# Patient Record
Sex: MALE | Race: WHITE | Employment: FULL TIME | URBAN - METROPOLITAN AREA
[De-identification: names, ages, dates, MRNs, and addresses within clinical notes are randomized per-mention and may not be internally consistent; named-entity substitution may affect disease eponyms.]

---

## 2017-01-03 PROBLEM — I48.91 RAPID ATRIAL FIBRILLATION (HCC): Status: ACTIVE | Noted: 2017-01-03

## 2017-01-03 PROBLEM — Z45.02 AICD DISCHARGE: Status: ACTIVE | Noted: 2017-01-03

## 2017-01-03 PROBLEM — F10.939 ALCOHOL WITHDRAWAL (HCC): Status: ACTIVE | Noted: 2017-01-03

## 2017-01-03 PROBLEM — F14.10 COCAINE ABUSE (HCC): Status: ACTIVE | Noted: 2017-01-03

## 2017-01-04 ENCOUNTER — TELEPHONE (OUTPATIENT)
Dept: FAMILY MEDICINE CLINIC | Age: 52
End: 2017-01-04

## 2017-01-04 PROBLEM — I48.91 RAPID ATRIAL FIBRILLATION (HCC): Status: RESOLVED | Noted: 2017-01-03 | Resolved: 2017-01-04

## 2017-01-05 ENCOUNTER — TELEPHONE (OUTPATIENT)
Dept: FAMILY MEDICINE CLINIC | Age: 52
End: 2017-01-05

## 2017-01-05 ENCOUNTER — PROCEDURE VISIT (OUTPATIENT)
Dept: CARDIOLOGY | Age: 52
End: 2017-01-05

## 2017-01-05 DIAGNOSIS — F41.3 OTHER MIXED ANXIETY DISORDERS: ICD-10-CM

## 2017-01-05 DIAGNOSIS — I50.22 CHRONIC SYSTOLIC CONGESTIVE HEART FAILURE (HCC): Primary | ICD-10-CM

## 2017-01-05 DIAGNOSIS — Z95.810 S/P ICD (INTERNAL CARDIAC DEFIBRILLATOR) PROCEDURE: ICD-10-CM

## 2017-01-05 DIAGNOSIS — E11.9 CONTROLLED TYPE 2 DIABETES MELLITUS WITHOUT COMPLICATION, WITHOUT LONG-TERM CURRENT USE OF INSULIN (HCC): Primary | ICD-10-CM

## 2017-01-05 DIAGNOSIS — E11.9 CONTROLLED TYPE 2 DIABETES MELLITUS WITHOUT COMPLICATION, WITHOUT LONG-TERM CURRENT USE OF INSULIN (HCC): ICD-10-CM

## 2017-01-05 PROCEDURE — 93297 REM INTERROG DEV EVAL ICPMS: CPT | Performed by: NUCLEAR MEDICINE

## 2017-01-05 RX ORDER — ALPRAZOLAM 0.5 MG/1
0.5 TABLET ORAL NIGHTLY PRN
Qty: 30 TABLET | Refills: 0 | Status: SHIPPED | OUTPATIENT
Start: 2017-01-05 | End: 2017-03-31 | Stop reason: SDUPTHER

## 2017-01-06 RX ORDER — VALSARTAN 320 MG/1
TABLET ORAL
Qty: 90 TABLET | Refills: 0 | Status: SHIPPED | OUTPATIENT
Start: 2017-01-06 | End: 2017-04-04 | Stop reason: SDUPTHER

## 2017-01-11 ENCOUNTER — OFFICE VISIT (OUTPATIENT)
Dept: FAMILY MEDICINE CLINIC | Age: 52
End: 2017-01-11

## 2017-01-11 DIAGNOSIS — E11.65 CONTROLLED TYPE 2 DIABETES MELLITUS WITH HYPERGLYCEMIA, WITHOUT LONG-TERM CURRENT USE OF INSULIN (HCC): ICD-10-CM

## 2017-01-11 DIAGNOSIS — Z45.02 AICD DISCHARGE: ICD-10-CM

## 2017-01-11 DIAGNOSIS — I10 ESSENTIAL HYPERTENSION: Chronic | ICD-10-CM

## 2017-01-11 DIAGNOSIS — I42.0 CARDIOMYOPATHY, DILATED (HCC): ICD-10-CM

## 2017-01-11 DIAGNOSIS — F10.10 ALCOHOL ABUSE: ICD-10-CM

## 2017-01-11 DIAGNOSIS — Z79.01 ANTICOAGULATED ON COUMADIN: ICD-10-CM

## 2017-01-11 DIAGNOSIS — F33.2 SEVERE EPISODE OF RECURRENT MAJOR DEPRESSIVE DISORDER, WITHOUT PSYCHOTIC FEATURES (HCC): Chronic | ICD-10-CM

## 2017-01-11 DIAGNOSIS — Z72.0 TOBACCO ABUSE: ICD-10-CM

## 2017-01-11 DIAGNOSIS — I48.0 PAROXYSMAL A-FIB (HCC): Primary | ICD-10-CM

## 2017-01-11 DIAGNOSIS — F19.11 HISTORY OF SUBSTANCE ABUSE (HCC): ICD-10-CM

## 2017-01-11 DIAGNOSIS — E78.2 MIXED HYPERLIPIDEMIA: ICD-10-CM

## 2017-01-11 DIAGNOSIS — I25.10 CORONARY ARTERY DISEASE INVOLVING NATIVE CORONARY ARTERY OF NATIVE HEART WITHOUT ANGINA PECTORIS: ICD-10-CM

## 2017-01-11 PROCEDURE — 99214 OFFICE O/P EST MOD 30 MIN: CPT | Performed by: NURSE PRACTITIONER

## 2017-01-12 ENCOUNTER — ANTI-COAG VISIT (OUTPATIENT)
Dept: OTHER | Age: 52
End: 2017-01-12

## 2017-01-12 VITALS
WEIGHT: 274 LBS | SYSTOLIC BLOOD PRESSURE: 138 MMHG | BODY MASS INDEX: 35.16 KG/M2 | RESPIRATION RATE: 12 BRPM | HEIGHT: 74 IN | DIASTOLIC BLOOD PRESSURE: 80 MMHG | HEART RATE: 56 BPM

## 2017-01-12 VITALS
WEIGHT: 272 LBS | HEART RATE: 60 BPM | SYSTOLIC BLOOD PRESSURE: 130 MMHG | DIASTOLIC BLOOD PRESSURE: 70 MMHG | BODY MASS INDEX: 34.92 KG/M2

## 2017-01-12 DIAGNOSIS — I48.0 PAROXYSMAL ATRIAL FIBRILLATION (HCC): ICD-10-CM

## 2017-01-12 LAB — POC INR: 2.2 (ref 0.8–1.2)

## 2017-01-12 PROCEDURE — 99999 PR OFFICE/OUTPT VISIT,PROCEDURE ONLY: CPT | Performed by: NURSE PRACTITIONER

## 2017-01-12 PROCEDURE — 85610 PROTHROMBIN TIME: CPT | Performed by: NURSE PRACTITIONER

## 2017-01-12 ASSESSMENT — ENCOUNTER SYMPTOMS
COUGH: 0
DIARRHEA: 0
CONSTIPATION: 0
NAUSEA: 0
ABDOMINAL PAIN: 0
SHORTNESS OF BREATH: 0
BLOOD IN STOOL: 0
SHORTNESS OF BREATH: 0

## 2017-01-26 ENCOUNTER — ANTI-COAG VISIT (OUTPATIENT)
Dept: OTHER | Age: 52
End: 2017-01-26

## 2017-01-26 VITALS
HEART RATE: 55 BPM | WEIGHT: 271.2 LBS | BODY MASS INDEX: 34.82 KG/M2 | DIASTOLIC BLOOD PRESSURE: 80 MMHG | SYSTOLIC BLOOD PRESSURE: 138 MMHG

## 2017-01-26 DIAGNOSIS — I48.0 PAROXYSMAL ATRIAL FIBRILLATION (HCC): ICD-10-CM

## 2017-01-26 LAB — POC INR: 2.9 (ref 0.8–1.2)

## 2017-01-26 PROCEDURE — 85610 PROTHROMBIN TIME: CPT | Performed by: NURSE PRACTITIONER

## 2017-01-26 PROCEDURE — 99999 PR OFFICE/OUTPT VISIT,PROCEDURE ONLY: CPT | Performed by: NURSE PRACTITIONER

## 2017-01-26 ASSESSMENT — ENCOUNTER SYMPTOMS
DIARRHEA: 0
CONSTIPATION: 0
SHORTNESS OF BREATH: 0
BLOOD IN STOOL: 0

## 2017-01-30 ENCOUNTER — OFFICE VISIT (OUTPATIENT)
Dept: CARDIOLOGY | Age: 52
End: 2017-01-30

## 2017-01-30 ENCOUNTER — TELEPHONE (OUTPATIENT)
Dept: CARDIOLOGY | Age: 52
End: 2017-01-30

## 2017-01-30 VITALS
DIASTOLIC BLOOD PRESSURE: 74 MMHG | WEIGHT: 270.2 LBS | SYSTOLIC BLOOD PRESSURE: 114 MMHG | BODY MASS INDEX: 34.68 KG/M2 | HEART RATE: 64 BPM | HEIGHT: 74 IN

## 2017-01-30 DIAGNOSIS — Z95.810 ICD (IMPLANTABLE CARDIOVERTER-DEFIBRILLATOR) IN PLACE: ICD-10-CM

## 2017-01-30 DIAGNOSIS — I42.9 CARDIOMYOPATHY (HCC): ICD-10-CM

## 2017-01-30 DIAGNOSIS — I10 ESSENTIAL HYPERTENSION: Primary | ICD-10-CM

## 2017-01-30 DIAGNOSIS — I25.810 CORONARY ARTERY DISEASE INVOLVING CORONARY BYPASS GRAFT OF NATIVE HEART WITHOUT ANGINA PECTORIS: ICD-10-CM

## 2017-01-30 PROCEDURE — 99213 OFFICE O/P EST LOW 20 MIN: CPT | Performed by: NUCLEAR MEDICINE

## 2017-01-30 RX ORDER — SILDENAFIL 50 MG/1
50 TABLET, FILM COATED ORAL PRN
Qty: 10 TABLET | Refills: 0 | Status: SHIPPED | OUTPATIENT
Start: 2017-01-30 | End: 2017-04-11 | Stop reason: ALTCHOICE

## 2017-02-09 ENCOUNTER — PROCEDURE VISIT (OUTPATIENT)
Dept: CARDIOLOGY | Age: 52
End: 2017-02-09

## 2017-02-09 DIAGNOSIS — Z95.810 S/P ICD (INTERNAL CARDIAC DEFIBRILLATOR) PROCEDURE: Primary | ICD-10-CM

## 2017-02-09 PROCEDURE — 93296 REM INTERROG EVL PM/IDS: CPT | Performed by: INTERNAL MEDICINE

## 2017-02-09 PROCEDURE — 93295 DEV INTERROG REMOTE 1/2/MLT: CPT | Performed by: INTERNAL MEDICINE

## 2017-02-16 ENCOUNTER — ANTI-COAG VISIT (OUTPATIENT)
Dept: OTHER | Age: 52
End: 2017-02-16

## 2017-02-16 VITALS
HEART RATE: 63 BPM | WEIGHT: 275.8 LBS | SYSTOLIC BLOOD PRESSURE: 142 MMHG | DIASTOLIC BLOOD PRESSURE: 60 MMHG | BODY MASS INDEX: 35.41 KG/M2

## 2017-02-16 DIAGNOSIS — I48.0 PAROXYSMAL ATRIAL FIBRILLATION (HCC): ICD-10-CM

## 2017-02-16 LAB — POC INR: 2.1 (ref 0.8–1.2)

## 2017-02-16 PROCEDURE — 85610 PROTHROMBIN TIME: CPT | Performed by: NURSE PRACTITIONER

## 2017-02-16 PROCEDURE — 99999 PR OFFICE/OUTPT VISIT,PROCEDURE ONLY: CPT | Performed by: NURSE PRACTITIONER

## 2017-02-16 ASSESSMENT — ENCOUNTER SYMPTOMS
DIARRHEA: 0
SHORTNESS OF BREATH: 0
CONSTIPATION: 0
BLOOD IN STOOL: 0

## 2017-03-06 RX ORDER — SIMVASTATIN 20 MG
TABLET ORAL
Qty: 90 TABLET | Refills: 2 | Status: SHIPPED | OUTPATIENT
Start: 2017-03-06 | End: 2017-04-26

## 2017-03-14 DIAGNOSIS — I48.0 PAROXYSMAL ATRIAL FIBRILLATION (HCC): Primary | ICD-10-CM

## 2017-03-16 ENCOUNTER — PROCEDURE VISIT (OUTPATIENT)
Dept: CARDIOLOGY | Age: 52
End: 2017-03-16

## 2017-03-16 ENCOUNTER — TELEPHONE (OUTPATIENT)
Dept: CARDIOLOGY | Age: 52
End: 2017-03-16

## 2017-03-16 DIAGNOSIS — Z95.810 S/P ICD (INTERNAL CARDIAC DEFIBRILLATOR) PROCEDURE: ICD-10-CM

## 2017-03-16 DIAGNOSIS — I50.22 CHRONIC SYSTOLIC CONGESTIVE HEART FAILURE (HCC): Primary | ICD-10-CM

## 2017-03-16 PROCEDURE — 93297 REM INTERROG DEV EVAL ICPMS: CPT | Performed by: NUCLEAR MEDICINE

## 2017-03-29 ENCOUNTER — ANTI-COAG VISIT (OUTPATIENT)
Dept: OTHER | Age: 52
End: 2017-03-29

## 2017-03-29 VITALS
HEART RATE: 56 BPM | SYSTOLIC BLOOD PRESSURE: 142 MMHG | WEIGHT: 276.6 LBS | BODY MASS INDEX: 35.51 KG/M2 | DIASTOLIC BLOOD PRESSURE: 80 MMHG

## 2017-03-29 DIAGNOSIS — I48.0 PAROXYSMAL ATRIAL FIBRILLATION (HCC): ICD-10-CM

## 2017-03-29 LAB — POC INR: 2.9 (ref 0.8–1.2)

## 2017-03-29 RX ORDER — FINASTERIDE 5 MG/1
TABLET, FILM COATED ORAL
Refills: 3 | COMMUNITY
Start: 2017-02-14 | End: 2018-01-04 | Stop reason: SDUPTHER

## 2017-03-29 ASSESSMENT — ENCOUNTER SYMPTOMS
DIARRHEA: 0
SHORTNESS OF BREATH: 0
CONSTIPATION: 0
BLOOD IN STOOL: 0

## 2017-03-30 RX ORDER — METOPROLOL TARTRATE 50 MG/1
TABLET, FILM COATED ORAL
Qty: 270 TABLET | Refills: 0 | OUTPATIENT
Start: 2017-03-30

## 2017-03-30 RX ORDER — ASPIRIN 325 MG
TABLET, DELAYED RELEASE (ENTERIC COATED) ORAL
Qty: 90 TABLET | Refills: 0 | OUTPATIENT
Start: 2017-03-30

## 2017-03-30 RX ORDER — FUROSEMIDE 40 MG/1
TABLET ORAL
Qty: 90 TABLET | Refills: 0 | OUTPATIENT
Start: 2017-03-30

## 2017-03-31 DIAGNOSIS — F41.3 OTHER MIXED ANXIETY DISORDERS: ICD-10-CM

## 2017-03-31 RX ORDER — FUROSEMIDE 40 MG/1
40 TABLET ORAL DAILY
Qty: 90 TABLET | Refills: 1 | Status: ON HOLD | OUTPATIENT
Start: 2017-03-31 | End: 2017-06-30

## 2017-03-31 RX ORDER — ALPRAZOLAM 0.5 MG/1
0.5 TABLET ORAL NIGHTLY PRN
Qty: 30 TABLET | Refills: 0 | Status: SHIPPED | OUTPATIENT
Start: 2017-03-31 | End: 2017-04-11 | Stop reason: SDUPTHER

## 2017-03-31 RX ORDER — METOPROLOL TARTRATE 50 MG/1
50 TABLET, FILM COATED ORAL 3 TIMES DAILY
Qty: 270 TABLET | Refills: 1 | Status: SHIPPED | OUTPATIENT
Start: 2017-03-31 | End: 2017-09-27 | Stop reason: SDUPTHER

## 2017-04-04 ENCOUNTER — TELEPHONE (OUTPATIENT)
Dept: FAMILY MEDICINE CLINIC | Age: 52
End: 2017-04-04

## 2017-04-04 RX ORDER — VALSARTAN 320 MG/1
TABLET ORAL
Qty: 90 TABLET | Refills: 0 | Status: ON HOLD | OUTPATIENT
Start: 2017-04-04 | End: 2017-07-03 | Stop reason: SDUPTHER

## 2017-04-11 ENCOUNTER — OFFICE VISIT (OUTPATIENT)
Dept: FAMILY MEDICINE CLINIC | Age: 52
End: 2017-04-11

## 2017-04-11 VITALS
DIASTOLIC BLOOD PRESSURE: 76 MMHG | WEIGHT: 272.7 LBS | HEART RATE: 68 BPM | RESPIRATION RATE: 16 BRPM | HEIGHT: 74 IN | BODY MASS INDEX: 35 KG/M2 | SYSTOLIC BLOOD PRESSURE: 124 MMHG

## 2017-04-11 DIAGNOSIS — I50.22 CHRONIC SYSTOLIC CONGESTIVE HEART FAILURE (HCC): ICD-10-CM

## 2017-04-11 DIAGNOSIS — Z95.810 S/P ICD (INTERNAL CARDIAC DEFIBRILLATOR) PROCEDURE: ICD-10-CM

## 2017-04-11 DIAGNOSIS — I48.0 PAROXYSMAL ATRIAL FIBRILLATION (HCC): ICD-10-CM

## 2017-04-11 DIAGNOSIS — Z79.01 ANTICOAGULATED ON COUMADIN: ICD-10-CM

## 2017-04-11 DIAGNOSIS — F10.10 ALCOHOL ABUSE: ICD-10-CM

## 2017-04-11 DIAGNOSIS — E11.65 CONTROLLED TYPE 2 DIABETES MELLITUS WITH HYPERGLYCEMIA, WITHOUT LONG-TERM CURRENT USE OF INSULIN (HCC): Primary | ICD-10-CM

## 2017-04-11 DIAGNOSIS — I25.810 CORONARY ARTERY DISEASE INVOLVING CORONARY BYPASS GRAFT OF NATIVE HEART WITHOUT ANGINA PECTORIS: ICD-10-CM

## 2017-04-11 DIAGNOSIS — Z72.0 TOBACCO ABUSE: ICD-10-CM

## 2017-04-11 DIAGNOSIS — I10 ESSENTIAL HYPERTENSION: Chronic | ICD-10-CM

## 2017-04-11 DIAGNOSIS — F41.3 OTHER MIXED ANXIETY DISORDERS: ICD-10-CM

## 2017-04-11 DIAGNOSIS — Z12.12 SCREENING FOR COLORECTAL CANCER: ICD-10-CM

## 2017-04-11 DIAGNOSIS — Z12.11 SCREENING FOR COLORECTAL CANCER: ICD-10-CM

## 2017-04-11 PROCEDURE — 99214 OFFICE O/P EST MOD 30 MIN: CPT | Performed by: NURSE PRACTITIONER

## 2017-04-11 RX ORDER — ALPRAZOLAM 0.5 MG/1
0.5 TABLET ORAL NIGHTLY PRN
Qty: 30 TABLET | Refills: 5 | Status: SHIPPED | OUTPATIENT
Start: 2017-04-11 | End: 2017-11-05 | Stop reason: SDUPTHER

## 2017-04-12 ENCOUNTER — TELEPHONE (OUTPATIENT)
Dept: FAMILY MEDICINE CLINIC | Age: 52
End: 2017-04-12

## 2017-04-13 ASSESSMENT — ENCOUNTER SYMPTOMS
COUGH: 0
NAUSEA: 0
SHORTNESS OF BREATH: 0
ABDOMINAL PAIN: 0

## 2017-04-20 ENCOUNTER — TELEPHONE (OUTPATIENT)
Dept: CARDIOLOGY | Age: 52
End: 2017-04-20

## 2017-04-20 ENCOUNTER — PROCEDURE VISIT (OUTPATIENT)
Dept: CARDIOLOGY | Age: 52
End: 2017-04-20

## 2017-04-20 DIAGNOSIS — I50.22 CHRONIC SYSTOLIC CONGESTIVE HEART FAILURE (HCC): Primary | ICD-10-CM

## 2017-04-20 DIAGNOSIS — Z95.810 S/P ICD (INTERNAL CARDIAC DEFIBRILLATOR) PROCEDURE: ICD-10-CM

## 2017-04-20 PROCEDURE — 93297 REM INTERROG DEV EVAL ICPMS: CPT | Performed by: NUCLEAR MEDICINE

## 2017-04-26 ENCOUNTER — ANTI-COAG VISIT (OUTPATIENT)
Dept: OTHER | Age: 52
End: 2017-04-26

## 2017-04-26 VITALS
WEIGHT: 271.6 LBS | DIASTOLIC BLOOD PRESSURE: 84 MMHG | SYSTOLIC BLOOD PRESSURE: 138 MMHG | BODY MASS INDEX: 34.87 KG/M2 | HEART RATE: 50 BPM

## 2017-04-26 DIAGNOSIS — I48.0 PAROXYSMAL ATRIAL FIBRILLATION (HCC): ICD-10-CM

## 2017-04-26 LAB — POC INR: 2.4 (ref 0.8–1.2)

## 2017-04-26 ASSESSMENT — ENCOUNTER SYMPTOMS
SHORTNESS OF BREATH: 0
BLOOD IN STOOL: 0
DIARRHEA: 0
CONSTIPATION: 0

## 2017-05-25 ENCOUNTER — TELEPHONE (OUTPATIENT)
Dept: FAMILY MEDICINE CLINIC | Age: 52
End: 2017-05-25

## 2017-05-31 ENCOUNTER — ANTI-COAG VISIT (OUTPATIENT)
Dept: OTHER | Age: 52
End: 2017-05-31

## 2017-05-31 VITALS
DIASTOLIC BLOOD PRESSURE: 84 MMHG | BODY MASS INDEX: 35.13 KG/M2 | HEART RATE: 76 BPM | WEIGHT: 273.6 LBS | SYSTOLIC BLOOD PRESSURE: 160 MMHG

## 2017-05-31 DIAGNOSIS — I48.0 PAROXYSMAL ATRIAL FIBRILLATION (HCC): ICD-10-CM

## 2017-05-31 LAB — POC INR: 3.4 (ref 0.8–1.2)

## 2017-05-31 ASSESSMENT — ENCOUNTER SYMPTOMS
SHORTNESS OF BREATH: 0
DIARRHEA: 0
CONSTIPATION: 0
BLOOD IN STOOL: 0

## 2017-06-01 ENCOUNTER — PROCEDURE VISIT (OUTPATIENT)
Dept: CARDIOLOGY | Age: 52
End: 2017-06-01

## 2017-06-01 DIAGNOSIS — Z95.810 S/P ICD (INTERNAL CARDIAC DEFIBRILLATOR) PROCEDURE: Primary | ICD-10-CM

## 2017-06-01 PROCEDURE — 93295 DEV INTERROG REMOTE 1/2/MLT: CPT | Performed by: NUCLEAR MEDICINE

## 2017-06-01 PROCEDURE — 93296 REM INTERROG EVL PM/IDS: CPT | Performed by: NUCLEAR MEDICINE

## 2017-06-29 ENCOUNTER — PROCEDURE VISIT (OUTPATIENT)
Dept: CARDIOLOGY | Age: 52
End: 2017-06-29

## 2017-06-29 DIAGNOSIS — I50.22 CHRONIC SYSTOLIC CONGESTIVE HEART FAILURE (HCC): Primary | ICD-10-CM

## 2017-06-29 PROCEDURE — 93297 REM INTERROG DEV EVAL ICPMS: CPT | Performed by: NUCLEAR MEDICINE

## 2017-06-30 PROBLEM — T24.202A SECOND DEGREE BURN OF LEFT LEG: Status: ACTIVE | Noted: 2017-06-30

## 2017-06-30 PROBLEM — T24.009A LEG BURN: Status: ACTIVE | Noted: 2017-06-30

## 2017-06-30 PROBLEM — H93.A9 TINNITUS OF VASCULAR ORIGIN: Status: ACTIVE | Noted: 2017-06-30

## 2017-06-30 RX ORDER — POTASSIUM CHLORIDE 20 MEQ/1
TABLET, EXTENDED RELEASE ORAL
Qty: 180 TABLET | Refills: 1 | Status: SHIPPED | OUTPATIENT
Start: 2017-06-30 | End: 2018-01-04 | Stop reason: SDUPTHER

## 2017-06-30 RX ORDER — MEXILETINE HYDROCHLORIDE 150 MG/1
CAPSULE ORAL
Qty: 540 CAPSULE | Refills: 0 | Status: SHIPPED | OUTPATIENT
Start: 2017-06-30 | End: 2017-07-03 | Stop reason: HOSPADM

## 2017-07-03 ENCOUNTER — TELEPHONE (OUTPATIENT)
Dept: CARDIOLOGY | Age: 52
End: 2017-07-03

## 2017-07-03 ENCOUNTER — TELEPHONE (OUTPATIENT)
Dept: FAMILY MEDICINE CLINIC | Age: 52
End: 2017-07-03

## 2017-07-03 RX ORDER — VALSARTAN 320 MG/1
TABLET ORAL
Qty: 90 TABLET | Refills: 0 | Status: SHIPPED | OUTPATIENT
Start: 2017-07-03 | End: 2017-08-23 | Stop reason: ALTCHOICE

## 2017-07-05 ENCOUNTER — TELEPHONE (OUTPATIENT)
Dept: FAMILY MEDICINE CLINIC | Age: 52
End: 2017-07-05

## 2017-07-06 ENCOUNTER — ANTI-COAG VISIT (OUTPATIENT)
Dept: OTHER | Age: 52
End: 2017-07-06

## 2017-07-06 VITALS
HEART RATE: 56 BPM | BODY MASS INDEX: 35.91 KG/M2 | DIASTOLIC BLOOD PRESSURE: 88 MMHG | SYSTOLIC BLOOD PRESSURE: 160 MMHG | WEIGHT: 264.8 LBS

## 2017-07-06 DIAGNOSIS — I48.0 PAROXYSMAL ATRIAL FIBRILLATION (HCC): ICD-10-CM

## 2017-07-06 LAB — POC INR: 2.5 (ref 0.8–1.2)

## 2017-07-06 ASSESSMENT — ENCOUNTER SYMPTOMS
DIARRHEA: 0
SHORTNESS OF BREATH: 0
BLOOD IN STOOL: 0
CONSTIPATION: 0

## 2017-07-07 ENCOUNTER — OFFICE VISIT (OUTPATIENT)
Dept: FAMILY MEDICINE CLINIC | Age: 52
End: 2017-07-07

## 2017-07-07 VITALS
SYSTOLIC BLOOD PRESSURE: 132 MMHG | HEART RATE: 76 BPM | RESPIRATION RATE: 16 BRPM | HEIGHT: 74 IN | WEIGHT: 268.7 LBS | BODY MASS INDEX: 34.48 KG/M2 | DIASTOLIC BLOOD PRESSURE: 78 MMHG

## 2017-07-07 DIAGNOSIS — Z95.810 S/P ICD (INTERNAL CARDIAC DEFIBRILLATOR) PROCEDURE: ICD-10-CM

## 2017-07-07 DIAGNOSIS — I47.29 NSVT (NONSUSTAINED VENTRICULAR TACHYCARDIA): Primary | ICD-10-CM

## 2017-07-07 DIAGNOSIS — E11.65 CONTROLLED TYPE 2 DIABETES MELLITUS WITH HYPERGLYCEMIA, WITHOUT LONG-TERM CURRENT USE OF INSULIN (HCC): ICD-10-CM

## 2017-07-07 DIAGNOSIS — I25.5 ISCHEMIC CARDIOMYOPATHY: ICD-10-CM

## 2017-07-07 DIAGNOSIS — Z79.01 ANTICOAGULATED ON COUMADIN: ICD-10-CM

## 2017-07-07 DIAGNOSIS — I25.810 CORONARY ARTERY DISEASE INVOLVING CORONARY BYPASS GRAFT OF NATIVE HEART WITHOUT ANGINA PECTORIS: ICD-10-CM

## 2017-07-07 DIAGNOSIS — T24.001A: ICD-10-CM

## 2017-07-07 DIAGNOSIS — I50.20 SYSTOLIC CONGESTIVE HEART FAILURE, NYHA CLASS 2 (HCC): ICD-10-CM

## 2017-07-07 PROCEDURE — 99214 OFFICE O/P EST MOD 30 MIN: CPT | Performed by: NURSE PRACTITIONER

## 2017-07-07 ASSESSMENT — PATIENT HEALTH QUESTIONNAIRE - PHQ9
2. FEELING DOWN, DEPRESSED OR HOPELESS: 0
1. LITTLE INTEREST OR PLEASURE IN DOING THINGS: 0
SUM OF ALL RESPONSES TO PHQ QUESTIONS 1-9: 0
SUM OF ALL RESPONSES TO PHQ9 QUESTIONS 1 & 2: 0

## 2017-07-10 ASSESSMENT — ENCOUNTER SYMPTOMS
NAUSEA: 0
ABDOMINAL PAIN: 0
COUGH: 0
SHORTNESS OF BREATH: 0

## 2017-07-13 ENCOUNTER — ANTI-COAG VISIT (OUTPATIENT)
Dept: OTHER | Age: 52
End: 2017-07-13

## 2017-07-13 VITALS
WEIGHT: 266.8 LBS | HEART RATE: 60 BPM | DIASTOLIC BLOOD PRESSURE: 92 MMHG | SYSTOLIC BLOOD PRESSURE: 170 MMHG | BODY MASS INDEX: 34.26 KG/M2

## 2017-07-13 DIAGNOSIS — I48.0 PAROXYSMAL ATRIAL FIBRILLATION (HCC): ICD-10-CM

## 2017-07-13 LAB — POC INR: 5 (ref 0.8–1.2)

## 2017-07-13 ASSESSMENT — ENCOUNTER SYMPTOMS
BLOOD IN STOOL: 0
SHORTNESS OF BREATH: 0
DIARRHEA: 0
CONSTIPATION: 0

## 2017-07-31 ENCOUNTER — HOSPITAL ENCOUNTER (OUTPATIENT)
Dept: PHARMACY | Age: 52
Setting detail: THERAPIES SERIES
Discharge: HOME | End: 2017-07-31
Payer: COMMERCIAL

## 2017-07-31 VITALS
DIASTOLIC BLOOD PRESSURE: 96 MMHG | HEART RATE: 56 BPM | WEIGHT: 267.2 LBS | BODY MASS INDEX: 34.31 KG/M2 | SYSTOLIC BLOOD PRESSURE: 164 MMHG

## 2017-07-31 DIAGNOSIS — I48.0 PAROXYSMAL ATRIAL FIBRILLATION (HCC): ICD-10-CM

## 2017-07-31 LAB
HCT VFR BLD CALC: 43.4 % (ref 42–52)
HEMOGLOBIN: 14.8 GM/DL (ref 14–18)
INR BLD: 4.22 (ref 0.85–1.13)
INTERNATIONAL NORMALIZATION RATIO, POC: 5.1

## 2017-07-31 PROCEDURE — 36416 COLLJ CAPILLARY BLOOD SPEC: CPT

## 2017-07-31 PROCEDURE — 85610 PROTHROMBIN TIME: CPT

## 2017-07-31 PROCEDURE — 99211 OFF/OP EST MAY X REQ PHY/QHP: CPT

## 2017-08-02 ENCOUNTER — OFFICE VISIT (OUTPATIENT)
Dept: CARDIOLOGY CLINIC | Age: 52
End: 2017-08-02
Payer: COMMERCIAL

## 2017-08-02 VITALS
WEIGHT: 270 LBS | SYSTOLIC BLOOD PRESSURE: 162 MMHG | HEIGHT: 74 IN | BODY MASS INDEX: 34.65 KG/M2 | DIASTOLIC BLOOD PRESSURE: 90 MMHG | HEART RATE: 56 BPM

## 2017-08-02 DIAGNOSIS — I25.810 CORONARY ARTERY DISEASE INVOLVING CORONARY BYPASS GRAFT OF NATIVE HEART WITHOUT ANGINA PECTORIS: Primary | ICD-10-CM

## 2017-08-02 DIAGNOSIS — I49.3 PVC (PREMATURE VENTRICULAR CONTRACTION): ICD-10-CM

## 2017-08-02 PROCEDURE — 93000 ELECTROCARDIOGRAM COMPLETE: CPT | Performed by: INTERNAL MEDICINE

## 2017-08-02 PROCEDURE — 99204 OFFICE O/P NEW MOD 45 MIN: CPT | Performed by: INTERNAL MEDICINE

## 2017-08-02 RX ORDER — AMIODARONE HYDROCHLORIDE 200 MG/1
200 TABLET ORAL 2 TIMES DAILY
COMMUNITY
End: 2017-08-02 | Stop reason: SDUPTHER

## 2017-08-02 RX ORDER — AMIODARONE HYDROCHLORIDE 200 MG/1
200 TABLET ORAL 2 TIMES DAILY
Qty: 60 TABLET | Refills: 1 | Status: SHIPPED | OUTPATIENT
Start: 2017-08-02 | End: 2017-10-03 | Stop reason: SDUPTHER

## 2017-08-02 ASSESSMENT — ENCOUNTER SYMPTOMS
RIGHT EYE: 0
DOUBLE VISION: 0
ABDOMINAL PAIN: 0
NAUSEA: 0
CONSTIPATION: 0
WHEEZING: 0
LEFT EYE: 0
SHORTNESS OF BREATH: 0
SORE THROAT: 0
BACK PAIN: 0
DIARRHEA: 0
COUGH: 0
VOMITING: 0
BLURRED VISION: 0

## 2017-08-07 ENCOUNTER — HOSPITAL ENCOUNTER (OUTPATIENT)
Dept: PHARMACY | Age: 52
Setting detail: THERAPIES SERIES
Discharge: HOME | End: 2017-08-07
Payer: COMMERCIAL

## 2017-08-07 VITALS
HEART RATE: 56 BPM | DIASTOLIC BLOOD PRESSURE: 90 MMHG | SYSTOLIC BLOOD PRESSURE: 170 MMHG | BODY MASS INDEX: 34.41 KG/M2 | WEIGHT: 268 LBS

## 2017-08-07 DIAGNOSIS — I48.0 PAROXYSMAL ATRIAL FIBRILLATION (HCC): ICD-10-CM

## 2017-08-07 LAB — POC INR: 3.1 (ref 0.8–1.2)

## 2017-08-07 PROCEDURE — 99211 OFF/OP EST MAY X REQ PHY/QHP: CPT

## 2017-08-07 PROCEDURE — 85610 PROTHROMBIN TIME: CPT

## 2017-08-07 PROCEDURE — 36416 COLLJ CAPILLARY BLOOD SPEC: CPT

## 2017-08-07 RX ORDER — HYDROCODONE BITARTRATE AND ACETAMINOPHEN 7.5; 325 MG/1; MG/1
1 TABLET ORAL EVERY 6 HOURS PRN
Status: ON HOLD | COMMUNITY
End: 2017-12-18 | Stop reason: ALTCHOICE

## 2017-08-16 ENCOUNTER — HOSPITAL ENCOUNTER (OUTPATIENT)
Dept: PHARMACY | Age: 52
Setting detail: THERAPIES SERIES
Discharge: HOME | End: 2017-08-16
Payer: COMMERCIAL

## 2017-08-16 VITALS
WEIGHT: 268.4 LBS | BODY MASS INDEX: 34.46 KG/M2 | DIASTOLIC BLOOD PRESSURE: 88 MMHG | HEART RATE: 50 BPM | SYSTOLIC BLOOD PRESSURE: 142 MMHG

## 2017-08-16 DIAGNOSIS — I48.0 PAROXYSMAL ATRIAL FIBRILLATION (HCC): ICD-10-CM

## 2017-08-16 LAB — POC INR: 4.4 (ref 0.8–1.2)

## 2017-08-16 PROCEDURE — 36416 COLLJ CAPILLARY BLOOD SPEC: CPT

## 2017-08-16 PROCEDURE — 85610 PROTHROMBIN TIME: CPT

## 2017-08-16 PROCEDURE — 99211 OFF/OP EST MAY X REQ PHY/QHP: CPT

## 2017-08-16 ASSESSMENT — ENCOUNTER SYMPTOMS
CONSTIPATION: 0
DIARRHEA: 0
SHORTNESS OF BREATH: 0
BLOOD IN STOOL: 0

## 2017-08-23 ENCOUNTER — OFFICE VISIT (OUTPATIENT)
Dept: CARDIOLOGY CLINIC | Age: 52
End: 2017-08-23
Payer: COMMERCIAL

## 2017-08-23 VITALS
WEIGHT: 270 LBS | DIASTOLIC BLOOD PRESSURE: 78 MMHG | HEART RATE: 50 BPM | SYSTOLIC BLOOD PRESSURE: 188 MMHG | BODY MASS INDEX: 34.65 KG/M2 | HEIGHT: 74 IN

## 2017-08-23 DIAGNOSIS — I25.10 CORONARY ARTERY DISEASE INVOLVING NATIVE CORONARY ARTERY OF NATIVE HEART WITHOUT ANGINA PECTORIS: ICD-10-CM

## 2017-08-23 DIAGNOSIS — I48.0 PAROXYSMAL ATRIAL FIBRILLATION (HCC): ICD-10-CM

## 2017-08-23 DIAGNOSIS — I47.20 V TACH: ICD-10-CM

## 2017-08-23 DIAGNOSIS — I10 ESSENTIAL HYPERTENSION: Primary | ICD-10-CM

## 2017-08-23 DIAGNOSIS — Z95.810 ICD (IMPLANTABLE CARDIOVERTER-DEFIBRILLATOR) IN PLACE: ICD-10-CM

## 2017-08-23 DIAGNOSIS — I42.0 DILATED CARDIOMYOPATHY (HCC): ICD-10-CM

## 2017-08-23 PROCEDURE — 99214 OFFICE O/P EST MOD 30 MIN: CPT | Performed by: NUCLEAR MEDICINE

## 2017-08-24 ENCOUNTER — HOSPITAL ENCOUNTER (OUTPATIENT)
Dept: PHARMACY | Age: 52
Setting detail: THERAPIES SERIES
Discharge: HOME | End: 2017-08-24
Payer: COMMERCIAL

## 2017-08-24 ENCOUNTER — TELEPHONE (OUTPATIENT)
Dept: CARDIOLOGY CLINIC | Age: 52
End: 2017-08-24

## 2017-08-24 VITALS
SYSTOLIC BLOOD PRESSURE: 148 MMHG | WEIGHT: 271 LBS | DIASTOLIC BLOOD PRESSURE: 92 MMHG | BODY MASS INDEX: 34.79 KG/M2 | HEART RATE: 56 BPM

## 2017-08-24 DIAGNOSIS — I48.0 PAROXYSMAL ATRIAL FIBRILLATION (HCC): ICD-10-CM

## 2017-08-24 LAB
INR BLD: 2.1
POC INR: 2.1 (ref 0.8–1.2)

## 2017-08-24 PROCEDURE — 85610 PROTHROMBIN TIME: CPT | Performed by: PHARMACIST

## 2017-08-24 PROCEDURE — 36416 COLLJ CAPILLARY BLOOD SPEC: CPT | Performed by: PHARMACIST

## 2017-08-24 PROCEDURE — 99211 OFF/OP EST MAY X REQ PHY/QHP: CPT | Performed by: PHARMACIST

## 2017-09-07 ENCOUNTER — PROCEDURE VISIT (OUTPATIENT)
Dept: CARDIOLOGY CLINIC | Age: 52
End: 2017-09-07
Payer: COMMERCIAL

## 2017-09-07 ENCOUNTER — HOSPITAL ENCOUNTER (OUTPATIENT)
Dept: PHARMACY | Age: 52
Setting detail: THERAPIES SERIES
Discharge: HOME | End: 2017-09-07
Payer: COMMERCIAL

## 2017-09-07 VITALS
SYSTOLIC BLOOD PRESSURE: 138 MMHG | HEART RATE: 60 BPM | WEIGHT: 272.6 LBS | DIASTOLIC BLOOD PRESSURE: 78 MMHG | BODY MASS INDEX: 35 KG/M2

## 2017-09-07 DIAGNOSIS — Z95.810 S/P ICD (INTERNAL CARDIAC DEFIBRILLATOR) PROCEDURE: Primary | ICD-10-CM

## 2017-09-07 DIAGNOSIS — I48.0 PAROXYSMAL ATRIAL FIBRILLATION (HCC): ICD-10-CM

## 2017-09-07 PROBLEM — S83.241A OTHER TEAR OF MEDIAL MENISCUS, CURRENT INJURY, RIGHT KNEE, INITIAL ENCOUNTER: Status: ACTIVE | Noted: 2017-09-07

## 2017-09-07 PROBLEM — M17.9 OSTEOARTHRITIS OF KNEE: Status: ACTIVE | Noted: 2017-09-07

## 2017-09-07 PROBLEM — S83.91XA SPRAIN OF RIGHT KNEE: Status: ACTIVE | Noted: 2017-09-07

## 2017-09-07 PROBLEM — M23.40 BODIES, LOOSE, JOINT, KNEE: Status: ACTIVE | Noted: 2017-09-07

## 2017-09-07 PROBLEM — T24.232D: Status: ACTIVE | Noted: 2017-09-07

## 2017-09-07 LAB — POC INR: 2.2 (ref 0.8–1.2)

## 2017-09-07 PROCEDURE — 36416 COLLJ CAPILLARY BLOOD SPEC: CPT

## 2017-09-07 PROCEDURE — 99211 OFF/OP EST MAY X REQ PHY/QHP: CPT

## 2017-09-07 PROCEDURE — 93296 REM INTERROG EVL PM/IDS: CPT | Performed by: INTERNAL MEDICINE

## 2017-09-07 PROCEDURE — 85610 PROTHROMBIN TIME: CPT

## 2017-09-07 PROCEDURE — 93295 DEV INTERROG REMOTE 1/2/MLT: CPT | Performed by: INTERNAL MEDICINE

## 2017-09-07 ASSESSMENT — ENCOUNTER SYMPTOMS
BLOOD IN STOOL: 0
CONSTIPATION: 0
SHORTNESS OF BREATH: 0
DIARRHEA: 0

## 2017-09-27 RX ORDER — METOPROLOL TARTRATE 50 MG/1
TABLET, FILM COATED ORAL
Qty: 270 TABLET | Refills: 3 | Status: SHIPPED | OUTPATIENT
Start: 2017-09-27 | End: 2018-10-29 | Stop reason: SDUPTHER

## 2017-09-27 RX ORDER — ASPIRIN 325 MG
TABLET, DELAYED RELEASE (ENTERIC COATED) ORAL
Qty: 90 TABLET | Refills: 3 | Status: ON HOLD | OUTPATIENT
Start: 2017-09-27 | End: 2017-12-29 | Stop reason: HOSPADM

## 2017-09-27 RX ORDER — FUROSEMIDE 40 MG/1
TABLET ORAL
Qty: 90 TABLET | Refills: 3 | Status: ON HOLD | OUTPATIENT
Start: 2017-09-27 | End: 2018-05-19

## 2017-09-28 RX ORDER — MEXILETINE HYDROCHLORIDE 150 MG/1
CAPSULE ORAL
Qty: 540 CAPSULE | Refills: 3 | Status: SHIPPED | OUTPATIENT
Start: 2017-09-28 | End: 2018-10-29 | Stop reason: SDUPTHER

## 2017-10-04 ENCOUNTER — HOSPITAL ENCOUNTER (OUTPATIENT)
Dept: PHARMACY | Age: 52
Setting detail: THERAPIES SERIES
Discharge: HOME | End: 2017-10-04
Payer: COMMERCIAL

## 2017-10-04 VITALS
DIASTOLIC BLOOD PRESSURE: 88 MMHG | HEART RATE: 56 BPM | BODY MASS INDEX: 35.44 KG/M2 | SYSTOLIC BLOOD PRESSURE: 170 MMHG | WEIGHT: 276 LBS

## 2017-10-04 DIAGNOSIS — I48.0 PAROXYSMAL ATRIAL FIBRILLATION (HCC): ICD-10-CM

## 2017-10-04 LAB — POC INR: 2.4 (ref 0.8–1.2)

## 2017-10-04 PROCEDURE — 36416 COLLJ CAPILLARY BLOOD SPEC: CPT

## 2017-10-04 PROCEDURE — 99211 OFF/OP EST MAY X REQ PHY/QHP: CPT

## 2017-10-04 PROCEDURE — 85610 PROTHROMBIN TIME: CPT

## 2017-10-04 RX ORDER — AMIODARONE HYDROCHLORIDE 200 MG/1
TABLET ORAL
Qty: 60 TABLET | Refills: 5 | Status: SHIPPED | OUTPATIENT
Start: 2017-10-04 | End: 2018-02-05 | Stop reason: SDUPTHER

## 2017-10-04 ASSESSMENT — ENCOUNTER SYMPTOMS
SHORTNESS OF BREATH: 0
BLOOD IN STOOL: 0
DIARRHEA: 0
CONSTIPATION: 0

## 2017-10-04 NOTE — PROGRESS NOTES
bleeding or with any medication changes. Patient given instructions utilizing the teach back method. Discharged ambulatory in no apparent distress.

## 2017-10-04 NOTE — IP AVS SNAPSHOT
After Visit Summary             Caro Ludwig   10/4/2017  3:45 PM   Anti-Coag    Description:  Male : 1965   Provider:  Mikhail Quigley RN   Department:  [facility]              Your Follow-Up and Future Appointments         Below is a list of your follow-up and future appointments. This may not be a complete list as you may have made appointments directly with providers that we are not aware of or your providers may have made some for you. Please call your providers to confirm appointments. It is important to keep your appointments. Please bring your current insurance card, photo ID, co-pay, and all medication bottles to your appointment. If self-pay, payment is expected at the time of service. Your To-Do List     Future Appointments Provider Department Dept Phone    10/9/2017 3:30 PM Anju Gilbert PA-C [facility] [phone]    Please arrive 15 minutes prior to appointment time, bring insurance card and photo ID. Please arrive 15 minutes prior to appointment time, bring insurance card and photo ID.    10/11/2017 3:30 PM Madisyn Nichols NP Daniel Hayes [address]35 [phone]    Please arrive 15 minutes prior to appointment, bring photo ID and insurance card. Please arrive 15 minutes prior to appointment, bring photo ID and insurance card. 2017 3:30 PM Mikhail Quigley [address] [place] [address]'s Medication Management [phone]    2017 3:30 PM SCHEDULE, [address] [facility]  [facility]    3/7/2018 3:15 PM David Paris MD [facility] [phone]    Please arrive 15 minutes prior to appointment, bring photo ID and insurance card. Please arrive 15 minutes prior to appointment, bring photo ID and insurance card. Information from Your Visit        Department     Name Address Phone Fax    [facility] [address] [address] furosemide (LASIX) 40 MG tablet TAKE 1 TABLET DAILY FOR TREATMENT WITH DIURETIC THERAPY    metoprolol tartrate (LOPRESSOR) 50 MG tablet TAKE 1 TABLET THREE TIMES A DAY FOR ATRIAL FIBRILLATION    sacubitril-valsartan (ENTRESTO) 49-51 MG per tablet Take 1 tablet by mouth 2 times daily    HYDROcodone-acetaminophen (NORCO) 7.5-325 MG per tablet Take 1 tablet by mouth every 6 hours as needed for Pain .    metFORMIN (GLUCOPHAGE) 1000 MG tablet TAKE 1 TABLET TWICE A DAY WITH MEALS FOR DIABETES    linagliptin (TRADJENTA) 5 MG tablet Take 1 tablet by mouth daily    potassium chloride (KLOR-CON M) 20 MEQ extended release tablet TAKE 1 TABLET TWICE A DAY    ALPRAZolam (XANAX) 0.5 MG tablet Take 1 tablet by mouth nightly as needed for Anxiety Fill on or after 4/30/17    finasteride (PROSCAR) 5 MG tablet TK 1 T PO QD    glucose blood VI test strips (PHIL CONTOUR TEST) strip 1 each by In Vitro route daily    tamsulosin (FLOMAX) 0.4 MG capsule Take 0.4 mg by mouth daily Indications: Frequent Urination     warfarin (COUMADIN) 5 MG tablet Take as directed by [facility]. #45 tablets for 30 days.     simvastatin (ZOCOR) 20 MG tablet Take 1 tablet by mouth nightly    acetaminophen 650 MG TABS Take 650 mg by mouth every 4 hours as needed      Allergies              Pcn [Penicillins] Hives    Zoloft [Sertraline Hcl] Anxiety    Worsened anxiety      We Ordered/Performed the following           POCT INR          Result Summary for POCT INR      Result Information       Status          Abnormal Final result (Collected: 10/4/2017  3:41 PM)           10/4/2017  3:43 PM      Component Results     Component Value Ref Range & Units Status    POC INR 2.40 (H) 0.80 - 1.20 Final    ---------INDICATION-----------------------INR Reference Range  DVT, PE, AF, AMI, tissue heart valve          2.0 to 3.0  Mechanical prosthetic valves                  2.5 to 3.5  Performed at [facility] AUDELIA JUÁREZ.INOCENCIO, [address] Yearly Flu Vaccine (1) 9/1/2017    Hemoglobin A1C (Test For Long-Term Glucose Control) 7/1/2018    Diabetic Foot Exam 7/7/2018    Tetanus Combination Vaccine (2 - Td) 6/25/2027            MyChart Signup           Our records indicate that you have declined MyChart signup. October 2017 Details    Sun Mon Tue Wed Thu Fri Sat     1               2               3               4      2.5 mg   See details      5      2.5 mg         6      5 mg         7      2.5 mg           8      2.5 mg         9      5 mg         10      2.5 mg         11      2.5 mg         12      2.5 mg         13      5 mg         14      2.5 mg           15      2.5 mg         16      5 mg         17      2.5 mg         18      2.5 mg         19      2.5 mg         20      5 mg         21      2.5 mg           22      2.5 mg         23      5 mg         24      2.5 mg         25      2.5 mg         26      2.5 mg         27      5 mg         28      2.5 mg           29      2.5 mg         30      5 mg         31      2.5 mg              Date Details   10/04 This INR check               How to take your warfarin dose              To take:  2.5 mg Take 0.5 of a 5 mg tablet. To take:  5 mg Take 1 of the 5 mg tablets.            November 2017 Details    Sun Mon Tue Wed Thu Fri Sat        1      2.5 mg         2               3               4                 5               6               7               8               9               10               11                 12               13               14               15               16               17               18                 19               20               21               22               23               24               25                 26               27               28               29               30                  Date Details   No additional details    Date of next INR:  11/1/2017         How to take your warfarin dose

## 2017-10-09 ENCOUNTER — OFFICE VISIT (OUTPATIENT)
Dept: CARDIOLOGY CLINIC | Age: 52
End: 2017-10-09
Payer: COMMERCIAL

## 2017-10-09 VITALS
HEIGHT: 74 IN | WEIGHT: 275 LBS | SYSTOLIC BLOOD PRESSURE: 128 MMHG | DIASTOLIC BLOOD PRESSURE: 64 MMHG | HEART RATE: 51 BPM | BODY MASS INDEX: 35.29 KG/M2

## 2017-10-09 DIAGNOSIS — I48.0 PAROXYSMAL ATRIAL FIBRILLATION (HCC): Primary | ICD-10-CM

## 2017-10-09 DIAGNOSIS — Z95.810 S/P ICD (INTERNAL CARDIAC DEFIBRILLATOR) PROCEDURE: ICD-10-CM

## 2017-10-09 DIAGNOSIS — I50.22 CHRONIC SYSTOLIC CONGESTIVE HEART FAILURE (HCC): ICD-10-CM

## 2017-10-09 DIAGNOSIS — I10 ESSENTIAL HYPERTENSION: ICD-10-CM

## 2017-10-09 DIAGNOSIS — I42.0 DILATED CARDIOMYOPATHY (HCC): ICD-10-CM

## 2017-10-09 PROCEDURE — 93000 ELECTROCARDIOGRAM COMPLETE: CPT | Performed by: PHYSICIAN ASSISTANT

## 2017-10-09 PROCEDURE — 99213 OFFICE O/P EST LOW 20 MIN: CPT | Performed by: PHYSICIAN ASSISTANT

## 2017-10-09 RX ORDER — SIMVASTATIN 20 MG
20 TABLET ORAL NIGHTLY
Qty: 90 TABLET | Refills: 0 | Status: SHIPPED | OUTPATIENT
Start: 2017-10-09 | End: 2017-12-14 | Stop reason: SDUPTHER

## 2017-10-09 NOTE — PROGRESS NOTES
Spouse name: N/A    Number of children: 3    Years of education: N/A     Occupational History     [place] [address]     Social History Main Topics    Smoking status: Current Every Day Smoker     Packs/day: 0.50     Years: 32.00     Types: Cigarettes     Start date: 7/16/1980     Last attempt to quit: 1/20/2016    Smokeless tobacco: Former User    Alcohol use No    Drug use: No    Sexual activity: Not Currently     Partners: Female     Other Topics Concern    None     Social History Narrative    None       Family History   Problem Relation Age of Onset    Diabetes Mother     High Blood Pressure Mother     Stroke Mother     Diabetes Father     Heart Disease Father     High Blood Pressure Father     Heart Disease Sister      CABG    Diabetes Maternal Grandmother     Diabetes Maternal Grandfather     Heart Disease Paternal Grandfather        Blood pressure 128/64, pulse 51, height 6' 2\" (1.88 m), weight 275 lb (124.7 kg). General:   Well developed, well nourished  Lungs:   Clear to auscultation  Heart:    Normal S1 S2, No murmur, rubs, or gallops  Abdomen:   Soft, non tender, no organomegalies, positive bowel sounds  Extremities:   No edema, no cyanosis, good peripheral pulses  Neurological:   Awake, alert, oriented. No obvious focal deficits  Musculoskeletal:  No obvious deformities    EKG:  Sinus bradycardia        1. Paroxysmal atrial fibrillation (HCC)  EKG 12 Lead    CANCELED: EKG 12 Lead    CANCELED: EKG 12 Lead   2. S/P ICD (internal cardiac defibrillator) procedure     3. Dilated cardiomyopathy ([address]     4. Essential hypertension     5. Chronic systolic congestive heart failure ([address]         Orders Placed This Encounter   Procedures    EKG 12 Lead     Order Specific Question:   Reason for Exam?     Answer: Other     Continue Dr Allyson Reaves current treatment plan    We will keep him at his current dose of Entresto at this time.     Continue same current medications and with constant vigilance to changes in symptoms and also any potential side effects. Return for care if become worse or seek medical attention immediately. The patient is educated on symptoms of heart disease that include chest pain and passing out, dizziness, etc and to report them if there is any change or go to emergency room.         Follow up with Dr Tam Worley as scheduled or sooner if needed  (Please note that portions of this note were completed with a voice recognition program.  Efforts were made to edit the dictation but occasionally words are mis-transcribed.)      Marisa Ruiz PA-C

## 2017-10-13 ENCOUNTER — PROCEDURE VISIT (OUTPATIENT)
Dept: CARDIOLOGY CLINIC | Age: 52
End: 2017-10-13
Payer: COMMERCIAL

## 2017-10-13 DIAGNOSIS — Z95.810 S/P ICD (INTERNAL CARDIAC DEFIBRILLATOR) PROCEDURE: ICD-10-CM

## 2017-10-13 DIAGNOSIS — I50.20 SYSTOLIC CONGESTIVE HEART FAILURE, UNSPECIFIED CONGESTIVE HEART FAILURE CHRONICITY: Primary | ICD-10-CM

## 2017-10-13 PROCEDURE — 93297 REM INTERROG DEV EVAL ICPMS: CPT | Performed by: NUCLEAR MEDICINE

## 2017-10-23 NOTE — PROGRESS NOTES
9.2 years on device   [facility] Pharmacist Admission Medication Reconciliation Pending Note    Prior to Admission Medications were reviewed by the pharmacist and pended for provider review during admission medication reconciliation.    Medications were pended by the pharmacist at this time as follows:    Pended Admission Order Reconciliation Actions     Order Name Action Reordered As    aspirin 81 MG EC tablet Order for Admission aspirin (ECOTRIN) enteric coated tablet 81 mg            Orders Pended To Continue For Hospital Stay     ID Description Pended By When Reason    [id number] aspirin (ECOTRIN) enteric coated tablet 81 mg-DAILY Angie Fine RPH 10/23/17 1129             Pharmacist Notations:           Orders that are ultimately reconciled and signed during admission medication reconciliation may differ from the pended actions above.    Please contact the pharmacist for questions.    Angie Fine RPH  10/23/2017 11:29 AM

## 2017-10-31 DIAGNOSIS — I48.0 PAROXYSMAL ATRIAL FIBRILLATION (HCC): ICD-10-CM

## 2017-10-31 RX ORDER — WARFARIN SODIUM 5 MG/1
TABLET ORAL
Qty: 45 TABLET | Refills: 0 | OUTPATIENT
Start: 2017-10-31

## 2017-10-31 RX ORDER — WARFARIN SODIUM 5 MG/1
TABLET ORAL
Qty: 45 TABLET | Refills: 11 | Status: ON HOLD | OUTPATIENT
Start: 2017-10-31 | End: 2017-12-29 | Stop reason: HOSPADM

## 2017-11-03 ENCOUNTER — TELEPHONE (OUTPATIENT)
Dept: CARDIOLOGY CLINIC | Age: 52
End: 2017-11-03

## 2017-11-05 DIAGNOSIS — F41.3 OTHER MIXED ANXIETY DISORDERS: ICD-10-CM

## 2017-11-06 RX ORDER — ALPRAZOLAM 0.5 MG/1
TABLET ORAL
Qty: 30 TABLET | Refills: 5 | Status: ON HOLD | OUTPATIENT
Start: 2017-11-06 | End: 2018-01-18 | Stop reason: HOSPADM

## 2017-11-14 ENCOUNTER — HOSPITAL ENCOUNTER (OUTPATIENT)
Dept: PHARMACY | Age: 52
Setting detail: THERAPIES SERIES
Discharge: HOME | End: 2017-11-14
Payer: COMMERCIAL

## 2017-11-14 VITALS
DIASTOLIC BLOOD PRESSURE: 92 MMHG | WEIGHT: 274.8 LBS | HEART RATE: 52 BPM | BODY MASS INDEX: 35.28 KG/M2 | SYSTOLIC BLOOD PRESSURE: 134 MMHG

## 2017-11-14 DIAGNOSIS — I48.0 PAROXYSMAL ATRIAL FIBRILLATION (HCC): ICD-10-CM

## 2017-11-14 LAB — POC INR: 2.3 (ref 0.8–1.2)

## 2017-11-14 PROCEDURE — 85610 PROTHROMBIN TIME: CPT

## 2017-11-14 PROCEDURE — 36416 COLLJ CAPILLARY BLOOD SPEC: CPT

## 2017-11-14 PROCEDURE — 99211 OFF/OP EST MAY X REQ PHY/QHP: CPT

## 2017-11-14 ASSESSMENT — ENCOUNTER SYMPTOMS
DIARRHEA: 0
CONSTIPATION: 0
BLOOD IN STOOL: 0
SHORTNESS OF BREATH: 0

## 2017-12-12 ENCOUNTER — APPOINTMENT (OUTPATIENT)
Dept: CT IMAGING | Age: 52
End: 2017-12-12
Payer: COMMERCIAL

## 2017-12-12 ENCOUNTER — HOSPITAL ENCOUNTER (EMERGENCY)
Age: 52
Discharge: HOME | End: 2017-12-12
Payer: COMMERCIAL

## 2017-12-12 ENCOUNTER — HOSPITAL ENCOUNTER (OUTPATIENT)
Dept: PHARMACY | Age: 52
Setting detail: THERAPIES SERIES
Discharge: HOME | End: 2017-12-12
Payer: COMMERCIAL

## 2017-12-12 VITALS
RESPIRATION RATE: 18 BRPM | HEART RATE: 72 BPM | WEIGHT: 250 LBS | BODY MASS INDEX: 32.1 KG/M2 | SYSTOLIC BLOOD PRESSURE: 160 MMHG | OXYGEN SATURATION: 97 % | DIASTOLIC BLOOD PRESSURE: 77 MMHG | TEMPERATURE: 98.2 F

## 2017-12-12 DIAGNOSIS — S39.012A STRAIN OF LUMBAR REGION, INITIAL ENCOUNTER: Primary | ICD-10-CM

## 2017-12-12 DIAGNOSIS — I48.0 PAROXYSMAL ATRIAL FIBRILLATION (HCC): ICD-10-CM

## 2017-12-12 DIAGNOSIS — K80.20 GALL STONES: ICD-10-CM

## 2017-12-12 DIAGNOSIS — K86.9 PANCREATIC LESION: ICD-10-CM

## 2017-12-12 DIAGNOSIS — M48.061 SPINAL STENOSIS OF LUMBAR REGION WITHOUT NEUROGENIC CLAUDICATION: ICD-10-CM

## 2017-12-12 DIAGNOSIS — M62.838 SPASM OF MUSCLE: ICD-10-CM

## 2017-12-12 LAB
ALBUMIN SERPL-MCNC: 2.8 G/DL (ref 3.5–5.1)
ALP BLD-CCNC: 74 U/L (ref 38–126)
ALT SERPL-CCNC: 15 U/L (ref 11–66)
ANION GAP SERPL CALCULATED.3IONS-SCNC: 12 MEQ/L (ref 8–16)
APTT: 43.2 SECONDS (ref 22–38)
AST SERPL-CCNC: 19 U/L (ref 5–40)
BACTERIA: ABNORMAL /HPF
BASOPHILS # BLD: 1 %
BASOPHILS ABSOLUTE: 0.1 THOU/MM3 (ref 0–0.1)
BILIRUB SERPL-MCNC: 0.4 MG/DL (ref 0.3–1.2)
BILIRUBIN URINE: NEGATIVE
BLOOD, URINE: ABNORMAL
BUN BLDV-MCNC: 21 MG/DL (ref 7–22)
CALCIUM SERPL-MCNC: 8.2 MG/DL (ref 8.5–10.5)
CASTS 2: ABNORMAL /LPF
CASTS UA: ABNORMAL /LPF
CHARACTER, URINE: CLEAR
CHLORIDE BLD-SCNC: 101 MEQ/L (ref 98–111)
CO2: 23 MEQ/L (ref 23–33)
COLOR: YELLOW
CREAT SERPL-MCNC: 1.3 MG/DL (ref 0.4–1.2)
CRYSTALS, UA: ABNORMAL
EKG ATRIAL RATE: 57 BPM
EKG P AXIS: 36 DEGREES
EKG P-R INTERVAL: 194 MS
EKG Q-T INTERVAL: 466 MS
EKG QRS DURATION: 94 MS
EKG QTC CALCULATION (BAZETT): 453 MS
EKG R AXIS: 18 DEGREES
EKG T AXIS: 139 DEGREES
EKG VENTRICULAR RATE: 57 BPM
EOSINOPHIL # BLD: 2.1 %
EOSINOPHILS ABSOLUTE: 0.2 THOU/MM3 (ref 0–0.4)
EPITHELIAL CELLS, UA: ABNORMAL /HPF
GFR SERPL CREATININE-BSD FRML MDRD: 58 ML/MIN/1.73M2
GLUCOSE BLD-MCNC: 341 MG/DL (ref 70–108)
GLUCOSE URINE: >= 1000 MG/DL
HCT VFR BLD CALC: 47 % (ref 42–52)
HEMOGLOBIN: 16.4 GM/DL (ref 14–18)
INR BLD: 1.84 (ref 0.85–1.13)
KETONES, URINE: NEGATIVE
LEUKOCYTE ESTERASE, URINE: NEGATIVE
LYMPHOCYTES # BLD: 15.6 %
LYMPHOCYTES ABSOLUTE: 1.6 THOU/MM3 (ref 1–4.8)
MCH RBC QN AUTO: 34.2 PG (ref 27–31)
MCHC RBC AUTO-ENTMCNC: 34.9 GM/DL (ref 33–37)
MCV RBC AUTO: 98 FL (ref 80–94)
MISCELLANEOUS 2: ABNORMAL
MONOCYTES # BLD: 7.3 %
MONOCYTES ABSOLUTE: 0.8 THOU/MM3 (ref 0.4–1.3)
NITRITE, URINE: NEGATIVE
NUCLEATED RED BLOOD CELLS: 0 /100 WBC
OSMOLALITY CALCULATION: 288.4 MOSMOL/KG (ref 275–300)
PDW BLD-RTO: 13.8 % (ref 11.5–14.5)
PH UA: 6
PLATELET # BLD: 199 THOU/MM3 (ref 130–400)
PMV BLD AUTO: 8.6 MCM (ref 7.4–10.4)
POC INR: 2.2 (ref 0.8–1.2)
POTASSIUM SERPL-SCNC: 4.2 MEQ/L (ref 3.5–5.2)
PROTEIN UA: >= 1000
RBC # BLD: 4.79 MILL/MM3 (ref 4.7–6.1)
RBC URINE: ABNORMAL /HPF
RENAL EPITHELIAL, UA: ABNORMAL
SEG NEUTROPHILS: 74 %
SEGMENTED NEUTROPHILS ABSOLUTE COUNT: 7.6 THOU/MM3 (ref 1.8–7.7)
SODIUM BLD-SCNC: 136 MEQ/L (ref 135–145)
SPECIFIC GRAVITY, URINE: > 1.03 (ref 1–1.03)
TOTAL PROTEIN: 5 G/DL (ref 6.1–8)
UROBILINOGEN, URINE: 0.2 EU/DL
WBC # BLD: 10.3 THOU/MM3 (ref 4.8–10.8)
WBC UA: ABNORMAL /HPF
YEAST: ABNORMAL

## 2017-12-12 PROCEDURE — 85730 THROMBOPLASTIN TIME PARTIAL: CPT

## 2017-12-12 PROCEDURE — 99211 OFF/OP EST MAY X REQ PHY/QHP: CPT

## 2017-12-12 PROCEDURE — 81001 URINALYSIS AUTO W/SCOPE: CPT

## 2017-12-12 PROCEDURE — 74176 CT ABD & PELVIS W/O CONTRAST: CPT

## 2017-12-12 PROCEDURE — 36416 COLLJ CAPILLARY BLOOD SPEC: CPT

## 2017-12-12 PROCEDURE — 85610 PROTHROMBIN TIME: CPT

## 2017-12-12 PROCEDURE — 99284 EMERGENCY DEPT VISIT MOD MDM: CPT

## 2017-12-12 PROCEDURE — 6360000002 HC RX W HCPCS: Performed by: NURSE PRACTITIONER

## 2017-12-12 PROCEDURE — 96372 THER/PROPH/DIAG INJ SC/IM: CPT

## 2017-12-12 PROCEDURE — 76376 3D RENDER W/INTRP POSTPROCES: CPT

## 2017-12-12 PROCEDURE — 93005 ELECTROCARDIOGRAM TRACING: CPT

## 2017-12-12 PROCEDURE — 80053 COMPREHEN METABOLIC PANEL: CPT

## 2017-12-12 PROCEDURE — 36415 COLL VENOUS BLD VENIPUNCTURE: CPT

## 2017-12-12 PROCEDURE — 85025 COMPLETE CBC W/AUTO DIFF WBC: CPT

## 2017-12-12 RX ORDER — ORPHENADRINE CITRATE 30 MG/ML
60 INJECTION INTRAMUSCULAR; INTRAVENOUS ONCE
Status: COMPLETED | OUTPATIENT
Start: 2017-12-12 | End: 2017-12-12

## 2017-12-12 RX ORDER — KETOROLAC TROMETHAMINE 30 MG/ML
30 INJECTION, SOLUTION INTRAMUSCULAR; INTRAVENOUS ONCE
Status: COMPLETED | OUTPATIENT
Start: 2017-12-12 | End: 2017-12-12

## 2017-12-12 RX ORDER — TIZANIDINE 4 MG/1
4 TABLET ORAL EVERY 6 HOURS PRN
Qty: 20 TABLET | Refills: 0 | Status: SHIPPED | OUTPATIENT
Start: 2017-12-12 | End: 2017-12-14 | Stop reason: ALTCHOICE

## 2017-12-12 RX ORDER — NAPROXEN 500 MG/1
500 TABLET ORAL 2 TIMES DAILY WITH MEALS
Qty: 30 TABLET | Refills: 0 | Status: SHIPPED | OUTPATIENT
Start: 2017-12-12 | End: 2017-12-14 | Stop reason: ALTCHOICE

## 2017-12-12 RX ADMIN — ORPHENADRINE CITRATE 60 MG: 30 INJECTION INTRAMUSCULAR; INTRAVENOUS at 21:38

## 2017-12-12 RX ADMIN — KETOROLAC TROMETHAMINE 30 MG: 30 INJECTION, SOLUTION INTRAMUSCULAR at 21:37

## 2017-12-12 ASSESSMENT — ENCOUNTER SYMPTOMS
CHEST TIGHTNESS: 0
PHOTOPHOBIA: 0
NAUSEA: 0
EYE REDNESS: 0
VOMITING: 0
RHINORRHEA: 0
BACK PAIN: 1
BLOOD IN STOOL: 0
COUGH: 0
VOICE CHANGE: 0
ABDOMINAL PAIN: 0
SHORTNESS OF BREATH: 0
WHEEZING: 0
DIARRHEA: 0
CONSTIPATION: 0
DIARRHEA: 0
SINUS PRESSURE: 0
SHORTNESS OF BREATH: 0
COLOR CHANGE: 0
ABDOMINAL DISTENTION: 0
BLOOD IN STOOL: 0
CONSTIPATION: 0
SORE THROAT: 0

## 2017-12-12 ASSESSMENT — PAIN DESCRIPTION - LOCATION: LOCATION: BACK

## 2017-12-12 ASSESSMENT — PAIN DESCRIPTION - DESCRIPTORS: DESCRIPTORS: ACHING

## 2017-12-12 ASSESSMENT — PAIN DESCRIPTION - PAIN TYPE
TYPE: ACUTE PAIN
TYPE: ACUTE PAIN

## 2017-12-12 ASSESSMENT — PAIN SCALES - GENERAL
PAINLEVEL_OUTOF10: 10
PAINLEVEL_OUTOF10: 7
PAINLEVEL_OUTOF10: 10

## 2017-12-12 NOTE — PROGRESS NOTES
[facility]  [facility]tico[facility]  [phone] (phone)  [phone] (fax)  Subjective:      Patient ID: Tha Kim is a 46 y.o. male. HPI  Has diagnosis of paroxysmal atrial fib. , per Dr. Gregg Reina referral - being seen for Warfarin monitoring (4 week visit). Correctly states dose/tablet strength. Denies diet/medication change. Recently had a cold - took nothing. Review of Systems   Constitutional: Negative for activity change, appetite change and fatigue. HENT: Negative for nosebleeds. Respiratory: Negative for shortness of breath. Cardiovascular: Negative for chest pain and leg swelling. Gastrointestinal: Negative for blood in stool, constipation and diarrhea. Genitourinary: Negative for hematuria. Neurological: Negative for weakness, light-headedness and headaches. Hematological: Does not bruise/bleed easily. Objective:   Physical Exam   Constitutional: He is oriented to person, place, and time. He appears well-developed and well-nourished. Pulmonary/Chest: Effort normal.   Neurological: He is alert and oriented to person, place, and time. Skin: Skin is warm and dry. Psychiatric: He has a normal mood and affect. His behavior is normal.       Assessment:      Lab Results   Component Value Date    INR 2.20 (H) 12/12/2017    INR 2.30 (H) 11/14/2017    INR 2.40 (H) 10/04/2017     INR therapeutic - goal 2-3. Recent Labs      12/12/17   1543   INR  2.20*         Plan:    POCT INR ordered/performed/reviewed. Continue Coumadin 5 mg MF, 2.5 mg TWThSS PO. Recheck INR 5 weeks. Patient reminded to call the Anticoagulation Clinic with any signs or symptoms of bleeding or with any medication changes. Patient given instructions utilizing the teach back method. Discharged ambulatory in no apparent distress.

## 2017-12-13 NOTE — ED PROVIDER NOTES
Musculoskeletal: Positive for back pain. Negative for arthralgias, gait problem, joint swelling, neck pain and neck stiffness. Bilateral leg pain   Skin: Negative for color change and rash. Allergic/Immunologic: Negative for immunocompromised state. Neurological: Negative for dizziness, tremors, weakness, light-headedness, numbness and headaches. Hematological: Does not bruise/bleed easily. Psychiatric/Behavioral: Negative for behavioral problems, confusion, decreased concentration, hallucinations, self-injury and suicidal ideas. The patient is not nervous/anxious. PAST MEDICAL HISTORY    has a past medical history of Acute systolic CHF (congestive heart failure) ([facility] Alcohol abuse; Anomalous origin of right coronary artery; Atrial fibrillation ([facility] CAD (coronary artery disease); Cardiomyopathy ([facility] Depression; Diabetes mellitus ([facility] Drug abuse; H/O cardiac catheterization; Hyperlipidemia; Hypertension; Paroxysmal atrial fibrillation ([facility] and V-tach ([facility] SURGICAL HISTORY      has a past surgical history that includes Cardiac catheterization (3/20/14 ); Coronary artery bypass graft (5-9-14); other surgical history (Left, 10/21/14); EKG 12 Lead (7/17/2015); Cardiac defibrillator placement (10/13/15); vascular surgery; and pacemaker placement. CURRENT MEDICATIONS       Discharge Medication List as of 12/12/2017 11:25 PM      CONTINUE these medications which have NOT CHANGED    Details   sacubitril-valsartan (ENTRESTO) 49-51 MG per tablet Take 1 tablet by mouth 2 times daily, Disp-60 tablet, R-5Normal      ALPRAZolam (XANAX) 0.5 MG tablet TAKE 1 TABLET BY MOUTH NIGHTLY AS NEEDED FOR ANXIETY, Disp-30 tablet, R-5Normal      warfarin (COUMADIN) 5 MG tablet Take as directed by [facility]. #45 tablets for 30 days. , Disp-45 tablet, R-11Normal      simvastatin (ZOCOR) 20 MG tablet Take 1 tablet by mouth nightly, Disp-90 tablet, R-0Normal      amiodarone (CORDARONE) 200 MG tablet TAKE 1 TABLET BY MOUTH TWICE DAILY, Disp-60 tablet, R-5Normal      mexiletine (MEXITIL) 150 MG capsule TAKE 2 CAPSULES THREE TIMES A DAY FOR ATRIAL FIBRILLATION, Disp-540 capsule, R-3Normal      aspirin 325 MG EC tablet TAKE 1 TABLET DAILY FOR TREATMENT TO PREVENT BLOOD CLOTTING. TAKE WITH FOOD, Disp-90 tablet, R-3Normal      furosemide (LASIX) 40 MG tablet TAKE 1 TABLET DAILY FOR TREATMENT WITH DIURETIC THERAPY, Disp-90 tablet, R-3Normal      metoprolol tartrate (LOPRESSOR) 50 MG tablet TAKE 1 TABLET THREE TIMES A DAY FOR ATRIAL FIBRILLATION, Disp-270 tablet, R-3Normal      HYDROcodone-acetaminophen (NORCO) 7.5-325 MG per tablet Take 1 tablet by mouth every 6 hours as needed for Pain . Historical Med      metFORMIN (GLUCOPHAGE) 1000 MG tablet TAKE 1 TABLET TWICE A DAY WITH MEALS FOR DIABETES, Disp-180 tablet, R-1Normal      linagliptin (TRADJENTA) 5 MG tablet Take 1 tablet by mouth daily, Disp-30 tablet, R-0Print      potassium chloride (KLOR-CON M) 20 MEQ extended release tablet TAKE 1 TABLET TWICE A DAY, Disp-180 tablet, R-1Normal      finasteride (PROSCAR) 5 MG tablet TK 1 T PO QD, R-3Historical Med      glucose blood VI test strips (PHIL CONTOUR TEST) strip DAILY Starting 2017, Until Discontinued, Disp-300 each, R-3, Normal      tamsulosin (FLOMAX) 0.4 MG capsule Take 0.4 mg by mouth daily Indications: Frequent Urination Historical Med      acetaminophen 650 MG TABS Take 650 mg by mouth every 4 hours as needed, Disp-120 tablet, R-3             ALLERGIES     is allergic to pcn [penicillins] and zoloft [sertraline hcl]. FAMILY HISTORY     indicated that his mother is . He indicated that his father is . He indicated that his sister is alive. He indicated that the status of his maternal grandmother is unknown. He indicated that the status of his maternal grandfather is unknown.  He indicated that the status of his paternal grandfather is unknown.    family history content normal.   Nursing note and vitals reviewed. DIFFERENTIAL DIAGNOSIS:   Kidney stone, muscle spasm, sciatica, aortic aneurysm     DIAGNOSTIC RESULTS     EKG: All EKG's are interpreted by the Emergency Department Physician who either signs or Co-signs this chart in the absence of a cardiologist.    None    RADIOLOGY: non-plain film images(s) such as CT, Ultrasound and MRI are read by the radiologist.  Plain radiographic images are visualized and preliminarily interpreted by the emergency physician unless otherwise stated below. CT LUMBAR RECONSTRUCTION WO POST PROCESS   Final Result    No fracture or subluxation. Right L5 pars defect without evidence of spondylolisthesis. Mild annular bulges from L2-3 through L5-S1 mild to moderate spinal stenosis. Possible impingement upon the right L2 nerve root at L2-3. **This report has been created using voice recognition software. It may contain minor errors which are inherent in voice recognition technology. **         Final report electronically signed by Dr. Morgan Lopez on 12/12/2017 11:11 PM      CT ABDOMEN PELVIS WO IV CONTRAST Additional Contrast? None   Final Result   No acute inflammatory or infectious process in the abdomen or pelvis. No evidence of bowel obstruction. No urinary tract calculi. No hydroureteronephrosis. 1.4 x 1.4 cm hypodense lesion in the tail of the pancreas, cannot exclude benign or malignant neoplasm. See recommendations above. Small calcified gallstones versus calcifications in the gallbladder wall which would be suspicious for porcelain gallbladder which has an increased risk of gallbladder carcinoma. Multiple cystic renal lesions, some of which are atypical appearing. Recommend renal ultrasound correlation. Additional findings as detailed above. **This report has been created using voice recognition software. It may contain minor errors which are inherent in voice recognition technology. **      Final

## 2017-12-13 NOTE — ED NOTES
Pt resting on cot, respires easy and unlabored. Pt appears more comfortable and stated pain has decreased and appears more comrtable. Family at bedside. Will continue to monitor.       Abdifatah Ramos RN  12/12/17 7230

## 2017-12-13 NOTE — ED TRIAGE NOTES
Pt to ED room 31 with c/o lower back pain. Pt screaming he just wants something for the pain. Pt unable to state how the pain started. Pt sated ain in lower back radiates to legs.

## 2017-12-14 ENCOUNTER — OFFICE VISIT (OUTPATIENT)
Dept: FAMILY MEDICINE CLINIC | Age: 52
End: 2017-12-14
Payer: COMMERCIAL

## 2017-12-14 VITALS
HEART RATE: 56 BPM | BODY MASS INDEX: 35.81 KG/M2 | WEIGHT: 279 LBS | RESPIRATION RATE: 14 BRPM | SYSTOLIC BLOOD PRESSURE: 146 MMHG | DIASTOLIC BLOOD PRESSURE: 96 MMHG | OXYGEN SATURATION: 99 % | HEIGHT: 74 IN

## 2017-12-14 DIAGNOSIS — N28.1 KIDNEY CYSTS: ICD-10-CM

## 2017-12-14 DIAGNOSIS — K86.2 PANCREATIC CYST: Primary | ICD-10-CM

## 2017-12-14 DIAGNOSIS — F41.3 OTHER MIXED ANXIETY DISORDERS: ICD-10-CM

## 2017-12-14 DIAGNOSIS — K82.8 PORCELAIN GALLBLADDER: ICD-10-CM

## 2017-12-14 DIAGNOSIS — E78.2 MIXED HYPERLIPIDEMIA: ICD-10-CM

## 2017-12-14 DIAGNOSIS — E11.65 CONTROLLED TYPE 2 DIABETES MELLITUS WITH HYPERGLYCEMIA, WITHOUT LONG-TERM CURRENT USE OF INSULIN (HCC): ICD-10-CM

## 2017-12-14 DIAGNOSIS — I25.5 ISCHEMIC CARDIOMYOPATHY: ICD-10-CM

## 2017-12-14 DIAGNOSIS — S39.012A STRAIN OF LUMBAR REGION, INITIAL ENCOUNTER: ICD-10-CM

## 2017-12-14 DIAGNOSIS — I50.22 CHRONIC SYSTOLIC CONGESTIVE HEART FAILURE, NYHA CLASS 2 (HCC): ICD-10-CM

## 2017-12-14 DIAGNOSIS — Z95.810 S/P ICD (INTERNAL CARDIAC DEFIBRILLATOR) PROCEDURE: ICD-10-CM

## 2017-12-14 DIAGNOSIS — I25.10 CORONARY ARTERY DISEASE INVOLVING NATIVE CORONARY ARTERY OF NATIVE HEART WITHOUT ANGINA PECTORIS: ICD-10-CM

## 2017-12-14 PROCEDURE — 99214 OFFICE O/P EST MOD 30 MIN: CPT | Performed by: NURSE PRACTITIONER

## 2017-12-14 RX ORDER — SIMVASTATIN 20 MG
20 TABLET ORAL NIGHTLY
Qty: 90 TABLET | Refills: 3 | Status: SHIPPED | OUTPATIENT
Start: 2017-12-14 | End: 2018-02-21 | Stop reason: ALTCHOICE

## 2017-12-14 RX ORDER — TRAMADOL HYDROCHLORIDE 50 MG/1
50 TABLET ORAL EVERY 6 HOURS PRN
Qty: 10 TABLET | Refills: 0 | Status: ON HOLD | OUTPATIENT
Start: 2017-12-14 | End: 2017-12-29

## 2017-12-14 RX ORDER — TRAMADOL HYDROCHLORIDE 50 MG/1
50 TABLET ORAL EVERY 6 HOURS PRN
Qty: 10 TABLET | Refills: 0 | Status: SHIPPED | OUTPATIENT
Start: 2017-12-14 | End: 2017-12-14 | Stop reason: SDUPTHER

## 2017-12-14 RX ORDER — TAMSULOSIN HYDROCHLORIDE 0.4 MG/1
0.4 CAPSULE ORAL DAILY
Qty: 30 CAPSULE | Refills: 5 | Status: SHIPPED | OUTPATIENT
Start: 2017-12-14 | End: 2018-01-31 | Stop reason: SDUPTHER

## 2017-12-14 RX ORDER — BACLOFEN 20 MG/1
20 TABLET ORAL 3 TIMES DAILY
Qty: 30 TABLET | Refills: 0 | Status: SHIPPED | OUTPATIENT
Start: 2017-12-14 | End: 2017-12-14 | Stop reason: SDUPTHER

## 2017-12-14 RX ORDER — BACLOFEN 20 MG/1
20 TABLET ORAL 3 TIMES DAILY
Qty: 30 TABLET | Refills: 0 | Status: SHIPPED | OUTPATIENT
Start: 2017-12-14 | End: 2018-01-04 | Stop reason: SDUPTHER

## 2017-12-14 ASSESSMENT — ENCOUNTER SYMPTOMS
ABDOMINAL PAIN: 0
SHORTNESS OF BREATH: 0
BACK PAIN: 1
NAUSEA: 0
COUGH: 0

## 2017-12-14 NOTE — PROGRESS NOTES
Subjective:      Patient ID: Mellissa Phoenix is a 46 y.o. male. HPI:  ED Follow Up    Chief Complaint   Patient presents with    Back Pain     seen at [facility] ED  lumbar strain, gall stones, pancreatic lesion     See in ED on 12/12/17. Findings as below. CT Abdomen:    Impression   No acute inflammatory or infectious process in the abdomen or pelvis. No evidence of bowel obstruction. No urinary tract calculi. No hydroureteronephrosis. 1.4 x 1.4 cm hypodense lesion in the tail of the pancreas, cannot exclude benign or malignant neoplasm. See recommendations above. Small calcified gallstones versus calcifications in the gallbladder wall which would be suspicious for porcelain gallbladder which has an increased risk of gallbladder carcinoma. Multiple cystic renal lesions, some of which are atypical appearing. Recommend renal ultrasound correlation. Additional findings as detailed above.           Back pain acute on chronic. Physical labor at work. Back pain gradually came on. TTP or move. Will get comfort in certain positions but not for long. Pain going down both legs. DEnies leg weakness or foot drag. Denies bowel or bladder changes    CT Lumbar showed mild disc bulges and moderate spinal stenosis at L2. Chronic degenerative changes.      Patient Active Problem List   Diagnosis    CAD (coronary artery disease)    Depression    Hyperlipidemia    Anomalous origin of right coronary artery    Tobacco abuse    Paroxysmal atrial fibrillation (HCC)    [facility]onsust[facility]    Urinary frequency    Left inguinal pain    Congestive heart failure (HCC)    Elevated troponin    Erectile dysfunction    Hypokalemia    Diabetes type 2, controlled ([address]    Primary hypertension    Anticoagulated on Coumadin    Systolic congestive heart failure, NYHA class 2 ([address]    Ischemic cardiomyopathy    S/P ICD (internal cardiac defibrillator) procedure    Anxiety disorder   

## 2017-12-14 NOTE — PROGRESS NOTES
Visit Information    Have you changed or started any medications since your last visit including any over-the-counter medicines, vitamins, or herbal medicines? no   Have you stopped taking any of your medications? Is so, why? -  no  Are you having any side effects from any of your medications? - no    Have you seen any other physician or provider since your last visit? YES  DR Yocasta Rodrigues  Have you had any other diagnostic tests since your last visit? yes - labs Ct   Have you been seen in the emergency room and/or had an admission in a hospital since we last saw you?  yes -  [facility]   Have you had your routine dental cleaning in the past 6 months?  no     Do you have an active MyChart account? If no, what is the barrier?   No:  Pt refused    Patient Care Team:  Randy Marshall NP as PCP - General (Certified Nurse Practitioner)  Pankaj Friend MD as Consulting Physician (Orthopedic Surgery)    Medical History Review  Past Medical, Family, and Social History reviewed and does not contribute to the patient presenting condition    Health Maintenance   Topic Date Due    Pneumococcal med risk (1 of 1 - PPSV23) 05/20/1984    Colon cancer screen colonoscopy  05/20/2015    Lipid screen  07/17/2016    Diabetic microalbuminuria test  12/15/2016    Diabetic retinal exam  12/15/2016    Flu vaccine (1) 09/01/2017    Diabetic hemoglobin A1C test  07/01/2018    Diabetic foot exam  07/07/2018    DTaP/Tdap/Td vaccine (2 - Td) 06/25/2027    Hepatitis C screen  Addressed    HIV screen  Addressed

## 2017-12-14 NOTE — PATIENT INSTRUCTIONS
you feel when you first quit smokeless tobacco are uncomfortable. Your body will miss the nicotine at first, and you may feel short-tempered and grumpy. You may have trouble sleeping or concentrating. Medicine can help you deal with these symptoms. You may struggle with changing your habits and rituals. The last step is the tricky one: Be prepared for the urge to use smokeless tobacco to continue for a time. This is a lot to deal with, but keep at it. You will feel better. Follow-up care is a key part of your treatment and safety. Be sure to make and go to all appointments, and call your doctor if you are having problems. It's also a good idea to know your test results and keep a list of the medicines you take. How can you care for yourself at home? · Ask your family, friends, and coworkers for support. You have a better chance of quitting if you have help and support. · Join a support group for people who are trying to quit using smokeless tobacco.  · Set a quit date. Pick your date carefully so that it is not right in the middle of a big deadline or stressful time. After you quit, do not use smokeless tobacco even once. Get rid of all spit cups, cans, and pouches after your last use. Clean your house and your clothes so that they do not smell of tobacco.  · Learn how to be a non-user. Think about ways you can avoid those things that make you reach for tobacco.  ¨ Learn some ways to deal with cravings, like calling a friend or going for a walk. Cravings often pass. ¨ Avoid situations that put you at greatest risk for using smokeless tobacco. For some people, it is hard to spend time with friends without dipping or chewing. For others, they might skip a coffee break with coworkers who smoke or use smokeless tobacco.  ¨ Change your daily routine. Take a different route to work, or eat a meal in a different place. · Cut down on stress.  Calm yourself or release tension by doing an activity you enjoy, such as

## 2017-12-17 ENCOUNTER — HOSPITAL ENCOUNTER (INPATIENT)
Age: 52
LOS: 12 days | Discharge: HOME | DRG: 551 | End: 2017-12-29
Attending: EMERGENCY MEDICINE
Payer: COMMERCIAL

## 2017-12-17 ENCOUNTER — APPOINTMENT (OUTPATIENT)
Dept: GENERAL RADIOLOGY | Age: 52
DRG: 551 | End: 2017-12-17
Payer: COMMERCIAL

## 2017-12-17 ENCOUNTER — APPOINTMENT (OUTPATIENT)
Dept: CT IMAGING | Age: 52
DRG: 551 | End: 2017-12-17
Payer: COMMERCIAL

## 2017-12-17 DIAGNOSIS — M54.9 INTRACTABLE BACK PAIN: ICD-10-CM

## 2017-12-17 DIAGNOSIS — Z95.810 HISTORY OF PLACEMENT OF INTERNAL CARDIAC DEFIBRILLATOR: ICD-10-CM

## 2017-12-17 DIAGNOSIS — Z95.1 HISTORY OF HEART BYPASS SURGERY: ICD-10-CM

## 2017-12-17 DIAGNOSIS — S39.012A STRAIN OF LUMBAR REGION, INITIAL ENCOUNTER: ICD-10-CM

## 2017-12-17 DIAGNOSIS — I10 UNCONTROLLED HYPERTENSION: ICD-10-CM

## 2017-12-17 DIAGNOSIS — R53.1 WEAKNESS: ICD-10-CM

## 2017-12-17 DIAGNOSIS — Z95.810 PRESENCE OF COMBINATION INTERNAL CARDIAC DEFIBRILLATOR (ICD) AND PACEMAKER: ICD-10-CM

## 2017-12-17 LAB
ALBUMIN SERPL-MCNC: 3.5 G/DL (ref 3.5–5.1)
ALP BLD-CCNC: 78 U/L (ref 38–126)
ALT SERPL-CCNC: 18 U/L (ref 11–66)
ANION GAP SERPL CALCULATED.3IONS-SCNC: 13 MEQ/L (ref 8–16)
AST SERPL-CCNC: 34 U/L (ref 5–40)
AVERAGE GLUCOSE: 225 MG/DL (ref 70–126)
BASOPHILS # BLD: 0.8 %
BASOPHILS ABSOLUTE: 0.1 THOU/MM3 (ref 0–0.1)
BETA-HYDROXYBUTYRATE: 7.83 MG/DL (ref 0.2–2.81)
BILIRUB SERPL-MCNC: 0.6 MG/DL (ref 0.3–1.2)
BILIRUBIN DIRECT: < 0.2 MG/DL (ref 0–0.3)
BUN BLDV-MCNC: 18 MG/DL (ref 7–22)
CALCIUM SERPL-MCNC: 8.9 MG/DL (ref 8.5–10.5)
CHLORIDE BLD-SCNC: 99 MEQ/L (ref 98–111)
CO2: 22 MEQ/L (ref 23–33)
CREAT SERPL-MCNC: 1.3 MG/DL (ref 0.4–1.2)
EKG ATRIAL RATE: 53 BPM
EKG P AXIS: 28 DEGREES
EKG P-R INTERVAL: 176 MS
EKG Q-T INTERVAL: 496 MS
EKG QRS DURATION: 100 MS
EKG QTC CALCULATION (BAZETT): 465 MS
EKG R AXIS: -5 DEGREES
EKG T AXIS: 128 DEGREES
EKG VENTRICULAR RATE: 53 BPM
EOSINOPHIL # BLD: 0.4 %
EOSINOPHILS ABSOLUTE: 0 THOU/MM3 (ref 0–0.4)
GFR SERPL CREATININE-BSD FRML MDRD: 58 ML/MIN/1.73M2
GLUCOSE BLD-MCNC: 231 MG/DL (ref 70–108)
HBA1C MFR BLD: 9.5 % (ref 4.4–6.4)
HCT VFR BLD CALC: 48.5 % (ref 42–52)
HEMOGLOBIN: 16.7 GM/DL (ref 14–18)
INR BLD: 2.57 (ref 0.85–1.13)
LYMPHOCYTES # BLD: 7.3 %
LYMPHOCYTES ABSOLUTE: 0.7 THOU/MM3 (ref 1–4.8)
MAGNESIUM: 2.2 MG/DL (ref 1.6–2.4)
MCH RBC QN AUTO: 33.7 PG (ref 27–31)
MCHC RBC AUTO-ENTMCNC: 34.4 GM/DL (ref 33–37)
MCV RBC AUTO: 97.9 FL (ref 80–94)
MONOCYTES # BLD: 7.1 %
MONOCYTES ABSOLUTE: 0.7 THOU/MM3 (ref 0.4–1.3)
NUCLEATED RED BLOOD CELLS: 0 /100 WBC
OSMOLALITY CALCULATION: 277.5 MOSMOL/KG (ref 275–300)
PDW BLD-RTO: 14.4 % (ref 11.5–14.5)
PLATELET # BLD: 178 THOU/MM3 (ref 130–400)
PMV BLD AUTO: 8.6 MCM (ref 7.4–10.4)
POTASSIUM SERPL-SCNC: 4.5 MEQ/L (ref 3.5–5.2)
RBC # BLD: 4.96 MILL/MM3 (ref 4.7–6.1)
SEG NEUTROPHILS: 84.4 %
SEGMENTED NEUTROPHILS ABSOLUTE COUNT: 8.2 THOU/MM3 (ref 1.8–7.7)
SODIUM BLD-SCNC: 134 MEQ/L (ref 135–145)
TOTAL PROTEIN: 6.4 G/DL (ref 6.1–8)
TROPONIN T: 0.01 NG/ML
WBC # BLD: 9.7 THOU/MM3 (ref 4.8–10.8)

## 2017-12-17 PROCEDURE — 6360000002 HC RX W HCPCS: Performed by: EMERGENCY MEDICINE

## 2017-12-17 PROCEDURE — 83735 ASSAY OF MAGNESIUM: CPT

## 2017-12-17 PROCEDURE — 6370000000 HC RX 637 (ALT 250 FOR IP): Performed by: FAMILY MEDICINE

## 2017-12-17 PROCEDURE — 6370000000 HC RX 637 (ALT 250 FOR IP): Performed by: EMERGENCY MEDICINE

## 2017-12-17 PROCEDURE — 82010 KETONE BODYS QUAN: CPT

## 2017-12-17 PROCEDURE — 2580000003 HC RX 258: Performed by: INTERNAL MEDICINE

## 2017-12-17 PROCEDURE — 70450 CT HEAD/BRAIN W/O DYE: CPT

## 2017-12-17 PROCEDURE — 99223 1ST HOSP IP/OBS HIGH 75: CPT | Performed by: FAMILY MEDICINE

## 2017-12-17 PROCEDURE — 82248 BILIRUBIN DIRECT: CPT

## 2017-12-17 PROCEDURE — 84484 ASSAY OF TROPONIN QUANT: CPT

## 2017-12-17 PROCEDURE — 80053 COMPREHEN METABOLIC PANEL: CPT

## 2017-12-17 PROCEDURE — 83036 HEMOGLOBIN GLYCOSYLATED A1C: CPT

## 2017-12-17 PROCEDURE — 36415 COLL VENOUS BLD VENIPUNCTURE: CPT

## 2017-12-17 PROCEDURE — 85025 COMPLETE CBC W/AUTO DIFF WBC: CPT

## 2017-12-17 PROCEDURE — 85610 PROTHROMBIN TIME: CPT

## 2017-12-17 PROCEDURE — 6360000002 HC RX W HCPCS: Performed by: FAMILY MEDICINE

## 2017-12-17 PROCEDURE — 2580000003 HC RX 258: Performed by: EMERGENCY MEDICINE

## 2017-12-17 PROCEDURE — 99285 EMERGENCY DEPT VISIT HI MDM: CPT

## 2017-12-17 PROCEDURE — 96372 THER/PROPH/DIAG INJ SC/IM: CPT

## 2017-12-17 PROCEDURE — 2140000000 HC CCU INTERMEDIATE R&B

## 2017-12-17 PROCEDURE — 73030 X-RAY EXAM OF SHOULDER: CPT

## 2017-12-17 PROCEDURE — 2500000003 HC RX 250 WO HCPCS: Performed by: EMERGENCY MEDICINE

## 2017-12-17 PROCEDURE — 93005 ELECTROCARDIOGRAM TRACING: CPT

## 2017-12-17 PROCEDURE — 2500000003 HC RX 250 WO HCPCS: Performed by: INTERNAL MEDICINE

## 2017-12-17 RX ORDER — DEXAMETHASONE SODIUM PHOSPHATE 4 MG/ML
4 INJECTION, SOLUTION INTRA-ARTICULAR; INTRALESIONAL; INTRAMUSCULAR; INTRAVENOUS; SOFT TISSUE EVERY 6 HOURS
Status: DISCONTINUED | OUTPATIENT
Start: 2017-12-17 | End: 2017-12-19

## 2017-12-17 RX ORDER — NITROGLYCERIN 20 MG/100ML
20 INJECTION INTRAVENOUS CONTINUOUS
Status: DISCONTINUED | OUTPATIENT
Start: 2017-12-17 | End: 2017-12-18

## 2017-12-17 RX ORDER — METOPROLOL TARTRATE 50 MG/1
50 TABLET, FILM COATED ORAL ONCE
Status: COMPLETED | OUTPATIENT
Start: 2017-12-17 | End: 2017-12-17

## 2017-12-17 RX ORDER — WARFARIN SODIUM 5 MG/1
5 TABLET ORAL DAILY
Status: DISCONTINUED | OUTPATIENT
Start: 2017-12-17 | End: 2017-12-17 | Stop reason: DRUGHIGH

## 2017-12-17 RX ORDER — 0.9 % SODIUM CHLORIDE 0.9 %
500 INTRAVENOUS SOLUTION INTRAVENOUS ONCE
Status: COMPLETED | OUTPATIENT
Start: 2017-12-17 | End: 2017-12-17

## 2017-12-17 RX ORDER — ORPHENADRINE CITRATE 30 MG/ML
60 INJECTION INTRAMUSCULAR; INTRAVENOUS ONCE
Status: COMPLETED | OUTPATIENT
Start: 2017-12-17 | End: 2017-12-17

## 2017-12-17 RX ORDER — BACLOFEN 10 MG/1
20 TABLET ORAL 3 TIMES DAILY
Status: DISCONTINUED | OUTPATIENT
Start: 2017-12-17 | End: 2017-12-19

## 2017-12-17 RX ORDER — METHYLPREDNISOLONE ACETATE 80 MG/ML
80 INJECTION, SUSPENSION INTRA-ARTICULAR; INTRALESIONAL; INTRAMUSCULAR; SOFT TISSUE ONCE
Status: COMPLETED | OUTPATIENT
Start: 2017-12-17 | End: 2017-12-17

## 2017-12-17 RX ORDER — ATORVASTATIN CALCIUM 10 MG/1
10 TABLET, FILM COATED ORAL NIGHTLY
Status: DISCONTINUED | OUTPATIENT
Start: 2017-12-17 | End: 2017-12-20

## 2017-12-17 RX ORDER — BACLOFEN 10 MG/1
10 TABLET ORAL 3 TIMES DAILY
Status: DISCONTINUED | OUTPATIENT
Start: 2017-12-17 | End: 2017-12-17 | Stop reason: DRUGHIGH

## 2017-12-17 RX ORDER — SODIUM CHLORIDE 0.9 % (FLUSH) 0.9 %
10 SYRINGE (ML) INJECTION EVERY 12 HOURS SCHEDULED
Status: DISCONTINUED | OUTPATIENT
Start: 2017-12-17 | End: 2017-12-20 | Stop reason: SDUPTHER

## 2017-12-17 RX ORDER — METOPROLOL TARTRATE 50 MG/1
50 TABLET, FILM COATED ORAL 2 TIMES DAILY
Status: DISCONTINUED | OUTPATIENT
Start: 2017-12-17 | End: 2017-12-19

## 2017-12-17 RX ORDER — FINASTERIDE 5 MG/1
5 TABLET, FILM COATED ORAL DAILY
Status: DISCONTINUED | OUTPATIENT
Start: 2017-12-17 | End: 2017-12-29 | Stop reason: HOSPADM

## 2017-12-17 RX ORDER — TAMSULOSIN HYDROCHLORIDE 0.4 MG/1
0.4 CAPSULE ORAL DAILY
Status: DISCONTINUED | OUTPATIENT
Start: 2017-12-17 | End: 2017-12-29 | Stop reason: HOSPADM

## 2017-12-17 RX ORDER — LORAZEPAM 2 MG/ML
1 INJECTION INTRAMUSCULAR ONCE
Status: DISCONTINUED | OUTPATIENT
Start: 2017-12-17 | End: 2017-12-29 | Stop reason: HOSPADM

## 2017-12-17 RX ORDER — SODIUM CHLORIDE 0.9 % (FLUSH) 0.9 %
10 SYRINGE (ML) INJECTION PRN
Status: DISCONTINUED | OUTPATIENT
Start: 2017-12-17 | End: 2017-12-20 | Stop reason: SDUPTHER

## 2017-12-17 RX ORDER — FUROSEMIDE 40 MG/1
40 TABLET ORAL ONCE
Status: COMPLETED | OUTPATIENT
Start: 2017-12-17 | End: 2017-12-17

## 2017-12-17 RX ORDER — FUROSEMIDE 40 MG/1
40 TABLET ORAL DAILY
Status: DISCONTINUED | OUTPATIENT
Start: 2017-12-17 | End: 2017-12-19

## 2017-12-17 RX ORDER — ONDANSETRON 2 MG/ML
4 INJECTION INTRAMUSCULAR; INTRAVENOUS EVERY 6 HOURS PRN
Status: DISCONTINUED | OUTPATIENT
Start: 2017-12-17 | End: 2017-12-20

## 2017-12-17 RX ORDER — SODIUM CHLORIDE 9 MG/ML
INJECTION, SOLUTION INTRAVENOUS CONTINUOUS
Status: DISCONTINUED | OUTPATIENT
Start: 2017-12-17 | End: 2017-12-18

## 2017-12-17 RX ORDER — ACETAMINOPHEN 325 MG/1
650 TABLET ORAL EVERY 4 HOURS PRN
Status: DISCONTINUED | OUTPATIENT
Start: 2017-12-17 | End: 2017-12-20 | Stop reason: SDUPTHER

## 2017-12-17 RX ORDER — HYDRALAZINE HYDROCHLORIDE 25 MG/1
25 TABLET, FILM COATED ORAL EVERY 6 HOURS PRN
Status: DISCONTINUED | OUTPATIENT
Start: 2017-12-17 | End: 2017-12-20

## 2017-12-17 RX ORDER — ACETAMINOPHEN 325 MG/1
650 TABLET ORAL EVERY 4 HOURS PRN
Status: DISCONTINUED | OUTPATIENT
Start: 2017-12-17 | End: 2017-12-17 | Stop reason: SDUPTHER

## 2017-12-17 RX ORDER — MEXILETINE HYDROCHLORIDE 150 MG/1
300 CAPSULE ORAL EVERY 8 HOURS SCHEDULED
Status: DISCONTINUED | OUTPATIENT
Start: 2017-12-17 | End: 2017-12-29 | Stop reason: HOSPADM

## 2017-12-17 RX ORDER — AMIODARONE HYDROCHLORIDE 200 MG/1
200 TABLET ORAL 2 TIMES DAILY
Status: DISCONTINUED | OUTPATIENT
Start: 2017-12-17 | End: 2017-12-22

## 2017-12-17 RX ORDER — POTASSIUM CHLORIDE 20 MEQ/1
20 TABLET, EXTENDED RELEASE ORAL ONCE
Status: DISCONTINUED | OUTPATIENT
Start: 2017-12-17 | End: 2017-12-29 | Stop reason: HOSPADM

## 2017-12-17 RX ORDER — CLONIDINE HYDROCHLORIDE 0.1 MG/1
0.1 TABLET ORAL ONCE
Status: COMPLETED | OUTPATIENT
Start: 2017-12-17 | End: 2017-12-17

## 2017-12-17 RX ORDER — WARFARIN SODIUM 2.5 MG/1
2.5 TABLET ORAL
Status: COMPLETED | OUTPATIENT
Start: 2017-12-17 | End: 2017-12-18

## 2017-12-17 RX ORDER — SIMVASTATIN 20 MG
20 TABLET ORAL NIGHTLY
Status: DISCONTINUED | OUTPATIENT
Start: 2017-12-17 | End: 2017-12-17 | Stop reason: CLARIF

## 2017-12-17 RX ADMIN — ORPHENADRINE CITRATE 60 MG: 30 INJECTION INTRAMUSCULAR; INTRAVENOUS at 12:59

## 2017-12-17 RX ADMIN — NICARDIPINE HYDROCHLORIDE 5 MG/HR: 2.5 INJECTION INTRAVENOUS at 23:18

## 2017-12-17 RX ADMIN — FUROSEMIDE 40 MG: 40 TABLET ORAL at 13:55

## 2017-12-17 RX ADMIN — HYDROMORPHONE HYDROCHLORIDE 1 MG: 1 INJECTION, SOLUTION INTRAMUSCULAR; INTRAVENOUS; SUBCUTANEOUS at 19:32

## 2017-12-17 RX ADMIN — NITROGLYCERIN 5 MCG/MIN: 20 INJECTION INTRAVENOUS at 17:22

## 2017-12-17 RX ADMIN — HYDROMORPHONE HYDROCHLORIDE 1 MG: 1 INJECTION, SOLUTION INTRAMUSCULAR; INTRAVENOUS; SUBCUTANEOUS at 13:44

## 2017-12-17 RX ADMIN — ATORVASTATIN CALCIUM 10 MG: 10 TABLET, FILM COATED ORAL at 22:12

## 2017-12-17 RX ADMIN — SODIUM CHLORIDE 500 ML: 9 INJECTION, SOLUTION INTRAVENOUS at 17:21

## 2017-12-17 RX ADMIN — METOPROLOL TARTRATE 50 MG: 50 TABLET, FILM COATED ORAL at 13:55

## 2017-12-17 RX ADMIN — METOPROLOL TARTRATE 50 MG: 50 TABLET, FILM COATED ORAL at 22:12

## 2017-12-17 RX ADMIN — BACLOFEN 20 MG: 10 TABLET ORAL at 22:12

## 2017-12-17 RX ADMIN — FUROSEMIDE 40 MG: 40 TABLET ORAL at 22:12

## 2017-12-17 RX ADMIN — METHYLPREDNISOLONE ACETATE 80 MG: 80 INJECTION, SUSPENSION INTRA-ARTICULAR; INTRALESIONAL; INTRAMUSCULAR; SOFT TISSUE at 12:58

## 2017-12-17 RX ADMIN — SODIUM CHLORIDE: 9 INJECTION, SOLUTION INTRAVENOUS at 19:36

## 2017-12-17 RX ADMIN — MEXILETINE HYDROCHLORIDE 300 MG: 150 CAPSULE ORAL at 22:13

## 2017-12-17 RX ADMIN — FINASTERIDE 5 MG: 5 TABLET, FILM COATED ORAL at 22:14

## 2017-12-17 RX ADMIN — AMIODARONE HYDROCHLORIDE 200 MG: 200 TABLET ORAL at 22:13

## 2017-12-17 RX ADMIN — DEXAMETHASONE SODIUM PHOSPHATE 4 MG: 4 INJECTION, SOLUTION INTRAMUSCULAR; INTRAVENOUS at 22:14

## 2017-12-17 RX ADMIN — CLONIDINE HYDROCHLORIDE 0.1 MG: 0.1 TABLET ORAL at 15:35

## 2017-12-17 ASSESSMENT — PAIN SCALES - GENERAL
PAINLEVEL_OUTOF10: 8
PAINLEVEL_OUTOF10: 9
PAINLEVEL_OUTOF10: 8
PAINLEVEL_OUTOF10: 9
PAINLEVEL_OUTOF10: 7
PAINLEVEL_OUTOF10: 10

## 2017-12-17 ASSESSMENT — PAIN DESCRIPTION - LOCATION
LOCATION: BACK

## 2017-12-17 ASSESSMENT — ENCOUNTER SYMPTOMS
SORE THROAT: 0
BACK PAIN: 1
RHINORRHEA: 0
EYE REDNESS: 0
SHORTNESS OF BREATH: 0
WHEEZING: 0
DIARRHEA: 0
NAUSEA: 0
COUGH: 0
VOMITING: 0
EYE DISCHARGE: 0
ABDOMINAL PAIN: 0

## 2017-12-17 ASSESSMENT — PAIN DESCRIPTION - ORIENTATION
ORIENTATION: LOWER;MID
ORIENTATION: MID;LOWER
ORIENTATION: LOWER

## 2017-12-17 ASSESSMENT — PAIN DESCRIPTION - PAIN TYPE
TYPE: ACUTE PAIN

## 2017-12-17 ASSESSMENT — PAIN DESCRIPTION - FREQUENCY: FREQUENCY: CONTINUOUS

## 2017-12-17 ASSESSMENT — PAIN DESCRIPTION - ONSET: ONSET: ON-GOING

## 2017-12-17 NOTE — ED PROVIDER NOTES
Nose normal. No nasal deformity. No epistaxis. Mouth/Throat: Oropharynx is clear and moist.   Eyes: Conjunctivae, EOM and lids are normal.   Neck: Trachea normal. No neck rigidity. No tracheal deviation present. Cardiovascular: Normal rate and regular rhythm. Pulses:       Dorsalis pedis pulses are 2+ on the right side, and 2+ on the left side. Posterior tibial pulses are 2+ on the right side, and 2+ on the left side. Pulmonary/Chest: Effort normal. No tachypnea. Abdominal: Soft. He exhibits no distension. Musculoskeletal:        Cervical back: Normal.        Thoracic back: Normal.        Lumbar back: He exhibits tenderness (Midline, left lower, SI joint, sciatic notch). He exhibits normal range of motion, no bony tenderness, no swelling, no edema, no deformity, no laceration and no spasm. Neurological: He is alert. He has normal strength. No sensory deficit. Gait normal. GCS eye subscore is 4. GCS verbal subscore is 5. GCS motor subscore is 6. Skin: Skin is warm, dry and intact. No rash noted. He is not diaphoretic. No pallor. Psychiatric: He has a normal mood and affect. His speech is normal and behavior is normal. Thought content normal. Cognition and memory are normal.   Nursing note and vitals reviewed. MEDICAL DECISION MAKING    DIFFERENTIAL DIAGNOSIS:  Lumbar radiculopathy, sciatica pain, lumbar stenosis, bulging disc      DIAGNOSTIC RESULTS    EKG   None    RADIOLOGY:  I have reviewed radiologic plain film image(s). The plain films will be read or overread by the radiologist.  All other non-plain film images(s) such as CT, Ultrasound and MRI have been read by the radiologist.  XR SHOULDER RIGHT (MIN 2 VIEWS)   Final Result   1. No acute fracture or malalignment. **This report has been created using voice recognition software. It may contain minor errors which are inherent in voice recognition technology. **      Final report electronically signed by Dr. Lizandro Connor on 12/17/2017 3:18 PM      CT HEAD WO CONTRAST   Final Result   1. No mass effect or acute hemorrhage. 2. Mild chronic periventricular small vessel ischemic changes. **This report has been created using voice recognition software. It may contain minor errors which are inherent in voice recognition technology. **      Final report electronically signed by Dr. Siomara Michel on 12/17/2017 2:44 PM          LABS:   Labs Reviewed   BASIC METABOLIC PANEL - Abnormal; Notable for the following:        Result Value    Sodium 134 (*)     CO2 22 (*)     Glucose 231 (*)     CREATININE 1.3 (*)     All other components within normal limits   CBC WITH AUTO DIFFERENTIAL - Abnormal; Notable for the following:     MCV 97.9 (*)     MCH 33.7 (*)     Segs Absolute 8.2 (*)     Lymphocytes # 0.7 (*)     All other components within normal limits   GLOMERULAR FILTRATION RATE, ESTIMATED - Abnormal; Notable for the following:     Est, Glom Filt Rate 58 (*)     All other components within normal limits   BETA-HYDROXYBUTYRATE - Abnormal; Notable for the following:     Beta-Hydroxybutyrate 7.83 (*)     All other components within normal limits   TROPONIN - Abnormal; Notable for the following:     Troponin T 0.013 (*)     All other components within normal limits   HEMOGLOBIN A1C - Abnormal; Notable for the following:     Hemoglobin A1C 9.5 (*)     AVERAGE GLUCOSE 225 (*)     All other components within normal limits   MAGNESIUM   ANION GAP   OSMOLALITY   HEPATIC FUNCTION PANEL   PROTIME-INR     All other unresulted laboratory test above are normal:    Vitals:    Vitals:    12/17/17 1451 12/17/17 1624 12/17/17 1631 12/17/17 1733   BP: (!) 168/104  (!) 195/112 (!) 172/81   Pulse: 84   53   Resp: 15 19  17   Temp:       TempSrc:       SpO2: 97% 99%  99%       EMERGENCY DEPARTMENT COURSE:    Medications   LORazepam (ATIVAN) injection 1 mg (1 mg Intravenous Not Given 12/17/17 1536)   0.9 % sodium chloride infusion (not administered)   0.9

## 2017-12-17 NOTE — H&P
History & Physical        Patient:  Feliz Gonzalez  YOB: 1965    MRN: [id number]     Acct: [de-identified]    PCP: Meka Yanez NP    Date of Admission: 12/17/2017    Date of Service: Pt seen/examined on 12/17/2017  and Admitted to Inpatient with expected LOS greater than two midnights due to medical therapy. Chief Complaint:  Back Pain      History Of Present Illness:    46 y.o. male who presented to [address] [place] [address] with complaints of low back pain that radiates down the back of his thighs. This issue started 3 days ago and came to the ED 2 days ago. Pt was sent home on steroids, flexiril and pain meds. Pt's pain has not improved despite taking his medications so he has now returned. Sitting and lying down makes the pain worse. Standing up makes his pain better. Pt recently fell and landed on his tail bone.     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    Atrial fibrillation ([facility] [address]     CAD (coronary artery disease) 3/25/2014    s/p CABG in may 2014    Cardiomyopathy ([address]     Depression     Diabetes mellitus ([address]     Drug abuse     hx of cacaine abuse, stopped abusing drugs in 2012    H/O cardiac catheterization 4/30/2014    Hyperlipidemia     Hypertension     Paroxysmal atrial fibrillation ([address] 7/22/2014    V tach ([facility] [address]     V-tach [facility])     s/p ablation in dec 2014       Past Surgical History:          Procedure Laterality Date    [facility]/14     [facility]    CARDIAC DEFIBRILLATOR PLACEMENT  10/13/15    CORONARY ARTERY BYPASS GRAFT  5-9-14    3 bypass    EKG 12-LEAD  7/17/2015         OTHER SURGICAL HISTORY Left 10/21/14    Left Bursectomy, I & D Left Elbow - Dr. Daniel Crowell harvest from legs       Medications Prior to MEQ extended release tablet TAKE 1 TABLET TWICE A DAY 6/30/17   Tivis Villegas, NP   finasteride (PROSCAR) 5 MG tablet TK 1 T PO QD 2/14/17   Historical Provider, MD   glucose blood VI test strips (PHIL CONTOUR TEST) strip 1 each by In Vitro route daily 1/5/17   Tivis Villegas, NP   acetaminophen 650 MG TABS Take 650 mg by mouth every 4 hours as needed 1/22/16   Betty Hicks MD       Allergies:  Pcn [penicillins] and Zoloft [sertraline hcl]    Social History:      The patient currently lives at home by himself    TOBACCO:   reports that he has been smoking Cigarettes. He started smoking about 37 years ago. He has a 32.00 pack-year smoking history. He has quit using smokeless tobacco.  ETOH:   reports that he does not drink alcohol. Family History:    Reviewed in detail and negative for DM, CAD, Cancer, CVA. Positive as follows:        Problem Relation Age of Onset    Diabetes Mother     High Blood Pressure Mother     Stroke Mother     Diabetes Father     Heart Disease Father     High Blood Pressure Father     Heart Disease Sister      CABG    Diabetes Maternal Grandmother     Diabetes Maternal Grandfather     Heart Disease Paternal Grandfather        Diet:       REVIEW OF SYSTEMS:   Pertinent positives as noted in the HPI. All other systems reviewed and negative. PHYSICAL EXAM:    BP (!) 195/112   Pulse 84   Temp 98.2 °F (36.8 °C) (Oral)   Resp 19   SpO2 99%     General appearance:  Sitting up in chair sleeping  HEENT:  Normal cephalic, atraumatic without obvious deformity. Neck:  No jugular venous distention. Trachea midline. Respiratory:  Normal respiratory effort. Clear to auscultation, bilaterally without Rales/Wheezes/Rhonchi. Cardiovascular:  Regular rate and rhythm with normal S1/S2 without murmurs, rubs or gallops. Abdomen: Soft, non-tender, non-distended with normal bowel sounds. Skin: Skin color, texture, turgor normal.  No rashes or lesions.   Neurologic:  Unable to assess as Other-    ASSESSMENT/PLAN:  1) Intractable back pain 2/2 to Moderate spinal stenosis with impingement upon the right L2 nerve root at L2-3  - I will place pt on IV Dilaudid 0.5 - 1 mg Q 3 hrs   - I will also place pt on decadron and baclofen for now  - Neurosurgery consulted  - Pain management consulted    2) Uncontrolled HTN/A fib/CAD/ CHF  - BP noted to be elevated most probably 2/2 to #1  - Continue with all of his home meds  - I have placed patient PRN hydralzine  - Pt currently on nitro gtt as started in the ED  - Pt on coumadin - INR level pending  - Pt takes zocor, Entresto, coumadin, Amiodarone, Lasix, Metoprolol    3) DM2  - Check HgA1C  - Hold Tradjenta and metformin  - Place on SSI    4) BPH  - Continue patient on Proscar and flomax    Thank you Cole Wiseman NP for the opportunity to be involved in this patient's care.     Electronically signed by Horace Moore MD on 12/17/2017 at 4:57 PM

## 2017-12-17 NOTE — ED NOTES
Patient medicated for pain via IM injection of dilaudid.  Vitals complete      Alexandra Sales RN  12/17/17 2484

## 2017-12-18 LAB
ANION GAP SERPL CALCULATED.3IONS-SCNC: 17 MEQ/L (ref 8–16)
BUN BLDV-MCNC: 18 MG/DL (ref 7–22)
CALCIUM SERPL-MCNC: 9.1 MG/DL (ref 8.5–10.5)
CHLORIDE BLD-SCNC: 96 MEQ/L (ref 98–111)
CO2: 20 MEQ/L (ref 23–33)
CREAT SERPL-MCNC: 1.4 MG/DL (ref 0.4–1.2)
GFR SERPL CREATININE-BSD FRML MDRD: 53 ML/MIN/1.73M2
GLUCOSE BLD-MCNC: 252 MG/DL (ref 70–108)
HCT VFR BLD CALC: 49.4 % (ref 42–52)
HEMOGLOBIN: 17 GM/DL (ref 14–18)
INR BLD: 2.65 (ref 0.85–1.13)
MCH RBC QN AUTO: 33.7 PG (ref 27–31)
MCHC RBC AUTO-ENTMCNC: 34.4 GM/DL (ref 33–37)
MCV RBC AUTO: 97.9 FL (ref 80–94)
PDW BLD-RTO: 14.3 % (ref 11.5–14.5)
PLATELET # BLD: 210 THOU/MM3 (ref 130–400)
PMV BLD AUTO: 9 MCM (ref 7.4–10.4)
POTASSIUM SERPL-SCNC: 4.4 MEQ/L (ref 3.5–5.2)
RBC # BLD: 5.05 MILL/MM3 (ref 4.7–6.1)
SODIUM BLD-SCNC: 133 MEQ/L (ref 135–145)
WBC # BLD: 13 THOU/MM3 (ref 4.8–10.8)

## 2017-12-18 PROCEDURE — 2580000003 HC RX 258: Performed by: INTERNAL MEDICINE

## 2017-12-18 PROCEDURE — G8978 MOBILITY CURRENT STATUS: HCPCS

## 2017-12-18 PROCEDURE — 6360000002 HC RX W HCPCS: Performed by: FAMILY MEDICINE

## 2017-12-18 PROCEDURE — 6370000000 HC RX 637 (ALT 250 FOR IP): Performed by: FAMILY MEDICINE

## 2017-12-18 PROCEDURE — 85610 PROTHROMBIN TIME: CPT

## 2017-12-18 PROCEDURE — 6370000000 HC RX 637 (ALT 250 FOR IP): Performed by: INTERNAL MEDICINE

## 2017-12-18 PROCEDURE — 2580000003 HC RX 258: Performed by: FAMILY MEDICINE

## 2017-12-18 PROCEDURE — 2500000003 HC RX 250 WO HCPCS: Performed by: INTERNAL MEDICINE

## 2017-12-18 PROCEDURE — G8979 MOBILITY GOAL STATUS: HCPCS

## 2017-12-18 PROCEDURE — 2140000000 HC CCU INTERMEDIATE R&B

## 2017-12-18 PROCEDURE — 6370000000 HC RX 637 (ALT 250 FOR IP): Performed by: NURSE PRACTITIONER

## 2017-12-18 PROCEDURE — 97110 THERAPEUTIC EXERCISES: CPT

## 2017-12-18 PROCEDURE — 85027 COMPLETE CBC AUTOMATED: CPT

## 2017-12-18 PROCEDURE — 80048 BASIC METABOLIC PNL TOTAL CA: CPT

## 2017-12-18 PROCEDURE — 99233 SBSQ HOSP IP/OBS HIGH 50: CPT | Performed by: INTERNAL MEDICINE

## 2017-12-18 PROCEDURE — 36415 COLL VENOUS BLD VENIPUNCTURE: CPT

## 2017-12-18 PROCEDURE — 6370000000 HC RX 637 (ALT 250 FOR IP)

## 2017-12-18 PROCEDURE — 97162 PT EVAL MOD COMPLEX 30 MIN: CPT

## 2017-12-18 RX ORDER — LIDOCAINE 50 MG/G
3 PATCH TOPICAL DAILY
Status: DISCONTINUED | OUTPATIENT
Start: 2017-12-18 | End: 2017-12-29 | Stop reason: HOSPADM

## 2017-12-18 RX ORDER — ALPRAZOLAM 0.25 MG/1
0.25 TABLET ORAL 3 TIMES DAILY PRN
Status: DISCONTINUED | OUTPATIENT
Start: 2017-12-18 | End: 2017-12-29 | Stop reason: HOSPADM

## 2017-12-18 RX ORDER — NICOTINE 21 MG/24HR
1 PATCH, TRANSDERMAL 24 HOURS TRANSDERMAL DAILY
Status: DISCONTINUED | OUTPATIENT
Start: 2017-12-18 | End: 2017-12-29 | Stop reason: HOSPADM

## 2017-12-18 RX ORDER — HYDROCODONE BITARTRATE AND ACETAMINOPHEN 7.5; 325 MG/1; MG/1
1 TABLET ORAL EVERY 6 HOURS PRN
Status: DISCONTINUED | OUTPATIENT
Start: 2017-12-18 | End: 2017-12-29 | Stop reason: HOSPADM

## 2017-12-18 RX ORDER — WARFARIN SODIUM 5 MG/1
5 TABLET ORAL ONCE
Status: COMPLETED | OUTPATIENT
Start: 2017-12-18 | End: 2017-12-18

## 2017-12-18 RX ORDER — WARFARIN SODIUM 5 MG/1
5 TABLET ORAL ONCE
Status: DISCONTINUED | OUTPATIENT
Start: 2017-12-18 | End: 2017-12-18

## 2017-12-18 RX ORDER — FENTANYL 25 UG/H
1 PATCH TRANSDERMAL
Status: DISCONTINUED | OUTPATIENT
Start: 2017-12-18 | End: 2017-12-19

## 2017-12-18 RX ORDER — KETOROLAC TROMETHAMINE 30 MG/ML
30 INJECTION, SOLUTION INTRAMUSCULAR; INTRAVENOUS ONCE
Status: COMPLETED | OUTPATIENT
Start: 2017-12-18 | End: 2017-12-19

## 2017-12-18 RX ORDER — HALOPERIDOL 5 MG/ML
1 INJECTION INTRAMUSCULAR EVERY 6 HOURS PRN
Status: DISCONTINUED | OUTPATIENT
Start: 2017-12-18 | End: 2017-12-22

## 2017-12-18 RX ADMIN — HYDROMORPHONE HYDROCHLORIDE 0.5 MG: 1 INJECTION, SOLUTION INTRAMUSCULAR; INTRAVENOUS; SUBCUTANEOUS at 03:53

## 2017-12-18 RX ADMIN — MEXILETINE HYDROCHLORIDE 300 MG: 150 CAPSULE ORAL at 05:32

## 2017-12-18 RX ADMIN — LINAGLIPTIN 5 MG: 5 TABLET, FILM COATED ORAL at 10:27

## 2017-12-18 RX ADMIN — HYDROMORPHONE HYDROCHLORIDE 1 MG: 1 INJECTION, SOLUTION INTRAMUSCULAR; INTRAVENOUS; SUBCUTANEOUS at 13:53

## 2017-12-18 RX ADMIN — WARFARIN SODIUM 5 MG: 5 TABLET ORAL at 17:46

## 2017-12-18 RX ADMIN — FINASTERIDE 5 MG: 5 TABLET, FILM COATED ORAL at 09:20

## 2017-12-18 RX ADMIN — Medication 1 PUFF: at 22:55

## 2017-12-18 RX ADMIN — ATORVASTATIN CALCIUM 10 MG: 10 TABLET, FILM COATED ORAL at 21:29

## 2017-12-18 RX ADMIN — ALPRAZOLAM 0.25 MG: 0.25 TABLET ORAL at 04:35

## 2017-12-18 RX ADMIN — MEXILETINE HYDROCHLORIDE 300 MG: 150 CAPSULE ORAL at 22:12

## 2017-12-18 RX ADMIN — DEXAMETHASONE SODIUM PHOSPHATE 4 MG: 4 INJECTION, SOLUTION INTRAMUSCULAR; INTRAVENOUS at 22:12

## 2017-12-18 RX ADMIN — TAMSULOSIN HYDROCHLORIDE 0.4 MG: 0.4 CAPSULE ORAL at 09:20

## 2017-12-18 RX ADMIN — AMIODARONE HYDROCHLORIDE 200 MG: 200 TABLET ORAL at 09:20

## 2017-12-18 RX ADMIN — BACLOFEN 20 MG: 10 TABLET ORAL at 09:20

## 2017-12-18 RX ADMIN — Medication 10 ML: at 13:53

## 2017-12-18 RX ADMIN — ACETAMINOPHEN 650 MG: 325 TABLET ORAL at 02:26

## 2017-12-18 RX ADMIN — HYDROMORPHONE HYDROCHLORIDE 1 MG: 1 INJECTION, SOLUTION INTRAMUSCULAR; INTRAVENOUS; SUBCUTANEOUS at 08:52

## 2017-12-18 RX ADMIN — Medication 10 ML: at 21:30

## 2017-12-18 RX ADMIN — MEXILETINE HYDROCHLORIDE 300 MG: 150 CAPSULE ORAL at 13:09

## 2017-12-18 RX ADMIN — DEXAMETHASONE SODIUM PHOSPHATE 4 MG: 4 INJECTION, SOLUTION INTRAMUSCULAR; INTRAVENOUS at 16:43

## 2017-12-18 RX ADMIN — HYDRALAZINE HYDROCHLORIDE 25 MG: 25 TABLET, FILM COATED ORAL at 12:00

## 2017-12-18 RX ADMIN — BACLOFEN 20 MG: 10 TABLET ORAL at 21:30

## 2017-12-18 RX ADMIN — ALPRAZOLAM 0.25 MG: 0.25 TABLET ORAL at 13:08

## 2017-12-18 RX ADMIN — METOPROLOL TARTRATE 50 MG: 50 TABLET, FILM COATED ORAL at 21:29

## 2017-12-18 RX ADMIN — FUROSEMIDE 40 MG: 40 TABLET ORAL at 09:20

## 2017-12-18 RX ADMIN — WARFARIN SODIUM 2.5 MG: 2.5 TABLET ORAL at 00:47

## 2017-12-18 RX ADMIN — BACLOFEN 20 MG: 10 TABLET ORAL at 13:08

## 2017-12-18 RX ADMIN — METOPROLOL TARTRATE 50 MG: 50 TABLET, FILM COATED ORAL at 09:20

## 2017-12-18 RX ADMIN — DEXAMETHASONE SODIUM PHOSPHATE 4 MG: 4 INJECTION, SOLUTION INTRAMUSCULAR; INTRAVENOUS at 03:54

## 2017-12-18 RX ADMIN — DEXAMETHASONE SODIUM PHOSPHATE 4 MG: 4 INJECTION, SOLUTION INTRAMUSCULAR; INTRAVENOUS at 09:20

## 2017-12-18 RX ADMIN — HYDROCODONE BITARTRATE AND ACETAMINOPHEN 1 TABLET: 7.5; 325 TABLET ORAL at 17:46

## 2017-12-18 RX ADMIN — Medication 10 ML: at 09:20

## 2017-12-18 RX ADMIN — ALPRAZOLAM 0.25 MG: 0.25 TABLET ORAL at 21:30

## 2017-12-18 RX ADMIN — AMIODARONE HYDROCHLORIDE 200 MG: 200 TABLET ORAL at 21:30

## 2017-12-18 RX ADMIN — NICARDIPINE HYDROCHLORIDE 5 MG/HR: 2.5 INJECTION INTRAVENOUS at 13:21

## 2017-12-18 ASSESSMENT — PAIN DESCRIPTION - ORIENTATION
ORIENTATION: LOWER

## 2017-12-18 ASSESSMENT — PAIN SCALES - GENERAL
PAINLEVEL_OUTOF10: 3
PAINLEVEL_OUTOF10: 3
PAINLEVEL_OUTOF10: 8
PAINLEVEL_OUTOF10: 5
PAINLEVEL_OUTOF10: 4
PAINLEVEL_OUTOF10: 4
PAINLEVEL_OUTOF10: 7
PAINLEVEL_OUTOF10: 3
PAINLEVEL_OUTOF10: 7

## 2017-12-18 ASSESSMENT — PAIN DESCRIPTION - DESCRIPTORS
DESCRIPTORS: ACHING;SPASM

## 2017-12-18 ASSESSMENT — PAIN DESCRIPTION - PAIN TYPE
TYPE: ACUTE PAIN

## 2017-12-18 ASSESSMENT — PAIN DESCRIPTION - LOCATION
LOCATION: BACK

## 2017-12-18 ASSESSMENT — PAIN DESCRIPTION - ONSET: ONSET: ON-GOING

## 2017-12-18 ASSESSMENT — PAIN DESCRIPTION - FREQUENCY
FREQUENCY: CONTINUOUS
FREQUENCY: CONTINUOUS
FREQUENCY: INTERMITTENT
FREQUENCY: INTERMITTENT

## 2017-12-18 NOTE — PROGRESS NOTES
Throughout the night patient has been acting oddly. He is very restless and antsy. He grimaces/bares his teeth frequently. He will randomly bang his arms/hands against the bed/chair and himself. He is very forgetful; he cannot repeat simple sequences of numbers. He is very repetitive. He also will nod off in the middle of a sentence or action and then jerk himself awake. Patient denies using illicit drugs and he also denies drinking alcohol.

## 2017-12-18 NOTE — PROGRESS NOTES
increased knee flexion throughout, increased reliance on UE's on walker, steady without LOB. Distance: 150 feet    Exercises:  Comments: Pt performs seated B LE AROM: ankle pumps, marches, LAQ, hip abd/add x 10 reps to increase strength for improved gait. Activity Tolerance:  Activity Tolerance: Patient limited by pain    Treatment Initiated: See above exercises. Assessment: Body structures, Functions, Activity limitations: Decreased functional mobility , Decreased endurance, Decreased balance, Decreased strength  Assessment: Pt admitted due to back pain. Pt tolerates session fair, limited by back pain, however, pt states he is much better now then he has been. Pt demonstrates decreased strength, transfers, balance, activity tolerance and gait indicating the need for skilled PT services. Prognosis: Good    Clinical Presentation: Moderate - Evolving with Changing Characteristics: Pt admitted due to back pain. Pt tolerates session fair, limited by back pain, however, pt states he is much better now then he has been. Pt demonstrates decreased strength, transfers, balance, activity tolerance and gait indicating the need for skilled PT services. Decision Making: High Complexitybased on patient assessment and decision making process of determining plan of care and establishing reasonable expectations for measurable functional outcomes    REQUIRES PT FOLLOW UP: Yes  Discharge Recommendations: Continue to assess pending progress, Patient would benefit from continued therapy after discharge, Outpatient PT    Patient Education:  Patient Education: POC    Equipment Recommendations:  Equipment Needed: Yes  Mobility Devices: [place]el: Kvng  Other: May need RW- will continue to monitor needs based on progress    Safety:  Type of devices:  All fall risk precautions in place, Call light within reach, Chair alarm in place, Gait belt, Patient at risk for falls, Left in chair  Restraints  Initially in place: No    Plan:  Times per week: 5 X O  Times per day: Daily  Specific instructions for Next Treatment: B LE strengthening, core strengthening, bed mobility, transfer training, gait training, stair training, HEP. Current Treatment Recommendations: Strengthening, Home Exercise Program, Functional Mobility Training, Transfer Training, Gait Training, Stair training, Equipment Evaluation, Education, & procurement    Goals:  Patient goals : To get better and go home. Short term goals  Time Frame for Short term goals: 2 weeks  Short term goal 1: Pt to transfer supine <--> sit S to enable pt to get in/out of bed. Short term goal 2: Pt to transfer sit <--> stand S for increased functional mobility. Short term goal 3: Pt to ambulate >250 feet with or without RW SBA for household ambulation. Short term goal 4: Pt to ascend/descend 2 steps without HR SBA to enable pt to get in/out of home. Long term goals  Time Frame for Long term goals : NA due to short length of stay. Evaluation Complexity: Based on the findings of patient history, examination, clinical presentation, and decision making during this evaluation, the evaluation of Dulce Lang  is of medium complexity. PT G-Codes  Functional Limitation: Mobility: Walking and moving around  Mobility: Walking and Moving Around Current Status (): At least 40 percent but less than 60 percent impaired, limited or restricted  Mobility: Walking and Moving Around Goal Status ():  At least 40 percent but less than 60 percent impaired, limited or restricted     AM-PAC Inpatient Mobility without Stair Climbing Raw Score : 15  AM-PAC Inpatient without Stair Climbing T-Scale Score : 43.03  Mobility Inpatient CMS 0-100% Score: 47.43  Mobility Inpatient without Stair CMS G-Code Modifier : CK

## 2017-12-18 NOTE — PROGRESS NOTES
Clinical Pharmacy Note    Fernando Aguayo is a 46 y.o. male for whom pharmacy has been asked to manage warfarin therapy. Reason for Admission: Intractable back pain    Consulting Physician: Dr. 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    Atrial fibrillation ([facility] [address]     CAD (coronary artery disease) 3/25/2014    s/p CABG in may 2014    [facility]     Depression     Diabetes mellitus ([facility] [address]     Drug abuse     hx of cacaine abuse, stopped abusing drugs in 2012    H/O cardiac catheterization 4/30/2014    Hyperlipidemia     Hypertension     Paroxysmal atrial fibrillation ([facility] [address] 7/22/2014    [facility]     [facility]     s/p ablation in dec 2014                Recent Labs      12/17/17   2117   INR  2.57*     Recent Labs      12/17/17   1409   HGB  16.7   HCT  48.5   PLT  178       Current warfarin drug-drug interactions: amiodarone (HM)      Date INR Warfarin Dose   12/17/2017 2.57 2.5 mg                                   Daily PT/INR until stable within therapeutic range. Thank you for the consult.      Isha Candelaria, PharmD 12/17/2017  9:51 PM

## 2017-12-18 NOTE — PROGRESS NOTES
2235 - Patient stated that he needed to urinate. Unable to void using the urinal. Commode provided. Turn/pivot to commode with two assist. After sitting down, patient said that his pain was gone.

## 2017-12-19 ENCOUNTER — APPOINTMENT (OUTPATIENT)
Dept: MRI IMAGING | Age: 52
DRG: 551 | End: 2017-12-19
Payer: COMMERCIAL

## 2017-12-19 PROBLEM — R41.0 DELIRIUM: Status: ACTIVE | Noted: 2017-12-19

## 2017-12-19 LAB
ALLEN TEST: POSITIVE
AMMONIA: 31 UMOL/L (ref 11–60)
AMPHETAMINE+METHAMPHETAMINE URINE SCREEN: POSITIVE
ANION GAP SERPL CALCULATED.3IONS-SCNC: 20 MEQ/L (ref 8–16)
BARBITURATE QUANTITATIVE URINE: NEGATIVE
BASE EXCESS (CALCULATED): 0 MMOL/L (ref -2.5–2.5)
BENZODIAZEPINE QUANTITATIVE URINE: NEGATIVE
BUN BLDV-MCNC: 38 MG/DL (ref 7–22)
CALCIUM SERPL-MCNC: 9.1 MG/DL (ref 8.5–10.5)
CANNABINOID QUANTITATIVE URINE: NEGATIVE
CHLORIDE BLD-SCNC: 97 MEQ/L (ref 98–111)
CO2: 20 MEQ/L (ref 23–33)
COCAINE METABOLITE QUANTITATIVE URINE: NEGATIVE
COLLECTED BY:: ABNORMAL
CREAT SERPL-MCNC: 1.9 MG/DL (ref 0.4–1.2)
DEVICE: ABNORMAL
GFR SERPL CREATININE-BSD FRML MDRD: 37 ML/MIN/1.73M2
GLUCOSE BLD-MCNC: 369 MG/DL (ref 70–108)
GLUCOSE BLD-MCNC: 452 MG/DL (ref 70–108)
HCO3: 22 MMOL/L (ref 23–28)
HCT VFR BLD CALC: 48 % (ref 42–52)
HEMOGLOBIN: 16.6 GM/DL (ref 14–18)
MCH RBC QN AUTO: 33.9 PG (ref 27–31)
MCHC RBC AUTO-ENTMCNC: 34.5 GM/DL (ref 33–37)
MCV RBC AUTO: 98.2 FL (ref 80–94)
O2 SATURATION: 95 %
OPIATES, URINE: NEGATIVE
OXYCODONE: NEGATIVE
PCO2: 29 MMHG (ref 35–45)
PDW BLD-RTO: 14.4 % (ref 11.5–14.5)
PH BLOOD GAS: 7.49 (ref 7.35–7.45)
PHENCYCLIDINE QUANTITATIVE URINE: NEGATIVE
PHOSPHORUS: 4.2 MG/DL (ref 2.4–4.7)
PLATELET # BLD: 199 THOU/MM3 (ref 130–400)
PMV BLD AUTO: 9.1 MCM (ref 7.4–10.4)
PO2: 69 MMHG (ref 71–104)
POTASSIUM SERPL-SCNC: 4.5 MEQ/L (ref 3.5–5.2)
RBC # BLD: 4.89 MILL/MM3 (ref 4.7–6.1)
SODIUM BLD-SCNC: 137 MEQ/L (ref 135–145)
SOURCE, BLOOD GAS: ABNORMAL
TOTAL CK: 579 U/L (ref 55–170)
WBC # BLD: 12.6 THOU/MM3 (ref 4.8–10.8)

## 2017-12-19 PROCEDURE — HZ89ZZZ MEDICATION MANAGEMENT FOR SUBSTANCE ABUSE TREATMENT, OTHER REPLACEMENT MEDICATION: ICD-10-PCS | Performed by: INTERNAL MEDICINE

## 2017-12-19 PROCEDURE — 2140000000 HC CCU INTERMEDIATE R&B

## 2017-12-19 PROCEDURE — 2580000003 HC RX 258: Performed by: INTERNAL MEDICINE

## 2017-12-19 PROCEDURE — 80048 BASIC METABOLIC PNL TOTAL CA: CPT

## 2017-12-19 PROCEDURE — 6360000002 HC RX W HCPCS: Performed by: INTERNAL MEDICINE

## 2017-12-19 PROCEDURE — 6370000000 HC RX 637 (ALT 250 FOR IP): Performed by: FAMILY MEDICINE

## 2017-12-19 PROCEDURE — 36600 WITHDRAWAL OF ARTERIAL BLOOD: CPT

## 2017-12-19 PROCEDURE — 82140 ASSAY OF AMMONIA: CPT

## 2017-12-19 PROCEDURE — 82948 REAGENT STRIP/BLOOD GLUCOSE: CPT

## 2017-12-19 PROCEDURE — 6370000000 HC RX 637 (ALT 250 FOR IP): Performed by: INTERNAL MEDICINE

## 2017-12-19 PROCEDURE — 6360000002 HC RX W HCPCS: Performed by: NURSE PRACTITIONER

## 2017-12-19 PROCEDURE — 99233 SBSQ HOSP IP/OBS HIGH 50: CPT | Performed by: INTERNAL MEDICINE

## 2017-12-19 PROCEDURE — 2580000003 HC RX 258: Performed by: FAMILY MEDICINE

## 2017-12-19 PROCEDURE — 82803 BLOOD GASES ANY COMBINATION: CPT

## 2017-12-19 PROCEDURE — 80307 DRUG TEST PRSMV CHEM ANLYZR: CPT

## 2017-12-19 PROCEDURE — 36415 COLL VENOUS BLD VENIPUNCTURE: CPT

## 2017-12-19 PROCEDURE — 85027 COMPLETE CBC AUTOMATED: CPT

## 2017-12-19 PROCEDURE — 72148 MRI LUMBAR SPINE W/O DYE: CPT

## 2017-12-19 PROCEDURE — 6360000002 HC RX W HCPCS: Performed by: FAMILY MEDICINE

## 2017-12-19 PROCEDURE — 82550 ASSAY OF CK (CPK): CPT

## 2017-12-19 PROCEDURE — 84100 ASSAY OF PHOSPHORUS: CPT

## 2017-12-19 RX ORDER — LORAZEPAM 2 MG/1
2 TABLET ORAL
Status: DISCONTINUED | OUTPATIENT
Start: 2017-12-19 | End: 2017-12-29 | Stop reason: HOSPADM

## 2017-12-19 RX ORDER — SODIUM CHLORIDE 9 MG/ML
INJECTION, SOLUTION INTRAVENOUS CONTINUOUS
Status: DISCONTINUED | OUTPATIENT
Start: 2017-12-19 | End: 2017-12-22

## 2017-12-19 RX ORDER — HYDRALAZINE HYDROCHLORIDE 20 MG/ML
10 INJECTION INTRAMUSCULAR; INTRAVENOUS EVERY 6 HOURS PRN
Status: DISCONTINUED | OUTPATIENT
Start: 2017-12-19 | End: 2017-12-20

## 2017-12-19 RX ORDER — LORAZEPAM 2 MG/ML
1 INJECTION INTRAMUSCULAR
Status: DISCONTINUED | OUTPATIENT
Start: 2017-12-19 | End: 2017-12-29 | Stop reason: HOSPADM

## 2017-12-19 RX ORDER — LORAZEPAM 1 MG/1
1 TABLET ORAL
Status: DISCONTINUED | OUTPATIENT
Start: 2017-12-19 | End: 2017-12-29 | Stop reason: HOSPADM

## 2017-12-19 RX ORDER — FOLIC ACID 1 MG/1
1 TABLET ORAL DAILY
Status: DISCONTINUED | OUTPATIENT
Start: 2017-12-19 | End: 2017-12-19 | Stop reason: SDUPTHER

## 2017-12-19 RX ORDER — ALPRAZOLAM 0.5 MG/1
0.5 TABLET ORAL NIGHTLY
Status: DISCONTINUED | OUTPATIENT
Start: 2017-12-19 | End: 2017-12-19

## 2017-12-19 RX ORDER — THIAMINE HCL 50 MG
100 TABLET ORAL DAILY
Status: DISCONTINUED | OUTPATIENT
Start: 2017-12-19 | End: 2017-12-29 | Stop reason: HOSPADM

## 2017-12-19 RX ORDER — METOPROLOL SUCCINATE 50 MG/1
50 TABLET, EXTENDED RELEASE ORAL EVERY 12 HOURS
Status: DISCONTINUED | OUTPATIENT
Start: 2017-12-19 | End: 2017-12-21 | Stop reason: SDUPTHER

## 2017-12-19 RX ORDER — LORAZEPAM 2 MG/ML
2 INJECTION INTRAMUSCULAR
Status: DISCONTINUED | OUTPATIENT
Start: 2017-12-19 | End: 2017-12-29 | Stop reason: HOSPADM

## 2017-12-19 RX ORDER — WARFARIN SODIUM 2.5 MG/1
2.5 TABLET ORAL ONCE
Status: DISCONTINUED | OUTPATIENT
Start: 2017-12-19 | End: 2017-12-19

## 2017-12-19 RX ORDER — INSULIN GLARGINE 100 [IU]/ML
15 INJECTION, SOLUTION SUBCUTANEOUS ONCE
Status: COMPLETED | OUTPATIENT
Start: 2017-12-19 | End: 2017-12-19

## 2017-12-19 RX ORDER — ALPRAZOLAM 0.5 MG/1
0.5 TABLET ORAL ONCE
Status: DISCONTINUED | OUTPATIENT
Start: 2017-12-19 | End: 2017-12-29 | Stop reason: HOSPADM

## 2017-12-19 RX ORDER — LORAZEPAM 2 MG/ML
1 INJECTION INTRAMUSCULAR ONCE
Status: COMPLETED | OUTPATIENT
Start: 2017-12-19 | End: 2017-12-19

## 2017-12-19 RX ORDER — SODIUM CHLORIDE 0.9 % (FLUSH) 0.9 %
10 SYRINGE (ML) INJECTION PRN
Status: DISCONTINUED | OUTPATIENT
Start: 2017-12-19 | End: 2017-12-19 | Stop reason: SDUPTHER

## 2017-12-19 RX ORDER — ALPRAZOLAM 0.5 MG/1
0.5 TABLET ORAL NIGHTLY PRN
Status: DISCONTINUED | OUTPATIENT
Start: 2017-12-19 | End: 2017-12-19

## 2017-12-19 RX ORDER — NICOTINE POLACRILEX 4 MG
15 LOZENGE BUCCAL PRN
Status: DISCONTINUED | OUTPATIENT
Start: 2017-12-19 | End: 2017-12-29 | Stop reason: HOSPADM

## 2017-12-19 RX ORDER — LORAZEPAM 2 MG/ML
3 INJECTION INTRAMUSCULAR
Status: DISCONTINUED | OUTPATIENT
Start: 2017-12-19 | End: 2017-12-29 | Stop reason: HOSPADM

## 2017-12-19 RX ORDER — LORAZEPAM 2 MG/ML
4 INJECTION INTRAMUSCULAR
Status: DISCONTINUED | OUTPATIENT
Start: 2017-12-19 | End: 2017-12-29 | Stop reason: HOSPADM

## 2017-12-19 RX ORDER — SODIUM CHLORIDE 0.9 % (FLUSH) 0.9 %
10 SYRINGE (ML) INJECTION EVERY 12 HOURS SCHEDULED
Status: DISCONTINUED | OUTPATIENT
Start: 2017-12-19 | End: 2017-12-19 | Stop reason: SDUPTHER

## 2017-12-19 RX ORDER — DEXTROSE MONOHYDRATE 25 G/50ML
12.5 INJECTION, SOLUTION INTRAVENOUS PRN
Status: DISCONTINUED | OUTPATIENT
Start: 2017-12-19 | End: 2017-12-29 | Stop reason: HOSPADM

## 2017-12-19 RX ORDER — DEXTROSE AND SODIUM CHLORIDE 5; .9 G/100ML; G/100ML
100 INJECTION, SOLUTION INTRAVENOUS PRN
Status: DISCONTINUED | OUTPATIENT
Start: 2017-12-19 | End: 2017-12-29 | Stop reason: HOSPADM

## 2017-12-19 RX ORDER — DEXAMETHASONE SODIUM PHOSPHATE 4 MG/ML
4 INJECTION, SOLUTION INTRA-ARTICULAR; INTRALESIONAL; INTRAMUSCULAR; INTRAVENOUS; SOFT TISSUE DAILY
Status: DISCONTINUED | OUTPATIENT
Start: 2017-12-20 | End: 2017-12-19

## 2017-12-19 RX ORDER — HYDRALAZINE HYDROCHLORIDE 25 MG/1
25 TABLET, FILM COATED ORAL EVERY 6 HOURS SCHEDULED
Status: DISCONTINUED | OUTPATIENT
Start: 2017-12-19 | End: 2017-12-22

## 2017-12-19 RX ORDER — LORAZEPAM 2 MG/1
4 TABLET ORAL
Status: DISCONTINUED | OUTPATIENT
Start: 2017-12-19 | End: 2017-12-29 | Stop reason: HOSPADM

## 2017-12-19 RX ADMIN — LORAZEPAM 3 MG: 2 INJECTION INTRAMUSCULAR; INTRAVENOUS at 15:49

## 2017-12-19 RX ADMIN — METOPROLOL SUCCINATE 50 MG: 50 TABLET, FILM COATED, EXTENDED RELEASE ORAL at 21:16

## 2017-12-19 RX ADMIN — LORAZEPAM 3 MG: 2 INJECTION INTRAMUSCULAR; INTRAVENOUS at 22:42

## 2017-12-19 RX ADMIN — LORAZEPAM 3 MG: 2 INJECTION INTRAMUSCULAR; INTRAVENOUS at 16:56

## 2017-12-19 RX ADMIN — METOPROLOL SUCCINATE 50 MG: 50 TABLET, FILM COATED, EXTENDED RELEASE ORAL at 07:52

## 2017-12-19 RX ADMIN — FINASTERIDE 5 MG: 5 TABLET, FILM COATED ORAL at 07:57

## 2017-12-19 RX ADMIN — AMIODARONE HYDROCHLORIDE 200 MG: 200 TABLET ORAL at 08:00

## 2017-12-19 RX ADMIN — THIAMINE HCL (VITAMIN B1) 50 MG TABLET 100 MG: at 08:12

## 2017-12-19 RX ADMIN — LORAZEPAM 2 MG: 2 INJECTION INTRAMUSCULAR; INTRAVENOUS at 12:34

## 2017-12-19 RX ADMIN — HYDRALAZINE HYDROCHLORIDE 25 MG: 25 TABLET, FILM COATED ORAL at 21:15

## 2017-12-19 RX ADMIN — ATORVASTATIN CALCIUM 10 MG: 10 TABLET, FILM COATED ORAL at 21:17

## 2017-12-19 RX ADMIN — LORAZEPAM 2 MG: 2 TABLET ORAL at 11:24

## 2017-12-19 RX ADMIN — DEXAMETHASONE SODIUM PHOSPHATE 4 MG: 4 INJECTION, SOLUTION INTRAMUSCULAR; INTRAVENOUS at 03:58

## 2017-12-19 RX ADMIN — LORAZEPAM 3 MG: 2 INJECTION INTRAMUSCULAR; INTRAVENOUS at 13:45

## 2017-12-19 RX ADMIN — KETOROLAC TROMETHAMINE 30 MG: 30 INJECTION, SOLUTION INTRAMUSCULAR at 03:58

## 2017-12-19 RX ADMIN — Medication 10 ML: at 08:00

## 2017-12-19 RX ADMIN — SODIUM CHLORIDE: 9 INJECTION, SOLUTION INTRAVENOUS at 20:29

## 2017-12-19 RX ADMIN — LORAZEPAM 3 MG: 2 INJECTION INTRAMUSCULAR; INTRAVENOUS at 21:02

## 2017-12-19 RX ADMIN — LORAZEPAM 4 MG: 2 INJECTION INTRAMUSCULAR; INTRAVENOUS at 23:48

## 2017-12-19 RX ADMIN — FOLIC ACID 1 MG: 1 TABLET ORAL at 08:12

## 2017-12-19 RX ADMIN — LORAZEPAM 1 MG: 2 INJECTION INTRAMUSCULAR; INTRAVENOUS at 03:38

## 2017-12-19 RX ADMIN — LORAZEPAM 3 MG: 2 INJECTION INTRAMUSCULAR; INTRAVENOUS at 18:58

## 2017-12-19 RX ADMIN — AMIODARONE HYDROCHLORIDE 200 MG: 200 TABLET ORAL at 21:17

## 2017-12-19 RX ADMIN — MEXILETINE HYDROCHLORIDE 300 MG: 150 CAPSULE ORAL at 07:50

## 2017-12-19 RX ADMIN — Medication 7 UNITS: at 22:07

## 2017-12-19 RX ADMIN — ALPRAZOLAM 0.25 MG: 0.25 TABLET ORAL at 07:50

## 2017-12-19 RX ADMIN — LINAGLIPTIN 5 MG: 5 TABLET, FILM COATED ORAL at 07:57

## 2017-12-19 RX ADMIN — INSULIN GLARGINE 15 UNITS: 100 INJECTION, SOLUTION SUBCUTANEOUS at 22:07

## 2017-12-19 RX ADMIN — TAMSULOSIN HYDROCHLORIDE 0.4 MG: 0.4 CAPSULE ORAL at 07:57

## 2017-12-19 RX ADMIN — HYDRALAZINE HYDROCHLORIDE 10 MG: 20 INJECTION INTRAMUSCULAR; INTRAVENOUS at 20:29

## 2017-12-19 RX ADMIN — MEXILETINE HYDROCHLORIDE 300 MG: 150 CAPSULE ORAL at 21:17

## 2017-12-19 ASSESSMENT — PAIN SCALES - GENERAL
PAINLEVEL_OUTOF10: 0

## 2017-12-19 NOTE — PROGRESS NOTES
telling the nurse that he is so anxious and something needs to be done. Paged Cheryl Hawa. 0145Schasa Sonia called back and told the nurse to not give the haldol but to order 1mg Ativan IV once. Orders placed. 0154: Nurse in to see patient. Patient more relaxed and able to lay down. Patient able to fall asleep. Will assess the need for the ativan if patient has anxiety attack again. 0330: Patient suddenly woke up screaming. Nurse ran into room and found patient super confused and thinking he was dreaming. Patient reoriented to surroundings but patient remains very agitated and confused and keeps mentioning having a bad dream. Patient banging hands on bed and very agitated. He states he needs to pee. Patient stood up with two nurses and a walker. Patient through walker and walked fast to the bathroom because he had to pee so bad. Patient states legs are having spasms. Nurse called for help and asked another nurse to place chair behind patient's legs. Patient assisted to sit in the chair. Patient pulled back in the chair to bed.   0338: Ativan given through IV. Patient remains agitated and not making any sense. He makes statements like \"Lou, dreaming, lying. \" Patient having spasms in back and gets very agitated when the spasms occur. Patient asked about drug and alcohol use. He denies use. 5044: Patient provided toradol to help with the spasms. Patient refusing to lay back in bed and sitting on the side of the bed. Patient falling asleep while sitting on the side of the bed. Patient reoriented to his surroundings and told that we need him to safely lay down in order for him to be safe and get some rest. With the help of another nurse, patient able to lay down in bed. Nurse and tech trying to get patient's vitals. Patient refusing to let nurse take vitals. 0430: Dr. Flory Pedersen to the floor and asked to see the patient. Dr. Flory Pedersen in and updated about patient and what he has been doing throughout the night.  Patient remains

## 2017-12-20 PROBLEM — N18.30 CKD (CHRONIC KIDNEY DISEASE), STAGE III (HCC): Status: ACTIVE | Noted: 2017-12-20

## 2017-12-20 PROBLEM — D33.4 SCHWANNOMA OF SPINAL CORD (HCC): Status: ACTIVE | Noted: 2017-12-20

## 2017-12-20 PROBLEM — M62.82 RHABDOMYOLYSIS: Status: ACTIVE | Noted: 2017-12-20

## 2017-12-20 LAB
ALBUMIN SERPL-MCNC: 3 G/DL (ref 3.5–5.1)
ALBUMIN SERPL-MCNC: 3 G/DL (ref 3.5–5.1)
ALP BLD-CCNC: 77 U/L (ref 38–126)
ALP BLD-CCNC: 78 U/L (ref 38–126)
ALT SERPL-CCNC: 25 U/L (ref 11–66)
ALT SERPL-CCNC: 26 U/L (ref 11–66)
AMORPHOUS: ABNORMAL
ANION GAP SERPL CALCULATED.3IONS-SCNC: 14 MEQ/L (ref 8–16)
AST SERPL-CCNC: 38 U/L (ref 5–40)
AST SERPL-CCNC: 41 U/L (ref 5–40)
BACTERIA: ABNORMAL
BILIRUB SERPL-MCNC: 0.7 MG/DL (ref 0.3–1.2)
BILIRUB SERPL-MCNC: 0.7 MG/DL (ref 0.3–1.2)
BILIRUBIN DIRECT: < 0.2 MG/DL (ref 0–0.3)
BILIRUBIN URINE: NEGATIVE
BLOOD, URINE: ABNORMAL
BUN BLDV-MCNC: 31 MG/DL (ref 7–22)
CALCIUM SERPL-MCNC: 9 MG/DL (ref 8.5–10.5)
CASTS: ABNORMAL /LPF
CASTS: ABNORMAL /LPF
CHARACTER, URINE: CLEAR
CHLORIDE BLD-SCNC: 106 MEQ/L (ref 98–111)
CO2: 23 MEQ/L (ref 23–33)
COLOR: YELLOW
CREAT SERPL-MCNC: 1.3 MG/DL (ref 0.4–1.2)
CRYSTALS: ABNORMAL
EPITHELIAL CELLS, UA: ABNORMAL /HPF
GFR SERPL CREATININE-BSD FRML MDRD: 58 ML/MIN/1.73M2
GLUCOSE BLD-MCNC: 153 MG/DL (ref 70–108)
GLUCOSE BLD-MCNC: 158 MG/DL (ref 70–108)
GLUCOSE BLD-MCNC: 179 MG/DL (ref 70–108)
GLUCOSE BLD-MCNC: 186 MG/DL (ref 70–108)
GLUCOSE BLD-MCNC: 199 MG/DL (ref 70–108)
GLUCOSE BLD-MCNC: 214 MG/DL (ref 70–108)
GLUCOSE, URINE: >= 1000 MG/DL
HEPATITIS C ANTIBODY: NEGATIVE
INR BLD: 4.66 (ref 0.85–1.13)
KETONES, URINE: NEGATIVE
LEUKOCYTE EST, POC: NEGATIVE
MISCELLANEOUS LAB TEST RESULT: ABNORMAL
MRSA SCREEN RT-PCR: NEGATIVE
NITRITE, URINE: NEGATIVE
PH UA: 6
POTASSIUM SERPL-SCNC: 3.7 MEQ/L (ref 3.5–5.2)
PROTEIN UA: >= 1000 MG/DL
RBC URINE: ABNORMAL /HPF
RENAL EPITHELIAL, UA: ABNORMAL
SODIUM BLD-SCNC: 143 MEQ/L (ref 135–145)
SPECIFIC GRAVITY UA: > 1.03 (ref 1–1.03)
TOTAL CK: 1094 U/L (ref 55–170)
TOTAL PROTEIN: 5.8 G/DL (ref 6.1–8)
TOTAL PROTEIN: 6 G/DL (ref 6.1–8)
UROBILINOGEN, URINE: 0.2 EU/DL
WBC UA: ABNORMAL /HPF
YEAST: ABNORMAL

## 2017-12-20 PROCEDURE — 86803 HEPATITIS C AB TEST: CPT

## 2017-12-20 PROCEDURE — 6370000000 HC RX 637 (ALT 250 FOR IP): Performed by: INTERNAL MEDICINE

## 2017-12-20 PROCEDURE — 80053 COMPREHEN METABOLIC PANEL: CPT

## 2017-12-20 PROCEDURE — 87389 HIV-1 AG W/HIV-1&-2 AB AG IA: CPT

## 2017-12-20 PROCEDURE — 6360000002 HC RX W HCPCS: Performed by: INTERNAL MEDICINE

## 2017-12-20 PROCEDURE — 2580000003 HC RX 258: Performed by: SURGERY

## 2017-12-20 PROCEDURE — 2000000000 HC ICU R&B

## 2017-12-20 PROCEDURE — 85610 PROTHROMBIN TIME: CPT

## 2017-12-20 PROCEDURE — 2500000003 HC RX 250 WO HCPCS: Performed by: SURGERY

## 2017-12-20 PROCEDURE — S0028 INJECTION, FAMOTIDINE, 20 MG: HCPCS | Performed by: SURGERY

## 2017-12-20 PROCEDURE — 87086 URINE CULTURE/COLONY COUNT: CPT

## 2017-12-20 PROCEDURE — 2580000003 HC RX 258: Performed by: INTERNAL MEDICINE

## 2017-12-20 PROCEDURE — 82550 ASSAY OF CK (CPK): CPT

## 2017-12-20 PROCEDURE — 80076 HEPATIC FUNCTION PANEL: CPT

## 2017-12-20 PROCEDURE — 2580000003 HC RX 258: Performed by: FAMILY MEDICINE

## 2017-12-20 PROCEDURE — 93005 ELECTROCARDIOGRAM TRACING: CPT

## 2017-12-20 PROCEDURE — 99252 IP/OBS CONSLTJ NEW/EST SF 35: CPT | Performed by: NURSE PRACTITIONER

## 2017-12-20 PROCEDURE — 87641 MR-STAPH DNA AMP PROBE: CPT

## 2017-12-20 PROCEDURE — 81001 URINALYSIS AUTO W/SCOPE: CPT

## 2017-12-20 PROCEDURE — 6360000002 HC RX W HCPCS: Performed by: SURGERY

## 2017-12-20 PROCEDURE — 51702 INSERT TEMP BLADDER CATH: CPT

## 2017-12-20 PROCEDURE — 99233 SBSQ HOSP IP/OBS HIGH 50: CPT | Performed by: INTERNAL MEDICINE

## 2017-12-20 PROCEDURE — 36415 COLL VENOUS BLD VENIPUNCTURE: CPT

## 2017-12-20 PROCEDURE — 87081 CULTURE SCREEN ONLY: CPT

## 2017-12-20 PROCEDURE — 6360000002 HC RX W HCPCS: Performed by: PSYCHIATRY & NEUROLOGY

## 2017-12-20 PROCEDURE — 2500000003 HC RX 250 WO HCPCS: Performed by: INTERNAL MEDICINE

## 2017-12-20 PROCEDURE — 82948 REAGENT STRIP/BLOOD GLUCOSE: CPT

## 2017-12-20 PROCEDURE — 99253 IP/OBS CNSLTJ NEW/EST LOW 45: CPT | Performed by: SURGERY

## 2017-12-20 PROCEDURE — 6360000002 HC RX W HCPCS

## 2017-12-20 RX ORDER — ZIPRASIDONE MESYLATE 20 MG/ML
10 INJECTION, POWDER, LYOPHILIZED, FOR SOLUTION INTRAMUSCULAR 3 TIMES DAILY PRN
Status: DISCONTINUED | OUTPATIENT
Start: 2017-12-20 | End: 2017-12-22

## 2017-12-20 RX ORDER — METOPROLOL TARTRATE 5 MG/5ML
5 INJECTION INTRAVENOUS EVERY 6 HOURS PRN
Status: DISCONTINUED | OUTPATIENT
Start: 2017-12-20 | End: 2017-12-29 | Stop reason: HOSPADM

## 2017-12-20 RX ORDER — ONDANSETRON 2 MG/ML
4 INJECTION INTRAMUSCULAR; INTRAVENOUS EVERY 6 HOURS PRN
Status: DISCONTINUED | OUTPATIENT
Start: 2017-12-20 | End: 2017-12-29 | Stop reason: HOSPADM

## 2017-12-20 RX ORDER — HYDRALAZINE HYDROCHLORIDE 20 MG/ML
10 INJECTION INTRAMUSCULAR; INTRAVENOUS EVERY 6 HOURS PRN
Status: DISCONTINUED | OUTPATIENT
Start: 2017-12-20 | End: 2017-12-21

## 2017-12-20 RX ORDER — DEXMEDETOMIDINE HYDROCHLORIDE 4 UG/ML
0.2 INJECTION, SOLUTION INTRAVENOUS CONTINUOUS
Status: DISCONTINUED | OUTPATIENT
Start: 2017-12-20 | End: 2017-12-26

## 2017-12-20 RX ORDER — SODIUM CHLORIDE 0.9 % (FLUSH) 0.9 %
10 SYRINGE (ML) INJECTION PRN
Status: DISCONTINUED | OUTPATIENT
Start: 2017-12-20 | End: 2017-12-29 | Stop reason: HOSPADM

## 2017-12-20 RX ORDER — ACETAMINOPHEN 325 MG/1
650 TABLET ORAL EVERY 4 HOURS PRN
Status: DISCONTINUED | OUTPATIENT
Start: 2017-12-20 | End: 2017-12-29 | Stop reason: HOSPADM

## 2017-12-20 RX ORDER — SODIUM CHLORIDE 0.9 % (FLUSH) 0.9 %
10 SYRINGE (ML) INJECTION EVERY 12 HOURS SCHEDULED
Status: DISCONTINUED | OUTPATIENT
Start: 2017-12-20 | End: 2017-12-29 | Stop reason: HOSPADM

## 2017-12-20 RX ORDER — QUETIAPINE FUMARATE 100 MG/1
100 TABLET, FILM COATED ORAL 3 TIMES DAILY
Status: DISCONTINUED | OUTPATIENT
Start: 2017-12-20 | End: 2017-12-28

## 2017-12-20 RX ADMIN — HYDRALAZINE HYDROCHLORIDE 10 MG: 20 INJECTION INTRAMUSCULAR; INTRAVENOUS at 03:32

## 2017-12-20 RX ADMIN — DEXMEDETOMIDINE HYDROCHLORIDE 0.4 MCG/KG/HR: 4 INJECTION, SOLUTION INTRAVENOUS at 22:25

## 2017-12-20 RX ADMIN — HYDROMORPHONE HYDROCHLORIDE 1 MG: 1 INJECTION, SOLUTION INTRAMUSCULAR; INTRAVENOUS; SUBCUTANEOUS at 16:16

## 2017-12-20 RX ADMIN — HALOPERIDOL LACTATE 1 MG: 5 INJECTION, SOLUTION INTRAMUSCULAR at 22:46

## 2017-12-20 RX ADMIN — HYDRALAZINE HYDROCHLORIDE 10 MG: 20 INJECTION INTRAMUSCULAR; INTRAVENOUS at 09:22

## 2017-12-20 RX ADMIN — ALPRAZOLAM 0.25 MG: 0.25 TABLET ORAL at 02:39

## 2017-12-20 RX ADMIN — DEXMEDETOMIDINE HYDROCHLORIDE 0.3 MCG/KG/HR: 4 INJECTION, SOLUTION INTRAVENOUS at 12:47

## 2017-12-20 RX ADMIN — LORAZEPAM 2 MG: 2 INJECTION INTRAMUSCULAR; INTRAVENOUS at 06:34

## 2017-12-20 RX ADMIN — Medication 10 ML: at 23:27

## 2017-12-20 RX ADMIN — HYDRALAZINE HYDROCHLORIDE 10 MG: 20 INJECTION INTRAMUSCULAR; INTRAVENOUS at 13:14

## 2017-12-20 RX ADMIN — SODIUM CHLORIDE: 9 INJECTION, SOLUTION INTRAVENOUS at 17:52

## 2017-12-20 RX ADMIN — HYDRALAZINE HYDROCHLORIDE 10 MG: 20 INJECTION INTRAMUSCULAR; INTRAVENOUS at 19:14

## 2017-12-20 RX ADMIN — HYDROMORPHONE HYDROCHLORIDE 2 MG: 1 INJECTION, SOLUTION INTRAMUSCULAR; INTRAVENOUS; SUBCUTANEOUS at 12:49

## 2017-12-20 RX ADMIN — HALOPERIDOL LACTATE 1 MG: 5 INJECTION, SOLUTION INTRAMUSCULAR at 04:08

## 2017-12-20 RX ADMIN — HYDROMORPHONE HYDROCHLORIDE 1 MG: 1 INJECTION, SOLUTION INTRAMUSCULAR; INTRAVENOUS; SUBCUTANEOUS at 20:10

## 2017-12-20 RX ADMIN — LORAZEPAM 4 MG: 2 INJECTION INTRAMUSCULAR; INTRAVENOUS at 01:31

## 2017-12-20 RX ADMIN — HYDROMORPHONE HYDROCHLORIDE 1 MG: 1 INJECTION, SOLUTION INTRAMUSCULAR; INTRAVENOUS; SUBCUTANEOUS at 22:13

## 2017-12-20 RX ADMIN — LORAZEPAM 4 MG: 2 INJECTION INTRAMUSCULAR; INTRAVENOUS at 02:56

## 2017-12-20 RX ADMIN — FAMOTIDINE 20 MG: 10 INJECTION, SOLUTION INTRAVENOUS at 22:16

## 2017-12-20 RX ADMIN — HYDROMORPHONE HYDROCHLORIDE 1 MG: 1 INJECTION, SOLUTION INTRAMUSCULAR; INTRAVENOUS; SUBCUTANEOUS at 23:26

## 2017-12-20 RX ADMIN — ZIPRASIDONE MESYLATE 10 MG: 20 INJECTION, POWDER, LYOPHILIZED, FOR SOLUTION INTRAMUSCULAR at 20:41

## 2017-12-20 RX ADMIN — Medication 2 MG: at 12:49

## 2017-12-20 RX ADMIN — INSULIN LISPRO 6 UNITS: 100 INJECTION, SOLUTION INTRAVENOUS; SUBCUTANEOUS at 10:21

## 2017-12-20 RX ADMIN — Medication 10 ML: at 10:33

## 2017-12-20 RX ADMIN — HYDRALAZINE HYDROCHLORIDE 25 MG: 25 TABLET, FILM COATED ORAL at 02:39

## 2017-12-20 RX ADMIN — FAMOTIDINE 20 MG: 10 INJECTION, SOLUTION INTRAVENOUS at 16:12

## 2017-12-20 ASSESSMENT — PAIN SCALES - GENERAL
PAINLEVEL_OUTOF10: 9
PAINLEVEL_OUTOF10: 7
PAINLEVEL_OUTOF10: 10
PAINLEVEL_OUTOF10: 0
PAINLEVEL_OUTOF10: 9

## 2017-12-20 NOTE — CONSULTS
[place], CNP   1 puff at 12/18/17 2255    LORazepam (ATIVAN) injection 1 mg  1 mg Intravenous Once Merlene Adamson MD        amiodarone (CORDARONE) tablet 200 mg  200 mg Oral BID Kaz Barillas MD   200 mg at 12/19/17 2117    finasteride (PROSCAR) tablet 5 mg  5 mg Oral Daily Kaz Barillas MD   5 mg at 12/19/17 0757    mexiletine (MEXITIL) capsule 300 mg  300 mg Oral 3 times per day Kaz Barillas MD   300 mg at 12/19/17 2117    potassium chloride (KLOR-CON M) extended release tablet 20 mEq  20 mEq Oral Once Kaz Barillas MD        [facility] [place]) capsule 0.4 mg  0.4 mg Oral Daily Kaz Barillas MD   0.4 mg at 12/19/17 0757    magnesium hydroxide (MILK OF MAGNESIA) 400 MG/5ML suspension 30 mL  30 mL Oral Daily PRN Kaz Barillas MD              ziprasidone 10 mg TID PRN   sodium chloride flush 10 mL PRN   acetaminophen 650 mg Q4H PRN   ondansetron 4 mg Q6H PRN   HYDROmorphone 1 mg Q1H PRN   metoprolol 5 mg Q6H PRN   hydrALAZINE 10 mg Q6H PRN   LORazepam 1 mg Q1H PRN   Or     LORazepam 1 mg Q1H PRN   Or     LORazepam 2 mg Q1H PRN   Or     LORazepam 2 mg Q1H PRN   Or     LORazepam 3 mg Q1H PRN   Or     LORazepam 3 mg Q1H PRN   Or     LORazepam 4 mg Q1H PRN   Or     LORazepam 4 mg Q1H PRN   glucose 15 g PRN   dextrose 12.5 g PRN   glucagon (rDNA) 1 mg PRN   dextrose 5 % and 0.9 % NaCl 100 mL/hr PRN   ALPRAZolam 0.25 mg TID PRN   HYDROcodone-acetaminophen 1 tablet Q6H PRN   haloperidol lactate 1 mg Q6H PRN   nicotine 1 puff PRN   magnesium hydroxide 30 mL Daily PRN        dexmedetomidine HCl in NaCl 0.3 mcg/kg/hr (12/20/17 1247)    sodium chloride Stopped (12/20/17 0130)    dextrose 5 % and 0.9 % NaCl      niCARdipine in sodium chloride           ALLERGIES:   Pcn [penicillins] and Zoloft [sertraline hcl]    PAST MEDICAL  HISTORY:    has a past medical history of Acute systolic CHF (congestive heart failure) ([facility] [address] Alcohol abuse; Anomalous origin of right coronary artery;  Atrial fibrillation ([address] CAD (coronary artery disease); Cardiomyopathy ([facility] [address] Depression; Diabetes mellitus ([facility] [address] Drug abuse; H/O cardiac catheterization; Hyperlipidemia; Hypertension; Paroxysmal atrial fibrillation ([facility] [address] V tach ([facility] [address] and [facility] PAST SURGICAL  HISTORY:    has a past surgical history that includes Cardiac catheterization (3/20/14 ); Coronary artery bypass graft (5-9-14); other surgical history (Left, 10/21/14); EKG 12 Lead (7/17/2015); Cardiac defibrillator placement (10/13/15); vascular surgery; and pacemaker placement.     SOCIAL HISTORY:   Social History   Substance Use Topics    Smoking status: Current Every Day Smoker     Packs/day: 1.00     Years: 32.00     Types: Cigarettes     Start date: 7/16/1980     Last attempt to quit: 1/20/2016    Smokeless tobacco: Former User    Alcohol use No       FAMILY HISTORY:  Family History   Problem Relation Age of Onset    Diabetes Mother     High Blood Pressure Mother     Stroke Mother     Diabetes Father     Heart Disease Father     High Blood Pressure Father     Heart Disease Sister      CABG    Diabetes Maternal Grandmother     Diabetes Maternal Grandfather     Heart Disease Paternal Grandfather        LABS  CBC with Differential:    Lab Results   Component Value Date    WBC 12.6 12/19/2017    RBC 4.89 12/19/2017    RBC 4.75 11/03/2011    HGB 16.6 12/19/2017    HCT 48.0 12/19/2017     12/19/2017    MCV 98.2 12/19/2017    MCH 33.9 12/19/2017    MCHC 34.5 12/19/2017    RDW 14.4 12/19/2017    NRBC 0 12/17/2017    SEGSPCT 84.4 12/17/2017    MONOPCT 7.1 12/17/2017    MONOSABS 0.7 12/17/2017    LYMPHSABS 0.7 12/17/2017    EOSABS 0.0 12/17/2017    BASOSABS 0.1 12/17/2017     WBC:    Lab Results   Component Value Date    WBC 12.6 12/19/2017     Platelets:    Lab Results   Component Value Date     12/19/2017     Hemoglobin/Hematocrit:    Lab Results   Component Value Date    HGB 16.6 12/19/2017    HCT 48.0 12/19/2017     Potassium:    Lab Results

## 2017-12-20 NOTE — CONSULTS
regular rate and rhythm, no murmur noted  ABDOMEN: Soft, nondistended, nontender  CHEST/BREASTS:  Healed surgical scar from sternotomy  GENITAL/URINARY:  No pringle. MUSCULOSKELETAL:  There is no redness, warmth, or swelling of the joints. Full range of motion noted. Motor strength is 5 out of 5 all extremities bilaterally. Tone is normal.  NEUROLOGIC:  Awake, agitated, not following commands, moving all 4. SKIN:  no bruising or bleeding    DATA:  CBC:   Lab Results   Component Value Date    WBC 12.6 12/19/2017    RBC 4.89 12/19/2017    RBC 4.75 11/03/2011    HGB 16.6 12/19/2017    HCT 48.0 12/19/2017    MCV 98.2 12/19/2017    MCH 33.9 12/19/2017    MCHC 34.5 12/19/2017    RDW 14.4 12/19/2017     12/19/2017    MPV 9.1 12/19/2017     BMP:    Lab Results   Component Value Date     12/20/2017    K 3.7 12/20/2017     12/20/2017    CO2 23 12/20/2017    BUN 31 12/20/2017    LABALBU 3.0 12/20/2017    LABALBU 3.0 12/20/2017    CREATININE 1.3 12/20/2017    CALCIUM 9.0 12/20/2017    LABGLOM 58 12/20/2017    GLUCOSE 199 12/20/2017     Magnesium:    Lab Results   Component Value Date    MG 2.2 12/17/2017     Phosphorus:    Lab Results   Component Value Date    PHOS 4.2 12/19/2017     PT/INR:    Lab Results   Component Value Date    INR 4.66 12/20/2017     ABG:    Lab Results   Component Value Date    PH 7.49 12/19/2017    PCO2 29 12/19/2017    PO2 69 12/19/2017    HCO3 22 12/19/2017    O2SAT 95 12/19/2017     Radiology Review:      CT Head:    1. No mass effect or acute hemorrhage. 2. Mild chronic periventricular small vessel ischemic changes.               **This report has been created using voice recognition software. It may contain minor errors which are inherent in voice recognition technology. **       Final report electronically signed by Dr. Cassius Bamberger on 12/17/2017 2:44 PM     CT Lumbar:    No fracture or subluxation. Right L5 pars defect without evidence of spondylolisthesis.    Mild There is an incompletely imaged posterior spinal canal mass, extending from the superior L1 level to the L1-2 disc space, extending 26 mm craniocaudal. It has anterior posterior diameter of approximately 7.5 mm. Transverse diameter is poorly defined but    estimated at 11 mm. The signal characteristics of the mass or heterogeneous in all 3 series, isointense predominantly in the T2 sequence, having slight hyperintense inferior T1 components which show corresponding hypointense STIR components. Best    demonstrated in the sagittal T2 sequence, there is severe compression of the thecal sac resulting from this, the cauda equina elements deviated anteriorly and severely compressed. The conus medullaris is located just above this level at the superior L1    level. Characteristics warrant consideration of possible cauda equina syndrome.       There is no other significant central canal stenosis. There is bulging disc material and minimal endplate osteophyte formations that produces bilateral mild/moderate foraminal stenosis, right more than left, at L2-3 as well as bilateral moderate severe    foraminal stenosis, right more than left at L3-4 and L4-5. No significant foraminal stenosis at L5-S1.       Surrounding tissues have no concerning characteristics. SEVERELY LIMITED EXAM.       INCOMPLETELY CHARACTERIZED LESION OF THE SPINAL CANAL, EXTRA MEDULLARY, 26 MM X 7.5 MM X 11 MM, TO THE LIMITS DEFINED, LIKELY REPRESENTING SPINAL SCHWANNOMA. SEVERE STENOSIS OF THE CAUDA EQUINA RESULTS FROM THIS. IT IS INCOMPLETELY CHARACTERIZED.       CHRONIC DEGENERATIVE DISC CHANGES AND ADJACENT DEGENERATIVE ENDPLATE CHANGES PRODUCE BILATERAL FORAMINAL STENOSIS, RIGHT MORE THAN LEFT, AT L2-3, L3-4, AND L4-5, MOST IMPRESSIVELY AT THE L3-4 AND L4-5 LEVELS.                     **This report has been created using voice recognition software. It may contain minor errors which are inherent in voice recognition technology. **             controlled  PPI Pepcid    ID:  No ABxs  Monitor WBC and Temp    ENDO:  Glycemic control with ISS  Endocrine consulted    DVT:  INR 4.66 (From Coumadin)      Spent 25 minutes critical care time excluding procedures and teaching.

## 2017-12-20 NOTE — PROGRESS NOTES
[facility]  PHYSICAL THERAPY MISSED TREATMENT NOTE  ACUTE CARE    Date: 2017  Patient Name: Oumou Gomez        MRN: [id number]   : 1965  (46 y.o.)  Gender: male   Referring Practitioner: Dr. Abiel Huber MD  Referring Practitioner: Beverly Greene MD  Diagnosis: Intractable Back Pain  Diagnosis: Intractable back pain         REASON FOR MISSED TREATMENT:  Pt on hold again today, pt confused and agitated and needing sedated will check back with nursing tomorrow.

## 2017-12-20 NOTE — PROGRESS NOTES
Dr. Alex Harden states that he recommends taking patient off of CIWA and do not give anymore ativan. Dr. Alex Harden has spoken with Dr. Indra Jay regarding this.

## 2017-12-20 NOTE — PROGRESS NOTES
Pt's daughter states she went to father's house last night and noted that his tramadol bottle which stated had 10 pills in it still was full.

## 2017-12-20 NOTE — PROGRESS NOTES
[facility]  OCCUPATIONAL THERAPY MISSED TREATMENT NOTE  [facility] CCU-STEPDOWN 3B  3B-23/023-A      Date: 2017  Patient Name: Garry Boothe        CSN: [id number]   : 1965  (46 y.o.)  Gender: male   Referring Practitioner: Dr. Tanya Marquez MD  Diagnosis: Intractable Back Pain         REASON FOR MISSED TREATMENT:  Hold treatment per nursing request.   Pt is being transferred to ICU. Per nursing, pt is needing to be sedated secondary to him abusing himself uncontrollably.

## 2017-12-20 NOTE — PROGRESS NOTES
[I48.0] 07/22/2014     Priority: Medium    Schwannoma of spinal cord ([facility] [D33.4] 12/20/2017    CKD (chronic kidney disease), stage III [N18.3] 12/20/2017    Rhabdomyolysis [M62.82] 12/20/2017    Type 2 diabetes mellitus ([facility] [E11.9]     Chronic systolic CHF (congestive heart failure) EF 30%(HCC) [I50.22]     Essential hypertension [I10] 09/16/2015    Hyperlipidemia [E78.5]        · MRI consistent with spinal cord Schwannoma, agree with neurosurgery consult  · Likely secondary to polysubstance abuse, not responding to current care, start Precedex Drip, will transfer to ICU, consult critical care, will resume care after patient out of ICU  · Rate controlled, anticoagulation on hold due to elevated INR  · As above  · Around baseline, avoid nephrotoxins  · Unclear if traumatic or statin related, hold statin, monitor CK  · ISS, basal insulin and Tradjenta  · Continue Metoprolol, no ACEI or ARB due to CKD  · Elevated due to agitaion  · Statin on hold due to rhabdomyolysis     Diet: Diet NPO, After Midnight  Electronically signed by Johana Kenny MD on 12/20/2017 at 7:51 AM

## 2017-12-20 NOTE — PROGRESS NOTES
1900: Nurse noticed bruises on elbows and legs that were not there the previous night. Patient thrashing in bed. CIWA scale being used. 1941: Dr. Velasquez Meade recalled regarding consult. Message left. Waiting on response. 1947: Dr. Velasquez Meade called back and updated about patient. He was asking why he was on the CIWA scale. The nurse told him about the patient acting super anxious and delirious. He told the nurse that the Ativan may be making the patient worse and Seroquel may be a better idea. He said he will try to see the patient in the morning. 2100: Patient continues to thrash in bed and become aggressive towards the staff. CIWA scale continues to be used.

## 2017-12-20 NOTE — PROGRESS NOTES
[id number] 31 29 02- Pt arrives to ICU from 3B. Placed on bedside monitor. Pt very agitated at this time. Attempting to grab and hit nurses. Dr. Lyles Headings at bedside.   Precedex started at 0.3 mcg/kg/hr    1245- 2 mg of Dilaudid ordered per Dr. Lyles Headings

## 2017-12-20 NOTE — PROGRESS NOTES
Pt thrashing around bed. Smacking himself in chest with hands, Banging his knee caps and ankles together, shaking his arms, and ripping his telemetry monitor. Writer unable to get accurate blood pressure because pt thrashes arms and legs around multiple times that blood pressure was attempted. Pt has continuous sitter.

## 2017-12-20 NOTE — PROGRESS NOTES
Spoke with daughter Josue Cadena in pt's room. Daughter has pt's cell phone, wallet, and watch. Writer updated Josue Cadena on pt's status and his transfer to ICU room 4D02. She has no further questions at this time.

## 2017-12-21 LAB
ANION GAP SERPL CALCULATED.3IONS-SCNC: 10 MEQ/L (ref 8–16)
BASOPHILS # BLD: 0.4 %
BASOPHILS ABSOLUTE: 0.1 THOU/MM3 (ref 0–0.1)
BUN BLDV-MCNC: 24 MG/DL (ref 7–22)
CALCIUM SERPL-MCNC: 8.5 MG/DL (ref 8.5–10.5)
CHLORIDE BLD-SCNC: 109 MEQ/L (ref 98–111)
CO2: 25 MEQ/L (ref 23–33)
CREAT SERPL-MCNC: 1.2 MG/DL (ref 0.4–1.2)
EOSINOPHIL # BLD: 0 %
EOSINOPHILS ABSOLUTE: 0 THOU/MM3 (ref 0–0.4)
GFR SERPL CREATININE-BSD FRML MDRD: 63 ML/MIN/1.73M2
GLUCOSE BLD-MCNC: 103 MG/DL (ref 70–108)
GLUCOSE BLD-MCNC: 122 MG/DL (ref 70–108)
GLUCOSE BLD-MCNC: 201 MG/DL (ref 70–108)
HCT VFR BLD CALC: 45.5 % (ref 42–52)
HEMOGLOBIN: 15.5 GM/DL (ref 14–18)
HIV-2 AB: NEGATIVE
INR BLD: 5.09 (ref 0.85–1.13)
LV EF: 40 %
LVEF MODALITY: NORMAL
LYMPHOCYTES # BLD: 2.6 %
LYMPHOCYTES ABSOLUTE: 0.3 THOU/MM3 (ref 1–4.8)
MACROCYTES: ABNORMAL
MAGNESIUM: 2.1 MG/DL (ref 1.6–2.4)
MCH RBC QN AUTO: 33.7 PG (ref 27–31)
MCHC RBC AUTO-ENTMCNC: 34 GM/DL (ref 33–37)
MCV RBC AUTO: 99.1 FL (ref 80–94)
MONOCYTES # BLD: 10.2 %
MONOCYTES ABSOLUTE: 1.3 THOU/MM3 (ref 0.4–1.3)
NUCLEATED RED BLOOD CELLS: 0 /100 WBC
ORGANISM: ABNORMAL
PDW BLD-RTO: 14.4 % (ref 11.5–14.5)
PHOSPHORUS: 3 MG/DL (ref 2.4–4.7)
PLATELET # BLD: 143 THOU/MM3 (ref 130–400)
PMV BLD AUTO: 8.9 MCM (ref 7.4–10.4)
POTASSIUM SERPL-SCNC: 3.6 MEQ/L (ref 3.5–5.2)
RBC # BLD: 4.6 MILL/MM3 (ref 4.7–6.1)
SEG NEUTROPHILS: 86.8 %
SEGMENTED NEUTROPHILS ABSOLUTE COUNT: 10.9 THOU/MM3 (ref 1.8–7.7)
SODIUM BLD-SCNC: 144 MEQ/L (ref 135–145)
TOTAL CK: 339 U/L (ref 55–170)
URINE CULTURE, ROUTINE: ABNORMAL
WBC # BLD: 12.5 THOU/MM3 (ref 4.8–10.8)

## 2017-12-21 PROCEDURE — 2580000003 HC RX 258: Performed by: SURGERY

## 2017-12-21 PROCEDURE — 2580000003 HC RX 258: Performed by: INTERNAL MEDICINE

## 2017-12-21 PROCEDURE — 2500000003 HC RX 250 WO HCPCS: Performed by: SURGERY

## 2017-12-21 PROCEDURE — 2500000003 HC RX 250 WO HCPCS: Performed by: INTERNAL MEDICINE

## 2017-12-21 PROCEDURE — 2100000000 HC CCU R&B

## 2017-12-21 PROCEDURE — 80048 BASIC METABOLIC PNL TOTAL CA: CPT

## 2017-12-21 PROCEDURE — 6370000000 HC RX 637 (ALT 250 FOR IP): Performed by: INTERNAL MEDICINE

## 2017-12-21 PROCEDURE — 93306 TTE W/DOPPLER COMPLETE: CPT

## 2017-12-21 PROCEDURE — 6360000002 HC RX W HCPCS: Performed by: INTERNAL MEDICINE

## 2017-12-21 PROCEDURE — 82550 ASSAY OF CK (CPK): CPT

## 2017-12-21 PROCEDURE — 36415 COLL VENOUS BLD VENIPUNCTURE: CPT

## 2017-12-21 PROCEDURE — 99233 SBSQ HOSP IP/OBS HIGH 50: CPT | Performed by: INTERNAL MEDICINE

## 2017-12-21 PROCEDURE — 6370000000 HC RX 637 (ALT 250 FOR IP): Performed by: PSYCHIATRY & NEUROLOGY

## 2017-12-21 PROCEDURE — S0028 INJECTION, FAMOTIDINE, 20 MG: HCPCS | Performed by: SURGERY

## 2017-12-21 PROCEDURE — 85025 COMPLETE CBC W/AUTO DIFF WBC: CPT

## 2017-12-21 PROCEDURE — 99999 PR OFFICE/OUTPT VISIT,PROCEDURE ONLY: CPT | Performed by: SURGERY

## 2017-12-21 PROCEDURE — 99222 1ST HOSP IP/OBS MODERATE 55: CPT | Performed by: INTERNAL MEDICINE

## 2017-12-21 PROCEDURE — 6360000002 HC RX W HCPCS: Performed by: SURGERY

## 2017-12-21 PROCEDURE — 85610 PROTHROMBIN TIME: CPT

## 2017-12-21 PROCEDURE — 82948 REAGENT STRIP/BLOOD GLUCOSE: CPT

## 2017-12-21 PROCEDURE — 2700000000 HC OXYGEN THERAPY PER DAY

## 2017-12-21 PROCEDURE — 6370000000 HC RX 637 (ALT 250 FOR IP): Performed by: FAMILY MEDICINE

## 2017-12-21 PROCEDURE — 84100 ASSAY OF PHOSPHORUS: CPT

## 2017-12-21 PROCEDURE — 6360000002 HC RX W HCPCS: Performed by: PSYCHIATRY & NEUROLOGY

## 2017-12-21 PROCEDURE — 83735 ASSAY OF MAGNESIUM: CPT

## 2017-12-21 RX ORDER — METOPROLOL TARTRATE 5 MG/5ML
5 INJECTION INTRAVENOUS EVERY 6 HOURS
Status: DISCONTINUED | OUTPATIENT
Start: 2017-12-21 | End: 2017-12-22

## 2017-12-21 RX ORDER — HYDRALAZINE HYDROCHLORIDE 20 MG/ML
20 INJECTION INTRAMUSCULAR; INTRAVENOUS EVERY 6 HOURS PRN
Status: DISCONTINUED | OUTPATIENT
Start: 2017-12-21 | End: 2017-12-29 | Stop reason: HOSPADM

## 2017-12-21 RX ADMIN — HYDRALAZINE HYDROCHLORIDE 20 MG: 20 INJECTION INTRAMUSCULAR; INTRAVENOUS at 15:58

## 2017-12-21 RX ADMIN — DEXMEDETOMIDINE HYDROCHLORIDE 1 MCG/KG/HR: 4 INJECTION, SOLUTION INTRAVENOUS at 16:30

## 2017-12-21 RX ADMIN — DEXMEDETOMIDINE HYDROCHLORIDE 0.7 MCG/KG/HR: 4 INJECTION, SOLUTION INTRAVENOUS at 09:15

## 2017-12-21 RX ADMIN — METOPROLOL TARTRATE 5 MG: 5 INJECTION INTRAVENOUS at 09:17

## 2017-12-21 RX ADMIN — Medication 10 ML: at 22:17

## 2017-12-21 RX ADMIN — DEXMEDETOMIDINE HYDROCHLORIDE 1 MCG/KG/HR: 4 INJECTION, SOLUTION INTRAVENOUS at 20:07

## 2017-12-21 RX ADMIN — ZIPRASIDONE MESYLATE 10 MG: 20 INJECTION, POWDER, LYOPHILIZED, FOR SOLUTION INTRAMUSCULAR at 19:53

## 2017-12-21 RX ADMIN — HYDROMORPHONE HYDROCHLORIDE 1 MG: 1 INJECTION, SOLUTION INTRAMUSCULAR; INTRAVENOUS; SUBCUTANEOUS at 02:43

## 2017-12-21 RX ADMIN — HYDRALAZINE HYDROCHLORIDE 10 MG: 20 INJECTION INTRAMUSCULAR; INTRAVENOUS at 01:05

## 2017-12-21 RX ADMIN — DEXMEDETOMIDINE HYDROCHLORIDE 1 MCG/KG/HR: 4 INJECTION, SOLUTION INTRAVENOUS at 13:06

## 2017-12-21 RX ADMIN — METOPROLOL TARTRATE 5 MG: 5 INJECTION INTRAVENOUS at 14:04

## 2017-12-21 RX ADMIN — HYDROMORPHONE HYDROCHLORIDE 1 MG: 1 INJECTION, SOLUTION INTRAMUSCULAR; INTRAVENOUS; SUBCUTANEOUS at 09:31

## 2017-12-21 RX ADMIN — AMIODARONE HYDROCHLORIDE 200 MG: 200 TABLET ORAL at 20:14

## 2017-12-21 RX ADMIN — ZIPRASIDONE MESYLATE 10 MG: 20 INJECTION, POWDER, LYOPHILIZED, FOR SOLUTION INTRAMUSCULAR at 12:02

## 2017-12-21 RX ADMIN — FAMOTIDINE 20 MG: 10 INJECTION, SOLUTION INTRAVENOUS at 22:17

## 2017-12-21 RX ADMIN — HYDROMORPHONE HYDROCHLORIDE 1 MG: 1 INJECTION, SOLUTION INTRAMUSCULAR; INTRAVENOUS; SUBCUTANEOUS at 06:11

## 2017-12-21 RX ADMIN — SODIUM CHLORIDE: 9 INJECTION, SOLUTION INTRAVENOUS at 05:28

## 2017-12-21 RX ADMIN — HYDRALAZINE HYDROCHLORIDE 25 MG: 25 TABLET, FILM COATED ORAL at 20:14

## 2017-12-21 RX ADMIN — QUETIAPINE FUMARATE 100 MG: 100 TABLET ORAL at 19:52

## 2017-12-21 RX ADMIN — HYDRALAZINE HYDROCHLORIDE 20 MG: 20 INJECTION INTRAMUSCULAR; INTRAVENOUS at 08:47

## 2017-12-21 RX ADMIN — HYDROMORPHONE HYDROCHLORIDE 1 MG: 1 INJECTION, SOLUTION INTRAMUSCULAR; INTRAVENOUS; SUBCUTANEOUS at 14:50

## 2017-12-21 RX ADMIN — METOROPROLOL TARTRATE 5 MG: 5 INJECTION, SOLUTION INTRAVENOUS at 02:05

## 2017-12-21 RX ADMIN — HYDROMORPHONE HYDROCHLORIDE 1 MG: 1 INJECTION, SOLUTION INTRAMUSCULAR; INTRAVENOUS; SUBCUTANEOUS at 01:26

## 2017-12-21 RX ADMIN — NICARDIPINE HYDROCHLORIDE 7.5 MG/HR: 0.1 INJECTION, SOLUTION INTRAVENOUS at 22:14

## 2017-12-21 RX ADMIN — LORAZEPAM 3 MG: 2 INJECTION INTRAMUSCULAR; INTRAVENOUS at 08:53

## 2017-12-21 RX ADMIN — METOPROLOL TARTRATE 5 MG: 5 INJECTION INTRAVENOUS at 20:10

## 2017-12-21 RX ADMIN — Medication 10 ML: at 08:48

## 2017-12-21 RX ADMIN — HYDROMORPHONE HYDROCHLORIDE 1 MG: 1 INJECTION, SOLUTION INTRAMUSCULAR; INTRAVENOUS; SUBCUTANEOUS at 05:06

## 2017-12-21 RX ADMIN — DEXMEDETOMIDINE HYDROCHLORIDE 1 MCG/KG/HR: 4 INJECTION, SOLUTION INTRAVENOUS at 23:24

## 2017-12-21 RX ADMIN — FAMOTIDINE 20 MG: 10 INJECTION, SOLUTION INTRAVENOUS at 08:47

## 2017-12-21 RX ADMIN — SODIUM CHLORIDE: 9 INJECTION, SOLUTION INTRAVENOUS at 15:07

## 2017-12-21 RX ADMIN — DEXMEDETOMIDINE HYDROCHLORIDE 0.7 MCG/KG/HR: 4 INJECTION, SOLUTION INTRAVENOUS at 05:44

## 2017-12-21 RX ADMIN — INSULIN LISPRO 6 UNITS: 100 INJECTION, SOLUTION INTRAVENOUS; SUBCUTANEOUS at 09:18

## 2017-12-21 RX ADMIN — NICARDIPINE HYDROCHLORIDE 2.5 MG/HR: 0.1 INJECTION, SOLUTION INTRAVENOUS at 18:08

## 2017-12-21 ASSESSMENT — PAIN SCALES - WONG BAKER
WONGBAKER_NUMERICALRESPONSE: 8
WONGBAKER_NUMERICALRESPONSE: 6
WONGBAKER_NUMERICALRESPONSE: 8

## 2017-12-21 ASSESSMENT — PAIN SCALES - GENERAL
PAINLEVEL_OUTOF10: 4
PAINLEVEL_OUTOF10: 8
PAINLEVEL_OUTOF10: 9
PAINLEVEL_OUTOF10: 10
PAINLEVEL_OUTOF10: 9
PAINLEVEL_OUTOF10: 9
PAINLEVEL_OUTOF10: 7

## 2017-12-21 NOTE — PLAN OF CARE
Problem: Pain:  Goal: Pain level will decrease  Pain level will decrease   Outcome: Ongoing  Patient agitated in bed on and off. Pain medication given at times of agitation. complaining of back pain. Problem: Falls - Risk of  Goal: Absence of falls  Outcome: Ongoing  No falls this shift. Patient remains on falling star program with fall band on and falling star on door. Bed exit alarm on. Fall risk assessment completed. Problem: Risk for Impaired Skin Integrity  Goal: Tissue integrity - skin and mucous membranes  Structural intactness and normal physiological function of skin and  mucous membranes. Outcome: Ongoing  No skin breakdown noted to bony prominences or coccyx area. Patient able to turn self. Bruising and scratches due to agitation, thrashing around in bed, hitting side rails. Seizure pads placed. Problem: Safety:  Goal: Ability to contract for his/her safety will improve  Ability to contract for his/her safety will improve   Outcome: Ongoing  Patient unable to follow commands and remains agitated. Problem: Discharge Planning:  Goal: Participates in care planning  Participates in care planning   Outcome: Ongoing  Patient unable to follow commands. Goal: Discharged to appropriate level of care  Discharged to appropriate level of care   Outcome: Ongoing  Patient requires ICU care. Problem: Anxiety/Stress:  Goal: Level of anxiety will decrease  Level of anxiety will decrease   Outcome: Ongoing  Patient receiving sedation. Remains agitated and unable to follow commands. Problem: Restraint Use - Nonviolent/Non-Self-Destructive Behavior:  Goal: Absence of restraint indications  Absence of restraint indications   Outcome: Ongoing  Patient is at risk for pulling out lines. Unable to follow commands. Remains agitated.      Goal: Absence of restraint-related injury  Absence of restraint-related injury   Outcome: Ongoing  Patient has not had any restraint related injuries while in restraints. Skin under both wrists intact. Comments: Care plan reviewed with patient. Patient unable verbalize understanding of the plan of care and contribute to goal setting.

## 2017-12-21 NOTE — PROGRESS NOTES
[facility]  PHYSICAL THERAPY MISSED TREATMENT NOTE  ACUTE CARE    Date: 2017  Patient Name: Leon Lindo        MRN: [id number]   : 1965  (46 y.o.)  Gender: male   Referring Practitioner: Dr. Anastasiia Harman MD  Referring Practitioner: Krys Escobar MD  Diagnosis: Intractable Back Pain  Diagnosis: Intractable back pain         REASON FOR MISSED TREATMENT:  Pt cont to be aggitated and on hold for therapy, will check back again tomorrow with nursing. Jammie Riedel

## 2017-12-21 NOTE — PROGRESS NOTES
2010 Patient extremely agitated. Thrashing body around in bed and removing gown and attempting to pull IV. Inappropriate speech and confused. Geodon and Dilaudid given per order. 2230 Patient continues to be extremely agitated. 2250 patient remains agitated, Haldol given per order. 0045 Patient bradycardic, in mid 40's-50's bpm. SBP into 200's. Patient Diaphoretic and cool. Patient remains very agitated. [address] Anjel Lozano returned called. Updated on patient. Orders to follow. 0145 Dr. Claritza Harvey in to see patient per Erwin Situ. Updated on patient. Okay to go up on Precedex for sedation, patient has back up rate set at 40 bpm. Give metoprolol to help with blood pressure. 7571 Patient extremely agitated. Thrashing around in bed, hitting arms and legs on side rails. x4 staff to restrain. Orders for restraints bilateral wrists. Patient precedex increased, pain medication given. 0600 Patient in and out of agitation. 1163 Dr. Nenita Julio rounding on patient. Updated on patient condition. Blood pressure continued to rise. Wants cardiology consulted.

## 2017-12-21 NOTE — CONSULTS
[facility] [address] [place] [address]  Cardiology Consult      Patient:  Fernando April  YOB: 1965    MRN: [id number]   Acct: [de-identified]     Primary Care Physician: Karishma White NP        REASON FOR CONSULT: ECG changes,troponin,BNP  Cardiologist : Dr. Lamonte Kenny:  Back Pain        All labs, EKG's, diagnostic testing and images as well as cardiac cath, stress testing   were reviewed during this encounter                      PREVIOUS CARDIAC TESTING    Ekg:     Echo: 6/2017          Str.  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    Atrial fibrillation ([address]     CAD (coronary artery disease) 3/25/2014    s/p CABG in may 2014    Cardiomyopathy ([address]     Depression     Diabetes mellitus ([address]     Drug abuse     hx of cacaine abuse, stopped abusing drugs in 2012    H/O cardiac catheterization 4/30/2014    Hyperlipidemia     Hypertension     Paroxysmal atrial fibrillation ([address] 7/22/2014    [facility]     V-tach [facility])     s/p ablation in dec 2014       Past Surgical History:    Past Surgical History:   Procedure Laterality Date    CARDIAC CATHETERIZATION  3/20/14     [facility]    CARDIAC DEFIBRILLATOR PLACEMENT  10/13/15    CORONARY ARTERY BYPASS GRAFT  5-9-14    3 bypass    EKG 12-LEAD  7/17/2015         OTHER SURGICAL HISTORY Left 10/21/14    Left Bursectomy, I & D Left Elbow - Dr. Ary Smith harvest from legs       Medications Prior to Admission:    Prescriptions Prior to Admission: simvastatin (ZOCOR) 20 MG tablet, Take 1 tablet by mouth nightly  tamsulosin (FLOMAX) 0.4 MG capsule, Take 1 capsule by mouth daily  baclofen (LIORESAL) 20 MG tablet, Take 1 tablet by mouth 3 times daily  traMADol (ULTRAM) 50 MG tablet, Take 1 tablet by mouth every 6 hours as needed for Pain . sacubitril-valsartan (ENTRESTO) 49-51 MG per tablet, Take 1 tablet by mouth 2 times daily  ALPRAZolam (XANAX) 0.5 MG tablet, TAKE 1 TABLET BY MOUTH NIGHTLY AS NEEDED FOR ANXIETY  warfarin (COUMADIN) 5 MG tablet, Take as directed by [facility]. #45 tablets for 30 days. amiodarone (CORDARONE) 200 MG tablet, TAKE 1 TABLET BY MOUTH TWICE DAILY  mexiletine (MEXITIL) 150 MG capsule, TAKE 2 CAPSULES THREE TIMES A DAY FOR ATRIAL FIBRILLATION  aspirin 325 MG EC tablet, TAKE 1 TABLET DAILY FOR TREATMENT TO PREVENT BLOOD CLOTTING. TAKE WITH FOOD  furosemide (LASIX) 40 MG tablet, TAKE 1 TABLET DAILY FOR TREATMENT WITH DIURETIC THERAPY  metoprolol tartrate (LOPRESSOR) 50 MG tablet, TAKE 1 TABLET THREE TIMES A DAY FOR ATRIAL FIBRILLATION  metFORMIN (GLUCOPHAGE) 1000 MG tablet, TAKE 1 TABLET TWICE A DAY WITH MEALS FOR DIABETES  linagliptin (TRADJENTA) 5 MG tablet, Take 1 tablet by mouth daily  potassium chloride (KLOR-CON M) 20 MEQ extended release tablet, TAKE 1 TABLET TWICE A DAY  finasteride (PROSCAR) 5 MG tablet, TK 1 T PO QD  glucose blood VI test strips (PHIL CONTOUR TEST) strip, 1 each by In Vitro route daily  acetaminophen 650 MG TABS, Take 650 mg by mouth every 4 hours as needed    Allergies:    Pcn [penicillins] and Zoloft [sertraline hcl]    Social History:    reports that he has been smoking Cigarettes. He started smoking about 37 years ago. He has a 32.00 pack-year smoking history. He has quit using smokeless tobacco. He reports that he does not drink alcohol or use drugs. Family History:   family history includes Diabetes in his father, maternal grandfather, maternal grandmother, and mother; Heart Disease in his father, paternal grandfather, and sister; High Blood Pressure in his father and mother; Stroke in his mother.     REVIEW OF SYSTEMS:    Constitutional: negative for anorexia, chills and fevers,weight change  Respiratory: 12/20/17 1403 (!) 179/77 98.4 °F (36.9 °C) CORE 64 19 91 % -   12/20/17 1324 (!) 189/79 98.8 °F (37.1 °C) CORE 61 20 91 % -   12/20/17 1314 (!) 196/89 - - - - - -   12/20/17 1246 - - - 79 17 - -   12/20/17 1130 (!) 164/77 96.7 °F (35.9 °C) Axillary 70 18 97 % -       Last 3 weights: Wt Readings from Last 3 Encounters:   12/21/17 264 lb 8.8 oz (120 kg)   12/14/17 279 lb (126.6 kg)   12/12/17 250 lb (113.4 kg)     24 hour intake/output:  Intake/Output Summary (Last 24 hours) at 12/21/17 1052  Last data filed at 12/21/17 0400   Gross per 24 hour   Intake           1056.3 ml   Output             2075 ml   Net          -1018.7 ml     BMI:Body mass index is 33.97 kg/m². General Appearance: unable to arouse, in no acute distress  Skin: warm and dry, no rash or erythema    Neck: supple and non-tender without mass, no thyromegaly or thyroid nodules, no cervical lymphadenopathy  Pulmonary/Chest: clear to auscultation bilaterally- no wheezes, rales or rhonchi, normal air movement, no respiratory distress  Cardiovascular: normal rate, regular rhythm, normal S1 and S2, no murmurs, rubs, clicks, or gallops, distal pulses intact, no carotid bruits, Negative JVD  Radial Pulses: intact 2+  Abdomen: soft, non-tender, non-distended, normal bowel sounds, no masses or organomegaly  Extremities: no cyanosis, clubbing .  Edema  Musculoskeletal: normal range of motion, no joint swelling, deformity or tenderness    LABS:  Recent Labs      12/19/17   0835  12/20/17   0338  12/21/17   0643   CKTOTAL  579*  1,094*  339*     CBC: Lab Results   Component Value Date    WBC 12.5 12/21/2017    RBC 4.60 12/21/2017    RBC 4.75 11/03/2011    HGB 15.5 12/21/2017    HCT 45.5 12/21/2017    MCV 99.1 12/21/2017    MCH 33.7 12/21/2017    MCHC 34.0 12/21/2017    RDW 14.4 12/21/2017     12/21/2017    MPV 8.9 12/21/2017     BMP:  Lab Results   Component Value Date     12/21/2017    K 3.6 12/21/2017     12/21/2017    CO2 25 12/21/2017

## 2017-12-21 NOTE — PROGRESS NOTES
°F (37.9 °C)    CURRENT RESPIRATORY RATE:  Resp: 16  CURRENT PULSE:  Pulse: 76  CURRENT BLOOD PRESSURE:  BP: (!) 218/107    CURRENT PULSE OXIMETRY:  SpO2: 92 %  24HR INTAKE/OUTPUT:    Intake/Output Summary (Last 24 hours) at 12/21/17 1113  Last data filed at 12/21/17 1100   Gross per 24 hour   Intake           1056.3 ml   Output             2075 ml   Net          -1018.7 ml     CONSTITUTIONAL:  Awakes and following commands. Agitated. Moves all 4. On Precedex. BACK:  No pressure ulcers  LUNGS:  No increased work of breathing, good air exchange, clear to auscultation bilaterally, no crackles or wheezing  CARDIOVASCULAR:  irregular rate and rhythm, no murmur noted  ABDOMEN:  soft, non-distended, non-tender, no masses palpated  GENITAL/URINARY:  Mason in place  MUSCULOSKELETAL:  There is no redness, warmth, or swelling of the joints. Full range of motion noted. Motor strength is 5 out of 5 all extremities bilaterally. Tone is normal.  NEUROLOGIC:  On Precedex, wakes up easy and follows commands. Confused and agitated intermittently. Move all 4. SKIN:  No bruising or bleeding. DATA:  CBC:   Lab Results   Component Value Date    WBC 12.5 12/21/2017    RBC 4.60 12/21/2017    RBC 4.75 11/03/2011    HGB 15.5 12/21/2017    HCT 45.5 12/21/2017    MCV 99.1 12/21/2017    RDW 14.4 12/21/2017     12/21/2017     BMP:    Lab Results   Component Value Date     12/21/2017    K 3.6 12/21/2017     12/21/2017    CO2 25 12/21/2017    BUN 24 12/21/2017    CREATININE 1.2 12/21/2017    CALCIUM 8.5 12/21/2017    LABGLOM 63 12/21/2017    GLUCOSE 201 12/21/2017     Magnesium:    Lab Results   Component Value Date    MG 2.1 12/21/2017     Phosphorus:    Lab Results   Component Value Date    PHOS 3.0 12/21/2017         KEY ISSUES/FINDINGS/ASSESSMENT AND PLAN:    ASSESSMENT AND PLAN:     1. Encephalopathy and Agitational state (Multifactorial, likely with ETOH withdrawal Syndrome)  2.  Chronic Severe Intractable Back

## 2017-12-21 NOTE — CARE COORDINATION
12/20/17, 10:57 AM  Janeice Riedel day: 3  Location: [facility]23/023-A Reason for admit: Intractable back pain [M54.9] s/p a fall prior to arrival at some point  Clinical update: Pt with sitter d/t agitation and aggressiveness. CIWA scale continues. Pt developed bruises since yesterday d/t his thrashing around. MRI shows spinal cord Schwannoma lesion, neurosurgery consulted. PT/OT unable to work with pt d/t pt combativeness. Physiatry following. Discharge plan: Pt from home alone, has a daughter and siblings in the area. Discharge plan depends on clinical course. We will follow. Orders today to transfer to ICU due to his delirium, hx of substance abuse and ETOH abuse, requiring large amounts of Ativan, Precedex gtt ordered; updated Anne CM on this patient.
12/21/17, 3:10 PM  Deidre Hernandez day: 4  Location: -04/004-A Reason for admit: Intractable back pain [M54.9]   Clinical update:   12/19 MRI WO showed spinal cord Schwannoma lesion. 12/20 Mason inserted with 1000 cc returned. Pt continues on Precedex gtt, /107 and 174/83, HR 42-76, temp 100.2 this morning. [email or web address]. Pt continues to be restless, confused, combative, and in restraints. MRI W/WO contrast ordered but pt too restless to be completed. Spoke with Dr. Nola Atkinson regarding plan of care. CT chest requested. Psych, pain management and Cardiology consulted. Psych and cardiology already following. Pain management is not here this week - informed Dr. Nola Atkinson of this. Discharge plan: Pt from home alone. Discharge plan depends on clinical course.
Day Lee NP  Readmission: no  Risk Score: 18.5     Discharge Planning  Current Residence:  Private Residence, Other (Comment) (Condo )  Living Arrangements:  Alone   Support Systems:  Children, Family Members  Current Services PTA:     Potential Assistance Needed:  N/A  Potential Assistance Purchasing Medications:  No  Does patient want to participate in local refill/ meds to beds program?  No  Type of Home Care Services:  None  Patient expects to be discharged to:  Home alone   Expected Discharge date:  12/19/17  Follow Up Appointment: Best Day/ Time: Monday AM    Discharge Plan: Pt from home alone. Pt states he has two steps to enter. He has siblings that live in the area. Pt lethargic, not consistent in answering questions. States he would be open to rehab but then states he will be fine at home with his sister's help. Will follow the patient.  Evaluation: not at this time.

## 2017-12-22 ENCOUNTER — APPOINTMENT (OUTPATIENT)
Dept: MRI IMAGING | Age: 52
DRG: 551 | End: 2017-12-22
Payer: COMMERCIAL

## 2017-12-22 ENCOUNTER — APPOINTMENT (OUTPATIENT)
Dept: GENERAL RADIOLOGY | Age: 52
DRG: 551 | End: 2017-12-22
Payer: COMMERCIAL

## 2017-12-22 LAB
ANION GAP SERPL CALCULATED.3IONS-SCNC: 14 MEQ/L (ref 8–16)
BUN BLDV-MCNC: 24 MG/DL (ref 7–22)
CALCIUM SERPL-MCNC: 8.3 MG/DL (ref 8.5–10.5)
CHLORIDE BLD-SCNC: 113 MEQ/L (ref 98–111)
CO2: 20 MEQ/L (ref 23–33)
CREAT SERPL-MCNC: 1.2 MG/DL (ref 0.4–1.2)
EKG ATRIAL RATE: 49 BPM
EKG ATRIAL RATE: 53 BPM
EKG P AXIS: 37 DEGREES
EKG P AXIS: 48 DEGREES
EKG P-R INTERVAL: 160 MS
EKG P-R INTERVAL: 168 MS
EKG Q-T INTERVAL: 498 MS
EKG Q-T INTERVAL: 582 MS
EKG QRS DURATION: 106 MS
EKG QRS DURATION: 96 MS
EKG QTC CALCULATION (BAZETT): 467 MS
EKG QTC CALCULATION (BAZETT): 525 MS
EKG R AXIS: 17 DEGREES
EKG R AXIS: 6 DEGREES
EKG T AXIS: 165 DEGREES
EKG T AXIS: 166 DEGREES
EKG VENTRICULAR RATE: 49 BPM
EKG VENTRICULAR RATE: 53 BPM
GFR SERPL CREATININE-BSD FRML MDRD: 63 ML/MIN/1.73M2
GLUCOSE BLD-MCNC: 168 MG/DL (ref 70–108)
GLUCOSE BLD-MCNC: 171 MG/DL (ref 70–108)
GLUCOSE BLD-MCNC: 179 MG/DL (ref 70–108)
GLUCOSE BLD-MCNC: 201 MG/DL (ref 70–108)
GLUCOSE BLD-MCNC: 202 MG/DL (ref 70–108)
GLUCOSE BLD-MCNC: 209 MG/DL (ref 70–108)
HCT VFR BLD CALC: 46 % (ref 42–52)
HEMOGLOBIN: 15.3 GM/DL (ref 14–18)
INR BLD: 4.56 (ref 0.85–1.13)
MAGNESIUM: 2.1 MG/DL (ref 1.6–2.4)
MCH RBC QN AUTO: 32.5 PG (ref 27–31)
MCHC RBC AUTO-ENTMCNC: 33.3 GM/DL (ref 33–37)
MCV RBC AUTO: 97.6 FL (ref 80–94)
MRSA SCREEN: NORMAL
PDW BLD-RTO: 15 % (ref 11.5–14.5)
PHOSPHORUS: 2.6 MG/DL (ref 2.4–4.7)
PLATELET # BLD: 142 THOU/MM3 (ref 130–400)
PMV BLD AUTO: 9.3 MCM (ref 7.4–10.4)
POTASSIUM SERPL-SCNC: 3.2 MEQ/L (ref 3.5–5.2)
RBC # BLD: 4.71 MILL/MM3 (ref 4.7–6.1)
SODIUM BLD-SCNC: 147 MEQ/L (ref 135–145)
TOTAL CK: 246 U/L (ref 55–170)
VRE CULTURE: NORMAL
WBC # BLD: 14.5 THOU/MM3 (ref 4.8–10.8)

## 2017-12-22 PROCEDURE — 2500000003 HC RX 250 WO HCPCS: Performed by: INTERNAL MEDICINE

## 2017-12-22 PROCEDURE — 36415 COLL VENOUS BLD VENIPUNCTURE: CPT

## 2017-12-22 PROCEDURE — 82948 REAGENT STRIP/BLOOD GLUCOSE: CPT

## 2017-12-22 PROCEDURE — 6370000000 HC RX 637 (ALT 250 FOR IP): Performed by: PSYCHIATRY & NEUROLOGY

## 2017-12-22 PROCEDURE — 99233 SBSQ HOSP IP/OBS HIGH 50: CPT | Performed by: NURSE PRACTITIONER

## 2017-12-22 PROCEDURE — 6370000000 HC RX 637 (ALT 250 FOR IP): Performed by: INTERNAL MEDICINE

## 2017-12-22 PROCEDURE — 71010 XR CHEST PORTABLE: CPT

## 2017-12-22 PROCEDURE — 2580000003 HC RX 258: Performed by: INTERNAL MEDICINE

## 2017-12-22 PROCEDURE — 99233 SBSQ HOSP IP/OBS HIGH 50: CPT | Performed by: FAMILY MEDICINE

## 2017-12-22 PROCEDURE — 2580000003 HC RX 258: Performed by: SURGERY

## 2017-12-22 PROCEDURE — 72158 MRI LUMBAR SPINE W/O & W/DYE: CPT

## 2017-12-22 PROCEDURE — S0028 INJECTION, FAMOTIDINE, 20 MG: HCPCS | Performed by: SURGERY

## 2017-12-22 PROCEDURE — 84100 ASSAY OF PHOSPHORUS: CPT

## 2017-12-22 PROCEDURE — 6360000002 HC RX W HCPCS: Performed by: SURGERY

## 2017-12-22 PROCEDURE — 6360000004 HC RX CONTRAST MEDICATION: Performed by: NEUROLOGICAL SURGERY

## 2017-12-22 PROCEDURE — 6370000000 HC RX 637 (ALT 250 FOR IP): Performed by: NURSE PRACTITIONER

## 2017-12-22 PROCEDURE — A9579 GAD-BASE MR CONTRAST NOS,1ML: HCPCS | Performed by: NEUROLOGICAL SURGERY

## 2017-12-22 PROCEDURE — 2500000003 HC RX 250 WO HCPCS: Performed by: SURGERY

## 2017-12-22 PROCEDURE — 6370000000 HC RX 637 (ALT 250 FOR IP): Performed by: SURGERY

## 2017-12-22 PROCEDURE — 80048 BASIC METABOLIC PNL TOTAL CA: CPT

## 2017-12-22 PROCEDURE — 6370000000 HC RX 637 (ALT 250 FOR IP): Performed by: FAMILY MEDICINE

## 2017-12-22 PROCEDURE — 6360000002 HC RX W HCPCS: Performed by: INTERNAL MEDICINE

## 2017-12-22 PROCEDURE — 85027 COMPLETE CBC AUTOMATED: CPT

## 2017-12-22 PROCEDURE — 93005 ELECTROCARDIOGRAM TRACING: CPT

## 2017-12-22 PROCEDURE — 2100000000 HC CCU R&B

## 2017-12-22 PROCEDURE — 83735 ASSAY OF MAGNESIUM: CPT

## 2017-12-22 PROCEDURE — 82550 ASSAY OF CK (CPK): CPT

## 2017-12-22 PROCEDURE — 85610 PROTHROMBIN TIME: CPT

## 2017-12-22 RX ORDER — FUROSEMIDE 40 MG/1
40 TABLET ORAL DAILY
Status: DISCONTINUED | OUTPATIENT
Start: 2017-12-22 | End: 2017-12-29 | Stop reason: HOSPADM

## 2017-12-22 RX ORDER — METOPROLOL TARTRATE 50 MG/1
50 TABLET, FILM COATED ORAL 3 TIMES DAILY
Status: DISCONTINUED | OUTPATIENT
Start: 2017-12-22 | End: 2017-12-22

## 2017-12-22 RX ORDER — POTASSIUM CHLORIDE 750 MG/1
40 TABLET, FILM COATED, EXTENDED RELEASE ORAL ONCE
Status: COMPLETED | OUTPATIENT
Start: 2017-12-22 | End: 2017-12-22

## 2017-12-22 RX ORDER — METOPROLOL TARTRATE 50 MG/1
50 TABLET, FILM COATED ORAL 3 TIMES DAILY
Status: DISCONTINUED | OUTPATIENT
Start: 2017-12-22 | End: 2017-12-29 | Stop reason: HOSPADM

## 2017-12-22 RX ORDER — AMIODARONE HYDROCHLORIDE 200 MG/1
200 TABLET ORAL DAILY
Status: DISCONTINUED | OUTPATIENT
Start: 2017-12-23 | End: 2017-12-29 | Stop reason: HOSPADM

## 2017-12-22 RX ADMIN — MEXILETINE HYDROCHLORIDE 300 MG: 150 CAPSULE ORAL at 13:55

## 2017-12-22 RX ADMIN — HYDROCODONE BITARTRATE AND ACETAMINOPHEN 1 TABLET: 7.5; 325 TABLET ORAL at 16:55

## 2017-12-22 RX ADMIN — METOPROLOL TARTRATE 5 MG: 5 INJECTION INTRAVENOUS at 02:58

## 2017-12-22 RX ADMIN — QUETIAPINE FUMARATE 100 MG: 100 TABLET ORAL at 13:55

## 2017-12-22 RX ADMIN — FUROSEMIDE 40 MG: 40 TABLET ORAL at 10:49

## 2017-12-22 RX ADMIN — FAMOTIDINE 20 MG: 10 INJECTION, SOLUTION INTRAVENOUS at 08:20

## 2017-12-22 RX ADMIN — POTASSIUM CHLORIDE 40 MEQ: 750 TABLET, FILM COATED, EXTENDED RELEASE ORAL at 08:08

## 2017-12-22 RX ADMIN — SODIUM CHLORIDE: 9 INJECTION, SOLUTION INTRAVENOUS at 01:21

## 2017-12-22 RX ADMIN — METOPROLOL TARTRATE 50 MG: 50 TABLET, FILM COATED ORAL at 21:44

## 2017-12-22 RX ADMIN — MEXILETINE HYDROCHLORIDE 300 MG: 150 CAPSULE ORAL at 23:11

## 2017-12-22 RX ADMIN — GADOTERIDOL 20 ML: 279.3 INJECTION, SOLUTION INTRAVENOUS at 13:00

## 2017-12-22 RX ADMIN — FAMOTIDINE 20 MG: 10 INJECTION, SOLUTION INTRAVENOUS at 21:45

## 2017-12-22 RX ADMIN — NICARDIPINE HYDROCHLORIDE 7.5 MG/HR: 0.1 INJECTION, SOLUTION INTRAVENOUS at 03:40

## 2017-12-22 RX ADMIN — HYDROMORPHONE HYDROCHLORIDE 1 MG: 1 INJECTION, SOLUTION INTRAMUSCULAR; INTRAVENOUS; SUBCUTANEOUS at 02:53

## 2017-12-22 RX ADMIN — AMIODARONE HYDROCHLORIDE 200 MG: 200 TABLET ORAL at 07:52

## 2017-12-22 RX ADMIN — MEXILETINE HYDROCHLORIDE 300 MG: 150 CAPSULE ORAL at 07:51

## 2017-12-22 RX ADMIN — QUETIAPINE FUMARATE 100 MG: 100 TABLET ORAL at 21:43

## 2017-12-22 RX ADMIN — HALOPERIDOL LACTATE 1 MG: 5 INJECTION, SOLUTION INTRAMUSCULAR at 01:44

## 2017-12-22 RX ADMIN — METOPROLOL TARTRATE 50 MG: 50 TABLET, FILM COATED ORAL at 13:55

## 2017-12-22 RX ADMIN — METOPROLOL TARTRATE 50 MG: 50 TABLET, FILM COATED ORAL at 10:49

## 2017-12-22 RX ADMIN — ACETAMINOPHEN 650 MG: 325 TABLET ORAL at 21:44

## 2017-12-22 RX ADMIN — ALPRAZOLAM 0.25 MG: 0.25 TABLET ORAL at 10:51

## 2017-12-22 RX ADMIN — Medication 2 UNITS: at 23:12

## 2017-12-22 RX ADMIN — FINASTERIDE 5 MG: 5 TABLET, FILM COATED ORAL at 07:52

## 2017-12-22 RX ADMIN — QUETIAPINE FUMARATE 100 MG: 100 TABLET ORAL at 07:51

## 2017-12-22 RX ADMIN — TAMSULOSIN HYDROCHLORIDE 0.4 MG: 0.4 CAPSULE ORAL at 07:51

## 2017-12-22 RX ADMIN — HYDRALAZINE HYDROCHLORIDE 25 MG: 25 TABLET, FILM COATED ORAL at 07:52

## 2017-12-22 RX ADMIN — Medication 10 ML: at 21:49

## 2017-12-22 RX ADMIN — METOPROLOL TARTRATE 5 MG: 5 INJECTION INTRAVENOUS at 08:12

## 2017-12-22 RX ADMIN — Medication 10 ML: at 07:53

## 2017-12-22 RX ADMIN — NICARDIPINE HYDROCHLORIDE 7.5 MG/HR: 0.1 INJECTION, SOLUTION INTRAVENOUS at 00:53

## 2017-12-22 RX ADMIN — HYDROMORPHONE HYDROCHLORIDE 1 MG: 1 INJECTION, SOLUTION INTRAMUSCULAR; INTRAVENOUS; SUBCUTANEOUS at 11:50

## 2017-12-22 RX ADMIN — DEXMEDETOMIDINE HYDROCHLORIDE 1 MCG/KG/HR: 4 INJECTION, SOLUTION INTRAVENOUS at 02:46

## 2017-12-22 RX ADMIN — THIAMINE HCL (VITAMIN B1) 50 MG TABLET 100 MG: at 07:52

## 2017-12-22 RX ADMIN — NICARDIPINE HYDROCHLORIDE 7.5 MG/HR: 0.1 INJECTION, SOLUTION INTRAVENOUS at 06:25

## 2017-12-22 RX ADMIN — INSULIN LISPRO 3 UNITS: 100 INJECTION, SOLUTION INTRAVENOUS; SUBCUTANEOUS at 13:57

## 2017-12-22 RX ADMIN — LINAGLIPTIN 5 MG: 5 TABLET, FILM COATED ORAL at 07:51

## 2017-12-22 RX ADMIN — INSULIN LISPRO 3 UNITS: 100 INJECTION, SOLUTION INTRAVENOUS; SUBCUTANEOUS at 08:12

## 2017-12-22 RX ADMIN — INSULIN LISPRO 6 UNITS: 100 INJECTION, SOLUTION INTRAVENOUS; SUBCUTANEOUS at 17:01

## 2017-12-22 ASSESSMENT — PAIN DESCRIPTION - PAIN TYPE
TYPE: CHRONIC PAIN
TYPE: ACUTE PAIN;CHRONIC PAIN
TYPE: ACUTE PAIN;CHRONIC PAIN

## 2017-12-22 ASSESSMENT — PAIN DESCRIPTION - ORIENTATION
ORIENTATION: LOWER;MID
ORIENTATION: LOWER;MID

## 2017-12-22 ASSESSMENT — PAIN SCALES - GENERAL
PAINLEVEL_OUTOF10: 0
PAINLEVEL_OUTOF10: 7
PAINLEVEL_OUTOF10: 0
PAINLEVEL_OUTOF10: 8
PAINLEVEL_OUTOF10: 10
PAINLEVEL_OUTOF10: 4
PAINLEVEL_OUTOF10: 0
PAINLEVEL_OUTOF10: 0
PAINLEVEL_OUTOF10: 6

## 2017-12-22 ASSESSMENT — PAIN DESCRIPTION - DESCRIPTORS
DESCRIPTORS: ACHING;DISCOMFORT;PENETRATING
DESCRIPTORS: ACHING;DISCOMFORT;PENETRATING

## 2017-12-22 ASSESSMENT — PAIN DESCRIPTION - LOCATION
LOCATION: BACK

## 2017-12-22 NOTE — PROGRESS NOTES
proscar  23. Deconditioning -- PT/OT - PMR was consulted 12/20 and unable to eval due to #1 -- cont monitor    dispo  -- 12/22 --> pt mentally doing little better this am - less agitation - cont monitor closely;  WBC and temp up little - ?developing infxn vs ?atelectasis -- apprec cardio assist and resumed home meds - BB, entresto, lasix -- repeat MRI lumbar spine (p) and await further NS recs. Cont monitor lytes, renal fxn, BP, BS, CBC, temps closely, and mental status. Cont CCU care today. Chief Complaint: back pain    Hospital Course:  Dashawn Reina is a 46 y.o. male with CHF, CAD with hx CABG, CM with ICD, HTN, HLP, DM2, AFib, hx VT with ablation 2014 presented with back pain. On 12/20 pt had increasing agitation, confusion -psychiatry was consulted -- concern for withdrawal and significant amt of ativan was given without improvement in mental status thus was transferred to ICU - Dr. Carole Palma managed pt while in ICU and was placed on precedex gtt, cardene gtt for HTN -- transferred to CCU 12/21 for need for ICU beds. --  CT Head that showed NAP(12/17/17), CT Lumbar (12/12/17) showed Rt L5 pars defect without spondylolisthesis, mild annular bulges from L2-3 through L5-S1 with mild to moderate spinal stenosis, and possible impingement upon the Rt L2 nerve root at  L2-3. 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 Repeat MRI lumbar spine (p) 12/22/17  -- cardiology consulted for EKG changes, elevated troponin, significant cardiac hx. Subjective:   -- 12/22/2017  --> pt sleepy but arousable - oriented to person, place. Per RN pt was yelling out this am but she was able to calm him and he then became more appropriate. Ate some this am.  Denies cp, sob. Denies abd pain, n/v.  French po. On RA. Last BM ? ? - none documented since admission      Medications:  Reviewed    Infusion Medications    dexmedetomidine HCl in NaCl 0.6 mcg/kg/hr (12/22/17 0918)    sodium chloride 100 mL/hr at 12/22/17 0121    dextrose 5 % and 0.9 % NaCl      niCARdipine in sodium chloride 2 mg/hr (12/22/17 0920)     Scheduled Medications    [START ON 12/23/2017] amiodarone  200 mg Oral Daily    furosemide  40 mg Oral Daily    sacubitril-valsartan  1 tablet Oral BID    metoprolol tartrate  50 mg Oral TID    QUEtiapine  100 mg Oral TID    sodium chloride flush  10 mL Intravenous 2 times per day    famotidine (PEPCID) injection  20 mg Intravenous BID    vitamin B-1  100 mg Oral Daily    ALPRAZolam  0.5 mg Oral Once    ALPRAZolam  0.5 mg Oral Once    insulin lispro  0-18 Units Subcutaneous TID WC    insulin lispro  0-9 Units Subcutaneous Nightly    lidocaine  3 patch Transdermal Daily    nicotine  1 patch Transdermal Daily    linagliptin  5 mg Oral Daily    LORazepam  1 mg Intravenous Once    finasteride  5 mg Oral Daily    mexiletine  300 mg Oral 3 times per day    potassium chloride  20 mEq Oral Once    tamsulosin  0.4 mg Oral Daily     PRN Meds: hydrALAZINE, sodium chloride flush, acetaminophen, ondansetron, HYDROmorphone, metoprolol, LORazepam **OR** LORazepam **OR** LORazepam **OR** LORazepam **OR** LORazepam **OR** LORazepam **OR** LORazepam **OR** LORazepam, glucose, dextrose, glucagon (rDNA), dextrose 5 % and 0.9 % NaCl, ALPRAZolam, HYDROcodone-acetaminophen, nicotine, magnesium hydroxide      Intake/Output Summary (Last 24 hours) at 12/22/17 1657  Last data filed at 12/22/17 1620   Gross per 24 hour   Intake          5878.11 ml   Output             1250 ml   Net          4628.11 ml       Diet:  DIET CARDIAC; Carb Control: 4 carbs/meal (approximate 1800 kcals/day)    Exam:  /63   Pulse 59   Temp 100.4 °F (38 °C) (Core)   Resp 24   Wt 264 lb 8.8 oz (120 kg)   SpO2 97%   BMI 33.97 kg/m²   RA    General appearance: sleepy but arousable, oriented x 2, states seeing bugs  HEENT: Pupils equal, round, and reactive to light. Conjunctivae/corneas clear. Neck: Supple, with full range of motion. No jugular venous distention. Trachea midline. Respiratory:  Normal respiratory effort. Clear to auscultation, bilaterally without Rales/Wheezes/Rhonchi. No respiratory distress or accessory muscle use. Cardiovascular: nicki but regular, 2/6 JOELLE, No rubs or gallops. Abdomen: Soft, non-tender, non-distended with normal bowel sounds. No rebound or guarding  Musculoskeletal: No clubbing, cyanosis or edema bilaterally. Full range of motion without deformity. No calf tenderness palpation  Skin: Skin color, texture, turgor normal.  No rashes or lesions. Neurologic:  Neurovascularly intact without any focal sensory/motor deficits. Cranial nerves: II-XII intact, grossly non-focal.  Psychiatric: seeing bugs but oriented x person, place.   Capillary Refill: Brisk,< 3 seconds   Peripheral Pulses: +2 palpable, equal bilaterally       Labs:   Recent Labs      12/21/17 0643  12/22/17   0350   WBC  12.5*  14.5*   HGB  15.5  15.3   HCT  45.5  46.0   PLT  143  142     Recent Labs      12/20/17   0338  12/21/17   0643  12/22/17   0350   NA  143  144  147*   K  3.7  3.6  3.2*   CL  106  109  113*   CO2  23  25  20*   BUN  31*  24*  24*   CREATININE  1.3*  1.2  1.2   CALCIUM  9.0  8.5  8.3*   PHOS   --   3.0  2.6     Recent Labs      12/20/17   0338   AST  41*  38   ALT  26  25   BILIDIR  <0.2   BILITOT  0.7  0.7   ALKPHOS  77  78     Recent Labs      12/20/17   0338  12/21/17   0643  12/22/17   0350   INR  4.66*  5.09*  4.56*     Recent Labs      12/20/17   0338  12/21/17   0643  12/22/17   0350   CKTOTAL  1,094*  339*  246*       Urinalysis:    Lab Results   Component Value Date    NITRU NEGATIVE 12/20/2017    WBCUA 0-2 12/20/2017    BACTERIA FEW 12/20/2017    RBCUA 10-15 12/20/2017    BLOODU MODERATE 12/20/2017    SPECGRAV >1.030 12/20/2017 CARDIAC; Carb Control: 4 carbs/meal (approximate 1800 kcals/day)    Mason: yes    Microbiology:  Urine cx 12/20 = mixed growth    HIV 12/20 (-)    MRSA (-)    Tele:  SR/SB    DVT prophylaxis: [] Lovenox                                 [x] SCDs                                 [] SQ Heparin                                 [] Encourage ambulation           [] Already on Anticoagulation     Disposition:    [] Home       [] TCU       [] Rehab       [] Psych       [] SNF       [] [facility]       [x] Other- ??     Code Status: Full Code    PT/OT Eval Status: consulted      Electronically signed by Garry Cadena MD on 12/22/2017 at 8:57AM

## 2017-12-22 NOTE — PROGRESS NOTES
Physical Therapy  [facility]  PHYSICAL THERAPY MISSED TREATMENT NOTE  ACUTE CARE    Date: 2017  Patient Name: Kirsten Esquivel        MRN: [id number]   : 1965  (46 y.o.)  Gender: male   Referring Practitioner: Dr. Hesham Adame MD  Referring Practitioner: Kate Yates MD  Diagnosis: Intractable Back Pain  Diagnosis: Intractable back pain         REASON FOR MISSED TREATMENT:  Pt attempted mult times this date. First attempt pt to groggy to participate, second attempt pt off floor for MRI, third attempt pt still off floor. Therapist waited and pt did not return while waiting. Will resume when appropriate.            Ryann Winter PTA [id number]

## 2017-12-23 ENCOUNTER — APPOINTMENT (OUTPATIENT)
Dept: CT IMAGING | Age: 52
DRG: 551 | End: 2017-12-23
Payer: COMMERCIAL

## 2017-12-23 LAB
ALBUMIN SERPL-MCNC: 2.1 G/DL (ref 3.5–5.1)
ALP BLD-CCNC: 73 U/L (ref 38–126)
ALT SERPL-CCNC: 19 U/L (ref 11–66)
ANION GAP SERPL CALCULATED.3IONS-SCNC: 11 MEQ/L (ref 8–16)
AST SERPL-CCNC: 21 U/L (ref 5–40)
BASOPHILS # BLD: 0.3 %
BASOPHILS ABSOLUTE: 0 THOU/MM3 (ref 0–0.1)
BILIRUB SERPL-MCNC: 0.8 MG/DL (ref 0.3–1.2)
BUN BLDV-MCNC: 25 MG/DL (ref 7–22)
CALCIUM SERPL-MCNC: 8 MG/DL (ref 8.5–10.5)
CEA: 9.8 NG/ML (ref 0–5)
CHLORIDE BLD-SCNC: 107 MEQ/L (ref 98–111)
CO2: 23 MEQ/L (ref 23–33)
CREAT SERPL-MCNC: 1.4 MG/DL (ref 0.4–1.2)
EOSINOPHIL # BLD: 1 %
EOSINOPHILS ABSOLUTE: 0.1 THOU/MM3 (ref 0–0.4)
GFR SERPL CREATININE-BSD FRML MDRD: 53 ML/MIN/1.73M2
GLUCOSE BLD-MCNC: 143 MG/DL (ref 70–108)
GLUCOSE BLD-MCNC: 148 MG/DL (ref 70–108)
GLUCOSE BLD-MCNC: 200 MG/DL (ref 70–108)
GLUCOSE BLD-MCNC: 273 MG/DL (ref 70–108)
GLUCOSE BLD-MCNC: 277 MG/DL (ref 70–108)
HCT VFR BLD CALC: 46.5 % (ref 42–52)
HEMOGLOBIN: 15.8 GM/DL (ref 14–18)
INR BLD: 4.3 (ref 0.85–1.13)
LACTIC ACID: 1 MMOL/L (ref 0.5–2.2)
LYMPHOCYTES # BLD: 6.3 %
LYMPHOCYTES ABSOLUTE: 0.8 THOU/MM3 (ref 1–4.8)
MCH RBC QN AUTO: 33.6 PG (ref 27–31)
MCHC RBC AUTO-ENTMCNC: 34 GM/DL (ref 33–37)
MCV RBC AUTO: 98.6 FL (ref 80–94)
MONOCYTES # BLD: 9.2 %
MONOCYTES ABSOLUTE: 1.1 THOU/MM3 (ref 0.4–1.3)
NUCLEATED RED BLOOD CELLS: 0 /100 WBC
PDW BLD-RTO: 14.5 % (ref 11.5–14.5)
PLATELET # BLD: 131 THOU/MM3 (ref 130–400)
PMV BLD AUTO: 9.4 MCM (ref 7.4–10.4)
POTASSIUM SERPL-SCNC: 3.5 MEQ/L (ref 3.5–5.2)
PROSTATE SPECIFIC ANTIGEN: 0.18 NG/ML (ref 0–1)
RBC # BLD: 4.71 MILL/MM3 (ref 4.7–6.1)
SEG NEUTROPHILS: 83.2 %
SEGMENTED NEUTROPHILS ABSOLUTE COUNT: 10.1 THOU/MM3 (ref 1.8–7.7)
SODIUM BLD-SCNC: 141 MEQ/L (ref 135–145)
TOTAL CK: 115 U/L (ref 55–170)
TOTAL PROTEIN: 5.2 G/DL (ref 6.1–8)
WBC # BLD: 12.1 THOU/MM3 (ref 4.8–10.8)

## 2017-12-23 PROCEDURE — 99233 SBSQ HOSP IP/OBS HIGH 50: CPT | Performed by: FAMILY MEDICINE

## 2017-12-23 PROCEDURE — G0103 PSA SCREENING: HCPCS

## 2017-12-23 PROCEDURE — 6360000004 HC RX CONTRAST MEDICATION: Performed by: INTERNAL MEDICINE

## 2017-12-23 PROCEDURE — 6370000000 HC RX 637 (ALT 250 FOR IP): Performed by: PSYCHIATRY & NEUROLOGY

## 2017-12-23 PROCEDURE — 36415 COLL VENOUS BLD VENIPUNCTURE: CPT

## 2017-12-23 PROCEDURE — 93005 ELECTROCARDIOGRAM TRACING: CPT

## 2017-12-23 PROCEDURE — 80053 COMPREHEN METABOLIC PANEL: CPT

## 2017-12-23 PROCEDURE — 85025 COMPLETE CBC W/AUTO DIFF WBC: CPT

## 2017-12-23 PROCEDURE — 83605 ASSAY OF LACTIC ACID: CPT

## 2017-12-23 PROCEDURE — 6370000000 HC RX 637 (ALT 250 FOR IP): Performed by: INTERNAL MEDICINE

## 2017-12-23 PROCEDURE — 82550 ASSAY OF CK (CPK): CPT

## 2017-12-23 PROCEDURE — 99232 SBSQ HOSP IP/OBS MODERATE 35: CPT | Performed by: NEUROLOGICAL SURGERY

## 2017-12-23 PROCEDURE — 71260 CT THORAX DX C+: CPT

## 2017-12-23 PROCEDURE — 82378 CARCINOEMBRYONIC ANTIGEN: CPT

## 2017-12-23 PROCEDURE — 86301 IMMUNOASSAY TUMOR CA 19-9: CPT

## 2017-12-23 PROCEDURE — S0028 INJECTION, FAMOTIDINE, 20 MG: HCPCS | Performed by: SURGERY

## 2017-12-23 PROCEDURE — 99231 SBSQ HOSP IP/OBS SF/LOW 25: CPT | Performed by: NURSE PRACTITIONER

## 2017-12-23 PROCEDURE — 82948 REAGENT STRIP/BLOOD GLUCOSE: CPT

## 2017-12-23 PROCEDURE — 2500000003 HC RX 250 WO HCPCS: Performed by: SURGERY

## 2017-12-23 PROCEDURE — 6360000002 HC RX W HCPCS: Performed by: SURGERY

## 2017-12-23 PROCEDURE — 6370000000 HC RX 637 (ALT 250 FOR IP): Performed by: FAMILY MEDICINE

## 2017-12-23 PROCEDURE — 2100000000 HC CCU R&B

## 2017-12-23 PROCEDURE — 6370000000 HC RX 637 (ALT 250 FOR IP): Performed by: NURSE PRACTITIONER

## 2017-12-23 PROCEDURE — 2580000003 HC RX 258: Performed by: SURGERY

## 2017-12-23 PROCEDURE — 85610 PROTHROMBIN TIME: CPT

## 2017-12-23 RX ORDER — POTASSIUM CHLORIDE 20 MEQ/1
40 TABLET, EXTENDED RELEASE ORAL ONCE
Status: COMPLETED | OUTPATIENT
Start: 2017-12-23 | End: 2017-12-23

## 2017-12-23 RX ADMIN — THIAMINE HCL (VITAMIN B1) 50 MG TABLET 100 MG: at 08:36

## 2017-12-23 RX ADMIN — METOPROLOL TARTRATE 50 MG: 50 TABLET, FILM COATED ORAL at 08:36

## 2017-12-23 RX ADMIN — HYDROCODONE BITARTRATE AND ACETAMINOPHEN 1 TABLET: 7.5; 325 TABLET ORAL at 14:37

## 2017-12-23 RX ADMIN — METOPROLOL TARTRATE 50 MG: 50 TABLET, FILM COATED ORAL at 13:51

## 2017-12-23 RX ADMIN — INSULIN LISPRO 9 UNITS: 100 INJECTION, SOLUTION INTRAVENOUS; SUBCUTANEOUS at 08:47

## 2017-12-23 RX ADMIN — ALPRAZOLAM 0.25 MG: 0.25 TABLET ORAL at 03:14

## 2017-12-23 RX ADMIN — MEXILETINE HYDROCHLORIDE 300 MG: 150 CAPSULE ORAL at 13:53

## 2017-12-23 RX ADMIN — MEXILETINE HYDROCHLORIDE 300 MG: 150 CAPSULE ORAL at 05:50

## 2017-12-23 RX ADMIN — INSULIN LISPRO 3 UNITS: 100 INJECTION, SOLUTION INTRAVENOUS; SUBCUTANEOUS at 19:07

## 2017-12-23 RX ADMIN — QUETIAPINE FUMARATE 100 MG: 100 TABLET ORAL at 08:36

## 2017-12-23 RX ADMIN — Medication 10 ML: at 08:37

## 2017-12-23 RX ADMIN — FUROSEMIDE 40 MG: 40 TABLET ORAL at 08:36

## 2017-12-23 RX ADMIN — MEXILETINE HYDROCHLORIDE 300 MG: 150 CAPSULE ORAL at 20:44

## 2017-12-23 RX ADMIN — INSULIN LISPRO 6 UNITS: 100 INJECTION, SOLUTION INTRAVENOUS; SUBCUTANEOUS at 12:27

## 2017-12-23 RX ADMIN — IOPAMIDOL 85 ML: 755 INJECTION, SOLUTION INTRAVENOUS at 18:56

## 2017-12-23 RX ADMIN — AMIODARONE HYDROCHLORIDE 200 MG: 200 TABLET ORAL at 08:36

## 2017-12-23 RX ADMIN — TAMSULOSIN HYDROCHLORIDE 0.4 MG: 0.4 CAPSULE ORAL at 08:36

## 2017-12-23 RX ADMIN — ALPRAZOLAM 0.25 MG: 0.25 TABLET ORAL at 23:05

## 2017-12-23 RX ADMIN — LINAGLIPTIN 5 MG: 5 TABLET, FILM COATED ORAL at 08:36

## 2017-12-23 RX ADMIN — POTASSIUM CHLORIDE 40 MEQ: 1500 TABLET, EXTENDED RELEASE ORAL at 13:51

## 2017-12-23 RX ADMIN — HYDRALAZINE HYDROCHLORIDE 20 MG: 20 INJECTION INTRAMUSCULAR; INTRAVENOUS at 03:14

## 2017-12-23 RX ADMIN — HYDROCODONE BITARTRATE AND ACETAMINOPHEN 1 TABLET: 7.5; 325 TABLET ORAL at 08:20

## 2017-12-23 RX ADMIN — HYDRALAZINE HYDROCHLORIDE 20 MG: 20 INJECTION INTRAMUSCULAR; INTRAVENOUS at 22:35

## 2017-12-23 RX ADMIN — QUETIAPINE FUMARATE 100 MG: 100 TABLET ORAL at 20:44

## 2017-12-23 RX ADMIN — FAMOTIDINE 20 MG: 10 INJECTION, SOLUTION INTRAVENOUS at 08:57

## 2017-12-23 RX ADMIN — METOPROLOL TARTRATE 50 MG: 50 TABLET, FILM COATED ORAL at 20:44

## 2017-12-23 RX ADMIN — Medication 2 UNITS: at 20:45

## 2017-12-23 RX ADMIN — HYDROCODONE BITARTRATE AND ACETAMINOPHEN 1 TABLET: 7.5; 325 TABLET ORAL at 20:44

## 2017-12-23 RX ADMIN — ALPRAZOLAM 0.25 MG: 0.25 TABLET ORAL at 14:37

## 2017-12-23 RX ADMIN — HYDROCODONE BITARTRATE AND ACETAMINOPHEN 1 TABLET: 7.5; 325 TABLET ORAL at 01:51

## 2017-12-23 RX ADMIN — FAMOTIDINE 20 MG: 10 INJECTION, SOLUTION INTRAVENOUS at 20:45

## 2017-12-23 RX ADMIN — Medication 10 ML: at 21:00

## 2017-12-23 RX ADMIN — FINASTERIDE 5 MG: 5 TABLET, FILM COATED ORAL at 08:36

## 2017-12-23 ASSESSMENT — PAIN DESCRIPTION - PAIN TYPE
TYPE: ACUTE PAIN;CHRONIC PAIN
TYPE: ACUTE PAIN;CHRONIC PAIN

## 2017-12-23 ASSESSMENT — PAIN SCALES - GENERAL
PAINLEVEL_OUTOF10: 5
PAINLEVEL_OUTOF10: 0
PAINLEVEL_OUTOF10: 7
PAINLEVEL_OUTOF10: 0
PAINLEVEL_OUTOF10: 0
PAINLEVEL_OUTOF10: 8
PAINLEVEL_OUTOF10: 7
PAINLEVEL_OUTOF10: 8
PAINLEVEL_OUTOF10: 8

## 2017-12-23 ASSESSMENT — PAIN DESCRIPTION - LOCATION
LOCATION: BACK
LOCATION: BACK

## 2017-12-23 ASSESSMENT — PAIN DESCRIPTION - FREQUENCY
FREQUENCY: CONTINUOUS
FREQUENCY: CONTINUOUS

## 2017-12-23 ASSESSMENT — PAIN SCALES - WONG BAKER: WONGBAKER_NUMERICALRESPONSE: 0

## 2017-12-23 ASSESSMENT — PAIN DESCRIPTION - DESCRIPTORS
DESCRIPTORS: ACHING;DISCOMFORT;PENETRATING
DESCRIPTORS: ACHING;SHARP;DISCOMFORT

## 2017-12-23 ASSESSMENT — PAIN DESCRIPTION - ORIENTATION
ORIENTATION: LOWER;MID
ORIENTATION: LOWER

## 2017-12-23 NOTE — PROGRESS NOTES
dextrose 5 % and 0.9 % NaCl 100 mL/hr PRN   ALPRAZolam 0.25 mg TID PRN   HYDROcodone-acetaminophen 1 tablet Q6H PRN   nicotine 1 puff PRN   magnesium hydroxide 30 mL Daily PRN       Diagnostics:  EK-DEC-2017 22:53:20 [facility]-ICU ROUTINE RETRIEVAL  Sinus bradycardia  ST & T wave abnormality, consider anterolateral ischemia  Prolonged QT interval or tu fusion, consider myocardial disease, electrolyte imbalance, or drug effects  Abnormal ECG  When compared with ECG of 17-DEC-2017 17:16,  T wave inversion now evident in Anterior leads  T wave inversion less evident in Lateral leads      Echo:   Electronically signed by Cira Rondon MD (Interpreting   physician) on 2017 at 05:39 PM   ----------------------------------------------------------------      Findings      Mitral Valve   The mitral valve structure was normal with normal leaflet separation.   DOPPLER: The transmitral velocity was within the normal range with no   evidence for mitral stenosis. Mild mitral regurgitation is present.      Aortic Valve   The aortic valve was trileaflet with normal thickness and cuspal   separation. DOPPLER: Transaortic velocity was within the normal range with   no evidence of aortic stenosis. There was no evidence of aortic   regurgitation.      Tricuspid Valve   The tricuspid valve structure was normal with normal leaflet separation.   DOPPLER: There was no evidence of tricuspid stenosis.   Mild tricuspid regurgitation visualized.      Pulmonic Valve   The pulmonic valve leaflets exhibited normal thickness, no calcification,   and normal cuspal separation.  DOPPLER: The transpulmonic velocity was   within the normal range with no evidence for regurgitation.      Left Atrium   The left atrium is Moderately dilated.      Left Ventricle   Left Ventricular size is Mildly increased .   Normal left ventricular wall thickness.   There was moderate global hypokinesis of the left ventricle.   Systolic function was moderately reduced.   Ejection fraction is visually estimated at 40%.  Doppler parameters were consistent with abnormal left ventricular   relaxation (grade 1 diastolic dysfunction).      Right Atrium   Right atrial size was normal.      Right Ventricle   The right ventricular size was normal with normal systolic function and   wall thickness.   Pacer Wire visualized in right ventricle.      Pericardial Effusion   The pericardium was normal in appearance with no evidence of a pericardial   effusion.      Pleural Effusion   No evidence of pleural effusion.      Aorta / Great Vessels   -Aortic root dimension within normal limits.   -The Pulmonary artery is within normal limits.   -IVC size is within normal limits with normal respiratory phasic changes.       Stress:   IMPRESSIONS:   -  Abnormal study. Anne-Marie Maria G was a moderate amount of ischemia in the inferolateral region.  -  Left ventricular systolic function was reduced, without distinct regional wall motion abnormalities. -  Clinical correlation is recommended. Prepared and signed by    Sherman Luna MD  Signed 11/17/2014     Left Heart Cath:   HEMODYNAMIC RESULTS/LEFT VENTRICULOGRAM:  Left ventricular end diastolic  pressure was 12 mmHg with no significant change before and after contrast  injection. There was no significant gradient across the aortic valve to  signify aortic stenosis. The left ventricular function was globally  hyperkinetic with EF of 35% with quite a bit of PVCs which makes it difficult  to accurately assess the ejection fraction. NATIVE CORONARY ARTERIES:    1. Left main is patent and gives rise to LAD and left circ. 2.    The LAD has nonobstructive mild luminal irregularity. 3.    The diagonal artery has 50-60% mid stenosis. 4.    The left circumflex artery has 80% stenosis with some competitive          flow distally from the LIMA.     5.    Right coronary artery has diffuse 60% stenosis proximally and has

## 2017-12-23 NOTE — PROGRESS NOTES
showed NAP(12/17/17), CT Lumbar (12/12/17) showed Rt L5 pars defect without spondylolisthesis, mild annular bulges from L2-3 through L5-S1 with mild to moderate spinal stenosis, and possible impingement upon the Rt L2 nerve root at  L2-3. 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 Repeat MRI lumbar spine with 2 areas of intradural extra medullary lesions -- neurosurgery to address 12/23  -- cardiology consulted for EKG changes, elevated troponin, significant cardiac hx. Subjective:   -- 12/23/2017  --> pt more calm, appropriate - no more hallucinations. Back hurts - tailbone area mainly. Mason removed yesterday - urinating w/o difficulty w/o bowel or bladder incontinence. Denies cp, sob. Denies abd pain, n/v.  French po. On RA.   Last BM 12/22      Medications:  Reviewed    Infusion Medications    dexmedetomidine HCl in NaCl 0.6 mcg/kg/hr (12/22/17 0918)    dextrose 5 % and 0.9 % NaCl      niCARdipine in sodium chloride 2 mg/hr (12/22/17 0920)     Scheduled Medications    amiodarone  200 mg Oral Daily    furosemide  40 mg Oral Daily    sacubitril-valsartan  1 tablet Oral BID    metoprolol tartrate  50 mg Oral TID    QUEtiapine  100 mg Oral TID    sodium chloride flush  10 mL Intravenous 2 times per day    famotidine (PEPCID) injection  20 mg Intravenous BID    vitamin B-1  100 mg Oral Daily    ALPRAZolam  0.5 mg Oral Once    ALPRAZolam  0.5 mg Oral Once    insulin lispro  0-18 Units Subcutaneous TID WC    insulin lispro  0-9 Units Subcutaneous Nightly    lidocaine  3 patch Transdermal Daily    nicotine  1 patch Transdermal Daily    linagliptin  5 mg Oral Daily    LORazepam  1 mg Intravenous Once    finasteride  5 mg Oral Daily    mexiletine  300 mg Oral 3 times per day    potassium chloride  20 mEq Oral Once    tamsulosin  0.4 mg Oral Daily     PRN Meds: hydrALAZINE, sodium chloride flush, acetaminophen, ondansetron, HYDROmorphone, metoprolol, LORazepam **OR** LORazepam **OR** LORazepam **OR** LORazepam **OR** LORazepam **OR** LORazepam **OR** LORazepam **OR** LORazepam, glucose, dextrose, glucagon (rDNA), dextrose 5 % and 0.9 % NaCl, ALPRAZolam, HYDROcodone-acetaminophen, nicotine, magnesium hydroxide      Intake/Output Summary (Last 24 hours) at 12/23/17 0803  Last data filed at 12/23/17 0500   Gross per 24 hour   Intake             3390 ml   Output             2010 ml   Net             1380 ml       Diet:  DIET CARDIAC; Carb Control: 4 carbs/meal (approximate 1800 kcals/day)    Exam:  BP (!) 171/81   Pulse 65   Temp 99 °F (37.2 °C) (Oral)   Resp 11   Wt 269 lb 6.4 oz (122.2 kg)   SpO2 96%   BMI 34.59 kg/m²   RA    General appearance: no distress, alert and oriented x 3 and much more cooperative and calm  HEENT: Pupils equal, round, and reactive to light. Conjunctivae/corneas clear. Neck: Supple, with full range of motion. No jugular venous distention. Trachea midline. Respiratory:  Normal respiratory effort. Clear to auscultation, bilaterally without Rales/Wheezes/Rhonchi. No respiratory distress or accessory muscle use. Cardiovascular: RRR, 2/6 JOELLE, No rubs or gallops. Abdomen: Soft, non-tender, non-distended with normal bowel sounds. No rebound or guarding  Musculoskeletal: No clubbing, cyanosis or edema bilaterally. Full range of motion without deformity. No calf tenderness palpation  Skin: Skin color, texture, turgor normal.  No rashes or lesions. Neurologic:  Neurovascularly intact without any focal sensory/motor deficits. Cranial nerves: II-XII intact, grossly non-focal.  Strength 5/5 in BLE  Psychiatric: seeing bugs but oriented x person, place.   Capillary Refill: Brisk,< 3 seconds   Peripheral Pulses: +2 palpable, equal bilaterally       Labs:   Recent Labs      12/21/17   1942 into the foramina bilaterally producing mild/moderate bilateral foraminal stenosis. At L4-5, the canal is uncompromised. There is minimal bulging disc material. There is mild facet degeneration. There is mild foraminal stenosis bilaterally. At L5-S1, the canal and foramina are grossly uncompromised. Mild facet degenerative changes are present.     Impression:       NO EVIDENCE OF ACUTE ABNORMALITY. THERE ARE 2 AREAS OF INTRADURAL EXTRA MEDULLARY LESIONS. THE LARGEST IS LOCATED AT THE L1 LEVEL, 26 MM X 9 MM X 9 MM. VERY MINIMAL PERIPHERAL PATHOLOGIC ENHANCEMENT. THE SECOND IS LOCATED AT THE S1 LEVEL, 7 MM X 6 MM X 10 MM, DEMONSTRATING MINIMAL   HETEROGENEOUS PATHOLOGIC ENHANCEMENT. THERE IS ALSO MILD ENHANCING CHARACTERISTICS OF THE ARACHNOID. COMBINATION OF FINDINGS IS CONCERNING FOR POSSIBLE METASTATIC LESIONS WITH CSF SPREAD. PRIMITIVE NEURAL ECTODERMAL TUMOR (PNET) IS ANOTHER POSSIBLE   EXPLANATION FOR THE COMBINATION OF FINDINGS. 2 SEPARATE LESIONS OF DIFFERENT PATHOLOGY IS FELT UNLIKELY THOUGH POSSIBLY CONSIDER. THE LARGEST LESION, LOCATED AT THE L1 LEVEL, PRODUCES SECONDARY SEVERE STENOSIS OF THE CONTENTS OF THE THECAL SAC. NO   SIGNIFICANT STENOSIS PRODUCED BY THE SMALLER LESION. MILD DEGENERATIVE CHANGES AS DISCUSSED. **This report has been created using voice recognition software. It may contain minor errors which are inherent in voice recognition technology. **        Final report electronically signed by Dr. Bobbi León on 12/22/2017 2:20 PM              XR Chest Portable   Final Result   1. Lower lung volumes than the previous study with mild compressive atelectasis. **This report has been created using voice recognition software. It may contain minor errors which are inherent in voice recognition technology. **      Final report electronically signed by Dr. Maria Fernanda Amor on 12/22/2017 1:59 AM      MRI LUMBAR SPINE WO CONTRAST   Final Result   SEVERELY LIMITED EXAM. INCOMPLETELY CHARACTERIZED LESION OF THE SPINAL CANAL, EXTRA MEDULLARY, 26 MM X 7.5 MM X 11 MM, TO THE LIMITS DEFINED, LIKELY REPRESENTING SPINAL SCHWANNOMA. SEVERE STENOSIS OF THE CAUDA EQUINA RESULTS FROM THIS. IT IS INCOMPLETELY CHARACTERIZED. CHRONIC DEGENERATIVE DISC CHANGES AND ADJACENT DEGENERATIVE ENDPLATE CHANGES PRODUCE BILATERAL FORAMINAL STENOSIS, RIGHT MORE THAN LEFT, AT L2-3, L3-4, AND L4-5, MOST IMPRESSIVELY AT THE L3-4 AND L4-5 LEVELS. **This report has been created using voice recognition software. It may contain minor errors which are inherent in voice recognition technology. **            Final report electronically signed by Dr. Nicole Ramos on 12/19/2017 2:46 PM      XR SHOULDER RIGHT (MIN 2 VIEWS)   Final Result   1. No acute fracture or malalignment. **This report has been created using voice recognition software. It may contain minor errors which are inherent in voice recognition technology. **      Final report electronically signed by Dr. Nancy Rich on 12/17/2017 3:18 PM      CT HEAD WO CONTRAST   Final Result   1. No mass effect or acute hemorrhage. 2. Mild chronic periventricular small vessel ischemic changes. **This report has been created using voice recognition software. It may contain minor errors which are inherent in voice recognition technology. **      Final report electronically signed by Dr. Lorraine Coe on 12/17/2017 2:44 PM          Diet: DIET CARDIAC; Carb Control: 4 carbs/meal (approximate 1800 kcals/day)    Mason: yes - removed 12/22     Microbiology:  Urine cx 12/20 = mixed growth    HIV 12/20 (-)    MRSA (-)    Tele:  SR/SB    DVT prophylaxis: [] Lovenox                                 [x] SCDs                                 [] SQ Heparin                                 [] Encourage ambulation           [x] Already on Anticoagulation - was on coumadin     Disposition:    [] Home       [] TCU       [] Rehab       [] Psych       [] SNF       [] [facility]       [x] Other- ??     Code Status: Full Code    PT/OT Eval Status: consulted      Electronically signed by SHAWANDA HINKLE MD on 12/23/2017 at 8:03AM when pt evaluated - final note filed late

## 2017-12-23 NOTE — PLAN OF CARE
Problem: Pain:  Goal: Pain level will decrease  Pain level will decrease   Outcome: Ongoing  Pt still reports back pain that is tolerable with PRN pain medications    Problem: Falls - Risk of  Goal: Absence of falls  Outcome: Met This Shift  Patient remains free from falls. Bed in low position, locked, and alarm on. Patient up with assistance. Fall risk band on and sign posted. Fall risk assessment completed. Nonskid footwear applied. Room adequately lit. Call light and bedside table with in reach. Will continue to monitor. Problem: Risk for Impaired Skin Integrity  Goal: Tissue integrity - skin and mucous membranes  Structural intactness and normal physiological function of skin and  mucous membranes. Outcome: Met This Shift      Problem: Safety:  Goal: Ability to contract for his/her safety will improve  Ability to contract for his/her safety will improve   Outcome: Ongoing  Pt still impulsive and uncooperative at times. Sitter remains indicated. Problem: Discharge Planning:  Goal: Participates in care planning  Participates in care planning   Outcome: Ongoing  Care plan reviewed with patient. Verbalized understanding plan of care and contribute to goal setting.     Goal: Discharged to appropriate level of care  Discharged to appropriate level of care   Outcome: Ongoing  Pt remains in CCU 3A 04    Problem: Anxiety/Stress:  Goal: Level of anxiety will decrease  Level of anxiety will decrease   Outcome: Met This Shift      Problem: Mental Status - Impaired:  Goal: Mental status will be restored to baseline  Mental status will be restored to baseline   Outcome: Met This Shift      Problem: Skin Integrity - Impaired:  Goal: Will show no infection signs and symptoms  Will show no infection signs and symptoms   Outcome: Met This Shift    Goal: Absence of new skin breakdown  Absence of new skin breakdown   Outcome: Met This Shift      Problem: Restraint Use - Nonviolent/Non-Self-Destructive Behavior:  Goal: Absence of restraint indications  Absence of restraint indications   Outcome: Met This Shift    Goal: Absence of restraint-related injury  Absence of restraint-related injury   Outcome: Met This Shift

## 2017-12-24 LAB
ANION GAP SERPL CALCULATED.3IONS-SCNC: 14 MEQ/L (ref 8–16)
BUN BLDV-MCNC: 21 MG/DL (ref 7–22)
CA 19-9: 61 U/ML (ref 0–35)
CALCIUM SERPL-MCNC: 8.3 MG/DL (ref 8.5–10.5)
CHLORIDE BLD-SCNC: 106 MEQ/L (ref 98–111)
CO2: 20 MEQ/L (ref 23–33)
CREAT SERPL-MCNC: 1.1 MG/DL (ref 0.4–1.2)
GFR SERPL CREATININE-BSD FRML MDRD: 70 ML/MIN/1.73M2
GLUCOSE BLD-MCNC: 209 MG/DL (ref 70–108)
GLUCOSE BLD-MCNC: 213 MG/DL (ref 70–108)
GLUCOSE BLD-MCNC: 227 MG/DL (ref 70–108)
GLUCOSE BLD-MCNC: 325 MG/DL (ref 70–108)
GLUCOSE BLD-MCNC: 99 MG/DL (ref 70–108)
INR BLD: 1.93 (ref 0.85–1.13)
POTASSIUM SERPL-SCNC: 3.8 MEQ/L (ref 3.5–5.2)
SODIUM BLD-SCNC: 140 MEQ/L (ref 135–145)

## 2017-12-24 PROCEDURE — 85610 PROTHROMBIN TIME: CPT

## 2017-12-24 PROCEDURE — 2580000003 HC RX 258: Performed by: SURGERY

## 2017-12-24 PROCEDURE — 6370000000 HC RX 637 (ALT 250 FOR IP): Performed by: NURSE PRACTITIONER

## 2017-12-24 PROCEDURE — 6360000002 HC RX W HCPCS: Performed by: SURGERY

## 2017-12-24 PROCEDURE — 2500000003 HC RX 250 WO HCPCS: Performed by: SURGERY

## 2017-12-24 PROCEDURE — 99232 SBSQ HOSP IP/OBS MODERATE 35: CPT | Performed by: FAMILY MEDICINE

## 2017-12-24 PROCEDURE — S0028 INJECTION, FAMOTIDINE, 20 MG: HCPCS | Performed by: SURGERY

## 2017-12-24 PROCEDURE — 6370000000 HC RX 637 (ALT 250 FOR IP): Performed by: SURGERY

## 2017-12-24 PROCEDURE — 82948 REAGENT STRIP/BLOOD GLUCOSE: CPT

## 2017-12-24 PROCEDURE — 80048 BASIC METABOLIC PNL TOTAL CA: CPT

## 2017-12-24 PROCEDURE — 1200000003 HC TELEMETRY R&B

## 2017-12-24 PROCEDURE — 36415 COLL VENOUS BLD VENIPUNCTURE: CPT

## 2017-12-24 PROCEDURE — 6370000000 HC RX 637 (ALT 250 FOR IP): Performed by: INTERNAL MEDICINE

## 2017-12-24 PROCEDURE — 6370000000 HC RX 637 (ALT 250 FOR IP): Performed by: PSYCHIATRY & NEUROLOGY

## 2017-12-24 PROCEDURE — 6370000000 HC RX 637 (ALT 250 FOR IP): Performed by: FAMILY MEDICINE

## 2017-12-24 RX ADMIN — Medication 10 ML: at 08:16

## 2017-12-24 RX ADMIN — QUETIAPINE FUMARATE 100 MG: 100 TABLET ORAL at 08:15

## 2017-12-24 RX ADMIN — HYDROCODONE BITARTRATE AND ACETAMINOPHEN 1 TABLET: 7.5; 325 TABLET ORAL at 04:17

## 2017-12-24 RX ADMIN — LINAGLIPTIN 5 MG: 5 TABLET, FILM COATED ORAL at 08:15

## 2017-12-24 RX ADMIN — HYDROMORPHONE HYDROCHLORIDE 1 MG: 1 INJECTION, SOLUTION INTRAMUSCULAR; INTRAVENOUS; SUBCUTANEOUS at 16:26

## 2017-12-24 RX ADMIN — QUETIAPINE FUMARATE 100 MG: 100 TABLET ORAL at 14:17

## 2017-12-24 RX ADMIN — MEXILETINE HYDROCHLORIDE 300 MG: 150 CAPSULE ORAL at 07:02

## 2017-12-24 RX ADMIN — METOPROLOL TARTRATE 50 MG: 50 TABLET, FILM COATED ORAL at 22:05

## 2017-12-24 RX ADMIN — METOPROLOL TARTRATE 50 MG: 50 TABLET, FILM COATED ORAL at 16:26

## 2017-12-24 RX ADMIN — Medication 3 UNITS: at 21:59

## 2017-12-24 RX ADMIN — MEXILETINE HYDROCHLORIDE 300 MG: 150 CAPSULE ORAL at 22:03

## 2017-12-24 RX ADMIN — AMIODARONE HYDROCHLORIDE 200 MG: 200 TABLET ORAL at 08:15

## 2017-12-24 RX ADMIN — HYDROMORPHONE HYDROCHLORIDE 1 MG: 1 INJECTION, SOLUTION INTRAMUSCULAR; INTRAVENOUS; SUBCUTANEOUS at 01:02

## 2017-12-24 RX ADMIN — MEXILETINE HYDROCHLORIDE 300 MG: 150 CAPSULE ORAL at 14:17

## 2017-12-24 RX ADMIN — ACETAMINOPHEN 650 MG: 325 TABLET ORAL at 14:34

## 2017-12-24 RX ADMIN — FAMOTIDINE 20 MG: 10 INJECTION, SOLUTION INTRAVENOUS at 08:20

## 2017-12-24 RX ADMIN — Medication 10 ML: at 22:00

## 2017-12-24 RX ADMIN — TAMSULOSIN HYDROCHLORIDE 0.4 MG: 0.4 CAPSULE ORAL at 08:14

## 2017-12-24 RX ADMIN — QUETIAPINE FUMARATE 100 MG: 100 TABLET ORAL at 21:59

## 2017-12-24 RX ADMIN — THIAMINE HCL (VITAMIN B1) 50 MG TABLET 100 MG: at 08:15

## 2017-12-24 RX ADMIN — INSULIN LISPRO 12 UNITS: 100 INJECTION, SOLUTION INTRAVENOUS; SUBCUTANEOUS at 11:36

## 2017-12-24 RX ADMIN — INSULIN LISPRO 6 UNITS: 100 INJECTION, SOLUTION INTRAVENOUS; SUBCUTANEOUS at 08:17

## 2017-12-24 RX ADMIN — FUROSEMIDE 40 MG: 40 TABLET ORAL at 08:15

## 2017-12-24 RX ADMIN — HYDROCODONE BITARTRATE AND ACETAMINOPHEN 1 TABLET: 7.5; 325 TABLET ORAL at 10:57

## 2017-12-24 RX ADMIN — HYDRALAZINE HYDROCHLORIDE 20 MG: 20 INJECTION INTRAMUSCULAR; INTRAVENOUS at 06:03

## 2017-12-24 RX ADMIN — METOPROLOL TARTRATE 50 MG: 50 TABLET, FILM COATED ORAL at 08:15

## 2017-12-24 RX ADMIN — FINASTERIDE 5 MG: 5 TABLET, FILM COATED ORAL at 08:15

## 2017-12-24 RX ADMIN — HYDRALAZINE HYDROCHLORIDE 20 MG: 20 INJECTION INTRAMUSCULAR; INTRAVENOUS at 23:37

## 2017-12-24 RX ADMIN — FAMOTIDINE 20 MG: 10 INJECTION, SOLUTION INTRAVENOUS at 22:08

## 2017-12-24 RX ADMIN — HYDROCODONE BITARTRATE AND ACETAMINOPHEN 1 TABLET: 7.5; 325 TABLET ORAL at 18:44

## 2017-12-24 ASSESSMENT — PAIN SCALES - GENERAL
PAINLEVEL_OUTOF10: 7
PAINLEVEL_OUTOF10: 6
PAINLEVEL_OUTOF10: 0
PAINLEVEL_OUTOF10: 7
PAINLEVEL_OUTOF10: 7
PAINLEVEL_OUTOF10: 6
PAINLEVEL_OUTOF10: 7
PAINLEVEL_OUTOF10: 7
PAINLEVEL_OUTOF10: 9
PAINLEVEL_OUTOF10: 0

## 2017-12-24 ASSESSMENT — PAIN DESCRIPTION - FREQUENCY
FREQUENCY: INTERMITTENT
FREQUENCY: CONTINUOUS

## 2017-12-24 ASSESSMENT — PAIN DESCRIPTION - PROGRESSION
CLINICAL_PROGRESSION: GRADUALLY WORSENING
CLINICAL_PROGRESSION: GRADUALLY WORSENING

## 2017-12-24 ASSESSMENT — PAIN DESCRIPTION - DESCRIPTORS
DESCRIPTORS: SHOOTING;ACHING
DESCRIPTORS: ACHING;SHARP;DISCOMFORT;CONSTANT

## 2017-12-24 ASSESSMENT — PAIN DESCRIPTION - ORIENTATION
ORIENTATION: LOWER
ORIENTATION: LOWER

## 2017-12-24 ASSESSMENT — PAIN DESCRIPTION - LOCATION
LOCATION: BACK
LOCATION: HIP
LOCATION: BACK

## 2017-12-24 ASSESSMENT — PAIN DESCRIPTION - ONSET: ONSET: ON-GOING

## 2017-12-24 ASSESSMENT — PAIN DESCRIPTION - PAIN TYPE
TYPE: ACUTE PAIN;CHRONIC PAIN
TYPE: CHRONIC PAIN;ACUTE PAIN

## 2017-12-24 NOTE — PLAN OF CARE
Problem: Pain:  Goal: Pain level will decrease  Pain level will decrease   Outcome: Ongoing  Takes norco for lower back pain. Brings pain level down to tolerable. Does increase with movement. Problem: Falls - Risk of  Goal: Absence of falls  Outcome: Met This Shift  Free of injury or falls. Bed alarm on. Not always using call light when he wants up. Frequent rounding. Slipper socks on when up and use of walker    Comments: Care plan reviewed with patient and daughter. Patient and daughter verbalize understanding of the plan of care and contribute to goal setting.

## 2017-12-24 NOTE — PROGRESS NOTES
WNL  6. Metabolic acidosis -- ?due to Maureen, ? rhabdo -- up and down -- no LA done during admission -- afeb - IVF since 12/19 --> stopped 12/22 and monitor  7. Low grade temp -- 12/21 up to ~100 --Improving 12/23 and afeb 12/24  --> ?atelectasis - doubt DVT as pt on coumadin with elevated INR -- WBC trending back up 12/22 - ?mass on MRI L spine infxn -- ?need ID c/s -- cXR 12/22 (-) infiltrate - ?atelectasis   8. Leukocytosis -- trending up 12/22 to 14 but down to 12 on 12/23  - ?due to steroids given 12/17 - 12/19 --> see #6 -- ?need repeat cultures - ? ID c/s -- CXR 12/22 (-) -- ?related to back mass/schwannoma   9. MAUREEN on CKD stage II -- creat up to 1.9 on 12/19 --> improved to 1.1 on 12/24 --  improving with lasix, entresto held --> monitor as those resumed per cardio 12/22 -- monitor fluid status -- baseline 0.9 - 1.0  10. Rhabdomyolysis  -- POA -- CK trended down with IVF -- cont monitor  11. EKG changes/elevated troponin -- per cardio - no sx - not labeled as MI at this time -- Echo 12/21/17 EF 40%, mod global hypokinesis, grade 1 diastolic dysfxn  -- per cardio note 12/23 plan stress test?? For tues. 12. Accelerated HTN/uncontrolled essential HTN -- cardene gtt stopped 12/22 -- per cardio 12/22 resumed on home lopressor 50 mg bid and stopped IV lopressor 12/22 -- hydralazine 25 4 x day  -- entresto resumed 12/22 per cardio -- cont monitor and adjust prn  13. Lumbar spine severe stenosis -- per MRI 12/22/17 due to extra-medullary lesion -- NS following -- ?plan -- no incontinence or neuro deficits at this time 12/23  14. Coagulopathy due to coumadin/elevated INR -- cont to be elevated 4's 12/22 despite coumadin being held and last dose 12/18 -- LFT unremarkable 12/20 --> repeat 12/23 WNL  -- INR down to 1.93 on 12/24 --> cont hold coumadin in case procedure is needed -- ?need bridging but on for AF  15.  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 Ischemic cardiomyopathy with ICD/chronic systolic and diastolic CHF  -- lasix held as of 12/19 -- resumed 12/22 per cardio; Cont BB but changed from IV to po 12/22 per cardio -- On entresto (ARB) and resumed 12/22 per cardio  -- fluid status appears stable -- cXR 12/22 no pulm edema   -- Echo 12/21/17 EF 92%, grade 1 diastolic dysfxn  17. PAF -- po amio, BB -- currently SR -- coumadin held as INR elevated --> INR down to 1.93 on 12/24 --. ?bridge -- cont hold coumadin in case any procedure with above needed. 18. Type 2 DM -- SSI, tradjenta - BS appear stable -- monitor and adjust prn as po intake improves -- hgb A1C 12/17/17 = 9.5 -- home metformin held -- up and down - ??need low dose lantus  19. HLP -- statin held due to rhabdo  20. Hx VT s/p ablation 2014 -- po amio and mexitil -- cardio following - caution with seroquel, haldol, geodon  21. ETOH and polysubstance abuse -- no ETOH level done on admission - UDS + meth/amphetamine -- ?contrib to #1   22. Depression/anxiety -- psych eval done 12/20 - ?still following  23. BPH -- pringle in place 12/22 and removed 12/22 w/o issues with voiding -- proscar  24. Deconditioning -- PT/OT - PMR was consulted 12/20 and unable to eval due to #1 --> ?re- c/s -- cont monitor     dispo  -- 12/24 --> apprec consultants -- per NS oncology was consulted to eval for ?metastatic process contrib to back masses -- await recs. No BLE weakness or incontinence. Cont pain control. INR down to <2 now and will cont hold coumadin for now until further determination of any procedures with back masses needed - and per cardio ? ? stress test tues -- cont monitor tele, BP, lytes, renal fxn. Cont increase activity.   Mental status back to normal.  Transfer to tele bed.    -- 12/23 --> apprec consultants - pt's mental status much improved and appropriate -- now with NS to determine course of action with the [facility] MRI lesions -- cont current pain control as seems to be helping -- cont monitor lytes, BP, renal fxn - await any further cardio recs -- await consultants - ?tx out CCU     -- 12/22 --> pt mentally doing little better this am - less agitation - cont monitor closely;  WBC and temp up little - ?developing infxn vs ?atelectasis -- apprec cardio assist and resumed home meds - BB, entresto, lasix -- repeat MRI lumbar spine (p) and await further NS recs. Cont monitor lytes, renal fxn, BP, BS, CBC, temps closely, and mental status. Cont CCU care today. Chief Complaint: back pain    Hospital Course:  Meriel Primrose is a 46 y.o. male with CHF, CAD with hx CABG, CM with ICD, HTN, HLP, DM2, AFib, hx VT with ablation 2014 presented with back pain. On 12/20 pt had increasing agitation, confusion -psychiatry was consulted -- concern for withdrawal and significant amt of ativan was given without improvement in mental status thus was transferred to ICU - Dr. Rylie Islas managed pt while in ICU and was placed on precedex gtt, cardene gtt for HTN -- transferred to CCU 12/21 for need for ICU beds and hospitalist service was asked to resume care. --  CT Head that showed NAP(12/17/17), CT Lumbar (12/12/17) showed Rt L5 pars defect without spondylolisthesis, mild annular bulges from L2-3 through L5-S1 with mild to moderate spinal stenosis, and possible impingement upon the Rt L2 nerve root at  L2-3. 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 Repeat MRI lumbar spine 12/22/17 with 2 intradural extra-medullary lesions and one causes severe stenosis and has concerning enhancement for ?metastatic dz --> per NS Dr. Terry Vance consulted oncology to assist with r/o malignancy.   -- was on cardene gtt for HTN - stopped 12/22 and po meds adjusted by L1 to inferior L1. There is    secondary deviation of the cauda equina elements with significant compromise of the caudate lobe elements, producing near complete effacement of the subarachnoid spaces. This, combined with the motion degradation, results in a limited evaluation of the    signal characteristics of this intradural extra medullary structure. It measures approximately 26 mm craniocaudal, approximately 9 mm anterior posterior at approximately 9 mm transverse. T2 characteristics of this lesion are heterogeneous mildly    hyperintense. STIR signal characteristics are heterogeneous hyperintense, inconsistent with fat signal. The precontrast T1 components reveal hypointense signal characteristics diffusely with the exception of an irregular area of hyperintense signal    inferiorly and anteriorly, having irregular margins more along the posterior aspects rather than anterior. There are areas of hypointense T1 signal within this, possibly representing mineralization though no evidence of definite mineralized components    identified on the recent CT. Postcontrast images reveal no convincing pathologic enhancement, other than minimal amounts of enhancing components of the margin. The signal characteristics are inconsistent with schwannoma. There are not strongly suggestive    of metastatic lesion, given the relative absence of pathologic enhancement. Meningioma and hemangioblastoma are also unlikely, given the absence of vivid pathologic enhancement. Dermoid cyst is considered but unlikely given the inconsistent signal    characteristics relative to fat. Epidermoid cyst is a possible explanation.       Satisfactory appearance of the imaged portions of the spinal cord. Postcontrast images reveal some areas of minimal enhancing leptomeningeal services, consistent with minimal arachnoiditis.       There is a new area of poorly defined pathologic enhancing intradural extra medullary structure at the S1 level.  At

## 2017-12-25 LAB
ALBUMIN SERPL-MCNC: 2.5 G/DL (ref 3.5–5.1)
ALP BLD-CCNC: 68 U/L (ref 38–126)
ALT SERPL-CCNC: 19 U/L (ref 11–66)
ANION GAP SERPL CALCULATED.3IONS-SCNC: 9 MEQ/L (ref 8–16)
AST SERPL-CCNC: 17 U/L (ref 5–40)
BASOPHILS # BLD: 0.7 %
BASOPHILS ABSOLUTE: 0.1 THOU/MM3 (ref 0–0.1)
BILIRUB SERPL-MCNC: 0.5 MG/DL (ref 0.3–1.2)
BUN BLDV-MCNC: 18 MG/DL (ref 7–22)
CALCIUM SERPL-MCNC: 8.3 MG/DL (ref 8.5–10.5)
CHLORIDE BLD-SCNC: 100 MEQ/L (ref 98–111)
CO2: 26 MEQ/L (ref 23–33)
CREAT SERPL-MCNC: 1.2 MG/DL (ref 0.4–1.2)
EKG ATRIAL RATE: 62 BPM
EKG P AXIS: 56 DEGREES
EKG P-R INTERVAL: 166 MS
EKG Q-T INTERVAL: 472 MS
EKG QRS DURATION: 94 MS
EKG QTC CALCULATION (BAZETT): 479 MS
EKG R AXIS: 5 DEGREES
EKG T AXIS: 125 DEGREES
EKG VENTRICULAR RATE: 62 BPM
EOSINOPHIL # BLD: 3.5 %
EOSINOPHILS ABSOLUTE: 0.3 THOU/MM3 (ref 0–0.4)
GFR SERPL CREATININE-BSD FRML MDRD: 63 ML/MIN/1.73M2
GLUCOSE BLD-MCNC: 171 MG/DL (ref 70–108)
GLUCOSE BLD-MCNC: 180 MG/DL (ref 70–108)
GLUCOSE BLD-MCNC: 185 MG/DL (ref 70–108)
GLUCOSE BLD-MCNC: 251 MG/DL (ref 70–108)
GLUCOSE BLD-MCNC: 287 MG/DL (ref 70–108)
GLUCOSE BLD-MCNC: 327 MG/DL (ref 70–108)
HCT VFR BLD CALC: 44.4 % (ref 42–52)
HEMOGLOBIN: 15.2 GM/DL (ref 14–18)
INR BLD: 1.3 (ref 0.85–1.13)
LYMPHOCYTES # BLD: 9.5 %
LYMPHOCYTES ABSOLUTE: 0.8 THOU/MM3 (ref 1–4.8)
MCH RBC QN AUTO: 33.7 PG (ref 27–31)
MCHC RBC AUTO-ENTMCNC: 34.3 GM/DL (ref 33–37)
MCV RBC AUTO: 98.3 FL (ref 80–94)
MONOCYTES # BLD: 10.6 %
MONOCYTES ABSOLUTE: 0.9 THOU/MM3 (ref 0.4–1.3)
NUCLEATED RED BLOOD CELLS: 0 /100 WBC
PDW BLD-RTO: 14.2 % (ref 11.5–14.5)
PLATELET # BLD: 143 THOU/MM3 (ref 130–400)
PMV BLD AUTO: 8.8 MCM (ref 7.4–10.4)
POTASSIUM SERPL-SCNC: 3.7 MEQ/L (ref 3.5–5.2)
RBC # BLD: 4.52 MILL/MM3 (ref 4.7–6.1)
SEG NEUTROPHILS: 75.7 %
SEGMENTED NEUTROPHILS ABSOLUTE COUNT: 6.2 THOU/MM3 (ref 1.8–7.7)
SODIUM BLD-SCNC: 135 MEQ/L (ref 135–145)
TOTAL PROTEIN: 5.4 G/DL (ref 6.1–8)
WBC # BLD: 8.2 THOU/MM3 (ref 4.8–10.8)

## 2017-12-25 PROCEDURE — 2500000003 HC RX 250 WO HCPCS: Performed by: SURGERY

## 2017-12-25 PROCEDURE — 85610 PROTHROMBIN TIME: CPT

## 2017-12-25 PROCEDURE — 6370000000 HC RX 637 (ALT 250 FOR IP): Performed by: PSYCHIATRY & NEUROLOGY

## 2017-12-25 PROCEDURE — 2580000003 HC RX 258: Performed by: SURGERY

## 2017-12-25 PROCEDURE — 6370000000 HC RX 637 (ALT 250 FOR IP): Performed by: INTERNAL MEDICINE

## 2017-12-25 PROCEDURE — 6360000002 HC RX W HCPCS: Performed by: SURGERY

## 2017-12-25 PROCEDURE — 6370000000 HC RX 637 (ALT 250 FOR IP): Performed by: FAMILY MEDICINE

## 2017-12-25 PROCEDURE — 80053 COMPREHEN METABOLIC PANEL: CPT

## 2017-12-25 PROCEDURE — S0028 INJECTION, FAMOTIDINE, 20 MG: HCPCS | Performed by: SURGERY

## 2017-12-25 PROCEDURE — 82948 REAGENT STRIP/BLOOD GLUCOSE: CPT

## 2017-12-25 PROCEDURE — 36415 COLL VENOUS BLD VENIPUNCTURE: CPT

## 2017-12-25 PROCEDURE — 85025 COMPLETE CBC W/AUTO DIFF WBC: CPT

## 2017-12-25 PROCEDURE — 1200000003 HC TELEMETRY R&B

## 2017-12-25 PROCEDURE — 99232 SBSQ HOSP IP/OBS MODERATE 35: CPT | Performed by: INTERNAL MEDICINE

## 2017-12-25 PROCEDURE — 6370000000 HC RX 637 (ALT 250 FOR IP): Performed by: NURSE PRACTITIONER

## 2017-12-25 RX ADMIN — MEXILETINE HYDROCHLORIDE 300 MG: 150 CAPSULE ORAL at 14:51

## 2017-12-25 RX ADMIN — QUETIAPINE FUMARATE 100 MG: 100 TABLET ORAL at 08:26

## 2017-12-25 RX ADMIN — MEXILETINE HYDROCHLORIDE 300 MG: 150 CAPSULE ORAL at 20:09

## 2017-12-25 RX ADMIN — MEXILETINE HYDROCHLORIDE 300 MG: 150 CAPSULE ORAL at 06:38

## 2017-12-25 RX ADMIN — THIAMINE HCL (VITAMIN B1) 50 MG TABLET 100 MG: at 08:27

## 2017-12-25 RX ADMIN — HYDROCODONE BITARTRATE AND ACETAMINOPHEN 1 TABLET: 7.5; 325 TABLET ORAL at 00:38

## 2017-12-25 RX ADMIN — LINAGLIPTIN 5 MG: 5 TABLET, FILM COATED ORAL at 08:27

## 2017-12-25 RX ADMIN — INSULIN LISPRO 9 UNITS: 100 INJECTION, SOLUTION INTRAVENOUS; SUBCUTANEOUS at 17:01

## 2017-12-25 RX ADMIN — Medication 2 UNITS: at 20:12

## 2017-12-25 RX ADMIN — HYDROCODONE BITARTRATE AND ACETAMINOPHEN 1 TABLET: 7.5; 325 TABLET ORAL at 20:10

## 2017-12-25 RX ADMIN — ALPRAZOLAM 0.25 MG: 0.25 TABLET ORAL at 05:58

## 2017-12-25 RX ADMIN — METOPROLOL TARTRATE 50 MG: 50 TABLET, FILM COATED ORAL at 14:51

## 2017-12-25 RX ADMIN — FINASTERIDE 5 MG: 5 TABLET, FILM COATED ORAL at 08:27

## 2017-12-25 RX ADMIN — FAMOTIDINE 20 MG: 10 INJECTION, SOLUTION INTRAVENOUS at 08:33

## 2017-12-25 RX ADMIN — ALPRAZOLAM 0.25 MG: 0.25 TABLET ORAL at 20:10

## 2017-12-25 RX ADMIN — INSULIN LISPRO 12 UNITS: 100 INJECTION, SOLUTION INTRAVENOUS; SUBCUTANEOUS at 11:29

## 2017-12-25 RX ADMIN — FUROSEMIDE 40 MG: 40 TABLET ORAL at 08:26

## 2017-12-25 RX ADMIN — METOPROLOL TARTRATE 50 MG: 50 TABLET, FILM COATED ORAL at 20:10

## 2017-12-25 RX ADMIN — TAMSULOSIN HYDROCHLORIDE 0.4 MG: 0.4 CAPSULE ORAL at 11:13

## 2017-12-25 RX ADMIN — HYDROCODONE BITARTRATE AND ACETAMINOPHEN 1 TABLET: 7.5; 325 TABLET ORAL at 06:38

## 2017-12-25 RX ADMIN — Medication 10 ML: at 20:10

## 2017-12-25 RX ADMIN — HYDRALAZINE HYDROCHLORIDE 20 MG: 20 INJECTION INTRAMUSCULAR; INTRAVENOUS at 08:06

## 2017-12-25 RX ADMIN — AMIODARONE HYDROCHLORIDE 200 MG: 200 TABLET ORAL at 08:26

## 2017-12-25 RX ADMIN — METOPROLOL TARTRATE 50 MG: 50 TABLET, FILM COATED ORAL at 08:26

## 2017-12-25 RX ADMIN — FAMOTIDINE 20 MG: 10 INJECTION, SOLUTION INTRAVENOUS at 20:15

## 2017-12-25 RX ADMIN — INSULIN LISPRO 3 UNITS: 100 INJECTION, SOLUTION INTRAVENOUS; SUBCUTANEOUS at 08:33

## 2017-12-25 RX ADMIN — HYDROCODONE BITARTRATE AND ACETAMINOPHEN 1 TABLET: 7.5; 325 TABLET ORAL at 13:30

## 2017-12-25 RX ADMIN — Medication 10 ML: at 08:33

## 2017-12-25 ASSESSMENT — PAIN SCALES - GENERAL
PAINLEVEL_OUTOF10: 6
PAINLEVEL_OUTOF10: 3
PAINLEVEL_OUTOF10: 7
PAINLEVEL_OUTOF10: 5
PAINLEVEL_OUTOF10: 6
PAINLEVEL_OUTOF10: 5

## 2017-12-25 ASSESSMENT — PAIN DESCRIPTION - LOCATION: LOCATION: BACK

## 2017-12-25 ASSESSMENT — PAIN DESCRIPTION - ORIENTATION: ORIENTATION: LOWER

## 2017-12-25 ASSESSMENT — PAIN DESCRIPTION - PAIN TYPE: TYPE: CHRONIC PAIN

## 2017-12-25 NOTE — PROGRESS NOTES
NEGATIVE 12/20/2017    WBCUA 0-2 12/20/2017    BACTERIA FEW 12/20/2017    RBCUA 10-15 12/20/2017    BLOODU MODERATE 12/20/2017    SPECGRAV >1.030 12/20/2017    GLUCOSEU >= 1000 12/12/2017       Radiology:    Narrative   PROCEDURE: MRI LUMBAR SPINE W WO CONTRAST       CLINICAL INFORMATION: BACK PAIN, UNSPECIFIED,        COMPARISON: Lumbar spine MRI, 19 December 2017; post process lumbar spine reconstruction, 12 December 2017       TECHNIQUE: Using a 1.5 Yuli siemens scanner, sagittal T2-weighted turbospin echo and T2-weighted short tau inversion recovery images of the lumbosacral spine were obtained. Pre-and postcontrast (ProHance, 20 mL) sagittal T1-weighted turbo spin-echo    images were also obtained. Axial T2-weighted turbo spin-echo images were obtained of the intervertebral disc spaces, oriented parallel to the respective endplates, extending from L1-S1, using the last lumbarized vertebra as L5. Pre-and postcontrast axial    T1-weighted turbo spin-echo images were also obtained 2 areas in block fashion, one extending from mid T12 inferiorly through inferior L2, parallel to the L1-2 disc space, and a second from inferior L3 through mid S1, oriented grossly parallel to the    L4-5 disc space.       FINDINGS: This study is degraded by motion artifact.       The alignment of the osseous framework is satisfactory. Better defined on the prior CT, bilateral L5 spondylolysis again evident. No convincing marrow edema. No evidence of fracture.       There are mildly desiccated intervertebral disks diffusely. This space thickness is grossly satisfactory.       The conus medullaris is located at the superior L1 level. There is an intradural extra medullary lesion located along the dorsal aspect of the subcutaneous dural spaces, less likely subarachnoid spaces extending from superior L1 to inferior L1.  There is    secondary deviation of the cauda equina elements with significant compromise of the caudate lobe elements, transverse, 6 mm anterior posterior, 10 mm craniocaudal. T2 signal characteristics of this are near CSF isointense. This small lesion is suspicious for possible aggressive neoplasm, and, combined with the above described lesion, is suspicious for    possible metastatic deposit. Consider the possibility of primitive neural ectodermal tumor (PNET), also, which can be associated with arachnoid enhancement due to CSF spread.       No other evidence of pathologic contrast enhancement.       Images of the thoracic spine reveal no significant canal or foraminal stenosis. Mild degenerative changes are present.       The osseous margins of the canal at L1-2 have only minimally narrowed features due to mild facet degeneration. No focal disc abnormality. No significant foraminal stenosis.       At L2-3, the osseous margins of the canal are minimally narrowed due to slight bulging disc material and mild facet degenerative changes. There is mild foraminal stenosis on the right more than left due to bulging disc material.       At L3-4, the canal is widely patent. Mild facet degeneration on the left more than right. There is bulging disc material extends into the foramina bilaterally producing mild/moderate bilateral foraminal stenosis.       At L4-5, the canal is uncompromised. There is minimal bulging disc material. There is mild facet degeneration. There is mild foraminal stenosis bilaterally.       At L5-S1, the canal and foramina are grossly uncompromised. Mild facet degenerative changes are present.           Impression   NO EVIDENCE OF ACUTE ABNORMALITY.       THERE ARE 2 AREAS OF INTRADURAL EXTRA MEDULLARY LESIONS. THE LARGEST IS LOCATED AT THE L1 LEVEL, 26 MM X 9 MM X 9 MM. VERY MINIMAL PERIPHERAL PATHOLOGIC ENHANCEMENT. THE SECOND IS LOCATED AT THE S1 LEVEL, 7 MM X 6 MM X 10 MM, DEMONSTRATING MINIMAL    HETEROGENEOUS PATHOLOGIC ENHANCEMENT. THERE IS ALSO MILD ENHANCING CHARACTERISTICS OF THE ARACHNOID.  COMBINATION OF Final Result   1. No acute fracture or malalignment. **This report has been created using voice recognition software. It may contain minor errors which are inherent in voice recognition technology. **      Final report electronically signed by Dr. Rajani Azevedo on 12/17/2017 3:18 PM      CT HEAD WO CONTRAST   Final Result   1. No mass effect or acute hemorrhage. 2. Mild chronic periventricular small vessel ischemic changes. **This report has been created using voice recognition software. It may contain minor errors which are inherent in voice recognition technology. **      Final report electronically signed by Dr. Meenakshi Mejia on 12/17/2017 2:44 PM          Diet: Diet NPO Time Specified    Mason: yes - removed 12/22    Microbiology:  Urine cx 12/20 = mixed growth    HIV 12/20 (-)    MRSA (-)    Tele:  SR/SB    DVT prophylaxis: [] Lovenox                                 [x] SCDs                                 [] SQ Heparin                                 [] Encourage ambulation           [x] Already on Anticoagulation- coumadin     Disposition:    [] Home       [] TCU       [] Rehab       [] Psych       [] SNF       [] [place]       [x] Other- ??     Code Status: Full Code    PT/OT Eval Status: consulted      Electronically signed by Emilia Conley MD on 12/25/2017 at 8:21 AM when pt evaluated - final note filed late

## 2017-12-26 ENCOUNTER — APPOINTMENT (OUTPATIENT)
Dept: NON INVASIVE DIAGNOSTICS | Age: 52
DRG: 551 | End: 2017-12-26
Payer: COMMERCIAL

## 2017-12-26 ENCOUNTER — APPOINTMENT (OUTPATIENT)
Dept: MRI IMAGING | Age: 52
DRG: 551 | End: 2017-12-26
Payer: COMMERCIAL

## 2017-12-26 LAB
GLUCOSE BLD-MCNC: 118 MG/DL (ref 70–108)
GLUCOSE BLD-MCNC: 136 MG/DL (ref 70–108)
GLUCOSE BLD-MCNC: 194 MG/DL (ref 70–108)
GLUCOSE BLD-MCNC: 257 MG/DL (ref 70–108)
INR BLD: 1.13 (ref 0.85–1.13)

## 2017-12-26 PROCEDURE — A9500 TC99M SESTAMIBI: HCPCS | Performed by: INTERNAL MEDICINE

## 2017-12-26 PROCEDURE — 1200000003 HC TELEMETRY R&B

## 2017-12-26 PROCEDURE — 6370000000 HC RX 637 (ALT 250 FOR IP): Performed by: FAMILY MEDICINE

## 2017-12-26 PROCEDURE — 6370000000 HC RX 637 (ALT 250 FOR IP): Performed by: INTERNAL MEDICINE

## 2017-12-26 PROCEDURE — 74183 MRI ABD W/O CNTR FLWD CNTR: CPT

## 2017-12-26 PROCEDURE — 6370000000 HC RX 637 (ALT 250 FOR IP): Performed by: SURGERY

## 2017-12-26 PROCEDURE — 6370000000 HC RX 637 (ALT 250 FOR IP): Performed by: NURSE PRACTITIONER

## 2017-12-26 PROCEDURE — 78452 HT MUSCLE IMAGE SPECT MULT: CPT

## 2017-12-26 PROCEDURE — 36415 COLL VENOUS BLD VENIPUNCTURE: CPT

## 2017-12-26 PROCEDURE — 2580000003 HC RX 258: Performed by: SURGERY

## 2017-12-26 PROCEDURE — 99232 SBSQ HOSP IP/OBS MODERATE 35: CPT | Performed by: INTERNAL MEDICINE

## 2017-12-26 PROCEDURE — 82948 REAGENT STRIP/BLOOD GLUCOSE: CPT

## 2017-12-26 PROCEDURE — A9579 GAD-BASE MR CONTRAST NOS,1ML: HCPCS | Performed by: INTERNAL MEDICINE

## 2017-12-26 PROCEDURE — 99232 SBSQ HOSP IP/OBS MODERATE 35: CPT | Performed by: NURSE PRACTITIONER

## 2017-12-26 PROCEDURE — 3430000000 HC RX DIAGNOSTIC RADIOPHARMACEUTICAL: Performed by: INTERNAL MEDICINE

## 2017-12-26 PROCEDURE — 6360000002 HC RX W HCPCS: Performed by: SURGERY

## 2017-12-26 PROCEDURE — 6360000004 HC RX CONTRAST MEDICATION: Performed by: INTERNAL MEDICINE

## 2017-12-26 PROCEDURE — 93017 CV STRESS TEST TRACING ONLY: CPT

## 2017-12-26 PROCEDURE — 6360000002 HC RX W HCPCS

## 2017-12-26 PROCEDURE — 85610 PROTHROMBIN TIME: CPT

## 2017-12-26 RX ORDER — FAMOTIDINE 20 MG/1
20 TABLET, FILM COATED ORAL 2 TIMES DAILY
Status: DISCONTINUED | OUTPATIENT
Start: 2017-12-26 | End: 2017-12-29 | Stop reason: HOSPADM

## 2017-12-26 RX ORDER — HYDRALAZINE HYDROCHLORIDE 25 MG/1
25 TABLET, FILM COATED ORAL EVERY 8 HOURS SCHEDULED
Status: DISCONTINUED | OUTPATIENT
Start: 2017-12-26 | End: 2017-12-29 | Stop reason: HOSPADM

## 2017-12-26 RX ADMIN — INSULIN LISPRO 3 UNITS: 100 INJECTION, SOLUTION INTRAVENOUS; SUBCUTANEOUS at 11:09

## 2017-12-26 RX ADMIN — THIAMINE HCL (VITAMIN B1) 50 MG TABLET 100 MG: at 10:03

## 2017-12-26 RX ADMIN — HYDROMORPHONE HYDROCHLORIDE 1 MG: 1 INJECTION, SOLUTION INTRAMUSCULAR; INTRAVENOUS; SUBCUTANEOUS at 13:30

## 2017-12-26 RX ADMIN — HYDROMORPHONE HYDROCHLORIDE 1 MG: 1 INJECTION, SOLUTION INTRAMUSCULAR; INTRAVENOUS; SUBCUTANEOUS at 01:02

## 2017-12-26 RX ADMIN — FUROSEMIDE 40 MG: 40 TABLET ORAL at 10:03

## 2017-12-26 RX ADMIN — MEXILETINE HYDROCHLORIDE 300 MG: 150 CAPSULE ORAL at 17:15

## 2017-12-26 RX ADMIN — Medication 33 MILLICURIE: at 09:02

## 2017-12-26 RX ADMIN — HYDROCODONE BITARTRATE AND ACETAMINOPHEN 1 TABLET: 7.5; 325 TABLET ORAL at 10:09

## 2017-12-26 RX ADMIN — GADOTERIDOL 20 ML: 279.3 INJECTION, SOLUTION INTRAVENOUS at 15:56

## 2017-12-26 RX ADMIN — Medication 10 ML: at 21:09

## 2017-12-26 RX ADMIN — LINAGLIPTIN 5 MG: 5 TABLET, FILM COATED ORAL at 10:03

## 2017-12-26 RX ADMIN — HYDRALAZINE HYDROCHLORIDE 20 MG: 20 INJECTION INTRAMUSCULAR; INTRAVENOUS at 10:59

## 2017-12-26 RX ADMIN — METOPROLOL TARTRATE 50 MG: 50 TABLET, FILM COATED ORAL at 21:07

## 2017-12-26 RX ADMIN — AMIODARONE HYDROCHLORIDE 200 MG: 200 TABLET ORAL at 10:03

## 2017-12-26 RX ADMIN — FAMOTIDINE 20 MG: 20 TABLET, FILM COATED ORAL at 10:05

## 2017-12-26 RX ADMIN — MEXILETINE HYDROCHLORIDE 300 MG: 150 CAPSULE ORAL at 05:55

## 2017-12-26 RX ADMIN — ALPRAZOLAM 0.25 MG: 0.25 TABLET ORAL at 05:55

## 2017-12-26 RX ADMIN — HYDROMORPHONE HYDROCHLORIDE 1 MG: 1 INJECTION, SOLUTION INTRAMUSCULAR; INTRAVENOUS; SUBCUTANEOUS at 18:48

## 2017-12-26 RX ADMIN — METOPROLOL TARTRATE 50 MG: 50 TABLET, FILM COATED ORAL at 10:05

## 2017-12-26 RX ADMIN — INSULIN LISPRO 9 UNITS: 100 INJECTION, SOLUTION INTRAVENOUS; SUBCUTANEOUS at 17:15

## 2017-12-26 RX ADMIN — Medication 10 ML: at 10:11

## 2017-12-26 RX ADMIN — FINASTERIDE 5 MG: 5 TABLET, FILM COATED ORAL at 10:07

## 2017-12-26 RX ADMIN — METOPROLOL TARTRATE 50 MG: 50 TABLET, FILM COATED ORAL at 17:15

## 2017-12-26 RX ADMIN — ALPRAZOLAM 0.25 MG: 0.25 TABLET ORAL at 21:07

## 2017-12-26 RX ADMIN — HYDROCODONE BITARTRATE AND ACETAMINOPHEN 1 TABLET: 7.5; 325 TABLET ORAL at 16:13

## 2017-12-26 RX ADMIN — HYDRALAZINE HYDROCHLORIDE 25 MG: 25 TABLET, FILM COATED ORAL at 21:09

## 2017-12-26 RX ADMIN — FAMOTIDINE 20 MG: 20 TABLET, FILM COATED ORAL at 21:07

## 2017-12-26 RX ADMIN — HYDRALAZINE HYDROCHLORIDE 25 MG: 25 TABLET, FILM COATED ORAL at 17:14

## 2017-12-26 RX ADMIN — MEXILETINE HYDROCHLORIDE 300 MG: 150 CAPSULE ORAL at 21:07

## 2017-12-26 RX ADMIN — Medication 11 MILLICURIE: at 07:50

## 2017-12-26 RX ADMIN — TAMSULOSIN HYDROCHLORIDE 0.4 MG: 0.4 CAPSULE ORAL at 10:03

## 2017-12-26 ASSESSMENT — PAIN SCALES - GENERAL
PAINLEVEL_OUTOF10: 5
PAINLEVEL_OUTOF10: 4
PAINLEVEL_OUTOF10: 8
PAINLEVEL_OUTOF10: 7
PAINLEVEL_OUTOF10: 8
PAINLEVEL_OUTOF10: 10

## 2017-12-26 ASSESSMENT — PAIN DESCRIPTION - ORIENTATION: ORIENTATION: LOWER

## 2017-12-26 ASSESSMENT — PAIN DESCRIPTION - PAIN TYPE: TYPE: CHRONIC PAIN

## 2017-12-26 ASSESSMENT — PAIN DESCRIPTION - LOCATION: LOCATION: BACK
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
## 2017-12-26 NOTE — PROGRESS NOTES
Neurosurgery Progress Note    Patient:  Obed Storey  YOB: 1965    MRN: [id number]     Acct: [de-identified]     Admit date: 12/17/2017    Patient Seen, Chart, Consults notes, Labs, Radiology studies reviewed. Past, Family, Social History unchanged from admission.     Diet:  DIET CARDIAC; Carb Control: 4 carbs/meal (approximate 1800 kcals/day)    Medications:  Scheduled Meds:   famotidine  20 mg Oral BID    hydrALAZINE  25 mg Oral 3 times per day    potassium (CARDIAC) replacement protocol   Other RX Placeholder    amiodarone  200 mg Oral Daily    furosemide  40 mg Oral Daily    sacubitril-valsartan  1 tablet Oral BID    metoprolol tartrate  50 mg Oral TID    QUEtiapine  100 mg Oral TID    sodium chloride flush  10 mL Intravenous 2 times per day    vitamin B-1  100 mg Oral Daily    ALPRAZolam  0.5 mg Oral Once    ALPRAZolam  0.5 mg Oral Once    insulin lispro  0-18 Units Subcutaneous TID WC    insulin lispro  0-9 Units Subcutaneous Nightly    lidocaine  3 patch Transdermal Daily    nicotine  1 patch Transdermal Daily    linagliptin  5 mg Oral Daily    LORazepam  1 mg Intravenous Once    finasteride  5 mg Oral Daily    mexiletine  300 mg Oral 3 times per day    potassium chloride  20 mEq Oral Once    tamsulosin  0.4 mg Oral Daily     Continuous Infusions:   dextrose 5 % and 0.9 % NaCl       PRN Meds:hydrALAZINE, sodium chloride flush, acetaminophen, ondansetron, HYDROmorphone, metoprolol, LORazepam **OR** LORazepam **OR** LORazepam **OR** LORazepam **OR** LORazepam **OR** LORazepam **OR** LORazepam **OR** LORazepam, glucose, dextrose, glucagon (rDNA), dextrose 5 % and 0.9 % NaCl, ALPRAZolam, HYDROcodone-acetaminophen, nicotine, magnesium hydroxide    Objective:    CBC:   Recent Labs      12/25/17   0616   WBC  8.2   HGB  15.2   PLT  143     BMP:  Recent Labs      12/24/17   0419  12/25/17   0616   NA  140  135   K  3.8  3.7   CL  106  100   CO2  20*  26   BUN  21  18 CREATININE  1.1  1.2   GLUCOSE  209*  185*     Magnesium:No results for input(s): MG in the last 72 hours. Glucose:  Recent Labs      12/25/17 2001 12/26/17   0635  12/26/17   1108   POCGLU  180*  136*  194*     HgbA1C: No results for input(s): LABA1C in the last 72 hours. INR:   Recent Labs      12/26/17   0452   INR  1.13       Physical Exam:  Vitals: BP (!) 164/94   Pulse 62   Temp 98 °F (36.7 °C) (Oral)   Resp 17   Wt 278 lb 1.6 oz (126.1 kg)   SpO2 98%   BMI 35.71 kg/m²   24 hour intake/output:  Intake/Output Summary (Last 24 hours) at 12/26/17 1412  Last data filed at 12/26/17 1334   Gross per 24 hour   Intake              735 ml   Output              750 ml   Net              -15 ml     Last 3 weights: Wt Readings from Last 3 Encounters:   12/26/17 278 lb 1.6 oz (126.1 kg)   12/14/17 279 lb (126.6 kg)   12/12/17 250 lb (113.4 kg)         Neurological   - alert, oriented, normal speech, Pt alert and Oriented x3  - no focal findings or movement disorder noted,   - motor and sensory grossly normal bilaterally  - no bowel or bladder issues  - GCS 15/15  - Back pain is getting better, pt still has shooting pains and spasms at times. He is able to stand for longer periods of time.        Assessment:  Principal Problem:    Intractable back pain  Active Problems:    Paroxysmal atrial fibrillation (HCC)    Hyperlipidemia    Uncontrolled hypertension    Chronic systolic CHF (congestive heart failure) EF 30%([facility])    Uncontrolled type 2 diabetes mellitus with complication, without long-term current use of insulin (HCC)    Delirium    Schwannoma of spinal cord (HCC)    CKD (chronic kidney disease), stage III    Non-traumatic rhabdomyolysis    History of heart bypass surgery    History of placement of internal cardiac defibrillator    Presence of combination internal cardiac defibrillator (ICD) and pacemaker    Metabolic encephalopathy    Accelerated hypertension    Hypernatremia    Metabolic acidosis    BABAK

## 2017-12-26 NOTE — PROGRESS NOTES
sgificant stenosis of LCx s/p CABG -- per cardio -- see above - statin held  16. Ischemic cardiomyopathy with ICD/chronic systolic and diastolic CHF  -- lasix held as of 12/19 -- resumed 12/22 per cardio; Cont BB but changed from IV to po 12/22 per cardio -- On entresto (ARB) and resumed 12/22 per cardio  -- fluid status appears stable -- cXR 12/22 no pulm edema   -- Echo 12/21/17 EF 73%, grade 1 diastolic dysfxn  17. PAF -- po amio, BB -- currently SR -- coumadin held as INR elevated --> INR down to 1.93 on 12/24 --. ?bridge -- cont hold coumadin in case any procedure with above needed. 18. Type 2 DM -- SSI, tradjenta - BS appear stable -- monitor and adjust prn as po intake improves -- hgb A1C 12/17/17 = 9.5 -- home metformin held -- up and down - ??need low dose lantus  19. HLP -- statin held due to rhabdo  20. Hx VT s/p ablation 2014 -- po amio and mexitil -- cardio following - caution with seroquel, haldol, geodon  21. ETOH and polysubstance abuse -- no ETOH level done on admission - UDS + meth/amphetamine -- ?contrib to #1   22. Depression/anxiety -- psych eval done 12/20 - ?still following  23. BPH -- pringle in place 12/22 and removed 12/22 w/o issues with voiding -- proscar  24. Deconditioning -- PT/OT - PMR was consulted 12/20 and unable to eval due to #1 --> ?re- c/s -- cont monitor     dispo    12/26- STRESS TEST TODAY- ONCOLOGY ORDERED MRI PANCREAS T DLINEATE THE MASS- BX WILL BE NEEDED DEPENDING ON RESULTS    12/25--? STRESS TEST ZOYA, ONCOLOGY AND ns WORKING ABT THE BACK LESIONS. AAOX3. dispo per ns and oncology  -- 12/24 --> apprec consultants -- per NS oncology was consulted to eval for ?metastatic process contrib to back masses -- await recs. No BLE weakness or incontinence. Cont pain control. INR down to <2 now and will cont hold coumadin for now until further determination of any procedures with back masses needed - and per cardio ? ? stress test tues -- cont monitor tele, BP, lytes, renal fxn. Output              375 ml   Net                5 ml       Diet:  DIET CARDIAC; Carb Control: 4 carbs/meal (approximate 1800 kcals/day)    Exam:  BP (!) 164/94   Pulse 62   Temp 98 °F (36.7 °C) (Oral)   Resp 17   Wt 278 lb 1.6 oz (126.1 kg)   SpO2 98%   BMI 35.71 kg/m²   RA    General appearance: alert and oriented x 3, NAD  HEENT: Pupils equal, round, and reactive to light. Conjunctivae/corneas clear. Neck: Supple, with full range of motion. No jugular venous distention. Trachea midline. Respiratory:  Normal respiratory effort. Clear to auscultation, bilaterally without Rales/Wheezes/Rhonchi. No respiratory distress or accessory muscle use. Cardiovascular: nicki but regular, 2/6 JOELLE, No rubs or gallops. Abdomen: Soft, non-tender, non-distended with normal bowel sounds. No rebound or guarding  Musculoskeletal: No clubbing, cyanosis or edema bilaterally. Full range of motion without deformity. No calf tenderness palpation  Skin: Skin color, texture, turgor normal.  No rashes or lesions. Neurologic:  Neurovascularly intact without any focal sensory/motor deficits. Cranial nerves: II-XII intact, grossly non-focal.  Psychiatric: alert and oriented x 3, much improved - asking appropriate questions. Capillary Refill: Brisk,< 3 seconds   Peripheral Pulses: +2 palpable, equal bilaterally       Labs:   Recent Labs      12/25/17   0616   WBC  8.2   HGB  15.2   HCT  44.4   PLT  143     Recent Labs      12/24/17   0419  12/25/17   0616   NA  140  135   K  3.8  3.7   CL  106  100   CO2  20*  26   BUN  21  18   CREATININE  1.1  1.2   CALCIUM  8.3*  8.3*     Recent Labs      12/25/17   0616   AST  17   ALT  19   BILITOT  0.5   ALKPHOS  68     Recent Labs      12/24/17   0419  12/25/17   0409  12/26/17   0452   INR  1.93*  1.30*  1.13     No results for input(s): Verito Zhang in the last 72 hours.     Urinalysis:      Lab Results   Component Value Date    NITRU NEGATIVE 12/20/2017    WBCUA 0-2 12/20/2017 craniocaudal. T2 signal characteristics of this are near CSF isointense. This small lesion is suspicious for possible aggressive neoplasm, and, combined with the above described lesion, is suspicious for    possible metastatic deposit. Consider the possibility of primitive neural ectodermal tumor (PNET), also, which can be associated with arachnoid enhancement due to CSF spread.       No other evidence of pathologic contrast enhancement.       Images of the thoracic spine reveal no significant canal or foraminal stenosis. Mild degenerative changes are present.       The osseous margins of the canal at L1-2 have only minimally narrowed features due to mild facet degeneration. No focal disc abnormality. No significant foraminal stenosis.       At L2-3, the osseous margins of the canal are minimally narrowed due to slight bulging disc material and mild facet degenerative changes. There is mild foraminal stenosis on the right more than left due to bulging disc material.       At L3-4, the canal is widely patent. Mild facet degeneration on the left more than right. There is bulging disc material extends into the foramina bilaterally producing mild/moderate bilateral foraminal stenosis.       At L4-5, the canal is uncompromised. There is minimal bulging disc material. There is mild facet degeneration. There is mild foraminal stenosis bilaterally.       At L5-S1, the canal and foramina are grossly uncompromised. Mild facet degenerative changes are present.           Impression   NO EVIDENCE OF ACUTE ABNORMALITY.       THERE ARE 2 AREAS OF INTRADURAL EXTRA MEDULLARY LESIONS. THE LARGEST IS LOCATED AT THE L1 LEVEL, 26 MM X 9 MM X 9 MM. VERY MINIMAL PERIPHERAL PATHOLOGIC ENHANCEMENT. THE SECOND IS LOCATED AT THE S1 LEVEL, 7 MM X 6 MM X 10 MM, DEMONSTRATING MINIMAL    HETEROGENEOUS PATHOLOGIC ENHANCEMENT. THERE IS ALSO MILD ENHANCING CHARACTERISTICS OF THE ARACHNOID.  COMBINATION OF FINDINGS IS CONCERNING FOR POSSIBLE fracture or malalignment. **This report has been created using voice recognition software. It may contain minor errors which are inherent in voice recognition technology. **      Final report electronically signed by Dr. Jose Parry on 12/17/2017 3:18 PM      CT HEAD WO CONTRAST   Final Result   1. No mass effect or acute hemorrhage. 2. Mild chronic periventricular small vessel ischemic changes. **This report has been created using voice recognition software. It may contain minor errors which are inherent in voice recognition technology. **      Final report electronically signed by Dr. Cornell Jaquez on 12/17/2017 2:44 PM          Diet: DIET CARDIAC; Carb Control: 4 carbs/meal (approximate 1800 kcals/day)    Mason: yes - removed 12/22    Microbiology:  Urine cx 12/20 = mixed growth    HIV 12/20 (-)    MRSA (-)    Tele:  SR/SB    DVT prophylaxis: [] Lovenox                                 [x] SCDs                                 [] SQ Heparin                                 [] Encourage ambulation           [x] Already on Anticoagulation- coumadin     Disposition:    [] Home       [] TCU       [] Rehab       [] Psych       [] SNF       [] [place]       [x] Other- ??     Code Status: Full Code    PT/OT Eval Status: consulted      Electronically signed by Francine Hooks MD on 12/26/2017 at 8:21 AM when pt evaluated - final note filed late

## 2017-12-26 NOTE — PROGRESS NOTES
[address] [place] [address]  PHYSICAL THERAPY MISSED TREATMENT NOTE  ACUTE CARE  STRZ RENAL TELEMETRY 6K              Missed Treatment  RN requesting to hold PT at this time for rest. Pt with increased blood pressure- into the 200's, it has decreased some- into the 170's at the time of attempt. Pt also to go down for MRI. Will try back tomorrow.

## 2017-12-26 NOTE — PROGRESS NOTES
Patient refusing to take Seroquel. He states he does not take it anymore and he is not taking it now.

## 2017-12-26 NOTE — CONSULTS
[address] [place] [address]                                   CONSULTATION    PATIENT NAME: ALESSIO SARABIA                      :        1965  MED REC NO:   [id number]                           ROOM:       0017  ACCOUNT NO:   [de-identified]                           ADMIT DATE: 2017  PROVIDER:     JUVENTINO Kurtz DATE:  2017    REFERRING PHYSICIAN:  Dr. Mary Lou Daigle. REASON FOR CONSULTATION:  Lesions on the dura of the spinal cord, rule out  possible malignancy. HISTORY OF PRESENT ILLNESS:  The patient is a 55-year-old man, who  presented to the emergency room with low back pain that radiates to the  back of his thighs and started three days prior to the admission and he was  sent home first on steroids, Flexeril, and pain medications. He did not  improve and he came back to the emergency room. Pain got worse and he was  admitted. He had the MRI of the spine, which showed two dural lesions, one  at L1 and one at S1. Dr. Mary Lou Daigle saw him for neurosurgery. He asked for a  Medical Oncology evaluation for any evidence of malignancy that may have  caused this possible metastasis. I did a CEA and CA 19-9 on him and CEA  was elevated at 9.8 and CA 19-9 is also elevated at 61. PSA was at low at  0.18 and I did the CT scan of the chest, abdomen, and pelvis. Chest CT did  not show evidence of any primary cancer; however, CT of the abdomen showed  a 1.4 x 1.4 cm lesion in the tail of the pancreas, which may be benign or  malignant. Radiologist has recommended either MRI of the pancreas with or  without gadolinium or three-phase CT scan of the chest.  The patient is on  pain medication, Norco, for the back pain. PAST MEDICAL HISTORY:  Significant for acute systolic congestive heart  failure, alcohol abuse, but he said that he stopped drinking in .    Right coronary artery anomalous region; atrial

## 2017-12-27 LAB
EKG ATRIAL RATE: 50 BPM
EKG P AXIS: 0 DEGREES
EKG P-R INTERVAL: 190 MS
EKG Q-T INTERVAL: 494 MS
EKG QRS DURATION: 96 MS
EKG QTC CALCULATION (BAZETT): 450 MS
EKG R AXIS: -4 DEGREES
EKG T AXIS: 106 DEGREES
EKG VENTRICULAR RATE: 50 BPM
GLUCOSE BLD-MCNC: 187 MG/DL (ref 70–108)
GLUCOSE BLD-MCNC: 233 MG/DL (ref 70–108)
GLUCOSE BLD-MCNC: 241 MG/DL (ref 70–108)
GLUCOSE BLD-MCNC: 293 MG/DL (ref 70–108)
INR BLD: 1.01 (ref 0.85–1.13)

## 2017-12-27 PROCEDURE — 99232 SBSQ HOSP IP/OBS MODERATE 35: CPT | Performed by: INTERNAL MEDICINE

## 2017-12-27 PROCEDURE — 6370000000 HC RX 637 (ALT 250 FOR IP): Performed by: INTERNAL MEDICINE

## 2017-12-27 PROCEDURE — 6370000000 HC RX 637 (ALT 250 FOR IP): Performed by: PSYCHIATRY & NEUROLOGY

## 2017-12-27 PROCEDURE — 6370000000 HC RX 637 (ALT 250 FOR IP): Performed by: FAMILY MEDICINE

## 2017-12-27 PROCEDURE — 6370000000 HC RX 637 (ALT 250 FOR IP): Performed by: SURGERY

## 2017-12-27 PROCEDURE — 2580000003 HC RX 258: Performed by: SURGERY

## 2017-12-27 PROCEDURE — 6370000000 HC RX 637 (ALT 250 FOR IP): Performed by: NURSE PRACTITIONER

## 2017-12-27 PROCEDURE — 85610 PROTHROMBIN TIME: CPT

## 2017-12-27 PROCEDURE — 1200000003 HC TELEMETRY R&B

## 2017-12-27 PROCEDURE — 36415 COLL VENOUS BLD VENIPUNCTURE: CPT

## 2017-12-27 PROCEDURE — 93005 ELECTROCARDIOGRAM TRACING: CPT

## 2017-12-27 PROCEDURE — 82948 REAGENT STRIP/BLOOD GLUCOSE: CPT

## 2017-12-27 PROCEDURE — 6360000002 HC RX W HCPCS: Performed by: SURGERY

## 2017-12-27 RX ADMIN — HYDROMORPHONE HYDROCHLORIDE 1 MG: 1 INJECTION, SOLUTION INTRAMUSCULAR; INTRAVENOUS; SUBCUTANEOUS at 17:47

## 2017-12-27 RX ADMIN — HYDROMORPHONE HYDROCHLORIDE 1 MG: 1 INJECTION, SOLUTION INTRAMUSCULAR; INTRAVENOUS; SUBCUTANEOUS at 20:27

## 2017-12-27 RX ADMIN — MEXILETINE HYDROCHLORIDE 300 MG: 150 CAPSULE ORAL at 20:28

## 2017-12-27 RX ADMIN — METOPROLOL TARTRATE 50 MG: 50 TABLET, FILM COATED ORAL at 07:53

## 2017-12-27 RX ADMIN — THIAMINE HCL (VITAMIN B1) 50 MG TABLET 100 MG: at 07:50

## 2017-12-27 RX ADMIN — TAMSULOSIN HYDROCHLORIDE 0.4 MG: 0.4 CAPSULE ORAL at 07:52

## 2017-12-27 RX ADMIN — INSULIN LISPRO 9 UNITS: 100 INJECTION, SOLUTION INTRAVENOUS; SUBCUTANEOUS at 07:44

## 2017-12-27 RX ADMIN — Medication 10 ML: at 20:29

## 2017-12-27 RX ADMIN — FAMOTIDINE 20 MG: 20 TABLET, FILM COATED ORAL at 20:28

## 2017-12-27 RX ADMIN — Medication 3 UNITS: at 22:47

## 2017-12-27 RX ADMIN — METOPROLOL TARTRATE 50 MG: 50 TABLET, FILM COATED ORAL at 13:41

## 2017-12-27 RX ADMIN — ALPRAZOLAM 0.25 MG: 0.25 TABLET ORAL at 16:04

## 2017-12-27 RX ADMIN — FINASTERIDE 5 MG: 5 TABLET, FILM COATED ORAL at 07:50

## 2017-12-27 RX ADMIN — HYDRALAZINE HYDROCHLORIDE 20 MG: 20 INJECTION INTRAMUSCULAR; INTRAVENOUS at 20:34

## 2017-12-27 RX ADMIN — HYDROMORPHONE HYDROCHLORIDE 1 MG: 1 INJECTION, SOLUTION INTRAMUSCULAR; INTRAVENOUS; SUBCUTANEOUS at 04:23

## 2017-12-27 RX ADMIN — INSULIN LISPRO 6 UNITS: 100 INJECTION, SOLUTION INTRAVENOUS; SUBCUTANEOUS at 16:17

## 2017-12-27 RX ADMIN — Medication 10 ML: at 07:53

## 2017-12-27 RX ADMIN — ALPRAZOLAM 0.25 MG: 0.25 TABLET ORAL at 22:45

## 2017-12-27 RX ADMIN — AMIODARONE HYDROCHLORIDE 200 MG: 200 TABLET ORAL at 07:50

## 2017-12-27 RX ADMIN — QUETIAPINE FUMARATE 100 MG: 100 TABLET ORAL at 20:28

## 2017-12-27 RX ADMIN — LINAGLIPTIN 5 MG: 5 TABLET, FILM COATED ORAL at 07:52

## 2017-12-27 RX ADMIN — HYDRALAZINE HYDROCHLORIDE 25 MG: 25 TABLET, FILM COATED ORAL at 06:45

## 2017-12-27 RX ADMIN — MEXILETINE HYDROCHLORIDE 300 MG: 150 CAPSULE ORAL at 13:41

## 2017-12-27 RX ADMIN — FUROSEMIDE 40 MG: 40 TABLET ORAL at 07:52

## 2017-12-27 RX ADMIN — HYDROMORPHONE HYDROCHLORIDE 1 MG: 1 INJECTION, SOLUTION INTRAMUSCULAR; INTRAVENOUS; SUBCUTANEOUS at 16:13

## 2017-12-27 RX ADMIN — HYDRALAZINE HYDROCHLORIDE 25 MG: 25 TABLET, FILM COATED ORAL at 20:28

## 2017-12-27 RX ADMIN — METOPROLOL TARTRATE 50 MG: 50 TABLET, FILM COATED ORAL at 20:28

## 2017-12-27 RX ADMIN — MEXILETINE HYDROCHLORIDE 300 MG: 150 CAPSULE ORAL at 06:45

## 2017-12-27 RX ADMIN — HYDROCODONE BITARTRATE AND ACETAMINOPHEN 1 TABLET: 7.5; 325 TABLET ORAL at 06:48

## 2017-12-27 RX ADMIN — HYDRALAZINE HYDROCHLORIDE 25 MG: 25 TABLET, FILM COATED ORAL at 13:41

## 2017-12-27 RX ADMIN — HYDROMORPHONE HYDROCHLORIDE 1 MG: 1 INJECTION, SOLUTION INTRAMUSCULAR; INTRAVENOUS; SUBCUTANEOUS at 10:36

## 2017-12-27 RX ADMIN — HYDROCODONE BITARTRATE AND ACETAMINOPHEN 1 TABLET: 7.5; 325 TABLET ORAL at 13:44

## 2017-12-27 RX ADMIN — FAMOTIDINE 20 MG: 20 TABLET, FILM COATED ORAL at 07:50

## 2017-12-27 ASSESSMENT — PAIN SCALES - GENERAL
PAINLEVEL_OUTOF10: 5
PAINLEVEL_OUTOF10: 6
PAINLEVEL_OUTOF10: 0
PAINLEVEL_OUTOF10: 5
PAINLEVEL_OUTOF10: 7
PAINLEVEL_OUTOF10: 4
PAINLEVEL_OUTOF10: 8
PAINLEVEL_OUTOF10: 7
PAINLEVEL_OUTOF10: 6

## 2017-12-27 ASSESSMENT — PAIN DESCRIPTION - PAIN TYPE: TYPE: CHRONIC PAIN

## 2017-12-27 ASSESSMENT — PAIN DESCRIPTION - LOCATION: LOCATION: BACK

## 2017-12-27 ASSESSMENT — PAIN DESCRIPTION - ORIENTATION: ORIENTATION: LOWER

## 2017-12-27 NOTE — PLAN OF CARE
Problem: Anxiety/Stress:  Goal: Level of anxiety will decrease  Level of anxiety will decrease   Outcome: Ongoing  PRN ativan given as needed. One dose given today. Patient pacing floor, anxious to see physician for MRI results. Updated him that Dr. Nisa Tello and Dr. Deepali Quezada will be in to talk to him about results and treatment options. Multiple cysts seen on pancreas per MRI. Cassie waiting to see if radiologist can perform a biopsy of the areas in question. Waiting for radiology to assess at this time. Cancer markers elevated at admission. Lesions also found on the spine. Comments: Care plan reviewed with patient and sister. Patient and sister verbalize understanding of the plan of care and contribute to goal setting.

## 2017-12-27 NOTE — PLAN OF CARE
Problem: Pain:  Goal: Pain level will decrease  Pain level will decrease   Outcome: Not Met This Shift  Chronic back pain continues. PRN dilaudid and norco given as ordered. States it helps somewhat. Encouraged to turn and reopsition. Up ad mikal, independent. Problem: Falls - Risk of  Goal: Absence of falls  Outcome: Ongoing  Falling star interventions in place and effective. Bed locked and in lowest position. Call light within reach. Gait steady. Up per self. Refusing bed or chair alarm. Problem: Risk for Impaired Skin Integrity  Goal: Tissue integrity - skin and mucous membranes  Structural intactness and normal physiological function of skin and  mucous membranes. Outcome: Ongoing  Continue to monitor skin for breakdown. No areas of skin impairment noted. Able to turn and reposition self and does so often. Independent. Up to chair multiple times and walks quiñonez. Problem: Discharge Planning:  Goal: Participates in care planning  Participates in care planning   Outcome: Ongoing  To be discharged to home when medically stable. Comments: Care plan reviewed with patient and sister. Patient and sister verbalize understanding of the plan of care and contribute to goal setting.

## 2017-12-27 NOTE — PROGRESS NOTES
[address] [facility]  Hematology/ Oncology Progress Note     Pt Name: Oumou Gomez  MRN: [id number]  [id number]  YOB: 1965  Admit Date: 12/17/2017 12:41 PM  Date of evaluation: 12/27/2017  Primary Care Physician: Kendrick Proctor NP   [id number]       SUBJECTIVE: Very anxious. OBJECTIVE    Physical  VITALS:  BP (!) 168/88   Pulse 57   Temp 98.4 °F (36.9 °C) (Oral)   Resp 18   Ht 6' (1.829 m)   Wt 278 lb 9.6 oz (126.4 kg)   SpO2 97%   BMI 37.78 kg/m²   CONSTITUTIONAL:  awake  EYES:  ENT:  normocepalic, without obvious abnormality  NECK:  supple, symmetrical, trachea midline  HEMATOLOGIC/LYMPHATICS:  no cervical lymphadenopathy and no supraclavicular lymphadenopathy  BACK:  symmetric  LUNGS:  No increased work of breathing, good air exchange, clear to auscultation bilaterally, no crackles or wheezing  CARDIOVASCULAR:  normal apical pulses  ABDOMEN: Soft Non tender.   NEUROLOGIC:  Non Focal.  Data  Labs:  General Labs:    CBC:   Lab Results   Component Value Date    WBC 8.2 12/25/2017    RBC 4.52 12/25/2017    RBC 4.75 11/03/2011    HGB 15.2 12/25/2017    HCT 44.4 12/25/2017    MCV 98.3 12/25/2017    MCH 33.7 12/25/2017    MCHC 34.3 12/25/2017    RDW 14.2 12/25/2017     12/25/2017    MPV 8.8 12/25/2017     CBC with Differential:    Lab Results   Component Value Date    WBC 8.2 12/25/2017    RBC 4.52 12/25/2017    RBC 4.75 11/03/2011    HGB 15.2 12/25/2017    HCT 44.4 12/25/2017     12/25/2017    MCV 98.3 12/25/2017    MCH 33.7 12/25/2017    MCHC 34.3 12/25/2017    RDW 14.2 12/25/2017    NRBC 0 12/25/2017    SEGSPCT 75.7 12/25/2017    MONOPCT 10.6 12/25/2017    MONOSABS 0.9 12/25/2017    LYMPHSABS 0.8 12/25/2017    EOSABS 0.3 12/25/2017    BASOSABS 0.1 12/25/2017     CMP:    Lab Results   Component Value Date     12/25/2017    K 3.7 12/25/2017     12/25/2017    CO2 26 12/25/2017    BUN 18 12/25/2017    CREATININE 1.2 12/25/2017    LABGLOM 63 12/25/2017 GLUCOSE 185 12/25/2017    PROT 5.4 12/25/2017    LABALBU 2.5 12/25/2017    CALCIUM 8.3 12/25/2017    BILITOT 0.5 12/25/2017    ALKPHOS 68 12/25/2017    AST 17 12/25/2017    ALT 19 12/25/2017     BMP:    Lab Results   Component Value Date     12/25/2017    K 3.7 12/25/2017     12/25/2017    CO2 26 12/25/2017    BUN 18 12/25/2017    LABALBU 2.5 12/25/2017    CREATININE 1.2 12/25/2017    CALCIUM 8.3 12/25/2017    LABGLOM 63 12/25/2017    GLUCOSE 185 12/25/2017     Current Medications  Current Facility-Administered Medications: famotidine (PEPCID) tablet 20 mg, 20 mg, Oral, BID  hydrALAZINE (APRESOLINE) tablet 25 mg, 25 mg, Oral, 3 times per day  potassium (CARDIAC) replacement protocol, , Other, RX Placeholder  amiodarone (CORDARONE) tablet 200 mg, 200 mg, Oral, Daily  furosemide (LASIX) tablet 40 mg, 40 mg, Oral, Daily  sacubitril-valsartan (ENTRESTO) 49-51 MG per tablet 1 tablet, 1 tablet, Oral, BID  metoprolol tartrate (LOPRESSOR) tablet 50 mg, 50 mg, Oral, TID  hydrALAZINE (APRESOLINE) injection 20 mg, 20 mg, Intravenous, Q6H PRN  QUEtiapine (SEROQUEL) tablet 100 mg, 100 mg, Oral, TID  sodium chloride flush 0.9 % injection 10 mL, 10 mL, Intravenous, 2 times per day  sodium chloride flush 0.9 % injection 10 mL, 10 mL, Intravenous, PRN  acetaminophen (TYLENOL) tablet 650 mg, 650 mg, Oral, Q4H PRN  ondansetron (ZOFRAN) injection 4 mg, 4 mg, Intravenous, Q6H PRN  HYDROmorphone (DILAUDID) injection 1 mg, 1 mg, Intravenous, Q1H PRN  metoprolol (LOPRESSOR) injection 5 mg, 5 mg, Intravenous, Q6H PRN  vitamin B-1 tablet 100 mg, 100 mg, Oral, Daily  ALPRAZolam (XANAX) tablet 0.5 mg, 0.5 mg, Oral, Once  LORazepam (ATIVAN) tablet 1 mg, 1 mg, Oral, Q1H PRN **OR** LORazepam (ATIVAN) injection 1 mg, 1 mg, Intravenous, Q1H PRN **OR** LORazepam (ATIVAN) tablet 2 mg, 2 mg, Oral, Q1H PRN **OR** LORazepam (ATIVAN) injection 2 mg, 2 mg, Intravenous, Q1H PRN **OR** LORazepam (ATIVAN) tablet 3 mg, 3 mg, Oral, Q1H PRN **OR** LORazepam (ATIVAN) injection 3 mg, 3 mg, Intravenous, Q1H PRN **OR** LORazepam (ATIVAN) tablet 4 mg, 4 mg, Oral, Q1H PRN **OR** LORazepam (ATIVAN) injection 4 mg, 4 mg, Intravenous, Q1H PRN  ALPRAZolam (XANAX) tablet 0.5 mg, 0.5 mg, Oral, Once  glucose (GLUTOSE) 40 % oral gel 15 g, 15 g, Oral, PRN  dextrose 50 % solution 12.5 g, 12.5 g, Intravenous, PRN  glucagon (rDNA) injection 1 mg, 1 mg, Intramuscular, PRN  dextrose 5 % and 0.9 % sodium chloride infusion, 100 mL/hr, Intravenous, PRN  insulin lispro (HUMALOG) injection vial 0-18 Units, 0-18 Units, Subcutaneous, TID WC  insulin lispro (HUMALOG) injection vial 0-9 Units, 0-9 Units, Subcutaneous, Nightly  lidocaine (LIDODERM) 5 % 3 patch, 3 patch, Transdermal, Daily  nicotine (NICODERM CQ) 14 MG/24HR 1 patch, 1 patch, Transdermal, Daily  ALPRAZolam (XANAX) tablet 0.25 mg, 0.25 mg, Oral, TID PRN  HYDROcodone-acetaminophen (NORCO) 7.5-325 MG per tablet 1 tablet, 1 tablet, Oral, Q6H PRN  linagliptin (TRADJENTA) tablet 5 mg, 5 mg, Oral, Daily  nicotine (NICOTROL) inhaler 1 puff, 1 puff, Inhalation, PRN  LORazepam (ATIVAN) injection 1 mg, 1 mg, Intravenous, Once  finasteride (PROSCAR) tablet 5 mg, 5 mg, Oral, Daily  mexiletine (MEXITIL) capsule 300 mg, 300 mg, Oral, 3 times per day  potassium chloride (KLOR-CON M) extended release tablet 20 mEq, 20 mEq, Oral, Once  tamsulosin (FLOMAX) capsule 0.4 mg, 0.4 mg, Oral, Daily  magnesium hydroxide (MILK OF MAGNESIA) 400 MG/5ML suspension 30 mL, 30 mL, Oral, Daily PRN    ASSESSMENT AND PLAN  Patient Active Problem List:     Coronary artery disease involving native coronary artery of native heart without angina pectoris     Depression     Hyperlipidemia     Tobacco abuse     Paroxysmal atrial fibrillation (HCC)     Urinary frequency     Left inguinal pain     Chronic systolic congestive heart failure (HCC)     Elevated troponin     Erectile dysfunction     Hypokalemia     Diabetes type 2, controlled ([facility] [address]     Uncontrolled

## 2017-12-28 LAB
GLUCOSE BLD-MCNC: 129 MG/DL (ref 70–108)
GLUCOSE BLD-MCNC: 206 MG/DL (ref 70–108)
GLUCOSE BLD-MCNC: 246 MG/DL (ref 70–108)
GLUCOSE BLD-MCNC: 97 MG/DL (ref 70–108)
INR BLD: 0.97 (ref 0.85–1.13)

## 2017-12-28 PROCEDURE — 6370000000 HC RX 637 (ALT 250 FOR IP): Performed by: FAMILY MEDICINE

## 2017-12-28 PROCEDURE — 85610 PROTHROMBIN TIME: CPT

## 2017-12-28 PROCEDURE — 99233 SBSQ HOSP IP/OBS HIGH 50: CPT | Performed by: INTERNAL MEDICINE

## 2017-12-28 PROCEDURE — 2580000003 HC RX 258: Performed by: SURGERY

## 2017-12-28 PROCEDURE — 6360000002 HC RX W HCPCS: Performed by: SURGERY

## 2017-12-28 PROCEDURE — 6370000000 HC RX 637 (ALT 250 FOR IP): Performed by: INTERNAL MEDICINE

## 2017-12-28 PROCEDURE — 36415 COLL VENOUS BLD VENIPUNCTURE: CPT

## 2017-12-28 PROCEDURE — 82948 REAGENT STRIP/BLOOD GLUCOSE: CPT

## 2017-12-28 PROCEDURE — 6370000000 HC RX 637 (ALT 250 FOR IP): Performed by: NURSE PRACTITIONER

## 2017-12-28 PROCEDURE — 99232 SBSQ HOSP IP/OBS MODERATE 35: CPT | Performed by: NEUROLOGICAL SURGERY

## 2017-12-28 PROCEDURE — 1200000003 HC TELEMETRY R&B

## 2017-12-28 PROCEDURE — 6370000000 HC RX 637 (ALT 250 FOR IP): Performed by: SURGERY

## 2017-12-28 RX ORDER — FENTANYL 50 UG/H
1 PATCH TRANSDERMAL
Status: DISCONTINUED | OUTPATIENT
Start: 2017-12-28 | End: 2017-12-29 | Stop reason: HOSPADM

## 2017-12-28 RX ADMIN — METOPROLOL TARTRATE 50 MG: 50 TABLET, FILM COATED ORAL at 08:03

## 2017-12-28 RX ADMIN — HYDROMORPHONE HYDROCHLORIDE 1 MG: 1 INJECTION, SOLUTION INTRAMUSCULAR; INTRAVENOUS; SUBCUTANEOUS at 04:19

## 2017-12-28 RX ADMIN — INSULIN LISPRO 6 UNITS: 100 INJECTION, SOLUTION INTRAVENOUS; SUBCUTANEOUS at 11:37

## 2017-12-28 RX ADMIN — TAMSULOSIN HYDROCHLORIDE 0.4 MG: 0.4 CAPSULE ORAL at 08:03

## 2017-12-28 RX ADMIN — FAMOTIDINE 20 MG: 20 TABLET, FILM COATED ORAL at 08:03

## 2017-12-28 RX ADMIN — HYDROMORPHONE HYDROCHLORIDE 1 MG: 1 INJECTION, SOLUTION INTRAMUSCULAR; INTRAVENOUS; SUBCUTANEOUS at 00:20

## 2017-12-28 RX ADMIN — THIAMINE HCL (VITAMIN B1) 50 MG TABLET 100 MG: at 08:03

## 2017-12-28 RX ADMIN — FUROSEMIDE 40 MG: 40 TABLET ORAL at 08:03

## 2017-12-28 RX ADMIN — MEXILETINE HYDROCHLORIDE 300 MG: 150 CAPSULE ORAL at 11:37

## 2017-12-28 RX ADMIN — HYDROMORPHONE HYDROCHLORIDE 1 MG: 1 INJECTION, SOLUTION INTRAMUSCULAR; INTRAVENOUS; SUBCUTANEOUS at 08:03

## 2017-12-28 RX ADMIN — HYDRALAZINE HYDROCHLORIDE 25 MG: 25 TABLET, FILM COATED ORAL at 04:23

## 2017-12-28 RX ADMIN — HYDROMORPHONE HYDROCHLORIDE 1 MG: 1 INJECTION, SOLUTION INTRAMUSCULAR; INTRAVENOUS; SUBCUTANEOUS at 11:45

## 2017-12-28 RX ADMIN — LINAGLIPTIN 5 MG: 5 TABLET, FILM COATED ORAL at 08:03

## 2017-12-28 RX ADMIN — HYDRALAZINE HYDROCHLORIDE 25 MG: 25 TABLET, FILM COATED ORAL at 20:08

## 2017-12-28 RX ADMIN — MEXILETINE HYDROCHLORIDE 300 MG: 150 CAPSULE ORAL at 04:23

## 2017-12-28 RX ADMIN — Medication 10 ML: at 08:11

## 2017-12-28 RX ADMIN — HYDRALAZINE HYDROCHLORIDE 25 MG: 25 TABLET, FILM COATED ORAL at 11:37

## 2017-12-28 RX ADMIN — HYDROMORPHONE HYDROCHLORIDE 1 MG: 1 INJECTION, SOLUTION INTRAMUSCULAR; INTRAVENOUS; SUBCUTANEOUS at 15:07

## 2017-12-28 RX ADMIN — HYDROMORPHONE HYDROCHLORIDE 1 MG: 1 INJECTION, SOLUTION INTRAMUSCULAR; INTRAVENOUS; SUBCUTANEOUS at 18:08

## 2017-12-28 RX ADMIN — FINASTERIDE 5 MG: 5 TABLET, FILM COATED ORAL at 08:11

## 2017-12-28 RX ADMIN — HYDROMORPHONE HYDROCHLORIDE 1 MG: 1 INJECTION, SOLUTION INTRAMUSCULAR; INTRAVENOUS; SUBCUTANEOUS at 10:35

## 2017-12-28 RX ADMIN — AMIODARONE HYDROCHLORIDE 200 MG: 200 TABLET ORAL at 08:03

## 2017-12-28 RX ADMIN — HYDROMORPHONE HYDROCHLORIDE 1 MG: 1 INJECTION, SOLUTION INTRAMUSCULAR; INTRAVENOUS; SUBCUTANEOUS at 16:17

## 2017-12-28 RX ADMIN — METOPROLOL TARTRATE 50 MG: 50 TABLET, FILM COATED ORAL at 11:37

## 2017-12-28 RX ADMIN — MEXILETINE HYDROCHLORIDE 300 MG: 150 CAPSULE ORAL at 20:08

## 2017-12-28 RX ADMIN — HYDROMORPHONE HYDROCHLORIDE 1 MG: 1 INJECTION, SOLUTION INTRAMUSCULAR; INTRAVENOUS; SUBCUTANEOUS at 13:01

## 2017-12-28 RX ADMIN — HYDROMORPHONE HYDROCHLORIDE 1 MG: 1 INJECTION, SOLUTION INTRAMUSCULAR; INTRAVENOUS; SUBCUTANEOUS at 14:02

## 2017-12-28 RX ADMIN — FAMOTIDINE 20 MG: 20 TABLET, FILM COATED ORAL at 20:08

## 2017-12-28 RX ADMIN — INSULIN LISPRO 6 UNITS: 100 INJECTION, SOLUTION INTRAVENOUS; SUBCUTANEOUS at 18:11

## 2017-12-28 RX ADMIN — HYDROMORPHONE HYDROCHLORIDE 1 MG: 1 INJECTION, SOLUTION INTRAMUSCULAR; INTRAVENOUS; SUBCUTANEOUS at 19:25

## 2017-12-28 RX ADMIN — METOPROLOL TARTRATE 50 MG: 50 TABLET, FILM COATED ORAL at 20:08

## 2017-12-28 ASSESSMENT — PAIN SCALES - GENERAL
PAINLEVEL_OUTOF10: 8
PAINLEVEL_OUTOF10: 8
PAINLEVEL_OUTOF10: 3
PAINLEVEL_OUTOF10: 8
PAINLEVEL_OUTOF10: 6
PAINLEVEL_OUTOF10: 8
PAINLEVEL_OUTOF10: 5
PAINLEVEL_OUTOF10: 8
PAINLEVEL_OUTOF10: 8
PAINLEVEL_OUTOF10: 6
PAINLEVEL_OUTOF10: 6
PAINLEVEL_OUTOF10: 8
PAINLEVEL_OUTOF10: 6
PAINLEVEL_OUTOF10: 6
PAINLEVEL_OUTOF10: 8

## 2017-12-28 NOTE — PROGRESS NOTES
WNL  6. Metabolic acidosis -- ?due to Maureen, ? rhabdo -- up and down -- no LA done during admission -- afeb - IVF since 12/19 --> stopped 12/22 and monitor  7. Low grade temp -- 12/21 up to ~100 --Improving 12/23 and afeb 12/24  --> ?atelectasis - doubt DVT as pt on coumadin with elevated INR -- WBC trending back up 12/22 - ?mass on MRI L spine infxn -- ?need ID c/s -- cXR 12/22 (-) infiltrate - ?atelectasis   8. Leukocytosis -- trending up 12/22 to 14 but down to 12 on 12/23  - ?due to steroids given 12/17 - 12/19 --> see #6 -- ?need repeat cultures - ? ID c/s -- CXR 12/22 (-) -- ?related to back mass/schwannoma   9. MAUREEN on CKD stage II -- creat up to 1.9 on 12/19 --> improved to 1.1 on 12/24 --  improving with lasix, entresto held --> monitor as those resumed per cardio 12/22 -- monitor fluid status -- baseline 0.9 - 1.0  10. Rhabdomyolysis  -- POA -- CK trended down with IVF -- cont monitor  11. EKG changes/elevated troponin -- per cardio - no sx - not labeled as MI at this time -- Echo 12/21/17 EF 40%, mod global hypokinesis, grade 1 diastolic dysfxn  -- per cardio note 12/23 plan stress test?? For tues. 12. Accelerated HTN/uncontrolled essential HTN -- cardene gtt stopped 12/22 -- per cardio 12/22 resumed on home lopressor 50 mg bid and stopped IV lopressor 12/22 -- hydralazine 25 4 x day  -- entresto resumed 12/22 per cardio -- cont monitor and adjust prn  13. Lumbar spine severe stenosis -- per MRI 12/22/17 due to extra-medullary lesion -- NS following -- ?plan -- no incontinence or neuro deficits at this time 12/23  14. Coagulopathy due to coumadin/elevated INR -- cont to be elevated 4's 12/22 despite coumadin being held and last dose 12/18 -- LFT unremarkable 12/20 --> repeat 12/23 WNL  -- INR down to 1.93 on 12/24 --> cont hold coumadin in case procedure is needed -- ?need bridging but on for AF  15.  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 Ischemic cardiomyopathy with ICD/chronic systolic and diastolic CHF  -- lasix held as of 12/19 -- resumed 12/22 per cardio; Cont BB but changed from IV to po 12/22 per cardio -- On entresto (ARB) and resumed 12/22 per cardio  -- fluid status appears stable -- cXR 12/22 no pulm edema   -- Echo 12/21/17 EF 04%, grade 1 diastolic dysfxn  17. PAF -- po amio, BB -- currently SR -- coumadin held as INR elevated --> INR down to 1.93 on 12/24 --. ?bridge -- cont hold coumadin in case any procedure with above needed. 18. Type 2 DM -- SSI, tradjenta - BS appear stable -- monitor and adjust prn as po intake improves -- hgb A1C 12/17/17 = 9.5 -- home metformin held -- up and down - ??need low dose lantus  19. HLP -- statin held due to rhabdo  20. Hx VT s/p ablation 2014 -- po amio and mexitil -- cardio following - caution with seroquel, haldol, geodon  21. ETOH and polysubstance abuse -- no ETOH level done on admission - UDS + meth/amphetamine -- ?contrib to #1   22. Depression/anxiety -- psych eval done 12/20 - ?still following  23. BPH -- pringle in place 12/22 and removed 12/22 w/o issues with voiding -- proscar  24. Deconditioning -- PT/OT - PMR was consulted 12/20 and unable to eval due to #1 --> ?re- c/s -- cont monitor     dispo    12/27- stress negative, low EF, pt asking for second opinion at [facility]- told pt and family for second opinion, he can medically discharged- he is stable and he can be reffered as OP to medical oncologist at [facility]. pt will think abt this. Consulted NS for potential bx on the mass on the spinal cord    12/26- STRESS TEST TODAY- ONCOLOGY ORDERED MRI PANCREAS T DLINEATE THE MASS- BX WILL BE NEEDED DEPENDING ON RESULTS    12/25--? STRESS TEST ZOYA, ONCOLOGY AND ns WORKING ABT THE BACK LESIONS.  AAOX3. dispo per ns and oncology  -- 12/24 --> apprec consultants -- per NS oncology was consulted to eval for ?metastatic process LORazepam **OR** LORazepam **OR** LORazepam, glucose, dextrose, glucagon (rDNA), dextrose 5 % and 0.9 % NaCl, ALPRAZolam, HYDROcodone-acetaminophen, nicotine, magnesium hydroxide      Intake/Output Summary (Last 24 hours) at 12/27/17 1910  Last data filed at 12/27/17 1430   Gross per 24 hour   Intake             1080 ml   Output             1000 ml   Net               80 ml       Diet:  DIET CARDIAC; Carb Control: 4 carbs/meal (approximate 1800 kcals/day)    Exam:  BP (!) 168/88   Pulse 57   Temp 98.4 °F (36.9 °C) (Oral)   Resp 18   Ht 6' (1.829 m)   Wt 278 lb 9.6 oz (126.4 kg)   SpO2 97%   BMI 37.78 kg/m²   RA    General appearance: alert and oriented x 3, NAD  HEENT: Pupils equal, round, and reactive to light. Conjunctivae/corneas clear. Neck: Supple, with full range of motion. No jugular venous distention. Trachea midline. Respiratory:  Normal respiratory effort. Clear to auscultation, bilaterally without Rales/Wheezes/Rhonchi. No respiratory distress or accessory muscle use. Cardiovascular: nicki but regular, 2/6 JOELLE, No rubs or gallops. Abdomen: Soft, non-tender, non-distended with normal bowel sounds. No rebound or guarding  Musculoskeletal: No clubbing, cyanosis or edema bilaterally. Full range of motion without deformity. No calf tenderness palpation  Skin: Skin color, texture, turgor normal.  No rashes or lesions. Neurologic:  Neurovascularly intact without any focal sensory/motor deficits. Cranial nerves: II-XII intact, grossly non-focal.  Psychiatric: alert and oriented x 3, much improved - asking appropriate questions.   Capillary Refill: Brisk,< 3 seconds   Peripheral Pulses: +2 palpable, equal bilaterally       Labs:   Recent Labs      12/25/17   0616   WBC  8.2   HGB  15.2   HCT  44.4   PLT  143     Recent Labs      12/25/17   0616   NA  135   K  3.7   CL  100   CO2  26   BUN  18   CREATININE  1.2   CALCIUM  8.3*     Recent Labs      12/25/17   0616   AST  17   ALT  19   BILITOT  0.5 voice recognition software. It may contain minor errors which are inherent in voice recognition technology. **            Final report electronically signed by Dr. Juvenal Samuels on 12/19/2017 2:46 PM      XR SHOULDER RIGHT (MIN 2 VIEWS)   Final Result   1. No acute fracture or malalignment. **This report has been created using voice recognition software. It may contain minor errors which are inherent in voice recognition technology. **      Final report electronically signed by Dr. Ever Goldman on 12/17/2017 3:18 PM      CT HEAD WO CONTRAST   Final Result   1. No mass effect or acute hemorrhage. 2. Mild chronic periventricular small vessel ischemic changes. **This report has been created using voice recognition software. It may contain minor errors which are inherent in voice recognition technology. **      Final report electronically signed by Dr. Diann Rai on 12/17/2017 2:44 PM          Diet: DIET CARDIAC; Carb Control: 4 carbs/meal (approximate 1800 kcals/day)    Mason: yes - removed 12/22    Microbiology:  Urine cx 12/20 = mixed growth    HIV 12/20 (-)    MRSA (-)    Tele:  SR/SB    DVT prophylaxis: [] Lovenox                                 [x] SCDs                                 [] SQ Heparin                                 [] Encourage ambulation           [x] Already on Anticoagulation- coumadin     Disposition:    [] Home       [] TCU       [] Rehab       [] Psych       [] SNF       [] [place]       [x] Other- ??     Code Status: Full Code    PT/OT Eval Status: consulted      Electronically signed by Marilin Grimaldo MD on 12/27/2017 at 8:21 AM when pt evaluated - final note filed late

## 2017-12-28 NOTE — PROGRESS NOTES
[facility]  PHYSICAL THERAPY MISSED TREATMENT NOTE  ACUTE CARE  [facility] RENAL TELEMETRY 6K              Missed Treatment  Pt. Continues to refuse therapy on second attempt this date.

## 2017-12-28 NOTE — PROGRESS NOTES
EOSABS 0.3 12/25/2017    BASOSABS 0.1 12/25/2017     CMP:    Lab Results   Component Value Date     12/25/2017    K 3.7 12/25/2017     12/25/2017    CO2 26 12/25/2017    BUN 18 12/25/2017    CREATININE 1.2 12/25/2017    LABGLOM 63 12/25/2017    GLUCOSE 185 12/25/2017    PROT 5.4 12/25/2017    LABALBU 2.5 12/25/2017    CALCIUM 8.3 12/25/2017    BILITOT 0.5 12/25/2017    ALKPHOS 68 12/25/2017    AST 17 12/25/2017    ALT 19 12/25/2017     BMP:    Lab Results   Component Value Date     12/25/2017    K 3.7 12/25/2017     12/25/2017    CO2 26 12/25/2017    BUN 18 12/25/2017    LABALBU 2.5 12/25/2017    CREATININE 1.2 12/25/2017    CALCIUM 8.3 12/25/2017    LABGLOM 63 12/25/2017    GLUCOSE 185 12/25/2017     Current Medications  Current Facility-Administered Medications: famotidine (PEPCID) tablet 20 mg, 20 mg, Oral, BID  hydrALAZINE (APRESOLINE) tablet 25 mg, 25 mg, Oral, 3 times per day  potassium (CARDIAC) replacement protocol, , Other, RX Placeholder  amiodarone (CORDARONE) tablet 200 mg, 200 mg, Oral, Daily  furosemide (LASIX) tablet 40 mg, 40 mg, Oral, Daily  sacubitril-valsartan (ENTRESTO) 49-51 MG per tablet 1 tablet, 1 tablet, Oral, BID  metoprolol tartrate (LOPRESSOR) tablet 50 mg, 50 mg, Oral, TID  hydrALAZINE (APRESOLINE) injection 20 mg, 20 mg, Intravenous, Q6H PRN  sodium chloride flush 0.9 % injection 10 mL, 10 mL, Intravenous, 2 times per day  sodium chloride flush 0.9 % injection 10 mL, 10 mL, Intravenous, PRN  acetaminophen (TYLENOL) tablet 650 mg, 650 mg, Oral, Q4H PRN  ondansetron (ZOFRAN) injection 4 mg, 4 mg, Intravenous, Q6H PRN  HYDROmorphone (DILAUDID) injection 1 mg, 1 mg, Intravenous, Q1H PRN  metoprolol (LOPRESSOR) injection 5 mg, 5 mg, Intravenous, Q6H PRN  vitamin B-1 tablet 100 mg, 100 mg, Oral, Daily  ALPRAZolam (XANAX) tablet 0.5 mg, 0.5 mg, Oral, Once  LORazepam (ATIVAN) tablet 1 mg, 1 mg, Oral, Q1H PRN **OR** LORazepam (ATIVAN) injection 1 mg, 1 mg, Intravenous, Q1H PRN **OR** LORazepam (ATIVAN) tablet 2 mg, 2 mg, Oral, Q1H PRN **OR** LORazepam (ATIVAN) injection 2 mg, 2 mg, Intravenous, Q1H PRN **OR** LORazepam (ATIVAN) tablet 3 mg, 3 mg, Oral, Q1H PRN **OR** LORazepam (ATIVAN) injection 3 mg, 3 mg, Intravenous, Q1H PRN **OR** LORazepam (ATIVAN) tablet 4 mg, 4 mg, Oral, Q1H PRN **OR** LORazepam (ATIVAN) injection 4 mg, 4 mg, Intravenous, Q1H PRN  ALPRAZolam (XANAX) tablet 0.5 mg, 0.5 mg, Oral, Once  glucose (GLUTOSE) 40 % oral gel 15 g, 15 g, Oral, PRN  dextrose 50 % solution 12.5 g, 12.5 g, Intravenous, PRN  glucagon (rDNA) injection 1 mg, 1 mg, Intramuscular, PRN  dextrose 5 % and 0.9 % sodium chloride infusion, 100 mL/hr, Intravenous, PRN  insulin lispro (HUMALOG) injection vial 0-18 Units, 0-18 Units, Subcutaneous, TID WC  insulin lispro (HUMALOG) injection vial 0-9 Units, 0-9 Units, Subcutaneous, Nightly  lidocaine (LIDODERM) 5 % 3 patch, 3 patch, Transdermal, Daily  nicotine (NICODERM CQ) 14 MG/24HR 1 patch, 1 patch, Transdermal, Daily  ALPRAZolam (XANAX) tablet 0.25 mg, 0.25 mg, Oral, TID PRN  HYDROcodone-acetaminophen (NORCO) 7.5-325 MG per tablet 1 tablet, 1 tablet, Oral, Q6H PRN  linagliptin (TRADJENTA) tablet 5 mg, 5 mg, Oral, Daily  nicotine (NICOTROL) inhaler 1 puff, 1 puff, Inhalation, PRN  LORazepam (ATIVAN) injection 1 mg, 1 mg, Intravenous, Once  finasteride (PROSCAR) tablet 5 mg, 5 mg, Oral, Daily  mexiletine (MEXITIL) capsule 300 mg, 300 mg, Oral, 3 times per day  potassium chloride (KLOR-CON M) extended release tablet 20 mEq, 20 mEq, Oral, Once  tamsulosin (FLOMAX) capsule 0.4 mg, 0.4 mg, Oral, Daily  magnesium hydroxide (MILK OF MAGNESIA) 400 MG/5ML suspension 30 mL, 30 mL, Oral, Daily PRN    ASSESSMENT AND PLAN  Patient Active Problem List:     Coronary artery disease involving native coronary artery of native heart without angina pectoris     Depression     Hyperlipidemia     Tobacco abuse     Paroxysmal atrial fibrillation (HCC)     Urinary

## 2017-12-28 NOTE — PROGRESS NOTES
Neurosurgery progress note    Covering Dr. Mehul Blair    Patient was seen and examined. His back pain is better in general  There this no weakness or numbness in his legs. No issue in controlling bladder or bowel control. L-spine MRI (performed on 12/22/2017): THERE ARE 2 AREAS OF INTRADURAL EXTRA MEDULLARY LESIONS. THE LARGEST IS LOCATED AT THE L1 LEVEL, 26 MM X 9 MM X 9 MM. VERY MINIMAL PERIPHERAL PATHOLOGIC ENHANCEMENT. THE SECOND IS LOCATED AT THE S1 LEVEL, 7 MM X 6 MM X 10 MM, DEMONSTRATING MINIMAL    HETEROGENEOUS PATHOLOGIC ENHANCEMENT. THERE IS ALSO MILD ENHANCING CHARACTERISTICS OF THE ARACHNOID. COMBINATION OF FINDINGS IS CONCERNING FOR POSSIBLE METASTATIC LESIONS WITH CSF SPREAD. PRIMITIVE NEURAL ECTODERMAL TUMOR (PNET) IS ANOTHER POSSIBLE    EXPLANATION FOR THE COMBINATION OF FINDINGS. 2 SEPARATE LESIONS OF DIFFERENT PATHOLOGY IS FELT UNLIKELY THOUGH POSSIBLY CONSIDER. THE LARGEST LESION, LOCATED AT THE L1 LEVEL, PRODUCES SECONDARY SEVERE STENOSIS OF THE CONTENTS OF THE THECAL SAC. NO    SIGNIFICANT STENOSIS PRODUCED BY THE SMALLER LESION. -MRI of the abdomen showed:    1. There is a 1.2 cm cystic mass within the pancreatic tail with a low T2 weighted signal rim. There is no soft tissue component or enhancement. There is no distention of the pancreatic duct. The differential includes side branch intraductal papillary    mucinous neoplasm, mucinous tumor and pseudocyst if there is a history of pancreatitis. A follow-up MRI of the pancreas in 6 months is recommended to confirm stability. 2. The pancreatic duct at the level of the pancreatic head is not well visualized however there is suspicion for pancreatic disease and on the source MRCP images. 3. There is heterogeneous intermediate decreased T2 weighted signal within the gallbladder which could represent sludge. There are no low signal foci within the gallbladder to suggest gallstones.  There is no distention of the gallbladder or thickening of    the gallbladder wall. There is a small amount of fluid adjacent to the scheduled there is a small amount of pericholecystic fluid. There is no biliary dilatation. 4. There is mild splenomegaly measuring 13.8 x 5.7 x 15.5 cm in longitudinal, transverse and AP dimensions. 5. There is stable adrenal hypertrophy, left greater than right with diffuse signal dropout on the T1 out of phase gradient images. There is no focal adrenal mass. 6. There are several Bosniak 1 left renal cysts, largest measuring 2.3 cm. There is a moderate amount of edema within the left perinephric space. The left kidney is otherwise unremarkable. 7. There are Bosniak 1 right renal cysts, largest measuring 1.6 cm. There is a moderate amount of edema within the right perinephric space. The right kidney is otherwise unremarkable. 8. There is a small amount of free fluid adjacent to the spleen. 9. There is mild pleural reaction at the right lateral costophrenic angle. A/P:    -46year old M with back pain and with lumbar intradural extramedullary lesions.    - As till now there is no  conclusive answer regarding the nature of patient's intradural spinal lesions. My recommendations now is to perform brain MRI to rule out  the possibility of drop mets. Then, to perform high volume LP to obtain a CSF for cytology tests. - I discussed the case with medical oncology team and our hospitalist team and they are in agreement with this plan.

## 2017-12-29 ENCOUNTER — TELEPHONE (OUTPATIENT)
Dept: FAMILY MEDICINE CLINIC | Age: 52
End: 2017-12-29

## 2017-12-29 VITALS
DIASTOLIC BLOOD PRESSURE: 87 MMHG | HEIGHT: 72 IN | HEART RATE: 62 BPM | OXYGEN SATURATION: 97 % | SYSTOLIC BLOOD PRESSURE: 152 MMHG | RESPIRATION RATE: 20 BRPM | BODY MASS INDEX: 37.73 KG/M2 | WEIGHT: 278.6 LBS | TEMPERATURE: 98.1 F

## 2017-12-29 LAB
GLUCOSE BLD-MCNC: 142 MG/DL (ref 70–108)
GLUCOSE BLD-MCNC: 281 MG/DL (ref 70–108)
INR BLD: 0.98 (ref 0.85–1.13)

## 2017-12-29 PROCEDURE — 6370000000 HC RX 637 (ALT 250 FOR IP): Performed by: FAMILY MEDICINE

## 2017-12-29 PROCEDURE — 6370000000 HC RX 637 (ALT 250 FOR IP): Performed by: NURSE PRACTITIONER

## 2017-12-29 PROCEDURE — 36415 COLL VENOUS BLD VENIPUNCTURE: CPT

## 2017-12-29 PROCEDURE — 99239 HOSP IP/OBS DSCHRG MGMT >30: CPT | Performed by: INTERNAL MEDICINE

## 2017-12-29 PROCEDURE — 85610 PROTHROMBIN TIME: CPT

## 2017-12-29 PROCEDURE — 6370000000 HC RX 637 (ALT 250 FOR IP): Performed by: INTERNAL MEDICINE

## 2017-12-29 PROCEDURE — 82948 REAGENT STRIP/BLOOD GLUCOSE: CPT

## 2017-12-29 PROCEDURE — 6360000002 HC RX W HCPCS: Performed by: SURGERY

## 2017-12-29 PROCEDURE — 6370000000 HC RX 637 (ALT 250 FOR IP): Performed by: SURGERY

## 2017-12-29 PROCEDURE — 6360000002 HC RX W HCPCS: Performed by: INTERNAL MEDICINE

## 2017-12-29 RX ORDER — TRAMADOL HYDROCHLORIDE 50 MG/1
50 TABLET ORAL EVERY 6 HOURS PRN
Qty: 10 TABLET | Refills: 0 | Status: SHIPPED | OUTPATIENT
Start: 2017-12-29 | End: 2018-01-04 | Stop reason: ALTCHOICE

## 2017-12-29 RX ORDER — GLIMEPIRIDE 4 MG/1
4 TABLET ORAL
Status: DISCONTINUED | OUTPATIENT
Start: 2017-12-29 | End: 2017-12-29 | Stop reason: HOSPADM

## 2017-12-29 RX ORDER — HYDROCODONE BITARTRATE AND ACETAMINOPHEN 7.5; 325 MG/1; MG/1
1 TABLET ORAL EVERY 6 HOURS PRN
Qty: 10 TABLET | Refills: 0 | Status: SHIPPED | OUTPATIENT
Start: 2017-12-29 | End: 2018-01-04 | Stop reason: SDUPTHER

## 2017-12-29 RX ORDER — GLIMEPIRIDE 4 MG/1
4 TABLET ORAL
Qty: 30 TABLET | Refills: 3 | Status: ON HOLD | OUTPATIENT
Start: 2017-12-29 | End: 2018-01-23

## 2017-12-29 RX ORDER — HYDRALAZINE HYDROCHLORIDE 25 MG/1
25 TABLET, FILM COATED ORAL EVERY 8 HOURS SCHEDULED
Qty: 90 TABLET | Refills: 3 | Status: SHIPPED | OUTPATIENT
Start: 2017-12-29 | End: 2018-02-05 | Stop reason: SDUPTHER

## 2017-12-29 RX ADMIN — THIAMINE HCL (VITAMIN B1) 50 MG TABLET 100 MG: at 09:13

## 2017-12-29 RX ADMIN — AMIODARONE HYDROCHLORIDE 200 MG: 200 TABLET ORAL at 09:13

## 2017-12-29 RX ADMIN — HYDRALAZINE HYDROCHLORIDE 20 MG: 20 INJECTION INTRAMUSCULAR; INTRAVENOUS at 12:28

## 2017-12-29 RX ADMIN — LINAGLIPTIN 5 MG: 5 TABLET, FILM COATED ORAL at 09:13

## 2017-12-29 RX ADMIN — HYDRALAZINE HYDROCHLORIDE 25 MG: 25 TABLET, FILM COATED ORAL at 12:34

## 2017-12-29 RX ADMIN — MEXILETINE HYDROCHLORIDE 300 MG: 150 CAPSULE ORAL at 04:35

## 2017-12-29 RX ADMIN — METOPROLOL TARTRATE 50 MG: 50 TABLET, FILM COATED ORAL at 12:28

## 2017-12-29 RX ADMIN — HYDROMORPHONE HYDROCHLORIDE 1 MG: 1 INJECTION, SOLUTION INTRAMUSCULAR; INTRAVENOUS; SUBCUTANEOUS at 16:16

## 2017-12-29 RX ADMIN — METOPROLOL TARTRATE 50 MG: 50 TABLET, FILM COATED ORAL at 09:13

## 2017-12-29 RX ADMIN — INSULIN LISPRO 3 UNITS: 100 INJECTION, SOLUTION INTRAVENOUS; SUBCUTANEOUS at 09:13

## 2017-12-29 RX ADMIN — HYDROCODONE BITARTRATE AND ACETAMINOPHEN 1 TABLET: 7.5; 325 TABLET ORAL at 13:36

## 2017-12-29 RX ADMIN — HYDRALAZINE HYDROCHLORIDE 25 MG: 25 TABLET, FILM COATED ORAL at 04:35

## 2017-12-29 RX ADMIN — ALPRAZOLAM 0.25 MG: 0.25 TABLET ORAL at 09:13

## 2017-12-29 RX ADMIN — FUROSEMIDE 40 MG: 40 TABLET ORAL at 09:13

## 2017-12-29 RX ADMIN — TAMSULOSIN HYDROCHLORIDE 0.4 MG: 0.4 CAPSULE ORAL at 09:13

## 2017-12-29 RX ADMIN — FINASTERIDE 5 MG: 5 TABLET, FILM COATED ORAL at 09:13

## 2017-12-29 RX ADMIN — INSULIN LISPRO 9 UNITS: 100 INJECTION, SOLUTION INTRAVENOUS; SUBCUTANEOUS at 12:28

## 2017-12-29 RX ADMIN — FAMOTIDINE 20 MG: 20 TABLET, FILM COATED ORAL at 09:13

## 2017-12-29 RX ADMIN — HYDRALAZINE HYDROCHLORIDE 20 MG: 20 INJECTION INTRAMUSCULAR; INTRAVENOUS at 09:13

## 2017-12-29 RX ADMIN — MEXILETINE HYDROCHLORIDE 300 MG: 150 CAPSULE ORAL at 12:28

## 2017-12-29 RX ADMIN — HYDROCODONE BITARTRATE AND ACETAMINOPHEN 1 TABLET: 7.5; 325 TABLET ORAL at 07:23

## 2017-12-29 RX ADMIN — HYDROMORPHONE HYDROCHLORIDE 1 MG: 1 INJECTION, SOLUTION INTRAMUSCULAR; INTRAVENOUS; SUBCUTANEOUS at 04:38

## 2017-12-29 ASSESSMENT — PAIN SCALES - GENERAL
PAINLEVEL_OUTOF10: 8
PAINLEVEL_OUTOF10: 6
PAINLEVEL_OUTOF10: 7
PAINLEVEL_OUTOF10: 5
PAINLEVEL_OUTOF10: 2
PAINLEVEL_OUTOF10: 5
PAINLEVEL_OUTOF10: 5

## 2017-12-29 NOTE — FLOWSHEET NOTE
12/20/17 0915   Vital Signs   BP (!) 218/91  (pt moving arms while taking)   BP Location Left lower arm   BP Upper/Lower Lower   Dr. Daniel Turner updated on high blood pressure. Hydralazine 10mg IV was given as ordered.
12/29/17 1207   Encounter Summary   Services provided to: Patient   Referral/Consult From: [id number] Family members   [facility] None   Continue Visiting Yes  (12/29/17 New Diagnosis, emotional support)   Complexity of Encounter Moderate   Length of Encounter 15 minutes   Spiritual Assessment Completed Yes   Routine   Type Follow up   Assessment Approachable;Fearful; Anxious   Intervention Active listening;Nurtured hope;Prayer   Outcome Expressed gratitude   Spiritual/Buddhist   Type Spiritual support      visited patient for a follow up visit for spiritual and emotional support. At the time of arrival,  saw patient sitting in the recliner chair with his regular dress clothe on but had on heart monitors hanging on him. There were no family members available. Patient immediately stated that he he had a MRI done on his brain and spinal tab they found a spot that is a mass. He also said that a  New biopsy is being put in place to see if the mass has spread to other part of the body. I told patient I was sorry to hear about the situation he is facing at this time. I also told patient that we are hear to provide both emotional and spiritual support. I asked patient how his heriberto was guiding him through his illness? Patient told me he believe in [employer] but do not have a Evangelical home. He said that he is receiving good support from his sister who is the only one patient said he has at this time. Patient also told me that he has several friends whom he will be calling to pray for him. I offered prayer for strength and healing of he body, mind and spirit for patient and his family. Will follow up for continue emotional and spiritual support.
Patient belongings packed. Clothes, wallet, cell phone, , reading glasses, and footwear collected and sent with patient. Patient transferred to [facility] via wheelchair.
Patient remains confused and agitated. However speech is more appropriate at times and more clear. Remains on precedex.
Returned to room. To chair. Denies any needs.

## 2017-12-29 NOTE — PROGRESS NOTES
and hospitalist service was asked to resume care. --  CT Head that showed NAP(12/17/17), CT Lumbar (12/12/17) showed Rt L5 pars defect without spondylolisthesis, mild annular bulges from L2-3 through L5-S1 with mild to moderate spinal stenosis, and possible impingement upon the Rt L2 nerve root at  L2-3. 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 Repeat MRI lumbar spine 12/22/17 with 2 intradural extra-medullary lesions and one causes severe stenosis and has concerning enhancement for ?metastatic dz --> per NS Dr. Maritza Zee consulted oncology to assist with r/o malignancy.   -- was on cardene gtt for HTN - stopped 12/22 and po meds adjusted by cardio  -- cardiology consulted for EKG changes, elevated troponin, significant cardiac hx and plan stress test tues    Subjective:   -- 12/29/2017  -->doing ok, walking and ambulating  back pain better, anxious, spoke in detail abt ongoing plan, today mri    Medications:  Reviewed    Infusion Medications    dextrose 5 % and 0.9 % NaCl       Scheduled Medications    glimepiride  4 mg Oral Daily with breakfast    fentaNYL  1 patch Transdermal Q72H    famotidine  20 mg Oral BID    hydrALAZINE  25 mg Oral 3 times per day    potassium (CARDIAC) replacement protocol   Other RX Placeholder    amiodarone  200 mg Oral Daily    furosemide  40 mg Oral Daily    sacubitril-valsartan  1 tablet Oral BID    metoprolol tartrate  50 mg Oral TID    sodium chloride flush  10 mL Intravenous 2 times per day    vitamin B-1  100 mg Oral Daily    ALPRAZolam  0.5 mg Oral Once    ALPRAZolam  0.5 mg Oral Once    insulin lispro  0-18 Units Subcutaneous TID WC    insulin lispro  0-9 Units Subcutaneous Nightly    lidocaine  3 patch Transdermal Daily    nicotine  1 patch Transdermal Daily    linagliptin  5 mg Oral Daily    LORazepam  1 mg Intravenous Once    finasteride  5 mg Oral Daily    mexiletine  300 mg Oral 3 times per day    potassium chloride  20 mEq Oral Once    tamsulosin  0.4 mg Oral Daily     PRN Meds: HYDROmorphone, hydrALAZINE, sodium chloride flush, acetaminophen, ondansetron, metoprolol, LORazepam **OR** LORazepam **OR** LORazepam **OR** LORazepam **OR** LORazepam **OR** LORazepam **OR** LORazepam **OR** LORazepam, glucose, dextrose, glucagon (rDNA), dextrose 5 % and 0.9 % NaCl, ALPRAZolam, HYDROcodone-acetaminophen, nicotine, magnesium hydroxide      Intake/Output Summary (Last 24 hours) at 12/29/17 1215  Last data filed at 12/29/17 0900   Gross per 24 hour   Intake             1500 ml   Output                0 ml   Net             1500 ml       Diet:  DIET CARDIAC; Carb Control: 4 carbs/meal (approximate 1800 kcals/day)    Exam:  BP (!) 172/96   Pulse 62   Temp 98.1 °F (36.7 °C) (Oral)   Resp 20   Ht 6' (1.829 m)   Wt 278 lb 9.6 oz (126.4 kg)   SpO2 97%   BMI 37.78 kg/m²   RA    General appearance: alert and oriented x 3, NAD  HEENT: Pupils equal, round, and reactive to light. Conjunctivae/corneas clear. Neck: Supple, with full range of motion. No jugular venous distention. Trachea midline. Respiratory:  Normal respiratory effort. Clear to auscultation, bilaterally without Rales/Wheezes/Rhonchi. No respiratory distress or accessory muscle use. Cardiovascular: nicki but regular, 2/6 JOELLE, No rubs or gallops. Abdomen: Soft, non-tender, non-distended with normal bowel sounds. No rebound or guarding  Musculoskeletal: No clubbing, cyanosis or edema bilaterally. Full range of motion without deformity. No calf tenderness palpation  Skin: Skin color, texture, turgor normal.  No rashes or lesions. Neurologic:  Neurovascularly intact without any focal sensory/motor deficits.  Cranial nerves: II-XII intact, grossly non-focal.  Psychiatric: alert and oriented x 3, much improved consulted      Electronically signed by Humza Tarango MD on 12/29/2017 at 8:21 AM when pt evaluated - final note filed late

## 2017-12-29 NOTE — PROGRESS NOTES
input(s): WBC, HGB, HCT, PLT in the last 72 hours. No results for input(s): NA, K, CL, CO2, BUN, CREATININE, CALCIUM, PHOS in the last 72 hours. Invalid input(s): MAGNES  No results for input(s): AST, ALT, BILIDIR, BILITOT, ALKPHOS in the last 72 hours. Recent Labs      12/26/17   0452  12/27/17   0540  12/28/17   0510   INR  1.13  1.01  0.97     No results for input(s): Eran Hem in the last 72 hours. Urinalysis:      Lab Results   Component Value Date    NITRU NEGATIVE 12/20/2017    WBCUA 0-2 12/20/2017    BACTERIA FEW 12/20/2017    RBCUA 10-15 12/20/2017    BLOODU MODERATE 12/20/2017    SPECGRAV >1.030 12/20/2017    GLUCOSEU >= 1000 12/12/2017       Radiology:    Narrative   PROCEDURE: MRI LUMBAR SPINE W WO CONTRAST       CLINICAL INFORMATION: BACK PAIN, UNSPECIFIED,        COMPARISON: Lumbar spine MRI, 19 December 2017; post process lumbar spine reconstruction, 12 December 2017       TECHNIQUE: Using a 1.5 Yuli siemens scanner, sagittal T2-weighted turbospin echo and T2-weighted short tau inversion recovery images of the lumbosacral spine were obtained. Pre-and postcontrast (ProHance, 20 mL) sagittal T1-weighted turbo spin-echo    images were also obtained. Axial T2-weighted turbo spin-echo images were obtained of the intervertebral disc spaces, oriented parallel to the respective endplates, extending from L1-S1, using the last lumbarized vertebra as L5. Pre-and postcontrast axial    T1-weighted turbo spin-echo images were also obtained 2 areas in block fashion, one extending from mid T12 inferiorly through inferior L2, parallel to the L1-2 disc space, and a second from inferior L3 through mid S1, oriented grossly parallel to the    L4-5 disc space.       FINDINGS: This study is degraded by motion artifact.       The alignment of the osseous framework is satisfactory. Better defined on the prior CT, bilateral L5 spondylolysis again evident. No convincing marrow edema. No evidence of fracture. possible explanation.       Satisfactory appearance of the imaged portions of the spinal cord. Postcontrast images reveal some areas of minimal enhancing leptomeningeal services, consistent with minimal arachnoiditis.       There is a new area of poorly defined pathologic enhancing intradural extra medullary structure at the S1 level. At this level, there is a lobular area of mild pathologically minimally heterogeneous enhancing tissue, measuring approximately 7 mm    transverse, 6 mm anterior posterior, 10 mm craniocaudal. T2 signal characteristics of this are near CSF isointense. This small lesion is suspicious for possible aggressive neoplasm, and, combined with the above described lesion, is suspicious for    possible metastatic deposit. Consider the possibility of primitive neural ectodermal tumor (PNET), also, which can be associated with arachnoid enhancement due to CSF spread.       No other evidence of pathologic contrast enhancement.       Images of the thoracic spine reveal no significant canal or foraminal stenosis. Mild degenerative changes are present.       The osseous margins of the canal at L1-2 have only minimally narrowed features due to mild facet degeneration. No focal disc abnormality. No significant foraminal stenosis.       At L2-3, the osseous margins of the canal are minimally narrowed due to slight bulging disc material and mild facet degenerative changes. There is mild foraminal stenosis on the right more than left due to bulging disc material.       At L3-4, the canal is widely patent. Mild facet degeneration on the left more than right. There is bulging disc material extends into the foramina bilaterally producing mild/moderate bilateral foraminal stenosis.       At L4-5, the canal is uncompromised. There is minimal bulging disc material. There is mild facet degeneration. There is mild foraminal stenosis bilaterally.       At L5-S1, the canal and foramina are grossly uncompromised.  Mild

## 2017-12-29 NOTE — DISCHARGE SUMMARY
monitor  5. Hypokalemia -- 12/22 -- replace and monitor -- Mg 12/22 WNL  6. Metabolic acidosis -- ?due to Maureen, ? rhabdo -- up and down -- no LA done during admission -- afeb - IVF since 12/19 --> stopped 12/22 and monitor  7. Low grade temp -- 12/21 up to ~100 --Improving 12/23 and afeb 12/24  --> ?atelectasis - doubt DVT as pt on coumadin with elevated INR -- WBC trending back up 12/22 - ?mass on MRI L spine infxn -- ?need ID c/s -- cXR 12/22 (-) infiltrate - ?atelectasis   8. Leukocytosis -- trending up 12/22 to 14 but down to 12 on 12/23  - ?due to steroids given 12/17 - 12/19 --> see #6 -- ?need repeat cultures - ? ID c/s -- CXR 12/22 (-) -- ?related to back mass/schwannoma   9. MAURENE on CKD stage II -- creat up to 1.9 on 12/19 --> improved to 1.1 on 12/24 --  improving with lasix, entresto held --> monitor as those resumed per cardio 12/22 -- monitor fluid status -- baseline 0.9 - 1.0  10. Rhabdomyolysis  -- POA -- CK trended down with IVF -- cont monitor  11. EKG changes/elevated troponin -- per cardio - no sx - not labeled as MI at this time -- Echo 12/21/17 EF 40%, mod global hypokinesis, grade 1 diastolic dysfxn  -- per cardio note 12/23 plan stress test?? For tues. 12. Accelerated HTN/uncontrolled essential HTN -- cardene gtt stopped 12/22 -- per cardio 12/22 resumed on home lopressor 50 mg bid and stopped IV lopressor 12/22 -- hydralazine 25 4 x day  -- entresto resumed 12/22 per cardio -- cont monitor and adjust prn  13. Lumbar spine severe stenosis -- per MRI 12/22/17 due to extra-medullary lesion -- NS following -- ?plan -- no incontinence or neuro deficits at this time 12/23  14. Coagulopathy due to coumadin/elevated INR -- cont to be elevated 4's 12/22 despite coumadin being held and last dose 12/18 -- LFT unremarkable 12/20 --> repeat 12/23 WNL  -- INR down to 1.93 on 12/24 --> cont hold coumadin in case procedure is needed -- ?need bridging but on for AF  15.  CAD with hx CABG -- with hx right Complaint: back pain    Hospital Course:  Vincent Leach is a 46 y.o. male with CHF, CAD with hx CABG, CM with ICD, HTN, HLP, DM2, AFib, hx VT with ablation 2014 presented with back pain. On 12/20 pt had increasing agitation, confusion -psychiatry was consulted -- concern for withdrawal and significant amt of ativan was given without improvement in mental status thus was transferred to ICU - Dr. Kell Parra managed pt while in ICU and was placed on precedex gtt, cardene gtt for HTN -- transferred to CCU 12/21 for need for ICU beds and hospitalist service was asked to resume care. --  CT Head that showed NAP(12/17/17), CT Lumbar (12/12/17) showed Rt L5 pars defect without spondylolisthesis, mild annular bulges from L2-3 through L5-S1 with mild to moderate spinal stenosis, and possible impingement upon the Rt L2 nerve root at  L2-3. 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 Repeat MRI lumbar spine 12/22/17 with 2 intradural extra-medullary lesions and one causes severe stenosis and has concerning enhancement for ?metastatic dz --> per NS Dr. Lilibeth Davidson consulted oncology to assist with r/o malignancy.   -- was on cardene gtt for HTN - stopped 12/22 and po meds adjusted by cardio  -- cardiology consulted for EKG changes, elevated troponin, significant cardiac hx and plan stress test t- which shows just low ef    Subjective:   -- 12/29/2017  -->doing ok, walking and ambulating  back pain better, anxious, spoke in detail abt ongoing plan, today mri    However since he has icd which is mri friendly however mri tech will need the icd rep available at the site when mri is performed- the icd tech is on holiday until after new years- since no other testing is pending- will discharge him today and hget the mri done and high volume LP the margin. The signal characteristics are inconsistent with schwannoma. There are not strongly suggestive    of metastatic lesion, given the relative absence of pathologic enhancement. Meningioma and hemangioblastoma are also unlikely, given the absence of vivid pathologic enhancement. Dermoid cyst is considered but unlikely given the inconsistent signal    characteristics relative to fat. Epidermoid cyst is a possible explanation.       Satisfactory appearance of the imaged portions of the spinal cord. Postcontrast images reveal some areas of minimal enhancing leptomeningeal services, consistent with minimal arachnoiditis.       There is a new area of poorly defined pathologic enhancing intradural extra medullary structure at the S1 level. At this level, there is a lobular area of mild pathologically minimally heterogeneous enhancing tissue, measuring approximately 7 mm    transverse, 6 mm anterior posterior, 10 mm craniocaudal. T2 signal characteristics of this are near CSF isointense. This small lesion is suspicious for possible aggressive neoplasm, and, combined with the above described lesion, is suspicious for    possible metastatic deposit. Consider the possibility of primitive neural ectodermal tumor (PNET), also, which can be associated with arachnoid enhancement due to CSF spread.       No other evidence of pathologic contrast enhancement.       Images of the thoracic spine reveal no significant canal or foraminal stenosis. Mild degenerative changes are present.       The osseous margins of the canal at L1-2 have only minimally narrowed features due to mild facet degeneration. No focal disc abnormality. No significant foraminal stenosis.       At L2-3, the osseous margins of the canal are minimally narrowed due to slight bulging disc material and mild facet degenerative changes.  There is mild foraminal stenosis on the right more than left due to bulging disc material.       At L3-4, the canal is widely

## 2018-01-02 ENCOUNTER — TELEPHONE (OUTPATIENT)
Dept: FAMILY MEDICINE CLINIC | Age: 53
End: 2018-01-02

## 2018-01-02 DIAGNOSIS — M54.9 INTRACTABLE BACK PAIN: ICD-10-CM

## 2018-01-02 DIAGNOSIS — Z95.810 PRESENCE OF COMBINATION INTERNAL CARDIAC DEFIBRILLATOR (ICD) AND PACEMAKER: ICD-10-CM

## 2018-01-02 DIAGNOSIS — D33.4 SCHWANNOMA OF SPINAL CORD (HCC): Primary | ICD-10-CM

## 2018-01-02 NOTE — TELEPHONE ENCOUNTER
Spoke with Jim Rx was sent in Nov there are 5 refills left on that Rx   Patient notified pharmacy getting med ready

## 2018-01-02 NOTE — TELEPHONE ENCOUNTER
1/2/18   Alison Rodrigues called requesting a refill on the following medications:  Requested Prescriptions     Pending Prescriptions Disp Refills    sacubitril-valsartan (ENTRESTO) 49-51 MG per tablet 60 tablet 5     Sig: Take 1 tablet by mouth 2 times daily     Pharmacy verified:  .pv  PATIENT IS  OUT X 1 DAY    Date of last visit:  10/9/17  Date of next visit (if applicable): 5/8/20

## 2018-01-02 NOTE — TELEPHONE ENCOUNTER
Due to procedures and follow ups will be off till end of month at this time. RTW 1/29/17. If need to extend will extend at that time. This ok with patien:?

## 2018-01-03 ENCOUNTER — CLINICAL DOCUMENTATION (OUTPATIENT)
Dept: NEUROSURGERY | Age: 53
End: 2018-01-03

## 2018-01-04 ENCOUNTER — OFFICE VISIT (OUTPATIENT)
Dept: FAMILY MEDICINE CLINIC | Age: 53
End: 2018-01-04
Payer: COMMERCIAL

## 2018-01-04 ENCOUNTER — HOSPITAL ENCOUNTER (OUTPATIENT)
Dept: MRI IMAGING | Age: 53
Discharge: HOME | End: 2018-01-04
Payer: COMMERCIAL

## 2018-01-04 VITALS
BODY MASS INDEX: 38.01 KG/M2 | DIASTOLIC BLOOD PRESSURE: 70 MMHG | SYSTOLIC BLOOD PRESSURE: 142 MMHG | OXYGEN SATURATION: 98 % | HEIGHT: 72 IN | RESPIRATION RATE: 14 BRPM | WEIGHT: 280.6 LBS | HEART RATE: 60 BPM | TEMPERATURE: 98.2 F

## 2018-01-04 DIAGNOSIS — M54.9 INTRACTABLE BACK PAIN: ICD-10-CM

## 2018-01-04 DIAGNOSIS — N40.1 BENIGN PROSTATIC HYPERPLASIA WITH URINARY FREQUENCY: ICD-10-CM

## 2018-01-04 DIAGNOSIS — S39.012A STRAIN OF LUMBAR REGION, INITIAL ENCOUNTER: ICD-10-CM

## 2018-01-04 DIAGNOSIS — R35.0 BENIGN PROSTATIC HYPERPLASIA WITH URINARY FREQUENCY: ICD-10-CM

## 2018-01-04 DIAGNOSIS — D49.7 INTRADURAL EXTRAMEDULLARY SPINAL TUMOR: ICD-10-CM

## 2018-01-04 DIAGNOSIS — S46.001A INJURY OF RIGHT ROTATOR CUFF, INITIAL ENCOUNTER: ICD-10-CM

## 2018-01-04 DIAGNOSIS — E11.9 TYPE 2 DIABETES MELLITUS WITHOUT COMPLICATION, WITHOUT LONG-TERM CURRENT USE OF INSULIN (HCC): Primary | ICD-10-CM

## 2018-01-04 DIAGNOSIS — I50.22 CHRONIC SYSTOLIC CONGESTIVE HEART FAILURE (HCC): ICD-10-CM

## 2018-01-04 PROCEDURE — 6360000004 HC RX CONTRAST MEDICATION: Performed by: INTERNAL MEDICINE

## 2018-01-04 PROCEDURE — 70553 MRI BRAIN STEM W/O & W/DYE: CPT

## 2018-01-04 PROCEDURE — A9579 GAD-BASE MR CONTRAST NOS,1ML: HCPCS | Performed by: INTERNAL MEDICINE

## 2018-01-04 PROCEDURE — 99495 TRANSJ CARE MGMT MOD F2F 14D: CPT | Performed by: NURSE PRACTITIONER

## 2018-01-04 RX ORDER — BACLOFEN 20 MG/1
20 TABLET ORAL 3 TIMES DAILY
Qty: 30 TABLET | Refills: 0 | Status: SHIPPED | OUTPATIENT
Start: 2018-01-04 | End: 2018-01-22 | Stop reason: SDUPTHER

## 2018-01-04 RX ORDER — FINASTERIDE 5 MG/1
TABLET, FILM COATED ORAL
Qty: 90 TABLET | Refills: 3 | Status: SHIPPED | OUTPATIENT
Start: 2018-01-04 | End: 2018-12-14

## 2018-01-04 RX ORDER — HYDROCODONE BITARTRATE AND ACETAMINOPHEN 7.5; 325 MG/1; MG/1
1 TABLET ORAL EVERY 6 HOURS PRN
Qty: 10 TABLET | Refills: 0 | Status: SHIPPED | OUTPATIENT
Start: 2018-01-04 | End: 2018-01-08 | Stop reason: SDUPTHER

## 2018-01-04 RX ORDER — POTASSIUM CHLORIDE 20 MEQ/1
TABLET, EXTENDED RELEASE ORAL
Qty: 180 TABLET | Refills: 1 | Status: SHIPPED | OUTPATIENT
Start: 2018-01-04 | End: 2018-01-31 | Stop reason: SDUPTHER

## 2018-01-04 RX ADMIN — GADOTERIDOL 20 ML: 279.3 INJECTION, SOLUTION INTRAVENOUS at 08:33

## 2018-01-04 NOTE — PROGRESS NOTES
Visit Information    Have you changed or started any medications since your last visit including any over-the-counter medicines, vitamins, or herbal medicines? yes - see med list   Are you having any side effects from any of your medications? -  no  Have you stopped taking any of your medications? Is so, why? -  no    Have you seen any other physician or provider since your last visit? Yes - Records Obtained  Have you had any other diagnostic tests since your last visit? Yes - Records Obtained  Have you been seen in the emergency room and/or had an admission to a hospital since we last saw you? Yes - Records Obtained  Have you had your routine dental cleaning in the past 6 months? no    Have you activated your [employer] account? If not, what are your barriers?  No: declined     Patient Care Team:  Yoon Cuellar NP as PCP - General (Certified Nurse Practitioner)  Sherida Bosworth, MD as Consulting Physician (Orthopedic Surgery)    Medical History Review  Past Medical, Family, and Social History reviewed and does contribute to the patient presenting condition    Health Maintenance   Topic Date Due    TSH testing  1965    Potassium monitoring  1965    Creatinine monitoring  1965    Pneumococcal med risk (1 of 1 - PPSV23) 05/20/1984    Colon cancer screen colonoscopy  05/20/2015    Lipid screen  07/17/2016    Diabetic microalbuminuria test  12/15/2016    Diabetic retinal exam  12/15/2016    Flu vaccine (1) 01/04/2019 (Originally 9/1/2017)    A1C test (Diabetic or Prediabetic)  03/17/2018    Diabetic foot exam  07/07/2018    DTaP/Tdap/Td vaccine (2 - Td) 06/25/2027    Hepatitis C screen  Addressed    HIV screen  Addressed

## 2018-01-04 NOTE — PROGRESS NOTES
benign  4. Hypernatremia -- up little 12/22 and improved 12/23 -- improving with increase po free water - ?due to NS since 12/19 -- stopped 12/22 and lasix resumed 12/22 per cardio --  monitor  5. Hypokalemia -- 12/22 -- replace and monitor -- Mg 12/22 WNL  6. Metabolic acidosis -- ?due to Maureen, ? rhabdo -- up and down -- no LA done during admission -- afeb - IVF since 12/19 --> stopped 12/22 and monitor  7. Low grade temp -- 12/21 up to ~100 --Improving 12/23 and afeb 12/24  --> ?atelectasis - doubt DVT as pt on coumadin with elevated INR -- WBC trending back up 12/22 - ?mass on MRI L spine infxn -- ?need ID c/s -- cXR 12/22 (-) infiltrate - ?atelectasis   8. Leukocytosis -- trending up 12/22 to 14 but down to 12 on 12/23  - ?due to steroids given 12/17 - 12/19 --> see #6 -- ?need repeat cultures - ? ID c/s -- CXR 12/22 (-) -- ?related to back mass/schwannoma   9. MAUREEN on CKD stage II -- creat up to 1.9 on 12/19 --> improved to 1.1 on 12/24 --  improving with lasix, entresto held --> monitor as those resumed per cardio 12/22 -- monitor fluid status -- baseline 0.9 - 1.0  10. Rhabdomyolysis  -- POA -- CK trended down with IVF -- cont monitor  11. EKG changes/elevated troponin -- per cardio - no sx - not labeled as MI at this time -- Echo 12/21/17 EF 40%, mod global hypokinesis, grade 1 diastolic dysfxn  -- per cardio note 12/23 plan stress test?? For tues. 12. Accelerated HTN/uncontrolled essential HTN -- cardene gtt stopped 12/22 -- per cardio 12/22 resumed on home lopressor 50 mg bid and stopped IV lopressor 12/22 -- hydralazine 25 4 x day  -- entresto resumed 12/22 per cardio -- cont monitor and adjust prn  13. Lumbar spine severe stenosis -- per MRI 12/22/17 due to extra-medullary lesion -- NS following -- ?plan -- no incontinence or neuro deficits at this time 12/23  14.  Coagulopathy due to coumadin/elevated INR -- cont to be elevated 4's 12/22 despite coumadin being held and last dose 12/18 -- LFT unremarkable 12/20 --> repeat 12/23 WNL  -- INR down to 1.93 on 12/24 --> cont hold coumadin in case procedure is needed -- ?need bridging but on for AF  15. 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 Ischemic cardiomyopathy with ICD/chronic systolic and diastolic CHF  -- lasix held as of 12/19 -- resumed 12/22 per cardio;  Cont BB but changed from IV to po 12/22 per cardio -- On entresto (ARB) and resumed 12/22 per cardio  -- fluid status appears stable -- cXR 12/22 no pulm edema              -- Echo 12/21/17 EF 93%, grade 1 diastolic dysfxn  17. PAF -- po amio, BB -- currently SR -- coumadin held as INR elevated --> INR down to 1.93 on 12/24 --. ?bridge -- cont hold coumadin in case any procedure with above needed. 18. Type 2 DM -- SSI, tradjenta - BS appear stable -- monitor and adjust prn as po intake improves -- hgb A1C 12/17/17 = 9.5 -- home metformin held -- up and down - ??need low dose lantus  19. HLP -- statin held due to rhabdo  20. Hx VT s/p ablation 2014 -- po amio and mexitil -- cardio following - caution with seroquel, haldol, geodon  21. ETOH and polysubstance abuse -- no ETOH level done on admission - UDS + meth/amphetamine -- ?contrib to #1   22. Depression/anxiety -- psych eval done 12/20 - ?still following  23. BPH -- pringle in place 12/22 and removed 12/22 w/o issues with voiding -- proscar  24. Deconditioning -- PT/OT - PMR was consulted 12/20 and unable to eval due to #1 --> ?re- c/s -- cont monitor      Dr. Rene Zepeda - Lowell Botello 1/15/17  Dr. Nargis Reyes - Wilda Caballero 1/8/17    Wt Readings from Last 3 Encounters:   01/04/18 280 lb 9.6 oz (127.3 kg)   12/27/17 278 lb 9.6 oz (126.4 kg)   12/14/17 279 lb (126.6 kg)       Complains of leg swelling and right arm swelling. CHF with EF 40%. No significant weight gain. Poor diet since out of hospital related to salt.    On Lasix 40 mg

## 2018-01-05 ENCOUNTER — NURSE TRIAGE (OUTPATIENT)
Dept: ADMINISTRATIVE | Age: 53
End: 2018-01-05

## 2018-01-05 ENCOUNTER — HOSPITAL ENCOUNTER (OUTPATIENT)
Dept: GENERAL RADIOLOGY | Age: 53
Discharge: HOME | End: 2018-01-05
Payer: COMMERCIAL

## 2018-01-05 VITALS
DIASTOLIC BLOOD PRESSURE: 89 MMHG | TEMPERATURE: 97.8 F | HEART RATE: 59 BPM | RESPIRATION RATE: 16 BRPM | BODY MASS INDEX: 38.51 KG/M2 | OXYGEN SATURATION: 97 % | WEIGHT: 281.2 LBS | SYSTOLIC BLOOD PRESSURE: 172 MMHG

## 2018-01-05 DIAGNOSIS — M54.9 INTRACTABLE BACK PAIN: ICD-10-CM

## 2018-01-05 DIAGNOSIS — D33.4 SCHWANNOMA OF SPINAL CORD (HCC): ICD-10-CM

## 2018-01-05 PROCEDURE — 62270 DX LMBR SPI PNXR: CPT

## 2018-01-05 PROCEDURE — 88112 CYTOPATH CELL ENHANCE TECH: CPT

## 2018-01-05 PROCEDURE — 77003 FLUOROGUIDE FOR SPINE INJECT: CPT

## 2018-01-05 PROCEDURE — 2580000003 HC RX 258: Performed by: RADIOLOGY

## 2018-01-05 RX ORDER — ACETAMINOPHEN 500 MG
1000 TABLET ORAL EVERY 6 HOURS PRN
Status: DISCONTINUED | OUTPATIENT
Start: 2018-01-05 | End: 2018-01-06 | Stop reason: HOSPADM

## 2018-01-05 RX ORDER — SODIUM CHLORIDE 450 MG/100ML
INJECTION, SOLUTION INTRAVENOUS CONTINUOUS
Status: DISCONTINUED | OUTPATIENT
Start: 2018-01-05 | End: 2018-01-06 | Stop reason: HOSPADM

## 2018-01-05 RX ORDER — WARFARIN SODIUM 5 MG/1
5 TABLET ORAL
Status: ON HOLD | COMMUNITY
End: 2018-01-18 | Stop reason: HOSPADM

## 2018-01-05 RX ADMIN — SODIUM CHLORIDE: 4.5 INJECTION, SOLUTION INTRAVENOUS at 09:10

## 2018-01-05 ASSESSMENT — PAIN SCALES - GENERAL
PAINLEVEL_OUTOF10: 0

## 2018-01-05 ASSESSMENT — ENCOUNTER SYMPTOMS
BACK PAIN: 1
NAUSEA: 0
COUGH: 0
SHORTNESS OF BREATH: 0
ABDOMINAL PAIN: 0

## 2018-01-05 ASSESSMENT — PAIN - FUNCTIONAL ASSESSMENT: PAIN_FUNCTIONAL_ASSESSMENT: 0-10

## 2018-01-05 ASSESSMENT — PAIN DESCRIPTION - DESCRIPTORS: DESCRIPTORS: ACHING;SORE

## 2018-01-07 ENCOUNTER — HOSPITAL ENCOUNTER (EMERGENCY)
Age: 53
Discharge: HOME | End: 2018-01-07
Payer: COMMERCIAL

## 2018-01-07 ENCOUNTER — APPOINTMENT (OUTPATIENT)
Dept: CT IMAGING | Age: 53
End: 2018-01-07
Payer: COMMERCIAL

## 2018-01-07 VITALS
OXYGEN SATURATION: 100 % | SYSTOLIC BLOOD PRESSURE: 165 MMHG | WEIGHT: 280 LBS | TEMPERATURE: 97.6 F | HEIGHT: 74 IN | HEART RATE: 59 BPM | BODY MASS INDEX: 35.94 KG/M2 | RESPIRATION RATE: 16 BRPM | DIASTOLIC BLOOD PRESSURE: 92 MMHG

## 2018-01-07 DIAGNOSIS — M54.9 INTRACTABLE BACK PAIN: Primary | ICD-10-CM

## 2018-01-07 LAB
ALBUMIN SERPL-MCNC: 2.8 G/DL (ref 3.5–5.1)
ALP BLD-CCNC: 75 U/L (ref 38–126)
ALT SERPL-CCNC: 18 U/L (ref 11–66)
AMPHETAMINE+METHAMPHETAMINE URINE SCREEN: NEGATIVE
ANION GAP SERPL CALCULATED.3IONS-SCNC: 11 MEQ/L (ref 8–16)
ANISOCYTOSIS: ABNORMAL
AST SERPL-CCNC: 20 U/L (ref 5–40)
BACTERIA: ABNORMAL /HPF
BARBITURATE QUANTITATIVE URINE: NEGATIVE
BASOPHILS # BLD: 0 %
BASOPHILS ABSOLUTE: 0 THOU/MM3 (ref 0–0.1)
BENZODIAZEPINE QUANTITATIVE URINE: POSITIVE
BILIRUB SERPL-MCNC: 0.4 MG/DL (ref 0.3–1.2)
BILIRUBIN DIRECT: < 0.2 MG/DL (ref 0–0.3)
BILIRUBIN URINE: NEGATIVE
BLOOD, URINE: NEGATIVE
BUN BLDV-MCNC: 14 MG/DL (ref 7–22)
CALCIUM SERPL-MCNC: 9.1 MG/DL (ref 8.5–10.5)
CANNABINOID QUANTITATIVE URINE: NEGATIVE
CASTS 2: ABNORMAL /LPF
CASTS UA: ABNORMAL /LPF
CHARACTER, URINE: CLEAR
CHLORIDE BLD-SCNC: 103 MEQ/L (ref 98–111)
CO2: 26 MEQ/L (ref 23–33)
COCAINE METABOLITE QUANTITATIVE URINE: NEGATIVE
COLOR: YELLOW
CREAT SERPL-MCNC: 1.2 MG/DL (ref 0.4–1.2)
CRYSTALS, UA: ABNORMAL
EOSINOPHIL # BLD: 0.7 %
EOSINOPHILS ABSOLUTE: 0.1 THOU/MM3 (ref 0–0.4)
EPITHELIAL CELLS, UA: ABNORMAL /HPF
GFR SERPL CREATININE-BSD FRML MDRD: 63 ML/MIN/1.73M2
GLUCOSE BLD-MCNC: 86 MG/DL (ref 70–108)
GLUCOSE URINE: NEGATIVE MG/DL
HCT VFR BLD CALC: 40.4 % (ref 42–52)
HEMOGLOBIN: 13.5 GM/DL (ref 14–18)
KETONES, URINE: NEGATIVE
LEUKOCYTE ESTERASE, URINE: NEGATIVE
LIPASE: 27.3 U/L (ref 5.6–51.3)
LYMPHOCYTES # BLD: 4 %
LYMPHOCYTES ABSOLUTE: 0.5 THOU/MM3 (ref 1–4.8)
MCH RBC QN AUTO: 32.5 PG (ref 27–31)
MCHC RBC AUTO-ENTMCNC: 33.4 GM/DL (ref 33–37)
MCV RBC AUTO: 97.6 FL (ref 80–94)
MISCELLANEOUS 2: ABNORMAL
MONOCYTES # BLD: 4.7 %
MONOCYTES ABSOLUTE: 0.5 THOU/MM3 (ref 0.4–1.3)
MUCUS: ABNORMAL
NITRITE, URINE: NEGATIVE
NUCLEATED RED BLOOD CELLS: 0 /100 WBC
OPIATES, URINE: NEGATIVE
OSMOLALITY CALCULATION: 279.2 MOSMOL/KG (ref 275–300)
OXYCODONE: NEGATIVE
PDW BLD-RTO: 15 % (ref 11.5–14.5)
PH UA: 7.5
PHENCYCLIDINE QUANTITATIVE URINE: NEGATIVE
PLATELET # BLD: 192 THOU/MM3 (ref 130–400)
PMV BLD AUTO: 8.2 MCM (ref 7.4–10.4)
POTASSIUM SERPL-SCNC: 4.5 MEQ/L (ref 3.5–5.2)
PROTEIN UA: 300
RBC # BLD: 4.15 MILL/MM3 (ref 4.7–6.1)
RBC URINE: ABNORMAL /HPF
RENAL EPITHELIAL, UA: ABNORMAL
SEG NEUTROPHILS: 90.6 %
SEGMENTED NEUTROPHILS ABSOLUTE COUNT: 10.3 THOU/MM3 (ref 1.8–7.7)
SODIUM BLD-SCNC: 140 MEQ/L (ref 135–145)
SPECIFIC GRAVITY, URINE: 1.01 (ref 1–1.03)
TOTAL PROTEIN: 6.4 G/DL (ref 6.1–8)
UROBILINOGEN, URINE: 1 EU/DL
WBC # BLD: 11.4 THOU/MM3 (ref 4.8–10.8)
WBC UA: ABNORMAL /HPF
YEAST: ABNORMAL

## 2018-01-07 PROCEDURE — 85025 COMPLETE CBC W/AUTO DIFF WBC: CPT

## 2018-01-07 PROCEDURE — 74176 CT ABD & PELVIS W/O CONTRAST: CPT

## 2018-01-07 PROCEDURE — 82248 BILIRUBIN DIRECT: CPT

## 2018-01-07 PROCEDURE — 6370000000 HC RX 637 (ALT 250 FOR IP): Performed by: NURSE PRACTITIONER

## 2018-01-07 PROCEDURE — 87086 URINE CULTURE/COLONY COUNT: CPT

## 2018-01-07 PROCEDURE — 80307 DRUG TEST PRSMV CHEM ANLYZR: CPT

## 2018-01-07 PROCEDURE — 83690 ASSAY OF LIPASE: CPT

## 2018-01-07 PROCEDURE — 99284 EMERGENCY DEPT VISIT MOD MDM: CPT

## 2018-01-07 PROCEDURE — 36415 COLL VENOUS BLD VENIPUNCTURE: CPT

## 2018-01-07 PROCEDURE — 81001 URINALYSIS AUTO W/SCOPE: CPT

## 2018-01-07 PROCEDURE — 80053 COMPREHEN METABOLIC PANEL: CPT

## 2018-01-07 RX ORDER — LIDOCAINE 50 MG/G
1 PATCH TOPICAL DAILY
Status: DISCONTINUED | OUTPATIENT
Start: 2018-01-07 | End: 2018-01-07 | Stop reason: HOSPADM

## 2018-01-07 ASSESSMENT — PAIN DESCRIPTION - PAIN TYPE: TYPE: ACUTE PAIN

## 2018-01-07 ASSESSMENT — ENCOUNTER SYMPTOMS
VOMITING: 0
BLOOD IN STOOL: 0
ABDOMINAL DISTENTION: 0
ABDOMINAL PAIN: 0
SORE THROAT: 0
CHEST TIGHTNESS: 0
COLOR CHANGE: 0
RHINORRHEA: 0
VOICE CHANGE: 0
SINUS PRESSURE: 0
BACK PAIN: 0
COUGH: 0
NAUSEA: 0
DIARRHEA: 0
PHOTOPHOBIA: 0
CONSTIPATION: 0
WHEEZING: 0
EYE REDNESS: 0
SHORTNESS OF BREATH: 0

## 2018-01-07 ASSESSMENT — PAIN DESCRIPTION - ORIENTATION: ORIENTATION: LEFT;LOWER

## 2018-01-07 ASSESSMENT — PAIN DESCRIPTION - LOCATION: LOCATION: ABDOMEN;FLANK

## 2018-01-07 ASSESSMENT — PAIN DESCRIPTION - FREQUENCY: FREQUENCY: CONTINUOUS

## 2018-01-07 ASSESSMENT — PAIN DESCRIPTION - DESCRIPTORS: DESCRIPTORS: SHARP

## 2018-01-07 ASSESSMENT — PAIN SCALES - GENERAL: PAINLEVEL_OUTOF10: 8

## 2018-01-07 NOTE — ED PROVIDER NOTES
[facility] [address] [place] [address]       Chief Complaint   Patient presents with    Flank Pain     left    Abdominal Pain     left lower       Nurses Notes reviewed and I agree except as noted in the HPI. HISTORY OF PRESENT ILLNESS    Pal Mayberry is a 46 y.o. male who presents to the ED for evaluation of Left flank pain that started this morning. Denies diarrhea or vomiting. He has had chronic back pain that radiated down his leg. He was recently admitted to the hospital and had imaging completed of the lower spine that shows possible extra medullary lesions. This is being worked up by neurosurgery. He denies a history of kidney stones. He denies any hematuria. He denies any radiation of the pain down his leg but does radiate into his abdomen. REVIEW OF SYSTEMS     Review of Systems   Constitutional: Negative for appetite change, chills, diaphoresis, fatigue, fever and unexpected weight change. HENT: Negative for congestion, hearing loss, postnasal drip, rhinorrhea, sinus pressure, sore throat and voice change. Eyes: Negative for photophobia, redness and visual disturbance. Respiratory: Negative for cough, chest tightness, shortness of breath and wheezing. Cardiovascular: Negative for chest pain and palpitations. Gastrointestinal: Negative for abdominal distention, abdominal pain, blood in stool, constipation, diarrhea, nausea and vomiting. Endocrine: Negative for cold intolerance, heat intolerance, polydipsia, polyphagia and polyuria. Genitourinary: Positive for flank pain. Negative for decreased urine volume, difficulty urinating, dysuria and frequency. Musculoskeletal: Negative for arthralgias, back pain, gait problem, joint swelling, neck pain and neck stiffness. Skin: Negative for color change and rash. Allergic/Immunologic: Negative for immunocompromised state.    Neurological: Negative for dizziness, tremors, weakness, light-headedness, numbness the scribe in my presence, and it accurately records my words and actions.     Corey Pham, CNP 01/07/18 6:05 PM    Corey Pham, MELBA Koenig Counts, NP  01/07/18 [id number]

## 2018-01-07 NOTE — ED TRIAGE NOTES
Patient presents to the ED with complaints of left flank pain, and left lower abdominal pain. States this started this morning. Denies vomiting or diarrhea at this time. No other questions or concerns at this time.

## 2018-01-08 ENCOUNTER — OFFICE VISIT (OUTPATIENT)
Dept: NEUROSURGERY | Age: 53
End: 2018-01-08
Payer: COMMERCIAL

## 2018-01-08 ENCOUNTER — TELEPHONE (OUTPATIENT)
Dept: ADMINISTRATIVE | Age: 53
End: 2018-01-08

## 2018-01-08 VITALS
DIASTOLIC BLOOD PRESSURE: 97 MMHG | WEIGHT: 273.6 LBS | HEIGHT: 74 IN | SYSTOLIC BLOOD PRESSURE: 164 MMHG | HEART RATE: 65 BPM | BODY MASS INDEX: 35.11 KG/M2

## 2018-01-08 DIAGNOSIS — N50.812 PAIN IN LEFT TESTICLE: ICD-10-CM

## 2018-01-08 DIAGNOSIS — M54.9 INTRACTABLE BACK PAIN: ICD-10-CM

## 2018-01-08 DIAGNOSIS — D49.2 LUMBAR SPINE TUMOR: Primary | ICD-10-CM

## 2018-01-08 LAB
ORGANISM: ABNORMAL
URINE CULTURE REFLEX: ABNORMAL

## 2018-01-08 PROCEDURE — 99213 OFFICE O/P EST LOW 20 MIN: CPT | Performed by: NEUROLOGICAL SURGERY

## 2018-01-08 RX ORDER — HYDROCODONE BITARTRATE AND ACETAMINOPHEN 7.5; 325 MG/1; MG/1
1 TABLET ORAL EVERY 8 HOURS PRN
Qty: 21 TABLET | Refills: 0 | Status: SHIPPED | OUTPATIENT
Start: 2018-01-08 | End: 2018-01-15

## 2018-01-12 ENCOUNTER — TELEPHONE (OUTPATIENT)
Dept: NEUROSURGERY | Age: 53
End: 2018-01-12

## 2018-01-15 ENCOUNTER — PREP FOR PROCEDURE (OUTPATIENT)
Dept: NEUROSURGERY | Age: 53
End: 2018-01-15

## 2018-01-15 ENCOUNTER — HOSPITAL ENCOUNTER (OUTPATIENT)
Dept: ULTRASOUND IMAGING | Age: 53
Discharge: HOME | End: 2018-01-15
Payer: COMMERCIAL

## 2018-01-15 ENCOUNTER — HOSPITAL ENCOUNTER (OUTPATIENT)
Dept: MRI IMAGING | Age: 53
Discharge: HOME | End: 2018-01-15
Payer: COMMERCIAL

## 2018-01-15 DIAGNOSIS — Z01.818 PRE-OP TESTING: Primary | ICD-10-CM

## 2018-01-15 DIAGNOSIS — N50.812 PAIN IN LEFT TESTICLE: ICD-10-CM

## 2018-01-15 DIAGNOSIS — D49.2 LUMBAR SPINE TUMOR: ICD-10-CM

## 2018-01-15 PROCEDURE — 6360000004 HC RX CONTRAST MEDICATION: Performed by: NURSE PRACTITIONER

## 2018-01-15 PROCEDURE — 72158 MRI LUMBAR SPINE W/O & W/DYE: CPT

## 2018-01-15 PROCEDURE — 76870 US EXAM SCROTUM: CPT

## 2018-01-15 PROCEDURE — A9579 GAD-BASE MR CONTRAST NOS,1ML: HCPCS | Performed by: NURSE PRACTITIONER

## 2018-01-15 RX ORDER — SODIUM CHLORIDE 0.9 % (FLUSH) 0.9 %
10 SYRINGE (ML) INJECTION PRN
Status: CANCELLED | OUTPATIENT
Start: 2018-01-15

## 2018-01-15 RX ORDER — SODIUM CHLORIDE 0.9 % (FLUSH) 0.9 %
10 SYRINGE (ML) INJECTION EVERY 12 HOURS SCHEDULED
Status: CANCELLED | OUTPATIENT
Start: 2018-01-15

## 2018-01-15 RX ORDER — SODIUM CHLORIDE 9 MG/ML
INJECTION, SOLUTION INTRAVENOUS CONTINUOUS
Status: CANCELLED | OUTPATIENT
Start: 2018-01-15

## 2018-01-15 RX ADMIN — GADOTERIDOL 20 ML: 279.3 INJECTION, SOLUTION INTRAVENOUS at 08:50

## 2018-01-15 NOTE — H&P
History and Physical        CHIEF COMPLAINT:  Low back pain    HISTORY OF PRESENT ILLNESS:      The patient is a 46 y.o. male  who presents with low back pain that radiates to the back of his legs and causes spasms. Pt was hospitalized in December and the MRI at that time showed an intradural and extra medullary lesion at the L1 level and a smaller lesion at the S1 level. He also had a spinal tap and a brain MRI looking for malignancy, both tests were negative. Pt was seen on 1/8/18 by Dr Jori Taylor and scheduled for surgery to remove the lesions. 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    Atrial fibrillation ([address]     CAD (coronary artery disease) 3/25/2014    s/p CABG in may 2014    Cardiomyopathy ([address]     Depression     Diabetes mellitus ([address]     Drug abuse     hx of cacaine abuse, stopped abusing drugs in 2012    H/O cardiac catheterization 4/30/2014    Hyperlipidemia     Hypertension     Paroxysmal atrial fibrillation ([address] 7/22/2014    [facility]     [facility])     s/p ablation in dec 2014       Past Surgical History:    Past Surgical History:   Procedure Laterality Date    CARDIAC CATHETERIZ[facility]  3/20/14     [facility]    CARDIAC DEFIBRILLATOR PLACEMENT  10/13/2015    [employer] [facility], MRI CONDITIONAL ICD    CORONARY ARTERY BYPASS GRAFT  5-9-14    3 bypass    EKG 12-LEAD  7/17/2015         OTHER SURGICAL HISTORY Left 10/21/14    Left Bursectomy, I & D Left Elbow - Dr. Favian George harvest from legs       Medications Prior to Admission:   No current facility-administered medications for this visit. No current outpatient prescriptions on file.      Facility-Administered Medications Ordered in Other Visits   Medication Dose Route Frequency Pelvis: stable  Extremity  Neurologic Exam     Mental Status   Oriented to person, place, and time. Speech: speech is normal   Level of consciousness: alert  Knowledge: consistent with education. Cranial Nerves   Cranial nerves II through XII intact. Motor Exam   Muscle bulk: normal  Overall muscle tone: normal    Sensory Exam   Light touch normal.     Gait, Coordination, and Reflexes     Gait  Gait: normal    Tremor   Resting tremor: absent      DATA:  No results found for this visit on 01/15/18. Radiology:   Ct Abdomen Pelvis Wo Iv Contrast Additional Contrast? None    Result Date: 1/7/2018  PROCEDURE: CT ABDOMEN PELVIS WO IV CONTRAST CLINICAL INFORMATION: left flank pain COMPARISON: December 12, 2017 TECHNIQUE:  Axial CT images were obtained through the abdomen and pelvis without contrast. Coronal and sagittal reformatted images were rendered. All CT scans at this facility use dose modulation, iterative reconstruction, and/or weight-based dosing when  appropriate to reduce radiation dose to as low as reasonably achievable. FINDINGS: Limited evaluation of the lung bases appear unremarkable. Noncontrast evaluation of the visualized liver appears within normal limits. Mild hyperdensity within the gallbladder may represent gallstones or sludge. No pericholecystic fluid is seen. The spleen appears normal. There is a rounded low-density lesion within the pancreatic tail which is incompletely characterized on the current examination. This measures 1.1 cm in diameter which does not appear significantly changed from the prior CT examination. This is better characterized on recent abdominal MRI from 12/26/2017. Please refer to MRI report for further follow-up recommendations. There is prominence of the bilateral adrenal glands which appears unchanged prior examination. This is compatible with previously described adrenal hypertrophy seen on prior MRI from December 2017.  Stable appearing low-density lesions voice recognition software. It may contain minor errors which are inherent in voice recognition technology. ** Final report electronically signed by Dr. Christian Martin on 12/17/2017 3:18 PM    Ct Head Wo Contrast    Result Date: 12/17/2017  PROCEDURE: CT HEAD WO CONTRAST CLINICAL INFORMATION: Headache TECHNIQUE: CT scan of the head was performed from the vertex to the skull base without use of intravenous contrast. All CT scans at this facility use dose modulation, iterative reconstruction, and/or weight-based dosing when appropriate to reduce radiation dose to as low as reasonably achievable. COMPARISON: CT head 6/29/2017 FINDINGS: There is no mass effect, midline shift or acute hemorrhage. Ventricles and sulci are mildly prominent. There is mild diffuse periventricular white matter hypoattenuation. Visualized orbits and mastoid air cells are unremarkable. There are mucous retention cysts or polyps in the bilateral maxillary sinuses. 1. No mass effect or acute hemorrhage. 2. Mild chronic periventricular small vessel ischemic changes. **This report has been created using voice recognition software. It may contain minor errors which are inherent in voice recognition technology. ** Final report electronically signed by Dr. Zee Florentino on 12/17/2017 2:44 PM    Ct Chest W Contrast    Result Date: 12/24/2017  PROCEDURE: CT CHEST W CONTRAST CLINICAL INFORMATION: Concern for lung cancer. Has suspicious lesions on MRI of the lumbar spine. COMPARISON: CTA chest January 20, 2016. CT abdomen pelvis December 12, 2017. TECHNIQUE: 5 mm axial images were obtained through the chest after the administration of intravenous contrast. Sagittal and coronal reconstructions were obtained. All CT scans at this facility use dose modulation, iterative reconstruction, and/or weight-based dosing when appropriate to reduce radiation dose to as low as reasonably achievable. FINDINGS: There is mild calcification of the arch of aorta.  There is Final report electronically signed by Dr. Monisha Perea on 1/15/2018 9:32 AM    Mri Lumbar Spine W Wo Contrast    Result Date: 12/22/2017  PROCEDURE: MRI LUMBAR SPINE W WO CONTRAST CLINICAL INFORMATION: BACK PAIN, UNSPECIFIED, COMPARISON: Lumbar spine MRI, 19 December 2017; post process lumbar spine reconstruction, 12 December 2017 TECHNIQUE: Using a 1.5 Yuli siemens scanner, sagittal T2-weighted turbospin echo and T2-weighted short tau inversion recovery images of the lumbosacral spine were obtained. Pre-and postcontrast (ProHance, 20 mL) sagittal T1-weighted turbo spin-echo images were also obtained. Axial T2-weighted turbo spin-echo images were obtained of the intervertebral disc spaces, oriented parallel to the respective endplates, extending from L1-S1, using the last lumbarized vertebra as L5. Pre-and postcontrast axial  T1-weighted turbo spin-echo images were also obtained 2 areas in block fashion, one extending from mid T12 inferiorly through inferior L2, parallel to the L1-2 disc space, and a second from inferior L3 through mid S1, oriented grossly parallel to the L4-5 disc space. FINDINGS: This study is degraded by motion artifact. The alignment of the osseous framework is satisfactory. Better defined on the prior CT, bilateral L5 spondylolysis again evident. No convincing marrow edema. No evidence of fracture. There are mildly desiccated intervertebral disks diffusely. This space thickness is grossly satisfactory. The conus medullaris is located at the superior L1 level. There is an intradural extra medullary lesion located along the dorsal aspect of the subcutaneous dural spaces, less likely subarachnoid spaces extending from superior L1 to inferior L1. There is secondary deviation of the cauda equina elements with significant compromise of the caudate lobe elements, producing near complete effacement of the subarachnoid spaces.  This, combined with the motion degradation, results in a limited THOUGH POSSIBLY CONSIDER. THE LARGEST LESION, LOCATED AT THE L1 LEVEL, PRODUCES SECONDARY SEVERE STENOSIS OF THE CONTENTS OF THE THECAL SAC. NO SIGNIFICANT STENOSIS PRODUCED BY THE SMALLER LESION. MILD DEGENERATIVE CHANGES AS DISCUSSED. **This report has been created using voice recognition software. It may contain minor errors which are inherent in voice recognition technology. ** Final report electronically signed by Dr. Vince Rodgers on 12/22/2017 2:20 PM    Mri Abdomen W Wo Contrast    Result Date: 12/27/2017  PROCEDURE: MRI ABDOMEN W WO CONTRAST CLINICAL INFORMATION: Pancreatic mass. COMPARISON: CT abdomen/pelvis dated 12/12/2017 and CT chest dated 12/23/2017. TECHNIQUE: Routine MRI abdomen without and with IV contrast.  Routine MRCP was also performed. CONTRAST: 20 mL ProHance intravenously. FINDINGS: LIVER: 1. The liver is normal size. 2. The hepatic parenchymal signal is normal. 3. There is no hepatic mass. 4. There is no intrahepatic biliary dilatation. 5. There is normal enhancement of the portal and the hepatic veins. 6. There is normal enhancement of the portal venous confluence, SMV, and splenic vein. GALLBLADDER: 1. There is heterogeneous intermediate decreased T2 weighted signal within the gallbladder which could represent sludge. There are no low signal foci within the gallbladder to suggest gallstones. There is no distention of the gallbladder or thickening of  the gallbladder wall. There is a small amount of fluid adjacent to the scheduled there is a small amount of pericholecystic fluid. BILIARY TREE: 1. The common duct is normal size measuring 0.3 cm in transverse dimension. 2. There is no choledocholithiasis. PANCREAS: 1. The pancreas is normal size and signal characteristics. 2. There is normal pancreatic enhancement. 3. There is a 1.2 cm cystic mass within the pancreatic tail with a low T2 weighted signal rim. There is no soft tissue component or enhancement.  There is no distention of the confirm stability. 2. The pancreatic duct at the level of the pancreatic head is not well visualized however there is suspicion for pancreatic disease and on the source MRCP images. 3. There is heterogeneous intermediate decreased T2 weighted signal within the gallbladder which could represent sludge. There are no low signal foci within the gallbladder to suggest gallstones. There is no distention of the gallbladder or thickening of  the gallbladder wall. There is a small amount of fluid adjacent to the scheduled there is a small amount of pericholecystic fluid. There is no biliary dilatation. 4. There is mild splenomegaly measuring 13.8 x 5.7 x 15.5 cm in longitudinal, transverse and AP dimensions. 5. There is stable adrenal hypertrophy, left greater than right with diffuse signal dropout on the T1 out of phase gradient images. There is no focal adrenal mass. 6. There are several Bosniak 1 left renal cysts, largest measuring 2.3 cm. There is a moderate amount of edema within the left perinephric space. The left kidney is otherwise unremarkable. 7. There are Bosniak 1 right renal cysts, largest measuring 1.6 cm. There is a moderate amount of edema within the right perinephric space. The right kidney is otherwise unremarkable. 8. There is a small amount of free fluid adjacent to the spleen. 9. There is mild pleural reaction at the right lateral costophrenic angle. **This report has been created using voice recognition software. It may contain minor errors which are inherent in voice recognition technology. ** Final report electronically signed by Dr. Monica Arteaga on 12/27/2017 7:26 AM    Us Scrotum And Testicles    Result Date: 1/15/2018  PROCEDURE: US SCROTUM AND TESTICLES CLINICAL INFORMATION: Pain in left testicle . COMPARISON: 7/16/2015 TECHNIQUE: Grayscale color and spectral duplex imaging FINDINGS: Fairly stable appearance of the scrotum and contents.  The right testicle is unchanged in size and echogenicity normal vascularity is seen. There is a right varicocele present. There is a small irregular fluid collection adjacent to the right testicle showing internal punctate debris suggesting a small complicated hydrocele or sterile abscess. This measures 29 x 8.3 mm. Left testicle is unchanged there is no intratesticular mass. A left varicocele. A small focal calcification seen in the scrotum posterior to the left testicle on the prior exam is now inferior to the left testicle suggests a small scrotal eric. Right Testicle- 4.2 x 2.4 x 1.8 cm Right Epididymis- 1.3 x 1.1 x 1.2 cm Left Testicle - 4.0 x 2.5 x 1.7 cm Left Epididymis - 0.88 x 1.0 x 0.89 cm     Other than a tiny complex fluid collection in the right hemiscrotum examination is stable bilateral hydroceles and left scrotal peripheral **This report has been created using voice recognition software. It may contain minor errors which are inherent in voice recognition technology. ** Final report electronically signed by Dr. Escobar Middleton on 1/15/2018 10:19 AM    Xr Chest Portable    Result Date: 12/22/2017  PROCEDURE: XR CHEST PORTABLE CLINICAL INFORMATION: shortness of breath, . COMPARISON: PA and lateral chest x-ray January 3, 2017. TECHNIQUE: AP upright view of the chest. FINDINGS: The heart size is normal.  The mediastinum is not widened. There are low lung volumes. There is mild compressive atelectasis in both lungs. No consolidation. Stable position of the left side AICD unit. No pneumothorax. There are no pulmonary infiltrates  or effusions. The pulmonary vascularity is normal. No suspicious osseous lesions are present. 1.  Lower lung volumes than the previous study with mild compressive atelectasis. **This report has been created using voice recognition software. It may contain minor errors which are inherent in voice recognition technology. ** Final report electronically signed by Dr. Biju Tomlin on 12/22/2017 1:59 AM    Mri Brain W Wo Contrast    Result

## 2018-01-15 NOTE — PROGRESS NOTES
Notified Ruth Nagel, [employer] rep., instructed to place/tape magnet and call if anything is needed.

## 2018-01-16 ENCOUNTER — HOSPITAL ENCOUNTER (INPATIENT)
Age: 53
LOS: 2 days | Discharge: HOME HEALTH | DRG: 519 | End: 2018-01-18
Attending: NEUROLOGICAL SURGERY | Admitting: INTERNAL MEDICINE
Payer: COMMERCIAL

## 2018-01-16 ENCOUNTER — APPOINTMENT (OUTPATIENT)
Dept: GENERAL RADIOLOGY | Age: 53
DRG: 519 | End: 2018-01-16
Attending: NEUROLOGICAL SURGERY
Payer: COMMERCIAL

## 2018-01-16 ENCOUNTER — ANESTHESIA (OUTPATIENT)
Dept: OPERATING ROOM | Age: 53
DRG: 519 | End: 2018-01-16
Payer: COMMERCIAL

## 2018-01-16 ENCOUNTER — ANESTHESIA EVENT (OUTPATIENT)
Dept: OPERATING ROOM | Age: 53
DRG: 519 | End: 2018-01-16
Payer: COMMERCIAL

## 2018-01-16 VITALS — OXYGEN SATURATION: 99 % | TEMPERATURE: 99 F | DIASTOLIC BLOOD PRESSURE: 66 MMHG | SYSTOLIC BLOOD PRESSURE: 110 MMHG

## 2018-01-16 DIAGNOSIS — D49.2 LUMBAR SPINE TUMOR: Primary | ICD-10-CM

## 2018-01-16 DIAGNOSIS — D33.4 SCHWANNOMA OF SPINAL CORD (HCC): ICD-10-CM

## 2018-01-16 LAB
ABO: NORMAL
AMPHETAMINE+METHAMPHETAMINE URINE SCREEN: NEGATIVE
ANTIBODY SCREEN: NORMAL
APTT: 33.1 SECONDS (ref 22–38)
BARBITURATE QUANTITATIVE URINE: NEGATIVE
BASE EXCESS (CALCULATED): -2.9 MMOL/L (ref -2.5–2.5)
BENZODIAZEPINE QUANTITATIVE URINE: NORMAL
CALCIUM IONIZED SERUM: 1.22 MMOL/L (ref 1.12–1.32)
CANNABINOID QUANTITATIVE URINE: NEGATIVE
COCAINE METABOLITE QUANTITATIVE URINE: NEGATIVE
COLLECTED BY:: ABNORMAL
GLUCOSE BLD-MCNC: 115 MG/DL (ref 70–108)
GLUCOSE, WHOLE BLOOD: 99 MG/DL (ref 70–108)
HCO3: 23 MMOL/L (ref 23–28)
HEMOGLOBIN FINGERSTICK, POC: 12 G/DL (ref 14–18)
O2 SATURATION: 100 %
OPIATES, URINE: NORMAL
OXYCODONE: NEGATIVE
PCO2: 42 MMHG (ref 35–45)
PH BLOOD GAS: 7.34 (ref 7.35–7.45)
PHENCYCLIDINE QUANTITATIVE URINE: NEGATIVE
PO2: 195 MMHG (ref 71–104)
POTASSIUM, WHOLE BLOOD: 4.2 MEQ/L (ref 3.5–4.9)
RH FACTOR: NORMAL
SODIUM, WHOLE BLOOD: 142 MEQ/L (ref 138–146)

## 2018-01-16 PROCEDURE — 88342 IMHCHEM/IMCYTCHM 1ST ANTB: CPT

## 2018-01-16 PROCEDURE — 2500000003 HC RX 250 WO HCPCS: Performed by: NEUROLOGICAL SURGERY

## 2018-01-16 PROCEDURE — 7100000001 HC PACU RECOVERY - ADDTL 15 MIN: Performed by: NEUROLOGICAL SURGERY

## 2018-01-16 PROCEDURE — 69990 MICROSURGERY ADD-ON: CPT | Performed by: NEUROLOGICAL SURGERY

## 2018-01-16 PROCEDURE — 85018 HEMOGLOBIN: CPT

## 2018-01-16 PROCEDURE — 88331 PATH CONSLTJ SURG 1 BLK 1SPC: CPT

## 2018-01-16 PROCEDURE — 2580000003 HC RX 258: Performed by: NEUROLOGICAL SURGERY

## 2018-01-16 PROCEDURE — 3209999900 FLUORO FOR SURGICAL PROCEDURES

## 2018-01-16 PROCEDURE — 86923 COMPATIBILITY TEST ELECTRIC: CPT

## 2018-01-16 PROCEDURE — 36415 COLL VENOUS BLD VENIPUNCTURE: CPT

## 2018-01-16 PROCEDURE — 95940 IONM IN OPERATNG ROOM 15 MIN: CPT | Performed by: NEUROLOGICAL SURGERY

## 2018-01-16 PROCEDURE — 86901 BLOOD TYPING SEROLOGIC RH(D): CPT

## 2018-01-16 PROCEDURE — P9045 ALBUMIN (HUMAN), 5%, 250 ML: HCPCS | Performed by: SPECIALIST

## 2018-01-16 PROCEDURE — 88305 TISSUE EXAM BY PATHOLOGIST: CPT

## 2018-01-16 PROCEDURE — 63282 BX/EXC IDRL SPINE LESN LMBR: CPT | Performed by: NEUROLOGICAL SURGERY

## 2018-01-16 PROCEDURE — 86850 RBC ANTIBODY SCREEN: CPT

## 2018-01-16 PROCEDURE — 72020 X-RAY EXAM OF SPINE 1 VIEW: CPT

## 2018-01-16 PROCEDURE — 2780000010 HC IMPLANT OTHER: Performed by: NEUROLOGICAL SURGERY

## 2018-01-16 PROCEDURE — 3700000001 HC ADD 15 MINUTES (ANESTHESIA): Performed by: NEUROLOGICAL SURGERY

## 2018-01-16 PROCEDURE — 6370000000 HC RX 637 (ALT 250 FOR IP): Performed by: HOSPITALIST

## 2018-01-16 PROCEDURE — 84132 ASSAY OF SERUM POTASSIUM: CPT

## 2018-01-16 PROCEDURE — 6370000000 HC RX 637 (ALT 250 FOR IP): Performed by: NEUROLOGICAL SURGERY

## 2018-01-16 PROCEDURE — 6370000000 HC RX 637 (ALT 250 FOR IP)

## 2018-01-16 PROCEDURE — 86900 BLOOD TYPING SEROLOGIC ABO: CPT

## 2018-01-16 PROCEDURE — 1210000002 HC MED SURG R&B - NEUROSCIENCE

## 2018-01-16 PROCEDURE — 2580000003 HC RX 258: Performed by: NURSE PRACTITIONER

## 2018-01-16 PROCEDURE — 6360000002 HC RX W HCPCS: Performed by: SPECIALIST

## 2018-01-16 PROCEDURE — 88313 SPECIAL STAINS GROUP 2: CPT

## 2018-01-16 PROCEDURE — 3600000005 HC SURGERY LEVEL 5 BASE: Performed by: NEUROLOGICAL SURGERY

## 2018-01-16 PROCEDURE — 2500000003 HC RX 250 WO HCPCS: Performed by: NURSE PRACTITIONER

## 2018-01-16 PROCEDURE — 6360000002 HC RX W HCPCS: Performed by: NURSE PRACTITIONER

## 2018-01-16 PROCEDURE — 82947 ASSAY GLUCOSE BLOOD QUANT: CPT

## 2018-01-16 PROCEDURE — 6360000002 HC RX W HCPCS: Performed by: ANESTHESIOLOGY

## 2018-01-16 PROCEDURE — 88360 TUMOR IMMUNOHISTOCHEM/MANUAL: CPT

## 2018-01-16 PROCEDURE — 76998 US GUIDE INTRAOP: CPT | Performed by: NEUROLOGICAL SURGERY

## 2018-01-16 PROCEDURE — 80305 DRUG TEST PRSMV DIR OPT OBS: CPT

## 2018-01-16 PROCEDURE — A6446 CONFORM BAND S W>=3" <5"/YD: HCPCS | Performed by: NEUROLOGICAL SURGERY

## 2018-01-16 PROCEDURE — 2500000003 HC RX 250 WO HCPCS: Performed by: SPECIALIST

## 2018-01-16 PROCEDURE — 84295 ASSAY OF SERUM SODIUM: CPT

## 2018-01-16 PROCEDURE — 2580000003 HC RX 258: Performed by: SPECIALIST

## 2018-01-16 PROCEDURE — 88341 IMHCHEM/IMCYTCHM EA ADD ANTB: CPT

## 2018-01-16 PROCEDURE — A6252 ABSORPT DRG >16 <=48 W/O BDR: HCPCS | Performed by: NEUROLOGICAL SURGERY

## 2018-01-16 PROCEDURE — C1713 ANCHOR/SCREW BN/BN,TIS/BN: HCPCS | Performed by: NEUROLOGICAL SURGERY

## 2018-01-16 PROCEDURE — 82803 BLOOD GASES ANY COMBINATION: CPT

## 2018-01-16 PROCEDURE — 3700000000 HC ANESTHESIA ATTENDED CARE: Performed by: NEUROLOGICAL SURGERY

## 2018-01-16 PROCEDURE — 82330 ASSAY OF CALCIUM: CPT

## 2018-01-16 PROCEDURE — A6258 TRANSPARENT FILM >16<=48 IN: HCPCS | Performed by: NEUROLOGICAL SURGERY

## 2018-01-16 PROCEDURE — 6370000000 HC RX 637 (ALT 250 FOR IP): Performed by: SPECIALIST

## 2018-01-16 PROCEDURE — 88323 CONSLTJ&REPRT MATRL PREP SLD: CPT

## 2018-01-16 PROCEDURE — 82948 REAGENT STRIP/BLOOD GLUCOSE: CPT

## 2018-01-16 PROCEDURE — 7100000000 HC PACU RECOVERY - FIRST 15 MIN: Performed by: NEUROLOGICAL SURGERY

## 2018-01-16 PROCEDURE — 6370000000 HC RX 637 (ALT 250 FOR IP): Performed by: NURSE PRACTITIONER

## 2018-01-16 PROCEDURE — 00BY0ZX EXCISION OF LUMBAR SPINAL CORD, OPEN APPROACH, DIAGNOSTIC: ICD-10-PCS | Performed by: NEUROLOGICAL SURGERY

## 2018-01-16 PROCEDURE — 85730 THROMBOPLASTIN TIME PARTIAL: CPT

## 2018-01-16 PROCEDURE — 2720000010 HC SURG SUPPLY STERILE: Performed by: NEUROLOGICAL SURGERY

## 2018-01-16 PROCEDURE — A4649 SURGICAL SUPPLIES: HCPCS | Performed by: NEUROLOGICAL SURGERY

## 2018-01-16 PROCEDURE — 3600000015 HC SURGERY LEVEL 5 ADDTL 15MIN: Performed by: NEUROLOGICAL SURGERY

## 2018-01-16 DEVICE — DURAGEN® SUTURABLE DURAL REGENERATION MATRIX, 2 IN X 2 IN (5 CM X 5 CM)
Type: IMPLANTABLE DEVICE | Site: SPINE LUMBAR | Status: FUNCTIONAL
Brand: DURAGEN® SUTURABLE

## 2018-01-16 DEVICE — FLOSEAL HEMOSTATIC MATRIX, 5 ML
Type: IMPLANTABLE DEVICE | Status: FUNCTIONAL
Brand: FLOSEAL

## 2018-01-16 DEVICE — SEALANT FIBRIN 4 CC FRZN PRE FILLED SYR TISSEEL: Type: IMPLANTABLE DEVICE | Status: FUNCTIONAL

## 2018-01-16 RX ORDER — PROPOFOL 10 MG/ML
INJECTION, EMULSION INTRAVENOUS PRN
Status: DISCONTINUED | OUTPATIENT
Start: 2018-01-16 | End: 2018-01-16 | Stop reason: SDUPTHER

## 2018-01-16 RX ORDER — HYDROCODONE BITARTRATE AND ACETAMINOPHEN 7.5; 325 MG/1; MG/1
1 TABLET ORAL EVERY 6 HOURS PRN
Status: ON HOLD | COMMUNITY
End: 2018-01-18 | Stop reason: HOSPADM

## 2018-01-16 RX ORDER — SODIUM CHLORIDE 0.9 % (FLUSH) 0.9 %
10 SYRINGE (ML) INJECTION EVERY 12 HOURS SCHEDULED
Status: DISCONTINUED | OUTPATIENT
Start: 2018-01-16 | End: 2018-01-18 | Stop reason: HOSPADM

## 2018-01-16 RX ORDER — FENTANYL CITRATE 50 UG/ML
INJECTION, SOLUTION INTRAMUSCULAR; INTRAVENOUS PRN
Status: DISCONTINUED | OUTPATIENT
Start: 2018-01-16 | End: 2018-01-16 | Stop reason: SDUPTHER

## 2018-01-16 RX ORDER — SODIUM CHLORIDE 0.9 % (FLUSH) 0.9 %
10 SYRINGE (ML) INJECTION EVERY 12 HOURS SCHEDULED
Status: DISCONTINUED | OUTPATIENT
Start: 2018-01-16 | End: 2018-01-16 | Stop reason: HOSPADM

## 2018-01-16 RX ORDER — EPHEDRINE SULFATE 50 MG/ML
INJECTION INTRAVENOUS PRN
Status: DISCONTINUED | OUTPATIENT
Start: 2018-01-16 | End: 2018-01-16 | Stop reason: SDUPTHER

## 2018-01-16 RX ORDER — GLYCOPYRROLATE 0.2 MG/ML
INJECTION INTRAMUSCULAR; INTRAVENOUS PRN
Status: DISCONTINUED | OUTPATIENT
Start: 2018-01-16 | End: 2018-01-16 | Stop reason: SDUPTHER

## 2018-01-16 RX ORDER — SODIUM CHLORIDE 0.9 % (FLUSH) 0.9 %
10 SYRINGE (ML) INJECTION PRN
Status: DISCONTINUED | OUTPATIENT
Start: 2018-01-16 | End: 2018-01-16 | Stop reason: HOSPADM

## 2018-01-16 RX ORDER — FINASTERIDE 5 MG/1
5 TABLET, FILM COATED ORAL DAILY
Status: DISCONTINUED | OUTPATIENT
Start: 2018-01-16 | End: 2018-01-18 | Stop reason: HOSPADM

## 2018-01-16 RX ORDER — FUROSEMIDE 40 MG/1
40 TABLET ORAL DAILY
Status: DISCONTINUED | OUTPATIENT
Start: 2018-01-16 | End: 2018-01-18 | Stop reason: HOSPADM

## 2018-01-16 RX ORDER — AMIODARONE HYDROCHLORIDE 200 MG/1
200 TABLET ORAL 2 TIMES DAILY
Status: DISCONTINUED | OUTPATIENT
Start: 2018-01-16 | End: 2018-01-18 | Stop reason: HOSPADM

## 2018-01-16 RX ORDER — HYDROCODONE BITARTRATE AND ACETAMINOPHEN 7.5; 325 MG/1; MG/1
1 TABLET ORAL EVERY 6 HOURS PRN
Status: DISCONTINUED | OUTPATIENT
Start: 2018-01-16 | End: 2018-01-17

## 2018-01-16 RX ORDER — DEXTROSE MONOHYDRATE 25 G/50ML
12.5 INJECTION, SOLUTION INTRAVENOUS PRN
Status: DISCONTINUED | OUTPATIENT
Start: 2018-01-16 | End: 2018-01-18 | Stop reason: HOSPADM

## 2018-01-16 RX ORDER — DEXTROSE, SODIUM CHLORIDE, AND POTASSIUM CHLORIDE 5; .45; .15 G/100ML; G/100ML; G/100ML
INJECTION INTRAVENOUS CONTINUOUS
Status: DISCONTINUED | OUTPATIENT
Start: 2018-01-16 | End: 2018-01-18 | Stop reason: HOSPADM

## 2018-01-16 RX ORDER — TAMSULOSIN HYDROCHLORIDE 0.4 MG/1
0.4 CAPSULE ORAL DAILY
Status: DISCONTINUED | OUTPATIENT
Start: 2018-01-16 | End: 2018-01-18 | Stop reason: HOSPADM

## 2018-01-16 RX ORDER — ROCURONIUM BROMIDE 10 MG/ML
INJECTION, SOLUTION INTRAVENOUS PRN
Status: DISCONTINUED | OUTPATIENT
Start: 2018-01-16 | End: 2018-01-16 | Stop reason: SDUPTHER

## 2018-01-16 RX ORDER — MEXILETINE HYDROCHLORIDE 150 MG/1
300 CAPSULE ORAL EVERY 8 HOURS SCHEDULED
Status: DISCONTINUED | OUTPATIENT
Start: 2018-01-16 | End: 2018-01-18 | Stop reason: HOSPADM

## 2018-01-16 RX ORDER — LABETALOL HYDROCHLORIDE 5 MG/ML
10 INJECTION, SOLUTION INTRAVENOUS EVERY 10 MIN PRN
Status: DISCONTINUED | OUTPATIENT
Start: 2018-01-16 | End: 2018-01-16 | Stop reason: HOSPADM

## 2018-01-16 RX ORDER — ONDANSETRON 2 MG/ML
4 INJECTION INTRAMUSCULAR; INTRAVENOUS EVERY 6 HOURS PRN
Status: DISCONTINUED | OUTPATIENT
Start: 2018-01-16 | End: 2018-01-18 | Stop reason: HOSPADM

## 2018-01-16 RX ORDER — BACLOFEN 10 MG/1
20 TABLET ORAL 3 TIMES DAILY
Status: DISCONTINUED | OUTPATIENT
Start: 2018-01-16 | End: 2018-01-18 | Stop reason: HOSPADM

## 2018-01-16 RX ORDER — ALBUMIN, HUMAN INJ 5% 5 %
SOLUTION INTRAVENOUS PRN
Status: DISCONTINUED | OUTPATIENT
Start: 2018-01-16 | End: 2018-01-16 | Stop reason: SDUPTHER

## 2018-01-16 RX ORDER — MIDAZOLAM HYDROCHLORIDE 1 MG/ML
INJECTION INTRAMUSCULAR; INTRAVENOUS PRN
Status: DISCONTINUED | OUTPATIENT
Start: 2018-01-16 | End: 2018-01-16 | Stop reason: SDUPTHER

## 2018-01-16 RX ORDER — POTASSIUM CHLORIDE 20 MEQ/1
20 TABLET, EXTENDED RELEASE ORAL 2 TIMES DAILY
Status: DISCONTINUED | OUTPATIENT
Start: 2018-01-16 | End: 2018-01-18 | Stop reason: HOSPADM

## 2018-01-16 RX ORDER — DIAZEPAM 5 MG/1
5 TABLET ORAL 2 TIMES DAILY
Status: DISCONTINUED | OUTPATIENT
Start: 2018-01-16 | End: 2018-01-18 | Stop reason: HOSPADM

## 2018-01-16 RX ORDER — MORPHINE SULFATE 2 MG/ML
2 INJECTION, SOLUTION INTRAMUSCULAR; INTRAVENOUS EVERY 5 MIN PRN
Status: COMPLETED | OUTPATIENT
Start: 2018-01-16 | End: 2018-01-16

## 2018-01-16 RX ORDER — FENTANYL CITRATE 50 UG/ML
50 INJECTION, SOLUTION INTRAMUSCULAR; INTRAVENOUS EVERY 5 MIN PRN
Status: COMPLETED | OUTPATIENT
Start: 2018-01-16 | End: 2018-01-16

## 2018-01-16 RX ORDER — SODIUM CHLORIDE 0.9 % (FLUSH) 0.9 %
10 SYRINGE (ML) INJECTION PRN
Status: DISCONTINUED | OUTPATIENT
Start: 2018-01-16 | End: 2018-01-18 | Stop reason: HOSPADM

## 2018-01-16 RX ORDER — MINERAL OIL AND WHITE PETROLATUM 150; 830 MG/G; MG/G
OINTMENT OPHTHALMIC PRN
Status: DISCONTINUED | OUTPATIENT
Start: 2018-01-16 | End: 2018-01-16 | Stop reason: SDUPTHER

## 2018-01-16 RX ORDER — DIAZEPAM 5 MG/1
TABLET ORAL
Status: COMPLETED
Start: 2018-01-16 | End: 2018-01-16

## 2018-01-16 RX ORDER — ALPRAZOLAM 0.5 MG/1
0.5 TABLET ORAL 2 TIMES DAILY PRN
Status: DISCONTINUED | OUTPATIENT
Start: 2018-01-16 | End: 2018-01-16 | Stop reason: ALTCHOICE

## 2018-01-16 RX ORDER — METOPROLOL TARTRATE 50 MG/1
50 TABLET, FILM COATED ORAL 2 TIMES DAILY
Status: DISCONTINUED | OUTPATIENT
Start: 2018-01-16 | End: 2018-01-18 | Stop reason: HOSPADM

## 2018-01-16 RX ORDER — LIDOCAINE HYDROCHLORIDE 20 MG/ML
INJECTION, SOLUTION EPIDURAL; INFILTRATION; INTRACAUDAL; PERINEURAL PRN
Status: DISCONTINUED | OUTPATIENT
Start: 2018-01-16 | End: 2018-01-16 | Stop reason: SDUPTHER

## 2018-01-16 RX ORDER — HYDRALAZINE HYDROCHLORIDE 25 MG/1
25 TABLET, FILM COATED ORAL EVERY 8 HOURS SCHEDULED
Status: DISCONTINUED | OUTPATIENT
Start: 2018-01-16 | End: 2018-01-18 | Stop reason: HOSPADM

## 2018-01-16 RX ORDER — SUCCINYLCHOLINE CHLORIDE 20 MG/ML
INJECTION INTRAMUSCULAR; INTRAVENOUS PRN
Status: DISCONTINUED | OUTPATIENT
Start: 2018-01-16 | End: 2018-01-16 | Stop reason: SDUPTHER

## 2018-01-16 RX ORDER — OXYCODONE HYDROCHLORIDE 5 MG/1
5 TABLET ORAL EVERY 4 HOURS PRN
Status: DISCONTINUED | OUTPATIENT
Start: 2018-01-16 | End: 2018-01-16

## 2018-01-16 RX ORDER — ONDANSETRON 2 MG/ML
INJECTION INTRAMUSCULAR; INTRAVENOUS PRN
Status: DISCONTINUED | OUTPATIENT
Start: 2018-01-16 | End: 2018-01-16 | Stop reason: SDUPTHER

## 2018-01-16 RX ORDER — SODIUM CHLORIDE, SODIUM LACTATE, POTASSIUM CHLORIDE, CALCIUM CHLORIDE 600; 310; 30; 20 MG/100ML; MG/100ML; MG/100ML; MG/100ML
INJECTION, SOLUTION INTRAVENOUS CONTINUOUS PRN
Status: DISCONTINUED | OUTPATIENT
Start: 2018-01-16 | End: 2018-01-16 | Stop reason: SDUPTHER

## 2018-01-16 RX ORDER — SIMVASTATIN 20 MG
20 TABLET ORAL NIGHTLY
Status: DISCONTINUED | OUTPATIENT
Start: 2018-01-16 | End: 2018-01-18 | Stop reason: HOSPADM

## 2018-01-16 RX ORDER — ACETAMINOPHEN 500 MG
1000 TABLET ORAL EVERY 6 HOURS
Status: DISCONTINUED | OUTPATIENT
Start: 2018-01-16 | End: 2018-01-18 | Stop reason: HOSPADM

## 2018-01-16 RX ORDER — NICOTINE POLACRILEX 4 MG
15 LOZENGE BUCCAL PRN
Status: DISCONTINUED | OUTPATIENT
Start: 2018-01-16 | End: 2018-01-18 | Stop reason: HOSPADM

## 2018-01-16 RX ORDER — SODIUM CHLORIDE 9 MG/ML
INJECTION, SOLUTION INTRAVENOUS CONTINUOUS
Status: DISCONTINUED | OUTPATIENT
Start: 2018-01-16 | End: 2018-01-18 | Stop reason: HOSPADM

## 2018-01-16 RX ORDER — DEXTROSE AND SODIUM CHLORIDE 5; .9 G/100ML; G/100ML
100 INJECTION, SOLUTION INTRAVENOUS PRN
Status: DISCONTINUED | OUTPATIENT
Start: 2018-01-16 | End: 2018-01-18 | Stop reason: HOSPADM

## 2018-01-16 RX ORDER — OXYCODONE HYDROCHLORIDE 5 MG/1
5 TABLET ORAL EVERY 6 HOURS
Status: DISCONTINUED | OUTPATIENT
Start: 2018-01-16 | End: 2018-01-17

## 2018-01-16 RX ADMIN — FENTANYL CITRATE 50 MCG: 50 INJECTION, SOLUTION INTRAMUSCULAR; INTRAVENOUS at 10:16

## 2018-01-16 RX ADMIN — VANCOMYCIN HYDROCHLORIDE 1000 MG: 1 INJECTION, POWDER, LYOPHILIZED, FOR SOLUTION INTRAVENOUS at 09:53

## 2018-01-16 RX ADMIN — HYDROMORPHONE HYDROCHLORIDE 0.5 MG: 1 INJECTION, SOLUTION INTRAMUSCULAR; INTRAVENOUS; SUBCUTANEOUS at 12:10

## 2018-01-16 RX ADMIN — FENTANYL CITRATE 50 MCG: 50 INJECTION, SOLUTION INTRAMUSCULAR; INTRAVENOUS at 10:13

## 2018-01-16 RX ADMIN — PHENYLEPHRINE HYDROCHLORIDE 100 MCG: 10 INJECTION INTRAMUSCULAR; INTRAVENOUS; SUBCUTANEOUS at 11:00

## 2018-01-16 RX ADMIN — PHENYLEPHRINE HYDROCHLORIDE 100 MCG: 10 INJECTION INTRAMUSCULAR; INTRAVENOUS; SUBCUTANEOUS at 10:36

## 2018-01-16 RX ADMIN — BACLOFEN 20 MG: 10 TABLET ORAL at 21:21

## 2018-01-16 RX ADMIN — SUCCINYLCHOLINE CHLORIDE 140 MG: 20 INJECTION, SOLUTION INTRAMUSCULAR; INTRAVENOUS at 09:30

## 2018-01-16 RX ADMIN — Medication 50 MG: at 09:40

## 2018-01-16 RX ADMIN — HYDRALAZINE HYDROCHLORIDE 25 MG: 25 TABLET, FILM COATED ORAL at 21:20

## 2018-01-16 RX ADMIN — MORPHINE SULFATE 2 MG: 2 INJECTION, SOLUTION INTRAMUSCULAR; INTRAVENOUS at 14:34

## 2018-01-16 RX ADMIN — DIAZEPAM 5 MG: 5 TABLET ORAL at 14:18

## 2018-01-16 RX ADMIN — GLYCOPYRROLATE 0.1 MG: 0.2 INJECTION, SOLUTION INTRAMUSCULAR; INTRAVENOUS at 09:29

## 2018-01-16 RX ADMIN — EPHEDRINE SULFATE 5 MG: 50 INJECTION, SOLUTION INTRAVENOUS at 10:43

## 2018-01-16 RX ADMIN — PROPOFOL 200 MG: 10 INJECTION, EMULSION INTRAVENOUS at 09:30

## 2018-01-16 RX ADMIN — AMIODARONE HYDROCHLORIDE 200 MG: 200 TABLET ORAL at 21:21

## 2018-01-16 RX ADMIN — HYDROMORPHONE HYDROCHLORIDE 0.25 MG: 1 INJECTION, SOLUTION INTRAMUSCULAR; INTRAVENOUS; SUBCUTANEOUS at 11:28

## 2018-01-16 RX ADMIN — FENTANYL CITRATE 50 MCG: 50 INJECTION, SOLUTION INTRAMUSCULAR; INTRAVENOUS at 14:10

## 2018-01-16 RX ADMIN — OXYCODONE HYDROCHLORIDE 5 MG: 5 TABLET ORAL at 21:20

## 2018-01-16 RX ADMIN — ONDANSETRON 4 MG: 2 INJECTION INTRAMUSCULAR; INTRAVENOUS at 11:50

## 2018-01-16 RX ADMIN — Medication 10 ML: at 21:26

## 2018-01-16 RX ADMIN — GLYCOPYRROLATE 0.1 MG: 0.2 INJECTION, SOLUTION INTRAMUSCULAR; INTRAVENOUS at 09:22

## 2018-01-16 RX ADMIN — MORPHINE SULFATE 2 MG: 2 INJECTION, SOLUTION INTRAMUSCULAR; INTRAVENOUS at 14:54

## 2018-01-16 RX ADMIN — FENTANYL CITRATE 50 MCG: 50 INJECTION, SOLUTION INTRAMUSCULAR; INTRAVENOUS at 13:16

## 2018-01-16 RX ADMIN — METOPROLOL TARTRATE 50 MG: 50 TABLET, FILM COATED ORAL at 21:20

## 2018-01-16 RX ADMIN — FENTANYL CITRATE 100 MCG: 50 INJECTION, SOLUTION INTRAMUSCULAR; INTRAVENOUS at 09:22

## 2018-01-16 RX ADMIN — SODIUM CHLORIDE: 9 INJECTION, SOLUTION INTRAVENOUS at 07:30

## 2018-01-16 RX ADMIN — FENTANYL CITRATE 50 MCG: 50 INJECTION, SOLUTION INTRAMUSCULAR; INTRAVENOUS at 13:27

## 2018-01-16 RX ADMIN — EPHEDRINE SULFATE 5 MG: 50 INJECTION, SOLUTION INTRAVENOUS at 11:05

## 2018-01-16 RX ADMIN — OXYCODONE HYDROCHLORIDE 5 MG: 5 TABLET ORAL at 15:15

## 2018-01-16 RX ADMIN — MORPHINE SULFATE 2 MG: 2 INJECTION, SOLUTION INTRAMUSCULAR; INTRAVENOUS at 15:06

## 2018-01-16 RX ADMIN — HYDROMORPHONE HYDROCHLORIDE 0.5 MG: 1 INJECTION, SOLUTION INTRAMUSCULAR; INTRAVENOUS; SUBCUTANEOUS at 11:42

## 2018-01-16 RX ADMIN — FENTANYL CITRATE 50 MCG: 50 INJECTION, SOLUTION INTRAMUSCULAR; INTRAVENOUS at 11:30

## 2018-01-16 RX ADMIN — MEXILETINE HYDROCHLORIDE 300 MG: 150 CAPSULE ORAL at 21:20

## 2018-01-16 RX ADMIN — FINASTERIDE 5 MG: 5 TABLET, FILM COATED ORAL at 19:10

## 2018-01-16 RX ADMIN — SODIUM CHLORIDE, POTASSIUM CHLORIDE, SODIUM LACTATE AND CALCIUM CHLORIDE: 600; 310; 30; 20 INJECTION, SOLUTION INTRAVENOUS at 12:30

## 2018-01-16 RX ADMIN — HYDROMORPHONE HYDROCHLORIDE 0.5 MG: 1 INJECTION, SOLUTION INTRAMUSCULAR; INTRAVENOUS; SUBCUTANEOUS at 12:24

## 2018-01-16 RX ADMIN — MINERAL OIL AND WHITE PETROLATUM 1 INCH: 150; 830 OINTMENT OPHTHALMIC at 09:31

## 2018-01-16 RX ADMIN — EPHEDRINE SULFATE 5 MG: 50 INJECTION, SOLUTION INTRAVENOUS at 11:00

## 2018-01-16 RX ADMIN — HYDROMORPHONE HYDROCHLORIDE 0.25 MG: 1 INJECTION, SOLUTION INTRAMUSCULAR; INTRAVENOUS; SUBCUTANEOUS at 11:19

## 2018-01-16 RX ADMIN — LIDOCAINE HYDROCHLORIDE 100 MG: 20 INJECTION, SOLUTION EPIDURAL; INFILTRATION; INTRACAUDAL; PERINEURAL at 09:30

## 2018-01-16 RX ADMIN — SODIUM CHLORIDE: 9 INJECTION, SOLUTION INTRAVENOUS at 09:22

## 2018-01-16 RX ADMIN — SACUBITRIL AND VALSARTAN 1 TABLET: 49; 51 TABLET, FILM COATED ORAL at 21:20

## 2018-01-16 RX ADMIN — TAMSULOSIN HYDROCHLORIDE 0.4 MG: 0.4 CAPSULE ORAL at 19:10

## 2018-01-16 RX ADMIN — MORPHINE SULFATE 2 MG: 2 INJECTION, SOLUTION INTRAMUSCULAR; INTRAVENOUS at 15:40

## 2018-01-16 RX ADMIN — DIAZEPAM 5 MG: 5 TABLET ORAL at 21:20

## 2018-01-16 RX ADMIN — EPHEDRINE SULFATE 5 MG: 50 INJECTION, SOLUTION INTRAVENOUS at 10:50

## 2018-01-16 RX ADMIN — FUROSEMIDE 40 MG: 40 TABLET ORAL at 19:10

## 2018-01-16 RX ADMIN — POTASSIUM CHLORIDE, DEXTROSE MONOHYDRATE AND SODIUM CHLORIDE: 150; 5; 450 INJECTION, SOLUTION INTRAVENOUS at 18:15

## 2018-01-16 RX ADMIN — HYDROMORPHONE HYDROCHLORIDE 0.5 MG: 1 INJECTION, SOLUTION INTRAMUSCULAR; INTRAVENOUS; SUBCUTANEOUS at 23:24

## 2018-01-16 RX ADMIN — MIDAZOLAM 2 MG: 1 INJECTION INTRAMUSCULAR; INTRAVENOUS at 09:22

## 2018-01-16 RX ADMIN — SIMVASTATIN 20 MG: 20 TABLET, FILM COATED ORAL at 21:20

## 2018-01-16 RX ADMIN — HYDROMORPHONE HYDROCHLORIDE 0.5 MG: 1 INJECTION, SOLUTION INTRAMUSCULAR; INTRAVENOUS; SUBCUTANEOUS at 19:24

## 2018-01-16 RX ADMIN — ACETAMINOPHEN 1000 MG: 500 TABLET ORAL at 21:20

## 2018-01-16 RX ADMIN — PHENYLEPHRINE HYDROCHLORIDE 100 MCG: 10 INJECTION INTRAMUSCULAR; INTRAVENOUS; SUBCUTANEOUS at 10:32

## 2018-01-16 RX ADMIN — PHENYLEPHRINE HYDROCHLORIDE 100 MCG: 10 INJECTION INTRAMUSCULAR; INTRAVENOUS; SUBCUTANEOUS at 10:50

## 2018-01-16 RX ADMIN — PHENYLEPHRINE HYDROCHLORIDE 200 MCG: 10 INJECTION INTRAMUSCULAR; INTRAVENOUS; SUBCUTANEOUS at 11:21

## 2018-01-16 RX ADMIN — ALBUMIN (HUMAN) 250 ML: 12.5 INJECTION, SOLUTION INTRAVENOUS at 11:10

## 2018-01-16 RX ADMIN — FENTANYL CITRATE 50 MCG: 50 INJECTION, SOLUTION INTRAMUSCULAR; INTRAVENOUS at 13:41

## 2018-01-16 RX ADMIN — POTASSIUM CHLORIDE 20 MEQ: 1500 TABLET, EXTENDED RELEASE ORAL at 21:20

## 2018-01-16 ASSESSMENT — PULMONARY FUNCTION TESTS
PIF_VALUE: 19
PIF_VALUE: 20
PIF_VALUE: 21
PIF_VALUE: 19
PIF_VALUE: 21
PIF_VALUE: 22
PIF_VALUE: 21
PIF_VALUE: 28
PIF_VALUE: 16
PIF_VALUE: 28
PIF_VALUE: 28
PIF_VALUE: 16
PIF_VALUE: 16
PIF_VALUE: 28
PIF_VALUE: 20
PIF_VALUE: 16
PIF_VALUE: 10
PIF_VALUE: 20
PIF_VALUE: 20
PIF_VALUE: 16
PIF_VALUE: 21
PIF_VALUE: 20
PIF_VALUE: 21
PIF_VALUE: 20
PIF_VALUE: 21
PIF_VALUE: 22
PIF_VALUE: 20
PIF_VALUE: 22
PIF_VALUE: 30
PIF_VALUE: 30
PIF_VALUE: 20
PIF_VALUE: 16
PIF_VALUE: 16
PIF_VALUE: 2
PIF_VALUE: 16
PIF_VALUE: 23
PIF_VALUE: 28
PIF_VALUE: 21
PIF_VALUE: 20
PIF_VALUE: 16
PIF_VALUE: 16
PIF_VALUE: 19
PIF_VALUE: 21
PIF_VALUE: 16
PIF_VALUE: 20
PIF_VALUE: 25
PIF_VALUE: 24
PIF_VALUE: 20
PIF_VALUE: 16
PIF_VALUE: 1
PIF_VALUE: 21
PIF_VALUE: 22
PIF_VALUE: 21
PIF_VALUE: 16
PIF_VALUE: 20
PIF_VALUE: 20
PIF_VALUE: 21
PIF_VALUE: 1
PIF_VALUE: 20
PIF_VALUE: 2
PIF_VALUE: 23
PIF_VALUE: 19
PIF_VALUE: 23
PIF_VALUE: 22
PIF_VALUE: 21
PIF_VALUE: 24
PIF_VALUE: 19
PIF_VALUE: 21
PIF_VALUE: 21
PIF_VALUE: 20
PIF_VALUE: 11
PIF_VALUE: 30
PIF_VALUE: 21
PIF_VALUE: 16
PIF_VALUE: 21
PIF_VALUE: 16
PIF_VALUE: 22
PIF_VALUE: 16
PIF_VALUE: 4
PIF_VALUE: 20
PIF_VALUE: 31
PIF_VALUE: 22
PIF_VALUE: 21
PIF_VALUE: 29
PIF_VALUE: 29
PIF_VALUE: 22
PIF_VALUE: 22
PIF_VALUE: 28
PIF_VALUE: 23
PIF_VALUE: 8
PIF_VALUE: 27
PIF_VALUE: 23
PIF_VALUE: 21
PIF_VALUE: 21
PIF_VALUE: 8
PIF_VALUE: 29
PIF_VALUE: 25
PIF_VALUE: 21
PIF_VALUE: 28
PIF_VALUE: 1
PIF_VALUE: 16
PIF_VALUE: 0
PIF_VALUE: 27
PIF_VALUE: 21
PIF_VALUE: 16
PIF_VALUE: 20
PIF_VALUE: 21
PIF_VALUE: 19
PIF_VALUE: 30
PIF_VALUE: 22
PIF_VALUE: 20
PIF_VALUE: 16
PIF_VALUE: 23
PIF_VALUE: 22
PIF_VALUE: 16
PIF_VALUE: 20
PIF_VALUE: 20
PIF_VALUE: 16
PIF_VALUE: 16
PIF_VALUE: 22
PIF_VALUE: 22
PIF_VALUE: 16
PIF_VALUE: 16
PIF_VALUE: 27
PIF_VALUE: 16
PIF_VALUE: 22
PIF_VALUE: 25
PIF_VALUE: 30
PIF_VALUE: 20
PIF_VALUE: 7
PIF_VALUE: 27
PIF_VALUE: 16
PIF_VALUE: 21
PIF_VALUE: 8
PIF_VALUE: 16
PIF_VALUE: 21
PIF_VALUE: 21
PIF_VALUE: 18
PIF_VALUE: 20
PIF_VALUE: 9
PIF_VALUE: 22
PIF_VALUE: 8
PIF_VALUE: 21
PIF_VALUE: 19
PIF_VALUE: 16
PIF_VALUE: 21
PIF_VALUE: 19
PIF_VALUE: 23
PIF_VALUE: 16
PIF_VALUE: 24
PIF_VALUE: 20
PIF_VALUE: 22
PIF_VALUE: 20
PIF_VALUE: 21
PIF_VALUE: 21
PIF_VALUE: 8
PIF_VALUE: 16
PIF_VALUE: 22
PIF_VALUE: 20
PIF_VALUE: 26
PIF_VALUE: 16
PIF_VALUE: 16
PIF_VALUE: 1
PIF_VALUE: 16
PIF_VALUE: 20
PIF_VALUE: 20
PIF_VALUE: 6
PIF_VALUE: 16
PIF_VALUE: 16
PIF_VALUE: 19
PIF_VALUE: 28
PIF_VALUE: 22
PIF_VALUE: 19
PIF_VALUE: 6
PIF_VALUE: 21
PIF_VALUE: 22
PIF_VALUE: 16
PIF_VALUE: 16
PIF_VALUE: 20
PIF_VALUE: 32
PIF_VALUE: 20
PIF_VALUE: 20
PIF_VALUE: 16
PIF_VALUE: 22
PIF_VALUE: 19
PIF_VALUE: 21
PIF_VALUE: 24
PIF_VALUE: 9
PIF_VALUE: 23
PIF_VALUE: 25
PIF_VALUE: 8
PIF_VALUE: 16
PIF_VALUE: 16
PIF_VALUE: 29
PIF_VALUE: 16
PIF_VALUE: 20
PIF_VALUE: 28
PIF_VALUE: 30
PIF_VALUE: 29
PIF_VALUE: 26
PIF_VALUE: 22
PIF_VALUE: 27
PIF_VALUE: 16
PIF_VALUE: 23
PIF_VALUE: 20
PIF_VALUE: 16
PIF_VALUE: 20
PIF_VALUE: 21
PIF_VALUE: 11

## 2018-01-16 ASSESSMENT — PAIN SCALES - GENERAL
PAINLEVEL_OUTOF10: 3
PAINLEVEL_OUTOF10: 8
PAINLEVEL_OUTOF10: 7
PAINLEVEL_OUTOF10: 9
PAINLEVEL_OUTOF10: 6
PAINLEVEL_OUTOF10: 7
PAINLEVEL_OUTOF10: 6
PAINLEVEL_OUTOF10: 8
PAINLEVEL_OUTOF10: 7
PAINLEVEL_OUTOF10: 8
PAINLEVEL_OUTOF10: 8
PAINLEVEL_OUTOF10: 4
PAINLEVEL_OUTOF10: 7

## 2018-01-16 ASSESSMENT — PAIN DESCRIPTION - ONSET
ONSET: ON-GOING

## 2018-01-16 ASSESSMENT — PAIN DESCRIPTION - PROGRESSION
CLINICAL_PROGRESSION: NOT CHANGED
CLINICAL_PROGRESSION: NOT CHANGED
CLINICAL_PROGRESSION: GRADUALLY IMPROVING
CLINICAL_PROGRESSION: GRADUALLY IMPROVING
CLINICAL_PROGRESSION: NOT CHANGED

## 2018-01-16 ASSESSMENT — PAIN DESCRIPTION - ORIENTATION
ORIENTATION: MID
ORIENTATION: LEFT;LOWER
ORIENTATION: MID

## 2018-01-16 ASSESSMENT — PAIN DESCRIPTION - FREQUENCY
FREQUENCY: CONTINUOUS

## 2018-01-16 ASSESSMENT — PAIN DESCRIPTION - DESCRIPTORS
DESCRIPTORS: DISCOMFORT;CONSTANT;SHARP
DESCRIPTORS: DISCOMFORT
DESCRIPTORS: DISCOMFORT

## 2018-01-16 ASSESSMENT — PAIN DESCRIPTION - PAIN TYPE
TYPE: SURGICAL PAIN

## 2018-01-16 ASSESSMENT — PAIN DESCRIPTION - LOCATION
LOCATION: BACK

## 2018-01-16 ASSESSMENT — PAIN - FUNCTIONAL ASSESSMENT: PAIN_FUNCTIONAL_ASSESSMENT: 0-10

## 2018-01-16 NOTE — ANESTHESIA PROCEDURE NOTES
Arterial Line:    An arterial line was placed using surface landmarks, in the OR for the following indication(s): continuous blood pressure monitoring and blood sampling needed. A 20 gauge (size), 1 and 1/4 inch (length), Arrow (type) catheter was placed, Seldinger technique used, into the left radial artery, secured by suture, tape and Tegaderm. Anesthesia type: General    Events:  patient tolerated procedure well with no complications.   1/16/2018 9:33 AM1/16/2018 9:37 AM  Anesthesiologist: Paco Pandey  Preanesthetic Checklist  Completed: patient identified, site marked, surgical consent, pre-op evaluation, timeout performed, IV checked, risks and benefits discussed, monitors and equipment checked, anesthesia consent given, oxygen available and patient being monitored
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
## 2018-01-16 NOTE — H&P
[] Encourage ambulation           [] Already on Anticoagulation      Code Status: Full Code    PT/OT Eval Status: Active and ongoing      ASSESSMENT:    Active Hospital Problems    Diagnosis Date Noted    Lumbar spine tumor [D49.2] 01/16/2018       PLAN:    1. Hx of CAD  -Will review MAR and resume home medications    2. Ischemic cardiomyopathy with ICD/chronic systolic and diastolic CHF    -Resume home meds  -Avoid excessive IVF's  - Echo 12/21/17 EF 68%, grade 1 diastolic dysfxn    3. PAF   - HR control on po amio, BB   -AC with Coumadin, on hold due to surgical intervention. Recommend pharmacy consult to restart it as soon as Neurosurgery service gives the [id number]  -No need to bridge    4. Type 2 DM  -Accuchecks per protocol  -Hold all oral anti glycemics  -Short and long acting insulin with target BG of 140-180    5. Spine tumor  -s/p T12-L1 partial bilateral laminectomy and removal of intradural extramedullary filum terminale tumor   -F/U by NSx             Thank you for the consultation.     Electronically signed by Ana Reese MD on 1/16/2018 at 5:06 PM

## 2018-01-16 NOTE — OP NOTE
able to completely remove the lesion. The lesion was seen to be originating from the filum that was also sharply sectioned. There was a yellowish stain surrounding the midline nerve roots that was also removed. The nerve roots were all preserved and their stimulation gave rise to responses in the S1-S2 innervated muscles bilaterally. We then proceeded to inspect the operative field and were satisfied that there was no abnormal tissue. As we did not have a definitive frozen section diagnosis , we did not feel it was wise to operate on the Sacral lesion at this time. The dura was closed using 4o running reinforced by fibrin glue, augmented by Verlee Maribel held in place with more fibrin glue. Muscle, fascia and skin were closed as per routine. EMG monitoring was quiet throughout the procedure.

## 2018-01-17 LAB
ANION GAP SERPL CALCULATED.3IONS-SCNC: 9 MEQ/L (ref 8–16)
ANISOCYTOSIS: ABNORMAL
BASOPHILS # BLD: 0.5 %
BASOPHILS ABSOLUTE: 0.1 THOU/MM3 (ref 0–0.1)
BUN BLDV-MCNC: 19 MG/DL (ref 7–22)
CALCIUM SERPL-MCNC: 8.4 MG/DL (ref 8.5–10.5)
CHLORIDE BLD-SCNC: 103 MEQ/L (ref 98–111)
CO2: 24 MEQ/L (ref 23–33)
CREAT SERPL-MCNC: 1.6 MG/DL (ref 0.4–1.2)
EOSINOPHIL # BLD: 1.2 %
EOSINOPHILS ABSOLUTE: 0.1 THOU/MM3 (ref 0–0.4)
GFR SERPL CREATININE-BSD FRML MDRD: 45 ML/MIN/1.73M2
GLUCOSE BLD-MCNC: 125 MG/DL (ref 70–108)
GLUCOSE BLD-MCNC: 176 MG/DL (ref 70–108)
GLUCOSE BLD-MCNC: 193 MG/DL (ref 70–108)
GLUCOSE BLD-MCNC: 217 MG/DL (ref 70–108)
HCT VFR BLD CALC: 34.3 % (ref 42–52)
HEMOGLOBIN: 11.5 GM/DL (ref 14–18)
LYMPHOCYTES # BLD: 9 %
LYMPHOCYTES ABSOLUTE: 0.9 THOU/MM3 (ref 1–4.8)
MCH RBC QN AUTO: 33 PG (ref 27–31)
MCHC RBC AUTO-ENTMCNC: 33.5 GM/DL (ref 33–37)
MCV RBC AUTO: 98.2 FL (ref 80–94)
MONOCYTES # BLD: 8.1 %
MONOCYTES ABSOLUTE: 0.9 THOU/MM3 (ref 0.4–1.3)
NUCLEATED RED BLOOD CELLS: 0 /100 WBC
PDW BLD-RTO: 16.1 % (ref 11.5–14.5)
PLATELET # BLD: 179 THOU/MM3 (ref 130–400)
PMV BLD AUTO: 7.9 MCM (ref 7.4–10.4)
POTASSIUM SERPL-SCNC: 4.8 MEQ/L (ref 3.5–5.2)
RBC # BLD: 3.49 MILL/MM3 (ref 4.7–6.1)
SEG NEUTROPHILS: 81.2 %
SEGMENTED NEUTROPHILS ABSOLUTE COUNT: 8.5 THOU/MM3 (ref 1.8–7.7)
SODIUM BLD-SCNC: 136 MEQ/L (ref 135–145)
WBC # BLD: 10.5 THOU/MM3 (ref 4.8–10.8)

## 2018-01-17 PROCEDURE — 6370000000 HC RX 637 (ALT 250 FOR IP): Performed by: HOSPITALIST

## 2018-01-17 PROCEDURE — G8988 SELF CARE GOAL STATUS: HCPCS

## 2018-01-17 PROCEDURE — 97535 SELF CARE MNGMENT TRAINING: CPT

## 2018-01-17 PROCEDURE — 99232 SBSQ HOSP IP/OBS MODERATE 35: CPT | Performed by: INTERNAL MEDICINE

## 2018-01-17 PROCEDURE — 97162 PT EVAL MOD COMPLEX 30 MIN: CPT

## 2018-01-17 PROCEDURE — 1210000002 HC MED SURG R&B - NEUROSCIENCE

## 2018-01-17 PROCEDURE — 82948 REAGENT STRIP/BLOOD GLUCOSE: CPT

## 2018-01-17 PROCEDURE — 6370000000 HC RX 637 (ALT 250 FOR IP): Performed by: NURSE PRACTITIONER

## 2018-01-17 PROCEDURE — 2580000003 HC RX 258: Performed by: NURSE PRACTITIONER

## 2018-01-17 PROCEDURE — G8987 SELF CARE CURRENT STATUS: HCPCS

## 2018-01-17 PROCEDURE — 80048 BASIC METABOLIC PNL TOTAL CA: CPT

## 2018-01-17 PROCEDURE — 99024 POSTOP FOLLOW-UP VISIT: CPT | Performed by: NURSE PRACTITIONER

## 2018-01-17 PROCEDURE — 51701 INSERT BLADDER CATHETER: CPT

## 2018-01-17 PROCEDURE — G8978 MOBILITY CURRENT STATUS: HCPCS

## 2018-01-17 PROCEDURE — 97116 GAIT TRAINING THERAPY: CPT

## 2018-01-17 PROCEDURE — 85025 COMPLETE CBC W/AUTO DIFF WBC: CPT

## 2018-01-17 PROCEDURE — G8979 MOBILITY GOAL STATUS: HCPCS

## 2018-01-17 PROCEDURE — 51798 US URINE CAPACITY MEASURE: CPT

## 2018-01-17 PROCEDURE — 97165 OT EVAL LOW COMPLEX 30 MIN: CPT

## 2018-01-17 PROCEDURE — 36415 COLL VENOUS BLD VENIPUNCTURE: CPT

## 2018-01-17 PROCEDURE — 6370000000 HC RX 637 (ALT 250 FOR IP): Performed by: NEUROLOGICAL SURGERY

## 2018-01-17 RX ORDER — LACTULOSE 10 G/15ML
20 SOLUTION ORAL ONCE
Status: DISCONTINUED | OUTPATIENT
Start: 2018-01-17 | End: 2018-01-17 | Stop reason: CLARIF

## 2018-01-17 RX ORDER — OXYCODONE HYDROCHLORIDE 5 MG/1
5 TABLET ORAL EVERY 4 HOURS PRN
Status: DISCONTINUED | OUTPATIENT
Start: 2018-01-17 | End: 2018-01-18 | Stop reason: HOSPADM

## 2018-01-17 RX ADMIN — MEXILETINE HYDROCHLORIDE 300 MG: 150 CAPSULE ORAL at 05:27

## 2018-01-17 RX ADMIN — OXYCODONE HYDROCHLORIDE 5 MG: 5 TABLET ORAL at 23:41

## 2018-01-17 RX ADMIN — OXYCODONE HYDROCHLORIDE 5 MG: 5 TABLET ORAL at 03:22

## 2018-01-17 RX ADMIN — Medication 10 ML: at 22:04

## 2018-01-17 RX ADMIN — ACETAMINOPHEN 1000 MG: 500 TABLET ORAL at 08:40

## 2018-01-17 RX ADMIN — Medication 1 UNITS: at 05:27

## 2018-01-17 RX ADMIN — FINASTERIDE 5 MG: 5 TABLET, FILM COATED ORAL at 08:40

## 2018-01-17 RX ADMIN — HYDROCODONE BITARTRATE AND ACETAMINOPHEN 1 TABLET: 7.5; 325 TABLET ORAL at 01:49

## 2018-01-17 RX ADMIN — OXYCODONE HYDROCHLORIDE 5 MG: 5 TABLET ORAL at 19:27

## 2018-01-17 RX ADMIN — AMIODARONE HYDROCHLORIDE 200 MG: 200 TABLET ORAL at 21:58

## 2018-01-17 RX ADMIN — DIAZEPAM 5 MG: 5 TABLET ORAL at 08:40

## 2018-01-17 RX ADMIN — OXYCODONE HYDROCHLORIDE 5 MG: 5 TABLET ORAL at 15:09

## 2018-01-17 RX ADMIN — METOPROLOL TARTRATE 50 MG: 50 TABLET, FILM COATED ORAL at 08:40

## 2018-01-17 RX ADMIN — ACETAMINOPHEN 1000 MG: 500 TABLET ORAL at 03:22

## 2018-01-17 RX ADMIN — BACLOFEN 20 MG: 10 TABLET ORAL at 22:04

## 2018-01-17 RX ADMIN — MEXILETINE HYDROCHLORIDE 300 MG: 150 CAPSULE ORAL at 22:03

## 2018-01-17 RX ADMIN — BACLOFEN 20 MG: 10 TABLET ORAL at 08:40

## 2018-01-17 RX ADMIN — SACUBITRIL AND VALSARTAN 1 TABLET: 49; 51 TABLET, FILM COATED ORAL at 08:40

## 2018-01-17 RX ADMIN — POTASSIUM CHLORIDE 20 MEQ: 1500 TABLET, EXTENDED RELEASE ORAL at 08:40

## 2018-01-17 RX ADMIN — HYDRALAZINE HYDROCHLORIDE 25 MG: 25 TABLET, FILM COATED ORAL at 05:27

## 2018-01-17 RX ADMIN — SIMVASTATIN 20 MG: 20 TABLET, FILM COATED ORAL at 22:02

## 2018-01-17 RX ADMIN — POTASSIUM CHLORIDE 20 MEQ: 1500 TABLET, EXTENDED RELEASE ORAL at 22:02

## 2018-01-17 RX ADMIN — MEXILETINE HYDROCHLORIDE 300 MG: 150 CAPSULE ORAL at 13:37

## 2018-01-17 RX ADMIN — OXYCODONE HYDROCHLORIDE 5 MG: 5 TABLET ORAL at 08:45

## 2018-01-17 RX ADMIN — AMIODARONE HYDROCHLORIDE 200 MG: 200 TABLET ORAL at 08:40

## 2018-01-17 RX ADMIN — SACUBITRIL AND VALSARTAN 1 TABLET: 49; 51 TABLET, FILM COATED ORAL at 22:02

## 2018-01-17 RX ADMIN — METOPROLOL TARTRATE 50 MG: 50 TABLET, FILM COATED ORAL at 22:03

## 2018-01-17 RX ADMIN — TAMSULOSIN HYDROCHLORIDE 0.4 MG: 0.4 CAPSULE ORAL at 08:40

## 2018-01-17 RX ADMIN — DIAZEPAM 5 MG: 5 TABLET ORAL at 22:02

## 2018-01-17 RX ADMIN — HYDRALAZINE HYDROCHLORIDE 25 MG: 25 TABLET, FILM COATED ORAL at 22:03

## 2018-01-17 RX ADMIN — ACETAMINOPHEN 1000 MG: 500 TABLET ORAL at 21:56

## 2018-01-17 RX ADMIN — FUROSEMIDE 40 MG: 40 TABLET ORAL at 08:40

## 2018-01-17 RX ADMIN — HYDRALAZINE HYDROCHLORIDE 25 MG: 25 TABLET, FILM COATED ORAL at 13:37

## 2018-01-17 RX ADMIN — BACLOFEN 20 MG: 10 TABLET ORAL at 13:37

## 2018-01-17 ASSESSMENT — PAIN SCALES - GENERAL
PAINLEVEL_OUTOF10: 8
PAINLEVEL_OUTOF10: 7
PAINLEVEL_OUTOF10: 9
PAINLEVEL_OUTOF10: 10
PAINLEVEL_OUTOF10: 8
PAINLEVEL_OUTOF10: 7
PAINLEVEL_OUTOF10: 7
PAINLEVEL_OUTOF10: 6
PAINLEVEL_OUTOF10: 7
PAINLEVEL_OUTOF10: 8
PAINLEVEL_OUTOF10: 8

## 2018-01-17 ASSESSMENT — PAIN DESCRIPTION - ORIENTATION
ORIENTATION: MID

## 2018-01-17 ASSESSMENT — PAIN DESCRIPTION - DESCRIPTORS
DESCRIPTORS: ACHING;DISCOMFORT
DESCRIPTORS: ACHING
DESCRIPTORS: DISCOMFORT
DESCRIPTORS: ACHING;DISCOMFORT

## 2018-01-17 ASSESSMENT — PAIN DESCRIPTION - LOCATION
LOCATION: BACK

## 2018-01-17 ASSESSMENT — PAIN DESCRIPTION - ONSET
ONSET: ON-GOING

## 2018-01-17 ASSESSMENT — PAIN DESCRIPTION - PAIN TYPE
TYPE: SURGICAL PAIN

## 2018-01-17 ASSESSMENT — PAIN DESCRIPTION - FREQUENCY
FREQUENCY: CONTINUOUS

## 2018-01-17 ASSESSMENT — PAIN DESCRIPTION - PROGRESSION
CLINICAL_PROGRESSION: NOT CHANGED
CLINICAL_PROGRESSION: NOT CHANGED

## 2018-01-17 NOTE — CARE COORDINATION
DISCHARGE BARRIERS  1/17/18, 11:36 AM    Reason for Referral: \"Discharge planning, HH\"  Mental Status: Alert and oriented  Decision Making: Makes own decisions  Family/Social/Home Environment: Assessment completed with patient-  Patient is from home alone. Patient has children in the area who are able to assist as needed. Patient was independent with personal care, housekeeping, and cooking. Patient works full-time at [employer]: None  Current Equipment: None  Payment Source: [facility]  Concerns or Barriers to Discharge: None  Collabrative List of ECF/HH were provided:Offered, provided [facility] list    Teach Back Method used with patient regarding care plan and discharge planning  Patient verbalize understanding of the plan of care and contribute to goal setting. Anticipated Needs/Discharge Plan: Patient plans return home alone with new HH. Provided [facility] list.  Patient will review and tell SW what agency he wants prior to discharge.      Electronically signed by MICAH Cordova on 1/17/2018 at 11:36 AM

## 2018-01-17 NOTE — PROGRESS NOTES
from legs       Restrictions/Precautions:  Restrictions/Precautions: Fall Risk, General Precautions, Surgical Protocols    Position Activity Restriction  Spinal Precautions: No Bending, No Lifting, No Twisting  Spinal Precautions: sx 1/16/18         Subjective:  Chart Reviewed: Yes  Patient assessed for rehabilitation services?: Yes  Family / Caregiver Present: No  Subjective: RN approved session. Patient sitting up in bedside chair. States he was tired from not getting much sleep last night but agreeable to session. General:  Overall Orientation Status: Within Functional Limits  Follows Commands: Within Functional Limits    Vision: Within Functional Limits    Hearing: Within functional limits         Pain:  Yes. Pain Assessment  Pain Assessment: 0-10  Pain Level: 7 (mid back)       Social/Functional History:    Lives With: Alone  Type of Home: House  Home Layout: One level  Home Access: Stairs to enter without rails  Entrance Stairs - Number of Steps: 1 + 1 HERO. 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 States he did return to work after being in the hospital for his back pain, but returned to less physical labor as he was unable. Pt reports his sister can assist at discharge, but she is on disability herself so unsure how much physical help she will be able to complete.      Objective:  ROM RLE: AROM WFL  ROM LLE: AROM WFL    Strength RLE: WFL  Comment: grossly 4/5  Strength LLE: VERO.     Evaluation Complexity: Based on the findings of patient history, examination, clinical presentation, and decision making during this evaluation, the evaluation of Meriel Primrose  is of medium complexity. PT G-Codes  Functional Limitation: Mobility: Walking and moving around  Mobility: Walking and Moving Around Current Status (): At least 40 percent but less than 60 percent impaired, limited or restricted  Mobility: Walking and Moving Around Goal Status ():  At least 20 percent but less than 40 percent impaired, limited or restricted       AM-PAC Inpatient Mobility without Stair Climbing Raw Score : 15  AM-PAC Inpatient without Stair Climbing T-Scale Score : 43.03  Mobility Inpatient CMS 0-100% Score: 47.43  Mobility Inpatient without Stair CMS G-Code Modifier : CK

## 2018-01-17 NOTE — PROGRESS NOTES
CC- Intradural extramedullary tumors at L1 and S2    Post op Day 1   S/P T12-L1 partial bilateral laminectomy, removal of intradural extramedullary filum terminale tumor    Patient is doing well, sitting up in chair and has been walking with PT/OT   Alert and Oriented x3,   Follows commands x 4 extremities  Strength 5/5 and equal throughout  sensory grossly intact     Wound dressing- clean, dry and intact      Lab Results   Component Value Date     01/17/2018    K 4.8 01/17/2018     01/17/2018    CO2 24 01/17/2018    BUN 19 01/17/2018    CREATININE 1.6 01/17/2018    GLUCOSE 125 01/17/2018    CALCIUM 8.4 01/17/2018      Lab Results   Component Value Date    WBC 10.5 01/17/2018    HGB 11.5 (L) 01/17/2018    HCT 34.3 (L) 01/17/2018    MCV 98.2 (H) 01/17/2018     01/17/2018     Blood sugar 193 at 0517 today    A/P:  S/P T12-L1 partial bilateral laminectomy, removal of intradural extramedullary filum terminale tumor  - No acute event overnight, no fever or chills. - patient is doing well, incisional back pain is controlled with tylenol, oxycodone, and valium.    - Pt was unable to urinate last night and a straight cath was ordered with 1,000 ml out. Pt reports that he feels the urge to urinate, he just can't go. Pt is taking PO fluids well and denies any nausea. - PT and OT  - post op MRI with and without contrast ordered  - D/C dilaudid, pt to only have oxycodone 5 mg every 6 hours PRN, with Tylenol 1,000 mg every 6 hours around the clock and valium 5 mg BID. - encourage activity as tolerated.   - possible discharge tomorrow

## 2018-01-17 NOTE — PROGRESS NOTES
Hospitalist Progress Note    Patient:  Gustabo Restrepo      Unit/Bed:4A-15/015-A    YOB: 1965    MRN: [id number]       Acct: [de-identified]     PCP: Leanna Maxwell NP    Date of Admission: 1/16/2018    Chief Complaint: medical mgt of dm    Hospital Course: Pt is a 45 y/o with multiple comorbidities admitted following back tumor resection. Hospitalist consulted for medical mgt. Pt doing ok. Pain control      Subjective: c/o pain       Medications:  Reviewed    Infusion Medications    sodium chloride Stopped (01/16/18 1230)    dextrose 5% and 0.45% NaCl with KCl 20 mEq Stopped (01/17/18 0842)    dextrose 5 % and 0.9 % NaCl       Scheduled Medications    insulin lispro  0-6 Units Subcutaneous 4x Daily AC & HS    sodium chloride flush  10 mL Intravenous 2 times per day    acetaminophen  1,000 mg Oral Q6H    diazepam  5 mg Oral BID    oxyCODONE  5 mg Oral Q6H    amiodarone  200 mg Oral BID    baclofen  20 mg Oral TID    finasteride  5 mg Oral Daily    furosemide  40 mg Oral Daily    hydrALAZINE  25 mg Oral 3 times per day    metoprolol tartrate  50 mg Oral BID    potassium chloride  20 mEq Oral BID    mexiletine  300 mg Oral 3 times per day    sacubitril-valsartan  1 tablet Oral BID    simvastatin  20 mg Oral Nightly    tamsulosin  0.4 mg Oral Daily     PRN Meds: sodium chloride flush, ondansetron, glucose, dextrose, glucagon (rDNA), dextrose 5 % and 0.9 % NaCl      Intake/Output Summary (Last 24 hours) at 01/17/18 1457  Last data filed at 01/17/18 1354   Gross per 24 hour   Intake           862.77 ml   Output             2000 ml   Net         -1137.23 ml       Diet:  DIET GENERAL;    Exam:  /69   Pulse 52   Temp 98.2 °F (36.8 °C) (Oral)   Resp 18   Ht 6' 2\" (1.88 m)   Wt 263 lb (119.3 kg)   SpO2 99%   BMI 33.77 kg/m²     General appearance: No apparent distress, appears stated age and cooperative. HEENT: NC/AT. Conjunctivae/corneas clear.   Neck: Supple, with full range of motion. Respiratory:  Normal respiratory effort. Clear to auscultation,   Cardiovascular: Regular rate and rhythm with normal S1/S2 . Abdomen: Soft, non-tender, non-distended with normal bowel sounds. Musculoskeletal: Full range of motion without deformity. Skin: Skin color, texture, turgor normal.  No rashes or lesions. Neurologic:  Grossly non-focal.  Psychiatric: Alert and oriented, thought content appropriate, normal insight  Capillary Refill: Brisk,< 3 seconds   Peripheral Pulses: +2 palpable, equal bilaterally       Labs:   Recent Labs      01/16/18   1103  01/17/18   0814   WBC   --   10.5   HGB  12.0*  11.5*   HCT   --   34.3*   PLT   --   179     Recent Labs      01/16/18   1105  01/17/18   0814   NA  142  136   K  4.2  4.8   CL   --   103   CO2   --   24   BUN   --   19   CREATININE   --   1.6*   CALCIUM   --   8.4*     No results for input(s): AST, ALT, BILIDIR, BILITOT, ALKPHOS in the last 72 hours. No results for input(s): INR in the last 72 hours. No results for input(s): Dannial Ihsan in the last 72 hours. Urinalysis:      Lab Results   Component Value Date    NITRU NEGATIVE 01/07/2018    WBCUA 10-15 01/07/2018    BACTERIA NONE 01/07/2018    RBCUA 3-5 01/07/2018    BLOODU NEGATIVE 01/07/2018    SPECGRAV >1.030 12/20/2017    GLUCOSEU NEGATIVE 01/07/2018       Radiology:  XR Lumbar Spine 1 VW   Final Result   Intraoperative  appearance of the lumbar spine. **This report has been created using voice recognition software. It may contain minor errors which are inherent in voice recognition technology. **      Final report electronically signed by Dr. Nick Eddy on 1/16/2018 1:55 PM      FLUORO FOR SURGICAL PROCEDURES   Final Result      MRI LUMBAR SPINE [address]    (Results Pending)       Diet: DIET GENERAL;    DVT prophylaxis: [] Lovenox                                 [] SCDs                                 [] SQ Heparin                                 [] Encourage

## 2018-01-17 NOTE — PROGRESS NOTES
Chris Major 60  INPATIENT OCCUPATIONAL THERAPY  [facility] NEUROSCIENCES 4A  EVALUATION    Time:  Time In: 830  Time Out: 3895  Timed Code Treatment Minutes: 12 Minutes  Minutes: 27          Date: 2018  Patient Name: Leon Lindo,   Gender: male      MRN: [id number]  : 1965  (46 y.o.)  Referring Practitioner: Catrina Suarez CNP  Diagnosis: lumbar spine tumor  Additional Pertinent Hx: Pt reporting having increased back pain initiated approx 1 month ago where he spent time in hospital. 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 on 18    Restrictions/Precautions:  Restrictions/Precautions: Fall Risk            Position Activity Restriction  Spinal Precautions: sx 18         Past Medical History:   Diagnosis Date    Acute systolic CHF (congestive heart failure) ([address] 2015    Alcohol abuse     stopped drinking since     Anomalous origin of right coronary artery 2014    Atrial fibrillation ([address]     CAD (coronary artery disease) 3/25/2014    s/p CABG in may 2014    Cardiomyopathy ([address]     Depression     Diabetes mellitus ([address]     Drug abuse     hx of cacaine abuse, stopped abusing drugs in     H/O cardiac catheterization 2014    Hyperlipidemia     Hypertension     Paroxysmal atrial fibrillation ([address] 2014    [facility]     V-tach [facility])     s/p ablation in dec 2014     Past Surgical History:   Procedure Laterality Date    CARDIAC CATHETERIZATION  3/20/14     [facility]    CARDIAC DEFIBRILLATOR PLACEMENT  10/13/2015    [facility], MRI CONDITIONAL ICD    CORONARY ARTERY BYPASS GRAFT  14    3 bypass    EKG 12-LEAD  2015         OTHER SURGICAL HISTORY Left 10/21/14    Left Bursectomy, I & D Left Elbow - Dr. Moore Son harvest from legs           Subjective  Chart Reviewed: Yes (completed medical chart review including physician and op reports)  Patient assessed for rehabilitation services?: Yes    Subjective: Pt lying in bed requesting pain medication. Viri RIVER made aware and approving session. Pt agreeable to getting up     General:       Vision: Within Functional Limits    Hearing: Within functional limits         Pain:  Pain Assessment  Patient Currently in Pain: Yes  Pain Level: 8  Pain Type: Surgical pain  Pain Location: Back  Pain Descriptors: Aching  Pain Frequency: Continuous       Social/Functional:  Lives With: Alone  Type of Home: House  Home Layout: One level  Home Access:  (3)     Bathroom Shower/Tub: Tub/Shower unit  Bathroom Toilet: Standard  Bathroom Accessibility: Accessible       ADL Assistance: Independent  Homemaking Assistance: Independent  Homemaking Responsibilities: Yes    Ambulation Assistance: Independent  Transfer Assistance: Independent       Additional Comments: Pt reporting his ADL tasks especially LB were getting harder for him to complete since last hospital admission. Pt stating he did not use a AD at home for mobility    Objective        Overall Cognitive Status: WNL    Perception  Overall Perceptual Status: WFL    Sensation  Overall Sensation Status: WNL              Hand Dominance: Right            LUE AROM (degrees)  LUE AROM : WNL          RUE AROM (degrees)  RUE AROM : WNL       LUE Strength  Gross LUE Strength: WFL                RUE Strength  Gross RUE Strength: WFL                     RUE Tone: Normotonic  LUE Tone: Normotonic                    ADL  Grooming: Contact guard assistance (standing )  LE Dressing: Maximum assistance  Additional Comments: Initiated education on back precautions and how they effect his ability to complete LB ADL tasks.  Pt reporting he was struggling to complete them prior to sx     Bed mobility  Supine to Sit: Minimal assistance (education on log rolling tech wiht pt following with tactile cues )    Transfers  Sit to stand: Contact guard assistance (from log rolling tech durin gbed mobility     Equipment Recommendations: Other: possible RTS/BSC    Safety:  Safety Devices in place: Yes  Type of devices: All fall risk precautions in place, Gait belt, Call light within reach, Chair alarm in place, Nurse notified    Plan:  Times per week: 6x  Current Treatment Recommendations: Balance Training, Endurance Training, Patient/Caregiver Education & Training, Self-Care / ADL, Safety Education & Training    Goals:  Patient goals : be samson to take care of himself to return home     Short term goals  Time Frame for Short term goals: 2 weeks   Short term goal 1: Pt to complete LB ADL tasks while maintaining back precautions requiring no vcs with min a to don pants and shoes  Short term goal 2: Pt to dmeo dynamic standing > 5 min reaching outside base of uspport wth bilateral UE maintaining bakc precautions with SBA nikki complete simpel meal prep  Long term goals  Time Frame for Long term goals : not est d/t ELOS     Evaluation Complexity: Based on the findings of patient history, examination, clinical presentation, and decision making during this evaluation, this patient is of low complexity.     AM-PAC Inpatient Daily Activity Raw Score: 17  AM-PAC Inpatient ADL T-Scale Score : 37.26  ADL Inpatient CMS 0-100% Score: 50.11  ADL Inpatient CMS G-Code Modifier : CK

## 2018-01-18 ENCOUNTER — TELEPHONE (OUTPATIENT)
Dept: FAMILY MEDICINE CLINIC | Age: 53
End: 2018-01-18

## 2018-01-18 ENCOUNTER — APPOINTMENT (OUTPATIENT)
Dept: MRI IMAGING | Age: 53
DRG: 519 | End: 2018-01-18
Attending: NEUROLOGICAL SURGERY
Payer: COMMERCIAL

## 2018-01-18 VITALS
HEIGHT: 74 IN | BODY MASS INDEX: 34.39 KG/M2 | RESPIRATION RATE: 16 BRPM | OXYGEN SATURATION: 95 % | SYSTOLIC BLOOD PRESSURE: 154 MMHG | TEMPERATURE: 97.6 F | DIASTOLIC BLOOD PRESSURE: 81 MMHG | HEART RATE: 59 BPM | WEIGHT: 268 LBS

## 2018-01-18 LAB
GLUCOSE BLD-MCNC: 168 MG/DL (ref 70–108)
GLUCOSE BLD-MCNC: 175 MG/DL (ref 70–108)
GLUCOSE BLD-MCNC: 226 MG/DL (ref 70–108)

## 2018-01-18 PROCEDURE — 99232 SBSQ HOSP IP/OBS MODERATE 35: CPT | Performed by: INTERNAL MEDICINE

## 2018-01-18 PROCEDURE — 82948 REAGENT STRIP/BLOOD GLUCOSE: CPT

## 2018-01-18 PROCEDURE — 6370000000 HC RX 637 (ALT 250 FOR IP): Performed by: NURSE PRACTITIONER

## 2018-01-18 PROCEDURE — 97110 THERAPEUTIC EXERCISES: CPT

## 2018-01-18 PROCEDURE — 72158 MRI LUMBAR SPINE W/O & W/DYE: CPT

## 2018-01-18 PROCEDURE — 2580000003 HC RX 258: Performed by: NURSE PRACTITIONER

## 2018-01-18 PROCEDURE — 6370000000 HC RX 637 (ALT 250 FOR IP): Performed by: HOSPITALIST

## 2018-01-18 PROCEDURE — A9579 GAD-BASE MR CONTRAST NOS,1ML: HCPCS | Performed by: NEUROLOGICAL SURGERY

## 2018-01-18 PROCEDURE — 6360000004 HC RX CONTRAST MEDICATION: Performed by: NEUROLOGICAL SURGERY

## 2018-01-18 PROCEDURE — A6252 ABSORPT DRG >16 <=48 W/O BDR: HCPCS

## 2018-01-18 PROCEDURE — 99024 POSTOP FOLLOW-UP VISIT: CPT | Performed by: NURSE PRACTITIONER

## 2018-01-18 PROCEDURE — 97116 GAIT TRAINING THERAPY: CPT

## 2018-01-18 RX ORDER — OXYCODONE HYDROCHLORIDE 5 MG/1
5 TABLET ORAL EVERY 4 HOURS PRN
Qty: 30 TABLET | Refills: 0 | Status: SHIPPED | OUTPATIENT
Start: 2018-01-18 | End: 2018-01-25 | Stop reason: SDUPTHER

## 2018-01-18 RX ORDER — NICOTINE 21 MG/24HR
1 PATCH, TRANSDERMAL 24 HOURS TRANSDERMAL DAILY
Status: DISCONTINUED | OUTPATIENT
Start: 2018-01-18 | End: 2018-01-18 | Stop reason: HOSPADM

## 2018-01-18 RX ORDER — DIAZEPAM 5 MG/1
5 TABLET ORAL 2 TIMES DAILY
Qty: 20 TABLET | Refills: 0 | Status: SHIPPED | OUTPATIENT
Start: 2018-01-18 | End: 2018-01-28

## 2018-01-18 RX ADMIN — POTASSIUM CHLORIDE 20 MEQ: 1500 TABLET, EXTENDED RELEASE ORAL at 09:20

## 2018-01-18 RX ADMIN — OXYCODONE HYDROCHLORIDE 5 MG: 5 TABLET ORAL at 03:28

## 2018-01-18 RX ADMIN — METOPROLOL TARTRATE 50 MG: 50 TABLET, FILM COATED ORAL at 09:20

## 2018-01-18 RX ADMIN — BACLOFEN 20 MG: 10 TABLET ORAL at 09:20

## 2018-01-18 RX ADMIN — TAMSULOSIN HYDROCHLORIDE 0.4 MG: 0.4 CAPSULE ORAL at 09:20

## 2018-01-18 RX ADMIN — FUROSEMIDE 40 MG: 40 TABLET ORAL at 09:20

## 2018-01-18 RX ADMIN — HYDRALAZINE HYDROCHLORIDE 25 MG: 25 TABLET, FILM COATED ORAL at 07:01

## 2018-01-18 RX ADMIN — MEXILETINE HYDROCHLORIDE 300 MG: 150 CAPSULE ORAL at 15:03

## 2018-01-18 RX ADMIN — ACETAMINOPHEN 1000 MG: 500 TABLET ORAL at 09:20

## 2018-01-18 RX ADMIN — Medication 10 ML: at 09:21

## 2018-01-18 RX ADMIN — GADOTERIDOL 20 ML: 279.3 INJECTION, SOLUTION INTRAVENOUS at 08:40

## 2018-01-18 RX ADMIN — OXYCODONE HYDROCHLORIDE 5 MG: 5 TABLET ORAL at 07:02

## 2018-01-18 RX ADMIN — ACETAMINOPHEN 1000 MG: 500 TABLET ORAL at 03:29

## 2018-01-18 RX ADMIN — MEXILETINE HYDROCHLORIDE 300 MG: 150 CAPSULE ORAL at 07:02

## 2018-01-18 RX ADMIN — OXYCODONE HYDROCHLORIDE 5 MG: 5 TABLET ORAL at 15:03

## 2018-01-18 RX ADMIN — FINASTERIDE 5 MG: 5 TABLET, FILM COATED ORAL at 09:20

## 2018-01-18 RX ADMIN — OXYCODONE HYDROCHLORIDE 5 MG: 5 TABLET ORAL at 11:03

## 2018-01-18 RX ADMIN — AMIODARONE HYDROCHLORIDE 200 MG: 200 TABLET ORAL at 09:20

## 2018-01-18 RX ADMIN — SACUBITRIL AND VALSARTAN 1 TABLET: 49; 51 TABLET, FILM COATED ORAL at 09:20

## 2018-01-18 RX ADMIN — ACETAMINOPHEN 1000 MG: 500 TABLET ORAL at 15:03

## 2018-01-18 RX ADMIN — HYDRALAZINE HYDROCHLORIDE 25 MG: 25 TABLET, FILM COATED ORAL at 15:03

## 2018-01-18 RX ADMIN — BACLOFEN 20 MG: 10 TABLET ORAL at 15:03

## 2018-01-18 RX ADMIN — DIAZEPAM 5 MG: 5 TABLET ORAL at 07:24

## 2018-01-18 ASSESSMENT — PAIN SCALES - GENERAL
PAINLEVEL_OUTOF10: 10
PAINLEVEL_OUTOF10: 7
PAINLEVEL_OUTOF10: 7
PAINLEVEL_OUTOF10: 8
PAINLEVEL_OUTOF10: 8
PAINLEVEL_OUTOF10: 10
PAINLEVEL_OUTOF10: 8
PAINLEVEL_OUTOF10: 8
PAINLEVEL_OUTOF10: 7

## 2018-01-18 ASSESSMENT — PAIN DESCRIPTION - LOCATION
LOCATION: BACK
LOCATION: BACK

## 2018-01-18 ASSESSMENT — PAIN DESCRIPTION - ORIENTATION
ORIENTATION: MID
ORIENTATION: MID

## 2018-01-18 ASSESSMENT — PAIN DESCRIPTION - DESCRIPTORS
DESCRIPTORS: ACHING;DISCOMFORT
DESCRIPTORS: ACHING;DISCOMFORT

## 2018-01-18 ASSESSMENT — PAIN DESCRIPTION - PROGRESSION
CLINICAL_PROGRESSION: NOT CHANGED
CLINICAL_PROGRESSION: NOT CHANGED

## 2018-01-18 ASSESSMENT — PAIN DESCRIPTION - FREQUENCY
FREQUENCY: CONTINUOUS
FREQUENCY: CONTINUOUS

## 2018-01-18 ASSESSMENT — PAIN DESCRIPTION - PAIN TYPE
TYPE: SURGICAL PAIN
TYPE: SURGICAL PAIN

## 2018-01-18 ASSESSMENT — PAIN DESCRIPTION - ONSET
ONSET: ON-GOING
ONSET: ON-GOING

## 2018-01-18 NOTE — PROGRESS NOTES
cooperative. HEENT: NC/AT. Conjunctivae/corneas clear. Neck: Supple, with full range of motion. Respiratory:  Normal respiratory effort. Clear to auscultation,   Cardiovascular: Regular rate and rhythm with normal S1/S2 . Abdomen: Soft, non-tender, non-distended with normal bowel sounds. Musculoskeletal: Full range of motion without deformity. Skin: Skin color, texture, turgor normal.  No rashes or lesions. Neurologic:  Grossly non-focal.  Psychiatric: Alert and oriented, thought content appropriate, normal insight  Capillary Refill: Brisk,< 3 seconds   Peripheral Pulses: +2 palpable, equal bilaterally       Labs:   Recent Labs      01/16/18   1103  01/17/18   0814   WBC   --   10.5   HGB  12.0*  11.5*   HCT   --   34.3*   PLT   --   179     Recent Labs      01/16/18   1105  01/17/18   0814   NA  142  136   K  4.2  4.8   CL   --   103   CO2   --   24   BUN   --   19   CREATININE   --   1.6*   CALCIUM   --   8.4*     No results for input(s): AST, ALT, BILIDIR, BILITOT, ALKPHOS in the last 72 hours. No results for input(s): INR in the last 72 hours. No results for input(s): [place] in the last 72 hours. Urinalysis:      Lab Results   Component Value Date    NITRU NEGATIVE 01/07/2018    WBCUA 10-15 01/07/2018    BACTERIA NONE 01/07/2018    RBCUA 3-5 01/07/2018    BLOODU NEGATIVE 01/07/2018    SPECGRAV >1.030 12/20/2017    GLUCOSEU NEGATIVE 01/07/2018       Radiology:  MRI LUMBAR SPINE W WO CONTRAST   Final Result       Postsurgical changes are noted from a laminectomy at L1 and surgical resection of the previously seen intradural lesion. There is a small amount of enhancement within the posterior aspect of the thecal sac at this level which may relate to postsurgical    change and granulation tissue. Continued follow-up would be beneficial. Edematous soft tissue is also noted within the laminectomy bed and there is mild to moderate narrowing of the spinal canal at the L1 level.

## 2018-01-18 NOTE — DISCHARGE SUMMARY
incision area dry, no shower until sutures are removed  - Watch for signs of infection, redness, swelling, drainage, or fever  - No bending, twisting, or lifting  - Avoid sitting for long periods of time (car rides, ect.)  - Activity as tolerated, walking encouraged  - Call office with any change in status, or questions  - Tylenol 1,000 mg orally every 6 hours around the clock for pain  - Valium 5 mg 2 times daily as needed for muscle relaxing  - Oxycodone 5 mg PO as needed for pain      Follow-up visits:   Meka Yanez, NP  [facility], [address] [place] [address] Mitesh Ram[address]rez [address]  [phone]    On 1/25/2018  945 for follow-up for hospital discharge. [address]  [phone]    On 1/26/2018  at 9:00 , For suture removal, for follow-up for hospital discharge.     [place] [address]  [phone]        Susi Terrell, [address] [place] [address]  [phone]    On 1/31/2018  10:00am [facility] [phone]       Electronically signed by Aquilino Johnson CNP on 1/19/2018 at 7:53 AM

## 2018-01-18 NOTE — PROGRESS NOTES
Pt c/o anxiety, couldn't kept drifting off but able to fall asleep. Pt also c/o \"excruciating pain across lower back  Under the surgical site , dressing dry and intact with old shadowing at bottom of dressing, no swelling or redness note no s/sx of hematoma. Pt c/o Nely Iqbal  took me of the dilaudid and norco for no reason\". Explained the dilaudid was dc'd in anticipation of his poss discharge today and since he was on scheduled tylenol taking norco would increase his acetaminophen levels. Will continue to monitor and medicate when due.

## 2018-01-18 NOTE — PROGRESS NOTES
educated to keep doing gentle ex at home and to get up and walk frequently in the home      Activity Tolerance:  Activity Tolerance: Patient Tolerated treatment well;Patient limited by pain    Assessment: Body structures, Functions, Activity limitations: Decreased functional mobility , Decreased endurance, Decreased strength, Decreased balance  Assessment: pt tolerated session fair, pt cont to move slow and gaurded with all activity he needed much assist with bed mobility discussed  having sister stay for a couple of days as he is stil needing assist, he would benefit from cont therapy at discharge   Prognosis: Excellent  REQUIRES PT FOLLOW UP: Yes  Discharge Recommendations: Continue to assess pending progress, Patient would benefit from continued therapy after discharge    Patient Education:  Patient Education: POC  Barriers to Learning: None    Equipment Recommendations:  Equipment Needed: Yes (RW ordered from [facility] on 1/17/2018. )    Safety:  Type of devices:  All fall risk precautions in place, Call light within reach, Gait belt, Nurse notified, Left in chair, Chair alarm in place    Plan:  Times per week: 6xN/O  Times per day: Daily  Specific instructions for Next Treatment: ther ex, mobility, ambulation both with and without the RW, steps (platform step), car transfer if able    Goals:  Patient goals : Pt goal to return home    Short term goals  Time Frame for Short term goals: 1 week   Short term goal 1: supine to/from sit at [facility] observing log rolling technique, in order to get in/out of bed  Short term goal 2: sit to/from stand at [facility] I in order to get up to walk  Short term goal 3: ambulate 150 feet with RW at [facility] I in order to walk in home safely  Short term goal 4: ascend/descend 1 platform steps, 2 reps at [facility] I with RW in order to enter/exit home  Short term goal 5: car transfer at [facility] I in order to travel to/from doctor's appointments safely    Long term goals  Time Frame for Long term goals : No LTG due to short ELOS.

## 2018-01-18 NOTE — PROGRESS NOTES
Chris Major 60  OCCUPATIONAL THERAPY MISSED TREATMENT NOTE  [facility] NEUROSCIENCES 4A  4A-15/015-A      Date: 2018  Patient Name: Fernando Aguayo        CSN: [id number]   : 1965  (46 y.o.)  Gender: male   Referring Practitioner: Joseph Soares CNP  Diagnosis: lumbar spine tumor       REASON FOR MISSED TREATMENT:  Att x 1 pt at MRI. Att x 2 nurse ok'd activity. Pt resting in bed and refusing treatment despite encouragement given. Pt states \"I've already done therapy once\" and \"I just want to sleep\". Will check back as time allows.

## 2018-01-18 NOTE — PROGRESS NOTES
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 Diet:  DIET GENERAL;    Medications:  Scheduled Meds:   nicotine  1 patch Transdermal Daily    insulin lispro  0-6 Units Subcutaneous 4x Daily AC & HS    sodium chloride flush  10 mL Intravenous 2 times per day    acetaminophen  1,000 mg Oral Q6H    diazepam  5 mg Oral BID    amiodarone  200 mg Oral BID    baclofen  20 mg Oral TID    finasteride  5 mg Oral Daily    furosemide  40 mg Oral Daily    hydrALAZINE  25 mg Oral 3 times per day    metoprolol tartrate  50 mg Oral BID    potassium chloride  20 mEq Oral BID    mexiletine  300 mg Oral 3 times per day    sacubitril-valsartan  1 tablet Oral BID    simvastatin  20 mg Oral Nightly    tamsulosin  0.4 mg Oral Daily     Continuous Infusions:   sodium chloride Stopped (01/16/18 1230)    dextrose 5% and 0.45% NaCl with KCl 20 mEq Stopped (01/17/18 0842)    dextrose 5 % and 0.9 % NaCl       PRN Meds:oxyCODONE, sodium chloride flush, ondansetron, glucose, dextrose, glucagon (rDNA), dextrose 5 % and 0.9 % NaCl    Objective:    CBC:   Recent Labs      01/16/18   1103  01/17/18   0814   WBC   --   10.5   HGB  12.0*  11.5*   PLT   --   179     BMP:  Recent Labs      01/16/18   1105  01/17/18   0814   NA  142  136   K  4.2  4.8   CL   --   103   CO2   --   24   BUN   --   19   CREATININE   --   1.6*   GLUCOSE   --   125*     Magnesium:No results for input(s): MG in the last 72 hours.   Glucose:  Recent Labs      01/18/18   0547  01/18/18   1141  01/18/18   1259   POCGLU  175*  226*  168*

## 2018-01-18 NOTE — CARE COORDINATION
1/18/18, 2:11 PM    Discharge plan discussed by  and . Discharge plan reviewed with patient/ family. Patient/ family verbalize understanding of discharge plan and are in agreement with plan. Understanding was demonstrated using the teach back method. SW made referral to [facility] for PT and OT services. Media Tamara will be discharged home today with help from his sister.

## 2018-01-19 ENCOUNTER — NURSE TRIAGE (OUTPATIENT)
Dept: ADMINISTRATIVE | Age: 53
End: 2018-01-19

## 2018-01-19 ENCOUNTER — TELEPHONE (OUTPATIENT)
Dept: FAMILY MEDICINE CLINIC | Age: 53
End: 2018-01-19

## 2018-01-19 NOTE — TELEPHONE ENCOUNTER
[facility] Transitions Initial Follow Up Call    Call within 2 business days of discharge: Yes    Patient: Nadia Barbosa Patient : 1965   MRN: [id number]  Reason for Admission: There are no discharge diagnoses documented for the most recent discharge. Discharge Date: 18 RARS: Arina JONES([id number])@     Spoke with: Facility: [unfilled]    Non-face-to-face services provided:    . Have the medications prescribed at discharge been filled? Yes  Does the patient understand what the medications are for? yes  Has the patient experienced any new symptoms or have previous symptoms worsened? no  Is the patient experiencing any pain? Janes f yes, is the pain well controlled? yes  We want to be sure the patient has the best recovery possible. Does the patient understand all the discharge instructions? yes  Did someone talk to the patient and/or family about the patient needs prior to being discharged?  yes  Did the patient's doctor communicate well? yes  Did the patient's nurse communicate well? yes  Was the patient satisfied with the services and care received at [address] [place] [address]? yes        Follow Up  Future Appointments  Date Time Provider [place]   2018 9:45 AM Edda Porras NP [address]   2018 9:00 AM David Galo CNP [facility]   2018 10:00 AM Leif Angulo CNP [facility] - [facility] [address]   2018 3:30 PM Edda Porras NP [address]   3/7/2018 3:15 PM Char Ya MD [address] [facility] - Chris Cota RN

## 2018-01-20 ENCOUNTER — NURSE TRIAGE (OUTPATIENT)
Dept: ADMINISTRATIVE | Age: 53
End: 2018-01-20

## 2018-01-21 ENCOUNTER — APPOINTMENT (OUTPATIENT)
Dept: CT IMAGING | Age: 53
End: 2018-01-21
Payer: COMMERCIAL

## 2018-01-21 ENCOUNTER — HOSPITAL ENCOUNTER (EMERGENCY)
Age: 53
Discharge: HOME | End: 2018-01-21
Payer: COMMERCIAL

## 2018-01-21 VITALS
WEIGHT: 265 LBS | RESPIRATION RATE: 22 BRPM | TEMPERATURE: 98.4 F | HEART RATE: 70 BPM | BODY MASS INDEX: 34.01 KG/M2 | SYSTOLIC BLOOD PRESSURE: 158 MMHG | DIASTOLIC BLOOD PRESSURE: 114 MMHG | HEIGHT: 74 IN | OXYGEN SATURATION: 98 %

## 2018-01-21 DIAGNOSIS — K59.09 OTHER CONSTIPATION: Primary | ICD-10-CM

## 2018-01-21 LAB
ALBUMIN SERPL-MCNC: 3.3 G/DL (ref 3.5–5.1)
ALP BLD-CCNC: 65 U/L (ref 38–126)
ALT SERPL-CCNC: 19 U/L (ref 11–66)
ANION GAP SERPL CALCULATED.3IONS-SCNC: 15 MEQ/L (ref 8–16)
ANISOCYTOSIS: ABNORMAL
AST SERPL-CCNC: 21 U/L (ref 5–40)
BASOPHILS # BLD: 0.5 %
BASOPHILS ABSOLUTE: 0.1 THOU/MM3 (ref 0–0.1)
BILIRUB SERPL-MCNC: 0.4 MG/DL (ref 0.3–1.2)
BUN BLDV-MCNC: 26 MG/DL (ref 7–22)
CALCIUM SERPL-MCNC: 9.2 MG/DL (ref 8.5–10.5)
CHLORIDE BLD-SCNC: 100 MEQ/L (ref 98–111)
CO2: 20 MEQ/L (ref 23–33)
CREAT SERPL-MCNC: 1.7 MG/DL (ref 0.4–1.2)
EOSINOPHIL # BLD: 1.3 %
EOSINOPHILS ABSOLUTE: 0.2 THOU/MM3 (ref 0–0.4)
GFR SERPL CREATININE-BSD FRML MDRD: 42 ML/MIN/1.73M2
GLUCOSE BLD-MCNC: 47 MG/DL (ref 70–108)
HCT VFR BLD CALC: 33.5 % (ref 42–52)
HEMOGLOBIN: 11.4 GM/DL (ref 14–18)
LACTIC ACID: 0.5 MMOL/L (ref 0.5–2.2)
LIPASE: 27.1 U/L (ref 5.6–51.3)
LYMPHOCYTES # BLD: 7.1 %
LYMPHOCYTES ABSOLUTE: 0.8 THOU/MM3 (ref 1–4.8)
MCH RBC QN AUTO: 33.7 PG (ref 27–31)
MCHC RBC AUTO-ENTMCNC: 34 GM/DL (ref 33–37)
MCV RBC AUTO: 99 FL (ref 80–94)
MONOCYTES # BLD: 8.7 %
MONOCYTES ABSOLUTE: 1 THOU/MM3 (ref 0.4–1.3)
NUCLEATED RED BLOOD CELLS: 0 /100 WBC
OSMOLALITY CALCULATION: 272 MOSMOL/KG (ref 275–300)
PDW BLD-RTO: 15.5 % (ref 11.5–14.5)
PLATELET # BLD: 206 THOU/MM3 (ref 130–400)
PMV BLD AUTO: 8.5 MCM (ref 7.4–10.4)
POTASSIUM SERPL-SCNC: 4.4 MEQ/L (ref 3.5–5.2)
RBC # BLD: 3.39 MILL/MM3 (ref 4.7–6.1)
SEG NEUTROPHILS: 82.4 %
SEGMENTED NEUTROPHILS ABSOLUTE COUNT: 9.8 THOU/MM3 (ref 1.8–7.7)
SODIUM BLD-SCNC: 135 MEQ/L (ref 135–145)
TOTAL PROTEIN: 6.1 G/DL (ref 6.1–8)
WBC # BLD: 11.9 THOU/MM3 (ref 4.8–10.8)

## 2018-01-21 PROCEDURE — 6360000004 HC RX CONTRAST MEDICATION: Performed by: PHYSICIAN ASSISTANT

## 2018-01-21 PROCEDURE — 6360000002 HC RX W HCPCS: Performed by: PHYSICIAN ASSISTANT

## 2018-01-21 PROCEDURE — 85025 COMPLETE CBC W/AUTO DIFF WBC: CPT

## 2018-01-21 PROCEDURE — 6370000000 HC RX 637 (ALT 250 FOR IP): Performed by: PHYSICIAN ASSISTANT

## 2018-01-21 PROCEDURE — 74177 CT ABD & PELVIS W/CONTRAST: CPT

## 2018-01-21 PROCEDURE — 80053 COMPREHEN METABOLIC PANEL: CPT

## 2018-01-21 PROCEDURE — 2580000003 HC RX 258: Performed by: PHYSICIAN ASSISTANT

## 2018-01-21 PROCEDURE — 96375 TX/PRO/DX INJ NEW DRUG ADDON: CPT

## 2018-01-21 PROCEDURE — 36415 COLL VENOUS BLD VENIPUNCTURE: CPT

## 2018-01-21 PROCEDURE — 83605 ASSAY OF LACTIC ACID: CPT

## 2018-01-21 PROCEDURE — 96374 THER/PROPH/DIAG INJ IV PUSH: CPT

## 2018-01-21 PROCEDURE — 83690 ASSAY OF LIPASE: CPT

## 2018-01-21 PROCEDURE — 99284 EMERGENCY DEPT VISIT MOD MDM: CPT

## 2018-01-21 RX ORDER — ONDANSETRON 2 MG/ML
4 INJECTION INTRAMUSCULAR; INTRAVENOUS ONCE
Status: COMPLETED | OUTPATIENT
Start: 2018-01-21 | End: 2018-01-21

## 2018-01-21 RX ORDER — LORAZEPAM 1 MG/1
1 TABLET ORAL ONCE
Status: COMPLETED | OUTPATIENT
Start: 2018-01-21 | End: 2018-01-21

## 2018-01-21 RX ORDER — LORAZEPAM 2 MG/ML
1 INJECTION INTRAMUSCULAR ONCE
Status: COMPLETED | OUTPATIENT
Start: 2018-01-21 | End: 2018-01-21

## 2018-01-21 RX ORDER — 0.9 % SODIUM CHLORIDE 0.9 %
500 INTRAVENOUS SOLUTION INTRAVENOUS ONCE
Status: COMPLETED | OUTPATIENT
Start: 2018-01-21 | End: 2018-01-21

## 2018-01-21 RX ADMIN — LORAZEPAM 1 MG: 2 INJECTION INTRAMUSCULAR; INTRAVENOUS at 04:20

## 2018-01-21 RX ADMIN — MAGESIUM CITRATE 296 ML: 1.75 LIQUID ORAL at 05:36

## 2018-01-21 RX ADMIN — LORAZEPAM 1 MG: 1 TABLET ORAL at 05:36

## 2018-01-21 RX ADMIN — IOPAMIDOL 80 ML: 755 INJECTION, SOLUTION INTRAVENOUS at 04:30

## 2018-01-21 RX ADMIN — SODIUM CHLORIDE 500 ML: 9 INJECTION, SOLUTION INTRAVENOUS at 02:35

## 2018-01-21 RX ADMIN — ONDANSETRON 4 MG: 2 INJECTION INTRAMUSCULAR; INTRAVENOUS at 02:37

## 2018-01-21 ASSESSMENT — PAIN DESCRIPTION - ONSET: ONSET: PROGRESSIVE

## 2018-01-21 ASSESSMENT — PAIN DESCRIPTION - PAIN TYPE: TYPE: ACUTE PAIN

## 2018-01-21 ASSESSMENT — ENCOUNTER SYMPTOMS
EYE REDNESS: 0
VOMITING: 1
WHEEZING: 0
CONSTIPATION: 1
EYE DISCHARGE: 0
COUGH: 0
RHINORRHEA: 0
BACK PAIN: 0
NAUSEA: 0
SORE THROAT: 0
SHORTNESS OF BREATH: 0
ABDOMINAL PAIN: 0
DIARRHEA: 0

## 2018-01-21 ASSESSMENT — PAIN DESCRIPTION - FREQUENCY: FREQUENCY: CONTINUOUS

## 2018-01-21 ASSESSMENT — PAIN DESCRIPTION - ORIENTATION: ORIENTATION: LOWER

## 2018-01-21 ASSESSMENT — PAIN DESCRIPTION - PROGRESSION: CLINICAL_PROGRESSION: GRADUALLY WORSENING

## 2018-01-21 ASSESSMENT — PAIN SCALES - GENERAL: PAINLEVEL_OUTOF10: 8

## 2018-01-21 ASSESSMENT — PAIN DESCRIPTION - LOCATION: LOCATION: ABDOMEN;BACK

## 2018-01-21 ASSESSMENT — PAIN DESCRIPTION - DESCRIPTORS: DESCRIPTORS: PRESSURE

## 2018-01-21 NOTE — ED PROVIDER NOTES
Allergic/Immunologic: Negative for environmental allergies. Neurological: Negative for dizziness, syncope, weakness, light-headedness and headaches. Hematological: Negative for adenopathy. Psychiatric/Behavioral: Negative for agitation, confusion, dysphoric mood and suicidal ideas. The patient is not nervous/anxious. PAST MEDICAL HISTORY    has a past medical history of Acute systolic CHF (congestive heart failure) ([facility] Alcohol abuse; Anomalous origin of right coronary artery; Atrial fibrillation ([facility] CAD (coronary artery disease); Cardiomyopathy ([facility] Depression; Diabetes mellitus ([facility] Drug abuse; H/O cardiac catheterization; Hyperlipidemia; Hypertension; [facility]oxysmal [facility] V tach ([facility] and V-tach ([facility] SURGICAL HISTORY      has a past surgical history that includes Cardiac catheterization (3/20/14 ); Coronary artery bypass graft (5-9-14); other surgical history (Left, 10/21/14); EKG 12 Lead (7/17/2015); Cardiac defibrillator placement (10/13/2015); vascular surgery; pacemaker placement; and laminectomy,>2 sgmt,lumbar (N/A, 1/16/2018). CURRENT MEDICATIONS       Discharge Medication List as of 1/21/2018  5:27 AM      CONTINUE these medications which have NOT CHANGED    Details   diazepam (VALIUM) 5 MG tablet Take 1 tablet by mouth 2 times daily for 10 days. , Disp-20 tablet, R-0Print      oxyCODONE (ROXICODONE) 5 MG immediate release tablet Take 1 tablet by mouth every 4 hours as needed for Pain for up to 7 days. , Disp-30 tablet, R-0Print      finasteride (PROSCAR) 5 MG tablet Take 1 tab Daily, Disp-90 tablet, R-3Normal      potassium chloride (KLOR-CON M) 20 MEQ extended release tablet TAKE 1 TABLET TWICE A DAY, Disp-180 tablet, R-1Normal      baclofen (LIORESAL) 20 MG tablet Take 1 tablet by mouth 3 times daily, Disp-30 tablet, R-0Normal      linagliptin-metformin 2.5-1000 MG TABS Take 1 tablet by mouth 2 times daily, Disp-180 tablet, R-1Normal he has been smoking Cigarettes. He started smoking about 37 years ago. He has a 32.00 pack-year smoking history. He has quit using smokeless tobacco. He reports that he does not drink alcohol or use drugs. PHYSICAL EXAM     INITIAL VITALS:  height is 6' 2\" (1.88 m) and weight is 265 lb (120.2 kg). His oral temperature is 98.4 °F (36.9 °C). His blood pressure is 158/114 (abnormal) and his pulse is 70. His respiration is 22 and oxygen saturation is 98%. Physical Exam   Constitutional: He is oriented to person, place, and time. He appears well-developed and well-nourished. HENT:   Head: Normocephalic and atraumatic. Right Ear: External ear normal.   Left Ear: External ear normal.   Eyes: Conjunctivae are normal. Right eye exhibits no discharge. Left eye exhibits no discharge. No scleral icterus. Neck: Normal range of motion. Neck supple. No JVD present. Pulmonary/Chest: Effort normal. No respiratory distress. He has no decreased breath sounds. He has no rhonchi. Abdominal: Soft. He exhibits distension. He exhibits no mass. Bowel sounds are increased. There is tenderness. There is rigidity. There is no rebound, no guarding and negative Juárez's sign. Musculoskeletal: Normal range of motion. He exhibits no edema. Neurological: He is alert and oriented to person, place, and time. He exhibits normal muscle tone. He displays no seizure activity. GCS eye subscore is 4. GCS verbal subscore is 5. GCS motor subscore is 6. Skin: Skin is warm and dry. No rash noted. He is not diaphoretic. Psychiatric: He has a normal mood and affect. His behavior is normal. Thought content normal.   Nursing note and vitals reviewed.       DIFFERENTIAL DIAGNOSIS:   Constipation vs abdominal pain vs bowel obstruction    DIAGNOSTIC RESULTS     EKG: All EKG's are interpreted by the Emergency Department Physician who either signs or Co-signs this chart in the absence of a cardiologist.    None    RADIOLOGY: non-plain film

## 2018-01-21 NOTE — ED TRIAGE NOTES
Pt presents to the ED with c/o difficulty urinating and constipation. Pt reports he had back surgery Tuesday. Pt reports his last BM was 1/13/18. Pt reports he takes oxycodone at home following his surgery, and also reports he has been taking Miralax. Upon arrival to the ED, pts abdomen is hard and distended. HUGH Negrete at bedside for pt assessment. Call light in reach. Will monitor.

## 2018-01-22 ENCOUNTER — TELEPHONE (OUTPATIENT)
Dept: FAMILY MEDICINE CLINIC | Age: 53
End: 2018-01-22

## 2018-01-22 ENCOUNTER — APPOINTMENT (OUTPATIENT)
Dept: GENERAL RADIOLOGY | Age: 53
DRG: 638 | End: 2018-01-22
Payer: COMMERCIAL

## 2018-01-22 ENCOUNTER — HOSPITAL ENCOUNTER (INPATIENT)
Age: 53
LOS: 1 days | Discharge: HOME | DRG: 638 | End: 2018-01-24
Attending: FAMILY MEDICINE | Admitting: INTERNAL MEDICINE
Payer: COMMERCIAL

## 2018-01-22 ENCOUNTER — APPOINTMENT (OUTPATIENT)
Dept: CT IMAGING | Age: 53
DRG: 638 | End: 2018-01-22
Payer: COMMERCIAL

## 2018-01-22 ENCOUNTER — HOSPITAL ENCOUNTER (EMERGENCY)
Age: 53
Discharge: LEFT BEFORE DISCHARGE | DRG: 638 | End: 2018-01-22
Payer: COMMERCIAL

## 2018-01-22 VITALS
TEMPERATURE: 98.9 F | WEIGHT: 270 LBS | HEIGHT: 74 IN | RESPIRATION RATE: 14 BRPM | HEART RATE: 62 BPM | SYSTOLIC BLOOD PRESSURE: 140 MMHG | DIASTOLIC BLOOD PRESSURE: 72 MMHG | BODY MASS INDEX: 34.65 KG/M2 | OXYGEN SATURATION: 96 %

## 2018-01-22 DIAGNOSIS — E16.2 HYPOGLYCEMIA: Primary | ICD-10-CM

## 2018-01-22 DIAGNOSIS — N17.9 AKI (ACUTE KIDNEY INJURY) (HCC): ICD-10-CM

## 2018-01-22 DIAGNOSIS — S39.012A STRAIN OF LUMBAR REGION, INITIAL ENCOUNTER: ICD-10-CM

## 2018-01-22 LAB
ALBUMIN SERPL-MCNC: 3 G/DL (ref 3.5–5.1)
ALP BLD-CCNC: 63 U/L (ref 38–126)
ALT SERPL-CCNC: 18 U/L (ref 11–66)
ANION GAP SERPL CALCULATED.3IONS-SCNC: 11 MEQ/L (ref 8–16)
ANISOCYTOSIS: ABNORMAL
AST SERPL-CCNC: 20 U/L (ref 5–40)
BASOPHILS # BLD: 0.5 %
BASOPHILS ABSOLUTE: 0.1 THOU/MM3 (ref 0–0.1)
BILIRUB SERPL-MCNC: 0.3 MG/DL (ref 0.3–1.2)
BUN BLDV-MCNC: 21 MG/DL (ref 7–22)
CALCIUM SERPL-MCNC: 9.1 MG/DL (ref 8.5–10.5)
CHLORIDE BLD-SCNC: 101 MEQ/L (ref 98–111)
CO2: 24 MEQ/L (ref 23–33)
CREAT SERPL-MCNC: 1.4 MG/DL (ref 0.4–1.2)
EKG ATRIAL RATE: 68 BPM
EKG P AXIS: 42 DEGREES
EKG P-R INTERVAL: 208 MS
EKG Q-T INTERVAL: 422 MS
EKG QRS DURATION: 84 MS
EKG QTC CALCULATION (BAZETT): 448 MS
EKG R AXIS: 18 DEGREES
EKG T AXIS: 116 DEGREES
EKG VENTRICULAR RATE: 68 BPM
EOSINOPHIL # BLD: 0.7 %
EOSINOPHILS ABSOLUTE: 0.1 THOU/MM3 (ref 0–0.4)
GFR SERPL CREATININE-BSD FRML MDRD: 53 ML/MIN/1.73M2
GLUCOSE BLD-MCNC: 136 MG/DL (ref 70–108)
GLUCOSE BLD-MCNC: 148 MG/DL (ref 70–108)
GLUCOSE BLD-MCNC: 52 MG/DL (ref 70–108)
GLUCOSE BLD-MCNC: 58 MG/DL (ref 70–108)
GLUCOSE BLD-MCNC: 80 MG/DL (ref 70–108)
GLUCOSE BLD-MCNC: 95 MG/DL (ref 70–108)
HCT VFR BLD CALC: 32.7 % (ref 42–52)
HEMOGLOBIN: 11.1 GM/DL (ref 14–18)
LYMPHOCYTES # BLD: 2 %
LYMPHOCYTES ABSOLUTE: 0.3 THOU/MM3 (ref 1–4.8)
MCH RBC QN AUTO: 33.4 PG (ref 27–31)
MCHC RBC AUTO-ENTMCNC: 33.8 GM/DL (ref 33–37)
MCV RBC AUTO: 98.7 FL (ref 80–94)
MONOCYTES # BLD: 7.7 %
MONOCYTES ABSOLUTE: 1 THOU/MM3 (ref 0.4–1.3)
NUCLEATED RED BLOOD CELLS: 0 /100 WBC
OSMOLALITY CALCULATION: 274.7 MOSMOL/KG (ref 275–300)
PDW BLD-RTO: 15.1 % (ref 11.5–14.5)
PLATELET # BLD: 187 THOU/MM3 (ref 130–400)
PMV BLD AUTO: 8.2 MCM (ref 7.4–10.4)
POTASSIUM SERPL-SCNC: 4.6 MEQ/L (ref 3.5–5.2)
RBC # BLD: 3.31 MILL/MM3 (ref 4.7–6.1)
SEG NEUTROPHILS: 89.1 %
SEGMENTED NEUTROPHILS ABSOLUTE COUNT: 11.4 THOU/MM3 (ref 1.8–7.7)
SODIUM BLD-SCNC: 136 MEQ/L (ref 135–145)
TOTAL PROTEIN: 5.7 G/DL (ref 6.1–8)
TROPONIN T: < 0.01 NG/ML
WBC # BLD: 12.8 THOU/MM3 (ref 4.8–10.8)

## 2018-01-22 PROCEDURE — 70450 CT HEAD/BRAIN W/O DYE: CPT

## 2018-01-22 PROCEDURE — 71046 X-RAY EXAM CHEST 2 VIEWS: CPT

## 2018-01-22 PROCEDURE — 99223 1ST HOSP IP/OBS HIGH 75: CPT | Performed by: INTERNAL MEDICINE

## 2018-01-22 PROCEDURE — 96374 THER/PROPH/DIAG INJ IV PUSH: CPT

## 2018-01-22 PROCEDURE — 80053 COMPREHEN METABOLIC PANEL: CPT

## 2018-01-22 PROCEDURE — 99284 EMERGENCY DEPT VISIT MOD MDM: CPT

## 2018-01-22 PROCEDURE — 36415 COLL VENOUS BLD VENIPUNCTURE: CPT

## 2018-01-22 PROCEDURE — 70486 CT MAXILLOFACIAL W/O DYE: CPT

## 2018-01-22 PROCEDURE — 84484 ASSAY OF TROPONIN QUANT: CPT

## 2018-01-22 PROCEDURE — 82948 REAGENT STRIP/BLOOD GLUCOSE: CPT

## 2018-01-22 PROCEDURE — 72125 CT NECK SPINE W/O DYE: CPT

## 2018-01-22 PROCEDURE — 96375 TX/PRO/DX INJ NEW DRUG ADDON: CPT

## 2018-01-22 PROCEDURE — 99285 EMERGENCY DEPT VISIT HI MDM: CPT

## 2018-01-22 PROCEDURE — 85025 COMPLETE CBC W/AUTO DIFF WBC: CPT

## 2018-01-22 PROCEDURE — 2580000003 HC RX 258: Performed by: FAMILY MEDICINE

## 2018-01-22 PROCEDURE — 71045 X-RAY EXAM CHEST 1 VIEW: CPT

## 2018-01-22 PROCEDURE — 93005 ELECTROCARDIOGRAM TRACING: CPT

## 2018-01-22 PROCEDURE — 6360000002 HC RX W HCPCS: Performed by: NURSE PRACTITIONER

## 2018-01-22 RX ORDER — NALOXONE HYDROCHLORIDE 1 MG/ML
1 INJECTION INTRAMUSCULAR; INTRAVENOUS; SUBCUTANEOUS ONCE
Status: COMPLETED | OUTPATIENT
Start: 2018-01-22 | End: 2018-01-22

## 2018-01-22 RX ORDER — DEXTROSE MONOHYDRATE 100 MG/ML
INJECTION, SOLUTION INTRAVENOUS CONTINUOUS
Status: DISCONTINUED | OUTPATIENT
Start: 2018-01-23 | End: 2018-01-23 | Stop reason: ALTCHOICE

## 2018-01-22 RX ORDER — BACLOFEN 20 MG/1
20 TABLET ORAL 3 TIMES DAILY
Qty: 60 TABLET | Refills: 2 | Status: SHIPPED | OUTPATIENT
Start: 2018-01-22 | End: 2018-02-05 | Stop reason: SDUPTHER

## 2018-01-22 RX ORDER — DEXTROSE MONOHYDRATE 25 G/50ML
25 INJECTION, SOLUTION INTRAVENOUS ONCE
Status: COMPLETED | OUTPATIENT
Start: 2018-01-23 | End: 2018-01-22

## 2018-01-22 RX ADMIN — NALOXONE HYDROCHLORIDE 1 MG: 1 INJECTION PARENTERAL at 11:45

## 2018-01-22 RX ADMIN — DEXTROSE MONOHYDRATE 25 G: 500 INJECTION PARENTERAL at 23:59

## 2018-01-22 ASSESSMENT — PAIN DESCRIPTION - PAIN TYPE
TYPE: ACUTE PAIN
TYPE: ACUTE PAIN

## 2018-01-22 ASSESSMENT — PAIN DESCRIPTION - FREQUENCY: FREQUENCY: CONTINUOUS

## 2018-01-22 ASSESSMENT — PAIN DESCRIPTION - LOCATION
LOCATION: BACK
LOCATION: BACK

## 2018-01-22 ASSESSMENT — PAIN SCALES - GENERAL
PAINLEVEL_OUTOF10: 8
PAINLEVEL_OUTOF10: 8

## 2018-01-22 ASSESSMENT — PAIN DESCRIPTION - ORIENTATION: ORIENTATION: MID;LOWER

## 2018-01-22 NOTE — TELEPHONE ENCOUNTER
Patient has been dealing with low blood sugars all wknd. He went by squad to [facility] today. His bs was 26. He left [facility] ama. The hospitalist was thinking it could be the amaryl. Patient had surgery on spine last week. Minoo is his sister she is on not on hipaa. He did give permission to talk to her. His sugars were in the 50's all wknd. His blood sugars are dropping again now. Please advise.

## 2018-01-22 NOTE — TELEPHONE ENCOUNTER
Fernando April called requesting a refill on the following medications:  Requested Prescriptions     Pending Prescriptions Disp Refills    baclofen (LIORESAL) 20 MG tablet 30 tablet 0     Sig: Take 1 tablet by mouth 3 times daily     Pharmacy verified:  [email or web address]      Date of last visit: 1/8/2018  Date of next visit (if applicable): 7/48/5047

## 2018-01-22 NOTE — ED NOTES
Narcan given and no change in mental status. Pt still very drowsy. Pt responds to verbal commands but quickly doses back off. Not able to maintain conversation. Resp regular. Timi Davila CNP notified of no change in mental status.      Johnney Dandy, RN  01/22/18 7145

## 2018-01-22 NOTE — ED PROVIDER NOTES
[facility] EMERGENCY DEPT      CHIEF COMPLAINT       Chief Complaint   Patient presents with    Hypoglycemia       Nurses Notes reviewed and I agree except as noted in the HPI. HISTORY OF PRESENT ILLNESS    Eleanor Lopez is a 46 y.o. male who presents to the Emergency Department for the evaluation of hypoglycemia. Patient reports a history of diabetes, and states that it is managed by his primary care provider, Tiffany Wallace NP. He states that he takes linagliptin-metformin tablets, and denies that he took 2 doses this morning. He also reports that he ate cereal and a banana this morning for breakfast, and that he has never experienced symptoms like this in the past.    EMS reports that they arrived to find the patient \"thrashing around,\" and that his blood sugar was in the 20s upon their arrival. They state that the patient was given 1 mg of Glucagon IM, and that they then gave the patient one ampule of D-50 which brought the patient's blood sugar up into the 180s. Patient has a history of cardiomyopathy, cardiac catheterization on 4/30/2014, hypertension, alcohol abuse, drug abuse, CAD, atrial fibrillation, CHF, coronary artery bypass graft x3 on 5/9/2014, cardiac defibrillator placement on 10/13/2015, and pacemaker placement. HPI was provided by EMS and patient. REVIEW OF SYSTEMS     Review of Systems   Unable to perform ROS: Other (Patient drowsiness)       PAST MEDICAL HISTORY    has a past medical history of Acute systolic CHF (congestive heart failure) ([address] Alcohol abuse; Anomalous origin of right coronary artery; Atrial fibrillation ([address] CAD (coronary artery disease); Cardiomyopathy ([address] Depression; Diabetes mellitus ([address] Drug abuse; H/O cardiac catheterization; Hyperlipidemia; Hypertension; Paroxysmal atrial fibrillation ([address] V tach ([address] and V-tach ([address] SURGICAL HISTORY      has a past surgical history that includes Cardiac catheterization (3/20/14 );  Coronary artery bypass contain minor errors which are inherent in voice recognition technology. **      Final report electronically signed by Dr. Rufino Maldonado on 1/22/2018 11:10 AM      CT CERVICAL SPINE WO CONTRAST   Final Result    Motion limited study. No definite fracture. **This report has been created using voice recognition software. It may contain minor errors which are inherent in voice recognition technology. **      Final report electronically signed by Dr. Rufino Maldonado on 1/22/2018 11:01 AM      CT HEAD WO CONTRAST   Final Result    No evidence of an acute process. No change from prior. **This report has been created using voice recognition software. It may contain minor errors which are inherent in voice recognition technology. **      Final report electronically signed by Dr. Rufino Maldonado on 1/22/2018 10:59 AM          LABS:   Labs Reviewed   CBC WITH AUTO DIFFERENTIAL - Abnormal; Notable for the following:        Result Value    WBC 12.8 (*)     RBC 3.31 (*)     Hemoglobin 11.1 (*)     Hematocrit 32.7 (*)     MCV 98.7 (*)     MCH 33.4 (*)     RDW 15.1 (*)     Segs Absolute 11.4 (*)     Lymphocytes # 0.3 (*)     All other components within normal limits   COMPREHENSIVE METABOLIC PANEL - Abnormal; Notable for the following:     CREATININE 1.4 (*)     Total Protein 5.7 (*)     Alb 3.0 (*)     All other components within normal limits   GLOMERULAR FILTRATION RATE, ESTIMATED - Abnormal; Notable for the following:     Est, Glom Filt Rate 53 (*)     All other components within normal limits   OSMOLALITY - Abnormal; Notable for the following:     Osmolality Calc 274.7 (*)     All other components within normal limits   POCT GLUCOSE - Abnormal; Notable for the following:     POC Glucose 148 (*)     All other components within normal limits   POCT GLUCOSE - Abnormal; Notable for the following:     POC Glucose 136 (*)     All other components within normal limits   TROPONIN   ANION GAP   URINE RT REFLEX TO CULTURE   URINE DRUG SCREEN         EMERGENCY DEPARTMENT COURSE:   Vitals:    Vitals:    01/22/18 1007 01/22/18 1147 01/22/18 1259   BP: (!) 156/65 (!) 145/68 (!) 140/72   Pulse: 69 60 62   Resp: 16 14 14   Temp: 98.9 °F (37.2 °C)     TempSrc: Oral     SpO2: 96% 96% 96%   Weight: 270 lb (122.5 kg)     Height: 6' 2\" (1.88 m)         MDM    Medications   naloxone (NARCAN) injection 1 mg (1 mg Intravenous Given 1/22/18 1145)       Patient was seen and evaluated in the emergency department for reports of hypoglycemia. Upon arrival the patient had received glucagon and IV D50 per EMS and his blood sugar was within normal range, however the patient remained lethargic and not very responsive. Continued with the patient having a normal blood sugar 1 the department, so he was given a dose of Narcan. After the Narcan the patient did become more alert and was oriented. Patient was offered observation admission as his only antidiabetic medication is a long-acting sulfonylurea which was recommended per Dr. Tono Sandoval, the attending physician. The patient declines admission and states that he would want to go home because he is being admitted so much recently. Risks including death and permanent disability were explained to the patient and he states that he would still like to go home. He is instructed to return for any repeat hypoglycemia or mental status. CRITICAL CARE:   None    CONSULTS:  None    PROCEDURES:  None    FINAL IMPRESSION      1. Hypoglycemia          DISPOSITION/PLAN   The patient was Signed out AMA.     PATIENT REFERRED TO:  Thiago Gilbert NP  [address] [place] [address]  Kirk Blair [address] [phone]    Call today        DISCHARGE MEDICATIONS:  New Prescriptions    No medications on file       (Please note that portions of this note were completed with a voice recognition program.  Efforts were made to edit the dictations but occasionally words are mis-transcribed.)    Scribe:  Mala Abdi 1/22/18 10:25

## 2018-01-22 NOTE — TELEPHONE ENCOUNTER
Hold Amaryl and Metformin combo and continue monitor sugars. Hold until seen on 25th - recommend orange juice, grape juice or apple juice to help bring up quick.

## 2018-01-22 NOTE — ED NOTES
Pt yelling out. RN in room. Pt alert, stating his back \"was on fire. \" Pt able to recall events. States he thinks he hit his face on a coffee table and that his BS went low. Eitan Hewitt CNP notified of change in mental status.       Sherryle Median, RN  01/22/18 2269

## 2018-01-23 PROBLEM — E16.1 FASTING HYPOGLYCEMIA: Status: ACTIVE | Noted: 2018-01-23

## 2018-01-23 PROBLEM — Z98.890 STATUS POST LUMBAR SURGERY: Status: ACTIVE | Noted: 2018-01-23

## 2018-01-23 PROBLEM — D49.0 PANCREATIC TUMOR: Status: ACTIVE | Noted: 2018-01-23

## 2018-01-23 PROBLEM — E16.2 HYPOGLYCEMIA: Status: ACTIVE | Noted: 2018-01-23

## 2018-01-23 PROBLEM — D72.828 ACUTE NEUTROPHILIA: Status: ACTIVE | Noted: 2018-01-23

## 2018-01-23 LAB
AFP-TUMOR MARKER: 1.2 UG/L
ALBUMIN SERPL-MCNC: 3 G/DL (ref 3.5–5.1)
ALP BLD-CCNC: 65 U/L (ref 38–126)
ALT SERPL-CCNC: 17 U/L (ref 11–66)
ANION GAP SERPL CALCULATED.3IONS-SCNC: 15 MEQ/L (ref 8–16)
ANISOCYTOSIS: ABNORMAL
ANISOCYTOSIS: ABNORMAL
AST SERPL-CCNC: 18 U/L (ref 5–40)
AVERAGE GLUCOSE: 153 MG/DL (ref 70–126)
BASOPHILS # BLD: 0.2 %
BASOPHILS # BLD: 0.6 %
BASOPHILS ABSOLUTE: 0 THOU/MM3 (ref 0–0.1)
BASOPHILS ABSOLUTE: 0.1 THOU/MM3 (ref 0–0.1)
BILIRUB SERPL-MCNC: 0.3 MG/DL (ref 0.3–1.2)
BUN BLDV-MCNC: 17 MG/DL (ref 7–22)
BUN BLDV-MCNC: 19 MG/DL (ref 7–22)
C-REACTIVE PROTEIN: 11.69 MG/DL (ref 0–1)
CA 19-9: 28 U/ML (ref 0–35)
CALCIUM SERPL-MCNC: 9.1 MG/DL (ref 8.5–10.5)
CALCIUM SERPL-MCNC: 9.3 MG/DL (ref 8.5–10.5)
CEA: 9.9 NG/ML (ref 0–5)
CHLORIDE BLD-SCNC: 102 MEQ/L (ref 98–111)
CHLORIDE BLD-SCNC: 99 MEQ/L (ref 98–111)
CO2: 22 MEQ/L (ref 23–33)
CO2: 22 MEQ/L (ref 23–33)
CREAT SERPL-MCNC: 1.3 MG/DL (ref 0.4–1.2)
CREAT SERPL-MCNC: 1.4 MG/DL (ref 0.4–1.2)
EOSINOPHIL # BLD: 0.7 %
EOSINOPHIL # BLD: 1.1 %
EOSINOPHILS ABSOLUTE: 0.1 THOU/MM3 (ref 0–0.4)
EOSINOPHILS ABSOLUTE: 0.1 THOU/MM3 (ref 0–0.4)
GFR SERPL CREATININE-BSD FRML MDRD: 53 ML/MIN/1.73M2
GFR SERPL CREATININE-BSD FRML MDRD: 58 ML/MIN/1.73M2
GLUCOSE BLD-MCNC: 103 MG/DL (ref 70–108)
GLUCOSE BLD-MCNC: 104 MG/DL (ref 70–108)
GLUCOSE BLD-MCNC: 113 MG/DL (ref 70–108)
GLUCOSE BLD-MCNC: 114 MG/DL (ref 70–108)
GLUCOSE BLD-MCNC: 124 MG/DL (ref 70–108)
GLUCOSE BLD-MCNC: 128 MG/DL (ref 70–108)
GLUCOSE BLD-MCNC: 132 MG/DL (ref 70–108)
GLUCOSE BLD-MCNC: 148 MG/DL (ref 70–108)
GLUCOSE BLD-MCNC: 149 MG/DL (ref 70–108)
GLUCOSE BLD-MCNC: 194 MG/DL (ref 70–108)
GLUCOSE BLD-MCNC: 37 MG/DL (ref 70–108)
GLUCOSE BLD-MCNC: 53 MG/DL (ref 70–108)
GLUCOSE BLD-MCNC: 65 MG/DL (ref 70–108)
GLUCOSE BLD-MCNC: 80 MG/DL (ref 70–108)
GLUCOSE BLD-MCNC: 85 MG/DL (ref 70–108)
GLUCOSE BLD-MCNC: 95 MG/DL (ref 70–108)
HBA1C MFR BLD: 7.1 % (ref 4.4–6.4)
HCT VFR BLD CALC: 33 % (ref 42–52)
HCT VFR BLD CALC: 34.1 % (ref 42–52)
HEMOGLOBIN: 11.3 GM/DL (ref 14–18)
HEMOGLOBIN: 11.3 GM/DL (ref 14–18)
LYMPHOCYTES # BLD: 4.3 %
LYMPHOCYTES # BLD: 6.2 %
LYMPHOCYTES ABSOLUTE: 0.6 THOU/MM3 (ref 1–4.8)
LYMPHOCYTES ABSOLUTE: 0.8 THOU/MM3 (ref 1–4.8)
MCH RBC QN AUTO: 32.2 PG (ref 27–31)
MCH RBC QN AUTO: 33.4 PG (ref 27–31)
MCHC RBC AUTO-ENTMCNC: 33 GM/DL (ref 33–37)
MCHC RBC AUTO-ENTMCNC: 34.2 GM/DL (ref 33–37)
MCV RBC AUTO: 97.4 FL (ref 80–94)
MCV RBC AUTO: 97.6 FL (ref 80–94)
MONOCYTES # BLD: 8.3 %
MONOCYTES # BLD: 9.3 %
MONOCYTES ABSOLUTE: 1.2 THOU/MM3 (ref 0.4–1.3)
MONOCYTES ABSOLUTE: 1.3 THOU/MM3 (ref 0.4–1.3)
NUCLEATED RED BLOOD CELLS: 0 /100 WBC
NUCLEATED RED BLOOD CELLS: 0 /100 WBC
OSMOLALITY CALCULATION: 270.8 MOSMOL/KG (ref 275–300)
PDW BLD-RTO: 15.3 % (ref 11.5–14.5)
PDW BLD-RTO: 15.9 % (ref 11.5–14.5)
PLATELET # BLD: 219 THOU/MM3 (ref 130–400)
PLATELET # BLD: 246 THOU/MM3 (ref 130–400)
PMV BLD AUTO: 7.9 MCM (ref 7.4–10.4)
PMV BLD AUTO: 8 MCM (ref 7.4–10.4)
POTASSIUM REFLEX MAGNESIUM: 5.1 MEQ/L (ref 3.5–5.2)
POTASSIUM SERPL-SCNC: 5.2 MEQ/L (ref 3.5–5.2)
RBC # BLD: 3.38 MILL/MM3 (ref 4.7–6.1)
RBC # BLD: 3.5 MILL/MM3 (ref 4.7–6.1)
SEDIMENTATION RATE, ERYTHROCYTE: 126 MM/HR (ref 0–10)
SEG NEUTROPHILS: 82.8 %
SEG NEUTROPHILS: 86.5 %
SEGMENTED NEUTROPHILS ABSOLUTE COUNT: 10.8 THOU/MM3 (ref 1.8–7.7)
SEGMENTED NEUTROPHILS ABSOLUTE COUNT: 13.1 THOU/MM3 (ref 1.8–7.7)
SODIUM BLD-SCNC: 136 MEQ/L (ref 135–145)
SODIUM BLD-SCNC: 137 MEQ/L (ref 135–145)
TOTAL PROTEIN: 6 G/DL (ref 6.1–8)
TROPONIN T: < 0.01 NG/ML
WBC # BLD: 13.1 THOU/MM3 (ref 4.8–10.8)
WBC # BLD: 15.1 THOU/MM3 (ref 4.8–10.8)

## 2018-01-23 PROCEDURE — 36415 COLL VENOUS BLD VENIPUNCTURE: CPT

## 2018-01-23 PROCEDURE — 82105 ALPHA-FETOPROTEIN SERUM: CPT

## 2018-01-23 PROCEDURE — 86140 C-REACTIVE PROTEIN: CPT

## 2018-01-23 PROCEDURE — 85025 COMPLETE CBC W/AUTO DIFF WBC: CPT

## 2018-01-23 PROCEDURE — 80048 BASIC METABOLIC PNL TOTAL CA: CPT

## 2018-01-23 PROCEDURE — 82378 CARCINOEMBRYONIC ANTIGEN: CPT

## 2018-01-23 PROCEDURE — 2580000003 HC RX 258: Performed by: INTERNAL MEDICINE

## 2018-01-23 PROCEDURE — 2580000003 HC RX 258: Performed by: FAMILY MEDICINE

## 2018-01-23 PROCEDURE — 96365 THER/PROPH/DIAG IV INF INIT: CPT

## 2018-01-23 PROCEDURE — 80053 COMPREHEN METABOLIC PANEL: CPT

## 2018-01-23 PROCEDURE — 86301 IMMUNOASSAY TUMOR CA 19-9: CPT

## 2018-01-23 PROCEDURE — 84484 ASSAY OF TROPONIN QUANT: CPT

## 2018-01-23 PROCEDURE — 96376 TX/PRO/DX INJ SAME DRUG ADON: CPT

## 2018-01-23 PROCEDURE — 85651 RBC SED RATE NONAUTOMATED: CPT

## 2018-01-23 PROCEDURE — 1200000003 HC TELEMETRY R&B

## 2018-01-23 PROCEDURE — 82947 ASSAY GLUCOSE BLOOD QUANT: CPT

## 2018-01-23 PROCEDURE — 87040 BLOOD CULTURE FOR BACTERIA: CPT

## 2018-01-23 PROCEDURE — 99232 SBSQ HOSP IP/OBS MODERATE 35: CPT | Performed by: HOSPITALIST

## 2018-01-23 PROCEDURE — 83036 HEMOGLOBIN GLYCOSYLATED A1C: CPT

## 2018-01-23 PROCEDURE — 82948 REAGENT STRIP/BLOOD GLUCOSE: CPT

## 2018-01-23 PROCEDURE — 6370000000 HC RX 637 (ALT 250 FOR IP): Performed by: INTERNAL MEDICINE

## 2018-01-23 PROCEDURE — 6370000000 HC RX 637 (ALT 250 FOR IP): Performed by: HOSPITALIST

## 2018-01-23 RX ORDER — FUROSEMIDE 20 MG/1
20 TABLET ORAL DAILY
Status: DISCONTINUED | OUTPATIENT
Start: 2018-01-23 | End: 2018-01-24 | Stop reason: HOSPADM

## 2018-01-23 RX ORDER — FINASTERIDE 5 MG/1
5 TABLET, FILM COATED ORAL DAILY
Status: DISCONTINUED | OUTPATIENT
Start: 2018-01-23 | End: 2018-01-24 | Stop reason: HOSPADM

## 2018-01-23 RX ORDER — DEXTROSE MONOHYDRATE 25 G/50ML
25 INJECTION, SOLUTION INTRAVENOUS PRN
Status: DISCONTINUED | OUTPATIENT
Start: 2018-01-23 | End: 2018-01-24 | Stop reason: HOSPADM

## 2018-01-23 RX ORDER — SENNA PLUS 8.6 MG/1
2 TABLET ORAL 2 TIMES DAILY
Status: DISCONTINUED | OUTPATIENT
Start: 2018-01-23 | End: 2018-01-24 | Stop reason: HOSPADM

## 2018-01-23 RX ORDER — DEXTROSE MONOHYDRATE 100 MG/ML
INJECTION, SOLUTION INTRAVENOUS CONTINUOUS
Status: DISCONTINUED | OUTPATIENT
Start: 2018-01-23 | End: 2018-01-23 | Stop reason: ALTCHOICE

## 2018-01-23 RX ORDER — DOCUSATE SODIUM 100 MG/1
100 CAPSULE, LIQUID FILLED ORAL 2 TIMES DAILY
Status: DISCONTINUED | OUTPATIENT
Start: 2018-01-23 | End: 2018-01-24 | Stop reason: HOSPADM

## 2018-01-23 RX ORDER — DEXTROSE MONOHYDRATE 25 G/50ML
INJECTION, SOLUTION INTRAVENOUS
Status: DISPENSED
Start: 2018-01-23 | End: 2018-01-23

## 2018-01-23 RX ORDER — MEXILETINE HYDROCHLORIDE 150 MG/1
150 CAPSULE ORAL EVERY 8 HOURS SCHEDULED
Status: DISCONTINUED | OUTPATIENT
Start: 2018-01-23 | End: 2018-01-24 | Stop reason: HOSPADM

## 2018-01-23 RX ORDER — METOPROLOL TARTRATE 50 MG/1
50 TABLET, FILM COATED ORAL 3 TIMES DAILY
Status: DISCONTINUED | OUTPATIENT
Start: 2018-01-23 | End: 2018-01-24 | Stop reason: HOSPADM

## 2018-01-23 RX ORDER — HYDRALAZINE HYDROCHLORIDE 20 MG/ML
20 INJECTION INTRAMUSCULAR; INTRAVENOUS EVERY 4 HOURS PRN
Status: DISCONTINUED | OUTPATIENT
Start: 2018-01-23 | End: 2018-01-24 | Stop reason: HOSPADM

## 2018-01-23 RX ORDER — MORPHINE SULFATE 2 MG/ML
4 INJECTION, SOLUTION INTRAMUSCULAR; INTRAVENOUS EVERY 4 HOURS PRN
Status: DISCONTINUED | OUTPATIENT
Start: 2018-01-23 | End: 2018-01-24 | Stop reason: HOSPADM

## 2018-01-23 RX ORDER — OXYCODONE HYDROCHLORIDE 5 MG/1
5 TABLET ORAL EVERY 4 HOURS PRN
Status: DISCONTINUED | OUTPATIENT
Start: 2018-01-23 | End: 2018-01-24 | Stop reason: HOSPADM

## 2018-01-23 RX ORDER — HYDRALAZINE HYDROCHLORIDE 25 MG/1
25 TABLET, FILM COATED ORAL EVERY 8 HOURS SCHEDULED
Status: DISCONTINUED | OUTPATIENT
Start: 2018-01-23 | End: 2018-01-24 | Stop reason: HOSPADM

## 2018-01-23 RX ORDER — AMIODARONE HYDROCHLORIDE 200 MG/1
200 TABLET ORAL 2 TIMES DAILY
Status: DISCONTINUED | OUTPATIENT
Start: 2018-01-23 | End: 2018-01-24 | Stop reason: HOSPADM

## 2018-01-23 RX ORDER — ACETAMINOPHEN 325 MG/1
650 TABLET ORAL EVERY 4 HOURS PRN
Status: DISCONTINUED | OUTPATIENT
Start: 2018-01-23 | End: 2018-01-23 | Stop reason: ALTCHOICE

## 2018-01-23 RX ORDER — DIAZEPAM 5 MG/1
5 TABLET ORAL 2 TIMES DAILY
Status: DISCONTINUED | OUTPATIENT
Start: 2018-01-23 | End: 2018-01-24 | Stop reason: HOSPADM

## 2018-01-23 RX ORDER — POTASSIUM CHLORIDE 7.45 MG/ML
10 INJECTION INTRAVENOUS PRN
Status: DISCONTINUED | OUTPATIENT
Start: 2018-01-23 | End: 2018-01-24 | Stop reason: HOSPADM

## 2018-01-23 RX ORDER — POLYETHYLENE GLYCOL 3350 17 G/17G
17 POWDER, FOR SOLUTION ORAL 2 TIMES DAILY
Status: DISCONTINUED | OUTPATIENT
Start: 2018-01-23 | End: 2018-01-24 | Stop reason: HOSPADM

## 2018-01-23 RX ORDER — ACETAMINOPHEN 325 MG/1
650 TABLET ORAL EVERY 4 HOURS PRN
Status: DISCONTINUED | OUTPATIENT
Start: 2018-01-23 | End: 2018-01-24 | Stop reason: HOSPADM

## 2018-01-23 RX ORDER — DEXTROSE MONOHYDRATE 25 G/50ML
25 INJECTION, SOLUTION INTRAVENOUS ONCE
Status: COMPLETED | OUTPATIENT
Start: 2018-01-23 | End: 2018-01-23

## 2018-01-23 RX ORDER — BACLOFEN 10 MG/1
20 TABLET ORAL 3 TIMES DAILY
Status: DISCONTINUED | OUTPATIENT
Start: 2018-01-23 | End: 2018-01-24 | Stop reason: HOSPADM

## 2018-01-23 RX ORDER — POTASSIUM CHLORIDE 20 MEQ/1
20 TABLET, EXTENDED RELEASE ORAL 2 TIMES DAILY
Status: DISCONTINUED | OUTPATIENT
Start: 2018-01-23 | End: 2018-01-24 | Stop reason: HOSPADM

## 2018-01-23 RX ORDER — SIMVASTATIN 20 MG
20 TABLET ORAL NIGHTLY
Status: DISCONTINUED | OUTPATIENT
Start: 2018-01-23 | End: 2018-01-24 | Stop reason: HOSPADM

## 2018-01-23 RX ORDER — TAMSULOSIN HYDROCHLORIDE 0.4 MG/1
0.4 CAPSULE ORAL DAILY
Status: DISCONTINUED | OUTPATIENT
Start: 2018-01-23 | End: 2018-01-24 | Stop reason: HOSPADM

## 2018-01-23 RX ADMIN — FUROSEMIDE 20 MG: 20 TABLET ORAL at 09:51

## 2018-01-23 RX ADMIN — BACLOFEN 20 MG: 10 TABLET ORAL at 20:25

## 2018-01-23 RX ADMIN — SENNA 17.2 MG: 8.6 TABLET, COATED ORAL at 22:09

## 2018-01-23 RX ADMIN — DOCUSATE SODIUM 100 MG: 100 CAPSULE ORAL at 22:09

## 2018-01-23 RX ADMIN — OXYCODONE HYDROCHLORIDE 5 MG: 5 TABLET ORAL at 20:25

## 2018-01-23 RX ADMIN — MEXILETINE HYDROCHLORIDE 150 MG: 150 CAPSULE ORAL at 22:09

## 2018-01-23 RX ADMIN — HYDRALAZINE HYDROCHLORIDE 25 MG: 25 TABLET, FILM COATED ORAL at 15:01

## 2018-01-23 RX ADMIN — MEXILETINE HYDROCHLORIDE 150 MG: 150 CAPSULE ORAL at 15:01

## 2018-01-23 RX ADMIN — SACUBITRIL AND VALSARTAN 1 TABLET: 49; 51 TABLET, FILM COATED ORAL at 22:09

## 2018-01-23 RX ADMIN — FINASTERIDE 5 MG: 5 TABLET, FILM COATED ORAL at 09:51

## 2018-01-23 RX ADMIN — OXYCODONE HYDROCHLORIDE 5 MG: 5 TABLET ORAL at 06:51

## 2018-01-23 RX ADMIN — DIAZEPAM 5 MG: 5 TABLET ORAL at 09:56

## 2018-01-23 RX ADMIN — METOPROLOL TARTRATE 50 MG: 50 TABLET, FILM COATED ORAL at 20:24

## 2018-01-23 RX ADMIN — BACLOFEN 20 MG: 10 TABLET ORAL at 15:01

## 2018-01-23 RX ADMIN — HYDRALAZINE HYDROCHLORIDE 25 MG: 25 TABLET, FILM COATED ORAL at 22:09

## 2018-01-23 RX ADMIN — DEXTROSE MONOHYDRATE: 100 INJECTION, SOLUTION INTRAVENOUS at 04:27

## 2018-01-23 RX ADMIN — ACETAMINOPHEN 650 MG: 325 TABLET ORAL at 06:50

## 2018-01-23 RX ADMIN — DEXTROSE MONOHYDRATE 25 G: 500 INJECTION PARENTERAL at 00:59

## 2018-01-23 RX ADMIN — POTASSIUM CHLORIDE 20 MEQ: 1500 TABLET, EXTENDED RELEASE ORAL at 20:24

## 2018-01-23 RX ADMIN — METOPROLOL TARTRATE 50 MG: 50 TABLET, FILM COATED ORAL at 09:51

## 2018-01-23 RX ADMIN — POLYETHYLENE GLYCOL 3350 17 G: 17 POWDER, FOR SOLUTION ORAL at 22:09

## 2018-01-23 RX ADMIN — OXYCODONE HYDROCHLORIDE 5 MG: 5 TABLET ORAL at 15:43

## 2018-01-23 RX ADMIN — SACUBITRIL AND VALSARTAN 1 TABLET: 49; 51 TABLET, FILM COATED ORAL at 09:51

## 2018-01-23 RX ADMIN — AMIODARONE HYDROCHLORIDE 200 MG: 200 TABLET ORAL at 20:24

## 2018-01-23 RX ADMIN — AMIODARONE HYDROCHLORIDE 200 MG: 200 TABLET ORAL at 09:51

## 2018-01-23 RX ADMIN — DEXTROSE MONOHYDRATE 25 G: 500 INJECTION PARENTERAL at 03:13

## 2018-01-23 RX ADMIN — BACLOFEN 20 MG: 10 TABLET ORAL at 09:51

## 2018-01-23 RX ADMIN — DIAZEPAM 5 MG: 5 TABLET ORAL at 22:09

## 2018-01-23 RX ADMIN — MEXILETINE HYDROCHLORIDE 150 MG: 150 CAPSULE ORAL at 06:50

## 2018-01-23 RX ADMIN — DEXTROSE MONOHYDRATE: 100 INJECTION, SOLUTION INTRAVENOUS at 01:16

## 2018-01-23 RX ADMIN — HYDRALAZINE HYDROCHLORIDE 25 MG: 25 TABLET, FILM COATED ORAL at 06:01

## 2018-01-23 RX ADMIN — POTASSIUM CHLORIDE 20 MEQ: 1500 TABLET, EXTENDED RELEASE ORAL at 09:51

## 2018-01-23 RX ADMIN — OXYCODONE HYDROCHLORIDE 5 MG: 5 TABLET ORAL at 11:20

## 2018-01-23 RX ADMIN — METOPROLOL TARTRATE 50 MG: 50 TABLET, FILM COATED ORAL at 15:01

## 2018-01-23 RX ADMIN — TAMSULOSIN HYDROCHLORIDE 0.4 MG: 0.4 CAPSULE ORAL at 09:51

## 2018-01-23 RX ADMIN — SIMVASTATIN 20 MG: 20 TABLET, FILM COATED ORAL at 20:24

## 2018-01-23 ASSESSMENT — ENCOUNTER SYMPTOMS
RHINORRHEA: 0
VOMITING: 0
COUGH: 0
SORE THROAT: 0
EYE DISCHARGE: 0
DIARRHEA: 0
EYE REDNESS: 0
WHEEZING: 0
ABDOMINAL PAIN: 0
BACK PAIN: 1
NAUSEA: 0
SHORTNESS OF BREATH: 0

## 2018-01-23 ASSESSMENT — PAIN DESCRIPTION - FREQUENCY
FREQUENCY: CONTINUOUS

## 2018-01-23 ASSESSMENT — PAIN SCALES - GENERAL
PAINLEVEL_OUTOF10: 6
PAINLEVEL_OUTOF10: 0
PAINLEVEL_OUTOF10: 8
PAINLEVEL_OUTOF10: 8
PAINLEVEL_OUTOF10: 6
PAINLEVEL_OUTOF10: 8
PAINLEVEL_OUTOF10: 7
PAINLEVEL_OUTOF10: 8
PAINLEVEL_OUTOF10: 0

## 2018-01-23 ASSESSMENT — PAIN DESCRIPTION - ONSET
ONSET: ON-GOING

## 2018-01-23 ASSESSMENT — PAIN DESCRIPTION - ORIENTATION
ORIENTATION: LOWER;MID
ORIENTATION: LOWER;MID
ORIENTATION: LOWER
ORIENTATION: LOWER

## 2018-01-23 ASSESSMENT — PAIN DESCRIPTION - PROGRESSION: CLINICAL_PROGRESSION: GRADUALLY WORSENING

## 2018-01-23 ASSESSMENT — PAIN DESCRIPTION - DESCRIPTORS
DESCRIPTORS: ACHING

## 2018-01-23 ASSESSMENT — PAIN DESCRIPTION - PAIN TYPE
TYPE: ACUTE PAIN
TYPE: SURGICAL PAIN
TYPE: SURGICAL PAIN
TYPE: CHRONIC PAIN

## 2018-01-23 ASSESSMENT — PAIN DESCRIPTION - LOCATION
LOCATION: BACK

## 2018-01-23 NOTE — H&P
[address] [place] [address]                               HISTORY AND PHYSICAL    PATIENT NAME: ALESSIO SARABIA                      :        1965  MED REC NO:   [id number]                           ROOM:       0019  ACCOUNT NO:   [de-identified]                           ADMIT DATE: 2018  PROVIDER:     Artie Grace MD, [facility]        IDENTIFICATION:  A 07-XTEN-IBX white male. CHIEF COMPLAINT:  Low blood sugars. HISTORY OF PRESENT ILLNESS:  The patient had lumbar laminectomy and  exploration a few days ago by Dr. Jori Taylor to remove some paraspinal  tumors. His blood sugars have been low since he was discharged last  2017. He was started on Amaryl, metformin/linagliptin. These three  medications he states were new to him. Blood sugars have been in the 50s,  and he has been eating less food. After his back surgery, he says he got  some improvement, though he is still waiting on time to heal him. He has  no fever, cold, or chills. Little bit of back pain. Moving all four  extremities. Blood sugars during the day in the morning are high and later  in the day and evening are in the 50s after he takes his medications in the  morning. No dizziness. No lightheadedness. No chest pain. No  palpitations. When he arrived here, blood pressure was 137/71, pulse 72  per minute, respirations 18, temperature 98.7, pain 8/10, he has back pain. Blood glucose was 136, and then it dropped to 58, 80, 52, 80. The patient  drank for more than 30 years. He stopped two years ago, still smokes one  pack of cigarettes a day. His last MRI of the abdomen showed no cirrhosis  of the liver. This was done in 2017. He has a pancreatic tail mass  measuring 1.2 cm. This is a cystic mass, could be anything. He has  splenomegaly. He has not been hypoglycemic before now.     PAST MEDICAL HISTORY:  Diabetes, ventricular tachycardia status morning. The patient has had a CT  of the chest, abdomen, and pelvis looking for a cause.         Alber Flores MD, [facility]    D: 01/23/2018 3:37:14       T: 01/23/2018 5:57:07     [email or web address]  Job#: [id number]     Doc#: [id number]    CC:

## 2018-01-23 NOTE — PROGRESS NOTES
non-distended with normal bowel sounds. Musculoskeletal: No clubbing, cyanosis or edema bilaterally. Full range of motion without deformity. Back dry gauze dressing c/d/i  Skin: Skin color, texture, turgor normal.  No rashes or lesions. Neurologic:  Neurovascularly intact without any focal sensory/motor deficits. Cranial nerves: II-XII intact, grossly non-focal.  Psychiatric: Alert and oriented, thought content appropriate, normal insight  Capillary Refill: Brisk,< 3 seconds   Peripheral Pulses: +2 palpable, equal bilaterally       Labs:   Recent Labs      01/22/18   1026  01/23/18   0000  01/23/18   0552   WBC  12.8*  15.1*  13.1*   HGB  11.1*  11.3*  11.3*   HCT  32.7*  34.1*  33.0*   PLT  187  246  219     Recent Labs      01/21/18   0202  01/22/18   1026  01/23/18   0000  01/23/18   0552   NA  135  136  136  137   K  4.4  4.6  5.2   --    CL  100  101  99  102   CO2  20*  24  22*  22*   BUN  26*  21  19  17   CREATININE  1.7*  1.4*  1.4*  1.3*   CALCIUM  9.2  9.1  9.3  9.1     Recent Labs      01/21/18   0202  01/22/18   1026  01/23/18   0552   AST  21  20  18   ALT  19  18  17   BILITOT  0.4  0.3  0.3   ALKPHOS  65  63  65     No results for input(s): INR in the last 72 hours. No results for input(s): Wilhelminia Dasen in the last 72 hours.     Urinalysis:    Lab Results   Component Value Date    NITRU NEGATIVE 01/07/2018    WBCUA 10-15 01/07/2018    BACTERIA NONE 01/07/2018    RBCUA 3-5 01/07/2018    BLOODU NEGATIVE 01/07/2018    SPECGRAV >1.030 12/20/2017    GLUCOSEU NEGATIVE 01/07/2018       Radiology:  No orders to display       Diet: DIET GENERAL;  Dietary Nutrition Supplements: Diabetic Oral Supplement    DVT prophylaxis: [] Lovenox                                 [] SCDs                                 [] SQ Heparin                                 [] Encourage ambulation           [] Already on Anticoagulation     Disposition:    [] Home       [] TCU       [] Rehab       [] Psych       [] SNF

## 2018-01-23 NOTE — ED NOTES
Blood sugar rechecked on patient and applesauce and diet soda given. Pt states he cannot eat a meal tray to to an injury he received earlier today to his jaw. He states that he cannot chew.       Erik Danielle RN  01/22/18 2803

## 2018-01-23 NOTE — ED PROVIDER NOTES
discharge. No scleral icterus. Neck: Normal range of motion. Neck supple. No JVD present. Cardiovascular: Normal rate, regular rhythm and normal heart sounds. Exam reveals no gallop and no friction rub. No murmur heard. Pulmonary/Chest: Effort normal. No respiratory distress. He has no decreased breath sounds. He has wheezes in the right upper field and the right middle field. He has no rhonchi. He has no rales. Abdominal: Soft. He exhibits no distension. There is no tenderness. There is no rebound and no guarding. Musculoskeletal: Normal range of motion. He exhibits no edema. Neurological: He is alert and oriented to person, place, and time. He exhibits normal muscle tone. He displays no seizure activity. GCS eye subscore is 4. GCS verbal subscore is 5. GCS motor subscore is 6. Skin: Skin is warm and dry. No rash noted. He is not diaphoretic. Psychiatric: He has a normal mood and affect. His behavior is normal. Thought content normal.   Nursing note and vitals reviewed.       DIFFERENTIAL DIAGNOSIS:   Medication reaction, Hypoglycemia     DIAGNOSTIC RESULTS     EKG: All EKG's are interpreted by the Emergency Department Physician who either signs or Co-signs this chart in the absence of a cardiologist.  EKG interpreted by Umberto Orourke MD:    None    RADIOLOGY: non-plain film images(s) such as CT, Ultrasound and MRI are read by the radiologist.    No orders to display       LABS:   Labs Reviewed   CBC WITH AUTO DIFFERENTIAL - Abnormal; Notable for the following:        Result Value    WBC 15.1 (*)     RBC 3.50 (*)     Hemoglobin 11.3 (*)     Hematocrit 34.1 (*)     MCV 97.4 (*)     MCH 32.2 (*)     RDW 15.9 (*)     Segs Absolute 13.1 (*)     Lymphocytes # 0.6 (*)     All other components within normal limits   BASIC METABOLIC PANEL - Abnormal; Notable for the following:     CO2 22 (*)     Glucose 37 (*)     CREATININE 1.4 (*)     All other components within normal limits   OSMOLALITY - Abnormal; the scribe in my presence, and it accurately records my words and actions.     Althea Clarke MD 1/22/18 1:50 AM                          Althea Clarke MD  01/23/18 8627

## 2018-01-23 NOTE — ED NOTES
Pt given some orange juice, diet soda, applesauce, and peanut butter at this time.      Andrey Florian RN  01/23/18 5066

## 2018-01-23 NOTE — PLAN OF CARE
Problem: Falls - Risk of  Goal: Absence of falls  Outcome: Ongoing  No falls this shift, Patient compliant with Fall precautions. Problem: Pain:  Goal: Pain level will decrease  Pain level will decrease   Outcome: Ongoing  Continue to monitor patients pain level, patient continues to report pain, satisfied with pain relief with ordered medications     Problem: Serum Glucose Level - Abnormal:  Goal: Ability to maintain appropriate glucose levels will improve  Ability to maintain appropriate glucose levels will improve   Outcome: Ongoing  Patient maintaining normal glucose level this shift, patient remains on dextrose fluid at this time. Problem: Cardiovascular  Goal: No DVT, peripheral vascular complications  Outcome: Ongoing  No S/S of DVT this shift, continue with DVT prophy as ordered. Problem: GI  Goal: No bowel complications  Outcome: Ongoing  No bowel movement this shift, continue to monitor, medicate as needed. Problem: Skin Integrity/Risk  Goal: No skin breakdown during hospitalization  Outcome: Ongoing  No skin breakdown noted, continue to monitor surgical incision for healing. Problem: DAILY CARE  Goal: Daily care needs are met  Outcome: Ongoing  Patient able to perform own ADLs. Problem: DISCHARGE BARRIERS  Goal: Patient's continuum of care needs are met  Outcome: Ongoing  Plan return home when medically stable. Comments: Care plan reviewed with patient. Patient verbalizes understanding of the plan of care and contributes to goal setting.

## 2018-01-23 NOTE — ED NOTES
Pt reassessed. Denies any pain. Resting on cot, respirations even and unlabored. BS is 53 at this time, notified Dr. Bethany Hackett. Medication ordered and given per order at this time. Updated on POC, no complaints. SR up x2, call light in reach, family at bedside, will continue to monitor.      Andrey Florian RN  01/23/18 6400

## 2018-01-24 VITALS
SYSTOLIC BLOOD PRESSURE: 176 MMHG | TEMPERATURE: 98.3 F | OXYGEN SATURATION: 96 % | DIASTOLIC BLOOD PRESSURE: 62 MMHG | HEIGHT: 74 IN | WEIGHT: 270 LBS | BODY MASS INDEX: 34.65 KG/M2 | HEART RATE: 68 BPM | RESPIRATION RATE: 16 BRPM

## 2018-01-24 PROBLEM — E16.1 FASTING HYPOGLYCEMIA: Status: RESOLVED | Noted: 2018-01-23 | Resolved: 2018-01-24

## 2018-01-24 PROBLEM — E16.2 HYPOGLYCEMIA: Status: RESOLVED | Noted: 2018-01-23 | Resolved: 2018-01-24

## 2018-01-24 LAB
ALBUMIN SERPL-MCNC: 3 G/DL (ref 3.5–5.1)
ALP BLD-CCNC: 64 U/L (ref 38–126)
ALT SERPL-CCNC: 18 U/L (ref 11–66)
ANISOCYTOSIS: ABNORMAL
AST SERPL-CCNC: 19 U/L (ref 5–40)
BASOPHILS # BLD: 1.2 %
BASOPHILS ABSOLUTE: 0.2 THOU/MM3 (ref 0–0.1)
BILIRUB SERPL-MCNC: 0.4 MG/DL (ref 0.3–1.2)
BUN BLDV-MCNC: 19 MG/DL (ref 7–22)
CALCIUM SERPL-MCNC: 9 MG/DL (ref 8.5–10.5)
CHLORIDE BLD-SCNC: 101 MEQ/L (ref 98–111)
CO2: 24 MEQ/L (ref 23–33)
CREAT SERPL-MCNC: 1.3 MG/DL (ref 0.4–1.2)
EOSINOPHIL # BLD: 1.4 %
EOSINOPHILS ABSOLUTE: 0.2 THOU/MM3 (ref 0–0.4)
GFR SERPL CREATININE-BSD FRML MDRD: 58 ML/MIN/1.73M2
GLUCOSE BLD-MCNC: 154 MG/DL (ref 70–108)
GLUCOSE BLD-MCNC: 169 MG/DL (ref 70–108)
GLUCOSE BLD-MCNC: 170 MG/DL (ref 70–108)
GLUCOSE BLD-MCNC: 174 MG/DL (ref 70–108)
GLUCOSE BLD-MCNC: 189 MG/DL (ref 70–108)
GLUCOSE BLD-MCNC: 220 MG/DL (ref 70–108)
GLUCOSE BLD-MCNC: 384 MG/DL (ref 70–108)
HCT VFR BLD CALC: 32.1 % (ref 42–52)
HEMOGLOBIN: 10.8 GM/DL (ref 14–18)
LYMPHOCYTES # BLD: 6.2 %
LYMPHOCYTES ABSOLUTE: 0.8 THOU/MM3 (ref 1–4.8)
MCH RBC QN AUTO: 33.3 PG (ref 27–31)
MCHC RBC AUTO-ENTMCNC: 33.7 GM/DL (ref 33–37)
MCV RBC AUTO: 98.8 FL (ref 80–94)
MONOCYTES # BLD: 8.6 %
MONOCYTES ABSOLUTE: 1.1 THOU/MM3 (ref 0.4–1.3)
NUCLEATED RED BLOOD CELLS: 0 /100 WBC
PDW BLD-RTO: 15.2 % (ref 11.5–14.5)
PLATELET # BLD: 215 THOU/MM3 (ref 130–400)
PMV BLD AUTO: 7.9 MCM (ref 7.4–10.4)
POTASSIUM REFLEX MAGNESIUM: 5.1 MEQ/L (ref 3.5–5.2)
RBC # BLD: 3.25 MILL/MM3 (ref 4.7–6.1)
SEG NEUTROPHILS: 82.6 %
SEGMENTED NEUTROPHILS ABSOLUTE COUNT: 10.5 THOU/MM3 (ref 1.8–7.7)
SODIUM BLD-SCNC: 137 MEQ/L (ref 135–145)
TOTAL PROTEIN: 6.2 G/DL (ref 6.1–8)
WBC # BLD: 12.7 THOU/MM3 (ref 4.8–10.8)

## 2018-01-24 PROCEDURE — 85025 COMPLETE CBC W/AUTO DIFF WBC: CPT

## 2018-01-24 PROCEDURE — 82948 REAGENT STRIP/BLOOD GLUCOSE: CPT

## 2018-01-24 PROCEDURE — 99239 HOSP IP/OBS DSCHRG MGMT >30: CPT | Performed by: HOSPITALIST

## 2018-01-24 PROCEDURE — 6360000002 HC RX W HCPCS: Performed by: INTERNAL MEDICINE

## 2018-01-24 PROCEDURE — 6370000000 HC RX 637 (ALT 250 FOR IP): Performed by: HOSPITALIST

## 2018-01-24 PROCEDURE — 36415 COLL VENOUS BLD VENIPUNCTURE: CPT

## 2018-01-24 PROCEDURE — 6370000000 HC RX 637 (ALT 250 FOR IP): Performed by: FAMILY MEDICINE

## 2018-01-24 PROCEDURE — 6370000000 HC RX 637 (ALT 250 FOR IP): Performed by: INTERNAL MEDICINE

## 2018-01-24 PROCEDURE — 80053 COMPREHEN METABOLIC PANEL: CPT

## 2018-01-24 RX ORDER — NICOTINE POLACRILEX 4 MG
15 LOZENGE BUCCAL PRN
Status: DISCONTINUED | OUTPATIENT
Start: 2018-01-24 | End: 2018-01-24 | Stop reason: HOSPADM

## 2018-01-24 RX ORDER — DEXTROSE MONOHYDRATE 25 G/50ML
12.5 INJECTION, SOLUTION INTRAVENOUS PRN
Status: DISCONTINUED | OUTPATIENT
Start: 2018-01-24 | End: 2018-01-24 | Stop reason: HOSPADM

## 2018-01-24 RX ORDER — DEXTROSE AND SODIUM CHLORIDE 5; .9 G/100ML; G/100ML
100 INJECTION, SOLUTION INTRAVENOUS PRN
Status: DISCONTINUED | OUTPATIENT
Start: 2018-01-24 | End: 2018-01-24 | Stop reason: HOSPADM

## 2018-01-24 RX ADMIN — BACLOFEN 20 MG: 10 TABLET ORAL at 07:22

## 2018-01-24 RX ADMIN — OXYCODONE HYDROCHLORIDE 5 MG: 5 TABLET ORAL at 04:45

## 2018-01-24 RX ADMIN — FUROSEMIDE 20 MG: 20 TABLET ORAL at 07:22

## 2018-01-24 RX ADMIN — METOPROLOL TARTRATE 50 MG: 50 TABLET, FILM COATED ORAL at 07:21

## 2018-01-24 RX ADMIN — POTASSIUM CHLORIDE 20 MEQ: 1500 TABLET, EXTENDED RELEASE ORAL at 07:22

## 2018-01-24 RX ADMIN — HYDRALAZINE HYDROCHLORIDE 25 MG: 25 TABLET, FILM COATED ORAL at 07:21

## 2018-01-24 RX ADMIN — MEXILETINE HYDROCHLORIDE 150 MG: 150 CAPSULE ORAL at 07:21

## 2018-01-24 RX ADMIN — TAMSULOSIN HYDROCHLORIDE 0.4 MG: 0.4 CAPSULE ORAL at 07:23

## 2018-01-24 RX ADMIN — SACUBITRIL AND VALSARTAN 1 TABLET: 49; 51 TABLET, FILM COATED ORAL at 07:22

## 2018-01-24 RX ADMIN — FINASTERIDE 5 MG: 5 TABLET, FILM COATED ORAL at 07:22

## 2018-01-24 RX ADMIN — POLYETHYLENE GLYCOL 3350 17 G: 17 POWDER, FOR SOLUTION ORAL at 07:23

## 2018-01-24 RX ADMIN — METFORMIN HYDROCHLORIDE 500 MG: 500 TABLET ORAL at 07:21

## 2018-01-24 RX ADMIN — OXYCODONE HYDROCHLORIDE 5 MG: 5 TABLET ORAL at 00:41

## 2018-01-24 RX ADMIN — AMIODARONE HYDROCHLORIDE 200 MG: 200 TABLET ORAL at 07:22

## 2018-01-24 RX ADMIN — SENNA 17.2 MG: 8.6 TABLET, COATED ORAL at 07:21

## 2018-01-24 RX ADMIN — INSULIN LISPRO 1 UNITS: 100 INJECTION, SOLUTION INTRAVENOUS; SUBCUTANEOUS at 07:19

## 2018-01-24 RX ADMIN — DOCUSATE SODIUM 100 MG: 100 CAPSULE ORAL at 07:22

## 2018-01-24 RX ADMIN — ENOXAPARIN SODIUM 40 MG: 40 INJECTION SUBCUTANEOUS at 08:36

## 2018-01-24 RX ADMIN — OXYCODONE HYDROCHLORIDE 5 MG: 5 TABLET ORAL at 08:45

## 2018-01-24 RX ADMIN — OXYCODONE HYDROCHLORIDE 5 MG: 5 TABLET ORAL at 12:48

## 2018-01-24 RX ADMIN — Medication 1 UNITS: at 02:32

## 2018-01-24 ASSESSMENT — PAIN SCALES - GENERAL
PAINLEVEL_OUTOF10: 8
PAINLEVEL_OUTOF10: 8
PAINLEVEL_OUTOF10: 7
PAINLEVEL_OUTOF10: 7
PAINLEVEL_OUTOF10: 8
PAINLEVEL_OUTOF10: 8
PAINLEVEL_OUTOF10: 7

## 2018-01-24 ASSESSMENT — PAIN DESCRIPTION - ONSET
ONSET: ON-GOING
ONSET: ON-GOING

## 2018-01-24 ASSESSMENT — PAIN DESCRIPTION - DESCRIPTORS
DESCRIPTORS: ACHING
DESCRIPTORS: ACHING

## 2018-01-24 ASSESSMENT — PAIN DESCRIPTION - FREQUENCY
FREQUENCY: CONTINUOUS
FREQUENCY: CONTINUOUS

## 2018-01-24 ASSESSMENT — PAIN DESCRIPTION - ORIENTATION
ORIENTATION: LOWER
ORIENTATION: LOWER

## 2018-01-24 ASSESSMENT — PAIN DESCRIPTION - PAIN TYPE
TYPE: CHRONIC PAIN
TYPE: CHRONIC PAIN

## 2018-01-24 ASSESSMENT — PAIN DESCRIPTION - LOCATION
LOCATION: BACK
LOCATION: BACK

## 2018-01-24 NOTE — DISCHARGE SUMMARY
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 Hospital Course:   Estefani Amaya is a 46 y.o. male admitted to [facility] on 2018 for hypoglycemia after recent surgery, patient was not eating well and is on Amaryl and Metformin. He was also noted to be in accelerated htn  He was admitted and Amaryl and Metformin were stopped, blood sugars improved. He is noted to have a known pancreatic mass that he has already arranged followed upas an outpatient. His blood pressure meds were restarted, and he was given prn meds, that help blood pressure. Patient was able jerrod discharged with [facility] for ongoing  PT/OT and nursing to assist with medications.  Discharged in stable condition         Exam:     Vitals:  Vitals:    18 0015 18 0239 18 0600 18 0740   BP: (!) 165/80 (!) 176/56 (!) 160/77 (!) 176/62   Pulse: 74 65  68   Resp: 16 16  16   Temp: 98.5 °F (36.9 °C) 98.8 °F (37.1 °C)  98.3 °F (36.8 °C)   TempSrc: Oral Oral  Oral   SpO2: 98% 97%  96%   Weight:       Height:         Weight: Weight: 270 lb (122.5 kg)     24 hour intake/output:  Intake/Output Summary (Last 24 hours) at 18 1015  Last Other-HHS   Condition at Discharge: Stable    Code Status:  Full Code     Patient Instructions:    Discharge lab work: BMP  Activity: activity as tolerated  Please hold your Amaryl and Metfomin until you see your pcpc  Diet: DIET GENERAL;  Dietary Nutrition Supplements: Diabetic Oral Supplement      Follow-up visits:   Aileen Gutierrez NP  [address] [facility], [address]  [address]               Discharge Medications:      Charisse Garcia   [place] Medication Instructions MBQ:[id number]    Printed on:01/24/18 1015   Medication Information                      acetaminophen 650 MG TABS  Take 650 mg by mouth every 4 hours as needed             amiodarone (CORDARONE) 200 MG tablet  TAKE 1 TABLET BY MOUTH TWICE DAILY             baclofen (LIORESAL) 20 MG tablet  Take 1 tablet by mouth 3 times daily             diazepam (VALIUM) 5 MG tablet  Take 1 tablet by mouth 2 times daily for 10 days. finasteride (PROSCAR) 5 MG tablet  Take 1 tab Daily             furosemide (LASIX) 40 MG tablet  TAKE 1 TABLET DAILY FOR TREATMENT WITH DIURETIC THERAPY             glucose blood VI test strips (PHIL CONTOUR TEST) strip  1 each by In Vitro route daily             hydrALAZINE (APRESOLINE) 25 MG tablet  Take 1 tablet by mouth every 8 hours             metoprolol tartrate (LOPRESSOR) 50 MG tablet  TAKE 1 TABLET THREE TIMES A DAY FOR ATRIAL FIBRILLATION             mexiletine (MEXITIL) 150 MG capsule  TAKE 2 CAPSULES THREE TIMES A DAY FOR ATRIAL FIBRILLATION             oxyCODONE (ROXICODONE) 5 MG immediate release tablet  Take 1 tablet by mouth every 4 hours as needed for Pain for up to 7 days.              potassium chloride (KLOR-CON M) 20 MEQ extended release tablet  TAKE 1 TABLET TWICE A DAY             sacubitril-valsartan (ENTRESTO) 49-51 MG per tablet  Take 1 tablet by mouth 2 times daily             simvastatin (ZOCOR) 20 MG tablet  Take 1 tablet by mouth nightly             tamsulosin (FLOMAX) 0.4 MG

## 2018-01-24 NOTE — PLAN OF CARE
Problem: DISCHARGE BARRIERS  Goal: Patient's continuum of care needs are met  Outcome: Ongoing  Planning home with family support and home health. Please see progress note.

## 2018-01-24 NOTE — CONSULTS
01/24/18 0722    potassium chloride (KLOR-CON M) extended release tablet 20 mEq  20 mEq Oral BID Dejah Prescott MD   20 mEq at 01/24/18 1130    oxyCODONE (ROXICODONE) immediate release tablet 5 mg  5 mg Oral Q4H PRN Dejah Prescott MD   5 mg at 01/24/18 0845    diazepam (VALIUM) tablet 5 mg  5 mg Oral BID Dejah Prescott MD   5 mg at 01/24/18 0836    baclofen (LIORESAL) tablet 20 mg  20 mg Oral TID Dejah Prescott MD   20 mg at 01/24/18 0097    potassium chloride 10 mEq/100 mL IVPB (Peripheral Line)  10 mEq Intravenous PRN Dejah Prescott MD        dextrose 50 % solution 25 g  25 g Intravenous PRN Dejah Prescott MD   25 g at 01/23/18 0313    enoxaparin (LOVENOX) injection 40 mg  40 mg Subcutaneous Daily Dejah Prescott MD   40 mg at 01/24/18 0836    morphine injection 4 mg  4 mg Intravenous Q4H PRN Dejah Prescott MD        acetaminophen (TYLENOL) tablet 650 mg  650 mg Oral Q4H PRN Dejah Prescott MD   650 mg at 01/23/18 0650    polyethylene glycol (GLYCOLAX) packet 17 g  17 g Oral BID Lilibeth James MD   17 g at 01/24/18 0723    senna (SENOKOT) tablet 17.2 mg  2 tablet Oral BID Lilibeth James MD   17.2 mg at 01/24/18 0721    docusate sodium (COLACE) capsule 100 mg  100 mg Oral BID Lilibeth James MD   100 mg at 01/24/18 9354    hydrALAZINE (APRESOLINE) injection 20 mg  20 mg Intravenous Q4H PRN Lilibeth James MD              glucose 15 g PRN   dextrose 12.5 g PRN   glucagon (rDNA) 1 mg PRN   dextrose 5 % and 0.9 % NaCl 100 mL/hr PRN   oxyCODONE 5 mg Q4H PRN   potassium chloride 10 mEq PRN   dextrose 25 g PRN   morphine 4 mg Q4H PRN   acetaminophen 650 mg Q4H PRN   hydrALAZINE 20 mg Q4H PRN        dextrose 5 % and 0.9 % NaCl           ALLERGIES:   Pcn [penicillins] and Zoloft [sertraline hcl]    PAST MEDICAL  HISTORY:    has a past medical history of Acute systolic CHF (congestive heart failure) ([facility] [address]  Alcohol abuse; Anomalous origin of right coronary artery; Atrial fibrillation ([facility] [address] CAD (coronary artery disease); Cardiomyopathy ([facility] [address] Depression; Diabetes mellitus ([facility] [address] Drug abuse; H/O cardiac catheterization; Hyperlipidemia; Hypertension; Paroxysmal atrial fibrillation ([facility] [address] V tach ([facility] [address] and V-tach ([facility] [address] PAST SURGICAL  HISTORY:    has a past surgical history that includes Cardiac catheterization (3/20/14 ); Coronary artery bypass graft (5-9-14); other surgical history (Left, 10/21/14); EKG 12 Lead (7/17/2015); Cardiac defibrillator placement (10/13/2015); vascular surgery; pacemaker placement; and laminectomy,>2 sgmt,lumbar (N/A, 1/16/2018). SOCIAL HISTORY:   Social History   Substance Use Topics    Smoking status: Current Every Day Smoker     Packs/day: 1.00     Years: 32.00     Types: Cigarettes     Start date: 7/16/1980     Last attempt to quit: 1/20/2016    Smokeless tobacco: Former User    Alcohol use No       FAMILY HISTORY:  Family History   Problem Relation Age of Onset    Diabetes Mother     High Blood Pressure Mother     Stroke Mother     Diabetes Father     Heart Disease Father     High Blood Pressure Father     Heart Disease Sister      CABG    Diabetes Maternal Grandmother     Diabetes Maternal Grandfather     Heart Disease Paternal Grandfather        LABS  FINAL DIAGNOSIS:  A.  L1 intradural lesion, for frozen section, biopsy:   Nonviable tissue. B.  L1 intradural lesion #2, biopsy:   Low-grade glial neoplasm, favor low-grade ependymoma, WHO grade II. Please see consultant's report from [facility] located under  Media tab, Pathology, in [employer]. Specimen:  A) BIOPSY, L1 INTRADURAL LESION, FS  B) BIOPSY, L1 INTRADURAL LESION # 2    Intraoperative Consultation:  A - Frozen Section DX:  Necrotic tissue.   EKM   Received:  11:44 a.m.  Reported:  11:51 a.m.    Gross Examination:  A - The container is labeled Renée Henley, L1 intradural lesion.   Received fresh for

## 2018-01-24 NOTE — PLAN OF CARE
Problem: Falls - Risk of  Goal: Absence of falls  Outcome: Ongoing  Side rails up 2/4, call light within reach, steady on feet. Patient up ad mikal with slippers on. Uses call light appropriately     Problem: Pain:  Goal: Pain level will decrease  Pain level will decrease   Outcome: Ongoing  Patient states back pain at a 8 on scale. Pain managed with Roxicodone. White board updated. Pain goal 3 on scale. Goal: Control of acute pain  Control of acute pain   Outcome: Ongoing  Patient states pain at a 8 on scale to back. Pain managed with Roxicodone. White board updated   Goal: Control of chronic pain  Control of chronic pain   Outcome: Ongoing  Patient states back pain at an 8 on scale. Pain managed with Roxicodone. Problem: Serum Glucose Level - Abnormal:  Goal: Ability to maintain appropriate glucose levels will improve  Ability to maintain appropriate glucose levels will improve   Outcome: Ongoing  Blood glucose monitored frequently. Elevated blood sugar. Problem: Cardiovascular  Goal: No DVT, peripheral vascular complications  Outcome: Ongoing  Peripheral vascular within normal limits. Elevated blood pressure. Denies calf pain or tenderness. Problem: GI  Goal: No bowel complications  Outcome: Ongoing  Bowel sounds active. Passing gas. Stool softeners given. States constipation     Problem: Skin Integrity/Risk  Goal: No skin breakdown during hospitalization  Outcome: Ongoing  Surgical incision to back, dressing clean, dry, and intact. No drainage. Patient up ad mikal and repositions in bed as needed. Problem: DAILY CARE  Goal: Daily care needs are met  Outcome: Ongoing  Patient able to perform ADLs independently. Patient up ad mikal. Problem: DISCHARGE BARRIERS  Goal: Patient's continuum of care needs are met  Outcome: Ongoing  Patient plans on discharge to home. Problem: Nutrition  Goal: Optimal nutrition therapy  Outcome: Ongoing  Patient on general diet. Blood sugar monitored.      Comments: Care plan reviewed with patient. Patient verbalize understanding of the plan of care and contribute to goal setting.

## 2018-01-25 ENCOUNTER — TELEPHONE (OUTPATIENT)
Dept: FAMILY MEDICINE CLINIC | Age: 53
End: 2018-01-25

## 2018-01-25 ENCOUNTER — PROCEDURE VISIT (OUTPATIENT)
Dept: CARDIOLOGY CLINIC | Age: 53
End: 2018-01-25

## 2018-01-25 ENCOUNTER — OFFICE VISIT (OUTPATIENT)
Dept: FAMILY MEDICINE CLINIC | Age: 53
End: 2018-01-25
Payer: COMMERCIAL

## 2018-01-25 VITALS
DIASTOLIC BLOOD PRESSURE: 62 MMHG | HEART RATE: 72 BPM | WEIGHT: 274.8 LBS | BODY MASS INDEX: 35.27 KG/M2 | SYSTOLIC BLOOD PRESSURE: 120 MMHG | RESPIRATION RATE: 16 BRPM | HEIGHT: 74 IN

## 2018-01-25 DIAGNOSIS — I25.10 CORONARY ARTERY DISEASE INVOLVING NATIVE CORONARY ARTERY OF NATIVE HEART WITHOUT ANGINA PECTORIS: ICD-10-CM

## 2018-01-25 DIAGNOSIS — I10 ESSENTIAL HYPERTENSION: ICD-10-CM

## 2018-01-25 DIAGNOSIS — F41.3 OTHER MIXED ANXIETY DISORDERS: ICD-10-CM

## 2018-01-25 DIAGNOSIS — D33.4 SCHWANNOMA OF SPINAL CORD (HCC): ICD-10-CM

## 2018-01-25 DIAGNOSIS — N18.30 CKD (CHRONIC KIDNEY DISEASE), STAGE III (HCC): ICD-10-CM

## 2018-01-25 DIAGNOSIS — D49.2 LUMBAR SPINE TUMOR: ICD-10-CM

## 2018-01-25 DIAGNOSIS — E16.2 HYPOGLYCEMIA: Primary | ICD-10-CM

## 2018-01-25 DIAGNOSIS — D49.7 INTRADURAL EXTRAMEDULLARY SPINAL TUMOR: ICD-10-CM

## 2018-01-25 DIAGNOSIS — I50.22 CHRONIC SYSTOLIC CONGESTIVE HEART FAILURE, NYHA CLASS 2 (HCC): ICD-10-CM

## 2018-01-25 DIAGNOSIS — I25.5 ISCHEMIC CARDIOMYOPATHY: ICD-10-CM

## 2018-01-25 DIAGNOSIS — Z79.01 ANTICOAGULATED ON COUMADIN: ICD-10-CM

## 2018-01-25 DIAGNOSIS — I50.22 CHRONIC SYSTOLIC CONGESTIVE HEART FAILURE (HCC): Primary | ICD-10-CM

## 2018-01-25 DIAGNOSIS — E11.22 CONTROLLED TYPE 2 DIABETES MELLITUS WITH CHRONIC KIDNEY DISEASE, WITHOUT LONG-TERM CURRENT USE OF INSULIN, UNSPECIFIED CKD STAGE (HCC): ICD-10-CM

## 2018-01-25 DIAGNOSIS — E78.5 HYPERLIPIDEMIA, UNSPECIFIED HYPERLIPIDEMIA TYPE: ICD-10-CM

## 2018-01-25 DIAGNOSIS — D49.0 PANCREATIC TUMOR: ICD-10-CM

## 2018-01-25 DIAGNOSIS — Z95.810 S/P ICD (INTERNAL CARDIAC DEFIBRILLATOR) PROCEDURE: ICD-10-CM

## 2018-01-25 PROCEDURE — 99215 OFFICE O/P EST HI 40 MIN: CPT | Performed by: NURSE PRACTITIONER

## 2018-01-25 RX ORDER — OXYCODONE HYDROCHLORIDE 5 MG/1
5 TABLET ORAL EVERY 6 HOURS PRN
Qty: 8 TABLET | Refills: 0 | Status: SHIPPED | OUTPATIENT
Start: 2018-01-25 | End: 2018-01-27

## 2018-01-25 ASSESSMENT — ENCOUNTER SYMPTOMS
SHORTNESS OF BREATH: 0
NAUSEA: 0
BACK PAIN: 1
ABDOMINAL PAIN: 0
COUGH: 0

## 2018-01-25 NOTE — TELEPHONE ENCOUNTER
Oumou Gomez called requesting a refill on the following medications:  Requested Prescriptions     Pending Prescriptions Disp Refills    oxyCODONE (ROXICODONE) 5 MG immediate release tablet 30 tablet 0     Sig: Take 1 tablet by mouth every 4 hours as needed for Pain for up to 7 days.      Pharmacy verified:  [facility]      Date of last visit: 1/8/18  Date of next visit (if applicable): 4/06/5214

## 2018-01-25 NOTE — PROGRESS NOTES
Subjective:      Patient ID: Gustabo Restrepo is a 46 y.o. male.     HPI    Review of Systems    Objective:   Physical Exam    Assessment:      ***      Plan:      ***
Visit Information    Have you changed or started any medications since your last visit including any over-the-counter medicines, vitamins, or herbal medicines? No   Are you having any side effects from any of your medications? -  no  Have you stopped taking any of your medications? Is so, why? -  yes - all DM meds due to hypoglycemia    Have you seen any other physician or provider since your last visit? Yes - Records Obtained  Have you had any other diagnostic tests since your last visit? Yes - Records Obtained  Have you been seen in the emergency room and/or had an admission to a hospital since we last saw you? No  Have you had your routine dental cleaning in the past 6 months? no    Have you activated your [employer] account? If not, what are your barriers?  No: declines     Patient Care Team:  Edda Porras NP as PCP - General (Certified Nurse Practitioner)  Mckenna Rubin MD as Consulting Physician (Orthopedic Surgery)    Medical History Review  Past Medical, Family, and Social History reviewed and does contribute to the patient presenting condition    Health Maintenance   Topic Date Due    Pneumococcal highest risk (1 of 3 - PCV13) 05/20/1984    Colon cancer screen colonoscopy  05/20/2015    Lipid screen  07/17/2016    Diabetic microalbuminuria test  12/15/2016    Diabetic retinal exam  12/15/2016    TSH testing  01/04/2018    Flu vaccine (1) 01/04/2019 (Originally 9/1/2017)    Diabetic foot exam  07/07/2018    A1C test (Diabetic or Prediabetic)  01/23/2019    Potassium monitoring  01/24/2019    Creatinine monitoring  01/24/2019    DTaP/Tdap/Td vaccine (2 - Td) 06/25/2027    Hepatitis C screen  Addressed    HIV screen  Addressed
Wt Readings from Last 3 Encounters:   01/25/18 274 lb 12.8 oz (124.6 kg)   01/22/18 270 lb (122.5 kg)   01/22/18 270 lb (122.5 kg)     BP Readings from Last 3 Encounters:   01/25/18 120/62   01/24/18 (!) 176/62   01/22/18 (!) 140/72        Patient was admitted to [address] [place] [address] from 1/22/18 to 1/24/18 for Hypoglycemia. Inpatient course: Discharge summary reviewed- see chart. Current status: BS in 200s. Not on any BS medications    Review of Systems:  Review of Systems   Constitutional: Positive for fatigue. Negative for chills and fever. HENT: Negative. Respiratory: Negative for cough and shortness of breath. Cardiovascular: Negative for chest pain. Gastrointestinal: Negative for abdominal pain and nausea. Musculoskeletal: Positive for arthralgias and back pain. Skin: Negative for rash. Neurological: Negative for dizziness, light-headedness and headaches. Psychiatric/Behavioral: Positive for dysphoric mood. The patient is nervous/anxious. Physical Exam:  Physical Exam   Constitutional: He is oriented to person, place, and time. Vital signs are normal. He appears well-developed and well-nourished. He is active. He does not have a sickly appearance. No distress. HENT:   Right Ear: Tympanic membrane normal.   Left Ear: Tympanic membrane normal.   Nose: Nose normal.   Mouth/Throat: Oropharynx is clear and moist and mucous membranes are normal.   Cardiovascular: Normal rate, regular rhythm, S1 normal, S2 normal, normal heart sounds and normal pulses. Exam reveals no S3. No murmur heard. Pulmonary/Chest: Effort normal and breath sounds normal. He has no decreased breath sounds. He has no wheezes. He has no rhonchi. Abdominal: Soft. Bowel sounds are normal. There is no tenderness. Musculoskeletal:        Right shoulder: He exhibits decreased range of motion, tenderness, pain and decreased strength. Lumbar back: He exhibits decreased range of motion and pain.

## 2018-01-26 ENCOUNTER — OFFICE VISIT (OUTPATIENT)
Dept: NEUROSURGERY | Age: 53
End: 2018-01-26

## 2018-01-26 ENCOUNTER — TELEPHONE (OUTPATIENT)
Dept: FAMILY MEDICINE CLINIC | Age: 53
End: 2018-01-26

## 2018-01-26 ENCOUNTER — HOSPITAL ENCOUNTER (OUTPATIENT)
Age: 53
Discharge: HOME | End: 2018-01-26
Payer: COMMERCIAL

## 2018-01-26 VITALS
HEART RATE: 77 BPM | SYSTOLIC BLOOD PRESSURE: 155 MMHG | HEIGHT: 74 IN | WEIGHT: 277.8 LBS | BODY MASS INDEX: 35.65 KG/M2 | DIASTOLIC BLOOD PRESSURE: 83 MMHG

## 2018-01-26 DIAGNOSIS — D49.2 LUMBAR SPINE TUMOR: Primary | ICD-10-CM

## 2018-01-26 DIAGNOSIS — Z98.890 STATUS POST LUMBAR SURGERY: ICD-10-CM

## 2018-01-26 LAB — CA 19-9: 31 U/ML (ref 0–35)

## 2018-01-26 PROCEDURE — 99024 POSTOP FOLLOW-UP VISIT: CPT | Performed by: NURSE PRACTITIONER

## 2018-01-26 PROCEDURE — 36415 COLL VENOUS BLD VENIPUNCTURE: CPT

## 2018-01-26 PROCEDURE — 86301 IMMUNOASSAY TUMOR CA 19-9: CPT

## 2018-01-26 ASSESSMENT — ENCOUNTER SYMPTOMS
SORE THROAT: 0
SINUS PAIN: 0
NAUSEA: 0
TROUBLE SWALLOWING: 0
FACIAL SWELLING: 0
VOMITING: 0
COLOR CHANGE: 0
DIARRHEA: 0
ABDOMINAL PAIN: 0
EYE PAIN: 0
SHORTNESS OF BREATH: 0
COUGH: 0
BACK PAIN: 1
WHEEZING: 0

## 2018-01-26 NOTE — PROGRESS NOTES
having any spasms in his legs. Pt is still having back pain around the incision area, he is still taking oxycodone for the pain. Pt encouraged to decrease the oxycodone, and supplement with tylenol and his baclofen. Pt was offered flexeril, but states that he doesn't think that has worked for him in the past.    - Pt has a follow up appointment with Dr Ankur Gates on Monday 1/29/18 to discuss follow up from the results of the tumor removal, his pancreatic mass, and plan of care. Pt had an appointment with his PCP yesterday for follow up from the hospital and medication changes  - Follow up in 1 month with Dr Sobeida Sawyer  - MRI ordered for 6 weeks to re-evaluate the second tumor  - OT and PT at home as scheduled  - call office with any questions or concerns    Discussed use, benefit, and side effects of prescribed medications. Barriers to medication compliance addressed. All patient questions answered. Pt voiced understanding. Return in about 1 month (around 2/26/2018) for Post-op follow up. Orders Placed This Encounter   Procedures    MRI BRAIN W WO CONTRAST     Standing Status:   Future     Standing Expiration Date:   1/26/2019     Order Specific Question:   Reason for exam:     Answer:   lumbar spin tumor     No orders of the defined types were placed in this encounter. Patient given educational materials - see patient instructions. Discussed use, benefit, and side effects of prescribed medications. All patient questions answered. Pt voiced understanding. Reviewed health maintenance. Instructed to continue current medications, diet and exercise. Patient agreed with treatment plan. Follow up as directed.      Electronically signed by Harsha Gonsales CNP on 1/26/2018 at 10:45 AM

## 2018-01-28 LAB
BLOOD CULTURE, ROUTINE: NORMAL
BLOOD CULTURE, ROUTINE: NORMAL

## 2018-01-29 ENCOUNTER — TELEPHONE (OUTPATIENT)
Dept: FAMILY MEDICINE CLINIC | Age: 53
End: 2018-01-29

## 2018-01-29 DIAGNOSIS — D49.2 LUMBAR SPINE TUMOR: ICD-10-CM

## 2018-01-29 DIAGNOSIS — M54.50 CHRONIC MIDLINE LOW BACK PAIN WITHOUT SCIATICA: ICD-10-CM

## 2018-01-29 DIAGNOSIS — M51.36 DDD (DEGENERATIVE DISC DISEASE), LUMBAR: Primary | ICD-10-CM

## 2018-01-29 DIAGNOSIS — G89.29 CHRONIC MIDLINE LOW BACK PAIN WITHOUT SCIATICA: ICD-10-CM

## 2018-01-31 ENCOUNTER — PROCEDURE VISIT (OUTPATIENT)
Dept: CARDIOLOGY CLINIC | Age: 53
End: 2018-01-31
Payer: COMMERCIAL

## 2018-01-31 ENCOUNTER — TELEPHONE (OUTPATIENT)
Dept: CARDIOLOGY CLINIC | Age: 53
End: 2018-01-31

## 2018-01-31 ENCOUNTER — TELEPHONE (OUTPATIENT)
Dept: PHARMACY | Age: 53
End: 2018-01-31

## 2018-01-31 ENCOUNTER — OFFICE VISIT (OUTPATIENT)
Dept: CARDIOLOGY CLINIC | Age: 53
End: 2018-01-31
Payer: COMMERCIAL

## 2018-01-31 VITALS
HEIGHT: 74 IN | BODY MASS INDEX: 34.52 KG/M2 | SYSTOLIC BLOOD PRESSURE: 130 MMHG | DIASTOLIC BLOOD PRESSURE: 72 MMHG | WEIGHT: 269 LBS | OXYGEN SATURATION: 97 % | HEART RATE: 55 BPM

## 2018-01-31 DIAGNOSIS — S39.012A STRAIN OF LUMBAR REGION, INITIAL ENCOUNTER: ICD-10-CM

## 2018-01-31 DIAGNOSIS — Z95.810 S/P ICD (INTERNAL CARDIAC DEFIBRILLATOR) PROCEDURE: Primary | ICD-10-CM

## 2018-01-31 DIAGNOSIS — I50.42 CHF (CONGESTIVE HEART FAILURE), NYHA CLASS I, CHRONIC, COMBINED (HCC): Primary | ICD-10-CM

## 2018-01-31 PROCEDURE — 99213 OFFICE O/P EST LOW 20 MIN: CPT | Performed by: NURSE PRACTITIONER

## 2018-01-31 PROCEDURE — 93283 PRGRMG EVAL IMPLANTABLE DFB: CPT | Performed by: INTERNAL MEDICINE

## 2018-01-31 RX ORDER — POTASSIUM CHLORIDE 20 MEQ/1
TABLET, EXTENDED RELEASE ORAL
Qty: 180 TABLET | Refills: 1 | Status: ON HOLD
Start: 2018-01-31 | End: 2018-05-19 | Stop reason: HOSPADM

## 2018-01-31 RX ORDER — TAMSULOSIN HYDROCHLORIDE 0.4 MG/1
0.4 CAPSULE ORAL DAILY
Qty: 30 CAPSULE | Refills: 0 | Status: SHIPPED | OUTPATIENT
Start: 2018-01-31 | End: 2018-02-05

## 2018-01-31 RX ORDER — TAMSULOSIN HYDROCHLORIDE 0.4 MG/1
0.4 CAPSULE ORAL DAILY
Qty: 90 CAPSULE | Refills: 3 | Status: SHIPPED | OUTPATIENT
Start: 2018-01-31 | End: 2019-03-03 | Stop reason: SDUPTHER

## 2018-01-31 ASSESSMENT — ENCOUNTER SYMPTOMS
COLOR CHANGE: 0
SHORTNESS OF BREATH: 0
WHEEZING: 0
COUGH: 0
ABDOMINAL PAIN: 0
NAUSEA: 0
CHEST TIGHTNESS: 0
ABDOMINAL DISTENTION: 0
APNEA: 0

## 2018-01-31 NOTE — TELEPHONE ENCOUNTER
Patient called to report he's to go back on Coumadin - saw Sharmin Sanders CNP, at cardiology office today. (This clinic had discharged patient when Coumadin stopped. Was in hospital prolonged period of time and had back surgery last week.)    Cannot yet see notes from CNP. Will check with that office and call patient back. Left message on nurse's line at Dr. Valeri Joyner office seeking to confirm Coumadin is to be resumed and, if so, will need new referral signed.   Forwarded to pharmacist.

## 2018-01-31 NOTE — PROGRESS NOTES
Heart Failure Clinic       Visit Date: 1/31/2018  Cardiologist:   [facility]  Primary Care Physician: Dr. Afua Gregg, NP    Mortimer Rater is a 46 y.o. male who presents today for:  Chief Complaint   Patient presents with    Congestive Heart Failure     new patient        HPI:   Mortimer Rater is a 46 y.o. male who presents to the office for a new patient visit in the heart failure clinic. He had a recent back surgery, states he \"filled up with fluid. \" He is on Entresto. He does not add salt to his food but does not monitor his sodium. He drinks 2 - 32 oz diet sodas a day plus 1-2 bottles of water. He sleeps in a bed on 2 pillows. He can walk at least 100 yards and perform ADLs without fatigue or SOB.      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    Atrial fibrillation ([address]     CAD (coronary artery disease) 3/25/2014    s/p CABG in may 2014    Cardiomyopathy ([address]     Depression     Diabetes mellitus ([address]     Drug abuse     hx of cacaine abuse, stopped abusing drugs in 2012    H/O cardiac catheterization 4/30/2014    Hyperlipidemia     Hypertension     Paroxysmal atrial fibrillation ([address] 7/22/2014    [facility]     V-tach [facility])     s/p ablation in dec 2014     Past Surgical History:   Procedure Laterality Date    CARDIAC CATHETERIZATION  3/20/14     [facility]    CARDIAC DEFIBRILLATOR PLACEMENT  10/13/2015    [employer] [place], MRI CONDITIONAL ICD    CORONARY ARTERY BYPASS GRAFT  5-9-14    3 bypass    EKG 12-LEAD  7/17/2015         OTHER SURGICAL HISTORY Left 10/21/14    Left Bursectomy, I & D Left Elbow - Dr. Reji Reyes LAMINECTOMY,>2 [facility] N/A 1/16/2018    LUMBAR AND SACRAL LAMINECTOMY, REMOVAL OF INTRASPINAL TUMORS performed by Sandra Childs MD at [id number] harvest from legs     Family History   Problem Relation Age of Onset    Diabetes Mother     High Blood Pressure Mother     Stroke Mother     Diabetes Father     Heart Disease Father     High Blood Pressure Father     Heart Disease Sister      CABG    Diabetes Maternal Grandmother     Diabetes Maternal Grandfather     Heart Disease Paternal Grandfather      Social History   Substance Use Topics    Smoking status: Current Every Day Smoker     Packs/day: 1.00     Years: 32.00     Types: Cigarettes     Start date: 7/16/1980     Last attempt to quit: 1/20/2016    Smokeless tobacco: Former User    Alcohol use No     Current Outpatient Prescriptions   Medication Sig Dispense Refill    potassium chloride (KLOR-CON M) 20 MEQ extended release tablet TAKE 1 TABLET daily 180 tablet 1    metFORMIN (GLUCOPHAGE) 1000 MG tablet Take 1 tablet by mouth 2 times daily (with meals) 60 tablet 5    baclofen (LIORESAL) 20 MG tablet Take 1 tablet by mouth 3 times daily 60 tablet 2    finasteride (PROSCAR) 5 MG tablet Take 1 tab Daily 90 tablet 3    sacubitril-valsartan (ENTRESTO) 49-51 MG per tablet Take 1 tablet by mouth 2 times daily 180 tablet 0    hydrALAZINE (APRESOLINE) 25 MG tablet Take 1 tablet by mouth every 8 hours 90 tablet 3    simvastatin (ZOCOR) 20 MG tablet Take 1 tablet by mouth nightly 90 tablet 3    amiodarone (CORDARONE) 200 MG tablet TAKE 1 TABLET BY MOUTH TWICE DAILY 60 tablet 5    mexiletine (MEXITIL) 150 MG capsule TAKE 2 CAPSULES THREE TIMES A DAY FOR ATRIAL FIBRILLATION 540 capsule 3 and normal heart sounds. No murmur heard. +ICD   Pulmonary/Chest: Effort normal. No respiratory distress. He has no rales. Abdominal: Soft. He exhibits no distension. There is no tenderness. Musculoskeletal: He exhibits no edema or tenderness. Neurological: He is alert and oriented to person, place, and time. Skin: Skin is warm and dry. Psychiatric: He has a normal mood and affect. His behavior is normal.   Vitals reviewed. Wt Readings from Last 3 Encounters:   01/31/18 269 lb (122 kg)   01/26/18 277 lb 12.8 oz (126 kg)   01/25/18 274 lb 12.8 oz (124.6 kg)     BP Readings from Last 3 Encounters:   01/31/18 130/72   01/26/18 (!) 155/83   01/25/18 120/62     Pulse Readings from Last 3 Encounters:   01/31/18 55   01/26/18 77   01/25/18 72     Body mass index is 34.54 kg/m². ECHO:   12/21/17   Summary   Left Ventricular size is Mildly increased .   Normal left ventricular wall thickness.   There was moderate global hypokinesis of the left ventricle.   Systolic function was moderately reduced.   Ejection fraction is visually estimated at 40%.  Doppler parameters were consistent with abnormal left ventricular   relaxation (grade 1 diastolic dysfunction).   The left atrium is Moderately dilated.   Pacer Wire visualized in right ventricle.      Signature      ----------------------------------------------------------------   Electronically signed by Candance Gaw MD (Interpreting   physician) on 12/21/2017 at 05:39 PM   ----------------------------------------------------------------      Findings      Mitral Valve   The mitral valve structure was normal with normal leaflet separation.   DOPPLER: The transmitral velocity was within the normal range with no   evidence for mitral stenosis. Mild mitral regurgitation is present.      Aortic Valve   The aortic valve was trileaflet with normal thickness and cuspal   separation.  DOPPLER: Transaortic velocity was within the normal range with   no evidence of GLUCOSE 154 01/24/2018    CALCIUM 9.0 01/24/2018     Hepatic Function Panel:    Lab Results   Component Value Date    ALKPHOS 64 01/24/2018    ALT 18 01/24/2018    AST 19 01/24/2018    PROT 6.2 01/24/2018    BILITOT 0.4 01/24/2018    BILIDIR <0.2 01/07/2018    LABALBU 3.0 01/24/2018     Magnesium:    Lab Results   Component Value Date    MG 2.1 12/22/2017     PT/INR:    Lab Results   Component Value Date    INR 0.98 12/29/2017     Lipids:    Lab Results   Component Value Date    TRIG 142 07/17/2015    HDL 34 07/17/2015    LDLCALC 84 07/17/2015       ASSESSMENT AND PLAN:   The patient's condition/symptoms are Stable: No clinical evidence of fluid overload today. Continue current medical regimen without changes at present time.     1. CHF (congestive heart failure), NYHA class I, chronic, combined (HCC)  Basic Metabolic Panel       Continue:  · Continue current medications - decrease K Dur to once a day. Continue Entresto, Hydralazine and Lasix. Monitor BMP. · Daily weights  · Fluid restriction of 2 Liters per day  · Limit sodium in diet to around 1500 mg/day  · Monitor BP  · Activity as tolerated     Patient was instructed to call the [facility] for changes in the following symptoms:   Weight gain of 2-3 pounds in 1 day or 5 pounds in 1 week  Increased shortness of breath  Shortness of breath while laying down  Cough  Chest pain  Swelling in feet, ankles or legs  Tenderness or bloating in the abdomen  Fatigue   Decreased appetite or nausea   Confusion      Return in about 3 months (around 4/30/2018). or sooner if needed     Patient given educational materials - see patient instructions. We discussed the importance of weighing oneself and recording daily. We also discussed the importance of a low sodium diet, higher sodium foods to avoid and better low sodium food options. Patient verbalizes understanding of plan of care using teach back method, and is agreeable to the treatment plan. Electronically signed by Susi Terrell CNP on 1/31/2018 at 2:53 PM

## 2018-01-31 NOTE — TELEPHONE ENCOUNTER
Received call from Dr. Ya Smith office that patient is to resume Coumadin. Referral faxed.   Forwarded to pharmacist.

## 2018-01-31 NOTE — PROGRESS NOTES
DR Toña Jesus PT  [employer] DUAL ICD CHECK IN OFFICE [place]  BATTERY 8.1 YRS REMAINING  A PACED 0%  V PACED 0%  P WAVES 1.3  RV WAVES 9.4  ATRIAL THRESHOLD 0.75 @ 0.40  RV THRESHOLD 0.5 @ 0.40  ATRIAL IMPEDENCE 399  RV IMPEDENCE 323  RV DEFIB 44  OPTIVOL WNL    ATRIAL AMPLITUDE CHANGED FROM 2.50 TO 2.25

## 2018-01-31 NOTE — TELEPHONE ENCOUNTER
Patient would like refill sent to [facility] and 30 day supply sent to [facility] because he only has a few pill left. Please advise.

## 2018-02-05 ENCOUNTER — OFFICE VISIT (OUTPATIENT)
Dept: FAMILY MEDICINE CLINIC | Age: 53
End: 2018-02-05
Payer: COMMERCIAL

## 2018-02-05 ENCOUNTER — HOSPITAL ENCOUNTER (OUTPATIENT)
Dept: PHARMACY | Age: 53
Setting detail: THERAPIES SERIES
Discharge: HOME | End: 2018-02-05
Payer: COMMERCIAL

## 2018-02-05 VITALS
BODY MASS INDEX: 34.36 KG/M2 | SYSTOLIC BLOOD PRESSURE: 146 MMHG | RESPIRATION RATE: 14 BRPM | WEIGHT: 267.7 LBS | DIASTOLIC BLOOD PRESSURE: 78 MMHG | HEIGHT: 74 IN | OXYGEN SATURATION: 98 % | HEART RATE: 54 BPM

## 2018-02-05 DIAGNOSIS — I50.22 CHRONIC SYSTOLIC CHF (CONGESTIVE HEART FAILURE) (HCC): ICD-10-CM

## 2018-02-05 DIAGNOSIS — D49.7 INTRADURAL EXTRAMEDULLARY SPINAL TUMOR: ICD-10-CM

## 2018-02-05 DIAGNOSIS — I10 ESSENTIAL HYPERTENSION: ICD-10-CM

## 2018-02-05 DIAGNOSIS — F41.9 ANXIETY: ICD-10-CM

## 2018-02-05 DIAGNOSIS — I48.0 PAROXYSMAL ATRIAL FIBRILLATION (HCC): ICD-10-CM

## 2018-02-05 DIAGNOSIS — S39.012A STRAIN OF LUMBAR REGION, INITIAL ENCOUNTER: ICD-10-CM

## 2018-02-05 DIAGNOSIS — Z95.810 PRESENCE OF COMBINATION INTERNAL CARDIAC DEFIBRILLATOR (ICD) AND PACEMAKER: ICD-10-CM

## 2018-02-05 DIAGNOSIS — F17.200 SMOKING: ICD-10-CM

## 2018-02-05 DIAGNOSIS — E11.22 CONTROLLED TYPE 2 DIABETES MELLITUS WITH CHRONIC KIDNEY DISEASE, WITHOUT LONG-TERM CURRENT USE OF INSULIN, UNSPECIFIED CKD STAGE (HCC): Primary | ICD-10-CM

## 2018-02-05 DIAGNOSIS — N18.30 CKD (CHRONIC KIDNEY DISEASE), STAGE III (HCC): ICD-10-CM

## 2018-02-05 LAB — POC INR: 2.6 (ref 0.8–1.2)

## 2018-02-05 PROCEDURE — 99211 OFF/OP EST MAY X REQ PHY/QHP: CPT | Performed by: PHARMACIST

## 2018-02-05 PROCEDURE — 85610 PROTHROMBIN TIME: CPT | Performed by: PHARMACIST

## 2018-02-05 PROCEDURE — 99214 OFFICE O/P EST MOD 30 MIN: CPT | Performed by: NURSE PRACTITIONER

## 2018-02-05 PROCEDURE — 36416 COLLJ CAPILLARY BLOOD SPEC: CPT | Performed by: PHARMACIST

## 2018-02-05 RX ORDER — BACLOFEN 20 MG/1
20 TABLET ORAL 3 TIMES DAILY
Qty: 180 TABLET | Refills: 3 | Status: SHIPPED | OUTPATIENT
Start: 2018-02-05 | End: 2018-12-14

## 2018-02-05 RX ORDER — HYDRALAZINE HYDROCHLORIDE 50 MG/1
50 TABLET, FILM COATED ORAL EVERY 8 HOURS SCHEDULED
Qty: 240 TABLET | Refills: 2 | Status: SHIPPED | OUTPATIENT
Start: 2018-02-05 | End: 2018-10-15 | Stop reason: SDUPTHER

## 2018-02-05 RX ORDER — ALPRAZOLAM 0.25 MG/1
0.25 TABLET ORAL NIGHTLY PRN
Qty: 30 TABLET | Refills: 2 | Status: SHIPPED | OUTPATIENT
Start: 2018-02-05 | End: 2018-03-01 | Stop reason: ALTCHOICE

## 2018-02-05 RX ORDER — NICOTINE 21 MG/24HR
1 PATCH, TRANSDERMAL 24 HOURS TRANSDERMAL EVERY 24 HOURS
Qty: 30 PATCH | Refills: 3 | Status: SHIPPED | OUTPATIENT
Start: 2018-02-05 | End: 2018-04-23 | Stop reason: ALTCHOICE

## 2018-02-05 RX ORDER — WARFARIN SODIUM 5 MG/1
TABLET ORAL EVERY EVENING
COMMUNITY
End: 2018-07-17 | Stop reason: SDDI

## 2018-02-05 RX ORDER — AMIODARONE HYDROCHLORIDE 200 MG/1
TABLET ORAL
Qty: 180 TABLET | Refills: 3 | Status: SHIPPED | OUTPATIENT
Start: 2018-02-05 | End: 2019-03-24 | Stop reason: SDUPTHER

## 2018-02-05 NOTE — PROGRESS NOTES
Date/Time     01/24/2018 0654    K 5.1 01/24/2018 0654     01/24/2018 0654    CO2 24 01/24/2018 0654    BUN 19 01/24/2018 0654    CREATININE 1.3 (H) 01/24/2018 0654        Component Value Date/Time    CALCIUM 9.0 01/24/2018 0654    ALKPHOS 64 01/24/2018 0654    AST 19 01/24/2018 0654    ALT 18 01/24/2018 0654    BILITOT 0.4 01/24/2018 0654            Lab Results   Component Value Date    TSH 2.690 01/04/2017       Lab Results   Component Value Date    WBC 12.7 (H) 01/24/2018    HGB 10.8 (L) 01/24/2018    HCT 32.1 (L) 01/24/2018    MCV 98.8 (H) 01/24/2018     01/24/2018         Health Maintenance   Topic Date Due    Pneumococcal highest risk (1 of 3 - PCV13) 05/20/1984    Colon cancer screen colonoscopy  05/20/2015    Lipid screen  07/17/2016    Diabetic microalbuminuria test  12/15/2016    Diabetic retinal exam  12/15/2016    TSH testing  01/04/2018    Flu vaccine (1) 01/04/2019 (Originally 9/1/2017)    Diabetic foot exam  07/07/2018    A1C test (Diabetic or Prediabetic)  01/23/2019    Potassium monitoring  01/24/2019    Creatinine monitoring  01/24/2019    DTaP/Tdap/Td vaccine (2 - Td) 06/25/2027    Hepatitis C screen  Addressed    HIV screen  Addressed       Immunization History   Administered Date(s) Administered    Tdap (Boostrix, Adacel) 06/25/2017         Review of Systems   Constitutional: Positive for fatigue. Negative for chills and fever. HENT: Negative. Respiratory: Negative for cough and shortness of breath. Cardiovascular: Negative for chest pain. Gastrointestinal: Negative for abdominal pain and nausea. Musculoskeletal: Positive for arthralgias and back pain. Skin: Negative for rash. Neurological: Negative for dizziness, light-headedness and headaches. Psychiatric/Behavioral: Positive for dysphoric mood and sleep disturbance. The patient is nervous/anxious. Objective:   Physical Exam   Constitutional: He is oriented to person, place, and time.

## 2018-02-06 DIAGNOSIS — D49.2 LUMBAR SPINE TUMOR: Primary | ICD-10-CM

## 2018-02-06 DIAGNOSIS — Z98.890 STATUS POST LUMBAR SURGERY: ICD-10-CM

## 2018-02-06 ASSESSMENT — ENCOUNTER SYMPTOMS
ABDOMINAL PAIN: 0
COUGH: 0
NAUSEA: 0
SHORTNESS OF BREATH: 0
BACK PAIN: 1

## 2018-02-07 ENCOUNTER — TELEPHONE (OUTPATIENT)
Dept: FAMILY MEDICINE CLINIC | Age: 53
End: 2018-02-07

## 2018-02-07 NOTE — TELEPHONE ENCOUNTER
Notify patient he has to take this dose 1 tab nightly for 30 days since he picked up the rx. Will increase at next refill date.

## 2018-02-07 NOTE — TELEPHONE ENCOUNTER
I phoned [address] [facility] [phone], spoke with [address] who says pt already picked this rx up yesterday. She did cancel all the remaining refills of the Xanax 0.25mg. Please advise.

## 2018-02-13 ENCOUNTER — TELEPHONE (OUTPATIENT)
Dept: NEUROSURGERY | Age: 53
End: 2018-02-13

## 2018-02-14 ENCOUNTER — HOSPITAL ENCOUNTER (INPATIENT)
Age: 53
LOS: 3 days | Discharge: HOME | DRG: 251 | End: 2018-02-17
Attending: FAMILY MEDICINE | Admitting: INTERNAL MEDICINE
Payer: COMMERCIAL

## 2018-02-14 ENCOUNTER — APPOINTMENT (OUTPATIENT)
Dept: GENERAL RADIOLOGY | Age: 53
DRG: 251 | End: 2018-02-14
Payer: COMMERCIAL

## 2018-02-14 DIAGNOSIS — I21.4 NSTEMI (NON-ST ELEVATED MYOCARDIAL INFARCTION) (HCC): Primary | ICD-10-CM

## 2018-02-14 PROBLEM — R07.9 CHEST PAIN: Status: ACTIVE | Noted: 2018-02-14

## 2018-02-14 LAB
ALBUMIN SERPL-MCNC: 3.1 G/DL (ref 3.5–5.1)
ALP BLD-CCNC: 79 U/L (ref 38–126)
ALT SERPL-CCNC: 17 U/L (ref 11–66)
AMORPHOUS: ABNORMAL
AMPHETAMINE+METHAMPHETAMINE URINE SCREEN: POSITIVE
ANION GAP SERPL CALCULATED.3IONS-SCNC: 15 MEQ/L (ref 8–16)
ANISOCYTOSIS: ABNORMAL
APTT: 45.1 SECONDS (ref 22–38)
AST SERPL-CCNC: 16 U/L (ref 5–40)
BACTERIA: ABNORMAL /HPF
BARBITURATE QUANTITATIVE URINE: NEGATIVE
BASOPHILS # BLD: 0.5 %
BASOPHILS ABSOLUTE: 0.1 THOU/MM3 (ref 0–0.1)
BENZODIAZEPINE QUANTITATIVE URINE: POSITIVE
BILIRUB SERPL-MCNC: 0.4 MG/DL (ref 0.3–1.2)
BILIRUBIN DIRECT: < 0.2 MG/DL (ref 0–0.3)
BILIRUBIN URINE: NEGATIVE
BLOOD, URINE: ABNORMAL
BUN BLDV-MCNC: 14 MG/DL (ref 7–22)
CALCIUM SERPL-MCNC: 8.8 MG/DL (ref 8.5–10.5)
CANNABINOID QUANTITATIVE URINE: NEGATIVE
CASTS 2: ABNORMAL /LPF
CASTS UA: ABNORMAL /LPF
CHARACTER, URINE: ABNORMAL
CHLORIDE BLD-SCNC: 102 MEQ/L (ref 98–111)
CO2: 21 MEQ/L (ref 23–33)
COCAINE METABOLITE QUANTITATIVE URINE: NEGATIVE
COLOR: YELLOW
CREAT SERPL-MCNC: 1.1 MG/DL (ref 0.4–1.2)
CRYSTALS, UA: ABNORMAL
EKG ATRIAL RATE: 81 BPM
EKG P AXIS: 33 DEGREES
EKG P-R INTERVAL: 188 MS
EKG Q-T INTERVAL: 380 MS
EKG QRS DURATION: 90 MS
EKG QTC CALCULATION (BAZETT): 441 MS
EKG R AXIS: -5 DEGREES
EKG T AXIS: 107 DEGREES
EKG VENTRICULAR RATE: 81 BPM
EOSINOPHIL # BLD: 0.3 %
EOSINOPHILS ABSOLUTE: 0 THOU/MM3 (ref 0–0.4)
EPITHELIAL CELLS, UA: ABNORMAL /HPF
ETHYL ALCOHOL, SERUM: < 0.01 %
FLU A ANTIGEN: NEGATIVE
FLU B ANTIGEN: NEGATIVE
GFR SERPL CREATININE-BSD FRML MDRD: 70 ML/MIN/1.73M2
GLUCOSE BLD-MCNC: 211 MG/DL (ref 70–108)
GLUCOSE BLD-MCNC: 214 MG/DL (ref 70–108)
GLUCOSE BLD-MCNC: 252 MG/DL (ref 70–108)
GLUCOSE URINE: 500 MG/DL
HCT VFR BLD CALC: 35.7 % (ref 42–52)
HEMOGLOBIN: 12.1 GM/DL (ref 14–18)
INR BLD: 2.59 (ref 0.85–1.13)
KETONES, URINE: NEGATIVE
LEUKOCYTE ESTERASE, URINE: ABNORMAL
LIPASE: 40.3 U/L (ref 5.6–51.3)
LYMPHOCYTES # BLD: 2 %
LYMPHOCYTES ABSOLUTE: 0.3 THOU/MM3 (ref 1–4.8)
MAGNESIUM: 2 MG/DL (ref 1.6–2.4)
MCH RBC QN AUTO: 33.2 PG (ref 27–31)
MCHC RBC AUTO-ENTMCNC: 33.9 GM/DL (ref 33–37)
MCV RBC AUTO: 98 FL (ref 80–94)
MISCELLANEOUS 2: ABNORMAL
MONOCYTES # BLD: 6.2 %
MONOCYTES ABSOLUTE: 1 THOU/MM3 (ref 0.4–1.3)
MUCUS: ABNORMAL
NITRITE, URINE: POSITIVE
NUCLEATED RED BLOOD CELLS: 0 /100 WBC
OPIATES, URINE: NEGATIVE
OSMOLALITY CALCULATION: 282.4 MOSMOL/KG (ref 275–300)
OXYCODONE: NEGATIVE
PDW BLD-RTO: 16.7 % (ref 11.5–14.5)
PH UA: 6
PHENCYCLIDINE QUANTITATIVE URINE: NEGATIVE
PLATELET # BLD: 186 THOU/MM3 (ref 130–400)
PMV BLD AUTO: 8.9 FL (ref 7.4–10.4)
POTASSIUM SERPL-SCNC: 4.3 MEQ/L (ref 3.5–5.2)
PRO-BNP: 1847 PG/ML (ref 0–900)
PROTEIN UA: 300
RBC # BLD: 3.65 MILL/MM3 (ref 4.7–6.1)
RBC URINE: ABNORMAL /HPF
RENAL EPITHELIAL, UA: ABNORMAL
SEG NEUTROPHILS: 91 %
SEGMENTED NEUTROPHILS ABSOLUTE COUNT: 15 THOU/MM3 (ref 1.8–7.7)
SODIUM BLD-SCNC: 138 MEQ/L (ref 135–145)
SPECIFIC GRAVITY, URINE: 1.03 (ref 1–1.03)
TOTAL PROTEIN: 5.8 G/DL (ref 6.1–8)
TROPONIN T: 0.03 NG/ML
TROPONIN T: 0.04 NG/ML
UROBILINOGEN, URINE: 0.2 EU/DL
WBC # BLD: 16.5 THOU/MM3 (ref 4.8–10.8)
WBC UA: > 100 /HPF
YEAST: ABNORMAL

## 2018-02-14 PROCEDURE — G0480 DRUG TEST DEF 1-7 CLASSES: HCPCS

## 2018-02-14 PROCEDURE — 85730 THROMBOPLASTIN TIME PARTIAL: CPT

## 2018-02-14 PROCEDURE — 84484 ASSAY OF TROPONIN QUANT: CPT

## 2018-02-14 PROCEDURE — 71046 X-RAY EXAM CHEST 2 VIEWS: CPT

## 2018-02-14 PROCEDURE — 99285 EMERGENCY DEPT VISIT HI MDM: CPT

## 2018-02-14 PROCEDURE — 83880 ASSAY OF NATRIURETIC PEPTIDE: CPT

## 2018-02-14 PROCEDURE — 87077 CULTURE AEROBIC IDENTIFY: CPT

## 2018-02-14 PROCEDURE — 99223 1ST HOSP IP/OBS HIGH 75: CPT | Performed by: INTERNAL MEDICINE

## 2018-02-14 PROCEDURE — 87184 SC STD DISK METHOD PER PLATE: CPT

## 2018-02-14 PROCEDURE — 85025 COMPLETE CBC W/AUTO DIFF WBC: CPT

## 2018-02-14 PROCEDURE — 6370000000 HC RX 637 (ALT 250 FOR IP): Performed by: FAMILY MEDICINE

## 2018-02-14 PROCEDURE — 83735 ASSAY OF MAGNESIUM: CPT

## 2018-02-14 PROCEDURE — 87086 URINE CULTURE/COLONY COUNT: CPT

## 2018-02-14 PROCEDURE — 02703ZZ DILATION OF CORONARY ARTERY, ONE ARTERY, PERCUTANEOUS APPROACH: ICD-10-PCS | Performed by: INTERNAL MEDICINE

## 2018-02-14 PROCEDURE — G0378 HOSPITAL OBSERVATION PER HR: HCPCS

## 2018-02-14 PROCEDURE — 87804 INFLUENZA ASSAY W/OPTIC: CPT

## 2018-02-14 PROCEDURE — 87186 SC STD MICRODIL/AGAR DIL: CPT

## 2018-02-14 PROCEDURE — 2580000003 HC RX 258: Performed by: EMERGENCY MEDICINE

## 2018-02-14 PROCEDURE — B2111ZZ FLUOROSCOPY OF MULTIPLE CORONARY ARTERIES USING LOW OSMOLAR CONTRAST: ICD-10-PCS | Performed by: INTERNAL MEDICINE

## 2018-02-14 PROCEDURE — 82248 BILIRUBIN DIRECT: CPT

## 2018-02-14 PROCEDURE — 2140000000 HC CCU INTERMEDIATE R&B

## 2018-02-14 PROCEDURE — 2580000003 HC RX 258: Performed by: INTERNAL MEDICINE

## 2018-02-14 PROCEDURE — 80053 COMPREHEN METABOLIC PANEL: CPT

## 2018-02-14 PROCEDURE — 82948 REAGENT STRIP/BLOOD GLUCOSE: CPT

## 2018-02-14 PROCEDURE — 83690 ASSAY OF LIPASE: CPT

## 2018-02-14 PROCEDURE — 93010 ELECTROCARDIOGRAM REPORT: CPT | Performed by: INTERNAL MEDICINE

## 2018-02-14 PROCEDURE — B2151ZZ FLUOROSCOPY OF LEFT HEART USING LOW OSMOLAR CONTRAST: ICD-10-PCS | Performed by: INTERNAL MEDICINE

## 2018-02-14 PROCEDURE — 36415 COLL VENOUS BLD VENIPUNCTURE: CPT

## 2018-02-14 PROCEDURE — 93005 ELECTROCARDIOGRAM TRACING: CPT | Performed by: EMERGENCY MEDICINE

## 2018-02-14 PROCEDURE — 81001 URINALYSIS AUTO W/SCOPE: CPT

## 2018-02-14 PROCEDURE — 6370000000 HC RX 637 (ALT 250 FOR IP): Performed by: INTERNAL MEDICINE

## 2018-02-14 PROCEDURE — 85610 PROTHROMBIN TIME: CPT

## 2018-02-14 PROCEDURE — 80307 DRUG TEST PRSMV CHEM ANLYZR: CPT

## 2018-02-14 RX ORDER — BACLOFEN 10 MG/1
20 TABLET ORAL 3 TIMES DAILY
Status: DISCONTINUED | OUTPATIENT
Start: 2018-02-14 | End: 2018-02-17 | Stop reason: HOSPADM

## 2018-02-14 RX ORDER — METOPROLOL TARTRATE 50 MG/1
50 TABLET, FILM COATED ORAL EVERY 8 HOURS
Status: DISCONTINUED | OUTPATIENT
Start: 2018-02-14 | End: 2018-02-17 | Stop reason: HOSPADM

## 2018-02-14 RX ORDER — MEXILETINE HYDROCHLORIDE 150 MG/1
300 CAPSULE ORAL EVERY 8 HOURS SCHEDULED
Status: DISCONTINUED | OUTPATIENT
Start: 2018-02-14 | End: 2018-02-14

## 2018-02-14 RX ORDER — SODIUM CHLORIDE 0.9 % (FLUSH) 0.9 %
10 SYRINGE (ML) INJECTION PRN
Status: DISCONTINUED | OUTPATIENT
Start: 2018-02-14 | End: 2018-02-16 | Stop reason: SDUPTHER

## 2018-02-14 RX ORDER — WARFARIN SODIUM 5 MG/1
2.5 TABLET ORAL
Status: DISCONTINUED | OUTPATIENT
Start: 2018-02-14 | End: 2018-02-15

## 2018-02-14 RX ORDER — SODIUM CHLORIDE 0.9 % (FLUSH) 0.9 %
10 SYRINGE (ML) INJECTION EVERY 12 HOURS SCHEDULED
Status: DISCONTINUED | OUTPATIENT
Start: 2018-02-14 | End: 2018-02-16 | Stop reason: SDUPTHER

## 2018-02-14 RX ORDER — TAMSULOSIN HYDROCHLORIDE 0.4 MG/1
0.4 CAPSULE ORAL DAILY
Status: DISCONTINUED | OUTPATIENT
Start: 2018-02-15 | End: 2018-02-17 | Stop reason: HOSPADM

## 2018-02-14 RX ORDER — DEXTROSE MONOHYDRATE 25 G/50ML
12.5 INJECTION, SOLUTION INTRAVENOUS PRN
Status: DISCONTINUED | OUTPATIENT
Start: 2018-02-14 | End: 2018-02-17 | Stop reason: HOSPADM

## 2018-02-14 RX ORDER — NICOTINE POLACRILEX 4 MG
15 LOZENGE BUCCAL PRN
Status: DISCONTINUED | OUTPATIENT
Start: 2018-02-14 | End: 2018-02-17 | Stop reason: HOSPADM

## 2018-02-14 RX ORDER — TAMSULOSIN HYDROCHLORIDE 0.4 MG/1
0.4 CAPSULE ORAL DAILY
Status: DISCONTINUED | OUTPATIENT
Start: 2018-02-14 | End: 2018-02-14

## 2018-02-14 RX ORDER — POTASSIUM CHLORIDE 20 MEQ/1
20 TABLET, EXTENDED RELEASE ORAL
Status: DISCONTINUED | OUTPATIENT
Start: 2018-02-15 | End: 2018-02-17 | Stop reason: HOSPADM

## 2018-02-14 RX ORDER — HYDRALAZINE HYDROCHLORIDE 50 MG/1
50 TABLET, FILM COATED ORAL EVERY 8 HOURS SCHEDULED
Status: DISCONTINUED | OUTPATIENT
Start: 2018-02-14 | End: 2018-02-14

## 2018-02-14 RX ORDER — FUROSEMIDE 40 MG/1
40 TABLET ORAL DAILY
Status: DISCONTINUED | OUTPATIENT
Start: 2018-02-14 | End: 2018-02-14

## 2018-02-14 RX ORDER — DEXTROSE MONOHYDRATE 50 MG/ML
100 INJECTION, SOLUTION INTRAVENOUS PRN
Status: DISCONTINUED | OUTPATIENT
Start: 2018-02-14 | End: 2018-02-14 | Stop reason: RX

## 2018-02-14 RX ORDER — MORPHINE SULFATE 2 MG/ML
2 INJECTION, SOLUTION INTRAMUSCULAR; INTRAVENOUS
Status: DISCONTINUED | OUTPATIENT
Start: 2018-02-14 | End: 2018-02-17 | Stop reason: HOSPADM

## 2018-02-14 RX ORDER — SIMVASTATIN 20 MG
20 TABLET ORAL NIGHTLY
Status: DISCONTINUED | OUTPATIENT
Start: 2018-02-14 | End: 2018-02-17 | Stop reason: HOSPADM

## 2018-02-14 RX ORDER — WARFARIN SODIUM 2.5 MG/1
2.5 TABLET ORAL
Status: DISCONTINUED | OUTPATIENT
Start: 2018-02-14 | End: 2018-02-14

## 2018-02-14 RX ORDER — ASPIRIN 81 MG/1
81 TABLET, CHEWABLE ORAL DAILY
Status: DISCONTINUED | OUTPATIENT
Start: 2018-02-15 | End: 2018-02-16 | Stop reason: ALTCHOICE

## 2018-02-14 RX ORDER — ASPIRIN 81 MG/1
324 TABLET, CHEWABLE ORAL ONCE
Status: COMPLETED | OUTPATIENT
Start: 2018-02-14 | End: 2018-02-14

## 2018-02-14 RX ORDER — HYDRALAZINE HYDROCHLORIDE 25 MG/1
50 TABLET, FILM COATED ORAL EVERY 8 HOURS SCHEDULED
Status: DISCONTINUED | OUTPATIENT
Start: 2018-02-14 | End: 2018-02-17 | Stop reason: HOSPADM

## 2018-02-14 RX ORDER — FUROSEMIDE 40 MG/1
40 TABLET ORAL DAILY
Status: DISCONTINUED | OUTPATIENT
Start: 2018-02-15 | End: 2018-02-17 | Stop reason: HOSPADM

## 2018-02-14 RX ORDER — ONDANSETRON 2 MG/ML
4 INJECTION INTRAMUSCULAR; INTRAVENOUS EVERY 6 HOURS PRN
Status: DISCONTINUED | OUTPATIENT
Start: 2018-02-14 | End: 2018-02-17 | Stop reason: HOSPADM

## 2018-02-14 RX ORDER — MEXILETINE HYDROCHLORIDE 150 MG/1
300 CAPSULE ORAL EVERY 8 HOURS SCHEDULED
Status: DISCONTINUED | OUTPATIENT
Start: 2018-02-14 | End: 2018-02-17 | Stop reason: HOSPADM

## 2018-02-14 RX ORDER — 0.9 % SODIUM CHLORIDE 0.9 %
1000 INTRAVENOUS SOLUTION INTRAVENOUS ONCE
Status: DISCONTINUED | OUTPATIENT
Start: 2018-02-14 | End: 2018-02-14

## 2018-02-14 RX ORDER — FINASTERIDE 5 MG/1
5 TABLET, FILM COATED ORAL DAILY
Status: DISCONTINUED | OUTPATIENT
Start: 2018-02-14 | End: 2018-02-17 | Stop reason: HOSPADM

## 2018-02-14 RX ORDER — SIMVASTATIN 20 MG
20 TABLET ORAL NIGHTLY
Status: DISCONTINUED | OUTPATIENT
Start: 2018-02-14 | End: 2018-02-14

## 2018-02-14 RX ORDER — POTASSIUM CHLORIDE 20 MEQ/1
20 TABLET, EXTENDED RELEASE ORAL
Status: DISCONTINUED | OUTPATIENT
Start: 2018-02-15 | End: 2018-02-14

## 2018-02-14 RX ORDER — ALPRAZOLAM 0.25 MG/1
0.25 TABLET ORAL NIGHTLY PRN
Status: DISCONTINUED | OUTPATIENT
Start: 2018-02-14 | End: 2018-02-17 | Stop reason: HOSPADM

## 2018-02-14 RX ORDER — DEXTROSE AND SODIUM CHLORIDE 5; .9 G/100ML; G/100ML
INJECTION, SOLUTION INTRAVENOUS PRN
Status: DISCONTINUED | OUTPATIENT
Start: 2018-02-14 | End: 2018-02-17 | Stop reason: HOSPADM

## 2018-02-14 RX ORDER — AMIODARONE HYDROCHLORIDE 200 MG/1
200 TABLET ORAL 2 TIMES DAILY
Status: DISCONTINUED | OUTPATIENT
Start: 2018-02-14 | End: 2018-02-14

## 2018-02-14 RX ORDER — NITROFURANTOIN 25; 75 MG/1; MG/1
100 CAPSULE ORAL EVERY 12 HOURS SCHEDULED
Status: DISCONTINUED | OUTPATIENT
Start: 2018-02-14 | End: 2018-02-17 | Stop reason: HOSPADM

## 2018-02-14 RX ORDER — METOPROLOL TARTRATE 50 MG/1
50 TABLET, FILM COATED ORAL EVERY 8 HOURS
Status: DISCONTINUED | OUTPATIENT
Start: 2018-02-14 | End: 2018-02-14

## 2018-02-14 RX ORDER — AMIODARONE HYDROCHLORIDE 200 MG/1
200 TABLET ORAL 2 TIMES DAILY
Status: DISCONTINUED | OUTPATIENT
Start: 2018-02-14 | End: 2018-02-17 | Stop reason: HOSPADM

## 2018-02-14 RX ORDER — ACETAMINOPHEN 325 MG/1
650 TABLET ORAL EVERY 4 HOURS PRN
Status: DISCONTINUED | OUTPATIENT
Start: 2018-02-14 | End: 2018-02-16 | Stop reason: SDUPTHER

## 2018-02-14 RX ORDER — NICOTINE 21 MG/24HR
1 PATCH, TRANSDERMAL 24 HOURS TRANSDERMAL EVERY 24 HOURS
Status: DISCONTINUED | OUTPATIENT
Start: 2018-02-14 | End: 2018-02-17 | Stop reason: HOSPADM

## 2018-02-14 RX ADMIN — SACUBITRIL AND VALSARTAN 1 TABLET: 49; 51 TABLET, FILM COATED ORAL at 12:23

## 2018-02-14 RX ADMIN — Medication 20 MG: at 22:22

## 2018-02-14 RX ADMIN — ACETAMINOPHEN 650 MG: 325 TABLET ORAL at 22:23

## 2018-02-14 RX ADMIN — NITROFURANTOIN MONOHYDRATE/MACROCRYSTALLINE 100 MG: 25; 75 CAPSULE ORAL at 22:28

## 2018-02-14 RX ADMIN — Medication 10 ML: at 22:23

## 2018-02-14 RX ADMIN — FUROSEMIDE 40 MG: 40 TABLET ORAL at 12:21

## 2018-02-14 RX ADMIN — FINASTERIDE 5 MG: 5 TABLET, FILM COATED ORAL at 12:19

## 2018-02-14 RX ADMIN — TAMSULOSIN HYDROCHLORIDE 0.4 MG: 0.4 CAPSULE ORAL at 12:23

## 2018-02-14 RX ADMIN — HYDRALAZINE HYDROCHLORIDE 50 MG: 25 TABLET, FILM COATED ORAL at 14:38

## 2018-02-14 RX ADMIN — BACLOFEN 20 MG: 10 TABLET ORAL at 22:24

## 2018-02-14 RX ADMIN — INSULIN LISPRO 1 UNITS: 100 INJECTION, SOLUTION INTRAVENOUS; SUBCUTANEOUS at 22:17

## 2018-02-14 RX ADMIN — MEXILETINE HYDROCHLORIDE 300 MG: 150 CAPSULE ORAL at 14:39

## 2018-02-14 RX ADMIN — ALPRAZOLAM 0.25 MG: 0.25 TABLET ORAL at 22:28

## 2018-02-14 RX ADMIN — AMIODARONE HYDROCHLORIDE 200 MG: 200 TABLET ORAL at 22:19

## 2018-02-14 RX ADMIN — Medication 30 ML: at 06:45

## 2018-02-14 RX ADMIN — BACLOFEN 20 MG: 10 TABLET ORAL at 16:50

## 2018-02-14 RX ADMIN — ASPIRIN 81 MG 324 MG: 81 TABLET ORAL at 06:45

## 2018-02-14 RX ADMIN — ACETAMINOPHEN 650 MG: 325 TABLET ORAL at 12:28

## 2018-02-14 RX ADMIN — Medication 10 ML: at 12:24

## 2018-02-14 RX ADMIN — METOPROLOL TARTRATE 50 MG: 50 TABLET, FILM COATED ORAL at 12:22

## 2018-02-14 RX ADMIN — MEXILETINE HYDROCHLORIDE 300 MG: 150 CAPSULE ORAL at 22:21

## 2018-02-14 RX ADMIN — Medication 3 UNITS: at 16:50

## 2018-02-14 RX ADMIN — LINAGLIPTIN 5 MG: 5 TABLET, FILM COATED ORAL at 12:21

## 2018-02-14 RX ADMIN — AMIODARONE HYDROCHLORIDE 200 MG: 200 TABLET ORAL at 12:20

## 2018-02-14 RX ADMIN — HYDRALAZINE HYDROCHLORIDE 50 MG: 25 TABLET, FILM COATED ORAL at 22:20

## 2018-02-14 RX ADMIN — SODIUM CHLORIDE 1000 ML: 9 INJECTION, SOLUTION INTRAVENOUS at 05:47

## 2018-02-14 RX ADMIN — BACLOFEN 20 MG: 10 TABLET ORAL at 12:19

## 2018-02-14 ASSESSMENT — PAIN DESCRIPTION - DESCRIPTORS
DESCRIPTORS: ACHING

## 2018-02-14 ASSESSMENT — PAIN SCALES - GENERAL
PAINLEVEL_OUTOF10: 3
PAINLEVEL_OUTOF10: 4
PAINLEVEL_OUTOF10: 0
PAINLEVEL_OUTOF10: 0
PAINLEVEL_OUTOF10: 3
PAINLEVEL_OUTOF10: 5

## 2018-02-14 ASSESSMENT — PAIN DESCRIPTION - PAIN TYPE
TYPE: ACUTE PAIN

## 2018-02-14 ASSESSMENT — PAIN DESCRIPTION - PROGRESSION: CLINICAL_PROGRESSION: RESOLVED

## 2018-02-14 ASSESSMENT — PAIN DESCRIPTION - FREQUENCY: FREQUENCY: CONTINUOUS

## 2018-02-14 ASSESSMENT — HEART SCORE: ECG: 1

## 2018-02-14 ASSESSMENT — PAIN DESCRIPTION - LOCATION
LOCATION: CHEST
LOCATION: CHEST
LOCATION: LEG
LOCATION: LEG
LOCATION: HEAD

## 2018-02-14 ASSESSMENT — ENCOUNTER SYMPTOMS
CHEST TIGHTNESS: 1
DIARRHEA: 0
COUGH: 0
VOMITING: 0
STRIDOR: 0
NAUSEA: 0
WHEEZING: 0
BACK PAIN: 0
COLOR CHANGE: 0
SHORTNESS OF BREATH: 0
ABDOMINAL PAIN: 0

## 2018-02-14 ASSESSMENT — PAIN DESCRIPTION - ONSET: ONSET: ON-GOING

## 2018-02-14 NOTE — H&P
back pain    Delirium    Schwannoma of spinal cord (HCC)    CKD (chronic kidney disease), stage III    Non-traumatic rhabdomyolysis    History of heart bypass surgery    History of placement of internal cardiac defibrillator    Presence of combination internal cardiac defibrillator (ICD) and pacemaker    Metabolic encephalopathy    Accelerated hypertension    Hypernatremia    Metabolic acidosis    BABAK (acute kidney injury) (HCC)    Low grade fever    Leukocytosis    Abnormal EKG    Coagulopathy (HCC)    History of ventricular tachycardia    Polysubstance abuse    Physical deconditioning    Intradural extramedullary spinal tumor    Lumbar spine tumor    Acute neutrophilia    Status post lumbar surgery  few days ago    Pancreatic tumor  tail pancrease    Chest pain         Plan  1. Patient initially examined and evaluated.   - troponin T elevated at 0.026 and pro-BNP at 1847  - admit   - cardiac diet  - chewable aspirin, ekg, troponin q4h  - consult cardiology  - resume home medications  - pharm to dose warfarin      Elizabeth Ballard DPM, PGY-II  Foot and Ankle Surgical Resident  2/14/2018   10:11 AM

## 2018-02-14 NOTE — PROGRESS NOTES
Clinical Pharmacy Note    Angie Kirkpatrick is a 46 y.o. male for whom pharmacy has been asked to manage warfarin therapy. 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    Atrial fibrillation ([facility]     CAD (coronary artery disease) 3/25/2014    s/p CABG in may 2014    Cardiomyopathy ([facility]     Depression     Diabetes mellitus ([facility]     Drug abuse     hx of cacaine abuse, stopped abusing drugs in 2012    GERD (gastroesophageal reflux disease)     H/O cardiac catheterization 4/30/2014    Hyperlipidemia     Hypertension     Paroxysmal atrial fibrillation ([facility] 7/22/2014    V tach ([facility]     V-tach ([facility]     s/p ablation in dec 2014                Recent Labs      02/14/18   0617   INR  2.59*     Recent Labs      02/14/18   0538   HGB  12.1*   HCT  35.7*   PLT  186       Current warfarin drug-drug interactions: amiodarone      Date INR Warfarin Dose   2./14/18 2.59 2.5 mg                                   Daily PT/INR until stable within therapeutic range. Thank you for the consult.

## 2018-02-14 NOTE — ED PROVIDER NOTES
[facility]  eMERGENCY dEPARTMENT eNCOUnter          [address] [facility]       Chief Complaint   Patient presents with    Dizziness    Nausea    Chest Pain    Gastroesophageal Reflux       Nurses Notes reviewed and I agree except as noted in the HPI. HISTORY OF PRESENT ILLNESS    Feliz Gonzalez is a 46 y.o. male who presents to the Emergency Department for the evaluation of chest pains. Patient has a h/o CAD s/p CABG, chronic systolic HF s/p ICD placement (Echo from 12/2017 shows global hypokinesis, EF 40% and grade I diastolic dysfunction), cardiolite stress test WNL. He is also diabetic. Patient states that he overate last night and at around 4AM he woke up with substernal chest pains. They are intermittent and sharp in nature. He denies any SOB. He complains of some dizziness as well but denies LOC. He was nauseated. He was worried that this chest pain was related to his heart. He had a recent admission for hypoglycemia after recent lumbar spine tumor removal and laminectomy done by Dr. Brandon Jauregui (admit from 1/22/18-1/24/18). The HPI was provided by the patient. REVIEW OF SYSTEMS     Review of Systems   Constitutional: Negative for chills, diaphoresis, fatigue and fever. Respiratory: Positive for chest tightness. Negative for cough, shortness of breath, wheezing and stridor. Cardiovascular: Positive for chest pain. Negative for palpitations and leg swelling. Gastrointestinal: Negative for abdominal pain, diarrhea, nausea and vomiting. Musculoskeletal: Negative for back pain and neck pain. Skin: Negative for color change and pallor. Allergic/Immunologic: Negative for immunocompromised state. Neurological: Negative for dizziness, light-headedness, numbness and headaches. Hematological: Does not bruise/bleed easily. Psychiatric/Behavioral: Negative for confusion. All other systems reviewed and are negative.       PAST MEDICAL HISTORY    has a past medical history of Acute systolic CHF (congestive heart failure) ([facility] Alcohol abuse; Anomalous origin of right coronary artery; Atrial fibrillation ([facility] CAD (coronary artery disease); Cardiomyopathy ([facility] Depression; Diabetes mellitus ([facility] Drug abuse; H/O cardiac catheterization; Hyperlipidemia; Hypertension; Paroxysmal atrial fibrillation ([facility] V tach ([facility] and V-tach ([facility] SURGICAL HISTORY      has a past surgical history that includes Cardiac catheterization (3/20/14 ); Coronary artery bypass graft (5-9-14); other surgical history (Left, 10/21/14); EKG 12 Lead (7/17/2015); Cardiac defibrillator placement (10/13/2015); vascular surgery; pacemaker placement; and laminectomy,>2 sgmt,lumbar (N/A, 1/16/2018). CURRENT MEDICATIONS       Previous Medications    ACETAMINOPHEN 650 MG TABS    Take 650 mg by mouth every 4 hours as needed    ALPRAZOLAM (XANAX) 0.25 MG TABLET    Take 1 tablet by mouth nightly as needed for Sleep for up to 30 days.     AMIODARONE (CORDARONE) 200 MG TABLET    TAKE 1 TABLET BY MOUTH TWICE DAILY    BACLOFEN (LIORESAL) 20 MG TABLET    Take 1 tablet by mouth 3 times daily    FINASTERIDE (PROSCAR) 5 MG TABLET    Take 1 tab Daily    FUROSEMIDE (LASIX) 40 MG TABLET    TAKE 1 TABLET DAILY FOR TREATMENT WITH DIURETIC THERAPY    GLUCOSE BLOOD VI TEST STRIPS (PHIL CONTOUR TEST) STRIP    1 each by In Vitro route daily    HYDRALAZINE (APRESOLINE) 50 MG TABLET    Take 1 tablet by mouth every 8 hours    LINAGLIPTIN (TRADJENTA) 5 MG TABLET    Take 1 tablet by mouth daily    LINAGLIPTIN (TRADJENTA) 5 MG TABLET    Take 1 tablet by mouth daily    METFORMIN (GLUCOPHAGE) 1000 MG TABLET    Take 1 tablet by mouth 2 times daily (with meals)    METOPROLOL TARTRATE (LOPRESSOR) 50 MG TABLET    TAKE 1 TABLET THREE TIMES A DAY FOR ATRIAL FIBRILLATION    MEXILETINE (MEXITIL) 150 MG CAPSULE    TAKE 2 CAPSULES THREE TIMES A DAY FOR ATRIAL FIBRILLATION    NICOTINE (NICODERM CQ) 21 MG/24HR    Place 1 patch onto the skin every 24 CRITICAL CARE:        CONSULTS:  None    PROCEDURES:  None     FINAL IMPRESSION      1. NSTEMI (non-ST elevated myocardial infarction) [facility])          DISPOSITION/PLAN   Admit to hospitalist, cardiology to consult. PATIENT REFERRED TO:  No follow-up provider specified.     DISCHARGE MEDICATIONS:  New Prescriptions    No medications on file       (Please note that portions of this note were completed with a voice recognition program.  Efforts were made to edit the dictations but occasionally words are mis-transcribed.)    Candi Ponce MD 2/14/18 7:30 AM                            Candi Ponce MD  02/14/18 1538

## 2018-02-15 LAB
GLUCOSE BLD-MCNC: 148 MG/DL (ref 70–108)
GLUCOSE BLD-MCNC: 153 MG/DL (ref 70–108)
GLUCOSE BLD-MCNC: 224 MG/DL (ref 70–108)
GLUCOSE BLD-MCNC: 377 MG/DL (ref 70–108)
INR BLD: 2.2 (ref 0.85–1.13)
TROPONIN T: 0.03 NG/ML

## 2018-02-15 PROCEDURE — 85610 PROTHROMBIN TIME: CPT

## 2018-02-15 PROCEDURE — 99232 SBSQ HOSP IP/OBS MODERATE 35: CPT | Performed by: INTERNAL MEDICINE

## 2018-02-15 PROCEDURE — 6360000002 HC RX W HCPCS: Performed by: INTERNAL MEDICINE

## 2018-02-15 PROCEDURE — 36415 COLL VENOUS BLD VENIPUNCTURE: CPT

## 2018-02-15 PROCEDURE — 1200000003 HC TELEMETRY R&B

## 2018-02-15 PROCEDURE — 6370000000 HC RX 637 (ALT 250 FOR IP): Performed by: NURSE PRACTITIONER

## 2018-02-15 PROCEDURE — 6370000000 HC RX 637 (ALT 250 FOR IP): Performed by: INTERNAL MEDICINE

## 2018-02-15 PROCEDURE — 84484 ASSAY OF TROPONIN QUANT: CPT

## 2018-02-15 PROCEDURE — 2580000003 HC RX 258: Performed by: INTERNAL MEDICINE

## 2018-02-15 PROCEDURE — 82948 REAGENT STRIP/BLOOD GLUCOSE: CPT

## 2018-02-15 PROCEDURE — 99232 SBSQ HOSP IP/OBS MODERATE 35: CPT | Performed by: NURSE PRACTITIONER

## 2018-02-15 RX ORDER — PHYTONADIONE 5 MG/1
2.5 TABLET ORAL ONCE
Status: COMPLETED | OUTPATIENT
Start: 2018-02-15 | End: 2018-02-15

## 2018-02-15 RX ORDER — ISOSORBIDE MONONITRATE 30 MG/1
30 TABLET, EXTENDED RELEASE ORAL DAILY
Status: DISCONTINUED | OUTPATIENT
Start: 2018-02-15 | End: 2018-02-17 | Stop reason: HOSPADM

## 2018-02-15 RX ADMIN — MEXILETINE HYDROCHLORIDE 300 MG: 150 CAPSULE ORAL at 21:45

## 2018-02-15 RX ADMIN — ASPIRIN 81 MG: 81 TABLET, CHEWABLE ORAL at 09:19

## 2018-02-15 RX ADMIN — HYDRALAZINE HYDROCHLORIDE 50 MG: 25 TABLET, FILM COATED ORAL at 07:36

## 2018-02-15 RX ADMIN — FUROSEMIDE 40 MG: 40 TABLET ORAL at 09:16

## 2018-02-15 RX ADMIN — PHYTONADIONE 2.5 MG: 5 TABLET ORAL at 09:20

## 2018-02-15 RX ADMIN — METOPROLOL TARTRATE 50 MG: 50 TABLET, FILM COATED ORAL at 03:45

## 2018-02-15 RX ADMIN — Medication 10 ML: at 09:20

## 2018-02-15 RX ADMIN — FINASTERIDE 5 MG: 5 TABLET, FILM COATED ORAL at 09:19

## 2018-02-15 RX ADMIN — NITROFURANTOIN MONOHYDRATE/MACROCRYSTALLINE 100 MG: 25; 75 CAPSULE ORAL at 20:13

## 2018-02-15 RX ADMIN — Medication 20 MG: at 20:13

## 2018-02-15 RX ADMIN — ENOXAPARIN SODIUM 120 MG: 120 INJECTION SUBCUTANEOUS at 20:11

## 2018-02-15 RX ADMIN — NITROFURANTOIN MONOHYDRATE/MACROCRYSTALLINE 100 MG: 25; 75 CAPSULE ORAL at 09:19

## 2018-02-15 RX ADMIN — Medication 1 UNITS: at 09:21

## 2018-02-15 RX ADMIN — BACLOFEN 20 MG: 10 TABLET ORAL at 20:08

## 2018-02-15 RX ADMIN — HYDRALAZINE HYDROCHLORIDE 50 MG: 25 TABLET, FILM COATED ORAL at 21:45

## 2018-02-15 RX ADMIN — BACLOFEN 20 MG: 10 TABLET ORAL at 13:26

## 2018-02-15 RX ADMIN — METOPROLOL TARTRATE 50 MG: 50 TABLET, FILM COATED ORAL at 20:12

## 2018-02-15 RX ADMIN — ISOSORBIDE MONONITRATE 30 MG: 30 TABLET ORAL at 13:26

## 2018-02-15 RX ADMIN — INSULIN LISPRO 1 UNITS: 100 INJECTION, SOLUTION INTRAVENOUS; SUBCUTANEOUS at 21:41

## 2018-02-15 RX ADMIN — AMIODARONE HYDROCHLORIDE 200 MG: 200 TABLET ORAL at 20:08

## 2018-02-15 RX ADMIN — Medication 10 ML: at 20:14

## 2018-02-15 RX ADMIN — Medication 1 UNITS: at 16:36

## 2018-02-15 RX ADMIN — METOPROLOL TARTRATE 50 MG: 50 TABLET, FILM COATED ORAL at 11:56

## 2018-02-15 RX ADMIN — MEXILETINE HYDROCHLORIDE 300 MG: 150 CAPSULE ORAL at 07:36

## 2018-02-15 RX ADMIN — ACETAMINOPHEN 650 MG: 325 TABLET ORAL at 20:28

## 2018-02-15 RX ADMIN — BACLOFEN 20 MG: 10 TABLET ORAL at 09:19

## 2018-02-15 RX ADMIN — MEXILETINE HYDROCHLORIDE 300 MG: 150 CAPSULE ORAL at 13:27

## 2018-02-15 RX ADMIN — AMIODARONE HYDROCHLORIDE 200 MG: 200 TABLET ORAL at 09:15

## 2018-02-15 RX ADMIN — Medication 5 UNITS: at 11:57

## 2018-02-15 RX ADMIN — HYDRALAZINE HYDROCHLORIDE 50 MG: 25 TABLET, FILM COATED ORAL at 13:28

## 2018-02-15 RX ADMIN — POTASSIUM CHLORIDE 20 MEQ: 20 TABLET, EXTENDED RELEASE ORAL at 09:17

## 2018-02-15 RX ADMIN — TAMSULOSIN HYDROCHLORIDE 0.4 MG: 0.4 CAPSULE ORAL at 09:18

## 2018-02-15 RX ADMIN — ACETAMINOPHEN 650 MG: 325 TABLET ORAL at 09:19

## 2018-02-15 ASSESSMENT — PAIN SCALES - GENERAL
PAINLEVEL_OUTOF10: 0
PAINLEVEL_OUTOF10: 4
PAINLEVEL_OUTOF10: 4
PAINLEVEL_OUTOF10: 2
PAINLEVEL_OUTOF10: 0
PAINLEVEL_OUTOF10: 4

## 2018-02-15 ASSESSMENT — PAIN DESCRIPTION - DESCRIPTORS: DESCRIPTORS: ACHING

## 2018-02-15 ASSESSMENT — PAIN DESCRIPTION - LOCATION: LOCATION: BACK

## 2018-02-15 ASSESSMENT — PAIN DESCRIPTION - PAIN TYPE: TYPE: ACUTE PAIN

## 2018-02-15 ASSESSMENT — PAIN DESCRIPTION - ORIENTATION: ORIENTATION: LOWER

## 2018-02-15 NOTE — CONSULTS
2012    GERD (gastroesophageal reflux disease)     H/O cardiac catheterization 4/30/2014    Hyperlipidemia     Hypertension     Paroxysmal [facility] fibrillation ([facility] 7/22/2014    V tach ([facility]     V-tach [facility])     s/p ablation in dec 2014       Past Surgical History:    Past Surgical History:   Procedure Laterality Date    CARDIAC CATHETERIZATION  3/20/14     [facility]    CARDIAC DEFIBRILLATOR PLACEMENT  10/13/2015    [employer] [place], MRI CONDITIONAL ICD    CORONARY ARTERY BYPASS GRAFT  5-9-14    3 bypass    EKG 12-LEAD  7/17/2015         OTHER SURGICAL HISTORY Left 10/21/14    Left Bursectomy, I & D Left Elbow - Dr. Maxime Wang LAMINECTOMY,>2 [facility] N/A 1/16/2018    LUMBAR AND SACRAL LAMINECTOMY, REMOVAL OF INTRASPINAL TUMORS performed by Mu Nunes MD at [id number] harvest from legs       Medications Prior to Admission:    Prescriptions Prior to Admission: warfarin (COUMADIN) 5 MG tablet, Take 5 mg by mouth  metFORMIN (GLUCOPHAGE) 1000 MG tablet, Take 1 tablet by mouth 2 times daily (with meals)  baclofen (LIORESAL) 20 MG tablet, Take 1 tablet by mouth 3 times daily  amiodarone (CORDARONE) 200 MG tablet, TAKE 1 TABLET BY MOUTH TWICE DAILY  nicotine (NICODERM CQ) 21 MG/24HR, Place 1 patch onto the skin every 24 hours  ALPRAZolam (XANAX) 0.25 MG tablet, Take 1 tablet by mouth nightly as needed for Sleep for up to 30 days.   linagliptin (TRADJENTA) 5 MG tablet, Take 1 tablet by mouth daily  potassium chloride (KLOR-CON M) 20 MEQ extended release tablet, TAKE 1 TABLET daily  tamsulosin (FLOMAX) 0.4 MG capsule, Take 1 capsule by mouth daily  sacubitril-valsartan (ENTRESTO) 49-51 MG per tablet, Take 1 tablet by mouth 2 times daily  simvastatin (ZOCOR) 20 MG tablet, Take 1 tablet by mouth nightly  mexiletine (MEXITIL) 150 MG capsule, TAKE 2 CAPSULES THREE TIMES A DAY FOR ATRIAL FIBRILLATION  furosemide (LASIX) 40 MG tablet, TAKE 1 TABLET DAILY FOR

## 2018-02-15 NOTE — PROGRESS NOTES
Hospitalist Progress Note    Patient:  Chucho Charlton      Unit/Bed:3B-31/031-A    YOB: 1965    MRN: [id number]       Acct: [de-identified]     PCP: Day Lee NP    Date of Admission: 2/14/2018    Chief Complaint: chest pain    Hospital Course: Admitted after brief episode of chest pain. Patient with extensive cardiac history, troponins somewhat elevated. Cardiology consulted and plan for cath. Subjective: 2/15/18 - No further pain, feels at baseline       Medications:  Reviewed    Infusion Medications    dextrose 5 % and 0.9 % NaCl       Scheduled Medications    isosorbide mononitrate  30 mg Oral Daily    nicotine  1 patch Transdermal Q24H    finasteride  5 mg Oral Daily    baclofen  20 mg Oral TID    sodium chloride flush  10 mL Intravenous 2 times per day    aspirin  81 mg Oral Daily    amiodarone  200 mg Oral BID    furosemide  40 mg Oral Daily    linagliptin  5 mg Oral Daily    tamsulosin  0.4 mg Oral Daily    metoprolol tartrate  50 mg Oral Q8H    mexiletine  300 mg Oral 3 times per day    potassium chloride  20 mEq Oral Daily with breakfast    sacubitril-valsartan  1 tablet Oral BID    simvastatin  20 mg Oral Nightly    hydrALAZINE  50 mg Oral 3 times per day    nitrofurantoin (macrocrystal-monohydrate)  100 mg Oral 2 times per day    insulin lispro  0-6 Units Subcutaneous TID WC    insulin lispro  0-3 Units Subcutaneous Nightly    enoxaparin  1 mg/kg Subcutaneous BID     PRN Meds: ALPRAZolam, sodium chloride flush, acetaminophen, morphine, magnesium hydroxide, ondansetron, glucose, dextrose, glucagon (rDNA), dextrose 5 % and 0.9 % NaCl      Intake/Output Summary (Last 24 hours) at 02/15/18 1632  Last data filed at 02/15/18 1412   Gross per 24 hour   Intake             1780 ml   Output              700 ml   Net             1080 ml       Diet:  DIET CARDIAC;     Exam:  /73   Pulse 54   Temp 98.3 °F (36.8 °C) (Oral)   Resp 18   Ht 6' 2\" (1.88 m)   Wt 263 lb 12.8 oz (119.7 kg)   SpO2 98%   BMI 33.87 kg/m²     General appearance: No apparent distress, appears stated age and cooperative. HEENT: Pupils equal, round, and reactive to light. Conjunctivae/corneas clear. Neck: Supple, with full range of motion. No jugular venous distention. Trachea midline. Respiratory:  Normal respiratory effort. Clear to auscultation, bilaterally without Rales/Wheezes/Rhonchi. Cardiovascular: Regular rate and rhythm with normal S1/S2 without murmurs, rubs or gallops. Abdomen: Soft, non-tender, non-distended with normal bowel sounds. Musculoskeletal: No clubbing, cyanosis or edema bilaterally. Full range of motion without deformity. Skin: Skin color, texture, turgor normal.  No rashes or lesions. Neurologic:  Neurovascularly intact without any focal sensory/motor deficits. Cranial nerves: II-XII intact, grossly non-focal.  Psychiatric: Alert and oriented, thought content appropriate, normal insight  Capillary Refill: Brisk,< 3 seconds   Peripheral Pulses: +2 palpable, equal bilaterally       Labs:   Recent Labs      02/14/18   0538   WBC  16.5*   HGB  12.1*   HCT  35.7*   PLT  186     Recent Labs      02/14/18   0538   NA  138   K  4.3   CL  102   CO2  21*   BUN  14   CREATININE  1.1   CALCIUM  8.8     Recent Labs      02/14/18   0538   AST  16   ALT  17   BILIDIR  <0.2   BILITOT  0.4   ALKPHOS  79     Recent Labs      02/14/18   0617  02/15/18   0438   INR  2.59*  2.20*     No results for input(s): CKTOTAL, TROPONINI in the last 72 hours. Urinalysis:    Lab Results   Component Value Date    NITRU POSITIVE 02/14/2018    WBCUA > 100 02/14/2018    BACTERIA MANY 02/14/2018    RBCUA 5-10 02/14/2018    BLOODU TRACE 02/14/2018    SPECGRAV >1.030 12/20/2017    GLUCOSEU 500 02/14/2018       Radiology:  XR CHEST STANDARD (2 VW)   Final Result    Stable radiographic appearance of the chest. No evidence of an acute process.                **This report has been created using voice

## 2018-02-15 NOTE — PROGRESS NOTES
Cardiology Progress Note      Patient:  Katie Steve  YOB: 1965  MRN: [id number]   Acct: [de-identified]  [facility] Date:  2/14/2018  Primary Cardiologist: Anushka Sanchez  Seen by Dr. Collette Cowden     Per prior note-   Cardiologist:  Dr. Korey Gaffney:  Chest pain     HISTORY OF PRESENT ILLNESS:       The patient is a 46 y.o. male patient presents with chest pain. He is known to Dr. Anushka Sanchez with h/o ischemic CMP, s/p ICD implantation. The patient woke up with retrosternal chest pain with no radiation. It was associated with diaphoresis and nausea. His troponin is positive. According to h&P, he has significant past medical history of CHF, Afib, CAD, cardiomyopathy, DM, pacemaker, CABG who being seen at bedside this this morning. Patient relates acute onset chest pain last night that woke him in his sleep. Describes the pain as sharp and located bilaterally to the chest in the area below his ribs. Relates the episode lasted only a few seconds. States that he was concerned given his heart history. However he also feels he may be related to what he had for dinner that night which was pizza. Relates that the chest pain has resolved and has not experience another episode in the ED. Admits to nausea. Denies F,C,V, SOB.  Denies arm pain, shoulder pain, jaw pain, head aches      Subjective (Events in last 24 hours):     Pt without cardiac c/o today  Awaiting INR to come down    He needs back surgery     VSS  No arrythmias    Objective:   /67   Pulse 68   Temp 98.6 °F (37 °C) (Oral)   Resp 18   Ht 6' 2\" (1.88 m)   Wt 263 lb 12.8 oz (119.7 kg)   SpO2 96%   BMI 33.87 kg/m²        TELEMETRY: SB    Physical Exam:  General Appearance: alert and oriented to person, place and time, in no acute distress  Cardiovascular: normal rate, regular rhythm, normal S1 and S2, no murmurs, rubs, clicks, or gallops, distal pulses intact,  Pulmonary/Chest: clear to auscultation bilaterally- no wheezes, rales or rhonchi, normal air of infarction or ischemia, respectively. Reduced LVEF with   global hypokinesis and severe LV dilation.      Recommendation   Clinical correlation is recommended.   Medical management.   Invasive ischemia workup is recommended if clinically indicated.      Signatures      ----------------------------------------------------------------   Electronically signed by Adonis Miranda MD (Interpreting   Cardiologist) on 12/26/2017      Left Heart Cath:  NATIVE CORONARY ARTERIES:    1. Left main is patent and gives rise to LAD and left circ. 2.    The LAD has nonobstructive mild luminal irregularity. 3.    The diagonal artery has 50-60% mid stenosis. 4.    The left circumflex artery has 80% stenosis with some competitive          flow distally from the LIMA. 5.    Right coronary artery has diffuse 60% stenosis proximally and has          competitive flow distally from the graft. BYPASS VISUALIZATION:    1. SVG to RCA is patent with nonobstructive disease at the anastomosis. 2.    SVG to diagonal is totally occluded with a stump. 3.    LIMA to left circumflex artery is patent. CONCLUSION:  1. Native vessel coronary artery disease as described above. 2.  Occluded graft to the diagonal artery, likely because of the fact that       the native diagonal stenosis does not seem to be that significant. 3.  Nonobstructive disease in the transplanted right coronary artery with a       patent graft to it. 4.  Patent left internal mammary artery to obtuse marginal.  5.  Left ventricular dysfunction. RECOMMENDATION:  At this point, results were discussed with the family. We  will discuss the case with Dr. Jareth Rogers regarding his electrical condition  to decide regarding consideration of EP study and possible PVC ablation. Jh Vazquez M.D.  D: 12/08/2014       Lab Data:    Cardiac Enzymes:  No results for input(s): CKTOTAL, CKMB, CKMBINDEX, TROPONINI in the last 72 hours.     CBC:   Lab

## 2018-02-16 ENCOUNTER — APPOINTMENT (OUTPATIENT)
Dept: CARDIAC CATH/INVASIVE PROCEDURES | Age: 53
DRG: 251 | End: 2018-02-16
Payer: COMMERCIAL

## 2018-02-16 LAB
ABO: NORMAL
ANION GAP SERPL CALCULATED.3IONS-SCNC: 13 MEQ/L (ref 8–16)
ANTIBODY SCREEN: NORMAL
BUN BLDV-MCNC: 18 MG/DL (ref 7–22)
CALCIUM SERPL-MCNC: 8.4 MG/DL (ref 8.5–10.5)
CHLORIDE BLD-SCNC: 104 MEQ/L (ref 98–111)
CO2: 22 MEQ/L (ref 23–33)
CREAT SERPL-MCNC: 1.1 MG/DL (ref 0.4–1.2)
EKG ATRIAL RATE: 57 BPM
EKG P AXIS: 26 DEGREES
EKG P-R INTERVAL: 184 MS
EKG Q-T INTERVAL: 480 MS
EKG QRS DURATION: 92 MS
EKG QTC CALCULATION (BAZETT): 467 MS
EKG R AXIS: 2 DEGREES
EKG T AXIS: 78 DEGREES
EKG VENTRICULAR RATE: 57 BPM
GFR SERPL CREATININE-BSD FRML MDRD: 70 ML/MIN/1.73M2
GLUCOSE BLD-MCNC: 140 MG/DL (ref 70–108)
GLUCOSE BLD-MCNC: 168 MG/DL (ref 70–108)
GLUCOSE BLD-MCNC: 177 MG/DL (ref 70–108)
GLUCOSE BLD-MCNC: 197 MG/DL (ref 70–108)
GLUCOSE BLD-MCNC: 324 MG/DL (ref 70–108)
HCT VFR BLD CALC: 33.1 % (ref 42–52)
HEMOGLOBIN: 11.3 GM/DL (ref 14–18)
INR BLD: 1.36 (ref 0.85–1.13)
MCH RBC QN AUTO: 33.6 PG (ref 27–31)
MCHC RBC AUTO-ENTMCNC: 34.2 GM/DL (ref 33–37)
MCV RBC AUTO: 98.2 FL (ref 80–94)
ORGANISM: ABNORMAL
PDW BLD-RTO: 16.7 % (ref 11.5–14.5)
PLATELET # BLD: 156 THOU/MM3 (ref 130–400)
PMV BLD AUTO: 9.1 FL (ref 7.4–10.4)
POTASSIUM SERPL-SCNC: 3.7 MEQ/L (ref 3.5–5.2)
RBC # BLD: 3.38 MILL/MM3 (ref 4.7–6.1)
RH FACTOR: NORMAL
SODIUM BLD-SCNC: 139 MEQ/L (ref 135–145)
URINE CULTURE REFLEX: ABNORMAL
WBC # BLD: 8.9 THOU/MM3 (ref 4.8–10.8)

## 2018-02-16 PROCEDURE — C1725 CATH, TRANSLUMIN NON-LASER: HCPCS

## 2018-02-16 PROCEDURE — 2580000003 HC RX 258: Performed by: INTERNAL MEDICINE

## 2018-02-16 PROCEDURE — 99232 SBSQ HOSP IP/OBS MODERATE 35: CPT | Performed by: INTERNAL MEDICINE

## 2018-02-16 PROCEDURE — 86900 BLOOD TYPING SEROLOGIC ABO: CPT

## 2018-02-16 PROCEDURE — 36415 COLL VENOUS BLD VENIPUNCTURE: CPT

## 2018-02-16 PROCEDURE — C1760 CLOSURE DEV, VASC: HCPCS

## 2018-02-16 PROCEDURE — 92941 PRQ TRLML REVSC TOT OCCL AMI: CPT | Performed by: INTERNAL MEDICINE

## 2018-02-16 PROCEDURE — 82948 REAGENT STRIP/BLOOD GLUCOSE: CPT

## 2018-02-16 PROCEDURE — 93459 L HRT ART/GRFT ANGIO: CPT | Performed by: INTERNAL MEDICINE

## 2018-02-16 PROCEDURE — 1200000003 HC TELEMETRY R&B

## 2018-02-16 PROCEDURE — 6370000000 HC RX 637 (ALT 250 FOR IP): Performed by: NURSE PRACTITIONER

## 2018-02-16 PROCEDURE — 2780000010 HC IMPLANT OTHER

## 2018-02-16 PROCEDURE — 6370000000 HC RX 637 (ALT 250 FOR IP): Performed by: INTERNAL MEDICINE

## 2018-02-16 PROCEDURE — 85027 COMPLETE CBC AUTOMATED: CPT

## 2018-02-16 PROCEDURE — 85610 PROTHROMBIN TIME: CPT

## 2018-02-16 PROCEDURE — C1769 GUIDE WIRE: HCPCS

## 2018-02-16 PROCEDURE — 80048 BASIC METABOLIC PNL TOTAL CA: CPT

## 2018-02-16 PROCEDURE — 6360000002 HC RX W HCPCS: Performed by: INTERNAL MEDICINE

## 2018-02-16 PROCEDURE — C1876 STENT, NON-COA/NON-COV W/DEL: HCPCS

## 2018-02-16 PROCEDURE — 92928 PRQ TCAT PLMT NTRAC ST 1 LES: CPT | Performed by: INTERNAL MEDICINE

## 2018-02-16 PROCEDURE — 93005 ELECTROCARDIOGRAM TRACING: CPT | Performed by: INTERNAL MEDICINE

## 2018-02-16 PROCEDURE — 2500000003 HC RX 250 WO HCPCS

## 2018-02-16 PROCEDURE — C1894 INTRO/SHEATH, NON-LASER: HCPCS

## 2018-02-16 PROCEDURE — 6370000000 HC RX 637 (ALT 250 FOR IP)

## 2018-02-16 PROCEDURE — 6360000002 HC RX W HCPCS

## 2018-02-16 PROCEDURE — 86850 RBC ANTIBODY SCREEN: CPT

## 2018-02-16 PROCEDURE — 86901 BLOOD TYPING SEROLOGIC RH(D): CPT

## 2018-02-16 PROCEDURE — C1887 CATHETER, GUIDING: HCPCS

## 2018-02-16 RX ORDER — MORPHINE SULFATE 2 MG/ML
2 INJECTION, SOLUTION INTRAMUSCULAR; INTRAVENOUS
Status: ACTIVE | OUTPATIENT
Start: 2018-02-16 | End: 2018-02-16

## 2018-02-16 RX ORDER — SODIUM CHLORIDE 9 MG/ML
INJECTION, SOLUTION INTRAVENOUS CONTINUOUS
Status: DISCONTINUED | OUTPATIENT
Start: 2018-02-16 | End: 2018-02-16 | Stop reason: SDUPTHER

## 2018-02-16 RX ORDER — ATROPINE SULFATE 0.4 MG/ML
0.5 AMPUL (ML) INJECTION
Status: ACTIVE | OUTPATIENT
Start: 2018-02-16 | End: 2018-02-16

## 2018-02-16 RX ORDER — SODIUM CHLORIDE 9 MG/ML
75 INJECTION, SOLUTION INTRAVENOUS CONTINUOUS
Status: ACTIVE | OUTPATIENT
Start: 2018-02-16 | End: 2018-02-17

## 2018-02-16 RX ORDER — SODIUM CHLORIDE 0.9 % (FLUSH) 0.9 %
10 SYRINGE (ML) INJECTION PRN
Status: DISCONTINUED | OUTPATIENT
Start: 2018-02-16 | End: 2018-02-17 | Stop reason: HOSPADM

## 2018-02-16 RX ORDER — WARFARIN SODIUM 3 MG/1
6 TABLET ORAL ONCE
Status: COMPLETED | OUTPATIENT
Start: 2018-02-16 | End: 2018-02-16

## 2018-02-16 RX ORDER — SODIUM CHLORIDE 0.9 % (FLUSH) 0.9 %
10 SYRINGE (ML) INJECTION EVERY 12 HOURS SCHEDULED
Status: DISCONTINUED | OUTPATIENT
Start: 2018-02-16 | End: 2018-02-17 | Stop reason: HOSPADM

## 2018-02-16 RX ORDER — ACETAMINOPHEN 325 MG/1
650 TABLET ORAL EVERY 4 HOURS PRN
Status: DISCONTINUED | OUTPATIENT
Start: 2018-02-16 | End: 2018-02-17 | Stop reason: HOSPADM

## 2018-02-16 RX ADMIN — ENOXAPARIN SODIUM 120 MG: 120 INJECTION SUBCUTANEOUS at 11:52

## 2018-02-16 RX ADMIN — POTASSIUM CHLORIDE 20 MEQ: 20 TABLET, EXTENDED RELEASE ORAL at 08:48

## 2018-02-16 RX ADMIN — Medication 1 UNITS: at 11:51

## 2018-02-16 RX ADMIN — HYDRALAZINE HYDROCHLORIDE 50 MG: 25 TABLET, FILM COATED ORAL at 05:28

## 2018-02-16 RX ADMIN — BACLOFEN 20 MG: 10 TABLET ORAL at 08:50

## 2018-02-16 RX ADMIN — AMIODARONE HYDROCHLORIDE 200 MG: 200 TABLET ORAL at 20:48

## 2018-02-16 RX ADMIN — FINASTERIDE 5 MG: 5 TABLET, FILM COATED ORAL at 08:50

## 2018-02-16 RX ADMIN — HYDRALAZINE HYDROCHLORIDE 50 MG: 25 TABLET, FILM COATED ORAL at 14:21

## 2018-02-16 RX ADMIN — NITROFURANTOIN MONOHYDRATE/MACROCRYSTALLINE 100 MG: 25; 75 CAPSULE ORAL at 20:52

## 2018-02-16 RX ADMIN — NITROFURANTOIN MONOHYDRATE/MACROCRYSTALLINE 100 MG: 25; 75 CAPSULE ORAL at 08:50

## 2018-02-16 RX ADMIN — MEXILETINE HYDROCHLORIDE 300 MG: 150 CAPSULE ORAL at 14:22

## 2018-02-16 RX ADMIN — SODIUM CHLORIDE 75 ML/HR: 9 INJECTION, SOLUTION INTRAVENOUS at 23:11

## 2018-02-16 RX ADMIN — SODIUM CHLORIDE: 9 INJECTION, SOLUTION INTRAVENOUS at 08:41

## 2018-02-16 RX ADMIN — ALPRAZOLAM 0.25 MG: 0.25 TABLET ORAL at 20:24

## 2018-02-16 RX ADMIN — TICAGRELOR 90 MG: 90 TABLET ORAL at 20:52

## 2018-02-16 RX ADMIN — HYDRALAZINE HYDROCHLORIDE 50 MG: 25 TABLET, FILM COATED ORAL at 20:49

## 2018-02-16 RX ADMIN — MEXILETINE HYDROCHLORIDE 300 MG: 150 CAPSULE ORAL at 20:50

## 2018-02-16 RX ADMIN — METOPROLOL TARTRATE 50 MG: 50 TABLET, FILM COATED ORAL at 20:50

## 2018-02-16 RX ADMIN — ALPRAZOLAM 0.25 MG: 0.25 TABLET ORAL at 00:06

## 2018-02-16 RX ADMIN — ASPIRIN 81 MG: 81 TABLET, CHEWABLE ORAL at 08:50

## 2018-02-16 RX ADMIN — BACLOFEN 20 MG: 10 TABLET ORAL at 20:52

## 2018-02-16 RX ADMIN — WARFARIN SODIUM 6 MG: 3 TABLET ORAL at 17:00

## 2018-02-16 RX ADMIN — INSULIN LISPRO 2 UNITS: 100 INJECTION, SOLUTION INTRAVENOUS; SUBCUTANEOUS at 20:52

## 2018-02-16 RX ADMIN — AMIODARONE HYDROCHLORIDE 200 MG: 200 TABLET ORAL at 08:45

## 2018-02-16 RX ADMIN — METOPROLOL TARTRATE 50 MG: 50 TABLET, FILM COATED ORAL at 11:50

## 2018-02-16 RX ADMIN — MEXILETINE HYDROCHLORIDE 300 MG: 150 CAPSULE ORAL at 05:29

## 2018-02-16 RX ADMIN — Medication 20 MG: at 20:52

## 2018-02-16 RX ADMIN — METOPROLOL TARTRATE 50 MG: 50 TABLET, FILM COATED ORAL at 02:46

## 2018-02-16 RX ADMIN — ACETAMINOPHEN 650 MG: 325 TABLET ORAL at 11:45

## 2018-02-16 RX ADMIN — Medication 1 UNITS: at 17:00

## 2018-02-16 RX ADMIN — FUROSEMIDE 40 MG: 40 TABLET ORAL at 11:50

## 2018-02-16 RX ADMIN — TAMSULOSIN HYDROCHLORIDE 0.4 MG: 0.4 CAPSULE ORAL at 08:49

## 2018-02-16 RX ADMIN — ISOSORBIDE MONONITRATE 30 MG: 30 TABLET ORAL at 08:50

## 2018-02-16 RX ADMIN — BACLOFEN 20 MG: 10 TABLET ORAL at 14:21

## 2018-02-16 ASSESSMENT — PAIN SCALES - GENERAL
PAINLEVEL_OUTOF10: 4
PAINLEVEL_OUTOF10: 0

## 2018-02-16 ASSESSMENT — PAIN DESCRIPTION - ORIENTATION: ORIENTATION: MID;LOWER

## 2018-02-16 ASSESSMENT — PAIN DESCRIPTION - FREQUENCY: FREQUENCY: CONTINUOUS

## 2018-02-16 ASSESSMENT — PAIN DESCRIPTION - LOCATION: LOCATION: BACK

## 2018-02-16 ASSESSMENT — PAIN DESCRIPTION - ONSET: ONSET: ON-GOING

## 2018-02-16 ASSESSMENT — PAIN DESCRIPTION - PAIN TYPE: TYPE: SURGICAL PAIN

## 2018-02-16 ASSESSMENT — PAIN DESCRIPTION - DESCRIPTORS: DESCRIPTORS: ACHING

## 2018-02-16 NOTE — PROGRESS NOTES
Inpatient Cardiac Rehabilitation Consult    Received consult for Phase II Cardiac Rehabilitation. Cardiac Rehab education completed with patient. Pt would like staff to give him a call at home to schedule. Brochure given.

## 2018-02-16 NOTE — PROGRESS NOTES
Hospitalist Progress Note    Patient:  Natasha Plaster      Unit/Bed:3B-31/031-A    YOB: 1965    MRN: [id number]       Acct: [de-identified]     PCP: Umer Salazar NP    Date of Admission: 2/14/2018    Chief Complaint: chest pain    Hospital Course: Admitted after brief episode of chest pain. Patient with extensive cardiac history, troponins somewhat elevated. Cardiology consulted and plan for cath.    2/15/18 - No further pain, feels at baseline     Subjective: 2/16/18 - Doing well after cath      Medications:  Reviewed    Infusion Medications    sodium chloride      dextrose 5 % and 0.9 % NaCl       Scheduled Medications    sodium chloride flush  10 mL Intravenous 2 times per day    ticagrelor  90 mg Oral BID    isosorbide mononitrate  30 mg Oral Daily    nicotine  1 patch Transdermal Q24H    finasteride  5 mg Oral Daily    baclofen  20 mg Oral TID    aspirin  81 mg Oral Daily    amiodarone  200 mg Oral BID    furosemide  40 mg Oral Daily    linagliptin  5 mg Oral Daily    tamsulosin  0.4 mg Oral Daily    metoprolol tartrate  50 mg Oral Q8H    mexiletine  300 mg Oral 3 times per day    potassium chloride  20 mEq Oral Daily with breakfast    sacubitril-valsartan  1 tablet Oral BID    simvastatin  20 mg Oral Nightly    hydrALAZINE  50 mg Oral 3 times per day    nitrofurantoin (macrocrystal-monohydrate)  100 mg Oral 2 times per day    insulin lispro  0-6 Units Subcutaneous TID WC    insulin lispro  0-3 Units Subcutaneous Nightly    enoxaparin  1 mg/kg Subcutaneous BID     PRN Meds: sodium chloride flush, acetaminophen, atropine, morphine, ALPRAZolam, morphine, magnesium hydroxide, ondansetron, glucose, dextrose, glucagon (rDNA), dextrose 5 % and 0.9 % NaCl      Intake/Output Summary (Last 24 hours) at 02/16/18 1352  Last data filed at 02/16/18 1340   Gross per 24 hour   Intake          1178.59 ml   Output             1050 ml   Net           128.59 ml       Diet:  DIET

## 2018-02-16 NOTE — PROCEDURES
EKG was handed to [facility].
right coronary angiography and graft angiography were  performed using 6-[place]ayan JR4, JL4, pigtail catheters and 6-[place]aguayan AR2  diagnostic catheter. LVEDP was estimated at 24 mmHg. The diagnostic angiography revealed an 85% mid circumflex stenosis and  findings as outlined above. Decision was made to revascularize the mid circumflex vessel. A 6-[place] EBU guide catheter was used to intubate the left coronary ostium  and a 0.014 BMW wire was advanced into the circumflex vessel distal to the  stenosis. The lesion was predilated with a 3.0 x 12 mm Trek balloon  followed by a 3.5 x 15 mm Trek balloon. The intention was to do only  balloon angioplasty after discussion with the patient's surgeon who  intended to do back surgery in a month. Following the second balloon usage, there was mild contrast seen  unilaterally. At this point, it was decided to revascularize the vessel  using bare metal stents in view of the pending surgery. A 3.5 x 23 mm bare  metal stent was implanted and successfully deployed. There was no thinning  of the vessel following this procedure. However, angiography showed distal  to the stent, there was a space-occupying lesion. This was covered with a  3.5 x 15 mm balloon. Coronary angiography revealed widely patent circumflex vessel with JESÚS  grade 3 flow distally. The patient tolerated the procedure well. He received Brilinta postprocedure and an Angio-Seal device was placed. The patient was transferred back to his floor for further monitoring and  management.         Spike Tavarez MD    D: 02/16/2018 12:07:04       T: 02/16/2018 13:01:14     [email or web address]  Job#: [id number]     Doc#: [id number]    CC:

## 2018-02-16 NOTE — PROGRESS NOTES
Nutrition Education    Type and Reason for Visit: Initial, Consult, Patient Education    Patient stated he doesn't cook much. Doesn't add salt or cook with it. He appears to consume 3 meals/day. Has been trying to lose weight and trying [employer], salads instead of fast food. Good appetite. Was NPO for procedure. · Verbally reviewed following information with pt. On cardiac CHF diet. Pt. Voiced basic understanding using teachback. Reviewed food label with pt. · Written educational materials provided. · Contact name and number provided. · Refer to Patient Education activity for more details.     Electronically signed by Jeffry Michaud RD, LD on 2/16/18 at 12:09 PM    Contact Number: [phone]

## 2018-02-16 NOTE — POST SEDATION
Sedation Post Procedure Note    Patient Name: Danilo Vazquez   YOB: 1965  Room/Bed: [address] [place] [address]  Medical Record Number: [id number]  Date: 2/16/2018   Time: 1:27 PM         Physicians/Assistants: Rodolfo Demarco MD    Procedure Performed: PCI to CX  Post-Sedation Vital Signs:  Vitals:    02/16/18 1315   BP: (!) 167/87   Pulse: 55   Resp: 14   Temp:    SpO2: 98%      Vital signs were reviewed and were stable after the procedure (see flow sheet for vitals)            Post-Sedation Exam: Lungs: clear and Cardiovascular: normal           Complications: none    Electronically signed by Rodolfo Demarco MD on 2/16/2018 at 1:27 PM

## 2018-02-16 NOTE — PLAN OF CARE
Problem: Pain:  Goal: Pain level will decrease  Pain level will decrease   Outcome: Ongoing  Patient had head ache at beginning of shift. Tylenol given pain goal is 0/10    Problem: Falls - Risk of  Goal: Absence of falls  Outcome: Ongoing  Patient has had no falls this shift. Steady on feet, alert and oriented, bed alarm on. Problem: Pain Control  Goal: Maintain pain level at or below patient's acceptable level (or 5 if patient is unable to determine acceptable level)  Outcome: Ongoing      Problem: Cardiovascular  Goal: Hemodynamic stability  Outcome: Ongoing  Heart cath scheduled for patient today    Problem: Cardiac Output - Decreased:  Goal: Hemodynamic stability will improve  Hemodynamic stability will improve   Outcome: Ongoing  Patient stable at this time. Heart cath scheduled     Comments: Care plan reviewed with patient. Patient verbalize understanding of the plan of care and contribute to goal setting.

## 2018-02-17 VITALS
OXYGEN SATURATION: 95 % | SYSTOLIC BLOOD PRESSURE: 157 MMHG | TEMPERATURE: 98 F | HEART RATE: 60 BPM | HEIGHT: 74 IN | BODY MASS INDEX: 33.65 KG/M2 | WEIGHT: 262.2 LBS | RESPIRATION RATE: 18 BRPM | DIASTOLIC BLOOD PRESSURE: 83 MMHG

## 2018-02-17 LAB
GLUCOSE BLD-MCNC: 152 MG/DL (ref 70–108)
GLUCOSE BLD-MCNC: 306 MG/DL (ref 70–108)
INR BLD: 1.21 (ref 0.85–1.13)

## 2018-02-17 PROCEDURE — 6370000000 HC RX 637 (ALT 250 FOR IP): Performed by: INTERNAL MEDICINE

## 2018-02-17 PROCEDURE — 6370000000 HC RX 637 (ALT 250 FOR IP): Performed by: NURSE PRACTITIONER

## 2018-02-17 PROCEDURE — 85610 PROTHROMBIN TIME: CPT

## 2018-02-17 PROCEDURE — 99238 HOSP IP/OBS DSCHRG MGMT 30/<: CPT | Performed by: INTERNAL MEDICINE

## 2018-02-17 PROCEDURE — 82948 REAGENT STRIP/BLOOD GLUCOSE: CPT

## 2018-02-17 PROCEDURE — 36415 COLL VENOUS BLD VENIPUNCTURE: CPT

## 2018-02-17 PROCEDURE — 99231 SBSQ HOSP IP/OBS SF/LOW 25: CPT | Performed by: NURSE PRACTITIONER

## 2018-02-17 RX ORDER — NITROFURANTOIN 25; 75 MG/1; MG/1
100 CAPSULE ORAL EVERY 12 HOURS SCHEDULED
Qty: 4 CAPSULE | Refills: 0 | Status: SHIPPED | OUTPATIENT
Start: 2018-02-17 | End: 2018-02-19

## 2018-02-17 RX ORDER — ASPIRIN 81 MG/1
81 TABLET ORAL DAILY
Qty: 30 TABLET | Refills: 0 | Status: SHIPPED | OUTPATIENT
Start: 2018-02-17 | End: 2018-06-18 | Stop reason: SDUPTHER

## 2018-02-17 RX ORDER — WARFARIN SODIUM 3 MG/1
6 TABLET ORAL ONCE
Status: DISCONTINUED | OUTPATIENT
Start: 2018-02-17 | End: 2018-02-17 | Stop reason: HOSPADM

## 2018-02-17 RX ORDER — ALPRAZOLAM 0.25 MG/1
0.25 TABLET ORAL ONCE
Status: COMPLETED | OUTPATIENT
Start: 2018-02-17 | End: 2018-02-17

## 2018-02-17 RX ORDER — ISOSORBIDE MONONITRATE 30 MG/1
30 TABLET, EXTENDED RELEASE ORAL DAILY
Qty: 30 TABLET | Refills: 0 | Status: SHIPPED | OUTPATIENT
Start: 2018-02-18 | End: 2018-03-19 | Stop reason: SDUPTHER

## 2018-02-17 RX ORDER — ASPIRIN 81 MG/1
81 TABLET ORAL DAILY
Status: DISCONTINUED | OUTPATIENT
Start: 2018-02-17 | End: 2018-02-17 | Stop reason: HOSPADM

## 2018-02-17 RX ADMIN — ASPIRIN 81 MG: 81 TABLET, COATED ORAL at 14:37

## 2018-02-17 RX ADMIN — FUROSEMIDE 40 MG: 40 TABLET ORAL at 08:04

## 2018-02-17 RX ADMIN — NITROFURANTOIN MONOHYDRATE/MACROCRYSTALLINE 100 MG: 25; 75 CAPSULE ORAL at 08:08

## 2018-02-17 RX ADMIN — FINASTERIDE 5 MG: 5 TABLET, FILM COATED ORAL at 08:08

## 2018-02-17 RX ADMIN — AMIODARONE HYDROCHLORIDE 200 MG: 200 TABLET ORAL at 08:02

## 2018-02-17 RX ADMIN — METOPROLOL TARTRATE 50 MG: 50 TABLET, FILM COATED ORAL at 11:58

## 2018-02-17 RX ADMIN — TAMSULOSIN HYDROCHLORIDE 0.4 MG: 0.4 CAPSULE ORAL at 08:07

## 2018-02-17 RX ADMIN — BACLOFEN 20 MG: 10 TABLET ORAL at 13:35

## 2018-02-17 RX ADMIN — HYDRALAZINE HYDROCHLORIDE 50 MG: 25 TABLET, FILM COATED ORAL at 13:34

## 2018-02-17 RX ADMIN — ACETAMINOPHEN 650 MG: 325 TABLET ORAL at 00:49

## 2018-02-17 RX ADMIN — HYDRALAZINE HYDROCHLORIDE 50 MG: 25 TABLET, FILM COATED ORAL at 06:54

## 2018-02-17 RX ADMIN — Medication 1 UNITS: at 08:08

## 2018-02-17 RX ADMIN — METOPROLOL TARTRATE 50 MG: 50 TABLET, FILM COATED ORAL at 03:05

## 2018-02-17 RX ADMIN — ACETAMINOPHEN 650 MG: 325 TABLET ORAL at 13:35

## 2018-02-17 RX ADMIN — ALPRAZOLAM 0.25 MG: 0.25 TABLET ORAL at 00:49

## 2018-02-17 RX ADMIN — MEXILETINE HYDROCHLORIDE 300 MG: 150 CAPSULE ORAL at 06:54

## 2018-02-17 RX ADMIN — POTASSIUM CHLORIDE 20 MEQ: 20 TABLET, EXTENDED RELEASE ORAL at 08:06

## 2018-02-17 RX ADMIN — MEXILETINE HYDROCHLORIDE 300 MG: 150 CAPSULE ORAL at 13:34

## 2018-02-17 RX ADMIN — BACLOFEN 20 MG: 10 TABLET ORAL at 08:08

## 2018-02-17 RX ADMIN — Medication 4 UNITS: at 11:59

## 2018-02-17 RX ADMIN — ISOSORBIDE MONONITRATE 30 MG: 30 TABLET ORAL at 08:08

## 2018-02-17 RX ADMIN — TICAGRELOR 90 MG: 90 TABLET ORAL at 08:08

## 2018-02-17 ASSESSMENT — PAIN DESCRIPTION - DESCRIPTORS
DESCRIPTORS: ACHING
DESCRIPTORS: ACHING

## 2018-02-17 ASSESSMENT — PAIN DESCRIPTION - PAIN TYPE: TYPE: CHRONIC PAIN

## 2018-02-17 ASSESSMENT — PAIN DESCRIPTION - ORIENTATION: ORIENTATION: LOWER

## 2018-02-17 ASSESSMENT — PAIN DESCRIPTION - LOCATION: LOCATION: BACK

## 2018-02-17 ASSESSMENT — PAIN DESCRIPTION - FREQUENCY
FREQUENCY: INTERMITTENT
FREQUENCY: INTERMITTENT

## 2018-02-17 ASSESSMENT — PAIN SCALES - GENERAL
PAINLEVEL_OUTOF10: 4
PAINLEVEL_OUTOF10: 5
PAINLEVEL_OUTOF10: 0
PAINLEVEL_OUTOF10: 5

## 2018-02-17 NOTE — DISCHARGE INSTR - DIET
 Good nutrition is important when healing from an illness, injury, or surgery. Follow any nutrition recommendations given to you during your hospital stay.  If you were given an oral nutrition supplement while in the hospital, continue to take this supplement at home. You can take it with meals, in-between meals, and/or before bedtime. These supplements can be purchased at most local grocery stores, pharmacies, and chain super-stores.  If you have any questions about your diet or nutrition, call the hospital and ask for the dietitian. Avoid foods high in fats and cholesterol. Eat baked, boiled, broiled, grilled, steamed, poached or microwaved foods instead of fried or deep fried foods. Avoid using salt shaker and read labels to choose low salt/sodium options.   When possible choose fresh or frozen veggies over canned  Follow guidelines given to you by the dietician for heart healthy diet

## 2018-02-17 NOTE — PROGRESS NOTES
murmurs, rubs, clicks, or gallops, distal pulses intact,  Pulmonary/Chest: clear to auscultation bilaterally- no wheezes, rales or rhonchi, normal air movement, no respiratory distress  Abdomen: soft, non-tender, non-distended, normal bowel sounds, no masses Extremities: no cyanosis, clubbing or edema, pulses present   Skin: warm and dry, no rash or erythema  Head: normocephalic and atraumatic   Musculoskeletal: normal range of motion, no joint swelling, deformity or tenderness  Neurological: alert, oriented, normal speech, no focal findings or movement disorder noted    Medications:    warfarin  6 mg Oral Once    sodium chloride flush  10 mL Intravenous 2 times per day    ticagrelor  90 mg Oral BID    warfarin (COUMADIN) daily dosing (placeholder)   Other RX Placeholder    isosorbide mononitrate  30 mg Oral Daily    nicotine  1 patch Transdermal Q24H    finasteride  5 mg Oral Daily    baclofen  20 mg Oral TID    amiodarone  200 mg Oral BID    furosemide  40 mg Oral Daily    linagliptin  5 mg Oral Daily    tamsulosin  0.4 mg Oral Daily    metoprolol tartrate  50 mg Oral Q8H    mexiletine  300 mg Oral 3 times per day    potassium chloride  20 mEq Oral Daily with breakfast    sacubitril-valsartan  1 tablet Oral BID    simvastatin  20 mg Oral Nightly    hydrALAZINE  50 mg Oral 3 times per day    nitrofurantoin (macrocrystal-monohydrate)  100 mg Oral 2 times per day    insulin lispro  0-6 Units Subcutaneous TID WC    insulin lispro  0-3 Units Subcutaneous Nightly      dextrose 5 % and 0.9 % NaCl         sodium chloride flush 10 mL PRN   acetaminophen 650 mg Q4H PRN   ALPRAZolam 0.25 mg Nightly PRN   morphine 2 mg Q3H PRN   magnesium hydroxide 30 mL Daily PRN   ondansetron 4 mg Q6H PRN   glucose 15 g PRN   dextrose 12.5 g PRN   glucagon (rDNA) 1 mg PRN   dextrose 5 % and 0.9 % NaCl  PRN       Diagnostics:  EK2018 05:35:16 [facility] ROUTINE RETRIEVAL  Normal sinus thickness.   Pacer Wire visualized in right ventricle.      Pericardial Effusion   The pericardium was normal in appearance with no evidence of a pericardial   effusion.          Stress:  Summary   There are no significant resting or stress-induced perfusion defects   indicative of infarction or ischemia, respectively. Reduced LVEF with   global hypokinesis and severe LV dilation.      Recommendation   Clinical correlation is recommended.   Medical management.   Invasive ischemia workup is recommended if clinically indicated.      Signatures      ----------------------------------------------------------------   Electronically signed by Allan Esquivel MD (Interpreting   Cardiologist) on 12/26/2017      Left Heart Cath:  FINDINGS:  1. SVG to diagonal - 100% ostial.  SVG to RCA - patent vein graft and  distal native vessels. 2.  LIMA to LAD - patent graft and native vessels. 3.  Native RCA, severe diffuse proximal disease. 4.  Left coronary system: The left main vessel is normal.  5.  The circumflex vessel is of large caliber and has an 85% mid vessel  stenosis. 6.  The left anterior descending artery is 100% occluded in its mid  portion. Diagonal branch is without significant disease      -- RCA-  A 3.5 x 23 mm bare  metal stent was implanted and successfully deployed      Kyle Goodman MD   D: 02/16/2018      Lab Data:    Cardiac Enzymes:  No results for input(s): CKTOTAL, CKMB, CKMBINDEX, TROPONINI in the last 72 hours.     CBC:   Lab Results   Component Value Date    WBC 8.9 02/16/2018    RBC 3.38 02/16/2018    RBC 4.75 11/03/2011    HGB 11.3 02/16/2018    HCT 33.1 02/16/2018     02/16/2018       CMP:    Lab Results   Component Value Date     02/16/2018    K 3.7 02/16/2018    K 5.1 01/24/2018     02/16/2018    CO2 22 02/16/2018    BUN 18 02/16/2018    CREATININE 1.1 02/16/2018    LABGLOM 70 02/16/2018    GLUCOSE 140 02/16/2018    CALCIUM 8.4 02/16/2018       Hepatic Function Panel:    Lab Results   Component Value Date    ALKPHOS 79 02/14/2018    ALT 17 02/14/2018    AST 16 02/14/2018    PROT 5.8 02/14/2018    BILITOT 0.4 02/14/2018    BILIDIR <0.2 02/14/2018    LABALBU 3.1 02/14/2018       Magnesium:    Lab Results   Component Value Date    MG 2.0 02/14/2018       PT/INR:    Lab Results   Component Value Date    INR 1.21 02/17/2018       HgBA1c:    Lab Results   Component Value Date    LABA1C 7.1 01/23/2018       FLP:    Lab Results   Component Value Date    TRIG 142 07/17/2015    HDL 34 07/17/2015    LDLCALC 84 07/17/2015       TSH:    Lab Results   Component Value Date    TSH 2.690 01/04/2017         Assessment:    Chest pain / [place]   S\p cardiac cath - BMS to RCA 2/16/18    Elevated trop: 0.026 / 0.03   EKG no ischemic changes    +Meth by tox screen- chronic ??     ICDMP EF 40%   - ICD    PAFB - SR at present  Amio/ mexitil   FLYPE1MBPT at least 2-3   On couamdin    CABG x3 2014    Spinal tumor- for surgery soon    Hx VT ablation - on mexitil  HTN  HLP    DM II        Plan:    Pt ok to go home     No surgery for at least 3 months - he needs cleared by cardio before surgery     Cardiac Rehab: Yes    Follow-up visits:   Dr. Austen Gaston or Oneil REESE 2-3 weeks      Discharge Medications for PCI/MI (performed or attempted):   · ASA: yes  · Statin: yes  · P2Y12 Inhibitor: yes  · Beta Blocker: yes  · Nitro SL: yes      Discharge Medications for ICD, Cardiomyopathy, CHF:  · Beta Blocker: yes  · ACE Inhibitor/ARB: yes        Electronically signed by Doris Rosales CNP on 2/17/2018 at 1:02 PM

## 2018-02-17 NOTE — FLOWSHEET NOTE
Subjective:  Patient commented that he he is feeling much comfortable since he had his surgery this morning. He commented immediately when I entered the room, \"I am feeling much comfortable since my surgery this morning. \" He told me that he do not have a Denominational home at this moment but believe in God. He also said that when he look at the cross which on the wall in his room, he pray in his heart and thank God for being with him through many of the obstacles God has brought him through. Objective: The room was dry, there were few personal belongings in the corner on the other chair. There were several soda cans on the tray table. Patient had several IV horses connected to him through which he was receiving medication. Assessment:   Patient has experienced a lose in his marriage and appeared not to be having good relationship with his ex-wife. He also appeared to be struggling spiritually because if several major medical issues and major surgeries he has experienced in his life. Patient also appeared to regret about some of the bad decisions he has made in his life which has caused him major loses. Patient's two major strengths includes: A) his strong relationship with his children, B) his attempt to renew his relationship with God by trusting him despite his life challenging problems. Plan:  I will attempt to follow up with patient daily to follow to offer listening regarding his illness and how he is finding a renew meaning and purpose through his illness and to provide support while he is working through his relationship with his ex-wife. Prayer will be offered and if needed a referral to support group for those who have experienced loses through divorce.

## 2018-02-17 NOTE — DISCHARGE SUMMARY
Discharge Summary     Patient Identification:  Watson Rodriguez  : 1965  MRN: [id number]   Account: [de-identified]     Admit date: 2018  Discharge date: 18   Attending provider: Clifton Smith DO        Primary care provider: Ollie Doss NP     Discharge Diagnoses:   Principal Problem:    NSTEMI (non-ST elevated myocardial infarction) [facility])  Active Problems:    Chest pain    Hx of CABG    ICD (implantable cardioverter-defibrillator) in place    Essential hypertension    Mixed hyperlipidemia  Resolved Problems:    * No resolved hospital problems. [facility] Course:   Admitted after brief episode of chest pain. Patient with extensive cardiac history, troponins somewhat elevated. Cardiology consulted and plan for cath. 2/15/18 - No further pain, feels at baseline      Subjective: 18 - Doing well after cath  Plan:  NSTEMI - ongoing   · Cardiology plans cath once INR okay  · Vitamin K 2.5mg given, patient to be covered with lovenox full dose starting tonight  · NPO after MN for hopefully cath tomorrow  18 - Successful cath with stent - monitor overnight and anticipate discharge tomorrow        18 - Discharge without issue    Discharge Medications:   Jaja Ronco   Home Medication Instructions FET:[id number]    Printed on:18 4483   Medication Information                      acetaminophen 650 MG TABS  Take 650 mg by mouth every 4 hours as needed             ALPRAZolam (XANAX) 0.25 MG tablet  Take 1 tablet by mouth nightly as needed for Sleep for up to 30 days.              amiodarone (CORDARONE) 200 MG tablet  TAKE 1 TABLET BY MOUTH TWICE DAILY             aspirin EC 81 MG EC tablet  Take 1 tablet by mouth daily             baclofen (LIORESAL) 20 MG tablet  Take 1 tablet by mouth 3 times daily             finasteride (PROSCAR) 5 MG tablet  Take 1 tab Daily             furosemide (LASIX) 40 MG tablet  TAKE 1 TABLET DAILY FOR TREATMENT WITH DIURETIC THERAPY mediastinum is not widened. Stable position of the AICD cardiac pacer unit. There are no pulmonary infiltrates or effusions. The pulmonary vascularity is normal. No suspicious osseous lesions are present. Stable radiographic appearance of the chest. No evidence of an acute process. **This report has been created using voice recognition software. It may contain minor errors which are inherent in voice recognition technology. ** Final report electronically signed by Dr. Wendi Nicolas on 2/14/2018 6:09 AM      Labs:   Recent Results (from the past 72 hour(s))   POCT Glucose    Collection Time: 02/14/18  8:14 PM   Result Value Ref Range    POC Glucose 214 (H) 70 - 108 mg/dl   Protime-INR    Collection Time: 02/15/18  4:38 AM   Result Value Ref Range    INR 2.20 (H) 0.85 - 1.13   POCT Glucose    Collection Time: 02/15/18  6:58 AM   Result Value Ref Range    POC Glucose 148 (H) 70 - 108 mg/dl   POCT Glucose    Collection Time: 02/15/18 11:10 AM   Result Value Ref Range    POC Glucose 377 (H) 70 - 108 mg/dl   Troponin    Collection Time: 02/15/18  1:32 PM   Result Value Ref Range    Troponin T 0.028 (A) ng/ml   POCT Glucose    Collection Time: 02/15/18  3:55 PM   Result Value Ref Range    POC Glucose 153 (H) 70 - 108 mg/dl   POCT Glucose    Collection Time: 02/15/18  8:13 PM   Result Value Ref Range    POC Glucose 224 (H) 70 - 108 mg/dl   Protime-INR    Collection Time: 02/16/18  3:14 AM   Result Value Ref Range    INR 1.36 (H) 0.85 - 1.13   CBC    Collection Time: 02/16/18  3:14 AM   Result Value Ref Range    WBC 8.9 4.8 - 10.8 thou/mm3    RBC 3.38 (L) 4.70 - 6.10 mill/mm3    Hemoglobin 11.3 (L) 14.0 - 18.0 gm/dl    Hematocrit 33.1 (L) 42.0 - 52.0 %    MCV 98.2 (H) 80.0 - 94.0 fL    MCH 33.6 (H) 27.0 - 31.0 pg    MCHC 34.2 33.0 - 37.0 gm/dl    RDW 16.7 (H) 11.5 - 14.5 %    Platelets 819 066 - 786 thou/mm3    MPV 9.1 7.4 - 10.4 fL   Basic Metabolic Panel    Collection Time: 02/16/18  3:14 AM   Result Value Ref Range

## 2018-02-18 ENCOUNTER — CARE COORDINATION (OUTPATIENT)
Dept: CASE MANAGEMENT | Age: 53
End: 2018-02-18

## 2018-02-18 NOTE — CARE COORDINATION
Spoke with Ruiz Alarcon from [facility], she stated she will pass along to the nurse in charge that Samia Saleem was discharged home yesterday.

## 2018-02-19 ENCOUNTER — TELEPHONE (OUTPATIENT)
Dept: FAMILY MEDICINE CLINIC | Age: 53
End: 2018-02-19

## 2018-02-20 ENCOUNTER — HOSPITAL ENCOUNTER (OUTPATIENT)
Dept: MRI IMAGING | Age: 53
Discharge: HOME | End: 2018-02-20
Payer: COMMERCIAL

## 2018-02-20 ENCOUNTER — HOSPITAL ENCOUNTER (OUTPATIENT)
Dept: PHARMACY | Age: 53
Setting detail: THERAPIES SERIES
Discharge: HOME | End: 2018-02-20
Payer: COMMERCIAL

## 2018-02-20 DIAGNOSIS — I48.0 PAROXYSMAL ATRIAL FIBRILLATION (HCC): ICD-10-CM

## 2018-02-20 DIAGNOSIS — Z98.890 STATUS POST LUMBAR SURGERY: ICD-10-CM

## 2018-02-20 DIAGNOSIS — D49.2 LUMBAR SPINE TUMOR: ICD-10-CM

## 2018-02-20 LAB — POC INR: 3.9 (ref 0.8–1.2)

## 2018-02-20 PROCEDURE — 85610 PROTHROMBIN TIME: CPT | Performed by: PHARMACIST

## 2018-02-20 PROCEDURE — 99211 OFF/OP EST MAY X REQ PHY/QHP: CPT | Performed by: PHARMACIST

## 2018-02-20 PROCEDURE — 36416 COLLJ CAPILLARY BLOOD SPEC: CPT | Performed by: PHARMACIST

## 2018-02-20 NOTE — PROGRESS NOTES
The [address]  Anticoagulation Clinic  [phone] (phone)  [phone] (fax)  Mr. Oumou Gomez is a 46 y.o.  male with history of Afib who presents today for anticoagulation monitoring and adjustment. Pt had 2 stents placed last week. Is now on Brilinta. He is still awaiting back surgery. Patient verifies current dosing regimen and tablet strength. No missed or extra doses. Patient denies s/s bleeding/bruising/swelling/SOB/chest pain  No blood in urine or stool. No dietary changes. No changes in medication/OTC agents/Herbals. No change in alcohol use or tobacco use. No change in activity level. Patient denies headaches/dizziness/lightheadedness/falls. No vomiting/diarrhea or acute illness. No Procedures scheduled in the future at this time. Assessment:   Lab Results   Component Value Date    INR 3.90 (H) 02/20/2018    INR 1.21 (H) 02/17/2018    INR 1.36 (H) 02/16/2018     INR supratherapeutic   Recent Labs      02/20/18   1459   INR  3.90*         Plan:  Hold today and then continue Coumadin 5mg MF, 2.5mg TWTHSS. Recheck INR 1 weeks. Patient reminded to call the Anticoagulation Clinic with any signs or symptoms of bleeding or with any medication changes. Patient given instructions utilizing the teach back method. Discharged ambulatory in no apparent distress. After visit summary printed and reviewed with patient.       Medications reviewed and updated on home medication list Yes    Influenza vaccine:     [] given    [x] declined   [] received previously   [] plans to receive at a later time   [x] refused    [] documented in EPIC

## 2018-02-21 ENCOUNTER — OFFICE VISIT (OUTPATIENT)
Dept: FAMILY MEDICINE CLINIC | Age: 53
End: 2018-02-21
Payer: COMMERCIAL

## 2018-02-21 ENCOUNTER — TELEPHONE (OUTPATIENT)
Dept: NEUROSURGERY | Age: 53
End: 2018-02-21

## 2018-02-21 VITALS
BODY MASS INDEX: 34.06 KG/M2 | HEIGHT: 74 IN | WEIGHT: 265.4 LBS | DIASTOLIC BLOOD PRESSURE: 72 MMHG | RESPIRATION RATE: 12 BRPM | TEMPERATURE: 98.1 F | OXYGEN SATURATION: 98 % | SYSTOLIC BLOOD PRESSURE: 134 MMHG | HEART RATE: 54 BPM

## 2018-02-21 DIAGNOSIS — Z95.1 HX OF CABG: ICD-10-CM

## 2018-02-21 DIAGNOSIS — Z79.01 ANTICOAGULATED ON COUMADIN: ICD-10-CM

## 2018-02-21 DIAGNOSIS — E78.2 MIXED HYPERLIPIDEMIA: ICD-10-CM

## 2018-02-21 DIAGNOSIS — I48.0 PAROXYSMAL ATRIAL FIBRILLATION (HCC): ICD-10-CM

## 2018-02-21 DIAGNOSIS — Z95.810 ICD (IMPLANTABLE CARDIOVERTER-DEFIBRILLATOR) IN PLACE: ICD-10-CM

## 2018-02-21 DIAGNOSIS — I10 ESSENTIAL HYPERTENSION: ICD-10-CM

## 2018-02-21 DIAGNOSIS — I50.22 CHRONIC SYSTOLIC CONGESTIVE HEART FAILURE (HCC): ICD-10-CM

## 2018-02-21 DIAGNOSIS — E11.22 CONTROLLED TYPE 2 DIABETES MELLITUS WITH CHRONIC KIDNEY DISEASE, WITHOUT LONG-TERM CURRENT USE OF INSULIN, UNSPECIFIED CKD STAGE (HCC): ICD-10-CM

## 2018-02-21 DIAGNOSIS — I25.810 CORONARY ARTERY DISEASE INVOLVING CORONARY BYPASS GRAFT OF NATIVE HEART WITHOUT ANGINA PECTORIS: Primary | ICD-10-CM

## 2018-02-21 PROCEDURE — 99214 OFFICE O/P EST MOD 30 MIN: CPT | Performed by: NURSE PRACTITIONER

## 2018-02-21 RX ORDER — ATORVASTATIN CALCIUM 80 MG/1
80 TABLET, FILM COATED ORAL DAILY
Qty: 90 TABLET | Refills: 3 | Status: SHIPPED | OUTPATIENT
Start: 2018-02-21 | End: 2018-02-22 | Stop reason: SDUPTHER

## 2018-02-21 ASSESSMENT — ENCOUNTER SYMPTOMS
NAUSEA: 0
SHORTNESS OF BREATH: 0
COUGH: 0
BACK PAIN: 1
ABDOMINAL PAIN: 0

## 2018-02-21 NOTE — TELEPHONE ENCOUNTER
15 mm Trek  balloon; 3.5 x 23 mm BMS stent; 3.5 x 15 mm BMS stent. Angiomax,  Angio-Seal, and Brilinta.     PROCEDURE DETAILS:  After informed consent was obtained, the patient was  brought to the cardiac catheterization laboratory where his right groin was  prepped and draped in the usual fashion. Local anesthetic was administered  using lidocaine and he received Versed/fentanyl intravenously for mild  sedation.     Right femoral arterial access was readily obtained using a micropuncture  kit and a 6-Swedish groin sheath was placed.     Selective left and right coronary angiography and graft angiography were  performed using 6-Swedish JR4, JL4, pigtail catheters and 6-Swedish AR2  diagnostic catheter.     LVEDP was estimated at 24 mmHg.     The diagnostic angiography revealed an 85% mid circumflex stenosis and  findings as outlined above.     Decision was made to revascularize the mid circumflex vessel.     A 6-Swedish EBU guide catheter was used to intubate the left coronary ostium  and a 0.014 BMW wire was advanced into the circumflex vessel distal to the  stenosis. The lesion was predilated with a 3.0 x 12 mm Trek balloon  followed by a 3.5 x 15 mm Trek balloon. The intention was to do only  balloon angioplasty after discussion with the patient's surgeon who  intended to do back surgery in a month.     Following the second balloon usage, there was mild contrast seen  unilaterally. At this point, it was decided to revascularize the vessel  using bare metal stents in view of the pending surgery. A 3.5 x 23 mm bare  metal stent was implanted and successfully deployed. There was no thinning  of the vessel following this procedure. However, angiography showed distal  to the stent, there was a space-occupying lesion.   This was covered with a  3.5 x 15 mm balloon.     Coronary angiography revealed widely patent circumflex vessel with JESÚS  grade 3 flow distally.     The patient tolerated the procedure well.     He

## 2018-02-21 NOTE — PROGRESS NOTES
Visit Information    Have you changed or started any medications since your last visit including any over-the-counter medicines, vitamins, or herbal medicines? yes - see med list    Are you having any side effects from any of your medications? -  no  Have you stopped taking any of your medications? Is so, why? -  no    Have you seen any other physician or provider since your last visit? Yes - Records Obtained  Have you had any other diagnostic tests since your last visit? Yes - Records Obtained  Have you been seen in the emergency room and/or had an admission to a hospital since we last saw you? Yes - Records Obtained  Have you had your routine dental cleaning in the past 6 months? no    Have you activated your [employer] account? If not, what are your barriers?  No: declined      Patient Care Team:  Tyshawn Gamino NP as PCP - General (Certified Nurse Practitioner)  Juliana Chaves MD as Consulting Physician (Orthopedic Surgery)  Estle Prader, RN as Care Transition    Medical History Review  Past Medical, Family, and Social History reviewed and does contribute to the patient presenting condition    Health Maintenance   Topic Date Due    Pneumococcal highest risk (1 of 3 - PCV13) 05/20/1984    Colon cancer screen colonoscopy  05/20/2015    Lipid screen  07/17/2016    Diabetic microalbuminuria test  12/15/2016    Diabetic retinal exam  12/15/2016    TSH testing  01/04/2018    Flu vaccine (1) 01/04/2019 (Originally 9/1/2017)    Diabetic foot exam  07/07/2018    A1C test (Diabetic or Prediabetic)  01/23/2019    Potassium monitoring  02/16/2019    Creatinine monitoring  02/16/2019    DTaP/Tdap/Td vaccine (2 - Td) 06/25/2027    Hepatitis C screen  Addressed    HIV screen  Addressed
tablet 0    warfarin (COUMADIN) 5 MG tablet Take 5 mg by mouth      metFORMIN (GLUCOPHAGE) 1000 MG tablet Take 1 tablet by mouth 2 times daily (with meals) 180 tablet 3    baclofen (LIORESAL) 20 MG tablet Take 1 tablet by mouth 3 times daily 180 tablet 3    amiodarone (CORDARONE) 200 MG tablet TAKE 1 TABLET BY MOUTH TWICE DAILY 180 tablet 3    nicotine (NICODERM CQ) 21 MG/24HR Place 1 patch onto the skin every 24 hours 30 patch 3    hydrALAZINE (APRESOLINE) 50 MG tablet Take 1 tablet by mouth every 8 hours 240 tablet 2    ALPRAZolam (XANAX) 0.25 MG tablet Take 1 tablet by mouth nightly as needed for Sleep for up to 30 days. 30 tablet 2    linagliptin (TRADJENTA) 5 MG tablet Take 1 tablet by mouth daily 30 tablet 0    potassium chloride (KLOR-CON M) 20 MEQ extended release tablet TAKE 1 TABLET daily 180 tablet 1    tamsulosin (FLOMAX) 0.4 MG capsule Take 1 capsule by mouth daily 90 capsule 3    finasteride (PROSCAR) 5 MG tablet Take 1 tab Daily 90 tablet 3    sacubitril-valsartan (ENTRESTO) 49-51 MG per tablet Take 1 tablet by mouth 2 times daily 180 tablet 0    mexiletine (MEXITIL) 150 MG capsule TAKE 2 CAPSULES THREE TIMES A DAY FOR ATRIAL FIBRILLATION 540 capsule 3    furosemide (LASIX) 40 MG tablet TAKE 1 TABLET DAILY FOR TREATMENT WITH DIURETIC THERAPY 90 tablet 3    metoprolol tartrate (LOPRESSOR) 50 MG tablet TAKE 1 TABLET THREE TIMES A DAY FOR ATRIAL FIBRILLATION 270 tablet 3    glucose blood VI test strips (PHIL CONTOUR TEST) strip 1 each by In Vitro route daily 300 each 3    acetaminophen 650 MG TABS Take 650 mg by mouth every 4 hours as needed 120 tablet 3         Vitals:    02/21/18 0944   BP: 134/72   Site: Left Arm   Position: Sitting   Cuff Size: Large Adult   Pulse: 54   Resp: 12   Temp: 98.1 °F (36.7 °C)   TempSrc: Oral   SpO2: 98%   Weight: 265 lb 6.4 oz (120.4 kg)   Height: 6' 2.02\" (1.88 m)     Body mass index is 34.06 kg/m².      Wt Readings from Last 3 Encounters:   02/21/18 265

## 2018-02-22 ENCOUNTER — TELEPHONE (OUTPATIENT)
Dept: FAMILY MEDICINE CLINIC | Age: 53
End: 2018-02-22

## 2018-02-22 ENCOUNTER — CARE COORDINATION (OUTPATIENT)
Dept: CASE MANAGEMENT | Age: 53
End: 2018-02-22

## 2018-02-22 DIAGNOSIS — I25.810 CORONARY ARTERY DISEASE INVOLVING CORONARY BYPASS GRAFT OF NATIVE HEART WITHOUT ANGINA PECTORIS: ICD-10-CM

## 2018-02-22 RX ORDER — ATORVASTATIN CALCIUM 40 MG/1
40 TABLET, FILM COATED ORAL DAILY
Qty: 90 TABLET | Refills: 3 | Status: SHIPPED | OUTPATIENT
Start: 2018-02-22 | End: 2019-06-04

## 2018-02-22 NOTE — TELEPHONE ENCOUNTER
2/22/18  Marylu Cody at [facility], went to resume care with patient and he doesn't want to be homebound any longer. He is requesting order for PT as an OP at [facility]. Please advise.   Ally/pedrito  Dolv: 2/21/18  Donv: 4/25/18

## 2018-02-26 ENCOUNTER — CARE COORDINATOR VISIT (OUTPATIENT)
Dept: CASE MANAGEMENT | Age: 53
End: 2018-02-26

## 2018-02-26 NOTE — CARE COORDINATION
[address] Transitions Follow Up Call    2018    Patient: Medina Villanueva  Patient : 1965   MRN: [id number]  Reason for Admission: There are no discharge diagnoses documented for the most recent discharge. Discharge Date: 18 RARS: Risk Score: 23.75       Spoke with: [facility] Transitions Subsequent and Final Call    Subsequent and Final Calls  Do you have any ongoing symptoms?:  Yes  Onset of Patient-reported symptoms:  Other  Patient-reported symptoms:  Chest Pain  Have your medications changed?:  Yes  Patient Reports:  pt is waiting on the mail order Lipitor  Do you have any questions related to your medications?:  No  Do you currently have any active services?:  Yes  Are you currently active with any services?:  Outpatient/Community Services  Do you have any needs or concerns that I can assist you with?:  No  Identified Barriers:  None  Care Transitions Interventions  No Identified Needs  Other Interventions:        Called pt for the sub transition of care call. Pt stated he still has chest pain at times. Pt stated the pain is \"shooting\" & lasts a few seconds then resolves. Pt denied any sweating, nausea, or SOB with the chest pain. Pt is still C/O feeling very tired, \"no energy\"  [facility], pt stated he will start OP PT. Pt denied any needs. CTC will continue to follow.     Follow Up  Future Appointments  Date Time Provider Tate Hunt   2018 1:40 PM [facility] [place]   3/1/2018 1:00 PM Eunice Martel  [place] [facility] [place]   2018 3:00 PM Fabio Son CNP [facility] [place]   2018 3:15 PM Keshia Garcia NP [facility] [place]   2019 3:30 PM SCHEDULE, SARAH PACER NURSE [facility] - Luis Garcia RN  Care Transition Coordinator  [phone]

## 2018-03-01 ENCOUNTER — OFFICE VISIT (OUTPATIENT)
Dept: CARDIOLOGY CLINIC | Age: 53
End: 2018-03-01
Payer: COMMERCIAL

## 2018-03-01 VITALS
HEART RATE: 69 BPM | SYSTOLIC BLOOD PRESSURE: 134 MMHG | DIASTOLIC BLOOD PRESSURE: 82 MMHG | WEIGHT: 257 LBS | HEIGHT: 74 IN | BODY MASS INDEX: 32.98 KG/M2

## 2018-03-01 DIAGNOSIS — I10 ESSENTIAL HYPERTENSION: ICD-10-CM

## 2018-03-01 DIAGNOSIS — I25.810 CORONARY ARTERY DISEASE INVOLVING CORONARY BYPASS GRAFT OF NATIVE HEART WITHOUT ANGINA PECTORIS: Primary | ICD-10-CM

## 2018-03-01 DIAGNOSIS — Z95.810 ICD (IMPLANTABLE CARDIOVERTER-DEFIBRILLATOR) IN PLACE: ICD-10-CM

## 2018-03-01 DIAGNOSIS — I25.5 ISCHEMIC CARDIOMYOPATHY: ICD-10-CM

## 2018-03-01 DIAGNOSIS — I48.20 CHRONIC ATRIAL FIBRILLATION (HCC): ICD-10-CM

## 2018-03-01 PROCEDURE — 99214 OFFICE O/P EST MOD 30 MIN: CPT | Performed by: NUCLEAR MEDICINE

## 2018-03-01 PROCEDURE — 93000 ELECTROCARDIOGRAM COMPLETE: CPT | Performed by: NUCLEAR MEDICINE

## 2018-03-01 RX ORDER — ALPRAZOLAM 0.25 MG/1
0.25 TABLET ORAL NIGHTLY PRN
COMMUNITY
End: 2018-03-01 | Stop reason: SDUPTHER

## 2018-03-01 RX ORDER — ALPRAZOLAM 0.25 MG/1
0.25 TABLET ORAL PRN
Qty: 30 TABLET | Refills: 0 | Status: SHIPPED | OUTPATIENT
Start: 2018-03-01 | End: 2018-03-05 | Stop reason: SDUPTHER

## 2018-03-01 NOTE — PROGRESS NOTES
[facility]  [address]  [address] [facility] [address]  Dept: [phone]  Dept Fax: [phone]  Loc: [phone]    Visit Date: 3/1/2018    Shaan Garcia is a 46 y.o. male who presents today for:  Chief Complaint   Patient presents with    6 Month Follow-Up     fu stent    Coronary Artery Disease    Hypertension    Cardiomyopathy    Atrial Fibrillation    Shortness of Breath   complicated patient  All different types of symptoms  Chest pain  Dyspnea  Back pain  Nausea  Diarrhea  Fatigue  Dizziness  Not feeling well  Recent PTCA and bare metal stent to the left circ  Known CMP  No ICD shocks  BP is stable   A fib is stable  Known V tach   Known higher BP       HPI:  HPI  Past Medical History:   Diagnosis Date    Acute systolic CHF (congestive heart failure) ([address] 7/16/2015    Alcohol abuse     stopped drinking since 2012    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    [facility] fibrillation [facility]     CAD (coronary artery disease) 3/25/2014    s/p CABG in may 2014    Cardiomyopathy ([address]     Depression     Diabetes mellitus ([address]     Drug abuse     hx of cacaine abuse, stopped abusing drugs in 2012    GERD (gastroesophageal reflux disease)     H/O cardiac catheterization 4/30/2014    Hyperlipidemia     Hypertension     Paroxysmal atrial fibrillation ([address] 7/22/2014    V tach ([facility]     V-tach [facility])     s/p ablation in dec 2014      Past Surgical History:   Procedure Laterality Date    CARD[facility]  [facility]/20/[facility]     [facility]    CARDIAC DEFIBRILLATOR PLACEMENT  10/13/2015    [facility], MRI CONDITIONAL ICD    CORONARY ARTERY BYPASS GRAFT  5-9-14    3 bypass    EKG 12-LEAD  7/17/2015         OTHER SURGICAL HISTORY Left 10/21/14    Left Bursectomy, I & D Left Elbow - Dr. Rody Rudd LAMINECTOMY,>2 [facility] N/A 1/16/2018    LUMBAR AND SACRAL LAMINECTOMY, REMOVAL OF INTRASPINAL TUMORS performed by capsule Take 1 capsule by mouth daily 90 capsule 3    finasteride (PROSCAR) 5 MG tablet Take 1 tab Daily 90 tablet 3    sacubitril-valsartan (ENTRESTO) 49-51 MG per tablet Take 1 tablet by mouth 2 times daily 180 tablet 0    mexiletine (MEXITIL) 150 MG capsule TAKE 2 CAPSULES THREE TIMES A DAY FOR ATRIAL FIBRILLATION 540 capsule 3    furosemide (LASIX) 40 MG tablet TAKE 1 TABLET DAILY FOR TREATMENT WITH DIURETIC THERAPY 90 tablet 3    metoprolol tartrate (LOPRESSOR) 50 MG tablet TAKE 1 TABLET THREE TIMES A DAY FOR ATRIAL FIBRILLATION 270 tablet 3    glucose blood VI test strips (PHIL CONTOUR TEST) strip 1 each by In Vitro route daily 300 each 3    acetaminophen 650 MG TABS Take 650 mg by mouth every 4 hours as needed 120 tablet 3     No current facility-administered medications for this visit.       Allergies   Allergen Reactions    Pcn [Penicillins] Hives    Zoloft [Sertraline Hcl] Anxiety     Worsened anxiety     Health Maintenance   Topic Date Due    Pneumococcal highest risk (1 of 3 - PCV13) 05/20/1984    Colon cancer screen colonoscopy  05/20/2015    Lipid screen  07/17/2016    Diabetic microalbuminuria test  12/15/2016    Diabetic retinal exam  12/15/2016    TSH testing  01/04/2018    Flu vaccine (1) 01/04/2019 (Originally 9/1/2017)    Diabetic foot exam  07/07/2018    A1C test (Diabetic or Prediabetic)  01/23/2019    Potassium monitoring  02/16/2019    Creatinine monitoring  02/16/2019    DTaP/Tdap/Td vaccine (2 - Td) 06/25/2027    Hepatitis C screen  Addressed    HIV screen  Addressed       Subjective:  Review of Systems  General:   No fever, no chills, some fatigue or weight loss  Pulmonary:    some more dyspnea, no wheezing  Cardiac:    Denies recent chest pain,   GI:     No nausea or vomiting, no abdominal pain  Neuro:     No dizziness or light headedness,   Musculoskeletal:  No recent active issues  Extremities:   No edema, good peripheral pulses      Objective:  Physical Exam  BP 134/82   Pulse 69   Ht 6' 2\" (1.88 m)   Wt 257 lb (116.6 kg)   BMI 33.00 kg/m²   General:   Well developed, well nourished  Lungs:    Decreased air entry   Heart:    Normal S1 S2, Slight murmur. no rubs, no gallops  Abdomen:   Soft, non tender, no organomegalies, positive bowel sounds  Extremities:   No edema, no cyanosis, good peripheral pulses  Neurological:   Awake, alert, oriented. No obvious focal deficits  Musculoskelatal:  No obvious deformities    Assessment:     1. Coronary artery disease involving coronary bypass graft of native heart without angina pectoris  EKG 12 Lead   2. Ischemic cardiomyopathy     3. Essential hypertension     4. ICD (implantable cardioverter-defibrillator) in place     5. Chronic atrial fibrillation (HCC)     complicated cardiac patient   Multiple symptoms  Possible Berlinta side effect  ECG in office was done today. I reviewed the ECG. No acute findings      Plan:  No Follow-up on file. Will try plavix   Surgery after 6 weeks  Continue risk factor modification and medical management  Thank you for allowing me to participate in the care of your patient. Please don't hesitate to contact me regarding any further issues related to the patient care    Orders Placed:  Orders Placed This Encounter   Procedures    EKG 12 Lead     Order Specific Question:   Reason for Exam?     Answer: Other       Medications Prescribed:  No orders of the defined types were placed in this encounter. Discussed use, benefit, and side effects of prescribed medications. All patient questions answered. Pt voiced understanding. Instructed to continue current medications, diet and exercise. Continue risk factor modification and medical management. Patient agreed with treatment plan. Follow up as directed.     Electronically signed by Jo Ann Palomino MD on 3/1/2018 at 1:19 PM

## 2018-03-05 ENCOUNTER — TELEPHONE (OUTPATIENT)
Dept: FAMILY MEDICINE CLINIC | Age: 53
End: 2018-03-05

## 2018-03-05 ENCOUNTER — CARE COORDINATION (OUTPATIENT)
Dept: CASE MANAGEMENT | Age: 53
End: 2018-03-05

## 2018-03-05 DIAGNOSIS — F41.3 OTHER MIXED ANXIETY DISORDERS: Primary | ICD-10-CM

## 2018-03-05 RX ORDER — ALPRAZOLAM 0.5 MG/1
0.5 TABLET ORAL DAILY PRN
Qty: 30 TABLET | Refills: 2 | Status: SHIPPED | OUTPATIENT
Start: 2018-03-05 | End: 2018-04-04

## 2018-03-05 NOTE — TELEPHONE ENCOUNTER
Patient is requesting a prescription for Xanax 0.5 mg which is what he was always on. He stated he was given a prescription for 0.25 mg at the hospital but that was not the correct strength. [address] DOLV 2/21/18, DONV 4/25/18.

## 2018-03-06 ENCOUNTER — CARE COORDINATION (OUTPATIENT)
Dept: CASE MANAGEMENT | Age: 53
End: 2018-03-06

## 2018-03-07 ENCOUNTER — PROCEDURE VISIT (OUTPATIENT)
Dept: CARDIOLOGY CLINIC | Age: 53
End: 2018-03-07
Payer: COMMERCIAL

## 2018-03-07 DIAGNOSIS — I50.22 CHRONIC SYSTOLIC CONGESTIVE HEART FAILURE (HCC): Primary | ICD-10-CM

## 2018-03-07 PROCEDURE — 93297 REM INTERROG DEV EVAL ICPMS: CPT | Performed by: INTERNAL MEDICINE

## 2018-03-08 ENCOUNTER — TELEPHONE (OUTPATIENT)
Dept: CARDIOLOGY CLINIC | Age: 53
End: 2018-03-08

## 2018-03-13 LAB — MISC REFERENCE: NORMAL

## 2018-03-14 ENCOUNTER — OFFICE VISIT (OUTPATIENT)
Dept: CARDIOLOGY CLINIC | Age: 53
End: 2018-03-14
Payer: COMMERCIAL

## 2018-03-14 ENCOUNTER — HOSPITAL ENCOUNTER (OUTPATIENT)
Dept: GENERAL RADIOLOGY | Age: 53
Discharge: HOME | End: 2018-03-14
Payer: COMMERCIAL

## 2018-03-14 ENCOUNTER — HOSPITAL ENCOUNTER (OUTPATIENT)
Age: 53
Discharge: HOME | End: 2018-03-14
Payer: COMMERCIAL

## 2018-03-14 VITALS
HEART RATE: 57 BPM | BODY MASS INDEX: 34.52 KG/M2 | SYSTOLIC BLOOD PRESSURE: 146 MMHG | DIASTOLIC BLOOD PRESSURE: 86 MMHG | WEIGHT: 268.96 LBS | HEIGHT: 74 IN

## 2018-03-14 DIAGNOSIS — I50.42 CHF (CONGESTIVE HEART FAILURE), NYHA CLASS III, CHRONIC, COMBINED (HCC): ICD-10-CM

## 2018-03-14 DIAGNOSIS — D49.2 LUMBAR SPINE TUMOR: Primary | ICD-10-CM

## 2018-03-14 DIAGNOSIS — Z98.890 STATUS POST LUMBAR SURGERY: ICD-10-CM

## 2018-03-14 DIAGNOSIS — I48.0 PAROXYSMAL ATRIAL FIBRILLATION (HCC): Primary | ICD-10-CM

## 2018-03-14 LAB
ANION GAP SERPL CALCULATED.3IONS-SCNC: 12 MEQ/L (ref 8–16)
BUN BLDV-MCNC: 20 MG/DL (ref 7–22)
CALCIUM SERPL-MCNC: 9.4 MG/DL (ref 8.5–10.5)
CHLORIDE BLD-SCNC: 103 MEQ/L (ref 98–111)
CO2: 26 MEQ/L (ref 23–33)
CREAT SERPL-MCNC: 1.2 MG/DL (ref 0.4–1.2)
GFR SERPL CREATININE-BSD FRML MDRD: 63 ML/MIN/1.73M2
GLUCOSE BLD-MCNC: 148 MG/DL (ref 70–108)
POTASSIUM SERPL-SCNC: 4.2 MEQ/L (ref 3.5–5.2)
PRO-BNP: 1312 PG/ML (ref 0–900)
SODIUM BLD-SCNC: 141 MEQ/L (ref 135–145)

## 2018-03-14 PROCEDURE — 83880 ASSAY OF NATRIURETIC PEPTIDE: CPT

## 2018-03-14 PROCEDURE — 36415 COLL VENOUS BLD VENIPUNCTURE: CPT

## 2018-03-14 PROCEDURE — 80048 BASIC METABOLIC PNL TOTAL CA: CPT

## 2018-03-14 PROCEDURE — 99213 OFFICE O/P EST LOW 20 MIN: CPT | Performed by: NURSE PRACTITIONER

## 2018-03-14 PROCEDURE — 71046 X-RAY EXAM CHEST 2 VIEWS: CPT

## 2018-03-14 PROCEDURE — 93000 ELECTROCARDIOGRAM COMPLETE: CPT | Performed by: NURSE PRACTITIONER

## 2018-03-14 RX ORDER — NITROGLYCERIN 0.4 MG/1
0.4 TABLET SUBLINGUAL EVERY 5 MIN PRN
Qty: 25 TABLET | Refills: 3 | Status: SHIPPED | OUTPATIENT
Start: 2018-03-14 | End: 2022-07-20

## 2018-03-14 ASSESSMENT — ENCOUNTER SYMPTOMS
APNEA: 0
WHEEZING: 0
SHORTNESS OF BREATH: 1
COUGH: 0
CHEST TIGHTNESS: 0
COLOR CHANGE: 0
ABDOMINAL DISTENTION: 0
ABDOMINAL PAIN: 0
NAUSEA: 0

## 2018-03-14 NOTE — PATIENT INSTRUCTIONS
Continue:  · Continue current medications  · Daily weights and record  · Fluid restriction of 2 Liters per day  · Limit sodium in diet to around 3498-1268 mg/day  · Monitor BP  · Activity as tolerated     Call the Heart Failure Clinic for any of the following symptoms: [phone]  Weight gain of 2-3 pounds in 1 day or 5 pounds in 1 week  Increased shortness of breath  Shortness of breath while laying down  Cough  Chest pain  Swelling in feet, ankles or legs  Tenderness or bloating in the abdomen  Fatigue   Decreased appetite or nausea   Confusion

## 2018-03-14 NOTE — PROGRESS NOTES
Heart Failure Clinic       Visit Date: 3/14/2018  Cardiologist:  Dr. Fer Wells  Primary Care Physician: Dr. Ricardo Meehan, NP    Meriel Primrose is a 46 y.o. male who presents today for:  Chief Complaint   Patient presents with    Congestive Heart Failure       HPI:   Meriel Primrose is a 46 y.o. male who presents to the office for a follow up in the heart failure clinic. He has had SOB and some chest pressure since he had stents placed 2/16/18. He is on Brilinta. He is also awaiting a MRI for a back tumor. He had once removed that he states was cancerous. He states he has been more fatigued. He is SOB with minimal activity. He has anxiety issues, he is worried about the tumor. He has not been able to work since Dec which is also a stressor for him. He has not changed his diet. Fluid intake is the same. He denies any fever or orthopnea.      Patient has:  Last hospital admission related to Heart Failure:  Jan 2018  Chest Pain: no  Worsening SOB/orthopnea/PND: yes  Edema: no  Any extra diuretic use since last visit: yes see chart  Weight gain: yes see hpi  Compliant checking home weight: yes  Fatigue: yes  Abdominal bloating: no  Appetite: good  Difficulty sleeping: yes waking up frequently  Cough: no  Compliant checking blood pressure: no  Any refills on CHF medications needed at this time: no    Past Medical History:   Diagnosis Date    Acute systolic CHF (congestive heart failure) ([address] 7/16/2015    Alcohol abuse     stopped drinking since 2012    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 Negative for apnea, cough, chest tightness and wheezing. Cardiovascular: Positive for chest pain. Negative for palpitations and leg swelling. Gastrointestinal: Negative for abdominal distention, abdominal pain and nausea. Genitourinary: Negative for difficulty urinating and dysuria. Musculoskeletal: Negative for arthralgias and gait problem. Skin: Negative for color change. Neurological: Negative for dizziness, numbness and headaches. Psychiatric/Behavioral: Negative for agitation, confusion and sleep disturbance. The patient is not nervous/anxious. OBJECTIVE:   Today's Vitals:  BP (!) 146/86   Pulse 57   Ht 6' 2\" (1.88 m)   Wt 268 lb 15.4 oz (122 kg)   BMI 34.53 kg/m²     Physical Exam   Constitutional: He is oriented to person, place, and time. He appears well-developed and well-nourished. HENT:   Head: Normocephalic and atraumatic. Eyes: Pupils are equal, round, and reactive to light. Neck: Normal range of motion. No JVD present. Cardiovascular: Normal rate and normal heart sounds. No murmur heard. +ICD   Pulmonary/Chest: Effort normal. No respiratory distress. He has no rales. Abdominal: Soft. He exhibits no distension. There is no tenderness. Musculoskeletal: He exhibits no edema or tenderness. Neurological: He is alert and oriented to person, place, and time. Skin: Skin is warm and dry. Psychiatric: He has a normal mood and affect. His behavior is normal.   Vitals reviewed. Wt Readings from Last 3 Encounters:   03/14/18 268 lb 15.4 oz (122 kg)   03/01/18 257 lb (116.6 kg)   02/21/18 265 lb 6.4 oz (120.4 kg)     BP Readings from Last 3 Encounters:   03/14/18 (!) 146/86   03/01/18 134/82   02/21/18 134/72     Pulse Readings from Last 3 Encounters:   03/14/18 57   03/01/18 69   02/21/18 54     Body mass index is 34.53 kg/m².     ECHO:   12/21/17   Summary   Left Ventricular size is Mildly increased .   Normal left ventricular wall thickness.   There was moderate global hypokinesis of the left ventricle.   Systolic function was moderately reduced.   Ejection fraction is visually estimated at 40%.  Doppler parameters were consistent with abnormal left ventricular   relaxation (grade 1 diastolic dysfunction).   The left atrium is Moderately dilated.   Pacer Wire visualized in right ventricle.      Signature      ----------------------------------------------------------------   Electronically signed by Ambrose Morse MD (Interpreting   physician) on 12/21/2017 at 05:39 PM   ----------------------------------------------------------------      Findings      Mitral Valve   The mitral valve structure was normal with normal leaflet separation.   DOPPLER: The transmitral velocity was within the normal range with no   evidence for mitral stenosis. Mild mitral regurgitation is present.      Aortic Valve   The aortic valve was trileaflet with normal thickness and cuspal   separation. DOPPLER: Transaortic velocity was within the normal range with   no evidence of aortic stenosis. There was no evidence of aortic   regurgitation.      Tricuspid Valve   The tricuspid valve structure was normal with normal leaflet separation.   DOPPLER: There was no evidence of tricuspid stenosis.   Mild tricuspid regurgitation visualized.      Pulmonic Valve   The pulmonic valve leaflets exhibited normal thickness, no calcification,   and normal cuspal separation. DOPPLER: The transpulmonic velocity was   within the normal range with no evidence for regurgitation.      Left Atrium   The left atrium is Moderately dilated.      Left Ventricle   Left Ventricular size is Mildly increased .   Normal left ventricular wall thickness.   There was moderate global hypokinesis of the left ventricle.   Systolic function was moderately reduced.   Ejection fraction is visually estimated at 40%.    Doppler parameters were consistent with abnormal left

## 2018-03-15 ENCOUNTER — TELEPHONE (OUTPATIENT)
Dept: NEUROSURGERY | Age: 53
End: 2018-03-15

## 2018-03-15 ENCOUNTER — HOSPITAL ENCOUNTER (OUTPATIENT)
Dept: PHARMACY | Age: 53
Setting detail: THERAPIES SERIES
Discharge: HOME | End: 2018-03-15
Payer: COMMERCIAL

## 2018-03-15 ENCOUNTER — TELEPHONE (OUTPATIENT)
Dept: CARDIOLOGY CLINIC | Age: 53
End: 2018-03-15

## 2018-03-15 DIAGNOSIS — I48.0 PAROXYSMAL ATRIAL FIBRILLATION (HCC): ICD-10-CM

## 2018-03-15 LAB
HCT VFR BLD CALC: 38.2 % (ref 42–52)
HEMOGLOBIN: 13 GM/DL (ref 14–18)
INR BLD: 4.23 (ref 0.85–1.13)

## 2018-03-15 PROCEDURE — 85610 PROTHROMBIN TIME: CPT | Performed by: PHARMACIST

## 2018-03-15 PROCEDURE — 85018 HEMOGLOBIN: CPT

## 2018-03-15 PROCEDURE — 85014 HEMATOCRIT: CPT

## 2018-03-15 PROCEDURE — 36416 COLLJ CAPILLARY BLOOD SPEC: CPT | Performed by: PHARMACIST

## 2018-03-15 PROCEDURE — 85610 PROTHROMBIN TIME: CPT

## 2018-03-15 PROCEDURE — 99211 OFF/OP EST MAY X REQ PHY/QHP: CPT | Performed by: PHARMACIST

## 2018-03-17 RX ORDER — SACUBITRIL AND VALSARTAN 49; 51 MG/1; MG/1
TABLET, FILM COATED ORAL
Qty: 180 TABLET | Refills: 2 | Status: ON HOLD | OUTPATIENT
Start: 2018-03-17 | End: 2018-05-19

## 2018-03-19 RX ORDER — ISOSORBIDE MONONITRATE 30 MG/1
30 TABLET, EXTENDED RELEASE ORAL DAILY
Qty: 30 TABLET | Refills: 0 | OUTPATIENT
Start: 2018-03-19

## 2018-03-19 RX ORDER — ISOSORBIDE MONONITRATE 30 MG/1
30 TABLET, EXTENDED RELEASE ORAL DAILY
Qty: 30 TABLET | Refills: 6 | Status: SHIPPED | OUTPATIENT
Start: 2018-03-19 | End: 2018-04-23 | Stop reason: SDUPTHER

## 2018-03-19 NOTE — TELEPHONE ENCOUNTER
Katie Steve called requesting a refill on the following medications:  Requested Prescriptions     Pending Prescriptions Disp Refills    ticagrelor (BRILINTA) 90 MG TABS tablet 60 tablet 6     Sig: Take 1 tablet by mouth 2 times daily    isosorbide mononitrate (IMDUR) 30 MG extended release tablet 30 tablet 0     Sig: Take 1 tablet by mouth daily     Pharmacy verified:  .pv      Date of last visit: 3/1/2018  Date of next visit (if applicable): 5/72/2462

## 2018-03-22 ENCOUNTER — HOSPITAL ENCOUNTER (OUTPATIENT)
Dept: PHARMACY | Age: 53
Setting detail: THERAPIES SERIES
Discharge: HOME | End: 2018-03-22
Payer: COMMERCIAL

## 2018-03-22 DIAGNOSIS — I48.0 PAROXYSMAL ATRIAL FIBRILLATION (HCC): ICD-10-CM

## 2018-03-22 LAB — POC INR: 1.9 (ref 0.8–1.2)

## 2018-03-22 PROCEDURE — 99211 OFF/OP EST MAY X REQ PHY/QHP: CPT | Performed by: PHARMACIST

## 2018-03-22 PROCEDURE — 36416 COLLJ CAPILLARY BLOOD SPEC: CPT | Performed by: PHARMACIST

## 2018-03-22 PROCEDURE — 85610 PROTHROMBIN TIME: CPT | Performed by: PHARMACIST

## 2018-03-22 NOTE — PROGRESS NOTES
The [facility] [facility]  [phone] (phone)  [phone] (fax)  Mr. Shaan Garcia is a 46 y.o.  male with history of Afib who presents today for anticoagulation monitoring and adjustment. Patient verifies current dosing regimen and tablet strength. No missed or extra doses. Patient denies s/s bleeding/bruising/swelling/SOB/chest pain  No blood in urine or stool. No dietary changes. No changes in medication/OTC agents/Herbals. No change in alcohol use or tobacco use. No change in activity level. Patient denies headaches/dizziness/lightheadedness/falls. No vomiting/diarrhea or acute illness. No Procedures scheduled in the future at this time. MRI and CT scan on 4/2 to assess when to schedule back surgery. Assessment:   Lab Results   Component Value Date    INR 1.90 (H) 03/22/2018    INR 4.23 (H) 03/15/2018    INR 3.90 (H) 02/20/2018     INR subtherapeutic   Recent Labs      03/22/18   1052   INR  1.90*         Plan:  Continue Coumadin 5mg W, 2.5mg MTTHFSS. Recheck INR 2 weeks. Patient reminded to call the Anticoagulation Clinic with any signs or symptoms of bleeding or with any medication changes. Patient given instructions utilizing the teach back method. Discharged ambulatory in no apparent distress. After visit summary printed and reviewed with patient.       Medications reviewed and updated on home medication list Yes    Influenza vaccine:     [] given    [x] declined   [] received previously   [] plans to receive at a later time   [x] refused    [] documented in EPIC

## 2018-04-02 ENCOUNTER — HOSPITAL ENCOUNTER (OUTPATIENT)
Dept: PHARMACY | Age: 53
Setting detail: THERAPIES SERIES
Discharge: HOME | End: 2018-04-02
Payer: COMMERCIAL

## 2018-04-02 DIAGNOSIS — I48.0 PAROXYSMAL ATRIAL FIBRILLATION (HCC): ICD-10-CM

## 2018-04-02 LAB — POC INR: 2.3 (ref 0.8–1.2)

## 2018-04-02 PROCEDURE — 85610 PROTHROMBIN TIME: CPT

## 2018-04-02 PROCEDURE — 99211 OFF/OP EST MAY X REQ PHY/QHP: CPT

## 2018-04-02 PROCEDURE — 36416 COLLJ CAPILLARY BLOOD SPEC: CPT

## 2018-04-03 ENCOUNTER — TELEPHONE (OUTPATIENT)
Dept: NEUROSURGERY | Age: 53
End: 2018-04-03

## 2018-04-03 DIAGNOSIS — Z01.818 PRE-OP TESTING: ICD-10-CM

## 2018-04-03 DIAGNOSIS — D49.2 LUMBAR SPINE TUMOR: ICD-10-CM

## 2018-04-03 DIAGNOSIS — D33.4 SCHWANNOMA OF SPINAL CORD (HCC): Primary | ICD-10-CM

## 2018-04-11 ENCOUNTER — PROCEDURE VISIT (OUTPATIENT)
Dept: CARDIOLOGY CLINIC | Age: 53
End: 2018-04-11
Payer: COMMERCIAL

## 2018-04-11 DIAGNOSIS — I50.22 CHRONIC SYSTOLIC CONGESTIVE HEART FAILURE (HCC): Primary | ICD-10-CM

## 2018-04-11 DIAGNOSIS — Z95.810 S/P ICD (INTERNAL CARDIAC DEFIBRILLATOR) PROCEDURE: ICD-10-CM

## 2018-04-11 PROCEDURE — 93298 REM INTERROG DEV EVAL SCRMS: CPT | Performed by: NUCLEAR MEDICINE

## 2018-04-16 ENCOUNTER — TELEPHONE (OUTPATIENT)
Dept: NEUROSURGERY | Age: 53
End: 2018-04-16

## 2018-04-17 ENCOUNTER — HOSPITAL ENCOUNTER (OUTPATIENT)
Dept: CT IMAGING | Age: 53
Discharge: HOME | End: 2018-04-17
Payer: COMMERCIAL

## 2018-04-17 ENCOUNTER — TELEPHONE (OUTPATIENT)
Dept: CARDIOLOGY CLINIC | Age: 53
End: 2018-04-17

## 2018-04-17 ENCOUNTER — OFFICE VISIT (OUTPATIENT)
Dept: NEUROSURGERY | Age: 53
End: 2018-04-17
Payer: COMMERCIAL

## 2018-04-17 ENCOUNTER — HOSPITAL ENCOUNTER (OUTPATIENT)
Dept: MRI IMAGING | Age: 53
Discharge: HOME | End: 2018-04-17
Payer: COMMERCIAL

## 2018-04-17 ENCOUNTER — HOSPITAL ENCOUNTER (OUTPATIENT)
Dept: PHARMACY | Age: 53
Setting detail: THERAPIES SERIES
Discharge: HOME | End: 2018-04-17
Payer: COMMERCIAL

## 2018-04-17 VITALS
BODY MASS INDEX: 35.29 KG/M2 | DIASTOLIC BLOOD PRESSURE: 90 MMHG | HEART RATE: 64 BPM | WEIGHT: 275 LBS | HEIGHT: 74 IN | SYSTOLIC BLOOD PRESSURE: 162 MMHG

## 2018-04-17 VITALS — RESPIRATION RATE: 20 BRPM | HEART RATE: 80 BPM | OXYGEN SATURATION: 98 %

## 2018-04-17 DIAGNOSIS — I48.0 PAROXYSMAL ATRIAL FIBRILLATION (HCC): ICD-10-CM

## 2018-04-17 DIAGNOSIS — D33.4 SCHWANNOMA OF SPINAL CORD (HCC): ICD-10-CM

## 2018-04-17 DIAGNOSIS — Z98.890 STATUS POST LUMBAR SURGERY: ICD-10-CM

## 2018-04-17 DIAGNOSIS — D49.2 LUMBAR SPINE TUMOR: ICD-10-CM

## 2018-04-17 DIAGNOSIS — D33.4 SCHWANNOMA OF SPINAL CORD (HCC): Primary | ICD-10-CM

## 2018-04-17 LAB — POC INR: 2.2 (ref 0.8–1.2)

## 2018-04-17 PROCEDURE — 85610 PROTHROMBIN TIME: CPT | Performed by: PHARMACIST

## 2018-04-17 PROCEDURE — 99211 OFF/OP EST MAY X REQ PHY/QHP: CPT | Performed by: PHARMACIST

## 2018-04-17 PROCEDURE — 6360000004 HC RX CONTRAST MEDICATION: Performed by: NURSE PRACTITIONER

## 2018-04-17 PROCEDURE — 72158 MRI LUMBAR SPINE W/O & W/DYE: CPT

## 2018-04-17 PROCEDURE — A9579 GAD-BASE MR CONTRAST NOS,1ML: HCPCS | Performed by: NURSE PRACTITIONER

## 2018-04-17 PROCEDURE — 72156 MRI NECK SPINE W/O & W/DYE: CPT

## 2018-04-17 PROCEDURE — 72157 MRI CHEST SPINE W/O & W/DYE: CPT

## 2018-04-17 PROCEDURE — 36416 COLLJ CAPILLARY BLOOD SPEC: CPT | Performed by: PHARMACIST

## 2018-04-17 PROCEDURE — 72197 MRI PELVIS W/O & W/DYE: CPT

## 2018-04-17 PROCEDURE — 99213 OFFICE O/P EST LOW 20 MIN: CPT | Performed by: NURSE PRACTITIONER

## 2018-04-17 RX ADMIN — GADOTERIDOL 20 ML: 279.3 INJECTION, SOLUTION INTRAVENOUS at 16:01

## 2018-04-17 ASSESSMENT — ENCOUNTER SYMPTOMS
COUGH: 0
SINUS PAIN: 0
ABDOMINAL PAIN: 0
EYE PAIN: 0
WHEEZING: 0
BACK PAIN: 1
COLOR CHANGE: 0
VOMITING: 0
TROUBLE SWALLOWING: 0
NAUSEA: 0
SORE THROAT: 0
SHORTNESS OF BREATH: 0
FACIAL SWELLING: 0
DIARRHEA: 0

## 2018-04-20 ENCOUNTER — CLINICAL DOCUMENTATION (OUTPATIENT)
Dept: NEUROSURGERY | Age: 53
End: 2018-04-20

## 2018-04-23 ENCOUNTER — OFFICE VISIT (OUTPATIENT)
Dept: CARDIOLOGY CLINIC | Age: 53
End: 2018-04-23
Payer: COMMERCIAL

## 2018-04-23 VITALS
DIASTOLIC BLOOD PRESSURE: 88 MMHG | WEIGHT: 277 LBS | SYSTOLIC BLOOD PRESSURE: 158 MMHG | OXYGEN SATURATION: 95 % | HEIGHT: 74 IN | BODY MASS INDEX: 35.55 KG/M2 | HEART RATE: 54 BPM

## 2018-04-23 DIAGNOSIS — I50.42 CHF (CONGESTIVE HEART FAILURE), NYHA CLASS II, CHRONIC, COMBINED (HCC): Primary | ICD-10-CM

## 2018-04-23 PROCEDURE — 99213 OFFICE O/P EST LOW 20 MIN: CPT | Performed by: NURSE PRACTITIONER

## 2018-04-23 RX ORDER — ISOSORBIDE MONONITRATE 60 MG/1
60 TABLET, EXTENDED RELEASE ORAL DAILY
Qty: 90 TABLET | Refills: 2 | Status: SHIPPED | OUTPATIENT
Start: 2018-04-23 | End: 2018-12-13 | Stop reason: SDUPTHER

## 2018-04-23 RX ORDER — NICOTINE 21 MG/24HR
1 PATCH, TRANSDERMAL 24 HOURS TRANSDERMAL EVERY 24 HOURS
Qty: 30 PATCH | Refills: 0 | Status: SHIPPED | OUTPATIENT
Start: 2018-04-23 | End: 2019-02-03

## 2018-04-23 ASSESSMENT — ENCOUNTER SYMPTOMS
ABDOMINAL PAIN: 0
APNEA: 0
ABDOMINAL DISTENTION: 0
COLOR CHANGE: 0
COUGH: 0
SHORTNESS OF BREATH: 1
NAUSEA: 0
WHEEZING: 0
CHEST TIGHTNESS: 0

## 2018-05-01 ENCOUNTER — HOSPITAL ENCOUNTER (EMERGENCY)
Age: 53
Discharge: HOME | End: 2018-05-01
Attending: FAMILY MEDICINE
Payer: COMMERCIAL

## 2018-05-01 ENCOUNTER — NURSE TRIAGE (OUTPATIENT)
Dept: ADMINISTRATIVE | Age: 53
End: 2018-05-01

## 2018-05-01 ENCOUNTER — APPOINTMENT (OUTPATIENT)
Dept: ULTRASOUND IMAGING | Age: 53
End: 2018-05-01
Payer: COMMERCIAL

## 2018-05-01 VITALS
HEART RATE: 64 BPM | SYSTOLIC BLOOD PRESSURE: 173 MMHG | OXYGEN SATURATION: 96 % | DIASTOLIC BLOOD PRESSURE: 84 MMHG | TEMPERATURE: 98.7 F | RESPIRATION RATE: 16 BRPM

## 2018-05-01 DIAGNOSIS — N45.1 EPIDIDYMITIS: Primary | ICD-10-CM

## 2018-05-01 LAB
BACTERIA: ABNORMAL /HPF
BILIRUBIN URINE: NEGATIVE
BLOOD, URINE: NEGATIVE
CASTS 2: ABNORMAL /LPF
CASTS UA: ABNORMAL /LPF
CHARACTER, URINE: ABNORMAL
CHLAMYDIA TRACHOMATIS BY RT-PCR: NOT DETECTED
COLOR: YELLOW
CRYSTALS, UA: ABNORMAL
CT/NG SOURCE: NORMAL
EPITHELIAL CELLS, UA: ABNORMAL /HPF
GLUCOSE URINE: 100 MG/DL
KETONES, URINE: NEGATIVE
LEUKOCYTE ESTERASE, URINE: ABNORMAL
MISCELLANEOUS 2: ABNORMAL
NEISSERIA GONORRHOEAE BY RT-PCR: NOT DETECTED
NITRITE, URINE: NEGATIVE
PH UA: 5.5
PROTEIN UA: 300
RBC URINE: ABNORMAL /HPF
RENAL EPITHELIAL, UA: ABNORMAL
SPECIFIC GRAVITY, URINE: 1.02 (ref 1–1.03)
UROBILINOGEN, URINE: 0.2 EU/DL
WBC UA: > 200 /HPF
YEAST: ABNORMAL

## 2018-05-01 PROCEDURE — 76870 US EXAM SCROTUM: CPT

## 2018-05-01 PROCEDURE — 87491 CHLMYD TRACH DNA AMP PROBE: CPT

## 2018-05-01 PROCEDURE — 81001 URINALYSIS AUTO W/SCOPE: CPT

## 2018-05-01 PROCEDURE — 87184 SC STD DISK METHOD PER PLATE: CPT

## 2018-05-01 PROCEDURE — 87086 URINE CULTURE/COLONY COUNT: CPT

## 2018-05-01 PROCEDURE — 87591 N.GONORRHOEAE DNA AMP PROB: CPT

## 2018-05-01 PROCEDURE — 87186 SC STD MICRODIL/AGAR DIL: CPT

## 2018-05-01 PROCEDURE — 87077 CULTURE AEROBIC IDENTIFY: CPT

## 2018-05-01 PROCEDURE — 6370000000 HC RX 637 (ALT 250 FOR IP): Performed by: FAMILY MEDICINE

## 2018-05-01 PROCEDURE — 99284 EMERGENCY DEPT VISIT MOD MDM: CPT

## 2018-05-01 RX ORDER — OFLOXACIN 300 MG/1
300 TABLET, COATED ORAL 2 TIMES DAILY
Qty: 20 TABLET | Refills: 0 | Status: SHIPPED | OUTPATIENT
Start: 2018-05-01 | End: 2018-05-11

## 2018-05-01 RX ORDER — HYDROCODONE BITARTRATE AND ACETAMINOPHEN 5; 325 MG/1; MG/1
1 TABLET ORAL ONCE
Status: COMPLETED | OUTPATIENT
Start: 2018-05-01 | End: 2018-05-01

## 2018-05-01 RX ORDER — HYDROCODONE BITARTRATE AND ACETAMINOPHEN 5; 325 MG/1; MG/1
1 TABLET ORAL EVERY 6 HOURS PRN
Qty: 6 TABLET | Refills: 0 | Status: SHIPPED | OUTPATIENT
Start: 2018-05-01 | End: 2018-05-03

## 2018-05-01 RX ADMIN — HYDROCODONE BITARTRATE AND ACETAMINOPHEN 1 TABLET: 5; 325 TABLET ORAL at 17:42

## 2018-05-01 ASSESSMENT — ENCOUNTER SYMPTOMS
DIARRHEA: 0
SHORTNESS OF BREATH: 0
NAUSEA: 0
COLOR CHANGE: 0
ABDOMINAL PAIN: 0
COUGH: 0
VOMITING: 0

## 2018-05-01 ASSESSMENT — PAIN DESCRIPTION - PAIN TYPE
TYPE: ACUTE PAIN
TYPE: ACUTE PAIN

## 2018-05-01 ASSESSMENT — PAIN DESCRIPTION - ORIENTATION: ORIENTATION: LEFT

## 2018-05-01 ASSESSMENT — PAIN DESCRIPTION - DESCRIPTORS: DESCRIPTORS: SHARP

## 2018-05-01 ASSESSMENT — PAIN DESCRIPTION - LOCATION
LOCATION: SCROTUM
LOCATION: SCROTUM

## 2018-05-01 ASSESSMENT — PAIN SCALES - GENERAL
PAINLEVEL_OUTOF10: 8
PAINLEVEL_OUTOF10: 8
PAINLEVEL_OUTOF10: 6

## 2018-05-02 ENCOUNTER — TELEPHONE (OUTPATIENT)
Dept: FAMILY MEDICINE CLINIC | Age: 53
End: 2018-05-02

## 2018-05-02 ENCOUNTER — HOSPITAL ENCOUNTER (OUTPATIENT)
Age: 53
Discharge: HOME | End: 2018-05-02
Payer: COMMERCIAL

## 2018-05-02 PROCEDURE — 36415 COLL VENOUS BLD VENIPUNCTURE: CPT

## 2018-05-02 PROCEDURE — 86301 IMMUNOASSAY TUMOR CA 19-9: CPT

## 2018-05-03 LAB
CA 19-9: 32 U/ML (ref 0–35)
ORGANISM: ABNORMAL
URINE CULTURE REFLEX: ABNORMAL

## 2018-05-09 ENCOUNTER — OFFICE VISIT (OUTPATIENT)
Dept: FAMILY MEDICINE CLINIC | Age: 53
End: 2018-05-09
Payer: COMMERCIAL

## 2018-05-09 VITALS
RESPIRATION RATE: 20 BRPM | SYSTOLIC BLOOD PRESSURE: 130 MMHG | HEART RATE: 64 BPM | BODY MASS INDEX: 36.73 KG/M2 | OXYGEN SATURATION: 98 % | DIASTOLIC BLOOD PRESSURE: 76 MMHG | WEIGHT: 271.2 LBS | HEIGHT: 72 IN

## 2018-05-09 DIAGNOSIS — E78.2 MIXED HYPERLIPIDEMIA: ICD-10-CM

## 2018-05-09 DIAGNOSIS — I48.0 PAROXYSMAL ATRIAL FIBRILLATION (HCC): ICD-10-CM

## 2018-05-09 DIAGNOSIS — I10 ESSENTIAL HYPERTENSION: ICD-10-CM

## 2018-05-09 DIAGNOSIS — Z79.01 ANTICOAGULATED ON COUMADIN: ICD-10-CM

## 2018-05-09 DIAGNOSIS — E78.5 HYPERLIPIDEMIA, UNSPECIFIED HYPERLIPIDEMIA TYPE: ICD-10-CM

## 2018-05-09 DIAGNOSIS — R40.0 DAYTIME SOMNOLENCE: ICD-10-CM

## 2018-05-09 DIAGNOSIS — E11.22 CONTROLLED TYPE 2 DIABETES MELLITUS WITH CHRONIC KIDNEY DISEASE, WITHOUT LONG-TERM CURRENT USE OF INSULIN, UNSPECIFIED CKD STAGE (HCC): ICD-10-CM

## 2018-05-09 DIAGNOSIS — R06.81 APNEIC SPELL: ICD-10-CM

## 2018-05-09 DIAGNOSIS — I25.810 CORONARY ARTERY DISEASE INVOLVING CORONARY BYPASS GRAFT OF NATIVE HEART WITHOUT ANGINA PECTORIS: ICD-10-CM

## 2018-05-09 DIAGNOSIS — I50.22 CHRONIC SYSTOLIC CONGESTIVE HEART FAILURE (HCC): ICD-10-CM

## 2018-05-09 DIAGNOSIS — F41.3 OTHER MIXED ANXIETY DISORDERS: ICD-10-CM

## 2018-05-09 DIAGNOSIS — N45.1 EPIDIDYMITIS: Primary | ICD-10-CM

## 2018-05-09 DIAGNOSIS — Z72.0 TOBACCO ABUSE: ICD-10-CM

## 2018-05-09 DIAGNOSIS — N43.3 HYDROCELE, LEFT: ICD-10-CM

## 2018-05-09 DIAGNOSIS — N34.2 INFECTIVE URETHRITIS: ICD-10-CM

## 2018-05-09 DIAGNOSIS — Z12.5 SCREENING PSA (PROSTATE SPECIFIC ANTIGEN): ICD-10-CM

## 2018-05-09 PROCEDURE — 99215 OFFICE O/P EST HI 40 MIN: CPT | Performed by: NURSE PRACTITIONER

## 2018-05-09 RX ORDER — ALPRAZOLAM 0.5 MG/1
TABLET ORAL
Qty: 30 TABLET | Refills: 5 | Status: ON HOLD | OUTPATIENT
Start: 2018-05-09 | End: 2018-05-19

## 2018-05-09 RX ORDER — ALPRAZOLAM 0.5 MG/1
TABLET ORAL
Refills: 2 | COMMUNITY
Start: 2018-05-06 | End: 2018-05-09 | Stop reason: SDUPTHER

## 2018-05-09 RX ORDER — SULFAMETHOXAZOLE AND TRIMETHOPRIM 800; 160 MG/1; MG/1
1 TABLET ORAL 2 TIMES DAILY
Qty: 20 TABLET | Refills: 0 | Status: ON HOLD | OUTPATIENT
Start: 2018-05-09 | End: 2018-05-19 | Stop reason: HOSPADM

## 2018-05-09 ASSESSMENT — ENCOUNTER SYMPTOMS
COUGH: 0
BACK PAIN: 1
ABDOMINAL PAIN: 0
SHORTNESS OF BREATH: 0
NAUSEA: 0

## 2018-05-10 ENCOUNTER — TELEPHONE (OUTPATIENT)
Dept: CARDIOLOGY CLINIC | Age: 53
End: 2018-05-10

## 2018-05-11 ENCOUNTER — PROCEDURE VISIT (OUTPATIENT)
Dept: CARDIOLOGY CLINIC | Age: 53
End: 2018-05-11
Payer: COMMERCIAL

## 2018-05-11 DIAGNOSIS — Z95.810 S/P ICD (INTERNAL CARDIAC DEFIBRILLATOR) PROCEDURE: Primary | ICD-10-CM

## 2018-05-11 PROCEDURE — 93296 REM INTERROG EVL PM/IDS: CPT | Performed by: INTERNAL MEDICINE

## 2018-05-11 PROCEDURE — 93295 DEV INTERROG REMOTE 1/2/MLT: CPT | Performed by: INTERNAL MEDICINE

## 2018-05-14 ENCOUNTER — HOSPITAL ENCOUNTER (OUTPATIENT)
Age: 53
Discharge: HOME | End: 2018-05-14
Payer: COMMERCIAL

## 2018-05-14 DIAGNOSIS — E78.5 HYPERLIPIDEMIA, UNSPECIFIED HYPERLIPIDEMIA TYPE: ICD-10-CM

## 2018-05-14 DIAGNOSIS — Z12.5 SCREENING PSA (PROSTATE SPECIFIC ANTIGEN): ICD-10-CM

## 2018-05-14 DIAGNOSIS — E11.22 CONTROLLED TYPE 2 DIABETES MELLITUS WITH CHRONIC KIDNEY DISEASE, WITHOUT LONG-TERM CURRENT USE OF INSULIN, UNSPECIFIED CKD STAGE (HCC): ICD-10-CM

## 2018-05-14 LAB
AVERAGE GLUCOSE: 144 MG/DL (ref 70–126)
CHOLESTEROL, TOTAL: 108 MG/DL (ref 100–199)
CREATININE, URINE: 48.3 MG/DL
HBA1C MFR BLD: 6.8 % (ref 4.4–6.4)
HCT VFR BLD CALC: 38.3 % (ref 42–52)
HDLC SERPL-MCNC: 33 MG/DL
HEMOGLOBIN: 12.9 GM/DL (ref 14–18)
LDL CHOLESTEROL CALCULATED: 49 MG/DL
MCH RBC QN AUTO: 32.2 PG (ref 27–31)
MCHC RBC AUTO-ENTMCNC: 33.8 GM/DL (ref 33–37)
MCV RBC AUTO: 95.4 FL (ref 80–94)
MICROALBUMIN UR-MCNC: 95.76 MG/DL
MICROALBUMIN/CREAT UR-RTO: 1983 MG/G (ref 0–30)
PDW BLD-RTO: 14.4 % (ref 11.5–14.5)
PLATELET # BLD: 201 THOU/MM3 (ref 130–400)
PMV BLD AUTO: 8.4 FL (ref 7.4–10.4)
PROSTATE SPECIFIC ANTIGEN: 1.05 NG/ML (ref 0–1)
RBC # BLD: 4.01 MILL/MM3 (ref 4.7–6.1)
TRIGL SERPL-MCNC: 129 MG/DL (ref 0–199)
TSH SERPL DL<=0.05 MIU/L-ACNC: 2.56 UIU/ML (ref 0.4–4.2)
WBC # BLD: 9.4 THOU/MM3 (ref 4.8–10.8)

## 2018-05-14 PROCEDURE — 82043 UR ALBUMIN QUANTITATIVE: CPT

## 2018-05-14 PROCEDURE — 83036 HEMOGLOBIN GLYCOSYLATED A1C: CPT

## 2018-05-14 PROCEDURE — G0103 PSA SCREENING: HCPCS

## 2018-05-14 PROCEDURE — 85027 COMPLETE CBC AUTOMATED: CPT

## 2018-05-14 PROCEDURE — 36415 COLL VENOUS BLD VENIPUNCTURE: CPT

## 2018-05-14 PROCEDURE — 80061 LIPID PANEL: CPT

## 2018-05-14 PROCEDURE — 84443 ASSAY THYROID STIM HORMONE: CPT

## 2018-05-15 ENCOUNTER — TELEPHONE (OUTPATIENT)
Dept: PHARMACY | Age: 53
End: 2018-05-15

## 2018-05-17 ENCOUNTER — HOSPITAL ENCOUNTER (OUTPATIENT)
Dept: PHARMACY | Age: 53
Setting detail: THERAPIES SERIES
Discharge: HOME | End: 2018-05-17
Payer: COMMERCIAL

## 2018-05-17 DIAGNOSIS — I48.0 PAROXYSMAL ATRIAL FIBRILLATION (HCC): ICD-10-CM

## 2018-05-17 LAB — POC INR: 1.6 (ref 0.8–1.2)

## 2018-05-17 PROCEDURE — 85610 PROTHROMBIN TIME: CPT

## 2018-05-17 PROCEDURE — 36416 COLLJ CAPILLARY BLOOD SPEC: CPT

## 2018-05-17 PROCEDURE — 99211 OFF/OP EST MAY X REQ PHY/QHP: CPT

## 2018-05-18 ENCOUNTER — APPOINTMENT (OUTPATIENT)
Dept: NUCLEAR MEDICINE | Age: 53
End: 2018-05-18
Payer: COMMERCIAL

## 2018-05-18 ENCOUNTER — APPOINTMENT (OUTPATIENT)
Dept: GENERAL RADIOLOGY | Age: 53
End: 2018-05-18
Payer: COMMERCIAL

## 2018-05-18 ENCOUNTER — HOSPITAL ENCOUNTER (OUTPATIENT)
Age: 53
Setting detail: OBSERVATION
Discharge: HOME | End: 2018-05-19
Attending: FAMILY MEDICINE | Admitting: HOSPITALIST
Payer: COMMERCIAL

## 2018-05-18 ENCOUNTER — APPOINTMENT (OUTPATIENT)
Dept: CT IMAGING | Age: 53
End: 2018-05-18
Payer: COMMERCIAL

## 2018-05-18 DIAGNOSIS — N17.9 ACUTE RENAL FAILURE, UNSPECIFIED ACUTE RENAL FAILURE TYPE (HCC): Primary | ICD-10-CM

## 2018-05-18 DIAGNOSIS — F41.3 OTHER MIXED ANXIETY DISORDERS: ICD-10-CM

## 2018-05-18 DIAGNOSIS — N17.9 AKI (ACUTE KIDNEY INJURY) (HCC): ICD-10-CM

## 2018-05-18 LAB
ALBUMIN SERPL-MCNC: 3.7 G/DL (ref 3.5–5.1)
ALP BLD-CCNC: 81 U/L (ref 38–126)
ALT SERPL-CCNC: 34 U/L (ref 11–66)
ANION GAP SERPL CALCULATED.3IONS-SCNC: 11 MEQ/L (ref 8–16)
ANISOCYTOSIS: ABNORMAL
APTT: 39.5 SECONDS (ref 22–38)
AST SERPL-CCNC: 38 U/L (ref 5–40)
BACTERIA: ABNORMAL
BASOPHILS # BLD: 0.5 %
BASOPHILS ABSOLUTE: 0 THOU/MM3 (ref 0–0.1)
BILIRUB SERPL-MCNC: 0.5 MG/DL (ref 0.3–1.2)
BILIRUBIN DIRECT: < 0.2 MG/DL (ref 0–0.3)
BILIRUBIN URINE: NEGATIVE
BLOOD, URINE: NEGATIVE
BUN BLDV-MCNC: 27 MG/DL (ref 7–22)
CALCIUM SERPL-MCNC: 8.8 MG/DL (ref 8.5–10.5)
CASTS: ABNORMAL /LPF
CASTS: ABNORMAL /LPF
CHARACTER, URINE: CLEAR
CHLORIDE BLD-SCNC: 105 MEQ/L (ref 98–111)
CHLORIDE, URINE: < 20 MEQ/L
CO2: 21 MEQ/L (ref 23–33)
COLOR: YELLOW
CREAT SERPL-MCNC: 1.8 MG/DL (ref 0.4–1.2)
CREATININE URINE: 55.8 MG/DL
CREATININE, URINE: 55.8 MG/DL
CRYSTALS: ABNORMAL
D-DIMER QUANTITATIVE: 646 NG/ML FEU (ref 0–500)
EKG ATRIAL RATE: 53 BPM
EKG P AXIS: 2 DEGREES
EKG P-R INTERVAL: 200 MS
EKG Q-T INTERVAL: 470 MS
EKG QRS DURATION: 80 MS
EKG QTC CALCULATION (BAZETT): 441 MS
EKG R AXIS: -12 DEGREES
EKG T AXIS: 79 DEGREES
EKG VENTRICULAR RATE: 53 BPM
EOSINOPHIL # BLD: 2.2 %
EOSINOPHILS ABSOLUTE: 0.2 THOU/MM3 (ref 0–0.4)
EPITHELIAL CELLS, UA: ABNORMAL /HPF
GFR SERPL CREATININE-BSD FRML MDRD: 40 ML/MIN/1.73M2
GLUCOSE BLD-MCNC: 182 MG/DL (ref 70–108)
GLUCOSE, URINE: NEGATIVE MG/DL
HCT VFR BLD CALC: 36.1 % (ref 42–52)
HEMOGLOBIN: 12.4 GM/DL (ref 14–18)
INR BLD: 1.28 (ref 0.85–1.13)
KETONES, URINE: NEGATIVE
LEUKOCYTE ESTERASE, URINE: ABNORMAL
LIPASE: 67.6 U/L (ref 5.6–51.3)
LYMPHOCYTES # BLD: 10 %
LYMPHOCYTES ABSOLUTE: 0.9 THOU/MM3 (ref 1–4.8)
MAGNESIUM: 2.5 MG/DL (ref 1.6–2.4)
MCH RBC QN AUTO: 32.8 PG (ref 27–31)
MCHC RBC AUTO-ENTMCNC: 34.3 GM/DL (ref 33–37)
MCV RBC AUTO: 95.6 FL (ref 80–94)
MICROALBUMIN UR-MCNC: 62.03 MG/DL
MICROALBUMIN/CREAT UR-RTO: 1112 MG/G (ref 0–30)
MISCELLANEOUS LAB TEST RESULT: ABNORMAL
MONOCYTES # BLD: 8.6 %
MONOCYTES ABSOLUTE: 0.8 THOU/MM3 (ref 0.4–1.3)
NITRITE, URINE: NEGATIVE
NUCLEATED RED BLOOD CELLS: 0 /100 WBC
OSMOLALITY CALCULATION: 283.6 MOSMOL/KG (ref 275–300)
PDW BLD-RTO: 15.3 % (ref 11.5–14.5)
PH UA: 6
PLATELET # BLD: 184 THOU/MM3 (ref 130–400)
PMV BLD AUTO: 8.7 FL (ref 7.4–10.4)
POTASSIUM SERPL-SCNC: 5.1 MEQ/L (ref 3.5–5.2)
POTASSIUM, URINE: 32.9 MEQ/L
PRO-BNP: 1084 PG/ML (ref 0–900)
PROTEIN UA: 100 MG/DL
PROTEIN, URINE: 83.2 MG/DL
RBC # BLD: 3.78 MILL/MM3 (ref 4.7–6.1)
RBC URINE: ABNORMAL /HPF
RENAL EPITHELIAL, UA: ABNORMAL
SEG NEUTROPHILS: 78.7 %
SEGMENTED NEUTROPHILS ABSOLUTE COUNT: 7.2 THOU/MM3 (ref 1.8–7.7)
SODIUM BLD-SCNC: 137 MEQ/L (ref 135–145)
SODIUM URINE: < 20 MEQ/L
SPECIFIC GRAVITY UA: 1.01 (ref 1–1.03)
TOTAL PROTEIN: 6.3 G/DL (ref 6.1–8)
TROPONIN T: 0.02 NG/ML
TROPONIN T: < 0.01 NG/ML
UROBILINOGEN, URINE: 0.2 EU/DL
WBC # BLD: 9.1 THOU/MM3 (ref 4.8–10.8)
WBC UA: ABNORMAL /HPF
YEAST: ABNORMAL

## 2018-05-18 PROCEDURE — 83690 ASSAY OF LIPASE: CPT

## 2018-05-18 PROCEDURE — 93010 ELECTROCARDIOGRAM REPORT: CPT | Performed by: INTERNAL MEDICINE

## 2018-05-18 PROCEDURE — G0378 HOSPITAL OBSERVATION PER HR: HCPCS

## 2018-05-18 PROCEDURE — 83880 ASSAY OF NATRIURETIC PEPTIDE: CPT

## 2018-05-18 PROCEDURE — 99220 PR INITIAL OBSERVATION CARE/DAY 70 MINUTES: CPT | Performed by: INTERNAL MEDICINE

## 2018-05-18 PROCEDURE — 85025 COMPLETE CBC W/AUTO DIFF WBC: CPT

## 2018-05-18 PROCEDURE — 96374 THER/PROPH/DIAG INJ IV PUSH: CPT

## 2018-05-18 PROCEDURE — 84133 ASSAY OF URINE POTASSIUM: CPT

## 2018-05-18 PROCEDURE — 6370000000 HC RX 637 (ALT 250 FOR IP): Performed by: HOSPITALIST

## 2018-05-18 PROCEDURE — 96365 THER/PROPH/DIAG IV INF INIT: CPT

## 2018-05-18 PROCEDURE — 81001 URINALYSIS AUTO W/SCOPE: CPT

## 2018-05-18 PROCEDURE — 36415 COLL VENOUS BLD VENIPUNCTURE: CPT

## 2018-05-18 PROCEDURE — 74176 CT ABD & PELVIS W/O CONTRAST: CPT

## 2018-05-18 PROCEDURE — 3430000000 HC RX DIAGNOSTIC RADIOPHARMACEUTICAL: Performed by: FAMILY MEDICINE

## 2018-05-18 PROCEDURE — 83735 ASSAY OF MAGNESIUM: CPT

## 2018-05-18 PROCEDURE — 99220 PR INITIAL OBSERVATION CARE/DAY 70 MINUTES: CPT | Performed by: FAMILY MEDICINE

## 2018-05-18 PROCEDURE — 85730 THROMBOPLASTIN TIME PARTIAL: CPT

## 2018-05-18 PROCEDURE — A9558 XE133 XENON 10MCI: HCPCS | Performed by: FAMILY MEDICINE

## 2018-05-18 PROCEDURE — 93005 ELECTROCARDIOGRAM TRACING: CPT | Performed by: FAMILY MEDICINE

## 2018-05-18 PROCEDURE — 82043 UR ALBUMIN QUANTITATIVE: CPT

## 2018-05-18 PROCEDURE — 99285 EMERGENCY DEPT VISIT HI MDM: CPT

## 2018-05-18 PROCEDURE — 84300 ASSAY OF URINE SODIUM: CPT

## 2018-05-18 PROCEDURE — 84484 ASSAY OF TROPONIN QUANT: CPT

## 2018-05-18 PROCEDURE — 78582 LUNG VENTILAT&PERFUS IMAGING: CPT

## 2018-05-18 PROCEDURE — 6370000000 HC RX 637 (ALT 250 FOR IP): Performed by: FAMILY MEDICINE

## 2018-05-18 PROCEDURE — 71045 X-RAY EXAM CHEST 1 VIEW: CPT

## 2018-05-18 PROCEDURE — 84156 ASSAY OF PROTEIN URINE: CPT

## 2018-05-18 PROCEDURE — 80053 COMPREHEN METABOLIC PANEL: CPT

## 2018-05-18 PROCEDURE — 85379 FIBRIN DEGRADATION QUANT: CPT

## 2018-05-18 PROCEDURE — 2580000003 HC RX 258: Performed by: NURSE PRACTITIONER

## 2018-05-18 PROCEDURE — 82248 BILIRUBIN DIRECT: CPT

## 2018-05-18 PROCEDURE — A9540 TC99M MAA: HCPCS | Performed by: FAMILY MEDICINE

## 2018-05-18 PROCEDURE — 2580000003 HC RX 258: Performed by: FAMILY MEDICINE

## 2018-05-18 PROCEDURE — 82436 ASSAY OF URINE CHLORIDE: CPT

## 2018-05-18 PROCEDURE — 6360000002 HC RX W HCPCS: Performed by: INTERNAL MEDICINE

## 2018-05-18 PROCEDURE — 82570 ASSAY OF URINE CREATININE: CPT

## 2018-05-18 PROCEDURE — 85610 PROTHROMBIN TIME: CPT

## 2018-05-18 RX ORDER — DIPHENHYDRAMINE HCL 25 MG
25 TABLET ORAL NIGHTLY PRN
Status: DISCONTINUED | OUTPATIENT
Start: 2018-05-18 | End: 2018-05-19 | Stop reason: HOSPADM

## 2018-05-18 RX ORDER — SODIUM CHLORIDE 9 MG/ML
INJECTION, SOLUTION INTRAVENOUS CONTINUOUS
Status: DISCONTINUED | OUTPATIENT
Start: 2018-05-18 | End: 2018-05-18

## 2018-05-18 RX ORDER — ISOSORBIDE MONONITRATE 60 MG/1
60 TABLET, EXTENDED RELEASE ORAL DAILY
Status: DISCONTINUED | OUTPATIENT
Start: 2018-05-18 | End: 2018-05-19 | Stop reason: HOSPADM

## 2018-05-18 RX ORDER — WARFARIN SODIUM 5 MG/1
5 TABLET ORAL DAILY
Status: COMPLETED | OUTPATIENT
Start: 2018-05-18 | End: 2018-05-18

## 2018-05-18 RX ORDER — CIPROFLOXACIN 2 MG/ML
400 INJECTION, SOLUTION INTRAVENOUS EVERY 12 HOURS
Status: DISCONTINUED | OUTPATIENT
Start: 2018-05-18 | End: 2018-05-19

## 2018-05-18 RX ORDER — METOPROLOL TARTRATE 50 MG/1
50 TABLET, FILM COATED ORAL 2 TIMES DAILY
Status: DISCONTINUED | OUTPATIENT
Start: 2018-05-18 | End: 2018-05-19 | Stop reason: HOSPADM

## 2018-05-18 RX ORDER — SODIUM CHLORIDE 0.9 % (FLUSH) 0.9 %
10 SYRINGE (ML) INJECTION EVERY 12 HOURS SCHEDULED
Status: DISCONTINUED | OUTPATIENT
Start: 2018-05-18 | End: 2018-05-19 | Stop reason: HOSPADM

## 2018-05-18 RX ORDER — ATORVASTATIN CALCIUM 40 MG/1
40 TABLET, FILM COATED ORAL NIGHTLY
Status: DISCONTINUED | OUTPATIENT
Start: 2018-05-18 | End: 2018-05-19 | Stop reason: HOSPADM

## 2018-05-18 RX ORDER — NICOTINE 21 MG/24HR
1 PATCH, TRANSDERMAL 24 HOURS TRANSDERMAL EVERY 24 HOURS
Status: DISCONTINUED | OUTPATIENT
Start: 2018-05-18 | End: 2018-05-19 | Stop reason: HOSPADM

## 2018-05-18 RX ORDER — 0.9 % SODIUM CHLORIDE 0.9 %
1000 INTRAVENOUS SOLUTION INTRAVENOUS ONCE
Status: COMPLETED | OUTPATIENT
Start: 2018-05-18 | End: 2018-05-18

## 2018-05-18 RX ORDER — SODIUM CHLORIDE 0.9 % (FLUSH) 0.9 %
10 SYRINGE (ML) INJECTION PRN
Status: DISCONTINUED | OUTPATIENT
Start: 2018-05-18 | End: 2018-05-19 | Stop reason: HOSPADM

## 2018-05-18 RX ORDER — HYDRALAZINE HYDROCHLORIDE 50 MG/1
50 TABLET, FILM COATED ORAL EVERY 8 HOURS SCHEDULED
Status: DISCONTINUED | OUTPATIENT
Start: 2018-05-18 | End: 2018-05-19 | Stop reason: HOSPADM

## 2018-05-18 RX ORDER — TAMSULOSIN HYDROCHLORIDE 0.4 MG/1
0.4 CAPSULE ORAL DAILY
Status: DISCONTINUED | OUTPATIENT
Start: 2018-05-18 | End: 2018-05-19 | Stop reason: HOSPADM

## 2018-05-18 RX ORDER — AMIODARONE HYDROCHLORIDE 200 MG/1
200 TABLET ORAL 2 TIMES DAILY
Status: DISCONTINUED | OUTPATIENT
Start: 2018-05-18 | End: 2018-05-19 | Stop reason: HOSPADM

## 2018-05-18 RX ORDER — ACETAMINOPHEN 325 MG/1
650 TABLET ORAL EVERY 4 HOURS PRN
Status: DISCONTINUED | OUTPATIENT
Start: 2018-05-18 | End: 2018-05-19 | Stop reason: HOSPADM

## 2018-05-18 RX ORDER — BACLOFEN 10 MG/1
20 TABLET ORAL 3 TIMES DAILY
Status: DISCONTINUED | OUTPATIENT
Start: 2018-05-18 | End: 2018-05-19 | Stop reason: HOSPADM

## 2018-05-18 RX ORDER — NITROGLYCERIN 0.4 MG/1
0.4 TABLET SUBLINGUAL EVERY 5 MIN PRN
Status: DISCONTINUED | OUTPATIENT
Start: 2018-05-18 | End: 2018-05-19 | Stop reason: HOSPADM

## 2018-05-18 RX ORDER — ASPIRIN 81 MG/1
81 TABLET ORAL DAILY
Status: DISCONTINUED | OUTPATIENT
Start: 2018-05-18 | End: 2018-05-19 | Stop reason: HOSPADM

## 2018-05-18 RX ORDER — FINASTERIDE 5 MG/1
5 TABLET, FILM COATED ORAL DAILY
Status: DISCONTINUED | OUTPATIENT
Start: 2018-05-18 | End: 2018-05-19 | Stop reason: HOSPADM

## 2018-05-18 RX ORDER — ACETAMINOPHEN 325 MG/1
650 TABLET ORAL EVERY 4 HOURS PRN
Status: DISCONTINUED | OUTPATIENT
Start: 2018-05-18 | End: 2018-05-18 | Stop reason: SDUPTHER

## 2018-05-18 RX ORDER — SODIUM CHLORIDE 0.9 % (FLUSH) 0.9 %
10 SYRINGE (ML) INJECTION EVERY 12 HOURS SCHEDULED
Status: DISCONTINUED | OUTPATIENT
Start: 2018-05-18 | End: 2018-05-18 | Stop reason: SDUPTHER

## 2018-05-18 RX ORDER — ALPRAZOLAM 0.5 MG/1
0.5 TABLET ORAL NIGHTLY PRN
Status: DISCONTINUED | OUTPATIENT
Start: 2018-05-18 | End: 2018-05-19 | Stop reason: HOSPADM

## 2018-05-18 RX ORDER — SODIUM CHLORIDE 0.9 % (FLUSH) 0.9 %
10 SYRINGE (ML) INJECTION PRN
Status: DISCONTINUED | OUTPATIENT
Start: 2018-05-18 | End: 2018-05-18 | Stop reason: SDUPTHER

## 2018-05-18 RX ORDER — ONDANSETRON 2 MG/ML
4 INJECTION INTRAMUSCULAR; INTRAVENOUS EVERY 6 HOURS PRN
Status: DISCONTINUED | OUTPATIENT
Start: 2018-05-18 | End: 2018-05-19 | Stop reason: HOSPADM

## 2018-05-18 RX ORDER — MEXILETINE HYDROCHLORIDE 150 MG/1
150 CAPSULE ORAL EVERY 8 HOURS SCHEDULED
Status: DISCONTINUED | OUTPATIENT
Start: 2018-05-18 | End: 2018-05-19 | Stop reason: HOSPADM

## 2018-05-18 RX ADMIN — BACLOFEN 20 MG: 10 TABLET ORAL at 12:44

## 2018-05-18 RX ADMIN — ALPRAZOLAM 0.5 MG: 0.5 TABLET ORAL at 23:23

## 2018-05-18 RX ADMIN — AMIODARONE HYDROCHLORIDE 200 MG: 200 TABLET ORAL at 12:43

## 2018-05-18 RX ADMIN — ISOSORBIDE MONONITRATE 60 MG: 60 TABLET ORAL at 12:44

## 2018-05-18 RX ADMIN — HYDRALAZINE HYDROCHLORIDE 50 MG: 50 TABLET, FILM COATED ORAL at 12:44

## 2018-05-18 RX ADMIN — FINASTERIDE 5 MG: 5 TABLET, FILM COATED ORAL at 12:44

## 2018-05-18 RX ADMIN — Medication 7.9 MILLICURIE: at 10:54

## 2018-05-18 RX ADMIN — ATORVASTATIN CALCIUM 40 MG: 40 TABLET, FILM COATED ORAL at 20:05

## 2018-05-18 RX ADMIN — DIPHENHYDRAMINE HCL 25 MG: 25 TABLET ORAL at 23:23

## 2018-05-18 RX ADMIN — WARFARIN SODIUM 5 MG: 5 TABLET ORAL at 18:22

## 2018-05-18 RX ADMIN — Medication 10 ML: at 20:05

## 2018-05-18 RX ADMIN — Medication 10 ML: at 12:45

## 2018-05-18 RX ADMIN — TICAGRELOR 90 MG: 90 TABLET ORAL at 20:05

## 2018-05-18 RX ADMIN — AMIODARONE HYDROCHLORIDE 200 MG: 200 TABLET ORAL at 20:05

## 2018-05-18 RX ADMIN — SODIUM CHLORIDE 1000 ML: 9 INJECTION, SOLUTION INTRAVENOUS at 06:34

## 2018-05-18 RX ADMIN — CIPROFLOXACIN 400 MG: 2 INJECTION, SOLUTION INTRAVENOUS at 19:48

## 2018-05-18 RX ADMIN — MEXILETINE HYDROCHLORIDE 150 MG: 150 CAPSULE ORAL at 23:23

## 2018-05-18 RX ADMIN — MEXILETINE HYDROCHLORIDE 150 MG: 150 CAPSULE ORAL at 12:44

## 2018-05-18 RX ADMIN — BACLOFEN 20 MG: 10 TABLET ORAL at 20:05

## 2018-05-18 RX ADMIN — METOPROLOL TARTRATE 50 MG: 50 TABLET, FILM COATED ORAL at 20:05

## 2018-05-18 RX ADMIN — TAMSULOSIN HYDROCHLORIDE 0.4 MG: 0.4 CAPSULE ORAL at 12:43

## 2018-05-18 RX ADMIN — HYDRALAZINE HYDROCHLORIDE 50 MG: 50 TABLET, FILM COATED ORAL at 23:23

## 2018-05-18 RX ADMIN — METOPROLOL TARTRATE 50 MG: 50 TABLET, FILM COATED ORAL at 12:43

## 2018-05-18 RX ADMIN — ASPIRIN 81 MG: 81 TABLET, COATED ORAL at 12:44

## 2018-05-18 RX ADMIN — Medication 3.2 MILLICURIE: at 11:00

## 2018-05-18 RX ADMIN — TICAGRELOR 90 MG: 90 TABLET ORAL at 12:48

## 2018-05-18 ASSESSMENT — PAIN DESCRIPTION - PAIN TYPE
TYPE: ACUTE PAIN
TYPE: CHRONIC PAIN
TYPE: CHRONIC PAIN
TYPE: ACUTE PAIN
TYPE: ACUTE PAIN

## 2018-05-18 ASSESSMENT — ENCOUNTER SYMPTOMS
HEARTBURN: 0
EYE PAIN: 0
RHINORRHEA: 0
SORE THROAT: 0
WHEEZING: 0
EYES NEGATIVE: 1
EYE DISCHARGE: 0
COUGH: 0
SHORTNESS OF BREATH: 1
VOMITING: 0
ABDOMINAL PAIN: 0
DIARRHEA: 0
BACK PAIN: 0
NAUSEA: 0
EYE REDNESS: 0

## 2018-05-18 ASSESSMENT — PAIN DESCRIPTION - FREQUENCY
FREQUENCY: CONTINUOUS

## 2018-05-18 ASSESSMENT — PAIN DESCRIPTION - ORIENTATION
ORIENTATION: LEFT;LOWER
ORIENTATION: LOWER;LEFT
ORIENTATION: LOWER;LEFT

## 2018-05-18 ASSESSMENT — PAIN DESCRIPTION - LOCATION
LOCATION: BACK

## 2018-05-18 ASSESSMENT — PAIN DESCRIPTION - DESCRIPTORS
DESCRIPTORS: SHARP
DESCRIPTORS: ACHING;CONSTANT
DESCRIPTORS: SHARP
DESCRIPTORS: ACHING
DESCRIPTORS: ACHING;CONSTANT

## 2018-05-18 ASSESSMENT — PAIN DESCRIPTION - PROGRESSION
CLINICAL_PROGRESSION: NOT CHANGED

## 2018-05-18 ASSESSMENT — PAIN SCALES - GENERAL
PAINLEVEL_OUTOF10: 7
PAINLEVEL_OUTOF10: 6
PAINLEVEL_OUTOF10: 7

## 2018-05-18 ASSESSMENT — PAIN DESCRIPTION - ONSET
ONSET: ON-GOING

## 2018-05-19 ENCOUNTER — TELEPHONE (OUTPATIENT)
Dept: FAMILY MEDICINE CLINIC | Age: 53
End: 2018-05-19

## 2018-05-19 ENCOUNTER — NURSE TRIAGE (OUTPATIENT)
Dept: ADMINISTRATIVE | Age: 53
End: 2018-05-19

## 2018-05-19 VITALS
RESPIRATION RATE: 18 BRPM | SYSTOLIC BLOOD PRESSURE: 187 MMHG | HEART RATE: 58 BPM | HEIGHT: 74 IN | TEMPERATURE: 98.7 F | WEIGHT: 273.6 LBS | DIASTOLIC BLOOD PRESSURE: 90 MMHG | OXYGEN SATURATION: 99 % | BODY MASS INDEX: 35.11 KG/M2

## 2018-05-19 LAB
ANION GAP SERPL CALCULATED.3IONS-SCNC: 11 MEQ/L (ref 8–16)
BUN BLDV-MCNC: 33 MG/DL (ref 7–22)
CALCIUM SERPL-MCNC: 8.5 MG/DL (ref 8.5–10.5)
CHLORIDE BLD-SCNC: 104 MEQ/L (ref 98–111)
CO2: 22 MEQ/L (ref 23–33)
CREAT SERPL-MCNC: 1.9 MG/DL (ref 0.4–1.2)
GFR SERPL CREATININE-BSD FRML MDRD: 37 ML/MIN/1.73M2
GLUCOSE BLD-MCNC: 100 MG/DL (ref 70–108)
HCT VFR BLD CALC: 35.4 % (ref 42–52)
HEMOGLOBIN: 11.9 GM/DL (ref 14–18)
INR BLD: 1.53 (ref 0.85–1.13)
MCH RBC QN AUTO: 32.6 PG (ref 27–31)
MCHC RBC AUTO-ENTMCNC: 33.7 GM/DL (ref 33–37)
MCV RBC AUTO: 96.6 FL (ref 80–94)
PDW BLD-RTO: 14.8 % (ref 11.5–14.5)
PLATELET # BLD: 163 THOU/MM3 (ref 130–400)
PMV BLD AUTO: 8.7 FL (ref 7.4–10.4)
POTASSIUM SERPL-SCNC: 4.8 MEQ/L (ref 3.5–5.2)
RBC # BLD: 3.66 MILL/MM3 (ref 4.7–6.1)
SODIUM BLD-SCNC: 137 MEQ/L (ref 135–145)
T4 FREE: 1.55 NG/DL (ref 0.93–1.76)
WBC # BLD: 7.6 THOU/MM3 (ref 4.8–10.8)

## 2018-05-19 PROCEDURE — 85027 COMPLETE CBC AUTOMATED: CPT

## 2018-05-19 PROCEDURE — 96366 THER/PROPH/DIAG IV INF ADDON: CPT

## 2018-05-19 PROCEDURE — 99226 PR SBSQ OBSERVATION CARE/DAY 35 MINUTES: CPT | Performed by: INTERNAL MEDICINE

## 2018-05-19 PROCEDURE — 6360000002 HC RX W HCPCS: Performed by: HOSPITALIST

## 2018-05-19 PROCEDURE — G0378 HOSPITAL OBSERVATION PER HR: HCPCS

## 2018-05-19 PROCEDURE — 96372 THER/PROPH/DIAG INJ SC/IM: CPT

## 2018-05-19 PROCEDURE — 84439 ASSAY OF FREE THYROXINE: CPT

## 2018-05-19 PROCEDURE — 36415 COLL VENOUS BLD VENIPUNCTURE: CPT

## 2018-05-19 PROCEDURE — 6370000000 HC RX 637 (ALT 250 FOR IP): Performed by: FAMILY MEDICINE

## 2018-05-19 PROCEDURE — 99217 PR OBSERVATION CARE DISCHARGE MANAGEMENT: CPT | Performed by: INTERNAL MEDICINE

## 2018-05-19 PROCEDURE — 6360000002 HC RX W HCPCS: Performed by: INTERNAL MEDICINE

## 2018-05-19 PROCEDURE — 96375 TX/PRO/DX INJ NEW DRUG ADDON: CPT

## 2018-05-19 PROCEDURE — 80048 BASIC METABOLIC PNL TOTAL CA: CPT

## 2018-05-19 PROCEDURE — 85610 PROTHROMBIN TIME: CPT

## 2018-05-19 RX ORDER — CIPROFLOXACIN 500 MG/1
500 TABLET, FILM COATED ORAL 2 TIMES DAILY
Qty: 20 TABLET | Refills: 0 | Status: SHIPPED | OUTPATIENT
Start: 2018-05-19 | End: 2018-05-29

## 2018-05-19 RX ORDER — LORAZEPAM 2 MG/ML
1 INJECTION INTRAMUSCULAR ONCE
Status: COMPLETED | OUTPATIENT
Start: 2018-05-19 | End: 2018-05-19

## 2018-05-19 RX ORDER — FUROSEMIDE 40 MG/1
40 TABLET ORAL
Qty: 90 TABLET | Refills: 3 | Status: SHIPPED | OUTPATIENT
Start: 2018-05-19 | End: 2018-07-30

## 2018-05-19 RX ORDER — ALPRAZOLAM 0.5 MG/1
TABLET ORAL
Qty: 30 TABLET | Refills: 5
Start: 2018-05-19 | End: 2018-12-03 | Stop reason: SDUPTHER

## 2018-05-19 RX ORDER — WARFARIN SODIUM 5 MG/1
5 TABLET ORAL ONCE
Status: DISCONTINUED | OUTPATIENT
Start: 2018-05-19 | End: 2018-05-19 | Stop reason: HOSPADM

## 2018-05-19 RX ORDER — HALOPERIDOL 5 MG/ML
5 INJECTION INTRAMUSCULAR ONCE
Status: COMPLETED | OUTPATIENT
Start: 2018-05-19 | End: 2018-05-19

## 2018-05-19 RX ADMIN — TICAGRELOR 90 MG: 90 TABLET ORAL at 11:10

## 2018-05-19 RX ADMIN — ASPIRIN 81 MG: 81 TABLET, COATED ORAL at 11:10

## 2018-05-19 RX ADMIN — FINASTERIDE 5 MG: 5 TABLET, FILM COATED ORAL at 11:10

## 2018-05-19 RX ADMIN — BACLOFEN 20 MG: 10 TABLET ORAL at 11:10

## 2018-05-19 RX ADMIN — HALOPERIDOL LACTATE 5 MG: 5 INJECTION, SOLUTION INTRAMUSCULAR at 04:12

## 2018-05-19 RX ADMIN — ISOSORBIDE MONONITRATE 60 MG: 60 TABLET ORAL at 11:10

## 2018-05-19 RX ADMIN — CIPROFLOXACIN 400 MG: 2 INJECTION, SOLUTION INTRAVENOUS at 06:50

## 2018-05-19 RX ADMIN — HYDRALAZINE HYDROCHLORIDE 50 MG: 50 TABLET, FILM COATED ORAL at 05:33

## 2018-05-19 RX ADMIN — TAMSULOSIN HYDROCHLORIDE 0.4 MG: 0.4 CAPSULE ORAL at 11:10

## 2018-05-19 RX ADMIN — MEXILETINE HYDROCHLORIDE 150 MG: 150 CAPSULE ORAL at 05:33

## 2018-05-19 RX ADMIN — LORAZEPAM 1 MG: 2 INJECTION INTRAMUSCULAR; INTRAVENOUS at 01:28

## 2018-05-19 RX ADMIN — METOPROLOL TARTRATE 50 MG: 50 TABLET, FILM COATED ORAL at 11:10

## 2018-05-19 RX ADMIN — AMIODARONE HYDROCHLORIDE 200 MG: 200 TABLET ORAL at 11:09

## 2018-05-19 ASSESSMENT — PAIN DESCRIPTION - ORIENTATION: ORIENTATION: LEFT;LOWER

## 2018-05-19 ASSESSMENT — PAIN DESCRIPTION - LOCATION: LOCATION: BACK

## 2018-05-19 ASSESSMENT — PAIN SCALES - GENERAL
PAINLEVEL_OUTOF10: 0
PAINLEVEL_OUTOF10: 6

## 2018-05-19 ASSESSMENT — PAIN DESCRIPTION - PAIN TYPE: TYPE: CHRONIC PAIN

## 2018-05-19 ASSESSMENT — PAIN DESCRIPTION - FREQUENCY: FREQUENCY: CONTINUOUS

## 2018-05-19 ASSESSMENT — PAIN DESCRIPTION - DESCRIPTORS: DESCRIPTORS: SHARP

## 2018-05-21 ENCOUNTER — HOSPITAL ENCOUNTER (OUTPATIENT)
Age: 53
Discharge: HOME | End: 2018-05-21
Payer: COMMERCIAL

## 2018-05-21 ENCOUNTER — TELEPHONE (OUTPATIENT)
Dept: FAMILY MEDICINE CLINIC | Age: 53
End: 2018-05-21

## 2018-05-21 DIAGNOSIS — N17.9 AKI (ACUTE KIDNEY INJURY) (HCC): ICD-10-CM

## 2018-05-21 LAB
ANION GAP SERPL CALCULATED.3IONS-SCNC: 11 MEQ/L (ref 8–16)
BUN BLDV-MCNC: 27 MG/DL (ref 7–22)
CALCIUM SERPL-MCNC: 8.9 MG/DL (ref 8.5–10.5)
CHLORIDE BLD-SCNC: 105 MEQ/L (ref 98–111)
CO2: 23 MEQ/L (ref 23–33)
CREAT SERPL-MCNC: 1.4 MG/DL (ref 0.4–1.2)
GFR SERPL CREATININE-BSD FRML MDRD: 53 ML/MIN/1.73M2
GLUCOSE BLD-MCNC: 137 MG/DL (ref 70–108)
POTASSIUM SERPL-SCNC: 4.4 MEQ/L (ref 3.5–5.2)
SODIUM BLD-SCNC: 139 MEQ/L (ref 135–145)

## 2018-05-21 PROCEDURE — 36415 COLL VENOUS BLD VENIPUNCTURE: CPT

## 2018-05-21 PROCEDURE — 80048 BASIC METABOLIC PNL TOTAL CA: CPT

## 2018-05-23 ENCOUNTER — HOSPITAL ENCOUNTER (OUTPATIENT)
Dept: PHARMACY | Age: 53
Setting detail: THERAPIES SERIES
Discharge: HOME | End: 2018-05-23
Payer: COMMERCIAL

## 2018-05-23 DIAGNOSIS — I48.0 PAROXYSMAL ATRIAL FIBRILLATION (HCC): ICD-10-CM

## 2018-05-23 LAB — POC INR: 1.7 (ref 0.8–1.2)

## 2018-05-23 PROCEDURE — 99211 OFF/OP EST MAY X REQ PHY/QHP: CPT

## 2018-05-23 PROCEDURE — 85610 PROTHROMBIN TIME: CPT

## 2018-05-23 PROCEDURE — 36416 COLLJ CAPILLARY BLOOD SPEC: CPT

## 2018-05-31 ENCOUNTER — HOSPITAL ENCOUNTER (OUTPATIENT)
Dept: PHARMACY | Age: 53
Setting detail: THERAPIES SERIES
Discharge: HOME | End: 2018-05-31
Payer: COMMERCIAL

## 2018-05-31 DIAGNOSIS — I48.0 PAROXYSMAL ATRIAL FIBRILLATION (HCC): ICD-10-CM

## 2018-05-31 LAB — POC INR: 1.8 (ref 0.8–1.2)

## 2018-06-01 ENCOUNTER — TELEPHONE (OUTPATIENT)
Dept: NEPHROLOGY | Age: 53
End: 2018-06-01

## 2018-06-01 DIAGNOSIS — N39.0 URINARY TRACT INFECTION WITHOUT HEMATURIA, SITE UNSPECIFIED: ICD-10-CM

## 2018-06-05 ENCOUNTER — HOSPITAL ENCOUNTER (OUTPATIENT)
Age: 53
Discharge: HOME | End: 2018-06-05
Payer: COMMERCIAL

## 2018-06-05 ENCOUNTER — OFFICE VISIT (OUTPATIENT)
Dept: UROLOGY | Age: 53
End: 2018-06-05
Payer: COMMERCIAL

## 2018-06-05 ENCOUNTER — TELEPHONE (OUTPATIENT)
Dept: UROLOGY | Age: 53
End: 2018-06-05

## 2018-06-05 VITALS
WEIGHT: 279 LBS | SYSTOLIC BLOOD PRESSURE: 128 MMHG | DIASTOLIC BLOOD PRESSURE: 82 MMHG | HEIGHT: 74 IN | BODY MASS INDEX: 35.81 KG/M2

## 2018-06-05 DIAGNOSIS — N50.89 SCROTAL SWELLING: ICD-10-CM

## 2018-06-05 DIAGNOSIS — N34.2 URETHRITIS: ICD-10-CM

## 2018-06-05 DIAGNOSIS — N43.3 HYDROCELE, UNSPECIFIED HYDROCELE TYPE: Primary | ICD-10-CM

## 2018-06-05 DIAGNOSIS — R97.20 ELEVATED PSA: ICD-10-CM

## 2018-06-05 DIAGNOSIS — R31.29 MICROSCOPIC HEMATURIA: ICD-10-CM

## 2018-06-05 PROBLEM — N39.0 URINARY TRACT INFECTION WITHOUT HEMATURIA: Status: ACTIVE | Noted: 2018-06-05

## 2018-06-05 LAB
BACTERIA: ABNORMAL /HPF
BILIRUBIN URINE: NEGATIVE
BILIRUBIN URINE: NEGATIVE
BLOOD URINE, POC: ABNORMAL
BLOOD, URINE: NEGATIVE
CASTS 2: ABNORMAL /LPF
CASTS UA: ABNORMAL /LPF
CHARACTER, URINE: CLEAR
CHARACTER, URINE: CLEAR
COLOR, URINE: YELLOW
COLOR: YELLOW
CRYSTALS, UA: ABNORMAL
EPITHELIAL CELLS, UA: ABNORMAL /HPF
GLUCOSE URINE: 100 MG/DL
GLUCOSE URINE: >= 1000 MG/DL
KETONES, URINE: NEGATIVE
KETONES, URINE: NEGATIVE
LEUKOCYTE CLUMPS, URINE: NEGATIVE
LEUKOCYTE ESTERASE, URINE: NEGATIVE
MISCELLANEOUS 2: ABNORMAL
NITRITE, URINE: NEGATIVE
NITRITE, URINE: NEGATIVE
PH UA: 5
PH, URINE: 6
POST VOID RESIDUAL (PVR): 9 ML
PROTEIN UA: 100
PROTEIN, URINE: >= 300 MG/DL
RBC URINE: ABNORMAL /HPF
RENAL EPITHELIAL, UA: ABNORMAL
SPECIFIC GRAVITY, URINE: 1.01 (ref 1–1.03)
SPECIFIC GRAVITY, URINE: 1.02 (ref 1–1.03)
UROBILINOGEN, URINE: 0.2 EU/DL
UROBILINOGEN, URINE: 0.2 EU/DL
WBC UA: ABNORMAL /HPF
YEAST: ABNORMAL

## 2018-06-05 PROCEDURE — 81003 URINALYSIS AUTO W/O SCOPE: CPT | Performed by: NURSE PRACTITIONER

## 2018-06-05 PROCEDURE — 99214 OFFICE O/P EST MOD 30 MIN: CPT | Performed by: NURSE PRACTITIONER

## 2018-06-05 PROCEDURE — 81001 URINALYSIS AUTO W/SCOPE: CPT

## 2018-06-05 PROCEDURE — 51798 US URINE CAPACITY MEASURE: CPT | Performed by: NURSE PRACTITIONER

## 2018-06-05 ASSESSMENT — ENCOUNTER SYMPTOMS
EYES NEGATIVE: 1
VOMITING: 0
ABDOMINAL PAIN: 0
ALLERGIC/IMMUNOLOGIC NEGATIVE: 1
ABDOMINAL DISTENTION: 0
BACK PAIN: 1
APNEA: 0
NAUSEA: 0
SHORTNESS OF BREATH: 0

## 2018-06-06 ENCOUNTER — TELEPHONE (OUTPATIENT)
Dept: NEUROSURGERY | Age: 53
End: 2018-06-06

## 2018-06-14 ENCOUNTER — HOSPITAL ENCOUNTER (OUTPATIENT)
Dept: ULTRASOUND IMAGING | Age: 53
Discharge: HOME | End: 2018-06-14
Payer: COMMERCIAL

## 2018-06-14 DIAGNOSIS — N50.89 SCROTAL SWELLING: ICD-10-CM

## 2018-06-14 PROCEDURE — 76870 US EXAM SCROTUM: CPT

## 2018-06-18 ENCOUNTER — OFFICE VISIT (OUTPATIENT)
Dept: NEPHROLOGY | Age: 53
End: 2018-06-18
Payer: COMMERCIAL

## 2018-06-18 ENCOUNTER — OFFICE VISIT (OUTPATIENT)
Dept: FAMILY MEDICINE CLINIC | Age: 53
End: 2018-06-18
Payer: COMMERCIAL

## 2018-06-18 ENCOUNTER — PROCEDURE VISIT (OUTPATIENT)
Dept: UROLOGY | Age: 53
End: 2018-06-18
Payer: COMMERCIAL

## 2018-06-18 VITALS
HEIGHT: 74 IN | SYSTOLIC BLOOD PRESSURE: 138 MMHG | DIASTOLIC BLOOD PRESSURE: 88 MMHG | BODY MASS INDEX: 35.96 KG/M2 | WEIGHT: 280.2 LBS

## 2018-06-18 VITALS
DIASTOLIC BLOOD PRESSURE: 81 MMHG | HEIGHT: 74 IN | WEIGHT: 278 LBS | SYSTOLIC BLOOD PRESSURE: 144 MMHG | OXYGEN SATURATION: 95 % | HEART RATE: 54 BPM | BODY MASS INDEX: 35.68 KG/M2

## 2018-06-18 VITALS
DIASTOLIC BLOOD PRESSURE: 70 MMHG | BODY MASS INDEX: 35.68 KG/M2 | WEIGHT: 278 LBS | SYSTOLIC BLOOD PRESSURE: 138 MMHG | HEART RATE: 56 BPM | HEIGHT: 74 IN | RESPIRATION RATE: 20 BRPM

## 2018-06-18 DIAGNOSIS — Z95.1 HX OF CABG: ICD-10-CM

## 2018-06-18 DIAGNOSIS — F33.2 SEVERE EPISODE OF RECURRENT MAJOR DEPRESSIVE DISORDER, WITHOUT PSYCHOTIC FEATURES (HCC): Chronic | ICD-10-CM

## 2018-06-18 DIAGNOSIS — E11.22 CONTROLLED TYPE 2 DIABETES MELLITUS WITH CHRONIC KIDNEY DISEASE, WITHOUT LONG-TERM CURRENT USE OF INSULIN, UNSPECIFIED CKD STAGE (HCC): ICD-10-CM

## 2018-06-18 DIAGNOSIS — I10 ESSENTIAL HYPERTENSION: ICD-10-CM

## 2018-06-18 DIAGNOSIS — I48.0 PAROXYSMAL ATRIAL FIBRILLATION (HCC): ICD-10-CM

## 2018-06-18 DIAGNOSIS — E78.2 MIXED HYPERLIPIDEMIA: ICD-10-CM

## 2018-06-18 DIAGNOSIS — I50.22 CHRONIC SYSTOLIC CONGESTIVE HEART FAILURE, NYHA CLASS 2 (HCC): Primary | ICD-10-CM

## 2018-06-18 DIAGNOSIS — I25.810 CORONARY ARTERY DISEASE INVOLVING CORONARY BYPASS GRAFT OF NATIVE HEART WITHOUT ANGINA PECTORIS: ICD-10-CM

## 2018-06-18 DIAGNOSIS — N32.89 BLADDER WALL THICKENING: ICD-10-CM

## 2018-06-18 DIAGNOSIS — E11.21 DIABETIC NEPHROPATHY WITH PROTEINURIA (HCC): ICD-10-CM

## 2018-06-18 DIAGNOSIS — Z79.01 ANTICOAGULATED ON COUMADIN: ICD-10-CM

## 2018-06-18 DIAGNOSIS — Z95.810 ICD (IMPLANTABLE CARDIOVERTER-DEFIBRILLATOR) IN PLACE: ICD-10-CM

## 2018-06-18 DIAGNOSIS — R31.29 MICROSCOPIC HEMATURIA: Primary | ICD-10-CM

## 2018-06-18 DIAGNOSIS — R53.83 FATIGUE, UNSPECIFIED TYPE: ICD-10-CM

## 2018-06-18 DIAGNOSIS — N18.30 CKD (CHRONIC KIDNEY DISEASE), STAGE III (HCC): Primary | ICD-10-CM

## 2018-06-18 DIAGNOSIS — D33.4 SCHWANNOMA OF SPINAL CORD (HCC): ICD-10-CM

## 2018-06-18 PROCEDURE — 99215 OFFICE O/P EST HI 40 MIN: CPT | Performed by: NURSE PRACTITIONER

## 2018-06-18 PROCEDURE — 99214 OFFICE O/P EST MOD 30 MIN: CPT | Performed by: INTERNAL MEDICINE

## 2018-06-18 PROCEDURE — 99999 PR OFFICE/OUTPT VISIT,PROCEDURE ONLY: CPT | Performed by: UROLOGY

## 2018-06-18 PROCEDURE — 52000 CYSTOURETHROSCOPY: CPT | Performed by: UROLOGY

## 2018-06-18 RX ORDER — ASPIRIN 81 MG/1
81 TABLET ORAL DAILY
COMMUNITY
End: 2018-12-14

## 2018-06-18 RX ORDER — METOLAZONE 5 MG/1
5 TABLET ORAL DAILY
Qty: 3 TABLET | Refills: 0 | Status: SHIPPED | OUTPATIENT
Start: 2018-06-18 | End: 2018-07-24

## 2018-06-19 ENCOUNTER — TELEPHONE (OUTPATIENT)
Dept: UROLOGY | Age: 53
End: 2018-06-19

## 2018-06-19 ENCOUNTER — TELEPHONE (OUTPATIENT)
Dept: NEUROSURGERY | Age: 53
End: 2018-06-19

## 2018-06-19 ENCOUNTER — TELEPHONE (OUTPATIENT)
Dept: CARDIOLOGY CLINIC | Age: 53
End: 2018-06-19

## 2018-06-19 DIAGNOSIS — N49.2 SCROTAL ABSCESS: Primary | ICD-10-CM

## 2018-06-19 RX ORDER — CEFDINIR 300 MG/1
300 CAPSULE ORAL 2 TIMES DAILY
Qty: 20 CAPSULE | Refills: 0 | Status: SHIPPED | OUTPATIENT
Start: 2018-06-19 | End: 2018-06-29

## 2018-06-19 ASSESSMENT — ENCOUNTER SYMPTOMS
COUGH: 0
ABDOMINAL PAIN: 0
SHORTNESS OF BREATH: 0
BACK PAIN: 1
NAUSEA: 0

## 2018-06-20 ENCOUNTER — TELEPHONE (OUTPATIENT)
Dept: NEUROSURGERY | Age: 53
End: 2018-06-20

## 2018-06-20 ENCOUNTER — PROCEDURE VISIT (OUTPATIENT)
Dept: CARDIOLOGY CLINIC | Age: 53
End: 2018-06-20
Payer: COMMERCIAL

## 2018-06-20 DIAGNOSIS — I50.22 CHRONIC SYSTOLIC CONGESTIVE HEART FAILURE (HCC): Primary | ICD-10-CM

## 2018-06-20 DIAGNOSIS — Z95.810 ICD (IMPLANTABLE CARDIOVERTER-DEFIBRILLATOR) IN PLACE: ICD-10-CM

## 2018-06-20 PROCEDURE — 93297 REM INTERROG DEV EVAL ICPMS: CPT | Performed by: NUCLEAR MEDICINE

## 2018-06-20 PROCEDURE — 93299 PR REM INTERROG ICPMS/SCRMS <30 D TECH REVIEW: CPT | Performed by: NUCLEAR MEDICINE

## 2018-06-21 ENCOUNTER — TELEPHONE (OUTPATIENT)
Dept: FAMILY MEDICINE CLINIC | Age: 53
End: 2018-06-21

## 2018-07-03 DIAGNOSIS — I50.22 CHRONIC SYSTOLIC CONGESTIVE HEART FAILURE (HCC): ICD-10-CM

## 2018-07-03 RX ORDER — POTASSIUM CHLORIDE 20 MEQ/1
TABLET, EXTENDED RELEASE ORAL
Qty: 180 TABLET | Refills: 1 | Status: SHIPPED | OUTPATIENT
Start: 2018-07-03 | End: 2018-09-06 | Stop reason: SDUPTHER

## 2018-07-17 ENCOUNTER — TELEPHONE (OUTPATIENT)
Dept: FAMILY MEDICINE CLINIC | Age: 53
End: 2018-07-17

## 2018-07-17 DIAGNOSIS — D68.9 COAGULOPATHY (HCC): Primary | ICD-10-CM

## 2018-07-17 DIAGNOSIS — I48.0 PAROXYSMAL ATRIAL FIBRILLATION (HCC): ICD-10-CM

## 2018-07-17 DIAGNOSIS — Z79.01 ANTICOAGULATED ON COUMADIN: ICD-10-CM

## 2018-07-17 NOTE — TELEPHONE ENCOUNTER
Received letter from [address] [place] [address] that he has been discharged due to no-show and INR checks. He still taking Coumadin? Dosing?

## 2018-07-18 ENCOUNTER — ANTI-COAG VISIT (OUTPATIENT)
Dept: FAMILY MEDICINE CLINIC | Age: 53
End: 2018-07-18

## 2018-07-18 ENCOUNTER — HOSPITAL ENCOUNTER (OUTPATIENT)
Age: 53
Discharge: HOME | End: 2018-07-18
Payer: COMMERCIAL

## 2018-07-18 DIAGNOSIS — D68.9 COAGULOPATHY (HCC): ICD-10-CM

## 2018-07-18 DIAGNOSIS — Z79.01 ANTICOAGULATED ON COUMADIN: ICD-10-CM

## 2018-07-18 DIAGNOSIS — I48.0 PAROXYSMAL ATRIAL FIBRILLATION (HCC): ICD-10-CM

## 2018-07-18 LAB — INR BLD: 1.73 (ref 0.85–1.13)

## 2018-07-18 PROCEDURE — 85610 PROTHROMBIN TIME: CPT

## 2018-07-18 PROCEDURE — 36415 COLL VENOUS BLD VENIPUNCTURE: CPT

## 2018-07-18 NOTE — TELEPHONE ENCOUNTER
If we cannot discuss other options then we need to refer him to [facility].

## 2018-07-19 ENCOUNTER — TELEPHONE (OUTPATIENT)
Dept: FAMILY MEDICINE CLINIC | Age: 53
End: 2018-07-19

## 2018-07-19 ENCOUNTER — TELEPHONE (OUTPATIENT)
Dept: NEUROSURGERY | Age: 53
End: 2018-07-19

## 2018-07-19 DIAGNOSIS — I48.0 PAROXYSMAL ATRIAL FIBRILLATION (HCC): Primary | ICD-10-CM

## 2018-07-19 DIAGNOSIS — D49.2 LUMBAR SPINE TUMOR: ICD-10-CM

## 2018-07-19 DIAGNOSIS — D33.4 SCHWANNOMA OF SPINAL CORD (HCC): Primary | ICD-10-CM

## 2018-07-19 RX ORDER — WARFARIN SODIUM 5 MG/1
TABLET ORAL
Qty: 30 TABLET | Refills: 0 | Status: ON HOLD | OUTPATIENT
Start: 2018-07-19 | End: 2019-09-24 | Stop reason: SDUPTHER

## 2018-07-19 NOTE — TELEPHONE ENCOUNTER
Patient is calling in because he will be switching from the [facility] coumadin clinic to the [address] [place] [address], and has an appointment at [facility] on 7/26/18. But he is just about out of his warfarin and the [facility] clinic will not prescribe it. He was taking 5 mg tablet, taking half tablet daily except on Wednesdays he was taking the full 5 mg tablet. He was asking if Mannie León would prescribe his warafarin until he got in at the [facility] clinic. [facility] [address] [place] [address] Drive, [address], please advise.

## 2018-07-19 NOTE — TELEPHONE ENCOUNTER
Pt called in about his surgery. He had spoken with [place] in June and told her then that he wanted to postpone surgery until August.  He states he is not having any pain and does not have sufficient time at work to take off for the surgery to be in August.  He wants you opinion if the surgery can wait longer. He wants to know if he follow with imaging to see if there have been any changes or if he is stable. Please advise your recommendations.   Thank you

## 2018-07-23 ENCOUNTER — OFFICE VISIT (OUTPATIENT)
Dept: FAMILY MEDICINE CLINIC | Age: 53
End: 2018-07-23
Payer: COMMERCIAL

## 2018-07-23 VITALS
HEIGHT: 74 IN | WEIGHT: 266.4 LBS | HEART RATE: 65 BPM | RESPIRATION RATE: 13 BRPM | OXYGEN SATURATION: 98 % | DIASTOLIC BLOOD PRESSURE: 80 MMHG | SYSTOLIC BLOOD PRESSURE: 130 MMHG | BODY MASS INDEX: 34.19 KG/M2

## 2018-07-23 DIAGNOSIS — I50.22 CHRONIC SYSTOLIC CONGESTIVE HEART FAILURE (HCC): ICD-10-CM

## 2018-07-23 DIAGNOSIS — E11.22 CONTROLLED TYPE 2 DIABETES MELLITUS WITH CHRONIC KIDNEY DISEASE, WITHOUT LONG-TERM CURRENT USE OF INSULIN, UNSPECIFIED CKD STAGE (HCC): ICD-10-CM

## 2018-07-23 DIAGNOSIS — R79.1 SUBTHERAPEUTIC INTERNATIONAL NORMALIZED RATIO (INR): Primary | ICD-10-CM

## 2018-07-23 DIAGNOSIS — I48.0 PAROXYSMAL ATRIAL FIBRILLATION (HCC): ICD-10-CM

## 2018-07-23 PROCEDURE — 99213 OFFICE O/P EST LOW 20 MIN: CPT | Performed by: NURSE PRACTITIONER

## 2018-07-23 NOTE — PATIENT INSTRUCTIONS
Xarelto or Eliquis - blood thinning options to replace coumadin    You may receive a survey about your visit with us today. The feedback from our patients helps us identify what is working well and where the service to all patients can be enhanced. Thank you!

## 2018-07-24 ENCOUNTER — OFFICE VISIT (OUTPATIENT)
Dept: CARDIOLOGY CLINIC | Age: 53
End: 2018-07-24
Payer: COMMERCIAL

## 2018-07-24 ENCOUNTER — HOSPITAL ENCOUNTER (OUTPATIENT)
Age: 53
Discharge: HOME | End: 2018-07-24
Payer: COMMERCIAL

## 2018-07-24 ENCOUNTER — HOSPITAL ENCOUNTER (OUTPATIENT)
Dept: ULTRASOUND IMAGING | Age: 53
Discharge: HOME | End: 2018-07-24
Payer: COMMERCIAL

## 2018-07-24 VITALS
BODY MASS INDEX: 34.14 KG/M2 | OXYGEN SATURATION: 96 % | SYSTOLIC BLOOD PRESSURE: 138 MMHG | WEIGHT: 266 LBS | DIASTOLIC BLOOD PRESSURE: 76 MMHG | HEIGHT: 74 IN | HEART RATE: 55 BPM

## 2018-07-24 DIAGNOSIS — I50.42 CHF (CONGESTIVE HEART FAILURE), NYHA CLASS II, CHRONIC, COMBINED (HCC): ICD-10-CM

## 2018-07-24 DIAGNOSIS — I50.42 CHF (CONGESTIVE HEART FAILURE), NYHA CLASS II, CHRONIC, COMBINED (HCC): Primary | ICD-10-CM

## 2018-07-24 DIAGNOSIS — N49.2 SCROTAL ABSCESS: ICD-10-CM

## 2018-07-24 LAB
ANION GAP SERPL CALCULATED.3IONS-SCNC: 15 MEQ/L (ref 8–16)
BUN BLDV-MCNC: 23 MG/DL (ref 7–22)
CALCIUM SERPL-MCNC: 9.5 MG/DL (ref 8.5–10.5)
CHLORIDE BLD-SCNC: 100 MEQ/L (ref 98–111)
CO2: 23 MEQ/L (ref 23–33)
CREAT SERPL-MCNC: 1.6 MG/DL (ref 0.4–1.2)
GFR SERPL CREATININE-BSD FRML MDRD: 45 ML/MIN/1.73M2
GLUCOSE BLD-MCNC: 76 MG/DL (ref 70–108)
POTASSIUM SERPL-SCNC: 4.2 MEQ/L (ref 3.5–5.2)
SODIUM BLD-SCNC: 138 MEQ/L (ref 135–145)

## 2018-07-24 PROCEDURE — 80048 BASIC METABOLIC PNL TOTAL CA: CPT

## 2018-07-24 PROCEDURE — 76870 US EXAM SCROTUM: CPT

## 2018-07-24 PROCEDURE — 36415 COLL VENOUS BLD VENIPUNCTURE: CPT

## 2018-07-24 PROCEDURE — 99213 OFFICE O/P EST LOW 20 MIN: CPT | Performed by: NURSE PRACTITIONER

## 2018-07-24 ASSESSMENT — ENCOUNTER SYMPTOMS
BACK PAIN: 1
SHORTNESS OF BREATH: 0
SHORTNESS OF BREATH: 0
NAUSEA: 0
ABDOMINAL PAIN: 0
ABDOMINAL PAIN: 0
CHEST TIGHTNESS: 0
COLOR CHANGE: 0
WHEEZING: 0
COUGH: 0
COUGH: 0
ABDOMINAL DISTENTION: 0
APNEA: 0
NAUSEA: 0

## 2018-07-24 NOTE — PATIENT INSTRUCTIONS
Continue:  · Continue current medications  · Daily weights and record  · Fluid restriction of 2 Liters per day  · Limit sodium in diet to around 7161-3482 mg/day  · Monitor BP  · Activity as tolerated     Call the Heart Failure Clinic for any of the following symptoms: [phone]  Weight gain of 2-3 pounds in 1 day or 5 pounds in 1 week  Increased shortness of breath  Shortness of breath while laying down  Cough  Chest pain  Swelling in feet, ankles or legs  Tenderness or bloating in the abdomen  Fatigue   Decreased appetite or nausea   Confusion
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
## 2018-07-25 ENCOUNTER — TELEPHONE (OUTPATIENT)
Dept: NEUROSURGERY | Age: 53
End: 2018-07-25

## 2018-07-25 ENCOUNTER — TELEPHONE (OUTPATIENT)
Dept: UROLOGY | Age: 53
End: 2018-07-25

## 2018-07-25 ENCOUNTER — TELEPHONE (OUTPATIENT)
Dept: CARDIOLOGY CLINIC | Age: 53
End: 2018-07-25

## 2018-07-25 DIAGNOSIS — N49.2 SCROTAL ABSCESS: Primary | ICD-10-CM

## 2018-07-26 ENCOUNTER — TELEPHONE (OUTPATIENT)
Dept: CARDIOLOGY CLINIC | Age: 53
End: 2018-07-26

## 2018-07-26 ENCOUNTER — PROCEDURE VISIT (OUTPATIENT)
Dept: CARDIOLOGY CLINIC | Age: 53
End: 2018-07-26
Payer: COMMERCIAL

## 2018-07-26 DIAGNOSIS — I50.22 CHRONIC SYSTOLIC CONGESTIVE HEART FAILURE, NYHA CLASS 2 (HCC): Primary | ICD-10-CM

## 2018-07-26 PROCEDURE — 93297 REM INTERROG DEV EVAL ICPMS: CPT | Performed by: NUCLEAR MEDICINE

## 2018-07-30 RX ORDER — FUROSEMIDE 40 MG/1
40 TABLET ORAL DAILY
COMMUNITY
End: 2018-10-29 | Stop reason: SDUPTHER

## 2018-07-30 NOTE — TELEPHONE ENCOUNTER
Spoke with patient   His lasix bottle from Dr. Vianca Lopez Lasix 40 mg daily  He did not change his Lasix to every 48 hr  when he was discharged from hospital back in May.  He has been getting his Rx from [address]

## 2018-08-10 ENCOUNTER — HOSPITAL ENCOUNTER (OUTPATIENT)
Dept: MRI IMAGING | Age: 53
Discharge: HOME | End: 2018-08-10
Payer: COMMERCIAL

## 2018-08-10 DIAGNOSIS — D33.4 SCHWANNOMA OF SPINAL CORD (HCC): ICD-10-CM

## 2018-08-10 DIAGNOSIS — D49.2 LUMBAR SPINE TUMOR: ICD-10-CM

## 2018-08-10 PROCEDURE — 6360000004 HC RX CONTRAST MEDICATION: Performed by: NEUROLOGICAL SURGERY

## 2018-08-10 PROCEDURE — 72197 MRI PELVIS W/O & W/DYE: CPT

## 2018-08-10 PROCEDURE — A9579 GAD-BASE MR CONTRAST NOS,1ML: HCPCS | Performed by: NEUROLOGICAL SURGERY

## 2018-08-10 PROCEDURE — 72158 MRI LUMBAR SPINE W/O & W/DYE: CPT

## 2018-08-10 RX ADMIN — GADOTERIDOL 20 ML: 279.3 INJECTION, SOLUTION INTRAVENOUS at 15:20

## 2018-08-21 ENCOUNTER — TELEPHONE (OUTPATIENT)
Dept: NEUROSURGERY | Age: 53
End: 2018-08-21

## 2018-08-30 ENCOUNTER — TELEPHONE (OUTPATIENT)
Dept: CARDIOLOGY CLINIC | Age: 53
End: 2018-08-30

## 2018-08-31 ENCOUNTER — PROCEDURE VISIT (OUTPATIENT)
Dept: CARDIOLOGY CLINIC | Age: 53
End: 2018-08-31
Payer: COMMERCIAL

## 2018-08-31 DIAGNOSIS — Z95.810 ICD (IMPLANTABLE CARDIOVERTER-DEFIBRILLATOR) IN PLACE: Primary | ICD-10-CM

## 2018-08-31 PROCEDURE — 93295 DEV INTERROG REMOTE 1/2/MLT: CPT | Performed by: INTERNAL MEDICINE

## 2018-08-31 PROCEDURE — 93296 REM INTERROG EVL PM/IDS: CPT | Performed by: INTERNAL MEDICINE

## 2018-08-31 NOTE — PROGRESS NOTES
DR Schmidt Lav PT   MEDTRONIC DUAL ICD REMOTE TRANSMISSION   BATTERY 7.6 YRS REMAINING  ATRIAL IMPEDENCE 437  RV IMPEDENCE 380  RV 54  P WAVES 1.4  RV WAVES 8.6  DDI 40  A PACED <0.1%  V PACED <0.1%  OPTIVOL WNL

## 2018-09-06 ENCOUNTER — OFFICE VISIT (OUTPATIENT)
Dept: CARDIOLOGY CLINIC | Age: 53
End: 2018-09-06
Payer: COMMERCIAL

## 2018-09-06 VITALS
WEIGHT: 261 LBS | BODY MASS INDEX: 33.5 KG/M2 | SYSTOLIC BLOOD PRESSURE: 110 MMHG | HEART RATE: 64 BPM | DIASTOLIC BLOOD PRESSURE: 70 MMHG | HEIGHT: 74 IN

## 2018-09-06 DIAGNOSIS — I10 ESSENTIAL HYPERTENSION: Primary | ICD-10-CM

## 2018-09-06 DIAGNOSIS — Z95.810 ICD (IMPLANTABLE CARDIOVERTER-DEFIBRILLATOR) IN PLACE: ICD-10-CM

## 2018-09-06 DIAGNOSIS — I50.22 CHRONIC SYSTOLIC CONGESTIVE HEART FAILURE (HCC): ICD-10-CM

## 2018-09-06 DIAGNOSIS — I42.0 DILATED CARDIOMYOPATHY (HCC): ICD-10-CM

## 2018-09-06 PROCEDURE — 99213 OFFICE O/P EST LOW 20 MIN: CPT | Performed by: NUCLEAR MEDICINE

## 2018-09-06 RX ORDER — POTASSIUM CHLORIDE 20 MEQ/1
TABLET, EXTENDED RELEASE ORAL
Qty: 180 TABLET | Refills: 1 | Status: SHIPPED | OUTPATIENT
Start: 2018-09-06 | End: 2018-11-26 | Stop reason: ALTCHOICE

## 2018-09-06 NOTE — PROGRESS NOTES
[address]  [address]  [place] [address]  Dept: [phone]  Dept Fax: [phone]  Loc: [phone]    Visit Date: 9/6/2018    Vila Brunner is a 48 y.o. 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    Arthritis     Atrial fibrillation ([address]     CAD (coronary artery disease) 3/25/2014    s/p CABG in may 2014    Cardiomyopathy ([address]     Chronic kidney disease     Depression     Diabetes mellitus ([address]     Drug abuse     hx of cacaine abuse, stopped abusing drugs in 2012    GERD (gastroesophageal reflux disease)     H/O cardiac catheterization 4/30/2014    Hyperlipidemia     Hypertension     Lumbar spine tumor     Neuromuscular disorder ([address]     Other disorders of kidney and ureter in diseases classified elsewhere     Paroxysmal atrial fibrillation ([address] 7/22/2014    V [facility]ach [facility]     V-tach [facility])     s/p ablation in dec 2014      Past Surgical History:   Procedure Laterality Date    CARDIAC [facility]HETERIZ[facility]  3/20/14     [facility]    CARDIAC DEFIBRILLATOR PLACEMENT  10/13/2015    [employer] [place], MRI CONDITIONAL ICD    CORONARY ARTERY BYPASS GRAFT  5-9-14    3 bypass    EKG 12-LEAD  7/17/2015         OTHER SURGICAL HISTORY Left 10/21/14    Left Bursectomy, I & D Left Elbow - Dr. Brissa Schwarz [facility] N[facility]A 1/16/2018    LUMBAR AND SACRAL LAMINECTOMY, REMOVAL OF INTRASPINAL TUMORS performed by Andrea Wade MD at [address] harvest from legs     Family History   Problem Relation Age of Onset    Diabetes Mother     High Blood Pressure Mother     Stroke Mother     Diabetes Father     Heart Disease Father     High Blood Pressure Father     Heart Disease Sister         CABG    Diabetes Maternal Grandmother     Diabetes Maternal Grandfather     Heart Disease Paternal Grandfather      Social History   Substance Use Topics    Smoking status: Current Every Day Smoker     Packs/day: 1.00     Years: 32.00     Types: Cigarettes     Start date: 7/16/1980    Smokeless tobacco: Former User    Alcohol use No      Current Outpatient Prescriptions   Medication Sig Dispense Refill    furosemide (LASIX) 40 MG tablet Take 40 mg by mouth daily      warfarin (COUMADIN) 5 MG tablet Take 1/2 tab Daily except Wednesday take 1 tab (Patient taking differently: Take 1/2 tab Daily except Wednesday take 1 tab. Dosed by [facility]) 30 tablet 0    potassium chloride (KLOR-CON M) 20 MEQ extended release tablet TAKE 1 TABLET TWICE A  tablet 1    aspirin 81 MG EC tablet Take 81 mg by mouth daily      ticagrelor (BRILINTA) 90 MG TABS tablet Take 1 tablet by mouth 2 times daily 180 tablet 3    sacubitril-valsartan (ENTRESTO) 49-51 MG per tablet TAKE 1 TABLET TWICE A DAY  DO NOT RESUME UNTIL CLEARED BY DR SCHAEFFER 180 tablet 2    ALPRAZolam (XANAX) 0.5 MG tablet Take 1 OR 2 tab HS for anxiety. 30 tablet 5    isosorbide mononitrate (IMDUR) 60 MG extended release tablet Take 1 tablet by mouth daily 90 tablet 2    nicotine (NICODERM CQ) 21 MG/24HR Place 1 patch onto the skin every 24 hours May wear 24 hours. May remove at bedtime if problems with insomnia. Re-Apply new patch immediately on awakening.   Diagnosis: Tobacco Dependence 30 patch 0    nitroGLYCERIN (NITROSTAT) 0.4 MG SL tablet Place 1 tablet under the tongue every 5 minutes as needed for Chest pain X 3 doses. If chest pain continues seek medical attention 25 tablet 3    atorvastatin (LIPITOR) 40 MG tablet Take 1 tablet by mouth daily 90 tablet 3    baclofen (LIORESAL) 20 MG tablet Take 1 tablet by mouth 3 times daily 180 tablet 3    amiodarone (CORDARONE) 200 MG tablet TAKE 1 TABLET BY MOUTH TWICE DAILY 180 tablet 3    hydrALAZINE (APRESOLINE) 50 MG tablet Take 1 tablet by mouth every 8 hours 240 tablet 2    linagliptin (TRADJENTA) 5 MG tablet Take 1 tablet by mouth daily 30 tablet 0    tamsulosin (FLOMAX) 0.4 MG capsule Take 1 capsule by mouth daily 90 capsule 3    finasteride (PROSCAR) 5 MG tablet Take 1 tab Daily 90 tablet 3    mexiletine (MEXITIL) 150 MG capsule TAKE 2 CAPSULES THREE TIMES A DAY FOR ATRIAL FIBRILLATION 540 capsule 3    metoprolol tartrate (LOPRESSOR) 50 MG tablet TAKE 1 TABLET THREE TIMES A DAY FOR ATRIAL FIBRILLATION 270 tablet 3    glucose blood VI test strips (PHIL CONTOUR TEST) strip 1 each by In Vitro route daily 300 each 3    acetaminophen 650 MG TABS Take 650 mg by mouth every 4 hours as needed 120 tablet 3     No current facility-administered medications for this visit.       Allergies   Allergen Reactions    Pcn [Penicillins] Hives    Zoloft [Sertraline Hcl] Anxiety     Worsened anxiety     Health Maintenance   Topic Date Due    Pneumococcal highest risk (1 of 3 - PCV13) 05/20/1984    Colon cancer screen colonoscopy  05/20/2015    Diabetic retinal exam  12/15/2016    Diabetic foot exam  07/07/2018    Flu vaccine (1) 01/04/2019 (Originally 9/1/2018)    A1C test (Diabetic or Prediabetic)  05/14/2019    Lipid screen  05/14/2019    TSH testing  05/14/2019    Diabetic microalbuminuria test  05/18/2019    Potassium monitoring  07/24/2019    Creatinine monitoring  07/24/2019    DTaP/Tdap/Td vaccine (2 - Td) 06/25/2027    Hepatitis C screen  Addressed    HIV screen  Addressed       Subjective:  Review of Systems  General:   No fever, no MD on 9/6/2018 at 1:05 PM

## 2018-09-08 NOTE — PROGRESS NOTES
[facility]  Anticoagulation Clinic  [phone] (phone)  [phone] (fax)  Mr. Angie Kirkpatrick is a 46 y.o.  male with history of Afib who presents today for anticoagulation monitoring and adjustment. Pt restarted Coumadin at 5mg daily on 1/31/18  Patient verifies current dosing regimen and tablet strength. No missed or extra doses. Patient denies s/s bleeding/bruising/swelling/SOB/chest pain  No blood in urine or stool. No dietary changes. No changes in medication/OTC agents/Herbals. No change in alcohol use or tobacco use. No change in activity level. Patient denies headaches/dizziness/lightheadedness/falls. No vomiting/diarrhea or acute illness. No Procedures scheduled in the future at this time. Dr. Carol Bunn to do a back surgery in about 5 weeks. Assessment:   Lab Results   Component Value Date    INR 2.60 (H) 02/05/2018    INR 0.98 12/29/2017    INR 0.97 12/28/2017     INR therapeutic   Recent Labs      02/05/18   1515   INR  2.60*         Plan:  Continue Coumadin 5mg MF, 2.5mg TWTHSS (previous dose of Coumadin). Recheck INR 10 days. Patient reminded to call the Anticoagulation Clinic with any signs or symptoms of bleeding or with any medication changes. Patient given instructions utilizing the teach back method. Discharged ambulatory in no apparent distress. After visit summary printed and reviewed with patient.       Medications reviewed and updated on home medication list Yes    Influenza vaccine:     [] given    [] declined   [] received previously   [] plans to receive at a later time   [] refused    [x] documented in EPIC
No

## 2018-09-19 ENCOUNTER — HOSPITAL ENCOUNTER (OUTPATIENT)
Age: 53
Discharge: HOME | End: 2018-09-19
Payer: COMMERCIAL

## 2018-09-19 DIAGNOSIS — N18.30 CKD (CHRONIC KIDNEY DISEASE), STAGE III (HCC): ICD-10-CM

## 2018-09-19 DIAGNOSIS — I10 ESSENTIAL HYPERTENSION: ICD-10-CM

## 2018-09-19 DIAGNOSIS — E11.21 DIABETIC NEPHROPATHY WITH PROTEINURIA (HCC): ICD-10-CM

## 2018-09-19 LAB
ALBUMIN SERPL-MCNC: 4.2 G/DL (ref 3.5–5.1)
ALP BLD-CCNC: 63 U/L (ref 38–126)
ALT SERPL-CCNC: 56 U/L (ref 11–66)
ANION GAP SERPL CALCULATED.3IONS-SCNC: 13 MEQ/L (ref 8–16)
AST SERPL-CCNC: 34 U/L (ref 5–40)
BACTERIA: ABNORMAL
BILIRUB SERPL-MCNC: 0.6 MG/DL (ref 0.3–1.2)
BILIRUBIN URINE: NEGATIVE
BLOOD, URINE: NEGATIVE
BUN BLDV-MCNC: 27 MG/DL (ref 7–22)
CALCIUM SERPL-MCNC: 8.8 MG/DL (ref 8.5–10.5)
CASTS: ABNORMAL /LPF
CASTS: ABNORMAL /LPF
CHARACTER, URINE: CLEAR
CHLORIDE BLD-SCNC: 104 MEQ/L (ref 98–111)
CO2: 22 MEQ/L (ref 23–33)
COLOR: YELLOW
CREAT SERPL-MCNC: 1.8 MG/DL (ref 0.4–1.2)
CREATININE URINE: 102.4 MG/DL
CREATININE, URINE: 102.4 MG/DL
CRYSTALS: ABNORMAL
EPITHELIAL CELLS, UA: ABNORMAL /HPF
GFR SERPL CREATININE-BSD FRML MDRD: 40 ML/MIN/1.73M2
GLUCOSE BLD-MCNC: 133 MG/DL (ref 70–108)
GLUCOSE, URINE: 100 MG/DL
HEPATITIS B SURFACE ANTIGEN: NEGATIVE
HEPATITIS C ANTIBODY: NEGATIVE
KETONES, URINE: NEGATIVE
LEUKOCYTE EST, POC: NEGATIVE
MICROALBUMIN UR-MCNC: 87.45 MG/DL
MICROALBUMIN/CREAT UR-RTO: 854 MG/G (ref 0–30)
MISCELLANEOUS LAB TEST RESULT: ABNORMAL
NITRITE, URINE: NEGATIVE
PH UA: 5
POTASSIUM SERPL-SCNC: 4.1 MEQ/L (ref 3.5–5.2)
PROT/CREAT RATIO, UR: 1.31
PROTEIN UA: 100 MG/DL
PROTEIN, URINE: 134.2 MG/DL
RBC URINE: ABNORMAL /HPF
RENAL EPITHELIAL, UA: ABNORMAL
SODIUM BLD-SCNC: 139 MEQ/L (ref 135–145)
SPECIFIC GRAVITY UA: 1.02 (ref 1–1.03)
TOTAL PROTEIN: 6.7 G/DL (ref 6.1–8)
UROBILINOGEN, URINE: 0.2 EU/DL
WBC UA: ABNORMAL /HPF
YEAST: ABNORMAL

## 2018-09-19 PROCEDURE — 84156 ASSAY OF PROTEIN URINE: CPT

## 2018-09-19 PROCEDURE — 83883 ASSAY NEPHELOMETRY NOT SPEC: CPT

## 2018-09-19 PROCEDURE — 82570 ASSAY OF URINE CREATININE: CPT

## 2018-09-19 PROCEDURE — 86160 COMPLEMENT ANTIGEN: CPT

## 2018-09-19 PROCEDURE — 80053 COMPREHEN METABOLIC PANEL: CPT

## 2018-09-19 PROCEDURE — 84160 ASSAY OF PROTEIN ANY SOURCE: CPT

## 2018-09-19 PROCEDURE — 87340 HEPATITIS B SURFACE AG IA: CPT

## 2018-09-19 PROCEDURE — 82043 UR ALBUMIN QUANTITATIVE: CPT

## 2018-09-19 PROCEDURE — 86803 HEPATITIS C AB TEST: CPT

## 2018-09-19 PROCEDURE — 81001 URINALYSIS AUTO W/SCOPE: CPT

## 2018-09-19 PROCEDURE — 86334 IMMUNOFIX E-PHORESIS SERUM: CPT

## 2018-09-19 PROCEDURE — 84165 PROTEIN E-PHORESIS SERUM: CPT

## 2018-09-19 PROCEDURE — 36415 COLL VENOUS BLD VENIPUNCTURE: CPT

## 2018-09-19 PROCEDURE — 86038 ANTINUCLEAR ANTIBODIES: CPT

## 2018-09-21 LAB
C3 COMPLEMENT: 116 MG/DL (ref 88–201)
C4 COMPLEMENT: 32 MG/DL (ref 10–40)

## 2018-09-22 LAB — ANA SCREEN: NORMAL

## 2018-09-23 LAB
IMMUNOFIXATION RESULT, SERUM: NORMAL
PROTEIN ELECTROPHORESIS, SERUM: NORMAL

## 2018-09-25 ENCOUNTER — OFFICE VISIT (OUTPATIENT)
Dept: NEPHROLOGY | Age: 53
End: 2018-09-25
Payer: COMMERCIAL

## 2018-09-25 VITALS
BODY MASS INDEX: 34.15 KG/M2 | HEART RATE: 53 BPM | SYSTOLIC BLOOD PRESSURE: 165 MMHG | WEIGHT: 266 LBS | DIASTOLIC BLOOD PRESSURE: 76 MMHG

## 2018-09-25 DIAGNOSIS — I10 ESSENTIAL HYPERTENSION: ICD-10-CM

## 2018-09-25 DIAGNOSIS — N18.30 CKD (CHRONIC KIDNEY DISEASE), STAGE III (HCC): ICD-10-CM

## 2018-09-25 DIAGNOSIS — N17.9 AKI (ACUTE KIDNEY INJURY) (HCC): Primary | ICD-10-CM

## 2018-09-25 DIAGNOSIS — I10 UNCONTROLLED HYPERTENSION: ICD-10-CM

## 2018-09-25 LAB — KAPPA/LAMBDA FREE LIGHT CHAINS: NORMAL

## 2018-09-25 PROCEDURE — 99213 OFFICE O/P EST LOW 20 MIN: CPT | Performed by: INTERNAL MEDICINE

## 2018-09-25 NOTE — PROGRESS NOTES
[facility]        Outpatient Follow-Up note         9/25/2018 3:42 PM    Patient Name:   Yury Conley:    1965  Primary Care Physician:  ISABELLA Rankin CNP     Chief Complaint / Reason for follow-up : Follow Up of CKD      Interval History :  Patient seen and examined by me. No cp or SOB. Feeling weak and tired. Taking entresto as well.     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    Arthritis     Atrial fibrillation ([address]     CAD (coronary artery disease) 3/25/2014    s/p CABG in may 2014    Cardiomyopathy ([address]     Chronic kidney disease     Depression     Diabetes mellitus ([address]     Drug abuse     hx of cacaine abuse, stopped abusing drugs in 2012    GERD (gastroesophageal reflux disease)     H/O cardiac catheterization 4/30/2014    Hyperlipidemia     Hypertension     Lumbar spine tumor     Neuromuscular disorder ([address]     Other disorders of kidney and ureter in diseases classified elsewhere     Paroxysmal atrial fibrillation ([address] 7/22/2014    V tach ([address]     V-tach [facility])     s/p ablation in dec 2014     Past Surgical History:   Procedure Laterality Date    C[facility]AC [facility]/14     [facility]    CARDIAC DEFIBRILLATOR PLACEMENT  10/13/2015    [employer] [place], MRI CONDITIONAL ICD    CORONARY ARTERY BYPASS GRAFT  5-9-14    3 bypass    EKG 12-LEAD  7/17/2015         OTHER SURGICAL HISTORY Left 10/21/14    Left Bursectomy, I & D Left Elbow - Dr. Coronado Brain LAMINECTOMY,>2

## 2018-10-02 NOTE — CONSULTS
Consult noted. Patient's chart reviewed. Awaiting medical stability and appropriateness for therapies, then will complete formal consult. Thank you! Paola Pratt NP    Agree with the above recommendation.     Neymar Adame MD regular/Hr in 70's

## 2018-10-04 ENCOUNTER — PROCEDURE VISIT (OUTPATIENT)
Dept: CARDIOLOGY CLINIC | Age: 53
End: 2018-10-04
Payer: COMMERCIAL

## 2018-10-04 DIAGNOSIS — I50.22 CHRONIC SYSTOLIC CONGESTIVE HEART FAILURE (HCC): Primary | ICD-10-CM

## 2018-10-04 PROCEDURE — 93299 PR REM INTERROG ICPMS/SCRMS <30 D TECH REVIEW: CPT | Performed by: NUCLEAR MEDICINE

## 2018-10-04 PROCEDURE — 93297 REM INTERROG DEV EVAL ICPMS: CPT | Performed by: NUCLEAR MEDICINE

## 2018-10-15 DIAGNOSIS — I10 ESSENTIAL HYPERTENSION: ICD-10-CM

## 2018-10-15 RX ORDER — HYDRALAZINE HYDROCHLORIDE 50 MG/1
TABLET, FILM COATED ORAL
Qty: 240 TABLET | Refills: 2 | Status: SHIPPED | OUTPATIENT
Start: 2018-10-15 | End: 2018-12-20 | Stop reason: SDUPTHER

## 2018-10-16 DIAGNOSIS — E11.22 CONTROLLED TYPE 2 DIABETES MELLITUS WITH CHRONIC KIDNEY DISEASE, WITHOUT LONG-TERM CURRENT USE OF INSULIN, UNSPECIFIED CKD STAGE (HCC): ICD-10-CM

## 2018-10-16 RX ORDER — LINAGLIPTIN 5 MG/1
TABLET, FILM COATED ORAL
Qty: 90 TABLET | Refills: 2 | Status: SHIPPED | OUTPATIENT
Start: 2018-10-16 | End: 2019-10-07 | Stop reason: SDUPTHER

## 2018-10-17 ENCOUNTER — TELEPHONE (OUTPATIENT)
Dept: NEPHROLOGY | Age: 53
End: 2018-10-17

## 2018-10-29 RX ORDER — FUROSEMIDE 40 MG/1
TABLET ORAL
Qty: 90 TABLET | Refills: 1 | Status: SHIPPED | OUTPATIENT
Start: 2018-10-29 | End: 2018-11-26 | Stop reason: ALTCHOICE

## 2018-10-29 RX ORDER — METOPROLOL TARTRATE 50 MG/1
TABLET, FILM COATED ORAL
Qty: 270 TABLET | Refills: 1 | Status: ON HOLD | OUTPATIENT
Start: 2018-10-29 | End: 2019-09-26 | Stop reason: SDUPTHER

## 2018-10-29 RX ORDER — MEXILETINE HYDROCHLORIDE 150 MG/1
CAPSULE ORAL
Qty: 540 CAPSULE | Refills: 1 | Status: SHIPPED | OUTPATIENT
Start: 2018-10-29 | End: 2019-05-28 | Stop reason: SDUPTHER

## 2018-11-05 ENCOUNTER — TELEPHONE (OUTPATIENT)
Dept: CARDIOLOGY CLINIC | Age: 53
End: 2018-11-05

## 2018-11-08 ENCOUNTER — PROCEDURE VISIT (OUTPATIENT)
Dept: CARDIOLOGY CLINIC | Age: 53
End: 2018-11-08
Payer: COMMERCIAL

## 2018-11-08 DIAGNOSIS — I50.22 CHRONIC SYSTOLIC CONGESTIVE HEART FAILURE (HCC): Primary | ICD-10-CM

## 2018-11-08 PROCEDURE — 93299 PR REM INTERROG ICPMS/SCRMS <30 D TECH REVIEW: CPT | Performed by: NUCLEAR MEDICINE

## 2018-11-08 PROCEDURE — 93297 REM INTERROG DEV EVAL ICPMS: CPT | Performed by: NUCLEAR MEDICINE

## 2018-11-26 ENCOUNTER — OFFICE VISIT (OUTPATIENT)
Dept: CARDIOLOGY CLINIC | Age: 53
End: 2018-11-26
Payer: COMMERCIAL

## 2018-11-26 VITALS
OXYGEN SATURATION: 98 % | HEART RATE: 67 BPM | WEIGHT: 274 LBS | BODY MASS INDEX: 35.16 KG/M2 | HEIGHT: 74 IN | DIASTOLIC BLOOD PRESSURE: 94 MMHG | SYSTOLIC BLOOD PRESSURE: 190 MMHG

## 2018-11-26 DIAGNOSIS — I50.42 CHF (CONGESTIVE HEART FAILURE), NYHA CLASS II, CHRONIC, COMBINED (HCC): Primary | ICD-10-CM

## 2018-11-26 PROCEDURE — 99213 OFFICE O/P EST LOW 20 MIN: CPT | Performed by: NURSE PRACTITIONER

## 2018-11-26 ASSESSMENT — ENCOUNTER SYMPTOMS
COLOR CHANGE: 0
APNEA: 0
NAUSEA: 0
COUGH: 0
ABDOMINAL DISTENTION: 0
WHEEZING: 0
SHORTNESS OF BREATH: 0
CHEST TIGHTNESS: 0
ABDOMINAL PAIN: 0

## 2018-11-26 NOTE — PROGRESS NOTES
tab (Patient taking differently: Take 1/2 tab Daily except Wednesday take 1 tab. Dosed by [facility]) 30 tablet 0    aspirin 81 MG EC tablet Take 81 mg by mouth daily      ticagrelor (BRILINTA) 90 MG TABS tablet Take 1 tablet by mouth 2 times daily 180 tablet 3    sacubitril-valsartan (ENTRESTO) 49-51 MG per tablet TAKE 1 TABLET TWICE A DAY  DO NOT RESUME UNTIL CLEARED BY DR SCHAEFFER 180 tablet 2    isosorbide mononitrate (IMDUR) 60 MG extended release tablet Take 1 tablet by mouth daily 90 tablet 2    nicotine (NICODERM CQ) 21 MG/24HR Place 1 patch onto the skin every 24 hours May wear 24 hours. May remove at bedtime if problems with insomnia. Re-Apply new patch immediately on awakening. Diagnosis: Tobacco Dependence 30 patch 0    nitroGLYCERIN (NITROSTAT) 0.4 MG SL tablet Place 1 tablet under the tongue every 5 minutes as needed for Chest pain X 3 doses. If chest pain continues seek medical attention 25 tablet 3    atorvastatin (LIPITOR) 40 MG tablet Take 1 tablet by mouth daily 90 tablet 3    baclofen (LIORESAL) 20 MG tablet Take 1 tablet by mouth 3 times daily 180 tablet 3    amiodarone (CORDARONE) 200 MG tablet TAKE 1 TABLET BY MOUTH TWICE DAILY 180 tablet 3    linagliptin (TRADJENTA) 5 MG tablet Take 1 tablet by mouth daily 30 tablet 0    tamsulosin (FLOMAX) 0.4 MG capsule Take 1 capsule by mouth daily 90 capsule 3    finasteride (PROSCAR) 5 MG tablet Take 1 tab Daily 90 tablet 3    glucose blood VI test strips (PHIL CONTOUR TEST) strip 1 each by In Vitro route daily 300 each 3    acetaminophen 650 MG TABS Take 650 mg by mouth every 4 hours as needed 120 tablet 3     No current facility-administered medications for this visit. Allergies   Allergen Reactions    Pcn [Penicillins] Hives    Zoloft [Sertraline Hcl] Anxiety     Worsened anxiety       SUBJECTIVE:   Review of Systems   Constitutional: Negative for activity change, appetite change, fatigue and fever. HENT: Negative for congestion.

## 2018-11-28 ENCOUNTER — HOSPITAL ENCOUNTER (OUTPATIENT)
Age: 53
Discharge: HOME | End: 2018-11-28
Payer: COMMERCIAL

## 2018-11-28 DIAGNOSIS — I50.42 CHF (CONGESTIVE HEART FAILURE), NYHA CLASS II, CHRONIC, COMBINED (HCC): ICD-10-CM

## 2018-11-28 LAB
ANION GAP SERPL CALCULATED.3IONS-SCNC: 13 MEQ/L (ref 8–16)
BUN BLDV-MCNC: 23 MG/DL (ref 7–22)
CALCIUM SERPL-MCNC: 8.8 MG/DL (ref 8.5–10.5)
CHLORIDE BLD-SCNC: 102 MEQ/L (ref 98–111)
CO2: 23 MEQ/L (ref 23–33)
CREAT SERPL-MCNC: 1.6 MG/DL (ref 0.4–1.2)
GFR SERPL CREATININE-BSD FRML MDRD: 45 ML/MIN/1.73M2
GLUCOSE BLD-MCNC: 160 MG/DL (ref 70–108)
POTASSIUM SERPL-SCNC: 4.1 MEQ/L (ref 3.5–5.2)
SODIUM BLD-SCNC: 138 MEQ/L (ref 135–145)

## 2018-11-28 PROCEDURE — 36415 COLL VENOUS BLD VENIPUNCTURE: CPT

## 2018-11-28 PROCEDURE — 80048 BASIC METABOLIC PNL TOTAL CA: CPT

## 2018-12-03 DIAGNOSIS — F41.3 OTHER MIXED ANXIETY DISORDERS: ICD-10-CM

## 2018-12-03 RX ORDER — ALPRAZOLAM 0.5 MG/1
TABLET ORAL
Qty: 30 TABLET | Refills: 0 | Status: SHIPPED | OUTPATIENT
Start: 2018-12-03 | End: 2018-12-10 | Stop reason: SDUPTHER

## 2018-12-10 ENCOUNTER — OFFICE VISIT (OUTPATIENT)
Dept: FAMILY MEDICINE CLINIC | Age: 53
End: 2018-12-10
Payer: COMMERCIAL

## 2018-12-10 VITALS
SYSTOLIC BLOOD PRESSURE: 138 MMHG | HEART RATE: 60 BPM | WEIGHT: 268.8 LBS | DIASTOLIC BLOOD PRESSURE: 78 MMHG | BODY MASS INDEX: 34.5 KG/M2 | HEIGHT: 74 IN | OXYGEN SATURATION: 98 % | RESPIRATION RATE: 15 BRPM

## 2018-12-10 DIAGNOSIS — Z95.1 HX OF CABG: ICD-10-CM

## 2018-12-10 DIAGNOSIS — N18.30 CKD (CHRONIC KIDNEY DISEASE), STAGE III (HCC): ICD-10-CM

## 2018-12-10 DIAGNOSIS — E78.2 MIXED HYPERLIPIDEMIA: ICD-10-CM

## 2018-12-10 DIAGNOSIS — J32.9 RHINOSINUSITIS: ICD-10-CM

## 2018-12-10 DIAGNOSIS — F41.3 OTHER MIXED ANXIETY DISORDERS: ICD-10-CM

## 2018-12-10 DIAGNOSIS — Z95.810 ICD (IMPLANTABLE CARDIOVERTER-DEFIBRILLATOR) IN PLACE: ICD-10-CM

## 2018-12-10 DIAGNOSIS — I50.22 CHRONIC SYSTOLIC CONGESTIVE HEART FAILURE (HCC): ICD-10-CM

## 2018-12-10 DIAGNOSIS — E11.22 CONTROLLED TYPE 2 DIABETES MELLITUS WITH CHRONIC KIDNEY DISEASE, WITHOUT LONG-TERM CURRENT USE OF INSULIN, UNSPECIFIED CKD STAGE (HCC): Primary | ICD-10-CM

## 2018-12-10 DIAGNOSIS — Z79.01 ANTICOAGULATED ON COUMADIN: ICD-10-CM

## 2018-12-10 DIAGNOSIS — I25.810 CORONARY ARTERY DISEASE INVOLVING CORONARY BYPASS GRAFT OF NATIVE HEART WITHOUT ANGINA PECTORIS: ICD-10-CM

## 2018-12-10 DIAGNOSIS — J31.0 RHINOSINUSITIS: ICD-10-CM

## 2018-12-10 DIAGNOSIS — I10 ESSENTIAL HYPERTENSION: ICD-10-CM

## 2018-12-10 DIAGNOSIS — I48.0 PAROXYSMAL ATRIAL FIBRILLATION (HCC): ICD-10-CM

## 2018-12-10 LAB — HBA1C MFR BLD: 5.6 %

## 2018-12-10 PROCEDURE — 99214 OFFICE O/P EST MOD 30 MIN: CPT | Performed by: NURSE PRACTITIONER

## 2018-12-10 PROCEDURE — 83036 HEMOGLOBIN GLYCOSYLATED A1C: CPT | Performed by: NURSE PRACTITIONER

## 2018-12-10 RX ORDER — ALPRAZOLAM 0.5 MG/1
TABLET ORAL
Qty: 30 TABLET | Refills: 4 | Status: SHIPPED | OUTPATIENT
Start: 2018-12-10 | End: 2019-06-04 | Stop reason: SDUPTHER

## 2018-12-10 RX ORDER — CEFUROXIME AXETIL 500 MG/1
500 TABLET ORAL 2 TIMES DAILY
Qty: 20 TABLET | Refills: 0 | Status: SHIPPED | OUTPATIENT
Start: 2018-12-10 | End: 2018-12-20

## 2018-12-10 ASSESSMENT — ENCOUNTER SYMPTOMS
BACK PAIN: 1
ABDOMINAL PAIN: 0
COUGH: 0
NAUSEA: 0
SHORTNESS OF BREATH: 0

## 2018-12-10 NOTE — PROGRESS NOTES
Chronic Disease Visit Information    BP Readings from Last 3 Encounters:   11/26/18 (!) 190/94   09/25/18 (!) 165/76   09/06/18 110/70          Hemoglobin A1C (%)   Date Value   05/14/2018 6.8 (H)   01/23/2018 7.1 (H)   12/17/2017 9.5 (H)     Microalbumin, Random Urine (mg/dL)   Date Value   09/19/2018 87.45     LDL Calculated (mg/dL)   Date Value   05/14/2018 49     HDL (mg/dL)   Date Value   05/14/2018 33     BUN (mg/dL)   Date Value   11/28/2018 23 (H)     CREATININE (mg/dL)   Date Value   11/28/2018 1.6 (H)     Glucose (mg/dL)   Date Value   11/28/2018 160 (H)            Have you changed or started any medications since your last visit including any over-the-counter medicines, vitamins, or herbal medicines? no   Are you having any side effects from any of your medications? -  no  Have you stopped taking any of your medications? Is so, why? -  no    Have you seen any other physician or provider since your last visit? Yes - Records Obtained  Have you had any other diagnostic tests since your last visit? Yes - Records Obtained  Have you been seen in the emergency room and/or had an admission to a hospital since we last saw you? No  Have you had your annual diabetic retinal (eye) exam? No  Have you had your routine dental cleaning in the past 6 months? no    Have you activated your [employer] account? If not, what are your barriers?  No: declined      Patient Care Team:  ISABELLA Rivera - CNP as PCP - General (Certified Nurse Practitioner)  Savanah Baptiste MD as Consulting Physician (Orthopedic Surgery)  Wilfrido Flores MD as Consulting Physician (Cardiology)         Medical History Review  Past Medical, Family, and Social History reviewed and does contribute to the patient presenting condition    Health Maintenance   Topic Date Due    Pneumococcal highest risk (1 of 3 - PCV13) 05/20/1984    Colon cancer screen colonoscopy  05/20/2015    Diabetic retinal exam  12/15/2016    Diabetic foot exam  07/07/2018

## 2018-12-13 ENCOUNTER — PROCEDURE VISIT (OUTPATIENT)
Dept: CARDIOLOGY CLINIC | Age: 53
End: 2018-12-13
Payer: COMMERCIAL

## 2018-12-13 ENCOUNTER — TELEPHONE (OUTPATIENT)
Dept: CARDIOLOGY CLINIC | Age: 53
End: 2018-12-13

## 2018-12-13 DIAGNOSIS — Z95.810 S/P ICD (INTERNAL CARDIAC DEFIBRILLATOR) PROCEDURE: Primary | ICD-10-CM

## 2018-12-13 PROCEDURE — 93295 DEV INTERROG REMOTE 1/2/MLT: CPT | Performed by: INTERNAL MEDICINE

## 2018-12-13 PROCEDURE — 93296 REM INTERROG EVL PM/IDS: CPT | Performed by: INTERNAL MEDICINE

## 2018-12-13 RX ORDER — ISOSORBIDE MONONITRATE 60 MG/1
60 TABLET, EXTENDED RELEASE ORAL DAILY
Qty: 90 TABLET | Refills: 2 | Status: SHIPPED | OUTPATIENT
Start: 2018-12-13 | End: 2019-03-24 | Stop reason: SDUPTHER

## 2018-12-13 NOTE — PROGRESS NOTES
Medtronic dual icd  Battery 7.1 years  A paced 0.6%  V Paced 0.1%  p wave 1.6mV  r wave 10.6mV  Atrial impedance 456 ohms  Ventricle impedance 342 ohms   RV impedance 60 ohms  optivol wnl

## 2018-12-14 ENCOUNTER — HOSPITAL ENCOUNTER (EMERGENCY)
Age: 53
Discharge: HOME | End: 2018-12-14
Attending: FAMILY MEDICINE
Payer: COMMERCIAL

## 2018-12-14 ENCOUNTER — APPOINTMENT (OUTPATIENT)
Dept: CT IMAGING | Age: 53
End: 2018-12-14
Payer: COMMERCIAL

## 2018-12-14 ENCOUNTER — APPOINTMENT (OUTPATIENT)
Dept: GENERAL RADIOLOGY | Age: 53
End: 2018-12-14
Payer: COMMERCIAL

## 2018-12-14 VITALS
DIASTOLIC BLOOD PRESSURE: 83 MMHG | RESPIRATION RATE: 14 BRPM | HEART RATE: 52 BPM | OXYGEN SATURATION: 96 % | HEIGHT: 74 IN | WEIGHT: 265 LBS | TEMPERATURE: 98.2 F | SYSTOLIC BLOOD PRESSURE: 179 MMHG | BODY MASS INDEX: 34.01 KG/M2

## 2018-12-14 DIAGNOSIS — M54.10 RADICULOPATHY, UNSPECIFIED SPINAL REGION: Primary | ICD-10-CM

## 2018-12-14 LAB
ALBUMIN SERPL-MCNC: 3.5 G/DL (ref 3.5–5.1)
ALP BLD-CCNC: 50 U/L (ref 38–126)
ALT SERPL-CCNC: 24 U/L (ref 11–66)
ANION GAP SERPL CALCULATED.3IONS-SCNC: 11 MEQ/L (ref 8–16)
APTT: 37.3 SECONDS (ref 22–38)
AST SERPL-CCNC: 25 U/L (ref 5–40)
BASOPHILS # BLD: 0.8 %
BASOPHILS ABSOLUTE: 0.1 THOU/MM3 (ref 0–0.1)
BILIRUB SERPL-MCNC: 0.3 MG/DL (ref 0.3–1.2)
BUN BLDV-MCNC: 19 MG/DL (ref 7–22)
C-REACTIVE PROTEIN: 0.13 MG/DL (ref 0–1)
CALCIUM SERPL-MCNC: 8.7 MG/DL (ref 8.5–10.5)
CHLORIDE BLD-SCNC: 105 MEQ/L (ref 98–111)
CO2: 21 MEQ/L (ref 23–33)
CREAT SERPL-MCNC: 1.4 MG/DL (ref 0.4–1.2)
EKG ATRIAL RATE: 59 BPM
EKG P AXIS: 24 DEGREES
EKG P-R INTERVAL: 184 MS
EKG Q-T INTERVAL: 442 MS
EKG QRS DURATION: 92 MS
EKG QTC CALCULATION (BAZETT): 437 MS
EKG R AXIS: 53 DEGREES
EKG T AXIS: -57 DEGREES
EKG VENTRICULAR RATE: 59 BPM
EOSINOPHIL # BLD: 1.8 %
EOSINOPHILS ABSOLUTE: 0.1 THOU/MM3 (ref 0–0.4)
ERYTHROCYTE [DISTWIDTH] IN BLOOD BY AUTOMATED COUNT: 14.1 % (ref 11.5–14.5)
ERYTHROCYTE [DISTWIDTH] IN BLOOD BY AUTOMATED COUNT: 54.8 FL (ref 35–45)
GFR SERPL CREATININE-BSD FRML MDRD: 53 ML/MIN/1.73M2
GLUCOSE BLD-MCNC: 196 MG/DL (ref 70–108)
HCT VFR BLD CALC: 40.1 % (ref 42–52)
HEMOGLOBIN: 13.5 GM/DL (ref 14–18)
IMMATURE GRANS (ABS): 0.02 THOU/MM3 (ref 0–0.07)
IMMATURE GRANULOCYTES: 0.3 %
INR BLD: 1.31 (ref 0.85–1.13)
LIPASE: 64 U/L (ref 5.6–51.3)
LYMPHOCYTES # BLD: 11.3 %
LYMPHOCYTES ABSOLUTE: 0.8 THOU/MM3 (ref 1–4.8)
MAGNESIUM: 1.8 MG/DL (ref 1.6–2.4)
MCH RBC QN AUTO: 35.3 PG (ref 26–33)
MCHC RBC AUTO-ENTMCNC: 33.7 GM/DL (ref 32.2–35.5)
MCV RBC AUTO: 105 FL (ref 80–94)
MONOCYTES # BLD: 9 %
MONOCYTES ABSOLUTE: 0.6 THOU/MM3 (ref 0.4–1.3)
NUCLEATED RED BLOOD CELLS: 0 /100 WBC
OSMOLALITY CALCULATION: 281.5 MOSMOL/KG (ref 275–300)
PLATELET # BLD: 157 THOU/MM3 (ref 130–400)
PMV BLD AUTO: 10.1 FL (ref 9.4–12.4)
POTASSIUM SERPL-SCNC: 4.3 MEQ/L (ref 3.5–5.2)
RBC # BLD: 3.82 MILL/MM3 (ref 4.7–6.1)
SEDIMENTATION RATE, ERYTHROCYTE: 16 MM/HR (ref 0–10)
SEG NEUTROPHILS: 76.8 %
SEGMENTED NEUTROPHILS ABSOLUTE COUNT: 5.5 THOU/MM3 (ref 1.8–7.7)
SODIUM BLD-SCNC: 137 MEQ/L (ref 135–145)
TOTAL PROTEIN: 5.9 G/DL (ref 6.1–8)
TROPONIN T: 0.03 NG/ML
TROPONIN T: 0.03 NG/ML
TSH SERPL DL<=0.05 MIU/L-ACNC: 1.94 UIU/ML (ref 0.4–4.2)
WBC # BLD: 7.1 THOU/MM3 (ref 4.8–10.8)

## 2018-12-14 PROCEDURE — 6360000002 HC RX W HCPCS: Performed by: FAMILY MEDICINE

## 2018-12-14 PROCEDURE — 85730 THROMBOPLASTIN TIME PARTIAL: CPT

## 2018-12-14 PROCEDURE — 6360000004 HC RX CONTRAST MEDICATION: Performed by: FAMILY MEDICINE

## 2018-12-14 PROCEDURE — 84443 ASSAY THYROID STIM HORMONE: CPT

## 2018-12-14 PROCEDURE — 96374 THER/PROPH/DIAG INJ IV PUSH: CPT

## 2018-12-14 PROCEDURE — 85651 RBC SED RATE NONAUTOMATED: CPT

## 2018-12-14 PROCEDURE — 72125 CT NECK SPINE W/O DYE: CPT

## 2018-12-14 PROCEDURE — 85610 PROTHROMBIN TIME: CPT

## 2018-12-14 PROCEDURE — 93010 ELECTROCARDIOGRAM REPORT: CPT | Performed by: INTERNAL MEDICINE

## 2018-12-14 PROCEDURE — 85025 COMPLETE CBC W/AUTO DIFF WBC: CPT

## 2018-12-14 PROCEDURE — 86140 C-REACTIVE PROTEIN: CPT

## 2018-12-14 PROCEDURE — 93005 ELECTROCARDIOGRAM TRACING: CPT | Performed by: FAMILY MEDICINE

## 2018-12-14 PROCEDURE — 99284 EMERGENCY DEPT VISIT MOD MDM: CPT

## 2018-12-14 PROCEDURE — 71046 X-RAY EXAM CHEST 2 VIEWS: CPT

## 2018-12-14 PROCEDURE — 83735 ASSAY OF MAGNESIUM: CPT

## 2018-12-14 PROCEDURE — 71275 CT ANGIOGRAPHY CHEST: CPT

## 2018-12-14 PROCEDURE — 70450 CT HEAD/BRAIN W/O DYE: CPT

## 2018-12-14 PROCEDURE — 96375 TX/PRO/DX INJ NEW DRUG ADDON: CPT

## 2018-12-14 PROCEDURE — 84484 ASSAY OF TROPONIN QUANT: CPT

## 2018-12-14 PROCEDURE — 80053 COMPREHEN METABOLIC PANEL: CPT

## 2018-12-14 PROCEDURE — 83690 ASSAY OF LIPASE: CPT

## 2018-12-14 PROCEDURE — 36415 COLL VENOUS BLD VENIPUNCTURE: CPT

## 2018-12-14 RX ORDER — KETOROLAC TROMETHAMINE 30 MG/ML
30 INJECTION, SOLUTION INTRAMUSCULAR; INTRAVENOUS ONCE
Status: COMPLETED | OUTPATIENT
Start: 2018-12-14 | End: 2018-12-14

## 2018-12-14 RX ORDER — HYDRALAZINE HYDROCHLORIDE 20 MG/ML
10 INJECTION INTRAMUSCULAR; INTRAVENOUS ONCE
Status: COMPLETED | OUTPATIENT
Start: 2018-12-14 | End: 2018-12-14

## 2018-12-14 RX ADMIN — HYDRALAZINE HYDROCHLORIDE 10 MG: 20 INJECTION INTRAMUSCULAR; INTRAVENOUS at 14:49

## 2018-12-14 RX ADMIN — KETOROLAC TROMETHAMINE 30 MG: 30 INJECTION, SOLUTION INTRAMUSCULAR at 15:45

## 2018-12-14 RX ADMIN — IOPAMIDOL 80 ML: 755 INJECTION, SOLUTION INTRAVENOUS at 15:15

## 2018-12-14 ASSESSMENT — PAIN SCALES - GENERAL: PAINLEVEL_OUTOF10: 7

## 2018-12-14 ASSESSMENT — ENCOUNTER SYMPTOMS
NAUSEA: 0
SORE THROAT: 0
ABDOMINAL PAIN: 0
SINUS PRESSURE: 0
ABDOMINAL DISTENTION: 0
TROUBLE SWALLOWING: 0
SHORTNESS OF BREATH: 0
CHEST TIGHTNESS: 0
VOMITING: 0
WHEEZING: 0
COUGH: 0
DIARRHEA: 0
CONSTIPATION: 0

## 2018-12-14 ASSESSMENT — PAIN DESCRIPTION - DESCRIPTORS: DESCRIPTORS: NUMBNESS;DISCOMFORT

## 2018-12-14 ASSESSMENT — PAIN DESCRIPTION - PAIN TYPE: TYPE: ACUTE PAIN

## 2018-12-14 ASSESSMENT — PAIN DESCRIPTION - LOCATION: LOCATION: ARM;NECK;SHOULDER

## 2018-12-14 ASSESSMENT — PAIN DESCRIPTION - FREQUENCY: FREQUENCY: CONTINUOUS

## 2018-12-14 ASSESSMENT — PAIN DESCRIPTION - ORIENTATION: ORIENTATION: LEFT

## 2018-12-14 NOTE — ED PROVIDER NOTES
[facility]  eMERGENCY dEPARTMENT eNCOUnter   Physician Attestation    Pt Name: Agustin Seymour  MRN: [id number]  [place] 1965  Date of evaluation: 12/14/18        Physician Note:    Evaluation:  I have personally performed a face-to-face evaluation on this patient. I have reviewed Martin's findings and agree. History and exam by me shows the following information. Patient comes in for left neck trapezius pain been going on for the last few weeks    Pain worse with movement of the neck    Does not really have chest pain    He did show sinus rhythm with some T-wave inversion in inferior leads    Does have elevated troponin 0.034 though he's been chronically elevated     Creatinine 1.4    CTA chest obtained was negative for PE    CT cervical spine show degenerative changes    Patient's offered admission but he declines    He prefers to go home he has no chest pain    Repeat troponin 0.032    Given toradol for neck/musculoskeletal pain    Patient will discharge home            1. Radiculopathy, unspecified spinal region          DISPOSITION/PLAN  PATIENT REFERRED TO:  No follow-up provider specified.   DISCHARGE MEDICATIONS:  New Prescriptions    No medications on file         MD Everette Parker MD  12/14/18 1567
Abnormal; Notable for the following:     Glucose 196 (*)     CREATININE 1.4 (*)     CO2 21 (*)     Total Protein 5.9 (*)     All other components within normal limits   PROTIME-INR - Abnormal; Notable for the following:     INR 1.31 (*)     All other components within normal limits   SEDIMENTATION RATE - Abnormal; Notable for the following:     Sed Rate 16 (*)     All other components within normal limits   LIPASE - Abnormal; Notable for the following:     Lipase 64.0 (*)     All other components within normal limits   TROPONIN - Abnormal; Notable for the following:     Troponin T 0.034 (*)     All other components within normal limits   GLOMERULAR FILTRATION RATE, ESTIMATED - Abnormal; Notable for the following:     Est, Glom Filt Rate 53 (*)     All other components within normal limits   TROPONIN - Abnormal; Notable for the following:     Troponin T 0.032 (*)     All other components within normal limits   APTT   C-REACTIVE PROTEIN   MAGNESIUM   TSH WITHOUT REFLEX   ANION GAP   OSMOLALITY   URINE DRUG SCREEN   URINE RT REFLEX TO CULTURE       EMERGENCY DEPARTMENT COURSE:   Vitals:    Vitals:    12/14/18 1300 12/14/18 1349 12/14/18 1441 12/14/18 1545   BP: (!) 169/80 (!) 171/84 (!) 200/100 (!) 179/83   Pulse:  54  52   Resp:    14   Temp:       TempSrc:       SpO2: 98%   96%   Weight:       Height:           12:30 PM: The patient was seen and evaluated in a timely fashion. ED Course as of Dec 14 1636   Fri Dec 14, 2018   1431 Discussed EKG changes concerning for possible inferior etiology. There were T-wave inversions noted II, III and aVF. These are new when compared to old EKGs. Also noted was an elevated troponin. At this time patient refused to stay. Did compromise and requested a second troponin. If second troponin is elevated, patient will consider staying for further evaluation treatment. If it remains the same or improves, we will consider discharge.   [MG]      ED Course User Index  [MG] Sarah Duckworth

## 2018-12-19 ENCOUNTER — TELEPHONE (OUTPATIENT)
Dept: FAMILY MEDICINE CLINIC | Age: 53
End: 2018-12-19

## 2018-12-19 DIAGNOSIS — M54.12 CERVICAL RADICULOPATHY: Primary | ICD-10-CM

## 2018-12-20 ENCOUNTER — TELEPHONE (OUTPATIENT)
Dept: FAMILY MEDICINE CLINIC | Age: 53
End: 2018-12-20

## 2018-12-20 DIAGNOSIS — I10 ESSENTIAL HYPERTENSION: ICD-10-CM

## 2018-12-20 DIAGNOSIS — M54.12 CERVICAL RADICULOPATHY: Primary | ICD-10-CM

## 2018-12-20 RX ORDER — HYDRALAZINE HYDROCHLORIDE 50 MG/1
TABLET, FILM COATED ORAL
Qty: 45 TABLET | Refills: 0 | Status: SHIPPED | OUTPATIENT
Start: 2018-12-20 | End: 2019-02-11 | Stop reason: DRUGHIGH

## 2018-12-20 RX ORDER — GABAPENTIN 100 MG/1
100 CAPSULE ORAL 3 TIMES DAILY
Qty: 90 CAPSULE | Refills: 0 | Status: SHIPPED | OUTPATIENT
Start: 2018-12-20 | End: 2019-01-07 | Stop reason: ALTCHOICE

## 2018-12-20 NOTE — TELEPHONE ENCOUNTER
Patient is calling because of the pain at the base of his neck across left shoulder and into left arm. Its been like this for over a week. He went to the ed and was diagnosed with pinched nerve and arthritis. He cannot get into see dr Sim Watt until jan 22. He is asking if pawel could please rx a pain med.   [facility] [address]  dolv 12/10  kindra

## 2019-01-07 ENCOUNTER — TELEPHONE (OUTPATIENT)
Dept: ADMINISTRATIVE | Age: 54
End: 2019-01-07

## 2019-01-07 DIAGNOSIS — M54.12 CERVICAL RADICULOPATHY: ICD-10-CM

## 2019-01-07 RX ORDER — GABAPENTIN 300 MG/1
300 CAPSULE ORAL 3 TIMES DAILY
Qty: 90 CAPSULE | Refills: 0 | Status: SHIPPED | OUTPATIENT
Start: 2019-01-07 | End: 2019-06-04 | Stop reason: ALTCHOICE

## 2019-01-17 ENCOUNTER — TELEPHONE (OUTPATIENT)
Dept: CARDIOLOGY CLINIC | Age: 54
End: 2019-01-17

## 2019-01-18 ENCOUNTER — PROCEDURE VISIT (OUTPATIENT)
Dept: CARDIOLOGY CLINIC | Age: 54
End: 2019-01-18
Payer: COMMERCIAL

## 2019-01-18 DIAGNOSIS — I50.22 CHRONIC SYSTOLIC CONGESTIVE HEART FAILURE (HCC): Primary | ICD-10-CM

## 2019-01-18 DIAGNOSIS — Z95.810 ICD (IMPLANTABLE CARDIOVERTER-DEFIBRILLATOR) IN PLACE: ICD-10-CM

## 2019-01-18 PROCEDURE — 93299 PR REM INTERROG ICPMS/SCRMS <30 D TECH REVIEW: CPT | Performed by: NUCLEAR MEDICINE

## 2019-01-18 PROCEDURE — 93297 REM INTERROG DEV EVAL ICPMS: CPT | Performed by: NUCLEAR MEDICINE

## 2019-02-03 ENCOUNTER — APPOINTMENT (OUTPATIENT)
Dept: GENERAL RADIOLOGY | Age: 54
DRG: 291 | End: 2019-02-03
Payer: COMMERCIAL

## 2019-02-03 ENCOUNTER — APPOINTMENT (OUTPATIENT)
Dept: CT IMAGING | Age: 54
DRG: 291 | End: 2019-02-03
Payer: COMMERCIAL

## 2019-02-03 ENCOUNTER — HOSPITAL ENCOUNTER (INPATIENT)
Age: 54
LOS: 1 days | Discharge: HOME | DRG: 291 | End: 2019-02-04
Attending: EMERGENCY MEDICINE | Admitting: INTERNAL MEDICINE
Payer: COMMERCIAL

## 2019-02-03 DIAGNOSIS — I50.43 ACUTE ON CHRONIC COMBINED SYSTOLIC AND DIASTOLIC CONGESTIVE HEART FAILURE (HCC): ICD-10-CM

## 2019-02-03 DIAGNOSIS — J81.1 CHRONIC PULMONARY EDEMA: Primary | ICD-10-CM

## 2019-02-03 DIAGNOSIS — R04.2 COUGH WITH HEMOPTYSIS: ICD-10-CM

## 2019-02-03 DIAGNOSIS — Z79.01 WARFARIN ANTICOAGULATION: ICD-10-CM

## 2019-02-03 LAB
ALLEN TEST: POSITIVE
ANION GAP SERPL CALCULATED.3IONS-SCNC: 12 MEQ/L (ref 8–16)
BASE EXCESS (CALCULATED): -2.4 MMOL/L (ref -2.5–2.5)
BASOPHILS # BLD: 0.7 %
BASOPHILS ABSOLUTE: 0.1 THOU/MM3 (ref 0–0.1)
BUN BLDV-MCNC: 20 MG/DL (ref 7–22)
CALCIUM SERPL-MCNC: 8.7 MG/DL (ref 8.5–10.5)
CHLORIDE BLD-SCNC: 106 MEQ/L (ref 98–111)
CO2: 21 MEQ/L (ref 23–33)
COLLECTED BY:: ABNORMAL
CREAT SERPL-MCNC: 1.2 MG/DL (ref 0.4–1.2)
DEVICE: ABNORMAL
EKG ATRIAL RATE: 70 BPM
EKG P AXIS: 47 DEGREES
EKG P-R INTERVAL: 196 MS
EKG Q-T INTERVAL: 402 MS
EKG QRS DURATION: 92 MS
EKG QTC CALCULATION (BAZETT): 434 MS
EKG T AXIS: 124 DEGREES
EKG VENTRICULAR RATE: 70 BPM
EOSINOPHIL # BLD: 3.5 %
EOSINOPHILS ABSOLUTE: 0.3 THOU/MM3 (ref 0–0.4)
ERYTHROCYTE [DISTWIDTH] IN BLOOD BY AUTOMATED COUNT: 13.5 % (ref 11.5–14.5)
ERYTHROCYTE [DISTWIDTH] IN BLOOD BY AUTOMATED COUNT: 51.1 FL (ref 35–45)
GFR SERPL CREATININE-BSD FRML MDRD: 63 ML/MIN/1.73M2
GLUCOSE BLD-MCNC: 103 MG/DL (ref 70–108)
GLUCOSE BLD-MCNC: 121 MG/DL (ref 70–108)
GLUCOSE BLD-MCNC: 143 MG/DL (ref 70–108)
GLUCOSE BLD-MCNC: 212 MG/DL (ref 70–108)
HCO3: 21 MMOL/L (ref 23–28)
HCT VFR BLD CALC: 33.4 % (ref 42–52)
HEMOGLOBIN: 11.1 GM/DL (ref 14–18)
IMMATURE GRANS (ABS): 0.03 THOU/MM3 (ref 0–0.07)
IMMATURE GRANULOCYTES: 0.4 %
INR BLD: 1.49 (ref 0.85–1.13)
LYMPHOCYTES # BLD: 9.6 %
LYMPHOCYTES ABSOLUTE: 0.7 THOU/MM3 (ref 1–4.8)
MCH RBC QN AUTO: 34 PG (ref 26–33)
MCHC RBC AUTO-ENTMCNC: 33.2 GM/DL (ref 32.2–35.5)
MCV RBC AUTO: 102.5 FL (ref 80–94)
MONOCYTES # BLD: 8.9 %
MONOCYTES ABSOLUTE: 0.7 THOU/MM3 (ref 0.4–1.3)
NUCLEATED RED BLOOD CELLS: 0 /100 WBC
O2 SATURATION: 97 %
OSMOLALITY CALCULATION: 280.4 MOSMOL/KG (ref 275–300)
PCO2: 32 MMHG (ref 35–45)
PH BLOOD GAS: 7.43 (ref 7.35–7.45)
PLATELET # BLD: 158 THOU/MM3 (ref 130–400)
PMV BLD AUTO: 11.1 FL (ref 9.4–12.4)
PO2: 88 MMHG (ref 71–104)
POTASSIUM REFLEX MAGNESIUM: 4.1 MEQ/L (ref 3.5–5.2)
PRO-BNP: 8256 PG/ML (ref 0–900)
PROCALCITONIN: 0.12 NG/ML (ref 0.01–0.09)
RBC # BLD: 3.26 MILL/MM3 (ref 4.7–6.1)
SEG NEUTROPHILS: 76.9 %
SEGMENTED NEUTROPHILS ABSOLUTE COUNT: 5.8 THOU/MM3 (ref 1.8–7.7)
SODIUM BLD-SCNC: 139 MEQ/L (ref 135–145)
SOURCE, BLOOD GAS: ABNORMAL
TROPONIN T: 0.04 NG/ML
TSH SERPL DL<=0.05 MIU/L-ACNC: 2.91 UIU/ML (ref 0.4–4.2)
WBC # BLD: 7.6 THOU/MM3 (ref 4.8–10.8)

## 2019-02-03 PROCEDURE — 99285 EMERGENCY DEPT VISIT HI MDM: CPT

## 2019-02-03 PROCEDURE — 87040 BLOOD CULTURE FOR BACTERIA: CPT

## 2019-02-03 PROCEDURE — 99254 IP/OBS CNSLTJ NEW/EST MOD 60: CPT | Performed by: NUCLEAR MEDICINE

## 2019-02-03 PROCEDURE — 93010 ELECTROCARDIOGRAM REPORT: CPT | Performed by: INTERNAL MEDICINE

## 2019-02-03 PROCEDURE — 36600 WITHDRAWAL OF ARTERIAL BLOOD: CPT

## 2019-02-03 PROCEDURE — 2500000003 HC RX 250 WO HCPCS: Performed by: INTERNAL MEDICINE

## 2019-02-03 PROCEDURE — 96374 THER/PROPH/DIAG INJ IV PUSH: CPT

## 2019-02-03 PROCEDURE — 1200000003 HC TELEMETRY R&B

## 2019-02-03 PROCEDURE — 84443 ASSAY THYROID STIM HORMONE: CPT

## 2019-02-03 PROCEDURE — 71260 CT THORAX DX C+: CPT

## 2019-02-03 PROCEDURE — 2500000003 HC RX 250 WO HCPCS: Performed by: EMERGENCY MEDICINE

## 2019-02-03 PROCEDURE — 85610 PROTHROMBIN TIME: CPT

## 2019-02-03 PROCEDURE — 2580000003 HC RX 258: Performed by: EMERGENCY MEDICINE

## 2019-02-03 PROCEDURE — 6360000004 HC RX CONTRAST MEDICATION: Performed by: EMERGENCY MEDICINE

## 2019-02-03 PROCEDURE — 82803 BLOOD GASES ANY COMBINATION: CPT

## 2019-02-03 PROCEDURE — 6360000002 HC RX W HCPCS: Performed by: EMERGENCY MEDICINE

## 2019-02-03 PROCEDURE — 36415 COLL VENOUS BLD VENIPUNCTURE: CPT

## 2019-02-03 PROCEDURE — 71045 X-RAY EXAM CHEST 1 VIEW: CPT

## 2019-02-03 PROCEDURE — 99223 1ST HOSP IP/OBS HIGH 75: CPT | Performed by: INTERNAL MEDICINE

## 2019-02-03 PROCEDURE — 84484 ASSAY OF TROPONIN QUANT: CPT

## 2019-02-03 PROCEDURE — 2580000003 HC RX 258: Performed by: INTERNAL MEDICINE

## 2019-02-03 PROCEDURE — 84145 PROCALCITONIN (PCT): CPT

## 2019-02-03 PROCEDURE — 93005 ELECTROCARDIOGRAM TRACING: CPT | Performed by: EMERGENCY MEDICINE

## 2019-02-03 PROCEDURE — 85025 COMPLETE CBC W/AUTO DIFF WBC: CPT

## 2019-02-03 PROCEDURE — 6370000000 HC RX 637 (ALT 250 FOR IP): Performed by: INTERNAL MEDICINE

## 2019-02-03 PROCEDURE — 82948 REAGENT STRIP/BLOOD GLUCOSE: CPT

## 2019-02-03 PROCEDURE — 80048 BASIC METABOLIC PNL TOTAL CA: CPT

## 2019-02-03 PROCEDURE — 83880 ASSAY OF NATRIURETIC PEPTIDE: CPT

## 2019-02-03 RX ORDER — AMIODARONE HYDROCHLORIDE 200 MG/1
200 TABLET ORAL DAILY
Status: DISCONTINUED | OUTPATIENT
Start: 2019-02-03 | End: 2019-02-04 | Stop reason: HOSPADM

## 2019-02-03 RX ORDER — METOPROLOL TARTRATE 50 MG/1
50 TABLET, FILM COATED ORAL EVERY 8 HOURS SCHEDULED
Status: DISCONTINUED | OUTPATIENT
Start: 2019-02-03 | End: 2019-02-04 | Stop reason: HOSPADM

## 2019-02-03 RX ORDER — MEXILETINE HYDROCHLORIDE 150 MG/1
300 CAPSULE ORAL EVERY 8 HOURS SCHEDULED
Status: DISCONTINUED | OUTPATIENT
Start: 2019-02-03 | End: 2019-02-04 | Stop reason: HOSPADM

## 2019-02-03 RX ORDER — ISOSORBIDE MONONITRATE 60 MG/1
60 TABLET, EXTENDED RELEASE ORAL DAILY
Status: DISCONTINUED | OUTPATIENT
Start: 2019-02-03 | End: 2019-02-04 | Stop reason: HOSPADM

## 2019-02-03 RX ORDER — HYDRALAZINE HYDROCHLORIDE 50 MG/1
50 TABLET, FILM COATED ORAL EVERY 8 HOURS SCHEDULED
Status: DISCONTINUED | OUTPATIENT
Start: 2019-02-03 | End: 2019-02-03

## 2019-02-03 RX ORDER — ALPRAZOLAM 0.5 MG/1
0.5 TABLET ORAL NIGHTLY PRN
Status: DISCONTINUED | OUTPATIENT
Start: 2019-02-03 | End: 2019-02-04 | Stop reason: HOSPADM

## 2019-02-03 RX ORDER — METOPROLOL TARTRATE 50 MG/1
50 TABLET, FILM COATED ORAL ONCE
Status: COMPLETED | OUTPATIENT
Start: 2019-02-03 | End: 2019-02-03

## 2019-02-03 RX ORDER — BUMETANIDE 0.25 MG/ML
1 INJECTION, SOLUTION INTRAMUSCULAR; INTRAVENOUS
Status: DISCONTINUED | OUTPATIENT
Start: 2019-02-03 | End: 2019-02-04 | Stop reason: HOSPADM

## 2019-02-03 RX ORDER — LEVOFLOXACIN 5 MG/ML
750 INJECTION, SOLUTION INTRAVENOUS ONCE
Status: COMPLETED | OUTPATIENT
Start: 2019-02-03 | End: 2019-02-03

## 2019-02-03 RX ORDER — HYDRALAZINE HYDROCHLORIDE 50 MG/1
50 TABLET, FILM COATED ORAL ONCE
Status: COMPLETED | OUTPATIENT
Start: 2019-02-03 | End: 2019-02-03

## 2019-02-03 RX ORDER — ASPIRIN 81 MG/1
81 TABLET, CHEWABLE ORAL DAILY
Status: DISCONTINUED | OUTPATIENT
Start: 2019-02-03 | End: 2019-02-03

## 2019-02-03 RX ORDER — HYDRALAZINE HYDROCHLORIDE 50 MG/1
50 TABLET, FILM COATED ORAL EVERY 8 HOURS SCHEDULED
Status: DISCONTINUED | OUTPATIENT
Start: 2019-02-03 | End: 2019-02-04 | Stop reason: HOSPADM

## 2019-02-03 RX ORDER — DEXTROSE MONOHYDRATE 25 G/50ML
12.5 INJECTION, SOLUTION INTRAVENOUS PRN
Status: DISCONTINUED | OUTPATIENT
Start: 2019-02-03 | End: 2019-02-04 | Stop reason: HOSPADM

## 2019-02-03 RX ORDER — HYDRALAZINE HYDROCHLORIDE 20 MG/ML
5 INJECTION INTRAMUSCULAR; INTRAVENOUS EVERY 6 HOURS PRN
Status: DISCONTINUED | OUTPATIENT
Start: 2019-02-03 | End: 2019-02-04 | Stop reason: HOSPADM

## 2019-02-03 RX ORDER — DEXTROSE MONOHYDRATE 50 MG/ML
100 INJECTION, SOLUTION INTRAVENOUS PRN
Status: DISCONTINUED | OUTPATIENT
Start: 2019-02-03 | End: 2019-02-04 | Stop reason: HOSPADM

## 2019-02-03 RX ORDER — POTASSIUM CHLORIDE 20 MEQ/1
40 TABLET, EXTENDED RELEASE ORAL PRN
Status: DISCONTINUED | OUTPATIENT
Start: 2019-02-03 | End: 2019-02-04 | Stop reason: HOSPADM

## 2019-02-03 RX ORDER — ONDANSETRON 2 MG/ML
4 INJECTION INTRAMUSCULAR; INTRAVENOUS EVERY 6 HOURS PRN
Status: DISCONTINUED | OUTPATIENT
Start: 2019-02-03 | End: 2019-02-04 | Stop reason: HOSPADM

## 2019-02-03 RX ORDER — BUMETANIDE 0.25 MG/ML
1 INJECTION, SOLUTION INTRAMUSCULAR; INTRAVENOUS ONCE
Status: COMPLETED | OUTPATIENT
Start: 2019-02-03 | End: 2019-02-03

## 2019-02-03 RX ORDER — NICOTINE POLACRILEX 4 MG
15 LOZENGE BUCCAL PRN
Status: DISCONTINUED | OUTPATIENT
Start: 2019-02-03 | End: 2019-02-04 | Stop reason: HOSPADM

## 2019-02-03 RX ORDER — POTASSIUM CHLORIDE 7.45 MG/ML
10 INJECTION INTRAVENOUS PRN
Status: DISCONTINUED | OUTPATIENT
Start: 2019-02-03 | End: 2019-02-04 | Stop reason: HOSPADM

## 2019-02-03 RX ORDER — SODIUM CHLORIDE 9 MG/ML
INJECTION, SOLUTION INTRAVENOUS CONTINUOUS
Status: DISCONTINUED | OUTPATIENT
Start: 2019-02-03 | End: 2019-02-03

## 2019-02-03 RX ORDER — NITROGLYCERIN 0.4 MG/1
0.4 TABLET SUBLINGUAL EVERY 5 MIN PRN
Status: DISCONTINUED | OUTPATIENT
Start: 2019-02-03 | End: 2019-02-03 | Stop reason: SDUPTHER

## 2019-02-03 RX ORDER — ATORVASTATIN CALCIUM 40 MG/1
40 TABLET, FILM COATED ORAL DAILY
Status: DISCONTINUED | OUTPATIENT
Start: 2019-02-03 | End: 2019-02-04 | Stop reason: HOSPADM

## 2019-02-03 RX ORDER — NICOTINE 21 MG/24HR
1 PATCH, TRANSDERMAL 24 HOURS TRANSDERMAL EVERY 24 HOURS
Status: DISCONTINUED | OUTPATIENT
Start: 2019-02-03 | End: 2019-02-04 | Stop reason: HOSPADM

## 2019-02-03 RX ORDER — TAMSULOSIN HYDROCHLORIDE 0.4 MG/1
0.4 CAPSULE ORAL DAILY
Status: DISCONTINUED | OUTPATIENT
Start: 2019-02-03 | End: 2019-02-04 | Stop reason: HOSPADM

## 2019-02-03 RX ORDER — SODIUM CHLORIDE 0.9 % (FLUSH) 0.9 %
10 SYRINGE (ML) INJECTION EVERY 12 HOURS SCHEDULED
Status: DISCONTINUED | OUTPATIENT
Start: 2019-02-03 | End: 2019-02-04 | Stop reason: HOSPADM

## 2019-02-03 RX ORDER — POTASSIUM CHLORIDE 20MEQ/15ML
40 LIQUID (ML) ORAL PRN
Status: DISCONTINUED | OUTPATIENT
Start: 2019-02-03 | End: 2019-02-04 | Stop reason: HOSPADM

## 2019-02-03 RX ORDER — NITROGLYCERIN 0.4 MG/1
0.4 TABLET SUBLINGUAL EVERY 5 MIN PRN
Status: DISCONTINUED | OUTPATIENT
Start: 2019-02-03 | End: 2019-02-04 | Stop reason: HOSPADM

## 2019-02-03 RX ORDER — SODIUM CHLORIDE 0.9 % (FLUSH) 0.9 %
10 SYRINGE (ML) INJECTION PRN
Status: DISCONTINUED | OUTPATIENT
Start: 2019-02-03 | End: 2019-02-04 | Stop reason: HOSPADM

## 2019-02-03 RX ORDER — LANOLIN ALCOHOL/MO/W.PET/CERES
6 CREAM (GRAM) TOPICAL NIGHTLY PRN
Status: DISCONTINUED | OUTPATIENT
Start: 2019-02-03 | End: 2019-02-04 | Stop reason: HOSPADM

## 2019-02-03 RX ORDER — GABAPENTIN 300 MG/1
300 CAPSULE ORAL 3 TIMES DAILY
Status: DISCONTINUED | OUTPATIENT
Start: 2019-02-03 | End: 2019-02-03

## 2019-02-03 RX ORDER — ACETAMINOPHEN 325 MG/1
650 TABLET ORAL EVERY 4 HOURS PRN
Status: DISCONTINUED | OUTPATIENT
Start: 2019-02-03 | End: 2019-02-04 | Stop reason: HOSPADM

## 2019-02-03 RX ORDER — DOCUSATE SODIUM 100 MG/1
100 CAPSULE, LIQUID FILLED ORAL 2 TIMES DAILY
Status: DISCONTINUED | OUTPATIENT
Start: 2019-02-03 | End: 2019-02-04 | Stop reason: HOSPADM

## 2019-02-03 RX ADMIN — METOPROLOL TARTRATE 50 MG: 50 TABLET, FILM COATED ORAL at 13:28

## 2019-02-03 RX ADMIN — LEVOFLOXACIN 750 MG: 5 INJECTION, SOLUTION INTRAVENOUS at 06:38

## 2019-02-03 RX ADMIN — MEXILETINE HYDROCHLORIDE 300 MG: 150 CAPSULE ORAL at 13:29

## 2019-02-03 RX ADMIN — IOPAMIDOL 80 ML: 755 INJECTION, SOLUTION INTRAVENOUS at 05:45

## 2019-02-03 RX ADMIN — TICAGRELOR 90 MG: 90 TABLET ORAL at 20:29

## 2019-02-03 RX ADMIN — TICAGRELOR 90 MG: 90 TABLET ORAL at 11:05

## 2019-02-03 RX ADMIN — METOPROLOL TARTRATE 50 MG: 50 TABLET, FILM COATED ORAL at 10:59

## 2019-02-03 RX ADMIN — DOCUSATE SODIUM 100 MG: 100 CAPSULE, LIQUID FILLED ORAL at 11:05

## 2019-02-03 RX ADMIN — Medication 4 UNITS: at 13:30

## 2019-02-03 RX ADMIN — ALPRAZOLAM 0.5 MG: 0.5 TABLET ORAL at 22:23

## 2019-02-03 RX ADMIN — AMIODARONE HYDROCHLORIDE 200 MG: 200 TABLET ORAL at 13:28

## 2019-02-03 RX ADMIN — HYDRALAZINE HYDROCHLORIDE 50 MG: 50 TABLET, FILM COATED ORAL at 20:30

## 2019-02-03 RX ADMIN — ISOSORBIDE MONONITRATE 60 MG: 60 TABLET ORAL at 10:59

## 2019-02-03 RX ADMIN — HYDRALAZINE HYDROCHLORIDE 50 MG: 50 TABLET, FILM COATED ORAL at 13:28

## 2019-02-03 RX ADMIN — TAMSULOSIN HYDROCHLORIDE 0.4 MG: 0.4 CAPSULE ORAL at 10:59

## 2019-02-03 RX ADMIN — ATORVASTATIN CALCIUM 40 MG: 40 TABLET, FILM COATED ORAL at 10:59

## 2019-02-03 RX ADMIN — HYDRALAZINE HYDROCHLORIDE 50 MG: 50 TABLET, FILM COATED ORAL at 10:59

## 2019-02-03 RX ADMIN — LINAGLIPTIN 5 MG: 5 TABLET, FILM COATED ORAL at 10:59

## 2019-02-03 RX ADMIN — SODIUM CHLORIDE: 9 INJECTION, SOLUTION INTRAVENOUS at 04:47

## 2019-02-03 RX ADMIN — BUMETANIDE 1 MG: 0.25 INJECTION INTRAMUSCULAR; INTRAVENOUS at 16:12

## 2019-02-03 RX ADMIN — METOPROLOL TARTRATE 50 MG: 50 TABLET, FILM COATED ORAL at 20:30

## 2019-02-03 RX ADMIN — BUMETANIDE 1 MG: 0.25 INJECTION INTRAMUSCULAR; INTRAVENOUS at 06:07

## 2019-02-03 RX ADMIN — ACETAMINOPHEN 650 MG: 325 TABLET ORAL at 20:29

## 2019-02-03 RX ADMIN — Medication 10 ML: at 20:30

## 2019-02-03 RX ADMIN — MEXILETINE HYDROCHLORIDE 300 MG: 150 CAPSULE ORAL at 20:29

## 2019-02-03 RX ADMIN — Medication 6 MG: at 22:23

## 2019-02-03 ASSESSMENT — ENCOUNTER SYMPTOMS
BACK PAIN: 0
VOMITING: 0
ABDOMINAL PAIN: 0
NAUSEA: 0
SORE THROAT: 0
DIARRHEA: 0
BLOOD IN STOOL: 0
COUGH: 1
SHORTNESS OF BREATH: 0
WHEEZING: 0

## 2019-02-03 ASSESSMENT — PAIN DESCRIPTION - PAIN TYPE
TYPE: ACUTE PAIN

## 2019-02-03 ASSESSMENT — PAIN DESCRIPTION - ORIENTATION
ORIENTATION: LEFT
ORIENTATION: LEFT

## 2019-02-03 ASSESSMENT — PAIN DESCRIPTION - LOCATION
LOCATION: HEAD
LOCATION: CHEST
LOCATION: CHEST

## 2019-02-03 ASSESSMENT — PAIN SCALES - GENERAL
PAINLEVEL_OUTOF10: 2
PAINLEVEL_OUTOF10: 5
PAINLEVEL_OUTOF10: 5
PAINLEVEL_OUTOF10: 4
PAINLEVEL_OUTOF10: 0

## 2019-02-03 ASSESSMENT — PAIN DESCRIPTION - DESCRIPTORS
DESCRIPTORS: DISCOMFORT;DULL
DESCRIPTORS: DISCOMFORT

## 2019-02-04 ENCOUNTER — TELEPHONE (OUTPATIENT)
Dept: FAMILY MEDICINE CLINIC | Age: 54
End: 2019-02-04

## 2019-02-04 VITALS
BODY MASS INDEX: 34.29 KG/M2 | HEIGHT: 74 IN | TEMPERATURE: 98.4 F | DIASTOLIC BLOOD PRESSURE: 83 MMHG | RESPIRATION RATE: 20 BRPM | OXYGEN SATURATION: 97 % | HEART RATE: 60 BPM | SYSTOLIC BLOOD PRESSURE: 159 MMHG | WEIGHT: 267.2 LBS

## 2019-02-04 LAB
ANION GAP SERPL CALCULATED.3IONS-SCNC: 14 MEQ/L (ref 8–16)
AVERAGE GLUCOSE: 99 MG/DL (ref 70–126)
BUN BLDV-MCNC: 19 MG/DL (ref 7–22)
CALCIUM SERPL-MCNC: 8.9 MG/DL (ref 8.5–10.5)
CHLORIDE BLD-SCNC: 105 MEQ/L (ref 98–111)
CHOLESTEROL, TOTAL: 181 MG/DL (ref 100–199)
CO2: 22 MEQ/L (ref 23–33)
CREAT SERPL-MCNC: 1.4 MG/DL (ref 0.4–1.2)
ERYTHROCYTE [DISTWIDTH] IN BLOOD BY AUTOMATED COUNT: 13.3 % (ref 11.5–14.5)
ERYTHROCYTE [DISTWIDTH] IN BLOOD BY AUTOMATED COUNT: 49.1 FL (ref 35–45)
GFR SERPL CREATININE-BSD FRML MDRD: 53 ML/MIN/1.73M2
GLUCOSE BLD-MCNC: 120 MG/DL (ref 70–108)
GLUCOSE BLD-MCNC: 122 MG/DL (ref 70–108)
GLUCOSE BLD-MCNC: 143 MG/DL (ref 70–108)
HBA1C MFR BLD: 5.3 % (ref 4.4–6.4)
HCT VFR BLD CALC: 31.8 % (ref 42–52)
HDLC SERPL-MCNC: 32 MG/DL
HEMOGLOBIN: 10.6 GM/DL (ref 14–18)
INR BLD: 1.69 (ref 0.85–1.13)
LDL CHOLESTEROL CALCULATED: 119 MG/DL
LV EF: 43 %
LVEF MODALITY: NORMAL
MAGNESIUM: 2.1 MG/DL (ref 1.6–2.4)
MCH RBC QN AUTO: 33.4 PG (ref 26–33)
MCHC RBC AUTO-ENTMCNC: 33.3 GM/DL (ref 32.2–35.5)
MCV RBC AUTO: 100.3 FL (ref 80–94)
PLATELET # BLD: 137 THOU/MM3 (ref 130–400)
PMV BLD AUTO: 10.4 FL (ref 9.4–12.4)
POTASSIUM SERPL-SCNC: 3.5 MEQ/L (ref 3.5–5.2)
RBC # BLD: 3.17 MILL/MM3 (ref 4.7–6.1)
SODIUM BLD-SCNC: 141 MEQ/L (ref 135–145)
TRIGL SERPL-MCNC: 148 MG/DL (ref 0–199)
WBC # BLD: 6.7 THOU/MM3 (ref 4.8–10.8)

## 2019-02-04 PROCEDURE — 80048 BASIC METABOLIC PNL TOTAL CA: CPT

## 2019-02-04 PROCEDURE — 85610 PROTHROMBIN TIME: CPT

## 2019-02-04 PROCEDURE — 2580000003 HC RX 258: Performed by: INTERNAL MEDICINE

## 2019-02-04 PROCEDURE — 85027 COMPLETE CBC AUTOMATED: CPT

## 2019-02-04 PROCEDURE — 80061 LIPID PANEL: CPT

## 2019-02-04 PROCEDURE — 6370000000 HC RX 637 (ALT 250 FOR IP): Performed by: INTERNAL MEDICINE

## 2019-02-04 PROCEDURE — 36415 COLL VENOUS BLD VENIPUNCTURE: CPT

## 2019-02-04 PROCEDURE — 82948 REAGENT STRIP/BLOOD GLUCOSE: CPT

## 2019-02-04 PROCEDURE — 2500000003 HC RX 250 WO HCPCS: Performed by: INTERNAL MEDICINE

## 2019-02-04 PROCEDURE — 99232 SBSQ HOSP IP/OBS MODERATE 35: CPT | Performed by: NURSE PRACTITIONER

## 2019-02-04 PROCEDURE — 83735 ASSAY OF MAGNESIUM: CPT

## 2019-02-04 PROCEDURE — 99238 HOSP IP/OBS DSCHRG MGMT 30/<: CPT | Performed by: HOSPITALIST

## 2019-02-04 PROCEDURE — 93306 TTE W/DOPPLER COMPLETE: CPT

## 2019-02-04 PROCEDURE — 83036 HEMOGLOBIN GLYCOSYLATED A1C: CPT

## 2019-02-04 RX ORDER — BUMETANIDE 2 MG/1
2 TABLET ORAL DAILY
Qty: 30 TABLET | Refills: 1 | Status: SHIPPED | OUTPATIENT
Start: 2019-02-04 | End: 2019-03-24 | Stop reason: SDUPTHER

## 2019-02-04 RX ORDER — CLOPIDOGREL BISULFATE 75 MG/1
75 TABLET ORAL DAILY
Qty: 30 TABLET | Refills: 1 | Status: SHIPPED | OUTPATIENT
Start: 2019-02-04 | End: 2019-03-24 | Stop reason: SDUPTHER

## 2019-02-04 RX ORDER — POTASSIUM CHLORIDE 20 MEQ/1
20 TABLET, EXTENDED RELEASE ORAL DAILY
Qty: 30 TABLET | Refills: 1 | Status: SHIPPED | OUTPATIENT
Start: 2019-02-04 | End: 2019-09-30 | Stop reason: SDUPTHER

## 2019-02-04 RX ADMIN — METOPROLOL TARTRATE 50 MG: 50 TABLET, FILM COATED ORAL at 15:26

## 2019-02-04 RX ADMIN — AMIODARONE HYDROCHLORIDE 200 MG: 200 TABLET ORAL at 09:18

## 2019-02-04 RX ADMIN — TAMSULOSIN HYDROCHLORIDE 0.4 MG: 0.4 CAPSULE ORAL at 09:18

## 2019-02-04 RX ADMIN — HYDRALAZINE HYDROCHLORIDE 50 MG: 50 TABLET, FILM COATED ORAL at 05:47

## 2019-02-04 RX ADMIN — METOPROLOL TARTRATE 50 MG: 50 TABLET, FILM COATED ORAL at 05:47

## 2019-02-04 RX ADMIN — ISOSORBIDE MONONITRATE 60 MG: 60 TABLET ORAL at 09:18

## 2019-02-04 RX ADMIN — ATORVASTATIN CALCIUM 40 MG: 40 TABLET, FILM COATED ORAL at 09:18

## 2019-02-04 RX ADMIN — Medication 2 UNITS: at 12:21

## 2019-02-04 RX ADMIN — TICAGRELOR 90 MG: 90 TABLET ORAL at 09:18

## 2019-02-04 RX ADMIN — HYDRALAZINE HYDROCHLORIDE 50 MG: 50 TABLET, FILM COATED ORAL at 15:26

## 2019-02-04 RX ADMIN — LINAGLIPTIN 5 MG: 5 TABLET, FILM COATED ORAL at 09:18

## 2019-02-04 RX ADMIN — BUMETANIDE 1 MG: 0.25 INJECTION INTRAMUSCULAR; INTRAVENOUS at 05:48

## 2019-02-04 RX ADMIN — MEXILETINE HYDROCHLORIDE 300 MG: 150 CAPSULE ORAL at 05:48

## 2019-02-04 RX ADMIN — Medication 10 ML: at 09:10

## 2019-02-04 ASSESSMENT — PAIN SCALES - GENERAL: PAINLEVEL_OUTOF10: 0

## 2019-02-05 ENCOUNTER — TELEPHONE (OUTPATIENT)
Dept: FAMILY MEDICINE CLINIC | Age: 54
End: 2019-02-05

## 2019-02-08 LAB
BLOOD CULTURE, ROUTINE: NORMAL
BLOOD CULTURE, ROUTINE: NORMAL

## 2019-02-11 ENCOUNTER — OFFICE VISIT (OUTPATIENT)
Dept: FAMILY MEDICINE CLINIC | Age: 54
End: 2019-02-11
Payer: COMMERCIAL

## 2019-02-11 VITALS
OXYGEN SATURATION: 98 % | SYSTOLIC BLOOD PRESSURE: 158 MMHG | HEART RATE: 56 BPM | WEIGHT: 274.1 LBS | DIASTOLIC BLOOD PRESSURE: 88 MMHG | BODY MASS INDEX: 35.19 KG/M2 | RESPIRATION RATE: 12 BRPM

## 2019-02-11 DIAGNOSIS — E11.22 CONTROLLED TYPE 2 DIABETES MELLITUS WITH CHRONIC KIDNEY DISEASE, WITHOUT LONG-TERM CURRENT USE OF INSULIN, UNSPECIFIED CKD STAGE (HCC): ICD-10-CM

## 2019-02-11 DIAGNOSIS — Z79.01 ANTICOAGULATED ON COUMADIN: ICD-10-CM

## 2019-02-11 DIAGNOSIS — I50.22 CHRONIC SYSTOLIC CONGESTIVE HEART FAILURE (HCC): ICD-10-CM

## 2019-02-11 DIAGNOSIS — E78.2 MIXED HYPERLIPIDEMIA: ICD-10-CM

## 2019-02-11 DIAGNOSIS — F06.4 ANXIETY DISORDER DUE TO KNOWN PHYSIOLOGICAL CONDITION: ICD-10-CM

## 2019-02-11 DIAGNOSIS — I25.810 CORONARY ARTERY DISEASE INVOLVING CORONARY BYPASS GRAFT OF NATIVE HEART WITHOUT ANGINA PECTORIS: ICD-10-CM

## 2019-02-11 DIAGNOSIS — I48.0 PAROXYSMAL ATRIAL FIBRILLATION (HCC): ICD-10-CM

## 2019-02-11 DIAGNOSIS — I10 ESSENTIAL HYPERTENSION: ICD-10-CM

## 2019-02-11 DIAGNOSIS — I50.43 ACUTE ON CHRONIC COMBINED SYSTOLIC AND DIASTOLIC CONGESTIVE HEART FAILURE (HCC): Primary | ICD-10-CM

## 2019-02-11 DIAGNOSIS — Z95.810 ICD (IMPLANTABLE CARDIOVERTER-DEFIBRILLATOR) IN PLACE: ICD-10-CM

## 2019-02-11 PROCEDURE — 99495 TRANSJ CARE MGMT MOD F2F 14D: CPT | Performed by: NURSE PRACTITIONER

## 2019-02-11 RX ORDER — HYDRALAZINE HYDROCHLORIDE 50 MG/1
TABLET, FILM COATED ORAL
Qty: 135 TABLET | Refills: 0 | Status: SHIPPED | OUTPATIENT
Start: 2019-02-11 | End: 2019-03-18 | Stop reason: SDUPTHER

## 2019-02-12 ASSESSMENT — ENCOUNTER SYMPTOMS
NAUSEA: 0
CHEST TIGHTNESS: 0
BACK PAIN: 1
COUGH: 0
ABDOMINAL PAIN: 0
SHORTNESS OF BREATH: 0

## 2019-02-26 ENCOUNTER — OFFICE VISIT (OUTPATIENT)
Dept: CARDIOLOGY CLINIC | Age: 54
End: 2019-02-26
Payer: COMMERCIAL

## 2019-02-26 ENCOUNTER — NURSE ONLY (OUTPATIENT)
Dept: CARDIOLOGY CLINIC | Age: 54
End: 2019-02-26
Payer: COMMERCIAL

## 2019-02-26 VITALS
OXYGEN SATURATION: 96 % | BODY MASS INDEX: 34.91 KG/M2 | WEIGHT: 272 LBS | HEIGHT: 74 IN | HEART RATE: 58 BPM | DIASTOLIC BLOOD PRESSURE: 82 MMHG | SYSTOLIC BLOOD PRESSURE: 144 MMHG

## 2019-02-26 DIAGNOSIS — Z95.810 PRESENCE OF COMBINATION INTERNAL CARDIAC DEFIBRILLATOR (ICD) AND PACEMAKER: Primary | ICD-10-CM

## 2019-02-26 DIAGNOSIS — I50.42 CHF (CONGESTIVE HEART FAILURE), NYHA CLASS II, CHRONIC, COMBINED (HCC): Primary | ICD-10-CM

## 2019-02-26 PROCEDURE — 99213 OFFICE O/P EST LOW 20 MIN: CPT | Performed by: NURSE PRACTITIONER

## 2019-02-26 PROCEDURE — 93283 PRGRMG EVAL IMPLANTABLE DFB: CPT | Performed by: INTERNAL MEDICINE

## 2019-02-26 ASSESSMENT — ENCOUNTER SYMPTOMS
COLOR CHANGE: 0
APNEA: 0
NAUSEA: 0
SHORTNESS OF BREATH: 1
ABDOMINAL PAIN: 0
ABDOMINAL DISTENTION: 0
CHEST TIGHTNESS: 0
WHEEZING: 0
COUGH: 0

## 2019-03-03 DIAGNOSIS — E11.22 CONTROLLED TYPE 2 DIABETES MELLITUS WITH CHRONIC KIDNEY DISEASE, WITHOUT LONG-TERM CURRENT USE OF INSULIN, UNSPECIFIED CKD STAGE (HCC): ICD-10-CM

## 2019-03-03 DIAGNOSIS — S39.012A STRAIN OF LUMBAR REGION, INITIAL ENCOUNTER: ICD-10-CM

## 2019-03-04 RX ORDER — TAMSULOSIN HYDROCHLORIDE 0.4 MG/1
CAPSULE ORAL
Qty: 90 CAPSULE | Refills: 3 | Status: SHIPPED | OUTPATIENT
Start: 2019-03-04 | End: 2019-03-18 | Stop reason: SDUPTHER

## 2019-03-04 RX ORDER — SACUBITRIL AND VALSARTAN 49; 51 MG/1; MG/1
TABLET, FILM COATED ORAL
Qty: 180 TABLET | Refills: 2 | Status: SHIPPED | OUTPATIENT
Start: 2019-03-04 | End: 2019-06-04 | Stop reason: ALTCHOICE

## 2019-03-13 ENCOUNTER — OFFICE VISIT (OUTPATIENT)
Dept: CARDIOLOGY CLINIC | Age: 54
End: 2019-03-13
Payer: COMMERCIAL

## 2019-03-13 VITALS
SYSTOLIC BLOOD PRESSURE: 150 MMHG | BODY MASS INDEX: 35.29 KG/M2 | DIASTOLIC BLOOD PRESSURE: 80 MMHG | WEIGHT: 275 LBS | HEIGHT: 74 IN | HEART RATE: 60 BPM

## 2019-03-13 DIAGNOSIS — I25.5 ISCHEMIC CARDIOMYOPATHY: Primary | ICD-10-CM

## 2019-03-13 DIAGNOSIS — F17.200 SMOKING: ICD-10-CM

## 2019-03-13 DIAGNOSIS — Z95.810 ICD (IMPLANTABLE CARDIOVERTER-DEFIBRILLATOR) IN PLACE: ICD-10-CM

## 2019-03-13 DIAGNOSIS — I10 ESSENTIAL HYPERTENSION: ICD-10-CM

## 2019-03-13 DIAGNOSIS — I48.20 CHRONIC ATRIAL FIBRILLATION (HCC): ICD-10-CM

## 2019-03-13 PROCEDURE — 99214 OFFICE O/P EST MOD 30 MIN: CPT | Performed by: NUCLEAR MEDICINE

## 2019-03-15 ENCOUNTER — PROCEDURE VISIT (OUTPATIENT)
Dept: CARDIOLOGY CLINIC | Age: 54
End: 2019-03-15

## 2019-03-15 DIAGNOSIS — I50.22 CHRONIC SYSTOLIC CONGESTIVE HEART FAILURE (HCC): Primary | ICD-10-CM

## 2019-03-18 DIAGNOSIS — E11.22 CONTROLLED TYPE 2 DIABETES MELLITUS WITH CHRONIC KIDNEY DISEASE, WITHOUT LONG-TERM CURRENT USE OF INSULIN, UNSPECIFIED CKD STAGE (HCC): ICD-10-CM

## 2019-03-18 DIAGNOSIS — S39.012A STRAIN OF LUMBAR REGION, INITIAL ENCOUNTER: ICD-10-CM

## 2019-03-18 DIAGNOSIS — I48.0 PAROXYSMAL ATRIAL FIBRILLATION (HCC): ICD-10-CM

## 2019-03-18 DIAGNOSIS — I10 ESSENTIAL HYPERTENSION: ICD-10-CM

## 2019-03-18 RX ORDER — HYDRALAZINE HYDROCHLORIDE 50 MG/1
TABLET, FILM COATED ORAL
Qty: 135 TABLET | Refills: 5 | Status: SHIPPED | OUTPATIENT
Start: 2019-03-18 | End: 2019-03-24 | Stop reason: SDUPTHER

## 2019-03-18 RX ORDER — TAMSULOSIN HYDROCHLORIDE 0.4 MG/1
0.4 CAPSULE ORAL DAILY
Qty: 90 CAPSULE | Refills: 3 | Status: SHIPPED | OUTPATIENT
Start: 2019-03-18 | End: 2019-09-04

## 2019-03-18 RX ORDER — AMIODARONE HYDROCHLORIDE 200 MG/1
TABLET ORAL
Qty: 180 TABLET | Refills: 3 | OUTPATIENT
Start: 2019-03-18

## 2019-03-18 RX ORDER — CLOPIDOGREL BISULFATE 75 MG/1
75 TABLET ORAL DAILY
Qty: 30 TABLET | Refills: 1 | OUTPATIENT
Start: 2019-03-18

## 2019-03-18 RX ORDER — BUMETANIDE 2 MG/1
2 TABLET ORAL DAILY
Qty: 30 TABLET | Refills: 1 | OUTPATIENT
Start: 2019-03-18

## 2019-03-18 RX ORDER — ISOSORBIDE MONONITRATE 60 MG/1
60 TABLET, EXTENDED RELEASE ORAL DAILY
Qty: 90 TABLET | Refills: 2 | OUTPATIENT
Start: 2019-03-18

## 2019-03-24 RX ORDER — CLOPIDOGREL BISULFATE 75 MG/1
75 TABLET ORAL DAILY
Qty: 30 TABLET | Refills: 1 | Status: SHIPPED | OUTPATIENT
Start: 2019-03-24 | End: 2019-06-04 | Stop reason: SDUPTHER

## 2019-03-24 RX ORDER — AMIODARONE HYDROCHLORIDE 200 MG/1
TABLET ORAL
Qty: 180 TABLET | Refills: 3 | Status: SHIPPED | OUTPATIENT
Start: 2019-03-24 | End: 2019-06-04 | Stop reason: ALTCHOICE

## 2019-03-24 RX ORDER — ISOSORBIDE MONONITRATE 60 MG/1
60 TABLET, EXTENDED RELEASE ORAL DAILY
Qty: 90 TABLET | Refills: 2 | Status: SHIPPED | OUTPATIENT
Start: 2019-03-24 | End: 2019-09-13 | Stop reason: SDUPTHER

## 2019-03-24 RX ORDER — HYDRALAZINE HYDROCHLORIDE 50 MG/1
TABLET, FILM COATED ORAL
Qty: 135 TABLET | Refills: 5 | Status: SHIPPED | OUTPATIENT
Start: 2019-03-24 | End: 2019-08-12 | Stop reason: SDUPTHER

## 2019-03-24 RX ORDER — BUMETANIDE 2 MG/1
2 TABLET ORAL DAILY
Qty: 30 TABLET | Refills: 1 | Status: SHIPPED | OUTPATIENT
Start: 2019-03-24 | End: 2019-06-04 | Stop reason: ALTCHOICE

## 2019-03-26 ENCOUNTER — TELEPHONE (OUTPATIENT)
Dept: UROLOGY | Age: 54
End: 2019-03-26

## 2019-03-26 NOTE — TELEPHONE ENCOUNTER
PAOLO left a message for the patient to call the office to schedule the ultrasound and to have PSA done prior to his appointment on 03-. Patient was originally scheduled for his ultrasound on 10- at 11:00am by [employer], but patient no showed for his appointment.

## 2019-03-26 NOTE — TELEPHONE ENCOUNTER
Please see message per Abdiaziz Kessler. Can you please contact patient to have the [address] scheduled prior to appointment? He may need to have the follow up appointment moved. Thank you.

## 2019-03-29 ENCOUNTER — OFFICE VISIT (OUTPATIENT)
Dept: UROLOGY | Age: 54
End: 2019-03-29
Payer: COMMERCIAL

## 2019-03-29 VITALS
HEIGHT: 74 IN | BODY MASS INDEX: 35.42 KG/M2 | WEIGHT: 276 LBS | DIASTOLIC BLOOD PRESSURE: 84 MMHG | SYSTOLIC BLOOD PRESSURE: 128 MMHG

## 2019-03-29 DIAGNOSIS — N40.0 BENIGN PROSTATIC HYPERPLASIA, UNSPECIFIED WHETHER LOWER URINARY TRACT SYMPTOMS PRESENT: Primary | ICD-10-CM

## 2019-03-29 DIAGNOSIS — R97.20 ELEVATED PSA: ICD-10-CM

## 2019-03-29 LAB — POST VOID RESIDUAL (PVR): 112 ML

## 2019-03-29 PROCEDURE — 99214 OFFICE O/P EST MOD 30 MIN: CPT | Performed by: NURSE PRACTITIONER

## 2019-03-29 PROCEDURE — 51798 US URINE CAPACITY MEASURE: CPT | Performed by: NURSE PRACTITIONER

## 2019-04-04 ENCOUNTER — HOSPITAL ENCOUNTER (OUTPATIENT)
Dept: ULTRASOUND IMAGING | Age: 54
Discharge: HOME | End: 2019-04-04
Payer: COMMERCIAL

## 2019-04-04 DIAGNOSIS — N49.2 SCROTAL ABSCESS: ICD-10-CM

## 2019-04-04 PROCEDURE — 76870 US EXAM SCROTUM: CPT

## 2019-04-05 ENCOUNTER — TELEPHONE (OUTPATIENT)
Dept: UROLOGY | Age: 54
End: 2019-04-05
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
## 2019-04-09 ENCOUNTER — TELEPHONE (OUTPATIENT)
Dept: CARDIOLOGY CLINIC | Age: 54
End: 2019-04-09

## 2019-04-10 NOTE — TELEPHONE ENCOUNTER
Spoke with patient regarding appointment  He states he does not get off work until 330 and can not leave early. Ok to schedule later appointment on 5/23?

## 2019-04-22 ENCOUNTER — PROCEDURE VISIT (OUTPATIENT)
Dept: CARDIOLOGY CLINIC | Age: 54
End: 2019-04-22
Payer: COMMERCIAL

## 2019-04-22 DIAGNOSIS — I50.22 CHRONIC SYSTOLIC CONGESTIVE HEART FAILURE (HCC): Primary | ICD-10-CM

## 2019-04-22 PROCEDURE — 93297 REM INTERROG DEV EVAL ICPMS: CPT | Performed by: NUCLEAR MEDICINE

## 2019-04-22 PROCEDURE — 93299 PR REM INTERROG ICPMS/SCRMS <30 D TECH REVIEW: CPT | Performed by: NUCLEAR MEDICINE

## 2019-05-22 ENCOUNTER — TELEPHONE (OUTPATIENT)
Dept: CARDIOLOGY CLINIC | Age: 54
End: 2019-05-22

## 2019-05-22 NOTE — TELEPHONE ENCOUNTER
Pt needs to reschedule and is having a shift change in the next week and will need to call us back once he gets his new schedule

## 2019-05-24 ENCOUNTER — PROCEDURE VISIT (OUTPATIENT)
Dept: CARDIOLOGY CLINIC | Age: 54
End: 2019-05-24
Payer: COMMERCIAL

## 2019-05-24 DIAGNOSIS — Z95.810 ICD (IMPLANTABLE CARDIOVERTER-DEFIBRILLATOR) IN PLACE: Primary | ICD-10-CM

## 2019-05-24 PROCEDURE — 93296 REM INTERROG EVL PM/IDS: CPT | Performed by: INTERNAL MEDICINE

## 2019-05-24 PROCEDURE — 93295 DEV INTERROG REMOTE 1/2/MLT: CPT | Performed by: INTERNAL MEDICINE

## 2019-05-28 RX ORDER — MEXILETINE HYDROCHLORIDE 150 MG/1
CAPSULE ORAL
Qty: 540 CAPSULE | Refills: 1 | Status: SHIPPED | OUTPATIENT
Start: 2019-05-28 | End: 2019-11-27 | Stop reason: SDUPTHER

## 2019-05-28 NOTE — TELEPHONE ENCOUNTER
05/28/2019   Bria Casillas called requesting a refill on the following medications:  Requested Prescriptions     Pending Prescriptions Disp Refills    mexiletine (MEXITIL) 150 MG capsule 540 capsule 1     Pharmacy verified:  [email or web address]      Date of last visit: 03/13/2019  Date of next visit (if applicable): 7/86/0684

## 2019-06-04 ENCOUNTER — OFFICE VISIT (OUTPATIENT)
Dept: FAMILY MEDICINE CLINIC | Age: 54
End: 2019-06-04
Payer: COMMERCIAL

## 2019-06-04 VITALS
RESPIRATION RATE: 20 BRPM | HEART RATE: 96 BPM | OXYGEN SATURATION: 99 % | BODY MASS INDEX: 34.6 KG/M2 | SYSTOLIC BLOOD PRESSURE: 134 MMHG | WEIGHT: 269.5 LBS | DIASTOLIC BLOOD PRESSURE: 62 MMHG

## 2019-06-04 DIAGNOSIS — Z79.01 ANTICOAGULATED ON COUMADIN: ICD-10-CM

## 2019-06-04 DIAGNOSIS — E78.2 MIXED HYPERLIPIDEMIA: ICD-10-CM

## 2019-06-04 DIAGNOSIS — F41.3 OTHER MIXED ANXIETY DISORDERS: ICD-10-CM

## 2019-06-04 DIAGNOSIS — E11.22 CONTROLLED TYPE 2 DIABETES MELLITUS WITH CHRONIC KIDNEY DISEASE, WITHOUT LONG-TERM CURRENT USE OF INSULIN, UNSPECIFIED CKD STAGE (HCC): ICD-10-CM

## 2019-06-04 DIAGNOSIS — Z95.1 HX OF CABG: ICD-10-CM

## 2019-06-04 DIAGNOSIS — Z95.810 ICD (IMPLANTABLE CARDIOVERTER-DEFIBRILLATOR) IN PLACE: ICD-10-CM

## 2019-06-04 DIAGNOSIS — I10 ESSENTIAL HYPERTENSION: ICD-10-CM

## 2019-06-04 DIAGNOSIS — Z00.00 WELL ADULT EXAM: Primary | ICD-10-CM

## 2019-06-04 DIAGNOSIS — I50.22 CHRONIC SYSTOLIC CONGESTIVE HEART FAILURE (HCC): ICD-10-CM

## 2019-06-04 DIAGNOSIS — I25.810 CORONARY ARTERY DISEASE INVOLVING CORONARY BYPASS GRAFT OF NATIVE HEART WITHOUT ANGINA PECTORIS: ICD-10-CM

## 2019-06-04 DIAGNOSIS — Z12.11 ENCOUNTER FOR SCREENING COLONOSCOPY: ICD-10-CM

## 2019-06-04 DIAGNOSIS — N18.30 CKD (CHRONIC KIDNEY DISEASE), STAGE III (HCC): ICD-10-CM

## 2019-06-04 PROCEDURE — 99396 PREV VISIT EST AGE 40-64: CPT | Performed by: NURSE PRACTITIONER

## 2019-06-04 RX ORDER — CLOPIDOGREL BISULFATE 75 MG/1
75 TABLET ORAL DAILY
Qty: 30 TABLET | Refills: 5 | Status: SHIPPED | OUTPATIENT
Start: 2019-06-04 | End: 2020-01-20 | Stop reason: SDUPTHER

## 2019-06-04 RX ORDER — ALPRAZOLAM 0.5 MG/1
TABLET ORAL
Qty: 30 TABLET | Refills: 4 | Status: SHIPPED | OUTPATIENT
Start: 2019-06-04 | End: 2019-12-04 | Stop reason: SDUPTHER

## 2019-06-04 ASSESSMENT — ENCOUNTER SYMPTOMS
COUGH: 0
ABDOMINAL PAIN: 0
SHORTNESS OF BREATH: 0
BACK PAIN: 1
NAUSEA: 0
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
## 2019-06-04 NOTE — PROGRESS NOTES
Chronic Disease Visit Information    BP Readings from Last 3 Encounters:   03/29/19 128/84   03/13/19 (!) 150/80   02/26/19 (!) 144/82          Hemoglobin A1C (%)   Date Value   02/04/2019 5.3   12/10/2018 5.6   05/14/2018 6.8 (H)     Microalbumin, Random Urine (mg/dL)   Date Value   09/19/2018 87.45     LDL Calculated (mg/dL)   Date Value   02/04/2019 119     HDL (mg/dL)   Date Value   02/04/2019 32     BUN (mg/dL)   Date Value   02/04/2019 19     CREATININE (mg/dL)   Date Value   02/04/2019 1.4 (H)     Glucose (mg/dL)   Date Value   02/04/2019 122 (H)            Have you changed or started any medications since your last visit including any over-the-counter medicines, vitamins, or herbal medicines? no   Are you having any side effects from any of your medications? -  no  Have you stopped taking any of your medications? Is so, why? -  yes - medication list updated    Have you seen any other physician or provider since your last visit? No  Have you had any other diagnostic tests since your last visit? No  Have you been seen in the emergency room and/or had an admission to a hospital since we last saw you? No  Have you had your annual diabetic retinal (eye) exam? No    Have you activated your [employer] account? If not, what are your barriers?  No:      Patient Care Team:  ISABELLA Hummel CNP as PCP - General (Certified Nurse Practitioner)  ISABELLA Hummel CNP as PCP - [facility]  Sandeep Mckee MD as Consulting Physician (Orthopedic Surgery)  Cheryl Hutchins MD as Consulting Physician (Cardiology)         Medical History Review  Past Medical, Family, and Social History reviewed and does contribute to the patient presenting condition    Health Maintenance   Topic Date Due    Pneumococcal 0-64 years Vaccine (1 of 3 - PCV13) 05/20/1971    Hepatitis B Vaccine (1 of 3 - Risk 3-dose series) 05/20/1984    Colon cancer screen colonoscopy  05/20/2015    Diabetic retinal exam  12/15/2016    Flu vaccine (Season Ended) 09/01/2019    Diabetic microalbuminuria test  09/19/2019    Diabetic foot exam  12/10/2019    TSH testing  02/03/2020    A1C test (Diabetic or Prediabetic)  02/04/2020    Lipid screen  02/04/2020    Potassium monitoring  02/04/2020    Creatinine monitoring  02/04/2020    DTaP/Tdap/Td vaccine (2 - Td) 06/25/2027    Hepatitis C screen  Addressed    HIV screen  Addressed       Current Tobacco Use    Social History     Tobacco Use   Smoking Status Current Every Day Smoker    Packs/day: 1.00    Years: 32.00    Pack years: 32.00    Types: Cigarettes    Start date: 7/16/1980   Smokeless Tobacco Former User     Ready to quit: No  Counseling given: Yes     Are you motivated to quit? - no  If yes, have you set a date to quite? - no    Have you tried any anti-smoking aids in the past? -  yes -      1-800-QUIT-NOW (1-425.276.1400)     Medical History Review  Past Medical, Family, and Social History reviewed and does contribute to the patient presenting condition    Health Maintenance   Topic Date Due    Pneumococcal 0-64 years Vaccine (1 of 3 - PCV13) 05/20/1971    Hepatitis B Vaccine (1 of 3 - Risk 3-dose series) 05/20/1984    Colon cancer screen colonoscopy  05/20/2015    Diabetic retinal exam  12/15/2016    Flu vaccine (Season Ended) 09/01/2019    Diabetic microalbuminuria test  09/19/2019    Diabetic foot exam  12/10/2019    TSH testing  02/03/2020    A1C test (Diabetic or Prediabetic)  02/04/2020    Lipid screen  02/04/2020    Potassium monitoring  02/04/2020    Creatinine monitoring  02/04/2020    DTaP/Tdap/Td vaccine (2 - Td) 06/25/2027    Hepatitis C screen  Addressed    HIV screen  Addressed

## 2019-06-04 NOTE — PATIENT INSTRUCTIONS
You may receive a survey about your visit with us today. The feedback from our patients helps us identify what is working well and where the service to all patients can be enhanced. Thank you! Tobacco Cessation Programs     Telephonic behavior modification  Rober Vanessa ([phone])   Counseling service for those who are ready to quit using tobacco.     Available for uninsured [place] residents, [place] recipients, pregnant women, or patients whose health plans or employers are members of the [address] [place] [address] behavior modification   http://[place]. Quitlogix. org   Online support program to help patients through each step of the quitting process. Available 24 hours a day 7 days a week. Provides up to date researched based tool, step-by-step guides, and motivational messages. Online behavior modification   www.lungusa.org/stop-smoking/how-to-quit   HelpLine: 1-800-LUNG-USA (943-8864)   Email questions to:  Kang@[employer]. ConnectFu    Website offers resources to help tobacco users figure out their reasons for quitting and then take the big step of quitting for good. Hypnosis   Location: [facility], [facility] [employer] [employer], [place]   Contact: Sancho Tate, PhD at [phone]   Hypnosis for tobacco cessation   Cost $225 for the initial session and $175 for each session afterwards. Most patients require 6-8 sessions. There is the option to submit through the patients insurance. Hypnosis and behavior modification   Location: [place] [address],  [address] [place]RICIO RODRIGUEZ AM [email or web address], [place]   Contact: Linda Templeton, PhD at [phone]  [facility] Counseling and hypnosis for nicotine addition   Cost: For uninsured patients:  Please call above phone number  Cost for insured patients depends on patients insurance plan.     Behavior modification   Location: [facility], [address] [place] R[place]RIGUEZ [place], [address]: [address] four one-on-one appointments between the patient and a respiratory therapist.  The four appointments span over three weeks. The respiratory therapist schedules one of the appointments to occur 48 hours after the patients quit date.  Cost $100 total for the four sessions. Tobacco cessation products are not included in the cost and are not provided by [facility], [place].         Patient Education        Learning About Diuretics for High Blood Pressure  Introduction  Diuretics help to lower blood pressure. This reduces your risk of a heart attack and stroke. It also reduces your risk of kidney disease. Diuretics cause your kidneys to remove sodium and water. They also relax the blood vessel walls. These help lower your blood pressure. Examples  · Chlorthalidone  · Hydrochlorothiazide  Possible side effects  There are some common side effects. They are:  · Too little potassium. · Feeling dizzy. · Rash. · Urinating a lot. · High blood sugar. (But this is not common.)  You may have other side effects. Check the information that comes with your medicine. What to know about taking this medicine  · You may take other medicines for blood pressure. Diuretics can help those work better. They can also prevent extra fluid in your body. · You may need to take potassium pills. Or you may have to watch how much potassium is in your food. Ask your doctor about this. · You may need blood tests to check your kidneys and your potassium level. · Take your medicines exactly as prescribed. Call your doctor if you think you are having a problem with your medicine. · Check with your doctor or pharmacist before you use any other medicines. This includes over-the-counter medicines. Make sure your doctor knows all of the medicines, vitamins, herbal products, and supplements you take. Taking some medicines together can cause problems. Where can you learn more? Go to https://timothy.BringMeTheNews. org and sign in to your [employer] account.  Enter H105 in the [facility] box to learn more about \"Learning About Diuretics for High Blood Pressure. \"     If you do not have an account, please click on the \"Sign Up Now\" link. Current as of: July 22, 2018  Content Version: 12.0  © 1262-5579 Healthwise, [employer]. Care instructions adapted under license by [facility] ([facility]). If you have questions about a medical condition or this instruction, always ask your healthcare professional. [address] any warranty or liability for your use of this information. Patient Education        Learning About Diabetes Food Guidelines  Your Care Instructions    Meal planning is important to manage diabetes. It helps keep your blood sugar at a target level (which you set with your doctor). You don't have to eat special foods. You can eat what your family eats, including sweets once in a while. But you do have to pay attention to how often you eat and how much you eat of certain foods. You may want to work with a dietitian or a certified diabetes educator (CDE) to help you plan meals and snacks. A dietitian or CDE can also help you lose weight if that is one of your goals. What should you know about eating carbs? Managing the amount of carbohydrate (carbs) you eat is an important part of healthy meals when you have diabetes. Carbohydrate is found in many foods. · Learn which foods have carbs. And learn the amounts of carbs in different foods. ? Bread, cereal, pasta, and rice have about 15 grams of carbs in a serving. A serving is 1 slice of bread (1 ounce), ½ cup of cooked cereal, or 1/3 cup of cooked pasta or rice. ? Fruits have 15 grams of carbs in a serving. A serving is 1 small fresh fruit, such as an apple or orange; ½ of a banana; ½ cup of cooked or canned fruit; ½ cup of fruit juice; 1 cup of melon or raspberries; or 2 tablespoons of dried fruit. ? Milk and no-sugar-added yogurt have 15 grams of carbs in a serving.  A serving is 1 cup of milk or 2/3 cup of no-sugar-added yogurt. ? Starchy vegetables have 15 grams of carbs in a serving. A serving is ½ cup of mashed potatoes or sweet potato; 1 cup winter squash; ½ of a small baked potato; ½ cup of cooked beans; or ½ cup cooked corn or green peas. · Learn how much carbs to eat each day and at each meal. A dietitian or CDE can teach you how to keep track of the amount of carbs you eat. This is called carbohydrate counting. · If you are not sure how to count carbohydrate grams, use the Plate Method to plan meals. It is a good, quick way to make sure that you have a balanced meal. It also helps you spread carbs throughout the day. ? Divide your plate by types of foods. Put non-starchy vegetables on half the plate, meat or other protein food on one-quarter of the plate, and a grain or starchy vegetable in the final quarter of the plate. To this you can add a small piece of fruit and 1 cup of milk or yogurt, depending on how many carbs you are supposed to eat at a meal.  · Try to eat about the same amount of carbs at each meal. Do not \"save up\" your daily allowance of carbs to eat at one meal.  · Proteins have very little or no carbs per serving. Examples of proteins are beef, chicken, turkey, fish, eggs, tofu, cheese, cottage cheese, and peanut butter. A serving size of meat is 3 ounces, which is about the size of a deck of cards. Examples of meat substitute serving sizes (equal to 1 ounce of meat) are 1/4 cup of cottage cheese, 1 egg, 1 tablespoon of peanut butter, and ½ cup of tofu. How can you eat out and still eat healthy? · Learn to estimate the serving sizes of foods that have carbohydrate. If you measure food at home, it will be easier to estimate the amount in a serving of restaurant food. · If the meal you order has too much carbohydrate (such as potatoes, corn, or baked beans), ask to have a low-carbohydrate food instead. Ask for a salad or green vegetables.   · If you use insulin, check your blood sugar before and after eating out to help you plan how much to eat in the future. · If you eat more carbohydrate at a meal than you had planned, take a walk or do other exercise. This will help lower your blood sugar. What else should you know? · Limit saturated fat, such as the fat from meat and dairy products. This is a healthy choice because people who have diabetes are at higher risk of heart disease. So choose lean cuts of meat and nonfat or low-fat dairy products. Use olive or canola oil instead of butter or shortening when cooking. · Don't skip meals. Your blood sugar may drop too low if you skip meals and take insulin or certain medicines for diabetes. · Check with your doctor before you drink alcohol. Alcohol can cause your blood sugar to drop too low. Alcohol can also cause a bad reaction if you take certain diabetes medicines. Follow-up care is a key part of your treatment and safety. Be sure to make and go to all appointments, and call your doctor if you are having problems. It's also a good idea to know your test results and keep a list of the medicines you take. Where can you learn more? Go to https://[employer].Joyus. org and sign in to your [employer] account. Enter B094 in the [employer] box to learn more about \"Learning About Diabetes Food Guidelines. \"     If you do not have an account, please click on the \"Sign Up Now\" link. Current as of: July 25, 2018  Content Version: 12.0  © 9101-2657 Healthwise, Incorporated. Care instructions adapted under license by [facility] ([facility]). If you have questions about a medical condition or this instruction, always ask your healthcare professional. [place] [address] any warranty or liability for your use of this information.

## 2019-06-05 ENCOUNTER — HOSPITAL ENCOUNTER (OUTPATIENT)
Age: 54
Discharge: HOME | End: 2019-06-05
Payer: COMMERCIAL

## 2019-06-05 DIAGNOSIS — I50.42 CHF (CONGESTIVE HEART FAILURE), NYHA CLASS II, CHRONIC, COMBINED (HCC): ICD-10-CM

## 2019-06-05 DIAGNOSIS — Z79.01 ANTICOAGULATED ON COUMADIN: ICD-10-CM

## 2019-06-05 DIAGNOSIS — R97.20 ELEVATED PSA: ICD-10-CM

## 2019-06-05 LAB
ANION GAP SERPL CALCULATED.3IONS-SCNC: 11 MEQ/L (ref 8–16)
BUN BLDV-MCNC: 21 MG/DL (ref 7–22)
CALCIUM SERPL-MCNC: 9 MG/DL (ref 8.5–10.5)
CHLORIDE BLD-SCNC: 107 MEQ/L (ref 98–111)
CO2: 21 MEQ/L (ref 23–33)
CREAT SERPL-MCNC: 1.6 MG/DL (ref 0.4–1.2)
GFR SERPL CREATININE-BSD FRML MDRD: 45 ML/MIN/1.73M2
GLUCOSE BLD-MCNC: 78 MG/DL (ref 70–108)
HCT VFR BLD CALC: 39.8 % (ref 42–52)
HEMOGLOBIN: 13.2 GM/DL (ref 14–18)
POTASSIUM SERPL-SCNC: 4.9 MEQ/L (ref 3.5–5.2)
PROSTATE SPECIFIC ANTIGEN: 0.27 NG/ML (ref 0–1)
SODIUM BLD-SCNC: 139 MEQ/L (ref 135–145)

## 2019-06-05 PROCEDURE — 36415 COLL VENOUS BLD VENIPUNCTURE: CPT

## 2019-06-05 PROCEDURE — 84153 ASSAY OF PSA TOTAL: CPT

## 2019-06-05 PROCEDURE — 85018 HEMOGLOBIN: CPT

## 2019-06-05 PROCEDURE — 80048 BASIC METABOLIC PNL TOTAL CA: CPT

## 2019-06-05 PROCEDURE — 85014 HEMATOCRIT: CPT

## 2019-06-06 ENCOUNTER — TELEPHONE (OUTPATIENT)
Dept: UROLOGY | Age: 54
End: 2019-06-06

## 2019-06-06 ENCOUNTER — TELEPHONE (OUTPATIENT)
Dept: CARDIOLOGY CLINIC | Age: 54
End: 2019-06-06

## 2019-06-06 NOTE — TELEPHONE ENCOUNTER
The patient was advised of the message from [facility] CNP. PSA is down to 0.27. He voiced understanding.

## 2019-06-06 NOTE — TELEPHONE ENCOUNTER
Spoke with patient he does not take his bumex every day  States he thinks he takes his potassium twice a day  Advised patient our list states potassium is once a day and he should take his bumex everyday  He can only come to appointments after work and he does not get off until 330  Appointment sched at 330 on 6-24 he will come right after work  Please advise if you want a different day or how to schedule him

## 2019-06-26 ENCOUNTER — TELEPHONE (OUTPATIENT)
Dept: FAMILY MEDICINE CLINIC | Age: 54
End: 2019-06-26

## 2019-06-26 NOTE — TELEPHONE ENCOUNTER
Spoke with patient and attempted to schedule appt. Declines all appts offered.   He will go to [facility] for evaluation if s/s worsen

## 2019-06-26 NOTE — TELEPHONE ENCOUNTER
DOLV 6/4/19  Pt has a head and chest cold for amost 2 weeks. Headache, congestion, nasal and chest.  Productive cough ranging from clear to green. No fever, sore throat. or earache. He has to work the next 2 days from 7-3:30. Asking for antibiotic. Allergic to pcn, uses [address]   Call [phone] with response

## 2019-06-28 ENCOUNTER — PROCEDURE VISIT (OUTPATIENT)
Dept: CARDIOLOGY CLINIC | Age: 54
End: 2019-06-28
Payer: COMMERCIAL

## 2019-06-28 DIAGNOSIS — Z95.810 ICD (IMPLANTABLE CARDIOVERTER-DEFIBRILLATOR) IN PLACE: ICD-10-CM

## 2019-06-28 DIAGNOSIS — I50.43 ACUTE ON CHRONIC COMBINED SYSTOLIC AND DIASTOLIC CONGESTIVE HEART FAILURE (HCC): Primary | ICD-10-CM

## 2019-06-28 PROCEDURE — 93297 REM INTERROG DEV EVAL ICPMS: CPT | Performed by: NUCLEAR MEDICINE

## 2019-06-28 PROCEDURE — 93299 PR REM INTERROG ICPMS/SCRMS <30 D TECH REVIEW: CPT | Performed by: NUCLEAR MEDICINE

## 2019-07-02 ENCOUNTER — TELEPHONE (OUTPATIENT)
Dept: CARDIOLOGY CLINIC | Age: 54
End: 2019-07-02

## 2019-08-05 ENCOUNTER — PROCEDURE VISIT (OUTPATIENT)
Dept: CARDIOLOGY CLINIC | Age: 54
End: 2019-08-05
Payer: COMMERCIAL

## 2019-08-05 DIAGNOSIS — I50.22 CHRONIC SYSTOLIC CONGESTIVE HEART FAILURE (HCC): Primary | ICD-10-CM

## 2019-08-05 DIAGNOSIS — E11.22 CONTROLLED TYPE 2 DIABETES MELLITUS WITH CHRONIC KIDNEY DISEASE, WITHOUT LONG-TERM CURRENT USE OF INSULIN, UNSPECIFIED CKD STAGE (HCC): Primary | ICD-10-CM

## 2019-08-05 PROCEDURE — 93297 REM INTERROG DEV EVAL ICPMS: CPT | Performed by: INTERNAL MEDICINE

## 2019-08-05 PROCEDURE — 93299 PR REM INTERROG ICPMS/SCRMS <30 D TECH REVIEW: CPT | Performed by: INTERNAL MEDICINE

## 2019-08-05 RX ORDER — GLIPIZIDE 5 MG/1
5 TABLET ORAL 2 TIMES DAILY
Qty: 180 TABLET | Refills: 3 | Status: SHIPPED | OUTPATIENT
Start: 2019-08-05 | End: 2020-02-03

## 2019-08-12 ENCOUNTER — TELEPHONE (OUTPATIENT)
Dept: CARDIOLOGY CLINIC | Age: 54
End: 2019-08-12

## 2019-08-12 ENCOUNTER — TELEPHONE (OUTPATIENT)
Dept: FAMILY MEDICINE CLINIC | Age: 54
End: 2019-08-12

## 2019-08-12 DIAGNOSIS — I10 ESSENTIAL HYPERTENSION: ICD-10-CM

## 2019-08-12 RX ORDER — HYDRALAZINE HYDROCHLORIDE 50 MG/1
TABLET, FILM COATED ORAL
Qty: 135 TABLET | Refills: 5 | Status: ON HOLD | OUTPATIENT
Start: 2019-08-12 | End: 2019-09-26 | Stop reason: SDUPTHER

## 2019-08-12 NOTE — TELEPHONE ENCOUNTER
08/12/2019 Patient called office saying he got moved to different shift at work and they are requiring him to do heavy pulling and heavy pushing. Patient stated he had back surgery last year and still has tumor and is asking for a restriction letter  Saying he cannot do this type of job. Patient said he did it 4 hours last week and said his back was in severe pain. Please advise patient at [phone]. da

## 2019-08-19 RX ORDER — BUMETANIDE 2 MG/1
2 TABLET ORAL DAILY
Qty: 30 TABLET | Refills: 1 | Status: SHIPPED | OUTPATIENT
Start: 2019-08-19 | End: 2019-11-01 | Stop reason: SDUPTHER

## 2019-09-04 ENCOUNTER — OFFICE VISIT (OUTPATIENT)
Dept: CARDIOLOGY CLINIC | Age: 54
End: 2019-09-04
Payer: COMMERCIAL

## 2019-09-04 VITALS
BODY MASS INDEX: 35.55 KG/M2 | HEART RATE: 58 BPM | DIASTOLIC BLOOD PRESSURE: 90 MMHG | SYSTOLIC BLOOD PRESSURE: 184 MMHG | WEIGHT: 277 LBS | OXYGEN SATURATION: 96 % | HEIGHT: 74 IN

## 2019-09-04 DIAGNOSIS — Z72.0 TOBACCO ABUSE: ICD-10-CM

## 2019-09-04 DIAGNOSIS — I50.42 CHF (CONGESTIVE HEART FAILURE), NYHA CLASS II, CHRONIC, COMBINED (HCC): Primary | ICD-10-CM

## 2019-09-04 DIAGNOSIS — I10 ESSENTIAL HYPERTENSION: ICD-10-CM

## 2019-09-04 PROCEDURE — 99213 OFFICE O/P EST LOW 20 MIN: CPT | Performed by: NURSE PRACTITIONER

## 2019-09-04 RX ORDER — AMIODARONE HYDROCHLORIDE 200 MG/1
200 TABLET ORAL DAILY
COMMUNITY
Start: 2019-07-01 | End: 2020-01-09 | Stop reason: SDUPTHER

## 2019-09-04 ASSESSMENT — ENCOUNTER SYMPTOMS
SHORTNESS OF BREATH: 1
WHEEZING: 0
ABDOMINAL DISTENTION: 0
APNEA: 0
COUGH: 0
COLOR CHANGE: 0
CHEST TIGHTNESS: 0
ABDOMINAL PAIN: 0
NAUSEA: 0

## 2019-09-04 NOTE — PROGRESS NOTES
capsule TAKE 2 CAPSULES THREE TIMES A DAY FOR ATRIAL FIBRILLATION 540 capsule 1    isosorbide mononitrate (IMDUR) 60 MG extended release tablet Take 1 tablet by mouth daily 90 tablet 2    potassium chloride (KLOR-CON M) 20 MEQ extended release tablet Take 1 tablet by mouth daily 30 tablet 1    metoprolol tartrate (LOPRESSOR) 50 MG tablet TAKE 1 TABLET THREE TIMES A DAY FOR ATRIAL FIBRILLATION 270 tablet 1    TRADJENTA 5 MG tablet TAKE 1 TABLET DAILY 90 tablet 2    warfarin (COUMADIN) 5 MG tablet Take 1/2 tab Daily except Wednesday take 1 tab (Patient taking differently: Take 1/2 tab Daily except Wednesday take 1 tab. Dosed by [facility]) 30 tablet 0    acetaminophen 650 MG TABS Take 650 mg by mouth every 4 hours as needed 120 tablet 3    nitroGLYCERIN (NITROSTAT) 0.4 MG SL tablet Place 1 tablet under the tongue every 5 minutes as needed for Chest pain X 3 doses. If chest pain continues seek medical attention 25 tablet 3    glucose blood VI test strips (PHIL CONTOUR TEST) strip 1 each by In Vitro route daily 300 each 3     No current facility-administered medications for this visit. Allergies   Allergen Reactions    Pcn [Penicillins] Hives    Zoloft [Sertraline Hcl] Anxiety     Worsened anxiety       SUBJECTIVE:   Review of Systems   Constitutional: Negative for activity change, appetite change, fatigue and fever. HENT: Negative for congestion. Respiratory: Positive for shortness of breath (with exertion ). Negative for apnea, cough, chest tightness and wheezing. Cardiovascular: Positive for leg swelling. Negative for chest pain and palpitations. Gastrointestinal: Negative for abdominal distention, abdominal pain and nausea. Genitourinary: Negative for difficulty urinating and dysuria. Musculoskeletal: Negative for arthralgias and gait problem. Skin: Negative for color change. Neurological: Negative for dizziness, numbness and headaches.    Psychiatric/Behavioral: Negative for agitation, confusion and sleep disturbance. The patient is not nervous/anxious. Flat affect       OBJECTIVE:   Today's Vitals:  BP (!) 184/90   Pulse 58   Ht 6' 2\" (1.88 m)   Wt 277 lb (125.6 kg)   SpO2 96%   BMI 35.56 kg/m²     Physical Exam   Constitutional: He is oriented to person, place, and time. He appears well-developed and well-nourished. HENT:   Head: Normocephalic and atraumatic. Eyes: Pupils are equal, round, and reactive to light. Neck: Normal range of motion. No JVD present. Cardiovascular: Normal rate and normal heart sounds. No murmur heard. Pulmonary/Chest: Effort normal. No respiratory distress. He has no rales. Abdominal: Soft. He exhibits no distension. There is no tenderness. Musculoskeletal: He exhibits edema (2+ right LE, 1+ left LE). He exhibits no tenderness. Neurological: He is alert and oriented to person, place, and time. Skin: Skin is warm and dry. Psychiatric: He has a normal mood and affect. His behavior is normal.   Vitals reviewed. Wt Readings from Last 3 Encounters:   09/04/19 277 lb (125.6 kg)   06/04/19 269 lb 8 oz (122.2 kg)   03/29/19 276 lb (125.2 kg)     BP Readings from Last 3 Encounters:   09/04/19 (!) 184/90   06/04/19 134/62   03/29/19 128/84     Pulse Readings from Last 3 Encounters:   09/04/19 58   06/04/19 96   03/13/19 60     Body mass index is 35.56 kg/m². ECHO:   2/4/19   Summary   Left ventricle size is normal.   Normal left ventricular wall thickness.   Systolic function was moderately reduced.  There was moderate global   hypokinesis of the left ventricle.   Ejection fraction is visually estimated at 40-45%.   Impaired relaxation compatible with diastolic dysfunction.   The left atrium is Moderately dilated.   The right ventricular size was normal with normal systolic function and   wall thickness.   Pacer Wire visualized in right ventricle.   Mild mitral regurgitation is present.   Mild tricuspid regurgitation visualized.      Signature      ----------------------------------------------------------------   Electronically signed by Fermin Atkinson MD (Interpreting   [id number]) on 02/04/2019 at 04:01 PM    Results reviewed:  BNP:   Lab Results   Component Value Date    PROBNP 8256.0 (H) 02/03/2019     CBC:   Lab Results   Component Value Date    WBC 6.7 02/04/2019    RBC 3.17 02/04/2019    RBC 4.75 11/03/2011    HGB 13.2 06/05/2019    HCT 39.8 06/05/2019     02/04/2019     CMP:    Lab Results   Component Value Date     06/05/2019    K 4.9 06/05/2019    K 4.1 02/03/2019     06/05/2019    CO2 21 06/05/2019    BUN 21 06/05/2019    CREATININE 1.6 06/05/2019    LABGLOM 45 06/05/2019    GLUCOSE 78 06/05/2019    CALCIUM 9.0 06/05/2019     Hepatic Function Panel:    Lab Results   Component Value Date    ALKPHOS 50 12/14/2018    ALT 24 12/14/2018    AST 25 12/14/2018    PROT 5.9 12/14/2018    BILITOT 0.3 12/14/2018    BILIDIR <0.2 05/18/2018    LABALBU 3.5 12/14/2018     Magnesium:    Lab Results   Component Value Date    MG 2.1 02/04/2019     PT/INR:    Lab Results   Component Value Date    INR 1.69 02/04/2019     Lipids:    Lab Results   Component Value Date    TRIG 148 02/04/2019    HDL 32 02/04/2019    LDLCALC 119 02/04/2019       ASSESSMENT AND PLAN:   The patient's condition/symptoms are Stable      Diagnosis Orders   1. CHF (congestive heart failure), NYHA class II, chronic, combined ([address]     2. Essential hypertension     3. Tobacco abuse     -smoking cessation x 3-5 minutes. He is not interested in quitting at this time. Plan:  · BMP 6/5/19 reviewed. Cr stable 1.6 (basline 1.4-1.6). Double Bumex to 2 mg bid with an extra Potassium 20 mEq x 2 days d/t wt gain with LE edema. Continue Bumex 2 mg daily, Potassium 20 mEq daily, Metoprolol 50 mg bid, Hydralazine 75 mg tid, Imdur 60 mg daily, on Plavix, Coumadin, Amiodarone and Mexitil. HF Zones reviewed.   · Daily weights  · Fluid restriction of 2 Liters per day  · Limit sodium in diet to around 7570-7684 mg/day  · Monitor BP  · Activity as tolerated     Patient was instructed to call the [address] for changes in the following symptoms:   Weight gain of 3 pounds in 1 day or 5 pounds in 1 week  Increased shortness of breath  Shortness of breath while laying down  Cough  Chest pain  Swelling in feet, ankles or legs  Tenderness or bloating in the abdomen  Fatigue   Decreased appetite or nausea   Confusion      Return in about 4 months (around 1/4/2020). or sooner if needed     Patient given educational materials - see patient instructions. We discussed the importance of weighing oneself and recording daily. We also discussed the importance of a lowsodium diet, higher sodium foods to avoid and better low sodium food options. Discussed use, benefit, and side effects of prescribed medications. All patient questions answered. Patient verbalizes understanding of plan of care using teach back method, and is agreeable to the treatment plan.        Electronicallysigned by ISABELLA Fritz CNP on 9/4/2019 at 12:55 PM

## 2019-09-05 ENCOUNTER — PROCEDURE VISIT (OUTPATIENT)
Dept: CARDIOLOGY CLINIC | Age: 54
End: 2019-09-05
Payer: COMMERCIAL

## 2019-09-05 DIAGNOSIS — Z95.810 ICD (IMPLANTABLE CARDIOVERTER-DEFIBRILLATOR) IN PLACE: Primary | ICD-10-CM

## 2019-09-05 PROCEDURE — 93296 REM INTERROG EVL PM/IDS: CPT | Performed by: INTERNAL MEDICINE

## 2019-09-05 PROCEDURE — 93295 DEV INTERROG REMOTE 1/2/MLT: CPT | Performed by: INTERNAL MEDICINE

## 2019-09-05 NOTE — PROGRESS NOTES
medtronic dual icd   5.8 years on device    At imped 437  shock 54  P waves 1.3 RV 11.9  100% AS-VS   0 mode switches   optivol WNL

## 2019-09-13 RX ORDER — ISOSORBIDE MONONITRATE 60 MG/1
60 TABLET, EXTENDED RELEASE ORAL DAILY
Qty: 90 TABLET | Refills: 2 | Status: ON HOLD | OUTPATIENT
Start: 2019-09-13 | End: 2019-09-26 | Stop reason: SDUPTHER

## 2019-09-24 ENCOUNTER — HOSPITAL ENCOUNTER (INPATIENT)
Age: 54
LOS: 2 days | Discharge: HOME | DRG: 291 | End: 2019-09-26
Attending: INTERNAL MEDICINE | Admitting: INTERNAL MEDICINE
Payer: COMMERCIAL

## 2019-09-24 ENCOUNTER — APPOINTMENT (OUTPATIENT)
Dept: GENERAL RADIOLOGY | Age: 54
DRG: 291 | End: 2019-09-24
Payer: COMMERCIAL

## 2019-09-24 DIAGNOSIS — I10 ESSENTIAL HYPERTENSION: ICD-10-CM

## 2019-09-24 DIAGNOSIS — I50.43 ACUTE ON CHRONIC COMBINED SYSTOLIC AND DIASTOLIC HF (HEART FAILURE) (HCC): Primary | ICD-10-CM

## 2019-09-24 LAB
ALBUMIN SERPL-MCNC: 3.6 G/DL (ref 3.5–5.1)
ALP BLD-CCNC: 85 U/L (ref 38–126)
ALT SERPL-CCNC: 39 U/L (ref 11–66)
ANION GAP SERPL CALCULATED.3IONS-SCNC: 14 MEQ/L (ref 8–16)
AST SERPL-CCNC: 41 U/L (ref 5–40)
BACTERIA: ABNORMAL /HPF
BASOPHILS # BLD: 1 %
BASOPHILS ABSOLUTE: 0.1 THOU/MM3 (ref 0–0.1)
BILIRUB SERPL-MCNC: 0.3 MG/DL (ref 0.3–1.2)
BILIRUBIN URINE: NEGATIVE
BLOOD, URINE: NEGATIVE
BUN BLDV-MCNC: 28 MG/DL (ref 7–22)
CALCIUM SERPL-MCNC: 8.7 MG/DL (ref 8.5–10.5)
CASTS 2: ABNORMAL /LPF
CASTS UA: ABNORMAL /LPF
CHARACTER, URINE: CLEAR
CHLORIDE BLD-SCNC: 111 MEQ/L (ref 98–111)
CO2: 20 MEQ/L (ref 23–33)
COLOR: YELLOW
CREAT SERPL-MCNC: 1.8 MG/DL (ref 0.4–1.2)
CRYSTALS, UA: ABNORMAL
EKG ATRIAL RATE: 64 BPM
EKG P AXIS: 55 DEGREES
EKG P-R INTERVAL: 200 MS
EKG Q-T INTERVAL: 430 MS
EKG QRS DURATION: 94 MS
EKG QTC CALCULATION (BAZETT): 443 MS
EKG R AXIS: 5 DEGREES
EKG T AXIS: 106 DEGREES
EKG VENTRICULAR RATE: 64 BPM
EOSINOPHIL # BLD: 2.4 %
EOSINOPHILS ABSOLUTE: 0.2 THOU/MM3 (ref 0–0.4)
EPITHELIAL CELLS, UA: ABNORMAL /HPF
ERYTHROCYTE [DISTWIDTH] IN BLOOD BY AUTOMATED COUNT: 13.5 % (ref 11.5–14.5)
ERYTHROCYTE [DISTWIDTH] IN BLOOD BY AUTOMATED COUNT: 50.3 FL (ref 35–45)
GFR SERPL CREATININE-BSD FRML MDRD: 39 ML/MIN/1.73M2
GLUCOSE BLD-MCNC: 63 MG/DL (ref 70–108)
GLUCOSE URINE: NEGATIVE MG/DL
HCT VFR BLD CALC: 35.8 % (ref 42–52)
HEMOGLOBIN: 11.6 GM/DL (ref 14–18)
IMMATURE GRANS (ABS): 0.07 THOU/MM3 (ref 0–0.07)
IMMATURE GRANULOCYTES: 1 %
INR BLD: 1.5 (ref 0.85–1.13)
KETONES, URINE: NEGATIVE
LEUKOCYTE ESTERASE, URINE: NEGATIVE
LYMPHOCYTES # BLD: 9.2 %
LYMPHOCYTES ABSOLUTE: 0.8 THOU/MM3 (ref 1–4.8)
MCH RBC QN AUTO: 33 PG (ref 26–33)
MCHC RBC AUTO-ENTMCNC: 32.4 GM/DL (ref 32.2–35.5)
MCV RBC AUTO: 101.7 FL (ref 80–94)
MISCELLANEOUS 2: ABNORMAL
MONOCYTES # BLD: 7.9 %
MONOCYTES ABSOLUTE: 0.7 THOU/MM3 (ref 0.4–1.3)
NITRITE, URINE: NEGATIVE
NUCLEATED RED BLOOD CELLS: 0 /100 WBC
OSMOLALITY CALCULATION: 292.2 MOSMOL/KG (ref 275–300)
PH UA: 6.5 (ref 5–9)
PLATELET # BLD: 174 THOU/MM3 (ref 130–400)
PMV BLD AUTO: 10.2 FL (ref 9.4–12.4)
POTASSIUM SERPL-SCNC: 4.1 MEQ/L (ref 3.5–5.2)
PRO-BNP: 5076 PG/ML (ref 0–900)
PROTEIN UA: 100
RBC # BLD: 3.52 MILL/MM3 (ref 4.7–6.1)
RBC URINE: ABNORMAL /HPF
RENAL EPITHELIAL, UA: ABNORMAL
SEG NEUTROPHILS: 78.7 %
SEGMENTED NEUTROPHILS ABSOLUTE COUNT: 7.2 THOU/MM3 (ref 1.8–7.7)
SODIUM BLD-SCNC: 145 MEQ/L (ref 135–145)
SPECIFIC GRAVITY, URINE: 1.01 (ref 1–1.03)
TOTAL PROTEIN: 6.3 G/DL (ref 6.1–8)
TROPONIN T: 0.05 NG/ML
TROPONIN T: 0.06 NG/ML
TROPONIN T: 0.06 NG/ML
UROBILINOGEN, URINE: 0.2 EU/DL (ref 0–1)
WBC # BLD: 9.1 THOU/MM3 (ref 4.8–10.8)
WBC UA: ABNORMAL /HPF
YEAST: ABNORMAL

## 2019-09-24 PROCEDURE — 6370000000 HC RX 637 (ALT 250 FOR IP): Performed by: PHYSICIAN ASSISTANT

## 2019-09-24 PROCEDURE — 85025 COMPLETE CBC W/AUTO DIFF WBC: CPT

## 2019-09-24 PROCEDURE — 6360000002 HC RX W HCPCS: Performed by: EMERGENCY MEDICINE

## 2019-09-24 PROCEDURE — 93005 ELECTROCARDIOGRAM TRACING: CPT | Performed by: EMERGENCY MEDICINE

## 2019-09-24 PROCEDURE — 96374 THER/PROPH/DIAG INJ IV PUSH: CPT

## 2019-09-24 PROCEDURE — 83880 ASSAY OF NATRIURETIC PEPTIDE: CPT

## 2019-09-24 PROCEDURE — 99285 EMERGENCY DEPT VISIT HI MDM: CPT

## 2019-09-24 PROCEDURE — 85610 PROTHROMBIN TIME: CPT

## 2019-09-24 PROCEDURE — 6360000002 HC RX W HCPCS

## 2019-09-24 PROCEDURE — 2140000000 HC CCU INTERMEDIATE R&B

## 2019-09-24 PROCEDURE — 36415 COLL VENOUS BLD VENIPUNCTURE: CPT

## 2019-09-24 PROCEDURE — 71046 X-RAY EXAM CHEST 2 VIEWS: CPT

## 2019-09-24 PROCEDURE — 2709999900 HC NON-CHARGEABLE SUPPLY

## 2019-09-24 PROCEDURE — 99223 1ST HOSP IP/OBS HIGH 75: CPT | Performed by: PHYSICIAN ASSISTANT

## 2019-09-24 PROCEDURE — 6370000000 HC RX 637 (ALT 250 FOR IP): Performed by: INTERNAL MEDICINE

## 2019-09-24 PROCEDURE — 84484 ASSAY OF TROPONIN QUANT: CPT

## 2019-09-24 PROCEDURE — 6360000002 HC RX W HCPCS: Performed by: PHYSICIAN ASSISTANT

## 2019-09-24 PROCEDURE — 80053 COMPREHEN METABOLIC PANEL: CPT

## 2019-09-24 PROCEDURE — 2580000003 HC RX 258: Performed by: PHYSICIAN ASSISTANT

## 2019-09-24 PROCEDURE — 96375 TX/PRO/DX INJ NEW DRUG ADDON: CPT

## 2019-09-24 PROCEDURE — 93010 ELECTROCARDIOGRAM REPORT: CPT | Performed by: INTERNAL MEDICINE

## 2019-09-24 PROCEDURE — 81001 URINALYSIS AUTO W/SCOPE: CPT

## 2019-09-24 RX ORDER — SODIUM CHLORIDE 0.9 % (FLUSH) 0.9 %
10 SYRINGE (ML) INJECTION PRN
Status: DISCONTINUED | OUTPATIENT
Start: 2019-09-24 | End: 2019-09-26 | Stop reason: HOSPADM

## 2019-09-24 RX ORDER — FUROSEMIDE 10 MG/ML
40 INJECTION INTRAMUSCULAR; INTRAVENOUS 2 TIMES DAILY
Status: DISCONTINUED | OUTPATIENT
Start: 2019-09-24 | End: 2019-09-25

## 2019-09-24 RX ORDER — METOPROLOL TARTRATE 50 MG/1
50 TABLET, FILM COATED ORAL 2 TIMES DAILY
Status: DISCONTINUED | OUTPATIENT
Start: 2019-09-24 | End: 2019-09-26

## 2019-09-24 RX ORDER — BUMETANIDE 0.25 MG/ML
2 INJECTION, SOLUTION INTRAMUSCULAR; INTRAVENOUS 2 TIMES DAILY
Status: DISCONTINUED | OUTPATIENT
Start: 2019-09-24 | End: 2019-09-24 | Stop reason: RX

## 2019-09-24 RX ORDER — ONDANSETRON 2 MG/ML
4 INJECTION INTRAMUSCULAR; INTRAVENOUS EVERY 6 HOURS PRN
Status: DISCONTINUED | OUTPATIENT
Start: 2019-09-24 | End: 2019-09-26 | Stop reason: HOSPADM

## 2019-09-24 RX ORDER — HYDRALAZINE HYDROCHLORIDE 20 MG/ML
INJECTION INTRAMUSCULAR; INTRAVENOUS
Status: COMPLETED
Start: 2019-09-24 | End: 2019-09-24

## 2019-09-24 RX ORDER — ACETAMINOPHEN 325 MG/1
650 TABLET ORAL EVERY 4 HOURS PRN
Status: DISCONTINUED | OUTPATIENT
Start: 2019-09-24 | End: 2019-09-26 | Stop reason: HOSPADM

## 2019-09-24 RX ORDER — LISINOPRIL 5 MG/1
5 TABLET ORAL DAILY
Status: DISCONTINUED | OUTPATIENT
Start: 2019-09-24 | End: 2019-09-24

## 2019-09-24 RX ORDER — HYDRALAZINE HYDROCHLORIDE 20 MG/ML
10 INJECTION INTRAMUSCULAR; INTRAVENOUS EVERY 6 HOURS PRN
Status: DISCONTINUED | OUTPATIENT
Start: 2019-09-24 | End: 2019-09-26 | Stop reason: HOSPADM

## 2019-09-24 RX ORDER — SODIUM CHLORIDE 0.9 % (FLUSH) 0.9 %
10 SYRINGE (ML) INJECTION EVERY 12 HOURS SCHEDULED
Status: DISCONTINUED | OUTPATIENT
Start: 2019-09-24 | End: 2019-09-26 | Stop reason: HOSPADM

## 2019-09-24 RX ORDER — HYDRALAZINE HYDROCHLORIDE 20 MG/ML
10 INJECTION INTRAMUSCULAR; INTRAVENOUS ONCE
Status: COMPLETED | OUTPATIENT
Start: 2019-09-24 | End: 2019-09-24

## 2019-09-24 RX ORDER — MEXILETINE HYDROCHLORIDE 150 MG/1
300 CAPSULE ORAL EVERY 8 HOURS SCHEDULED
Status: DISCONTINUED | OUTPATIENT
Start: 2019-09-24 | End: 2019-09-26 | Stop reason: HOSPADM

## 2019-09-24 RX ORDER — HYDRALAZINE HYDROCHLORIDE 50 MG/1
50 TABLET, FILM COATED ORAL EVERY 8 HOURS SCHEDULED
Status: DISCONTINUED | OUTPATIENT
Start: 2019-09-24 | End: 2019-09-24

## 2019-09-24 RX ORDER — MEXILETINE HYDROCHLORIDE 150 MG/1
150 CAPSULE ORAL EVERY 8 HOURS SCHEDULED
Status: DISCONTINUED | OUTPATIENT
Start: 2019-09-24 | End: 2019-09-24

## 2019-09-24 RX ORDER — BUMETANIDE 1 MG/1
2 TABLET ORAL DAILY
Status: DISCONTINUED | OUTPATIENT
Start: 2019-09-24 | End: 2019-09-24

## 2019-09-24 RX ORDER — WARFARIN SODIUM 2.5 MG/1
2.5 TABLET ORAL DAILY
Status: DISCONTINUED | OUTPATIENT
Start: 2019-09-24 | End: 2019-09-24

## 2019-09-24 RX ORDER — FUROSEMIDE 10 MG/ML
80 INJECTION INTRAMUSCULAR; INTRAVENOUS 2 TIMES DAILY
Status: DISCONTINUED | OUTPATIENT
Start: 2019-09-24 | End: 2019-09-24

## 2019-09-24 RX ORDER — POTASSIUM CHLORIDE 20 MEQ/1
20 TABLET, EXTENDED RELEASE ORAL DAILY
Status: DISCONTINUED | OUTPATIENT
Start: 2019-09-24 | End: 2019-09-26 | Stop reason: HOSPADM

## 2019-09-24 RX ORDER — LISINOPRIL 10 MG/1
10 TABLET ORAL DAILY
Status: DISCONTINUED | OUTPATIENT
Start: 2019-09-24 | End: 2019-09-26 | Stop reason: HOSPADM

## 2019-09-24 RX ORDER — HEPARIN SODIUM 5000 [USP'U]/ML
5000 INJECTION, SOLUTION INTRAVENOUS; SUBCUTANEOUS EVERY 8 HOURS SCHEDULED
Status: DISCONTINUED | OUTPATIENT
Start: 2019-09-24 | End: 2019-09-24

## 2019-09-24 RX ORDER — WARFARIN SODIUM 5 MG/1
5 TABLET ORAL ONCE
Status: COMPLETED | OUTPATIENT
Start: 2019-09-24 | End: 2019-09-24

## 2019-09-24 RX ORDER — CLOPIDOGREL BISULFATE 75 MG/1
75 TABLET ORAL DAILY
Status: DISCONTINUED | OUTPATIENT
Start: 2019-09-24 | End: 2019-09-26 | Stop reason: HOSPADM

## 2019-09-24 RX ORDER — ISOSORBIDE MONONITRATE 60 MG/1
60 TABLET, EXTENDED RELEASE ORAL DAILY
Status: DISCONTINUED | OUTPATIENT
Start: 2019-09-24 | End: 2019-09-26

## 2019-09-24 RX ORDER — AMIODARONE HYDROCHLORIDE 200 MG/1
200 TABLET ORAL DAILY
Status: DISCONTINUED | OUTPATIENT
Start: 2019-09-24 | End: 2019-09-26 | Stop reason: HOSPADM

## 2019-09-24 RX ORDER — GLIPIZIDE 5 MG/1
5 TABLET ORAL 2 TIMES DAILY
Status: DISCONTINUED | OUTPATIENT
Start: 2019-09-24 | End: 2019-09-24

## 2019-09-24 RX ORDER — FUROSEMIDE 10 MG/ML
40 INJECTION INTRAMUSCULAR; INTRAVENOUS ONCE
Status: COMPLETED | OUTPATIENT
Start: 2019-09-24 | End: 2019-09-24

## 2019-09-24 RX ORDER — WARFARIN SODIUM 5 MG/1
TABLET ORAL DAILY
COMMUNITY
End: 2019-11-08 | Stop reason: SDUPTHER

## 2019-09-24 RX ORDER — NICOTINE 21 MG/24HR
1 PATCH, TRANSDERMAL 24 HOURS TRANSDERMAL DAILY
Status: DISCONTINUED | OUTPATIENT
Start: 2019-09-24 | End: 2019-09-26 | Stop reason: HOSPADM

## 2019-09-24 RX ADMIN — FUROSEMIDE 40 MG: 10 INJECTION, SOLUTION INTRAMUSCULAR; INTRAVENOUS at 20:04

## 2019-09-24 RX ADMIN — HYDRALAZINE HYDROCHLORIDE 75 MG: 50 TABLET, FILM COATED ORAL at 14:34

## 2019-09-24 RX ADMIN — METOPROLOL TARTRATE 50 MG: 50 TABLET, FILM COATED ORAL at 20:02

## 2019-09-24 RX ADMIN — LINAGLIPTIN 5 MG: 5 TABLET, FILM COATED ORAL at 11:18

## 2019-09-24 RX ADMIN — MEXILETINE HYDROCHLORIDE 300 MG: 150 CAPSULE ORAL at 14:35

## 2019-09-24 RX ADMIN — FUROSEMIDE 80 MG: 10 INJECTION, SOLUTION INTRAMUSCULAR; INTRAVENOUS at 12:30

## 2019-09-24 RX ADMIN — LISINOPRIL 10 MG: 10 TABLET ORAL at 11:18

## 2019-09-24 RX ADMIN — HYDRALAZINE HYDROCHLORIDE 10 MG: 20 INJECTION INTRAMUSCULAR; INTRAVENOUS at 08:44

## 2019-09-24 RX ADMIN — CLOPIDOGREL BISULFATE 75 MG: 75 TABLET ORAL at 11:18

## 2019-09-24 RX ADMIN — ISOSORBIDE MONONITRATE 60 MG: 60 TABLET ORAL at 11:18

## 2019-09-24 RX ADMIN — HYDRALAZINE HYDROCHLORIDE 75 MG: 50 TABLET, FILM COATED ORAL at 21:57

## 2019-09-24 RX ADMIN — MEXILETINE HYDROCHLORIDE 300 MG: 150 CAPSULE ORAL at 21:56

## 2019-09-24 RX ADMIN — FUROSEMIDE 40 MG: 10 INJECTION, SOLUTION INTRAMUSCULAR; INTRAVENOUS at 07:55

## 2019-09-24 RX ADMIN — POTASSIUM CHLORIDE 20 MEQ: 1500 TABLET, EXTENDED RELEASE ORAL at 11:18

## 2019-09-24 RX ADMIN — Medication 10 ML: at 20:01

## 2019-09-24 RX ADMIN — WARFARIN SODIUM 5 MG: 5 TABLET ORAL at 17:59

## 2019-09-24 RX ADMIN — Medication 10 ML: at 11:19

## 2019-09-24 ASSESSMENT — ENCOUNTER SYMPTOMS
CHEST TIGHTNESS: 0
RHINORRHEA: 0
PHOTOPHOBIA: 0
ABDOMINAL DISTENTION: 0
ABDOMINAL PAIN: 0
VOMITING: 0
EYE PAIN: 0
SINUS PAIN: 0
CHOKING: 0
COUGH: 0
NAUSEA: 0
ABDOMINAL DISTENTION: 1
WHEEZING: 0
COLOR CHANGE: 0
BACK PAIN: 1
DIARRHEA: 0
SHORTNESS OF BREATH: 1

## 2019-09-24 ASSESSMENT — PAIN DESCRIPTION - PAIN TYPE: TYPE: ACUTE PAIN

## 2019-09-24 ASSESSMENT — PAIN DESCRIPTION - PROGRESSION: CLINICAL_PROGRESSION: GRADUALLY WORSENING

## 2019-09-24 ASSESSMENT — PAIN DESCRIPTION - DESCRIPTORS: DESCRIPTORS: ACHING

## 2019-09-24 ASSESSMENT — PAIN DESCRIPTION - FREQUENCY: FREQUENCY: INTERMITTENT

## 2019-09-24 ASSESSMENT — PAIN DESCRIPTION - LOCATION: LOCATION: BACK

## 2019-09-24 ASSESSMENT — PAIN DESCRIPTION - ONSET: ONSET: ON-GOING

## 2019-09-24 ASSESSMENT — PAIN SCALES - GENERAL
PAINLEVEL_OUTOF10: 0
PAINLEVEL_OUTOF10: 8

## 2019-09-24 ASSESSMENT — PAIN DESCRIPTION - ORIENTATION: ORIENTATION: LEFT;RIGHT;LOWER

## 2019-09-24 NOTE — H&P
distances, starting a few weeks ago. He states that he becomes SOB when walking from the parking lot into work. He admits to leg swelling, that has improved by wearing compression stockings at work. Pt admits to orthopnea, stating he sleeps with a few pillows and sleeps better sitting up. He states he has been feeling bloated and has noticed weight gain. Pt admits to having chest pain that is achy in nature randomly, with no alleviating or aggravating factors. Pt denies current chest pain. Denies fever, cough, dizziness, WINKLER. Sees Dr. Javad Velasquez (Cardiologist) outpatient. Pt states that he has been taking his medication everyday as prescribed. ROS: Review of Systems   Constitutional: Positive for fatigue and unexpected weight change (gain). Negative for fever. HENT: Negative for congestion, rhinorrhea and sinus pain. Eyes: Negative for photophobia, pain and visual disturbance. Respiratory: Positive for shortness of breath. Negative for cough and wheezing. Cardiovascular: Positive for chest pain and leg swelling. Negative for palpitations. Gastrointestinal: Positive for abdominal distention. Negative for abdominal pain, diarrhea, nausea and vomiting. Endocrine: Negative for cold intolerance, heat intolerance and polydipsia. Genitourinary: Negative for decreased urine volume, difficulty urinating, dysuria and urgency. Musculoskeletal: Positive for back pain. Negative for gait problem and joint swelling. Skin: Negative for color change, pallor and rash. Neurological: Positive for weakness. Negative for dizziness, light-headedness and headaches. Psychiatric/Behavioral: Negative for agitation, confusion and suicidal ideas. PMH:  Per HPI    SHX:  Current everyday smoker, 1ppd, 32 pack year hx. States occasional ETOH use. Denies drug use. FHX: Diabetes, HTN, stroke, heart disease     Allergies:    Allergies   Allergen Reactions    Pcn [Penicillins] Hives    Zoloft [Sertraline Hcl]

## 2019-09-24 NOTE — ED PROVIDER NOTES
pain X 3 doses. If chest pain continues seek medical attention    POTASSIUM CHLORIDE (KLOR-CON M) 20 MEQ EXTENDED RELEASE TABLET    Take 1 tablet by mouth daily    TRADJENTA 5 MG TABLET    TAKE 1 TABLET DAILY    WARFARIN (COUMADIN) 5 MG TABLET    Take 1/2 tab Daily except Wednesday take 1 tab       ALLERGIES     is allergic to pcn [penicillins] and zoloft [sertraline hcl]. FAMILY HISTORY     He indicated that his mother is . He indicated that his father is . He indicated that his sister is alive. He indicated that the status of his maternal grandmother is unknown. He indicated that the status of his maternal grandfather is unknown. He indicated that the status of his paternal grandfather is unknown.   family history includes Diabetes in his father, maternal grandfather, maternal grandmother, and mother; Heart Disease in his father, paternal grandfather, and sister; High Blood Pressure in his father and mother; Stroke in his mother. SOCIAL HISTORY      reports that he has been smoking cigarettes. He started smoking about 39 years ago. He has a 32.00 pack-year smoking history. He has quit using smokeless tobacco. He reports that he drinks alcohol. He reports that he does not use drugs. PHYSICAL EXAM     INITIAL VITALS:  weight is 277 lb (125.6 kg). His temperature is 97.8 °F (36.6 °C). His blood pressure is 197/97 (abnormal) and his pulse is 59. His respiration is 17 and oxygen saturation is 95%. Physical Exam   Constitutional: He is oriented to person, place, and time. He appears well-developed and well-nourished. No distress. HENT:   Head: Normocephalic. Eyes: Pupils are equal, round, and reactive to light. Neck: Normal range of motion. Cardiovascular: Normal rate, regular rhythm and normal heart sounds. Pulmonary/Chest: Effort normal. No respiratory distress. He has decreased breath sounds. Abdominal: Soft. Bowel sounds are normal.   Musculoskeletal: Normal range of motion. Right lower leg: He exhibits edema (2+). Left lower leg: He exhibits edema (2+). Neurological: He is alert and oriented to person, place, and time. Skin: Skin is warm and dry. Capillary refill takes less than 2 seconds. DIFFERENTIAL DIAGNOSIS:   ACS, CHF, BABAK, pneumonia    DIAGNOSTIC RESULTS     EKG: All EKG's are interpreted by the Emergency Department Physician who either signs or Co-signs this chart in the absence of a cardiologist.    Normal sinus rhythm ventricular rate 64 bpm.  There are inverted T waves in the lateral leads present on EKG February 3, 2019. KS interval 200, QRS duration 94, corrected  ms. RADIOLOGY: non-plainfilm images(s) such as CT, Ultrasound and MRI are read by the radiologist.    XR CHEST STANDARD (2 VW)   Final Result   1. Cardiomegaly with postoperative sternotomy changes in visualized pacemaker. 2. Mild bibasilar atelectasis and or developing infiltrates not excluded. **This report has been created using voice recognition software. It may contain minor errors which are inherent in voice recognition technology. **      Final report electronically signed by Dr. Simba Turner on 9/24/2019 7:03 AM          LABS:     Labs Reviewed   CBC WITH AUTO DIFFERENTIAL - Abnormal; Notable for the following components:       Result Value    RBC 3.52 (*)     Hemoglobin 11.6 (*)     Hematocrit 35.8 (*)     .7 (*)     RDW-SD 50.3 (*)     Lymphocytes Absolute 0.8 (*)     All other components within normal limits   COMPREHENSIVE METABOLIC PANEL - Abnormal; Notable for the following components:    Glucose 63 (*)     CREATININE 1.8 (*)     BUN 28 (*)     CO2 20 (*)     AST 41 (*)     All other components within normal limits   TROPONIN - Abnormal; Notable for the following components:    Troponin T 0.056 (*)     All other components within normal limits   BRAIN NATRIURETIC PEPTIDE - Abnormal; Notable for the following components:    Pro-BNP 5076.0 (*)

## 2019-09-24 NOTE — PROGRESS NOTES
Clinical Pharmacy Note    Alden Mueller is a 47 y.o. male for whom pharmacy has been asked to manage warfarin therapy. Reason for Admission: CHF     Consulting Physician: Dr. 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    Anxiety disorder     Arthritis     Atrial fibrillation ([address]     CAD (coronary artery disease) 3/25/2014    s/p CABG in may 2014    Cardiomyopathy [facility])     Chronic kidney disease     Depression     Diabetes mellitus ([address]     Drug abuse ([address]     hx of cacaine abuse, stopped abusing drugs in 2012    GERD (gastroesophageal reflux disease)     H/O cardiac catheterization 4/30/2014    Hyperlipidemia     Hypertension     Lumbar spine tumor     [employer] dual icd      Neuromuscular disorder ([address]     Other disorders of kidney and ureter in diseases classified elsewhere     Paroxysmal atrial fibrillation ([address] 7/22/2014    V tach ([address]     V-tach ([facility]     s/p ablation in dec 2014     Recent Labs     09/24/19  0610   INR 1.50*     Recent Labs     09/24/19  0610   HGB 11.6*   HCT 35.8*        Current warfarin drug-drug interactions: amiodarone (Hm), clopidogrel (Hm)    Date INR Warfarin Dose   9/24/2019 1.50 5 mg                                   Daily PT/INR until stable within therapeutic range. Thank you for the consult. Lesly DOWLING  [facility], PharmD  PGY-1 Pharmacy Resident  9/24/2019  3:10 PM

## 2019-09-25 ENCOUNTER — APPOINTMENT (OUTPATIENT)
Dept: NON INVASIVE DIAGNOSTICS | Age: 54
DRG: 291 | End: 2019-09-25
Payer: COMMERCIAL

## 2019-09-25 LAB
ANION GAP SERPL CALCULATED.3IONS-SCNC: 12 MEQ/L (ref 8–16)
BASOPHILS # BLD: 1.2 %
BASOPHILS ABSOLUTE: 0.1 THOU/MM3 (ref 0–0.1)
BUN BLDV-MCNC: 29 MG/DL (ref 7–22)
CALCIUM SERPL-MCNC: 8.6 MG/DL (ref 8.5–10.5)
CHLORIDE BLD-SCNC: 104 MEQ/L (ref 98–111)
CHOLESTEROL, TOTAL: 172 MG/DL (ref 100–199)
CO2: 26 MEQ/L (ref 23–33)
CREAT SERPL-MCNC: 2 MG/DL (ref 0.4–1.2)
EOSINOPHIL # BLD: 3.9 %
EOSINOPHILS ABSOLUTE: 0.3 THOU/MM3 (ref 0–0.4)
ERYTHROCYTE [DISTWIDTH] IN BLOOD BY AUTOMATED COUNT: 13.4 % (ref 11.5–14.5)
ERYTHROCYTE [DISTWIDTH] IN BLOOD BY AUTOMATED COUNT: 49.2 FL (ref 35–45)
GFR SERPL CREATININE-BSD FRML MDRD: 35 ML/MIN/1.73M2
GLUCOSE BLD-MCNC: 87 MG/DL (ref 70–108)
HCT VFR BLD CALC: 36.9 % (ref 42–52)
HDLC SERPL-MCNC: 39 MG/DL
HEMOGLOBIN: 11.9 GM/DL (ref 14–18)
IMMATURE GRANS (ABS): 0.03 THOU/MM3 (ref 0–0.07)
IMMATURE GRANULOCYTES: 0.4 %
INR BLD: 1.24 (ref 0.85–1.13)
LDL CHOLESTEROL CALCULATED: 103 MG/DL
LYMPHOCYTES # BLD: 13 %
LYMPHOCYTES ABSOLUTE: 1 THOU/MM3 (ref 1–4.8)
MAGNESIUM: 2.2 MG/DL (ref 1.6–2.4)
MCH RBC QN AUTO: 32.6 PG (ref 26–33)
MCHC RBC AUTO-ENTMCNC: 32.2 GM/DL (ref 32.2–35.5)
MCV RBC AUTO: 101.1 FL (ref 80–94)
MONOCYTES # BLD: 8.9 %
MONOCYTES ABSOLUTE: 0.7 THOU/MM3 (ref 0.4–1.3)
NUCLEATED RED BLOOD CELLS: 0 /100 WBC
PLATELET # BLD: 149 THOU/MM3 (ref 130–400)
PMV BLD AUTO: 9.6 FL (ref 9.4–12.4)
POTASSIUM SERPL-SCNC: 3.6 MEQ/L (ref 3.5–5.2)
RBC # BLD: 3.65 MILL/MM3 (ref 4.7–6.1)
SEG NEUTROPHILS: 72.6 %
SEGMENTED NEUTROPHILS ABSOLUTE COUNT: 5.5 THOU/MM3 (ref 1.8–7.7)
SODIUM BLD-SCNC: 142 MEQ/L (ref 135–145)
TRIGL SERPL-MCNC: 151 MG/DL (ref 0–199)
WBC # BLD: 7.6 THOU/MM3 (ref 4.8–10.8)

## 2019-09-25 PROCEDURE — 99254 IP/OBS CNSLTJ NEW/EST MOD 60: CPT | Performed by: NUCLEAR MEDICINE

## 2019-09-25 PROCEDURE — 93307 TTE W/O DOPPLER COMPLETE: CPT

## 2019-09-25 PROCEDURE — 80061 LIPID PANEL: CPT

## 2019-09-25 PROCEDURE — A9500 TC99M SESTAMIBI: HCPCS | Performed by: HOSPITALIST

## 2019-09-25 PROCEDURE — 85025 COMPLETE CBC W/AUTO DIFF WBC: CPT

## 2019-09-25 PROCEDURE — 93017 CV STRESS TEST TRACING ONLY: CPT

## 2019-09-25 PROCEDURE — 99233 SBSQ HOSP IP/OBS HIGH 50: CPT | Performed by: HOSPITALIST

## 2019-09-25 PROCEDURE — 6370000000 HC RX 637 (ALT 250 FOR IP): Performed by: HOSPITALIST

## 2019-09-25 PROCEDURE — 80048 BASIC METABOLIC PNL TOTAL CA: CPT

## 2019-09-25 PROCEDURE — 6360000002 HC RX W HCPCS

## 2019-09-25 PROCEDURE — 3430000000 HC RX DIAGNOSTIC RADIOPHARMACEUTICAL: Performed by: HOSPITALIST

## 2019-09-25 PROCEDURE — 83735 ASSAY OF MAGNESIUM: CPT

## 2019-09-25 PROCEDURE — 2580000003 HC RX 258: Performed by: PHYSICIAN ASSISTANT

## 2019-09-25 PROCEDURE — 85610 PROTHROMBIN TIME: CPT

## 2019-09-25 PROCEDURE — 6360000002 HC RX W HCPCS: Performed by: PHYSICIAN ASSISTANT

## 2019-09-25 PROCEDURE — 6370000000 HC RX 637 (ALT 250 FOR IP): Performed by: PHYSICIAN ASSISTANT

## 2019-09-25 PROCEDURE — 36415 COLL VENOUS BLD VENIPUNCTURE: CPT

## 2019-09-25 PROCEDURE — 78452 HT MUSCLE IMAGE SPECT MULT: CPT

## 2019-09-25 PROCEDURE — 2140000000 HC CCU INTERMEDIATE R&B

## 2019-09-25 RX ORDER — WARFARIN SODIUM 5 MG/1
5 TABLET ORAL ONCE
Status: DISCONTINUED | OUTPATIENT
Start: 2019-09-25 | End: 2019-09-26

## 2019-09-25 RX ORDER — BUMETANIDE 1 MG/1
2 TABLET ORAL DAILY
Status: DISCONTINUED | OUTPATIENT
Start: 2019-09-25 | End: 2019-09-26 | Stop reason: HOSPADM

## 2019-09-25 RX ORDER — HYDRALAZINE HYDROCHLORIDE 50 MG/1
100 TABLET, FILM COATED ORAL EVERY 8 HOURS SCHEDULED
Status: DISCONTINUED | OUTPATIENT
Start: 2019-09-25 | End: 2019-09-26 | Stop reason: HOSPADM

## 2019-09-25 RX ADMIN — Medication 29.9 MILLICURIE: at 13:40

## 2019-09-25 RX ADMIN — AMIODARONE HYDROCHLORIDE 200 MG: 200 TABLET ORAL at 09:28

## 2019-09-25 RX ADMIN — Medication 10 ML: at 09:30

## 2019-09-25 RX ADMIN — ISOSORBIDE MONONITRATE 60 MG: 60 TABLET ORAL at 09:28

## 2019-09-25 RX ADMIN — BUMETANIDE 2 MG: 1 TABLET ORAL at 17:14

## 2019-09-25 RX ADMIN — POTASSIUM CHLORIDE 20 MEQ: 1500 TABLET, EXTENDED RELEASE ORAL at 09:29

## 2019-09-25 RX ADMIN — MEXILETINE HYDROCHLORIDE 300 MG: 150 CAPSULE ORAL at 23:07

## 2019-09-25 RX ADMIN — HYDRALAZINE HYDROCHLORIDE 100 MG: 50 TABLET, FILM COATED ORAL at 14:42

## 2019-09-25 RX ADMIN — CLOPIDOGREL BISULFATE 75 MG: 75 TABLET ORAL at 09:28

## 2019-09-25 RX ADMIN — Medication 8.6 MILLICURIE: at 12:40

## 2019-09-25 RX ADMIN — MEXILETINE HYDROCHLORIDE 300 MG: 150 CAPSULE ORAL at 14:42

## 2019-09-25 RX ADMIN — MEXILETINE HYDROCHLORIDE 300 MG: 150 CAPSULE ORAL at 05:06

## 2019-09-25 RX ADMIN — LINAGLIPTIN 5 MG: 5 TABLET, FILM COATED ORAL at 09:28

## 2019-09-25 RX ADMIN — Medication 10 ML: at 20:00

## 2019-09-25 RX ADMIN — FUROSEMIDE 40 MG: 10 INJECTION, SOLUTION INTRAMUSCULAR; INTRAVENOUS at 09:29

## 2019-09-25 RX ADMIN — METOPROLOL TARTRATE 50 MG: 50 TABLET, FILM COATED ORAL at 21:47

## 2019-09-25 RX ADMIN — HYDRALAZINE HYDROCHLORIDE 75 MG: 50 TABLET, FILM COATED ORAL at 05:06

## 2019-09-25 RX ADMIN — HYDRALAZINE HYDROCHLORIDE 100 MG: 50 TABLET, FILM COATED ORAL at 20:00

## 2019-09-25 RX ADMIN — METOPROLOL TARTRATE 50 MG: 50 TABLET, FILM COATED ORAL at 09:30

## 2019-09-25 ASSESSMENT — PAIN SCALES - GENERAL
PAINLEVEL_OUTOF10: 0

## 2019-09-25 NOTE — PROGRESS NOTES
elevated. Continue home meds. Hydralazine 10mg IV q6 PRN ordered for SBP>180. If SBP continues to be greater than 180, may need to consider Cardene gtt to control BP. HR is 58.  9/25: BP equal both arms. increased Hydralazine to 100 TID from 75. BP already down to 160-170 range. Will aim for gradually lowering BP. 6. Diabetes Mellitus, Type II: Glucose on arrival is 63. Pt is on Glipizide and Tradjenta at home. Will d/c Glipizide for now d/t low glucose. Recheck glucose tonight at 6pm  9/25: BS 87 this a.m. CCM. Expected discharge date:  TBD         Disposition:      [] Home                             [] TCU                             [] Rehab                             [] Psych                             [] SNF                             [] [place]                             [] Other- TBD  Chief Complaint:   Chief Complaint   Patient presents with    Shortness of Breath    Flank Pain       Hospital Course: Patient was seen, examined and the medical chart was reviewed thoroughly today. In summary, 47 y.o.male admitted overnight for uncontrolled HTN and decompesated CHF. Subjective (past 24 hours):   denies CP/SOB/diaphoresis/fever/chills. Back pain resolved.      Medications:  Reviewed    Infusion Medications   Scheduled Medications    hydrALAZINE  100 mg Oral 3 times per day    sodium chloride flush  10 mL Intravenous 2 times per day    amiodarone  200 mg Oral Daily    clopidogrel  75 mg Oral Daily    isosorbide mononitrate  60 mg Oral Daily    metoprolol tartrate  50 mg Oral BID    potassium chloride  20 mEq Oral Daily    linagliptin  5 mg Oral Daily    [Held by provider] lisinopril  10 mg Oral Daily    warfarin (COUMADIN) daily dosing (placeholder)   Other RX Placeholder    mexiletine  300 mg Oral 3 times per day    furosemide  40 mg Intravenous BID    nicotine  1 patch Transdermal Daily     PRN Meds: sodium chloride flush, magnesium hydroxide, ondansetron, XR CHEST (2 VW) CLINICAL INFORMATION: short of breath, hx chf. COMPARISON: 3/3/2019 TECHNIQUE: PA and lateral views the chest. FINDINGS: The heart remains enlarged. Postoperative sternotomy changes and a bipolar pacemaker are again identified. Coarse interstitial markings are present. Mild bronchitis and/or developing infiltrates in the bilateral lower lobes cannot be excluded. There is no acute congestive failure. There is no visualized effusion or pneumothorax. The skeleton is negative for active or suspicious pathology. 1. Cardiomegaly with postoperative sternotomy changes in visualized pacemaker. 2. Mild bibasilar atelectasis and or developing infiltrates not excluded. **This report has been created using voice recognition software. It may contain minor errors which are inherent in voice recognition technology. ** Final report electronically signed by Dr. Rodolfo Dee on 9/24/2019 7:03 AM      Diet: DIET LOW SODIUM 2 GM; 2000 ml    DVT prophylaxis: [] Lovenox                                 [] SCDs                                 [] SQ Heparin                                 [] Encourage ambulation           [x] Already on Anticoagulation       Code Status: Full Code      Active Hospital Problems    Diagnosis Date Noted    Acute on chronic combined systolic and diastolic CHF (congestive heart failure) ([facility] [address] [I50.43] 09/24/2019           Patient was updated about the treatment plan, all the questions and concerns were addressed.         Electronically signed by Jeffrey Abdalla MD on 9/25/2019 at 9:10 AM

## 2019-09-25 NOTE — PROGRESS NOTES
Nutrition Education    Type and Reason for Visit: Initial, Consult, Patient Education(heart failure guidelines)    Nutrition Assessment:   Attempted to see pt. Pt. at stress test per staff. · Dx: CHF. BMI=36.1  · Written educational materials provided on low sodium 2000ml fluid restriction diet. · Contact name and number provided. · Refer to Patient Education activity for more details.     Electronically signed by Alis Curiel RD, GIANFRANCO on 9/25/19 at 2:09 PM    Contact Number: [phone]

## 2019-09-25 NOTE — PLAN OF CARE
home alone at d..   9/25/2019 0149 by Shireen Armas RN  Outcome: Ongoing     Problem: Falls - Risk of:  Goal: Will remain free from falls  Description  Will remain free from falls  Outcome: Ongoing  Note:   Patient is free from falls. One assist with ambulation. Standby. Patient is using call light appropriately at this time. Goal: Absence of physical injury  Description  Absence of physical injury  Outcome: Ongoing   Care plan reviewed with patient. Patient verbalize understanding of the plan of care and contribute to goal setting.

## 2019-09-25 NOTE — CARE COORDINATION
9/25/19, 8:20 AM      Mehta Fergusson day: 1  Location: [address] [place] [address] Reason for admit: Acute on chronic combined systolic and diastolic CHF (congestive heart failure) ([facility] [I50.43]   Procedure: 9/25 Echo done, results pending. Treatment Plan of Care: Hospitalist following, Cardiology consulted, still to see. BUN 29 (28), Creatinine 2.0 (1.8). Continues with iv Lasix BID. PCP: ISABELLA Faye CNP  Readmission Risk Score: 26%  Discharge Plan: Pt lives at home alone. Denies any needs at discharge.      Electronically signed by Sofía Hughes RN on 9/25/2019 at 11:33 AM
ml   Smoking status:  reports that he has been smoking cigarettes. He started smoking about 39 years ago. He has a 32.00 pack-year smoking history. He has quit using smokeless tobacco.   PCP: ISABELLA Cortez CNP  Readmission: No  Readmission Risk Score: 26%    Discharge Planning  Current Residence:     Living Arrangements:      Support Systems:     Current Services PTA:     Potential Assistance Needed:     Potential Assistance Purchasing Medications:     Does patient want to participate in local refill/ meds to beds program?     Type of Home Care Services:     Patient expects to be discharged to:     Expected Discharge date: Follow Up Appointment: Best Day/ Time:      Discharge Plan: Spoke with pt. He lives at home alone. He is independent and cares for himself. He drives, has a PCP and can get to all his appointments. Denies any DME or HH currently. Will monitor for any needs at discharge.       Evaluation: no    Electronically signed by Deandra Villa RN on 9/24/2019 at 1:59 PM

## 2019-09-25 NOTE — CONSULTS
[address] [place] [address]                                  CONSULTATION    PATIENT NAME: Lakisha GUZMAN                      :        1965  MED REC NO:   [id number]                           ROOM:       0023  ACCOUNT NO:   [de-identified]                           ADMIT DATE: 2019  PROVIDER:     JUVENTINO Joseph DATE:  2019    REASON FOR CONSULTATION:  Shortness of breath and chest discomfort. REFERRING PROVIDER:  Hospitalist Service. HISTORY OF PRESENT ILLNESS:  This is a pleasant 70-year-old gentleman  with a very complicated medical history, a history of previous open  heart surgery for anomalous left main coronary artery, who ended up  after that with heart failure, cardiomyopathy, requiring an ICD; who has  quite a bit of arrhythmias including V-tach and AFib, who has had  multiple hospitalizations, multiple evaluations, and who also suffers  from underlying significant anxiety and depression, and who over the  last five years has not necessarily done very well. He continues to  smoke. His risk factors are not the best controlled. He had a cardiac  catheterization and stent to the left circ last year by Dr. Pal Jack and  has been followed since then. He has been admitted a couple of times with congestive heart failure. He comes in with some worsening symptoms of shortness of breath. He  claims that he has been pretty compliant with the medications. He has  had some nonspecific chest heaviness. Initial workup and treatment was  reflective of possible CHF. He received a dose of diuretics. He did  fairly well. We were consulted to evaluate. His cardiac enzymes were mildly elevated; however, he has also got some  chronic renal disease. We were consulted to assist in the management of  the patient. REVIEW OF SYSTEMS:  No fever, no chills, no weight loss. No hematuria  or dysuria.   No

## 2019-09-26 VITALS
BODY MASS INDEX: 36.01 KG/M2 | TEMPERATURE: 97.5 F | OXYGEN SATURATION: 98 % | HEIGHT: 74 IN | DIASTOLIC BLOOD PRESSURE: 64 MMHG | HEART RATE: 54 BPM | RESPIRATION RATE: 18 BRPM | SYSTOLIC BLOOD PRESSURE: 168 MMHG | WEIGHT: 280.6 LBS

## 2019-09-26 LAB
ANION GAP SERPL CALCULATED.3IONS-SCNC: 14 MEQ/L (ref 8–16)
BUN BLDV-MCNC: 33 MG/DL (ref 7–22)
CALCIUM SERPL-MCNC: 8.9 MG/DL (ref 8.5–10.5)
CHLORIDE BLD-SCNC: 104 MEQ/L (ref 98–111)
CO2: 24 MEQ/L (ref 23–33)
CREAT SERPL-MCNC: 2 MG/DL (ref 0.4–1.2)
GFR SERPL CREATININE-BSD FRML MDRD: 35 ML/MIN/1.73M2
GLUCOSE BLD-MCNC: 99 MG/DL (ref 70–108)
INR BLD: 1.14 (ref 0.85–1.13)
MAGNESIUM: 2.1 MG/DL (ref 1.6–2.4)
POTASSIUM SERPL-SCNC: 3.8 MEQ/L (ref 3.5–5.2)
PRO-BNP: 3963 PG/ML (ref 0–900)
SODIUM BLD-SCNC: 142 MEQ/L (ref 135–145)

## 2019-09-26 PROCEDURE — 6360000002 HC RX W HCPCS: Performed by: NURSE PRACTITIONER

## 2019-09-26 PROCEDURE — 99232 SBSQ HOSP IP/OBS MODERATE 35: CPT | Performed by: NURSE PRACTITIONER

## 2019-09-26 PROCEDURE — 6370000000 HC RX 637 (ALT 250 FOR IP): Performed by: HOSPITALIST

## 2019-09-26 PROCEDURE — 6370000000 HC RX 637 (ALT 250 FOR IP): Performed by: PHYSICIAN ASSISTANT

## 2019-09-26 PROCEDURE — 36415 COLL VENOUS BLD VENIPUNCTURE: CPT

## 2019-09-26 PROCEDURE — 99233 SBSQ HOSP IP/OBS HIGH 50: CPT | Performed by: HOSPITALIST

## 2019-09-26 PROCEDURE — 2580000003 HC RX 258: Performed by: PHYSICIAN ASSISTANT

## 2019-09-26 PROCEDURE — 83880 ASSAY OF NATRIURETIC PEPTIDE: CPT

## 2019-09-26 PROCEDURE — 85610 PROTHROMBIN TIME: CPT

## 2019-09-26 PROCEDURE — 80048 BASIC METABOLIC PNL TOTAL CA: CPT

## 2019-09-26 PROCEDURE — 83735 ASSAY OF MAGNESIUM: CPT

## 2019-09-26 PROCEDURE — 94760 N-INVAS EAR/PLS OXIMETRY 1: CPT

## 2019-09-26 RX ORDER — WARFARIN SODIUM 7.5 MG/1
7.5 TABLET ORAL ONCE
Status: DISCONTINUED | OUTPATIENT
Start: 2019-09-26 | End: 2019-09-26 | Stop reason: HOSPADM

## 2019-09-26 RX ORDER — BUMETANIDE 0.25 MG/ML
1 INJECTION, SOLUTION INTRAMUSCULAR; INTRAVENOUS ONCE
Status: DISCONTINUED | OUTPATIENT
Start: 2019-09-26 | End: 2019-09-26 | Stop reason: RX

## 2019-09-26 RX ORDER — ISOSORBIDE MONONITRATE 60 MG/1
120 TABLET, EXTENDED RELEASE ORAL DAILY
Status: DISCONTINUED | OUTPATIENT
Start: 2019-09-26 | End: 2019-09-26 | Stop reason: HOSPADM

## 2019-09-26 RX ORDER — METOPROLOL TARTRATE 50 MG/1
25 TABLET, FILM COATED ORAL 2 TIMES DAILY
Qty: 270 TABLET | Refills: 1 | Status: SHIPPED | OUTPATIENT
Start: 2019-09-26 | End: 2020-08-18 | Stop reason: SDUPTHER

## 2019-09-26 RX ORDER — NICOTINE 21 MG/24HR
1 PATCH, TRANSDERMAL 24 HOURS TRANSDERMAL DAILY
Qty: 30 PATCH | Refills: 3 | Status: SHIPPED | OUTPATIENT
Start: 2019-09-27 | End: 2020-06-02

## 2019-09-26 RX ORDER — HYDRALAZINE HYDROCHLORIDE 100 MG/1
100 TABLET, FILM COATED ORAL 3 TIMES DAILY
Qty: 50 TABLET | Refills: 1 | Status: SHIPPED | OUTPATIENT
Start: 2019-09-26 | End: 2019-12-04

## 2019-09-26 RX ORDER — ISOSORBIDE MONONITRATE 60 MG/1
120 TABLET, EXTENDED RELEASE ORAL DAILY
Qty: 90 TABLET | Refills: 1 | Status: SHIPPED | OUTPATIENT
Start: 2019-09-26 | End: 2020-01-09 | Stop reason: SDUPTHER

## 2019-09-26 RX ORDER — FUROSEMIDE 10 MG/ML
40 INJECTION INTRAMUSCULAR; INTRAVENOUS ONCE
Status: COMPLETED | OUTPATIENT
Start: 2019-09-26 | End: 2019-09-26

## 2019-09-26 RX ADMIN — AMIODARONE HYDROCHLORIDE 200 MG: 200 TABLET ORAL at 09:03

## 2019-09-26 RX ADMIN — Medication 10 ML: at 12:48

## 2019-09-26 RX ADMIN — HYDRALAZINE HYDROCHLORIDE 100 MG: 50 TABLET, FILM COATED ORAL at 05:43

## 2019-09-26 RX ADMIN — MEXILETINE HYDROCHLORIDE 300 MG: 150 CAPSULE ORAL at 13:44

## 2019-09-26 RX ADMIN — BUMETANIDE 2 MG: 1 TABLET ORAL at 09:03

## 2019-09-26 RX ADMIN — Medication 10 ML: at 09:03

## 2019-09-26 RX ADMIN — ISOSORBIDE MONONITRATE 120 MG: 60 TABLET ORAL at 09:03

## 2019-09-26 RX ADMIN — MEXILETINE HYDROCHLORIDE 300 MG: 150 CAPSULE ORAL at 05:43

## 2019-09-26 RX ADMIN — LINAGLIPTIN 5 MG: 5 TABLET, FILM COATED ORAL at 09:03

## 2019-09-26 RX ADMIN — CLOPIDOGREL BISULFATE 75 MG: 75 TABLET ORAL at 09:03

## 2019-09-26 RX ADMIN — METOPROLOL TARTRATE 50 MG: 50 TABLET, FILM COATED ORAL at 09:03

## 2019-09-26 RX ADMIN — POTASSIUM CHLORIDE 20 MEQ: 1500 TABLET, EXTENDED RELEASE ORAL at 09:03

## 2019-09-26 RX ADMIN — HYDRALAZINE HYDROCHLORIDE 100 MG: 50 TABLET, FILM COATED ORAL at 13:44

## 2019-09-26 RX ADMIN — FUROSEMIDE 40 MG: 10 INJECTION, SOLUTION INTRAMUSCULAR; INTRAVENOUS at 12:48

## 2019-09-26 ASSESSMENT — PAIN SCALES - GENERAL
PAINLEVEL_OUTOF10: 0

## 2019-09-26 NOTE — PROGRESS NOTES
Dr. Emelina Vang and this RN in to speak with patient. Dr. Emelina Vang explained to patient that he believes patient is unstable to be discharged d/t low heart rate and the need for medications adjustments. Patient verbalizing that he wants to go home. Dr. Emelina Vang explained that he would have to leave against medical advise and explained that insurances don't always cover hospital stay if he does this. Patient Still wanting to leave. House supervisor Robin updated and patient signed ama form.

## 2019-09-26 NOTE — DISCHARGE SUMMARY
With RN in room, I had a length discussion about concerns for bradycardia and uncontrolled BP. Explained changes made to his regimne and need for F/U w/ PCP. Nevertheless pt wishes to leave. I still went over concerns and proceeded w/ providing him w/ medication at current in hospital dose and frequency. He understands and appreciates the concerns and still insists about his decision to leave AMA. He was in stable medical condition when left the hospital.    Attached below is the full progress note from today:    Patient:  Alden Mueller                            Unit/Bed:3B-23/023-A     YOB: 1965     MRN: [id number]                                    Acct: [de-identified]      PCP: ISABELLA Mckeon CNP     Date of Admission: 9/24/2019          Active Hospital Problems     Diagnosis Date Noted    Acute on chronic combined systolic and diastolic HF (heart failure) ([facility] [I50.43] 09/24/2019    Elevated troponin [R74.8]               1. Query acute on chronic CHF, combined: Echo from 02/2019 showed EF 34-18% with systolic and diastolic dysfunction. BNP and troponin elevated. CXR shows cardiomegaly and atelectasis. Started pt on Bumex 2mg IV bid. Continue home meds- amiodarone, Plavix, hydralazine, Imdur, lisinopril, lopressor, Mexitil. Monitor for fluid overload. Daily weights. Strict I/Os. Low sodium diet. Fluid restriction. Pt already follows with HF clinic.   9/25: responded well to diuresis with +++ U/O. On R/A. Held ACEi d/t A.CKD. Will consider stepping down to PO diuretics given clinically euvolemic today; will consider holding diuretic if plan for [facility] given abnormal cardiac Nm stress test. Daily volume assessment     9/26: improved. Net fluid balance negative by 6.8 L. CCM. Cr stable at 2.0. Not far from baseline. Will monitor   2. Elevated troponin, with extensive cardiac history of CABG (2014) & PCI: Troponin on arrival 0.056, 3 hrs later 0.054.  (Baseline 0.02-0.04).  EKG showed T wave

## 2019-09-26 NOTE — PROGRESS NOTES
LE swelling is gone today       Objective:   BP (!) 174/83   Pulse (!) 48 Comment:  rechecked and Rn Notified  Temp 97.8 °F (36.6 °C) (Oral)   Resp 18   Ht 6' 2\" (1.88 m)   Wt 280 lb 9.6 oz (127.3 kg)   SpO2 94%   BMI 36.03 kg/m²        TELEMETRY: SB    Physical Exam:  General Appearance: alert and oriented to person, place and time, in no acute distress  Cardiovascular: normal rate, regular rhythm, normal S1 and S2, no murmurs, rubs, clicks, or gallops, distal pulses intact,   Pulmonary/Chest: clear to auscultation bilaterally- no wheezes, rales or rhonchi, normal air movement, no respiratory distress  Abdomen: soft, non-tender, non-distended, normal bowel sounds, no masses Extremities: no cyanosis, clubbing or edema, pulses present    Skin: warm and dry, no rash or erythema  Head: normocephalic and atraumatic  Musculoskeletal: normal range of motion, no joint swelling, deformity or tenderness  Neurological: alert, oriented, normal speech, no focal findings or movement disorder noted    Medications:    isosorbide mononitrate  120 mg Oral Daily    hydrALAZINE  100 mg Oral 3 times per day    bumetanide  2 mg Oral Daily    sodium chloride flush  10 mL Intravenous 2 times per day    amiodarone  200 mg Oral Daily    clopidogrel  75 mg Oral Daily    metoprolol tartrate  50 mg Oral BID    potassium chloride  20 mEq Oral Daily    linagliptin  5 mg Oral Daily    [Held by provider] lisinopril  10 mg Oral Daily    warfarin (COUMADIN) daily dosing (placeholder)   Other RX Placeholder    mexiletine  300 mg Oral 3 times per day    nicotine  1 patch Transdermal Daily         sodium chloride flush 10 mL PRN   magnesium hydroxide 30 mL Daily PRN   ondansetron 4 mg Q6H PRN   acetaminophen 650 mg Q4H PRN   hydrALAZINE 10 mg Q6H PRN       Diagnostics:  EK-SEP-2019 05:57:07 [facility] ROUTINE RETRIEVAL  Normal sinus rhythm  ST & T wave abnormality, consider lateral ischemia  Abnormal ECG  When hours.    CBC:   Lab Results   Component Value Date    WBC 7.6 09/25/2019    RBC 3.65 09/25/2019    RBC 4.75 11/03/2011    HGB 11.9 09/25/2019    HCT 36.9 09/25/2019     09/25/2019       CMP:  Lab Results   Component Value Date     09/26/2019    K 3.8 09/26/2019    K 4.1 02/03/2019     09/26/2019    CO2 24 09/26/2019    BUN 33 09/26/2019    CREATININE 2.0 09/26/2019    LABGLOM 35 09/26/2019    GLUCOSE 99 09/26/2019    CALCIUM 8.9 09/26/2019       Hepatic Function Panel:  Lab Results   Component Value Date    ALKPHOS 85 09/24/2019    ALT 39 09/24/2019    AST 41 09/24/2019    PROT 6.3 09/24/2019    BILITOT 0.3 09/24/2019    BILIDIR <0.2 05/18/2018    LABALBU 3.6 09/24/2019       Magnesium:    Lab Results   Component Value Date    MG 2.1 09/26/2019       PT/INR:    Lab Results   Component Value Date    INR 1.14 09/26/2019       HgBA1c:    Lab Results   Component Value Date    LABA1C 5.3 02/04/2019       FLP:  Lab Results   Component Value Date    TRIG 151 09/25/2019    HDL 39 09/25/2019    LDLCALC 103 09/25/2019       TSH:    Lab Results   Component Value Date    TSH 2.910 02/03/2019         Assessment:    Acute on chronic systolic/ diastolic  chf [address] 3   Secondary to intermittently missing diuretics    Noncompliance with diet     Elevated trop: 0.05 / 0.06   likely secondary to the above    No anginal symptoms        Abnormal stress testing    No anginal symptoms - low thresh hold to cath especially with CKD    CKD stage 3    CAD: CABG x2 May 2014 for anolomous LM  S\ cath 2/16/2018: s\p  3.5 x 23 mm bare metal stent LCX    ICDMP EF 40%   ICD    Hx VT - s\p ablation 2014  Hx AFB - on coumadin     HTN  HLP  DM II  anemia      Plan:  · Ok to DC   · Follow with Celia Raisin as scheduled oct 9  · Follow with CHF clinic 2-3 weeks    · Reinforced to weigh self (doesn't)  · reinforced to take diuretics daily (doesn't)         Electronically signed by ISABELLA Mota CNP on 9/26/2019 at 9:28 AM

## 2019-09-26 NOTE — PROGRESS NOTES
Hospitalist Progress Note    Patient:  Ottoniel Roberts      Unit/Bed:3B-23/023-A    YOB: 1965    MRN: [id number]       Acct: [de-identified]     PCP: ISABELLA Bush CNP    Date of Admission: 9/24/2019    Active Hospital Problems    Diagnosis Date Noted    Acute on chronic combined systolic and diastolic HF (heart failure) ([facility]) [I50.43] 09/24/2019    Elevated troponin [R74.8]        Assessment/Plan:    1. Query acute on chronic CHF, combined: Echo from 02/2019 showed EF 45-98% with systolic and diastolic dysfunction. BNP and troponin elevated. CXR shows cardiomegaly and atelectasis. Started pt on Bumex 2mg IV bid. Continue home meds- amiodarone, Plavix, hydralazine, Imdur, lisinopril, lopressor, Mexitil. Monitor for fluid overload. Daily weights. Strict I/Os. Low sodium diet. Fluid restriction. Pt already follows with HF clinic.   9/25: responded well to diuresis with +++ U/O. On R/A. Held ACEi d/t A.CKD. Will consider stepping down to PO diuretics given clinically euvolemic today; will consider holding diuretic if plan for [facility] given abnormal cardiac Nm stress test. Daily volume assessment    9/26: improved. Net fluid balance negative by 6.8 L. CCM. Cr stable at 2.0. Not far from baseline. Will monitor   2. Elevated troponin, with extensive cardiac history of CABG (2014) & PCI: Troponin on arrival 0.056, 3 hrs later 0.054. (Baseline 0.02-0.04). EKG showed T wave inversion in lateral leads. Continue to follow troponin trend. PCI on 2/2018. Cardiology consulted. 9/25: demand-related. vs ACS. Pt denies CP. ECG negative for any new/dynamic ischemic changes. Stress test showed reversible ischemia inferiorly; cardiology to advise re: [facility]    9/26: LHC not warranted per cardiology w/ plan for OP F/U    3. Afib, rate controlled: Rate 58. ICD placed 2014. On coumadin, INR 1.50. Continue coumadin and Mexitil. 9/26: noted HR in 40s this a.m. Pt asymptomatic. Will monitor.  Reduced metoprolol dose to 25

## 2019-09-27 ENCOUNTER — TELEPHONE (OUTPATIENT)
Dept: FAMILY MEDICINE CLINIC | Age: 54
End: 2019-09-27

## 2019-09-27 ENCOUNTER — CARE COORDINATION (OUTPATIENT)
Dept: CASE MANAGEMENT | Age: 54
End: 2019-09-27

## 2019-09-27 NOTE — TELEPHONE ENCOUNTER
[address] Transitions Initial Follow Up Call    Outreach made within 2 business days of discharge: Yes    Patient: Brittany Rahman Patient : 1965   MRN: [id number]  Reason for Admission: There are no discharge diagnoses documented for the most recent discharge. Discharge Date: 19       Spoke with: patient    Discharge department/facility: home    TCM Interactive Patient Contact:  Was patient able to fill all prescriptions: Yes  Was patient instructed to bring all medications to the follow-up visit: Yes  Is patient taking all medications as directed in the discharge summary?  Yes  Does patient understand their discharge instructions: Yes  Does patient have questions or concerns that need addressed prior to 7-14 day follow up office visit: no    Scheduled appointment with PCP within 7-14 days    Follow Up  Future Appointments   Date Time Provider Tate Hunt   10/9/2019  4:00 PM Daiana Greenwood MD [address] [facility]   2019  3:30 PM Phillip Montenegro [address] [place] [address]   2020 11:00  [facility], APRN - CNP [facility] - [place] [address]   3/3/2020  3:30 PM SCHEDULE, [facility] PACER NURSE [address] [facility] [facility] [place] [facility] [address]   3/31/2020  3:00 PM Americo Rubinstein, [facility] - [address] ([address] [place] [address]

## 2019-09-28 ENCOUNTER — CARE COORDINATION (OUTPATIENT)
Dept: CASE MANAGEMENT | Age: 54
End: 2019-09-28

## 2019-09-28 NOTE — CARE COORDINATION
[address] Transitions Initial Follow Up Call    Call within 2 business days of discharge: Yes    Patient: Rosie Mota Patient : 1965   MRN: <[id number]>  Reason for Admission: Acute on chronic combined systolic and diastolic HF  Discharge Date: 19 RARS: Readmission Risk Score: 27      Last Discharge [facility]       Complaint Diagnosis Description Type Department Provider    19 Shortness of Breath; Flank Pain Acute on chronic combined systolic and diastolic HF (heart failure) ([address] . .. ED to Hosp-Admission (Discharged) (ADMITTED) Delia Solorio MD; Regan Meyers. .. Spoke with: No one    Facility: [facility]    Second attempt to reach the patient for initial Care Transition call post hospital discharge. Message left with CTN's contact information requesting return phone call.       Follow Up  Future Appointments   Date Time Provider Tate Hunt   10/3/2019  3:15 PM ISABELLA Macdonald   10/9/2019  4:00 PM Nasima Reyna MD [address] [place] [facility] [address]   2019  3:30 PM ISABELLA Macdonald   2020 11:00 AM ISABELLA Wray - CNP SRPX CHF [facility] - Fidel Bar   3/3/2020  3:30 PM SCHEDULE, SARAH PACER NURSE [id number] L[address]   3/31/2020  3:00 PM Cheyanne Garsia Urology [facility] - Fidel Miner RN

## 2019-09-30 ENCOUNTER — CARE COORDINATION (OUTPATIENT)
Dept: CASE MANAGEMENT | Age: 54
End: 2019-09-30

## 2019-09-30 RX ORDER — POTASSIUM CHLORIDE 20 MEQ/1
20 TABLET, EXTENDED RELEASE ORAL DAILY
Qty: 30 TABLET | Refills: 5 | Status: SHIPPED | OUTPATIENT
Start: 2019-09-30 | End: 2020-03-30 | Stop reason: SDUPTHER

## 2019-09-30 NOTE — TELEPHONE ENCOUNTER
Elenita Pressley called requesting a refill on the following medications:  Requested Prescriptions     Pending Prescriptions Disp Refills    potassium chloride (KLOR-CON M) 20 MEQ extended release tablet 30 tablet 1     Sig: Take 1 tablet by mouth daily     Pharmacy verified:  .pv    [facility]  PATIENT IS OUT OF MEDICATION    Date of last visit: 06/04/19  Date of next visit (if applicable): 34/7/2970

## 2019-10-03 ENCOUNTER — OFFICE VISIT (OUTPATIENT)
Dept: FAMILY MEDICINE CLINIC | Age: 54
End: 2019-10-03
Payer: COMMERCIAL

## 2019-10-03 VITALS
HEART RATE: 56 BPM | BODY MASS INDEX: 35.06 KG/M2 | DIASTOLIC BLOOD PRESSURE: 86 MMHG | SYSTOLIC BLOOD PRESSURE: 142 MMHG | OXYGEN SATURATION: 98 % | WEIGHT: 273.1 LBS | RESPIRATION RATE: 12 BRPM

## 2019-10-03 DIAGNOSIS — Z95.810 ICD (IMPLANTABLE CARDIOVERTER-DEFIBRILLATOR) IN PLACE: ICD-10-CM

## 2019-10-03 DIAGNOSIS — E11.22 CONTROLLED TYPE 2 DIABETES MELLITUS WITH CHRONIC KIDNEY DISEASE, WITHOUT LONG-TERM CURRENT USE OF INSULIN, UNSPECIFIED CKD STAGE (HCC): ICD-10-CM

## 2019-10-03 DIAGNOSIS — I50.43 ACUTE ON CHRONIC COMBINED SYSTOLIC AND DIASTOLIC CONGESTIVE HEART FAILURE (HCC): Primary | ICD-10-CM

## 2019-10-03 DIAGNOSIS — I48.0 PAROXYSMAL ATRIAL FIBRILLATION (HCC): ICD-10-CM

## 2019-10-03 DIAGNOSIS — Z79.01 ANTICOAGULATED ON COUMADIN: ICD-10-CM

## 2019-10-03 DIAGNOSIS — E78.2 MIXED HYPERLIPIDEMIA: ICD-10-CM

## 2019-10-03 DIAGNOSIS — N18.30 CKD (CHRONIC KIDNEY DISEASE) STAGE 3, GFR 30-59 ML/MIN (HCC): ICD-10-CM

## 2019-10-03 DIAGNOSIS — F41.3 OTHER MIXED ANXIETY DISORDERS: ICD-10-CM

## 2019-10-03 DIAGNOSIS — I10 ESSENTIAL HYPERTENSION: ICD-10-CM

## 2019-10-03 PROCEDURE — 99495 TRANSJ CARE MGMT MOD F2F 14D: CPT | Performed by: NURSE PRACTITIONER

## 2019-10-03 RX ORDER — AMLODIPINE BESYLATE 5 MG/1
5 TABLET ORAL DAILY
Qty: 90 TABLET | Refills: 3 | Status: SHIPPED | OUTPATIENT
Start: 2019-10-03 | End: 2019-10-09

## 2019-10-04 ASSESSMENT — ENCOUNTER SYMPTOMS
SHORTNESS OF BREATH: 0
CHEST TIGHTNESS: 0
ABDOMINAL PAIN: 0
COUGH: 0
NAUSEA: 0
BACK PAIN: 1

## 2019-10-07 ENCOUNTER — HOSPITAL ENCOUNTER (OUTPATIENT)
Age: 54
Discharge: HOME | End: 2019-10-07
Payer: COMMERCIAL

## 2019-10-07 ENCOUNTER — TELEPHONE (OUTPATIENT)
Dept: FAMILY MEDICINE CLINIC | Age: 54
End: 2019-10-07

## 2019-10-07 DIAGNOSIS — E11.22 CONTROLLED TYPE 2 DIABETES MELLITUS WITH CHRONIC KIDNEY DISEASE, WITHOUT LONG-TERM CURRENT USE OF INSULIN, UNSPECIFIED CKD STAGE (HCC): ICD-10-CM

## 2019-10-07 DIAGNOSIS — N18.30 CKD (CHRONIC KIDNEY DISEASE) STAGE 3, GFR 30-59 ML/MIN (HCC): ICD-10-CM

## 2019-10-07 LAB
ANION GAP SERPL CALCULATED.3IONS-SCNC: 12 MEQ/L (ref 8–16)
BUN BLDV-MCNC: 32 MG/DL (ref 7–22)
CALCIUM SERPL-MCNC: 9.4 MG/DL (ref 8.5–10.5)
CHLORIDE BLD-SCNC: 101 MEQ/L (ref 98–111)
CO2: 25 MEQ/L (ref 23–33)
CREAT SERPL-MCNC: 2 MG/DL (ref 0.4–1.2)
GFR SERPL CREATININE-BSD FRML MDRD: 35 ML/MIN/1.73M2
GLUCOSE BLD-MCNC: 123 MG/DL (ref 70–108)
POTASSIUM SERPL-SCNC: 4.6 MEQ/L (ref 3.5–5.2)
SODIUM BLD-SCNC: 138 MEQ/L (ref 135–145)

## 2019-10-07 PROCEDURE — 80048 BASIC METABOLIC PNL TOTAL CA: CPT

## 2019-10-07 PROCEDURE — 36415 COLL VENOUS BLD VENIPUNCTURE: CPT

## 2019-10-09 ENCOUNTER — TELEPHONE (OUTPATIENT)
Dept: FAMILY MEDICINE CLINIC | Age: 54
End: 2019-10-09

## 2019-10-09 ENCOUNTER — OFFICE VISIT (OUTPATIENT)
Dept: CARDIOLOGY CLINIC | Age: 54
End: 2019-10-09
Payer: COMMERCIAL

## 2019-10-09 VITALS
SYSTOLIC BLOOD PRESSURE: 184 MMHG | WEIGHT: 272 LBS | HEIGHT: 74 IN | BODY MASS INDEX: 34.91 KG/M2 | DIASTOLIC BLOOD PRESSURE: 92 MMHG | HEART RATE: 60 BPM

## 2019-10-09 DIAGNOSIS — I10 ESSENTIAL HYPERTENSION: ICD-10-CM

## 2019-10-09 DIAGNOSIS — I25.5 ISCHEMIC CARDIOMYOPATHY: Primary | ICD-10-CM

## 2019-10-09 DIAGNOSIS — N28.9 RENAL INSUFFICIENCY: ICD-10-CM

## 2019-10-09 DIAGNOSIS — I48.0 PAROXYSMAL ATRIAL FIBRILLATION (HCC): ICD-10-CM

## 2019-10-09 DIAGNOSIS — I50.22 CHRONIC SYSTOLIC CONGESTIVE HEART FAILURE (HCC): ICD-10-CM

## 2019-10-09 DIAGNOSIS — I25.810 CORONARY ARTERY DISEASE INVOLVING CORONARY BYPASS GRAFT OF NATIVE HEART WITHOUT ANGINA PECTORIS: ICD-10-CM

## 2019-10-09 DIAGNOSIS — E11.22 CONTROLLED TYPE 2 DIABETES MELLITUS WITH CHRONIC KIDNEY DISEASE, WITHOUT LONG-TERM CURRENT USE OF INSULIN, UNSPECIFIED CKD STAGE (HCC): ICD-10-CM

## 2019-10-09 DIAGNOSIS — N18.30 CKD (CHRONIC KIDNEY DISEASE) STAGE 3, GFR 30-59 ML/MIN (HCC): Primary | ICD-10-CM

## 2019-10-09 PROCEDURE — 99214 OFFICE O/P EST MOD 30 MIN: CPT | Performed by: NUCLEAR MEDICINE

## 2019-10-09 RX ORDER — AMLODIPINE BESYLATE 5 MG/1
5 TABLET ORAL 2 TIMES DAILY
Qty: 180 TABLET | Refills: 3 | Status: SHIPPED | OUTPATIENT
Start: 2019-10-09 | End: 2020-08-18 | Stop reason: SDUPTHER

## 2019-10-10 ENCOUNTER — PROCEDURE VISIT (OUTPATIENT)
Dept: CARDIOLOGY CLINIC | Age: 54
End: 2019-10-10
Payer: COMMERCIAL

## 2019-10-10 DIAGNOSIS — I50.42 CHF (CONGESTIVE HEART FAILURE), NYHA CLASS II, CHRONIC, COMBINED (HCC): Primary | ICD-10-CM

## 2019-10-10 PROCEDURE — 93299 PR REM INTERROG ICPMS/SCRMS <30 D TECH REVIEW: CPT | Performed by: INTERNAL MEDICINE

## 2019-10-10 PROCEDURE — 93297 REM INTERROG DEV EVAL ICPMS: CPT | Performed by: INTERNAL MEDICINE

## 2019-10-11 ENCOUNTER — TELEPHONE (OUTPATIENT)
Dept: CARDIOLOGY CLINIC | Age: 54
End: 2019-10-11

## 2019-11-01 RX ORDER — BUMETANIDE 2 MG/1
2 TABLET ORAL DAILY
Qty: 30 TABLET | Refills: 5 | Status: SHIPPED | OUTPATIENT
Start: 2019-11-01 | End: 2020-02-03 | Stop reason: SDUPTHER

## 2019-11-08 RX ORDER — WARFARIN SODIUM 5 MG/1
TABLET ORAL
Qty: 180 TABLET | Refills: 5 | Status: SHIPPED | OUTPATIENT
Start: 2019-11-08 | End: 2021-01-20

## 2019-11-15 ENCOUNTER — PROCEDURE VISIT (OUTPATIENT)
Dept: CARDIOLOGY CLINIC | Age: 54
End: 2019-11-15
Payer: COMMERCIAL

## 2019-11-15 DIAGNOSIS — I50.43 ACUTE ON CHRONIC COMBINED SYSTOLIC AND DIASTOLIC CONGESTIVE HEART FAILURE (HCC): Primary | ICD-10-CM

## 2019-11-15 DIAGNOSIS — Z95.810 ICD (IMPLANTABLE CARDIOVERTER-DEFIBRILLATOR) IN PLACE: ICD-10-CM

## 2019-11-15 PROCEDURE — 93299 PR REM INTERROG ICPMS/SCRMS <30 D TECH REVIEW: CPT | Performed by: NUCLEAR MEDICINE

## 2019-11-15 PROCEDURE — 93297 REM INTERROG DEV EVAL ICPMS: CPT | Performed by: NUCLEAR MEDICINE

## 2019-11-21 DIAGNOSIS — N18.30 CKD (CHRONIC KIDNEY DISEASE), STAGE III (HCC): Primary | ICD-10-CM

## 2019-11-25 ENCOUNTER — HOSPITAL ENCOUNTER (OUTPATIENT)
Age: 54
Discharge: HOME | End: 2019-11-25
Payer: COMMERCIAL

## 2019-11-25 DIAGNOSIS — N18.30 CKD (CHRONIC KIDNEY DISEASE), STAGE III (HCC): ICD-10-CM

## 2019-11-25 LAB
ALBUMIN SERPL-MCNC: 3.8 G/DL (ref 3.5–5.1)
ALP BLD-CCNC: 84 U/L (ref 38–126)
ALT SERPL-CCNC: 37 U/L (ref 11–66)
ANION GAP SERPL CALCULATED.3IONS-SCNC: 13 MEQ/L (ref 8–16)
AST SERPL-CCNC: 44 U/L (ref 5–40)
BILIRUB SERPL-MCNC: 0.4 MG/DL (ref 0.3–1.2)
BUN BLDV-MCNC: 29 MG/DL (ref 7–22)
CALCIUM SERPL-MCNC: 9.3 MG/DL (ref 8.5–10.5)
CHLORIDE BLD-SCNC: 104 MEQ/L (ref 98–111)
CO2: 23 MEQ/L (ref 23–33)
CREAT SERPL-MCNC: 1.7 MG/DL (ref 0.4–1.2)
CREATININE URINE: 62.5 MG/DL
CREATININE, URINE: 62.5 MG/DL
GFR SERPL CREATININE-BSD FRML MDRD: 42 ML/MIN/1.73M2
GLUCOSE BLD-MCNC: 96 MG/DL (ref 70–108)
MICROALBUMIN UR-MCNC: 260.47 MG/DL
MICROALBUMIN/CREAT UR-RTO: 4168 MG/G (ref 0–30)
POTASSIUM SERPL-SCNC: 4.1 MEQ/L (ref 3.5–5.2)
PROT/CREAT RATIO, UR: 6.03
PROTEIN, URINE: 376.9 MG/DL
SODIUM BLD-SCNC: 140 MEQ/L (ref 135–145)
TOTAL PROTEIN: 7.1 G/DL (ref 6.1–8)

## 2019-11-25 PROCEDURE — 84156 ASSAY OF PROTEIN URINE: CPT

## 2019-11-25 PROCEDURE — 82570 ASSAY OF URINE CREATININE: CPT

## 2019-11-25 PROCEDURE — 80053 COMPREHEN METABOLIC PANEL: CPT

## 2019-11-25 PROCEDURE — 82043 UR ALBUMIN QUANTITATIVE: CPT

## 2019-11-25 PROCEDURE — 36415 COLL VENOUS BLD VENIPUNCTURE: CPT

## 2019-11-25 NOTE — ED NOTES
Pt walked to the bathroom but couldn't pee.       Ryan Carr, ALETA  02/14/18 9216
FAMILY HISTORY:  FH: kidney disease

## 2019-11-26 ENCOUNTER — OFFICE VISIT (OUTPATIENT)
Dept: NEPHROLOGY | Age: 54
End: 2019-11-26
Payer: COMMERCIAL

## 2019-11-26 VITALS
WEIGHT: 290 LBS | SYSTOLIC BLOOD PRESSURE: 154 MMHG | HEART RATE: 52 BPM | OXYGEN SATURATION: 99 % | BODY MASS INDEX: 37.23 KG/M2 | DIASTOLIC BLOOD PRESSURE: 78 MMHG

## 2019-11-26 DIAGNOSIS — N18.30 CKD (CHRONIC KIDNEY DISEASE), STAGE III (HCC): Primary | ICD-10-CM

## 2019-11-26 DIAGNOSIS — E11.21 DIABETIC NEPHROPATHY ASSOCIATED WITH TYPE 2 DIABETES MELLITUS (HCC): ICD-10-CM

## 2019-11-26 PROCEDURE — 99213 OFFICE O/P EST LOW 20 MIN: CPT | Performed by: INTERNAL MEDICINE

## 2019-11-27 RX ORDER — MEXILETINE HYDROCHLORIDE 150 MG/1
CAPSULE ORAL
Qty: 540 CAPSULE | Refills: 1 | Status: SHIPPED | OUTPATIENT
Start: 2019-11-27 | End: 2020-06-08

## 2019-12-04 ENCOUNTER — OFFICE VISIT (OUTPATIENT)
Dept: FAMILY MEDICINE CLINIC | Age: 54
End: 2019-12-04
Payer: COMMERCIAL

## 2019-12-04 VITALS
WEIGHT: 287.5 LBS | BODY MASS INDEX: 36.91 KG/M2 | DIASTOLIC BLOOD PRESSURE: 82 MMHG | HEART RATE: 84 BPM | RESPIRATION RATE: 20 BRPM | SYSTOLIC BLOOD PRESSURE: 138 MMHG | OXYGEN SATURATION: 98 %

## 2019-12-04 DIAGNOSIS — Z12.12 ENCOUNTER FOR COLORECTAL CANCER SCREENING: ICD-10-CM

## 2019-12-04 DIAGNOSIS — Z95.1 HX OF CABG: ICD-10-CM

## 2019-12-04 DIAGNOSIS — E78.2 MIXED HYPERLIPIDEMIA: ICD-10-CM

## 2019-12-04 DIAGNOSIS — F41.3 OTHER MIXED ANXIETY DISORDERS: ICD-10-CM

## 2019-12-04 DIAGNOSIS — Z12.11 ENCOUNTER FOR COLORECTAL CANCER SCREENING: ICD-10-CM

## 2019-12-04 DIAGNOSIS — Z95.810 ICD (IMPLANTABLE CARDIOVERTER-DEFIBRILLATOR) IN PLACE: ICD-10-CM

## 2019-12-04 DIAGNOSIS — N18.30 CKD (CHRONIC KIDNEY DISEASE) STAGE 3, GFR 30-59 ML/MIN (HCC): ICD-10-CM

## 2019-12-04 DIAGNOSIS — I48.0 PAROXYSMAL ATRIAL FIBRILLATION (HCC): ICD-10-CM

## 2019-12-04 DIAGNOSIS — I25.810 CORONARY ARTERY DISEASE INVOLVING CORONARY BYPASS GRAFT OF NATIVE HEART WITHOUT ANGINA PECTORIS: ICD-10-CM

## 2019-12-04 DIAGNOSIS — I50.22 CHRONIC SYSTOLIC CONGESTIVE HEART FAILURE (HCC): ICD-10-CM

## 2019-12-04 DIAGNOSIS — E11.22 CONTROLLED TYPE 2 DIABETES MELLITUS WITH CHRONIC KIDNEY DISEASE, WITHOUT LONG-TERM CURRENT USE OF INSULIN, UNSPECIFIED CKD STAGE (HCC): Primary | ICD-10-CM

## 2019-12-04 DIAGNOSIS — Z79.01 ANTICOAGULATED ON COUMADIN: ICD-10-CM

## 2019-12-04 DIAGNOSIS — I10 ESSENTIAL HYPERTENSION: ICD-10-CM

## 2019-12-04 PROBLEM — Z86.79 PERSONAL HISTORY OF OTHER DISEASES OF THE CIRCULATORY SYSTEM: Status: ACTIVE | Noted: 2019-12-04

## 2019-12-04 PROCEDURE — 99214 OFFICE O/P EST MOD 30 MIN: CPT | Performed by: NURSE PRACTITIONER

## 2019-12-04 RX ORDER — ALPRAZOLAM 0.5 MG/1
TABLET ORAL
Qty: 30 TABLET | Refills: 5 | Status: SHIPPED | OUTPATIENT
Start: 2019-12-04 | End: 2020-06-01

## 2019-12-04 RX ORDER — ALPRAZOLAM 0.5 MG/1
TABLET ORAL
Refills: 4 | COMMUNITY
Start: 2019-12-01 | End: 2020-06-02 | Stop reason: SDUPTHER

## 2019-12-04 RX ORDER — HYDRALAZINE HYDROCHLORIDE 50 MG/1
TABLET, FILM COATED ORAL
Refills: 5 | COMMUNITY
Start: 2019-11-27 | End: 2020-02-03 | Stop reason: ALTCHOICE

## 2019-12-04 RX ORDER — HYDRALAZINE HYDROCHLORIDE 100 MG/1
100 TABLET, FILM COATED ORAL 3 TIMES DAILY
Qty: 270 TABLET | Refills: 3 | Status: SHIPPED | OUTPATIENT
Start: 2019-12-04 | End: 2020-12-02

## 2019-12-04 ASSESSMENT — ENCOUNTER SYMPTOMS
NAUSEA: 0
SHORTNESS OF BREATH: 0
BACK PAIN: 1
COUGH: 0
ABDOMINAL PAIN: 0

## 2019-12-20 ENCOUNTER — PROCEDURE VISIT (OUTPATIENT)
Dept: CARDIOLOGY CLINIC | Age: 54
End: 2019-12-20
Payer: COMMERCIAL

## 2019-12-20 DIAGNOSIS — Z95.810 ICD (IMPLANTABLE CARDIOVERTER-DEFIBRILLATOR) IN PLACE: Primary | ICD-10-CM

## 2019-12-20 PROCEDURE — 93296 REM INTERROG EVL PM/IDS: CPT | Performed by: INTERNAL MEDICINE

## 2019-12-20 PROCEDURE — 93295 DEV INTERROG REMOTE 1/2/MLT: CPT | Performed by: INTERNAL MEDICINE

## 2020-01-09 RX ORDER — AMIODARONE HYDROCHLORIDE 200 MG/1
200 TABLET ORAL DAILY
Qty: 30 TABLET | Refills: 3 | Status: SHIPPED | OUTPATIENT
Start: 2020-01-09 | End: 2020-01-20 | Stop reason: SDUPTHER

## 2020-01-09 RX ORDER — ISOSORBIDE MONONITRATE 60 MG/1
120 TABLET, EXTENDED RELEASE ORAL DAILY
Qty: 90 TABLET | Refills: 1 | Status: SHIPPED | OUTPATIENT
Start: 2020-01-09 | End: 2020-01-20 | Stop reason: SDUPTHER

## 2020-01-09 NOTE — TELEPHONE ENCOUNTER
01/09/2020   Ryan Jara called requesting a refill on the following medications:  Requested Prescriptions     Pending Prescriptions Disp Refills    isosorbide mononitrate (IMDUR) 60 MG extended release tablet 90 tablet 1     Sig: Take 2 tablets by mouth daily     Pharmacy verified:  [email or web address]      Date of last visit: 12/04/2019  Date of next visit (if applicable): 1/2/6548

## 2020-01-20 ENCOUNTER — TELEPHONE (OUTPATIENT)
Dept: CARDIOLOGY CLINIC | Age: 55
End: 2020-01-20

## 2020-01-20 ENCOUNTER — TELEPHONE (OUTPATIENT)
Dept: FAMILY MEDICINE CLINIC | Age: 55
End: 2020-01-20

## 2020-01-20 RX ORDER — ISOSORBIDE MONONITRATE 120 MG/1
120 TABLET, EXTENDED RELEASE ORAL DAILY
Qty: 90 TABLET | Refills: 3 | Status: SHIPPED | OUTPATIENT
Start: 2020-01-20 | End: 2021-02-11 | Stop reason: SDUPTHER

## 2020-01-20 RX ORDER — CLOPIDOGREL BISULFATE 75 MG/1
75 TABLET ORAL DAILY
Qty: 90 TABLET | Refills: 3 | Status: SHIPPED | OUTPATIENT
Start: 2020-01-20 | End: 2020-08-18 | Stop reason: SDUPTHER

## 2020-01-20 RX ORDER — AMIODARONE HYDROCHLORIDE 200 MG/1
200 TABLET ORAL DAILY
Qty: 30 TABLET | Refills: 3 | Status: SHIPPED | OUTPATIENT
Start: 2020-01-20 | End: 2020-01-22

## 2020-01-21 ENCOUNTER — TELEPHONE (OUTPATIENT)
Dept: CARDIOLOGY CLINIC | Age: 55
End: 2020-01-21

## 2020-01-22 RX ORDER — AMIODARONE HYDROCHLORIDE 200 MG/1
200 TABLET ORAL DAILY
Qty: 90 TABLET | Refills: 1 | Status: SHIPPED | OUTPATIENT
Start: 2020-01-22 | End: 2020-08-18 | Stop reason: SDUPTHER

## 2020-01-27 ENCOUNTER — PROCEDURE VISIT (OUTPATIENT)
Dept: CARDIOLOGY CLINIC | Age: 55
End: 2020-01-27
Payer: COMMERCIAL

## 2020-01-27 PROCEDURE — 93297 REM INTERROG DEV EVAL ICPMS: CPT | Performed by: INTERNAL MEDICINE

## 2020-02-03 ENCOUNTER — OFFICE VISIT (OUTPATIENT)
Dept: CARDIOLOGY CLINIC | Age: 55
End: 2020-02-03
Payer: COMMERCIAL

## 2020-02-03 VITALS
DIASTOLIC BLOOD PRESSURE: 80 MMHG | HEIGHT: 74 IN | BODY MASS INDEX: 37.09 KG/M2 | OXYGEN SATURATION: 97 % | SYSTOLIC BLOOD PRESSURE: 148 MMHG | WEIGHT: 289 LBS

## 2020-02-03 PROCEDURE — 99213 OFFICE O/P EST LOW 20 MIN: CPT | Performed by: NURSE PRACTITIONER

## 2020-02-03 RX ORDER — BUMETANIDE 2 MG/1
2 TABLET ORAL DAILY
Qty: 90 TABLET | Refills: 3 | Status: SHIPPED | OUTPATIENT
Start: 2020-02-03 | End: 2021-02-08

## 2020-02-03 NOTE — PROGRESS NOTES
[facility]       Visit Date: 2/3/2020  Cardiologist:  Dr. Alisha Velez   Primary Care Physician: Dr. Nicole Asencio, APRN - CNP    Arianna Tanner is a 47 y.o. male who presents today for:  Chief Complaint   Patient presents with    Congestive Heart Failure       HPI: Obtained from patient and chart    Arianna Tanner is a 47 y.o. male who presents to the office for a follow up visit in the heart failure clinic. Hx CAD CABG, HTN, A fib on Coumadin, DM, EF 40% grade 1 DD, ICD. Weight is up 12 pounds since his OV. He relates to eating more, does have more LE swelling today. He smokes about a half pack a day. He works at [employer]. He can perform ADLs without SOB or fatigue. He does feel SOB with exertion. He does not monitor what he eats. Sleeps in a bed.     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    Anxiety disorder     Arthritis     Atrial fibrillation ([address]     CAD (coronary artery disease) 3/25/2014    s/p CABG in may 2014    Cardiomyopathy ([address]     Chronic kidney disease     Depression     Diabetes mellitus ([address]     Drug abuse ([address]     hx of cacaine abuse, stopped abusing drugs in 2012    GERD (gastroesophageal reflux disease)     H/O cardiac catheterization 4/30/2014    Hyperlipidemia     Hypertension     Intradural extramedullary spinal tumor     Lumbar spine tumor     Medtronic dual icd      Neuromuscular disorder ([address]     Other

## 2020-02-04 ENCOUNTER — TELEPHONE (OUTPATIENT)
Dept: FAMILY MEDICINE CLINIC | Age: 55
End: 2020-02-04

## 2020-02-04 ASSESSMENT — ENCOUNTER SYMPTOMS
COUGH: 0
SHORTNESS OF BREATH: 1
ABDOMINAL DISTENTION: 0
COLOR CHANGE: 0
ABDOMINAL PAIN: 0
WHEEZING: 0
NAUSEA: 0
CHEST TIGHTNESS: 0
APNEA: 0

## 2020-03-03 ENCOUNTER — HOSPITAL ENCOUNTER (OUTPATIENT)
Age: 55
Discharge: HOME | End: 2020-03-03
Payer: COMMERCIAL

## 2020-03-03 ENCOUNTER — NURSE ONLY (OUTPATIENT)
Dept: CARDIOLOGY CLINIC | Age: 55
End: 2020-03-03
Payer: COMMERCIAL

## 2020-03-03 LAB
AVERAGE GLUCOSE: 150 MG/DL (ref 70–126)
HBA1C MFR BLD: 7 % (ref 4.4–6.4)

## 2020-03-03 PROCEDURE — 36415 COLL VENOUS BLD VENIPUNCTURE: CPT

## 2020-03-03 PROCEDURE — 93283 PRGRMG EVAL IMPLANTABLE DFB: CPT | Performed by: INTERNAL MEDICINE

## 2020-03-03 PROCEDURE — 83036 HEMOGLOBIN GLYCOSYLATED A1C: CPT

## 2020-03-03 NOTE — PROGRESS NOTES
DR Gemini Banda PT/PARDEEP AFIB/ COUMADIN    [employer] DUAL ICD CHECK IN OFFICE PER MEGAN DOMINGUEZ REP  BATTERY 4.7 YRS REMAINING  A PACED 0%  V PACED 0%  PRESENTS IN NSR   NO EPISODES   SIC =0  P WAVES 1.5  RV WAVES 9.9  ATRIAL THRESHOLD 0.75 @ 0.4  RV THRESHOLD 0.75 @ 0.4  BLUNTED HRV BUT PT DENIES ANY WEATHERS OR FATIGUE  OPTIVOL WNL  NO CHANGES MADE  PT IS PROGRAMMED DDI

## 2020-03-06 ENCOUNTER — CARE COORDINATION (OUTPATIENT)
Dept: CARE COORDINATION | Age: 55
End: 2020-03-06

## 2020-03-25 PROBLEM — N17.9 AKI (ACUTE KIDNEY INJURY) (HCC): Status: RESOLVED | Noted: 2018-05-18 | Resolved: 2020-03-24

## 2020-03-30 RX ORDER — POTASSIUM CHLORIDE 20 MEQ/1
20 TABLET, EXTENDED RELEASE ORAL DAILY
Qty: 30 TABLET | Refills: 5 | Status: SHIPPED | OUTPATIENT
Start: 2020-03-30 | End: 2020-08-18 | Stop reason: SDUPTHER

## 2020-04-01 ENCOUNTER — TELEPHONE (OUTPATIENT)
Dept: UROLOGY | Age: 55
End: 2020-04-01

## 2020-04-20 ENCOUNTER — PROCEDURE VISIT (OUTPATIENT)
Dept: CARDIOLOGY CLINIC | Age: 55
End: 2020-04-20
Payer: COMMERCIAL

## 2020-04-20 PROCEDURE — G2066 INTER DEVC REMOTE 30D: HCPCS | Performed by: INTERNAL MEDICINE

## 2020-04-20 PROCEDURE — 93297 REM INTERROG DEV EVAL ICPMS: CPT | Performed by: INTERNAL MEDICINE

## 2020-05-21 ENCOUNTER — PROCEDURE VISIT (OUTPATIENT)
Dept: CARDIOLOGY CLINIC | Age: 55
End: 2020-05-21
Payer: COMMERCIAL

## 2020-05-21 PROCEDURE — 93297 REM INTERROG DEV EVAL ICPMS: CPT | Performed by: INTERNAL MEDICINE

## 2020-05-21 PROCEDURE — G2066 INTER DEVC REMOTE 30D: HCPCS | Performed by: INTERNAL MEDICINE

## 2020-06-02 ENCOUNTER — OFFICE VISIT (OUTPATIENT)
Dept: FAMILY MEDICINE CLINIC | Age: 55
End: 2020-06-02
Payer: COMMERCIAL

## 2020-06-02 VITALS
RESPIRATION RATE: 20 BRPM | WEIGHT: 279.1 LBS | BODY MASS INDEX: 35.83 KG/M2 | DIASTOLIC BLOOD PRESSURE: 92 MMHG | HEART RATE: 56 BPM | SYSTOLIC BLOOD PRESSURE: 160 MMHG | TEMPERATURE: 98.6 F

## 2020-06-02 LAB — HBA1C MFR BLD: 6.6 %

## 2020-06-02 PROCEDURE — 83036 HEMOGLOBIN GLYCOSYLATED A1C: CPT | Performed by: NURSE PRACTITIONER

## 2020-06-02 PROCEDURE — 99396 PREV VISIT EST AGE 40-64: CPT | Performed by: NURSE PRACTITIONER

## 2020-06-02 RX ORDER — ALPRAZOLAM 0.5 MG/1
TABLET ORAL
Qty: 30 TABLET | Refills: 5 | Status: SHIPPED | OUTPATIENT
Start: 2020-06-02 | End: 2020-07-10

## 2020-06-02 SDOH — ECONOMIC STABILITY: TRANSPORTATION INSECURITY
IN THE PAST 12 MONTHS, HAS LACK OF TRANSPORTATION KEPT YOU FROM MEETINGS, WORK, OR FROM GETTING THINGS NEEDED FOR DAILY LIVING?: NO

## 2020-06-02 SDOH — ECONOMIC STABILITY: FOOD INSECURITY: WITHIN THE PAST 12 MONTHS, THE FOOD YOU BOUGHT JUST DIDN'T LAST AND YOU DIDN'T HAVE MONEY TO GET MORE.: NEVER TRUE

## 2020-06-02 SDOH — ECONOMIC STABILITY: TRANSPORTATION INSECURITY
IN THE PAST 12 MONTHS, HAS THE LACK OF TRANSPORTATION KEPT YOU FROM MEDICAL APPOINTMENTS OR FROM GETTING MEDICATIONS?: NO

## 2020-06-02 SDOH — ECONOMIC STABILITY: INCOME INSECURITY: HOW HARD IS IT FOR YOU TO PAY FOR THE VERY BASICS LIKE FOOD, HOUSING, MEDICAL CARE, AND HEATING?: NOT HARD AT ALL

## 2020-06-02 SDOH — ECONOMIC STABILITY: FOOD INSECURITY: WITHIN THE PAST 12 MONTHS, YOU WORRIED THAT YOUR FOOD WOULD RUN OUT BEFORE YOU GOT MONEY TO BUY MORE.: NEVER TRUE

## 2020-06-02 ASSESSMENT — ENCOUNTER SYMPTOMS
BACK PAIN: 1
NAUSEA: 0
ABDOMINAL PAIN: 0
COUGH: 0
SHORTNESS OF BREATH: 0

## 2020-06-02 NOTE — PATIENT INSTRUCTIONS
You may receive a survey regarding the care you received during your visit. Your input is valuable to us. We encourage you to complete and return your survey. We hope you will choose us in the future for your healthcare needs. Patient Education        Recombinant Zoster (Shingles) Vaccine: What You Need to Know  Why get vaccinated? Recombinant zoster (shingles) vaccine can prevent shingles. Shingles (also called herpes zoster, or just zoster) is a painful skin rash, usually with blisters. In addition to the rash, shingles can cause fever, headache, chills, or upset stomach. More rarely, shingles can lead to pneumonia, hearing problems, blindness, brain inflammation (encephalitis), or death. The most common complication of shingles is long-term nerve pain called postherpetic neuralgia (PHN). PHN occurs in the areas where the shingles rash was, even after the rash clears up. It can last for months or years after the rash goes away. The pain from PHN can be severe and debilitating. About 10 to 18% of people who get shingles will experience PHN. The risk of PHN increases with age. An older adult with shingles is more likely to develop PHN and have longer lasting and more severe pain than a younger person with shingles. Shingles is caused by the varicella zoster virus, the same virus that causes chickenpox. After you have chickenpox, the virus stays in your body and can cause shingles later in life. Shingles cannot be passed from one person to another, but the virus that causes shingles can spread and cause chickenpox in someone who had never had chickenpox or received chickenpox vaccine. Recombinant shingles vaccine  Recombinant shingles vaccine provides strong protection against shingles. By preventing shingles, recombinant shingles vaccine also protects against PHN. Recombinant shingles vaccine is the preferred vaccine for the prevention of shingles.  However, a different vaccine, live shingles vaccine, may be used in some circumstances. The recombinant shingles vaccine is recommended for adults 50 years and older without serious immune problems. It is given as a two-dose series. This vaccine is also recommended for people who have already gotten another type of shingles vaccine, the live shingles vaccine. There is no live virus in this vaccine. Shingles vaccine may be given at the same time as other vaccines. Talk with your health care provider  Tell your vaccine provider if the person getting the vaccine:  · Has had an allergic reaction after a previous dose of recombinant shingles vaccine, or has any severe, life-threatening allergies. · Is pregnant or breastfeeding. · Is currently experiencing an episode of shingles. In some cases, your health care provider may decide to postpone shingles vaccination to a future visit. People with minor illnesses, such as a cold, may be vaccinated. People who are moderately or severely ill should usually wait until they recover before getting recombinant shingles vaccine. Your health care provider can give you more information. Risks of a vaccine reaction  · A sore arm with mild or moderate pain is very common after recombinant shingles vaccine, affecting about 80% of vaccinated people. Redness and swelling can also happen at the site of the injection. · Tiredness, muscle pain, headache, shivering, fever, stomach pain, and nausea happen after vaccination in more than half of people who receive recombinant shingles vaccine. In clinical trials, about 1 out of 6 people who got recombinant zoster vaccine experienced side effects that prevented them from doing regular activities. Symptoms usually went away on their own in 2 to 3 days. You should still get the second dose of recombinant zoster vaccine even if you had one of these reactions after the first dose. People sometimes faint after medical procedures, including vaccination.  Tell your provider if you feel dizzy

## 2020-06-02 NOTE — Clinical Note
Labs given to patient to complete from PCP yesterday appointment. Reminder for patient to complete RENAL labs from last Fall prior to appointment 6/21/20.   When go to lab only get labs from Dr. Lc Sales
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
## 2020-06-03 ENCOUNTER — TELEPHONE (OUTPATIENT)
Dept: FAMILY MEDICINE CLINIC | Age: 55
End: 2020-06-03

## 2020-06-03 NOTE — TELEPHONE ENCOUNTER
----- Message from ISABELLA Dias CNP sent at 6/3/2020  7:42 AM EDT -----  Labs given to patient to complete from PCP yesterday appointment. Reminder for patient to complete RENAL labs from last Fall prior to appointment 6/21/20.   When go to lab only get labs from Dr. Antonio Dick

## 2020-06-08 RX ORDER — MEXILETINE HYDROCHLORIDE 150 MG/1
CAPSULE ORAL
Qty: 540 CAPSULE | Refills: 1 | Status: SHIPPED | OUTPATIENT
Start: 2020-06-08 | End: 2020-12-28 | Stop reason: SDUPTHER

## 2020-06-22 ENCOUNTER — HOSPITAL ENCOUNTER (OUTPATIENT)
Age: 55
Discharge: HOME | End: 2020-06-22
Payer: COMMERCIAL

## 2020-06-22 DIAGNOSIS — N18.30 CKD (CHRONIC KIDNEY DISEASE), STAGE III (HCC): ICD-10-CM

## 2020-06-22 DIAGNOSIS — E11.21 DIABETIC NEPHROPATHY ASSOCIATED WITH TYPE 2 DIABETES MELLITUS (HCC): ICD-10-CM

## 2020-06-22 LAB
ALBUMIN SERPL-MCNC: 3.8 G/DL (ref 3.5–5.1)
ALP BLD-CCNC: 81 U/L (ref 38–126)
ALT SERPL-CCNC: 38 U/L (ref 11–66)
ANION GAP SERPL CALCULATED.3IONS-SCNC: 12 MEQ/L (ref 8–16)
AST SERPL-CCNC: 42 U/L (ref 5–40)
BILIRUB SERPL-MCNC: 0.4 MG/DL (ref 0.3–1.2)
BUN BLDV-MCNC: 22 MG/DL (ref 7–22)
CALCIUM SERPL-MCNC: 8.7 MG/DL (ref 8.5–10.5)
CHLORIDE BLD-SCNC: 104 MEQ/L (ref 98–111)
CO2: 21 MEQ/L (ref 23–33)
CREAT SERPL-MCNC: 1.6 MG/DL (ref 0.4–1.2)
CREATININE URINE: 130.9 MG/DL
GFR SERPL CREATININE-BSD FRML MDRD: 45 ML/MIN/1.73M2
GLUCOSE BLD-MCNC: 137 MG/DL (ref 70–108)
POTASSIUM SERPL-SCNC: 4.1 MEQ/L (ref 3.5–5.2)
PROSTATE SPECIFIC ANTIGEN: 0.59 NG/ML (ref 0–1)
PROTEIN, URINE: 319.2 MG/DL
SODIUM BLD-SCNC: 137 MEQ/L (ref 135–145)
TOTAL PROTEIN: 6.9 G/DL (ref 6.1–8)

## 2020-06-22 PROCEDURE — 36415 COLL VENOUS BLD VENIPUNCTURE: CPT

## 2020-06-22 PROCEDURE — 82570 ASSAY OF URINE CREATININE: CPT

## 2020-06-22 PROCEDURE — 80053 COMPREHEN METABOLIC PANEL: CPT

## 2020-06-22 PROCEDURE — 84153 ASSAY OF PSA TOTAL: CPT

## 2020-06-22 PROCEDURE — 82043 UR ALBUMIN QUANTITATIVE: CPT

## 2020-06-22 PROCEDURE — 84156 ASSAY OF PROTEIN URINE: CPT

## 2020-06-23 ENCOUNTER — OFFICE VISIT (OUTPATIENT)
Dept: NEPHROLOGY | Age: 55
End: 2020-06-23
Payer: COMMERCIAL

## 2020-06-23 VITALS
OXYGEN SATURATION: 98 % | BODY MASS INDEX: 35.18 KG/M2 | WEIGHT: 274 LBS | TEMPERATURE: 97.8 F | DIASTOLIC BLOOD PRESSURE: 82 MMHG | SYSTOLIC BLOOD PRESSURE: 152 MMHG | HEART RATE: 62 BPM

## 2020-06-23 LAB
CREATININE, URINE: 130.9 MG/DL
MICROALBUMIN UR-MCNC: 232.48 MG/DL
MICROALBUMIN/CREAT UR-RTO: 1776 MG/G (ref 0–30)

## 2020-06-23 PROCEDURE — 99213 OFFICE O/P EST LOW 20 MIN: CPT | Performed by: INTERNAL MEDICINE

## 2020-06-23 RX ORDER — LOSARTAN POTASSIUM 50 MG/1
50 TABLET ORAL DAILY
Qty: 90 TABLET | Refills: 1 | Status: SHIPPED | OUTPATIENT
Start: 2020-06-23 | End: 2020-12-21

## 2020-06-30 ENCOUNTER — PROCEDURE VISIT (OUTPATIENT)
Dept: CARDIOLOGY CLINIC | Age: 55
End: 2020-06-30
Payer: COMMERCIAL

## 2020-06-30 PROCEDURE — 93296 REM INTERROG EVL PM/IDS: CPT | Performed by: NUCLEAR MEDICINE

## 2020-06-30 PROCEDURE — 93295 DEV INTERROG REMOTE 1/2/MLT: CPT | Performed by: NUCLEAR MEDICINE

## 2020-08-03 ENCOUNTER — PROCEDURE VISIT (OUTPATIENT)
Dept: CARDIOLOGY CLINIC | Age: 55
End: 2020-08-03
Payer: COMMERCIAL

## 2020-08-03 PROCEDURE — G2066 INTER DEVC REMOTE 30D: HCPCS | Performed by: NUCLEAR MEDICINE

## 2020-08-03 PROCEDURE — 93297 REM INTERROG DEV EVAL ICPMS: CPT | Performed by: NUCLEAR MEDICINE

## 2020-08-18 ENCOUNTER — OFFICE VISIT (OUTPATIENT)
Dept: CARDIOLOGY CLINIC | Age: 55
End: 2020-08-18
Payer: COMMERCIAL

## 2020-08-18 VITALS
SYSTOLIC BLOOD PRESSURE: 154 MMHG | HEART RATE: 64 BPM | OXYGEN SATURATION: 98 % | DIASTOLIC BLOOD PRESSURE: 88 MMHG | HEIGHT: 74 IN | WEIGHT: 274 LBS | BODY MASS INDEX: 35.16 KG/M2

## 2020-08-18 PROCEDURE — 99214 OFFICE O/P EST MOD 30 MIN: CPT | Performed by: NURSE PRACTITIONER

## 2020-08-18 RX ORDER — AMIODARONE HYDROCHLORIDE 200 MG/1
200 TABLET ORAL DAILY
Qty: 90 TABLET | Refills: 3 | Status: SHIPPED | OUTPATIENT
Start: 2020-08-18 | End: 2021-08-10 | Stop reason: SDUPTHER

## 2020-08-18 RX ORDER — AMLODIPINE BESYLATE 5 MG/1
5 TABLET ORAL 2 TIMES DAILY
Qty: 180 TABLET | Refills: 3 | Status: SHIPPED | OUTPATIENT
Start: 2020-08-18 | End: 2021-08-10 | Stop reason: SDUPTHER

## 2020-08-18 RX ORDER — CLOPIDOGREL BISULFATE 75 MG/1
75 TABLET ORAL DAILY
Qty: 90 TABLET | Refills: 3 | Status: ON HOLD | OUTPATIENT
Start: 2020-08-18 | End: 2020-10-20 | Stop reason: HOSPADM

## 2020-08-18 RX ORDER — POTASSIUM CHLORIDE 20 MEQ/1
20 TABLET, EXTENDED RELEASE ORAL DAILY
Qty: 90 TABLET | Refills: 3 | Status: SHIPPED | OUTPATIENT
Start: 2020-08-18 | End: 2020-08-19

## 2020-08-18 ASSESSMENT — ENCOUNTER SYMPTOMS
NAUSEA: 0
SHORTNESS OF BREATH: 0
ABDOMINAL PAIN: 0
WHEEZING: 0
COLOR CHANGE: 0
APNEA: 0
ABDOMINAL DISTENTION: 0
CHEST TIGHTNESS: 0
COUGH: 0

## 2020-08-18 NOTE — PATIENT INSTRUCTIONS
You may receive a survey regarding the care you received during your visit. Your input is valuable to us. We encourage you to complete and return your survey. We hope you will choose us in the future for your healthcare needs. Continue:  · Take all medications as prescribed   · Daily weights and record - please try to weigh yourself upon awakening before eating or drinking   · Fluid restriction of 2 Liters per day (64 oz)  · Limit sodium in diet to around 7452-3338 mg/day  · Monitor BP - take BP 1 hour after meds  · Increase activity as tolerated     Call the Heart Failure Clinic for any of the following symptoms: [phone]   Weight gain of 3 pounds in 1 day or 5 pounds in 1 week   Increased shortness of breath   Shortness of breath while laying down   Cough   Chest pain   Swelling in feet, ankles or legs   Tenderness or bloating in the abdomen   Fatigue    Decreased appetite or feeling \"full\"    Nausea    Confusion     **PLEASE bring all medications in original bottles with you to your next appointment**    Educational websites:    http://[email or web address].tv.Veruta/. org (American Heart Association)    [employer]otionGlownet. com (Entrest[employer]/Novartis)    [employer].com (CardioMEMS)    Text LEARN to 7-403-Stqzo-34 (6-436.241.9821)  -- to learn more about CardioMEMS   By enrolling in this education series you are giving permission to check[employer] to contact you via SMS (text message). You can opt out of receiving this information by replying STOP at any time in the SMS correspondence that you  receive. To ensure your privacy, [employer] and its subcontractors will not sell or share your personal information  with any third parties except as required by law. [address] [facility], [address] [place] [address], Tel: 1 [phone]  [employer]. [employer]  Rx Only  Brief Summary: Prior to using these devices, please review the Instructions for Use for a complete listing of  indications, contraindications, warnings, precautions, potential adverse events and directions for use.  Indicates a trademark of the [employer] group [employer]. Bluetooth and Bluetooth logo are registered trademarks of [employer].  © 2020 [employer]. All Rights Reserved. [address] [facility]-0106369 v1.0  Item approved for [place] use only.

## 2020-08-18 NOTE — PROGRESS NOTES
Sophie       Visit Date: 8/18/2020  Cardiologist:  Dr. Sheri Shrestha  Primary Care Physician: Dr. Allison Talbert, APRN - CNP    Masoud Handy is a 54 y.o. male who presents today for:  Chief Complaint   Patient presents with   [facility] Check-Up       HPI: Obtained from patient and chart    Masoud Handy is a 54 y.o. male who presents to the office for a follow up visit in the heart failure clinic. Hx CAD CABG, HTN, A fib on Coumadin, DM, EF 40% grade 1 DD, ICD. Weight is down 15 pounds since his last OV in Feb 2020. His LE edema is improving. He is making good urine with the Bumex. He denies any CP or palpitations. He is still smoking anout 3/4 of a ppd. Sleeps in a bed. Denies orthopnea or PND.       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    Anxiety disorder     Arthritis     Atrial fibrillation ([address]     CAD (coronary artery disease) 3/25/2014    s/p CABG in may 2014    Cardiomyopathy ([address]     Chronic kidney disease     Depression     Diabetes mellitus ([address]     Drug abuse ([address]     hx of cacaine abuse, stopped abusing drugs in 2012    GERD (gastroesophageal reflux disease)     H/O cardiac catheterization 4/30/2014    Hyperlipidemia     Hypertension     Intradural extramedullary spinal tumor     Lumbar spine tumor     Medtronic dual icd      Neuromuscular disorder ([address]     Other disorders of kidney and ureter in diseases classified elsewhere     Paroxysmal atrial fibrillation ([facility] 7/22/2014    V tach ([facility]     V-tach [facility])     s/p ablation in dec 2014     Past Surgical History:   Procedure Laterality Date    CARDIAC CATHETERIZATION  3/20/14     [facility]    CARDIAC DEFIBRILLATOR PLACEMENT  10/13/2015    [employer] [place], MRI CONDITIONAL ICD    CORONARY ARTERY BYPASS GRAFT  5-9-14    3 bypass    EKG 12-LEAD  7/17/2015         OTHER SURGICAL HISTORY Left 10/21/14    Left Bursectomy, I & D Left Elbow - Dr. Jessica Singh LAMINECTOMY,>2 [facility] N/A 1/16/2018    LUMBAR AND SACRAL LAMINECTOMY, REMOVAL OF INTRASPINAL TUMORS performed by Ching Hernandez MD at [address] harvest from legs     Family History   Problem Relation Age of Onset    Diabetes Mother     High Blood Pressure Mother     Stroke Mother     Diabetes Father     Heart Disease Father     High Blood Pressure Father     Heart Disease Sister         CABG    Diabetes Maternal Grandmother     Diabetes Maternal Grandfather     Heart Disease Paternal Grandfather      Social History     Tobacco Use    Smoking status: Current Every Day Smoker     Packs/day: 1.00     Years: 32.00     Pack years: 32.00     Types: Cigarettes     Start date: 7/16/1980    Smokeless tobacco: Former User   Substance Use Topics    Alcohol use:  Yes     Alcohol/week: 0.0 standard drinks     Comment: occasional     Current Outpatient Medications   Medication Sig Dispense Refill    amiodarone (CORDARONE) 200 MG tablet Take 1 tablet by mouth daily 90 tablet 3    clopidogrel (PLAVIX) 75 MG tablet Take 1 tablet by mouth daily 90 tablet 3    potassium chloride (KLOR-CON M) 20 MEQ extended release tablet Take 1 tablet by mouth daily 90 tablet 3    amLODIPine (NORVASC) 5 MG tablet Take 1 tablet by mouth 2 times daily 180 tablet 3    metoprolol tartrate (LOPRESSOR) 25 MG tablet Take 1 tablet by mouth 2 times daily 180 tablet 3    ALPRAZolam (XANAX) 0.5 MG tablet color change. Neurological: Negative for dizziness, numbness and headaches. Flat affect   Psychiatric/Behavioral: Negative for agitation, confusion and sleep disturbance. The patient is not nervous/anxious. OBJECTIVE:   Today's Vitals:  BP (!) 154/88   Pulse 64   Ht 6' 2\" (1.88 m)   Wt 274 lb (124.3 kg)   SpO2 98%   BMI 35.18 kg/m²     Physical Exam  Vitals signs reviewed. Constitutional:       Appearance: He is well-developed. HENT:      Head: Normocephalic and atraumatic. Eyes:      Pupils: Pupils are equal, round, and reactive to light. Neck:      Musculoskeletal: Normal range of motion. Vascular: No JVD. Cardiovascular:      Rate and Rhythm: Normal rate and regular rhythm. Heart sounds: Normal heart sounds. No murmur. Comments: ICD  Pulmonary:      Effort: Pulmonary effort is normal. No respiratory distress. Breath sounds: No rales. Abdominal:      General: There is no distension. Palpations: Abdomen is soft. Tenderness: There is no abdominal tenderness. Musculoskeletal:         General: No tenderness. Right lower leg: Edema (trace to 1+) present. Left lower leg: Edema (trace to 1+) present. Skin:     General: Skin is warm and dry. Capillary Refill: Capillary refill takes less than 2 seconds. Neurological:      Mental Status: He is alert and oriented to person, place, and time. Psychiatric:         Mood and Affect: Mood normal.         Behavior: Behavior normal.         Wt Readings from Last 3 Encounters:   08/18/20 274 lb (124.3 kg)   06/23/20 274 lb (124.3 kg)   06/02/20 279 lb 1.6 oz (126.6 kg)     BP Readings from Last 3 Encounters:   08/18/20 (!) 154/88   06/23/20 (!) 152/82   06/02/20 (!) 160/92     Pulse Readings from Last 3 Encounters:   08/18/20 64   06/23/20 62   06/02/20 56     Body mass index is 35.18 kg/m². ECHO:   9/25/19  Summary   limited echo   Ejection fraction is visually estimated at 40%.    There was moderate global hypokinesis of the left ventricle. Left Ventricular size is Mildly increased . Signature      ----------------------------------------------------------------   Electronically signed by Louis Diaz MD (Interpreting   physician) on 09/25/2019 at 04:38 PM   ----------------------------------------------------------------      Findings      Mitral Valve   Structurally normal mitral valve. Aortic Valve   Structurally normal aortic valve. Aortic valve appears tricuspid. Tricuspid Valve   Tricuspid valve is structurally normal.      Pulmonic Valve   The pulmonic valve was not well visualized . Left Atrium   Mildly dilated left atrium. Left Ventricle   Ejection fraction is visually estimated at 40%. There was moderate global hypokinesis of the left ventricle. Left Ventricular size is Mildly increased . Pericardial Effusion   No evidence of any pericardial effusion.     Results reviewed:  BNP:   Lab Results   Component Value Date    PROBNP 3963.0 (H) 09/26/2019     CBC:   Lab Results   Component Value Date    WBC 7.6 09/25/2019    RBC 3.65 09/25/2019    RBC 4.75 11/03/2011    HGB 11.9 09/25/2019    HCT 36.9 09/25/2019     09/25/2019     CMP:    Lab Results   Component Value Date     06/22/2020    K 4.1 06/22/2020    K 4.1 02/03/2019     06/22/2020    CO2 21 06/22/2020    BUN 22 06/22/2020    CREATININE 1.6 06/22/2020    LABGLOM 45 06/22/2020    GLUCOSE 137 06/22/2020    CALCIUM 8.7 06/22/2020     Hepatic Function Panel:    Lab Results   Component Value Date    ALKPHOS 81 06/22/2020    ALT 38 06/22/2020    AST 42 06/22/2020    PROT 6.9 06/22/2020    BILITOT 0.4 06/22/2020    BILIDIR <0.2 05/18/2018    LABALBU 3.8 06/22/2020     Magnesium:    Lab Results   Component Value Date    MG 2.1 09/26/2019     PT/INR:    Lab Results   Component Value Date    INR 1.14 09/26/2019     Lipids:    Lab Results   Component Value Date    TRIG 151 09/25/2019    HDL 39 09/25/2019    LDLCALC 103 09/25/2019       ASSESSMENT AND PLAN:   The patient's condition/symptoms are Stable      Diagnosis Orders   1. CHF (congestive heart failure), NYHA class II, chronic, combined ([facility]     2. Essential hypertension  amLODIPine (NORVASC) 5 MG tablet   3. Ischemic cardiomyopathy     4. Medtronic dual icd      5. Paroxysmal atrial fibrillation (HCC)     6. Tobacco abuse     7. Coronary artery disease involving coronary bypass graft of native heart without angina pectoris  clopidogrel (PLAVIX) 75 MG tablet   8. Medication management  TSH with Reflex   -smoking cessation x 3-5 minutes. He is not interested in aides to help at this time     Plan:  · GDMT   · ACE/ARB/ARNi: Losartan 50 mg daily  · Beta Blocker: Metoprolol 25 mg bid  · Aldosterone antagonist: no  · Diuretic/Potassium: Bumex 2 mg daily, Potassium 20 mEq daily  · Hydralazine/Nitrate: Hydralazine 100 mg tid, Imdur 120 mg daily  · Other: Amlodipine 5 mg bid, Amiodarone, Coumadin, Plavix, Mexitil  · No signs of fluid overload. BMP 6/22/20 reviewed. Kidney function near his baseline. Appt made with Dr Che Martinez as he did not have one scheduled. TSH soon (on Amiodarone). · HF Zones reviewed. · Daily weights and record  · Fluid restriction of 2 Liters per day (64 oz)   · Limit sodium in diet to 2825-6986 mg/day  · Healthier food options were discussed   · Monitor BP daily 1 hour after meds are taken  · Increase activity as tolerated     Patient was instructed to call the [facility] for changes in the following symptoms:    Weight gain of 3 pounds in 1 day or 5 pounds in 1 week   Increased shortness of breath   Shortness of breath while laying down   Cough   Chest pain   Swelling in feet, ankles or legs   Tenderness or bloating in the abdomen   Fatigue    Decreased appetite or feeling \"full\"   Nausea    Confusion      Return in about 6 months (around 2/18/2021).  or sooner if needed     Patient given educational materials -

## 2020-08-19 RX ORDER — POTASSIUM CHLORIDE 20 MEQ/1
20 TABLET, EXTENDED RELEASE ORAL DAILY
Qty: 30 TABLET | Refills: 11 | Status: SHIPPED | OUTPATIENT
Start: 2020-08-19 | End: 2021-10-06 | Stop reason: SDUPTHER

## 2020-09-09 NOTE — PROGRESS NOTES
Spoke with patient regarding overdue labs  Patient states he will go have them done at [address]

## 2020-10-09 ENCOUNTER — TELEPHONE (OUTPATIENT)
Dept: CARDIOLOGY CLINIC | Age: 55
End: 2020-10-09

## 2020-10-12 ENCOUNTER — PROCEDURE VISIT (OUTPATIENT)
Dept: CARDIOLOGY CLINIC | Age: 55
End: 2020-10-12
Payer: COMMERCIAL

## 2020-10-12 PROCEDURE — 93296 REM INTERROG EVL PM/IDS: CPT | Performed by: NUCLEAR MEDICINE

## 2020-10-12 PROCEDURE — 93295 DEV INTERROG REMOTE 1/2/MLT: CPT | Performed by: NUCLEAR MEDICINE

## 2020-10-12 NOTE — PROGRESS NOTES
Medtronic dual icd  Battery 3.7years  A paced 1.6%  V Paced 0.1%  p wave 1.9mV  r wave 12. 1mV  Ventricle threshold 0.625V @ 0.4ms  Atrial impedance 437 ohms  Ventricle impedance 342 ohms   Shock 59 ohms

## 2020-10-19 ENCOUNTER — APPOINTMENT (OUTPATIENT)
Dept: GENERAL RADIOLOGY | Age: 55
DRG: 246 | End: 2020-10-19
Payer: COMMERCIAL

## 2020-10-19 ENCOUNTER — HOSPITAL ENCOUNTER (INPATIENT)
Age: 55
LOS: 1 days | Discharge: HOME | DRG: 246 | End: 2020-10-20
Attending: EMERGENCY MEDICINE | Admitting: INTERNAL MEDICINE
Payer: COMMERCIAL

## 2020-10-19 PROBLEM — I21.3 STEMI (ST ELEVATION MYOCARDIAL INFARCTION) (HCC): Status: ACTIVE | Noted: 2020-10-19

## 2020-10-19 LAB
ACTIVATED CLOTTING TIME: 257 SECONDS (ref 1–150)
ALBUMIN SERPL-MCNC: 3.5 G/DL (ref 3.5–5.1)
ALP BLD-CCNC: 86 U/L (ref 38–126)
ALT SERPL-CCNC: 21 U/L (ref 11–66)
ANION GAP SERPL CALCULATED.3IONS-SCNC: 13 MEQ/L (ref 8–16)
ANION GAP SERPL CALCULATED.3IONS-SCNC: 9 MEQ/L (ref 8–16)
AST SERPL-CCNC: 27 U/L (ref 5–40)
BASOPHILS # BLD: 0.5 %
BASOPHILS # BLD: 0.8 %
BASOPHILS ABSOLUTE: 0.1 THOU/MM3 (ref 0–0.1)
BASOPHILS ABSOLUTE: 0.1 THOU/MM3 (ref 0–0.1)
BILIRUB SERPL-MCNC: 0.4 MG/DL (ref 0.3–1.2)
BILIRUBIN DIRECT: < 0.2 MG/DL (ref 0–0.3)
BUN BLDV-MCNC: 26 MG/DL (ref 7–22)
BUN BLDV-MCNC: 26 MG/DL (ref 7–22)
CALCIUM SERPL-MCNC: 8 MG/DL (ref 8.5–10.5)
CALCIUM SERPL-MCNC: 8.7 MG/DL (ref 8.5–10.5)
CHLORIDE BLD-SCNC: 105 MEQ/L (ref 98–111)
CHLORIDE BLD-SCNC: 106 MEQ/L (ref 98–111)
CHOLESTEROL, TOTAL: 221 MG/DL (ref 100–199)
CO2: 19 MEQ/L (ref 23–33)
CO2: 19 MEQ/L (ref 23–33)
CREAT SERPL-MCNC: 1.8 MG/DL (ref 0.4–1.2)
CREAT SERPL-MCNC: 1.9 MG/DL (ref 0.4–1.2)
EKG ATRIAL RATE: 104 BPM
EKG ATRIAL RATE: 202 BPM
EKG ATRIAL RATE: 73 BPM
EKG ATRIAL RATE: 97 BPM
EKG P AXIS: 62 DEGREES
EKG P AXIS: 62 DEGREES
EKG P AXIS: 67 DEGREES
EKG P-R INTERVAL: 180 MS
EKG P-R INTERVAL: 182 MS
EKG P-R INTERVAL: 196 MS
EKG Q-T INTERVAL: 276 MS
EKG Q-T INTERVAL: 354 MS
EKG Q-T INTERVAL: 378 MS
EKG Q-T INTERVAL: 424 MS
EKG QRS DURATION: 162 MS
EKG QRS DURATION: 86 MS
EKG QRS DURATION: 92 MS
EKG QRS DURATION: 94 MS
EKG QTC CALCULATION (BAZETT): 465 MS
EKG QTC CALCULATION (BAZETT): 467 MS
EKG QTC CALCULATION (BAZETT): 480 MS
EKG QTC CALCULATION (BAZETT): 505 MS
EKG R AXIS: -17 DEGREES
EKG R AXIS: -7 DEGREES
EKG R AXIS: 18 DEGREES
EKG R AXIS: 9 DEGREES
EKG T AXIS: -111 DEGREES
EKG T AXIS: 121 DEGREES
EKG T AXIS: 143 DEGREES
EKG T AXIS: 157 DEGREES
EKG VENTRICULAR RATE: 104 BPM
EKG VENTRICULAR RATE: 201 BPM
EKG VENTRICULAR RATE: 73 BPM
EKG VENTRICULAR RATE: 97 BPM
EOSINOPHIL # BLD: 0.7 %
EOSINOPHIL # BLD: 2.1 %
EOSINOPHILS ABSOLUTE: 0.1 THOU/MM3 (ref 0–0.4)
EOSINOPHILS ABSOLUTE: 0.2 THOU/MM3 (ref 0–0.4)
ERYTHROCYTE [DISTWIDTH] IN BLOOD BY AUTOMATED COUNT: 14.1 % (ref 11.5–14.5)
ERYTHROCYTE [DISTWIDTH] IN BLOOD BY AUTOMATED COUNT: 14.1 % (ref 11.5–14.5)
ERYTHROCYTE [DISTWIDTH] IN BLOOD BY AUTOMATED COUNT: 50.9 FL (ref 35–45)
ERYTHROCYTE [DISTWIDTH] IN BLOOD BY AUTOMATED COUNT: 52.1 FL (ref 35–45)
ETHYL ALCOHOL, SERUM: < 0.01 %
GFR SERPL CREATININE-BSD FRML MDRD: 37 ML/MIN/1.73M2
GFR SERPL CREATININE-BSD FRML MDRD: 39 ML/MIN/1.73M2
GLUCOSE BLD-MCNC: 144 MG/DL (ref 70–108)
GLUCOSE BLD-MCNC: 253 MG/DL (ref 70–108)
GLUCOSE BLD-MCNC: 275 MG/DL (ref 70–108)
GLUCOSE BLD-MCNC: 326 MG/DL (ref 70–108)
GLUCOSE BLD-MCNC: 330 MG/DL (ref 70–108)
HCT VFR BLD CALC: 40.7 % (ref 42–52)
HCT VFR BLD CALC: 45.9 % (ref 42–52)
HDLC SERPL-MCNC: 42 MG/DL
HEMOGLOBIN: 13.6 GM/DL (ref 14–18)
HEMOGLOBIN: 15.5 GM/DL (ref 14–18)
IMMATURE GRANS (ABS): 0.06 THOU/MM3 (ref 0–0.07)
IMMATURE GRANS (ABS): 0.06 THOU/MM3 (ref 0–0.07)
IMMATURE GRANULOCYTES: 0.6 %
IMMATURE GRANULOCYTES: 0.6 %
INR BLD: 1.78 (ref 0.85–1.13)
LDL CHOLESTEROL CALCULATED: 162 MG/DL
LIPASE: 82.6 U/L (ref 5.6–51.3)
LV EF: 33 %
LVEF MODALITY: NORMAL
LYMPHOCYTES # BLD: 10.9 %
LYMPHOCYTES # BLD: 6.3 %
LYMPHOCYTES ABSOLUTE: 0.6 THOU/MM3 (ref 1–4.8)
LYMPHOCYTES ABSOLUTE: 1 THOU/MM3 (ref 1–4.8)
MAGNESIUM: 2.3 MG/DL (ref 1.6–2.4)
MCH RBC QN AUTO: 33.6 PG (ref 26–33)
MCH RBC QN AUTO: 34.1 PG (ref 26–33)
MCHC RBC AUTO-ENTMCNC: 33.4 GM/DL (ref 32.2–35.5)
MCHC RBC AUTO-ENTMCNC: 33.8 GM/DL (ref 32.2–35.5)
MCV RBC AUTO: 100.5 FL (ref 80–94)
MCV RBC AUTO: 100.9 FL (ref 80–94)
MONOCYTES # BLD: 10.5 %
MONOCYTES # BLD: 9.9 %
MONOCYTES ABSOLUTE: 1 THOU/MM3 (ref 0.4–1.3)
MONOCYTES ABSOLUTE: 1 THOU/MM3 (ref 0.4–1.3)
NUCLEATED RED BLOOD CELLS: 0 /100 WBC
NUCLEATED RED BLOOD CELLS: 0 /100 WBC
OSMOLALITY CALCULATION: 293.1 MOSMOL/KG (ref 275–300)
PLATELET # BLD: 153 THOU/MM3 (ref 130–400)
PLATELET # BLD: 204 THOU/MM3 (ref 130–400)
PMV BLD AUTO: 10 FL (ref 9.4–12.4)
PMV BLD AUTO: 10.1 FL (ref 9.4–12.4)
POTASSIUM SERPL-SCNC: 4.3 MEQ/L (ref 3.5–5.2)
POTASSIUM SERPL-SCNC: 4.6 MEQ/L (ref 3.5–5.2)
PRO-BNP: 3353 PG/ML (ref 0–900)
RBC # BLD: 4.05 MILL/MM3 (ref 4.7–6.1)
RBC # BLD: 4.55 MILL/MM3 (ref 4.7–6.1)
SEG NEUTROPHILS: 75.1 %
SEG NEUTROPHILS: 82 %
SEGMENTED NEUTROPHILS ABSOLUTE COUNT: 7.1 THOU/MM3 (ref 1.8–7.7)
SEGMENTED NEUTROPHILS ABSOLUTE COUNT: 8.4 THOU/MM3 (ref 1.8–7.7)
SODIUM BLD-SCNC: 133 MEQ/L (ref 135–145)
SODIUM BLD-SCNC: 138 MEQ/L (ref 135–145)
TOTAL PROTEIN: 6.3 G/DL (ref 6.1–8)
TRIGL SERPL-MCNC: 85 MG/DL (ref 0–199)
TROPONIN T: 0.15 NG/ML
TSH SERPL DL<=0.05 MIU/L-ACNC: 2.68 UIU/ML (ref 0.4–4.2)
WBC # BLD: 10.2 THOU/MM3 (ref 4.8–10.8)
WBC # BLD: 9.5 THOU/MM3 (ref 4.8–10.8)

## 2020-10-19 PROCEDURE — 6360000002 HC RX W HCPCS: Performed by: EMERGENCY MEDICINE

## 2020-10-19 PROCEDURE — 2000000000 HC ICU R&B

## 2020-10-19 PROCEDURE — 6370000000 HC RX 637 (ALT 250 FOR IP)

## 2020-10-19 PROCEDURE — C1887 CATHETER, GUIDING: HCPCS

## 2020-10-19 PROCEDURE — B2181ZZ FLUOROSCOPY OF LEFT INTERNAL MAMMARY BYPASS GRAFT USING LOW OSMOLAR CONTRAST: ICD-10-PCS | Performed by: INTERNAL MEDICINE

## 2020-10-19 PROCEDURE — C1725 CATH, TRANSLUMIN NON-LASER: HCPCS

## 2020-10-19 PROCEDURE — 93005 ELECTROCARDIOGRAM TRACING: CPT | Performed by: INTERNAL MEDICINE

## 2020-10-19 PROCEDURE — 82948 REAGENT STRIP/BLOOD GLUCOSE: CPT

## 2020-10-19 PROCEDURE — 6370000000 HC RX 637 (ALT 250 FOR IP): Performed by: INTERNAL MEDICINE

## 2020-10-19 PROCEDURE — 2500000003 HC RX 250 WO HCPCS: Performed by: NURSE PRACTITIONER

## 2020-10-19 PROCEDURE — 6360000004 HC RX CONTRAST MEDICATION: Performed by: INTERNAL MEDICINE

## 2020-10-19 PROCEDURE — 2500000003 HC RX 250 WO HCPCS

## 2020-10-19 PROCEDURE — 85610 PROTHROMBIN TIME: CPT

## 2020-10-19 PROCEDURE — 85025 COMPLETE CBC W/AUTO DIFF WBC: CPT

## 2020-10-19 PROCEDURE — 96375 TX/PRO/DX INJ NEW DRUG ADDON: CPT

## 2020-10-19 PROCEDURE — 93005 ELECTROCARDIOGRAM TRACING: CPT | Performed by: EMERGENCY MEDICINE

## 2020-10-19 PROCEDURE — 93306 TTE W/DOPPLER COMPLETE: CPT

## 2020-10-19 PROCEDURE — 92929 HC PRQ CARD STENT W/ANGIO ADDL: CPT | Performed by: INTERNAL MEDICINE

## 2020-10-19 PROCEDURE — 2500000003 HC RX 250 WO HCPCS: Performed by: INTERNAL MEDICINE

## 2020-10-19 PROCEDURE — 80061 LIPID PANEL: CPT

## 2020-10-19 PROCEDURE — 2580000003 HC RX 258: Performed by: NURSE PRACTITIONER

## 2020-10-19 PROCEDURE — 84443 ASSAY THYROID STIM HORMONE: CPT

## 2020-10-19 PROCEDURE — 2580000003 HC RX 258: Performed by: EMERGENCY MEDICINE

## 2020-10-19 PROCEDURE — 71045 X-RAY EXAM CHEST 1 VIEW: CPT

## 2020-10-19 PROCEDURE — 36415 COLL VENOUS BLD VENIPUNCTURE: CPT

## 2020-10-19 PROCEDURE — C1769 GUIDE WIRE: HCPCS

## 2020-10-19 PROCEDURE — 93010 ELECTROCARDIOGRAM REPORT: CPT | Performed by: NUCLEAR MEDICINE

## 2020-10-19 PROCEDURE — B2111ZZ FLUOROSCOPY OF MULTIPLE CORONARY ARTERIES USING LOW OSMOLAR CONTRAST: ICD-10-PCS | Performed by: INTERNAL MEDICINE

## 2020-10-19 PROCEDURE — 92928 PRQ TCAT PLMT NTRAC ST 1 LES: CPT | Performed by: INTERNAL MEDICINE

## 2020-10-19 PROCEDURE — 92941 PRQ TRLML REVSC TOT OCCL AMI: CPT | Performed by: INTERNAL MEDICINE

## 2020-10-19 PROCEDURE — 83735 ASSAY OF MAGNESIUM: CPT

## 2020-10-19 PROCEDURE — 99285 EMERGENCY DEPT VISIT HI MDM: CPT

## 2020-10-19 PROCEDURE — 84484 ASSAY OF TROPONIN QUANT: CPT

## 2020-10-19 PROCEDURE — 80053 COMPREHEN METABOLIC PANEL: CPT

## 2020-10-19 PROCEDURE — 96374 THER/PROPH/DIAG INJ IV PUSH: CPT

## 2020-10-19 PROCEDURE — 99223 1ST HOSP IP/OBS HIGH 75: CPT | Performed by: INTERNAL MEDICINE

## 2020-10-19 PROCEDURE — 94761 N-INVAS EAR/PLS OXIMETRY MLT: CPT

## 2020-10-19 PROCEDURE — 6360000002 HC RX W HCPCS: Performed by: NURSE PRACTITIONER

## 2020-10-19 PROCEDURE — 83880 ASSAY OF NATRIURETIC PEPTIDE: CPT

## 2020-10-19 PROCEDURE — 99232 SBSQ HOSP IP/OBS MODERATE 35: CPT | Performed by: NURSE PRACTITIONER

## 2020-10-19 PROCEDURE — 93454 CORONARY ARTERY ANGIO S&I: CPT | Performed by: INTERNAL MEDICINE

## 2020-10-19 PROCEDURE — 6370000000 HC RX 637 (ALT 250 FOR IP): Performed by: NURSE PRACTITIONER

## 2020-10-19 PROCEDURE — 83690 ASSAY OF LIPASE: CPT

## 2020-10-19 PROCEDURE — 4A023N7 MEASUREMENT OF CARDIAC SAMPLING AND PRESSURE, LEFT HEART, PERCUTANEOUS APPROACH: ICD-10-PCS | Performed by: INTERNAL MEDICINE

## 2020-10-19 PROCEDURE — 6360000002 HC RX W HCPCS

## 2020-10-19 PROCEDURE — 93571 IV DOP VEL&/PRESS C FLO 1ST: CPT | Performed by: INTERNAL MEDICINE

## 2020-10-19 PROCEDURE — 82248 BILIRUBIN DIRECT: CPT

## 2020-10-19 PROCEDURE — B2131ZZ FLUOROSCOPY OF MULTIPLE CORONARY ARTERY BYPASS GRAFTS USING LOW OSMOLAR CONTRAST: ICD-10-PCS | Performed by: INTERNAL MEDICINE

## 2020-10-19 PROCEDURE — 92929 PR PRQ TRLUML CORONARY STENT W/ANGIO ADDL ART/BRNCH: CPT | Performed by: INTERNAL MEDICINE

## 2020-10-19 PROCEDURE — 5A2204Z RESTORATION OF CARDIAC RHYTHM, SINGLE: ICD-10-PCS | Performed by: EMERGENCY MEDICINE

## 2020-10-19 PROCEDURE — 93455 CORONARY ART/GRFT ANGIO S&I: CPT | Performed by: INTERNAL MEDICINE

## 2020-10-19 PROCEDURE — 4A033BC MEASUREMENT OF ARTERIAL PRESSURE, CORONARY, PERCUTANEOUS APPROACH: ICD-10-PCS | Performed by: INTERNAL MEDICINE

## 2020-10-19 PROCEDURE — C1874 STENT, COATED/COV W/DEL SYS: HCPCS

## 2020-10-19 PROCEDURE — 2709999900 HC NON-CHARGEABLE SUPPLY

## 2020-10-19 PROCEDURE — 2500000003 HC RX 250 WO HCPCS: Performed by: EMERGENCY MEDICINE

## 2020-10-19 PROCEDURE — 6370000000 HC RX 637 (ALT 250 FOR IP): Performed by: EMERGENCY MEDICINE

## 2020-10-19 PROCEDURE — 85347 COAGULATION TIME ACTIVATED: CPT

## 2020-10-19 PROCEDURE — C1894 INTRO/SHEATH, NON-LASER: HCPCS

## 2020-10-19 PROCEDURE — G0480 DRUG TEST DEF 1-7 CLASSES: HCPCS

## 2020-10-19 PROCEDURE — 027137Z DILATION OF CORONARY ARTERY, TWO ARTERIES WITH FOUR OR MORE DRUG-ELUTING INTRALUMINAL DEVICES, PERCUTANEOUS APPROACH: ICD-10-PCS | Performed by: INTERNAL MEDICINE

## 2020-10-19 PROCEDURE — 6360000002 HC RX W HCPCS: Performed by: INTERNAL MEDICINE

## 2020-10-19 RX ORDER — PHENOBARBITAL 32.4 MG/1
64.8 TABLET ORAL 2 TIMES DAILY
Status: DISCONTINUED | OUTPATIENT
Start: 2020-10-19 | End: 2020-10-20 | Stop reason: SDUPTHER

## 2020-10-19 RX ORDER — NITROGLYCERIN 20 MG/100ML
5 INJECTION INTRAVENOUS CONTINUOUS
Status: DISCONTINUED | OUTPATIENT
Start: 2020-10-19 | End: 2020-10-19

## 2020-10-19 RX ORDER — SODIUM CHLORIDE 0.9 % (FLUSH) 0.9 %
10 SYRINGE (ML) INJECTION EVERY 12 HOURS SCHEDULED
Status: DISCONTINUED | OUTPATIENT
Start: 2020-10-19 | End: 2020-10-19 | Stop reason: SDUPTHER

## 2020-10-19 RX ORDER — DEXTROSE MONOHYDRATE 50 MG/ML
100 INJECTION, SOLUTION INTRAVENOUS PRN
Status: DISCONTINUED | OUTPATIENT
Start: 2020-10-19 | End: 2020-10-20 | Stop reason: HOSPADM

## 2020-10-19 RX ORDER — LORAZEPAM 2 MG/ML
3 INJECTION INTRAMUSCULAR
Status: DISCONTINUED | OUTPATIENT
Start: 2020-10-19 | End: 2020-10-19 | Stop reason: ALTCHOICE

## 2020-10-19 RX ORDER — MEXILETINE HYDROCHLORIDE 150 MG/1
300 CAPSULE ORAL EVERY 8 HOURS SCHEDULED
Status: DISCONTINUED | OUTPATIENT
Start: 2020-10-19 | End: 2020-10-20 | Stop reason: HOSPADM

## 2020-10-19 RX ORDER — LORAZEPAM 2 MG/1
4 TABLET ORAL
Status: DISCONTINUED | OUTPATIENT
Start: 2020-10-19 | End: 2020-10-19 | Stop reason: ALTCHOICE

## 2020-10-19 RX ORDER — HYDROCODONE BITARTRATE AND ACETAMINOPHEN 5; 325 MG/1; MG/1
1 TABLET ORAL EVERY 6 HOURS PRN
Status: DISCONTINUED | OUTPATIENT
Start: 2020-10-19 | End: 2020-10-20 | Stop reason: HOSPADM

## 2020-10-19 RX ORDER — ATORVASTATIN CALCIUM 80 MG/1
80 TABLET, FILM COATED ORAL NIGHTLY
Status: DISCONTINUED | OUTPATIENT
Start: 2020-10-19 | End: 2020-10-20 | Stop reason: HOSPADM

## 2020-10-19 RX ORDER — LORAZEPAM 2 MG/ML
2 INJECTION INTRAMUSCULAR
Status: DISCONTINUED | OUTPATIENT
Start: 2020-10-19 | End: 2020-10-19 | Stop reason: ALTCHOICE

## 2020-10-19 RX ORDER — 0.9 % SODIUM CHLORIDE 0.9 %
1000 INTRAVENOUS SOLUTION INTRAVENOUS ONCE
Status: COMPLETED | OUTPATIENT
Start: 2020-10-19 | End: 2020-10-19

## 2020-10-19 RX ORDER — LORAZEPAM 1 MG/1
1 TABLET ORAL
Status: DISCONTINUED | OUTPATIENT
Start: 2020-10-19 | End: 2020-10-19 | Stop reason: ALTCHOICE

## 2020-10-19 RX ORDER — DEXTROSE MONOHYDRATE 25 G/50ML
12.5 INJECTION, SOLUTION INTRAVENOUS PRN
Status: DISCONTINUED | OUTPATIENT
Start: 2020-10-19 | End: 2020-10-20 | Stop reason: HOSPADM

## 2020-10-19 RX ORDER — ACETAMINOPHEN 325 MG/1
650 TABLET ORAL EVERY 4 HOURS PRN
Status: DISCONTINUED | OUTPATIENT
Start: 2020-10-19 | End: 2020-10-19

## 2020-10-19 RX ORDER — ACETAMINOPHEN 325 MG/1
650 TABLET ORAL EVERY 4 HOURS PRN
Status: DISCONTINUED | OUTPATIENT
Start: 2020-10-19 | End: 2020-10-20 | Stop reason: HOSPADM

## 2020-10-19 RX ORDER — MULTIVITAMIN WITH IRON
1 TABLET ORAL DAILY
Status: DISCONTINUED | OUTPATIENT
Start: 2020-10-19 | End: 2020-10-20 | Stop reason: HOSPADM

## 2020-10-19 RX ORDER — ASPIRIN 81 MG/1
324 TABLET, CHEWABLE ORAL ONCE
Status: COMPLETED | OUTPATIENT
Start: 2020-10-19 | End: 2020-10-19

## 2020-10-19 RX ORDER — ISOSORBIDE MONONITRATE 60 MG/1
120 TABLET, EXTENDED RELEASE ORAL DAILY
Status: DISCONTINUED | OUTPATIENT
Start: 2020-10-19 | End: 2020-10-20 | Stop reason: HOSPADM

## 2020-10-19 RX ORDER — HYDRALAZINE HYDROCHLORIDE 20 MG/ML
10 INJECTION INTRAMUSCULAR; INTRAVENOUS EVERY 10 MIN PRN
Status: COMPLETED | OUTPATIENT
Start: 2020-10-19 | End: 2020-10-19

## 2020-10-19 RX ORDER — PHENOBARBITAL 32.4 MG/1
32.4 TABLET ORAL 2 TIMES DAILY
Status: DISCONTINUED | OUTPATIENT
Start: 2020-10-20 | End: 2020-10-20 | Stop reason: SDUPTHER

## 2020-10-19 RX ORDER — LORAZEPAM 2 MG/ML
4 INJECTION INTRAMUSCULAR
Status: DISCONTINUED | OUTPATIENT
Start: 2020-10-19 | End: 2020-10-19 | Stop reason: ALTCHOICE

## 2020-10-19 RX ORDER — LORAZEPAM 2 MG/ML
1 INJECTION INTRAMUSCULAR
Status: DISCONTINUED | OUTPATIENT
Start: 2020-10-19 | End: 2020-10-19 | Stop reason: ALTCHOICE

## 2020-10-19 RX ORDER — HYDRALAZINE HYDROCHLORIDE 50 MG/1
100 TABLET, FILM COATED ORAL 3 TIMES DAILY
Status: DISCONTINUED | OUTPATIENT
Start: 2020-10-19 | End: 2020-10-20 | Stop reason: HOSPADM

## 2020-10-19 RX ORDER — LOSARTAN POTASSIUM 50 MG/1
50 TABLET ORAL DAILY
Status: DISCONTINUED | OUTPATIENT
Start: 2020-10-19 | End: 2020-10-20 | Stop reason: HOSPADM

## 2020-10-19 RX ORDER — MIDAZOLAM HYDROCHLORIDE 1 MG/ML
2 INJECTION INTRAMUSCULAR; INTRAVENOUS ONCE
Status: COMPLETED | OUTPATIENT
Start: 2020-10-19 | End: 2020-10-19

## 2020-10-19 RX ORDER — NICOTINE 21 MG/24HR
1 PATCH, TRANSDERMAL 24 HOURS TRANSDERMAL DAILY
Status: DISCONTINUED | OUTPATIENT
Start: 2020-10-19 | End: 2020-10-20 | Stop reason: HOSPADM

## 2020-10-19 RX ORDER — AMIODARONE HYDROCHLORIDE 200 MG/1
200 TABLET ORAL DAILY
Status: DISCONTINUED | OUTPATIENT
Start: 2020-10-19 | End: 2020-10-20 | Stop reason: HOSPADM

## 2020-10-19 RX ORDER — METOPROLOL TARTRATE 50 MG/1
25 TABLET, FILM COATED ORAL 2 TIMES DAILY
Status: DISCONTINUED | OUTPATIENT
Start: 2020-10-19 | End: 2020-10-20 | Stop reason: HOSPADM

## 2020-10-19 RX ORDER — AMLODIPINE BESYLATE 5 MG/1
5 TABLET ORAL 2 TIMES DAILY
Status: DISCONTINUED | OUTPATIENT
Start: 2020-10-19 | End: 2020-10-20 | Stop reason: HOSPADM

## 2020-10-19 RX ORDER — LORAZEPAM 2 MG/1
2 TABLET ORAL
Status: DISCONTINUED | OUTPATIENT
Start: 2020-10-19 | End: 2020-10-19 | Stop reason: ALTCHOICE

## 2020-10-19 RX ORDER — SODIUM CHLORIDE 0.9 % (FLUSH) 0.9 %
10 SYRINGE (ML) INJECTION EVERY 12 HOURS SCHEDULED
Status: DISCONTINUED | OUTPATIENT
Start: 2020-10-19 | End: 2020-10-20 | Stop reason: HOSPADM

## 2020-10-19 RX ORDER — SODIUM CHLORIDE 0.9 % (FLUSH) 0.9 %
10 SYRINGE (ML) INJECTION PRN
Status: DISCONTINUED | OUTPATIENT
Start: 2020-10-19 | End: 2020-10-20 | Stop reason: HOSPADM

## 2020-10-19 RX ORDER — SODIUM CHLORIDE 0.9 % (FLUSH) 0.9 %
10 SYRINGE (ML) INJECTION PRN
Status: DISCONTINUED | OUTPATIENT
Start: 2020-10-19 | End: 2020-10-19 | Stop reason: SDUPTHER

## 2020-10-19 RX ORDER — THIAMINE MONONITRATE (VIT B1) 100 MG
100 TABLET ORAL DAILY
Status: DISCONTINUED | OUTPATIENT
Start: 2020-10-19 | End: 2020-10-20 | Stop reason: HOSPADM

## 2020-10-19 RX ORDER — NICOTINE POLACRILEX 4 MG
15 LOZENGE BUCCAL PRN
Status: DISCONTINUED | OUTPATIENT
Start: 2020-10-19 | End: 2020-10-20 | Stop reason: HOSPADM

## 2020-10-19 RX ORDER — ASPIRIN 81 MG/1
81 TABLET, CHEWABLE ORAL DAILY
Status: DISCONTINUED | OUTPATIENT
Start: 2020-10-20 | End: 2020-10-20 | Stop reason: HOSPADM

## 2020-10-19 RX ORDER — ALPRAZOLAM 0.5 MG/1
0.5 TABLET ORAL 2 TIMES DAILY PRN
Status: DISCONTINUED | OUTPATIENT
Start: 2020-10-19 | End: 2020-10-20 | Stop reason: HOSPADM

## 2020-10-19 RX ORDER — HEPARIN SODIUM 1000 [USP'U]/ML
5000 INJECTION, SOLUTION INTRAVENOUS; SUBCUTANEOUS ONCE
Status: COMPLETED | OUTPATIENT
Start: 2020-10-19 | End: 2020-10-19

## 2020-10-19 RX ADMIN — THERA TABS 1 TABLET: TAB at 10:50

## 2020-10-19 RX ADMIN — MIDAZOLAM HYDROCHLORIDE 2 MG: 1 INJECTION, SOLUTION INTRAMUSCULAR; INTRAVENOUS at 00:24

## 2020-10-19 RX ADMIN — AMLODIPINE BESYLATE 5 MG: 5 TABLET ORAL at 06:44

## 2020-10-19 RX ADMIN — TICAGRELOR 90 MG: 90 TABLET ORAL at 10:50

## 2020-10-19 RX ADMIN — HYDRALAZINE HYDROCHLORIDE 100 MG: 50 TABLET ORAL at 04:54

## 2020-10-19 RX ADMIN — HYDRALAZINE HYDROCHLORIDE 10 MG: 20 INJECTION, SOLUTION INTRAMUSCULAR; INTRAVENOUS at 03:27

## 2020-10-19 RX ADMIN — HYDRALAZINE HYDROCHLORIDE 10 MG: 20 INJECTION, SOLUTION INTRAMUSCULAR; INTRAVENOUS at 03:39

## 2020-10-19 RX ADMIN — ATORVASTATIN CALCIUM 80 MG: 80 TABLET, FILM COATED ORAL at 19:48

## 2020-10-19 RX ADMIN — MEXILETINE HYDROCHLORIDE 300 MG: 150 CAPSULE ORAL at 04:35

## 2020-10-19 RX ADMIN — IOPAMIDOL 400 ML: 755 INJECTION, SOLUTION INTRAVENOUS at 02:55

## 2020-10-19 RX ADMIN — METOPROLOL TARTRATE 25 MG: 50 TABLET, FILM COATED ORAL at 19:47

## 2020-10-19 RX ADMIN — MEXILETINE HYDROCHLORIDE 300 MG: 150 CAPSULE ORAL at 16:04

## 2020-10-19 RX ADMIN — HEPARIN SODIUM 5000 UNITS: 1000 INJECTION INTRAVENOUS; SUBCUTANEOUS at 00:49

## 2020-10-19 RX ADMIN — PHENOBARBITAL SODIUM 328.9 MG: 65 INJECTION INTRAMUSCULAR; INTRAVENOUS at 10:38

## 2020-10-19 RX ADMIN — INSULIN LISPRO 6 UNITS: 100 INJECTION, SOLUTION INTRAVENOUS; SUBCUTANEOUS at 17:14

## 2020-10-19 RX ADMIN — ENOXAPARIN SODIUM 40 MG: 40 INJECTION SUBCUTANEOUS at 17:13

## 2020-10-19 RX ADMIN — AMIODARONE HYDROCHLORIDE 0.5 MG/MIN: 1.8 INJECTION, SOLUTION INTRAVENOUS at 00:43

## 2020-10-19 RX ADMIN — DEXTROSE MONOHYDRATE 5 MG/HR: 50 INJECTION, SOLUTION INTRAVENOUS at 10:38

## 2020-10-19 RX ADMIN — HYDRALAZINE HYDROCHLORIDE 100 MG: 50 TABLET ORAL at 16:04

## 2020-10-19 RX ADMIN — DEXTROSE MONOHYDRATE 10 MG/HR: 50 INJECTION, SOLUTION INTRAVENOUS at 13:31

## 2020-10-19 RX ADMIN — ISOSORBIDE MONONITRATE 120 MG: 60 TABLET, EXTENDED RELEASE ORAL at 04:41

## 2020-10-19 RX ADMIN — NITROGLYCERIN 40 MCG/MIN: 20 INJECTION INTRAVENOUS at 02:50

## 2020-10-19 RX ADMIN — ACETAMINOPHEN 650 MG: 325 TABLET ORAL at 05:05

## 2020-10-19 RX ADMIN — TICAGRELOR 90 MG: 90 TABLET ORAL at 19:48

## 2020-10-19 RX ADMIN — Medication 10 ML: at 19:48

## 2020-10-19 RX ADMIN — DEXTROSE MONOHYDRATE 10 MG/HR: 50 INJECTION, SOLUTION INTRAVENOUS at 16:16

## 2020-10-19 RX ADMIN — ALPRAZOLAM 0.5 MG: 0.5 TABLET ORAL at 18:34

## 2020-10-19 RX ADMIN — SODIUM CHLORIDE 1000 ML: 9 INJECTION, SOLUTION INTRAVENOUS at 00:30

## 2020-10-19 RX ADMIN — DEXTROSE MONOHYDRATE 10 MG/HR: 50 INJECTION, SOLUTION INTRAVENOUS at 22:22

## 2020-10-19 RX ADMIN — PHENOBARBITAL SODIUM 328.9 MG: 65 INJECTION INTRAMUSCULAR; INTRAVENOUS at 16:42

## 2020-10-19 RX ADMIN — AMIODARONE HYDROCHLORIDE 200 MG: 200 TABLET ORAL at 10:49

## 2020-10-19 RX ADMIN — METOPROLOL TARTRATE 25 MG: 50 TABLET, FILM COATED ORAL at 04:42

## 2020-10-19 RX ADMIN — Medication 100 MG: at 10:50

## 2020-10-19 RX ADMIN — ASPIRIN 81 MG 324 MG: 81 TABLET ORAL at 00:47

## 2020-10-19 RX ADMIN — LOSARTAN POTASSIUM 50 MG: 50 TABLET, FILM COATED ORAL at 10:50

## 2020-10-19 RX ADMIN — DEXTROSE MONOHYDRATE 10 MG/HR: 50 INJECTION, SOLUTION INTRAVENOUS at 19:26

## 2020-10-19 RX ADMIN — HYDROCODONE BITARTRATE AND ACETAMINOPHEN 1 TABLET: 5; 325 TABLET ORAL at 08:44

## 2020-10-19 ASSESSMENT — PAIN DESCRIPTION - PAIN TYPE: TYPE: ACUTE PAIN

## 2020-10-19 ASSESSMENT — ENCOUNTER SYMPTOMS
CHEST TIGHTNESS: 0
RHINORRHEA: 0
VOMITING: 0
TROUBLE SWALLOWING: 0
COUGH: 0
SORE THROAT: 0
NAUSEA: 0
EYE DISCHARGE: 0
ABDOMINAL DISTENTION: 0
SHORTNESS OF BREATH: 1
PHOTOPHOBIA: 0
VOICE CHANGE: 0
ABDOMINAL PAIN: 0
WHEEZING: 0
CONSTIPATION: 0
EYE REDNESS: 0
CHOKING: 0
EYE ITCHING: 0
EYE PAIN: 0
SINUS PRESSURE: 0
BLOOD IN STOOL: 0
DIARRHEA: 0
BACK PAIN: 0

## 2020-10-19 ASSESSMENT — PAIN SCALES - GENERAL
PAINLEVEL_OUTOF10: 2
PAINLEVEL_OUTOF10: 10

## 2020-10-19 ASSESSMENT — PAIN DESCRIPTION - LOCATION: LOCATION: HEAD

## 2020-10-19 NOTE — CARE COORDINATION
DISCHARGE/PLANNING EVALUATION  10/19/20, 2:39 PM EDT    Reason for Referral: Consideration of rehab. Mental Status: alert and oriented. Decision Making: makes own decisions. Family/Social/Home Environment: Assessment completed with pt, states he lives alone and is totally independent, pt drives, works full time, etc. Pt states he has 2 daughters available to help him if needed. Pt states he plans to return to work ASAP and declines needing services post discharge. Current Services including food security, transportation and housekeeping:  See note above. Current Equipment: none reported. Payment Source: BC/BS  Concerns or Barriers to Discharge: pt denies. Post acute provider list with quality measures, geographic area and applicable managed care information provided. Questions regarding selection process answered: declined. Teach Back Method used with pt regarding care plan and discharge planning. Patient verbalized understanding of the plan of care and contribute to goal setting. Patient goals, treatment preferences and discharge plan: pt planning home alone as PTA. Declined services,  HH or dme.      Electronically signed by MICAH Blackburn on 10/19/2020 at 2:39 PM

## 2020-10-19 NOTE — PRE SEDATION
[address] [address]  Sedation/Analgesia History & Physical    Pt Name: Rakesh Fowler  Account number: [de-identified]  MRN: [id number]  YOB: 1965  Provider Performing Procedure: Denver Hakim MD  Referring Provider: Nuzhat Martinez DO   Primary Care Physician: Jj Saenz, APRN - CNP  Date: 10/19/2020    PRE-PROCEDURE    Code Status: FULL CODE  Brief History/Pre-Procedure Diagnosis:   CP  VT requiring external shock  Hx of ICD  ICM  Post shock aVR elevation with diffuse ST-depressions     Consent: : I have discussed with the patient risks, benefits, and alternatives (and relevant risks, benefits, and side effects related to alternatives or not receiving care), and likelihood of the success. The patient and/or representative appear to understand and agree to proceed. The discussion encompasses risks, benefits, and side effects related to the alternatives and the risks related to not receiving the proposed care, treatment, and services. The indication, risks and benefits of the procedure and possible therapeutic consequences and alternatives were discussed with the patient. The patient was given the opportunity to ask questions and to have them answered to his/her satisfaction. Risks of the procedure include but are not limited to the following: Bleeding, hematoma including retroperitoneal hemmorhage, infection, pain and discomfort, injury to the aorta and other blood vessels, rhythm disturbance, low blood pressure, myocardial infarction, stroke, kidney damage/failure, myocardial perforation, allergic reactions to sedatives/contrast material, loss of pulse/vascular compromise requiring surgery, aneurysm/pseudoaneurysm formation, possible loss of a limb/hand/leg, needing blood transfusion, requiring emergent open heart surgery or emergent coronary intervention, the need for intubation/respiratory support, the requirement for defibrillation/cardioversion, and death.  Alternatives to and omission of the suggested procedure were discussed. The patient had no further questions and wished to proceed; the consent form was signed. MEDICAL HISTORY  [x]ASHD/ANGINA/MI/CHF   [x]Hypertension  [x]Diabetes  [x]Hyperlipidemia  []Smoking  []Family Hx of ASHD  [x]Additional information:       has a past medical history of Acute systolic CHF (congestive heart failure) ([address] Alcohol abuse, Anomalous origin of right coronary artery, Anxiety disorder, Arthritis, Atrial fibrillation ([address] CAD (coronary artery disease), Cardiomyopathy ([address] Chronic kidney disease, Depression, Diabetes mellitus ([address] Drug abuse ([address] GERD (gastroesophageal reflux disease), H/O cardiac catheterization, Hyperlipidemia, Hypertension, Intradural extramedullary spinal tumor, Lumbar spine tumor, Medtronic dual icd , Neuromuscular disorder ([address] Other disorders of kidney and ureter in diseases classified elsewhere, Paroxysmal atrial fibrillation ([address] [facility] ([facility] and V-tach ([facility] SURGICAL HISTORY   has a past surgical history that includes Cardiac catheterization (3/20/14 ); Coronary artery bypass graft (5-9-14); other surgical history (Left, 10/21/14); EKG 12 Lead (7/17/2015); Cardiac defibrillator placement (10/13/2015); vascular surgery; pacemaker placement; and pr laminectomy,>2 sgmt,lumbar (N/A, 1/16/2018).   Additional information:       ALLERGIES   Allergies as of 10/19/2020 - Review Complete 10/19/2020   Allergen Reaction Noted    Pcn [penicillins] Hives 06/01/2013    Zoloft [sertraline hcl] Anxiety 10/06/2015     Additional information:       MEDICATIONS   Aspirin  [] 81 mg  [] 325 mg  [x] None  Antiplatelet drug therapy use last 5 days  [x]No []Yes  Coumadin Use Last 5 Days []No [x]Yes  Other anticoagulant use last 5 days  [x]No []Yes    Current Facility-Administered Medications:     amiodarone (NEXTERONE) 360 mg in dextrose 5% 200 ml, 0.5 mg/min, Intravenous, Continuous, Romeo Iglesias DO, Last Rate: 16.7 mL/hr at 10/19/20 0043, 0.5 mg/min at 10/19/20 0043    0.9 % sodium chloride bolus, 1,000 mL, Intravenous, Once, Erica Primrose, DO, Last Rate: 1,000 mL/hr at 10/19/20 0030, 1,000 mL at 10/19/20 0030    Current Outpatient Medications:     linagliptin (TRADJENTA) 5 MG tablet, TAKE 1 TABLET BY MOUTH DAILY, Disp: 90 tablet, Rfl: 3    potassium chloride (KLOR-CON M) 20 MEQ extended release tablet, TAKE 1 TABLET BY MOUTH DAILY, Disp: 30 tablet, Rfl: 11    amiodarone (CORDARONE) 200 MG tablet, Take 1 tablet by mouth daily, Disp: 90 tablet, Rfl: 3    clopidogrel (PLAVIX) 75 MG tablet, Take 1 tablet by mouth daily, Disp: 90 tablet, Rfl: 3    amLODIPine (NORVASC) 5 MG tablet, Take 1 tablet by mouth 2 times daily, Disp: 180 tablet, Rfl: 3    metoprolol tartrate (LOPRESSOR) 25 MG tablet, Take 1 tablet by mouth 2 times daily, Disp: 180 tablet, Rfl: 3    ALPRAZolam (XANAX) 0.5 MG tablet, TAKE 1 TABLET BY MOUTH AT BEDTIME FOR ANXIETY, Disp: 30 tablet, Rfl: 5    losartan (COZAAR) 50 MG tablet, Take 1 tablet by mouth daily, Disp: 90 tablet, Rfl: 1    mexiletine (MEXITIL) 150 MG capsule, TAKE 2 CAPSULES THREE TIMES DAILY FOR ATRIAL FIBRILLATION, Disp: 540 capsule, Rfl: 1    metFORMIN (GLUCOPHAGE) 500 MG tablet, Take 1 tablet by mouth 2 times daily (with meals), Disp: 180 tablet, Rfl: 3    bumetanide (BUMEX) 2 MG tablet, Take 1 tablet by mouth daily, Disp: 90 tablet, Rfl: 3    isosorbide mononitrate (IMDUR) 120 MG extended release tablet, Take 1 tablet by mouth daily, Disp: 90 tablet, Rfl: 3    hydrALAZINE (APRESOLINE) 100 MG tablet, Take 1 tablet by mouth 3 times daily, Disp: 270 tablet, Rfl: 3    warfarin (COUMADIN) 5 MG tablet, USE AS DIRECTED BY COUMADIN CLINIC (Patient taking differently: Dosed per [facility]), Disp: 180 tablet, Rfl: 5    nitroGLYCERIN (NITROSTAT) 0.4 MG SL tablet, Place 1 tablet under the tongue every 5 minutes as needed for Chest pain X 3 doses.  If chest pain continues seek medical attention, Disp: 25 ISABELLA Germain CNP   nitroGLYCERIN (NITROSTAT) 0.4 MG SL tablet Place 1 tablet under the tongue every 5 minutes as needed for Chest pain X 3 doses. If chest pain continues seek medical attention 3/14/18   ISABELLA Gallo CNP   glucose blood VI test strips (PHIL CONTOUR TEST) strip 1 each by In Vitro route daily 1/5/17   ISABELLA Germain CNP   acetaminophen 650 MG TABS Take 650 mg by mouth every 4 hours as needed 1/22/16   Salma Roper MD     Additional information:       VITAL SIGNS   Vitals:    10/19/20 0103   BP: (!) 151/95   Pulse: 95   Resp: 14   Temp:    SpO2: 96%       PHYSICAL:   General: No acute distress  HEENT:  Unremarkable for age  Neck: without increased JVD, carotid pulses 2+ bilaterally without bruits  Heart: RRR, S1 & S2 WNL, S4 gallop, without murmurs or rubs   NYHA: 2  Lungs: Clear to auscultation    Abdomen: BS present, without HSM, masses, or tenderness    Extremities: without C,C,E.  Pulses 2+ bilaterally  Mental Status: Alert & Oriented        PLANNED PROCEDURE   [x]Cath  [x]PCI                []Pacemaker/AICD  []MYRIAM             []Cardioversion []Peripheral angiography/PTA  []Other:      SEDATION  Planned agent:[x]Midazolam []Meperidine [x]Sublimaze []Morphine  []Diazepam  []Other:     ASA Classification:  []1 []2 [x]3 []4 []5  Class 1: A normal healthy patient  Class 2: Pt with mild to moderate systemic disease  Class 3: Severe systemic disease or disturbance  Class 4: Severe systemic disorders that are already life threatening. Class 5: Moribund pt with little chances of survival, for more than 24 hours. Mallampati I Airway Classification:   []1 []2 [x]3 []4    [x]Pre-procedure diagnostic studies complete and results available. Comment:    [x]Previous sedation/anesthesia experiences assessed. Comment:    [x]The patient is an appropriate candidate to undergo the planned procedure sedation and anesthesia.  (Refer to nursing sedation/analgesia documentation record)  [x]Formulation and discussion of sedation/procedure plan, risks, and expectations with patient and/or responsible adult completed. [x]Patient examined immediately prior to the procedure.  (Refer to nursing sedation/analgesia documentation record)    Tg Cabrera MD   Electronically signed 10/19/2020 at 1:22 AM

## 2020-10-19 NOTE — PROGRESS NOTES
Inpatient Cardiac Rehabilitation Consult    Received consult for Phase II Cardiac Rehabilitation. Attempted to see pt this afternoon. Pt was resting. Will attempt to see pt tomorrow.

## 2020-10-19 NOTE — LETTER
[facility] [address]  [address] [address] [address]  Phone: [phone]             October 20, 2020    Patient: Wilene Fothergill   YOB: 1965   Date of Visit: 10/19/2020       To Whom It May Concern:    Emmy Greene was seen and treated in our facility  beginning 10/19/2020 until 10/20/2020. He may return to work on 10/27/2020.       Sincerely,       Josue Peguero RN         Signature:__________________________________

## 2020-10-19 NOTE — PROGRESS NOTES
Patient arrived to unit from cath lab via bed. Patient transferred to ICU bed and placed on continuous ICU bedside monitor. Patient admitted for STEMI (ST elevation myocardial infarction) ([facility] [I21.3]. Vitals obtained. See flowsheets. Patient's IV access includes R AC 20g L AC 18G and R fem arterial sheath. Current infusions and rates of infusion include nitro @ 40 . Assessment completed by Santana Corral RN. Unable to complete two person skin assessment d/t hemodynamic instability. . See flowsheets for assessment details. Policies and procedures of ICU able to be explained to patient at this time. Family member(s)/representative(s) present at time of admission include N/A. Patient rights explained to family member(s)/representatives and patient, as able. Patient/patient's family member(s)/representative(s) N/A to have physician notified of their admission. All questions posed by patient's family member(s)/representative(s) and patient answered at this time.

## 2020-10-19 NOTE — ED NOTES
2 mg Versed given IV by Hungneptali RN.  Dr. Lucy Malave at bedside giving verbal order      Sage Schmitz RN  10/19/20 [id number]

## 2020-10-19 NOTE — ED NOTES
Pt resting in bed pt alert. Pt clothing and belongings gathered and pt in bag.  Pt stated he did not want anyone notified at this time      Adrian Reveles, ALETA  10/19/20 [id number]

## 2020-10-19 NOTE — ED NOTES
Bed: 001A  Expected date: 10/19/20  Expected time: 12:10 AM  Means of arrival: [facility]  Comments:     Rosalina Stoll RN  10/19/20 0022

## 2020-10-19 NOTE — ED PROVIDER NOTES
[facility]  eMERGENCY dEPARTMENT eNCOUnter          CHIEF COMPLAINT       Chief Complaint   Patient presents with    Chest Pain    Excessive Sweating       Nurses Notes reviewed and I agree except as noted in the HPI. HISTORY OF PRESENT ILLNESS    Lennie Kennedy is a 54 y.o. male who presents chest pain shortness of breath and in ventricular tachycardia. The patient states it started approximately an hour prior to arrival.  He does admit to me that yesterday he drank excessively did use some prescription pain medications and some prescription benzodiazepines. He did drink again this morning. Patient states that he started sweating and he noticed that his heart was racing and that he started developing chest pain so is called the squad and then came in. Patient was seen here after the squad gave him a first dose of lidocaine. Patient will has decreased in his blood pressure and increased in his chest pain but he still remains in V. tach. At this point we made the decision to cardiovert the patient. At 150 J after 2 mg of Versed were given successful cardioversion was obtained. Patient states that he no longer has any chest pain. He does have a concerning EKG. We will call cardiology. REVIEW OF SYSTEMS     Review of Systems   Constitutional: Negative for activity change, appetite change, diaphoresis, fatigue and unexpected weight change. HENT: Negative for congestion, ear discharge, ear pain, hearing loss, rhinorrhea, sinus pressure, sore throat, trouble swallowing and voice change. Eyes: Negative for photophobia, pain, discharge, redness and itching. Respiratory: Positive for shortness of breath. Negative for cough, choking, chest tightness and wheezing. Cardiovascular: Positive for chest pain and palpitations. Negative for leg swelling. Gastrointestinal: Negative for abdominal distention, abdominal pain, blood in stool, constipation, diarrhea, nausea and vomiting. Endocrine: Negative for polydipsia, polyphagia and polyuria. Genitourinary: Negative for decreased urine volume, difficulty urinating, dysuria, enuresis, frequency, hematuria and urgency. Musculoskeletal: Negative for arthralgias, back pain, gait problem, myalgias, neck pain and neck stiffness. Skin: Negative for pallor and rash. Allergic/Immunologic: Negative for immunocompromised state. Neurological: Positive for headaches. Negative for dizziness, tremors, seizures, syncope, facial asymmetry, weakness, light-headedness and numbness. Hematological: Negative for adenopathy. Does not bruise/bleed easily. Psychiatric/Behavioral: Negative for agitation, hallucinations and suicidal ideas. The patient is not nervous/anxious. PAST MEDICAL HISTORY    has a past medical history of Acute systolic CHF (congestive heart failure) ([address] Alcohol abuse, Anomalous origin of right coronary artery, Anxiety disorder, Arthritis, Atrial fibrillation ([address] CAD (coronary artery disease), Cardiomyopathy ([address] Chronic kidney disease, Depression, Diabetes mellitus ([address] Drug abuse ([address] GERD (gastroesophageal reflux disease), H/O cardiac catheterization, Hyperlipidemia, Hypertension, Intradural extramedullary spinal tumor, Lumbar spine tumor, Medtronic dual icd , Neuromuscular disorder ([address] Other disorders of kidney and ureter in diseases classified elsewhere, Paroxysmal atrial fibrillation ([address] V tach ([address] and V-tach ([address] SURGICAL HISTORY      has a past surgical history that includes Cardiac catheterization (3/20/14 ); Coronary artery bypass graft (5-9-14); other surgical history (Left, 10/21/14); EKG 12 Lead (7/17/2015); Cardiac defibrillator placement (10/13/2015); vascular surgery; pacemaker placement; and pr laminectomy,>2 sgmt,lumbar (N/A, 1/16/2018).     CURRENT MEDICATIONS       Previous Medications    ACETAMINOPHEN 650 MG TABS    Take 650 mg by mouth every 4 hours as needed    ALPRAZOLAM (XANAX) 0.5 MG TABLET    TAKE 1 TABLET BY MOUTH AT BEDTIME FOR ANXIETY    AMIODARONE (CORDARONE) 200 MG TABLET    Take 1 tablet by mouth daily    AMLODIPINE (NORVASC) 5 MG TABLET    Take 1 tablet by mouth 2 times daily    BUMETANIDE (BUMEX) 2 MG TABLET    Take 1 tablet by mouth daily    CLOPIDOGREL (PLAVIX) 75 MG TABLET    Take 1 tablet by mouth daily    GLUCOSE BLOOD VI TEST STRIPS (PHIL CONTOUR TEST) STRIP    1 each by In Vitro route daily    HYDRALAZINE (APRESOLINE) 100 MG TABLET    Take 1 tablet by mouth 3 times daily    ISOSORBIDE MONONITRATE (IMDUR) 120 MG EXTENDED RELEASE TABLET    Take 1 tablet by mouth daily    LINAGLIPTIN (TRADJENTA) 5 MG TABLET    TAKE 1 TABLET BY MOUTH DAILY    LOSARTAN (COZAAR) 50 MG TABLET    Take 1 tablet by mouth daily    METFORMIN (GLUCOPHAGE) 500 MG TABLET    Take 1 tablet by mouth 2 times daily (with meals)    METOPROLOL TARTRATE (LOPRESSOR) 25 MG TABLET    Take 1 tablet by mouth 2 times daily    MEXILETINE (MEXITIL) 150 MG CAPSULE    TAKE 2 CAPSULES THREE TIMES DAILY FOR ATRIAL FIBRILLATION    NITROGLYCERIN (NITROSTAT) 0.4 MG SL TABLET    Place 1 tablet under the tongue every 5 minutes as needed for Chest pain X 3 doses. If chest pain continues seek medical attention    POTASSIUM CHLORIDE (KLOR-CON M) 20 MEQ EXTENDED RELEASE TABLET    TAKE 1 TABLET BY MOUTH DAILY    WARFARIN (COUMADIN) 5 MG TABLET    USE AS DIRECTED BY COUMADIN CLINIC       ALLERGIES     is allergic to pcn [penicillins] and zoloft [sertraline hcl]. FAMILY HISTORY     He indicated that his mother is . He indicated that his father is . He indicated that his sister is alive. He indicated that the status of his maternal grandmother is unknown. He indicated that the status of his maternal grandfather is unknown.  He indicated that the status of his paternal grandfather is unknown.   family history includes Diabetes in his father, maternal grandfather, maternal grandmother, and mother; Heart Disease in his father, paternal grandfather, and sister; High Blood Pressure in his father and mother; Stroke in his mother. SOCIAL HISTORY      reports that he has been smoking cigarettes. He started smoking about 40 years ago. He has a 32.00 pack-year smoking history. He has quit using smokeless tobacco. He reports current alcohol use. He reports that he does not use drugs. PHYSICAL EXAM     INITIAL VITALS:  height is 6' 2\" (1.88 m) and weight is 275 lb (124.7 kg). His oral temperature is 98.6 °F (37 °C). His blood pressure is 153/123 (abnormal) and his pulse is 100. His respiration is 17 and oxygen saturation is 98%. Physical Exam  Vitals signs and nursing note reviewed. Constitutional:       General: He is not in acute distress. Appearance: He is well-developed. He is not diaphoretic. HENT:      Head: Normocephalic and atraumatic. Right Ear: External ear normal.      Left Ear: External ear normal.      Nose: Nose normal.   Eyes:      General: Lids are normal. No scleral icterus. Right eye: No discharge. Left eye: No discharge. Conjunctiva/sclera: Conjunctivae normal.      Right eye: No exudate. Left eye: No exudate. Pupils: Pupils are equal, round, and reactive to light. Neck:      Musculoskeletal: Normal range of motion and neck supple. Normal range of motion. Thyroid: No thyromegaly. Vascular: No JVD. Trachea: No tracheal deviation. Cardiovascular:      Rate and Rhythm: Regular rhythm. Tachycardia present. Pulses: Normal pulses. Carotid pulses are 2+ on the right side and 2+ on the left side. Radial pulses are 2+ on the right side and 2+ on the left side. Femoral pulses are 2+ on the right side and 2+ on the left side. Popliteal pulses are 2+ on the right side and 2+ on the left side. Dorsalis pedis pulses are 2+ on the right side and 2+ on the left side.         Posterior tibial pulses are 2+ on the right side and 2+ on the left side. Heart sounds: Normal heart sounds, S1 normal and S2 normal. No murmur. No friction rub. No gallop. Comments: Ventricular rate of over 200. Pulmonary:      Effort: Pulmonary effort is normal. No respiratory distress. Breath sounds: Normal breath sounds. No stridor. No wheezing or rales. Chest:      Chest wall: No tenderness. Abdominal:      General: Bowel sounds are normal. There is no distension. Palpations: Abdomen is soft. There is no mass. Tenderness: There is no abdominal tenderness. There is no guarding or rebound. Musculoskeletal: Normal range of motion. General: No tenderness. Right shoulder: He exhibits no tenderness, no bony tenderness, no crepitus and normal strength. Right lower leg: No edema. Left lower leg: No edema. Lymphadenopathy:      Cervical: No cervical adenopathy. Skin:     General: Skin is warm and dry. Findings: No bruising, ecchymosis, lesion or rash. Neurological:      Mental Status: He is alert and oriented to person, place, and time. Cranial Nerves: No cranial nerve deficit. Sensory: No sensory deficit. Coordination: Coordination normal.      Deep Tendon Reflexes: Reflexes are normal and symmetric. Psychiatric:         Speech: Speech normal.         Behavior: Behavior normal.         Thought Content: Thought content normal.         Judgment: Judgment normal.           DIFFERENTIAL DIAGNOSIS:   Ventricular tachycardia, ACS, STEMI, NSTEMI    DIAGNOSTIC RESULTS     EKG: All EKG's are interpreted by the Emergency Department Physician who either signs or Co-signs this chart in the absence of a cardiologist.  Initial EKG revealed wide complex ventricular tachycardia ventricular rate of 201 QRS duration 162 QT interval 276 QTC of 505. Initial EKG after cardioversion showed depressions in leads I, 2, 3, aVL, V3, V4, V5, V6. There were elevations in aVR, and V1.   EKG was repeated approximately 7 minutes later consistently showing these changes. RADIOLOGY: non-plain film images(s) such as CT, Ultrasound and MRI are read by the radiologist.  XR CHEST PORTABLE    (Results Pending)         LABS:   Labs Reviewed   CBC WITH AUTO DIFFERENTIAL   BRAIN NATRIURETIC PEPTIDE   BASIC METABOLIC PANEL   HEPATIC FUNCTION PANEL   LIPASE   TROPONIN   MAGNESIUM   TSH WITHOUT REFLEX   ETHANOL       EMERGENCY DEPARTMENT COURSE:   Vitals:    Vitals:    10/19/20 0038 10/19/20 0039 10/19/20 0041 10/19/20 0052   BP: (!) 147/86   (!) 153/123   Pulse: 98   100   Resp: 17   17   Temp:   98.6 °F (37 °C)    TempSrc:   Oral    SpO2: 95%   98%   Weight:  275 lb (124.7 kg)     Height:  6' 2\" (1.88 m)       Patient was seen at bedside. Appropriate labs and imaging were ordered. Patient had already received lidocaine. He was cardioverted at bedside at 150 J successfully. EKG revealed what in my estimation appears to be an acute ST segment elevation MI at this point I called and spoke with Dr. Nathalia Sharma, discussed case with him and sent him images of the EKG. He agreed with my assessment and we called a STEMI alert. Patient was started on amiodarone to prevent converting back to a ventricular tachycardia. Patient was also given a heparin bolus and given aspirin and fluids. Appropriate labs were ordered. STEMI team has been called. Patient remains hemodynamically stable. CRITICAL CARE:   30 minutes not including time for procedures    CONSULTS:  Dr. Jay Yadav cardiology    PROCEDURES:  None    FINAL IMPRESSION      1. Ventricular tachycardia ([address]    2. ST elevation myocardial infarction (STEMI), unspecified artery ([facility]          DISPOSITION/PLAN   Admission    PATIENT REFERRED TO:  No follow-up provider specified.     DISCHARGE MEDICATIONS:  New Prescriptions    No medications on file       (Please note that portions of this note were completed with a voice recognition program.  Efforts were made to edit the dictations but occasionally words are mis-transcribed.)    Harsha Jo, [facility],   10/19/20 1087

## 2020-10-19 NOTE — PROGRESS NOTES
Cardiology Progress Note      Patient:  Jason Lyle  YOB: 1965  MRN: [id number]   Acct: [de-identified]  Admit Date:  10/19/2020  Primary Cardiologist: Dr. Vipul Botello  Seen by Dr. Romario Chandra    Per prior cardiology consult note-     CHIEF COMPLAINT:  CP/VT        HPI: This is a pleasant 54 y.o. male presents with acute onset of chest pain at rest, associated with diaphoresis, and sob. Felt his heart race. Came to ED, found to have VT requiring DCCV. Post ECG shows diffuse ST-depressions with aVR elevation. Had D-ICD, has not fired. 0 events seen on most recent interrogation. He is on Amiodarone and Mexiletine. He has hx of Afib on Coumadin. Compliant with medications. Takes Plavix as well. No bleeding. BP is stable. EF 40%, moderate LV hypokinesis. Cr 1.9, Trop 0.151. He notes he was drinking more alcohol that usual over the last 2 days. No smoking      Subjective (Events in last 24 hours):      This am pt with severe headache - noted nitro IV at 140 mcg/min - for BP control   /     Tele Stable sr   Pt states anxious - chronic issue but elevated at present     Pt states he drinks crown \"every now and then\" - but this weekend he \"hit it hard\"  Still smoking 3/4-1 pack day     RT groin with Art sheath in place -- no ecchymosis or hematoma - PPP- neurovascular check WNL       Objective:   BP (!) 172/96   Pulse 75   Temp 98.1 °F (36.7 °C) (Oral)   Resp 15   Ht 6' 2\" (1.88 m)   Wt 275 lb (124.7 kg)   SpO2 98%   BMI 35.31 kg/m²        TELEMETRY: SR no ectopy HR 60's    Physical Exam:  General Appearance: alert and oriented to person, place and time, in no acute distress  Cardiovascular: normal rate, regular rhythm, normal S1 and S2, no murmurs, rubs, clicks, or gallops, distal pulses intact,   Pulmonary/Chest: clear to auscultation bilaterally- no wheezes, rales or rhonchi, normal air movement, no respiratory distress  Abdomen: soft, non-tender, non-distended, normal bowel sounds, no masses Extremities: no cyanosis, clubbing or edema, pulses present   Skin: warm and dry, no rash or erythema   Musculoskeletal: normal range of motion, no joint swelling, deformity or tenderness  Neurological: alert, oriented, normal speech, no focal findings or movement disorder noted    Medications:    sodium chloride flush  10 mL Intravenous 2 times per day    [START ON 10/20/2020] aspirin  81 mg Oral Daily    ticagrelor  90 mg Oral BID    atorvastatin  80 mg Oral Nightly    amiodarone  200 mg Oral Daily    amLODIPine  5 mg Oral BID    hydrALAZINE  100 mg Oral TID    isosorbide mononitrate  120 mg Oral Daily    metoprolol tartrate  25 mg Oral BID    mexiletine  300 mg Oral 3 times per day      nitroGLYCERIN 140 mcg/min (10/19/20 0611)     sodium chloride flush, 10 mL, PRN  acetaminophen, 650 mg, Q4H PRN        Diagnostics:  EK-OCT-2020 03:46:01 [facility]-ICU ROUTINE RETRIEVAL  Sinus rhythm with occasional Premature ventricular complexes  ST & T wave abnormality, consider lateral ischemia  Prolonged QT interval or tu fusion, consider myocardial disease, electrolyte imbalance, or drug effects  Abnormal ECG  When compared with ECG of 19-OCT-2020 00:34,  Premature ventricular complexes are now Present  Criteria for Septal infarct are no longer Present  ST no longer depressed in Inferior leads  ST less depressed in Anterolateral leads  T wave inversion less evident in Lateral leads . .. Echo: pending   Electronically signed by Elvira Segal MD (Interpreting   physician) on 2019 at 04:38 PM   ----------------------------------------------------------------      Findings      Mitral Valve   Structurally normal mitral valve. Aortic Valve   Structurally normal aortic valve. Aortic valve appears tricuspid. Tricuspid Valve   Tricuspid valve is structurally normal.      Pulmonic Valve   The pulmonic valve was not well visualized . Left Atrium   Mildly dilated left atrium. Left Ventricle   Ejection fraction is visually estimated at 40%. There was moderate global hypokinesis of the left ventricle. Left Ventricular size is Mildly increased . Pericardial Effusion   No evidence of any pericardial effusion. Stress: Summary   This Nuclear Medicine study was abnormal .   inferior infarct with arlette infarct ischemia   abnormal LV function      Recommendation   Clinical correlation is recommended. Signatures      ----------------------------------------------------------------   Electronically signed by Gideon Hughes MD (Interpreting   Cardiologist) on 09/25/2019      Left Heart Cath:   Post-procedure Diagnosis/Findings:    SVG to Dx  - occluded  SVG to RCA -  Patent  LIMA to OM - patent  Native LAD 50% stenosis - FFR 0.76 - S/p 2 GENNARO  Large RI with 90% stenosis s/p 2 GENNARO            Native RCA with diffuse disease but patent with competitive flow                                                                                    TETO Styles MD  Electronically signed 10/19/2020 at 2:45 AM  Interventional Cardiology         Lab Data:    Cardiac Enzymes:  No results for input(s): CKTOTAL, CKMB, CKMBINDEX, TROPONINI in the last 72 hours.     CBC:   Lab Results   Component Value Date    WBC 10.2 10/19/2020    RBC 4.05 10/19/2020    RBC 4.75 11/03/2011    HGB 13.6 10/19/2020    HCT 40.7 10/19/2020     10/19/2020       CMP:    Lab Results   Component Value Date     10/19/2020    K 4.6 10/19/2020    K 4.1 02/03/2019     10/19/2020    CO2 19 10/19/2020    BUN 26 10/19/2020    CREATININE 1.8 10/19/2020    LABGLOM 39 10/19/2020    GLUCOSE 253 10/19/2020    CALCIUM 8.0 10/19/2020       Hepatic Function Panel:    Lab Results   Component Value Date    ALKPHOS 86 10/19/2020    ALT 21 10/19/2020    AST 27 10/19/2020    PROT 6.3 10/19/2020    BILITOT 0.4 10/19/2020    BILIDIR <0.2 10/19/2020    LABALBU 3.5 10/19/2020       Magnesium:    Lab Results   Component Value Date    MG 2.3 10/19/2020       PT/INR:    Lab Results   Component Value Date    INR 1.78 10/19/2020       HgBA1c:    Lab Results   Component Value Date    LABA1C 6.6 06/02/2020       FLP:    Lab Results   Component Value Date    TRIG 151 09/25/2019    HDL 39 09/25/2019    LDLCALC 103 09/25/2019       TSH:    Lab Results   Component Value Date    TSH 2.680 10/19/2020         Assessment:    Palpitations  / chest pain   Noted stable VT on arrival to ER - s\p DCCV    STEMI - lateral     S\p cardiac cath 10/19/2020:   SVG to Dx  - occluded  SVG to RCA -  Patent  LIMA to OM - patent  Native LAD 50% stenosis - FFR 0.76 - S/p 2 GENNARO  Large RI with 90% stenosis s/p 2 GENNARO            Native RCA with diffuse disease but patent with competitive flow                        Longstanding ICDMP EF 40% last echo - echo pending     Uncontrolled HTN -- off ARB at present     HLP  DM II -- HGB A1C 7.0 3/2020    CKD stage 3     Hx CABG LIMA to OM patent, SVG to Dx (occluded), SVG to RCA patent, 2014    Hx VT ablation 12/2014 -- on amiodarone and mextiline     Hx ETOH abuse - cessation discussed     Hx AFB - on coumadin / amiodarone         Plan:  · Stop iv nitro -- severe headache - if needed we will start cardene for BP   · Treat headache and anxiety   · CIWA protocol  · Hope to get ART sheath out today and transfer to floor   · Add back ACE   · Labs am - possible home am          Electronically signed by ISABELLA Romero CNP on 10/19/2020 at 8:24 AM

## 2020-10-19 NOTE — H&P
[facility]    Patient's Name/Date of Birth: Venita Delgadillo / 1965 (53 y.o.)    Date: October 19, 2020     Referring Provider: Queen Paul DO    CHIEF COMPLAINT:  CP/VT      HPI: This is a pleasant 54 y.o. male presents with acute onset of chest pain at rest, associated with diaphoresis, and sob. Felt his heart race. Came to ED, found to have VT requiring DCCV. Post ECG shows diffuse ST-depressions with aVR elevation. Had D-ICD, has not fired. 0 events seen on most recent interrogation. He is on Amiodarone and Mexiletine. He has hx of Afib on Coumadin. Compliant with medications. Takes Plavix as well. No bleeding. BP is stable. EF 40%, moderate LV hypokinesis. Cr 1.9, Trop 0.151. He notes he was drinking more alcohol that usual over the last 2 days. No smoking. Echo:   Results for orders placed during the hospital encounter of 02/03/19   Echocardiogram complete 2D with doppler with color    Narrative Transthoracic Echocardiography Report (TTE)     Demographics      Patient Name    Alma Zeng Gender                Male      MR #            [id number]     Race                                                      Ethnicity      Account #       [de-identified]     Room Number           0009      Accession       [id number]     Date of Study         02/04/2019   Number      Date of Birth   1965    Referring Physician   BON Rangel MD      Age             48 year(s)    Sonographer           Amberly Young YAN                                    Interpreting          Julio C Engel MD                                 Physician     Procedure    Type of Study      TTE procedure:ECHOCARDIOGRAM COMPLETE 2D W DOPPLER W COLOR.      Procedure Date  Date: 02/04/2019 Start: 08:50 AM    Study Location: Bedside  Technical Quality: Adequate visualization    Indications:Congestive heart failure. Additional Medical History:Smoker, pulmonary edema, CHF, CABG, CAD,  hyperlipidemia, Ventricular tachycardia, diabetic, hypertension, Ischemic  cardiomyopathy, ICD, alcohol withdrawl, cocaine abuse, osteoarthritis,  chronic kidney disease, remote MI    Patient Status: Routine    Height: 74.02 inches Weight: 266.76 pounds BSA: 2.46 m^2 BMI: 34.23 kg/m^2    BP: 168/90 mmHg    Allergies    - See Epic. Conclusions      Summary   Left ventricle size is normal.   Normal left ventricular wall thickness. Systolic function was moderately reduced. There was moderate global   hypokinesis of the left ventricle. Ejection fraction is visually estimated at 40-45%. Impaired relaxation compatible with diastolic dysfunction. The left atrium is Moderately dilated. The right ventricular size was normal with normal systolic function and   wall thickness. Pacer Wire visualized in right ventricle. Mild mitral regurgitation is present. Mild tricuspid regurgitation visualized. Signature      ----------------------------------------------------------------   Electronically signed by Gissel Lala MD (Interpreting   physician) on 02/04/2019 at 04:01 PM   ----------------------------------------------------------------      Findings      Mitral Valve   The mitral valve structure was normal with normal leaflet separation. DOPPLER: The transmitral velocity was within the normal range with no   evidence for mitral stenosis. Mild mitral regurgitation is present. Aortic Valve   The aortic valve was trileaflet with normal thickness and cuspal   separation. DOPPLER: Transaortic velocity was within the normal range with   no evidence of aortic stenosis. There was no evidence of aortic   regurgitation. Tricuspid Valve   The tricuspid valve structure was normal with normal leaflet separation. DOPPLER: There was no evidence of tricuspid stenosis. Mild tricuspid   regurgitation visualized.       Pulmonic Valve   The pulmonic valve leaflets exhibited normal thickness, no calcification,   and normal cuspal separation. DOPPLER: The transpulmonic velocity was   within the normal range with no evidence for regurgitation. Left Atrium   The left atrium is Moderately dilated. Left Ventricle   Left ventricle size is normal.   Normal left ventricular wall thickness. Systolic function was moderately reduced. There was moderate global   hypokinesis of the left ventricle. Ejection fraction is visually estimated at 40-45%. Impaired relaxation compatible with diastolic dysfunction. Right Atrium   Mildly enlarged right atrium size. Right Ventricle   The right ventricular size was normal with normal systolic function and   wall thickness. Pacer Wire visualized in right ventricle. Pericardial Effusion   The pericardium was normal in appearance with no evidence of a pericardial   effusion. Pleural Effusion   No evidence of pleural effusion. Aorta / Great Vessels   -Aortic root dimension within normal limits. -IVC size is within normal limits with normal respiratory phasic changes.      M-Mode/2D Measurements & Calculations      LV Diastolic    LV Systolic Dimension:    AV Cusp Separation: 2.1 cmLA   Dimension: 5.4  4.3 cm                    Dimension: 4.1 cmAO Root   cm              LV Volume Diastolic: 966  Dimension: 3.6 cmLA Area: 27   LV FS:20.4 %    ml                        cm^2   LV PW           LV Volume Systolic: 79.0   Diastolic: 1 cm ml   Septum          LV EDV/LV EDV Index: 283   Diastolic: 1 cm VM/09 J^2ME ESV/LV ESV    RV Diastolic Dimension: 3.8 cm                   Index: 83.1 ml/34 m^2                   EF Calculated: 41.1 %     LA/Aorta: 1.14      LV Area         LV Length: 10.2 cm        LA volume/Index: 96.3 ml /58G^8   Diastolic: 53   cm^2   LV Area   Systolic: 71.3   cm^2     Doppler Measurements & Calculations      MV Peak E-Wave: 124 cm/s  AV Peak Velocity: 173  LVOT Peak Velocity: 114   MV Peak A-Wave: 41.1 cm/s cm/s                   cm/s   MV E/A Ratio: 3.02        AV Peak Gradient:      LVOT Peak Gradient: 5   MV Peak Gradient: 6.15    11.97 mmHg             mmHg   mmHg                                                    TV Peak E-Wave: 38.2 cm/s   MV Deceleration Time: 190                        TV Peak A-Wave: 49.5 cm/s   msec                                                    TV Peak Gradient: 0.58                                                    mmHg   MV E' Septal Velocity:                           TR Velocity:284 cm/s   3.5 cm/s                  AV DVI (Vmax):0.66     TR Gradient:32.26 mmHg   MV A' Septal Velocity: 6                         PV Peak Velocity: 77.8   cm/s                                             cm/s   MV E' Lateral Velocity:                          PV Peak Gradient: 2.42   4.2 cm/s                                         mmHg   MV A' Lateral Velocity: 7   cm/s   E/E' septal: 35.43   E/E' lateral: 29.52   MR Velocity: 449 cm/s     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    Anxiety disorder     Arthritis     Atrial fibrillation ([address]     CAD (coronary artery disease) 3/25/2014    s/p CABG in may 2014    Cardiomyopathy ([address]     Chronic kidney disease     Depression     Diabetes mellitus ([address]     Drug abuse ([address]     hx of cacaine abuse, stopped abusing drugs in 2012    GERD (gastroesophageal reflux disease)     H/O cardiac catheterization 4/30/2014    Hyperlipidemia     Hypertension     Intradural extramedullary spinal tumor     Lumbar spine tumor     Medtronic dual icd      Neuromuscular disorder ([facility] [address]     Other disorders of kidney and ureter in diseases classified elsewhere     Paroxysmal atrial fibrillation ([facility] [address] 7/22/2014    V tach ([facility] [address]     V-tach [facility])     s/p ablation in dec 2014     Past Surgical History:   Procedure Laterality Date    CARDIAC CATHETERIZATION  3/20/14     [facility]    CARDIAC DEFIBRILLATOR PLACEMENT  10/13/2015    [employer] [place], MRI CONDITIONAL ICD    CORONARY ARTERY BYPASS GRAFT  5-9-14    3 bypass    EKG 12-LEAD  7/17/2015         OTHER SURGICAL HISTORY Left 10/21/14    Left Bursectomy, I & D Left Elbow - Dr. Paul Santos LAMINECTOMY,>2 [facility] N/A 1/16/2018    LUMBAR AND SACRAL LAMINECTOMY, REMOVAL OF INTRASPINAL TUMORS performed by Stacy Caicedo MD at [id number] harvest from legs     Current Facility-Administered Medications   Medication Dose Route Frequency Provider Last Rate Last Dose    amiodarone (NEXTERONE) 360 mg in dextrose 5% 200 ml  0.5 mg/min Intravenous Continuous [place] Balboa, DO 16.7 mL/hr at 10/19/20 0043 0.5 mg/min at 10/19/20 0043    0.9 % sodium chloride bolus  1,000 mL Intravenous Once [place] Balboa, DO 1,000 mL/hr at 10/19/20 0030 1,000 mL at 10/19/20 0030     Current Outpatient Medications   Medication Sig Dispense Refill    linagliptin (TRADJENTA) 5 MG tablet TAKE 1 TABLET BY MOUTH DAILY 90 tablet 3    potassium chloride (KLOR-CON M) 20 MEQ extended release tablet TAKE 1 TABLET BY MOUTH DAILY 30 tablet 11    amiodarone (CORDARONE) 200 MG tablet Take 1 tablet by mouth daily 90 tablet 3    clopidogrel (PLAVIX) 75 MG tablet Take 1 tablet by mouth daily 90 tablet 3    amLODIPine (NORVASC) 5 MG tablet Take 1 tablet by mouth 2 times daily 180 tablet 3    metoprolol tartrate (LOPRESSOR) 25 MG tablet Take 1 tablet by mouth 2 times daily 180 tablet 3    ALPRAZolam (XANAX) 0.5 MG tablet TAKE 1 TABLET BY MOUTH AT BEDTIME FOR ANXIETY 30 tablet 5    losartan (COZAAR) 50 MG tablet Take 1 tablet by mouth daily 90 tablet 1    mexiletine (MEXITIL) 150 MG capsule TAKE 2 CAPSULES THREE TIMES DAILY FOR ATRIAL FIBRILLATION 540 capsule 1    metFORMIN (GLUCOPHAGE) 500 MG tablet Take 1 tablet by mouth 2 times daily (with meals) 180 tablet 3    bumetanide (BUMEX) 2 MG tablet Take 1 tablet by mouth daily 90 tablet 3    isosorbide mononitrate (IMDUR) 120 MG extended release tablet Take 1 tablet by mouth daily 90 tablet 3    hydrALAZINE (APRESOLINE) 100 MG tablet Take 1 tablet by mouth 3 times daily 270 tablet 3    warfarin (COUMADIN) 5 MG tablet USE AS DIRECTED BY COUMADIN CLINIC (Patient taking differently: Dosed per [facility]) 180 tablet 5    nitroGLYCERIN (NITROSTAT) 0.4 MG SL tablet Place 1 tablet under the tongue every 5 minutes as needed for Chest pain X 3 doses. If chest pain continues seek medical attention 25 tablet 3    glucose blood VI test strips (PHIL CONTOUR TEST) strip 1 each by In Vitro route daily 300 each 3    acetaminophen 650 MG TABS Take 650 mg by mouth every 4 hours as needed 120 tablet 3     Prior to Admission medications    Medication Sig Start Date End Date Taking?  Authorizing Provider   linagliptin (TRADJENTA) 5 MG tablet TAKE 1 TABLET BY MOUTH DAILY 9/17/20   ISABELLA Germain CNP   potassium chloride (KLOR-CON M) 20 MEQ extended release tablet TAKE 1 TABLET BY MOUTH DAILY 8/19/20   ISABELLA Germain CNP   amiodarone (CORDARONE) 200 MG tablet Take 1 tablet by mouth daily 8/18/20   ISABELLA Gallo CNP   clopidogrel (PLAVIX) 75 MG tablet Take 1 tablet by mouth daily 8/18/20   ISABELLA Gallo CNP   amLODIPine (NORVASC) 5 MG tablet Take 1 tablet by mouth 2 times daily 8/18/20   ISABELLA Gallo CNP   metoprolol tartrate (LOPRESSOR) 25 MG tablet Take 1 tablet by mouth 2 times daily 8/18/20   ISABELLA Gallo CNP   ALPRAZolam (XANAX) 0.5 MG Grandfather     Heart Disease Paternal Grandfather      Social History     Socioeconomic History    Marital status:      Spouse name: Not on file    Number of children: 3    Years of education: 12    Highest education level: High school graduate   Occupational History     Employer: [employer]   Social Needs    Financial resource strain: Not hard at all   Almond-Dae insecurity     Worry: Never true     Inability: Never true    Transportation needs     Medical: No     Non-medical: No   Tobacco Use    Smoking status: Current Every Day Smoker     Packs/day: 1.00     Years: 32.00     Pack years: 32.00     Types: Cigarettes     Start date: 7/16/1980    Smokeless tobacco: Former User   Substance and Sexual Activity    Alcohol use: Yes     Alcohol/week: 0.0 standard drinks     Comment: occasional    Drug use: No    Sexual activity: Not Currently     Partners: Female   Lifestyle    Physical activity     Days per week: Not on file     Minutes per session: Not on file    Stress: Not on file   Relationships    Social connections     Talks on phone: Not on file     Gets together: Not on file     Attends Jainism service: Not on file     Active member of club or organization: Not on file     Attends meetings of clubs or organizations: Not on file     Relationship status: Not on file    Intimate partner violence     Fear of current or ex partner: Not on file     Emotionally abused: Not on file     Physically abused: Not on file     Forced sexual activity: Not on file   Other Topics Concern    Not on file   Social History Narrative    Not on file     ROS:   Constitutional: Denies any recent wt change. Eyes:  Denies any blurring or double vision, no glaucoma  Ears/Nose/Mouth/Throat:  Denies any chronic sinus/rhinitis, bleeding gums  Cardiovascular:  As described above.     Respiratory:  Denies any frequent cough, wheezing or coughing up blood  Genitourinary:  Denies difficulty with urination and kidney hours ending 10/19/20 0124   General:  No acute distress  Neck: Supple, no JVD, no carotid bruits  Heart: Regular rhythm normal S1 and S2, no rubs, murmurs or gallops  Lungs: clear to ascultation no rales, wheezes, or rhonchi  Abdomen: positive bowel sounds, soft, non-tender, non-distended, no bruits, no masses  Extremities:no clubbing, cyanosis or edema  Neurologic: alert and oriented x 3, cranial nerves 2-12 grossly intact, motor and sensory intact, moving all extremities  Skin: No rashes    Assessment:  VT  Diffuse ST-depressions with aVR elevation - suggestive of proximal left system disease  EF 40%  Hx of Afib on Coumadin  Hx of D-ICD  CABG with LIMA to OM patent, SVG to Dx (occluded), SVG to RCA patent, 2014  CKD  Hx of VT ablation, 12/2014 on Amiodarone/Mexiletene      Plan:  1. Concern for ACS given the chest pain, sob, palpitations, VT, and hx of CABG, proceed with emergency cardiac catheterization  2. DAPT  3. IVF  4. Heparin gtt  5. ICU afterwards  6. TTE  7. Nephrology consultation   8. GDMT for CHF  9. FULL CODE    Further recommendations based on results and clinical course    Thank you for allowing us to participate in the care of this patient. Please do not hesitate to call us with questions.     Electronically signed by Elsie Yancey MD on 10/19/2020 at 1:24 AM    Interventional Cardiology - [facility]

## 2020-10-19 NOTE — CARE COORDINATION
10/19/20, 7:49 AM EDT  DISCHARGE PLANNING EVALUATION:    Zaynab Diamond       Admitted from: ED 10/19/2020/ [facility] day: 0   Location: -03/003-A Reason for admit: STEMI (ST elevation myocardial infarction) ([address] [I21.3] Status: IP  Admit order signed?: no  PMH:  has a past medical history of Acute systolic CHF (congestive heart failure) ([address] Alcohol abuse, Anomalous origin of right coronary artery, Anxiety disorder, Arthritis, Atrial fibrillation ([address] CAD (coronary artery disease), Cardiomyopathy ([address] Chronic kidney disease, Depression, Diabetes mellitus ([address] Drug abuse ([address] GERD (gastroesophageal reflux disease), H/O cardiac catheterization, Hyperlipidemia, Hypertension, Intradural extramedullary spinal tumor, Lumbar spine tumor, Medtronic dual icd , Neuromuscular disorder ([address] Other disorders of kidney and ureter in diseases classified elsewhere, Paroxysmal atrial fibrillation ([address] V tach ([address] and V-tach ([facility] [address] Procedure:   10/19 Cardiac Cath: PCI to LAD & 1st Diagonal  Pertinent abnormal Imaging:  10/19 CXR: No acute findings  Medications:  Scheduled Meds:   sodium chloride flush  10 mL Intravenous 2 times per day    [START ON 10/20/2020] aspirin  81 mg Oral Daily    ticagrelor  90 mg Oral BID    atorvastatin  80 mg Oral Nightly    amiodarone  200 mg Oral Daily    amLODIPine  5 mg Oral BID    hydrALAZINE  100 mg Oral TID    isosorbide mononitrate  120 mg Oral Daily    metoprolol tartrate  25 mg Oral BID    mexiletine  300 mg Oral 3 times per day     Continuous Infusions:   nitroGLYCERIN 140 mcg/min (10/19/20 9494)      Pertinent Info/Orders/Treatment Plan: Presented with c/o acute onset of chest pain at rest with diaphoresis and SOB. Felt his heart race. Found to have VT requiring cardioversion. Patient had D-ICD, that had not fired. STEMI and taken to cath lab and was stented - see note above. Echo done, but not read yet. Plan to remove sheath today. Afebrile. NSR. On room air. Ox4. Puncture site WNL; sheath in place. Cardiac Rehab and Dietitian consulted. Seizure precautions. Telemetry, I&O, daily weight, n/v checks, wound care. Nitro drip stopped d/t severe headache and started on Cardene drip. Cardene @ 7.5 mg/hr, asa, lipitor, prn xanax, amio, hydralazine, prn norco, imdur, cozaar, lopressor, mexitil, multivitamin, phenobarbital, brilinta, thiamine. Received 1L fld bolus and 324 mg asa x1. Na+ 133, CO2 19, BUN 26, Creat 1.8, pro-bnp 3353, trop 0.151, cholesterol 221, lipase 82.6, hgb 13.6, INR 1.78. ETOH was neg. Diet: DIET CARDIAC;   Smoking status:  reports that he has been smoking cigarettes. He started smoking about 40 years ago. He has a 32.00 pack-year smoking history. He has quit using smokeless tobacco.   PCP: ISABELLA Alvarado CNP  Readmission 30 days or less: no  Readmission Risk Score: 25%    Discharge Planning Evaluation  Current Residence:     Living Arrangements:      Support Systems:     Current Services PTA:     Potential Assistance Needed:     Potential Assistance Purchasing Medications:     Does patient want to participate in local refill/ meds to beds program?     Type of Home Care Services:     Patient expects to be discharged to:     Expected Discharge date: Follow Up Appointment: Best Day/ Time:      Patient Goals/Plan/Treatment Preferences: Spoke with Ángela Howell; states he lives at home alone and did not use any DME or have any  services PTA. He states he drives, cares for himself independently, and has a PCP. He states he is able to afford his meds, adding that he has really good insurance. Ángela Howell states he plans to return home at discharge, denies needs, and declines [facility] stating he doesn't need it. Transportation/Food Security/Housekeeping Addressed:  No issues identified.  Evaluation: SW consulted by Cardiology for consideration of rehab.

## 2020-10-19 NOTE — ED NOTES
Pt alert and oriented at this time. Pt states his pain is gone. RR easy and unlabored.       Lata Teran RN  10/19/20 0106

## 2020-10-20 ENCOUNTER — TELEPHONE (OUTPATIENT)
Dept: FAMILY MEDICINE CLINIC | Age: 55
End: 2020-10-20

## 2020-10-20 VITALS
OXYGEN SATURATION: 99 % | TEMPERATURE: 98.3 F | BODY MASS INDEX: 37.06 KG/M2 | WEIGHT: 288.8 LBS | RESPIRATION RATE: 17 BRPM | DIASTOLIC BLOOD PRESSURE: 66 MMHG | SYSTOLIC BLOOD PRESSURE: 136 MMHG | HEART RATE: 63 BPM | HEIGHT: 74 IN

## 2020-10-20 LAB
GLUCOSE BLD-MCNC: 143 MG/DL (ref 70–108)
GLUCOSE BLD-MCNC: 174 MG/DL (ref 70–108)

## 2020-10-20 PROCEDURE — 2500000003 HC RX 250 WO HCPCS: Performed by: NURSE PRACTITIONER

## 2020-10-20 PROCEDURE — 6370000000 HC RX 637 (ALT 250 FOR IP): Performed by: NURSE PRACTITIONER

## 2020-10-20 PROCEDURE — 6360000002 HC RX W HCPCS: Performed by: NURSE PRACTITIONER

## 2020-10-20 PROCEDURE — 94761 N-INVAS EAR/PLS OXIMETRY MLT: CPT

## 2020-10-20 PROCEDURE — 6370000000 HC RX 637 (ALT 250 FOR IP): Performed by: INTERNAL MEDICINE

## 2020-10-20 PROCEDURE — 2580000003 HC RX 258: Performed by: NURSE PRACTITIONER

## 2020-10-20 PROCEDURE — 99232 SBSQ HOSP IP/OBS MODERATE 35: CPT | Performed by: NURSE PRACTITIONER

## 2020-10-20 PROCEDURE — 82948 REAGENT STRIP/BLOOD GLUCOSE: CPT

## 2020-10-20 RX ORDER — PHENOBARBITAL 32.4 MG/1
32.4 TABLET ORAL 2 TIMES DAILY
Status: DISCONTINUED | OUTPATIENT
Start: 2020-10-21 | End: 2020-10-20 | Stop reason: HOSPADM

## 2020-10-20 RX ORDER — PHENOBARBITAL 32.4 MG/1
64.8 TABLET ORAL 2 TIMES DAILY
Status: DISCONTINUED | OUTPATIENT
Start: 2020-10-20 | End: 2020-10-20 | Stop reason: HOSPADM

## 2020-10-20 RX ORDER — ATORVASTATIN CALCIUM 80 MG/1
80 TABLET, FILM COATED ORAL NIGHTLY
Qty: 30 TABLET | Refills: 3 | Status: SHIPPED | OUTPATIENT
Start: 2020-10-20 | End: 2020-12-29 | Stop reason: SDUPTHER

## 2020-10-20 RX ORDER — ASPIRIN 81 MG/1
81 TABLET, CHEWABLE ORAL DAILY
Qty: 30 TABLET | Refills: 3 | Status: SHIPPED | OUTPATIENT
Start: 2020-10-21 | End: 2021-06-03

## 2020-10-20 RX ADMIN — DEXTROSE MONOHYDRATE 10 MG/HR: 50 INJECTION, SOLUTION INTRAVENOUS at 06:28

## 2020-10-20 RX ADMIN — LOSARTAN POTASSIUM 50 MG: 50 TABLET, FILM COATED ORAL at 08:56

## 2020-10-20 RX ADMIN — PHENOBARBITAL SODIUM 328.9 MG: 65 INJECTION INTRAMUSCULAR; INTRAVENOUS at 00:53

## 2020-10-20 RX ADMIN — HYDRALAZINE HYDROCHLORIDE 100 MG: 50 TABLET ORAL at 08:57

## 2020-10-20 RX ADMIN — MEXILETINE HYDROCHLORIDE 300 MG: 150 CAPSULE ORAL at 06:59

## 2020-10-20 RX ADMIN — ACETAMINOPHEN 650 MG: 325 TABLET ORAL at 06:58

## 2020-10-20 RX ADMIN — AMIODARONE HYDROCHLORIDE 200 MG: 200 TABLET ORAL at 08:57

## 2020-10-20 RX ADMIN — INSULIN LISPRO 2 UNITS: 100 INJECTION, SOLUTION INTRAVENOUS; SUBCUTANEOUS at 08:58

## 2020-10-20 RX ADMIN — MEXILETINE HYDROCHLORIDE 300 MG: 150 CAPSULE ORAL at 00:42

## 2020-10-20 RX ADMIN — ISOSORBIDE MONONITRATE 120 MG: 60 TABLET, EXTENDED RELEASE ORAL at 08:57

## 2020-10-20 RX ADMIN — ENOXAPARIN SODIUM 40 MG: 40 INJECTION SUBCUTANEOUS at 11:21

## 2020-10-20 RX ADMIN — ASPIRIN 81 MG: 81 TABLET, CHEWABLE ORAL at 08:57

## 2020-10-20 RX ADMIN — HYDRALAZINE HYDROCHLORIDE 100 MG: 50 TABLET ORAL at 00:43

## 2020-10-20 RX ADMIN — ACETAMINOPHEN 650 MG: 325 TABLET ORAL at 12:18

## 2020-10-20 RX ADMIN — TICAGRELOR 90 MG: 90 TABLET ORAL at 08:56

## 2020-10-20 RX ADMIN — THERA TABS 1 TABLET: TAB at 08:56

## 2020-10-20 RX ADMIN — Medication 100 MG: at 08:56

## 2020-10-20 RX ADMIN — METOPROLOL TARTRATE 25 MG: 50 TABLET, FILM COATED ORAL at 08:57

## 2020-10-20 RX ADMIN — DEXTROSE MONOHYDRATE 10 MG/HR: 50 INJECTION, SOLUTION INTRAVENOUS at 00:43

## 2020-10-20 RX ADMIN — PHENOBARBITAL 64.8 MG: 32.4 TABLET ORAL at 08:57

## 2020-10-20 RX ADMIN — DEXTROSE MONOHYDRATE 10 MG/HR: 50 INJECTION, SOLUTION INTRAVENOUS at 03:36

## 2020-10-20 RX ADMIN — INSULIN LISPRO 2 UNITS: 100 INJECTION, SOLUTION INTRAVENOUS; SUBCUTANEOUS at 12:55

## 2020-10-20 ASSESSMENT — PAIN SCALES - GENERAL
PAINLEVEL_OUTOF10: 7
PAINLEVEL_OUTOF10: 6

## 2020-10-20 NOTE — PROGRESS NOTES
Cardiology Progress Note      Patient:  Vikash Lindsay  YOB: 1965  MRN: [id number]   Acct: [de-identified]  Admit Date:  10/19/2020  Primary Cardiologist: Dr. Elke Juarez  Seen by Dr. Yenifer Petersen    Per prior cardiology consult note-     CHIEF COMPLAINT:  CP/VT        HPI: This is a pleasant 54 y.o. male presents with acute onset of chest pain at rest, associated with diaphoresis, and sob. Felt his heart race. Came to ED, found to have VT requiring DCCV. Post ECG shows diffuse ST-depressions with aVR elevation. Had D-ICD, has not fired. 0 events seen on most recent interrogation. He is on Amiodarone and Mexiletine. He has hx of Afib on Coumadin. Compliant with medications. Takes Plavix as well. No bleeding. BP is stable. EF 40%, moderate LV hypokinesis. Cr 1.9, Trop 0.151. He notes he was drinking more alcohol that usual over the last 2 days. No smoking      Subjective (Events in last 24 hours):      This am pt with severe headache - noted nitro IV at 140 mcg/min - for BP control   /     Tele Stable sr   Pt states anxious - chronic issue but elevated at present     Pt states he drinks crown \"every now and then\" - but this weekend he \"hit it hard\"  Still smoking 3/4-1 pack day     RT groin with Art sheath in place -- no ecchymosis or hematoma - PPP- neurovascular check WNL       10/20/2020  Pt doing well this am - no cardiac c/o   Tele SR no ectopy  RT groin site WNL - PPP    remains in low dose nicardipine     He wants to go home today     Objective:   /86   Pulse 69   Temp 97.8 °F (36.6 °C) (Oral)   Resp 14   Ht 6' 2\" (1.88 m)   Wt 288 lb 12.8 oz (131 kg)   SpO2 99%   BMI 37.08 kg/m²        TELEMETRY: SR no ectopy HR 60's    Physical Exam:  General Appearance: alert and oriented to person, place and time, in no acute distress  Cardiovascular: normal rate, regular rhythm, normal S1 and S2, no murmurs, rubs, clicks, or gallops, distal pulses intact,   Pulmonary/Chest: clear to auscultation bilaterally- no wheezes, rales or rhonchi, normal air movement, no respiratory distress  Abdomen: soft, non-tender, non-distended, normal bowel sounds, no masses Extremities: no cyanosis, clubbing or edema, pulses present   Skin: warm and dry, no rash or erythema   Musculoskeletal: normal range of motion, no joint swelling, deformity or tenderness  Neurological: alert, oriented, normal speech, no focal findings or movement disorder noted    Medications:    PHENobarbital  64.8 mg Oral BID    Followed by   Esperanza Mendez ON 10/21/2020] PHENobarbital  32.4 mg Oral BID    aspirin  81 mg Oral Daily    ticagrelor  90 mg Oral BID    atorvastatin  80 mg Oral Nightly    amiodarone  200 mg Oral Daily    [Held by provider] amLODIPine  5 mg Oral BID    hydrALAZINE  100 mg Oral TID    isosorbide mononitrate  120 mg Oral Daily    metoprolol tartrate  25 mg Oral BID    mexiletine  300 mg Oral 3 times per day    losartan  50 mg Oral Daily    sodium chloride flush  10 mL Intravenous 2 times per day    thiamine  100 mg Oral Daily    multivitamin  1 tablet Oral Daily    enoxaparin  40 mg Subcutaneous Daily    insulin lispro  0-12 Units Subcutaneous TID WC    insulin lispro  0-6 Units Subcutaneous Nightly    nicotine  1 patch Transdermal Daily      niCARdipine 10 mg/hr (10/20/20 0628)    dextrose       HYDROcodone 5 mg - acetaminophen, 1 tablet, Q6H PRN  ALPRAZolam, 0.5 mg, BID PRN  sodium chloride flush, 10 mL, PRN  acetaminophen, 650 mg, Q4H PRN  glucose, 15 g, PRN  dextrose, 12.5 g, PRN  glucagon (rDNA), 1 mg, PRN  dextrose, 100 mL/hr, PRN        Diagnostics:  EK-OCT-2020 03:46:01 [facility]-ICU ROUTINE RETRIEVAL  Sinus rhythm with occasional Premature ventricular complexes  ST & T wave abnormality, consider lateral ischemia  Prolonged QT interval or tu fusion, consider myocardial disease, electrolyte imbalance, or drug effects  Abnormal ECG  When compared with ECG of 19-OCT-2020 00:34,  Premature ventricular complexes are now Present  Criteria for Septal infarct are no longer Present  ST no longer depressed in Inferior leads  ST less depressed in Anterolateral leads  T wave inversion less evident in Lateral leads . .. Echo: pending   Electronically signed by Jacek Ruth MD (Interpreting   physician) on 09/25/2019 at 04:38 PM   ----------------------------------------------------------------      Findings      Mitral Valve   Structurally normal mitral valve. Aortic Valve   Structurally normal aortic valve. Aortic valve appears tricuspid. Tricuspid Valve   Tricuspid valve is structurally normal.      Pulmonic Valve   The pulmonic valve was not well visualized . Left Atrium   Mildly dilated left atrium. Left Ventricle   Ejection fraction is visually estimated at 40%. There was moderate global hypokinesis of the left ventricle. Left Ventricular size is Mildly increased . Pericardial Effusion   No evidence of any pericardial effusion. Stress: Summary   This Nuclear Medicine study was abnormal .   inferior infarct with arlette infarct ischemia   abnormal LV function      Recommendation   Clinical correlation is recommended.       Signatures      ----------------------------------------------------------------   Electronically signed by Jacek Ruth MD (Interpreting   Cardiologist) on 09/25/2019      Left Heart Cath:   Post-procedure Diagnosis/Findings:    SVG to Dx  - occluded  SVG to RCA -  Patent  LIMA to OM - patent  Native LAD 50% stenosis - FFR 0.76 - S/p 2 GENNARO  Large RI with 90% stenosis s/p 2 GENNARO            Native RCA with diffuse disease but patent with competitive flow                                                                                    TETO Lai MD  Electronically signed 10/19/2020 at 2:45 AM  Interventional Cardiology         Lab Data:    Cardiac Enzymes:  No results for input(s): CKTOTAL, CKMB, Qi Bernard in the last 72 hours.     CBC:   Lab Results   Component Value Date    WBC 10.2 10/19/2020    RBC 4.05 10/19/2020    RBC 4.75 11/03/2011    HGB 13.6 10/19/2020    HCT 40.7 10/19/2020     10/19/2020       CMP:    Lab Results   Component Value Date     10/19/2020    K 4.6 10/19/2020    K 4.1 02/03/2019     10/19/2020    CO2 19 10/19/2020    BUN 26 10/19/2020    CREATININE 1.8 10/19/2020    LABGLOM 39 10/19/2020    GLUCOSE 253 10/19/2020    CALCIUM 8.0 10/19/2020       Hepatic Function Panel:    Lab Results   Component Value Date    ALKPHOS 86 10/19/2020    ALT 21 10/19/2020    AST 27 10/19/2020    PROT 6.3 10/19/2020    BILITOT 0.4 10/19/2020    BILIDIR <0.2 10/19/2020    LABALBU 3.5 10/19/2020       Magnesium:    Lab Results   Component Value Date    MG 2.3 10/19/2020       PT/INR:    Lab Results   Component Value Date    INR 1.78 10/19/2020       HgBA1c:    Lab Results   Component Value Date    LABA1C 6.6 06/02/2020       FLP:    Lab Results   Component Value Date    TRIG 85 10/19/2020    HDL 42 10/19/2020    LDLCALC 162 10/19/2020       TSH:    Lab Results   Component Value Date    TSH 2.680 10/19/2020         Assessment:    Palpitations  / chest pain --- resolved   Noted stable VT on arrival to ER - s\p DCCV--> remains SR no ectopy     STEMI - lateral     S\p cardiac cath 10/19/2020:   SVG to Dx  - occluded  SVG to RCA -  Patent  LIMA to OM - patent  Native LAD 50% stenosis - FFR 0.76 - S/p 2 GENNARO  Large RI with 90% stenosis s/p 2 GENNARO            Native RCA with diffuse disease but patent with competitive flow                        Longstanding ICDMP EF 40% last echo - echo pending     Uncontrolled HTN -- wean off nicardipine     HLP  DM II -- HGB A1C 7.0 3/2020    CKD stage 3     Hx CABG LIMA to OM patent, SVG to Dx (occluded), SVG to RCA patent, 2014    Hx VT ablation 12/2014 -- on amiodarone and mextiline     Hx ETOH abuse - cessation discussed     Hx AFB - on coumadin / amiodarone         Plan:  · Ok to transfer to floor - daniele will be able to go home this bennett   · Almost off cardene   · He feels good - has been up   · Will see this afternoon and re- evaluate for home DC         Electronically signed by ISABELLA Nicole CNP on 10/20/2020 at 9:57 AM

## 2020-10-20 NOTE — CARE COORDINATION
10/20/20, 12:54 PM EDT    DISCHARGE ON GOING EVALUATION    Gissel Diaz day: 1  Location: -03/003-A Reason for admit: STEMI (ST elevation myocardial infarction) [facility]) [I21.3]   Procedure:   10/19 Cardiac Cath: PCI to LAD & 1st Diagonal  10/19 CXR: No acute findings  10/19 Echo with EF 30-35%  Treatment Plan of Care: Cardene weaned off this morning. Afebrile. NSR. On room air. Ox4. Puncture site WNL; sheath in place. Cardiac Rehab and Dietitian consulted. Seizure precautions. Telemetry, I&O, daily weight, n/v checks, wound care. Asa, lipitor, norvasc,prn xanax, amio, hydralazine, prn norco, SSI ACHS, imdur, cozaar, lopressor, mexitil, multivitamin, nicotine patch, phenobarbital, brilinta, thiamine. Barriers to Discharge: possible discharge today if BP stable after cardene drip weaned off  PCP: ISABELLA Suazo CNP  Readmission Risk Score: 29%  Patient Goals/Plan/Treatment Preferences: Home alone. Denies needs declines HH.          10/20/20, 3:43 PM EDT    Home alone. Denies needs, declines HH. Patient goals/plan/ treatment preferences discussed by  and . Patient goals/plan/ treatment preferences reviewed with patient/ family. Patient/ family verbalize understanding of discharge plan and are in agreement with goal/plan/treatment preferences. Understanding was demonstrated using the teach back method. AVS provided by RN at time of discharge, which includes all necessary medical information pertaining to the patients current course of illness, treatment, post-discharge goals of care, and treatment preferences.

## 2020-10-20 NOTE — PROGRESS NOTES
Inpatient Cardiac Rehabilitation Consult    Received consult for Phase II Cardiac Rehabilitation. Cardiac Rehab education completed with patient. Will call patient at home next week to schedule  Brochure given.

## 2020-10-20 NOTE — TELEPHONE ENCOUNTER
.Transition of Care visit scheduled.   10/27/2020  Patient is being discharged to Home  Date of discharge 10/20/20  Discharge from facility [facility]  Reason for admission Stemi, Cath, Stent

## 2020-10-20 NOTE — PROGRESS NOTES
Nutrition Education  Pt reports s/p CABG ~5 years ago. Pt reports intends to stop smoking. Pt reports appetite is fine & he is sleepy. · Verbally reviewed information with patient   · Educated on Heart healthy nutrtition education & encouraged CHO control diet for glucose control. Lipid levels provided. 6/2020 A1c provided. · Written educational materials provided. · Contact name and number provided. · Refer to Patient Education activity for more details.     Electronically signed by Otilio Hernandes RD, GIANFRANCO on 10/20/20 at 9:40 AM EDT    Contact: [phone]

## 2020-10-20 NOTE — PROGRESS NOTES
Patient discharged in stable condition to home. No questions or concerns at this time. Patient discharged with stemi folder and stent cards. Heart Attack and Acute Coronary Syndrome folder given to patient which includes the following handouts:     1. Heart Attack -What's Ahead  2. How to take your nitroglycerine  3. Resources for you and your family  4. Cardiac Rehab  5. Heart Healthy Diet  6. Acute MI -Reduce you risk of a heart attack  7. Mended Hearts handouts  8.  Patient experience survey

## 2020-10-21 ENCOUNTER — CARE COORDINATION (OUTPATIENT)
Dept: CASE MANAGEMENT | Age: 55
End: 2020-10-21

## 2020-10-21 NOTE — CARE COORDINATION
[address] Transitions Initial Follow Up Call    Call within 2 business days of discharge: Yes    Patient: Vikash Lindsay Patient : 1965   MRN: [id number]  Reason for Admission: STEMI   Discharge Date: 10/20/20 RARS: Readmission Risk Score: 30  10/19 Cardiac Cath: PCI to LAD & 1st Diagonal    Last Discharge [facility]       Complaint Diagnosis Description Type Department Provider    10/19/20 Chest Pain; Excessive Sweating Ventricular tachycardia ([address] . .. ED to Hosp-Admission (Discharged) (ADMITTED) URSULA 4D Arnulfo Currie MD; Leni Phoenix. .. Challenges to be reviewed by the provider   Additional needs identified to be addressed with provider No  none    Discussed COVID-19 related testing which was not done at this time. Test results were not done. Method of communication with provider : none    Advance Care Planning:   Does patient have an Advance Directive:  reviewed and current. Was this a readmission? No  Patient stated reason for admission: chest pain, heart attack  Patients top risk factors for readmission: ineffective coping and medical condition    Care Transition Nurse (CTN) contacted the patient by telephone to perform post hospital discharge assessment. Verified name and  with patient as identifiers. Provided introduction to self, and explanation of the CTN role. CTN reviewed discharge instructions, medical action plan and red flags with patient who verbalized understanding. Patient given an opportunity to ask questions and does not have any further questions or concerns at this time. Were discharge instructions available to patient? Yes. Reviewed appropriate site of care based on symptoms and resources available to patient including: PCP and Specialist. The patient agrees to contact the PCP office for questions related to their healthcare.      Medication reconciliation was NOT performed with  patient, Pt stated they went through it all at the hospital Advised obtaining a 90-day supply of all daily and as-needed medications. Discussed follow-up appointments. Is follow up appointment scheduled within 7 days of discharge? Yes    Plan for follow-up call in 5-7 days based on severity of symptoms and risk factors. Plan for next call: symptom management-chest pain?  palpitations? and medication management-coumadin dose, INR drawn?  pt goes to [facility]  CTN provided contact information for future needs. Non-face-to-face services provided:  Obtained and reviewed discharge summary and/or continuity of care documents    Care Transitions 24 Hour Call    Do you have any ongoing symptoms?:  No  Do you have a copy of your discharge instructions?:  Yes  Do you have all of your prescriptions and are they filled?:  Yes  Have you scheduled your follow up appointment?:  Yes  How are you going to get to your appointment?:  Car - family or friend to transport  Were you discharged with any Home Care or Post Acute Services:  No  Post Acute Services: Outpatient/Community Services (Comment: pt has decided to go to OP PT)  Do you feel like you have everything you need to keep you well at home?:  Yes  Care Transitions Interventions  No Identified Needs     Called pt for the [facility] initial follow up call. Pt denied any chest pain, nausea, sweating, palpitations, dizziness or SOB. Pt denied any pain, swelling or drainage at the right groin, cath insertion site. Pt stated he feels much better since the stents have been placed    Pt declined to review meds. CTN did review the new & stopped meds. Pt stated he will go tot [facility] for INR draw. Pt denied any needs or concerns. CTN will continue to follow.     Follow Up  Future Appointments   Date Time Provider Tate Hunt   10/26/2020  1:30 PM Galdino Rose MD [facility] P - [place]   10/27/2020  9:30 AM Tamara Ganser, APRN - [place]   12/2/2020  3:30 PM Tamara Ganser, [facility] [place] 12/21/2020  3:40 PM Ilene Talley MD [facility]  [place]   3/9/2021  2:15 PM ISABELLA Gallo - CNP [facility] CH[facility]  [place]   3/9/2021  3:30 PM SCHEDULE, SRPS PACER NURSE [facility] PACER IVETTP - Hunter Agosto RN  Care Transition Nurse  [phone]

## 2020-10-23 NOTE — PROGRESS NOTES
Physician Progress Note      PATIENT:               Adina Del Real  CSN #:                  [id number]  :                       1965  ADMIT DATE:       10/19/2020 12:22 AM  DISCH DATE:        10/20/2020 4:10 PM  RESPONDING  PROVIDER #:        Jessica Matias CNP          QUERY TEXT:    Dr Brady Roman,    Pt admitted with VT and concern for ACS. Noted documentation of STEMI by  ED   Physician and had catherization with stent placement x 4. If possible, please   document in progress notes and discharge summary:    The medical record reflects the following:  Risk Factors: Cardiomyopathy, HTN, DM, CHF, AFIB  Clinical Indicators: VT requiring DCCV. Post ECG shows diffuse ST-depressions   with aVR elevation. Per ED 'STEMI'. Trop 0.151  Treatment: IV Nitro drip, Heparin, Heart Cath  Options provided:  -- STEMI confirmed POA  -- STEMI ruled out  -- Other - I will add my own diagnosis  -- Disagree - Not applicable / Not valid  -- Disagree - Clinically unable to determine / Unknown  -- Refer to Clinical Documentation Reviewer    PROVIDER RESPONSE TEXT:    The diagnosis of STEMI was confirmed as POA.     Query created by: Tasha Owens on 10/19/2020 7:59 AM      Electronically signed by:  Jessica Matias CNP 10/23/2020 8:13 AM

## 2020-10-23 NOTE — PROCEDURES
[address] [place] [address]                            CARDIAC CATHETERIZATION    PATIENT NAME: ALESSIO SARABIA                      :        1965  MED REC NO:   [id number]                           ROOM:       0003  ACCOUNT NO:   [de-identified]                           ADMIT DATE: 10/19/2020  PROVIDER:     Hillary Pradhan MD    DATE OF PROCEDURE:  10/19/2020    INDICATION:  Ventricular tachycardia, ST-elevation myocardial  infarction. DESCRIPTION OF PROCEDURE:  After a verbal informed consent was obtained  from the patient, he was brought to the cardiac catheterization  laboratory and prepped in sterile fashion. Right femoral artery was  chosen as the primary point of access. Preprocedure timeout was  completed. After infiltration of the right inguinal region with 2%  lidocaine using micropuncture and modified Seldinger technique under  fluoroscopic guidance and ultrasound guidance, I was able to insert a  6-Wallisian sheath into the right femoral artery. Thereafter, diagnostic  coronary angiography was performed using a JL-4.    CORONARY ANGIOGRAM:  LEFT MAIN:  Patent without any significant obstruction. LAD:  About 50% stenosis in the mid LAD. The distal LAD has mild  luminal irregularities, but no significant obstruction. There is about  90% diagonal stenosis seen in the proximal aspect of the diagonal.    LCX:  There is a previously placed stent in the proximal LCX that is  patent. RCA:  . Diffusely diseased and there is about 70% to 80% stenosis  proximally and about 90% stenosis in the mid segment. BYPASS ANGIOGRAPHY:  SVG TO RCA:  Ostium, body, anastomosis of the SVG to RCA are patent. RCA beyond the anastomosis has mild luminal irregularities, but no  significant obstruction. RCA is dominant. LIMA TO OM:  Ostium, body, and anastomosis of the LIMA to OM are patent.   There is no significant disease beyond the OM. The OM again is  perfusing in the antegrade direction. INTERVENTION:  Given the findings and presentation, I elected to proceed  with intervention. It was clear that the patient is having VT, but it  is unclear which vessels were. The LAD appeared to have 50% stenosis  and the diagonal branch also had 90% stenosis. At this point, likely  the diagonal branch is causing some degree of VT, but I want to make  sure that LAD was not ischemic. Therefore, I chose to proceed with FFR  of the LAD prior to proceeding with intervention given that I will need  to potentially bring the stent back into the ostium of the diagonal  branch which would make stenting of the LAD difficult. I exchanged out  for a EBU 3.75 guiding catheter, 6-Argentine. I cannulated the left main  without any complication. Heparin IV was given. ACT was confirmed to  be above 250 seconds. I wired the LAD using a FFR wire. IC  nitroglycerin was given. IV adenosine was infused. Three minutes after  FFR adenosine infusion the FFR was 0.76. Given these findings, I  elected to proceed with intervention. I stented the LAD using a 25 cm  Xience Natali GENNARO. Postdilated the stent with a 4.0 x 15 NC balloon up  to 12 to 24 atmospheres. Post-PCI angiography demonstrated JESÚS-3 flow  without any perforation, dissection, distal embolization, side branch  loss, or guide or guidewire-induced trauma. I re-wired and took the FFR  wire out, re-wired the diagonal branch and then stented the diagonal  branch with a 2.5 x 23 Xience Natali GENNARO at 12 atmospheres. I  postdilated the stent with 3.0 x 15 NC balloon up to 20 atmospheres. I  then passed a 2.5 x 33 Xience Natali GENNARO in an overlapping fashion  distal to the first stent deployed at 9 atmospheres. I postdilated the  stent with a 3.0 NC balloon up to 24 atmospheres.   Post-PCI angiography  demonstrated JESÚS-3 flow without any perforation, dissection, distal  embolization, side branch loss, or guide or guidewire-induced trauma. All equipments were removed from the patient. The patient tolerated the  procedure well. IMMEDIATE COMPLICATIONS:  None. MEDICATIONS:  See EMR. ESTIMATED BLOOD LOSS:  Less than 50 mL. SUMMARY:  Successful FFR-guided PCI of the LAD with one drug-eluting  stent; successful PCI of the diagonal branch. PLAN:  1. Bedrest.  2.  IV fluids. 3.  Overnight observation. 4.  Guideline-directed therapy for CAD. 5.  TTE. 6.  Followup with myself in one to two weeks postprocedure. All the above were explained to the patient and the patient's family. They are agreeable and amenable to the plan.         Kahlil Vizcaino MD    D: 10/22/2020 15:44:02       T: 10/22/2020 16:32:20     ANA PAULA_TOREY  Job#: [id number]     Doc#: [id number]    CC:

## 2020-10-26 ENCOUNTER — OFFICE VISIT (OUTPATIENT)
Dept: CARDIOLOGY CLINIC | Age: 55
End: 2020-10-26
Payer: COMMERCIAL

## 2020-10-26 VITALS
BODY MASS INDEX: 35.65 KG/M2 | DIASTOLIC BLOOD PRESSURE: 80 MMHG | HEART RATE: 72 BPM | WEIGHT: 277.8 LBS | SYSTOLIC BLOOD PRESSURE: 138 MMHG | HEIGHT: 74 IN

## 2020-10-26 PROCEDURE — 99214 OFFICE O/P EST MOD 30 MIN: CPT | Performed by: NUCLEAR MEDICINE

## 2020-10-26 NOTE — PROGRESS NOTES
[address] [address]  Dept: [phone]  Dept Fax: [phone]  Loc: [phone]    Visit Date: 10/26/2020    Lennie Kennedy is a 54 y.o. male who presents todayfor:  Chief Complaint   Patient presents with    Follow-up    Atrial Fibrillation    Hypertension    Cardiomyopathy    Coronary Artery Disease     Admitted for v tach   They thought it was a STEMI  Seen Perera Forward   Cath done  Stent to an LAD  No ICD shocks since then   A fib seems stable  No chest pain   Some baseline dyspnea  Known cABG and CMP   Baseline dyspnea  Was still smoking and drinking till then   Compliance issues before  Better now  No syncope  BP is stable     HPI:  HPI  Past Medical History:   Diagnosis Date    Acute systolic CHF (congestive heart failure) ([address] 7/16/2015    Alcohol abuse     stopped drinking since 2012    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    Anxiety disorder     Arthritis     Atrial fibrillation ([address]     CAD (coronary artery disease) 3/25/2014    s/p CABG in may 2014    Cardiomyopathy ([address]     Chronic kidney disease     Depression     Diabetes mellitus ([address]     Drug abuse ([address]     hx of cacaine abuse, stopped abusing drugs in 2012    GERD (gastroesophageal reflux disease)     H/O cardiac catheterization 4/30/2014    Hyperlipidemia     Hypertension     Intradural extramedullary spinal tumor     Lumbar spine tumor     Medtronic dual icd      Neuromuscular disorder ([address]     Other disorders of kidney and ureter in diseases classified elsewhere     Paroxysmal atrial fibrillation ([address] 7/22/2014    V tach ([address]     V-tach [facility])     s/p ablation in dec 2014      Past Surgical History:   Procedure Laterality Date    CARDIAC CATHETERIZATION  3/20/14     [facility]    CARDIAC DEFIBRILLATOR PLACEMENT  10/13/2015    [employer] [facility], MRI CONDITIONAL ICD    CORONARY ARTERY BYPASS GRAFT  5-9-14    3 bypass    EKG 12-LEAD  2015         OTHER SURGICAL HISTORY Left 10/21/14    Left Bursectomy, I & D Left Elbow - Dr. Clay Bassett LAMINECTOMY,>2 [facility] N/A 2018    LUMBAR AND SACRAL LAMINECTOMY, REMOVAL OF INTRASPINAL TUMORS performed by Alayna Decker MD at [address] harvest from legs     Family History   Problem Relation Age of Onset    Diabetes Mother     High Blood Pressure Mother     Stroke Mother     Diabetes Father     Heart Disease Father     High Blood Pressure Father     Heart Disease Sister         CABG    Diabetes Maternal Grandmother     Diabetes Maternal Grandfather     Heart Disease Paternal Grandfather      Social History     Tobacco Use    Smoking status: Former Smoker     Packs/day: 1.00     Years: 32.00     Pack years: 32.00     Types: Cigarettes     Start date: 1980     Last attempt to quit: 10/19/2020     Years since quittin.0    Smokeless tobacco: Former User   Substance Use Topics    Alcohol use:  Yes     Alcohol/week: 0.0 standard drinks     Comment: occasional      Current Outpatient Medications   Medication Sig Dispense Refill    aspirin 81 MG chewable tablet Take 1 tablet by mouth daily 30 tablet 3    atorvastatin (LIPITOR) 80 MG tablet Take 1 tablet by mouth nightly 30 tablet 3    ticagrelor (BRILINTA) 90 MG TABS tablet Take 1 tablet by mouth 2 times daily 60 tablet 3    linagliptin (TRADJENTA) 5 MG tablet TAKE 1 TABLET BY MOUTH DAILY 90 tablet 3    potassium chloride (KLOR-CON M) 20 MEQ extended release tablet TAKE 1 TABLET BY MOUTH DAILY 30 tablet 11    amiodarone (CORDARONE) 200 MG tablet Take 1 tablet by mouth daily 90 tablet 3    amLODIPine (NORVASC) 5 MG tablet Take 1 tablet by mouth 2 times daily 180 tablet 3    metoprolol tartrate (LOPRESSOR) 25 MG tablet Take 1 tablet by mouth 2 times daily 180 tablet 3    ALPRAZolam (XANAX) 0.5 MG tablet TAKE 1 TABLET BY MOUTH AT BEDTIME FOR ANXIETY 30 tablet 5    losartan (COZAAR) 50 MG tablet Take 1 tablet by mouth daily 90 tablet 1    mexiletine (MEXITIL) 150 MG capsule TAKE 2 CAPSULES THREE TIMES DAILY FOR ATRIAL FIBRILLATION 540 capsule 1    metFORMIN (GLUCOPHAGE) 500 MG tablet Take 1 tablet by mouth 2 times daily (with meals) 180 tablet 3    bumetanide (BUMEX) 2 MG tablet Take 1 tablet by mouth daily 90 tablet 3    isosorbide mononitrate (IMDUR) 120 MG extended release tablet Take 1 tablet by mouth daily 90 tablet 3    hydrALAZINE (APRESOLINE) 100 MG tablet Take 1 tablet by mouth 3 times daily 270 tablet 3    warfarin (COUMADIN) 5 MG tablet USE AS DIRECTED BY COUMADIN CLINIC (Patient taking differently: Dosed per [facility] CC) 180 tablet 5    nitroGLYCERIN (NITROSTAT) 0.4 MG SL tablet Place 1 tablet under the tongue every 5 minutes as needed for Chest pain X 3 doses. If chest pain continues seek medical attention 25 tablet 3    glucose blood VI test strips (PHIL CONTOUR TEST) strip 1 each by In Vitro route daily 300 each 3    acetaminophen 650 MG TABS Take 650 mg by mouth every 4 hours as needed 120 tablet 3     No current facility-administered medications for this visit.       Allergies   Allergen Reactions    Pcn [Penicillins] Hives    Zoloft [Sertraline Hcl] Anxiety     Worsened anxiety     Health Maintenance   Topic Date Due    Pneumococcal 0-64 years Vaccine (1 of 3 - PCV13) 05/20/1971    Hepatitis B vaccine (1 of 3 - Risk 3-dose series) 05/20/1984    Colon cancer screen colonoscopy  05/20/2015    Diabetic retinal exam  12/15/2016    Low dose CT lung screening  05/20/2020    Flu vaccine (1) 09/01/2020    Diabetic foot exam  12/04/2020    A1C test (Diabetic or Prediabetic)  06/02/2021    Lipid screen  10/19/2021    TSH testing  10/19/2021    Potassium monitoring  10/19/2021    Creatinine monitoring  10/19/2021    DTaP/Tdap/Td vaccine (2 - Td) 06/25/2027    Hepatitis C screen  Addressed    HIV screen  Addressed    Hepatitis A vaccine  Aged Out    Hib vaccine  Aged Out    Meningococcal (ACWY) vaccine  Aged Out       Subjective:  Review of Systems  General:   No fever, no chills, No fatigue or weight loss  Pulmonary:    some dyspnea, no wheezing  Cardiac:    Denies recent chest pain,   GI:     No nausea or vomiting, no abdominal pain  Neuro:    No dizziness or light headedness,   Musculoskeletal:  No recent active issues  Extremities:   No edema, no obvious claudication         Objective:  Physical Exam  /80   Pulse 72   Ht 6' 2\" (1.88 m)   Wt 277 lb 12.8 oz (126 kg)   BMI 35.67 kg/m²   General:   Well developed, well nourished  Lungs:   Clear to auscultation  Heart:    Normal S1 S2, Slight murmur. no rubs, no gallops  Abdomen:   Soft, non tender, no organomegalies, positive bowel sounds  Extremities:   No edema, no cyanosis, good peripheral pulses  Neurological:   Awake, alert, oriented. No obvious focal deficits  Musculoskelatal:  No obvious deformities    Assessment:      Diagnosis Orders   1. Ischemic cardiomyopathy     2. Dilated cardiomyopathy ([address]     3. Essential hypertension     4. Familial hypercholesterolemia     5. Paroxysmal atrial fibrillation (HCC)     6. ICD (implantable cardioverter-defibrillator) in place     as above  Complicated patient   multiple medical issues  Seems stable for most part       Plan:  No follow-ups on file. As above  Discussed options   Medical Rx for now  Check the ICD  Stop ASA in 1 month   Continue risk factor modification and medical management  Thank you for allowing me to participate in the care of your patient. Please don't hesitate to contact me regarding any further issues related to the patient care    Orders Placed:  No orders of the defined types were placed in this encounter. Medications Prescribed:  No orders of the defined types were placed in this encounter. Discussed use, benefit, and side effects of prescribed medications.  All patient questions answered. Pt voicedunderstanding. Instructed to continue current medications, diet and exercise. Continue risk factor modification and medical management. Patient agreed with treatment plan. Follow up as directed.     Electronically signedby Parul Flowers MD on 10/26/2020 at 1:36 PM

## 2020-10-27 ENCOUNTER — OFFICE VISIT (OUTPATIENT)
Dept: FAMILY MEDICINE CLINIC | Age: 55
End: 2020-10-27
Payer: COMMERCIAL

## 2020-10-27 ENCOUNTER — TELEPHONE (OUTPATIENT)
Dept: FAMILY MEDICINE CLINIC | Age: 55
End: 2020-10-27

## 2020-10-27 VITALS
BODY MASS INDEX: 27.82 KG/M2 | DIASTOLIC BLOOD PRESSURE: 80 MMHG | RESPIRATION RATE: 16 BRPM | HEART RATE: 67 BPM | TEMPERATURE: 97.2 F | SYSTOLIC BLOOD PRESSURE: 126 MMHG | WEIGHT: 216.7 LBS | OXYGEN SATURATION: 98 %

## 2020-10-27 PROBLEM — Z95.820 STATUS POST ANGIOPLASTY WITH STENT: Status: ACTIVE | Noted: 2020-10-27

## 2020-10-27 PROBLEM — I21.02 ST ELEVATION MYOCARDIAL INFARCTION INVOLVING LEFT ANTERIOR DESCENDING (LAD) CORONARY ARTERY (HCC): Status: ACTIVE | Noted: 2020-10-19

## 2020-10-27 PROCEDURE — 99495 TRANSJ CARE MGMT MOD F2F 14D: CPT | Performed by: NURSE PRACTITIONER

## 2020-10-27 ASSESSMENT — ENCOUNTER SYMPTOMS
BACK PAIN: 1
NAUSEA: 0
ABDOMINAL PAIN: 0
COUGH: 0
SHORTNESS OF BREATH: 0

## 2020-10-27 NOTE — PROGRESS NOTES
Visit Information    Have you changed or started any medications since your last visit including any over-the-counter medicines, vitamins, or herbal medicines? yes -    Are you having any side effects from any of your medications? -  no  Have you stopped taking any of your medications? Is so, why? -  no    Have you seen any other physician or provider since your last visit? Yes - Records Obtained  Have you had any other diagnostic tests since your last visit? Yes - Records Obtained  Have you been seen in the emergency room and/or had an admission to a hospital since we last saw you? Yes - Records Obtained  Have you had your routine dental cleaning in the past 6 months? na    Have you activated your [employer] account? If not, what are your barriers?  No:      Patient Care Team:  ISABELLA Montgomery CNP as PCP - General (Certified Nurse Practitioner)  ISABELLA Montgomery CNP as PCP - [facility] Provider  Kam Reyes MD as Consulting Physician (Orthopedic Surgery)  Rick Jaquez MD as Consulting Physician (Cardiology)  Momo Guo RN as Ambulatory Care Manager  Angel Peguero RN as Care Transitions Nurse    Medical History Review  Past Medical, Family, and Social History reviewed and does contribute to the patient presenting condition    Health Maintenance   Topic Date Due    Pneumococcal 0-64 years Vaccine (1 of 3 - PCV13) 05/20/1971    Hepatitis B vaccine (1 of 3 - Risk 3-dose series) 05/20/1984    Colon cancer screen colonoscopy  05/20/2015    Diabetic retinal exam  12/15/2016    Low dose CT lung screening  05/20/2020    Flu vaccine (1) 09/01/2020    Diabetic foot exam  12/04/2020    A1C test (Diabetic or Prediabetic)  06/02/2021    Lipid screen  10/19/2021    TSH testing  10/19/2021    Potassium monitoring  10/19/2021    Creatinine monitoring  10/19/2021    DTaP/Tdap/Td vaccine (2 - Td) 06/25/2027    Hepatitis C screen  Addressed    HIV screen  Addressed    Hepatitis A vaccine Aged Out    Hib vaccine  Aged Out    Meningococcal (ACWY) vaccine  Aged Out

## 2020-10-27 NOTE — PROGRESS NOTES
Post-Discharge Transitional Care Management Services      Lennie Kennedy   YOB: 1965    Date of Visit:  10/27/2020  30 DayPost-Discharge Date: 11/20/20    Chief Complaint   Patient presents with    Follow-Up from [address] [place] [address] [address]. [facility] 10/20/2020    Flu Vaccine     declines        Admit Date:  10/19/2020  Discharge Date: 10/20/20    Primary Cardiologist: Dr. Trinidad Christianson  Seen by Dr. Perera Forward     Per prior cardiology consult note-     CHIEF COMPLAINT:  CP/VT        HPI: This is a pleasant 54 y. o. male presents with acute onset of chest pain at rest, associated with diaphoresis, and sob.  Felt his heart race.  Came to ED, found to have VT requiring DCCV.  Post ECG shows diffuse ST-depressions with aVR elevation.  Had D-ICD, has not fired.  0 events seen on most recent interrogation.  He is on Amiodarone and Mexiletine.  He has hx of Afib on Coumadin.  Compliant with medications.  Takes Plavix as well.  No bleeding.  BP is stable.  EF 40%, moderate LV hypokinesis.  Cr 1.9, Trop 0.151.  He notes he was drinking more alcohol that usual over the last 2 days.  No smoking.      Subjective (Events in last 24 hours):      This am pt with severe headache - noted nitro IV at 140 mcg/min - for BP control      Tele Stable sr   Pt states anxious - chronic issue but elevated at present      Pt states he drinks crown \"every now and then\" - but this weekend he \"hit it hard\"  Still smoking 3/4-1 pack day      RT groin with Art sheath in place -- no ecchymosis or hematoma - PPP- neurovascular check WNL         10/20/2020  Pt doing well this am - no cardiac c/o   Tele SR no ectopy  RT groin site WNL - PPP     remains in low dose nicardipine      Echo:10/19/20    Conclusions      Summary   Technically difficult examination. Left ventricle size is normal.   Normal left ventricular wall thickness. There was severe global hypokinesis of the left ventricle. Systolic function was severely reduced.    Ejection fraction is visually estimated in the range of 30 % to 35%.    The left atrium is Moderately dilated.        Left Heart Cath:   Post-procedure Diagnosis/Findings:    SVG to Dx  - occluded  SVG to RCA -  Patent  LIMA to OM - patent  Native LAD 50% stenosis - FFR 0.76 - S/p 2 GENNARO  Large RI with 90% stenosis s/p 2 GENNARO            Native RCA with diffuse disease but patent with competitive flow             Dr. Estela Batres - Micah Daley 10/26/20  Cesar Roger - RENAL  COUMADIN - Yazan Cunha NP - UROLOGY    Lab Results   Component Value Date    LABA1C 6.6 06/02/2020     No results found for: EAG      Follow Up         Future Appointments   Date Time Provider Tate Hunt   10/26/2020  1:30 PM Josesito Nice MD [address] [facility] [place]   10/27/2020  9:30 AM ISABELLA Wesley   12/2/2020  3:30 PM ISABELLA Wesley   12/21/2020  3:40 PM Marimar Damon MD [facility] [place]   3/9/2021  2:15 PM ISABELLA Gallo CNP [facility]F [facility] - [place]   3/9/2021  3:30 PM SCHEDULE, SRPS PACER NURSE [facility] PACER [facility] [place]         Allergies   Allergen Reactions    Latex     Pcn [Penicillins] Hives    Zoloft [Sertraline Hcl] Anxiety     Worsened anxiety     Outpatient Medications Marked as Taking for the 10/27/20 encounter (Office Visit) with ISABELLA Wesley CNP   Medication Sig Dispense Refill    aspirin 81 MG chewable tablet Take 1 tablet by mouth daily 30 tablet 3    atorvastatin (LIPITOR) 80 MG tablet Take 1 tablet by mouth nightly 30 tablet 3    ticagrelor (BRILINTA) 90 MG TABS tablet Take 1 tablet by mouth 2 times daily 60 tablet 3    linagliptin (TRADJENTA) 5 MG tablet TAKE 1 TABLET BY MOUTH DAILY 90 tablet 3    potassium chloride (KLOR-CON M) 20 MEQ extended release tablet TAKE 1 TABLET BY MOUTH DAILY 30 tablet 11    amiodarone (CORDARONE) 200 MG tablet Take 1 tablet by mouth daily 90 tablet 3    amLODIPine (NORVASC) 5 MG tablet Take 1 tablet by mouth 2 times daily 180 tablet 3    metoprolol tartrate (LOPRESSOR) 25 MG tablet Take 1 tablet by mouth 2 times daily 180 tablet 3    ALPRAZolam (XANAX) 0.5 MG tablet TAKE 1 TABLET BY MOUTH AT BEDTIME FOR ANXIETY 30 tablet 5    losartan (COZAAR) 50 MG tablet Take 1 tablet by mouth daily 90 tablet 1    mexiletine (MEXITIL) 150 MG capsule TAKE 2 CAPSULES THREE TIMES DAILY FOR ATRIAL FIBRILLATION 540 capsule 1    metFORMIN (GLUCOPHAGE) 500 MG tablet Take 1 tablet by mouth 2 times daily (with meals) 180 tablet 3    bumetanide (BUMEX) 2 MG tablet Take 1 tablet by mouth daily 90 tablet 3    isosorbide mononitrate (IMDUR) 120 MG extended release tablet Take 1 tablet by mouth daily 90 tablet 3    hydrALAZINE (APRESOLINE) 100 MG tablet Take 1 tablet by mouth 3 times daily 270 tablet 3    warfarin (COUMADIN) 5 MG tablet USE AS DIRECTED BY COUMADIN CLINIC (Patient taking differently: Dosed per [facility] CC) 180 tablet 5    nitroGLYCERIN (NITROSTAT) 0.4 MG SL tablet Place 1 tablet under the tongue every 5 minutes as needed for Chest pain X 3 doses. If chest pain continues seek medical attention 25 tablet 3    glucose blood VI test strips (PHIL CONTOUR TEST) strip 1 each by In Vitro route daily 300 each 3    acetaminophen 650 MG TABS Take 650 mg by mouth every 4 hours as needed 120 tablet 3         Vitals:    10/27/20 0934   BP: 126/80   Pulse: 67   Resp: 16   Temp: 97.2 °F (36.2 °C)   TempSrc: Skin   SpO2: 98%   Weight: 216 lb 11.2 oz (98.3 kg)     Body mass index is 27.82 kg/m². Wt Readings from Last 3 Encounters:   10/27/20 216 lb 11.2 oz (98.3 kg)   10/26/20 277 lb 12.8 oz (126 kg)   10/20/20 288 lb 12.8 oz (131 kg)     BP Readings from Last 3 Encounters:   10/27/20 126/80   10/26/20 138/80   10/20/20 136/66        Patient was [facility] from [facility]/19/[facility] to 10/20/20 for STEMI.     Inpatient course: Discharge summary reviewed- see chart.    Current status: Denies CP, SOB or chest tightness      Review of Systems:  Review of Systems   Constitutional: Negative for chills and fever. HENT: Negative. Respiratory: Negative for cough and shortness of breath. Gastrointestinal: Negative for abdominal pain and nausea. Musculoskeletal: Positive for arthralgias and back pain. Skin: Negative for rash. Neurological: Negative for light-headedness. Psychiatric/Behavioral: Positive for sleep disturbance. Negative for dysphoric mood. All other systems reviewed and are negative. Physical Exam:  Physical Exam  Constitutional:       General: He is not in acute distress. Appearance: He is well-developed. HENT:      Right Ear: Tympanic membrane normal.      Left Ear: Tympanic membrane normal.      Nose: Nose normal.   Cardiovascular:      Rate and Rhythm: Normal rate and regular rhythm. Pulses: Normal pulses. Heart sounds: Normal heart sounds, S1 normal and S2 normal. No murmur. No S3 sounds. Pulmonary:      Effort: Pulmonary effort is normal.      Breath sounds: Normal breath sounds. No decreased breath sounds, wheezing or rhonchi. Abdominal:      General: Bowel sounds are normal.      Palpations: Abdomen is soft. Tenderness: There is no abdominal tenderness. Musculoskeletal:      Right shoulder: He exhibits decreased range of motion, tenderness, pain and decreased strength. Lumbar back: He exhibits decreased range of motion and pain. Legs:    Neurological:      Mental Status: He is alert and oriented to person, place, and time. Psychiatric:         Mood and Affect: Mood is anxious. Behavior: Behavior is slowed and withdrawn. Judgment: Judgment is impulsive. Initial post-discharge communication occurred between nurse care coordinator and patient on 10/21/20 - see documentation in chart: telephone encounter.     Assessment/Plan:  Belem Howell was seen today for follow-up from [facility] and flu vaccine. Diagnoses and all orders for this visit:    Controlled type 2 diabetes mellitus with chronic kidney disease, without long-term current use of insulin, unspecified CKD stage (HCC)  -     Hemoglobin A1C; Future    ST elevation myocardial infarction (STEMI) involving other coronary artery (HCC)    S/P angioplasty with stent    Coronary artery disease involving coronary bypass graft of native heart without angina pectoris    Paroxysmal atrial fibrillation (HCC)    ICD (implantable cardioverter-defibrillator) in place    Acute on chronic combined systolic and diastolic congestive heart failure (HCC)    Alcohol abuse    Stage 3a chronic kidney disease    Essential hypertension    Mixed hyperlipidemia    Anticoagulated on Coumadin          MDM:  Seen CARDIO yesterday 10/26/20. Follow up with [facility]. Follow up with [facility]. A1C now.      Has support in placed for alcohol abuse  RTO in 6 months    Diagnostic test results reviewed: inpatient labs, EKG, chest x-ray, echocardiogram and Cardiac CATH    Patient risk of morbidity and mortality: moderate    Medical Decision Making: moderate complexity

## 2020-10-27 NOTE — TELEPHONE ENCOUNTER
Pt seen today in our office for hospital follow up, discharged from [address] [place] [address] 10/20/2020. Per Dr. Estela Batres note patient is suppose to have his ICD checked and to schedule cardiac rehab. If you will please schedule and call the patient. Thanks !

## 2020-10-29 ENCOUNTER — NURSE ONLY (OUTPATIENT)
Dept: CARDIOLOGY CLINIC | Age: 55
End: 2020-10-29
Payer: COMMERCIAL

## 2020-10-29 PROCEDURE — 93283 PRGRMG EVAL IMPLANTABLE DFB: CPT | Performed by: NUCLEAR MEDICINE

## 2020-10-29 NOTE — PROGRESS NOTES
medtronic dual ICD in office   Post cardioversion on 10/18/20~at fib ~no further at fib events     . Ghazala Lavon Battery longevity:  3.4 years  Presenting rhythm  AS VS    Atrial impedance 456  RV impedance 380  Shock 67    P wave sensing 1.9  R wave sensing 13.6    0.9 % atrial paced  <0.1 % RV paced     Atrial threshold 0.5 V  at 0.4ms  RV threshold 0.75 V at 0.4ms    optivol WNL

## 2020-11-02 ENCOUNTER — CARE COORDINATION (OUTPATIENT)
Dept: CASE MANAGEMENT | Age: 55
End: 2020-11-02

## 2020-11-02 NOTE — CARE COORDINATION
[address] [facility] Transitions Follow Up Call    2020    Patient: Yessi Fuller  Patient : 1965   MRN: [id number]  Reason for Admission: STEMI  Discharge Date: 10/20/20 RARS: Readmission Risk Score: 30         Needs to be reviewed by the provider   Additional needs identified to be addressed with provider No  none  Discussed COVID-19 related testing which was not done at this time. Test results were not done. Method of communication with provider : none    Care Transition Nurse (CTN) contacted the patient by telephone to follow up. Verified name and  with patient as identifiers. Addressed changes since last contact:Pt denied any chest pain, nausea, sweating, palpitations, dizziness or SOB. Pt denied any pain, swelling or drainage at the right groin, cath insertion site. No new medication. Denies concerns or issues at this time. Discharged needs reviewed: none  Follow up appointment completed? Yes    Advance Care Planning:   Does patient have an Advance Directive:  reviewed and current. CTN reviewed discharge instructions, medical action plan and red flags with patient and discussed any barriers to care and/or understanding of plan of care after discharge. Discussed appropriate site of care based on symptoms and resources available to patient including: PCP. The patient agrees to contact the PCP office for questions related to their healthcare. Patients top risk factors for readmission: medical condition  Interventions to address risk factors: Obtained and reviewed discharge summary and/or continuity of care documents      Plan for follow-up call in 7-10 days based on severity of symptoms and risk factors. CTN provided contact information for future needs. Care Transitions Subsequent and Final Call    Subsequent and Final Calls  Care Transitions Interventions  Other Interventions:             Follow Up  Future Appointments   Date Time Provider Tate Hunt

## 2020-11-03 PROBLEM — I10 ESSENTIAL HYPERTENSION: Status: RESOLVED | Noted: 2020-11-03 | Resolved: 2020-11-03

## 2020-11-09 ENCOUNTER — CARE COORDINATION (OUTPATIENT)
Dept: CASE MANAGEMENT | Age: 55
End: 2020-11-09

## 2020-11-09 NOTE — CARE COORDINATION
[address] [facility] Transitions Follow Up Call    2020    Patient: Alicia Rainey  Patient : 1965   MRN: [id number]  Reason for Admission:STEMI   Discharge Date: 10/20/20 RARS: Readmission Risk Score: 30    First attempt to reach pt for the ANNA CABRAL sub call  Left message to call transition care nurse from [facility] [place] - JORGE ZUNIGA @ [phone].      Follow Up  Future Appointments   Date Time Provider Tate Hunt   2020  3:40 PM Subhash Ruiz MD [facility] [place]   3/9/2021  2:15 PM ISABELLA Gallo - CNP [facility]F [facility] [place]   2021 12:30 PM Luz Sheridan MD [facility] Heart [facility] [place]   2021  3:30 PM ISABELLA Lua   2021  3:30 PM SCHEDULE, SRPS PACER NURSE [facility] PACER BILL - Beatriz Resendiz RN  Care Transition Nurse  [phone]

## 2020-11-10 NOTE — DISCHARGE SUMMARY
Cardiology Progress Note        Patient:  Shivam Morgan  YOB: 1965  MRN: [id number]         Acct: [de-identified]  [facility] Date:  10/19/2020  Primary Cardiologist: Dr. Nithin Garcia  Seen by Dr. Carrie Lopez     Per prior cardiology consult note-     CHIEF COMPLAINT:  CP/VT        HPI: This is a pleasant 54 y. o. male presents with acute onset of chest pain at rest, associated with diaphoresis, and sob.  Felt his heart race.  Came to ED, found to have VT requiring DCCV.  Post ECG shows diffuse ST-depressions with aVR elevation.  Had D-ICD, has not fired.  0 events seen on most recent interrogation.  He is on Amiodarone and Mexiletine.  He has hx of Afib on Coumadin.  Compliant with medications.  Takes Plavix as well.  No bleeding.  BP is stable.  EF 40%, moderate LV hypokinesis.  Cr 1.9, Trop 0.151.  He notes he was drinking more alcohol that usual over the last 2 days.  No smoking        Subjective (Events in last 24 hours):      This am pt with severe headache - noted nitro IV at 140 mcg/min - for BP control   /      Tele Stable sr   Pt states anxious - chronic issue but elevated at present      Pt states he drinks crown \"every now and then\" - but this weekend he \"hit it hard\"  Still smoking 3/4-1 pack day      RT groin with Art sheath in place -- no ecchymosis or hematoma - PPP- neurovascular check WNL         10/20/2020  Pt doing well this am - no cardiac c/o   Tele SR no ectopy  RT groin site WNL - PPP     remains in low dose nicardipine      He wants to go home today      Objective:   /86   Pulse 69   Temp 97.8 °F (36.6 °C) (Oral)   Resp 14   Ht 6' 2\" (1.88 m)   Wt 288 lb 12.8 oz (131 kg)   SpO2 99%   BMI 37.08 kg/m²          TELEMETRY: SR no ectopy HR 60's     Physical Exam:  General Appearance: alert and oriented to person, place and time, in no acute distress  Cardiovascular: normal rate, regular rhythm, normal S1 and S2, no murmurs, rubs, clicks, or gallops, distal pulses intact, Pulmonary/Chest: clear to auscultation bilaterally- no wheezes, rales or rhonchi, normal air movement, no respiratory distress  Abdomen: soft, non-tender, non-distended, normal bowel sounds, no masses Extremities: no cyanosis, clubbing or edema, pulses present   Skin: warm and dry, no rash or erythema   Musculoskeletal: normal range of motion, no joint swelling, deformity or tenderness  Neurological: alert, oriented, normal speech, no focal findings or movement disorder noted     Medications:   Scheduled Medications    PHENobarbital  64.8 mg Oral BID     Followed by   Chely Mayer ON 10/21/2020] PHENobarbital  32.4 mg Oral BID    aspirin  81 mg Oral Daily    ticagrelor  90 mg Oral BID    atorvastatin  80 mg Oral Nightly    amiodarone  200 mg Oral Daily    [Held by provider] amLODIPine  5 mg Oral BID    hydrALAZINE  100 mg Oral TID    isosorbide mononitrate  120 mg Oral Daily    metoprolol tartrate  25 mg Oral BID    mexiletine  300 mg Oral 3 times per day    losartan  50 mg Oral Daily    sodium chloride flush  10 mL Intravenous 2 times per day    thiamine  100 mg Oral Daily    multivitamin  1 tablet Oral Daily    enoxaparin  40 mg Subcutaneous Daily    insulin lispro  0-12 Units Subcutaneous TID WC    insulin lispro  0-6 Units Subcutaneous Nightly    nicotine  1 patch Transdermal Daily        Infusions Meds    niCARdipine 10 mg/hr (10/20/20 0628)    dextrose            PRN Medications    HYDROcodone 5 mg - acetaminophen, 1 tablet, Q6H PRN  ALPRAZolam, 0.5 mg, BID PRN  sodium chloride flush, 10 mL, PRN  acetaminophen, 650 mg, Q4H PRN  glucose, 15 g, PRN  dextrose, 12.5 g, PRN  glucagon (rDNA), 1 mg, PRN  dextrose, 100 mL/hr, PRN           Diagnostics:  EK-OCT-2020 03:46:01 [facility]-ICU ROUTINE RETRIEVAL  Sinus rhythm with occasional Premature ventricular complexes  ST & T wave abnormality, consider lateral ischemia  Prolonged QT interval or tu fusion, consider myocardial disease, electrolyte imbalance, or drug effects  Abnormal ECG  When compared with ECG of 19-OCT-2020 00:34,  Premature ventricular complexes are now Present  Criteria for Septal infarct are no longer Present  ST no longer depressed in Inferior leads  ST less depressed in Anterolateral leads  T wave inversion less evident in Lateral leads . ..     Echo: pending   Electronically signed by Rosenda Hwang MD (Interpreting  [address] [place] [address]) on 09/25/2019 at 04:38 PM   ----------------------------------------------------------------      Findings      Mitral Valve   Structurally normal mitral valve.      Aortic Valve   Structurally normal aortic valve.   Aortic valve appears tricuspid.      Tricuspid Valve   Tricuspid valve is structurally normal.      Pulmonic Valve   The pulmonic valve was not well visualized .      Left Atrium   Mildly dilated left atrium.      Left Ventricle   Ejection fraction is visually estimated at 40%.   There was moderate global hypokinesis of the left ventricle.   Left Ventricular size is Mildly increased .      Pericardial Effusion   No evidence of any pericardial effusion.           Stress: Summary   This Nuclear Medicine study was abnormal .   inferior infarct with arlette infarct ischemia   abnormal LV function      Recommendation   Clinical correlation is recommended.      Signatures      ----------------------------------------------------------------   Electronically signed by Rosenda Hwang MD (Interpreting   Cardiologist) on 09/25/2019        Left Heart Cath:   Post-procedure Diagnosis/Findings:    SVG to Dx  - occluded  SVG to RCA -  Patent  LIMA to OM - patent  Native LAD 50% stenosis - FFR 0.76 - S/p 2 GENNARO  Large RI with 90% stenosis s/p 2 GENNAOR            Native RCA with diffuse disease but patent with competitive flow                                                                                    Mark Dennis MD FACC, FSCAI, RPVI  Electronically signed 10/19/2020 at 2:45 AM  Interventional Cardiology           Lab Data:     Cardiac Enzymes:  No results for input(s): CKTOTAL, CKMB, CKMBINDEX, TROPONINI in the last 72 hours.     CBC:         Lab Results   Component Value Date     WBC 10.2 10/19/2020     RBC 4.05 10/19/2020     RBC 4.75 11/03/2011     HGB 13.6 10/19/2020     HCT 40.7 10/19/2020      10/19/2020         CMP:          Lab Results   Component Value Date      10/19/2020     K 4.6 10/19/2020     K 4.1 02/03/2019      10/19/2020     CO2 19 10/19/2020     BUN 26 10/19/2020     CREATININE 1.8 10/19/2020     LABGLOM 39 10/19/2020     GLUCOSE 253 10/19/2020     CALCIUM 8.0 10/19/2020         Hepatic Function Panel:          Lab Results   Component Value Date     ALKPHOS 86 10/19/2020     ALT 21 10/19/2020     AST 27 10/19/2020     PROT 6.3 10/19/2020     BILITOT 0.4 10/19/2020     BILIDIR <0.2 10/19/2020     LABALBU 3.5 10/19/2020         Magnesium:          Lab Results   Component Value Date     MG 2.3 10/19/2020         PT/INR:          Lab Results   Component Value Date     INR 1.78 10/19/2020         HgBA1c:          Lab Results   Component Value Date     LABA1C 6.6 06/02/2020         FLP:          Lab Results   Component Value Date     TRIG 85 10/19/2020     HDL 42 10/19/2020     LDLCALC 162 10/19/2020         TSH:          Lab Results   Component Value Date     TSH 2.680 10/19/2020            Assessment:     Palpitations  / chest pain --- resolved   Noted stable VT on arrival to ER - s\p DCCV--> remains SR no ectopy      STEMI - lateral      S\p cardiac cath 10/19/2020:   SVG to Dx  - occluded  SVG to RCA -  Patent  LIMA to OM - patent  Native LAD 50% stenosis - FFR 0.76 - S/p 2 GENNARO  Large RI with 90% stenosis s/p 2 GENNARO            Native RCA with diffuse disease but patent with competitive flow                           Longstanding ICDMP EF 40% last echo - echo pending      Uncontrolled HTN -- wean off nicardipine      HLP  DM II -- HGB A1C 7.0 3/2020     CKD stage 3      Hx CABG LIMA to OM patent, SVG to Dx (occluded), SVG to RCA patent, 2014     Hx VT ablation 12/2014 -- on amiodarone and mextiline      Hx ETOH abuse - cessation discussed      Hx AFB - on coumadin / amiodarone            Plan:  · Ok to transfer to floor - Abilio Finney will be able to go home this bennett   · Almost off cardene   · He feels good - has been up   · Will see this afternoon and re- evaluate for home DC           Electronically signed by ISABELLA Solano CNP on 10/20/2020 at 9:57 AM        condition at DC - stable

## 2020-11-11 ENCOUNTER — CARE COORDINATION (OUTPATIENT)
Dept: CASE MANAGEMENT | Age: 55
End: 2020-11-11

## 2020-11-11 NOTE — CARE COORDINATION
[facility] Transitions Follow Up Call    2020    Patient: Lucinda Tovar  Patient : 1965   MRN: [id number]  Reason for Admission: STEMI  Discharge Date: 10/20/20 RARS: Readmission Risk Score: 30         Second attempt for transition call. Left message to return call.     Follow Up  Future Appointments   Date Time Provider Tate Hunt   2020  3:40 PM Cele Mayo MD [facility] [place]   3/9/2021  2:15 PM Jazmine Dalton APRN - CNP [facility] [place]   2021 12:30 PM Ash Hernandez MD [facility] [place]   2021  3:30 PM Rebecca Tanner APRN - CNP [facility] [place]   2021  3:30 PM SCHEDULE, SRPS PACER NURSE [facility] PACER BILL - Sandeep Blakely RN

## 2020-11-12 ENCOUNTER — CARE COORDINATION (OUTPATIENT)
Dept: CASE MANAGEMENT | Age: 55
End: 2020-11-12

## 2020-11-12 NOTE — CARE COORDINATION
[facility] Transitions Follow Up Call    2020    Patient: Alicia Rainey  Patient : 1965   MRN: [id number]  Reason for Admission:STEMI   Discharge Date: 10/20/20 RARS: Readmission Risk Score: 30       Needs to be reviewed by the provider   Additional needs identified to be addressed with provider No  none  Discussed COVID-19 related testing which was not done at this time. Test results were not done. Method of communication with provider : none    Care Transition Nurse (CTN) contacted the patient by telephone to follow up after admission on 10/19/20. Verified name and  with patient as identifiers. Addressed changes since last contact: symptom management-feeling okay  Discharged needs reviewed: none  Follow up appointment completed? Yes    Advance Care Planning:   Does patient have an Advance Directive:  reviewed and current. CTN reviewed discharge instructions, medical action plan and red flags with patient and discussed any barriers to care and/or understanding of plan of care after discharge. Discussed appropriate site of care based on symptoms and resources available to patient including: PCP and Specialist. The patient agrees to contact the PCP office for questions related to their healthcare. Patients top risk factors for readmission: medication management  Interventions to address risk factors: keep appts    Plan for follow-up call in 7-10 days based on severity of symptoms and risk factors. Plan for next call: symptom management-chest pain, SOB/  CTN provided contact information for future needs.     Care Transitions Subsequent and Final Call    Subsequent and Final Calls  Do you have any ongoing symptoms?:  No  Have your medications changed?:  No  Do you have any questions related to your medications?:  No  Do you currently have any active services?:  No  Are you currently active with any services?:  Outpatient/Community Services  Do you have any needs or concerns that I can assist you with?:  No  Identified Barriers:  None  Care Transitions Interventions  No Identified Needs  Other Interventions:          Called pt for the CORTEZ sub call. Pt stated he is doing okay. Pt denied any SOB, chest pain, sweating, dizziness or nausea and vomiting    Pt denied any needs or concerns. CTN will continue to follow.   Follow Up  Future Appointments   Date Time Provider Tate Marilee   12/21/2020  3:40 PM Ilene Talley MD [facility] [place]   3/9/2021  2:15 PM ISABELLA Gallo - CNP [facility] [place]   4/26/2021 12:30 PM [address], MD [facility] [place]   4/28/2021  3:30 PM ISABELLA Carcamo   11/4/2021  3:30 PM SCHEDULE, SARAH PACER NURSE [facility] PACER BILL Agosto RN  Care Transition Nurse  [phone]

## 2020-11-17 ENCOUNTER — CARE COORDINATION (OUTPATIENT)
Dept: CASE MANAGEMENT | Age: 55
End: 2020-11-17

## 2020-11-17 NOTE — CARE COORDINATION
[facility] Transitions Follow Up Call    2020    Patient: Kristi Gasca  Patient : 1965   MRN: [id number]  Reason for [address]   Discharge Date: 10/20/20 RARS: Readmission Risk Score: 30      Needs to be reviewed by the provider   Additional needs identified to be addressed with provider No  none  Discussed COVID-19 related testing which was not done at this time. Test results were not done. Method of communication with provider : none    Care Transition Nurse (CTN) contacted the patient by telephone to follow up after admission on 10/19/20 Verified name and  with patient as identifiers. Addressed changes since last contact: symptom management-no chest pain, SOB, dizziness  Discharged needs reviewed: none  Follow up appointment completed? Yes    Advance Care Planning:   Does patient have an Advance Directive:  reviewed and current. CTN reviewed discharge instructions, medical action plan and red flags with family and discussed any barriers to care and/or understanding of plan of care after discharge. Discussed appropriate site of care based on symptoms and resources available to patient including: PCP. The patient agrees to contact the PCP office for questions related to their healthcare. Patients top risk factors for readmission: level of motivation and medical condition  Interventions to address risk factors: call providers for concerns    Final call based on severity of symptoms and risk factors. CTN provided contact information for future needs.     Care Transitions Subsequent and Final Call    Subsequent and Final Calls  Do you have any ongoing symptoms?:  No  Have your medications changed?:  No  Do you have any questions related to your medications?:  No  Do you currently have any active services?:  Yes  Are you currently active with any services?:  Outpatient/Community Services  Do you have any needs or concerns that I can assist you with?: No  Identified Barriers:  None  Care Transitions Interventions  No Identified Needs  Other Interventions:        Called pt for the Final CORTEZ call. Pt stated he feels fine. Pt denied any chest pain, SOB, dizziness, or palpitations. Pt denied any needs or concerns. Pt informed this is the final transition of care call. Instructed to call the primary care provider for any concerns. Please call during the regular office hours if possible, for urgent problems,  there is a provider on call. Call the office & follow the prompts. The answering service is able to make appointments for the following day. Call 911 for emergencies.     Follow Up  Future Appointments   Date Time Provider Tate Marilee   12/21/2020  3:40 PM Mecca Escobar MD [facility] [place]   3/9/2021  2:15 PM ISABELLA Gallo - CNP [facility] [place]   4/26/2021 12:30 PM Maynard Brunner, MD [facility] [place]   4/28/2021  3:30 PM ISABELLA Sinha   11/4/2021  3:30 PM SCHEDULE, SRPS PACER NURSE [facility] PACER BILL Cooper RN  Care Transition Nurse  [phone]

## 2020-12-02 RX ORDER — HYDRALAZINE HYDROCHLORIDE 100 MG/1
TABLET, FILM COATED ORAL
Qty: 270 TABLET | Refills: 3 | Status: SHIPPED | OUTPATIENT
Start: 2020-12-02 | End: 2021-06-30 | Stop reason: SDUPTHER

## 2020-12-08 ENCOUNTER — PROCEDURE VISIT (OUTPATIENT)
Dept: CARDIOLOGY CLINIC | Age: 55
End: 2020-12-08
Payer: COMMERCIAL

## 2020-12-08 PROCEDURE — 93297 REM INTERROG DEV EVAL ICPMS: CPT | Performed by: NUCLEAR MEDICINE

## 2020-12-08 PROCEDURE — G2066 INTER DEVC REMOTE 30D: HCPCS | Performed by: NUCLEAR MEDICINE

## 2020-12-21 RX ORDER — LOSARTAN POTASSIUM 50 MG/1
50 TABLET ORAL DAILY
Qty: 90 TABLET | Refills: 1 | Status: SHIPPED | OUTPATIENT
Start: 2020-12-21 | End: 2021-06-21

## 2020-12-28 RX ORDER — MEXILETINE HYDROCHLORIDE 150 MG/1
CAPSULE ORAL
Qty: 540 CAPSULE | Refills: 3 | Status: SHIPPED | OUTPATIENT
Start: 2020-12-28 | End: 2022-02-21 | Stop reason: SDUPTHER

## 2020-12-28 NOTE — TELEPHONE ENCOUNTER
Tennille Wang called requesting a refill on the following medications:  Requested Prescriptions     Pending Prescriptions Disp Refills    mexiletine (MEXITIL) 150 MG capsule 540 capsule 1     Sig: TAKE 2 CAPSULES THREE TIMES DAILY FOR ATRIAL FIBRILLATION     Pharmacy verified:[facility]      Date of last visit: 10/26/20  Date of next visit (if applicable): Visit date not found

## 2020-12-28 NOTE — TELEPHONE ENCOUNTER
[place]  called requesting a refill on the following medications:  Requested Prescriptions     Pending Prescriptions Disp Refills    atorvastatin (LIPITOR) 80 MG tablet 30 tablet 3     Sig: Take 1 tablet by mouth nightly     Pharmacy verified: [facility] on [address] [place] [address] Drive, [address]  . pv      Date of last visit: 10/27/2020  Date of next visit (if applicable): 7/95/6809

## 2020-12-29 RX ORDER — ATORVASTATIN CALCIUM 80 MG/1
80 TABLET, FILM COATED ORAL NIGHTLY
Qty: 30 TABLET | Refills: 3 | Status: SHIPPED | OUTPATIENT
Start: 2020-12-29 | End: 2021-02-01 | Stop reason: SDUPTHER

## 2021-01-12 ENCOUNTER — PROCEDURE VISIT (OUTPATIENT)
Dept: CARDIOLOGY CLINIC | Age: 56
End: 2021-01-12
Payer: COMMERCIAL

## 2021-01-12 DIAGNOSIS — Z95.810 S/P ICD (INTERNAL CARDIAC DEFIBRILLATOR) PROCEDURE: ICD-10-CM

## 2021-01-12 DIAGNOSIS — I50.20 SYSTOLIC CONGESTIVE HEART FAILURE, NYHA CLASS 2, UNSPECIFIED CONGESTIVE HEART FAILURE CHRONICITY (HCC): Primary | ICD-10-CM

## 2021-01-12 PROCEDURE — G2066 INTER DEVC REMOTE 30D: HCPCS | Performed by: NUCLEAR MEDICINE

## 2021-01-12 PROCEDURE — 93297 REM INTERROG DEV EVAL ICPMS: CPT | Performed by: NUCLEAR MEDICINE

## 2021-01-20 RX ORDER — WARFARIN SODIUM 5 MG/1
TABLET ORAL
Qty: 180 TABLET | Refills: 5 | Status: SHIPPED | OUTPATIENT
Start: 2021-01-20 | End: 2022-04-12

## 2021-01-28 DIAGNOSIS — F41.3 OTHER MIXED ANXIETY DISORDERS: ICD-10-CM

## 2021-01-28 RX ORDER — ALPRAZOLAM 0.5 MG/1
TABLET ORAL
Qty: 30 TABLET | Refills: 5 | Status: SHIPPED | OUTPATIENT
Start: 2021-01-28 | End: 2021-09-07 | Stop reason: SDUPTHER

## 2021-02-01 RX ORDER — ATORVASTATIN CALCIUM 80 MG/1
80 TABLET, FILM COATED ORAL NIGHTLY
Qty: 30 TABLET | Refills: 2 | Status: SHIPPED | OUTPATIENT
Start: 2021-02-01 | End: 2021-08-30

## 2021-02-08 RX ORDER — BUMETANIDE 2 MG/1
2 TABLET ORAL DAILY
Qty: 90 TABLET | Refills: 3 | Status: SHIPPED | OUTPATIENT
Start: 2021-02-08 | End: 2022-08-10 | Stop reason: SDUPTHER

## 2021-02-11 DIAGNOSIS — I25.810 CORONARY ARTERY DISEASE INVOLVING CORONARY BYPASS GRAFT OF NATIVE HEART WITHOUT ANGINA PECTORIS: ICD-10-CM

## 2021-02-11 RX ORDER — ISOSORBIDE MONONITRATE 120 MG/1
120 TABLET, EXTENDED RELEASE ORAL DAILY
Qty: 90 TABLET | Refills: 3 | Status: SHIPPED | OUTPATIENT
Start: 2021-02-11 | End: 2021-02-12

## 2021-02-12 DIAGNOSIS — I25.810 CORONARY ARTERY DISEASE INVOLVING CORONARY BYPASS GRAFT OF NATIVE HEART WITHOUT ANGINA PECTORIS: ICD-10-CM

## 2021-02-12 RX ORDER — ISOSORBIDE MONONITRATE 120 MG/1
120 TABLET, EXTENDED RELEASE ORAL DAILY
Qty: 90 TABLET | Refills: 3 | Status: SHIPPED | OUTPATIENT
Start: 2021-02-12 | End: 2022-04-06 | Stop reason: SDUPTHER

## 2021-02-16 ENCOUNTER — PROCEDURE VISIT (OUTPATIENT)
Dept: CARDIOLOGY CLINIC | Age: 56
End: 2021-02-16
Payer: COMMERCIAL

## 2021-02-16 DIAGNOSIS — Z95.810 S/P ICD (INTERNAL CARDIAC DEFIBRILLATOR) PROCEDURE: Primary | ICD-10-CM

## 2021-02-16 PROCEDURE — 93296 REM INTERROG EVL PM/IDS: CPT | Performed by: NUCLEAR MEDICINE

## 2021-02-16 PROCEDURE — 93295 DEV INTERROG REMOTE 1/2/MLT: CPT | Performed by: NUCLEAR MEDICINE

## 2021-03-09 ENCOUNTER — TELEPHONE (OUTPATIENT)
Dept: CARDIOLOGY CLINIC | Age: 56
End: 2021-03-09

## 2021-03-09 NOTE — LETTER
[facility]  [address] [address]  Phone: [phone]  Fax: [phone]    ISABELLA Dinero CNP        March 11, 2021    [address] [place] [address]      Dear Blanca Grimes: We tried to contact you in regards to your appointment with our office on 3/9/21 that we missed you at. Your health and medical care are important to us. Please call our office back to reschedule this appointment at [phone]. If you have any questions or concerns, please don't hesitate to call.     Sincerely,        ISABELLA Dinero CNP

## 2021-03-24 ENCOUNTER — PROCEDURE VISIT (OUTPATIENT)
Dept: CARDIOLOGY CLINIC | Age: 56
End: 2021-03-24
Payer: COMMERCIAL

## 2021-03-24 DIAGNOSIS — I50.20 SYSTOLIC CONGESTIVE HEART FAILURE, NYHA CLASS 2, UNSPECIFIED CONGESTIVE HEART FAILURE CHRONICITY (HCC): Primary | ICD-10-CM

## 2021-03-24 PROCEDURE — 93297 REM INTERROG DEV EVAL ICPMS: CPT | Performed by: NUCLEAR MEDICINE

## 2021-03-24 PROCEDURE — G2066 INTER DEVC REMOTE 30D: HCPCS | Performed by: NUCLEAR MEDICINE

## 2021-03-24 NOTE — PROGRESS NOTES
DR Vladimir Verde PT  [employer] OPTIVOL REMOTE  BATTERY 3.1 YRS REMAINING  POSSIBLE SVT EPISODE / PLEASE SEE 3/9/21  OPTIVOL WNL

## 2021-04-03 DIAGNOSIS — E11.22 CONTROLLED TYPE 2 DIABETES MELLITUS WITH CHRONIC KIDNEY DISEASE, WITHOUT LONG-TERM CURRENT USE OF INSULIN, UNSPECIFIED CKD STAGE (HCC): ICD-10-CM

## 2021-04-19 ENCOUNTER — TELEPHONE (OUTPATIENT)
Dept: FAMILY MEDICINE CLINIC | Age: 56
End: 2021-04-19

## 2021-04-19 NOTE — TELEPHONE ENCOUNTER
L/M for pt to call office. Called patient for pre visit planning. Pt has appt on Wednesday 4/28/2021 at 3:30pm.  Please remind pt of appt date and time and check to see if they had their lab work done.

## 2021-04-26 ENCOUNTER — TELEPHONE (OUTPATIENT)
Dept: CARDIOLOGY CLINIC | Age: 56
End: 2021-04-26

## 2021-04-26 NOTE — LETTER
[facility]  [address] [address]  Phone: [phone]  Fax: [phone]      April 26, 2021     Tino Hopkins [address] [address]      Dear Tomasz Sandoval:    I am writing you because I have been informed of your missed appointment(s). We care about you and the management of your healthcare and want to make sure that you follow up as recommended. We're sorry you were unable to keep your appointment and hope that you are doing well. We would like to continue treating your healthcare needs. Please call the office to let us know your plans for treatment and to reschedule your appointment.      Sincerely,        Sol Swift MD

## 2021-04-27 ENCOUNTER — PROCEDURE VISIT (OUTPATIENT)
Dept: CARDIOLOGY CLINIC | Age: 56
End: 2021-04-27
Payer: COMMERCIAL

## 2021-04-27 DIAGNOSIS — I50.20 SYSTOLIC CONGESTIVE HEART FAILURE, NYHA CLASS 2, UNSPECIFIED CONGESTIVE HEART FAILURE CHRONICITY (HCC): Primary | ICD-10-CM

## 2021-04-27 PROCEDURE — 93297 REM INTERROG DEV EVAL ICPMS: CPT | Performed by: NUCLEAR MEDICINE

## 2021-04-27 PROCEDURE — G2066 INTER DEVC REMOTE 30D: HCPCS | Performed by: NUCLEAR MEDICINE

## 2021-05-06 NOTE — TELEPHONE ENCOUNTER
Michelle Newyb called requesting a refill on the following medications:  Requested Prescriptions     Pending Prescriptions Disp Refills    ticagrelor (BRILINTA) 90 MG TABS tablet 60 tablet 2     Sig: Take 1 tablet by mouth 2 times daily     Pharmacy verified[facility]      Date of last visit:   Date of next visit (if applicable): 9/2/9864

## 2021-06-01 ENCOUNTER — PROCEDURE VISIT (OUTPATIENT)
Dept: CARDIOLOGY CLINIC | Age: 56
End: 2021-06-01
Payer: COMMERCIAL

## 2021-06-01 DIAGNOSIS — Z95.810 PRESENCE OF COMBINATION INTERNAL CARDIAC DEFIBRILLATOR (ICD) AND PACEMAKER: Primary | ICD-10-CM

## 2021-06-01 PROCEDURE — 93296 REM INTERROG EVL PM/IDS: CPT | Performed by: NUCLEAR MEDICINE

## 2021-06-01 PROCEDURE — 93295 DEV INTERROG REMOTE 1/2/MLT: CPT | Performed by: NUCLEAR MEDICINE

## 2021-06-01 NOTE — PROGRESS NOTES
[employer] Med[employer] Dual ICD --- [employer] every month  Pt of [place]    Battery 2.7 years     Presenting rhythm AS VS    A Impedance 437  RV Impedance 342    RV shock 60    P wave sensing 1.8  R wave sensing 8.8    A Threshold --  A Amplitude 2.25 @ 0.40  RV Thresholds 0.625 @ 0.40  RV Amplitude 2.0 @ 0.40    A Paced 1.4%  V Paced <0.1%    Programmed Mode DDI 40    Afib [place] 0%    Episodes: none    Optivol was elevated but now WNL

## 2021-06-03 ENCOUNTER — OFFICE VISIT (OUTPATIENT)
Dept: CARDIOLOGY CLINIC | Age: 56
End: 2021-06-03
Payer: COMMERCIAL

## 2021-06-03 VITALS
DIASTOLIC BLOOD PRESSURE: 82 MMHG | BODY MASS INDEX: 33.62 KG/M2 | SYSTOLIC BLOOD PRESSURE: 140 MMHG | WEIGHT: 262 LBS | HEIGHT: 74 IN | HEART RATE: 72 BPM

## 2021-06-03 DIAGNOSIS — I10 ESSENTIAL HYPERTENSION: ICD-10-CM

## 2021-06-03 DIAGNOSIS — I25.810 CORONARY ARTERY DISEASE INVOLVING CORONARY BYPASS GRAFT OF NATIVE HEART WITHOUT ANGINA PECTORIS: Primary | ICD-10-CM

## 2021-06-03 DIAGNOSIS — I48.21 PERMANENT ATRIAL FIBRILLATION (HCC): ICD-10-CM

## 2021-06-03 PROCEDURE — 99213 OFFICE O/P EST LOW 20 MIN: CPT | Performed by: NUCLEAR MEDICINE

## 2021-06-03 NOTE — PROGRESS NOTES
[address] [facility] [address]  [address]  [address]  [facility] [address]  Dept: [phone]  Dept Fax: [phone]  Loc: [phone]    Visit Date: 6/3/2021    [facility] Jarvis is a 64 y.o. 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    Anxiety disorder     Arthritis     Atrial fibrillation ([address]     CAD (coronary artery disease) 3/25/2014    s/p CABG in may 2014    Cardiomyopathy ([address]     Chronic kidney disease     Depression     Diabetes mellitus ([address]     Drug abuse ([address]     hx of cacaine abuse, stopped abusing drugs in 2012    GERD (gastroesophageal reflux disease)     H/O cardiac catheterization 4/30/2014    Hyperlipidemia     Hypertension     Intradural extramedullary spinal tumor     Lumbar spine tumor     Medtronic dual icd      Neuromuscular disorder ([address]     Other disorders of kidney and ureter in diseases classified elsewhere     Paroxysmal atrial fibrillation ([address] 7/22/2014    V tach ([address]     V-tach [facility])     s/p ablation in dec 2014      Past Surgical History:   Procedure Laterality Date    CARDIAC CATHETERIZATION  3/20/14     [facility]    CARDIAC DEFIBRILLATOR PLACEMENT  10/13/2015    [employer] [place], MRI CONDITIONAL ICD    CORONARY ARTERY BYPASS GRAFT  5-9-14    3 bypass    EKG 12-LEAD  7/17/2015         OTHER SURGICAL HISTORY Left 10/21/14    Left Bursectomy, I & D Left Elbow - Dr. Savi Gan LAMINECTOMY,>2 MNKAVYA,GARYDC N/A 1/16/2018 LUMBAR AND SACRAL LAMINECTOMY, REMOVAL OF INTRASPINAL TUMORS performed by Ana Villegas MD at [address] harvest from legs     Family History   Problem Relation Age of Onset    Diabetes Mother     High Blood Pressure Mother     Stroke Mother     Diabetes Father     Heart Disease Father     High Blood Pressure Father     Heart Disease Sister         CABG    Diabetes Maternal Grandmother     Diabetes Maternal Grandfather     Heart Disease Paternal Grandfather      Social History     Tobacco Use    Smoking status: Former Smoker     Packs/day: 1.00     Years: 32.00     Pack years: 32.00     Types: Cigarettes     Start date: 1980     Quit date: 10/19/2020     Years since quittin.6    Smokeless tobacco: Former User   Substance Use Topics    Alcohol use:  Yes     Alcohol/week: 0.0 standard drinks     Comment: occasional      Current Outpatient Medications   Medication Sig Dispense Refill    ticagrelor (BRILINTA) 90 MG TABS tablet Take 1 tablet by mouth 2 times daily 60 tablet 0    metFORMIN (GLUCOPHAGE) 500 MG tablet TAKE 1 TABLET BY MOUTH TWICE DAILY WITH MEALS 180 tablet 3    isosorbide mononitrate (IMDUR) 120 MG extended release tablet TAKE 1 TABLET BY MOUTH DAILY 90 tablet 3    bumetanide (BUMEX) 2 MG tablet TAKE 1 TABLET BY MOUTH DAILY 90 tablet 3    ALPRAZolam (XANAX) 0.5 MG tablet TAKE 1 TABLET BY MOUTH AT BEDTIME FOR ANXIETY 30 tablet 5    warfarin (COUMADIN) 5 MG tablet USE AS DIRECTED BY COUMADIN CLINIC 180 tablet 5    mexiletine (MEXITIL) 150 MG capsule TAKE 2 CAPSULES THREE TIMES DAILY FOR ATRIAL FIBRILLATION 540 capsule 3    metoprolol tartrate (LOPRESSOR) 25 MG tablet Take 1 tablet by mouth 2 times daily 180 tablet 3    hydrALAZINE (APRESOLINE) 100 MG tablet TAKE 1 TABLET BY MOUTH THREE TIMES DAILY 270 tablet 3    linagliptin (TRADJENTA) 5 MG tablet TAKE 1 TABLET BY MOUTH DAILY 90 tablet 3    potassium chloride (KLOR-CON M) 20 MEQ extended release tablet TAKE 1 TABLET BY MOUTH DAILY 30 tablet 11    amiodarone (CORDARONE) 200 MG tablet Take 1 tablet by mouth daily 90 tablet 3    amLODIPine (NORVASC) 5 MG tablet Take 1 tablet by mouth 2 times daily 180 tablet 3    nitroGLYCERIN (NITROSTAT) 0.4 MG SL tablet Place 1 tablet under the tongue every 5 minutes as needed for Chest pain X 3 doses. If chest pain continues seek medical attention 25 tablet 3    glucose blood VI test strips (PHIL CONTOUR TEST) strip 1 each by In Vitro route daily 300 each 3    acetaminophen 650 MG TABS Take 650 mg by mouth every 4 hours as needed 120 tablet 3    atorvastatin (LIPITOR) 80 MG tablet Take 1 tablet by mouth nightly (Patient not taking: Reported on 6/3/2021) 30 tablet 2    losartan (COZAAR) 50 MG tablet TAKE 1 TABLET BY MOUTH DAILY 90 tablet 1     No current facility-administered medications for this visit.      Allergies   Allergen Reactions    Latex     Pcn [Penicillins] Hives    Zoloft [Sertraline Hcl] Anxiety     Worsened anxiety     Health Maintenance   Topic Date Due    Pneumococcal 0-64 years Vaccine (1 of 4 - PCV13) Never done    COVID-19 Vaccine (1) Never done    Hepatitis B vaccine (1 of 3 - Risk 3-dose series) Never done    Colon cancer screen colonoscopy  Never done    Diabetic retinal exam  12/15/2016    Low dose CT lung screening  05/20/2020    Diabetic foot exam  12/04/2020    A1C test (Diabetic or Prediabetic)  06/02/2021    Flu vaccine (Season Ended) 09/01/2021    Lipid screen  10/19/2021    TSH testing  10/19/2021    Potassium monitoring  10/19/2021    Creatinine monitoring  10/19/2021    DTaP/Tdap/Td vaccine (2 - Td or Tdap) 06/25/2027    Hepatitis C screen  Completed    HIV screen  Completed    Hepatitis A vaccine  Aged Out    Hib vaccine  Aged Out    Meningococcal (ACWY) vaccine  Aged Out       Subjective:  Review of Systems  General:   No fever, no chills, No fatigue or weight loss  Pulmonary:    No dyspnea, no wheezing  Cardiac:    Denies recent chest pain,   GI:     No nausea or vomiting, no abdominal pain  Neuro:    No dizziness or light headedness,   Musculoskeletal:  No recent active issues  Extremities:   No edema, no obvious claudication       Objective:  Physical Exam  BP (!) 140/82   Pulse 72   Ht 6' 2\" (1.88 m)   Wt 262 lb (118.8 kg)   BMI 33.64 kg/m²   General:   Well developed, well nourished  Lungs:   Clear to auscultation  Heart:    Normal S1 S2, Slight murmur. no rubs, no gallops  Abdomen:   Soft, non tender, no organomegalies, positive bowel sounds  Extremities:   No edema, no cyanosis, good peripheral pulses  Neurological:   Awake, alert, oriented. No obvious focal deficits  Musculoskelatal:  No obvious deformities    Assessment:      Diagnosis Orders   1. Coronary artery disease involving coronary bypass graft of native heart without angina pectoris     2. Permanent atrial fibrillation ([facility]     3. Essential hypertension     as above  Cardiac fair for now     Plan:  No follow-ups on file. As above  Continue risk factor modification and medical management  Thank you for allowing me to participate in the care of your patient. Please don't hesitate to contact me regarding any further issues related to the patient care    Orders Placed:  No orders of the defined types were placed in this encounter. Medications Prescribed:  No orders of the defined types were placed in this encounter. Discussed use, benefit, and side effects of prescribed medications. All patient questions answered. Pt voicedunderstanding. Instructed to continue current medications, diet and exercise. Continue risk factor modification and medical management. Patient agreed with treatment plan. Follow up as directed.     Electronically signedby Hammad Small MD on 6/3/2021 at 11:13 AM

## 2021-06-11 NOTE — TELEPHONE ENCOUNTER
Tom Knapp called requesting a refill on the following medications:  Requested Prescriptions     Pending Prescriptions Disp Refills    ticagrelor (BRILINTA) 90 MG TABS tablet 60 tablet 0     Sig: Take 1 tablet by mouth 2 times daily     [facility]arm[facility]      Date of last visit: 06/03/21  Date of next visit (if applicable): Visit date not found      PT REQUESTING 90 DAY SUPPLY

## 2021-06-21 RX ORDER — LOSARTAN POTASSIUM 50 MG/1
50 TABLET ORAL DAILY
Qty: 30 TABLET | Refills: 0 | Status: SHIPPED | OUTPATIENT
Start: 2021-06-21

## 2021-06-21 NOTE — TELEPHONE ENCOUNTER
Has not been see since 6/23/20. No shows for last two appts. No current appt scheduled. 30 day script pending. I will send letter letting him know that he will need an appt to continue to receive his meds.

## 2021-06-30 DIAGNOSIS — I10 ESSENTIAL HYPERTENSION: ICD-10-CM

## 2021-06-30 RX ORDER — HYDRALAZINE HYDROCHLORIDE 100 MG/1
TABLET, FILM COATED ORAL
Qty: 270 TABLET | Refills: 3 | Status: SHIPPED | OUTPATIENT
Start: 2021-06-30 | End: 2022-06-14 | Stop reason: SDUPTHER

## 2021-07-06 ENCOUNTER — PROCEDURE VISIT (OUTPATIENT)
Dept: CARDIOLOGY CLINIC | Age: 56
End: 2021-07-06
Payer: COMMERCIAL

## 2021-07-06 DIAGNOSIS — I50.20 SYSTOLIC CONGESTIVE HEART FAILURE, NYHA CLASS 2, UNSPECIFIED CONGESTIVE HEART FAILURE CHRONICITY (HCC): Primary | ICD-10-CM

## 2021-07-06 PROCEDURE — 93297 REM INTERROG DEV EVAL ICPMS: CPT | Performed by: NUCLEAR MEDICINE

## 2021-07-06 PROCEDURE — G2066 INTER DEVC REMOTE 30D: HCPCS | Performed by: NUCLEAR MEDICINE

## 2021-08-10 DIAGNOSIS — I10 ESSENTIAL HYPERTENSION: ICD-10-CM

## 2021-08-10 RX ORDER — AMLODIPINE BESYLATE 5 MG/1
5 TABLET ORAL 2 TIMES DAILY
Qty: 180 TABLET | Refills: 1 | Status: ON HOLD | OUTPATIENT
Start: 2021-08-10 | End: 2022-04-20

## 2021-08-10 RX ORDER — AMIODARONE HYDROCHLORIDE 200 MG/1
200 TABLET ORAL DAILY
Qty: 90 TABLET | Refills: 3 | Status: SHIPPED | OUTPATIENT
Start: 2021-08-10

## 2021-08-11 ENCOUNTER — PROCEDURE VISIT (OUTPATIENT)
Dept: CARDIOLOGY CLINIC | Age: 56
End: 2021-08-11
Payer: COMMERCIAL

## 2021-08-11 DIAGNOSIS — I50.20 SYSTOLIC CONGESTIVE HEART FAILURE, NYHA CLASS 2, UNSPECIFIED CONGESTIVE HEART FAILURE CHRONICITY (HCC): Primary | ICD-10-CM

## 2021-08-11 PROCEDURE — G2066 INTER DEVC REMOTE 30D: HCPCS | Performed by: INTERNAL MEDICINE

## 2021-08-11 PROCEDURE — 93297 REM INTERROG DEV EVAL ICPMS: CPT | Performed by: INTERNAL MEDICINE

## 2021-09-07 DIAGNOSIS — F41.3 OTHER MIXED ANXIETY DISORDERS: ICD-10-CM

## 2021-09-07 RX ORDER — ALPRAZOLAM 0.5 MG/1
TABLET ORAL
Qty: 30 TABLET | Refills: 5 | OUTPATIENT
Start: 2021-09-07 | End: 2022-03-06

## 2021-09-07 RX ORDER — ALPRAZOLAM 0.5 MG/1
TABLET ORAL
Qty: 30 TABLET | Refills: 0 | Status: SHIPPED | OUTPATIENT
Start: 2021-09-07 | End: 2021-10-06 | Stop reason: SDUPTHER

## 2021-09-07 NOTE — TELEPHONE ENCOUNTER
Requested Prescriptions     Pending Prescriptions Disp Refills    ALPRAZolam (XANAX) 0.5 MG tablet 30 tablet 5       If no call back patient will check with [address] at 2 pm today.

## 2021-09-07 NOTE — TELEPHONE ENCOUNTER
Pt called back stating there was no rx at the pharmacy. Pt is already scheduled for an appt with you on 9/14/21. He is completely out of his medication. If no call back he will check with the pharmacy after 3:30pm. Refill if appropriate.

## 2021-09-14 ENCOUNTER — PROCEDURE VISIT (OUTPATIENT)
Dept: CARDIOLOGY CLINIC | Age: 56
End: 2021-09-14
Payer: COMMERCIAL

## 2021-09-14 DIAGNOSIS — Z95.810 S/P ICD (INTERNAL CARDIAC DEFIBRILLATOR) PROCEDURE: Primary | ICD-10-CM

## 2021-09-14 PROCEDURE — 93296 REM INTERROG EVL PM/IDS: CPT | Performed by: NUCLEAR MEDICINE

## 2021-09-14 PROCEDURE — 93295 DEV INTERROG REMOTE 1/2/MLT: CPT | Performed by: NUCLEAR MEDICINE

## 2021-09-14 NOTE — PROGRESS NOTES
Dr Charis Ruiz pt   medtronic dual icd remote   Battery 2.5 yrs remaining  Atrial impedence 437  Vent impedence 323  rv 62    p waves 1.5  rv waves 9.8    No atrial thredhold obtained   Vent threshold 0.75 @ 0.4  Vent amplitude 2 @ 0.4  DDI 40  A paced 5.8%  V paced <0.1%  optivol is rising but wnl

## 2021-10-06 ENCOUNTER — OFFICE VISIT (OUTPATIENT)
Dept: FAMILY MEDICINE CLINIC | Age: 56
End: 2021-10-06
Payer: COMMERCIAL

## 2021-10-06 DIAGNOSIS — Z95.810 ICD (IMPLANTABLE CARDIOVERTER-DEFIBRILLATOR) IN PLACE: ICD-10-CM

## 2021-10-06 DIAGNOSIS — Z12.12 ENCOUNTER FOR COLORECTAL CANCER SCREENING: ICD-10-CM

## 2021-10-06 DIAGNOSIS — F41.3 OTHER MIXED ANXIETY DISORDERS: ICD-10-CM

## 2021-10-06 DIAGNOSIS — E78.2 MIXED HYPERLIPIDEMIA: ICD-10-CM

## 2021-10-06 DIAGNOSIS — I10 ESSENTIAL HYPERTENSION: ICD-10-CM

## 2021-10-06 DIAGNOSIS — Z95.1 HX OF CABG: ICD-10-CM

## 2021-10-06 DIAGNOSIS — Z12.11 ENCOUNTER FOR COLORECTAL CANCER SCREENING: ICD-10-CM

## 2021-10-06 DIAGNOSIS — E11.22 CONTROLLED TYPE 2 DIABETES MELLITUS WITH CHRONIC KIDNEY DISEASE, WITHOUT LONG-TERM CURRENT USE OF INSULIN, UNSPECIFIED CKD STAGE (HCC): ICD-10-CM

## 2021-10-06 DIAGNOSIS — Z00.00 WELL ADULT EXAM: Primary | ICD-10-CM

## 2021-10-06 DIAGNOSIS — I25.810 CORONARY ARTERY DISEASE INVOLVING CORONARY BYPASS GRAFT OF NATIVE HEART WITHOUT ANGINA PECTORIS: ICD-10-CM

## 2021-10-06 PROCEDURE — 99396 PREV VISIT EST AGE 40-64: CPT | Performed by: NURSE PRACTITIONER

## 2021-10-06 RX ORDER — ALPRAZOLAM 0.5 MG/1
TABLET ORAL
Qty: 30 TABLET | Refills: 5 | Status: SHIPPED | OUTPATIENT
Start: 2021-10-06 | End: 2022-04-25 | Stop reason: SDUPTHER

## 2021-10-06 RX ORDER — POTASSIUM CHLORIDE 20 MEQ/1
20 TABLET, EXTENDED RELEASE ORAL DAILY
Qty: 90 TABLET | Refills: 3 | Status: SHIPPED | OUTPATIENT
Start: 2021-10-06 | End: 2022-10-03

## 2021-10-06 SDOH — ECONOMIC STABILITY: FOOD INSECURITY: WITHIN THE PAST 12 MONTHS, THE FOOD YOU BOUGHT JUST DIDN'T LAST AND YOU DIDN'T HAVE MONEY TO GET MORE.: NEVER TRUE

## 2021-10-06 SDOH — ECONOMIC STABILITY: FOOD INSECURITY: WITHIN THE PAST 12 MONTHS, YOU WORRIED THAT YOUR FOOD WOULD RUN OUT BEFORE YOU GOT MONEY TO BUY MORE.: NEVER TRUE

## 2021-10-06 ASSESSMENT — ENCOUNTER SYMPTOMS
ABDOMINAL PAIN: 0
SHORTNESS OF BREATH: 0
NAUSEA: 0
COUGH: 0
BACK PAIN: 1

## 2021-10-06 ASSESSMENT — SOCIAL DETERMINANTS OF HEALTH (SDOH): HOW HARD IS IT FOR YOU TO PAY FOR THE VERY BASICS LIKE FOOD, HOUSING, MEDICAL CARE, AND HEATING?: NOT HARD AT ALL
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
## 2021-10-07 VITALS
SYSTOLIC BLOOD PRESSURE: 156 MMHG | BODY MASS INDEX: 34.38 KG/M2 | HEART RATE: 68 BPM | WEIGHT: 267.8 LBS | DIASTOLIC BLOOD PRESSURE: 92 MMHG

## 2021-10-19 ENCOUNTER — PROCEDURE VISIT (OUTPATIENT)
Dept: CARDIOLOGY CLINIC | Age: 56
End: 2021-10-19
Payer: COMMERCIAL

## 2021-10-19 DIAGNOSIS — I50.20 SYSTOLIC CONGESTIVE HEART FAILURE, NYHA CLASS 2, UNSPECIFIED CONGESTIVE HEART FAILURE CHRONICITY (HCC): Primary | ICD-10-CM

## 2021-10-19 PROCEDURE — G2066 INTER DEVC REMOTE 30D: HCPCS | Performed by: NUCLEAR MEDICINE

## 2021-10-19 PROCEDURE — 93297 REM INTERROG DEV EVAL ICPMS: CPT | Performed by: NUCLEAR MEDICINE

## 2021-10-28 ENCOUNTER — TELEPHONE (OUTPATIENT)
Dept: CARDIOLOGY CLINIC | Age: 56
End: 2021-10-28

## 2021-10-28 NOTE — TELEPHONE ENCOUNTER
Pt called stating his pharmacist told him he should not be taking both Amiodarone and Mexiletine. Pt wants to know if he is still okay to take these two meds or if he needs to d/c one. Please advise.

## 2021-11-12 ENCOUNTER — HOSPITAL ENCOUNTER (OUTPATIENT)
Age: 56
Discharge: HOME | End: 2021-11-12
Payer: COMMERCIAL

## 2021-11-12 DIAGNOSIS — Z00.00 WELL ADULT EXAM: ICD-10-CM

## 2021-11-12 LAB
ALBUMIN SERPL-MCNC: 3.6 G/DL (ref 3.5–5.1)
ALP BLD-CCNC: 92 U/L (ref 38–126)
ALT SERPL-CCNC: 29 U/L (ref 11–66)
ANION GAP SERPL CALCULATED.3IONS-SCNC: 13 MEQ/L (ref 8–16)
AST SERPL-CCNC: 32 U/L (ref 5–40)
AVERAGE GLUCOSE: 183 MG/DL (ref 70–126)
BILIRUB SERPL-MCNC: 0.5 MG/DL (ref 0.3–1.2)
BUN BLDV-MCNC: 33 MG/DL (ref 7–22)
CALCIUM SERPL-MCNC: 8.4 MG/DL (ref 8.5–10.5)
CHLORIDE BLD-SCNC: 101 MEQ/L (ref 98–111)
CHOLESTEROL, TOTAL: 148 MG/DL (ref 100–199)
CO2: 20 MEQ/L (ref 23–33)
CREAT SERPL-MCNC: 2.5 MG/DL (ref 0.4–1.2)
CREATININE, URINE: 105.1 MG/DL
ERYTHROCYTE [DISTWIDTH] IN BLOOD BY AUTOMATED COUNT: 14.6 % (ref 11.5–14.5)
ERYTHROCYTE [DISTWIDTH] IN BLOOD BY AUTOMATED COUNT: 53.2 FL (ref 35–45)
GFR SERPL CREATININE-BSD FRML MDRD: 27 ML/MIN/1.73M2
GLUCOSE BLD-MCNC: 177 MG/DL (ref 70–108)
HBA1C MFR BLD: 8.1 % (ref 4.4–6.4)
HCT VFR BLD CALC: 35.5 % (ref 42–52)
HDLC SERPL-MCNC: 48 MG/DL
HEMOGLOBIN: 11.8 GM/DL (ref 14–18)
LDL CHOLESTEROL CALCULATED: 73 MG/DL
MCH RBC QN AUTO: 33.5 PG (ref 26–33)
MCHC RBC AUTO-ENTMCNC: 33.2 GM/DL (ref 32.2–35.5)
MCV RBC AUTO: 100.9 FL (ref 80–94)
MICROALBUMIN UR-MCNC: 220.23 MG/DL
MICROALBUMIN/CREAT UR-RTO: 2095 MG/G (ref 0–30)
PLATELET # BLD: 145 THOU/MM3 (ref 130–400)
PMV BLD AUTO: 9.7 FL (ref 9.4–12.4)
POTASSIUM SERPL-SCNC: 4.2 MEQ/L (ref 3.5–5.2)
PROSTATE SPECIFIC ANTIGEN: 0.4 NG/ML (ref 0–1)
RBC # BLD: 3.52 MILL/MM3 (ref 4.7–6.1)
SODIUM BLD-SCNC: 134 MEQ/L (ref 135–145)
TOTAL PROTEIN: 6 G/DL (ref 6.1–8)
TRIGL SERPL-MCNC: 134 MG/DL (ref 0–199)
TSH SERPL DL<=0.05 MIU/L-ACNC: 3.29 UIU/ML (ref 0.4–4.2)
WBC # BLD: 6.4 THOU/MM3 (ref 4.8–10.8)

## 2021-11-12 PROCEDURE — 82043 UR ALBUMIN QUANTITATIVE: CPT

## 2021-11-12 PROCEDURE — 85027 COMPLETE CBC AUTOMATED: CPT

## 2021-11-12 PROCEDURE — 36415 COLL VENOUS BLD VENIPUNCTURE: CPT

## 2021-11-12 PROCEDURE — G0103 PSA SCREENING: HCPCS

## 2021-11-12 PROCEDURE — 83036 HEMOGLOBIN GLYCOSYLATED A1C: CPT

## 2021-11-12 PROCEDURE — 80061 LIPID PANEL: CPT

## 2021-11-12 PROCEDURE — 80053 COMPREHEN METABOLIC PANEL: CPT

## 2021-11-12 PROCEDURE — 84443 ASSAY THYROID STIM HORMONE: CPT

## 2021-11-22 ENCOUNTER — OFFICE VISIT (OUTPATIENT)
Dept: FAMILY MEDICINE CLINIC | Age: 56
End: 2021-11-22
Payer: COMMERCIAL

## 2021-11-22 VITALS
SYSTOLIC BLOOD PRESSURE: 152 MMHG | RESPIRATION RATE: 16 BRPM | HEART RATE: 68 BPM | BODY MASS INDEX: 33.5 KG/M2 | WEIGHT: 260.9 LBS | DIASTOLIC BLOOD PRESSURE: 86 MMHG

## 2021-11-22 DIAGNOSIS — N18.32 STAGE 3B CHRONIC KIDNEY DISEASE (HCC): ICD-10-CM

## 2021-11-22 DIAGNOSIS — E11.22 CONTROLLED TYPE 2 DIABETES MELLITUS WITH CHRONIC KIDNEY DISEASE, WITHOUT LONG-TERM CURRENT USE OF INSULIN, UNSPECIFIED CKD STAGE (HCC): Primary | ICD-10-CM

## 2021-11-22 DIAGNOSIS — E11.21 DIABETIC NEPHROPATHY WITH PROTEINURIA (HCC): ICD-10-CM

## 2021-11-22 DIAGNOSIS — Z95.1 HX OF CABG: ICD-10-CM

## 2021-11-22 DIAGNOSIS — F10.10 ALCOHOL ABUSE: ICD-10-CM

## 2021-11-22 DIAGNOSIS — E78.2 MIXED HYPERLIPIDEMIA: ICD-10-CM

## 2021-11-22 DIAGNOSIS — I50.22 CHRONIC SYSTOLIC CHF (CONGESTIVE HEART FAILURE) (HCC): ICD-10-CM

## 2021-11-22 DIAGNOSIS — I25.810 CORONARY ARTERY DISEASE INVOLVING CORONARY BYPASS GRAFT OF NATIVE HEART WITHOUT ANGINA PECTORIS: ICD-10-CM

## 2021-11-22 DIAGNOSIS — E11.22 CONTROLLED TYPE 2 DIABETES MELLITUS WITH CHRONIC KIDNEY DISEASE, WITHOUT LONG-TERM CURRENT USE OF INSULIN, UNSPECIFIED CKD STAGE (HCC): ICD-10-CM

## 2021-11-22 DIAGNOSIS — D68.9 COAGULOPATHY (HCC): ICD-10-CM

## 2021-11-22 DIAGNOSIS — N17.9 AKI (ACUTE KIDNEY INJURY) (HCC): ICD-10-CM

## 2021-11-22 DIAGNOSIS — Z95.810 ICD (IMPLANTABLE CARDIOVERTER-DEFIBRILLATOR) IN PLACE: ICD-10-CM

## 2021-11-22 DIAGNOSIS — I10 ESSENTIAL HYPERTENSION: ICD-10-CM

## 2021-11-22 PROCEDURE — 99215 OFFICE O/P EST HI 40 MIN: CPT | Performed by: NURSE PRACTITIONER

## 2021-11-22 PROCEDURE — 3052F HG A1C>EQUAL 8.0%<EQUAL 9.0%: CPT | Performed by: NURSE PRACTITIONER

## 2021-11-22 RX ORDER — LANCETS
1 EACH MISCELLANEOUS 2 TIMES DAILY
Qty: 100 EACH | Refills: 3 | Status: SHIPPED | OUTPATIENT
Start: 2021-11-22 | End: 2021-11-23

## 2021-11-22 RX ORDER — INSULIN GLARGINE 100 [IU]/ML
20 INJECTION, SOLUTION SUBCUTANEOUS NIGHTLY
Qty: 5 PEN | Refills: 3 | Status: SHIPPED | OUTPATIENT
Start: 2021-11-22 | End: 2022-07-12 | Stop reason: SDUPTHER

## 2021-11-22 RX ORDER — BLOOD-GLUCOSE METER
1 EACH MISCELLANEOUS 2 TIMES DAILY
Qty: 1 KIT | Refills: 0 | Status: SHIPPED | OUTPATIENT
Start: 2021-11-22

## 2021-11-22 RX ORDER — BLOOD SUGAR DIAGNOSTIC
1 STRIP MISCELLANEOUS 2 TIMES DAILY
Qty: 100 EACH | Refills: 3 | Status: SHIPPED | OUTPATIENT
Start: 2021-11-22 | End: 2021-11-24

## 2021-11-22 ASSESSMENT — ENCOUNTER SYMPTOMS
BACK PAIN: 1
COUGH: 0
ABDOMINAL PAIN: 0
SHORTNESS OF BREATH: 0
NAUSEA: 0

## 2021-11-22 ASSESSMENT — PATIENT HEALTH QUESTIONNAIRE - PHQ9
SUM OF ALL RESPONSES TO PHQ QUESTIONS 1-9: 0
2. FEELING DOWN, DEPRESSED OR HOPELESS: 0
1. LITTLE INTEREST OR PLEASURE IN DOING THINGS: 0
SUM OF ALL RESPONSES TO PHQ9 QUESTIONS 1 & 2: 0

## 2021-11-22 NOTE — Clinical Note
Notify patient review of his last BP they have been running higher which can also contribute to his worsening kidney function. I sent in a BP medication to take with his current regime to help lower.
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
## 2021-11-22 NOTE — PATIENT INSTRUCTIONS
You may receive a survey about your visit with us today. The feedback from our patients helps us identify what is working well and where the service to all patients can be enhanced. Thank you! Patient Education        Learning About Low-Carbohydrate Diets  What is a low-carbohydrate diet? A low-carbohydrate (or \"low-carb\") diet limits foods and drinks that have carbohydrates. This includes grains, fruits, milk and yogurt, and starchy vegetables like potatoes, beans, and corn. It also avoids foods and drinks that have added sugar. Instead, low-carb diets include foods that are high in protein and fat. Why might you follow a low-carb diet? Low-carb diets may be used for a variety of reasons, such as for weight loss. People who have diabetes may use a low-carb diet to help manage their blood sugar levels. What should you do before you start the diet? Talk to your doctor before you try any diet. This is even more important if you have health problems like kidney disease, heart disease, or diabetes. Your doctor may suggest that you meet with a registered dietitian. A dietitian can help you make an eating plan that works for you. What foods do you eat on a low-carb diet? On a low-carb diet, you choose foods that are high in protein and fat. Examples of these are:  · Meat, poultry, and fish. · Eggs. · Nuts, such as walnuts, pecans, almonds, and peanuts. · Peanut butter and other nut butters. · Tofu. · Avocado. · Nolberto Lights. · Non-starchy vegetables like broccoli, cauliflower, green beans, mushrooms, peppers, lettuce, and spinach. · Unsweetened non-dairy milks like almond milk and coconut milk. · Cheese, cottage cheese, and cream cheese. Current as of: December 17, 2020               Content Version: 13.0  © 2006-2021 [employer]. Care instructions adapted under license by [facility] ([facility]).  If you have questions about a medical condition or this instruction, always ask your healthcare professional. [address] any warranty or liability for your use of this information.

## 2021-11-23 ENCOUNTER — TELEPHONE (OUTPATIENT)
Dept: FAMILY MEDICINE CLINIC | Age: 56
End: 2021-11-23

## 2021-11-23 DIAGNOSIS — E11.22 CONTROLLED TYPE 2 DIABETES MELLITUS WITH CHRONIC KIDNEY DISEASE, WITHOUT LONG-TERM CURRENT USE OF INSULIN, UNSPECIFIED CKD STAGE (HCC): ICD-10-CM

## 2021-11-23 RX ORDER — LANCETS
EACH MISCELLANEOUS
Qty: 200 EACH | Refills: 3 | Status: SHIPPED | OUTPATIENT
Start: 2021-11-23

## 2021-11-23 RX ORDER — DOXAZOSIN 2 MG/1
2 TABLET ORAL DAILY
Qty: 90 TABLET | Refills: 3 | Status: SHIPPED | OUTPATIENT
Start: 2021-11-23 | End: 2022-07-20

## 2021-11-23 NOTE — TELEPHONE ENCOUNTER
----- Message from ISABELLA Rodriguez CNP sent at 11/23/2021  8:35 AM EST -----  Notify patient review of his last BP they have been running higher which can also contribute to his worsening kidney function. I sent in a BP medication to take with his current regime to help lower.

## 2021-11-24 RX ORDER — BLOOD SUGAR DIAGNOSTIC
STRIP MISCELLANEOUS
Qty: 150 STRIP | Refills: 3 | Status: SHIPPED | OUTPATIENT
Start: 2021-11-24 | End: 2022-05-19 | Stop reason: SDUPTHER

## 2021-12-28 ENCOUNTER — PROCEDURE VISIT (OUTPATIENT)
Dept: CARDIOLOGY CLINIC | Age: 56
End: 2021-12-28
Payer: COMMERCIAL

## 2021-12-28 ENCOUNTER — TELEPHONE (OUTPATIENT)
Dept: CARDIOLOGY CLINIC | Age: 56
End: 2021-12-28

## 2021-12-28 DIAGNOSIS — I50.20 SYSTOLIC CONGESTIVE HEART FAILURE, NYHA CLASS 2, UNSPECIFIED CONGESTIVE HEART FAILURE CHRONICITY (HCC): Primary | ICD-10-CM

## 2021-12-28 PROCEDURE — G2066 INTER DEVC REMOTE 30D: HCPCS | Performed by: NUCLEAR MEDICINE

## 2021-12-28 PROCEDURE — 93297 REM INTERROG DEV EVAL ICPMS: CPT | Performed by: NUCLEAR MEDICINE

## 2021-12-28 NOTE — TELEPHONE ENCOUNTER
[facility] Dual [facility] Optivol  Pt of [place]aki    Battery 2.9 years    Optivol elevated. LMOM for patient to call clinic.

## 2021-12-28 NOTE — PROGRESS NOTES
[facility] Medtronic Dual ICD Optivol  Pt of Baki    Battery 2.9 years    Optivol elevated. LMOM for patient to call clinic. Will address in phone encounter.

## 2021-12-29 NOTE — TELEPHONE ENCOUNTER
Pt returned call. Has not been checking weight. No leg swelling. Slight bloating in abdomen. Slight SOB. Confirmed patient taking bumex 2mg daily. Stated he has been watching his salt intake. Doesn't eat too much salt.

## 2022-01-20 ENCOUNTER — NURSE ONLY (OUTPATIENT)
Dept: LAB | Age: 57
End: 2022-01-20

## 2022-01-20 DIAGNOSIS — N18.30 STAGE 3 CHRONIC KIDNEY DISEASE, UNSPECIFIED WHETHER STAGE 3A OR 3B CKD (HCC): ICD-10-CM

## 2022-01-20 DIAGNOSIS — N18.30 STAGE 3 CHRONIC KIDNEY DISEASE, UNSPECIFIED WHETHER STAGE 3A OR 3B CKD (HCC): Primary | ICD-10-CM

## 2022-01-20 LAB
ALBUMIN SERPL-MCNC: 4.1 G/DL (ref 3.5–5.1)
ALP BLD-CCNC: 101 U/L (ref 38–126)
ALT SERPL-CCNC: 28 U/L (ref 11–66)
ANION GAP SERPL CALCULATED.3IONS-SCNC: 9 MEQ/L (ref 8–16)
AST SERPL-CCNC: 35 U/L (ref 5–40)
BACTERIA: ABNORMAL
BILIRUB SERPL-MCNC: 0.4 MG/DL (ref 0.3–1.2)
BILIRUBIN URINE: NEGATIVE
BLOOD, URINE: NEGATIVE
BUN BLDV-MCNC: 42 MG/DL (ref 7–22)
CALCIUM SERPL-MCNC: 8.8 MG/DL (ref 8.5–10.5)
CASTS: ABNORMAL /LPF
CASTS: ABNORMAL /LPF
CHARACTER, URINE: CLEAR
CHLORIDE BLD-SCNC: 104 MEQ/L (ref 98–111)
CO2: 25 MEQ/L (ref 23–33)
COLOR: YELLOW
CREAT SERPL-MCNC: 2.7 MG/DL (ref 0.4–1.2)
CREATININE URINE: 92 MG/DL
CREATININE, URINE: 92 MG/DL
CRYSTALS: ABNORMAL
EPITHELIAL CELLS, UA: ABNORMAL /HPF
GFR SERPL CREATININE-BSD FRML MDRD: 25 ML/MIN/1.73M2
GLUCOSE BLD-MCNC: 83 MG/DL (ref 70–108)
GLUCOSE, URINE: 500 MG/DL
KETONES, URINE: NEGATIVE
LEUKOCYTE EST, POC: NEGATIVE
MICROALBUMIN UR-MCNC: 231.07 MG/DL
MICROALBUMIN/CREAT UR-RTO: 2512 MG/G (ref 0–30)
MISCELLANEOUS LAB TEST RESULT: ABNORMAL
NITRITE, URINE: NEGATIVE
PH UA: 5.5 (ref 5–9)
POTASSIUM SERPL-SCNC: 4.7 MEQ/L (ref 3.5–5.2)
PROT/CREAT RATIO, UR: 3.61
PROTEIN UA: 300 MG/DL
PROTEIN, URINE: 332.1 MG/DL
RBC URINE: ABNORMAL /HPF
RENAL EPITHELIAL, UA: ABNORMAL
SODIUM BLD-SCNC: 138 MEQ/L (ref 135–145)
SPECIFIC GRAVITY UA: 1.02 (ref 1–1.03)
TOTAL PROTEIN: 6.3 G/DL (ref 6.1–8)
UROBILINOGEN, URINE: 0.2 EU/DL (ref 0–1)
WBC UA: ABNORMAL /HPF
YEAST: ABNORMAL

## 2022-01-24 ENCOUNTER — OFFICE VISIT (OUTPATIENT)
Dept: NEPHROLOGY | Age: 57
End: 2022-01-24
Payer: COMMERCIAL

## 2022-01-24 VITALS
HEART RATE: 57 BPM | TEMPERATURE: 98.3 F | BODY MASS INDEX: 35.95 KG/M2 | OXYGEN SATURATION: 98 % | SYSTOLIC BLOOD PRESSURE: 160 MMHG | DIASTOLIC BLOOD PRESSURE: 88 MMHG | WEIGHT: 280 LBS

## 2022-01-24 DIAGNOSIS — I10 HTN (HYPERTENSION), BENIGN: ICD-10-CM

## 2022-01-24 DIAGNOSIS — N18.4 CKD (CHRONIC KIDNEY DISEASE) STAGE 4, GFR 15-29 ML/MIN (HCC): Primary | ICD-10-CM

## 2022-01-24 DIAGNOSIS — E11.21 DIABETIC NEPHROPATHY ASSOCIATED WITH TYPE 2 DIABETES MELLITUS (HCC): ICD-10-CM

## 2022-01-24 PROCEDURE — 99214 OFFICE O/P EST MOD 30 MIN: CPT | Performed by: INTERNAL MEDICINE

## 2022-01-24 NOTE — PROGRESS NOTES
cabg harvest from legs        Medications :     Outpatient Medications Marked as Taking for the 1/24/22 encounter (Office Visit) with Orquidea Espinoza MD   Medication Sig Dispense Refill    ONETOUCH ULTRA strip USE AS DIRECTED TWICE DAILY 150 strip 3    ONE TOUCH ULTRASOFT LANCETS MISC USE AS DIRECTED TWICE DAILY.  200 each 3    doxazosin (CARDURA) 2 MG tablet Take 1 tablet by mouth daily 90 tablet 3    Blood Glucose Monitoring Suppl (ONE TOUCH ULTRA 2) w/Device KIT 1 kit by Does not apply route 2 times daily 1 kit 0    insulin glargine (LANTUS SOLOSTAR) 100 UNIT/ML injection pen Inject 20 Units into the skin nightly Increase 2 units until morning sugar > 150 5 pen 3    Insulin Pen Needle 31G X 8 MM MISC 1 each by Does not apply route daily 100 each 3    linagliptin (TRADJENTA) 5 MG tablet TAKE 1 TABLET BY MOUTH DAILY 90 tablet 3    potassium chloride (KLOR-CON M) 20 MEQ extended release tablet Take 1 tablet by mouth daily 90 tablet 3    ALPRAZolam (XANAX) 0.5 MG tablet TAKE 1 TABLET BY MOUTH AT BEDTIME FOR ANXIETY 30 tablet 5    atorvastatin (LIPITOR) 80 MG tablet TAKE 1 TABLET BY MOUTH EVERY NIGHT 30 tablet 5    metoprolol tartrate (LOPRESSOR) 25 MG tablet Take 1 tablet by mouth 2 times daily 180 tablet 2    amLODIPine (NORVASC) 5 MG tablet Take 1 tablet by mouth 2 times daily 180 tablet 1    amiodarone (CORDARONE) 200 MG tablet Take 1 tablet by mouth daily 90 tablet 3    hydrALAZINE (APRESOLINE) 100 MG tablet TAKE 1 TABLET BY MOUTH THREE TIMES DAILY 270 tablet 3    losartan (COZAAR) 50 MG tablet TAKE 1 TABLET BY MOUTH DAILY 30 tablet 0    ticagrelor (BRILINTA) 90 MG TABS tablet Take 1 tablet by mouth 2 times daily 180 tablet 3    isosorbide mononitrate (IMDUR) 120 MG extended release tablet TAKE 1 TABLET BY MOUTH DAILY 90 tablet 3    bumetanide (BUMEX) 2 MG tablet TAKE 1 TABLET BY MOUTH DAILY 90 tablet 3    warfarin (COUMADIN) 5 MG tablet USE AS DIRECTED BY COUMADIN CLINIC 180 tablet 5    mexiletine (MEXITIL) 150 MG capsule TAKE 2 CAPSULES THREE TIMES DAILY FOR ATRIAL FIBRILLATION 540 capsule 3    nitroGLYCERIN (NITROSTAT) 0.4 MG SL tablet Place 1 tablet under the tongue every 5 minutes as needed for Chest pain X 3 doses. If chest pain continues seek medical attention 25 tablet 3    glucose blood VI test strips (PHIL CONTOUR TEST) strip 1 each by In Vitro route daily 300 each 3    acetaminophen 650 MG TABS Take 650 mg by mouth every 4 hours as needed 120 tablet 3       Vitals     BP (!) 160/88 (Site: Left Upper Arm, Position: Sitting, Cuff Size: Medium Adult)   Pulse 57   Temp 98.3 °F (36.8 °C)   Wt 280 lb (127 kg)   SpO2 98%   BMI 35.95 kg/m²  Wt Readings from Last 3 Encounters:   01/24/22 280 lb (127 kg)   11/22/21 260 lb 14.4 oz (118.3 kg)   10/06/21 267 lb 12.8 oz (121.5 kg)        Physical Exam     General -- no distress  Lungs -- clear  Heart -- S1, S2 heard, JVD - no  Abdomen - soft, non-tender  Extremities -- no edema  CNS - awake and alert    Labs, Radiology and Tests    Labs -    5/18 9/18 11/19 6/20 1/22       Potassium 4.4 4.1 4.1 4.1 4.7       BUN 27 27 29 22 42       Creatinine 1.4 1.8 1.7 1.6 2.7       eGFR 53 40 42 45 25                   UPCR +(UA) 1.3 6.03 2.5 3.61       UMCR 1.5 [id number]                     Serologies -- 9/18  SRIDEVI -- negative  Hepatitis B -- negative  Hepatitis C -- negative  Complements C3 / C4 -- normal  Kappa / Lambda Ratio -- 3.22/2.89-1.11  Serum Protein Electropharesis -- normal pattern      Renal Ultrasound Scan -- not done       CT scan of the abdomen - 5/18  1. Findings of constipation. Findings suggestive of cystitis. 2. Moderate bilateral perinephric stranding that has worsened since the study, suggesting possibility of acute bilateral pyelonephritis. Clinical correlation recommended. Assessment    1.  Renal - patient has CKD stage III most likely secondary to diabetic nephropathy and essential hypertension.  -Overall renal function appears to have much worsened - mostly progressive CKD . -Patient does have significant proteinuria mostly related to diabetes. - may consider a renal biopsy . Renal US    2. essential hypertension running slightly high   3. Proteinuria secondary to diabetes and serologic workup is negative. 4. Diabetes mellitus overall stable last A1c 8.1 on 11/2021  5. BPH on Flomax is urology   6. meds reviewed and D/W patient  7. FU in 3 months    Tests and orders placed this Encounter     Orders Placed This Encounter   Procedures    US RENAL LIMITED    Comprehensive Metabolic Panel    Urinalysis    Microalbumin / Creatinine Urine Ratio    Vitamin D 25 Hydroxy    CBC    Phosphorus    PTH, Intact       Letitia Vora M.D  [facility].

## 2022-01-26 ENCOUNTER — HOSPITAL ENCOUNTER (OUTPATIENT)
Dept: ULTRASOUND IMAGING | Age: 57
Discharge: HOME | End: 2022-01-26
Payer: COMMERCIAL

## 2022-01-26 DIAGNOSIS — N18.4 CKD (CHRONIC KIDNEY DISEASE) STAGE 4, GFR 15-29 ML/MIN (HCC): ICD-10-CM

## 2022-01-26 PROCEDURE — 76770 US EXAM ABDO BACK WALL COMP: CPT

## 2022-02-02 ENCOUNTER — PROCEDURE VISIT (OUTPATIENT)
Dept: CARDIOLOGY CLINIC | Age: 57
End: 2022-02-02
Payer: COMMERCIAL

## 2022-02-02 ENCOUNTER — TELEPHONE (OUTPATIENT)
Dept: CARDIOLOGY CLINIC | Age: 57
End: 2022-02-02

## 2022-02-02 DIAGNOSIS — I50.20 SYSTOLIC CONGESTIVE HEART FAILURE, NYHA CLASS 2, UNSPECIFIED CONGESTIVE HEART FAILURE CHRONICITY (HCC): Primary | ICD-10-CM

## 2022-02-02 PROCEDURE — G2066 INTER DEVC REMOTE 30D: HCPCS | Performed by: NUCLEAR MEDICINE

## 2022-02-02 PROCEDURE — 93297 REM INTERROG DEV EVAL ICPMS: CPT | Performed by: NUCLEAR MEDICINE

## 2022-02-02 NOTE — TELEPHONE ENCOUNTER
LM FOR PT TO CALL THIS OFFICE / PT OF DR Parikh Come / DOES NOT SEE CHF CLINIC    OPTIVOL IS OFF THE CHART/ ANY S/S OF CHF? ??
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
## 2022-02-06 ENCOUNTER — TELEPHONE (OUTPATIENT)
Dept: FAMILY MEDICINE CLINIC | Age: 57
End: 2022-02-06

## 2022-02-06 NOTE — TELEPHONE ENCOUNTER
----- Message from Noemy Mayen sent at 2/4/2022 10:05 AM EST -----  Subject: Message to Provider    QUESTIONS  Information for Provider? Patient had visit yesterday virtual and not sure   where to go for a covid test . please cb pt there were no notes to explain   for him. cb pt   ---------------------------------------------------------------------------  --------------  CALL BACK INFO  What is the best way for the office to contact you? OK to leave message on   voicemail  Preferred Call Back Phone Number? [phone]  ---------------------------------------------------------------------------  --------------  SCRIPT ANSWERS  Relationship to Patient?  Self

## 2022-02-07 ENCOUNTER — APPOINTMENT (OUTPATIENT)
Dept: GENERAL RADIOLOGY | Age: 57
DRG: 308 | End: 2022-02-07
Payer: COMMERCIAL

## 2022-02-07 ENCOUNTER — HOSPITAL ENCOUNTER (INPATIENT)
Age: 57
LOS: 10 days | Discharge: HOME | DRG: 308 | End: 2022-02-17
Attending: EMERGENCY MEDICINE | Admitting: INTERNAL MEDICINE
Payer: COMMERCIAL

## 2022-02-07 DIAGNOSIS — F10.10 ALCOHOL ABUSE: ICD-10-CM

## 2022-02-07 DIAGNOSIS — Z45.02 AICD DISCHARGE: Primary | ICD-10-CM

## 2022-02-07 DIAGNOSIS — I48.91 ATRIAL FIBRILLATION WITH RVR (HCC): ICD-10-CM

## 2022-02-07 LAB
ALBUMIN SERPL-MCNC: 3.4 G/DL (ref 3.5–5.1)
ALP BLD-CCNC: 101 U/L (ref 38–126)
ALT SERPL-CCNC: 22 U/L (ref 11–66)
ANION GAP SERPL CALCULATED.3IONS-SCNC: 17 MEQ/L (ref 8–16)
AST SERPL-CCNC: 28 U/L (ref 5–40)
AVERAGE GLUCOSE: 150 MG/DL (ref 70–126)
BASOPHILS # BLD: 1.2 %
BASOPHILS ABSOLUTE: 0.1 THOU/MM3 (ref 0–0.1)
BILIRUB SERPL-MCNC: 0.5 MG/DL (ref 0.3–1.2)
BILIRUBIN DIRECT: < 0.2 MG/DL (ref 0–0.3)
BUN BLDV-MCNC: 31 MG/DL (ref 7–22)
CALCIUM SERPL-MCNC: 7.2 MG/DL (ref 8.5–10.5)
CHLORIDE BLD-SCNC: 109 MEQ/L (ref 98–111)
CO2: 15 MEQ/L (ref 23–33)
CREAT SERPL-MCNC: 2.1 MG/DL (ref 0.4–1.2)
EKG Q-T INTERVAL: 304 MS
EKG QRS DURATION: 86 MS
EKG QTC CALCULATION (BAZETT): 462 MS
EKG T AXIS: 106 DEGREES
EKG VENTRICULAR RATE: 139 BPM
EOSINOPHIL # BLD: 1.9 %
EOSINOPHILS ABSOLUTE: 0.2 THOU/MM3 (ref 0–0.4)
ERYTHROCYTE [DISTWIDTH] IN BLOOD BY AUTOMATED COUNT: 13.7 % (ref 11.5–14.5)
ERYTHROCYTE [DISTWIDTH] IN BLOOD BY AUTOMATED COUNT: 50.4 FL (ref 35–45)
FLU A ANTIGEN: NEGATIVE
FLU B ANTIGEN: NEGATIVE
GFR SERPL CREATININE-BSD FRML MDRD: 33 ML/MIN/1.73M2
GLUCOSE BLD-MCNC: 253 MG/DL (ref 70–108)
GLUCOSE BLD-MCNC: 428 MG/DL (ref 70–108)
HBA1C MFR BLD: 7 % (ref 4.4–6.4)
HCT VFR BLD CALC: 36.6 % (ref 42–52)
HEMOGLOBIN: 12.2 GM/DL (ref 14–18)
IMMATURE GRANS (ABS): 0.04 THOU/MM3 (ref 0–0.07)
IMMATURE GRANULOCYTES: 0.5 %
INR BLD: 1.05 (ref 0.85–1.13)
LYMPHOCYTES # BLD: 11.8 %
LYMPHOCYTES ABSOLUTE: 1 THOU/MM3 (ref 1–4.8)
MCH RBC QN AUTO: 33.3 PG (ref 26–33)
MCHC RBC AUTO-ENTMCNC: 33.3 GM/DL (ref 32.2–35.5)
MCV RBC AUTO: 100 FL (ref 80–94)
MONOCYTES # BLD: 6.2 %
MONOCYTES ABSOLUTE: 0.5 THOU/MM3 (ref 0.4–1.3)
NUCLEATED RED BLOOD CELLS: 0 /100 WBC
OSMOLALITY CALCULATION: 306.1 MOSMOL/KG (ref 275–300)
PLATELET # BLD: 130 THOU/MM3 (ref 130–400)
PMV BLD AUTO: 10 FL (ref 9.4–12.4)
POTASSIUM REFLEX MAGNESIUM: 4.4 MEQ/L (ref 3.5–5.2)
PRO-BNP: 6494 PG/ML (ref 0–900)
RBC # BLD: 3.66 MILL/MM3 (ref 4.7–6.1)
SARS-COV-2, NAAT: NOT  DETECTED
SEG NEUTROPHILS: 78.4 %
SEGMENTED NEUTROPHILS ABSOLUTE COUNT: 6.4 THOU/MM3 (ref 1.8–7.7)
SODIUM BLD-SCNC: 141 MEQ/L (ref 135–145)
T4 FREE: 1.12 NG/DL (ref 0.93–1.76)
TOTAL PROTEIN: 5.7 G/DL (ref 6.1–8)
TROPONIN T: 0.17 NG/ML
TROPONIN T: 0.24 NG/ML
TSH SERPL DL<=0.05 MIU/L-ACNC: 5.75 UIU/ML (ref 0.4–4.2)
WBC # BLD: 8.2 THOU/MM3 (ref 4.8–10.8)

## 2022-02-07 PROCEDURE — 6370000000 HC RX 637 (ALT 250 FOR IP): Performed by: PHYSICIAN ASSISTANT

## 2022-02-07 PROCEDURE — 6360000002 HC RX W HCPCS

## 2022-02-07 PROCEDURE — 84439 ASSAY OF FREE THYROXINE: CPT

## 2022-02-07 PROCEDURE — 83036 HEMOGLOBIN GLYCOSYLATED A1C: CPT

## 2022-02-07 PROCEDURE — 73060 X-RAY EXAM OF HUMERUS: CPT

## 2022-02-07 PROCEDURE — 87635 SARS-COV-2 COVID-19 AMP PRB: CPT

## 2022-02-07 PROCEDURE — 87804 INFLUENZA ASSAY W/OPTIC: CPT

## 2022-02-07 PROCEDURE — 99285 EMERGENCY DEPT VISIT HI MDM: CPT

## 2022-02-07 PROCEDURE — 82948 REAGENT STRIP/BLOOD GLUCOSE: CPT

## 2022-02-07 PROCEDURE — 93005 ELECTROCARDIOGRAM TRACING: CPT | Performed by: NURSE PRACTITIONER

## 2022-02-07 PROCEDURE — 71045 X-RAY EXAM CHEST 1 VIEW: CPT

## 2022-02-07 PROCEDURE — 83880 ASSAY OF NATRIURETIC PEPTIDE: CPT

## 2022-02-07 PROCEDURE — 6360000002 HC RX W HCPCS: Performed by: EMERGENCY MEDICINE

## 2022-02-07 PROCEDURE — 85025 COMPLETE CBC W/AUTO DIFF WBC: CPT

## 2022-02-07 PROCEDURE — 96376 TX/PRO/DX INJ SAME DRUG ADON: CPT

## 2022-02-07 PROCEDURE — 2140000000 HC CCU INTERMEDIATE R&B

## 2022-02-07 PROCEDURE — 73090 X-RAY EXAM OF FOREARM: CPT

## 2022-02-07 PROCEDURE — 99223 1ST HOSP IP/OBS HIGH 75: CPT | Performed by: PHYSICIAN ASSISTANT

## 2022-02-07 PROCEDURE — 80048 BASIC METABOLIC PNL TOTAL CA: CPT

## 2022-02-07 PROCEDURE — 2580000003 HC RX 258: Performed by: PHYSICIAN ASSISTANT

## 2022-02-07 PROCEDURE — 84443 ASSAY THYROID STIM HORMONE: CPT

## 2022-02-07 PROCEDURE — 84484 ASSAY OF TROPONIN QUANT: CPT

## 2022-02-07 PROCEDURE — 2500000003 HC RX 250 WO HCPCS: Performed by: NURSE PRACTITIONER

## 2022-02-07 PROCEDURE — 80076 HEPATIC FUNCTION PANEL: CPT

## 2022-02-07 PROCEDURE — 85610 PROTHROMBIN TIME: CPT

## 2022-02-07 PROCEDURE — 96374 THER/PROPH/DIAG INJ IV PUSH: CPT

## 2022-02-07 PROCEDURE — 2580000003 HC RX 258: Performed by: EMERGENCY MEDICINE

## 2022-02-07 PROCEDURE — 36415 COLL VENOUS BLD VENIPUNCTURE: CPT

## 2022-02-07 PROCEDURE — 73564 X-RAY EXAM KNEE 4 OR MORE: CPT

## 2022-02-07 PROCEDURE — 2500000003 HC RX 250 WO HCPCS: Performed by: EMERGENCY MEDICINE

## 2022-02-07 PROCEDURE — 96375 TX/PRO/DX INJ NEW DRUG ADDON: CPT

## 2022-02-07 PROCEDURE — 6360000002 HC RX W HCPCS: Performed by: PHYSICIAN ASSISTANT

## 2022-02-07 PROCEDURE — 93010 ELECTROCARDIOGRAM REPORT: CPT | Performed by: NUCLEAR MEDICINE

## 2022-02-07 RX ORDER — LORAZEPAM 2 MG/ML
2 INJECTION INTRAMUSCULAR
Status: DISCONTINUED | OUTPATIENT
Start: 2022-02-07 | End: 2022-02-08

## 2022-02-07 RX ORDER — NICOTINE 21 MG/24HR
1 PATCH, TRANSDERMAL 24 HOURS TRANSDERMAL EVERY 24 HOURS
Status: DISCONTINUED | OUTPATIENT
Start: 2022-02-07 | End: 2022-02-17 | Stop reason: HOSPADM

## 2022-02-07 RX ORDER — LORAZEPAM 2 MG/ML
4 INJECTION INTRAMUSCULAR
Status: DISCONTINUED | OUTPATIENT
Start: 2022-02-07 | End: 2022-02-08

## 2022-02-07 RX ORDER — ATORVASTATIN CALCIUM 80 MG/1
80 TABLET, FILM COATED ORAL NIGHTLY
Status: DISCONTINUED | OUTPATIENT
Start: 2022-02-07 | End: 2022-02-17 | Stop reason: HOSPADM

## 2022-02-07 RX ORDER — MULTIVITAMIN WITH IRON
1 TABLET ORAL DAILY
Status: DISCONTINUED | OUTPATIENT
Start: 2022-02-07 | End: 2022-02-17 | Stop reason: HOSPADM

## 2022-02-07 RX ORDER — SODIUM CHLORIDE 9 MG/ML
25 INJECTION, SOLUTION INTRAVENOUS PRN
Status: DISCONTINUED | OUTPATIENT
Start: 2022-02-07 | End: 2022-02-07 | Stop reason: SDUPTHER

## 2022-02-07 RX ORDER — FOLIC ACID 1 MG/1
1 TABLET ORAL DAILY
Status: DISCONTINUED | OUTPATIENT
Start: 2022-02-07 | End: 2022-02-08

## 2022-02-07 RX ORDER — ACETAMINOPHEN 325 MG/1
650 TABLET ORAL EVERY 6 HOURS PRN
Status: DISCONTINUED | OUTPATIENT
Start: 2022-02-07 | End: 2022-02-13

## 2022-02-07 RX ORDER — NICOTINE POLACRILEX 4 MG
15 LOZENGE BUCCAL PRN
Status: DISCONTINUED | OUTPATIENT
Start: 2022-02-07 | End: 2022-02-17 | Stop reason: HOSPADM

## 2022-02-07 RX ORDER — SODIUM CHLORIDE 0.9 % (FLUSH) 0.9 %
5-40 SYRINGE (ML) INJECTION PRN
Status: DISCONTINUED | OUTPATIENT
Start: 2022-02-07 | End: 2022-02-07 | Stop reason: SDUPTHER

## 2022-02-07 RX ORDER — DEXTROSE MONOHYDRATE 25 G/50ML
12.5 INJECTION, SOLUTION INTRAVENOUS PRN
Status: DISCONTINUED | OUTPATIENT
Start: 2022-02-07 | End: 2022-02-07 | Stop reason: SDUPTHER

## 2022-02-07 RX ORDER — LORAZEPAM 2 MG/1
2 TABLET ORAL
Status: DISCONTINUED | OUTPATIENT
Start: 2022-02-07 | End: 2022-02-08

## 2022-02-07 RX ORDER — NITROGLYCERIN 0.4 MG/1
0.4 TABLET SUBLINGUAL EVERY 5 MIN PRN
Status: DISCONTINUED | OUTPATIENT
Start: 2022-02-07 | End: 2022-02-17 | Stop reason: HOSPADM

## 2022-02-07 RX ORDER — WARFARIN SODIUM 7.5 MG/1
7.5 TABLET ORAL ONCE
Status: COMPLETED | OUTPATIENT
Start: 2022-02-07 | End: 2022-02-07

## 2022-02-07 RX ORDER — SODIUM CHLORIDE 0.9 % (FLUSH) 0.9 %
5-40 SYRINGE (ML) INJECTION EVERY 12 HOURS SCHEDULED
Status: DISCONTINUED | OUTPATIENT
Start: 2022-02-07 | End: 2022-02-17 | Stop reason: HOSPADM

## 2022-02-07 RX ORDER — FENTANYL CITRATE 50 UG/ML
50 INJECTION, SOLUTION INTRAMUSCULAR; INTRAVENOUS ONCE
Status: COMPLETED | OUTPATIENT
Start: 2022-02-07 | End: 2022-02-07

## 2022-02-07 RX ORDER — SODIUM CHLORIDE 0.9 % (FLUSH) 0.9 %
5-40 SYRINGE (ML) INJECTION EVERY 12 HOURS SCHEDULED
Status: DISCONTINUED | OUTPATIENT
Start: 2022-02-07 | End: 2022-02-07 | Stop reason: SDUPTHER

## 2022-02-07 RX ORDER — SODIUM CHLORIDE 9 MG/ML
25 INJECTION, SOLUTION INTRAVENOUS PRN
Status: DISCONTINUED | OUTPATIENT
Start: 2022-02-07 | End: 2022-02-17 | Stop reason: HOSPADM

## 2022-02-07 RX ORDER — BUMETANIDE 1 MG/1
2 TABLET ORAL DAILY
Status: DISCONTINUED | OUTPATIENT
Start: 2022-02-08 | End: 2022-02-08

## 2022-02-07 RX ORDER — SODIUM CHLORIDE 0.9 % (FLUSH) 0.9 %
5-40 SYRINGE (ML) INJECTION PRN
Status: DISCONTINUED | OUTPATIENT
Start: 2022-02-07 | End: 2022-02-17 | Stop reason: HOSPADM

## 2022-02-07 RX ORDER — POLYETHYLENE GLYCOL 3350 17 G/17G
17 POWDER, FOR SOLUTION ORAL DAILY PRN
Status: DISCONTINUED | OUTPATIENT
Start: 2022-02-07 | End: 2022-02-17 | Stop reason: HOSPADM

## 2022-02-07 RX ORDER — LORAZEPAM 2 MG/1
4 TABLET ORAL
Status: DISCONTINUED | OUTPATIENT
Start: 2022-02-07 | End: 2022-02-08

## 2022-02-07 RX ORDER — LORAZEPAM 1 MG/1
1 TABLET ORAL
Status: DISCONTINUED | OUTPATIENT
Start: 2022-02-07 | End: 2022-02-08

## 2022-02-07 RX ORDER — ONDANSETRON 4 MG/1
4 TABLET, ORALLY DISINTEGRATING ORAL EVERY 8 HOURS PRN
Status: DISCONTINUED | OUTPATIENT
Start: 2022-02-07 | End: 2022-02-17 | Stop reason: HOSPADM

## 2022-02-07 RX ORDER — DEXTROSE MONOHYDRATE 50 MG/ML
100 INJECTION, SOLUTION INTRAVENOUS PRN
Status: DISCONTINUED | OUTPATIENT
Start: 2022-02-07 | End: 2022-02-17 | Stop reason: HOSPADM

## 2022-02-07 RX ORDER — LANOLIN ALCOHOL/MO/W.PET/CERES
100 CREAM (GRAM) TOPICAL DAILY
Status: DISCONTINUED | OUTPATIENT
Start: 2022-02-07 | End: 2022-02-08

## 2022-02-07 RX ORDER — LOSARTAN POTASSIUM 50 MG/1
50 TABLET ORAL DAILY
Status: DISCONTINUED | OUTPATIENT
Start: 2022-02-07 | End: 2022-02-17 | Stop reason: HOSPADM

## 2022-02-07 RX ORDER — ACETAMINOPHEN 650 MG/1
650 SUPPOSITORY RECTAL EVERY 6 HOURS PRN
Status: DISCONTINUED | OUTPATIENT
Start: 2022-02-07 | End: 2022-02-13

## 2022-02-07 RX ORDER — HYDRALAZINE HYDROCHLORIDE 50 MG/1
100 TABLET, FILM COATED ORAL EVERY 8 HOURS SCHEDULED
Status: DISCONTINUED | OUTPATIENT
Start: 2022-02-07 | End: 2022-02-17 | Stop reason: HOSPADM

## 2022-02-07 RX ORDER — ALPRAZOLAM 0.5 MG/1
0.5 TABLET ORAL NIGHTLY PRN
Status: DISCONTINUED | OUTPATIENT
Start: 2022-02-07 | End: 2022-02-09

## 2022-02-07 RX ORDER — ISOSORBIDE MONONITRATE 60 MG/1
120 TABLET, EXTENDED RELEASE ORAL DAILY
Status: DISCONTINUED | OUTPATIENT
Start: 2022-02-08 | End: 2022-02-17 | Stop reason: HOSPADM

## 2022-02-07 RX ORDER — DILTIAZEM HYDROCHLORIDE 5 MG/ML
30 INJECTION INTRAVENOUS ONCE
Status: COMPLETED | OUTPATIENT
Start: 2022-02-07 | End: 2022-02-07

## 2022-02-07 RX ORDER — LORAZEPAM 2 MG/ML
INJECTION INTRAMUSCULAR
Status: COMPLETED
Start: 2022-02-07 | End: 2022-02-07

## 2022-02-07 RX ORDER — LORAZEPAM 2 MG/ML
1 INJECTION INTRAMUSCULAR
Status: DISCONTINUED | OUTPATIENT
Start: 2022-02-07 | End: 2022-02-08

## 2022-02-07 RX ORDER — ONDANSETRON 2 MG/ML
4 INJECTION INTRAMUSCULAR; INTRAVENOUS EVERY 6 HOURS PRN
Status: DISCONTINUED | OUTPATIENT
Start: 2022-02-07 | End: 2022-02-17 | Stop reason: HOSPADM

## 2022-02-07 RX ORDER — LORAZEPAM 2 MG/ML
3 INJECTION INTRAMUSCULAR
Status: DISCONTINUED | OUTPATIENT
Start: 2022-02-07 | End: 2022-02-08

## 2022-02-07 RX ORDER — INSULIN GLARGINE 100 [IU]/ML
20 INJECTION, SOLUTION SUBCUTANEOUS NIGHTLY
Status: DISCONTINUED | OUTPATIENT
Start: 2022-02-07 | End: 2022-02-17 | Stop reason: HOSPADM

## 2022-02-07 RX ORDER — METOPROLOL TARTRATE 50 MG/1
25 TABLET, FILM COATED ORAL 2 TIMES DAILY
Status: DISCONTINUED | OUTPATIENT
Start: 2022-02-07 | End: 2022-02-08

## 2022-02-07 RX ADMIN — LORAZEPAM 2 MG: 2 INJECTION INTRAMUSCULAR; INTRAVENOUS at 20:26

## 2022-02-07 RX ADMIN — LOSARTAN POTASSIUM 50 MG: 50 TABLET, FILM COATED ORAL at 20:31

## 2022-02-07 RX ADMIN — Medication 100 MG: at 20:32

## 2022-02-07 RX ADMIN — INSULIN LISPRO 3 UNITS: 100 INJECTION, SOLUTION INTRAVENOUS; SUBCUTANEOUS at 20:36

## 2022-02-07 RX ADMIN — ATORVASTATIN CALCIUM 80 MG: 80 TABLET, FILM COATED ORAL at 20:32

## 2022-02-07 RX ADMIN — HYDRALAZINE HYDROCHLORIDE 100 MG: 50 TABLET, FILM COATED ORAL at 20:31

## 2022-02-07 RX ADMIN — ALPRAZOLAM 0.5 MG: 0.5 TABLET ORAL at 23:08

## 2022-02-07 RX ADMIN — LORAZEPAM 3 MG: 2 INJECTION INTRAMUSCULAR; INTRAVENOUS at 21:49

## 2022-02-07 RX ADMIN — LORAZEPAM 2 MG: 2 INJECTION INTRAMUSCULAR; INTRAVENOUS at 22:46

## 2022-02-07 RX ADMIN — DILTIAZEM HYDROCHLORIDE 5 MG/HR: 5 INJECTION INTRAVENOUS at 15:40

## 2022-02-07 RX ADMIN — ONDANSETRON 4 MG: 2 INJECTION INTRAMUSCULAR; INTRAVENOUS at 22:10

## 2022-02-07 RX ADMIN — DILTIAZEM HYDROCHLORIDE 30 MG: 5 INJECTION INTRAVENOUS at 14:19

## 2022-02-07 RX ADMIN — SODIUM CHLORIDE, PRESERVATIVE FREE 10 ML: 5 INJECTION INTRAVENOUS at 20:32

## 2022-02-07 RX ADMIN — SODIUM CHLORIDE, PRESERVATIVE FREE 10 ML: 5 INJECTION INTRAVENOUS at 23:54

## 2022-02-07 RX ADMIN — LORAZEPAM 1 MG: 2 INJECTION INTRAMUSCULAR; INTRAVENOUS at 18:31

## 2022-02-07 RX ADMIN — FENTANYL CITRATE 50 MCG: 50 INJECTION, SOLUTION INTRAMUSCULAR; INTRAVENOUS at 14:56

## 2022-02-07 RX ADMIN — FOLIC ACID 1 MG: 1 TABLET ORAL at 20:32

## 2022-02-07 RX ADMIN — WARFARIN SODIUM 7.5 MG: 7.5 TABLET ORAL at 23:08

## 2022-02-07 RX ADMIN — METOPROLOL TARTRATE 25 MG: 50 TABLET, FILM COATED ORAL at 20:31

## 2022-02-07 RX ADMIN — LORAZEPAM 3 MG: 2 INJECTION INTRAMUSCULAR; INTRAVENOUS at 23:54

## 2022-02-07 RX ADMIN — INSULIN GLARGINE 20 UNITS: 100 INJECTION, SOLUTION SUBCUTANEOUS at 20:36

## 2022-02-07 RX ADMIN — TICAGRELOR 90 MG: 90 TABLET ORAL at 20:32

## 2022-02-07 RX ADMIN — Medication 1 TABLET: at 20:32

## 2022-02-07 ASSESSMENT — ENCOUNTER SYMPTOMS
SORE THROAT: 0
SHORTNESS OF BREATH: 0
EYE PAIN: 0
RHINORRHEA: 0
WHEEZING: 0
COUGH: 0
DIARRHEA: 0
ABDOMINAL PAIN: 0
NAUSEA: 0
EYE DISCHARGE: 0
ABDOMINAL DISTENTION: 0
VOMITING: 0

## 2022-02-07 ASSESSMENT — PAIN DESCRIPTION - PAIN TYPE
TYPE: ACUTE PAIN

## 2022-02-07 ASSESSMENT — PAIN DESCRIPTION - LOCATION
LOCATION: CHEST
LOCATION: RIB CAGE;KNEE

## 2022-02-07 ASSESSMENT — PAIN SCALES - GENERAL
PAINLEVEL_OUTOF10: 0
PAINLEVEL_OUTOF10: 8

## 2022-02-07 ASSESSMENT — PAIN DESCRIPTION - FREQUENCY: FREQUENCY: CONTINUOUS

## 2022-02-07 NOTE — H&P
Hospitalist - History & Physical      Patient: Keyur Rodriguez    Unit/Bed:01/001A  YOB: 1965  MRN: [id number]   Acct: [de-identified]   PCP: ISABELLA Pride CNP    Date of Service: Pt seen/examined on 02/07/22  and Admitted to Inpatient with expected LOS greater than two midnights due to medical therapy. Chief Complaint:  AICD firing / CP     Assessment and Plan:-  1. A. fib with RVR on OAC: Confirmed on EKG. Patient started on a Cardizem drip in ED, continue. Noted to be on Cordarone, and Mexitil at home, hold these for now. Resume Metoprolol. Appreciate cardiology input. Continue with warfarin, pharmacy to dose. Telemetry monitor. 2. Chest pain, likely secondary to AICD firing / Afib with RVR: Rule out ACS. Continue to trend troponin. Cardiology consulted. N.p.o. pending cardiology evaluation. EKG shows A. fib with RVR and nonspecific ST and T wave abnormality. Patient denies any current chest pain  3. Elevated troponin, chronic: Patient has chronic troponin elevation, however this value elevated compared to previous. Likely secondary to AICD firing. Will trend. EKG shows A. fib with RVR, nonspecific ST and T wave abnormality. Cardiologist has been consulted. 4. AG metabolic acidosis: repeat BMP and monitor s/p IVFs  5. Acute left knee pain: Patient underwent imaging in the ED which was negative for acute fracture. Apply heat. Tylenol. PT/OT. 6. S/p mechanical fall: Patient denies any loss of consciousness or striking his head. He slipped on ice. PT/OT. No acute fractures noted on imaging  7. Alcohol abuse: Concern for withdrawal. CIWA protocol, Ativan protocol. Daily folic acid, thiamine, MTV. Monitor. 8. Essential hypertension: Patient is on multiple agents, including Cardura, Norvasc, Imdur, Lopressor, hydralazine, Cozaar, and metoprolol. On Cardizem drip, will resume these in a stepwise fashion to avoid hypotension. Close monitoring.   9. CAD s/p CABG: Continue Brilinta, beta-blocker, statin, Imdur  10. CKD stage III: Creatinine noted at 2.1, this is improving previous value 1/22 at 2.7. Avoid nephrotoxic agents. 11. IDDM II, uncontrolled: Continue with home insulin. SSI. Accu. Hypoglycemia protocol in place. Check hemoglobin A1c, most recent value 8.1 in 11/21. 12. Anxiety: Resume patient's home Xanax  13. Disposition: Social work has been consulted. History Of Present Illness:    Patient is a 51-year-old male with past medical history of alcohol abuse, and acute cells CHF, anxiety, CAD, A. fib on [address], HLD, essential hypertension, and CKD who presents to [address] [place] [address] ED with chief complaint of chest pain secondary to his AICD firing. History obtained via patient and chart review. Patient is a poor historian. Patient reports that approximately 11:00 this morning he felt his AICD fire. Within the next hour, patient states that his device fired up to 5 times. Patient relates that he has not been taking his medications for the past 5 to 6 days as he has been drinking. Patient reports this is happened in the past, and relates it to his history of drinking. Patient states that he has been on a binge, drinking about 1/5 of apple crown whiskey for the past 5 to 6 days. Patient reports that on Saturday he fell on ice which led to left knee pain. Patient is on Coumadin, he denies striking his head or any loss of consciousness. Patient denies any other symptoms, including fever/chills, nausea/vomiting/diarrhea. He reports chest pain this morning when he felt his AICD fire, however this has improved. Patient denies shortness of breath. Patient reports left knee pain. Upon exam, patient is agitated and fidgeting. He reports that he has severe anxiety and does not want to be admitted. Discussed with patient that if he did not want admission that he would have to sign out AMA given his condition.   Patient will be admitted to stepdown with cardiology consult. 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    Anxiety disorder     Arthritis     Atrial fibrillation ([address]     CAD (coronary artery disease) 3/25/2014    s/p CABG in may 2014    Cardiomyopathy ([address]     Chronic kidney disease     Depression     Diabetes mellitus ([address]     Drug abuse ([address]     hx of cacaine abuse, stopped abusing drugs in 2012    GERD (gastroesophageal reflux disease)     H/O cardiac catheterization 4/30/2014    Hyperlipidemia     Hypertension     Intradural extramedullary spinal tumor     Lumbar spine tumor     Medtronic dual icd      Neuromuscular disorder ([address]     Other disorders of kidney and ureter in diseases classified elsewhere     Paroxysmal atrial fibrillation ([address] 7/22/2014    V t[facility] [address] [facility]     V-tach [facility])     s/p ablation in dec 2014       Past Surgical History:        Procedure Laterality Date    CARD[facility]AC [facility]HETERIZ[facility]  3/20/14     [facility]    CARDIAC DEFIBRILLATOR PLACEMENT  10/13/2015    [facility], MRI CONDITIONAL ICD    CORONARY ARTERY BYPASS GRAFT  5-9-14    3 bypass    EKG 12-LEAD  7/17/2015         OTHER SURGICAL HISTORY Left 10/21/14    Left Bursectomy, I & D Left Elbow - Dr. Marybeth Kelly LAMINECTOMY,>2 [facility] N/A 1/16/2018    LUMBAR AND SACRAL LAMINECTOMY, REMOVAL OF INTRASPINAL TUMORS performed by Siomara Singh MD at [id number] harvest from legs       Home Medications:   No current facility-administered medications on file prior to encounter. Current Outpatient Medications on File Prior to Encounter   Medication Sig Dispense Refill    ONETOUCH ULTRA strip USE AS DIRECTED TWICE DAILY 150 strip 3    ONE TOUCH ULTRASOFT LANCETS MISC USE AS DIRECTED TWICE DAILY.  200 each 3    doxazosin (CARDURA) 2 MG tablet Take 1 tablet by mouth daily 90 tablet 3    Blood Glucose Monitoring Suppl (ONE TOUCH ULTRA 2) w/Device KIT 1 kit by Does not apply route 2 times daily 1 kit 0    insulin glargine (LANTUS SOLOSTAR) 100 UNIT/ML injection pen Inject 20 Units into the skin nightly Increase 2 units until morning sugar > 150 5 pen 3    Insulin Pen Needle 31G X 8 MM MISC 1 each by Does not apply route daily 100 each 3    linagliptin (TRADJENTA) 5 MG tablet TAKE 1 TABLET BY MOUTH DAILY 90 tablet 3    potassium chloride (KLOR-CON M) 20 MEQ extended release tablet Take 1 tablet by mouth daily 90 tablet 3    ALPRAZolam (XANAX) 0.5 MG tablet TAKE 1 TABLET BY MOUTH AT BEDTIME FOR ANXIETY 30 tablet 5    atorvastatin (LIPITOR) 80 MG tablet TAKE 1 TABLET BY MOUTH EVERY NIGHT 30 tablet 5    metoprolol tartrate (LOPRESSOR) 25 MG tablet Take 1 tablet by mouth 2 times daily 180 tablet 2    amLODIPine (NORVASC) 5 MG tablet Take 1 tablet by mouth 2 times daily 180 tablet 1    amiodarone (CORDARONE) 200 MG tablet Take 1 tablet by mouth daily 90 tablet 3    hydrALAZINE (APRESOLINE) 100 MG tablet TAKE 1 TABLET BY MOUTH THREE TIMES DAILY 270 tablet 3    losartan (COZAAR) 50 MG tablet TAKE 1 TABLET BY MOUTH DAILY 30 tablet 0    ticagrelor (BRILINTA) 90 MG TABS tablet Take 1 tablet by mouth 2 times daily 180 tablet 3    isosorbide mononitrate (IMDUR) 120 MG extended release tablet TAKE 1 TABLET BY MOUTH DAILY 90 tablet 3    bumetanide (BUMEX) 2 MG tablet TAKE 1 TABLET BY MOUTH DAILY 90 tablet 3    warfarin (COUMADIN) 5 MG tablet USE AS DIRECTED BY COUMADIN CLINIC 180 tablet 5    mexiletine (MEXITIL) 150 MG capsule TAKE 2 CAPSULES THREE TIMES DAILY FOR ATRIAL FIBRILLATION 540 capsule 3    nitroGLYCERIN (NITROSTAT) 0.4 MG SL tablet Place 1 tablet under the tongue every 5 minutes as needed for Chest pain X 3 doses.  If chest pain continues seek medical attention 25 tablet 3    glucose blood VI test strips (PHIL CONTOUR TEST) strip 1 each by In Vitro route daily 300 each 3    acetaminophen 650 MG TABS Take 650 mg by mouth every 4 hours as needed 120 tablet 3       Allergies:    Latex, Pcn [penicillins], and Zoloft [sertraline hcl]    Social History:    reports that he quit smoking about 15 months ago. His smoking use included cigarettes. He started smoking about 41 years ago. He has a 32.00 pack-year smoking history. He has quit using smokeless tobacco. He reports current alcohol use. He reports that he does not use drugs. Family History:       Problem Relation Age of Onset    Diabetes Mother     High Blood Pressure Mother     Stroke Mother     Diabetes Father     Heart Disease Father     High Blood Pressure Father     Heart Disease Sister         CABG    Diabetes Maternal Grandmother     Diabetes Maternal Grandfather     Heart Disease Paternal Grandfather        Diet:  No diet orders on file    Review of systems:   Pertinent positives as noted in the HPI. All other systems reviewed and negative. PHYSICAL EXAM:  BP (!) 152/90   Pulse 110   Temp 97.8 °F (36.6 °C) (Oral)   Resp 16   Ht 6' 2\" (1.88 m)   Wt 280 lb (127 kg)   SpO2 96%   BMI 35.95 kg/m²   General appearance: Obese, unkempt, agitated, no apparent distress, appears stated age and cooperative. HEENT: Normal cephalic, atraumatic without obvious deformity. Pupils equal, round, and reactive to light. Extra ocular muscles intact. Conjunctivae/corneas clear. Neck: Supple, with full range of motion. No jugular venous distention. Trachea midline. Respiratory:  Normal respiratory effort. Diminished to auscultation anteriorly, bilaterally without Rales/Wheezes/Rhonchi. Cardiovascular: IR IR with normal S1/S2 without murmurs, rubs or gallops. Abdomen: Soft, non-tender, non-distended with normal bowel sounds. Musculoskeletal:  No clubbing, cyanosis or edema bilaterally. Left knee TTP on medial aspect.   No swelling, ecchymosis appreciated  Skin: Skin color, texture, turgor normal.  No rashes or lesions. Neurologic:  Neurovascularly intact without any focal sensory/motor deficits. Cranial nerves: II-XII intact, grossly non-focal.  Psychiatric: Alert and oriented x4, thought content appropriate, normal insight  Capillary Refill: Brisk,< 3 seconds   Peripheral Pulses: +2 palpable, equal bilaterally     Labs:   Recent Labs     02/07/22  1313   WBC 8.2   HGB 12.2*   HCT 36.6*        Recent Labs     02/07/22  1313      K 4.4      CO2 15*   BUN 31*   CREATININE 2.1*   CALCIUM 7.2*     Recent Labs     02/07/22  1313   AST 28   ALT 22   BILIDIR <0.2   BILITOT 0.5   ALKPHOS 101     No results for input(s): INR in the last 72 hours. No results for input(s): Brown Sol in the last 72 hours. Urinalysis:    Lab Results   Component Value Date    NITRU NEGATIVE 01/20/2022    WBCUA 0-2 01/20/2022    BACTERIA NONE SEEN 01/20/2022    RBCUA 0-2 01/20/2022    BLOODU NEGATIVE 01/20/2022    SPECGRAV 1.018 01/20/2022    GLUCOSEU NEGATIVE 09/24/2019       Radiology:   XR HUMERUS RIGHT (MIN 2 VIEWS)   Final Result   No acute process            **This report has been created using voice recognition software. It may contain minor errors which are inherent in voice recognition technology. **      Final report electronically signed by Dr. Sharmin Rodriguez on 2/7/2022 2:25 PM      XR RADIUS ULNA RIGHT (2 VIEWS)   Final Result   No acute process            **This report has been created using voice recognition software. It may contain minor errors which are inherent in voice recognition technology. **      Final report electronically signed by Dr. Sharmin Rodriguez on 2/7/2022 2:34 PM      XR KNEE LEFT (MIN 4 VIEWS)   Final Result   No acute abnormality            **This report has been created using voice recognition software. It may contain minor errors which are inherent in voice recognition technology. **      Final report electronically signed by Dr. Sharmin Rodriguez on 2/7/2022 2:31 PM      XR CHEST LATERAL   Final Result   No acute process            **This report has been created using voice recognition software. It may contain minor errors which are inherent in voice recognition technology. **      Final report electronically signed by Dr. Emelina Marcano on 2/7/2022 2:33 PM      XR CHEST PORTABLE   Final Result   No acute intrathoracic process. **This report has been created using voice recognition software. It may contain minor errors which are inherent in voice recognition technology. **      Final report electronically signed by Dr Mary Jane Ridley on 2/7/2022 1:35 PM        XR HUMERUS RIGHT (MIN 2 VIEWS)    Result Date: 2/7/2022  PROCEDURE: XR HUMERUS RIGHT (MIN 2 VIEWS) CLINICAL INFORMATION: fall, pain . TECHNIQUE: AP and lateral projections COMPARISON: No prior study. FINDINGS: No acute fracture or dislocation. Small ossific density adjacent to the lateral humeral head likely calcific tendinitis or bursitis. No soft tissue abnormality identified. IV line At the antecubital space. No acute process **This report has been created using voice recognition software. It may contain minor errors which are inherent in voice recognition technology. ** Final report electronically signed by Dr. Emelina Marcano on 2/7/2022 2:25 PM    XR RADIUS ULNA RIGHT (2 VIEWS)    Result Date: 2/7/2022  PROCEDURE: XR RADIUS ULNA RIGHT (2 VIEWS) CLINICAL INFORMATION: fall . TECHNIQUE: AP and lateral COMPARISON: No prior study. FINDINGS: There is no fracture dislocation artifacts from patient's IV at the antecubital space overlapping the upper forearm. Vascular calcifications are noted. No other soft tissue abnormality is seen. No acute process **This report has been created using voice recognition software. It may contain minor errors which are inherent in voice recognition technology. ** Final report electronically signed by Dr. Emelina Marcano on 2/7/2022 2:34 PM    XR KNEE LEFT (MIN 4 VIEWS)    Result Date: 2/7/2022  PROCEDURE: XR KNEE LEFT (MIN 4 VIEWS) CLINICAL INFORMATION: patient fell . TECHNIQUE: 4 projections COMPARISON: No prior study. FINDINGS: No fracture or dislocation. Clothing artifacts. Bipartite patella. No acute fracture or dislocation. No soft tissue abnormality. Surgical clips are noted in the medial posterior soft tissues. No acute abnormality **This report has been created using voice recognition software. It may contain minor errors which are inherent in voice recognition technology. ** Final report electronically signed by Dr. Hussein Rodas on 2/7/2022 2:31 PM    XR CHEST LATERAL    Result Date: 2/7/2022  PROCEDURE: XR CHEST LATERAL CLINICAL INFORMATION: fall, aicd firing . TECHNIQUE: Lateral projections of the chest COMPARISON: 9/24/2019 FINDINGS: The pacer leads appear unchanged from prior exam no infiltrates or effusions. Bone detail is limited no gross acute abnormality is seen. No acute process **This report has been created using voice recognition software. It may contain minor errors which are inherent in voice recognition technology. ** Final report electronically signed by Dr. Hussein Rodas on 2/7/2022 2:33 PM    XR CHEST PORTABLE    Result Date: 2/7/2022  PROCEDURE: XR CHEST PORTABLE CLINICAL INFORMATION: Chest pain. COMPARISON: Chest x-ray 10/19/2020. TECHNIQUE: AP portable chest radiograph performed. FINDINGS: Lines/tubes: A left chest permanent pacemaker is stable. Heart/mediastinum: Cardiomegaly and median sternotomy wires are unchanged. The pulmonary vascularity is unremarkable. Lungs: Low lung volumes are present. No focal consolidation, pleural effusion, or pneumothorax is observed. Bones: The visualized skeletal structures appear intact. No acute intrathoracic process. **This report has been created using voice recognition software. It may contain minor errors which are inherent in voice recognition technology. ** Final report electronically signed by Dr Jw Bhardwaj on 2/7/2022 1:35 PM        EKG: Afib with RVR with PVCs / abberantly conducted complexes, nonspecific ST and T wave abnormality     Electronically signed by Joe Sanchez PA-C on 2/7/2022 at 4:47 PM

## 2022-02-07 NOTE — ED NOTES
Pt states he has a lot of stress. Pt states he was laid off work for the week. Pt denies wanting to talk to anyone about his drinking. Pt states he went to rehab 10 years ago and he knows what to do.       Yuliya Lua RN  02/07/22 1500

## 2022-02-07 NOTE — PROGRESS NOTES
Patient arrived to 3B26 from ED via cart. Stand by assist to bed. Placed on TELE, Afib w/RVR. -160's. Cardizem gtt at 5mg/hr. Increasing to 10mg/hr. Patient denies any pain at the present. Database started.

## 2022-02-07 NOTE — ED PROVIDER NOTES
[place] ENCOUNTER          Pt Name: Gena Cleaning  MRN: [id number]  [place] 1965  Date of evaluation: 2/7/2022  Treating Resident Physician: Jerald Thomas MD  Supervising Physician: Grecia Baeza       Chief Complaint   Patient presents with    AICD Problem     History obtained from the patient. HISTORY OF PRESENT ILLNESS    HPI  Gena Cleaning is a 64 y.o. male who presents to the emergency department for evaluation of AICD problem. Patient stated that he has been drinking for the last 6 days and then had an episode approximately 1 hour prior to arrival where his defibrillator went off at least 5 times. Patient states that he also had an episode where he slipped in the snow and ice approximately 2 days ago because bruising to his right ribs and left knee. There are no alleviating factors. Patient states that when he is drank heavily in the past he has had episodes where his defibrillator had been off as well. .  Patient denies any chest pain syncope or shortness of breath. The patient has no other acute complaints at this time. REVIEW OF SYSTEMS   Review of Systems   Constitutional: Negative for fatigue and fever. HENT: Negative for congestion, ear pain, rhinorrhea and sore throat. Eyes: Negative for pain and discharge. Respiratory: Negative for cough, shortness of breath and wheezing. Cardiovascular: Negative for chest pain, palpitations and leg swelling. Patient states that his defibrillator fired prior to arrival x5   Gastrointestinal: Negative for abdominal distention, abdominal pain, diarrhea, nausea and vomiting. Genitourinary: Negative for difficulty urinating, flank pain and frequency. Musculoskeletal: Negative for arthralgias. Neurological: Negative for dizziness, tremors, syncope, weakness and numbness.          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    Anxiety disorder     Arthritis     Atrial fibrillation ([address]     CAD (coronary artery disease) 3/25/2014    s/p CABG in may 2014    Cardiomyopathy ([address]     Chronic kidney disease     Depression     Diabetes mellitus ([address]     Drug abuse ([address]     hx of cacaine abuse, stopped abusing drugs in 2012    GERD (gastroesophageal reflux disease)     H/O cardiac catheterization 4/30/2014    Hyperlipidemia     Hypertension     Intradural extramedullary spinal tumor     Lumbar spine tumor     Medtronic dual icd      Neuromuscular disorder ([address]     Other disorders of kidney and ureter in diseases classified elsewhere     Paroxysmal atrial fibrillation ([address] 7/22/2014    V tach ([address]     V-tach ([address]     s/p ablation in dec 2014     Past Surgical History:   Procedure Laterality Date    CARDIAC CATHETERIZATION  3/20/14     [facility]    CARDIAC DEFIBRILLATOR PLACEMENT  10/13/2015    [employer] [place], MRI CONDITIONAL ICD    CORONARY ARTERY BYPASS GRAFT  5-9-14    3 bypass    EKG 12-LEAD  7/17/2015         OTHER SURGICAL HISTORY Left 10/21/14    Left Bursectomy, I & D Left Elbow - Dr. Swanson Mantle LAMINECTOMY,>2 [facility] N/A 1/16/2018    LUMBAR AND SACRAL LAMINECTOMY, REMOVAL OF INTRASPINAL TUMORS performed by Shyann Medina MD at [id number] harvest from legs         MEDICATIONS     Current Facility-Administered Medications:     dilTIAZem 125 mg in dextrose 5 % 125 mL infusion, 5-15 mg/hr, IntraVENous, Continuous, Tomer Michel MD    Current Outpatient Medications:     ONETOUCH ULTRA strip, USE AS DIRECTED TWICE DAILY, Disp: 150 strip, Rfl: 3    ONE TOUCH ULTRASOFT LANCETS MISC, USE AS DIRECTED TWICE DAILY. , Disp: 200 each, Rfl: 3    doxazosin (CARDURA) 2 MG tablet, Take 1 tablet by mouth daily, Disp: 90 tablet, Rfl: 3    Blood Glucose Monitoring Suppl (ONE TOUCH ULTRA 2) w/Device KIT, 1 kit by Does not apply route 2 times daily, Disp: 1 kit, Rfl: 0    insulin glargine (LANTUS SOLOSTAR) 100 UNIT/ML injection pen, Inject 20 Units into the skin nightly Increase 2 units until morning sugar > 150, Disp: 5 pen, Rfl: 3    Insulin Pen Needle 31G X 8 MM MISC, 1 each by Does not apply route daily, Disp: 100 each, Rfl: 3    linagliptin (TRADJENTA) 5 MG tablet, TAKE 1 TABLET BY MOUTH DAILY, Disp: 90 tablet, Rfl: 3    potassium chloride (KLOR-CON M) 20 MEQ extended release tablet, Take 1 tablet by mouth daily, Disp: 90 tablet, Rfl: 3    ALPRAZolam (XANAX) 0.5 MG tablet, TAKE 1 TABLET BY MOUTH AT BEDTIME FOR ANXIETY, Disp: 30 tablet, Rfl: 5    atorvastatin (LIPITOR) 80 MG tablet, TAKE 1 TABLET BY MOUTH EVERY NIGHT, Disp: 30 tablet, Rfl: 5    metoprolol tartrate (LOPRESSOR) 25 MG tablet, Take 1 tablet by mouth 2 times daily, Disp: 180 tablet, Rfl: 2    amLODIPine (NORVASC) 5 MG tablet, Take 1 tablet by mouth 2 times daily, Disp: 180 tablet, Rfl: 1    amiodarone (CORDARONE) 200 MG tablet, Take 1 tablet by mouth daily, Disp: 90 tablet, Rfl: 3    hydrALAZINE (APRESOLINE) 100 MG tablet, TAKE 1 TABLET BY MOUTH THREE TIMES DAILY, Disp: 270 tablet, Rfl: 3    losartan (COZAAR) 50 MG tablet, TAKE 1 TABLET BY MOUTH DAILY, Disp: 30 tablet, Rfl: 0    ticagrelor (BRILINTA) 90 MG TABS tablet, Take 1 tablet by mouth 2 times daily, Disp: 180 tablet, Rfl: 3    isosorbide mononitrate (IMDUR) 120 MG extended release tablet, TAKE 1 TABLET BY MOUTH DAILY, Disp: 90 tablet, Rfl: 3    bumetanide (BUMEX) 2 MG tablet, TAKE 1 TABLET BY MOUTH DAILY, Disp: 90 tablet, Rfl: 3    warfarin (COUMADIN) 5 MG tablet, USE AS DIRECTED BY COUMADIN CLINIC, Disp: 180 tablet, Rfl: 5    mexiletine (MEXITIL) 150 MG capsule, TAKE 2 CAPSULES THREE TIMES DAILY FOR ATRIAL FIBRILLATION, Disp: 540 capsule, Rfl: 3   nitroGLYCERIN (NITROSTAT) 0.4 MG SL tablet, Place 1 tablet under the tongue every 5 minutes as needed for Chest pain X 3 doses. If chest pain continues seek medical attention, Disp: 25 tablet, Rfl: 3    glucose blood VI test strips (PHIL CONTOUR TEST) strip, 1 each by In Vitro route daily, Disp: 300 each, Rfl: 3    acetaminophen 650 MG TABS, Take 650 mg by mouth every 4 hours as needed, Disp: 120 tablet, Rfl: 3      SOCIAL HISTORY     Social History     Social History Narrative    Not on file     Social History     Tobacco Use    Smoking status: Former Smoker     Packs/day: 1.00     Years: 32.00     Pack years: 32.00     Types: Cigarettes     Start date: 1980     Quit date: 10/19/2020     Years since quittin.3    Smokeless tobacco: Former User   Vaping Use    Vaping Use: Never used   Substance Use Topics    Alcohol use: Yes     Alcohol/week: 0.0 standard drinks     Comment: occasional    Drug use: No         ALLERGIES     Allergies   Allergen Reactions    Latex     Pcn [Penicillins] Hives    Zoloft [Sertraline Hcl] Anxiety     Worsened anxiety         FAMILY HISTORY     Family History   Problem Relation Age of Onset    Diabetes Mother     High Blood Pressure Mother     Stroke Mother     Diabetes Father     Heart Disease Father     High Blood Pressure Father     Heart Disease Sister         CABG    Diabetes Maternal Grandmother     Diabetes Maternal Grandfather     Heart Disease Paternal Grandfather          PREVIOUS RECORDS   Previous records reviewed: today  PHYSICAL EXAM     ED Triage Vitals   BP Temp Temp Source Pulse Resp SpO2 Height Weight   22 1245 22 1253 22 1253 22 1245 22 1245 22 1245 22 1253 22 1253   (!) 144/96 97.8 °F (36.6 °C) Oral 136 18 97 % 6' 2\" (1.88 m) 280 lb (127 kg)     Initial vital signs and nursing assessment reviewed and normal. Body mass index is 35.95 kg/m².  Pulsoximetry is normal per my interpretation. Additional Vital Signs:  Vitals:    02/07/22 1458   BP: (!) 150/104   Pulse: 118   Resp: 18   Temp:    SpO2: 94%       Physical Exam  Constitutional:       Appearance: Normal appearance. HENT:      Head: Normocephalic. Right Ear: External ear normal.      Left Ear: External ear normal.      Nose: Nose normal.      Mouth/Throat:      Mouth: Mucous membranes are moist.      Pharynx: Oropharynx is clear. Eyes:      Conjunctiva/sclera: Conjunctivae normal.      Pupils: Pupils are equal, round, and reactive to light. Cardiovascular:      Rate and Rhythm: Normal rate and regular rhythm. Pulses: Normal pulses. Heart sounds: Normal heart sounds. Pulmonary:      Effort: Pulmonary effort is normal.      Breath sounds: Normal breath sounds. Abdominal:      General: Bowel sounds are normal.      Palpations: Abdomen is soft. Musculoskeletal:         General: Normal range of motion. Cervical back: Normal range of motion and neck supple. Skin:     General: Skin is warm and dry. Capillary Refill: Capillary refill takes less than 2 seconds. Neurological:      General: No focal deficit present. Mental Status: He is alert. Psychiatric:      Comments: Patient appears clinically intoxicated. MEDICAL DECISION MAKING   Initial Assessment:   Patient is a 55-year-old male who presents today with complaint of defibrillator firing x5 prior to arrival.  Patient also states that he had a nonsyncopal fall approximately 2 days ago causing bruising to right ribs and left knee. Patient torrential diagnosis includes but is not limited to A. fib with RVR, SVT,Defibillator malfunction, congestive heart failure, alcohol abuse, electrolyte abnormality. Plan:   Labs  EKG  Cardizem bolus followed by Cardizem drip if not improved.   Cardiac monitoring      ED RESULTS   Laboratory results:  Labs Reviewed   CBC WITH AUTO DIFFERENTIAL - Abnormal; Notable for the following components:       Result Value    RBC 3.66 (*)     Hemoglobin 12.2 (*)     Hematocrit 36.6 (*)     .0 (*)     MCH 33.3 (*)     RDW-SD 50.4 (*)     All other components within normal limits   BASIC METABOLIC PANEL W/ REFLEX TO MG FOR LOW K - Abnormal; Notable for the following components:    CO2 15 (*)     Glucose 428 (*)     BUN 31 (*)     CREATININE 2.1 (*)     Calcium 7.2 (*)     All other components within normal limits   HEPATIC FUNCTION PANEL - Abnormal; Notable for the following components:    Albumin 3.4 (*)     Total Protein 5.7 (*)     All other components within normal limits   TROPONIN - Abnormal; Notable for the following components:    Troponin T 0.175 (*)     All other components within normal limits   BRAIN NATRIURETIC PEPTIDE - Abnormal; Notable for the following components:    Pro-BNP 6494.0 (*)     All other components within normal limits   ANION GAP - Abnormal; Notable for the following components:    Anion Gap 17.0 (*)     All other components within normal limits   GLOMERULAR FILTRATION RATE, ESTIMATED - Abnormal; Notable for the following components:    Est, Glom Filt Rate 33 (*)     All other components within normal limits   OSMOLALITY - Abnormal; Notable for the following components:    Osmolality Calc 306.1 (*)     All other components within normal limits       Radiologic studies results:  XR HUMERUS RIGHT (MIN 2 VIEWS)   Final Result   No acute process            **This report has been created using voice recognition software. It may contain minor errors which are inherent in voice recognition technology. **      Final report electronically signed by Dr. Eleanora Apgar on 2022 2:25 PM      XR RADIUS ULNA RIGHT (2 VIEWS)   Final Result   No acute process            **This report has been created using voice recognition software. It may contain minor errors which are inherent in voice recognition technology. **      Final report electronically signed by Dr. Eleanora Apgar on 2022 2:34 PM      XR KNEE LEFT (MIN 4 VIEWS)   Final Result   No acute abnormality            **This report has been created using voice recognition software. It may contain minor errors which are inherent in voice recognition technology. **      Final report electronically signed by Dr. Dyan Aguirre on 2/7/2022 2:31 PM      XR CHEST LATERAL   Final Result   No acute process            **This report has been created using voice recognition software. It may contain minor errors which are inherent in voice recognition technology. **      Final report electronically signed by Dr. Dyan Aguirre on 2/7/2022 2:33 PM      XR CHEST PORTABLE   Final Result   No acute intrathoracic process. **This report has been created using voice recognition software. It may contain minor errors which are inherent in voice recognition technology. **      Final report electronically signed by Dr Jorge Luis Whitlock on 2/7/2022 1:35 PM          ED Medications administered this visit:   Medications   dilTIAZem 125 mg in dextrose 5 % 125 mL infusion (has no administration in time range)   dilTIAZem injection 30 mg (30 mg IntraVENous Given 2/7/22 1419)   fentaNYL (SUBLIMAZE) injection 50 mcg (50 mcg IntraVENous Given 2/7/22 1456)         ED COURSE      Patient is a 80-year-old male with complaint of AICD firing prior to arrival x5. On exam in the ED patient appears clinically intoxicated. Patient did have a heart rate in the 140s on arrival requiring both Cardizem bolus and then later Cardizem drip. Consulted Dr. Magdalene Jacobson. Patient will be admitted to hospitalist for further evaluation and treatment.   Patient this time will be diagnosed with A. fib with RVR, alcohol abuse, and AICD discharge  MEDICATION CHANGES     New Prescriptions    No medications on file         FINAL DISPOSITION     Final diagnoses:   AICD discharge   Atrial fibrillation with RVR ([address]   Alcohol abuse     Condition: condition: good  Dispo: Discharge to home      This transcription was electronically signed. Parts of this transcriptions may have been dictated by use of voice recognition software and electronically transcribed, and parts may have been transcribed with the assistance of an ED scribe. The transcription may contain errors not detected in proofreading. Please refer to my supervising physician's documentation if my documentation differs.     Electronically Signed: Adryan Castellon MD, 02/07/22, 3:32 PM       Adryan Castellon MD  Resident  02/07/22 [id number]

## 2022-02-07 NOTE — ED NOTES
ED to inpatient nurses report    Chief Complaint   Patient presents with    AICD Problem      Present to ED from home  LOC: alert and orientated to name, place, date  Vital signs   Vitals:    02/07/22 1458 02/07/22 1540 02/07/22 1640 02/07/22 1725   BP: (!) 150/104 (!) 164/91 (!) 152/90 (!) 151/103   Pulse: 118 112 110 111   Resp: 18 18 16 21   Temp:       TempSrc:       SpO2: 94% 99% 96% 95%   Weight:       Height:          Oxygen Baseline 0L    Current needs required 0L   LDAs:   Peripheral IV 02/07/22 Right Antecubital (Active)   Site Assessment Clean;Dry; Intact 02/07/22 1541   Line Status Infusing 02/07/22 1541   Dressing Status Clean;Dry; Intact 02/07/22 1541   Dressing Intervention New 02/07/22 1325     Mobility: Independent  Pending ED orders: None  Present condition: Stable      Electronically signed by Terrence Delgado RN on 2/7/2022 at 5:35 PM       Terrence Delgado RN  02/07/22 9708

## 2022-02-07 NOTE — ED NOTES
Bed: 001A  Expected date:   Expected time:   Means of arrival:   Comments:  AGUSTÍN Granger RN  02/07/22 1256

## 2022-02-07 NOTE — ED NOTES
Report received from [facility], [facility]. Pt resting in bed and updated on plan of care. Respirations unlabored on room air. Call light within reach.       Lilian Allen RN  02/07/22 8685

## 2022-02-07 NOTE — ED TRIAGE NOTES
Pt presents to the ED via EMS from home due to his defibrillator going off 5 times since 1200 today. Pt denies any complaints prior to it going off. Pt states he has been drinking 1/5 of crown for the last 6 days. Pt states when he drinks a lot his defibrillator goes off. Pt now complaining of pain at the site.

## 2022-02-07 NOTE — ED NOTES
Pt irritable. Pt yelling that he is in pain. Pt given medication per orders. Pt updated on plan of care.  Pt sitting on edge of bed     [facility]  02/07/22 [id number]

## 2022-02-07 NOTE — TELEPHONE ENCOUNTER
[facility] received on patient. Came to ER for 6 shocks from ICD. Stated has been drinking 1/5 of crown for the last 6 days.

## 2022-02-08 LAB
ALBUMIN SERPL-MCNC: 3.2 G/DL (ref 3.5–5.1)
ALP BLD-CCNC: 99 U/L (ref 38–126)
ALT SERPL-CCNC: 20 U/L (ref 11–66)
ANION GAP SERPL CALCULATED.3IONS-SCNC: 13 MEQ/L (ref 8–16)
AST SERPL-CCNC: 30 U/L (ref 5–40)
BASOPHILS # BLD: 0.6 %
BASOPHILS ABSOLUTE: 0.1 THOU/MM3 (ref 0–0.1)
BILIRUB SERPL-MCNC: 0.9 MG/DL (ref 0.3–1.2)
BUN BLDV-MCNC: 30 MG/DL (ref 7–22)
CALCIUM SERPL-MCNC: 7.8 MG/DL (ref 8.5–10.5)
CHLORIDE BLD-SCNC: 109 MEQ/L (ref 98–111)
CO2: 20 MEQ/L (ref 23–33)
CREAT SERPL-MCNC: 2 MG/DL (ref 0.4–1.2)
EOSINOPHIL # BLD: 3.1 %
EOSINOPHILS ABSOLUTE: 0.3 THOU/MM3 (ref 0–0.4)
ERYTHROCYTE [DISTWIDTH] IN BLOOD BY AUTOMATED COUNT: 13.9 % (ref 11.5–14.5)
ERYTHROCYTE [DISTWIDTH] IN BLOOD BY AUTOMATED COUNT: 50.8 FL (ref 35–45)
GFR SERPL CREATININE-BSD FRML MDRD: 35 ML/MIN/1.73M2
GLUCOSE BLD-MCNC: 127 MG/DL (ref 70–108)
GLUCOSE BLD-MCNC: 166 MG/DL (ref 70–108)
GLUCOSE BLD-MCNC: 168 MG/DL (ref 70–108)
GLUCOSE BLD-MCNC: 178 MG/DL (ref 70–108)
GLUCOSE BLD-MCNC: 184 MG/DL (ref 70–108)
HCT VFR BLD CALC: 38 % (ref 42–52)
HEMOGLOBIN: 12.5 GM/DL (ref 14–18)
IMMATURE GRANS (ABS): 0.03 THOU/MM3 (ref 0–0.07)
IMMATURE GRANULOCYTES: 0.3 %
INR BLD: 1.02 (ref 0.85–1.13)
LV EF: 28 %
LVEF MODALITY: NORMAL
LYMPHOCYTES # BLD: 12.5 %
LYMPHOCYTES ABSOLUTE: 1.4 THOU/MM3 (ref 1–4.8)
MCH RBC QN AUTO: 33.2 PG (ref 26–33)
MCHC RBC AUTO-ENTMCNC: 32.9 GM/DL (ref 32.2–35.5)
MCV RBC AUTO: 101.1 FL (ref 80–94)
MONOCYTES # BLD: 7.2 %
MONOCYTES ABSOLUTE: 0.8 THOU/MM3 (ref 0.4–1.3)
MRSA SCREEN RT-PCR: POSITIVE
NUCLEATED RED BLOOD CELLS: 0 /100 WBC
PLATELET # BLD: 120 THOU/MM3 (ref 130–400)
PMV BLD AUTO: 10.1 FL (ref 9.4–12.4)
POTASSIUM REFLEX MAGNESIUM: 3.8 MEQ/L (ref 3.5–5.2)
RBC # BLD: 3.76 MILL/MM3 (ref 4.7–6.1)
SEG NEUTROPHILS: 76.3 %
SEGMENTED NEUTROPHILS ABSOLUTE COUNT: 8.5 THOU/MM3 (ref 1.8–7.7)
SODIUM BLD-SCNC: 142 MEQ/L (ref 135–145)
TOTAL PROTEIN: 5.8 G/DL (ref 6.1–8)
TROPONIN T: 0.21 NG/ML
VANCOMYCIN RESISTANT ENTEROCOCCUS: NEGATIVE
WBC # BLD: 11.1 THOU/MM3 (ref 4.8–10.8)

## 2022-02-08 PROCEDURE — 82948 REAGENT STRIP/BLOOD GLUCOSE: CPT

## 2022-02-08 PROCEDURE — 2500000003 HC RX 250 WO HCPCS: Performed by: STUDENT IN AN ORGANIZED HEALTH CARE EDUCATION/TRAINING PROGRAM

## 2022-02-08 PROCEDURE — 80053 COMPREHEN METABOLIC PANEL: CPT

## 2022-02-08 PROCEDURE — 2580000003 HC RX 258: Performed by: STUDENT IN AN ORGANIZED HEALTH CARE EDUCATION/TRAINING PROGRAM

## 2022-02-08 PROCEDURE — 2580000003 HC RX 258: Performed by: PHYSICIAN ASSISTANT

## 2022-02-08 PROCEDURE — 6360000002 HC RX W HCPCS: Performed by: STUDENT IN AN ORGANIZED HEALTH CARE EDUCATION/TRAINING PROGRAM

## 2022-02-08 PROCEDURE — 6360000002 HC RX W HCPCS: Performed by: FAMILY MEDICINE

## 2022-02-08 PROCEDURE — 36415 COLL VENOUS BLD VENIPUNCTURE: CPT

## 2022-02-08 PROCEDURE — 87641 MR-STAPH DNA AMP PROBE: CPT

## 2022-02-08 PROCEDURE — 2000000000 HC ICU R&B

## 2022-02-08 PROCEDURE — 2500000003 HC RX 250 WO HCPCS: Performed by: NURSE PRACTITIONER

## 2022-02-08 PROCEDURE — 2580000003 HC RX 258: Performed by: FAMILY MEDICINE

## 2022-02-08 PROCEDURE — 6370000000 HC RX 637 (ALT 250 FOR IP): Performed by: PHYSICIAN ASSISTANT

## 2022-02-08 PROCEDURE — 85025 COMPLETE CBC W/AUTO DIFF WBC: CPT

## 2022-02-08 PROCEDURE — 2500000003 HC RX 250 WO HCPCS: Performed by: PHYSICIAN ASSISTANT

## 2022-02-08 PROCEDURE — 2580000003 HC RX 258: Performed by: NURSE PRACTITIONER

## 2022-02-08 PROCEDURE — 6360000002 HC RX W HCPCS: Performed by: PHYSICIAN ASSISTANT

## 2022-02-08 PROCEDURE — 85610 PROTHROMBIN TIME: CPT

## 2022-02-08 PROCEDURE — 87081 CULTURE SCREEN ONLY: CPT

## 2022-02-08 PROCEDURE — 87500 VANOMYCIN DNA AMP PROBE: CPT

## 2022-02-08 PROCEDURE — 99233 SBSQ HOSP IP/OBS HIGH 50: CPT | Performed by: FAMILY MEDICINE

## 2022-02-08 PROCEDURE — 93306 TTE W/DOPPLER COMPLETE: CPT

## 2022-02-08 RX ORDER — PHENOBARBITAL SODIUM 65 MG/ML
65 INJECTION INTRAMUSCULAR ONCE
Status: COMPLETED | OUTPATIENT
Start: 2022-02-08 | End: 2022-02-08

## 2022-02-08 RX ORDER — PHENOBARBITAL SODIUM 65 MG/ML
130 INJECTION INTRAMUSCULAR
Status: DISCONTINUED | OUTPATIENT
Start: 2022-02-08 | End: 2022-02-08

## 2022-02-08 RX ORDER — PHENOBARBITAL SODIUM 65 MG/ML
260 INJECTION INTRAMUSCULAR ONCE
Status: DISCONTINUED | OUTPATIENT
Start: 2022-02-08 | End: 2022-02-15

## 2022-02-08 RX ORDER — PHENOBARBITAL SODIUM 65 MG/ML
260 INJECTION INTRAMUSCULAR
Status: DISCONTINUED | OUTPATIENT
Start: 2022-02-08 | End: 2022-02-08

## 2022-02-08 RX ORDER — ENALAPRILAT 2.5 MG/2ML
1.25 INJECTION INTRAVENOUS EVERY 6 HOURS SCHEDULED
Status: DISCONTINUED | OUTPATIENT
Start: 2022-02-08 | End: 2022-02-08

## 2022-02-08 RX ORDER — DEXMEDETOMIDINE HYDROCHLORIDE 4 UG/ML
.2-1.4 INJECTION, SOLUTION INTRAVENOUS CONTINUOUS
Status: DISCONTINUED | OUTPATIENT
Start: 2022-02-08 | End: 2022-02-09

## 2022-02-08 RX ORDER — ENALAPRILAT 2.5 MG/2ML
1.25 INJECTION INTRAVENOUS
Status: DISPENSED | OUTPATIENT
Start: 2022-02-08 | End: 2022-02-08

## 2022-02-08 RX ORDER — BUMETANIDE 0.25 MG/ML
2 INJECTION, SOLUTION INTRAMUSCULAR; INTRAVENOUS DAILY
Status: DISCONTINUED | OUTPATIENT
Start: 2022-02-08 | End: 2022-02-17

## 2022-02-08 RX ORDER — HYDRALAZINE HYDROCHLORIDE 20 MG/ML
10 INJECTION INTRAMUSCULAR; INTRAVENOUS 3 TIMES DAILY
Status: DISCONTINUED | OUTPATIENT
Start: 2022-02-08 | End: 2022-02-08

## 2022-02-08 RX ORDER — THIAMINE HYDROCHLORIDE 100 MG/ML
100 INJECTION, SOLUTION INTRAMUSCULAR; INTRAVENOUS DAILY
Status: DISCONTINUED | OUTPATIENT
Start: 2022-02-08 | End: 2022-02-09

## 2022-02-08 RX ORDER — HYDRALAZINE HYDROCHLORIDE 20 MG/ML
10 INJECTION INTRAMUSCULAR; INTRAVENOUS EVERY 6 HOURS PRN
Status: DISCONTINUED | OUTPATIENT
Start: 2022-02-08 | End: 2022-02-17 | Stop reason: HOSPADM

## 2022-02-08 RX ORDER — METOPROLOL TARTRATE 50 MG/1
50 TABLET, FILM COATED ORAL 2 TIMES DAILY
Status: DISCONTINUED | OUTPATIENT
Start: 2022-02-08 | End: 2022-02-08

## 2022-02-08 RX ORDER — PHENOBARBITAL 32.4 MG/1
64.8 TABLET ORAL 2 TIMES DAILY
Status: COMPLETED | OUTPATIENT
Start: 2022-02-09 | End: 2022-02-09

## 2022-02-08 RX ORDER — WARFARIN SODIUM 7.5 MG/1
7.5 TABLET ORAL ONCE
Status: DISCONTINUED | OUTPATIENT
Start: 2022-02-08 | End: 2022-02-09

## 2022-02-08 RX ORDER — PHENOBARBITAL 32.4 MG/1
32.4 TABLET ORAL 2 TIMES DAILY
Status: DISCONTINUED | OUTPATIENT
Start: 2022-02-10 | End: 2022-02-14

## 2022-02-08 RX ORDER — PHENOBARBITAL SODIUM 65 MG/ML
INJECTION INTRAMUSCULAR
Status: COMPLETED
Start: 2022-02-08 | End: 2022-02-08

## 2022-02-08 RX ORDER — FOLIC ACID 5 MG/ML
1 INJECTION, SOLUTION INTRAMUSCULAR; INTRAVENOUS; SUBCUTANEOUS DAILY
Status: DISCONTINUED | OUTPATIENT
Start: 2022-02-08 | End: 2022-02-17 | Stop reason: HOSPADM

## 2022-02-08 RX ORDER — METOPROLOL TARTRATE 5 MG/5ML
5 INJECTION INTRAVENOUS EVERY 6 HOURS
Status: DISCONTINUED | OUTPATIENT
Start: 2022-02-08 | End: 2022-02-09

## 2022-02-08 RX ADMIN — PHENOBARBITAL SODIUM 822.25 MG: 65 INJECTION INTRAMUSCULAR; INTRAVENOUS at 12:19

## 2022-02-08 RX ADMIN — PHENOBARBITAL SODIUM 328.9 MG: 65 INJECTION INTRAMUSCULAR at 13:40

## 2022-02-08 RX ADMIN — DILTIAZEM HYDROCHLORIDE 15 MG/HR: 5 INJECTION INTRAVENOUS at 22:00

## 2022-02-08 RX ADMIN — ENOXAPARIN SODIUM 120 MG: 150 INJECTION SUBCUTANEOUS at 12:58

## 2022-02-08 RX ADMIN — PHENOBARBITAL SODIUM 65 MG: 65 INJECTION INTRAMUSCULAR; INTRAVENOUS at 16:51

## 2022-02-08 RX ADMIN — SODIUM CHLORIDE, PRESERVATIVE FREE 10 ML: 5 INJECTION INTRAVENOUS at 13:10

## 2022-02-08 RX ADMIN — THIAMINE HYDROCHLORIDE 100 MG: 100 INJECTION, SOLUTION INTRAMUSCULAR; INTRAVENOUS at 15:17

## 2022-02-08 RX ADMIN — PHENOBARBITAL SODIUM 260 MG: 65 INJECTION INTRAMUSCULAR at 08:13

## 2022-02-08 RX ADMIN — INSULIN GLARGINE 20 UNITS: 100 INJECTION, SOLUTION SUBCUTANEOUS at 20:26

## 2022-02-08 RX ADMIN — FOLIC ACID 1 MG: 5 INJECTION, SOLUTION INTRAMUSCULAR; INTRAVENOUS; SUBCUTANEOUS at 15:14

## 2022-02-08 RX ADMIN — METOPROLOL TARTRATE 5 MG: 5 INJECTION INTRAVENOUS at 15:13

## 2022-02-08 RX ADMIN — PHENOBARBITAL SODIUM 328.9 MG: 65 INJECTION INTRAMUSCULAR at 22:37

## 2022-02-08 RX ADMIN — DEXTROSE MONOHYDRATE 3 MG/HR: 50 INJECTION, SOLUTION INTRAVENOUS at 16:45

## 2022-02-08 RX ADMIN — LORAZEPAM 3 MG: 2 INJECTION INTRAMUSCULAR; INTRAVENOUS at 01:11

## 2022-02-08 RX ADMIN — DILTIAZEM HYDROCHLORIDE 15 MG/HR: 5 INJECTION INTRAVENOUS at 12:57

## 2022-02-08 RX ADMIN — BUMETANIDE 2 MG: 0.25 INJECTION INTRAMUSCULAR; INTRAVENOUS at 15:15

## 2022-02-08 RX ADMIN — PHENOBARBITAL SODIUM 260 MG: 65 INJECTION INTRAMUSCULAR at 05:02

## 2022-02-08 RX ADMIN — INSULIN LISPRO 2 UNITS: 100 INJECTION, SOLUTION INTRAVENOUS; SUBCUTANEOUS at 11:07

## 2022-02-08 RX ADMIN — PHENOBARBITAL SODIUM 260 MG: 65 INJECTION INTRAMUSCULAR at 02:51

## 2022-02-08 RX ADMIN — HYDRALAZINE HYDROCHLORIDE 100 MG: 50 TABLET, FILM COATED ORAL at 06:02

## 2022-02-08 RX ADMIN — SODIUM CHLORIDE 127 MCG: 9 INJECTION, SOLUTION INTRAVENOUS at 18:46

## 2022-02-08 RX ADMIN — DEXMEDETOMIDINE HYDROCHLORIDE 0.2 MCG/KG/HR: 4 INJECTION, SOLUTION INTRAVENOUS at 18:52

## 2022-02-08 ASSESSMENT — PAIN SCALES - GENERAL
PAINLEVEL_OUTOF10: 0

## 2022-02-08 ASSESSMENT — PAIN SCALES - WONG BAKER: WONGBAKER_NUMERICALRESPONSE: 0

## 2022-02-08 NOTE — PROGRESS NOTES
Patient currently in bed asleep in high joe's position. Patient is restless and sitter is present. Patient is not alert and oriented. Pupils are clear round and reactive bilaterally, 2 to 3 milliliters. Skin turgor and cap refill less than 3 seconds. Lung sound are clear bilaterally anterior lobes. Skin cool. Apical pulse irregular. Unable to assess lower and upper extremities and pedal push or pull due to patient's non-compliance. Pedal pulses present bilaterally. call light and tray table within reach. Sofya Harvey SN/RSC.

## 2022-02-08 NOTE — FLOWSHEET NOTE
Utilize [facility] alcohol withdrawal scale (Based on [place] Modified Alcohol Withdrawal Scale). Tabulate score based on classifications including Tremor, Sweating, Hallucination, Orientation, and Agitation. Tremor: 2  Sweatin  Hallucinations: 0  Orientation: 2  Agitation: 1  Total Score: 6  Action perform as described below     Tremor:  No tremor is 0 points. Tremor on movement is 1 point. Tremor at rest is 2 points. Sweating: No Sweat 0 points. Moist is 1 point. Drenching sweats is 2 points. Hallucinations: No present 0 points. Dissuadable is 1 point. Not dissuadable is 2 points. Orientation: Oriented 0 points. Vague/detached 1 point. Disoriented/no contact 2 points. Agitation: Calm 0 points. Anxious 1 point. Panicky 2 points. Check scale every 2 hours. Discontinue scoring with 4 consecutive scorings of 0. Scale 0: No phenobarbital given. Re-assess every 60 minutes as needed. Scale 1-3: Phenobarbital 130 mg IV over 3 minutes. Re-assess every 60 minutes as needed. May administer every 60 minutes to a maximum dose of phenobarbital 1040 mg in 24 hours! Scale 4-8: Phenobarbital 260 mg IV over 5 minutes. Re-assess every 60 minutes as needed. May administer every 60 minutes to a maximum dose of phenobarbital 1040mg in 24 hours! Scale 9-10: Transfer to ICU (if not already in ICU). Administer 10mg/kg phenobarbital IV over 60 minutes. Maximum dose phenobarbital is 1040mg in 24 hours!

## 2022-02-08 NOTE — CARE COORDINATION
2/8/22, 12:47 PM EST    DISCHARGE PLANNING EVALUATION    Consult deferred at this time. No SW needs. An addictions consult has been placed. Johnathan Cruz

## 2022-02-08 NOTE — PROGRESS NOTES
Patient with increased agitation and intermitted confusion. Reoriented. Attempting to get out of bed but very weak and unable to do so. Scheduled phenobarb IVPB administered at this time. Continues to have Afib with frequent 5-8 beat runs of vtach.

## 2022-02-08 NOTE — PROGRESS NOTES
Hospitalist notified of increasing heart rate and continued agitation. Patient has been given medications per [facility] protocol. Cardizem drip  restarted. See MAR for details.

## 2022-02-08 NOTE — TELEPHONE ENCOUNTER
[facility] [address] notified and voiced understanding. She will speak to the patient in regards to his coumadin.

## 2022-02-08 NOTE — PROGRESS NOTES
Patient is awake and sitting up in bed. Patient is restless and agitated. Patient had a few sips of water. Patient's BP rechecked and is at 138/99. Waiting on new IV bag of diltiazem, requested from pharmacy. Patient denies any pain. Sitter is present at bedside. Call light and tray table within reach.  Fely Every SN/RSC

## 2022-02-08 NOTE — PROGRESS NOTES
Patient with increased agitation and aggression. Pulled out IV & telemetry leads, kicking, and screaming. Security called to bedside. Soft restraints to upper extremities applied. 65mg of IV phenobarbital given. Patient is now at max of phenobar until 0500 tomorrow. Dr. Nava Bolanos notified and order to transfer to ICU for precedex infusion. Called report to Robet Liner in ICU.  with sitter to ICU.

## 2022-02-08 NOTE — CARE COORDINATION
2/8/22, 7:54 AM EST  DISCHARGE PLANNING EVALUATION:    [facility] Shock       Admitted: 2/7/2022/ [address] day: 1   Location: [address] [place] [address]- Reason for admit: Alcohol abuse [F10.10]  Atrial fibrillation with rapid ventricular response (HCC) [I48.91]  Atrial fibrillation with RVR ([address] [I48.91]  AICD discharge [Z45.02]   PMH:  has a past medical history of Acute systolic CHF (congestive heart failure) ([address] Alcohol abuse, Anomalous origin of right coronary artery, Anxiety disorder, Arthritis, Atrial fibrillation ([address] CAD (coronary artery disease), Cardiomyopathy ([address] Chronic kidney disease, Depression, Diabetes mellitus ([address] Drug abuse ([address] GERD (gastroesophageal reflux disease), H/O cardiac catheterization, Hyperlipidemia, Hypertension, Intradural extramedullary spinal tumor, Lumbar spine tumor, Medtronic dual icd , Neuromuscular disorder ([address] Other disorders of kidney and ureter in diseases classified elsewhere, Paroxysmal atrial fibrillation ([address] V tach ([address] and V-tach ([address] Procedure:   2/7 ICD interrogated. 2/8 Echo to be done. Barriers to Discharge:  Admitted through ED with AICD firing and chest pain. BNP 6494.0. Troponins 0.175, 0.238, and 0.212. Pt states has not been taking his meds secondary to drinking at home. Found to be in afib with RVR with rate of 159. Started on Cardizem gtt, and rate down to 114 this am. CIWA. Consulting Cardiology. PCP: ISABELLA Woodward - CNP  Readmission Risk Score: 17.1 ( )%    Patient Goals/Plan/Treatment Preferences: Attempted to speak with pt, pt is actively withdrawing from alcohol. Nursing request CM hold off on seeing pt for now. Pt does live alone. Recently lost his job and has been independent. Monitor for needs. Transportation/Food Security/Housekeeping Addressed:  No issues identified.

## 2022-02-08 NOTE — PROGRESS NOTES
Arrived at room to complete AOD consult. Per staff at bedside, pt is not alert enough at this time to complete AOD consult. MAAME to monitor.

## 2022-02-08 NOTE — FLOWSHEET NOTE
Patient with increased agitation, CIWA score noted below. 4ml of IV Phenobarbital given.       02/08/22 0746   CIWA-Ar   BP (!) 146/105   Pulse 119   Nausea and Vomiting 0   Tactile Disturbances 0   Tremor 0   Auditory Disturbances 0   Paroxysmal Sweats 4   Visual Disturbances 0   Anxiety 2   Headache, Fullness in Head 0   Agitation 4   Orientation and Clouding of Sensorium 0   CIWA-Ar Total 10

## 2022-02-08 NOTE — FLOWSHEET NOTE
Utilize [facility] alcohol withdrawal scale (Based on [place] Modified Alcohol Withdrawal Scale). Tabulate score based on classifications including Tremor, Sweating, Hallucination, Orientation, and Agitation.     Tremor: 2  Sweatin  Hallucinations: 0  Orientation: 2  Agitation: 1  Total Score: 5  Action perform as described below      Tremor:  No tremor is 0 points.  Tremor on movement is 1 point.  Tremor at rest is 2 points. Sweating: No Sweat 0 points. Moist is 1 point.  Drenching sweats is 2 points. Hallucinations: No present 0 points. Dissuadable is 1 point. Not dissuadable is 2 points. Orientation: Oriented 0 points. Vague/detached 1 point. Disoriented/no contact 2 points. Agitation: Calm 0 points.  Anxious 1 point. Panicky 2 points.     Check scale every 2 hours.  Discontinue scoring with 4 consecutive scorings of 0. Scale 0: No phenobarbital given.  Re-assess every 60 minutes as needed. Scale 1-3: Phenobarbital 130 mg IV over 3 minutes. Re-assess every 60 minutes as needed.  May administer every 60 minutes to a maximum dose of phenobarbital 1040 mg in 24 hours! Scale 4-8: Phenobarbital 260 mg IV over 5 minutes.  Re-assess every 60 minutes as needed. May administer every 60 minutes to a maximum dose of phenobarbital 1040mg in 24 hours! Scale 9-10: Transfer to ICU (if not already in ICU).   Administer 10mg/kg phenobarbital IV over 60 minutes.  Maximum dose phenobarbital is 1040mg in 24 hours!

## 2022-02-08 NOTE — FLOWSHEET NOTE
Utilize [facility] alcohol withdrawal scale (Based on Carley Modified Alcohol Withdrawal Scale). Tabulate score based on classifications including Tremor, Sweating, Hallucination, Orientation, and Agitation.     Tremor: 2  Sweatin  Hallucinations: 0  Orientation: 1  Agitation: 2  Total Score: 6  Action perform as described below      Tremor:  No tremor is 0 points. Tremor on movement is 1 point. Tremor at rest is 2 points. Sweating: No Sweat 0 points. Moist is 1 point. Drenching sweats is 2 points. Hallucinations: No present 0 points. Dissuadable is 1 point. Not dissuadable is 2 points. Orientation: Oriented 0 points. Vague/detached 1 point. Disoriented/no contact 2 points. Agitation: Calm 0 points. Anxious 1 point. Panicky 2 points.     Check scale every 2 hours. Discontinue scoring with 4 consecutive scorings of 0. Scale 0: No phenobarbital given. Re-assess every 60 minutes as needed. Scale 1-3: Phenobarbital 130 mg IV over 3 minutes. Re-assess every 60 minutes as needed. May administer every 60 minutes to a maximum dose of phenobarbital 1040 mg in 24 hours! Scale 4-8: Phenobarbital 260 mg IV over 5 minutes. Re-assess every 60 minutes as needed. May administer every 60 minutes to a maximum dose of phenobarbital 1040mg in 24 hours! Scale 9-10: Transfer to ICU (if not already in ICU). Administer 10mg/kg phenobarbital IV over 60 minutes.   Maximum dose phenobarbital is 1040mg in 24 hours!

## 2022-02-08 NOTE — PROGRESS NOTES
Clinical Pharmacy Note    Harmeet Cisneros is a 64 y.o. male for whom pharmacy has been asked to manage warfarin therapy. 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    Anxiety disorder     Arthritis     Atrial fibrillation ([address]     CAD (coronary artery disease) 3/25/2014    s/p CABG in may 2014    Cardiomyopathy [facility])     Chronic kidney disease     Depression     Diabetes mellitus ([address]     Drug abuse ([address]     hx of cacaine abuse, stopped abusing drugs in 2012    GERD (gastroesophageal reflux disease)     H/O cardiac catheterization 4/30/2014    Hyperlipidemia     Hypertension     Intradural extramedullary spinal tumor     Lumbar spine tumor     [employer] dual icd      Neuromuscular disorder ([address]     Other disorders of kidney and ureter in diseases classified elsewhere     Paroxysmal atrial fibrillation ([address] 7/22/2014    V tach ([address]     V-tach ([address]     s/p ablation in dec 2014              Recent Labs     02/07/22  1927   INR 1.05     Recent Labs     02/07/22  1313   HGB 12.2*   HCT 36.6*        Current warfarin drug-drug interactions: ticagrelor (HM)    Date INR Warfarin Dose   2/07/2022 1.05 7.5mg                                   PT/INR or POC-INR will be monitored routinely until therapeutic INR is achieved.     Thank you for the consult,   AYRITZA Cavazos, MOP  2/7/2022     8:12 PM

## 2022-02-08 NOTE — PROGRESS NOTES
Patient was restless and agitated. Patient is now asleep in the semi joe's position. BP was 206/ 102, lopressor was given BP is now 177/95 after 10 minutes. Sitter is present, call light is within reach. Candi Face SN/RSC.

## 2022-02-08 NOTE — TELEPHONE ENCOUNTER
Javad a Pharmacy Intern at [facility] called office needing to clarify pt dose of Coumadin. He is inpatient right now. They contacted [facility] and was advised pt is not a pt of theirs. Per Epic last Coumadin I could find that prescribed was 11/8/2019. Call Javad back at [phone]. Please advise.

## 2022-02-08 NOTE — PROGRESS NOTES
Patient is still resting and is less agitated. Patient is currently getting an Echo performed at bedside. Patient's medications have been switched to administer via IV to avoid aspiration per primary RN. Call light and tray table within reach. Fabiola DUMONT/JERMAIN.

## 2022-02-08 NOTE — PROGRESS NOTES
Clinical Pharmacy Note    Warfarin consult follow-up    Recent Labs     02/08/22  0528   INR 1.02     Recent Labs     02/07/22  1313 02/08/22  0528   HGB 12.2* 12.5*   HCT 36.6* 38.0*    120*     Significant Drug-Drug Interactions:  New warfarin drug-drug interactions: none  Discontinued drug-drug interactions: none  Current warfarin drug-drug interactions: ticagrelor (HM)     Date INR Warfarin Dose   2/07/2022 1.05 7.5 mg   2/8/2022   1.02 7.5 mg                                              Notes:                   PT/INR or POC-INR will be monitored routinely until therapeutic INR is achieved.

## 2022-02-08 NOTE — PROGRESS NOTES
PROGRESS NOTE      Patient:  Harmeet Cisneros      Unit/Bed:4D-18/018-A    YOB: 1965    MRN: [id number]       Acct: [de-identified]     PCP: ISABELLA Tejada CNP    Date of Admission: 2/7/2022      Assessment/Plan:      Active Hospital Problems    Diagnosis Date Noted    Atrial fibrillation with rapid ventricular response ([address] [I48.91] 02/07/2022     1. A. fib with RVR on Warfarin: Confirmed on EKG. Patient started on a Cardizem drip in ED. Noted to be on Cordarone, and Mexitil at home, hold these for now. Patient altered, unable to comply with PO intake. Metope transitioned to IV. Continue with warfarin, pharmacy to dose. Telemetry monitor. 2. Chest pain, likely secondary to AICD firing / Afib with RVR: Rule out ACS. Troponins stable. Cardiology consulted. N.p.o. pending cardiology evaluation. EKG shows A. fib with RVR and nonspecific ST and T wave abnormality. 3. Alcohol abuse: Concern for withdrawal. phenobarb protocol was used overnight due to agitation unresponsive to ativan. Daily IV folic acid, thiamine. Severely agitated in the afternoon, transferred to ICU for precedex infusion. 4. Elevated troponin, chronic: Patient has chronic troponin elevation, however this value elevated compared to previous. Likely secondary to AICD firing. Will trend. EKG shows A. fib with RVR, nonspecific ST and T wave abnormality. Cardiologist has been consulted. 5. AG metabolic acidosis: Resolved. 6. Acute left knee pain: Patient underwent imaging in the ED which was negative for acute fracture. Apply heat. Tylenol. PT/OT. 7. S/p mechanical fall: Patient denies any loss of consciousness or striking his head. He slipped on ice. PT/OT. No acute fractures noted on imaging  8. Essential hypertension: Patient is on multiple agents, including Cardura, Norvasc, Lopressor, hydralazine, Cozaar, and metoprolol. Will resume these in a stepwise fashion to avoid hypotension. On IV metope.  Hypertensive in the afternoon, cardene drip started for acute bp control. Oral BP meds held due to patient unable to tolerate PO intake in setting of agitation. 9. CAD s/p CABG: Continue Brilinta, beta-blocker, statin, Imdur. Beta blocker transitioned to IV. 10. CKD stage III: Creatinine noted at 2.2, this is improving previous value 1/22 at 2.7. Avoid nephrotoxic agents. 11. IDDM II: Continue with home insulin, 20 U nightly. medium SSI. Hypoglycemia protocol in place. A1c 7.0, most recent value 8.1 in 11/21. 12. Anxiety: Resume patient's home Xanax  13. Leukocytosis  14. Hypocalcemia    Code Status: Full Code    Transferred to ICU for further management of alcohol withdrawal w/ precedex infusion.      -----------------------------------------------------------------------------------------------------------------------------------------------    Chief Complaint: Chest pain, ICD firing. Hospital Course: Patient is a 59-year-old male with past medical history of alcohol abuse, and CHF, anxiety, CAD, A. fib on [address], HLD, essential hypertension, and CKD who presents to [facility] ED with chief complaint of chest pain secondary to his AICD firing. History obtained via patient and chart review. Patient is a poor historian.     Patient reports that approximately 11:00 this morning he felt his ICD fire. Within the next hour, patient states that his device fired up to 5 times. Patient relates that he has not been taking his medications for the past 5 to 6 days as he has been drinking. Patient reports this is happened in the past, and relates it to his history of drinking. Patient states that he has been on a binge, drinking about 1/5 of apple [place] whiskey for the past 5 to 6 days. Patient reports that on Saturday he fell on ice which led to left knee pain. Patient is on Coumadin, he denies striking his head or any loss of consciousness. Patient denies any other symptoms, including fever/chills, nausea/vomiting/diarrhea.   He reports chest pain this morning when he felt his AICD fire, however this has improved. Patient denies shortness of breath. Patient reports left knee pain.     Upon exam, patient is agitated and fidgeting. He reports that he has severe anxiety and does not want to be admitted. Discussed with patient that if he did not want admission that he would have to sign out AMA given his condition. Patient will be admitted to stepdown with cardiology consult. Subjective:  BP increased in the afternoon, tx with cardene drip. Unable to take po meds during the day due to agitation. Pulling out IV and combative. Started on phenobarb protocol, ineffective. transferred to the icu for further management w/ precedex infusion.     Medications:  Reviewed    Infusion Medications    niCARdipine 1 mg/hr (02/08/22 2000)    dexmedetomidine 0.3 mcg/kg/hr (02/08/22 1925)    dilTIAZem (CARDIZEM) 125 mg in dextrose 5% 125 mL infusion 15 mg/hr (02/08/22 1900)    sodium chloride      dextrose       Scheduled Medications    warfarin  7.5 mg Oral Once    PHENobarbital  260 mg IntraVENous Once    enoxaparin  1 mg/kg SubCUTAneous Q12H    bumetanide  2 mg IntraVENous Daily    PHENobarbital IVPB  4 mg/kg (Ideal) IntraVENous Q4H    Followed by   Genevieve Devlin ON 2/9/2022] PHENobarbital  64.8 mg Oral BID    Followed by   Genevieve Chappaqua ON 2/10/2022] PHENobarbital  32.4 mg Oral BID    metoprolol  5 mg IntraVENous B6O    folic acid  1 mg IntraMUSCular Daily    thiamine  100 mg IntraMUSCular Daily    sodium chloride flush  5-40 mL IntraVENous 2 times per day    multivitamin  1 tablet Oral Daily    atorvastatin  80 mg Oral Nightly    [Held by provider] isosorbide mononitrate  120 mg Oral Daily    ticagrelor  90 mg Oral BID    insulin lispro  0-12 Units SubCUTAneous TID WC    insulin lispro  0-6 Units SubCUTAneous Nightly    [Held by provider] hydrALAZINE  100 mg Oral 3 times per day    [Held by provider] losartan  50 mg Oral Daily    nicotine  1 patch TransDERmal Q24H    insulin glargine  20 Units SubCUTAneous Nightly    warfarin placeholder: dosing by pharmacy   Other RX Placeholder     PRN Meds: hydrALAZINE, enalaprilat, sodium chloride flush, sodium chloride, ondansetron **OR** ondansetron, polyethylene glycol, acetaminophen **OR** acetaminophen, nitroGLYCERIN, glucose, glucagon (rDNA), dextrose, ALPRAZolam, dextrose bolus (hypoglycemia) **OR** dextrose bolus (hypoglycemia)      Intake/Output Summary (Last 24 hours) at 2/8/2022 2034  Last data filed at 2/8/2022 1900  Gross per 24 hour   Intake 846.72 ml   Output 1425 ml   Net -578.28 ml       Diet:  ADULT DIET; Regular    Exam:  /81   Pulse 98   Temp 98.5 °F (36.9 °C) (Axillary)   Resp 16   Ht 6' 2\" (1.88 m)   Wt 262 lb 5.6 oz (119 kg)   SpO2 94%   BMI 33.68 kg/m²     Physical Exam:  General appearance: agitated, not oriented. Appears ill and uncomfortable. No seizures. HEENT:  Normal cephalic, atraumatic without obvious deformity. Conjunctivae clear. Clear oral mucosa  Neck: Supple, with full range of motion. No jugular venous distention. Trachea midline. Respiratory:  Normal respiratory effort. Clear to auscultation, bilaterally without Rales/Wheezes/Rhonchi. Cardiovascular: irregularly irregular  Abdomen: Soft, non-tender, non-distended with normal bowel sounds. Musculoskeletal:  No clubbing, cyanosis or edema bilaterally. Full range of motion without deformity. Skin: No rashes or lesions. Neurological exam agitated, not oriented.    Exam of extremities: peripheral pulses normal, no pedal or leg edema, no clubbing or cyanosis    Labs:   Recent Labs     02/07/22  1313 02/08/22  0528   WBC 8.2 11.1*   HGB 12.2* 12.5*   HCT 36.6* 38.0*    120*     Recent Labs     02/07/22  1313 02/08/22  0528    142   K 4.4 3.8    109   CO2 15* 20*   BUN 31* 30*   CREATININE 2.1* 2.0*   CALCIUM 7.2* 7.8*     Recent Labs     02/07/22  1313 02/08/22  0528   AST 28 30   ALT 22 20   BILIDIR <0. 2  --    BILITOT 0.5 0.9   ALKPHOS 101 99     Recent Labs     02/07/22  1927 02/08/22  0528   INR 1.05 1.02     No results for input(s): Erle Keepers in the last 72 hours. Urinalysis:      Lab Results   Component Value Date    NITRU NEGATIVE 01/20/2022    WBCUA 0-2 01/20/2022    BACTERIA NONE SEEN 01/20/2022    RBCUA 0-2 01/20/2022    BLOODU NEGATIVE 01/20/2022    SPECGRAV 1.018 01/20/2022    GLUCOSEU NEGATIVE 09/24/2019       Radiology:  XR HUMERUS RIGHT (MIN 2 VIEWS)   Final Result   No acute process            **This report has been created using voice recognition software. It may contain minor errors which are inherent in voice recognition technology. **      Final report electronically signed by Dr. Umesh Skinner on 2/7/2022 2:25 PM      XR RADIUS ULNA RIGHT (2 VIEWS)   Final Result   No acute process            **This report has been created using voice recognition software. It may contain minor errors which are inherent in voice recognition technology. **      Final report electronically signed by Dr. Umesh Skinner on 2/7/2022 2:34 PM      XR KNEE LEFT (MIN 4 VIEWS)   Final Result   No acute abnormality            **This report has been created using voice recognition software. It may contain minor errors which are inherent in voice recognition technology. **      Final report electronically signed by Dr. Umesh Skinner on 2/7/2022 2:31 PM      XR CHEST LATERAL   Final Result   No acute process            **This report has been created using voice recognition software. It may contain minor errors which are inherent in voice recognition technology. **      Final report electronically signed by Dr. Umesh Skinner on 2/7/2022 2:33 PM      XR CHEST PORTABLE   Final Result   No acute intrathoracic process. **This report has been created using voice recognition software. It may contain minor errors which are inherent in voice recognition technology. **      Final report electronically signed by  Baxter Press on 2/7/2022 1:35 PM          Electronically signed by Lea Lyle MD on 2/8/2022 at 8:34 PM

## 2022-02-08 NOTE — PROGRESS NOTES
Pharmacy Medication History Note    List of current medications patient is taking is complete. Source of information: Medication list from Dr. Saroj Carlos office    Changes made to medication list:  Medications removed (include reason, ex. therapy complete or physician discontinued):  Mexiletine 150mg - Medication is no longer on patient's medication list. Prescription  on 2021    Medications added/doses adjusted:  None    Other notes (ex. Recent course of antibiotics, Coumadin dosing):  Called [facility] to confirm warfarin dosing. [facility] states that he is not a current patient with their clinic. Called Dr. Saroj Carlos family medicine office to confirm patients warfarin dosing, last prescribed on Dec. 26th, 2019. Denies use of other OTC or herbal medications.     Allergies reviewed    Electronically signed by Horace Taylor on 2022 at 1:16 PM

## 2022-02-09 ENCOUNTER — APPOINTMENT (OUTPATIENT)
Dept: GENERAL RADIOLOGY | Age: 57
DRG: 308 | End: 2022-02-09
Payer: COMMERCIAL

## 2022-02-09 LAB
AMPHETAMINE+METHAMPHETAMINE URINE SCREEN: NEGATIVE
ANION GAP SERPL CALCULATED.3IONS-SCNC: 10 MEQ/L (ref 8–16)
BARBITURATE QUANTITATIVE URINE: POSITIVE
BENZODIAZEPINE QUANTITATIVE URINE: NEGATIVE
BUN BLDV-MCNC: 26 MG/DL (ref 7–22)
CALCIUM IONIZED: 1.1 MMOL/L (ref 1.12–1.32)
CALCIUM SERPL-MCNC: 8 MG/DL (ref 8.5–10.5)
CANNABINOID QUANTITATIVE URINE: NEGATIVE
CHLORIDE BLD-SCNC: 111 MEQ/L (ref 98–111)
CO2: 23 MEQ/L (ref 23–33)
COCAINE METABOLITE QUANTITATIVE URINE: NEGATIVE
CREAT SERPL-MCNC: 2 MG/DL (ref 0.4–1.2)
ERYTHROCYTE [DISTWIDTH] IN BLOOD BY AUTOMATED COUNT: 13.5 % (ref 11.5–14.5)
ERYTHROCYTE [DISTWIDTH] IN BLOOD BY AUTOMATED COUNT: 49.1 FL (ref 35–45)
GFR SERPL CREATININE-BSD FRML MDRD: 35 ML/MIN/1.73M2
GLUCOSE BLD-MCNC: 140 MG/DL (ref 70–108)
GLUCOSE BLD-MCNC: 141 MG/DL (ref 70–108)
GLUCOSE BLD-MCNC: 146 MG/DL (ref 70–108)
GLUCOSE BLD-MCNC: 160 MG/DL (ref 70–108)
GLUCOSE BLD-MCNC: 96 MG/DL (ref 70–108)
HCT VFR BLD CALC: 35.9 % (ref 42–52)
HEMOGLOBIN: 12 GM/DL (ref 14–18)
INR BLD: 1.33 (ref 0.85–1.13)
MCH RBC QN AUTO: 33.4 PG (ref 26–33)
MCHC RBC AUTO-ENTMCNC: 33.4 GM/DL (ref 32.2–35.5)
MCV RBC AUTO: 100 FL (ref 80–94)
OPIATES, URINE: NEGATIVE
OXYCODONE: NEGATIVE
PHENCYCLIDINE QUANTITATIVE URINE: NEGATIVE
PLATELET # BLD: 88 THOU/MM3 (ref 130–400)
PMV BLD AUTO: 10 FL (ref 9.4–12.4)
POTASSIUM SERPL-SCNC: 3.4 MEQ/L (ref 3.5–5.2)
RBC # BLD: 3.59 MILL/MM3 (ref 4.7–6.1)
SODIUM BLD-SCNC: 144 MEQ/L (ref 135–145)
WBC # BLD: 6.9 THOU/MM3 (ref 4.8–10.8)

## 2022-02-09 PROCEDURE — 80307 DRUG TEST PRSMV CHEM ANLYZR: CPT

## 2022-02-09 PROCEDURE — 80048 BASIC METABOLIC PNL TOTAL CA: CPT

## 2022-02-09 PROCEDURE — APPSS180 APP SPLIT SHARED TIME > 60 MINUTES: Performed by: NURSE PRACTITIONER

## 2022-02-09 PROCEDURE — 6370000000 HC RX 637 (ALT 250 FOR IP): Performed by: FAMILY MEDICINE

## 2022-02-09 PROCEDURE — 2580000003 HC RX 258: Performed by: PHYSICIAN ASSISTANT

## 2022-02-09 PROCEDURE — 6360000002 HC RX W HCPCS: Performed by: NURSE PRACTITIONER

## 2022-02-09 PROCEDURE — 2580000003 HC RX 258: Performed by: NURSE PRACTITIONER

## 2022-02-09 PROCEDURE — 6360000002 HC RX W HCPCS: Performed by: STUDENT IN AN ORGANIZED HEALTH CARE EDUCATION/TRAINING PROGRAM

## 2022-02-09 PROCEDURE — 2000000000 HC ICU R&B

## 2022-02-09 PROCEDURE — 94761 N-INVAS EAR/PLS OXIMETRY MLT: CPT

## 2022-02-09 PROCEDURE — 85027 COMPLETE CBC AUTOMATED: CPT

## 2022-02-09 PROCEDURE — 74018 RADEX ABDOMEN 1 VIEW: CPT

## 2022-02-09 PROCEDURE — 2500000003 HC RX 250 WO HCPCS: Performed by: NURSE PRACTITIONER

## 2022-02-09 PROCEDURE — 6370000000 HC RX 637 (ALT 250 FOR IP): Performed by: NURSE PRACTITIONER

## 2022-02-09 PROCEDURE — 2500000003 HC RX 250 WO HCPCS: Performed by: PHYSICIAN ASSISTANT

## 2022-02-09 PROCEDURE — 6370000000 HC RX 637 (ALT 250 FOR IP): Performed by: PHYSICIAN ASSISTANT

## 2022-02-09 PROCEDURE — 82948 REAGENT STRIP/BLOOD GLUCOSE: CPT

## 2022-02-09 PROCEDURE — 85610 PROTHROMBIN TIME: CPT

## 2022-02-09 PROCEDURE — 82330 ASSAY OF CALCIUM: CPT

## 2022-02-09 PROCEDURE — 2500000003 HC RX 250 WO HCPCS: Performed by: STUDENT IN AN ORGANIZED HEALTH CARE EDUCATION/TRAINING PROGRAM

## 2022-02-09 PROCEDURE — 6360000002 HC RX W HCPCS

## 2022-02-09 PROCEDURE — 99222 1ST HOSP IP/OBS MODERATE 55: CPT | Performed by: INTERNAL MEDICINE

## 2022-02-09 PROCEDURE — 99291 CRITICAL CARE FIRST HOUR: CPT | Performed by: INTERNAL MEDICINE

## 2022-02-09 RX ORDER — WARFARIN SODIUM 5 MG/1
5 TABLET ORAL
Status: COMPLETED | OUTPATIENT
Start: 2022-02-09 | End: 2022-02-09

## 2022-02-09 RX ORDER — LORAZEPAM 2 MG/ML
0.5 INJECTION INTRAMUSCULAR ONCE
Status: COMPLETED | OUTPATIENT
Start: 2022-02-09 | End: 2022-02-09

## 2022-02-09 RX ORDER — LORAZEPAM 2 MG/ML
INJECTION INTRAMUSCULAR
Status: COMPLETED
Start: 2022-02-09 | End: 2022-02-09

## 2022-02-09 RX ORDER — AMIODARONE HYDROCHLORIDE 200 MG/1
200 TABLET ORAL DAILY
Status: DISCONTINUED | OUTPATIENT
Start: 2022-02-09 | End: 2022-02-17 | Stop reason: HOSPADM

## 2022-02-09 RX ORDER — WARFARIN SODIUM 5 MG/1
5 TABLET ORAL
Status: DISCONTINUED | OUTPATIENT
Start: 2022-02-09 | End: 2022-02-09

## 2022-02-09 RX ORDER — LORAZEPAM 2 MG/ML
2 INJECTION INTRAMUSCULAR EVERY 4 HOURS PRN
Status: DISCONTINUED | OUTPATIENT
Start: 2022-02-09 | End: 2022-02-14

## 2022-02-09 RX ORDER — DEXMEDETOMIDINE HYDROCHLORIDE 4 UG/ML
.2-1.4 INJECTION, SOLUTION INTRAVENOUS CONTINUOUS
Status: DISCONTINUED | OUTPATIENT
Start: 2022-02-09 | End: 2022-02-14

## 2022-02-09 RX ORDER — LORAZEPAM 2 MG/ML
0.5 INJECTION INTRAMUSCULAR EVERY 4 HOURS PRN
Status: DISCONTINUED | OUTPATIENT
Start: 2022-02-09 | End: 2022-02-09

## 2022-02-09 RX ADMIN — DEXMEDETOMIDINE HYDROCHLORIDE 1.4 MCG/KG/HR: 4 INJECTION, SOLUTION INTRAVENOUS at 05:02

## 2022-02-09 RX ADMIN — INSULIN LISPRO 1 UNITS: 100 INJECTION, SOLUTION INTRAVENOUS; SUBCUTANEOUS at 20:42

## 2022-02-09 RX ADMIN — ENOXAPARIN SODIUM 120 MG: 150 INJECTION SUBCUTANEOUS at 12:04

## 2022-02-09 RX ADMIN — LORAZEPAM 2 MG: 2 INJECTION INTRAMUSCULAR; INTRAVENOUS at 14:57

## 2022-02-09 RX ADMIN — THIAMINE HYDROCHLORIDE 200 MG: 100 INJECTION, SOLUTION INTRAMUSCULAR; INTRAVENOUS at 20:49

## 2022-02-09 RX ADMIN — LORAZEPAM 2 MG: 2 INJECTION INTRAMUSCULAR; INTRAVENOUS at 04:09

## 2022-02-09 RX ADMIN — DEXMEDETOMIDINE HYDROCHLORIDE 1.4 MCG/KG/HR: 4 INJECTION, SOLUTION INTRAVENOUS at 23:49

## 2022-02-09 RX ADMIN — DEXMEDETOMIDINE HYDROCHLORIDE 1.4 MCG/KG/HR: 4 INJECTION, SOLUTION INTRAVENOUS at 07:42

## 2022-02-09 RX ADMIN — ACETAMINOPHEN 650 MG: 325 TABLET ORAL at 22:42

## 2022-02-09 RX ADMIN — DILTIAZEM HYDROCHLORIDE 15 MG/HR: 5 INJECTION INTRAVENOUS at 17:56

## 2022-02-09 RX ADMIN — Medication 1 TABLET: at 09:49

## 2022-02-09 RX ADMIN — METOPROLOL TARTRATE 5 MG: 5 INJECTION INTRAVENOUS at 08:48

## 2022-02-09 RX ADMIN — ENOXAPARIN SODIUM 120 MG: 150 INJECTION SUBCUTANEOUS at 00:30

## 2022-02-09 RX ADMIN — PHENOBARBITAL 64.8 MG: 32.4 TABLET ORAL at 09:49

## 2022-02-09 RX ADMIN — DEXMEDETOMIDINE HYDROCHLORIDE 1.4 MCG/KG/HR: 4 INJECTION, SOLUTION INTRAVENOUS at 15:45

## 2022-02-09 RX ADMIN — BUMETANIDE 2 MG: 0.25 INJECTION INTRAMUSCULAR; INTRAVENOUS at 08:51

## 2022-02-09 RX ADMIN — INSULIN GLARGINE 20 UNITS: 100 INJECTION, SOLUTION SUBCUTANEOUS at 20:43

## 2022-02-09 RX ADMIN — DEXMEDETOMIDINE HYDROCHLORIDE 1.4 MCG/KG/HR: 4 INJECTION, SOLUTION INTRAVENOUS at 03:00

## 2022-02-09 RX ADMIN — DEXMEDETOMIDINE HYDROCHLORIDE 1.4 MCG/KG/HR: 4 INJECTION, SOLUTION INTRAVENOUS at 05:07

## 2022-02-09 RX ADMIN — POTASSIUM BICARBONATE 20 MEQ: 782 TABLET, EFFERVESCENT ORAL at 13:12

## 2022-02-09 RX ADMIN — INSULIN LISPRO 2 UNITS: 100 INJECTION, SOLUTION INTRAVENOUS; SUBCUTANEOUS at 09:03

## 2022-02-09 RX ADMIN — FOLIC ACID 1 MG: 5 INJECTION, SOLUTION INTRAMUSCULAR; INTRAVENOUS; SUBCUTANEOUS at 08:55

## 2022-02-09 RX ADMIN — ATORVASTATIN CALCIUM 80 MG: 80 TABLET, FILM COATED ORAL at 20:10

## 2022-02-09 RX ADMIN — DEXMEDETOMIDINE HYDROCHLORIDE 1.4 MCG/KG/HR: 4 INJECTION, SOLUTION INTRAVENOUS at 13:13

## 2022-02-09 RX ADMIN — HYDRALAZINE HYDROCHLORIDE 100 MG: 50 TABLET, FILM COATED ORAL at 13:12

## 2022-02-09 RX ADMIN — DILTIAZEM HYDROCHLORIDE 15 MG/HR: 5 INJECTION INTRAVENOUS at 05:14

## 2022-02-09 RX ADMIN — DEXMEDETOMIDINE HYDROCHLORIDE 1.4 MCG/KG/HR: 4 INJECTION, SOLUTION INTRAVENOUS at 10:30

## 2022-02-09 RX ADMIN — THIAMINE HYDROCHLORIDE 100 MG: 100 INJECTION, SOLUTION INTRAMUSCULAR; INTRAVENOUS at 09:10

## 2022-02-09 RX ADMIN — TICAGRELOR 90 MG: 90 TABLET ORAL at 20:10

## 2022-02-09 RX ADMIN — PHENOBARBITAL 64.8 MG: 32.4 TABLET ORAL at 20:12

## 2022-02-09 RX ADMIN — HYDRALAZINE HYDROCHLORIDE 10 MG: 20 INJECTION INTRAMUSCULAR; INTRAVENOUS at 20:05

## 2022-02-09 RX ADMIN — AMIODARONE HYDROCHLORIDE 200 MG: 200 TABLET ORAL at 14:15

## 2022-02-09 RX ADMIN — TICAGRELOR 90 MG: 90 TABLET ORAL at 09:49

## 2022-02-09 RX ADMIN — SODIUM CHLORIDE, PRESERVATIVE FREE 10 ML: 5 INJECTION INTRAVENOUS at 20:09

## 2022-02-09 RX ADMIN — SODIUM CHLORIDE, PRESERVATIVE FREE 10 ML: 5 INJECTION INTRAVENOUS at 09:12

## 2022-02-09 RX ADMIN — DEXMEDETOMIDINE HYDROCHLORIDE 1.4 MCG/KG/HR: 4 INJECTION, SOLUTION INTRAVENOUS at 18:23

## 2022-02-09 RX ADMIN — LORAZEPAM 0.5 MG: 2 INJECTION INTRAMUSCULAR at 01:45

## 2022-02-09 RX ADMIN — LORAZEPAM 0.5 MG: 2 INJECTION INTRAMUSCULAR; INTRAVENOUS at 02:07

## 2022-02-09 RX ADMIN — LORAZEPAM 0.5 MG: 2 INJECTION INTRAMUSCULAR; INTRAVENOUS at 01:45

## 2022-02-09 RX ADMIN — HYDRALAZINE HYDROCHLORIDE 100 MG: 50 TABLET, FILM COATED ORAL at 22:43

## 2022-02-09 RX ADMIN — WARFARIN SODIUM 5 MG: 5 TABLET ORAL at 13:12

## 2022-02-09 RX ADMIN — DEXMEDETOMIDINE HYDROCHLORIDE 1.4 MCG/KG/HR: 4 INJECTION, SOLUTION INTRAVENOUS at 21:03

## 2022-02-09 RX ADMIN — HYDRALAZINE HYDROCHLORIDE 10 MG: 20 INJECTION INTRAMUSCULAR; INTRAVENOUS at 14:11

## 2022-02-09 ASSESSMENT — ENCOUNTER SYMPTOMS
COUGH: 0
COLOR CHANGE: 0
BACK PAIN: 0
SORE THROAT: 0
EYE REDNESS: 0
NAUSEA: 0
CHEST TIGHTNESS: 0
ABDOMINAL PAIN: 0
SHORTNESS OF BREATH: 0
SINUS PRESSURE: 0
ABDOMINAL DISTENTION: 0

## 2022-02-09 ASSESSMENT — PAIN SCALES - GENERAL
PAINLEVEL_OUTOF10: 0
PAINLEVEL_OUTOF10: 0

## 2022-02-09 NOTE — PROGRESS NOTES
[address] THERAPY MISSED TREATMENT NOTE  [facility] ICU 4D  4D-18/018-A      Date: 2022  Patient Name: Steve Hurst        CSN: [id number]   : 1965  (64 y.o.)  Gender: male   Referring Practitioner: Michelle Herbert PA-C  Diagnosis: Alcohol Abuse         REASON FOR MISSED TREATMENT: Pt on hold d/t increased agitation and inappropriateness requiring increased sedation.

## 2022-02-09 NOTE — PLAN OF CARE
Problem: Falls - Risk of:  Goal: Will remain free from falls  Description: Will remain free from falls  Outcome: Ongoing  Note: Sitter at bedside. Bed alarm active     Problem: Falls - Risk of:  Goal: Absence of physical injury  Description: Absence of physical injury  Outcome: Ongoing     Problem: Non-Violent Restraints  Goal: Removal from restraints as soon as assessed to be safe  Outcome: Ongoing  Note: Unable to remove restraints at this time. Pt continues to be combative and verbally and physically aggressive towards staff.      Problem: Non-Violent Restraints  Goal: No harm/injury to patient while restraints in use  Outcome: Ongoing     Problem: Non-Violent Restraints  Goal: Patient's dignity will be maintained  Outcome: Ongoing

## 2022-02-09 NOTE — CONSULTS
Patient:  Harmeet Cisneros    Unit/Bed:4D-18/018-A  MRN: [id number]   PCP: ISABELLA Tejada CNP  Date of Admission: 2/7/2022    Assessment and Plan(All pulmonary edema, renal failure, PE, and respiratory failure diagnoses are acute in nature unless otherwise specified):        1. ETOH withdrawal: in acute withdrawal. Endorses recent alcohol binge. Positive for tachycardia, hypertension, agitation, confusion. Patient has received maximal dosing per protocol of phenobarbital in last 24 hours. Added precedex infusion. Sitter. Will also send urine drug screen given history of cocaine use. Continue thiamine, multivitamin, and folic acid. 2. Anion gap metabolic acidosis: resolving. Secondary to recent alcohol intoxication. BMP daily to monitor. 3. AICD firing: Likely due to spontaneous arrhythmia from low EF. EF 25-30% on echo 2/8/22. Cardiology consulted. Device interrogation report pending. No further shocks have occurred during inpatient stay thus far. Denies chest pain. 4. Chronic systolic HFrEF: secondary to ischemic cardiomyopathy, and likely 2/2 chronic alcohol abuse as well. Addiction services consulted. Echo 2/8/22 shows slight EF drop from 30-35% (2020), now to 25-30%. No signs of volume overload on exam. Patient is warm and dry. Continue chronic bumex. Holding ARB due to NPO. Continue IV metoprolol. Strict I/O. Daily weights. Cardiology consulted. 5. Chronic Afib: Rate is controlled presently. Continue BB. Continue cardizem infusion. Patient takes coumadin chronically (recently non-compliant). On full-dose lovenox bridge to coumadin. Pharmacy to dose coumadin. INR daily. 6. Elevated troponin: Acute on chronic elevation. Peaked at 0.238 on 2/7/22 and trended down after. No acute ischemic change from prior ECG. No wall motion abnormalities reported on echo 2/8/22. Could be secondary to AICD firing. Patient was also in Afib with RVR on presentation.  Has denied chest pain since AICD firing episode at home prior to presentation. 7. S/p mechanical fall: Patient denies any loss of consciousness or striking his head. He slipped on ice. He was acutely intoxicated. PT/OT. No acute fractures noted on imaging. 8. ETOH abuse: Has reportedly had problems with alcohol abuse for >15 years. Addiction services consulted. 9. Tobacco abuse: nicotine patch continued. 10. CKD stage 3B: Cr stable at his baseline. Avoid nephrotoxic agents. BMP daily. 11. DM type II: continue SSI lispro coverage, and lantus 20 units nightly. 12. HTN:  Medications being added back in step-wise fashion. Limited by not able to take PO at this time due to etoh withdrawal. Patient has been recently non-compliant with meds. Continue IV metoprolol. Continue IV Bumex. Continue cardizem infusion as above. PRN hydralazine. 13. HLD, history of: continue statin. 14. CAD, history of: S/p CABG 3-vessel in 2014. Continue brilinta. Follows alysha Quevedo. 15. Acute left knee pain (resolved): Patient underwent imaging in the ED which was negative for acute fracture. Apply heat/ice PRN. Tylenol. PT/OT. CC:  AICD firing  HPI: Ángel Rojo is a 63 y/o male former-smoker with PMH of Afib on coumadin, CAD s/p CABG 3-vessel in 7198, chronic systolic HFrEF, AICD placement, CKD, HLD, HTN, alcohol abuse, cocaine use (quit in 2012), DM type II, anxiety, depression, GERD, arthritis. Patient presented to [facility] ED on 2/7/22 after chest pain following his AICD firing. Patient reported that his device had fired at home up to 5 times on 2/7/2022, prompting him to seek care. He reported chest pain immediately following these events. However, his chest pain had resolved shortly thereafter. He stated that he had not been taking his medications for the past 5 to 6 days, as he has been drinking. He reported that this is happened to him in the past, and was related to times where he was drinking heavily.   Patient reported he has been drinking about 1/5 of apple crown whiskey for the past 5 to 6 days. He denied any recent infectious signs or symptoms, fever/chills, nausea/vomiting/diarrhea, dyspnea. Upon presentation, patient was agitated and fidgeting. He was admitted to the hospitalist service. His medications were resumed, including anticoagulation for chronic A. fib. Cardiology was consulted for AICD firing, as well as acute on chronic troponin elevation. He had no acute ischemic changes on twelve-lead ECG from prior. Pacer interrogation and echocardiogram were ordered. Patient developed worsening agitation secondary to alcohol withdrawal syndrome on the floor. This was refractory to CIWA/Ativan protocol, later changed to phenobarbital protocol. Despite high doses of phenobarbital, patient had worsening agitation requiring response of hospital security to the bedside. Patient was transferred to ICU for further management of alcohol withdrawal.    For further details, please see assessment and plan. ROS: Review of Systems   Constitutional: Negative for chills, fatigue and fever. HENT: Negative for congestion, sinus pressure and sore throat. Eyes: Negative for redness and visual disturbance. Respiratory: Negative for cough, chest tightness and shortness of breath. Cardiovascular: Negative for chest pain and leg swelling. Gastrointestinal: Negative for abdominal distention, abdominal pain and nausea. Genitourinary: Negative for difficulty urinating and dysuria. Musculoskeletal: Negative for back pain and neck pain. Skin: Negative for color change and pallor. Neurological: Negative for tremors, seizures, syncope, numbness and headaches. Psychiatric/Behavioral: Positive for agitation and confusion. The patient is nervous/anxious and is hyperactive. Denies visual or auditory hallucinations     PMH:  Per HPI  SHX:  Former-smoker. Quit in 2020. 32 pack-year history. Positive for alcohol abuse; drinks about 1/5 liquor per day. Denies substance use. FHX: diabetes, stroke, HTN, heart disease. Allergies: latex, penicillins, zoloft. Medications:     dexmedetomidine 0.6 mcg/kg/hr (02/09/22 0050)    dilTIAZem (CARDIZEM) 125 mg in dextrose 5% 125 mL infusion 10 mg/hr (02/08/22 2201)    sodium chloride      dextrose        warfarin  7.5 mg Oral Once    PHENobarbital  260 mg IntraVENous Once    enoxaparin  1 mg/kg SubCUTAneous Q12H    bumetanide  2 mg IntraVENous Daily    PHENobarbital IVPB  4 mg/kg (Ideal) IntraVENous Q4H    Followed by   Jessie Rivera ON 2/9/2022] PHENobarbital  64.8 mg Oral BID    Followed by   Jessie Rivera ON 2/10/2022] PHENobarbital  32.4 mg Oral BID    metoprolol  5 mg IntraVENous O1Y    folic acid  1 mg IntraMUSCular Daily    thiamine  100 mg IntraMUSCular Daily    sodium chloride flush  5-40 mL IntraVENous 2 times per day    multivitamin  1 tablet Oral Daily    atorvastatin  80 mg Oral Nightly    [Held by provider] isosorbide mononitrate  120 mg Oral Daily    ticagrelor  90 mg Oral BID    insulin lispro  0-12 Units SubCUTAneous TID WC    insulin lispro  0-6 Units SubCUTAneous Nightly    [Held by provider] hydrALAZINE  100 mg Oral 3 times per day    [Held by provider] losartan  50 mg Oral Daily    nicotine  1 patch TransDERmal Q24H    insulin glargine  20 Units SubCUTAneous Nightly    warfarin placeholder: dosing by pharmacy   Other RX Placeholder       Vital Signs:   T: 98.5: P: 84 RR: 16 B/P: 128/90: FiO2: room air: O2 Sat:95%: I/O: 846/1975 (-1128) GCS: 13  Body mass index is 33.68 kg/m². [facility]:   Acute on chronically ill adult male. Appears stated age. HEENT:  normocephalic and atraumatic. No scleral icterus. PERR  Neck: supple. No Thyromegaly. Lungs: clear to auscultation. No retractions  Cardiac: RRR. No JVD. Abdomen: soft. Nontender. Extremities:  No clubbing, cyanosis, or edema x 4. Vasculature: capillary refill < 3 seconds. Palpable dorsalis pedis pulses.   Skin:  warm and dry.  Psych:  RASS -1. Excitable. Intermittently agitated. Confused. Affect appropriate  Lymph:  No supraclavicular adenopathy. Neurologic:  No focal deficit. No seizures. Follows commands. No tremor presently. Data: (All radiographs, tracings, PFTs, and imaging are personally viewed and interpreted unless otherwise noted).  Telemetry: Atrial fibrillation. Rate 84. No ectopy.  ECHO 2/8/22 report: Left ventricular size is mildly dilated and systolic function is severely reduced. Ejection fraction was estimated at 25-30%. LV wall thickness is within normal limits. The left atrium is moderately dilated. Device lead seen in the right heart.  Na 142 K 3.8 Cl 109 CO2 20 BUN 30 Cr 2.0 Gluc 168 Ca 7.8   WBC 11.1 Hgb 12.5 Hct 38.0 Plt 120   INR 1.02      Case discussed with Dr. Donnie Navarrete.     Electronically signed by ISABELLA Ramirez-BON

## 2022-02-09 NOTE — CARE COORDINATION
2/9/22, 4:38 PM EST    DISCHARGE ON GOING EVALUATION    Bee Arriaza day: 2  Location: [facility]18/018-A Reason for admit: Alcohol abuse [F10.10]  Atrial fibrillation with rapid ventricular response ([address] [I48.91]  Atrial fibrillation with RVR ([address] [I48.91]  AICD discharge [Z45.02]   Procedure: none  Barriers to Discharge: Transferred to ICU last evening d/t worsening agitation secondary to withdrawal symptoms. Started on precedex drip and phenobarbital.     Afebrile. 's. On room air. Sitter at bedside. Unable to follow commands; DOMINGUEZ x4. Slurred speech. PT/OT. Intensivist and Cardiology following. Addiction Services consulted. Telemetry, NG clamped. Precedex @ 1.4 mcg/kg/hr, cardizem @ 15 mg/hr, amio, lipitor, IV bumex 2 mg daily, lovenox bid, IM folic acid daily, prn IV hydralazine, hydralazine, lantus, SSI, prn IV ativan, cozaar, nicotine patch, phenobarbital bid, K+, brilinta, IV thiamine, coumadin - pharmacy dosing. K+ 3.4, BUN 26, Creat 2, hgb 12, plt 88. PCP: Riesa Bernheim, APRN - CNP  Readmission Risk Score: 20.3 ( )%  Patient Goals/Plan/Treatment Preferences: From home. Needs meet & greet. No family present and was reported that patient does not want his children knowing he is here unless it is an emergency. Plan pending clinical course.

## 2022-02-09 NOTE — PROGRESS NOTES
Clinical Pharmacy Note    Warfarin consult follow-up    Recent Labs     02/09/22  0515   INR 1.33*     Recent Labs     02/07/22  1313 02/08/22  0528 02/09/22  0515   HGB 12.2* 12.5* 12.0*   HCT 36.6* 38.0* 35.9*    120* 88*     Significant Drug-Drug Interactions:  New warfarin drug-drug interactions: enoxaparin, phenobarbital  Discontinued drug-drug interactions: none  Current warfarin drug-drug interactions: ticagrelor (HM), enoxaparin 1 mg/kg q12h, phenobarbital     Date INR Warfarin Dose   2/07/2022 1.05 7.5 mg   2/8/2022   1.02  not given by RN    2/9/2022  1.33  5 mg                                     Notes:                   PT/INR or POC-INR will be monitored routinely until therapeutic INR is achieved.     Maryellen Braun, PharmD, BCPS, BCCCP  2/9/2022 12:03 PM

## 2022-02-09 NOTE — PROGRESS NOTES
3611- Patient becoming more verbally aggressive and attempting to kick staff.   Orders to go up to 0.9 on Precedex per Zia Erickson NP.

## 2022-02-09 NOTE — PROGRESS NOTES
7850: Per Katrin Berrios RN patient is not appropriate for AOD consult at this time. MAAME to re-attempt.

## 2022-02-09 NOTE — PROGRESS NOTES
CRITICAL CARE PROGRESS NOTE      Patient:  Marcelle Diaz    Unit/Bed:4D-18/018-A  YOB: 1965  MRN: [id number]   PCP: ISABELLA Castellano CNP  Date of Admission: 2/7/2022  Chief Complaint:- ETOH withdrawal     Assessment and Plan:      Acute alcohol withdrawal: Recent alcohol binge noted. Patient positive for withdrawal symptoms. Patient receiving phenobarbital and required Precedex sedation and transferred to the ICU on 2/9. Monitor. Significant history of alcohol abuse. Heart failure with reduced ejection fraction: AICD placement, on chronic diuretic 2 mg Bumex daily. Cardiology consulted due to AICD firing prior to admission. Hypokalemia: Replacement ordered  Atrial fibrillation with RVR: Patient on Cardizem drip normally on Coumadin. Recent noncompliance patient is being bridged back with Lovenox. Mechanical fall: X-rays negative for acute injuries. Likely secondary to alcohol intoxication. PT OT when appropriate. Macrocytic anemia: Hemoglobin 12.0, hematocrit 35.9, no active signs of bleeding. Monitor. Thrombocytopenia: Platelets 88, monitor closely in the setting of use of Lovenox. No obvious signs of bleeding. Chronic kidney disease stage III: Creatinine at baseline. Monitor. CAD: LAD stent placed 10/19/2020, on Brilinta  Diabetes mellitus: Sliding scale insulin, Lantus. Hyperlipidemia: Lipitor 80 mg nightly  Hypertension: Resume home antihypertensives. Wean Cardizem as tolerated. Tobacco abuse: Nicotine patch    INITIAL H AND P AND ICU COURSE:  Jeni Espinoza is a 77-year-old white male who presented to [facility] on 2/7/2022 with complaints of chest pain after AICD fired. Has a past medical history of reformed smoker, alcohol abuse, heart failure, anxiety, atrial fibrillation, CAD, cardiomyopathy, CKD, diabetes mellitus, GERD, hyperlipidemia, hypertension, AICD placement.   Per report patient presented to [facility] after chest pain following his AICD firing. He reported that the device and fired 5 times at home on 2/7/2022. He reported that he had chest pain immediately following these events. He denies chest pain prior. He reports that this is happened to him in the past.  He states he has not been taking his medications for the last 5 to 6 days and had been drinking excessively. Patient states he had drank 1/5 of apple crown whiskey for the past 5 to 6 days. He was agitated upon presentation. He was initially admitted to the hospitalist service. His home medications were resumed. Cardiology was consulted for AICD firing. He developed worsening agitation secondary to withdrawal symptoms on 2/8. CIWA scale was initiated and phenobarbital Dennis protocol the patient continued to have increased symptoms. He was transferred to the ICU for further care. On 2/9 patient continues to require phenobarbital and Precedex. Patient continues to have withdrawal symptoms. Monitor for ability to wean sedatives. Monitor closely for need for intubation. Past Medical History:  Per HPI  Family History:  Mother: diabetes, HTN, stroke Father:   Social History: Reformed smoker, occasional alcohol use, denies drug use    ROS   Unable to review secondary to mental status    Scheduled Meds:   warfarin  7.5 mg Oral Once    PHENobarbital  260 mg IntraVENous Once    enoxaparin  1 mg/kg SubCUTAneous Q12H    bumetanide  2 mg IntraVENous Daily    PHENobarbital  64.8 mg Oral BID    Followed by    Desire Petty ON 2/10/2022] PHENobarbital  32.4 mg Oral BID    metoprolol  5 mg IntraVENous Y3W    folic acid  1 mg IntraMUSCular Daily    thiamine  100 mg IntraMUSCular Daily    sodium chloride flush  5-40 mL IntraVENous 2 times per day    multivitamin  1 tablet Oral Daily    atorvastatin  80 mg Oral Nightly    [Held by provider] isosorbide mononitrate  120 mg Oral Daily    ticagrelor  90 mg Oral BID    insulin lispro  0-12 Units SubCUTAneous TID     insulin lispro  0-6 Units SubCUTAneous Nightly    [Held by provider] hydrALAZINE  100 mg Oral 3 times per day    [Held by provider] losartan  50 mg Oral Daily    nicotine  1 patch TransDERmal Q24H    insulin glargine  20 Units SubCUTAneous Nightly    warfarin placeholder: dosing by pharmacy   Other RX Placeholder     Continuous Infusions:   dexmedetomidine 1.4 mcg/kg/hr (02/09/22 0507)    dilTIAZem (CARDIZEM) 125 mg in dextrose 5% 125 mL infusion 10 mg/hr (02/09/22 1598)    sodium chloride      dextrose         PHYSICAL EXAMINATION:  T:  98.0.  P:  99. RR:  17. B/P:  169/106. FiO2:  0. O2 Sat:  98.  I/O:  846/1975  Body mass index is 33.68 kg/m². General:   Acute on chronically ill-appearing white male  HEENT:  normocephalic and atraumatic. No scleral icterus. PERR  Neck: supple. No Thyromegaly. Lungs: clear to auscultation. No retractions  Cardiac: RRR. No JVD. Abdomen: soft. Nontender. Extremities:  No clubbing, cyanosis, or edema x 4. Vasculature: capillary refill < 3 seconds. Palpable dorsalis pedis pulses. Skin:  warm and dry. Psych: Awakes but follows no commands  Lymph:  No supraclavicular adenopathy. Neurologic:  No focal deficit. No seizures. Data: (All radiographs, tracings, PFTs, and imaging are personally viewed and interpreted unless otherwise noted). Sodium 144, potassium 3.4, chloride 111, CO2 23, BUN 26, creatinine 2.0, anion gap 10.0, glucose 141. WBC 6.9, hemoglobin 12.0, hematocrit 35.9, platelet count 88  Telemetry shows normal sinus rhythm  KUB 2/9/2022 reports NG tube in the stomach. Chest x-ray 2/7/2022 reports no acute process  Left knee x-ray 2/7/2022 reports no acute abnormality  Right radial ulnar x-ray 2/7/2022 reports no acute process  Right humerus x-ray 2/7/2022 reports no acute process  Renal ultrasound 1/26/2022 reports probable bilateral renal cyst.  Complex lesion of the left kidney malignancy cannot be excluded. .  EKG 2/7/2022 reports atrial fibrillation with rapid ventricular response  Echocardiogram 2/8/2022 reports ejection fraction 25 to 30% moderate dilation of left atrium          Meets Continued ICU Level Care Criteria:    [] Yes   [] No - Transfer Planned to listed location:  [] HOSPITALIST CONTACTED-      Case and plan discussed with Dr. Ashlie Jackson. Electronically signed by Collin Poole. ISABELLA Elizondo - [facility]  CRITICAL CARE SPECIALIST  Patient seen by me including key components of medical care. Case discussed with nurse practitioner. Patient with delirium tremens requiring IV phenobarbital and IV Precedex. Requiring Brilinta via NG tube due to suppressed mentation. When patient is awake, he is agitated and violent. .  Italicized font, if present,  represents changes to the note made by me. CC time 35 minutes. Time was discontiguous. Time does not include procedure. Time does include my direct assessment of the patient and coordination of care. Time represents more than 50% of the time involved with patient care by the [address] [place] [address] team.  Electronically signed by Mis Guido.  Ashlie Jackson MD.
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
## 2022-02-10 LAB
ANION GAP SERPL CALCULATED.3IONS-SCNC: 14 MEQ/L (ref 8–16)
BUN BLDV-MCNC: 29 MG/DL (ref 7–22)
CALCIUM SERPL-MCNC: 7.9 MG/DL (ref 8.5–10.5)
CHLORIDE BLD-SCNC: 109 MEQ/L (ref 98–111)
CO2: 20 MEQ/L (ref 23–33)
CREAT SERPL-MCNC: 2.2 MG/DL (ref 0.4–1.2)
ERYTHROCYTE [DISTWIDTH] IN BLOOD BY AUTOMATED COUNT: 13.9 % (ref 11.5–14.5)
ERYTHROCYTE [DISTWIDTH] IN BLOOD BY AUTOMATED COUNT: 50.5 FL (ref 35–45)
GFR SERPL CREATININE-BSD FRML MDRD: 31 ML/MIN/1.73M2
GLUCOSE BLD-MCNC: 101 MG/DL (ref 70–108)
GLUCOSE BLD-MCNC: 104 MG/DL (ref 70–108)
GLUCOSE BLD-MCNC: 129 MG/DL (ref 70–108)
GLUCOSE BLD-MCNC: 161 MG/DL (ref 70–108)
GLUCOSE BLD-MCNC: 97 MG/DL (ref 70–108)
HCT VFR BLD CALC: 39.6 % (ref 42–52)
HEMOGLOBIN: 12.8 GM/DL (ref 14–18)
INR BLD: 1.4 (ref 0.85–1.13)
MAGNESIUM: 1.9 MG/DL (ref 1.6–2.4)
MCH RBC QN AUTO: 32.9 PG (ref 26–33)
MCHC RBC AUTO-ENTMCNC: 32.3 GM/DL (ref 32.2–35.5)
MCV RBC AUTO: 101.8 FL (ref 80–94)
MRSA SCREEN: NORMAL
PLATELET # BLD: 87 THOU/MM3 (ref 130–400)
PMV BLD AUTO: 11 FL (ref 9.4–12.4)
POTASSIUM REFLEX MAGNESIUM: 3.3 MEQ/L (ref 3.5–5.2)
POTASSIUM SERPL-SCNC: 3.2 MEQ/L (ref 3.5–5.2)
RBC # BLD: 3.89 MILL/MM3 (ref 4.7–6.1)
SCAN OF BLOOD SMEAR: NORMAL
SODIUM BLD-SCNC: 143 MEQ/L (ref 135–145)
WBC # BLD: 8.4 THOU/MM3 (ref 4.8–10.8)

## 2022-02-10 PROCEDURE — 84132 ASSAY OF SERUM POTASSIUM: CPT

## 2022-02-10 PROCEDURE — 36415 COLL VENOUS BLD VENIPUNCTURE: CPT

## 2022-02-10 PROCEDURE — 2500000003 HC RX 250 WO HCPCS: Performed by: STUDENT IN AN ORGANIZED HEALTH CARE EDUCATION/TRAINING PROGRAM

## 2022-02-10 PROCEDURE — 2580000003 HC RX 258: Performed by: NURSE PRACTITIONER

## 2022-02-10 PROCEDURE — 6370000000 HC RX 637 (ALT 250 FOR IP): Performed by: NURSE PRACTITIONER

## 2022-02-10 PROCEDURE — 6360000002 HC RX W HCPCS: Performed by: NURSE PRACTITIONER

## 2022-02-10 PROCEDURE — 2000000000 HC ICU R&B

## 2022-02-10 PROCEDURE — 2580000003 HC RX 258: Performed by: PHYSICIAN ASSISTANT

## 2022-02-10 PROCEDURE — 2500000003 HC RX 250 WO HCPCS: Performed by: NURSE PRACTITIONER

## 2022-02-10 PROCEDURE — 6370000000 HC RX 637 (ALT 250 FOR IP): Performed by: FAMILY MEDICINE

## 2022-02-10 PROCEDURE — 6360000002 HC RX W HCPCS: Performed by: PHYSICIAN ASSISTANT

## 2022-02-10 PROCEDURE — 82948 REAGENT STRIP/BLOOD GLUCOSE: CPT

## 2022-02-10 PROCEDURE — 6370000000 HC RX 637 (ALT 250 FOR IP): Performed by: PHYSICIAN ASSISTANT

## 2022-02-10 PROCEDURE — 2500000003 HC RX 250 WO HCPCS: Performed by: PHYSICIAN ASSISTANT

## 2022-02-10 PROCEDURE — 99232 SBSQ HOSP IP/OBS MODERATE 35: CPT | Performed by: PHYSICIAN ASSISTANT

## 2022-02-10 PROCEDURE — 83735 ASSAY OF MAGNESIUM: CPT

## 2022-02-10 PROCEDURE — 85027 COMPLETE CBC AUTOMATED: CPT

## 2022-02-10 PROCEDURE — 99291 CRITICAL CARE FIRST HOUR: CPT | Performed by: INTERNAL MEDICINE

## 2022-02-10 PROCEDURE — 80048 BASIC METABOLIC PNL TOTAL CA: CPT

## 2022-02-10 PROCEDURE — 85610 PROTHROMBIN TIME: CPT

## 2022-02-10 PROCEDURE — 6360000002 HC RX W HCPCS: Performed by: STUDENT IN AN ORGANIZED HEALTH CARE EDUCATION/TRAINING PROGRAM

## 2022-02-10 RX ORDER — POTASSIUM CHLORIDE 7.45 MG/ML
10 INJECTION INTRAVENOUS PRN
Status: DISCONTINUED | OUTPATIENT
Start: 2022-02-10 | End: 2022-02-17 | Stop reason: HOSPADM

## 2022-02-10 RX ORDER — METOPROLOL TARTRATE 50 MG/1
50 TABLET, FILM COATED ORAL 2 TIMES DAILY
Status: DISCONTINUED | OUTPATIENT
Start: 2022-02-10 | End: 2022-02-11

## 2022-02-10 RX ORDER — MAGNESIUM SULFATE 1 G/100ML
1000 INJECTION INTRAVENOUS ONCE
Status: COMPLETED | OUTPATIENT
Start: 2022-02-10 | End: 2022-02-10

## 2022-02-10 RX ORDER — POTASSIUM CHLORIDE 20 MEQ/1
60 TABLET, EXTENDED RELEASE ORAL ONCE
Status: DISCONTINUED | OUTPATIENT
Start: 2022-02-10 | End: 2022-02-10

## 2022-02-10 RX ORDER — WARFARIN SODIUM 3 MG/1
6 TABLET ORAL
Status: COMPLETED | OUTPATIENT
Start: 2022-02-10 | End: 2022-02-10

## 2022-02-10 RX ADMIN — MAGNESIUM SULFATE HEPTAHYDRATE 1000 MG: 1 INJECTION, SOLUTION INTRAVENOUS at 12:28

## 2022-02-10 RX ADMIN — TICAGRELOR 90 MG: 90 TABLET ORAL at 08:48

## 2022-02-10 RX ADMIN — FOLIC ACID 1 MG: 5 INJECTION, SOLUTION INTRAMUSCULAR; INTRAVENOUS; SUBCUTANEOUS at 08:48

## 2022-02-10 RX ADMIN — PHENOBARBITAL 32.4 MG: 32.4 TABLET ORAL at 08:48

## 2022-02-10 RX ADMIN — HYDRALAZINE HYDROCHLORIDE 100 MG: 50 TABLET, FILM COATED ORAL at 13:02

## 2022-02-10 RX ADMIN — DEXMEDETOMIDINE HYDROCHLORIDE 1.4 MCG/KG/HR: 4 INJECTION, SOLUTION INTRAVENOUS at 02:25

## 2022-02-10 RX ADMIN — DEXMEDETOMIDINE HYDROCHLORIDE 1.4 MCG/KG/HR: 4 INJECTION, SOLUTION INTRAVENOUS at 14:19

## 2022-02-10 RX ADMIN — ATORVASTATIN CALCIUM 80 MG: 80 TABLET, FILM COATED ORAL at 20:52

## 2022-02-10 RX ADMIN — METOPROLOL TARTRATE 25 MG: 25 TABLET, FILM COATED ORAL at 11:55

## 2022-02-10 RX ADMIN — ENOXAPARIN SODIUM 120 MG: 150 INJECTION SUBCUTANEOUS at 00:30

## 2022-02-10 RX ADMIN — PHENOBARBITAL SODIUM 130 MG: 65 INJECTION INTRAMUSCULAR at 15:18

## 2022-02-10 RX ADMIN — METOPROLOL TARTRATE 50 MG: 50 TABLET, FILM COATED ORAL at 20:52

## 2022-02-10 RX ADMIN — DILTIAZEM HYDROCHLORIDE 10 MG/HR: 5 INJECTION INTRAVENOUS at 03:12

## 2022-02-10 RX ADMIN — LORAZEPAM 2 MG: 2 INJECTION INTRAMUSCULAR; INTRAVENOUS at 11:23

## 2022-02-10 RX ADMIN — DEXMEDETOMIDINE HYDROCHLORIDE 1.4 MCG/KG/HR: 4 INJECTION, SOLUTION INTRAVENOUS at 17:27

## 2022-02-10 RX ADMIN — METOPROLOL TARTRATE 25 MG: 25 TABLET, FILM COATED ORAL at 08:47

## 2022-02-10 RX ADMIN — INSULIN LISPRO 2 UNITS: 100 INJECTION, SOLUTION INTRAVENOUS; SUBCUTANEOUS at 17:37

## 2022-02-10 RX ADMIN — DEXMEDETOMIDINE HYDROCHLORIDE 1.4 MCG/KG/HR: 4 INJECTION, SOLUTION INTRAVENOUS at 05:04

## 2022-02-10 RX ADMIN — AMIODARONE HYDROCHLORIDE 200 MG: 200 TABLET ORAL at 08:47

## 2022-02-10 RX ADMIN — SODIUM CHLORIDE, PRESERVATIVE FREE 10 ML: 5 INJECTION INTRAVENOUS at 21:01

## 2022-02-10 RX ADMIN — BUMETANIDE 2 MG: 0.25 INJECTION INTRAMUSCULAR; INTRAVENOUS at 08:49

## 2022-02-10 RX ADMIN — DEXMEDETOMIDINE HYDROCHLORIDE 1.4 MCG/KG/HR: 4 INJECTION, SOLUTION INTRAVENOUS at 21:59

## 2022-02-10 RX ADMIN — DEXMEDETOMIDINE HYDROCHLORIDE 1.2 MCG/KG/HR: 4 INJECTION, SOLUTION INTRAVENOUS at 12:15

## 2022-02-10 RX ADMIN — ENOXAPARIN SODIUM 120 MG: 150 INJECTION SUBCUTANEOUS at 11:54

## 2022-02-10 RX ADMIN — POTASSIUM BICARBONATE 20 MEQ: 782 TABLET, EFFERVESCENT ORAL at 08:47

## 2022-02-10 RX ADMIN — DEXMEDETOMIDINE HYDROCHLORIDE 1.4 MCG/KG/HR: 4 INJECTION, SOLUTION INTRAVENOUS at 19:34

## 2022-02-10 RX ADMIN — PHENOBARBITAL 32.4 MG: 32.4 TABLET ORAL at 20:50

## 2022-02-10 RX ADMIN — POTASSIUM BICARBONATE 60 MEQ: 782 TABLET, EFFERVESCENT ORAL at 11:55

## 2022-02-10 RX ADMIN — INSULIN GLARGINE 20 UNITS: 100 INJECTION, SOLUTION SUBCUTANEOUS at 20:54

## 2022-02-10 RX ADMIN — DEXMEDETOMIDINE HYDROCHLORIDE 1.1 MCG/KG/HR: 4 INJECTION, SOLUTION INTRAVENOUS at 08:46

## 2022-02-10 RX ADMIN — THIAMINE HYDROCHLORIDE 200 MG: 100 INJECTION, SOLUTION INTRAMUSCULAR; INTRAVENOUS at 09:12

## 2022-02-10 RX ADMIN — HYDRALAZINE HYDROCHLORIDE 100 MG: 50 TABLET, FILM COATED ORAL at 05:06

## 2022-02-10 RX ADMIN — PHENOBARBITAL SODIUM 260 MG: 65 INJECTION INTRAMUSCULAR at 13:27

## 2022-02-10 RX ADMIN — LOSARTAN POTASSIUM 50 MG: 50 TABLET, FILM COATED ORAL at 12:03

## 2022-02-10 RX ADMIN — LORAZEPAM 2 MG: 2 INJECTION INTRAMUSCULAR; INTRAVENOUS at 18:40

## 2022-02-10 RX ADMIN — THIAMINE HYDROCHLORIDE 200 MG: 100 INJECTION, SOLUTION INTRAMUSCULAR; INTRAVENOUS at 21:00

## 2022-02-10 RX ADMIN — WARFARIN SODIUM 6 MG: 3 TABLET ORAL at 17:40

## 2022-02-10 RX ADMIN — HYDRALAZINE HYDROCHLORIDE 100 MG: 50 TABLET, FILM COATED ORAL at 22:05

## 2022-02-10 RX ADMIN — TICAGRELOR 90 MG: 90 TABLET ORAL at 20:50

## 2022-02-10 ASSESSMENT — PAIN SCALES - WONG BAKER
WONGBAKER_NUMERICALRESPONSE: 0
WONGBAKER_NUMERICALRESPONSE: 0

## 2022-02-10 ASSESSMENT — PAIN SCALES - GENERAL: PAINLEVEL_OUTOF10: 0

## 2022-02-10 NOTE — CARE COORDINATION
2/10/22, 5:53 PM EST    DISCHARGE ON GOING EVALUATION    [facility] Sample day: 3  Location: [facility]18/018-A Reason for admit: Alcohol abuse [F10.10]  Atrial fibrillation with rapid ventricular response ([address] [I48.91]  Atrial fibrillation with RVR ([facility] [I48.91]  AICD discharge [Z45.02]   Procedure: none  Barriers to Discharge: Still agitated and intermittently aggressive; phenobarbital increased. PT/OT signed off; will need new orders when appropriate. Tmax 100.4. Afib 90's. On room air. Sitter at bedside. Oriented to person and place. Follows commands. Slurred speech. Intensivist and Cardiology following. Addiction Services consulted. Telemetry, NG clamped, external urinary catheter. Precedex @ 1.4 mcg/kg/hr, amio, lipitor, IV bumex 2 mg daily, lovenox bid, IM folic acid daily, prn IV hydralazine, hydralazine, lantus, SSI, prn IV ativan, cozaar, lopressor, nicotine patch, phenobarbital bid, K+, brilinta, IV thiamine, coumadin - pharmacy dosing, Electrolyte replacement protocols. K+ 3.3, BUN 29, Creat 2.2, hgb 12.8, plt 87, INR 1.4. PCP: ISABELLA Cheema - CNP  Readmission Risk Score: 19.9 ( )%  Patient Goals/Plan/Treatment Preferences: From home. Needs meet & greet. No family present and was reported that patient does not want his children knowing he is here unless it is an emergency. Plan pending clinical course.

## 2022-02-10 NOTE — PROGRESS NOTES
Clinical Pharmacy Note    Warfarin consult follow-up    Recent Labs     02/10/22  0522   INR 1.40*     Recent Labs     02/08/22  0528 02/09/22  0515 02/10/22  0522   HGB 12.5* 12.0* 12.8*   HCT 38.0* 35.9* 39.6*   * 88* 87*     Significant Drug-Drug Interactions:  New warfarin drug-drug interactions: Amiodarone (resumed HM)  Discontinued drug-drug interactions: none   Current warfarin drug-drug interactions: ticagrelor (HM), enoxaparin 1 mg/kg     q12h, phenobarbital, amiodarone ()     Date INR Warfarin Dose   2/07/2022 1.05 7.5 mg   2/8/2022  1.02  not given by RN    2/9/2022 1.33 5 mg   2/10/2022 1.40 6 mg                             Notes:                   PT/INR or POC-INR will be monitored routinely until therapeutic INR is achieved.     Anibal Gramajo, PharmD  2/10/2022 11:19 AM

## 2022-02-10 NOTE — PLAN OF CARE
Problem: Falls - Risk of:  Goal: Will remain free from falls  Description: Will remain free from falls  Outcome: Ongoing  Note: Patient at risk for falls due to decreased mobility/confusion. Fall assessment completed. Patient unable to use call light for needs this shift. Fall band in place on patient's arm. Fall signs posted. Bed alarm in place and functioning properly. Goal: Absence of physical injury  Description: Absence of physical injury  Outcome: Ongoing  Note: Patient free from injury this shift. Will continue to monitor. Problem: VIOLENT RESTRAINTS  Goal: Removal from restraints as soon as assessed to be safe  Outcome: Ongoing  Note: Bilateral wrist restraints continue this shift for patient's safety due to patient intermittently making attempts to pull at lines and tubes this shift. Problem: Non-Violent Restraints  Goal: Removal from restraints as soon as assessed to be safe  Outcome: Ongoing  Note: Bilateral wrist restraints continue this shift for patient's safety due to patient intermittently making attempts to pull at lines and tubes this shift. Problem: Skin Integrity:  Goal: Will show no infection signs and symptoms  Description: Will show no infection signs and symptoms  Outcome: Ongoing  Note: Patient at risk for decreased skin integrity due to decreased mobility. Patient turned and repositioned every 2 hours and PRN t/o this shift. No new skin breakdown noted this shift. Goal: Absence of new skin breakdown  Description: Absence of new skin breakdown  Outcome: Ongoing  Note: Patient at risk for decreased skin integrity due to decreased mobility. Patient turned and repositioned every 2 hours and PRN t/o this shift. No new skin breakdown noted this shift. Care plan reviewed with patient. Patient unable verbalize understanding of the plan of care and contribute to goal setting.

## 2022-02-10 NOTE — FLOWSHEET NOTE
Pt was with a sitter but was asleep. He was dealing with A-Fib issues. He was blessed.       02/10/22 1437   Encounter Summary   Services provided to: Patient   Referral/Consult From: Rounding   Continue Visiting Yes  (2/10)   Complexity of Encounter Low   Length of Encounter 15 minutes   Routine   Type Initial   Assessment Sleeping   Intervention [place]

## 2022-02-10 NOTE — PROGRESS NOTES
Cardiology Progress Note      Patient:  Estela Allen  YOB: 1965  MRN: [id number]   Acct: [de-identified]  Admit Date:  2/7/2022  Primary Cardiologist: Nicolle Moya MD    Note per dr Vianca Chapman for Consultation:  ICD shock        History Of Present Illness:    64 y.o. male CAD status post PCI, severe LV dysfunction status post ICD, atrial fibrillation, alcohol abuse, diabetes, hypertension, hyperlipidemia who presented to the hospital after ICD shock. The ICD shocked him 6 times on 2/7/2022. Patient is not oriented currently in ICU most of the history was obtained from the chart. Per history patient has been drinking excessively at home. In the hospital CIWA scale was initiated in phenobarbital protocol was started. Cardiology was consulted for ICD shocks. Interrogation of the device showed atrial fibrillation with RVR with heart rates in excess of 200 bpm.  No ventricular tachycardia or fibrillation. Echocardiogram on 2/8/2022 showed ejection fraction of 25 to 30% with moderately dilated LA. Creatinine is 2.0, potassium is 3.4, H&H is 12/36\"    Subjective (Events in last 24 hours): pt sleeping, not waking up for discussion, woke up earlier and followed commands for nurse. NAD.  No reported cp  On RA  On cardizem gtt at 2.5 mg/hr  On precedex at 1    Net I/o -1.6 L    Objective:   /77   Pulse 80   Temp 97.8 °F (36.6 °C) (Axillary)   Resp 14   Ht 6' 2\" (1.88 m)   Wt 262 lb 5.6 oz (119 kg)   SpO2 99%   BMI 33.68 kg/m²        TELEMETRY: afib cvr 80s    Physical Exam:  General Appearance: sleeping, not waking up for conversation  Cardiovascular:irregularly irregular  Pulmonary/Chest: clear to auscultation bilaterally- no wheezes, rales or rhonchi, normal air movement, no respiratory distress  Abdomen: soft, non-tender, non-distended, normal bowel sounds, no masses   Extremities: no cyanosis, clubbing or edema, pulse   Skin: warm and dry  Head: normocephalic and atraumatic  Eyes: pupils equal, round, and reactive to light  Neck: supple and non-tender without mass, no thyromegaly     Medications:    metoprolol tartrate  25 mg Oral BID    potassium bicarb-citric acid  20 mEq Oral Daily    amiodarone  200 mg Oral Daily    thiamine (VITAMIN B1) IVPB  200 mg IntraVENous BID    PHENobarbital  260 mg IntraVENous Once    enoxaparin  1 mg/kg SubCUTAneous Q12H    bumetanide  2 mg IntraVENous Daily    PHENobarbital  32.4 mg Oral BID    folic acid  1 mg IntraMUSCular Daily    sodium chloride flush  5-40 mL IntraVENous 2 times per day    multivitamin  1 tablet Oral Daily    atorvastatin  80 mg Oral Nightly    [Held by provider] isosorbide mononitrate  120 mg Oral Daily    ticagrelor  90 mg Oral BID    insulin lispro  0-12 Units SubCUTAneous TID WC    insulin lispro  0-6 Units SubCUTAneous Nightly    hydrALAZINE  100 mg Oral 3 times per day    losartan  50 mg Oral Daily    nicotine  1 patch TransDERmal Q24H    insulin glargine  20 Units SubCUTAneous Nightly    warfarin placeholder: dosing by pharmacy   Other RX Placeholder      dexmedetomidine 1 mcg/kg/hr (02/10/22 0908)    dilTIAZem (CARDIZEM) 125 mg in dextrose 5% 125 mL infusion 2.5 mg/hr (02/10/22 0908)    sodium chloride      dextrose       potassium chloride, 10 mEq, PRN  LORazepam, 2 mg, Q4H PRN  hydrALAZINE, 10 mg, Q6H PRN  sodium chloride flush, 5-40 mL, PRN  sodium chloride, 25 mL, PRN  ondansetron, 4 mg, Q8H PRN   Or  ondansetron, 4 mg, Q6H PRN  polyethylene glycol, 17 g, Daily PRN  acetaminophen, 650 mg, Q6H PRN   Or  acetaminophen, 650 mg, Q6H PRN  nitroGLYCERIN, 0.4 mg, Q5 Min PRN  glucose, 15 g, PRN  glucagon (rDNA), 1 mg, PRN  dextrose, 100 mL/hr, PRN  dextrose bolus (hypoglycemia), 125 mL, PRN   Or  dextrose bolus (hypoglycemia), 250 mL, PRN        Diagnostics:  TTE 2/8/22  Summary   Technically difficult examination. Left ventricular size is mildly dilated and systolic function is severely   reduced.  Ejection fraction was estimated at 25-30%. LV wall thickness is   within normal limits. The left atrium is moderately dilated. Device lead seen in the right heart. Signature      ----------------------------------------------------------------   Electronically signed by Fatou Aguirre MD (Interpreting   physician) on 02/08/2022 at 12:42     Lab Data:    Cardiac Enzymes:  No results for input(s): CKTOTAL, CKMB, CKMBINDEX, TROPONINI in the last 72 hours.     CBC:   Lab Results   Component Value Date    WBC 8.4 02/10/2022    RBC 3.89 02/10/2022    RBC 4.75 11/03/2011    HGB 12.8 02/10/2022    HCT 39.6 02/10/2022    PLT 87 02/10/2022       CMP:    Lab Results   Component Value Date     02/10/2022    K 3.3 02/10/2022    K 3.2 02/10/2022     02/10/2022    CO2 20 02/10/2022    BUN 29 02/10/2022    CREATININE 2.2 02/10/2022    LABGLOM 31 02/10/2022    GLUCOSE 97 02/10/2022    CALCIUM 7.9 02/10/2022       Hepatic Function Panel:    Lab Results   Component Value Date    ALKPHOS 99 02/08/2022    ALT 20 02/08/2022    AST 30 02/08/2022    PROT 5.8 02/08/2022    BILITOT 0.9 02/08/2022    BILIDIR <0.2 02/07/2022    LABALBU 3.2 02/08/2022       Magnesium:    Lab Results   Component Value Date    MG 1.9 02/10/2022       PT/INR:    Lab Results   Component Value Date    INR 1.40 02/10/2022       HgBA1c:    Lab Results   Component Value Date    LABA1C 7.0 02/07/2022       FLP:    Lab Results   Component Value Date    TRIG 134 11/12/2021    HDL 48 11/12/2021    LDLCALC 73 11/12/2021       TSH:    Lab Results   Component Value Date    TSH 5.750 02/07/2022         Assessment:    AICD shocks - due to afib rvr, no ventricular arrhythmia on interrogation   Chronic afib cvr    on coumadin PTA  CAD, prior PCI, hx CABG  Severe CMP - ef 25-30 per TTE 2/8/22, ef 30-35 per TTE 10/2020  Hx AICD  HTN  DM  dyslipidemia  ETOH abuse/withdrawal - on protocol per icu team  CKD    Plan:    Keep mag >2 and K >4, replace prn  Cont brilinta/statin/BB  Change lopressor to 50 bid  Stop cardizem gtt  Consider nephrology consult - ok to resume arb?      Electronically signed by Wilder Cooper PA-C on 2/10/2022 at 10:00 AM

## 2022-02-10 NOTE — PLAN OF CARE
Problem: Non-Violent Restraints  Goal: Removal from restraints as soon as assessed to be safe  2/10/2022 0319 by Ru Lopez RN  Outcome: Not Met This Shift  2/9/2022 1428 by Susie Garces RN  Outcome: Ongoing  Note: Unable to remove restraints at this time. Pt continues to be combative and verbally and physically aggressive towards staff.      Problem: Non-Violent Restraints  Goal: No harm/injury to patient while restraints in use  2/10/2022 0319 by Ru Lopez RN  Outcome: Completed  2/9/2022 1428 by Susie Garces RN  Outcome: Ongoing     Problem: Non-Violent Restraints  Goal: Patient's dignity will be maintained  2/10/2022 0319 by Ru Lopez RN  Outcome: Completed  2/9/2022 1428 by Susie Garces RN  Outcome: Ongoing

## 2022-02-10 NOTE — PROGRESS NOTES
Reviewed [facility] for AOD consult. Per Epic, \"Unable to follow commands; ANGELICA x4. Slurred speech\", unable to complete.

## 2022-02-10 NOTE — PROGRESS NOTES
CRITICAL CARE PROGRESS NOTE      Patient:  Donaldo Mt    Unit/Bed:4D-18/018-A  YOB: 1965  MRN: [id number]   PCP: Riesa Bernheim, APRN - CNP  Date of Admission: 2/7/2022  Chief Complaint:- ETOH withdrawal     Assessment and Plan:      Acute alcohol withdrawal: Recent alcohol binge noted. Patient positive for withdrawal symptoms. Patient receiving phenobarbital and required Precedex sedation and transferred to the ICU on 2/9. Monitor. Significant history of alcohol abuse. Encephalopathy: Secondary to alcohol withdrawal.  Continue phenobarbital and Precedex. Heart failure with reduced ejection fraction: AICD placement, on chronic diuretic 2 mg Bumex daily. Cardiology consulted due to AICD firing prior to admission. Hypokalemia: Replacement ordered  Atrial fibrillation with RVR: Patient on Cardizem drip normally on Coumadin. Recent noncompliance patient is being bridged back with Lovenox. Add lopressor 2/10  Mechanical fall: X-rays negative for acute injuries. Likely secondary to alcohol intoxication. PT OT when appropriate. Macrocytic anemia: Hemoglobin 12.8, hematocrit 39.6, no active signs of bleeding. Monitor. Thrombocytopenia: Platelets 87, monitor closely in the setting of use of Lovenox. No obvious signs of bleeding. Chronic kidney disease stage III: Creatinine at baseline. Monitor. CAD: LAD stent placed 10/19/2020, on Brilinta  Diabetes mellitus: Sliding scale insulin, Lantus. Hyperlipidemia: Lipitor 80 mg nightly  Hypertension: Resume home antihypertensives. Wean Cardizem as tolerated. Tobacco abuse: Nicotine patch     INITIAL H AND P AND ICU COURSE:  Aakash Ballard is a 80-year-old white male who presented to [facility] on 2/7/2022 with complaints of chest pain after AICD fired.   Has a past medical history of reformed smoker, alcohol abuse, heart failure, anxiety, atrial fibrillation, CAD, cardiomyopathy, CKD, diabetes mellitus, GERD, hyperlipidemia, hypertension, AICD placement. Per report patient presented to [facility] after chest pain following his AICD firing. He reported that the device and fired 5 times at home on 2/7/2022. He reported that he had chest pain immediately following these events. He denies chest pain prior. He reports that this is happened to him in the past.  He states he has not been taking his medications for the last 5 to 6 days and had been drinking excessively. Patient states he had drank 1/5 of apple crown whiskey for the past 5 to 6 days. He was agitated upon presentation. He was initially admitted to the hospitalist service. His home medications were resumed. Cardiology was consulted for AICD firing. He developed worsening agitation secondary to withdrawal symptoms on 2/8. CIWA scale was initiated and phenobarbital Dennis protocol the patient continued to have increased symptoms. He was transferred to the ICU for further care. On 2/9 patient continues to require phenobarbital and Precedex. Patient continues to have withdrawal symptoms. Monitor for ability to wean sedatives. Monitor closely for need for intubation. Past Medical History:  Per HPI  Family History:  Mother: diabetes, HTN, stroke Father:   Social History: Reformed smoker, occasional alcohol use, denies drug use     ROS   Unable to review secondary to mental status    Scheduled Meds:   metoprolol tartrate  50 mg Oral BID    warfarin  6 mg Oral Once    PHENobarbital IVPB  260 mg IntraVENous Once    potassium bicarb-citric acid  20 mEq Oral Daily    amiodarone  200 mg Oral Daily    thiamine (VITAMIN B1) IVPB  200 mg IntraVENous BID    PHENobarbital  260 mg IntraVENous Once    enoxaparin  1 mg/kg SubCUTAneous Q12H    bumetanide  2 mg IntraVENous Daily    PHENobarbital  32.4 mg Oral BID    folic acid  1 mg IntraMUSCular Daily    sodium chloride flush  5-40 mL IntraVENous 2 times per day    multivitamin  1 tablet Oral Daily    atorvastatin excluded. .  EKG 2/7/2022 reports atrial fibrillation with rapid ventricular response  Echocardiogram 2/8/2022 reports ejection fraction 25 to 30% moderate dilation of left atrium      Meets Continued ICU Level Care Criteria:    [x] Yes   [] No - Transfer Planned to listed location:  [] HOSPITALIST CONTACTED-      Case and plan discussed with Dr. Chante Arteaga. Electronically signed by Toshia Abernathy. ISABELLA Levine - [facility]  CRITICAL CARE SPECIALIST  Patient seen by me including key components of medical care. Case discussed with nurse practitioner. Patient requiring Precedex. Patient requiring phenobarbital.  Still agitated and intermittently aggressive. Increase phenobarbital dosing. Italicized font, if present,  represents changes to the note made by me. CC time 35 minutes. Time was discontiguous. Time does not include procedure. Time does include my direct assessment of the patient and coordination of care. Time represents more than 50% of the time involved with patient care by the [address] [place] [address] team.  Electronically signed by Gabby Sanches.  Chante Arteaga MD.

## 2022-02-10 NOTE — PROGRESS NOTES
[facility] [employer] THERAPY MISSED TREATMENT NOTE  [facility] ICU 4D  4D-18/018-A      Date: 2/10/2022  Patient Name: Harvey Obrien        CSN: [id number]   : 1965  (64 y.o.)  Gender: male   Referring Practitioner: Kayleigh Wen PA-C  Diagnosis: Alcohol Abuse         REASON FOR MISSED TREATMENT:   pt on meds to sedate due to agitation and not appropriate for therapy. Plan to discontinue OT orders & await new when appropriate.

## 2022-02-11 LAB
ANION GAP SERPL CALCULATED.3IONS-SCNC: 16 MEQ/L (ref 8–16)
BUN BLDV-MCNC: 33 MG/DL (ref 7–22)
CALCIUM SERPL-MCNC: 7.9 MG/DL (ref 8.5–10.5)
CHLORIDE BLD-SCNC: 112 MEQ/L (ref 98–111)
CO2: 18 MEQ/L (ref 23–33)
CREAT SERPL-MCNC: 2.3 MG/DL (ref 0.4–1.2)
ERYTHROCYTE [DISTWIDTH] IN BLOOD BY AUTOMATED COUNT: 14.2 % (ref 11.5–14.5)
ERYTHROCYTE [DISTWIDTH] IN BLOOD BY AUTOMATED COUNT: 51 FL (ref 35–45)
GFR SERPL CREATININE-BSD FRML MDRD: 29 ML/MIN/1.73M2
GLUCOSE BLD-MCNC: 102 MG/DL (ref 70–108)
GLUCOSE BLD-MCNC: 109 MG/DL (ref 70–108)
GLUCOSE BLD-MCNC: 110 MG/DL (ref 70–108)
GLUCOSE BLD-MCNC: 112 MG/DL (ref 70–108)
GLUCOSE BLD-MCNC: 118 MG/DL (ref 70–108)
HCT VFR BLD CALC: 40.7 % (ref 42–52)
HEMOGLOBIN: 13.4 GM/DL (ref 14–18)
INR BLD: 1.86 (ref 0.85–1.13)
MAGNESIUM: 2.2 MG/DL (ref 1.6–2.4)
MCH RBC QN AUTO: 33.3 PG (ref 26–33)
MCHC RBC AUTO-ENTMCNC: 32.9 GM/DL (ref 32.2–35.5)
MCV RBC AUTO: 101.2 FL (ref 80–94)
PLATELET # BLD: 86 THOU/MM3 (ref 130–400)
PMV BLD AUTO: 10.1 FL (ref 9.4–12.4)
POTASSIUM SERPL-SCNC: 3.6 MEQ/L (ref 3.5–5.2)
RBC # BLD: 4.02 MILL/MM3 (ref 4.7–6.1)
SODIUM BLD-SCNC: 146 MEQ/L (ref 135–145)
WBC # BLD: 10.4 THOU/MM3 (ref 4.8–10.8)

## 2022-02-11 PROCEDURE — 36415 COLL VENOUS BLD VENIPUNCTURE: CPT

## 2022-02-11 PROCEDURE — 6360000002 HC RX W HCPCS: Performed by: NURSE PRACTITIONER

## 2022-02-11 PROCEDURE — 6370000000 HC RX 637 (ALT 250 FOR IP): Performed by: PHYSICIAN ASSISTANT

## 2022-02-11 PROCEDURE — 85027 COMPLETE CBC AUTOMATED: CPT

## 2022-02-11 PROCEDURE — 83735 ASSAY OF MAGNESIUM: CPT

## 2022-02-11 PROCEDURE — 2500000003 HC RX 250 WO HCPCS: Performed by: STUDENT IN AN ORGANIZED HEALTH CARE EDUCATION/TRAINING PROGRAM

## 2022-02-11 PROCEDURE — 6360000002 HC RX W HCPCS: Performed by: STUDENT IN AN ORGANIZED HEALTH CARE EDUCATION/TRAINING PROGRAM

## 2022-02-11 PROCEDURE — APPSS180 APP SPLIT SHARED TIME > 60 MINUTES: Performed by: NURSE PRACTITIONER

## 2022-02-11 PROCEDURE — 85610 PROTHROMBIN TIME: CPT

## 2022-02-11 PROCEDURE — 6370000000 HC RX 637 (ALT 250 FOR IP): Performed by: NURSE PRACTITIONER

## 2022-02-11 PROCEDURE — 6370000000 HC RX 637 (ALT 250 FOR IP): Performed by: FAMILY MEDICINE

## 2022-02-11 PROCEDURE — 99232 SBSQ HOSP IP/OBS MODERATE 35: CPT | Performed by: SURGERY

## 2022-02-11 PROCEDURE — 99232 SBSQ HOSP IP/OBS MODERATE 35: CPT | Performed by: NURSE PRACTITIONER

## 2022-02-11 PROCEDURE — 2580000003 HC RX 258: Performed by: NURSE PRACTITIONER

## 2022-02-11 PROCEDURE — 82948 REAGENT STRIP/BLOOD GLUCOSE: CPT

## 2022-02-11 PROCEDURE — 80048 BASIC METABOLIC PNL TOTAL CA: CPT

## 2022-02-11 PROCEDURE — 2500000003 HC RX 250 WO HCPCS: Performed by: NURSE PRACTITIONER

## 2022-02-11 PROCEDURE — 6370000000 HC RX 637 (ALT 250 FOR IP)

## 2022-02-11 PROCEDURE — 2000000000 HC ICU R&B

## 2022-02-11 PROCEDURE — 2580000003 HC RX 258: Performed by: PHYSICIAN ASSISTANT

## 2022-02-11 RX ORDER — WARFARIN SODIUM 4 MG/1
4 TABLET ORAL
Status: COMPLETED | OUTPATIENT
Start: 2022-02-11 | End: 2022-02-11

## 2022-02-11 RX ADMIN — WARFARIN SODIUM 4 MG: 4 TABLET ORAL at 17:20

## 2022-02-11 RX ADMIN — ATORVASTATIN CALCIUM 80 MG: 80 TABLET, FILM COATED ORAL at 21:13

## 2022-02-11 RX ADMIN — SODIUM CHLORIDE, PRESERVATIVE FREE 10 ML: 5 INJECTION INTRAVENOUS at 08:56

## 2022-02-11 RX ADMIN — PHENOBARBITAL 32.4 MG: 32.4 TABLET ORAL at 08:33

## 2022-02-11 RX ADMIN — INSULIN GLARGINE 20 UNITS: 100 INJECTION, SOLUTION SUBCUTANEOUS at 21:11

## 2022-02-11 RX ADMIN — HYDRALAZINE HYDROCHLORIDE 100 MG: 50 TABLET, FILM COATED ORAL at 21:55

## 2022-02-11 RX ADMIN — AMIODARONE HYDROCHLORIDE 200 MG: 200 TABLET ORAL at 08:54

## 2022-02-11 RX ADMIN — THIAMINE HYDROCHLORIDE 200 MG: 100 INJECTION, SOLUTION INTRAMUSCULAR; INTRAVENOUS at 21:24

## 2022-02-11 RX ADMIN — BUMETANIDE 2 MG: 0.25 INJECTION INTRAMUSCULAR; INTRAVENOUS at 08:38

## 2022-02-11 RX ADMIN — DEXMEDETOMIDINE HYDROCHLORIDE 1.4 MCG/KG/HR: 4 INJECTION, SOLUTION INTRAVENOUS at 22:15

## 2022-02-11 RX ADMIN — LORAZEPAM 2 MG: 2 INJECTION INTRAMUSCULAR; INTRAVENOUS at 15:52

## 2022-02-11 RX ADMIN — LOSARTAN POTASSIUM 50 MG: 50 TABLET, FILM COATED ORAL at 08:54

## 2022-02-11 RX ADMIN — DEXMEDETOMIDINE HYDROCHLORIDE 1.4 MCG/KG/HR: 4 INJECTION, SOLUTION INTRAVENOUS at 02:54

## 2022-02-11 RX ADMIN — DEXMEDETOMIDINE HYDROCHLORIDE 1.4 MCG/KG/HR: 4 INJECTION, SOLUTION INTRAVENOUS at 00:27

## 2022-02-11 RX ADMIN — LORAZEPAM 2 MG: 2 INJECTION INTRAMUSCULAR; INTRAVENOUS at 22:27

## 2022-02-11 RX ADMIN — DEXMEDETOMIDINE HYDROCHLORIDE 1.4 MCG/KG/HR: 4 INJECTION, SOLUTION INTRAVENOUS at 09:56

## 2022-02-11 RX ADMIN — METOPROLOL TARTRATE 25 MG: 25 TABLET, FILM COATED ORAL at 12:47

## 2022-02-11 RX ADMIN — TICAGRELOR 90 MG: 90 TABLET ORAL at 21:13

## 2022-02-11 RX ADMIN — FOLIC ACID 1 MG: 5 INJECTION, SOLUTION INTRAMUSCULAR; INTRAVENOUS; SUBCUTANEOUS at 08:36

## 2022-02-11 RX ADMIN — POTASSIUM BICARBONATE 20 MEQ: 782 TABLET, EFFERVESCENT ORAL at 08:53

## 2022-02-11 RX ADMIN — METOPROLOL TARTRATE 50 MG: 50 TABLET, FILM COATED ORAL at 08:33

## 2022-02-11 RX ADMIN — DEXMEDETOMIDINE HYDROCHLORIDE 1.4 MCG/KG/HR: 4 INJECTION, SOLUTION INTRAVENOUS at 17:20

## 2022-02-11 RX ADMIN — HYDRALAZINE HYDROCHLORIDE 100 MG: 50 TABLET, FILM COATED ORAL at 14:53

## 2022-02-11 RX ADMIN — TICAGRELOR 90 MG: 90 TABLET ORAL at 08:57

## 2022-02-11 RX ADMIN — SODIUM CHLORIDE, PRESERVATIVE FREE 10 ML: 5 INJECTION INTRAVENOUS at 21:14

## 2022-02-11 RX ADMIN — POTASSIUM BICARBONATE 20 MEQ: 782 TABLET, EFFERVESCENT ORAL at 12:43

## 2022-02-11 RX ADMIN — DEXMEDETOMIDINE HYDROCHLORIDE 1.4 MCG/KG/HR: 4 INJECTION, SOLUTION INTRAVENOUS at 12:31

## 2022-02-11 RX ADMIN — HYDRALAZINE HYDROCHLORIDE 100 MG: 50 TABLET, FILM COATED ORAL at 05:07

## 2022-02-11 RX ADMIN — DEXMEDETOMIDINE HYDROCHLORIDE 1.4 MCG/KG/HR: 4 INJECTION, SOLUTION INTRAVENOUS at 07:37

## 2022-02-11 RX ADMIN — THIAMINE HYDROCHLORIDE 200 MG: 100 INJECTION, SOLUTION INTRAMUSCULAR; INTRAVENOUS at 09:02

## 2022-02-11 RX ADMIN — METOPROLOL TARTRATE 75 MG: 50 TABLET, FILM COATED ORAL at 21:13

## 2022-02-11 RX ADMIN — DEXMEDETOMIDINE HYDROCHLORIDE 1.4 MCG/KG/HR: 4 INJECTION, SOLUTION INTRAVENOUS at 14:58

## 2022-02-11 RX ADMIN — PHENOBARBITAL 32.4 MG: 32.4 TABLET ORAL at 21:12

## 2022-02-11 RX ADMIN — LORAZEPAM 2 MG: 2 INJECTION INTRAMUSCULAR; INTRAVENOUS at 08:44

## 2022-02-11 RX ADMIN — DEXMEDETOMIDINE HYDROCHLORIDE 1.4 MCG/KG/HR: 4 INJECTION, SOLUTION INTRAVENOUS at 19:44

## 2022-02-11 RX ADMIN — ENOXAPARIN SODIUM 120 MG: 150 INJECTION SUBCUTANEOUS at 00:28

## 2022-02-11 RX ADMIN — DEXMEDETOMIDINE HYDROCHLORIDE 1.4 MCG/KG/HR: 4 INJECTION, SOLUTION INTRAVENOUS at 05:06

## 2022-02-11 ASSESSMENT — PAIN SCALES - GENERAL
PAINLEVEL_OUTOF10: 0

## 2022-02-11 NOTE — PROGRESS NOTES
Pt pupils noted left greater than right - left 4 and oblong , right 2 round, both reactive- continues to move all 4 extremities at random . Follows no commands at present , thrashing in bed at times. [facility] cnp informed . No new orders at present .

## 2022-02-11 NOTE — PROGRESS NOTES
Reviewed [facility] for AOD consult. Per Epic, \"agitated, violent, unable to follow commands, poor judgement, slurred speech, disoriented x4, \". Unable to complete.

## 2022-02-11 NOTE — PROGRESS NOTES
CRITICAL CARE PROGRESS NOTE      Patient:  Aashish Fuentes    Unit/Bed:4D-18/018-A  YOB: 1965  MRN: [id number]   PCP: ISABELLA Diallo CNP  Date of Admission: 2/7/2022  Chief Complaint:- ETOH withdrawal     Assessment and Plan:      1. Acute alcohol withdrawal: Recent alcohol binge noted.  Patient positive for withdrawal symptoms.  Patient receiving phenobarbital and required Precedex sedation and transferred to the ICU on 2/9.  Monitor.  Significant history of alcohol abuse. 2. Encephalopathy: Secondary to alcohol withdrawal.  Continue phenobarbital and Precedex. 3. Heart failure with reduced ejection fraction: AICD placement, on chronic diuretic 2 mg Bumex daily.  Cardiology consulted due to AICD firing prior to admission. 4. Hypokalemia: Replacement ordered  5. Atrial fibrillation with RVR: Patient on Cardizem drip normally on Coumadin.  Recent noncompliance patient is being bridged back with Lovenox. Add lopressor 2/10  6. Mechanical fall: X-rays negative for acute injuries.  Likely secondary to alcohol intoxication.  PT OT when appropriate. 7. Macrocytic anemia: Hemoglobin 13.4, hematocrit 40.7, no active signs of bleeding.  Monitor. 8. Mild hypernatremia:  Monitor, will add free water to tube feed. 9. Thrombocytopenia: Platelets 86, monitor closely in the setting of use of Lovenox.  No obvious signs of bleeding. Stable  10. Chronic kidney disease stage III: Creatinine at baseline.  Monitor. 11. CAD: LAD stent placed 10/19/2020, on Brilinta  12. Diabetes mellitus: Sliding scale insulin, Lantus. 13. Hyperlipidemia: Lipitor 80 mg nightly  14. Hypertension: Resume home antihypertensives.  Wean Cardizem as tolerated.   15. Tobacco abuse: Nicotine patch     INITIAL H AND P AND ICU COURSE:  Prudence Noel is a 75-year-old white male who presented to [facility] on 2/7/2022 with complaints of chest pain after AICD fired. Negar Dobbs a past medical history of reformed smoker, alcohol abuse, heart failure, anxiety, atrial fibrillation, CAD, cardiomyopathy, CKD, diabetes mellitus, GERD, hyperlipidemia, hypertension, AICD placement.  Per report patient presented to [facility] after chest pain following his AICD firing. Lily Mclean reported that the device and fired 5 times at home on 2/7/2022. Lily Mclean reported that he had chest pain immediately following these events.  He denies chest pain prior. Lily Mclean reports that this is happened to him in the past. Lily Mclean states he has not been taking his medications for the last 5 to 6 days and had been drinking excessively. Lilian Luna states he had drank 1/5 of apple crown whiskey for the past 5 to 6 days. Lily Mclean was agitated upon presentation. Lily Mclean was initially admitted to the hospitalist service. [facility] home medications were resumed.  Cardiology was consulted for AICD firing.  He developed worsening agitation secondary to withdrawal symptoms on 2/8.  CIWA scale was initiated and phenobarbital Dennis protocol the patient continued to have increased symptoms.  He was transferred to the ICU for further care.  On 2/9 patient continues to require phenobarbital and Precedex.  Patient continues to have withdrawal symptoms.  Monitor for ability to wean sedatives.  Monitor closely for need for intubation.     Past Medical History:  Per HPI  Family History: Mother: diabetes, HTN, stroke Father:   Social History: Reformed smoker, occasional alcohol use, denies drug use     ROS   Unable to review secondary to mental status    Scheduled Meds:   metoprolol tartrate  75 mg Oral BID    warfarin  4 mg Oral Once    potassium bicarb-citric acid  20 mEq Oral Daily    amiodarone  200 mg Oral Daily    thiamine (VITAMIN B1) IVPB  200 mg IntraVENous BID    PHENobarbital  260 mg IntraVENous Once    bumetanide  2 mg IntraVENous Daily    PHENobarbital  32.4 mg Oral BID    folic acid  1 mg IntraMUSCular Daily    sodium chloride flush  5-40 mL IntraVENous 2 times per day    multivitamin  1 tablet Oral Daily    atorvastatin  80 mg Oral Nightly    [Held by provider] isosorbide mononitrate  120 mg Oral Daily    ticagrelor  90 mg Oral BID    insulin lispro  0-12 Units SubCUTAneous TID     insulin lispro  0-6 Units SubCUTAneous Nightly    hydrALAZINE  100 mg Oral 3 times per day    losartan  50 mg Oral Daily    nicotine  1 patch TransDERmal Q24H    insulin glargine  20 Units SubCUTAneous Nightly    warfarin placeholder: dosing by pharmacy   Other RX Placeholder     Continuous Infusions:   dexmedetomidine 1.4 mcg/kg/hr (02/11/22 8008)    sodium chloride      dextrose         PHYSICAL EXAMINATION:  T:  98.3.  P:  102. RR:  20. B/P:  127/99. FiO2:  0. O2 Sat:  93.  I/O:  758/800  Body mass index is 33.68 kg/m². General: Acute on chronically ill-appearing white female   HEENT:  normocephalic and atraumatic. No scleral icterus. PERR  Neck: supple. No Thyromegaly. Lungs: clear to auscultation. No retractions  Cardiac: atrial fibrillation. No JVD. Abdomen: soft. Nontender. Extremities:  No clubbing, cyanosis, or edema x 4. Vasculature: capillary refill < 3 seconds. Palpable dorsalis pedis pulses. Skin:  warm and dry. Psych: Active, confused  Lymph:  No supraclavicular adenopathy. Neurologic:  No focal deficit. No seizures. Data: (All radiographs, tracings, PFTs, and imaging are personally viewed and interpreted unless otherwise noted).  Sodium 146, potassium 3.6, chloride 112, CO2 18, BUN 33, creatinine 2.3, anion gap 16.0, glucose 102.  WBC 10.4, hemoglobin 13.4, hematocrit 40.7, platelet count 86   Telemetry shows atrial fibrillation   · KUB 2/9/2022 reports NG tube in the stomach.   · Chest x-ray 2/7/2022 reports no acute process  · Left knee x-ray 2/7/2022 reports no acute abnormality  · Right radial ulnar x-ray 2/7/2022 reports no acute process  · Right humerus x-ray 2/7/2022 reports no acute process  · Renal ultrasound 1/26/2022 reports probable bilateral renal cyst.  Complex lesion of the left kidney malignancy cannot be excluded. .  · EKG 2/7/2022 reports atrial fibrillation with rapid ventricular response  · Echocardiogram 2/8/2022 reports ejection fraction 25 to 30% moderate dilation of left atrium    Meets Continued ICU Level Care Criteria:    [x] Yes   [] No - Transfer Planned to listed location:  [] HOSPITALIST CONTACTED- DR   Electronically signed by Lucretia Hodges. ISABELLA Egan - CNP    Case and plan discussed with Dr. Andrey Louis    Attending attestation  Patient seen and discussed with APRNJerel. Continues to have withdraw, on Precedex and phenobarbital.  Continue Cardizem drip for A. fib, patient has been noncompliant we will bridge back to Coumadin with Lovenox.   Electronically signed by David Kramer MD on 2/11/2022 at 6:26 PM

## 2022-02-11 NOTE — CARE COORDINATION
2/11/22, 6:10 PM EST    DISCHARGE ON GOING EVALUATION    Rose Ramos day: 4  Location: [facility]18/018-A Reason for admit: Alcohol abuse [F10.10]  Atrial fibrillation with rapid ventricular response ([address] [I48.91]  Atrial fibrillation with RVR ([address] [I48.91]  AICD discharge [Z45.02]   Procedure: none  Barriers to Discharge: Still agitated. Free water flushes added for mild hypernatremia. Afebrile. Afib 90's - 100's. On room air. Sitter at bedside. Oriented to person and place. Follows commands. Slurred speech. Intensivist and Cardiology following. Addiction Services consulted. Telemetry, NG clamped, free water flushes, external urinary catheter. Precedex @ 1.4 mcg/kg/hr, amio, lipitor, IV bumex 2 mg daily, IM folic acid daily, prn IV hydralazine, hydralazine, lantus, SSI, prn IV ativan, cozaar, lopressor, nicotine patch, phenobarbital bid, K+, brilinta, IV thiamine, coumadin - pharmacy dosing, Electrolyte replacement protocols. Na+ 146, CO2 18, BUN 33, Creat 2.3, plt 86, INR 1.86. PCP: ISABELLA Pride - CNP  Readmission Risk Score: 19 ( )%  Patient Goals/Plan/Treatment Preferences: From home. Needs meet & greet. No family present and was reported that patient does not want his children knowing he is here unless it is an emergency.  Plan pending clinical course.

## 2022-02-11 NOTE — PROGRESS NOTES
Cardiology Progress Note      Patient:  Steve Hurst  YOB: 1965  MRN: [id number]   Acct: [de-identified]  Admit Date:  2/7/2022  Primary Cardiologist: Harini Dunlap MD    Note per dr Jocy Alvarez for Consultation:  ICD shock        History Of Present Illness:    64 y.o. male CAD status post PCI, severe LV dysfunction status post ICD, atrial fibrillation, alcohol abuse, diabetes, hypertension, hyperlipidemia who presented to the hospital after ICD shock. The ICD shocked him 6 times on 2/7/2022. Patient is not oriented currently in ICU most of the history was obtained from the chart. Per history patient has been drinking excessively at home. In the hospital CIWA scale was initiated in phenobarbital protocol was started. Cardiology was consulted for ICD shocks. Interrogation of the device showed atrial fibrillation with RVR with heart rates in excess of 200 bpm.  No ventricular tachycardia or fibrillation. Echocardiogram on 2/8/2022 showed ejection fraction of 25 to 30% with moderately dilated LA. Creatinine is 2.0, potassium is 3.4, H&H is 12/36\"    Subjective (Events in last 24 hours):   Sleeping  Remains sedated on Precedex gtt; restrained with sitter at bedside  ETOH withdrawal continues  VR better controlled  Cardizem gtt off    2/10/22:  pt sleeping, not waking up for discussion, woke up earlier and followed commands for nurse. NAD.  No reported cp  On RA  On cardizem gtt at 2.5 mg/hr  On precedex at 1  Net I/o -1.6 L    Objective:   /84   Pulse 94   Temp 98.1 °F (36.7 °C) (Oral)   Resp 15   Ht 6' 2\" (1.88 m)   Wt 262 lb 5.6 oz (119 kg)   SpO2 95%   BMI 33.68 kg/m²        TELEMETRY: afib with VR 90 -110; isolated PVC    Physical Exam:  General Appearance: sleeping, not waking up for conversation  Cardiovascular:irregularly irregular  Pulmonary/Chest: clear to auscultation bilaterally- no wheezes, rales or rhonchi, normal air movement, no respiratory distress  Abdomen: soft, non-tender, non-distended, normal bowel sounds, no masses   Extremities: no cyanosis, clubbing or edema, pulses 2+   Skin: warm and dry; ecchymosis R shoulder and R chest wall  Head: normocephalic and atraumatic  Eyes: closed; sleeping - did not assess      Medications:    metoprolol tartrate  50 mg Oral BID    potassium bicarb-citric acid  20 mEq Oral Daily    amiodarone  200 mg Oral Daily    thiamine (VITAMIN B1) IVPB  200 mg IntraVENous BID    PHENobarbital  260 mg IntraVENous Once    enoxaparin  1 mg/kg SubCUTAneous Q12H    bumetanide  2 mg IntraVENous Daily    PHENobarbital  32.4 mg Oral BID    folic acid  1 mg IntraMUSCular Daily    sodium chloride flush  5-40 mL IntraVENous 2 times per day    multivitamin  1 tablet Oral Daily    atorvastatin  80 mg Oral Nightly    [Held by provider] isosorbide mononitrate  120 mg Oral Daily    ticagrelor  90 mg Oral BID    insulin lispro  0-12 Units SubCUTAneous TID WC    insulin lispro  0-6 Units SubCUTAneous Nightly    hydrALAZINE  100 mg Oral 3 times per day    losartan  50 mg Oral Daily    nicotine  1 patch TransDERmal Q24H    insulin glargine  20 Units SubCUTAneous Nightly    warfarin placeholder: dosing by pharmacy   Other RX Placeholder      dexmedetomidine 1.4 mcg/kg/hr (02/11/22 0956)    sodium chloride      dextrose       potassium chloride, 10 mEq, PRN  LORazepam, 2 mg, Q4H PRN  hydrALAZINE, 10 mg, Q6H PRN  sodium chloride flush, 5-40 mL, PRN  sodium chloride, 25 mL, PRN  ondansetron, 4 mg, Q8H PRN   Or  ondansetron, 4 mg, Q6H PRN  polyethylene glycol, 17 g, Daily PRN  acetaminophen, 650 mg, Q6H PRN   Or  acetaminophen, 650 mg, Q6H PRN  nitroGLYCERIN, 0.4 mg, Q5 Min PRN  glucose, 15 g, PRN  glucagon (rDNA), 1 mg, PRN  dextrose, 100 mL/hr, PRN  dextrose bolus (hypoglycemia), 125 mL, PRN   Or  dextrose bolus (hypoglycemia), 250 mL, PRN        Diagnostics:  TTE 2/8/22  Summary   Technically difficult examination.    Left ventricular size is mildly dilated and systolic function is severely   reduced. Ejection fraction was estimated at 25-30%. LV wall thickness is  within normal limits. The left atrium is moderately dilated. Device lead seen in the right heart. Signature    ----------------------------------------------------------------   Electronically signed by Fatou Aguirre MD (Interpreting   physician) on 2022 at 12:42     TTE 10/2020     Summary   Technically difficult examination. Left ventricle size is normal.   Normal left ventricular wall thickness. There was severe global hypokinesis of the left ventricle. Systolic function was severely reduced. Ejection fraction is visually estimated in the range of 30 % to 35%. The left atrium is Moderately dilated. Signature    ----------------------------------------------------------------   Electronically signed by Barber Melendrez MD (Interpreting   physician) on 10/19/2020 at 05:13 PM    Cath: 10/2020  CARDIAC CATHETERIZATION     PATIENT NAME: Jerry Reza                      :        1965  MED REC NO:   [id number]                           ROOM:       0003  ACCOUNT NO:   [de-identified]                           ADMIT DATE: 10/19/2020  PROVIDER:     Caitlyn Estevez MD     DATE OF PROCEDURE:  10/19/2020     INDICATION:  Ventricular tachycardia, ST-elevation myocardial  infarction.     DESCRIPTION OF PROCEDURE:  After a verbal informed consent was obtained  from the patient, he was brought to the cardiac catheterization  laboratory and prepped in sterile fashion. Right femoral artery was  chosen as the primary point of access. Preprocedure timeout was  completed. After infiltration of the right inguinal region with 2%  lidocaine using micropuncture and modified Seldinger technique under  fluoroscopic guidance and ultrasound guidance, I was able to insert a  6-Kyrgyz sheath into the right femoral artery.   Thereafter, diagnostic  coronary angiography was performed using a JL-4.     CORONARY ANGIOGRAM:  LEFT MAIN:  Patent without any significant obstruction.     LAD:  About 50% stenosis in the mid LAD. The distal LAD has mild  luminal irregularities, but no significant obstruction. There is about  90% diagonal stenosis seen in the proximal aspect of the diagonal.     LCX:  There is a previously placed stent in the proximal LCX that is  patent.     RCA:  . Diffusely diseased and there is about 70% to 80% stenosis  proximally and about 90% stenosis in the mid segment.     BYPASS ANGIOGRAPHY:  SVG TO RCA:  Ostium, body, anastomosis of the SVG to RCA are patent. RCA beyond the anastomosis has mild luminal irregularities, but no  significant obstruction. RCA is dominant.     LIMA TO OM:  Ostium, body, and anastomosis of the LIMA to OM are patent. There is no significant disease beyond the OM. The OM again is  perfusing in the antegrade direction.     INTERVENTION:  Given the findings and presentation, I elected to proceed  with intervention. It was clear that the patient is having VT, but it  is unclear which vessels were. The LAD appeared to have 50% stenosis  and the diagonal branch also had 90% stenosis. At this point, likely  the diagonal branch is causing some degree of VT, but I want to make  sure that LAD was not ischemic. Therefore, I chose to proceed with FFR  of the LAD prior to proceeding with intervention given that I will need  to potentially bring the stent back into the ostium of the diagonal  branch which would make stenting of the LAD difficult. I exchanged out  for a EBU 3.75 guiding catheter, 6-Slovak. I cannulated the left main  without any complication. Heparin IV was given. ACT was confirmed to  be above 250 seconds. I wired the LAD using a FFR wire. IC  nitroglycerin was given. IV adenosine was infused. Three minutes after  FFR adenosine infusion the FFR was 0.76. Given these findings, I  elected to proceed with intervention.   I stented the LAD using a 25 cm  Xience Natali GENNARO. Postdilated the stent with a 4.0 x 15 NC balloon up  to 12 to 24 atmospheres. Post-PCI angiography demonstrated JESÚS-3 flow  without any perforation, dissection, distal embolization, side branch  loss, or guide or guidewire-induced trauma. I re-wired and took the FFR  wire out, re-wired the diagonal branch and then stented the diagonal  branch with a 2.5 x 23 Xience Natali GENNARO at 12 atmospheres. I  postdilated the stent with 3.0 x 15 NC balloon up to 20 atmospheres. I  then passed a 2.5 x 33 Xience Natali GENNARO in an overlapping fashion  distal to the first stent deployed at 9 atmospheres. I postdilated the  stent with a 3.0 NC balloon up to 24 atmospheres. Post-PCI angiography  demonstrated JESÚS-3 flow without any perforation, dissection, distal  embolization, side branch loss, or guide or guidewire-induced trauma. All equipments were removed from the patient. The patient tolerated the  procedure well.     IMMEDIATE COMPLICATIONS:  None.     MEDICATIONS:  See EMR.     ESTIMATED BLOOD LOSS:  Less than 50 mL.     SUMMARY:  Successful FFR-guided PCI of the LAD with one drug-eluting stent; successful PCI of the diagonal branch.     PLAN:  1. Bedrest.  2.  IV fluids. 3.  Overnight observation. 4.  Guideline-directed therapy for CAD. 5.  TTE. 6.  Followup with myself in one to two weeks postprocedure.     All the above were explained to the patient and the patient's family. They are agreeable and amenable to the plan.    Verner Safe, MD   D: 10/22/2020 15:44:02    Lab Data:    Cardiac Enzymes:  No results for input(s): CKTOTAL, CKMB, CKMBINDEX, TROPONINI in the last 72 hours.     CBC:   Lab Results   Component Value Date    WBC 10.4 02/11/2022    RBC 4.02 02/11/2022    RBC 4.75 11/03/2011    HGB 13.4 02/11/2022    HCT 40.7 02/11/2022    PLT 86 02/11/2022       CMP:    Lab Results   Component Value Date     02/11/2022    K 3.6 02/11/2022    K 3.3 02/10/2022  02/11/2022    CO2 18 02/11/2022    BUN 33 02/11/2022    CREATININE 2.3 02/11/2022    LABGLOM 29 02/11/2022    GLUCOSE 102 02/11/2022    CALCIUM 7.9 02/11/2022       Hepatic Function Panel:    Lab Results   Component Value Date    ALKPHOS 99 02/08/2022    ALT 20 02/08/2022    AST 30 02/08/2022    PROT 5.8 02/08/2022    BILITOT 0.9 02/08/2022    BILIDIR <0.2 02/07/2022    LABALBU 3.2 02/08/2022       Magnesium:    Lab Results   Component Value Date    MG 1.9 02/10/2022       PT/INR:    Lab Results   Component Value Date    INR 1.86 02/11/2022       HgBA1c:    Lab Results   Component Value Date    LABA1C 7.0 02/07/2022       FLP:    Lab Results   Component Value Date    TRIG 134 11/12/2021    HDL 48 11/12/2021    LDLCALC 73 11/12/2021       TSH:    Lab Results   Component Value Date    TSH 5.750 02/07/2022         Assessment/Plan:  AICD shocks - due to afib rvr, no ventricular arrhythmia on interrogation     - no further shocks noed   - Chronic afib cvr ; on coumadin PTA - coumadin continues   - afib with VR 90 - 100's   - Lopressor 50 mg BID    - increase to 75 mg BID and monitor    - BP stable 110 - 120/   - Amiodarone 200 mg QD   Electrolytes:   - K 3.6 - 20 meq po scheduled daily - additional 20 meq ordered   - Mag - 2.2    - keep K >4; Mag > 2.0   CAD   - prior PCI, hx CABG   - continue Brilinta, statin and BB   - IMDUR currently on hold  Severe CMP   HFrEF:  ef 25-30 per TTE 2/8/22, ef 30-35 per TTE 10/2020   Hx AICD   Continue GDMT: BB, ARB, diuretic  HTN    - Norvasc on hold    - Apresoline continues  DM  dyslipidemia  ETOH abuse/withdrawal - on protocol per icu team  CKD    Cardiology will follow on prn basis over the weekend. Please call with questions or concerns.       Electronically signed by ISABELLA Palacios CNP on 2/11/2022 at 10:50 AM

## 2022-02-12 ENCOUNTER — APPOINTMENT (OUTPATIENT)
Dept: CT IMAGING | Age: 57
DRG: 308 | End: 2022-02-12
Payer: COMMERCIAL

## 2022-02-12 LAB
ANION GAP SERPL CALCULATED.3IONS-SCNC: 18 MEQ/L (ref 8–16)
BUN BLDV-MCNC: 34 MG/DL (ref 7–22)
CALCIUM SERPL-MCNC: 8.2 MG/DL (ref 8.5–10.5)
CHLORIDE BLD-SCNC: 112 MEQ/L (ref 98–111)
CO2: 18 MEQ/L (ref 23–33)
CREAT SERPL-MCNC: 2.4 MG/DL (ref 0.4–1.2)
ERYTHROCYTE [DISTWIDTH] IN BLOOD BY AUTOMATED COUNT: 14.9 % (ref 11.5–14.5)
ERYTHROCYTE [DISTWIDTH] IN BLOOD BY AUTOMATED COUNT: 57 FL (ref 35–45)
GFR SERPL CREATININE-BSD FRML MDRD: 28 ML/MIN/1.73M2
GLUCOSE BLD-MCNC: 78 MG/DL (ref 70–108)
GLUCOSE BLD-MCNC: 91 MG/DL (ref 70–108)
GLUCOSE BLD-MCNC: 92 MG/DL (ref 70–108)
GLUCOSE BLD-MCNC: 94 MG/DL (ref 70–108)
GLUCOSE BLD-MCNC: 98 MG/DL (ref 70–108)
HCT VFR BLD CALC: 41.8 % (ref 42–52)
HEMOGLOBIN: 13.1 GM/DL (ref 14–18)
INR BLD: 2.85 (ref 0.85–1.13)
MCH RBC QN AUTO: 33.5 PG (ref 26–33)
MCHC RBC AUTO-ENTMCNC: 31.3 GM/DL (ref 32.2–35.5)
MCV RBC AUTO: 106.9 FL (ref 80–94)
PLATELET # BLD: 97 THOU/MM3 (ref 130–400)
PMV BLD AUTO: 10.9 FL (ref 9.4–12.4)
POTASSIUM SERPL-SCNC: 3.6 MEQ/L (ref 3.5–5.2)
RBC # BLD: 3.91 MILL/MM3 (ref 4.7–6.1)
SODIUM BLD-SCNC: 148 MEQ/L (ref 135–145)
WBC # BLD: 9.3 THOU/MM3 (ref 4.8–10.8)

## 2022-02-12 PROCEDURE — 6370000000 HC RX 637 (ALT 250 FOR IP): Performed by: FAMILY MEDICINE

## 2022-02-12 PROCEDURE — 92610 EVALUATE SWALLOWING FUNCTION: CPT

## 2022-02-12 PROCEDURE — 2100000000 HC CCU R&B

## 2022-02-12 PROCEDURE — 6370000000 HC RX 637 (ALT 250 FOR IP): Performed by: PHYSICIAN ASSISTANT

## 2022-02-12 PROCEDURE — 80048 BASIC METABOLIC PNL TOTAL CA: CPT

## 2022-02-12 PROCEDURE — 2500000003 HC RX 250 WO HCPCS: Performed by: NURSE PRACTITIONER

## 2022-02-12 PROCEDURE — 2500000003 HC RX 250 WO HCPCS: Performed by: STUDENT IN AN ORGANIZED HEALTH CARE EDUCATION/TRAINING PROGRAM

## 2022-02-12 PROCEDURE — 99233 SBSQ HOSP IP/OBS HIGH 50: CPT | Performed by: HOSPITALIST

## 2022-02-12 PROCEDURE — 85610 PROTHROMBIN TIME: CPT

## 2022-02-12 PROCEDURE — 6370000000 HC RX 637 (ALT 250 FOR IP): Performed by: NURSE PRACTITIONER

## 2022-02-12 PROCEDURE — 2580000003 HC RX 258: Performed by: NURSE PRACTITIONER

## 2022-02-12 PROCEDURE — 82948 REAGENT STRIP/BLOOD GLUCOSE: CPT

## 2022-02-12 PROCEDURE — 85027 COMPLETE CBC AUTOMATED: CPT

## 2022-02-12 PROCEDURE — 70450 CT HEAD/BRAIN W/O DYE: CPT

## 2022-02-12 PROCEDURE — 36415 COLL VENOUS BLD VENIPUNCTURE: CPT

## 2022-02-12 PROCEDURE — 6360000002 HC RX W HCPCS: Performed by: NURSE PRACTITIONER

## 2022-02-12 PROCEDURE — APPSS180 APP SPLIT SHARED TIME > 60 MINUTES: Performed by: NURSE PRACTITIONER

## 2022-02-12 PROCEDURE — 2580000003 HC RX 258: Performed by: PHYSICIAN ASSISTANT

## 2022-02-12 RX ORDER — LANOLIN ALCOHOL/MO/W.PET/CERES
100 CREAM (GRAM) TOPICAL DAILY
Status: DISCONTINUED | OUTPATIENT
Start: 2022-02-12 | End: 2022-02-17 | Stop reason: HOSPADM

## 2022-02-12 RX ORDER — SODIUM BICARBONATE 650 MG/1
650 TABLET ORAL 4 TIMES DAILY
Status: DISCONTINUED | OUTPATIENT
Start: 2022-02-12 | End: 2022-02-17 | Stop reason: HOSPADM

## 2022-02-12 RX ADMIN — DEXMEDETOMIDINE HYDROCHLORIDE 1.4 MCG/KG/HR: 4 INJECTION, SOLUTION INTRAVENOUS at 23:56

## 2022-02-12 RX ADMIN — POTASSIUM BICARBONATE 20 MEQ: 782 TABLET, EFFERVESCENT ORAL at 11:14

## 2022-02-12 RX ADMIN — TICAGRELOR 90 MG: 90 TABLET ORAL at 11:14

## 2022-02-12 RX ADMIN — HYDRALAZINE HYDROCHLORIDE 100 MG: 50 TABLET, FILM COATED ORAL at 04:14

## 2022-02-12 RX ADMIN — PHENOBARBITAL 32.4 MG: 32.4 TABLET ORAL at 12:30

## 2022-02-12 RX ADMIN — DEXMEDETOMIDINE HYDROCHLORIDE 1.4 MCG/KG/HR: 4 INJECTION, SOLUTION INTRAVENOUS at 05:33

## 2022-02-12 RX ADMIN — Medication 1 TABLET: at 11:13

## 2022-02-12 RX ADMIN — SODIUM CHLORIDE, PRESERVATIVE FREE 10 ML: 5 INJECTION INTRAVENOUS at 12:49

## 2022-02-12 RX ADMIN — AMIODARONE HYDROCHLORIDE 200 MG: 200 TABLET ORAL at 14:39

## 2022-02-12 RX ADMIN — METOPROLOL TARTRATE 75 MG: 50 TABLET, FILM COATED ORAL at 11:13

## 2022-02-12 RX ADMIN — THIAMINE HYDROCHLORIDE 200 MG: 100 INJECTION, SOLUTION INTRAMUSCULAR; INTRAVENOUS at 12:45

## 2022-02-12 RX ADMIN — DEXMEDETOMIDINE HYDROCHLORIDE 1.2 MCG/KG/HR: 4 INJECTION, SOLUTION INTRAVENOUS at 15:47

## 2022-02-12 RX ADMIN — LOSARTAN POTASSIUM 50 MG: 50 TABLET, FILM COATED ORAL at 11:13

## 2022-02-12 RX ADMIN — BUMETANIDE 2 MG: 0.25 INJECTION INTRAMUSCULAR; INTRAVENOUS at 11:15

## 2022-02-12 RX ADMIN — LORAZEPAM 2 MG: 2 INJECTION INTRAMUSCULAR; INTRAVENOUS at 17:43

## 2022-02-12 RX ADMIN — DEXMEDETOMIDINE HYDROCHLORIDE 1.4 MCG/KG/HR: 4 INJECTION, SOLUTION INTRAVENOUS at 00:41

## 2022-02-12 RX ADMIN — HYDRALAZINE HYDROCHLORIDE 100 MG: 50 TABLET, FILM COATED ORAL at 13:52

## 2022-02-12 RX ADMIN — DEXMEDETOMIDINE HYDROCHLORIDE 1.4 MCG/KG/HR: 4 INJECTION, SOLUTION INTRAVENOUS at 13:12

## 2022-02-12 RX ADMIN — DEXMEDETOMIDINE HYDROCHLORIDE 1.4 MCG/KG/HR: 4 INJECTION, SOLUTION INTRAVENOUS at 18:18

## 2022-02-12 RX ADMIN — DEXMEDETOMIDINE HYDROCHLORIDE 1.4 MCG/KG/HR: 4 INJECTION, SOLUTION INTRAVENOUS at 03:09

## 2022-02-12 RX ADMIN — DEXMEDETOMIDINE HYDROCHLORIDE 1.4 MCG/KG/HR: 4 INJECTION, SOLUTION INTRAVENOUS at 20:50

## 2022-02-12 RX ADMIN — FOLIC ACID 1 MG: 5 INJECTION, SOLUTION INTRAMUSCULAR; INTRAVENOUS; SUBCUTANEOUS at 13:48

## 2022-02-12 ASSESSMENT — PAIN SCALES - GENERAL
PAINLEVEL_OUTOF10: 0

## 2022-02-12 ASSESSMENT — PAIN SCALES - WONG BAKER
WONGBAKER_NUMERICALRESPONSE: 0

## 2022-02-12 NOTE — PROGRESS NOTES
present. Patient was able to state name and current month correctly. OBJECTIVE:    Pain:  No pain reported. Current Diet: NPO     Respiratory Status:  Independent    Behavioral Observation:  Alert, Confused, Lethargic and Limited Direction Following    Oral Mechanism Evaluation:      Facial / Labial WFL    Lingual WFL    Dentition WFL    Velum WFL    Vocal Quality WFL    Sensation WFL    Cough Not Tested      Patient Evaluated Using: Thin liquids via straw, puree, hard solids     Oral Phase:  Impaired:  Impaired AP Movement, Impaired Mastication and Reduced Bolus Formation    Pharyngeal Phase: WFL:  Pharyngeal phase appears WFL but cannot rule out pharyngeal phase deficits from a bedside swallowing evaluation alone. Signs and Symptoms of Laryngeal Penetration/Aspiration: No signs/symptoms of aspiration evident in this evaluation, but cannot rule out silent aspiration. Impresssions: Patient presents with moderate oral dysphagia with inability to fully discern potential presence of pharyngeal phase deficits without formal instrumentation. Patient confused with high level of fatigue. Patient able to follow very basic 1 step commands; although highly distracted/poor attention noted. OME presents to be [facility]. Patient consumed PO trials of thin liquids via straw + puree; appropriate oral control, no s/s of aspiration. PO trials of hard solid completed; appropriate oral initiation-however, upon hard solid within oral cavity patient with no effort to masticate hard solid. Patient highly distracted by television on; confused language noted as well. ST provided direct verbal cues in attempt to masticate hard solid; eventual achievement with trace-mild residue remaining on dorsal surface of tongue-cleared provided thin liquid wash; no s/s of aspiration.  Certainly cannot fully evaluate pharyngeal function at bedside alone; patient did consume multiple PO trials of thin liquids with dry and clear vocal quality, no s/s of aspiration or respiratory distress. ST recommending minced and moist diet with thin liquids, direct 1:1 assistance and elimination of distractions. Education provided to patient and live sitter. Patient would benefit from ongoing skilled ST services to target dysphagia with goals to return to baseline diet. RECOMMENDATIONS/ASSESSMENT:  Instrumental Evaluation: Instrumental evaluation not indicated at this time. Diet Recommendations:  Minced and moist diet with thin liquids   Strategies:  Full Upright Position, Small Bite/Sip, Pulmonary Monitoring, Direct 1:1 Supervision, Limit Distractions and Monitor for Fatigue   Rehabilitation Potential: fair    EDUCATION:  Learner: Patient  Education:  Reviewed results and recommendations of this evaluation, Reviewed diet and strategies, Reviewed signs, symptoms and risks of aspiration, Reviewed ST goals and Plan of Care, Reviewed recommendations for follow-up and Education Related to Potential Risks and Complications Due to Impairment/Illness/Injury  Evaluation of Education: Needs further instruction, No evidence of learning and Family not present    PLAN:  Skilled SLP intervention on acute care 3-5 x per week or until goals met and/or pt plateaus in function. Specific interventions for next session may include: dysphagia tx. PATIENT GOAL:    Did not state. Will further assess during treatment. SHORT TERM GOALS:  Short-term Goals  Timeframe for Short-term Goals: 2 weeks  Goal 1: Patient will consume minced and moist diet with thin liquids without s/s of aspiration + direct 1:1 assistance/supervision in order to safely maintain adequate hydration and nutrition  Goal 2: Patient will complete advanced textures without s/s of aspiration + appropriate awareness to the bolus with SLP ONLY in order to safely advance patient diet  Goal 3: Complete cognitive/speech/language evaluation when clinically appropriate.     LONG TERM GOALS:  No LTGs due to short ELOS MICHAEL Singleton 23

## 2022-02-12 NOTE — PROGRESS NOTES
Clinical Pharmacy Note    Warfarin consult follow-up    Recent Labs     02/12/22  0320   INR 2.85*     Recent Labs     02/10/22  0522 02/11/22  0458 02/12/22  0320   HGB 12.8* 13.4* 13.1*   HCT 39.6* 40.7* 41.8*   PLT 87* 86* 97*     Significant Drug-Drug Interactions:  New warfarin drug-drug interactions: None  Discontinued drug-drug interactions: Enoxaparin 1mg/kg q12h   Current warfarin drug-drug interactions: ticagrelor (), phenobarbital, amiodarone (HM)     Date INR Warfarin Dose   2/7/2022 1.05 7.5 mg   2/8/2022 1.02  not given by RN   2/9/2022 1.33 5 mg   2/10/2022 1.40 6 mg   2/11/2022 1.86 4 mg   2/12/2022 2.85 No warfarin             Notes:                   PT/INR or POC-INR will be monitored routinely until therapeutic INR is achieved.     Elsa Lemon, PharmD  2/12/2022 1:20 PM

## 2022-02-12 NOTE — PROGRESS NOTES
CRITICAL CARE PROGRESS NOTE      Patient:  Rosa Elena Cost    Unit/Bed:4B-07/007-A  YOB: 1965  MRN: [id number]   PCP: ISABELLA Silva CNP  Date of Admission: 2/7/2022  Chief Complaint:- ETOH withdrawal     Assessment and Plan:      1. Acute alcohol withdrawal: Recent alcohol binge noted.  Patient positive for withdrawal symptoms.  Patient receiving phenobarbital and required Precedex sedation and transferred to the ICU on 2/9.  Monitor.  Significant history of alcohol abuse. 2. Encephalopathy: Secondary to alcohol withdrawal.  Continue phenobarbital and Precedex. Improving mentation   3. Heart failure with reduced ejection fraction: AICD placement, on chronic diuretic 2 mg Bumex daily.  Cardiology consulted due to AICD firing prior to admission. 4. Hypokalemia: Replacement ordered  5. Atrial fibrillation with RVR: Patient on Cardizem drip normally on Coumadin.  Therapeutic INR achieved 2/12. Add lopressor 2/10  6. Mechanical fall: X-rays negative for acute injuries.  Likely secondary to alcohol intoxication.  PT OT when appropriate. 7. Macrocytic anemia: Hemoglobin 13.1, hematocrit 41.8, no active signs of bleeding.  Monitor. 8. Mild hypernatremia:  Monitor, started oral diet   9. Thrombocytopenia: Platelets 97, monitor closely in the setting of use of Lovenox.  No obvious signs of bleeding. Stable  10. Chronic kidney disease stage III: Creatinine at baseline.  Monitor. 11. Anion gap metabolic acidosis: Secondary to renal failure. Add bicarbonate tabs. 12. CAD: LAD stent placed 10/19/2020, on Brilinta  13. Diabetes mellitus: Sliding scale insulin, Lantus. 14. Hyperlipidemia: Lipitor 80 mg nightly  15. Hypertension: Resume home antihypertensives.  Wean Cardizem as tolerated. 16. Tobacco abuse: Nicotine patch     INITIAL H AND P AND ICU COURSE:  Malu Ac is a 51-year-old white male who presented to [facility] on 2/7/2022 with complaints of chest pain after AICD fired.  Has a past medical history of reformed smoker, alcohol abuse, heart failure, anxiety, atrial fibrillation, CAD, cardiomyopathy, CKD, diabetes mellitus, GERD, hyperlipidemia, hypertension, AICD placement.  Per report patient presented to [facility] after chest pain following his AICD firing. Geovanny Estevez reported that the device and fired 5 times at home on 2/7/2022. Geovanny Estevez reported that he had chest pain immediately following these events.  He denies chest pain prior. Geovanny Estevez reports that this is happened to him in the past. Geovanny Estevez states he has not been taking his medications for the last 5 to 6 days and had been drinking excessively. Collin Turner states he had drank 1/5 of apple crown whiskey for the past 5 to 6 days. Geovanny Estevez was agitated upon presentation. Geovanny Estevez was initially admitted to the hospitalist service. [facility] home medications were resumed.  Cardiology was consulted for AICD firing.  He developed worsening agitation secondary to withdrawal symptoms on 2/8.  CIWA scale was initiated and phenobarbital Dennis protocol the patient continued to have increased symptoms.  He was transferred to the ICU for further care.  On 2/9 patient continues to require phenobarbital and Precedex.  Patient continues to have withdrawal symptoms.  Monitor for ability to wean sedatives.  Monitor closely for need for intubation.     Past Medical History:  Per HPI  Family History: Mother: diabetes, HTN, stroke Father:   Social History: Reformed smoker, occasional alcohol use, denies drug use     ROS   Unable to review secondary to mental status     Scheduled Meds:   metoprolol tartrate  75 mg Oral BID    potassium bicarb-citric acid  20 mEq Oral Daily    amiodarone  200 mg Oral Daily    thiamine (VITAMIN B1) IVPB  200 mg IntraVENous BID    PHENobarbital  260 mg IntraVENous Once    bumetanide  2 mg IntraVENous Daily    PHENobarbital  32.4 mg Oral BID    folic acid  1 mg IntraMUSCular Daily    sodium chloride flush  5-40 mL IntraVENous 2 times per day    multivitamin  1 tablet Oral Daily    atorvastatin  80 mg Oral Nightly    [Held by provider] isosorbide mononitrate  120 mg Oral Daily    ticagrelor  90 mg Oral BID    insulin lispro  0-12 Units SubCUTAneous TID     insulin lispro  0-6 Units SubCUTAneous Nightly    hydrALAZINE  100 mg Oral 3 times per day    losartan  50 mg Oral Daily    nicotine  1 patch TransDERmal Q24H    insulin glargine  20 Units SubCUTAneous Nightly    warfarin placeholder: dosing by pharmacy   Other RX Placeholder     Continuous Infusions:   dexmedetomidine 1.4 mcg/kg/hr (02/12/22 1312)    sodium chloride      dextrose         PHYSICAL EXAMINATION:  T:  98.8. P:  88. RR:  26. B/P:  126/103. FiO2:  0. O2 Sat:  96.  I/O:  1553/1550  Body mass index is 33.77 kg/m². General:   Acute on chronically ill-appearing white male  HEENT:  normocephalic and atraumatic. No scleral icterus. PERR  Neck: supple. No Thyromegaly. Lungs: clear to auscultation. No retractions  Cardiac: Atrial fibrillation. No JVD. Abdomen: soft. Nontender. Extremities:  No clubbing, cyanosis, or edema x 4. Vasculature: capillary refill < 3 seconds. Palpable dorsalis pedis pulses. Skin:  warm and dry. Psych:  Alert and oriented x1. Awakes and intermittently follows commands  Lymph:  No supraclavicular adenopathy. Neurologic:  No focal deficit. No seizures. Data: (All radiographs, tracings, PFTs, and imaging are personally viewed and interpreted unless otherwise noted).  Sodium 148, potassium 3.6, chloride 112, CO2 18, BUN 34, creatinine 2.4, anion gap 18.0, glucose 98.  WBC 9.3, hemoglobin 15.1, hematocrit 41.8, platelet count 97   Telemetry shows atrial fibrillation  · KUB 2/9/2022 reports NG tube in the stomach.   · Chest x-ray 2/7/2022 reports no acute process  · Left knee x-ray 2/7/2022 reports no acute abnormality  · Right radial ulnar x-ray 2/7/2022 reports no acute process  · Right humerus x-ray 2/7/2022 reports no acute process  · Renal ultrasound 1/26/2022 reports probable bilateral renal cyst.  Complex lesion of the left kidney malignancy cannot be excluded. .  · EKG 2/7/2022 reports atrial fibrillation with rapid ventricular response  · Echocardiogram 2/8/2022 reports ejection fraction 25 to 30% moderate dilation of left atrium      Meets Continued ICU Level Care Criteria:    [x] Yes   [] No - Transfer Planned to listed location:  [] HOSPITALIST CONTACTED-      Case and plan discussed with Dr. Sudeep Wong        Electronically signed by Missael Arguelles.  ISABELLA Ann - CNP  CRITICAL CARE SPECIALIST

## 2022-02-12 NOTE — FLOWSHEET NOTE
1130 Care assumed. Mr Carl Santillan rests in the bed without apparent distress. He does appear anxious at times. He has a sitter at the bedside. He was able to demonstrate taking water without difficulty and was restarted on his oral meds. He tolerates this well. He has precedex gtt infusing at 1.4ug/kg/hr which is max dose. 1800 He has become much more agitated and pulled out his IV left forearm. He was given 2 mg of Ativan IVP as he is attempting to crawl over the bed rails. l

## 2022-02-12 NOTE — PROGRESS NOTES
Comprehensive Nutrition Assessment    Type and Reason for Visit:  Initial,Consult (tube feeding ordering & management)    Nutrition Recommendations/Plan:   If pt unable to swallow safely & NGT replaced, suggest Nepro 20ml/hr & increase by 10ml every 4 hrs as tolerated to goal 45ml/hr is met as tolerated. Free water flushes per MD.  Consider SLP swallow eval to insure swallowing safety as appropriate. Nutrition Assessment:     Pt. nutritionally compromised AEB pt pulled out NGT (2/12)  At risk for further nutrition compromise r/t Acute Alcohol Withdrawal, Encephalopathy, HF, Atrial Fib with RVR, Mechanical Fall  and underlying medical condition (Macrocytic Anemia, CKD III, CAD, DM, HTN, Drug Abuse). Malnutrition Assessment:  Malnutrition Status: At risk for malnutrition (Comment)    Context:  Acute Illness     Findings of the 6 clinical characteristics of malnutrition:  Energy Intake:  Mild decrease in energy intake (Comment) (< 50%)  Weight Loss: Body Fat Loss:  No significant body fat loss     Muscle Mass Loss:  No significant muscle mass loss    Fluid Accumulation:  Unable to assess     Strength:  Not Performed    Estimated Daily Nutrient Needs:  Energy (kcal):  1780-2147kcals (15-18kcals/kgm); Weight Used for Energy Requirements:   (119.3kgm (2/11))     Protein (g):  ~86 grams as renal function tolerates (1 gram protein/kgm IBW); Weight Used for Protein Requirements:  Ideal (86 kgm IBW)        Fluid (ml/day):   ;per MD   Nutrition Related Findings:  Pt admitted with Acute Alcohol Withdrawal & pulled out NGT. Jose Ortiz RN mentions pt  apepared to tolerate took sips of water  this am & no plans to replace NGT at this time. Labs: (2/12) Na 148, BUn 34, Creatinine 2.4, chemsticks , Ca Serum 8.2. Meds: Bumex, Folic Acid, Lantus, Humalog, MVI, Thiamine, Precedex, Zofran , Glycolax. No BM.       Wounds:  None       Current Nutrition Therapies:    No diet orders on file    Anthropometric Measures:  · Height: 6' 2\" (188 cm)  · Current Body Weight: 263 lb 0.1 oz (119.3 kg)   · Admission Body Weight:   (2/8) 262# 5.6oz bed scale (no edema)   · Usual Body Weight:     Per EMR: (1/24) 280#, (11/22) 260@ 14.4oz, (10/6) 267# 12.8oz  · Ideal Body Weight: 190 lbs;   · BMI: 33.8  · Adjusted Body Weight:  ; No Adjustment   ·     · BMI Categories: Obese Class 1 (BMI 30.0-34. 9)       Nutrition Diagnosis:   · Inadequate oral intake related to cognitive or neurological impairment (alcohol withdrawal) as evidenced by NPO or clear liquid status due to medical condition      Nutrition Interventions:   Food and/or Nutrient Delivery:  Continue NPO (Diet per MD/SLP as appropriate)  Nutrition Education/Counseling:  No recommendation at this time   Coordination of Nutrition Care:  Continue to monitor while inpatient    Goals:  Pt will meet nutirtional needs within 1-4 days       Nutrition Monitoring and Evaluation:   Behavioral-Environmental Outcomes:  Readiness for Change   Food/Nutrient Intake Outcomes:  None Identified  Physical Signs/Symptoms Outcomes:  Biochemical Data,Chewing or Swallowing,GI Status,Fluid Status or Edema,Hemodynamic Status,Nutrition Focused Physical Findings,Skin,Weight     Discharge Planning:     Too soon to determine     Electronically signed by Yessenia Chaudhary RD, LD on 2/12/22 at 12:19 PM EST    Contact: [phone]

## 2022-02-13 LAB
ANION GAP SERPL CALCULATED.3IONS-SCNC: 14 MEQ/L (ref 8–16)
BUN BLDV-MCNC: 29 MG/DL (ref 7–22)
CALCIUM SERPL-MCNC: 7.8 MG/DL (ref 8.5–10.5)
CHLORIDE BLD-SCNC: 109 MEQ/L (ref 98–111)
CO2: 19 MEQ/L (ref 23–33)
CREAT SERPL-MCNC: 2.4 MG/DL (ref 0.4–1.2)
ERYTHROCYTE [DISTWIDTH] IN BLOOD BY AUTOMATED COUNT: 14.8 % (ref 11.5–14.5)
ERYTHROCYTE [DISTWIDTH] IN BLOOD BY AUTOMATED COUNT: 55 FL (ref 35–45)
GFR SERPL CREATININE-BSD FRML MDRD: 28 ML/MIN/1.73M2
GLUCOSE BLD-MCNC: 154 MG/DL (ref 70–108)
GLUCOSE BLD-MCNC: 174 MG/DL (ref 70–108)
GLUCOSE BLD-MCNC: 182 MG/DL (ref 70–108)
GLUCOSE BLD-MCNC: 223 MG/DL (ref 70–108)
HCT VFR BLD CALC: 37.7 % (ref 42–52)
HEMOGLOBIN: 12.1 GM/DL (ref 14–18)
INR BLD: 3.87 (ref 0.85–1.13)
MCH RBC QN AUTO: 33.2 PG (ref 26–33)
MCHC RBC AUTO-ENTMCNC: 32.1 GM/DL (ref 32.2–35.5)
MCV RBC AUTO: 103.3 FL (ref 80–94)
PLATELET # BLD: 105 THOU/MM3 (ref 130–400)
PMV BLD AUTO: 10.2 FL (ref 9.4–12.4)
POTASSIUM SERPL-SCNC: 3.4 MEQ/L (ref 3.5–5.2)
RBC # BLD: 3.65 MILL/MM3 (ref 4.7–6.1)
SODIUM BLD-SCNC: 142 MEQ/L (ref 135–145)
WBC # BLD: 7 THOU/MM3 (ref 4.8–10.8)

## 2022-02-13 PROCEDURE — 6370000000 HC RX 637 (ALT 250 FOR IP): Performed by: NURSE PRACTITIONER

## 2022-02-13 PROCEDURE — 6370000000 HC RX 637 (ALT 250 FOR IP): Performed by: PHYSICIAN ASSISTANT

## 2022-02-13 PROCEDURE — 80048 BASIC METABOLIC PNL TOTAL CA: CPT

## 2022-02-13 PROCEDURE — 2100000000 HC CCU R&B

## 2022-02-13 PROCEDURE — 85610 PROTHROMBIN TIME: CPT

## 2022-02-13 PROCEDURE — 99233 SBSQ HOSP IP/OBS HIGH 50: CPT | Performed by: HOSPITALIST

## 2022-02-13 PROCEDURE — 85027 COMPLETE CBC AUTOMATED: CPT

## 2022-02-13 PROCEDURE — 2500000003 HC RX 250 WO HCPCS: Performed by: STUDENT IN AN ORGANIZED HEALTH CARE EDUCATION/TRAINING PROGRAM

## 2022-02-13 PROCEDURE — 36415 COLL VENOUS BLD VENIPUNCTURE: CPT

## 2022-02-13 PROCEDURE — 2500000003 HC RX 250 WO HCPCS: Performed by: NURSE PRACTITIONER

## 2022-02-13 PROCEDURE — 6360000002 HC RX W HCPCS: Performed by: NURSE PRACTITIONER

## 2022-02-13 PROCEDURE — 6370000000 HC RX 637 (ALT 250 FOR IP): Performed by: FAMILY MEDICINE

## 2022-02-13 PROCEDURE — 2580000003 HC RX 258: Performed by: PHYSICIAN ASSISTANT

## 2022-02-13 PROCEDURE — 6370000000 HC RX 637 (ALT 250 FOR IP): Performed by: HOSPITALIST

## 2022-02-13 PROCEDURE — 82948 REAGENT STRIP/BLOOD GLUCOSE: CPT

## 2022-02-13 RX ORDER — ACETAMINOPHEN 650 MG/1
650 SUPPOSITORY RECTAL EVERY 4 HOURS PRN
Status: DISCONTINUED | OUTPATIENT
Start: 2022-02-13 | End: 2022-02-17 | Stop reason: HOSPADM

## 2022-02-13 RX ORDER — ACETAMINOPHEN 325 MG/1
650 TABLET ORAL EVERY 4 HOURS PRN
Status: DISCONTINUED | OUTPATIENT
Start: 2022-02-13 | End: 2022-02-17 | Stop reason: HOSPADM

## 2022-02-13 RX ORDER — IBUPROFEN 200 MG
200 TABLET ORAL ONCE
Status: COMPLETED | OUTPATIENT
Start: 2022-02-13 | End: 2022-02-13

## 2022-02-13 RX ADMIN — LOSARTAN POTASSIUM 50 MG: 50 TABLET, FILM COATED ORAL at 08:48

## 2022-02-13 RX ADMIN — DEXMEDETOMIDINE HYDROCHLORIDE 1.4 MCG/KG/HR: 4 INJECTION, SOLUTION INTRAVENOUS at 06:19

## 2022-02-13 RX ADMIN — DEXMEDETOMIDINE HYDROCHLORIDE 1.4 MCG/KG/HR: 4 INJECTION, SOLUTION INTRAVENOUS at 08:45

## 2022-02-13 RX ADMIN — METOPROLOL TARTRATE 75 MG: 50 TABLET, FILM COATED ORAL at 08:49

## 2022-02-13 RX ADMIN — Medication 100 MG: at 09:49

## 2022-02-13 RX ADMIN — INSULIN GLARGINE 20 UNITS: 100 INJECTION, SOLUTION SUBCUTANEOUS at 19:57

## 2022-02-13 RX ADMIN — SODIUM BICARBONATE 650 MG: 650 TABLET ORAL at 19:56

## 2022-02-13 RX ADMIN — POTASSIUM BICARBONATE 20 MEQ: 782 TABLET, EFFERVESCENT ORAL at 08:46

## 2022-02-13 RX ADMIN — DEXMEDETOMIDINE HYDROCHLORIDE 0.8 MCG/KG/HR: 4 INJECTION, SOLUTION INTRAVENOUS at 21:40

## 2022-02-13 RX ADMIN — ATORVASTATIN CALCIUM 80 MG: 80 TABLET, FILM COATED ORAL at 19:56

## 2022-02-13 RX ADMIN — ISOSORBIDE MONONITRATE 120 MG: 60 TABLET, EXTENDED RELEASE ORAL at 09:50

## 2022-02-13 RX ADMIN — LORAZEPAM 2 MG: 2 INJECTION INTRAMUSCULAR; INTRAVENOUS at 06:18

## 2022-02-13 RX ADMIN — ACETAMINOPHEN 650 MG: 325 TABLET ORAL at 18:31

## 2022-02-13 RX ADMIN — AMIODARONE HYDROCHLORIDE 200 MG: 200 TABLET ORAL at 08:49

## 2022-02-13 RX ADMIN — DEXMEDETOMIDINE HYDROCHLORIDE 1 MCG/KG/HR: 4 INJECTION, SOLUTION INTRAVENOUS at 13:47

## 2022-02-13 RX ADMIN — TICAGRELOR 90 MG: 90 TABLET ORAL at 08:47

## 2022-02-13 RX ADMIN — SODIUM BICARBONATE 650 MG: 650 TABLET ORAL at 08:54

## 2022-02-13 RX ADMIN — HYDRALAZINE HYDROCHLORIDE 100 MG: 50 TABLET, FILM COATED ORAL at 20:00

## 2022-02-13 RX ADMIN — METOPROLOL TARTRATE 75 MG: 50 TABLET, FILM COATED ORAL at 19:57

## 2022-02-13 RX ADMIN — FOLIC ACID 1 MG: 5 INJECTION, SOLUTION INTRAMUSCULAR; INTRAVENOUS; SUBCUTANEOUS at 12:24

## 2022-02-13 RX ADMIN — DEXMEDETOMIDINE HYDROCHLORIDE 1.4 MCG/KG/HR: 4 INJECTION, SOLUTION INTRAVENOUS at 03:47

## 2022-02-13 RX ADMIN — SODIUM BICARBONATE 650 MG: 650 TABLET ORAL at 17:29

## 2022-02-13 RX ADMIN — INSULIN LISPRO 2 UNITS: 100 INJECTION, SOLUTION INTRAVENOUS; SUBCUTANEOUS at 17:30

## 2022-02-13 RX ADMIN — TICAGRELOR 90 MG: 90 TABLET ORAL at 19:56

## 2022-02-13 RX ADMIN — IBUPROFEN 200 MG: 200 TABLET, FILM COATED ORAL at 22:02

## 2022-02-13 RX ADMIN — INSULIN LISPRO 2 UNITS: 100 INJECTION, SOLUTION INTRAVENOUS; SUBCUTANEOUS at 12:28

## 2022-02-13 RX ADMIN — Medication 1 TABLET: at 08:48

## 2022-02-13 RX ADMIN — DEXMEDETOMIDINE HYDROCHLORIDE 1.4 MCG/KG/HR: 4 INJECTION, SOLUTION INTRAVENOUS at 02:13

## 2022-02-13 RX ADMIN — PHENOBARBITAL 32.4 MG: 32.4 TABLET ORAL at 20:02

## 2022-02-13 RX ADMIN — BUMETANIDE 2 MG: 0.25 INJECTION INTRAMUSCULAR; INTRAVENOUS at 08:48

## 2022-02-13 RX ADMIN — INSULIN LISPRO 2 UNITS: 100 INJECTION, SOLUTION INTRAVENOUS; SUBCUTANEOUS at 19:57

## 2022-02-13 RX ADMIN — DEXMEDETOMIDINE HYDROCHLORIDE 1.4 MCG/KG/HR: 4 INJECTION, SOLUTION INTRAVENOUS at 11:12

## 2022-02-13 RX ADMIN — SODIUM CHLORIDE, PRESERVATIVE FREE 10 ML: 5 INJECTION INTRAVENOUS at 08:50

## 2022-02-13 RX ADMIN — PHENOBARBITAL 32.4 MG: 32.4 TABLET ORAL at 08:53

## 2022-02-13 RX ADMIN — SODIUM BICARBONATE 650 MG: 650 TABLET ORAL at 12:27

## 2022-02-13 RX ADMIN — HYDRALAZINE HYDROCHLORIDE 100 MG: 50 TABLET, FILM COATED ORAL at 12:27

## 2022-02-13 ASSESSMENT — PAIN - FUNCTIONAL ASSESSMENT
PAIN_FUNCTIONAL_ASSESSMENT: ACTIVITIES ARE NOT PREVENTED
PAIN_FUNCTIONAL_ASSESSMENT: ACTIVITIES ARE NOT PREVENTED

## 2022-02-13 ASSESSMENT — PAIN DESCRIPTION - DESCRIPTORS
DESCRIPTORS: SHARP
DESCRIPTORS: SHARP

## 2022-02-13 ASSESSMENT — PAIN DESCRIPTION - PROGRESSION
CLINICAL_PROGRESSION: NOT CHANGED
CLINICAL_PROGRESSION: NOT CHANGED

## 2022-02-13 ASSESSMENT — PAIN SCALES - WONG BAKER

## 2022-02-13 ASSESSMENT — PAIN DESCRIPTION - ONSET
ONSET: ON-GOING
ONSET: ON-GOING

## 2022-02-13 ASSESSMENT — PAIN SCALES - GENERAL
PAINLEVEL_OUTOF10: 7
PAINLEVEL_OUTOF10: 0
PAINLEVEL_OUTOF10: 7
PAINLEVEL_OUTOF10: 5
PAINLEVEL_OUTOF10: 0

## 2022-02-13 ASSESSMENT — PAIN DESCRIPTION - PAIN TYPE
TYPE: ACUTE PAIN
TYPE: ACUTE PAIN

## 2022-02-13 ASSESSMENT — PAIN DESCRIPTION - LOCATION
LOCATION: SHOULDER;ANKLE
LOCATION: SHOULDER

## 2022-02-13 ASSESSMENT — PAIN DESCRIPTION - FREQUENCY
FREQUENCY: CONTINUOUS
FREQUENCY: CONTINUOUS

## 2022-02-13 ASSESSMENT — PAIN DESCRIPTION - ORIENTATION
ORIENTATION: LEFT
ORIENTATION: LEFT

## 2022-02-13 NOTE — PROGRESS NOTES
CRITICAL CARE PROGRESS NOTE      Patient:  Charlene Herbert    Unit/Bed:4B-07/007-A  YOB: 1965  MRN: [id number]   PCP: ISABELLA Enriquez CNP  Date of Admission: 2/7/2022  Chief Complaint:- ETOH withdrawal     Assessment and Plan:      1. Acute alcohol withdrawal: Recent alcohol binge noted.  Patient positive for withdrawal symptoms.  Patient receiving phenobarbital and required Precedex sedation and transferred to the ICU on 2/9.  Monitor.  Significant history of alcohol abuse. 2. Encephalopathy: Secondary to alcohol withdrawal.  Continue phenobarbital and Precedex. Improving mentation   3. Heart failure with reduced ejection fraction: AICD placement, on chronic diuretic 2 mg Bumex daily.  Cardiology consulted due to AICD firing prior to admission. 4. Hypokalemia: Replacement ordered  5. Atrial fibrillation with RVR: Patient on Cardizem drip normally on Coumadin.  Therapeutic INR achieved 2/12. Add lopressor 2/10  6. Mechanical fall: X-rays negative for acute injuries.  Likely secondary to alcohol intoxication.  PT OT when appropriate. 7. Macrocytic anemia: Hemoglobin 12.1, hematocrit 37.7, no active signs of bleeding.  Monitor. 8. Mild hypernatremia: resolved;  Monitor, started oral diet   9. Thrombocytopenia: Platelets 285, monitor closely in the setting of use of Lovenox.  No obvious signs of bleeding.  Stable  10. Chronic kidney disease stage III: Creatinine at baseline.  Monitor. 11. Anion gap metabolic acidosis: Secondary to renal failure. Add bicarbonate tabs. 12. CAD: LAD stent placed 10/19/2020, on Brilinta  13. Diabetes mellitus: Sliding scale insulin, Lantus. 14. Hyperlipidemia: Lipitor 80 mg nightly  15. Hypertension: Resume home antihypertensives.  Wean Cardizem as tolerated.   16. Tobacco abuse: Nicotine patch     INITIAL H AND P AND ICU COURSE:  Zachary Essex is a 35-year-old white male who presented to [facility] on 2/7/2022 with complaints of chest pain after AICD fired. Ela Weston a past medical history of reformed smoker, alcohol abuse, heart failure, anxiety, atrial fibrillation, CAD, cardiomyopathy, CKD, diabetes mellitus, GERD, hyperlipidemia, hypertension, AICD placement.  Per report patient presented to [facility] after chest pain following his AICD firing. [facility] reported that the device and fired 5 times at home on 2/7/2022. [facility] reported that he had chest pain immediately following these events.  He denies chest pain prior. [facility] reports that this is happened to him in the past. [facility] states he has not been taking his medications for the last 5 to 6 days and had been drinking excessively. Ruchi Lo states he had drank 1/5 of apple crown whiskey for the past 5 to 6 days. [facility] was agitated upon presentation. [facility] was initially admitted to the hospitalist service. [facility] home medications were resumed.  Cardiology was consulted for AICD firing.  He developed worsening agitation secondary to withdrawal symptoms on 2/8.  CIWA scale was initiated and phenobarbital Dennis protocol the patient continued to have increased symptoms.  He was transferred to the ICU for further care.  On 2/9 patient continues to require phenobarbital and Precedex.  Patient continues to have withdrawal symptoms.  Monitor for ability to wean sedatives.  Monitor closely for need for intubation.     Past Medical History:  Per HPI  Family History: Mother: diabetes, HTN, stroke Father:   Social History: Reformed smoker, occasional alcohol use, denies drug use     ROS   Unable to review secondary to mental status    Scheduled Meds:   sodium bicarbonate  650 mg Oral 4x Daily    thiamine  100 mg Oral Daily    metoprolol tartrate  75 mg Oral BID    potassium bicarb-citric acid  20 mEq Oral Daily    amiodarone  200 mg Oral Daily    PHENobarbital  260 mg IntraVENous Once    bumetanide  2 mg IntraVENous Daily    PHENobarbital  32.4 mg Oral BID    folic acid  1 mg IntraMUSCular Daily    sodium chloride flush  5-40 mL IntraVENous 2 times per day    multivitamin  1 tablet Oral Daily    atorvastatin  80 mg Oral Nightly    isosorbide mononitrate  120 mg Oral Daily    ticagrelor  90 mg Oral BID    insulin lispro  0-12 Units SubCUTAneous TID     insulin lispro  0-6 Units SubCUTAneous Nightly    hydrALAZINE  100 mg Oral 3 times per day    losartan  50 mg Oral Daily    nicotine  1 patch TransDERmal Q24H    insulin glargine  20 Units SubCUTAneous Nightly    warfarin placeholder: dosing by pharmacy   Other RX Placeholder     Continuous Infusions:   dexmedetomidine 1 mcg/kg/hr (02/13/22 1347)    sodium chloride      dextrose         PHYSICAL EXAMINATION:  T:  98.2.  P:  85. RR:  16. B/P:  137/84. FiO2:  0. O2 Sat:  94.  I/O:  1530  Body mass index is 33.77 kg/m². General:   Acute on chronically ill-appearing white male  HEENT:  normocephalic and atraumatic. No scleral icterus. PERR  Neck: supple. No Thyromegaly. Lungs: clear to auscultation. No retractions  Cardiac: RRR. No JVD. Abdomen: soft. Nontender. Extremities:  No clubbing, cyanosis, or edema x 4. Vasculature: capillary refill < 3 seconds. Palpable dorsalis pedis pulses. Skin:  warm and dry. Psych: Awakes and intermittently follows commands  Lymph:  No supraclavicular adenopathy. Neurologic:  No focal deficit. No seizures. Data: (All radiographs, tracings, PFTs, and imaging are personally viewed and interpreted unless otherwise noted).  Sodium 142, potassium 3.4, chloride 109, CO2 18, BUN 29, creatinine 2.4, anion gap 14.0, glucose 154   WBC 7.0, hemoglobin 12.1, hematocrit 37.7, platelet count 605   Telemetry shows NSR   · KUB 2/9/2022 reports NG tube in the stomach.   · Chest x-ray 2/7/2022 reports no acute process  · Left knee x-ray 2/7/2022 reports no acute abnormality  · Right radial ulnar x-ray 2/7/2022 reports no acute process  · Right humerus x-ray 2/7/2022 reports no acute process  · Renal ultrasound 1/26/2022 reports probable bilateral renal cyst.  Complex lesion of the left kidney malignancy cannot be excluded. .  · EKG 2/7/2022 reports atrial fibrillation with rapid ventricular response  · Echocardiogram 2/8/2022 reports ejection fraction 25 to 30% moderate dilation of left atrium      Meets Continued ICU Level Care Criteria:    [x] Yes   [] No - Transfer Planned to listed location:  [] HOSPITALIST CONTACTED-      Case and plan discussed with Dr. Storm Section        Electronically signed by Saran Alamo.  Subhash Samaniego, APRN - CNP  CRITICAL CARE SPECIALIST

## 2022-02-13 NOTE — PROGRESS NOTES
Clinician attempted AoD consult. Patient asleep. Had to be medicated earlier this morning due to verbally aggressive behavior.

## 2022-02-13 NOTE — PROGRESS NOTES
This RN assumed care at this time, patient resting in bed with sitter at bedside, no needs voiced. Lungs are diminished and breathing is regular and unlabored. Bilateral pedal pulses and PT pulses palpable, as well as bilateral wrist pulses.

## 2022-02-13 NOTE — PROGRESS NOTES
Clinical Pharmacy Note    Warfarin consult follow-up    Recent Labs     02/13/22  0614   INR 3.87*     Recent Labs     02/11/22  0458 02/12/22  0320 02/13/22  0614   HGB 13.4* 13.1* 12.1*   HCT 40.7* 41.8* 37.7*   PLT 86* 97* 105*     Significant Drug-Drug Interactions:  New warfarin drug-drug interactions: None  Discontinued drug-drug interactions: None   Current warfarin drug-drug interactions: ticagrelor (), phenobarbital, amiodarone ()     Date INR Warfarin Dose   2/7/2022 1.05 7.5 mg   2/8/2022 1.02  not given by RN   2/9/2022 1.33 5 mg   2/10/2022 1.40 6 mg   2/11/2022 1.86 4 mg   2/12/2022 2.85 No warfarin   2/13/2022 3.87 No warfarin     Notes:                   PT/INR or POC-INR will be monitored routinely until therapeutic INR is achieved.     Andreia Funes, PharmD  2/13/2022 2:25 PM

## 2022-02-13 NOTE — FLOWSHEET NOTE
1400 Mr Staci Fong continues to rst comfortably in the bed. He seems to be getting much more lucid. The Precedex gtt has been decreased to 1 ug/kg. 1500 His daughter Andria Shearer has come to see.

## 2022-02-13 NOTE — FLOWSHEET NOTE
0768 Mr Yony Valentin rests in the bed this AM. He is uncooperative and impulsive but does not demonstrate destructive behavior. He has a sitter in the room. His IV site right arm is clear. He remains on room air.

## 2022-02-13 NOTE — PLAN OF CARE
Problem: Falls - Risk of:  Goal: Will remain free from falls  Description: Will remain free from falls  2/13/2022 0228 by Rajan Aguillon RN  Outcome: Ongoing  Note: Call light in reach, bed in lowest position, and bed alarm activated. Education given on use of call light before ambulation and when in need of assistance. Patient expressed understanding. Hourly visual checks performed and charted. Toileting offered to patient. No falls this shift, at any time. Arm band and falling star in place. Will continue to monitor. Problem: Falls - Risk of:  Goal: Absence of physical injury  Description: Absence of physical injury  Outcome: Ongoing  Note: Patient remains free of injury this shift. Call light within reach and hourly rounds performed. Problem: VIOLENT RESTRAINTS  Goal: Removal from restraints as soon as assessed to be safe  2/13/2022 0228 by Rajan Aguillon RN  Outcome: Ongoing  Note: Not required at this time     Problem: Non-Violent Restraints  Goal: Removal from restraints as soon as assessed to be safe  2/13/2022 0228 by Rajan Aguillon RN  Outcome: Ongoing  Note: Not required at this time     Problem: Skin Integrity:  Goal: Will show no infection signs and symptoms  Description: Will show no infection signs and symptoms  Outcome: Ongoing  Note: Patient remains free of signs/symptoms of infection this shift, will continue to monitor     Problem: Skin Integrity:  Goal: Absence of new skin breakdown  Description: Absence of new skin breakdown  Outcome: Ongoing  Note: No signs of new skin breakdown with each assessment. Skin remains warm, dry, intact. Mucous membranes pink & moist. Patient is able to turn self. Care plan reviewed with patient. Patient verbalizes understanding of the plan of care and contributed to goal setting.

## 2022-02-14 ENCOUNTER — APPOINTMENT (OUTPATIENT)
Dept: GENERAL RADIOLOGY | Age: 57
DRG: 308 | End: 2022-02-14
Payer: COMMERCIAL

## 2022-02-14 LAB
ANION GAP SERPL CALCULATED.3IONS-SCNC: 13 MEQ/L (ref 8–16)
BUN BLDV-MCNC: 28 MG/DL (ref 7–22)
CALCIUM SERPL-MCNC: 7.8 MG/DL (ref 8.5–10.5)
CHLORIDE BLD-SCNC: 106 MEQ/L (ref 98–111)
CO2: 21 MEQ/L (ref 23–33)
CREAT SERPL-MCNC: 2.4 MG/DL (ref 0.4–1.2)
ERYTHROCYTE [DISTWIDTH] IN BLOOD BY AUTOMATED COUNT: 14.6 % (ref 11.5–14.5)
ERYTHROCYTE [DISTWIDTH] IN BLOOD BY AUTOMATED COUNT: 54.5 FL (ref 35–45)
GFR SERPL CREATININE-BSD FRML MDRD: 28 ML/MIN/1.73M2
GLUCOSE BLD-MCNC: 157 MG/DL (ref 70–108)
GLUCOSE BLD-MCNC: 161 MG/DL (ref 70–108)
GLUCOSE BLD-MCNC: 182 MG/DL (ref 70–108)
GLUCOSE BLD-MCNC: 191 MG/DL (ref 70–108)
HCT VFR BLD CALC: 34.8 % (ref 42–52)
HEMOGLOBIN: 11 GM/DL (ref 14–18)
INR BLD: 3.9 (ref 0.85–1.13)
MCH RBC QN AUTO: 32.8 PG (ref 26–33)
MCHC RBC AUTO-ENTMCNC: 31.6 GM/DL (ref 32.2–35.5)
MCV RBC AUTO: 103.9 FL (ref 80–94)
PLATELET # BLD: 119 THOU/MM3 (ref 130–400)
PMV BLD AUTO: 10.8 FL (ref 9.4–12.4)
POTASSIUM SERPL-SCNC: 3.1 MEQ/L (ref 3.5–5.2)
RBC # BLD: 3.35 MILL/MM3 (ref 4.7–6.1)
SODIUM BLD-SCNC: 140 MEQ/L (ref 135–145)
WBC # BLD: 5.7 THOU/MM3 (ref 4.8–10.8)

## 2022-02-14 PROCEDURE — APPSS180 APP SPLIT SHARED TIME > 60 MINUTES: Performed by: NURSE PRACTITIONER

## 2022-02-14 PROCEDURE — 6360000002 HC RX W HCPCS: Performed by: NURSE PRACTITIONER

## 2022-02-14 PROCEDURE — 6370000000 HC RX 637 (ALT 250 FOR IP): Performed by: NURSE PRACTITIONER

## 2022-02-14 PROCEDURE — 2500000003 HC RX 250 WO HCPCS: Performed by: NURSE PRACTITIONER

## 2022-02-14 PROCEDURE — 85610 PROTHROMBIN TIME: CPT

## 2022-02-14 PROCEDURE — 6370000000 HC RX 637 (ALT 250 FOR IP): Performed by: INTERNAL MEDICINE

## 2022-02-14 PROCEDURE — 6370000000 HC RX 637 (ALT 250 FOR IP): Performed by: FAMILY MEDICINE

## 2022-02-14 PROCEDURE — 82948 REAGENT STRIP/BLOOD GLUCOSE: CPT

## 2022-02-14 PROCEDURE — 2580000003 HC RX 258: Performed by: PHYSICIAN ASSISTANT

## 2022-02-14 PROCEDURE — 6370000000 HC RX 637 (ALT 250 FOR IP): Performed by: HOSPITALIST

## 2022-02-14 PROCEDURE — 99232 SBSQ HOSP IP/OBS MODERATE 35: CPT | Performed by: STUDENT IN AN ORGANIZED HEALTH CARE EDUCATION/TRAINING PROGRAM

## 2022-02-14 PROCEDURE — 80048 BASIC METABOLIC PNL TOTAL CA: CPT

## 2022-02-14 PROCEDURE — 1200000003 HC TELEMETRY R&B

## 2022-02-14 PROCEDURE — 2500000003 HC RX 250 WO HCPCS: Performed by: STUDENT IN AN ORGANIZED HEALTH CARE EDUCATION/TRAINING PROGRAM

## 2022-02-14 PROCEDURE — 99232 SBSQ HOSP IP/OBS MODERATE 35: CPT | Performed by: INTERNAL MEDICINE

## 2022-02-14 PROCEDURE — 6370000000 HC RX 637 (ALT 250 FOR IP): Performed by: STUDENT IN AN ORGANIZED HEALTH CARE EDUCATION/TRAINING PROGRAM

## 2022-02-14 PROCEDURE — 36415 COLL VENOUS BLD VENIPUNCTURE: CPT

## 2022-02-14 PROCEDURE — 85027 COMPLETE CBC AUTOMATED: CPT

## 2022-02-14 PROCEDURE — 73610 X-RAY EXAM OF ANKLE: CPT

## 2022-02-14 PROCEDURE — 6370000000 HC RX 637 (ALT 250 FOR IP)

## 2022-02-14 PROCEDURE — 6370000000 HC RX 637 (ALT 250 FOR IP): Performed by: PHYSICIAN ASSISTANT

## 2022-02-14 RX ORDER — DILTIAZEM HYDROCHLORIDE 5 MG/ML
20 INJECTION INTRAVENOUS ONCE
Status: DISCONTINUED | OUTPATIENT
Start: 2022-02-14 | End: 2022-02-15

## 2022-02-14 RX ORDER — TRAMADOL HYDROCHLORIDE 50 MG/1
50 TABLET ORAL EVERY 6 HOURS PRN
Status: DISCONTINUED | OUTPATIENT
Start: 2022-02-14 | End: 2022-02-17 | Stop reason: HOSPADM

## 2022-02-14 RX ORDER — HYDROCODONE BITARTRATE AND ACETAMINOPHEN 5; 325 MG/1; MG/1
1 TABLET ORAL EVERY 6 HOURS PRN
Status: DISCONTINUED | OUTPATIENT
Start: 2022-02-14 | End: 2022-02-14

## 2022-02-14 RX ORDER — OXYCODONE HYDROCHLORIDE 5 MG/1
10 TABLET ORAL EVERY 4 HOURS PRN
Status: DISCONTINUED | OUTPATIENT
Start: 2022-02-14 | End: 2022-02-14

## 2022-02-14 RX ORDER — IBUPROFEN 200 MG
200 TABLET ORAL ONCE
Status: COMPLETED | OUTPATIENT
Start: 2022-02-14 | End: 2022-02-14

## 2022-02-14 RX ORDER — WARFARIN SODIUM 1 MG/1
1 TABLET ORAL
Status: COMPLETED | OUTPATIENT
Start: 2022-02-14 | End: 2022-02-14

## 2022-02-14 RX ORDER — POTASSIUM CHLORIDE 20 MEQ/1
20 TABLET, EXTENDED RELEASE ORAL ONCE
Status: COMPLETED | OUTPATIENT
Start: 2022-02-14 | End: 2022-02-14

## 2022-02-14 RX ORDER — METOPROLOL TARTRATE 100 MG/1
100 TABLET ORAL 2 TIMES DAILY
Status: DISCONTINUED | OUTPATIENT
Start: 2022-02-14 | End: 2022-02-17 | Stop reason: HOSPADM

## 2022-02-14 RX ORDER — ALPRAZOLAM 0.5 MG/1
0.5 TABLET ORAL 3 TIMES DAILY PRN
Status: DISCONTINUED | OUTPATIENT
Start: 2022-02-14 | End: 2022-02-17 | Stop reason: HOSPADM

## 2022-02-14 RX ORDER — PHENOBARBITAL SODIUM 65 MG/ML
32.4 INJECTION INTRAMUSCULAR 2 TIMES DAILY
Status: COMPLETED | OUTPATIENT
Start: 2022-02-14 | End: 2022-02-15

## 2022-02-14 RX ORDER — TRAMADOL HYDROCHLORIDE 50 MG/1
100 TABLET ORAL EVERY 6 HOURS PRN
Status: DISCONTINUED | OUTPATIENT
Start: 2022-02-14 | End: 2022-02-17 | Stop reason: HOSPADM

## 2022-02-14 RX ORDER — OXYCODONE HYDROCHLORIDE 5 MG/1
5 TABLET ORAL EVERY 4 HOURS PRN
Status: DISCONTINUED | OUTPATIENT
Start: 2022-02-14 | End: 2022-02-14

## 2022-02-14 RX ADMIN — Medication 1 TABLET: at 08:40

## 2022-02-14 RX ADMIN — ATORVASTATIN CALCIUM 80 MG: 80 TABLET, FILM COATED ORAL at 21:27

## 2022-02-14 RX ADMIN — HYDRALAZINE HYDROCHLORIDE 100 MG: 50 TABLET, FILM COATED ORAL at 22:16

## 2022-02-14 RX ADMIN — INSULIN GLARGINE 20 UNITS: 100 INJECTION, SOLUTION SUBCUTANEOUS at 21:28

## 2022-02-14 RX ADMIN — POTASSIUM BICARBONATE 20 MEQ: 782 TABLET, EFFERVESCENT ORAL at 08:39

## 2022-02-14 RX ADMIN — ALPRAZOLAM 0.5 MG: 0.5 TABLET ORAL at 16:53

## 2022-02-14 RX ADMIN — TICAGRELOR 90 MG: 90 TABLET ORAL at 08:44

## 2022-02-14 RX ADMIN — HYDRALAZINE HYDROCHLORIDE 100 MG: 50 TABLET, FILM COATED ORAL at 12:54

## 2022-02-14 RX ADMIN — IBUPROFEN 200 MG: 200 TABLET, FILM COATED ORAL at 06:31

## 2022-02-14 RX ADMIN — POTASSIUM CHLORIDE 10 MEQ: 7.46 INJECTION, SOLUTION INTRAVENOUS at 08:00

## 2022-02-14 RX ADMIN — TRAMADOL HYDROCHLORIDE 100 MG: 50 TABLET, COATED ORAL at 22:17

## 2022-02-14 RX ADMIN — SODIUM BICARBONATE 650 MG: 650 TABLET ORAL at 08:40

## 2022-02-14 RX ADMIN — WARFARIN SODIUM 1 MG: 1 TABLET ORAL at 18:39

## 2022-02-14 RX ADMIN — LOSARTAN POTASSIUM 50 MG: 50 TABLET, FILM COATED ORAL at 08:42

## 2022-02-14 RX ADMIN — HYDROCODONE BITARTRATE AND ACETAMINOPHEN 1 TABLET: 5; 325 TABLET ORAL at 12:07

## 2022-02-14 RX ADMIN — PHENOBARBITAL SODIUM 32.4 MG: 65 INJECTION INTRAMUSCULAR; INTRAVENOUS at 22:17

## 2022-02-14 RX ADMIN — POTASSIUM CHLORIDE 10 MEQ: 7.46 INJECTION, SOLUTION INTRAVENOUS at 06:07

## 2022-02-14 RX ADMIN — DEXMEDETOMIDINE HYDROCHLORIDE 1 MCG/KG/HR: 4 INJECTION, SOLUTION INTRAVENOUS at 01:49

## 2022-02-14 RX ADMIN — PHENOBARBITAL 32.4 MG: 32.4 TABLET ORAL at 08:39

## 2022-02-14 RX ADMIN — Medication 100 MG: at 08:38

## 2022-02-14 RX ADMIN — SODIUM BICARBONATE 650 MG: 650 TABLET ORAL at 12:54

## 2022-02-14 RX ADMIN — DEXMEDETOMIDINE HYDROCHLORIDE 1 MCG/KG/HR: 4 INJECTION, SOLUTION INTRAVENOUS at 05:13

## 2022-02-14 RX ADMIN — INSULIN LISPRO 1 UNITS: 100 INJECTION, SOLUTION INTRAVENOUS; SUBCUTANEOUS at 21:28

## 2022-02-14 RX ADMIN — TICAGRELOR 90 MG: 90 TABLET ORAL at 21:27

## 2022-02-14 RX ADMIN — BENZOCAINE AND MENTHOL 1 LOZENGE: 15; 3.6 LOZENGE ORAL at 01:08

## 2022-02-14 RX ADMIN — INSULIN LISPRO 2 UNITS: 100 INJECTION, SOLUTION INTRAVENOUS; SUBCUTANEOUS at 17:34

## 2022-02-14 RX ADMIN — INSULIN LISPRO 2 UNITS: 100 INJECTION, SOLUTION INTRAVENOUS; SUBCUTANEOUS at 13:06

## 2022-02-14 RX ADMIN — POTASSIUM CHLORIDE 20 MEQ: 1500 TABLET, EXTENDED RELEASE ORAL at 10:41

## 2022-02-14 RX ADMIN — SODIUM CHLORIDE, PRESERVATIVE FREE 10 ML: 5 INJECTION INTRAVENOUS at 12:47

## 2022-02-14 RX ADMIN — HYDRALAZINE HYDROCHLORIDE 100 MG: 50 TABLET, FILM COATED ORAL at 06:13

## 2022-02-14 RX ADMIN — AMIODARONE HYDROCHLORIDE 200 MG: 200 TABLET ORAL at 08:41

## 2022-02-14 RX ADMIN — METOPROLOL 100 MG: 100 TABLET ORAL at 21:27

## 2022-02-14 RX ADMIN — FOLIC ACID 1 MG: 5 INJECTION, SOLUTION INTRAMUSCULAR; INTRAVENOUS; SUBCUTANEOUS at 09:05

## 2022-02-14 RX ADMIN — BUMETANIDE 2 MG: 0.25 INJECTION INTRAMUSCULAR; INTRAVENOUS at 09:00

## 2022-02-14 RX ADMIN — METOPROLOL TARTRATE 75 MG: 50 TABLET, FILM COATED ORAL at 08:44

## 2022-02-14 RX ADMIN — ISOSORBIDE MONONITRATE 120 MG: 60 TABLET, EXTENDED RELEASE ORAL at 08:41

## 2022-02-14 RX ADMIN — SODIUM BICARBONATE 650 MG: 650 TABLET ORAL at 21:27

## 2022-02-14 RX ADMIN — INSULIN LISPRO 2 UNITS: 100 INJECTION, SOLUTION INTRAVENOUS; SUBCUTANEOUS at 08:50

## 2022-02-14 RX ADMIN — SODIUM BICARBONATE 650 MG: 650 TABLET ORAL at 18:16

## 2022-02-14 ASSESSMENT — ENCOUNTER SYMPTOMS
NAUSEA: 0
CHEST TIGHTNESS: 0
EYE REDNESS: 0
PHOTOPHOBIA: 0
BACK PAIN: 0
ABDOMINAL DISTENTION: 0
COLOR CHANGE: 0
WHEEZING: 0
SORE THROAT: 0
VOMITING: 0
SHORTNESS OF BREATH: 0
TROUBLE SWALLOWING: 1

## 2022-02-14 ASSESSMENT — PAIN DESCRIPTION - DESCRIPTORS: DESCRIPTORS: SHARP

## 2022-02-14 ASSESSMENT — PAIN SCALES - GENERAL
PAINLEVEL_OUTOF10: 8
PAINLEVEL_OUTOF10: 8
PAINLEVEL_OUTOF10: 9
PAINLEVEL_OUTOF10: 0
PAINLEVEL_OUTOF10: 0

## 2022-02-14 ASSESSMENT — PAIN DESCRIPTION - PROGRESSION: CLINICAL_PROGRESSION: NOT CHANGED

## 2022-02-14 ASSESSMENT — PAIN DESCRIPTION - ONSET: ONSET: ON-GOING

## 2022-02-14 ASSESSMENT — PAIN DESCRIPTION - ORIENTATION: ORIENTATION: RIGHT

## 2022-02-14 ASSESSMENT — PAIN DESCRIPTION - LOCATION: LOCATION: ANKLE

## 2022-02-14 ASSESSMENT — PAIN DESCRIPTION - FREQUENCY: FREQUENCY: CONTINUOUS

## 2022-02-14 NOTE — FLOWSHEET NOTE
5860 Mr Raúl Reeves was assisted up to the chair. He tolerates this poorly as he has complaints of right foot pain. Will get foot xray. Plan to transfer to stepdown.

## 2022-02-14 NOTE — FLOWSHEET NOTE
46 Mr Maylin Finney rests in the bed without apparent distress. He appears much more lucid this AM. The precedex gtt will be weaned off. He takes directions well. He has complaints of right foot pain from a fall he had earlier this month. 0830 He tolerates PO well. Able to eat breakfast and take his PO meds without difficulty.

## 2022-02-14 NOTE — PROGRESS NOTES
CRITICAL CARE PROGRESS NOTE      Patient:  Kyle Donovan    Unit/Bed:4B-07/007-A  YOB: 1965  MRN: [id number]   PCP: ISABELLA Tsai CNP  Date of Admission: 2/7/2022  Chief Complaint:- right ankle pain    Assessment and Plan:      Acute alcohol withdrawal: Improved; recent alcohol binge noted. Patient positive for withdrawal symptoms. Patient receiving phenobarbital and required Precedex sedation and transferred to the ICU on 2/9. Precedex stopped 2/14 patient alert and oriented. Encephalopathy: Resolved; secondary to alcohol withdrawal. S/p Precedex. Improving mentation   Heart failure with reduced ejection fraction: AICD placement, on chronic diuretic 2 mg Bumex daily. Cardiology consulted due to AICD firing prior to admission. Hypokalemia: Replacement ordered  Right ankle pain: X-rays pending patient did have mechanical fall prior to arrival  Atrial fibrillation with RVR: Patient on Cardizem drip normally on Coumadin. Therapeutic INR achieved 2/12. Add lopressor 2/10. Rate controlled at this time  Mechanical fall: X-rays negative for acute injuries. Likely secondary to alcohol intoxication. PT OT when appropriate. Macrocytic anemia: Hemoglobin 11.0, hematocrit 34.8, no active signs of bleeding. Monitor. Mild hypernatremia: resolved;  Monitor, started oral diet   Thrombocytopenia: Platelets 322, monitor closely in the setting of use of Lovenox. No obvious signs of bleeding. Stable  Chronic kidney disease stage III: Creatinine at baseline. Monitor. Anion gap metabolic acidosis: Secondary to renal failure. Add bicarbonate tabs. CAD: LAD stent placed 10/19/2020, on Brilinta  Diabetes mellitus: Sliding scale insulin, Lantus. Hyperlipidemia: Lipitor 80 mg nightly  Hypertension: Resume home antihypertensives. Wean Cardizem as tolerated.   Tobacco abuse: Nicotine patch     INITIAL H AND P AND ICU COURSE:  Rosa Carbone is a 63-year-old white male who presented to [facility] [address] on 2/7/2022 with complaints of chest pain after AICD fired. Has a past medical history of reformed smoker, alcohol abuse, heart failure, anxiety, atrial fibrillation, CAD, cardiomyopathy, CKD, diabetes mellitus, GERD, hyperlipidemia, hypertension, AICD placement. Per report patient presented to [facility] after chest pain following his AICD firing. He reported that the device and fired 5 times at home on 2/7/2022. He reported that he had chest pain immediately following these events. He denies chest pain prior. He reports that this is happened to him in the past.  He states he has not been taking his medications for the last 5 to 6 days and had been drinking excessively. Patient states he had drank 1/5 of apple crown whiskey for the past 5 to 6 days. He was agitated upon presentation. He was initially admitted to the hospitalist service. His home medications were resumed. Cardiology was consulted for AICD firing. He developed worsening agitation secondary to withdrawal symptoms on 2/8. CIWA scale was initiated and phenobarbital Dennis protocol the patient continued to have increased symptoms. He was transferred to the ICU for further care. On 2/9 patient continues to require phenobarbital and Precedex. Patient continues to have withdrawal symptoms. Monitor for ability to wean sedatives. Monitor closely for need for intubation. Past Medical History:  Per HPI  Family History: Mother: diabetes, HTN, stroke Father:   Social History: Reformed smoker, occasional alcohol use, denies drug use    Review of Systems   Constitutional: Negative for fatigue and fever. HENT: Positive for trouble swallowing. Negative for sore throat. Eyes: Negative for photophobia and redness. Respiratory: Negative for chest tightness, shortness of breath and wheezing. Cardiovascular: Negative for chest pain and leg swelling.    Gastrointestinal: Negative for abdominal distention, nausea and vomiting. Endocrine: Negative for polydipsia and polyphagia. Genitourinary: Negative for decreased urine volume and flank pain. Musculoskeletal: Negative for back pain and neck pain. Skin: Negative for color change, pallor and rash. Allergic/Immunologic: Negative for food allergies. Neurological: Negative for dizziness, weakness and headaches. Hematological: Negative for adenopathy. Psychiatric/Behavioral: Negative for agitation and confusion. The patient is not nervous/anxious. Scheduled Meds:   potassium chloride  20 mEq Oral Once    sodium bicarbonate  650 mg Oral 4x Daily    thiamine  100 mg Oral Daily    metoprolol tartrate  75 mg Oral BID    potassium bicarb-citric acid  20 mEq Oral Daily    amiodarone  200 mg Oral Daily    PHENobarbital  260 mg IntraVENous Once    bumetanide  2 mg IntraVENous Daily    PHENobarbital  32.4 mg Oral BID    folic acid  1 mg IntraMUSCular Daily    sodium chloride flush  5-40 mL IntraVENous 2 times per day    multivitamin  1 tablet Oral Daily    atorvastatin  80 mg Oral Nightly    isosorbide mononitrate  120 mg Oral Daily    ticagrelor  90 mg Oral BID    insulin lispro  0-12 Units SubCUTAneous TID WC    insulin lispro  0-6 Units SubCUTAneous Nightly    hydrALAZINE  100 mg Oral 3 times per day    losartan  50 mg Oral Daily    nicotine  1 patch TransDERmal Q24H    insulin glargine  20 Units SubCUTAneous Nightly    warfarin placeholder: dosing by pharmacy   Other RX Placeholder     Continuous Infusions:   dexmedetomidine 0.8 mcg/kg/hr (02/14/22 0514)    sodium chloride      dextrose         PHYSICAL EXAMINATION:  T:  98.4. P:  94. RR:  16. B/P:  121/59. FiO2:  0. O2 Sat:  93.  I/O:  1450/875  Body mass index is 33.77 kg/m². General:   Acute on chronically ill-appearing white male  HEENT:  normocephalic and atraumatic. No scleral icterus. PERR  Neck: supple. No Thyromegaly. Lungs: Unlabored. No retractions  Cardiac: Atrial fibrillation.   No JVD.  Abdomen: soft. Nontender. Extremities:  No clubbing, cyanosis, or edema x 4. Vasculature: capillary refill < 3 seconds. Palpable dorsalis pedis pulses. Skin:  warm and dry. Psych:  Alert and oriented x3. Affect appropriate  Lymph:  No supraclavicular adenopathy. Neurologic:  No focal deficit. No seizures. Data: (All radiographs, tracings, PFTs, and imaging are personally viewed and interpreted unless otherwise noted). Sodium 140, potassium 3.1, chloride 106, CO2 21, BUN 20, creatinine 2.4, anion gap 13.0, glucose 161. WBC 5.7, hemoglobin 1.0, hematocrit 34.8, platelet count 779  Telemetry shows atrial fibrillation  KUB 2/9/2022 reports NG tube in the stomach. Chest x-ray 2/7/2022 reports no acute process  Left knee x-ray 2/7/2022 reports no acute abnormality  Right radial ulnar x-ray 2/7/2022 reports no acute process  Right humerus x-ray 2/7/2022 reports no acute process  Renal ultrasound 1/26/2022 reports probable bilateral renal cyst.  Complex lesion of the left kidney malignancy cannot be excluded. .  EKG 2/7/2022 reports atrial fibrillation with rapid ventricular response  Echocardiogram 2/8/2022 reports ejection fraction 25 to 30% moderate dilation of left atrium  Right ankle x-ray pending       Meets Continued ICU Level Care Criteria:    [] Yes   [x] No - Transfer Planned to listed location: Awaiting bed location  [x] HOSPITALIST CONTACTED-      Case and plan discussed with Dr. Dejan Veloz. Electronically signed by Katy Noriega. ISABELLA Swift - [facility]  CRITICAL CARE SPECIALIST  Patient seen by me including key components of medical care. Case discussed with nurse practitioner. Patient has completed delirium tremens. In addition to medical floor. Italicized font, if present,  represents changes to the note made by me. CC time 25 minutes. Time was discontiguous. Time does not include procedure. Time does include my direct assessment of the patient and coordination of care.   Time represents more than 50% of the time involved with patient care by the [address] [place] [address] team.  Electronically signed by Jake Hamm MD.

## 2022-02-14 NOTE — PLAN OF CARE
Problem: Falls - Risk of:  Goal: Will remain free from falls  Description: Will remain free from falls  2/13/2022 2127 by Horace Lawler RN  Outcome: Met This Shift  Note: Patient remains free from falls at this time. Bed is locked in lowest position with alarm on and side rails up. Call light and personal belongings are within reach. \"Call, don't fall\" policy explained. Fall wristband and nonslip socks are on patient and fall risk sign is posted. Sitter at bedside for additional safety. Problem: Falls - Risk of:  Goal: Absence of physical injury  Description: Absence of physical injury  2/13/2022 2127 by Horace Lawler RN  Outcome: Met This Shift  Note: Patient remains free from physical injury at this time. Safety precautions explained and in place per [facility] policy. Sitter at bedside for additional safety. Problem: Skin Integrity:  Goal: Absence of new skin breakdown  Description: Absence of new skin breakdown  Outcome: Met This Shift  Note: Pt shows no s/s of new skin breakdown at this time. Pt turns himself regularly in bed. Will continue to monitor. Problem: Skin Integrity:  Goal: Will show no infection signs and symptoms  Description: Will show no infection signs and symptoms  2/13/2022 2127 by Horace Lawler RN  Outcome: Ongoing  Note: Pt is in isolation for MRSA, but has no active s/s of infection at this time. Will continue to monitor. Problem: Pain:  Goal: Pain level will decrease  Description: Pain level will decrease  Outcome: Ongoing  Note: Pt is complaining of pain in L shoulder, ankle, and hip. He thinks this pain is likely from his fall prior to admission. Managing pain with PRN tylenol q4h and a one time dose of advil. Problem: Pain:  Goal: Control of acute pain  Description: Control of acute pain  Outcome: Ongoing     Problem: Pain:  Goal: Control of chronic pain  Description: Control of chronic pain  Outcome: Ongoing     Care plan reviewed with patient. Patient verbalizes understanding of the plan of care and contributes to goal setting.

## 2022-02-14 NOTE — PROGRESS NOTES
Clinical Pharmacy Note    Warfarin consult follow-up    Recent Labs     02/14/22  0443   INR 3.90*     Recent Labs     02/12/22  0320 02/13/22  0614 02/14/22  0443   HGB 13.1* 12.1* 11.0*   HCT 41.8* 37.7* 34.8*   PLT 97* 105* 119*     Significant Drug-Drug Interactions:  New warfarin drug-drug interactions: None  Discontinued drug-drug interactions: None   Current warfarin drug-drug interactions: ticagrelor (), phenobarbital,     amiodarone ()     Date INR Warfarin Dose   2/7/2022 1.05 7.5 mg   2/8/2022 1.02  not given by RN   2/9/2022 1.33 5 mg   2/10/2022 1.40 6 mg   2/11/2022 1.86 4 mg   2/12/2022 2.85 No warfarin   2/13/2022 3.87 No warfarin   2/14/2022 3.90 1 mg     Notes:                   PT/INR or POC-INR will be monitored routinely until therapeutic INR is achieved.     Leonor Escamilla PharmD  2/14/2022 11:33 AM

## 2022-02-14 NOTE — PROGRESS NOTES
Cardiology Progress Note      Patient:  Rosalba Merrill  YOB: 1965  MRN: [id number]   Acct: [de-identified]  Admit Date:  2/7/2022  Primary Cardiologist: Josie Tavares MD  Note per dr Bermudez Rater for Consultation:  ICD shock        History Of Present Illness:    64 y. o. male CAD status post PCI, severe LV dysfunction status post ICD, atrial fibrillation, alcohol abuse, diabetes, hypertension, hyperlipidemia who presented to the hospital after ICD shock.  The ICD shocked him 6 times on 2/7/2022. Nicolas Vega is not oriented currently in ICU most of the history was obtained from the chart.  Per history patient has been drinking excessively at home.  In the hospital CIWA scale was initiated in phenobarbital protocol was started.  Cardiology was consulted for ICD shocks.  Interrogation of the device showed atrial fibrillation with RVR with heart rates in excess of 200 bpm.  No ventricular tachycardia or fibrillation.  Echocardiogram on 2/8/2022 showed ejection fraction of 25 to 30% with moderately dilated LA.  Creatinine is 2.0, potassium is 3.4, H&H is 12/36\"    Subjective (Events in last 24 hours):   Pt awake, alert and oriented to person, place. NAD. Feeling okay. Denies CP, SOB. D/w patient about HR and medication adjustments. Importance of taking meds when he leaves the hospital to help with HR and other health concerns. Patient expressed understanding and concern for AICD shocks again.      Objective:   BP (!) 121/59   Pulse 94   Temp 98.4 °F (36.9 °C) (Oral)   Resp 16   Ht 6' 2\" (1.88 m)   Wt 263 lb 0.1 oz (119.3 kg)   SpO2 93%   BMI 33.77 kg/m²      Vss, BP stable  TELEMETRY: afib cvr/rvr, 90s-110  Physical Exam:  General Appearance: ill appearing, alert and oriented to person, place and time, in no acute distress  Cardiovascular: normal/fast rate, irregular rhythm, normal S1 and S2, no murmurs, rubs, clicks, or gallops, distal pulses intact, no carotid bruits, no JVD  Pulmonary/Chest: clear to auscultation bilaterally- no wheezes, rales or rhonchi, normal air movement, no respiratory distress  Abdomen: soft, non-tender, non-distended, normal bowel sounds, no masses   Extremities: no cyanosis, clubbing or edema, ecchymosis of left medial arm/forearm, + peripheral pulses   Skin: warm and dry, no rash or erythema  Neurological: alert, oriented, normal speech, no focal findings or movement disorder noted    Medications:    potassium chloride  20 mEq Oral Once    sodium bicarbonate  650 mg Oral 4x Daily    thiamine  100 mg Oral Daily    metoprolol tartrate  75 mg Oral BID    potassium bicarb-citric acid  20 mEq Oral Daily    amiodarone  200 mg Oral Daily    PHENobarbital  260 mg IntraVENous Once    bumetanide  2 mg IntraVENous Daily    PHENobarbital  32.4 mg Oral BID    folic acid  1 mg IntraMUSCular Daily    sodium chloride flush  5-40 mL IntraVENous 2 times per day    multivitamin  1 tablet Oral Daily    atorvastatin  80 mg Oral Nightly    isosorbide mononitrate  120 mg Oral Daily    ticagrelor  90 mg Oral BID    insulin lispro  0-12 Units SubCUTAneous TID WC    insulin lispro  0-6 Units SubCUTAneous Nightly    hydrALAZINE  100 mg Oral 3 times per day    losartan  50 mg Oral Daily    nicotine  1 patch TransDERmal Q24H    insulin glargine  20 Units SubCUTAneous Nightly    warfarin placeholder: dosing by pharmacy   Other RX Placeholder      dexmedetomidine 0.8 mcg/kg/hr (02/14/22 0514)    sodium chloride      dextrose       benzocaine-menthol, 1 lozenge, Q2H PRN  acetaminophen, 650 mg, Q4H PRN   Or  acetaminophen, 650 mg, Q4H PRN  potassium chloride, 10 mEq, PRN  LORazepam, 2 mg, Q4H PRN  hydrALAZINE, 10 mg, Q6H PRN  sodium chloride flush, 5-40 mL, PRN  sodium chloride, 25 mL, PRN  ondansetron, 4 mg, Q8H PRN   Or  ondansetron, 4 mg, Q6H PRN  polyethylene glycol, 17 g, Daily PRN  nitroGLYCERIN, 0.4 mg, Q5 Min PRN  glucose, 15 g, PRN  glucagon (rDNA), 1 mg, PRN  dextrose, 100 mL/hr, Seldinger technique under  fluoroscopic guidance and ultrasound guidance, I was able to insert a  6-[place] sheath into the right femoral artery.  Thereafter, diagnostic  coronary angiography was performed using a JL-4.     CORONARY ANGIOGRAM:  LEFT MAIN:  Patent without any significant obstruction.     LAD:  About 50% stenosis in the mid LAD.  The distal LAD has mild  luminal irregularities, but no significant obstruction.  There is about  90% diagonal stenosis seen in the proximal aspect of the diagonal.     LCX:  There is a previously placed stent in the proximal LCX that is  patent.     RCA:  .  Diffusely diseased and there is about 70% to 80% stenosis  proximally and about 90% stenosis in the mid segment.     BYPASS ANGIOGRAPHY:  SVG TO RCA:  Ostium, body, anastomosis of the SVG to RCA are patent. RCA beyond the anastomosis has mild luminal irregularities, but no  significant obstruction. [place] is dominant.     MICHAEL TO OM:  Ostium, body, and anastomosis of the LIMA to OM are patent. There is no significant disease beyond the OM.  The OM again is  perfusing in the antegrade direction.     INTERVENTION:  Given the findings and presentation, I elected to proceed  with intervention.  It was clear that the patient is having VT, but it  is unclear which vessels were.  The LAD appeared to have 50% stenosis  and the diagonal branch also had 90% stenosis.  At this point, likely  the diagonal branch is causing some degree of VT, but I want to make  sure that LAD was not ischemic.  Therefore, I chose to proceed with FFR  of the LAD prior to proceeding with intervention given that I will need  to potentially bring the stent back into the ostium of the diagonal  branch which would make stenting of the LAD difficult.  I exchanged out  for a EBU 3.75 guiding catheter, 6-[place].  I cannulated the left main  without any complication.  Heparin IV was given.   ACT was confirmed to  be above 250 seconds.  I wired the LAD using a FFR wire.  IC  nitroglycerin was given.  IV adenosine was infused.  Three minutes after  FFR adenosine infusion the FFR was 0.76.  Given these findings, I  elected to proceed with intervention.  I stented the LAD using a 25 cm  Xience Natali GENNARO.  Postdilated the stent with a 4.0 x 15 NC balloon up  to 12 to 24 atmospheres.  Post-PCI angiography demonstrated JESÚS-3 flow  without any perforation, dissection, distal embolization, side branch  loss, or guide or guidewire-induced trauma.  I re-wired and took the FFR  wire out, re-wired the diagonal branch and then stented the diagonal  branch with a 2.5 x 23 Xience Natali GENNARO at 12 atmospheres.  I  postdilated the stent with 3.0 x 15 NC balloon up to 20 atmospheres.  I  then passed a 2.5 x [address] in an overlapping fashion  distal to the first stent deployed at 9 atmospheres.  I postdilated the  stent with a 3.0 NC balloon up to 24 atmospheres.  Post-PCI angiography  demonstrated JESÚS-3 flow without any perforation, dissection, distal  embolization, side branch loss, or guide or guidewire-induced trauma. All equipments were removed from the patient.  The patient tolerated the  procedure well.     IMMEDIATE COMPLICATIONS:  None.     MEDICATIONS:  See EMR.     ESTIMATED BLOOD LOSS:  Less than 50 mL.     SUMMARY:  Successful FFR-guided PCI of the LAD with one drug-eluting stent; successful PCI of the diagonal branch.     PLAN:  1.  Bedrest.  2.  IV fluids. 3.  Overnight observation. 4.  Guideline-directed therapy for CAD. 5.  TTE. 6.  Followup with myself in one to two weeks postprocedure.     All the above were explained to the patient and the patient's family. They are agreeable and amenable to the plan.     Eder Cole MD   D: 10/22/2020 15:44:02    Lab Data:    Cardiac Enzymes:  No results for input(s): CKTOTAL, CKMB, CKMBINDEX, TROPONINI in the last 72 hours.     CBC:   Lab Results   Component Value Date    WBC 5.7 02/14/2022    RBC 3.35 02/14/2022    RBC 4.75 11/03/2011    HGB 11.0 02/14/2022    HCT 34.8 02/14/2022     02/14/2022       CMP:    Lab Results   Component Value Date     02/14/2022    K 3.1 02/14/2022    K 3.3 02/10/2022     02/14/2022    CO2 21 02/14/2022    BUN 28 02/14/2022    CREATININE 2.4 02/14/2022    LABGLOM 28 02/14/2022    GLUCOSE 161 02/14/2022    CALCIUM 7.8 02/14/2022       Hepatic Function Panel:    Lab Results   Component Value Date    ALKPHOS 99 02/08/2022    ALT 20 02/08/2022    AST 30 02/08/2022    PROT 5.8 02/08/2022    BILITOT 0.9 02/08/2022    BILIDIR <0.2 02/07/2022    LABALBU 3.2 02/08/2022       Magnesium:    Lab Results   Component Value Date    MG 2.2 02/11/2022       PT/INR:    Lab Results   Component Value Date    INR 3.90 02/14/2022       HgBA1c:    Lab Results   Component Value Date    LABA1C 7.0 02/07/2022       FLP:    Lab Results   Component Value Date    TRIG 134 11/12/2021    HDL 48 11/12/2021    LDLCALC 73 11/12/2021       TSH:    Lab Results   Component Value Date    TSH 5.750 02/07/2022         Assessment:  AICD shock--from afib  Persistent longstanding afib cvr--episodes of rvr  CAD s/p prior CABG, PCI  Severe CDMP  EF 25-30% s/p AICD  HTN-well controlled   DM  HLD  ETOH abuse/withdrawal--per ICU  CKD    INR 3.90. Coumadin dosing per pharmacy. Plan:  Blood pressures have been stable, mostly 120s-130s/80s-90s recently. HR has been 80s-110, afib, higher while awake and sitting. Continue amio 200 mg daily. Will increase lopressor to 100 mg daily. Hold for BP <100. Hold for HR <60. Will continue to monitor for adjustment in meds for HR. Continue BP meds as tolerated. Will need coumadin continued per pharmacy. F/u with Dr Ethelyn Donate 3-4 weeks.  (no need for 02/17 appt)   Electronically signed by Camille Britt PA-C on 2/14/2022 at 9:51 AM

## 2022-02-14 NOTE — PROGRESS NOTES
1008: Attempted to complete AOD consult. Patient not in room at time of attempt. MAAME to re-attempt.

## 2022-02-14 NOTE — PLAN OF CARE
Problem: Falls - Risk of:  Goal: Will remain free from falls  Description: Will remain free from falls  2/14/2022 1023 by Wayne Reza RN  Outcome: Ongoing  Note: Up with assist. He has pain to his right foot and is unable to bear weight without support. Problem: Skin Integrity:  Goal: Will show no infection signs and symptoms  Description: Will show no infection signs and symptoms  2/14/2022 1023 by Wayne Reza RN  Outcome: Ongoing  Note: Frequent position change. Problem: Pain:  Goal: Pain level will decrease  Description: Pain level will decrease  2/14/2022 1023 by Wayne Reza RN  Outcome: Ongoing  Note: He feels his pain is not well controlled wit the medical therapy we are currently offering him  Care plan reviewed with patient and family. Patient and famiy verbalize understanding of the plan of care and contribute to goal setting.

## 2022-02-14 NOTE — CARE COORDINATION
2/14/22, 1:27 PM EST    DISCHARGE ON GOING EVALUATION    José Miguel Long day: 7  Location: [facility]07/007-A Reason for admit: Alcohol abuse [F10.10]  Atrial fibrillation with rapid ventricular response ([address] [I48.91]  Atrial fibrillation with RVR ([address] [I48.91]  AICD discharge [Z45.02]   Procedure:   2/12 CT Head WO Contrast    Impression:           1. Stable CT scan of the brain, no interval change since remote study dated 14 December 2018. 2. MRI scan may be helpful for better evaluation if clinically indicated. .        Barriers to Discharge: Addiction Service consult, K+ 3.1, Cardiology following. IV Bumex, diabetes management, IV Hydralazine prn, electrolyte replacement protocol, Phenobarbital protocol for ETOH withdrawal. Brilinta, Coumadin with Pharmacy dosing. INR 3.90. PCP: ISABELLA Lyon CNP  Readmission Risk Score: 20.1 ( )%  Patient Goals/Plan/Treatment Preferences: From home. Will follow clinical course for potential needs and or services.

## 2022-02-15 LAB
ANION GAP SERPL CALCULATED.3IONS-SCNC: 13 MEQ/L (ref 8–16)
BUN BLDV-MCNC: 30 MG/DL (ref 7–22)
CALCIUM SERPL-MCNC: 7.4 MG/DL (ref 8.5–10.5)
CHLORIDE BLD-SCNC: 108 MEQ/L (ref 98–111)
CO2: 20 MEQ/L (ref 23–33)
CREAT SERPL-MCNC: 2.8 MG/DL (ref 0.4–1.2)
ERYTHROCYTE [DISTWIDTH] IN BLOOD BY AUTOMATED COUNT: 14.5 % (ref 11.5–14.5)
ERYTHROCYTE [DISTWIDTH] IN BLOOD BY AUTOMATED COUNT: 53.1 FL (ref 35–45)
GFR SERPL CREATININE-BSD FRML MDRD: 23 ML/MIN/1.73M2
GLUCOSE BLD-MCNC: 119 MG/DL (ref 70–108)
GLUCOSE BLD-MCNC: 172 MG/DL (ref 70–108)
GLUCOSE BLD-MCNC: 177 MG/DL (ref 70–108)
GLUCOSE BLD-MCNC: 228 MG/DL (ref 70–108)
GLUCOSE BLD-MCNC: 229 MG/DL (ref 70–108)
HCT VFR BLD CALC: 31.8 % (ref 42–52)
HEMOGLOBIN: 10.1 GM/DL (ref 14–18)
INR BLD: 3.12 (ref 0.85–1.13)
MCH RBC QN AUTO: 32.3 PG (ref 26–33)
MCHC RBC AUTO-ENTMCNC: 31.8 GM/DL (ref 32.2–35.5)
MCV RBC AUTO: 101.6 FL (ref 80–94)
PLATELET # BLD: 138 THOU/MM3 (ref 130–400)
PMV BLD AUTO: 10.4 FL (ref 9.4–12.4)
POTASSIUM SERPL-SCNC: 3.2 MEQ/L (ref 3.5–5.2)
RBC # BLD: 3.13 MILL/MM3 (ref 4.7–6.1)
SODIUM BLD-SCNC: 141 MEQ/L (ref 135–145)
WBC # BLD: 5.2 THOU/MM3 (ref 4.8–10.8)

## 2022-02-15 PROCEDURE — 6370000000 HC RX 637 (ALT 250 FOR IP): Performed by: STUDENT IN AN ORGANIZED HEALTH CARE EDUCATION/TRAINING PROGRAM

## 2022-02-15 PROCEDURE — 92523 SPEECH SOUND LANG COMPREHEN: CPT

## 2022-02-15 PROCEDURE — 6360000002 HC RX W HCPCS: Performed by: NURSE PRACTITIONER

## 2022-02-15 PROCEDURE — APPSS180 APP SPLIT SHARED TIME > 60 MINUTES: Performed by: NURSE PRACTITIONER

## 2022-02-15 PROCEDURE — 36415 COLL VENOUS BLD VENIPUNCTURE: CPT

## 2022-02-15 PROCEDURE — 85610 PROTHROMBIN TIME: CPT

## 2022-02-15 PROCEDURE — 97129 THER IVNTJ 1ST 15 MIN: CPT

## 2022-02-15 PROCEDURE — 85027 COMPLETE CBC AUTOMATED: CPT

## 2022-02-15 PROCEDURE — 6370000000 HC RX 637 (ALT 250 FOR IP): Performed by: NURSE PRACTITIONER

## 2022-02-15 PROCEDURE — 2580000003 HC RX 258: Performed by: PHYSICIAN ASSISTANT

## 2022-02-15 PROCEDURE — 6370000000 HC RX 637 (ALT 250 FOR IP): Performed by: PHYSICIAN ASSISTANT

## 2022-02-15 PROCEDURE — 94761 N-INVAS EAR/PLS OXIMETRY MLT: CPT

## 2022-02-15 PROCEDURE — 92526 ORAL FUNCTION THERAPY: CPT

## 2022-02-15 PROCEDURE — 2500000003 HC RX 250 WO HCPCS: Performed by: STUDENT IN AN ORGANIZED HEALTH CARE EDUCATION/TRAINING PROGRAM

## 2022-02-15 PROCEDURE — 82948 REAGENT STRIP/BLOOD GLUCOSE: CPT

## 2022-02-15 PROCEDURE — 99232 SBSQ HOSP IP/OBS MODERATE 35: CPT | Performed by: STUDENT IN AN ORGANIZED HEALTH CARE EDUCATION/TRAINING PROGRAM

## 2022-02-15 PROCEDURE — 1200000003 HC TELEMETRY R&B

## 2022-02-15 PROCEDURE — 99233 SBSQ HOSP IP/OBS HIGH 50: CPT | Performed by: INTERNAL MEDICINE

## 2022-02-15 PROCEDURE — 6370000000 HC RX 637 (ALT 250 FOR IP): Performed by: HOSPITALIST

## 2022-02-15 PROCEDURE — 80048 BASIC METABOLIC PNL TOTAL CA: CPT

## 2022-02-15 RX ORDER — POTASSIUM CHLORIDE 7.45 MG/ML
10 INJECTION INTRAVENOUS PRN
Status: DISCONTINUED | OUTPATIENT
Start: 2022-02-15 | End: 2022-02-17 | Stop reason: HOSPADM

## 2022-02-15 RX ORDER — POTASSIUM CHLORIDE 20 MEQ/1
40 TABLET, EXTENDED RELEASE ORAL PRN
Status: DISCONTINUED | OUTPATIENT
Start: 2022-02-15 | End: 2022-02-17 | Stop reason: HOSPADM

## 2022-02-15 RX ORDER — WARFARIN SODIUM 1 MG/1
1 TABLET ORAL
Status: COMPLETED | OUTPATIENT
Start: 2022-02-15 | End: 2022-02-15

## 2022-02-15 RX ORDER — DIGOXIN 125 MCG
125 TABLET ORAL DAILY
Status: DISCONTINUED | OUTPATIENT
Start: 2022-02-15 | End: 2022-02-17 | Stop reason: HOSPADM

## 2022-02-15 RX ADMIN — ISOSORBIDE MONONITRATE 120 MG: 60 TABLET, EXTENDED RELEASE ORAL at 09:01

## 2022-02-15 RX ADMIN — SODIUM BICARBONATE 650 MG: 650 TABLET ORAL at 12:27

## 2022-02-15 RX ADMIN — ALPRAZOLAM 0.5 MG: 0.5 TABLET ORAL at 02:22

## 2022-02-15 RX ADMIN — METOPROLOL 100 MG: 100 TABLET ORAL at 20:05

## 2022-02-15 RX ADMIN — TRAMADOL HYDROCHLORIDE 50 MG: 50 TABLET, FILM COATED ORAL at 12:24

## 2022-02-15 RX ADMIN — POTASSIUM BICARBONATE 20 MEQ: 782 TABLET, EFFERVESCENT ORAL at 09:02

## 2022-02-15 RX ADMIN — HYDRALAZINE HYDROCHLORIDE 100 MG: 50 TABLET, FILM COATED ORAL at 21:34

## 2022-02-15 RX ADMIN — AMIODARONE HYDROCHLORIDE 200 MG: 200 TABLET ORAL at 09:02

## 2022-02-15 RX ADMIN — PHENOBARBITAL SODIUM 32.4 MG: 65 INJECTION INTRAMUSCULAR; INTRAVENOUS at 09:01

## 2022-02-15 RX ADMIN — SODIUM BICARBONATE 650 MG: 650 TABLET ORAL at 09:01

## 2022-02-15 RX ADMIN — METOPROLOL 100 MG: 100 TABLET ORAL at 09:02

## 2022-02-15 RX ADMIN — Medication 100 MG: at 09:02

## 2022-02-15 RX ADMIN — INSULIN GLARGINE 20 UNITS: 100 INJECTION, SOLUTION SUBCUTANEOUS at 20:09

## 2022-02-15 RX ADMIN — TRAMADOL HYDROCHLORIDE 100 MG: 50 TABLET, COATED ORAL at 20:05

## 2022-02-15 RX ADMIN — Medication 1 TABLET: at 09:02

## 2022-02-15 RX ADMIN — ATORVASTATIN CALCIUM 80 MG: 80 TABLET, FILM COATED ORAL at 20:06

## 2022-02-15 RX ADMIN — FOLIC ACID 1 MG: 5 INJECTION, SOLUTION INTRAMUSCULAR; INTRAVENOUS; SUBCUTANEOUS at 09:01

## 2022-02-15 RX ADMIN — SODIUM BICARBONATE 650 MG: 650 TABLET ORAL at 20:06

## 2022-02-15 RX ADMIN — INSULIN LISPRO 2 UNITS: 100 INJECTION, SOLUTION INTRAVENOUS; SUBCUTANEOUS at 12:22

## 2022-02-15 RX ADMIN — TICAGRELOR 90 MG: 90 TABLET ORAL at 09:02

## 2022-02-15 RX ADMIN — WARFARIN SODIUM 1 MG: 1 TABLET ORAL at 18:13

## 2022-02-15 RX ADMIN — LOSARTAN POTASSIUM 50 MG: 50 TABLET, FILM COATED ORAL at 09:02

## 2022-02-15 RX ADMIN — ALPRAZOLAM 0.5 MG: 0.5 TABLET ORAL at 09:01

## 2022-02-15 RX ADMIN — POTASSIUM CHLORIDE 40 MEQ: 1500 TABLET, EXTENDED RELEASE ORAL at 05:52

## 2022-02-15 RX ADMIN — SODIUM CHLORIDE, PRESERVATIVE FREE 10 ML: 5 INJECTION INTRAVENOUS at 09:01

## 2022-02-15 RX ADMIN — SODIUM BICARBONATE 650 MG: 650 TABLET ORAL at 18:13

## 2022-02-15 RX ADMIN — ALPRAZOLAM 0.5 MG: 0.5 TABLET ORAL at 22:49

## 2022-02-15 RX ADMIN — TICAGRELOR 90 MG: 90 TABLET ORAL at 20:05

## 2022-02-15 RX ADMIN — DIGOXIN 125 MCG: 125 TABLET ORAL at 12:24

## 2022-02-15 RX ADMIN — HYDRALAZINE HYDROCHLORIDE 100 MG: 50 TABLET, FILM COATED ORAL at 05:54

## 2022-02-15 RX ADMIN — INSULIN LISPRO 4 UNITS: 100 INJECTION, SOLUTION INTRAVENOUS; SUBCUTANEOUS at 18:13

## 2022-02-15 RX ADMIN — SODIUM CHLORIDE, PRESERVATIVE FREE 10 ML: 5 INJECTION INTRAVENOUS at 20:11

## 2022-02-15 RX ADMIN — INSULIN LISPRO 2 UNITS: 100 INJECTION, SOLUTION INTRAVENOUS; SUBCUTANEOUS at 20:06

## 2022-02-15 RX ADMIN — TRAMADOL HYDROCHLORIDE 100 MG: 50 TABLET, COATED ORAL at 04:32

## 2022-02-15 ASSESSMENT — ENCOUNTER SYMPTOMS
COUGH: 0
TROUBLE SWALLOWING: 0
BACK PAIN: 0
PHOTOPHOBIA: 0
VOMITING: 0
COLOR CHANGE: 0
WHEEZING: 0
SORE THROAT: 0
NAUSEA: 0
ABDOMINAL PAIN: 0

## 2022-02-15 ASSESSMENT — PAIN DESCRIPTION - ONSET
ONSET: ON-GOING
ONSET: ON-GOING

## 2022-02-15 ASSESSMENT — PAIN SCALES - GENERAL
PAINLEVEL_OUTOF10: 8
PAINLEVEL_OUTOF10: 8
PAINLEVEL_OUTOF10: 9
PAINLEVEL_OUTOF10: 0
PAINLEVEL_OUTOF10: 9
PAINLEVEL_OUTOF10: 0
PAINLEVEL_OUTOF10: 0

## 2022-02-15 ASSESSMENT — PAIN DESCRIPTION - ORIENTATION
ORIENTATION: RIGHT
ORIENTATION: RIGHT

## 2022-02-15 ASSESSMENT — PAIN DESCRIPTION - LOCATION
LOCATION: ANKLE
LOCATION: ANKLE

## 2022-02-15 ASSESSMENT — PAIN DESCRIPTION - FREQUENCY
FREQUENCY: CONTINUOUS
FREQUENCY: CONTINUOUS

## 2022-02-15 ASSESSMENT — PAIN DESCRIPTION - DESCRIPTORS
DESCRIPTORS: ACHING
DESCRIPTORS: ACHING

## 2022-02-15 ASSESSMENT — PAIN DESCRIPTION - PROGRESSION
CLINICAL_PROGRESSION: NOT CHANGED
CLINICAL_PROGRESSION: NOT CHANGED

## 2022-02-15 ASSESSMENT — PAIN SCALES - WONG BAKER: WONGBAKER_NUMERICALRESPONSE: 0

## 2022-02-15 ASSESSMENT — PAIN DESCRIPTION - PAIN TYPE
TYPE: ACUTE PAIN
TYPE: ACUTE PAIN

## 2022-02-15 NOTE — PROGRESS NOTES
Cardiology Progress Note      Patient:  Philipp Hayes  YOB: 1965  MRN: [id number]   Acct: [de-identified]  Admit Date:  2/7/2022  Primary Cardiologist: Farhad Ervin MD  Note per dr Vaishnavi Fuller for Consultation:  ICD shock        History Of Present Illness:    64 y. o. male CAD status post PCI, severe LV dysfunction status post ICD, atrial fibrillation, alcohol abuse, diabetes, hypertension, hyperlipidemia who presented to the hospital after ICD shock.  The ICD shocked him 6 times on 2/7/2022. Val Robin is not oriented currently in ICU most of the history was obtained from the chart.  Per history patient has been drinking excessively at home.  In the hospital CIWA scale was initiated in phenobarbital protocol was started.  Cardiology was consulted for ICD shocks.  Interrogation of the device showed atrial fibrillation with RVR with heart rates in excess of 200 bpm.  No ventricular tachycardia or fibrillation.  Echocardiogram on 2/8/2022 showed ejection fraction of 25 to 30% with moderately dilated LA.  Creatinine is 2.0, potassium is 3.4, H&H is 12/36\"    02/14/2022   Pt awake, alert and oriented to person, place. NAD. Feeling okay. Denies CP, SOB. D/w patient about HR and medication adjustments. Importance of taking meds when he leaves the hospital to help with HR and other health concerns. Patient expressed understanding and concern for AICD shocks again. Subjective (Events in last 24 hours):  Pt awake, alert. No acute changes overnight. HR still running 90s-110/115 on lopressor 100 mg BID. D/w patient about adjusting and adding meds to help control HR better.    Objective:   BP (!) 116/103   Pulse 112   Temp 98.4 °F (36.9 °C) (Oral)   Resp 20   Ht 6' 2\" (1.88 m)   Wt 263 lb 0.1 oz (119.3 kg)   SpO2 96%   BMI 33.77 kg/m²      Vss, BP stable  TELEMETRY: afib cvr/rvr, 90s-110  Physical Exam:  General Appearance: ill appearing, alert and oriented to person, place and time, in no acute distress  Cardiovascular: normal/fast rate, irregular rhythm, normal S1 and S2, no murmurs, rubs, clicks, or gallops, distal pulses intact, no carotid bruits, no JVD  Pulmonary/Chest: clear to auscultation bilaterally- no wheezes, rales or rhonchi, normal air movement, no respiratory distress  Abdomen: soft, non-tender, non-distended, normal bowel sounds, no masses   Extremities: no cyanosis, clubbing or edema, ecchymosis of left medial arm/forearm, + peripheral pulses   Skin: warm and dry, no rash or erythema  Neurological: alert, oriented, normal speech, no focal findings or movement disorder noted    Medications:    warfarin  1 mg Oral Once    metoprolol tartrate  100 mg Oral BID    dilTIAZem  20 mg IntraVENous Once    sodium bicarbonate  650 mg Oral 4x Daily    thiamine  100 mg Oral Daily    potassium bicarb-citric acid  20 mEq Oral Daily    amiodarone  200 mg Oral Daily    [Held by provider] bumetanide  2 mg IntraVENous Daily    folic acid  1 mg IntraMUSCular Daily    sodium chloride flush  5-40 mL IntraVENous 2 times per day    multivitamin  1 tablet Oral Daily    atorvastatin  80 mg Oral Nightly    isosorbide mononitrate  120 mg Oral Daily    ticagrelor  90 mg Oral BID    insulin lispro  0-12 Units SubCUTAneous TID WC    insulin lispro  0-6 Units SubCUTAneous Nightly    hydrALAZINE  100 mg Oral 3 times per day    losartan  50 mg Oral Daily    nicotine  1 patch TransDERmal Q24H    insulin glargine  20 Units SubCUTAneous Nightly    warfarin placeholder: dosing by pharmacy   Other RX Placeholder      dilTIAZem      sodium chloride      dextrose       potassium chloride, 40 mEq, PRN   Or  potassium alternative oral replacement, 40 mEq, PRN   Or  potassium chloride, 10 mEq, PRN  benzocaine-menthol, 1 lozenge, Q2H PRN  ALPRAZolam, 0.5 mg, TID PRN  traMADol, 50 mg, Q6H PRN   Or  traMADol, 100 mg, Q6H PRN  acetaminophen, 650 mg, Q4H PRN   Or  acetaminophen, 650 mg, Q4H PRN  potassium chloride, 10 mEq, PRN  hydrALAZINE, 10 mg, Q6H PRN  sodium chloride flush, 5-40 mL, PRN  sodium chloride, 25 mL, PRN  ondansetron, 4 mg, Q8H PRN   Or  ondansetron, 4 mg, Q6H PRN  polyethylene glycol, 17 g, Daily PRN  nitroGLYCERIN, 0.4 mg, Q5 Min PRN  glucose, 15 g, PRN  glucagon (rDNA), 1 mg, PRN  dextrose, 100 mL/hr, PRN  dextrose bolus (hypoglycemia), 125 mL, PRN   Or  dextrose bolus (hypoglycemia), 250 mL, PRN        Diagnostics:  EKG:   TTE 2/8/22  Summary   Technically difficult examination.   Left ventricular size is mildly dilated and systolic function is severely   reduced. Ejection fraction was estimated at 25-30%.  LV wall thickness is  within normal limits.   The left atrium is moderately dilated.   Device lead seen in the right heart.    Signature    ----------------------------------------------------------------   Electronically signed by Odilia Bishop MD (Interpreting   physician) on 02/08/2022 at 12:42      TTE 10/2020      Summary   Technically difficult examination.   Left ventricle size is normal.   Normal left ventricular wall thickness.   There was severe global hypokinesis of the left ventricle.   Systolic function was severely reduced.   Ejection fraction is visually estimated in the range of 30 % to 35%.   The left atrium is Moderately dilated.    Signature    ----------------------------------------------------------------   Electronically signed by Mary Bennett MD (Interpreting   physician) on 10/19/2020 at 05:13 PM     Cath: 10/2020  CARDIAC CATHETERIZATION     PATIENT NAME: Keli GUZMAN                      DENZELZI:        1965  MED REC NO:   [id number]                           ROOM:       [facility]  ACCOUNT NO:   [id number]                           ADMIT DATE: 10/19/2020  PROVIDER: Janell Cooper MD     DATE OF PROCEDURE:  10/19/2020     INDICATION:  Ventricular tachycardia, ST-elevation myocardial  infarction.     DESCRIPTION OF PROCEDURE:  After a verbal informed consent was obtained  from the patient, he was brought to the cardiac catheterization  laboratory and prepped in sterile fashion.  Right femoral artery was  chosen as the primary point of access.  Preprocedure timeout was  completed.  After infiltration of the right inguinal region with 2%  lidocaine using micropuncture and modified Seldinger technique under  fluoroscopic guidance and ultrasound guidance, I was able to insert a  6-Moroccan sheath into the right femoral artery.  Thereafter, diagnostic  coronary angiography was performed using a JL-4.     CORONARY ANGIOGRAM:  LEFT MAIN:  Patent without any significant obstruction.     LAD:  About 50% stenosis in the mid LAD.  The distal LAD has mild  luminal irregularities, but no significant obstruction.  There is about  90% diagonal stenosis seen in the proximal aspect of the diagonal.     LCX:  There is a previously placed stent in the proximal LCX that is  patent.     RCA:  .  Diffusely diseased and there is about 70% to 80% stenosis  proximally and about 90% stenosis in the mid segment.     BYPASS ANGIOGRAPHY:  SVG TO RCA:  Ostium, body, anastomosis of the SVG to RCA are patent. RCA beyond the anastomosis has mild luminal irregularities, but no  significant obstruction. Debra Bucy is dominant.     LIMA TO OM:  Ostium, body, and anastomosis of the LIMA to OM are patent.   There is no significant disease beyond the OM.  The OM again is  perfusing in the antegrade direction.     INTERVENTION:  Given the findings and presentation, I elected to proceed  with intervention.  It was clear that the patient is having VT, but it  is unclear which vessels were.  The LAD appeared to have 50% stenosis  and the diagonal branch also had 90% stenosis.  At this point, likely  the diagonal branch is causing some degree of VT, but I want to make  sure that LAD was not ischemic.  Therefore, I chose to proceed with FFR  of the LAD prior to proceeding with intervention given that I will need  to potentially bring the stent back into the ostium of the diagonal  branch which would make stenting of the LAD difficult.  I exchanged out  for a EBU 3.75 guiding catheter, 6-Lithuanian.  I cannulated the left main  without any complication.  Heparin IV was given.  ACT was confirmed to  be above 250 seconds.  I wired the LAD using a FFR wire.  IC  nitroglycerin was given.  IV adenosine was infused.  Three minutes after  FFR adenosine infusion the FFR was 0.76.  Given these findings, I  elected to proceed with intervention.  I stented the LAD using a 25 cm  Xience Natali GENNARO.  Postdilated the stent with a 4.0 x 15 NC balloon up  to 12 to 24 atmospheres.  Post-PCI angiography demonstrated JESÚS-3 flow  without any perforation, dissection, distal embolization, side branch  loss, or guide or guidewire-induced trauma.  I re-wired and took the FFR  wire out, re-wired the diagonal branch and then stented the diagonal  branch with a 2.5 x 23 Xience Natali GENNARO at 12 atmospheres.  I  postdilated the stent with 3.0 x 15 NC balloon up to 20 atmospheres.  I  then passed a 2.5 x [address] in an overlapping fashion  distal to the first stent deployed at 9 atmospheres.  I postdilated the  stent with a 3.0 NC balloon up to 24 atmospheres.  Post-PCI angiography  demonstrated JESÚS-3 flow without any perforation, dissection, distal  embolization, side branch loss, or guide or guidewire-induced trauma. All equipments were removed from the patient.  The patient tolerated the  procedure well.     IMMEDIATE COMPLICATIONS:  None.     MEDICATIONS:  See EMR.     ESTIMATED BLOOD LOSS:  Less than 50 mL.     SUMMARY:  Successful FFR-guided PCI of the LAD with one drug-eluting stent; successful PCI of the diagonal branch.     PLAN:  1.  Bedrest.  2.  IV fluids. 3.  Overnight observation. 4.  Guideline-directed therapy for CAD. 5.  TTE.   6.  Followup with myself in one to two weeks postprocedure.     All the above were explained to the patient and the patient's family. They are agreeable and amenable to the plan.     Barney Amaro MD   D: 10/22/2020 15:44:02    Lab Data:    Cardiac Enzymes:  No results for input(s): CKTOTAL, CKMB, CKMBINDEX, TROPONINI in the last 72 hours. CBC:   Lab Results   Component Value Date    WBC 5.2 02/15/2022    RBC 3.13 02/15/2022    RBC 4.75 11/03/2011    HGB 10.1 02/15/2022    HCT 31.8 02/15/2022     02/15/2022       CMP:    Lab Results   Component Value Date     02/15/2022    K 3.2 02/15/2022    K 3.3 02/10/2022     02/15/2022    CO2 20 02/15/2022    BUN 30 02/15/2022    CREATININE 2.8 02/15/2022    LABGLOM 23 02/15/2022    GLUCOSE 172 02/15/2022    CALCIUM 7.4 02/15/2022       Hepatic Function Panel:    Lab Results   Component Value Date    ALKPHOS 99 02/08/2022    ALT 20 02/08/2022    AST 30 02/08/2022    PROT 5.8 02/08/2022    BILITOT 0.9 02/08/2022    BILIDIR <0.2 02/07/2022    LABALBU 3.2 02/08/2022       Magnesium:    Lab Results   Component Value Date    MG 2.2 02/11/2022       PT/INR:    Lab Results   Component Value Date    INR 3.12 02/15/2022       HgBA1c:    Lab Results   Component Value Date    LABA1C 7.0 02/07/2022       FLP:    Lab Results   Component Value Date    TRIG 134 11/12/2021    HDL 48 11/12/2021    LDLCALC 73 11/12/2021       TSH:    Lab Results   Component Value Date    TSH 5.750 02/07/2022         Assessment:  AICD shock--from afib  Persistent longstanding afib cvr--episodes of rvr  CAD s/p prior CABG, PCI  Severe CDMP  EF 25-30% s/p AICD  HTN-well controlled   DM  HLD  ETOH abuse/withdrawal--per ICU  CKD    INR 3.12. Coumadin dosing per pharmacy. Plan:  Blood pressures have been stable, mostly 120s-130s/80s-90s recently. HR has been 80s-110, afib, higher while awake and sitting. Continue amio 200 mg daily. Will increase lopressor to 100 mg daily. Hold for BP <100. Hold for HR <60. Hold bumex today. Likely okay to restart tomorrow on home dose.       Add digoxin 0.125 mcg (lower dose d/t CrCl and concomitant amio). Check Digoxin level in 24-48 hours. Ordered for 1400 tomorrow. Avoid cardizem d/t CHF. Will continue to monitor for adjustment in meds for HR. Continue BP meds as tolerated. Will need coumadin continued per pharmacy. Cardiology will monitor from [facility]. Call for any further questions/concerns. F/u with Dr Lilia Oppenheim 3-4 weeks.  (no need for 02/17 appt)   Electronically signed by Sue Chavez PA-C on 2/15/2022 at 10:12 AM

## 2022-02-15 NOTE — CARE COORDINATION
2/15/22, 12:19 PM EST    DISCHARGE ON GOING EVALUATION    Qasim Knight day: 8  Location: [facility]07/007-A Reason for admit: Alcohol abuse [F10.10]  Atrial fibrillation with rapid ventricular response ([address] [I48.91]  Atrial fibrillation with RVR ([address] [I48.91]  AICD discharge [Z45.02]   Procedure:   2/14 Right ankle xray   Impression:        1. No acute bony abnormality. 2. Mild degenerative changes of the right ankle. 3. Mild soft tissue swelling is seen about the right ankle. Barriers to Discharge: Cardiology following, creatinine 2.8, INR 3.12. PT/OT. Diabetes management, electrolyte replacement protocols. Pharmacy dosing oumadin. PCP: ISABELLA Tejada CNP  Readmission Risk Score: 17.8 ( )%  Patient Goals/Plan/Treatment Preferences: Met with Halima Glez. He currently lives at home alone. States he has support from his daughter and sisters. Plan is to return home. He denies need for DME and declines HH. Will follow.

## 2022-02-15 NOTE — PLAN OF CARE
Problem: Falls - Risk of:  Goal: Will remain free from falls  Description: Will remain free from falls  2/15/2022 1024 by Kaley Khan RN  Outcome: Ongoing  Note: Call light in reach, bed in lowest position, and bed alarm activated. Education given on use of call light before ambulation and when in need of assistance. Patient expressed understanding. Hourly visual checks performed and charted. Toileting offered to patient. No falls this shift, at any time. Arm band and falling star in place. Will continue to monitor. Problem: Skin Integrity:  Goal: Absence of new skin breakdown  Description: Absence of new skin breakdown  2/15/2022 1024 by Kaley Khan RN  Outcome: Ongoing  Note: No signs of new skin breakdown with each assessment. Skin remains warm, dry, intact. Mucous membranes pink & moist. Patient is able to turn self. Problem: Pain:  Goal: Pain level will decrease  Description: Pain level will decrease  2/15/2022 1024 by Kaley Khan RN  Outcome: Ongoing  Note: Patient able to use 0-10 pain scale. Complains of right heel pain. Agreeable to take PRN pain medications. Ice applied for comfort. Problem: Discharge Planning:  Goal: Discharged to appropriate level of care  Description: Discharged to appropriate level of care  Outcome: Ongoing  Note: Discharge planning in process and discussed with patient/family. Social work consulted for any additional needs. Care manager aware of discharge needs. Care plan reviewed with patient. Patient verbalizes understanding of the plan of care and contributed to goal setting.

## 2022-02-15 NOTE — PLAN OF CARE
Problem: Falls - Risk of:  Goal: Will remain free from falls  Description: Will remain free from falls  2/14/2022 2323 by Delroy Matias RN  Outcome: Ongoing  2/14/2022 1023 by Zion Islas RN  Outcome: Ongoing  Note: Up with assist. He has pain to his right foot and is unable to bear weight without support. Goal: Absence of physical injury  Description: Absence of physical injury  Outcome: Ongoing     Problem: Skin Integrity:  Goal: Will show no infection signs and symptoms  Description: Will show no infection signs and symptoms  2/14/2022 2323 by Delroy Matias RN  Outcome: Ongoing  2/14/2022 1023 by Zion Islas RN  Outcome: Ongoing  Note: Frequent position change.   Goal: Absence of new skin breakdown  Description: Absence of new skin breakdown  Outcome: Ongoing     Problem: Pain:  Goal: Pain level will decrease  Description: Pain level will decrease  2/14/2022 2323 by Delroy Matias RN  Outcome: Ongoing  2/14/2022 1023 by Zion Islas RN  Outcome: Ongoing  Note: He feels his pain is not well controlled wit the medical therapy we are currently offering him  Goal: Control of acute pain  Description: Control of acute pain  Outcome: Ongoing  Goal: Control of chronic pain  Description: Control of chronic pain  Outcome: Ongoing

## 2022-02-15 NOTE — PROGRESS NOTES
Clinical Pharmacy Note    Warfarin consult follow-up    Recent Labs     02/15/22  0342   INR 3.12*     Recent Labs     02/13/22  0614 02/14/22  0443 02/15/22  0342   HGB 12.1* 11.0* 10.1*   HCT 37.7* 34.8* 31.8*   * 119* 138     Significant Drug-Drug Interactions:  New warfarin drug-drug interactions: none  Discontinued drug-drug interactions: phenobarb ends today  Current warfarin drug-drug interactions: ticagrelor (HM), amiodarone (HM)     Date INR Warfarin Dose   2/7/2022 1.05 7.5 mg   2/8/2022 1.02  not given by RN   2/9/2022 1.33 5 mg   2/10/2022 1.40 6 mg   2/11/2022 1.86 4 mg   2/12/2022 2.85 No warfarin   2/13/2022 3.87 No warfarin   2/14/2022 3.90 1 mg   2/15/2022 3.12 1 mg     Notes:                   PT/INR or POC-INR will be monitored routinely until therapeutic INR is achieved.     Andre Alas, PharmD, BCPS, BCCCP  2/15/2022 8:05 AM

## 2022-02-15 NOTE — PROGRESS NOTES
[address] [address]  SPEECH THERAPY  [facility] CVICU 4B  Speech - Language - Cognitive Evaluation    SLP Individual Minutes  Time In: 1250  Time Out: 3522  Minutes: 45  Timed Code Treatment Minutes: 10 Minutes      Cog Evaluation: 20 minutes   Cog Treatment: 10 minutes   Dysphagia Treatment: 15 minutes     Date: 2/15/2022  Patient Name: Aashish Fuentes      CSN: [id number]   : 1965  (64 y.o.)  Gender: male   Referring Physician:  ISABELLA Euceda CNP  Diagnosis: Alcohol abuse   Secondary Diagnosis: Dysphagia   Precautions: aspiration precautions, fall risk   History of Present Illness/Injury: Per chart review; Aashish Fuentes is a 64 y/o9 male who was admitted to [facility] with the above medical dx. The pt was referred for a Cognitive evaluation and continued skilled dysphagia treatment d/t increased confusion, lethargy, and oral dysphagia upon completion of CSE. Past Medical History:   Diagnosis Date    Acute systolic CHF (congestive heart failure) ([address] 2015    Alcohol abuse     stopped drinking since     Anomalous origin of right coronary artery 2014    Anxiety disorder     Arthritis     Atrial fibrillation ([address]     CAD (coronary artery disease) 3/25/2014    s/p CABG in may 2014    Cardiomyopathy ([address]     Chronic kidney disease     Depression     Diabetes mellitus ([address]     Drug abuse ([address]     hx of cacaine abuse, stopped abusing drugs in     GERD (gastroesophageal reflux disease)     H/O cardiac catheterization 2014    Hyperlipidemia     Hypertension     Intradural extramedullary spinal tumor     Lumbar spine tumor     Medtronic dual icd      Neuromuscular disorder ([address]     Other disorders of kidney and ureter in diseases classified elsewhere     Paroxysmal atrial fibrillation ([address] 2014    V tach ([address]     V-tach ([address]     s/p ablation in dec 2014       Pain: No pain reported.     Subjective:  Pt seen upright sitting EOB for dysphagia treatment and cognitive evaluation. Pt's daughter arrived at the end of the session and was present for cognitive treatment. SOCIAL HISTORY:   Living Arrangements: lives at home alone   Work History: works for [employer] Level: 12th grade   Driving Status: Active   Finance Management: Independent  Medication Management: Independent  ADL's: Independent. Hobbies: lottery tickets, baseball games, nascar, football   Vision Status: readers recommended, but pt doesn't always wear them   Hearing: [employer] [facility] in quiet setting. ORAL MOTOR:  Facial / Labial WFL    Lingual WFL    Dentition WFL    Velum WFL    Vocal Quality WFL    Sensation WFL    Cough WFL      SPEECH / VOICE:  Speech and Voice appear to be grossly intact for basic and complex daily communication    LANGUAGE:  Receptive:  Receptive language skills appear to be grossly intact for basic and complex daily communication. Expressive:  Expressive language skills appear to be grossly intact for basic and complex daily communication. COGNITION:  Krzysztof Cognitive Assessment [facility], [place] version 7.3 completed. Pt scored 26/30. Normal is greater than or equal to 26/30.   Inclusion of +1 point given highest level of education achieved less than/equal to 12th grade or GED with limited-0 post-secondary schooling   Orientation:   Immediate Recall: Trial 1 & 2: 5/5 indep   Short-Term Recall: 2/5 indep, 0/5 min A via cateogyr cues, 2/5 MAX A via FO2  Divergent Namin words/min   Problem Solving: fair *suspect baseline   Reasoning: verbal reasonin/2 indep   Thought Organization: good for conversation, however, breakdown with structured naming tasks    Insight: fair   Attention: good sustained and alternating attention *functional without concerns for ST evaluation   Math Computation: 3/3 on serial 7's   Executive Functionin/5 on MOCA     SWALLOWING:  Current Diet: CSE completed on  - recommended minced/moist with thin liquids, upon ST arrival this date, the pt was upgraded to regular solids, thin liquids   Impaired - please refer to treatment below    INTERVENTION: Completed skilled dietary analysis to further assess swallow function and determine if pt is okay with recent diet upgrade vs if further ST is warranted. Patient seen for the duration of his lunch, which included; Chicken pot pie, fresh fruit, and thin liquids. The pt complete 80% of the meal with ST present. Oral phase appear unremarkable and grossly intact. Pt was conversing and continuing to eat/drinks, however, x1 did present with immediate coughing prior to swallow onset. Suspected uncontrolled loss of bolus during oral manipulation as the pt was attempting to speak -- no additional overt s/s of aspiration/penetration occurring throughout meal.   *completed review of swallowing strategies -- w/ emphasis on slow PO intake, and limiting conversation and distractions (TV, multiple visitors) during meals. Pt also encouraged to take single bites/drinks and remain upright during meals. *lethargy and confusion no longer a barrier  *pt no longer going through active withdrawals - pt acknowledged that he doesn't remember admission and first several days at [facility]. Recommended to remain on regular solids, thin liquids with 1-2 additional dietary analysis to reinforce safe swallowing strategies and further assess swallow function to determine if a instrumental assessment is warranted. RECOMMENDATIONS/ASSESSMENT:  DIAGNOSTIC IMPRESSIONS:  Pt seen with Rhea RIVER on this date. Following a informal cognitive linguistic evaluation, as well as, completion of MOCA screener the pt presented with [facility] score, BUT ST noted mild cognitive impairments // Evidenced by 26/30 on the [facility] along with informal assessment and aforementioned data.  Pt demonstrated intact attention, orientation, problem solving, and executive functioning, HOWEVER, he did have increased difficulties with complex naming, word retrieval, and delayed recall //ST recommended continued skilled ST for STM and moderately complex and complex naming and thought organization, BUT pt respectfully declined. Expressive & Receptive language skills appear to be grossly intact for basic and complex daily communication. No dysarthria or dysphonia detected. ST to sign-off for skilled ST as of 2/15/22 - (cognition only). Please re-consult if pt's medical status changes and/or is later agreeable. **Education: Completed skilled education for 10 minutes with inclusion of cognitive recommendations for continued skilled ST to address STM and moderately complex and complex naming and thought organization, BUT pt respectfully declined. Completed education re: ST rationale and the benefit of skilled ST -- Recommended continued OP ST services, with pt also declined. *If pt later agreeable, recommended to follow-up with PCP to receive OP ST orders (pt continued to refuse)  Given pt's decline of continued therapy after initial evaluation this date - ST completed introduction and provided the pt with compensatory memory strategies - WRAP: write it down (calender, date, time, lists, etc), Repeat it 3-4x, Make personal Associations, and utilize visual memory to Picture it. *Pt was receptive to education on this date and stated he would try and use the memory strategies  *poor insight   *pt correlated memory difficulties to; \"I just didn't try because I don't care. \" Pt also stated; \"I'm going to get sober again and start going to meeting, so I wont have to worry about this. \"       Rehabilitation Potential: fair    EDUCATION:  Learner: Patient + daughter   Education:  Reviewed results and recommendations of this evaluation, Reviewed ST goals and Plan of Care and Reviewed recommendations for follow-up  Evaluation of Education: Corrie Rodriguez understanding and Needs further instruction    PLAN:  Skilled SLP intervention on acute care 3-5 x per week or until goals met and/or pt plateaus in function. Specific interventions for next session may include: dietary analysis . PATIENT GOAL:    Return to least restrictive diet. SHORT TERM GOALS:  Short-term Goals  Timeframe for Short-term Goals: 2 weeks  Goal 1: Patient will consume regular solids with thin liquids without s/s of aspiration in order to safely maintain adequate hydration and nutrition  Goal 2: GOAL MET - NO NEW GOAL   Goal 3: Pt will complete cognitive evaluation - GOAL MET - NO NEW GOAL     LONG TERM GOALS:  No LTM Goals recommended, due to anticipated short ELOS. Sophie Lott MA., [address] [facility] / #.[phone]

## 2022-02-15 NOTE — CONSULTS
Brief Intervention and Referral to Treatment Summary    Patient was provided PHQ-9, AUDIT and DAST Screening:      PHQ-9 Score: 0  AUDIT Score:  25  DAST Score:  0    Patients substance use is considered     Dependent    Patients depression is considered:     N/a    Brief Education Was Provided    Patient was receptive    Brief Intervention Is Provided (Only for AUDIT or DAST)     Patient reports readiness to decrease and/or stop use and a plan was discussed     Recommendations/Referrals for Brief and/or Specialized Treatment Provided to Patient     Resources provided. Pt plans on following up with AA meetings.

## 2022-02-15 NOTE — PROGRESS NOTES
CRITICAL CARE PROGRESS NOTE      Patient:  Shirley Monteiro    Unit/Bed:4B-07/007-A  YOB: 1965  MRN: [id number]   PCP: ISABELLA Holloway CNP  Date of Admission: 2/7/2022  Chief Complaint:- right ankle pain    Assessment and Plan:         Heart failure with reduced ejection fraction: AICD placement, on chronic diuretic 2 mg Bumex daily. Cardiology consulted due to AICD firing prior to admission. Hypokalemia: Replacement ordered  Right ankle pain:  X-rays are negative for any acute findings, PT/OT to see patient   Atrial fibrillation with RVR: Patient on Cardizem drip normally on Coumadin. Therapeutic INR achieved 2/12. Add lopressor 2/10. Rate controlled at this time  Mechanical fall: X-rays negative for acute injuries. Likely secondary to alcohol intoxication. Macrocytic anemia: Hemoglobin 10.1, hematocrit 31.8, no active signs of bleeding. Monitor. Mild hypernatremia: resolved;  Monitor, started oral diet   Thrombocytopenia: resolved; Platelets 638, monitor closely in the setting of use of Lovenox. No obvious signs of bleeding. Stable  Chronic kidney disease stage III: Creatinine at baseline. Monitor. Anion gap metabolic acidosis: Secondary to renal failure. Add bicarbonate tabs. CAD: LAD stent placed 10/19/2020, on Brilinta  Diabetes mellitus: Sliding scale insulin, Lantus. Hyperlipidemia: Lipitor 80 mg nightly  Hypertension: Resume home antihypertensives. Wean Cardizem as tolerated. Tobacco abuse: Nicotine patch  Acute alcohol withdrawal: resolved; recent alcohol binge noted. Patient positive for withdrawal symptoms. Patient receiving phenobarbital and required Precedex sedation and transferred to the ICU on 2/9. Precedex stopped 2/14 patient alert and oriented. Encephalopathy: Resolved; secondary to alcohol withdrawal. S/p Precedex.  Improving mentation      INITIAL H AND P AND ICU COURSE:  Izabella Benitez is a 80-year-old white male who presented to [facility] on 2/7/2022 with complaints of chest pain after AICD fired. Has a past medical history of reformed smoker, alcohol abuse, heart failure, anxiety, atrial fibrillation, CAD, cardiomyopathy, CKD, diabetes mellitus, GERD, hyperlipidemia, hypertension, AICD placement. Per report patient presented to [facility] after chest pain following his AICD firing. He reported that the device and fired 5 times at home on 2/7/2022. He reported that he had chest pain immediately following these events. He denies chest pain prior. He reports that this is happened to him in the past.  He states he has not been taking his medications for the last 5 to 6 days and had been drinking excessively. Patient states he had drank 1/5 of apple crown whiskey for the past 5 to 6 days. He was agitated upon presentation. He was initially admitted to the hospitalist service. His home medications were resumed. Cardiology was consulted for AICD firing. He developed worsening agitation secondary to withdrawal symptoms on 2/8. CIWA scale was initiated and phenobarbital Dennis protocol the patient continued to have increased symptoms. He was transferred to the ICU for further care. On 2/9 patient continues to require phenobarbital and Precedex. Patient continues to have withdrawal symptoms. Monitor for ability to wean sedatives. Monitor closely for need for intubation. Past Medical History:  Per HPI  Family History: Mother: diabetes, HTN, stroke Father:   Social History: Reformed smoker, occasional alcohol use, denies drug use    Review of Systems   Constitutional: Negative for fatigue and fever. HENT: Negative for sore throat and trouble swallowing. Eyes: Negative for photophobia and visual disturbance. Respiratory: Negative for cough and wheezing. Cardiovascular: Negative for chest pain and leg swelling. Gastrointestinal: Negative for abdominal pain, nausea and vomiting.    Endocrine: Negative for polydipsia and polyphagia. Genitourinary: Negative for decreased urine volume and flank pain. Musculoskeletal: Negative for back pain and neck pain. Skin: Negative for color change, pallor and rash. Allergic/Immunologic: Negative for food allergies. Neurological: Positive for weakness. Negative for dizziness and light-headedness. Hematological: Negative for adenopathy. Psychiatric/Behavioral: Negative for agitation and confusion. The patient is not nervous/anxious. Scheduled Meds:   metoprolol tartrate  100 mg Oral BID    dilTIAZem  20 mg IntraVENous Once    PHENobarbital  32.4 mg IntraVENous BID    sodium bicarbonate  650 mg Oral 4x Daily    thiamine  100 mg Oral Daily    potassium bicarb-citric acid  20 mEq Oral Daily    amiodarone  200 mg Oral Daily    PHENobarbital  260 mg IntraVENous Once    bumetanide  2 mg IntraVENous Daily    folic acid  1 mg IntraMUSCular Daily    sodium chloride flush  5-40 mL IntraVENous 2 times per day    multivitamin  1 tablet Oral Daily    atorvastatin  80 mg Oral Nightly    isosorbide mononitrate  120 mg Oral Daily    ticagrelor  90 mg Oral BID    insulin lispro  0-12 Units SubCUTAneous TID WC    insulin lispro  0-6 Units SubCUTAneous Nightly    hydrALAZINE  100 mg Oral 3 times per day    losartan  50 mg Oral Daily    nicotine  1 patch TransDERmal Q24H    insulin glargine  20 Units SubCUTAneous Nightly    warfarin placeholder: dosing by pharmacy   Other RX Placeholder     Continuous Infusions:   dilTIAZem      sodium chloride      dextrose         PHYSICAL EXAMINATION:  T:  98.6. P:  97. RR:  12. B/P:  138/84. FiO2:  0. O2 Sat:  97.  I/O:  450/0  Body mass index is 33.77 kg/m². GCS: 15  General:   Acute on chronically ill-appearing white male  HEENT:  normocephalic and atraumatic. No scleral icterus. PERR  Neck: supple. No Thyromegaly. Lungs: clear to auscultation. No retractions  Cardiac: Atrial fibrillation. No JVD. Abdomen: soft. Nontender. Extremities:  No clubbing, cyanosis, or edema x 4. Vasculature: capillary refill < 3 seconds. Palpable dorsalis pedis pulses. Skin:  warm and dry. Psych:  Alert and oriented x3. Affect appropriate  Lymph:  No supraclavicular adenopathy. Neurologic:  No focal deficit. No seizures. Data: (All radiographs, tracings, PFTs, and imaging are personally viewed and interpreted unless otherwise noted). Sodium 141, potassium 3.2, chloride 108, CO2 20, BUN 30, creatinine 2.8, anion gap 13.0, glucose 172. WBC 5.2, hemoglobin 10.1, hematocrit 31.8, platelet count 211  Telemetry shows atrial fibrillation  CT head 2/12/2022 reports stable CT  Right ankle x-ray 2/14/2022 reports degenerative changes, no acute process noted  KUB 2/9/2022 reports NG tube in the stomach. Chest x-ray 2/7/2022 reports no acute process  Left knee x-ray 2/7/2022 reports no acute abnormality  Right radial ulnar x-ray 2/7/2022 reports no acute process  Right humerus x-ray 2/7/2022 reports no acute process  Renal ultrasound 1/26/2022 reports probable bilateral renal cyst.  Complex lesion of the left kidney malignancy cannot be excluded. .  EKG 2/7/2022 reports atrial fibrillation with rapid ventricular response  Echocardiogram 2/8/2022 reports ejection fraction 25 to 30% moderate dilation of left atrium          Meets Continued ICU Level Care Criteria:    [] Yes   [x] No - Transfer Planned to listed location: Awaiting medical telemetry bed  [] HOSPITALIST CONTACTED-      Case and plan discussed with Dr. Ravi Green. Electronically signed by ISABELLA Camacho - CNP  CRITICAL CARE SPECIALIST  Patient seen by me including key components of medical care. Case discussed with nurse practitioner. Patient is stable for medical floor. Transition out of the intensive care unit. .  Italicized font, if present,  represents changes to the note made by me. CC time 20 minutes. Time was discontiguous. Time does not include procedure.  Time does include my direct assessment of the patient and coordination of care. Time represents more than 50% of the time involved with patient care by the [address] [place] [address] team.  Electronically signed by Latonya Hernandez.  Shara Agosto MD.

## 2022-02-16 LAB
ANION GAP SERPL CALCULATED.3IONS-SCNC: 8 MEQ/L (ref 8–16)
BUN BLDV-MCNC: 27 MG/DL (ref 7–22)
CALCIUM SERPL-MCNC: 7.5 MG/DL (ref 8.5–10.5)
CHLORIDE BLD-SCNC: 109 MEQ/L (ref 98–111)
CO2: 22 MEQ/L (ref 23–33)
CREAT SERPL-MCNC: 2.4 MG/DL (ref 0.4–1.2)
DIGOXIN LEVEL: 0.4 NG/ML (ref 0.5–2)
ERYTHROCYTE [DISTWIDTH] IN BLOOD BY AUTOMATED COUNT: 14.3 % (ref 11.5–14.5)
ERYTHROCYTE [DISTWIDTH] IN BLOOD BY AUTOMATED COUNT: 53.4 FL (ref 35–45)
GFR SERPL CREATININE-BSD FRML MDRD: 28 ML/MIN/1.73M2
GLUCOSE BLD-MCNC: 105 MG/DL (ref 70–108)
GLUCOSE BLD-MCNC: 131 MG/DL (ref 70–108)
GLUCOSE BLD-MCNC: 153 MG/DL (ref 70–108)
GLUCOSE BLD-MCNC: 162 MG/DL (ref 70–108)
GLUCOSE BLD-MCNC: 271 MG/DL (ref 70–108)
HCT VFR BLD CALC: 32.3 % (ref 42–52)
HEMOGLOBIN: 10 GM/DL (ref 14–18)
INR BLD: 1.52 (ref 0.85–1.13)
MCH RBC QN AUTO: 32.7 PG (ref 26–33)
MCHC RBC AUTO-ENTMCNC: 31 GM/DL (ref 32.2–35.5)
MCV RBC AUTO: 105.6 FL (ref 80–94)
PLATELET # BLD: 123 THOU/MM3 (ref 130–400)
PMV BLD AUTO: 10.5 FL (ref 9.4–12.4)
POTASSIUM SERPL-SCNC: 3.9 MEQ/L (ref 3.5–5.2)
RBC # BLD: 3.06 MILL/MM3 (ref 4.7–6.1)
SODIUM BLD-SCNC: 139 MEQ/L (ref 135–145)
WBC # BLD: 5 THOU/MM3 (ref 4.8–10.8)

## 2022-02-16 PROCEDURE — 1200000003 HC TELEMETRY R&B

## 2022-02-16 PROCEDURE — 85610 PROTHROMBIN TIME: CPT

## 2022-02-16 PROCEDURE — 6370000000 HC RX 637 (ALT 250 FOR IP): Performed by: PHYSICIAN ASSISTANT

## 2022-02-16 PROCEDURE — 82948 REAGENT STRIP/BLOOD GLUCOSE: CPT

## 2022-02-16 PROCEDURE — 6370000000 HC RX 637 (ALT 250 FOR IP): Performed by: NURSE PRACTITIONER

## 2022-02-16 PROCEDURE — 85027 COMPLETE CBC AUTOMATED: CPT

## 2022-02-16 PROCEDURE — 6370000000 HC RX 637 (ALT 250 FOR IP): Performed by: STUDENT IN AN ORGANIZED HEALTH CARE EDUCATION/TRAINING PROGRAM

## 2022-02-16 PROCEDURE — 6370000000 HC RX 637 (ALT 250 FOR IP)

## 2022-02-16 PROCEDURE — 2580000003 HC RX 258: Performed by: PHYSICIAN ASSISTANT

## 2022-02-16 PROCEDURE — 6370000000 HC RX 637 (ALT 250 FOR IP): Performed by: HOSPITALIST

## 2022-02-16 PROCEDURE — 80162 ASSAY OF DIGOXIN TOTAL: CPT

## 2022-02-16 PROCEDURE — 94760 N-INVAS EAR/PLS OXIMETRY 1: CPT

## 2022-02-16 PROCEDURE — 36415 COLL VENOUS BLD VENIPUNCTURE: CPT

## 2022-02-16 PROCEDURE — 2500000003 HC RX 250 WO HCPCS: Performed by: STUDENT IN AN ORGANIZED HEALTH CARE EDUCATION/TRAINING PROGRAM

## 2022-02-16 PROCEDURE — 99232 SBSQ HOSP IP/OBS MODERATE 35: CPT | Performed by: INTERNAL MEDICINE

## 2022-02-16 PROCEDURE — 80048 BASIC METABOLIC PNL TOTAL CA: CPT

## 2022-02-16 RX ORDER — DIGOXIN 125 MCG
125 TABLET ORAL DAILY
Qty: 30 TABLET | Refills: 3 | Status: SHIPPED | OUTPATIENT
Start: 2022-02-17 | End: 2022-02-24 | Stop reason: SDUPTHER

## 2022-02-16 RX ORDER — WARFARIN SODIUM 2.5 MG/1
2.5 TABLET ORAL
Status: COMPLETED | OUTPATIENT
Start: 2022-02-16 | End: 2022-02-16

## 2022-02-16 RX ORDER — BUMETANIDE 1 MG/1
2 TABLET ORAL DAILY
Status: DISCONTINUED | OUTPATIENT
Start: 2022-02-16 | End: 2022-02-17 | Stop reason: HOSPADM

## 2022-02-16 RX ORDER — METOPROLOL TARTRATE 100 MG/1
100 TABLET ORAL DAILY
Qty: 60 TABLET | Refills: 3 | Status: SHIPPED | OUTPATIENT
Start: 2022-02-16 | End: 2022-03-14 | Stop reason: ALTCHOICE

## 2022-02-16 RX ORDER — LANOLIN ALCOHOL/MO/W.PET/CERES
100 CREAM (GRAM) TOPICAL DAILY
Qty: 30 TABLET | Refills: 3 | Status: ON HOLD | OUTPATIENT
Start: 2022-02-17 | End: 2022-04-20

## 2022-02-16 RX ORDER — MULTIVITAMIN WITH IRON
1 TABLET ORAL DAILY
Qty: 30 TABLET | Refills: 0 | Status: SHIPPED | OUTPATIENT
Start: 2022-02-17 | End: 2022-04-13

## 2022-02-16 RX ADMIN — AMIODARONE HYDROCHLORIDE 200 MG: 200 TABLET ORAL at 07:46

## 2022-02-16 RX ADMIN — ISOSORBIDE MONONITRATE 120 MG: 60 TABLET, EXTENDED RELEASE ORAL at 07:46

## 2022-02-16 RX ADMIN — TICAGRELOR 90 MG: 90 TABLET ORAL at 20:13

## 2022-02-16 RX ADMIN — HYDRALAZINE HYDROCHLORIDE 100 MG: 50 TABLET, FILM COATED ORAL at 07:01

## 2022-02-16 RX ADMIN — METOPROLOL 100 MG: 100 TABLET ORAL at 20:12

## 2022-02-16 RX ADMIN — INSULIN LISPRO 6 UNITS: 100 INJECTION, SOLUTION INTRAVENOUS; SUBCUTANEOUS at 18:10

## 2022-02-16 RX ADMIN — SODIUM BICARBONATE 650 MG: 650 TABLET ORAL at 20:13

## 2022-02-16 RX ADMIN — Medication 100 MG: at 07:46

## 2022-02-16 RX ADMIN — INSULIN LISPRO 2 UNITS: 100 INJECTION, SOLUTION INTRAVENOUS; SUBCUTANEOUS at 12:58

## 2022-02-16 RX ADMIN — ALPRAZOLAM 0.5 MG: 0.5 TABLET ORAL at 07:46

## 2022-02-16 RX ADMIN — DIGOXIN 125 MCG: 125 TABLET ORAL at 07:46

## 2022-02-16 RX ADMIN — SODIUM CHLORIDE, PRESERVATIVE FREE 10 ML: 5 INJECTION INTRAVENOUS at 20:16

## 2022-02-16 RX ADMIN — LOSARTAN POTASSIUM 50 MG: 50 TABLET, FILM COATED ORAL at 07:46

## 2022-02-16 RX ADMIN — ATORVASTATIN CALCIUM 80 MG: 80 TABLET, FILM COATED ORAL at 20:12

## 2022-02-16 RX ADMIN — WARFARIN SODIUM 2.5 MG: 2.5 TABLET ORAL at 18:10

## 2022-02-16 RX ADMIN — Medication 1 TABLET: at 07:46

## 2022-02-16 RX ADMIN — HYDRALAZINE HYDROCHLORIDE 100 MG: 50 TABLET, FILM COATED ORAL at 23:03

## 2022-02-16 RX ADMIN — FOLIC ACID 1 MG: 5 INJECTION, SOLUTION INTRAMUSCULAR; INTRAVENOUS; SUBCUTANEOUS at 07:45

## 2022-02-16 RX ADMIN — HYDRALAZINE HYDROCHLORIDE 100 MG: 50 TABLET, FILM COATED ORAL at 15:38

## 2022-02-16 RX ADMIN — SODIUM BICARBONATE 650 MG: 650 TABLET ORAL at 15:36

## 2022-02-16 RX ADMIN — TRAMADOL HYDROCHLORIDE 100 MG: 50 TABLET, COATED ORAL at 03:58

## 2022-02-16 RX ADMIN — POTASSIUM BICARBONATE 20 MEQ: 782 TABLET, EFFERVESCENT ORAL at 07:45

## 2022-02-16 RX ADMIN — TRAMADOL HYDROCHLORIDE 100 MG: 50 TABLET, COATED ORAL at 14:42

## 2022-02-16 RX ADMIN — SODIUM BICARBONATE 650 MG: 650 TABLET ORAL at 07:46

## 2022-02-16 RX ADMIN — TICAGRELOR 90 MG: 90 TABLET ORAL at 07:46

## 2022-02-16 RX ADMIN — BUMETANIDE 2 MG: 1 TABLET ORAL at 12:32

## 2022-02-16 RX ADMIN — TRAMADOL HYDROCHLORIDE 100 MG: 50 TABLET, COATED ORAL at 20:13

## 2022-02-16 RX ADMIN — ALPRAZOLAM 0.5 MG: 0.5 TABLET ORAL at 20:13

## 2022-02-16 RX ADMIN — METOPROLOL 100 MG: 100 TABLET ORAL at 07:46

## 2022-02-16 RX ADMIN — SODIUM CHLORIDE, PRESERVATIVE FREE 10 ML: 5 INJECTION INTRAVENOUS at 07:46

## 2022-02-16 ASSESSMENT — PAIN DESCRIPTION - ORIENTATION
ORIENTATION: RIGHT
ORIENTATION: RIGHT

## 2022-02-16 ASSESSMENT — PAIN DESCRIPTION - FREQUENCY
FREQUENCY: CONTINUOUS
FREQUENCY: INTERMITTENT

## 2022-02-16 ASSESSMENT — PAIN DESCRIPTION - PROGRESSION
CLINICAL_PROGRESSION: GRADUALLY IMPROVING
CLINICAL_PROGRESSION: GRADUALLY IMPROVING

## 2022-02-16 ASSESSMENT — PAIN DESCRIPTION - PAIN TYPE
TYPE: ACUTE PAIN
TYPE: ACUTE PAIN

## 2022-02-16 ASSESSMENT — PAIN SCALES - GENERAL
PAINLEVEL_OUTOF10: 7
PAINLEVEL_OUTOF10: 0
PAINLEVEL_OUTOF10: 0
PAINLEVEL_OUTOF10: 9
PAINLEVEL_OUTOF10: 8
PAINLEVEL_OUTOF10: 0

## 2022-02-16 ASSESSMENT — PAIN DESCRIPTION - DESCRIPTORS
DESCRIPTORS: ACHING
DESCRIPTORS: ACHING

## 2022-02-16 ASSESSMENT — ENCOUNTER SYMPTOMS
TROUBLE SWALLOWING: 0
BACK PAIN: 0
COUGH: 0
COLOR CHANGE: 0
NAUSEA: 0
CHEST TIGHTNESS: 0
SORE THROAT: 0
VOMITING: 0
ABDOMINAL PAIN: 0
WHEEZING: 0

## 2022-02-16 ASSESSMENT — PAIN DESCRIPTION - LOCATION
LOCATION: ANKLE
LOCATION: ANKLE

## 2022-02-16 ASSESSMENT — PAIN DESCRIPTION - ONSET
ONSET: ON-GOING
ONSET: ON-GOING

## 2022-02-16 NOTE — PLAN OF CARE
Problem: Nutrition  Goal: Optimal nutrition therapy  Outcome: Ongoing   Nutrition Problem #1: Inadequate oral intake  Intervention: Food and/or Nutrient Delivery: Continue Current Diet,Start Oral Nutrition Supplement  Nutritional Goals: Patient will safely consume 75% or more of meals during LOS.

## 2022-02-16 NOTE — CARE COORDINATION
Discharge planning update:    PT/OT/ST, INR 1.52, creatinine 2.4. Bumex, diabetes management. Cardiology to follow prn. Brilinta, electrolyte replacement protocols. INR 1.52 with Pharmacy dosing Coumadin. Garrett plans on returning home alone with family support. He denies need for DME and declines HH. Will follow.    Electronically signed by Fernando Morillo RN on 2/16/2022 at 12:00 PM

## 2022-02-16 NOTE — PLAN OF CARE
Problem: Falls - Risk of:  Goal: Will remain free from falls  Description: Will remain free from falls  2/16/2022 0814 by Stephan Jack LPN  Outcome: Ongoing  Patient remains free of falls at this time. Call light placed in reach of patient. Uses call light effectively. Problem: Falls - Risk of:  Goal: Absence of physical injury  Description: Absence of physical injury  2/16/2022 0814 by Stephan Jack LPN  Outcome: Ongoing  Patient remains free of falls at this time. Call light placed in reach of patient. Uses call light effectively. No injuries noted at this time. Problem: Skin Integrity:  Goal: Will show no infection signs and symptoms  Description: Will show no infection signs and symptoms  2/16/2022 0814 by Stephan Jack LPN  Outcome: Ongoing   No  skin impairments noted. Pt understands the importance of frequent repositioning in order to prevent any skin breakdown. Problem: Skin Integrity:  Goal: Absence of new skin breakdown  Description: Absence of new skin breakdown  2/16/2022 0814 by Stephan Jack LPN  Outcome: Ongoing   No  skin impairments noted. Pt understands the importance of frequent repositioning in order to prevent any skin breakdown. Problem: Pain:  Goal: Pain level will decrease  Description: Pain level will decrease  2/16/2022 0814 by Stephan Jack LPN  Outcome: Ongoing  Pt report pain at 7/10 on scale. Pt states oral/iv/im medication helping to achieve pain goal of a 2/10 on scale. Problem: Pain:  Goal: Control of acute pain  Description: Control of acute pain  2/16/2022 0814 by Stephan Jack LPN  Outcome: Ongoing  Pt report pain at 7/10 on scale. Pt states oral/iv/im medication helping to achieve pain goal of a 2/10 on scale. Problem: Pain:  Goal: Control of chronic pain  Description: Control of chronic pain  2/16/2022 0814 by Stephan Jack LPN  Outcome: Ongoing  Pt report pain at 7/10 on scale.  Pt states oral/iv/im medication helping to achieve pain goal of a 2/10 on scale. Problem: Discharge Planning:  Goal: Discharged to appropriate level of care  Description: Discharged to appropriate level of care  2/16/2022 0814 by Eladio Lopez LPN  Outcome: Ongoing  Pt transferring to non-ICU floor this shift. Care manager and social working helping with discharge needs.

## 2022-02-16 NOTE — PROGRESS NOTES
Pt transferred to [address] [place] [address] with all belongings. Report called to [facility] [[email or web address]

## 2022-02-16 NOTE — PLAN OF CARE
Problem: Falls - Risk of:  Goal: Will remain free from falls  Description: Will remain free from falls  2/15/2022 2023 by Sincere Baird RN  Outcome: Ongoing  2/15/2022 1024 by Estelle Quach RN  Outcome: Ongoing  Note: Call light in reach, bed in lowest position, and bed alarm activated. Education given on use of call light before ambulation and when in need of assistance. Patient expressed understanding. Hourly visual checks performed and charted. Toileting offered to patient. No falls this shift, at any time. Arm band and falling star in place. Will continue to monitor. Goal: Absence of physical injury  Description: Absence of physical injury  2/15/2022 2023 by Sincere Baird RN  Outcome: Ongoing  2/15/2022 1024 by Estelle Quach RN  Outcome: Ongoing     Problem: Skin Integrity:  Goal: Will show no infection signs and symptoms  Description: Will show no infection signs and symptoms  2/15/2022 2023 by Sincere Baird RN  Outcome: Ongoing  2/15/2022 1024 by Estelle Quach RN  Outcome: Ongoing  Goal: Absence of new skin breakdown  Description: Absence of new skin breakdown  2/15/2022 2023 by Sincere Baird RN  Outcome: Ongoing  2/15/2022 1024 by Estelle Quach RN  Outcome: Ongoing  Note: No signs of new skin breakdown with each assessment. Skin remains warm, dry, intact. Mucous membranes pink & moist. Patient is able to turn self. Problem: Pain:  Goal: Pain level will decrease  Description: Pain level will decrease  2/15/2022 2023 by Sincere Baird RN  Outcome: Ongoing  2/15/2022 1024 by Estelle Quach RN  Outcome: Ongoing  Note: Patient able to use 0-10 pain scale. Complains of right heel pain. Agreeable to take PRN pain medications. Ice applied for comfort.      Goal: Control of acute pain  Description: Control of acute pain  2/15/2022 2023 by Sincere Baird RN  Outcome: Ongoing  2/15/2022 1024 by Estelle Quach RN  Outcome: Ongoing  Goal: Control of chronic pain  Description: Control of chronic pain  2/15/2022 2023 by Leyda Farfan RN  Outcome: Ongoing  2/15/2022 1024 by Tomas Vazquez RN  Outcome: Ongoing     Problem: Discharge Planning:  Goal: Discharged to appropriate level of care  Description: Discharged to appropriate level of care  2/15/2022 2023 by Leyda Farfan RN  Outcome: Ongoing  2/15/2022 1025 by Tomas Vazquez RN  Outcome: Ongoing  Note: Discharge planning in process and discussed with patient/family. Social work consulted for any additional needs. Care manager aware of discharge needs.

## 2022-02-16 NOTE — PROGRESS NOTES
Comprehensive Nutrition Assessment    Type and Reason for Visit:  Reassess (po/supplement)    Nutrition Recommendations/Plan:   -Continue MVI, folic acid, and thiamin supplementation. -ONS initiated: Glucerna TID. -Continue current diet per SLP recommendations. Nutrition Assessment:    Pt improving from a nutritional standpoint AEB diet initiation per SLP 2/12/22 with diet upgrade and reported tolerance per pt. Remains at risk for further nutritional compromise r/t dysphagia, Acute Alcohol Withdrawal, Encephalopathy, HF, Atrial Fib with RVR, Mechanical Fall  and underlying medical condition (hx: Macrocytic Anemia, CKD III, CAD, DM, HTN, Drug Abuse). Malnutrition Assessment:  Malnutrition Status: At risk for malnutrition (Comment)    Context:  Acute Illness     Findings of the 6 clinical characteristics of malnutrition:  Energy Intake:  Mild decrease in energy intake (Comment) (< 50%)  Weight Loss:  No significant weight loss     Body Fat Loss:  No significant body fat loss     Muscle Mass Loss:  No significant muscle mass loss    Fluid Accumulation:  1 - Mild Generalized   Strength:  Not Performed    Estimated Daily Nutrient Needs:  Energy (kcal):  1780-2147kcals (15-18kcals/kgm); Weight Used for Energy Requirements:   (119.3kgm (2/11))     Protein (g):  ~86 grams as renal function tolerates (1 gram protein/kgm IBW); Weight Used for Protein Requirements:  Ideal (86 kgm IBW)          Nutrition Related Findings:  - Patient seen, reports tolerating current diet, happy about the upgrade, states still a little dysphagia with solids and liquids. Appetite improving. Typically eats 3 meals a day and blood sugars typically controlled. Meal intake 51-75%. Hasn't tried glucerna shake yet. BM 2/15/22.  2/16/22: Sodium 139, Potassium 3.9, BUN 27, Creatinine 2.4, Glucose 131. Rx: lipitor, bumex, folic acid, lantus, humalog, MVI, sodium bicarbonate, thiamin, coumadin.        Wounds:  None       Current Nutrition Therapies:    ADULT DIET; Regular; 4 carb choices (60 gm/meal)  ADULT ORAL NUTRITION SUPPLEMENT; Breakfast, Lunch, Dinner; Diabetic Oral Supplement    Anthropometric Measures:  · Height: 6' 2\" (188 cm)  · Current Body Weight: 263 lb 0.1 oz (119.3 kg) (2/11/22 +1 generalized edema)   · Admission Body Weight: 262 lb 5.6 oz (119 kg) ((2/8) bed scale no edema)    · Usual Body Weight:  (Per pt used to be~ 280#. Per EMR: 6/3/21: 262#, 11/22/21: 260#14.4oz)     · Ideal Body Weight: 190 lbs;   · BMI: 33.8  · Adjusted Body Weight:  ; No Adjustment   · BMI Categories: Obese Class 1 (BMI 30.0-34. 9)       Nutrition Diagnosis:   · Inadequate oral intake related to cognitive or neurological impairment (alcohol withdrawal) as evidenced by intake 51-75%    Nutrition Interventions:   Food and/or Nutrient Delivery:  Continue Current Diet,Start Oral Nutrition Supplement  Nutrition Education/Counseling:  Education initiated (Encouraged oral intake, diet compliance, and ONS trial)   Coordination of Nutrition Care:  Continue to monitor while inpatient    Goals:  Patient will safely consume 75% or more of meals during LOS. Nutrition Monitoring and Evaluation:   Behavioral-Environmental Outcomes:  None Identified   Food/Nutrient Intake Outcomes:  Food and Nutrient Intake,Supplement Intake,Vitamin/Mineral Intake  Physical Signs/Symptoms Outcomes:  Biochemical Data,Chewing or Swallowing,GI Status,Meal Time Behavior,Nutrition Focused Physical Findings,Skin,Weight     Discharge Planning:     Too soon to determine     Electronically signed by Titi Rosario RD, LD on 2/16/22 at 12:39 PM EST    Contact: [phone]

## 2022-02-16 NOTE — PROGRESS NOTES
CRITICAL CARE PROGRESS NOTE      Patient:  Queta Alcala    Unit/Bed:4B-07/007-A  YOB: 1965  MRN: [id number]   PCP: ISABELLA Belcher CNP  Date of Admission: 2/7/2022  Chief Complaint:- ankle pain    Assessment and Plan:      Heart failure with reduced ejection fraction: AICD placement, on chronic diuretic 2 mg Bumex daily. Cardiology consulted due to AICD firing prior to admission. Hypokalemia: Replacement ordered  Right ankle pain:  X-rays are negative for any acute findings, PT/OT to see patient   Atrial fibrillation with RVR: Patient on Cardizem drip normally on Coumadin. Therapeutic INR achieved 2/12. Add lopressor 2/10. Rate controlled at this time  Mechanical fall: X-rays negative for acute injuries. Likely secondary to alcohol intoxication. Macrocytic anemia: Hemoglobin 10.0, hematocrit 32.3, no active signs of bleeding. Monitor. Mild hypernatremia: resolved;  Monitor, started oral diet   Thrombocytopenia:  Platelets 507. No obvious signs of bleeding. Stable  Chronic kidney disease stage III: Creatinine at baseline. Monitor. Anion gap metabolic acidosis: Secondary to renal failure. Add bicarbonate tabs. CAD: LAD stent placed 10/19/2020, on Brilinta  Diabetes mellitus: Sliding scale insulin, Lantus. Hyperlipidemia: Lipitor 80 mg nightly  Hypertension: Resume home antihypertensives. Tobacco abuse: Nicotine patch  Acute alcohol withdrawal: resolved; recent alcohol binge noted. Patient positive for withdrawal symptoms. Patient receiving phenobarbital and required Precedex sedation and transferred to the ICU on 2/9. Precedex stopped 2/14 patient alert and oriented. Encephalopathy: Resolved; secondary to alcohol withdrawal. S/p Precedex. Improving mentation      INITIAL H AND P AND ICU COURSE:  Lorenza Marcano is a 42-year-old white male who presented to [facility] on 2/7/2022 with complaints of chest pain after AICD fired.   Has a past medical history of reformed smoker, alcohol abuse, heart failure, anxiety, atrial fibrillation, CAD, cardiomyopathy, CKD, diabetes mellitus, GERD, hyperlipidemia, hypertension, AICD placement. Per report patient presented to [facility] after chest pain following his AICD firing. He reported that the device and fired 5 times at home on 2/7/2022. He reported that he had chest pain immediately following these events. He denies chest pain prior. He reports that this is happened to him in the past.  He states he has not been taking his medications for the last 5 to 6 days and had been drinking excessively. Patient states he had drank 1/5 of apple crown whiskey for the past 5 to 6 days. He was agitated upon presentation. He was initially admitted to the hospitalist service. His home medications were resumed. Cardiology was consulted for AICD firing. He developed worsening agitation secondary to withdrawal symptoms on 2/8. CIWA scale was initiated and phenobarbital Dennis protocol the patient continued to have increased symptoms. He was transferred to the ICU for further care. On 2/9 patient continues to require phenobarbital and Precedex. Patient continues to have withdrawal symptoms. Monitor for ability to wean sedatives. Monitor closely for need for intubation. 2/16 patient stable for transition. Patient needs to see a PT OT prior to discharge. Complains of ankle pain and limited weightbearing abilities. X-ray was negative. Handoff to hospitalist.     Past Medical History:  Per HPI  Family History: Mother: diabetes, HTN, stroke Father:   Social History: Reformed smoker, occasional alcohol use, denies drug use    Review of Systems   Constitutional: Negative for fatigue and fever. HENT: Negative for sore throat and trouble swallowing. Eyes: Negative for visual disturbance. Respiratory: Negative for cough, chest tightness and wheezing. Cardiovascular: Negative for leg swelling. Gastrointestinal: Negative for abdominal pain, nausea and vomiting. Endocrine: Negative for polydipsia and polyphagia. Genitourinary: Negative for decreased urine volume and flank pain. Musculoskeletal: Negative for back pain and neck pain. Skin: Negative for color change, pallor and rash. Allergic/Immunologic: Negative for food allergies. Neurological: Positive for weakness (right ankle). Negative for dizziness and numbness. Hematological: Negative for adenopathy. Psychiatric/Behavioral: Negative for agitation and confusion. The patient is not nervous/anxious. Scheduled Meds:   digoxin  125 mcg Oral Daily    metoprolol tartrate  100 mg Oral BID    sodium bicarbonate  650 mg Oral 4x Daily    thiamine  100 mg Oral Daily    potassium bicarb-citric acid  20 mEq Oral Daily    amiodarone  200 mg Oral Daily    [Held by provider] bumetanide  2 mg IntraVENous Daily    folic acid  1 mg IntraMUSCular Daily    sodium chloride flush  5-40 mL IntraVENous 2 times per day    multivitamin  1 tablet Oral Daily    atorvastatin  80 mg Oral Nightly    isosorbide mononitrate  120 mg Oral Daily    ticagrelor  90 mg Oral BID    insulin lispro  0-12 Units SubCUTAneous TID WC    insulin lispro  0-6 Units SubCUTAneous Nightly    hydrALAZINE  100 mg Oral 3 times per day    losartan  50 mg Oral Daily    nicotine  1 patch TransDERmal Q24H    insulin glargine  20 Units SubCUTAneous Nightly    warfarin placeholder: dosing by pharmacy   Other RX Placeholder     Continuous Infusions:   sodium chloride      dextrose         PHYSICAL EXAMINATION:  T:  98.6. P:  97. RR:  16. B/P:  156/94. FiO2:  0. O2 Sat:  97.  I/O:  400/250  Body mass index is 33.77 kg/m². GCS: 15  General:   Acute on chronically ill-appearing white male  HEENT:  normocephalic and atraumatic. No scleral icterus. PERR  Neck: supple. No Thyromegaly. Lungs: clear to auscultation. No retractions  Cardiac: atrial fibrillation. No JVD. Abdomen: soft. Nontender. Extremities:  No clubbing, cyanosis, or edema x 4. Vasculature: capillary refill < 3 seconds. Palpable dorsalis pedis pulses. Skin:  warm and dry. Psych:  Alert and oriented x3. Affect appropriate  Lymph:  No supraclavicular adenopathy. Neurologic:  No focal deficit. No seizures. Data: (All radiographs, tracings, PFTs, and imaging are personally viewed and interpreted unless otherwise noted). Sodium 139, potassium 3.9, chloride 109, CO2 22, BUN 27, creatinine 2.4, anion gap 8.0, glucose 131. WBC 5.0, hemoglobin 10.0, hematocrit 32.3, platelet count 534  Telemetry shows atrial fibrillation   CT head 2/12/2022 reports stable CT  Right ankle x-ray 2/14/2022 reports degenerative changes, no acute process noted  KUB 2/9/2022 reports NG tube in the stomach. Chest x-ray 2/7/2022 reports no acute process  Left knee x-ray 2/7/2022 reports no acute abnormality  Right radial ulnar x-ray 2/7/2022 reports no acute process  Right humerus x-ray 2/7/2022 reports no acute process  Renal ultrasound 1/26/2022 reports probable bilateral renal cyst.  Complex lesion of the left kidney malignancy cannot be excluded. .  EKG 2/7/2022 reports atrial fibrillation with rapid ventricular response  Echocardiogram 2/8/2022 reports ejection fraction 25 to 30% moderate dilation of left atrium       Meets Continued ICU Level Care Criteria:    [] Yes   [x] No - Transfer Planned to listed location: 8A  [x] [facility]     Case and plan discussed with Dr. Josué Kelly. Electronically signed by Gregoria Santos. ISABELLA Palafox - CNP  CRITICAL CARE SPECIALIST  Patient seen by me including key components of medical care. Case discussed with nurse practitioner. Patient stable and ready for transition to medical floor. Italicized font, if present,  represents changes to the note made by me. CC time 20 minutes. Time was discontiguous. Time does not include procedure.  Time does include my direct assessment of the patient and coordination of care. Time represents more than 50% of the time involved with patient care by the [address] [place] [address] team.  Electronically signed by Devon Mcmahan.  Noemi Forbes MD.

## 2022-02-16 NOTE — PROGRESS NOTES
Clinical Pharmacy Note    Warfarin consult follow-up    Recent Labs     02/16/22  0457   INR 1.52*     Recent Labs     02/14/22  0443 02/15/22  0342 02/16/22  0457   HGB 11.0* 10.1* 10.0*   HCT 34.8* 31.8* 32.3*   * 138 123*     Significant Drug-Drug Interactions:  New warfarin drug-drug interactions: None  Discontinued drug-drug interactions: None   Current warfarin drug-drug interactions: ticagrelor (HM), amiodarone ()     Date INR Warfarin Dose   2/7/2022 1.05 7.5 mg   2/8/2022 1.02  not given by RN   2/9/2022 1.33 5 mg   2/10/2022 1.40 6 mg   2/11/2022 1.86 4 mg   2/12/2022 2.85 No warfarin   2/13/2022 3.87 No warfarin   2/14/2022 3.90 1 mg   2/15/2022 3.12 1 mg   2/16/2022 1.52 2.5 mg     Notes:                   PT/INR or POC-INR will be monitored routinely until therapeutic INR is achieved.     Raheem Templeton, PharmD  2/16/2022 11:31 AM

## 2022-02-17 VITALS
DIASTOLIC BLOOD PRESSURE: 74 MMHG | HEIGHT: 74 IN | WEIGHT: 263.01 LBS | RESPIRATION RATE: 18 BRPM | OXYGEN SATURATION: 98 % | HEART RATE: 94 BPM | BODY MASS INDEX: 33.75 KG/M2 | TEMPERATURE: 98.5 F | SYSTOLIC BLOOD PRESSURE: 128 MMHG

## 2022-02-17 LAB
ANION GAP SERPL CALCULATED.3IONS-SCNC: 11 MEQ/L (ref 8–16)
BUN BLDV-MCNC: 25 MG/DL (ref 7–22)
CALCIUM SERPL-MCNC: 7.7 MG/DL (ref 8.5–10.5)
CHLORIDE BLD-SCNC: 108 MEQ/L (ref 98–111)
CO2: 21 MEQ/L (ref 23–33)
CREAT SERPL-MCNC: 2.3 MG/DL (ref 0.4–1.2)
ERYTHROCYTE [DISTWIDTH] IN BLOOD BY AUTOMATED COUNT: 14.2 % (ref 11.5–14.5)
ERYTHROCYTE [DISTWIDTH] IN BLOOD BY AUTOMATED COUNT: 51.4 FL (ref 35–45)
GFR SERPL CREATININE-BSD FRML MDRD: 29 ML/MIN/1.73M2
GLUCOSE BLD-MCNC: 115 MG/DL (ref 70–108)
GLUCOSE BLD-MCNC: 196 MG/DL (ref 70–108)
HCT VFR BLD CALC: 32.2 % (ref 42–52)
HEMOGLOBIN: 10.2 GM/DL (ref 14–18)
INR BLD: 1.15 (ref 0.85–1.13)
MCH RBC QN AUTO: 32.5 PG (ref 26–33)
MCHC RBC AUTO-ENTMCNC: 31.7 GM/DL (ref 32.2–35.5)
MCV RBC AUTO: 102.5 FL (ref 80–94)
PLATELET # BLD: 139 THOU/MM3 (ref 130–400)
PMV BLD AUTO: 10.7 FL (ref 9.4–12.4)
POTASSIUM SERPL-SCNC: 4.1 MEQ/L (ref 3.5–5.2)
RBC # BLD: 3.14 MILL/MM3 (ref 4.7–6.1)
SODIUM BLD-SCNC: 140 MEQ/L (ref 135–145)
WBC # BLD: 4.6 THOU/MM3 (ref 4.8–10.8)

## 2022-02-17 PROCEDURE — 85610 PROTHROMBIN TIME: CPT

## 2022-02-17 PROCEDURE — 2500000003 HC RX 250 WO HCPCS: Performed by: STUDENT IN AN ORGANIZED HEALTH CARE EDUCATION/TRAINING PROGRAM

## 2022-02-17 PROCEDURE — 36415 COLL VENOUS BLD VENIPUNCTURE: CPT

## 2022-02-17 PROCEDURE — 99239 HOSP IP/OBS DSCHRG MGMT >30: CPT | Performed by: PHYSICIAN ASSISTANT

## 2022-02-17 PROCEDURE — 85027 COMPLETE CBC AUTOMATED: CPT

## 2022-02-17 PROCEDURE — 97116 GAIT TRAINING THERAPY: CPT

## 2022-02-17 PROCEDURE — 82948 REAGENT STRIP/BLOOD GLUCOSE: CPT

## 2022-02-17 PROCEDURE — 6370000000 HC RX 637 (ALT 250 FOR IP): Performed by: NURSE PRACTITIONER

## 2022-02-17 PROCEDURE — 6370000000 HC RX 637 (ALT 250 FOR IP): Performed by: PHYSICIAN ASSISTANT

## 2022-02-17 PROCEDURE — 6370000000 HC RX 637 (ALT 250 FOR IP): Performed by: HOSPITALIST

## 2022-02-17 PROCEDURE — 2580000003 HC RX 258: Performed by: PHYSICIAN ASSISTANT

## 2022-02-17 PROCEDURE — 97161 PT EVAL LOW COMPLEX 20 MIN: CPT

## 2022-02-17 PROCEDURE — 80048 BASIC METABOLIC PNL TOTAL CA: CPT

## 2022-02-17 PROCEDURE — 6370000000 HC RX 637 (ALT 250 FOR IP): Performed by: STUDENT IN AN ORGANIZED HEALTH CARE EDUCATION/TRAINING PROGRAM

## 2022-02-17 RX ORDER — WARFARIN SODIUM 5 MG/1
5 TABLET ORAL ONCE
Status: DISCONTINUED | OUTPATIENT
Start: 2022-02-17 | End: 2022-02-17 | Stop reason: HOSPADM

## 2022-02-17 RX ADMIN — Medication 100 MG: at 08:17

## 2022-02-17 RX ADMIN — METOPROLOL 100 MG: 100 TABLET ORAL at 08:17

## 2022-02-17 RX ADMIN — DIGOXIN 125 MCG: 125 TABLET ORAL at 08:23

## 2022-02-17 RX ADMIN — ISOSORBIDE MONONITRATE 120 MG: 60 TABLET, EXTENDED RELEASE ORAL at 08:18

## 2022-02-17 RX ADMIN — POTASSIUM BICARBONATE 20 MEQ: 782 TABLET, EFFERVESCENT ORAL at 08:17

## 2022-02-17 RX ADMIN — TICAGRELOR 90 MG: 90 TABLET ORAL at 08:20

## 2022-02-17 RX ADMIN — SODIUM BICARBONATE 650 MG: 650 TABLET ORAL at 08:17

## 2022-02-17 RX ADMIN — SODIUM CHLORIDE, PRESERVATIVE FREE 10 ML: 5 INJECTION INTRAVENOUS at 08:24

## 2022-02-17 RX ADMIN — LOSARTAN POTASSIUM 50 MG: 50 TABLET, FILM COATED ORAL at 08:18

## 2022-02-17 RX ADMIN — BUMETANIDE 2 MG: 1 TABLET ORAL at 08:18

## 2022-02-17 RX ADMIN — AMIODARONE HYDROCHLORIDE 200 MG: 200 TABLET ORAL at 08:18

## 2022-02-17 RX ADMIN — HYDRALAZINE HYDROCHLORIDE 100 MG: 50 TABLET, FILM COATED ORAL at 05:10

## 2022-02-17 RX ADMIN — Medication 1 TABLET: at 08:18

## 2022-02-17 RX ADMIN — FOLIC ACID 1 MG: 5 INJECTION, SOLUTION INTRAMUSCULAR; INTRAVENOUS; SUBCUTANEOUS at 08:20

## 2022-02-17 ASSESSMENT — PAIN SCALES - GENERAL
PAINLEVEL_OUTOF10: 0
PAINLEVEL_OUTOF10: 0

## 2022-02-17 NOTE — PLAN OF CARE
Problem: Falls - Risk of:  Goal: Will remain free from falls  Description: Will remain free from falls  2/16/2022 2333 by Twila Shane RN  Outcome: Met This Shift  Note: Patient remained free from falls this shift. Used call light appropriately. Wore nonskid socks when ambulating with assistance. Bed alarm on. Call light in reach. Problem: Falls - Risk of:  Goal: Absence of physical injury  Description: Absence of physical injury  2/16/2022 2333 by Twila Shane RN  Outcome: Met This Shift  Note: Patient remained free from physical injury this shift. Used call light appropriately. Wore nonskid socks when ambulating with assistance. Bed alarm on. Call light in reach. Pathway clear. Problem: Skin Integrity:  Goal: Will show no infection signs and symptoms  Description: Will show no infection signs and symptoms  2/16/2022 2333 by Twila Shane RN  Outcome: Met This Shift  Note: Afebrile. No s/s of infection noted so far this shift. Problem: Skin Integrity:  Goal: Absence of new skin breakdown  Description: Absence of new skin breakdown  2/16/2022 2333 by Twila Shane RN  Outcome: Met This Shift  Note: Patient remained free from skin breakdown this shift. Patient turned self Q2H and PRN in bed. Pillow support provided. Low-intermittent air loss mattress provided. Will continue to monitor skin integrity and encourage repositioning Q2H and PRN. Problem: Nutrition  Goal: Optimal nutrition therapy  2/16/2022 2333 by Twila Shane RN  Outcome: Met This Shift  Note: Patient tolerating PO fluids and meals. Problem: Pain:  Goal: Pain level will decrease  Description: Pain level will decrease  2/16/2022 2333 by Twila Shane RN  Outcome: Ongoing  Note: Patient c/o pain this shift. Pain medication per MAR. Non-pharmacological pain mgmt- rest, reposition, distraction, elevation, emotional support, etc provided. Patient satisfied with pain mgmt.       Problem: Discharge Planning:  Goal: Discharged to appropriate level of care  Description: Discharged to appropriate level of care  2/16/2022 2333 by Minoo Aldridge RN  Outcome: Ongoing  Note: Patient from home, plans to return home tomorrow. Plan for PT/OT to evaluate prior to discharge. Will continue to monitor for discharge needs. Patient participated in plan of care and contributed to goal setting.

## 2022-02-17 NOTE — PROGRESS NOTES
Clinical Pharmacy Note    Warfarin consult follow-up    Recent Labs     02/17/22  0544   INR 1.15*     Recent Labs     02/15/22  0342 02/16/22  0457 02/17/22  0544   HGB 10.1* 10.0* 10.2*   HCT 31.8* 32.3* 32.2*    123* 139     Significant Drug-Drug Interactions:  New warfarin drug-drug interactions: none  Discontinued drug-drug interactions: none  Current warfarin drug-drug interactions: ticagrelor (), amiodarone ()     Date INR Warfarin Dose   2/7/2022 1.05 7.5 mg   2/8/2022 1.02  not given by RN   2/9/2022 1.33 5 mg   2/10/2022 1.40 6 mg   2/11/2022 1.86 4 mg   2/12/2022 2.85 No warfarin   2/13/2022 3.87 No warfarin   2/14/2022 3.90 1 mg   2/15/2022 3.12 1 mg   2/16/2022 1.52 2.5 mg   2/17/2022 1.15 5 mg                    Notes:                   PT/INR or POC-INR will be monitored routinely until therapeutic INR is achieved.     Bernie Patel PharmD 2/17/2022 7:56 AM

## 2022-02-17 NOTE — CARE COORDINATION
2/17/22, 11:23 AM EST    Patient goals/plan/ treatment preferences discussed by  and . Patient goals/plan/ treatment preferences reviewed with patient/ family. Patient/ family verbalize understanding of discharge plan and are in agreement with goal/plan/treatment preferences. Understanding was demonstrated using the teach back method. AVS provided by RN at time of discharge, which includes all necessary medical information pertaining to the patients current course of illness, treatment, post-discharge goals of care, and treatment preferences. PT has seen this morning and no home discharge therapy needs recommended. Plans return home alone with family support.

## 2022-02-17 NOTE — DISCHARGE SUMMARY
Hospitalist Discharge Summary        Patient: Haleigh Watson  YOB: 1965  MRN: [id number]   Acct: [de-identified]    Primary Care Physician: ISABELLA Quintana - CNP    Admit date  2/7/2022    Discharge date: 2/17/2022 12:34 PM    Chief Complaint on presentation :-  AICD firing / CP     Discharge Assessment and Plan:-   1. A. fib with RVR on [address], resolved: Confirmed on EKG. Patient started on a Cardizem drip in ED, continue. Noted to be on Cordarone, and Mexitil at home, hold these for now. Resume Metoprolol. Appreciate cardiology input. Continue with warfarin, pharmacy to dose. Telemetry monitor. 2/17: Patient to continue amio. New prescription for increase Lopressor given. Okay to continue Bumex and repeat BMP as outpatient. Prescribed low-dose digoxin. Patient to follow-up with cardiology in 3 to 4 weeks  2. Chest pain secondary to AICD firing / Afib with RVR: Ruled out ACS. Continue to trend troponin. Cardiology consulted. 3. HFrEF/severe CDMP: EF 25 to 30% s/p AICD. 4. Elevated troponin, chronic: Patient has chronic troponin elevation, however this value elevated compared to previous. Likely secondary to AICD firing. Will trend. EKG shows A. fib with RVR, nonspecific ST and T wave abnormality. Cardiologist has been consulted. 5. AG metabolic acidosis: repeat BMP and monitor s/p IVFs, improved  6. Alcohol withdrawal:  Patient required Precedex in ICU. Resources have been given. Daily folic acid, thiamine, MTV.   7. Metabolic encephalopathy secondary to #6: resolved  8. Essential hypertension: Patient is on multiple agents, including Cardura, Norvasc, Imdur, Lopressor, hydralazine, Cozaar, and metoprolol. Close monitoring. 9. CAD s/p CABG: Continue Brilinta, beta-blocker, statin, Imdur  10. CKD stage III: Creatinine noted at 2.1, this is improving previous value 1/22 at 2.7. Avoid nephrotoxic agents. 2/17: Cr 2.3 on day of discharge.  Repeat Bmp and f/u with Nephrology as outpatient. Appointment scheduled. 11. IDDM II, uncontrolled: Continue with home insulin. SSI. Accu. Hypoglycemia protocol in place. Check hemoglobin A1c, most recent value 8.1 in 11/21. 12. Anxiety: Resume patient's home Xanax  13. Acute left knee pain: Patient underwent imaging in the ED which was negative for acute fracture. Apply heat. Tylenol. PT/OT. 14. S/p mechanical fall: Patient denies any loss of consciousness or striking his head. He slipped on ice. PT/OT. No acute fractures noted on imaging  15. Disposition: Patient was evaluated by PT prior to his discharge and is okay for return home. Initial H and P and Hospital course:-  Patient is a 66-year-old male with past medical history of alcohol abuse, and acute cells CHF, anxiety, CAD, A. fib on [address] [place] [address] Road, HLD, essential hypertension, and CKD who presents to [facility] ED with chief complaint of chest pain secondary to his AICD firing. History obtained via patient and chart review. Patient is a poor historian.     Patient reports that approximately 11:00 this morning he felt his AICD fire. Within the next hour, patient states that his device fired up to 5 times. Patient relates that he has not been taking his medications for the past 5 to 6 days as he has been drinking. Patient reports this is happened in the past, and relates it to his history of drinking. Patient states that he has been on a binge, drinking about 1/5 of apple crown whiskey for the past 5 to 6 days. Patient reports that on Saturday he fell on ice which led to left knee pain. Patient is on Coumadin, he denies striking his head or any loss of consciousness. Patient denies any other symptoms, including fever/chills, nausea/vomiting/diarrhea. He reports chest pain this morning when he felt his AICD fire, however this has improved. Patient denies shortness of breath. Patient reports left knee pain.     Upon exam, patient is agitated and fidgeting.   He reports that he has severe anxiety and does not want to be admitted. Discussed with patient that if he did not want admission that he would have to sign out AMA given his condition. Patient will be admitted to stepdown with cardiology consult. Patient was admitted 2/7 due to chest pain and reported AICD firing. Patient was also noted to be acutely intoxicated was at severe risk of withdrawal with heavy binge drinking history. Patient at that time was found to be in A. fib with RVR and was admitted with cardiology consult. The day after admission, patient was requiring high levels of phenobarbital without improvement of his withdrawal symptoms and was transferred to the ICU for Precedex. Patient was noted to be acutely encephalopathic. Over the course of his admission, patient's mentation improved and he was taken off the Precedex drip 2/14. Pt complained of not being able to bear weight on his ankle and pain associated with that and therefore PT evaluation was required. All xrays were neg for acute process. Pt was transferred to the medical floor 2/16 due for \"physical therapy\" and clearance however was medically stable for discharge otherwise. PT was unable to see the patient on that day and therefore discharge was withheld pending their evaluation after pt's prolonged stay. On 2/17 PT evaluated patient who recommended home independently.   All VSS and patient is stable for follow-up with PCP, Cardiology and nephrology.        Physical Exam:-  Vitals:   Patient Vitals for the past 24 hrs:   BP Temp Temp src Pulse Resp SpO2   02/17/22 1048 128/74 98.5 °F (36.9 °C) Oral 94 18 98 %   02/17/22 0802 (!) 146/87 99.3 °F (37.4 °C) Oral 80 16 97 %   02/17/22 0335 (!) 145/79 97.9 °F (36.6 °C) Oral 92 16 97 %   02/16/22 2300 (!) 147/72 -- -- 75 16 99 %   02/16/22 2000 (!) 137/96 98.3 °F (36.8 °C) Oral 89 18 100 %   02/16/22 1532 (!) 167/77 97.9 °F (36.6 °C) Oral 99 18 98 %   02/16/22 1157 -- -- -- -- 16 98 %   02/16/22 1145 (!) 157/84 98.5 °F (36.9 °C) Oral 84 18 98 %     Weight:   Weight: 263 lb 0.1 oz (119.3 kg)   24 hour intake/output:     Intake/Output Summary (Last 24 hours) at 2/17/2022 1102  Last data filed at 2/17/2022 0335  Gross per 24 hour   Intake 1280 ml   Output --   Net 1280 ml       1. General appearance: obese, pleasant, No apparent distress, appears stated age and cooperative. 2. HEENT: Normal cephalic, atraumatic without obvious deformity. Pupils equal, round, and reactive to light. Extra ocular muscles intact. Conjunctivae/corneas clear. 3. Neck: Supple, with full range of motion. No jugular venous distention. Trachea midline. 4. Respiratory:  Normal respiratory effort. Clear to auscultation, bilaterally without Rales/Wheezes/Rhonchi. 5. Cardiovascular: IRIR with normal S1/S2 without murmurs, rubs or gallops. 6. Abdomen: Soft, non-tender, non-distended with normal bowel sounds. 7. Musculoskeletal:  No clubbing, cyanosis or edema bilaterally. 8. Skin: Skin color, texture, turgor normal.  No rashes or lesions. Area scattered ecchymosis  9. Neurologic:  Neurovascularly intact without any focal sensory/motor deficits. Cranial nerves: II-XII intact, grossly non-focal.  10. Psychiatric: Alert and oriented, thought content appropriate, normal insight  11. Capillary Refill: Brisk,< 3 seconds   12.  Peripheral Pulses: +2 palpable, equal bilaterally       Discharge Medications:-      Medication List      START taking these medications    digoxin 125 MCG tablet  Commonly known as: LANOXIN  Take 1 tablet by mouth daily     multivitamin Tabs tablet  Take 1 tablet by mouth daily     thiamine 100 MG tablet  Take 1 tablet by mouth daily        CHANGE how you take these medications    metoprolol 100 MG tablet  Commonly known as: LOPRESSOR  Take 1 tablet by mouth daily Hold for SBP less than 100 mmHg, if HR less than 55 bpm  What changed:   · medication strength  · how much to take  · when to take this  · additional instructions        CONTINUE taking these medications    Acetaminophen 650 MG Tabs  Take 650 mg by mouth every 4 hours as needed     ALPRAZolam 0.5 MG tablet  Commonly known as: XANAX  TAKE 1 TABLET BY MOUTH AT BEDTIME FOR ANXIETY     amiodarone 200 MG tablet  Commonly known as: CORDARONE  Take 1 tablet by mouth daily     amLODIPine 5 MG tablet  Commonly known as: NORVASC  Take 1 tablet by mouth 2 times daily     atorvastatin 80 MG tablet  Commonly known as: LIPITOR  TAKE 1 TABLET BY MOUTH EVERY NIGHT     * blood glucose test strips strip  Commonly known as: William Contour Test  1 each by In Vitro route daily     * OneTouch Ultra strip  Generic drug: blood glucose test strips  USE AS DIRECTED TWICE DAILY     bumetanide 2 MG tablet  Commonly known as: BUMEX  TAKE 1 TABLET BY MOUTH DAILY     doxazosin 2 MG tablet  Commonly known as: Cardura  Take 1 tablet by mouth daily     hydrALAZINE 100 MG tablet  Commonly known as: APRESOLINE  TAKE 1 TABLET BY MOUTH THREE TIMES DAILY     Insulin Pen Needle 31G X 8 MM Misc  1 each by Does not apply route daily     isosorbide mononitrate 120 MG extended release tablet  Commonly known as: IMDUR  TAKE 1 TABLET BY MOUTH DAILY     Lantus SoloStar 100 UNIT/ML injection pen  Generic drug: insulin glargine  Inject 20 Units into the skin nightly Increase 2 units until morning sugar > 150     linagliptin 5 MG tablet  Commonly known as: Tradjenta  TAKE 1 TABLET BY MOUTH DAILY     losartan 50 MG tablet  Commonly known as: COZAAR  TAKE 1 TABLET BY MOUTH DAILY     mexiletine 150 MG capsule  Commonly known as: MEXITIL  TAKE 2 CAPSULES THREE TIMES DAILY FOR ATRIAL FIBRILLATION     nitroGLYCERIN 0.4 MG SL tablet  Commonly known as: Nitrostat  Place 1 tablet under the tongue every 5 minutes as needed for Chest pain X 3 doses. If chest pain continues seek medical attention     ONE TOUCH ULTRA 2 w/Device Kit  1 kit by Does not apply route 2 times daily     ONE TOUCH ULTRASOFT LANCETS Misc  USE AS DIRECTED TWICE DAILY. potassium chloride 20 MEQ extended release tablet  Commonly known as: KLOR-CON M  Take 1 tablet by mouth daily     ticagrelor 90 MG Tabs tablet  Commonly known as: BRILINTA  Take 1 tablet by mouth 2 times daily     warfarin 5 MG tablet  Commonly known as: COUMADIN  USE AS DIRECTED BY COUMADIN CLINIC         * This list has 2 medication(s) that are the same as other medications prescribed for you. Read the directions carefully, and ask your doctor or other care provider to review them with you.                Where to Get Your Medications      These medications were sent to [facility] [address], [address], [address] [place] [id number]    Phone: [phone]   · digoxin 125 MCG tablet  · metoprolol 100 MG tablet  · multivitamin Tabs tablet  · thiamine 100 MG tablet          Labs :-  Recent Results (from the past 72 hour(s))   POCT glucose    Collection Time: 02/14/22 12:52 PM   Result Value Ref Range    POC Glucose 191 (H) 70 - 108 mg/dl   POCT glucose    Collection Time: 02/14/22  5:26 PM   Result Value Ref Range    POC Glucose 157 (H) 70 - 108 mg/dl   POCT glucose    Collection Time: 02/14/22  9:22 PM   Result Value Ref Range    POC Glucose 182 (H) 70 - 108 mg/dl   Protime-INR    Collection Time: 02/15/22  3:42 AM   Result Value Ref Range    INR 3.12 (H) 0.85 - 2.43   Basic Metabolic Panel    Collection Time: 02/15/22  3:42 AM   Result Value Ref Range    Sodium 141 135 - 145 meq/L    Potassium 3.2 (L) 3.5 - 5.2 meq/L    Chloride 108 98 - 111 meq/L    CO2 20 (L) 23 - 33 meq/L    Glucose 172 (H) 70 - 108 mg/dL    BUN 30 (H) 7 - 22 mg/dL    CREATININE 2.8 (H) 0.4 - 1.2 mg/dL    Calcium 7.4 (L) 8.5 - 10.5 mg/dL   CBC    Collection Time: 02/15/22  3:42 AM   Result Value Ref Range    WBC 5.2 4.8 - 10.8 thou/mm3    RBC 3.13 (L) 4.70 - 6.10 mill/mm3    Hemoglobin 10.1 (L) 14.0 - 18.0 gm/dl    Hematocrit 31.8 (L) 42.0 - 52.0 %    MCV 101.6 (H) 80.0 - 94.0 fL    MCH 32.3 26.0 - 33.0 pg    MCHC 31.8 (L) 32.2 - 35.5 gm/dl    RDW-CV 14.5 11.5 - 14.5 %    RDW-SD 53.1 (H) 35.0 - 45.0 fL    Platelets 115 975 - 223 thou/mm3    MPV 10.4 9.4 - 12.4 fL   Anion Gap    Collection Time: 02/15/22  3:42 AM   Result Value Ref Range    Anion Gap 13.0 8.0 - 16.0 meq/L   Glomerular Filtration Rate, Estimated    Collection Time: 02/15/22  3:42 AM   Result Value Ref Range    Est, Glom Filt Rate 23 (A) ml/min/1.73m2   POCT glucose    Collection Time: 02/15/22  8:06 AM   Result Value Ref Range    POC Glucose 119 (H) 70 - 108 mg/dl   POCT glucose    Collection Time: 02/15/22 12:20 PM   Result Value Ref Range    POC Glucose 177 (H) 70 - 108 mg/dl   POCT glucose    Collection Time: 02/15/22  4:59 PM   Result Value Ref Range    POC Glucose 228 (H) 70 - 108 mg/dl   POCT glucose    Collection Time: 02/15/22  7:22 PM   Result Value Ref Range    POC Glucose 229 (H) 70 - 108 mg/dl   Protime-INR    Collection Time: 02/16/22  4:57 AM   Result Value Ref Range    INR 1.52 (H) 0.85 - 3.86   Basic Metabolic Panel    Collection Time: 02/16/22  4:57 AM   Result Value Ref Range    Sodium 139 135 - 145 meq/L    Potassium 3.9 3.5 - 5.2 meq/L    Chloride 109 98 - 111 meq/L    CO2 22 (L) 23 - 33 meq/L    Glucose 131 (H) 70 - 108 mg/dL    BUN 27 (H) 7 - 22 mg/dL    CREATININE 2.4 (H) 0.4 - 1.2 mg/dL    Calcium 7.5 (L) 8.5 - 10.5 mg/dL   CBC    Collection Time: 02/16/22  4:57 AM   Result Value Ref Range    WBC 5.0 4.8 - 10.8 thou/mm3    RBC 3.06 (L) 4.70 - 6.10 mill/mm3    Hemoglobin 10.0 (L) 14.0 - 18.0 gm/dl    Hematocrit 32.3 (L) 42.0 - 52.0 %    .6 (H) 80.0 - 94.0 fL    MCH 32.7 26.0 - 33.0 pg    MCHC 31.0 (L) 32.2 - 35.5 gm/dl    RDW-CV 14.3 11.5 - 14.5 %    RDW-SD 53.4 (H) 35.0 - 45.0 fL    Platelets 182 (L) 270 - 400 thou/mm3    MPV 10.5 9.4 - 12.4 fL   Anion Gap    Collection Time: 02/16/22  4:57 AM   Result Value Ref Range    Anion Gap 8.0 8.0 - 16.0 meq/L   Glomerular Filtration Rate, Estimated    Collection Time: 02/16/22  4:57 AM   Result Value Ref Range    Est, Glom Filt Rate 28 (A) ml/min/1.73m2   POCT glucose    Collection Time: 02/16/22  7:38 AM   Result Value Ref Range    POC Glucose 105 70 - 108 mg/dl   POCT glucose    Collection Time: 02/16/22 12:30 PM   Result Value Ref Range    POC Glucose 162 (H) 70 - 108 mg/dl   Digoxin Level    Collection Time: 02/16/22  3:01 PM   Result Value Ref Range    Digoxin Lvl 0.4 (L) 0.5 - 2.0 ng/mL   POCT glucose    Collection Time: 02/16/22  5:00 PM   Result Value Ref Range    POC Glucose 271 (H) 70 - 108 mg/dl   POCT glucose    Collection Time: 02/16/22  8:20 PM   Result Value Ref Range    POC Glucose 153 (H) 70 - 108 mg/dl   Protime-INR    Collection Time: 02/17/22  5:44 AM   Result Value Ref Range    INR 1.15 (H) 0.85 - 3.67   Basic Metabolic Panel    Collection Time: 02/17/22  5:44 AM   Result Value Ref Range    Sodium 140 135 - 145 meq/L    Potassium 4.1 3.5 - 5.2 meq/L    Chloride 108 98 - 111 meq/L    CO2 21 (L) 23 - 33 meq/L    Glucose 196 (H) 70 - 108 mg/dL    BUN 25 (H) 7 - 22 mg/dL    CREATININE 2.3 (H) 0.4 - 1.2 mg/dL    Calcium 7.7 (L) 8.5 - 10.5 mg/dL   CBC    Collection Time: 02/17/22  5:44 AM   Result Value Ref Range    WBC 4.6 (L) 4.8 - 10.8 thou/mm3    RBC 3.14 (L) 4.70 - 6.10 mill/mm3    Hemoglobin 10.2 (L) 14.0 - 18.0 gm/dl    Hematocrit 32.2 (L) 42.0 - 52.0 %    .5 (H) 80.0 - 94.0 fL    MCH 32.5 26.0 - 33.0 pg    MCHC 31.7 (L) 32.2 - 35.5 gm/dl    RDW-CV 14.2 11.5 - 14.5 %    RDW-SD 51.4 (H) 35.0 - 45.0 fL    Platelets 543 864 - 377 thou/mm3    MPV 10.7 9.4 - 12.4 fL   Anion Gap    Collection Time: 02/17/22  5:44 AM   Result Value Ref Range    Anion Gap 11.0 8.0 - 16.0 meq/L   Glomerular Filtration Rate, Estimated    Collection Time: 02/17/22  5:44 AM   Result Value Ref Range    Est, Glom Filt Rate 29 (A) ml/min/1.73m2   POCT glucose    Collection Time: 02/17/22  8:29 AM   Result Value Ref Range    POC Glucose 115 (H) 70 - 108 mg/dl        Microbiology:    Blood culture #1:   Lab Results   Component Value Date    BC No growth-preliminary  No growth   02/03/2019       Blood culture #2:No results found for: Paul Mullen    Organism:    Lab Results   Component Value Date    LABGRAM  06/30/2017     No segmented neutrophils observed. No epithelial cells observed. No bacteria seen. MRSA culture only:No results found for: [facility]    Urine culture:   Lab Results   Component Value Date    LABURIN  12/20/2017     Mixed growth. The mixture of organisms present represents  both organisms that may cause urinary tract infections and  organisms that are not a common cause of urinary tract  infections and are possibly skin titus or distal urethral  titus.        Lab Results   Component Value Date    ORG Escherichia coli 05/01/2018        Respiratory culture: No results found for: CULTRESP    Aerobic and Anaerobic :  Lab Results   Component Value Date    LABAERO No growth-preliminary  No growth   06/30/2017     Lab Results   Component Value Date    LABANAE No growth-preliminary  No growth   06/30/2017       Urinalysis:      Lab Results   Component Value Date    NITRU NEGATIVE 01/20/2022    WBCUA 0-2 01/20/2022    BACTERIA NONE SEEN 01/20/2022    RBCUA 0-2 01/20/2022    BLOODU NEGATIVE 01/20/2022    SPECGRAV 1.018 01/20/2022    GLUCOSEU NEGATIVE 09/24/2019       Radiology:-  ECHO Complete 2D W Doppler W Color    Result Date: 2/8/2022  Transthoracic Echocardiography Report (TTE)  Demographics   Patient Name     Roque Alonso Gender                 Male   MR #             [id number]     Race                                                     Ethnicity   Account #        [de-identified]     Room Number            4958   Accession Number [id number]    Date of Study          02/08/2022   Date of Birth    1965    Referring Physician    BON Dowell [place] Age              64 year(s)    Sonographer            [facility]                                  Interpreting Physician Jd Lamb MD  Procedure Type of Study   TTE procedure:ECHOCARDIOGRAM COMPLETE 2D W DOPPLER W COLOR. Procedure Date Date: 02/08/2022 Start: 09:41 AM Study Location: Bedside Technical Quality: Limited visualization due to restricted mobility. Indications:Atrial fibrillation and AICD Discharge. Additional Medical History:Alcohol withdrawal, Drug use, V-tach, BABAK, Osteoarthritis, Tobacco use, Coronary artery disease, CABG, NSTEMI, Stent, ICD, Hyperlipidemia, Pulmonary edema, CHF, Diabetes, Hypertension, Cardiomyopathy Patient Status: Routine Height: 73.62 inches Weight: 280.01 pounds BSA: 2.5 m^2 BMI: 36.32 kg/m^2 BP: 159/90 mmHg Allergies   - See Epic. Conclusions   Summary  Technically difficult examination. Left ventricular size is mildly dilated and systolic function is severely  reduced. Ejection fraction was estimated at 25-30%. LV wall thickness is  within normal limits. The left atrium is moderately dilated. Device lead seen in the right heart. Signature   ----------------------------------------------------------------  Electronically signed by Jd Lamb MD (Interpreting  physician) on 02/08/2022 at 12:42 PM  ----------------------------------------------------------------   Findings   Mitral Valve  The mitral valve structure was normal with normal leaflet separation. DOPPLER: The transmitral velocity was within the normal range with no  evidence for mitral stenosis. Mild mitral regurgitation is present. Aortic Valve  The aortic valve was trileaflet with normal thickness and cuspal  separation. DOPPLER: Transaortic velocity was within the normal range with  no evidence of aortic stenosis. There was no evidence of aortic  regurgitation. Tricuspid Valve  The tricuspid valve structure was normal with normal leaflet separation.   DOPPLER: There was no evidence of tricuspid stenosis. Pulmonic Valve  The pulmonic valve was not well visualized . Left Atrium  The left atrium is moderately dilated. Left Ventricle  Left ventricular size is mildly dilated and systolic function is severely  reduced. Ejection fraction was estimated at 25-30%. LV wall thickness is  within normal limits. Right Atrium  Right atrial size was normal.   Right Ventricle  The right ventricular size was normal with normal systolic function and  wall thickness. Device lead seen in the right heart. Pericardial Effusion  The pericardium was normal in appearance with no evidence of a pericardial  effusion. Pleural Effusion  No evidence of pleural effusion. Aorta / Great Vessels  -Aortic root dimension within normal limits.   -IVC not visualized  M-Mode/2D Measurements & Calculations   LV Diastolic   LV Systolic Dimension:    AV Cusp Separation: 1.9 cmLA  Dimension: 6.1 5.6 cm                    Dimension: 4.5 cmAO Root  cm             LV Volume Diastolic: 641  Dimension: 3.4 cmLA Area: 24 cm^2  LV FS:8.2 %    ml  LV PW          LV Volume Systolic: 972.7  Diastolic: 0.9 ml  cm             LV EDV/LV EDV Index: 347  RV Diastolic Dimension: 2.9 cm  Septum         ml/62 m^2LV ESV/LV ESV  Diastolic: 0.8 Index: 815.3 ml/46 m^2    LA/Aorta: 1.32  cm             EF Calculated: 25.7 %                                           LA volume/Index: 70.1 ml /28m^2                 LV Length: 8.8 cm   LV Area  Diastolic:  35.2 cm^2  LV Area  Systolic: 79.3  cm^2  Doppler Measurements & Calculations   MV Peak E-Wave: 10.3    AV Peak Velocity: 121   LVOT Peak Velocity: 94.3  cm/s                    cm/s                    cm/s                          AV Peak Gradient: 5.86  LVOT Peak Gradient: 4 mmHg  MV Peak Gradient: 0.04  mmHg  mmHg                                            TV Peak E-Wave: 69 cm/s                                                   TV Peak Gradient: 1.9 mmHg                           IVRT: 95 msec PV Peak Velocity: 78.8                                                  cm/s                                                  PV Peak Gradient: 2.48  MR Velocity: 329 cm/s   AV DVI (Vmax):0.78      mmHg                                                   KS ED Velocity: 193 cm/s  [email or web address]    XR HUMERUS RIGHT (MIN 2 VIEWS)    Result Date: 2/7/2022  PROCEDURE: XR HUMERUS RIGHT (MIN 2 VIEWS) CLINICAL INFORMATION: fall, pain . TECHNIQUE: AP and lateral projections COMPARISON: No prior study. FINDINGS: No acute fracture or dislocation. Small ossific density adjacent to the lateral humeral head likely calcific tendinitis or bursitis. No soft tissue abnormality identified. IV line At the antecubital space. No acute process **This report has been created using voice recognition software. It may contain minor errors which are inherent in voice recognition technology. ** Final report electronically signed by Dr. Juhi Billings on 2/7/2022 2:25 PM    XR RADIUS ULNA RIGHT (2 VIEWS)    Result Date: 2/7/2022  PROCEDURE: XR RADIUS ULNA RIGHT (2 VIEWS) CLINICAL INFORMATION: fall . TECHNIQUE: AP and lateral COMPARISON: No prior study. FINDINGS: There is no fracture dislocation artifacts from patient's IV at the antecubital space overlapping the upper forearm. Vascular calcifications are noted. No other soft tissue abnormality is seen. No acute process **This report has been created using voice recognition software. It may contain minor errors which are inherent in voice recognition technology. ** Final report electronically signed by Dr. Juhi Billings on 2/7/2022 2:34 PM    XR KNEE LEFT (MIN 4 VIEWS)    Result Date: 2/7/2022  PROCEDURE: XR KNEE LEFT (MIN 4 VIEWS) CLINICAL INFORMATION: patient fell . TECHNIQUE: 4 projections COMPARISON: No prior study. FINDINGS: No fracture or dislocation.  Clothing artifacts. Bipartite patella. No acute fracture or dislocation. No soft tissue abnormality. Surgical clips are noted in the medial posterior soft tissues. No acute abnormality **This report has been created using voice recognition software. It may contain minor errors which are inherent in voice recognition technology. ** Final report electronically signed by Dr. Myla Ramirez on 2/7/2022 2:31 PM    XR CHEST LATERAL    Result Date: 2/7/2022  PROCEDURE: XR CHEST LATERAL CLINICAL INFORMATION: fall, aicd firing . TECHNIQUE: Lateral projections of the chest COMPARISON: 9/24/2019 FINDINGS: The pacer leads appear unchanged from prior exam no infiltrates or effusions. Bone detail is limited no gross acute abnormality is seen. No acute process **This report has been created using voice recognition software. It may contain minor errors which are inherent in voice recognition technology. ** Final report electronically signed by Dr. Myla Ramirez on 2/7/2022 2:33 PM    XR CHEST PORTABLE    Result Date: 2/7/2022  PROCEDURE: XR CHEST PORTABLE CLINICAL INFORMATION: Chest pain. COMPARISON: Chest x-ray 10/19/2020. TECHNIQUE: AP portable chest radiograph performed. FINDINGS: Lines/tubes: A left chest permanent pacemaker is stable. Heart/mediastinum: Cardiomegaly and median sternotomy wires are unchanged. The pulmonary vascularity is unremarkable. Lungs: Low lung volumes are present. No focal consolidation, pleural effusion, or pneumothorax is observed. Bones: The visualized skeletal structures appear intact. No acute intrathoracic process. **This report has been created using voice recognition software. It may contain minor errors which are inherent in voice recognition technology. ** Final report electronically signed by Dr Sahil Peña on 2/7/2022 1:35 PM       Follow-up scheduled after discharge :-    in the next few days with ISABELLA Herring - CNP  in the next few weeks with Nephrology   In 4-6 with Cardiology

## 2022-02-17 NOTE — PLAN OF CARE
Problem: Falls - Risk of:  Goal: Will remain free from falls  Description: Will remain free from falls  0/76/2870 4821 by Paras Casanova RN  Outcome: Completed  2/16/2022 2333 by Patricia Webb RN  Outcome: Met This Shift  Note: Patient remained free from falls this shift. Used call light appropriately. Wore nonskid socks when ambulating with assistance. Bed alarm on. Call light in reach. Goal: Absence of physical injury  Description: Absence of physical injury  3/60/0223 4206 by Paras Casanova RN  Outcome: Completed  2/16/2022 2333 by Patricia Webb RN  Outcome: Met This Shift  Note: Patient remained free from physical injury this shift. Used call light appropriately. Wore nonskid socks when ambulating with assistance. Bed alarm on. Call light in reach. Pathway clear. Problem: Skin Integrity:  Goal: Will show no infection signs and symptoms  Description: Will show no infection signs and symptoms  2/48/3347 0841 by Paras Casanova RN  Outcome: Completed  2/16/2022 2333 by Patricia Webb RN  Outcome: Met This Shift  Note: Afebrile. No s/s of infection noted so far this shift. Goal: Absence of new skin breakdown  Description: Absence of new skin breakdown  5/94/4732 1973 by Paras Casanova RN  Outcome: Completed  2/16/2022 2333 by Patricia Webb RN  Outcome: Met This Shift  Note: Patient remained free from skin breakdown this shift. Patient turned self Q2H and PRN in bed. Pillow support provided. Low-intermittent air loss mattress provided. Will continue to monitor skin integrity and encourage repositioning Q2H and PRN. Problem: Nutrition  Goal: Optimal nutrition therapy  0/66/3860 8425 by Paras Casanova RN  Outcome: Completed  2/16/2022 2333 by Patricia Webb RN  Outcome: Met This Shift  Note: Patient tolerating PO fluids and meals.       Problem: Pain:  Goal: Pain level will decrease  Description: Pain level will decrease  1/54/5174 0573 by Paras Casanova RN  Outcome: Completed  2/16/2022 2333 by Frank Mark RN  Outcome: Ongoing  Note: Patient c/o pain this shift. Pain medication per MAR. Non-pharmacological pain mgmt- rest, reposition, distraction, elevation, emotional support, etc provided. Patient satisfied with pain mgmt. Goal: Control of acute pain  Description: Control of acute pain  Outcome: Completed  Goal: Control of chronic pain  Description: Control of chronic pain  Outcome: Completed     Problem: Discharge Planning:  Goal: Discharged to appropriate level of care  Description: Discharged to appropriate level of care  6/11/4536 3235 by Corwin Ray RN  Outcome: Completed  2/16/2022 2333 by Frank Mark RN  Outcome: Ongoing  Note: Patient from home, plans to return home tomorrow. Plan for PT/OT to evaluate prior to discharge. Will continue to monitor for discharge needs.

## 2022-02-17 NOTE — PROGRESS NOTES
[facility]  INPATIENT PHYSICAL THERAPY  EVALUATION  STRZ MED SURG 8B - 8B-21/021-A    Time In: 9236  Time Out: 0745  Timed Code Treatment Minutes: 8 Minutes  Minutes: 14          Date: 2022  Patient Name: Estela Allen,  Gender:  male        MRN: [id number]  : 1965  (64 y.o.)      Referring Practitioner: ISABELLA Tim CNP  Diagnosis: alcohol abuse  Additional Pertinent Hx: Patient is a 49-year-old male with past medical history of alcohol abuse, and acute cells CHF, anxiety, CAD, A. fib on [address], HLD, essential hypertension, and CKD who presents to [address] [place] [address] ED with chief complaint of chest pain secondary to his AICD firing. History obtained via patient and chart review. Patient is a poor historian. Patient reports that approximately 11:00 this morning he felt his AICD fire. Within the next hour, patient states that his device fired up to 5 times. Patient relates that he has not been taking his medications for the past 5 to 6 days as he has been drinking. Patient reports this is happened in the past, and relates it to his history of drinking. Patient states that he has been on a binge, drinking about 1/5 of apple [place] whiskey for the past 5 to 6 days. Patient reports that on Saturday he fell on ice which led to left knee pain. Patient is on Coumadin, he denies striking his head or any loss of consciousness. Patient denies any other symptoms, including fever/chills, nausea/vomiting/diarrhea. He reports chest pain this morning when he felt his AICD fire, however this has improved. Patient denies shortness of breath. Patient reports left knee pain. Upon exam, patient is agitated and fidgeting. He reports that he has severe anxiety and does not want to be admitted. Discussed with patient that if he did not want admission that he would have to sign out AMA given his condition. Patient will be admitted to stepdown with cardiology consult. Restrictions/Precautions:  Restrictions/Precautions: General Precautions,Fall Risk    Subjective:  Chart Reviewed: Yes  Patient assessed for rehabilitation services?: Yes  Family / Caregiver Present: No  Subjective: RN approved session. Pt pleasant and agreeable to therapy    General:  Overall Orientation Status: Within Functional Limits  Follows Commands: Within Functional Limits    Vision: Within Functional Limits    Hearing: Within functional limits         Pain: some soreness in L knee and R heel; does not rate; does report \"it's fine\"    Vitals: Vitals not assessed per clinical judgement, see nursing flowsheet    Social/Functional History:    Lives With: Alone  Type of Home: Apartment  Home Layout: One level  Home Access: Stairs to enter without rails  Entrance Stairs - Number of Steps: 2  Home Equipment: Rolling walker             ADL Assistance: Independent  Homemaking Assistance: Independent  Ambulation Assistance: Independent  Transfer Assistance: Independent    Active : Yes  Occupation: Full time employment  Type of occupation: [employer]:  Range of Motion:  Bilateral Lower Extremity: WFL    Strength:  Bilateral Lower Extremity: WFL    Balance:  Static Sitting Balance:  Modified Independent  Static Standing Balance: Modified Independent    Bed Mobility:  Supine to Sit: Modified Independent    Transfers:  Sit to Stand: Modified Independent  Stand to Sit:Modified Independent    Ambulation:  Modified Independent  Distance: 250'   Surface: Level Tile  Device:No Device  Gait Deviations:  None    Functional Outcome Measures: Completed  AM-PAC Inpatient Mobility Raw Score : 23  AM-PAC Inpatient T-Scale Score : 56.93    ASSESSMENT:  Activity Tolerance:  Patient tolerance of  treatment: good. Pt with no c/o lightheaded/dizziness, and no concerns with returning home . Discussed duties at work and pt reports he feels he would have no issues with completing them.      Treatment Initiated: Treatment and education initiated within context of evaluation. Evaluation time included review of current medical information, gathering information related to past medical, social and functional history, completion of standardized testing, formal and informal observation of tasks, assessment of data and development of plan of care and goals. Treatment time included skilled education and facilitation of tasks to increase safety and independence with functional mobility for improved independence and quality of life. Assessment:  Assessment: Pt is presenting at/near his PLOF and does not require any further skilled acute PT services. Prognosis: Good    REQUIRES PT FOLLOW UP: No  No Skilled PT: At baseline function    Discharge Recommendations:  Discharge Recommendations: Home independently    Patient Education:  PT Education: PT [facility],General Safety    Equipment Recommendations:  Equipment Needed: No    Plan:  Times per week: NA    Goals:  Patient goals : none stated  Short term goals  Time Frame for Short term goals: NA       Following session, patient left in safe position with all fall risk precautions in place.     Dyana Thacker PT, DPT

## 2022-02-18 ENCOUNTER — CARE COORDINATION (OUTPATIENT)
Dept: CASE MANAGEMENT | Age: 57
End: 2022-02-18

## 2022-02-18 ENCOUNTER — TELEPHONE (OUTPATIENT)
Dept: FAMILY MEDICINE CLINIC | Age: 57
End: 2022-02-18

## 2022-02-18 DIAGNOSIS — Z45.02 AICD DISCHARGE: Primary | ICD-10-CM

## 2022-02-18 NOTE — CARE COORDINATION
[facility] Transitions Initial Follow Up Call    Call within 2 business days of discharge: Yes    Patient: Cleveland Egan Patient : 1965   MRN: [id number]  Reason for Admission: AICD discharge  Discharge Date: 22 RARS: Readmission Risk Score: 19.2 ( )      Last Discharge [facility]       Complaint Diagnosis Description Type Department Provider    22 AICD Problem AICD discharge . .. ED to Hosp-Admission (Discharged) (ADMITTED) URSULA Sanchez PA-C; Jagjit Bowling. .. Spoke with Robin Mcallister, said he is doing ok. Denies chest pain/palpitations, SOB/WEATHERS, fever. No shocks from defibrillator. Reviewed medications with new changes, taking as directed. Has a BP kit, instructed to take BP and HR before taking lopressor and use the parameters, voiced understanding. Said that he goes to [facility] coumadin clinic about once a month but saw note from [facility] that he was not a current patient. I called [facility] and he is a pt, had appt 2/10 but was in hospital and will try to call him today to get INR soon. He was not sent home with any recommendations from [facility]. Robin Mcallister said his BS are fairly stable, taking his insulin and oral meds. Appetite and fluid intake is good. Doesn't monitor weight daily but wt has been stable, denies any edema feet/ankles. He is hoping to go back to work today, usually works days but will work 2nd shift today, waiting on RTW letter from PCP. Is aware of PCP appt on Monday and cardio next month. Denies any needs. No other questions or concerns at this time. Will continue to follow.     Non-face-to-face services provided:  Scheduled appointment with PCP-  Scheduled appointment with Specialist-3/14 4/25  Obtained and reviewed discharge summary and/or continuity of care documents    Care Transitions 24 Hour Call    Schedule Follow Up Appointment with PCP: Completed  Do you have any ongoing symptoms?: No  Do you have a copy of your discharge instructions?: Yes  Do you have all of your prescriptions and are they filled?: Yes  Have you been contacted by a [address]?: No  Have you scheduled your follow up appointment?: Yes  How are you going to get to your appointment?: Car - drive self  Were you discharged with any Home Care or Post Acute Services: No  Post Acute Services: Outpatient/Community Services (Comment: pt has decided to go to OP PT)  Do you feel like you have everything you need to keep you well at home?: Yes  Care Transitions Interventions     Transitions of Care Initial Call      Challenges to be reviewed by the provider   Additional needs identified to be addressed with provider: Yes  LISA Zimmerman Mitziabiolaolga Rao is being followed by [address] [facility] appt 2/10 since inpt, she is calling him today. Method of communication with provider : chart routing      Advance Care Planning:   Does patient have an Advance Directive: reviewed and current. Was this a readmission? No  Patient stated reason for admission: defib shocked him  Patients top risk factors for readmission: lack of knowledge about disease  medical condition-Afib, CHF, CAD, DM CKD  polypharmacy    Care Transition Nurse (CTN) contacted the patient by telephone to perform post hospital discharge assessment. Verified name and  with patient as identifiers. Provided introduction to self, and explanation of the CTN role. CTN reviewed discharge instructions, medical action plan and red flags with patient who verbalized understanding. Patient given an opportunity to ask questions and does not have any further questions or concerns at this time. Were discharge instructions available to patient? Yes. Reviewed appropriate site of care based on symptoms and resources available to patient including: PCP and Specialist. The patient agrees to contact the PCP office for questions related to their healthcare.      Medication reconciliation was performed with patient, who verbalizes understanding of administration of home medications. Advised obtaining a 90-day supply of all daily and as-needed medications. Covid Risk Education     Educated patient about risk for severe COVID-19 due to risk factors according to CDC guidelines. CTN reviewed discharge instructions, medical action plan and red flag symptoms with the patient who verbalized understanding. Discussed COVID vaccination status: Yes. Education provided on COVID-19 vaccination as appropriate. Discussed exposure protocols and quarantine with CDC Guidelines. Patient was given an opportunity to verbalize any questions and concerns and agrees to contact CTN or health care provider for questions related to their healthcare. Reviewed and educated patient on any new and changed medications related to discharge diagnosis. Was patient discharged with a pulse oximeter? No     CTN provided contact information. Plan for follow-up call in 5-7 days based on severity of symptoms and risk factors.   Plan for next call: symptom management-Afib, CHF, DM          Follow Up  Future Appointments   Date Time Provider Tate Hunt   2/21/2022  3:30 PM Riesa Bernheim, APRN - [address] [place], [place] [address]LEROY Carlin   2/24/2022 10:00 AM [facility]   3/14/2022  1:45 PM MD ROSA Enriquez [facility] LEROY Carlin   4/25/2022  3:20 PM MD ROSA Rodrigues [facility] Florencio Santoyo RN

## 2022-02-18 NOTE — TELEPHONE ENCOUNTER
Cannot clear him to return until I see him - was discharged yesterday. Has appointment Monday - can clear to go back Monday if moves appointment up from 330 to before 3 when he goes in.

## 2022-02-18 NOTE — TELEPHONE ENCOUNTER
Pt was notified, he was not happy, but voiced understanding. Says \"it just screws my whole pay up\". Pt declined an earlier appt on Monday, says he works days, so it does not matter.

## 2022-02-18 NOTE — TELEPHONE ENCOUNTER
[facility] Transitions Initial Follow Up Call    Outreach made within 2 business days of discharge: Yes    Patient: Shirley Monteiro Patient : 1965   MRN: [id number]  Reason for Admission: There are no discharge diagnoses documented for the most recent discharge. Discharge Date: 22       Spoke with: Pt    Discharge department/facility: [facility]    TCM Interactive Patient Contact:  Was patient able to fill all prescriptions: Yes  Was patient instructed to bring all medications to the follow-up visit: Yes  Is patient taking all medications as directed in the discharge summary?  Yes  Does patient understand their discharge instructions: Yes  Does patient have questions or concerns that need addressed prior to 7-14 day follow up office visit: no    Scheduled appointment with PCP within 7-14 days    Follow Up  Future Appointments   Date Time Provider Tate Hunt   2022  3:30 PM [address]   2022 10:00 AM [address]   3/14/2022  1:45 PM Suzette Pedersen MD [facility] [place]   2022  3:20 PM MD [facility]A [facility] [address], [address] [place] [address] Ave ([address] [place] [address])

## 2022-02-18 NOTE — TELEPHONE ENCOUNTER
Pt says he is going back to work today, he goes in at [employer]. Pt needs a RTW letter. Please advise.

## 2022-02-18 NOTE — TELEPHONE ENCOUNTER
Pt called office back stating he was started on 4 new medications at the hospital. Is he to take those with the Metoprolol he is already on? Pt aware he will receive a call Monday morning regarding. Please advise.

## 2022-02-21 ENCOUNTER — OFFICE VISIT (OUTPATIENT)
Dept: FAMILY MEDICINE CLINIC | Age: 57
End: 2022-02-21
Payer: COMMERCIAL

## 2022-02-21 VITALS
SYSTOLIC BLOOD PRESSURE: 148 MMHG | RESPIRATION RATE: 16 BRPM | BODY MASS INDEX: 35.03 KG/M2 | WEIGHT: 272.8 LBS | HEART RATE: 80 BPM | DIASTOLIC BLOOD PRESSURE: 78 MMHG

## 2022-02-21 DIAGNOSIS — I25.810 CORONARY ARTERY DISEASE INVOLVING CORONARY BYPASS GRAFT OF NATIVE HEART WITHOUT ANGINA PECTORIS: ICD-10-CM

## 2022-02-21 DIAGNOSIS — N17.9 AKI (ACUTE KIDNEY INJURY) (HCC): ICD-10-CM

## 2022-02-21 DIAGNOSIS — I50.20 HFREF (HEART FAILURE WITH REDUCED EJECTION FRACTION) (HCC): ICD-10-CM

## 2022-02-21 DIAGNOSIS — F19.10 POLYSUBSTANCE ABUSE (HCC): ICD-10-CM

## 2022-02-21 DIAGNOSIS — R07.9 CHEST PAIN, UNSPECIFIED TYPE: ICD-10-CM

## 2022-02-21 DIAGNOSIS — I50.43 ACUTE ON CHRONIC COMBINED SYSTOLIC AND DIASTOLIC CONGESTIVE HEART FAILURE (HCC): ICD-10-CM

## 2022-02-21 DIAGNOSIS — Z45.02 AICD DISCHARGE: ICD-10-CM

## 2022-02-21 DIAGNOSIS — I48.91 ATRIAL FIBRILLATION WITH RVR (HCC): Primary | ICD-10-CM

## 2022-02-21 PROCEDURE — 99496 TRANSJ CARE MGMT HIGH F2F 7D: CPT | Performed by: FAMILY MEDICINE

## 2022-02-21 RX ORDER — MEXILETINE HYDROCHLORIDE 150 MG/1
CAPSULE ORAL
Qty: 540 CAPSULE | Refills: 3 | Status: SHIPPED | OUTPATIENT
Start: 2022-02-21

## 2022-02-21 NOTE — TELEPHONE ENCOUNTER
Patient stated he was in the hospital and wasn't able to request this refill sooner. He also thought he still had some of the medication, but realized he is completely out and does not have enough to take for today. For some reason this med has a note, on 02/08/2022 patient not taking? But according to the patient, he has been taking this med.

## 2022-02-21 NOTE — TELEPHONE ENCOUNTER
Michelle Newby called requesting a refill on the following medications:  Requested Prescriptions     Pending Prescriptions Disp Refills    mexiletine (MEXITIL) 150 MG capsule 540 capsule 3     Sig: TAKE 2 [address] [facility] verified:  .[email or web address]  [id number] - [place], [place] - [address] [phone] [facility] [phone]    Date of last visit: 06/03/2021  Date of next visit (if applicable): 8/94/5448

## 2022-02-21 NOTE — PROGRESS NOTES
Post-Discharge Transitional Care Management Services      Shirley Monteiro   YOB: 1965    Date of Visit:  2/21/2022  30 Day Post-Discharge Date: 3/17/22  Chief Complaint   Patient presents with    Follow-Up from Hospital     D/C'd from [facility] on 2/17/22 - AICD discharged    Letter for School/Work     Admit date  2/7/2022               Discharge date: 2/17/2022 12:34 PM     Chief Complaint on presentation :-  AICD firing / CP      Discharge Assessment and Plan:-   1. A. fib with RVR on [address], resolved: Confirmed on EKG. Patient started on a Cardizem drip in ED, continue. Noted to be on Cordarone, and Mexitil at home, hold these for now. Resume Metoprolol. Appreciate cardiology input. Continue with warfarin, pharmacy to dose. Telemetry monitor. 2/17: Patient to continue amio. New prescription for increase Lopressor given. Okay to continue Bumex and repeat BMP as outpatient. Prescribed low-dose digoxin. Patient to follow-up with cardiology in 3 to 4 weeks  2. Chest pain secondary to AICD firing / Afib with RVR: Ruled out ACS.  Continue to trend troponin. Cardiology consulted.    3. HFrEF/severe CDMP: EF 25 to 30% s/p AICD. 4. Elevated troponin, chronic: Patient has chronic troponin elevation, however this value elevated compared to previous. Janalyn Chant secondary to AICD firing.  Will trend.  EKG shows A. fib with RVR, nonspecific ST and T wave abnormality.  Cardiologist has been consulted. 5. AG metabolic acidosis: repeat BMP and monitor s/p IVFs, improved  6. Alcohol withdrawal:  Patient required Precedex in ICU. Resources have been given. Daily folic acid, thiamine, MTV.   7. Metabolic encephalopathy secondary to #6: resolved  8. Essential hypertension: Patient is on multiple agents, including Cardura, Norvasc, Imdur, Lopressor, hydralazine, Cozaar, and metoprolol. Close monitoring. 9. CAD s/p CABG: Continue Brilinta, beta-blocker, statin, Imdur  10.  CKD stage III: Creatinine noted at 2.1, this is improving previous value 1/22 at 2.7.  Avoid nephrotoxic agents. 2/17: Cr 2.3 on day of discharge. Repeat Bmp and f/u with Nephrology as outpatient. Appointment scheduled. 11. IDDM II, uncontrolled: Continue with home insulin.  SSI.  Accu.  Hypoglycemia protocol in place.  Check hemoglobin A1c, most recent value 8.1 in 11/21. 12. Anxiety: Resume patient's home Xanax  13. Acute left knee pain: Patient underwent imaging in the ED which was negative for acute fracture.  Apply heat.  Tylenol.  PT/OT. 14. S/p mechanical fall: Patient denies any loss of consciousness or striking his head. He slipped on ice. PT/OT. No acute fractures noted on imaging  15. Disposition: Patient was evaluated by PT prior to his discharge and is okay for return home.        Initial H and P and Hospital course:-  Patient is a 63-year-old male with past medical history of alcohol abuse, and acute cells CHF, anxiety, CAD, A. fib [place] [facility], HLD, essential hypertension, and CKD who presents to [facility] ED with chief complaint of chest pain secondary to his AICD firing.  History obtained via patient and chart review. Collin Turner is a poor historian.     Patient reports that approximately 11:00 this morning he felt his AICD fire.  Within the next hour, patient states that his device fired up to 5 times. Collin Turner relates that he has not been taking his medications for the past 5 to 6 days as he has been drinking. Patient reports this is happened in the past, and relates it to his history of drinking. Patient states that he has been on a binge, drinking about 1/5 of [place] whiskey for the past 5 to 6 days.  Patient reports that on Saturday he fell on ice which led to left knee pain.  Patient is on Coumadin, he denies striking his head or any loss of consciousness.  Patient denies any other symptoms, including fever/chills, nausea/vomiting/diarrhea.  He reports chest pain this morning when he felt his AICD fire, however this has improved.  Patient denies shortness of breath.  Patient reports left knee pain.     Upon exam, patient is agitated and fidgeting.  He reports that he has severe anxiety and does not want to be admitted. Discussed with patient that if he did not want admission that he would have to sign out AMA given his condition.  Patient will be admitted to stepdown with cardiology consult.        Patient was admitted 2/7 due to chest pain and reported AICD firing. Patient was also noted to be acutely intoxicated was at severe risk of withdrawal with heavy binge drinking history. Patient at that time was found to be in A. fib with RVR and was admitted with cardiology consult. The day after admission, patient was requiring high levels of phenobarbital without improvement of his withdrawal symptoms and was transferred to the ICU for Precedex. Patient was noted to be acutely encephalopathic. Over the course of his admission, patient's mentation improved and he was taken off the Precedex drip 2/14. Pt complained of not being able to bear weight on his ankle and pain associated with that and therefore PT evaluation was required. All xrays were neg for acute process. Pt was transferred to the medical floor 2/16 due for \"physical therapy\" and clearance however was medically stable for discharge otherwise. PT was unable to see the patient on that day and therefore discharge was withheld pending their evaluation after pt's prolonged stay. On 2/17 PT evaluated patient who recommended home independently. All VSS and patient is stable for follow-up with PCP, Cardiology and nephrology.      Allergies   Allergen Reactions    Latex     Pcn [Penicillins] Hives    Zoloft [Sertraline Hcl] Anxiety     Worsened anxiety     Outpatient Medications Marked as Taking for the 2/21/22 encounter (Office Visit) with Kelechi Ugarte,    Medication Sig Dispense Refill    mexiletine (MEXITIL) 150 MG capsule TAKE 2 CAPSULES THREE TIMES DAILY FOR ATRIAL FIBRILLATION 540 capsule 3    digoxin (LANOXIN) 125 MCG tablet Take 1 tablet by mouth daily 30 tablet 3    Multiple Vitamin (MULTIVITAMIN) TABS tablet Take 1 tablet by mouth daily 30 tablet 0    thiamine 100 MG tablet Take 1 tablet by mouth daily 30 tablet 3    metoprolol (LOPRESSOR) 100 MG tablet Take 1 tablet by mouth daily Hold for SBP less than 100 mmHg, if HR less than 55 bpm 60 tablet 3    ONETOUCH ULTRA strip USE AS DIRECTED TWICE DAILY 150 strip 3    ONE TOUCH ULTRASOFT LANCETS MISC USE AS DIRECTED TWICE DAILY.  200 each 3    doxazosin (CARDURA) 2 MG tablet Take 1 tablet by mouth daily 90 tablet 3    Blood Glucose Monitoring Suppl (ONE TOUCH ULTRA 2) w/Device KIT 1 kit by Does not apply route 2 times daily 1 kit 0    insulin glargine (LANTUS SOLOSTAR) 100 UNIT/ML injection pen Inject 20 Units into the skin nightly Increase 2 units until morning sugar > 150 5 pen 3    Insulin Pen Needle 31G X 8 MM MISC 1 each by Does not apply route daily 100 each 3    linagliptin (TRADJENTA) 5 MG tablet TAKE 1 TABLET BY MOUTH DAILY 90 tablet 3    potassium chloride (KLOR-CON M) 20 MEQ extended release tablet Take 1 tablet by mouth daily 90 tablet 3    ALPRAZolam (XANAX) 0.5 MG tablet TAKE 1 TABLET BY MOUTH AT BEDTIME FOR ANXIETY 30 tablet 5    atorvastatin (LIPITOR) 80 MG tablet TAKE 1 TABLET BY MOUTH EVERY NIGHT 30 tablet 5    amLODIPine (NORVASC) 5 MG tablet Take 1 tablet by mouth 2 times daily 180 tablet 1    amiodarone (CORDARONE) 200 MG tablet Take 1 tablet by mouth daily 90 tablet 3    hydrALAZINE (APRESOLINE) 100 MG tablet TAKE 1 TABLET BY MOUTH THREE TIMES DAILY 270 tablet 3    losartan (COZAAR) 50 MG tablet TAKE 1 TABLET BY MOUTH DAILY 30 tablet 0    ticagrelor (BRILINTA) 90 MG TABS tablet Take 1 tablet by mouth 2 times daily 180 tablet 3    isosorbide mononitrate (IMDUR) 120 MG extended release tablet TAKE 1 TABLET BY MOUTH DAILY 90 tablet 3    bumetanide (BUMEX) 2 MG tablet TAKE 1 TABLET BY MOUTH DAILY 90 tablet 3    warfarin (COUMADIN) 5 MG tablet USE AS DIRECTED BY COUMADIN CLINIC 180 tablet 5    nitroGLYCERIN (NITROSTAT) 0.4 MG SL tablet Place 1 tablet under the tongue every 5 minutes as needed for Chest pain X 3 doses. If chest pain continues seek medical attention 25 tablet 3    glucose blood VI test strips (PHIL CONTOUR TEST) strip 1 each by In Vitro route daily 300 each 3    acetaminophen 650 MG TABS Take 650 mg by mouth every 4 hours as needed 120 tablet 3         Vitals:    02/21/22 1438 02/21/22 1441   BP: (!) 156/84 (!) 148/78   Site: Left Upper Arm Left Upper Arm   Position: Sitting Sitting   Cuff Size: Large Adult Large Adult   Pulse: 80    Resp: 16    Weight: 272 lb 12.8 oz (123.7 kg)      Body mass index is 35.03 kg/m². Wt Readings from Last 3 Encounters:   02/21/22 272 lb 12.8 oz (123.7 kg)   02/11/22 263 lb 0.1 oz (119.3 kg)   01/24/22 280 lb (127 kg)     BP Readings from Last 3 Encounters:   02/21/22 (!) 148/78   02/17/22 128/74   01/24/22 (!) 160/88        Patient was admitted to [address] [place] [address] from 2/7/22 to 2/17/22 for a-fib w RVR. Inpatient course: Discharge summary reviewed- see chart. Current status: improved    Review of Systems:  A comprehensive review of systems was negative except for what was noted in the HPI.     Physical Exam:  General Appearance: alert and oriented to person, place and time, well developed and well- nourished, in no acute distress  Skin: warm and dry, no rash or erythema  Head: normocephalic and atraumatic  Eyes: pupils equal, round, and reactive to light, extraocular eye movements intact, conjunctivae normal  ENT: tympanic membrane, external ear and ear canal normal bilaterally, nose without deformity, nasal mucosa and turbinates normal without polyps  Neck: supple and non-tender without mass, no thyromegaly or thyroid nodules, no cervical lymphadenopathy  Pulmonary/Chest: clear to auscultation bilaterally- no wheezes, rales or rhonchi, normal air movement, no respiratory distress  Cardiovascular: normal rate, regular rhythm, normal S1 and S2, no murmurs, rubs, clicks, or gallops, distal pulses intact, no carotid bruits  Abdomen: soft, non-tender, non-distended, normal bowel sounds, no masses or organomegaly  Extremities: no cyanosis, clubbing or edema  Musculoskeletal: normal range of motion, no joint swelling, deformity or tenderness  Neurologic: reflexes normal and symmetric, no cranial nerve deficit, gait, coordination and speech normal    Initial post-discharge communication occurred between medical assistant and patient on 2/18/22- see documentation in chart: telephone encounter. Assessment/Plan:  Aman Cadena was seen today for follow-up from hospital and letter for school/work.     Diagnoses and all orders for this visit:    Atrial fibrillation with RVR ([address]    Chest pain, unspecified type    AICD discharge    HFrEF (heart failure with reduced ejection fraction) ([facility])    Acute on chronic combined systolic and diastolic congestive heart failure (HCC)    BABAK (acute kidney injury) ([address]    Polysubstance abuse ([address]    Coronary artery disease involving coronary bypass graft of native heart without angina pectoris    -  Chronic medical problems stable  -  Continue current medications  -  Follow up with specialists as scheduled  -  RTO prn    Diagnostic test results reviewed: inpatient labs, EKG, chest x-ray, x-ray-radius/ulna, abd, knee left, ankle right, CT-head and echocardiogram    Patient risk of morbidity and mortality: high    Medical Decision Making: high complexity

## 2022-02-22 NOTE — PROGRESS NOTES
CLINICAL PHARMACY NOTE: MEDS TO BEDS    Total # of Prescriptions Filled: 4   The following medications were delivered to the patient:  Digoxin 125 mcg   Multivitamin   Metoprolol tartrate 100 mg   B1 100 mg tabs    Additional Documentation:    [address], [id number] of [facility] [employer].  Candidate 2022

## 2022-02-24 ENCOUNTER — TELEPHONE (OUTPATIENT)
Dept: FAMILY MEDICINE CLINIC | Age: 57
End: 2022-02-24

## 2022-02-24 RX ORDER — DIGOXIN 125 MCG
125 TABLET ORAL DAILY
Qty: 90 TABLET | Refills: 3 | Status: ON HOLD | OUTPATIENT
Start: 2022-02-24 | End: 2022-04-20

## 2022-02-24 NOTE — TELEPHONE ENCOUNTER
Patient called asking if you received paperwork from [facility]? Employer is [employer] [address] [employer]. Paperwork deadline is 3/4/22. Patient would like a call back.

## 2022-02-24 NOTE — TELEPHONE ENCOUNTER
----- Message from Carlos Westfall sent at 2/24/2022  2:50 PM EST -----  Subject: Message to Provider    QUESTIONS  Information for Provider? needs medical insurance for medical pay---- fax   should be filled out by PA, did office receive paperwork from [facility],   needs status update   ---------------------------------------------------------------------------  --------------  CALL BACK INFO  What is the best way for the office to contact you? OK to leave message on   voicemail  Preferred Call Back Phone Number? [phone]  ---------------------------------------------------------------------------  --------------  SCRIPT ANSWERS  Relationship to Patient?  Self

## 2022-02-24 NOTE — TELEPHONE ENCOUNTER
Blade Espinoza called requesting a refill of the below medication which has been pended for you:     Requested Prescriptions     Pending Prescriptions Disp Refills    digoxin (LANOXIN) 125 MCG tablet 30 tablet 3     Sig: Take 1 tablet by mouth daily       Last Appointment Date: 11/22/2021  Next Appointment Date: 8/22/2022    Allergies   Allergen Reactions    Latex     Pcn [Penicillins] Hives    Zoloft [Sertraline Hcl] Anxiety     Worsened anxiety       Pharmacy change for SR to Antonio riley Patient will check with the pharmacy later today.

## 2022-02-25 ENCOUNTER — CARE COORDINATION (OUTPATIENT)
Dept: CASE MANAGEMENT | Age: 57
End: 2022-02-25

## 2022-02-25 NOTE — CARE COORDINATION
[address] Transitions Follow Up Call-Final    2022    Patient: Joe Hernandez  Patient : 1965   MRN: [id number]  Reason for Admission: AICD discharge  Discharge Date: 22 RARS: Readmission Risk Score: 19.2 ( )         Spoke with Jeramie Kam, said he is doing good. He is at work right now. No shocks from defibrillator,  Denies chest pain/palpitations, SOB/WEATHERS. BP, wt and BS have been stable, taking medications as directed. Denies needs. Does not feel need for further calls. CTN will sign off. Care Transitions Subsequent and Final Call    Subsequent and Final Calls  Do you have any ongoing symptoms?: No  Have your medications changed?: No  Do you have any questions related to your medications?: No  Do you currently have any active services?: No  Do you have any needs or concerns that I can assist you with?: No  Identified Barriers: Lack of Education  Care Transitions Interventions  Other Interventions:       Care Transitions Follow Up Call    Needs to be reviewed by the provider   Additional needs identified to be addressed with provider: No  none             Method of communication with provider : none      Care Transition Nurse (CTN) contacted the patient by telephone to follow up after admission on 22. Verified name and  with patient as identifiers. Addressed changes since last contact: none  Discussed follow-up appointments. Advance Care Planning:   Does patient have an Advance Directive: reviewed and current. CTN reviewed discharge instructions, medical action plan and red flags with patient and discussed any barriers to care and/or understanding of plan of care after discharge. Discussed appropriate site of care based on symptoms and resources available to patient including: PCP and Specialist. The patient agrees to contact the PCP office for questions related to their healthcare.      Patients top risk factors for readmission: lack of knowledge about disease  medical condition-Afib, CHF, CAD, DM, CKD  polypharmacy  Interventions to address risk factors: Scheduled appointment with PCP-8/22 and Scheduled appointment with Specialist-3/14 4/25      Non-[facility] follow up appointment(s): na    CTN provided contact information for future needs. No further follow-up call indicated based on severity of symptoms and risk factors.         Follow Up  Future Appointments   Date Time Provider Tate Hamiltoni   3/14/2022  1:45 PM [address], [facility] [address]   4/25/2022  3:20 PM MD ROSA Betancur [facility], [facility]. [address]   8/22/2022  3:30 PM ISABELLA Silva - BON [place] [id number]

## 2022-03-01 NOTE — TELEPHONE ENCOUNTER
Paperwork received and refaxed to [facility], called and updated the patient.

## 2022-03-08 NOTE — TELEPHONE ENCOUNTER
[facility] paperwork received with note \"Date back 3/4/22\"  Was under impression he went back to work 2/22/22 and this was done in his [employer]

## 2022-03-08 NOTE — TELEPHONE ENCOUNTER
Spoke with patient he dropped off paperwork to be filled out he called off 3/4/22. He did return to work on 2/22/22 with [employer].

## 2022-03-08 NOTE — TELEPHONE ENCOUNTER
Reason for call off 3/4/22 patient states he felt tired from returning to work full duty. Wanting FMLA for 1 year due to his cardiology history and if he doesn't feel good wants the ability to call off and day be covered.

## 2022-03-14 ENCOUNTER — NURSE ONLY (OUTPATIENT)
Dept: CARDIOLOGY CLINIC | Age: 57
End: 2022-03-14
Payer: COMMERCIAL

## 2022-03-14 ENCOUNTER — OFFICE VISIT (OUTPATIENT)
Dept: CARDIOLOGY CLINIC | Age: 57
End: 2022-03-14
Payer: COMMERCIAL

## 2022-03-14 VITALS
HEIGHT: 74 IN | WEIGHT: 270 LBS | DIASTOLIC BLOOD PRESSURE: 70 MMHG | HEART RATE: 82 BPM | BODY MASS INDEX: 34.65 KG/M2 | SYSTOLIC BLOOD PRESSURE: 132 MMHG

## 2022-03-14 DIAGNOSIS — I10 PRIMARY HYPERTENSION: ICD-10-CM

## 2022-03-14 DIAGNOSIS — I25.5 ISCHEMIC CARDIOMYOPATHY: ICD-10-CM

## 2022-03-14 DIAGNOSIS — Z95.810 S/P ICD (INTERNAL CARDIAC DEFIBRILLATOR) PROCEDURE: Primary | ICD-10-CM

## 2022-03-14 DIAGNOSIS — I25.10 CORONARY ARTERY DISEASE INVOLVING NATIVE CORONARY ARTERY OF NATIVE HEART WITHOUT ANGINA PECTORIS: Primary | ICD-10-CM

## 2022-03-14 PROCEDURE — 99214 OFFICE O/P EST MOD 30 MIN: CPT | Performed by: NUCLEAR MEDICINE

## 2022-03-14 PROCEDURE — 93283 PRGRMG EVAL IMPLANTABLE DFB: CPT | Performed by: NUCLEAR MEDICINE

## 2022-03-14 RX ORDER — METOPROLOL SUCCINATE 100 MG/1
100 TABLET, EXTENDED RELEASE ORAL DAILY
Qty: 90 TABLET | Refills: 3 | Status: SHIPPED | OUTPATIENT
Start: 2022-03-14

## 2022-03-14 RX ORDER — METOPROLOL SUCCINATE 100 MG/1
100 TABLET, EXTENDED RELEASE ORAL DAILY
COMMUNITY
End: 2022-03-14 | Stop reason: SDUPTHER

## 2022-03-14 NOTE — PROGRESS NOTES
[address]  [address]  [facility] [address]  Dept: [phone]  Dept Fax: [phone]  Loc: [phone]    Visit Date: 3/14/2022    Haleigh Watson is a 64 y.o. 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    Anxiety disorder     Arthritis     Atrial fibrillation ([address]     CAD (coronary artery disease) 3/25/2014    s/p CABG in may 2014    Cardiomyopathy ([address]     Chronic kidney disease     Depression     Diabetes mellitus ([address]     Drug abuse ([address]     hx of cacaine abuse, stopped abusing drugs in 2012    GERD (gastroesophageal reflux disease)     H/O cardiac catheterization 4/30/2014    Hyperlipidemia     Hypertension     Intradural extramedullary spinal tumor     Lumbar spine tumor     Medtronic dual icd      Neuromuscular disorder ([address]     Other disorders of kidney and ureter in diseases classified elsewhere     Paroxysmal atrial fibrillation ([address] 7/22/2014    V tach ([address]     V-tach [facility])     s/p ablation in dec 2014      Past Surgical History:   Procedure Laterality Date    CARDIAC CATHETERIZATION  3/20/14     [facility]    CARDIAC DEFIBRILLATOR PLACEMENT  10/13/2015    [facility], MRI CONDITIONAL ICD    CORONARY ARTERY BYPASS GRAFT  5-9-14    3 bypass    EKG 12-LEAD  7/17/2015         OTHER SURGICAL HISTORY Left 10/21/14    Left Bursectomy, I & D Left Elbow - Dr. Jessica Devine  PACEMAKER PLACEMENT      [place] LAMINECTOMY,>2 SGMT,LUMBAR N/A 2018    LUMBAR AND SACRAL LAMINECTOMY, REMOVAL OF INTRASPINAL TUMORS performed by Jasen Swanson MD at [address] harvest from legs     Family History   Problem Relation Age of Onset    Diabetes Mother     High Blood Pressure Mother     Stroke Mother     Diabetes Father     Heart Disease Father     High Blood Pressure Father     Heart Disease Sister         CABG    Diabetes Maternal Grandmother     Diabetes Maternal Grandfather     Heart Disease Paternal Grandfather      Social History     Tobacco Use    Smoking status: Former Smoker     Packs/day: 1.00     Years: 32.00     Pack years: 32.00     Types: Cigarettes     Start date: 1980     Quit date: 10/19/2020     Years since quittin.4    Smokeless tobacco: Former User   Substance Use Topics    Alcohol use: Yes     Alcohol/week: 0.0 standard drinks     Comment: occasional      Current Outpatient Medications   Medication Sig Dispense Refill    digoxin (LANOXIN) 125 MCG tablet Take 1 tablet by mouth daily 90 tablet 3    mexiletine (MEXITIL) 150 MG capsule TAKE 2 CAPSULES THREE TIMES DAILY FOR ATRIAL FIBRILLATION 540 capsule 3    Multiple Vitamin (MULTIVITAMIN) TABS tablet Take 1 tablet by mouth daily 30 tablet 0    thiamine 100 MG tablet Take 1 tablet by mouth daily 30 tablet 3    metoprolol (LOPRESSOR) 100 MG tablet Take 1 tablet by mouth daily Hold for SBP less than 100 mmHg, if HR less than 55 bpm 60 tablet 3    ONETOUCH ULTRA strip USE AS DIRECTED TWICE DAILY 150 strip 3    ONE TOUCH ULTRASOFT LANCETS MISC USE AS DIRECTED TWICE DAILY.  200 each 3    doxazosin (CARDURA) 2 MG tablet Take 1 tablet by mouth daily 90 tablet 3    Blood Glucose Monitoring Suppl (ONE TOUCH ULTRA 2) w/Device KIT 1 kit by Does not apply route 2 times daily 1 kit 0    insulin glargine (LANTUS SOLOSTAR) 100 UNIT/ML injection pen Inject 20 Units into the skin nightly Increase 2 units until morning sugar > 150 5 pen 3    Insulin Pen Needle 31G X 8 MM MISC 1 each by Does not apply route daily 100 each 3    linagliptin (TRADJENTA) 5 MG tablet TAKE 1 TABLET BY MOUTH DAILY 90 tablet 3    potassium chloride (KLOR-CON M) 20 MEQ extended release tablet Take 1 tablet by mouth daily 90 tablet 3    ALPRAZolam (XANAX) 0.5 MG tablet TAKE 1 TABLET BY MOUTH AT BEDTIME FOR ANXIETY 30 tablet 5    atorvastatin (LIPITOR) 80 MG tablet TAKE 1 TABLET BY MOUTH EVERY NIGHT 30 tablet 5    amLODIPine (NORVASC) 5 MG tablet Take 1 tablet by mouth 2 times daily 180 tablet 1    amiodarone (CORDARONE) 200 MG tablet Take 1 tablet by mouth daily 90 tablet 3    hydrALAZINE (APRESOLINE) 100 MG tablet TAKE 1 TABLET BY MOUTH THREE TIMES DAILY 270 tablet 3    losartan (COZAAR) 50 MG tablet TAKE 1 TABLET BY MOUTH DAILY 30 tablet 0    ticagrelor (BRILINTA) 90 MG TABS tablet Take 1 tablet by mouth 2 times daily 180 tablet 3    isosorbide mononitrate (IMDUR) 120 MG extended release tablet TAKE 1 TABLET BY MOUTH DAILY 90 tablet 3    bumetanide (BUMEX) 2 MG tablet TAKE 1 TABLET BY MOUTH DAILY 90 tablet 3    warfarin (COUMADIN) 5 MG tablet USE AS DIRECTED BY COUMADIN CLINIC 180 tablet 5    nitroGLYCERIN (NITROSTAT) 0.4 MG SL tablet Place 1 tablet under the tongue every 5 minutes as needed for Chest pain X 3 doses. If chest pain continues seek medical attention 25 tablet 3    glucose blood VI test strips (PHIL CONTOUR TEST) strip 1 each by In Vitro route daily 300 each 3    acetaminophen 650 MG TABS Take 650 mg by mouth every 4 hours as needed 120 tablet 3     No current facility-administered medications for this visit.      Allergies   Allergen Reactions    Latex     Pcn [Penicillins] Hives    Zoloft [Sertraline Hcl] Anxiety     Worsened anxiety     Health Maintenance   Topic Date Due    Pneumococcal 0-64 years Vaccine (1 of 4 - PCV13) Never done    COVID-19 Vaccine (1) Never done    Hepatitis B vaccine (1 of 3 - Risk 3-dose series) Never done    Colorectal Cancer Screen  Never done    Diabetic retinal exam  12/15/2016    Low dose CT lung screening  02/03/2020    Flu vaccine (1) Never done    Diabetic foot exam  10/06/2022    Lipid screen  11/12/2022    Depression Monitoring  11/22/2022    A1C test (Diabetic or Prediabetic)  02/07/2023    TSH testing  02/07/2023    Potassium monitoring  02/17/2023    Creatinine monitoring  02/17/2023    DTaP/Tdap/Td vaccine (2 - Td or Tdap) 06/25/2027    Hepatitis C screen  Completed    HIV screen  Completed    Hepatitis A vaccine  Aged Out    Hib vaccine  Aged Out    Meningococcal (ACWY) vaccine  Aged Out       Subjective:  Review of Systems  General:   No fever, no chills, No fatigue or weight loss  Pulmonary:    some dyspnea, no wheezing  Cardiac:    Denies recent chest pain,   GI:     No nausea or vomiting, no abdominal pain  Neuro:    No dizziness or light headedness,   Musculoskeletal:  No recent active issues  Extremities:   No edema, no obvious claudication       Objective:  Physical Exam  /70   Pulse 82   Ht 6' 2\" (1.88 m)   Wt 270 lb (122.5 kg)   BMI 34.67 kg/m²   General:   Well developed, well nourished  Lungs:   Clear to auscultation  Heart:    Normal S1 S2, Slight murmur. no rubs, no gallops  Abdomen:   Soft, non tender, no organomegalies, positive bowel sounds  Extremities:   No edema, no cyanosis, good peripheral pulses  Neurological:   Awake, alert, oriented. No obvious focal deficits  Musculoskelatal:  No obvious deformities    Assessment:      Diagnosis Orders   1. Coronary artery disease involving native coronary artery of native heart without angina pectoris     2. Primary hypertension     3. Ischemic cardiomyopathy     as above  Cardiac as above   Some medication confusion   Some changes from when he was in the hospital     Plan:  No follow-ups on file.   Discussed  Change to toprol 100 mg daily   Keep the rest of the meds  Continue risk factor modification and medical management  Thank you for allowing me to participate in the care of your patient. Please don't hesitate to contact me regarding any further issues related to the patient care    Orders Placed:  No orders of the defined types were placed in this encounter. Medications Prescribed:  No orders of the defined types were placed in this encounter. Discussed use, benefit, and side effects of prescribed medications. All patient questions answered. Pt voicedunderstanding. Instructed to continue current medications, diet and exercise. Continue risk factor modification and medical management. Patient agreed with treatment plan. Follow up as directed.     Electronically signedby Soraida Bardales MD on 3/14/2022 at 2:05 PM

## 2022-03-14 NOTE — PROGRESS NOTES
DR Aidan Ross PT / SEEN BY DR Aidan Ross TODAY     BATTERY 17 MONTHS REMAINING  KNOWN AFIB / COUMADIN  AFIB BURDEN 100%    PRESENTS IN AFIB VS 77    P WAVES 0.6  RV WAVES 8    ATRIAL  IMPEDENCE 399  VENT IMPEDENCE 342  RV 56  VENT THRESHOLD 0.75 @ 0.4    A PACED <0.1%  V PACED <0.1%      OPTIVOL WAS ELEVATED BUT PT DENIES ANY WEIGHT GAIN, SOB OR ANY EDEMA

## 2022-04-06 DIAGNOSIS — I25.810 CORONARY ARTERY DISEASE INVOLVING CORONARY BYPASS GRAFT OF NATIVE HEART WITHOUT ANGINA PECTORIS: ICD-10-CM

## 2022-04-06 RX ORDER — ISOSORBIDE MONONITRATE 120 MG/1
120 TABLET, EXTENDED RELEASE ORAL DAILY
Qty: 90 TABLET | Refills: 3 | Status: SHIPPED | OUTPATIENT
Start: 2022-04-06

## 2022-04-06 NOTE — TELEPHONE ENCOUNTER
The patient called in requesting a refill on his Isosorbide 120mg to go to [address] [place] [address]. Order pended for your signature.       The patient will check with his pharmacy after 4pm.

## 2022-04-12 ENCOUNTER — PROCEDURE VISIT (OUTPATIENT)
Dept: CARDIOLOGY CLINIC | Age: 57
End: 2022-04-12
Payer: COMMERCIAL

## 2022-04-12 ENCOUNTER — TELEPHONE (OUTPATIENT)
Dept: CARDIOLOGY CLINIC | Age: 57
End: 2022-04-12

## 2022-04-12 DIAGNOSIS — I50.20 SYSTOLIC CONGESTIVE HEART FAILURE, NYHA CLASS 2, UNSPECIFIED CONGESTIVE HEART FAILURE CHRONICITY (HCC): Primary | ICD-10-CM

## 2022-04-12 PROCEDURE — 93297 REM INTERROG DEV EVAL ICPMS: CPT | Performed by: NUCLEAR MEDICINE

## 2022-04-12 PROCEDURE — G2066 INTER DEVC REMOTE 30D: HCPCS | Performed by: NUCLEAR MEDICINE

## 2022-04-12 RX ORDER — WARFARIN SODIUM 5 MG/1
TABLET ORAL
Qty: 180 TABLET | Refills: 5 | Status: SHIPPED | OUTPATIENT
Start: 2022-04-12

## 2022-04-12 NOTE — TELEPHONE ENCOUNTER
Okay. He was drinking and not taking his meds for a while at that time I guess  Have him come in for a follow up with me or the mid levels to discuss possible CV

## 2022-04-13 ENCOUNTER — OFFICE VISIT (OUTPATIENT)
Dept: CARDIOLOGY CLINIC | Age: 57
End: 2022-04-13
Payer: COMMERCIAL

## 2022-04-13 VITALS
WEIGHT: 280.2 LBS | HEART RATE: 104 BPM | DIASTOLIC BLOOD PRESSURE: 80 MMHG | HEIGHT: 74 IN | BODY MASS INDEX: 35.96 KG/M2 | SYSTOLIC BLOOD PRESSURE: 147 MMHG

## 2022-04-13 DIAGNOSIS — I48.21 PERMANENT ATRIAL FIBRILLATION (HCC): ICD-10-CM

## 2022-04-13 DIAGNOSIS — I10 PRIMARY HYPERTENSION: ICD-10-CM

## 2022-04-13 DIAGNOSIS — I42.0 DILATED CARDIOMYOPATHY (HCC): Primary | ICD-10-CM

## 2022-04-13 PROCEDURE — 99214 OFFICE O/P EST MOD 30 MIN: CPT | Performed by: NUCLEAR MEDICINE

## 2022-04-13 NOTE — PROGRESS NOTES
[address]  [facility] [address]  [address]  [facility] [address]  Dept: [phone]  Dept Fax: [phone]  Loc: [phone]    Visit Date: 4/13/2022    Mike Rushing is a 64 y.o. male who presents todayfor:  Chief Complaint   Patient presents with    Atrial Fibrillation    Hypertension    Cardiomyopathy     Back in A fib   He stopped taking his meds for a while   Was also drinking   Ended up in A fib again  He is in A fib 100%   Now in A fib   Known CMP   Does have more dyspnea   More than usual   Known complicated multiple issues  Previous CABG  BP is stable   No dizziness  No syncope      HPI:  HPI  Past Medical History:   Diagnosis Date    Acute systolic CHF (congestive heart failure) ([address] 7/16/2015    Alcohol abuse     stopped drinking since 2012    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    Anxiety disorder     Arthritis     Atrial fibrillation ([address]     CAD (coronary artery disease) 3/25/2014    s/p CABG in may 2014    Cardiomyopathy ([address]     Chronic kidney disease     Depression     Diabetes mellitus ([address]     Drug abuse ([address]     hx of cacaine abuse, stopped abusing drugs in 2012    GERD (gastroesophageal reflux disease)     H/O cardiac catheterization 4/30/2014    Hyperlipidemia     Hypertension     Intradural extramedullary spinal tumor     Lumbar spine tumor     Medtronic dual icd      Neuromuscular disorder ([address]     Other disorders of kidney and ureter in diseases classified elsewhere     Paroxysmal atrial fibrillation ([address] 7/22/2014    V tach ([address]     V-tach [facility])     s/p ablation in dec 2014      Past Surgical History:   Procedure Laterality Date    C[facility]IAC [facility]HE[facility]IZATION  3/20/14     [facility]    CARDIAC DEFIBRILLATOR PLACEMENT  10/13/2015    [facility], MRI CONDITIONAL ICD    CORONARY ARTERY BYPASS GRAFT  5-9-14    3 bypass    EKG 12-LEAD  7/17/2015         OTHER SURGICAL HISTORY Left 10/21/14 Left Bursectomy, I & D Left Elbow - Dr. Ladi Nesbitt LAMINECTOMY,>2 [facility] N/A 2018    LUMBAR AND SACRAL LAMINECTOMY, REMOVAL OF INTRASPINAL TUMORS performed by Daya Andres MD at [id number] harvest from legs     Family History   Problem Relation Age of Onset    Diabetes Mother     High Blood Pressure Mother     Stroke Mother     Diabetes Father     Heart Disease Father     High Blood Pressure Father     Heart Disease Sister         CABG    Diabetes Maternal Grandmother     Diabetes Maternal Grandfather     Heart Disease Paternal Grandfather      Social History     Tobacco Use    Smoking status: Former Smoker     Packs/day: 1.00     Years: 32.00     Pack years: 32.00     Types: Cigarettes     Start date: 1980     Quit date: 10/19/2020     Years since quittin.4    Smokeless tobacco: Former User   Substance Use Topics    Alcohol use: Yes     Alcohol/week: 0.0 standard drinks     Comment: occasional      Current Outpatient Medications   Medication Sig Dispense Refill    warfarin (COUMADIN) 5 MG tablet USE AS DIRECTED BY COUMADIN CLINIC 180 tablet 5    isosorbide mononitrate (IMDUR) 120 MG extended release tablet Take 1 tablet by mouth daily 90 tablet 3    metoprolol succinate (TOPROL XL) 100 MG extended release tablet Take 1 tablet by mouth daily 90 tablet 3    mexiletine (MEXITIL) 150 MG capsule TAKE 2 CAPSULES THREE TIMES DAILY FOR ATRIAL FIBRILLATION 540 capsule 3    ONETOUCH ULTRA strip USE AS DIRECTED TWICE DAILY 150 strip 3    ONE TOUCH ULTRASOFT LANCETS MISC USE AS DIRECTED TWICE DAILY.  200 each 3    doxazosin (CARDURA) 2 MG tablet Take 1 tablet by mouth daily 90 tablet 3    Blood Glucose Monitoring Suppl (ONE TOUCH ULTRA 2) w/Device KIT 1 kit by Does not apply route 2 times daily 1 kit 0    insulin glargine (LANTUS SOLOSTAR) 100 UNIT/ML injection pen Inject 20 Units into the skin nightly Increase 2 units until morning sugar > 150 5 pen 3    Insulin Pen Needle 31G X 8 MM MISC 1 each by Does not apply route daily 100 each 3    linagliptin (TRADJENTA) 5 MG tablet TAKE 1 TABLET BY MOUTH DAILY 90 tablet 3    potassium chloride (KLOR-CON M) 20 MEQ extended release tablet Take 1 tablet by mouth daily 90 tablet 3    atorvastatin (LIPITOR) 80 MG tablet TAKE 1 TABLET BY MOUTH EVERY NIGHT 30 tablet 5    amiodarone (CORDARONE) 200 MG tablet Take 1 tablet by mouth daily 90 tablet 3    hydrALAZINE (APRESOLINE) 100 MG tablet TAKE 1 TABLET BY MOUTH THREE TIMES DAILY 270 tablet 3    losartan (COZAAR) 50 MG tablet TAKE 1 TABLET BY MOUTH DAILY 30 tablet 0    ticagrelor (BRILINTA) 90 MG TABS tablet Take 1 tablet by mouth 2 times daily 180 tablet 3    bumetanide (BUMEX) 2 MG tablet TAKE 1 TABLET BY MOUTH DAILY 90 tablet 3    glucose blood VI test strips (PHIL CONTOUR TEST) strip 1 each by In Vitro route daily 300 each 3    acetaminophen 650 MG TABS Take 650 mg by mouth every 4 hours as needed 120 tablet 3    digoxin (LANOXIN) 125 MCG tablet Take 1 tablet by mouth daily (Patient not taking: Reported on 4/13/2022) 90 tablet 3    thiamine 100 MG tablet Take 1 tablet by mouth daily (Patient not taking: Reported on 4/13/2022) 30 tablet 3    amLODIPine (NORVASC) 5 MG tablet Take 1 tablet by mouth 2 times daily (Patient not taking: Reported on 4/13/2022) 180 tablet 1    nitroGLYCERIN (NITROSTAT) 0.4 MG SL tablet Place 1 tablet under the tongue every 5 minutes as needed for Chest pain X 3 doses. If chest pain continues seek medical attention (Patient not taking: Reported on 4/13/2022) 25 tablet 3     No current facility-administered medications for this visit.      Allergies   Allergen Reactions    Latex     Pcn [Penicillins] Hives    Zoloft [Sertraline Hcl] Anxiety     Worsened anxiety     Health Maintenance   Topic Date Due    Pneumococcal 0-64 years Vaccine (1 - PCV) Never done    COVID-19 Vaccine (1) Never done   Payal Enrique management  Thank you for allowing me to participate in the care of your patient. Please don't hesitate to contact me regarding any further issues related to the patient care    Orders Placed:  No orders of the defined types were placed in this encounter. Medications Prescribed:  No orders of the defined types were placed in this encounter. Discussed use, benefit, and side effects of prescribed medications. All patient questions answered. Pt voicedunderstanding. Instructed to continue current medications, diet and exercise. Continue risk factor modification and medical management. Patient agreed with treatment plan. Follow up as directed.     Electronically signedby Josesito Nice MD on 4/13/2022 at 12:43 PM

## 2022-04-15 NOTE — PRE-PROCEDURE INSTRUCTIONS
[place] on 2nd floor [facility]  NPO after midnight  Bring drivers license and insurance information  Wear comfortable clean clothes  Shower morning of and night before with liquid antibacterial soap  Remove jewelry   Bring medications in original bottles - may take am meds with small amount of water. Do not take any diabetic meds day of procedure. Made aware of visitors limit to 2 at a time  Follow all instructions given by your physician  Please notify doctor office if you need to cancel or reschedule your procedure   needed at discharge  Bring and wear your mask.   If you use CPap or BiPap for sleep apnea, please bring machine with you  Leave valuables at home

## 2022-04-19 ENCOUNTER — PREP FOR PROCEDURE (OUTPATIENT)
Dept: CARDIOLOGY | Age: 57
End: 2022-04-19

## 2022-04-19 RX ORDER — SODIUM CHLORIDE 0.9 % (FLUSH) 0.9 %
5-40 SYRINGE (ML) INJECTION PRN
Status: CANCELLED | OUTPATIENT
Start: 2022-04-19

## 2022-04-19 RX ORDER — SODIUM CHLORIDE 0.9 % (FLUSH) 0.9 %
5-40 SYRINGE (ML) INJECTION EVERY 12 HOURS SCHEDULED
Status: CANCELLED | OUTPATIENT
Start: 2022-04-19

## 2022-04-19 RX ORDER — SODIUM CHLORIDE 9 MG/ML
INJECTION, SOLUTION INTRAVENOUS CONTINUOUS
Status: CANCELLED | OUTPATIENT
Start: 2022-04-19

## 2022-04-19 RX ORDER — SODIUM CHLORIDE 9 MG/ML
25 INJECTION, SOLUTION INTRAVENOUS PRN
Status: CANCELLED | OUTPATIENT
Start: 2022-04-19

## 2022-04-20 ENCOUNTER — HOSPITAL ENCOUNTER (OUTPATIENT)
Dept: INPATIENT UNIT | Age: 57
Discharge: HOME | End: 2022-04-20
Attending: NUCLEAR MEDICINE | Admitting: NUCLEAR MEDICINE
Payer: COMMERCIAL

## 2022-04-20 VITALS
SYSTOLIC BLOOD PRESSURE: 129 MMHG | HEART RATE: 65 BPM | HEIGHT: 74 IN | RESPIRATION RATE: 20 BRPM | TEMPERATURE: 98.3 F | WEIGHT: 280 LBS | OXYGEN SATURATION: 96 % | DIASTOLIC BLOOD PRESSURE: 63 MMHG | BODY MASS INDEX: 35.94 KG/M2

## 2022-04-20 DIAGNOSIS — I48.21 PERMANENT ATRIAL FIBRILLATION (HCC): ICD-10-CM

## 2022-04-20 DIAGNOSIS — I10 PRIMARY HYPERTENSION: ICD-10-CM

## 2022-04-20 DIAGNOSIS — I42.0 DILATED CARDIOMYOPATHY (HCC): ICD-10-CM

## 2022-04-20 LAB
ABO: NORMAL
ANION GAP SERPL CALCULATED.3IONS-SCNC: 11 MEQ/L (ref 8–16)
ANTIBODY SCREEN: NORMAL
APTT: 44 SECONDS (ref 22–38)
BUN BLDV-MCNC: 32 MG/DL (ref 7–22)
CALCIUM SERPL-MCNC: 8.4 MG/DL (ref 8.5–10.5)
CHLORIDE BLD-SCNC: 106 MEQ/L (ref 98–111)
CHOLESTEROL, TOTAL: 115 MG/DL (ref 100–199)
CO2: 21 MEQ/L (ref 23–33)
CREAT SERPL-MCNC: 2.4 MG/DL (ref 0.4–1.2)
EKG ATRIAL RATE: 54 BPM
EKG P AXIS: -11 DEGREES
EKG P-R INTERVAL: 190 MS
EKG Q-T INTERVAL: 352 MS
EKG Q-T INTERVAL: 442 MS
EKG QRS DURATION: 84 MS
EKG QRS DURATION: 92 MS
EKG QTC CALCULATION (BAZETT): 419 MS
EKG QTC CALCULATION (BAZETT): 462 MS
EKG R AXIS: 15 DEGREES
EKG R AXIS: 6 DEGREES
EKG T AXIS: 142 DEGREES
EKG T AXIS: 80 DEGREES
EKG VENTRICULAR RATE: 104 BPM
EKG VENTRICULAR RATE: 54 BPM
ERYTHROCYTE [DISTWIDTH] IN BLOOD BY AUTOMATED COUNT: 14.5 % (ref 11.5–14.5)
ERYTHROCYTE [DISTWIDTH] IN BLOOD BY AUTOMATED COUNT: 54.3 FL (ref 35–45)
GFR SERPL CREATININE-BSD FRML MDRD: 28 ML/MIN/1.73M2
GLUCOSE BLD-MCNC: 86 MG/DL (ref 70–108)
HCT VFR BLD CALC: 37.8 % (ref 42–52)
HDLC SERPL-MCNC: 39 MG/DL
HEMOGLOBIN: 12.1 GM/DL (ref 14–18)
INR BLD: 2.18 (ref 0.85–1.13)
LDL CHOLESTEROL CALCULATED: 61 MG/DL
LV EF: 25 %
LVEF MODALITY: NORMAL
MCH RBC QN AUTO: 32.7 PG (ref 26–33)
MCHC RBC AUTO-ENTMCNC: 32 GM/DL (ref 32.2–35.5)
MCV RBC AUTO: 102.2 FL (ref 80–94)
PLATELET # BLD: 137 THOU/MM3 (ref 130–400)
PMV BLD AUTO: 10.1 FL (ref 9.4–12.4)
POTASSIUM SERPL-SCNC: 4.1 MEQ/L (ref 3.5–5.2)
RBC # BLD: 3.7 MILL/MM3 (ref 4.7–6.1)
RH FACTOR: NORMAL
SODIUM BLD-SCNC: 138 MEQ/L (ref 135–145)
TRIGL SERPL-MCNC: 73 MG/DL (ref 0–199)
WBC # BLD: 7.8 THOU/MM3 (ref 4.8–10.8)

## 2022-04-20 PROCEDURE — 92960 CARDIOVERSION ELECTRIC EXT: CPT

## 2022-04-20 PROCEDURE — 2500000003 HC RX 250 WO HCPCS

## 2022-04-20 PROCEDURE — 92960 CARDIOVERSION ELECTRIC EXT: CPT | Performed by: NUCLEAR MEDICINE

## 2022-04-20 PROCEDURE — 85027 COMPLETE CBC AUTOMATED: CPT

## 2022-04-20 PROCEDURE — 93005 ELECTROCARDIOGRAM TRACING: CPT | Performed by: NUCLEAR MEDICINE

## 2022-04-20 PROCEDURE — 6360000002 HC RX W HCPCS

## 2022-04-20 PROCEDURE — 85610 PROTHROMBIN TIME: CPT

## 2022-04-20 PROCEDURE — 93005 ELECTROCARDIOGRAM TRACING: CPT | Performed by: PHYSICIAN ASSISTANT

## 2022-04-20 PROCEDURE — 80061 LIPID PANEL: CPT

## 2022-04-20 PROCEDURE — 93325 DOPPLER ECHO COLOR FLOW MAPG: CPT

## 2022-04-20 PROCEDURE — 80048 BASIC METABOLIC PNL TOTAL CA: CPT

## 2022-04-20 PROCEDURE — 93320 DOPPLER ECHO COMPLETE: CPT

## 2022-04-20 PROCEDURE — 2580000003 HC RX 258: Performed by: PHYSICIAN ASSISTANT

## 2022-04-20 PROCEDURE — 86901 BLOOD TYPING SEROLOGIC RH(D): CPT

## 2022-04-20 PROCEDURE — 86850 RBC ANTIBODY SCREEN: CPT

## 2022-04-20 PROCEDURE — 86900 BLOOD TYPING SEROLOGIC ABO: CPT

## 2022-04-20 PROCEDURE — 6370000000 HC RX 637 (ALT 250 FOR IP)

## 2022-04-20 PROCEDURE — 85730 THROMBOPLASTIN TIME PARTIAL: CPT

## 2022-04-20 PROCEDURE — 36415 COLL VENOUS BLD VENIPUNCTURE: CPT

## 2022-04-20 PROCEDURE — 93312 ECHO TRANSESOPHAGEAL: CPT

## 2022-04-20 RX ORDER — SODIUM CHLORIDE 0.9 % (FLUSH) 0.9 %
5-40 SYRINGE (ML) INJECTION PRN
Status: DISCONTINUED | OUTPATIENT
Start: 2022-04-20 | End: 2022-04-20 | Stop reason: HOSPADM

## 2022-04-20 RX ORDER — SODIUM CHLORIDE 0.9 % (FLUSH) 0.9 %
5-40 SYRINGE (ML) INJECTION EVERY 12 HOURS SCHEDULED
Status: DISCONTINUED | OUTPATIENT
Start: 2022-04-20 | End: 2022-04-20 | Stop reason: HOSPADM

## 2022-04-20 RX ORDER — SODIUM CHLORIDE 9 MG/ML
INJECTION, SOLUTION INTRAVENOUS CONTINUOUS
Status: DISCONTINUED | OUTPATIENT
Start: 2022-04-20 | End: 2022-04-20 | Stop reason: HOSPADM

## 2022-04-20 RX ORDER — SODIUM CHLORIDE 9 MG/ML
25 INJECTION, SOLUTION INTRAVENOUS PRN
Status: DISCONTINUED | OUTPATIENT
Start: 2022-04-20 | End: 2022-04-20 | Stop reason: HOSPADM

## 2022-04-20 RX ADMIN — SODIUM CHLORIDE: 9 INJECTION, SOLUTION INTRAVENOUS at 14:25

## 2022-04-20 NOTE — PROCEDURES
[address] [place] [address]                            CARDIAC CATHETERIZATION    PATIENT NAME: ALESSIO SARABIA                      :        1965  MED REC NO:   [id number]                           ROOM:       0005  ACCOUNT NO:   [de-identified]                           ADMIT DATE: 2022  PROVIDER:     JUVENTINO Lay OF PROCEDURE:  2022    CARDIOVERSION    CLINICAL HISTORY AND INDICATION:  This is a gentleman with recent  COVID-19 infection who went in atrial fibrillation, has been in  persistent atrial fibrillation since then. The patient has had previous  history of atrial fibrillation, referred for MYRIAM-guided cardioversion. PROCEDURES:  1. MYRIAM-guided cardioversion. 2.  Sedation:  6 of Versed and 100 of fentanyl between 04:00 and 04:30  p.m. in my presence under my supervision. 3.  Reversal of sedation with 0.2 mg of Romazicon and 0.4 of Narcan. PROCEDURE DETAILS:  The patient was brought to cath lab. Hands-free  pads were applied to right upper chest and left upper back. The patient  received transesophageal echocardiogram to rule out intracardiac  thrombi. Dictated in a separate report. A shock was given from a  biphasic defibrillator at 200 joules, converting him back to sinus  rhythm without complication. Pacemaker was checked before and after. The patient was awake, alert and oriented. CONCLUSION:  1. Successful MYRIAM-guided cardioversion. 2.  No complication.         Maurice George M.D.    D: 2022 17:07:47       T: 2022 17:54:53     [email or web address]  Job#: [id number]     Doc#: [id number]    CC:

## 2022-04-20 NOTE — H&P
[address] [place] [id number]  Sedation/Analgesia History & Physical    Pt Name: Wilene Fothergill  Account number: [de-identified]  MRN: [id number]  YOB: 1965  Provider Performing Procedure: Ana María Hartley MD MD [facility] [place]  Primary Care Physician: ISABELLA Chandler - [place]  Date: 4/20/2022    PRE-PROCEDURE    Code Status: FULL CODE  Brief History/Pre-Procedure Diagnosis:   A fib      Consent: : I have discussed with the patient risks, benefits, and alternatives (and relevant risks, benefits, and side effects related to alternatives or not receiving care), and likelihood of the success. The patient and/or representative appear to understand and agree to proceed. The discussion encompasses risks, benefits, and side effects related to the alternatives and the risks related to not receiving the proposed care, treatment, and services. MEDICAL HISTORY  []ASHD/ANGINA/MI/CHF   [x]Hypertension  []Diabetes  []Hyperlipidemia  []Smoking  []Family Hx of ASHD  []Additional information:       has a past medical history of Acute systolic CHF (congestive heart failure) ([address] Alcohol abuse, Anomalous origin of right coronary artery, Anxiety disorder, Arthritis, Atrial fibrillation ([address] CAD (coronary artery disease), Cardiomyopathy ([address] Chronic kidney disease, Depression, Diabetes mellitus ([address] Drug abuse ([address] GERD (gastroesophageal reflux disease), H/O cardiac catheterization, Hyperlipidemia, Hypertension, Intradural extramedullary spinal tumor, Lumbar spine tumor, Medtronic dual icd , Neuromuscular disorder ([address] Other disorders of kidney and ureter in diseases classified elsewhere, Paroxysmal atrial fibrillation ([address] V tach ([address] and V-tach ([address] SURGICAL HISTORY   has a past surgical history that includes Cardiac catheterization (3/20/14 ); Coronary artery bypass graft (5-9-14); other surgical history (Left, 10/21/14); EKG 12 Lead (7/17/2015);  Cardiac defibrillator placement (10/13/2015); vascular surgery; pacemaker placement; and pr laminectomy,>2 sgmt,lumbar (N/A, 1/16/2018). Additional information:       ALLERGIES   Allergies as of 04/20/2022 - Fully Reviewed 04/20/2022   Allergen Reaction Noted    Latex  10/27/2020    Pcn [penicillins] Hives 06/01/2013    Zoloft [sertraline hcl] Anxiety 10/06/2015     Additional information:       MEDICATIONS   Aspirin  [x] 81 mg  [] 325 mg  [] None  Antiplatelet drug therapy use last 5 days  []No []Yes  Coumadin Use Last 5 Days []No []Yes  Other anticoagulant use last 5 days  []No []Yes    Current Facility-Administered Medications:     0.9 % sodium chloride infusion, , IntraVENous, Continuous, Ivy Simpson, PA-C, Last Rate: 75 mL/hr at 04/20/22 1425, New Bag at 04/20/22 1425    0.9 % sodium chloride infusion, 25 mL, IntraVENous, PRN, Ivy Simpson, PA-C    sodium chloride flush 0.9 % injection 5-40 mL, 5-40 mL, IntraVENous, 2 times per day, Ivy Simpson, PA-C    sodium chloride flush 0.9 % injection 5-40 mL, 5-40 mL, IntraVENous, PRN, Ivy Simpson, PA-C  Prior to Admission medications    Medication Sig Start Date End Date Taking? Authorizing Provider   warfarin (COUMADIN) 5 MG tablet USE AS DIRECTED BY COUMADIN CLINIC 4/12/22   ISABELLA Carcamo CNP   isosorbide mononitrate (IMDUR) 120 MG extended release tablet Take 1 tablet by mouth daily 4/6/22   ISABELLA Carcamo CNP   metoprolol succinate (TOPROL XL) 100 MG extended release tablet Take 1 tablet by mouth daily 3/14/22   Pamela Baltazar MD   mexiletine (MEXITIL) 150 MG capsule TAKE 2 CAPSULES THREE TIMES DAILY FOR ATRIAL FIBRILLATION 2/21/22   Pamela Baltazar MD   ONETOUCH ULTRA strip USE AS DIRECTED TWICE DAILY 11/24/21   ISABELLA Carcamo CNP   ONE TOUCH ULTRASOFT LANCETS MISC USE AS DIRECTED TWICE DAILY.  11/23/21   ISABELLA Carcamo CNP   doxazosin (CARDURA) 2 MG tablet Take 1 tablet by mouth daily 11/23/21   Ples Merry, APRN - CNP   Blood Glucose Monitoring Suppl (ONE TOUCH ULTRA 2) w/Device KIT 1 kit by Does not apply route 2 times daily 11/22/21   ISABELLA Berger CNP   insulin glargine (LANTUS SOLOSTAR) 100 UNIT/ML injection pen Inject 20 Units into the skin nightly Increase 2 units until morning sugar > 150 11/22/21   ISABELLA Berger CNP   Insulin Pen Needle 31G X 8 MM MISC 1 each by Does not apply route daily 11/22/21   ISABELLA Berger CNP   linagliptin (TRADJENTA) 5 MG tablet TAKE 1 TABLET BY MOUTH DAILY 10/6/21   ISABELLA Berger CNP   potassium chloride (KLOR-CON M) 20 MEQ extended release tablet Take 1 tablet by mouth daily 10/6/21   ISABELLA Berger CNP   atorvastatin (LIPITOR) 80 MG tablet TAKE 1 TABLET BY MOUTH EVERY NIGHT 8/30/21   ISABELLA Berger CNP   amiodarone (CORDARONE) 200 MG tablet Take 1 tablet by mouth daily 8/10/21   ISABELLA Mckeon CNP   hydrALAZINE (APRESOLINE) 100 MG tablet TAKE 1 TABLET BY MOUTH THREE TIMES DAILY 6/30/21   Fany Valencia MD   losartan (COZAAR) 50 MG tablet TAKE 1 TABLET BY MOUTH DAILY 6/21/21   Opal Gloria DO   ticagrelor (BRILINTA) 90 MG TABS tablet Take 1 tablet by mouth 2 times daily 6/11/21   Maru Ashton MD   bumetanide (BUMEX) 2 MG tablet TAKE 1 TABLET BY MOUTH DAILY 2/8/21   ISABELLA Gallo CNP   nitroGLYCERIN (NITROSTAT) 0.4 MG SL tablet Place 1 tablet under the tongue every 5 minutes as needed for Chest pain X 3 doses.  If chest pain continues seek medical attention  Patient not taking: Reported on 4/13/2022 3/14/18   ISABELLA Gallo CNP   glucose blood VI test strips (PHIL CONTOUR TEST) strip 1 each by In Vitro route daily 1/5/17   ISABELLA Berger CNP   acetaminophen 650 MG TABS Take 650 mg by mouth every 4 hours as needed 1/22/16   Michelle Colon MD     Additional information:       VITAL SIGNS   Vitals:    04/20/22 1418   BP: (!) 168/105   Pulse: 96   Resp: 14   Temp: 98.3 °F (36.8 °C)   SpO2: 98% PHYSICAL:   General: No acute distress  HEENT:  Unremarkable for age  Neck: without increased JVD, carotid pulses 2+ bilaterally without bruits  Heart: RRR, S1 & S2 WNL, S4 gallop, without murmurs or rubs    Lungs: Clear to auscultation    Abdomen: BS present, without HSM, masses, or tenderness    Extremities: without C,C,E.  Pulses 2+ bilaterally  Mental Status: Alert & Oriented        PLANNED PROCEDURE   []Cath  []PCI                []Pacemaker/AICD  [x]MYRIAM             [x]Cardioversion []Peripheral angiography/PTA  []Other:      SEDATION  Planned agent:[x]Midazolam []Meperidine [x]Sublimaze []Morphine  []Diazepam  []Other:     ASA Classification:  []1 [x]2 []3 []4 []5  Class 1: A normal healthy patient  Class 2: Pt with mild to moderate systemic disease  Class 3: Severe systemic disease or disturbance  Class 4: Severe systemic disorders that are already life threatening. Class 5: Moribund pt with little chances of survival, for more than 24 hours. Mallampati I Airway Classification:   []1 [x]2 []3 []4    [x]Pre-procedure diagnostic studies complete and results available. Comment:    [x]Previous sedation/anesthesia experiences assessed. Comment:    [x]The patient is an appropriate candidate to undergo the planned procedure sedation and anesthesia. (Refer to nursing sedation/analgesia documentation record)  [x]Formulation and discussion of sedation/procedure plan, risks, and expectations with patient and/or responsible adult completed. [x]Patient examined immediately prior to the procedure.  (Refer to nursing sedation/analgesia documentation record)    Leticia Dietrich MD MD [facility] [place]  Electronically signed 4/20/2022 at 3:15 PM

## 2022-04-20 NOTE — FLOWSHEET NOTE
1400 Pt admitted to  [facility] 1076 ambulatory for MYRIAM/cardioversion. Pt NPO. Patient accompanied by sister, Sourav Paz.   Vital signs obtained. Assessment and data collection initiated. Oriented to room. Policies and procedures for 2E explained   All questions answered with no further questions at this time. Fall prevention and safety precautions discussed with patient. 1530 patient to cath lab in stable condition.     1610 patient back , 1 shock in sinus rhythm    1650 patient had a positive gag and gave snacks and caffeine free soda

## 2022-04-20 NOTE — PLAN OF CARE
Problem: Discharge Planning  Goal: Discharge to home or other facility with appropriate resources  Outcome: Completed   . Omega Suarez Care plan reviewed with patient. Patient verbalize understanding of the plan of care and contribute to goal setting.

## 2022-04-21 ENCOUNTER — TELEPHONE (OUTPATIENT)
Dept: FAMILY MEDICINE CLINIC | Age: 57
End: 2022-04-21

## 2022-04-21 NOTE — TELEPHONE ENCOUNTER
[address] Transitions Initial Follow Up Call    Outreach made within 2 business days of discharge: Yes    Patient: Hugo Gilmore Patient : 1965   MRN: [id number]  Reason for Admission: There are no discharge diagnoses documented for the most recent discharge. Discharge Date: 22       Spoke with: Pt    Discharge department/facility: [facility]    TCM Interactive Patient Contact:  Was patient able to fill all prescriptions: N/A  Was patient instructed to bring all medications to the follow-up visit: Yes  Is patient taking all medications as directed in the discharge summary? Yes  Does patient understand their discharge instructions: Yes  Does patient have questions or concerns that need addressed prior to 7-14 day follow up office visit: no    Scheduled appointment with PCP within 7-14 days. Pt has a follow up appt with Cardio.     Follow Up  Future Appointments   Date Time Provider Tate Hunt   2022  3:20 PM MD ROSA Mahan [facility], [facility]. [facility] [place]   2022  1:15 PM MD ROSA Hyde [facility] [place]   2022  3:30 PM [facility]isneros [facility]N - [facility] [place]   10/3/2022  1:00 PM MD ROSA Hyde [facility] [place]   3/20/2023  1:30 PM SCHEDULE, [facility] PACER NURSE N [facility] PACER [facility] - [address], [address] ([address] [place] [address])

## 2022-04-22 ENCOUNTER — TELEPHONE (OUTPATIENT)
Dept: CARDIOLOGY CLINIC | Age: 57
End: 2022-04-22

## 2022-04-22 ENCOUNTER — NURSE ONLY (OUTPATIENT)
Dept: LAB | Age: 57
End: 2022-04-22

## 2022-04-22 DIAGNOSIS — I10 HTN (HYPERTENSION), BENIGN: ICD-10-CM

## 2022-04-22 DIAGNOSIS — N18.4 CKD (CHRONIC KIDNEY DISEASE) STAGE 4, GFR 15-29 ML/MIN (HCC): ICD-10-CM

## 2022-04-22 DIAGNOSIS — E11.21 DIABETIC NEPHROPATHY ASSOCIATED WITH TYPE 2 DIABETES MELLITUS (HCC): ICD-10-CM

## 2022-04-22 LAB
ALBUMIN SERPL-MCNC: 3.7 G/DL (ref 3.5–5.1)
ALP BLD-CCNC: 101 U/L (ref 38–126)
ALT SERPL-CCNC: 22 U/L (ref 11–66)
ANION GAP SERPL CALCULATED.3IONS-SCNC: 13 MEQ/L (ref 8–16)
AST SERPL-CCNC: 23 U/L (ref 5–40)
BACTERIA: ABNORMAL
BILIRUB SERPL-MCNC: 0.5 MG/DL (ref 0.3–1.2)
BILIRUBIN URINE: NEGATIVE
BLOOD, URINE: NEGATIVE
BUN BLDV-MCNC: 39 MG/DL (ref 7–22)
CALCIUM SERPL-MCNC: 8.4 MG/DL (ref 8.5–10.5)
CASTS: ABNORMAL /LPF
CASTS: ABNORMAL /LPF
CHARACTER, URINE: CLEAR
CHLORIDE BLD-SCNC: 104 MEQ/L (ref 98–111)
CO2: 23 MEQ/L (ref 23–33)
COLOR: YELLOW
CREAT SERPL-MCNC: 2.5 MG/DL (ref 0.4–1.2)
CREATININE, URINE: 78 MG/DL
CRYSTALS: ABNORMAL
EPITHELIAL CELLS, UA: ABNORMAL /HPF
ERYTHROCYTE [DISTWIDTH] IN BLOOD BY AUTOMATED COUNT: 14.7 % (ref 11.5–14.5)
ERYTHROCYTE [DISTWIDTH] IN BLOOD BY AUTOMATED COUNT: 55.2 FL (ref 35–45)
GFR SERPL CREATININE-BSD FRML MDRD: 27 ML/MIN/1.73M2
GLUCOSE BLD-MCNC: 151 MG/DL (ref 70–108)
GLUCOSE, URINE: 250 MG/DL
HCT VFR BLD CALC: 36.5 % (ref 42–52)
HEMOGLOBIN: 11.4 GM/DL (ref 14–18)
KETONES, URINE: NEGATIVE
LEUKOCYTE EST, POC: NEGATIVE
MCH RBC QN AUTO: 32.3 PG (ref 26–33)
MCHC RBC AUTO-ENTMCNC: 31.2 GM/DL (ref 32.2–35.5)
MCV RBC AUTO: 103.4 FL (ref 80–94)
MICROALBUMIN UR-MCNC: 211.76 MG/DL
MICROALBUMIN/CREAT UR-RTO: 2715 MG/G (ref 0–30)
MISCELLANEOUS LAB TEST RESULT: ABNORMAL
NITRITE, URINE: NEGATIVE
PH UA: 6.5 (ref 5–9)
PHOSPHORUS: 3.9 MG/DL (ref 2.4–4.7)
PLATELET # BLD: 131 THOU/MM3 (ref 130–400)
PMV BLD AUTO: 10.4 FL (ref 9.4–12.4)
POTASSIUM SERPL-SCNC: 4.6 MEQ/L (ref 3.5–5.2)
PROTEIN UA: >= 300 MG/DL
PTH INTACT: 169.2 PG/ML (ref 15–65)
RBC # BLD: 3.53 MILL/MM3 (ref 4.7–6.1)
RBC URINE: ABNORMAL /HPF
RENAL EPITHELIAL, UA: ABNORMAL
SODIUM BLD-SCNC: 140 MEQ/L (ref 135–145)
SPECIFIC GRAVITY UA: 1.02 (ref 1–1.03)
TOTAL PROTEIN: 6.1 G/DL (ref 6.1–8)
UROBILINOGEN, URINE: 0.2 EU/DL (ref 0–1)
VITAMIN D 25-HYDROXY: 17 NG/ML (ref 30–100)
WBC # BLD: 7.9 THOU/MM3 (ref 4.8–10.8)
WBC UA: ABNORMAL /HPF
YEAST: ABNORMAL

## 2022-04-22 NOTE — TELEPHONE ENCOUNTER
Pt had cardioversion 4/20/22. Pt c/o tingling in hands and feet and right shoulder hurting.     Pt is asking if this is normal

## 2022-04-25 ENCOUNTER — OFFICE VISIT (OUTPATIENT)
Dept: NEPHROLOGY | Age: 57
End: 2022-04-25
Payer: COMMERCIAL

## 2022-04-25 VITALS
BODY MASS INDEX: 36.19 KG/M2 | HEART RATE: 60 BPM | OXYGEN SATURATION: 97 % | SYSTOLIC BLOOD PRESSURE: 183 MMHG | DIASTOLIC BLOOD PRESSURE: 88 MMHG | HEIGHT: 74 IN | WEIGHT: 282 LBS | TEMPERATURE: 98 F

## 2022-04-25 DIAGNOSIS — F41.3 OTHER MIXED ANXIETY DISORDERS: ICD-10-CM

## 2022-04-25 DIAGNOSIS — E11.21 DIABETIC NEPHROPATHY ASSOCIATED WITH TYPE 2 DIABETES MELLITUS (HCC): ICD-10-CM

## 2022-04-25 DIAGNOSIS — N18.4 CKD (CHRONIC KIDNEY DISEASE) STAGE 4, GFR 15-29 ML/MIN (HCC): ICD-10-CM

## 2022-04-25 DIAGNOSIS — N28.89 RENAL MASS, LEFT: Primary | ICD-10-CM

## 2022-04-25 DIAGNOSIS — I10 HTN (HYPERTENSION), BENIGN: ICD-10-CM

## 2022-04-25 PROCEDURE — 3051F HG A1C>EQUAL 7.0%<8.0%: CPT | Performed by: INTERNAL MEDICINE

## 2022-04-25 PROCEDURE — 99214 OFFICE O/P EST MOD 30 MIN: CPT | Performed by: INTERNAL MEDICINE

## 2022-04-25 RX ORDER — ALPRAZOLAM 0.5 MG/1
TABLET ORAL
Qty: 30 TABLET | Refills: 5 | Status: SHIPPED | OUTPATIENT
Start: 2022-04-25 | End: 2022-10-22

## 2022-04-25 NOTE — PROGRESS NOTES
[facility]        Outpatient Follow-Up note         4/25/2022 4:08 PM    Patient Name:   Gail Macias:    1965  Primary Care Physician:  ISABELLA Gramajo CNP     Chief Complaint / Reason for follow-up : Follow Up of CKD      Interval History :  Patient seen and examined by me. No cp or SOB.   Had cardiversion Wednesday, no medication changes     Past History :  Past Medical History:   Diagnosis Date    Acute systolic CHF (congestive heart failure) ([address] 7/16/2015    Alcohol abuse     stopped drinking since 2012    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    Anxiety disorder     Arthritis     Atrial fibrillation ([address]     CAD (coronary artery disease) 3/25/2014    s/p CABG in may 2014    Cardiomyopathy ([address]     Chronic kidney disease     Depression     Diabetes mellitus ([address]     Drug abuse ([address]     hx of cacaine abuse, stopped abusing drugs in 2012    GERD (gastroesophageal reflux disease)     H/O cardiac catheterization 4/30/2014    Hyperlipidemia     Hypertension     Intradural extramedullary spinal tumor     Lumbar spine tumor     Medtronic dual icd      Neuromuscular disorder ([address]     Other disorders of kidney and ureter in diseases classified elsewhere     Paroxysmal atrial fibrillation ([address] 7/22/2014    V tach ([address]     V-tach [facility])     s/p ablation in dec 2014     Past Surgical History:   Procedure Laterality Date    C[facility]AC [facility]HETERIZ[facility]  [facility]/20/14     [facility]    CARDIAC DEFIBRILLATOR PLACEMENT  10/13/2015    [employer] [place], MRI CONDITIONAL ICD    CORONARY ARTERY BYPASS GRAFT  5-9-14    3 bypass    EKG 12-LEAD  7/17/2015         OTHER SURGICAL HISTORY Left 10/21/14    Left Bursectomy, I & D Left Elbow - Dr. Theodosia Klinefelter [facility]/16[facility]2018    LUMBAR AND SACRAL LAMINECTOMY, REMOVAL OF INTRASPINAL TUMORS performed by Felicia Bunch MD at [address] cabg harvest from legs        Medications :     Outpatient Medications Marked as Taking for the 4/25/22 encounter (Office Visit) with Neetu Chandra MD   Medication Sig Dispense Refill    ALPRAZolam (XANAX) 0.5 MG tablet TAKE 1 TABLET BY MOUTH AT BEDTIME FOR ANXIETY 30 tablet 5    warfarin (COUMADIN) 5 MG tablet USE AS DIRECTED BY COUMADIN CLINIC 180 tablet 5    isosorbide mononitrate (IMDUR) 120 MG extended release tablet Take 1 tablet by mouth daily 90 tablet 3    metoprolol succinate (TOPROL XL) 100 MG extended release tablet Take 1 tablet by mouth daily 90 tablet 3    mexiletine (MEXITIL) 150 MG capsule TAKE 2 CAPSULES THREE TIMES DAILY FOR ATRIAL FIBRILLATION 540 capsule 3    ONETOUCH ULTRA strip USE AS DIRECTED TWICE DAILY 150 strip 3    ONE TOUCH ULTRASOFT LANCETS MISC USE AS DIRECTED TWICE DAILY.  200 each 3    doxazosin (CARDURA) 2 MG tablet Take 1 tablet by mouth daily 90 tablet 3    Blood Glucose Monitoring Suppl (ONE TOUCH ULTRA 2) w/Device KIT 1 kit by Does not apply route 2 times daily 1 kit 0    insulin glargine (LANTUS SOLOSTAR) 100 UNIT/ML injection pen Inject 20 Units into the skin nightly Increase 2 units until morning sugar > 150 5 pen 3    Insulin Pen Needle 31G X 8 MM MISC 1 each by Does not apply route daily 100 each 3    linagliptin (TRADJENTA) 5 MG tablet TAKE 1 TABLET BY MOUTH DAILY 90 tablet 3    potassium chloride (KLOR-CON M) 20 MEQ extended release tablet Take 1 tablet by mouth daily 90 tablet 3    atorvastatin (LIPITOR) 80 MG tablet TAKE 1 TABLET BY MOUTH EVERY NIGHT 30 tablet 5    amiodarone (CORDARONE) 200 MG tablet Take 1 tablet by mouth daily 90 tablet 3    hydrALAZINE (APRESOLINE) 100 MG tablet TAKE 1 TABLET BY MOUTH THREE TIMES DAILY 270 tablet 3    losartan (COZAAR) 50 MG tablet TAKE 1 TABLET BY MOUTH DAILY 30 tablet 0    ticagrelor (BRILINTA) 90 MG TABS tablet Take 1 tablet by mouth 2 times daily 180 tablet 3    bumetanide (BUMEX) 2 MG tablet TAKE 1 TABLET BY MOUTH DAILY 90 tablet 3    nitroGLYCERIN (NITROSTAT) 0.4 MG SL tablet Place 1 tablet under the tongue every 5 minutes as needed for Chest pain X 3 doses. If chest pain continues seek medical attention 25 tablet 3    glucose blood VI test strips (PHIL CONTOUR TEST) strip 1 each by In Vitro route daily 300 each 3    acetaminophen 650 MG TABS Take 650 mg by mouth every 4 hours as needed 120 tablet 3       Vitals     BP (!) 183/88 (Site: Right Upper Arm, Position: Sitting, Cuff Size: Large Adult)   Pulse 60   Temp 98 °F (36.7 °C)   Ht 6' 2\" (1.88 m)   Wt 282 lb (127.9 kg)   SpO2 97%   BMI 36.21 kg/m²  Wt Readings from Last 3 Encounters:   04/25/22 282 lb (127.9 kg)   04/20/22 280 lb (127 kg)   04/13/22 280 lb 3.2 oz (127.1 kg)        Physical Exam     General -- no distress  Lungs -- clear  Heart -- S1, S2 heard, JVD - no  Abdomen - soft, non-tender  Extremities -- no edema  CNS - awake and alert    Labs, Radiology and Tests    Labs -    5/18 9/18 11/19 6/20 1/22 4/22      Potassium 4.4 4.1 4.1 4.1 4.7 4.6      BUN 27 27 29 22 42 39      Creatinine 1.4 1.8 1.7 1.6 2.7 2.5      eGFR 53 40 42 45 25 27                  UPCR +(UA) 1.3 6.03 2.5 3.61       UMCR 1.5 854 4168 1776 2512 2715                    4/22 - pth 169, Hb 11.2, Vitamin d - 17    Serologies -- 9/18  SRIDEVI -- negative  Hepatitis B -- negative  Hepatitis C -- negative  Complements C3 / C4 -- normal  Kappa / Lambda Ratio -- 3.22/2.89-1.11  Serum Protein Electropharesis -- normal pattern      Renal Ultrasound Scan --1/22  Right kidney 12.3 cm left kidney 4.2 cm  Complex lesion in the left kidney, measuring around 3 cm     CT scan of the abdomen - 5/18  1. Findings of constipation. Findings suggestive of cystitis. 2. Moderate bilateral perinephric stranding that has worsened since the study, suggesting possibility of acute bilateral pyelonephritis. Clinical correlation recommended. Assessment    1.  Renal - patient has CKD stage III most likely secondary to diabetic nephropathy and essential hypertension.  -Overall renal function appears to have much worsened - mostly progressive CKD . -now maintaining stable . Still has significant proteinuria    2. essential hypertension running slightly high, however at home it runs well  3. Proteinuria secondary to diabetes and serologic workup is negative. 4. Diabetes mellitus overall stable last A1c 8.1 on 11/2021  5. BPH on Flomax is urology   6. Complex renal mass we will get an MRI scan as advised by the ultrasound  7. meds reviewed and D/W patient  8. FU in 4 months    Tests and orders placed this Encounter     Orders Placed This Encounter   Procedures    MRI ABDOMEN W WO CONTRAST    Comprehensive Metabolic Panel    Vitamin D 25 Hydroxy    PTH, Intact    CBC       Philipp Villalba M.D  [facility].

## 2022-04-25 NOTE — TELEPHONE ENCOUNTER
Patient requesting refill of Xanax to [facility]. Please refill if appropriate.   Will check with pharmacy after 1pm

## 2022-05-11 ENCOUNTER — NURSE ONLY (OUTPATIENT)
Dept: CARDIOLOGY CLINIC | Age: 57
End: 2022-05-11
Payer: COMMERCIAL

## 2022-05-11 ENCOUNTER — OFFICE VISIT (OUTPATIENT)
Dept: CARDIOLOGY CLINIC | Age: 57
End: 2022-05-11
Payer: COMMERCIAL

## 2022-05-11 VITALS
SYSTOLIC BLOOD PRESSURE: 163 MMHG | WEIGHT: 282 LBS | HEART RATE: 58 BPM | DIASTOLIC BLOOD PRESSURE: 82 MMHG | HEIGHT: 74 IN | BODY MASS INDEX: 36.19 KG/M2

## 2022-05-11 DIAGNOSIS — Z95.810 S/P ICD (INTERNAL CARDIAC DEFIBRILLATOR) PROCEDURE: Primary | ICD-10-CM

## 2022-05-11 DIAGNOSIS — R07.9 CHEST PAIN, UNSPECIFIED TYPE: ICD-10-CM

## 2022-05-11 DIAGNOSIS — I48.21 PERMANENT ATRIAL FIBRILLATION (HCC): Primary | ICD-10-CM

## 2022-05-11 PROCEDURE — 93289 INTERROG DEVICE EVAL HEART: CPT | Performed by: INTERNAL MEDICINE

## 2022-05-11 PROCEDURE — 99204 OFFICE O/P NEW MOD 45 MIN: CPT | Performed by: INTERNAL MEDICINE

## 2022-05-11 PROCEDURE — 93000 ELECTROCARDIOGRAM COMPLETE: CPT | Performed by: INTERNAL MEDICINE

## 2022-05-11 PROCEDURE — 93290 INTERROG DEV EVAL ICPMS IP: CPT | Performed by: INTERNAL MEDICINE

## 2022-05-11 RX ORDER — NITROGLYCERIN 0.4 MG/1
0.4 TABLET SUBLINGUAL EVERY 5 MIN PRN
Qty: 25 TABLET | Refills: 1 | Status: CANCELLED | OUTPATIENT
Start: 2022-05-11

## 2022-05-11 NOTE — PROGRESS NOTES
medtronic dual ICD w/ jeremiah post CV 4/20/22  Last AF episode 4/14/22    . Douglas Will Battery longevity:  21 months   Presenting rhythm  AS VS     Atrial impedance 437  RV impedance 323  Shock 56    P wave sensing 1.5  R wave sensing 12.6    0.7 % atrial paced  0.1 % RV paced   optivol WNL    Atrial threshold 0.5 V  at 0.4ms  RV threshold 0.5 V at 0.4ms  Mode switches   As above

## 2022-05-11 NOTE — PROGRESS NOTES
[address] ) [address] [facility]  Dept: [phone]  Cardiac Electrophysiology: Consultation Note  Patient's demographics:  Date:   5/11/2022  Patient name:              Eneida Abdi  YOB: 1965  Sex:    male   MRN:   [id number]    Primary Care Physician:  Odalis Major, APRN - CNP    Referring Physician:  Irvin Sigala MD    Reason for Consultation:  Atrial fibrillation. Clinical Summary:  56/M with history of atrial fibrillation diagnosed postoperatively after his coronary artery bypass surgery in 2014. He spontaneously converted to sinus rhythm. He has history of ventricular tachycardia treated with ventricular tachycardia ablation in 2014 and has been on a combination of mexiletine and amiodarone since then. He reported intermittent palpitation for last 6 to 7 months and was seen by Dr. Mingo Bryant. He was noted to be in atrial fibrillation and underwent MYRIAM guided electrocardioversion. During a follow-up visit he was noted to have recurrences and was referred here for evaluation of catheter ablation. Patient is doing well today. No chest pain, shortness of breath, syncope or lower extremity swelling. Compliant with medications. PeAF sp DCCV 4/2022, CAD/CABG x3 (5/2014) and PCI-LAD (10/2020), ICM (LVEF 25-30%), VT ablation (2014) on amiodarone and mexilitine, Medtronic secondary prevention DC-AICD by Dr. George Melendez (10/2014),  anomalous origin of RCA, HTN, HPL, obesity,     Review of systems:  Constitutional: Negative for chills and fever  HENT: Negative for congestion, sinus pressure, sneezing and sore throat. Eyes: Negative for pain, discharge, redness and itching. Respiratory: Negative for apnea, cough  Gastrointestinal: Negative for blood in stool, constipation, diarrhea   Endocrine: Negative for cold intolerance, heat intolerance, polydipsia.   Genitourinary: Negative for dysuria, enuresis, flank pain and hematuria. Musculoskeletal: Negative for arthralgias, joint swelling and neck pain. Neurological: Negative for numbness and headaches. Psychiatric/Behavioral: Negative for agitation, confusion, decreased concentration and dysphoric mood.       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    Anxiety disorder     Arthritis     Atrial fibrillation ([address]     CAD (coronary artery disease) 3/25/2014    s/p CABG in may 2014    Cardiomyopathy ([address]     Chronic kidney disease     Depression     Diabetes mellitus ([address]     Drug abuse ([address]     hx of cacaine abuse, stopped abusing drugs in 2012    GERD (gastroesophageal reflux disease)     H/O cardiac catheterization 4/30/2014    Hyperlipidemia     Hypertension     Intradural extramedullary spinal tumor     Lumbar spine tumor     Medtronic dual icd      Neuromuscular disorder ([address]     Other disorders of kidney and ureter in diseases classified elsewhere     Paroxysmal atrial fibrillation ([address] 7/22/2014    V tach ([address]     V-tach [facility])     s/p ablation in dec 2014       Past Surgical History:  Past Surgical History:   Procedure Laterality Date    CARDIAC [facility]HETERIZATION  3/20/14     [facility]    CARDIAC DEFIBRILLATOR PLACEMENT  10/13/2015    [facility] [place], MRI CONDITIONAL ICD    CORONARY ARTERY BYPASS GRAFT  5-9-14    3 bypass    EKG 12-LEAD  7/17/2015         OTHER SURGICAL HISTORY Left 10/21/14    Left Bursectomy, I & D Left Elbow - Dr. Elmira Chu LAMINECTOMY,>2 [facility] [place] N/A 1/16/2018    LUMBAR AND SACRAL LAMINECTOMY, REMOVAL OF INTRASPINAL TUMORS performed by Pauline Moran MD at [id number] harvest from legs       Family History:  Family History   Problem Relation Age of Onset    Diabetes Mother  High Blood Pressure Mother     Stroke Mother     Diabetes Father     Heart Disease Father     High Blood Pressure Father     Heart Disease Sister         CABG    Diabetes Maternal Grandmother     Diabetes Maternal Grandfather     Heart Disease Paternal Grandfather         Social History:  Social History     Socioeconomic History    Marital status:      Spouse name: Not on file    Number of children: 3    Years of education: 15    Highest education level: High school graduate   Occupational History     Employer: [employer]   Tobacco Use    Smoking status: Former Smoker     Packs/day: 0.50     Years: 32.00     Pack years: 16.00     Types: Cigarettes     Start date: 1980     Quit date: 10/19/2020     Years since quittin.5    Smokeless tobacco: Current User     Types: Snuff   Vaping Use    Vaping Use: Never used   Substance and Sexual Activity    Alcohol use: Not Currently     Alcohol/week: 0.0 standard drinks    Drug use: No    Sexual activity: Not Currently     Partners: Female   Other Topics Concern    Not on file   Social History Narrative    Not on file     Social Determinants of Health     Financial Resource Strain: Low Risk     Difficulty of Paying Living Expenses: Not hard at all   Food Insecurity: No Food Insecurity    Worried About Running Out of Food in the Last Year: Never true    Goran of Food in the Last Year: Never true   Transportation Needs:     Lack of Transportation (Medical): Not on file    Lack of Transportation (Non-Medical):  Not on file   Physical Activity:     Days of Exercise per Week: Not on file    Minutes of Exercise per Session: Not on file   Stress:     Feeling of Stress : Not on file   Social Connections:     Frequency of Communication with Friends and Family: Not on file    Frequency of Social Gatherings with Friends and Family: Not on file    Attends Evangelical Services: Not on file    Active Member of Clubs or Organizations: Not on file    Attends Club or Organization Meetings: Not on file    Marital Status: Not on file   Intimate Partner Violence:     Fear of Current or Ex-Partner: Not on file    Emotionally Abused: Not on file    Physically Abused: Not on file    Sexually Abused: Not on file   Housing Stability:     Unable to Pay for Housing in the Last Year: Not on file    Number of Eduar in the Last Year: Not on file    Unstable Housing in the Last Year: Not on file        Allergies: Allergies   Allergen Reactions    Latex     Pcn [Penicillins] Hives    Zoloft [Sertraline Hcl] Anxiety     Worsened anxiety        Medications:  Current Outpatient Medications   Medication Sig Dispense Refill    ALPRAZolam (XANAX) 0.5 MG tablet TAKE 1 TABLET BY MOUTH AT BEDTIME FOR ANXIETY 30 tablet 5    warfarin (COUMADIN) 5 MG tablet USE AS DIRECTED BY COUMADIN CLINIC 180 tablet 5    isosorbide mononitrate (IMDUR) 120 MG extended release tablet Take 1 tablet by mouth daily 90 tablet 3    metoprolol succinate (TOPROL XL) 100 MG extended release tablet Take 1 tablet by mouth daily 90 tablet 3    mexiletine (MEXITIL) 150 MG capsule TAKE 2 CAPSULES THREE TIMES DAILY FOR ATRIAL FIBRILLATION 540 capsule 3    ONETOUCH ULTRA strip USE AS DIRECTED TWICE DAILY 150 strip 3    ONE TOUCH ULTRASOFT LANCETS MISC USE AS DIRECTED TWICE DAILY.  200 each 3    doxazosin (CARDURA) 2 MG tablet Take 1 tablet by mouth daily 90 tablet 3    Blood Glucose Monitoring Suppl (ONE TOUCH ULTRA 2) w/Device KIT 1 kit by Does not apply route 2 times daily 1 kit 0    insulin glargine (LANTUS SOLOSTAR) 100 UNIT/ML injection pen Inject 20 Units into the skin nightly Increase 2 units until morning sugar > 150 5 pen 3    Insulin Pen Needle 31G X 8 MM MISC 1 each by Does not apply route daily 100 each 3    linagliptin (TRADJENTA) 5 MG tablet TAKE 1 TABLET BY MOUTH DAILY 90 tablet 3    potassium chloride (KLOR-CON M) 20 MEQ extended release tablet Take 1 tablet by mouth daily 90 tablet 3    atorvastatin (LIPITOR) 80 MG tablet TAKE 1 TABLET BY MOUTH EVERY NIGHT 30 tablet 5    amiodarone (CORDARONE) 200 MG tablet Take 1 tablet by mouth daily 90 tablet 3    hydrALAZINE (APRESOLINE) 100 MG tablet TAKE 1 TABLET BY MOUTH THREE TIMES DAILY 270 tablet 3    losartan (COZAAR) 50 MG tablet TAKE 1 TABLET BY MOUTH DAILY 30 tablet 0    ticagrelor (BRILINTA) 90 MG TABS tablet Take 1 tablet by mouth 2 times daily 180 tablet 3    bumetanide (BUMEX) 2 MG tablet TAKE 1 TABLET BY MOUTH DAILY 90 tablet 3    nitroGLYCERIN (NITROSTAT) 0.4 MG SL tablet Place 1 tablet under the tongue every 5 minutes as needed for Chest pain X 3 doses. If chest pain continues seek medical attention 25 tablet 3    glucose blood VI test strips (PHIL CONTOUR TEST) strip 1 each by In Vitro route daily 300 each 3    acetaminophen 650 MG TABS Take 650 mg by mouth every 4 hours as needed 120 tablet 3     No current facility-administered medications for this visit. Physical Examination:  Vitals:    05/11/22 1534   BP: (!) 163/82   Pulse: 58     Body mass index is 36.21 kg/m². GENERAL: Alert and oriented. No distress. EYES: No pallor or icterus. ENT: No cyanosis. No thyromegaly or cervical LAP. VESSELS: No jugular venous distension or carotid bruits. HEART: Normal S1/S2. No murmur, rub or gallop. LUNGS: Clear to auscultation. CHEST WALL: No erosions, discharge or swelling at device site. ABDOMEN: Soft and non-tender. EXTREMITIES: No lower extremity edema. Feet are warm.    NEUROLOGICAL: Grossly normal.     Laboratory And Diagnostic Data  I have personally reviewed and interpreted the results of the following diagnostic testing    Lab Results   Component Value Date    WBC 7.9 04/22/2022    HGB 11.4 (L) 04/22/2022    HCT 36.5 (L) 04/22/2022     04/22/2022    CHOL 115 04/20/2022    TRIG 73 04/20/2022    HDL 39 04/20/2022    ALT 22 04/22/2022    AST 23 04/22/2022     04/22/2022    K 4.6 04/22/2022     04/22/2022    CREATININE 2.5 (H) 04/22/2022    BUN 39 (H) 04/22/2022    CO2 23 04/22/2022    TSH 5.750 (H) 02/07/2022    INR 3.70 (H) 05/05/2022    LABA1C 7.0 (H) 02/07/2022    LABMICR 211.76 04/22/2022      Echocardiogram LVEF 25-30%, LVWT 0.9 cm, LAD/LANI 70. No significant valvular abnormalities.  ECG 4/20/2022: AF with RVR. Normal QRS.  Coronary angiogram 10/2020: PCI-LAD. LIMA-LAD patent, SVG-D 100% ostial occluded, SVG-RCA patent.  Device interrogation Medtronic dual-chamber ICD, longevity more than 5 years, stable electrical parameters. VT/VF 0%.  AT/AF 0% following cardioversion. Impression:  · PAF s/p DCCV. [id number] score 3 (HTN, CHF and hx of MI). In sinus rhythm on amiodarone and warfarin. · CAD/CABG x3 (5/2014) and PCI-LAD (10/2020) on Brilinta. · ICM (LVEF 25-30%). · Hx VT ablation (2014) on amiodarone and mexilitine,   · Medtronic secondary prevention DC-AICD. Normal functions. Assessment And Recommendations: The patient is in sinus rhythm today. Reports feeling well. He has indication for antiarrhythmic therapy because of history of VT ablation. However, has remained VT free following his ablation in 2014. Discussed management options of his atrial fibrillation including continuation of antiarrhythmic therapy versus catheter ablation with hopes of getting him off amiodarone and possibly mexiletine. He has a trip coming up in June and would like to continue antiarrhythmic therapy at this time. We will continue following him remotely. If he has recurrences will proceed with A. fib ablation. Patient is in agreement. Thank you for allowing me to participate in the care of your patient. Please call me if you have any questions. **This report has been created using voice recognition software. It may contain minor errors which are inherent in voice recognition technology. **       Tonie Jon MD, MRCP, [facility] [place], [facility] on 5/11/2022 at 4:05 PM

## 2022-05-11 NOTE — PROGRESS NOTES
AFIB S/P CV 4-20-22      C/O SWELLING IN RIGHT LEG, OCCASIONAL CHEST PAIN AND OCCASIONAL PALPITATIONS

## 2022-05-17 ENCOUNTER — HOSPITAL ENCOUNTER (OUTPATIENT)
Dept: MRI IMAGING | Age: 57
Discharge: HOME | End: 2022-05-17
Payer: COMMERCIAL

## 2022-05-17 ENCOUNTER — PROCEDURE VISIT (OUTPATIENT)
Dept: CARDIOLOGY CLINIC | Age: 57
End: 2022-05-17
Payer: COMMERCIAL

## 2022-05-17 DIAGNOSIS — I50.20 SYSTOLIC CONGESTIVE HEART FAILURE, NYHA CLASS 2, UNSPECIFIED CONGESTIVE HEART FAILURE CHRONICITY (HCC): Primary | ICD-10-CM

## 2022-05-17 DIAGNOSIS — N28.89 RENAL MASS, LEFT: ICD-10-CM

## 2022-05-17 LAB — POC CREATININE WHOLE BLOOD: 2.5 MG/DL (ref 0.5–1.2)

## 2022-05-17 PROCEDURE — 93297 REM INTERROG DEV EVAL ICPMS: CPT | Performed by: NUCLEAR MEDICINE

## 2022-05-17 PROCEDURE — 74181 MRI ABDOMEN W/O CONTRAST: CPT

## 2022-05-17 PROCEDURE — G2066 INTER DEVC REMOTE 30D: HCPCS | Performed by: NUCLEAR MEDICINE

## 2022-05-17 PROCEDURE — 82565 ASSAY OF CREATININE: CPT

## 2022-05-19 ENCOUNTER — TELEPHONE (OUTPATIENT)
Dept: NEPHROLOGY | Age: 57
End: 2022-05-19

## 2022-05-19 DIAGNOSIS — N28.89 RENAL MASS: Primary | ICD-10-CM

## 2022-05-19 DIAGNOSIS — E11.22 CONTROLLED TYPE 2 DIABETES MELLITUS WITH CHRONIC KIDNEY DISEASE, WITHOUT LONG-TERM CURRENT USE OF INSULIN, UNSPECIFIED CKD STAGE (HCC): ICD-10-CM

## 2022-05-19 RX ORDER — BLOOD SUGAR DIAGNOSTIC
STRIP MISCELLANEOUS
Qty: 200 STRIP | Refills: 3 | Status: SHIPPED | OUTPATIENT
Start: 2022-05-19

## 2022-05-19 NOTE — TELEPHONE ENCOUNTER
----- Message from Jim Swartz MD sent at 5/18/2022  3:01 PM EDT -----  Please let the patient now that the MRI does not specifically say that he has a solid mass  So please refer him to urology for further evaluation please let the patient know this as well

## 2022-05-19 NOTE — TELEPHONE ENCOUNTER
Fax received from pharmacy for refill of One Touch Ultra Test Strips for 3 month supply. Please refill if appropriate.

## 2022-05-26 RX ORDER — TICAGRELOR 90 MG/1
TABLET ORAL
Qty: 180 TABLET | Refills: 0 | Status: SHIPPED | OUTPATIENT
Start: 2022-05-26 | End: 2022-08-22

## 2022-06-02 RX ORDER — ATORVASTATIN CALCIUM 80 MG/1
TABLET, FILM COATED ORAL
Qty: 30 TABLET | Refills: 5 | Status: SHIPPED | OUTPATIENT
Start: 2022-06-02

## 2022-06-02 NOTE — TELEPHONE ENCOUNTER
----- Message from [facility] sent at 6/2/2022 11:15 AM EDT -----  Subject: Refill Request    QUESTIONS  Name of Medication? atorvastatin (LIPITOR) 80 MG tablet  Patient-reported dosage and instructions? 80 mg 1xa day  How many days do you have left? 0  [id number]  Pharmacy phone number (if available)? [phone]  Additional Information for Provider? pt is requesting a 90 day supply  ---------------------------------------------------------------------------  --------------  CALL BACK INFO  What is the best way for the office to contact you? OK to leave message on   voicemail  Preferred Call Back Phone Number? [phone]  ---------------------------------------------------------------------------  --------------  SCRIPT ANSWERS  Relationship to Patient?  Self

## 2022-06-02 NOTE — TELEPHONE ENCOUNTER
Jas Barnes called requesting a refill of the below medication which has been pended for you:     Requested Prescriptions     Pending Prescriptions Disp Refills    atorvastatin (LIPITOR) 80 MG tablet 30 tablet 5     Sig: TAKE 1 TABLET BY MOUTH EVERY NIGHT       Last Appointment Date: 11/22/2021  Next Appointment Date: 8/22/2022    Allergies   Allergen Reactions    Latex     Pcn [Penicillins] Hives    Zoloft [Sertraline Hcl] Anxiety     Worsened anxiety

## 2022-06-14 DIAGNOSIS — I10 ESSENTIAL HYPERTENSION: ICD-10-CM

## 2022-06-14 RX ORDER — HYDRALAZINE HYDROCHLORIDE 100 MG/1
TABLET, FILM COATED ORAL
Qty: 270 TABLET | Refills: 3 | Status: SHIPPED | OUTPATIENT
Start: 2022-06-14

## 2022-06-20 ENCOUNTER — OFFICE VISIT (OUTPATIENT)
Dept: UROLOGY | Age: 57
End: 2022-06-20
Payer: COMMERCIAL

## 2022-06-20 VITALS
BODY MASS INDEX: 36.19 KG/M2 | WEIGHT: 282 LBS | DIASTOLIC BLOOD PRESSURE: 78 MMHG | SYSTOLIC BLOOD PRESSURE: 148 MMHG | HEIGHT: 74 IN

## 2022-06-20 DIAGNOSIS — N28.89 RENAL MASS: Primary | ICD-10-CM

## 2022-06-20 PROCEDURE — 99204 OFFICE O/P NEW MOD 45 MIN: CPT | Performed by: UROLOGY

## 2022-06-20 NOTE — PROGRESS NOTES
Dr. Anais Foster MD  [facility])  Urology Clinic  New Patient Visit      Patient:  Hugo Gilmore  YOB: 1965  Date: 6/20/2022    HISTORY OF PRESENT ILLNESS:   The patient is a 62 y.o. male who presents today for evaluation of the following problem(s): Bosniak 1 cysts throughout both kidneys. MRI 2022 was reviewed. Overall the problem(s) : show no change. Associated Symptoms: No dysuria, gross hematuria. Pain Severity: 0/10    Summary of old records:   Noen    Imaging/Labs reviewed during today's visit:  I have independently reviewed and verified the following films during today's visit. Mri reviewed w patient today. Last several PSA's:  Lab Results   Component Value Date    PSA 0.40 11/12/2021    PSA 0.59 06/22/2020    PSA 0.27 06/05/2019       Last total testosterone:  No results found for: TESTOSTERONE    Urinalysis today:  No results found for this visit on 06/20/22.       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    Anxiety disorder     Arthritis     Atrial fibrillation ([address]     CAD (coronary artery disease) 3/25/2014    s/p CABG in may 2014    Cardiomyopathy ([address]     Chronic kidney disease     Depression     Diabetes mellitus ([address]     Drug abuse ([address]     hx of cacaine abuse, stopped abusing drugs in 2012    GERD (gastroesophageal reflux disease)     H/O cardiac catheterization 4/30/2014    Hyperlipidemia     Hypertension     Intradural extramedullary spinal tumor     Lumbar spine tumor     Medtronic dual icd      [facility]     Other disorders of kidney and ureter in diseases classified elsewhere     Paroxysmal atrial fibrillation ([facility] 7/22/2014    V tach ([facility]     V[facility])     s/p ablation in dec 2014     Past Surgical History:   Procedure Laterality Date    [facility]/14     [facility]    CARDIAC DEFIBRILLATOR PLACEMENT  10/13/2015    [facility], MRI CONDITIONAL ICD    CORONARY ARTERY BYPASS GRAFT  5-9-14    3 bypass    EKG 12-LEAD  7/17/2015         OTHER SURGICAL HISTORY Left 10/21/14    Left Bursectomy, I & D Left Elbow - Dr. Mitchel Parks LAMINECTOMY,>2 [facility] N/A 1/16/2018    LUMBAR AND SACRAL LAMINECTOMY, REMOVAL OF INTRASPINAL TUMORS performed by Daryn Villegas MD at [id number] harvest from legs     Family History   Problem Relation Age of Onset    Diabetes Mother     High Blood Pressure Mother     Stroke Mother     Diabetes Father     Heart Disease Father     High Blood Pressure Father     Heart Disease Sister         CABG    Diabetes Maternal Grandmother     Diabetes Maternal Grandfather     Heart Disease Paternal Grandfather      Outpatient Medications Marked as Taking for the 6/20/22 encounter (Office Visit) with Phuong Sears MD   Medication Sig Dispense Refill    hydrALAZINE (APRESOLINE) 100 MG tablet TAKE 1 TABLET BY MOUTH THREE TIMES DAILY 270 tablet 3    atorvastatin (LIPITOR) 80 MG tablet TAKE 1 TABLET BY MOUTH EVERY NIGHT 30 tablet 5    BRILINTA 90 MG TABS tablet TAKE 1 TABLET BY MOUTH TWICE DAILY 180 tablet 0    blood glucose test strips (ONETOUCH ULTRA) strip USE AS DIRECTED TWICE DAILY 200 strip 3    ALPRAZolam (XANAX) 0.5 MG tablet TAKE 1 TABLET BY MOUTH AT BEDTIME FOR ANXIETY 30 tablet 5    warfarin (COUMADIN) 5 MG tablet USE AS DIRECTED BY COUMADIN CLINIC 180 tablet 5    isosorbide mononitrate (IMDUR) 120 MG extended release tablet Take 1 tablet by mouth daily 90 tablet 3    metoprolol succinate (TOPROL XL) 100 MG extended release tablet Take 1 tablet by mouth daily 90 tablet 3    mexiletine (MEXITIL) 150 MG capsule TAKE 2 CAPSULES THREE TIMES DAILY FOR ATRIAL FIBRILLATION 540 capsule 3    ONE TOUCH ULTRASOFT LANCETS MISC USE AS DIRECTED TWICE DAILY. 200 each 3    doxazosin (CARDURA) 2 MG tablet Take 1 tablet by mouth daily 90 tablet 3    Blood Glucose Monitoring Suppl (ONE TOUCH ULTRA 2) w/Device KIT 1 kit by Does not apply route 2 times daily 1 kit 0    insulin glargine (LANTUS SOLOSTAR) 100 UNIT/ML injection pen Inject 20 Units into the skin nightly Increase 2 units until morning sugar > 150 5 pen 3    Insulin Pen Needle 31G X 8 MM MISC 1 each by Does not apply route daily 100 each 3    linagliptin (TRADJENTA) 5 MG tablet TAKE 1 TABLET BY MOUTH DAILY 90 tablet 3    potassium chloride (KLOR-CON M) 20 MEQ extended release tablet Take 1 tablet by mouth daily 90 tablet 3    amiodarone (CORDARONE) 200 MG tablet Take 1 tablet by mouth daily 90 tablet 3    losartan (COZAAR) 50 MG tablet TAKE 1 TABLET BY MOUTH DAILY 30 tablet 0    bumetanide (BUMEX) 2 MG tablet TAKE 1 TABLET BY MOUTH DAILY 90 tablet 3    nitroGLYCERIN (NITROSTAT) 0.4 MG SL tablet Place 1 tablet under the tongue every 5 minutes as needed for Chest pain X 3 doses.  If chest pain continues seek medical attention 25 tablet 3    glucose blood VI test strips (PHIL CONTOUR TEST) strip 1 each by In Vitro route daily 300 each 3    acetaminophen 650 MG TABS Take 650 mg by mouth every 4 hours as needed 120 tablet 3       Latex, Pcn [penicillins], and Zoloft [sertraline hcl]  Social History     Tobacco Use   Smoking Status Current Some Day Smoker    Packs/day: 0.50    Types: Cigarettes    Start date: 7/16/1980   Smokeless Tobacco Current User    Types: Snuff       Social History     Substance and Sexual Activity   Alcohol Use Not Currently    Alcohol/week: 0.0 standard drinks       REVIEW OF SYSTEMS:  Constitutional: negative  Eyes: negative  Respiratory: negative  Cardiovascular: negative  Gastrointestinal: negative  Musculoskeletal: negative  Genitourinary: negative except for what is in HPI  Skin: negative   Neurological: negative  Hematological/Lymphatic: negative  Psychological: negative    Physical Exam:    This a 62 y.o. male   Vitals:    06/20/22 1446   BP: (!) 148/78     Constitutional: Patient in no acute distress; Neuro: alert and oriented to person place and time. Psych: Mood and affect normal.  Skin: Normal  Lungs: Respiratory effort normal  Cardiovascular:  Normal peripheral pulses  Abdomen: Soft, non-tender, non-distended with no CVA, flank pain, hepatosplenomegaly or hernia. Kidneys normal.  Bladder non-tender and not distended. Lymphatics: no palpable lymphadenopathy    Assessment and Plan      1. Renal mass           Plan:      No follow-ups on file. Bosniak 1 renal cysts. No concern. No tumors or cancerous lesions. No follow up needed for bosniak 1 cysts per protocol. See back PRN.           Dr. Penelope Arreola MD

## 2022-07-05 ENCOUNTER — APPOINTMENT (OUTPATIENT)
Dept: GENERAL RADIOLOGY | Age: 57
DRG: 918 | End: 2022-07-05
Payer: COMMERCIAL

## 2022-07-05 ENCOUNTER — HOSPITAL ENCOUNTER (INPATIENT)
Age: 57
LOS: 3 days | Discharge: HOME | DRG: 918 | End: 2022-07-08
Attending: INTERNAL MEDICINE | Admitting: INTERNAL MEDICINE
Payer: COMMERCIAL

## 2022-07-05 DIAGNOSIS — N18.9 CHRONIC KIDNEY DISEASE, UNSPECIFIED CKD STAGE: ICD-10-CM

## 2022-07-05 DIAGNOSIS — F10.10 ALCOHOL ABUSE: ICD-10-CM

## 2022-07-05 DIAGNOSIS — N17.9 AKI (ACUTE KIDNEY INJURY) (HCC): ICD-10-CM

## 2022-07-05 DIAGNOSIS — R07.9 CHEST PAIN, UNSPECIFIED TYPE: Primary | ICD-10-CM

## 2022-07-05 DIAGNOSIS — I50.9 ACUTE ON CHRONIC CONGESTIVE HEART FAILURE, UNSPECIFIED HEART FAILURE TYPE (HCC): ICD-10-CM

## 2022-07-05 DIAGNOSIS — Z86.79 HX OF CORONARY ARTERY DISEASE: ICD-10-CM

## 2022-07-05 DIAGNOSIS — R77.8 ELEVATED TROPONIN: ICD-10-CM

## 2022-07-05 DIAGNOSIS — F14.10 COCAINE ABUSE (HCC): ICD-10-CM

## 2022-07-05 PROBLEM — I20.0 UNSTABLE ANGINA (HCC): Status: ACTIVE | Noted: 2022-07-05

## 2022-07-05 LAB
ALBUMIN SERPL-MCNC: 3.6 G/DL (ref 3.5–5.1)
ALP BLD-CCNC: 98 U/L (ref 38–126)
ALT SERPL-CCNC: 34 U/L (ref 11–66)
AMPHETAMINE+METHAMPHETAMINE URINE SCREEN: POSITIVE
ANION GAP SERPL CALCULATED.3IONS-SCNC: 13 MEQ/L (ref 8–16)
AST SERPL-CCNC: 43 U/L (ref 5–40)
BARBITURATE QUANTITATIVE URINE: NEGATIVE
BASOPHILS # BLD: 0.5 %
BASOPHILS ABSOLUTE: 0 THOU/MM3 (ref 0–0.1)
BENZODIAZEPINE QUANTITATIVE URINE: NEGATIVE
BILIRUB SERPL-MCNC: 0.4 MG/DL (ref 0.3–1.2)
BILIRUBIN DIRECT: < 0.2 MG/DL (ref 0–0.3)
BUN BLDV-MCNC: 38 MG/DL (ref 7–22)
CALCIUM SERPL-MCNC: 8.5 MG/DL (ref 8.5–10.5)
CANNABINOID QUANTITATIVE URINE: NEGATIVE
CHLORIDE BLD-SCNC: 105 MEQ/L (ref 98–111)
CO2: 18 MEQ/L (ref 23–33)
COCAINE METABOLITE QUANTITATIVE URINE: POSITIVE
CREAT SERPL-MCNC: 2.6 MG/DL (ref 0.4–1.2)
EKG ATRIAL RATE: 70 BPM
EKG P AXIS: 17 DEGREES
EKG P-R INTERVAL: 192 MS
EKG Q-T INTERVAL: 408 MS
EKG QRS DURATION: 86 MS
EKG QTC CALCULATION (BAZETT): 440 MS
EKG R AXIS: -7 DEGREES
EKG T AXIS: 84 DEGREES
EKG VENTRICULAR RATE: 70 BPM
EOSINOPHIL # BLD: 0.5 %
EOSINOPHILS ABSOLUTE: 0 THOU/MM3 (ref 0–0.4)
ERYTHROCYTE [DISTWIDTH] IN BLOOD BY AUTOMATED COUNT: 13.4 % (ref 11.5–14.5)
ERYTHROCYTE [DISTWIDTH] IN BLOOD BY AUTOMATED COUNT: 48.2 FL (ref 35–45)
ETHYL ALCOHOL, SERUM: 0.09 %
GFR SERPL CREATININE-BSD FRML MDRD: 26 ML/MIN/1.73M2
GLUCOSE BLD-MCNC: 123 MG/DL (ref 70–108)
HCT VFR BLD CALC: 36.3 % (ref 42–52)
HEMOGLOBIN: 12.2 GM/DL (ref 14–18)
IMMATURE GRANS (ABS): 0.04 THOU/MM3 (ref 0–0.07)
IMMATURE GRANULOCYTES: 0.7 %
INR BLD: 3.46 (ref 0.85–1.13)
LYMPHOCYTES # BLD: 9 %
LYMPHOCYTES ABSOLUTE: 0.5 THOU/MM3 (ref 1–4.8)
MAGNESIUM: 2.5 MG/DL (ref 1.6–2.4)
MCH RBC QN AUTO: 32.8 PG (ref 26–33)
MCHC RBC AUTO-ENTMCNC: 33.6 GM/DL (ref 32.2–35.5)
MCV RBC AUTO: 97.6 FL (ref 80–94)
MONOCYTES # BLD: 15.3 %
MONOCYTES ABSOLUTE: 0.9 THOU/MM3 (ref 0.4–1.3)
NUCLEATED RED BLOOD CELLS: 0 /100 WBC
OPIATES, URINE: POSITIVE
OSMOLALITY CALCULATION: 282.4 MOSMOL/KG (ref 275–300)
OXYCODONE: NEGATIVE
PHENCYCLIDINE QUANTITATIVE URINE: NEGATIVE
PLATELET # BLD: 112 THOU/MM3 (ref 130–400)
PMV BLD AUTO: 10.7 FL (ref 9.4–12.4)
POTASSIUM REFLEX MAGNESIUM: 4.4 MEQ/L (ref 3.5–5.2)
PRO-BNP: 4272 PG/ML (ref 0–900)
RBC # BLD: 3.72 MILL/MM3 (ref 4.7–6.1)
SEG NEUTROPHILS: 74 %
SEGMENTED NEUTROPHILS ABSOLUTE COUNT: 4.4 THOU/MM3 (ref 1.8–7.7)
SODIUM BLD-SCNC: 136 MEQ/L (ref 135–145)
TOTAL PROTEIN: 5.8 G/DL (ref 6.1–8)
TROPONIN T: 0.12 NG/ML
WBC # BLD: 6 THOU/MM3 (ref 4.8–10.8)

## 2022-07-05 PROCEDURE — 83880 ASSAY OF NATRIURETIC PEPTIDE: CPT

## 2022-07-05 PROCEDURE — 85610 PROTHROMBIN TIME: CPT

## 2022-07-05 PROCEDURE — 84484 ASSAY OF TROPONIN QUANT: CPT

## 2022-07-05 PROCEDURE — 80076 HEPATIC FUNCTION PANEL: CPT

## 2022-07-05 PROCEDURE — 6360000002 HC RX W HCPCS: Performed by: INTERNAL MEDICINE

## 2022-07-05 PROCEDURE — 84443 ASSAY THYROID STIM HORMONE: CPT

## 2022-07-05 PROCEDURE — 6370000000 HC RX 637 (ALT 250 FOR IP): Performed by: INTERNAL MEDICINE

## 2022-07-05 PROCEDURE — 83735 ASSAY OF MAGNESIUM: CPT

## 2022-07-05 PROCEDURE — 2060000000 HC ICU INTERMEDIATE R&B

## 2022-07-05 PROCEDURE — 99223 1ST HOSP IP/OBS HIGH 75: CPT | Performed by: INTERNAL MEDICINE

## 2022-07-05 PROCEDURE — 99285 EMERGENCY DEPT VISIT HI MDM: CPT

## 2022-07-05 PROCEDURE — 96374 THER/PROPH/DIAG INJ IV PUSH: CPT

## 2022-07-05 PROCEDURE — 93010 ELECTROCARDIOGRAM REPORT: CPT | Performed by: INTERNAL MEDICINE

## 2022-07-05 PROCEDURE — 6360000002 HC RX W HCPCS: Performed by: PHYSICIAN ASSISTANT

## 2022-07-05 PROCEDURE — 85025 COMPLETE CBC W/AUTO DIFF WBC: CPT

## 2022-07-05 PROCEDURE — 80307 DRUG TEST PRSMV CHEM ANLYZR: CPT

## 2022-07-05 PROCEDURE — 71045 X-RAY EXAM CHEST 1 VIEW: CPT

## 2022-07-05 PROCEDURE — 80048 BASIC METABOLIC PNL TOTAL CA: CPT

## 2022-07-05 PROCEDURE — 93005 ELECTROCARDIOGRAM TRACING: CPT | Performed by: INTERNAL MEDICINE

## 2022-07-05 PROCEDURE — 82077 ASSAY SPEC XCP UR&BREATH IA: CPT

## 2022-07-05 PROCEDURE — 83540 ASSAY OF IRON: CPT

## 2022-07-05 PROCEDURE — 83036 HEMOGLOBIN GLYCOSYLATED A1C: CPT

## 2022-07-05 PROCEDURE — 96375 TX/PRO/DX INJ NEW DRUG ADDON: CPT

## 2022-07-05 RX ORDER — LORAZEPAM 2 MG/ML
1 INJECTION INTRAMUSCULAR
Status: DISCONTINUED | OUTPATIENT
Start: 2022-07-05 | End: 2022-07-08 | Stop reason: HOSPADM

## 2022-07-05 RX ORDER — ONDANSETRON 2 MG/ML
4 INJECTION INTRAMUSCULAR; INTRAVENOUS ONCE
Status: COMPLETED | OUTPATIENT
Start: 2022-07-05 | End: 2022-07-05

## 2022-07-05 RX ORDER — SODIUM CHLORIDE 0.9 % (FLUSH) 0.9 %
10 SYRINGE (ML) INJECTION EVERY 12 HOURS SCHEDULED
Status: DISCONTINUED | OUTPATIENT
Start: 2022-07-05 | End: 2022-07-08 | Stop reason: HOSPADM

## 2022-07-05 RX ORDER — ACETAMINOPHEN 325 MG/1
650 TABLET ORAL EVERY 6 HOURS PRN
Status: DISCONTINUED | OUTPATIENT
Start: 2022-07-05 | End: 2022-07-08 | Stop reason: HOSPADM

## 2022-07-05 RX ORDER — SODIUM CHLORIDE 0.9 % (FLUSH) 0.9 %
5-40 SYRINGE (ML) INJECTION PRN
Status: DISCONTINUED | OUTPATIENT
Start: 2022-07-05 | End: 2022-07-05 | Stop reason: SDUPTHER

## 2022-07-05 RX ORDER — INSULIN LISPRO 100 [IU]/ML
0-6 INJECTION, SOLUTION INTRAVENOUS; SUBCUTANEOUS NIGHTLY
Status: DISCONTINUED | OUTPATIENT
Start: 2022-07-06 | End: 2022-07-08 | Stop reason: HOSPADM

## 2022-07-05 RX ORDER — LORAZEPAM 1 MG/1
2 TABLET ORAL
Status: DISCONTINUED | OUTPATIENT
Start: 2022-07-05 | End: 2022-07-08 | Stop reason: HOSPADM

## 2022-07-05 RX ORDER — NITROGLYCERIN 0.4 MG/1
0.4 TABLET SUBLINGUAL EVERY 5 MIN PRN
Status: DISCONTINUED | OUTPATIENT
Start: 2022-07-05 | End: 2022-07-05

## 2022-07-05 RX ORDER — DOXAZOSIN 2 MG/1
2 TABLET ORAL DAILY
Status: DISCONTINUED | OUTPATIENT
Start: 2022-07-05 | End: 2022-07-08 | Stop reason: HOSPADM

## 2022-07-05 RX ORDER — LORAZEPAM 2 MG/ML
3 INJECTION INTRAMUSCULAR
Status: DISCONTINUED | OUTPATIENT
Start: 2022-07-05 | End: 2022-07-08 | Stop reason: HOSPADM

## 2022-07-05 RX ORDER — LORAZEPAM 1 MG/1
1 TABLET ORAL
Status: DISCONTINUED | OUTPATIENT
Start: 2022-07-05 | End: 2022-07-08 | Stop reason: HOSPADM

## 2022-07-05 RX ORDER — LORAZEPAM 2 MG/ML
4 INJECTION INTRAMUSCULAR
Status: DISCONTINUED | OUTPATIENT
Start: 2022-07-05 | End: 2022-07-08 | Stop reason: HOSPADM

## 2022-07-05 RX ORDER — LORAZEPAM 1 MG/1
4 TABLET ORAL
Status: DISCONTINUED | OUTPATIENT
Start: 2022-07-05 | End: 2022-07-08 | Stop reason: HOSPADM

## 2022-07-05 RX ORDER — INSULIN LISPRO 100 [IU]/ML
0-12 INJECTION, SOLUTION INTRAVENOUS; SUBCUTANEOUS
Status: DISCONTINUED | OUTPATIENT
Start: 2022-07-06 | End: 2022-07-08 | Stop reason: HOSPADM

## 2022-07-05 RX ORDER — MEXILETINE HYDROCHLORIDE 150 MG/1
300 CAPSULE ORAL EVERY 8 HOURS SCHEDULED
Status: DISCONTINUED | OUTPATIENT
Start: 2022-07-05 | End: 2022-07-08 | Stop reason: HOSPADM

## 2022-07-05 RX ORDER — SODIUM CHLORIDE 9 MG/ML
INJECTION, SOLUTION INTRAVENOUS PRN
Status: DISCONTINUED | OUTPATIENT
Start: 2022-07-05 | End: 2022-07-08 | Stop reason: HOSPADM

## 2022-07-05 RX ORDER — AMIODARONE HYDROCHLORIDE 200 MG/1
200 TABLET ORAL DAILY
Status: DISCONTINUED | OUTPATIENT
Start: 2022-07-05 | End: 2022-07-08 | Stop reason: HOSPADM

## 2022-07-05 RX ORDER — NICOTINE 21 MG/24HR
1 PATCH, TRANSDERMAL 24 HOURS TRANSDERMAL DAILY
Status: DISCONTINUED | OUTPATIENT
Start: 2022-07-06 | End: 2022-07-08 | Stop reason: HOSPADM

## 2022-07-05 RX ORDER — ONDANSETRON 2 MG/ML
4 INJECTION INTRAMUSCULAR; INTRAVENOUS EVERY 6 HOURS PRN
Status: DISCONTINUED | OUTPATIENT
Start: 2022-07-05 | End: 2022-07-08 | Stop reason: HOSPADM

## 2022-07-05 RX ORDER — LANOLIN ALCOHOL/MO/W.PET/CERES
100 CREAM (GRAM) TOPICAL DAILY
Status: DISCONTINUED | OUTPATIENT
Start: 2022-07-06 | End: 2022-07-08 | Stop reason: HOSPADM

## 2022-07-05 RX ORDER — ASPIRIN 81 MG/1
324 TABLET, CHEWABLE ORAL ONCE
Status: DISCONTINUED | OUTPATIENT
Start: 2022-07-05 | End: 2022-07-05

## 2022-07-05 RX ORDER — MORPHINE SULFATE 2 MG/ML
2 INJECTION, SOLUTION INTRAMUSCULAR; INTRAVENOUS ONCE
Status: COMPLETED | OUTPATIENT
Start: 2022-07-05 | End: 2022-07-05

## 2022-07-05 RX ORDER — ACETAMINOPHEN 650 MG/1
650 SUPPOSITORY RECTAL EVERY 6 HOURS PRN
Status: DISCONTINUED | OUTPATIENT
Start: 2022-07-05 | End: 2022-07-08 | Stop reason: HOSPADM

## 2022-07-05 RX ORDER — LORAZEPAM 2 MG/ML
1 INJECTION INTRAMUSCULAR ONCE
Status: COMPLETED | OUTPATIENT
Start: 2022-07-05 | End: 2022-07-05

## 2022-07-05 RX ORDER — LORAZEPAM 2 MG/ML
2 INJECTION INTRAMUSCULAR
Status: DISCONTINUED | OUTPATIENT
Start: 2022-07-05 | End: 2022-07-08 | Stop reason: HOSPADM

## 2022-07-05 RX ORDER — ONDANSETRON 4 MG/1
4 TABLET, ORALLY DISINTEGRATING ORAL EVERY 8 HOURS PRN
Status: DISCONTINUED | OUTPATIENT
Start: 2022-07-05 | End: 2022-07-08 | Stop reason: HOSPADM

## 2022-07-05 RX ORDER — SODIUM CHLORIDE 0.9 % (FLUSH) 0.9 %
5-40 SYRINGE (ML) INJECTION EVERY 12 HOURS SCHEDULED
Status: DISCONTINUED | OUTPATIENT
Start: 2022-07-05 | End: 2022-07-05 | Stop reason: SDUPTHER

## 2022-07-05 RX ORDER — MEXILETINE HYDROCHLORIDE 150 MG/1
150 CAPSULE ORAL EVERY 8 HOURS SCHEDULED
Status: DISCONTINUED | OUTPATIENT
Start: 2022-07-05 | End: 2022-07-05 | Stop reason: ALTCHOICE

## 2022-07-05 RX ORDER — BUMETANIDE 1 MG/1
2 TABLET ORAL DAILY
Status: DISCONTINUED | OUTPATIENT
Start: 2022-07-05 | End: 2022-07-08 | Stop reason: HOSPADM

## 2022-07-05 RX ORDER — LORAZEPAM 1 MG/1
3 TABLET ORAL
Status: DISCONTINUED | OUTPATIENT
Start: 2022-07-05 | End: 2022-07-08 | Stop reason: HOSPADM

## 2022-07-05 RX ORDER — NITROGLYCERIN 0.4 MG/1
0.4 TABLET SUBLINGUAL EVERY 5 MIN PRN
Status: DISCONTINUED | OUTPATIENT
Start: 2022-07-05 | End: 2022-07-08 | Stop reason: HOSPADM

## 2022-07-05 RX ORDER — ISOSORBIDE MONONITRATE 60 MG/1
120 TABLET, EXTENDED RELEASE ORAL DAILY
Status: DISCONTINUED | OUTPATIENT
Start: 2022-07-05 | End: 2022-07-08 | Stop reason: HOSPADM

## 2022-07-05 RX ORDER — HYDRALAZINE HYDROCHLORIDE 50 MG/1
100 TABLET, FILM COATED ORAL EVERY 8 HOURS SCHEDULED
Status: DISCONTINUED | OUTPATIENT
Start: 2022-07-05 | End: 2022-07-08 | Stop reason: HOSPADM

## 2022-07-05 RX ORDER — SODIUM CHLORIDE 0.9 % (FLUSH) 0.9 %
10 SYRINGE (ML) INJECTION PRN
Status: DISCONTINUED | OUTPATIENT
Start: 2022-07-05 | End: 2022-07-08 | Stop reason: HOSPADM

## 2022-07-05 RX ORDER — POLYETHYLENE GLYCOL 3350 17 G/17G
17 POWDER, FOR SOLUTION ORAL DAILY PRN
Status: DISCONTINUED | OUTPATIENT
Start: 2022-07-05 | End: 2022-07-08 | Stop reason: HOSPADM

## 2022-07-05 RX ORDER — ATORVASTATIN CALCIUM 80 MG/1
80 TABLET, FILM COATED ORAL DAILY
Status: DISCONTINUED | OUTPATIENT
Start: 2022-07-05 | End: 2022-07-08 | Stop reason: HOSPADM

## 2022-07-05 RX ORDER — SODIUM CHLORIDE 9 MG/ML
INJECTION, SOLUTION INTRAVENOUS PRN
Status: DISCONTINUED | OUTPATIENT
Start: 2022-07-05 | End: 2022-07-05 | Stop reason: SDUPTHER

## 2022-07-05 RX ADMIN — LORAZEPAM 1 MG: 2 INJECTION INTRAMUSCULAR; INTRAVENOUS at 16:44

## 2022-07-05 RX ADMIN — HYDRALAZINE HYDROCHLORIDE 100 MG: 50 TABLET, FILM COATED ORAL at 23:22

## 2022-07-05 RX ADMIN — LORAZEPAM 2 MG: 2 INJECTION INTRAMUSCULAR; INTRAVENOUS at 20:38

## 2022-07-05 RX ADMIN — TICAGRELOR 90 MG: 90 TABLET ORAL at 23:24

## 2022-07-05 RX ADMIN — ONDANSETRON 4 MG: 2 INJECTION INTRAMUSCULAR; INTRAVENOUS at 18:24

## 2022-07-05 RX ADMIN — DOXAZOSIN 2 MG: 2 TABLET ORAL at 23:25

## 2022-07-05 RX ADMIN — ISOSORBIDE MONONITRATE 120 MG: 60 TABLET, EXTENDED RELEASE ORAL at 23:24

## 2022-07-05 RX ADMIN — MORPHINE SULFATE 2 MG: 2 INJECTION, SOLUTION INTRAMUSCULAR; INTRAVENOUS at 18:24

## 2022-07-05 RX ADMIN — BUMETANIDE 2 MG: 1 TABLET ORAL at 23:23

## 2022-07-05 RX ADMIN — LORAZEPAM 2 MG: 2 INJECTION INTRAMUSCULAR; INTRAVENOUS at 21:49

## 2022-07-05 ASSESSMENT — ENCOUNTER SYMPTOMS
VOMITING: 0
DIARRHEA: 0
ALLERGIC/IMMUNOLOGIC NEGATIVE: 1
GASTROINTESTINAL NEGATIVE: 1
SORE THROAT: 0
PHOTOPHOBIA: 0
NAUSEA: 0
COUGH: 0
ABDOMINAL PAIN: 0
SHORTNESS OF BREATH: 1
RHINORRHEA: 0
EYES NEGATIVE: 1

## 2022-07-05 ASSESSMENT — PAIN DESCRIPTION - LOCATION
LOCATION: CHEST

## 2022-07-05 ASSESSMENT — HEART SCORE: ECG: 0

## 2022-07-05 ASSESSMENT — PAIN - FUNCTIONAL ASSESSMENT
PAIN_FUNCTIONAL_ASSESSMENT: NONE - DENIES PAIN
PAIN_FUNCTIONAL_ASSESSMENT: 0-10
PAIN_FUNCTIONAL_ASSESSMENT: 0-10
PAIN_FUNCTIONAL_ASSESSMENT: NONE - DENIES PAIN
PAIN_FUNCTIONAL_ASSESSMENT: 0-10
PAIN_FUNCTIONAL_ASSESSMENT: 0-10

## 2022-07-05 ASSESSMENT — PAIN SCALES - GENERAL
PAINLEVEL_OUTOF10: 10
PAINLEVEL_OUTOF10: 6
PAINLEVEL_OUTOF10: 6

## 2022-07-05 NOTE — ED NOTES
RN provided patient with snack per request. Pt respirations even and unlabored. Pt states pain is 6/10 after medication.       Cristy Camarillo RN  07/05/22 1265

## 2022-07-05 NOTE — H&P
Patient: Danette Melara    Unit/Bed: 31/031A    YOB: 1965    MRN: [id number]    Acct: [de-identified]     Admitting Diagnosis: Unstable angina ([facility] [address] [I20.0]    Admit Date:  7/5/2022    CC: Chest pain x 1 day. HPI :  68-year-old male patient with present to the ED today with 1 day h/o left-sided 8/10 nonradiating chest pain. Chest pain is now 4/10 intensity at the time of interview. Mild shortness of breath, no palpitation, no dizziness,   no diaphoresis. He has a history of CAD,CABG and polysubstance abuse. Patient used cocaine in the last 24 hours. Active chronic drinker -drinks whiskey and beers. Last drink was this afternoon at 3 PM.    Patient feels anxious at the time of interview. Initial vital signs in the ED temperature 37.0 °C, respiratory 22, heart rate 66, blood pressure 147/76, oxygen saturation 99% on room air. Initial labs in the ED serum sodium 136, potassium 4.4, chloride 105, CO2 is 18, BUN 38, creatinine 2.6  From 2.5 on 4/22/2022. Blood sugar 123, calcium 8.5, serum osmolality 282.4, total protein 5.8, proBNP 4272, troponin infested 0.121, albumin 3.6, alkaline phosphate 98, ALT 34, AST 43, total bilirubin 0.4, serum alcohol level 0.09. WBC 6.0, hemoglobin 12.2, MCV 97.6, platelets 375, INR 5.34. A twelve-lead EKG which reviewed shows normal sinus rhythm at 70 bpm.  QTC of 440 ms. No ST segment elevation or depression. Chest x-ray which I reviewed shows moderate cardiomegaly with mild pulmonary congestion. AICD in situ    ED treatment included IV morphine 2 mg x 1 dose, IV Ativan 1 mg, IV Zofran 4 mg x 1 dose. Review of Systems   Constitutional: Negative. HENT: Negative. Eyes: Negative. Respiratory: Positive for shortness of breath. Cardiovascular: Positive for chest pain. Gastrointestinal: Negative. Endocrine: Negative. Genitourinary: Negative. Musculoskeletal: Negative. Skin: Negative.     Allergic/Immunologic: Negative. Neurological: Negative. Hematological: Negative. Psychiatric/Behavioral: Negative. All other systems reviewed and are negative. 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    Anxiety disorder     Arthritis     Atrial fibrillation ([address]     CAD (coronary artery disease) 3/25/2014    s/p CABG in may 2014    Cardiomyopathy ([address]     Chronic kidney disease     Depression     Diabetes mellitus ([address]     Drug abuse ([address]     hx of cacaine abuse, stopped abusing drugs in 2012    GERD (gastroesophageal reflux disease)     H/O cardiac catheterization 4/30/2014    Hyperlipidemia     Hypertension     Intradural extramedullary spinal tumor     Lumbar spine tumor     Medtronic dual icd      Neuromuscular disorder ([address]     Other disorders of kidney and ureter in diseases classified elsewhere     Paroxysmal atrial fibrillation ([address] 7/22/2014    V tach ([facility] [address]     V-tach [facility])     s/p ablation in dec 2014       Past Surgical History:        Procedure Laterality Date    [facility]    CARDIAC DEFIBRILLATOR PLACEMENT  10/13/2015    [employer] [place], MRI CONDITIONAL ICD    CORONARY ARTERY BYPASS GRAFT  5-9-14    3 bypass    EKG 12-LEAD  7/17/2015         OTHER SURGICAL HISTORY Left 10/21/14    Left Bursectomy, I & D Left Elbow - Dr. Pearla Hamman LAMINECTOMY,>2 [facility] N/A 1/16/2018    LUMBAR AND SACRAL LAMINECTOMY, REMOVAL OF INTRASPINAL TUMORS performed by Nafisa Johnson MD at [id number] harvest from legs       Medications Prior to Admission:    Prior to Admission medications    Medication Sig Start Date End Date Taking?  Authorizing Provider   hydrALAZINE (APRESOLINE) 100 MG tablet TAKE 1 TABLET BY MOUTH THREE TIMES DAILY 6/14/22   Amira Thorpe MD   atorvastatin (LIPITOR) 80 MG tablet TAKE 1 TABLET BY MOUTH EVERY NIGHT 6/2/22   ISABELLA Thompson CNP   BRILINTA 90 MG TABS tablet TAKE 1 TABLET BY MOUTH TWICE DAILY 5/26/22   Fany Mcallister MD   blood glucose test strips (ONETOUCH ULTRA) strip USE AS DIRECTED TWICE DAILY 5/19/22   ISABELLA Thompson CNP   ALPRAZolam Robbin Patience) 0.5 MG tablet TAKE 1 TABLET BY MOUTH AT BEDTIME FOR ANXIETY 4/25/22 10/22/22  ISABELLA Thompson CNP   warfarin (COUMADIN) 5 MG tablet USE AS DIRECTED BY COUMADIN CLINIC 4/12/22   ISABELLA Thompson CNP   isosorbide mononitrate (IMDUR) 120 MG extended release tablet Take 1 tablet by mouth daily 4/6/22   ISABELLA Thompson CNP   metoprolol succinate (TOPROL XL) 100 MG extended release tablet Take 1 tablet by mouth daily 3/14/22   Aimra Thorpe MD   mexiletine (MEXITIL) 150 MG capsule TAKE 2 CAPSULES THREE TIMES DAILY FOR ATRIAL FIBRILLATION 2/21/22   Fany Mcallister MD   ONE TOUCH ULTRASOFT LANCETS MISC USE AS DIRECTED TWICE DAILY.  11/23/21   ISABELLA Thompson CNP   doxazosin (CARDURA) 2 MG tablet Take 1 tablet by mouth daily 11/23/21   ISABELLA Thompson CNP   Blood Glucose Monitoring Suppl (ONE TOUCH ULTRA 2) w/Device KIT 1 kit by Does not apply route 2 times daily 11/22/21   ISABELLA Thompson CNP   insulin glargine (LANTUS SOLOSTAR) 100 UNIT/ML injection pen Inject 20 Units into the skin nightly Increase 2 units until morning sugar > 150 11/22/21   ISABELLA Thompson CNP   Insulin Pen Needle 31G X 8 MM MISC 1 each by Does not apply route daily 11/22/21   ISABELLA Thompson CNP   linagliptin (TRADJENTA) 5 MG tablet TAKE 1 TABLET BY MOUTH DAILY 10/6/21   ISABELLA Thompson CNP   potassium chloride (KLOR-CON M) 20 MEQ extended release tablet Take 1 tablet by mouth daily 10/6/21   ISABELLA Thompson - CNP   amiodarone (CORDARONE) 200 MG tablet Take 1 tablet by mouth daily 8/10/21   Bekah Hamilton [place]arshfield, APRN - CNP   losartan (COZAAR) 50 MG tablet TAKE 1 TABLET BY MOUTH DAILY 6/21/21   Reta Gloria DO   bumetanide (BUMEX) 2 MG tablet TAKE 1 TABLET BY MOUTH DAILY 2/8/21   ISABELLA Gallo CNP   nitroGLYCERIN (NITROSTAT) 0.4 MG SL tablet Place 1 tablet under the tongue every 5 minutes as needed for Chest pain X 3 doses. If chest pain continues seek medical attention 3/14/18   ISABELLA Gallo CNP   glucose blood VI test strips (PHIL CONTOUR TEST) strip 1 each by In Vitro route daily 1/5/17   ISABELLA Pierre CNP   acetaminophen 650 MG TABS Take 650 mg by mouth every 4 hours as needed 1/22/16   Shay Turner MD       Allergies:    Latex, Pcn [penicillins], and Zoloft [sertraline hcl]    Social History:    TOBACCO:   reports that he has been smoking cigarettes. He started smoking about 42 years ago. He has been smoking about 0.50 packs per day. His smokeless tobacco use includes snuff. ETOH:   reports previous alcohol use. Family History:        Problem Relation Age of Onset    Diabetes Mother     High Blood Pressure Mother     Stroke Mother     Diabetes Father     Heart Disease Father     High Blood Pressure Father     Heart Disease Sister         CABG    Diabetes Maternal Grandmother     Diabetes Maternal Grandfather     Heart Disease Paternal Grandfather        Objective:   BP (!) 175/99   Pulse 70   Temp 99.1 °F (37.3 °C) (Oral)   Resp 15   Ht 6' 2\" (1.88 m)   Wt 280 lb (127 kg)   SpO2 98%   BMI 35.95 kg/m²   No intake or output data in the 24 hours ending 07/06/22 0651    Physical Exam  Vitals and nursing note reviewed. Constitutional:       General: He is not in acute distress. Appearance: Normal appearance. He is obese. He is not ill-appearing, toxic-appearing or diaphoretic. HENT:      Head: Normocephalic and atraumatic.       Right Ear: External ear normal.      Left Ear: External ear normal.      Nose: Nose normal. No congestion or rhinorrhea. Mouth/Throat:      Mouth: Mucous membranes are moist.      Pharynx: Oropharynx is clear. No oropharyngeal exudate or posterior oropharyngeal erythema. Eyes:      General: No scleral icterus. Right eye: No discharge. Left eye: No discharge. Extraocular Movements: Extraocular movements intact. Conjunctiva/sclera: Conjunctivae normal.      Pupils: Pupils are equal, round, and reactive to light. Neck:      Vascular: No carotid bruit. Cardiovascular:      Rate and Rhythm: Normal rate and regular rhythm. Pulses: Normal pulses. Heart sounds: Normal heart sounds. No murmur heard. No friction rub. No gallop. Pulmonary:      Effort: Pulmonary effort is normal. No respiratory distress. Breath sounds: No stridor. No wheezing, rhonchi or rales. Chest:      Chest wall: No tenderness. Abdominal:      General: Bowel sounds are normal. There is distension. Palpations: Abdomen is soft. There is no mass. Tenderness: There is no abdominal tenderness. There is no right CVA tenderness, left CVA tenderness, guarding or rebound. Hernia: No hernia is present. Musculoskeletal:         General: No swelling, tenderness, deformity or signs of injury. Normal range of motion. Cervical back: Normal range of motion. No rigidity or tenderness. Right lower leg: No edema. Left lower leg: No edema. Lymphadenopathy:      Cervical: No cervical adenopathy. Skin:     General: Skin is warm. Coloration: Skin is not jaundiced or pale. Findings: No bruising, erythema, lesion or rash. Neurological:      General: No focal deficit present. Mental Status: He is alert and oriented to person, place, and time. Mental status is at baseline. Cranial Nerves: No cranial nerve deficit. Sensory: No sensory deficit. Motor: No weakness.       Coordination: Coordination normal.      Gait: Gait normal.      Deep Tendon Reflexes: Reflexes normal.   Psychiatric:         Mood and Affect: Mood normal.         Behavior: Behavior normal.         Thought Content: Thought content normal.         Judgment: Judgment normal.     Medications:   Continuous Infusions:    PRN Meds:    Data:    CBC:   Recent Labs     07/05/22  1630   WBC 6.0   RBC 3.72*   HGB 12.2*   HCT 36.3*   MCV 97.6*   *       BMP:   Recent Labs     07/05/22  1630      K 4.4      CO2 18*   BUN 38*   CREATININE 2.6*       PT/INR:   Recent Labs     07/05/22  1630   INR 3.46*       LIVER PROFILE:   Recent Labs     07/05/22  1630   AST 43*   ALT 34   BILIDIR <0.2   BILITOT 0.4   ALKPHOS 98      Results for Meenakshi Rodriguez (MRN [id number]) as of 7/6/2022 06:27   Ref. Range 7/5/2022 16:30   Hemoglobin A1C Latest Ref Range: 4.4 - 6.4 % 6.8 (H)   TSH Latest Ref Range: 0.400 - 4.200 uIU/mL 2.650     Results for Meenakshi Rodriguez (MRN [id number]) as of 7/6/2022 06:27   Ref. Range 7/5/2022 16:30   Iron Latest Ref Range: 65 - 195 ug/dL 101   INR Latest Ref Range: 0.85 - 1.13  3.46 (H)     ABG. None. URINALYSIS. Results for Meenakshi Rodriguez (MRN [id number]) as of 7/6/2022 06:27   Ref. Range 4/22/2022 15:45   Creatinine, Urine Latest Units: mg/dL 78.0   Microalb/Creat Ratio Latest Ref Range: 0 - 30 mg/g 2715 (H)   Microalbumin, Random Urine Latest Units: mg/dL 211.76       SEROLOGY  Results for Meenakshi Rodriguez (MRN [id number]) as of 7/6/2022 06:27   Ref. Range 9/19/2018 15:55   SRIDEVI SCREEN Latest Ref Range: None Detec  None Detected   Immunofixation Result, Serum Unknown SEE BELOW   KAPPA/LAMBDA QUANT FREE LIGHT CHAINS SERUM Unknown Rpt   C3 Complement Latest Ref Range: 88 - 201 mg/dL 116   IMMUNOFIXATION SERUM PROFILE Unknown Rpt   Hepatitis B Surface Ag Unknown Negative   Hepatitis C Ab Unknown Negative     TUMOR MARKERS. Results for Meenakshi Rodriguez (MRN [id number]) as of 7/6/2022 06:27   Ref.  Range 1/26/2018 12:16 5/2/2018 13:54   CA 19-9 Latest Ref Range: 0 - 35 U/mL 31 32     Results for congestive heart failure in this patient status post previous sternotomy   and pacemaker placement    Echocardiogram-4/20/2022. SUMMARY:   Ejection fraction is visually estimated at 25%. There was severe global hypokinesis of the left ventricle. Left Ventricular size is Moderately increased . Mildly dilated left atrium. No thrombi    Echocardiogram-2/18/2022. SUMMARY:   Technically difficult examination. Left ventricular size is mildly dilated and systolic function is severely  reduced. Ejection fraction was estimated at 25-30%. LV wall thickness is within normal limits. The left atrium is moderately dilated. Device lead seen in the right heart. Echocardiogram-10/19/2020. SUMMARY:   Technically difficult examination. Left ventricle size is normal.    Normal left ventricular wall thickness. There was severe global hypokinesis of the left ventricle. Systolic function was severely reduced. Ejection fraction is visually estimated in the range of 30 % to 35%. The left atrium is Moderately dilated. ASSESSMENT AND  PLAN. 1.CARDIOVASCULAR. CAD with unstable angina d/t cocaine and methamphetamine use-tele, troponin, Imdur,ASA and NTG. Chronic CHF with systolic heart dysfunction -po Bumex. H/o CABG. Ischemic cardiomyopathy with AICD-EF 25%     Uncontrolled hypertension -On Hydralazine and Cardura     Paroxysmal atrial fibrillation w/ CVR on Amiodarone and metoprolol. Dyslipidemia on atorvastatin. 2.RENAL AND ELECTROLYTES. BABAK on stage III CKD     Diabetic nephropathy with proteinuria    3. ENDO. Hyperglycemia due to T 2 DM controlled -sliding scale insulin and lantus. TSH 2.650 and 6 A1c 6.8    4.MUSCULOSKELETAL. H/o neuromuscular disorder. 5.PSYCH. Anxiety and depression. 6. LIFE STYLE. Chronic alcohol abuse-[facility] protocol     Polysubstance abuse-UDS positive for amphetamines and cocaine metabolites. 7.HEM-ONC. Long-term OAC with Coumadin INR 3.46.      8.NUTRITION. Obesity with a BMI of 35.95-needs regular exercise diet control for weight loss management    9. DISPO. Planned d/c to home soon.     Electronically signed by Milli Swift MD on 7/6/2022 at 6:51 AM

## 2022-07-05 NOTE — ED NOTES
Prior to RN assessment, patient resting on cot with eyes closed with respirations even and unlabored. Pt states he is still having 10/10 CP.       Kavon Lane, RN  07/05/22 2411

## 2022-07-06 PROBLEM — I20.0 UNSTABLE ANGINA (HCC): Status: RESOLVED | Noted: 2022-07-05 | Resolved: 2022-07-06

## 2022-07-06 LAB
AVERAGE GLUCOSE: 144 MG/DL (ref 70–126)
GLUCOSE BLD-MCNC: 183 MG/DL (ref 70–108)
GLUCOSE BLD-MCNC: 188 MG/DL (ref 70–108)
GLUCOSE BLD-MCNC: 190 MG/DL (ref 70–108)
GLUCOSE BLD-MCNC: 226 MG/DL (ref 70–108)
GLUCOSE BLD-MCNC: 237 MG/DL (ref 70–108)
HBA1C MFR BLD: 6.8 % (ref 4.4–6.4)
INR BLD: 3.66 (ref 0.85–1.13)
IRON: 101 UG/DL (ref 65–195)
LACTIC ACID: 1 MMOL/L (ref 0.5–2)
TROPONIN T: 0.1 NG/ML
TROPONIN T: 0.12 NG/ML
TSH SERPL DL<=0.05 MIU/L-ACNC: 2.65 UIU/ML (ref 0.4–4.2)

## 2022-07-06 PROCEDURE — 84484 ASSAY OF TROPONIN QUANT: CPT

## 2022-07-06 PROCEDURE — 6370000000 HC RX 637 (ALT 250 FOR IP): Performed by: INTERNAL MEDICINE

## 2022-07-06 PROCEDURE — 99232 SBSQ HOSP IP/OBS MODERATE 35: CPT | Performed by: PHYSICIAN ASSISTANT

## 2022-07-06 PROCEDURE — 83605 ASSAY OF LACTIC ACID: CPT

## 2022-07-06 PROCEDURE — 2140000000 HC CCU INTERMEDIATE R&B

## 2022-07-06 PROCEDURE — 6370000000 HC RX 637 (ALT 250 FOR IP): Performed by: PHARMACIST

## 2022-07-06 PROCEDURE — 85610 PROTHROMBIN TIME: CPT

## 2022-07-06 PROCEDURE — 6360000002 HC RX W HCPCS: Performed by: INTERNAL MEDICINE

## 2022-07-06 PROCEDURE — 82948 REAGENT STRIP/BLOOD GLUCOSE: CPT

## 2022-07-06 PROCEDURE — 2580000003 HC RX 258: Performed by: INTERNAL MEDICINE

## 2022-07-06 PROCEDURE — 36415 COLL VENOUS BLD VENIPUNCTURE: CPT

## 2022-07-06 RX ORDER — INSULIN GLARGINE 100 [IU]/ML
10 INJECTION, SOLUTION SUBCUTANEOUS 2 TIMES DAILY
Status: DISCONTINUED | OUTPATIENT
Start: 2022-07-06 | End: 2022-07-08 | Stop reason: HOSPADM

## 2022-07-06 RX ORDER — WARFARIN SODIUM 1 MG/1
1 TABLET ORAL ONCE
Status: COMPLETED | OUTPATIENT
Start: 2022-07-06 | End: 2022-07-06

## 2022-07-06 RX ADMIN — Medication 100 MG: at 00:34

## 2022-07-06 RX ADMIN — LORAZEPAM 1 MG: 1 TABLET ORAL at 17:59

## 2022-07-06 RX ADMIN — AMIODARONE HYDROCHLORIDE 200 MG: 200 TABLET ORAL at 00:28

## 2022-07-06 RX ADMIN — DOXAZOSIN 2 MG: 2 TABLET ORAL at 09:49

## 2022-07-06 RX ADMIN — SODIUM CHLORIDE, PRESERVATIVE FREE 10 ML: 5 INJECTION INTRAVENOUS at 08:56

## 2022-07-06 RX ADMIN — ISOSORBIDE MONONITRATE 120 MG: 60 TABLET, EXTENDED RELEASE ORAL at 09:49

## 2022-07-06 RX ADMIN — LORAZEPAM 1 MG: 2 INJECTION INTRAMUSCULAR; INTRAVENOUS at 00:34

## 2022-07-06 RX ADMIN — MEXILETINE HYDROCHLORIDE 300 MG: 150 CAPSULE ORAL at 00:29

## 2022-07-06 RX ADMIN — INSULIN LISPRO 2 UNITS: 100 INJECTION, SOLUTION INTRAVENOUS; SUBCUTANEOUS at 12:52

## 2022-07-06 RX ADMIN — MEXILETINE HYDROCHLORIDE 300 MG: 150 CAPSULE ORAL at 06:37

## 2022-07-06 RX ADMIN — ATORVASTATIN CALCIUM 80 MG: 80 TABLET, FILM COATED ORAL at 22:20

## 2022-07-06 RX ADMIN — TICAGRELOR 90 MG: 90 TABLET ORAL at 22:20

## 2022-07-06 RX ADMIN — MEXILETINE HYDROCHLORIDE 300 MG: 150 CAPSULE ORAL at 14:40

## 2022-07-06 RX ADMIN — INSULIN LISPRO 4 UNITS: 100 INJECTION, SOLUTION INTRAVENOUS; SUBCUTANEOUS at 08:56

## 2022-07-06 RX ADMIN — TICAGRELOR 90 MG: 90 TABLET ORAL at 08:56

## 2022-07-06 RX ADMIN — SODIUM CHLORIDE, PRESERVATIVE FREE 10 ML: 5 INJECTION INTRAVENOUS at 22:21

## 2022-07-06 RX ADMIN — HYDRALAZINE HYDROCHLORIDE 100 MG: 50 TABLET, FILM COATED ORAL at 22:20

## 2022-07-06 RX ADMIN — MEXILETINE HYDROCHLORIDE 300 MG: 150 CAPSULE ORAL at 22:20

## 2022-07-06 RX ADMIN — WARFARIN SODIUM 1 MG: 1 TABLET ORAL at 19:11

## 2022-07-06 RX ADMIN — ATORVASTATIN CALCIUM 80 MG: 80 TABLET, FILM COATED ORAL at 00:28

## 2022-07-06 RX ADMIN — LORAZEPAM 2 MG: 2 INJECTION INTRAMUSCULAR; INTRAVENOUS at 03:44

## 2022-07-06 RX ADMIN — SODIUM CHLORIDE, PRESERVATIVE FREE 10 ML: 5 INJECTION INTRAVENOUS at 02:35

## 2022-07-06 RX ADMIN — AMIODARONE HYDROCHLORIDE 200 MG: 200 TABLET ORAL at 09:49

## 2022-07-06 RX ADMIN — HYDRALAZINE HYDROCHLORIDE 100 MG: 50 TABLET, FILM COATED ORAL at 14:40

## 2022-07-06 RX ADMIN — INSULIN LISPRO 2 UNITS: 100 INJECTION, SOLUTION INTRAVENOUS; SUBCUTANEOUS at 17:53

## 2022-07-06 RX ADMIN — INSULIN GLARGINE 10 UNITS: 100 INJECTION, SOLUTION SUBCUTANEOUS at 09:49

## 2022-07-06 RX ADMIN — HYDRALAZINE HYDROCHLORIDE 100 MG: 50 TABLET, FILM COATED ORAL at 06:37

## 2022-07-06 RX ADMIN — BUMETANIDE 2 MG: 1 TABLET ORAL at 09:49

## 2022-07-06 ASSESSMENT — PAIN SCALES - GENERAL
PAINLEVEL_OUTOF10: 0
PAINLEVEL_OUTOF10: 0
PAINLEVEL_OUTOF10: 2

## 2022-07-06 ASSESSMENT — PAIN DESCRIPTION - LOCATION: LOCATION: CHEST

## 2022-07-06 ASSESSMENT — PAIN - FUNCTIONAL ASSESSMENT
PAIN_FUNCTIONAL_ASSESSMENT: NONE - DENIES PAIN
PAIN_FUNCTIONAL_ASSESSMENT: NONE - DENIES PAIN
PAIN_FUNCTIONAL_ASSESSMENT: 0-10

## 2022-07-06 ASSESSMENT — LIFESTYLE VARIABLES: HOW OFTEN DO YOU HAVE A DRINK CONTAINING ALCOHOL: NEVER

## 2022-07-06 NOTE — PROGRESS NOTES
Clinical Pharmacy Note    Danette Melara is a 62 y.o. male for whom pharmacy has been asked to manage warfarin therapy. Reason for Admission: chest pain    Consulting Provider: Dr. 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    Anxiety disorder     Arthritis     Atrial fibrillation ([address]     CAD (coronary artery disease) 3/25/2014    s/p CABG in may 2014    Cardiomyopathy [facility])     Chronic kidney disease     Depression     Diabetes mellitus ([address]     Drug abuse ([facility] [address]     hx of cacaine abuse, stopped abusing drugs in 2012    GERD (gastroesophageal reflux disease)     H/O cardiac catheterization 4/30/2014    Hyperlipidemia     Hypertension     Intradural extramedullary spinal tumor     Lumbar spine tumor     Medtronic dual icd      Neuromuscular disorder ([address]     Other disorders of kidney and ureter in diseases classified elsewhere     Paroxysmal atrial fibrillation ([address] 7/22/2014    V tach ([address]     V-tach ([facility] [address]     s/p ablation in dec 2014              Recent Labs     07/05/22  1630   INR 3.46*     Recent Labs     07/05/22  1630   HGB 12.2*   HCT 36.3*   *     Current warfarin drug-drug interactions: amiodarone, ticagrelor    Date INR Warfarin Dose   7/5/22 3.46 Hold                                   PT/INR or POC-INR will be monitored routinely until therapeutic INR is achieved.     Thank you for the consult,

## 2022-07-06 NOTE — ED PROVIDER NOTES
[facility] EMERGENCY DEPT      CHIEF COMPLAINT       Chief Complaint   Patient presents with    Chest Pain    Alcohol Intoxication       Nurses Notes reviewed and I agree except as noted in the HPI. HISTORY OF PRESENT ILLNESS    Kalani Tate is a 62 y.o. male who presents for chest pain. Patient developed left-sided chest pain this morning while drinking beer. Associated symptoms include dyspnea. Patient reports he has not experienced this type of pain previously. Patient denies diaphoresis, nausea, dizziness, or other complaints. Patient admits to drinking 1/5 of whiskey yesterday and snorting cocaine. He has a history of CABG, stents, atrial fibrillation, and pacemaker and sees Dr. Emani Lopez. Patient is tearful and irritated by questions. Location/Symptom: Left-sided chest pain  Timing/Onset: This morning  Context/Setting: Occurred while drinking beer  Quality: Patient unable to describe  Duration: Constant  Modifying Factors: Patient will not answer this question  Severity: Moderate    REVIEW OF SYSTEMS     Review of Systems   Constitutional: Negative for chills, diaphoresis and fever. HENT: Negative for congestion, rhinorrhea and sore throat. Eyes: Negative for photophobia. Respiratory: Positive for shortness of breath. Negative for cough. Cardiovascular: Positive for chest pain. Gastrointestinal: Negative for abdominal pain, diarrhea, nausea and vomiting. Genitourinary: Negative for difficulty urinating. Musculoskeletal: Negative for gait problem. Skin: Negative for rash. Neurological: Negative for dizziness, weakness and light-headedness. Psychiatric/Behavioral: Negative for confusion. The patient is nervous/anxious.          PAST MEDICAL HISTORY    has a past medical history of Acute systolic CHF (congestive heart failure) ([address] Alcohol abuse, Anomalous origin of right coronary artery, Anxiety disorder, Arthritis, Atrial fibrillation ([address] CAD (coronary artery disease), Cardiomyopathy ([facility] Chronic kidney disease, Depression, Diabetes mellitus ([facility] Drug abuse ([facility] GERD (gastroesophageal reflux disease), H/O cardiac catheterization, Hyperlipidemia, Hypertension, Intradural extramedullary spinal tumor, Lumbar spine tumor, Medtronic dual icd , Neuromuscular disorder ([facility] Other disorders of kidney and ureter in diseases classified elsewhere, Paroxysmal atrial fibrillation ([facility] V tach ([facility] and V-tach ([facility] SURGICAL HISTORY      has a past surgical history that includes Cardiac catheterization (3/20/14 ); Coronary artery bypass graft (5-9-14); other surgical history (Left, 10/21/14); EKG 12 Lead (7/17/2015); Cardiac defibrillator placement (10/13/2015); vascular surgery; pacemaker placement; and pr laminectomy,>2 sgmt,lumbar (N/A, 1/16/2018).     CURRENT MEDICATIONS       Previous Medications    ACETAMINOPHEN 650 MG TABS    Take 650 mg by mouth every 4 hours as needed    ALPRAZOLAM (XANAX) 0.5 MG TABLET    TAKE 1 TABLET BY MOUTH AT BEDTIME FOR ANXIETY    AMIODARONE (CORDARONE) 200 MG TABLET    Take 1 tablet by mouth daily    ATORVASTATIN (LIPITOR) 80 MG TABLET    TAKE 1 TABLET BY MOUTH EVERY NIGHT    BLOOD GLUCOSE MONITORING SUPPL (ONE TOUCH ULTRA 2) W/DEVICE KIT    1 kit by Does not apply route 2 times daily    BLOOD GLUCOSE TEST STRIPS (ONETOUCH ULTRA) STRIP    USE AS DIRECTED TWICE DAILY    BRILINTA 90 MG TABS TABLET    TAKE 1 TABLET BY MOUTH TWICE DAILY    BUMETANIDE (BUMEX) 2 MG TABLET    TAKE 1 TABLET BY MOUTH DAILY    DOXAZOSIN (CARDURA) 2 MG TABLET    Take 1 tablet by mouth daily    GLUCOSE BLOOD VI TEST STRIPS (PHIL CONTOUR TEST) STRIP    1 each by In Vitro route daily    HYDRALAZINE (APRESOLINE) 100 MG TABLET    TAKE 1 TABLET BY MOUTH THREE TIMES DAILY    INSULIN GLARGINE (LANTUS SOLOSTAR) 100 UNIT/ML INJECTION PEN    Inject 20 Units into the skin nightly Increase 2 units until morning sugar > 150    INSULIN PEN NEEDLE 31G X 8 MM MISC    1 each by Does not apply route daily    ISOSORBIDE MONONITRATE (IMDUR) 120 MG EXTENDED RELEASE TABLET    Take 1 tablet by mouth daily    LINAGLIPTIN (TRADJENTA) 5 MG TABLET    TAKE 1 TABLET BY MOUTH DAILY    LOSARTAN (COZAAR) 50 MG TABLET    TAKE 1 TABLET BY MOUTH DAILY    METOPROLOL SUCCINATE (TOPROL XL) 100 MG EXTENDED RELEASE TABLET    Take 1 tablet by mouth daily    MEXILETINE (MEXITIL) 150 MG CAPSULE    TAKE 2 CAPSULES THREE TIMES DAILY FOR ATRIAL FIBRILLATION    NITROGLYCERIN (NITROSTAT) 0.4 MG SL TABLET    Place 1 tablet under the tongue every 5 minutes as needed for Chest pain X 3 doses. If chest pain continues seek medical attention    ONE TOUCH ULTRASOFT LANCETS MISC    USE AS DIRECTED TWICE DAILY. POTASSIUM CHLORIDE (KLOR-CON M) 20 MEQ EXTENDED RELEASE TABLET    Take 1 tablet by mouth daily    WARFARIN (COUMADIN) 5 MG TABLET    USE AS DIRECTED BY COUMADIN CLINIC       ALLERGIES     is allergic to latex, pcn [penicillins], and zoloft [sertraline hcl]. FAMILY HISTORY     He indicated that his mother is . He indicated that his father is . He indicated that his sister is alive. He indicated that the status of his maternal grandmother is unknown. He indicated that the status of his maternal grandfather is unknown. He indicated that the status of his paternal grandfather is unknown.   family history includes Diabetes in his father, maternal grandfather, maternal grandmother, and mother; Heart Disease in his father, paternal grandfather, and sister; High Blood Pressure in his father and mother; Stroke in his mother. SOCIAL HISTORY    reports that he has been smoking cigarettes. He started smoking about 41 years ago. He has been smoking about 0.50 packs per day. His smokeless tobacco use includes snuff. He reports previous alcohol use. He reports current drug use. Drug: Cocaine. PHYSICAL EXAM     INITIAL VITALS:  height is 6' 2\" (1.88 m) and weight is 280 lb (127 kg). His oral temperature is 99.1 °F (37.3 °C). His blood pressure is 180/96 (abnormal) and his pulse is 68. His respiration is 18 and oxygen saturation is 100%. Physical Exam  Constitutional:       Appearance: Normal appearance. He is well-developed. He is not ill-appearing or diaphoretic. HENT:      Head: Normocephalic and atraumatic. Right Ear: Hearing normal.      Left Ear: Hearing normal.      Nose: Nose normal. No rhinorrhea. Mouth/Throat:      Lips: Pink. Mouth: Mucous membranes are moist.      Pharynx: Oropharynx is clear. Eyes:      General: Lids are normal. No scleral icterus. Extraocular Movements: Extraocular movements intact. Conjunctiva/sclera: Conjunctivae normal.      Pupils: Pupils are equal, round, and reactive to light. Neck:      Trachea: Trachea normal.   Cardiovascular:      Rate and Rhythm: Normal rate and regular rhythm. Heart sounds: Normal heart sounds. No murmur heard. Pulmonary:      Effort: Pulmonary effort is normal.      Breath sounds: Normal breath sounds and air entry. No decreased breath sounds, wheezing or rhonchi. Abdominal:      General: There is no distension. Palpations: Abdomen is soft. Tenderness: There is no abdominal tenderness. Musculoskeletal:      Cervical back: Normal range of motion and neck supple. No rigidity. Comments: Well perfused; movement normal as observed; no signs of DVT   Lymphadenopathy:      Cervical: No cervical adenopathy. Skin:     General: Skin is warm and dry. Findings: Ecchymosis present. No rash. Neurological:      General: No focal deficit present. Mental Status: He is alert. Sensory: Sensation is intact. Motor: Motor function is intact. Gait: Gait is intact. Psychiatric:         Mood and Affect: Mood is anxious. Affect is labile and tearful. Speech: Speech normal.         Behavior: Behavior is agitated.          DIFFERENTIAL DIAGNOSIS:   Including but not limited to: ACS, Prinzmetal's angina, gastritis, substance abuse, intoxication    DIAGNOSTIC RESULTS     EKG: All [facility]'s [facility] who either signs or Co-signs this chart in the absence of a cardiologist.  Ventricular rate 70 bpm  DC interval 192 ms  QRS duration 86 ms  QTc interval 440 ms  P-R-T axes 17, -7, 84  Normal sinus rhythm. No STEMI    RADIOLOGY: non-plain film images(s) such as CT,Ultrasound and MRI are read by the radiologist.  Plain radiographic images are visualized and preliminarily interpreted by the emergency physician unless otherwise stated below. XR CHEST PORTABLE   Final Result   1. Mild cardiomegaly and probable congestive heart failure in this patient status post previous sternotomy and pacemaker placement         **This report has been created using voice recognition software. It may contain minor errors which are inherent in voice recognition technology. **      Final report electronically signed by DR Shellye Ahumada on 7/5/2022 4:51 PM          LABS:   Labs Reviewed   BASIC METABOLIC PANEL W/ REFLEX TO MG FOR LOW K - Abnormal; Notable for the following components:       Result Value    CO2 18 (*)     Glucose 123 (*)     BUN 38 (*)     CREATININE 2.6 (*)     All other components within normal limits   BRAIN NATRIURETIC PEPTIDE - Abnormal; Notable for the following components:    Pro-BNP 4272.0 (*)     All other components within normal limits   CBC WITH AUTO DIFFERENTIAL - Abnormal; Notable for the following components:    RBC 3.72 (*)     Hemoglobin 12.2 (*)     Hematocrit 36.3 (*)     MCV 97.6 (*)     RDW-SD 48.2 (*)     Platelets 600 (*)     Lymphocytes Absolute 0.5 (*)     All other components within normal limits   TROPONIN - Abnormal; Notable for the following components:    Troponin T 0.121 (*)     All other components within normal limits   HEPATIC FUNCTION PANEL - Abnormal; Notable for the following components:    AST 43 (*)     Total Protein 5.8 (*)     All other components within normal limits   PROTIME-INR - Abnormal; Notable for the following components:    INR 3.46 (*)     All other components within normal limits   GLOMERULAR FILTRATION RATE, ESTIMATED - Abnormal; Notable for the following components:    Est, Glom Filt Rate 26 (*)     All other components within normal limits   MAGNESIUM - Abnormal; Notable for the following components:    Magnesium 2.5 (*)     All other components within normal limits   ETHANOL   ANION GAP   OSMOLALITY   URINE DRUG SCREEN   LACTIC ACID   TSH WITH REFLEX   HEMOGLOBIN A1C   IRON       EMERGENCY DEPARTMENT COURSE:   Vitals:    Vitals:    07/05/22 1823 07/05/22 1918 07/05/22 2019 07/05/22 2023   BP: (!) 181/86 (!) 174/94 (!) 180/96 (!) 180/96   Pulse: 62 63 68 68   Resp: 22 20 18    Temp:   99.1 °F (37.3 °C)    TempSrc:   Oral    SpO2: 100% 100% 100%    Weight:       Height:           Heart Score for chest pain patients  History: Moderately Suspicious  ECG: Normal  Patient Age: > 39 and < 65 years  *Risk factors for Atherosclerotic disease: Diabetes Mellitus,Obesity,Hypertension,Coronary Artery Disease,Cocaine abuse  Risk Factors: > 3 Risk factors or history of atherosclerotic disease*  Troponin: > 1 and < 3X normal limit  Heart Score Total: 5    MDM:  The patient was seen by me in the emergency room for chest pain. Physical exam revealed a tearful 70-year-old gentleman who was irritated by questions. He sometimes became angry and hollered. Vital signs reviewed and noted stable. Old records were reviewed. Appropriate laboratory and imaging studies were ordered and reviewed upon completion. Pertinent findings: Creatinine 2.6, BNP 4272, troponin 0.121, INR 3.46; chest x-ray shows probable congestive heart failure    Interventions: Ativan, morphine, Zofran. Aspirin was not given as patient was on Coumadin and had a therapeutic INR. Reexamination: Patient has remained stable with good vital signs.   Ativan proved beneficial to calm the patient down and morphine helped decrease patient's chest pain. Results were discussed with the patient as well as desire for admission and they were amenable. Dr. Binh Celis of our hospitalists' service was consulted and graciously accepted admission. The patient was admitted to the hospital in fair condition. CRITICAL CARE:   During my workup of this patient, it did become clear to me the critical nature of this patient's illness which did require my constant attention, and a critical care time 30 minutes was given    CONSULTS:  1928: Dr. Binh Celis (hospitalist)    PROCEDURES:  None    FINAL IMPRESSION      1. Chest pain, unspecified type    2. Elevated troponin    3. Cocaine abuse ([facility]    4. Alcohol abuse    5. Hx of coronary artery disease    6. Chronic kidney disease, unspecified CKD stage    7. Acute on chronic congestive heart failure, unspecified heart failure type ([facility]          DISPOSITION/PLAN     1. Chest pain, unspecified type    2. Elevated troponin    3. Cocaine abuse ([facility]    4. Alcohol abuse    5. Hx of coronary artery disease    6. Chronic kidney disease, unspecified CKD stage    7.  Acute on chronic congestive heart failure, unspecified heart failure type [facility])    Admission        (Please note that portions of this note were completed with a voice recognition program.  Efforts were made to edit the dictations but occasionally words are mis-transcribed.)    Kathlean Landau, PA-C 07/05/22 9:15 PM    Kathlean Landau, PA-C Kathlean Landau, PA-C  07/05/22 [id number]       Kathlean Landau, PA-C  07/06/22 [id number]

## 2022-07-06 NOTE — ED NOTES
Prior to RN assessment, patient was resting on cot with eyes closed while respirations even and unlabored with no obvious tremors. RN walked into room and patient began to display tremors.       Christian Vasquez, ALETA  07/05/22 1368

## 2022-07-06 NOTE — PROGRESS NOTES
Hospitalist Progress Note      Patient:  Kalani Tate    Unit/Bed:4B-04/004-A  YOB: 1965  MRN: [id number]   Acct: [de-identified]   PCP: ISABELLA Cevallos CNP  Date of Admission: 7/5/2022    Assessment/Plan:    1. Unstable Angina with significant CAD history: s/p CABG. Cardiology consulted. Pt admits to cocaine on 4th of July. Elevated troponin 0.121, 0.099. Continue Brillinta, Statin, Imdur, OAC. 2. Polysubstance abuse: UDS shows positive amphetamine, cocaine, opiates. Patient adamantly denies meth use but states that he did cocaine on 4 July and regrets it. MAAME consulted. 3. Ischemic Cardiomyopathy, HFrEF: Latest echo shows ejection fraction 25%. Watch fluid status. Status post AICD. Continue home medications. 4. CKD, stage 3: Pt follows with Dr. Ray Hanley, he saw him recently (April 2022) and believes this is progression of disease. Creatinine in April was 2.5.   5. Atrial fibrillation, paroxysmal: Continue amiodarone and beta-blocker. Currently rate controlled. 6. HLD: Statin. Chief Complaint: Chest Pain     Initial H and P:-    Initial H&P \"62year-old male patient with present to the ED today with 1 day h/o left-sided 8/10 nonradiating chest pain. Chest pain is now 4/10 intensity at the time of interview. Mild shortness of breath, no palpitation, no dizziness,   no diaphoresis.       He has a history of CAD,CABG and polysubstance abuse.       Patient used cocaine in the last 24 hours. Active chronic drinker -drinks whiskey and beers. Last drink was this afternoon at 3 PM.     Patient feels anxious at the time of interview.     Initial vital signs in the ED temperature 37.0 °C, respiratory 22, heart rate 66, blood pressure 147/76, oxygen saturation 99% on room air.     Initial labs in the ED serum sodium 136, potassium 4.4, chloride 105, CO2 is 18, BUN 38, creatinine 2.6  From 2.5 on 4/22/2022.   Blood sugar 123, calcium 8.5, serum osmolality 282.4, total protein 5.8, proBNP 4272, troponin infested 0.121, albumin 3.6, alkaline phosphate 98, ALT 34, AST 43, total bilirubin 0.4, serum alcohol level 0.09.     WBC 6.0, hemoglobin 12.2, MCV 97.6, platelets 669, INR 9.94.     A twelve-lead EKG which reviewed shows normal sinus rhythm at 70 bpm.  QTC of 440 ms. No ST segment elevation or depression.     Chest x-ray which I reviewed shows moderate cardiomegaly with mild pulmonary congestion. AICD in situ     ED treatment included IV morphine 2 mg x 1 dose, IV Ativan 1 mg, IV Zofran 4 mg x 1 dose. \"    Subjective (past 24 hours):   Pt was admitted last evening. Pt denies CP, SOB. Pt states that he used cocaine on 4th of July and regrets it. Pt and this provider had a long conversation about sobriety and how sick his heart is. Past medical history, family history, social history and allergies reviewed again and is unchanged since admission. ROS (All review of systems completed. Pertinent positives noted.  Otherwise All other systems reviewed and negative.)     Medications:  Reviewed    Infusion Medications    sodium chloride       Scheduled Medications    insulin glargine  10 Units SubCUTAneous BID    warfarin  1 mg Oral Once    sodium chloride flush  10 mL IntraVENous 2 times per day    nicotine  1 patch TransDERmal Daily    thiamine  100 mg Oral Daily    insulin lispro  0-12 Units SubCUTAneous TID WC    insulin lispro  0-6 Units SubCUTAneous Nightly    amiodarone  200 mg Oral Daily    atorvastatin  80 mg Oral Daily    ticagrelor  90 mg Oral BID    bumetanide  2 mg Oral Daily    doxazosin  2 mg Oral Daily    hydrALAZINE  100 mg Oral 3 times per day    isosorbide mononitrate  120 mg Oral Daily    warfarin placeholder: dosing by pharmacy   Other RX Placeholder    mexiletine  300 mg Oral 3 times per day     PRN Meds: sodium chloride flush, sodium chloride, ondansetron **OR** ondansetron, polyethylene glycol, hours.    Microbiology:    Blood culture #1:   Lab Results   Component Value Date/Time    BC No growth-preliminary  No growth   02/03/2019 07:01 AM       Blood culture #2:No results found for: Kee Terrell    Organism:  Lab Results   Component Value Date/Time    ORG Escherichia coli 05/01/2018 04:25 PM         Lab Results   Component Value Date/Time    LABGRAM  06/30/2017 01:10 PM     No segmented neutrophils observed. No epithelial cells observed. No bacteria seen. MRSA culture only:No results found for: [facility]    Urine culture:   Lab Results   Component Value Date/Time    LABURIN  12/20/2017 01:15 PM     Mixed growth. The mixture of organisms present represents  both organisms that may cause urinary tract infections and  organisms that are not a common cause of urinary tract  infections and are possibly skin titus or distal urethral  titus. Respiratory culture: No results found for: CULTRESP    Aerobic and Anaerobic :  Lab Results   Component Value Date/Time    LABAERO No growth-preliminary  No growth   06/30/2017 01:10 PM     Lab Results   Component Value Date/Time    LABANAE No growth-preliminary  No growth   06/30/2017 01:10 PM       Urinalysis:      Lab Results   Component Value Date/Time    NITRU NEGATIVE 04/22/2022 03:45 PM    WBCUA 0-2 04/22/2022 03:45 PM    BACTERIA NONE SEEN 04/22/2022 03:45 PM    RBCUA 5-10 04/22/2022 03:45 PM    BLOODU NEGATIVE 04/22/2022 03:45 PM    SPECGRAV 1.016 04/22/2022 03:45 PM    GLUCOSEU NEGATIVE 09/24/2019 01:00 PM       Radiology:  XR CHEST PORTABLE   Final Result   1. Mild cardiomegaly and probable congestive heart failure in this patient status post previous sternotomy and pacemaker placement         **This report has been created using voice recognition software. It may contain minor errors which are inherent in voice recognition technology. **      Final report electronically signed by DR Alina Banks on 7/5/2022 4:51 PM          Electronically signed by

## 2022-07-06 NOTE — PROGRESS NOTES
Clinical Pharmacy Note    Warfarin consult follow-up    Recent Labs     07/06/22  0723   INR 3.66*     Recent Labs     07/05/22  1630   HGB 12.2*   HCT 36.3*   *     Significant Drug-Drug Interactions:  New warfarin drug-drug interactions: none  Discontinued drug-drug interactions: none  Current warfarin drug-drug interactions: amiodarone (HM), ticagrelor     Date INR Warfarin Dose   7/5/22 3.46 Hold   7/6/2022  3.66 1mg                 Notes:                   PT/INR or POC-INR will be monitored routinely until therapeutic INR is achieved. Yoly Peraza [facility]. D., BCPS, BCCCP 7/6/2022 1:42 PM

## 2022-07-06 NOTE — ED NOTES
Pt resting on cot with eyes closed with respirations even and unlabored.       Serena Mask, ALETA  07/05/22 [id number]

## 2022-07-06 NOTE — PROGRESS NOTES
SBIRT screening completed per AOD consult. Jovanibrian Lee SBIRT Findings are Negative. Patient denies alcohol and/or drug use. Also denies depressive symptoms.     Brief Intervention and Referral to Treatment Summary N/A    Patient was provided PHQ-9, AUDIT-C and DAST Screening:      PHQ-9 Score:  Negative  AUDIT-C Score:  Negative  DAST Score:   Negative    Patients substance use is considered-Negative    Low Risk/Healthy  Moderate Risk  Harmful  Dependent    Patients depression is considered:-Negative    Minimal  Mild   Moderate  Moderately Severe  Severe    Brief Education Was Provided N/A    Patient was receptive  Patient was not receptive      Brief Intervention Is Provided (Only for AUDIT-C or DAST) N/A    Patient reports readiness to decrease and/or stop use and a plan was discussed   Patient denies readiness to decrease and/or stop use and a plan was not discussed      Recommendations/Referrals for Brief and/or Specialized Treatment Provided to Patient  N/A

## 2022-07-06 NOTE — PROGRESS NOTES
Attempted SBIRT per AOD consult. Pt sleeping. Attempted numerous times to engage pt but pt unable to stay awake. Will re-attempt.

## 2022-07-06 NOTE — ED NOTES
Pt resting on cot with eyes closed with respirations even and unlabored. No tremors noted.       Mitzi Harvey RN  07/05/22 [id number]

## 2022-07-06 NOTE — ED NOTES
Upon first contact with patient this RN receives bedside shift report Renata Moss RN. Patient resting in cot with lights off. Call light in reach. VSS.       Chiquita Cancel ALETA Contreras  07/06/22 7645

## 2022-07-07 PROBLEM — I10 ESSENTIAL HYPERTENSION: Status: ACTIVE | Noted: 2020-11-03

## 2022-07-07 PROBLEM — N18.9 CHRONIC KIDNEY DISEASE: Status: ACTIVE | Noted: 2017-12-20

## 2022-07-07 LAB
ALBUMIN SERPL-MCNC: 3.2 G/DL (ref 3.5–5.1)
ALP BLD-CCNC: 98 U/L (ref 38–126)
ALT SERPL-CCNC: 42 U/L (ref 11–66)
ANION GAP SERPL CALCULATED.3IONS-SCNC: 12 MEQ/L (ref 8–16)
AST SERPL-CCNC: 57 U/L (ref 5–40)
BASOPHILS # BLD: 0.6 %
BASOPHILS ABSOLUTE: 0 THOU/MM3 (ref 0–0.1)
BILIRUB SERPL-MCNC: 0.6 MG/DL (ref 0.3–1.2)
BUN BLDV-MCNC: 43 MG/DL (ref 7–22)
CALCIUM SERPL-MCNC: 8.4 MG/DL (ref 8.5–10.5)
CHLORIDE BLD-SCNC: 106 MEQ/L (ref 98–111)
CO2: 22 MEQ/L (ref 23–33)
CREAT SERPL-MCNC: 2.9 MG/DL (ref 0.4–1.2)
EOSINOPHIL # BLD: 2 %
EOSINOPHILS ABSOLUTE: 0.1 THOU/MM3 (ref 0–0.4)
ERYTHROCYTE [DISTWIDTH] IN BLOOD BY AUTOMATED COUNT: 13.4 % (ref 11.5–14.5)
ERYTHROCYTE [DISTWIDTH] IN BLOOD BY AUTOMATED COUNT: 50.1 FL (ref 35–45)
GFR SERPL CREATININE-BSD FRML MDRD: 23 ML/MIN/1.73M2
GLUCOSE BLD-MCNC: 133 MG/DL (ref 70–108)
GLUCOSE BLD-MCNC: 147 MG/DL (ref 70–108)
GLUCOSE BLD-MCNC: 173 MG/DL (ref 70–108)
GLUCOSE BLD-MCNC: 182 MG/DL (ref 70–108)
GLUCOSE BLD-MCNC: 319 MG/DL (ref 70–108)
HCT VFR BLD CALC: 35 % (ref 42–52)
HEMOGLOBIN: 11.6 GM/DL (ref 14–18)
IMMATURE GRANS (ABS): 0.01 THOU/MM3 (ref 0–0.07)
IMMATURE GRANULOCYTES: 0.2 %
INR BLD: 1.98 (ref 0.85–1.13)
LYMPHOCYTES # BLD: 10 %
LYMPHOCYTES ABSOLUTE: 0.5 THOU/MM3 (ref 1–4.8)
MAGNESIUM: 2.3 MG/DL (ref 1.6–2.4)
MCH RBC QN AUTO: 33.3 PG (ref 26–33)
MCHC RBC AUTO-ENTMCNC: 33.1 GM/DL (ref 32.2–35.5)
MCV RBC AUTO: 100.6 FL (ref 80–94)
MONOCYTES # BLD: 13.4 %
MONOCYTES ABSOLUTE: 0.7 THOU/MM3 (ref 0.4–1.3)
NUCLEATED RED BLOOD CELLS: 0 /100 WBC
PLATELET # BLD: 104 THOU/MM3 (ref 130–400)
PMV BLD AUTO: 10.5 FL (ref 9.4–12.4)
POTASSIUM REFLEX MAGNESIUM: 4.1 MEQ/L (ref 3.5–5.2)
RBC # BLD: 3.48 MILL/MM3 (ref 4.7–6.1)
SEG NEUTROPHILS: 73.8 %
SEGMENTED NEUTROPHILS ABSOLUTE COUNT: 3.8 THOU/MM3 (ref 1.8–7.7)
SODIUM BLD-SCNC: 140 MEQ/L (ref 135–145)
TOTAL PROTEIN: 5.8 G/DL (ref 6.1–8)
WBC # BLD: 5.1 THOU/MM3 (ref 4.8–10.8)

## 2022-07-07 PROCEDURE — 99233 SBSQ HOSP IP/OBS HIGH 50: CPT | Performed by: FAMILY MEDICINE

## 2022-07-07 PROCEDURE — 6370000000 HC RX 637 (ALT 250 FOR IP): Performed by: INTERNAL MEDICINE

## 2022-07-07 PROCEDURE — 82948 REAGENT STRIP/BLOOD GLUCOSE: CPT

## 2022-07-07 PROCEDURE — 85610 PROTHROMBIN TIME: CPT

## 2022-07-07 PROCEDURE — 36415 COLL VENOUS BLD VENIPUNCTURE: CPT

## 2022-07-07 PROCEDURE — 2140000000 HC CCU INTERMEDIATE R&B

## 2022-07-07 PROCEDURE — 85025 COMPLETE CBC W/AUTO DIFF WBC: CPT

## 2022-07-07 PROCEDURE — 99223 1ST HOSP IP/OBS HIGH 75: CPT | Performed by: INTERNAL MEDICINE

## 2022-07-07 PROCEDURE — 6370000000 HC RX 637 (ALT 250 FOR IP)

## 2022-07-07 PROCEDURE — 80053 COMPREHEN METABOLIC PANEL: CPT

## 2022-07-07 PROCEDURE — 6370000000 HC RX 637 (ALT 250 FOR IP): Performed by: STUDENT IN AN ORGANIZED HEALTH CARE EDUCATION/TRAINING PROGRAM

## 2022-07-07 PROCEDURE — 99222 1ST HOSP IP/OBS MODERATE 55: CPT | Performed by: NUCLEAR MEDICINE

## 2022-07-07 PROCEDURE — 2580000003 HC RX 258: Performed by: INTERNAL MEDICINE

## 2022-07-07 PROCEDURE — 83735 ASSAY OF MAGNESIUM: CPT

## 2022-07-07 RX ORDER — POLYETHYLENE GLYCOL 3350 17 G
4 POWDER IN PACKET (EA) ORAL 3 TIMES DAILY
Status: DISCONTINUED | OUTPATIENT
Start: 2022-07-07 | End: 2022-07-08 | Stop reason: HOSPADM

## 2022-07-07 RX ORDER — ALPRAZOLAM 0.5 MG/1
0.5 TABLET ORAL ONCE
Status: DISCONTINUED | OUTPATIENT
Start: 2022-07-07 | End: 2022-07-07

## 2022-07-07 RX ORDER — ALPRAZOLAM 0.5 MG/1
0.5 TABLET ORAL ONCE
Status: COMPLETED | OUTPATIENT
Start: 2022-07-07 | End: 2022-07-07

## 2022-07-07 RX ORDER — WARFARIN SODIUM 2.5 MG/1
2.5 TABLET ORAL
Status: COMPLETED | OUTPATIENT
Start: 2022-07-07 | End: 2022-07-07

## 2022-07-07 RX ADMIN — MEXILETINE HYDROCHLORIDE 300 MG: 150 CAPSULE ORAL at 04:38

## 2022-07-07 RX ADMIN — Medication 100 MG: at 08:01

## 2022-07-07 RX ADMIN — LORAZEPAM 1 MG: 1 TABLET ORAL at 23:14

## 2022-07-07 RX ADMIN — SODIUM CHLORIDE, PRESERVATIVE FREE 10 ML: 5 INJECTION INTRAVENOUS at 19:24

## 2022-07-07 RX ADMIN — ATORVASTATIN CALCIUM 80 MG: 80 TABLET, FILM COATED ORAL at 19:26

## 2022-07-07 RX ADMIN — LORAZEPAM 1 MG: 1 TABLET ORAL at 00:09

## 2022-07-07 RX ADMIN — AMIODARONE HYDROCHLORIDE 200 MG: 200 TABLET ORAL at 08:01

## 2022-07-07 RX ADMIN — HYDRALAZINE HYDROCHLORIDE 100 MG: 50 TABLET, FILM COATED ORAL at 13:09

## 2022-07-07 RX ADMIN — DOXAZOSIN 2 MG: 2 TABLET ORAL at 08:00

## 2022-07-07 RX ADMIN — MEXILETINE HYDROCHLORIDE 300 MG: 150 CAPSULE ORAL at 13:09

## 2022-07-07 RX ADMIN — HYDRALAZINE HYDROCHLORIDE 100 MG: 50 TABLET, FILM COATED ORAL at 23:11

## 2022-07-07 RX ADMIN — ISOSORBIDE MONONITRATE 120 MG: 60 TABLET, EXTENDED RELEASE ORAL at 08:00

## 2022-07-07 RX ADMIN — NICOTINE POLACRILEX 4 MG: 2 LOZENGE ORAL at 19:27

## 2022-07-07 RX ADMIN — INSULIN LISPRO 2 UNITS: 100 INJECTION, SOLUTION INTRAVENOUS; SUBCUTANEOUS at 17:39

## 2022-07-07 RX ADMIN — INSULIN LISPRO 8 UNITS: 100 INJECTION, SOLUTION INTRAVENOUS; SUBCUTANEOUS at 11:54

## 2022-07-07 RX ADMIN — BUMETANIDE 2 MG: 1 TABLET ORAL at 08:01

## 2022-07-07 RX ADMIN — HYDRALAZINE HYDROCHLORIDE 100 MG: 50 TABLET, FILM COATED ORAL at 04:38

## 2022-07-07 RX ADMIN — WARFARIN SODIUM 2.5 MG: 2.5 TABLET ORAL at 17:39

## 2022-07-07 RX ADMIN — INSULIN GLARGINE 10 UNITS: 100 INJECTION, SOLUTION SUBCUTANEOUS at 07:59

## 2022-07-07 RX ADMIN — NICOTINE POLACRILEX 4 MG: 2 LOZENGE ORAL at 11:54

## 2022-07-07 RX ADMIN — INSULIN GLARGINE 10 UNITS: 100 INJECTION, SOLUTION SUBCUTANEOUS at 20:38

## 2022-07-07 RX ADMIN — MEXILETINE HYDROCHLORIDE 300 MG: 150 CAPSULE ORAL at 23:11

## 2022-07-07 RX ADMIN — TICAGRELOR 90 MG: 90 TABLET ORAL at 19:27

## 2022-07-07 RX ADMIN — ALPRAZOLAM 0.5 MG: 0.5 TABLET ORAL at 18:41

## 2022-07-07 RX ADMIN — TICAGRELOR 90 MG: 90 TABLET ORAL at 08:00

## 2022-07-07 ASSESSMENT — ENCOUNTER SYMPTOMS
ABDOMINAL PAIN: 0
NAUSEA: 0
BACK PAIN: 0
VOMITING: 0
EYES NEGATIVE: 1
EYE PAIN: 0
COUGH: 0
DIARRHEA: 0
SHORTNESS OF BREATH: 1

## 2022-07-07 ASSESSMENT — PAIN SCALES - GENERAL
PAINLEVEL_OUTOF10: 0
PAINLEVEL_OUTOF10: 0

## 2022-07-07 NOTE — FLOWSHEET NOTE
Pt was alone at the time of the visit. He was dealing with coronary artery disease involving native coronary artery of native heart with unstable angina pectoris. Prayer was appreciated. 07/07/22 1443   Encounter Summary   Encounter Overview/Reason  Initial Encounter   Service Provided For: Patient   Referral/Consult From: Jasmine   Last Encounter  07/07/22   Complexity of Encounter Low   Begin Time 0930   End Time  0940   Total Time Calculated 10 min   Spiritual/Emotional needs   Type Spiritual Support   Assessment/Intervention/Outcome   Assessment Calm; Hopeful   Intervention Active listening;Empowerment   Outcome Acceptance;Encouraged

## 2022-07-07 NOTE — CONSULTS
[facility]          [facility]        [address]        [place], [place] [facility]        [phone]           Inpatient Initial consult note         7/7/2022 7:36 PM    Patient Name:   Woody Shall:    1965  Primary Care Physician:  ISABELLA Khan CNP     History Obtained From:  patient     Consultation requested by : Donnie Madrigal MD    requested for  : Evaluation of  worsening renal function     History of presentingillness   Dwain Warren is a 62 y.o.   male with Past Medical History as stated below presented with a chief complaint of Chest Pain and Alcohol Intoxication   on 7/5/2022 . Patient presented with chief complaints of chest pain which started 1 day prior to admission, left-sided, 8/10 nonradiating associated with mild shortness of breath no fever chills no cough no dizziness. No specific modifying factors. Patient says he has been feeling very anxious and he has been drinking a lot of alcohol especially the day before he presented to the hospital.    Chest x-ray showed that the patient is having some moderate cardiomegaly and pulmonary vascular congestion he was started on p.o.  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    Anxiety disorder     Arthritis     Atrial fibrillation ([address]     CAD (coronary artery disease) 3/25/2014    s/p CABG in may 2014    Cardiomyopathy ([address]     Chronic kidney disease     Depression     Diabetes mellitus ([address]     Drug abuse ([address]     hx of cacaine abuse, stopped abusing drugs in 2012    GERD (gastroesophageal reflux disease)     H/O cardiac catheterization 4/30/2014    Hyperlipidemia     Hypertension     Intradural extramedullary spinal tumor     Lumbar spine tumor     Medtronic dual icd      [facility]rom[facility] disorder [facility]     Other disorders of kidney and ureter in diseases classified elsewhere     Paroxysmal atrial fibrillation ([facility] 7/22/2014    V tach ([facility]     V-tach [facility])     s/p ablation in dec 2014     Past Surgical History:   Procedure Laterality Date    CARDIAC CATHETERIZATION  3/20/14     [facility]    CARDIAC DEFIBRILLATOR PLACEMENT  10/13/2015    [facility], MRI CONDITIONAL ICD    CORONARY ARTERY BYPASS GRAFT  5-9-14    3 bypass    EKG 12-LEAD  7/17/2015         OTHER SURGICAL HISTORY Left 10/21/14    Left Bursectomy, I & D Left Elbow - Dr. Emerita Amezcua LAMINECTOMY,>2 [facility] N/A 1/16/2018    LUMBAR AND SACRAL LAMINECTOMY, REMOVAL OF INTRASPINAL TUMORS performed by Alex Snell MD at [id number] harvest from legs     Social History     Socioeconomic History    Marital status:      Spouse name: Not on file    Number of children: 3    Years of education: 15    Highest education level: High school graduate   Occupational History     Employer: [employer]   Tobacco Use    Smoking status: Current Some Day Smoker     Packs/day: 0.50     Types: Cigarettes     Start date: 7/16/1980    Smokeless tobacco: Current User     Types: Snuff   Vaping Use    Vaping Use: Never used   Substance and Sexual Activity    Alcohol use:  Yes     Alcohol/week: 0.0 standard drinks     Comment: states drinking \"sometimes\"    Drug use: Yes     Types: Cocaine    Sexual activity: Not Currently     Partners: Female   Other Topics Concern    Not on file   Social History Narrative    Not on file     Social Determinants of Health     Financial Resource Strain: Low Risk     Difficulty of Paying Living Expenses: Not hard at all   Food Insecurity: No Food Insecurity    Worried About Running Out of Food in the Last Year: Never true    Ran Out of Food in the Last Year: Never true   Transportation Needs:     Lack of Transportation (Medical): Not on file    Lack of Transportation (Non-Medical): Not on file   Physical Activity:     Days of Exercise per Week: Not on file    Minutes of Exercise per Session: Not on file   Stress:     Feeling of Stress : Not on file   Social Connections:     Frequency of Communication with Friends and Family: Not on file    Frequency of Social Gatherings with Friends and Family: Not on file    Attends Synagogue Services: Not on file    Active Member of [address] [place] [address] [employer] or Organizations: Not on file    Attends Club or Organization Meetings: Not on file    Marital Status: Not on file   Intimate Partner Violence:     Fear of Current or Ex-Partner: Not on file    Emotionally Abused: Not on file    Physically Abused: Not on file    Sexually Abused: Not on file   Housing Stability:     Unable to Pay for Housing in the Last Year: Not on file    Number of [place] in the Last Year: Not on file    Unstable Housing in the Last Year: Not on file     Family History   Problem Relation Age of Onset    Diabetes Mother     High Blood Pressure Mother     Stroke Mother     Diabetes Father     Heart Disease Father     High Blood Pressure Father     Heart Disease Sister         CABG    Diabetes Maternal Grandmother     Diabetes Maternal Grandfather     Heart Disease Paternal Grandfather      Medications & Allergies      Prior to Admission medications    Medication Sig Start Date End Date Taking?  Authorizing Provider   hydrALAZINE (APRESOLINE) 100 MG tablet TAKE 1 TABLET BY MOUTH THREE TIMES DAILY 6/14/22   Fany Romero MD   atorvastatin (LIPITOR) 80 MG tablet TAKE 1 TABLET BY MOUTH EVERY NIGHT 6/2/22   ISABELLA Galdamez - CNP   BRILINTA 90 MG TABS tablet TAKE 1 TABLET BY MOUTH TWICE DAILY 5/26/22   Janet Alxeander MD   blood glucose test strips [facility] ULTRA) strip USE AS DIRECTED TWICE DAILY 5/19/22   ISABELLA Smyth CNP   ALPRAZolam Lisa Sear) 0.5 MG tablet TAKE 1 TABLET BY MOUTH AT BEDTIME FOR ANXIETY 4/25/22 10/22/22  ISABELLA Smyth CNP   warfarin (COUMADIN) 5 MG tablet USE AS DIRECTED BY COUMADIN CLINIC 4/12/22   ISABELLA Smyth CNP   isosorbide mononitrate (IMDUR) 120 MG extended release tablet Take 1 tablet by mouth daily 4/6/22   ISABELLA Smyth CNP   metoprolol succinate (TOPROL XL) 100 MG extended release tablet Take 1 tablet by mouth daily 3/14/22   Margot Upton MD   mexiletine (MEXITIL) 150 MG capsule TAKE 2 CAPSULES THREE TIMES DAILY FOR ATRIAL FIBRILLATION 2/21/22   Fany Roman MD   ONE TOUCH ULTRASOFT LANCETS MISC USE AS DIRECTED TWICE DAILY.  11/23/21   ISABELLA Smyth CNP   doxazosin (CARDURA) 2 MG tablet Take 1 tablet by mouth daily 11/23/21   ISABELLA Smyth CNP   Blood Glucose Monitoring Suppl (ONE TOUCH ULTRA 2) w/Device KIT 1 kit by Does not apply route 2 times daily 11/22/21   ISABELLA Smyth CNP   insulin glargine (LANTUS SOLOSTAR) 100 UNIT/ML injection pen Inject 20 Units into the skin nightly Increase 2 units until morning sugar > 150 11/22/21   ISABELLA Smyth CNP   Insulin Pen Needle 31G X 8 MM MISC 1 each by Does not apply route daily 11/22/21   ISABELLA Smyth CNP   linagliptin (TRADJENTA) 5 MG tablet TAKE 1 TABLET BY MOUTH DAILY 10/6/21   ISABELLA Smyth CNP   potassium chloride (KLOR-CON M) 20 MEQ extended release tablet Take 1 tablet by mouth daily 10/6/21   ISABELLA Smyth CNP   amiodarone (CORDARONE) 200 MG tablet Take 1 tablet by mouth daily 8/10/21   ISABELLA Marie CNP   losartan (COZAAR) 50 MG tablet TAKE 1 TABLET BY MOUTH DAILY 6/21/21   Reta Gloria DO   bumetanide (BUMEX) 2 MG tablet TAKE 1 TABLET BY MOUTH DAILY 2/8/21   Jazmine Dalton APRN - CNP   nitroGLYCERIN (NITROSTAT) 0.4 MG SL tablet Place 1 tablet under the tongue every 5 minutes as needed for Chest pain X 3 doses. If chest pain continues seek medical attention 3/14/18   ISABELLA Gallo CNP   glucose blood VI test strips (PHIL CONTOUR TEST) strip 1 each by In Vitro route daily 1/5/17   ISABELLA Henson CNP   acetaminophen 650 MG TABS Take 650 mg by mouth every 4 hours as needed 1/22/16   Karen Castillo MD     Allergies: Latex, Pcn [penicillins], and Zoloft [sertraline hcl]  IP meds : Scheduled Meds:   nicotine polacrilex  4 mg Oral TID    insulin glargine  10 Units SubCUTAneous BID    sodium chloride flush  10 mL IntraVENous 2 times per day    nicotine  1 patch TransDERmal Daily    thiamine  100 mg Oral Daily    insulin lispro  0-12 Units SubCUTAneous TID WC    insulin lispro  0-6 Units SubCUTAneous Nightly    amiodarone  200 mg Oral Daily    atorvastatin  80 mg Oral Daily    ticagrelor  90 mg Oral BID    bumetanide  2 mg Oral Daily    doxazosin  2 mg Oral Daily    hydrALAZINE  100 mg Oral 3 times per day    isosorbide mononitrate  120 mg Oral Daily    warfarin placeholder: dosing by pharmacy   Other RX Placeholder    mexiletine  300 mg Oral 3 times per day     Continuous Infusions:   sodium chloride       Review of Systems Physical Exam   Review of Systems   Constitutional: Positive for activity change. Negative for chills, fatigue and fever. HENT: Negative. Eyes: Negative. Negative for pain. Respiratory: Positive for shortness of breath. Negative for cough. Cardiovascular: Positive for chest pain. Negative for leg swelling. Gastrointestinal: Negative for abdominal pain, diarrhea, nausea and vomiting. Genitourinary: Negative for dysuria, frequency and hematuria. Musculoskeletal: Negative for back pain and neck pain. Skin: Negative for rash and wound. Neurological: Negative for dizziness, light-headedness and headaches. Psychiatric/Behavioral: Negative for agitation and confusion.     Physical Exam  Vitals reviewed. Constitutional:       General: He is not in acute distress. Appearance: Normal appearance. He is not diaphoretic. HENT:      Head: Normocephalic and atraumatic. Right Ear: External ear normal.      Left Ear: External ear normal.      Nose: Nose normal.      Mouth/Throat:      Mouth: Mucous membranes are moist.   Eyes:      General: No scleral icterus. Right eye: No discharge. Left eye: No discharge. Conjunctiva/sclera: Conjunctivae normal.   Neck:      Thyroid: No thyromegaly. Vascular: No JVD. Cardiovascular:      Rate and Rhythm: Normal rate and regular rhythm. Heart sounds: Normal heart sounds. No murmur heard. Pulmonary:      Effort: Pulmonary effort is normal. No respiratory distress. Breath sounds: Normal breath sounds. No stridor. No wheezing or rales. Chest:      Chest wall: No tenderness. Abdominal:      General: Bowel sounds are normal. There is no distension. Palpations: Abdomen is soft. Tenderness: There is no abdominal tenderness. Musculoskeletal:         General: No tenderness. Cervical back: Normal range of motion and neck supple. Skin:     General: Skin is warm and dry. Findings: No erythema or rash. Neurological:      General: No focal deficit present. Mental Status: He is alert and oriented to person, place, and time.    Psychiatric:         Mood and Affect: Mood normal.         Behavior: Behavior normal.           Vitals:    07/07/22 1915   BP: (!) 154/83   Pulse: 64   Resp: 16   Temp: 97.9 °F (36.6 °C)   SpO2: 98%     Labs, Radiology and Tests       Recent Labs     07/05/22  1630 07/07/22  0857   WBC 6.0 5.1   RBC 3.72* 3.48*   HGB 12.2* 11.6*   HCT 36.3* 35.0*   MCV 97.6* 100.6*   MCH 32.8 33.3*   MCHC 33.6 33.1   * 104*     Recent Labs     07/05/22  1630 07/07/22  0857    140   K 4.4 4.1    106   CO2 18* 22*   BUN 38* 43*   CREATININE 2.6* 2.9*   CALCIUM 8.5 8.4* PROT 5.8* 5.8*   LABALBU 3.6 3.2*   BILITOT 0.4 0.6   ALKPHOS 98 98   AST 43* 57*   ALT 34 42     Radiology : Chest x-ray reviewed by me shows mild prominence of pulmonary vasculature and cardiomegaly no pleural effusions. Other : Old lab data have been reviewed and noted patient baseline creatinine has been running around 2.5-2.6    Assessment    1 Renal -patient appears to be having mild acute kidney injury on chronic kidney disease stage IV, may be due to hemodynamics from fluctuating blood pressures. He also is on diuretics at this time  ? Chest x-ray does look slightly congested however patient does not appear to be in overt congestive heart failure at this time  ? At this time I will hold the diuretics, monitor renal function closely  ? Can give as needed diuretics if needed    2 Electrolytes -within normal limits  3 Mild acidosis  4 Essential hypertension with wide fluctuations may be due to cocaine abuse follow for now  5 Hx of diabetes mellitus with diabetic nephropathy  6 Chest pain possibly atypical due to cocaine abuse seen by cardiology  7 Meds reviewed and discussed with patient      **This report has been created using voice recognition software. It maycontain minor  errors which are inherent in voice recognition technology. **    Caleb Baeza MD,M.D  [facility].

## 2022-07-07 NOTE — CONSULTS
[facility]    Patient's Name/Date of Birth: Desiree Edge / 1965 (18 y.o.)    Date: July 7, 2022     Referring Provider: Reza Kulkarni MD    CHIEF COMPLAINT: Consult for CAD unstable angina elevated troponin      HPI: This is a pleasant 62 y.o. male presents with chest pain agitation. Per patient, he has had agitation and chest pain before arriving to ED, did not try medications, pain was in the middle of the chest 4 out of 10 nonradiating. Patient admits taking illicit drugs few hours before experiencing chest pain coming to ED. associated symptoms are agitation and feeling afraid and high up. CAD- Patient's past medical history significant for longstandingCAD history of STEMI with V. tach in October 2020 ended with PCI GENNARO on LAD. CABG in 2014. Rhythm-patient had V. tach and cardioversion in 2014 due to V. Tach. Chronic A. fib, on Coumadin and rate controlled with metoprolol. CHF HFrEF due to ischemic cardiomyopathy EF 25%(April 2022 MYRIAM), pacemaker/AICD    Currently patient denies chest pain heart palpitations dizziness shortness of breath peripheral edema abdominal pain chest discomfort. He reports significant improvement since admission and wants to go home.     Echo: Results for orders placed during the hospital encounter of 02/07/22    ECHO Complete 2D W Doppler W Color    Narrative  Transthoracic Echocardiography Report (TTE)    Demographics    Patient Name     Shama Lovett Gender                 Male    MR #             [id number]     Race                       Ethnicity    Account #        [de-identified]     Room Number            1841    Accession Number [id number]    Date of Study          02/08/2022    Date of Birth    1965    Referring Physician    Linda Sanchez CNP [place], [place]    Age              64 year(s)    Sonographer            Ankita Marshall RDCS    Interpreting Physician Zoie Boggs MD    Procedure    Type of Study    TTE procedure:ECHOCARDIOGRAM COMPLETE 2D W DOPPLER W COLOR. Procedure Date  Date: 02/08/2022 Start: 09:41 AM    Study Location: Bedside  Technical Quality: Limited visualization due to restricted mobility. Indications:Atrial fibrillation and AICD Discharge. Additional Medical History:Alcohol withdrawal, Drug use, V-tach, BABAK,  Osteoarthritis, Tobacco use, Coronary artery disease, CABG, NSTEMI, Stent,  ICD, Hyperlipidemia, Pulmonary edema, CHF, Diabetes, Hypertension,  Cardiomyopathy    Patient Status: Routine    Height: 73.62 inches Weight: 280.01 pounds BSA: 2.5 m^2 BMI: 36.32 kg/m^2    BP: 159/90 mmHg    Allergies  - See Epic. Conclusions    Summary  Technically difficult examination. Left ventricular size is mildly dilated and systolic function is severely  reduced. Ejection fraction was estimated at 25-30%. LV wall thickness is  within normal limits. The left atrium is moderately dilated. Device lead seen in the right heart. Signature    ----------------------------------------------------------------  Electronically signed by Alexx George MD (Interpreting  physician) on 02/08/2022 at 12:42 PM  ----------------------------------------------------------------    Findings    Mitral Valve  The mitral valve structure was normal with normal leaflet separation. DOPPLER: The transmitral velocity was within the normal range with no  evidence for mitral stenosis. Mild mitral regurgitation is present. Aortic Valve  The aortic valve was trileaflet with normal thickness and cuspal  separation. DOPPLER: Transaortic velocity was within the normal range with  no evidence of aortic stenosis. There was no evidence of aortic  regurgitation. Tricuspid Valve  The tricuspid valve structure was normal with normal leaflet separation. DOPPLER: There was no evidence of tricuspid stenosis. Pulmonic Valve  The pulmonic valve was not well visualized .     Left Atrium  The left atrium is moderately dilated. Left Ventricle  Left ventricular size is mildly dilated and systolic function is severely  reduced. Ejection fraction was estimated at 25-30%. LV wall thickness is  within normal limits. Right Atrium  Right atrial size was normal.    Right Ventricle  The right ventricular size was normal with normal systolic function and  wall thickness. Device lead seen in the right heart. Pericardial Effusion  The pericardium was normal in appearance with no evidence of a pericardial  effusion. Pleural Effusion  No evidence of pleural effusion. Aorta / Great Vessels  -Aortic root dimension within normal limits.   -IVC not visualized    M-Mode/2D Measurements & Calculations    LV Diastolic   LV Systolic Dimension:    AV Cusp Separation: 1.9 cmLA  Dimension: 6.1 5.6 cm                    Dimension: 4.5 cmAO Root  cm             LV Volume Diastolic: 208  Dimension: 3.4 cmLA Area: 24 cm^2  LV FS:8.2 %    ml  LV PW          LV Volume Systolic: 453.3  Diastolic: 0.9 ml  cm             LV EDV/LV EDV Index: 918  RV Diastolic Dimension: 2.9 cm  Septum         ml/62 m^2LV ESV/LV ESV  Diastolic: 0.8 Index: 155.7 ml/46 m^2    LA/Aorta: 1.32  cm             EF Calculated: 25.7 %  LA volume/Index: 70.1 ml /28m^2  LV Length: 8.8 cm    LV Area  Diastolic:  85.5 cm^2  LV Area  Systolic: 64.6  cm^2    Doppler Measurements & Calculations    MV Peak E-Wave: 10.3    AV Peak Velocity: 121   LVOT Peak Velocity: 94.3  cm/s                    cm/s                    cm/s  AV Peak Gradient: 5.86  LVOT Peak Gradient: 4 mmHg  MV Peak Gradient: 0.04  mmHg  mmHg                                            TV Peak E-Wave: 69 cm/s    TV Peak Gradient: 1.9 mmHg    IVRT: 95 msec           PV Peak Velocity: 78.8  cm/s  PV Peak Gradient: 2.48  MR Velocity: 329 cm/s   AV DVI (Vmax):0.78      mmHg    TX ED Velocity: 193 cm/s    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    Anxiety disorder     Arthritis     Atrial fibrillation ([address]     CAD (coronary artery disease) 3/25/2014    s/p CABG in may 2014    Cardiomyopathy ([address]     Chronic kidney disease     Depression     Diabetes mellitus ([address]     Drug abuse ([address]     hx of cacaine abuse, stopped abusing drugs in 2012    GERD (gastroesophageal reflux disease)     H/O cardiac catheterization 4/30/2014    Hyperlipidemia     Hypertension     Intradural extramedullary spinal tumor     Lumbar spine tumor     Medtronic dual icd      Neuromuscular disorder ([address]     Other disorders of kidney and ureter in diseases classified elsewhere     Paroxysmal atrial fibrillation ([address] 7/22/2014    V tach ([address]     V-tach [facility])     s/p ablation in dec 2014     Past Surgical History:   Procedure Laterality Date    CARD[facility]AC [facility]TERIZ[facility]  [facility]/20/14     [facility]    CARDIAC DEFIBRILLATOR PLACEMENT  10/13/2015    [employer] [place], MRI CONDITIONAL ICD    CORONARY ARTERY BYPASS GRAFT  5-9-14    3 bypass    EKG 12-LEAD  7/17/2015         OTHER SURGICAL HISTORY Left 10/21/14    Left Bursectomy, I & D Left Elbow - Dr. Zakiya Delatorre LAMINECTOMY,>2 [facility] N/A 1/16/2018    LUMBAR AND SACRAL LAMINECTOMY, REMOVAL OF INTRASPINAL TUMORS performed by Mavis Yo MD at [id number] harvest from legs     Current Facility-Administered Medications   Medication Dose Route Frequency Provider Last Rate Last Admin    insulin glargine (LANTUS) injection vial 10 Units  10 Units SubCUTAneous BID Zak Mcarthur MD   10 Units at 07/07/22 0759    sodium chloride flush 0.9 % injection 10 mL  10 mL IntraVENous 2 times per day Zak Mcarthur MD   10 mL at 07/06/22 2221    sodium chloride flush 0.9 % injection 10 mL  10 mL IntraVENous PRN Zak Mcarthur MD        0.9 % sodium chloride infusion   IntraVENous PRN Zak Mcarthur MD        ondansetron (ZOFRAN-ODT) disintegrating tablet 4 mg  4 mg Oral Q8H PRN Zak Mcarthur MD        Or    ondansetron (ZOFRAN) injection 4 mg  4 mg IntraVENous Q6H PRN Zak Mcarthur MD        polyethylene glycol (GLYCOLAX) packet 17 g  17 g Oral Daily PRN Zak Mcarthur MD        acetaminophen (TYLENOL) tablet 650 mg  650 mg Oral Q6H PRN Zak Mcarthur MD        Or    acetaminophen (TYLENOL) suppository 650 mg  650 mg Rectal Q6H PRN Zak Mcarthur MD        nicotine (NICODERM CQ) 14 MG/24HR 1 patch  1 patch TransDERmal Daily Zak Mcarthur MD   1 patch at 07/07/22 0801    thiamine tablet 100 mg  100 mg Oral Daily Zak Mcarthur MD   100 mg at 07/07/22 0801    LORazepam (ATIVAN) tablet 1 mg  1 mg Oral Q1H PRN Zak Mcarthur MD   1 mg at 07/07/22 0009    Or    LORazepam (ATIVAN) injection 1 mg  1 mg IntraVENous Q1H PRN Zak Mcarthur MD   1 mg at 07/06/22 0034    Or    LORazepam (ATIVAN) tablet 2 mg  2 mg Oral Q1H PRROSA Mcarthur MD        Or    LORazepam (ATIVAN) injection 2 mg  2 mg IntraVENous Q1H PRROSA Mcarthur MD   2 mg at 07/06/22 0344    Or    LORazepam (ATIVAN) tablet 3 mg  3 mg Oral Q1H PRROSA Mcarthur MD        Or    LORazepam (ATIVAN) injection 3 mg  3 mg IntraVENous Q1H PRROSA Mcarthur MD        Or    LORazepam (ATIVAN) tablet 4 mg  4 mg Oral Q1H PRN Zak Mcarthur MD        Or    LORazepam (ATIVAN) injection 4 mg  4 mg IntraVENous Q1H PRN Zak Mcarthur MD        insulin lispro (HUMALOG) injection vial 0-12 Units  0-12 Units DIRECTED BY COUMADIN CLINIC 4/12/22   ISABELLA Galdamez CNP   isosorbide mononitrate (IMDUR) 120 MG extended release tablet Take 1 tablet by mouth daily 4/6/22   ISABELLA Galdamez CNP   metoprolol succinate (TOPROL XL) 100 MG extended release tablet Take 1 tablet by mouth daily 3/14/22   Janet Alexander MD   mexiletine (MEXITIL) 150 MG capsule TAKE 2 CAPSULES THREE TIMES DAILY FOR ATRIAL FIBRILLATION 2/21/22   Fany Romero MD   ONE TOUCH ULTRASOFT LANCETS MISC USE AS DIRECTED TWICE DAILY. 11/23/21   ISABELLA Galdamez CNP   doxazosin (CARDURA) 2 MG tablet Take 1 tablet by mouth daily 11/23/21   ISABELLA Galdamez CNP   Blood Glucose Monitoring Suppl (ONE TOUCH ULTRA 2) w/Device KIT 1 kit by Does not apply route 2 times daily 11/22/21   ISABELLA Galdamez CNP   insulin glargine (LANTUS SOLOSTAR) 100 UNIT/ML injection pen Inject 20 Units into the skin nightly Increase 2 units until morning sugar > 150 11/22/21   ISABELLA Galdamez CNP   Insulin Pen Needle 31G X 8 MM MISC 1 each by Does not apply route daily 11/22/21   ISABELLA Galdamez CNP   linagliptin (TRADJENTA) 5 MG tablet TAKE 1 TABLET BY MOUTH DAILY 10/6/21   ISABELLA Galdamez CNP   potassium chloride (KLOR-CON M) 20 MEQ extended release tablet Take 1 tablet by mouth daily 10/6/21   ISABELLA Galdamez CNP   amiodarone (CORDARONE) 200 MG tablet Take 1 tablet by mouth daily 8/10/21   ISABELLA Gudino CNP   losartan (COZAAR) 50 MG tablet TAKE 1 TABLET BY MOUTH DAILY 6/21/21   Reta Gloria DO   bumetanide (BUMEX) 2 MG tablet TAKE 1 TABLET BY MOUTH DAILY 2/8/21   ISABELLA Gallo CNP   nitroGLYCERIN (NITROSTAT) 0.4 MG SL tablet Place 1 tablet under the tongue every 5 minutes as needed for Chest pain X 3 doses.  If chest pain continues seek medical attention 3/14/18   ISABELLA Gallo - CNP   glucose blood VI test strips (PHIL CONTOUR TEST) strip 1 each by In Vitro route daily 1/5/17 Types: Cigarettes     Start date: 7/16/1980    Smokeless tobacco: Current User     Types: Snuff   Vaping Use    Vaping Use: Never used   Substance and Sexual Activity    Alcohol use: Yes     Alcohol/week: 0.0 standard drinks     Comment: states drinking \"sometimes\"    Drug use: Yes     Types: Cocaine    Sexual activity: Not Currently     Partners: Female   Other Topics Concern    Not on file   Social History Narrative    Not on file     Social Determinants of Health     Financial Resource Strain: Low Risk     Difficulty of Paying Living Expenses: Not hard at all   Food Insecurity: No Food Insecurity    Worried About [facility] in the Last Year: Never true    [facility] in the Last Year: Never true   Transportation Needs:     Lack of Transportation (Medical): Not on file    Lack of Transportation (Non-Medical): Not on file   Physical Activity:     Days of Exercise per Week: Not on file    Minutes of Exercise per Session: Not on file   Stress:     Feeling of Stress : Not on file   Social Connections:     Frequency of Communication with Friends and Family: Not on file    Frequency of Social Gatherings with Friends and Family: Not on file    Attends Judaism Services: Not on file    Active Member of [address] [place] [address] or Organizations: Not on file    Attends Club or Organization Meetings: Not on file    Marital Status: Not on file   Intimate Partner Violence:     Fear of Current or Ex-Partner: Not on file    Emotionally Abused: Not on file    Physically Abused: Not on file    Sexually Abused: Not on file   Housing Stability:     Unable to Pay for Housing in the Last Year: Not on file    Number of [place] in the Last Year: Not on file    Unstable Housing in the Last Year: Not on file     ROS:   Constitutional: Denies any recent wt change.   Eyes:  Denies any blurring or double vision, no glaucoma  Ears/Nose/Mouth/Throat:  Denies any chronic sinus/rhinitis, bleeding gums  Cardiovascular: As described above. Respiratory:  Denies any frequent cough, wheezing or coughing up blood  Genitourinary:  Denies difficulty with urination and kidney stones  Gastrointestinal:  Denies any chronic problems with abdominal pain, nausea, vomiting or diarrhea  Musculoskeletal:  Denies any joint pain, back pain, or difficulty walking  Integumentary:  Denies any rash  Neurological:  No numbness or tingling  Endocrine:  Denies any polydipsia. Hematologic/Lymphatic:  Denies any hemorrhage or lymphatic drainage problems. Labs:  CBC:   Recent Labs     07/05/22  1630   WBC 6.0   HGB 12.2*   HCT 36.3*   MCV 97.6*   *     BMP:   Recent Labs     07/05/22  1630      K 4.4      CO2 18*   BUN 38*   CREATININE 2.6*   MG 2.5*     Accucheck Glucoses:   Recent Labs     07/06/22  1159 07/06/22  1544 07/06/22  1703 07/06/22  1954 07/07/22  0736   POCGLU 183* 226* 188* 190* 133*     Cardiac Enzymes: No results for input(s): CKTOTAL, CKMB, CKMBINDEX, TROPONINI in the last 72 hours. PT/INR:   Recent Labs     07/05/22  1630 07/06/22  0723 07/07/22  0434   INR 3.46* 3.66* 1.98*     APTT: No results for input(s): APTT in the last 72 hours.   Liver Profile:  Lab Results   Component Value Date/Time    AST 43 07/05/2022 04:30 PM    ALT 34 07/05/2022 04:30 PM    BILIDIR <0.2 07/05/2022 04:30 PM    BILITOT 0.4 07/05/2022 04:30 PM    ALKPHOS 98 07/05/2022 04:30 PM     Lab Results   Component Value Date/Time    CHOL 115 04/20/2022 02:18 PM    HDL 39 04/20/2022 02:18 PM    TRIG 73 04/20/2022 02:18 PM     TSH:   Lab Results   Component Value Date/Time    TSH 2.650 07/05/2022 04:30 PM     UA:   Lab Results   Component Value Date/Time    NITRITE neg 01/15/2016 10:02 AM    COLORU YELLOW 04/22/2022 03:45 PM    PHUR 6.5 04/22/2022 03:45 PM    LABCAST NONE SEEN 04/22/2022 03:45 PM    LABCAST NONE SEEN 04/22/2022 03:45 PM    WBCUA 0-2 04/22/2022 03:45 PM    RBCUA 5-10 04/22/2022 03:45 PM    MUCUS NONE SEEN 02/14/2018 09:50 AM YEAST NONE SEEN 04/22/2022 03:45 PM    BACTERIA NONE SEEN 04/22/2022 03:45 PM    SPECGRAV 1.016 04/22/2022 03:45 PM    LEUKOCYTESUR NEGATIVE 04/22/2022 03:45 PM    LEUKOCYTESUR NEGATIVE 09/24/2019 01:00 PM    UROBILINOGEN 0.2 04/22/2022 03:45 PM    BILIRUBINUR NEGATIVE 04/22/2022 03:45 PM    BLOODU NEGATIVE 04/22/2022 03:45 PM    GLUCOSEU NEGATIVE 09/24/2019 01:00 PM    AMORPHOUS NONE SEEN 02/14/2018 09:50 AM         Physical Exam:  Vitals:    07/07/22 0758   BP: (!) 185/81   Pulse: 72   Resp: 16   Temp: 98.1 °F (36.7 °C)   SpO2: 97%        Intake/Output Summary (Last 24 hours) at 7/7/2022 3348  Last data filed at 7/6/2022 1952  Gross per 24 hour   Intake 1340 ml   Output 0 ml   Net 1340 ml      General:  No acute distress  Neck: Supple, no JVD, no carotid bruits  Heart: Regular rhythm normal S1 and S2, no rubs, murmurs or gallops  Lungs: clear to ascultation no rales, wheezes, or rhonchi  Abdomen: positive bowel sounds, soft, non-tender, non-distended, no bruits, no masses  Extremities:no clubbing, cyanosis or edema  Neurologic: alert and oriented x 3, cranial nerves 2-12 grossly intact, motor and sensory intact, moving all extremities  Skin: No rashes  Psych: AO x 3, no depression/sindy, no pressured speech, normal affect  Lymph: No obvious LAD      Assessment:  Coronary artery disease involving native coronary artery of native heart with unstable angina pectoris ([facility]    1. Chest pain. Most likely due to illicit drug abuse. 2.   CAD, s/p CABG, STEMI PCI GENNARO on LAD October 2020  3. Chronic p A. fib,[id number] score 4 (CHF, hypertension, history of heart attack, diabetes) on Coumadin, rate controlled and on amiodarone, s/p cardioversion  4. CHF HFrEF EF 25%, DC-AICD in 2014  5. Elevated troponin, most likely not ischemic. 6.  History of V. tach s/p ablation, on amiodarone and mexiletine  6. Primary hypertension  7. Diabetes mellitus  8. Polysubstance abuse  9. Obesity  10. Active tobacco smoker  1. CKD stage IV  plan:  1. Monitor vital signs, chest pain, telemetry pulse ox. Since chest pain occurred after illicit drug intake and relieved by Ativan, was not related to exertion, no acute ischemic EKG changes unlikely due to ischemia therefore no ischemic work-up indicated for now. 2. Coronary artery disease risk management -antiplatelet platelet, high intensity statin, lisinopril and metoprolol  3. Continue current regimen for A. fib and anticoagulation  4. Close follow-up after hospital discharge. 5. I/Os Daily weights BMP daily and magnesium. Replete electrolyte as needed  6. Patient will benefit from social aid since he lives alone and addiction service consultation  7. Patient extensively informed and educated about quitting smoking and quitting from polysubstance drug abuse, healthy diet and physical activity. Thank you for allowing us to participate in the care of this patient. Please do not hesitate to call us with questions. Electronically signed by Bahman Quintana DO, PGY-2 on 7/7/2022 at 8:23 AM Case discussed with Kirby Abdi MD    Interventional Cardiology - [facility]    Patient seen and examined  Drug and alcohol abuse  Recurrent drug abuse  COMPLIANCE issues  Chronic angina   Feels better now  Wants to go home  We discussed COMPLIANCE   Likely home today   Continue risk factor modification and medical management  Thank you for allowing me to participate in the care of your patient.  Please don't hesitate to contact me regarding any further issues related to the patient care    [address], MD

## 2022-07-07 NOTE — CARE COORDINATION
7/7/22, 7:47 AM EDT  DISCHARGE PLANNING EVALUATION:    Greg Rodgers       Admitted: 7/5/2022/ [address] day: 2   Location: [facility]28/028-A Reason for admit: Unstable angina ([address] [I20.0]  Alcohol abuse [F10.10]  Cocaine abuse ([address] [F14.10]  Elevated troponin [R77.8]  Hx of coronary artery disease [Z86.79]  Chest pain, unspecified type [R07.9]  Chronic kidney disease, unspecified CKD stage [N18.9]  Acute on chronic congestive heart failure, unspecified heart failure type ([address] [I50.9]   PMH:  has a past medical history of Acute systolic CHF (congestive heart failure) ([address] Alcohol abuse, Anomalous origin of right coronary artery, Anxiety disorder, Arthritis, Atrial fibrillation ([address] CAD (coronary artery disease), Cardiomyopathy ([address] Chronic kidney disease, Depression, Diabetes mellitus ([address] Drug abuse ([address] GERD (gastroesophageal reflux disease), H/O cardiac catheterization, Hyperlipidemia, Hypertension, Intradural extramedullary spinal tumor, Lumbar spine tumor, Medtronic dual icd , Neuromuscular disorder ([address] Other disorders of kidney and ureter in diseases classified elsewhere, Paroxysmal atrial fibrillation ([address] V tach ([address] and V-tach ([address] Procedure:   7/5 CXR: 1. Mild cardiomegaly and probable congestive heart failure in this patient status post previous sternotomy and pacemaker placement  Barriers to Discharge:  Presented with chest pain. Does admit to cocaine use on 7/4. EF 25%, has AICD. Hospitalist following. Cardio consult. Addiction services consult. Amiodarone. Lipitor. Bumex. Cardura. Hydralazine. Imdur. Brilinta. SSI. CIWA. PCP: ISABELLA Santa CNP  Readmission Risk Score: 17.6 ( )%  Patient's Healthcare Decision Maker: Named in [address] [place] [address]    Patient Goals/Plan/Treatment Preferences: Spoke with Batsheva Figueroa, he plans to return home independently. He denies discharge needs. Transportation/Food Security/Housekeeping Addressed:  No issues identified.

## 2022-07-07 NOTE — PROGRESS NOTES
PROGRESS NOTE      Patient:  Danette Melara      Unit/Bed:3B-28/028-A    YOB: 1965    MRN: [id number]       Acct: [de-identified]     PCP: Mauri Mohs, APRN - CNP    Date of Admission: 7/5/2022      Assessment/Plan:      Active Hospital Problems    Diagnosis Date Noted    Coronary artery disease involving native coronary artery of native heart with unstable angina pectoris ([facility] [I25.110] 03/25/2014     Priority: Medium    Polysubstance abuse ([facility] [F19.10]     Type 2 diabetes mellitus with hyperglycemia, with long-term current use of insulin (HCC) [E11.65, Z79.4]     Uncontrolled hypertension [I10] 09/16/2015    Elevated troponin [R77.8]     Tobacco abuse [Z72.0] 05/04/2014       1. Chest pain w/ significant CAD history: s/p CABG. Cardiology consulted - continue medical management. Pt admits to cocaine on 4th of July. Elevated troponin 0.121, 0.099. Continue Brillinta, Statin, Imdur, OAC. 2. Polysubstance abuse: UDS shows positive amphetamine, cocaine, opiates. Patient adamantly denies meth use but states that he did cocaine on 4 July and regrets it. MAAME consulted. 3. Ischemic Cardiomyopathy, HFrEF: Latest echo shows ejection fraction 25%. Watch fluid status. Status post AICD. Continue home medications. Holding BB. 4. BABAK on CKD, stage 3: Pt follows with Dr. Terry Frias. Nephro consulted. Creatinine in April was 2.5. Continue trending BMP. 5. Atrial fibrillation, paroxysmal: Continue amiodarone. Currently rate controlled. Holding bb given cocaine use. 6. HLD: Statin.      DVT prophylaxis: warfarin    Code Status: Full Code    Disposition:  Home possibly tomorrow if renal function improves.  -----------------------------------------------------------------------------------------------------------------------------------------------    Chief Complaint: Chest pain    Hospital Course: 59-year-old male patient with present to the ED today with 1 day h/o left-sided 8/10 nonradiating chest pain.    Chest pain is now 4/10 intensity at the time of interview.  Mild shortness of breath, no palpitation, no dizziness,   no diaphoresis.       He has a history of CAD,CABG and polysubstance abuse.       Patient used cocaine in the last 24 hours.  Active chronic drinker -drinks whiskey and beers.        Subjective:  Patient reports to feeling well w/o SOB or orthopnea. No LE swelling. Renal function worsened today. Patient understand the need to stay due to worsening renal function. Medications:  Reviewed    Infusion Medications    sodium chloride       Scheduled Medications    nicotine polacrilex  4 mg Oral TID    ALPRAZolam  0.5 mg Oral Once    insulin glargine  10 Units SubCUTAneous BID    sodium chloride flush  10 mL IntraVENous 2 times per day    nicotine  1 patch TransDERmal Daily    thiamine  100 mg Oral Daily    insulin lispro  0-12 Units SubCUTAneous TID WC    insulin lispro  0-6 Units SubCUTAneous Nightly    amiodarone  200 mg Oral Daily    atorvastatin  80 mg Oral Daily    ticagrelor  90 mg Oral BID    bumetanide  2 mg Oral Daily    doxazosin  2 mg Oral Daily    hydrALAZINE  100 mg Oral 3 times per day    isosorbide mononitrate  120 mg Oral Daily    warfarin placeholder: dosing by pharmacy   Other RX Placeholder    mexiletine  300 mg Oral 3 times per day     PRN Meds: sodium chloride flush, sodium chloride, ondansetron **OR** ondansetron, polyethylene glycol, acetaminophen **OR** acetaminophen, LORazepam **OR** LORazepam **OR** LORazepam **OR** LORazepam **OR** LORazepam **OR** LORazepam **OR** LORazepam **OR** LORazepam, nitroGLYCERIN      Intake/Output Summary (Last 24 hours) at 7/7/2022 1800  Last data filed at 7/7/2022 1429  Gross per 24 hour   Intake 1100 ml   Output 0 ml   Net 1100 ml       Diet:  ADULT DIET; Regular; 4 carb choices (60 gm/meal); Low Sodium (2 gm); Low Potassium (Less than 3000 mg/day);  Low Phosphorus (Less than 1000 mg); 1800 ml    Exam:  /81 Pulse 67   Temp 98.3 °F (36.8 °C) (Oral)   Resp 16   Ht 6' 2\" (1.88 m)   Wt 281 lb 12 oz (127.8 kg)   SpO2 97%   BMI 36.17 kg/m²     Physical Exam:  General appearance: Alert, not in acute distress  HEENT:  Normal cephalic, atraumatic without obvious deformity. Pupils equal, round, and reactive to light. Conjunctivae clear. Clear oral mucosa  Neck: Supple, with full range of motion. No jugular venous distention. Trachea midline. Respiratory:  Normal respiratory effort. Mild crackles bilaterally at the lung bases. Cardiovascular: Normal rate, regular rhythm with normal S1/S2 without murmurs, rubs or gallops. Abdomen: Soft, non-tender, non-distended with normal bowel sounds. Musculoskeletal:  No clubbing, cyanosis or edema bilaterally. Full range of motion without deformity. Skin: No rashes or lesions. Neurological exam reveals alert, oriented, normal speech, no focal findings or movement disorder noted. Exam of extremities: peripheral pulses normal, no pedal or leg edema, no clubbing or cyanosis    Labs:   Recent Labs     07/05/22  1630 07/07/22  0857   WBC 6.0 5.1   HGB 12.2* 11.6*   HCT 36.3* 35.0*   * 104*     Recent Labs     07/05/22  1630 07/07/22  0857    140   K 4.4 4.1    106   CO2 18* 22*   BUN 38* 43*   CREATININE 2.6* 2.9*   CALCIUM 8.5 8.4*     Recent Labs     07/05/22  1630 07/07/22  0857   AST 43* 57*   ALT 34 42   BILIDIR <0.2  --    BILITOT 0.4 0.6   ALKPHOS 98 98     Recent Labs     07/05/22  1630 07/06/22  0723 07/07/22  0434   INR 3.46* 3.66* 1.98*     No results for input(s): CKTOTAL, TROPONINI in the last 72 hours.     Urinalysis:      Lab Results   Component Value Date/Time    NITRU NEGATIVE 04/22/2022 03:45 PM    WBCUA 0-2 04/22/2022 03:45 PM    BACTERIA NONE SEEN 04/22/2022 03:45 PM    RBCUA 5-10 04/22/2022 03:45 PM    BLOODU NEGATIVE 04/22/2022 03:45 PM    SPECGRAV 1.016 04/22/2022 03:45 PM    GLUCOSEU NEGATIVE 09/24/2019 01:00 PM       Radiology:  XR CHEST PORTABLE   Final Result   1. Mild cardiomegaly and probable congestive heart failure in this patient status post previous sternotomy and pacemaker placement         **This report has been created using voice recognition software. It may contain minor errors which are inherent in voice recognition technology. **      Final report electronically signed by DR Janas Opitz on 7/5/2022 4:51 PM          Electronically signed by Mimi Tucker MD on 7/7/2022 at 6:00 PM

## 2022-07-07 NOTE — PROGRESS NOTES
Clinical Pharmacy Note    Warfarin consult follow-up    Recent Labs     07/07/22  0434   INR 1.98*     Recent Labs     07/05/22  1630 07/07/22  0857   HGB 12.2* 11.6*   HCT 36.3* 35.0*   * 104*     Significant Drug-Drug Interactions:  New warfarin drug-drug interactions: none  Discontinued drug-drug interactions: none  Current warfarin drug-drug interactions: amiodarone (HM), ticagrelor      Date INR Warfarin Dose   7/5/22 3.46 Hold   7/6/2022  3.66 1 mg   7/7/2022   1.98  2.5 mg                                     Notes:                   PT/INR or POC-INR will be monitored routinely until therapeutic INR is achieved.     Shaan Jimenez, PharmD, BCPS, BCCCP  7/7/2022 3:32 PM

## 2022-07-07 NOTE — PLAN OF CARE
Problem: Chronic Conditions and Co-morbidities  Goal: Patient's chronic conditions and co-morbidity symptoms are monitored and maintained or improved  Outcome: Progressing  Flowsheets (Taken 7/7/2022 0241)  Care Plan - Patient's Chronic Conditions and Co-Morbidity Symptoms are Monitored and Maintained or Improved:   Monitor and assess patient's chronic conditions and comorbid symptoms for stability, deterioration, or improvement   Collaborate with multidisciplinary team to address chronic and comorbid conditions and prevent exacerbation or deterioration   Update acute care plan with appropriate goals if chronic or comorbid symptoms are exacerbated and prevent overall improvement and discharge     Problem: Discharge Planning  Goal: Discharge to home or other facility with appropriate resources  Outcome: Progressing  Flowsheets (Taken 7/7/2022 0241)  Discharge to home or other facility with appropriate resources:   Identify barriers to discharge with patient and caregiver   Arrange for needed discharge resources and transportation as appropriate     Problem: Safety - Adult  Goal: Free from fall injury  Outcome: Progressing  Flowsheets (Taken 7/7/2022 0241)  Free From Fall Injury: Instruct family/caregiver on patient safety     Problem: ABCDS Injury Assessment  Goal: Absence of physical injury  Outcome: Progressing  Flowsheets (Taken 7/7/2022 0241)  Absence of Physical Injury: Implement safety measures based on patient assessment     Problem: Pain  Goal: Verbalizes/displays adequate comfort level or baseline comfort level  Outcome: Progressing  Flowsheets (Taken 7/7/2022 0241)  Verbalizes/displays adequate comfort level or baseline comfort level:   Encourage patient to monitor pain and request assistance   Assess pain using appropriate pain scale     Problem: Cardiovascular - Adult  Goal: Maintains optimal cardiac output and hemodynamic stability  Outcome: Progressing  Flowsheets (Taken 7/7/2022 0241)  Maintains optimal cardiac output and hemodynamic stability:   Monitor blood pressure and heart rate   Assess for signs of decreased cardiac output     Care plan reviewed with patient. Patient verbalizes understanding of the care plan and contributed to goal setting.

## 2022-07-08 VITALS
BODY MASS INDEX: 36.16 KG/M2 | WEIGHT: 281.75 LBS | RESPIRATION RATE: 16 BRPM | HEIGHT: 74 IN | TEMPERATURE: 98.1 F | DIASTOLIC BLOOD PRESSURE: 79 MMHG | HEART RATE: 68 BPM | SYSTOLIC BLOOD PRESSURE: 136 MMHG | OXYGEN SATURATION: 97 %

## 2022-07-08 LAB
ALBUMIN SERPL-MCNC: 3.1 G/DL (ref 3.5–5.1)
ALP BLD-CCNC: 95 U/L (ref 38–126)
ALT SERPL-CCNC: 41 U/L (ref 11–66)
ANION GAP SERPL CALCULATED.3IONS-SCNC: 12 MEQ/L (ref 8–16)
AST SERPL-CCNC: 45 U/L (ref 5–40)
BASOPHILS # BLD: 0.6 %
BASOPHILS ABSOLUTE: 0 THOU/MM3 (ref 0–0.1)
BILIRUB SERPL-MCNC: 0.5 MG/DL (ref 0.3–1.2)
BUN BLDV-MCNC: 45 MG/DL (ref 7–22)
CALCIUM SERPL-MCNC: 8.4 MG/DL (ref 8.5–10.5)
CHLORIDE BLD-SCNC: 107 MEQ/L (ref 98–111)
CO2: 21 MEQ/L (ref 23–33)
CREAT SERPL-MCNC: 2.7 MG/DL (ref 0.4–1.2)
EOSINOPHIL # BLD: 3.2 %
EOSINOPHILS ABSOLUTE: 0.2 THOU/MM3 (ref 0–0.4)
ERYTHROCYTE [DISTWIDTH] IN BLOOD BY AUTOMATED COUNT: 13.2 % (ref 11.5–14.5)
ERYTHROCYTE [DISTWIDTH] IN BLOOD BY AUTOMATED COUNT: 49 FL (ref 35–45)
GFR SERPL CREATININE-BSD FRML MDRD: 24 ML/MIN/1.73M2
GLUCOSE BLD-MCNC: 115 MG/DL (ref 70–108)
GLUCOSE BLD-MCNC: 132 MG/DL (ref 70–108)
GLUCOSE BLD-MCNC: 276 MG/DL (ref 70–108)
HCT VFR BLD CALC: 34.7 % (ref 42–52)
HEMOGLOBIN: 11.3 GM/DL (ref 14–18)
IMMATURE GRANS (ABS): 0.01 THOU/MM3 (ref 0–0.07)
IMMATURE GRANULOCYTES: 0.2 %
INR BLD: 1.35 (ref 0.85–1.13)
LYMPHOCYTES # BLD: 14.4 %
LYMPHOCYTES ABSOLUTE: 0.7 THOU/MM3 (ref 1–4.8)
MCH RBC QN AUTO: 32.7 PG (ref 26–33)
MCHC RBC AUTO-ENTMCNC: 32.6 GM/DL (ref 32.2–35.5)
MCV RBC AUTO: 100.3 FL (ref 80–94)
MONOCYTES # BLD: 14.2 %
MONOCYTES ABSOLUTE: 0.7 THOU/MM3 (ref 0.4–1.3)
NUCLEATED RED BLOOD CELLS: 0 /100 WBC
PLATELET # BLD: 96 THOU/MM3 (ref 130–400)
PMV BLD AUTO: 10.7 FL (ref 9.4–12.4)
POTASSIUM REFLEX MAGNESIUM: 3.7 MEQ/L (ref 3.5–5.2)
RBC # BLD: 3.46 MILL/MM3 (ref 4.7–6.1)
SEG NEUTROPHILS: 67.4 %
SEGMENTED NEUTROPHILS ABSOLUTE COUNT: 3.4 THOU/MM3 (ref 1.8–7.7)
SODIUM BLD-SCNC: 140 MEQ/L (ref 135–145)
TOTAL PROTEIN: 5.7 G/DL (ref 6.1–8)
WBC # BLD: 5.1 THOU/MM3 (ref 4.8–10.8)

## 2022-07-08 PROCEDURE — 85025 COMPLETE CBC W/AUTO DIFF WBC: CPT

## 2022-07-08 PROCEDURE — 99232 SBSQ HOSP IP/OBS MODERATE 35: CPT | Performed by: INTERNAL MEDICINE

## 2022-07-08 PROCEDURE — 85610 PROTHROMBIN TIME: CPT

## 2022-07-08 PROCEDURE — 6370000000 HC RX 637 (ALT 250 FOR IP): Performed by: INTERNAL MEDICINE

## 2022-07-08 PROCEDURE — 80053 COMPREHEN METABOLIC PANEL: CPT

## 2022-07-08 PROCEDURE — 82948 REAGENT STRIP/BLOOD GLUCOSE: CPT

## 2022-07-08 PROCEDURE — 2580000003 HC RX 258: Performed by: INTERNAL MEDICINE

## 2022-07-08 PROCEDURE — 36415 COLL VENOUS BLD VENIPUNCTURE: CPT

## 2022-07-08 PROCEDURE — 6370000000 HC RX 637 (ALT 250 FOR IP): Performed by: STUDENT IN AN ORGANIZED HEALTH CARE EDUCATION/TRAINING PROGRAM

## 2022-07-08 PROCEDURE — 99239 HOSP IP/OBS DSCHRG MGMT >30: CPT | Performed by: FAMILY MEDICINE

## 2022-07-08 RX ORDER — POLYETHYLENE GLYCOL 3350 17 G
2 POWDER IN PACKET (EA) ORAL PRN
Qty: 100 EACH | Refills: 3 | Status: SHIPPED | OUTPATIENT
Start: 2022-07-08

## 2022-07-08 RX ORDER — AMLODIPINE BESYLATE 10 MG/1
10 TABLET ORAL DAILY
Status: DISCONTINUED | OUTPATIENT
Start: 2022-07-08 | End: 2022-07-08 | Stop reason: HOSPADM

## 2022-07-08 RX ORDER — NICOTINE 21 MG/24HR
1 PATCH, TRANSDERMAL 24 HOURS TRANSDERMAL DAILY
Qty: 42 PATCH | Refills: 0 | Status: SHIPPED | OUTPATIENT
Start: 2022-07-08 | End: 2022-10-03

## 2022-07-08 RX ORDER — WARFARIN SODIUM 3 MG/1
3 TABLET ORAL
Status: DISCONTINUED | OUTPATIENT
Start: 2022-07-08 | End: 2022-07-08 | Stop reason: HOSPADM

## 2022-07-08 RX ADMIN — MEXILETINE HYDROCHLORIDE 300 MG: 150 CAPSULE ORAL at 04:55

## 2022-07-08 RX ADMIN — ISOSORBIDE MONONITRATE 120 MG: 60 TABLET, EXTENDED RELEASE ORAL at 08:12

## 2022-07-08 RX ADMIN — DOXAZOSIN 2 MG: 2 TABLET ORAL at 08:10

## 2022-07-08 RX ADMIN — TICAGRELOR 90 MG: 90 TABLET ORAL at 08:13

## 2022-07-08 RX ADMIN — NICOTINE POLACRILEX 4 MG: 2 LOZENGE ORAL at 06:54

## 2022-07-08 RX ADMIN — INSULIN GLARGINE 10 UNITS: 100 INJECTION, SOLUTION SUBCUTANEOUS at 08:13

## 2022-07-08 RX ADMIN — SODIUM CHLORIDE, PRESERVATIVE FREE 10 ML: 5 INJECTION INTRAVENOUS at 08:14

## 2022-07-08 RX ADMIN — AMIODARONE HYDROCHLORIDE 200 MG: 200 TABLET ORAL at 08:10

## 2022-07-08 RX ADMIN — ACETAMINOPHEN 650 MG: 325 TABLET ORAL at 06:51

## 2022-07-08 RX ADMIN — HYDRALAZINE HYDROCHLORIDE 100 MG: 50 TABLET, FILM COATED ORAL at 04:55

## 2022-07-08 RX ADMIN — Medication 100 MG: at 08:12

## 2022-07-08 ASSESSMENT — PAIN SCALES - GENERAL
PAINLEVEL_OUTOF10: 3
PAINLEVEL_OUTOF10: 3

## 2022-07-08 ASSESSMENT — PAIN DESCRIPTION - LOCATION: LOCATION: BACK

## 2022-07-08 ASSESSMENT — PAIN DESCRIPTION - ORIENTATION: ORIENTATION: LOWER

## 2022-07-08 ASSESSMENT — PAIN - FUNCTIONAL ASSESSMENT: PAIN_FUNCTIONAL_ASSESSMENT: ACTIVITIES ARE NOT PREVENTED

## 2022-07-08 ASSESSMENT — PAIN DESCRIPTION - DESCRIPTORS: DESCRIPTORS: DISCOMFORT

## 2022-07-08 NOTE — PROGRESS NOTES
Clinical Pharmacy Note    Warfarin consult follow-up    Recent Labs     07/08/22  0351   INR 1.35*     Recent Labs     07/05/22  1630 07/07/22  0857 07/08/22  0351   HGB 12.2* 11.6* 11.3*   HCT 36.3* 35.0* 34.7*   * 104* 96*       Significant Drug-Drug Interactions:  New warfarin drug-drug interactions: none  Discontinued drug-drug interactions: none  Current warfarin drug-drug interactions: amiodarone (HM), ticagrelor      Date INR Warfarin Dose   7/5/2022 3.46 Hold   7/6/2022  3.66 1 mg   7/7/2022  1.98  2.5 mg   7/8/2022  1.35  3 mg                               Notes:                   PT/INR or POC-INR will be monitored routinely until therapeutic INR is achieved.

## 2022-07-08 NOTE — CARE COORDINATION
7/8/22, 11:16 AM EDT    Patient goals/plan/ treatment preferences discussed by  and . Patient goals/plan/ treatment preferences reviewed with patient/ family. Patient/ family verbalize understanding of discharge plan and are in agreement with goal/plan/treatment preferences. Understanding was demonstrated using the teach back method. AVS provided by RN at time of discharge, which includes all necessary medical information pertaining to the patients current course of illness, treatment, post-discharge goals of care, and treatment preferences. Discharging home independently, denied needs.      Services At/After Discharge: None

## 2022-07-08 NOTE — PLAN OF CARE
Problem: Chronic Conditions and Co-morbidities  Goal: Patient's chronic conditions and co-morbidity symptoms are monitored and maintained or improved  Outcome: Progressing  Flowsheets (Taken 7/8/2022 0137)  Care Plan - Patient's Chronic Conditions and Co-Morbidity Symptoms are Monitored and Maintained or Improved:   Monitor and assess patient's chronic conditions and comorbid symptoms for stability, deterioration, or improvement   Collaborate with multidisciplinary team to address chronic and comorbid conditions and prevent exacerbation or deterioration   Update acute care plan with appropriate goals if chronic or comorbid symptoms are exacerbated and prevent overall improvement and discharge     Problem: Discharge Planning  Goal: Discharge to home or other facility with appropriate resources  Outcome: Progressing  Flowsheets (Taken 7/8/2022 0137)  Discharge to home or other facility with appropriate resources:   Identify barriers to discharge with patient and caregiver   Arrange for needed discharge resources and transportation as appropriate     Problem: Safety - Adult  Goal: Free from fall injury  Outcome: Progressing  Flowsheets (Taken 7/8/2022 0137)  Free From Fall Injury: Instruct family/caregiver on patient safety     Problem: ABCDS Injury Assessment  Goal: Absence of physical injury  Outcome: Progressing  Flowsheets (Taken 7/8/2022 0137)  Absence of Physical Injury: Implement safety measures based on patient assessment     Problem: Pain  Goal: Verbalizes/displays adequate comfort level or baseline comfort level  Outcome: Progressing  Flowsheets (Taken 7/8/2022 0137)  Verbalizes/displays adequate comfort level or baseline comfort level: Encourage patient to monitor pain and request assistance     Problem: Cardiovascular - Adult  Goal: Maintains optimal cardiac output and hemodynamic stability  Outcome: Progressing  Flowsheets (Taken 7/8/2022 0137)  Maintains optimal cardiac output and hemodynamic stability:   Monitor blood pressure and heart rate   Assess for signs of decreased cardiac output     Care plan reviewed with patient. Patient verbalizes understanding of the plan of care and contribute to goal setting.

## 2022-07-08 NOTE — PROGRESS NOTES
[facility]   Nephrology progress note  7/8/2022, 10:36 AM      Pt Name:    Dwain Warren  MRN:     [id number]     YOB: 1965  Admit Date:    7/5/2022  4:16 PM    Chief Complaint: Nephrology following for BABAK/CKD . Subjective:  Patient seen and examined  No chest pain or shortness of breath  Feels okay    Objective:  24HR INTAKE/OUTPUT:    Intake/Output Summary (Last 24 hours) at 7/8/2022 1036  Last data filed at 7/8/2022 1008  Gross per 24 hour   Intake 1393 ml   Output 0 ml   Net 1393 ml      Admission weight: 280 lb (127 kg)  Wt Readings from Last 3 Encounters:   07/06/22 281 lb 12 oz (127.8 kg)   06/20/22 282 lb (127.9 kg)   05/11/22 282 lb (127.9 kg)        Vitals :   Vitals:    07/07/22 1915 07/07/22 2300 07/08/22 0445 07/08/22 0754   BP: (!) 154/83 (!) 170/85 (!) 173/98 (!) 162/82   Pulse: 64 67 63 66   Resp: 16 18 18 14   Temp: 97.9 °F (36.6 °C) 98 °F (36.7 °C) 98 °F (36.7 °C) 98.2 °F (36.8 °C)   TempSrc: Oral Oral Oral Oral   SpO2: 98% 98% 98% 99%   Weight:       Height:           Physical examination  General Appearance:  Well developed.  No distress  Mouth/Throat:  Oral mucosa moist  Neck:  Supple, no JVD  Lungs:  Breath sounds: clear  Heart[de-identified]  S1,S2 heard  Abdomen:  Soft, non - tender  Musculoskeletal:  Edema -no significant edema noted    Medications:  Infusion:    sodium chloride       Meds:    warfarin  3 mg Oral Once    amLODIPine  10 mg Oral Daily    nicotine polacrilex  4 mg Oral TID    insulin glargine  10 Units SubCUTAneous BID    sodium chloride flush  10 mL IntraVENous 2 times per day    nicotine  1 patch TransDERmal Daily    thiamine  100 mg Oral Daily    insulin lispro  0-12 Units SubCUTAneous TID WC    insulin lispro  0-6 Units SubCUTAneous Nightly    amiodarone  200 mg Oral Daily    atorvastatin  80 mg Oral Daily    ticagrelor  90 mg Oral BID    [Held by provider] bumetanide  2 mg Oral Daily    doxazosin  2 mg Oral Daily    hydrALAZINE  100 mg Oral 3 times per day    isosorbide mononitrate  120 mg Oral Daily    warfarin placeholder: dosing by pharmacy   Other RX Placeholder    mexiletine  300 mg Oral 3 times per day     Meds prn: sodium chloride flush, sodium chloride, ondansetron **OR** ondansetron, polyethylene glycol, acetaminophen **OR** acetaminophen, LORazepam **OR** LORazepam **OR** LORazepam **OR** LORazepam **OR** LORazepam **OR** LORazepam **OR** LORazepam **OR** LORazepam, nitroGLYCERIN     Lab Data :  CBC:   Recent Labs     07/05/22  1630 07/07/22  0857 07/08/22  0351   WBC 6.0 5.1 5.1   HGB 12.2* 11.6* 11.3*   HCT 36.3* 35.0* 34.7*   * 104* 96*     CMP:  Recent Labs     07/05/22  1630 07/07/22  0857 07/08/22  0351    140 140   K 4.4 4.1 3.7    106 107   CO2 18* 22* 21*   BUN 38* 43* 45*   CREATININE 2.6* 2.9* 2.7*   GLUCOSE 123* 182* 115*   CALCIUM 8.5 8.4* 8.4*   MG 2.5* 2.3  --      Hepatic:   Recent Labs     07/05/22  1630 07/07/22  0857 07/08/22  0351   LABALBU 3.6 3.2* 3.1*   AST 43* 57* 45*   ALT 34 42 41   BILITOT 0.4 0.6 0.5   ALKPHOS 98 98 95       Baseline Creatinine: 2.5    Assessment and Plan:  1. Renal -patient appears to be having mild acute kidney injury on chronic kidney disease stage IV, may be due to hemodynamics from fluctuating blood pressures. He also is on diuretics at this time. ? Chest x-ray does look slightly congested however patient does not appear to be in overt congestive heart failure at this time     2. Electrolytes -within normal limits  3. Mild acidosis  4. Essential hypertension with wide fluctuations may be due to cocaine abuse follow for now  5. Hx of diabetes mellitus with diabetic nephropathy  6. Chest pain possibly atypical due to cocaine abuse seen by cardiology  7. Meds reviewed and discussed with patient  8.  Overall okay from renal standpoint for discharge resume diuretics tomorrow and have a BMP drawn in 1 week      Shayla Clinton MD  [facility]    This report has been created using voice recognition software.  It may contain minor errors which are inherent in voice recognition technology

## 2022-07-08 NOTE — PLAN OF CARE
Problem: Chronic Conditions and Co-morbidities  Goal: Patient's chronic conditions and co-morbidity symptoms are monitored and maintained or improved  7/8/2022 1018 by Dede Roque RN  Outcome: Adequate for Discharge  Flowsheets (Taken 7/8/2022 0137 by Evan Falcon RN)  Care Plan - Patient's Chronic Conditions and Co-Morbidity Symptoms are Monitored and Maintained or Improved:   Monitor and assess patient's chronic conditions and comorbid symptoms for stability, deterioration, or improvement   Collaborate with multidisciplinary team to address chronic and comorbid conditions and prevent exacerbation or deterioration   Update acute care plan with appropriate goals if chronic or comorbid symptoms are exacerbated and prevent overall improvement and discharge  Note: He is on anticoagulant meds for a history of CABG and he takes Lantus for diabetes.   7/8/2022 0137 by Evan Falcon RN  Outcome: Progressing  Flowsheets (Taken 7/8/2022 0137)  Care Plan - Patient's Chronic Conditions and Co-Morbidity Symptoms are Monitored and Maintained or Improved:   Monitor and assess patient's chronic conditions and comorbid symptoms for stability, deterioration, or improvement   Collaborate with multidisciplinary team to address chronic and comorbid conditions and prevent exacerbation or deterioration   Update acute care plan with appropriate goals if chronic or comorbid symptoms are exacerbated and prevent overall improvement and discharge     Problem: Discharge Planning  Goal: Discharge to home or other facility with appropriate resources  7/8/2022 1018 by Dede Roque RN  Outcome: Adequate for Discharge  Flowsheets (Taken 7/8/2022 0137 by Evan Falcon RN)  Discharge to home or other facility with appropriate resources:   Identify barriers to discharge with patient and caregiver   Arrange for needed discharge resources and transportation as appropriate  Note: He is from home alone and plans on returning there at discharge. 7/8/2022 0137 by Hao Stringer RN  Outcome: Progressing  Flowsheets (Taken 7/8/2022 0137)  Discharge to home or other facility with appropriate resources:   Identify barriers to discharge with patient and caregiver   Arrange for needed discharge resources and transportation as appropriate     Problem: Safety - Adult  Goal: Free from fall injury  7/8/2022 1018 by Dede Benítez RN  Outcome: Adequate for Discharge  Flowsheets (Taken 7/8/2022 0137 by Hao Stringer RN)  Free From Fall Injury: Instruct family/caregiver on patient safety  Note: Bed locked & in low position, call light in reach, side-rails up x2, bed/chair alarm utilized, non-slip socks on when ambulating, reminded patient to use call light to call for assistance. 7/8/2022 0137 by Hao Stringer RN  Outcome: Progressing  Flowsheets (Taken 7/8/2022 0137)  Free From Fall Injury: Instruct family/caregiver on patient safety     Problem: ABCDS Injury Assessment  Goal: Absence of physical injury  7/8/2022 1018 by Dede Benítez RN  Outcome: Adequate for Discharge  7/8/2022 0137 by Hao Stringer RN  Outcome: Progressing  Flowsheets (Taken 7/8/2022 0137)  Absence of Physical Injury: Implement safety measures based on patient assessment     Problem: Pain  Goal: Verbalizes/displays adequate comfort level or baseline comfort level  7/8/2022 1018 by Dede Benítez RN  Outcome: Adequate for Discharge  Flowsheets (Taken 7/8/2022 0137 by Hao Stringer RN)  Verbalizes/displays adequate comfort level or baseline comfort level: Encourage patient to monitor pain and request assistance  Note: Patient denies pain so far this shift. Reminded patient to report any pain, pressure, or shortness of breath to the nurse. Will continue to monitor.       7/8/2022 0137 by Hao Stringer RN  Outcome: Progressing  Flowsheets (Taken 7/8/2022 0137)  Verbalizes/displays adequate comfort level or baseline comfort level: Encourage patient to monitor pain and request assistance     Problem: Cardiovascular - Adult  Goal: Maintains optimal cardiac output and hemodynamic stability  7/8/2022 1018 by Dede Patterson RN  Outcome: Adequate for Discharge  Flowsheets (Taken 7/8/2022 0137 by Dirk Goodman RN)  Maintains optimal cardiac output and hemodynamic stability:   Monitor blood pressure and heart rate   Assess for signs of decreased cardiac output  Note: Ongoing assessment & interventions provided throughout shift. Patient on continuous telemetry monitoring, heart tones, vitals & pulses checked at least 3 times per shift & PRN. Vitals within acceptable limits. Peripheral pulses palpable. 7/8/2022 0137 by Dirk Goodman RN  Outcome: Progressing  Flowsheets (Taken 7/8/2022 0137)  Maintains optimal cardiac output and hemodynamic stability:   Monitor blood pressure and heart rate   Assess for signs of decreased cardiac output      Care plan reviewed with patient. Patient verbalizes understanding of the care plan and contributed to goal setting.

## 2022-07-08 NOTE — DISCHARGE SUMMARY
DISCHARGE SUMMARY      Patient Identification:   Johnny Obregon   : 1965  MRN: [id number]   Account: [de-identified]      Patient's PCP: ISABELLA Rodriguez CNP    Admit Date: 2022     Discharge Date: 2022      Admitting Physician: Artur Schumacher MD     Discharge Physician: Jenna Vásquez MD     Discharge Diagnoses: Active Hospital Problems    Diagnosis Date Noted    Essential hypertension [I10] 2020     Priority: High    Acute on chronic congestive heart failure (HCC) [I50.9]      Priority: Medium    Coronary artery disease involving native coronary artery of native heart with unstable angina pectoris ([facility] [address] [I25.110] 2014     Priority: Medium    Polysubstance abuse ([facility] [address] [F19.10]     Chronic kidney disease [N18.9] 2017    Type 2 diabetes mellitus with hyperglycemia, with long-term current use of insulin (HCC) [E11.65, Z79.4]     Uncontrolled hypertension [I10] 2015    Elevated troponin [R77.8]     Tobacco abuse [Z72.0] 2014     The patient was seen and examined on day of discharge and this discharge summary is in conjunction with any daily progress note from day of discharge. Hospital Course:   Johnny Obregon is a 62 y.o. male w/ hx of HFrEF, CAD s/ p CABG, CKD 3, AFIB on warfarin, HLD, HTN who was admitted to [facility] on 2022 for chest pain in setting of significant CAD history having had hx of CABG. His EF is 25% while inpatient. He reported to heavily using cocaine on  along with heavy alcohol use, albeit expressed significant regret and remorse afterwards. On admission, patient had an BABAK that improved on day of discharge  He had a mild troponin elevation on admission that remained stable. Cardiology was consulted, who recommended medical management. Nephrology saw patient for BABAK and set patient up with BMP within a week and outpatient follow up thereafter to monitor his renal function.      BB was held during inpatient stay but after discussion with patient and informing him of the risks of cocaine use with beta blockers, he expressed understanding and stated he will not use cocaine again. Beta blockers was resumed for patient as outgoing medication. Nicotine patches and lozenges were prescribed for patient as well on discharge. Clinical Pharmacy Warfarin Discharge Recommendation         Recent Labs     07/08/22  0351   INR 1.35*         Notes: Take Coumadin as follows:  7/8/22:-Take 3 mg before discharge  7/9/22: Take 2.5 mg   7/10/22: Take 5 mg   7/11/22: Please follow up with [facility] at 2:15 pm and follow their recommendation      Exam:     Vitals:  Vitals:    07/07/22 2300 07/08/22 0445 07/08/22 0754 07/08/22 1111   BP: (!) 170/85 (!) 173/98 (!) 162/82 136/79   Pulse: 67 63 66 68   Resp: 18 18 14 16   Temp: 98 °F (36.7 °C) 98 °F (36.7 °C) 98.2 °F (36.8 °C) 98.1 °F (36.7 °C)   TempSrc: Oral Oral Oral Oral   SpO2: 98% 98% 99% 97%   Weight:       Height:         Weight: Weight: 281 lb 12 oz (127.8 kg)     24 hour intake/output:    Intake/Output Summary (Last 24 hours) at 7/8/2022 1823  Last data filed at 7/8/2022 1008  Gross per 24 hour   Intake 913 ml   Output 0 ml   Net 913 ml         Physical Exam:  General appearance: Alert, not in acute distress  HEENT:  Normal cephalic, atraumatic without obvious deformity. Pupils equal, round, and reactive to light. Conjunctivae clear. Clear oral mucosa  Neck: Supple, with full range of motion. No jugular venous distention. Trachea midline. Respiratory:  Normal respiratory effort. Clear to auscultation, bilaterally without Rales/Wheezes/Rhonchi. Cardiovascular: Normal rate, regular rhythm with normal S1/S2 without murmurs, rubs or gallops. Abdomen: Soft, non-tender, non-distended with normal bowel sounds. Musculoskeletal:  No clubbing, cyanosis or edema bilaterally. Full range of motion without deformity.   Skin: No rashes or lesions. Neurological exam reveals alert, oriented, normal speech, no focal findings or movement disorder noted. Exam of extremities: peripheral pulses normal, no pedal or leg edema, no clubbing or cyanosis      Labs: For convenience and continuity at follow-up the following most recent labs are provided:      CBC:    Lab Results   Component Value Date/Time    WBC 5.1 07/08/2022 03:51 AM    HGB 11.3 07/08/2022 03:51 AM    HCT 34.7 07/08/2022 03:51 AM    PLT 96 07/08/2022 03:51 AM       Renal:    Lab Results   Component Value Date/Time     07/08/2022 03:51 AM    K 3.7 07/08/2022 03:51 AM     07/08/2022 03:51 AM    CO2 21 07/08/2022 03:51 AM    BUN 45 07/08/2022 03:51 AM    CREATININE 2.7 07/08/2022 03:51 AM    CALCIUM 8.4 07/08/2022 03:51 AM    PHOS 3.9 04/22/2022 03:45 PM         Significant Diagnostic Studies    Radiology:   XR CHEST PORTABLE   Final Result   1. Mild cardiomegaly and probable congestive heart failure in this patient status post previous sternotomy and pacemaker placement         **This report has been created using voice recognition software. It may contain minor errors which are inherent in voice recognition technology. **      Final report electronically signed by DR Medina Elise on 7/5/2022 4:51 PM             Consults:     IP CONSULT TO SOCIAL WORK  IP CONSULT TO ADDICTION SERVICES  IP CONSULT TO PHARMACY  IP CONSULT TO CARDIOLOGY  IP CONSULT TO NEPHROLOGY    Disposition:    [x] Home       [] TCU       [] Rehab       [] Psych       [] SNF       [] [place]       [] Other-    Condition at Discharge: Stable    Code Status:  Prior     Patient Instructions:    Discharge lab work: BMP before nephrology appointment  Activity: activity as tolerated  Diet: No diet orders on file      Follow-up visits:   [facility]  [address] [place] [address]  [facility]  Schedule an appointment as soon as possible for a visit on 7/18/2022  Appt sometime from 12-12:30 or 2-3pm on 18th so he can go see Dr. Atkinson Friday and touch base with CHF clinic conveniently.           Discharge Medications:        Medication List      START taking these medications    nicotine 21 MG/24HR  Commonly known as: [address] [facility] 1 patch onto the skin daily     nicotine polacrilex 2 MG lozenge  Commonly known as: COMMIT  Take 1 lozenge by mouth as needed for Smoking cessation        CONTINUE taking these medications    Acetaminophen 650 MG Tabs  Take 650 mg by mouth every 4 hours as needed     ALPRAZolam 0.5 MG tablet  Commonly known as: XANAX  TAKE 1 TABLET BY MOUTH AT BEDTIME FOR ANXIETY     amiodarone 200 MG tablet  Commonly known as: CORDARONE  Take 1 tablet by mouth daily     atorvastatin 80 MG tablet  Commonly known as: LIPITOR  TAKE 1 TABLET BY MOUTH EVERY NIGHT     * blood glucose test strips strip  Commonly known as: Umthunzi Contour Test  1 each by In Vitro route daily     * OneTouch Ultra strip  Generic drug: blood glucose test strips  USE AS DIRECTED TWICE DAILY     Brilinta 90 MG Tabs tablet  Generic drug: ticagrelor  TAKE 1 TABLET BY MOUTH TWICE DAILY     bumetanide 2 MG tablet  Commonly known as: BUMEX  TAKE 1 TABLET BY MOUTH DAILY     doxazosin 2 MG tablet  Commonly known as: Cardura  Take 1 tablet by mouth daily     hydrALAZINE 100 MG tablet  Commonly known as: APRESOLINE  TAKE 1 TABLET BY MOUTH THREE TIMES DAILY     Insulin Pen Needle 31G X 8 MM Misc  1 each by Does not apply route daily     isosorbide mononitrate 120 MG extended release tablet  Commonly known as: IMDUR  Take 1 tablet by mouth daily     Lantus SoloStar 100 UNIT/ML injection pen  Generic drug: insulin glargine  Inject 20 Units into the skin nightly Increase 2 units until morning sugar > 150     linagliptin 5 MG tablet  Commonly known as: Tradjenta  TAKE 1 TABLET BY MOUTH DAILY     losartan 50 MG tablet  Commonly known as: COZAAR  TAKE 1 TABLET BY MOUTH DAILY     metoprolol succinate 100 MG extended release tablet  Commonly known as: Toprol XL  Take 1 tablet by mouth daily     mexiletine 150 MG capsule  Commonly known as: MEXITIL  TAKE 2 CAPSULES THREE TIMES DAILY FOR ATRIAL FIBRILLATION     nitroGLYCERIN 0.4 MG SL tablet  Commonly known as: Nitrostat  Place 1 tablet under the tongue every 5 minutes as needed for Chest pain X 3 doses. If chest pain continues seek medical attention     ONE TOUCH ULTRA 2 w/Device Kit  1 kit by Does not apply route 2 times daily     ONE TOUCH ULTRASOFT LANCETS Misc  USE AS DIRECTED TWICE DAILY. potassium chloride 20 MEQ extended release tablet  Commonly known as: KLOR-CON M  Take 1 tablet by mouth daily     warfarin 5 MG tablet  Commonly known as: COUMADIN  USE AS DIRECTED BY COUMADIN CLINIC         * This list has 2 medication(s) that are the same as other medications prescribed for you. Read the directions carefully, and ask your doctor or other care provider to review them with you. Where to Get Your Medications      These medications were sent to [facility] [id number] [id number] - [place], [address] [facility] [phone]  Rigoberto He [address] [place] [id number]    Phone: [phone]   · nicotine 21 MG/24HR  · nicotine polacrilex 2 MG lozenge         Time Spent on discharge is more than 1 hour in the examination, evaluation, counseling and review of medications and discharge plan. Signed: Thank you ISABELLA Blevins CNP for the opportunity to be involved in this patient's care.     Electronically signed by Salazar Llanos MD on 7/8/2022 at 6:23 PM

## 2022-07-08 NOTE — PROGRESS NOTES
Clinical Pharmacy Warfarin Discharge Recommendation    Recent Labs     07/08/22  0351   INR 1.35*       Notes: Take Coumadin as follows:  7/8/22:-Take 3 mg before discharge  7/9/22: Take 2.5 mg   7/10/22:  Take 5 mg   7/11/22: Please follow up with [facility] at 2:15 pm and follow their recommendation

## 2022-07-11 ENCOUNTER — CARE COORDINATION (OUTPATIENT)
Dept: CASE MANAGEMENT | Age: 57
End: 2022-07-11

## 2022-07-11 ENCOUNTER — TELEPHONE (OUTPATIENT)
Dept: FAMILY MEDICINE CLINIC | Age: 57
End: 2022-07-11

## 2022-07-11 DIAGNOSIS — E11.22 CONTROLLED TYPE 2 DIABETES MELLITUS WITH CHRONIC KIDNEY DISEASE, WITHOUT LONG-TERM CURRENT USE OF INSULIN, UNSPECIFIED CKD STAGE (HCC): ICD-10-CM

## 2022-07-11 DIAGNOSIS — R07.9 CHEST PAIN, UNSPECIFIED TYPE: Primary | ICD-10-CM

## 2022-07-11 NOTE — TELEPHONE ENCOUNTER
[facility] Transitions Initial Follow Up Call    Outreach made within 2 business days of discharge: Yes    Patient: Dwain Warren Patient : 1965   MRN: [id number]  Reason for Admission: There are no discharge diagnoses documented for the most recent discharge. Discharge Date: 22       Spoke with: Pt    Discharge department/facility: [facility]    TCM Interactive Patient Contact:  Was patient able to fill all prescriptions: Yes  Was patient instructed to bring all medications to the follow-up visit: Yes  Is patient taking all medications as directed in the discharge summary?  Yes  Does patient understand their discharge instructions: Yes  Does patient have questions or concerns that need addressed prior to 7-14 day follow up office visit: no    Scheduled appointment with PCP within 7-14 days    Follow Up  Future Appointments   Date Time Provider Tate Hunt   2022  1:00 PM Aisha York, [address] [place] [address]   2022  1:15 PM MD ROSA Donald [facility] [address] [facility]   2022  2:00 PM MD ROSA [facility] [place]   8/15/2022  3:20 PM MD ROSA Kirby [facility]. [place]   2022  3:30 PM ISABELLA Khan   10/3/2022  1:00 PM MD ROSA Donald [facility] [place]   2023  2:00 PM SCHEDULE, [facility] PACER NURSE N [facility] PACER [facility] - [address], [address] ([address] [place] [address])

## 2022-07-11 NOTE — TELEPHONE ENCOUNTER
[address] fax received, insurance is requesting a 90 day supply with refills of Lantus. Please review and send new script.

## 2022-07-11 NOTE — CARE COORDINATION
[facility] Transitions Initial Follow Up Call    Call within 2 business days of discharge: Yes    Patient: Patricia Walden Patient : 1965   MRN: [id number]  Reason for Admission: Chest pain, unspecified type  Discharge Date: 22 RARS: Readmission Risk Score: 20.1 ( )      Last Discharge [facility]       Complaint Diagnosis Description Type Department Provider    22 Chest Pain; Alcohol Intoxication Chest pain, unspecified type . .. ED to Hosp-Admission (Discharged) (ADMITTED) URSULA 3B Isabel Robin MD; Da[place]an [place]... Spoke briefly with Marlyn Pulliamtsman, said he is at work. Said he is feeling alright, just has a bad cold, is congested. Will see PCP tomorrow, aware of other f/u appts this month. Denies fever, chest pain, SOB. Appetite and fluid intake is good. Reviewed medications/changes, taking as directed. Denies needs. No other questions or concerns at this time. Will continue to follow. Has appt with Dr Tadeo Obando  and discharge summary said to see CHF clinic same day, but no order. Called CHF clinic, spoke with Ricci Rouse, said he had been a pt there but was dropped d/t non compliance. She will put a note on appt desk for Dr Tadeo Obando if he wants to refer to CHF clinic. Non-face-to-face services provided:  Scheduled appointment with PCP-  Scheduled appointment with Specialist-  Obtained and reviewed discharge summary and/or continuity of care documents    Care Transitions 24 Hour Call    Schedule Follow Up Appointment with PCP: Completed  Do you have a copy of your discharge instructions?: Yes  Do you have all of your prescriptions and are they filled?: Yes  Have you been contacted by a [facility] Pharmacist?: No  Have you scheduled your follow up appointment?: Yes  How are you going to get to your appointment?: Car - drive self  Post Acute Services:  Outpatient/Community Services (Comment: pt has decided to go to OP PT)  Do you feel like you have everything you need to keep you well at home?: Yes  Care Transitions Interventions     Transportation Support: Declined      Transitions of Care Initial Call      Challenges to be reviewed by the provider   Additional needs identified to be addressed with provider: No  none             Method of communication with provider : none    Advance Care Planning:   Does patient have an Advance Directive: reviewed and current. Care Transition Nurse contacted the patient by telephone to perform post hospital discharge assessment. Verified name and  with patient as identifiers. Provided introduction to self, and explanation of the CTN role. CTN reviewed discharge instructions, medical action plan and red flags with patient who verbalized understanding. Patient given an opportunity to ask questions and does not have any further questions or concerns at this time. Were discharge instructions available to patient? Yes. Reviewed appropriate site of care based on symptoms and resources available to patient including: PCP  Specialist. The patient agrees to contact the PCP office for questions related to their healthcare. Medication reconciliation was performed with patient, who verbalizes understanding of administration of home medications. Advised obtaining a 90-day supply of all daily and as-needed medications. Was patient discharged with a pulse oximeter? no    CTN provided contact information. Plan for follow-up call in 5-7 days based on severity of symptoms and risk factors.   Plan for next call: symptom management-chest pain?  self management-monitroing wt? (CHF), BS stable?  follow up appointment-did he go to f/u  (PCP)  (Dr Chadwick Ramirez)      Follow Up  Future Appointments   Date Time Provider Tate Hunt   2022  3:15 PM ISABELLA Garrison   2022  1:15 PM John Lr MD N [facility] [place] - [place]   2022  2:00 PM Nick Copeland MD N [address] P - [place]   8/15/2022  3:20 PM MD ROSA Lemons [facility], [facility]. [facility] - [place]   8/22/2022  3:30 PM Audree Rubinstein, APRN - [place]   10/3/2022  1:00 PM Vivica Heimlich, [facility] [place]   5/18/2023  2:00 PM SCHEDULE, [facility] Bing PACER [facility] - [place], [facility]

## 2022-07-12 ENCOUNTER — OFFICE VISIT (OUTPATIENT)
Dept: FAMILY MEDICINE CLINIC | Age: 57
End: 2022-07-12
Payer: COMMERCIAL

## 2022-07-12 VITALS
SYSTOLIC BLOOD PRESSURE: 132 MMHG | DIASTOLIC BLOOD PRESSURE: 74 MMHG | HEART RATE: 72 BPM | RESPIRATION RATE: 16 BRPM | BODY MASS INDEX: 36.22 KG/M2 | WEIGHT: 282.1 LBS

## 2022-07-12 DIAGNOSIS — I25.810 CORONARY ARTERY DISEASE INVOLVING CORONARY BYPASS GRAFT OF NATIVE HEART WITHOUT ANGINA PECTORIS: ICD-10-CM

## 2022-07-12 DIAGNOSIS — I48.91 ATRIAL FIBRILLATION WITH RVR (HCC): ICD-10-CM

## 2022-07-12 DIAGNOSIS — F19.10 POLYSUBSTANCE ABUSE (HCC): ICD-10-CM

## 2022-07-12 DIAGNOSIS — R07.9 CHEST PAIN, UNSPECIFIED TYPE: ICD-10-CM

## 2022-07-12 DIAGNOSIS — E11.22 CONTROLLED TYPE 2 DIABETES MELLITUS WITH CHRONIC KIDNEY DISEASE, WITHOUT LONG-TERM CURRENT USE OF INSULIN, UNSPECIFIED CKD STAGE (HCC): ICD-10-CM

## 2022-07-12 DIAGNOSIS — I10 ESSENTIAL HYPERTENSION: ICD-10-CM

## 2022-07-12 DIAGNOSIS — F41.3 OTHER MIXED ANXIETY DISORDERS: ICD-10-CM

## 2022-07-12 DIAGNOSIS — N18.32 STAGE 3B CHRONIC KIDNEY DISEASE (HCC): ICD-10-CM

## 2022-07-12 DIAGNOSIS — Z09 HOSPITAL DISCHARGE FOLLOW-UP: Primary | ICD-10-CM

## 2022-07-12 DIAGNOSIS — N17.9 AKI (ACUTE KIDNEY INJURY) (HCC): ICD-10-CM

## 2022-07-12 DIAGNOSIS — E78.2 MIXED HYPERLIPIDEMIA: ICD-10-CM

## 2022-07-12 DIAGNOSIS — Z95.810 ICD (IMPLANTABLE CARDIOVERTER-DEFIBRILLATOR) IN PLACE: ICD-10-CM

## 2022-07-12 DIAGNOSIS — I50.20 HFREF (HEART FAILURE WITH REDUCED EJECTION FRACTION) (HCC): ICD-10-CM

## 2022-07-12 DIAGNOSIS — I42.0 DILATED CARDIOMYOPATHY (HCC): ICD-10-CM

## 2022-07-12 PROCEDURE — 99496 TRANSJ CARE MGMT HIGH F2F 7D: CPT | Performed by: NURSE PRACTITIONER

## 2022-07-12 PROCEDURE — 1111F DSCHRG MED/CURRENT MED MERGE: CPT | Performed by: NURSE PRACTITIONER

## 2022-07-12 RX ORDER — ESCITALOPRAM OXALATE 10 MG/1
10 TABLET ORAL DAILY
Qty: 30 TABLET | Refills: 1 | Status: SHIPPED | OUTPATIENT
Start: 2022-07-12 | End: 2022-09-06

## 2022-07-12 RX ORDER — INSULIN GLARGINE 100 [IU]/ML
45 INJECTION, SOLUTION SUBCUTANEOUS NIGHTLY
Qty: 15 PEN | Refills: 3 | Status: SHIPPED | OUTPATIENT
Start: 2022-07-12

## 2022-07-12 ASSESSMENT — PATIENT HEALTH QUESTIONNAIRE - PHQ9
9. THOUGHTS THAT YOU WOULD BE BETTER OFF DEAD, OR OF HURTING YOURSELF: 0
3. TROUBLE FALLING OR STAYING ASLEEP: 0
7. TROUBLE CONCENTRATING ON THINGS, SUCH AS READING THE NEWSPAPER OR WATCHING TELEVISION: 0
SUM OF ALL RESPONSES TO PHQ QUESTIONS 1-9: 0
2. FEELING DOWN, DEPRESSED OR HOPELESS: 0
10. IF YOU CHECKED OFF ANY PROBLEMS, HOW DIFFICULT HAVE THESE PROBLEMS MADE IT FOR YOU TO DO YOUR WORK, TAKE CARE OF THINGS AT HOME, OR GET ALONG WITH OTHER PEOPLE: 0
6. FEELING BAD ABOUT YOURSELF - OR THAT YOU ARE A FAILURE OR HAVE LET YOURSELF OR YOUR FAMILY DOWN: 0
SUM OF ALL RESPONSES TO PHQ QUESTIONS 1-9: 0
SUM OF ALL RESPONSES TO PHQ QUESTIONS 1-9: 0
1. LITTLE INTEREST OR PLEASURE IN DOING THINGS: 0
SUM OF ALL RESPONSES TO PHQ QUESTIONS 1-9: 0
8. MOVING OR SPEAKING SO SLOWLY THAT OTHER PEOPLE COULD HAVE NOTICED. OR THE OPPOSITE, BEING SO FIGETY OR RESTLESS THAT YOU HAVE BEEN MOVING AROUND A LOT MORE THAN USUAL: 0
4. FEELING TIRED OR HAVING LITTLE ENERGY: 0
5. POOR APPETITE OR OVEREATING: 0
SUM OF ALL RESPONSES TO PHQ9 QUESTIONS 1 & 2: 0

## 2022-07-12 ASSESSMENT — ENCOUNTER SYMPTOMS
NAUSEA: 0
COUGH: 0
ABDOMINAL PAIN: 0
SHORTNESS OF BREATH: 0

## 2022-07-12 NOTE — PATIENT INSTRUCTIONS
You may receive a survey about your visit with us today. The feedback from our patients helps us identify what is working well and where the service to all patients can be enhanced. Thank you! Tobacco Cessation Programs     Telephonic behavior modification  Ori Basurto ([phone])   Counseling service for those who are ready to quit using tobacco.     Available for uninsured [place] residents, [place] recipients, pregnant women, or patients whose health plans or employers are members of the [address] [place] [address] behavior modification   http://[place]. Quitlogix. org   Online support program to help patients through each step of the quitting process. Available 24 hours a day 7 days a week. Provides up to date researched based tool, step-by-step guides, and motivational messages. Online behavior modification   www.lungusa.org/stop-smoking/how-to-quit   HelpLine: [facility] (006-5250)   Email questions to:  Diya@Yvolver. Souq.com    Website offers resources to help tobacco users figure out their reasons for quitting and then take the big step of quitting for good. Hypnosis   Location: [facility], [place] AM [place]   Contact: Aileen Boyd, PhD at [phone]   Hypnosis for tobacco cessation   Cost $225 for the initial session and $175 for each session afterwards. Most patients require 6-8 sessions. There is the option to submit through the patients insurance. Hypnosis and behavior modification   Location: [place] [address],  [address] [place] [employer] [place]   Contact: Helen Coffey, PhD at [phone]  [facility] Counseling and hypnosis for nicotine addition   Cost: For uninsured patients:  Please call above phone number  Cost for insured patients depends on patients insurance plan.     Behavior modification   Location: [facility], [address] [place] AM [place] [employer], [address]: [address] four one-on-one appointments between the patient and a respiratory therapist.  The four appointments span over three weeks. The respiratory therapist schedules one of the appointments to occur 48 hours after the patients quit date.  Cost $100 total for the four sessions.   Tobacco cessation products are not included in the cost and are not provided by [facility].
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
## 2022-07-18 ENCOUNTER — NURSE ONLY (OUTPATIENT)
Dept: LAB | Age: 57
End: 2022-07-18

## 2022-07-18 ENCOUNTER — OFFICE VISIT (OUTPATIENT)
Dept: CARDIOLOGY CLINIC | Age: 57
End: 2022-07-18
Payer: COMMERCIAL

## 2022-07-18 VITALS
HEART RATE: 61 BPM | BODY MASS INDEX: 35.78 KG/M2 | DIASTOLIC BLOOD PRESSURE: 86 MMHG | WEIGHT: 278.8 LBS | SYSTOLIC BLOOD PRESSURE: 155 MMHG | HEIGHT: 74 IN

## 2022-07-18 DIAGNOSIS — R07.9 CHEST PAIN, UNSPECIFIED TYPE: ICD-10-CM

## 2022-07-18 DIAGNOSIS — I10 HTN (HYPERTENSION), BENIGN: ICD-10-CM

## 2022-07-18 DIAGNOSIS — I10 ESSENTIAL HYPERTENSION: Primary | ICD-10-CM

## 2022-07-18 DIAGNOSIS — I25.709 CORONARY ARTERY DISEASE INVOLVING CORONARY BYPASS GRAFT OF NATIVE HEART WITH ANGINA PECTORIS (HCC): ICD-10-CM

## 2022-07-18 DIAGNOSIS — N18.4 CKD (CHRONIC KIDNEY DISEASE) STAGE 4, GFR 15-29 ML/MIN (HCC): ICD-10-CM

## 2022-07-18 DIAGNOSIS — N28.89 RENAL MASS, LEFT: ICD-10-CM

## 2022-07-18 DIAGNOSIS — E11.21 DIABETIC NEPHROPATHY ASSOCIATED WITH TYPE 2 DIABETES MELLITUS (HCC): ICD-10-CM

## 2022-07-18 DIAGNOSIS — I48.0 PAROXYSMAL ATRIAL FIBRILLATION (HCC): ICD-10-CM

## 2022-07-18 DIAGNOSIS — N17.9 AKI (ACUTE KIDNEY INJURY) (HCC): ICD-10-CM

## 2022-07-18 LAB
ALBUMIN SERPL-MCNC: 3.8 G/DL (ref 3.5–5.1)
ALP BLD-CCNC: 93 U/L (ref 38–126)
ALT SERPL-CCNC: 47 U/L (ref 11–66)
ANION GAP SERPL CALCULATED.3IONS-SCNC: 14 MEQ/L (ref 8–16)
AST SERPL-CCNC: 41 U/L (ref 5–40)
BILIRUB SERPL-MCNC: 0.4 MG/DL (ref 0.3–1.2)
BUN BLDV-MCNC: 38 MG/DL (ref 7–22)
CALCIUM SERPL-MCNC: 8.9 MG/DL (ref 8.5–10.5)
CHLORIDE BLD-SCNC: 108 MEQ/L (ref 98–111)
CO2: 20 MEQ/L (ref 23–33)
CREAT SERPL-MCNC: 2.6 MG/DL (ref 0.4–1.2)
ERYTHROCYTE [DISTWIDTH] IN BLOOD BY AUTOMATED COUNT: 13.6 % (ref 11.5–14.5)
ERYTHROCYTE [DISTWIDTH] IN BLOOD BY AUTOMATED COUNT: 50.4 FL (ref 35–45)
GFR SERPL CREATININE-BSD FRML MDRD: 26 ML/MIN/1.73M2
GLUCOSE BLD-MCNC: 176 MG/DL (ref 70–108)
HCT VFR BLD CALC: 35.9 % (ref 42–52)
HEMOGLOBIN: 11.5 GM/DL (ref 14–18)
MCH RBC QN AUTO: 32.3 PG (ref 26–33)
MCHC RBC AUTO-ENTMCNC: 32 GM/DL (ref 32.2–35.5)
MCV RBC AUTO: 100.8 FL (ref 80–94)
PLATELET # BLD: 146 THOU/MM3 (ref 130–400)
PMV BLD AUTO: 10.4 FL (ref 9.4–12.4)
POTASSIUM SERPL-SCNC: 4.7 MEQ/L (ref 3.5–5.2)
PTH INTACT: 129.4 PG/ML (ref 15–65)
RBC # BLD: 3.56 MILL/MM3 (ref 4.7–6.1)
SODIUM BLD-SCNC: 142 MEQ/L (ref 135–145)
TOTAL PROTEIN: 6 G/DL (ref 6.1–8)
VITAMIN D 25-HYDROXY: 19 NG/ML (ref 30–100)
WBC # BLD: 7.6 THOU/MM3 (ref 4.8–10.8)

## 2022-07-18 PROCEDURE — 93000 ELECTROCARDIOGRAM COMPLETE: CPT | Performed by: NUCLEAR MEDICINE

## 2022-07-18 PROCEDURE — 99214 OFFICE O/P EST MOD 30 MIN: CPT | Performed by: NUCLEAR MEDICINE

## 2022-07-18 RX ORDER — RANOLAZINE 500 MG/1
500 TABLET, EXTENDED RELEASE ORAL 2 TIMES DAILY
Qty: 180 TABLET | Refills: 3 | Status: SHIPPED | OUTPATIENT
Start: 2022-07-18

## 2022-07-18 RX ORDER — RANOLAZINE 500 MG/1
500 TABLET, EXTENDED RELEASE ORAL 2 TIMES DAILY
COMMUNITY
End: 2022-07-18 | Stop reason: SDUPTHER

## 2022-07-18 NOTE — PROGRESS NOTES
[address]  [address]  [facility] [address]  Dept: [phone]  Dept Fax: [phone]  Loc: [phone]    Visit Date: 7/18/2022    Luis Sherman is a 62 y.o. male who presents todayfor:  Chief Complaint   Patient presents with    3 Month Follow-Up    Check-Up    Coronary Artery Disease    Hyperlipidemia    Hypertension    Cardiomyopathy    Palpitations   Compliance issues  Admitted for times with alcohol intoxication   Was not taking meds for a while  Lately some chest pain   Intermittent   Discomfort  BP is higher  No dizziness  No syncope  Known CAD , CABG , A fib and HTN  Stopped smoking   A fib is stable   Cardioverted lately   Still in rhythm   Known severe CMP   Severe depression/ anxiety       HPI:  HPI  Past Medical History:   Diagnosis Date    Acute systolic CHF (congestive heart failure) ([address] 7/16/2015    Alcohol abuse     stopped drinking since 2012    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    Anxiety disorder     Arthritis     Atrial fibrillation (HCC)     CAD (coronary artery disease) 3/25/2014    s/p CABG in may 2014    Cardiomyopathy ([address]     Chronic kidney disease     Depression     Diabetes mellitus ([address]     Drug abuse ([address]     hx of cacaine abuse, stopped abusing drugs in 2012    GERD (gastroesophageal reflux disease)     H/O cardiac catheterization 4/30/2014    Hyperlipidemia     Hypertension     Intradural extramedullary spinal tumor     Lumbar spine tumor     Medtronic dual icd      Neuromuscular disorder ([address]     Other disorders of kidney and ureter in diseases classified elsewhere     Paroxysmal atrial fibrillation ([address] 7/22/2014    V tach ([address]     V-tach [facility])     s/p ablation in dec 2014      Past Surgical History:   Procedure Laterality Date    CARDIAC CATHETERIZATION  3/20/14     [facility]    CARDIAC DEFIBRILLATOR PLACEMENT  10/13/2015    [employer] [facility], MRI CONDITIONAL ICD    CORONARY ARTERY BYPASS GRAFT  5-9-14    3 bypass    EKG 12-LEAD  7/17/2015         OTHER SURGICAL HISTORY Left 10/21/14    Left Bursectomy, I & D Left Elbow - Dr. Judy Wilcox LAMINECTOMY,>2 [facility] N/A 1/16/2018    LUMBAR AND SACRAL LAMINECTOMY, REMOVAL OF INTRASPINAL TUMORS performed by Marie Neumann MD at [address] [place] [address] harvest from legs     Family History   Problem Relation Age of Onset    Diabetes Mother     High Blood Pressure Mother     Stroke Mother     Diabetes Father     Heart Disease Father     High Blood Pressure Father     Heart Disease Sister         CABG    Diabetes Maternal Grandmother     Diabetes Maternal Grandfather     Heart Disease Paternal Grandfather      Social History     Tobacco Use    Smoking status: Some Days     Packs/day: 0.50     Years: 40.00     Pack years: 20.00     Types: Cigarettes     Start date: 7/16/1980    Smokeless tobacco: Current     Types: Snuff   Substance Use Topics    Alcohol use:  Yes     Alcohol/week: 0.0 standard drinks     Comment: states drinking \"sometimes\"      Current Outpatient Medications   Medication Sig Dispense Refill    insulin glargine (LANTUS SOLOSTAR) 100 UNIT/ML injection pen Inject 45 Units into the skin nightly 15 pen 3    escitalopram (LEXAPRO) 10 MG tablet Take 1 tablet by mouth daily 30 tablet 1    nicotine (NICODERM CQ) 21 MG/24HR Place 1 patch onto the skin daily 42 patch 0    nicotine polacrilex (COMMIT) 2 MG lozenge Take 1 lozenge by mouth as needed for Smoking cessation 100 each 3    hydrALAZINE (APRESOLINE) 100 MG tablet TAKE 1 TABLET BY MOUTH THREE TIMES DAILY 270 tablet 3    atorvastatin (LIPITOR) 80 MG tablet TAKE 1 TABLET BY MOUTH EVERY NIGHT 30 tablet 5    BRILINTA 90 MG TABS tablet TAKE 1 TABLET BY MOUTH TWICE DAILY 180 tablet 0    blood glucose test strips (ONETOUCH ULTRA) strip USE AS DIRECTED TWICE DAILY 200 strip 3    ALPRAZolam (XANAX) 0.5 MG tablet TAKE 1 TABLET BY MOUTH AT BEDTIME FOR ANXIETY 30 tablet 5    warfarin (COUMADIN) 5 MG tablet USE AS DIRECTED BY COUMADIN CLINIC 180 tablet 5    isosorbide mononitrate (IMDUR) 120 MG extended release tablet Take 1 tablet by mouth daily 90 tablet 3    metoprolol succinate (TOPROL XL) 100 MG extended release tablet Take 1 tablet by mouth daily 90 tablet 3    mexiletine (MEXITIL) 150 MG capsule TAKE 2 CAPSULES THREE TIMES DAILY FOR ATRIAL FIBRILLATION 540 capsule 3    ONE TOUCH ULTRASOFT LANCETS MISC USE AS DIRECTED TWICE DAILY. 200 each 3    doxazosin (CARDURA) 2 MG tablet Take 1 tablet by mouth daily 90 tablet 3    Blood Glucose Monitoring Suppl (ONE TOUCH ULTRA 2) w/Device KIT 1 kit by Does not apply route 2 times daily 1 kit 0    Insulin Pen Needle 31G X 8 MM MISC 1 each by Does not apply route daily 100 each 3    linagliptin (TRADJENTA) 5 MG tablet TAKE 1 TABLET BY MOUTH DAILY 90 tablet 3    potassium chloride (KLOR-CON M) 20 MEQ extended release tablet Take 1 tablet by mouth daily 90 tablet 3    amiodarone (CORDARONE) 200 MG tablet Take 1 tablet by mouth daily 90 tablet 3    losartan (COZAAR) 50 MG tablet TAKE 1 TABLET BY MOUTH DAILY 30 tablet 0    bumetanide (BUMEX) 2 MG tablet TAKE 1 TABLET BY MOUTH DAILY 90 tablet 3    nitroGLYCERIN (NITROSTAT) 0.4 MG SL tablet Place 1 tablet under the tongue every 5 minutes as needed for Chest pain X 3 doses. If chest pain continues seek medical attention 25 tablet 3    glucose blood VI test strips (PHIL CONTOUR TEST) strip 1 each by In Vitro route daily 300 each 3    acetaminophen 650 MG TABS Take 650 mg by mouth every 4 hours as needed 120 tablet 3     No current facility-administered medications for this visit.      Allergies   Allergen Reactions    Latex     Pcn [Penicillins] Hives    Zoloft [Sertraline Hcl] Anxiety     Worsened anxiety     Health Maintenance   Topic Date Due    COVID-19 Vaccine (1) Never done    Pneumococcal 0-64 years Vaccine (1 - PCV) Never done    Hepatitis B vaccine (1 of 3 - Risk 3-dose series) Never done    Colorectal Cancer Screen  Never done    Low dose CT lung screening  Never done    Diabetic retinal exam  12/15/2016    Flu vaccine (1) 09/01/2022    Diabetic foot exam  10/06/2022    Prostate Specific Antigen (PSA) Screening or Monitoring  11/12/2022    Lipids  04/20/2023    A1C test (Diabetic or Prediabetic)  07/05/2023    Depression Monitoring  07/12/2023    DTaP/Tdap/Td vaccine (2 - Td or Tdap) 06/25/2027    Hepatitis C screen  Completed    HIV screen  Completed    Hepatitis A vaccine  Aged Out    Hib vaccine  Aged Out    Meningococcal (ACWY) vaccine  Aged Out       Subjective:  Review of Systems  General:   No fever, no chills,some fatigue or weight loss  Pulmonary:    some dyspnea, no wheezing  Cardiac:    Denies recent chest pain,   GI:     No nausea or vomiting, no abdominal pain  Neuro:     No dizziness or light headedness,   Musculoskeletal:  No recent active issues  Extremities:   No edema, no obvious claudication     Objective:  Physical Exam  BP (!) 155/86   Pulse 61   Ht 6' 2\" (1.88 m)   Wt 278 lb 12.8 oz (126.5 kg)   BMI 35.80 kg/m²   General:   Well developed, well nourished  Lungs:    Clear to auscultation  Heart:    Normal S1 S2, Slight murmur. no rubs, no gallops  Abdomen:   Soft, non tender, no organomegalies, positive bowel sounds  Extremities:   No edema, no cyanosis, good peripheral pulses  Neurological:   Awake, alert, oriented. No obvious focal deficits  Musculoskelatal:  No obvious deformities    Assessment:      Diagnosis Orders   1. Essential hypertension  EKG 12 Lead      2. Chest pain, unspecified type  EKG 12 Lead      3. Coronary artery disease involving coronary bypass graft of native heart with angina pectoris (HCC)        4. Paroxysmal atrial fibrillation ([facility]        As above  Multiple issues  Higher risk patient   Lots of anxiety   Angina   ECG in office was done today. I reviewed the ECG.  No acute findings      Plan:  No follow-ups on file.  Discussed  Add renexa  See PCP to discuss his anxiety issues  Continue risk factor modification and medical management  Thank you for allowing me to participate in the care of your patient. Please don't hesitate to contact me regarding any further issues related to the patient care    Orders Placed:  Orders Placed This Encounter   Procedures    EKG 12 Lead     Order Specific Question:   Reason for Exam?     Answer: Other       Medications Prescribed:  No orders of the defined types were placed in this encounter. Discussed use, benefit, and side effects of prescribed medications. All patient questions answered. Pt voicedunderstanding. Instructed to continue current medications, diet and exercise. Continue risk factor modification and medical management. Patient agreed with treatment plan. Follow up as directed.     Electronically signedby Hansel Zeng MD on 7/18/2022 at 1:11 PM

## 2022-07-19 ENCOUNTER — CARE COORDINATION (OUTPATIENT)
Dept: CASE MANAGEMENT | Age: 57
End: 2022-07-19

## 2022-07-20 ENCOUNTER — OFFICE VISIT (OUTPATIENT)
Dept: NEPHROLOGY | Age: 57
End: 2022-07-20
Payer: COMMERCIAL

## 2022-07-20 VITALS
SYSTOLIC BLOOD PRESSURE: 188 MMHG | DIASTOLIC BLOOD PRESSURE: 93 MMHG | OXYGEN SATURATION: 97 % | TEMPERATURE: 97.6 F | HEIGHT: 74 IN | HEART RATE: 53 BPM | BODY MASS INDEX: 36.96 KG/M2 | WEIGHT: 288 LBS

## 2022-07-20 DIAGNOSIS — N18.4 CKD (CHRONIC KIDNEY DISEASE) STAGE 4, GFR 15-29 ML/MIN (HCC): ICD-10-CM

## 2022-07-20 DIAGNOSIS — N28.89 RENAL MASS: Primary | ICD-10-CM

## 2022-07-20 DIAGNOSIS — E11.21 DIABETIC NEPHROPATHY ASSOCIATED WITH TYPE 2 DIABETES MELLITUS (HCC): ICD-10-CM

## 2022-07-20 DIAGNOSIS — I10 HTN (HYPERTENSION), BENIGN: ICD-10-CM

## 2022-07-20 PROCEDURE — 3044F HG A1C LEVEL LT 7.0%: CPT | Performed by: INTERNAL MEDICINE

## 2022-07-20 PROCEDURE — 99213 OFFICE O/P EST LOW 20 MIN: CPT | Performed by: INTERNAL MEDICINE

## 2022-07-20 RX ORDER — DOXAZOSIN MESYLATE 4 MG/1
4 TABLET ORAL DAILY
Qty: 90 TABLET | Refills: 1 | Status: SHIPPED | OUTPATIENT
Start: 2022-07-20

## 2022-07-20 NOTE — PROGRESS NOTES
[facility]        Outpatient Follow-Up note         7/20/2022 2:43 PM    Patient Name:   Franc Sanchez:    1965  Primary Care Physician:  ISABELLA Hurtado CNP     Chief Complaint / Reason for follow-up : Follow Up of CKD      Interval History :  Patient seen and examined by me. No cp or SOB. Patient was recently in the hospital for congestive heart failure.   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    Anxiety disorder     Arthritis     Atrial fibrillation (HCC)     CAD (coronary artery disease) 3/25/2014    s/p CABG in may 2014    Cardiomyopathy ([address]     Chronic kidney disease     Depression     Diabetes mellitus ([address]     Drug abuse ([address]     hx of cacaine abuse, stopped abusing drugs in 2012    GERD (gastroesophageal reflux disease)     H/O cardiac catheterization 4/30/2014    Hyperlipidemia     Hypertension     Intradural extramedullary spinal tumor     Lumbar spine tumor     Medtronic dual icd      Neuromuscular disorder ([address]     Other disorders of kidney and ureter in diseases classified elsewhere     Paroxysmal atrial fibrillation ([address] 7/22/2014    V tach ([address]     V-tach [facility])     s/p ablation in dec 2014     Past Surgical History:   Procedure Laterality Date    CARDIAC CATHETERIZATION  3/20/14     [facility]    CARDIAC DEFIBRILLATOR PLACEMENT  10/13/2015    [employer]TR[employer] [place], MRI CONDITIONAL ICD    CORONARY ARTERY BYPASS GRAFT  5-9-14    3 bypass    EKG 12-LEAD  7/17/2015         OTHER SURGICAL HISTORY Left 10/21/14    Left Bursectomy, I & D Left Elbow - Dr. Genesis Cruz LAMINECTOMY,>2 [facility] N/A 1/16/2018    LUMBAR AND SACRAL LAMINECTOMY, REMOVAL OF INTRASPINAL TUMORS performed by Debbie Friedman MD at [facility] OR    VASCULAR SURGERY      cabg harvest from legs        Medications :     Outpatient Medications Marked as Taking for the 7/20/22 encounter (Office Visit) with Shayla Clinton MD   Medication Sig Dispense Refill    insulin glargine (LANTUS SOLOSTAR) 100 UNIT/ML injection pen Inject 45 Units into the skin nightly 15 pen 3    nicotine polacrilex (COMMIT) 2 MG lozenge Take 1 lozenge by mouth as needed for Smoking cessation 100 each 3    hydrALAZINE (APRESOLINE) 100 MG tablet TAKE 1 TABLET BY MOUTH THREE TIMES DAILY 270 tablet 3    atorvastatin (LIPITOR) 80 MG tablet TAKE 1 TABLET BY MOUTH EVERY NIGHT 30 tablet 5    BRILINTA 90 MG TABS tablet TAKE 1 TABLET BY MOUTH TWICE DAILY 180 tablet 0    blood glucose test strips (ONETOUCH ULTRA) strip USE AS DIRECTED TWICE DAILY 200 strip 3    ALPRAZolam (XANAX) 0.5 MG tablet TAKE 1 TABLET BY MOUTH AT BEDTIME FOR ANXIETY 30 tablet 5    warfarin (COUMADIN) 5 MG tablet USE AS DIRECTED BY COUMADIN CLINIC 180 tablet 5    isosorbide mononitrate (IMDUR) 120 MG extended release tablet Take 1 tablet by mouth daily 90 tablet 3    metoprolol succinate (TOPROL XL) 100 MG extended release tablet Take 1 tablet by mouth daily 90 tablet 3    mexiletine (MEXITIL) 150 MG capsule TAKE 2 CAPSULES THREE TIMES DAILY FOR ATRIAL FIBRILLATION 540 capsule 3    ONE TOUCH ULTRASOFT LANCETS MISC USE AS DIRECTED TWICE DAILY.  200 each 3    doxazosin (CARDURA) 2 MG tablet Take 1 tablet by mouth daily 90 tablet 3    Blood Glucose Monitoring Suppl (ONE TOUCH ULTRA 2) w/Device KIT 1 kit by Does not apply route 2 times daily 1 kit 0    Insulin Pen Needle 31G X 8 MM MISC 1 each by Does not apply route daily 100 each 3    linagliptin (TRADJENTA) 5 MG tablet TAKE 1 TABLET BY MOUTH DAILY 90 tablet 3    potassium chloride (KLOR-CON M) 20 MEQ extended release tablet Take 1 tablet by mouth daily 90 tablet 3    amiodarone (CORDARONE) 200 MG tablet Take 1 tablet by mouth daily 90 tablet 3    losartan (COZAAR) 50 MG tablet TAKE 1 TABLET BY MOUTH DAILY 30 tablet 0    bumetanide (BUMEX) 2 MG tablet TAKE 1 TABLET BY MOUTH DAILY 90 tablet 3    glucose blood VI test strips (PHIL CONTOUR TEST) strip 1 each by In Vitro route daily 300 each 3    acetaminophen 650 MG TABS Take 650 mg by mouth every 4 hours as needed 120 tablet 3       Vitals     BP (!) 188/93 (Site: Left Lower Arm, Position: Sitting, Cuff Size: Small Adult)   Pulse 53   Temp 97.6 °F (36.4 °C)   Ht 6' 2\" (1.88 m)   Wt 288 lb (130.6 kg)   SpO2 97%   BMI 36.98 kg/m²  Wt Readings from Last 3 Encounters:   07/20/22 288 lb (130.6 kg)   07/18/22 278 lb 12.8 oz (126.5 kg)   07/12/22 282 lb 1.6 oz (128 kg)        Physical Exam     General -- no distress  Lungs -- clear  Heart -- S1, S2 heard, JVD - no  Abdomen - soft, non-tender  Extremities -- no edema  CNS - awake and alert    Labs, Radiology and Tests    Labs -    5/18 9/18 11/19 6/20 1/22 4/22 7/22     Potassium 4.4 4.1 4.1 4.1 4.7 4.6 4.7     BUN 27 27 29 22 42 39 38     Creatinine 1.4 1.8 1.7 1.6 2.7 2.5 2.6     eGFR 53 40 42 45 25 27 26                 UPCR +(UA) 1.3 6.03 2.5 3.61       UMCR 1.5 854 4168 1776 2512 2715                    4/22 - pth 169, Hb 11.2, Vitamin d - 17  7/22 - pth 129, vitamin -d  19    Serologies -- 9/18  SRIDEVI -- negative  Hepatitis B -- negative  Hepatitis C -- negative  Complements C3 / C4 -- normal  Kappa / Lambda Ratio -- 3.22/2.89-1.11  Serum Protein Electropharesis -- normal pattern      Renal Ultrasound Scan --1/22  Right kidney 12.3 cm left kidney 4.2 cm  Complex lesion in the left kidney, measuring around 3 cm     CT scan of the abdomen - 5/18  1. Findings of constipation. Findings suggestive of cystitis. 2. Moderate bilateral perinephric stranding that has worsened since the study, suggesting possibility of acute bilateral pyelonephritis. Clinical correlation recommended.      Assessment    Renal - patient has CKD stage III most likely secondary to diabetic nephropathy and essential hypertension.  -Overall renal function appears to be stable within the last 6 months.  -Still has significant proteinuria    essential hypertension running slightly high, increase Cardura to 4 mg daily  Proteinuria secondary to diabetes and serologic workup is negative. Diabetes mellitus   BPH on Flomax is urology   Complex renal mass seen by urology  meds reviewed and D/W patient  FU in 4 months    Tests and orders placed this Encounter     No orders of the defined types were placed in this encounter. Kirby Clinton M.D  [facility].

## 2022-07-25 ENCOUNTER — CARE COORDINATION (OUTPATIENT)
Dept: CASE MANAGEMENT | Age: 57
End: 2022-07-25

## 2022-07-25 NOTE — CARE COORDINATION
Care Transitions Outreach Attempt-2nd attempt    Call within 2 business days of discharge: Yes   Attempted to reach patient for subsequent transitional call. VM left to return call to [phone]. If no return call, CTN will sign off-2nd attempt. Patient: Socrates Henderson Patient : 1965 MRN: [id number]    Last Discharge [facility]       Date Complaint Diagnosis Description Type Department Provider    22 Chest Pain; Alcohol Intoxication Chest pain, unspecified type . .. ED to Hosp-Admission (Discharged) (ADMITTED) URSULA 3B Salma Ludwig MD; Marlin Lorenz...               Noted following upcoming appointments from discharge chart review:   [facility] follow up appointment(s):   Future Appointments   Date Time Provider Tate Hunt   2022  3:30 PM ISABELLA Munoz   10/3/2022  1:00 PM Joey Will MD N [facility] [place]   2022  3:40 PM MD ROSA [facility]. [facility] [place]   2023  2:00 PM SCHEDULE, [facility] PACER NURSE N [facility] PACER [facility] [place]     Non-[facility] follow up appointment(s): nicole

## 2022-07-29 ENCOUNTER — TELEPHONE (OUTPATIENT)
Dept: CARDIOLOGY CLINIC | Age: 57
End: 2022-07-29

## 2022-07-29 NOTE — LETTER
[address] [place] [address]  Phone: [phone]  Fax: [phone]        July 29, 2022    [address] [place] [address] [address] [address]      Dear Carmen Barrera: Mary Caballero We have been unable to contact you by phone. Our office did not receive your Carelink transmission which was scheduled for July 26, 2022     Please check the connections and send a manual download ASAP. If you need help sending a download, please call [employer] at [phone]. Our office is not responsible for missed transmissions. Please call our office at [phone] if you have questions for the pacemaker/ICD clinic     Thank You for your assistance!     [facility] Pacemaker/ICD clinic

## 2022-08-10 RX ORDER — BUMETANIDE 2 MG/1
2 TABLET ORAL DAILY
Qty: 90 TABLET | Refills: 3 | Status: SHIPPED | OUTPATIENT
Start: 2022-08-10

## 2022-08-10 NOTE — TELEPHONE ENCOUNTER
Krysten Guillermo called requesting a refill on the following medications:  Requested Prescriptions     Pending Prescriptions Disp Refills    bumetanide (BUMEX) 2 MG tablet 90 tablet 3     Sig: Take 1 tablet by mouth in the morning. Pharmacy verified: [facility] on [address], [address]  . pv      Date of last visit: 7/18/2022  Date of next visit (if applicable): 55/0/0334

## 2022-08-22 RX ORDER — TICAGRELOR 90 MG/1
TABLET ORAL
Qty: 180 TABLET | Refills: 0 | Status: SHIPPED | OUTPATIENT
Start: 2022-08-22

## 2022-08-25 NOTE — TELEPHONE ENCOUNTER
Encounter addended by: Maria Del Carmen Reid on: 8/25/2022 10:58 AM   Actions taken: Imaging Exam begun He can continue Lasix 40 mg daily.  Thank you

## 2022-09-05 DIAGNOSIS — F41.3 OTHER MIXED ANXIETY DISORDERS: ICD-10-CM

## 2022-09-06 RX ORDER — ESCITALOPRAM OXALATE 10 MG/1
10 TABLET ORAL DAILY
Qty: 90 TABLET | Refills: 1 | Status: SHIPPED | OUTPATIENT
Start: 2022-09-06 | End: 2022-11-15

## 2022-09-07 ENCOUNTER — TELEPHONE (OUTPATIENT)
Dept: CARDIOLOGY CLINIC | Age: 57
End: 2022-09-07

## 2022-09-07 NOTE — TELEPHONE ENCOUNTER
Missed download from device. Last checked 5/17/22. Pt scheduled monthly. Called patient and LMOM. Was getting new cell stick from [employer]tronic as of 8/9/22. Did he get his new cell stick?

## 2022-10-01 DIAGNOSIS — E11.22 CONTROLLED TYPE 2 DIABETES MELLITUS WITH CHRONIC KIDNEY DISEASE, WITHOUT LONG-TERM CURRENT USE OF INSULIN, UNSPECIFIED CKD STAGE (HCC): ICD-10-CM

## 2022-10-03 ENCOUNTER — OFFICE VISIT (OUTPATIENT)
Dept: CARDIOLOGY CLINIC | Age: 57
End: 2022-10-03
Payer: COMMERCIAL

## 2022-10-03 ENCOUNTER — NURSE ONLY (OUTPATIENT)
Dept: CARDIOLOGY CLINIC | Age: 57
End: 2022-10-03
Payer: COMMERCIAL

## 2022-10-03 VITALS
SYSTOLIC BLOOD PRESSURE: 158 MMHG | HEART RATE: 74 BPM | DIASTOLIC BLOOD PRESSURE: 80 MMHG | HEIGHT: 74 IN | WEIGHT: 286.8 LBS | BODY MASS INDEX: 36.81 KG/M2

## 2022-10-03 DIAGNOSIS — Z95.810 S/P ICD (INTERNAL CARDIAC DEFIBRILLATOR) PROCEDURE: Primary | ICD-10-CM

## 2022-10-03 DIAGNOSIS — I48.21 PERMANENT ATRIAL FIBRILLATION (HCC): ICD-10-CM

## 2022-10-03 DIAGNOSIS — I25.810 CORONARY ARTERY DISEASE INVOLVING CORONARY BYPASS GRAFT OF NATIVE HEART WITHOUT ANGINA PECTORIS: Primary | ICD-10-CM

## 2022-10-03 DIAGNOSIS — I25.5 ISCHEMIC CARDIOMYOPATHY: ICD-10-CM

## 2022-10-03 DIAGNOSIS — I10 PRIMARY HYPERTENSION: ICD-10-CM

## 2022-10-03 PROCEDURE — 99214 OFFICE O/P EST MOD 30 MIN: CPT | Performed by: NUCLEAR MEDICINE

## 2022-10-03 PROCEDURE — 93283 PRGRMG EVAL IMPLANTABLE DFB: CPT | Performed by: NUCLEAR MEDICINE

## 2022-10-03 RX ORDER — POTASSIUM CHLORIDE 20 MEQ/1
20 TABLET, EXTENDED RELEASE ORAL DAILY
Qty: 90 TABLET | Refills: 3 | Status: SHIPPED | OUTPATIENT
Start: 2022-10-03

## 2022-10-03 NOTE — PROGRESS NOTES
[address] [address]  [address]  [facility] [address]  Dept: [phone]  Dept Fax: [phone]  Loc: [phone]    Visit Date: 10/3/2022    Alexis Champagne is a 62 y.o. male who presents todayfor:  Chief Complaint   Patient presents with    6 Month Follow-Up    Hypertension    Coronary Artery Disease    Cardiomyopathy    Atrial Fibrillation   No more admissions  Alcohol use is better  Know CABG and CMP   As well ICD  No chest pain   Weight issues  No chest pain lately   Baseline dyspnea  A fib is stable   No ICD shocks  BP is stable  No dizziness  No syncope  On statins for hyperlipidemia  No bleeding         HPI:  HPI  Past Medical History:   Diagnosis Date    Acute systolic CHF (congestive heart failure) ([address] 7/16/2015    Alcohol abuse     stopped drinking since 2012    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    Anxiety disorder     Arthritis     Atrial fibrillation (HCC)     CAD (coronary artery disease) 3/25/2014    s/p CABG in may 2014    Cardiomyopathy ([address]     Chronic kidney disease     Depression     Diabetes mellitus ([address]     Drug abuse ([address]     hx of cacaine abuse, stopped abusing drugs in 2012    GERD (gastroesophageal reflux disease)     H/O cardiac catheterization 4/30/2014    Hyperlipidemia     Hypertension     Intradural extramedullary spinal tumor     Lumbar spine tumor     Medtronic dual icd      Neuromuscular disorder ([address]     Other disorders of kidney and ureter in diseases classified elsewhere     Paroxysmal atrial fibrillation ([address] 7/22/2014    V tach     V-tach     s/p ablation in dec 2014      Past Surgical History:   Procedure Laterality Date    CARDIAC CATHETERIZATION  3/20/14     [facility]    CARDIAC DEFIBRILLATOR PLACEMENT  10/13/2015    [employer] [place], MRI CONDITIONAL ICD    CORONARY ARTERY BYPASS GRAFT  5-9-14    3 bypass    EKG 12-LEAD  7/17/2015         OTHER SURGICAL HISTORY Left 10/21/14    Left Bursectomy, I & D Left Elbow - Dr. Rosalinda [facility]2018    LUMBAR AND SACRAL LAMINECTOMY, REMOVAL OF INTRASPINAL TUMORS performed by Kerrie Sandra MD at [address] [place] [address] harvest from legs     Family History   Problem Relation Age of Onset    Diabetes Mother     High Blood Pressure Mother     Stroke Mother     Diabetes Father     Heart Disease Father     High Blood Pressure Father     Heart Disease Sister         CABG    Diabetes Maternal Grandmother     Diabetes Maternal Grandfather     Heart Disease Paternal Grandfather      Social History     Tobacco Use    Smoking status: Some Days     Packs/day: 0.50     Years: 40.00     Pack years: 20.00     Types: Cigarettes     Start date: 7/16/1980    Smokeless tobacco: Current     Types: Snuff   Substance Use Topics    Alcohol use: Yes     Alcohol/week: 0.0 standard drinks     Comment: states drinking \"sometimes\"      Current Outpatient Medications   Medication Sig Dispense Refill    linagliptin (TRADJENTA) 5 MG tablet TAKE 1 TABLET BY MOUTH DAILY 90 tablet 3    potassium chloride (KLOR-CON M) 20 MEQ extended release tablet TAKE 1 TABLET BY MOUTH DAILY 90 tablet 3    escitalopram (LEXAPRO) 10 MG tablet TAKE 1 TABLET BY MOUTH DAILY 90 tablet 1    BRILINTA 90 MG TABS tablet TAKE 1 TABLET BY MOUTH TWICE DAILY 180 tablet 0    bumetanide (BUMEX) 2 MG tablet Take 1 tablet by mouth in the morning. 90 tablet 3    doxazosin (CARDURA) 4 MG tablet Take 1 tablet by mouth in the morning. 90 tablet 1    ranolazine (RANEXA) 500 MG extended release tablet Take 1 tablet by mouth in the morning and 1 tablet before bedtime.  180 tablet 3    insulin glargine (LANTUS SOLOSTAR) 100 UNIT/ML injection pen Inject 45 Units into the skin nightly 15 pen 3    nicotine (NICODERM CQ) 21 MG/24HR Place 1 patch onto the skin daily 42 patch 0    nicotine polacrilex (COMMIT) 2 MG lozenge Take 1 lozenge by mouth as needed for Smoking cessation 100 each 3    hydrALAZINE (APRESOLINE) 100 MG tablet TAKE 1 TABLET BY MOUTH THREE TIMES DAILY 270 tablet 3    atorvastatin (LIPITOR) 80 MG tablet TAKE 1 TABLET BY MOUTH EVERY NIGHT 30 tablet 5    blood glucose test strips (ONETOUCH ULTRA) strip USE AS DIRECTED TWICE DAILY 200 strip 3    ALPRAZolam (XANAX) 0.5 MG tablet TAKE 1 TABLET BY MOUTH AT BEDTIME FOR ANXIETY 30 tablet 5    warfarin (COUMADIN) 5 MG tablet USE AS DIRECTED BY COUMADIN CLINIC 180 tablet 5    isosorbide mononitrate (IMDUR) 120 MG extended release tablet Take 1 tablet by mouth daily 90 tablet 3    metoprolol succinate (TOPROL XL) 100 MG extended release tablet Take 1 tablet by mouth daily 90 tablet 3    mexiletine (MEXITIL) 150 MG capsule TAKE 2 CAPSULES THREE TIMES DAILY FOR ATRIAL FIBRILLATION 540 capsule 3    ONE TOUCH ULTRASOFT LANCETS MISC USE AS DIRECTED TWICE DAILY. 200 each 3    Blood Glucose Monitoring Suppl (ONE TOUCH ULTRA 2) w/Device KIT 1 kit by Does not apply route 2 times daily 1 kit 0    Insulin Pen Needle 31G X 8 MM MISC 1 each by Does not apply route daily 100 each 3    amiodarone (CORDARONE) 200 MG tablet Take 1 tablet by mouth daily 90 tablet 3    losartan (COZAAR) 50 MG tablet TAKE 1 TABLET BY MOUTH DAILY 30 tablet 0    glucose blood VI test strips (PHIL CONTOUR TEST) strip 1 each by In Vitro route daily 300 each 3    acetaminophen 650 MG TABS Take 650 mg by mouth every 4 hours as needed 120 tablet 3     No current facility-administered medications for this visit.      Allergies   Allergen Reactions    Latex     Pcn [Penicillins] Hives    Zoloft [Sertraline Hcl] Anxiety     Worsened anxiety     Health Maintenance   Topic Date Due    COVID-19 Vaccine (1) Never done    Pneumococcal 0-64 years Vaccine (1 - PCV) Never done    Colorectal Cancer Screen  Never done    Low dose CT lung screening  Never done    Diabetic retinal exam  12/15/2016    Flu vaccine (1) Never done    Diabetic foot exam  10/06/2022    Lipids 04/20/2023    A1C test (Diabetic or Prediabetic)  07/05/2023    Depression Monitoring  07/12/2023    DTaP/Tdap/Td vaccine (2 - Td or Tdap) 06/25/2027    Hepatitis C screen  Completed    HIV screen  Completed    Hepatitis A vaccine  Aged Out    Hepatitis B vaccine  Aged Out    Hib vaccine  Aged Out    Meningococcal (ACWY) vaccine  Aged Out       Subjective:  Review of Systems  General:   No fever, no chills, some fatigue or weight loss  Pulmonary:    some dyspnea, no wheezing  Cardiac:    Denies recent chest pain,   GI:     No nausea or vomiting, no abdominal pain  Neuro:     No dizziness or light headedness,   Musculoskeletal:  No recent active issues  Extremities:   No edema, no obvious claudication     Objective:  Physical Exam  BP (!) 158/80   Pulse 74   Ht 6' 2\" (1.88 m)   Wt 286 lb 12.8 oz (130.1 kg)   BMI 36.82 kg/m²   General:   Well developed, well nourished  Lungs:    Clear to auscultation  Heart:    Normal S1 S2, Slight murmur. no rubs, no gallops  Abdomen:   Soft, non tender, no organomegalies, positive bowel sounds  Extremities:   No edema, no cyanosis, good peripheral pulses  Neurological:   Awake, alert, oriented. No obvious focal deficits  Musculoskelatal:  No obvious deformities    Assessment:      Diagnosis Orders   1. Coronary artery disease involving coronary bypass graft of native heart without angina pectoris        2. Ischemic cardiomyopathy        3. Permanent atrial fibrillation ([address]        4. Primary hypertension        As above  Cardiac seems stale  Multiple chronic issues  Depression is so so       Plan:  No follow-ups on file. As above  Monitor the BP  Seems stable for now  Smoking: discussed with the patient the importance of smoke cessation especially with the risk of CAD. Continue risk factor modification and medical management  Thank you for allowing me to participate in the care of your patient.  Please don't hesitate to contact me regarding any further issues related to the patient care    Orders Placed:  No orders of the defined types were placed in this encounter. Medications Prescribed:  No orders of the defined types were placed in this encounter. Discussed use, benefit, and side effects of prescribed medications. All patient questions answered. Pt voicedunderstanding. Instructed to continue current medications, diet and exercise. Continue risk factor modification and medical management. Patient agreed with treatment plan. Follow up as directed.     Electronically signedby Elias Mcclellan MD on 10/3/2022 at 1:01 PM

## 2022-10-03 NOTE — PROGRESS NOTES
In Office Medtronic Dual ICD   Pt of Nette Grady - here seeing in office     Battery 1.9 years     Presenting rhythm AR VS    A Impedance 437  RV Impedance 380    RV shock 58    P wave sensing 1.1  R wave sensing 6.6    A Threshold - @ -  A Amplitude 2.0 @ 0.40  RV Thresholds 0.75 @ 0.40  RV Amplitude 1.5 @ 0.40    A Paced 1.8%  V Paced <0.1%    Programmed Mode DDI 40    Afib Reed 0%    Episodes: none    Optivol WNL

## 2022-10-24 ENCOUNTER — TELEPHONE (OUTPATIENT)
Dept: FAMILY MEDICINE CLINIC | Age: 57
End: 2022-10-24

## 2022-10-24 DIAGNOSIS — F41.3 OTHER MIXED ANXIETY DISORDERS: ICD-10-CM

## 2022-10-24 RX ORDER — ALPRAZOLAM 0.5 MG/1
TABLET ORAL
Qty: 30 TABLET | Refills: 5 | Status: SHIPPED | OUTPATIENT
Start: 2022-10-24 | End: 2023-04-22

## 2022-10-24 NOTE — TELEPHONE ENCOUNTER
----- Message from Yobany Carpenter sent at 10/24/2022 11:54 AM EDT -----  Subject: Refill Request    QUESTIONS  Name of Medication? ALPRAZolam (XANAX) 0.5 MG tablet  Patient-reported dosage and instructions? TAKE 1 TABLET BY MOUTH AT   BEDTIME FOR ANXIETY  How many days do you have left? 0  Preferred Pharmacy? [id number]  Pharmacy phone number (if available)? [phone]  ---------------------------------------------------------------------------  --------------  CALL BACK INFO  What is the best way for the office to contact you? OK to leave message on   voicemail  Preferred Call Back Phone Number? [phone]  ---------------------------------------------------------------------------  --------------  SCRIPT ANSWERS  Relationship to Patient?  Self

## 2022-11-02 PROCEDURE — G2066 INTER DEVC REMOTE 30D: HCPCS | Performed by: NUCLEAR MEDICINE

## 2022-11-02 PROCEDURE — 93297 REM INTERROG DEV EVAL ICPMS: CPT | Performed by: NUCLEAR MEDICINE

## 2022-11-03 ENCOUNTER — TELEPHONE (OUTPATIENT)
Dept: CARDIOLOGY CLINIC | Age: 57
End: 2022-11-03

## 2022-11-03 ENCOUNTER — PROCEDURE VISIT (OUTPATIENT)
Dept: CARDIOLOGY CLINIC | Age: 57
End: 2022-11-03

## 2022-11-03 ENCOUNTER — PROCEDURE VISIT (OUTPATIENT)
Dept: CARDIOLOGY CLINIC | Age: 57
End: 2022-11-03
Payer: COMMERCIAL

## 2022-11-03 DIAGNOSIS — I42.0 DILATED CARDIOMYOPATHY (HCC): Primary | ICD-10-CM

## 2022-11-03 DIAGNOSIS — I50.43 ACUTE ON CHRONIC COMBINED SYSTOLIC AND DIASTOLIC CONGESTIVE HEART FAILURE (HCC): Primary | ICD-10-CM

## 2022-11-03 NOTE — TELEPHONE ENCOUNTER
Carelink medtronic dual ICD     Recurrent AF on 10/30/22  [facility] w/ Darlineasif Quarleser, was he aware he's back in AF? Assessment And Recommendations: The patient is in sinus rhythm today. Reports feeling well. He has indication for antiarrhythmic therapy because of history of VT ablation. However, has remained VT free following his ablation in 2014. Discussed management options of his atrial fibrillation including continuation of antiarrhythmic therapy versus catheter ablation with hopes of getting him off amiodarone and possibly mexiletine. He has a trip coming up in June and would like to continue antiarrhythmic therapy at this time. We will continue following him remotely. If he has recurrences will proceed with A. fib ablation. Patient is in agreement. Thank you for allowing me to participate in the care of your patient. Please call me if you have any questions. **This report has been created using voice recognition software. It may contain minor errors which are inherent in voice recognition technology. **         Minnie Qiu MD, [facility], [facility], [facility] on 5/11/2022 at 4:05 PM

## 2022-11-03 NOTE — PROGRESS NOTES
Carelink medtronic optivol     1.9 years on device   Looks like he is back in AF on 10/30/22, will call pt and notify Elizabeth   Optivol WNl                                  Assessment And Recommendations: The patient is in sinus rhythm today. Reports feeling well. He has indication for antiarrhythmic therapy because of history of VT ablation. However, has remained VT free following his ablation in 2014. Discussed management options of his atrial fibrillation including continuation of antiarrhythmic therapy versus catheter ablation with hopes of getting him off amiodarone and possibly mexiletine. He has a trip coming up in June and would like to continue antiarrhythmic therapy at this time. We will continue following him remotely. If he has recurrences will proceed with A. fib ablation. Patient is in agreement. Thank you for allowing me to participate in the care of your patient. Please call me if you have any questions. **This report has been created using voice recognition software. It may contain minor errors which are inherent in voice recognition technology. **         Erinn Santana MD, [facility], [facility], [facility] on 5/11/2022 at 4:05 PM

## 2022-11-04 NOTE — TELEPHONE ENCOUNTER
Patient called. He would possibly be interested in the ablation. Would like to talk to Dr Viviane Fraser again to decide. Appt scheduled.

## 2022-11-07 NOTE — PATIENT INSTRUCTIONS
You will be contacted  to schedule the afib ablation . Lexie Sharma   [phone] opt 1      You may receive a survey regarding the care you received during your visit. Your input is valuable to us. We encourage you to complete and return your survey. We hope you will choose us in the future for your healthcare needs.

## 2022-11-09 ENCOUNTER — OFFICE VISIT (OUTPATIENT)
Dept: CARDIOLOGY CLINIC | Age: 57
End: 2022-11-09
Payer: COMMERCIAL

## 2022-11-09 VITALS
OXYGEN SATURATION: 99 % | WEIGHT: 292.8 LBS | HEART RATE: 97 BPM | HEIGHT: 74 IN | BODY MASS INDEX: 37.58 KG/M2 | SYSTOLIC BLOOD PRESSURE: 148 MMHG | DIASTOLIC BLOOD PRESSURE: 104 MMHG

## 2022-11-09 DIAGNOSIS — I48.21 PERMANENT ATRIAL FIBRILLATION (HCC): Primary | ICD-10-CM

## 2022-11-09 PROCEDURE — 3074F SYST BP LT 130 MM HG: CPT | Performed by: INTERNAL MEDICINE

## 2022-11-09 PROCEDURE — 99214 OFFICE O/P EST MOD 30 MIN: CPT | Performed by: INTERNAL MEDICINE

## 2022-11-09 PROCEDURE — 3078F DIAST BP <80 MM HG: CPT | Performed by: INTERNAL MEDICINE

## 2022-11-10 ENCOUNTER — TELEPHONE (OUTPATIENT)
Dept: CARDIOLOGY CLINIC | Age: 57
End: 2022-11-10

## 2022-11-10 NOTE — CARE COORDINATION
DISCHARGE BARRIERS  1/24/18, 11:05 AM    Reason for Referral:  Discharge needs   Mental Status:  Alert, answers questions appropriately  Decision Making: makes own decisions   Family/Social/Home Environment:  Bib Hale lives at home alone. He has been independent with his care, including medications and household tasks. He is an active . He is employed full time. He has family in the area, including his daughter, who is supportive. Family can offer some assistance at home with household tasks, as needed. Current Services: [facility]  Current Equipment: walker   Payment Source: [employer]  Concerns or Barriers to Discharge: plans home alone, agrees to [employer] of ECF/HH were provided: declined, requesting to continue with [facility]    Teach Back Method used with patient regarding care plan and discharge plan  Patient  verbalizes understanding of the plan of care and contributes to goal setting. Anticipated Needs/Discharge Plan:  Spoke with Bib Hale about discharge plan. He plans home alone, with family support. He is in agreement with [facility] continuing services at discharge. He has needed equipment.        Electronically signed by MICAH Bella on 1/24/2018 at 11:05 AM Cartilage Graft Text: The defect edges were debeveled with a #15 scalpel blade.  Given the location of the defect, shape of the defect, the fact the defect involved a full thickness cartilage defect a cartilage graft was deemed most appropriate.  An appropriate donor site was identified, cleansed, and anesthetized. The cartilage graft was then harvested and transferred to the recipient site, oriented appropriately and then sutured into place.  The secondary defect was then repaired using a primary closure.

## 2022-11-10 NOTE — TELEPHONE ENCOUNTER
Procedure: Afib ablation  Date: 11.21.2022  Arrival Time: 5am  Meds to Hold: Metoprolol 3 days  Coumadin 1 day prior  How many days do you want the patient to hold Brilinta?

## 2022-11-14 RX ORDER — AMIODARONE HYDROCHLORIDE 200 MG/1
200 TABLET ORAL DAILY
Qty: 90 TABLET | Refills: 3 | Status: SHIPPED | OUTPATIENT
Start: 2022-11-14

## 2022-11-14 NOTE — TELEPHONE ENCOUNTER
Evgeny Bautista called requesting a refill on the following medications:  Requested Prescriptions     Pending Prescriptions Disp Refills    amiodarone (CORDARONE) 200 MG tablet 90 tablet 3     Sig: Take 1 tablet by mouth daily     Pharmacy verified: [facility] on [address] [place] [address] Drive, [address]  . pv    PT IS COMPLETELY OUT OF MEDICATION    Date of last visit: 10/3/2022  Date of next visit (if applicable): 64/56/6540

## 2022-11-15 ENCOUNTER — NURSE ONLY (OUTPATIENT)
Dept: LAB | Age: 57
End: 2022-11-15

## 2022-11-15 ENCOUNTER — OFFICE VISIT (OUTPATIENT)
Dept: FAMILY MEDICINE CLINIC | Age: 57
End: 2022-11-15
Payer: COMMERCIAL

## 2022-11-15 VITALS
RESPIRATION RATE: 16 BRPM | WEIGHT: 290.4 LBS | SYSTOLIC BLOOD PRESSURE: 136 MMHG | BODY MASS INDEX: 37.29 KG/M2 | HEART RATE: 76 BPM | DIASTOLIC BLOOD PRESSURE: 80 MMHG

## 2022-11-15 DIAGNOSIS — I10 ESSENTIAL HYPERTENSION: ICD-10-CM

## 2022-11-15 DIAGNOSIS — F41.3 OTHER MIXED ANXIETY DISORDERS: ICD-10-CM

## 2022-11-15 DIAGNOSIS — E11.22 CONTROLLED TYPE 2 DIABETES MELLITUS WITH CHRONIC KIDNEY DISEASE, WITHOUT LONG-TERM CURRENT USE OF INSULIN, UNSPECIFIED CKD STAGE (HCC): ICD-10-CM

## 2022-11-15 DIAGNOSIS — I25.810 CORONARY ARTERY DISEASE INVOLVING CORONARY BYPASS GRAFT OF NATIVE HEART WITHOUT ANGINA PECTORIS: ICD-10-CM

## 2022-11-15 DIAGNOSIS — F19.10 POLYSUBSTANCE ABUSE (HCC): ICD-10-CM

## 2022-11-15 DIAGNOSIS — E78.2 MIXED HYPERLIPIDEMIA: ICD-10-CM

## 2022-11-15 DIAGNOSIS — I48.21 PERMANENT ATRIAL FIBRILLATION (HCC): ICD-10-CM

## 2022-11-15 DIAGNOSIS — I50.20 HFREF (HEART FAILURE WITH REDUCED EJECTION FRACTION) (HCC): ICD-10-CM

## 2022-11-15 DIAGNOSIS — Z87.891 PERSONAL HISTORY OF TOBACCO USE: Primary | ICD-10-CM

## 2022-11-15 DIAGNOSIS — I42.0 DILATED CARDIOMYOPATHY (HCC): ICD-10-CM

## 2022-11-15 DIAGNOSIS — N18.4 STAGE 4 CHRONIC KIDNEY DISEASE (HCC): ICD-10-CM

## 2022-11-15 DIAGNOSIS — Z95.810 ICD (IMPLANTABLE CARDIOVERTER-DEFIBRILLATOR) IN PLACE: ICD-10-CM

## 2022-11-15 LAB
ALBUMIN SERPL-MCNC: 3.8 G/DL (ref 3.5–5.1)
ALP BLD-CCNC: 96 U/L (ref 38–126)
ALT SERPL-CCNC: 32 U/L (ref 11–66)
AST SERPL-CCNC: 47 U/L (ref 5–40)
AVERAGE GLUCOSE: 153 MG/DL (ref 70–126)
BILIRUB SERPL-MCNC: 0.5 MG/DL (ref 0.3–1.2)
BILIRUBIN DIRECT: < 0.2 MG/DL (ref 0–0.3)
CHOLESTEROL, TOTAL: 115 MG/DL (ref 100–199)
ERYTHROCYTE [DISTWIDTH] IN BLOOD BY AUTOMATED COUNT: 13.7 % (ref 11.5–14.5)
ERYTHROCYTE [DISTWIDTH] IN BLOOD BY AUTOMATED COUNT: 52.1 FL (ref 35–45)
HBA1C MFR BLD: 7.1 % (ref 4.4–6.4)
HCT VFR BLD CALC: 38.1 % (ref 42–52)
HDLC SERPL-MCNC: 40 MG/DL
HEMOGLOBIN: 12.4 GM/DL (ref 14–18)
LDL CHOLESTEROL CALCULATED: 51 MG/DL
MCH RBC QN AUTO: 33.9 PG (ref 26–33)
MCHC RBC AUTO-ENTMCNC: 32.5 GM/DL (ref 32.2–35.5)
MCV RBC AUTO: 104.1 FL (ref 80–94)
PLATELET # BLD: 142 THOU/MM3 (ref 130–400)
PMV BLD AUTO: 10.1 FL (ref 9.4–12.4)
RBC # BLD: 3.66 MILL/MM3 (ref 4.7–6.1)
TOTAL PROTEIN: 6 G/DL (ref 6.1–8)
TRIGL SERPL-MCNC: 121 MG/DL (ref 0–199)
WBC # BLD: 7.3 THOU/MM3 (ref 4.8–10.8)

## 2022-11-15 PROCEDURE — 3051F HG A1C>EQUAL 7.0%<8.0%: CPT | Performed by: NURSE PRACTITIONER

## 2022-11-15 PROCEDURE — 3074F SYST BP LT 130 MM HG: CPT | Performed by: NURSE PRACTITIONER

## 2022-11-15 PROCEDURE — 3078F DIAST BP <80 MM HG: CPT | Performed by: NURSE PRACTITIONER

## 2022-11-15 PROCEDURE — 99214 OFFICE O/P EST MOD 30 MIN: CPT | Performed by: NURSE PRACTITIONER

## 2022-11-15 PROCEDURE — G0296 VISIT TO DETERM LDCT ELIG: HCPCS | Performed by: NURSE PRACTITIONER

## 2022-11-15 RX ORDER — LANCETS 33 GAUGE
EACH MISCELLANEOUS
Qty: 200 EACH | Refills: 3 | Status: SHIPPED | OUTPATIENT
Start: 2022-11-15

## 2022-11-15 RX ORDER — TICAGRELOR 90 MG/1
TABLET ORAL
Qty: 180 TABLET | Refills: 3 | Status: SHIPPED | OUTPATIENT
Start: 2022-11-15

## 2022-11-15 RX ORDER — HYDROXYZINE HYDROCHLORIDE 25 MG/1
25 TABLET, FILM COATED ORAL NIGHTLY PRN
Qty: 30 TABLET | Refills: 1 | Status: SHIPPED | OUTPATIENT
Start: 2022-11-15 | End: 2022-11-25

## 2022-11-15 RX ORDER — DOXAZOSIN 2 MG/1
2 TABLET ORAL DAILY
Qty: 90 TABLET | Refills: 3 | Status: SHIPPED | OUTPATIENT
Start: 2022-11-15

## 2022-11-15 SDOH — ECONOMIC STABILITY: FOOD INSECURITY: WITHIN THE PAST 12 MONTHS, THE FOOD YOU BOUGHT JUST DIDN'T LAST AND YOU DIDN'T HAVE MONEY TO GET MORE.: NEVER TRUE

## 2022-11-15 SDOH — ECONOMIC STABILITY: FOOD INSECURITY: WITHIN THE PAST 12 MONTHS, YOU WORRIED THAT YOUR FOOD WOULD RUN OUT BEFORE YOU GOT MONEY TO BUY MORE.: NEVER TRUE

## 2022-11-15 ASSESSMENT — ENCOUNTER SYMPTOMS
ABDOMINAL PAIN: 0
NAUSEA: 0
SHORTNESS OF BREATH: 0
COUGH: 0

## 2022-11-15 ASSESSMENT — SOCIAL DETERMINANTS OF HEALTH (SDOH): HOW HARD IS IT FOR YOU TO PAY FOR THE VERY BASICS LIKE FOOD, HOUSING, MEDICAL CARE, AND HEATING?: NOT HARD AT ALL

## 2022-11-15 NOTE — PATIENT INSTRUCTIONS
You may receive a survey about your visit with us today. The feedback from our patients helps us identify what is working well and where the service to all patients can be enhanced. Thank you! Tobacco Cessation Programs     Telephonic behavior modification  1-800-QUIT-NOW (990-2906)  Counseling service for those who are ready to quit using tobacco.    Available for uninsured [place] residents, Medicaid recipients, pregnant women, or patients whose health plans or employers are members of the [address] [place] [address] behavior modification  http://[place]. Quitlogix. org  Online support program to help patients through each step of the quitting process. Available 24 hours a day 7 days a week. Provides up to date researched based tool, step-by-step guides, and motivational messages. Online behavior modification  www.lungusa.org/stop-smoking/how-to-quit  HelpLine: 1-800-LUNG-USA (491-6388)  Email questions to:  Evin@XMOS. Kwikpik   Website offers resources to help tobacco users figure out their reasons for quitting and then take the big step of quitting for good. Hypnosis  Location: [address] [place] [address]  Contact: Ash Rodas, PhD at [phone]  Hypnosis for tobacco cessation  Cost $225 for the initial session and $175 for each session afterwards. Most patients require 6-8 sessions. There is the option to submit through the patients insurance. Hypnosis and behavior modification  Location: [place] [address],  [place] [address]., [address] [place] [address]  Contact: Marino Monzon, PhD at [phone]  Counseling and hypnosis for nicotine addition  Cost: For uninsured patients:  Please call above phone number  Cost for insured patients depends on patients insurance plan.     Behavior modification  Location: [facility], [address] [facility] [place] [address] 50: [phone]  Services include four one-on-one appointments between the patient and a respiratory therapist.  The four appointments span over three weeks. The respiratory therapist schedules one of the appointments to occur 48 hours after the patients quit date. Cost $100 total for the four sessions. Tobacco cessation products are not included in the cost and are not provided by [address]  What is screening for lung cancer? Lung cancer screening is a way to find some lung cancers early, before a person has any symptoms of the cancer. Lung cancer screening may help those who have the highest risk for lung cancer--people age 48 and older who are or were heavy smokers. For most people, who aren't at increased risk, screening for lung cancer probably isn't helpful. Screening won't prevent cancer. And it may not find all lung cancers. Lung cancer screening may lower the risk of dying from lung cancer in a small number of people. How is it done? Lung cancer screening is done with a low-dose CT (computed tomography) scan. A CT scan uses X-rays, or radiation, to make detailed pictures of your body. Experts recommend that screening be done in medical centers that focus on finding and treating lung cancer. Who is screening recommended for? Lung cancer screening is recommended for people age 48 and older who are or were heavy smokers. That means people with a smoking history of at least 20 pack years. A pack year is a way to measure how heavy a smoker you are or were. To figure out your pack years, multiply how many packs a day on average (assuming 20 cigarettes per pack) you have smoked by how many years you have smoked. For example: If you smoked 1 pack a day for 20 years, that's 1 times 20. So you have a smoking history of 20 pack years. If you smoked 2 packs a day for 10 years, that's 2 times 10. So you have a smoking history of 20 pack years. Experts agree that screening is for people who have a high risk of lung cancer. But experts don't agree on what high risk means.  Some say people age 48 or older with at least a 20-pack-year smoking history are high risk. Others say it's people age 54 or older with a 30-pack-year history. To see if you could benefit from screening, first find out if you are at high risk for lung cancer. Your doctor can help you decide your lung cancer risk. What are the risks of screening? CT screening for lung cancer isn't perfect. It can show an abnormal result when it turns out there wasn't any cancer. This is called a false-positive result. This means you may need more tests to make sure you don't have cancer. These tests can be harmful and cause a lot of worry. These tests may include more CT scans and invasive testing like a lung biopsy. In a biopsy, the doctor takes a sample of tissue from inside your lung so it can be looked at under a microscope. A biopsy is the only way to tell if you have lung cancer. If the biopsy finds cancer, you and your doctor will have to decide how or whether to treat it. Some lung cancers found on CT scans are harmless and would not have caused a problem if they had not been found through screening. But because doctors can't tell which ones will turn out to be harmless, most will be treated. This means that you may get treatment--including surgery, radiation, or chemotherapy--that you don't need. There is a risk of damage to cells or tissue from being exposed to radiation, including the small amounts used in CTs, X-rays, and other medical tests. Over time, exposure to radiation may cause cancer and other health problems. But in most cases, the risk of getting cancer from being exposed to small amounts of radiation is low. It's not a reason to avoid these tests for most people. What are the benefits of screening? Your scan may be normal (negative). For some people who are at higher risk, screening lowers the chance of dying of lung cancer. How much and how long you smoked helps to determine your risk level.  Screening can find some cancers early, when treatment may be more likely to work. What happens after screening? The results of your CT scan will be sent to your doctor. Someone from your care team will explain the results of your scan and answer any questions you may have. If you need any follow-up, he or she will help you understand what to do next. After a lung cancer screening, you can go back to your usual activities right away. A lung cancer screening test can't tell if you have lung cancer. If your results are positive, your doctor can't tell whether an abnormal finding is a harmless nodule, cancer, or something else without doing more tests. What can you do to help prevent lung cancer? Some lung cancers can't be prevented. But if you smoke, quitting smoking is the best step you can take to prevent lung cancer. If you want to quit, your doctor can recommend medicines or other ways to help. Follow-up care is a key part of your treatment and safety. Be sure to make and go to all appointments, and call your doctor if you are having problems. It's also a good idea to know your test results and keep a list of the medicines you take. Where can you learn more? Go to https://"[employer].Adviqo. org and sign in to your [employer] account. Enter M936 in the [employer]olesale box to learn more about \"Learning About Lung Cancer Screening. \"     If you do not have an account, please click on the \"Sign Up Now\" link. Current as of: May 4, 2022               Content Version: 13.4  © 2006-2022 Healthwise, Incorporated. Care instructions adapted under license by [facility] ([facility]). If you have questions about a medical condition or this instruction, always ask your healthcare professional. [place] [address] any warranty or liability for your use of this information.
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
## 2022-11-16 LAB
ANION GAP SERPL CALCULATED.3IONS-SCNC: 15 MEQ/L (ref 8–16)
BUN BLDV-MCNC: 36 MG/DL (ref 7–22)
CALCIUM SERPL-MCNC: 9.4 MG/DL (ref 8.5–10.5)
CHLORIDE BLD-SCNC: 105 MEQ/L (ref 98–111)
CO2: 23 MEQ/L (ref 23–33)
CREAT SERPL-MCNC: 2.8 MG/DL (ref 0.4–1.2)
GFR SERPL CREATININE-BSD FRML MDRD: 25 ML/MIN/1.73M2
GLUCOSE BLD-MCNC: 96 MG/DL (ref 70–108)
POTASSIUM SERPL-SCNC: 4.4 MEQ/L (ref 3.5–5.2)
SODIUM BLD-SCNC: 143 MEQ/L (ref 135–145)

## 2022-11-17 ENCOUNTER — NURSE ONLY (OUTPATIENT)
Dept: LAB | Age: 57
End: 2022-11-17

## 2022-11-17 DIAGNOSIS — E11.22 CONTROLLED TYPE 2 DIABETES MELLITUS WITH CHRONIC KIDNEY DISEASE, WITHOUT LONG-TERM CURRENT USE OF INSULIN, UNSPECIFIED CKD STAGE (HCC): ICD-10-CM

## 2022-11-17 LAB
CREATININE, URINE: 98 MG/DL
MICROALBUMIN UR-MCNC: 173.93 MG/DL
MICROALBUMIN/CREAT UR-RTO: 1775 MG/G (ref 0–30)

## 2022-11-18 ENCOUNTER — PREP FOR PROCEDURE (OUTPATIENT)
Dept: CARDIOLOGY | Age: 57
End: 2022-11-18

## 2022-11-18 RX ORDER — SODIUM CHLORIDE 9 MG/ML
INJECTION, SOLUTION INTRAVENOUS PRN
Status: CANCELLED | OUTPATIENT
Start: 2022-11-18

## 2022-11-18 RX ORDER — SODIUM CHLORIDE 0.9 % (FLUSH) 0.9 %
5-40 SYRINGE (ML) INJECTION PRN
Status: CANCELLED | OUTPATIENT
Start: 2022-11-18

## 2022-11-18 RX ORDER — SODIUM CHLORIDE 0.9 % (FLUSH) 0.9 %
5-40 SYRINGE (ML) INJECTION EVERY 12 HOURS SCHEDULED
Status: CANCELLED | OUTPATIENT
Start: 2022-11-18

## 2022-11-21 ENCOUNTER — APPOINTMENT (OUTPATIENT)
Dept: CARDIAC CATH/INVASIVE PROCEDURES | Age: 57
DRG: 940 | End: 2022-11-21
Payer: COMMERCIAL

## 2022-11-21 ENCOUNTER — HOSPITAL ENCOUNTER (OUTPATIENT)
Dept: INPATIENT UNIT | Age: 57
Discharge: HOME | DRG: 940 | End: 2022-11-21
Attending: INTERNAL MEDICINE | Admitting: INTERNAL MEDICINE
Payer: COMMERCIAL

## 2022-11-21 ENCOUNTER — ANESTHESIA EVENT (OUTPATIENT)
Dept: CARDIAC CATH/INVASIVE PROCEDURES | Age: 57
DRG: 940 | End: 2022-11-21
Payer: COMMERCIAL

## 2022-11-21 ENCOUNTER — ANESTHESIA (OUTPATIENT)
Dept: CARDIAC CATH/INVASIVE PROCEDURES | Age: 57
DRG: 940 | End: 2022-11-21
Payer: COMMERCIAL

## 2022-11-21 VITALS
DIASTOLIC BLOOD PRESSURE: 79 MMHG | HEART RATE: 77 BPM | BODY MASS INDEX: 37.22 KG/M2 | HEIGHT: 74 IN | RESPIRATION RATE: 15 BRPM | SYSTOLIC BLOOD PRESSURE: 162 MMHG | OXYGEN SATURATION: 97 % | TEMPERATURE: 98.2 F | WEIGHT: 290 LBS

## 2022-11-21 LAB
ABO: NORMAL
ACTIVATED CLOTTING TIME: 167 SECONDS (ref 1–150)
ACTIVATED CLOTTING TIME: 335 SECONDS (ref 1–150)
ACTIVATED CLOTTING TIME: 342 SECONDS (ref 1–150)
ACTIVATED CLOTTING TIME: 347 SECONDS (ref 1–150)
ACTIVATED CLOTTING TIME: 360 SECONDS (ref 1–150)
ANION GAP SERPL CALCULATED.3IONS-SCNC: 11 MEQ/L (ref 8–16)
ANTIBODY SCREEN: NORMAL
APTT: 39.4 SECONDS (ref 22–38)
BUN BLDV-MCNC: 33 MG/DL (ref 7–22)
CALCIUM SERPL-MCNC: 8.3 MG/DL (ref 8.5–10.5)
CHLORIDE BLD-SCNC: 107 MEQ/L (ref 98–111)
CO2: 21 MEQ/L (ref 23–33)
CREAT SERPL-MCNC: 2.6 MG/DL (ref 0.4–1.2)
EKG ATRIAL RATE: 73 BPM
EKG P AXIS: -15 DEGREES
EKG P-R INTERVAL: 198 MS
EKG Q-T INTERVAL: 364 MS
EKG Q-T INTERVAL: 436 MS
EKG QRS DURATION: 90 MS
EKG QRS DURATION: 92 MS
EKG QTC CALCULATION (BAZETT): 469 MS
EKG QTC CALCULATION (BAZETT): 480 MS
EKG R AXIS: 15 DEGREES
EKG R AXIS: 6 DEGREES
EKG T AXIS: 124 DEGREES
EKG T AXIS: 52 DEGREES
EKG VENTRICULAR RATE: 100 BPM
EKG VENTRICULAR RATE: 73 BPM
ERYTHROCYTE [DISTWIDTH] IN BLOOD BY AUTOMATED COUNT: 13.6 % (ref 11.5–14.5)
ERYTHROCYTE [DISTWIDTH] IN BLOOD BY AUTOMATED COUNT: 50.7 FL (ref 35–45)
GFR SERPL CREATININE-BSD FRML MDRD: 28 ML/MIN/1.73M2
GLUCOSE BLD-MCNC: 151 MG/DL (ref 70–108)
HCT VFR BLD CALC: 36.6 % (ref 42–52)
HEMOGLOBIN: 12.1 GM/DL (ref 14–18)
INR BLD: 2.19 (ref 0.85–1.13)
MCH RBC QN AUTO: 33.6 PG (ref 26–33)
MCHC RBC AUTO-ENTMCNC: 33.1 GM/DL (ref 32.2–35.5)
MCV RBC AUTO: 101.7 FL (ref 80–94)
PLATELET # BLD: 133 THOU/MM3 (ref 130–400)
PMV BLD AUTO: 10.1 FL (ref 9.4–12.4)
POTASSIUM SERPL-SCNC: 3.6 MEQ/L (ref 3.5–5.2)
RBC # BLD: 3.6 MILL/MM3 (ref 4.7–6.1)
RH FACTOR: NORMAL
SODIUM BLD-SCNC: 139 MEQ/L (ref 135–145)
WBC # BLD: 7.1 THOU/MM3 (ref 4.8–10.8)

## 2022-11-21 PROCEDURE — 86850 RBC ANTIBODY SCREEN: CPT

## 2022-11-21 PROCEDURE — 02583ZZ DESTRUCTION OF CONDUCTION MECHANISM, PERCUTANEOUS APPROACH: ICD-10-PCS | Performed by: INTERNAL MEDICINE

## 2022-11-21 PROCEDURE — 85027 COMPLETE CBC AUTOMATED: CPT

## 2022-11-21 PROCEDURE — 5A2204Z RESTORATION OF CARDIAC RHYTHM, SINGLE: ICD-10-PCS | Performed by: INTERNAL MEDICINE

## 2022-11-21 PROCEDURE — C1759 CATH, INTRA ECHOCARDIOGRAPHY: HCPCS

## 2022-11-21 PROCEDURE — C1732 CATH, EP, DIAG/ABL, 3D/VECT: HCPCS

## 2022-11-21 PROCEDURE — 93005 ELECTROCARDIOGRAM TRACING: CPT | Performed by: STUDENT IN AN ORGANIZED HEALTH CARE EDUCATION/TRAINING PROGRAM

## 2022-11-21 PROCEDURE — 80048 BASIC METABOLIC PNL TOTAL CA: CPT

## 2022-11-21 PROCEDURE — 93656 COMPRE EP EVAL ABLTJ ATR FIB: CPT

## 2022-11-21 PROCEDURE — 93657 TX L/R ATRIAL FIB ADDL: CPT

## 2022-11-21 PROCEDURE — 85730 THROMBOPLASTIN TIME PARTIAL: CPT

## 2022-11-21 PROCEDURE — 86900 BLOOD TYPING SEROLOGIC ABO: CPT

## 2022-11-21 PROCEDURE — 2580000003 HC RX 258: Performed by: NURSE ANESTHETIST, CERTIFIED REGISTERED

## 2022-11-21 PROCEDURE — C1894 INTRO/SHEATH, NON-LASER: HCPCS

## 2022-11-21 PROCEDURE — 93005 ELECTROCARDIOGRAM TRACING: CPT | Performed by: INTERNAL MEDICINE

## 2022-11-21 PROCEDURE — 2500000003 HC RX 250 WO HCPCS

## 2022-11-21 PROCEDURE — C1766 INTRO/SHEATH,STRBLE,NON-PEEL: HCPCS

## 2022-11-21 PROCEDURE — 2720000010 HC SURG SUPPLY STERILE

## 2022-11-21 PROCEDURE — 2500000003 HC RX 250 WO HCPCS: Performed by: NURSE ANESTHETIST, CERTIFIED REGISTERED

## 2022-11-21 PROCEDURE — 7100000000 HC PACU RECOVERY - FIRST 15 MIN

## 2022-11-21 PROCEDURE — 93010 ELECTROCARDIOGRAM REPORT: CPT | Performed by: INTERNAL MEDICINE

## 2022-11-21 PROCEDURE — 7100000001 HC PACU RECOVERY - ADDTL 15 MIN

## 2022-11-21 PROCEDURE — 4A023FZ MEASUREMENT OF CARDIAC RHYTHM, PERCUTANEOUS APPROACH: ICD-10-PCS | Performed by: INTERNAL MEDICINE

## 2022-11-21 PROCEDURE — 6360000002 HC RX W HCPCS

## 2022-11-21 PROCEDURE — 2709999900 HC NON-CHARGEABLE SUPPLY

## 2022-11-21 PROCEDURE — 6360000002 HC RX W HCPCS: Performed by: NURSE ANESTHETIST, CERTIFIED REGISTERED

## 2022-11-21 PROCEDURE — 93656 COMPRE EP EVAL ABLTJ ATR FIB: CPT | Performed by: INTERNAL MEDICINE

## 2022-11-21 PROCEDURE — 4A0234Z MEASUREMENT OF CARDIAC ELECTRICAL ACTIVITY, PERCUTANEOUS APPROACH: ICD-10-PCS | Performed by: INTERNAL MEDICINE

## 2022-11-21 PROCEDURE — 93655 ICAR CATH ABLTJ DSCRT ARRHYT: CPT

## 2022-11-21 PROCEDURE — 85347 COAGULATION TIME ACTIVATED: CPT

## 2022-11-21 PROCEDURE — 86901 BLOOD TYPING SEROLOGIC RH(D): CPT

## 2022-11-21 PROCEDURE — 36415 COLL VENOUS BLD VENIPUNCTURE: CPT

## 2022-11-21 PROCEDURE — 02K83ZZ MAP CONDUCTION MECHANISM, PERCUTANEOUS APPROACH: ICD-10-PCS | Performed by: INTERNAL MEDICINE

## 2022-11-21 PROCEDURE — 3700000001 HC ADD 15 MINUTES (ANESTHESIA)

## 2022-11-21 PROCEDURE — 2580000003 HC RX 258: Performed by: INTERNAL MEDICINE

## 2022-11-21 PROCEDURE — C1730 CATH, EP, 19 OR FEW ELECT: HCPCS

## 2022-11-21 PROCEDURE — 3700000000 HC ANESTHESIA ATTENDED CARE

## 2022-11-21 PROCEDURE — 6370000000 HC RX 637 (ALT 250 FOR IP): Performed by: INTERNAL MEDICINE

## 2022-11-21 PROCEDURE — 93655 ICAR CATH ABLTJ DSCRT ARRHYT: CPT | Performed by: INTERNAL MEDICINE

## 2022-11-21 PROCEDURE — 85610 PROTHROMBIN TIME: CPT

## 2022-11-21 RX ORDER — PANTOPRAZOLE SODIUM 40 MG/1
40 TABLET, DELAYED RELEASE ORAL
Qty: 30 TABLET | Refills: 0 | Status: SHIPPED | OUTPATIENT
Start: 2022-11-21

## 2022-11-21 RX ORDER — SODIUM CHLORIDE 9 MG/ML
INJECTION, SOLUTION INTRAVENOUS CONTINUOUS PRN
Status: DISCONTINUED | OUTPATIENT
Start: 2022-11-21 | End: 2022-11-21 | Stop reason: SDUPTHER

## 2022-11-21 RX ORDER — LIDOCAINE HYDROCHLORIDE 20 MG/ML
INJECTION, SOLUTION EPIDURAL; INFILTRATION; INTRACAUDAL; PERINEURAL PRN
Status: DISCONTINUED | OUTPATIENT
Start: 2022-11-21 | End: 2022-11-21 | Stop reason: SDUPTHER

## 2022-11-21 RX ORDER — FUROSEMIDE 10 MG/ML
INJECTION INTRAMUSCULAR; INTRAVENOUS PRN
Status: DISCONTINUED | OUTPATIENT
Start: 2022-11-21 | End: 2022-11-21 | Stop reason: SDUPTHER

## 2022-11-21 RX ORDER — AMIODARONE HYDROCHLORIDE 50 MG/ML
INJECTION, SOLUTION INTRAVENOUS PRN
Status: DISCONTINUED | OUTPATIENT
Start: 2022-11-21 | End: 2022-11-21 | Stop reason: SDUPTHER

## 2022-11-21 RX ORDER — HEPARIN SODIUM 1000 [USP'U]/ML
INJECTION, SOLUTION INTRAVENOUS; SUBCUTANEOUS PRN
Status: DISCONTINUED | OUTPATIENT
Start: 2022-11-21 | End: 2022-11-21 | Stop reason: SDUPTHER

## 2022-11-21 RX ORDER — FENTANYL CITRATE 50 UG/ML
INJECTION, SOLUTION INTRAMUSCULAR; INTRAVENOUS PRN
Status: DISCONTINUED | OUTPATIENT
Start: 2022-11-21 | End: 2022-11-21 | Stop reason: SDUPTHER

## 2022-11-21 RX ORDER — PROTAMINE SULFATE 10 MG/ML
INJECTION, SOLUTION INTRAVENOUS PRN
Status: DISCONTINUED | OUTPATIENT
Start: 2022-11-21 | End: 2022-11-21 | Stop reason: SDUPTHER

## 2022-11-21 RX ORDER — ATORVASTATIN CALCIUM 80 MG/1
TABLET, FILM COATED ORAL
Qty: 30 TABLET | Refills: 11 | Status: SHIPPED | OUTPATIENT
Start: 2022-11-21

## 2022-11-21 RX ORDER — DEXAMETHASONE SODIUM PHOSPHATE 4 MG/ML
INJECTION, SOLUTION INTRA-ARTICULAR; INTRALESIONAL; INTRAMUSCULAR; INTRAVENOUS; SOFT TISSUE PRN
Status: DISCONTINUED | OUTPATIENT
Start: 2022-11-21 | End: 2022-11-21 | Stop reason: SDUPTHER

## 2022-11-21 RX ORDER — ROCURONIUM BROMIDE 10 MG/ML
INJECTION, SOLUTION INTRAVENOUS PRN
Status: DISCONTINUED | OUTPATIENT
Start: 2022-11-21 | End: 2022-11-21 | Stop reason: SDUPTHER

## 2022-11-21 RX ORDER — ACETAMINOPHEN 325 MG/1
650 TABLET ORAL EVERY 4 HOURS PRN
Status: DISCONTINUED | OUTPATIENT
Start: 2022-11-21 | End: 2022-11-21 | Stop reason: HOSPADM

## 2022-11-21 RX ORDER — SODIUM CHLORIDE 9 MG/ML
INJECTION, SOLUTION INTRAVENOUS CONTINUOUS
Status: DISCONTINUED | OUTPATIENT
Start: 2022-11-21 | End: 2022-11-21 | Stop reason: HOSPADM

## 2022-11-21 RX ADMIN — FUROSEMIDE 20 MG: 10 INJECTION, SOLUTION INTRAMUSCULAR; INTRAVENOUS at 11:53

## 2022-11-21 RX ADMIN — LIDOCAINE HYDROCHLORIDE 100 MG: 20 INJECTION, SOLUTION EPIDURAL; INFILTRATION; INTRACAUDAL; PERINEURAL at 07:39

## 2022-11-21 RX ADMIN — PROTAMINE SULFATE 40 MG: 10 INJECTION, SOLUTION INTRAVENOUS at 11:40

## 2022-11-21 RX ADMIN — SODIUM CHLORIDE: 9 INJECTION, SOLUTION INTRAVENOUS at 07:35

## 2022-11-21 RX ADMIN — SUGAMMADEX 200 MG: 100 INJECTION, SOLUTION INTRAVENOUS at 11:40

## 2022-11-21 RX ADMIN — HEPARIN SODIUM 2000 UNITS: 1000 INJECTION INTRAVENOUS; SUBCUTANEOUS at 09:48

## 2022-11-21 RX ADMIN — ROCURONIUM BROMIDE 50 MG: 10 INJECTION INTRAVENOUS at 07:39

## 2022-11-21 RX ADMIN — ACETAMINOPHEN 650 MG: 325 TABLET ORAL at 13:30

## 2022-11-21 RX ADMIN — PROPOFOL 150 MG: 10 INJECTION, EMULSION INTRAVENOUS at 07:39

## 2022-11-21 RX ADMIN — HEPARIN SODIUM 10000 UNITS: 1000 INJECTION INTRAVENOUS; SUBCUTANEOUS at 08:49

## 2022-11-21 RX ADMIN — HEPARIN SODIUM 5000 UNITS: 1000 INJECTION INTRAVENOUS; SUBCUTANEOUS at 08:45

## 2022-11-21 RX ADMIN — FENTANYL CITRATE 50 MCG: 50 INJECTION, SOLUTION INTRAMUSCULAR; INTRAVENOUS at 11:25

## 2022-11-21 RX ADMIN — FENTANYL CITRATE 50 MCG: 50 INJECTION, SOLUTION INTRAMUSCULAR; INTRAVENOUS at 07:39

## 2022-11-21 RX ADMIN — SODIUM CHLORIDE: 9 INJECTION, SOLUTION INTRAVENOUS at 05:57

## 2022-11-21 RX ADMIN — DEXAMETHASONE SODIUM PHOSPHATE 10 MG: 4 INJECTION, SOLUTION INTRAMUSCULAR; INTRAVENOUS at 07:39

## 2022-11-21 RX ADMIN — AMIODARONE HYDROCHLORIDE 150 MG: 50 INJECTION, SOLUTION INTRAVENOUS at 10:08

## 2022-11-21 RX ADMIN — FENTANYL CITRATE 50 MCG: 50 INJECTION, SOLUTION INTRAMUSCULAR; INTRAVENOUS at 09:28

## 2022-11-21 ASSESSMENT — PAIN DESCRIPTION - LOCATION
LOCATION: GROIN
LOCATION: GROIN

## 2022-11-21 ASSESSMENT — PAIN DESCRIPTION - PAIN TYPE: TYPE: SURGICAL PAIN

## 2022-11-21 ASSESSMENT — PAIN DESCRIPTION - FREQUENCY: FREQUENCY: CONTINUOUS

## 2022-11-21 ASSESSMENT — PAIN SCALES - GENERAL
PAINLEVEL_OUTOF10: 7
PAINLEVEL_OUTOF10: 6

## 2022-11-21 ASSESSMENT — PAIN DESCRIPTION - ONSET: ONSET: ON-GOING

## 2022-11-21 ASSESSMENT — PAIN DESCRIPTION - DESCRIPTORS
DESCRIPTORS: SORE
DESCRIPTORS: STABBING

## 2022-11-21 ASSESSMENT — PAIN - FUNCTIONAL ASSESSMENT: PAIN_FUNCTIONAL_ASSESSMENT: PREVENTS OR INTERFERES SOME ACTIVE ACTIVITIES AND ADLS

## 2022-11-21 ASSESSMENT — PAIN DESCRIPTION - ORIENTATION
ORIENTATION: RIGHT;LEFT
ORIENTATION: RIGHT

## 2022-11-21 NOTE — BRIEF OP NOTE
.Brief Postoperative Note    Date:   11/21/2022  Patient name: Serge Edward  YOB: 1965  Sex: male   MRN:   [id number]    PCP: ISABELLA Suresh CNP     Procedure:Atrial flutter and atrial fibrillation ablation. Pre-Op Diagnosis: Atrial fibrillation. Post-Op Diagnosis: Typical atrial flutter and atrial fibrillation. Surgeon: Allan Weiss MD, [facility], [place], [place]    Assistant: Florian Grady. Anesthesia/sedation: General Local lidocaine/fentanyl and midazolam as needed. Estimated Blood Loss (mL): less than 50     Complications: None    Recommendations:  See orders in Epic. Bed rest for 2 hours. Watch access site/s for bleeding or swelling. Hold pressure if bleeding or swelling. Ambulate 2 hour after suture/sheath removal.  Remove Mason's catheter (if in place) once patient ambulates. Stop IV fluids once patient starts eating. Resume home medications as tonight. Follow up in 4 weeks in EP clinic.            Electronically signed by Alan Germain MD, Beauford Rummage on 11/21/2022 at 12:20 PM

## 2022-11-21 NOTE — H&P
[address] [facility] ()  [address] [facility] [address]  H&P and SEDATION/ANALGESIA     Patient demographics:  Date:   11/21/2022  Patient name: Jovon Garcia  YOB: 1965  Sex: male   MRN:   [id number]    Reason for admission or planned procedure:  Atrial fibrillation ablation. Code Status: Full Code    Consent:The risk and benefits of Afib catheter ablation including bleeding, vascular complications, pericardial tamponade requiring emergency pericardiocentesis or emergency sternotomy and placement of pericardial drain or emergency sternotomy, phrenic nerve injury, pulmonary vein stenosis, atrio-esophageal fistula,  stroke, MI, death, potential exposure to radiation and recurrent atrial tachy-arrhythmia requiring further ablations and/or initiation of AAD therapy were discussed with the patient. He verbalized understanding of the discussion. His questions were answered. The patient wishes to proceed. Brief clinical summary:  57/M with symptomatic persistent AF on amiodarone electively presents for AF ablation. Uninterrupted anticoagulation with warfarin (INR 2.2).   Medical Hx: PeAF dx 2014=> recurrence sp DCCV 4/2022, CAD/CABG x3 (5/2014) and PCI-LAD (10/2020), ICM (LVEF 25-30%), VT ablation (2014) on amiodarone and mexilitine, [employer] secondary prevention DC-AICD by Dr. 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    Anxiety disorder     Arthritis     Atrial fibrillation ([address]     CAD (coronary artery disease) 3/25/2014    s/p CABG in may 2014    Cardiomyopathy ([address]     Chronic kidney disease     Depression     Diabetes mellitus ([address]     Drug abuse ([address]     hx of cacaine abuse, stopped abusing drugs in 2012    GERD (gastroesophageal reflux disease)     H/O cardiac catheterization 4/30/2014    Hyperlipidemia     Hypertension     Intradural extramedullary spinal tumor     Lumbar spine tumor     [employer]tr[employer] dual icd      Neuromuscular disorder [address]     Other disorders of kidney and ureter in diseases classified elsewhere     Paroxysmal atrial fibrillation ([facility] 7/22/2014    V tach     V-tach     s/p ablation in dec 2014       Past Surgical History:  Past Surgical History:   Procedure Laterality Date    CARDIAC CATHETERIZATION  3/20/14     [facility]    CARDIAC DEFIBRILLATOR PLACEMENT  10/13/2015    [employer] [place], MRI CONDITIONAL ICD    CORONARY ARTERY BYPASS GRAFT  5-9-14    3 bypass    EKG 12-LEAD  7/17/2015         OTHER SURGICAL HISTORY Left 10/21/14    Left Bursectomy, I & D Left Elbow - Dr. Radha Vidales LAMINECTOMY,>2 [facility] N/A 1/16/2018    LUMBAR AND SACRAL LAMINECTOMY, REMOVAL OF INTRASPINAL TUMORS performed by Duane Shark, MD at [address] [place] [address] harvest from legs        Allergies: Allergies as of 11/21/2022 - Fully Reviewed 11/21/2022   Allergen Reaction Noted    Latex  10/27/2020    Pcn [penicillins] Hives 06/01/2013    Zoloft [sertraline hcl] Anxiety 10/06/2015     Additional information:       Medications     Current Facility-Administered Medications:     0.9 % sodium chloride infusion, , IntraVENous, Continuous, Eugene Rob MD, Last Rate: 75 mL/hr at 11/21/22 0557, New Bag at 11/21/22 0557  Prior to Admission medications    Medication Sig Start Date End Date Taking?  Authorizing Provider   doxazosin (CARDURA) 2 MG tablet TAKE 1 TABLET BY MOUTH DAILY 11/15/22   ISABELLA Lopez CNP   Lancets (Ruth White) [address] [facility] USE AS DIRECTED TWICE DAILY 11/15/22   ISABELLA Lopez CNP   BRILINTA 90 MG TABS tablet TAKE 1 TABLET BY MOUTH TWICE DAILY 11/15/22   [address], MD   hydrOXYzine HCl (ATARAX) 25 MG tablet Take 1 tablet by mouth nightly as needed for Anxiety 11/15/22 11/25/22  ISABELLA May CNP   amiodarone (CORDARONE) 200 MG tablet Take 1 tablet by mouth daily 11/14/22   Santi James MD   ALPRAZolam [place]) 0.5 MG tablet TAKE 1 TABLET BY MOUTH AT BEDTIME FOR ANXIETY 10/24/22 4/22/23  ISABELLA May CNP   linagliptin (TRADJENTA) 5 MG tablet TAKE 1 TABLET BY MOUTH DAILY 10/3/22   ISABELLA May CNP   potassium chloride (KLOR-CON M) 20 MEQ extended release tablet TAKE 1 TABLET BY MOUTH DAILY 10/3/22   ISABELLA May CNP   bumetanide (BUMEX) 2 MG tablet Take 1 tablet by mouth in the morning. 8/10/22   Stefanie Ryan MD   doxazosin (CARDURA) 4 MG tablet Take 1 tablet by mouth in the morning. Patient not taking: Reported on 11/21/2022 7/20/22   Dee Rider MD   ranolazine (RANEXA) 500 MG extended release tablet Take 1 tablet by mouth in the morning and 1 tablet before bedtime.   Patient not taking: Reported on 11/21/2022 7/18/22   Ignacio Rivera MD   insulin glargine (LANTUS SOLOSTAR) 100 UNIT/ML injection pen Inject 45 Units into the skin nightly 7/12/22   ISABELLA May CNP   hydrALAZINE (APRESOLINE) 100 MG tablet TAKE 1 TABLET BY MOUTH THREE TIMES DAILY 6/14/22   Fany James MD   atorvastatin (LIPITOR) 80 MG tablet TAKE 1 TABLET BY MOUTH EVERY NIGHT 6/2/22   ISABELLA May CNP   blood glucose test strips [facility] ULTRA) strip USE AS DIRECTED TWICE DAILY 5/19/22   ISABELLA May CNP   warfarin (COUMADIN) 5 MG tablet USE AS DIRECTED BY COUMADIN CLINIC 4/12/22   ISABELLA May CNP   isosorbide mononitrate (IMDUR) 120 MG extended release tablet Take 1 tablet by mouth daily 4/6/22   ISABELLA May CNP   metoprolol succinate (TOPROL XL) 100 MG extended release tablet Take 1 tablet by mouth daily 3/14/22   Ignacio Rivera MD   mexiletine (MEXITIL) 150 MG capsule TAKE 2 CAPSULES THREE TIMES DAILY FOR ATRIAL FIBRILLATION 2/21/22   Gemma Kapadia MD   Blood Glucose Monitoring Suppl (ONE TOUCH ULTRA 2) w/Device KIT 1 kit by Does not apply route 2 times daily 11/22/21   ISABELLA Mccullough CNP   Insulin Pen Needle 31G X 8 MM MISC 1 each by Does not apply route daily 11/22/21   ISABELLA Mccullough CNP   losartan (COZAAR) 50 MG tablet TAKE 1 TABLET BY MOUTH DAILY  Patient not taking: Reported on 11/21/2022 6/21/21   Beth Gloria,    glucose blood VI test strips (PHIL CONTOUR TEST) strip 1 each by In Vitro route daily 1/5/17   ISABELLA Mccullough CNP   acetaminophen 650 MG TABS Take 650 mg by mouth every 4 hours as needed 1/22/16   Garret Whipple MD     Aspirin  [] 81 mg  [] 325 mg  [x] None  Antiplatelet drug therapy use last 5 days  []No [x]Yes  Coumadin Use Last 5 Days []No [x]Yes  Other anticoagulant use last 5 days  [x]No []Yes  Additional information: Per medical records       Vitals:   Vitals:    11/21/22 0531   BP: (!) 156/87   Pulse: (!) 110   Resp: 20   Temp:    SpO2: 95%       Physical Examination:   GENERAL: Alert and oriented. No distress. EYES: No pallor or icterus. ENT: No central cyanosis. VESSELS: No jugular venous distension or carotid bruits. HEART: Normal S1/S2. No murmur, rub or gallop. LUNGS: Clear to auscultation. CHEST WALL: No erosions, discharge or swelling at device site. ABDOMEN: Soft and non-tender. EXTREMITIES: No lower extremity edema. Feet are warm. NEUROLOGICAL: Grossly intact. Procedure Plannd:   [x] AF ablation [] Atrial Flutter ablation [] SVT ablation [] PVC/VT ablation [x] EP study  [] Pacemaker implantation [] AICD implantation [] BiV PM/ICD implantation   [] Generator change [] Loop recorder implantation [] Loop recorder explantation. [] Micra leadless pacemaker implantation. [] His bundle/Left bundle pacemaker implantation. [] Lead revision. [] Pocket Revision.  [] MYRIAM. [] Cardioversion    []Other:       Planned Sedation/Analgesia:  [x] General anesthesia.   [] Midazolam [] Fentanyl [] Propofol [] Propofol with anesthesia team.    []Midazolam []Meperidine []Sublimaze []Morphine [] Diazepam    []Other:       MD MD Cyrus Benítez  Electronically signed 11/21/2022 at 7:10 AM

## 2022-11-21 NOTE — PLAN OF CARE
Problem: Discharge Planning  Goal: Discharge to home or other facility with appropriate resources  11/21/2022 7742 by Lisa Ram RN  Outcome: Progressing  11/21/2022 0626 by Lisa Ram RN  Outcome: Progressing   Care plan reviewed with patient and daughters. Patient and daughters verbalize understanding of the plan of care and contribute to goal setting.        Problem: Safety - Adult  Goal: Free from fall injury  Outcome: Progressing   Pt placed on fall prevention program

## 2022-11-21 NOTE — PROGRESS NOTES
1205 Patient arrived to PACU, alert and oriented. Resp easy and unlabored. VSS. Upon checking sites, noted right groin to be slightly bleeding. Manual pressure held for 10mins    1215 Noted there to be slightly more bleeding, cath lab LAETA kiran to bedside to tighten woggle and change dressing. Noted site to not be bleeding upon changing dressing. Patient tolerated well. Resp easy and unlabored. VSS.     1225 Patient resting in bed, resp easy and unlabored. Sites clean dry and intact x3.      1238 Repot called to [facility]. 1240 Patient meets criteria for discharge from PACU at this time. Transported to  in stable condition.

## 2022-11-21 NOTE — PROGRESS NOTES
Discharge teaching and instructions  completed with patient using teachback method. AVS reviewed. Prescriptions escribed to patient's pharmacy. Patient voiced understanding regarding prescriptions, follow up appointments, and care of self at home. Discharged in a wheelchair to home with support per daughter.

## 2022-11-21 NOTE — DISCHARGE INSTRUCTIONS
POST ELECTROPHYSIOLOGY STUDY AND ABLATION INSTRUCTIONS    ACTIVITY     1. Do your normal activities except:      A. No heavy lifting more than 10 pounds for 3 days. B. No strenuous activity for 7 days. C. No driving for 24 hours. 2.  You may shower in morning, but no tub baths for 3 days. 3.  Ask your doctor when you can return to work. 4.  Remove the bandages from the puncture sites the next day if they have not already fallen off.  5.  You may cover the site with a regular bandage for another day to keep the site clean and dry. DIET:  You may resume your previous diet. CARE OF ACCESS SITE (Groin and Neck):     1. Examine the puncture site daily until it is well-healed. 2.  Some bruising is expected and will slowly disappear. 3.  Minor pain/soreness is also normal.    MEDICATIONS:  Follow the schedule of medicine given by your doctor. THINGS TO REPORT TO YOUR DOCTOR     1. Fever, swelling, bleeding. 2.  Redness at the puncture site. 3.  Warm or hot sensation at puncture site. .   4.  Purulence or drainage of fluid from the puncture site. 5.  Numbness, tingling or coldness in the affected foot. APPOINTMENTS:    Call your doctor at the following number to set up an appointment [facility]:    1. If you have a fever, drainage of fluid from the puncture site, persistent chest discomfort, or    uncontrolled heart rate. 2. If you cannot contact your doctor, go to the nearest emergency room. Following Atrial Fib/Flutter ablation:    You may have short spells of A-Fib after your ablation before your scheduled follow-up appointment with the EP physician. However, if the A-Fib lasts longer than 24 hours, please notify your physician. IMPORTANT< IMPORTANT< IMPORTANT    Resume your anticoagulant the evening of the procedure and as prior to admission the following day.

## 2022-11-21 NOTE — PROGRESS NOTES
6793  Pt admitted to  2E08 ambulatory for Atrial Fib ablation. Pt NPO. Vital signs obtained. Assessment and data collection initiated. Oriented to room. Policies and procedures for 2E explained   All questions answered with no further questions at this time. Fall prevention and safety precautions discussed with patient. 0735  Pt to cath lab per bed  1250  Pt ret'd to 2E08 post At fib ablation. Right IJ with quikclot and transparent drsg. Right femerol vein site with woggle and figure 8 stitch. Left femerol vein site with woggle and figure 8 stitch. IIV 0.9NS cont'd  Daughter at bedside. Pt with stabbing discomfort at right groin and right neck of a \"7\". 46 [facility]  Dr Kang Perkins in to review results of procedure with pt and daughter. HOB elevated 30 degrees. Ice pack applied to right femerol site. 1330  Pt taking diet/fluids - rajiv well.  IV 0.9NS discont'd. 1415  Woggle loosened  1445  Woggle and figure 8 stitches removed from groins bilat. Small ooze noted Right femerol site - Quikclot patch applied with transparent drsg and manual pressure held times 10 minutes. Left femerol site covered with Quikclot and transparent drsg, no bleeding noted.
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
## 2022-11-21 NOTE — PROGRESS NOTES
Elsie Vasquez underwent uneventful Atrial flutter and atrial fibrillation ablation. He was seen and examined post operatively. Imaging and graphics reviewed. The vascular access site soft and non-tender. No major complaints. Hemodynamically stable, tolerating food and ambulating. No acute concerns. Postoperative care and follow up discussed. Contact information provided. Can be safely discharged home. Resume all medications with following changes*. Patient is in agreement. Discussed with nursing staff. *Start Protonix 40 mg daily for a month.     Christina Erickson MD, [facility], [facility] on 11/21/2022 at 4:54 PM

## 2022-11-21 NOTE — ANESTHESIA POSTPROCEDURE EVALUATION
Department of Anesthesiology  Postprocedure Note    Patient: Susanne Rob  MRN: [id number]  YOB: 1965  Date of evaluation: 11/21/2022      Procedure Summary     Date: 11/21/22 Room / Location: [facility] CATH LAB    Anesthesia Start: [id number] Anesthesia Stop: 7024    Procedure: STR ABLATION WITH ANESTHESIA Diagnosis:     Scheduled Providers: Ayah Mott DO; ISABELLA Werner - CRNA Responsible Provider: Ayah Mott DO    Anesthesia Type: general ASA Status: 3          Anesthesia Type: No value filed.     Nathan Phase I: Nathan Score: 10    Nathan Phase II:        Anesthesia Post Evaluation    Patient location during evaluation: PACU  Patient participation: complete - patient participated  Level of consciousness: awake and alert  Pain score: 2  Airway patency: patent  Nausea & Vomiting: no nausea and no vomiting  Complications: no  Cardiovascular status: hemodynamically stable and blood pressure returned to baseline  Respiratory status: spontaneous ventilation, room air and acceptable  Hydration status: stable

## 2022-11-22 ENCOUNTER — TELEPHONE (OUTPATIENT)
Dept: CARDIOLOGY CLINIC | Age: 57
End: 2022-11-22

## 2022-11-22 ENCOUNTER — TELEPHONE (OUTPATIENT)
Dept: FAMILY MEDICINE CLINIC | Age: 57
End: 2022-11-22

## 2022-11-22 ENCOUNTER — HOSPITAL ENCOUNTER (INPATIENT)
Age: 57
LOS: 1 days | Discharge: HOME | DRG: 940 | End: 2022-11-24
Attending: STUDENT IN AN ORGANIZED HEALTH CARE EDUCATION/TRAINING PROGRAM | Admitting: STUDENT IN AN ORGANIZED HEALTH CARE EDUCATION/TRAINING PROGRAM
Payer: COMMERCIAL

## 2022-11-22 DIAGNOSIS — R07.9 CHEST PAIN, UNSPECIFIED TYPE: ICD-10-CM

## 2022-11-22 DIAGNOSIS — F41.9 ANXIETY: Primary | ICD-10-CM

## 2022-11-22 DIAGNOSIS — Z98.890 HISTORY OF PRIOR ABLATION TREATMENT: ICD-10-CM

## 2022-11-22 DIAGNOSIS — R77.8 ELEVATED TROPONIN: ICD-10-CM

## 2022-11-22 PROCEDURE — 96374 THER/PROPH/DIAG INJ IV PUSH: CPT

## 2022-11-22 PROCEDURE — 99285 EMERGENCY DEPT VISIT HI MDM: CPT

## 2022-11-22 PROCEDURE — 96375 TX/PRO/DX INJ NEW DRUG ADDON: CPT

## 2022-11-22 PROCEDURE — 93005 ELECTROCARDIOGRAM TRACING: CPT | Performed by: NURSE PRACTITIONER

## 2022-11-22 NOTE — TELEPHONE ENCOUNTER
Care Transitions Initial Follow Up Call    Outreach made within 2 business days of discharge: Yes    Patient: Stacy Harris Patient : 1965   MRN: [id number]  Reason for Admission: There are no discharge diagnoses documented for the most recent discharge. Discharge Date: 22       Spoke with: Attempted to contact patient NAHUY.     Discharge department/facility: [address]       Scheduled appointment with PCP within 7-14 days    Follow Up  Future Appointments   Date Time Provider Tate Hunt   2022  3:00 PM SCHEDULE, [facility] PACER NURSE N [facility] PACER [address]   2022  3:40 PM Isabelle Ortega MD N [facility]. [address]   2022  4:00 PM STR CT IMAGING RM1  [facility] STRZ OUT EXP STR Radiolog   2022  2:15 PM Dana Swan MD N [facility] Heart [address]   5/15/2023  3:30 PM River Munoz APRN - CNP [address] [place] [address]   2023 12:45 PM SCHEDULE, [facility] PACER NURSE N [facility] PACER [address]   2023  1:00 PM Ashly Flores MD N [facility] Heart [facility] ([address] [place] [address])

## 2022-11-22 NOTE — TELEPHONE ENCOUNTER
PT IS S/P AFIB ABLATION FROM YESTERDAY    NEED TO CALL PT AFTER 8 AM TO SEE HOW HE IS DOING TODAY       Summary:  Successful electric isolation of pulmonary veins using radiofrequency catheter ablation. Successful cavo-tricuspid isthmus ablation with bidirectional block. Successful electrical isolation of left atrial posterior wall (box lesion). Normal sinus node, AV node and His Purkinje functions. Synchronized electric cardioversion for atrial fibrillation. Recommendations:   Observation for 2 hours. Resume antiarrhythmic drugs, metoprolol and anticoagulation. Proton pump inhibitors for a month. Post operative care per protocol.          Electronically signed by Lance Martínez MD, Miles Mas on 11/21/2022 at 12:25 PM

## 2022-11-23 ENCOUNTER — APPOINTMENT (OUTPATIENT)
Dept: CT IMAGING | Age: 57
DRG: 940 | End: 2022-11-23
Payer: COMMERCIAL

## 2022-11-23 ENCOUNTER — APPOINTMENT (OUTPATIENT)
Dept: GENERAL RADIOLOGY | Age: 57
DRG: 940 | End: 2022-11-23
Payer: COMMERCIAL

## 2022-11-23 PROBLEM — R77.8 ELEVATED TROPONIN: Status: ACTIVE | Noted: 2022-11-23

## 2022-11-23 PROBLEM — R07.89 CHEST PAIN, ATYPICAL: Status: ACTIVE | Noted: 2022-11-23

## 2022-11-23 PROBLEM — K21.9 GASTROESOPHAGEAL REFLUX DISEASE: Status: ACTIVE | Noted: 2022-11-23

## 2022-11-23 PROBLEM — N18.32 STAGE 3B CHRONIC KIDNEY DISEASE (CKD) (HCC): Status: ACTIVE | Noted: 2022-11-23

## 2022-11-23 PROBLEM — R79.89 ELEVATED TROPONIN: Status: ACTIVE | Noted: 2022-11-23

## 2022-11-23 LAB
ALBUMIN SERPL-MCNC: 3.4 G/DL (ref 3.5–5.1)
ALBUMIN SERPL-MCNC: 3.7 G/DL (ref 3.5–5.1)
ALP BLD-CCNC: 84 U/L (ref 38–126)
ALP BLD-CCNC: 93 U/L (ref 38–126)
ALT SERPL-CCNC: 23 U/L (ref 11–66)
ALT SERPL-CCNC: 26 U/L (ref 11–66)
ANION GAP SERPL CALCULATED.3IONS-SCNC: 10 MEQ/L (ref 8–16)
ANION GAP SERPL CALCULATED.3IONS-SCNC: 13 MEQ/L (ref 8–16)
AST SERPL-CCNC: 40 U/L (ref 5–40)
AST SERPL-CCNC: 46 U/L (ref 5–40)
BASOPHILS # BLD: 0.4 %
BASOPHILS ABSOLUTE: 0 THOU/MM3 (ref 0–0.1)
BILIRUB SERPL-MCNC: 0.4 MG/DL (ref 0.3–1.2)
BILIRUB SERPL-MCNC: 0.5 MG/DL (ref 0.3–1.2)
BILIRUBIN DIRECT: < 0.2 MG/DL (ref 0–0.3)
BILIRUBIN DIRECT: < 0.2 MG/DL (ref 0–0.3)
BUN BLDV-MCNC: 38 MG/DL (ref 7–22)
BUN BLDV-MCNC: 39 MG/DL (ref 7–22)
CALCIUM SERPL-MCNC: 8.5 MG/DL (ref 8.5–10.5)
CALCIUM SERPL-MCNC: 8.7 MG/DL (ref 8.5–10.5)
CHLORIDE BLD-SCNC: 104 MEQ/L (ref 98–111)
CHLORIDE BLD-SCNC: 106 MEQ/L (ref 98–111)
CO2: 21 MEQ/L (ref 23–33)
CO2: 24 MEQ/L (ref 23–33)
CREAT SERPL-MCNC: 2.7 MG/DL (ref 0.4–1.2)
CREAT SERPL-MCNC: 2.8 MG/DL (ref 0.4–1.2)
CREATININE URINE: 90.8 MG/DL
EKG ATRIAL RATE: 79 BPM
EKG P AXIS: 52 DEGREES
EKG P-R INTERVAL: 202 MS
EKG Q-T INTERVAL: 398 MS
EKG QRS DURATION: 88 MS
EKG QTC CALCULATION (BAZETT): 456 MS
EKG R AXIS: 33 DEGREES
EKG T AXIS: 88 DEGREES
EKG VENTRICULAR RATE: 79 BPM
EOSINOPHIL # BLD: 1.3 %
EOSINOPHILS ABSOLUTE: 0.1 THOU/MM3 (ref 0–0.4)
ERYTHROCYTE [DISTWIDTH] IN BLOOD BY AUTOMATED COUNT: 13.8 % (ref 11.5–14.5)
ERYTHROCYTE [DISTWIDTH] IN BLOOD BY AUTOMATED COUNT: 52.7 FL (ref 35–45)
GFR SERPL CREATININE-BSD FRML MDRD: 25 ML/MIN/1.73M2
GFR SERPL CREATININE-BSD FRML MDRD: 27 ML/MIN/1.73M2
GLUCOSE BLD-MCNC: 102 MG/DL (ref 70–108)
GLUCOSE BLD-MCNC: 190 MG/DL (ref 70–108)
GLUCOSE BLD-MCNC: 200 MG/DL (ref 70–108)
GLUCOSE BLD-MCNC: 205 MG/DL (ref 70–108)
GLUCOSE BLD-MCNC: 84 MG/DL (ref 70–108)
GLUCOSE BLD-MCNC: 94 MG/DL (ref 70–108)
HCT VFR BLD CALC: 34.5 % (ref 42–52)
HEMOGLOBIN: 11.2 GM/DL (ref 14–18)
IMMATURE GRANS (ABS): 0.03 THOU/MM3 (ref 0–0.07)
IMMATURE GRANULOCYTES: 0.3 %
INR BLD: 1.69 (ref 0.85–1.13)
INR BLD: 1.85 (ref 0.85–1.13)
LIPASE: 50.6 U/L (ref 5.6–51.3)
LIPASE: 65.3 U/L (ref 5.6–51.3)
LYMPHOCYTES # BLD: 6.1 %
LYMPHOCYTES ABSOLUTE: 0.6 THOU/MM3 (ref 1–4.8)
MAGNESIUM: 2.1 MG/DL (ref 1.6–2.4)
MCH RBC QN AUTO: 33.9 PG (ref 26–33)
MCHC RBC AUTO-ENTMCNC: 32.5 GM/DL (ref 32.2–35.5)
MCV RBC AUTO: 104.5 FL (ref 80–94)
MONOCYTES # BLD: 7.1 %
MONOCYTES ABSOLUTE: 0.7 THOU/MM3 (ref 0.4–1.3)
NUCLEATED RED BLOOD CELLS: 0 /100 WBC
OSMOLALITY CALCULATION: 291 MOSMOL/KG (ref 275–300)
PLATELET # BLD: 106 THOU/MM3 (ref 130–400)
PMV BLD AUTO: 10 FL (ref 9.4–12.4)
POTASSIUM SERPL-SCNC: 3.9 MEQ/L (ref 3.5–5.2)
POTASSIUM SERPL-SCNC: 4.4 MEQ/L (ref 3.5–5.2)
PRO-BNP: ABNORMAL PG/ML (ref 0–900)
RBC # BLD: 3.3 MILL/MM3 (ref 4.7–6.1)
SEG NEUTROPHILS: 84.8 %
SEGMENTED NEUTROPHILS ABSOLUTE COUNT: 8.8 THOU/MM3 (ref 1.8–7.7)
SODIUM BLD-SCNC: 138 MEQ/L (ref 135–145)
SODIUM BLD-SCNC: 140 MEQ/L (ref 135–145)
SODIUM URINE: 68 MEQ/L
TOTAL PROTEIN: 5.5 G/DL (ref 6.1–8)
TOTAL PROTEIN: 6 G/DL (ref 6.1–8)
TROPONIN T: 4.45 NG/ML
TROPONIN T: 4.62 NG/ML
TROPONIN T: 5.31 NG/ML
WBC # BLD: 10.4 THOU/MM3 (ref 4.8–10.8)

## 2022-11-23 PROCEDURE — 71045 X-RAY EXAM CHEST 1 VIEW: CPT

## 2022-11-23 PROCEDURE — 93005 ELECTROCARDIOGRAM TRACING: CPT | Performed by: INTERNAL MEDICINE

## 2022-11-23 PROCEDURE — 83735 ASSAY OF MAGNESIUM: CPT

## 2022-11-23 PROCEDURE — 85025 COMPLETE CBC W/AUTO DIFF WBC: CPT

## 2022-11-23 PROCEDURE — 80053 COMPREHEN METABOLIC PANEL: CPT

## 2022-11-23 PROCEDURE — 84300 ASSAY OF URINE SODIUM: CPT

## 2022-11-23 PROCEDURE — 82948 REAGENT STRIP/BLOOD GLUCOSE: CPT

## 2022-11-23 PROCEDURE — 6370000000 HC RX 637 (ALT 250 FOR IP)

## 2022-11-23 PROCEDURE — 6370000000 HC RX 637 (ALT 250 FOR IP): Performed by: NURSE PRACTITIONER

## 2022-11-23 PROCEDURE — 6370000000 HC RX 637 (ALT 250 FOR IP): Performed by: INTERNAL MEDICINE

## 2022-11-23 PROCEDURE — 82570 ASSAY OF URINE CREATININE: CPT

## 2022-11-23 PROCEDURE — 99223 1ST HOSP IP/OBS HIGH 75: CPT | Performed by: INTERNAL MEDICINE

## 2022-11-23 PROCEDURE — 94669 MECHANICAL CHEST WALL OSCILL: CPT

## 2022-11-23 PROCEDURE — 93307 TTE W/O DOPPLER COMPLETE: CPT

## 2022-11-23 PROCEDURE — 94760 N-INVAS EAR/PLS OXIMETRY 1: CPT

## 2022-11-23 PROCEDURE — 2140000000 HC CCU INTERMEDIATE R&B

## 2022-11-23 PROCEDURE — 2500000003 HC RX 250 WO HCPCS: Performed by: NURSE PRACTITIONER

## 2022-11-23 PROCEDURE — 99223 1ST HOSP IP/OBS HIGH 75: CPT | Performed by: STUDENT IN AN ORGANIZED HEALTH CARE EDUCATION/TRAINING PROGRAM

## 2022-11-23 PROCEDURE — 2580000003 HC RX 258

## 2022-11-23 PROCEDURE — 6360000002 HC RX W HCPCS: Performed by: NURSE PRACTITIONER

## 2022-11-23 PROCEDURE — 85610 PROTHROMBIN TIME: CPT

## 2022-11-23 PROCEDURE — 36415 COLL VENOUS BLD VENIPUNCTURE: CPT

## 2022-11-23 PROCEDURE — 84484 ASSAY OF TROPONIN QUANT: CPT

## 2022-11-23 PROCEDURE — 83690 ASSAY OF LIPASE: CPT

## 2022-11-23 PROCEDURE — 83880 ASSAY OF NATRIURETIC PEPTIDE: CPT

## 2022-11-23 PROCEDURE — 93010 ELECTROCARDIOGRAM REPORT: CPT | Performed by: INTERNAL MEDICINE

## 2022-11-23 PROCEDURE — 82248 BILIRUBIN DIRECT: CPT

## 2022-11-23 RX ORDER — SODIUM CHLORIDE 0.9 % (FLUSH) 0.9 %
5-40 SYRINGE (ML) INJECTION EVERY 12 HOURS SCHEDULED
Status: DISCONTINUED | OUTPATIENT
Start: 2022-11-23 | End: 2022-11-24 | Stop reason: HOSPADM

## 2022-11-23 RX ORDER — ONDANSETRON 2 MG/ML
4 INJECTION INTRAMUSCULAR; INTRAVENOUS EVERY 6 HOURS PRN
Status: DISCONTINUED | OUTPATIENT
Start: 2022-11-23 | End: 2022-11-24 | Stop reason: HOSPADM

## 2022-11-23 RX ORDER — ONDANSETRON 4 MG/1
4 TABLET, ORALLY DISINTEGRATING ORAL EVERY 8 HOURS PRN
Status: DISCONTINUED | OUTPATIENT
Start: 2022-11-23 | End: 2022-11-24 | Stop reason: HOSPADM

## 2022-11-23 RX ORDER — NICOTINE 21 MG/24HR
1 PATCH, TRANSDERMAL 24 HOURS TRANSDERMAL DAILY
Status: DISCONTINUED | OUTPATIENT
Start: 2022-11-23 | End: 2022-11-24 | Stop reason: HOSPADM

## 2022-11-23 RX ORDER — OXYCODONE HYDROCHLORIDE AND ACETAMINOPHEN 5; 325 MG/1; MG/1
2 TABLET ORAL EVERY 4 HOURS PRN
Status: DISCONTINUED | OUTPATIENT
Start: 2022-11-23 | End: 2022-11-24 | Stop reason: HOSPADM

## 2022-11-23 RX ORDER — ALPRAZOLAM 0.5 MG/1
0.5 TABLET ORAL NIGHTLY PRN
Status: DISCONTINUED | OUTPATIENT
Start: 2022-11-23 | End: 2022-11-24 | Stop reason: HOSPADM

## 2022-11-23 RX ORDER — MEXILETINE HYDROCHLORIDE 150 MG/1
150 CAPSULE ORAL 3 TIMES DAILY
Status: DISCONTINUED | OUTPATIENT
Start: 2022-11-23 | End: 2022-11-24 | Stop reason: HOSPADM

## 2022-11-23 RX ORDER — LORAZEPAM 2 MG/ML
0.5 INJECTION INTRAMUSCULAR ONCE
Status: COMPLETED | OUTPATIENT
Start: 2022-11-23 | End: 2022-11-23

## 2022-11-23 RX ORDER — ACETAMINOPHEN 325 MG/1
650 TABLET ORAL EVERY 6 HOURS PRN
Status: DISCONTINUED | OUTPATIENT
Start: 2022-11-23 | End: 2022-11-24 | Stop reason: HOSPADM

## 2022-11-23 RX ORDER — POTASSIUM CHLORIDE 20 MEQ/1
20 TABLET, EXTENDED RELEASE ORAL DAILY
Status: DISCONTINUED | OUTPATIENT
Start: 2022-11-23 | End: 2022-11-24 | Stop reason: HOSPADM

## 2022-11-23 RX ORDER — MORPHINE SULFATE 2 MG/ML
2 INJECTION, SOLUTION INTRAMUSCULAR; INTRAVENOUS ONCE
Status: COMPLETED | OUTPATIENT
Start: 2022-11-23 | End: 2022-11-23

## 2022-11-23 RX ORDER — AMIODARONE HYDROCHLORIDE 200 MG/1
200 TABLET ORAL DAILY
Status: DISCONTINUED | OUTPATIENT
Start: 2022-11-23 | End: 2022-11-24 | Stop reason: HOSPADM

## 2022-11-23 RX ORDER — MAGNESIUM SULFATE IN WATER 40 MG/ML
2000 INJECTION, SOLUTION INTRAVENOUS PRN
Status: DISCONTINUED | OUTPATIENT
Start: 2022-11-23 | End: 2022-11-24 | Stop reason: HOSPADM

## 2022-11-23 RX ORDER — BUMETANIDE 1 MG/1
2 TABLET ORAL DAILY
Status: DISCONTINUED | OUTPATIENT
Start: 2022-11-23 | End: 2022-11-24 | Stop reason: HOSPADM

## 2022-11-23 RX ORDER — ATORVASTATIN CALCIUM 80 MG/1
80 TABLET, FILM COATED ORAL NIGHTLY
Status: DISCONTINUED | OUTPATIENT
Start: 2022-11-23 | End: 2022-11-24 | Stop reason: HOSPADM

## 2022-11-23 RX ORDER — HYDROXYZINE HYDROCHLORIDE 25 MG/1
25 TABLET, FILM COATED ORAL NIGHTLY PRN
Status: DISCONTINUED | OUTPATIENT
Start: 2022-11-23 | End: 2022-11-24 | Stop reason: HOSPADM

## 2022-11-23 RX ORDER — ACETAMINOPHEN 650 MG/1
650 SUPPOSITORY RECTAL EVERY 6 HOURS PRN
Status: DISCONTINUED | OUTPATIENT
Start: 2022-11-23 | End: 2022-11-24 | Stop reason: HOSPADM

## 2022-11-23 RX ORDER — MORPHINE SULFATE 2 MG/ML
2 INJECTION, SOLUTION INTRAMUSCULAR; INTRAVENOUS EVERY 4 HOURS PRN
Status: DISCONTINUED | OUTPATIENT
Start: 2022-11-23 | End: 2022-11-24 | Stop reason: HOSPADM

## 2022-11-23 RX ORDER — DEXTROSE MONOHYDRATE 100 MG/ML
INJECTION, SOLUTION INTRAVENOUS CONTINUOUS PRN
Status: DISCONTINUED | OUTPATIENT
Start: 2022-11-23 | End: 2022-11-24 | Stop reason: HOSPADM

## 2022-11-23 RX ORDER — MORPHINE SULFATE 4 MG/ML
4 INJECTION, SOLUTION INTRAMUSCULAR; INTRAVENOUS ONCE
Status: COMPLETED | OUTPATIENT
Start: 2022-11-23 | End: 2022-11-23

## 2022-11-23 RX ORDER — INSULIN GLARGINE 100 [IU]/ML
30 INJECTION, SOLUTION SUBCUTANEOUS NIGHTLY
Status: DISCONTINUED | OUTPATIENT
Start: 2022-11-23 | End: 2022-11-24 | Stop reason: HOSPADM

## 2022-11-23 RX ORDER — METOPROLOL SUCCINATE 100 MG/1
100 TABLET, EXTENDED RELEASE ORAL DAILY
Status: DISCONTINUED | OUTPATIENT
Start: 2022-11-23 | End: 2022-11-24 | Stop reason: HOSPADM

## 2022-11-23 RX ORDER — INSULIN LISPRO 100 [IU]/ML
0-4 INJECTION, SOLUTION INTRAVENOUS; SUBCUTANEOUS
Status: DISCONTINUED | OUTPATIENT
Start: 2022-11-23 | End: 2022-11-24 | Stop reason: HOSPADM

## 2022-11-23 RX ORDER — NITROGLYCERIN 20 MG/100ML
5-200 INJECTION INTRAVENOUS CONTINUOUS
Status: DISCONTINUED | OUTPATIENT
Start: 2022-11-23 | End: 2022-11-24 | Stop reason: HOSPADM

## 2022-11-23 RX ORDER — POLYETHYLENE GLYCOL 3350 17 G/17G
17 POWDER, FOR SOLUTION ORAL DAILY PRN
Status: DISCONTINUED | OUTPATIENT
Start: 2022-11-23 | End: 2022-11-24 | Stop reason: HOSPADM

## 2022-11-23 RX ORDER — INSULIN LISPRO 100 [IU]/ML
0-4 INJECTION, SOLUTION INTRAVENOUS; SUBCUTANEOUS NIGHTLY
Status: DISCONTINUED | OUTPATIENT
Start: 2022-11-23 | End: 2022-11-24 | Stop reason: HOSPADM

## 2022-11-23 RX ORDER — SODIUM CHLORIDE 0.9 % (FLUSH) 0.9 %
5-40 SYRINGE (ML) INJECTION PRN
Status: DISCONTINUED | OUTPATIENT
Start: 2022-11-23 | End: 2022-11-24 | Stop reason: HOSPADM

## 2022-11-23 RX ORDER — HYDRALAZINE HYDROCHLORIDE 50 MG/1
100 TABLET, FILM COATED ORAL 3 TIMES DAILY
Status: DISCONTINUED | OUTPATIENT
Start: 2022-11-23 | End: 2022-11-24 | Stop reason: HOSPADM

## 2022-11-23 RX ORDER — SODIUM CHLORIDE 9 MG/ML
INJECTION, SOLUTION INTRAVENOUS PRN
Status: DISCONTINUED | OUTPATIENT
Start: 2022-11-23 | End: 2022-11-24 | Stop reason: HOSPADM

## 2022-11-23 RX ORDER — ASPIRIN 81 MG/1
324 TABLET, CHEWABLE ORAL ONCE
Status: COMPLETED | OUTPATIENT
Start: 2022-11-23 | End: 2022-11-23

## 2022-11-23 RX ORDER — DOXAZOSIN 2 MG/1
2 TABLET ORAL DAILY
Status: DISCONTINUED | OUTPATIENT
Start: 2022-11-23 | End: 2022-11-24 | Stop reason: HOSPADM

## 2022-11-23 RX ORDER — ONDANSETRON 2 MG/ML
4 INJECTION INTRAMUSCULAR; INTRAVENOUS ONCE
Status: COMPLETED | OUTPATIENT
Start: 2022-11-23 | End: 2022-11-23

## 2022-11-23 RX ORDER — PANTOPRAZOLE SODIUM 40 MG/1
40 TABLET, DELAYED RELEASE ORAL
Status: DISCONTINUED | OUTPATIENT
Start: 2022-11-23 | End: 2022-11-24 | Stop reason: HOSPADM

## 2022-11-23 RX ADMIN — MEXILETINE HYDROCHLORIDE 150 MG: 150 CAPSULE ORAL at 19:55

## 2022-11-23 RX ADMIN — DOXAZOSIN 2 MG: 2 TABLET ORAL at 10:16

## 2022-11-23 RX ADMIN — OXYCODONE AND ACETAMINOPHEN 2 TABLET: 5; 325 TABLET ORAL at 19:54

## 2022-11-23 RX ADMIN — OXYCODONE AND ACETAMINOPHEN 2 TABLET: 5; 325 TABLET ORAL at 15:19

## 2022-11-23 RX ADMIN — TICAGRELOR 90 MG: 90 TABLET ORAL at 19:54

## 2022-11-23 RX ADMIN — NITROGLYCERIN 5 MCG/MIN: 20 INJECTION INTRAVENOUS at 02:52

## 2022-11-23 RX ADMIN — ALPRAZOLAM 0.5 MG: 0.5 TABLET ORAL at 23:19

## 2022-11-23 RX ADMIN — PANTOPRAZOLE SODIUM 40 MG: 40 TABLET, DELAYED RELEASE ORAL at 06:40

## 2022-11-23 RX ADMIN — HYDRALAZINE HYDROCHLORIDE 100 MG: 50 TABLET, FILM COATED ORAL at 10:17

## 2022-11-23 RX ADMIN — SODIUM CHLORIDE, PRESERVATIVE FREE 10 ML: 5 INJECTION INTRAVENOUS at 19:56

## 2022-11-23 RX ADMIN — MEXILETINE HYDROCHLORIDE 150 MG: 150 CAPSULE ORAL at 14:35

## 2022-11-23 RX ADMIN — MORPHINE SULFATE 2 MG: 2 INJECTION, SOLUTION INTRAMUSCULAR; INTRAVENOUS at 09:03

## 2022-11-23 RX ADMIN — ONDANSETRON 4 MG: 2 INJECTION INTRAMUSCULAR; INTRAVENOUS at 02:45

## 2022-11-23 RX ADMIN — SODIUM CHLORIDE, PRESERVATIVE FREE 10 ML: 5 INJECTION INTRAVENOUS at 09:15

## 2022-11-23 RX ADMIN — HYDRALAZINE HYDROCHLORIDE 100 MG: 50 TABLET, FILM COATED ORAL at 19:55

## 2022-11-23 RX ADMIN — HYDROXYZINE HYDROCHLORIDE 25 MG: 25 TABLET, FILM COATED ORAL at 19:55

## 2022-11-23 RX ADMIN — HYDRALAZINE HYDROCHLORIDE 100 MG: 50 TABLET, FILM COATED ORAL at 14:35

## 2022-11-23 RX ADMIN — POTASSIUM CHLORIDE 20 MEQ: 1500 TABLET, EXTENDED RELEASE ORAL at 12:07

## 2022-11-23 RX ADMIN — LIDOCAINE HYDROCHLORIDE: 20 SOLUTION ORAL; TOPICAL at 09:09

## 2022-11-23 RX ADMIN — ATORVASTATIN CALCIUM 80 MG: 80 TABLET, FILM COATED ORAL at 19:55

## 2022-11-23 RX ADMIN — METOPROLOL SUCCINATE 100 MG: 100 TABLET, FILM COATED, EXTENDED RELEASE ORAL at 10:18

## 2022-11-23 RX ADMIN — TICAGRELOR 90 MG: 90 TABLET ORAL at 10:18

## 2022-11-23 RX ADMIN — INSULIN LISPRO 1 UNITS: 100 INJECTION, SOLUTION INTRAVENOUS; SUBCUTANEOUS at 17:45

## 2022-11-23 RX ADMIN — ASPIRIN 81 MG 324 MG: 81 TABLET ORAL at 02:50

## 2022-11-23 RX ADMIN — INSULIN GLARGINE 30 UNITS: 100 INJECTION, SOLUTION SUBCUTANEOUS at 21:05

## 2022-11-23 RX ADMIN — MEXILETINE HYDROCHLORIDE 150 MG: 150 CAPSULE ORAL at 10:17

## 2022-11-23 RX ADMIN — AMIODARONE HYDROCHLORIDE 200 MG: 200 TABLET ORAL at 10:16

## 2022-11-23 RX ADMIN — MORPHINE SULFATE 4 MG: 4 INJECTION, SOLUTION INTRAMUSCULAR; INTRAVENOUS at 02:49

## 2022-11-23 RX ADMIN — LORAZEPAM 0.5 MG: 2 INJECTION INTRAMUSCULAR; INTRAVENOUS at 01:16

## 2022-11-23 ASSESSMENT — LIFESTYLE VARIABLES
HOW OFTEN DO YOU HAVE A DRINK CONTAINING ALCOHOL: NEVER
HOW MANY STANDARD DRINKS CONTAINING ALCOHOL DO YOU HAVE ON A TYPICAL DAY: PATIENT DOES NOT DRINK

## 2022-11-23 ASSESSMENT — PAIN DESCRIPTION - PAIN TYPE
TYPE: ACUTE PAIN

## 2022-11-23 ASSESSMENT — ENCOUNTER SYMPTOMS
SORE THROAT: 0
CHOKING: 0
EYE PAIN: 0
COUGH: 0
WHEEZING: 0
RHINORRHEA: 0
VOMITING: 0
NAUSEA: 0
DIARRHEA: 0
CONSTIPATION: 0
COLOR CHANGE: 0
EYE REDNESS: 0
BACK PAIN: 0
ABDOMINAL PAIN: 0
SHORTNESS OF BREATH: 1
CHEST TIGHTNESS: 1
TROUBLE SWALLOWING: 0

## 2022-11-23 ASSESSMENT — PAIN DESCRIPTION - ONSET
ONSET: ON-GOING

## 2022-11-23 ASSESSMENT — PAIN DESCRIPTION - FREQUENCY
FREQUENCY: INTERMITTENT
FREQUENCY: CONTINUOUS
FREQUENCY: INTERMITTENT
FREQUENCY: CONTINUOUS
FREQUENCY: INTERMITTENT
FREQUENCY: CONTINUOUS
FREQUENCY: INTERMITTENT
FREQUENCY: CONTINUOUS
FREQUENCY: INTERMITTENT

## 2022-11-23 ASSESSMENT — PAIN SCALES - GENERAL
PAINLEVEL_OUTOF10: 8
PAINLEVEL_OUTOF10: 8
PAINLEVEL_OUTOF10: 6
PAINLEVEL_OUTOF10: 5
PAINLEVEL_OUTOF10: 7
PAINLEVEL_OUTOF10: 5
PAINLEVEL_OUTOF10: 8
PAINLEVEL_OUTOF10: 6
PAINLEVEL_OUTOF10: 7
PAINLEVEL_OUTOF10: 7
PAINLEVEL_OUTOF10: 6
PAINLEVEL_OUTOF10: 7
PAINLEVEL_OUTOF10: 5
PAINLEVEL_OUTOF10: 9
PAINLEVEL_OUTOF10: 7
PAINLEVEL_OUTOF10: 6
PAINLEVEL_OUTOF10: 7

## 2022-11-23 ASSESSMENT — PAIN DESCRIPTION - DESCRIPTORS
DESCRIPTORS: ACHING

## 2022-11-23 ASSESSMENT — PAIN DESCRIPTION - LOCATION
LOCATION: CHEST

## 2022-11-23 ASSESSMENT — PAIN - FUNCTIONAL ASSESSMENT
PAIN_FUNCTIONAL_ASSESSMENT: PREVENTS OR INTERFERES SOME ACTIVE ACTIVITIES AND ADLS
PAIN_FUNCTIONAL_ASSESSMENT: ACTIVITIES ARE NOT PREVENTED
PAIN_FUNCTIONAL_ASSESSMENT: PREVENTS OR INTERFERES SOME ACTIVE ACTIVITIES AND ADLS
PAIN_FUNCTIONAL_ASSESSMENT: PREVENTS OR INTERFERES SOME ACTIVE ACTIVITIES AND ADLS
PAIN_FUNCTIONAL_ASSESSMENT: 0-10
PAIN_FUNCTIONAL_ASSESSMENT: ACTIVITIES ARE NOT PREVENTED
PAIN_FUNCTIONAL_ASSESSMENT: PREVENTS OR INTERFERES SOME ACTIVE ACTIVITIES AND ADLS

## 2022-11-23 ASSESSMENT — PAIN DESCRIPTION - ORIENTATION
ORIENTATION: MID
ORIENTATION: LEFT;MID
ORIENTATION: MID
ORIENTATION: LEFT;MID
ORIENTATION: MID
ORIENTATION: LEFT;MID
ORIENTATION: UPPER
ORIENTATION: MID
ORIENTATION: MID
ORIENTATION: UPPER

## 2022-11-23 NOTE — PLAN OF CARE
Admitted, seen and examined by the nocturnist.    Presented to the ED with CP. Troponin Max 5.3, trending down to 4.45. Had recent ablation 11/22 for AFIB. EKG in NSR. Consult called to Dr. Donnie Lara.      Donny Rey, APRN - CNP

## 2022-11-23 NOTE — PROGRESS NOTES
Patient alert and oriented x 4. Pupils round, equal, with consensual response to light. Pupils size 3 mm and constricted to a size 2 mm. Patients upper extremities warm, appropriate for ethnicity, and dry. Can move about freely  without pain or discomfort. Capillary refill less than 3 seconds. Skin turgor  less than 3 seconds. Radial pulses are palpable. +2, and equal bilaterally . Brachial pulses are palpable. +2, and equal bilaterally. Hand grasp strong and equal bilaterally, arm drift negative. No numbness or tingling present. IV located right AC with a clean, dry. Intact dressing. No signs of redness, swelling, or irritation noted at IV site. Dressing is clean, dry and, intact. Heart sounds of irregularly irregular in rhythm and but normal in rate. Lung sounds clear but diminished to auscultation anteriorly, laterally, and posteriorly. Abdomen soft, round, but non-distended, without pain. Bowel sounds hypo-active in RLQ and active in RUQ, LLQ, LUQ quadrants. Skin of the lower extremities are warm, appropriate for ethnicity, and dry. Patient able to move extremities bilaterally, full ROM, with no pain. Pedal push and pull strong and equal bilaterally. No numbness, edema, tingling present to lower extremities bilaterally. Dorsalis pedis and posterior tibialis pulses palpable, strong, and equal bilaterally. Patient able to move in bed  without assistance. Patient sitting in. Bed alarm on with wheels locked. Call light and personal items within reach.

## 2022-11-23 NOTE — H&P
Internal Medicine Resident History and Physical          Patient: Michelle Call  : 1965  MRN: [id number]     Acct: [de-identified]    PCP: ISABELLA Goss CNP  Date of Admission: 2022  Date of Service: Pt seen/examined on 22  and Admitted to Inpatient with expected LOS greater than two midnights due to medical therapy. Assessment and Plan:    #Chest pain  Likely: Post ablation pain vs anxiety  Less likely however cannot rule out: NSTEMI  The patient recently had ablation procedure done 2022, he states that his heart was shocked 10 times. On labs he has a troponin of 5.3. Which I am attributing to the ablation procedure. EKG normal sinus rhythm. Plan:  -Cardiology has already been consulted by the ED. patient is status post ablation 2022. Per cardiology hold heparin and warfarin until they see the patient. Consider starting Heparin or Warfarin once Cardiology sees the patient! Also considering restarting home Bumex.  -Trend troponins every 3 hours, EKG in the AM    #Paroxysmal Atrial Fibrillation   on Warfarin s/p ablation in 2022*: Stable  Diagnosed in 2014. EKG on admit normal sinus. [id number] 4. Rate control at home:  metropolol 100 mg   . Rhythm control at home: Amiodarone 200 mg  ; No h/o amiodarone induced lung injury and Mexitil . On Warfarin  2022 TSH 2.650, 2022 free T4 1. 12. Plan:  -Cardiology consulted; for above appreciate recommendations  -Patient last dose warfarin 2022, consider restarting after speaking with Cardiology. -INR   -Order TSH with reflex free T4  -Daily BMP + serum magnesium  -Maintain potassium > 4.0; Initiate potassium replacement protocol  -Maintain magnesium > 2.0; Initiate magnesium replacement protocol     #Pre-renal BABAK Stage 1 on CKD Stage IIIB eGFR 25 mL/min/1.73m2: Active   Serum creatinine 2.8 on baseline 2.5. likely Secondary to CHF.    Plan:  -Order urine creatinine  -Order urine sodium  -Strict I&Os  -Avoid nephrotoxic substances  -BMP daily  -Will consider nephrology consult     #Obstructive CAD s/p CABG x 3 on 05/2014 and PCI LAD 10/2020: Stable  Takes Brilinta at home  Plan:  -Continue home medication Brilinta    #Chronic HFrEF with EF 25% on  04 /20/2022 , NYHA Class II: Stable  Diagnosed in  07 /16/2015 . Pro-BNP on 11/23/22 was ____ from baseline of 4272. CXR on 11/23/22 demonstrated minimal right basilar atelectasis otherwise stable findings and the comparison exam.. GDMT at home:  metoprolol, . Diuretics at home: bumetanide, .  2 mg daily   Plan:   -Holding Bumex in case cardiology plans on doing the procedure.  -Order pro-BNP  -Strict I&Os  -Daily weights  -Patient has AICD placed in 10/2014    #Well-controlled IDDMT2 A1c 7.1 in  11 /15/2022 : Active  Takes Lantus 45 units into the skin nightly. Last foot exam: 10 /06/2022  Plan:  - Hold ALL OGAs  -Start Low Dose SSI Algorithm  -If eating, start insulin glargine 30 U nightly  -Hypoglycemia protocol  -POCT glucose qAC/HS  -Diabetes education, diet, and exercise     # Hyperlipidemia : Stable  Last lipid panel 11 /15/2022: Tchol 115 mg/dL, LDL 51 mg/dL, HDL 40 mg/dL,  mg/dL. Takes Lipitor at home. Plan:   -Continue home medication atorvastatin 80 mg daily    #Uncontrolled Primary HTN: Active  BP: 153/81 mmHg. HR: 76 beats/min. Takes metoprolol and Bumex  Plan:   -Continue metoprolol, hold Bumex until cardiology sees    #Depression/anxiety  -Continue home hydroxyzine and Xanax    #GERD  -Continue home medication Protonix      =======================================================================      Chief Complaint: Chest pain, SOB, and anxiety    History Of Present Illness:  Stacy Harris is a 62 y.o. male with PMHx of CKD, HLD, HTN, CAD, paroxysmal atrial fibrillation, CHF,  who presents to [address] [place] [address] with chest pain and anxiety.   Patient states that he had a ablation procedure 11/22/2022 with 10 shocks to the heart. He said since then he has been having shortness of breath and chest pain all day yesterday and today. He states that the chest pain started first and then came that anxiety. He has pain had like this before but did not have heart attack at the time. Describes the pain as a soreness in his whole chest which is on and off. \"Feels like my heart was punched\". The episodes are constant unless he has something to help relax him. He took alprazolam and Vicodin at home however, it did not improve his pain. In the ED he was given Ativan and morphine which helped. He still rates his pain 9 out of 10. He denies it being worse with deep breathing. He does state he might have some acid coming up. However he denies any nausea or vomiting. States he is bloated. He denies any fever, chills, constipation, diarrhea, dysuria. ED course: In the ED his vitals were temperature 98.2, RR 17, heart rate 78, blood pressure 187/99, saturating 98% on room air. Chest x-ray was done which showed minimal right basilar atelectasis otherwise stable findings in comparison exam.  They spoke to cardiology in the ED who stated that they want to see the patient in the morning. Cardiology told the emergency room to hold the heparin until they see him.     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     [employer] dual icd      Neuromuscular disorder ([facility] [address]     Other disorders of kidney and ureter in diseases classified elsewhere     Paroxysmal atrial fibrillation ([facility] [address] 7/22/2014    V tach     V-tach     s/p ablation in dec 2014       Past Surgical History:        Procedure Laterality Date    CARDIAC CATHETERIZATION  3/20/14     [facility]    CARDIAC DEFIBRILLATOR PLACEMENT  10/13/2015    [employer] [place], MRI CONDITIONAL ICD    CORONARY ARTERY BYPASS GRAFT  5-9-14    3 bypass    EKG 12-LEAD  7/17/2015         OTHER SURGICAL HISTORY Left 10/21/14    Left Bursectomy, I & D Left Elbow - Dr. Karena Sylvester LAMINECTOMY,>2 [facility] N/A 1/16/2018    LUMBAR AND SACRAL LAMINECTOMY, REMOVAL OF INTRASPINAL TUMORS performed by Nicolás Jasmine MD at [address] [place] [address] harvest from legs       Medications Prior to Admission:   Prior to Admission medications    Medication Sig Start Date End Date Taking?  Authorizing Provider   atorvastatin (LIPITOR) 80 MG tablet TAKE 1 TABLET BY MOUTH EVERY NIGHT 11/21/22   ISABELLA Willett CNP   pantoprazole (PROTONIX) 40 MG tablet Take 1 tablet by mouth every morning (before breakfast) 11/21/22   Margot Laguna MD   doxazosin (CARDURA) 2 MG tablet TAKE 1 TABLET BY MOUTH DAILY 11/15/22   ISABELLA Willett CNP   Lancets ([address] [place]) [address] USE AS DIRECTED TWICE DAILY 11/15/22   ISABELLA Wileltt CNP   BRILINTA 90 MG TABS tablet TAKE 1 TABLET BY MOUTH TWICE DAILY 11/15/22   Fany Estrada MD   hydrOXYzine HCl (ATARAX) 25 MG tablet Take 1 tablet by mouth nightly as needed for Anxiety 11/15/22 11/25/22  ISABELLA Willett CNP   amiodarone (CORDARONE) 200 MG tablet Take 1 tablet by mouth daily 11/14/22   tSacy Leyva MD   ALPRAZolam Jose Gibson) 0.5 MG tablet TAKE 1 TABLET BY MOUTH AT BEDTIME FOR ANXIETY 10/24/22 4/22/23  ISABELLA Willett CNP   linagliptin (TRADJENTA) 5 MG tablet TAKE 1 TABLET BY MOUTH DAILY 10/3/22   ISABELLA Lopez CNP   potassium chloride (KLOR-CON M) 20 MEQ extended release tablet TAKE 1 TABLET BY MOUTH DAILY 10/3/22   ISABELLA Lopez CNP   bumetanide (BUMEX) 2 MG tablet Take 1 tablet by mouth in the morning. 8/10/22   oRbert Stein MD   doxazosin (CARDURA) 4 MG tablet Take 1 tablet by mouth in the morning. Patient not taking: Reported on 11/21/2022 7/20/22   Georgia Child MD   ranolazine (RANEXA) 500 MG extended release tablet Take 1 tablet by mouth in the morning and 1 tablet before bedtime.   Patient not taking: Reported on 11/21/2022 7/18/22   Ratna Og MD   insulin glargine (LANTUS SOLOSTAR) 100 UNIT/ML injection pen Inject 45 Units into the skin nightly 7/12/22   ISABELLA Lopez CNP   hydrALAZINE (APRESOLINE) 100 MG tablet TAKE 1 TABLET BY MOUTH THREE TIMES DAILY 6/14/22   Fany Acosta MD   blood glucose test strips (ONETOUCH ULTRA) strip USE AS DIRECTED TWICE DAILY 5/19/22   ISABELLA Lopez CNP   warfarin (COUMADIN) 5 MG tablet USE AS DIRECTED BY COUMADIN CLINIC 4/12/22   ISABELLA Lopez CNP   isosorbide mononitrate (IMDUR) 120 MG extended release tablet Take 1 tablet by mouth daily 4/6/22   ISABELLA Lopez CNP   metoprolol succinate (TOPROL XL) 100 MG extended release tablet Take 1 tablet by mouth daily 3/14/22   Ratna Og MD   mexiletine (MEXITIL) 150 MG capsule TAKE 2 CAPSULES THREE TIMES DAILY FOR ATRIAL FIBRILLATION 2/21/22   Fany Acosta MD   Blood Glucose Monitoring Suppl (ONE TOUCH ULTRA 2) w/Device KIT 1 kit by Does not apply route 2 times daily 11/22/21   ISABELLA Lopez CNP   Insulin Pen Needle 31G X 8 MM MISC 1 each by Does not apply route daily 11/22/21   ISABELLA Lopez CNP   losartan (COZAAR) 50 MG tablet TAKE 1 TABLET BY MOUTH DAILY  Patient not taking: Reported on 11/21/2022 6/21/21   Nila Gloria, DO   glucose blood VI test strips (PHIL CONTOUR TEST) strip 1 each by In Vitro route daily 1/5/17   Mickey Huang, APRN - CNP   acetaminophen 650 MG TABS Take 650 mg by mouth every 4 hours as needed 1/22/16   Debra Boston MD       Allergies:  Latex, Pcn [penicillins], and Zoloft [sertraline hcl]    Social History:    The patient currently lives by himself. Tobacco use:   reports that he has been smoking cigarettes. He started smoking about 42 years ago. He has a 20.00 pack-year smoking history. His smokeless tobacco use includes snuff. Alcohol use:   reports that he does not currently use alcohol. Drug use:  reports that he does not currently use drugs. Family History:   as follows:      Problem Relation Age of Onset    Diabetes Mother     High Blood Pressure Mother     Stroke Mother     Diabetes Father     Heart Disease Father     High Blood Pressure Father     Heart Disease Sister         CABG    Diabetes Maternal Grandmother     Diabetes Maternal Grandfather     Heart Disease Paternal Grandfather        Review of Systems:   Pertinent positives and negatives as noted in the HPI. Otherwise complete ROS negative. Physical Exam:    BP (!) 179/101   Pulse 77   Temp 98.2 °F (36.8 °C) (Oral)   Resp 16   Wt 290 lb (131.5 kg)   SpO2 99%   BMI 37.23 kg/m²       General appearance: No apparent distress, appears stated age. Eyes:  Pupils equal, round, and reactive to light. Conjunctivae/corneas clear. HENT: Head normal in appearance. External nares normal.  Oral mucosa moist without lesions. Hearing grossly intact. Neck: Supple, with full range of motion. Trachea midline. No gross JVD appreciated. Respiratory:  Normal respiratory effort. Clear to auscultation, bilaterally without rales or wheezes or rhonchi. Cardiovascular: Normal rate, regular rhythm with normal S1/S2 without murmurs. No lower extremity edema. Reproducable chest pain on palpation. Abdomen: Soft, non-tender, non-distended with normal bowel sounds.   Musculoskeletal: No joint swelling or tenderness. Normal tone. No abnormal movements. Skin: Warm and dry. No rashes or lesions. Neurologic:  No focal sensory/motor deficits in the upper and lower extremities. Cranial nerves:  grossly non-focal 2-12. Psychiatric: Alert and oriented, normal insight and thought content. Capillary Refill: Brisk,< 3 seconds. Peripheral Pulses: +2 palpable, equal bilaterally. Labs:     Recent Labs     11/21/22  0536 11/23/22  0050   WBC 7.1 10.4   HGB 12.1* 11.2*   HCT 36.6* 34.5*    106*     Recent Labs     11/21/22  0537 11/23/22  0050    138   K 3.6 3.9    104   CO2 21* 24   BUN 33* 39*   CREATININE 2.6* 2.8*   CALCIUM 8.3* 8.5     Recent Labs     11/23/22  0050   AST 46*   ALT 26   BILIDIR <0.2   BILITOT 0.4   ALKPHOS 93     Recent Labs     11/21/22  0536 11/23/22  0050   INR 2.19* 1.69*     Recent Labs     11/23/22  0050   TROPONINT 5.310*     No results for input(s): PROCAL in the last 72 hours. Lab Results   Component Value Date/Time    NITRU NEGATIVE 04/22/2022 03:45 PM    WBCUA 0-2 04/22/2022 03:45 PM    BACTERIA NONE SEEN 04/22/2022 03:45 PM    RBCUA 5-10 04/22/2022 03:45 PM    BLOODU NEGATIVE 04/22/2022 03:45 PM    SPECGRAV 1.016 04/22/2022 03:45 PM    GLUCOSEU NEGATIVE 09/24/2019 01:00 PM         Radiology:     XR CHEST PORTABLE   Final Result   Impression:   Minimal right basilar atelectasis otherwise stable findings in the    comparison exam.      This document has been electronically signed by: Spenser Calixto MD on    11/23/2022 01:18 AM             EKG:  I have reviewed the EKG with the following interpretation: Normal sinus rhythm    Diet: Diet NPO  DVT prophylaxis: Patient already took his warfarin for today. Holding warfarin as cardiology might start heparin tomorrow. Code Status: Full Code  Disposition: admit to stepdown    Thank you Francisca Hodgkins, APRN - BON for the opportunity to be involved in this patient's care.     Electronically signed by Leif Tinajero DO PGY1 on 11/23/2022 at 2:53 AM.     Case discussed with Attending, Dr. Houston Snyder.

## 2022-11-23 NOTE — PLAN OF CARE
Problem: Discharge Planning  Goal: Discharge to home or other facility with appropriate resources  Outcome: Progressing  Flowsheets (Taken 11/23/2022 0440)  Discharge to home or other facility with appropriate resources: Identify barriers to discharge with patient and caregiver     Problem: Pain  Goal: Verbalizes/displays adequate comfort level or baseline comfort level  Outcome: Progressing  Flowsheets (Taken 11/23/2022 0440)  Verbalizes/displays adequate comfort level or baseline comfort level:   Encourage patient to monitor pain and request assistance   Assess pain using appropriate pain scale     Problem: Safety - Adult  Goal: Free from fall injury  Outcome: Progressing  Flowsheets (Taken 11/23/2022 0440)  Free From Fall Injury: Instruct family/caregiver on patient safety     Problem: ABCDS Injury Assessment  Goal: Absence of physical injury  Outcome: Progressing  Flowsheets (Taken 11/23/2022 0440)  Absence of Physical Injury: Implement safety measures based on patient assessment

## 2022-11-23 NOTE — ED PROVIDER NOTES
[facility] Emergency Department    CHIEF COMPLAINT       Chief Complaint   Patient presents with    Shortness of Breath    Chest Pain       Nurses Notes reviewed and I agree except as noted in the HPI. HISTORY OF PRESENT ILLNESS    Ligia Soldeliazar andres 62 y.o. male who presents to the ED for evaluation of chest pain and shortness of breath. Patient had an ablation completed yesterday and says he has had left peristernal chest pain and difficulty taking a full deep breath since. He says he is anxious something is wrong and presented today for piece of mind. Denies headache, dizziness, cough, nausea, vomiting, bowel or bladder changes. HPI was provided by the patient    REVIEW OF SYSTEMS     Review of Systems   Constitutional:  Negative for chills, diaphoresis, fatigue and fever. HENT:  Negative for congestion, ear discharge, ear pain, rhinorrhea, sneezing, sore throat and trouble swallowing. Eyes:  Negative for pain, redness and visual disturbance. Respiratory:  Positive for chest tightness and shortness of breath. Negative for cough, choking and wheezing. Cardiovascular:  Positive for chest pain. Negative for palpitations and leg swelling. Gastrointestinal:  Negative for abdominal pain, constipation, diarrhea, nausea and vomiting. Genitourinary:  Negative for decreased urine volume, dysuria, frequency, hematuria and urgency. Musculoskeletal:  Negative for arthralgias, back pain, neck pain and neck stiffness. Skin:  Negative for color change, pallor and rash. Neurological:  Negative for dizziness, syncope, facial asymmetry, speech difficulty, weakness, light-headedness, numbness and headaches. Psychiatric/Behavioral:  Negative for confusion and decreased concentration. All other systems negative except as noted.       PAST MEDICAL HISTORY     Past Medical History:   Diagnosis Date    Acute systolic CHF (congestive heart failure) (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    Anxiety disorder     Arthritis     Atrial fibrillation (HCC)     CAD (coronary artery disease) 3/25/2014    s/p CABG in may 2014    Cardiomyopathy [facility])     Chronic kidney disease     Depression     Diabetes mellitus ([facility] [address]     Drug abuse ([facility] [address]     hx of cacaine abuse, stopped abusing drugs in 2012    GERD (gastroesophageal reflux disease)     H/O cardiac catheterization 4/30/2014    Hyperlipidemia     Hypertension     Intradural extramedullary spinal tumor     Lumbar spine tumor     Medtronic dual icd      Neuromuscular disorder ([facility] [address]     Other disorders of kidney and ureter in diseases classified elsewhere     Paroxysmal atrial fibrillation ([facility] [address] 7/22/2014    V tach     V-tach     s/p ablation in dec 2014       SURGICALHISTORY      has a past surgical history that includes Cardiac catheterization (3/20/14 ); Coronary artery bypass graft (5-9-14); other surgical history (Left, 10/21/14); EKG 12 Lead (7/17/2015); Cardiac defibrillator placement (10/13/2015); vascular surgery; pacemaker placement; and pr laminectomy,>2 sgmt,lumbar (N/A, 1/16/2018).     CURRENT MEDICATIONS       Current Discharge Medication List        CONTINUE these medications which have NOT CHANGED    Details   atorvastatin (LIPITOR) 80 MG tablet TAKE 1 TABLET BY MOUTH EVERY NIGHT  Qty: 30 tablet, Refills: 11      pantoprazole (PROTONIX) 40 MG tablet Take 1 tablet by mouth every morning (before breakfast)  Qty: 30 tablet, Refills: 0      !! doxazosin (CARDURA) 2 MG tablet TAKE 1 TABLET BY MOUTH DAILY  Qty: 90 tablet, Refills: 3      Lancets (ONETOUCH DELICA PLUS [id number]) MISC USE AS DIRECTED TWICE DAILY  Qty: 200 each, Refills: 3    Associated Diagnoses: Controlled type 2 diabetes mellitus with chronic kidney disease, without long-term current use of insulin, unspecified CKD stage (HCC)      BRILINTA 90 MG TABS tablet TAKE 1 TABLET BY MOUTH TWICE DAILY  Qty: 180 tablet, Refills: 3      hydrOXYzine HCl (ATARAX) 25 MG tablet Take 1 tablet by mouth nightly as needed for Anxiety  Qty: 30 tablet, Refills: 1    Associated Diagnoses: Other mixed anxiety disorders      amiodarone (CORDARONE) 200 MG tablet Take 1 tablet by mouth daily  Qty: 90 tablet, Refills: 3    Comments: **Patient requests 90 days supply**      ALPRAZolam (XANAX) 0.5 MG tablet TAKE 1 TABLET BY MOUTH AT BEDTIME FOR ANXIETY  Qty: 30 tablet, Refills: 5    Associated Diagnoses: Other mixed anxiety disorders      linagliptin (TRADJENTA) 5 MG tablet TAKE 1 TABLET BY MOUTH DAILY  Qty: 90 tablet, Refills: 3    Associated Diagnoses: Controlled type 2 diabetes mellitus with chronic kidney disease, without long-term current use of insulin, unspecified CKD stage (HCC)      potassium chloride (KLOR-CON M) 20 MEQ extended release tablet TAKE 1 TABLET BY MOUTH DAILY  Qty: 90 tablet, Refills: 3      bumetanide (BUMEX) 2 MG tablet Take 1 tablet by mouth in the morning. Qty: 90 tablet, Refills: 3      !! doxazosin (CARDURA) 4 MG tablet Take 1 tablet by mouth in the morning. Qty: 90 tablet, Refills: 1      ranolazine (RANEXA) 500 MG extended release tablet Take 1 tablet by mouth in the morning and 1 tablet before bedtime.   Qty: 180 tablet, Refills: 3      insulin glargine (LANTUS SOLOSTAR) 100 UNIT/ML injection pen Inject 45 Units into the skin nightly  Qty: 15 pen, Refills: 3    Associated Diagnoses: Controlled type 2 diabetes mellitus with chronic kidney disease, without long-term current use of insulin, unspecified CKD stage (HCC)      hydrALAZINE (APRESOLINE) 100 MG tablet TAKE 1 TABLET BY MOUTH THREE TIMES DAILY  Qty: 270 tablet, Refills: 3    Associated Diagnoses: Essential hypertension      !! blood glucose test strips (ONETOUCH ULTRA) strip USE AS DIRECTED TWICE DAILY  Qty: 200 strip, Refills: 3    Comments: **Patient requests 90 days supply**  Associated Diagnoses: Controlled type 2 diabetes mellitus with chronic kidney disease, without long-term current use of insulin, unspecified CKD stage (HCC)      warfarin (COUMADIN) 5 MG tablet USE AS DIRECTED BY COUMADIN CLINIC  Qty: 180 tablet, Refills: 5      isosorbide mononitrate (IMDUR) 120 MG extended release tablet Take 1 tablet by mouth daily  Qty: 90 tablet, Refills: 3    Associated Diagnoses: Coronary artery disease involving coronary bypass graft of native heart without angina pectoris      metoprolol succinate (TOPROL XL) 100 MG extended release tablet Take 1 tablet by mouth daily  Qty: 90 tablet, Refills: 3      mexiletine (MEXITIL) 150 MG capsule TAKE 2 CAPSULES THREE TIMES DAILY FOR ATRIAL FIBRILLATION  Qty: 540 capsule, Refills: 3      Blood Glucose Monitoring Suppl (ONE TOUCH ULTRA 2) w/Device KIT 1 kit by Does not apply route 2 times daily  Qty: 1 kit, Refills: 0    Associated Diagnoses: Controlled type 2 diabetes mellitus with chronic kidney disease, without long-term current use of insulin, unspecified CKD stage (formerly [facility])      Insulin Pen Needle 31G X 8 MM MISC 1 each by Does not apply route daily  Qty: 100 each, Refills: 3    Associated Diagnoses: Controlled type 2 diabetes mellitus with chronic kidney disease, without long-term current use of insulin, unspecified CKD stage (HCC)      losartan (COZAAR) 50 MG tablet TAKE 1 TABLET BY MOUTH DAILY  Qty: 30 tablet, Refills: 0      !! glucose blood VI test strips (PHIL CONTOUR TEST) strip 1 each by In Vitro route daily  Qty: 300 each, Refills: 3    Associated Diagnoses: Controlled type 2 diabetes mellitus without complication, without long-term current use of insulin (formerly [facility])      acetaminophen 650 MG TABS Take 650 mg by mouth every 4 hours as needed  Qty: 120 tablet, Refills: 3       !! - Potential duplicate medications found. Please discuss with provider. ALLERGIES     is allergic to latex, pcn [penicillins], and zoloft [sertraline hcl]. FAMILY HISTORY     He indicated that his mother is .  He indicated that his father is . He indicated that his sister is alive. He indicated that the status of his maternal grandmother is unknown. He indicated that the status of his maternal grandfather is unknown. He indicated that the status of his paternal grandfather is unknown.   family history includes Diabetes in his father, maternal grandfather, maternal grandmother, and mother; Heart Disease in his father, paternal grandfather, and sister; High Blood Pressure in his father and mother; Stroke in his mother. SOCIAL HISTORY       Social History     Socioeconomic History    Marital status:      Spouse name: Not on file    Number of children: 3    Years of education: 12    Highest education level: High school graduate   Occupational History     Employer: [employer]   Tobacco Use    Smoking status: Every Day     Packs/day: 0.50     Years: 40.00     Pack years: 20.00     Types: Cigarettes     Start date: 1980    Smokeless tobacco: Current     Types: Snuff   Vaping Use    Vaping Use: Never used   Substance and Sexual Activity    Alcohol use: Not Currently    Drug use: Not Currently    Sexual activity: Not Currently     Partners: Female   Other Topics Concern    Not on file   Social History Narrative    Not on file     Social Determinants of Health     Financial Resource Strain: Low Risk     Difficulty of Paying Living Expenses: Not hard at all   Food Insecurity: No Food Insecurity    Worried About Running Out of Food in the Last Year: Never true    Ran Out of Food in the Last Year: Never true   Transportation Needs: Not on file   Physical Activity: Not on file   Stress: Not on file   Social Connections: Not on file   Intimate Partner Violence: Not on file   Housing Stability: Not on file       PHYSICAL EXAM     INITIAL VITALS:  height is 6' 2\" (1.88 m) and weight is 293 lb 4.8 oz (133 kg). His oral temperature is 99.4 °F (37.4 °C). His blood pressure is 153/81 (abnormal) and his pulse is 76.  His respiration is 22 and oxygen saturation is 93%. Physical Exam  Vitals and nursing note reviewed. Constitutional:       General: He is not in acute distress. Appearance: Normal appearance. He is well-developed. He is obese. He is not ill-appearing or diaphoretic. HENT:      Head: Normocephalic and atraumatic. Nose: Nose normal.      Mouth/Throat:      Mouth: Mucous membranes are moist.      Pharynx: Oropharynx is clear. Eyes:      General:         Right eye: No discharge. Left eye: No discharge. Extraocular Movements: Extraocular movements intact. Conjunctiva/sclera: Conjunctivae normal.      Pupils: Pupils are equal, round, and reactive to light. Neck:      Trachea: No tracheal deviation. Cardiovascular:      Rate and Rhythm: Normal rate and regular rhythm. Pulses: Normal pulses. Heart sounds: Normal heart sounds. No murmur heard. No gallop. Comments: Normal capillary refill  Pulmonary:      Effort: Pulmonary effort is normal. Tachypnea present. No respiratory distress. Breath sounds: Normal breath sounds. No stridor. No decreased breath sounds, wheezing, rhonchi or rales. Chest:      Chest wall: No mass or tenderness. Abdominal:      General: Bowel sounds are normal.      Palpations: Abdomen is soft. Musculoskeletal:         General: No tenderness or deformity. Normal range of motion. Cervical back: Normal range of motion. Right lower leg: No tenderness. No edema. Left lower leg: No tenderness. No edema. Skin:     General: Skin is warm and dry. Capillary Refill: Capillary refill takes less than 2 seconds. Coloration: Skin is not cyanotic or pale. Findings: No erythema or rash. Neurological:      General: No focal deficit present. Mental Status: He is alert and oriented to person, place, and time. Cranial Nerves: No cranial nerve deficit. Psychiatric:         Mood and Affect: Mood is anxious.          Behavior: Behavior normal.       DIFFERENTIAL DIAGNOSIS:   CAD, ACS, anxiety, pneumonia    DIAGNOSTIC RESULTS     EKG: All EKG's are interpreted by the Emergency Department Physician who eithersigns or Co-signs this chart in the absence of a cardiologist.       EKG Emergency (Preliminary result)   Component (Lab Inquiry)  Collection Time Result Time Ventricular Rate Atrial Rate P-R Interval QRS Duration Q-T Interval QTc Calculation (Bazett) P Axis R Axis T Axis   11/22/22 23:59:42 11/23/22 00:39:54 79 79 202 88 398 456 52 33 88   Previous Results   11/21/22 12:49:17 11/21/22 14:00:53 73 73 198 92 436 480 -15 15 52   11/21/22 05:57:25 11/21/22 10:01:55 100   90 364 469  6 124   07/05/22 16:20:33 07/05/22 21:37:37 70 70 192 86 408 440 17 -7 84   04/20/22 15:59:27 04/20/22 16:38:48 54 54 190 84 442 419 -11 6 80   04/20/22 14:08:10 04/20/22 15:01:54 104   92 352 462  15 142         Preliminary result                Narrative:    Normal sinus rhythm   Septal infarct , age undetermined   Abnormal ECG   When compared with ECG of 21-NOV-2022 12:49,   Fusion complexes are no longer Present   Septal infarct is now Present                RADIOLOGY: non-plainfilm images(s) such as CT, Ultrasound and MRI are read by the radiologist.  Plain radiographic images are visualized and preliminarily interpreted by the emergency physician unless otherwise stated below.   XR CHEST PORTABLE   Final Result   Impression:   Minimal right basilar atelectasis otherwise stable findings in the    comparison exam.      This document has been electronically signed by: Spenser Calixto MD on    11/23/2022 01:18 AM            LABS:   Labs Reviewed   CBC WITH AUTO DIFFERENTIAL - Abnormal; Notable for the following components:       Result Value    RBC 3.30 (*)     Hemoglobin 11.2 (*)     Hematocrit 34.5 (*)     .5 (*)     MCH 33.9 (*)     RDW-SD 52.7 (*)     Platelets 292 (*)     Segs Absolute 8.8 (*)     Lymphocytes Absolute 0.6 (*)     All other components within normal limits   BASIC METABOLIC PANEL - Abnormal; Notable for the following components:    Glucose 205 (*)     BUN 39 (*)     Creatinine 2.8 (*)     All other components within normal limits   HEPATIC FUNCTION PANEL - Abnormal; Notable for the following components:    Albumin 3.4 (*)     Total Protein 5.5 (*)     All other components within normal limits   TROPONIN - Abnormal; Notable for the following components:    Troponin T 4.620 (*)     All other components within normal limits   PROTIME-INR - Abnormal; Notable for the following components:    INR 1.85 (*)     All other components within normal limits   GLOMERULAR FILTRATION RATE, ESTIMATED - Abnormal; Notable for the following components:    Est, Glom Filt Rate 25 (*)     All other components within normal limits   TROPONIN - Abnormal; Notable for the following components:    Troponin T 5.310 (*)     All other components within normal limits   PROTIME-INR - Abnormal; Notable for the following components:    INR 1.69 (*)     All other components within normal limits   HEPATIC FUNCTION PANEL - Abnormal; Notable for the following components:    AST 46 (*)     Total Protein 6.0 (*)     All other components within normal limits   LIPASE - Abnormal; Notable for the following components:    Lipase 65.3 (*)     All other components within normal limits   LIPASE   ANION GAP   OSMOLALITY   TROPONIN   TROPONIN   TROPONIN   BRAIN NATRIURETIC PEPTIDE   BASIC METABOLIC PANEL   MAGNESIUM   GLOMERULAR FILTRATION RATE, ESTIMATED   CREATININE, RANDOM URINE   SODIUM, URINE, RANDOM   POCT GLUCOSE   POCT GLUCOSE   POCT GLUCOSE       EMERGENCY DEPARTMENT COURSE:   Vitals:    Vitals:    11/23/22 0311 11/23/22 0408 11/23/22 0423 11/23/22 0428   BP:  (!) 149/73 136/83 (!) 153/81   Pulse:  77 75 76   Resp: 18 22     Temp:  99.4 °F (37.4 °C)     TempSrc:  Oral     SpO2:  93%     Weight:  293 lb 4.8 oz (133 kg)     Height:  6' 2\" (1.88 m)                              ED Course as of 11/23/22 0703   Wed Nov 23, 2022   0204 Spoke to cardiology--Sheila Browne. Hold coumadin and no heparin until they see her in the morning to r/o tamponade. Pain control with NTG and morphine.   [KJ]      ED Course User Index  [KJ] Aure Alvarez, ISABELLA - CNP        Internal Administration   First Dose      Second Dose           Last COVID Lab SARS-CoV-2, NAAT (no units)   Date Value   02/07/2022 NOT  DETECTED            MDM    Patient was seen and evaluated in the emergency room for chest pain, shortness of breath, anxiety. Appropriate labs and imaging are ordered and reviewed. Patient is treated with aspirin and started on a nitro drip. Patient is given a dose of Ativan for his anxiety. EKG is reassuring however troponin came back at 5.31. This is significantly above the patient's baseline. I reviewed findings with the patient. I then discussed with the hospitalist.  I also consulted with cardiology. Patient will be admitted to the hospitalist service with a cardiology consult. Patient is agreeable. Admitted in stable condition. The results of pertinent diagnostic studies and exam findings were discussed. The patients provisional diagnosis and plan of care were discussed with the patient and present family. The patient and/or present family expressed understanding of the diagnosis and plan. The nurse was instructed to provide written instructions and appropriate follow-up information. The patient understands their need and responsibility to obtain additional follow-up as instructed. The patient is comfortable with the plan and discharge. The risks of medications administered and prescribed were discussed with the patient and family present. No notes of EC Admission Criteria type on file.       Medications   nitroGLYCERIN 50 mg in dextrose 5% 250 mL infusion (15 mcg/min IntraVENous Rate/Dose Change 11/23/22 0428)   sodium chloride flush 0.9 % injection 5-40 mL (has no administration in time range)   sodium chloride flush 0.9 % injection 5-40 mL (has no administration in time range)   0.9 % sodium chloride infusion (has no administration in time range)   ondansetron (ZOFRAN-ODT) disintegrating tablet 4 mg (has no administration in time range)     Or   ondansetron (ZOFRAN) injection 4 mg (has no administration in time range)   acetaminophen (TYLENOL) tablet 650 mg (has no administration in time range)     Or   acetaminophen (TYLENOL) suppository 650 mg (has no administration in time range)   polyethylene glycol (GLYCOLAX) packet 17 g (has no administration in time range)   ALPRAZolam (XANAX) tablet 0.5 mg (has no administration in time range)   amiodarone (CORDARONE) tablet 200 mg (has no administration in time range)   atorvastatin (LIPITOR) tablet 80 mg (has no administration in time range)   ticagrelor (BRILINTA) tablet 90 mg (has no administration in time range)   doxazosin (CARDURA) tablet 2 mg (has no administration in time range)   hydrALAZINE (APRESOLINE) tablet 100 mg (has no administration in time range)   hydrOXYzine HCl (ATARAX) tablet 25 mg (has no administration in time range)   metoprolol succinate (TOPROL XL) extended release tablet 100 mg (has no administration in time range)   mexiletine (MEXITIL) capsule 150 mg (has no administration in time range)   pantoprazole (PROTONIX) tablet 40 mg (40 mg Oral Given 11/23/22 0640)   potassium chloride (KLOR-CON M) extended release tablet 20 mEq (has no administration in time range)   bumetanide (BUMEX) tablet 2 mg ( Oral Automatically Held 11/26/22 0900)   insulin glargine (LANTUS) injection vial 30 Units (has no administration in time range)   insulin lispro (HUMALOG) injection vial 0-4 Units (has no administration in time range)   insulin lispro (HUMALOG) injection vial 0-4 Units (has no administration in time range)   glucose chewable tablet 16 g (has no administration in time range)   dextrose bolus 10% 125 mL (has no administration in time range)     Or   dextrose bolus 10% 250 mL (has no administration in time range)   glucagon (rDNA) injection 1 mg (has no administration in time range)   dextrose 10 % infusion (has no administration in time range)   magnesium sulfate 2000 mg in 50 mL IVPB premix (has no administration in time range)   LORazepam (ATIVAN) injection 0.5 mg (0.5 mg IntraVENous Given 11/23/22 0116)   aspirin chewable tablet 324 mg (324 mg Oral Given 11/23/22 0250)   morphine injection 4 mg (4 mg IntraVENous Given 11/23/22 0249)   ondansetron (ZOFRAN) injection 4 mg (4 mg IntraVENous Given 11/23/22 0245)       Please note that the patient was evaluated during a pandemic. All efforts were made for HIPPA compliance as well as provision of appropriate care. Patient was seen independently by myself. The patient's final impression and disposition and plan was determined by myself. Strict return precautions and follow up instructions were discussed with the patient prior to discharge, with which the patient agrees. Physical assessment findings, diagnostic testing(s) if applicable, and vital signs reviewed with patient/patient representative. Questions answered. Medications asdirected, including OTC medications for supportive care. Education provided on medications. Differential diagnosis(s) discussed with patient/patient representative. Home care/self care instructions reviewed withpatient/patient representative. Patient is to follow-up with family care provider in 2-3 days if no improvement. Patient is to go to the emergency department if symptoms worsen. Patient/patient representative isaware of care plan, questions answered, verbalizes understanding and is in agreement. CRITICAL CARE:   None    CONSULTS:  Hospitalist and OTHER    PROCEDURES:  None    FINAL IMPRESSION     1. Anxiety    2. History of prior ablation treatment    3. Chest pain, unspecified type    4.  Elevated troponin DISPOSITION/PLAN   DISPOSITION Admitted 11/23/2022 03:13:59 AM      PATIENT REFERREDTO:  No follow-up provider specified.     DISCHARGE MEDICATIONS:  Current Discharge Medication List          (Please note that portions of this note were completed with a voice recognition program.  Efforts were made to edit the dictations but occasionally words are mis-transcribed.)         ISABELLA Neves CNP, APRN - CNP  11/23/22 8441

## 2022-11-23 NOTE — PLAN OF CARE
Problem: Discharge Planning  Goal: Discharge to home or other facility with appropriate resources  11/23/2022 1345 by [place], RN  Outcome: Progressing  Flowsheets  Taken 11/23/2022 1344 by [place], RN  Discharge to home or other facility with appropriate resources: Identify barriers to discharge with patient and caregiver  Taken 11/23/2022 0814 by Allison Jon RN  Discharge to home or other facility with appropriate resources: Identify barriers to discharge with patient and caregiver  Note: Continue to assess discharge needs. Patient is from home with wife. Echo and Ekg done today. Problem: Pain  Goal: Verbalizes/displays adequate comfort level or baseline comfort level  11/23/2022 1345 by [place], RN  Outcome: Progressing  Flowsheets  Taken 11/23/2022 1345  Verbalizes/displays adequate comfort level or baseline comfort level:   Encourage patient to monitor pain and request assistance   Assess pain using appropriate pain scale  Taken 11/23/2022 0800  Verbalizes/displays adequate comfort level or baseline comfort level: Encourage patient to monitor pain and request assistance  Note: Analgesics given per demand. Problem: Safety - Adult  Goal: Free from fall injury  11/23/2022 1345 by [place], RN  Outcome: Progressing  Note: Patient is up independently. Call light kept within reach.      Problem: ABCDS Injury Assessment  Goal: Absence of physical injury  11/23/2022 1345 by [place], RN  Outcome: Progressing     Problem: Chronic Conditions and Co-morbidities  Goal: Patient's chronic conditions and co-morbidity symptoms are monitored and maintained or improved  Outcome: Progressing  Flowsheets (Taken 11/23/2022 0814 by Allison Jon RN)  Care Plan - Patient's Chronic Conditions and Co-Morbidity Symptoms are Monitored and Maintained or Improved: Monitor and assess patient's chronic conditions and comorbid symptoms for stability, deterioration, or improvement  Note: Patient is sinus rhythm in the monitor. Problem: Respiratory - Adult  Goal: Achieves optimal ventilation and oxygenation  Outcome: Progressing  Flowsheets  Taken 11/23/2022 1345 by Mariano Richards RN  Achieves optimal ventilation and oxygenation:   Assess for changes in respiratory status   Assess for changes in mentation and behavior  Taken 11/23/2022 0814 by Allison Jon RN  Achieves optimal ventilation and oxygenation: Assess for changes in respiratory status  Note: Patient is in room air. Continue to assess any episodes of SOB   Care plan reviewed to patient. Patient verbalized understanding and contribute to goal setting.

## 2022-11-23 NOTE — ED TRIAGE NOTES
Patient presents to ED with chief complaint of chest pain and shortness of breath. Patient had an ablation done yesterday, and now states he is having pain. Patient states he also has anxiety. Patient resting in bed. Respirations easy and unlabored. No distress noted. Call light within reach.

## 2022-11-23 NOTE — CONSULTS
[facility]    Patient's Name/Date of Birth: Evgeny Bautista / 1965 (59 y.o.)    Date: 2022     Referring Provider: Maralyn Councilman Paphanchith, *    CHIEF COMPLAINT: Chest Pain and s/p Recent afib ablation and multiple cardioversion for atr fib on 22    HPI: This is a pleasant 62 y.o. male presents with chest pain. PMH is significant for CAD s/p PCI/GENNARO of LAD in 10/2020, A. Fib s/p ablation 22, anomalous RCA, CHF, HTN, HLD, CKD stage IV. The patient states that he had an ablation Monday morning, during which he needed to be shocked 10 times per Dr. Breana Chow. Afterwards, he felt a bruised sensation over his entire chest, which he attributes to the shocks. He states that his pain is intermittent, minimally tender to palpation, and slowly improving. The pain is improved with nitroglycerin, worsened with anxiety, and 7/10 in severity. This pain is different from his previous heart attack that required a stent in . He denies any SOB, palpitations, headache, dizziness, leg swelling, or any other symptoms. He admits to smoking 0.5 PPD for 40 years. He denies drinking or illicit drug use.     Cardiac Medications: Amiodarone 200 mg daily, Atorvastatin 80 mg daily, Hydralazine 100 mg TID, Toprol  mg daily, Brilinta 90 mg BID, IV nitro drip, Bumex 2 mg daily (held), Warfarin (held)    Labs: Troponin 5.310 -> 4.620 (significantly elevated from baseline), Pro-BNP 31221 (significantly elevated from baseline), Cr 2.7 (baseline)    EK22 - NSR, faint T-wave inversions in V2    ECHO: 22 - EF 25-30%, severely reduced LV EF    Cath: 10/19/20 - PCI/GENNARO of LAD    All labs, EKG's, diagnostic testing and images as well as cardiac cath, stress testing were reviewed during this encounter    Past Medical History:   Diagnosis Date    Acute systolic CHF (congestive heart failure) ([facility] [address] 2015    Alcohol abuse     stopped drinking since     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    Anxiety disorder     Arthritis     Atrial fibrillation (HCC)     CAD (coronary artery disease) 3/25/2014    s/p CABG in may 2014    Cardiomyopathy [facility])     Chronic kidney disease     Depression     Diabetes mellitus ([facility] [address]     Drug abuse ([facility] [address]     hx of cacaine abuse, stopped abusing drugs in 2012    GERD (gastroesophageal reflux disease)     H/O cardiac catheterization 4/30/2014    Hyperlipidemia     Hypertension     Intradural extramedullary spinal tumor     Lumbar spine tumor     [employer] dual icd      Neuromuscular disorder ([address]     Other disorders of kidney and ureter in diseases classified elsewhere     Paroxysmal atrial fibrillation ([facility] [address] 7/22/2014    V tach     V-tach     s/p ablation in dec 2014     Past Surgical History:   Procedure Laterality Date    CARDIAC CATHETERIZATION  3/20/14     [facility]    CARDIAC DEFIBRILLATOR PLACEMENT  10/13/2015    [employer] [place], MRI CONDITIONAL ICD    CORONARY ARTERY BYPASS GRAFT  5-9-14    3 bypass    EKG 12-LEAD  7/17/2015         OTHER SURGICAL HISTORY Left 10/21/14    Left Bursectomy, I & D Left Elbow - Dr. Edson Britton LAMINECTOMY,>2 [facility] [place] N/A 1/16/2018    LUMBAR AND SACRAL LAMINECTOMY, REMOVAL OF INTRASPINAL TUMORS performed by Jorge Wynn MD at [address] [place] [address] harvest from legs     Current Facility-Administered Medications   Medication Dose Route Frequency Provider Last Rate Last Admin    nitroGLYCERIN 50 mg in dextrose 5% 250 mL infusion  5-200 mcg/min IntraVENous Continuous ISABELLA Sesay - CNP 6 mL/hr at 11/23/22 0832 20 mcg/min at 11/23/22 0832    sodium chloride flush 0.9 % injection 5-40 mL  5-40 mL IntraVENous 2 times per day Abimael Andry, DO   10 mL at 11/23/22 0915    sodium chloride flush 0.9 % injection 5-40 mL  5-40 mL IntraVENous PRN Abimael Andry, DO        0.9 % sodium chloride infusion   IntraVENous PRN Abimael Andry, DO bolus 10% 125 mL  125 mL IntraVENous PRN Abimaelmadhuri Acostahal, DO        Or    dextrose bolus 10% 250 mL  250 mL IntraVENous PRN Abimael Andry, DO        glucagon (rDNA) injection 1 mg  1 mg SubCUTAneous PRN Abimael Andry, DO        dextrose 10 % infusion   IntraVENous Continuous PRN Abimael Andry, DO        magnesium sulfate 2000 mg in 50 mL IVPB premix  2,000 mg IntraVENous PRN Abimael Andry, DO        nicotine (NICODERM CQ) 21 MG/24HR 1 patch  1 patch TransDERmal Daily ISABELLA Ash CNP         Prior to Admission medications    Medication Sig Start Date End Date Taking? Authorizing Provider   atorvastatin (LIPITOR) 80 MG tablet TAKE 1 TABLET BY MOUTH EVERY NIGHT 11/21/22   ISABELLA Rogers CNP   pantoprazole (PROTONIX) 40 MG tablet Take 1 tablet by mouth every morning (before breakfast) 11/21/22   Sherine Schrader MD   doxazosin (CARDURA) 2 MG tablet TAKE 1 TABLET BY MOUTH DAILY 11/15/22   ISABELLA Rogers CNP   Lancets ([address]) [address] [facility] USE AS DIRECTED TWICE DAILY 11/15/22   ISABELLA Rogers CNP   BRILINTA 90 MG TABS tablet TAKE 1 TABLET BY MOUTH TWICE DAILY 11/15/22   Jagjit Gracia MD   hydrOXYzine HCl (ATARAX) 25 MG tablet Take 1 tablet by mouth nightly as needed for Anxiety 11/15/22 11/25/22  ISABELLA Rogers CNP   amiodarone (CORDARONE) 200 MG tablet Take 1 tablet by mouth daily 11/14/22   Jagjit Gracia MD   ALPRAZolam Beverly Lauber) 0.5 MG tablet TAKE 1 TABLET BY MOUTH AT BEDTIME FOR ANXIETY 10/24/22 4/22/23  ISABELLA Rogers CNP   linagliptin (TRADJENTA) 5 MG tablet TAKE 1 TABLET BY MOUTH DAILY 10/3/22   ISABELLA Rogers CNP   potassium chloride (KLOR-CON M) 20 MEQ extended release tablet TAKE 1 TABLET BY MOUTH DAILY 10/3/22   ISABELLA Rogers CNP   bumetanide (BUMEX) 2 MG tablet Take 1 tablet by mouth in the morning.  8/10/22   Milagros Jimenez MD   doxazosin (CARDURA) 4 MG tablet Take 1 tablet by mouth in the morning. Patient not taking: No sig reported 7/20/22   Tobi Lara MD   ranolazine (RANEXA) 500 MG extended release tablet Take 1 tablet by mouth in the morning and 1 tablet before bedtime.   Patient not taking: No sig reported 7/18/22   Kendrick Newton MD   insulin glargine (LANTUS SOLOSTAR) 100 UNIT/ML injection pen Inject 45 Units into the skin nightly 7/12/22   ISABELLA Valadez CNP   hydrALAZINE (APRESOLINE) 100 MG tablet TAKE 1 TABLET BY MOUTH THREE TIMES DAILY 6/14/22   Fany Thornton MD   blood glucose test strips (ONETOUCH ULTRA) strip USE AS DIRECTED TWICE DAILY 5/19/22   ISABELLA Valadez CNP   warfarin (COUMADIN) 5 MG tablet USE AS DIRECTED BY COUMADIN CLINIC 4/12/22   ISABELLA Valadez CNP   isosorbide mononitrate (IMDUR) 120 MG extended release tablet Take 1 tablet by mouth daily 4/6/22   ISABELLA Valadez CNP   metoprolol succinate (TOPROL XL) 100 MG extended release tablet Take 1 tablet by mouth daily 3/14/22   Kendrick Newton MD   mexiletine (MEXITIL) 150 MG capsule TAKE 2 CAPSULES THREE TIMES DAILY FOR ATRIAL FIBRILLATION 2/21/22   Fany Thornton MD   Blood Glucose Monitoring Suppl (ONE TOUCH ULTRA 2) w/Device KIT 1 kit by Does not apply route 2 times daily 11/22/21   ISABELLA Valadez CNP   Insulin Pen Needle 31G X 8 MM MISC 1 each by Does not apply route daily 11/22/21   ISABELLA Valadez CNP   losartan (COZAAR) 50 MG tablet TAKE 1 TABLET BY MOUTH DAILY  Patient not taking: No sig reported 6/21/21   Mary Jomitesh Gloria DO   glucose blood VI test strips (PHIL CONTOUR TEST) strip 1 each by In Vitro route daily 1/5/17   ISABELLA Valadez CNP   acetaminophen 650 MG TABS Take 650 mg by mouth every 4 hours as needed 1/22/16   Jose Dunn MD   Scheduled Meds:   sodium chloride flush  5-40 mL IntraVENous 2 times per day    amiodarone  200 mg Oral Daily    atorvastatin  80 mg Oral Nightly    ticagrelor  90 mg Oral BID    doxazosin  2 mg Oral Daily hydrALAZINE  100 mg Oral TID    metoprolol succinate  100 mg Oral Daily    mexiletine  150 mg Oral TID    pantoprazole  40 mg Oral QAM AC    potassium chloride  20 mEq Oral Daily    [Held by provider] bumetanide  2 mg Oral Daily    insulin glargine  30 Units SubCUTAneous Nightly    insulin lispro  0-4 Units SubCUTAneous TID WC    insulin lispro  0-4 Units SubCUTAneous Nightly    nicotine  1 patch TransDERmal Daily     Continuous Infusions:   nitroGLYCERIN 20 mcg/min (11/23/22 0832)    sodium chloride      dextrose       PRN Meds:.sodium chloride flush, sodium chloride, ondansetron **OR** ondansetron, acetaminophen **OR** acetaminophen, polyethylene glycol, ALPRAZolam, hydrOXYzine HCl, glucose, dextrose bolus **OR** dextrose bolus, glucagon (rDNA), dextrose, magnesium sulfate    Allergies   Allergen Reactions    Latex     Pcn [Penicillins] Hives    Zoloft [Sertraline Hcl] Anxiety     Worsened anxiety     Family History   Problem Relation Age of Onset    Diabetes Mother     High Blood Pressure Mother     Stroke Mother     Diabetes Father     Heart Disease Father     High Blood Pressure Father     Heart Disease Sister         CABG    Diabetes Maternal Grandmother     Diabetes Maternal Grandfather     Heart Disease Paternal Grandfather      Social History     Socioeconomic History    Marital status:      Spouse name: Not on file    Number of children: 3    Years of education: 12    Highest education level: High school graduate   Occupational History     Employer: [employer]   Tobacco Use    Smoking status: Every Day     Packs/day: 0.50     Years: 40.00     Pack years: 20.00     Types: Cigarettes     Start date: 7/16/1980    Smokeless tobacco: Current     Types: Snuff   Vaping Use    Vaping Use: Never used   Substance and Sexual Activity    Alcohol use: Not Currently    Drug use: Not Currently    Sexual activity: Not Currently     Partners: Female   Other Topics Concern    Not on file   Social History Narrative    Not on file     Social Determinants of Health     Financial Resource Strain: Low Risk     Difficulty of Paying Living Expenses: Not hard at all   Food Insecurity: No Food Insecurity    Worried About Running Out of Food in the Last Year: Never true    Ran Out of Food in the Last Year: Never true   Transportation Needs: Not on file   Physical Activity: Not on file   Stress: Not on file   Social Connections: Not on file   Intimate Partner Violence: Not on file   Housing Stability: Not on file     ROS:   Constitutional: Denies any recent wt change. Eyes:  Denies any blurring or double vision, no glaucoma  Ears/Nose/Mouth/Throat:  Denies any chronic sinus/rhinitis, bleeding gums  Cardiovascular:  As described above. Respiratory:  Denies any frequent cough, wheezing or coughing up blood  Genitourinary:  Denies difficulty with urination and kidney stones  Gastrointestinal:  Denies any chronic problems with abdominal pain, nausea, vomiting or diarrhea  Musculoskeletal:  Denies any joint pain, back pain, or difficulty walking  Integumentary:  Denies any rash  Neurological:  No numbness or tingling  Endocrine:  Denies any polydipsia. Hematologic/Lymphatic:  Denies any hemorrhage or lymphatic drainage problems. Labs:  CBC:   Recent Labs     11/21/22  0536 11/23/22  0050   WBC 7.1 10.4   HGB 12.1* 11.2*   HCT 36.6* 34.5*   .7* 104.5*    106*     BMP:   Recent Labs     11/21/22  0537 11/23/22  0050 11/23/22  0606    138 140   K 3.6 3.9 4.4    104 106   CO2 21* 24 21*   BUN 33* 39* 38*   CREATININE 2.6* 2.8* 2.7*   MG  --   --  2.1     Accucheck Glucoses:   Recent Labs     11/23/22  0834   POCGLU 94     Cardiac Enzymes: No results for input(s): CKTOTAL, CKMB, CKMBINDEX, TROPONINI in the last 72 hours.   PT/INR:   Recent Labs     11/21/22  0536 11/23/22  0050 11/23/22  0446   INR 2.19* 1.69* 1.85*     APTT:   Recent Labs     11/21/22 0536   APTT 39.4*     Liver Profile:  Lab Results Component Value Date/Time    AST 40 11/23/2022 04:46 AM    ALT 23 11/23/2022 04:46 AM    BILIDIR <0.2 11/23/2022 04:46 AM    BILITOT 0.5 11/23/2022 04:46 AM    ALKPHOS 84 11/23/2022 04:46 AM     Lab Results   Component Value Date/Time    CHOL 115 11/15/2022 03:40 PM    HDL 40 11/15/2022 03:40 PM    TRIG 121 11/15/2022 03:40 PM     TSH:   Lab Results   Component Value Date/Time    TSH 2.650 07/05/2022 04:30 PM     UA:   Lab Results   Component Value Date/Time    NITRITE neg 01/15/2016 10:02 AM    COLORU YELLOW 04/22/2022 03:45 PM    PHUR 6.5 04/22/2022 03:45 PM    LABCAST NONE SEEN 04/22/2022 03:45 PM    LABCAST NONE SEEN 04/22/2022 03:45 PM    WBCUA 0-2 04/22/2022 03:45 PM    RBCUA 5-10 04/22/2022 03:45 PM    MUCUS NONE SEEN 02/14/2018 09:50 AM    YEAST NONE SEEN 04/22/2022 03:45 PM    BACTERIA NONE SEEN 04/22/2022 03:45 PM    SPECGRAV 1.016 04/22/2022 03:45 PM    LEUKOCYTESUR NEGATIVE 04/22/2022 03:45 PM    LEUKOCYTESUR NEGATIVE 09/24/2019 01:00 PM    UROBILINOGEN 0.2 04/22/2022 03:45 PM    BILIRUBINUR NEGATIVE 04/22/2022 03:45 PM    BLOODU NEGATIVE 04/22/2022 03:45 PM    GLUCOSEU NEGATIVE 09/24/2019 01:00 PM    AMORPHOUS NONE SEEN 02/14/2018 09:50 AM         Physical Exam:  Vitals:    11/23/22 0938   BP:    Pulse: 70   Resp: 16   Temp:    SpO2: 93%    No intake or output data in the 24 hours ending 11/23/22 1033   General:  No acute distress  Neck: Supple, no JVD, no carotid bruits  Heart: Regular rhythm normal S1 and S2, no rubs, murmurs or gallops  Lungs: clear to ascultation no rales, wheezes, or rhonchi  Abdomen: positive bowel sounds, soft, non-tender, non-distended, no bruits, no masses  Extremities:no clubbing, cyanosis or edema  Neurologic: alert and oriented x 3, cranial nerves 2-12 grossly intact, motor and sensory intact, moving all extremities  Skin: No rashes  Psych: AO x 3, no depression/sindy, no pressured speech, normal affect  Lymph: No obvious LAD      CXR no acute abn  EKG 11/22/22/  NSR, No  Troponin elevated and trending down-    Assessment:  Chest Pain like more of soreness, Unspecified Etiology. Likely 2/2 recent ablation procedure,   The chest soreness noted after ablation and persisted and now better after admission with some morphine as needed  Elevation in BNP,   Elevated Troponin, downtrending. Secondary to above  CAD s/p GENNARO of LAD 2019. Noted to have anomalous RCA in history. Atrial fibrillation s/p Ablation in 11/21/22, currently NSR  CMP  HFrEF with EF 25-30% s/p ICD placement, currently compensated. HLD    Plan:  ECHO Limited ordered  Repeat troponin and EKG tomorrow  We will consider further ischemic workup depending on the above results  Continue current medical therapy. Continue to monitor on telemetry  We will continue to follow. Thank you for allowing us to participate in the care of this patient. Please do not hesitate to call us with questions. Electronically signed by Honey Umaña DO on 11/23/2022 at 10:33 AM      I have seen and examined the patient independently. . Face to face evaluation and examination performed. The above evaluation and note has been reviewed and Changes made as necessary. Labs, EKG, echo, and radiographs reviewed. I Have discussed with Honey Umaña DO about this patient in detail   D/w Patient's R.N. and specific instructions given and cardiovascular disease issues addressed. I above assessment and plan has been reviewed and modified per my assessment as necessary and I agree with it. Plan:     ECHO Limited ordered  Trend  troponin and EKG tomorrow  Further work up need based on the above test and course overnight  Control the chest wall soreness/pain  Continue current medical therapy. Continue to monitor on telemetry  D/w the pat the plan of care  D/w the nurse  Cont percocet and morphine prn  Off NTG drip    Thank you for allowing me to participate in the care of your patient.  Please don't hesitate to contact me regarding any further

## 2022-11-23 NOTE — PLAN OF CARE
Continue treatments as ordered to help facilitate normal breath sounds. Acapella technique done well. Change order to PRN but pt aware to work on it every few hours 5-10 breaths. Pt agreeable to plan.

## 2022-11-24 VITALS
OXYGEN SATURATION: 98 % | DIASTOLIC BLOOD PRESSURE: 71 MMHG | SYSTOLIC BLOOD PRESSURE: 149 MMHG | RESPIRATION RATE: 18 BRPM | TEMPERATURE: 98.4 F | HEIGHT: 74 IN | BODY MASS INDEX: 37.6 KG/M2 | WEIGHT: 293 LBS | HEART RATE: 63 BPM

## 2022-11-24 PROBLEM — I48.19 ATRIAL FIBRILLATION, PERSISTENT (HCC): Status: ACTIVE | Noted: 2022-11-24

## 2022-11-24 LAB
ANION GAP SERPL CALCULATED.3IONS-SCNC: 10 MEQ/L (ref 8–16)
BUN BLDV-MCNC: 43 MG/DL (ref 7–22)
CALCIUM SERPL-MCNC: 7.9 MG/DL (ref 8.5–10.5)
CHLORIDE BLD-SCNC: 104 MEQ/L (ref 98–111)
CO2: 23 MEQ/L (ref 23–33)
CREAT SERPL-MCNC: 2.6 MG/DL (ref 0.4–1.2)
EKG ATRIAL RATE: 62 BPM
EKG ATRIAL RATE: 73 BPM
EKG P AXIS: -11 DEGREES
EKG P AXIS: 39 DEGREES
EKG P-R INTERVAL: 198 MS
EKG P-R INTERVAL: 200 MS
EKG Q-T INTERVAL: 426 MS
EKG Q-T INTERVAL: 458 MS
EKG QRS DURATION: 96 MS
EKG QRS DURATION: 96 MS
EKG QTC CALCULATION (BAZETT): 464 MS
EKG QTC CALCULATION (BAZETT): 469 MS
EKG R AXIS: 27 DEGREES
EKG R AXIS: 4 DEGREES
EKG T AXIS: 102 DEGREES
EKG T AXIS: 84 DEGREES
EKG VENTRICULAR RATE: 62 BPM
EKG VENTRICULAR RATE: 73 BPM
ERYTHROCYTE [DISTWIDTH] IN BLOOD BY AUTOMATED COUNT: 13.9 % (ref 11.5–14.5)
ERYTHROCYTE [DISTWIDTH] IN BLOOD BY AUTOMATED COUNT: 53.4 FL (ref 35–45)
GFR SERPL CREATININE-BSD FRML MDRD: 28 ML/MIN/1.73M2
GLUCOSE BLD-MCNC: 166 MG/DL (ref 70–108)
GLUCOSE BLD-MCNC: 180 MG/DL (ref 70–108)
HCT VFR BLD CALC: 34.1 % (ref 42–52)
HEMOGLOBIN: 10.8 GM/DL (ref 14–18)
MAGNESIUM: 2.1 MG/DL (ref 1.6–2.4)
MCH RBC QN AUTO: 33.5 PG (ref 26–33)
MCHC RBC AUTO-ENTMCNC: 31.7 GM/DL (ref 32.2–35.5)
MCV RBC AUTO: 105.9 FL (ref 80–94)
PLATELET # BLD: 107 THOU/MM3 (ref 130–400)
PMV BLD AUTO: 10.3 FL (ref 9.4–12.4)
POTASSIUM SERPL-SCNC: 4.6 MEQ/L (ref 3.5–5.2)
RBC # BLD: 3.22 MILL/MM3 (ref 4.7–6.1)
SODIUM BLD-SCNC: 137 MEQ/L (ref 135–145)
TROPONIN T: 2.44 NG/ML
WBC # BLD: 7.8 THOU/MM3 (ref 4.8–10.8)

## 2022-11-24 PROCEDURE — 6360000002 HC RX W HCPCS: Performed by: INTERNAL MEDICINE

## 2022-11-24 PROCEDURE — 93005 ELECTROCARDIOGRAM TRACING: CPT

## 2022-11-24 PROCEDURE — 85027 COMPLETE CBC AUTOMATED: CPT

## 2022-11-24 PROCEDURE — 82948 REAGENT STRIP/BLOOD GLUCOSE: CPT

## 2022-11-24 PROCEDURE — 80048 BASIC METABOLIC PNL TOTAL CA: CPT

## 2022-11-24 PROCEDURE — 84484 ASSAY OF TROPONIN QUANT: CPT

## 2022-11-24 PROCEDURE — 83735 ASSAY OF MAGNESIUM: CPT

## 2022-11-24 PROCEDURE — 93010 ELECTROCARDIOGRAM REPORT: CPT | Performed by: INTERNAL MEDICINE

## 2022-11-24 PROCEDURE — 6370000000 HC RX 637 (ALT 250 FOR IP)

## 2022-11-24 PROCEDURE — 99239 HOSP IP/OBS DSCHRG MGMT >30: CPT | Performed by: NURSE PRACTITIONER

## 2022-11-24 PROCEDURE — 36415 COLL VENOUS BLD VENIPUNCTURE: CPT

## 2022-11-24 PROCEDURE — 99232 SBSQ HOSP IP/OBS MODERATE 35: CPT | Performed by: PHYSICIAN ASSISTANT

## 2022-11-24 PROCEDURE — 6370000000 HC RX 637 (ALT 250 FOR IP): Performed by: NURSE PRACTITIONER

## 2022-11-24 PROCEDURE — 2580000003 HC RX 258

## 2022-11-24 RX ORDER — HYDROXYZINE HYDROCHLORIDE 25 MG/1
25 TABLET, FILM COATED ORAL 3 TIMES DAILY PRN
Status: DISCONTINUED | OUTPATIENT
Start: 2022-11-24 | End: 2022-11-24 | Stop reason: HOSPADM

## 2022-11-24 RX ADMIN — PANTOPRAZOLE SODIUM 40 MG: 40 TABLET, DELAYED RELEASE ORAL at 05:34

## 2022-11-24 RX ADMIN — METOPROLOL SUCCINATE 100 MG: 100 TABLET, FILM COATED, EXTENDED RELEASE ORAL at 09:02

## 2022-11-24 RX ADMIN — TICAGRELOR 90 MG: 90 TABLET ORAL at 09:03

## 2022-11-24 RX ADMIN — MORPHINE SULFATE 2 MG: 2 INJECTION, SOLUTION INTRAMUSCULAR; INTRAVENOUS at 05:35

## 2022-11-24 RX ADMIN — DOXAZOSIN 2 MG: 2 TABLET ORAL at 09:02

## 2022-11-24 RX ADMIN — AMIODARONE HYDROCHLORIDE 200 MG: 200 TABLET ORAL at 09:02

## 2022-11-24 RX ADMIN — HYDRALAZINE HYDROCHLORIDE 100 MG: 50 TABLET, FILM COATED ORAL at 09:02

## 2022-11-24 RX ADMIN — MEXILETINE HYDROCHLORIDE 150 MG: 150 CAPSULE ORAL at 09:02

## 2022-11-24 RX ADMIN — MORPHINE SULFATE 2 MG: 2 INJECTION, SOLUTION INTRAMUSCULAR; INTRAVENOUS at 01:04

## 2022-11-24 RX ADMIN — SODIUM CHLORIDE, PRESERVATIVE FREE 10 ML: 5 INJECTION INTRAVENOUS at 10:01

## 2022-11-24 RX ADMIN — POTASSIUM CHLORIDE 20 MEQ: 1500 TABLET, EXTENDED RELEASE ORAL at 09:03

## 2022-11-24 ASSESSMENT — PAIN DESCRIPTION - ORIENTATION
ORIENTATION: MID
ORIENTATION: LEFT;MID

## 2022-11-24 ASSESSMENT — PAIN SCALES - GENERAL
PAINLEVEL_OUTOF10: 7

## 2022-11-24 ASSESSMENT — PAIN DESCRIPTION - LOCATION
LOCATION: CHEST
LOCATION: CHEST

## 2022-11-24 ASSESSMENT — PAIN DESCRIPTION - DESCRIPTORS
DESCRIPTORS: BURNING
DESCRIPTORS: ACHING

## 2022-11-24 ASSESSMENT — PAIN DESCRIPTION - PAIN TYPE: TYPE: ACUTE PAIN

## 2022-11-24 ASSESSMENT — PAIN - FUNCTIONAL ASSESSMENT: PAIN_FUNCTIONAL_ASSESSMENT: ACTIVITIES ARE NOT PREVENTED

## 2022-11-24 NOTE — PROGRESS NOTES
Discharge teaching and instructions for diagnosis/procedure of chest pain completed with patient using teachback method. AVS reviewed. No new prescriptions. Patient voiced understanding regarding medications, follow up appointments, and care of self at home. Discharged in a wheelchair to home.

## 2022-11-24 NOTE — PROGRESS NOTES
Cardiology Progress Note      Patient:  Miles Israel  YOB: 1965  MRN: [id number]   Acct: [de-identified]  Admit Date:  11/22/2022  Primary Cardiologist: Carmen Kaur MD    Note per dr Elisa Lopez \"CHIEF COMPLAINT: Chest Pain and s/p Recent afib ablation and multiple cardioversion for atr fib on 11/21/22     HPI: This is a pleasant 62 y.o. male presents with chest pain. PMH is significant for CAD s/p PCI/GENNARO of LAD in 10/2020, A. Fib s/p ablation 11/21/22, anomalous RCA, CHF, HTN, HLD, CKD stage IV. The patient states that he had an ablation Monday morning, during which he needed to be shocked 10 times per Dr. Bibiana Montana. Afterwards, he felt a bruised sensation over his entire chest, which he attributes to the shocks. He states that his pain is intermittent, minimally tender to palpation, and slowly improving. The pain is improved with nitroglycerin, worsened with anxiety, and 7/10 in severity. This pain is different from his previous heart attack that required a stent in 2020. He denies any SOB, palpitations, headache, dizziness, leg swelling, or any other symptoms. He admits to smoking 0.5 PPD for 40 years. He denies drinking or illicit drug use\"    Subjective (Events in last 24 hours): pt awake and alert. NAD. Chest pain has resolved with time and pain medication per pt. No sob. No edema.   Ambulating without issues  He has anxiety that his PCP has been following  On RA      Objective:   BP (!) 170/84   Pulse 60   Temp 98.4 °F (36.9 °C) (Oral)   Resp 18   Ht 6' 2\" (1.88 m)   Wt 293 lb (132.9 kg)   SpO2 98%   BMI 37.62 kg/m²        TELEMETRY: nsr    Physical Exam:  General Appearance: alert and oriented to person, place and time, in no acute distress  Cardiovascular: normal rate, regular rhythm, normal S1 and S2, no murmurs, rubs, clicks, or gallops, distal pulses intact, no carotid bruits, no JVD  Pulmonary/Chest: clear to auscultation bilaterally- no wheezes, rales or rhonchi, normal air movement, no respiratory distress  Abdomen: soft, non-tender, non-distended, normal bowel sounds, no masses Extremities: no cyanosis, clubbing or edema, pulse   Skin: warm and dry  Head: normocephalic and atraumatic  Eyes: pupils equal, round, and reactive to light  Neck: supple and non-tender without mass, no thyromegaly   Neurological: alert, oriented, normal speech, no focal findings or movement disorder noted    Medications:    sodium chloride flush  5-40 mL IntraVENous 2 times per day    amiodarone  200 mg Oral Daily    atorvastatin  80 mg Oral Nightly    ticagrelor  90 mg Oral BID    doxazosin  2 mg Oral Daily    hydrALAZINE  100 mg Oral TID    metoprolol succinate  100 mg Oral Daily    mexiletine  150 mg Oral TID    pantoprazole  40 mg Oral QAM AC    potassium chloride  20 mEq Oral Daily    [Held by provider] bumetanide  2 mg Oral Daily    insulin glargine  30 Units SubCUTAneous Nightly    insulin lispro  0-4 Units SubCUTAneous TID WC    insulin lispro  0-4 Units SubCUTAneous Nightly    nicotine  1 patch TransDERmal Daily      nitroGLYCERIN Stopped (11/23/22 1205)    sodium chloride      dextrose       sodium chloride flush, 5-40 mL, PRN  sodium chloride, , PRN  ondansetron, 4 mg, Q8H PRN   Or  ondansetron, 4 mg, Q6H PRN  acetaminophen, 650 mg, Q6H PRN   Or  acetaminophen, 650 mg, Q6H PRN  polyethylene glycol, 17 g, Daily PRN  ALPRAZolam, 0.5 mg, Nightly PRN  hydrOXYzine HCl, 25 mg, Nightly PRN  glucose, 4 tablet, PRN  dextrose bolus, 125 mL, PRN   Or  dextrose bolus, 250 mL, PRN  glucagon (rDNA), 1 mg, PRN  dextrose, , Continuous PRN  magnesium sulfate, 2,000 mg, PRN  morphine, 2 mg, Q4H PRN  oxyCODONE-acetaminophen, 2 tablet, Q4H PRN        Diagnostics:  TTE 11/23/22  Summary   Technically difficult examination. Left Ventricular size is Mildly increased . Normal left ventricular wall thickness. There was severe global hypokinesis of the left ventricle. Systolic function was severely reduced. Ejection fraction is visually estimated at 30%. The left atrium is Mild to moderate dilated. Signature      ----------------------------------------------------------------   Electronically signed by Zac Lugo MD (Interpreting   physician) on 11/23/2022 at 09:09 PM      Lab Data:    Cardiac Enzymes:  No results for input(s): CKTOTAL, CKMB, CKMBINDEX, TROPONINI in the last 72 hours.     CBC:   Lab Results   Component Value Date/Time    WBC 7.8 11/24/2022 03:44 AM    RBC 3.22 11/24/2022 03:44 AM    RBC 4.75 11/03/2011 04:59 PM    HGB 10.8 11/24/2022 03:44 AM    HCT 34.1 11/24/2022 03:44 AM     11/24/2022 03:44 AM       CMP:    Lab Results   Component Value Date/Time     11/24/2022 03:44 AM    K 4.6 11/24/2022 03:44 AM    K 3.7 07/08/2022 03:51 AM     11/24/2022 03:44 AM    CO2 23 11/24/2022 03:44 AM    BUN 43 11/24/2022 03:44 AM    CREATININE 2.6 11/24/2022 03:44 AM    LABGLOM 28 11/23/2022 06:08 PM    GLUCOSE 180 11/24/2022 03:44 AM    CALCIUM 7.9 11/24/2022 03:44 AM       Hepatic Function Panel:    Lab Results   Component Value Date/Time    ALKPHOS 84 11/23/2022 04:46 AM    ALT 23 11/23/2022 04:46 AM    AST 40 11/23/2022 04:46 AM    PROT 5.5 11/23/2022 04:46 AM    BILITOT 0.5 11/23/2022 04:46 AM    BILIDIR <0.2 11/23/2022 04:46 AM    LABALBU 3.4 11/23/2022 04:46 AM       Magnesium:    Lab Results   Component Value Date/Time    MG 2.1 11/24/2022 03:44 AM       PT/INR:    Lab Results   Component Value Date/Time    PROTIME 27.3 11/17/2022 03:33 PM    INR 1.85 11/23/2022 04:46 AM       HgBA1c:    Lab Results   Component Value Date/Time    LABA1C 7.1 11/15/2022 03:40 PM       FLP:    Lab Results   Component Value Date/Time    TRIG 121 11/15/2022 03:40 PM    HDL 40 11/15/2022 03:40 PM    LDLCALC 51 11/15/2022 03:40 PM       TSH:    Lab Results   Component Value Date/Time    TSH 2.650 07/05/2022 04:30 PM         Assessment:    Atypical chest pain - likely related to recent ablation/shock  Elevated troponins - trending down - secondary to recent ablation/shock, no clinical evidence for ACS  Afib - s/p ablation 11/21/22 - currently NSR  HFrEF - ef 30 per TTE 11/23/22, hx ef 25 per MYRIAM 4/2022  Hx AICD  CAD - hx PCI LAD, anomalous RCA hx  HLP  HTN  CKD    Discussed with dr Krunal Brown  Plan:    Cont brilinta/statin/BB/amio/hydralazine  Resume home imdur/mexitil  No need for further ischemic w/up  Will follow prn  Keep prior appt with pacer clinic  F/up dr Stephanie Hinds 2 weeks       Electronically signed by Golden Richard PA-C on 11/24/2022 at 9:02 AM

## 2022-11-24 NOTE — DISCHARGE INSTR - MEDS
Recommendations for discharge:   Date Warfarin Dose   11/24/2022 5 mg   11/25/2022 5 mg   11/26/2022 5 mg   11/27/2022 5 mg   11/28/2022 2.5 mg   11/29/2022 INR     Provider dosing warfarin: [facility]     Recheck INR:  Tuesday, 11/29/2022 at [address] [place] [address].  The clinic is currently closed- call [address] [place] [address] to request appointment for Tuesday, 11/29/22

## 2022-11-24 NOTE — DISCHARGE SUMMARY
Hospital Medicine Discharge Summary      Patient Identification:   Mimi Price   : 1965  MRN: [id number]   Account: [de-identified]      Patient's PCP: ISABELLA Garza CNP    Admit Date: 2022     Discharge Date:  2022    Admitting Physician: Anushka Dias MD     Discharge Physician: ISABELLA Guy CNP     Discharge Diagnoses:    Atypical chest pain most probable due to recent ablation and shock. Elevated troponin. Hx AFIB s/p recent ablation with shock, current in NSR. CKD stage IIIB  CAD s/p 3 vessel CABG in . Ischemic cardiomyopathy, systolic heart failure. Type 2 diabetes. Hyperlipidemia. Uncontrolled primary HTN. Depression and anxiety. GERD. The patient was seen and examined on day of discharge and this discharge summary is in conjunction with any daily progress note from day of discharge. Hospital Course:     Mimi Price is a 62 y.o. male admitted to [facility] on 2022 for chest pain. History of AFIB. Underwent ablation by Dr. Arcelia Collazo . Noted to have to be shocked 10 times during procedure. Started to have a bruising sensation following procedure. Pain was initially intermittent. Became more frequent and intense. Came to the ED for evaluation. Found to have a significant rise in his troponin which would be expected. He was given Nitroglycerin and Ativan with improvement in symptoms. He was admitted. Cardiology was consulted. Low concern for ACS. Pain ws atypical and consistent with recent procedure. He was kept overnight for observation. Weaned off the nitroglycerin. Troponin trended down. EKGs were stable. Cardiology has cleared him for discharge today. Continue Coumadin, brilinta, statin, bb, amio, imdur, mexitil  and hydralazine. He was instructed to keep his appointment with the pacer clinic and with Dr. Arcelia Collazo in 2 weeks.       Exam:     Vitals:  Vitals:    22 0531 22 0600 22 0605 22 0845 BP: (!) 141/85   (!) 170/84   Pulse: 61   60   Resp: 18  18 18   Temp:    98.4 °F (36.9 °C)   TempSrc:    Oral   SpO2: 97%   98%   Weight:  293 lb (132.9 kg)     Height:         Weight: Weight: 293 lb (132.9 kg)     24 hour intake/output:  Intake/Output Summary (Last 24 hours) at 11/24/2022 1030  Last data filed at 11/23/2022 1750  Gross per 24 hour   Intake 600 ml   Output --   Net 600 ml         General appearance:  No apparent distress, appears stated age and cooperative. HEENT:  Normal cephalic, atraumatic without obvious deformity. Pupils equal, round, and reactive to light. Extra ocular muscles intact. Conjunctivae/corneas clear. Neck: Supple, with full range of motion. No jugular venous distention. Trachea midline. Respiratory:  Normal respiratory effort. Clear to auscultation, bilaterally without Rales/Wheezes/Rhonchi. Cardiovascular:  Regular rate and rhythm with normal S1/S2 without murmurs, rubs or gallops. Abdomen: Soft, non-tender, non-distended with normal bowel sounds. Musculoskeletal:  No clubbing, cyanosis or edema bilaterally. Full range of motion without deformity. Skin: Skin color, texture, turgor normal.  No rashes or lesions. Neurologic:  Neurovascularly intact without any focal sensory/motor deficits. Cranial nerves: II-XII intact, grossly non-focal.  Psychiatric:  Alert and oriented, thought content appropriate, normal insight  Capillary Refill: Brisk,< 3 seconds   Peripheral Pulses: +2 palpable, equal bilaterally       Labs:  For convenience and continuity at follow-up the following most recent labs are provided:      CBC:    Lab Results   Component Value Date/Time    WBC 7.8 11/24/2022 03:44 AM    HGB 10.8 11/24/2022 03:44 AM    HCT 34.1 11/24/2022 03:44 AM     11/24/2022 03:44 AM       Renal:    Lab Results   Component Value Date/Time     11/24/2022 03:44 AM    K 4.6 11/24/2022 03:44 AM    K 3.7 07/08/2022 03:51 AM     11/24/2022 03:44 AM    CO2 23 11/24/2022 03:44 AM    BUN 43 11/24/2022 03:44 AM    CREATININE 2.6 11/24/2022 03:44 AM    CALCIUM 7.9 11/24/2022 03:44 AM    PHOS 3.9 04/22/2022 03:45 PM         Significant Diagnostic Studies    Radiology:   XR CHEST PORTABLE   Final Result   Impression:   Minimal right basilar atelectasis otherwise stable findings in the    comparison exam.      This document has been electronically signed by: Elise Carrasquillo MD on    11/23/2022 01:18 AM             Consults:     STR ED TO IP CONSULT  IP CONSULT TO CARDIOLOGY    Disposition:    [x] Home       [] TCU       [] Rehab       [] Psych       [] SNF       [] [place]       [] Other-    Condition at Discharge: Stable    Code Status:  Full Code     Patient Instructions: Activity: activity as tolerated  Diet: ADULT DIET; Regular; 5 carb choices (75 gm/meal); Low Fat/Low Chol/High Fiber/2 gm Na      Follow-up visits:   Dr. Trisha Holcomb in 2 weeks       Discharge Medications:        Medication List        CHANGE how you take these medications      doxazosin 2 MG tablet  Commonly known as: CARDURA  TAKE 1 TABLET BY MOUTH DAILY  What changed: Another medication with the same name was removed. Continue taking this medication, and follow the directions you see here.             CONTINUE taking these medications      Acetaminophen 650 MG Tabs  Take 650 mg by mouth every 4 hours as needed     ALPRAZolam 0.5 MG tablet  Commonly known as: XANAX  TAKE 1 TABLET BY MOUTH AT BEDTIME FOR ANXIETY     amiodarone 200 MG tablet  Commonly known as: CORDARONE  Take 1 tablet by mouth daily     atorvastatin 80 MG tablet  Commonly known as: LIPITOR  TAKE 1 TABLET BY MOUTH EVERY NIGHT     * blood glucose test strips strip  Commonly known as: William Contour Test  1 each by In Vitro route daily     * OneTouch Ultra strip  Generic drug: blood glucose test strips  USE AS DIRECTED TWICE DAILY     Brilinta 90 MG Tabs tablet  Generic drug: ticagrelor  TAKE 1 TABLET BY MOUTH TWICE DAILY     bumetanide 2 MG tablet  Commonly known as: BUMEX  Take 1 tablet by mouth in the morning. hydrALAZINE 100 MG tablet  Commonly known as: APRESOLINE  TAKE 1 TABLET BY MOUTH THREE TIMES DAILY     hydrOXYzine HCl 25 MG tablet  Commonly known as: ATARAX  Take 1 tablet by mouth nightly as needed for Anxiety     Insulin Pen Needle 31G X 8 MM Misc  1 each by Does not apply route daily     isosorbide mononitrate 120 MG extended release tablet  Commonly known as: IMDUR  Take 1 tablet by mouth daily     Lantus SoloStar 100 UNIT/ML injection pen  Generic drug: insulin glargine  Inject 45 Units into the skin nightly     linagliptin 5 MG tablet  Commonly known as: Tradjenta  TAKE 1 TABLET BY MOUTH DAILY     metoprolol succinate 100 MG extended release tablet  Commonly known as: Toprol XL  Take 1 tablet by mouth daily     mexiletine 150 MG capsule  Commonly known as: MEXITIL  TAKE 2 CAPSULES THREE TIMES DAILY FOR ATRIAL FIBRILLATION     ONE TOUCH ULTRA 2 w/Device Kit  1 kit by Does not apply route 2 times daily     OneTouch Delica Plus [id number] Misc  USE AS DIRECTED TWICE DAILY     pantoprazole 40 MG tablet  Commonly known as: PROTONIX  Take 1 tablet by mouth every morning (before breakfast)     potassium chloride 20 MEQ extended release tablet  Commonly known as: KLOR-CON M  TAKE 1 TABLET BY MOUTH DAILY     warfarin 5 MG tablet  Commonly known as: COUMADIN  USE AS DIRECTED BY COUMADIN CLINIC           * This list has 2 medication(s) that are the same as other medications prescribed for you. Read the directions carefully, and ask your doctor or other care provider to review them with you. STOP taking these medications      losartan 50 MG tablet  Commonly known as: COZAAR     ranolazine 500 MG extended release tablet  Commonly known as: RANEXA              Time Spent on discharge is more than 34 minutes in the examination, evaluation, counseling and review of medications and discharge plan. Signed:     Thank you Russell Weston ISABELLA Wooten CNP for the opportunity to be involved in this patient's care.     Electronically signed by ISABELLA Navarro CNP on 11/24/2022 at 10:30 AM

## 2022-11-24 NOTE — PROGRESS NOTES
Clinical Pharmacy Note                                               Warfarin Discharge Recommendations      Patient discharged from [facility] today on warfarin. Warfarin indication: A fib/flutter  INR goal during admission: 2.0-3.0  Interacting medications at discharge: Amiodarone, Brilinta  Coumadin 5 mg tabs    Recent INRs:  Recent Labs     11/23/22  0050 11/23/22  0446   INR 1.69* 1.85*     Recommendations for discharge:   Date Warfarin Dose   11/24/2022 5 mg   11/25/2022 5 mg   11/26/2022 5 mg   11/27/2022 5 mg   11/28/2022 2.5 mg   11/29/2022 INR     Provider dosing warfarin: [address] INR:  Tuesday, 11/29/2022 at [address] [place] [address].  The clinic is currently closed- call [address] [place] [address] to request appointment for Tuesday, 11/29/22    Delmy Smith PharmD 11/24/2022 11:46 AM

## 2022-11-24 NOTE — DISCHARGE INSTRUCTIONS
Chest Pain: Care Instructions  Overview     There are many things that can cause chest pain. Some are not serious and will get better on their own in a few days. But some kinds of chest pain need more testing and treatment. Your doctor may have recommended a follow-up visit in the next few days. If you are not getting better, you may need more tests or treatment. Even though your doctor has released you, you still need to watch for any problems. The doctor carefully checked you, but sometimes problems can develop later. If you have new symptoms or if your symptoms do not get better, get medical care right away. If you have worse or different chest pain or pressure that lasts more than 5 minutes or you passed out (lost consciousness), call 911 or seek other emergency help right away. A medical visit is only one step in your treatment. Even if you feel better, you still need to do what your doctor recommends, such as going to all suggested follow-up appointments and taking medicines exactly as directed. This will help you recover and help prevent future problems. How can you care for yourself at home? Rest until you feel better. Take your medicine exactly as prescribed. Call your doctor if you think you are having a problem with your medicine. Do not drive after taking a prescription pain medicine. When should you call for help? Call 911 if: You passed out (lost consciousness). You have severe difficulty breathing. You have symptoms of a heart attack. These may include:  Chest pain or pressure, or a strange feeling in your chest.  Sweating. Shortness of breath. Nausea or vomiting. Pain, pressure, or a strange feeling in your back, neck, jaw, or upper belly or in one or both shoulders or arms. Lightheadedness or sudden weakness. A fast or irregular heartbeat. After you call 911, the  may tell you to chew 1 adult-strength or 2 to 4 low-dose aspirin. Wait for an ambulance.  Do not try to drive yourself. Call your doctor now or seek immediate medical care if:    You have any trouble breathing. You have new or different chest pain. You are dizzy or lightheaded, or you feel like you may faint. Watch closely for changes in your health, and be sure to contact your doctor if you do not get better as expected. Where can you learn more? Go to https://TongdapeCHIC.TV.[employer]. org and sign in to your [employer] account. Enter A120 in the [employer] box to learn more about \"Chest Pain: Care Instructions. \"     If you do not have an account, please click on the \"Sign Up Now\" link. Current as of: February 23, 2022               Content Version: 13.4  © 2006-2022 [employer]wise[employer]orporated. Care instructions adapted under license by [facility] ([facility]). If you have questions about a medical condition or this instruction, always ask your healthcare professional. [place] [address] any warranty or liability for your use of this information.

## 2022-11-24 NOTE — PLAN OF CARE
Problem: Discharge Planning  Goal: Discharge to home or other facility with appropriate resources  11/23/2022 2204 by Irineo Payne RN  Outcome: Progressing  Flowsheets  Taken 11/23/2022 2204 by Irineo Payne RN  Discharge to home or other facility with appropriate resources: Identify barriers to discharge with patient and caregiver  Taken 11/23/2022 1535 by Carl Bee RN  Discharge to home or other facility with appropriate resources: Identify barriers to discharge with patient and caregiver  11/23/2022 1345 by [place], RN  Outcome: Progressing  Flowsheets  Taken 11/23/2022 1344 by [place], RN  Discharge to home or other facility with appropriate resources: Identify barriers to discharge with patient and caregiver  Taken 11/23/2022 0814 by Carl Bee RN  Discharge to home or other facility with appropriate resources: Identify barriers to discharge with patient and caregiver  Note: Continue to assess discharge needs. Patient is from home with wife. Echo and Ekg done today. Problem: Pain  Goal: Verbalizes/displays adequate comfort level or baseline comfort level  11/23/2022 2204 by Irineo Payne RN  Outcome: Progressing  Flowsheets (Taken 11/23/2022 2204)  Verbalizes/displays adequate comfort level or baseline comfort level: Encourage patient to monitor pain and request assistance  11/23/2022 1345 by [place], RN  Outcome: Progressing  Flowsheets  Taken 11/23/2022 1345  Verbalizes/displays adequate comfort level or baseline comfort level:   Encourage patient to monitor pain and request assistance   Assess pain using appropriate pain scale  Taken 11/23/2022 0800  Verbalizes/displays adequate comfort level or baseline comfort level: Encourage patient to monitor pain and request assistance  Note: Analgesics given per demand.      Problem: Safety - Adult  Goal: Free from fall injury  11/23/2022 2204 by Irineo Payne RN  Outcome: Progressing  Flowsheets (Taken 11/23/2022 2204)  Free From Fall Injury: Instruct family/caregiver on patient safety  11/23/2022 1345 by Teressa Meraz RN  Outcome: Progressing  Note: Patient is up independently. Call light kept within reach. Problem: ABCDS Injury Assessment  Goal: Absence of physical injury  11/23/2022 2204 by Margarito Samaniego RN  Outcome: Progressing  Flowsheets (Taken 11/23/2022 2204)  Absence of Physical Injury: Implement safety measures based on patient assessment  11/23/2022 1345 by Teressa Meraz RN  Outcome: Progressing     Problem: Chronic Conditions and Co-morbidities  Goal: Patient's chronic conditions and co-morbidity symptoms are monitored and maintained or improved  11/23/2022 2204 by Margarito Samaniego RN  Outcome: Progressing  [address] (Taken 11/23/2022 2204)  Care Plan - Patient's Chronic Conditions and Co-Morbidity Symptoms are Monitored and Maintained or Improved: Monitor and assess patient's chronic conditions and comorbid symptoms for stability, deterioration, or improvement  11/23/2022 1345 by Teressa Meraz RN  Outcome: Progressing  [address] (Taken 11/23/2022 0814 by Thor Puga RN)  Care Plan - Patient's Chronic Conditions and Co-Morbidity Symptoms are Monitored and Maintained or Improved: Monitor and assess patient's chronic conditions and comorbid symptoms for stability, deterioration, or improvement  Note: Patient is sinus rhythm in the monitor.      Problem: Respiratory - Adult  Goal: Achieves optimal ventilation and oxygenation  11/23/2022 2204 by Margarito Samaniego RN  Outcome: Progressing  Flowsheets (Taken 11/23/2022 2204)  Achieves optimal ventilation and oxygenation: Assess for changes in respiratory status  11/23/2022 1345 by Teressa Meraz RN  Outcome: Progressing  Flowsheets  Taken 11/23/2022 1345 by Teressa Meraz RN  Achieves optimal ventilation and oxygenation:   Assess for changes in respiratory status   Assess for changes in mentation and behavior  Taken 11/23/2022 0814 by Thor Puga RN  Achieves optimal ventilation and oxygenation: Assess for changes in respiratory status  Note: Patient is in room air.  Continue to assess any episodes of SOB

## 2022-11-25 ENCOUNTER — CARE COORDINATION (OUTPATIENT)
Dept: CASE MANAGEMENT | Age: 57
End: 2022-11-25

## 2022-11-25 NOTE — CARE COORDINATION
Care Transitions Outreach Attempt    Call within 2 business days of discharge: Yes     1st attempt to reach for Care Transition discharge call unsuccessful. HIPAA compliant message left requesting call back to [phone] . Patient: Cliff Villela Patient : 1965 MRN: <[id number]>  Chest pain Afib Ablation    Last Discharge [address]       Date Complaint Diagnosis Description Type Department Provider    22 Shortness of Breath; Chest Pain Anxiety . .. ED to Hosp-Admission (Discharged) (ADMITTED) STRZ 3B ISABELLA Fields CN. ..        PMH: DM2, CHF, CAD, s/p CABG, Ischemic Cardiomyopathy, ETOH abuse      Was this an external facility discharge?  No Discharge Facility: [facility]    Noted following upcoming appointments from discharge chart review:   [facility] follow up appointment(s):   Future Appointments   Date Time Provider Tate Hunt   2022  3:00 PM SCHEDULE, [facility] PACER NURSE N SRPX PACER [address]   2022  3:40 PM [facility] [address]   2022  4:00 PM STR CT IMAGING RM1  OP EXPRESS STRZ OUT EXP STR Radiolog   2022  2:15 PM Aparna Martinez MD N [address] Heart [address]   5/15/2023  3:30 PM Doris Comer APRN - CNP [id number]   2023 12:45 PM SCHEDULE, [facility] PACER NURSE N [address] [address]   2023  1:00 PM [facility] [address]     Non-[facility] follow up appointment(s): ASHLEY Martin RN -[phone]

## 2022-11-28 ENCOUNTER — OFFICE VISIT (OUTPATIENT)
Dept: NEPHROLOGY | Age: 57
End: 2022-11-28
Payer: COMMERCIAL

## 2022-11-28 ENCOUNTER — CARE COORDINATION (OUTPATIENT)
Dept: CASE MANAGEMENT | Age: 57
End: 2022-11-28

## 2022-11-28 ENCOUNTER — NURSE ONLY (OUTPATIENT)
Dept: CARDIOLOGY CLINIC | Age: 57
End: 2022-11-28

## 2022-11-28 VITALS
WEIGHT: 296.6 LBS | BODY MASS INDEX: 38.08 KG/M2 | HEART RATE: 65 BPM | DIASTOLIC BLOOD PRESSURE: 88 MMHG | OXYGEN SATURATION: 95 % | SYSTOLIC BLOOD PRESSURE: 183 MMHG

## 2022-11-28 DIAGNOSIS — E11.21 DIABETIC NEPHROPATHY ASSOCIATED WITH TYPE 2 DIABETES MELLITUS (HCC): ICD-10-CM

## 2022-11-28 DIAGNOSIS — I10 HTN (HYPERTENSION), BENIGN: ICD-10-CM

## 2022-11-28 DIAGNOSIS — N18.4 CKD (CHRONIC KIDNEY DISEASE) STAGE 4, GFR 15-29 ML/MIN (HCC): Primary | ICD-10-CM

## 2022-11-28 PROCEDURE — 3074F SYST BP LT 130 MM HG: CPT | Performed by: INTERNAL MEDICINE

## 2022-11-28 PROCEDURE — 3078F DIAST BP <80 MM HG: CPT | Performed by: INTERNAL MEDICINE

## 2022-11-28 PROCEDURE — 3051F HG A1C>EQUAL 7.0%<8.0%: CPT | Performed by: INTERNAL MEDICINE

## 2022-11-28 PROCEDURE — 99215 OFFICE O/P EST HI 40 MIN: CPT | Performed by: INTERNAL MEDICINE

## 2022-11-28 RX ORDER — LOSARTAN POTASSIUM 50 MG/1
50 TABLET ORAL DAILY
Qty: 90 TABLET | Refills: 1 | Status: SHIPPED | OUTPATIENT
Start: 2022-11-28

## 2022-11-28 NOTE — PROGRESS NOTES
.Pt here post s/p afib ablation  for incision check. Bilateral groin, and right jugular sites free of hematoma, hard knots, redness, or drainage. No bruising, numbness or tingling noted. Aware to call the office in anything changes. Pt tells me he feels Summary:  Successful electric isolation of pulmonary veins using radiofrequency catheter ablation. Successful cavo-tricuspid isthmus ablation with bidirectional block. Successful electrical isolation of left atrial posterior wall (box lesion). Normal sinus node, AV node and His Purkinje functions. Synchronized electric cardioversion for atrial fibrillation. Recommendations:   Observation for 2 hours. Resume antiarrhythmic drugs, metoprolol and anticoagulation. Proton pump inhibitors for a month. Post operative care per protocol.          Electronically signed by Marlen Mcclure MD, Chacho Russell on 11/21/2022 at 12:25 PM better today then he did last week

## 2022-11-28 NOTE — PROGRESS NOTES
[facility]        Outpatient Follow-Up note         11/28/2022 3:48 PM    Patient Name:   Emy Ocampo:    1965  Primary Care Physician:  ISABELLA Terry CNP     Chief Complaint / Reason for follow-up : Follow Up of CKD      Interval History :  Patient seen and examined by me. No cp or SOB. Patient was recently in the hospital for congestive heart failure. He had ablation done .       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    Anxiety disorder     Arthritis     Atrial fibrillation (HCC)     CAD (coronary artery disease) 3/25/2014    s/p CABG in may 2014    Cardiomyopathy ([address]     Chronic kidney disease     Depression     Diabetes mellitus ([address]     Drug abuse ([address]     hx of cacaine abuse, stopped abusing drugs in 2012    GERD (gastroesophageal reflux disease)     H/O cardiac catheterization 4/30/2014    Hyperlipidemia     Hypertension     Intradural extramedullary spinal tumor     Lumbar spine tumor     Medtronic dual icd      Neuromuscular disorder ([address]     Other disorders of kidney and ureter in diseases classified elsewhere     Paroxysmal atrial fibrillation ([address] 7/22/2014    V tach     V-tach     s/p ablation in dec 2014     Past Surgical History:   Procedure Laterality Date    CARDIAC CATHETERIZATION  3/20/14     [facility]    CARDIAC DEFIBRILLATOR PLACEMENT  10/13/2015    MEDTRONIC [place], MRI CONDITIONAL ICD    CORONARY ARTERY BYPASS GRAFT  5-9-14    3 bypass    EKG 12-LEAD  7/17/2015         OTHER SURGICAL HISTORY Left 10/21/14    Left Bursectomy, I & D Left Elbow - Dr. Evelyn Templeton LAMINECTOMY,>2 [facility] N/A 1/16/2018    LUMBAR AND SACRAL LAMINECTOMY, REMOVAL OF INTRASPINAL TUMORS performed by Pam Cole MD at [place] [address] cabg harvest from legs        Medications :     Outpatient Medications Marked as Taking for the 11/28/22 encounter (Office Visit) with Bernice Parks MD   Medication Sig Dispense Refill    atorvastatin (LIPITOR) 80 MG tablet TAKE 1 TABLET BY MOUTH EVERY NIGHT 30 tablet 11    pantoprazole (PROTONIX) 40 MG tablet Take 1 tablet by mouth every morning (before breakfast) 30 tablet 0    doxazosin (CARDURA) 2 MG tablet TAKE 1 TABLET BY MOUTH DAILY 90 tablet 3    Lancets (ONETOUCH DELICA PLUS [id number]) MISC USE AS DIRECTED TWICE DAILY 200 each 3    BRILINTA 90 MG TABS tablet TAKE 1 TABLET BY MOUTH TWICE DAILY 180 tablet 3    amiodarone (CORDARONE) 200 MG tablet Take 1 tablet by mouth daily 90 tablet 3    ALPRAZolam (XANAX) 0.5 MG tablet TAKE 1 TABLET BY MOUTH AT BEDTIME FOR ANXIETY 30 tablet 5    linagliptin (TRADJENTA) 5 MG tablet TAKE 1 TABLET BY MOUTH DAILY 90 tablet 3    potassium chloride (KLOR-CON M) 20 MEQ extended release tablet TAKE 1 TABLET BY MOUTH DAILY 90 tablet 3    bumetanide (BUMEX) 2 MG tablet Take 1 tablet by mouth in the morning.  90 tablet 3    insulin glargine (LANTUS SOLOSTAR) 100 UNIT/ML injection pen Inject 45 Units into the skin nightly 15 pen 3    hydrALAZINE (APRESOLINE) 100 MG tablet TAKE 1 TABLET BY MOUTH THREE TIMES DAILY 270 tablet 3    blood glucose test strips (ONETOUCH ULTRA) strip USE AS DIRECTED TWICE DAILY 200 strip 3    warfarin (COUMADIN) 5 MG tablet USE AS DIRECTED BY COUMADIN CLINIC 180 tablet 5    isosorbide mononitrate (IMDUR) 120 MG extended release tablet Take 1 tablet by mouth daily 90 tablet 3    metoprolol succinate (TOPROL XL) 100 MG extended release tablet Take 1 tablet by mouth daily 90 tablet 3    mexiletine (MEXITIL) 150 MG capsule TAKE 2 CAPSULES THREE TIMES DAILY FOR ATRIAL FIBRILLATION 540 capsule 3    Blood Glucose Monitoring Suppl (ONE TOUCH ULTRA 2) w/Device KIT 1 kit by Does not apply route 2 times daily 1 kit 0    Insulin Pen Needle 31G X 8 MM MISC 1 each by Does not apply route daily 100 each 3    glucose blood VI test strips (PHIL CONTOUR TEST) strip 1 each by In Vitro route daily 300 each 3    acetaminophen 650 MG TABS Take 650 mg by mouth every 4 hours as needed 120 tablet 3       Vitals     BP (!) 183/88 (Site: Right Upper Arm, Position: Sitting, Cuff Size: Large Adult)   Pulse 65   Wt 296 lb 9.6 oz (134.5 kg)   SpO2 95%   BMI 38.08 kg/m²  Wt Readings from Last 3 Encounters:   11/28/22 296 lb 9.6 oz (134.5 kg)   11/24/22 293 lb (132.9 kg)   11/21/22 290 lb (131.5 kg)        Physical Exam     General -- no distress  Lungs -- clear  Heart -- S1, S2 heard, JVD - no  Abdomen - soft, non-tender  Extremities -- no edema  CNS - awake and alert    Labs, Radiology and Tests    Labs -    5/18 9/18 11/19 6/20 1/22 4/22 7/22 11/22    Potassium 4.4 4.1 4.1 4.1 4.7 4.6 4.7 4.6    BUN 27 27 29 22 42 39 38 43    Creatinine 1.4 1.8 1.7 1.6 2.7 2.5 2.6 2.6    eGFR 53 40 42 45 25 27 26 28                UPCR +(UA) 1.3 6.03 2.5 3.61       UMCR 1.5 854 4168 1776 2512 2715                    4/22 - pth 169, Hb 11.2, Vitamin d - 17  7/22 - pth 129, vitamin -d  19    Serologies -- 9/18  SRIDEVI -- negative  Hepatitis B -- negative  Hepatitis C -- negative  Complements C3 / C4 -- normal  Kappa / Lambda Ratio -- 3.22/2.89-1.11  Serum Protein Electropharesis -- normal pattern      Renal Ultrasound Scan --1/22  Right kidney 12.3 cm left kidney 4.2 cm  Complex lesion in the left kidney, measuring around 3 cm     CT scan of the abdomen - 5/18  1. Findings of constipation. Findings suggestive of cystitis. 2. Moderate bilateral perinephric stranding that has worsened since the study, suggesting possibility of acute bilateral pyelonephritis. Clinical correlation recommended.      Assessment    Renal - patient has CKD stage III most likely secondary to diabetic nephropathy and essential hypertension.  -Overall renal function appears to be stable   -Still has significant proteinuria    essential hypertension running slightly high,start losartan 50 mg po daily  Proteinuria secondary to diabetes and serologic workup is negative. Diabetes mellitus - also start farxiga  BPH on Flomax is urology   Complex renal mass seen by urology  meds reviewed and D/W patient  FU in 4 months    Tests and orders placed this Encounter     No orders of the defined types were placed in this encounter. Georgia Child M.D  [facility].

## 2022-11-28 NOTE — CARE COORDINATION
Care Transitions Outreach Attempt    Call within 2 business days of discharge: Yes     2nd unsuccessful attempt to reach for Care Transition discharge call. HIPAA compliant message left requesting call back. Episode closed per protocol, no further outreach scheduled. Unable to reach letter done, requested  [facility] to please mail out. Patient: Serge Edward Patient : 1965 MRN: <[id number]>    Last Discharge [address]       Date Complaint Diagnosis Description Type Department Provider    22 Shortness of Breath; Chest Pain Anxiety . .. ED to Hosp-Admission (Discharged) (ADMITTED) STRZ 3B Elder Ana Rey, ISABELLA - CN. ..        PMH: CHF, HTN, DM2, CAD, CABG, Ischemic Cardiomyopathy, AICD, ETOH abuse    Was this an external facility discharge?  No Discharge Facility: [facility] following upcoming appointments from discharge chart review:   [address] [place] [address]  follow up appointment(s):   Future Appointments   Date Time Provider Tate Hunt   2022  4:00 PM STR CT IMAGING RM1  OP EXPRESS STRZ OUT EXP STR Radiolog   2022  2:15 PM Andrew Mcmillan MD N [address] [facility] [place]   3/28/2023  3:40 PM Mancil Boas, MD N [facility]. [facility] [place]   5/15/2023  3:30 PM ISABELLA Suresh - CNP [address]   2023 12:45 PM [facility] [address]  [facility] [place]   2023  1:00 PM Louisa Jain MD N SRPX Heart [facility] [place]     Non-[facility] follow up appointment(s): ASHLEY Sherman RN [phone]

## 2022-11-29 NOTE — PROGRESS NOTES
Physician Progress Note      PATIENT:               Anne Grady  CSN #:                  [id number]  :                       1965  ADMIT DATE:       2022 11:55 PM  [address] [place] DATE:        2022 12:15 PM  RESPONDING  PROVIDER #:        Yina Rey CNP          QUERY TEXT:    Pt  presents with acute onset of chest pain and SOB after the procedure. H&P    states \"Chest pain Likely: Post ablation pain vs anxiety. The patient   recently had ablation procedure done 2022, he states that his heart was   shocked 10 times. On labs he has a troponin of 5.3. Which I am attributing   to the ablation procedure\". Progress note  states \"Atypical chest pain -   likely related to recent ablation/shock. Elevated troponins - trending down -   secondary to recent ablation/shock, no clinical evidence for ACS\". If   possible Can you please clarify the etiology o    The medical record reflects the following:  Risk Factors: presents with acute onset of chest pain and SOB after the   procedure. Clinical Indicators: H&P  states \"Chest pain Likely: Post ablation pain   vs anxiety. The patient recently had ablation procedure done 2022, he   states that his heart was shocked 10 times. On labs he has a troponin of 5.3. Which I am attributing to the ablation procedure\". Progress note    states \"Atypical chest pain - likely related to recent ablation/shock  Elevated troponins - trending down - secondary to recent ablation/shock, no   clinical evidence for [facility]  Treatment:  (1) Successful electrocardioversion was performed by a 360 J   synchronized electric shock. OP and Anesthesia Report 2022 :  CARDIAC ELECTROPHYSIOLOGY:  Successful   electrocardioversion was performed by a 360 J synchronized electric shock. Thank you. Please call if you have any questions. (P) [phone].   Signed   by Jeancarlos Sanchez RN Clinical , CRCR  Options provided:  -- Chest pain due to post operative  -- Chest pain not due to post operative  -- Other - I will add my own diagnosis  -- Disagree - Not applicable / Not valid  -- Disagree - Clinically unable to determine / Unknown  -- Refer to Clinical Documentation Reviewer    PROVIDER RESPONSE TEXT:    This patient has chest pain due to post operative.     Query created by: Ciara Najera on 11/29/2022 8:48 AM      Electronically signed by:  Davey Hsu CNP 11/29/2022 9:14 AM

## 2022-12-05 ENCOUNTER — CARE COORDINATION (OUTPATIENT)
Dept: CARE COORDINATION | Age: 57
End: 2022-12-05

## 2022-12-13 ENCOUNTER — PROCEDURE VISIT (OUTPATIENT)
Dept: CARDIOLOGY CLINIC | Age: 57
End: 2022-12-13
Payer: COMMERCIAL

## 2022-12-13 DIAGNOSIS — I50.43 ACUTE ON CHRONIC COMBINED SYSTOLIC AND DIASTOLIC CONGESTIVE HEART FAILURE (HCC): Primary | ICD-10-CM

## 2022-12-13 PROCEDURE — 93297 REM INTERROG DEV EVAL ICPMS: CPT | Performed by: NUCLEAR MEDICINE

## 2022-12-13 PROCEDURE — G2066 INTER DEVC REMOTE 30D: HCPCS | Performed by: NUCLEAR MEDICINE

## 2022-12-13 NOTE — PROGRESS NOTES
[facility] Medtronic [facility] Optivol  Pt of Baki    Battery 18 months    Optivol WNL    Hx afib - taking coumadin  Episodes  11/21/22- 3 runs afib

## 2022-12-16 RX ORDER — PANTOPRAZOLE SODIUM 40 MG/1
TABLET, DELAYED RELEASE ORAL
Qty: 30 TABLET | Refills: 6 | Status: SHIPPED | OUTPATIENT
Start: 2022-12-16

## 2022-12-23 PROBLEM — R77.8 ELEVATED TROPONIN: Status: RESOLVED | Noted: 2022-11-23 | Resolved: 2022-12-23

## 2022-12-23 PROBLEM — R79.89 ELEVATED TROPONIN: Status: RESOLVED | Noted: 2022-11-23 | Resolved: 2022-12-23

## 2022-12-27 NOTE — PATIENT INSTRUCTIONS
May Stop Amiodarone in 2 months ( 03.01.2023)      You may receive a survey regarding the care you received during your visit. Your input is valuable to us. We encourage you to complete and return your survey. We hope you will choose us in the future for your healthcare needs.

## 2022-12-28 ENCOUNTER — OFFICE VISIT (OUTPATIENT)
Dept: CARDIOLOGY CLINIC | Age: 57
End: 2022-12-28
Payer: COMMERCIAL

## 2022-12-28 VITALS
DIASTOLIC BLOOD PRESSURE: 78 MMHG | HEIGHT: 74 IN | HEART RATE: 67 BPM | BODY MASS INDEX: 36.32 KG/M2 | SYSTOLIC BLOOD PRESSURE: 134 MMHG | WEIGHT: 283 LBS | OXYGEN SATURATION: 96 %

## 2022-12-28 DIAGNOSIS — I48.21 PERMANENT ATRIAL FIBRILLATION (HCC): Primary | ICD-10-CM

## 2022-12-28 PROCEDURE — 3078F DIAST BP <80 MM HG: CPT | Performed by: INTERNAL MEDICINE

## 2022-12-28 PROCEDURE — 99214 OFFICE O/P EST MOD 30 MIN: CPT | Performed by: INTERNAL MEDICINE

## 2022-12-28 PROCEDURE — 3074F SYST BP LT 130 MM HG: CPT | Performed by: INTERNAL MEDICINE

## 2022-12-28 NOTE — PROGRESS NOTES
[address] ([place])  [address] [address]  Dept: [phone]  Cardiac Electrophysiology: Follow up Note  Patient's demographics:  Date:   12/28/2022  Patient name:              Nicki Sawyer  YOB: 1965  Sex:    male   MRN:   [id number]    Primary Care Physician:  Chemo Rubi, APRN - CNP    Cardiologist:  Jori Kay MD    Reason for Follow up:  Discuss atrial fibrillation ablation    Clinical Summary:  57/M with symptomatic long standing persistent AF, failed amiodarone and underwent atrial fibrillation ablation (PVI) and CTI ablation (induced flutter) on 11/21/2022. Patient was doing well. Had no palpitations since his ablation. He was recently admitted to the hospital because of his renal failure. Currently follows with Dr. Eneida Collazo. No chest pain, groin complications. Medical Hx: PeAF dx 2014=> recurrence sp DCCV 4/2022 => RF ablation and CTI ablation (11/21/2022). CAD/CABG x3 (5/2014) and PCI-LAD (10/2020), ICM (LVEF 25-30%), VT ablation (2014) on amiodarone and mexilitine, Medtronic secondary prevention DC-AICD by Dr. Leyla Carvajal (10/2014),  anomalous origin of RCA, HTN, HPL, obesity and CKD-III    Review of systems:  Constitutional: Negative for chills and fever  HENT: Negative for congestion, sinus pressure, sneezing and sore throat. Eyes: Negative for pain, discharge, redness and itching. Respiratory: Negative for apnea, cough  Gastrointestinal: Negative for blood in stool, constipation, diarrhea   Endocrine: Negative for cold intolerance, heat intolerance, polydipsia. Genitourinary: Negative for dysuria, enuresis, flank pain and hematuria. Musculoskeletal: Negative for arthralgias, joint swelling and neck pain. Neurological: Negative for numbness and headaches. Psychiatric/Behavioral: Negative for agitation, confusion, decreased concentration and dysphoric mood.       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    Anxiety disorder     Arthritis     Atrial fibrillation (HCC)     CAD (coronary artery disease) 3/25/2014    s/p CABG in may 2014    Cardiomyopathy [facility])     Chronic kidney disease     Depression     Diabetes mellitus ([address]     Drug abuse ([address]     hx of cacaine abuse, stopped abusing drugs in 2012    GERD (gastroesophageal reflux disease)     H/O cardiac catheterization 4/30/2014    Hyperlipidemia     Hypertension     Intradural extramedullary spinal tumor     Lumbar spine tumor     Medtronic dual icd      Neuromuscular disorder ([address]     Other disorders of kidney and ureter in diseases classified elsewhere     Paroxysmal atrial fibrillation ([address] 7/22/2014    V tach     V-tach     s/p ablation in dec 2014       Past Surgical History:  Past Surgical History:   Procedure Laterality Date    CARDIAC CATHETERIZATION  3/20/14     [facility]    CARDIAC DEFIBRILLATOR PLACEMENT  10/13/2015    MEDTRONIC [place], MRI CONDITIONAL ICD    CORONARY ARTERY BYPASS GRAFT  5-9-14    3 bypass    EKG 12-LEAD  7/17/2015         OTHER SURGICAL HISTORY Left 10/21/14    Left Bursectomy, I & D Left Elbow - Dr. Telma Hoyt LAMINECTOMY,>2 [facility] N/A 1/16/2018    LUMBAR AND SACRAL LAMINECTOMY, REMOVAL OF INTRASPINAL TUMORS performed by Sonia Pelaez MD at [address] [place] [address] harvest from legs       Family History:  Family History   Problem Relation Age of Onset    Diabetes Mother     High Blood Pressure Mother     Stroke Mother     Diabetes Father     Heart Disease Father     High Blood Pressure Father     Heart Disease Sister         CABG    Diabetes Maternal Grandmother     Diabetes Maternal Grandfather     Heart Disease Paternal Grandfather         Social History:  Social History     Socioeconomic History Marital status:     Number of children: 3    Years of education: 12    Highest education level: High school graduate   Occupational History     Employer: [employer]   Tobacco Use    Smoking status: Every Day     Packs/day: 0.50     Years: 40.00     Pack years: 20.00     Types: Cigarettes     Start date: 7/16/1980    Smokeless tobacco: Current     Types: Snuff   Vaping Use    Vaping Use: Never used   Substance and Sexual Activity    Alcohol use: Not Currently    Drug use: Not Currently    Sexual activity: Not Currently     Partners: Female     Social Determinants of Health     Financial Resource Strain: Low Risk     Difficulty of Paying Living Expenses: Not hard at all   Food Insecurity: No Food Insecurity    Worried About Running Out of Food in the Last Year: Never true    Ran Out of Food in the Last Year: Never true        Allergies:   Allergies   Allergen Reactions    Latex     Pcn [Penicillins] Hives    Zoloft [Sertraline Hcl] Anxiety     Worsened anxiety        Medications:  Current Outpatient Medications   Medication Sig Dispense Refill    pantoprazole (PROTONIX) 40 MG tablet TAKE 1 TABLET BY MOUTH EVERY MORNING BEFORE BREAKFAST 30 tablet 6    losartan (COZAAR) 50 MG tablet Take 1 tablet by mouth daily 90 tablet 1    dapagliflozin (FARXIGA) 5 MG tablet Take 1 tablet by mouth every morning 90 tablet 1    atorvastatin (LIPITOR) 80 MG tablet TAKE 1 TABLET BY MOUTH EVERY NIGHT 30 tablet 11    doxazosin (CARDURA) 2 MG tablet TAKE 1 TABLET BY MOUTH DAILY 90 tablet 3    Lancets (ONETOUCH DELICA PLUS [id number]) MISC USE AS DIRECTED TWICE DAILY 200 each 3    BRILINTA 90 MG TABS tablet TAKE 1 TABLET BY MOUTH TWICE DAILY 180 tablet 3    amiodarone (CORDARONE) 200 MG tablet Take 1 tablet by mouth daily 90 tablet 3    ALPRAZolam (XANAX) 0.5 MG tablet TAKE 1 TABLET BY MOUTH AT BEDTIME FOR ANXIETY 30 tablet 5    linagliptin (TRADJENTA) 5 MG tablet TAKE 1 TABLET BY MOUTH DAILY 90 tablet 3    potassium chloride (KLOR-CON M) 20 MEQ extended release tablet TAKE 1 TABLET BY MOUTH DAILY 90 tablet 3    bumetanide (BUMEX) 2 MG tablet Take 1 tablet by mouth in the morning. 90 tablet 3    insulin glargine (LANTUS SOLOSTAR) 100 UNIT/ML injection pen Inject 45 Units into the skin nightly 15 pen 3    hydrALAZINE (APRESOLINE) 100 MG tablet TAKE 1 TABLET BY MOUTH THREE TIMES DAILY 270 tablet 3    blood glucose test strips (ONETOUCH ULTRA) strip USE AS DIRECTED TWICE DAILY 200 strip 3    warfarin (COUMADIN) 5 MG tablet USE AS DIRECTED BY COUMADIN CLINIC 180 tablet 5    isosorbide mononitrate (IMDUR) 120 MG extended release tablet Take 1 tablet by mouth daily 90 tablet 3    metoprolol succinate (TOPROL XL) 100 MG extended release tablet Take 1 tablet by mouth daily 90 tablet 3    mexiletine (MEXITIL) 150 MG capsule TAKE 2 CAPSULES THREE TIMES DAILY FOR ATRIAL FIBRILLATION 540 capsule 3    Blood Glucose Monitoring Suppl (ONE TOUCH ULTRA 2) w/Device KIT 1 kit by Does not apply route 2 times daily 1 kit 0    Insulin Pen Needle 31G X 8 MM MISC 1 each by Does not apply route daily 100 each 3    glucose blood VI test strips (PHIL CONTOUR TEST) strip 1 each by In Vitro route daily 300 each 3    acetaminophen 650 MG TABS Take 650 mg by mouth every 4 hours as needed 120 tablet 3     No current facility-administered medications for this visit. Physical Examination:  Vitals:    12/28/22 1406   BP: 134/78   Pulse: 67   SpO2: 96%     GENERAL: Alert and oriented. No distress. EYES: No pallor or icterus. ENT: No cyanosis. No thyromegaly or cervical LAP. VESSELS: No jugular venous distension or carotid bruits. HEART: Irregular S1/S2. No murmur, rub or gallop. LUNGS: Clear to auscultation. CHEST WALL: No erosions, discharge or swelling at device site. ABDOMEN: Soft and non-tender. EXTREMITIES: No lower extremity edema.  Groins are soft and non-tender,    NEUROLOGICAL: Grossly normal.     Laboratory And Diagnostic Data  I have personally reviewed and interpreted the results of the following diagnostic testing    Lab Results   Component Value Date    WBC 7.8 11/24/2022    HGB 10.8 (L) 11/24/2022    HCT 34.1 (L) 11/24/2022     (L) 11/24/2022    CHOL 115 11/15/2022    TRIG 121 11/15/2022    HDL 40 11/15/2022    ALT 23 11/23/2022    AST 40 11/23/2022     11/24/2022    K 4.6 11/24/2022     11/24/2022    CREATININE 2.6 (H) 11/24/2022    BUN 43 (H) 11/24/2022    CO2 23 11/24/2022    TSH 2.650 07/05/2022    INR 1.85 (H) 11/23/2022    LABA1C 7.1 (H) 11/15/2022    LABMICR 173.93 11/17/2022     Echocardiogram LVEF 25-30%, LVWT 0.9 cm, LAD/LANI 70. No significant valvular abnormalities. ECG 4/20/2022: AF with RVR. Normal QRS. ECG 12/28/2022: Sinus rhythm. Coronary angiogram 10/2020: PCI-LAD. LIMA-LAD patent, SVG-D 100% ostial occluded, SVG-RCA patent. Device interrogation Medtronic dual-chamber ICD, longevity more than 5 years, stable electrical parameters. VT/VF 0%.  AT/%   Impression:  Status post A. fib and typical flutter ablation. [id number] 3 (HTN, CHF and hx of MI). On amiodarone and warfarin. CAD/CABG x3 (5/2014) and PCI-LAD (10/2020) on Brilinta. ICM (LVEF 25-30%). Hx VT ablation (2014), on amiodarone and mexilitine, No recurrences. Medtronic secondary prevention DC-AICD. Normal functions. CKD-III      Assessment And Recommendations:  Patient is doing well. Asymptomatic. In sinus rhythm. No documented recurrences. Discontinue amiodarone 2 months. Continue anticoagulation lifelong. Follow-up in 1 year. Thank you for allowing me to participate in the care of your patient. Please call me if you have any questions. **This report has been created using voice recognition software. It may contain minor errors which are inherent in voice recognition technology. **     Maria L Cartwright MD, MRCP, [address] [facility], [facility] on 12/28/2022 at 3:05 PM

## 2023-01-13 DIAGNOSIS — E11.22 CONTROLLED TYPE 2 DIABETES MELLITUS WITH CHRONIC KIDNEY DISEASE, WITHOUT LONG-TERM CURRENT USE OF INSULIN, UNSPECIFIED CKD STAGE (HCC): ICD-10-CM

## 2023-01-13 RX ORDER — PEN NEEDLE, DIABETIC 31 GX5/16"
NEEDLE, DISPOSABLE MISCELLANEOUS
Qty: 100 EACH | Refills: 3 | Status: SHIPPED | OUTPATIENT
Start: 2023-01-13

## 2023-01-17 ENCOUNTER — PROCEDURE VISIT (OUTPATIENT)
Dept: CARDIOLOGY CLINIC | Age: 58
End: 2023-01-17
Payer: COMMERCIAL

## 2023-01-17 DIAGNOSIS — Z95.810 S/P ICD (INTERNAL CARDIAC DEFIBRILLATOR) PROCEDURE: Primary | ICD-10-CM

## 2023-01-17 PROCEDURE — 93295 DEV INTERROG REMOTE 1/2/MLT: CPT | Performed by: NUCLEAR MEDICINE

## 2023-01-17 PROCEDURE — 93296 REM INTERROG EVL PM/IDS: CPT | Performed by: NUCLEAR MEDICINE

## 2023-01-17 NOTE — PROGRESS NOTES
[employer] Medtronic Dual ICD   Pt of Baki    Battery 17 months     Presenting rhythm ASVS    A Impedance 456  RV Impedance 342    RV shock 61    P wave sensing 1.4  R wave sensing 6.9    A Threshold 0.75 @ 0.40  A Amplitude 1.50 @ 0.40  RV Thresholds 0.75 @ 0.40  RV Amplitude 1.5 @ 0.40    A Paced 0.5%  V Paced <0.1%    Programmed Mode AAI <=> DDD     Afib [place] 0%    Episodes:   none    Optivol WNL

## 2023-02-28 RX ORDER — MEXILETINE HYDROCHLORIDE 150 MG/1
CAPSULE ORAL
Qty: 540 CAPSULE | Refills: 3 | Status: SHIPPED | OUTPATIENT
Start: 2023-02-28

## 2023-02-28 NOTE — TELEPHONE ENCOUNTER
Roseann Goon called requesting a refill on the following medications:  Requested Prescriptions     Pending Prescriptions Disp Refills    mexiletine (MEXITIL) 150 MG capsule 540 capsule 3     Sig: TAKE 2 190 Suzy Hayward verified:aramis fam      Date of last visit:   Date of next visit (if applicable): 2/73/9824      Patient is asking for a 90 day supply

## 2023-03-05 DIAGNOSIS — F41.3 OTHER MIXED ANXIETY DISORDERS: ICD-10-CM

## 2023-03-06 RX ORDER — ESCITALOPRAM OXALATE 10 MG/1
10 TABLET ORAL DAILY
Qty: 90 TABLET | Refills: 1 | Status: SHIPPED | OUTPATIENT
Start: 2023-03-06

## 2023-03-07 RX ORDER — METOPROLOL SUCCINATE 100 MG/1
100 TABLET, EXTENDED RELEASE ORAL DAILY
Qty: 90 TABLET | Refills: 0 | Status: SHIPPED | OUTPATIENT
Start: 2023-03-07

## 2023-03-17 ENCOUNTER — TELEPHONE (OUTPATIENT)
Dept: CARDIOLOGY CLINIC | Age: 58
End: 2023-03-17

## 2023-03-17 NOTE — LETTER
March 17, 2023       [facility] [address]      Dear Darline Quarleser: Sandeep Phillips We have been unable to contact you by phone. Our office did not receive your Carelink transmission which was scheduled for February 21 and March 14, 2023     Please check the connections and send a manual download ASAP. If you need help sending a download, please call [facility] [employer] at [phone]. Our office is not responsible for missed transmissions. Please call our office at [phone] if you have questions for the pacemaker/ICD clinic     Thank You for your assistance!     [facility] Pacemaker/ICD clinic         Gemma Kapadia MD

## 2023-03-31 NOTE — PROGRESS NOTES
[address] (EP)  [address] [address]  Dept: [phone]  Cardiac Electrophysiology: Follow up Note  Patient's demographics:  Date:   11/9/2022  Patient name:              Fabi Mullins  YOB: 1965  Sex:    male   MRN:   [id number]    Primary Care Physician:  Francisca Hodgkins, APRN - CNP    Cardiologist:  Mago Ybarra MD    Reason for Follow up:  Discuss atrial fibrillation ablation    Clinical Summary:  56/M with history of symptomatic atrial fibrillation on amiodarone has recurrence of atrial fibrillation and underwent DCCV. He is here to discuss catheter ablation for which he was seen in EP clinic during his last visit. He is hoping to get off amiodarone. Complains of palpitation under diuresis,. Denies shortness of breath, syncope, ICD shocks or lower extremity swelling. No chest pain. He tripped and fell down this morning scraping his knees and elbows. No significant bleeding. Compliant with medications. Medical Hx: PeAF dx 2014=> recurrence sp DCCV 4/2022, CAD/CABG x3 (5/2014) and PCI-LAD (10/2020), ICM (LVEF 25-30%), VT ablation (2014) on amiodarone and mexilitine, Medtronic secondary prevention DC-AICD by Dr. Deborah Raymond (10/2014),  anomalous origin of RCA, HTN, HPL, obesity,     Review of systems:  Constitutional: Negative for chills and fever  HENT: Negative for congestion, sinus pressure, sneezing and sore throat. Eyes: Negative for pain, discharge, redness and itching. Respiratory: Negative for apnea, cough  Gastrointestinal: Negative for blood in stool, constipation, diarrhea   Endocrine: Negative for cold intolerance, heat intolerance, polydipsia. Genitourinary: Negative for dysuria, enuresis, flank pain and hematuria. Musculoskeletal: Negative for arthralgias, joint swelling and neck pain. Neurological: Negative for numbness and headaches.    Psychiatric/Behavioral: Negative for agitation, confusion, decreased concentration and dysphoric mood.       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    Anxiety disorder     Arthritis     Atrial fibrillation (HCC)     CAD (coronary artery disease) 3/25/2014    s/p CABG in may 2014    Cardiomyopathy ([address]     Chronic kidney disease     Depression     Diabetes mellitus ([address]     Drug abuse ([address]     hx of cacaine abuse, stopped abusing drugs in 2012    GERD (gastroesophageal reflux disease)     H/O cardiac catheterization 4/30/2014    Hyperlipidemia     Hypertension     Intradural extramedullary spinal tumor     Lumbar spine tumor     Medtronic dual icd      Neuromuscular disorder ([address]     Other disorders of kidney and ureter in diseases classified elsewhere     Paroxysmal atrial fibrillation ([address] 7/22/2014    V tach     V-tach     s/p ablation in dec 2014       Past Surgical History:  Past Surgical History:   Procedure Laterality Date    CARDIAC CATHETERIZATION  3/20/14     [facility]    CARDIAC DEFIBRILLATOR PLACEMENT  10/13/2015    MEDTRONIC [place], MRI CONDITIONAL ICD    CORONARY ARTERY BYPASS GRAFT  5-9-14    3 bypass    EKG 12-LEAD  7/17/2015         OTHER SURGICAL HISTORY Left 10/21/14    Left Bursectomy, I & D Left Elbow - Dr. Penn Lav LAMINECTOMY,>2 [facility] N/A 1/16/2018    LUMBAR AND SACRAL LAMINECTOMY, REMOVAL OF INTRASPINAL TUMORS performed by Red Espinoza MD at [address] [place] [address] harvest from legs       Family History:  Family History   Problem Relation Age of Onset    Diabetes Mother     High Blood Pressure Mother     Stroke Mother     Diabetes Father     Heart Disease Father     High Blood Pressure Father     Heart Disease Sister         CABG    Diabetes Maternal Grandmother     Diabetes Maternal Grandfather     Heart Disease Paternal Grandfather         Social History:  Social History     Socioeconomic History    Marital status:     Number of children: 3    Years of education: 12    Highest education level: High school graduate   Occupational History     Employer: [employer]   Tobacco Use    Smoking status: Some Days     Packs/day: 0.50     Years: 40.00     Pack years: 20.00     Types: Cigarettes     Start date: 7/16/1980    Smokeless tobacco: Current     Types: Snuff   Vaping Use    Vaping Use: Never used   Substance and Sexual Activity    Alcohol use: Yes     Alcohol/week: 0.0 standard drinks     Comment: states drinking \"sometimes\"    Drug use: Yes     Types: Cocaine    Sexual activity: Not Currently     Partners: Female        Allergies: Allergies   Allergen Reactions    Latex     Pcn [Penicillins] Hives    Zoloft [Sertraline Hcl] Anxiety     Worsened anxiety        Medications:  Current Outpatient Medications   Medication Sig Dispense Refill    ALPRAZolam (XANAX) 0.5 MG tablet TAKE 1 TABLET BY MOUTH AT BEDTIME FOR ANXIETY 30 tablet 5    linagliptin (TRADJENTA) 5 MG tablet TAKE 1 TABLET BY MOUTH DAILY 90 tablet 3    potassium chloride (KLOR-CON M) 20 MEQ extended release tablet TAKE 1 TABLET BY MOUTH DAILY 90 tablet 3    escitalopram (LEXAPRO) 10 MG tablet TAKE 1 TABLET BY MOUTH DAILY 90 tablet 1    BRILINTA 90 MG TABS tablet TAKE 1 TABLET BY MOUTH TWICE DAILY 180 tablet 0    bumetanide (BUMEX) 2 MG tablet Take 1 tablet by mouth in the morning. 90 tablet 3    doxazosin (CARDURA) 4 MG tablet Take 1 tablet by mouth in the morning. 90 tablet 1    ranolazine (RANEXA) 500 MG extended release tablet Take 1 tablet by mouth in the morning and 1 tablet before bedtime.  180 tablet 3    insulin glargine (LANTUS SOLOSTAR) 100 UNIT/ML injection pen Inject 45 Units into the skin nightly 15 pen 3    nicotine (NICODERM CQ) 21 MG/24HR Place 1 patch onto the skin daily 42 patch 0    nicotine polacrilex (COMMIT) 2 MG lozenge Take 1 Stable lozenge by mouth as needed for Smoking cessation 100 each 3    hydrALAZINE (APRESOLINE) 100 MG tablet TAKE 1 TABLET BY MOUTH THREE TIMES DAILY 270 tablet 3    atorvastatin (LIPITOR) 80 MG tablet TAKE 1 TABLET BY MOUTH EVERY NIGHT 30 tablet 5    blood glucose test strips (ONETOUCH ULTRA) strip USE AS DIRECTED TWICE DAILY 200 strip 3    warfarin (COUMADIN) 5 MG tablet USE AS DIRECTED BY COUMADIN CLINIC 180 tablet 5    isosorbide mononitrate (IMDUR) 120 MG extended release tablet Take 1 tablet by mouth daily 90 tablet 3    metoprolol succinate (TOPROL XL) 100 MG extended release tablet Take 1 tablet by mouth daily 90 tablet 3    mexiletine (MEXITIL) 150 MG capsule TAKE 2 CAPSULES THREE TIMES DAILY FOR ATRIAL FIBRILLATION 540 capsule 3    ONE TOUCH ULTRASOFT LANCETS MISC USE AS DIRECTED TWICE DAILY. 200 each 3    Blood Glucose Monitoring Suppl (ONE TOUCH ULTRA 2) w/Device KIT 1 kit by Does not apply route 2 times daily 1 kit 0    Insulin Pen Needle 31G X 8 MM MISC 1 each by Does not apply route daily 100 each 3    amiodarone (CORDARONE) 200 MG tablet Take 1 tablet by mouth daily 90 tablet 3    losartan (COZAAR) 50 MG tablet TAKE 1 TABLET BY MOUTH DAILY 30 tablet 0    glucose blood VI test strips (PHIL CONTOUR TEST) strip 1 each by In Vitro route daily 300 each 3    acetaminophen 650 MG TABS Take 650 mg by mouth every 4 hours as needed 120 tablet 3     No current facility-administered medications for this visit. Physical Examination:  Vitals:    11/09/22 1305   BP: (!) 148/104   Pulse: 97   SpO2: 99%     Body mass index is 37.59 kg/m². GENERAL: Alert and oriented. No distress. EYES: No pallor or icterus. ENT: No cyanosis. No thyromegaly or cervical LAP. VESSELS: No jugular venous distension or carotid bruits. HEART: Irregular S1/S2. No murmur, rub or gallop. LUNGS: Clear to auscultation. CHEST WALL: No erosions, discharge or swelling at device site. ABDOMEN: Soft and non-tender.    EXTREMITIES: No lower extremity edema. Feet are warm. NEUROLOGICAL: Grossly normal.     Laboratory And Diagnostic Data  I have personally reviewed and interpreted the results of the following diagnostic testing    Lab Results   Component Value Date    WBC 7.6 07/18/2022    HGB 11.5 (L) 07/18/2022    HCT 35.9 (L) 07/18/2022     07/18/2022    CHOL 115 04/20/2022    TRIG 73 04/20/2022    HDL 39 04/20/2022    ALT 47 07/18/2022    AST 41 (H) 07/18/2022     07/18/2022    K 4.7 07/18/2022     07/18/2022    CREATININE 2.6 (H) 07/18/2022    BUN 38 (H) 07/18/2022    CO2 20 (L) 07/18/2022    TSH 2.650 07/05/2022    INR 2.70 09/22/2022    LABA1C 6.8 (H) 07/05/2022    LABMICR 211.76 04/22/2022     Echocardiogram LVEF 25-30%, LVWT 0.9 cm, LAD/LANI 70. No significant valvular abnormalities. ECG 4/20/2022: AF with RVR. Normal QRS. Coronary angiogram 10/2020: PCI-LAD. LIMA-LAD patent, SVG-D 100% ostial occluded, SVG-RCA patent. Device interrogation Medtronic dual-chamber ICD, longevity more than 5 years, stable electrical parameters. VT/VF 0%.  AT/%   Impression:  Symptomatic PeAF, rate controlled. [id number] score 3 (HTN, CHF and hx of MI). On amiodarone and warfarin. CAD/CABG x3 (5/2014) and PCI-LAD (10/2020) on Brilinta. ICM (LVEF 25-30%). Hx VT ablation (2014), on amiodarone and mexilitine, No recurrences. Medtronic secondary prevention DC-AICD. Normal functions. Assessment And Recommendations: The patient unfortunately had recurrences of atrial fibrillation. He has been ventricular fibrillation for at least 3 months. Failed amiodarone therapy. Since he will be benefited by radiofrequency catheter ablation. This has been discussed with the patient in the past as well. He is not very. The risk and benefits discussed. Questions answered. Thank you for allowing me to participate in the care of your patient. Please call me if you have any questions.     **This report has been created using voice recognition software. It may contain minor errors which are inherent in voice recognition technology. **     Emily Gutierrez MD, MRCP, [facility], [facility] on 11/9/2022 at 1:46 PM

## 2023-04-03 ENCOUNTER — TELEPHONE (OUTPATIENT)
Dept: FAMILY MEDICINE CLINIC | Age: 58
End: 2023-04-03

## 2023-04-03 NOTE — TELEPHONE ENCOUNTER
Pt called office stating he will be dropping off FMLA paperwork today before 4pm. This is a renewal, he did not realize his FMLA .

## 2023-04-06 NOTE — TELEPHONE ENCOUNTER
Pt called office. Was notified Marlen Wakefield was completed and faxed in on 4/4/23, see Media Tab.

## 2023-04-18 ENCOUNTER — PROCEDURE VISIT (OUTPATIENT)
Dept: CARDIOLOGY CLINIC | Age: 58
End: 2023-04-18
Payer: COMMERCIAL

## 2023-04-18 DIAGNOSIS — Z95.810 S/P ICD (INTERNAL CARDIAC DEFIBRILLATOR) PROCEDURE: Primary | ICD-10-CM

## 2023-04-18 PROCEDURE — 93295 DEV INTERROG REMOTE 1/2/MLT: CPT | Performed by: NUCLEAR MEDICINE

## 2023-04-18 PROCEDURE — 93296 REM INTERROG EVL PM/IDS: CPT | Performed by: NUCLEAR MEDICINE

## 2023-04-18 NOTE — PROGRESS NOTES
Strava Medtronic Dual ICD   Pt of Baki    Battery 16 months     Presenting rhythm ASVS    A Impedance 437  RV Impedance 323    RV shock 62    P wave sensing 1.3  R wave sensing 7.1    A Threshold 0.625 @ 0.40  A Amplitude 1.5 @ 0.40  RV Thresholds 0.75 @ 0.40  RV Amplitude 1.5 @ 0.40    A Paced 0.3%  V Paced <0.1%    Programmed Mode AAI <=> DDD    Afib [place] 0%    Episodes:   none    Optivol WNL

## 2023-04-19 DIAGNOSIS — F41.3 OTHER MIXED ANXIETY DISORDERS: ICD-10-CM

## 2023-04-19 RX ORDER — ALPRAZOLAM 0.5 MG/1
TABLET ORAL
Qty: 30 TABLET | Refills: 5 | Status: SHIPPED | OUTPATIENT
Start: 2023-04-19 | End: 2023-10-16

## 2023-04-19 NOTE — TELEPHONE ENCOUNTER
Patient requesting refill of Xanax to [address] [place] [address]. Please refill if appropriate.   Will check with pharmacy after 1pm

## 2023-05-22 ENCOUNTER — OFFICE VISIT (OUTPATIENT)
Dept: FAMILY MEDICINE CLINIC | Age: 58
End: 2023-05-22
Payer: COMMERCIAL

## 2023-05-22 VITALS
WEIGHT: 274.6 LBS | HEART RATE: 80 BPM | HEIGHT: 74 IN | RESPIRATION RATE: 16 BRPM | BODY MASS INDEX: 35.24 KG/M2 | SYSTOLIC BLOOD PRESSURE: 132 MMHG | DIASTOLIC BLOOD PRESSURE: 70 MMHG

## 2023-05-22 DIAGNOSIS — I48.21 PERMANENT ATRIAL FIBRILLATION (HCC): ICD-10-CM

## 2023-05-22 DIAGNOSIS — I50.22 CHRONIC HFREF (HEART FAILURE WITH REDUCED EJECTION FRACTION) (HCC): ICD-10-CM

## 2023-05-22 DIAGNOSIS — I25.709 CORONARY ARTERY DISEASE INVOLVING CORONARY BYPASS GRAFT OF NATIVE HEART WITH ANGINA PECTORIS (HCC): ICD-10-CM

## 2023-05-22 DIAGNOSIS — D68.9 COAGULOPATHY (HCC): ICD-10-CM

## 2023-05-22 DIAGNOSIS — N18.4 CKD (CHRONIC KIDNEY DISEASE) STAGE 4, GFR 15-29 ML/MIN (HCC): ICD-10-CM

## 2023-05-22 DIAGNOSIS — E11.22 CONTROLLED TYPE 2 DIABETES MELLITUS WITH CHRONIC KIDNEY DISEASE, WITHOUT LONG-TERM CURRENT USE OF INSULIN, UNSPECIFIED CKD STAGE (HCC): Primary | ICD-10-CM

## 2023-05-22 DIAGNOSIS — F10.231 ALCOHOL DEPENDENCE WITH WITHDRAWAL DELIRIUM (HCC): ICD-10-CM

## 2023-05-22 DIAGNOSIS — D33.4 SCHWANNOMA OF SPINAL CORD (HCC): ICD-10-CM

## 2023-05-22 DIAGNOSIS — E66.01 SEVERE OBESITY (BMI 35.0-39.9) WITH COMORBIDITY (HCC): ICD-10-CM

## 2023-05-22 PROCEDURE — 3078F DIAST BP <80 MM HG: CPT | Performed by: NURSE PRACTITIONER

## 2023-05-22 PROCEDURE — 99214 OFFICE O/P EST MOD 30 MIN: CPT | Performed by: NURSE PRACTITIONER

## 2023-05-22 PROCEDURE — 3074F SYST BP LT 130 MM HG: CPT | Performed by: NURSE PRACTITIONER

## 2023-05-22 SDOH — ECONOMIC STABILITY: HOUSING INSECURITY
IN THE LAST 12 MONTHS, WAS THERE A TIME WHEN YOU DID NOT HAVE A STEADY PLACE TO SLEEP OR SLEPT IN A SHELTER (INCLUDING NOW)?: NO

## 2023-05-22 SDOH — ECONOMIC STABILITY: INCOME INSECURITY: HOW HARD IS IT FOR YOU TO PAY FOR THE VERY BASICS LIKE FOOD, HOUSING, MEDICAL CARE, AND HEATING?: NOT HARD AT ALL

## 2023-05-22 SDOH — ECONOMIC STABILITY: FOOD INSECURITY: WITHIN THE PAST 12 MONTHS, THE FOOD YOU BOUGHT JUST DIDN'T LAST AND YOU DIDN'T HAVE MONEY TO GET MORE.: NEVER TRUE

## 2023-05-22 SDOH — ECONOMIC STABILITY: FOOD INSECURITY: WITHIN THE PAST 12 MONTHS, YOU WORRIED THAT YOUR FOOD WOULD RUN OUT BEFORE YOU GOT MONEY TO BUY MORE.: NEVER TRUE

## 2023-05-22 ASSESSMENT — ENCOUNTER SYMPTOMS
NAUSEA: 0
ABDOMINAL PAIN: 0
SHORTNESS OF BREATH: 0
COUGH: 0

## 2023-05-22 ASSESSMENT — PATIENT HEALTH QUESTIONNAIRE - PHQ9
SUM OF ALL RESPONSES TO PHQ QUESTIONS 1-9: 0
8. MOVING OR SPEAKING SO SLOWLY THAT OTHER PEOPLE COULD HAVE NOTICED. OR THE OPPOSITE, BEING SO FIGETY OR RESTLESS THAT YOU HAVE BEEN MOVING AROUND A LOT MORE THAN USUAL: 0
SUM OF ALL RESPONSES TO PHQ QUESTIONS 1-9: 0
6. FEELING BAD ABOUT YOURSELF - OR THAT YOU ARE A FAILURE OR HAVE LET YOURSELF OR YOUR FAMILY DOWN: 0
1. LITTLE INTEREST OR PLEASURE IN DOING THINGS: 0
7. TROUBLE CONCENTRATING ON THINGS, SUCH AS READING THE NEWSPAPER OR WATCHING TELEVISION: 0
2. FEELING DOWN, DEPRESSED OR HOPELESS: 0
5. POOR APPETITE OR OVEREATING: 0
3. TROUBLE FALLING OR STAYING ASLEEP: 0
SUM OF ALL RESPONSES TO PHQ QUESTIONS 1-9: 0
4. FEELING TIRED OR HAVING LITTLE ENERGY: 0
SUM OF ALL RESPONSES TO PHQ9 QUESTIONS 1 & 2: 0
9. THOUGHTS THAT YOU WOULD BE BETTER OFF DEAD, OR OF HURTING YOURSELF: 0
SUM OF ALL RESPONSES TO PHQ QUESTIONS 1-9: 0
10. IF YOU CHECKED OFF ANY PROBLEMS, HOW DIFFICULT HAVE THESE PROBLEMS MADE IT FOR YOU TO DO YOUR WORK, TAKE CARE OF THINGS AT HOME, OR GET ALONG WITH OTHER PEOPLE: 0
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
## 2023-05-22 NOTE — PATIENT INSTRUCTIONS
You may receive a survey about your visit with us today. The feedback from our patients helps us identify what is working well and where the service to all patients can be enhanced. Thank you! Tobacco Cessation Programs     Telephonic behavior modification  1-800-QUIT-NOW (676-4157)  Counseling service for those who are ready to quit using tobacco.    Available for uninsured [place] residents, Medicaid recipients, pregnant women, or patients whose health plans or employers are members of the [address] [place] [address] behavior modification  http://[place]. [employer]uitlogix. org  Online support program to help patients through each step of the quitting process. Available 24 hours a day 7 days a week. Provides up to date researched based tool, step-by-step guides, and motivational messages. Online behavior modification  www.lungusa.org/stop-smoking/how-to-quit  HelpLine: 1-800-LUNG-USA (341-7940)  Email questions to:  Oral@[employer]. [place]   Website offers resources to help tobacco users figure out their reasons for quitting and then take the big step of quitting for good. Hypnosis  Location: [address] [place] [address], [place], [place]  Contact: Wood Rodriguez, PhD at [phone]  Hypnosis for tobacco cessation  Cost $225 for the initial session and $175 for each session afterwards. Most patients require 6-8 sessions. There is the option to submit through the patients insurance. Hypnosis and behavior modification  Location: [place] [address],  [facility] [place] [address] [place], [place]  Contact: Sandip Zayas, PhD at [phone]  Counseling and hypnosis for nicotine addition  Cost: For uninsured patients:  Please call above phone number  Cost for insured patients depends on patients insurance plan.     Behavior modification  Location: [facility], [address] [place] [address] [address]: [phone]  Services include four one-on-one appointments between the patient and a respiratory therapist.  The four appointments

## 2023-05-23 ENCOUNTER — PROCEDURE VISIT (OUTPATIENT)
Dept: CARDIOLOGY CLINIC | Age: 58
End: 2023-05-23
Payer: COMMERCIAL

## 2023-05-23 DIAGNOSIS — I50.43 ACUTE ON CHRONIC COMBINED SYSTOLIC AND DIASTOLIC CONGESTIVE HEART FAILURE (HCC): Primary | ICD-10-CM

## 2023-05-23 PROCEDURE — G2066 INTER DEVC REMOTE 30D: HCPCS | Performed by: NUCLEAR MEDICINE

## 2023-05-23 PROCEDURE — 93297 REM INTERROG DEV EVAL ICPMS: CPT | Performed by: NUCLEAR MEDICINE

## 2023-05-30 RX ORDER — LOSARTAN POTASSIUM 50 MG/1
50 TABLET ORAL DAILY
Qty: 90 TABLET | Refills: 1 | Status: SHIPPED | OUTPATIENT
Start: 2023-05-30

## 2023-06-24 ENCOUNTER — APPOINTMENT (OUTPATIENT)
Dept: GENERAL RADIOLOGY | Age: 58
End: 2023-06-24
Payer: COMMERCIAL

## 2023-06-24 ENCOUNTER — HOSPITAL ENCOUNTER (EMERGENCY)
Age: 58
Discharge: HOME | End: 2023-06-24
Payer: COMMERCIAL

## 2023-06-24 VITALS
OXYGEN SATURATION: 97 % | RESPIRATION RATE: 20 BRPM | HEART RATE: 82 BPM | TEMPERATURE: 98.3 F | SYSTOLIC BLOOD PRESSURE: 182 MMHG | DIASTOLIC BLOOD PRESSURE: 95 MMHG

## 2023-06-24 DIAGNOSIS — M53.3 SACROILIAC PAIN: ICD-10-CM

## 2023-06-24 DIAGNOSIS — M43.06 LUMBAR SPONDYLOLYSIS: ICD-10-CM

## 2023-06-24 DIAGNOSIS — N18.9 CHRONIC KIDNEY DISEASE, UNSPECIFIED CKD STAGE: ICD-10-CM

## 2023-06-24 DIAGNOSIS — M51.36 DDD (DEGENERATIVE DISC DISEASE), LUMBAR: Primary | ICD-10-CM

## 2023-06-24 LAB
ANION GAP SERPL CALC-SCNC: 14 MEQ/L (ref 8–16)
BASOPHILS ABSOLUTE: 0.1 THOU/MM3 (ref 0–0.1)
BASOPHILS NFR BLD AUTO: 1.2 %
BUN SERPL-MCNC: 34 MG/DL (ref 7–22)
CALCIUM SERPL-MCNC: 8.3 MG/DL (ref 8.5–10.5)
CHLORIDE SERPL-SCNC: 106 MEQ/L (ref 98–111)
CO2 SERPL-SCNC: 18 MEQ/L (ref 23–33)
CREAT SERPL-MCNC: 2.6 MG/DL (ref 0.4–1.2)
DEPRECATED RDW RBC AUTO: 53.1 FL (ref 35–45)
EOSINOPHIL NFR BLD AUTO: 4.3 %
EOSINOPHILS ABSOLUTE: 0.3 THOU/MM3 (ref 0–0.4)
ERYTHROCYTE [DISTWIDTH] IN BLOOD BY AUTOMATED COUNT: 14.1 % (ref 11.5–14.5)
GFR SERPL CREATININE-BSD FRML MDRD: 28 ML/MIN/1.73M2
GLUCOSE SERPL-MCNC: 113 MG/DL (ref 70–108)
HCT VFR BLD AUTO: 34.8 % (ref 42–52)
HGB BLD-MCNC: 11.1 GM/DL (ref 14–18)
IMM GRANULOCYTES # BLD AUTO: 0.02 THOU/MM3 (ref 0–0.07)
IMM GRANULOCYTES NFR BLD AUTO: 0.3 %
LYMPHOCYTES ABSOLUTE: 0.7 THOU/MM3 (ref 1–4.8)
LYMPHOCYTES NFR BLD AUTO: 10.3 %
MCH RBC QN AUTO: 32.7 PG (ref 26–33)
MCHC RBC AUTO-ENTMCNC: 31.9 GM/DL (ref 32.2–35.5)
MCV RBC AUTO: 102.7 FL (ref 80–94)
MONOCYTES ABSOLUTE: 0.7 THOU/MM3 (ref 0.4–1.3)
MONOCYTES NFR BLD AUTO: 10.5 %
NEUTROPHILS NFR BLD AUTO: 73.4 %
NRBC BLD AUTO-RTO: 0 /100 WBC
OSMOLALITY SERPL CALC.SUM OF ELEC: 284.1 MOSMOL/KG (ref 275–300)
PLATELET # BLD AUTO: 142 THOU/MM3 (ref 130–400)
PMV BLD AUTO: 9.7 FL (ref 9.4–12.4)
POTASSIUM SERPL-SCNC: 4.8 MEQ/L (ref 3.5–5.2)
RBC # BLD AUTO: 3.39 MILL/MM3 (ref 4.7–6.1)
SEGMENTED NEUTROPHILS ABSOLUTE COUNT: 5 THOU/MM3 (ref 1.8–7.7)
SODIUM SERPL-SCNC: 138 MEQ/L (ref 135–145)
WBC # BLD AUTO: 6.8 THOU/MM3 (ref 4.8–10.8)

## 2023-06-24 PROCEDURE — 72100 X-RAY EXAM L-S SPINE 2/3 VWS: CPT

## 2023-06-24 PROCEDURE — 96374 THER/PROPH/DIAG INJ IV PUSH: CPT

## 2023-06-24 PROCEDURE — 6370000000 HC RX 637 (ALT 250 FOR IP): Performed by: NURSE PRACTITIONER

## 2023-06-24 PROCEDURE — 99284 EMERGENCY DEPT VISIT MOD MDM: CPT

## 2023-06-24 PROCEDURE — 80048 BASIC METABOLIC PNL TOTAL CA: CPT

## 2023-06-24 PROCEDURE — 36415 COLL VENOUS BLD VENIPUNCTURE: CPT

## 2023-06-24 PROCEDURE — 85025 COMPLETE CBC W/AUTO DIFF WBC: CPT

## 2023-06-24 PROCEDURE — 6360000002 HC RX W HCPCS: Performed by: NURSE PRACTITIONER

## 2023-06-24 RX ORDER — TIZANIDINE 4 MG/1
4 TABLET ORAL ONCE
Status: COMPLETED | OUTPATIENT
Start: 2023-06-24 | End: 2023-06-24

## 2023-06-24 RX ORDER — PREDNISONE 20 MG/1
TABLET ORAL
Qty: 18 TABLET | Refills: 0 | Status: SHIPPED | OUTPATIENT
Start: 2023-06-24 | End: 2023-06-29

## 2023-06-24 RX ORDER — OXYCODONE HYDROCHLORIDE AND ACETAMINOPHEN 5; 325 MG/1; MG/1
1 TABLET ORAL EVERY 6 HOURS PRN
Qty: 12 TABLET | Refills: 0 | Status: SHIPPED | OUTPATIENT
Start: 2023-06-24 | End: 2023-06-27

## 2023-06-24 RX ORDER — MORPHINE SULFATE 4 MG/ML
4 INJECTION, SOLUTION INTRAMUSCULAR; INTRAVENOUS ONCE
Status: COMPLETED | OUTPATIENT
Start: 2023-06-24 | End: 2023-06-24

## 2023-06-24 RX ORDER — TIZANIDINE 4 MG/1
4 TABLET ORAL EVERY 6 HOURS PRN
Qty: 20 TABLET | Refills: 0 | Status: SHIPPED | OUTPATIENT
Start: 2023-06-24 | End: 2023-06-29

## 2023-06-24 RX ADMIN — TIZANIDINE 4 MG: 4 TABLET ORAL at 15:56

## 2023-06-24 RX ADMIN — MORPHINE SULFATE 4 MG: 4 INJECTION, SOLUTION INTRAMUSCULAR; INTRAVENOUS at 15:56

## 2023-06-24 NOTE — ED PROVIDER NOTES
services described in the documentation,reviewed and edited the documentation which was dictated to the scribe in my presence, and it accurately records my words and actions.     Corey Pham, BON 06/25/23 6:32 AM    Valarie Pham, APRN - CNP          Sansan, APRN - CNP  06/25/23 7115

## 2023-06-26 ENCOUNTER — TELEPHONE (OUTPATIENT)
Dept: FAMILY MEDICINE CLINIC | Age: 58
End: 2023-06-26

## 2023-06-26 RX ORDER — METOPROLOL SUCCINATE 100 MG/1
100 TABLET, EXTENDED RELEASE ORAL DAILY
Qty: 90 TABLET | Refills: 2 | Status: SHIPPED | OUTPATIENT
Start: 2023-06-26

## 2023-06-26 RX ORDER — BUMETANIDE 2 MG/1
2 TABLET ORAL DAILY
Qty: 90 TABLET | Refills: 2 | Status: SHIPPED | OUTPATIENT
Start: 2023-06-26

## 2023-06-29 ENCOUNTER — OFFICE VISIT (OUTPATIENT)
Dept: FAMILY MEDICINE CLINIC | Age: 58
End: 2023-06-29
Payer: COMMERCIAL

## 2023-06-29 DIAGNOSIS — R29.898 LEG WEAKNESS, BILATERAL: ICD-10-CM

## 2023-06-29 DIAGNOSIS — I50.22 CHRONIC HFREF (HEART FAILURE WITH REDUCED EJECTION FRACTION) (HCC): ICD-10-CM

## 2023-06-29 DIAGNOSIS — D33.4 SCHWANNOMA OF SPINAL CORD (HCC): Primary | ICD-10-CM

## 2023-06-29 DIAGNOSIS — M25.562 LEFT LATERAL KNEE PAIN: ICD-10-CM

## 2023-06-29 DIAGNOSIS — N18.4 CKD (CHRONIC KIDNEY DISEASE) STAGE 4, GFR 15-29 ML/MIN (HCC): ICD-10-CM

## 2023-06-29 DIAGNOSIS — I48.21 PERMANENT ATRIAL FIBRILLATION (HCC): ICD-10-CM

## 2023-06-29 DIAGNOSIS — M48.062 SPINAL STENOSIS OF LUMBAR REGION WITH NEUROGENIC CLAUDICATION: ICD-10-CM

## 2023-06-29 DIAGNOSIS — I25.709 CORONARY ARTERY DISEASE INVOLVING CORONARY BYPASS GRAFT OF NATIVE HEART WITH ANGINA PECTORIS (HCC): ICD-10-CM

## 2023-06-29 PROCEDURE — 3074F SYST BP LT 130 MM HG: CPT | Performed by: NURSE PRACTITIONER

## 2023-06-29 PROCEDURE — 99214 OFFICE O/P EST MOD 30 MIN: CPT | Performed by: NURSE PRACTITIONER

## 2023-06-29 PROCEDURE — 3078F DIAST BP <80 MM HG: CPT | Performed by: NURSE PRACTITIONER

## 2023-06-29 RX ORDER — HYDROCODONE BITARTRATE AND ACETAMINOPHEN 5; 325 MG/1; MG/1
1 TABLET ORAL EVERY 8 HOURS PRN
Qty: 12 TABLET | Refills: 0 | Status: SHIPPED | OUTPATIENT
Start: 2023-06-29 | End: 2023-07-03

## 2023-06-29 ASSESSMENT — ENCOUNTER SYMPTOMS
SHORTNESS OF BREATH: 0
NAUSEA: 0
ABDOMINAL PAIN: 0
COUGH: 0

## 2023-06-30 ENCOUNTER — HOSPITAL ENCOUNTER (OUTPATIENT)
Age: 58
Discharge: HOME | End: 2023-06-30
Payer: COMMERCIAL

## 2023-06-30 ENCOUNTER — HOSPITAL ENCOUNTER (OUTPATIENT)
Dept: GENERAL RADIOLOGY | Age: 58
Discharge: HOME | End: 2023-06-30
Payer: COMMERCIAL

## 2023-06-30 VITALS
DIASTOLIC BLOOD PRESSURE: 82 MMHG | HEART RATE: 64 BPM | SYSTOLIC BLOOD PRESSURE: 138 MMHG | HEIGHT: 74 IN | RESPIRATION RATE: 16 BRPM | WEIGHT: 279 LBS | BODY MASS INDEX: 35.81 KG/M2

## 2023-06-30 DIAGNOSIS — M25.562 LEFT LATERAL KNEE PAIN: ICD-10-CM

## 2023-06-30 PROCEDURE — 73562 X-RAY EXAM OF KNEE 3: CPT

## 2023-06-30 ASSESSMENT — ENCOUNTER SYMPTOMS: BACK PAIN: 1

## 2023-07-03 DIAGNOSIS — M25.562 CHRONIC PAIN OF LEFT KNEE: Primary | ICD-10-CM

## 2023-07-03 DIAGNOSIS — G89.29 CHRONIC PAIN OF LEFT KNEE: Primary | ICD-10-CM

## 2023-08-03 ENCOUNTER — TELEPHONE (OUTPATIENT)
Dept: FAMILY MEDICINE CLINIC | Age: 58
End: 2023-08-03

## 2023-08-03 NOTE — TELEPHONE ENCOUNTER
Daughter Rudy Buckley on HIPAA/POA called office stating she has some concerns with pt. Says he is a recovering alcoholic and drug addict. She feels he is on something and is acting looney. She ran into him last night at [facility] think he was on something\". Pt is always complaining about really bad leg pain, daughter thinks it's from him smoking 2packs of cigarettes a day. She asked if pt was prescribed any medication at his last appt. Advised at last visit on 6/29/23 pt was given 12 tablets of Norco with NO refills. Pt was advised to follow up with his specialists.  Daughter is going to make sure he scheduled with his Neurologist.

## 2023-08-07 ENCOUNTER — APPOINTMENT (OUTPATIENT)
Dept: MRI IMAGING | Age: 58
DRG: 551 | End: 2023-08-07
Payer: COMMERCIAL

## 2023-08-07 ENCOUNTER — HOSPITAL ENCOUNTER (INPATIENT)
Age: 58
LOS: 7 days | Discharge: HOME | DRG: 551 | End: 2023-08-16
Attending: EMERGENCY MEDICINE | Admitting: INTERNAL MEDICINE
Payer: COMMERCIAL

## 2023-08-07 ENCOUNTER — APPOINTMENT (OUTPATIENT)
Dept: CT IMAGING | Age: 58
DRG: 551 | End: 2023-08-07
Payer: COMMERCIAL

## 2023-08-07 DIAGNOSIS — F10.930 ALCOHOL WITHDRAWAL SYNDROME WITHOUT COMPLICATION (HCC): ICD-10-CM

## 2023-08-07 DIAGNOSIS — I10 ESSENTIAL HYPERTENSION: ICD-10-CM

## 2023-08-07 DIAGNOSIS — G89.29 ACUTE EXACERBATION OF CHRONIC LOW BACK PAIN: Primary | ICD-10-CM

## 2023-08-07 DIAGNOSIS — M54.50 ACUTE EXACERBATION OF CHRONIC LOW BACK PAIN: Primary | ICD-10-CM

## 2023-08-07 DIAGNOSIS — E11.22 CONTROLLED TYPE 2 DIABETES MELLITUS WITH CHRONIC KIDNEY DISEASE, WITHOUT LONG-TERM CURRENT USE OF INSULIN, UNSPECIFIED CKD STAGE (HCC): ICD-10-CM

## 2023-08-07 DIAGNOSIS — N17.9 AKI (ACUTE KIDNEY INJURY) (HCC): ICD-10-CM
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
## 2023-08-07 DIAGNOSIS — M48.061 SPINAL STENOSIS OF LUMBAR REGION, UNSPECIFIED WHETHER NEUROGENIC CLAUDICATION PRESENT: ICD-10-CM

## 2023-08-07 PROBLEM — I16.0 HYPERTENSIVE URGENCY: Status: ACTIVE | Noted: 2023-08-07

## 2023-08-07 LAB
ALBUMIN SERPL BCG-MCNC: 3.3 G/DL (ref 3.5–5.1)
ALP SERPL-CCNC: 95 U/L (ref 38–126)
ALT SERPL W/O P-5'-P-CCNC: 26 U/L (ref 11–66)
AMPHETAMINES UR QL SCN: NEGATIVE
ANION GAP SERPL CALC-SCNC: 15 MEQ/L (ref 8–16)
AST SERPL-CCNC: 45 U/L (ref 5–40)
BACTERIA URNS QL MICRO: ABNORMAL /HPF
BARBITURATES UR QL SCN: NEGATIVE
BASOPHILS ABSOLUTE: 0.1 THOU/MM3 (ref 0–0.1)
BASOPHILS NFR BLD AUTO: 0.7 %
BENZODIAZ UR QL SCN: NEGATIVE
BILIRUB SERPL-MCNC: 0.5 MG/DL (ref 0.3–1.2)
BILIRUB UR QL STRIP.AUTO: NEGATIVE
BUN SERPL-MCNC: 46 MG/DL (ref 7–22)
BZE UR QL SCN: POSITIVE
CA-I BLD ISE-SCNC: 1.05 MMOL/L (ref 1.12–1.32)
CALCIUM SERPL-MCNC: 7.8 MG/DL (ref 8.5–10.5)
CANNABINOIDS UR QL SCN: NEGATIVE
CASTS #/AREA URNS LPF: ABNORMAL /LPF
CASTS 2: ABNORMAL /LPF
CHARACTER UR: CLEAR
CHLORIDE SERPL-SCNC: 112 MEQ/L (ref 98–111)
CK SERPL-CCNC: 201 U/L (ref 55–170)
CO2 SERPL-SCNC: 17 MEQ/L (ref 23–33)
COLOR: YELLOW
CREAT SERPL-MCNC: 3 MG/DL (ref 0.4–1.2)
CRYSTALS URNS MICRO: ABNORMAL
DEPRECATED RDW RBC AUTO: 53.2 FL (ref 35–45)
EOSINOPHIL NFR BLD AUTO: 3 %
EOSINOPHILS ABSOLUTE: 0.2 THOU/MM3 (ref 0–0.4)
EPITHELIAL CELLS, UA: ABNORMAL /HPF
ERYTHROCYTE [DISTWIDTH] IN BLOOD BY AUTOMATED COUNT: 14.2 % (ref 11.5–14.5)
ETHANOL SERPL-MCNC: 0.1 %
FENTANYL: NEGATIVE
GFR SERPL CREATININE-BSD FRML MDRD: 23 ML/MIN/1.73M2
GLUCOSE BLD STRIP.AUTO-MCNC: 206 MG/DL (ref 70–108)
GLUCOSE SERPL-MCNC: 165 MG/DL (ref 70–108)
GLUCOSE UR QL STRIP.AUTO: 250 MG/DL
HCT VFR BLD AUTO: 30.6 % (ref 42–52)
HGB BLD-MCNC: 9.8 GM/DL (ref 14–18)
HGB UR QL STRIP.AUTO: ABNORMAL
IMM GRANULOCYTES # BLD AUTO: 0.03 THOU/MM3 (ref 0–0.07)
IMM GRANULOCYTES NFR BLD AUTO: 0.4 %
INR PPP: 0.97 (ref 0.85–1.13)
KETONES UR QL STRIP.AUTO: NEGATIVE
LIPASE SERPL-CCNC: 68.6 U/L (ref 5.6–51.3)
LYMPHOCYTES ABSOLUTE: 0.6 THOU/MM3 (ref 1–4.8)
LYMPHOCYTES NFR BLD AUTO: 8.5 %
MAGNESIUM SERPL-MCNC: 2 MG/DL (ref 1.6–2.4)
MCH RBC QN AUTO: 33.6 PG (ref 26–33)
MCHC RBC AUTO-ENTMCNC: 32 GM/DL (ref 32.2–35.5)
MCV RBC AUTO: 104.8 FL (ref 80–94)
MISCELLANEOUS 2: ABNORMAL
MONOCYTES ABSOLUTE: 0.6 THOU/MM3 (ref 0.4–1.3)
MONOCYTES NFR BLD AUTO: 8.1 %
NEUTROPHILS NFR BLD AUTO: 79.3 %
NITRITE UR QL STRIP: NEGATIVE
NRBC BLD AUTO-RTO: 0 /100 WBC
OPIATES UR QL SCN: POSITIVE
OSMOLALITY SERPL CALC.SUM OF ELEC: 302.4 MOSMOL/KG (ref 275–300)
OXYCODONE, OPI5M: POSITIVE
PCP UR QL SCN: NEGATIVE
PH UR STRIP.AUTO: 5.5 [PH] (ref 5–9)
PLATELET # BLD AUTO: 112 THOU/MM3 (ref 130–400)
PMV BLD AUTO: 9.9 FL (ref 9.4–12.4)
POTASSIUM SERPL-SCNC: 4.7 MEQ/L (ref 3.5–5.2)
PROT SERPL-MCNC: 5.6 G/DL (ref 6.1–8)
PROT UR STRIP.AUTO-MCNC: 300 MG/DL
RBC # BLD AUTO: 2.92 MILL/MM3 (ref 4.7–6.1)
RBC URINE: ABNORMAL /HPF
RENAL EPI CELLS #/AREA URNS HPF: ABNORMAL /[HPF]
SEGMENTED NEUTROPHILS ABSOLUTE COUNT: 5.8 THOU/MM3 (ref 1.8–7.7)
SODIUM SERPL-SCNC: 144 MEQ/L (ref 135–145)
SP GR UR REFRACT.AUTO: 1.01 (ref 1–1.03)
UROBILINOGEN, URINE: 0.2 EU/DL (ref 0–1)
WBC # BLD AUTO: 7.3 THOU/MM3 (ref 4.8–10.8)
WBC #/AREA URNS HPF: ABNORMAL /HPF
WBC #/AREA URNS HPF: NEGATIVE /[HPF]
YEAST LIKE FUNGI URNS QL MICRO: ABNORMAL

## 2023-08-07 PROCEDURE — 96374 THER/PROPH/DIAG INJ IV PUSH: CPT

## 2023-08-07 PROCEDURE — 70450 CT HEAD/BRAIN W/O DYE: CPT

## 2023-08-07 PROCEDURE — 96375 TX/PRO/DX INJ NEW DRUG ADDON: CPT

## 2023-08-07 PROCEDURE — 2580000003 HC RX 258: Performed by: EMERGENCY MEDICINE

## 2023-08-07 PROCEDURE — 80307 DRUG TEST PRSMV CHEM ANLYZR: CPT

## 2023-08-07 PROCEDURE — 99223 1ST HOSP IP/OBS HIGH 75: CPT

## 2023-08-07 PROCEDURE — 96376 TX/PRO/DX INJ SAME DRUG ADON: CPT

## 2023-08-07 PROCEDURE — 81001 URINALYSIS AUTO W/SCOPE: CPT

## 2023-08-07 PROCEDURE — 2500000003 HC RX 250 WO HCPCS: Performed by: EMERGENCY MEDICINE

## 2023-08-07 PROCEDURE — 83690 ASSAY OF LIPASE: CPT

## 2023-08-07 PROCEDURE — 85610 PROTHROMBIN TIME: CPT

## 2023-08-07 PROCEDURE — 72146 MRI CHEST SPINE W/O DYE: CPT

## 2023-08-07 PROCEDURE — 82330 ASSAY OF CALCIUM: CPT

## 2023-08-07 PROCEDURE — 82948 REAGENT STRIP/BLOOD GLUCOSE: CPT

## 2023-08-07 PROCEDURE — 93005 ELECTROCARDIOGRAM TRACING: CPT

## 2023-08-07 PROCEDURE — 85025 COMPLETE CBC W/AUTO DIFF WBC: CPT

## 2023-08-07 PROCEDURE — 72148 MRI LUMBAR SPINE W/O DYE: CPT

## 2023-08-07 PROCEDURE — 36415 COLL VENOUS BLD VENIPUNCTURE: CPT

## 2023-08-07 PROCEDURE — 6360000002 HC RX W HCPCS: Performed by: EMERGENCY MEDICINE

## 2023-08-07 PROCEDURE — 72125 CT NECK SPINE W/O DYE: CPT

## 2023-08-07 PROCEDURE — 99285 EMERGENCY DEPT VISIT HI MDM: CPT

## 2023-08-07 PROCEDURE — G0378 HOSPITAL OBSERVATION PER HR: HCPCS

## 2023-08-07 PROCEDURE — 6370000000 HC RX 637 (ALT 250 FOR IP)

## 2023-08-07 PROCEDURE — 2580000003 HC RX 258

## 2023-08-07 PROCEDURE — 82550 ASSAY OF CK (CPK): CPT

## 2023-08-07 PROCEDURE — 83735 ASSAY OF MAGNESIUM: CPT

## 2023-08-07 PROCEDURE — 6360000002 HC RX W HCPCS

## 2023-08-07 PROCEDURE — 80053 COMPREHEN METABOLIC PANEL: CPT

## 2023-08-07 PROCEDURE — 82077 ASSAY SPEC XCP UR&BREATH IA: CPT

## 2023-08-07 RX ORDER — LORAZEPAM 2 MG/ML
2 INJECTION INTRAMUSCULAR
Status: DISCONTINUED | OUTPATIENT
Start: 2023-08-07 | End: 2023-08-07

## 2023-08-07 RX ORDER — SODIUM CHLORIDE 0.9 % (FLUSH) 0.9 %
5-40 SYRINGE (ML) INJECTION EVERY 12 HOURS SCHEDULED
Status: DISCONTINUED | OUTPATIENT
Start: 2023-08-07 | End: 2023-08-08 | Stop reason: SDUPTHER

## 2023-08-07 RX ORDER — POLYETHYLENE GLYCOL 3350 17 G/17G
17 POWDER, FOR SOLUTION ORAL DAILY PRN
Status: DISCONTINUED | OUTPATIENT
Start: 2023-08-07 | End: 2023-08-16 | Stop reason: HOSPADM

## 2023-08-07 RX ORDER — MAGNESIUM SULFATE IN WATER 40 MG/ML
2000 INJECTION, SOLUTION INTRAVENOUS PRN
Status: DISCONTINUED | OUTPATIENT
Start: 2023-08-07 | End: 2023-08-16 | Stop reason: HOSPADM

## 2023-08-07 RX ORDER — MEXILETINE HYDROCHLORIDE 150 MG/1
300 CAPSULE ORAL 3 TIMES DAILY
Status: DISCONTINUED | OUTPATIENT
Start: 2023-08-07 | End: 2023-08-16 | Stop reason: HOSPADM

## 2023-08-07 RX ORDER — SODIUM CHLORIDE 0.9 % (FLUSH) 0.9 %
5-40 SYRINGE (ML) INJECTION PRN
Status: DISCONTINUED | OUTPATIENT
Start: 2023-08-07 | End: 2023-08-08 | Stop reason: SDUPTHER

## 2023-08-07 RX ORDER — POTASSIUM CHLORIDE 20 MEQ/1
40 TABLET, EXTENDED RELEASE ORAL PRN
Status: DISCONTINUED | OUTPATIENT
Start: 2023-08-07 | End: 2023-08-16 | Stop reason: HOSPADM

## 2023-08-07 RX ORDER — LORAZEPAM 2 MG/ML
1 INJECTION INTRAMUSCULAR
Status: DISCONTINUED | OUTPATIENT
Start: 2023-08-07 | End: 2023-08-07

## 2023-08-07 RX ORDER — MULTIVITAMIN WITH IRON
1 TABLET ORAL DAILY
Status: DISCONTINUED | OUTPATIENT
Start: 2023-08-07 | End: 2023-08-16 | Stop reason: HOSPADM

## 2023-08-07 RX ORDER — PHENOBARBITAL SODIUM 65 MG/ML
260 INJECTION INTRAMUSCULAR
Status: DISCONTINUED | OUTPATIENT
Start: 2023-08-07 | End: 2023-08-08

## 2023-08-07 RX ORDER — AMIODARONE HYDROCHLORIDE 200 MG/1
200 TABLET ORAL DAILY
Status: DISCONTINUED | OUTPATIENT
Start: 2023-08-07 | End: 2023-08-16 | Stop reason: HOSPADM

## 2023-08-07 RX ORDER — LORAZEPAM 2 MG/ML
4 INJECTION INTRAMUSCULAR
Status: DISCONTINUED | OUTPATIENT
Start: 2023-08-07 | End: 2023-08-07

## 2023-08-07 RX ORDER — LORAZEPAM 2 MG/ML
3 INJECTION INTRAMUSCULAR
Status: DISCONTINUED | OUTPATIENT
Start: 2023-08-07 | End: 2023-08-07

## 2023-08-07 RX ORDER — LANOLIN ALCOHOL/MO/W.PET/CERES
100 CREAM (GRAM) TOPICAL DAILY
Status: DISCONTINUED | OUTPATIENT
Start: 2023-08-07 | End: 2023-08-16 | Stop reason: HOSPADM

## 2023-08-07 RX ORDER — DEXAMETHASONE SODIUM PHOSPHATE 4 MG/ML
8 INJECTION, SOLUTION INTRA-ARTICULAR; INTRALESIONAL; INTRAMUSCULAR; INTRAVENOUS; SOFT TISSUE EVERY 8 HOURS
Status: COMPLETED | OUTPATIENT
Start: 2023-08-07 | End: 2023-08-08

## 2023-08-07 RX ORDER — LORAZEPAM 1 MG/1
1 TABLET ORAL
Status: DISCONTINUED | OUTPATIENT
Start: 2023-08-07 | End: 2023-08-07

## 2023-08-07 RX ORDER — GABAPENTIN 100 MG/1
100 CAPSULE ORAL EVERY 8 HOURS SCHEDULED
Status: DISCONTINUED | OUTPATIENT
Start: 2023-08-07 | End: 2023-08-16 | Stop reason: HOSPADM

## 2023-08-07 RX ORDER — DEXTROSE MONOHYDRATE 100 MG/ML
INJECTION, SOLUTION INTRAVENOUS CONTINUOUS PRN
Status: DISCONTINUED | OUTPATIENT
Start: 2023-08-07 | End: 2023-08-16 | Stop reason: HOSPADM

## 2023-08-07 RX ORDER — ONDANSETRON 2 MG/ML
4 INJECTION INTRAMUSCULAR; INTRAVENOUS ONCE
Status: COMPLETED | OUTPATIENT
Start: 2023-08-07 | End: 2023-08-07

## 2023-08-07 RX ORDER — INSULIN GLARGINE 100 [IU]/ML
45 INJECTION, SOLUTION SUBCUTANEOUS NIGHTLY
Status: DISCONTINUED | OUTPATIENT
Start: 2023-08-07 | End: 2023-08-16 | Stop reason: HOSPADM

## 2023-08-07 RX ORDER — ONDANSETRON 2 MG/ML
INJECTION INTRAMUSCULAR; INTRAVENOUS
Status: DISCONTINUED
Start: 2023-08-07 | End: 2023-08-07

## 2023-08-07 RX ORDER — INSULIN LISPRO 100 [IU]/ML
0-4 INJECTION, SOLUTION INTRAVENOUS; SUBCUTANEOUS
Status: DISCONTINUED | OUTPATIENT
Start: 2023-08-07 | End: 2023-08-08

## 2023-08-07 RX ORDER — NICOTINE 21 MG/24HR
1 PATCH, TRANSDERMAL 24 HOURS TRANSDERMAL DAILY
Status: DISCONTINUED | OUTPATIENT
Start: 2023-08-08 | End: 2023-08-16 | Stop reason: HOSPADM

## 2023-08-07 RX ORDER — ONDANSETRON 2 MG/ML
4 INJECTION INTRAMUSCULAR; INTRAVENOUS EVERY 6 HOURS PRN
Status: DISCONTINUED | OUTPATIENT
Start: 2023-08-07 | End: 2023-08-16 | Stop reason: HOSPADM

## 2023-08-07 RX ORDER — SODIUM CHLORIDE 9 MG/ML
INJECTION, SOLUTION INTRAVENOUS PRN
Status: DISCONTINUED | OUTPATIENT
Start: 2023-08-07 | End: 2023-08-16 | Stop reason: HOSPADM

## 2023-08-07 RX ORDER — HYDRALAZINE HYDROCHLORIDE 20 MG/ML
5 INJECTION INTRAMUSCULAR; INTRAVENOUS ONCE
Status: COMPLETED | OUTPATIENT
Start: 2023-08-07 | End: 2023-08-07

## 2023-08-07 RX ORDER — PHENOBARBITAL SODIUM 65 MG/ML
130 INJECTION INTRAMUSCULAR
Status: DISCONTINUED | OUTPATIENT
Start: 2023-08-07 | End: 2023-08-08

## 2023-08-07 RX ORDER — INSULIN LISPRO 100 [IU]/ML
0-4 INJECTION, SOLUTION INTRAVENOUS; SUBCUTANEOUS NIGHTLY
Status: DISCONTINUED | OUTPATIENT
Start: 2023-08-07 | End: 2023-08-08

## 2023-08-07 RX ORDER — ACETAMINOPHEN 325 MG/1
650 TABLET ORAL EVERY 6 HOURS PRN
Status: DISCONTINUED | OUTPATIENT
Start: 2023-08-07 | End: 2023-08-16 | Stop reason: HOSPADM

## 2023-08-07 RX ORDER — LORAZEPAM 1 MG/1
3 TABLET ORAL
Status: DISCONTINUED | OUTPATIENT
Start: 2023-08-07 | End: 2023-08-07

## 2023-08-07 RX ORDER — LORAZEPAM 1 MG/1
4 TABLET ORAL
Status: DISCONTINUED | OUTPATIENT
Start: 2023-08-07 | End: 2023-08-07

## 2023-08-07 RX ORDER — WARFARIN SODIUM 7.5 MG/1
7.5 TABLET ORAL
Status: COMPLETED | OUTPATIENT
Start: 2023-08-07 | End: 2023-08-07

## 2023-08-07 RX ORDER — OXYCODONE HYDROCHLORIDE 5 MG/1
10 TABLET ORAL EVERY 4 HOURS PRN
Status: DISCONTINUED | OUTPATIENT
Start: 2023-08-07 | End: 2023-08-11

## 2023-08-07 RX ORDER — IBUPROFEN 600 MG/1
1 TABLET ORAL PRN
Status: DISCONTINUED | OUTPATIENT
Start: 2023-08-07 | End: 2023-08-16 | Stop reason: HOSPADM

## 2023-08-07 RX ORDER — POTASSIUM CHLORIDE 20 MEQ/1
20 TABLET, EXTENDED RELEASE ORAL DAILY
Status: DISCONTINUED | OUTPATIENT
Start: 2023-08-07 | End: 2023-08-16 | Stop reason: HOSPADM

## 2023-08-07 RX ORDER — ONDANSETRON 4 MG/1
4 TABLET, ORALLY DISINTEGRATING ORAL EVERY 8 HOURS PRN
Status: DISCONTINUED | OUTPATIENT
Start: 2023-08-07 | End: 2023-08-16 | Stop reason: HOSPADM

## 2023-08-07 RX ORDER — LIDOCAINE 4 G/G
1 PATCH TOPICAL DAILY
Status: DISCONTINUED | OUTPATIENT
Start: 2023-08-07 | End: 2023-08-08

## 2023-08-07 RX ORDER — POTASSIUM CHLORIDE 7.45 MG/ML
10 INJECTION INTRAVENOUS PRN
Status: DISCONTINUED | OUTPATIENT
Start: 2023-08-07 | End: 2023-08-16 | Stop reason: HOSPADM

## 2023-08-07 RX ORDER — LORAZEPAM 1 MG/1
2 TABLET ORAL
Status: DISCONTINUED | OUTPATIENT
Start: 2023-08-07 | End: 2023-08-07

## 2023-08-07 RX ORDER — AMLODIPINE BESYLATE 5 MG/1
5 TABLET ORAL 2 TIMES DAILY
COMMUNITY

## 2023-08-07 RX ORDER — SODIUM CHLORIDE 9 MG/ML
INJECTION, SOLUTION INTRAVENOUS PRN
Status: DISCONTINUED | OUTPATIENT
Start: 2023-08-07 | End: 2023-08-08 | Stop reason: SDUPTHER

## 2023-08-07 RX ORDER — DOXAZOSIN 2 MG/1
2 TABLET ORAL DAILY
Status: DISCONTINUED | OUTPATIENT
Start: 2023-08-07 | End: 2023-08-16 | Stop reason: HOSPADM

## 2023-08-07 RX ORDER — OXYCODONE HYDROCHLORIDE 5 MG/1
5 TABLET ORAL EVERY 4 HOURS PRN
Status: DISCONTINUED | OUTPATIENT
Start: 2023-08-07 | End: 2023-08-11

## 2023-08-07 RX ORDER — SODIUM CHLORIDE 0.9 % (FLUSH) 0.9 %
5-40 SYRINGE (ML) INJECTION PRN
Status: DISCONTINUED | OUTPATIENT
Start: 2023-08-07 | End: 2023-08-16 | Stop reason: HOSPADM

## 2023-08-07 RX ORDER — BUMETANIDE 1 MG/1
2 TABLET ORAL DAILY
Status: DISCONTINUED | OUTPATIENT
Start: 2023-08-07 | End: 2023-08-16 | Stop reason: HOSPADM

## 2023-08-07 RX ORDER — LOSARTAN POTASSIUM 50 MG/1
50 TABLET ORAL DAILY
Status: DISCONTINUED | OUTPATIENT
Start: 2023-08-07 | End: 2023-08-16 | Stop reason: HOSPADM

## 2023-08-07 RX ORDER — ACETAMINOPHEN 650 MG/1
650 SUPPOSITORY RECTAL EVERY 6 HOURS PRN
Status: DISCONTINUED | OUTPATIENT
Start: 2023-08-07 | End: 2023-08-16 | Stop reason: HOSPADM

## 2023-08-07 RX ORDER — HYDRALAZINE HYDROCHLORIDE 10 MG/1
10 TABLET, FILM COATED ORAL EVERY 8 HOURS SCHEDULED
Status: DISCONTINUED | OUTPATIENT
Start: 2023-08-07 | End: 2023-08-08

## 2023-08-07 RX ORDER — SODIUM CHLORIDE 0.9 % (FLUSH) 0.9 %
5-40 SYRINGE (ML) INJECTION EVERY 12 HOURS SCHEDULED
Status: DISCONTINUED | OUTPATIENT
Start: 2023-08-07 | End: 2023-08-16 | Stop reason: HOSPADM

## 2023-08-07 RX ORDER — FOLIC ACID 1 MG/1
1 TABLET ORAL DAILY
Status: DISCONTINUED | OUTPATIENT
Start: 2023-08-07 | End: 2023-08-16 | Stop reason: HOSPADM

## 2023-08-07 RX ORDER — ALOGLIPTIN 12.5 MG/1
12.5 TABLET, FILM COATED ORAL DAILY
Status: DISCONTINUED | OUTPATIENT
Start: 2023-08-07 | End: 2023-08-16 | Stop reason: HOSPADM

## 2023-08-07 RX ORDER — ALPRAZOLAM 0.5 MG/1
0.5 TABLET ORAL NIGHTLY PRN
Status: DISCONTINUED | OUTPATIENT
Start: 2023-08-07 | End: 2023-08-16 | Stop reason: HOSPADM

## 2023-08-07 RX ORDER — ISOSORBIDE MONONITRATE 60 MG/1
120 TABLET, EXTENDED RELEASE ORAL DAILY
Status: DISCONTINUED | OUTPATIENT
Start: 2023-08-07 | End: 2023-08-16 | Stop reason: HOSPADM

## 2023-08-07 RX ORDER — MORPHINE SULFATE 4 MG/ML
4 INJECTION, SOLUTION INTRAMUSCULAR; INTRAVENOUS
Status: COMPLETED | OUTPATIENT
Start: 2023-08-07 | End: 2023-08-07

## 2023-08-07 RX ORDER — METOPROLOL SUCCINATE 100 MG/1
100 TABLET, EXTENDED RELEASE ORAL DAILY
Status: DISCONTINUED | OUTPATIENT
Start: 2023-08-07 | End: 2023-08-16 | Stop reason: HOSPADM

## 2023-08-07 RX ADMIN — METOPROLOL SUCCINATE 100 MG: 100 TABLET, EXTENDED RELEASE ORAL at 15:05

## 2023-08-07 RX ADMIN — Medication 1 TABLET: at 21:39

## 2023-08-07 RX ADMIN — WARFARIN SODIUM 7.5 MG: 7.5 TABLET ORAL at 21:37

## 2023-08-07 RX ADMIN — LORAZEPAM 1 MG: 2 INJECTION INTRAMUSCULAR; INTRAVENOUS at 13:28

## 2023-08-07 RX ADMIN — INSULIN GLARGINE 45 UNITS: 100 INJECTION, SOLUTION SUBCUTANEOUS at 21:39

## 2023-08-07 RX ADMIN — SODIUM CHLORIDE, PRESERVATIVE FREE 10 ML: 5 INJECTION INTRAVENOUS at 21:37

## 2023-08-07 RX ADMIN — TICAGRELOR 90 MG: 90 TABLET ORAL at 21:37

## 2023-08-07 RX ADMIN — Medication 100 MG: at 21:38

## 2023-08-07 RX ADMIN — ONDANSETRON 4 MG: 2 INJECTION INTRAMUSCULAR; INTRAVENOUS at 14:20

## 2023-08-07 RX ADMIN — ALOGLIPTIN 12.5 MG: 12.5 TABLET, FILM COATED ORAL at 21:38

## 2023-08-07 RX ADMIN — FOLIC ACID 1 MG: 1 TABLET ORAL at 21:39

## 2023-08-07 RX ADMIN — THIAMINE HYDROCHLORIDE: 100 INJECTION, SOLUTION INTRAMUSCULAR; INTRAVENOUS at 12:38

## 2023-08-07 RX ADMIN — PHENOBARBITAL SODIUM 130 MG: 65 INJECTION INTRAMUSCULAR at 15:21

## 2023-08-07 RX ADMIN — HYDRALAZINE HYDROCHLORIDE 5 MG: 20 INJECTION, SOLUTION INTRAMUSCULAR; INTRAVENOUS at 16:09

## 2023-08-07 RX ADMIN — LORAZEPAM 3 MG: 2 INJECTION INTRAMUSCULAR; INTRAVENOUS at 11:33

## 2023-08-07 RX ADMIN — PHENOBARBITAL SODIUM 130 MG: 65 INJECTION INTRAMUSCULAR at 19:29

## 2023-08-07 RX ADMIN — SODIUM CHLORIDE, PRESERVATIVE FREE 10 ML: 5 INJECTION INTRAVENOUS at 21:40

## 2023-08-07 RX ADMIN — ONDANSETRON 4 MG: 2 INJECTION INTRAMUSCULAR; INTRAVENOUS at 10:54

## 2023-08-07 RX ADMIN — AMIODARONE HYDROCHLORIDE 200 MG: 200 TABLET ORAL at 21:38

## 2023-08-07 RX ADMIN — OXYCODONE 10 MG: 5 TABLET ORAL at 23:01

## 2023-08-07 RX ADMIN — POTASSIUM CHLORIDE 20 MEQ: 1500 TABLET, EXTENDED RELEASE ORAL at 21:37

## 2023-08-07 RX ADMIN — MEXILETINE HYDROCHLORIDE 300 MG: 150 CAPSULE ORAL at 21:38

## 2023-08-07 RX ADMIN — DOXAZOSIN 2 MG: 2 TABLET ORAL at 15:06

## 2023-08-07 RX ADMIN — BUMETANIDE 2 MG: 1 TABLET ORAL at 15:06

## 2023-08-07 RX ADMIN — EMPAGLIFLOZIN 10 MG: 10 TABLET, FILM COATED ORAL at 21:39

## 2023-08-07 RX ADMIN — DEXAMETHASONE SODIUM PHOSPHATE 8 MG: 4 INJECTION, SOLUTION INTRA-ARTICULAR; INTRALESIONAL; INTRAMUSCULAR; INTRAVENOUS; SOFT TISSUE at 21:37

## 2023-08-07 RX ADMIN — MORPHINE SULFATE 4 MG: 4 INJECTION, SOLUTION INTRAMUSCULAR; INTRAVENOUS at 10:49

## 2023-08-07 RX ADMIN — OXYCODONE 10 MG: 5 TABLET ORAL at 16:07

## 2023-08-07 RX ADMIN — GABAPENTIN 100 MG: 100 CAPSULE ORAL at 21:38

## 2023-08-07 RX ADMIN — PHENOBARBITAL SODIUM 130 MG: 65 INJECTION INTRAMUSCULAR at 23:13

## 2023-08-07 RX ADMIN — ISOSORBIDE MONONITRATE 120 MG: 60 TABLET, EXTENDED RELEASE ORAL at 15:05

## 2023-08-07 ASSESSMENT — PAIN DESCRIPTION - LOCATION
LOCATION: BACK

## 2023-08-07 ASSESSMENT — PAIN DESCRIPTION - ORIENTATION
ORIENTATION: LOWER
ORIENTATION: LOWER;MID

## 2023-08-07 ASSESSMENT — PAIN - FUNCTIONAL ASSESSMENT
PAIN_FUNCTIONAL_ASSESSMENT: 0-10
PAIN_FUNCTIONAL_ASSESSMENT: 0-10

## 2023-08-07 ASSESSMENT — PAIN SCALES - GENERAL
PAINLEVEL_OUTOF10: 6
PAINLEVEL_OUTOF10: 8
PAINLEVEL_OUTOF10: 8

## 2023-08-07 ASSESSMENT — PAIN DESCRIPTION - DESCRIPTORS: DESCRIPTORS: SHARP;ACHING

## 2023-08-07 NOTE — ED NOTES
Patient transported to MRI at this time. Defibrillator rep and MRI tech de-activating defibrillator for MRI at this time. Patient appears relaxed. Respirations even and unlabored at this time.       Yaw Gan RN  08/07/23 0408

## 2023-08-07 NOTE — H&P
Hospitalist History & Physical    Patient:  Neris Schwarz    Unit/Bed:22/022A  YOB: 1965  MRN: [id number]   Acct: [de-identified]   PCP: ISABELLA Barcenas CNP  Code Status: Prior    Date of Service: Pt seen/examined on 08/07/23 and admitted to Observation with expected LOS less than two midnights due to medical therapy. Chief Complaint: back pain, fall    Assessment/Plan:    Hypertensive Urgency: Hold losartan. Continue Bumex. Continue hydralazine, metoprolol, and Imdur. Patient reports not being compliant with medications since he started drinking about 2 weeks ago. Resume all medication as able and continue to monitor. Intractable back Pain w/ known spinal stenosis and schwannoma: MRI thoracic spine shows small disc protrusions at T7-8 and T9-10 resulting in mild canal stenosis with no cord compression. MRI lumbar spine shows mild canal and moderate to ssevere bilateral foraminal stenosis at L3-4 and L4-L5, T12-L1, L2-3. Spondylolysis of the right side at L5. Small intradural defect behind L5-S1 larger than on remote study in 2018 representing an intradural schwannoma. Trial pain control with Decadron 8 mg every 8 hours x3 doses, lidocaine patch, gabapentin and oxycodone as needed-May need to consult pain management for further evaluation and possible epidural  Per ER, reviewed by orthospine who recommended outpatient follow-up and pain control    Alcohol Withdrawal: PAWSS score 7. Serum alcohol level on admission 0.10. Phenobarbital push panel protocol for symptoms. Seizure/fall precautions. Addiction/ consulted. Multivitamin, thiamine, and folic acid. Continue to monitor. BABAK on CKD Stage IV vs CKD progression: Creatinine 3.0 on admission, baseline 2.6-2.7. Concern for cardiorenal syndrome given patient's noncompliance with diuretic and volume overloaded appearance. Continue Bumex, holding losartan. UA pending. Avoid nephrotoxic agents as possible.

## 2023-08-07 NOTE — ED NOTES
Utilize [facility] alcohol withdrawal scale (Based on [place] Modified Alcohol Withdrawal Scale). Tabulate score based on classifications including Tremor, Sweating, Hallucination, Orientation, and Agitation. Tremor: 1  Sweatin  Hallucinations: 1  Orientation: 0  Agitation: 1  Total Score: 3  Action perform as described below     Tremor:  No tremor is 0 points. Tremor on movement is 1 point. Tremor at rest is 2 points. Sweating: No Sweat 0 points. Moist is 1 point. Drenching sweats is 2 points. Hallucinations: No present 0 points. Dissuadable is 1 point. Not dissuadable is 2 points. Orientation: Oriented 0 points. Vague/detached 1 point. Disoriented/no contact 2 points. Agitation: Calm 0 points. Anxious 1 point. Panicky 2 points. Check scale every 2 hours. Discontinue scoring with 4 consecutive scorings of 0. Scale 0: No phenobarbital given. Re-assess every 60 minutes as needed. Scale 1-3: Phenobarbital 130 mg IV over 3 minutes. Re-assess every 60 minutes as needed. May administer every 60 minutes to a maximum dose of phenobarbital 1040 mg in 24 hours! Scale 4-8: Phenobarbital 260 mg IV over 5 minutes. Re-assess every 60 minutes as needed. May administer every 60 minutes to a maximum dose of phenobarbital 1040mg in 24 hours! Scale 9-10: Transfer to ICU (if not already in ICU). Administer 10mg/kg phenobarbital IV over 60 minutes. Maximum dose phenobarbital is 1040mg in 24 hours!        Sapphire Mares RN  [phone]

## 2023-08-07 NOTE — ED NOTES
Warm blanket and ice water provided at this time. Patient verbalizes understanding in plan of care and admitting process.       Georgia Castillo RN  08/07/23 5209

## 2023-08-07 NOTE — ED NOTES
Patient resting in bed, respirations even and unlabored at this time. Patient family updated on plan of care at this time. Fluid bolus infusing.       Laura Sutherland RN  08/07/23 4284

## 2023-08-07 NOTE — FLOWSHEET NOTE
08/07/23 5658   Safe Environment   Safety Measures Other (comment)  (VN attempted admission at this time)     VN called into patients room and introduced myself and role. Patient answered and permitted video. Video activated. . Patient resting comfortably in bed. . Patient answering questions for VN but continuing to fall asleep between questions at this time. VN updated BSRN Lou of patient falling asleep. Will reattempt.

## 2023-08-07 NOTE — ED NOTES
Pt is currently stable will be transported to [facility] via wheelchair. /111. Spoke to charge nurse Macy prior to departure. Alcohol withdrawn scale to be found within notes. Nausea only stated concern at this moment by the patient.       Julianna Higuera  08/07/23 3804

## 2023-08-07 NOTE — ED NOTES
ED to inpatient nurses report    Chief Complaint   Patient presents with    Back Pain    Alcohol Problem      Present to ED from home  LOC: alert and orientated to name, place, date  Vital signs   Vitals:    08/07/23 1154 08/07/23 1211 08/07/23 1236 08/07/23 1316   BP:   (!) 196/87 (!) 192/86   Pulse: 87 82 82 87   Resp: 14 14 16 15   Temp:       TempSrc:       SpO2: 98% 93% 93% 91%   Weight:       Height:          Oxygen Baseline 98% room air    Current needs required none Bipap/Cpap No  LDAs:   Peripheral IV 08/07/23 Right Antecubital (Active)   Site Assessment Clean, dry & intact 08/07/23 1315   Line Status Infusing 08/07/23 1315     Mobility: Requires assistance * 1  Pending ED orders: none  Present condition: stable      C-SSRS Risk of Suicide: No Risk  Swallow Screening  Passed  Preferred Language: Ruslan     Electronically signed by Fabian Kenny RN on 8/7/2023 at 1:22 PM      Fabian Kenny RN  08/07/23 4278

## 2023-08-07 NOTE — ED NOTES
Patient returned from CT and MRI at this time. Patient respirations even and unlabored at this time. Patient reports back pain of 6/10 at this time. Family present at bedside.       Melinda Paul RN  08/07/23 1955

## 2023-08-07 NOTE — ED TRIAGE NOTES
Patient presents to ED a american twp from home with report of 8/10 lower back pain and leg pain. Patient reports a hx of sciatica, lumbar tumors, and several stents and cardiac problems. Patient reports difficulty walking at this time. Patient also states he started drinking again because of the back pain (1.5 pints of crown royal a day). Patient is currently experiencing mild shakiness and stating he has not drank anything today.

## 2023-08-07 NOTE — ED NOTES
Patient defibrillator reactivated at this time. Patient transported to CT at this time.       Montrell Chavez RN  08/07/23 2591
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
## 2023-08-08 LAB
ANION GAP SERPL CALC-SCNC: 15 MEQ/L (ref 8–16)
BASOPHILS ABSOLUTE: 0 THOU/MM3 (ref 0–0.1)
BASOPHILS NFR BLD AUTO: 0.3 %
BUN SERPL-MCNC: 45 MG/DL (ref 7–22)
CALCIUM SERPL-MCNC: 7.8 MG/DL (ref 8.5–10.5)
CHLORIDE SERPL-SCNC: 105 MEQ/L (ref 98–111)
CO2 SERPL-SCNC: 19 MEQ/L (ref 23–33)
CREAT SERPL-MCNC: 2.8 MG/DL (ref 0.4–1.2)
DEPRECATED RDW RBC AUTO: 53.9 FL (ref 35–45)
EOSINOPHIL NFR BLD AUTO: 0 %
EOSINOPHILS ABSOLUTE: 0 THOU/MM3 (ref 0–0.4)
ERYTHROCYTE [DISTWIDTH] IN BLOOD BY AUTOMATED COUNT: 14.1 % (ref 11.5–14.5)
GFR SERPL CREATININE-BSD FRML MDRD: 25 ML/MIN/1.73M2
GLUCOSE BLD STRIP.AUTO-MCNC: 205 MG/DL (ref 70–108)
GLUCOSE BLD STRIP.AUTO-MCNC: 228 MG/DL (ref 70–108)
GLUCOSE BLD STRIP.AUTO-MCNC: 269 MG/DL (ref 70–108)
GLUCOSE BLD STRIP.AUTO-MCNC: 287 MG/DL (ref 70–108)
GLUCOSE SERPL-MCNC: 254 MG/DL (ref 70–108)
HCT VFR BLD AUTO: 29.9 % (ref 42–52)
HGB BLD-MCNC: 9.7 GM/DL (ref 14–18)
IMM GRANULOCYTES # BLD AUTO: 0.03 THOU/MM3 (ref 0–0.07)
IMM GRANULOCYTES NFR BLD AUTO: 0.5 %
INR PPP: 0.95 (ref 0.85–1.13)
LYMPHOCYTES ABSOLUTE: 0.2 THOU/MM3 (ref 1–4.8)
LYMPHOCYTES NFR BLD AUTO: 2.5 %
MCH RBC QN AUTO: 34.5 PG (ref 26–33)
MCHC RBC AUTO-ENTMCNC: 32.4 GM/DL (ref 32.2–35.5)
MCV RBC AUTO: 106.4 FL (ref 80–94)
MONOCYTES ABSOLUTE: 0.1 THOU/MM3 (ref 0.4–1.3)
MONOCYTES NFR BLD AUTO: 1.3 %
NEUTROPHILS NFR BLD AUTO: 95.4 %
NRBC BLD AUTO-RTO: 0 /100 WBC
PLATELET # BLD AUTO: 97 THOU/MM3 (ref 130–400)
PLATELET BLD QL SMEAR: ABNORMAL
PMV BLD AUTO: 10.1 FL (ref 9.4–12.4)
POTASSIUM SERPL-SCNC: 5.1 MEQ/L (ref 3.5–5.2)
RBC # BLD AUTO: 2.81 MILL/MM3 (ref 4.7–6.1)
SCAN OF BLOOD SMEAR: NORMAL
SEGMENTED NEUTROPHILS ABSOLUTE COUNT: 5.7 THOU/MM3 (ref 1.8–7.7)
SODIUM SERPL-SCNC: 139 MEQ/L (ref 135–145)
WBC # BLD AUTO: 6 THOU/MM3 (ref 4.8–10.8)

## 2023-08-08 PROCEDURE — 2580000003 HC RX 258

## 2023-08-08 PROCEDURE — 6370000000 HC RX 637 (ALT 250 FOR IP)

## 2023-08-08 PROCEDURE — G0378 HOSPITAL OBSERVATION PER HR: HCPCS

## 2023-08-08 PROCEDURE — 96376 TX/PRO/DX INJ SAME DRUG ADON: CPT

## 2023-08-08 PROCEDURE — 6360000002 HC RX W HCPCS

## 2023-08-08 PROCEDURE — G0378 HOSPITAL OBSERVATION PER HR: HCPCS | Performed by: NURSE PRACTITIONER

## 2023-08-08 PROCEDURE — 85610 PROTHROMBIN TIME: CPT

## 2023-08-08 PROCEDURE — 6360000002 HC RX W HCPCS: Performed by: NURSE PRACTITIONER

## 2023-08-08 PROCEDURE — 80048 BASIC METABOLIC PNL TOTAL CA: CPT

## 2023-08-08 PROCEDURE — 6370000000 HC RX 637 (ALT 250 FOR IP): Performed by: NURSE PRACTITIONER

## 2023-08-08 PROCEDURE — 97530 THERAPEUTIC ACTIVITIES: CPT

## 2023-08-08 PROCEDURE — 97162 PT EVAL MOD COMPLEX 30 MIN: CPT

## 2023-08-08 PROCEDURE — 2580000003 HC RX 258: Performed by: NURSE PRACTITIONER

## 2023-08-08 PROCEDURE — 36415 COLL VENOUS BLD VENIPUNCTURE: CPT

## 2023-08-08 PROCEDURE — 93010 ELECTROCARDIOGRAM REPORT: CPT | Performed by: INTERNAL MEDICINE

## 2023-08-08 PROCEDURE — 82948 REAGENT STRIP/BLOOD GLUCOSE: CPT

## 2023-08-08 PROCEDURE — 99232 SBSQ HOSP IP/OBS MODERATE 35: CPT | Performed by: NURSE PRACTITIONER

## 2023-08-08 PROCEDURE — 96366 THER/PROPH/DIAG IV INF ADDON: CPT

## 2023-08-08 PROCEDURE — 85025 COMPLETE CBC W/AUTO DIFF WBC: CPT

## 2023-08-08 RX ORDER — WARFARIN SODIUM 7.5 MG/1
7.5 TABLET ORAL
Status: COMPLETED | OUTPATIENT
Start: 2023-08-08 | End: 2023-08-08

## 2023-08-08 RX ORDER — PHENOBARBITAL 32.4 MG/1
32.4 TABLET ORAL 2 TIMES DAILY
Status: COMPLETED | OUTPATIENT
Start: 2023-08-10 | End: 2023-08-11

## 2023-08-08 RX ORDER — PHENOBARBITAL 32.4 MG/1
64.8 TABLET ORAL 2 TIMES DAILY
Status: COMPLETED | OUTPATIENT
Start: 2023-08-09 | End: 2023-08-09

## 2023-08-08 RX ORDER — HYDRALAZINE HYDROCHLORIDE 25 MG/1
25 TABLET, FILM COATED ORAL EVERY 8 HOURS SCHEDULED
Status: DISCONTINUED | OUTPATIENT
Start: 2023-08-08 | End: 2023-08-16 | Stop reason: HOSPADM

## 2023-08-08 RX ORDER — LIDOCAINE 4 G/G
3 PATCH TOPICAL DAILY
Status: DISCONTINUED | OUTPATIENT
Start: 2023-08-08 | End: 2023-08-16 | Stop reason: HOSPADM

## 2023-08-08 RX ORDER — INSULIN LISPRO 100 [IU]/ML
0-4 INJECTION, SOLUTION INTRAVENOUS; SUBCUTANEOUS NIGHTLY
Status: DISCONTINUED | OUTPATIENT
Start: 2023-08-08 | End: 2023-08-16 | Stop reason: HOSPADM

## 2023-08-08 RX ORDER — INSULIN LISPRO 100 [IU]/ML
0-8 INJECTION, SOLUTION INTRAVENOUS; SUBCUTANEOUS
Status: DISCONTINUED | OUTPATIENT
Start: 2023-08-08 | End: 2023-08-16 | Stop reason: HOSPADM

## 2023-08-08 RX ADMIN — DOXAZOSIN 2 MG: 2 TABLET ORAL at 09:56

## 2023-08-08 RX ADMIN — HYDRALAZINE HYDROCHLORIDE 25 MG: 25 TABLET, FILM COATED ORAL at 14:11

## 2023-08-08 RX ADMIN — PHENOBARBITAL SODIUM 260 MG: 65 INJECTION INTRAMUSCULAR at 01:12

## 2023-08-08 RX ADMIN — MEXILETINE HYDROCHLORIDE 300 MG: 150 CAPSULE ORAL at 09:56

## 2023-08-08 RX ADMIN — SODIUM CHLORIDE, PRESERVATIVE FREE 10 ML: 5 INJECTION INTRAVENOUS at 09:58

## 2023-08-08 RX ADMIN — PHENOBARBITAL SODIUM 328.9 MG: 65 INJECTION INTRAMUSCULAR at 17:34

## 2023-08-08 RX ADMIN — METOPROLOL SUCCINATE 100 MG: 100 TABLET, EXTENDED RELEASE ORAL at 09:56

## 2023-08-08 RX ADMIN — INSULIN LISPRO 2 UNITS: 100 INJECTION, SOLUTION INTRAVENOUS; SUBCUTANEOUS at 17:12

## 2023-08-08 RX ADMIN — POTASSIUM CHLORIDE 20 MEQ: 1500 TABLET, EXTENDED RELEASE ORAL at 09:55

## 2023-08-08 RX ADMIN — ISOSORBIDE MONONITRATE 120 MG: 60 TABLET, EXTENDED RELEASE ORAL at 10:00

## 2023-08-08 RX ADMIN — Medication 100 MG: at 09:56

## 2023-08-08 RX ADMIN — DEXAMETHASONE SODIUM PHOSPHATE 8 MG: 4 INJECTION, SOLUTION INTRA-ARTICULAR; INTRALESIONAL; INTRAMUSCULAR; INTRAVENOUS; SOFT TISSUE at 13:07

## 2023-08-08 RX ADMIN — DEXAMETHASONE SODIUM PHOSPHATE 8 MG: 4 INJECTION, SOLUTION INTRA-ARTICULAR; INTRALESIONAL; INTRAMUSCULAR; INTRAVENOUS; SOFT TISSUE at 05:24

## 2023-08-08 RX ADMIN — GABAPENTIN 100 MG: 100 CAPSULE ORAL at 14:11

## 2023-08-08 RX ADMIN — ALPRAZOLAM 0.5 MG: 0.5 TABLET ORAL at 00:21

## 2023-08-08 RX ADMIN — OXYCODONE HYDROCHLORIDE 5 MG: 5 TABLET ORAL at 13:19

## 2023-08-08 RX ADMIN — FOLIC ACID 1 MG: 1 TABLET ORAL at 09:56

## 2023-08-08 RX ADMIN — INSULIN GLARGINE 45 UNITS: 100 INJECTION, SOLUTION SUBCUTANEOUS at 21:17

## 2023-08-08 RX ADMIN — ACETAMINOPHEN 650 MG: 325 TABLET ORAL at 13:07

## 2023-08-08 RX ADMIN — ALPRAZOLAM 0.5 MG: 0.5 TABLET ORAL at 19:55

## 2023-08-08 RX ADMIN — AMIODARONE HYDROCHLORIDE 200 MG: 200 TABLET ORAL at 09:56

## 2023-08-08 RX ADMIN — INSULIN LISPRO 2 UNITS: 100 INJECTION, SOLUTION INTRAVENOUS; SUBCUTANEOUS at 13:07

## 2023-08-08 RX ADMIN — OXYCODONE 10 MG: 5 TABLET ORAL at 23:39

## 2023-08-08 RX ADMIN — HYDRALAZINE HYDROCHLORIDE 10 MG: 10 TABLET, FILM COATED ORAL at 00:21

## 2023-08-08 RX ADMIN — BUMETANIDE 2 MG: 1 TABLET ORAL at 09:56

## 2023-08-08 RX ADMIN — ALOGLIPTIN 12.5 MG: 12.5 TABLET, FILM COATED ORAL at 09:56

## 2023-08-08 RX ADMIN — MEXILETINE HYDROCHLORIDE 300 MG: 150 CAPSULE ORAL at 21:15

## 2023-08-08 RX ADMIN — TICAGRELOR 90 MG: 90 TABLET ORAL at 22:11

## 2023-08-08 RX ADMIN — OXYCODONE 10 MG: 5 TABLET ORAL at 17:31

## 2023-08-08 RX ADMIN — EMPAGLIFLOZIN 10 MG: 10 TABLET, FILM COATED ORAL at 09:56

## 2023-08-08 RX ADMIN — HYDRALAZINE HYDROCHLORIDE 25 MG: 25 TABLET, FILM COATED ORAL at 21:16

## 2023-08-08 RX ADMIN — HYDRALAZINE HYDROCHLORIDE 10 MG: 10 TABLET, FILM COATED ORAL at 05:24

## 2023-08-08 RX ADMIN — Medication 1 TABLET: at 09:56

## 2023-08-08 RX ADMIN — GABAPENTIN 100 MG: 100 CAPSULE ORAL at 21:15

## 2023-08-08 RX ADMIN — PHENOBARBITAL SODIUM 328.9 MG: 65 INJECTION INTRAMUSCULAR at 14:49

## 2023-08-08 RX ADMIN — MEXILETINE HYDROCHLORIDE 300 MG: 150 CAPSULE ORAL at 14:11

## 2023-08-08 RX ADMIN — GABAPENTIN 100 MG: 100 CAPSULE ORAL at 05:24

## 2023-08-08 RX ADMIN — WARFARIN SODIUM 7.5 MG: 7.5 TABLET ORAL at 17:37

## 2023-08-08 RX ADMIN — OXYCODONE 10 MG: 5 TABLET ORAL at 05:41

## 2023-08-08 RX ADMIN — INSULIN LISPRO 1 UNITS: 100 INJECTION, SOLUTION INTRAVENOUS; SUBCUTANEOUS at 10:01

## 2023-08-08 RX ADMIN — TICAGRELOR 90 MG: 90 TABLET ORAL at 09:55

## 2023-08-08 RX ADMIN — PHENOBARBITAL SODIUM 328.9 MG: 65 INJECTION INTRAMUSCULAR at 21:20

## 2023-08-08 ASSESSMENT — PAIN DESCRIPTION - ORIENTATION
ORIENTATION: ANTERIOR
ORIENTATION: RIGHT;LEFT
ORIENTATION: LOWER
ORIENTATION: RIGHT;LEFT

## 2023-08-08 ASSESSMENT — PAIN DESCRIPTION - DESCRIPTORS
DESCRIPTORS: ACHING
DESCRIPTORS: ACHING
DESCRIPTORS: SHARP;ACHING

## 2023-08-08 ASSESSMENT — PAIN SCALES - GENERAL
PAINLEVEL_OUTOF10: 8
PAINLEVEL_OUTOF10: 9
PAINLEVEL_OUTOF10: 4
PAINLEVEL_OUTOF10: 3
PAINLEVEL_OUTOF10: 6
PAINLEVEL_OUTOF10: 6
PAINLEVEL_OUTOF10: 4
PAINLEVEL_OUTOF10: 8
PAINLEVEL_OUTOF10: 8

## 2023-08-08 ASSESSMENT — PAIN DESCRIPTION - LOCATION
LOCATION: BACK
LOCATION: LEG
LOCATION: HEAD

## 2023-08-08 ASSESSMENT — LIFESTYLE VARIABLES
HOW OFTEN DO YOU HAVE A DRINK CONTAINING ALCOHOL: 2-3 TIMES A WEEK
HOW MANY STANDARD DRINKS CONTAINING ALCOHOL DO YOU HAVE ON A TYPICAL DAY: 5 OR 6

## 2023-08-08 NOTE — CARE COORDINATION
8/8/23, 2:53 PM EDT    DISCHARGE PLANNING EVALUATION       SW consult received, Patient will be seen by addiction services, SW consult deferred at this time.

## 2023-08-08 NOTE — FLOWSHEET NOTE
08/08/23 4498   Safe Environment   Safety Measures Call light within reach;Standard Safety Measures  (Virtual nurse safety round completed.)      Patient is awake and  alert and answers questions. No distress noted.

## 2023-08-08 NOTE — CARE COORDINATION
Case Management Assessment  Initial Evaluation    Date/Time of Evaluation: 8/8/2023 11:30 AM  Assessment Completed by: Bernard Quevedo RN    If patient is discharged prior to next notation, then this note serves as note for discharge by case management. Patient Name: Destiney Norton                   YOB: 1965  Diagnosis: BABAK (acute kidney injury) ([address]) [N17.9]  Hypertensive urgency [I16.0]  Acute exacerbation of chronic low back pain [M54.50, G89.29]  Fall, initial encounter [W19. XXXA]  Alcohol withdrawal syndrome without complication ([address]) [V22.449]  Spinal stenosis of lumbar region, unspecified whether neurogenic claudication present [M48.061]                   Date / Time: 8/7/2023  9:58 AM  [facility]     Patient Admission Status: Observation   Readmission Risk Low 0-14, Mod 15-19), High > 20: Readmission Risk Score: 22.1    Current PCP: ISABELLA Bell CNP  PCP verified by CM? Yes    Chart Reviewed: Yes      History Provided by: Patient, Medical Record  Patient Orientation: Person, Place    Patient Cognition: Other (see comment) (extremely lethargic at this time. Falls asleep frequently during assessment.)    Hospitalization in the last 30 days (Readmission):  No    If yes, Readmission Assessment in CM Navigator will be completed. Advance Directives:      Code Status: Full Code   Patient's Primary Decision Maker is: Named in [facility] E José Miguel     Primary Decision Maker: Mike Hernandez [employer]) - Child - [phone]    Discharge Planning:    Patient lives with: Alone Type of Home: House  Primary Care Giver: Self  Patient Support Systems include: Family Members   Current Financial resources: Other (Comment) (commercial)  Current community resources: None  Current services prior to admission: None            Current DME:              Type of Home Care services:  None    ADLS  Prior functional level: Independent in ADLs/IADLs  Current functional level:  Independent in

## 2023-08-08 NOTE — FLOWSHEET NOTE
08/08/23 0977   Safe Environment   Safety Measures Other (comment)  (Virtual Safety round)     Virtual RN called into patient room. Patient does not respond to verbal prompts. Virtual RN increases output volume and informs patient that camera will be turned on at this time to complete safety check. Camera turned on and pt observed to be in chair,  eyes closed, no acute distress noted, call bell within reach. No safety concerns noted at this time. Virtual to continue to round and educate as appropriate.

## 2023-08-08 NOTE — FLOWSHEET NOTE
08/08/23 1215   Safe Environment   Safety Measures Other (comment)  (Virtual Safety round completed)     Virtual RN called into patient room. Virtual RN receives consent to turn on camera. Patient observed in chair. Pt. Falling asleep during roun, not answering this vrn. Patient call light, bedside table within patient reach. No safety concerns noted at this time. Virtual to continue to round and educate as appropriate.

## 2023-08-08 NOTE — FLOWSHEET NOTE
08/08/23 1925   Safe Environment   Safety Measures Call light within reach;Standard Safety Measures  (Virtual nurse safety round completed.)     Patient is awake and  alert and answers questions. No distress noted.

## 2023-08-09 LAB
ANION GAP SERPL CALC-SCNC: 14 MEQ/L (ref 8–16)
BASOPHILS ABSOLUTE: 0 THOU/MM3 (ref 0–0.1)
BASOPHILS NFR BLD AUTO: 0.1 %
BUN SERPL-MCNC: 51 MG/DL (ref 7–22)
CALCIUM SERPL-MCNC: 7.9 MG/DL (ref 8.5–10.5)
CHLORIDE SERPL-SCNC: 103 MEQ/L (ref 98–111)
CO2 SERPL-SCNC: 22 MEQ/L (ref 23–33)
CREAT SERPL-MCNC: 3 MG/DL (ref 0.4–1.2)
DEPRECATED RDW RBC AUTO: 53.5 FL (ref 35–45)
EOSINOPHIL NFR BLD AUTO: 0 %
EOSINOPHILS ABSOLUTE: 0 THOU/MM3 (ref 0–0.4)
ERYTHROCYTE [DISTWIDTH] IN BLOOD BY AUTOMATED COUNT: 14.6 % (ref 11.5–14.5)
GFR SERPL CREATININE-BSD FRML MDRD: 23 ML/MIN/1.73M2
GLUCOSE BLD STRIP.AUTO-MCNC: 161 MG/DL (ref 70–108)
GLUCOSE BLD STRIP.AUTO-MCNC: 202 MG/DL (ref 70–108)
GLUCOSE BLD STRIP.AUTO-MCNC: 217 MG/DL (ref 70–108)
GLUCOSE BLD STRIP.AUTO-MCNC: 308 MG/DL (ref 70–108)
GLUCOSE SERPL-MCNC: 181 MG/DL (ref 70–108)
HCT VFR BLD AUTO: 29.6 % (ref 42–52)
HGB BLD-MCNC: 9.5 GM/DL (ref 14–18)
IMM GRANULOCYTES # BLD AUTO: 0.04 THOU/MM3 (ref 0–0.07)
IMM GRANULOCYTES NFR BLD AUTO: 0.4 %
INR PPP: 1.03 (ref 0.85–1.13)
LYMPHOCYTES ABSOLUTE: 0.1 THOU/MM3 (ref 1–4.8)
LYMPHOCYTES NFR BLD AUTO: 1.4 %
MCH RBC QN AUTO: 33.6 PG (ref 26–33)
MCHC RBC AUTO-ENTMCNC: 32.1 GM/DL (ref 32.2–35.5)
MCV RBC AUTO: 104.6 FL (ref 80–94)
MONOCYTES ABSOLUTE: 0.6 THOU/MM3 (ref 0.4–1.3)
MONOCYTES NFR BLD AUTO: 5.7 %
MRSA DNA SPEC QL NAA+PROBE: NEGATIVE
NEUTROPHILS NFR BLD AUTO: 92.4 %
NRBC BLD AUTO-RTO: 0 /100 WBC
PLATELET # BLD AUTO: 95 THOU/MM3 (ref 130–400)
PMV BLD AUTO: 10.9 FL (ref 9.4–12.4)
POTASSIUM SERPL-SCNC: 4.5 MEQ/L (ref 3.5–5.2)
RBC # BLD AUTO: 2.83 MILL/MM3 (ref 4.7–6.1)
SEGMENTED NEUTROPHILS ABSOLUTE COUNT: 9.2 THOU/MM3 (ref 1.8–7.7)
SODIUM SERPL-SCNC: 139 MEQ/L (ref 135–145)
WBC # BLD AUTO: 10 THOU/MM3 (ref 4.8–10.8)

## 2023-08-09 PROCEDURE — 85610 PROTHROMBIN TIME: CPT

## 2023-08-09 PROCEDURE — 80048 BASIC METABOLIC PNL TOTAL CA: CPT

## 2023-08-09 PROCEDURE — 1200000000 HC SEMI PRIVATE

## 2023-08-09 PROCEDURE — 82948 REAGENT STRIP/BLOOD GLUCOSE: CPT

## 2023-08-09 PROCEDURE — 2580000003 HC RX 258

## 2023-08-09 PROCEDURE — 36415 COLL VENOUS BLD VENIPUNCTURE: CPT

## 2023-08-09 PROCEDURE — 85025 COMPLETE CBC W/AUTO DIFF WBC: CPT

## 2023-08-09 PROCEDURE — 6370000000 HC RX 637 (ALT 250 FOR IP)

## 2023-08-09 PROCEDURE — 6370000000 HC RX 637 (ALT 250 FOR IP): Performed by: NURSE PRACTITIONER

## 2023-08-09 PROCEDURE — 87641 MR-STAPH DNA AMP PROBE: CPT

## 2023-08-09 PROCEDURE — 99232 SBSQ HOSP IP/OBS MODERATE 35: CPT

## 2023-08-09 PROCEDURE — G0378 HOSPITAL OBSERVATION PER HR: HCPCS

## 2023-08-09 PROCEDURE — 51798 US URINE CAPACITY MEASURE: CPT

## 2023-08-09 RX ORDER — WARFARIN SODIUM 7.5 MG/1
7.5 TABLET ORAL
Status: COMPLETED | OUTPATIENT
Start: 2023-08-09 | End: 2023-08-09

## 2023-08-09 RX ADMIN — DOXAZOSIN 2 MG: 2 TABLET ORAL at 08:56

## 2023-08-09 RX ADMIN — TICAGRELOR 90 MG: 90 TABLET ORAL at 20:35

## 2023-08-09 RX ADMIN — HYDRALAZINE HYDROCHLORIDE 25 MG: 25 TABLET, FILM COATED ORAL at 15:12

## 2023-08-09 RX ADMIN — GABAPENTIN 100 MG: 100 CAPSULE ORAL at 20:34

## 2023-08-09 RX ADMIN — OXYCODONE HYDROCHLORIDE 5 MG: 5 TABLET ORAL at 15:18

## 2023-08-09 RX ADMIN — Medication 1 TABLET: at 08:59

## 2023-08-09 RX ADMIN — Medication 100 MG: at 11:05

## 2023-08-09 RX ADMIN — OXYCODONE HYDROCHLORIDE 5 MG: 5 TABLET ORAL at 11:04

## 2023-08-09 RX ADMIN — ALOGLIPTIN 12.5 MG: 12.5 TABLET, FILM COATED ORAL at 08:57

## 2023-08-09 RX ADMIN — BUMETANIDE 2 MG: 1 TABLET ORAL at 08:57

## 2023-08-09 RX ADMIN — ISOSORBIDE MONONITRATE 120 MG: 60 TABLET, EXTENDED RELEASE ORAL at 08:56

## 2023-08-09 RX ADMIN — HYDRALAZINE HYDROCHLORIDE 25 MG: 25 TABLET, FILM COATED ORAL at 05:13

## 2023-08-09 RX ADMIN — MEXILETINE HYDROCHLORIDE 300 MG: 150 CAPSULE ORAL at 08:56

## 2023-08-09 RX ADMIN — OXYCODONE HYDROCHLORIDE 5 MG: 5 TABLET ORAL at 20:34

## 2023-08-09 RX ADMIN — PHENOBARBITAL 64.8 MG: 32.4 TABLET ORAL at 11:05

## 2023-08-09 RX ADMIN — PHENOBARBITAL 64.8 MG: 32.4 TABLET ORAL at 20:35

## 2023-08-09 RX ADMIN — AMIODARONE HYDROCHLORIDE 200 MG: 200 TABLET ORAL at 08:58

## 2023-08-09 RX ADMIN — TICAGRELOR 90 MG: 90 TABLET ORAL at 08:56

## 2023-08-09 RX ADMIN — INSULIN LISPRO 2 UNITS: 100 INJECTION, SOLUTION INTRAVENOUS; SUBCUTANEOUS at 08:58

## 2023-08-09 RX ADMIN — OXYCODONE 10 MG: 5 TABLET ORAL at 03:31

## 2023-08-09 RX ADMIN — METOPROLOL SUCCINATE 100 MG: 100 TABLET, EXTENDED RELEASE ORAL at 11:05

## 2023-08-09 RX ADMIN — GABAPENTIN 100 MG: 100 CAPSULE ORAL at 05:13

## 2023-08-09 RX ADMIN — EMPAGLIFLOZIN 10 MG: 10 TABLET, FILM COATED ORAL at 08:57

## 2023-08-09 RX ADMIN — MEXILETINE HYDROCHLORIDE 300 MG: 150 CAPSULE ORAL at 15:13

## 2023-08-09 RX ADMIN — INSULIN LISPRO 6 UNITS: 100 INJECTION, SOLUTION INTRAVENOUS; SUBCUTANEOUS at 12:17

## 2023-08-09 RX ADMIN — GABAPENTIN 100 MG: 100 CAPSULE ORAL at 15:12

## 2023-08-09 RX ADMIN — POTASSIUM CHLORIDE 20 MEQ: 1500 TABLET, EXTENDED RELEASE ORAL at 08:56

## 2023-08-09 RX ADMIN — WARFARIN SODIUM 7.5 MG: 7.5 TABLET ORAL at 18:34

## 2023-08-09 RX ADMIN — FOLIC ACID 1 MG: 1 TABLET ORAL at 08:57

## 2023-08-09 RX ADMIN — ALPRAZOLAM 0.5 MG: 0.5 TABLET ORAL at 22:18

## 2023-08-09 RX ADMIN — SODIUM CHLORIDE, PRESERVATIVE FREE 10 ML: 5 INJECTION INTRAVENOUS at 08:56

## 2023-08-09 RX ADMIN — MEXILETINE HYDROCHLORIDE 300 MG: 150 CAPSULE ORAL at 20:34

## 2023-08-09 RX ADMIN — INSULIN GLARGINE 45 UNITS: 100 INJECTION, SOLUTION SUBCUTANEOUS at 20:35

## 2023-08-09 ASSESSMENT — PAIN - FUNCTIONAL ASSESSMENT
PAIN_FUNCTIONAL_ASSESSMENT: PREVENTS OR INTERFERES SOME ACTIVE ACTIVITIES AND ADLS
PAIN_FUNCTIONAL_ASSESSMENT: PREVENTS OR INTERFERES SOME ACTIVE ACTIVITIES AND ADLS

## 2023-08-09 ASSESSMENT — PAIN DESCRIPTION - ONSET: ONSET: ON-GOING

## 2023-08-09 ASSESSMENT — PAIN SCALES - GENERAL
PAINLEVEL_OUTOF10: 8
PAINLEVEL_OUTOF10: 6
PAINLEVEL_OUTOF10: 0

## 2023-08-09 ASSESSMENT — PAIN DESCRIPTION - DESCRIPTORS
DESCRIPTORS: ACHING
DESCRIPTORS: ACHING

## 2023-08-09 ASSESSMENT — PAIN DESCRIPTION - ORIENTATION
ORIENTATION: LOWER
ORIENTATION: LOWER

## 2023-08-09 ASSESSMENT — PAIN SCALES - WONG BAKER
WONGBAKER_NUMERICALRESPONSE: 0
WONGBAKER_NUMERICALRESPONSE: 0

## 2023-08-09 ASSESSMENT — PAIN DESCRIPTION - PAIN TYPE: TYPE: CHRONIC PAIN

## 2023-08-09 ASSESSMENT — PAIN DESCRIPTION - LOCATION
LOCATION: BACK
LOCATION: BACK

## 2023-08-09 ASSESSMENT — PAIN DESCRIPTION - DIRECTION: RADIATING_TOWARDS: BILATERAL LEGS

## 2023-08-09 ASSESSMENT — PAIN DESCRIPTION - FREQUENCY: FREQUENCY: CONTINUOUS

## 2023-08-09 NOTE — FLOWSHEET NOTE
08/09/23 1003   Safe Environment   Safety Measures Call light within reach;Gripper socks  (Virtual Nurse Safety Round Complete)     Call placed to patients room, patient did not respond to audio, video turned on for safety purposes. Patient is resting in chair at bedside with eyes closed, call light within reach, no signs or symtpoms of distress noted.

## 2023-08-09 NOTE — CARE COORDINATION
8/9/23, 1:30 PM EDT    DISCHARGE ON GOING EVALUATION    Jeani Sandifer day: 0  Location: -01/001-A Reason for admit: BABAK (acute kidney injury) ([address]) [N17.9]  Hypertensive urgency [I16.0]  Acute exacerbation of chronic low back pain [M54.50, G89.29]  Fall, initial encounter [W19. XXXA]  Alcohol withdrawal syndrome without complication ([address]) [P92.688]  Spinal stenosis of lumbar region, unspecified whether neurogenic claudication present [M48.061]   Barriers to Discharge: creat 3.0, Ca+7.9. Phenobarb protocol, lyte replacement. Hospitalist is considering nephrology consult. PCP: ISABELLA Chaparro CNP  Readmission Risk Score: 22.3%  Patient Goals/Plan/Treatment Preferences: home alone. Resources provided by Addiction services.

## 2023-08-10 LAB
ANION GAP SERPL CALC-SCNC: 14 MEQ/L (ref 8–16)
BUN SERPL-MCNC: 64 MG/DL (ref 7–22)
CALCIUM SERPL-MCNC: 7.5 MG/DL (ref 8.5–10.5)
CHLORIDE SERPL-SCNC: 103 MEQ/L (ref 98–111)
CO2 SERPL-SCNC: 21 MEQ/L (ref 23–33)
CREAT SERPL-MCNC: 3.6 MG/DL (ref 0.4–1.2)
GFR SERPL CREATININE-BSD FRML MDRD: 19 ML/MIN/1.73M2
GLUCOSE BLD STRIP.AUTO-MCNC: 118 MG/DL (ref 70–108)
GLUCOSE BLD STRIP.AUTO-MCNC: 124 MG/DL (ref 70–108)
GLUCOSE BLD STRIP.AUTO-MCNC: 178 MG/DL (ref 70–108)
GLUCOSE BLD STRIP.AUTO-MCNC: 96 MG/DL (ref 70–108)
GLUCOSE SERPL-MCNC: 98 MG/DL (ref 70–108)
INR PPP: 1.53 (ref 0.85–1.13)
POTASSIUM SERPL-SCNC: 3.6 MEQ/L (ref 3.5–5.2)
SODIUM SERPL-SCNC: 138 MEQ/L (ref 135–145)

## 2023-08-10 PROCEDURE — 85610 PROTHROMBIN TIME: CPT

## 2023-08-10 PROCEDURE — 82948 REAGENT STRIP/BLOOD GLUCOSE: CPT

## 2023-08-10 PROCEDURE — 6370000000 HC RX 637 (ALT 250 FOR IP): Performed by: NURSE PRACTITIONER

## 2023-08-10 PROCEDURE — 80048 BASIC METABOLIC PNL TOTAL CA: CPT

## 2023-08-10 PROCEDURE — 97166 OT EVAL MOD COMPLEX 45 MIN: CPT

## 2023-08-10 PROCEDURE — 99222 1ST HOSP IP/OBS MODERATE 55: CPT | Performed by: INTERNAL MEDICINE

## 2023-08-10 PROCEDURE — 2580000003 HC RX 258

## 2023-08-10 PROCEDURE — 6370000000 HC RX 637 (ALT 250 FOR IP)

## 2023-08-10 PROCEDURE — 97530 THERAPEUTIC ACTIVITIES: CPT

## 2023-08-10 PROCEDURE — 99232 SBSQ HOSP IP/OBS MODERATE 35: CPT

## 2023-08-10 PROCEDURE — 1200000000 HC SEMI PRIVATE

## 2023-08-10 PROCEDURE — 36415 COLL VENOUS BLD VENIPUNCTURE: CPT

## 2023-08-10 RX ORDER — SODIUM CHLORIDE 9 MG/ML
INJECTION, SOLUTION INTRAVENOUS CONTINUOUS
Status: DISCONTINUED | OUTPATIENT
Start: 2023-08-10 | End: 2023-08-14

## 2023-08-10 RX ORDER — WARFARIN SODIUM 2.5 MG/1
2.5 TABLET ORAL
Status: DISCONTINUED | OUTPATIENT
Start: 2023-08-10 | End: 2023-08-10

## 2023-08-10 RX ADMIN — SODIUM CHLORIDE, PRESERVATIVE FREE 10 ML: 5 INJECTION INTRAVENOUS at 09:45

## 2023-08-10 RX ADMIN — SODIUM CHLORIDE: 9 INJECTION, SOLUTION INTRAVENOUS at 14:58

## 2023-08-10 RX ADMIN — MEXILETINE HYDROCHLORIDE 300 MG: 150 CAPSULE ORAL at 09:44

## 2023-08-10 RX ADMIN — FOLIC ACID 1 MG: 1 TABLET ORAL at 09:45

## 2023-08-10 RX ADMIN — ISOSORBIDE MONONITRATE 120 MG: 60 TABLET, EXTENDED RELEASE ORAL at 09:45

## 2023-08-10 RX ADMIN — MEXILETINE HYDROCHLORIDE 300 MG: 150 CAPSULE ORAL at 14:54

## 2023-08-10 RX ADMIN — OXYCODONE 10 MG: 5 TABLET ORAL at 09:46

## 2023-08-10 RX ADMIN — GABAPENTIN 100 MG: 100 CAPSULE ORAL at 06:31

## 2023-08-10 RX ADMIN — GABAPENTIN 100 MG: 100 CAPSULE ORAL at 14:54

## 2023-08-10 RX ADMIN — OXYCODONE 10 MG: 5 TABLET ORAL at 15:28

## 2023-08-10 RX ADMIN — EMPAGLIFLOZIN 10 MG: 10 TABLET, FILM COATED ORAL at 09:45

## 2023-08-10 RX ADMIN — ALOGLIPTIN 12.5 MG: 12.5 TABLET, FILM COATED ORAL at 09:45

## 2023-08-10 RX ADMIN — AMIODARONE HYDROCHLORIDE 200 MG: 200 TABLET ORAL at 09:45

## 2023-08-10 RX ADMIN — OXYCODONE 10 MG: 5 TABLET ORAL at 19:38

## 2023-08-10 RX ADMIN — OXYCODONE 10 MG: 5 TABLET ORAL at 03:10

## 2023-08-10 RX ADMIN — POTASSIUM CHLORIDE 20 MEQ: 1500 TABLET, EXTENDED RELEASE ORAL at 09:44

## 2023-08-10 RX ADMIN — DOXAZOSIN 2 MG: 2 TABLET ORAL at 09:45

## 2023-08-10 RX ADMIN — ALPRAZOLAM 0.5 MG: 0.5 TABLET ORAL at 19:31

## 2023-08-10 RX ADMIN — Medication 100 MG: at 09:44

## 2023-08-10 RX ADMIN — METOPROLOL SUCCINATE 100 MG: 100 TABLET, EXTENDED RELEASE ORAL at 09:45

## 2023-08-10 RX ADMIN — TICAGRELOR 90 MG: 90 TABLET ORAL at 09:44

## 2023-08-10 RX ADMIN — PHENOBARBITAL 32.4 MG: 32.4 TABLET ORAL at 10:31

## 2023-08-10 RX ADMIN — Medication 1 TABLET: at 09:44

## 2023-08-10 ASSESSMENT — ENCOUNTER SYMPTOMS
VOMITING: 0
COUGH: 0
DIARRHEA: 0
ABDOMINAL PAIN: 0
EYES NEGATIVE: 1
SHORTNESS OF BREATH: 0
BACK PAIN: 1
NAUSEA: 0

## 2023-08-10 ASSESSMENT — PAIN - FUNCTIONAL ASSESSMENT
PAIN_FUNCTIONAL_ASSESSMENT: ACTIVITIES ARE NOT PREVENTED
PAIN_FUNCTIONAL_ASSESSMENT: PREVENTS OR INTERFERES SOME ACTIVE ACTIVITIES AND ADLS
PAIN_FUNCTIONAL_ASSESSMENT: ACTIVITIES ARE NOT PREVENTED

## 2023-08-10 ASSESSMENT — PAIN DESCRIPTION - DESCRIPTORS
DESCRIPTORS: ACHING;BURNING;STABBING;SHOOTING
DESCRIPTORS: ACHING;BURNING;STABBING;SHOOTING
DESCRIPTORS: ACHING;BURNING;SHOOTING;STABBING
DESCRIPTORS: ACHING;BURNING;STABBING;SHOOTING

## 2023-08-10 ASSESSMENT — PAIN DESCRIPTION - ORIENTATION
ORIENTATION: MID;LOWER
ORIENTATION: RIGHT;LEFT
ORIENTATION: MID;LOWER;RIGHT;LEFT
ORIENTATION: RIGHT;LEFT;LOWER;MID

## 2023-08-10 ASSESSMENT — PAIN DESCRIPTION - LOCATION
LOCATION: BACK;LEG
LOCATION: BACK;LEG
LOCATION: BACK
LOCATION: BACK;LEG

## 2023-08-10 ASSESSMENT — PAIN DESCRIPTION - PAIN TYPE: TYPE: CHRONIC PAIN

## 2023-08-10 ASSESSMENT — PAIN SCALES - GENERAL
PAINLEVEL_OUTOF10: 8
PAINLEVEL_OUTOF10: 8
PAINLEVEL_OUTOF10: 6
PAINLEVEL_OUTOF10: 8

## 2023-08-10 NOTE — FLOWSHEET NOTE
[facility]  PHYSICAL THERAPY MISSED TREATMENT NOTE  STRZ RENAL TELEMETRY 6K    Date: 8/10/2023  Patient Name: Eliceo Castillo        MRN: [id number]   : 1965  (62 y.o.)  Gender: male   Referring Practitioner: Marian Dickey PA-C  Diagnosis: BABAK (acute kidney injury)         REASON FOR MISSED TREATMENT:  Missed Treat. RN approved session. Patient sitting edge of bed finished with breakfast upon arrival. Patient refused therapy despite encouragement due to BLE and back pain and wanting to talk with the hospitalist and neurology about POC first. Will try back later as time allows.

## 2023-08-10 NOTE — CARE COORDINATION
8/10/23, 8:56 AM EDT    DISCHARGE ON GOING [address] day: 1  Location: -01/001-A Reason for admit: BABAK (acute kidney injury) ([address]) [N17.9]  Hypertensive urgency [I16.0]  Acute exacerbation of chronic low back pain [M54.50, G89.29]  Fall, initial encounter [W19. XXXA]  Alcohol withdrawal syndrome without complication ([address] ) [F91.948]  Spinal stenosis of lumbar region, unspecified whether neurogenic claudication present [M48.061]   Barriers to Discharge: creat increased to 3.6 (baseline is 2.6-2.7). Cozaar and bumex remain on hold. Nephrology consulted. Day 3 of phenobarb protocol. Ortho-spine consult per patient request.   PCP: ISABELLA Gipson CNP  Readmission Risk Score: 22.4%  Patient Goals/Plan/Treatment Preferences: Home alone.

## 2023-08-11 LAB
ANION GAP SERPL CALC-SCNC: 14 MEQ/L (ref 8–16)
BUN SERPL-MCNC: 66 MG/DL (ref 7–22)
CALCIUM SERPL-MCNC: 7.5 MG/DL (ref 8.5–10.5)
CHLORIDE 24H UR-SRATE: < 20 MEQ/L
CHLORIDE SERPL-SCNC: 104 MEQ/L (ref 98–111)
CO2 SERPL-SCNC: 20 MEQ/L (ref 23–33)
CREAT SERPL-MCNC: 3.7 MG/DL (ref 0.4–1.2)
EKG ATRIAL RATE: 72 BPM
EKG P AXIS: 65 DEGREES
EKG P-R INTERVAL: 200 MS
EKG Q-T INTERVAL: 424 MS
EKG QRS DURATION: 90 MS
EKG QTC CALCULATION (BAZETT): 464 MS
EKG R AXIS: -6 DEGREES
EKG T AXIS: 37 DEGREES
EKG VENTRICULAR RATE: 72 BPM
GFR SERPL CREATININE-BSD FRML MDRD: 18 ML/MIN/1.73M2
GLUCOSE BLD STRIP.AUTO-MCNC: 102 MG/DL (ref 70–108)
GLUCOSE BLD STRIP.AUTO-MCNC: 108 MG/DL (ref 70–108)
GLUCOSE BLD STRIP.AUTO-MCNC: 167 MG/DL (ref 70–108)
GLUCOSE BLD STRIP.AUTO-MCNC: 188 MG/DL (ref 70–108)
GLUCOSE BLD STRIP.AUTO-MCNC: 66 MG/DL (ref 70–108)
GLUCOSE SERPL-MCNC: 56 MG/DL (ref 70–108)
INR PPP: 1.91 (ref 0.85–1.13)
POTASSIUM SERPL-SCNC: 3.9 MEQ/L (ref 3.5–5.2)
POTASSIUM UR-SCNC: 41.2 MEQ/L
SODIUM SERPL-SCNC: 138 MEQ/L (ref 135–145)
SODIUM UR-SCNC: < 20 MEQ/L

## 2023-08-11 PROCEDURE — 80048 BASIC METABOLIC PNL TOTAL CA: CPT

## 2023-08-11 PROCEDURE — 82436 ASSAY OF URINE CHLORIDE: CPT

## 2023-08-11 PROCEDURE — 84133 ASSAY OF URINE POTASSIUM: CPT

## 2023-08-11 PROCEDURE — 6370000000 HC RX 637 (ALT 250 FOR IP)

## 2023-08-11 PROCEDURE — 85610 PROTHROMBIN TIME: CPT

## 2023-08-11 PROCEDURE — 6370000000 HC RX 637 (ALT 250 FOR IP): Performed by: NURSE PRACTITIONER

## 2023-08-11 PROCEDURE — 2580000003 HC RX 258

## 2023-08-11 PROCEDURE — 84300 ASSAY OF URINE SODIUM: CPT

## 2023-08-11 PROCEDURE — 82948 REAGENT STRIP/BLOOD GLUCOSE: CPT

## 2023-08-11 PROCEDURE — 1200000000 HC SEMI PRIVATE

## 2023-08-11 PROCEDURE — 99232 SBSQ HOSP IP/OBS MODERATE 35: CPT | Performed by: INTERNAL MEDICINE

## 2023-08-11 PROCEDURE — 36415 COLL VENOUS BLD VENIPUNCTURE: CPT

## 2023-08-11 RX ORDER — OXYCODONE HYDROCHLORIDE 5 MG/1
5 TABLET ORAL EVERY 6 HOURS PRN
Status: DISCONTINUED | OUTPATIENT
Start: 2023-08-11 | End: 2023-08-12

## 2023-08-11 RX ORDER — OXYCODONE HYDROCHLORIDE 5 MG/1
10 TABLET ORAL EVERY 6 HOURS PRN
Status: DISCONTINUED | OUTPATIENT
Start: 2023-08-11 | End: 2023-08-12

## 2023-08-11 RX ADMIN — EMPAGLIFLOZIN 10 MG: 10 TABLET, FILM COATED ORAL at 10:34

## 2023-08-11 RX ADMIN — MEXILETINE HYDROCHLORIDE 300 MG: 150 CAPSULE ORAL at 10:32

## 2023-08-11 RX ADMIN — GABAPENTIN 100 MG: 100 CAPSULE ORAL at 00:11

## 2023-08-11 RX ADMIN — MEXILETINE HYDROCHLORIDE 300 MG: 150 CAPSULE ORAL at 16:19

## 2023-08-11 RX ADMIN — SODIUM CHLORIDE, PRESERVATIVE FREE 10 ML: 5 INJECTION INTRAVENOUS at 10:34

## 2023-08-11 RX ADMIN — OXYCODONE 10 MG: 5 TABLET ORAL at 17:31

## 2023-08-11 RX ADMIN — OXYCODONE 10 MG: 5 TABLET ORAL at 23:31

## 2023-08-11 RX ADMIN — ALOGLIPTIN 12.5 MG: 12.5 TABLET, FILM COATED ORAL at 10:34

## 2023-08-11 RX ADMIN — GABAPENTIN 100 MG: 100 CAPSULE ORAL at 13:52

## 2023-08-11 RX ADMIN — Medication 1 TABLET: at 10:33

## 2023-08-11 RX ADMIN — HYDRALAZINE HYDROCHLORIDE 25 MG: 25 TABLET, FILM COATED ORAL at 20:23

## 2023-08-11 RX ADMIN — FOLIC ACID 1 MG: 1 TABLET ORAL at 10:32

## 2023-08-11 RX ADMIN — ISOSORBIDE MONONITRATE 120 MG: 60 TABLET, EXTENDED RELEASE ORAL at 10:33

## 2023-08-11 RX ADMIN — DOXAZOSIN 2 MG: 2 TABLET ORAL at 10:34

## 2023-08-11 RX ADMIN — OXYCODONE 10 MG: 5 TABLET ORAL at 11:07

## 2023-08-11 RX ADMIN — PHENOBARBITAL 32.4 MG: 32.4 TABLET ORAL at 10:32

## 2023-08-11 RX ADMIN — OXYCODONE 10 MG: 5 TABLET ORAL at 01:36

## 2023-08-11 RX ADMIN — MEXILETINE HYDROCHLORIDE 300 MG: 150 CAPSULE ORAL at 00:11

## 2023-08-11 RX ADMIN — GABAPENTIN 100 MG: 100 CAPSULE ORAL at 06:05

## 2023-08-11 RX ADMIN — GABAPENTIN 100 MG: 100 CAPSULE ORAL at 23:37

## 2023-08-11 RX ADMIN — OXYCODONE 10 MG: 5 TABLET ORAL at 06:45

## 2023-08-11 RX ADMIN — POTASSIUM CHLORIDE 20 MEQ: 1500 TABLET, EXTENDED RELEASE ORAL at 10:32

## 2023-08-11 RX ADMIN — METOPROLOL SUCCINATE 100 MG: 100 TABLET, EXTENDED RELEASE ORAL at 10:32

## 2023-08-11 RX ADMIN — Medication 100 MG: at 10:32

## 2023-08-11 RX ADMIN — AMIODARONE HYDROCHLORIDE 200 MG: 200 TABLET ORAL at 10:34

## 2023-08-11 RX ADMIN — PHENOBARBITAL 32.4 MG: 32.4 TABLET ORAL at 00:11

## 2023-08-11 RX ADMIN — MEXILETINE HYDROCHLORIDE 300 MG: 150 CAPSULE ORAL at 20:21

## 2023-08-11 ASSESSMENT — PAIN SCALES - GENERAL
PAINLEVEL_OUTOF10: 8
PAINLEVEL_OUTOF10: 9
PAINLEVEL_OUTOF10: 10

## 2023-08-11 ASSESSMENT — PAIN DESCRIPTION - ORIENTATION
ORIENTATION: LOWER
ORIENTATION: LEFT;RIGHT

## 2023-08-11 ASSESSMENT — PAIN DESCRIPTION - LOCATION
LOCATION: BACK
LOCATION: BACK
LOCATION: BACK;NECK;LEG
LOCATION: BACK;NECK;LEG
LOCATION: BACK

## 2023-08-11 ASSESSMENT — PAIN DESCRIPTION - DESCRIPTORS: DESCRIPTORS: ACHING

## 2023-08-11 NOTE — CARE COORDINATION
8/11/23, 3:02 PM EDT    DISCHARGE ON GOING EVALUATION    Kristian Hou day: 2  Location: [facility]01/001-A Reason for admit: BABAK (acute kidney injury) ([address]) [N17.9]  Hypertensive urgency [I16.0]  Acute exacerbation of chronic low back pain [M54.50, G89.29]  Fall, initial encounter [W19. XXXA]  Alcohol withdrawal syndrome without complication ([address]) [D68.383]  Spinal stenosis of lumbar region, unspecified whether neurogenic claudication present [M48.061]   Barriers to Discharge: orthospine consulted for possible epidural injection, Nephro following for BABAK, IVF, creat 3.7 today  PCP: ISABELLA Lawton CNP  Readmission Risk Score: 21.6%  Patient Goals/Plan/Treatment Preferences: Home alone

## 2023-08-12 LAB
ANION GAP SERPL CALC-SCNC: 13 MEQ/L (ref 8–16)
BASOPHILS ABSOLUTE: 0 THOU/MM3 (ref 0–0.1)
BASOPHILS NFR BLD AUTO: 0.1 %
BUN SERPL-MCNC: 60 MG/DL (ref 7–22)
CALCIUM SERPL-MCNC: 8.2 MG/DL (ref 8.5–10.5)
CHLORIDE SERPL-SCNC: 107 MEQ/L (ref 98–111)
CO2 SERPL-SCNC: 21 MEQ/L (ref 23–33)
CREAT SERPL-MCNC: 3.7 MG/DL (ref 0.4–1.2)
DEPRECATED RDW RBC AUTO: 57.5 FL (ref 35–45)
EOSINOPHIL NFR BLD AUTO: 1.2 %
EOSINOPHILS ABSOLUTE: 0.1 THOU/MM3 (ref 0–0.4)
ERYTHROCYTE [DISTWIDTH] IN BLOOD BY AUTOMATED COUNT: 15.6 % (ref 11.5–14.5)
GFR SERPL CREATININE-BSD FRML MDRD: 18 ML/MIN/1.73M2
GLUCOSE BLD STRIP.AUTO-MCNC: 127 MG/DL (ref 70–108)
GLUCOSE BLD STRIP.AUTO-MCNC: 134 MG/DL (ref 70–108)
GLUCOSE BLD STRIP.AUTO-MCNC: 135 MG/DL (ref 70–108)
GLUCOSE BLD STRIP.AUTO-MCNC: 268 MG/DL (ref 70–108)
GLUCOSE SERPL-MCNC: 100 MG/DL (ref 70–108)
HCT VFR BLD AUTO: 28.2 % (ref 42–52)
HGB BLD-MCNC: 9 GM/DL (ref 14–18)
IMM GRANULOCYTES # BLD AUTO: 0.1 THOU/MM3 (ref 0–0.07)
IMM GRANULOCYTES NFR BLD AUTO: 1 %
INR PPP: 1.55 (ref 0.85–1.13)
LYMPHOCYTES ABSOLUTE: 0.6 THOU/MM3 (ref 1–4.8)
LYMPHOCYTES NFR BLD AUTO: 5.5 %
MCH RBC QN AUTO: 34 PG (ref 26–33)
MCHC RBC AUTO-ENTMCNC: 31.9 GM/DL (ref 32.2–35.5)
MCV RBC AUTO: 106.4 FL (ref 80–94)
MONOCYTES ABSOLUTE: 1.1 THOU/MM3 (ref 0.4–1.3)
MONOCYTES NFR BLD AUTO: 10.1 %
NEUTROPHILS NFR BLD AUTO: 82.1 %
NRBC BLD AUTO-RTO: 0 /100 WBC
PLATELET # BLD AUTO: 99 THOU/MM3 (ref 130–400)
PMV BLD AUTO: 10.9 FL (ref 9.4–12.4)
POTASSIUM SERPL-SCNC: 4.8 MEQ/L (ref 3.5–5.2)
RBC # BLD AUTO: 2.65 MILL/MM3 (ref 4.7–6.1)
SEGMENTED NEUTROPHILS ABSOLUTE COUNT: 8.6 THOU/MM3 (ref 1.8–7.7)
SODIUM SERPL-SCNC: 141 MEQ/L (ref 135–145)
WBC # BLD AUTO: 10.5 THOU/MM3 (ref 4.8–10.8)

## 2023-08-12 PROCEDURE — 99232 SBSQ HOSP IP/OBS MODERATE 35: CPT | Performed by: INTERNAL MEDICINE

## 2023-08-12 PROCEDURE — 6370000000 HC RX 637 (ALT 250 FOR IP)

## 2023-08-12 PROCEDURE — 85025 COMPLETE CBC W/AUTO DIFF WBC: CPT

## 2023-08-12 PROCEDURE — 2580000003 HC RX 258

## 2023-08-12 PROCEDURE — 99232 SBSQ HOSP IP/OBS MODERATE 35: CPT

## 2023-08-12 PROCEDURE — 6370000000 HC RX 637 (ALT 250 FOR IP): Performed by: NURSE PRACTITIONER

## 2023-08-12 PROCEDURE — 36415 COLL VENOUS BLD VENIPUNCTURE: CPT

## 2023-08-12 PROCEDURE — 1200000000 HC SEMI PRIVATE

## 2023-08-12 PROCEDURE — 85610 PROTHROMBIN TIME: CPT

## 2023-08-12 PROCEDURE — 82948 REAGENT STRIP/BLOOD GLUCOSE: CPT

## 2023-08-12 PROCEDURE — 80048 BASIC METABOLIC PNL TOTAL CA: CPT

## 2023-08-12 RX ORDER — OXYCODONE HYDROCHLORIDE 5 MG/1
10 TABLET ORAL EVERY 4 HOURS PRN
Status: DISCONTINUED | OUTPATIENT
Start: 2023-08-12 | End: 2023-08-16 | Stop reason: HOSPADM

## 2023-08-12 RX ORDER — OXYCODONE HYDROCHLORIDE 5 MG/1
5 TABLET ORAL EVERY 4 HOURS PRN
Status: DISCONTINUED | OUTPATIENT
Start: 2023-08-12 | End: 2023-08-16 | Stop reason: HOSPADM

## 2023-08-12 RX ADMIN — HYDRALAZINE HYDROCHLORIDE 25 MG: 25 TABLET, FILM COATED ORAL at 23:38

## 2023-08-12 RX ADMIN — HYDRALAZINE HYDROCHLORIDE 25 MG: 25 TABLET, FILM COATED ORAL at 14:52

## 2023-08-12 RX ADMIN — FOLIC ACID 1 MG: 1 TABLET ORAL at 08:31

## 2023-08-12 RX ADMIN — OXYCODONE 10 MG: 5 TABLET ORAL at 20:49

## 2023-08-12 RX ADMIN — SODIUM CHLORIDE: 9 INJECTION, SOLUTION INTRAVENOUS at 04:18

## 2023-08-12 RX ADMIN — METOPROLOL SUCCINATE 100 MG: 100 TABLET, EXTENDED RELEASE ORAL at 08:30

## 2023-08-12 RX ADMIN — Medication 1 TABLET: at 08:30

## 2023-08-12 RX ADMIN — OXYCODONE 10 MG: 5 TABLET ORAL at 11:39

## 2023-08-12 RX ADMIN — MEXILETINE HYDROCHLORIDE 300 MG: 150 CAPSULE ORAL at 20:00

## 2023-08-12 RX ADMIN — AMIODARONE HYDROCHLORIDE 200 MG: 200 TABLET ORAL at 08:29

## 2023-08-12 RX ADMIN — Medication 100 MG: at 08:28

## 2023-08-12 RX ADMIN — OXYCODONE 10 MG: 5 TABLET ORAL at 05:40

## 2023-08-12 RX ADMIN — GABAPENTIN 100 MG: 100 CAPSULE ORAL at 14:51

## 2023-08-12 RX ADMIN — HYDRALAZINE HYDROCHLORIDE 25 MG: 25 TABLET, FILM COATED ORAL at 04:20

## 2023-08-12 RX ADMIN — GABAPENTIN 100 MG: 100 CAPSULE ORAL at 23:37

## 2023-08-12 RX ADMIN — ISOSORBIDE MONONITRATE 120 MG: 60 TABLET, EXTENDED RELEASE ORAL at 08:29

## 2023-08-12 RX ADMIN — DOXAZOSIN 2 MG: 2 TABLET ORAL at 08:28

## 2023-08-12 RX ADMIN — ALPRAZOLAM 0.5 MG: 0.5 TABLET ORAL at 23:51

## 2023-08-12 RX ADMIN — EMPAGLIFLOZIN 10 MG: 10 TABLET, FILM COATED ORAL at 08:30

## 2023-08-12 RX ADMIN — MEXILETINE HYDROCHLORIDE 300 MG: 150 CAPSULE ORAL at 08:29

## 2023-08-12 RX ADMIN — GABAPENTIN 100 MG: 100 CAPSULE ORAL at 04:21

## 2023-08-12 RX ADMIN — OXYCODONE 10 MG: 5 TABLET ORAL at 16:17

## 2023-08-12 RX ADMIN — MEXILETINE HYDROCHLORIDE 300 MG: 150 CAPSULE ORAL at 14:51

## 2023-08-12 RX ADMIN — POTASSIUM CHLORIDE 20 MEQ: 1500 TABLET, EXTENDED RELEASE ORAL at 08:28

## 2023-08-12 RX ADMIN — ALOGLIPTIN 12.5 MG: 12.5 TABLET, FILM COATED ORAL at 08:30

## 2023-08-12 RX ADMIN — ALPRAZOLAM 0.5 MG: 0.5 TABLET ORAL at 04:19

## 2023-08-12 RX ADMIN — SODIUM CHLORIDE: 9 INJECTION, SOLUTION INTRAVENOUS at 17:12

## 2023-08-12 ASSESSMENT — PAIN SCALES - GENERAL
PAINLEVEL_OUTOF10: 9
PAINLEVEL_OUTOF10: 8
PAINLEVEL_OUTOF10: 9
PAINLEVEL_OUTOF10: 8

## 2023-08-12 ASSESSMENT — PAIN DESCRIPTION - LOCATION
LOCATION: BACK;SHOULDER;LEG
LOCATION: BACK
LOCATION: KNEE
LOCATION: BACK
LOCATION: BACK

## 2023-08-12 ASSESSMENT — PAIN DESCRIPTION - ORIENTATION
ORIENTATION: LOWER
ORIENTATION: LEFT

## 2023-08-13 LAB
ANION GAP SERPL CALC-SCNC: 11 MEQ/L (ref 8–16)
BASOPHILS ABSOLUTE: 0 THOU/MM3 (ref 0–0.1)
BASOPHILS NFR BLD AUTO: 0.3 %
BUN SERPL-MCNC: 48 MG/DL (ref 7–22)
CALCIUM SERPL-MCNC: 8 MG/DL (ref 8.5–10.5)
CHLORIDE SERPL-SCNC: 107 MEQ/L (ref 98–111)
CO2 SERPL-SCNC: 21 MEQ/L (ref 23–33)
CREAT SERPL-MCNC: 3.4 MG/DL (ref 0.4–1.2)
DEPRECATED RDW RBC AUTO: 56.3 FL (ref 35–45)
EOSINOPHIL NFR BLD AUTO: 2.6 %
EOSINOPHILS ABSOLUTE: 0.2 THOU/MM3 (ref 0–0.4)
ERYTHROCYTE [DISTWIDTH] IN BLOOD BY AUTOMATED COUNT: 15.2 % (ref 11.5–14.5)
GFR SERPL CREATININE-BSD FRML MDRD: 20 ML/MIN/1.73M2
GLUCOSE BLD STRIP.AUTO-MCNC: 124 MG/DL (ref 70–108)
GLUCOSE BLD STRIP.AUTO-MCNC: 127 MG/DL (ref 70–108)
GLUCOSE BLD STRIP.AUTO-MCNC: 176 MG/DL (ref 70–108)
GLUCOSE SERPL-MCNC: 90 MG/DL (ref 70–108)
HCT VFR BLD AUTO: 26.4 % (ref 42–52)
HGB BLD-MCNC: 8.3 GM/DL (ref 14–18)
IMM GRANULOCYTES # BLD AUTO: 0.04 THOU/MM3 (ref 0–0.07)
IMM GRANULOCYTES NFR BLD AUTO: 0.6 %
INR PPP: 1.39 (ref 0.85–1.13)
LYMPHOCYTES ABSOLUTE: 0.5 THOU/MM3 (ref 1–4.8)
LYMPHOCYTES NFR BLD AUTO: 7.8 %
MCH RBC QN AUTO: 33.7 PG (ref 26–33)
MCHC RBC AUTO-ENTMCNC: 31.4 GM/DL (ref 32.2–35.5)
MCV RBC AUTO: 107.3 FL (ref 80–94)
MONOCYTES ABSOLUTE: 0.9 THOU/MM3 (ref 0.4–1.3)
MONOCYTES NFR BLD AUTO: 13.5 %
NEUTROPHILS NFR BLD AUTO: 75.2 %
NRBC BLD AUTO-RTO: 0 /100 WBC
PLATELET # BLD AUTO: 100 THOU/MM3 (ref 130–400)
PMV BLD AUTO: 10.5 FL (ref 9.4–12.4)
POTASSIUM SERPL-SCNC: 4.8 MEQ/L (ref 3.5–5.2)
RBC # BLD AUTO: 2.46 MILL/MM3 (ref 4.7–6.1)
SEGMENTED NEUTROPHILS ABSOLUTE COUNT: 5.2 THOU/MM3 (ref 1.8–7.7)
SODIUM SERPL-SCNC: 139 MEQ/L (ref 135–145)
WBC # BLD AUTO: 6.9 THOU/MM3 (ref 4.8–10.8)

## 2023-08-13 PROCEDURE — 6370000000 HC RX 637 (ALT 250 FOR IP)

## 2023-08-13 PROCEDURE — 2580000003 HC RX 258

## 2023-08-13 PROCEDURE — 99232 SBSQ HOSP IP/OBS MODERATE 35: CPT | Performed by: INTERNAL MEDICINE

## 2023-08-13 PROCEDURE — 36415 COLL VENOUS BLD VENIPUNCTURE: CPT

## 2023-08-13 PROCEDURE — 85025 COMPLETE CBC W/AUTO DIFF WBC: CPT

## 2023-08-13 PROCEDURE — 82948 REAGENT STRIP/BLOOD GLUCOSE: CPT

## 2023-08-13 PROCEDURE — 80048 BASIC METABOLIC PNL TOTAL CA: CPT

## 2023-08-13 PROCEDURE — 1200000000 HC SEMI PRIVATE

## 2023-08-13 PROCEDURE — 99232 SBSQ HOSP IP/OBS MODERATE 35: CPT

## 2023-08-13 PROCEDURE — 6370000000 HC RX 637 (ALT 250 FOR IP): Performed by: NURSE PRACTITIONER

## 2023-08-13 PROCEDURE — 2580000003 HC RX 258: Performed by: INTERNAL MEDICINE

## 2023-08-13 PROCEDURE — 85610 PROTHROMBIN TIME: CPT

## 2023-08-13 RX ADMIN — GABAPENTIN 100 MG: 100 CAPSULE ORAL at 13:23

## 2023-08-13 RX ADMIN — ISOSORBIDE MONONITRATE 120 MG: 60 TABLET, EXTENDED RELEASE ORAL at 09:32

## 2023-08-13 RX ADMIN — SODIUM CHLORIDE: 9 INJECTION, SOLUTION INTRAVENOUS at 04:49

## 2023-08-13 RX ADMIN — OXYCODONE 10 MG: 5 TABLET ORAL at 13:23

## 2023-08-13 RX ADMIN — ALPRAZOLAM 0.5 MG: 0.5 TABLET ORAL at 22:36

## 2023-08-13 RX ADMIN — METOPROLOL SUCCINATE 100 MG: 100 TABLET, EXTENDED RELEASE ORAL at 09:32

## 2023-08-13 RX ADMIN — OXYCODONE 10 MG: 5 TABLET ORAL at 22:36

## 2023-08-13 RX ADMIN — AMIODARONE HYDROCHLORIDE 200 MG: 200 TABLET ORAL at 09:32

## 2023-08-13 RX ADMIN — MEXILETINE HYDROCHLORIDE 300 MG: 150 CAPSULE ORAL at 13:23

## 2023-08-13 RX ADMIN — GABAPENTIN 100 MG: 100 CAPSULE ORAL at 06:10

## 2023-08-13 RX ADMIN — GABAPENTIN 100 MG: 100 CAPSULE ORAL at 22:36

## 2023-08-13 RX ADMIN — OXYCODONE 10 MG: 5 TABLET ORAL at 04:46

## 2023-08-13 RX ADMIN — HYDRALAZINE HYDROCHLORIDE 25 MG: 25 TABLET, FILM COATED ORAL at 22:36

## 2023-08-13 RX ADMIN — SODIUM CHLORIDE: 9 INJECTION, SOLUTION INTRAVENOUS at 23:55

## 2023-08-13 RX ADMIN — POTASSIUM CHLORIDE 20 MEQ: 1500 TABLET, EXTENDED RELEASE ORAL at 09:31

## 2023-08-13 RX ADMIN — EMPAGLIFLOZIN 10 MG: 10 TABLET, FILM COATED ORAL at 09:32

## 2023-08-13 RX ADMIN — HYDRALAZINE HYDROCHLORIDE 25 MG: 25 TABLET, FILM COATED ORAL at 13:23

## 2023-08-13 RX ADMIN — DOXAZOSIN 2 MG: 2 TABLET ORAL at 09:32

## 2023-08-13 RX ADMIN — Medication 1 TABLET: at 09:32

## 2023-08-13 RX ADMIN — HYDRALAZINE HYDROCHLORIDE 25 MG: 25 TABLET, FILM COATED ORAL at 04:47

## 2023-08-13 RX ADMIN — FOLIC ACID 1 MG: 1 TABLET ORAL at 09:31

## 2023-08-13 RX ADMIN — ALOGLIPTIN 12.5 MG: 12.5 TABLET, FILM COATED ORAL at 09:32

## 2023-08-13 RX ADMIN — MEXILETINE HYDROCHLORIDE 300 MG: 150 CAPSULE ORAL at 09:31

## 2023-08-13 RX ADMIN — OXYCODONE 10 MG: 5 TABLET ORAL at 00:41

## 2023-08-13 RX ADMIN — Medication 100 MG: at 09:32

## 2023-08-13 RX ADMIN — OXYCODONE 10 MG: 5 TABLET ORAL at 09:23

## 2023-08-13 RX ADMIN — MEXILETINE HYDROCHLORIDE 300 MG: 150 CAPSULE ORAL at 20:41

## 2023-08-13 RX ADMIN — OXYCODONE 10 MG: 5 TABLET ORAL at 17:55

## 2023-08-13 ASSESSMENT — PAIN DESCRIPTION - ORIENTATION
ORIENTATION: LOWER;LEFT
ORIENTATION: RIGHT;LEFT;LOWER;MID
ORIENTATION: RIGHT;LEFT;MID;LOWER
ORIENTATION: LOWER

## 2023-08-13 ASSESSMENT — PAIN DESCRIPTION - LOCATION
LOCATION: NECK;BACK;LEG
LOCATION: BACK
LOCATION: BACK;NECK;LEG

## 2023-08-13 ASSESSMENT — PAIN SCALES - GENERAL
PAINLEVEL_OUTOF10: 9
PAINLEVEL_OUTOF10: 8
PAINLEVEL_OUTOF10: 9
PAINLEVEL_OUTOF10: 10

## 2023-08-13 ASSESSMENT — PAIN DESCRIPTION - DESCRIPTORS
DESCRIPTORS: DISCOMFORT;ACHING;STABBING;THROBBING
DESCRIPTORS: ACHING;STABBING;THROBBING
DESCRIPTORS: ACHING;DISCOMFORT;SQUEEZING

## 2023-08-13 NOTE — FLOWSHEET NOTE
08/13/23 1041   Safe Environment   Safety Measures Caregiver at bedside  (VN safety round complete.)

## 2023-08-14 ENCOUNTER — TELEPHONE (OUTPATIENT)
Dept: FAMILY MEDICINE CLINIC | Age: 58
End: 2023-08-14

## 2023-08-14 PROBLEM — G89.29 ACUTE EXACERBATION OF CHRONIC LOW BACK PAIN: Status: ACTIVE | Noted: 2023-08-14

## 2023-08-14 PROBLEM — M54.50 ACUTE EXACERBATION OF CHRONIC LOW BACK PAIN: Status: ACTIVE | Noted: 2023-08-14

## 2023-08-14 LAB
ANION GAP SERPL CALC-SCNC: 12 MEQ/L (ref 8–16)
BASOPHILS ABSOLUTE: 0 THOU/MM3 (ref 0–0.1)
BASOPHILS NFR BLD AUTO: 0.4 %
BUN SERPL-MCNC: 43 MG/DL (ref 7–22)
CALCIUM SERPL-MCNC: 7.9 MG/DL (ref 8.5–10.5)
CHLORIDE SERPL-SCNC: 108 MEQ/L (ref 98–111)
CO2 SERPL-SCNC: 19 MEQ/L (ref 23–33)
CREAT SERPL-MCNC: 3.2 MG/DL (ref 0.4–1.2)
DEPRECATED RDW RBC AUTO: 54.9 FL (ref 35–45)
EOSINOPHIL NFR BLD AUTO: 4.9 %
EOSINOPHILS ABSOLUTE: 0.2 THOU/MM3 (ref 0–0.4)
ERYTHROCYTE [DISTWIDTH] IN BLOOD BY AUTOMATED COUNT: 15 % (ref 11.5–14.5)
GFR SERPL CREATININE-BSD FRML MDRD: 22 ML/MIN/1.73M2
GLUCOSE BLD STRIP.AUTO-MCNC: 123 MG/DL (ref 70–108)
GLUCOSE BLD STRIP.AUTO-MCNC: 132 MG/DL (ref 70–108)
GLUCOSE BLD STRIP.AUTO-MCNC: 146 MG/DL (ref 70–108)
GLUCOSE BLD STRIP.AUTO-MCNC: 215 MG/DL (ref 70–108)
GLUCOSE SERPL-MCNC: 126 MG/DL (ref 70–108)
HCT VFR BLD AUTO: 24.8 % (ref 42–52)
HGB BLD-MCNC: 7.8 GM/DL (ref 14–18)
IMM GRANULOCYTES # BLD AUTO: 0.05 THOU/MM3 (ref 0–0.07)
IMM GRANULOCYTES NFR BLD AUTO: 1.1 %
INR PPP: 1.21 (ref 0.85–1.13)
LYMPHOCYTES ABSOLUTE: 0.5 THOU/MM3 (ref 1–4.8)
LYMPHOCYTES NFR BLD AUTO: 11.4 %
MCH RBC QN AUTO: 33.3 PG (ref 26–33)
MCHC RBC AUTO-ENTMCNC: 31.5 GM/DL (ref 32.2–35.5)
MCV RBC AUTO: 106 FL (ref 80–94)
MONOCYTES ABSOLUTE: 0.8 THOU/MM3 (ref 0.4–1.3)
MONOCYTES NFR BLD AUTO: 17.7 %
NEUTROPHILS NFR BLD AUTO: 64.5 %
NRBC BLD AUTO-RTO: 0 /100 WBC
PLATELET # BLD AUTO: 124 THOU/MM3 (ref 130–400)
PMV BLD AUTO: 10 FL (ref 9.4–12.4)
POTASSIUM SERPL-SCNC: 4.4 MEQ/L (ref 3.5–5.2)
RBC # BLD AUTO: 2.34 MILL/MM3 (ref 4.7–6.1)
SEGMENTED NEUTROPHILS ABSOLUTE COUNT: 3 THOU/MM3 (ref 1.8–7.7)
SODIUM SERPL-SCNC: 139 MEQ/L (ref 135–145)
WBC # BLD AUTO: 4.7 THOU/MM3 (ref 4.8–10.8)

## 2023-08-14 PROCEDURE — 97116 GAIT TRAINING THERAPY: CPT

## 2023-08-14 PROCEDURE — 85610 PROTHROMBIN TIME: CPT

## 2023-08-14 PROCEDURE — 99232 SBSQ HOSP IP/OBS MODERATE 35: CPT | Performed by: INTERNAL MEDICINE

## 2023-08-14 PROCEDURE — 6370000000 HC RX 637 (ALT 250 FOR IP)

## 2023-08-14 PROCEDURE — 80048 BASIC METABOLIC PNL TOTAL CA: CPT

## 2023-08-14 PROCEDURE — 82948 REAGENT STRIP/BLOOD GLUCOSE: CPT

## 2023-08-14 PROCEDURE — 36415 COLL VENOUS BLD VENIPUNCTURE: CPT

## 2023-08-14 PROCEDURE — 85025 COMPLETE CBC W/AUTO DIFF WBC: CPT

## 2023-08-14 PROCEDURE — 97110 THERAPEUTIC EXERCISES: CPT

## 2023-08-14 PROCEDURE — 1200000000 HC SEMI PRIVATE

## 2023-08-14 PROCEDURE — 6370000000 HC RX 637 (ALT 250 FOR IP): Performed by: NURSE PRACTITIONER

## 2023-08-14 PROCEDURE — 2500000003 HC RX 250 WO HCPCS: Performed by: INTERNAL MEDICINE

## 2023-08-14 PROCEDURE — 2580000003 HC RX 258

## 2023-08-14 RX ORDER — BUMETANIDE 0.25 MG/ML
1 INJECTION INTRAMUSCULAR; INTRAVENOUS 2 TIMES DAILY
Status: COMPLETED | OUTPATIENT
Start: 2023-08-14 | End: 2023-08-14

## 2023-08-14 RX ADMIN — ALOGLIPTIN 12.5 MG: 12.5 TABLET, FILM COATED ORAL at 08:07

## 2023-08-14 RX ADMIN — ALPRAZOLAM 0.5 MG: 0.5 TABLET ORAL at 22:47

## 2023-08-14 RX ADMIN — GABAPENTIN 100 MG: 100 CAPSULE ORAL at 06:34

## 2023-08-14 RX ADMIN — HYDRALAZINE HYDROCHLORIDE 25 MG: 25 TABLET, FILM COATED ORAL at 14:22

## 2023-08-14 RX ADMIN — GABAPENTIN 100 MG: 100 CAPSULE ORAL at 21:27

## 2023-08-14 RX ADMIN — GABAPENTIN 100 MG: 100 CAPSULE ORAL at 14:22

## 2023-08-14 RX ADMIN — OXYCODONE 10 MG: 5 TABLET ORAL at 15:09

## 2023-08-14 RX ADMIN — OXYCODONE 10 MG: 5 TABLET ORAL at 11:02

## 2023-08-14 RX ADMIN — OXYCODONE 10 MG: 5 TABLET ORAL at 23:50

## 2023-08-14 RX ADMIN — OXYCODONE 10 MG: 5 TABLET ORAL at 02:38

## 2023-08-14 RX ADMIN — EMPAGLIFLOZIN 10 MG: 10 TABLET, FILM COATED ORAL at 08:07

## 2023-08-14 RX ADMIN — Medication 100 MG: at 08:07

## 2023-08-14 RX ADMIN — METOPROLOL SUCCINATE 100 MG: 100 TABLET, EXTENDED RELEASE ORAL at 08:07

## 2023-08-14 RX ADMIN — Medication 1 TABLET: at 08:07

## 2023-08-14 RX ADMIN — ISOSORBIDE MONONITRATE 120 MG: 60 TABLET, EXTENDED RELEASE ORAL at 08:07

## 2023-08-14 RX ADMIN — POTASSIUM CHLORIDE 20 MEQ: 1500 TABLET, EXTENDED RELEASE ORAL at 08:07

## 2023-08-14 RX ADMIN — HYDRALAZINE HYDROCHLORIDE 25 MG: 25 TABLET, FILM COATED ORAL at 06:34

## 2023-08-14 RX ADMIN — BUMETANIDE 1 MG: 0.25 INJECTION INTRAMUSCULAR; INTRAVENOUS at 11:02

## 2023-08-14 RX ADMIN — MEXILETINE HYDROCHLORIDE 300 MG: 150 CAPSULE ORAL at 21:27

## 2023-08-14 RX ADMIN — MEXILETINE HYDROCHLORIDE 300 MG: 150 CAPSULE ORAL at 14:22

## 2023-08-14 RX ADMIN — AMIODARONE HYDROCHLORIDE 200 MG: 200 TABLET ORAL at 08:07

## 2023-08-14 RX ADMIN — HYDRALAZINE HYDROCHLORIDE 25 MG: 25 TABLET, FILM COATED ORAL at 21:27

## 2023-08-14 RX ADMIN — SODIUM CHLORIDE, PRESERVATIVE FREE 10 ML: 5 INJECTION INTRAVENOUS at 21:28

## 2023-08-14 RX ADMIN — FOLIC ACID 1 MG: 1 TABLET ORAL at 08:07

## 2023-08-14 RX ADMIN — MEXILETINE HYDROCHLORIDE 300 MG: 150 CAPSULE ORAL at 08:07

## 2023-08-14 RX ADMIN — OXYCODONE 10 MG: 5 TABLET ORAL at 19:41

## 2023-08-14 RX ADMIN — BUMETANIDE 1 MG: 0.25 INJECTION INTRAMUSCULAR; INTRAVENOUS at 21:26

## 2023-08-14 RX ADMIN — OXYCODONE 10 MG: 5 TABLET ORAL at 06:34

## 2023-08-14 RX ADMIN — DOXAZOSIN 2 MG: 2 TABLET ORAL at 08:07

## 2023-08-14 ASSESSMENT — PAIN DESCRIPTION - DESCRIPTORS
DESCRIPTORS: ACHING;SORE
DESCRIPTORS: ACHING;SORE
DESCRIPTORS: THROBBING;STABBING
DESCRIPTORS: ACHING;SORE
DESCRIPTORS: THROBBING;SHOOTING
DESCRIPTORS: THROBBING;STABBING
DESCRIPTORS: THROBBING;STABBING
DESCRIPTORS: ACHING
DESCRIPTORS: ACHING;DISCOMFORT

## 2023-08-14 ASSESSMENT — PAIN DESCRIPTION - LOCATION
LOCATION: BACK
LOCATION: BACK;LEG
LOCATION: BACK
LOCATION: BACK;LEG

## 2023-08-14 ASSESSMENT — PAIN - FUNCTIONAL ASSESSMENT
PAIN_FUNCTIONAL_ASSESSMENT: ACTIVITIES ARE NOT PREVENTED

## 2023-08-14 ASSESSMENT — PAIN SCALES - GENERAL
PAINLEVEL_OUTOF10: 9
PAINLEVEL_OUTOF10: 7
PAINLEVEL_OUTOF10: 7
PAINLEVEL_OUTOF10: 8
PAINLEVEL_OUTOF10: 7
PAINLEVEL_OUTOF10: 8

## 2023-08-14 ASSESSMENT — PAIN DESCRIPTION - ONSET
ONSET: ON-GOING
ONSET: ON-GOING

## 2023-08-14 ASSESSMENT — PAIN DESCRIPTION - ORIENTATION
ORIENTATION: LOWER

## 2023-08-14 ASSESSMENT — PAIN DESCRIPTION - PAIN TYPE
TYPE: CHRONIC PAIN
TYPE: CHRONIC PAIN

## 2023-08-14 ASSESSMENT — PAIN DESCRIPTION - DIRECTION
RADIATING_TOWARDS: BLE
RADIATING_TOWARDS: BILATERAL LEGS

## 2023-08-14 ASSESSMENT — PAIN DESCRIPTION - FREQUENCY
FREQUENCY: CONTINUOUS
FREQUENCY: CONTINUOUS

## 2023-08-14 NOTE — FLOWSHEET NOTE
08/14/23 1252   Safe Environment   Safety Measures Other (comment)  (Virtual Safety Round Completed)     Patient responds appropriately to vrn prompts. Patient does not consent to camera at this time. Denies any immediate needs. Fall prevention education reinforced.

## 2023-08-14 NOTE — TELEPHONE ENCOUNTER
Patient notified and understanding voiced. He states they need filled out before next week or he will not get paid. Aware need D/C date in order to fill out forms with possible RTW date.   He voices understanding

## 2023-08-14 NOTE — TELEPHONE ENCOUNTER
Yes.  But cannot complete until we have a tentative return to work date and will need to await discharge from hospital.

## 2023-08-14 NOTE — TELEPHONE ENCOUNTER
The patient called in and stated that he is currently admitted to [address] [place] [address] and is wondering if you received his [facility] paperwork for this admission?

## 2023-08-14 NOTE — CARE COORDINATION
8/14/23, 1:35 PM EDT    DISCHARGE ON GOING EVALUATION    Rain Reyes day: 5  Location: -01/001-A Reason for admit: BABAK (acute kidney injury) ([address]) [N17.9]  Hypertensive urgency [I16.0]  Acute exacerbation of chronic low back pain [M54.50, G89.29]  Fall, initial encounter [W19. XXXA]  Alcohol withdrawal syndrome without complication ([address]) [A70.701]  Spinal stenosis of lumbar region, unspecified whether neurogenic claudication present [M48.061]   Barriers to Discharge: creat 3.2, Ca 7.9, Hgb 7.8. IV bumex bid, lyte replacement, pain control. Pain management is not taking consults this week. Dr. Vinayak Pat is considering IR consult for injection. PCP: ISABELLA Sanz CNP  Readmission Risk Score: 23%  Patient Goals/Plan/Treatment Preferences: Home alone. Met with Shu Maldonado, he is working with [employer] Hare to have his time off covered at [employer]. He has instructed [facility] to fax paperwork to [facility] fax machine and requests this CM forward it on to Alicia Canela NP. Awaiting fax at this time.

## 2023-08-15 ENCOUNTER — APPOINTMENT (OUTPATIENT)
Dept: INTERVENTIONAL RADIOLOGY/VASCULAR | Age: 58
DRG: 551 | End: 2023-08-15
Payer: COMMERCIAL

## 2023-08-15 LAB
ANION GAP SERPL CALC-SCNC: 10 MEQ/L (ref 8–16)
BUN SERPL-MCNC: 40 MG/DL (ref 7–22)
CALCIUM SERPL-MCNC: 8.4 MG/DL (ref 8.5–10.5)
CHLORIDE SERPL-SCNC: 105 MEQ/L (ref 98–111)
CO2 SERPL-SCNC: 21 MEQ/L (ref 23–33)
CREAT SERPL-MCNC: 3.2 MG/DL (ref 0.4–1.2)
GFR SERPL CREATININE-BSD FRML MDRD: 22 ML/MIN/1.73M2
GLUCOSE BLD STRIP.AUTO-MCNC: 133 MG/DL (ref 70–108)
GLUCOSE BLD STRIP.AUTO-MCNC: 144 MG/DL (ref 70–108)
GLUCOSE BLD STRIP.AUTO-MCNC: 153 MG/DL (ref 70–108)
GLUCOSE BLD STRIP.AUTO-MCNC: 237 MG/DL (ref 70–108)
GLUCOSE BLD STRIP.AUTO-MCNC: 322 MG/DL (ref 70–108)
GLUCOSE BLD STRIP.AUTO-MCNC: 328 MG/DL (ref 70–108)
GLUCOSE SERPL-MCNC: 108 MG/DL (ref 70–108)
INR PPP: 1.09 (ref 0.85–1.13)
POTASSIUM SERPL-SCNC: 4.4 MEQ/L (ref 3.5–5.2)
SODIUM SERPL-SCNC: 136 MEQ/L (ref 135–145)

## 2023-08-15 PROCEDURE — 6370000000 HC RX 637 (ALT 250 FOR IP)

## 2023-08-15 PROCEDURE — 1200000000 HC SEMI PRIVATE

## 2023-08-15 PROCEDURE — 82948 REAGENT STRIP/BLOOD GLUCOSE: CPT

## 2023-08-15 PROCEDURE — 2709999900 IR FLUORO GUIDED LUMBAR PUNCTURE THERAPY

## 2023-08-15 PROCEDURE — 99233 SBSQ HOSP IP/OBS HIGH 50: CPT | Performed by: INTERNAL MEDICINE

## 2023-08-15 PROCEDURE — 36415 COLL VENOUS BLD VENIPUNCTURE: CPT

## 2023-08-15 PROCEDURE — 99232 SBSQ HOSP IP/OBS MODERATE 35: CPT | Performed by: INTERNAL MEDICINE

## 2023-08-15 PROCEDURE — 6360000002 HC RX W HCPCS: Performed by: RADIOLOGY

## 2023-08-15 PROCEDURE — 2500000003 HC RX 250 WO HCPCS: Performed by: INTERNAL MEDICINE

## 2023-08-15 PROCEDURE — 3E0R3BZ INTRODUCTION OF ANESTHETIC AGENT INTO SPINAL CANAL, PERCUTANEOUS APPROACH: ICD-10-PCS | Performed by: RADIOLOGY

## 2023-08-15 PROCEDURE — 80048 BASIC METABOLIC PNL TOTAL CA: CPT

## 2023-08-15 PROCEDURE — 6360000004 HC RX CONTRAST MEDICATION: Performed by: RADIOLOGY

## 2023-08-15 PROCEDURE — 3E0R33Z INTRODUCTION OF ANTI-INFLAMMATORY INTO SPINAL CANAL, PERCUTANEOUS APPROACH: ICD-10-PCS | Performed by: RADIOLOGY

## 2023-08-15 PROCEDURE — 85610 PROTHROMBIN TIME: CPT

## 2023-08-15 PROCEDURE — 2580000003 HC RX 258

## 2023-08-15 PROCEDURE — 62323 NJX INTERLAMINAR LMBR/SAC: CPT

## 2023-08-15 PROCEDURE — 6370000000 HC RX 637 (ALT 250 FOR IP): Performed by: NURSE PRACTITIONER

## 2023-08-15 RX ORDER — DEXAMETHASONE SODIUM PHOSPHATE 4 MG/ML
4 INJECTION, SOLUTION INTRA-ARTICULAR; INTRALESIONAL; INTRAMUSCULAR; INTRAVENOUS; SOFT TISSUE ONCE
Status: COMPLETED | OUTPATIENT
Start: 2023-08-15 | End: 2023-08-15

## 2023-08-15 RX ORDER — BUMETANIDE 0.25 MG/ML
1 INJECTION INTRAMUSCULAR; INTRAVENOUS 2 TIMES DAILY
Status: COMPLETED | OUTPATIENT
Start: 2023-08-15 | End: 2023-08-16

## 2023-08-15 RX ORDER — BUPIVACAINE HYDROCHLORIDE 2.5 MG/ML
2 INJECTION, SOLUTION EPIDURAL; INFILTRATION; INTRACAUDAL ONCE
Status: COMPLETED | OUTPATIENT
Start: 2023-08-15 | End: 2023-08-15

## 2023-08-15 RX ADMIN — OXYCODONE 10 MG: 5 TABLET ORAL at 08:18

## 2023-08-15 RX ADMIN — OXYCODONE 10 MG: 5 TABLET ORAL at 04:17

## 2023-08-15 RX ADMIN — Medication 1 TABLET: at 08:09

## 2023-08-15 RX ADMIN — GABAPENTIN 100 MG: 100 CAPSULE ORAL at 21:36

## 2023-08-15 RX ADMIN — INSULIN LISPRO 4 UNITS: 100 INJECTION, SOLUTION INTRAVENOUS; SUBCUTANEOUS at 21:48

## 2023-08-15 RX ADMIN — HYDRALAZINE HYDROCHLORIDE 25 MG: 25 TABLET, FILM COATED ORAL at 14:03

## 2023-08-15 RX ADMIN — IOPAMIDOL 1 ML: 408 INJECTION, SOLUTION INTRATHECAL at 15:17

## 2023-08-15 RX ADMIN — POLYETHYLENE GLYCOL 3350 17 G: 17 POWDER, FOR SOLUTION ORAL at 20:47

## 2023-08-15 RX ADMIN — EMPAGLIFLOZIN 10 MG: 10 TABLET, FILM COATED ORAL at 08:09

## 2023-08-15 RX ADMIN — ALOGLIPTIN 12.5 MG: 12.5 TABLET, FILM COATED ORAL at 08:08

## 2023-08-15 RX ADMIN — OXYCODONE 10 MG: 5 TABLET ORAL at 21:35

## 2023-08-15 RX ADMIN — GABAPENTIN 100 MG: 100 CAPSULE ORAL at 05:19

## 2023-08-15 RX ADMIN — MEXILETINE HYDROCHLORIDE 300 MG: 150 CAPSULE ORAL at 08:08

## 2023-08-15 RX ADMIN — AMIODARONE HYDROCHLORIDE 200 MG: 200 TABLET ORAL at 08:09

## 2023-08-15 RX ADMIN — MEXILETINE HYDROCHLORIDE 300 MG: 150 CAPSULE ORAL at 14:03

## 2023-08-15 RX ADMIN — HYDRALAZINE HYDROCHLORIDE 25 MG: 25 TABLET, FILM COATED ORAL at 05:19

## 2023-08-15 RX ADMIN — SODIUM CHLORIDE, PRESERVATIVE FREE 10 ML: 5 INJECTION INTRAVENOUS at 21:36

## 2023-08-15 RX ADMIN — Medication 100 MG: at 08:08

## 2023-08-15 RX ADMIN — BUPIVACAINE HYDROCHLORIDE 2 ML: 2.5 INJECTION, SOLUTION EPIDURAL; INFILTRATION; INTRACAUDAL; PERINEURAL at 15:17

## 2023-08-15 RX ADMIN — BUMETANIDE 1 MG: 0.25 INJECTION INTRAMUSCULAR; INTRAVENOUS at 14:02

## 2023-08-15 RX ADMIN — DOXAZOSIN 2 MG: 2 TABLET ORAL at 08:09

## 2023-08-15 RX ADMIN — FOLIC ACID 1 MG: 1 TABLET ORAL at 08:09

## 2023-08-15 RX ADMIN — OXYCODONE 10 MG: 5 TABLET ORAL at 17:29

## 2023-08-15 RX ADMIN — METOPROLOL SUCCINATE 100 MG: 100 TABLET, EXTENDED RELEASE ORAL at 08:08

## 2023-08-15 RX ADMIN — SODIUM CHLORIDE, PRESERVATIVE FREE 10 ML: 5 INJECTION INTRAVENOUS at 08:09

## 2023-08-15 RX ADMIN — GABAPENTIN 100 MG: 100 CAPSULE ORAL at 14:03

## 2023-08-15 RX ADMIN — ISOSORBIDE MONONITRATE 120 MG: 60 TABLET, EXTENDED RELEASE ORAL at 08:08

## 2023-08-15 RX ADMIN — OXYCODONE 10 MG: 5 TABLET ORAL at 13:19

## 2023-08-15 RX ADMIN — POTASSIUM CHLORIDE 20 MEQ: 1500 TABLET, EXTENDED RELEASE ORAL at 08:08

## 2023-08-15 RX ADMIN — HYDRALAZINE HYDROCHLORIDE 25 MG: 25 TABLET, FILM COATED ORAL at 21:36

## 2023-08-15 RX ADMIN — DEXAMETHASONE SODIUM PHOSPHATE 4 MG: 4 INJECTION, SOLUTION INTRAMUSCULAR; INTRAVENOUS at 15:17

## 2023-08-15 RX ADMIN — MEXILETINE HYDROCHLORIDE 300 MG: 150 CAPSULE ORAL at 21:36

## 2023-08-15 ASSESSMENT — PAIN SCALES - GENERAL
PAINLEVEL_OUTOF10: 8
PAINLEVEL_OUTOF10: 7
PAINLEVEL_OUTOF10: 8
PAINLEVEL_OUTOF10: 9
PAINLEVEL_OUTOF10: 8
PAINLEVEL_OUTOF10: 8
PAINLEVEL_OUTOF10: 9

## 2023-08-15 ASSESSMENT — PAIN DESCRIPTION - LOCATION
LOCATION: BACK

## 2023-08-15 ASSESSMENT — PAIN - FUNCTIONAL ASSESSMENT: PAIN_FUNCTIONAL_ASSESSMENT: ACTIVITIES ARE NOT PREVENTED

## 2023-08-15 ASSESSMENT — PAIN DESCRIPTION - ORIENTATION
ORIENTATION: LOWER

## 2023-08-15 ASSESSMENT — PAIN DESCRIPTION - DESCRIPTORS
DESCRIPTORS: ACHING;SORE
DESCRIPTORS: THROBBING
DESCRIPTORS: ACHING;TENDER
DESCRIPTORS: ACHING;THROBBING
DESCRIPTORS: ACHING

## 2023-08-15 NOTE — OP NOTE
Department of Radiology  Post Procedure Progress Note    Pre-Procedure Diagnosis:  Lumbar radiculopathy     Procedure Performed:  Epidural block/steroid injection      Anesthesia: local     Findings: successful    Immediate Complications:  None    Estimated Blood Loss: minimal    SEE DICTATED PROCEDURE NOTE FOR COMPLETE DETAILS.       Electronically signed by Andrews Mcnamara MD on 8/15/2023 at 3:19 PM

## 2023-08-15 NOTE — CARE COORDINATION
8/15/23, 8:12 AM EDT    DISCHARGE  [address] day: 6  Location: -01/001-A Reason for admit: BABAK (acute kidney injury) ([address] ) [N17.9]  Hypertensive urgency [I16.0]  Acute exacerbation of chronic low back pain [M54.50, G89.29]  Fall, initial encounter [W19. XXXA]  Alcohol withdrawal syndrome without complication ([address] ) [Q06.039]  Spinal stenosis of lumbar region, unspecified whether neurogenic claudication present [M48.061]   Procedure:   8/7 MRI Lumbar:   1. Degenerative changes resulting in mild-to-moderate canal and moderate severe bilateral foraminal stenosis at L1-2.  2. There is mild canal and moderate severe bilateral foraminal stenosis at L3-4.  3. There is mild canal and moderate bilateral foraminal stenosis at L4-5.  4. There is mild canal stenosis and mild-to-moderate bilateral foraminal stenosis at T12-L1. 5. There is mild canal, moderate severe right and moderate left-sided foraminal stenosis at L2-3.  6. There is spondylolysis on the right side at L5.  7. There is a small intradural defect behind L5-S1 larger than on remote study dated 4/10/2018. This may represent an intradural schwannoma. 8/7 MRI Thoracic: Small disc protrusions at T7-8 and T9-10 resulting in mild canal stenosis with no cord compression at either level. 8/15 Planned Interventional Radiology procedure of steroid injection to lumbar. Barriers to Discharge: Hospitalist, Nephrology, Ortho Spine and Pain Management following. Pain Management recommending IR consult (see above). PCP: ISABELLA rFy CNP  Readmission Risk Score: 21.7%  Patient Goals/Plan/Treatment Preferences: Pt is from home alone. Monitor for needs.

## 2023-08-15 NOTE — H&P
Formulation and discussion of sedation / procedure plans, risks, benefits, side effects and alternatives with patient and/or responsible adult completed.     Electronically signed by Ariana Martinez MD on 8/15/2023 at 3:19 PM

## 2023-08-16 VITALS
RESPIRATION RATE: 16 BRPM | SYSTOLIC BLOOD PRESSURE: 185 MMHG | HEIGHT: 74 IN | OXYGEN SATURATION: 99 % | BODY MASS INDEX: 34.01 KG/M2 | DIASTOLIC BLOOD PRESSURE: 87 MMHG | TEMPERATURE: 98.2 F | HEART RATE: 59 BPM | WEIGHT: 265 LBS

## 2023-08-16 LAB
ANION GAP SERPL CALC-SCNC: 12 MEQ/L (ref 8–16)
BUN SERPL-MCNC: 40 MG/DL (ref 7–22)
CALCIUM SERPL-MCNC: 8.7 MG/DL (ref 8.5–10.5)
CHLORIDE SERPL-SCNC: 103 MEQ/L (ref 98–111)
CO2 SERPL-SCNC: 21 MEQ/L (ref 23–33)
CREAT SERPL-MCNC: 3.4 MG/DL (ref 0.4–1.2)
GFR SERPL CREATININE-BSD FRML MDRD: 20 ML/MIN/1.73M2
GLUCOSE BLD STRIP.AUTO-MCNC: 130 MG/DL (ref 70–108)
GLUCOSE BLD STRIP.AUTO-MCNC: 209 MG/DL (ref 70–108)
GLUCOSE SERPL-MCNC: 174 MG/DL (ref 70–108)
INR PPP: 1 (ref 0.85–1.13)
POTASSIUM SERPL-SCNC: 4.8 MEQ/L (ref 3.5–5.2)
SODIUM SERPL-SCNC: 136 MEQ/L (ref 135–145)

## 2023-08-16 PROCEDURE — 6370000000 HC RX 637 (ALT 250 FOR IP): Performed by: NURSE PRACTITIONER

## 2023-08-16 PROCEDURE — 6370000000 HC RX 637 (ALT 250 FOR IP)

## 2023-08-16 PROCEDURE — 85610 PROTHROMBIN TIME: CPT

## 2023-08-16 PROCEDURE — 2580000003 HC RX 258

## 2023-08-16 PROCEDURE — 97116 GAIT TRAINING THERAPY: CPT

## 2023-08-16 PROCEDURE — 97530 THERAPEUTIC ACTIVITIES: CPT

## 2023-08-16 PROCEDURE — 99232 SBSQ HOSP IP/OBS MODERATE 35: CPT | Performed by: INTERNAL MEDICINE

## 2023-08-16 PROCEDURE — 80048 BASIC METABOLIC PNL TOTAL CA: CPT

## 2023-08-16 PROCEDURE — 82948 REAGENT STRIP/BLOOD GLUCOSE: CPT

## 2023-08-16 PROCEDURE — 2500000003 HC RX 250 WO HCPCS: Performed by: INTERNAL MEDICINE

## 2023-08-16 PROCEDURE — 36415 COLL VENOUS BLD VENIPUNCTURE: CPT

## 2023-08-16 RX ORDER — OXYCODONE HYDROCHLORIDE 5 MG/1
5 TABLET ORAL EVERY 6 HOURS PRN
Qty: 10 TABLET | Refills: 0 | Status: SHIPPED | OUTPATIENT
Start: 2023-08-16 | End: 2023-08-19

## 2023-08-16 RX ORDER — HYDRALAZINE HYDROCHLORIDE 50 MG/1
50 TABLET, FILM COATED ORAL 3 TIMES DAILY
Qty: 90 TABLET | Refills: 1 | Status: SHIPPED | OUTPATIENT
Start: 2023-08-16 | End: 2023-10-15

## 2023-08-16 RX ORDER — MULTIVITAMIN WITH IRON
1 TABLET ORAL DAILY
Qty: 30 TABLET | Refills: 0 | Status: SHIPPED | OUTPATIENT
Start: 2023-08-17 | End: 2023-09-16

## 2023-08-16 RX ORDER — INSULIN GLARGINE 100 [IU]/ML
10 INJECTION, SOLUTION SUBCUTANEOUS NIGHTLY
COMMUNITY
Start: 2023-08-16

## 2023-08-16 RX ORDER — FOLIC ACID 1 MG/1
1 TABLET ORAL DAILY
Qty: 30 TABLET | Refills: 3 | Status: SHIPPED | OUTPATIENT
Start: 2023-08-17

## 2023-08-16 RX ORDER — LANOLIN ALCOHOL/MO/W.PET/CERES
100 CREAM (GRAM) TOPICAL DAILY
Qty: 30 TABLET | Refills: 3 | Status: SHIPPED | OUTPATIENT
Start: 2023-08-17

## 2023-08-16 RX ADMIN — EMPAGLIFLOZIN 10 MG: 10 TABLET, FILM COATED ORAL at 08:59

## 2023-08-16 RX ADMIN — HYDRALAZINE HYDROCHLORIDE 25 MG: 25 TABLET, FILM COATED ORAL at 13:45

## 2023-08-16 RX ADMIN — AMIODARONE HYDROCHLORIDE 200 MG: 200 TABLET ORAL at 09:00

## 2023-08-16 RX ADMIN — Medication 100 MG: at 08:59

## 2023-08-16 RX ADMIN — GABAPENTIN 100 MG: 100 CAPSULE ORAL at 13:45

## 2023-08-16 RX ADMIN — ALOGLIPTIN 12.5 MG: 12.5 TABLET, FILM COATED ORAL at 09:02

## 2023-08-16 RX ADMIN — BUMETANIDE 1 MG: 0.25 INJECTION INTRAMUSCULAR; INTRAVENOUS at 09:01

## 2023-08-16 RX ADMIN — INSULIN LISPRO 2 UNITS: 100 INJECTION, SOLUTION INTRAVENOUS; SUBCUTANEOUS at 09:00

## 2023-08-16 RX ADMIN — MEXILETINE HYDROCHLORIDE 300 MG: 150 CAPSULE ORAL at 08:58

## 2023-08-16 RX ADMIN — POTASSIUM CHLORIDE 20 MEQ: 1500 TABLET, EXTENDED RELEASE ORAL at 08:59

## 2023-08-16 RX ADMIN — GABAPENTIN 100 MG: 100 CAPSULE ORAL at 06:12

## 2023-08-16 RX ADMIN — OXYCODONE 10 MG: 5 TABLET ORAL at 02:42

## 2023-08-16 RX ADMIN — SODIUM CHLORIDE, PRESERVATIVE FREE 10 ML: 5 INJECTION INTRAVENOUS at 09:01

## 2023-08-16 RX ADMIN — ISOSORBIDE MONONITRATE 120 MG: 60 TABLET, EXTENDED RELEASE ORAL at 08:59

## 2023-08-16 RX ADMIN — FOLIC ACID 1 MG: 1 TABLET ORAL at 08:59

## 2023-08-16 RX ADMIN — DOXAZOSIN 2 MG: 2 TABLET ORAL at 08:59

## 2023-08-16 RX ADMIN — OXYCODONE 10 MG: 5 TABLET ORAL at 06:45

## 2023-08-16 RX ADMIN — OXYCODONE 10 MG: 5 TABLET ORAL at 10:43

## 2023-08-16 RX ADMIN — HYDRALAZINE HYDROCHLORIDE 25 MG: 25 TABLET, FILM COATED ORAL at 06:12

## 2023-08-16 RX ADMIN — ALPRAZOLAM 0.5 MG: 0.5 TABLET ORAL at 00:00

## 2023-08-16 RX ADMIN — Medication 1 TABLET: at 08:59

## 2023-08-16 RX ADMIN — OXYCODONE 10 MG: 5 TABLET ORAL at 14:54

## 2023-08-16 RX ADMIN — METOPROLOL SUCCINATE 100 MG: 100 TABLET, EXTENDED RELEASE ORAL at 08:59

## 2023-08-16 ASSESSMENT — PAIN DESCRIPTION - DESCRIPTORS
DESCRIPTORS: ACHING

## 2023-08-16 ASSESSMENT — PAIN DESCRIPTION - LOCATION
LOCATION: BACK
LOCATION: LEG

## 2023-08-16 ASSESSMENT — PAIN SCALES - GENERAL
PAINLEVEL_OUTOF10: 8
PAINLEVEL_OUTOF10: 7
PAINLEVEL_OUTOF10: 8
PAINLEVEL_OUTOF10: 8
PAINLEVEL_OUTOF10: 7

## 2023-08-16 ASSESSMENT — PAIN DESCRIPTION - ORIENTATION: ORIENTATION: LOWER

## 2023-08-16 NOTE — FLOWSHEET NOTE
08/16/23 [facility]   Safe Environment   Safety Measures   (Virtual Nurse Safety Round Complete)     Call placed to patients room, patient did not respond to audio, video turned on for safety purposes. Camera is blocked or covered by something, bedside rn called and notified, bsrn stated patient is very particular about the camera and did it purposely, vn asked if bsrn would like us to discontinue virtual nursing serviced which she agreed.

## 2023-08-16 NOTE — DISCHARGE INSTRUCTIONS
Recommendations for discharge:   Date Warfarin Dose   8/16/23 7.5 mg   8/17/23 7.5 mg   8/18/23 2.5 mg   8/19/23 5 mg   8/20/23 5 mg   8/21/23 INR      Provider dosing warfarin: [facility]     Recheck INR:  8/21/23 @ 2:15 pm with results to [address]

## 2023-08-16 NOTE — CARE COORDINATION
8/16/23, 11:52 AM EDT    Patient goals/plan/ treatment preferences discussed by  and . Patient goals/plan/ treatment preferences reviewed with patient/ family. Patient/ family verbalize understanding of discharge plan and are in agreement with goal/plan/treatment preferences. Understanding was demonstrated using the teach back method. AVS provided by RN at time of discharge, which includes all necessary medical information pertaining to the patients current course of illness, treatment, post-discharge goals of care, and treatment preferences. Services At/After Discharge: None           Pt is being discharged home today, denies needs.

## 2023-08-16 NOTE — PLAN OF CARE
Problem: Discharge Planning  Goal: Discharge to home or other facility with appropriate resources  8/14/2023 0215 by Netta Byers RN  Outcome: Progressing  Flowsheets (Taken 8/14/2023 0215)  Discharge to home or other facility with appropriate resources:   Identify barriers to discharge with patient and caregiver   Arrange for needed discharge resources and transportation as appropriate  Note: Patient plans to discharge home. 8/13/2023 1502 by Susan Del Real RN  Outcome: Progressing  Flowsheets (Taken 8/13/2023 0830)  Discharge to home or other facility with appropriate resources: Identify barriers to discharge with patient and caregiver     Problem: Safety - Adult  Goal: Free from fall injury  8/14/2023 0215 by Netta Byers RN  Outcome: Progressing  Flowsheets (Taken 8/14/2023 0215)  Free From Fall Injury: Instruct family/caregiver on patient safety  Note: Patient remains free from falls during shift. Call light within reach. Side rails up x2. Bed in lowest position and bed alarm on. Non skid slippers applied. 8/13/2023 1502 by Susan Del Real RN  Outcome: Progressing  Flowsheets (Taken 8/13/2023 0830)  Free From Fall Injury: Instruct family/caregiver on patient safety     Problem: Pain  Goal: Verbalizes/displays adequate comfort level or baseline comfort level  8/14/2023 0215 by Netta Byers RN  Outcome: Progressing  Flowsheets (Taken 8/14/2023 0215)  Verbalizes/displays adequate comfort level or baseline comfort level:   Administer analgesics based on type and severity of pain and evaluate response   Assess pain using appropriate pain scale   Encourage patient to monitor pain and request assistance  Note: Pain rated on 0-10 pain rating scale. PRN pain medications given as indicated per [facility] Will continue to reassess.      8/13/2023 1502 by Susan Del Real RN  Outcome: Progressing     Problem: ABCDS Injury Assessment  Goal: Absence of physical injury  8/14/2023 0215 by Netta Byers RN  Outcome:
Problem: Discharge Planning  Goal: Discharge to home or other facility with appropriate resources  8/15/2023 1129 by Kyle Story RN  Outcome: Progressing  Flowsheets (Taken 8/15/2023 1129)  Discharge to home or other facility with appropriate resources:   Identify barriers to discharge with patient and caregiver   Arrange for needed discharge resources and transportation as appropriate   Identify discharge learning needs (meds, wound care, etc)  Note: Patient plans to discharge home when medically ready. 8/14/2023 2218 by Justice Zelaya RN  Outcome: Progressing  Flowsheets (Taken 8/14/2023 2029)  Discharge to home or other facility with appropriate resources: Identify barriers to discharge with patient and caregiver     Problem: Safety - Adult  Goal: Free from fall injury  8/15/2023 1129 by Kyle Story RN  Outcome: Progressing  Flowsheets (Taken 8/15/2023 1129)  Free From Fall Injury:   Manoj Smith family/caregiver on patient safety   Based on caregiver fall risk screen, instruct family/caregiver to ask for assistance with transferring infant if caregiver noted to have fall risk factors  Note: No falls noted this shift. Continue falling star program. Bed alarm on, bed in low position. Call light and personal belongings in reach. Patient uses call light appropriately. 8/14/2023 2218 by Justice Zelaya RN  Outcome: Progressing     Problem: Pain  Goal: Verbalizes/displays adequate comfort level or baseline comfort level  8/15/2023 1129 by Kyle Story RN  Outcome: Progressing  Flowsheets (Taken 8/15/2023 1129)  Verbalizes/displays adequate comfort level or baseline comfort level:   Encourage patient to monitor pain and request assistance   Administer analgesics based on type and severity of pain and evaluate response   Implement non-pharmacological measures as appropriate and evaluate response   Assess pain using appropriate pain scale  Note: Patient complaining of pain this shift.  PRN pain medication
Problem: Discharge Planning  Goal: Discharge to home or other facility with appropriate resources  8/16/2023 1326 by Alessio Rodriguez RN  Outcome: Completed  8/16/2023 0311 by Silvia Ruiz RN  Flowsheets (Taken 8/16/2023 9360)  Discharge to home or other facility with appropriate resources:   Identify discharge learning needs (meds, wound care, etc)   Arrange for needed discharge resources and transportation as appropriate   Identify barriers to discharge with patient and caregiver     Problem: Safety - Adult  Goal: Free from fall injury  8/16/2023 1326 by Alessio Rodriguez RN  Outcome: Completed  8/16/2023 0311 by Silvia Ruiz RN  [address] (Taken 8/15/2023 1129 by Maxime Weir, RN)  Free From Fall Injury:   Instruct family/caregiver on patient safety   Based on caregiver fall risk screen, instruct family/caregiver to ask for assistance with transferring infant if caregiver noted to have fall risk factors     Problem: Pain  Goal: Verbalizes/displays adequate comfort level or baseline comfort level  8/16/2023 1326 by Alessio Rodriguez RN  Outcome: Completed  8/16/2023 0311 by Silvia Ruiz RN  Flowsheets (Taken 8/15/2023 1129 by Maxime Weir, RN)  Verbalizes/displays adequate comfort level or baseline comfort level:   Encourage patient to monitor pain and request assistance   Administer analgesics based on type and severity of pain and evaluate response   Implement non-pharmacological measures as appropriate and evaluate response   Assess pain using appropriate pain scale     Problem: ABCDS Injury Assessment  Goal: Absence of physical injury  Outcome: Completed     Problem: Chronic Conditions and Co-morbidities  Goal: Patient's chronic conditions and co-morbidity symptoms are monitored and maintained or improved  Outcome: Completed     Problem: Cardiovascular - Adult  Goal: Maintains optimal cardiac output and hemodynamic stability  Outcome: Completed  Goal: Absence of cardiac dysrhythmias or at
Problem: Discharge Planning  Goal: Discharge to home or other facility with appropriate resources  8/8/2023 1333 by Azalea Harden RN  Outcome: Progressing  Flowsheets (Taken 8/8/2023 1245)  Discharge to home or other facility with appropriate resources:   Identify barriers to discharge with patient and caregiver   Arrange for needed discharge resources and transportation as appropriate   Identify discharge learning needs (meds, wound care, etc)   Refer to discharge planning if patient needs post-hospital services based on physician order or complex needs related to functional status, cognitive ability or social support system  8/7/2023 2350 by Everette Gr RN  Outcome: Progressing  [address] (Taken 8/7/2023 2014)  Discharge to home or other facility with appropriate resources: Identify barriers to discharge with patient and caregiver     Problem: Safety - Adult  Goal: Free from fall injury  8/8/2023 1333 by Azalea Harden RN  Outcome: Progressing  8/7/2023 2350 by Everette Gr RN  Outcome: Progressing     Problem: Pain  Goal: Verbalizes/displays adequate comfort level or baseline comfort level  8/8/2023 1333 by Azalea Harden RN  Outcome: Progressing  8/7/2023 2350 by Everette Gr RN  Outcome: Progressing  Flowsheets (Taken 8/7/2023 1949)  Verbalizes/displays adequate comfort level or baseline comfort level: Encourage patient to monitor pain and request assistance     Problem: ABCDS Injury Assessment  Goal: Absence of physical injury  8/8/2023 1333 by Azalea Harden RN  Outcome: Progressing  8/7/2023 2350 by Everette Gr RN  Outcome: Progressing     Problem: Chronic Conditions and Co-morbidities  Goal: Patient's chronic conditions and co-morbidity symptoms are monitored and maintained or improved  Outcome: Progressing  Flowsheets (Taken 8/8/2023 1245)  Care Plan - Patient's Chronic Conditions and Co-Morbidity Symptoms are Monitored and Maintained or Improved:   Monitor and assess
Problem: Discharge Planning  Goal: Discharge to home or other facility with appropriate resources  8/8/2023 2235 by Mary Mariscal RN  Outcome: Progressing  Flowsheets (Taken 8/8/2023 1245 by Chantal German RN)  Discharge to home or other facility with appropriate resources:   Identify barriers to discharge with patient and caregiver   Arrange for needed discharge resources and transportation as appropriate   Identify discharge learning needs (meds, wound care, etc)   Refer to discharge planning if patient needs post-hospital services based on physician order or complex needs related to functional status, cognitive ability or social support system  8/8/2023 1333 by Chantal German RN  Outcome: Progressing  Flowsheets (Taken 8/8/2023 1245)  Discharge to home or other facility with appropriate resources:   Identify barriers to discharge with patient and caregiver   Arrange for needed discharge resources and transportation as appropriate   Identify discharge learning needs (meds, wound care, etc)   Refer to discharge planning if patient needs post-hospital services based on physician order or complex needs related to functional status, cognitive ability or social support system     Problem: Safety - Adult  Goal: Free from fall injury  8/8/2023 2235 by Mary Mariscal RN  Outcome: Progressing  Flowsheets (Taken 8/8/2023 2235)  Free From Fall Injury: Instruct family/caregiver on patient safety  8/8/2023 1333 by Chantal German RN  Outcome: Progressing     Problem: Pain  Goal: Verbalizes/displays adequate comfort level or baseline comfort level  8/8/2023 2235 by Mary Mariscal RN  Outcome: Progressing  Flowsheets (Taken 8/7/2023 1949 by Margie Macias RN)  Verbalizes/displays adequate comfort level or baseline comfort level: Encourage patient to monitor pain and request assistance  8/8/2023 1333 by Chantal German RN  Outcome: Progressing     Problem: ABCDS Injury Assessment  Goal: Absence of
Problem: Discharge Planning  Goal: Discharge to home or other facility with appropriate resources  Flowsheets (Taken 8/16/2023 0311)  Discharge to home or other facility with appropriate resources:   Identify discharge learning needs (meds, wound care, etc)   Arrange for needed discharge resources and transportation as appropriate   Identify barriers to discharge with patient and caregiver     Problem: Safety - Adult  Goal: Free from fall injury  Flowsheets (Taken 8/15/2023 1129 by Jesica Granados RN)  Free From Fall Injury:   Instruct family/caregiver on patient safety   Based on caregiver fall risk screen, instruct family/caregiver to ask for assistance with transferring infant if caregiver noted to have fall risk factors     Problem: Pain  Goal: Verbalizes/displays adequate comfort level or baseline comfort level  Flowsheets (Taken 8/15/2023 1129 by Jesica Granados RN)  Verbalizes/displays adequate comfort level or baseline comfort level:   Encourage patient to monitor pain and request assistance   Administer analgesics based on type and severity of pain and evaluate response   Implement non-pharmacological measures as appropriate and evaluate response   Assess pain using appropriate pain scale
Problem: Discharge Planning  Goal: Discharge to home or other facility with appropriate resources  Outcome: Progressing  Flowsheets (Taken 8/12/2023 2000)  Discharge to home or other facility with appropriate resources:   Identify barriers to discharge with patient and caregiver   Arrange for needed discharge resources and transportation as appropriate   Identify discharge learning needs (meds, wound care, etc)   Arrange for interpreters to assist at discharge as needed   Refer to discharge planning if patient needs post-hospital services based on physician order or complex needs related to functional status, cognitive ability or social support system     Problem: Safety - Adult  Goal: Free from fall injury  Outcome: Progressing  Flowsheets (Taken 8/12/2023 2305)  Free From Fall Injury: Instruct family/caregiver on patient safety     Problem: Pain  Goal: Verbalizes/displays adequate comfort level or baseline comfort level  Outcome: Progressing  Flowsheets (Taken 8/10/2023 0830 by Melita Vaca RN)  Verbalizes/displays adequate comfort level or baseline comfort level: Encourage patient to monitor pain and request assistance     Problem: ABCDS Injury Assessment  Goal: Absence of physical injury  Outcome: Progressing  Flowsheets (Taken 8/12/2023 2305)  Absence of Physical Injury: Implement safety measures based on patient assessment     Problem: Chronic Conditions and Co-morbidities  Goal: Patient's chronic conditions and co-morbidity symptoms are monitored and maintained or improved  Outcome: Progressing  Flowsheets (Taken 8/12/2023 2000)  Care Plan - Patient's Chronic Conditions and Co-Morbidity Symptoms are Monitored and Maintained or Improved:   Monitor and assess patient's chronic conditions and comorbid symptoms for stability, deterioration, or improvement   Collaborate with multidisciplinary team to address chronic and comorbid conditions and prevent exacerbation or deterioration   Update acute care plan
Problem: Discharge Planning  Goal: Discharge to home or other facility with appropriate resources  Outcome: Progressing  Flowsheets (Taken 8/7/2023 2014)  Discharge to home or other facility with appropriate resources: Identify barriers to discharge with patient and caregiver     Problem: Safety - Adult  Goal: Free from fall injury  Outcome: Progressing     Problem: Pain  Goal: Verbalizes/displays adequate comfort level or baseline comfort level  Outcome: Progressing  Flowsheets (Taken 8/7/2023 1949)  Verbalizes/displays adequate comfort level or baseline comfort level: Encourage patient to monitor pain and request assistance     Problem: ABCDS Injury Assessment  Goal: Absence of physical injury  Outcome: Progressing
Problem: Discharge Planning  Goal: Discharge to home or other facility with appropriate resources  Outcome: Progressing  Flowsheets (Taken 8/9/2023 1625)  Discharge to home or other facility with appropriate resources: Identify barriers to discharge with patient and caregiver  Note: Patient preference to discharge home with family support. Problem: Safety - Adult  Goal: Free from fall injury  Outcome: Progressing  Flowsheets (Taken 8/9/2023 1625)  Free From Fall Injury: Instruct family/caregiver on patient safety  Note: No falls noted this shift. Continue falling star program. Bed alarm on, bed in low position. Call light and personal belongings in reach. Patient uses call light appropriately. Problem: Pain  Goal: Verbalizes/displays adequate comfort level or baseline comfort level  Outcome: Progressing  Flowsheets (Taken 8/9/2023 1625)  Verbalizes/displays adequate comfort level or baseline comfort level: Encourage patient to monitor pain and request assistance  Note: Patient complaints of chronic back pain. Oxycodone PRN available, rest and repositioning for non med measures.  Pain goal is 0 out of 10      Problem: Chronic Conditions and Co-morbidities  Goal: Patient's chronic conditions and co-morbidity symptoms are monitored and maintained or improved  Outcome: Progressing  Flowsheets (Taken 8/9/2023 1629)  Care Plan - Patient's Chronic Conditions and Co-Morbidity Symptoms are Monitored and Maintained or Improved:   Monitor and assess patient's chronic conditions and comorbid symptoms for stability, deterioration, or improvement   Collaborate with multidisciplinary team to address chronic and comorbid conditions and prevent exacerbation or deterioration     Problem: Skin/Tissue Integrity - Adult  Goal: Skin integrity remains intact  Outcome: Progressing  Flowsheets (Taken 8/9/2023 1629)  Skin Integrity Remains Intact: Monitor for areas of redness and/or skin breakdown  Note: No new signs or symptoms
demonstrate effective coping: Assist patient/family to identify coping skills, available support systems and cultural and spiritual values     Problem: Drug Abuse/Detox  Goal: Will have no detox symptoms and will verbalize plan for changing drug-related behavior  Description: INTERVENTIONS:  1. Administer medication as ordered  2. Monitor physical status  3. Provide emotional support with 1:1 interaction with staff  4.  Encourage  recovery focused treatment   Outcome: Progressing  Flowsheets (Taken 8/13/2023 0830)  Will have no detox symptoms and will verbalize plan for changing drug-related behavior: Administer medication as ordered
signs and symptoms of infection   Monitor lab/diagnostic results   Monitor all insertion sites i.e., indwelling lines, tubes and drains   Instruct and encourage patient and family to use good hand hygiene technique   Administer medications as ordered     Problem: Neurosensory - Adult  Goal: Achieves stable or improved neurological status  Outcome: Progressing  Flowsheets (Taken 8/11/2023 2000)  Achieves stable or improved neurological status:   Initiate measures to prevent increased intracranial pressure   Assess for and report changes in neurological status   Maintain blood pressure and fluid volume within ordered parameters to optimize cerebral perfusion and minimize risk of hemorrhage   Monitor temperature, glucose, and sodium. Initiate appropriate interventions as ordered     Problem: Musculoskeletal - Adult  Goal: Return mobility to safest level of function  Outcome: Progressing  Flowsheets (Taken 8/11/2023 2000)  Return Mobility to Safest Level of Function:   Assess patient stability and activity tolerance for standing, transferring and ambulating with or without assistive devices   Assist with transfers and ambulation using safe patient handling equipment as needed   Obtain physical therapy/occupational therapy consults as needed   Ensure adequate protection for wounds/incisions during mobilization   Apply continuous passive motion per provider or physical therapy orders to increase flexion toward goal   Instruct patient/family in ordered activity level     Problem: Gastrointestinal - Adult  Goal: Minimal or absence of nausea and vomiting  Outcome: Progressing  Flowsheets (Taken 8/11/2023 2000)  Minimal or absence of nausea and vomiting: Administer IV fluids as ordered to ensure adequate hydration     Problem: Coping  Goal: Pt/Family able to verbalize concerns and demonstrate effective coping strategies  Description: INTERVENTIONS:  1.  Assist patient/family to identify coping skills, available support systems
Assess and monitor for signs and symptoms of infection   Monitor lab/diagnostic results     Problem: Neurosensory - Adult  Goal: Achieves stable or improved neurological status  8/14/2023 1322 by Rachell Green RN  Outcome: Progressing  Flowsheets (Taken 8/14/2023 1322)  Achieves stable or improved neurological status:   Assess for and report changes in neurological status   Maintain blood pressure and fluid volume within ordered parameters to optimize cerebral perfusion and minimize risk of hemorrhage   Monitor temperature, glucose, and sodium. Initiate appropriate interventions as ordered  8/14/2023 0215 by Siri Weinberg RN  Outcome: Progressing  Flowsheets (Taken 8/14/2023 0215)  Achieves stable or improved neurological status: Assess for and report changes in neurological status  Note: Patient is currently A&Ox4.       Problem: Musculoskeletal - Adult  Goal: Return mobility to safest level of function  8/14/2023 1322 by Rachell Green RN  Outcome: Progressing  Flowsheets (Taken 8/14/2023 1322)  Return Mobility to Safest Level of Function:   Assess patient stability and activity tolerance for standing, transferring and ambulating with or without assistive devices   Assist with transfers and ambulation using safe patient handling equipment as needed   Obtain physical therapy/occupational therapy consults as needed   Instruct patient/family in ordered activity level  8/14/2023 0215 by Siri Weinberg RN  Outcome: Progressing  Flowsheets (Taken 8/14/2023 0215)  Return Mobility to Safest Level of Function:   Assess patient stability and activity tolerance for standing, transferring and ambulating with or without assistive devices   Assist with transfers and ambulation using safe patient handling equipment as needed     Problem: Gastrointestinal - Adult  Goal: Minimal or absence of nausea and vomiting  8/14/2023 1322 by Rachell Green RN  Outcome: Progressing  Flowsheets (Taken 8/14/2023 1322)  Minimal or absence of nausea
Function:   Assess patient stability and activity tolerance for standing, transferring and ambulating with or without assistive devices   Assist with transfers and ambulation using safe patient handling equipment as needed   Obtain physical therapy/occupational therapy consults as needed   Instruct patient/family in ordered activity level     Problem: Gastrointestinal - Adult  Goal: Minimal or absence of nausea and vomiting  8/14/2023 2218 by Leonid Hester RN  Outcome: Progressing  8/14/2023 1322 by Bib Rome RN  Outcome: Progressing  Flowsheets (Taken 8/14/2023 1322)  Minimal or absence of nausea and vomiting:   Administer IV fluids as ordered to ensure adequate hydration   Provide nonpharmacologic comfort measures as appropriate   Advance diet as tolerated, if ordered   Administer ordered antiemetic medications as needed     Problem: Coping  Goal: Pt/Family able to verbalize concerns and demonstrate effective coping strategies  Description: INTERVENTIONS:  1. Assist patient/family to identify coping skills, available support systems and cultural and spiritual values  2. Provide emotional support, including active listening and acknowledgement of concerns of patient and caregivers  3. Reduce environmental stimuli, as able  4. Instruct patient/family in relaxation techniques, as appropriate  5.  Assess for spiritual pain/suffering and initiate Spiritual Care, Psychosocial Clinical Specialist consults as needed  8/14/2023 2218 by Leonid Hester RN  Outcome: Progressing  8/14/2023 1322 by Bib Rome RN  Outcome: Progressing  Flowsheets (Taken 8/14/2023 1322)  Patient/family able to verbalize anxieties, fears, and concerns, and demonstrate effective coping:   Assist patient/family to identify coping skills, available support systems and cultural and spiritual values   Provide emotional support, including active listening and acknowledgement of concerns of patient and caregivers   Reduce environmental
support systems and cultural and spiritual values  2. Provide emotional support, including active listening and acknowledgement of concerns of patient and caregivers  3. Reduce environmental stimuli, as able  4. Instruct patient/family in relaxation techniques, as appropriate  5. Assess for spiritual pain/suffering and initiate Spiritual Care, Psychosocial Clinical Specialist consults as needed  8/9/2023 2229 by Wisam Ugarte RN  Outcome: Progressing  Flowsheets (Taken 8/9/2023 2229)  Patient/family able to verbalize anxieties, fears, and concerns, and demonstrate effective coping: Assist patient/family to identify coping skills, available support systems and cultural and spiritual values  8/9/2023 1629 by Alexandra Newsome RN  Outcome: Progressing     Problem: Drug Abuse/Detox  Goal: Will have no detox symptoms and will verbalize plan for changing drug-related behavior  Description: INTERVENTIONS:  1. Administer medication as ordered  2. Monitor physical status  3. Provide emotional support with 1:1 interaction with staff  4.  Encourage  recovery focused treatment   8/9/2023 2229 by Wisam Ugarte RN  Outcome: Progressing  Flowsheets (Taken 8/9/2023 1629 by Alexandra Newsome RN)  Will have no detox symptoms and will verbalize plan for changing drug-related behavior: Administer medication as ordered  8/9/2023 1629 by Alexandra Newsome RN  Outcome: Progressing  Flowsheets (Taken 8/9/2023 1629)  Will have no detox symptoms and will verbalize plan for changing drug-related behavior: Administer medication as ordered

## 2023-08-16 NOTE — TELEPHONE ENCOUNTER
Patient called and he wanted to move up his appointment from 8/23 to sooner. He is being discharged from the hospital today. Appointment moved up to tomorrow 8/17. Patient wanted see if we could have the paperwork filled out after his appointment tomorrow and faxed to [facility] as he hasn't been paid for 3 weeks. Informed patient we will see him at his appointment tomorrow and will send a message to the provider regarding the paperwork. He verbalized understanding and will be here for his appointment tomorrow morning.

## 2023-08-17 ENCOUNTER — OFFICE VISIT (OUTPATIENT)
Dept: FAMILY MEDICINE CLINIC | Age: 58
End: 2023-08-17

## 2023-08-17 ENCOUNTER — CARE COORDINATION (OUTPATIENT)
Dept: CASE MANAGEMENT | Age: 58
End: 2023-08-17

## 2023-08-17 ENCOUNTER — TELEPHONE (OUTPATIENT)
Dept: FAMILY MEDICINE CLINIC | Age: 58
End: 2023-08-17

## 2023-08-17 VITALS
SYSTOLIC BLOOD PRESSURE: 120 MMHG | HEART RATE: 68 BPM | DIASTOLIC BLOOD PRESSURE: 64 MMHG | RESPIRATION RATE: 16 BRPM | WEIGHT: 283.2 LBS | BODY MASS INDEX: 36.36 KG/M2

## 2023-08-17 DIAGNOSIS — G89.29 ACUTE EXACERBATION OF CHRONIC LOW BACK PAIN: Primary | ICD-10-CM

## 2023-08-17 DIAGNOSIS — E66.01 SEVERE OBESITY (BMI 35.0-39.9) WITH COMORBIDITY (HCC): ICD-10-CM

## 2023-08-17 DIAGNOSIS — F41.3 OTHER MIXED ANXIETY DISORDERS: ICD-10-CM

## 2023-08-17 DIAGNOSIS — I50.22 CHRONIC SYSTOLIC CONGESTIVE HEART FAILURE (HCC): ICD-10-CM

## 2023-08-17 DIAGNOSIS — R29.898 LEG WEAKNESS, BILATERAL: ICD-10-CM

## 2023-08-17 DIAGNOSIS — N17.9 AKI (ACUTE KIDNEY INJURY) (HCC): ICD-10-CM

## 2023-08-17 DIAGNOSIS — M54.50 ACUTE EXACERBATION OF CHRONIC LOW BACK PAIN: Primary | ICD-10-CM

## 2023-08-17 DIAGNOSIS — E11.22 CONTROLLED TYPE 2 DIABETES MELLITUS WITH CHRONIC KIDNEY DISEASE, WITHOUT LONG-TERM CURRENT USE OF INSULIN, UNSPECIFIED CKD STAGE (HCC): ICD-10-CM

## 2023-08-17 DIAGNOSIS — I10 ESSENTIAL HYPERTENSION: ICD-10-CM

## 2023-08-17 DIAGNOSIS — N18.4 CKD (CHRONIC KIDNEY DISEASE) STAGE 4, GFR 15-29 ML/MIN (HCC): ICD-10-CM

## 2023-08-17 DIAGNOSIS — I48.21 PERMANENT ATRIAL FIBRILLATION (HCC): ICD-10-CM

## 2023-08-17 DIAGNOSIS — M48.062 SPINAL STENOSIS OF LUMBAR REGION WITH NEUROGENIC CLAUDICATION: ICD-10-CM

## 2023-08-17 DIAGNOSIS — Z87.891 PERSONAL HISTORY OF TOBACCO USE: ICD-10-CM

## 2023-08-17 DIAGNOSIS — Z09 HOSPITAL DISCHARGE FOLLOW-UP: Primary | ICD-10-CM

## 2023-08-17 DIAGNOSIS — D68.9 COAGULOPATHY (HCC): ICD-10-CM

## 2023-08-17 DIAGNOSIS — Z95.810 ICD (IMPLANTABLE CARDIOVERTER-DEFIBRILLATOR) IN PLACE: ICD-10-CM

## 2023-08-17 DIAGNOSIS — I25.709 CORONARY ARTERY DISEASE INVOLVING CORONARY BYPASS GRAFT OF NATIVE HEART WITH ANGINA PECTORIS (HCC): ICD-10-CM

## 2023-08-17 ASSESSMENT — ENCOUNTER SYMPTOMS
BACK PAIN: 1
SHORTNESS OF BREATH: 0
COUGH: 0
NAUSEA: 0
ABDOMINAL PAIN: 0

## 2023-08-17 NOTE — CARE COORDINATION
[facility] Care Transitions Initial Follow Up Call    Call within 2 business days of discharge: Yes    Patient Current Location:  Home: [address] [place] [address]    Care Transition Nurse contacted the patient by telephone to perform post hospital discharge assessment. Verified name and  with patient as identifiers. Provided introduction to self, and explanation of the Care Transition Nurse role. Patient: Gerald Sanchez Patient : 1965   MRN: [id number]  Reason for Admission: Fall, back pain  Discharge Date: 23 RARS: Readmission Risk Score: 23.1      Last Discharge [address] [facility]       Date Complaint Diagnosis Description Type Department Provider    23 Back Pain; Alcohol Problem Acute exacerbation of chronic low back pain . .. ED to Hosp-Admission (Discharged) (ADMITTED) Hubert Peabody, MD; Deloris Mckoy. .. Challenges to be reviewed by the provider   Additional needs identified to be addressed with provider: No  none               Method of communication with provider: none. Spoke briefly with Quintin Sakshi, said he is ok but tired. Saw PCP this morning. Back pain is better. Has taken a couple of Oxy since discharge. Reviewed medications/changes. Aware of Coumadin doses until INR check at [facility] clinic . Did not check blood sugar this morning. Can return to work . Asked, Anatoly Sullivan I just go to sleep? \"  Was not able to discuss anything further. Will continue to follow. Care Transition Nurse reviewed discharge instructions, medical action plan, and red flags with patient who verbalized understanding. The patient was given an opportunity to ask questions and does not have any further questions or concerns at this time. Were discharge instructions available to patient? Yes. Reviewed appropriate site of care based on symptoms and resources available to patient including: PCP  Specialist  Urgent care clinics  When to call 911.  The patient agrees to contact the PCP office for

## 2023-08-17 NOTE — PROGRESS NOTES
extremity edema. We will assess renal function tomorrow  Acute Alcohol Withdrawal: PAWSS score 7. Serum alcohol level on admission 0.10. Has required several doses of IV Phenobarbital. Will stop Phenobarbital push panel protocol and change to prophylaxis protocol 8/8. Continue Seizure/fall precautions. Addiction/ to see. Continue Multivitamin, thiamine, and folic acid. Will finish last dose of phenobarbital protocol 8/11. No signs of withdrawal on exam.  Mechanical Falls: Reports mechanical falls over the last 2 weeks with often tripping over items on his floor. Denies hitting his head or losing consciousness. CT head and cervical spine negative for acute processes. PT/OT. Acute on chronic HFrEF with CMP: s/p ICD. Echo 11/2022 shows EF 30% with severely reduced systolic function and severe global hypokinesis of LV. Reported volume overload on admission. Resume home medications. Strict I&Os. Daily weights. Fluid/salt restriction. Continue BB, SGLT2. Holding losartan and Bumex with BABAK. No evidence of volume overload on exam.   CAD s/p CABG/PCI: Continue home medications. Holding Brilinta temporarily until seen by pain management. Permanent Atrial Fibrillation on OAC: s/p ablation in 2022- on amiodarone and mexilitine. EKG on arrival showing sinus rhythm pharmacy consult to dose warfarin. Monitor on tele. [id number] score of 4. Currently holding Coumadin for plans with plans for epidural injection. Will defer heparin at this time as patient is thrombocytopenic  NIDDMII: Continue oral medications. Noted to have periods of hypoglycemia. We will hold Lantus in the evening. Continue sliding scale insulin with meals, carb controlled diet, hypoglycemia treatment protocol. Macrocytic anemia. Hbg stable at 9.7. Thrombocytopenia due to ETOH use. .   Hx of Noncompliance: Noted per chart review, patient admits to being noncompliant with all medications since he started drinking about 2

## 2023-08-17 NOTE — TELEPHONE ENCOUNTER
Care Transitions Initial Follow Up Call    Outreach made within 2 business days of discharge: Yes    Patient: Bucky Ventura Patient : 1965   MRN: [id number]  Reason for Admission: There are no discharge diagnoses documented for the most recent discharge. Discharge Date: 23       Spoke with: Patient    Discharge department/facility: [facility]    TCM Interactive Patient Contact:  Was patient able to fill all prescriptions: Yes  Was patient instructed to bring all medications to the follow-up visit: Yes  Is patient taking all medications as directed in the discharge summary? Yes  Does patient understand their discharge instructions: Yes  Does patient have questions or concerns that need addressed prior to 7-14 day follow up office visit: no    Scheduled appointment with PCP within 7-14 days. Patient seen today 23 in office.      Follow Up  Future Appointments   Date Time Provider [facility] [address]   2023 11:40 AM MD ROSA Jung [facility]   2023  3:30 PM Grayson Bauer APRN - [address] [place] [address]   2023  1:45 PM SCHEDULE, SRPX PACER NURSE N [facility]PX PACER [facility]   2023  1:45 PM MD ROSA Desai [facility] Heart [facility]   2024  1:00 PM [address], MD N SRPX Heart 1314  [facility]

## 2023-08-22 ENCOUNTER — CARE COORDINATION (OUTPATIENT)
Dept: CASE MANAGEMENT | Age: 58
End: 2023-08-22

## 2023-08-22 ENCOUNTER — TELEPHONE (OUTPATIENT)
Dept: FAMILY MEDICINE CLINIC | Age: 58
End: 2023-08-22

## 2023-08-22 ENCOUNTER — PROCEDURE VISIT (OUTPATIENT)
Dept: CARDIOLOGY CLINIC | Age: 58
End: 2023-08-22
Payer: COMMERCIAL

## 2023-08-22 DIAGNOSIS — I50.43 ACUTE ON CHRONIC COMBINED SYSTOLIC AND DIASTOLIC CONGESTIVE HEART FAILURE (HCC): Primary | ICD-10-CM

## 2023-08-22 PROCEDURE — 93297 REM INTERROG DEV EVAL ICPMS: CPT | Performed by: INTERNAL MEDICINE

## 2023-08-22 PROCEDURE — G2066 INTER DEVC REMOTE 30D: HCPCS | Performed by: INTERNAL MEDICINE

## 2023-08-22 NOTE — PROGRESS NOTES
[facility] Med[employer] Dual ICD Optivol  Pt of Baki    Battery 13 months     Optivol WNL

## 2023-08-22 NOTE — TELEPHONE ENCOUNTER
Pt called office stating he is currently on medical leave from work and is to return to work tomorrow 8/23/23. Pt is requesting to extend his medical leave and be off work until Monday 8/28/23. \"My right leg is still not healed and swollen\". Pt says his right leg is swollen from the knee down to his foot and won't be able to get his work shoe on. Pt currently wearing compression stockings. Left leg is fine. Isela Asencio is faxing over paperwork today to extend his medical leave. Call pt back once forms are complete. Please advise.

## 2023-08-22 NOTE — CARE COORDINATION
[facility] Care Transitions Follow Up Call    Patient Current Location:  Home: [address]  [address] [address]    Care Transition Nurse contacted the patient by telephone to follow up after admission on 23. Verified name and  with patient as identifiers. Patient: Shmuel Hirsch  Patient : 1965   MRN: [id number]  Reason for Admission: Fall, back pain  Discharge Date: 23 RARS: Readmission Risk Score: 23.1      Needs to be reviewed by the provider   Additional needs identified to be addressed with provider: No  none             Method of communication with provider: none. Spoke with Evelia Costello, not very engaged. Minimal back pain. He ws to RTW tomorrow but called PCP this morning. Said his right leg is still swollen and can't get his work boots on, requested to extend medical leave. He is wearing compression stockings as directed, left leg is not swollen. Denies increased SOB/WEATEHRS. Does not monitor his weight. Instructed on the importance of weighing daily, in the morning after urinating, same amount of clothing and that if he has weight gain of 2-3 lbs or more overnight OR 5 lbs or more in 3 days to call his physician immediately and report. Taking medications as directed. Eating, drinking is good. Not sleeping as well as he had. Denies any other needs. No other questions or concerns at this time. Will continue to follow. Addressed changes since last contact:  none  Discussed follow-up appointments. If no appointment was previously scheduled, appointment scheduling offered: Yes. Is follow up appointment scheduled within 7 days of discharge? Yes.     Follow Up  Future Appointments   Date Time Provider [address] [place] [address]   2023 11:40 AM [facility]   2023  3:30 PM ISABELLA Dixon - [address] [place] [address]   2023  1:45 PM SCHEDULE, SRPX PACER NURSE N SRPX PACER [facility]   2023  1:45 PM MD ROSA Dewitt SRPX Heart [facility]

## 2023-08-22 NOTE — TELEPHONE ENCOUNTER
Pt called office back, says he spoke with Kathie Adrian and we should receive the forms by the end of the day today. Pt wants to make sure you are extending his time off? He wants to confirm the RTW date. Please advise.

## 2023-08-28 ENCOUNTER — TELEPHONE (OUTPATIENT)
Dept: FAMILY MEDICINE CLINIC | Age: 58
End: 2023-08-28

## 2023-08-28 NOTE — TELEPHONE ENCOUNTER
----- Message from Preet Sales sent at 8/28/2023  3:38 PM EDT -----  Subject: Message to Provider    QUESTIONS  Information for Provider? William Chaudhary called to get an update on the pt's   paperwork for their disability. She said it was sent on 8/22/23. Please   advise. Please call [phone], ext [phone], claim [id number].  ---------------------------------------------------------------------------  --------------  Leila Aleman INFO  [phone]; OK to leave message on voicemail  ---------------------------------------------------------------------------  --------------  SCRIPT ANSWERS  Relationship to Patient? Covered Entity  Covered Entity Type? Other  Other Covered Entity Type? [address] [place] [address]  Representative Name?  Tierra Jose

## 2023-08-29 ENCOUNTER — APPOINTMENT (OUTPATIENT)
Dept: CT IMAGING | Age: 58
End: 2023-08-29
Payer: COMMERCIAL

## 2023-08-29 ENCOUNTER — APPOINTMENT (OUTPATIENT)
Dept: GENERAL RADIOLOGY | Age: 58
End: 2023-08-29
Payer: COMMERCIAL

## 2023-08-29 ENCOUNTER — HOSPITAL ENCOUNTER (INPATIENT)
Age: 58
LOS: 4 days | Discharge: HOME | End: 2023-09-02
Attending: INTERNAL MEDICINE
Payer: COMMERCIAL

## 2023-08-29 DIAGNOSIS — F10.930 ALCOHOL WITHDRAWAL SYNDROME WITHOUT COMPLICATION (HCC): Primary | ICD-10-CM

## 2023-08-29 DIAGNOSIS — R07.9 RIGHT-SIDED CHEST PAIN: ICD-10-CM

## 2023-08-29 DIAGNOSIS — R77.8 ELEVATED TROPONIN: ICD-10-CM

## 2023-08-29 LAB
ALBUMIN SERPL BCG-MCNC: 3.3 G/DL (ref 3.5–5.1)
ALP SERPL-CCNC: 111 U/L (ref 38–126)
ALT SERPL W/O P-5'-P-CCNC: 28 U/L (ref 11–66)
ANION GAP SERPL CALC-SCNC: 18 MEQ/L (ref 8–16)
APTT PPP: 40.8 SECONDS (ref 22–38)
AST SERPL-CCNC: 40 U/L (ref 5–40)
BASOPHILS ABSOLUTE: 0.1 THOU/MM3 (ref 0–0.1)
BASOPHILS NFR BLD AUTO: 1.4 %
BILIRUB CONJ SERPL-MCNC: < 0.2 MG/DL (ref 0–0.3)
BILIRUB SERPL-MCNC: 0.3 MG/DL (ref 0.3–1.2)
BUN SERPL-MCNC: 26 MG/DL (ref 7–22)
CALCIUM SERPL-MCNC: 8.3 MG/DL (ref 8.5–10.5)
CHLORIDE SERPL-SCNC: 111 MEQ/L (ref 98–111)
CO2 SERPL-SCNC: 16 MEQ/L (ref 23–33)
CREAT SERPL-MCNC: 3.1 MG/DL (ref 0.4–1.2)
DEPRECATED RDW RBC AUTO: 53.8 FL (ref 35–45)
DEPRECATED RDW RBC AUTO: 56 FL (ref 35–45)
EOSINOPHIL NFR BLD AUTO: 4.7 %
EOSINOPHILS ABSOLUTE: 0.4 THOU/MM3 (ref 0–0.4)
ERYTHROCYTE [DISTWIDTH] IN BLOOD BY AUTOMATED COUNT: 14.9 % (ref 11.5–14.5)
ERYTHROCYTE [DISTWIDTH] IN BLOOD BY AUTOMATED COUNT: 15.2 % (ref 11.5–14.5)
ETHANOL SERPL-MCNC: 0.13 %
GFR SERPL CREATININE-BSD FRML MDRD: 22 ML/MIN/1.73M2
GLUCOSE BLD STRIP.AUTO-MCNC: 239 MG/DL (ref 70–108)
GLUCOSE SERPL-MCNC: 191 MG/DL (ref 70–108)
HCT VFR BLD AUTO: 29.4 % (ref 42–52)
HCT VFR BLD AUTO: 32.5 % (ref 42–52)
HEPARIN UNFRACTIONATED: 0.05 U/ML (ref 0.3–0.7)
HGB BLD-MCNC: 10.5 GM/DL (ref 14–18)
HGB BLD-MCNC: 9.6 GM/DL (ref 14–18)
IMM GRANULOCYTES # BLD AUTO: 0.04 THOU/MM3 (ref 0–0.07)
IMM GRANULOCYTES NFR BLD AUTO: 0.5 %
INR PPP: 0.88 (ref 0.85–1.13)
LACTATE SERPL-SCNC: 2.7 MMOL/L (ref 0.5–2)
LIPASE SERPL-CCNC: 130.4 U/L (ref 5.6–51.3)
LYMPHOCYTES ABSOLUTE: 1.1 THOU/MM3 (ref 1–4.8)
LYMPHOCYTES NFR BLD AUTO: 12.9 %
MAGNESIUM SERPL-MCNC: 2.8 MG/DL (ref 1.6–2.4)
MCH RBC QN AUTO: 32.7 PG (ref 26–33)
MCH RBC QN AUTO: 32.9 PG (ref 26–33)
MCHC RBC AUTO-ENTMCNC: 32.3 GM/DL (ref 32.2–35.5)
MCHC RBC AUTO-ENTMCNC: 32.7 GM/DL (ref 32.2–35.5)
MCV RBC AUTO: 100.7 FL (ref 80–94)
MCV RBC AUTO: 101.2 FL (ref 80–94)
MONOCYTES ABSOLUTE: 0.7 THOU/MM3 (ref 0.4–1.3)
MONOCYTES NFR BLD AUTO: 8 %
NEUTROPHILS NFR BLD AUTO: 72.5 %
NRBC BLD AUTO-RTO: 0 /100 WBC
OSMOLALITY SERPL CALC.SUM OF ELEC: 298.6 MOSMOL/KG (ref 275–300)
PLATELET # BLD AUTO: 162 THOU/MM3 (ref 130–400)
PLATELET # BLD AUTO: 210 THOU/MM3 (ref 130–400)
PMV BLD AUTO: 9.5 FL (ref 9.4–12.4)
PMV BLD AUTO: 9.6 FL (ref 9.4–12.4)
POTASSIUM SERPL-SCNC: 4.2 MEQ/L (ref 3.5–5.2)
PROT SERPL-MCNC: 6 G/DL (ref 6.1–8)
RBC # BLD AUTO: 2.92 MILL/MM3 (ref 4.7–6.1)
RBC # BLD AUTO: 3.21 MILL/MM3 (ref 4.7–6.1)
SEGMENTED NEUTROPHILS ABSOLUTE COUNT: 6.2 THOU/MM3 (ref 1.8–7.7)
SODIUM SERPL-SCNC: 145 MEQ/L (ref 135–145)
TROPONIN, HIGH SENSITIVITY: 209 NG/L (ref 0–12)
TROPONIN, HIGH SENSITIVITY: 212 NG/L (ref 0–12)
TROPONIN, HIGH SENSITIVITY: 232 NG/L (ref 0–12)
WBC # BLD AUTO: 7.5 THOU/MM3 (ref 4.8–10.8)
WBC # BLD AUTO: 8.5 THOU/MM3 (ref 4.8–10.8)

## 2023-08-29 PROCEDURE — 6370000000 HC RX 637 (ALT 250 FOR IP)

## 2023-08-29 PROCEDURE — 6360000002 HC RX W HCPCS: Performed by: STUDENT IN AN ORGANIZED HEALTH CARE EDUCATION/TRAINING PROGRAM

## 2023-08-29 PROCEDURE — 85730 THROMBOPLASTIN TIME PARTIAL: CPT

## 2023-08-29 PROCEDURE — 99285 EMERGENCY DEPT VISIT HI MDM: CPT

## 2023-08-29 PROCEDURE — 82948 REAGENT STRIP/BLOOD GLUCOSE: CPT

## 2023-08-29 PROCEDURE — 6360000002 HC RX W HCPCS

## 2023-08-29 PROCEDURE — 85025 COMPLETE CBC W/AUTO DIFF WBC: CPT

## 2023-08-29 PROCEDURE — 6370000000 HC RX 637 (ALT 250 FOR IP): Performed by: STUDENT IN AN ORGANIZED HEALTH CARE EDUCATION/TRAINING PROGRAM

## 2023-08-29 PROCEDURE — 72125 CT NECK SPINE W/O DYE: CPT

## 2023-08-29 PROCEDURE — 80048 BASIC METABOLIC PNL TOTAL CA: CPT

## 2023-08-29 PROCEDURE — 85610 PROTHROMBIN TIME: CPT

## 2023-08-29 PROCEDURE — 96365 THER/PROPH/DIAG IV INF INIT: CPT

## 2023-08-29 PROCEDURE — 2580000003 HC RX 258: Performed by: STUDENT IN AN ORGANIZED HEALTH CARE EDUCATION/TRAINING PROGRAM

## 2023-08-29 PROCEDURE — 1200000003 HC TELEMETRY R&B

## 2023-08-29 PROCEDURE — 36415 COLL VENOUS BLD VENIPUNCTURE: CPT

## 2023-08-29 PROCEDURE — 6360000002 HC RX W HCPCS: Performed by: NURSE PRACTITIONER

## 2023-08-29 PROCEDURE — 83605 ASSAY OF LACTIC ACID: CPT

## 2023-08-29 PROCEDURE — 96375 TX/PRO/DX INJ NEW DRUG ADDON: CPT

## 2023-08-29 PROCEDURE — 84484 ASSAY OF TROPONIN QUANT: CPT

## 2023-08-29 PROCEDURE — 85027 COMPLETE CBC AUTOMATED: CPT

## 2023-08-29 PROCEDURE — 80076 HEPATIC FUNCTION PANEL: CPT

## 2023-08-29 PROCEDURE — 93005 ELECTROCARDIOGRAM TRACING: CPT | Performed by: INTERNAL MEDICINE

## 2023-08-29 PROCEDURE — 83690 ASSAY OF LIPASE: CPT

## 2023-08-29 PROCEDURE — 99223 1ST HOSP IP/OBS HIGH 75: CPT | Performed by: INTERNAL MEDICINE

## 2023-08-29 PROCEDURE — 93010 ELECTROCARDIOGRAM REPORT: CPT | Performed by: NUCLEAR MEDICINE

## 2023-08-29 PROCEDURE — 82077 ASSAY SPEC XCP UR&BREATH IA: CPT

## 2023-08-29 PROCEDURE — 83735 ASSAY OF MAGNESIUM: CPT

## 2023-08-29 PROCEDURE — 71101 X-RAY EXAM UNILAT RIBS/CHEST: CPT

## 2023-08-29 PROCEDURE — 85520 HEPARIN ASSAY: CPT

## 2023-08-29 PROCEDURE — 70450 CT HEAD/BRAIN W/O DYE: CPT

## 2023-08-29 RX ORDER — HEPARIN SODIUM 1000 [USP'U]/ML
4000 INJECTION, SOLUTION INTRAVENOUS; SUBCUTANEOUS ONCE
Status: DISCONTINUED | OUTPATIENT
Start: 2023-08-29 | End: 2023-08-29 | Stop reason: ALTCHOICE

## 2023-08-29 RX ORDER — PHENOBARBITAL SODIUM 65 MG/ML
130 INJECTION INTRAMUSCULAR
Status: DISCONTINUED | OUTPATIENT
Start: 2023-08-29 | End: 2023-09-02 | Stop reason: HOSPADM

## 2023-08-29 RX ORDER — LORAZEPAM 2 MG/ML
0.5 INJECTION INTRAMUSCULAR ONCE
Status: COMPLETED | OUTPATIENT
Start: 2023-08-29 | End: 2023-08-29

## 2023-08-29 RX ORDER — INSULIN LISPRO 100 [IU]/ML
0-4 INJECTION, SOLUTION INTRAVENOUS; SUBCUTANEOUS NIGHTLY
Status: DISCONTINUED | OUTPATIENT
Start: 2023-08-29 | End: 2023-09-02 | Stop reason: HOSPADM

## 2023-08-29 RX ORDER — INSULIN LISPRO 100 [IU]/ML
0-16 INJECTION, SOLUTION INTRAVENOUS; SUBCUTANEOUS
Status: DISCONTINUED | OUTPATIENT
Start: 2023-08-29 | End: 2023-09-02 | Stop reason: HOSPADM

## 2023-08-29 RX ORDER — BUMETANIDE 1 MG/1
2 TABLET ORAL DAILY
Status: DISCONTINUED | OUTPATIENT
Start: 2023-08-29 | End: 2023-09-02 | Stop reason: HOSPADM

## 2023-08-29 RX ORDER — HYDRALAZINE HYDROCHLORIDE 50 MG/1
50 TABLET, FILM COATED ORAL 3 TIMES DAILY
Status: DISCONTINUED | OUTPATIENT
Start: 2023-08-29 | End: 2023-09-02 | Stop reason: HOSPADM

## 2023-08-29 RX ORDER — INSULIN GLARGINE 100 [IU]/ML
6 INJECTION, SOLUTION SUBCUTANEOUS NIGHTLY
Status: DISCONTINUED | OUTPATIENT
Start: 2023-08-29 | End: 2023-09-02 | Stop reason: HOSPADM

## 2023-08-29 RX ORDER — POLYETHYLENE GLYCOL 3350 17 G/17G
17 POWDER, FOR SOLUTION ORAL DAILY PRN
Status: DISCONTINUED | OUTPATIENT
Start: 2023-08-29 | End: 2023-09-02 | Stop reason: HOSPADM

## 2023-08-29 RX ORDER — PHENOBARBITAL 32.4 MG/1
64.8 TABLET ORAL 2 TIMES DAILY
Status: COMPLETED | OUTPATIENT
Start: 2023-08-30 | End: 2023-08-30

## 2023-08-29 RX ORDER — PHENOBARBITAL SODIUM 65 MG/ML
260 INJECTION INTRAMUSCULAR
Status: DISCONTINUED | OUTPATIENT
Start: 2023-08-29 | End: 2023-09-02 | Stop reason: HOSPADM

## 2023-08-29 RX ORDER — LANOLIN ALCOHOL/MO/W.PET/CERES
100 CREAM (GRAM) TOPICAL DAILY
Status: DISCONTINUED | OUTPATIENT
Start: 2023-08-29 | End: 2023-09-02 | Stop reason: HOSPADM

## 2023-08-29 RX ORDER — NITROGLYCERIN 20 MG/100ML
5-200 INJECTION INTRAVENOUS CONTINUOUS
Status: DISCONTINUED | OUTPATIENT
Start: 2023-08-29 | End: 2023-09-02 | Stop reason: HOSPADM

## 2023-08-29 RX ORDER — AMLODIPINE BESYLATE 5 MG/1
5 TABLET ORAL 2 TIMES DAILY
Status: DISCONTINUED | OUTPATIENT
Start: 2023-08-29 | End: 2023-09-02 | Stop reason: HOSPADM

## 2023-08-29 RX ORDER — SODIUM CHLORIDE 9 MG/ML
INJECTION, SOLUTION INTRAVENOUS PRN
Status: DISCONTINUED | OUTPATIENT
Start: 2023-08-29 | End: 2023-09-02 | Stop reason: HOSPADM

## 2023-08-29 RX ORDER — ACETAMINOPHEN 650 MG/1
650 SUPPOSITORY RECTAL EVERY 6 HOURS PRN
Status: DISCONTINUED | OUTPATIENT
Start: 2023-08-29 | End: 2023-09-02 | Stop reason: HOSPADM

## 2023-08-29 RX ORDER — WARFARIN SODIUM 7.5 MG/1
7.5 TABLET ORAL
Status: COMPLETED | OUTPATIENT
Start: 2023-08-29 | End: 2023-08-29

## 2023-08-29 RX ORDER — ENOXAPARIN SODIUM 100 MG/ML
30 INJECTION SUBCUTANEOUS EVERY 12 HOURS
Status: DISCONTINUED | OUTPATIENT
Start: 2023-08-29 | End: 2023-08-29 | Stop reason: ALTCHOICE

## 2023-08-29 RX ORDER — SODIUM CHLORIDE 0.9 % (FLUSH) 0.9 %
10 SYRINGE (ML) INJECTION PRN
Status: DISCONTINUED | OUTPATIENT
Start: 2023-08-29 | End: 2023-09-02 | Stop reason: HOSPADM

## 2023-08-29 RX ORDER — PHENOBARBITAL 32.4 MG/1
32.4 TABLET ORAL 2 TIMES DAILY
Status: DISPENSED | OUTPATIENT
Start: 2023-08-31 | End: 2023-09-01

## 2023-08-29 RX ORDER — HEPARIN SODIUM 1000 [USP'U]/ML
2000 INJECTION, SOLUTION INTRAVENOUS; SUBCUTANEOUS PRN
Status: DISCONTINUED | OUTPATIENT
Start: 2023-08-29 | End: 2023-08-29 | Stop reason: ALTCHOICE

## 2023-08-29 RX ORDER — ASPIRIN 81 MG/1
324 TABLET, CHEWABLE ORAL ONCE
Status: COMPLETED | OUTPATIENT
Start: 2023-08-29 | End: 2023-08-29

## 2023-08-29 RX ORDER — ENOXAPARIN SODIUM 150 MG/ML
1 INJECTION SUBCUTANEOUS EVERY 12 HOURS
Status: DISCONTINUED | OUTPATIENT
Start: 2023-08-29 | End: 2023-09-02 | Stop reason: HOSPADM

## 2023-08-29 RX ORDER — DOXAZOSIN 2 MG/1
2 TABLET ORAL DAILY
Status: DISCONTINUED | OUTPATIENT
Start: 2023-08-29 | End: 2023-09-02 | Stop reason: HOSPADM

## 2023-08-29 RX ORDER — ACETAMINOPHEN 325 MG/1
650 TABLET ORAL EVERY 6 HOURS PRN
Status: DISCONTINUED | OUTPATIENT
Start: 2023-08-29 | End: 2023-09-02 | Stop reason: HOSPADM

## 2023-08-29 RX ORDER — ONDANSETRON 2 MG/ML
4 INJECTION INTRAMUSCULAR; INTRAVENOUS EVERY 6 HOURS PRN
Status: DISCONTINUED | OUTPATIENT
Start: 2023-08-29 | End: 2023-09-02 | Stop reason: HOSPADM

## 2023-08-29 RX ORDER — METOPROLOL SUCCINATE 100 MG/1
100 TABLET, EXTENDED RELEASE ORAL DAILY
Status: DISCONTINUED | OUTPATIENT
Start: 2023-08-29 | End: 2023-09-02 | Stop reason: HOSPADM

## 2023-08-29 RX ORDER — ONDANSETRON 4 MG/1
4 TABLET, ORALLY DISINTEGRATING ORAL EVERY 8 HOURS PRN
Status: DISCONTINUED | OUTPATIENT
Start: 2023-08-29 | End: 2023-09-02 | Stop reason: HOSPADM

## 2023-08-29 RX ORDER — LABETALOL HYDROCHLORIDE 5 MG/ML
10 INJECTION INTRAVENOUS EVERY 6 HOURS PRN
Status: DISCONTINUED | OUTPATIENT
Start: 2023-08-29 | End: 2023-09-02 | Stop reason: HOSPADM

## 2023-08-29 RX ORDER — MEXILETINE HYDROCHLORIDE 150 MG/1
300 CAPSULE ORAL 3 TIMES DAILY
Status: DISCONTINUED | OUTPATIENT
Start: 2023-08-29 | End: 2023-09-02 | Stop reason: HOSPADM

## 2023-08-29 RX ORDER — HEPARIN SODIUM 1000 [USP'U]/ML
4000 INJECTION, SOLUTION INTRAVENOUS; SUBCUTANEOUS PRN
Status: DISCONTINUED | OUTPATIENT
Start: 2023-08-29 | End: 2023-08-29 | Stop reason: ALTCHOICE

## 2023-08-29 RX ORDER — OXYCODONE HYDROCHLORIDE AND ACETAMINOPHEN 5; 325 MG/1; MG/1
1 TABLET ORAL
Status: COMPLETED | OUTPATIENT
Start: 2023-08-29 | End: 2023-08-29

## 2023-08-29 RX ORDER — LORAZEPAM 2 MG/ML
INJECTION INTRAMUSCULAR
Status: COMPLETED
Start: 2023-08-29 | End: 2023-08-29

## 2023-08-29 RX ORDER — CYCLOBENZAPRINE HCL 10 MG
10 TABLET ORAL 3 TIMES DAILY PRN
Status: DISCONTINUED | OUTPATIENT
Start: 2023-08-29 | End: 2023-09-02 | Stop reason: HOSPADM

## 2023-08-29 RX ORDER — SODIUM CHLORIDE 0.9 % (FLUSH) 0.9 %
5-40 SYRINGE (ML) INJECTION EVERY 12 HOURS SCHEDULED
Status: DISCONTINUED | OUTPATIENT
Start: 2023-08-29 | End: 2023-09-02 | Stop reason: HOSPADM

## 2023-08-29 RX ORDER — HEPARIN SODIUM 10000 [USP'U]/100ML
5-30 INJECTION, SOLUTION INTRAVENOUS CONTINUOUS
Status: DISCONTINUED | OUTPATIENT
Start: 2023-08-29 | End: 2023-08-29

## 2023-08-29 RX ORDER — DEXTROSE MONOHYDRATE 100 MG/ML
INJECTION, SOLUTION INTRAVENOUS CONTINUOUS PRN
Status: DISCONTINUED | OUTPATIENT
Start: 2023-08-29 | End: 2023-09-02 | Stop reason: HOSPADM

## 2023-08-29 RX ORDER — ISOSORBIDE MONONITRATE 60 MG/1
120 TABLET, EXTENDED RELEASE ORAL DAILY
Status: DISCONTINUED | OUTPATIENT
Start: 2023-08-29 | End: 2023-09-02 | Stop reason: HOSPADM

## 2023-08-29 RX ADMIN — LORAZEPAM 0.5 MG: 2 INJECTION INTRAMUSCULAR; INTRAVENOUS at 11:27

## 2023-08-29 RX ADMIN — AMLODIPINE BESYLATE 5 MG: 5 TABLET ORAL at 21:08

## 2023-08-29 RX ADMIN — NITROGLYCERIN 5 MCG/MIN: 20 INJECTION INTRAVENOUS at 13:27

## 2023-08-29 RX ADMIN — OXYCODONE AND ACETAMINOPHEN 1 TABLET: 5; 325 TABLET ORAL at 09:55

## 2023-08-29 RX ADMIN — PHENOBARBITAL SODIUM 328.9 MG: 65 INJECTION INTRAMUSCULAR at 18:32

## 2023-08-29 RX ADMIN — WARFARIN SODIUM 7.5 MG: 7.5 TABLET ORAL at 21:08

## 2023-08-29 RX ADMIN — ONDANSETRON 4 MG: 2 INJECTION INTRAMUSCULAR; INTRAVENOUS at 22:07

## 2023-08-29 RX ADMIN — ENOXAPARIN SODIUM 120 MG: 150 INJECTION SUBCUTANEOUS at 21:19

## 2023-08-29 RX ADMIN — ASPIRIN 81 MG 324 MG: 81 TABLET ORAL at 13:39

## 2023-08-29 RX ADMIN — DOXAZOSIN 2 MG: 2 TABLET ORAL at 18:26

## 2023-08-29 RX ADMIN — PHENOBARBITAL SODIUM 328.9 MG: 65 INJECTION INTRAMUSCULAR at 21:13

## 2023-08-29 RX ADMIN — METOPROLOL SUCCINATE 100 MG: 100 TABLET, EXTENDED RELEASE ORAL at 18:26

## 2023-08-29 RX ADMIN — BUMETANIDE 2 MG: 1 TABLET ORAL at 18:26

## 2023-08-29 RX ADMIN — TICAGRELOR 90 MG: 90 TABLET ORAL at 21:08

## 2023-08-29 RX ADMIN — LORAZEPAM 0.5 MG: 2 INJECTION INTRAMUSCULAR at 11:27

## 2023-08-29 RX ADMIN — ISOSORBIDE MONONITRATE 120 MG: 60 TABLET, EXTENDED RELEASE ORAL at 18:26

## 2023-08-29 RX ADMIN — PHENOBARBITAL SODIUM 260 MG: 65 INJECTION INTRAMUSCULAR at 13:48

## 2023-08-29 RX ADMIN — INSULIN GLARGINE 6 UNITS: 100 INJECTION, SOLUTION SUBCUTANEOUS at 21:19

## 2023-08-29 RX ADMIN — HYDRALAZINE HYDROCHLORIDE 50 MG: 50 TABLET, FILM COATED ORAL at 21:08

## 2023-08-29 RX ADMIN — PHENOBARBITAL SODIUM 260 MG: 65 INJECTION INTRAMUSCULAR at 14:50

## 2023-08-29 RX ADMIN — HYDROMORPHONE HYDROCHLORIDE 0.5 MG: 1 INJECTION, SOLUTION INTRAMUSCULAR; INTRAVENOUS; SUBCUTANEOUS at 18:21

## 2023-08-29 RX ADMIN — HYDROMORPHONE HYDROCHLORIDE 0.5 MG: 1 INJECTION, SOLUTION INTRAMUSCULAR; INTRAVENOUS; SUBCUTANEOUS at 21:57

## 2023-08-29 RX ADMIN — MEXILETINE HYDROCHLORIDE 300 MG: 150 CAPSULE ORAL at 21:18

## 2023-08-29 RX ADMIN — Medication 100 MG: at 18:26

## 2023-08-29 ASSESSMENT — PAIN DESCRIPTION - ORIENTATION
ORIENTATION: RIGHT
ORIENTATION: RIGHT

## 2023-08-29 ASSESSMENT — PAIN DESCRIPTION - DESCRIPTORS: DESCRIPTORS: SHARP

## 2023-08-29 ASSESSMENT — PAIN DESCRIPTION - LOCATION
LOCATION: BACK;NECK;RIB CAGE
LOCATION: RIB CAGE

## 2023-08-29 ASSESSMENT — PAIN SCALES - GENERAL
PAINLEVEL_OUTOF10: 10
PAINLEVEL_OUTOF10: 10
PAINLEVEL_OUTOF10: 8
PAINLEVEL_OUTOF10: 10
PAINLEVEL_OUTOF10: 10
PAINLEVEL_OUTOF10: 9

## 2023-08-29 ASSESSMENT — PAIN - FUNCTIONAL ASSESSMENT
PAIN_FUNCTIONAL_ASSESSMENT: 0-10
PAIN_FUNCTIONAL_ASSESSMENT: PREVENTS OR INTERFERES SOME ACTIVE ACTIVITIES AND ADLS

## 2023-08-29 ASSESSMENT — PAIN SCALES - WONG BAKER: WONGBAKER_NUMERICALRESPONSE: 0

## 2023-08-29 NOTE — ED NOTES
Utilize [facility] alcohol withdrawal scale (Based on [place] Modified Alcohol Withdrawal Scale). Tabulate score based on classifications including Tremor, Sweating, Hallucination, Orientation, and Agitation. Tremor: 1  Sweatin  Hallucinations: 0  Orientation: 0  Agitation: 2  Total Score: 4  Action perform as described below     Tremor:  No tremor is 0 points. Tremor on movement is 1 point. Tremor at rest is 2 points. Sweating: No Sweat 0 points. Moist is 1 point. Drenching sweats is 2 points. Hallucinations: No present 0 points. Dissuadable is 1 point. Not dissuadable is 2 points. Orientation: Oriented 0 points. Vague/detached 1 point. Disoriented/no contact 2 points. Agitation: Calm 0 points. Anxious 1 point. Panicky 2 points. Check scale every 2 hours. Discontinue scoring with 4 consecutive scorings of 0. Scale 0: No phenobarbital given. Re-assess every 60 minutes as needed. Scale 1-3: Phenobarbital 130 mg IV over 3 minutes. Re-assess every 60 minutes as needed. May administer every 60 minutes to a maximum dose of phenobarbital 1040 mg in 24 hours! Scale 4-8: Phenobarbital 260 mg IV over 5 minutes. Re-assess every 60 minutes as needed. May administer every 60 minutes to a maximum dose of phenobarbital 1040mg in 24 hours! Scale 9-10: Transfer to ICU (if not already in ICU). Administer 10mg/kg phenobarbital IV over 60 minutes. Maximum dose phenobarbital is 1040mg in 24 hours!       Michela Gamino RN  23 [id number]

## 2023-08-29 NOTE — H&P
Hospitalist - History & Physical    Patient: Violet Cuadra    Unit/Bed:07/007A  YOB: 1965  MRN: [id number]   Acct: [de-identified]   PCP: ISABELLA Zhou CNP    Date of Service: Pt seen/examined on 08/29/23  and Admitted to Inpatient with expected LOS greater than two midnights due to medical therapy. Chief Complaint: Right chest wall pain from fall    Assessment and Plan:  Hypertensive Urgency: /98 mmHg on arrival. Due to medical noncompliance with home regimen. Openly admits to not taking his home meds. Was started on nitro gtt in ED but this should be tapered off by morning. Restart home meds now: Bumex, Toprol, Imdur, Hydralazine, Cardura, Hydralazine. Chest Wall Pain on Right: Musculoskeletal pain he states s/p mechanical fall. Falls are due to intoxicated state. Radiographs in ED negative for fractures. Continue pain control. Acute Alcohol Intoxication: PAWSS 5. Serum alcohol level on admission 0.13. 2nd hospitalization this month for same thing. Seizure/fall precautions. Addiction/ to see. Continue Multivitamin, thiamine, and folic acid. Start on phenobarb prophylaxis with scheduled dosing, GMAWS scale  HAGMA 2/2 Lactic Acidosis: Due to alcohol intake, poor PO intake. Monitor with BMP  Multivessel CAD: S/p PCI to LAD, Diag and CABG. On Brilinta, Toprol, Imdur but has been noncompliant in taking with any of his medications  CKD Stage IV: Stable with Cr 3.1, GFR 22 (Baseline). Avoid nephrotoxic agents as possible. Daily BMP  Chronic Systolic HFrEF 49-17%: s/p ICD. Echo 11/2022 shows EF 30%, severely reduced systolic function, severe global hypokinesis of LV. Compounded by alcohol abuse. Strict I&Os. Daily weights. Fluid/salt restriction. Continue BB, Bumex. Long-Standing Persistent Atrial Fibrillation: CHADS-VASc 4. S/p ablation in 2022, currently NSR with heavy PVCs. Non-compliant with his coumadin, INR 0.8. Resume home amiodarone and mexilitine. suspicious findings in the lung apices. No fracture. **This report has been created using voice recognition software. It may contain minor errors which are inherent in voice recognition technology. ** Final report electronically signed by Dr. Yury Pompa on 8/29/2023 11:19 AM    EKG: Rate 93 bpm, NSR, heavy PVC burden, no ST changes    Electronically signed by Robb Lambert DO on 8/29/2023 at 3:47 PM

## 2023-08-29 NOTE — ED NOTES
Pt awake yelling at RN, staying \"I am in a lot of pain and nobody is helping me. \" Pt anxious and agitated. Provider notified. Phenobarbital orders placed.      Michela Gamino RN  08/29/23 9336

## 2023-08-29 NOTE — ED NOTES
Patient resting in bed with family at bedside, Requesting more for pain. Call light in reach. Will continue to monitor.         Michela Gamino RN  08/29/23 7871

## 2023-08-29 NOTE — PROGRESS NOTES
Skin assessment complete with Duana Dinning. Right lower leg dusky in color. Both heels moist boggy and yellowish drainage noted to socks but heels are intact.

## 2023-08-29 NOTE — ED TRIAGE NOTES
Presents to ED from home via ATFD with complaints of ongoing rib pain. Pt states he fell in his garage on Friday and has been having sever right sided rib pain since. States he has not been able to deal with the  pain so he has been drinking liquor. Pt states \"I can't take it anymore. \" PT denies suicidal ideation. EKG completed.

## 2023-08-29 NOTE — ED NOTES
Pt to be transported to [facility] via wheelchair. Spoke with RN Macy prior to departure. VSS will reassess prior to leaving.       James Gain  08/29/23 8865

## 2023-08-29 NOTE — ED NOTES
Patient resting in bed. Respirations easy and unlabored. No distress noted. Pt requesting something to help with his nerves. Continues to complain of pain and requesting pain medicine. Provider notified. Call light within reach.        Michela Gamino RN  08/29/23 4149

## 2023-08-29 NOTE — ED NOTES
Patient resting in bed. Respirations easy and unlabored. No distress noted. Pt complaining of pain and requesting pain medicine. Pt refusing Xray until he gets pain medicine. Provider notified. Call light within reach.       Michela Gamino RN  08/29/23 6079       Michela Gamino RN  08/29/23 4723

## 2023-08-29 NOTE — PROGRESS NOTES
This virtual nurse attempted to complete admission questions with patient. He refuses camera activation as well as questions at this time. Patient states he would like to complete this tomorrow morning. Dayshift virtual nurse will follow up in morning.

## 2023-08-29 NOTE — ED NOTES
Utilize [facility] alcohol withdrawal scale (Based on [place] Modified Alcohol Withdrawal Scale). Tabulate score based on classifications including Tremor, Sweating, Hallucination, Orientation, and Agitation. Tremor: 1  Sweatin  Hallucinations: 0  Orientation: 0  Agitation: 2  Total Score: 4  Action perform as described below     Tremor:  No tremor is 0 points. Tremor on movement is 1 point. Tremor at rest is 2 points. Sweating: No Sweat 0 points. Moist is 1 point. Drenching sweats is 2 points. Hallucinations: No present 0 points. Dissuadable is 1 point. Not dissuadable is 2 points. Orientation: Oriented 0 points. Vague/detached 1 point. Disoriented/no contact 2 points. Agitation: Calm 0 points. Anxious 1 point. Panicky 2 points. Check scale every 2 hours. Discontinue scoring with 4 consecutive scorings of 0. Scale 0: No phenobarbital given. Re-assess every 60 minutes as needed. Scale 1-3: Phenobarbital 130 mg IV over 3 minutes. Re-assess every 60 minutes as needed. May administer every 60 minutes to a maximum dose of phenobarbital 1040 mg in 24 hours! Scale 4-8: Phenobarbital 260 mg IV over 5 minutes. Re-assess every 60 minutes as needed. May administer every 60 minutes to a maximum dose of phenobarbital 1040mg in 24 hours! Scale 9-10: Transfer to ICU (if not already in ICU). Administer 10mg/kg phenobarbital IV over 60 minutes. Maximum dose phenobarbital is 1040mg in 24 hours!       Michela Gamino RN  23 0370

## 2023-08-29 NOTE — ED NOTES
Pt continues to ask for more pain medicine. Pt medicated by PRN orders.      Michela Gamino, ALETA  08/29/23 4079

## 2023-08-29 NOTE — ED NOTES
ED to inpatient nurses report    Chief Complaint   Patient presents with    Rib Pain (injury)     R      Present to ED from home  LOC: alert and orientated to name, place, date  Vital signs   Vitals:    08/29/23 1349 08/29/23 1359 08/29/23 1406 08/29/23 1415   BP: (!) 210/80 (!) 197/102 (!) 170/88 (!) 197/77   Pulse: 96 86 96 92   Resp: 20 15 15 18   Temp:       TempSrc:       SpO2: 96% 98% 97% 94%   Weight:       Height:          Oxygen Baseline room air    Current needs required none Bipap/Cpap No  LDAs:   Peripheral IV 08/29/23 Right Antecubital (Active)   Site Assessment Clean, dry & intact 08/29/23 0910     Mobility: Requires assistance * 1  Pending ED orders: phenobarb  Present condition: stable      C-SSRS Risk of Suicide: No Risk  Swallow Screening    Preferred Language: Ruslan     Electronically signed by Elisabet Gamino RN on 8/29/2023 at 2:19 PM      Michela Gamino RN  08/29/23 3848

## 2023-08-29 NOTE — ED NOTES
Pt continues to call RN in room stating \"the medicine is not working, I'm in pain damnit now give me something for my pain. \" Educated pt on PRN orders, pt states \"this is ridiculous, you're just going to let me lay here in pain. \" Will continue to monitor.      Michela Gamino, ALETA  08/29/23 5719

## 2023-08-29 NOTE — ED PROVIDER NOTES
[facility] EMERGENCY DEPT      EMERGENCY MEDICINE     Pt Name: Chely Rosas  MRN: [id number]  [address] [facility] [place] 1965  Date of evaluation: 8/29/2023  Provider: Roberth Mansfield PA-C    CHIEF COMPLAINT       Chief Complaint   Patient presents with    Rib Pain (injury)     R     HISTORY OF PRESENT ILLNESS   Chely Rosas is a 62 y.o. male with history of alcoholism, MI, and CKD who presents to the ED for evaluation of fall onset 3 days. Patient states that he was at home and fell on concrete 3 days ago. Patient states that he did lose consciousness but is unsure if he hit his head. Patient states that he is currently having severe right-sided chest pain and left sided neck pain. Patient states that his last drink was just prior to arrival.  Patient denies fever, chills, body aches, headaches, cough, shortness of breath, abdominal pain, vomiting, diarrhea, dysuria, and dizziness. 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 9) with comorbidity ([address]) 05/22/2023    V-tach [facility])     s/p ablation in dec 2014       Patient Active Problem List   Diagnosis Code    Coronary artery disease involving native coronary artery of native heart with unstable angina pectoris ([address]) I25.110    Depression F32. A    Hyperlipidemia E78.5    Tobacco abuse Z72.0    Paroxysmal atrial fibrillation (HCC) I48.0    Urinary frequency R35.0    Left

## 2023-08-29 NOTE — PROGRESS NOTES
Warfarin Pharmacy Consult Note    Emmy Ordonez is a 62 y.o. male for whom pharmacy has been asked to manage warfarin therapy. Consulting Provider: Dr. Cindy Martinez  Warfarin Indication: Atrial fibrillation or Atrial flutter  Target INR range: 2.0-3.0   Warfarin dose prior to admission: 2.5 mg MWF and 5 mg all other days prior to 8/7/23 admission  Outpatient warfarin provider: [facility]    Recent Labs     08/29/23  1003   INR 0.88     Recent Labs     08/29/23  0915 08/29/23  1600   HGB 10.5* 9.6*    162     Concurrent anticoagulants/antiplatelets: ticagrelor  Significant warfarin drug-drug interactions: phenobarbital    Date INR Warfarin Dose Lovenox Dose   08/29/23 0.88 7.5 mg 120 mg Q12H                                         Dr. Cindy Martinez asked to bridge the patient with Lovenox    INR will be monitored routinely until therapeutic INR is achieved. Pharmacy will continue to follow.  Thank you for the consult,   Jose Henley, Ken  8/29/2023 5:29 PM

## 2023-08-30 LAB
ANION GAP SERPL CALC-SCNC: 11 MEQ/L (ref 8–16)
BUN SERPL-MCNC: 26 MG/DL (ref 7–22)
CA-I BLD ISE-SCNC: 1.09 MMOL/L (ref 1.12–1.32)
CALCIUM SERPL-MCNC: 8 MG/DL (ref 8.5–10.5)
CHLORIDE SERPL-SCNC: 105 MEQ/L (ref 98–111)
CK SERPL-CCNC: 62 U/L (ref 55–170)
CO2 SERPL-SCNC: 23 MEQ/L (ref 23–33)
CREAT SERPL-MCNC: 2.9 MG/DL (ref 0.4–1.2)
EKG ATRIAL RATE: 93 BPM
EKG P AXIS: 91 DEGREES
EKG P-R INTERVAL: 184 MS
EKG Q-T INTERVAL: 368 MS
EKG QRS DURATION: 86 MS
EKG QTC CALCULATION (BAZETT): 457 MS
EKG R AXIS: 10 DEGREES
EKG T AXIS: 84 DEGREES
EKG VENTRICULAR RATE: 93 BPM
GFR SERPL CREATININE-BSD FRML MDRD: 24 ML/MIN/1.73M2
GLUCOSE BLD STRIP.AUTO-MCNC: 179 MG/DL (ref 70–108)
GLUCOSE BLD STRIP.AUTO-MCNC: 193 MG/DL (ref 70–108)
GLUCOSE BLD STRIP.AUTO-MCNC: 195 MG/DL (ref 70–108)
GLUCOSE BLD STRIP.AUTO-MCNC: 239 MG/DL (ref 70–108)
GLUCOSE SERPL-MCNC: 183 MG/DL (ref 70–108)
INR PPP: 0.98 (ref 0.85–1.13)
LACTATE SERPL-SCNC: 1.4 MMOL/L (ref 0.5–2)
POTASSIUM SERPL-SCNC: 4.4 MEQ/L (ref 3.5–5.2)
SODIUM SERPL-SCNC: 139 MEQ/L (ref 135–145)
TROPONIN, HIGH SENSITIVITY: 158 NG/L (ref 0–12)
TROPONIN, HIGH SENSITIVITY: 164 NG/L (ref 0–12)

## 2023-08-30 PROCEDURE — 6360000002 HC RX W HCPCS

## 2023-08-30 PROCEDURE — 6370000000 HC RX 637 (ALT 250 FOR IP)

## 2023-08-30 PROCEDURE — 36415 COLL VENOUS BLD VENIPUNCTURE: CPT

## 2023-08-30 PROCEDURE — 6370000000 HC RX 637 (ALT 250 FOR IP): Performed by: STUDENT IN AN ORGANIZED HEALTH CARE EDUCATION/TRAINING PROGRAM

## 2023-08-30 PROCEDURE — 82330 ASSAY OF CALCIUM: CPT

## 2023-08-30 PROCEDURE — 83605 ASSAY OF LACTIC ACID: CPT

## 2023-08-30 PROCEDURE — 85610 PROTHROMBIN TIME: CPT

## 2023-08-30 PROCEDURE — 1200000003 HC TELEMETRY R&B

## 2023-08-30 PROCEDURE — 99232 SBSQ HOSP IP/OBS MODERATE 35: CPT | Performed by: STUDENT IN AN ORGANIZED HEALTH CARE EDUCATION/TRAINING PROGRAM

## 2023-08-30 PROCEDURE — 2580000003 HC RX 258: Performed by: STUDENT IN AN ORGANIZED HEALTH CARE EDUCATION/TRAINING PROGRAM

## 2023-08-30 PROCEDURE — 82948 REAGENT STRIP/BLOOD GLUCOSE: CPT

## 2023-08-30 PROCEDURE — 82550 ASSAY OF CK (CPK): CPT

## 2023-08-30 PROCEDURE — 6360000002 HC RX W HCPCS: Performed by: NURSE PRACTITIONER

## 2023-08-30 PROCEDURE — 84484 ASSAY OF TROPONIN QUANT: CPT

## 2023-08-30 PROCEDURE — 6360000002 HC RX W HCPCS: Performed by: STUDENT IN AN ORGANIZED HEALTH CARE EDUCATION/TRAINING PROGRAM

## 2023-08-30 PROCEDURE — 80048 BASIC METABOLIC PNL TOTAL CA: CPT

## 2023-08-30 PROCEDURE — 94761 N-INVAS EAR/PLS OXIMETRY MLT: CPT

## 2023-08-30 RX ORDER — LIDOCAINE 4 G/G
1 PATCH TOPICAL DAILY
Status: DISCONTINUED | OUTPATIENT
Start: 2023-08-30 | End: 2023-09-02 | Stop reason: HOSPADM

## 2023-08-30 RX ORDER — NICOTINE 21 MG/24HR
1 PATCH, TRANSDERMAL 24 HOURS TRANSDERMAL DAILY
Status: DISCONTINUED | OUTPATIENT
Start: 2023-08-30 | End: 2023-09-02 | Stop reason: HOSPADM

## 2023-08-30 RX ORDER — OXYCODONE HYDROCHLORIDE AND ACETAMINOPHEN 5; 325 MG/1; MG/1
1 TABLET ORAL EVERY 4 HOURS PRN
Status: DISCONTINUED | OUTPATIENT
Start: 2023-08-30 | End: 2023-09-02 | Stop reason: HOSPADM

## 2023-08-30 RX ORDER — HYDROXYZINE PAMOATE 25 MG/1
25 CAPSULE ORAL ONCE
Status: COMPLETED | OUTPATIENT
Start: 2023-08-31 | End: 2023-08-30

## 2023-08-30 RX ORDER — WARFARIN SODIUM 7.5 MG/1
7.5 TABLET ORAL
Status: COMPLETED | OUTPATIENT
Start: 2023-08-30 | End: 2023-08-30

## 2023-08-30 RX ADMIN — TICAGRELOR 90 MG: 90 TABLET ORAL at 08:08

## 2023-08-30 RX ADMIN — OXYCODONE AND ACETAMINOPHEN 1 TABLET: 5; 325 TABLET ORAL at 15:48

## 2023-08-30 RX ADMIN — ENOXAPARIN SODIUM 120 MG: 150 INJECTION SUBCUTANEOUS at 21:31

## 2023-08-30 RX ADMIN — Medication 100 MG: at 08:07

## 2023-08-30 RX ADMIN — HYDRALAZINE HYDROCHLORIDE 50 MG: 50 TABLET, FILM COATED ORAL at 08:08

## 2023-08-30 RX ADMIN — TICAGRELOR 90 MG: 90 TABLET ORAL at 20:48

## 2023-08-30 RX ADMIN — HYDROMORPHONE HYDROCHLORIDE 0.5 MG: 1 INJECTION, SOLUTION INTRAMUSCULAR; INTRAVENOUS; SUBCUTANEOUS at 01:03

## 2023-08-30 RX ADMIN — HYDROMORPHONE HYDROCHLORIDE 0.5 MG: 1 INJECTION, SOLUTION INTRAMUSCULAR; INTRAVENOUS; SUBCUTANEOUS at 09:52

## 2023-08-30 RX ADMIN — CYCLOBENZAPRINE 10 MG: 10 TABLET, FILM COATED ORAL at 08:08

## 2023-08-30 RX ADMIN — MEXILETINE HYDROCHLORIDE 300 MG: 150 CAPSULE ORAL at 20:48

## 2023-08-30 RX ADMIN — HYDROMORPHONE HYDROCHLORIDE 0.5 MG: 1 INJECTION, SOLUTION INTRAMUSCULAR; INTRAVENOUS; SUBCUTANEOUS at 12:55

## 2023-08-30 RX ADMIN — WARFARIN SODIUM 7.5 MG: 7.5 TABLET ORAL at 17:58

## 2023-08-30 RX ADMIN — PHENOBARBITAL SODIUM 130 MG: 65 INJECTION INTRAMUSCULAR at 15:55

## 2023-08-30 RX ADMIN — DOXAZOSIN 2 MG: 2 TABLET ORAL at 08:07

## 2023-08-30 RX ADMIN — MEXILETINE HYDROCHLORIDE 300 MG: 150 CAPSULE ORAL at 08:07

## 2023-08-30 RX ADMIN — HYDRALAZINE HYDROCHLORIDE 50 MG: 50 TABLET, FILM COATED ORAL at 14:29

## 2023-08-30 RX ADMIN — SODIUM CHLORIDE, PRESERVATIVE FREE 10 ML: 5 INJECTION INTRAVENOUS at 20:52

## 2023-08-30 RX ADMIN — AMLODIPINE BESYLATE 5 MG: 5 TABLET ORAL at 20:48

## 2023-08-30 RX ADMIN — BUMETANIDE 2 MG: 1 TABLET ORAL at 08:07

## 2023-08-30 RX ADMIN — METOPROLOL SUCCINATE 100 MG: 100 TABLET, EXTENDED RELEASE ORAL at 08:08

## 2023-08-30 RX ADMIN — PHENOBARBITAL 64.8 MG: 32.4 TABLET ORAL at 20:51

## 2023-08-30 RX ADMIN — OXYCODONE AND ACETAMINOPHEN 1 TABLET: 5; 325 TABLET ORAL at 20:49

## 2023-08-30 RX ADMIN — ENOXAPARIN SODIUM 120 MG: 150 INJECTION SUBCUTANEOUS at 08:13

## 2023-08-30 RX ADMIN — ACETAMINOPHEN 650 MG: 325 TABLET ORAL at 08:08

## 2023-08-30 RX ADMIN — PHENOBARBITAL 64.8 MG: 32.4 TABLET ORAL at 08:10

## 2023-08-30 RX ADMIN — INSULIN GLARGINE 6 UNITS: 100 INJECTION, SOLUTION SUBCUTANEOUS at 21:31

## 2023-08-30 RX ADMIN — AMLODIPINE BESYLATE 5 MG: 5 TABLET ORAL at 08:08

## 2023-08-30 RX ADMIN — HYDROXYZINE PAMOATE 25 MG: 25 CAPSULE ORAL at 23:55

## 2023-08-30 RX ADMIN — HYDROMORPHONE HYDROCHLORIDE 0.5 MG: 1 INJECTION, SOLUTION INTRAMUSCULAR; INTRAVENOUS; SUBCUTANEOUS at 04:52

## 2023-08-30 RX ADMIN — MEXILETINE HYDROCHLORIDE 300 MG: 150 CAPSULE ORAL at 14:29

## 2023-08-30 RX ADMIN — HYDRALAZINE HYDROCHLORIDE 50 MG: 50 TABLET, FILM COATED ORAL at 20:48

## 2023-08-30 RX ADMIN — ISOSORBIDE MONONITRATE 120 MG: 60 TABLET, EXTENDED RELEASE ORAL at 08:07

## 2023-08-30 ASSESSMENT — PAIN DESCRIPTION - ORIENTATION
ORIENTATION: RIGHT

## 2023-08-30 ASSESSMENT — PAIN - FUNCTIONAL ASSESSMENT
PAIN_FUNCTIONAL_ASSESSMENT: PREVENTS OR INTERFERES SOME ACTIVE ACTIVITIES AND ADLS

## 2023-08-30 ASSESSMENT — PAIN DESCRIPTION - LOCATION
LOCATION: RIB CAGE

## 2023-08-30 ASSESSMENT — PAIN DESCRIPTION - DESCRIPTORS
DESCRIPTORS: ACHING
DESCRIPTORS: SHARP

## 2023-08-30 ASSESSMENT — PAIN SCALES - GENERAL
PAINLEVEL_OUTOF10: 10
PAINLEVEL_OUTOF10: 9
PAINLEVEL_OUTOF10: 6
PAINLEVEL_OUTOF10: 9
PAINLEVEL_OUTOF10: 4

## 2023-08-30 ASSESSMENT — PAIN SCALES - WONG BAKER
WONGBAKER_NUMERICALRESPONSE: 0
WONGBAKER_NUMERICALRESPONSE: 0

## 2023-08-30 NOTE — PLAN OF CARE
Problem: Discharge Planning  Goal: Discharge to home or other facility with appropriate resources  Outcome: Progressing  Flowsheets (Taken 8/30/2023 0122)  Discharge to home or other facility with appropriate resources:   Identify barriers to discharge with patient and caregiver   Arrange for needed discharge resources and transportation as appropriate   Identify discharge learning needs (meds, wound care, etc)     Problem: Pain  Goal: Verbalizes/displays adequate comfort level or baseline comfort level  Outcome: Progressing  Flowsheets (Taken 8/30/2023 0122)  Verbalizes/displays adequate comfort level or baseline comfort level:   Encourage patient to monitor pain and request assistance   Assess pain using appropriate pain scale   Administer analgesics based on type and severity of pain and evaluate response   Implement non-pharmacological measures as appropriate and evaluate response     Problem: Respiratory - Adult  Goal: Achieves optimal ventilation and oxygenation  Outcome: Progressing  Flowsheets (Taken 8/30/2023 0122)  Achieves optimal ventilation and oxygenation: Assess for changes in respiratory status     Problem: Cardiovascular - Adult  Goal: Maintains optimal cardiac output and hemodynamic stability  Outcome: Progressing  Flowsheets (Taken 8/30/2023 0122)  Maintains optimal cardiac output and hemodynamic stability:   Monitor blood pressure and heart rate   Assess for signs of decreased cardiac output  Goal: Absence of cardiac dysrhythmias or at baseline  Outcome: Progressing  Flowsheets (Taken 8/30/2023 0122)  Absence of cardiac dysrhythmias or at baseline:   Monitor cardiac rate and rhythm   Assess for signs of decreased cardiac output   Administer antiarrhythmia medication and electrolyte replacement as ordered     Problem: Skin/Tissue Integrity - Adult  Goal: Skin integrity remains intact  Outcome: Progressing  Flowsheets (Taken 8/30/2023 0122)  Skin Integrity Remains Intact: Monitor for areas of redness and/or skin breakdown     Problem: Infection - Adult  Goal: Absence of infection during hospitalization  Outcome: Progressing  Flowsheets (Taken 8/30/2023 0122)  Absence of infection during hospitalization:   Assess and monitor for signs and symptoms of infection   Monitor lab/diagnostic results     Problem: Metabolic/Fluid and Electrolytes - Adult  Goal: Electrolytes maintained within normal limits  Outcome: Progressing  Flowsheets (Taken 8/30/2023 0122)  Electrolytes maintained within normal limits:   Monitor labs and assess patient for signs and symptoms of electrolyte imbalances   Administer electrolyte replacement as ordered  Goal: Hemodynamic stability and optimal renal function maintained  Outcome: Progressing  Flowsheets (Taken 8/30/2023 0122)  Hemodynamic stability and optimal renal function maintained:   Monitor labs and assess for signs and symptoms of volume excess or deficit   Monitor intake, output and patient weight     Problem: Safety - Adult  Goal: Free from fall injury  Outcome: Progressing  Flowsheets (Taken 8/30/2023 0122)  Free From Fall Injury: Instruct family/caregiver on patient safety     Problem: Chronic Conditions and Co-morbidities  Goal: Patient's chronic conditions and co-morbidity symptoms are monitored and maintained or improved  Outcome: Progressing  Flowsheets (Taken 8/30/2023 0122)  Care Plan - Patient's Chronic Conditions and Co-Morbidity Symptoms are Monitored and Maintained or Improved:   Monitor and assess patient's chronic conditions and comorbid symptoms for stability, deterioration, or improvement   Collaborate with multidisciplinary team to address chronic and comorbid conditions and prevent exacerbation or deterioration   Update acute care plan with appropriate goals if chronic or comorbid symptoms are exacerbated and prevent overall improvement and discharge     Care plan reviewed with patient.   Patient verbalizes understanding of the care plan and contributed to

## 2023-08-30 NOTE — PROGRESS NOTES
[facility] [facility] THERAPY MISSED TREATMENT NOTE  [facility] RENAL TELEMETRY 6K  6K-009-A      Date: 2023  Patient Name: Siomara Andres        CSN: [id number]   : 1965  (62 y.o.)  Gender: male   Referring Practitioner: Dr. Kayy Ewing DO  Diagnosis: Chest Pain         REASON FOR MISSED TREATMENT: Patient Refused. Pt reported feeling too tired to participate in OT evaluation. He agreed to work with therapy tomorrow.

## 2023-08-30 NOTE — CARE COORDINATION
08/30/23 1048   Readmission Assessment   Number of Days since last admission? 8-30 days   Previous Disposition Home Alone   Who is being Interviewed Patient   What was the patient's/caregiver's perception as to why they think they needed to return back to the hospital? Other (Comment)  (c/o rib pain and need for detox)   Did you visit your Primary Care Physician after you left the hospital, before you returned this time? Yes   Did you see a specialist, such as Cardiac, Pulmonary, Orthopedic Physician, etc. after you left the hospital? No   Who advised the patient to return to the hospital? Self-referral   Does the patient report anything that got in the way of taking their medications? No   In our efforts to provide the best possible care to you and others like you, can you think of anything that we could have done to help you after you left the hospital the first time, so that you might not have needed to return so soon?  Other (Comment)  (\"no\")

## 2023-08-30 NOTE — PROGRESS NOTES
Utilize [facility] alcohol withdrawal scale (Based on [place] Modified Alcohol Withdrawal Scale). Tabulate score based on classifications including Tremor, Sweating, Hallucination, Orientation, and Agitation. Tremor: 0  Sweatin  Hallucinations: 0  Orientation: 0  Agitation: 1  Total Score: 1  Action perform as described below     Tremor:  No tremor is 0 points. Tremor on movement is 1 point. Tremor at rest is 2 points. Sweating: No Sweat 0 points. Moist is 1 point. Drenching sweats is 2 points. Hallucinations: No present 0 points. Dissuadable is 1 point. Not dissuadable is 2 points. Orientation: Oriented 0 points. Vague/detached 1 point. Disoriented/no contact 2 points. Agitation: Calm 0 points. Anxious 1 point. Panicky 2 points. Check scale every 2 hours. Discontinue scoring with 4 consecutive scorings of 0. Scale 0: No phenobarbital given. Re-assess every 60 minutes as needed. Scale 1-3: Phenobarbital 130 mg IV over 3 minutes. Re-assess every 60 minutes as needed. May administer every 60 minutes to a maximum dose of phenobarbital 1040 mg in 24 hours! Scale 4-8: Phenobarbital 260 mg IV over 5 minutes. Re-assess every 60 minutes as needed. May administer every 60 minutes to a maximum dose of phenobarbital 1040mg in 24 hours! Scale 9-10: Transfer to ICU (if not already in ICU). Administer 10mg/kg phenobarbital IV over 60 minutes. Maximum dose phenobarbital is 1040mg in 24 hours!

## 2023-08-30 NOTE — PROGRESS NOTES
Hospitalist - History & Physical    Patient: [facility]    Unit/Bed:6K-09/009-A  YOB: 1965  MRN: [id number]   Acct: [de-identified]   PCP: ISABELLA Abreu CNP    Date of Service: Pt seen/examined on 08/30/23  and Admitted to Inpatient with expected LOS greater than two midnights due to medical therapy. Chief Complaint: Right chest wall pain from fall    Assessment and Plan:  Hypertensive Urgency: /98 mmHg on arrival. Due to medical noncompliance with home regimen. Openly admits to not taking his home meds. On nitro infusion, wean down currently off. Resumed home medications including Bumex, Toprol, Imdur, Hydralazine, Cardura, Hydralazine. Chest Wall Pain on Right: Musculoskeletal pain he states s/p mechanical fall. Falls are due to intoxicated state. Radiographs in ED negative for fractures. Continue pain control. Discontinue Dilaudid. Acute Alcohol Intoxication: PAWSS 5. Serum alcohol level on admission 0.13. 2nd hospitalization this month for same thing. Seizure/fall precautions. Addiction/ to see. Continue Multivitamin, thiamine, and folic acid. Start on phenobarb prophylaxis with scheduled dosing, GMAWS scale  HAGMA 2/2 Lactic Acidosis: Resolved. Due to alcohol intake, poor PO intake. Monitor with BMP  Multivessel CAD: S/p PCI to LAD, Diag and CABG. On Brilinta, Toprol, Imdur but has been noncompliant in taking with any of his medications  CKD Stage IV: Stable with Cr 3.1, GFR 22 (Baseline). Avoid nephrotoxic agents as possible. Daily BMP  Chronic Systolic HFrEF 65-29%: s/p ICD. Echo 11/2022 shows EF 30%, severely reduced systolic function, severe global hypokinesis of LV. Compounded by alcohol abuse. Strict I&Os. Daily weights. Fluid/salt restriction. Continue BB, Bumex. Long-Standing Persistent Atrial Fibrillation: CHADS-VASc 4. S/p ablation in 2022, currently NSR with heavy PVCs. Non-compliant with his coumadin, INR 0.8.  Resume home technology. ** Final report electronically signed by Dr Blue Sanchez on 8/29/2023 10:31 AM    CT HEAD WO CONTRAST    Result Date: 8/29/2023  PROCEDURE: CT HEAD WO CONTRAST CLINICAL INFORMATION: fall, LOC. COMPARISON: Head CT 8/7/2023. TECHNIQUE: Noncontrast 5 mm axial images were obtained through the brain. Sagittal and coronal reconstructions were obtained. . All CT scans at this facility use dose modulation, iterative reconstruction, and/or weight-based dosing when appropriate to reduce radiation dose to as low as reasonably achievable. FINDINGS: The brain volume is normal. There is no hemorrhage. There are no intra-or extra-axial collections. There is no hydrocephalus, midline shift or mass effect. The gray-white matter differentiation is preserved. There are vascular calcifications. There is some mild mucosal thickening in the maxillary sinuses. There is no suspicious calvarial abnormality. There is no significant change compared to the prior study. No evidence of an acute process. No change from prior. **This report has been created using voice recognition software. It may contain minor errors which are inherent in voice recognition technology. ** Final report electronically signed by Dr. Myrtis Merlin on 8/29/2023 11:14 AM    CT CSpine W/O Contrast    Result Date: 8/29/2023  PROCEDURE: CT CERVICAL SPINE WO CONTRAST CLINICAL INFORMATION: neck pain. Evens Quivers. COMPARISON: CT cervical spine 8/7/2023. TECHNIQUE: 3 mm noncontrast axial images were obtained through the cervical spine with sagittal and coronal reconstructions. All CT scans at this facility use dose modulation, iterative reconstruction, and/or weight-based dosing when appropriate to reduce radiation dose to as low as reasonably achievable. FINDINGS: The cervical alignment is stable. There is a slight anterolisthesis of C3 on C4. There is a mild anterior angulation of C4 on C5. There is loss of disc height at the C5-6 and C6-7 levels.   There is no

## 2023-08-30 NOTE — FLOWSHEET NOTE
08/30/23 1030   Safe Environment   Safety Measures Standard Safety Measures;Call light within reach; Bed in low position  (virtual safety round complete)     Pt sitting up on edge of bed. Pt states he is having pain 10/10. Will notify bedside nurse via telephone. Completed admission questions and home medication list at this time. Pt stated he has not taken his medications at home for a few days.

## 2023-08-30 NOTE — PROGRESS NOTES
Utilize [facility] alcohol withdrawal scale (Based on [place] Modified Alcohol Withdrawal Scale). Tabulate score based on classifications including Tremor, Sweating, Hallucination, Orientation, and Agitation. Tremor: 0  Sweatin  Hallucinations: 0  Orientation: 0  Agitation: 0  Total Score: 0  Action perform as described below     Tremor:  No tremor is 0 points. Tremor on movement is 1 point. Tremor at rest is 2 points. Sweating: No Sweat 0 points. Moist is 1 point. Drenching sweats is 2 points. Hallucinations: No present 0 points. Dissuadable is 1 point. Not dissuadable is 2 points. Orientation: Oriented 0 points. Vague/detached 1 point. Disoriented/no contact 2 points. Agitation: Calm 0 points. Anxious 1 point. Panicky 2 points. Check scale every 2 hours. Discontinue scoring with 4 consecutive scorings of 0. Scale 0: No phenobarbital given. Re-assess every 60 minutes as needed. Scale 1-3: Phenobarbital 130 mg IV over 3 minutes. Re-assess every 60 minutes as needed. May administer every 60 minutes to a maximum dose of phenobarbital 1040 mg in 24 hours! Scale 4-8: Phenobarbital 260 mg IV over 5 minutes. Re-assess every 60 minutes as needed. May administer every 60 minutes to a maximum dose of phenobarbital 1040mg in 24 hours! Scale 9-10: Transfer to ICU (if not already in ICU). Administer 10mg/kg phenobarbital IV over 60 minutes. Maximum dose phenobarbital is 1040mg in 24 hours!

## 2023-08-30 NOTE — PROGRESS NOTES
Warfarin Pharmacy Consult Note    Warfarin Indication: Atrial fibrillation or Atrial flutter  Target INR: 2.0-3.0  Dose prior to admission: Waiting on confirmation from [facility]     08/29/23  1003 08/30/23  0442   INR 0.88 0.98     Recent Labs     08/29/23  0915 08/29/23  1600   HGB 10.5* 9.6*    162     Concurrent anticoagulants/antiplatelets: ticagrelor  Significant warfarin drug-drug interactions: phenobarbital     Date INR Warfarin Dose   8/29/23 0.88 7.5 mg   8/30/23  0.98   7.5 mg                                               Monitoring:                   INR will be monitored daily until therapeutic INR is achieved.     **Please contact pharmacy for discharge instructions when indicated**    Harish Nation, PharmD  8/30/2023 1:34 PM

## 2023-08-30 NOTE — CARE COORDINATION
Case Management Assessment  Initial Evaluation    Date/Time of Evaluation: 8/30/2023 10:53 AM  Assessment Completed by: Jaqui Chadwick RN    If patient is discharged prior to next notation, then this note serves as note for discharge by case management. Patient Name: Noemy Blue                   YOB: 1965  Diagnosis: Chest pain [R07.9]  Elevated troponin [R77.8]  Alcohol withdrawal syndrome without complication [facility]) [B57.064]                   Date / Time: 8/29/2023  8:57 AM  [facility]: [facility]     Patient Admission Status: Inpatient   Readmission Risk Low 0-14, Mod 15-19), High > 20: Readmission Risk Score: 28.9    Current PCP: ISABELLA Gandara CNP  PCP verified by CM? Yes    Chart Reviewed: Yes      History Provided by: Patient  Patient Orientation: Alert and Oriented    Patient Cognition: Alert    Hospitalization in the last 30 days (Readmission):  Yes    If yes, Readmission Assessment in CM Navigator will be completed. Advance Directives:      Code Status: Full Code   Patient's Primary Decision Maker is: Named in [facility] José Miguel     Primary Decision Maker: Abdon Byrd [employer]) - Child - [phone]    Discharge Planning:    Patient lives with: Alone Type of Home: House  Primary Care Giver: Self  Patient Support Systems include: Children, Family Members   Current Financial resources: Other (Comment) (commercial)  Current community resources: None  Current services prior to admission: None            Current DME:              Type of Home Care services:  None    ADLS  Prior functional level: Independent in ADLs/IADLs  Current functional level: Independent in ADLs/IADLs    Family can provide assistance at DC: Yes  Would you like Case Management to discuss the discharge plan with any other family members/significant others, and if so, who?  No  Plans to Return to Present Housing: Yes  Other Identified Issues/Barriers to RETURNING to current housing: none  Potential Assistance

## 2023-08-31 LAB
ANION GAP SERPL CALC-SCNC: 11 MEQ/L (ref 8–16)
BUN SERPL-MCNC: 25 MG/DL (ref 7–22)
CALCIUM SERPL-MCNC: 8.2 MG/DL (ref 8.5–10.5)
CHLORIDE SERPL-SCNC: 102 MEQ/L (ref 98–111)
CO2 SERPL-SCNC: 23 MEQ/L (ref 23–33)
CREAT SERPL-MCNC: 2.8 MG/DL (ref 0.4–1.2)
DEPRECATED RDW RBC AUTO: 55.7 FL (ref 35–45)
ERYTHROCYTE [DISTWIDTH] IN BLOOD BY AUTOMATED COUNT: 14.8 % (ref 11.5–14.5)
GFR SERPL CREATININE-BSD FRML MDRD: 25 ML/MIN/1.73M2
GLUCOSE BLD STRIP.AUTO-MCNC: 126 MG/DL (ref 70–108)
GLUCOSE BLD STRIP.AUTO-MCNC: 157 MG/DL (ref 70–108)
GLUCOSE BLD STRIP.AUTO-MCNC: 181 MG/DL (ref 70–108)
GLUCOSE BLD STRIP.AUTO-MCNC: 95 MG/DL (ref 70–108)
GLUCOSE SERPL-MCNC: 133 MG/DL (ref 70–108)
HCT VFR BLD AUTO: 31.4 % (ref 42–52)
HGB BLD-MCNC: 9.9 GM/DL (ref 14–18)
INR PPP: 1.14 (ref 0.85–1.13)
MCH RBC QN AUTO: 32.8 PG (ref 26–33)
MCHC RBC AUTO-ENTMCNC: 31.5 GM/DL (ref 32.2–35.5)
MCV RBC AUTO: 104 FL (ref 80–94)
PLATELET # BLD AUTO: 143 THOU/MM3 (ref 130–400)
PMV BLD AUTO: 9.6 FL (ref 9.4–12.4)
POTASSIUM SERPL-SCNC: 4.1 MEQ/L (ref 3.5–5.2)
RBC # BLD AUTO: 3.02 MILL/MM3 (ref 4.7–6.1)
SODIUM SERPL-SCNC: 136 MEQ/L (ref 135–145)
TROPONIN, HIGH SENSITIVITY: 158 NG/L (ref 0–12)
WBC # BLD AUTO: 7.1 THOU/MM3 (ref 4.8–10.8)

## 2023-08-31 PROCEDURE — 6370000000 HC RX 637 (ALT 250 FOR IP): Performed by: STUDENT IN AN ORGANIZED HEALTH CARE EDUCATION/TRAINING PROGRAM

## 2023-08-31 PROCEDURE — 99231 SBSQ HOSP IP/OBS SF/LOW 25: CPT | Performed by: STUDENT IN AN ORGANIZED HEALTH CARE EDUCATION/TRAINING PROGRAM

## 2023-08-31 PROCEDURE — 84484 ASSAY OF TROPONIN QUANT: CPT

## 2023-08-31 PROCEDURE — 85027 COMPLETE CBC AUTOMATED: CPT

## 2023-08-31 PROCEDURE — 85610 PROTHROMBIN TIME: CPT

## 2023-08-31 PROCEDURE — 1200000003 HC TELEMETRY R&B

## 2023-08-31 PROCEDURE — 82948 REAGENT STRIP/BLOOD GLUCOSE: CPT

## 2023-08-31 PROCEDURE — 36415 COLL VENOUS BLD VENIPUNCTURE: CPT

## 2023-08-31 PROCEDURE — 80048 BASIC METABOLIC PNL TOTAL CA: CPT

## 2023-08-31 PROCEDURE — 6360000002 HC RX W HCPCS

## 2023-08-31 PROCEDURE — 2580000003 HC RX 258: Performed by: STUDENT IN AN ORGANIZED HEALTH CARE EDUCATION/TRAINING PROGRAM

## 2023-08-31 RX ORDER — ALPRAZOLAM 0.5 MG/1
0.5 TABLET ORAL 2 TIMES DAILY PRN
Status: DISCONTINUED | OUTPATIENT
Start: 2023-08-31 | End: 2023-09-02 | Stop reason: HOSPADM

## 2023-08-31 RX ORDER — WARFARIN SODIUM 7.5 MG/1
7.5 TABLET ORAL
Status: DISPENSED | OUTPATIENT
Start: 2023-08-31 | End: 2023-09-01

## 2023-08-31 RX ADMIN — TICAGRELOR 90 MG: 90 TABLET ORAL at 09:34

## 2023-08-31 RX ADMIN — AMLODIPINE BESYLATE 5 MG: 5 TABLET ORAL at 09:34

## 2023-08-31 RX ADMIN — AMLODIPINE BESYLATE 5 MG: 5 TABLET ORAL at 19:58

## 2023-08-31 RX ADMIN — INSULIN GLARGINE 6 UNITS: 100 INJECTION, SOLUTION SUBCUTANEOUS at 20:04

## 2023-08-31 RX ADMIN — OXYCODONE AND ACETAMINOPHEN 1 TABLET: 5; 325 TABLET ORAL at 05:15

## 2023-08-31 RX ADMIN — ENOXAPARIN SODIUM 120 MG: 150 INJECTION SUBCUTANEOUS at 09:36

## 2023-08-31 RX ADMIN — ALPRAZOLAM 0.5 MG: 0.5 TABLET ORAL at 11:26

## 2023-08-31 RX ADMIN — Medication 100 MG: at 09:34

## 2023-08-31 RX ADMIN — TICAGRELOR 90 MG: 90 TABLET ORAL at 19:58

## 2023-08-31 RX ADMIN — SODIUM CHLORIDE, PRESERVATIVE FREE 10 ML: 5 INJECTION INTRAVENOUS at 09:37

## 2023-08-31 RX ADMIN — DOXAZOSIN 2 MG: 2 TABLET ORAL at 09:34

## 2023-08-31 RX ADMIN — BUMETANIDE 2 MG: 1 TABLET ORAL at 09:34

## 2023-08-31 RX ADMIN — OXYCODONE AND ACETAMINOPHEN 1 TABLET: 5; 325 TABLET ORAL at 13:50

## 2023-08-31 RX ADMIN — HYDRALAZINE HYDROCHLORIDE 50 MG: 50 TABLET, FILM COATED ORAL at 09:34

## 2023-08-31 RX ADMIN — MEXILETINE HYDROCHLORIDE 300 MG: 150 CAPSULE ORAL at 15:08

## 2023-08-31 RX ADMIN — OXYCODONE AND ACETAMINOPHEN 1 TABLET: 5; 325 TABLET ORAL at 19:58

## 2023-08-31 RX ADMIN — OXYCODONE AND ACETAMINOPHEN 1 TABLET: 5; 325 TABLET ORAL at 09:34

## 2023-08-31 RX ADMIN — METOPROLOL SUCCINATE 100 MG: 100 TABLET, EXTENDED RELEASE ORAL at 09:34

## 2023-08-31 RX ADMIN — SODIUM CHLORIDE, PRESERVATIVE FREE 10 ML: 5 INJECTION INTRAVENOUS at 19:58

## 2023-08-31 RX ADMIN — MEXILETINE HYDROCHLORIDE 300 MG: 150 CAPSULE ORAL at 09:34

## 2023-08-31 RX ADMIN — OXYCODONE AND ACETAMINOPHEN 1 TABLET: 5; 325 TABLET ORAL at 01:01

## 2023-08-31 RX ADMIN — MEXILETINE HYDROCHLORIDE 300 MG: 150 CAPSULE ORAL at 19:58

## 2023-08-31 RX ADMIN — ENOXAPARIN SODIUM 120 MG: 150 INJECTION SUBCUTANEOUS at 19:59

## 2023-08-31 RX ADMIN — ISOSORBIDE MONONITRATE 120 MG: 60 TABLET, EXTENDED RELEASE ORAL at 09:34

## 2023-08-31 ASSESSMENT — PAIN DESCRIPTION - LOCATION
LOCATION: RIB CAGE;NECK
LOCATION: RIB CAGE
LOCATION: RIB CAGE;NECK

## 2023-08-31 ASSESSMENT — PAIN DESCRIPTION - ONSET
ONSET: ON-GOING

## 2023-08-31 ASSESSMENT — PAIN DESCRIPTION - FREQUENCY
FREQUENCY: CONTINUOUS

## 2023-08-31 ASSESSMENT — PAIN DESCRIPTION - DESCRIPTORS
DESCRIPTORS: ACHING

## 2023-08-31 ASSESSMENT — PAIN SCALES - GENERAL
PAINLEVEL_OUTOF10: 8
PAINLEVEL_OUTOF10: 9
PAINLEVEL_OUTOF10: 8
PAINLEVEL_OUTOF10: 6
PAINLEVEL_OUTOF10: 8
PAINLEVEL_OUTOF10: 6
PAINLEVEL_OUTOF10: 9
PAINLEVEL_OUTOF10: 6
PAINLEVEL_OUTOF10: 8
PAINLEVEL_OUTOF10: 0

## 2023-08-31 ASSESSMENT — PAIN - FUNCTIONAL ASSESSMENT
PAIN_FUNCTIONAL_ASSESSMENT: PREVENTS OR INTERFERES SOME ACTIVE ACTIVITIES AND ADLS
PAIN_FUNCTIONAL_ASSESSMENT: ACTIVITIES ARE NOT PREVENTED
PAIN_FUNCTIONAL_ASSESSMENT: PREVENTS OR INTERFERES SOME ACTIVE ACTIVITIES AND ADLS
PAIN_FUNCTIONAL_ASSESSMENT: ACTIVITIES ARE NOT PREVENTED

## 2023-08-31 ASSESSMENT — PAIN DESCRIPTION - ORIENTATION
ORIENTATION: RIGHT
ORIENTATION: RIGHT
ORIENTATION: RIGHT;LEFT
ORIENTATION: RIGHT
ORIENTATION: RIGHT;LEFT
ORIENTATION: RIGHT
ORIENTATION: RIGHT

## 2023-08-31 ASSESSMENT — PAIN DESCRIPTION - PAIN TYPE
TYPE: ACUTE PAIN

## 2023-08-31 ASSESSMENT — PAIN SCALES - WONG BAKER
WONGBAKER_NUMERICALRESPONSE: 0
WONGBAKER_NUMERICALRESPONSE: 0

## 2023-08-31 NOTE — PALLIATIVE CARE
Initial Evaluation        Patient:   Christopher Adamson  YOB: 1965  Age:  62 y.o. Room:  [address] [place] [address]  MRN:  [id number]   Acct: [de-identified]    Date of Admission:  8/29/2023  8:57 AM  Date of Service:  8/31/2023  Completed By:  George Bishop RN                 Reason for Palliative Care Evaluation:-               [] Code Status Discussion              [] Goals of Care              [] Pain/Symptom Management               [] Emotional Support              [x] Other:  \"Needs legal guardian. Says family does not participate much. Non compliant with medications\".                    Current Issues:-   [x]  Pain - chronic back pain  []  Fatigue  []  Nausea  []  Anxiety  []  Depression  []  Shortness of Breath  []  Constipation  []  Appetite  []  Other:              Advance Directives:-    [] [facility] DNR Form  [x] Living Will  [x] Medical POA    Josesito Castillo Daughter/POA -     Danna Moore Daughter/Alt -               Current Code Status:-     [x] Full Resuscitation  [] DNR-Comfort Care-Arrest  [] DNR-Comfort Care       [] Limited Resuscitation             [] No CPR            [] No shock            [] No ET intubation/reintubation            [] No resuscitative medications            [] Other limitation:               Palliative Performance Status:-      [] 100%  Full ambulation; normal activity and work; no evidence of disease; able to do own self care; normal intake; fully conscious     [x] 90%   Full ambulation; normal activity and work; some evidence of disease; able to do own self care; normal intake; fully conscious    [] 80%   Full ambulation; normal activity with effort; some evidence of disease; able to do own self care; normal or reduced intake; fully conscious    [] 70%  Ambulation reduced; unable to perform normal job/work; significant  disease; able to do own self care; normal or reduced intake; fully conscious    [] 60%  Ambulation reduced; Significant with that. Patient stated \"just keep it all the way it is\". Patient seeming very frustrated. Encouraged patient to reach out if he ever changed his mind. Patient stating his daughters Namita Sheri him crap\" when he is admitted to the hospital. Patient shared his struggles with alcohol. 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 Patient states he has plenty of resources, still has a hard time with his addiction. Much emotional support given. Discusssed his daughters concern and anger due to being worried about his health. Patient stated his family is supportive. His son is coming up today to bring him a few items. Discussed patient not taking medications. Patient understands he needs his medications, but when he drinks he stops taking them. Again, patient stating he has plenty of resources for help. Encouraged patient to reach out to palliative if he had further questions or wanted to complete new ACP, patient agreed. Plan/Follow-Up:-    Patient wanting to keep current ACP documents as is. Patient understands significance of taking medications. Plans to reach out to his sponsors. Will follow PRN, please call if further needs arise.             Electronically signed by Darlene Quijano RN on 8/31/2023 at [facility] Office: [phone]

## 2023-08-31 NOTE — PROGRESS NOTES
26* 25*   CREATININE 3.1* 2.9* 2.8*   CALCIUM 8.3* 8.0* 8.2*       Recent Labs     08/29/23  1108   AST 40   ALT 28   BILIDIR <0.2   BILITOT 0.3   ALKPHOS 111       Recent Labs     08/29/23  1003 08/30/23  0442 08/31/23  0055   INR 0.88 0.98 1.14*       Recent Labs     08/30/23  0442   CKTOTAL 62         Urinalysis:    Lab Results   Component Value Date/Time    NITRU NEGATIVE 08/07/2023 03:10 PM    WBCUA 0-2 08/07/2023 03:10 PM    BACTERIA NONE SEEN 08/07/2023 03:10 PM    RBCUA 0-2 08/07/2023 03:10 PM    BLOODU MODERATE 08/07/2023 03:10 PM    SPECGRAV 1.016 04/22/2022 03:45 PM    GLUCOSEU 250 08/07/2023 03:10 PM     Radiology:   CT CSpine W/O Contrast   Final Result    No fracture. **This report has been created using voice recognition software. It may contain minor errors which are inherent in voice recognition technology. **      Final report electronically signed by Dr. Annelise Betts on 8/29/2023 11:19 AM      CT HEAD WO CONTRAST   Final Result    No evidence of an acute process. No change from prior. **This report has been created using voice recognition software. It may contain minor errors which are inherent in voice recognition technology. **      Final report electronically signed by Dr. Annelise Betts on 8/29/2023 11:14 AM      XR RIBS RIGHT INCLUDE CHEST (MIN 3 VIEWS)   Final Result   1. No acute bony abnormality            **This report has been created using voice recognition software. It may contain minor errors which are inherent in voice recognition technology. **      Final report electronically signed by Dr Keri Henson on 8/29/2023 10:31 AM        XR RIBS RIGHT INCLUDE CHEST (MIN 3 VIEWS)    Result Date: 8/29/2023  PROCEDURE: XR RIBS RIGHT INCLUDE CHEST (MIN 3 VIEWS) CLINICAL INFORMATION: fall, rib pain COMPARISON: Chest radiograph 11/23/2022 TECHNIQUE: 5 view rib series performed. FINDINGS: The lungs are clear. No pleural effusions. No pneumothorax. Mild cardiomegaly. Cardiac conduction device is seen with 2 leads. The leads are intact. Median sternotomy has been performed. No acute fracture or dislocation. Bone mineralization is unremarkable. Degenerative changes of the thoracic spine No significant soft tissue abnormality. 1. No acute bony abnormality **This report has been created using voice recognition software. It may contain minor errors which are inherent in voice recognition technology. ** Final report electronically signed by Dr Ilene Bain on 8/29/2023 10:31 AM    CT HEAD WO CONTRAST    Result Date: 8/29/2023  PROCEDURE: CT HEAD WO CONTRAST CLINICAL INFORMATION: fall, LOC. COMPARISON: Head CT 8/7/2023. TECHNIQUE: Noncontrast 5 mm axial images were obtained through the brain. Sagittal and coronal reconstructions were obtained. . All CT scans at this facility use dose modulation, iterative reconstruction, and/or weight-based dosing when appropriate to reduce radiation dose to as low as reasonably achievable. FINDINGS: The brain volume is normal. There is no hemorrhage. There are no intra-or extra-axial collections. There is no hydrocephalus, midline shift or mass effect. The gray-white matter differentiation is preserved. There are vascular calcifications. There is some mild mucosal thickening in the maxillary sinuses. There is no suspicious calvarial abnormality. There is no significant change compared to the prior study. No evidence of an acute process. No change from prior. **This report has been created using voice recognition software. It may contain minor errors which are inherent in voice recognition technology. ** Final report electronically signed by Dr. Kalie Gomez on 8/29/2023 11:14 AM    CT CSpine W/O Contrast    Result Date: 8/29/2023  PROCEDURE: CT CERVICAL SPINE WO CONTRAST CLINICAL INFORMATION: neck pain. Michelle Mendez. COMPARISON: CT cervical spine 8/7/2023.  TECHNIQUE: 3 mm noncontrast axial images were obtained through the cervical spine with

## 2023-08-31 NOTE — TELEPHONE ENCOUNTER
Spoke with patient and he stated he called [facility] and they said he needs a letter with the letter head on it stating that patient is current admitted, date of admission and what his diagnosis is. Patient stated they will need this letter faxed to them [phone] and it will also need to be signed by a MD. He wondered if Dr. Mary Mclean would also sign the letter.

## 2023-08-31 NOTE — TELEPHONE ENCOUNTER
Patient called and stated that he is back in the hospital. He was to go back to work on Monday 8/28 however he ended up back in the hospital. He is asking for us to extended his time off work. He not sure when he will discharged, he believes it will today or tomorrow. He stated that he will call [facility] will fax over another form if needed. Patient also mentioned that the told him that they need a MD to sign the form also. Patient would like a call back once the [facility] form is faxed to [facility].

## 2023-08-31 NOTE — TELEPHONE ENCOUNTER
Called [facility] to inform that patient was currently admitted and once patient was discharged we will see him in the office and determine return to work.      [address], claim [id number]

## 2023-08-31 NOTE — PROGRESS NOTES
Physician Progress Note      Rohit Bowman  CSN #:                  [id number]  :                       1965  ADMIT DATE:       2023 8:57 AM  DISCH DATE:  RESPONDING  PROVIDER #:        Juanis Guerin          QUERY TEXT:    Patient admitted with HTN urgency. Pt noted to have Long-Standing Persistent   Atrial Fibrillation with [id number] of 4 (CHF, HTN, DM, hx MI) and is   maintained on Coumadin and Brilinta. If possible, please document in progress   notes and discharge summary if you are evaluating and/or treating any of the   following: The medical record reflects the following:  Risk Factors: DM, HTN, CHF, hx MI  Clinical Indicators: H&P states, ? Long-Standing Persistent Atrial   Fibrillation: CHADS-VASc 4.?  Treatment: Brilinta, Coumadin, Lovenox bridge    Thank you! Ross Tejeda RN, BSN, RHIT, CCDS  Clinical   Options provided:  -- Secondary hypercoagulable state in a patient with atrial fibrillation  -- Other - I will add my own diagnosis  -- Disagree - Not applicable / Not valid  -- Disagree - Clinically unable to determine / Unknown  -- Refer to Clinical Documentation Reviewer    PROVIDER RESPONSE TEXT:    This patient has secondary hypercoagulable state in a patient with atrial   fibrillation.     Query created by: Ross Tejeda on 2023 12:30 PM      Electronically signed by:  Juanis Guerin 2023 12:33 PM

## 2023-08-31 NOTE — PLAN OF CARE
Problem: Discharge Planning  Goal: Discharge to home or other facility with appropriate resources  Outcome: Progressing  Flowsheets (Taken 8/31/2023 0058)  Discharge to home or other facility with appropriate resources:   Identify barriers to discharge with patient and caregiver   Arrange for needed discharge resources and transportation as appropriate   Identify discharge learning needs (meds, wound care, etc)     Problem: Pain  Goal: Verbalizes/displays adequate comfort level or baseline comfort level  Outcome: Progressing  Flowsheets (Taken 8/31/2023 0058)  Verbalizes/displays adequate comfort level or baseline comfort level:   Encourage patient to monitor pain and request assistance   Assess pain using appropriate pain scale   Administer analgesics based on type and severity of pain and evaluate response     Problem: Respiratory - Adult  Goal: Achieves optimal ventilation and oxygenation  Outcome: Progressing  Flowsheets (Taken 8/31/2023 0058)  Achieves optimal ventilation and oxygenation: Assess for changes in respiratory status     Problem: Cardiovascular - Adult  Goal: Maintains optimal cardiac output and hemodynamic stability  Outcome: Progressing  Flowsheets (Taken 8/31/2023 0058)  Maintains optimal cardiac output and hemodynamic stability:   Monitor blood pressure and heart rate   Assess for signs of decreased cardiac output  Goal: Absence of cardiac dysrhythmias or at baseline  Outcome: Progressing  Flowsheets (Taken 8/31/2023 0058)  Absence of cardiac dysrhythmias or at baseline:   Monitor cardiac rate and rhythm   Assess for signs of decreased cardiac output   Administer antiarrhythmia medication and electrolyte replacement as ordered     Problem: Skin/Tissue Integrity - Adult  Goal: Skin integrity remains intact  Outcome: Progressing  Flowsheets (Taken 8/31/2023 0058)  Skin Integrity Remains Intact: Monitor for areas of redness and/or skin breakdown     Problem: Infection - Adult  Goal: Absence of infection during hospitalization  Outcome: Progressing  Flowsheets (Taken 8/31/2023 0058)  Absence of infection during hospitalization:   Assess and monitor for signs and symptoms of infection   Monitor lab/diagnostic results     Problem: Metabolic/Fluid and Electrolytes - Adult  Goal: Electrolytes maintained within normal limits  Outcome: Progressing  Flowsheets (Taken 8/31/2023 0058)  Electrolytes maintained within normal limits:   Monitor labs and assess patient for signs and symptoms of electrolyte imbalances   Administer electrolyte replacement as ordered  Goal: Hemodynamic stability and optimal renal function maintained  Outcome: Progressing  Flowsheets (Taken 8/31/2023 0058)  Hemodynamic stability and optimal renal function maintained:   Monitor labs and assess for signs and symptoms of volume excess or deficit   Monitor intake, output and patient weight     Problem: Safety - Adult  Goal: Free from fall injury  Outcome: Progressing  Flowsheets (Taken 8/31/2023 0058)  Free From Fall Injury: Instruct family/caregiver on patient safety     Problem: Chronic Conditions and Co-morbidities  Goal: Patient's chronic conditions and co-morbidity symptoms are monitored and maintained or improved  Outcome: Progressing  Flowsheets (Taken 8/31/2023 0058)  Care Plan - Patient's Chronic Conditions and Co-Morbidity Symptoms are Monitored and Maintained or Improved:   Monitor and assess patient's chronic conditions and comorbid symptoms for stability, deterioration, or improvement   Collaborate with multidisciplinary team to address chronic and comorbid conditions and prevent exacerbation or deterioration   Update acute care plan with appropriate goals if chronic or comorbid symptoms are exacerbated and prevent overall improvement and discharge     Care plan reviewed with patient. Patient verbalizes understanding of the care plan and contributed to goal setting.

## 2023-08-31 NOTE — PROGRESS NOTES
Pt was alone as he was dealing with right-sided chest wall pain. He was very weak but wanted prayer to cope and heal. He was blessed.     08/31/23 1455   Encounter Summary   Encounter Overview/Reason  Initial Encounter   Service Provided For: Patient   Referral/Consult From: [address] Family members   Last Encounter  08/31/23   Complexity of Encounter Low   Begin Time 1330   End Time  1335   Total Time Calculated 5 min   Spiritual/Emotional needs   Type Spiritual Support   Assessment/Intervention/Outcome   Assessment Hopeful   Intervention Empowerment

## 2023-08-31 NOTE — PROGRESS NOTES
Warfarin Pharmacy Consult Note  Warfarin Indication: Atrial fibrillation or Atrial flutter  Target INR: 2.0-3.0  Dose prior to admission: Waiting on confirmation from Darya Mclain Donald     08/29/23  1003 08/30/23  0442 08/31/23  0055   INR 0.88 0.98 1.14*     Recent Labs     08/29/23  0915 08/29/23  1600 08/31/23  0055   HGB 10.5* 9.6* 9.9*    162 143     Concurrent anticoagulants/antiplatelets: ticagrelor  Significant warfarin drug-drug interactions: phenobarbital     Date INR Warfarin Dose   8/29/23 0.88 7.5 mg   8/30/23  0.98  7.5 mg    8/31/23   1.14  7.5 mg                                     Monitoring:                   INR will be monitored daily until therapeutic INR is achieved.     **Please contact pharmacy for discharge instructions when indicated**    Yury Patterson, PharmD  8/31/2023 9:16 AM

## 2023-09-01 LAB
ANION GAP SERPL CALC-SCNC: 8 MEQ/L (ref 8–16)
BUN SERPL-MCNC: 25 MG/DL (ref 7–22)
CALCIUM SERPL-MCNC: 7.9 MG/DL (ref 8.5–10.5)
CHLORIDE SERPL-SCNC: 107 MEQ/L (ref 98–111)
CO2 SERPL-SCNC: 24 MEQ/L (ref 23–33)
CREAT SERPL-MCNC: 3 MG/DL (ref 0.4–1.2)
GFR SERPL CREATININE-BSD FRML MDRD: 23 ML/MIN/1.73M2
GLUCOSE BLD STRIP.AUTO-MCNC: 120 MG/DL (ref 70–108)
GLUCOSE BLD STRIP.AUTO-MCNC: 170 MG/DL (ref 70–108)
GLUCOSE BLD STRIP.AUTO-MCNC: 196 MG/DL (ref 70–108)
GLUCOSE BLD STRIP.AUTO-MCNC: 317 MG/DL (ref 70–108)
GLUCOSE SERPL-MCNC: 111 MG/DL (ref 70–108)
INR PPP: 1.43 (ref 0.85–1.13)
POTASSIUM SERPL-SCNC: 4.1 MEQ/L (ref 3.5–5.2)
SODIUM SERPL-SCNC: 139 MEQ/L (ref 135–145)

## 2023-09-01 PROCEDURE — 97116 GAIT TRAINING THERAPY: CPT

## 2023-09-01 PROCEDURE — 6370000000 HC RX 637 (ALT 250 FOR IP): Performed by: STUDENT IN AN ORGANIZED HEALTH CARE EDUCATION/TRAINING PROGRAM

## 2023-09-01 PROCEDURE — 6360000002 HC RX W HCPCS

## 2023-09-01 PROCEDURE — 80048 BASIC METABOLIC PNL TOTAL CA: CPT

## 2023-09-01 PROCEDURE — 1200000003 HC TELEMETRY R&B

## 2023-09-01 PROCEDURE — 2580000003 HC RX 258: Performed by: STUDENT IN AN ORGANIZED HEALTH CARE EDUCATION/TRAINING PROGRAM

## 2023-09-01 PROCEDURE — 97162 PT EVAL MOD COMPLEX 30 MIN: CPT

## 2023-09-01 PROCEDURE — 85610 PROTHROMBIN TIME: CPT

## 2023-09-01 PROCEDURE — 82948 REAGENT STRIP/BLOOD GLUCOSE: CPT

## 2023-09-01 PROCEDURE — 99232 SBSQ HOSP IP/OBS MODERATE 35: CPT | Performed by: STUDENT IN AN ORGANIZED HEALTH CARE EDUCATION/TRAINING PROGRAM

## 2023-09-01 PROCEDURE — 36415 COLL VENOUS BLD VENIPUNCTURE: CPT

## 2023-09-01 RX ORDER — WARFARIN SODIUM 7.5 MG/1
7.5 TABLET ORAL
Status: COMPLETED | OUTPATIENT
Start: 2023-09-01 | End: 2023-09-01

## 2023-09-01 RX ADMIN — AMLODIPINE BESYLATE 5 MG: 5 TABLET ORAL at 08:14

## 2023-09-01 RX ADMIN — TICAGRELOR 90 MG: 90 TABLET ORAL at 21:50

## 2023-09-01 RX ADMIN — TICAGRELOR 90 MG: 90 TABLET ORAL at 08:12

## 2023-09-01 RX ADMIN — OXYCODONE AND ACETAMINOPHEN 1 TABLET: 5; 325 TABLET ORAL at 00:12

## 2023-09-01 RX ADMIN — INSULIN GLARGINE 6 UNITS: 100 INJECTION, SOLUTION SUBCUTANEOUS at 21:51

## 2023-09-01 RX ADMIN — METOPROLOL SUCCINATE 100 MG: 100 TABLET, EXTENDED RELEASE ORAL at 08:15

## 2023-09-01 RX ADMIN — MEXILETINE HYDROCHLORIDE 300 MG: 150 CAPSULE ORAL at 15:51

## 2023-09-01 RX ADMIN — BUMETANIDE 2 MG: 1 TABLET ORAL at 08:12

## 2023-09-01 RX ADMIN — INSULIN LISPRO 4 UNITS: 100 INJECTION, SOLUTION INTRAVENOUS; SUBCUTANEOUS at 21:50

## 2023-09-01 RX ADMIN — ENOXAPARIN SODIUM 120 MG: 150 INJECTION SUBCUTANEOUS at 21:50

## 2023-09-01 RX ADMIN — MEXILETINE HYDROCHLORIDE 300 MG: 150 CAPSULE ORAL at 21:50

## 2023-09-01 RX ADMIN — OXYCODONE AND ACETAMINOPHEN 1 TABLET: 5; 325 TABLET ORAL at 13:13

## 2023-09-01 RX ADMIN — Medication 100 MG: at 08:14

## 2023-09-01 RX ADMIN — OXYCODONE AND ACETAMINOPHEN 1 TABLET: 5; 325 TABLET ORAL at 05:02

## 2023-09-01 RX ADMIN — OXYCODONE AND ACETAMINOPHEN 1 TABLET: 5; 325 TABLET ORAL at 21:50

## 2023-09-01 RX ADMIN — WARFARIN SODIUM 7.5 MG: 7.5 TABLET ORAL at 17:38

## 2023-09-01 RX ADMIN — DOXAZOSIN 2 MG: 2 TABLET ORAL at 08:12

## 2023-09-01 RX ADMIN — AMLODIPINE BESYLATE 5 MG: 5 TABLET ORAL at 21:50

## 2023-09-01 RX ADMIN — OXYCODONE AND ACETAMINOPHEN 1 TABLET: 5; 325 TABLET ORAL at 09:09

## 2023-09-01 RX ADMIN — SODIUM CHLORIDE, PRESERVATIVE FREE 10 ML: 5 INJECTION INTRAVENOUS at 08:16

## 2023-09-01 RX ADMIN — ISOSORBIDE MONONITRATE 120 MG: 60 TABLET, EXTENDED RELEASE ORAL at 08:13

## 2023-09-01 RX ADMIN — ENOXAPARIN SODIUM 120 MG: 150 INJECTION SUBCUTANEOUS at 08:14

## 2023-09-01 RX ADMIN — OXYCODONE AND ACETAMINOPHEN 1 TABLET: 5; 325 TABLET ORAL at 17:38

## 2023-09-01 RX ADMIN — MEXILETINE HYDROCHLORIDE 300 MG: 150 CAPSULE ORAL at 08:13

## 2023-09-01 ASSESSMENT — PAIN SCALES - WONG BAKER
WONGBAKER_NUMERICALRESPONSE: 2

## 2023-09-01 ASSESSMENT — PAIN DESCRIPTION - ORIENTATION
ORIENTATION: RIGHT

## 2023-09-01 ASSESSMENT — PAIN DESCRIPTION - LOCATION
LOCATION: RIB CAGE

## 2023-09-01 ASSESSMENT — PAIN - FUNCTIONAL ASSESSMENT: PAIN_FUNCTIONAL_ASSESSMENT: ACTIVITIES ARE NOT PREVENTED

## 2023-09-01 ASSESSMENT — PAIN DESCRIPTION - DESCRIPTORS
DESCRIPTORS: ACHING

## 2023-09-01 ASSESSMENT — PAIN SCALES - GENERAL
PAINLEVEL_OUTOF10: 9
PAINLEVEL_OUTOF10: 8
PAINLEVEL_OUTOF10: 6
PAINLEVEL_OUTOF10: 6
PAINLEVEL_OUTOF10: 9
PAINLEVEL_OUTOF10: 8
PAINLEVEL_OUTOF10: 9
PAINLEVEL_OUTOF10: 8
PAINLEVEL_OUTOF10: 6
PAINLEVEL_OUTOF10: 8

## 2023-09-01 ASSESSMENT — PAIN DESCRIPTION - PAIN TYPE: TYPE: ACUTE PAIN

## 2023-09-01 NOTE — PROGRESS NOTES
Internal Medicine Resident Progress Note    Name: Eliceo Castillo, male, : 1965, MRN: [id number]    PCP: ISABELLA Chaparro - CNP    Date of Admission: 2023  Date of Service: Pt seen/examined on 23      Assessment/Plan:     - Hypertensive urgency: Resolved. Patient's blood pressure has remained stable. Blood pressure initially on presentation was elevated at 133/98. This is secondary to patient's medical noncompliance with his home regimen. Patient openly admits to not taking his home medications. Initially on nitro infusion which has been weaned off.  -Continuing home medication of Bumex, metoprolol, Imdur, hydralazine, Cardura. Chest wall pain, right: Stable. Likely MSK pain. Patient states he status post mechanical fall while he was intoxicated state. ED obtained x-rays which were negative for fractures. - Acute alcohol intoxication: Present on admission. Initial PAWS score 5. Serum alcohol level on admission was, 0.13. Second hospitalization in 1 month for same thing   -Seizure/fall precautions in place.  -Addictive/social service place.  -Continuing multivitamin, thiamine folic acid.  -Continuing home Xanax 0.5 mg twice daily  -Phenobarbital prophylaxis patient does not wish to proceed with.   - High anion gap metabolic acidosis: Resolved. Secondary to lactic acidosis. This is due to patient's alcohol intake and poor oral intake. Continue to trend with daily BMP. - Multivessel coronary artery disease: S/p PCI to LAD, diagonal CABG. On Brilinta, Toprol, Imdur but has history of noncompliance of medications. We will continue medical management at this time due to patient's history of noncompliance. - CKD stage IV: Currently stable. Continue to monitor. Avoid nephrotoxic agents as possible. Continue to trend with daily BMP   - Chronic systolic HFrEF: Most recent EF 25 to 30%. S/p ICD.   Echo 2022 showed EF 30% with severely reduced systolic function severe global

## 2023-09-01 NOTE — PROGRESS NOTES
Warfarin Pharmacy Consult Note    Warfarin Indication: Atrial fibrillation or Atrial flutter  Target INR: 2.0-3.0  Dose prior to admission: 2.5 mg MWF, 5 mg TThSS    Recent Labs     08/30/23  0442 08/31/23  0055 09/01/23  0451   INR 0.98 1.14* 1.43*     Recent Labs     08/29/23  0915 08/29/23  1600 08/31/23  0055   HGB 10.5* 9.6* 9.9*    162 143     Concurrent anticoagulants/antiplatelets: ticagrelor  Significant warfarin drug-drug interactions: phenobarbital     Date INR Warfarin Dose   8/29/23 0.88 7.5 mg   8/30/23  0.98  7.5 mg    8/31/23   1.14   Not Given    9/1/2023  1.43  7.5 mg                                Monitoring:                   INR will be monitored daily until therapeutic INR is achieved.     **Please contact pharmacy for discharge instructions when indicated**    Maurisio MorejonD, BCPS  9/1/2023  7:51 AM

## 2023-09-01 NOTE — CARE COORDINATION
9/1/23, 2:30 PM EDT    DISCHARGE ON GOING EVALUATION    Humphrey Madison day: 3  Location: -09/009-A Reason for admit: Chest pain [R07.9]  Elevated troponin [R77.8]  Alcohol withdrawal syndrome without complication ([address]) [D10.534]     Procedure:   8/29 CT cerv spine: slight anterolisthesis of C3 on C4. There is a mild anterior angulation of C4 on C5. There is loss of disc height at the C5-6 and C6-7 levels      Barriers to Discharge: Hospitalist following. PT/OT. Palliative. Refusing phenobarbital. BP management. Creat worsening at 3.0 today. BUN 25. GFR 23. Serum calcium 7.9. PCP: ISABELLA Barrett CNP  Readmission Risk Score: 28.6%    Patient Goals/Plan/Treatment Preferences: From home alone and plans to return w support from children. Has cane and walker PRN. Denies needs and services.

## 2023-09-01 NOTE — PROGRESS NOTES
[facility]  INPATIENT PHYSICAL THERAPY  EVALUATION  STRZ RENAL TELEMETRY 6K - 4A-46/743-R    Time In: 08  Time Out: 08  Timed Code Treatment Minutes: 12 Minutes  Minutes: 21          Date: 2023  Patient Name: Pinky Zuleta,  Gender:  male        MRN: [id number]  : 1965  (62 y.o.)      Referring Practitioner: Surekha James DO  Diagnosis: Chest pain  Additional Pertinent Hx: 62year old male with hx ICM s/p CABG, PCI to LAD (on Brilinta), HFrEF 25-30%, Afib (on Amio, Mexilitine, Coumadin), NIDDM2, CKD4, polysubstance abuse (opioids, alcohol), noncompliance, who presents to [facility] for right chest wall pain after suffering from fall. Did not lose consciousness. Has been falling a lot lately due to intoxication. Restrictions/Precautions:  Restrictions/Precautions: Fall Risk    Subjective:  Chart Reviewed: Yes  Patient assessed for rehabilitation services?: Yes  Subjective: RN approved session.  Pt pleasant and agreeable to therapy    General:  Overall Orientation Status: Within Functional Limits  Vision: Within Functional Limits  Hearing: Within functional limits       Pain: does not rate but does c/o R rib pain     Vitals: Nurse checked vitals prior to session    Social/Functional History:    Lives With: Alone  Type of Home: Condo  Home Layout: One level  Home Access: Stairs to enter without rails  Entrance Stairs - Number of Steps: 2  Home Equipment: None             ADL Assistance: Independent  Homemaking Assistance: Independent  Ambulation Assistance: Independent  Transfer Assistance: Independent    Active : Yes  Occupation: Full time employment       OBJECTIVE:  Range of Motion:  Bilateral Lower Extremity: WFL    Strength:  Bilateral Lower Extremity: Impaired - deconditioned     Balance:  Static Sitting Balance:  Supervision  Static Standing Balance: Stand By Assistance    Bed Mobility:  Not Tested    Transfers:  Sit to Stand: Stand By Assistance  Stand to Sit:Stand By

## 2023-09-02 VITALS
TEMPERATURE: 97.7 F | BODY MASS INDEX: 35.31 KG/M2 | OXYGEN SATURATION: 97 % | WEIGHT: 275.13 LBS | HEIGHT: 74 IN | SYSTOLIC BLOOD PRESSURE: 126 MMHG | RESPIRATION RATE: 17 BRPM | DIASTOLIC BLOOD PRESSURE: 79 MMHG | HEART RATE: 62 BPM

## 2023-09-02 LAB
GLUCOSE BLD STRIP.AUTO-MCNC: 155 MG/DL (ref 70–108)
GLUCOSE BLD STRIP.AUTO-MCNC: 179 MG/DL (ref 70–108)
INR PPP: 1.36 (ref 0.85–1.13)

## 2023-09-02 PROCEDURE — 6370000000 HC RX 637 (ALT 250 FOR IP): Performed by: STUDENT IN AN ORGANIZED HEALTH CARE EDUCATION/TRAINING PROGRAM

## 2023-09-02 PROCEDURE — 6370000000 HC RX 637 (ALT 250 FOR IP): Performed by: PHARMACIST

## 2023-09-02 PROCEDURE — 82948 REAGENT STRIP/BLOOD GLUCOSE: CPT

## 2023-09-02 PROCEDURE — 6360000002 HC RX W HCPCS

## 2023-09-02 PROCEDURE — 36415 COLL VENOUS BLD VENIPUNCTURE: CPT

## 2023-09-02 PROCEDURE — 99239 HOSP IP/OBS DSCHRG MGMT >30: CPT | Performed by: STUDENT IN AN ORGANIZED HEALTH CARE EDUCATION/TRAINING PROGRAM

## 2023-09-02 PROCEDURE — 85610 PROTHROMBIN TIME: CPT

## 2023-09-02 RX ORDER — WARFARIN SODIUM 7.5 MG/1
7.5 TABLET ORAL ONCE
Status: COMPLETED | OUTPATIENT
Start: 2023-09-02 | End: 2023-09-02

## 2023-09-02 RX ORDER — ENOXAPARIN SODIUM 150 MG/ML
1 INJECTION SUBCUTANEOUS EVERY 12 HOURS
Qty: 8 EACH | Refills: 0 | Status: SHIPPED | OUTPATIENT
Start: 2023-09-02 | End: 2023-09-02 | Stop reason: SDUPTHER

## 2023-09-02 RX ORDER — ENOXAPARIN SODIUM 150 MG/ML
120 INJECTION SUBCUTANEOUS EVERY 12 HOURS
Qty: 8 EACH | Refills: 0 | Status: SHIPPED | OUTPATIENT
Start: 2023-09-02 | End: 2023-09-06

## 2023-09-02 RX ORDER — MEXILETINE HYDROCHLORIDE 150 MG/1
300 CAPSULE ORAL 3 TIMES DAILY
Qty: 90 CAPSULE | Refills: 1 | Status: SHIPPED | OUTPATIENT
Start: 2023-09-02 | End: 2023-10-02

## 2023-09-02 RX ORDER — MEXILETINE HYDROCHLORIDE 150 MG/1
300 CAPSULE ORAL 3 TIMES DAILY
Qty: 90 CAPSULE | Refills: 3 | Status: SHIPPED | OUTPATIENT
Start: 2023-09-02 | End: 2023-09-02 | Stop reason: SDUPTHER

## 2023-09-02 RX ORDER — WARFARIN SODIUM 5 MG/1
5 TABLET ORAL DAILY
Qty: 30 TABLET | Refills: 1 | Status: SHIPPED | OUTPATIENT
Start: 2023-09-02 | End: 2023-11-01

## 2023-09-02 RX ORDER — OXYCODONE HYDROCHLORIDE AND ACETAMINOPHEN 5; 325 MG/1; MG/1
1 TABLET ORAL EVERY 6 HOURS PRN
Qty: 6 TABLET | Refills: 0 | Status: SHIPPED | OUTPATIENT
Start: 2023-09-02 | End: 2023-09-06

## 2023-09-02 RX ADMIN — OXYCODONE AND ACETAMINOPHEN 1 TABLET: 5; 325 TABLET ORAL at 09:54

## 2023-09-02 RX ADMIN — BUMETANIDE 2 MG: 1 TABLET ORAL at 08:34

## 2023-09-02 RX ADMIN — DOXAZOSIN 2 MG: 2 TABLET ORAL at 08:35

## 2023-09-02 RX ADMIN — TICAGRELOR 90 MG: 90 TABLET ORAL at 08:36

## 2023-09-02 RX ADMIN — MEXILETINE HYDROCHLORIDE 300 MG: 150 CAPSULE ORAL at 08:35

## 2023-09-02 RX ADMIN — METOPROLOL SUCCINATE 100 MG: 100 TABLET, EXTENDED RELEASE ORAL at 08:35

## 2023-09-02 RX ADMIN — OXYCODONE AND ACETAMINOPHEN 1 TABLET: 5; 325 TABLET ORAL at 03:22

## 2023-09-02 RX ADMIN — WARFARIN SODIUM 7.5 MG: 7.5 TABLET ORAL at 15:23

## 2023-09-02 RX ADMIN — Medication 100 MG: at 08:36

## 2023-09-02 RX ADMIN — ENOXAPARIN SODIUM 120 MG: 150 INJECTION SUBCUTANEOUS at 08:35

## 2023-09-02 RX ADMIN — AMLODIPINE BESYLATE 5 MG: 5 TABLET ORAL at 08:34

## 2023-09-02 RX ADMIN — MEXILETINE HYDROCHLORIDE 300 MG: 150 CAPSULE ORAL at 14:11

## 2023-09-02 RX ADMIN — OXYCODONE AND ACETAMINOPHEN 1 TABLET: 5; 325 TABLET ORAL at 14:13

## 2023-09-02 RX ADMIN — ISOSORBIDE MONONITRATE 120 MG: 60 TABLET, EXTENDED RELEASE ORAL at 08:35

## 2023-09-02 ASSESSMENT — PAIN SCALES - WONG BAKER
WONGBAKER_NUMERICALRESPONSE: 2
WONGBAKER_NUMERICALRESPONSE: 2
WONGBAKER_NUMERICALRESPONSE: 0
WONGBAKER_NUMERICALRESPONSE: 2
WONGBAKER_NUMERICALRESPONSE: 0
WONGBAKER_NUMERICALRESPONSE: 2
WONGBAKER_NUMERICALRESPONSE: 0
WONGBAKER_NUMERICALRESPONSE: 2
WONGBAKER_NUMERICALRESPONSE: 0
WONGBAKER_NUMERICALRESPONSE: 0
WONGBAKER_NUMERICALRESPONSE: 2
WONGBAKER_NUMERICALRESPONSE: 0
WONGBAKER_NUMERICALRESPONSE: 0
WONGBAKER_NUMERICALRESPONSE: 2

## 2023-09-02 ASSESSMENT — PAIN DESCRIPTION - LOCATION: LOCATION: RIB CAGE

## 2023-09-02 ASSESSMENT — PAIN DESCRIPTION - DESCRIPTORS: DESCRIPTORS: ACHING;DISCOMFORT

## 2023-09-02 ASSESSMENT — PAIN SCALES - GENERAL
PAINLEVEL_OUTOF10: 3
PAINLEVEL_OUTOF10: 5
PAINLEVEL_OUTOF10: 8
PAINLEVEL_OUTOF10: 3
PAINLEVEL_OUTOF10: 5
PAINLEVEL_OUTOF10: 3

## 2023-09-02 ASSESSMENT — PAIN DESCRIPTION - ORIENTATION: ORIENTATION: RIGHT

## 2023-09-02 NOTE — PROGRESS NOTES
Clinical Pharmacy Note                                               Warfarin Discharge Recommendations      Patient discharged from [facility] today on warfarin.     Warfarin indication: Atrial fibrillation or Atrial flutter  INR goal during admission: 2.0-3.0  Interacting medications at discharge: Enoxaparin 1 mg/kg Q12H   Coumadin 5 mg tabs- last fill was 4/2/2022- recommended Dr Nicole Lynch prescribe warfarin 5 mg tablets for discharge    Recent INRs:  Recent Labs     08/31/23  0055 09/01/23  0451 09/02/23  0948   INR 1.14* 1.43* 1.36*     Recommendations for discharge:   Date Warfarin Dose   9/2/2023 7.5 mg (prior to discharge)    9/3/2023 5 mg    9/4/2023 5 mg   9/5/2023 2.5 mg    9/6/2023 INR     Continue Enoxaparin 120 mg every 12 hours until INR on 9/6/2023    Provider dosing warfarin: Pham Pollard ([phone])     Recheck INR:  Call Pham Pollard on Tuesday, 9/5/23 morning to request appointment to check INR on Wednesday, 9/6/23    John Clos PharmD 9/2/2023 2:11 PM

## 2023-09-02 NOTE — DISCHARGE INSTR - MEDS
Date Warfarin Dose   9/3/2023 5 mg    9/4/2023 5 mg   9/5/2023 2.5 mg    9/6/2023 INR     Continue Enoxaparin 120 mg every 12 hours until INR on 9/6/2023    Provider dosing warfarin: Pham Pollard ([phone])     Recheck INR:  Call Pham Pollard on Tuesday, 9/5/23 morning to request appointment to check INR on Wednesday, 9/6/23

## 2023-09-02 NOTE — DISCHARGE SUMMARY
Discharge Summary     Patient Identification:  Ronald Ugarte  : 1965  MRN: [id number]   Account: [de-identified]     Admit date: 2023  Discharge date: 23   Attending provider: Manpreet Jimenez MD        Primary care provider: ISABELLA Sanz - CNP     Discharge Diagnoses:     - Hypertensive urgency: Resolved. Remained stable. Initially on presentation was elevated at 133/98. 2/2 patient's medical noncompliance with his home regimen. Openly admits to not taking his home medications. Initially on nitro infusion which has been weaned off.  -Continuing home medication of Bumex, metoprolol, Imdur, hydralazine, Cardura. - Atrial fibrillation, persistent: [id number] 4.  Status post ablation in 2017. Currently NSR with heavy PVCs. Patient is noncompliant on his home Coumadin. INR on presentation 0.8. Continue patient's home amiodarone duloxetine. Continuing Lovenox and bridging him to therapeutic levels on Coumadin continue to trend patient's INR.  -Patient to be discharged on Lovenox to Coumadin bridge.  -Patient be discharged with 4 days of Lovenox. And pharmacy was consulted for instructions on Coumadin for patient for dosing.  -Patient follow-up with Coumadin clinic in approximately 3 to 4 days. This was heavily discussed with patient. Patient previously followed up with [address] [place] [address] Coumadin clinic. Gave patient contact information for [facility] Coumadin clinic. Stressed the importance of following up with this. Patient agrees at this time. Additionally discussed gave patient the contact information for [facility] Coumadin clinic if this is easier for her to refer him to get his INR checked and dosing.  -Patient to follow-up with Coumadin clinic for continued management of warfarin. Patient also follow-up with PCP. -Medical compliance of anticoagulation was heavily stressed with patient    Chest wall pain, right: Stable. Likely MSK pain.   Patient states he status post mechanical 0.85 - 1.13   POCT glucose    Collection Time: 09/02/23 11:33 AM   Result Value Ref Range    POC Glucose 179 (H) 70 - 108 mg/dl     Discharge condition: stable  Disposition: Home  Time spent on discharge: 30 minutes    Electronically signed by Roberta Pinto DO, PGY 2 on 9/2/2023 at 2:27 PM    This case was discussed with attending, Dr. Peg Segovia MD.

## 2023-09-05 ENCOUNTER — CARE COORDINATION (OUTPATIENT)
Dept: CASE MANAGEMENT | Age: 58
End: 2023-09-05

## 2023-09-05 ENCOUNTER — TELEPHONE (OUTPATIENT)
Dept: FAMILY MEDICINE CLINIC | Age: 58
End: 2023-09-05

## 2023-09-05 DIAGNOSIS — I16.0 HYPERTENSIVE URGENCY: Primary | ICD-10-CM

## 2023-09-05 DIAGNOSIS — R07.89 RIGHT-SIDED CHEST WALL PAIN: ICD-10-CM

## 2023-09-05 DIAGNOSIS — F10.930 ALCOHOL WITHDRAWAL SYNDROME WITHOUT COMPLICATION (HCC): ICD-10-CM

## 2023-09-05 DIAGNOSIS — I48.19 ATRIAL FIBRILLATION, PERSISTENT (HCC): ICD-10-CM

## 2023-09-05 NOTE — CARE COORDINATION
not have any further questions or concerns at this time. Were discharge instructions available to patient? Yes. Reviewed appropriate site of care based on symptoms and resources available to patient including: PCP  Specialist  Urgent care clinics  When to call Tita Pollard. The patient agrees to contact the PCP office for questions related to their healthcare. Advance Care Planning:   Does patient have an Advance Directive: reviewed and current. Medication reconciliation was performed with patient, who verbalizes understanding of administration of home medications. Medications reviewed, 1111F entered: yes    Was patient discharged with a pulse oximeter? no    Non-face-to-face services provided:  Scheduled appointment with PCP-9/7  Scheduled appointment with Specialist-12/27  Obtained and reviewed discharge summary and/or continuity of care documents    Offered patient enrollment in the Remote Patient Monitoring (RPM) program for in-home monitoring: Patient is not eligible for RPM program.    Care Transitions 24 Hour Call    Schedule Follow Up Appointment with PCP: Completed  Do you have a copy of your discharge instructions?: Yes  Do you have all of your prescriptions and are they filled?: Yes  Have you been contacted by a [facility] Pharmacist?: No  Have you scheduled your follow up appointment?: Yes  How are you going to get to your appointment?: Car - drive self  Do you feel like you have everything you need to keep you well at home?: Yes  Care Transitions Interventions     Transportation Support: Declined            Discussed follow-up appointments. If no appointment was previously scheduled, appointment scheduling offered: Yes. Is follow up appointment scheduled within 7 days of discharge? Yes.     Follow Up  Future Appointments   Date Time Provider [address]   9/7/2023  9:00 AM ISABELLA Abreu - [address]   11/22/2023  3:30 PM ISABELLA Abreu - [address] [address]

## 2023-09-05 NOTE — TELEPHONE ENCOUNTER
Patient  called back regarding [employer] forms that were faxed to our office on 8/29 and  8/31. They have not received completed forms. He is going to have them fax new forms for us to complete regarding recent hospitalization. He is requesting we fill them out and fax them back ASAP as they are time sensitive. He also states both Dr Conway Primrose and Richa Ramey must sign the forms.

## 2023-09-05 NOTE — TELEPHONE ENCOUNTER
Care Transitions Initial Follow Up Call    Outreach made within 2 business days of discharge: Yes    Patient: Melissa Arellano Patient : 1965   MRN: [id number]  Reason for Admission: There are no discharge diagnoses documented for the most recent discharge. Discharge Date: 23       Spoke with: Patient    Discharge department/facility: [facility]    TCM Interactive Patient Contact:  Was patient able to fill all prescriptions: N/A  Was patient instructed to bring all medications to the follow-up visit: Yes  Is patient taking all medications as directed in the discharge summary? Yes  Does patient understand their discharge instructions: Yes  Does patient have questions or concerns that need addressed prior to 7-14 day follow up office visit: yes - having problems with [facility].   He will call them and follow-up regarding paperwork    Scheduled appointment with PCP within 7-14 days    Follow Up  Future Appointments   Date Time Provider [address] [place] [address]   2023  9:00 AM Bettye Burns [address] [place] [address]   2023  3:30 PM Bettye Burns [address] [place] [address]   2023  1:45 PM SCHEDULE, SRPX PACER NURSE N SRPX PACER [facility]   2023  1:45 PM Pablo Robbins MD N [address] [facility],  [address]   2024  1:00 PM Moise Melo, TERRENCEN

## 2023-09-07 ENCOUNTER — OFFICE VISIT (OUTPATIENT)
Dept: FAMILY MEDICINE CLINIC | Age: 58
End: 2023-09-07

## 2023-09-07 VITALS
HEART RATE: 68 BPM | WEIGHT: 261.6 LBS | BODY MASS INDEX: 33.59 KG/M2 | SYSTOLIC BLOOD PRESSURE: 138 MMHG | RESPIRATION RATE: 16 BRPM | DIASTOLIC BLOOD PRESSURE: 60 MMHG

## 2023-09-07 DIAGNOSIS — I10 ESSENTIAL HYPERTENSION: ICD-10-CM

## 2023-09-07 DIAGNOSIS — M48.062 SPINAL STENOSIS OF LUMBAR REGION WITH NEUROGENIC CLAUDICATION: ICD-10-CM

## 2023-09-07 DIAGNOSIS — N18.4 CKD (CHRONIC KIDNEY DISEASE) STAGE 4, GFR 15-29 ML/MIN (HCC): ICD-10-CM

## 2023-09-07 DIAGNOSIS — I48.21 PERMANENT ATRIAL FIBRILLATION (HCC): ICD-10-CM

## 2023-09-07 DIAGNOSIS — I50.22 CHRONIC SYSTOLIC CONGESTIVE HEART FAILURE (HCC): ICD-10-CM

## 2023-09-07 DIAGNOSIS — I16.0 HYPERTENSIVE URGENCY: ICD-10-CM

## 2023-09-07 DIAGNOSIS — I25.709 CORONARY ARTERY DISEASE INVOLVING CORONARY BYPASS GRAFT OF NATIVE HEART WITH ANGINA PECTORIS (HCC): ICD-10-CM

## 2023-09-07 DIAGNOSIS — F10.10 ALCOHOL ABUSE: ICD-10-CM

## 2023-09-07 DIAGNOSIS — R29.898 LEG WEAKNESS, BILATERAL: ICD-10-CM

## 2023-09-07 DIAGNOSIS — Z09 HOSPITAL DISCHARGE FOLLOW-UP: Primary | ICD-10-CM

## 2023-09-07 DIAGNOSIS — E11.22 CONTROLLED TYPE 2 DIABETES MELLITUS WITH CHRONIC KIDNEY DISEASE, WITHOUT LONG-TERM CURRENT USE OF INSULIN, UNSPECIFIED CKD STAGE (HCC): ICD-10-CM

## 2023-09-07 RX ORDER — DOXAZOSIN 2 MG/1
2 TABLET ORAL DAILY
Qty: 90 TABLET | Refills: 3 | Status: SHIPPED | OUTPATIENT
Start: 2023-09-07

## 2023-09-07 RX ORDER — POTASSIUM CHLORIDE 20 MEQ/1
20 TABLET, EXTENDED RELEASE ORAL DAILY
Qty: 90 TABLET | Refills: 3 | Status: SHIPPED | OUTPATIENT
Start: 2023-09-07

## 2023-09-07 ASSESSMENT — ENCOUNTER SYMPTOMS
SHORTNESS OF BREATH: 0
BACK PAIN: 1
COUGH: 0
ABDOMINAL PAIN: 0
NAUSEA: 0

## 2023-09-07 NOTE — PROGRESS NOTES
Post-Discharge Transitional Care Follow Up      Eliceo Castillo   YOB: 1965    Date of Office Visit:  9/7/2023  Date of Hospital Admission: 8/29/23  Date of Hospital Discharge: 9/2/23  Readmission Risk Score (high >=14%. Medium >=10%):Readmission Risk Score: 27.9      Care management risk score Rising risk (score 2-5) and Complex Care (Scores >=6): No Risk Score On File     Non face to face  following discharge, date last encounter closed (first attempt may have been earlier): 09/05/2023     Call initiated 2 business days of discharge: Yes     Hospital discharge follow-up  -     AK DISCHARGE MEDS RECONCILED W/ CURRENT OUTPATIENT MED LIST  Controlled type 2 diabetes mellitus with chronic kidney disease, without long-term current use of insulin, unspecified CKD stage ([address])  -     linagliptin (TRADJENTA) 5 MG tablet; TAKE 1 TABLET BY MOUTH DAILY, Disp-90 tablet, R-3Normal  Hypertensive urgency  Permanent atrial fibrillation (HCC)  Chronic systolic congestive heart failure (HCC)  Coronary artery disease involving coronary bypass graft of native heart with angina pectoris (HCC)  CKD (chronic kidney disease) stage 4, GFR 15-29 ml/min (HCC)  Spinal stenosis of lumbar region with neurogenic claudication  Leg weakness, bilateral  Essential hypertension  Alcohol abuse      MDM:  Strength improving. Denies alcohol since hospitalization. Discussed importance of alcohol cessation. Compliance stressed with medication    Completed lovenox bridge          - seen Coumadin Clinic yesterday INR wnl    Information given for PAIN referral to contact  Chronic conditions stable  Leg swelling improved. RTW 9/11/23    Medical Decision Making: high complexity  Return if symptoms worsen or fail to improve. Subjective:   HPI    Inpatient course: Discharge summary reviewed- see chart.     Admit date: 8/29/2023  Discharge date: 9/2/23   Attending provider: Gavi Londono MD        Primary care provider: Jaja Fuchs

## 2023-09-12 ENCOUNTER — CARE COORDINATION (OUTPATIENT)
Dept: CASE MANAGEMENT | Age: 58
End: 2023-09-12

## 2023-09-12 NOTE — CARE COORDINATION
care clinics  When to call Tita Pollard. The patient agrees to contact the PCP office for questions related to their healthcare. Advance Care Planning:   reviewed and current. Patients top risk factors for readmission: HTN urgency afib, alcohol intox, falls, CAD, CKDIV, CHF, DM,pacer  Interventions to address risk factors: Scheduled appointment with PCP-11/22/23 and Scheduled appointment with [facility] 12/27/23    Offered patient enrollment in the Remote Patient Monitoring (RPM) program for in-home monitoring: Patient declined. Care Transitions Subsequent and Final Call    Schedule Follow Up Appointment with PCP: Completed  Subsequent and Final Calls  Do you have any ongoing symptoms?: Yes  Onset of Patient-reported symptoms: Other  Patient-reported symptoms: Pain  Interventions for patient-reported symptoms: Notified PCP/Physician  Have your medications changed?: No  Do you have any questions related to your medications?: No  Do you currently have any active services?: No  Do you have any needs or concerns that I can assist you with?: No  Identified Barriers: Lack of Education, Other  Care Transitions Interventions     Transportation Support: Declined                           Other Interventions:             Care Transition Nurse provided contact information for future needs. Plan for follow-up call in 7-10 days based on severity of symptoms and risk factors. Plan for next call: symptom management-palpitations, chest pain, sob, falls, dizziness  self management-BLOOD SUGAR, wt.   medication management-Coumadin/INR compliance?     Rae Mckinney RN Clear

## 2023-09-18 DIAGNOSIS — I10 ESSENTIAL HYPERTENSION: ICD-10-CM

## 2023-09-18 RX ORDER — HYDRALAZINE HYDROCHLORIDE 50 MG/1
50 TABLET, FILM COATED ORAL 3 TIMES DAILY
Qty: 90 TABLET | Refills: 1 | Status: SHIPPED | OUTPATIENT
Start: 2023-09-18 | End: 2023-11-17

## 2023-09-19 ENCOUNTER — CARE COORDINATION (OUTPATIENT)
Dept: CASE MANAGEMENT | Age: 58
End: 2023-09-19

## 2023-09-19 NOTE — CARE COORDINATION
[facility] Care Transitions Follow Up Call    Patient Current Location:  Home: [address] [id number]    Care Transition Nurse contacted the patient by telephone to follow up after admission on 23. Verified name and  with patient as identifiers. Patient: Mariya Hill  Patient : 1965   MRN: [id number]  Reason for Admission: HTN urgency afib, alcohol intox, falls  Discharge Date: 23 RARS: Readmission Risk Score: 27.9      Needs to be reviewed by the provider   Additional needs identified to be addressed with provider: No  none             Method of communication with provider: none. CTN call to Trista Cruz today and he is at work. Says he feels ok but c/o having the chills, fatigue, back pain (not new) over the last week. Denies fever, sob, leahy, chest/abd pain, n/v/d, swelling, dizziness, hematuria, dysuria, polyuria, malaise, bleeding. BLOOD SUGAR=130  wt.= not taking  INR 23 LMH=2.70 taking Coumadin as directed. Next INR 23 @ [facility]. Says is not drinking alcohol. Eating, drinking, sleeping ok. This writer recommends increasing water intake. He expresses understanding and will try to drink more than 2 bottled water/day. Recommend calling PCP re: chills, back pain (?UTI sxs). He will consider and call if he thinks necessary. Pt. expresses that he is on too many meds. This writer advised against stopping any meds without Dr. Frank Arnold. Offered to call & move Cardio appt. Sooner. He politely declined. He will consider this and call if he thinks necessary. He abruptly ended call. Will continue to follow. Addressed changes since last contact:  labs-INR 23  Discussed follow-up appointments. If no appointment was previously scheduled, appointment scheduling offered: Yes. Is follow up appointment scheduled within 7 days of discharge? Yes.     Follow Up  Future Appointments   Date Time Provider [address]   2023  3:30 PM Christina Naylor, APRN  [address] [address]

## 2023-09-22 ENCOUNTER — COMMUNITY OUTREACH (OUTPATIENT)
Dept: FAMILY MEDICINE CLINIC | Age: 58
End: 2023-09-22

## 2023-09-26 ENCOUNTER — CARE COORDINATION (OUTPATIENT)
Dept: CASE MANAGEMENT | Age: 58
End: 2023-09-26

## 2023-09-26 PROCEDURE — 93295 DEV INTERROG REMOTE 1/2/MLT: CPT | Performed by: NUCLEAR MEDICINE

## 2023-09-26 PROCEDURE — 93296 REM INTERROG EVL PM/IDS: CPT | Performed by: NUCLEAR MEDICINE

## 2023-09-26 NOTE — CARE COORDINATION
[address] [facility] 250 Care Transitions Follow Up Call:Final Call    Patient Current Location:  Home: [address] [facility] [address] [facility]  [address] [facility] [phone]    Care Transition Nurse contacted the patient by telephone to follow up after admission on 23. Verified name and  with patient as identifiers. Patient: Vera Olivas  Patient : 1965   MRN: [id number]  Reason for Admission: HTN urgency afib, alcohol intox, falls  Discharge Date: 23 RARS: Readmission Risk Score: 27.9      Needs to be reviewed by the provider   Additional needs identified to be addressed with provider: No  none             Method of communication with provider: none. CTN call to Kieran Bautista today and he is at work. Denies chest pain, falls, sob, UTI sxs, fever, chills, n/v/d, leahy, bleeding, malaise, palpitations, dizziness. Says he has a little LE swelling. BLOOD SUGAR=130 BP= no wt. =no  INR @ [facility] today. Taking Coumadin as directed. Did not call Cardio about seeing sooner. He changed his mind he says because he is feeling pretty good now. Denies drinking alcohol. Eating, drinking & sleeping ok. Denies problems w/ urination/bowels. No other concerns voiced at this time. CTN informed him of final call. He expressed appreciation for the care. Addressed changes since last contact:  none  Discussed follow-up appointments. If no appointment was previously scheduled, appointment scheduling offered: Yes. Is follow up appointment scheduled within 7 days of discharge? Yes.     Follow Up  Future Appointments   Date Time Provider [address] [place] [address]   2023  3:30 PM Yazan Branch, APRN - [facility]   2023  1:45 PM SCHEDULE, SRPX PACER Tate De Anda SRPX PACER [facility] - [place]   2023  1:45 PM Cecilio Gaspar MD N [address] [facility], [address]   2024  1:00 PM Millicent [facility]avez [facility] N [facility]     Non-[facility] follow up appointment(s):     Care Transition Nurse reviewed discharge instructions, medical action plan, and red flags with patient

## 2023-09-27 ENCOUNTER — PROCEDURE VISIT (OUTPATIENT)
Dept: CARDIOLOGY CLINIC | Age: 58
End: 2023-09-27
Payer: COMMERCIAL

## 2023-09-27 DIAGNOSIS — Z95.810 S/P ICD (INTERNAL CARDIAC DEFIBRILLATOR) PROCEDURE: Primary | ICD-10-CM

## 2023-09-27 NOTE — PROGRESS NOTES
[employer] Medtronic Dual ICD   Pt of Baki    Battery 13 months      Presenting rhythm ASVS    A Impedance 399  RV Impedance 323    RV shock 58  SVC shock -    P wave sensing 1.1  R wave sensing 6.8    A Threshold 0.625 @ 0.40  A Amplitude 1.50 @ 0.40  RV Thresholds 0.75 @ 0.40  RV Amplitude 1.50 @ 0.40    A Paced 7.7%  V Paced <0.1%    Programmed Mode AAI <=> DDD 50    Afib Hymera 0%    Episodes:   8/26/23 VT with ATP  8/28/23- 3 runs VT   8/29/23- 12 runs VT  -- was admitted to ER for rib pain/alcohol detox.  Was not taking his medications     Optivol WNL

## 2023-10-02 NOTE — CARE COORDINATION
Case Management Assessment  Initial Evaluation    Date/Time of Evaluation: 11/23/2022 9:17 AM  Assessment Completed by: Joey uGdino RN    If patient is discharged prior to next notation, then this note serves as note for discharge by case management. Patient Name: Tessa Cox                   YOB: 1965  Diagnosis: Anxiety [F41.9]  Chest pain [R07.9]  Elevated troponin [R77.8]  History of prior ablation treatment [Z98.890]  Chest pain, unspecified type [R07.9]                   Date / Time: 11/22/2022 11:55 PM    Patient Admission Status: Inpatient     Current PCP: ISABELLA Lund CNP  PCP verified by CM? Yes    Chart Reviewed: Yes      Patient Orientation: Alert and Oriented    Patient Cognition: Alert  History Provided by: Patient    Hospitalization in the last 30 days (Readmission):  No    If yes, Readmission Assessment in CM Navigator will be completed. Advance Directives:     Code Status: Full Code     Primary Decision Maker: Clay Kaminski [employer]) - Child - [phone]    Discharge Planning  Patient lives with:   Type of Home:     Primary Caregiver: Self  Patient Support Systems include: Children   Current Financial resources: Other (Comment) (commercial insurance)  Current community resources: Other (Comment) (n/a)  Current services prior to admission:     Type of Home Care services:       ADLS  Prior functional level: Independent in ADLs/IADLs  Current functional level: Independent in ADLs/IADLs      Family can provide assistance at DC: Yes  Would you like Case Management to discuss the discharge plan with any other family members/significant others, and if so, who?  No  Plans to Return to Present Housing: Yes  Other Identified Issues/Barriers to RETURNING to current housing: n/a  Potential Assistance needed at discharge:    Patient expects to discharge to:    Plan for transportation at discharge:      Financial  Payor: Trevor Osler / Plan: [address] [place] [address] PPO / Product Type: *No Product Message -----  From: Mela Cano  Sent: 9/29/2023   2:46 PM CDT  To: *  Subject: Dupixent 300 mg                                   Hi,   Please provide a letter of medical necessity for this medication so we can send to the labor fund office for review, they will also need a prescription faxed to them at [phone] fax     Thanks   Mela      type* /     Does insurance require precert for SNF: Yes    Potential assistance Purchasing Medications:    Meds-to-Beds request:        [id number] [place], [address]  Phone: [phone] Fax: [phone]      Factors facilitating achievement of predicted outcomes: Family support, Motivated, Cooperative, and Pleasant    Barriers to discharge: n/a    Additional Case Management Notes: Hospitalist following. Cardio consult. Troponin elevated, likely r/t ablation. Amiodarone. Cardura. Apresoline. Metoprolol. Brilinta. NTG gtt. Procedure:   11/22 Cardiac ablation, returned to ED r/t chest pain  11/23 CXR: Impression: Minimal right basilar atelectasis otherwise stable findings in the comparison exam.     The Plan for Transition of Care is related to the following treatment goals of Anxiety [F41.9]  Chest pain [R07.9]  Elevated troponin [R77.8]  History of prior ablation treatment [Z98.890]  Chest pain, unspecified type [R07.9]    Patient Goals/Plan/Treatment Preferences: Spoke with patient, plans to return home independently. He denies needs. Transportation/Food Security/Housekeeping Addressed: No issues identified.      Adela Mayorga RN  Case Management Department

## 2023-10-25 DIAGNOSIS — F41.3 OTHER MIXED ANXIETY DISORDERS: ICD-10-CM

## 2023-10-25 RX ORDER — ALPRAZOLAM 0.5 MG/1
TABLET ORAL
Qty: 30 TABLET | Refills: 5 | Status: SHIPPED | OUTPATIENT
Start: 2023-10-25 | End: 2024-04-22

## 2023-10-25 NOTE — TELEPHONE ENCOUNTER
Patient requesting refill of Xanax to [facility]. Please refill if appropriate.   Will check with pharmacy after 4pm

## 2023-11-06 ENCOUNTER — APPOINTMENT (OUTPATIENT)
Dept: INTERVENTIONAL RADIOLOGY/VASCULAR | Age: 58
End: 2023-11-06
Payer: COMMERCIAL

## 2023-11-06 ENCOUNTER — HOSPITAL ENCOUNTER (EMERGENCY)
Age: 58
Discharge: HOME | End: 2023-11-06
Attending: STUDENT IN AN ORGANIZED HEALTH CARE EDUCATION/TRAINING PROGRAM
Payer: COMMERCIAL

## 2023-11-06 ENCOUNTER — APPOINTMENT (OUTPATIENT)
Dept: GENERAL RADIOLOGY | Age: 58
End: 2023-11-06
Payer: COMMERCIAL

## 2023-11-06 VITALS
BODY MASS INDEX: 35.31 KG/M2 | TEMPERATURE: 98.3 F | HEART RATE: 66 BPM | DIASTOLIC BLOOD PRESSURE: 73 MMHG | SYSTOLIC BLOOD PRESSURE: 144 MMHG | RESPIRATION RATE: 13 BRPM | WEIGHT: 275 LBS | OXYGEN SATURATION: 97 %

## 2023-11-06 DIAGNOSIS — I83.91 VARICOSE VEINS OF RIGHT LOWER EXTREMITY, UNSPECIFIED WHETHER COMPLICATED: ICD-10-CM

## 2023-11-06 DIAGNOSIS — D68.9 COAGULOPATHY (HCC): ICD-10-CM

## 2023-11-06 DIAGNOSIS — R79.1 DEVIATION OF INTERNATIONAL NORMALIZED RATIO (INR) FROM TARGET RANGE: Primary | ICD-10-CM

## 2023-11-06 DIAGNOSIS — R60.0 LEG EDEMA: ICD-10-CM

## 2023-11-06 LAB
ANION GAP SERPL CALC-SCNC: 14 MEQ/L (ref 8–16)
APTT PPP: 35.1 SECONDS (ref 22–38)
BASOPHILS ABSOLUTE: 0.1 THOU/MM3 (ref 0–0.1)
BASOPHILS NFR BLD AUTO: 1 %
BUN SERPL-MCNC: 34 MG/DL (ref 7–22)
CALCIUM SERPL-MCNC: 8.4 MG/DL (ref 8.5–10.5)
CHLORIDE SERPL-SCNC: 103 MEQ/L (ref 98–111)
CO2 SERPL-SCNC: 18 MEQ/L (ref 23–33)
CREAT SERPL-MCNC: 3 MG/DL (ref 0.4–1.2)
DEPRECATED RDW RBC AUTO: 54.6 FL (ref 35–45)
EKG ATRIAL RATE: 70 BPM
EKG P AXIS: 94 DEGREES
EKG P-R INTERVAL: 218 MS
EKG Q-T INTERVAL: 404 MS
EKG QRS DURATION: 90 MS
EKG QTC CALCULATION (BAZETT): 436 MS
EKG R AXIS: 9 DEGREES
EKG T AXIS: 118 DEGREES
EKG VENTRICULAR RATE: 70 BPM
EOSINOPHIL NFR BLD AUTO: 4.2 %
EOSINOPHILS ABSOLUTE: 0.4 THOU/MM3 (ref 0–0.4)
ERYTHROCYTE [DISTWIDTH] IN BLOOD BY AUTOMATED COUNT: 15.2 % (ref 11.5–14.5)
GFR SERPL CREATININE-BSD FRML MDRD: 23 ML/MIN/1.73M2
GLUCOSE SERPL-MCNC: 205 MG/DL (ref 70–108)
HCT VFR BLD AUTO: 33 % (ref 42–52)
HGB BLD-MCNC: 10.4 GM/DL (ref 14–18)
IMM GRANULOCYTES # BLD AUTO: 0.04 THOU/MM3 (ref 0–0.07)
IMM GRANULOCYTES NFR BLD AUTO: 0.5 %
INR PPP: 1.26 (ref 0.85–1.13)
LIPASE SERPL-CCNC: 64.4 U/L (ref 5.6–51.3)
LYMPHOCYTES ABSOLUTE: 0.7 THOU/MM3 (ref 1–4.8)
LYMPHOCYTES NFR BLD AUTO: 7.8 %
MCH RBC QN AUTO: 31.3 PG (ref 26–33)
MCHC RBC AUTO-ENTMCNC: 31.5 GM/DL (ref 32.2–35.5)
MCV RBC AUTO: 99.4 FL (ref 80–94)
MONOCYTES ABSOLUTE: 0.7 THOU/MM3 (ref 0.4–1.3)
MONOCYTES NFR BLD AUTO: 8.6 %
NEUTROPHILS NFR BLD AUTO: 77.9 %
NRBC BLD AUTO-RTO: 0 /100 WBC
NT-PROBNP SERPL IA-MCNC: ABNORMAL PG/ML (ref 0–124)
OSMOLALITY SERPL CALC.SUM OF ELEC: 283.6 MOSMOL/KG (ref 275–300)
PLATELET # BLD AUTO: 205 THOU/MM3 (ref 130–400)
PMV BLD AUTO: 10.2 FL (ref 9.4–12.4)
POTASSIUM SERPL-SCNC: 4.5 MEQ/L (ref 3.5–5.2)
RBC # BLD AUTO: 3.32 MILL/MM3 (ref 4.7–6.1)
SEGMENTED NEUTROPHILS ABSOLUTE COUNT: 6.8 THOU/MM3 (ref 1.8–7.7)
SODIUM SERPL-SCNC: 135 MEQ/L (ref 135–145)
TROPONIN, HIGH SENSITIVITY: 132 NG/L (ref 0–12)
WBC # BLD AUTO: 8.7 THOU/MM3 (ref 4.8–10.8)

## 2023-11-06 PROCEDURE — 93005 ELECTROCARDIOGRAM TRACING: CPT | Performed by: STUDENT IN AN ORGANIZED HEALTH CARE EDUCATION/TRAINING PROGRAM

## 2023-11-06 PROCEDURE — 80048 BASIC METABOLIC PNL TOTAL CA: CPT

## 2023-11-06 PROCEDURE — 99285 EMERGENCY DEPT VISIT HI MDM: CPT

## 2023-11-06 PROCEDURE — 85730 THROMBOPLASTIN TIME PARTIAL: CPT

## 2023-11-06 PROCEDURE — 83690 ASSAY OF LIPASE: CPT

## 2023-11-06 PROCEDURE — 93971 EXTREMITY STUDY: CPT

## 2023-11-06 PROCEDURE — 83880 ASSAY OF NATRIURETIC PEPTIDE: CPT

## 2023-11-06 PROCEDURE — 85025 COMPLETE CBC W/AUTO DIFF WBC: CPT

## 2023-11-06 PROCEDURE — 36415 COLL VENOUS BLD VENIPUNCTURE: CPT

## 2023-11-06 PROCEDURE — 93010 ELECTROCARDIOGRAM REPORT: CPT | Performed by: INTERNAL MEDICINE

## 2023-11-06 PROCEDURE — 71046 X-RAY EXAM CHEST 2 VIEWS: CPT

## 2023-11-06 PROCEDURE — 84484 ASSAY OF TROPONIN QUANT: CPT

## 2023-11-06 PROCEDURE — 85610 PROTHROMBIN TIME: CPT

## 2023-11-06 ASSESSMENT — PAIN DESCRIPTION - ORIENTATION: ORIENTATION: RIGHT

## 2023-11-06 ASSESSMENT — ENCOUNTER SYMPTOMS
COLOR CHANGE: 0
SHORTNESS OF BREATH: 0
ABDOMINAL DISTENTION: 0
DIARRHEA: 0
ABDOMINAL PAIN: 0
COUGH: 0
NAUSEA: 0
BACK PAIN: 0

## 2023-11-06 ASSESSMENT — PAIN DESCRIPTION - LOCATION: LOCATION: LEG

## 2023-11-06 ASSESSMENT — PAIN SCALES - GENERAL: PAINLEVEL_OUTOF10: 5

## 2023-11-06 NOTE — DISCHARGE INSTRUCTIONS
We have seen your for right leg swelling. We are not concern for infection and ultrasound of the leg did not show blood clots  We highly recommend compliance with all your medications. Please follow up with your PCP for a medication reconciliation. Continue using your bumex daily. Use correct size compression stockings.

## 2023-11-06 NOTE — ED PROVIDER NOTES
[facility] ENCOUNTER          Pt Name: Anatoly Mayen  MRN: [id number]  [address] [facility] 1965  Date of evaluation: 11/6/2023  Treating Resident Physician: Hawa Vázquez MD  Supervising Physician: Dr. Deepali Kaplan    History obtained from chart review and the patient. CHIEF COMPLAINT       Chief Complaint   Patient presents with    Leg Swelling    Leg Pain           HISTORY OF PRESENT ILLNESS    HPI  Anatoly Mayen is a 62 y.o. male w/ PMH CHF, CAD, EtOH Abuse, Afib on [address], HTN, GERD, CKD4 not on HD who presents to the emergency department for evaluation of right sided leg swelling w/ concern of infection 2/2 tight diabetic socks x 2-3 weeks. Pt states is concern for infection 2/2 cut sustain 2/2 compression of socks on posterior aspect of proximal right calf. Wound appears well healed, no erythema or pain. Pt is not well compliant with home bumex, following coumadin clinic for INR. Pt states leg swelling has improved with supine positioning. The patient has no other acute complaints at this time. REVIEW OF SYSTEMS   Review of Systems   Constitutional:  Negative for activity change, appetite change, chills, diaphoresis, fatigue and fever. Respiratory:  Negative for cough and shortness of breath. Cardiovascular:  Positive for leg swelling. Negative for chest pain and palpitations. Gastrointestinal:  Negative for abdominal distention, abdominal pain, diarrhea and nausea. Genitourinary:  Negative for decreased urine volume, difficulty urinating, dysuria, frequency, hematuria and urgency. Musculoskeletal:  Negative for arthralgias, back pain, gait problem, joint swelling and myalgias. Skin:  Positive for wound. Negative for color change, pallor and rash. Neurological:  Negative for dizziness, tremors, seizures, weakness, light-headedness and headaches.          PAST MEDICAL AND SURGICAL HISTORY     Past Medical History:   Diagnosis Date

## 2023-11-07 ENCOUNTER — CARE COORDINATION (OUTPATIENT)
Dept: CARE COORDINATION | Age: 58
End: 2023-11-07

## 2023-11-07 ENCOUNTER — TELEPHONE (OUTPATIENT)
Dept: FAMILY MEDICINE CLINIC | Age: 58
End: 2023-11-07

## 2023-11-07 NOTE — CARE COORDINATION
Pt answered but reports that he is currently at work and unable to talk. Pt states late afternoon is better time to reach him. Will attempt to reach pt later today.

## 2023-11-07 NOTE — CARE COORDINATION
Attempted to reach [facility] as requested. No answer. Message left with contact info to return call.

## 2023-11-08 ENCOUNTER — PROCEDURE VISIT (OUTPATIENT)
Dept: CARDIOLOGY CLINIC | Age: 58
End: 2023-11-08
Payer: COMMERCIAL

## 2023-11-08 ENCOUNTER — TELEPHONE (OUTPATIENT)
Dept: CARDIOLOGY CLINIC | Age: 58
End: 2023-11-08

## 2023-11-08 DIAGNOSIS — I42.0 DILATED CARDIOMYOPATHY (HCC): Primary | ICD-10-CM

## 2023-11-08 PROCEDURE — G2066 INTER DEVC REMOTE 30D: HCPCS | Performed by: NUCLEAR MEDICINE

## 2023-11-08 PROCEDURE — 93297 REM INTERROG DEV EVAL ICPMS: CPT | Performed by: NUCLEAR MEDICINE

## 2023-11-08 NOTE — PROGRESS NOTES
[facility] medtronic optivol     9 months on device   Optivol mildly elevated     Will address in separate encounter

## 2023-11-15 ENCOUNTER — CARE COORDINATION (OUTPATIENT)
Dept: CARE COORDINATION | Age: 58
End: 2023-11-15

## 2023-11-15 NOTE — CARE COORDINATION
Pt answered. States that he is at work. Offered to call pt at a different time or day of the week. Pt states \"I'm ok. \"  Pt reports that he prefers no further outreach from care coordination.

## 2023-11-16 NOTE — ED NOTES
Patient medicated per MAR at this time. Patient reports nausea and anxiety at this time, and is comfortably resting in bed with family at bedside. Patient updated on plan of care at this time.       Amanda Bunch RN  08/07/23 105 Toileting/Moving from bed to chair

## 2023-11-29 ENCOUNTER — HOSPITAL ENCOUNTER (INPATIENT)
Age: 58
LOS: 2 days | Discharge: HOME | End: 2023-12-01
Attending: EMERGENCY MEDICINE
Payer: COMMERCIAL

## 2023-11-29 ENCOUNTER — APPOINTMENT (OUTPATIENT)
Dept: CT IMAGING | Age: 58
End: 2023-11-29
Payer: COMMERCIAL

## 2023-11-29 ENCOUNTER — APPOINTMENT (OUTPATIENT)
Dept: ULTRASOUND IMAGING | Age: 58
End: 2023-11-29
Payer: COMMERCIAL

## 2023-11-29 ENCOUNTER — APPOINTMENT (OUTPATIENT)
Age: 58
End: 2023-11-29
Payer: COMMERCIAL

## 2023-11-29 DIAGNOSIS — I47.20 V TACH (HCC): Primary | ICD-10-CM

## 2023-11-29 DIAGNOSIS — I48.21 PERMANENT ATRIAL FIBRILLATION (HCC): ICD-10-CM

## 2023-11-29 DIAGNOSIS — I42.0 DILATED CARDIOMYOPATHY (HCC): ICD-10-CM

## 2023-11-29 DIAGNOSIS — F10.10 ALCOHOL ABUSE: ICD-10-CM

## 2023-11-29 LAB
ALBUMIN SERPL BCG-MCNC: 3.6 G/DL (ref 3.5–5.1)
ALP SERPL-CCNC: 112 U/L (ref 38–126)
ALT SERPL W/O P-5'-P-CCNC: 30 U/L (ref 11–66)
ANION GAP SERPL CALC-SCNC: 19 MEQ/L (ref 8–16)
APTT PPP: 37.2 SECONDS (ref 22–38)
AST SERPL-CCNC: 43 U/L (ref 5–40)
B-OH-BUTYR SERPL-MSCNC: 3.3 MG/DL (ref 0.2–2.81)
BACTERIA URNS QL MICRO: ABNORMAL /HPF
BASOPHILS ABSOLUTE: 0.1 THOU/MM3 (ref 0–0.1)
BASOPHILS NFR BLD AUTO: 1.6 %
BILIRUB SERPL-MCNC: 0.3 MG/DL (ref 0.3–1.2)
BILIRUB UR QL STRIP.AUTO: NEGATIVE
BUN SERPL-MCNC: 39 MG/DL (ref 7–22)
CALCIUM SERPL-MCNC: 8.6 MG/DL (ref 8.5–10.5)
CASTS #/AREA URNS LPF: ABNORMAL /LPF
CASTS 2: ABNORMAL /LPF
CHARACTER UR: CLEAR
CHLORIDE SERPL-SCNC: 106 MEQ/L (ref 98–111)
CO2 SERPL-SCNC: 15 MEQ/L (ref 23–33)
COLOR: YELLOW
CREAT SERPL-MCNC: 3.1 MG/DL (ref 0.4–1.2)
CRYSTALS URNS MICRO: ABNORMAL
DEPRECATED RDW RBC AUTO: 55.4 FL (ref 35–45)
ECHO AO ASC DIAM: 3.7 CM
ECHO AO ASCENDING AORTA INDEX: 1.46 CM/M2
ECHO AV CUSP MM: 2.1 CM
ECHO AV PEAK GRADIENT: 16 MMHG
ECHO AV PEAK VELOCITY: 2 M/S
ECHO AV VELOCITY RATIO: 0.6
ECHO BSA: 2.6 M2
ECHO LA AREA 2C: 29.5 CM2
ECHO LA AREA 4C: 27.9 CM2
ECHO LA DIAMETER INDEX: 2.02 CM/M2
ECHO LA DIAMETER: 5.1 CM
ECHO LA MAJOR AXIS: 6.4 CM
ECHO LA MINOR AXIS: 6.6 CM
ECHO LA VOL BP: 105 ML (ref 18–58)
ECHO LA VOL MOD A2C: 108 ML (ref 18–58)
ECHO LA VOL MOD A4C: 99 ML (ref 18–58)
ECHO LA VOL/BSA BIPLANE: 42 ML/M2 (ref 16–34)
ECHO LA VOLUME INDEX MOD A2C: 43 ML/M2 (ref 16–34)
ECHO LA VOLUME INDEX MOD A4C: 39 ML/M2 (ref 16–34)
ECHO LV FRACTIONAL SHORTENING: 17 % (ref 28–44)
ECHO LV INTERNAL DIMENSION DIASTOLE INDEX: 2.53 CM/M2
ECHO LV INTERNAL DIMENSION DIASTOLIC: 6.4 CM (ref 4.2–5.9)
ECHO LV INTERNAL DIMENSION SYSTOLIC INDEX: 2.09 CM/M2
ECHO LV INTERNAL DIMENSION SYSTOLIC: 5.3 CM
ECHO LV ISOVOLUMETRIC RELAXATION TIME (IVRT): 46 MS
ECHO LV IVSD: 1.1 CM (ref 0.6–1)
ECHO LV MASS 2D: 330.4 G (ref 88–224)
ECHO LV MASS INDEX 2D: 130.6 G/M2 (ref 49–115)
ECHO LV POSTERIOR WALL DIASTOLIC: 1.2 CM (ref 0.6–1)
ECHO LV RELATIVE WALL THICKNESS RATIO: 0.38
ECHO LVOT PEAK GRADIENT: 5 MMHG
ECHO LVOT PEAK VELOCITY: 1.2 M/S
ECHO MV E DECELERATION TIME (DT): 169 MS
ECHO MV E VELOCITY: 1.37 M/S
ECHO MV REGURGITANT PEAK GRADIENT: 96 MMHG
ECHO MV REGURGITANT PEAK VELOCITY: 4.9 M/S
ECHO PULMONARY ARTERY END DIASTOLIC PRESSURE: 6 MMHG
ECHO PV MAX VELOCITY: 1.2 M/S
ECHO PV PEAK GRADIENT: 6 MMHG
ECHO PV REGURGITANT MAX VELOCITY: 1.3 M/S
ECHO RV INTERNAL DIMENSION: 3.3 CM
ECHO TV E WAVE: 0.9 M/S
ECHO TV REGURGITANT MAX VELOCITY: 3.12 M/S
ECHO TV REGURGITANT PEAK GRADIENT: 39 MMHG
EKG ATRIAL RATE: 104 BPM
EKG ATRIAL RATE: 138 BPM
EKG P AXIS: 91 DEGREES
EKG P-R INTERVAL: 192 MS
EKG P-R INTERVAL: 224 MS
EKG Q-T INTERVAL: 342 MS
EKG Q-T INTERVAL: 360 MS
EKG QRS DURATION: 86 MS
EKG QRS DURATION: 92 MS
EKG QTC CALCULATION (BAZETT): 479 MS
EKG QTC CALCULATION (BAZETT): 480 MS
EKG R AXIS: -3 DEGREES
EKG R AXIS: -5 DEGREES
EKG T AXIS: 102 DEGREES
EKG T AXIS: 111 DEGREES
EKG VENTRICULAR RATE: 107 BPM
EKG VENTRICULAR RATE: 118 BPM
EOSINOPHIL NFR BLD AUTO: 3.3 %
EOSINOPHILS ABSOLUTE: 0.2 THOU/MM3 (ref 0–0.4)
EPITHELIAL CELLS, UA: ABNORMAL /HPF
ERYTHROCYTE [DISTWIDTH] IN BLOOD BY AUTOMATED COUNT: 15.1 % (ref 11.5–14.5)
ETHANOL SERPL-MCNC: 0.19 %
FERRITIN SERPL IA-MCNC: 95 NG/ML (ref 22–322)
FOLATE SERPL-MCNC: 4.3 NG/ML (ref 4.8–24.2)
GFR SERPL CREATININE-BSD FRML MDRD: 22 ML/MIN/1.73M2
GLUCOSE BLD STRIP.AUTO-MCNC: 235 MG/DL (ref 70–108)
GLUCOSE BLD STRIP.AUTO-MCNC: 251 MG/DL (ref 70–108)
GLUCOSE BLD STRIP.AUTO-MCNC: 252 MG/DL (ref 70–108)
GLUCOSE SERPL-MCNC: 259 MG/DL (ref 70–108)
GLUCOSE UR QL STRIP.AUTO: 500 MG/DL
HCT VFR BLD AUTO: 34.4 % (ref 42–52)
HEPARIN UNFRACTIONATED: 0.05 U/ML (ref 0.3–0.7)
HEPARIN UNFRACTIONATED: 0.19 U/ML (ref 0.3–0.7)
HGB BLD-MCNC: 10.6 GM/DL (ref 14–18)
HGB UR QL STRIP.AUTO: ABNORMAL
IMM GRANULOCYTES # BLD AUTO: 0.02 THOU/MM3 (ref 0–0.07)
IMM GRANULOCYTES NFR BLD AUTO: 0.3 %
INR PPP: 1.74 (ref 0.85–1.13)
IRON SATN MFR SERPL: 47 % (ref 20–50)
IRON SERPL-MCNC: 145 UG/DL (ref 65–195)
KETONES UR QL STRIP.AUTO: NEGATIVE
LIPASE SERPL-CCNC: 109 U/L (ref 5.6–51.3)
LYMPHOCYTES ABSOLUTE: 0.9 THOU/MM3 (ref 1–4.8)
LYMPHOCYTES NFR BLD AUTO: 12.6 %
MAGNESIUM SERPL-MCNC: 2.9 MG/DL (ref 1.6–2.4)
MCH RBC QN AUTO: 30.8 PG (ref 26–33)
MCHC RBC AUTO-ENTMCNC: 30.8 GM/DL (ref 32.2–35.5)
MCV RBC AUTO: 100 FL (ref 80–94)
MISCELLANEOUS 2: ABNORMAL
MONOCYTES ABSOLUTE: 0.4 THOU/MM3 (ref 0.4–1.3)
MONOCYTES NFR BLD AUTO: 6.2 %
NEUTROPHILS NFR BLD AUTO: 76 %
NITRITE UR QL STRIP: NEGATIVE
NRBC BLD AUTO-RTO: 0 /100 WBC
NT-PROBNP SERPL IA-MCNC: 5057 PG/ML (ref 0–124)
OSMOLALITY SERPL CALC.SUM OF ELEC: 297.7 MOSMOL/KG (ref 275–300)
PH UR STRIP.AUTO: 5.5 [PH] (ref 5–9)
PLATELET # BLD AUTO: 207 THOU/MM3 (ref 130–400)
PMV BLD AUTO: 9.8 FL (ref 9.4–12.4)
POTASSIUM SERPL-SCNC: 4 MEQ/L (ref 3.5–5.2)
PROT SERPL-MCNC: 6.6 G/DL (ref 6.1–8)
PROT UR STRIP.AUTO-MCNC: >= 300 MG/DL
RBC # BLD AUTO: 3.44 MILL/MM3 (ref 4.7–6.1)
RBC URINE: ABNORMAL /HPF
RENAL EPI CELLS #/AREA URNS HPF: ABNORMAL /[HPF]
SEGMENTED NEUTROPHILS ABSOLUTE COUNT: 5.2 THOU/MM3 (ref 1.8–7.7)
SODIUM SERPL-SCNC: 140 MEQ/L (ref 135–145)
SP GR UR REFRACT.AUTO: 1.01 (ref 1–1.03)
T4 FREE SERPL-MCNC: 0.92 NG/DL (ref 0.93–1.76)
TIBC SERPL-MCNC: 306 UG/DL (ref 171–450)
TROPONIN, HIGH SENSITIVITY: 300 NG/L (ref 0–12)
TROPONIN, HIGH SENSITIVITY: 310 NG/L (ref 0–12)
TROPONIN, HIGH SENSITIVITY: 338 NG/L (ref 0–12)
TSH SERPL DL<=0.005 MIU/L-ACNC: 1.65 UIU/ML (ref 0.4–4.2)
UROBILINOGEN, URINE: 0.2 EU/DL (ref 0–1)
VIT B12 SERPL-MCNC: 372 PG/ML (ref 211–911)
WBC # BLD AUTO: 6.8 THOU/MM3 (ref 4.8–10.8)
WBC #/AREA URNS HPF: ABNORMAL /HPF
WBC #/AREA URNS HPF: NEGATIVE /[HPF]
YEAST LIKE FUNGI URNS QL MICRO: ABNORMAL

## 2023-11-29 PROCEDURE — 84466 ASSAY OF TRANSFERRIN: CPT

## 2023-11-29 PROCEDURE — 81001 URINALYSIS AUTO W/SCOPE: CPT

## 2023-11-29 PROCEDURE — 2500000003 HC RX 250 WO HCPCS: Performed by: EMERGENCY MEDICINE

## 2023-11-29 PROCEDURE — 84443 ASSAY THYROID STIM HORMONE: CPT

## 2023-11-29 PROCEDURE — 83690 ASSAY OF LIPASE: CPT

## 2023-11-29 PROCEDURE — 82728 ASSAY OF FERRITIN: CPT

## 2023-11-29 PROCEDURE — C9113 INJ PANTOPRAZOLE SODIUM, VIA: HCPCS | Performed by: EMERGENCY MEDICINE

## 2023-11-29 PROCEDURE — 83880 ASSAY OF NATRIURETIC PEPTIDE: CPT

## 2023-11-29 PROCEDURE — 93005 ELECTROCARDIOGRAM TRACING: CPT | Performed by: STUDENT IN AN ORGANIZED HEALTH CARE EDUCATION/TRAINING PROGRAM

## 2023-11-29 PROCEDURE — 82746 ASSAY OF FOLIC ACID SERUM: CPT

## 2023-11-29 PROCEDURE — 82010 KETONE BODYS QUAN: CPT

## 2023-11-29 PROCEDURE — 93306 TTE W/DOPPLER COMPLETE: CPT

## 2023-11-29 PROCEDURE — 2140000000 HC CCU INTERMEDIATE R&B

## 2023-11-29 PROCEDURE — 83540 ASSAY OF IRON: CPT

## 2023-11-29 PROCEDURE — 82077 ASSAY SPEC XCP UR&BREATH IA: CPT

## 2023-11-29 PROCEDURE — 96375 TX/PRO/DX INJ NEW DRUG ADDON: CPT

## 2023-11-29 PROCEDURE — 6370000000 HC RX 637 (ALT 250 FOR IP)

## 2023-11-29 PROCEDURE — 93005 ELECTROCARDIOGRAM TRACING: CPT | Performed by: EMERGENCY MEDICINE

## 2023-11-29 PROCEDURE — 96374 THER/PROPH/DIAG INJ IV PUSH: CPT

## 2023-11-29 PROCEDURE — 85730 THROMBOPLASTIN TIME PARTIAL: CPT

## 2023-11-29 PROCEDURE — 85025 COMPLETE CBC W/AUTO DIFF WBC: CPT

## 2023-11-29 PROCEDURE — 80307 DRUG TEST PRSMV CHEM ANLYZR: CPT

## 2023-11-29 PROCEDURE — 74176 CT ABD & PELVIS W/O CONTRAST: CPT

## 2023-11-29 PROCEDURE — 93010 ELECTROCARDIOGRAM REPORT: CPT | Performed by: INTERNAL MEDICINE

## 2023-11-29 PROCEDURE — 82948 REAGENT STRIP/BLOOD GLUCOSE: CPT

## 2023-11-29 PROCEDURE — 85520 HEPARIN ASSAY: CPT

## 2023-11-29 PROCEDURE — 6370000000 HC RX 637 (ALT 250 FOR IP): Performed by: STUDENT IN AN ORGANIZED HEALTH CARE EDUCATION/TRAINING PROGRAM

## 2023-11-29 PROCEDURE — 6360000002 HC RX W HCPCS: Performed by: STUDENT IN AN ORGANIZED HEALTH CARE EDUCATION/TRAINING PROGRAM

## 2023-11-29 PROCEDURE — 96365 THER/PROPH/DIAG IV INF INIT: CPT

## 2023-11-29 PROCEDURE — 99223 1ST HOSP IP/OBS HIGH 75: CPT | Performed by: INTERNAL MEDICINE

## 2023-11-29 PROCEDURE — 82607 VITAMIN B-12: CPT

## 2023-11-29 PROCEDURE — 2580000003 HC RX 258: Performed by: EMERGENCY MEDICINE

## 2023-11-29 PROCEDURE — 99285 EMERGENCY DEPT VISIT HI MDM: CPT

## 2023-11-29 PROCEDURE — 99223 1ST HOSP IP/OBS HIGH 75: CPT | Performed by: STUDENT IN AN ORGANIZED HEALTH CARE EDUCATION/TRAINING PROGRAM

## 2023-11-29 PROCEDURE — 6360000002 HC RX W HCPCS: Performed by: EMERGENCY MEDICINE

## 2023-11-29 PROCEDURE — 84439 ASSAY OF FREE THYROXINE: CPT

## 2023-11-29 PROCEDURE — 76705 ECHO EXAM OF ABDOMEN: CPT

## 2023-11-29 PROCEDURE — 83735 ASSAY OF MAGNESIUM: CPT

## 2023-11-29 PROCEDURE — 93306 TTE W/DOPPLER COMPLETE: CPT | Performed by: INTERNAL MEDICINE

## 2023-11-29 PROCEDURE — 36415 COLL VENOUS BLD VENIPUNCTURE: CPT

## 2023-11-29 PROCEDURE — 85610 PROTHROMBIN TIME: CPT

## 2023-11-29 PROCEDURE — 80053 COMPREHEN METABOLIC PANEL: CPT

## 2023-11-29 PROCEDURE — A4216 STERILE WATER/SALINE, 10 ML: HCPCS | Performed by: EMERGENCY MEDICINE

## 2023-11-29 PROCEDURE — 6360000002 HC RX W HCPCS

## 2023-11-29 PROCEDURE — 84484 ASSAY OF TROPONIN QUANT: CPT

## 2023-11-29 RX ORDER — SODIUM CHLORIDE 0.9 % (FLUSH) 0.9 %
10 SYRINGE (ML) INJECTION EVERY 12 HOURS SCHEDULED
Status: DISCONTINUED | OUTPATIENT
Start: 2023-11-29 | End: 2023-11-30

## 2023-11-29 RX ORDER — POTASSIUM CHLORIDE 20 MEQ/1
40 TABLET, EXTENDED RELEASE ORAL PRN
Status: DISCONTINUED | OUTPATIENT
Start: 2023-11-29 | End: 2023-12-01 | Stop reason: HOSPADM

## 2023-11-29 RX ORDER — INSULIN GLARGINE 100 [IU]/ML
10 INJECTION, SOLUTION SUBCUTANEOUS NIGHTLY
Status: DISCONTINUED | OUTPATIENT
Start: 2023-11-29 | End: 2023-12-01 | Stop reason: HOSPADM

## 2023-11-29 RX ORDER — ACETAMINOPHEN 650 MG/1
650 SUPPOSITORY RECTAL EVERY 6 HOURS PRN
Status: DISCONTINUED | OUTPATIENT
Start: 2023-11-29 | End: 2023-12-01 | Stop reason: HOSPADM

## 2023-11-29 RX ORDER — SODIUM CHLORIDE 0.9 % (FLUSH) 0.9 %
10 SYRINGE (ML) INJECTION PRN
Status: DISCONTINUED | OUTPATIENT
Start: 2023-11-29 | End: 2023-11-30

## 2023-11-29 RX ORDER — SODIUM CHLORIDE 0.9 % (FLUSH) 0.9 %
5-40 SYRINGE (ML) INJECTION EVERY 12 HOURS SCHEDULED
Status: DISCONTINUED | OUTPATIENT
Start: 2023-11-29 | End: 2023-12-01 | Stop reason: HOSPADM

## 2023-11-29 RX ORDER — MEXILETINE HYDROCHLORIDE 150 MG/1
300 CAPSULE ORAL 3 TIMES DAILY
Status: DISCONTINUED | OUTPATIENT
Start: 2023-11-29 | End: 2023-12-01 | Stop reason: HOSPADM

## 2023-11-29 RX ORDER — LORAZEPAM 2 MG/ML
0.5 INJECTION INTRAMUSCULAR EVERY 6 HOURS PRN
Status: DISCONTINUED | OUTPATIENT
Start: 2023-11-29 | End: 2023-12-01 | Stop reason: HOSPADM

## 2023-11-29 RX ORDER — INSULIN LISPRO 100 [IU]/ML
0-4 INJECTION, SOLUTION INTRAVENOUS; SUBCUTANEOUS
Status: DISCONTINUED | OUTPATIENT
Start: 2023-11-29 | End: 2023-12-01 | Stop reason: HOSPADM

## 2023-11-29 RX ORDER — SODIUM CHLORIDE 0.9 % (FLUSH) 0.9 %
5-40 SYRINGE (ML) INJECTION PRN
Status: DISCONTINUED | OUTPATIENT
Start: 2023-11-29 | End: 2023-12-01 | Stop reason: HOSPADM

## 2023-11-29 RX ORDER — THIAMINE HYDROCHLORIDE 100 MG/ML
100 INJECTION, SOLUTION INTRAMUSCULAR; INTRAVENOUS ONCE
Status: COMPLETED | OUTPATIENT
Start: 2023-11-29 | End: 2023-11-29

## 2023-11-29 RX ORDER — LORAZEPAM 2 MG/ML
1 INJECTION INTRAMUSCULAR ONCE
Status: COMPLETED | OUTPATIENT
Start: 2023-11-29 | End: 2023-11-29

## 2023-11-29 RX ORDER — HEPARIN SODIUM 1000 [USP'U]/ML
2000 INJECTION, SOLUTION INTRAVENOUS; SUBCUTANEOUS PRN
Status: DISCONTINUED | OUTPATIENT
Start: 2023-11-29 | End: 2023-11-30

## 2023-11-29 RX ORDER — HEPARIN SODIUM 1000 [USP'U]/ML
4000 INJECTION, SOLUTION INTRAVENOUS; SUBCUTANEOUS PRN
Status: DISCONTINUED | OUTPATIENT
Start: 2023-11-29 | End: 2023-11-30

## 2023-11-29 RX ORDER — WARFARIN SODIUM 5 MG/1
5 TABLET ORAL ONCE
Status: COMPLETED | OUTPATIENT
Start: 2023-11-29 | End: 2023-11-29

## 2023-11-29 RX ORDER — LORAZEPAM 2 MG/1
2 TABLET ORAL
Status: DISCONTINUED | OUTPATIENT
Start: 2023-11-29 | End: 2023-12-01 | Stop reason: HOSPADM

## 2023-11-29 RX ORDER — HEPARIN SODIUM 10000 [USP'U]/100ML
5-30 INJECTION, SOLUTION INTRAVENOUS CONTINUOUS
Status: DISCONTINUED | OUTPATIENT
Start: 2023-11-29 | End: 2023-11-30

## 2023-11-29 RX ORDER — SODIUM CHLORIDE 9 MG/ML
INJECTION, SOLUTION INTRAVENOUS PRN
Status: DISCONTINUED | OUTPATIENT
Start: 2023-11-29 | End: 2023-11-29

## 2023-11-29 RX ORDER — 0.9 % SODIUM CHLORIDE 0.9 %
1000 INTRAVENOUS SOLUTION INTRAVENOUS ONCE
Status: COMPLETED | OUTPATIENT
Start: 2023-11-29 | End: 2023-11-29

## 2023-11-29 RX ORDER — LORAZEPAM 2 MG/ML
2 INJECTION INTRAMUSCULAR
Status: DISCONTINUED | OUTPATIENT
Start: 2023-11-29 | End: 2023-12-01 | Stop reason: HOSPADM

## 2023-11-29 RX ORDER — NICOTINE 21 MG/24HR
1 PATCH, TRANSDERMAL 24 HOURS TRANSDERMAL EVERY 24 HOURS
Status: DISCONTINUED | OUTPATIENT
Start: 2023-11-29 | End: 2023-12-01 | Stop reason: HOSPADM

## 2023-11-29 RX ORDER — ONDANSETRON 4 MG/1
4 TABLET, ORALLY DISINTEGRATING ORAL EVERY 8 HOURS PRN
Status: DISCONTINUED | OUTPATIENT
Start: 2023-11-29 | End: 2023-12-01 | Stop reason: HOSPADM

## 2023-11-29 RX ORDER — ISOSORBIDE MONONITRATE 60 MG/1
120 TABLET, EXTENDED RELEASE ORAL DAILY
Status: DISCONTINUED | OUTPATIENT
Start: 2023-11-29 | End: 2023-12-01 | Stop reason: HOSPADM

## 2023-11-29 RX ORDER — METOPROLOL SUCCINATE 100 MG/1
100 TABLET, EXTENDED RELEASE ORAL 2 TIMES DAILY
Status: DISCONTINUED | OUTPATIENT
Start: 2023-11-29 | End: 2023-12-01 | Stop reason: HOSPADM

## 2023-11-29 RX ORDER — INSULIN LISPRO 100 [IU]/ML
0-4 INJECTION, SOLUTION INTRAVENOUS; SUBCUTANEOUS NIGHTLY
Status: DISCONTINUED | OUTPATIENT
Start: 2023-11-29 | End: 2023-12-01 | Stop reason: HOSPADM

## 2023-11-29 RX ORDER — LORAZEPAM 2 MG/ML
3 INJECTION INTRAMUSCULAR
Status: DISCONTINUED | OUTPATIENT
Start: 2023-11-29 | End: 2023-12-01 | Stop reason: HOSPADM

## 2023-11-29 RX ORDER — PHENOBARBITAL SODIUM 65 MG/ML
130 INJECTION INTRAMUSCULAR
Status: DISCONTINUED | OUTPATIENT
Start: 2023-11-29 | End: 2023-11-29

## 2023-11-29 RX ORDER — LORAZEPAM 1 MG/1
1 TABLET ORAL
Status: DISCONTINUED | OUTPATIENT
Start: 2023-11-29 | End: 2023-12-01 | Stop reason: HOSPADM

## 2023-11-29 RX ORDER — POLYETHYLENE GLYCOL 3350 17 G/17G
17 POWDER, FOR SOLUTION ORAL DAILY PRN
Status: DISCONTINUED | OUTPATIENT
Start: 2023-11-29 | End: 2023-12-01 | Stop reason: HOSPADM

## 2023-11-29 RX ORDER — MORPHINE SULFATE 4 MG/ML
4 INJECTION, SOLUTION INTRAMUSCULAR; INTRAVENOUS ONCE
Status: COMPLETED | OUTPATIENT
Start: 2023-11-29 | End: 2023-11-29

## 2023-11-29 RX ORDER — DEXTROSE MONOHYDRATE 100 MG/ML
INJECTION, SOLUTION INTRAVENOUS CONTINUOUS PRN
Status: DISCONTINUED | OUTPATIENT
Start: 2023-11-29 | End: 2023-12-01 | Stop reason: HOSPADM

## 2023-11-29 RX ORDER — HEPARIN SODIUM 1000 [USP'U]/ML
4000 INJECTION, SOLUTION INTRAVENOUS; SUBCUTANEOUS ONCE
Status: COMPLETED | OUTPATIENT
Start: 2023-11-29 | End: 2023-11-29

## 2023-11-29 RX ORDER — IBUPROFEN 600 MG/1
1 TABLET ORAL PRN
Status: DISCONTINUED | OUTPATIENT
Start: 2023-11-29 | End: 2023-11-29

## 2023-11-29 RX ORDER — HYDRALAZINE HYDROCHLORIDE 20 MG/ML
5 INJECTION INTRAMUSCULAR; INTRAVENOUS EVERY 6 HOURS PRN
Status: DISCONTINUED | OUTPATIENT
Start: 2023-11-29 | End: 2023-12-01 | Stop reason: HOSPADM

## 2023-11-29 RX ORDER — METOPROLOL SUCCINATE 100 MG/1
100 TABLET, EXTENDED RELEASE ORAL DAILY
Status: DISCONTINUED | OUTPATIENT
Start: 2023-11-29 | End: 2023-11-29

## 2023-11-29 RX ORDER — THIAMINE HYDROCHLORIDE 100 MG/ML
100 INJECTION, SOLUTION INTRAMUSCULAR; INTRAVENOUS DAILY
Status: DISCONTINUED | OUTPATIENT
Start: 2023-11-29 | End: 2023-12-01 | Stop reason: HOSPADM

## 2023-11-29 RX ORDER — SODIUM CHLORIDE 9 MG/ML
INJECTION, SOLUTION INTRAVENOUS PRN
Status: DISCONTINUED | OUTPATIENT
Start: 2023-11-29 | End: 2023-12-01 | Stop reason: HOSPADM

## 2023-11-29 RX ORDER — PHENOBARBITAL SODIUM 65 MG/ML
65 INJECTION INTRAMUSCULAR 2 TIMES DAILY
Status: DISCONTINUED | OUTPATIENT
Start: 2023-11-29 | End: 2023-11-29

## 2023-11-29 RX ORDER — MULTIVITAMIN WITH IRON
1 TABLET ORAL DAILY
Status: DISCONTINUED | OUTPATIENT
Start: 2023-11-29 | End: 2023-12-01 | Stop reason: HOSPADM

## 2023-11-29 RX ORDER — BUMETANIDE 1 MG/1
2 TABLET ORAL DAILY
Status: DISCONTINUED | OUTPATIENT
Start: 2023-11-29 | End: 2023-12-01 | Stop reason: HOSPADM

## 2023-11-29 RX ORDER — ONDANSETRON 2 MG/ML
4 INJECTION INTRAMUSCULAR; INTRAVENOUS ONCE
Status: COMPLETED | OUTPATIENT
Start: 2023-11-29 | End: 2023-11-29

## 2023-11-29 RX ORDER — MAGNESIUM SULFATE IN WATER 40 MG/ML
2000 INJECTION, SOLUTION INTRAVENOUS PRN
Status: DISCONTINUED | OUTPATIENT
Start: 2023-11-29 | End: 2023-12-01 | Stop reason: HOSPADM

## 2023-11-29 RX ORDER — ACETAMINOPHEN 325 MG/1
650 TABLET ORAL EVERY 6 HOURS PRN
Status: DISCONTINUED | OUTPATIENT
Start: 2023-11-29 | End: 2023-12-01 | Stop reason: HOSPADM

## 2023-11-29 RX ORDER — POTASSIUM CHLORIDE 20 MEQ/1
20 TABLET, EXTENDED RELEASE ORAL DAILY
Status: DISCONTINUED | OUTPATIENT
Start: 2023-11-29 | End: 2023-12-01 | Stop reason: HOSPADM

## 2023-11-29 RX ORDER — LORAZEPAM 2 MG/ML
1 INJECTION INTRAMUSCULAR
Status: DISCONTINUED | OUTPATIENT
Start: 2023-11-29 | End: 2023-12-01 | Stop reason: HOSPADM

## 2023-11-29 RX ORDER — LORAZEPAM 2 MG/ML
4 INJECTION INTRAMUSCULAR
Status: DISCONTINUED | OUTPATIENT
Start: 2023-11-29 | End: 2023-12-01 | Stop reason: HOSPADM

## 2023-11-29 RX ORDER — ONDANSETRON 2 MG/ML
4 INJECTION INTRAMUSCULAR; INTRAVENOUS EVERY 6 HOURS PRN
Status: DISCONTINUED | OUTPATIENT
Start: 2023-11-29 | End: 2023-12-01 | Stop reason: HOSPADM

## 2023-11-29 RX ORDER — POTASSIUM CHLORIDE 7.45 MG/ML
10 INJECTION INTRAVENOUS PRN
Status: DISCONTINUED | OUTPATIENT
Start: 2023-11-29 | End: 2023-12-01 | Stop reason: HOSPADM

## 2023-11-29 RX ORDER — HYDROXYZINE HYDROCHLORIDE 50 MG/ML
25 INJECTION, SOLUTION INTRAMUSCULAR ONCE
Status: COMPLETED | OUTPATIENT
Start: 2023-11-29 | End: 2023-11-29

## 2023-11-29 RX ORDER — LABETALOL HYDROCHLORIDE 5 MG/ML
20 INJECTION, SOLUTION INTRAVENOUS ONCE
Status: COMPLETED | OUTPATIENT
Start: 2023-11-29 | End: 2023-11-29

## 2023-11-29 RX ORDER — LORAZEPAM 2 MG/1
4 TABLET ORAL
Status: DISCONTINUED | OUTPATIENT
Start: 2023-11-29 | End: 2023-12-01 | Stop reason: HOSPADM

## 2023-11-29 RX ADMIN — LORAZEPAM 0.5 MG: 2 INJECTION INTRAMUSCULAR; INTRAVENOUS at 18:53

## 2023-11-29 RX ADMIN — HEPARIN SODIUM 4000 UNITS: 1000 INJECTION INTRAVENOUS; SUBCUTANEOUS at 12:49

## 2023-11-29 RX ADMIN — PANTOPRAZOLE SODIUM 80 MG: 40 INJECTION, POWDER, FOR SOLUTION INTRAVENOUS at 09:12

## 2023-11-29 RX ADMIN — INSULIN GLARGINE 10 UNITS: 100 INJECTION, SOLUTION SUBCUTANEOUS at 19:53

## 2023-11-29 RX ADMIN — MEXILETINE HYDROCHLORIDE 300 MG: 150 CAPSULE ORAL at 19:48

## 2023-11-29 RX ADMIN — METOPROLOL SUCCINATE 100 MG: 100 TABLET, EXTENDED RELEASE ORAL at 17:00

## 2023-11-29 RX ADMIN — TICAGRELOR 90 MG: 90 TABLET ORAL at 17:04

## 2023-11-29 RX ADMIN — ONDANSETRON 4 MG: 2 INJECTION INTRAMUSCULAR; INTRAVENOUS at 09:29

## 2023-11-29 RX ADMIN — PHENOBARBITAL SODIUM 65 MG: 65 INJECTION INTRAMUSCULAR at 15:12

## 2023-11-29 RX ADMIN — POTASSIUM CHLORIDE 20 MEQ: 1500 TABLET, EXTENDED RELEASE ORAL at 17:04

## 2023-11-29 RX ADMIN — HYDROXYZINE HYDROCHLORIDE 25 MG: 50 INJECTION, SOLUTION INTRAMUSCULAR at 21:49

## 2023-11-29 RX ADMIN — INSULIN LISPRO 2 UNITS: 100 INJECTION, SOLUTION INTRAVENOUS; SUBCUTANEOUS at 18:08

## 2023-11-29 RX ADMIN — LORAZEPAM 1 MG: 2 INJECTION INTRAMUSCULAR; INTRAVENOUS at 09:54

## 2023-11-29 RX ADMIN — MORPHINE SULFATE 4 MG: 4 INJECTION, SOLUTION INTRAMUSCULAR; INTRAVENOUS at 09:29

## 2023-11-29 RX ADMIN — LORAZEPAM 1 MG: 2 INJECTION INTRAMUSCULAR; INTRAVENOUS at 16:49

## 2023-11-29 RX ADMIN — WARFARIN SODIUM 5 MG: 5 TABLET ORAL at 21:49

## 2023-11-29 RX ADMIN — MEXILETINE HYDROCHLORIDE 300 MG: 150 CAPSULE ORAL at 16:56

## 2023-11-29 RX ADMIN — PANTOPRAZOLE SODIUM 8 MG/HR: 40 INJECTION, POWDER, FOR SOLUTION INTRAVENOUS at 21:41

## 2023-11-29 RX ADMIN — AMIODARONE HYDROCHLORIDE 150 MG: 1.5 INJECTION, SOLUTION INTRAVENOUS at 08:47

## 2023-11-29 RX ADMIN — SODIUM CHLORIDE 1000 ML: 9 INJECTION, SOLUTION INTRAVENOUS at 08:38

## 2023-11-29 RX ADMIN — PHENOBARBITAL SODIUM 130 MG: 65 INJECTION INTRAMUSCULAR at 12:48

## 2023-11-29 RX ADMIN — HEPARIN SODIUM 7 UNITS/KG/HR: 10000 INJECTION, SOLUTION INTRAVENOUS at 12:52

## 2023-11-29 RX ADMIN — ISOSORBIDE MONONITRATE 120 MG: 60 TABLET, EXTENDED RELEASE ORAL at 16:56

## 2023-11-29 RX ADMIN — PANTOPRAZOLE SODIUM 8 MG/HR: 40 INJECTION, POWDER, FOR SOLUTION INTRAVENOUS at 12:48

## 2023-11-29 RX ADMIN — BUMETANIDE 2 MG: 1 TABLET ORAL at 16:55

## 2023-11-29 RX ADMIN — AMIODARONE HYDROCHLORIDE 0.5 MG/MIN: 1.8 INJECTION, SOLUTION INTRAVENOUS at 15:14

## 2023-11-29 RX ADMIN — AMIODARONE HYDROCHLORIDE 1 MG/MIN: 1.8 INJECTION, SOLUTION INTRAVENOUS at 09:00

## 2023-11-29 RX ADMIN — THIAMINE HYDROCHLORIDE 100 MG: 100 INJECTION, SOLUTION INTRAMUSCULAR; INTRAVENOUS at 08:56

## 2023-11-29 RX ADMIN — LABETALOL HYDROCHLORIDE 20 MG: 5 INJECTION, SOLUTION INTRAVENOUS at 16:46

## 2023-11-29 ASSESSMENT — PAIN SCALES - GENERAL
PAINLEVEL_OUTOF10: 6
PAINLEVEL_OUTOF10: 6
PAINLEVEL_OUTOF10: 5
PAINLEVEL_OUTOF10: 6

## 2023-11-29 ASSESSMENT — ENCOUNTER SYMPTOMS
COLOR CHANGE: 0
APNEA: 0
ANAL BLEEDING: 0
DIARRHEA: 0
CONSTIPATION: 0
COUGH: 0
BACK PAIN: 0
FACIAL SWELLING: 0
SHORTNESS OF BREATH: 1
CHEST TIGHTNESS: 0
ABDOMINAL PAIN: 1
BLOOD IN STOOL: 0
CHOKING: 0
ABDOMINAL DISTENTION: 0

## 2023-11-29 ASSESSMENT — PAIN DESCRIPTION - LOCATION
LOCATION: ABDOMEN

## 2023-11-29 ASSESSMENT — PAIN - FUNCTIONAL ASSESSMENT
PAIN_FUNCTIONAL_ASSESSMENT: 0-10

## 2023-11-29 NOTE — ED NOTES
Pt has been given phenobarbital as ordered. Pt is still very anxious and irritable. Pt and family state that it has not work for him in the past due to him being prescribed xanax.  Message sent to Murray Bartholomew, ALETA  11/29/23 6332

## 2023-11-29 NOTE — PROGRESS NOTES
5:25 PM    This SW did attempt to speak with patient to complete addictions consult. Patient was just brought to the floor and staff is in room completing admission at this time. MAAME will continue to follow for consult completion.

## 2023-11-29 NOTE — PROGRESS NOTES
Will need mri cardiology form completed by pts cardiologist before MRI can be done due to pt having pacemaker. MRI cardiology form faxed to  [phone].

## 2023-11-29 NOTE — PROGRESS NOTES
Warfarin Pharmacy Consult Note    Siomara Andres is a 62 y.o. male for whom pharmacy has been asked to manage warfarin therapy. Consulting Provider: Dr. Galdino Valdovinos DO  Warfarin Indication:  Atrial Fibrillation   Target INR range: 2.0-3.0   Warfarin dose prior to admission: 2.5 mg MWF, 5 mg all other days  Outpatient warfarin provider: [address] - left a message to verify dose regimen (Awaiting call back)      Recent Labs     11/29/23  0918   INR 1.74*     Recent Labs     11/29/23  0900   HGB 10.6*        Concurrent anticoagulants/antiplatelets: heparin drip, Brilinta   Significant warfarin drug-drug interactions: amiodarone IV, multivitamin, phenobarbital     Date INR Warfarin Dose   11/29/2023 1.74 5 mg                                   INR will be monitored routinely until therapeutic INR is achieved. Pharmacy will continue to follow.  Thank you for the consult,   Bonifacio Lundborg, PharmD 11/29/2023 3:17 PM

## 2023-11-29 NOTE — ED PROVIDER NOTES
[facility] [address]  Phone: [address] [facility]       Chief Complaint   Patient presents with    Alcohol Intoxication    Abdominal Pain       Nurses Notes reviewed and I agree except as noted in the HPI. HISTORY OF PRESENT ILLNESS    Lakisha Bustos is a 62 y.o. male. Patient arrives in the emergency department complaining of abdominal pain, is notably intoxicated. Admits to drinking alcohol prior to coming in. He is also having palpitations and is noted to have episodes of bigeminy a lot of PVCs on the monitor. He has a history of cardiomyopathy and a pacemaker defibrillator. Admits to noncompliance with his medications. REVIEW OF SYSTEMS         No current chest pain         PAST MEDICAL HISTORY    has a past medical history of Acute systolic CHF (congestive heart failure) ([address]), Alcohol abuse, Anomalous origin of right coronary artery, CAD (coronary artery disease), Chronic kidney disease, Diabetes mellitus ([address]), Drug abuse ([address]), GERD (gastroesophageal reflux disease), Hypertension, Intradural extramedullary spinal tumor, Medtronic dual icd , Neuromuscular disorder ([address]), Paroxysmal atrial fibrillation ([address]), Severe obesity (BMI 35.0-39. 9) with comorbidity ([address]), and V-tach ([address]). SURGICAL HISTORY      has a past surgical history that includes Cardiac catheterization (03/20/2014); Coronary artery bypass graft (05/09/2014); other surgical history (Left, 10/21/2014); EKG 12 Lead (07/17/2015); Cardiac defibrillator placement (10/13/2015); vascular surgery; pacemaker placement; pr laminectomy w/o ffd > 2 vert seg lumbar (N/A, 01/16/2018); and Cardiac electrophysiology study and ablation.     CURRENT MEDICATIONS       Current Discharge Medication List        CONTINUE these medications which have NOT CHANGED    Details   ALPRAZolam (XANAX) 0.5 MG tablet TAKE 1 TABLET BY MOUTH AT BEDTIME FOR ANXIETY  Qty: 30 tablet, Refills: 5    Associated Diagnoses:

## 2023-11-29 NOTE — ED NOTES
Utilize [facility] alcohol withdrawal scale (Based on [place] Modified Alcohol Withdrawal Scale). Tabulate score based on classifications including Tremor, Sweating, Hallucination, Orientation, and Agitation. Tremor: 0  Sweatin  Hallucinations: 0  Orientation: 0  Agitation: 1  Total Score: 1  Action perform as described below     Tremor:  No tremor is 0 points. Tremor on movement is 1 point. Tremor at rest is 2 points. Sweating: No Sweat 0 points. Moist is 1 point. Drenching sweats is 2 points. Hallucinations: No present 0 points. Dissuadable is 1 point. Not dissuadable is 2 points. Orientation: Oriented 0 points. Vague/detached 1 point. Disoriented/no contact 2 points. Agitation: Calm 0 points. Anxious 1 point. Panicky 2 points. Check scale every 2 hours. Discontinue scoring with 4 consecutive scorings of 0. Scale 0: No phenobarbital given. Re-assess every 60 minutes as needed. Scale 1-3: Phenobarbital 130 mg IV over 3 minutes. Re-assess every 60 minutes as needed. May administer every 60 minutes to a maximum dose of phenobarbital 1040 mg in 24 hours! Scale 4-8: Phenobarbital 260 mg IV over 5 minutes. Re-assess every 60 minutes as needed. May administer every 60 minutes to a maximum dose of phenobarbital 1040mg in 24 hours! Scale 9-10: Transfer to ICU (if not already in ICU). Administer 10mg/kg phenobarbital IV over 60 minutes. Maximum dose phenobarbital is 1040mg in 24 hours!        Khadra Coleman RN  23 9430

## 2023-11-29 NOTE — ED TRIAGE NOTES
Pt presents to the ED via EMS from home for alcohol intoxication and abdominal pain. Pt states he started drinking 7 days ago and his last drink was around 0700 today. Pt states in the last week he has had 8 bottles of apple crown and started drinking beer last night. Pt states prior to a week ago he was sober for \"a long time\". Pt states he has been dry heaving and had dark red vomit yesterday morning. Pt states he has he only had a small amount of noodles since he started drinking. Pt has not taken his medication in a week.

## 2023-11-29 NOTE — ED NOTES
Utilize [facility] alcohol withdrawal scale (Based on [place] Modified Alcohol Withdrawal Scale). Tabulate score based on classifications including Tremor, Sweating, Hallucination, Orientation, and Agitation. Tremor: 0  Sweatin  Hallucinations: 0  Orientation: 0  Agitation: 1  Total Score: 1  Action perform as described below     Tremor:  No tremor is 0 points. Tremor on movement is 1 point. Tremor at rest is 2 points. Sweating: No Sweat 0 points. Moist is 1 point. Drenching sweats is 2 points. Hallucinations: No present 0 points. Dissuadable is 1 point. Not dissuadable is 2 points. Orientation: Oriented 0 points. Vague/detached 1 point. Disoriented/no contact 2 points. Agitation: Calm 0 points. Anxious 1 point. Panicky 2 points. Check scale every 2 hours. Discontinue scoring with 4 consecutive scorings of 0. Scale 0: No phenobarbital given. Re-assess every 60 minutes as needed. Scale 1-3: Phenobarbital 130 mg IV over 3 minutes. Re-assess every 60 minutes as needed. May administer every 60 minutes to a maximum dose of phenobarbital 1040 mg in 24 hours! Scale 4-8: Phenobarbital 260 mg IV over 5 minutes. Re-assess every 60 minutes as needed. May administer every 60 minutes to a maximum dose of phenobarbital 1040mg in 24 hours! Scale 9-10: Transfer to ICU (if not already in ICU). Administer 10mg/kg phenobarbital IV over 60 minutes. Maximum dose phenobarbital is 1040mg in 24 hours!        Gilberto Monet RN  23 2430

## 2023-11-29 NOTE — ED NOTES
Pt given pain medication per orders. Pt repeating \"please help me\". Pt requesting medication to help him relax.      Jayjay Ramos RN  11/29/23 3420

## 2023-11-29 NOTE — PROCEDURES
Report for Pacemaker was handed to [facility] EMERGENCY [facility] AT [place].  Madeline Abrams CET No

## 2023-11-29 NOTE — CONSULTS
[facility] [address] [place] [address]    Patient's Name/Date of Birth: Siomara Andres / 1965 (85 y.o.)    Date: November 29, 2023     Referring Provider: Lorenza Chinchilla MD    CHIEF COMPLAINT: abdominal pain. Cardiology consulted for Vtach. HPI: This is a pleasant 62 y.o. male with PMH V. tach s/p ablation 2014, s/p ICD, pancreatitis, permanent A-fib, HFrEF, CAD s/p CABG x3, PCI to LAD 2020, HTN, IDDM2, CKD, alcohol abuse presents with chief complaint of abdominal pain. He notes vague diffuse abdominal pain. He denies any palpitations, chest pain, shortness of breath, loss of consciousness, dizziness, or lightheadedness. He endorses mild orthopnea, leg edema, but denies PND. He states that he has been drinking more heavily for the past week, approximately a bottle of whiskey/day. He has not taken any of his medications for the past week. His family in the room states that he may have vomited dark vomitus in the past couple of days. ETOH 0.19 on arrival.     Troponin on arrival 338, repeat 310, 300. Appears to be chronically elevated from 130-200. EKG Is underlying sinus tachycardia with short runs of nonsustained vtach    Echo: 11/23/2023. LVEF 30% with global hypokinesis. LV size mildly increased with normal wall thickness. Left atrium mildly to moderately dilated. [facility]. 10/20/2020. PCI to LAD with one GENNARO. PCI of diag.     All labs, EKG's, diagnostic testing and images as well as cardiac cath, stress testing were reviewed during this encounter    Past Medical History:   Diagnosis Date    Acute systolic CHF (congestive heart failure) ([address] 07/16/2015    Alcohol abuse     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    CAD (coronary artery disease) 03/25/2014    s/p CABG in may 2014    Chronic kidney disease     Diabetes mellitus ([address] [facility])     Drug abuse ([facility])     hx of cacaine abuse, stopped abusing drugs in 2012    GERD (gastroesophageal reflux disease)     Hypertension Intradural extramedullary spinal tumor     Medtronic dual icd      Neuromuscular disorder (HCC)     Paroxysmal atrial fibrillation ([address]) 07/22/2014    Severe obesity (BMI 35.0-39. 9) with comorbidity ([address]) 05/22/2023    V-tach [facility])     s/p ablation in dec 2014     Past Surgical History:   Procedure Laterality Date    CARDIAC CATHETERIZATION  03/20/2014    [facility]    CARDIAC DEFIBRILLATOR PLACEMENT  10/13/2015    MEDTRONIC [place], MRI CONDITIONAL ICD    CARDIAC ELECTROPHYSIOLOGY STUDY AND ABLATION      CORONARY ARTERY BYPASS GRAFT  05/09/2014    3 bypass    EKG 12-LEAD  07/17/2015         OTHER SURGICAL HISTORY Left 10/21/2014    Left Bursectomy, I & D Left Elbow - Dr. Hurst Smaller W/O FFD > 2 VERT SEG LUMBAR N/A 01/16/2018    LUMBAR AND SACRAL LAMINECTOMY, REMOVAL OF INTRASPINAL TUMORS performed by Alicia Hsu MD at [facility] harvest from legs     Current Facility-Administered Medications   Medication Dose Route Frequency Provider Last Rate Last Admin    amiodarone (NEXTERONE) 360 mg in dextrose 5% 200 ml  1 mg/min IntraVENous Continuous Earney Bar, DO 33.3 mL/hr at 11/29/23 0900 1 mg/min at 11/29/23 0900    Followed by    amiodarone (NEXTERONE) 360 mg in dextrose 5% 200 ml  0.5 mg/min IntraVENous Continuous Earney Bar, DO        pantoprazole (PROTONIX) 80 mg in sodium chloride 0.9 % 100 mL infusion  8 mg/hr IntraVENous Continuous Earney Bar, DO 10 mL/hr at 11/29/23 1248 8 mg/hr at 11/29/23 1248    iopamidol (ISOVUE-370) 76 % injection 80 mL  80 mL IntraVENous ONCE PRN Earney Bar, DO        sodium chloride flush 0.9 % injection 5-40 mL  5-40 mL IntraVENous 2 times per day Max, Rita V, DO        sodium chloride flush 0.9 % injection 5-40 mL  5-40 mL IntraVENous PRN Max, Rita V, DO        0.9 % sodium chloride infusion   IntraVENous PRN Max, Rita V, DO        thiamine (B-1) injection 100 mg  100 mg IntraVENous Daily Max, Rita V, DO

## 2023-11-30 ENCOUNTER — APPOINTMENT (OUTPATIENT)
Dept: MRI IMAGING | Age: 58
End: 2023-11-30
Payer: COMMERCIAL

## 2023-11-30 LAB
AMPHETAMINES UR QL SCN: NEGATIVE
ANION GAP SERPL CALC-SCNC: 12 MEQ/L (ref 8–16)
BARBITURATES UR QL SCN: POSITIVE
BENZODIAZ UR QL SCN: NEGATIVE
BUN SERPL-MCNC: 38 MG/DL (ref 7–22)
BZE UR QL SCN: NEGATIVE
CALCIUM SERPL-MCNC: 8.5 MG/DL (ref 8.5–10.5)
CANNABINOIDS UR QL SCN: NEGATIVE
CHLORIDE SERPL-SCNC: 107 MEQ/L (ref 98–111)
CO2 SERPL-SCNC: 18 MEQ/L (ref 23–33)
CREAT SERPL-MCNC: 2.8 MG/DL (ref 0.4–1.2)
DEPRECATED MEAN GLUCOSE BLD GHB EST-ACNC: 147 MG/DL (ref 70–126)
DEPRECATED RDW RBC AUTO: 53.2 FL (ref 35–45)
ERYTHROCYTE [DISTWIDTH] IN BLOOD BY AUTOMATED COUNT: 14.8 % (ref 11.5–14.5)
FENTANYL: NEGATIVE
GFR SERPL CREATININE-BSD FRML MDRD: 25 ML/MIN/1.73M2
GLUCOSE BLD STRIP.AUTO-MCNC: 176 MG/DL (ref 70–108)
GLUCOSE BLD STRIP.AUTO-MCNC: 179 MG/DL (ref 70–108)
GLUCOSE BLD STRIP.AUTO-MCNC: 203 MG/DL (ref 70–108)
GLUCOSE BLD STRIP.AUTO-MCNC: 276 MG/DL (ref 70–108)
GLUCOSE SERPL-MCNC: 169 MG/DL (ref 70–108)
HBA1C MFR BLD HPLC: 6.9 % (ref 4.4–6.4)
HCT VFR BLD AUTO: 31.2 % (ref 42–52)
HEPARIN UNFRACTIONATED: 0.09 U/ML (ref 0.3–0.7)
HEPARIN UNFRACTIONATED: 0.33 U/ML (ref 0.3–0.7)
HGB BLD-MCNC: 9.8 GM/DL (ref 14–18)
INR PPP: 1.7 (ref 0.85–1.13)
MAGNESIUM SERPL-MCNC: 2.5 MG/DL (ref 1.6–2.4)
MCH RBC QN AUTO: 30.4 PG (ref 26–33)
MCHC RBC AUTO-ENTMCNC: 31.4 GM/DL (ref 32.2–35.5)
MCV RBC AUTO: 96.9 FL (ref 80–94)
OPIATES UR QL SCN: POSITIVE
OXYCODONE: NEGATIVE
PCP UR QL SCN: NEGATIVE
PLATELET # BLD AUTO: 170 THOU/MM3 (ref 130–400)
PMV BLD AUTO: 10 FL (ref 9.4–12.4)
POTASSIUM SERPL-SCNC: 4 MEQ/L (ref 3.5–5.2)
RBC # BLD AUTO: 3.22 MILL/MM3 (ref 4.7–6.1)
SODIUM SERPL-SCNC: 137 MEQ/L (ref 135–145)
TRANSFERRIN SERPL-MCNC: 255 MG/DL (ref 200–360)
WBC # BLD AUTO: 9 THOU/MM3 (ref 4.8–10.8)

## 2023-11-30 PROCEDURE — 99232 SBSQ HOSP IP/OBS MODERATE 35: CPT | Performed by: NURSE PRACTITIONER

## 2023-11-30 PROCEDURE — 2580000003 HC RX 258: Performed by: EMERGENCY MEDICINE

## 2023-11-30 PROCEDURE — 6370000000 HC RX 637 (ALT 250 FOR IP): Performed by: STUDENT IN AN ORGANIZED HEALTH CARE EDUCATION/TRAINING PROGRAM

## 2023-11-30 PROCEDURE — 83735 ASSAY OF MAGNESIUM: CPT

## 2023-11-30 PROCEDURE — 6370000000 HC RX 637 (ALT 250 FOR IP)

## 2023-11-30 PROCEDURE — 70551 MRI BRAIN STEM W/O DYE: CPT

## 2023-11-30 PROCEDURE — 85610 PROTHROMBIN TIME: CPT

## 2023-11-30 PROCEDURE — 2500000003 HC RX 250 WO HCPCS: Performed by: EMERGENCY MEDICINE

## 2023-11-30 PROCEDURE — 99233 SBSQ HOSP IP/OBS HIGH 50: CPT | Performed by: HOSPITALIST

## 2023-11-30 PROCEDURE — 6360000002 HC RX W HCPCS

## 2023-11-30 PROCEDURE — 6360000002 HC RX W HCPCS: Performed by: EMERGENCY MEDICINE

## 2023-11-30 PROCEDURE — 85027 COMPLETE CBC AUTOMATED: CPT

## 2023-11-30 PROCEDURE — 6360000002 HC RX W HCPCS: Performed by: STUDENT IN AN ORGANIZED HEALTH CARE EDUCATION/TRAINING PROGRAM

## 2023-11-30 PROCEDURE — 36415 COLL VENOUS BLD VENIPUNCTURE: CPT

## 2023-11-30 PROCEDURE — 80048 BASIC METABOLIC PNL TOTAL CA: CPT

## 2023-11-30 PROCEDURE — 6370000000 HC RX 637 (ALT 250 FOR IP): Performed by: PHARMACIST

## 2023-11-30 PROCEDURE — C9113 INJ PANTOPRAZOLE SODIUM, VIA: HCPCS | Performed by: EMERGENCY MEDICINE

## 2023-11-30 PROCEDURE — 2140000000 HC CCU INTERMEDIATE R&B

## 2023-11-30 PROCEDURE — 85520 HEPARIN ASSAY: CPT

## 2023-11-30 PROCEDURE — 82948 REAGENT STRIP/BLOOD GLUCOSE: CPT

## 2023-11-30 PROCEDURE — 83036 HEMOGLOBIN GLYCOSYLATED A1C: CPT

## 2023-11-30 RX ORDER — WARFARIN SODIUM 3 MG/1
6 TABLET ORAL ONCE
Status: COMPLETED | OUTPATIENT
Start: 2023-11-30 | End: 2023-11-30

## 2023-11-30 RX ORDER — PANTOPRAZOLE SODIUM 40 MG/1
40 TABLET, DELAYED RELEASE ORAL
Status: DISCONTINUED | OUTPATIENT
Start: 2023-11-30 | End: 2023-12-01 | Stop reason: HOSPADM

## 2023-11-30 RX ORDER — FUROSEMIDE 10 MG/ML
20 INJECTION INTRAMUSCULAR; INTRAVENOUS 2 TIMES DAILY
Status: DISCONTINUED | OUTPATIENT
Start: 2023-11-30 | End: 2023-12-01 | Stop reason: HOSPADM

## 2023-11-30 RX ADMIN — AMIODARONE HYDROCHLORIDE 0.5 MG/MIN: 1.8 INJECTION, SOLUTION INTRAVENOUS at 03:53

## 2023-11-30 RX ADMIN — LORAZEPAM 1 MG: 2 INJECTION INTRAMUSCULAR; INTRAVENOUS at 00:59

## 2023-11-30 RX ADMIN — WARFARIN SODIUM 6 MG: 3 TABLET ORAL at 17:20

## 2023-11-30 RX ADMIN — Medication 1 TABLET: at 09:31

## 2023-11-30 RX ADMIN — PANTOPRAZOLE SODIUM 8 MG/HR: 40 INJECTION, POWDER, FOR SOLUTION INTRAVENOUS at 06:43

## 2023-11-30 RX ADMIN — LORAZEPAM 0.5 MG: 2 INJECTION INTRAMUSCULAR; INTRAVENOUS at 15:41

## 2023-11-30 RX ADMIN — LORAZEPAM 3 MG: 1 TABLET ORAL at 23:43

## 2023-11-30 RX ADMIN — POTASSIUM CHLORIDE 20 MEQ: 1500 TABLET, EXTENDED RELEASE ORAL at 09:31

## 2023-11-30 RX ADMIN — TICAGRELOR 90 MG: 90 TABLET ORAL at 09:31

## 2023-11-30 RX ADMIN — BUMETANIDE 2 MG: 1 TABLET ORAL at 09:31

## 2023-11-30 RX ADMIN — LORAZEPAM 1 MG: 1 TABLET ORAL at 11:29

## 2023-11-30 RX ADMIN — LORAZEPAM 1 MG: 1 TABLET ORAL at 20:35

## 2023-11-30 RX ADMIN — MEXILETINE HYDROCHLORIDE 300 MG: 150 CAPSULE ORAL at 09:31

## 2023-11-30 RX ADMIN — HEPARIN SODIUM 4000 UNITS: 1000 INJECTION INTRAVENOUS; SUBCUTANEOUS at 01:19

## 2023-11-30 RX ADMIN — LORAZEPAM 0.5 MG: 2 INJECTION INTRAMUSCULAR; INTRAVENOUS at 09:43

## 2023-11-30 RX ADMIN — FUROSEMIDE 20 MG: 10 INJECTION, SOLUTION INTRAMUSCULAR; INTRAVENOUS at 18:08

## 2023-11-30 RX ADMIN — INSULIN GLARGINE 10 UNITS: 100 INJECTION, SOLUTION SUBCUTANEOUS at 20:41

## 2023-11-30 RX ADMIN — METOPROLOL SUCCINATE 100 MG: 100 TABLET, EXTENDED RELEASE ORAL at 20:35

## 2023-11-30 RX ADMIN — METOPROLOL SUCCINATE 100 MG: 100 TABLET, EXTENDED RELEASE ORAL at 09:31

## 2023-11-30 RX ADMIN — THIAMINE HYDROCHLORIDE 100 MG: 100 INJECTION, SOLUTION INTRAMUSCULAR; INTRAVENOUS at 09:30

## 2023-11-30 RX ADMIN — ISOSORBIDE MONONITRATE 120 MG: 60 TABLET, EXTENDED RELEASE ORAL at 09:31

## 2023-11-30 RX ADMIN — PANTOPRAZOLE SODIUM 40 MG: 40 TABLET, DELAYED RELEASE ORAL at 09:32

## 2023-11-30 RX ADMIN — HYDRALAZINE HYDROCHLORIDE 5 MG: 20 INJECTION, SOLUTION INTRAMUSCULAR; INTRAVENOUS at 00:24

## 2023-11-30 RX ADMIN — INSULIN LISPRO 1 UNITS: 100 INJECTION, SOLUTION INTRAVENOUS; SUBCUTANEOUS at 09:32

## 2023-11-30 RX ADMIN — TICAGRELOR 90 MG: 90 TABLET ORAL at 20:35

## 2023-11-30 RX ADMIN — INSULIN LISPRO 2 UNITS: 100 INJECTION, SOLUTION INTRAVENOUS; SUBCUTANEOUS at 17:19

## 2023-11-30 NOTE — PROGRESS NOTES
Warfarin Pharmacy Consult Note    Warfarin Indication: Atrial fibrillation or Atrial flutter  Target INR: 2.0-3.0  Dose prior to admission: 2.5mg MF, 5mg all other days    Recent Labs     11/29/23  0918 11/30/23  0330   INR 1.74* 1.70*     Recent Labs     11/29/23  0900 11/30/23  0330   HGB 10.6* 9.8*    170   Concurrent anticoagulants/antiplatelets:  Brilinta   Significant warfarin drug-drug interactions:  multivitamin     Date INR Warfarin Dose   11/29/2023 1.74 5 mg    11/30/23 1.7  6 mg                                                Monitoring:                   INR will be monitored daily until therapeutic INR is achieved. **Please contact pharmacy for discharge instructions when indicated**    Lola ROSAS.Juan A., BCPS. , 11/30/2023,11:42 AM

## 2023-11-30 NOTE — PLAN OF CARE
Problem: Discharge Planning  Goal: Discharge to home or other facility with appropriate resources  Outcome: Progressing  Flowsheets (Taken 11/30/2023 0555)  Discharge to home or other facility with appropriate resources: Identify barriers to discharge with patient and caregiver     Problem: Pain  Goal: Verbalizes/displays adequate comfort level or baseline comfort level  Outcome: Progressing  Flowsheets (Taken 11/30/2023 0555)  Verbalizes/displays adequate comfort level or baseline comfort level:   Encourage patient to monitor pain and request assistance   Assess pain using appropriate pain scale     Problem: Safety - Adult  Goal: Free from fall injury  Outcome: Progressing  Flowsheets (Taken 11/30/2023 0555)  Free From Fall Injury: Instruct family/caregiver on patient safety     Problem: ABCDS Injury Assessment  Goal: Absence of physical injury  Outcome: Progressing  Flowsheets (Taken 11/30/2023 0555)  Absence of Physical Injury: Implement safety measures based on patient assessment     Problem: Neurosensory - Adult  Goal: Achieves stable or improved neurological status  Outcome: Progressing  Flowsheets (Taken 11/30/2023 0555)  Achieves stable or improved neurological status: Assess for and report changes in neurological status     Problem: Cardiovascular - Adult  Goal: Absence of cardiac dysrhythmias or at baseline  Outcome: Progressing  Flowsheets (Taken 11/30/2023 0555)  Absence of cardiac dysrhythmias or at baseline: Monitor cardiac rate and rhythm     Care plan reviewed with patient. Patient verbalizes understanding of the care plan and contributed to goal setting.

## 2023-11-30 NOTE — CARE COORDINATION
Case Management Assessment  Initial Evaluation    Date/Time of Evaluation: 11/30/2023 12:01 PM  Assessment Completed by: Ashley Horner RN    If patient is discharged prior to next notation, then this note serves as note for discharge by case management. Patient Name: Susie Farrell                   YOB: 1965  Diagnosis: Alcohol abuse [F10.10]  Dilated cardiomyopathy ([address]) [I42.0]  V tach ([address]) [I47.20]  Permanent atrial fibrillation ([address]) [I48.21]                   Date / Time: 11/29/2023  8:26 AM  Location: 3B-36/036-A     Patient Admission Status: Inpatient   Readmission Risk Low 0-14, Mod 15-19), High > 20: Readmission Risk Score: 25    Current PCP: ISABELLA Perez CNP  PCP verified by CM? Yes    Chart Reviewed: Yes      History Provided by: Patient  Patient Orientation: Alert and Oriented    Patient Cognition: Alert    Hospitalization in the last 30 days (Readmission):  No    If yes, Readmission Assessment in CM Navigator will be completed. Advance Directives:      Code Status: Full Code   Patient's Primary Decision Maker is: Named in Shweta Valdez     Primary Decision Maker: Molly Flores [employer]) - Child - [phone]    Secondary Decision Maker: Jackie Duke Child - [phone]    Discharge Planning:    Patient lives with: Alone Type of Home: House ([facility])  Primary Care Giver: Self  Patient Support Systems include: Children   Current Financial resources: Other (Comment) (BCBS commercial)  Current community resources: None (Has done AA in the past)  Current services prior to admission: None            Current DME:              Type of Home Care services:  None    ADLS  Prior functional level: Independent in ADLs/IADLs  Current functional level: Independent in ADLs/IADLs    Family can provide assistance at DC: Yes  Would you like Case Management to discuss the discharge plan with any other family members/significant others, and if so, who?  No  Plans to Return to Present Housing: Yes  Other Identified Issues/Barriers to RETURNING to current housing: none  Potential Assistance needed at discharge: N/A            Potential DME:    Patient expects to discharge to: House ([facility])  Plan for transportation at discharge: Family    Financial    Payor: Mariah Rosenthal / Plan: Yonathan Perera PPO / Product Type: *No Product type* /     Does insurance require precert for SNF: Yes    Potential assistance Purchasing Medications: No  Meds-to-Beds request: Yes      Jin Mc [id number] [facility], [facility] [address] [place] [id number]  Phone: [phone] Fax: [phone]      Notes:    Factors facilitating achievement of predicted outcomes: Family support, Cooperative, and Knowledge about rehab    Barriers to discharge: Medication managment and Cardiac work up, alcohol detox    Additional Case Management Notes: Admitted through ED with abdominal pain. Pt reportedly drinks a bottle of whiskey daily. Addiction Services consulted. Troponins 300-338. BUN 39 and 38. Creatinine 3.1 and 2.8. Magnesium 2.5. UA: glucose 500, moderate blood, protein >= 300. Was in afib, rate up to 155. Started on Heparin gtt and Amio gtt. Rate today is down to 78 and converted to NSR. Palliative Care consulted. Cardiology consulted. Procedure:   11/29 CT abd/pelvis:   1. Cholelithiasis. Nonspecific gallbladder distention. No biliary ductal dilatation or pericholecystic inflammation is observed. Correlate with liver function tests. 2. Normal appendix. No bowel obstruction. 3. Mild nonspecific distention of the urinary bladder. Correlate with urinalysis. No obstructive uropathy is visualized. Chronic findings. 11/29 US GB:   1. Cholelithiasis. 2. The common bile duct is not dilated. 11/29 ECHO: (has ICD)    Left Ventricle: Severely reduced left ventricular systolic function with a visually estimated EF of 30 - 35%. Left ventricle is severely dilated. Normal wall thickness.

## 2023-11-30 NOTE — PROGRESS NOTES
Presented to pt room for completion of AOD consult. Pt with RN, RN requests MAAME re-attempt at later time.

## 2023-11-30 NOTE — PROGRESS NOTES
46 Pt arrived to MRI for MRI of the brain with pacemaker. 1230 Exam explained using teach back method. Pt states understanding. 1233 Call placed to [employer] pacemaker rep An by MRI tech Jcarlos Saucedo to switch patient to MRI compatible mode. 1248 Patient assisted to table in supine position. Monitor attached to patient. Comfort ensured. 1250 Exam begins. 1308 Exam complete. 1311 Monitor removed. Patient assisted to wheelchair. Comfort ensured. 1312 Call placed to [employer] pacemaker rep An by MRI tech Jcarlos Saucedo to switch patient to pacemaker permanent mode. 1314 Patient transported to  in stable condition.

## 2023-11-30 NOTE — PALLIATIVE CARE
Initial Evaluation        Patient:   Susie Silverio  YOB: 1965  Age:  62 y.o. Room:  [address] [place] [address]  MRN:  [id number]   Acct: [de-identified]    Date of Admission:  11/29/2023  8:26 AM  Date of Service:  11/30/2023  Completed By:  Angela Tom RN                 Reason for Palliative Care Evaluation:-               [] Code Status Discussion              [x] Goals of Care - noncompliance              [] Pain/Symptom Management               [] Emotional Support              [] Other:                    Current Issues:-   []  Pain  [x]  Fatigue  []  Nausea  []  Anxiety  []  Depression  []  Shortness of Breath  []  Constipation  []  Appetite  [x]  Other: Alcohol withdrawal              Advance Directives:-    [] [address] DNR Form  [x] Living Will  [x] Medical POA              Current Code Status:-     [x] Full Resuscitation  [] DNR-Comfort Care-Arrest  [] DNR-Comfort Care       [] Limited Resuscitation             [] No CPR            [] No shock            [] No ET intubation/reintubation            [] No resuscitative medications            [] Other limitation:               Palliative Performance Status:-      [] 100%  Full ambulation; normal activity and work; no evidence of disease; able to do own self care; normal intake; fully conscious     [] 90%   Full ambulation; normal activity and work; some evidence of disease; able to do own self care; normal intake; fully conscious    [x] 80%   Full ambulation; normal activity with effort; some evidence of disease; able to do own self care; normal or reduced intake; fully conscious    [] 70%  Ambulation reduced; unable to perform normal job/work; significant  disease; able to do own self care; normal or reduced intake; fully conscious    [] 60%  Ambulation reduced; Significant disease;Can't do hobbies/housework; intake normal or reduced; occasional assist; LOC full/confusion    [] 50%  Mainly sit/lie;  Extensive disease; Can't do any work; Considerable assist; intake normal or reduced; LOC full/confusion    [] 40%  Mainly in bed; Extensive disease; Mainly assist; intake normal or reduced; LOC full/confusion     [] 30%  Bed Bound; Extensive disease; Total care; intake reduced; LOC full/confusion    [] 20%  Bed Bound; Extensive disease; Total care; intake minimal; Drowsy/coma    [] 10%  Bed Bound; Extensive disease; Total care; Mouth care only; Drowsy/coma               Goals of care evaluation:-          Goals of care discussed with:  [] Patient independently  [] Patient and Family  [] Family or Healthcare DPOA independently  [x] Unable to discuss with patient, family/DPOA not present      History:-    Received consult to discuss goals of care with patient. Per chart review patient into ED due to abd pain, intoxicated in ED. Patient reports drinking 8 bottles of [place] Apple in the last week. Patient spoke with this Palliative RN in August regarding being non compliant with meds. Patient at that time discussed having sponsors he planned to reach out to. On admission patient with nonsustained vtach. Cardiology consulted, no interventions at this time. Family/Patient Discussion:-    In room to speak with patient. Patient resting in chair with eyes closed. Stated patient's name x1, no response. Did not further disturb patient. Plan/Follow-Up:-    Will attempt to speak with patient later today as time allows.             Electronically signed by Román Luque RN on 11/30/2023 at 12:29 PM           Palliative Care Office: [phone]

## 2023-11-30 NOTE — PROGRESS NOTES
Internal Medicine  Resident Progress Note    Patient:  Dory Hodges, 62 y.o. male  : 1965  Unit: 3B-36/036-A  MRN:  [id number]     Acct:  [de-identified]    PCP:  Joseph Ruiz, [address] [place] [address] Drive Day:  1  Date of Admission:  2023    Date of Service:  23      ASSESSMENT / PLAN:   Abdominal pain. Suspect secondary to alcoholic gastritis. Low suspicion for PUD, GERD, ACS. Hemoglobin stable around baseline. CT A/P  cholelithiasis, no acute finding. S/p Protonix gtt. Of note, per ED triage note patient endorsed episode dark red vomit  morning. Patient also endorsed coughing up blood, one-time. Will monitor hemoglobin-appears at baseline . Protonix 40 mg p.o. twice daily  Resume diet  Monitor CBC  CAD status post PCI, CABG. Presents with various arrhythmias. S/p sustained V. tach in ED, several episodes NSVT, bigeminy. Status post [employer] ICD. Troponin is chronically elevated, 388 initially then decreased. Telemetry  Cardiology consulted  Paroxysmal atrial fibrillation. Many previous EKGs showing sinus rhythm. IGD9NT5-OROd score 4. On warfarin, noncompliant. INR 1.74, subtherapeutic. Status post heparin GTT. Hold mexiletine  Resume metoprolol  Warfarin, pharmacy to dose  HFrEF EF 30% severely reduced LVEF, LV severely dilated, severe global hypokinesis. Abnormal diastolic function. Echo 2023. Patient with several month history of increasing swelling, physical exam shows 3+ bilateral pitting edema lower extremity. Resume metoprolol  Hold Bumex  IV furosemide 20 mg twice daily  Strict I's and O's  CKD stage IV. Creatinine 2.8, approximately baseline. Monitor BMP  Continue diuresis  Type 2 diabetes mellitus. A1c 6.9 2023. Low-dose insulin sliding scale. Alcohol abuse. Patient with 1 whiskey bottle per day, at least 2 weeks. Intoxicated in ED. Ativan, CIWA protocol  Thiamine  Chronic medical noncompliance.   Many admissions with noted

## 2023-12-01 VITALS
HEIGHT: 74 IN | WEIGHT: 285 LBS | OXYGEN SATURATION: 96 % | HEART RATE: 83 BPM | TEMPERATURE: 99 F | BODY MASS INDEX: 36.57 KG/M2 | RESPIRATION RATE: 18 BRPM | DIASTOLIC BLOOD PRESSURE: 91 MMHG | SYSTOLIC BLOOD PRESSURE: 160 MMHG

## 2023-12-01 LAB
ANION GAP SERPL CALC-SCNC: 14 MEQ/L (ref 8–16)
BUN SERPL-MCNC: 37 MG/DL (ref 7–22)
CALCIUM SERPL-MCNC: 8.4 MG/DL (ref 8.5–10.5)
CHLORIDE SERPL-SCNC: 107 MEQ/L (ref 98–111)
CO2 SERPL-SCNC: 20 MEQ/L (ref 23–33)
CREAT SERPL-MCNC: 3.1 MG/DL (ref 0.4–1.2)
DEPRECATED RDW RBC AUTO: 59.4 FL (ref 35–45)
ERYTHROCYTE [DISTWIDTH] IN BLOOD BY AUTOMATED COUNT: 15.2 % (ref 11.5–14.5)
GFR SERPL CREATININE-BSD FRML MDRD: 22 ML/MIN/1.73M2
GLUCOSE BLD STRIP.AUTO-MCNC: 298 MG/DL (ref 70–108)
GLUCOSE SERPL-MCNC: 172 MG/DL (ref 70–108)
HCT VFR BLD AUTO: 35 % (ref 42–52)
HGB BLD-MCNC: 10.2 GM/DL (ref 14–18)
INR PPP: 2.44 (ref 0.85–1.13)
MAGNESIUM SERPL-MCNC: 2.1 MG/DL (ref 1.6–2.4)
MCH RBC QN AUTO: 31.1 PG (ref 26–33)
MCHC RBC AUTO-ENTMCNC: 29.1 GM/DL (ref 32.2–35.5)
MCV RBC AUTO: 106.7 FL (ref 80–94)
PLATELET # BLD AUTO: 149 THOU/MM3 (ref 130–400)
PMV BLD AUTO: 10.2 FL (ref 9.4–12.4)
POTASSIUM SERPL-SCNC: 3.9 MEQ/L (ref 3.5–5.2)
RBC # BLD AUTO: 3.28 MILL/MM3 (ref 4.7–6.1)
SODIUM SERPL-SCNC: 141 MEQ/L (ref 135–145)
WBC # BLD AUTO: 8.6 THOU/MM3 (ref 4.8–10.8)

## 2023-12-01 PROCEDURE — 80048 BASIC METABOLIC PNL TOTAL CA: CPT

## 2023-12-01 PROCEDURE — 82948 REAGENT STRIP/BLOOD GLUCOSE: CPT

## 2023-12-01 PROCEDURE — 6370000000 HC RX 637 (ALT 250 FOR IP)

## 2023-12-01 PROCEDURE — 99239 HOSP IP/OBS DSCHRG MGMT >30: CPT | Performed by: HOSPITALIST

## 2023-12-01 PROCEDURE — 6360000002 HC RX W HCPCS

## 2023-12-01 PROCEDURE — 83735 ASSAY OF MAGNESIUM: CPT

## 2023-12-01 PROCEDURE — 6370000000 HC RX 637 (ALT 250 FOR IP): Performed by: STUDENT IN AN ORGANIZED HEALTH CARE EDUCATION/TRAINING PROGRAM

## 2023-12-01 PROCEDURE — 85027 COMPLETE CBC AUTOMATED: CPT

## 2023-12-01 PROCEDURE — 6360000002 HC RX W HCPCS: Performed by: STUDENT IN AN ORGANIZED HEALTH CARE EDUCATION/TRAINING PROGRAM

## 2023-12-01 PROCEDURE — 85610 PROTHROMBIN TIME: CPT

## 2023-12-01 PROCEDURE — 2580000003 HC RX 258: Performed by: STUDENT IN AN ORGANIZED HEALTH CARE EDUCATION/TRAINING PROGRAM

## 2023-12-01 PROCEDURE — 36415 COLL VENOUS BLD VENIPUNCTURE: CPT

## 2023-12-01 RX ADMIN — POTASSIUM CHLORIDE 20 MEQ: 1500 TABLET, EXTENDED RELEASE ORAL at 08:16

## 2023-12-01 RX ADMIN — ISOSORBIDE MONONITRATE 120 MG: 60 TABLET, EXTENDED RELEASE ORAL at 08:16

## 2023-12-01 RX ADMIN — PANTOPRAZOLE SODIUM 40 MG: 40 TABLET, DELAYED RELEASE ORAL at 03:38

## 2023-12-01 RX ADMIN — Medication 1 TABLET: at 08:16

## 2023-12-01 RX ADMIN — THIAMINE HYDROCHLORIDE 100 MG: 100 INJECTION, SOLUTION INTRAMUSCULAR; INTRAVENOUS at 08:16

## 2023-12-01 RX ADMIN — INSULIN LISPRO 2 UNITS: 100 INJECTION, SOLUTION INTRAVENOUS; SUBCUTANEOUS at 08:23

## 2023-12-01 RX ADMIN — FUROSEMIDE 20 MG: 10 INJECTION, SOLUTION INTRAMUSCULAR; INTRAVENOUS at 08:16

## 2023-12-01 RX ADMIN — SODIUM CHLORIDE, PRESERVATIVE FREE 10 ML: 5 INJECTION INTRAVENOUS at 08:15

## 2023-12-01 RX ADMIN — LORAZEPAM 2 MG: 2 TABLET ORAL at 05:30

## 2023-12-01 RX ADMIN — LORAZEPAM 1 MG: 1 TABLET ORAL at 08:16

## 2023-12-01 RX ADMIN — TICAGRELOR 90 MG: 90 TABLET ORAL at 08:16

## 2023-12-01 RX ADMIN — METOPROLOL SUCCINATE 100 MG: 100 TABLET, EXTENDED RELEASE ORAL at 08:16

## 2023-12-01 NOTE — PLAN OF CARE
Problem: Discharge Planning  Goal: Discharge to home or other facility with appropriate resources  Outcome: Progressing  Flowsheets (Taken 11/30/2023 0555 by Black Anaya, RN)  Discharge to home or other facility with appropriate resources: Identify barriers to discharge with patient and caregiver     Problem: Pain  Goal: Verbalizes/displays adequate comfort level or baseline comfort level  Outcome: Progressing  Flowsheets (Taken 11/30/2023 0555 by Black Anaya, RN)  Verbalizes/displays adequate comfort level or baseline comfort level:   Encourage patient to monitor pain and request assistance   Assess pain using appropriate pain scale     Problem: Safety - Adult  Goal: Free from fall injury  Outcome: Progressing  Flowsheets (Taken 11/30/2023 0555 by Black Anaya RN)  Free From Fall Injury: Instruct family/caregiver on patient safety     Problem: ABCDS Injury Assessment  Goal: Absence of physical injury  Outcome: Progressing  Flowsheets (Taken 11/30/2023 0555 by Black Anaya RN)  Absence of Physical Injury: Implement safety measures based on patient assessment     Problem: Neurosensory - Adult  Goal: Achieves stable or improved neurological status  Outcome: Progressing  Flowsheets (Taken 11/30/2023 0555 by Black Anaya RN)  Achieves stable or improved neurological status: Assess for and report changes in neurological status     Problem: Cardiovascular - Adult  Goal: Absence of cardiac dysrhythmias or at baseline  Outcome: Progressing  Flowsheets (Taken 11/30/2023 0555 by Black Anaya RN)  Absence of cardiac dysrhythmias or at baseline: Monitor cardiac rate and rhythm     Problem: Chronic Conditions and Co-morbidities  Goal: Patient's chronic conditions and co-morbidity symptoms are monitored and maintained or improved  Outcome: Progressing  Flowsheets (Taken 11/30/2023 2047)  Care Plan - Patient's Chronic Conditions and Co-Morbidity Symptoms are Monitored and

## 2023-12-01 NOTE — DISCHARGE SUMMARY
DISCHARGE SUMMARY NOTE    Patient - Dory Hodges   MRN -  [id number]   [facility]   - 1965      Date of Admission -  2023  8:26 AM  Date of Discharge-  2023  Length of Stay - 2 days  Code Status:  Prior  Attending Physician: Dr. Ken Balderas MD  Primary Care Physician - Joseph Ruiz, APRN - CNP     Chief Complaint:   Abdominal Pain     Discharge Diagnosis:   Non-sustained V-Tach  Alcohol Withdrawal   Alcoholic Gastritis  Chronic Pancreatitis   Medical Noncompliance   CAD S/p PCI, CABG  Mixed ICM, NICM, HFrEF 30% - Noncompliant on GDMT  Paroxysmal Atrial Fibrillation S/p Ablation (CV 4, noncompliant with coumadin)  Chronically Elevated Troponins   Chronic Alcohol Abuse   Type II DM  CKD IV  Obesity     Consultations:   IP CONSULT TO SOCIAL WORK  IP CONSULT TO ADDICTION SERVICES  IP CONSULT TO CARDIOLOGY  IP CONSULT TO PHARMACY  IP CONSULT TO PALLIATIVE CARE    HPI:   \"Garrett Cat is a 62 y.o., , male who  has a past medical history of Acute systolic CHF (congestive heart failure) ([address]), Alcohol abuse, Anomalous origin of right coronary artery, CAD (coronary artery disease), Chronic kidney disease, Diabetes mellitus ([address]), Drug abuse ([address]), GERD (gastroesophageal reflux disease), Hypertension, Intradural extramedullary spinal tumor, Medtronic dual icd , Neuromuscular disorder ([address]), Paroxysmal atrial fibrillation ([address]), Severe obesity (BMI 35.0-39. 9) with comorbidity ([address]), and V-tach ([address] that is hospitalized for V tach and abdominal pain. Reported not taking any medication for 1 week. He has been drinking 1 bottle of whisky daily last 17 days with last drink yesterday. Pateint reported diffuse abdominal pain for 1 day. Denied any diarrhea, comstipation but also reported no Bms for few days. Report no oral intake last 7 days except alcohol. Denied any fevers, chills, SOB, chest pain, chest pressure, recent GI illness, respiratory symptom.  Denied vomiting blood, cough up blood, UTI symptoms. Live at home alone. No tobacco use. \"    Brief Hospital Course:   Admitted for reported single episode of sustained VT in ED and abdominal pain believed to be alcoholic gastritis, very noncompliant at home. Review of telemetry reveals non-sustained, with many PVCs, drinks 4/5ths liquor daily, has not taken any medications in over a week. Pt was evaluated by cardiology who recommended resumption of Mexelitine and GDMT. He was provided with alcoholic withdrawal prophylaxis during stay until symptoms resolved. No sustained VT was identified during stay. Abdominal pain self-limiting, resolved by day 2, and he insisted on leaving. Threatened to leave AMA. Given his noncompliance, pt was informed of risks to his health and stated he was aware. Was not a candidate for intervention or LifeVest. He has no plans of quitting alcohol anytime soon. He was discharged on 12/01/23 in stable medical condition. Though he remains high risk for readmission. Physical Exam   General appearance: Alert [place], lethargic-appearing male. Cooperative. NAD. HEENT:  Normocephalic / atraumatic. PERRL. EOM intact. Conjunctivae appear normal.  Neck: Supple. No JVD. Respiratory: Normal respiratory effort on RA. CTAB. No wheezes / rales / rhonchi. Cardiovascular: Regular rate, abnormal rhythm. Normal S1/S2. No murmurs / rubs / gallops. Abdomen: Soft / non-tender / non-distended. BS present. Musculoskeletal: No cyanosis. 1+ bilateral peripheral pitting edema  Neurologic: A/O x 3. Speech normal. No obvious focal neurologic deficits. Psychiatric: Thought content / judgment / insight appropriate. Capillary refill: Brisk bilaterally. Peripheral pulses: +2 bilaterally. Radiology      MRI BRAIN WO CONTRAST   Final Result       1. Mild atrophy and probable ischemic changes in the white matter. 2. No evidence for an acute infarct.    3. Inflammatory changes in the right frontal and to lesser extent right and left

## 2023-12-01 NOTE — PROGRESS NOTES
Physician spoke with patient regarding staying. Patient was insistent that he wanted to leave AMA. Paperwork provided to patient and he signed. IV x2 removed. Telemetry removed and left in room for cleaning. Patient given a clean gown and scrub bottoms to wear due to the clothing he was admitted in being soiled. Patient with cell phone, wallet and . Patient was escorted to the third floor elevators.

## 2023-12-01 NOTE — CARE COORDINATION
12/1/23, 1:00 PM EST    Patient goals/plan/ treatment preferences discussed by  and . Patient goals/plan/ treatment preferences reviewed with patient/ family. Patient/ family verbalize understanding of discharge plan and are in agreement with goal/plan/treatment preferences. Understanding was demonstrated using the teach back method. AVS provided by RN at time of discharge, which includes all necessary medical information pertaining to the patients current course of illness, treatment, post-discharge goals of care, and treatment preferences. Services At/After Discharge: None             Pt left AMA.      Electronically signed by Alisha Casas RN on 12/1/2023 at 1:00 PM

## 2023-12-01 NOTE — PROGRESS NOTES
Perfect serve to Dr. Chante Vinson to inform him patient's creatinine was 3.1 with morning labs and asking if 20mg lasix should/should not be given this a.m. Per Dr. Chante Vinson, give morning lasix dose.

## 2023-12-01 NOTE — PROGRESS NOTES
Patient indicated that he wants to leave AMA. Writer sent a perfect serve to Dr. Marilyn Sherman regarding the patient's intentions and the physician indicated he would stop by to speak with the patient.

## 2023-12-04 ENCOUNTER — CARE COORDINATION (OUTPATIENT)
Dept: CASE MANAGEMENT | Age: 58
End: 2023-12-04

## 2023-12-04 ENCOUNTER — TELEPHONE (OUTPATIENT)
Dept: FAMILY MEDICINE CLINIC | Age: 58
End: 2023-12-04

## 2023-12-04 DIAGNOSIS — I47.20 V TACH (HCC): Primary | ICD-10-CM

## 2023-12-04 NOTE — CARE COORDINATION
Care Transitions Initial Follow Up Call    Call within 2 business days of discharge: Yes    Patient Current Location:  Home: [address] [place] [address]    Care Transition Nurse contacted the patient by telephone to perform post hospital discharge assessment. Verified name and  with patient as identifiers. Provided introduction to self, and explanation of the Care Transition Nurse role. Patient: Susie Silverio Patient : 1965   MRN: [id number]  Reason for Admission: abdominal pain, alcohol intoxication, palpitations, V tach  Discharge Date: 23 RARS: Readmission Risk Score: 24.5      Last Discharge Facility       Date Complaint Diagnosis Description Type Department Provider    23 Alcohol Intoxication; Abdominal Pain V tach ([facility]) . .. ED to Hosp-Admission (Discharged) (ADMITTED) [facility] 3[facility] Adora Jeans, MD; Mayra Guerrero. .. Was this an external facility discharge? No Discharge Facility: [address] [place] [address] to be reviewed by the provider   Additional needs identified to be addressed with provider: No  none               Method of communication with provider: none. Contacted pt for initial care transition follow up. Lucy Hercules states he is doing alright. Denies having any c/o chest pain/discomfort, palpitations, elevated or rapid HR, shortness of breath, swelling. No longer having any abdominal pain. Pt had questions regarding MRI and CT scan. Reviewed results-  MRI BRAIN WO CONTRAST   Final Result       1. Mild atrophy and probable ischemic changes in the white matter. 2. No evidence for an acute infarct. 3. Inflammatory changes in the right frontal and to lesser extent right and left maxillary sinuses and mastoid air cells bilaterally. 4. Slightly increased signal intensity in the nasopharynx which may represent enlarged adenoids or a small Tornwaldt cyst.      CT ABDOMEN PELVIS WO CONTRAST Additional Contrast? None   Final Result   1. Cholelithiasis.  Nonspecific

## 2023-12-04 NOTE — TELEPHONE ENCOUNTER
Care Transitions Initial Follow Up Call    Outreach made within 2 business days of discharge: Yes    Patient: Yandy Miguel Patient : 1965   MRN: [id number]  Reason for Admission: There are no discharge diagnoses documented for the most recent discharge. Discharge Date: 23       Spoke with: Patient    Discharge department/facility: [facility]    TCM Interactive Patient Contact:  Was patient able to fill all prescriptions: Yes  Was patient instructed to bring all medications to the follow-up visit: Yes  Is patient taking all medications as directed in the discharge summary?  Yes  Does patient understand their discharge instructions: Yes  Does patient have questions or concerns that need addressed prior to 7-14 day follow up office visit: yes - FMLA    Scheduled appointment with PCP within 7-14 days    Follow Up  Future Appointments   Date Time Provider [address] [place] [address]   2023  1:30 PM Genet Adorno [address]   2023  3:30 PM Genet Adorno [address]   2024  3:30 PM SCHEDULE, [facility] PACER NURSE N [facility] PACER [facility]   2024  3:30 PM Cameron Johnson MD N [address],  [address]   2024  1:00 PM Katya Mcclellan MD N [facility] [address]  [facility] [address], [facility] [facility])

## 2023-12-04 NOTE — TELEPHONE ENCOUNTER
Spoke with patient and he stated that [employer] is faxing paperwork for his time off work. He stated that he was to go back to work on 11/25/23 however he started to not feel well. He stated that he hasn't been back to work since. He ended up going to the hospital and was admitted. He stated that he was coughing up slight blood. And when he was there he was given ativan and it caused him to hallucinate. When he left the hospital he still felt funny from the medication. He is scheduled for a follow up 12/11/23. He wanted to know if you would cover his FMLA from 11/25/23-12/11/23. He thinks he should be able to go back to work on Thursday.        Patient would like a call once we receive the [employer] paperwork

## 2023-12-06 ENCOUNTER — OFFICE VISIT (OUTPATIENT)
Dept: FAMILY MEDICINE CLINIC | Age: 58
End: 2023-12-06

## 2023-12-06 VITALS
WEIGHT: 262.4 LBS | BODY MASS INDEX: 33.69 KG/M2 | DIASTOLIC BLOOD PRESSURE: 54 MMHG | SYSTOLIC BLOOD PRESSURE: 108 MMHG | HEART RATE: 72 BPM | RESPIRATION RATE: 16 BRPM

## 2023-12-06 DIAGNOSIS — E11.22 CONTROLLED TYPE 2 DIABETES MELLITUS WITH CHRONIC KIDNEY DISEASE, WITHOUT LONG-TERM CURRENT USE OF INSULIN, UNSPECIFIED CKD STAGE (HCC): ICD-10-CM

## 2023-12-06 DIAGNOSIS — I25.709 CORONARY ARTERY DISEASE INVOLVING CORONARY BYPASS GRAFT OF NATIVE HEART WITH ANGINA PECTORIS (HCC): ICD-10-CM

## 2023-12-06 DIAGNOSIS — F10.10 ALCOHOL ABUSE: ICD-10-CM

## 2023-12-06 DIAGNOSIS — I50.22 CHRONIC SYSTOLIC CONGESTIVE HEART FAILURE (HCC): ICD-10-CM

## 2023-12-06 DIAGNOSIS — I47.20 V TACH (HCC): ICD-10-CM

## 2023-12-06 DIAGNOSIS — F10.231 ALCOHOL DEPENDENCE WITH WITHDRAWAL DELIRIUM (HCC): ICD-10-CM

## 2023-12-06 DIAGNOSIS — Z09 HOSPITAL DISCHARGE FOLLOW-UP: Primary | ICD-10-CM

## 2023-12-06 DIAGNOSIS — N18.4 CKD (CHRONIC KIDNEY DISEASE) STAGE 4, GFR 15-29 ML/MIN (HCC): ICD-10-CM

## 2023-12-06 DIAGNOSIS — E78.2 MIXED HYPERLIPIDEMIA: ICD-10-CM

## 2023-12-06 DIAGNOSIS — Z95.810 ICD (IMPLANTABLE CARDIOVERTER-DEFIBRILLATOR) IN PLACE: ICD-10-CM

## 2023-12-06 DIAGNOSIS — I10 ESSENTIAL HYPERTENSION: ICD-10-CM

## 2023-12-06 DIAGNOSIS — I42.0 DILATED CARDIOMYOPATHY (HCC): ICD-10-CM

## 2023-12-06 DIAGNOSIS — F41.3 OTHER MIXED ANXIETY DISORDERS: ICD-10-CM

## 2023-12-06 DIAGNOSIS — K29.20 ACUTE ALCOHOLIC GASTRITIS WITHOUT HEMORRHAGE: ICD-10-CM

## 2023-12-06 ASSESSMENT — ENCOUNTER SYMPTOMS
ABDOMINAL PAIN: 0
COUGH: 0
NAUSEA: 0
SHORTNESS OF BREATH: 0

## 2023-12-06 NOTE — PROGRESS NOTES
Patient - Ori Baird   MRN -  [id number]   [facility]   - 1965      Date of Admission -  2023  8:26 AM  Date of Discharge-  2023  Length of Stay - 2 days  Code Status:  Prior  Attending Physician: Dr. Benjamin La MD  Primary Care Physician - Dina Goss, APRN - CNP      Chief Complaint:   Abdominal Pain      Discharge Diagnosis:   Non-sustained V-Tach  Alcohol Withdrawal   Alcoholic Gastritis  Chronic Pancreatitis   Medical Noncompliance   CAD S/p PCI, CABG  Mixed ICM, NICM, HFrEF 30% - Noncompliant on GDMT  Paroxysmal Atrial Fibrillation S/p Ablation (CV 4, noncompliant with coumadin)  Chronically Elevated Troponins   Chronic Alcohol Abuse   Type II DM  CKD IV  Obesity      Consultations:   IP CONSULT TO SOCIAL WORK  IP CONSULT TO ADDICTION SERVICES  IP CONSULT TO CARDIOLOGY  IP CONSULT TO PHARMACY  IP CONSULT TO PALLIATIVE CARE     HPI:   \"Garrett Lopez is a 62 y.o., , male who  has a past medical history of Acute systolic CHF (congestive heart failure) ([address]), Alcohol abuse, Anomalous origin of right coronary artery, CAD (coronary artery disease), Chronic kidney disease, Diabetes mellitus ([address]), Drug abuse ([address]), GERD (gastroesophageal reflux disease), Hypertension, Intradural extramedullary spinal tumor, Medtronic dual icd , Neuromuscular disorder ([address]), Paroxysmal atrial fibrillation ([address]), Severe obesity (BMI 35.0-39. 9) with comorbidity ([address]), and V-tach ([address] that is hospitalized for V tach and abdominal pain. Reported not taking any medication for 1 week. He has been drinking 1 bottle of whisky daily last 17 days with last drink yesterday. Pateint reported diffuse abdominal pain for 1 day. Denied any diarrhea, comstipation but also reported no Bms for few days. Report no oral intake last 7 days except alcohol. Denied any fevers, chills, SOB, chest pain, chest pressure, recent GI illness, respiratory symptom.  Denied vomiting blood, cough up blood, UTI

## 2023-12-08 ENCOUNTER — CARE COORDINATION (OUTPATIENT)
Dept: CASE MANAGEMENT | Age: 58
End: 2023-12-08

## 2023-12-08 NOTE — CARE COORDINATION
Care Transitions Follow Up Call    Patient Current Location:  Home: [phone]    Care Transition Nurse contacted the patient by telephone to follow up after admission on 23. Verified name and  with patient as identifiers. Patient: Yandy Miguel  Patient : 1965   MRN: [id number]  Reason for Admission: abdominal pain, alcohol intoxication, palpitations, V tach  Discharge Date: 23 RARS: Readmission Risk Score: 24.5      Needs to be reviewed by the provider   Additional needs identified to be addressed with provider: No  none             Method of communication with provider: none. Contacted pt for care transition follow up. Hellen Ngo states he is doing fine. Denies having any c/o chest pain/discomfort, palpitations, elevated or rapid HR. Per pt, not really having any abdominal pain. Pain may come and go. Denies nausea/vomiting. Reports neck pain is still present. Pain has not worsened. States it was addressed but no changes to any medications. He is not taking any medication for the pain. Pt will continue to monitor. We discussed possible referral to specialist if symptoms worsen and advised to contact provider to discuss. Denies headaches, dizziness/lightheadedness. Pt plans to return to work on Monday, 23. Pt okay with follow up calls. If he does not answer, leave a message and he will call back. No questions or needs at this time. Addressed changes since last contact:  none  Discussed follow-up appointments. If no appointment was previously scheduled, appointment scheduling offered: Yes. Is follow up appointment scheduled within 7 days of discharge? Yes.     Follow Up  Future Appointments   Date Time Provider [address] [place] [address]   2024  3:30 PM SANDRA Gavin PACER NURSE N [facility] PACER [facility]   2024  3:30 PM Cameron Johnson MD N [address] [facility], [address]   2024  1:00 PM Katya Mcclelaln MD N [facility] Heart [facility]   2024

## 2023-12-11 ENCOUNTER — TELEPHONE (OUTPATIENT)
Dept: FAMILY MEDICINE CLINIC | Age: 58
End: 2023-12-11

## 2023-12-11 NOTE — TELEPHONE ENCOUNTER
The patient called and stated that he went back to work today after being office for about 2 weeks. He turned in the letter that he received from the office and his employer is requesting it be written with the DX on it. Please advise. Call the patient when the letter is ready for . detailed exam details…

## 2023-12-14 ENCOUNTER — CARE COORDINATION (OUTPATIENT)
Dept: CASE MANAGEMENT | Age: 58
End: 2023-12-14

## 2023-12-14 NOTE — CARE COORDINATION
factors.   Plan for next call: symptom management-any new or worsening symptoms    Michelle Portillo RN

## 2023-12-29 ENCOUNTER — CARE COORDINATION (OUTPATIENT)
Dept: CASE MANAGEMENT | Age: 58
End: 2023-12-29

## 2024-01-02 ENCOUNTER — APPOINTMENT (OUTPATIENT)
Dept: GENERAL RADIOLOGY | Age: 59
DRG: 309 | End: 2024-01-02
Payer: COMMERCIAL

## 2024-01-02 ENCOUNTER — ANESTHESIA (OUTPATIENT)
Dept: INPATIENT UNIT | Age: 59
DRG: 309 | End: 2024-01-02
Payer: COMMERCIAL

## 2024-01-02 ENCOUNTER — APPOINTMENT (OUTPATIENT)
Age: 59
DRG: 309 | End: 2024-01-02
Payer: COMMERCIAL

## 2024-01-02 ENCOUNTER — HOSPITAL ENCOUNTER (INPATIENT)
Age: 59
LOS: 1 days | Discharge: HOME | DRG: 309 | End: 2024-01-05
Admitting: PHYSICIAN ASSISTANT
Payer: COMMERCIAL

## 2024-01-02 ENCOUNTER — ANESTHESIA EVENT (OUTPATIENT)
Dept: INPATIENT UNIT | Age: 59
DRG: 309 | End: 2024-01-02
Payer: COMMERCIAL

## 2024-01-02 DIAGNOSIS — Z91.148 NON COMPLIANCE W MEDICATION REGIMEN: ICD-10-CM

## 2024-01-02 DIAGNOSIS — I47.29 OTHER VENTRICULAR TACHYCARDIA (HCC): Primary | ICD-10-CM

## 2024-01-02 DIAGNOSIS — I47.20 V TACH (HCC): ICD-10-CM

## 2024-01-02 DIAGNOSIS — N18.5 STAGE 5 CHRONIC KIDNEY DISEASE NOT ON CHRONIC DIALYSIS (HCC): ICD-10-CM

## 2024-01-02 DIAGNOSIS — F10.920 ACUTE ALCOHOLIC INTOXICATION WITHOUT COMPLICATION (HCC): ICD-10-CM

## 2024-01-02 DIAGNOSIS — S20.212A CONTUSION OF RIB ON LEFT SIDE, INITIAL ENCOUNTER: ICD-10-CM

## 2024-01-02 PROBLEM — I49.9 CARDIAC ARRHYTHMIA: Status: ACTIVE | Noted: 2024-01-02

## 2024-01-02 LAB
ALBUMIN SERPL BCG-MCNC: 3.9 G/DL (ref 3.5–5.1)
ALP SERPL-CCNC: 106 U/L (ref 38–126)
ALT SERPL W/O P-5'-P-CCNC: 30 U/L (ref 11–66)
AMMONIA PLAS-MCNC: 22 UMOL/L (ref 11–60)
AMORPH SED URNS QL MICRO: ABNORMAL
AMPHETAMINES UR QL SCN: NEGATIVE
ANION GAP SERPL CALC-SCNC: 17 MEQ/L (ref 8–16)
ANION GAP SERPL CALC-SCNC: 18 MEQ/L (ref 8–16)
APAP SERPL-MCNC: < 5 UG/ML (ref 0–20)
AST SERPL-CCNC: 45 U/L (ref 5–40)
B-OH-BUTYR SERPL-MSCNC: 1.86 MG/DL (ref 0.2–2.81)
BACTERIA: ABNORMAL
BARBITURATES UR QL SCN: NEGATIVE
BASOPHILS ABSOLUTE: 0.1 THOU/MM3 (ref 0–0.1)
BASOPHILS NFR BLD AUTO: 1.1 %
BENZODIAZ UR QL SCN: NEGATIVE
BILIRUB SERPL-MCNC: 0.3 MG/DL (ref 0.3–1.2)
BILIRUB UR QL STRIP: NEGATIVE
BUN SERPL-MCNC: 52 MG/DL (ref 7–22)
BUN SERPL-MCNC: 52 MG/DL (ref 7–22)
BZE UR QL SCN: NEGATIVE
CA-I BLD ISE-SCNC: 1.07 MMOL/L (ref 1.12–1.32)
CALCIUM SERPL-MCNC: 8.6 MG/DL (ref 8.5–10.5)
CALCIUM SERPL-MCNC: 9 MG/DL (ref 8.5–10.5)
CANNABINOIDS UR QL SCN: NEGATIVE
CASTS #/AREA URNS LPF: ABNORMAL /LPF
CASTS #/AREA URNS LPF: ABNORMAL /LPF
CHARACTER UR: ABNORMAL
CHARCOAL URNS QL MICRO: ABNORMAL
CHLORIDE SERPL-SCNC: 109 MEQ/L (ref 98–111)
CHLORIDE SERPL-SCNC: 111 MEQ/L (ref 98–111)
CO2 SERPL-SCNC: 14 MEQ/L (ref 23–33)
CO2 SERPL-SCNC: 16 MEQ/L (ref 23–33)
COLOR UR: YELLOW
CREAT SERPL-MCNC: 3 MG/DL (ref 0.4–1.2)
CREAT SERPL-MCNC: 3.1 MG/DL (ref 0.4–1.2)
CRYSTALS URNS QL MICRO: ABNORMAL
DEPRECATED RDW RBC AUTO: 52.7 FL (ref 35–45)
ECHO AV CUSP MM: 2.4 CM
ECHO BSA: 2.51 M2
ECHO LA AREA 2C: 24.5 CM2
ECHO LA AREA 4C: 25.2 CM2
ECHO LA MAJOR AXIS: 6.1 CM
ECHO LA MINOR AXIS: 5.8 CM
ECHO LA VOL BP: 87 ML (ref 18–58)
ECHO LA VOL MOD A2C: 86 ML (ref 18–58)
ECHO LA VOL MOD A4C: 84 ML (ref 18–58)
ECHO LA VOL/BSA BIPLANE: 36 ML/M2 (ref 16–34)
ECHO LA VOLUME INDEX MOD A2C: 35 ML/M2 (ref 16–34)
ECHO LA VOLUME INDEX MOD A4C: 34 ML/M2 (ref 16–34)
ECHO LV EDV A2C: 92 ML
ECHO LV EDV A4C: 177 ML
ECHO LV EDV INDEX A4C: 72 ML/M2
ECHO LV EDV NDEX A2C: 38 ML/M2
ECHO LV EJECTION FRACTION A2C: 34 %
ECHO LV EJECTION FRACTION A4C: 32 %
ECHO LV EJECTION FRACTION BIPLANE: 33 % (ref 55–100)
ECHO LV ESV A2C: 61 ML
ECHO LV ESV A4C: 121 ML
ECHO LV ESV INDEX A2C: 25 ML/M2
ECHO LV ESV INDEX A4C: 49 ML/M2
ECHO LV FRACTIONAL SHORTENING: 14 % (ref 28–44)
ECHO LV INTERNAL DIMENSION DIASTOLE INDEX: 2.61 CM/M2
ECHO LV INTERNAL DIMENSION DIASTOLIC: 6.4 CM (ref 4.2–5.9)
ECHO LV INTERNAL DIMENSION SYSTOLIC INDEX: 2.24 CM/M2
ECHO LV INTERNAL DIMENSION SYSTOLIC: 5.5 CM
ECHO LV IVSD: 1.1 CM (ref 0.6–1)
ECHO LV MASS 2D: 311.7 G (ref 88–224)
ECHO LV MASS INDEX 2D: 127.2 G/M2 (ref 49–115)
ECHO LV POSTERIOR WALL DIASTOLIC: 1.1 CM (ref 0.6–1)
ECHO LV RELATIVE WALL THICKNESS RATIO: 0.34
ECHO RV INTERNAL DIMENSION: 3.1 CM
EKG ATRIAL RATE: 120 BPM
EKG ATRIAL RATE: 50 BPM
EKG ATRIAL RATE: 88 BPM
EKG P AXIS: 60 DEGREES
EKG P AXIS: 80 DEGREES
EKG P-R INTERVAL: 182 MS
EKG P-R INTERVAL: 194 MS
EKG Q-T INTERVAL: 388 MS
EKG Q-T INTERVAL: 406 MS
EKG Q-T INTERVAL: 424 MS
EKG QRS DURATION: 174 MS
EKG QRS DURATION: 86 MS
EKG QRS DURATION: 96 MS
EKG QTC CALCULATION (BAZETT): 469 MS
EKG QTC CALCULATION (BAZETT): 493 MS
EKG QTC CALCULATION (BAZETT): 518 MS
EKG R AXIS: 13 DEGREES
EKG R AXIS: 13 DEGREES
EKG R AXIS: 21 DEGREES
EKG T AXIS: 132 DEGREES
EKG T AXIS: 83 DEGREES
EKG T AXIS: 95 DEGREES
EKG VENTRICULAR RATE: 88 BPM
EKG VENTRICULAR RATE: 89 BPM
EKG VENTRICULAR RATE: 90 BPM
EOSINOPHIL NFR BLD AUTO: 0.9 %
EOSINOPHILS ABSOLUTE: 0.1 THOU/MM3 (ref 0–0.4)
EPITHELIAL CELLS, UA: ABNORMAL /HPF
ERYTHROCYTE [DISTWIDTH] IN BLOOD BY AUTOMATED COUNT: 14.9 % (ref 11.5–14.5)
ETHANOL SERPL-MCNC: 0.34 %
FENTANYL: NEGATIVE
GFR SERPL CREATININE-BSD FRML MDRD: 22 ML/MIN/1.73M2
GFR SERPL CREATININE-BSD FRML MDRD: 23 ML/MIN/1.73M2
GLUCOSE BLD STRIP.AUTO-MCNC: 221 MG/DL (ref 70–108)
GLUCOSE BLD STRIP.AUTO-MCNC: 246 MG/DL (ref 70–108)
GLUCOSE BLD STRIP.AUTO-MCNC: 246 MG/DL (ref 70–108)
GLUCOSE SERPL-MCNC: 168 MG/DL (ref 70–108)
GLUCOSE SERPL-MCNC: 169 MG/DL (ref 70–108)
GLUCOSE UR QL STRIP.AUTO: 250 MG/DL
HCT VFR BLD AUTO: 35.1 % (ref 42–52)
HGB BLD-MCNC: 10.9 GM/DL (ref 14–18)
HGB UR QL STRIP.AUTO: ABNORMAL
IMM GRANULOCYTES # BLD AUTO: 0.04 THOU/MM3 (ref 0–0.07)
IMM GRANULOCYTES NFR BLD AUTO: 0.5 %
INR PPP: 4.01 (ref 0.85–1.13)
KETONES UR QL STRIP.AUTO: NEGATIVE
LEUKOCYTE ESTERASE UR QL STRIP.AUTO: ABNORMAL
LIPASE SERPL-CCNC: 106.1 U/L (ref 5.6–51.3)
LYMPHOCYTES ABSOLUTE: 1.3 THOU/MM3 (ref 1–4.8)
LYMPHOCYTES NFR BLD AUTO: 16.8 %
MAGNESIUM SERPL-MCNC: 2.8 MG/DL (ref 1.6–2.4)
MAGNESIUM SERPL-MCNC: 2.9 MG/DL (ref 1.6–2.4)
MCH RBC QN AUTO: 30.3 PG (ref 26–33)
MCHC RBC AUTO-ENTMCNC: 31.1 GM/DL (ref 32.2–35.5)
MCV RBC AUTO: 97.5 FL (ref 80–94)
MONOCYTES ABSOLUTE: 0.5 THOU/MM3 (ref 0.4–1.3)
MONOCYTES NFR BLD AUTO: 6.8 %
MUCOUS THREADS URNS QL MICRO: ABNORMAL
NEUTROPHILS NFR BLD AUTO: 73.9 %
NITRITE UR QL STRIP.AUTO: NEGATIVE
NRBC BLD AUTO-RTO: 0 /100 WBC
NT-PROBNP SERPL IA-MCNC: 8651 PG/ML (ref 0–124)
OPIATES UR QL SCN: POSITIVE
OSMOLALITY SERPL CALC.SUM OF ELEC: 301 MOSMOL/KG (ref 275–300)
OXYCODONE: POSITIVE
PCP UR QL SCN: NEGATIVE
PH UR STRIP.AUTO: 5 [PH] (ref 5–9)
PHOSPHATE SERPL-MCNC: 3.2 MG/DL (ref 2.4–4.7)
PLATELET # BLD AUTO: 197 THOU/MM3 (ref 130–400)
PMV BLD AUTO: 9.5 FL (ref 9.4–12.4)
POTASSIUM SERPL-SCNC: 4.3 MEQ/L (ref 3.5–5.2)
POTASSIUM SERPL-SCNC: 4.5 MEQ/L (ref 3.5–5.2)
PROT SERPL-MCNC: 6.9 G/DL (ref 6.1–8)
PROT UR STRIP.AUTO-MCNC: >= 300 MG/DL
RBC # BLD AUTO: 3.6 MILL/MM3 (ref 4.7–6.1)
RBC #/AREA URNS HPF: > 100 /HPF
RENAL EPI CELLS #/AREA URNS HPF: ABNORMAL /[HPF]
SALICYLATES SERPL-MCNC: < 0.3 MG/DL (ref 2–10)
SEGMENTED NEUTROPHILS ABSOLUTE COUNT: 5.8 THOU/MM3 (ref 1.8–7.7)
SODIUM SERPL-SCNC: 142 MEQ/L (ref 135–145)
SODIUM SERPL-SCNC: 143 MEQ/L (ref 135–145)
SPECIFIC GRAVITY UA: 1.02 (ref 1–1.03)
TROPONIN, HIGH SENSITIVITY: 315 NG/L (ref 0–12)
TROPONIN, HIGH SENSITIVITY: 353 NG/L (ref 0–12)
TSH SERPL DL<=0.005 MIU/L-ACNC: 1.5 UIU/ML (ref 0.4–4.2)
UROBILINOGEN, URINE: 0.2 EU/DL (ref 0–1)
WBC # BLD AUTO: 7.8 THOU/MM3 (ref 4.8–10.8)
WBC #/AREA URNS HPF: ABNORMAL /HPF
YEAST LIKE FUNGI URNS QL MICRO: ABNORMAL

## 2024-01-02 PROCEDURE — 2500000003 HC RX 250 WO HCPCS

## 2024-01-02 PROCEDURE — 51798 US URINE CAPACITY MEASURE: CPT

## 2024-01-02 PROCEDURE — 93010 ELECTROCARDIOGRAM REPORT: CPT | Performed by: INTERNAL MEDICINE

## 2024-01-02 PROCEDURE — 82330 ASSAY OF CALCIUM: CPT

## 2024-01-02 PROCEDURE — 82077 ASSAY SPEC XCP UR&BREATH IA: CPT

## 2024-01-02 PROCEDURE — 82948 REAGENT STRIP/BLOOD GLUCOSE: CPT

## 2024-01-02 PROCEDURE — 93307 TTE W/O DOPPLER COMPLETE: CPT | Performed by: INTERNAL MEDICINE

## 2024-01-02 PROCEDURE — 6360000002 HC RX W HCPCS

## 2024-01-02 PROCEDURE — 83880 ASSAY OF NATRIURETIC PEPTIDE: CPT

## 2024-01-02 PROCEDURE — 93005 ELECTROCARDIOGRAM TRACING: CPT | Performed by: PHYSICIAN ASSISTANT

## 2024-01-02 PROCEDURE — 6360000002 HC RX W HCPCS: Performed by: NURSE PRACTITIONER

## 2024-01-02 PROCEDURE — 80053 COMPREHEN METABOLIC PANEL: CPT

## 2024-01-02 PROCEDURE — 96365 THER/PROPH/DIAG IV INF INIT: CPT

## 2024-01-02 PROCEDURE — 81001 URINALYSIS AUTO W/SCOPE: CPT

## 2024-01-02 PROCEDURE — 83735 ASSAY OF MAGNESIUM: CPT

## 2024-01-02 PROCEDURE — 83690 ASSAY OF LIPASE: CPT

## 2024-01-02 PROCEDURE — 85025 COMPLETE CBC W/AUTO DIFF WBC: CPT

## 2024-01-02 PROCEDURE — 93005 ELECTROCARDIOGRAM TRACING: CPT | Performed by: NURSE PRACTITIONER

## 2024-01-02 PROCEDURE — 51702 INSERT TEMP BLADDER CATH: CPT

## 2024-01-02 PROCEDURE — 80143 DRUG ASSAY ACETAMINOPHEN: CPT

## 2024-01-02 PROCEDURE — 2580000003 HC RX 258

## 2024-01-02 PROCEDURE — 80307 DRUG TEST PRSMV CHEM ANLYZR: CPT

## 2024-01-02 PROCEDURE — G0378 HOSPITAL OBSERVATION PER HR: HCPCS

## 2024-01-02 PROCEDURE — 96366 THER/PROPH/DIAG IV INF ADDON: CPT

## 2024-01-02 PROCEDURE — 93005 ELECTROCARDIOGRAM TRACING: CPT

## 2024-01-02 PROCEDURE — 6370000000 HC RX 637 (ALT 250 FOR IP): Performed by: NURSE PRACTITIONER

## 2024-01-02 PROCEDURE — 84100 ASSAY OF PHOSPHORUS: CPT

## 2024-01-02 PROCEDURE — 96374 THER/PROPH/DIAG INJ IV PUSH: CPT

## 2024-01-02 PROCEDURE — 85610 PROTHROMBIN TIME: CPT

## 2024-01-02 PROCEDURE — 99223 1ST HOSP IP/OBS HIGH 75: CPT

## 2024-01-02 PROCEDURE — 99285 EMERGENCY DEPT VISIT HI MDM: CPT

## 2024-01-02 PROCEDURE — 80179 DRUG ASSAY SALICYLATE: CPT

## 2024-01-02 PROCEDURE — 6370000000 HC RX 637 (ALT 250 FOR IP)

## 2024-01-02 PROCEDURE — 96368 THER/DIAG CONCURRENT INF: CPT

## 2024-01-02 PROCEDURE — 71101 X-RAY EXAM UNILAT RIBS/CHEST: CPT

## 2024-01-02 PROCEDURE — 82010 KETONE BODYS QUAN: CPT

## 2024-01-02 PROCEDURE — 96376 TX/PRO/DX INJ SAME DRUG ADON: CPT

## 2024-01-02 PROCEDURE — 96375 TX/PRO/DX INJ NEW DRUG ADDON: CPT

## 2024-01-02 PROCEDURE — 84443 ASSAY THYROID STIM HORMONE: CPT

## 2024-01-02 PROCEDURE — 82140 ASSAY OF AMMONIA: CPT

## 2024-01-02 PROCEDURE — 84484 ASSAY OF TROPONIN QUANT: CPT

## 2024-01-02 PROCEDURE — 93307 TTE W/O DOPPLER COMPLETE: CPT

## 2024-01-02 PROCEDURE — 99223 1ST HOSP IP/OBS HIGH 75: CPT | Performed by: INTERNAL MEDICINE

## 2024-01-02 PROCEDURE — 6370000000 HC RX 637 (ALT 250 FOR IP): Performed by: PHYSICIAN ASSISTANT

## 2024-01-02 PROCEDURE — 36415 COLL VENOUS BLD VENIPUNCTURE: CPT

## 2024-01-02 RX ORDER — SODIUM CHLORIDE 0.9 % (FLUSH) 0.9 %
5-40 SYRINGE (ML) INJECTION PRN
Status: DISCONTINUED | OUTPATIENT
Start: 2024-01-02 | End: 2024-01-05 | Stop reason: HOSPADM

## 2024-01-02 RX ORDER — ONDANSETRON 2 MG/ML
4 INJECTION INTRAMUSCULAR; INTRAVENOUS EVERY 6 HOURS PRN
Status: DISCONTINUED | OUTPATIENT
Start: 2024-01-02 | End: 2024-01-05 | Stop reason: HOSPADM

## 2024-01-02 RX ORDER — INSULIN LISPRO 100 [IU]/ML
0-4 INJECTION, SOLUTION INTRAVENOUS; SUBCUTANEOUS NIGHTLY
Status: DISCONTINUED | OUTPATIENT
Start: 2024-01-02 | End: 2024-01-05 | Stop reason: HOSPADM

## 2024-01-02 RX ORDER — LIDOCAINE 4 G/G
1 PATCH TOPICAL DAILY
Status: DISCONTINUED | OUTPATIENT
Start: 2024-01-02 | End: 2024-01-05 | Stop reason: HOSPADM

## 2024-01-02 RX ORDER — HEPARIN SODIUM 5000 [USP'U]/ML
5000 INJECTION, SOLUTION INTRAVENOUS; SUBCUTANEOUS EVERY 8 HOURS SCHEDULED
Status: CANCELLED | OUTPATIENT
Start: 2024-01-02

## 2024-01-02 RX ORDER — INSULIN LISPRO 100 [IU]/ML
0-8 INJECTION, SOLUTION INTRAVENOUS; SUBCUTANEOUS
Status: DISCONTINUED | OUTPATIENT
Start: 2024-01-02 | End: 2024-01-05 | Stop reason: HOSPADM

## 2024-01-02 RX ORDER — BUMETANIDE 1 MG/1
2 TABLET ORAL DAILY
Status: DISCONTINUED | OUTPATIENT
Start: 2024-01-02 | End: 2024-01-05 | Stop reason: HOSPADM

## 2024-01-02 RX ORDER — MORPHINE SULFATE 2 MG/ML
2 INJECTION, SOLUTION INTRAMUSCULAR; INTRAVENOUS ONCE
Status: COMPLETED | OUTPATIENT
Start: 2024-01-02 | End: 2024-01-02

## 2024-01-02 RX ORDER — POTASSIUM CHLORIDE 7.45 MG/ML
10 INJECTION INTRAVENOUS PRN
Status: DISCONTINUED | OUTPATIENT
Start: 2024-01-02 | End: 2024-01-05 | Stop reason: HOSPADM

## 2024-01-02 RX ORDER — DEXTROSE MONOHYDRATE 100 MG/ML
INJECTION, SOLUTION INTRAVENOUS CONTINUOUS PRN
Status: DISCONTINUED | OUTPATIENT
Start: 2024-01-02 | End: 2024-01-05 | Stop reason: HOSPADM

## 2024-01-02 RX ORDER — ISOSORBIDE MONONITRATE 60 MG/1
120 TABLET, EXTENDED RELEASE ORAL DAILY
Status: DISCONTINUED | OUTPATIENT
Start: 2024-01-02 | End: 2024-01-05 | Stop reason: HOSPADM

## 2024-01-02 RX ORDER — ALPRAZOLAM 0.5 MG/1
0.5 TABLET ORAL ONCE
Status: COMPLETED | OUTPATIENT
Start: 2024-01-02 | End: 2024-01-02

## 2024-01-02 RX ORDER — DOXAZOSIN 2 MG/1
2 TABLET ORAL DAILY
Status: DISCONTINUED | OUTPATIENT
Start: 2024-01-02 | End: 2024-01-05 | Stop reason: HOSPADM

## 2024-01-02 RX ORDER — MORPHINE SULFATE 4 MG/ML
4 INJECTION, SOLUTION INTRAMUSCULAR; INTRAVENOUS
Status: DISCONTINUED | OUTPATIENT
Start: 2024-01-02 | End: 2024-01-02

## 2024-01-02 RX ORDER — METOPROLOL SUCCINATE 100 MG/1
100 TABLET, EXTENDED RELEASE ORAL DAILY
Status: DISCONTINUED | OUTPATIENT
Start: 2024-01-02 | End: 2024-01-03

## 2024-01-02 RX ORDER — HYDRALAZINE HYDROCHLORIDE 50 MG/1
50 TABLET, FILM COATED ORAL 3 TIMES DAILY
Status: DISCONTINUED | OUTPATIENT
Start: 2024-01-02 | End: 2024-01-05 | Stop reason: HOSPADM

## 2024-01-02 RX ORDER — POLYETHYLENE GLYCOL 3350 17 G/17G
17 POWDER, FOR SOLUTION ORAL DAILY PRN
Status: DISCONTINUED | OUTPATIENT
Start: 2024-01-02 | End: 2024-01-05 | Stop reason: HOSPADM

## 2024-01-02 RX ORDER — ONDANSETRON 4 MG/1
4 TABLET, ORALLY DISINTEGRATING ORAL EVERY 8 HOURS PRN
Status: DISCONTINUED | OUTPATIENT
Start: 2024-01-02 | End: 2024-01-05 | Stop reason: HOSPADM

## 2024-01-02 RX ORDER — MULTIVITAMIN WITH IRON
1 TABLET ORAL DAILY
Status: DISCONTINUED | OUTPATIENT
Start: 2024-01-02 | End: 2024-01-05 | Stop reason: HOSPADM

## 2024-01-02 RX ORDER — IBUPROFEN 600 MG/1
1 TABLET ORAL PRN
Status: DISCONTINUED | OUTPATIENT
Start: 2024-01-02 | End: 2024-01-05 | Stop reason: HOSPADM

## 2024-01-02 RX ORDER — FOLIC ACID 1 MG/1
1 TABLET ORAL DAILY
Status: DISCONTINUED | OUTPATIENT
Start: 2024-01-02 | End: 2024-01-05 | Stop reason: HOSPADM

## 2024-01-02 RX ORDER — NICOTINE 21 MG/24HR
1 PATCH, TRANSDERMAL 24 HOURS TRANSDERMAL DAILY
Status: DISCONTINUED | OUTPATIENT
Start: 2024-01-02 | End: 2024-01-05 | Stop reason: HOSPADM

## 2024-01-02 RX ORDER — OXYCODONE HYDROCHLORIDE 5 MG/1
5 TABLET ORAL EVERY 4 HOURS PRN
Status: DISCONTINUED | OUTPATIENT
Start: 2024-01-02 | End: 2024-01-05 | Stop reason: HOSPADM

## 2024-01-02 RX ORDER — LANOLIN ALCOHOL/MO/W.PET/CERES
100 CREAM (GRAM) TOPICAL DAILY
Status: DISCONTINUED | OUTPATIENT
Start: 2024-01-02 | End: 2024-01-05 | Stop reason: HOSPADM

## 2024-01-02 RX ORDER — LIDOCAINE 4 G/G
1 PATCH TOPICAL DAILY
Status: DISCONTINUED | OUTPATIENT
Start: 2024-01-02 | End: 2024-01-02

## 2024-01-02 RX ORDER — POTASSIUM CHLORIDE 20 MEQ/1
40 TABLET, EXTENDED RELEASE ORAL PRN
Status: DISCONTINUED | OUTPATIENT
Start: 2024-01-02 | End: 2024-01-05 | Stop reason: HOSPADM

## 2024-01-02 RX ORDER — ALPRAZOLAM 0.5 MG/1
0.5 TABLET ORAL NIGHTLY PRN
Status: DISCONTINUED | OUTPATIENT
Start: 2024-01-02 | End: 2024-01-05 | Stop reason: HOSPADM

## 2024-01-02 RX ORDER — MAGNESIUM SULFATE IN WATER 40 MG/ML
2000 INJECTION, SOLUTION INTRAVENOUS PRN
Status: DISCONTINUED | OUTPATIENT
Start: 2024-01-02 | End: 2024-01-05 | Stop reason: HOSPADM

## 2024-01-02 RX ORDER — LORAZEPAM 1 MG/1
1 TABLET ORAL EVERY 4 HOURS PRN
Status: DISCONTINUED | OUTPATIENT
Start: 2024-01-02 | End: 2024-01-05

## 2024-01-02 RX ORDER — MEXILETINE HYDROCHLORIDE 150 MG/1
300 CAPSULE ORAL 3 TIMES DAILY
Status: DISCONTINUED | OUTPATIENT
Start: 2024-01-02 | End: 2024-01-05 | Stop reason: HOSPADM

## 2024-01-02 RX ORDER — SODIUM CHLORIDE 9 MG/ML
INJECTION, SOLUTION INTRAVENOUS PRN
Status: DISCONTINUED | OUTPATIENT
Start: 2024-01-02 | End: 2024-01-05 | Stop reason: HOSPADM

## 2024-01-02 RX ORDER — SODIUM CHLORIDE 0.9 % (FLUSH) 0.9 %
5-40 SYRINGE (ML) INJECTION EVERY 12 HOURS SCHEDULED
Status: DISCONTINUED | OUTPATIENT
Start: 2024-01-02 | End: 2024-01-05 | Stop reason: HOSPADM

## 2024-01-02 RX ORDER — MORPHINE SULFATE 2 MG/ML
2 INJECTION, SOLUTION INTRAMUSCULAR; INTRAVENOUS
Status: DISCONTINUED | OUTPATIENT
Start: 2024-01-02 | End: 2024-01-02

## 2024-01-02 RX ORDER — INSULIN GLARGINE 100 [IU]/ML
10 INJECTION, SOLUTION SUBCUTANEOUS NIGHTLY
Status: DISCONTINUED | OUTPATIENT
Start: 2024-01-02 | End: 2024-01-05 | Stop reason: HOSPADM

## 2024-01-02 RX ORDER — ACETAMINOPHEN 325 MG/1
650 TABLET ORAL EVERY 6 HOURS PRN
Status: DISCONTINUED | OUTPATIENT
Start: 2024-01-02 | End: 2024-01-05 | Stop reason: HOSPADM

## 2024-01-02 RX ORDER — ACETAMINOPHEN 650 MG/1
650 SUPPOSITORY RECTAL EVERY 6 HOURS PRN
Status: DISCONTINUED | OUTPATIENT
Start: 2024-01-02 | End: 2024-01-05 | Stop reason: HOSPADM

## 2024-01-02 RX ORDER — CALCIUM GLUCONATE 10 MG/ML
1000 INJECTION, SOLUTION INTRAVENOUS ONCE
Status: COMPLETED | OUTPATIENT
Start: 2024-01-02 | End: 2024-01-02

## 2024-01-02 RX ORDER — AMLODIPINE BESYLATE 5 MG/1
5 TABLET ORAL 2 TIMES DAILY
Status: DISCONTINUED | OUTPATIENT
Start: 2024-01-02 | End: 2024-01-02

## 2024-01-02 RX ORDER — ATORVASTATIN CALCIUM 40 MG/1
40 TABLET, FILM COATED ORAL NIGHTLY
Status: DISCONTINUED | OUTPATIENT
Start: 2024-01-02 | End: 2024-01-05 | Stop reason: HOSPADM

## 2024-01-02 RX ORDER — MEXILETINE HYDROCHLORIDE 150 MG/1
300 CAPSULE ORAL ONCE
Status: COMPLETED | OUTPATIENT
Start: 2024-01-02 | End: 2024-01-02

## 2024-01-02 RX ADMIN — TICAGRELOR 90 MG: 90 TABLET ORAL at 20:27

## 2024-01-02 RX ADMIN — AMIODARONE HYDROCHLORIDE 0.5 MG/MIN: 1.8 INJECTION, SOLUTION INTRAVENOUS at 10:27

## 2024-01-02 RX ADMIN — MORPHINE SULFATE 2 MG: 2 INJECTION, SOLUTION INTRAMUSCULAR; INTRAVENOUS at 03:09

## 2024-01-02 RX ADMIN — SODIUM CHLORIDE, PRESERVATIVE FREE 10 ML: 5 INJECTION INTRAVENOUS at 09:16

## 2024-01-02 RX ADMIN — MORPHINE SULFATE 2 MG: 2 INJECTION, SOLUTION INTRAMUSCULAR; INTRAVENOUS at 04:05

## 2024-01-02 RX ADMIN — OXYCODONE 5 MG: 5 TABLET ORAL at 20:18

## 2024-01-02 RX ADMIN — CALCIUM GLUCONATE 1000 MG: 10 INJECTION, SOLUTION INTRAVENOUS at 15:33

## 2024-01-02 RX ADMIN — HYDRALAZINE HYDROCHLORIDE 50 MG: 50 TABLET, FILM COATED ORAL at 09:17

## 2024-01-02 RX ADMIN — OXYCODONE 5 MG: 5 TABLET ORAL at 09:55

## 2024-01-02 RX ADMIN — HYDRALAZINE HYDROCHLORIDE 50 MG: 50 TABLET, FILM COATED ORAL at 16:16

## 2024-01-02 RX ADMIN — MEXILETINE HYDROCHLORIDE 300 MG: 150 CAPSULE ORAL at 09:16

## 2024-01-02 RX ADMIN — BUMETANIDE 2 MG: 1 TABLET ORAL at 09:16

## 2024-01-02 RX ADMIN — TICAGRELOR 90 MG: 90 TABLET ORAL at 09:16

## 2024-01-02 RX ADMIN — INSULIN GLARGINE 10 UNITS: 100 INJECTION, SOLUTION SUBCUTANEOUS at 20:18

## 2024-01-02 RX ADMIN — METOPROLOL SUCCINATE 100 MG: 100 TABLET, EXTENDED RELEASE ORAL at 09:18

## 2024-01-02 RX ADMIN — LORAZEPAM 1 MG: 1 TABLET ORAL at 11:13

## 2024-01-02 RX ADMIN — AMIODARONE HYDROCHLORIDE 0.5 MG/MIN: 1.8 INJECTION, SOLUTION INTRAVENOUS at 21:04

## 2024-01-02 RX ADMIN — SODIUM CHLORIDE, PRESERVATIVE FREE 10 ML: 5 INJECTION INTRAVENOUS at 20:20

## 2024-01-02 RX ADMIN — LORAZEPAM 1 MG: 1 TABLET ORAL at 23:12

## 2024-01-02 RX ADMIN — ATORVASTATIN CALCIUM 40 MG: 40 TABLET, FILM COATED ORAL at 20:18

## 2024-01-02 RX ADMIN — MEXILETINE HYDROCHLORIDE 300 MG: 150 CAPSULE ORAL at 20:18

## 2024-01-02 RX ADMIN — HYDRALAZINE HYDROCHLORIDE 50 MG: 50 TABLET, FILM COATED ORAL at 20:18

## 2024-01-02 RX ADMIN — MORPHINE SULFATE 4 MG: 4 INJECTION, SOLUTION INTRAMUSCULAR; INTRAVENOUS at 05:49

## 2024-01-02 RX ADMIN — INSULIN LISPRO 2 UNITS: 100 INJECTION, SOLUTION INTRAVENOUS; SUBCUTANEOUS at 18:28

## 2024-01-02 RX ADMIN — ALPRAZOLAM 0.5 MG: 0.5 TABLET ORAL at 20:18

## 2024-01-02 RX ADMIN — MEXILETINE HYDROCHLORIDE 300 MG: 150 CAPSULE ORAL at 03:36

## 2024-01-02 RX ADMIN — AMIODARONE HYDROCHLORIDE 150 MG: 1.5 INJECTION, SOLUTION INTRAVENOUS at 04:19

## 2024-01-02 RX ADMIN — MEXILETINE HYDROCHLORIDE 300 MG: 150 CAPSULE ORAL at 16:16

## 2024-01-02 RX ADMIN — AMIODARONE HYDROCHLORIDE 1 MG/MIN: 1.8 INJECTION, SOLUTION INTRAVENOUS at 04:35

## 2024-01-02 RX ADMIN — ALPRAZOLAM 0.5 MG: 0.5 TABLET ORAL at 06:22

## 2024-01-02 RX ADMIN — Medication 100 MG: at 09:18

## 2024-01-02 RX ADMIN — LORAZEPAM 1 MG: 1 TABLET ORAL at 16:18

## 2024-01-02 RX ADMIN — OXYCODONE 5 MG: 5 TABLET ORAL at 14:30

## 2024-01-02 RX ADMIN — Medication 1 TABLET: at 09:18

## 2024-01-02 RX ADMIN — INSULIN LISPRO 2 UNITS: 100 INJECTION, SOLUTION INTRAVENOUS; SUBCUTANEOUS at 11:48

## 2024-01-02 RX ADMIN — DOXAZOSIN MESYLATE 2 MG: 2 TABLET ORAL at 09:18

## 2024-01-02 RX ADMIN — FOLIC ACID 1 MG: 1 TABLET ORAL at 09:17

## 2024-01-02 RX ADMIN — ISOSORBIDE MONONITRATE 120 MG: 60 TABLET, EXTENDED RELEASE ORAL at 09:18

## 2024-01-02 ASSESSMENT — HEART SCORE: ECG: 1

## 2024-01-02 ASSESSMENT — PAIN DESCRIPTION - LOCATION
LOCATION: BACK
LOCATION: CHEST

## 2024-01-02 ASSESSMENT — PAIN DESCRIPTION - PAIN TYPE
TYPE: ACUTE PAIN
TYPE: ACUTE PAIN

## 2024-01-02 ASSESSMENT — PAIN SCALES - GENERAL
PAINLEVEL_OUTOF10: 10
PAINLEVEL_OUTOF10: 8
PAINLEVEL_OUTOF10: 10
PAINLEVEL_OUTOF10: 9
PAINLEVEL_OUTOF10: 7
PAINLEVEL_OUTOF10: 10
PAINLEVEL_OUTOF10: 10
PAINLEVEL_OUTOF10: 6
PAINLEVEL_OUTOF10: 7
PAINLEVEL_OUTOF10: 9

## 2024-01-02 ASSESSMENT — PAIN DESCRIPTION - DESCRIPTORS
DESCRIPTORS: ACHING;DISCOMFORT
DESCRIPTORS: HEAVINESS
DESCRIPTORS: ACHING;DISCOMFORT

## 2024-01-02 ASSESSMENT — PAIN DESCRIPTION - FREQUENCY
FREQUENCY: INTERMITTENT
FREQUENCY: CONTINUOUS

## 2024-01-02 ASSESSMENT — PAIN DESCRIPTION - ORIENTATION
ORIENTATION: LEFT
ORIENTATION: MID
ORIENTATION: LEFT;UPPER;LOWER
ORIENTATION: LEFT
ORIENTATION: LEFT

## 2024-01-02 ASSESSMENT — PAIN DESCRIPTION - ONSET
ONSET: ON-GOING
ONSET: ON-GOING

## 2024-01-02 ASSESSMENT — PAIN - FUNCTIONAL ASSESSMENT
PAIN_FUNCTIONAL_ASSESSMENT: ACTIVITIES ARE NOT PREVENTED

## 2024-01-02 NOTE — ED NOTES
Provider notified of patient's run of PVC's on heart monitor. Defib pads placed on patient at this time.

## 2024-01-02 NOTE — CARE COORDINATION
1/2/24, 12:41 PM EST    DISCHARGE PLANNING EVALUATION    Received Social Work consult “For consideration of Rehab”.  Addiction/MAAME  consulted.   met with patient, following for needs.  Full Social Consult deferred.

## 2024-01-02 NOTE — CONSULTS
[facility]  [facility] CCU[facility]STEPDOWN 3B  [address] [facility] [facility] [address]  Dept: [phone]  Loc: [phone]  Visit Date: 1/2/2024    Urology Consult Note    Reason for Consult:  urinary retention  Requesting Physician:  medicine    History Obtained From:  patient, electronic medical record    Chief Complaint: depression    HISTORY OF PRESENT ILLNESS:                The patient is a 58 y.o. male with significant past medical history of PMHx of Paroxysmal A-fib, alcohol abuse, CHF, DMII, hypertension who presents to [facility] with depression  who presents with intoxication to ER, c/o back pain  URO consulted for urinary retention  Bladder scan 928ml  Attempted straight cath in ER x2 unsuccessful   Was able to void on arrival to floor >600ml  Repeat bladder scan 200ml      Past Medical History:        Diagnosis Date    Acute systolic CHF (congestive heart failure) ([facility]) 07/16/2015    Alcohol abuse     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    CAD (coronary artery disease) 03/25/2014    s/p CABG in may 2014    Chronic kidney disease     Diabetes mellitus (HCC)     Drug abuse ([facility])     hx of cacaine abuse, stopped abusing drugs in 2012    GERD (gastroesophageal reflux disease)     History of implantable cardiac defibrillator (ICD)     Hypertension     Intradural extramedullary spinal tumor     Long term (current) use of anticoagulants 08/29/2019    Medtronic dual icd      Neuromuscular disorder ([facility])     Paroxysmal atrial fibrillation ([facility]) 07/22/2014    Severe obesity (BMI 35.0-39.9) with comorbidity ([facility]) 05/22/2023    V-tach ([facility])     s/p ablation in dec 2014     Past Surgical History:        Procedure Laterality Date    CARDIAC CATHETERIZATION  03/20/2014    [facility]    CARDIAC DEFIBRILLATOR PLACEMENT  10/13/2015    MEDTRONIC [place], MRI CONDITIONAL ICD    CARDIAC ELECTROPHYSIOLOGY STUDY AND ABLATION      CORONARY ARTERY BYPASS GRAFT  05/09/2014    3 bypass     EKG 12-LEAD  07/17/2015         OTHER SURGICAL HISTORY Left 10/21/2014    Left Bursectomy, I & D Left Elbow - Dr. Garduno    PACEMAKER PLACEMENT      [place] LAMINECTOMY W/O FFD > 2 VERT SEG LUMBAR N/A 01/16/2018    LUMBAR AND SACRAL LAMINECTOMY, REMOVAL OF INTRASPINAL TUMORS performed by Burke Garcia MD at [facility] OR    VASCULAR SURGERY      cabg harvest from legs     Allergies:  Latex, Ativan [lorazepam], Pcn [penicillins], and Zoloft [sertraline hcl]  Social History:  Social History     Socioeconomic History    Marital status:      Spouse name: Not on file    Number of children: 3    Years of education: 12    Highest education level: High school graduate   Occupational History     Employer: [employer]   Tobacco Use    Smoking status: Every Day     Current packs/day: 0.50     Average packs/day: 0.5 packs/day for 43.5 years (21.7 ttl pk-yrs)     Types: Cigarettes     Start date: 7/16/1980    Smokeless tobacco: Former     Types: Snuff     Quit date: 3/31/2023   Vaping Use    Vaping Use: Never used   Substance and Sexual Activity    Alcohol use: Yes     Alcohol/week: 10.0 standard drinks of alcohol     Types: 10 Shots of liquor per week     Comment: a pint or more of liquor a day    Drug use: Not Currently    Sexual activity: Not Currently     Partners: Female   Other Topics Concern    Not on file   Social History Narrative    Not on file     Social Determinants of Health     Financial Resource Strain: Low Risk  (5/22/2023)    Overall Financial Resource Strain (CARDIA)     Difficulty of Paying Living Expenses: Not hard at all   Food Insecurity: Patient Declined (1/2/2024)    Hunger Vital Sign     Worried About Running Out of Food in the Last Year: Patient declined     Ran Out of Food in the Last Year: Patient declined   Transportation Needs: Patient Declined (1/2/2024)    PRAPARE - Transportation     Lack of Transportation (Medical): Patient declined     Lack of Transportation (Non-Medical): Patient

## 2024-01-02 NOTE — H&P
Jose Diaz MD   insulin glargine (LANTUS SOLOSTAR) 100 UNIT/ML injection pen Inject 10 Units into the skin nightly 8/16/23   Jose Diaz MD   amLODIPine (NORVASC) 5 MG tablet Take 1 tablet by mouth in the morning and at bedtime    ProviderBryn MD   metoprolol succinate (TOPROL XL) 100 MG extended release tablet Take 1 tablet by mouth daily 6/26/23   Fany Guzmán MD   bumetanide (BUMEX) 2 MG tablet TAKE 1 TABLET BY MOUTH IN THE MORNING 6/26/23   Kristina Malone MD   dapagliflozin (FARXIGA) 5 MG tablet Take 1 tablet by mouth every morning 5/8/23   Samuel Kathleen MD   isosorbide mononitrate (IMDUR) 120 MG extended release tablet Take 1 tablet by mouth daily 4/10/23   Leonel Adorno, APRN - CNP       Medical History      Diagnosis Date    Acute systolic CHF (congestive heart failure) (HCC) 07/16/2015    Alcohol abuse     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    CAD (coronary artery disease) 03/25/2014    s/p CABG in may 2014    Chronic kidney disease     Diabetes mellitus (HCC)     Drug abuse ([facility])     hx of cacaine abuse, stopped abusing drugs in 2012    GERD (gastroesophageal reflux disease)     Hypertension     Intradural extramedullary spinal tumor     Medtronic dual icd      Neuromuscular disorder ([facility])     Paroxysmal atrial fibrillation ([facility]) 07/22/2014    Severe obesity (BMI 35.0-39.9) with comorbidity ([facility]) 05/22/2023    V-tach ([facility])     s/p ablation in dec 2014       Surgical History      Procedure Laterality Date    CARDIAC CATHETERIZATION  03/20/2014    [facility]    CARDIAC DEFIBRILLATOR PLACEMENT  10/13/2015    [employer] [place], MRI CONDITIONAL ICD    CARDIAC ELECTROPHYSIOLOGY STUDY AND ABLATION      CORONARY ARTERY BYPASS GRAFT  05/09/2014    3 bypass    EKG 12-LEAD  07/17/2015         OTHER SURGICAL HISTORY Left 10/21/2014    Left Bursectomy, I & D Left Elbow - Dr. Garduno    PACEMAKER PLACEMENT      [place] LAMINECTOMY W/O FFD > 2 VERT SEG LUMBAR N/A 01/16/2018     LUMBAR AND SACRAL LAMINECTOMY, REMOVAL OF INTRASPINAL TUMORS performed by Burke Garcia MD at [facility] OR    VASCULAR SURGERY      cabg harvest from legs       Tobacco use:   reports that he has been smoking cigarettes. He started smoking about 43 years ago. He has a 21.7 pack-year smoking history. He quit smokeless tobacco use about 9 months ago.  His smokeless tobacco use included snuff.  Alcohol use:   reports that he does not currently use alcohol after a past usage of about 10.0 standard drinks of alcohol per week.  Drug use:  reports that he does not currently use drugs.     Family Hx      Problem Relation Age of Onset    Diabetes Mother     High Blood Pressure Mother     Stroke Mother     Diabetes Father     Heart Disease Father     High Blood Pressure Father     Heart Disease Sister         CABG    Diabetes Maternal Grandmother     Diabetes Maternal Grandfather     Heart Disease Paternal Grandfather          PT/OT Eval Status: pending clinical course  Diet: NPO for now, if no intervention may start on CCD  DVT prophylaxis: coumadin  Code Status: full  Disposition: admit to stepdown for observation for now pending clinical course. May require admission.     Thank you Leonel Adorno APRN - CNP for the opportunity to be involved in this patient's care.    Electronically signed by ISABELLA Bowman CNP on 1/2/2024 at 3:18 AM.

## 2024-01-02 NOTE — PLAN OF CARE
Problem: Discharge Planning  Goal: Discharge to home or other facility with appropriate resources  1/2/2024 1104 by Dede Macdonald RN  Outcome: Progressing  Flowsheets (Taken 1/2/2024 0558 by Mariya Hernandez, RN)  Discharge to home or other facility with appropriate resources: Identify barriers to discharge with patient and caregiver  Note: He is from home alone and plans on returning there at discharge.    1/2/2024 0558 by Mariya Hernandez RN  Outcome: Progressing  Flowsheets  Taken 1/2/2024 0558  Discharge to home or other facility with appropriate resources: Identify barriers to discharge with patient and caregiver  Taken 1/2/2024 0530  Discharge to home or other facility with appropriate resources:   Arrange for needed discharge resources and transportation as appropriate   Identify barriers to discharge with patient and caregiver     Problem: Pain  Goal: Verbalizes/displays adequate comfort level or baseline comfort level  1/2/2024 1104 by Dede Macdonald RN  Outcome: Progressing  Flowsheets (Taken 1/2/2024 0558 by Mariya Hernandez, RN)  Verbalizes/displays adequate comfort level or baseline comfort level: Encourage patient to monitor pain and request assistance  Note: Ongoing assessment & interventions provided throughout shift.  Reminded patient to report any pain, pressure, or shortness of breath to the nurse.  Pain medications provided per physician's orders.      1/2/2024 0558 by Mariya Hernandez RN  Outcome: Progressing  Flowsheets (Taken 1/2/2024 0558)  Verbalizes/displays adequate comfort level or baseline comfort level: Encourage patient to monitor pain and request assistance     Problem: Safety - Adult  Goal: Free from fall injury  1/2/2024 1104 by Dede Macdonald RN  Outcome: Progressing  Flowsheets (Taken 1/2/2024 0558 by Mariya Hernandez, RN)  Free From Fall Injury: Instruct family/caregiver on patient safety  Note: Bed locked & in low position, call light in reach, side-rails up x2,

## 2024-01-02 NOTE — ED NOTES
Patient has 928 ml in bladder per bladder scan. Attempted to straight cath x2 with no success. Inpatient provider notified. Patient transported upstairs at this time.

## 2024-01-02 NOTE — PROGRESS NOTES
0942- Arrived to patient room to complete AOD consult. Patient with medical staff at this time. MAAME to re-attempt.

## 2024-01-02 NOTE — ED NOTES
Patient upset yelling at this RN. Patient yelling, \"I need some f*cking pain medicine.\" Provider notified. Patient also refusing to keep blood pressure cuff and pulse oximeter on. Patient yelling multiple profanities in room.

## 2024-01-02 NOTE — PLAN OF CARE
Problem: Discharge Planning  Goal: Discharge to home or other facility with appropriate resources  Outcome: Progressing  Flowsheets (Taken 1/2/2024 0558)  Discharge to home or other facility with appropriate resources: Identify barriers to discharge with patient and caregiver     Problem: Pain  Goal: Verbalizes/displays adequate comfort level or baseline comfort level  Outcome: Progressing  Flowsheets (Taken 1/2/2024 0558)  Verbalizes/displays adequate comfort level or baseline comfort level: Encourage patient to monitor pain and request assistance     Problem: Safety - Adult  Goal: Free from fall injury  Outcome: Progressing  Flowsheets (Taken 1/2/2024 0558)  Free From Fall Injury: Instruct family/caregiver on patient safety     Problem: Risk for Elopement  Goal: Patient will not exit the unit/facility without proper excort  Outcome: Progressing  Flowsheets (Taken 1/2/2024 0558)  Nursing Interventions for Elopement Risk: Assist with personal care needs such as toileting, eating, dressing, as needed to reduce the risk of wandering

## 2024-01-02 NOTE — CARE COORDINATION
Case Management Assessment  Initial Evaluation    Date/Time of Evaluation: 1/2/2024 1:45 PM  Assessment Completed by: Mounika Rueda RN    If patient is discharged prior to next notation, then this note serves as note for discharge by case management.    Patient Name: Garrett Duncan                   YOB: 1965  Diagnosis: Cardiac arrhythmia [I49.9]  V tach (HCC) [I47.20]  Non compliance w medication regimen [Z91.148]  Acute alcoholic intoxication without complication (HCC) [F10.920]  Contusion of rib on left side, initial encounter [S20.212A]  Stage 5 chronic kidney disease not on chronic dialysis (HCC) [N18.5]  Other ventricular tachycardia (HCC) [I47.29]                   Date / Time: 1/2/2024 12:51 AM  [facility]     Patient Admission Status: Observation   Readmission Risk Low 0-14, Mod 15-19), High > 20: Readmission Risk Score: 24.5    Current PCP: Leonel Adorno, ISABELLA - CNP  PCP verified by CM? Yes    Chart Reviewed: Yes      History Provided by: Patient  Patient Orientation: Alert and Oriented    Patient Cognition: Alert    Hospitalization in the last 30 days (Readmission):  No    If yes, Readmission Assessment in CM Navigator will be completed.    Advance Directives:      Code Status: Full Code   Patient's Primary Decision Maker is: Named in Scanned ACP Document    Primary Decision Maker: Jesica Duncan (Jose) - Child - [phone]    Secondary Decision Maker: PerlaLou - Child - [phone]    Discharge Planning:    Patient lives with: Alone Type of Home: House  Primary Care Giver: Self  Patient Support Systems include: Children   Current Financial resources: Other (Comment) (BCBS)  Current community resources: None  Current services prior to admission: None            Current DME:              Type of Home Care services:  None    ADLS  Prior functional level: Independent in ADLs/IADLs  Current functional level: Independent in ADLs/IADLs    Family can provide assistance at DC:  °F (36.4 °C) 98.2 °F (36.8 °C)   TempSrc: Oral  Oral Oral   SpO2:   98% 100%   Weight:       Height:         Procedure:   1/2 XR Left Ribs: No acute findings. Chronic moderate-to-severe cardiomegaly and mild interstitial edema. No evidence of rib fracture  1/2 ECHO: ordered     The Plan for Transition of Care is related to the following treatment goals of Cardiac arrhythmia [I49.9]  V tach (HCC) [I47.20]  Non compliance w medication regimen [Z91.148]  Acute alcoholic intoxication without complication (HCC) [F10.920]  Contusion of rib on left side, initial encounter [S20.212A]  Stage 5 chronic kidney disease not on chronic dialysis (HCC) [N18.5]  Other ventricular tachycardia (HCC) [I47.29]    Patient Goals/Plan/Treatment Preferences: Met with Garrett. He is independent and actively drives. He lives at home alone. Denies need for DME or HH services. Will need ride home at discharge- states daughters are unable to provide transportation.     Transportation/Food Security/Housekeeping Addressed: No issues identified.     Mounika Rueda RN  Case Management Department

## 2024-01-02 NOTE — FLOWSHEET NOTE
01/02/24 1033   Treatment Team Notification   Reason for Communication Patient/Family request   Name of Team Member Notified Lou Stonewall   Treatment Team Role Physician Assistant   Method of Communication Secure Message   Response See orders   Notification Time 1033     He takes Xanax .5 nightly prn which is ordered but he is asking if he can have a dose now...he has lots of tremors in his hand

## 2024-01-02 NOTE — ED NOTES
Patient medicated per MAR. Patient resting in bed. Respirations easy and unlabored. No distress noted. Call light within reach.

## 2024-01-02 NOTE — ED NOTES
ED to inpatient nurses report      Chief Complaint:  Chief Complaint   Patient presents with    Depression     Present to ED from: home    MOA:     LOC: alert and orientated to name, place, date  Mobility: Requires assistance * 2  Oxygen Baseline: room air    Current needs required: none     Code Status:   Prior    What abnormal results were found and what did you give/do to treat them? Frequent pvc- amiodarone drip.    Any procedures or intervention occur? Morphine for pain    Mental Status:  Level of Consciousness: Alert (0)    Psych Assessment:        Vitals:  Patient Vitals for the past 24 hrs:   BP Temp Temp src Pulse Resp SpO2   01/02/24 0338 (!) 150/73 -- -- 100 16 97 %   01/02/24 0336 (!) 150/73 -- -- -- -- --   01/02/24 0249 (!) 149/67 -- -- 90 19 93 %   01/02/24 0201 128/79 -- -- 92 16 100 %   01/02/24 0104 (!) 181/85 98.5 °F (36.9 °C) Oral 91 16 99 %        LDAs:   Peripheral IV 01/02/24 Left Antecubital (Active)   Site Assessment Clean, dry & intact 01/02/24 0202       Ambulatory Status:  No data recorded    Diagnosis:  DISPOSITION Admitted 01/02/2024 03:59:00 AM   Final diagnoses:   Acute alcoholic intoxication without complication (HCC)   V tach (HCC)   Non compliance w medication regimen   Stage 5 chronic kidney disease not on chronic dialysis (HCC)   Contusion of rib on left side, initial encounter        Consults:  IP CONSULT TO PHARMACY  IP CONSULT TO CARDIOLOGY     Pain Score:  Pain Assessment  Pain Level: 10  Pain Location: Back    C-SSRS:   Risk of Suicide: Low Risk    Sepsis Screening:  Sepsis Risk Score: 3.63    [facility] Fall Risk:       Swallow Screening        Preferred Language:   English      ALLERGIES     Latex, Ativan [lorazepam], Pcn [penicillins], and Zoloft [sertraline hcl]    SURGICAL HISTORY       Past Surgical History:   Procedure Laterality Date    CARDIAC CATHETERIZATION  03/20/2014    [facility]    CARDIAC DEFIBRILLATOR PLACEMENT  10/13/2015    [employer]TR[employer] [place], MRI CONDITIONAL ICD

## 2024-01-02 NOTE — ED TRIAGE NOTES
Patient presents to ED via EMS with chief complaint of depression. Patient states his best friend  in September and he has been depressed ever since. Patient stated, \"I am really drunk\" at time of triage. Patient reports he has been on a 7 day alcohol cobb. Denies suicidal thoughts. Patient placed in safe room that is ligature resistant with continuous monitoring in place. Provider notified, requested an assessment by behavioral health . Patient belongings secured in a locked lockers outside of the room. Explained suicide prevention precautions to the patient including constant observer.

## 2024-01-02 NOTE — CONSULTS
[facility]  Resident Consult Note    Patient's Name/Date of Birth: Garrett Duncan / 1965 (58 y.o.)    Date: January 2, 2024     Referring Provider: Lou Reina PA    Consulted for: Short runs of ventricular tachycardia, PVCs      HPI: This is a pleasant 58 y.o. male presenting with acute alcohol intoxication, signs of withdrawal.  His medical history includes paroxysmal atrial fibrillation s/p ablation, known ventricular tachycardia s/p ablation, CAD s/p CABG s/p ICD, HFrEF, HTN, T2DM, CKD, GERD, alcohol abuse.  Patient endorsing anxiety, is tremulous during review.  He denies chest pain, nausea.  He says that he has been not been taking any medications for the past week, has been only drinking and has not been eating.  This is a patient well known to the cardiology service.    Echo:     Echo (TTE) complete    11/29/2023    Left Ventricle Severely reduced left ventricular systolic function with a visually estimated EF of 30 - 35%. Left ventricle is severely dilated. Normal wall thickness. Severe global hypokinesis present. Abnormal diastolic function.   Left Atrium Left atrium is mildly dilated.   Right Ventricle Right ventricle size is normal. Normal systolic function.   Right Atrium Right atrium size is normal.   Aortic Valve Valve structure is normal. No regurgitation. No stenosis.   Mitral Valve Valve structure is normal. Trace regurgitation. No stenosis noted.   Tricuspid Valve Valve structure is normal. No regurgitation. No stenosis noted.   Pulmonic Valve Valve structure is normal. No regurgitation. No stenosis noted.   Aorta Normal sized aortic root and ascending aorta.   IVC/Hepatic Veins IVC diameter is less than or equal to 21 mm and decreases greater than 50% during inspiration; therefore the estimated right atrial pressure is normal (~3 mmHg). IVC size is normal.   Pericardium No pericardial effusion.       All labs, EKG's, diagnostic testing and images as well as  hydralazine, doxazosin, Bumex  Begin atorvastatin 40  Monitor and replace electrolytes-K > 4.0, Mg > 2.0, iCal >1.12  Strict I's and O's  Monitor LFTs  Counseled about need to comply with medications   Will need outpatient follow up with his cardiologist and CHF clinic     Thank you for allowing us to participate in the care of this patient.  Please do not hesitate to call us with questions.    Time spent reviewing notes, data, discussing with patient/family, and formulating plan with clinical documentation was approximately 80 minutes.     Electronically signed by Jose Muñiz DO on 1/2/2024 at 4:38 PM  Patient evaluation and management staffed with Dr. Kristina Malone MD.      Interventional Cardiology - [facility]    Attestation     I performed the subjective and objective examination of the patient and discussed management with the resident/CNP. I reviewed the resident/CNP’s note. Any areas of disagreement were adjusted in the chart. I have personally evaluated this patient and have completed at least one if not all key elements of the E/M (history, physical exam, and MDM).  Additional findings are as noted. I agree with the chief complaint, past medical history, past surgical history, allergies, medications, social and family history as documented unless otherwise noted.     Above findings and plan of care were discussed with patient, questions were answered, agreeable to plan.     Thank you for allowing me to participate in the care of this patient.  Please let me know if I can be of any further assistance.    Electronically signed by Kristina Malone MD, [facility], [facility] on 1/2/2024 at 5:55 PM  Interventional Cardiology - [facility]

## 2024-01-02 NOTE — PLAN OF CARE
Patient was admitted early this a.m. (1/2) due to cardiac arrhythmias.  Patient states that he has been on a binge drinking for multiple days in a row.  During evaluation, patient was very anxious and tremoring.  However patient's tremor stopped when he was focused on something else.  Patient refused phenyl bar protocol due to stating that it made him very paranoid.  Patient was just recently admitted and discharged on December 1 for similar issues.    Cardiac arrhythmias: Cardiology consulted.  Appreciate their recommendations  Alcohol abuse: Patient refuses phenobarbital protocol.  Ativan p.o. ordered.  Continue to monitor.    Electronically signed by HUGH Dewitt on 1/2/2024 at 2:52 PM

## 2024-01-03 LAB
ALBUMIN SERPL BCG-MCNC: 3.1 G/DL (ref 3.5–5.1)
ALP SERPL-CCNC: 99 U/L (ref 38–126)
ALT SERPL W/O P-5'-P-CCNC: 26 U/L (ref 11–66)
ANION GAP SERPL CALC-SCNC: 18 MEQ/L (ref 8–16)
ANION GAP SERPL CALC-SCNC: 18 MEQ/L (ref 8–16)
ARTERIAL PATENCY WRIST A: POSITIVE
AST SERPL-CCNC: 36 U/L (ref 5–40)
BASE EXCESS BLDA CALC-SCNC: -4.6 MMOL/L (ref -2.5–2.5)
BDY SITE: ABNORMAL
BILIRUB SERPL-MCNC: 0.4 MG/DL (ref 0.3–1.2)
BUN SERPL-MCNC: 51 MG/DL (ref 7–22)
BUN SERPL-MCNC: 54 MG/DL (ref 7–22)
CA-I BLD ISE-SCNC: 1.09 MMOL/L (ref 1.12–1.32)
CALCIUM SERPL-MCNC: 8.4 MG/DL (ref 8.5–10.5)
CALCIUM SERPL-MCNC: 8.4 MG/DL (ref 8.5–10.5)
CHLORIDE SERPL-SCNC: 103 MEQ/L (ref 98–111)
CHLORIDE SERPL-SCNC: 103 MEQ/L (ref 98–111)
CO2 SERPL-SCNC: 15 MEQ/L (ref 23–33)
CO2 SERPL-SCNC: 17 MEQ/L (ref 23–33)
COLLECTED BY:: ABNORMAL
CREAT SERPL-MCNC: 3 MG/DL (ref 0.4–1.2)
CREAT SERPL-MCNC: 3.1 MG/DL (ref 0.4–1.2)
DEPRECATED RDW RBC AUTO: 53.5 FL (ref 35–45)
DEVICE: ABNORMAL
EKG ATRIAL RATE: 84 BPM
EKG ATRIAL RATE: 93 BPM
EKG P AXIS: 81 DEGREES
EKG P AXIS: 96 DEGREES
EKG P-R INTERVAL: 194 MS
EKG P-R INTERVAL: 204 MS
EKG Q-T INTERVAL: 414 MS
EKG Q-T INTERVAL: 432 MS
EKG QRS DURATION: 90 MS
EKG QRS DURATION: 92 MS
EKG QTC CALCULATION (BAZETT): 514 MS
EKG QTC CALCULATION (BAZETT): 514 MS
EKG R AXIS: 11 DEGREES
EKG R AXIS: 5 DEGREES
EKG T AXIS: 66 DEGREES
EKG T AXIS: 74 DEGREES
EKG VENTRICULAR RATE: 85 BPM
EKG VENTRICULAR RATE: 93 BPM
ERYTHROCYTE [DISTWIDTH] IN BLOOD BY AUTOMATED COUNT: 15.2 % (ref 11.5–14.5)
GFR SERPL CREATININE-BSD FRML MDRD: 22 ML/MIN/1.73M2
GFR SERPL CREATININE-BSD FRML MDRD: 23 ML/MIN/1.73M2
GLUCOSE BLD STRIP.AUTO-MCNC: 184 MG/DL (ref 70–108)
GLUCOSE BLD STRIP.AUTO-MCNC: 205 MG/DL (ref 70–108)
GLUCOSE BLD STRIP.AUTO-MCNC: 253 MG/DL (ref 70–108)
GLUCOSE BLD STRIP.AUTO-MCNC: 256 MG/DL (ref 70–108)
GLUCOSE SERPL-MCNC: 199 MG/DL (ref 70–108)
GLUCOSE SERPL-MCNC: 235 MG/DL (ref 70–108)
HCO3 BLDA-SCNC: 20 MMOL/L (ref 23–28)
HCT VFR BLD AUTO: 30.4 % (ref 42–52)
HGB BLD-MCNC: 9.3 GM/DL (ref 14–18)
INR PPP: 3.38 (ref 0.85–1.13)
MAGNESIUM SERPL-MCNC: 2.5 MG/DL (ref 1.6–2.4)
MCH RBC QN AUTO: 29.7 PG (ref 26–33)
MCHC RBC AUTO-ENTMCNC: 30.6 GM/DL (ref 32.2–35.5)
MCV RBC AUTO: 97.1 FL (ref 80–94)
PCO2 BLDA: 34 MMHG (ref 35–45)
PH BLDA: 7.38 [PH] (ref 7.35–7.45)
PLATELET # BLD AUTO: 122 THOU/MM3 (ref 130–400)
PMV BLD AUTO: 10.4 FL (ref 9.4–12.4)
PO2 BLDA: 78 MMHG (ref 71–104)
POTASSIUM SERPL-SCNC: 4.2 MEQ/L (ref 3.5–5.2)
POTASSIUM SERPL-SCNC: 4.9 MEQ/L (ref 3.5–5.2)
PROT SERPL-MCNC: 6 G/DL (ref 6.1–8)
RBC # BLD AUTO: 3.13 MILL/MM3 (ref 4.7–6.1)
SAO2 % BLDA: 95 %
SODIUM SERPL-SCNC: 136 MEQ/L (ref 135–145)
SODIUM SERPL-SCNC: 138 MEQ/L (ref 135–145)
WBC # BLD AUTO: 8.4 THOU/MM3 (ref 4.8–10.8)

## 2024-01-03 PROCEDURE — 6370000000 HC RX 637 (ALT 250 FOR IP): Performed by: UROLOGY

## 2024-01-03 PROCEDURE — 80053 COMPREHEN METABOLIC PANEL: CPT

## 2024-01-03 PROCEDURE — 82948 REAGENT STRIP/BLOOD GLUCOSE: CPT

## 2024-01-03 PROCEDURE — 82803 BLOOD GASES ANY COMBINATION: CPT

## 2024-01-03 PROCEDURE — 2580000003 HC RX 258

## 2024-01-03 PROCEDURE — 99232 SBSQ HOSP IP/OBS MODERATE 35: CPT | Performed by: PHYSICIAN ASSISTANT

## 2024-01-03 PROCEDURE — G0378 HOSPITAL OBSERVATION PER HR: HCPCS

## 2024-01-03 PROCEDURE — 51702 INSERT TEMP BLADDER CATH: CPT

## 2024-01-03 PROCEDURE — 6370000000 HC RX 637 (ALT 250 FOR IP): Performed by: PHYSICIAN ASSISTANT

## 2024-01-03 PROCEDURE — 36415 COLL VENOUS BLD VENIPUNCTURE: CPT

## 2024-01-03 PROCEDURE — 6370000000 HC RX 637 (ALT 250 FOR IP): Performed by: NURSE PRACTITIONER

## 2024-01-03 PROCEDURE — 87086 URINE CULTURE/COLONY COUNT: CPT

## 2024-01-03 PROCEDURE — 99231 SBSQ HOSP IP/OBS SF/LOW 25: CPT | Performed by: UROLOGY

## 2024-01-03 PROCEDURE — 96376 TX/PRO/DX INJ SAME DRUG ADON: CPT

## 2024-01-03 PROCEDURE — 51798 US URINE CAPACITY MEASURE: CPT

## 2024-01-03 PROCEDURE — 82330 ASSAY OF CALCIUM: CPT

## 2024-01-03 PROCEDURE — 36600 WITHDRAWAL OF ARTERIAL BLOOD: CPT

## 2024-01-03 PROCEDURE — 93010 ELECTROCARDIOGRAM REPORT: CPT | Performed by: INTERNAL MEDICINE

## 2024-01-03 PROCEDURE — 85027 COMPLETE CBC AUTOMATED: CPT

## 2024-01-03 PROCEDURE — 96366 THER/PROPH/DIAG IV INF ADDON: CPT

## 2024-01-03 PROCEDURE — 93005 ELECTROCARDIOGRAM TRACING: CPT

## 2024-01-03 PROCEDURE — 2500000003 HC RX 250 WO HCPCS: Performed by: PHYSICIAN ASSISTANT

## 2024-01-03 PROCEDURE — 83735 ASSAY OF MAGNESIUM: CPT

## 2024-01-03 PROCEDURE — 85610 PROTHROMBIN TIME: CPT

## 2024-01-03 PROCEDURE — 6370000000 HC RX 637 (ALT 250 FOR IP)

## 2024-01-03 RX ORDER — METOPROLOL SUCCINATE 100 MG/1
100 TABLET, EXTENDED RELEASE ORAL 2 TIMES DAILY
Status: DISCONTINUED | OUTPATIENT
Start: 2024-01-03 | End: 2024-01-05 | Stop reason: HOSPADM

## 2024-01-03 RX ORDER — AMIODARONE HYDROCHLORIDE 200 MG/1
200 TABLET ORAL DAILY
Status: DISCONTINUED | OUTPATIENT
Start: 2024-01-03 | End: 2024-01-05 | Stop reason: HOSPADM

## 2024-01-03 RX ORDER — LORAZEPAM 1 MG/1
1 TABLET ORAL ONCE
Status: COMPLETED | OUTPATIENT
Start: 2024-01-03 | End: 2024-01-03

## 2024-01-03 RX ORDER — TAMSULOSIN HYDROCHLORIDE 0.4 MG/1
0.4 CAPSULE ORAL DAILY
Status: DISCONTINUED | OUTPATIENT
Start: 2024-01-03 | End: 2024-01-03

## 2024-01-03 RX ADMIN — OXYCODONE 5 MG: 5 TABLET ORAL at 20:18

## 2024-01-03 RX ADMIN — Medication 100 MG: at 10:29

## 2024-01-03 RX ADMIN — LORAZEPAM 1 MG: 1 TABLET ORAL at 05:48

## 2024-01-03 RX ADMIN — ALPRAZOLAM 0.5 MG: 0.5 TABLET ORAL at 20:18

## 2024-01-03 RX ADMIN — MEXILETINE HYDROCHLORIDE 300 MG: 150 CAPSULE ORAL at 13:49

## 2024-01-03 RX ADMIN — MEXILETINE HYDROCHLORIDE 300 MG: 150 CAPSULE ORAL at 10:29

## 2024-01-03 RX ADMIN — METOPROLOL SUCCINATE 100 MG: 100 TABLET, EXTENDED RELEASE ORAL at 10:30

## 2024-01-03 RX ADMIN — FOLIC ACID 1 MG: 1 TABLET ORAL at 10:29

## 2024-01-03 RX ADMIN — BUMETANIDE 2 MG: 1 TABLET ORAL at 10:30

## 2024-01-03 RX ADMIN — HYDRALAZINE HYDROCHLORIDE 50 MG: 50 TABLET, FILM COATED ORAL at 10:29

## 2024-01-03 RX ADMIN — TICAGRELOR 90 MG: 90 TABLET ORAL at 10:30

## 2024-01-03 RX ADMIN — DOXAZOSIN MESYLATE 2 MG: 2 TABLET ORAL at 10:30

## 2024-01-03 RX ADMIN — ATORVASTATIN CALCIUM 40 MG: 40 TABLET, FILM COATED ORAL at 20:19

## 2024-01-03 RX ADMIN — AMIODARONE HYDROCHLORIDE 0.5 MG/MIN: 1.8 INJECTION, SOLUTION INTRAVENOUS at 21:41

## 2024-01-03 RX ADMIN — TAMSULOSIN HYDROCHLORIDE 0.4 MG: 0.4 CAPSULE ORAL at 10:32

## 2024-01-03 RX ADMIN — HYDRALAZINE HYDROCHLORIDE 50 MG: 50 TABLET, FILM COATED ORAL at 20:18

## 2024-01-03 RX ADMIN — ISOSORBIDE MONONITRATE 120 MG: 60 TABLET, EXTENDED RELEASE ORAL at 10:30

## 2024-01-03 RX ADMIN — MEXILETINE HYDROCHLORIDE 300 MG: 150 CAPSULE ORAL at 20:17

## 2024-01-03 RX ADMIN — AMIODARONE HYDROCHLORIDE 200 MG: 200 TABLET ORAL at 13:49

## 2024-01-03 RX ADMIN — TICAGRELOR 90 MG: 90 TABLET ORAL at 20:23

## 2024-01-03 RX ADMIN — HYDRALAZINE HYDROCHLORIDE 50 MG: 50 TABLET, FILM COATED ORAL at 13:49

## 2024-01-03 RX ADMIN — METOPROLOL SUCCINATE 100 MG: 100 TABLET, EXTENDED RELEASE ORAL at 20:18

## 2024-01-03 RX ADMIN — INSULIN GLARGINE 10 UNITS: 100 INJECTION, SOLUTION SUBCUTANEOUS at 20:21

## 2024-01-03 RX ADMIN — SODIUM CHLORIDE, PRESERVATIVE FREE 10 ML: 5 INJECTION INTRAVENOUS at 20:22

## 2024-01-03 RX ADMIN — AMIODARONE HYDROCHLORIDE 1 MG/MIN: 1.8 INJECTION, SOLUTION INTRAVENOUS at 15:38

## 2024-01-03 RX ADMIN — OXYCODONE 5 MG: 5 TABLET ORAL at 10:29

## 2024-01-03 RX ADMIN — LORAZEPAM 1 MG: 1 TABLET ORAL at 10:29

## 2024-01-03 RX ADMIN — LORAZEPAM 1 MG: 1 TABLET ORAL at 03:36

## 2024-01-03 RX ADMIN — Medication 1 TABLET: at 10:30

## 2024-01-03 ASSESSMENT — PAIN DESCRIPTION - DESCRIPTORS: DESCRIPTORS: ACHING

## 2024-01-03 ASSESSMENT — PATIENT HEALTH QUESTIONNAIRE - PHQ9
SUM OF ALL RESPONSES TO PHQ QUESTIONS 1-9: 0
SUM OF ALL RESPONSES TO PHQ9 QUESTIONS 1 & 2: 0
2. FEELING DOWN, DEPRESSED OR HOPELESS: 0
SUM OF ALL RESPONSES TO PHQ QUESTIONS 1-9: 0
SUM OF ALL RESPONSES TO PHQ QUESTIONS 1-9: 0
1. LITTLE INTEREST OR PLEASURE IN DOING THINGS: 0
SUM OF ALL RESPONSES TO PHQ QUESTIONS 1-9: 0

## 2024-01-03 ASSESSMENT — PAIN DESCRIPTION - LOCATION: LOCATION: BACK

## 2024-01-03 ASSESSMENT — LIFESTYLE VARIABLES
HOW OFTEN DO YOU HAVE A DRINK CONTAINING ALCOHOL: 4 OR MORE TIMES A WEEK
HOW MANY STANDARD DRINKS CONTAINING ALCOHOL DO YOU HAVE ON A TYPICAL DAY: 5 OR 6

## 2024-01-03 ASSESSMENT — PAIN SCALES - GENERAL
PAINLEVEL_OUTOF10: 5
PAINLEVEL_OUTOF10: 7

## 2024-01-03 ASSESSMENT — PAIN - FUNCTIONAL ASSESSMENT: PAIN_FUNCTIONAL_ASSESSMENT: ACTIVITIES ARE NOT PREVENTED

## 2024-01-03 ASSESSMENT — PAIN DESCRIPTION - PAIN TYPE: TYPE: ACUTE PAIN

## 2024-01-03 ASSESSMENT — PAIN DESCRIPTION - ONSET: ONSET: ON-GOING

## 2024-01-03 ASSESSMENT — PAIN DESCRIPTION - FREQUENCY: FREQUENCY: INTERMITTENT

## 2024-01-03 ASSESSMENT — PAIN DESCRIPTION - ORIENTATION: ORIENTATION: LOWER

## 2024-01-03 NOTE — PROCEDURES
Bladder scan completed at 1440 and results told to Domi RIVER. Scan showed 23 mL in the patients bladder. Last void was 20 min prior to scan.

## 2024-01-03 NOTE — PROGRESS NOTES
Warfarin Pharmacy Consult Note    Garrett Duncan is a 58 y.o. male for whom pharmacy has been asked to manage warfarin therapy.     Consulting Provider: Lou Reina  Warfarin Indication: Atrial fibrillation or Atrial flutter  Target INR range: 2.0-3.0   Warfarin dose prior to admission: TBD  Outpatient warfarin provider: Gretchen Carter    Recent Labs     01/02/24  1214 01/03/24  1209   INR 4.01* 3.38*     Recent Labs     01/02/24  0115 01/03/24  0350   HGB 10.9* 9.3*    122*     Concurrent anticoagulants/antiplatelets: ticagrelor   Significant warfarin drug-drug interactions: amiodarone (new), acetaminophen,     Date INR Warfarin Dose   01/02/24 4.01 No warfarin    01/03/24 3.38 No warfarin                              INR will be monitored routinely until therapeutic INR is achieved.    Pharmacy will continue to follow. Thank you for the consult,   Darline Rosario, PharmD  PGY1 Resident

## 2024-01-03 NOTE — CONSULTS
Brief Intervention and Referral to Treatment Summary    Patient was provided PHQ-9, AUDIT-C and DAST Screening:      PHQ-9 Score: 0  AUDIT-C Score:  9  DAST Score:  0    Patient’s substance use is considered     Harmful      Patient’s depression is considered:     Minimal    Brief Education Was Provided    Patient was not receptive      Brief Intervention Is Provided (Only for AUDIT-C or DAST)     Patient reports readiness to decrease and/or stop use and a plan was discussed.      Recommendations/Referrals for Brief and/or Specialized Treatment Provided to Patient:      Patient denies wanting a resource packet, states that he \"knows where all of the meetings are, I just have to go to them\".

## 2024-01-03 NOTE — PROGRESS NOTES
1436- Arrived to patient room to complete AOD consult. Patient sleeping at this time. Advised by RN not to wake. MAAME to re-attempt.

## 2024-01-03 NOTE — PLAN OF CARE
Progressing  Flowsheets (Taken 1/3/2024 0510)  Care Plan - Patient's Chronic Conditions and Co-Morbidity Symptoms are Monitored and Maintained or Improved:   Monitor and assess patient's chronic conditions and comorbid symptoms for stability, deterioration, or improvement   Collaborate with multidisciplinary team to address chronic and comorbid conditions and prevent exacerbation or deterioration   Update acute care plan with appropriate goals if chronic or comorbid symptoms are exacerbated and prevent overall improvement and discharge     Problem: ABCDS Injury Assessment  Goal: Absence of physical injury  Outcome: Progressing  Flowsheets (Taken 1/3/2024 0510)  Absence of Physical Injury: Implement safety measures based on patient assessment   Care plan reviewed with patient.  Patient verbalizes understanding of the care plan and contributed to goal setting.

## 2024-01-03 NOTE — PROGRESS NOTES
Cardiology Progress Note      Patient:  Garrett Duncan  YOB: 1965  MRN: [id number]   Acct: [id number]  Admit Date:  1/2/2024  Primary Cardiologist: Fany Guzmán MD    Note per dr alonso \"Consulted for: Short runs of ventricular tachycardia, PVCs        HPI: This is a pleasant 58 y.o. male presenting with acute alcohol intoxication, signs of withdrawal.  His medical history includes paroxysmal atrial fibrillation s/p ablation, known ventricular tachycardia s/p ablation, CAD s/p CABG s/p ICD, HFrEF, HTN, T2DM, CKD, GERD, alcohol abuse.  Patient endorsing anxiety, is tremulous during review.  He denies chest pain, nausea.  He says that he has been not been taking any medications for the past week, has been only drinking and has not been eating.  This is a patient well known to the cardiology service.\"    Subjective (Events in last 24 hours): pt awake and alert.  Restless.  Poor historian.  NAD.  Denies cp and sob.  Says he doesn't feel good.    On RA  On amio gtt      Objective:   BP (!) 152/62   Pulse 60   Temp 98.1 °F (36.7 °C) (Oral)   Resp 18   Ht 1.88 m (6' 2\")   Wt 123.3 kg (271 lb 13.2 oz)   SpO2 95%   BMI 34.90 kg/m²        TELEMETRY: nsr with frequent PVCs, some couplets, 3 beats NSVT    Physical Exam:  General Appearance: awake and alert.  Restless.  Sitting at edge of bed.  Poor historian  Cardiovascular: normal rate, regular rhythm, normal S1 and S2, no murmurs, rubs, clicks, or gallops, distal pulses intact, no carotid bruits, no JVD  Pulmonary/Chest: clear to auscultation bilaterally- no wheezes, rales or rhonchi, normal air movement, no respiratory distress  Abdomen: soft, non-tender, non-distended, normal bowel sounds, no masses Extremities: no cyanosis, clubbing or edema, pulse   Skin: warm and dry, no rash or erythema  Head: normocephalic and atraumatic  Eyes: pupils equal, round, and reactive to light  Neck: supple and non-tender without mass, no thyromegaly     Medications:

## 2024-01-03 NOTE — PROGRESS NOTES
Hospitalist Progress Note      Patient:  Garrett Duncan    Unit/Bed:3B-31/031-A  YOB: 1965  MRN: [id number]   Acct: [id number]   PCP: Leonel Adorno APRN - CNP  Date of Admission: 1/2/2024    Assessment/Plan:    NSVT: Cardiology following and managing. Increased Toprol XL to 100mg BID. Amio drip was replaced to PO 200mg QD. Pt does have a AICD. Tele. Pacer pads in place.   - HR was up in the 120s with sustained V tach during this afternoon. Cardiology aware.   Alcohol Abuse: Patient refuses phenobarbital protocol.  Ativan p.o. ordered.  Continue to monitor. Pt is confused but stable.   Atrial Fibrillation, permanent: Pt is on OAC, unsure on compliance with this or any medications. Coumadin restarted with urology approval. Resume lopressor and mexiletine   HFrEF: 2/2 dilated cardiomyopathy. Echo 11/22 showed EF 30%, repeat also shows 30%. Pt refused Lifevest. Continue home medications. There is questionable compliance.   Urinary Retention: Urology consulted. Mason placed. Pt had hematuria likely from trauma. Urology okay with OAC.   HAGMA: Likely 2/2 alcohol abuse & CKD. CO2 17. BMP in the AM.   Intractable back pain with known spinal stenosis and schwannoma: Continue Oxy as an IP.   IDDMII: BS ~200. Continue insulin regimen.   CKD, Stage 4: Creatinine at baseline ~3. BMP in the AM.   Non-compliance: patient remains non-compliant with medications while at home and has had multiple admissions.     Chief Complaint: Depression     Initial H and P:-    Initial H&P: \"Garrett Duncan is a 58 y.o. male with PMHx of Paroxysmal A-fib, alcohol abuse, CHF, DMII, hypertension who presents to [facility] with depression.  Patient was not a very good historian during interview, which may be secondary to acute alcohol intoxication. Patient states that he has been drinking heavily over the last week due to his depression. He states

## 2024-01-03 NOTE — PROGRESS NOTES
Urology Progress Note    Reason for Consult:  urinary retention  Requesting Physician:  medicine     History Obtained From:  patient, electronic medical record     Chief Complaint: depression    Subjective: \"    Patient is resting in bed, voiding yellow urine via pringle, +flatus, -BM, ambulating with assistance, tolerating regular diet, denies any nausea or vomiting. There are complaints of controlled pain at this time.    Pringle placed overnight  1L out per nurse  Cont pringle at this time  Cont Cardura    IMPRESSION/Plan:    Ok to eat from URO standpoint  Ok to restart anticoagulants from URO standpoint     Urinary retention - unable to void, 16fr coude placed by nurse overnight cont Cardura, start Flomax, send Ux. ?NGB  CKD - crt at baseline, cont pringle, no hydronephrosis on CT last month  Alcohol abuse - likely contributing to NGB, medicine managin  Cardiac arrhythmia - managed by medicine, cards on board  Afib on AOC - managed by medicine     Will follow       Vitals:  BP (!) 164/83   Pulse 69   Temp 98.2 °F (36.8 °C) (Oral)   Resp 18   Ht 1.88 m (6' 2\")   Wt 123.3 kg (271 lb 13.2 oz)   SpO2 98%   BMI 34.90 kg/m²   Temp  Av.2 °F (36.8 °C)  Min: 97.5 °F (36.4 °C)  Max: 98.6 °F (37 °C)    Intake/Output Summary (Last 24 hours) at 1/3/2024 0727  Last data filed at 1/3/2024 0601  Gross per 24 hour   Intake 685.28 ml   Output 2650 ml   Net -1964.72 ml       Social History     Socioeconomic History    Marital status:      Spouse name: Not on file    Number of children: 3    Years of education: 12    Highest education level: High school graduate   Occupational History     Employer: [employer]   Tobacco Use    Smoking status: Every Day     Current packs/day: 0.50     Average packs/day: 0.5 packs/day for 43.5 years (21.7 ttl pk-yrs)     Types: Cigarettes     Start date: 1980    Smokeless tobacco: Former     Types: Snuff     Quit date: 3/31/2023   Vaping Use    Vaping Use: Never used

## 2024-01-04 LAB
ANION GAP SERPL CALC-SCNC: 15 MEQ/L (ref 8–16)
BACTERIA UR CULT: ABNORMAL
BUN SERPL-MCNC: 48 MG/DL (ref 7–22)
CA-I BLD ISE-SCNC: 1.15 MMOL/L (ref 1.12–1.32)
CALCIUM SERPL-MCNC: 8.5 MG/DL (ref 8.5–10.5)
CHLORIDE SERPL-SCNC: 104 MEQ/L (ref 98–111)
CO2 SERPL-SCNC: 19 MEQ/L (ref 23–33)
CREAT SERPL-MCNC: 3 MG/DL (ref 0.4–1.2)
DEPRECATED RDW RBC AUTO: 52.6 FL (ref 35–45)
EKG ATRIAL RATE: 67 BPM
EKG P AXIS: 40 DEGREES
EKG P-R INTERVAL: 188 MS
EKG Q-T INTERVAL: 436 MS
EKG QRS DURATION: 96 MS
EKG QTC CALCULATION (BAZETT): 460 MS
EKG R AXIS: 10 DEGREES
EKG T AXIS: 84 DEGREES
EKG VENTRICULAR RATE: 67 BPM
ERYTHROCYTE [DISTWIDTH] IN BLOOD BY AUTOMATED COUNT: 15.4 % (ref 11.5–14.5)
GFR SERPL CREATININE-BSD FRML MDRD: 23 ML/MIN/1.73M2
GLUCOSE BLD STRIP.AUTO-MCNC: 122 MG/DL (ref 70–108)
GLUCOSE BLD STRIP.AUTO-MCNC: 187 MG/DL (ref 70–108)
GLUCOSE BLD STRIP.AUTO-MCNC: 253 MG/DL (ref 70–108)
GLUCOSE BLD STRIP.AUTO-MCNC: 86 MG/DL (ref 70–108)
GLUCOSE SERPL-MCNC: 115 MG/DL (ref 70–108)
HCT VFR BLD AUTO: 29.8 % (ref 42–52)
HGB BLD-MCNC: 9.1 GM/DL (ref 14–18)
INR PPP: 2.68 (ref 0.85–1.13)
MAGNESIUM SERPL-MCNC: 2.2 MG/DL (ref 1.6–2.4)
MCH RBC QN AUTO: 29.7 PG (ref 26–33)
MCHC RBC AUTO-ENTMCNC: 30.5 GM/DL (ref 32.2–35.5)
MCV RBC AUTO: 97.4 FL (ref 80–94)
ORGANISM: ABNORMAL
PLATELET # BLD AUTO: 105 THOU/MM3 (ref 130–400)
PMV BLD AUTO: 10.7 FL (ref 9.4–12.4)
POTASSIUM SERPL-SCNC: 3.8 MEQ/L (ref 3.5–5.2)
RBC # BLD AUTO: 3.06 MILL/MM3 (ref 4.7–6.1)
SODIUM SERPL-SCNC: 138 MEQ/L (ref 135–145)
WBC # BLD AUTO: 6.9 THOU/MM3 (ref 4.8–10.8)

## 2024-01-04 PROCEDURE — 2500000003 HC RX 250 WO HCPCS: Performed by: PHYSICIAN ASSISTANT

## 2024-01-04 PROCEDURE — 2140000000 HC CCU INTERMEDIATE R&B

## 2024-01-04 PROCEDURE — 99232 SBSQ HOSP IP/OBS MODERATE 35: CPT | Performed by: PHYSICIAN ASSISTANT

## 2024-01-04 PROCEDURE — 80048 BASIC METABOLIC PNL TOTAL CA: CPT

## 2024-01-04 PROCEDURE — 6370000000 HC RX 637 (ALT 250 FOR IP): Performed by: PHARMACIST

## 2024-01-04 PROCEDURE — 85610 PROTHROMBIN TIME: CPT

## 2024-01-04 PROCEDURE — 85027 COMPLETE CBC AUTOMATED: CPT

## 2024-01-04 PROCEDURE — 82330 ASSAY OF CALCIUM: CPT

## 2024-01-04 PROCEDURE — 93005 ELECTROCARDIOGRAM TRACING: CPT | Performed by: PHYSICIAN ASSISTANT

## 2024-01-04 PROCEDURE — 94660 CPAP INITIATION&MGMT: CPT

## 2024-01-04 PROCEDURE — 6370000000 HC RX 637 (ALT 250 FOR IP): Performed by: NURSE PRACTITIONER

## 2024-01-04 PROCEDURE — 6370000000 HC RX 637 (ALT 250 FOR IP): Performed by: PHYSICIAN ASSISTANT

## 2024-01-04 PROCEDURE — 36415 COLL VENOUS BLD VENIPUNCTURE: CPT

## 2024-01-04 PROCEDURE — 5A09357 ASSISTANCE WITH RESPIRATORY VENTILATION, LESS THAN 24 CONSECUTIVE HOURS, CONTINUOUS POSITIVE AIRWAY PRESSURE: ICD-10-PCS | Performed by: PHYSICIAN ASSISTANT

## 2024-01-04 PROCEDURE — 99231 SBSQ HOSP IP/OBS SF/LOW 25: CPT | Performed by: UROLOGY

## 2024-01-04 PROCEDURE — 82948 REAGENT STRIP/BLOOD GLUCOSE: CPT

## 2024-01-04 PROCEDURE — 6370000000 HC RX 637 (ALT 250 FOR IP)

## 2024-01-04 PROCEDURE — 96366 THER/PROPH/DIAG IV INF ADDON: CPT

## 2024-01-04 PROCEDURE — 94761 N-INVAS EAR/PLS OXIMETRY MLT: CPT

## 2024-01-04 PROCEDURE — 2580000003 HC RX 258

## 2024-01-04 PROCEDURE — 83735 ASSAY OF MAGNESIUM: CPT

## 2024-01-04 PROCEDURE — 99222 1ST HOSP IP/OBS MODERATE 55: CPT | Performed by: INTERNAL MEDICINE

## 2024-01-04 PROCEDURE — 93010 ELECTROCARDIOGRAM REPORT: CPT | Performed by: INTERNAL MEDICINE

## 2024-01-04 RX ORDER — POTASSIUM CHLORIDE 20 MEQ/1
40 TABLET, EXTENDED RELEASE ORAL ONCE
Status: COMPLETED | OUTPATIENT
Start: 2024-01-04 | End: 2024-01-04

## 2024-01-04 RX ORDER — WARFARIN SODIUM 2.5 MG/1
2.5 TABLET ORAL
Status: COMPLETED | OUTPATIENT
Start: 2024-01-04 | End: 2024-01-04

## 2024-01-04 RX ADMIN — SODIUM CHLORIDE, PRESERVATIVE FREE 10 ML: 5 INJECTION INTRAVENOUS at 20:02

## 2024-01-04 RX ADMIN — HYDRALAZINE HYDROCHLORIDE 50 MG: 50 TABLET, FILM COATED ORAL at 15:49

## 2024-01-04 RX ADMIN — LORAZEPAM 1 MG: 1 TABLET ORAL at 00:33

## 2024-01-04 RX ADMIN — DOXAZOSIN MESYLATE 2 MG: 2 TABLET ORAL at 08:32

## 2024-01-04 RX ADMIN — MEXILETINE HYDROCHLORIDE 300 MG: 150 CAPSULE ORAL at 20:01

## 2024-01-04 RX ADMIN — TICAGRELOR 90 MG: 90 TABLET ORAL at 20:01

## 2024-01-04 RX ADMIN — MEXILETINE HYDROCHLORIDE 300 MG: 150 CAPSULE ORAL at 15:49

## 2024-01-04 RX ADMIN — ALPRAZOLAM 0.5 MG: 0.5 TABLET ORAL at 20:01

## 2024-01-04 RX ADMIN — OXYCODONE 5 MG: 5 TABLET ORAL at 05:45

## 2024-01-04 RX ADMIN — ISOSORBIDE MONONITRATE 120 MG: 60 TABLET, EXTENDED RELEASE ORAL at 08:33

## 2024-01-04 RX ADMIN — HYDRALAZINE HYDROCHLORIDE 50 MG: 50 TABLET, FILM COATED ORAL at 20:01

## 2024-01-04 RX ADMIN — ATORVASTATIN CALCIUM 40 MG: 40 TABLET, FILM COATED ORAL at 20:01

## 2024-01-04 RX ADMIN — WARFARIN SODIUM 2.5 MG: 2.5 TABLET ORAL at 18:36

## 2024-01-04 RX ADMIN — Medication 100 MG: at 08:33

## 2024-01-04 RX ADMIN — Medication 1 TABLET: at 08:32

## 2024-01-04 RX ADMIN — POTASSIUM CHLORIDE 40 MEQ: 1500 TABLET, EXTENDED RELEASE ORAL at 15:52

## 2024-01-04 RX ADMIN — BUMETANIDE 2 MG: 1 TABLET ORAL at 08:32

## 2024-01-04 RX ADMIN — MEXILETINE HYDROCHLORIDE 300 MG: 150 CAPSULE ORAL at 08:33

## 2024-01-04 RX ADMIN — AMIODARONE HYDROCHLORIDE 0.5 MG/MIN: 1.8 INJECTION, SOLUTION INTRAVENOUS at 22:41

## 2024-01-04 RX ADMIN — TICAGRELOR 90 MG: 90 TABLET ORAL at 08:33

## 2024-01-04 RX ADMIN — LORAZEPAM 1 MG: 1 TABLET ORAL at 05:43

## 2024-01-04 RX ADMIN — AMIODARONE HYDROCHLORIDE 0.5 MG/MIN: 1.8 INJECTION, SOLUTION INTRAVENOUS at 10:06

## 2024-01-04 RX ADMIN — AMIODARONE HYDROCHLORIDE 200 MG: 200 TABLET ORAL at 10:31

## 2024-01-04 RX ADMIN — OXYCODONE 5 MG: 5 TABLET ORAL at 20:23

## 2024-01-04 RX ADMIN — SODIUM CHLORIDE, PRESERVATIVE FREE 10 ML: 5 INJECTION INTRAVENOUS at 08:33

## 2024-01-04 RX ADMIN — FOLIC ACID 1 MG: 1 TABLET ORAL at 08:32

## 2024-01-04 RX ADMIN — INSULIN GLARGINE 10 UNITS: 100 INJECTION, SOLUTION SUBCUTANEOUS at 20:01

## 2024-01-04 RX ADMIN — OXYCODONE 5 MG: 5 TABLET ORAL at 10:34

## 2024-01-04 RX ADMIN — HYDRALAZINE HYDROCHLORIDE 50 MG: 50 TABLET, FILM COATED ORAL at 08:32

## 2024-01-04 RX ADMIN — METOPROLOL SUCCINATE 100 MG: 100 TABLET, EXTENDED RELEASE ORAL at 08:32

## 2024-01-04 ASSESSMENT — PAIN - FUNCTIONAL ASSESSMENT
PAIN_FUNCTIONAL_ASSESSMENT: ACTIVITIES ARE NOT PREVENTED
PAIN_FUNCTIONAL_ASSESSMENT: ACTIVITIES ARE NOT PREVENTED
PAIN_FUNCTIONAL_ASSESSMENT: PREVENTS OR INTERFERES SOME ACTIVE ACTIVITIES AND ADLS

## 2024-01-04 ASSESSMENT — PAIN DESCRIPTION - ORIENTATION
ORIENTATION: LEFT
ORIENTATION: LOWER
ORIENTATION: LOWER

## 2024-01-04 ASSESSMENT — PAIN SCALES - GENERAL
PAINLEVEL_OUTOF10: 8
PAINLEVEL_OUTOF10: 8
PAINLEVEL_OUTOF10: 9
PAINLEVEL_OUTOF10: 5
PAINLEVEL_OUTOF10: 5

## 2024-01-04 ASSESSMENT — PAIN DESCRIPTION - DESCRIPTORS
DESCRIPTORS: ACHING
DESCRIPTORS: ACHING;BURNING
DESCRIPTORS: ACHING

## 2024-01-04 ASSESSMENT — PAIN DESCRIPTION - ONSET
ONSET: ON-GOING
ONSET: ON-GOING

## 2024-01-04 ASSESSMENT — PAIN DESCRIPTION - PAIN TYPE
TYPE: ACUTE PAIN
TYPE: ACUTE PAIN

## 2024-01-04 ASSESSMENT — PAIN DESCRIPTION - FREQUENCY
FREQUENCY: INTERMITTENT
FREQUENCY: INTERMITTENT

## 2024-01-04 ASSESSMENT — PAIN DESCRIPTION - LOCATION
LOCATION: BACK

## 2024-01-04 NOTE — PROGRESS NOTES
Urology Progress Note    Reason for Consult:  urinary retention  Requesting Physician:  medicine     History Obtained From:  patient, electronic medical record     Chief Complaint: depression    Subjective: \"    Patient is resting in bed, voiding yellow urine via pringle, +flatus, -BM, ambulating with assistance, tolerating regular diet, denies any nausea or vomiting. There are complaints of controlled pain at this time.    Pt sleeping - did not disturb  Pringle draining clear yellow urine  Cont pringle at this time  Cont Cardura, consider increasing Cardura - cards on board  Flomax stopped    IMPRESSION/Plan:    Ok to eat from URO standpoint  Ok for anticoagulants from URO standpoint     Urinary retention - unable to void, 16fr coude placed by nurse overnight cont Cardura, Ux pending. ?NGB  CKD - crt at baseline, cont pringle, no hydronephrosis on CT last month  Alcohol abuse - likely contributing to NGB, medicine managin  Cardiac arrhythmia - managed by medicine, cards on board  Afib on AOC - managed by medicine     URO to follow peripherally, call with questions  Will arrange f/u  Consider VT at discharge       Vitals:  BP (!) 143/75   Pulse 59   Temp 98.1 °F (36.7 °C) (Oral)   Resp 18   Ht 1.88 m (6' 2\")   Wt 123.3 kg (271 lb 13.2 oz)   SpO2 98%   BMI 34.90 kg/m²   Temp  Av.1 °F (36.7 °C)  Min: 97.6 °F (36.4 °C)  Max: 98.9 °F (37.2 °C)    Intake/Output Summary (Last 24 hours) at 2024 1009  Last data filed at 2024 0439  Gross per 24 hour   Intake 260 ml   Output 2650 ml   Net -2390 ml         Social History     Socioeconomic History    Marital status:      Spouse name: Not on file    Number of children: 3    Years of education: 12    Highest education level: High school graduate   Occupational History     Employer: [employer]   Tobacco Use    Smoking status: Every Day     Current packs/day: 0.50     Average packs/day: 0.5 packs/day for 43.5 years (21.7 ttl pk-yrs)     Types:

## 2024-01-04 NOTE — ED PROVIDER NOTES
V-tach ([facility])     s/p ablation in dec 2014       SURGICALHISTORY      has a past surgical history that includes Cardiac catheterization (03/20/2014); Coronary artery bypass graft (05/09/2014); other surgical history (Left, 10/21/2014); EKG 12 Lead (07/17/2015); Cardiac defibrillator placement (10/13/2015); vascular surgery; pacemaker placement; pr laminectomy w/o ffd > 2 vert seg lumbar (N/A, 01/16/2018); and Cardiac electrophysiology study and ablation.    CURRENT MEDICATIONS       Current Discharge Medication List        CONTINUE these medications which have NOT CHANGED    Details   mexiletine (MEXITIL) 150 MG capsule Take 2 capsules by mouth 3 times daily  Qty: 180 capsule, Refills: 3      ALPRAZolam (XANAX) 0.5 MG tablet TAKE 1 TABLET BY MOUTH AT BEDTIME FOR ANXIETY  Qty: 30 tablet, Refills: 5    Associated Diagnoses: Other mixed anxiety disorders      hydrALAZINE (APRESOLINE) 50 MG tablet Take 1 tablet by mouth 3 times daily  Qty: 90 tablet, Refills: 1    Associated Diagnoses: Essential hypertension      potassium chloride (KLOR-CON M) 20 MEQ extended release tablet Take 1 tablet by mouth daily  Qty: 90 tablet, Refills: 3      linagliptin (TRADJENTA) 5 MG tablet TAKE 1 TABLET BY MOUTH DAILY  Qty: 90 tablet, Refills: 3    Associated Diagnoses: Controlled type 2 diabetes mellitus with chronic kidney disease, without long-term current use of insulin, unspecified CKD stage ([facility])      ticagrelor (BRILINTA) 90 MG TABS tablet Take 1 tablet by mouth 2 times daily  Qty: 180 tablet, Refills: 3      doxazosin (CARDURA) 2 MG tablet Take 1 tablet by mouth daily  Qty: 90 tablet, Refills: 3      warfarin (COUMADIN) 5 MG tablet Take 1 tablet by mouth daily  Qty: 30 tablet, Refills: 1      glucose 4 g chewable tablet Take 4 tablets by mouth as needed for Low blood sugar  Qty: 60 tablet, Refills: 3      Multiple Vitamin (MULTIVITAMIN) TABS tablet Take 1 tablet by mouth daily  Qty: 30 tablet, Refills: 0      thiamine 100 MG tablet  01/04/24 8:55 AM    Corey Counts, APRN - CNP         Counts, Corey, APRN - CNP  01/04/24 0917

## 2024-01-04 NOTE — PROGRESS NOTES
Cardiology Progress Note      Patient:  Garrett Duncan  YOB: 1965  MRN: [id number]   Acct: [id number]  Admit Date:  1/2/2024  Primary Cardiologist: Fany Guzmán MD    Note per dr alonso \"Consulted for: Short runs of ventricular tachycardia, PVCs        HPI: This is a pleasant 58 y.o. male presenting with acute alcohol intoxication, signs of withdrawal.  His medical history includes paroxysmal atrial fibrillation s/p ablation, known ventricular tachycardia s/p ablation, CAD s/p CABG s/p ICD, HFrEF, HTN, T2DM, CKD, GERD, alcohol abuse.  Patient endorsing anxiety, is tremulous during review.  He denies chest pain, nausea.  He says that he has been not been taking any medications for the past week, has been only drinking and has not been eating.  This is a patient well known to the cardiology service.\"    Subjective (Events in last 24 hours):   Pt awake and alert.  NAD. No cp or sob. No edema or orthopnea.  He wants to go home   On RA  On amio gtt      Objective:   /85   Pulse 62   Temp 98.1 °F (36.7 °C) (Axillary)   Resp 22   Ht 1.88 m (6' 2\")   Wt 123.3 kg (271 lb 13.2 oz)   SpO2 96%   BMI 34.90 kg/m²        TELEMETRY: nsr with frequent PVCs, some couplets, 3 beats NSVT    Physical Exam:  General Appearance: awake and alert.  Restless.  Sitting at edge of bed.  Poor historian  Cardiovascular: normal rate, regular rhythm, normal S1 and S2, no murmurs, rubs, clicks, or gallops, distal pulses intact, no carotid bruits, no JVD  Pulmonary/Chest: clear to auscultation bilaterally- no wheezes, rales or rhonchi, normal air movement, no respiratory distress  Abdomen: soft, non-tender, non-distended, normal bowel sounds, no masses Extremities: no cyanosis, clubbing or edema, pulse   Skin: warm and dry, no rash or erythema  Head: normocephalic and atraumatic  Eyes: pupils equal, round, and reactive to light  Neck: supple and non-tender without mass, no thyromegaly     Medications:    warfarin  2.5

## 2024-01-04 NOTE — PROGRESS NOTES
Hospitalist Progress Note      Patient:  Garrett Duncan    Unit/Bed:3B-31/031-A  YOB: 1965  MRN: [id number]   Acct: [id number]   PCP: Leonel Adorno APRN - CNP  Date of Admission: 1/2/2024    Assessment/Plan:    NSVT: Cardiology following and managing. Increased Toprol XL to 100mg BID. Amio drip restarted. EP consulted.   - Continue Amio drip for 24 hours and the transition to PO for DC. Pt will follow up with EP as an OP.   Alcohol Abuse: Patient refuses phenobarbital protocol.  Ativan p.o. ordered.  Continue to monitor. Pt is less confused today and more conversational.   Atrial Fibrillation, permanent: Pt is on OAC, unsure on compliance with this or any medications. Coumadin restarted with urology approval. Resume lopressor and mexiletine   HFrEF: 2/2 dilated cardiomyopathy. Echo 11/22 showed EF 30%, repeat also shows 30%. Pt refused Lifevest. Continue home medications. There is questionable compliance.   Urinary Retention: Urology consulted. Mason placed. Pt had hematuria likely from trauma. Urology okay with OAC.   HAGMA: Likely 2/2 alcohol abuse & CKD. CO2 17. BMP in the AM.   Intractable back pain with known spinal stenosis and schwannoma: Continue Oxy as an IP.   IDDMII: BS ~200. Continue insulin regimen.   CKD, Stage 4: Creatinine at baseline ~3. BMP in the AM.   Non-compliance: patient remains non-compliant with medications while at home and has had multiple admissions.     Chief Complaint: Depression     Initial H and P:-    Initial H&P: \"Garrett Duncan is a 58 y.o. male with PMHx of Paroxysmal A-fib, alcohol abuse, CHF, DMII, hypertension who presents to [facility] with depression.  Patient was not a very good historian during interview, which may be secondary to acute alcohol intoxication. Patient states that he has been drinking heavily over the last week due to his depression. He states that he has not  01/04/24  0344   INR 4.01* 3.38* 2.68*     No results for input(s): \"CKTOTAL\", \"TROPONINI\" in the last 72 hours.    Microbiology:    Blood culture #1:   Lab Results   Component Value Date/Time    BC No growth-preliminary  No growth   02/03/2019 07:01 AM       Blood culture #2:No results found for: \"BLOODCULT2\"    Organism:  Lab Results   Component Value Date/Time    ORG Growth of Contaminants 01/03/2024 10:44 AM         Lab Results   Component Value Date/Time    LABGRAM  06/30/2017 01:10 PM     No segmented neutrophils observed.  No epithelial cells observed.  No bacteria seen.         MRSA culture only:No results found for: \"MRSAC\"    Urine culture:   Lab Results   Component Value Date/Time    LABURIN  01/03/2024 10:44 AM     No significant pathogens isolated. Growth likely represents skin ittus or distal urethral titus.       Respiratory culture: No results found for: \"CULTRESP\"    Aerobic and Anaerobic :  Lab Results   Component Value Date/Time    LABAERO No growth-preliminary  No growth   06/30/2017 01:10 PM     Lab Results   Component Value Date/Time    LABANAE No growth-preliminary  No growth   06/30/2017 01:10 PM       Urinalysis:      Lab Results   Component Value Date/Time    NITRU NEGATIVE 01/02/2024 04:40 PM    WBCUA 15-25W/CLUMPS 01/02/2024 04:40 PM    BACTERIA NONE SEEN 01/02/2024 04:40 PM    RBCUA > 100 01/02/2024 04:40 PM    BLOODU LARGE 01/02/2024 04:40 PM    SPECGRAV 1.016 01/02/2024 04:40 PM    GLUCOSEU 500 11/29/2023 10:30 PM       Radiology:  XR RIBS LEFT INCLUDE CHEST (MIN 3 VIEWS)   Final Result   1. No acute findings. Chronic moderate-to-severe cardiomegaly and mild    interstitial edema. No evidence of rib fracture.      This document has been electronically signed by: Asaf Clarke MD on    01/02/2024 02:56 AM        Electronically signed by HUGH Dewitt on 1/4/2024 at 2:17 PM

## 2024-01-04 NOTE — CARE COORDINATION
1/4/24, 1:50 PM EST    DISCHARGE ON GOING EVALUATION    [facility] day: 0  Location: 3B-31/031-A Reason for admit: Cardiac arrhythmia [I49.9]  V tach (HCC) [I47.20]  Non compliance w medication regimen [Z91.148]  Acute alcoholic intoxication without complication (HCC) [F10.920]  Contusion of rib on left side, initial encounter [S20.212A]  Stage 5 chronic kidney disease not on chronic dialysis (HCC) [N18.5]  Other ventricular tachycardia (HCC) [I47.29]   Procedure:   1/2 XR Left Ribs: No acute findings. Chronic moderate-to-severe cardiomegaly and mild interstitial edema. No evidence of rib fracture  1/2 ECHO: EF of 30 - 35%.   1/2 Mason placed     Barriers to Discharge: Hospitalist, Urology, Cardiology and EP following. Runs of Vtach. Continue IV amiodarone gtt for 24hrs along with 200mg PO amiodarone. PO bumex. Doxazosin. Hydralazine. Imdur. Ativan prn. Toprol XL. Mexiletine. Roxicodone prn. Brilinta. Coumadin. Mason care. Creat 3.0 (3.0) INR 2.68.     PCP: Leonel Adorno W, APRN - CNP   %  Patient Goals/Plan/Treatment Preferences: Plans to return home alone. Denies needs for DME or HH. Will need ride home at discharge- states daughters are unable to provide transportation.

## 2024-01-04 NOTE — PROGRESS NOTES
Warfarin Pharmacy Consult Note    Warfarin Indication: Atrial fibrillation or Atrial flutter  Target INR: 2.0-3.0  Dose prior to admission: 2.5mg M, 5mg all other days    Recent Labs     01/02/24  1214 01/03/24  1209 01/04/24  0344   INR 4.01* 3.38* 2.68*     Recent Labs     01/02/24  0115 01/03/24  0350 01/04/24  0344   HGB 10.9* 9.3* 9.1*    122* 105*     Concurrent anticoagulants/antiplatelets: ticagrelor   Significant warfarin drug-drug interactions: amiodarone (new), acetaminophen,      Date INR Warfarin Dose   01/02/24 4.01 No warfarin    01/03/24 3.38 No warfarin    01/04/24 2.68  2.5 mg                                        Monitoring:                   INR will be monitored daily until therapeutic INR is achieved.    **Please contact pharmacy for discharge instructions when indicated**    Amaya Villavicencio RPh BCPS  1/4/2024 8:47 AM

## 2024-01-04 NOTE — PLAN OF CARE
Problem: Discharge Planning  Goal: Discharge to home or other facility with appropriate resources  Outcome: Progressing  Flowsheets (Taken 1/4/2024 0156)  Discharge to home or other facility with appropriate resources:   Identify barriers to discharge with patient and caregiver   Identify discharge learning needs (meds, wound care, etc)     Problem: Pain  Goal: Verbalizes/displays adequate comfort level or baseline comfort level  Outcome: Progressing  Flowsheets (Taken 1/4/2024 0156)  Verbalizes/displays adequate comfort level or baseline comfort level:   Encourage patient to monitor pain and request assistance   Assess pain using appropriate pain scale     Problem: Safety - Adult  Goal: Free from fall injury  Outcome: Progressing  Note: Bed locked & in low position, call light in reach, side-rails up x2, bed/chair alarm utilized, non-slip socks on when ambulating, reminded patient to use call light to call for assistance.      Problem: Risk for Elopement  Goal: Patient will not exit the unit/facility without proper excort  Outcome: Progressing  Flowsheets (Taken 1/4/2024 0156)  Nursing Interventions for Elopement Risk:   Assist with personal care needs such as toileting, eating, dressing, as needed to reduce the risk of wandering   Collaborate with family members/caregivers to mitigate the elopement risk   Collaborate with treatment team for drug withdrawal symptoms treatment   Collaborate with treatment team for nicotine replacement   Communicate/escalate to charge nurse the risk of elopement   Place patient in room far away from exits and stairways   Reduce environmental triggers     Problem: Chronic Conditions and Co-morbidities  Goal: Patient's chronic conditions and co-morbidity symptoms are monitored and maintained or improved  Outcome: Progressing  Flowsheets (Taken 1/4/2024 0156)  Care Plan - Patient's Chronic Conditions and Co-Morbidity Symptoms are Monitored and Maintained or Improved:   Monitor and

## 2024-01-04 NOTE — CONSULTS
Diabetes Mother     High Blood Pressure Mother     Stroke Mother     Diabetes Father     Heart Disease Father     High Blood Pressure Father     Heart Disease Sister         CABG    Diabetes Maternal Grandmother     Diabetes Maternal Grandfather     Heart Disease Paternal Grandfather         Social History:  Social History     Socioeconomic History    Marital status:      Spouse name: None    Number of children: 3    Years of education: 12    Highest education level: High school graduate   Occupational History     Employer: [employer]   Tobacco Use    Smoking status: Every Day     Current packs/day: 0.50     Average packs/day: 0.5 packs/day for 43.5 years (21.7 ttl pk-yrs)     Types: Cigarettes     Start date: 7/16/1980    Smokeless tobacco: Former     Types: Snuff     Quit date: 3/31/2023   Vaping Use    Vaping Use: Never used   Substance and Sexual Activity    Alcohol use: Yes     Alcohol/week: 10.0 standard drinks of alcohol     Types: 10 Shots of liquor per week     Comment: a pint or more of liquor a day    Drug use: Not Currently    Sexual activity: Not Currently     Partners: Female     Social Determinants of Health     Financial Resource Strain: Low Risk  (5/22/2023)    Overall Financial Resource Strain (CARDIA)     Difficulty of Paying Living Expenses: Not hard at all   Food Insecurity: No Food Insecurity (1/2/2024)    Hunger Vital Sign     Worried About Running Out of Food in the Last Year: Never true     Ran Out of Food in the Last Year: Never true   Transportation Needs: No Transportation Needs (1/2/2024)    PRAPARE - Transportation     Lack of Transportation (Medical): No     Lack of Transportation (Non-Medical): No   Housing Stability: Low Risk  (1/2/2024)    Housing Stability Vital Sign     Unable to Pay for Housing in the Last Year: No     Number of Places Lived in the Last Year: 1     Unstable Housing in the Last Year: No        Allergies:  Allergies   Allergen Reactions    Latex         Or    dextrose bolus 10% 250 mL  250 mL IntraVENous PRN Lou Reina PA        Glucagon Emergency KIT 1 mg  1 mg SubCUTAneous PRN Lou Reina PA        dextrose 10 % infusion   IntraVENous Continuous PRN Lou Reina PA        oxyCODONE (ROXICODONE) immediate release tablet 5 mg  5 mg Oral Q4H PRN Lou Reina PA   5 mg at 01/04/24 0545    LORazepam (ATIVAN) tablet 1 mg  1 mg Oral Q4H PRN Lou Reina PA   1 mg at 01/04/24 0543    insulin lispro (HUMALOG) injection vial 0-8 Units  0-8 Units SubCUTAneous TID WC Lou Reina PA   2 Units at 01/02/24 1828    insulin lispro (HUMALOG) injection vial 0-4 Units  0-4 Units SubCUTAneous Nightly Lou Reina PA        atorvastatin (LIPITOR) tablet 40 mg  40 mg Oral Nightly Jose Muñiz DO   40 mg at 01/03/24 2019       Physical Examination:  Vitals:    01/04/24 0800   BP: (!) 143/75   Pulse: 59   Resp: 18   Temp: 98.1 °F (36.7 °C)   SpO2: 98%      Body mass index is 34.9 kg/m².     Intake/Output Summary (Last 24 hours) at 1/4/2024 1024  Last data filed at 1/4/2024 0439  Gross per 24 hour   Intake 260 ml   Output 2650 ml   Net -2390 ml     Patient Vitals for the past 96 hrs (Last 3 readings):   Weight   01/03/24 0314 123.3 kg (271 lb 13.2 oz)   01/02/24 0530 120.4 kg (265 lb 6.9 oz)      GENERAL: Alert and oriented. No distress.  EYES: No pallor or icterus.  ENT: No cyanosis.No thyromegaly or cervical LAP.  VESSELS: No jugular venous distension or carotid bruits.  HEART: Normal S1/S2. No murmur, rub or gallop.  LUNGS: Clear to auscultation.  ABDOMEN: Soft and non-tender.   EXTREMITIES: No lower extremity edema. Feet are warm.   NEUROLOGICAL: Grossly normal.     Laboratory And Diagnostic Data  I have personally reviewed and interpreted the results of the following diagnostic testing    Lab Results   Component Value Date    WBC 6.9 01/04/2024    HGB 9.1 (L) 01/04/2024    HCT 29.8 (L) 01/04/2024     (L) 01/04/2024    CHOL 115 11/15/2022

## 2024-01-04 NOTE — PROGRESS NOTES
Messaged that pringle catheter was removed by RN as he was really complaining about it and also that it was found not connected to bag.  At that time he other issues going on such as arrythmias of heart as well. Cardiology following.     WIll monitor now since pringle already removed.      Pt may have to be prompted to void.  It is best if pt can stand/sit to void.    If patient has not voided within 6 hrs after pringle was removed please scan bladder to ensure patient is not in retention.  If above 200 and pt after instruction to urinate is unable then can replace coude pringle catheter if needed if symptomatic.      Bladder scans PRN if increasing abdominal pain, inability to urinate, or only dribbling urine.    Call if needed.  As we are following peripherally.

## 2024-01-05 VITALS
WEIGHT: 252.21 LBS | RESPIRATION RATE: 20 BRPM | SYSTOLIC BLOOD PRESSURE: 108 MMHG | TEMPERATURE: 97.1 F | HEIGHT: 74 IN | HEART RATE: 65 BPM | BODY MASS INDEX: 32.37 KG/M2 | OXYGEN SATURATION: 100 % | DIASTOLIC BLOOD PRESSURE: 71 MMHG

## 2024-01-05 LAB
ANION GAP SERPL CALC-SCNC: 13 MEQ/L (ref 8–16)
BUN SERPL-MCNC: 43 MG/DL (ref 7–22)
CALCIUM SERPL-MCNC: 8 MG/DL (ref 8.5–10.5)
CHLORIDE SERPL-SCNC: 106 MEQ/L (ref 98–111)
CO2 SERPL-SCNC: 19 MEQ/L (ref 23–33)
CREAT SERPL-MCNC: 2.9 MG/DL (ref 0.4–1.2)
DEPRECATED RDW RBC AUTO: 52.8 FL (ref 35–45)
ERYTHROCYTE [DISTWIDTH] IN BLOOD BY AUTOMATED COUNT: 15.5 % (ref 11.5–14.5)
GFR SERPL CREATININE-BSD FRML MDRD: 24 ML/MIN/1.73M2
GLUCOSE BLD STRIP.AUTO-MCNC: 135 MG/DL (ref 70–108)
GLUCOSE SERPL-MCNC: 108 MG/DL (ref 70–108)
HCT VFR BLD AUTO: 28.1 % (ref 42–52)
HGB BLD-MCNC: 9 GM/DL (ref 14–18)
INR PPP: 2.16 (ref 0.85–1.13)
MCH RBC QN AUTO: 30.5 PG (ref 26–33)
MCHC RBC AUTO-ENTMCNC: 32 GM/DL (ref 32.2–35.5)
MCV RBC AUTO: 95.3 FL (ref 80–94)
PLATELET # BLD AUTO: 98 THOU/MM3 (ref 130–400)
PMV BLD AUTO: 10.6 FL (ref 9.4–12.4)
POTASSIUM SERPL-SCNC: 3.3 MEQ/L (ref 3.5–5.2)
RBC # BLD AUTO: 2.95 MILL/MM3 (ref 4.7–6.1)
SCAN OF BLOOD SMEAR: NORMAL
SODIUM SERPL-SCNC: 138 MEQ/L (ref 135–145)
WBC # BLD AUTO: 7.7 THOU/MM3 (ref 4.8–10.8)

## 2024-01-05 PROCEDURE — 99239 HOSP IP/OBS DSCHRG MGMT >30: CPT | Performed by: PHYSICIAN ASSISTANT

## 2024-01-05 PROCEDURE — 85610 PROTHROMBIN TIME: CPT

## 2024-01-05 PROCEDURE — 2500000003 HC RX 250 WO HCPCS: Performed by: PHYSICIAN ASSISTANT

## 2024-01-05 PROCEDURE — 2580000003 HC RX 258

## 2024-01-05 PROCEDURE — 85027 COMPLETE CBC AUTOMATED: CPT

## 2024-01-05 PROCEDURE — 6370000000 HC RX 637 (ALT 250 FOR IP)

## 2024-01-05 PROCEDURE — 80048 BASIC METABOLIC PNL TOTAL CA: CPT

## 2024-01-05 PROCEDURE — 6370000000 HC RX 637 (ALT 250 FOR IP): Performed by: PHYSICIAN ASSISTANT

## 2024-01-05 PROCEDURE — 82948 REAGENT STRIP/BLOOD GLUCOSE: CPT

## 2024-01-05 PROCEDURE — 36415 COLL VENOUS BLD VENIPUNCTURE: CPT

## 2024-01-05 RX ORDER — ATORVASTATIN CALCIUM 40 MG/1
40 TABLET, FILM COATED ORAL NIGHTLY
Qty: 30 TABLET | Refills: 3 | Status: SHIPPED | OUTPATIENT
Start: 2024-01-05

## 2024-01-05 RX ORDER — AMIODARONE HYDROCHLORIDE 200 MG/1
200 TABLET ORAL DAILY
Qty: 30 TABLET | Refills: 0 | Status: SHIPPED | OUTPATIENT
Start: 2024-01-06

## 2024-01-05 RX ORDER — WARFARIN SODIUM 5 MG/1
5 TABLET ORAL
Status: DISCONTINUED | OUTPATIENT
Start: 2024-01-05 | End: 2024-01-05 | Stop reason: HOSPADM

## 2024-01-05 RX ADMIN — DOXAZOSIN MESYLATE 2 MG: 2 TABLET ORAL at 08:22

## 2024-01-05 RX ADMIN — Medication 100 MG: at 08:22

## 2024-01-05 RX ADMIN — AMIODARONE HYDROCHLORIDE 200 MG: 200 TABLET ORAL at 08:22

## 2024-01-05 RX ADMIN — AMIODARONE HYDROCHLORIDE 0.5 MG/MIN: 1.8 INJECTION, SOLUTION INTRAVENOUS at 10:32

## 2024-01-05 RX ADMIN — METOPROLOL SUCCINATE 100 MG: 100 TABLET, EXTENDED RELEASE ORAL at 08:22

## 2024-01-05 RX ADMIN — OXYCODONE 5 MG: 5 TABLET ORAL at 02:05

## 2024-01-05 RX ADMIN — POTASSIUM CHLORIDE 40 MEQ: 1500 TABLET, EXTENDED RELEASE ORAL at 08:35

## 2024-01-05 RX ADMIN — Medication 1 TABLET: at 08:22

## 2024-01-05 RX ADMIN — BUMETANIDE 2 MG: 1 TABLET ORAL at 08:22

## 2024-01-05 RX ADMIN — TICAGRELOR 90 MG: 90 TABLET ORAL at 08:22

## 2024-01-05 RX ADMIN — OXYCODONE 5 MG: 5 TABLET ORAL at 10:30

## 2024-01-05 RX ADMIN — FOLIC ACID 1 MG: 1 TABLET ORAL at 08:22

## 2024-01-05 RX ADMIN — HYDRALAZINE HYDROCHLORIDE 50 MG: 50 TABLET, FILM COATED ORAL at 08:22

## 2024-01-05 RX ADMIN — MEXILETINE HYDROCHLORIDE 300 MG: 150 CAPSULE ORAL at 08:22

## 2024-01-05 RX ADMIN — SODIUM CHLORIDE, PRESERVATIVE FREE 10 ML: 5 INJECTION INTRAVENOUS at 08:23

## 2024-01-05 RX ADMIN — ISOSORBIDE MONONITRATE 120 MG: 60 TABLET, EXTENDED RELEASE ORAL at 08:22

## 2024-01-05 ASSESSMENT — PAIN DESCRIPTION - LOCATION
LOCATION: SHOULDER
LOCATION: BACK

## 2024-01-05 ASSESSMENT — PAIN SCALES - GENERAL
PAINLEVEL_OUTOF10: 8
PAINLEVEL_OUTOF10: 8
PAINLEVEL_OUTOF10: 6

## 2024-01-05 ASSESSMENT — PAIN DESCRIPTION - FREQUENCY: FREQUENCY: INTERMITTENT

## 2024-01-05 ASSESSMENT — PAIN DESCRIPTION - PAIN TYPE: TYPE: ACUTE PAIN

## 2024-01-05 ASSESSMENT — PAIN DESCRIPTION - ORIENTATION
ORIENTATION: MID;UPPER;LOWER
ORIENTATION: LEFT

## 2024-01-05 ASSESSMENT — PAIN DESCRIPTION - ONSET: ONSET: ON-GOING

## 2024-01-05 ASSESSMENT — PAIN DESCRIPTION - DESCRIPTORS
DESCRIPTORS: ACHING
DESCRIPTORS: ACHING

## 2024-01-05 ASSESSMENT — PAIN - FUNCTIONAL ASSESSMENT
PAIN_FUNCTIONAL_ASSESSMENT: ACTIVITIES ARE NOT PREVENTED
PAIN_FUNCTIONAL_ASSESSMENT: PREVENTS OR INTERFERES SOME ACTIVE ACTIVITIES AND ADLS

## 2024-01-05 NOTE — PROGRESS NOTES
Pharmacy Discharge Medication Counseling Note    Pt discharged from [facility] today.    Discharge Medications:         Medication List        START taking these medications      amiodarone 200 MG tablet  Commonly known as: CORDARONE  Take 1 tablet by mouth daily  Start taking on: January 6, 2024     atorvastatin 40 MG tablet  Commonly known as: LIPITOR  Take 1 tablet by mouth nightly            CONTINUE taking these medications      ALPRAZolam 0.5 MG tablet  Commonly known as: XANAX  TAKE 1 TABLET BY MOUTH AT BEDTIME FOR ANXIETY     bumetanide 2 MG tablet  Commonly known as: BUMEX  TAKE 1 TABLET BY MOUTH IN THE MORNING     dapagliflozin 5 MG tablet  Commonly known as: Farxiga  Take 1 tablet by mouth every morning     doxazosin 2 MG tablet  Commonly known as: CARDURA  Take 1 tablet by mouth daily     hydrALAZINE 50 MG tablet  Commonly known as: APRESOLINE  Take 1 tablet by mouth 3 times daily     isosorbide mononitrate 120 MG extended release tablet  Commonly known as: IMDUR  Take 1 tablet by mouth daily     Lantus SoloStar 100 UNIT/ML injection pen  Generic drug: insulin glargine     linagliptin 5 MG tablet  Commonly known as: Tradjenta  TAKE 1 TABLET BY MOUTH DAILY     metoprolol succinate 100 MG extended release tablet  Commonly known as: Toprol XL  Take 1 tablet by mouth daily     mexiletine 150 MG capsule  Commonly known as: MEXITIL  Take 2 capsules by mouth 3 times daily     multivitamin Tabs tablet  Take 1 tablet by mouth daily     potassium chloride 20 MEQ extended release tablet  Commonly known as: KLOR-CON M  Take 1 tablet by mouth daily     thiamine 100 MG tablet  Take 1 tablet by mouth daily     ticagrelor 90 MG Tabs tablet  Commonly known as: Brilinta  Take 1 tablet by mouth 2 times daily     warfarin 5 MG tablet  Commonly known as: Coumadin  Take 1 tablet by mouth daily            STOP taking these medications      amLODIPine 5 MG tablet  Commonly known as: NORVASC            ASK your  doctor about these medications      glucose 4 g chewable tablet  Take 4 tablets by mouth as needed for Low blood sugar          Medication Instructions:    Warfarin Discharge Instructions:   Date Warfarin Dose    01/06/24 (tomorrow) 5 mg (1 tablet)   01/07/24 5 mg (1 tablet)   01/08/24 2.5 mg (1/2 tablet)   01/09/24 INR check     Provider dosing warfarin: [facility]  Next INR date: Tuesday, 01/09/24 @ 10:45 AM               Where to Get Your Medications        These medications were sent to [facility] [address] - P [phone] - F [phone]  [address] [place] [id number]      Phone: [phone]   amiodarone 200 MG tablet  atorvastatin 40 MG tablet         Discharge instructions reviewed with the patient.   Patient taught utilizing teach-back method and verbalized understanding.   Provided medication education sheets for all new medications.

## 2024-01-05 NOTE — DISCHARGE INSTR - MEDS
Warfarin Discharge Instructions:   Date Warfarin Dose    01/06/24 (tomorrow) 5 mg (1 tablet)   01/07/24 5 mg (1 tablet)   01/08/24 2.5 mg (1/2 tablet)   01/09/24 INR check     Provider dosing warfarin: [facility]  Next INR date: Tuesday, 01/09/24 @ 10:45 AM

## 2024-01-05 NOTE — PLAN OF CARE
Problem: Discharge Planning  Goal: Discharge to home or other facility with appropriate resources  Outcome: Progressing  Flowsheets (Taken 1/5/2024 0547)  Discharge to home or other facility with appropriate resources:   Identify barriers to discharge with patient and caregiver   Identify discharge learning needs (meds, wound care, etc)     Problem: Pain  Goal: Verbalizes/displays adequate comfort level or baseline comfort level  Outcome: Progressing  Flowsheets (Taken 1/5/2024 0547)  Verbalizes/displays adequate comfort level or baseline comfort level:   Encourage patient to monitor pain and request assistance   Assess pain using appropriate pain scale   Administer analgesics based on type and severity of pain and evaluate response   Implement non-pharmacological measures as appropriate and evaluate response  Note: Ongoing assessment & interventions provided throughout shift.  Reminded patient to report any pain, pressure, or shortness of breath to the nurse.  Pain medications provided per physician's orders.      Problem: Safety - Adult  Goal: Free from fall injury  Outcome: Progressing  Note: Bed locked & in low position, call light in reach, side-rails up x2, bed/chair alarm utilized, non-slip socks on when ambulating, reminded patient to use call light to call for assistance.      Problem: Risk for Elopement  Goal: Patient will not exit the unit/facility without proper excort  Outcome: Progressing  Flowsheets (Taken 1/5/2024 0547)  Nursing Interventions for Elopement Risk:   Assist with personal care needs such as toileting, eating, dressing, as needed to reduce the risk of wandering   Collaborate with family members/caregivers to mitigate the elopement risk   Collaborate with treatment team for drug withdrawal symptoms treatment   Communicate/escalate to charge nurse the risk of elopement   Make sure patient has all necessary personal care items   Place patient in room far away from exits and stairways    Reduce environmental triggers     Problem: Chronic Conditions and Co-morbidities  Goal: Patient's chronic conditions and co-morbidity symptoms are monitored and maintained or improved  Outcome: Progressing  Flowsheets (Taken 1/5/2024 2649)  Care Plan - Patient's Chronic Conditions and Co-Morbidity Symptoms are Monitored and Maintained or Improved:   Monitor and assess patient's chronic conditions and comorbid symptoms for stability, deterioration, or improvement   Collaborate with multidisciplinary team to address chronic and comorbid conditions and prevent exacerbation or deterioration   Update acute care plan with appropriate goals if chronic or comorbid symptoms are exacerbated and prevent overall improvement and discharge     Problem: ABCDS Injury Assessment  Goal: Absence of physical injury  Outcome: Progressing  Flowsheets (Taken 1/5/2024 0515)  Absence of Physical Injury: Implement safety measures based on patient assessment   Care plan reviewed with patient.  Patient verbalizes understanding of the care plan and contributed to goal setting.

## 2024-01-05 NOTE — CARE COORDINATION
1/5/24, 12:13 PM EST    Patient goals/plan/ treatment preferences discussed by  and .  Patient goals/plan/ treatment preferences reviewed with patient/ family.  Patient/ family verbalize understanding of discharge plan and are in agreement with goal/plan/treatment preferences.  Understanding was demonstrated using the teach back method.  AVS provided by RN at time of discharge, which includes all necessary medical information pertaining to the patients current course of illness, treatment, post-discharge goals of care, and treatment preferences.     Services At/After Discharge: None  Met with Garrett. Discussed discharge today. He is agreeable. He has a ride home. Plans to return home alone. Denies needs for DME or HH services.

## 2024-01-05 NOTE — PROGRESS NOTES
Warfarin Discharge Recommendations    Patient discharged from [facility] today on warfarin.    Warfarin indication: Atrial fibrillation or Atrial flutter  INR goal during admission: 2.0-3.0  Previous home warfarin regimen: 2.5mg M, 5mg TWThFSaSu   Interacting medications at discharge: amiodarone (new), ticagrelor (increased risk of bleeding)  Coumadin 5 mg tabs    Hospital Warfarin Doses & INR Results    Date INR Warfarin Dose   01/02/24 4.01 No warfarin    01/03/24 3.38 No warfarin    01/04/24 2.68  2.5 mg     01/05/24 2.16  5 mg             Recent INRs:  Recent Labs     01/05/24  0337   INR 2.16*     Warfarin Discharge Instructions:   Date Warfarin Dose    01/06/24 (tomorrow) 5 mg (1 tablet)   01/07/24 5 mg (1 tablet)   01/08/24 2.5 mg (1/2 tablet)   01/09/24 INR check     Provider dosing warfarin: [facility]  Next INR date: Tuesday, 01/09/24 @ 10:45 AM     Darline Rosario, PharmD  PGY1 Resident

## 2024-01-05 NOTE — PROGRESS NOTES
Physician Progress Note      PATIENT:               ALESSIO SARABIA  CSN #:                  [id number]  :                       1965  ADMIT DATE:       2024 12:51 AM  DISCH DATE:  RESPONDING  PROVIDER #:        Lou REESE          QUERY TEXT:    Patient admitted with VTACH . H&P states \"HAGMA: 2/2 suspected mild DKA mixed   with alcohol ketosis due to currently only drinking alcohol and not eating any   food.\"  Glucose 246 and BHB 1.89 on admission.  DKA not documented since H&P.   If possible, please document in the progress notes and discharge summary if   DKA was:    The medical record reflects the following:  Risk Factors: DM2  Clinical Indicators:  H&P states \"HAGMA: 2/2 suspected mild DKA mixed with   alcohol ketosis due to currently only drinking alcohol and not eating any   food.\"  Glucose 246 and BHB 1.89 on admission.  Treatment: labs, insulin.    Thank you!  Airam Lopez RN  Clinical   Options provided:  -- DKA confirmed after study as evidenced by, please specify.  -- DKA treated and resolved  -- DKA ruled out after study  -- Other - I will add my own diagnosis  -- Disagree - Not applicable / Not valid  -- Disagree - Clinically unable to determine / Unknown  -- Refer to Clinical Documentation Reviewer    PROVIDER RESPONSE TEXT:    DKA ruled out after study.    Query created by: Analilia Lopez on 2024 2:37 PM      QUERY TEXT:    Patient admitted with VTACH.  Cardio note stated \"Type II NSTEMI-demand   ischemia\"   EP stated \"Troponin elevation likely from CKD\" If possible,   please document in the progress notes and discharge summary if \"Type II   NSTEMI-demand ischemia\" was:    The medical record reflects the following:  Risk Factors: CKD, VTACH, cardiomyopathy, HTN, chronic anemia  Clinical Indicators:   Cardio note stated \"Type II NSTEMI-demand ischemia\"    EP stated \"Troponin elevation likely from CKD\"  Treatment: labs, Amiodarone, Imdur,

## 2024-01-05 NOTE — PROGRESS NOTES
Discussed discharge summary with patient. Instructed patient about medications & follow up appointments. Called pharmacy and asked about meds to beds. Pharmacy tech stated that they sent meds to [facility] [address]. Informed patient. Patient denies any additional questions. Patient was discharged with all belongings. No distress noted. Patient discharged to home. Taken down to the vehicle by nurses aid per wheelchair.

## 2024-01-05 NOTE — PROGRESS NOTES
Warfarin Pharmacy Consult Note    Warfarin Indication: Atrial fibrillation or Atrial flutter  Target INR: 2.0-3.0  Dose prior to admission: 2.5mg M, 5mg TWThFSaSu    Recent Labs     01/03/24  1209 01/04/24  0344 01/05/24  0337   INR 3.38* 2.68* 2.16*     Recent Labs     01/03/24  0350 01/04/24  0344 01/05/24  0337   HGB 9.3* 9.1* 9.0*   * 105* 98*     Concurrent anticoagulants/antiplatelets: ticagrelor   Significant warfarin drug-drug interactions: amiodarone (new), acetaminophen,      Date INR Warfarin Dose   01/02/24 4.01 No warfarin    01/03/24 3.38 No warfarin    01/04/24 2.68  2.5 mg     01/05/24 2.16  5 mg                              Monitoring:                   INR will be monitored daily until therapeutic INR is achieved.    **Please contact pharmacy for discharge instructions when indicated**    Darline Rosario, PharmD  PGY1 Resident

## 2024-01-05 NOTE — PROGRESS NOTES
CLINICAL PHARMACY: DISCHARGE MED RECONCILIATION/REVIEW    [facility] Select Patient?: Yes  Total # of Interventions Recommended: 0  Total # Interventions Accepted: 0  Intervention Severity:   - Level 1 Intervention Present?: No   - Level 2 #: 0   - Level 3 #: 0   Time Spent (min): 15    Additional Documentation:

## 2024-01-07 NOTE — DISCHARGE SUMMARY
vascular congestion and interstitial edema. Moderate to severe cardiomegaly appears stable. Median sternotomy changes. Left chest wall 2 lead cardiac pacing device again noted.     1. No acute findings. Chronic moderate-to-severe cardiomegaly and mild interstitial edema. No evidence of rib fracture. This document has been electronically signed by: Asaf Clarke MD on 01/02/2024 02:56 AM       Follow-up scheduled after discharge :-    in the next few days with Leonel Adorno APRN - CNP    Consultations during this hospital stay:-  [] NONE [] Cardiology  [] Nephrology  [] Hemo onco   [] GI   [] ID  [] Endocrine  [] Pulm    [] Neuro    [] Psych   [] Urology  [] ENT   [] G SURGERY   []Ortho    []CV surg    [] Palliative  [] Hospice [] Pain management   []    []TCU   [] PT/OT  OTHERS:-    Disposition: home  Condition at Discharge: Stable    Time Spent:- 40 minutes    Electronically signed by HUGH Dewitt on 1/7/24 at 5:42 PM EST   Discharging Hospitalist

## 2024-01-08 ENCOUNTER — TELEPHONE (OUTPATIENT)
Dept: UROLOGY | Age: 59
End: 2024-01-08

## 2024-01-08 ENCOUNTER — OFFICE VISIT (OUTPATIENT)
Dept: FAMILY MEDICINE CLINIC | Age: 59
End: 2024-01-08

## 2024-01-08 ENCOUNTER — CARE COORDINATION (OUTPATIENT)
Dept: CASE MANAGEMENT | Age: 59
End: 2024-01-08

## 2024-01-08 VITALS
WEIGHT: 255.6 LBS | DIASTOLIC BLOOD PRESSURE: 72 MMHG | RESPIRATION RATE: 16 BRPM | SYSTOLIC BLOOD PRESSURE: 138 MMHG | BODY MASS INDEX: 32.82 KG/M2 | HEART RATE: 68 BPM

## 2024-01-08 DIAGNOSIS — F32.A ACUTE DEPRESSION: ICD-10-CM

## 2024-01-08 DIAGNOSIS — D33.4 SCHWANNOMA OF SPINAL CORD (HCC): ICD-10-CM

## 2024-01-08 DIAGNOSIS — I47.20 V TACH (HCC): ICD-10-CM

## 2024-01-08 DIAGNOSIS — E11.22 CONTROLLED TYPE 2 DIABETES MELLITUS WITH CHRONIC KIDNEY DISEASE, WITHOUT LONG-TERM CURRENT USE OF INSULIN, UNSPECIFIED CKD STAGE (HCC): ICD-10-CM

## 2024-01-08 DIAGNOSIS — I25.709 CORONARY ARTERY DISEASE INVOLVING CORONARY BYPASS GRAFT OF NATIVE HEART WITH ANGINA PECTORIS (HCC): ICD-10-CM

## 2024-01-08 DIAGNOSIS — I10 ESSENTIAL HYPERTENSION: ICD-10-CM

## 2024-01-08 DIAGNOSIS — I50.43 ACUTE ON CHRONIC COMBINED SYSTOLIC AND DIASTOLIC CONGESTIVE HEART FAILURE (HCC): ICD-10-CM

## 2024-01-08 DIAGNOSIS — I50.22 CHRONIC SYSTOLIC CONGESTIVE HEART FAILURE (HCC): ICD-10-CM

## 2024-01-08 DIAGNOSIS — E78.2 MIXED HYPERLIPIDEMIA: ICD-10-CM

## 2024-01-08 DIAGNOSIS — F10.231 ALCOHOL DEPENDENCE WITH WITHDRAWAL DELIRIUM (HCC): ICD-10-CM

## 2024-01-08 DIAGNOSIS — Z09 HOSPITAL DISCHARGE FOLLOW-UP: Primary | ICD-10-CM

## 2024-01-08 DIAGNOSIS — I47.20 V TACH (HCC): Primary | ICD-10-CM

## 2024-01-08 DIAGNOSIS — D68.9 COAGULOPATHY (HCC): ICD-10-CM

## 2024-01-08 DIAGNOSIS — N18.4 CKD (CHRONIC KIDNEY DISEASE) STAGE 4, GFR 15-29 ML/MIN (HCC): ICD-10-CM

## 2024-01-08 DIAGNOSIS — F10.10 ALCOHOL ABUSE: ICD-10-CM

## 2024-01-08 DIAGNOSIS — Z95.810 ICD (IMPLANTABLE CARDIOVERTER-DEFIBRILLATOR) IN PLACE: ICD-10-CM

## 2024-01-08 DIAGNOSIS — I42.0 DILATED CARDIOMYOPATHY (HCC): ICD-10-CM

## 2024-01-08 RX ORDER — BUPROPION HYDROCHLORIDE 150 MG/1
150 TABLET ORAL EVERY MORNING
Qty: 30 TABLET | Refills: 1 | Status: SHIPPED | OUTPATIENT
Start: 2024-01-08

## 2024-01-08 ASSESSMENT — ENCOUNTER SYMPTOMS
COUGH: 0
SHORTNESS OF BREATH: 0
NAUSEA: 0
ABDOMINAL PAIN: 0

## 2024-01-08 NOTE — TELEPHONE ENCOUNTER
Consult for retention  Had yary, he requested it to be removed  Needs OV in the upcoming wks with DIEUDONNE to establish care, PVR

## 2024-01-08 NOTE — CARE COORDINATION
scheduled within 7 days of discharge? Yes.    Follow Up  Future Appointments   Date Time Provider Department Center   1/8/2024  1:45 PM Loenel Adorno, ISABELLA Matias CNP [facility] [place]   2/7/2024  3:30 PM SCHEDULE, [facility] PACER NURSE [facility] [place]   2/7/2024  3:30 PM Shelly Johnson MD [facility] [place]   2/26/2024  1:00 PM Fany Guzmán MD [facility] [place]   6/6/2024  3:30 PM Leonel Adorno, ISABELLA Matias CNP [facility] [place]       Care Transition Nurse provided contact information.  Plan for follow-up call in 3-5 days based on severity of symptoms and risk factors.  Plan for next call: symptom management-new or worsening symptoms  follow-up appointment-review f/u 1/8    Lila Raines RN

## 2024-01-08 NOTE — PROGRESS NOTES
Post-Discharge Transitional Care Follow Up      Garrett Duncan   YOB: 1965    Date of Office Visit:  1/8/2024  Date of Hospital Admission: 1/2/24  Date of Hospital Discharge: 1/5/24  Readmission Risk Score (high >=14%. Medium >=10%):Readmission Risk Score: 23.6      Care management risk score Rising risk (score 2-5) and Complex Care (Scores >=6): No Risk Score On File     Non face to face  following discharge, date last encounter closed (first attempt may have been earlier): 01/08/2024     Call initiated 2 business days of discharge: Yes     Hospital discharge follow-up  -     TN DISCHARGE MEDS RECONCILED W/ CURRENT OUTPATIENT MED LIST  V tach (HCC)  Acute on chronic combined systolic and diastolic congestive heart failure (HCC)  Dilated cardiomyopathy (HCC)  ICD (implantable cardioverter-defibrillator) in place  Chronic systolic congestive heart failure (HCC)  Alcohol dependence with withdrawal delirium (HCC)  Alcohol abuse  Controlled type 2 diabetes mellitus with chronic kidney disease, without long-term current use of insulin, unspecified CKD stage (HCC)  Coagulopathy ([facility])  Schwannoma of spinal cord (HCC)  Coronary artery disease involving coronary bypass graft of native heart with angina pectoris (HCC)  CKD (chronic kidney disease) stage 4, GFR 15-29 ml/min (HCC)  Essential hypertension  Mixed hyperlipidemia  Acute depression  -     buPROPion (WELLBUTRIN XL) 150 MG extended release tablet; Take 1 tablet by mouth every morning, Disp-30 tablet, R-1Normal        MDM: Compliance stress with medication.  Follow up with CARDIO in 1 month    ADmits to Depression.  Agrees to Wellbutrin 150 mg XL.  Cross coverage with alcohol abuse.  Failure on Lexapro and Zoloft in past with SE.     RTW 1/11/24    RTO in 6 weeks    Following with [facility]    Medical Decision Making: high complexity  Return in about 6 weeks (around 2/19/2024).           Subjective:   HPI    Inpatient course: Discharge summary

## 2024-01-11 ENCOUNTER — CARE COORDINATION (OUTPATIENT)
Dept: CASE MANAGEMENT | Age: 59
End: 2024-01-11

## 2024-01-11 NOTE — CARE COORDINATION
Care Transitions Follow Up Call      Spoke with Garrett Duncan who reported that he was doing good. Patient stated that he was at work and would prefer a call after 4 pm.      Sharonda Villanueva LPN

## 2024-01-16 ENCOUNTER — CARE COORDINATION (OUTPATIENT)
Dept: CASE MANAGEMENT | Age: 59
End: 2024-01-16

## 2024-01-16 NOTE — CARE COORDINATION
Care Transitions Follow Up Call    Patient Current Location:  [place] [address]    Care Transition Nurse contacted the patient by telephone to follow up after admission on 24.  Verified name and  with patient as identifiers.    Patient: Garrett Duncan  Patient : 1965   MRN: [id number]  Reason for Admission: Ventricular tachycardia   Discharge Date: 24 RARS: Readmission Risk Score: 23.6      Needs to be reviewed by the provider   Additional needs identified to be addressed with provider: No  none             Method of communication with provider: none.    Spoke with Garrett, said he is doing ok.  Has returned to work.  Denies chest pain/palpitations, fever, chills, dyspnea, edema.  Saw PCP last week, started on Wellbutrin.  Was able to get his INR checked.  BS have been fairly stable.  Eating, drinking, sleeping ok.  B&B normal.  No other issues to report.  Denies any other needs.  No other questions or concerns at this time.  Will continue to follow.    Addressed changes since last contact:  medications-Wellbutrin  Discussed follow-up appointments. If no appointment was previously scheduled, appointment scheduling offered: Yes.   Is follow up appointment scheduled within 7 days of discharge? Yes.    Follow Up  Future Appointments   Date Time Provider Department Center   2024  3:30 PM SCHEDULE, [facility] PACER NURSE [facility] - [place]   2024  3:30 PM Shelly Johnson MD N [facility] [place]   2024  3:30 PM Leonel Adorno, APRN - CNP [facility] [place]   2024  1:00 PM Fayn Guzmán MD N [facility] [place]   2024  3:30 PM Leonel Adorno, APRN - CNP [facility] [place]     External follow up appointment(s): nicole    Care Transition Nurse reviewed medical action plan and red flags with patient and discussed any barriers to care and/or understanding of plan of care after discharge. Discussed appropriate site of care based on symptoms and

## 2024-01-24 ENCOUNTER — CARE COORDINATION (OUTPATIENT)
Dept: CASE MANAGEMENT | Age: 59
End: 2024-01-24

## 2024-01-26 ENCOUNTER — CARE COORDINATION (OUTPATIENT)
Dept: CASE MANAGEMENT | Age: 59
End: 2024-01-26

## 2024-01-26 NOTE — CARE COORDINATION
Care Transitions Outreach Attempt-2nd attempt    Call within 2 business days of discharge: Yes   Attempted to reach patient for subsequent transitional call.  Left HIPPA compliant VM to return call directly to [phone].  If no return call, CTN will sign off-2nd attempt.  Unable to reach letter sent via [employer].    Patient: Garrett Duncan Patient : 1965 MRN: [id number]    Last Discharge Facility       Date Complaint Diagnosis Description Type Department Provider    24 Depression Other ventricular tachycardia (HCC) ... ED to Hosp-Admission (Discharged) (ADMITTED) Lou Rust PA; Titi Carreon...              Noted following upcoming appointments from discharge chart review:   BS follow up appointment(s):   Future Appointments   Date Time Provider Department Center   2024  3:30 PM SCHEDULE, [facility] PACER NURSE N [facility] - [place]   2024  3:30 PM Shelly Johnson MD N [facility] - [place]   2024  3:30 PM Leonel Adorno, APRN - CNP [facility] [place]   2024  1:00 PM Fany Guzmán MD N [facility] - [place]   2024  3:30 PM Leonel Adorno, APRN - CNP [facility] [place]     Non-BS  follow up appointment(s): nicole

## 2024-01-30 ENCOUNTER — HOSPITAL ENCOUNTER (EMERGENCY)
Age: 59
Discharge: HOME | End: 2024-01-30
Payer: COMMERCIAL

## 2024-01-30 ENCOUNTER — APPOINTMENT (OUTPATIENT)
Dept: GENERAL RADIOLOGY | Age: 59
End: 2024-01-30
Payer: COMMERCIAL

## 2024-01-30 ENCOUNTER — PROCEDURE VISIT (OUTPATIENT)
Dept: CARDIOLOGY CLINIC | Age: 59
End: 2024-01-30
Payer: COMMERCIAL

## 2024-01-30 VITALS
RESPIRATION RATE: 18 BRPM | SYSTOLIC BLOOD PRESSURE: 153 MMHG | HEART RATE: 66 BPM | BODY MASS INDEX: 33.38 KG/M2 | TEMPERATURE: 98.2 F | WEIGHT: 260 LBS | OXYGEN SATURATION: 99 % | DIASTOLIC BLOOD PRESSURE: 81 MMHG

## 2024-01-30 DIAGNOSIS — I50.20 SYSTOLIC CONGESTIVE HEART FAILURE, NYHA CLASS 2, UNSPECIFIED CONGESTIVE HEART FAILURE CHRONICITY (HCC): Primary | ICD-10-CM

## 2024-01-30 DIAGNOSIS — S62.617A CLOSED DISPLACED FRACTURE OF PROXIMAL PHALANX OF LEFT LITTLE FINGER, INITIAL ENCOUNTER: Primary | ICD-10-CM

## 2024-01-30 PROCEDURE — 73130 X-RAY EXAM OF HAND: CPT

## 2024-01-30 PROCEDURE — 93297 REM INTERROG DEV EVAL ICPMS: CPT | Performed by: NUCLEAR MEDICINE

## 2024-01-30 PROCEDURE — 99283 EMERGENCY DEPT VISIT LOW MDM: CPT

## 2024-01-30 RX ORDER — HYDROCODONE BITARTRATE AND ACETAMINOPHEN 5; 325 MG/1; MG/1
1 TABLET ORAL EVERY 8 HOURS PRN
Qty: 6 TABLET | Refills: 0 | Status: SHIPPED | OUTPATIENT
Start: 2024-01-30 | End: 2024-02-01

## 2024-01-30 ASSESSMENT — PAIN SCALES - GENERAL: PAINLEVEL_OUTOF10: 7

## 2024-01-30 ASSESSMENT — PAIN - FUNCTIONAL ASSESSMENT: PAIN_FUNCTIONAL_ASSESSMENT: 0-10

## 2024-01-30 ASSESSMENT — PAIN DESCRIPTION - ORIENTATION: ORIENTATION: LEFT

## 2024-01-30 ASSESSMENT — PAIN DESCRIPTION - LOCATION: LOCATION: FINGER (COMMENT WHICH ONE)

## 2024-01-30 NOTE — PROGRESS NOTES
DR FUNEZ PT   MEDTRONIC CARELINK OPTIVOL REMOTE   BATTERY 12 MTHS REMAINING  OPTIVOL WNL      EPISODES OF SVT AND NS VT

## 2024-01-30 NOTE — ED TRIAGE NOTES
Pt comes to ED with left pinky finger injury. PT states that he got his pinky caught in a door jam this morning and it went sideways. Pt states he moved it back in place. Bruising noted to the pinky. PT states it just hurts really bad and thinks he needs an xray.

## 2024-01-31 ENCOUNTER — TELEPHONE (OUTPATIENT)
Dept: FAMILY MEDICINE CLINIC | Age: 59
End: 2024-01-31

## 2024-02-03 NOTE — ED PROVIDER NOTES
information can be found in the ED course)          My Independent interpretations:     EKG:           Imaging: fx of the fifth phalanx of the left hand    Labs:                        Decision Rules/Clinical Scores utilized:                        Previous visit summary and patient history available on EMR and was reviewed.     History obtained from chart review and the patient.     Pertinent previous records reviewed:  previous notes, admissions and hospitalizations .    Code Status: Not addressed at time of initial evaluation and reviewed.             See Formal Diagnostic Results above for the lab and radiology tests and orders.         3)  Treatment and Disposition         ED Reassessment:   stable         Case discussed with consulting clinician/attending physician:            Shared Decision-Making was performed and disposition discussed with the       Patient/Family and questions answered          Social determinants of health impacting treatment or disposition:                     Medical Decision Making  Patient was seen evaluated emergency room for left fifth digit injury.  He dislocated earlier and put it back.  States he continues to have pain and swelling and some bruising.  Sensation intact.  He is able to move it but has some pain when he moves it.  Fifth digit is a little weaker than the rest of his hand.  X-ray shows an acute fracture.  Reviewed the findings with patient.  It is in good alignment.  Patient is placed in a AlumaFoam splint with the fourth and fifth digit.  He is advised to follow-up with orthopedics on the next business day.  Patient is agreeable.  He is discharged home with appropriate medications.  Strict return and follow-up precautions are provided      Vitals Reviewed:    Vitals:    01/30/24 1617 01/30/24 1619   BP:  (!) 153/81   Pulse:  66   Resp:  18   Temp:  98.2 °F (36.8 °C)   TempSrc:  Oral   SpO2:  99%   Weight: 117.9 kg (260 lb)        The patient was seen and examined.  Appropriate diagnostic testing was performed and results reviewed with the patient.         The results of pertinent diagnostic studies and exam findings were discussed. The patient’s provisional diagnosis and plan of care were discussed with the patient and present family who expressed understanding and agreement with the POC. Any medications were reviewed and indications and risks of medications were discussed with the patient /family present. Strict verbal and written return precautions, instructions and appropriate follow-up provided to  the patient.   Patient was DISCHARGED from the hospital. Based on the reassuring ED workup and patient's stable vital signs, I feel the patient may be safely discharged home. At this point in time, I believe the patient has the mental capacity to make medical decisions.      No notes of EC Admission Criteria type on file.        Patient was seen independently by myself. The patient's final impression and disposition and plan was determined by myself.     Strict return precautions and follow up instructions were discussed with the patient prior to discharge, with which the patient agrees.    Physical assessment findings, diagnostic testing(s) if applicable, and vital signs reviewed with patient/patient representative.  Questions answered.   Medications asdirected, including OTC medications for supportive care.   Education provided on medications.  Differential diagnosis(s) discussed with patient/patient representative.  Home care/self care instructions reviewed withpatient/patient representative.  Patient is to follow-up with family care provider in 2-3 days if no improvement.  Patient is to go to the emergency department if symptoms worsen.  Patient/patient representative isaware of care plan, questions answered, verbalizes understanding and is in agreement.     ED Medications administered this visit:  (None if blank)  Medications - No data to

## 2024-02-05 RX ORDER — AMIODARONE HYDROCHLORIDE 200 MG/1
200 TABLET ORAL DAILY
Qty: 90 TABLET | Refills: 3 | Status: SHIPPED | OUTPATIENT
Start: 2024-02-05

## 2024-02-05 NOTE — TELEPHONE ENCOUNTER
Patient requesting refill of Amiodarone to [facility].  Will check with pharmacy after 2pm.  Please refill if appropriate

## 2024-02-06 NOTE — PATIENT INSTRUCTIONS
You may receive a survey regarding the care you received during your visit.  Your input is valuable to us.  We encourage you to complete and return your survey.  We hope you will choose us in the future for your healthcare needs.    Thank you for choosing [facility]!    Your Medical Assistant Today:  Anika DIXON      Your RN Today:  Peyton DIXON  Your Provider for Today: Dr. Johnson  Ph. [phone]

## 2024-02-07 ENCOUNTER — OFFICE VISIT (OUTPATIENT)
Dept: CARDIOLOGY CLINIC | Age: 59
End: 2024-02-07
Payer: COMMERCIAL

## 2024-02-07 ENCOUNTER — NURSE ONLY (OUTPATIENT)
Dept: CARDIOLOGY CLINIC | Age: 59
End: 2024-02-07
Payer: COMMERCIAL

## 2024-02-07 VITALS
SYSTOLIC BLOOD PRESSURE: 148 MMHG | HEART RATE: 62 BPM | WEIGHT: 265 LBS | OXYGEN SATURATION: 99 % | DIASTOLIC BLOOD PRESSURE: 76 MMHG | BODY MASS INDEX: 34.01 KG/M2 | HEIGHT: 74 IN

## 2024-02-07 DIAGNOSIS — I48.0 PAROXYSMAL ATRIAL FIBRILLATION (HCC): Primary | ICD-10-CM

## 2024-02-07 DIAGNOSIS — Z95.810 PRESENCE OF COMBINATION INTERNAL CARDIAC DEFIBRILLATOR (ICD) AND PACEMAKER: Primary | ICD-10-CM

## 2024-02-07 DIAGNOSIS — Z79.899 ON AMIODARONE THERAPY: ICD-10-CM

## 2024-02-07 PROCEDURE — 93000 ELECTROCARDIOGRAM COMPLETE: CPT | Performed by: INTERNAL MEDICINE

## 2024-02-07 PROCEDURE — 99214 OFFICE O/P EST MOD 30 MIN: CPT | Performed by: INTERNAL MEDICINE

## 2024-02-07 PROCEDURE — 93283 PRGRMG EVAL IMPLANTABLE DFB: CPT | Performed by: INTERNAL MEDICINE

## 2024-02-07 PROCEDURE — 3078F DIAST BP <80 MM HG: CPT | Performed by: INTERNAL MEDICINE

## 2024-02-07 PROCEDURE — 3077F SYST BP >= 140 MM HG: CPT | Performed by: INTERNAL MEDICINE

## 2024-02-07 NOTE — PROGRESS NOTES
[facility]   [facility] (EP)  [address] [address]  Dept: [phone]  Cardiac Electrophysiology: Follow up Note  Patient's demographics:  Date:   2/7/2024  Patient name:              Garrett Duncan  YOB: 1965  Sex:    male   MRN:   [id number]    Primary Care Physician:  Leonel Adorno, APRN - CNP    Cardiologist:  Maura Guzmán MD    Reason for Follow up:  Discuss atrial fibrillation ablation    Clinical Summary:  57/M with symptomatic long standing persistent AF, failed amiodarone and underwent atrial fibrillation ablation (PVI) and CTI ablation (induced flutter) on 11/21/2022.    No complaints.  Feels tired all the time.  No chest pain, shortness of breath, ICD shocks.  No groin bleeding or pain. Medical Hx: PeAF dx 2014=> recurrence sp DCCV 4/2022 => RF ablation and CTI ablation (11/21/2022). CAD/CABG x3 (5/2014) and PCI-LAD (10/2020), ICM (LVEF 25-30%), VT ablation (2014) on amiodarone and mexilitine, NSVT, Medtronic secondary prevention DC-AICD by Dr. Frederick (10/2014),  anomalous origin of RCA, HTN, HPL, obesity and CKD-III    Review of systems:  Constitutional: Negative for chills and fever  HENT: Negative for congestion, sinus pressure, sneezing and sore throat.    Eyes: Negative for pain, discharge, redness and itching.   Respiratory: Negative for apnea, cough  Gastrointestinal: Negative for blood in stool, constipation, diarrhea   Endocrine: Negative for cold intolerance, heat intolerance, polydipsia.  Genitourinary: Negative for dysuria, enuresis, flank pain and hematuria.   Musculoskeletal: Negative for arthralgias, joint swelling and neck pain.   Neurological: Negative for numbness and headaches.   Psychiatric/Behavioral: Negative for agitation, confusion, decreased concentration and dysphoric mood.      Past Medical History::  Past Medical History:   Diagnosis Date    Acute systolic CHF (congestive heart failure) (HCC) 07/16/2015

## 2024-02-07 NOTE — PROGRESS NOTES
Medtronic dual ICD     270 VT episodes     ..Battery longevity:  11 months   Presenting rhythm  AS VS with PAC's  Optivol WNl    Atrial impedance 456  RV impedance 342  Shock 57    P wave sensing 1.3  R wave sensing 10.5    5.4 % atrial paced  0.1 % RV paced     Atrial threshold 0.5 V  at 0.4ms  RV threshold 0.5 V at 0.4ms  Mode switches   coumadin /amodarone

## 2024-02-12 DIAGNOSIS — I10 ESSENTIAL HYPERTENSION: ICD-10-CM

## 2024-02-12 RX ORDER — HYDRALAZINE HYDROCHLORIDE 50 MG/1
50 TABLET, FILM COATED ORAL 3 TIMES DAILY
Qty: 90 TABLET | Refills: 0 | Status: SHIPPED | OUTPATIENT
Start: 2024-02-12 | End: 2024-02-12 | Stop reason: SDUPTHER

## 2024-02-12 RX ORDER — HYDRALAZINE HYDROCHLORIDE 50 MG/1
50 TABLET, FILM COATED ORAL 3 TIMES DAILY
Qty: 270 TABLET | Refills: 0 | Status: SHIPPED | OUTPATIENT
Start: 2024-02-12 | End: 2024-04-12

## 2024-02-12 NOTE — TELEPHONE ENCOUNTER
Garrett Duncan called requesting a refill on the following medications:  Requested Prescriptions     Pending Prescriptions Disp Refills    hydrALAZINE (APRESOLINE) 50 MG tablet 90 tablet 1     Sig: Take 1 tablet by mouth 3 times daily     Pharmacy verified:[address] Ph. [phone]  .pv      Date of last visit: [id number]  Date of next visit (if applicable): 2/26/2024

## 2024-02-20 ENCOUNTER — OFFICE VISIT (OUTPATIENT)
Dept: FAMILY MEDICINE CLINIC | Age: 59
End: 2024-02-20

## 2024-02-20 VITALS
BODY MASS INDEX: 35.22 KG/M2 | RESPIRATION RATE: 16 BRPM | WEIGHT: 274.4 LBS | HEIGHT: 74 IN | SYSTOLIC BLOOD PRESSURE: 128 MMHG | HEART RATE: 80 BPM | DIASTOLIC BLOOD PRESSURE: 70 MMHG

## 2024-02-20 DIAGNOSIS — F10.231 ALCOHOL DEPENDENCE WITH WITHDRAWAL DELIRIUM (HCC): ICD-10-CM

## 2024-02-20 DIAGNOSIS — D68.9 COAGULOPATHY (HCC): ICD-10-CM

## 2024-02-20 DIAGNOSIS — Z00.00 WELL ADULT EXAM: Primary | ICD-10-CM

## 2024-02-20 DIAGNOSIS — F41.3 OTHER MIXED ANXIETY DISORDERS: ICD-10-CM

## 2024-02-20 DIAGNOSIS — I25.709 CORONARY ARTERY DISEASE INVOLVING CORONARY BYPASS GRAFT OF NATIVE HEART WITH ANGINA PECTORIS (HCC): ICD-10-CM

## 2024-02-20 DIAGNOSIS — Z95.810 ICD (IMPLANTABLE CARDIOVERTER-DEFIBRILLATOR) IN PLACE: ICD-10-CM

## 2024-02-20 DIAGNOSIS — F10.10 ALCOHOL ABUSE: ICD-10-CM

## 2024-02-20 DIAGNOSIS — I42.0 DILATED CARDIOMYOPATHY (HCC): ICD-10-CM

## 2024-02-20 DIAGNOSIS — I50.22 CHRONIC SYSTOLIC CONGESTIVE HEART FAILURE (HCC): ICD-10-CM

## 2024-02-20 DIAGNOSIS — N18.4 CKD (CHRONIC KIDNEY DISEASE) STAGE 4, GFR 15-29 ML/MIN (HCC): ICD-10-CM

## 2024-02-20 DIAGNOSIS — E11.22 CONTROLLED TYPE 2 DIABETES MELLITUS WITH CHRONIC KIDNEY DISEASE, WITHOUT LONG-TERM CURRENT USE OF INSULIN, UNSPECIFIED CKD STAGE (HCC): ICD-10-CM

## 2024-02-20 DIAGNOSIS — F32.A ACUTE DEPRESSION: ICD-10-CM

## 2024-02-20 DIAGNOSIS — E78.2 MIXED HYPERLIPIDEMIA: ICD-10-CM

## 2024-02-20 DIAGNOSIS — I10 ESSENTIAL HYPERTENSION: ICD-10-CM

## 2024-02-20 DIAGNOSIS — E66.01 SEVERE OBESITY (BMI 35.0-39.9) WITH COMORBIDITY (HCC): ICD-10-CM

## 2024-02-20 RX ORDER — BUPROPION HYDROCHLORIDE 150 MG/1
150 TABLET ORAL EVERY MORNING
Qty: 90 TABLET | Refills: 3 | Status: SHIPPED | OUTPATIENT
Start: 2024-02-20

## 2024-02-20 ASSESSMENT — ENCOUNTER SYMPTOMS
SHORTNESS OF BREATH: 0
BACK PAIN: 1
ABDOMINAL PAIN: 0
COUGH: 0
NAUSEA: 0

## 2024-02-20 NOTE — PROGRESS NOTES
by mouth daily 30 tablet 1    glucose 4 g chewable tablet Take 4 tablets by mouth as needed for Low blood sugar 60 tablet 3    insulin glargine (LANTUS SOLOSTAR) 100 UNIT/ML injection pen Inject 10 Units into the skin nightly      metoprolol succinate (TOPROL XL) 100 MG extended release tablet Take 1 tablet by mouth daily 90 tablet 2    bumetanide (BUMEX) 2 MG tablet TAKE 1 TABLET BY MOUTH IN THE MORNING 90 tablet 2    dapagliflozin (FARXIGA) 5 MG tablet Take 1 tablet by mouth every morning 90 tablet 3    isosorbide mononitrate (IMDUR) 120 MG extended release tablet Take 1 tablet by mouth daily 90 tablet 3    Multiple Vitamin (MULTIVITAMIN) TABS tablet Take 1 tablet by mouth daily 30 tablet 0     No current facility-administered medications for this visit.          Discussed with the patient the current USPSTF guidelines released March 9, 2021 for screening for lung cancer.    For adults aged 50 to 80 years who have a 20 pack-year smoking history and currently smoke or have quit within the past 15 years the grade B recommendation is to:  Screen for lung cancer with low-dose computed tomography (LDCT) every year.  Stop screening once a person has not smoked for 15 years or has a health problem that limits life expectancy or the ability to have lung surgery.    The patient  reports that he has been smoking cigarettes. He started smoking about 43 years ago. He has a 21.8 pack-year smoking history. He has never been exposed to tobacco smoke. He quit smokeless tobacco use about 10 months ago.  His smokeless tobacco use included snuff.. Discussed with patient the risks and benefits of screening, including over-diagnosis, false positive rate, and total radiation exposure.  The patient currently exhibits no signs or symptoms suggestive of lung cancer.  Discussed with patient the importance of compliance with yearly annual lung cancer screenings and willingness to undergo diagnosis and treatment if screening scan is

## 2024-02-26 ENCOUNTER — OFFICE VISIT (OUTPATIENT)
Dept: CARDIOLOGY CLINIC | Age: 59
End: 2024-02-26
Payer: COMMERCIAL

## 2024-02-26 VITALS
SYSTOLIC BLOOD PRESSURE: 142 MMHG | DIASTOLIC BLOOD PRESSURE: 68 MMHG | HEIGHT: 74 IN | WEIGHT: 275 LBS | HEART RATE: 66 BPM | BODY MASS INDEX: 35.29 KG/M2

## 2024-02-26 DIAGNOSIS — I25.5 ISCHEMIC CARDIOMYOPATHY: Primary | ICD-10-CM

## 2024-02-26 DIAGNOSIS — I25.810 CORONARY ARTERY DISEASE INVOLVING CORONARY BYPASS GRAFT OF NATIVE HEART WITHOUT ANGINA PECTORIS: ICD-10-CM

## 2024-02-26 DIAGNOSIS — I10 ESSENTIAL HYPERTENSION: ICD-10-CM

## 2024-02-26 DIAGNOSIS — I48.0 PAROXYSMAL ATRIAL FIBRILLATION (HCC): ICD-10-CM

## 2024-02-26 PROCEDURE — 99214 OFFICE O/P EST MOD 30 MIN: CPT | Performed by: NUCLEAR MEDICINE

## 2024-02-26 PROCEDURE — 3078F DIAST BP <80 MM HG: CPT | Performed by: NUCLEAR MEDICINE

## 2024-02-26 PROCEDURE — 3077F SYST BP >= 140 MM HG: CPT | Performed by: NUCLEAR MEDICINE

## 2024-02-26 NOTE — PROGRESS NOTES
Patient here for check up.  Patient declined refills today.  Patient states he will call.  Patient complains of working too much. Feel's run down and tired.   Patient states he may have some SOB.

## 2024-02-26 NOTE — PROGRESS NOTES
[facility] [place] [facility]  [facility] CARDIOLOGY  [address]  [address]  [place] [address]  Dept: [phone]  Dept Fax: [phone]  Loc: [phone]    Visit Date: 2/26/2024    Garrett Duncan is a 58 y.o. 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     Intradural extramedullary spinal tumor     Long term (current) use of anticoagulants 08/29/2019    Medtronic dual icd      Neuromuscular disorder ([facility])     Paroxysmal atrial fibrillation ([facility]) 07/22/2014    Severe obesity (BMI 35.0-39.9) with comorbidity (HCC) 05/22/2023    V-tach ([facility])     s/p ablation in dec 2014      Past Surgical History:   Procedure Laterality Date    [facility]    CARDIAC DEFIBRILLATOR PLACEMENT  10/13/2015    [employer]TR[employer]IC [place], MRI CONDITIONAL ICD    CARDIAC ELECTROPHYSIOLOGY STUDY AND ABLATION      CORONARY ARTERY BYPASS GRAFT  05/09/2014    3 bypass    EKG 12-LEAD  07/17/2015         OTHER SURGICAL HISTORY Left 10/21/2014    Left Bursectomy, I & D Left Elbow - Dr. Garduno    PACEMAKER PLACEMENT      SD LAMINECTOMY W/O

## 2024-02-29 ENCOUNTER — APPOINTMENT (OUTPATIENT)
Dept: ULTRASOUND IMAGING | Age: 59
DRG: 880 | End: 2024-02-29
Payer: COMMERCIAL

## 2024-02-29 ENCOUNTER — APPOINTMENT (OUTPATIENT)
Dept: CT IMAGING | Age: 59
DRG: 880 | End: 2024-02-29
Payer: COMMERCIAL

## 2024-02-29 ENCOUNTER — APPOINTMENT (OUTPATIENT)
Dept: GENERAL RADIOLOGY | Age: 59
DRG: 880 | End: 2024-02-29
Payer: COMMERCIAL

## 2024-02-29 ENCOUNTER — HOSPITAL ENCOUNTER (INPATIENT)
Age: 59
LOS: 3 days | Discharge: HOME | DRG: 880 | End: 2024-03-03
Attending: STUDENT IN AN ORGANIZED HEALTH CARE EDUCATION/TRAINING PROGRAM | Admitting: STUDENT IN AN ORGANIZED HEALTH CARE EDUCATION/TRAINING PROGRAM
Payer: COMMERCIAL

## 2024-02-29 DIAGNOSIS — I10 ESSENTIAL HYPERTENSION: ICD-10-CM

## 2024-02-29 DIAGNOSIS — G89.29 ACUTE EXACERBATION OF CHRONIC LOW BACK PAIN: ICD-10-CM

## 2024-02-29 DIAGNOSIS — K80.62 CALCULUS OF GB AND BILE DUCT W ACUTE CHOLECYST W/O OBST: Primary | ICD-10-CM

## 2024-02-29 DIAGNOSIS — M54.50 ACUTE EXACERBATION OF CHRONIC LOW BACK PAIN: ICD-10-CM

## 2024-02-29 PROBLEM — K86.0 ALCOHOL-INDUCED CHRONIC PANCREATITIS (HCC): Status: ACTIVE | Noted: 2024-02-29

## 2024-02-29 PROBLEM — K29.20 CHRONIC ALCOHOLIC GASTRITIS: Status: ACTIVE | Noted: 2024-02-29

## 2024-02-29 PROBLEM — K81.9 CHOLECYSTITIS: Status: ACTIVE | Noted: 2024-02-29

## 2024-02-29 PROBLEM — D64.9 NORMOCYTIC ANEMIA: Status: ACTIVE | Noted: 2024-02-29

## 2024-02-29 PROBLEM — K81.0 ACUTE CHOLECYSTITIS: Status: ACTIVE | Noted: 2024-02-29

## 2024-02-29 PROBLEM — H02.401: Status: ACTIVE | Noted: 2024-02-29

## 2024-02-29 PROBLEM — N18.9 ACUTE KIDNEY INJURY SUPERIMPOSED ON CKD (HCC): Status: ACTIVE | Noted: 2018-09-25

## 2024-02-29 LAB
ALBUMIN SERPL BCG-MCNC: 3.8 G/DL (ref 3.5–5.1)
ALP SERPL-CCNC: 101 U/L (ref 38–126)
ALT SERPL W/O P-5'-P-CCNC: 16 U/L (ref 11–66)
AMPHETAMINES UR QL SCN: NEGATIVE
ANION GAP SERPL CALC-SCNC: 14 MEQ/L (ref 8–16)
APTT PPP: 44.9 SECONDS (ref 22–38)
AST SERPL-CCNC: 15 U/L (ref 5–40)
BACTERIA: ABNORMAL
BARBITURATES UR QL SCN: NEGATIVE
BASOPHILS ABSOLUTE: 0.1 THOU/MM3 (ref 0–0.1)
BASOPHILS NFR BLD AUTO: 1.1 %
BENZODIAZ UR QL SCN: NEGATIVE
BILIRUB CONJ SERPL-MCNC: < 0.2 MG/DL (ref 0–0.3)
BILIRUB SERPL-MCNC: 0.3 MG/DL (ref 0.3–1.2)
BILIRUB UR QL STRIP: NEGATIVE
BUN SERPL-MCNC: 52 MG/DL (ref 7–22)
BZE UR QL SCN: NEGATIVE
CALCIUM SERPL-MCNC: 8.4 MG/DL (ref 8.5–10.5)
CANNABINOIDS UR QL SCN: NEGATIVE
CASTS #/AREA URNS LPF: ABNORMAL /LPF
CASTS #/AREA URNS LPF: ABNORMAL /LPF
CHARACTER UR: ABNORMAL
CHARCOAL URNS QL MICRO: ABNORMAL
CHLORIDE SERPL-SCNC: 108 MEQ/L (ref 98–111)
CO2 SERPL-SCNC: 17 MEQ/L (ref 23–33)
COLOR UR: YELLOW
CREAT SERPL-MCNC: 3.8 MG/DL (ref 0.4–1.2)
CREAT UR-MCNC: 74.3 MG/DL
CRYSTALS URNS QL MICRO: ABNORMAL
DEPRECATED RDW RBC AUTO: 57.6 FL (ref 35–45)
DEPRECATED RDW RBC AUTO: 58.8 FL (ref 35–45)
EKG ATRIAL RATE: 63 BPM
EKG P AXIS: 90 DEGREES
EKG P-R INTERVAL: 188 MS
EKG Q-T INTERVAL: 432 MS
EKG QRS DURATION: 94 MS
EKG QTC CALCULATION (BAZETT): 442 MS
EKG R AXIS: 9 DEGREES
EKG T AXIS: 94 DEGREES
EKG VENTRICULAR RATE: 63 BPM
EOSINOPHIL NFR BLD AUTO: 3 %
EOSINOPHILS ABSOLUTE: 0.2 THOU/MM3 (ref 0–0.4)
EPITHELIAL CELLS, UA: ABNORMAL /HPF
ERYTHROCYTE [DISTWIDTH] IN BLOOD BY AUTOMATED COUNT: 16.1 % (ref 11.5–14.5)
ERYTHROCYTE [DISTWIDTH] IN BLOOD BY AUTOMATED COUNT: 16.4 % (ref 11.5–14.5)
ETHANOL SERPL-MCNC: 0.15 %
FENTANYL: NEGATIVE
FLUAV RNA RESP QL NAA+PROBE: NOT DETECTED
FLUBV RNA RESP QL NAA+PROBE: NOT DETECTED
GFR SERPL CREATININE-BSD FRML MDRD: 17 ML/MIN/1.73M2
GLUCOSE BLD STRIP.AUTO-MCNC: 206 MG/DL (ref 70–108)
GLUCOSE BLD STRIP.AUTO-MCNC: 256 MG/DL (ref 70–108)
GLUCOSE SERPL-MCNC: 207 MG/DL (ref 70–108)
GLUCOSE UR QL STRIP.AUTO: >= 1000 MG/DL
HCT VFR BLD AUTO: 29.9 % (ref 42–52)
HCT VFR BLD AUTO: 30.1 % (ref 42–52)
HEPARIN UNFRACTIONATED: 0.08 U/ML (ref 0.3–0.7)
HEPARIN UNFRACTIONATED: 0.1 U/ML (ref 0.3–0.7)
HGB BLD-MCNC: 8.9 GM/DL (ref 14–18)
HGB BLD-MCNC: 9 GM/DL (ref 14–18)
HGB UR QL STRIP.AUTO: NEGATIVE
IMM GRANULOCYTES # BLD AUTO: 0.12 THOU/MM3 (ref 0–0.07)
IMM GRANULOCYTES NFR BLD AUTO: 1.5 %
INR PPP: 2.81 (ref 0.85–1.13)
INR PPP: 2.82 (ref 0.85–1.13)
KETONES UR QL STRIP.AUTO: NEGATIVE
LEUKOCYTE ESTERASE UR QL STRIP.AUTO: NEGATIVE
LIPASE SERPL-CCNC: 109.3 U/L (ref 5.6–51.3)
LYMPHOCYTES ABSOLUTE: 0.9 THOU/MM3 (ref 1–4.8)
LYMPHOCYTES NFR BLD AUTO: 11.1 %
MCH RBC QN AUTO: 28.7 PG (ref 26–33)
MCH RBC QN AUTO: 29.5 PG (ref 26–33)
MCHC RBC AUTO-ENTMCNC: 29.6 GM/DL (ref 32.2–35.5)
MCHC RBC AUTO-ENTMCNC: 30.1 GM/DL (ref 32.2–35.5)
MCV RBC AUTO: 97.1 FL (ref 80–94)
MCV RBC AUTO: 98 FL (ref 80–94)
MONOCYTES ABSOLUTE: 0.5 THOU/MM3 (ref 0.4–1.3)
MONOCYTES NFR BLD AUTO: 6.4 %
NEUTROPHILS NFR BLD AUTO: 76.9 %
NITRITE UR QL STRIP.AUTO: NEGATIVE
NRBC BLD AUTO-RTO: 0 /100 WBC
NT-PROBNP SERPL IA-MCNC: ABNORMAL PG/ML (ref 0–124)
OPIATES UR QL SCN: POSITIVE
OSMOLALITY SERPL CALC.SUM OF ELEC: 297.6 MOSMOL/KG (ref 275–300)
OXYCODONE: NEGATIVE
PCP UR QL SCN: NEGATIVE
PH UR STRIP.AUTO: 5 [PH] (ref 5–9)
PLATELET # BLD AUTO: 181 THOU/MM3 (ref 130–400)
PLATELET # BLD AUTO: 181 THOU/MM3 (ref 130–400)
PMV BLD AUTO: 9.2 FL (ref 9.4–12.4)
PMV BLD AUTO: 9.9 FL (ref 9.4–12.4)
POTASSIUM SERPL-SCNC: 4.7 MEQ/L (ref 3.5–5.2)
PROT SERPL-MCNC: 6.5 G/DL (ref 6.1–8)
PROT UR STRIP.AUTO-MCNC: 300 MG/DL
RBC # BLD AUTO: 3.05 MILL/MM3 (ref 4.7–6.1)
RBC # BLD AUTO: 3.1 MILL/MM3 (ref 4.7–6.1)
RBC #/AREA URNS HPF: ABNORMAL /HPF
RENAL EPI CELLS #/AREA URNS HPF: ABNORMAL /[HPF]
SARS-COV-2 RNA RESP QL NAA+PROBE: NOT DETECTED
SEGMENTED NEUTROPHILS ABSOLUTE COUNT: 6.1 THOU/MM3 (ref 1.8–7.7)
SODIUM SERPL-SCNC: 139 MEQ/L (ref 135–145)
SODIUM UR-SCNC: < 20 MEQ/L
SPECIFIC GRAVITY UA: 1.02 (ref 1–1.03)
TROPONIN, HIGH SENSITIVITY: 269 NG/L (ref 0–12)
TROPONIN, HIGH SENSITIVITY: 277 NG/L (ref 0–12)
TROPONIN, HIGH SENSITIVITY: 308 NG/L (ref 0–12)
UROBILINOGEN, URINE: 0.2 EU/DL (ref 0–1)
UUN 24H UR-MCNC: 492 MG/DL
WBC # BLD AUTO: 7.9 THOU/MM3 (ref 4.8–10.8)
WBC # BLD AUTO: 9.6 THOU/MM3 (ref 4.8–10.8)
WBC #/AREA URNS HPF: ABNORMAL /HPF
YEAST LIKE FUNGI URNS QL MICRO: ABNORMAL

## 2024-02-29 PROCEDURE — 83690 ASSAY OF LIPASE: CPT

## 2024-02-29 PROCEDURE — 82948 REAGENT STRIP/BLOOD GLUCOSE: CPT

## 2024-02-29 PROCEDURE — 99285 EMERGENCY DEPT VISIT HI MDM: CPT

## 2024-02-29 PROCEDURE — C9113 INJ PANTOPRAZOLE SODIUM, VIA: HCPCS | Performed by: STUDENT IN AN ORGANIZED HEALTH CARE EDUCATION/TRAINING PROGRAM

## 2024-02-29 PROCEDURE — 82248 BILIRUBIN DIRECT: CPT

## 2024-02-29 PROCEDURE — 74176 CT ABD & PELVIS W/O CONTRAST: CPT

## 2024-02-29 PROCEDURE — 84484 ASSAY OF TROPONIN QUANT: CPT

## 2024-02-29 PROCEDURE — 6360000002 HC RX W HCPCS: Performed by: STUDENT IN AN ORGANIZED HEALTH CARE EDUCATION/TRAINING PROGRAM

## 2024-02-29 PROCEDURE — 2580000003 HC RX 258: Performed by: STUDENT IN AN ORGANIZED HEALTH CARE EDUCATION/TRAINING PROGRAM

## 2024-02-29 PROCEDURE — 96374 THER/PROPH/DIAG INJ IV PUSH: CPT

## 2024-02-29 PROCEDURE — 85025 COMPLETE CBC W/AUTO DIFF WBC: CPT

## 2024-02-29 PROCEDURE — 1200000003 HC TELEMETRY R&B

## 2024-02-29 PROCEDURE — 93005 ELECTROCARDIOGRAM TRACING: CPT | Performed by: STUDENT IN AN ORGANIZED HEALTH CARE EDUCATION/TRAINING PROGRAM

## 2024-02-29 PROCEDURE — 6370000000 HC RX 637 (ALT 250 FOR IP): Performed by: STUDENT IN AN ORGANIZED HEALTH CARE EDUCATION/TRAINING PROGRAM

## 2024-02-29 PROCEDURE — 82570 ASSAY OF URINE CREATININE: CPT

## 2024-02-29 PROCEDURE — 80307 DRUG TEST PRSMV CHEM ANLYZR: CPT

## 2024-02-29 PROCEDURE — 85027 COMPLETE CBC AUTOMATED: CPT

## 2024-02-29 PROCEDURE — 99222 1ST HOSP IP/OBS MODERATE 55: CPT | Performed by: INTERNAL MEDICINE

## 2024-02-29 PROCEDURE — 96375 TX/PRO/DX INJ NEW DRUG ADDON: CPT

## 2024-02-29 PROCEDURE — 83880 ASSAY OF NATRIURETIC PEPTIDE: CPT

## 2024-02-29 PROCEDURE — 99223 1ST HOSP IP/OBS HIGH 75: CPT | Performed by: SURGERY

## 2024-02-29 PROCEDURE — 85610 PROTHROMBIN TIME: CPT

## 2024-02-29 PROCEDURE — 96372 THER/PROPH/DIAG INJ SC/IM: CPT

## 2024-02-29 PROCEDURE — 84540 ASSAY OF URINE/UREA-N: CPT

## 2024-02-29 PROCEDURE — 76705 ECHO EXAM OF ABDOMEN: CPT

## 2024-02-29 PROCEDURE — 71046 X-RAY EXAM CHEST 2 VIEWS: CPT

## 2024-02-29 PROCEDURE — 82077 ASSAY SPEC XCP UR&BREATH IA: CPT

## 2024-02-29 PROCEDURE — 93010 ELECTROCARDIOGRAM REPORT: CPT | Performed by: INTERNAL MEDICINE

## 2024-02-29 PROCEDURE — 84300 ASSAY OF URINE SODIUM: CPT

## 2024-02-29 PROCEDURE — 85730 THROMBOPLASTIN TIME PARTIAL: CPT

## 2024-02-29 PROCEDURE — 36415 COLL VENOUS BLD VENIPUNCTURE: CPT

## 2024-02-29 PROCEDURE — 87636 SARSCOV2 & INF A&B AMP PRB: CPT

## 2024-02-29 PROCEDURE — 81001 URINALYSIS AUTO W/SCOPE: CPT

## 2024-02-29 PROCEDURE — 99223 1ST HOSP IP/OBS HIGH 75: CPT | Performed by: STUDENT IN AN ORGANIZED HEALTH CARE EDUCATION/TRAINING PROGRAM

## 2024-02-29 PROCEDURE — 85520 HEPARIN ASSAY: CPT

## 2024-02-29 PROCEDURE — 80053 COMPREHEN METABOLIC PANEL: CPT

## 2024-02-29 PROCEDURE — 96361 HYDRATE IV INFUSION ADD-ON: CPT

## 2024-02-29 RX ORDER — LORAZEPAM 2 MG/ML
2 INJECTION INTRAMUSCULAR
Status: ACTIVE | OUTPATIENT
Start: 2024-02-29 | End: 2024-03-01

## 2024-02-29 RX ORDER — MORPHINE SULFATE 4 MG/ML
4 INJECTION, SOLUTION INTRAMUSCULAR; INTRAVENOUS ONCE
Status: COMPLETED | OUTPATIENT
Start: 2024-02-29 | End: 2024-02-29

## 2024-02-29 RX ORDER — NICOTINE 21 MG/24HR
1 PATCH, TRANSDERMAL 24 HOURS TRANSDERMAL DAILY
Status: DISCONTINUED | OUTPATIENT
Start: 2024-02-29 | End: 2024-03-03 | Stop reason: HOSPADM

## 2024-02-29 RX ORDER — ACETAMINOPHEN 325 MG/1
650 TABLET ORAL EVERY 6 HOURS PRN
Status: DISCONTINUED | OUTPATIENT
Start: 2024-02-29 | End: 2024-03-03 | Stop reason: HOSPADM

## 2024-02-29 RX ORDER — SODIUM CHLORIDE 0.9 % (FLUSH) 0.9 %
5-40 SYRINGE (ML) INJECTION EVERY 12 HOURS SCHEDULED
Status: DISCONTINUED | OUTPATIENT
Start: 2024-02-29 | End: 2024-03-03 | Stop reason: HOSPADM

## 2024-02-29 RX ORDER — POTASSIUM CHLORIDE 7.45 MG/ML
10 INJECTION INTRAVENOUS PRN
Status: DISCONTINUED | OUTPATIENT
Start: 2024-02-29 | End: 2024-03-03 | Stop reason: HOSPADM

## 2024-02-29 RX ORDER — INSULIN GLARGINE 100 [IU]/ML
5 INJECTION, SOLUTION SUBCUTANEOUS NIGHTLY
Status: DISCONTINUED | OUTPATIENT
Start: 2024-02-29 | End: 2024-02-29

## 2024-02-29 RX ORDER — LANOLIN ALCOHOL/MO/W.PET/CERES
100 CREAM (GRAM) TOPICAL DAILY
Status: DISCONTINUED | OUTPATIENT
Start: 2024-02-29 | End: 2024-03-03 | Stop reason: HOSPADM

## 2024-02-29 RX ORDER — METOPROLOL SUCCINATE 100 MG/1
100 TABLET, EXTENDED RELEASE ORAL DAILY
Status: DISCONTINUED | OUTPATIENT
Start: 2024-02-29 | End: 2024-03-03 | Stop reason: HOSPADM

## 2024-02-29 RX ORDER — ACETAMINOPHEN 650 MG/1
650 SUPPOSITORY RECTAL EVERY 6 HOURS PRN
Status: DISCONTINUED | OUTPATIENT
Start: 2024-02-29 | End: 2024-03-03 | Stop reason: HOSPADM

## 2024-02-29 RX ORDER — HEPARIN SODIUM 1000 [USP'U]/ML
4000 INJECTION, SOLUTION INTRAVENOUS; SUBCUTANEOUS PRN
Status: DISCONTINUED | OUTPATIENT
Start: 2024-02-29 | End: 2024-03-03 | Stop reason: HOSPADM

## 2024-02-29 RX ORDER — LORAZEPAM 2 MG/1
4 TABLET ORAL
Status: ACTIVE | OUTPATIENT
Start: 2024-02-29 | End: 2024-03-01

## 2024-02-29 RX ORDER — POLYETHYLENE GLYCOL 3350 17 G/17G
17 POWDER, FOR SOLUTION ORAL DAILY PRN
Status: DISCONTINUED | OUTPATIENT
Start: 2024-02-29 | End: 2024-03-03 | Stop reason: HOSPADM

## 2024-02-29 RX ORDER — LORAZEPAM 2 MG/1
2 TABLET ORAL
Status: ACTIVE | OUTPATIENT
Start: 2024-02-29 | End: 2024-03-01

## 2024-02-29 RX ORDER — CLINDAMYCIN PHOSPHATE 900 MG/50ML
900 INJECTION, SOLUTION INTRAVENOUS ONCE
Status: COMPLETED | OUTPATIENT
Start: 2024-02-29 | End: 2024-02-29

## 2024-02-29 RX ORDER — MAGNESIUM SULFATE IN WATER 40 MG/ML
2000 INJECTION, SOLUTION INTRAVENOUS PRN
Status: DISCONTINUED | OUTPATIENT
Start: 2024-02-29 | End: 2024-03-03 | Stop reason: HOSPADM

## 2024-02-29 RX ORDER — LORAZEPAM 2 MG/ML
3 INJECTION INTRAMUSCULAR
Status: ACTIVE | OUTPATIENT
Start: 2024-02-29 | End: 2024-03-01

## 2024-02-29 RX ORDER — HYDRALAZINE HYDROCHLORIDE 50 MG/1
50 TABLET, FILM COATED ORAL 3 TIMES DAILY
Status: DISCONTINUED | OUTPATIENT
Start: 2024-02-29 | End: 2024-03-02

## 2024-02-29 RX ORDER — DICYCLOMINE HYDROCHLORIDE 10 MG/ML
20 INJECTION INTRAMUSCULAR ONCE
Status: COMPLETED | OUTPATIENT
Start: 2024-02-29 | End: 2024-02-29

## 2024-02-29 RX ORDER — INSULIN GLARGINE 100 [IU]/ML
5 INJECTION, SOLUTION SUBCUTANEOUS NIGHTLY
Status: DISCONTINUED | OUTPATIENT
Start: 2024-02-29 | End: 2024-03-03 | Stop reason: HOSPADM

## 2024-02-29 RX ORDER — LORAZEPAM 2 MG/ML
4 INJECTION INTRAMUSCULAR
Status: ACTIVE | OUTPATIENT
Start: 2024-02-29 | End: 2024-03-01

## 2024-02-29 RX ORDER — ALPRAZOLAM 0.5 MG/1
0.5 TABLET ORAL NIGHTLY PRN
Status: DISCONTINUED | OUTPATIENT
Start: 2024-02-29 | End: 2024-03-03 | Stop reason: HOSPADM

## 2024-02-29 RX ORDER — IBUPROFEN 600 MG/1
1 TABLET ORAL PRN
Status: DISCONTINUED | OUTPATIENT
Start: 2024-02-29 | End: 2024-03-03 | Stop reason: HOSPADM

## 2024-02-29 RX ORDER — INSULIN LISPRO 100 [IU]/ML
0-4 INJECTION, SOLUTION INTRAVENOUS; SUBCUTANEOUS
Status: DISCONTINUED | OUTPATIENT
Start: 2024-02-29 | End: 2024-03-03 | Stop reason: HOSPADM

## 2024-02-29 RX ORDER — POTASSIUM CHLORIDE 20 MEQ/1
40 TABLET, EXTENDED RELEASE ORAL PRN
Status: DISCONTINUED | OUTPATIENT
Start: 2024-02-29 | End: 2024-03-03 | Stop reason: HOSPADM

## 2024-02-29 RX ORDER — SODIUM CHLORIDE 0.9 % (FLUSH) 0.9 %
5-40 SYRINGE (ML) INJECTION PRN
Status: DISCONTINUED | OUTPATIENT
Start: 2024-02-29 | End: 2024-03-03 | Stop reason: HOSPADM

## 2024-02-29 RX ORDER — ISOSORBIDE MONONITRATE 60 MG/1
120 TABLET, EXTENDED RELEASE ORAL DAILY
Status: DISCONTINUED | OUTPATIENT
Start: 2024-03-01 | End: 2024-03-03 | Stop reason: HOSPADM

## 2024-02-29 RX ORDER — BUMETANIDE 1 MG/1
2 TABLET ORAL DAILY
Status: DISCONTINUED | OUTPATIENT
Start: 2024-02-29 | End: 2024-03-03 | Stop reason: HOSPADM

## 2024-02-29 RX ORDER — PHENOBARBITAL SODIUM 65 MG/ML
260 INJECTION, SOLUTION INTRAMUSCULAR; INTRAVENOUS
Status: DISCONTINUED | OUTPATIENT
Start: 2024-02-29 | End: 2024-02-29

## 2024-02-29 RX ORDER — 0.9 % SODIUM CHLORIDE 0.9 %
1000 INTRAVENOUS SOLUTION INTRAVENOUS ONCE
Status: COMPLETED | OUTPATIENT
Start: 2024-02-29 | End: 2024-02-29

## 2024-02-29 RX ORDER — AMIODARONE HYDROCHLORIDE 200 MG/1
200 TABLET ORAL DAILY
Status: DISCONTINUED | OUTPATIENT
Start: 2024-02-29 | End: 2024-03-03 | Stop reason: HOSPADM

## 2024-02-29 RX ORDER — METRONIDAZOLE 500 MG/100ML
500 INJECTION, SOLUTION INTRAVENOUS EVERY 8 HOURS
Status: DISCONTINUED | OUTPATIENT
Start: 2024-02-29 | End: 2024-03-02

## 2024-02-29 RX ORDER — HEPARIN SODIUM 1000 [USP'U]/ML
2000 INJECTION, SOLUTION INTRAVENOUS; SUBCUTANEOUS PRN
Status: DISCONTINUED | OUTPATIENT
Start: 2024-02-29 | End: 2024-03-03 | Stop reason: HOSPADM

## 2024-02-29 RX ORDER — ONDANSETRON 2 MG/ML
4 INJECTION INTRAMUSCULAR; INTRAVENOUS EVERY 6 HOURS PRN
Status: DISCONTINUED | OUTPATIENT
Start: 2024-02-29 | End: 2024-03-03 | Stop reason: HOSPADM

## 2024-02-29 RX ORDER — HEPARIN SODIUM 10000 [USP'U]/100ML
5-30 INJECTION, SOLUTION INTRAVENOUS CONTINUOUS
Status: DISCONTINUED | OUTPATIENT
Start: 2024-02-29 | End: 2024-03-03 | Stop reason: HOSPADM

## 2024-02-29 RX ORDER — DEXTROSE MONOHYDRATE 100 MG/ML
INJECTION, SOLUTION INTRAVENOUS CONTINUOUS PRN
Status: DISCONTINUED | OUTPATIENT
Start: 2024-02-29 | End: 2024-03-03 | Stop reason: HOSPADM

## 2024-02-29 RX ORDER — ONDANSETRON 4 MG/1
4 TABLET, ORALLY DISINTEGRATING ORAL EVERY 8 HOURS PRN
Status: DISCONTINUED | OUTPATIENT
Start: 2024-02-29 | End: 2024-03-03 | Stop reason: HOSPADM

## 2024-02-29 RX ORDER — SODIUM CHLORIDE 9 MG/ML
INJECTION, SOLUTION INTRAVENOUS PRN
Status: DISCONTINUED | OUTPATIENT
Start: 2024-02-29 | End: 2024-03-03 | Stop reason: HOSPADM

## 2024-02-29 RX ORDER — ATORVASTATIN CALCIUM 40 MG/1
40 TABLET, FILM COATED ORAL NIGHTLY
Status: DISCONTINUED | OUTPATIENT
Start: 2024-02-29 | End: 2024-03-03 | Stop reason: HOSPADM

## 2024-02-29 RX ORDER — LORAZEPAM 1 MG/1
1 TABLET ORAL
Status: ACTIVE | OUTPATIENT
Start: 2024-02-29 | End: 2024-03-01

## 2024-02-29 RX ORDER — INSULIN LISPRO 100 [IU]/ML
0-4 INJECTION, SOLUTION INTRAVENOUS; SUBCUTANEOUS NIGHTLY
Status: DISCONTINUED | OUTPATIENT
Start: 2024-02-29 | End: 2024-03-03 | Stop reason: HOSPADM

## 2024-02-29 RX ORDER — LORAZEPAM 2 MG/ML
1 INJECTION INTRAMUSCULAR
Status: ACTIVE | OUTPATIENT
Start: 2024-02-29 | End: 2024-03-01

## 2024-02-29 RX ORDER — DIAZEPAM 5 MG/1
5 TABLET ORAL ONCE
Status: COMPLETED | OUTPATIENT
Start: 2024-02-29 | End: 2024-02-29

## 2024-02-29 RX ORDER — PANTOPRAZOLE SODIUM 40 MG/10ML
40 INJECTION, POWDER, LYOPHILIZED, FOR SOLUTION INTRAVENOUS DAILY
Status: DISCONTINUED | OUTPATIENT
Start: 2024-02-29 | End: 2024-03-03 | Stop reason: HOSPADM

## 2024-02-29 RX ORDER — DICYCLOMINE HYDROCHLORIDE 10 MG/1
20 CAPSULE ORAL
Status: DISCONTINUED | OUTPATIENT
Start: 2024-02-29 | End: 2024-03-03 | Stop reason: HOSPADM

## 2024-02-29 RX ORDER — MEXILETINE HYDROCHLORIDE 150 MG/1
300 CAPSULE ORAL EVERY 8 HOURS SCHEDULED
Status: DISCONTINUED | OUTPATIENT
Start: 2024-02-29 | End: 2024-03-03 | Stop reason: HOSPADM

## 2024-02-29 RX ORDER — INSULIN GLARGINE 100 [IU]/ML
7 INJECTION, SOLUTION SUBCUTANEOUS NIGHTLY
Status: DISCONTINUED | OUTPATIENT
Start: 2024-02-29 | End: 2024-02-29

## 2024-02-29 RX ORDER — PHENOBARBITAL SODIUM 65 MG/ML
130 INJECTION, SOLUTION INTRAMUSCULAR; INTRAVENOUS
Status: DISCONTINUED | OUTPATIENT
Start: 2024-02-29 | End: 2024-02-29

## 2024-02-29 RX ORDER — ONDANSETRON 2 MG/ML
4 INJECTION INTRAMUSCULAR; INTRAVENOUS ONCE
Status: COMPLETED | OUTPATIENT
Start: 2024-02-29 | End: 2024-02-29

## 2024-02-29 RX ADMIN — HYDROMORPHONE HYDROCHLORIDE 1 MG: 1 INJECTION, SOLUTION INTRAMUSCULAR; INTRAVENOUS; SUBCUTANEOUS at 13:34

## 2024-02-29 RX ADMIN — ATORVASTATIN CALCIUM 40 MG: 40 TABLET, FILM COATED ORAL at 20:20

## 2024-02-29 RX ADMIN — HYDROMORPHONE HYDROCHLORIDE 1 MG: 1 INJECTION, SOLUTION INTRAMUSCULAR; INTRAVENOUS; SUBCUTANEOUS at 19:29

## 2024-02-29 RX ADMIN — DICYCLOMINE HYDROCHLORIDE 20 MG: 10 CAPSULE ORAL at 16:22

## 2024-02-29 RX ADMIN — MORPHINE SULFATE 4 MG: 4 INJECTION, SOLUTION INTRAMUSCULAR; INTRAVENOUS at 10:34

## 2024-02-29 RX ADMIN — PANTOPRAZOLE SODIUM 40 MG: 40 INJECTION, POWDER, FOR SOLUTION INTRAVENOUS at 10:34

## 2024-02-29 RX ADMIN — METOPROLOL SUCCINATE 100 MG: 100 TABLET, EXTENDED RELEASE ORAL at 16:14

## 2024-02-29 RX ADMIN — Medication: at 11:01

## 2024-02-29 RX ADMIN — ONDANSETRON 4 MG: 2 INJECTION INTRAMUSCULAR; INTRAVENOUS at 10:34

## 2024-02-29 RX ADMIN — CEFTRIAXONE SODIUM 2000 MG: 2 INJECTION, POWDER, FOR SOLUTION INTRAMUSCULAR; INTRAVENOUS at 18:44

## 2024-02-29 RX ADMIN — ONDANSETRON 4 MG: 4 TABLET, ORALLY DISINTEGRATING ORAL at 15:44

## 2024-02-29 RX ADMIN — DIAZEPAM 5 MG: 5 TABLET ORAL at 18:19

## 2024-02-29 RX ADMIN — HYDRALAZINE HYDROCHLORIDE 50 MG: 50 TABLET ORAL at 20:20

## 2024-02-29 RX ADMIN — HYDROMORPHONE HYDROCHLORIDE 1 MG: 1 INJECTION, SOLUTION INTRAMUSCULAR; INTRAVENOUS; SUBCUTANEOUS at 16:23

## 2024-02-29 RX ADMIN — METRONIDAZOLE 500 MG: 500 INJECTION, SOLUTION INTRAVENOUS at 18:45

## 2024-02-29 RX ADMIN — DICYCLOMINE HYDROCHLORIDE 20 MG: 10 CAPSULE ORAL at 20:20

## 2024-02-29 RX ADMIN — HYDROMORPHONE HYDROCHLORIDE 1 MG: 1 INJECTION, SOLUTION INTRAMUSCULAR; INTRAVENOUS; SUBCUTANEOUS at 22:52

## 2024-02-29 RX ADMIN — CLINDAMYCIN PHOSPHATE 900 MG: 900 INJECTION, SOLUTION INTRAVENOUS at 16:13

## 2024-02-29 RX ADMIN — SODIUM CHLORIDE 1000 ML: 9 INJECTION, SOLUTION INTRAVENOUS at 10:33

## 2024-02-29 RX ADMIN — HEPARIN SODIUM 8 UNITS/KG/HR: 10000 INJECTION, SOLUTION INTRAVENOUS at 17:46

## 2024-02-29 RX ADMIN — ALPRAZOLAM 0.5 MG: 0.5 TABLET ORAL at 20:20

## 2024-02-29 RX ADMIN — INSULIN LISPRO 2 UNITS: 100 INJECTION, SOLUTION INTRAVENOUS; SUBCUTANEOUS at 17:51

## 2024-02-29 RX ADMIN — INSULIN GLARGINE 5 UNITS: 100 INJECTION, SOLUTION SUBCUTANEOUS at 20:20

## 2024-02-29 RX ADMIN — DICYCLOMINE HYDROCHLORIDE 20 MG: 10 INJECTION, SOLUTION INTRAMUSCULAR at 12:58

## 2024-02-29 RX ADMIN — HYDROMORPHONE HYDROCHLORIDE 0.5 MG: 1 INJECTION, SOLUTION INTRAMUSCULAR; INTRAVENOUS; SUBCUTANEOUS at 11:44

## 2024-02-29 RX ADMIN — HYDRALAZINE HYDROCHLORIDE 50 MG: 50 TABLET ORAL at 16:14

## 2024-02-29 ASSESSMENT — PAIN - FUNCTIONAL ASSESSMENT
PAIN_FUNCTIONAL_ASSESSMENT: ACTIVITIES ARE NOT PREVENTED

## 2024-02-29 ASSESSMENT — PAIN SCALES - GENERAL
PAINLEVEL_OUTOF10: 8
PAINLEVEL_OUTOF10: 8
PAINLEVEL_OUTOF10: 9
PAINLEVEL_OUTOF10: 2
PAINLEVEL_OUTOF10: 9

## 2024-02-29 ASSESSMENT — PAIN DESCRIPTION - DESCRIPTORS
DESCRIPTORS: ACHING
DESCRIPTORS: ACHING;CRUSHING
DESCRIPTORS: DULL
DESCRIPTORS: DULL;DISCOMFORT

## 2024-02-29 ASSESSMENT — PAIN DESCRIPTION - LOCATION
LOCATION: ABDOMEN

## 2024-02-29 NOTE — ED PROVIDER NOTES
0.9 % 50 mL IVPB (mini-bag) (0 mg IntraVENous Stopped 2/29/24 1920)   metroNIDAZOLE (FLAGYL) 500 mg in 0.9% NaCl 100 mL IVPB premix (0 mg IntraVENous Stopped 2/29/24 2014)   heparin (porcine) injection 4,000 Units (has no administration in time range)   heparin (porcine) injection 2,000 Units (has no administration in time range)   heparin 25,000 units in dextrose 5% 250 mL (premix) infusion (0 Units/kg/hr × 124 kg IntraVENous Stopped 2/29/24 1808)   sodium chloride flush 0.9 % injection 5-40 mL (has no administration in time range)   sodium chloride flush 0.9 % injection 5-40 mL (has no administration in time range)   0.9 % sodium chloride infusion (has no administration in time range)   thiamine tablet 100 mg (has no administration in time range)   LORazepam (ATIVAN) tablet 1 mg (has no administration in time range)     Or   LORazepam (ATIVAN) injection 1 mg (has no administration in time range)     Or   LORazepam (ATIVAN) tablet 2 mg (has no administration in time range)     Or   LORazepam (ATIVAN) injection 2 mg (has no administration in time range)     Or   LORazepam (ATIVAN) tablet 3 mg (has no administration in time range)     Or   LORazepam (ATIVAN) injection 3 mg (has no administration in time range)     Or   LORazepam (ATIVAN) tablet 4 mg (has no administration in time range)     Or   LORazepam (ATIVAN) injection 4 mg (has no administration in time range)   mexiletine (MEXITIL) capsule 300 mg (300 mg Oral Not Given 2/29/24 2015)   sodium chloride 0.9 % bolus 1,000 mL (0 mLs IntraVENous Stopped 2/29/24 1116)   ondansetron (ZOFRAN) injection 4 mg (4 mg IntraVENous Given 2/29/24 1034)   morphine injection 4 mg (4 mg IntraVENous Given 2/29/24 1034)   aluminum & magnesium hydroxide-simethicone (MAALOX) 30 mL, lidocaine viscous hcl (XYLOCAINE) 5 mL (GI COCKTAIL) ( Oral Given 2/29/24 1101)   HYDROmorphone (DILAUDID) injection 0.5 mg (0.5 mg IntraVENous Given 2/29/24 1144)   dicyclomine (BENTYL) injection 20 mg

## 2024-02-29 NOTE — ED NOTES
Patient to ED via EMS for severe abd pain. Patient reports drinking heavily the last 4 days. Patient last drink was last night around mid night. Patient rolling around in bed rating pain 10/10 with a burning sensation. Patient is alert and oriented

## 2024-02-29 NOTE — ED NOTES
Pt transported to [facility] by cart in stable condition.   Called [facility] and informed Kristyn that the patient was on their way to the unit.

## 2024-02-29 NOTE — ED NOTES
ED to inpatient nurses report      Chief Complaint:  Chief Complaint   Patient presents with    Abdominal Pain    Alcohol Problem     Present to ED from: home    MOA:     LOC: alert and orientated to name, place, date  Mobility: Requires assistance * 2  Oxygen Baseline: RA    Current needs required: 4L NC after receiving narcotics      Code Status:   Prior    What abnormal results were found and what did you give/do to treat them? abd  Any procedures or intervention occur? Cholecystis     Mental Status:  Level of Consciousness: Alert (0)    Psych Assessment:        Vitals:  Patient Vitals for the past 24 hrs:   BP Temp Pulse Resp SpO2 Height Weight   02/29/24 1301 (!) 164/87 -- 78 14 95 % -- --   02/29/24 1207 -- -- -- 20 94 % -- --   02/29/24 1201 (!) 159/94 -- 75 20 (!) 87 % -- --   02/29/24 1027 (!) 169/81 -- 63 -- -- -- --   02/29/24 1000 -- 98.3 °F (36.8 °C) 74 28 -- 1.88 m (6' 2\") 124 kg (273 lb 5.9 oz)        LDAs:   Peripheral IV 02/29/24 Left Antecubital (Active)   Site Assessment Clean, dry & intact 02/29/24 1008       Ambulatory Status:  No data recorded    Diagnosis:  DISPOSITION Admitted 02/29/2024 01:30:38 PM   Final diagnoses:   Calculus of GB and bile duct w acute cholecyst w/o obst        Consults:  IP CONSULT TO GENERAL SURGERY     Pain Score:       C-SSRS:   Risk of Suicide: No Risk    Sepsis Screening:  Sepsis Risk Score: 2.15    Melissa Fall Risk:       Swallow Screening        Preferred Language:   English      ALLERGIES     Latex, Ativan [lorazepam], Pcn [penicillins], and Zoloft [sertraline hcl]    SURGICAL HISTORY       Past Surgical History:   Procedure Laterality Date    CARDIAC CATHETERIZATION  03/20/2014    [facility]    CARDIAC DEFIBRILLATOR PLACEMENT  10/13/2015    [employer] [place], MRI CONDITIONAL ICD    CARDIAC ELECTROPHYSIOLOGY STUDY AND ABLATION      CORONARY ARTERY BYPASS GRAFT  05/09/2014    3 bypass    EKG 12-LEAD  07/17/2015         OTHER SURGICAL HISTORY Left 10/21/2014    Left

## 2024-02-29 NOTE — H&P
cephalic, atraumatic without obvious deformity. Pupils equal, round, and reactive to light.  Extra ocular muscles intact. Conjunctivae/corneas clear.  Neck: Supple, with full range of motion. No jugular venous distention. Trachea midline.  Respiratory:  Normal respiratory effort. Clear to auscultation, bilaterally without Rales/Wheezes/Rhonchi.  Cardiovascular: Regular rate and rhythm with normal S1/S2 without murmurs, rubs or gallops.  Abdomen: Soft, tender in upper abdominal region, distended with normal bowel sounds.  Musculoskeletal:  No clubbing, cyanosis or edema bilaterally.  Skin: Skin color, texture, turgor normal.  No rashes or lesions.  Neurologic:  Neurovascularly intact without any focal sensory/motor deficits. Cranial nerves: II-XII intact, grossly non-focal except for right eye ptosis.  Psychiatric: Alert and oriented, thought content appropriate, normal insight    Labs:   Recent Labs     02/29/24  1015   WBC 7.9   HGB 8.9*   HCT 30.1*        Recent Labs     02/29/24  1015      K 4.7      CO2 17*   BUN 52*   CREATININE 3.8*   CALCIUM 8.4*     Recent Labs     02/29/24  1015   AST 15   ALT 16   BILIDIR <0.2   BILITOT 0.3   ALKPHOS 101     Recent Labs     02/29/24  1015   INR 2.81*     No results for input(s): \"CKTOTAL\", \"TROPONINI\" in the last 72 hours.    Urinalysis:    Lab Results   Component Value Date/Time    NITRU NEGATIVE 01/02/2024 04:40 PM    WBCUA 15-25W/CLUMPS 01/02/2024 04:40 PM    BACTERIA NONE SEEN 01/02/2024 04:40 PM    RBCUA > 100 01/02/2024 04:40 PM    BLOODU LARGE 01/02/2024 04:40 PM    SPECGRAV 1.016 01/02/2024 04:40 PM    GLUCOSEU 500 11/29/2023 10:30 PM       Radiology:   US GALLBLADDER RUQ   Final Result   1. Gallbladder wall thickening with gallstones and positive sonographic Juárez's sign. Findings reflect sonographic evidence of acute cholecystitis.            **This report has been created using voice recognition software.  It may contain minor errors which

## 2024-03-01 ENCOUNTER — APPOINTMENT (OUTPATIENT)
Dept: NUCLEAR MEDICINE | Age: 59
DRG: 880 | End: 2024-03-01
Payer: COMMERCIAL

## 2024-03-01 ENCOUNTER — APPOINTMENT (OUTPATIENT)
Dept: ULTRASOUND IMAGING | Age: 59
DRG: 880 | End: 2024-03-01
Payer: COMMERCIAL

## 2024-03-01 PROBLEM — E87.5 HYPERKALEMIA: Status: ACTIVE | Noted: 2024-03-01

## 2024-03-01 PROBLEM — K80.62: Status: ACTIVE | Noted: 2024-02-29

## 2024-03-01 LAB
ANION GAP SERPL CALC-SCNC: 12 MEQ/L (ref 8–16)
BUN SERPL-MCNC: 54 MG/DL (ref 7–22)
CALCIUM SERPL-MCNC: 8.7 MG/DL (ref 8.5–10.5)
CHLORIDE SERPL-SCNC: 107 MEQ/L (ref 98–111)
CO2 SERPL-SCNC: 17 MEQ/L (ref 23–33)
CREAT SERPL-MCNC: 3.8 MG/DL (ref 0.4–1.2)
GFR SERPL CREATININE-BSD FRML MDRD: 17 ML/MIN/1.73M2
GLUCOSE BLD STRIP.AUTO-MCNC: 173 MG/DL (ref 70–108)
GLUCOSE BLD STRIP.AUTO-MCNC: 188 MG/DL (ref 70–108)
GLUCOSE BLD STRIP.AUTO-MCNC: 206 MG/DL (ref 70–108)
GLUCOSE BLD STRIP.AUTO-MCNC: 248 MG/DL (ref 70–108)
GLUCOSE SERPL-MCNC: 175 MG/DL (ref 70–108)
HEPARIN UNFRACTIONATED: 0.05 U/ML (ref 0.3–0.7)
HEPARIN UNFRACTIONATED: 0.08 U/ML (ref 0.3–0.7)
HEPARIN UNFRACTIONATED: < 0.04 U/ML (ref 0.3–0.7)
INR PPP: 3.33 (ref 0.85–1.13)
POTASSIUM SERPL-SCNC: 5.8 MEQ/L (ref 3.5–5.2)
POTASSIUM SERPL-SCNC: 5.8 MEQ/L (ref 3.5–5.2)
SODIUM SERPL-SCNC: 136 MEQ/L (ref 135–145)

## 2024-03-01 PROCEDURE — 85610 PROTHROMBIN TIME: CPT

## 2024-03-01 PROCEDURE — 2580000003 HC RX 258: Performed by: STUDENT IN AN ORGANIZED HEALTH CARE EDUCATION/TRAINING PROGRAM

## 2024-03-01 PROCEDURE — 92610 EVALUATE SWALLOWING FUNCTION: CPT

## 2024-03-01 PROCEDURE — 99232 SBSQ HOSP IP/OBS MODERATE 35: CPT | Performed by: INTERNAL MEDICINE

## 2024-03-01 PROCEDURE — 6370000000 HC RX 637 (ALT 250 FOR IP): Performed by: INTERNAL MEDICINE

## 2024-03-01 PROCEDURE — 3430000000 HC RX DIAGNOSTIC RADIOPHARMACEUTICAL: Performed by: STUDENT IN AN ORGANIZED HEALTH CARE EDUCATION/TRAINING PROGRAM

## 2024-03-01 PROCEDURE — 78226 HEPATOBILIARY SYSTEM IMAGING: CPT

## 2024-03-01 PROCEDURE — 6370000000 HC RX 637 (ALT 250 FOR IP): Performed by: STUDENT IN AN ORGANIZED HEALTH CARE EDUCATION/TRAINING PROGRAM

## 2024-03-01 PROCEDURE — 99233 SBSQ HOSP IP/OBS HIGH 50: CPT | Performed by: SURGERY

## 2024-03-01 PROCEDURE — C9113 INJ PANTOPRAZOLE SODIUM, VIA: HCPCS | Performed by: STUDENT IN AN ORGANIZED HEALTH CARE EDUCATION/TRAINING PROGRAM

## 2024-03-01 PROCEDURE — 6360000002 HC RX W HCPCS: Performed by: RADIOLOGY

## 2024-03-01 PROCEDURE — 85520 HEPARIN ASSAY: CPT

## 2024-03-01 PROCEDURE — 2580000003 HC RX 258: Performed by: INTERNAL MEDICINE

## 2024-03-01 PROCEDURE — A9537 TC99M MEBROFENIN: HCPCS | Performed by: STUDENT IN AN ORGANIZED HEALTH CARE EDUCATION/TRAINING PROGRAM

## 2024-03-01 PROCEDURE — 76770 US EXAM ABDO BACK WALL COMP: CPT

## 2024-03-01 PROCEDURE — 84132 ASSAY OF SERUM POTASSIUM: CPT

## 2024-03-01 PROCEDURE — 6360000002 HC RX W HCPCS: Performed by: STUDENT IN AN ORGANIZED HEALTH CARE EDUCATION/TRAINING PROGRAM

## 2024-03-01 PROCEDURE — 6370000000 HC RX 637 (ALT 250 FOR IP)

## 2024-03-01 PROCEDURE — 80048 BASIC METABOLIC PNL TOTAL CA: CPT

## 2024-03-01 PROCEDURE — 82948 REAGENT STRIP/BLOOD GLUCOSE: CPT

## 2024-03-01 PROCEDURE — 99233 SBSQ HOSP IP/OBS HIGH 50: CPT | Performed by: INTERNAL MEDICINE

## 2024-03-01 PROCEDURE — 1200000003 HC TELEMETRY R&B

## 2024-03-01 PROCEDURE — 36415 COLL VENOUS BLD VENIPUNCTURE: CPT

## 2024-03-01 RX ORDER — SODIUM CHLORIDE 9 MG/ML
INJECTION, SOLUTION INTRAVENOUS CONTINUOUS
Status: DISCONTINUED | OUTPATIENT
Start: 2024-03-01 | End: 2024-03-02

## 2024-03-01 RX ORDER — DEXTROSE MONOHYDRATE 25 G/50ML
25 INJECTION, SOLUTION INTRAVENOUS ONCE
Status: DISCONTINUED | OUTPATIENT
Start: 2024-03-01 | End: 2024-03-01

## 2024-03-01 RX ORDER — MORPHINE SULFATE 4 MG/ML
4 INJECTION, SOLUTION INTRAMUSCULAR; INTRAVENOUS ONCE
Status: COMPLETED | OUTPATIENT
Start: 2024-03-01 | End: 2024-03-01

## 2024-03-01 RX ORDER — DIAZEPAM 5 MG/1
5 TABLET ORAL ONCE
Status: COMPLETED | OUTPATIENT
Start: 2024-03-01 | End: 2024-03-01

## 2024-03-01 RX ADMIN — ISOSORBIDE MONONITRATE 120 MG: 60 TABLET, EXTENDED RELEASE ORAL at 08:18

## 2024-03-01 RX ADMIN — HYDROMORPHONE HYDROCHLORIDE 1 MG: 1 INJECTION, SOLUTION INTRAMUSCULAR; INTRAVENOUS; SUBCUTANEOUS at 15:50

## 2024-03-01 RX ADMIN — HYDRALAZINE HYDROCHLORIDE 50 MG: 50 TABLET ORAL at 20:49

## 2024-03-01 RX ADMIN — METOPROLOL SUCCINATE 100 MG: 100 TABLET, EXTENDED RELEASE ORAL at 08:18

## 2024-03-01 RX ADMIN — ATORVASTATIN CALCIUM 40 MG: 40 TABLET, FILM COATED ORAL at 20:49

## 2024-03-01 RX ADMIN — METRONIDAZOLE 500 MG: 500 INJECTION, SOLUTION INTRAVENOUS at 03:06

## 2024-03-01 RX ADMIN — METRONIDAZOLE 500 MG: 500 INJECTION, SOLUTION INTRAVENOUS at 10:15

## 2024-03-01 RX ADMIN — HYDRALAZINE HYDROCHLORIDE 50 MG: 50 TABLET ORAL at 08:17

## 2024-03-01 RX ADMIN — METRONIDAZOLE 500 MG: 500 INJECTION, SOLUTION INTRAVENOUS at 18:52

## 2024-03-01 RX ADMIN — MEXILETINE HYDROCHLORIDE 300 MG: 150 CAPSULE ORAL at 15:49

## 2024-03-01 RX ADMIN — MORPHINE SULFATE 4 MG: 4 INJECTION, SOLUTION INTRAMUSCULAR; INTRAVENOUS at 13:45

## 2024-03-01 RX ADMIN — PANTOPRAZOLE SODIUM 40 MG: 40 INJECTION, POWDER, FOR SOLUTION INTRAVENOUS at 08:18

## 2024-03-01 RX ADMIN — HYDROMORPHONE HYDROCHLORIDE 1 MG: 1 INJECTION, SOLUTION INTRAMUSCULAR; INTRAVENOUS; SUBCUTANEOUS at 06:00

## 2024-03-01 RX ADMIN — CEFTRIAXONE SODIUM 2000 MG: 2 INJECTION, POWDER, FOR SOLUTION INTRAMUSCULAR; INTRAVENOUS at 18:53

## 2024-03-01 RX ADMIN — SODIUM CHLORIDE: 9 INJECTION, SOLUTION INTRAVENOUS at 15:52

## 2024-03-01 RX ADMIN — SODIUM CHLORIDE, PRESERVATIVE FREE 10 ML: 5 INJECTION INTRAVENOUS at 01:08

## 2024-03-01 RX ADMIN — HYDROMORPHONE HYDROCHLORIDE 1 MG: 1 INJECTION, SOLUTION INTRAMUSCULAR; INTRAVENOUS; SUBCUTANEOUS at 22:06

## 2024-03-01 RX ADMIN — INSULIN GLARGINE 5 UNITS: 100 INJECTION, SOLUTION SUBCUTANEOUS at 22:03

## 2024-03-01 RX ADMIN — SODIUM CHLORIDE, PRESERVATIVE FREE 10 ML: 5 INJECTION INTRAVENOUS at 08:17

## 2024-03-01 RX ADMIN — HYDRALAZINE HYDROCHLORIDE 50 MG: 50 TABLET ORAL at 15:50

## 2024-03-01 RX ADMIN — DIAZEPAM 5 MG: 5 TABLET ORAL at 01:21

## 2024-03-01 RX ADMIN — Medication 100 MG: at 00:12

## 2024-03-01 RX ADMIN — DICYCLOMINE HYDROCHLORIDE 20 MG: 10 CAPSULE ORAL at 20:49

## 2024-03-01 RX ADMIN — HYDROMORPHONE HYDROCHLORIDE 1 MG: 1 INJECTION, SOLUTION INTRAMUSCULAR; INTRAVENOUS; SUBCUTANEOUS at 18:47

## 2024-03-01 RX ADMIN — HYDROMORPHONE HYDROCHLORIDE 1 MG: 1 INJECTION, SOLUTION INTRAMUSCULAR; INTRAVENOUS; SUBCUTANEOUS at 03:00

## 2024-03-01 RX ADMIN — Medication 100 MG: at 08:17

## 2024-03-01 RX ADMIN — SODIUM ZIRCONIUM CYCLOSILICATE 10 G: 10 POWDER, FOR SUSPENSION ORAL at 14:28

## 2024-03-01 RX ADMIN — INSULIN LISPRO 1 UNITS: 100 INJECTION, SOLUTION INTRAVENOUS; SUBCUTANEOUS at 15:50

## 2024-03-01 RX ADMIN — SODIUM CHLORIDE, PRESERVATIVE FREE 10 ML: 5 INJECTION INTRAVENOUS at 00:13

## 2024-03-01 RX ADMIN — DICYCLOMINE HYDROCHLORIDE 20 MG: 10 CAPSULE ORAL at 15:50

## 2024-03-01 RX ADMIN — AMIODARONE HYDROCHLORIDE 200 MG: 200 TABLET ORAL at 08:17

## 2024-03-01 RX ADMIN — DICYCLOMINE HYDROCHLORIDE 20 MG: 10 CAPSULE ORAL at 06:58

## 2024-03-01 RX ADMIN — Medication 7.7 MILLICURIE: at 12:35

## 2024-03-01 ASSESSMENT — PAIN - FUNCTIONAL ASSESSMENT
PAIN_FUNCTIONAL_ASSESSMENT: ACTIVITIES ARE NOT PREVENTED

## 2024-03-01 ASSESSMENT — PAIN DESCRIPTION - LOCATION
LOCATION: ABDOMEN

## 2024-03-01 ASSESSMENT — PAIN DESCRIPTION - ORIENTATION
ORIENTATION: RIGHT;LEFT
ORIENTATION: MID
ORIENTATION: RIGHT;LEFT

## 2024-03-01 ASSESSMENT — PAIN DESCRIPTION - DESCRIPTORS
DESCRIPTORS: ACHING
DESCRIPTORS: ACHING
DESCRIPTORS: ACHING;CRUSHING
DESCRIPTORS: ACHING
DESCRIPTORS: STABBING

## 2024-03-01 ASSESSMENT — PAIN SCALES - GENERAL
PAINLEVEL_OUTOF10: 8
PAINLEVEL_OUTOF10: 9
PAINLEVEL_OUTOF10: 9
PAINLEVEL_OUTOF10: 10
PAINLEVEL_OUTOF10: 9

## 2024-03-01 NOTE — CONSULTS
0943- Arrived to pt room to complete AOD consult. Pt refused consult.   
Q24H    metroNIDAZOLE  500 mg IntraVENous Q8H    sodium chloride flush  5-40 mL IntraVENous 2 times per day    thiamine  100 mg Oral Daily    mexiletine  300 mg Oral 3 times per day     Continuous Infusions:   sodium chloride      dextrose      [Held by provider] heparin (PORCINE) Infusion Stopped (02/29/24 1808)    sodium chloride         Review of Systems  Review of Systems   Constitutional:  Negative for activity change, appetite change and fatigue.   HENT: Negative.     Eyes: Negative.    Respiratory:  Positive for chest tightness and shortness of breath. Negative for cough.    Cardiovascular:  Positive for chest pain. Negative for leg swelling.   Gastrointestinal:  Positive for abdominal pain. Negative for diarrhea, nausea and vomiting.   Genitourinary:  Negative for difficulty urinating, dysuria, flank pain and frequency.   Musculoskeletal:  Negative for back pain and neck pain.   Skin:  Negative for rash and wound.   Neurological:  Negative for dizziness, light-headedness and headaches.   Psychiatric/Behavioral:  Negative for agitation and confusion.     Physical Exam  Physical Exam  Constitutional:       General: He is not in acute distress.     Appearance: Normal appearance.   HENT:      Head: Normocephalic and atraumatic.      Right Ear: External ear normal.      Left Ear: External ear normal.      Nose: Nose normal.      Mouth/Throat:      Mouth: Mucous membranes are moist.   Eyes:      General: No scleral icterus.        Right eye: No discharge.         Left eye: No discharge.      Conjunctiva/sclera: Conjunctivae normal.   Cardiovascular:      Rate and Rhythm: Normal rate and regular rhythm.      Heart sounds: Normal heart sounds.   Pulmonary:      Effort: Pulmonary effort is normal. No respiratory distress.      Breath sounds: Normal breath sounds. No wheezing or rales.   Abdominal:      General: Bowel sounds are normal. There is distension.      Palpations: Abdomen is soft.      Tenderness: There is 
  Final report electronically signed by Dr Nevin Gutierrez on 2/29/2024 12:57 PM      CT ABDOMEN PELVIS WO CONTRAST Additional Contrast? None   Final Result   1. Normal appendix. Colonic diverticulosis without diverticulitis. No bowel obstruction.      2. Cholelithiasis is present. No biliary ductal dilatation is identified however questionable gallbladder wall thickening and pericholecystic fat stranding is observed. Correlate clinically for acute cholecystitis.      3. Small bilateral pleural effusions and mild patchy groundglass opacities noted at the limited images through the lung bases.      4. The urinary bladder is mildly distended. Correlate with urinalysis. No obstructive uropathy is visualized. Numerous chronic findings are discussed.            **This report has been created using voice recognition software.  It may contain minor errors which are inherent in voice recognition technology.**      Final report electronically signed by Dr Analilia Goldman on 2/29/2024 11:44 AM      XR CHEST (2 VW)   Final Result   1. No acute cardiopulmonary finding.   2. Marked cardiomegaly.            **This report has been created using voice recognition software.  It may contain minor errors which are inherent in voice recognition technology.**      Final report electronically signed by Dr Nevin Gutierrez on 2/29/2024 10:57 AM      US RENAL COMPLETE    (Results Pending)   MRI BRAIN WO CONTRAST    (Results Pending)     Imaging independently reviewed      Electronically signed by Queta Vaughn MD on 2/29/2024 at 3:03 PM

## 2024-03-01 NOTE — CARE COORDINATION
Case Management Assessment  Initial Evaluation    Date/Time of Evaluation: 3/1/2024 11:25 AM  Assessment Completed by: Armand Armendariz RN    If patient is discharged prior to next notation, then this note serves as note for discharge by case management.    Patient Name: Garrett Duncan                   YOB: 1965  Diagnosis: Cholecystitis [K81.9]  Calculus of GB and bile duct w acute cholecyst w/o obst [K80.62]                   Date / Time: 2/29/2024  9:54 AM  Location: [facility]     Patient Admission Status: Inpatient   Readmission Risk Low 0-14, Mod 15-19), High > 20: Readmission Risk Score: 28    Current PCP: Leonel Adorno, ISABELLA - CNP  PCP verified by CM? Yes    Chart Reviewed: Yes      History Provided by: Patient  Patient Orientation: Alert and Oriented    Patient Cognition: Alert    Hospitalization in the last 30 days (Readmission):  No    If yes, Readmission Assessment in CM Navigator will be completed.    Advance Directives:      Code Status: Full Code   Patient's Primary Decision Maker is: Named in Scanned ACP Document    Primary Decision Maker: Jesica Duncan ([place]mera) - Child - [phone]    Secondary Decision Maker: Lou Duncan - Child - [phone]    Discharge Planning:    Patient lives with: Alone Type of Home: Apartment  Primary Care Giver: Self  Patient Support Systems include: Family Members   Current Financial resources: Other (Comment) (commercial)  Current community resources: None  Current services prior to admission: None            Current DME:              Type of Home Care services:  None    ADLS  Prior functional level: Independent in ADLs/IADLs  Current functional level: Independent in ADLs/IADLs    Family can provide assistance at DC: Yes  Would you like Case Management to discuss the discharge plan with any other family members/significant others, and if so, who? No  Plans to Return to Present Housing: Yes  Other Identified Issues/Barriers to RETURNING to current

## 2024-03-01 NOTE — PLAN OF CARE
Problem: Discharge Planning  Goal: Discharge to home or other facility with appropriate resources  3/1/2024 1014 by Divya Stapleton, MSW, LSW  Outcome: Completed  Flowsheets (Taken 3/1/2024 1014)  Discharge to home or other facility with appropriate resources: Identify barriers to discharge with patient and caregiver  Note: Return home, resources available if needed, see SW notes 3/1/24.

## 2024-03-01 NOTE — CARE COORDINATION
3/1/24, 10:13 AM EST    DISCHARGE PLANNING EVALUATION     SW consult received, Patient refused AOD assessment and resources, no other needs identified, SW consult deferred at this time.

## 2024-03-02 LAB
ALBUMIN SERPL BCG-MCNC: 3.7 G/DL (ref 3.5–5.1)
ALP SERPL-CCNC: 100 U/L (ref 38–126)
ALT SERPL W/O P-5'-P-CCNC: 16 U/L (ref 11–66)
ANION GAP SERPL CALC-SCNC: 15 MEQ/L (ref 8–16)
AST SERPL-CCNC: 20 U/L (ref 5–40)
BILIRUB CONJ SERPL-MCNC: < 0.2 MG/DL (ref 0–0.3)
BILIRUB SERPL-MCNC: 0.5 MG/DL (ref 0.3–1.2)
BUN SERPL-MCNC: 54 MG/DL (ref 7–22)
CALCIUM SERPL-MCNC: 8.6 MG/DL (ref 8.5–10.5)
CHLORIDE SERPL-SCNC: 106 MEQ/L (ref 98–111)
CO2 SERPL-SCNC: 14 MEQ/L (ref 23–33)
CREAT SERPL-MCNC: 3.8 MG/DL (ref 0.4–1.2)
DEPRECATED RDW RBC AUTO: 61.1 FL (ref 35–45)
ERYTHROCYTE [DISTWIDTH] IN BLOOD BY AUTOMATED COUNT: 16.4 % (ref 11.5–14.5)
GFR SERPL CREATININE-BSD FRML MDRD: 17 ML/MIN/1.73M2
GLUCOSE BLD STRIP.AUTO-MCNC: 119 MG/DL (ref 70–108)
GLUCOSE BLD STRIP.AUTO-MCNC: 148 MG/DL (ref 70–108)
GLUCOSE BLD STRIP.AUTO-MCNC: 161 MG/DL (ref 70–108)
GLUCOSE BLD STRIP.AUTO-MCNC: 183 MG/DL (ref 70–108)
GLUCOSE SERPL-MCNC: 110 MG/DL (ref 70–108)
HCT VFR BLD AUTO: 29.2 % (ref 42–52)
HEPARIN UNFRACTIONATED: 0.05 U/ML (ref 0.3–0.7)
HEPARIN UNFRACTIONATED: 0.05 U/ML (ref 0.3–0.7)
HGB BLD-MCNC: 8.5 GM/DL (ref 14–18)
INR PPP: 2.83 (ref 0.85–1.13)
LIPASE SERPL-CCNC: 49.7 U/L (ref 5.6–51.3)
MCH RBC QN AUTO: 29.8 PG (ref 26–33)
MCHC RBC AUTO-ENTMCNC: 29.1 GM/DL (ref 32.2–35.5)
MCV RBC AUTO: 102.5 FL (ref 80–94)
PLATELET # BLD AUTO: 165 THOU/MM3 (ref 130–400)
PMV BLD AUTO: 10.4 FL (ref 9.4–12.4)
POTASSIUM SERPL-SCNC: 5.2 MEQ/L (ref 3.5–5.2)
PROT SERPL-MCNC: 6.7 G/DL (ref 6.1–8)
RBC # BLD AUTO: 2.85 MILL/MM3 (ref 4.7–6.1)
SODIUM SERPL-SCNC: 135 MEQ/L (ref 135–145)
WBC # BLD AUTO: 7.7 THOU/MM3 (ref 4.8–10.8)

## 2024-03-02 PROCEDURE — 85610 PROTHROMBIN TIME: CPT

## 2024-03-02 PROCEDURE — 6370000000 HC RX 637 (ALT 250 FOR IP): Performed by: INTERNAL MEDICINE

## 2024-03-02 PROCEDURE — 6370000000 HC RX 637 (ALT 250 FOR IP): Performed by: STUDENT IN AN ORGANIZED HEALTH CARE EDUCATION/TRAINING PROGRAM

## 2024-03-02 PROCEDURE — 2580000003 HC RX 258: Performed by: STUDENT IN AN ORGANIZED HEALTH CARE EDUCATION/TRAINING PROGRAM

## 2024-03-02 PROCEDURE — 1200000003 HC TELEMETRY R&B

## 2024-03-02 PROCEDURE — 6370000000 HC RX 637 (ALT 250 FOR IP): Performed by: NURSE PRACTITIONER

## 2024-03-02 PROCEDURE — 36415 COLL VENOUS BLD VENIPUNCTURE: CPT

## 2024-03-02 PROCEDURE — 93005 ELECTROCARDIOGRAM TRACING: CPT | Performed by: INTERNAL MEDICINE

## 2024-03-02 PROCEDURE — 99232 SBSQ HOSP IP/OBS MODERATE 35: CPT | Performed by: INTERNAL MEDICINE

## 2024-03-02 PROCEDURE — 6360000002 HC RX W HCPCS: Performed by: INTERNAL MEDICINE

## 2024-03-02 PROCEDURE — 82948 REAGENT STRIP/BLOOD GLUCOSE: CPT

## 2024-03-02 PROCEDURE — 82248 BILIRUBIN DIRECT: CPT

## 2024-03-02 PROCEDURE — 6360000002 HC RX W HCPCS: Performed by: STUDENT IN AN ORGANIZED HEALTH CARE EDUCATION/TRAINING PROGRAM

## 2024-03-02 PROCEDURE — 80053 COMPREHEN METABOLIC PANEL: CPT

## 2024-03-02 PROCEDURE — 2500000003 HC RX 250 WO HCPCS: Performed by: INTERNAL MEDICINE

## 2024-03-02 PROCEDURE — 83690 ASSAY OF LIPASE: CPT

## 2024-03-02 PROCEDURE — 2580000003 HC RX 258: Performed by: INTERNAL MEDICINE

## 2024-03-02 PROCEDURE — 85520 HEPARIN ASSAY: CPT

## 2024-03-02 PROCEDURE — C9113 INJ PANTOPRAZOLE SODIUM, VIA: HCPCS | Performed by: STUDENT IN AN ORGANIZED HEALTH CARE EDUCATION/TRAINING PROGRAM

## 2024-03-02 PROCEDURE — 99231 SBSQ HOSP IP/OBS SF/LOW 25: CPT | Performed by: NURSE PRACTITIONER

## 2024-03-02 PROCEDURE — 85027 COMPLETE CBC AUTOMATED: CPT

## 2024-03-02 RX ORDER — HYDRALAZINE HYDROCHLORIDE 50 MG/1
100 TABLET, FILM COATED ORAL 3 TIMES DAILY
Status: DISCONTINUED | OUTPATIENT
Start: 2024-03-02 | End: 2024-03-03 | Stop reason: HOSPADM

## 2024-03-02 RX ADMIN — HYDROMORPHONE HYDROCHLORIDE 0.5 MG: 1 INJECTION, SOLUTION INTRAMUSCULAR; INTRAVENOUS; SUBCUTANEOUS at 20:21

## 2024-03-02 RX ADMIN — DICYCLOMINE HYDROCHLORIDE 20 MG: 10 CAPSULE ORAL at 15:19

## 2024-03-02 RX ADMIN — SODIUM BICARBONATE: 84 INJECTION, SOLUTION INTRAVENOUS at 11:43

## 2024-03-02 RX ADMIN — PANTOPRAZOLE SODIUM 40 MG: 40 INJECTION, POWDER, FOR SOLUTION INTRAVENOUS at 08:06

## 2024-03-02 RX ADMIN — METRONIDAZOLE 500 MG: 500 INJECTION, SOLUTION INTRAVENOUS at 02:36

## 2024-03-02 RX ADMIN — MEXILETINE HYDROCHLORIDE 300 MG: 150 CAPSULE ORAL at 21:49

## 2024-03-02 RX ADMIN — INSULIN GLARGINE 5 UNITS: 100 INJECTION, SOLUTION SUBCUTANEOUS at 20:19

## 2024-03-02 RX ADMIN — DICYCLOMINE HYDROCHLORIDE 20 MG: 10 CAPSULE ORAL at 06:52

## 2024-03-02 RX ADMIN — NALOXEGOL OXALATE 12.5 MG: 12.5 TABLET, FILM COATED ORAL at 09:42

## 2024-03-02 RX ADMIN — MEXILETINE HYDROCHLORIDE 300 MG: 150 CAPSULE ORAL at 15:18

## 2024-03-02 RX ADMIN — SODIUM CHLORIDE, PRESERVATIVE FREE 10 ML: 5 INJECTION INTRAVENOUS at 08:07

## 2024-03-02 RX ADMIN — HYDROMORPHONE HYDROCHLORIDE 1 MG: 1 INJECTION, SOLUTION INTRAMUSCULAR; INTRAVENOUS; SUBCUTANEOUS at 01:06

## 2024-03-02 RX ADMIN — HYDRALAZINE HYDROCHLORIDE 100 MG: 50 TABLET ORAL at 20:15

## 2024-03-02 RX ADMIN — ATORVASTATIN CALCIUM 40 MG: 40 TABLET, FILM COATED ORAL at 20:15

## 2024-03-02 RX ADMIN — HYDRALAZINE HYDROCHLORIDE 50 MG: 50 TABLET ORAL at 08:06

## 2024-03-02 RX ADMIN — HYDROMORPHONE HYDROCHLORIDE 0.5 MG: 1 INJECTION, SOLUTION INTRAMUSCULAR; INTRAVENOUS; SUBCUTANEOUS at 11:41

## 2024-03-02 RX ADMIN — DICYCLOMINE HYDROCHLORIDE 20 MG: 10 CAPSULE ORAL at 20:19

## 2024-03-02 RX ADMIN — SODIUM CHLORIDE, PRESERVATIVE FREE 10 ML: 5 INJECTION INTRAVENOUS at 09:45

## 2024-03-02 RX ADMIN — METRONIDAZOLE 500 MG: 500 INJECTION, SOLUTION INTRAVENOUS at 09:44

## 2024-03-02 RX ADMIN — DICYCLOMINE HYDROCHLORIDE 20 MG: 10 CAPSULE ORAL at 09:42

## 2024-03-02 RX ADMIN — ISOSORBIDE MONONITRATE 120 MG: 60 TABLET, EXTENDED RELEASE ORAL at 08:06

## 2024-03-02 RX ADMIN — AMIODARONE HYDROCHLORIDE 200 MG: 200 TABLET ORAL at 08:06

## 2024-03-02 RX ADMIN — HYDROMORPHONE HYDROCHLORIDE 1 MG: 1 INJECTION, SOLUTION INTRAMUSCULAR; INTRAVENOUS; SUBCUTANEOUS at 04:36

## 2024-03-02 RX ADMIN — HYDROMORPHONE HYDROCHLORIDE 0.5 MG: 1 INJECTION, SOLUTION INTRAMUSCULAR; INTRAVENOUS; SUBCUTANEOUS at 15:50

## 2024-03-02 RX ADMIN — Medication 100 MG: at 08:06

## 2024-03-02 ASSESSMENT — PAIN DESCRIPTION - DESCRIPTORS
DESCRIPTORS: ACHING;DISCOMFORT
DESCRIPTORS: ACHING;DISCOMFORT
DESCRIPTORS: ACHING
DESCRIPTORS: ACHING
DESCRIPTORS: ACHING;DISCOMFORT
DESCRIPTORS: ACHING

## 2024-03-02 ASSESSMENT — PAIN DESCRIPTION - LOCATION
LOCATION: ABDOMEN;NECK;HEAD
LOCATION: GENERALIZED
LOCATION: ABDOMEN
LOCATION: ABDOMEN;NECK
LOCATION: ABDOMEN
LOCATION: ABDOMEN;NECK
LOCATION: ABDOMEN;NECK
LOCATION: ABDOMEN

## 2024-03-02 ASSESSMENT — PAIN SCALES - GENERAL
PAINLEVEL_OUTOF10: 8
PAINLEVEL_OUTOF10: 10
PAINLEVEL_OUTOF10: 8
PAINLEVEL_OUTOF10: 8
PAINLEVEL_OUTOF10: 9
PAINLEVEL_OUTOF10: 8

## 2024-03-02 ASSESSMENT — PAIN DESCRIPTION - ORIENTATION
ORIENTATION: MID
ORIENTATION: RIGHT;LEFT

## 2024-03-02 NOTE — PLAN OF CARE
Problem: Safety - Adult  Goal: Free from fall injury  Outcome: Progressing     Problem: ABCDS Injury Assessment  Goal: Absence of physical injury  Outcome: Progressing     Problem: Pain  Goal: Verbalizes/displays adequate comfort level or baseline comfort level  Outcome: Progressing     Problem: Chronic Conditions and Co-morbidities  Goal: Patient's chronic conditions and co-morbidity symptoms are monitored and maintained or improved  Outcome: Progressing

## 2024-03-03 VITALS
RESPIRATION RATE: 16 BRPM | HEART RATE: 58 BPM | BODY MASS INDEX: 35.08 KG/M2 | DIASTOLIC BLOOD PRESSURE: 83 MMHG | WEIGHT: 273.37 LBS | SYSTOLIC BLOOD PRESSURE: 156 MMHG | TEMPERATURE: 98.2 F | OXYGEN SATURATION: 100 % | HEIGHT: 74 IN

## 2024-03-03 LAB
ANION GAP SERPL CALC-SCNC: 13 MEQ/L (ref 8–16)
BUN SERPL-MCNC: 48 MG/DL (ref 7–22)
CALCIUM SERPL-MCNC: 8.5 MG/DL (ref 8.5–10.5)
CHLORIDE SERPL-SCNC: 109 MEQ/L (ref 98–111)
CO2 SERPL-SCNC: 17 MEQ/L (ref 23–33)
CREAT SERPL-MCNC: 3.6 MG/DL (ref 0.4–1.2)
EKG Q-T INTERVAL: 460 MS
EKG QRS DURATION: 92 MS
EKG QTC CALCULATION (BAZETT): 460 MS
EKG T AXIS: 90 DEGREES
EKG VENTRICULAR RATE: 60 BPM
GFR SERPL CREATININE-BSD FRML MDRD: 19 ML/MIN/1.73M2
GLUCOSE BLD STRIP.AUTO-MCNC: 96 MG/DL (ref 70–108)
GLUCOSE SERPL-MCNC: 90 MG/DL (ref 70–108)
INR PPP: 2.29 (ref 0.85–1.13)
POTASSIUM SERPL-SCNC: 4.4 MEQ/L (ref 3.5–5.2)
SODIUM SERPL-SCNC: 139 MEQ/L (ref 135–145)

## 2024-03-03 PROCEDURE — 6370000000 HC RX 637 (ALT 250 FOR IP): Performed by: INTERNAL MEDICINE

## 2024-03-03 PROCEDURE — 93010 ELECTROCARDIOGRAM REPORT: CPT | Performed by: INTERNAL MEDICINE

## 2024-03-03 PROCEDURE — C9113 INJ PANTOPRAZOLE SODIUM, VIA: HCPCS | Performed by: STUDENT IN AN ORGANIZED HEALTH CARE EDUCATION/TRAINING PROGRAM

## 2024-03-03 PROCEDURE — 83735 ASSAY OF MAGNESIUM: CPT

## 2024-03-03 PROCEDURE — 6370000000 HC RX 637 (ALT 250 FOR IP): Performed by: STUDENT IN AN ORGANIZED HEALTH CARE EDUCATION/TRAINING PROGRAM

## 2024-03-03 PROCEDURE — 99232 SBSQ HOSP IP/OBS MODERATE 35: CPT | Performed by: INTERNAL MEDICINE

## 2024-03-03 PROCEDURE — 6360000002 HC RX W HCPCS: Performed by: INTERNAL MEDICINE

## 2024-03-03 PROCEDURE — 6370000000 HC RX 637 (ALT 250 FOR IP): Performed by: NURSE PRACTITIONER

## 2024-03-03 PROCEDURE — 36415 COLL VENOUS BLD VENIPUNCTURE: CPT

## 2024-03-03 PROCEDURE — 85610 PROTHROMBIN TIME: CPT

## 2024-03-03 PROCEDURE — 6360000002 HC RX W HCPCS: Performed by: STUDENT IN AN ORGANIZED HEALTH CARE EDUCATION/TRAINING PROGRAM

## 2024-03-03 PROCEDURE — 2580000003 HC RX 258: Performed by: STUDENT IN AN ORGANIZED HEALTH CARE EDUCATION/TRAINING PROGRAM

## 2024-03-03 PROCEDURE — 82948 REAGENT STRIP/BLOOD GLUCOSE: CPT

## 2024-03-03 PROCEDURE — 93005 ELECTROCARDIOGRAM TRACING: CPT | Performed by: INTERNAL MEDICINE

## 2024-03-03 PROCEDURE — 99239 HOSP IP/OBS DSCHRG MGMT >30: CPT | Performed by: INTERNAL MEDICINE

## 2024-03-03 PROCEDURE — 80048 BASIC METABOLIC PNL TOTAL CA: CPT

## 2024-03-03 RX ORDER — HYDROCODONE BITARTRATE AND ACETAMINOPHEN 5; 325 MG/1; MG/1
1 TABLET ORAL EVERY 4 HOURS PRN
Qty: 10 TABLET | Refills: 0 | Status: SHIPPED | OUTPATIENT
Start: 2024-03-03 | End: 2024-03-06

## 2024-03-03 RX ORDER — HYDRALAZINE HYDROCHLORIDE 100 MG/1
100 TABLET, FILM COATED ORAL 3 TIMES DAILY
Qty: 180 TABLET | Refills: 0 | Status: ON HOLD | OUTPATIENT
Start: 2024-03-03 | End: 2024-05-02

## 2024-03-03 RX ORDER — DICYCLOMINE HYDROCHLORIDE 10 MG/1
10 CAPSULE ORAL
Qty: 120 CAPSULE | Refills: 0 | Status: ON HOLD | OUTPATIENT
Start: 2024-03-03

## 2024-03-03 RX ORDER — MEXILETINE HYDROCHLORIDE 150 MG/1
300 CAPSULE ORAL EVERY 8 HOURS SCHEDULED
Qty: 120 CAPSULE | Refills: 1 | Status: ON HOLD | OUTPATIENT
Start: 2024-03-03

## 2024-03-03 RX ORDER — LANOLIN ALCOHOL/MO/W.PET/CERES
100 CREAM (GRAM) TOPICAL DAILY
Qty: 30 TABLET | Refills: 0 | Status: ON HOLD | OUTPATIENT
Start: 2024-03-04

## 2024-03-03 RX ADMIN — PANTOPRAZOLE SODIUM 40 MG: 40 INJECTION, POWDER, FOR SOLUTION INTRAVENOUS at 08:02

## 2024-03-03 RX ADMIN — HYDROMORPHONE HYDROCHLORIDE 0.5 MG: 1 INJECTION, SOLUTION INTRAMUSCULAR; INTRAVENOUS; SUBCUTANEOUS at 08:47

## 2024-03-03 RX ADMIN — HYDROMORPHONE HYDROCHLORIDE 0.5 MG: 1 INJECTION, SOLUTION INTRAMUSCULAR; INTRAVENOUS; SUBCUTANEOUS at 00:23

## 2024-03-03 RX ADMIN — DICYCLOMINE HYDROCHLORIDE 20 MG: 10 CAPSULE ORAL at 06:12

## 2024-03-03 RX ADMIN — Medication 100 MG: at 07:58

## 2024-03-03 RX ADMIN — MEXILETINE HYDROCHLORIDE 300 MG: 150 CAPSULE ORAL at 06:12

## 2024-03-03 RX ADMIN — NALOXEGOL OXALATE 12.5 MG: 12.5 TABLET, FILM COATED ORAL at 06:12

## 2024-03-03 RX ADMIN — HYDROMORPHONE HYDROCHLORIDE 0.5 MG: 1 INJECTION, SOLUTION INTRAMUSCULAR; INTRAVENOUS; SUBCUTANEOUS at 04:33

## 2024-03-03 RX ADMIN — SODIUM CHLORIDE, PRESERVATIVE FREE 10 ML: 5 INJECTION INTRAVENOUS at 08:01

## 2024-03-03 RX ADMIN — DICYCLOMINE HYDROCHLORIDE 20 MG: 10 CAPSULE ORAL at 11:29

## 2024-03-03 RX ADMIN — HYDRALAZINE HYDROCHLORIDE 100 MG: 50 TABLET ORAL at 07:58

## 2024-03-03 RX ADMIN — SODIUM CHLORIDE, PRESERVATIVE FREE 10 ML: 5 INJECTION INTRAVENOUS at 07:59

## 2024-03-03 RX ADMIN — METOPROLOL SUCCINATE 100 MG: 100 TABLET, EXTENDED RELEASE ORAL at 07:58

## 2024-03-03 RX ADMIN — AMIODARONE HYDROCHLORIDE 200 MG: 200 TABLET ORAL at 07:59

## 2024-03-03 RX ADMIN — ISOSORBIDE MONONITRATE 120 MG: 60 TABLET, EXTENDED RELEASE ORAL at 07:58

## 2024-03-03 ASSESSMENT — PAIN DESCRIPTION - DESCRIPTORS
DESCRIPTORS: ACHING;DISCOMFORT
DESCRIPTORS: ACHING
DESCRIPTORS: ACHING
DESCRIPTORS: ACHING;DISCOMFORT

## 2024-03-03 ASSESSMENT — PAIN DESCRIPTION - LOCATION
LOCATION: ABDOMEN

## 2024-03-03 ASSESSMENT — PAIN SCALES - GENERAL
PAINLEVEL_OUTOF10: 8

## 2024-03-03 ASSESSMENT — PAIN DESCRIPTION - ORIENTATION
ORIENTATION: RIGHT;LEFT
ORIENTATION: RIGHT;LEFT

## 2024-03-03 NOTE — DISCHARGE SUMMARY
days. Intended supply: 3 days. Take lowest dose possible to manage pain Max Daily Amount: 6 tablets     mexiletine 150 MG capsule  Commonly known as: MEXITIL  Take 2 capsules by mouth every 8 hours     thiamine 100 MG tablet  Take 1 tablet by mouth daily  Start taking on: March 4, 2024            CHANGE how you take these medications      hydrALAZINE 100 MG tablet  Commonly known as: APRESOLINE  Take 1 tablet by mouth 3 times daily  What changed:   medication strength  how much to take            CONTINUE taking these medications      ALPRAZolam 0.5 MG tablet  Commonly known as: XANAX  TAKE 1 TABLET BY MOUTH AT BEDTIME FOR ANXIETY     amiodarone 200 MG tablet  Commonly known as: CORDARONE  Take 1 tablet by mouth daily     atorvastatin 40 MG tablet  Commonly known as: LIPITOR  Take 1 tablet by mouth nightly     bumetanide 2 MG tablet  Commonly known as: BUMEX  TAKE 1 TABLET BY MOUTH IN THE MORNING     dapagliflozin 5 MG tablet  Commonly known as: Farxiga  Take 1 tablet by mouth every morning     glucose 4 g chewable tablet  Take 4 tablets by mouth as needed for Low blood sugar     Lantus SoloStar 100 UNIT/ML injection pen  Generic drug: insulin glargine     linagliptin 5 MG tablet  Commonly known as: Tradjenta  TAKE 1 TABLET BY MOUTH DAILY     metoprolol succinate 100 MG extended release tablet  Commonly known as: Toprol XL  Take 1 tablet by mouth daily     multivitamin Tabs tablet  Take 1 tablet by mouth daily     ticagrelor 90 MG Tabs tablet  Commonly known as: Brilinta  Take 1 tablet by mouth 2 times daily     warfarin 5 MG tablet  Commonly known as: Coumadin  Take 1 tablet by mouth daily            STOP taking these medications      potassium chloride 20 MEQ extended release tablet  Commonly known as: KLOR-CON M            ASK your doctor about these medications      buPROPion 150 MG extended release tablet  Commonly known as: Wellbutrin XL  Take 1 tablet by mouth every morning     isosorbide mononitrate 120 MG

## 2024-03-03 NOTE — PROGRESS NOTES
Hospitalist Progress Note    Patient:  Garrett Duncan      Unit/Bed:6K-04/004-A    YOB: 1965    MRN: [id number]       Acct: [id number]     PCP: Leonel Adorno APRN - CNP    Date of Admission: 2/29/2024    Active Hospital Problems    Diagnosis Date Noted    Essential hypertension [I10] 11/03/2020     Priority: High    Hyperkalemia [E87.5] 03/01/2024    Calculus of GB and bile duct w acute cholecyst w/o obst [K80.62] 02/29/2024    Alcohol-induced chronic pancreatitis (HCC) [K86.0] 02/29/2024    Chronic alcoholic gastritis [K29.20] 02/29/2024    Normocytic anemia [D64.9] 02/29/2024    Eyelash ptosis of right eye [H02.401] 02/29/2024    History of ventricular tachycardia [Z86.79] 12/04/2019    Acute kidney injury superimposed on CKD (HCC) [N17.9, N18.9] 09/25/2018    HFrEF (heart failure with reduced ejection fraction) (HCC) [I50.20] 10/13/2015    Diabetes mellitus type 2, insulin dependent (HCC) [E11.9, Z79.4] 08/18/2015     Assessment/Plan:    # Abdominal Pain, unclear etiology, Severe Panic/Anxiety: Complains of diffuse abdominal pain. Lipase mildly chronically elevated. CT A/P showing normal pancreas, questionable gallbladder wall thickening and pericholecystic fat stranding is observed. RUQ U/S gallbladder wall thickening with gallstones and positive sonographic Juárez's sign reflecting acute cholecystitis. However General surgery not really convinced as labs and clinical exam are not consistent. Normal WBC count, LFT's WNL. HIDA scan done on 3/1 showing patent cystic ducts without evidence of acute cholecystitis. Patient is poor surgical candidate, recommend conservative treatment.    --3/2: Calculus cholecystitis on imaging. Labs and clinical exam are not consistent with this, HIDA Scan showing patent cystic ducts without evidence of acute cholecystitis. General surgery signed off. Appears to be more anxiety related, abdomen is soft. HD stable.       # BABAK on CKD: Baseline creatinine of 
    Hospitalist Progress Note    Patient:  Garrett Duncan      Unit/Bed:6K-04/004-A    YOB: 1965    MRN: [id number]       Acct: [id number]     PCP: Leonel Adorno APRN - CNP    Date of Admission: 2/29/2024    Active Hospital Problems    Diagnosis Date Noted    Essential hypertension [I10] 11/03/2020     Priority: High    Hyperkalemia [E87.5] 03/01/2024    Acute cholecystitis [K81.0] 02/29/2024    Alcohol-induced chronic pancreatitis (HCC) [K86.0] 02/29/2024    Chronic alcoholic gastritis [K29.20] 02/29/2024    Normocytic anemia [D64.9] 02/29/2024    Eyelash ptosis of right eye [H02.401] 02/29/2024    History of ventricular tachycardia [Z86.79] 12/04/2019    Acute kidney injury superimposed on CKD (HCC) [N17.9, N18.9] 09/25/2018    HFrEF (heart failure with reduced ejection fraction) (HCC) [I50.20] 10/13/2015    Diabetes mellitus type 2, insulin dependent (HCC) [E11.9, Z79.4] 08/18/2015     Assessment/Plan:    # Abdominal Pain, Acute Pancreatitis vs Biliary Colic? Complains of diffuse abdominal pain. CT A/P showing normal pancreas, questionable gallbladder wall thickening and pericholecystic fat stranding is observed. RUQ U/S gallbladder wall thickening with gallstones and positive sonographic Juárez's sign reflecting acute cholecystitis. However General surgery not really convinced as labs and clinical exam are not consistent. Normal WBC count, LFT's WNL. HIDA scan done on 3/1 showing patent cystic ducts without evidence of acute cholecystitis. Patient is poor surgical candidate, recommend conservative treatment with IV Abx, IVF, and if needed we can do percutaneous cholecystostomy.     # Alcohol abuse, at risk for withdrawal: EtOH level of 0.15% on admission. Patient reports drinking fifth of liquor on daily basis, he especially started drinking significantly more over the past week.  Last drink was this a.m (2/29). He wants to quit alcohol use.  On CIWA protocol with as needed Ativan.  He refused 
  [facility]  General Surgery Dr Faith Monge   covering for Dr Vaughn  Daily Progress Note  Pt Name: Garrett Duncan  Medical Record Number: [id number]  Date of Birth 1965   Today's Date: 3/2/2024  Chief complaint: when can I eat ?  ASSESSMENT:   Hospital day # 2   Calculus cholecystitis on imaging.  Labs and clinical exam are not consistent with this.  Complains mostly of left lower quadrant pain today  HIDA 3/1 - Patent cystic and common bile ducts without evidence of acute cholecystitis.   Normal white count  Normal liver function tests  4.  Alcohol abuse  5.  Chronic kidney disease  6.  Chronically elevated troponin  7.  CHF with cardiomyopathy.  BNP greater than 10,000 on admission  8.  Atrial fibrillation  9.  Anticoagulated state  10.  Hypertension  11.  Hyperkalemia  11.  has a past medical history of Acute systolic CHF (congestive heart failure) (HCC), Alcohol abuse, Anomalous origin of right coronary artery, CAD (coronary artery disease), Chronic kidney disease, Diabetes mellitus (HCC), Drug abuse (HCC), GERD (gastroesophageal reflux disease), History of implantable cardiac defibrillator (ICD), Hypertension, Intradural extramedullary spinal tumor, Long term (current) use of anticoagulants, Medtronic dual icd , Neuromuscular disorder (HCC), Paroxysmal atrial fibrillation (HCC), Severe obesity (BMI 35.0-39.9) with comorbidity (HCC), and V-tach (HCC).  RECOMMENDATIONS:   IV hydration  Analgesics and antiemetics as needed  Diet at this point per primary. HIDA scan reviewed - no evidence of acute cholecystitis  Rocephin   Remains anticoagulated INR 2.83  Very high risk for surgical intervention for perioperative complication  General Surgery Signing off, call if any new changes or concerns   SUBJECTIVE:   Garrett is hemodynamically stable. HTN this am.  Abdomen is soft no real tenderness to palpation in the right upper quadrant he mostly complains of left lower quadrant and mid abdominal pain. Has pain to 
  [facility] Surgery - Queta Vaughn MD  Daily Progress Note  Pt Name: Garrett Duncan  Medical Record Number: [id number]  Date of Birth 1965   Today's Date: 3/1/2024  Chief complaint: when can I eat ?  ASSESSMENT:   Hospital day # 1   Calculus cholecystitis on imaging.  Labs and clinical exam are not consistent with this.  Complains mostly of left lower quadrant pain today  Normal white count  Normal liver function tests  4.  Alcohol abuse  5.  Chronic kidney disease  6.  Chronically elevated troponin  7.  CHF with cardiomyopathy.  BNP greater than 10,000 on admission  8.  Atrial fibrillation  9.  Anticoagulated state  10.  Hypertension  11.  Hyperkalemia  11.  has a past medical history of Acute systolic CHF (congestive heart failure) (HCC), Alcohol abuse, Anomalous origin of right coronary artery, CAD (coronary artery disease), Chronic kidney disease, Diabetes mellitus (HCC), Drug abuse (HCC), GERD (gastroesophageal reflux disease), History of implantable cardiac defibrillator (ICD), Hypertension, Intradural extramedullary spinal tumor, Long term (current) use of anticoagulants, Medtronic dual icd , Neuromuscular disorder (HCC), Paroxysmal atrial fibrillation (HCC), Severe obesity (BMI 35.0-39.9) with comorbidity (HCC), and V-tach (HCC).  RECOMMENDATIONS:   IV hydration  Analgesics and antiemetics as needed  Diet at this point per primary. HIDA scan pending  Rocephin   Remains anticoagulated INR >3  Very high risk for surgical intervention for perioperative complication  SUBJECTIVE:   Garrett is hemodynamically stable.  More alert today.  He is asking when he can go home and when he can eat.  HIDA scan is pending.  I would not put much heriberto in an ejection fraction just trying to see if cystic duct is patent.  Abdomen is soft no real tenderness to palpation in the right upper quadrant he mostly complains of left lower quadrant and mid abdominal pain.  MEDICATIONS   Scheduled Meds:   pantoprazole  
1332 - Dr. Arteaga notified of patient HR dropping into the 30s while asleep and while awake patient is normally ranging in the 50s to 60s.     1333 - No new orders at this time.     1732 - EKG completed, showed Junctional Rhythm. Last EKG which was done on 2/29/2024 showed sinus rhythm with frequent PVCs. Notified Dr. Arteaga of EKG results.     1733 - No new orders at this time.   
Dr Arteaga [facility] notified about frequent non sustained v tach runs. Beats are more frequent but non- sustained. RN placed one time order for EKG for rhytm change. New EKG placed in chart and Dr notified. Orders to place lab for mag stat. Will continue to monitor. No other s/s noted. Vitals stable.  patient at bedside slightly anxious this morning asking if he can go home. Dr Arteaga updated on patient status. Awaiting lab draw and result . Will continue to monitor.   
Igor Cutler NP hospitalist notified for 10 beats V tach runs on patient tele monitor.   
[facility]   Nephrology progress note  3/1/2024, 11:42 AM      Pt Name:    Garrett Duncan  MRN:     [id number]     YOB: 1965  Admit Date:    2/29/2024  9:54 AM    Chief Complaint: Nephrology following for BABAK/CKD.    Subjective:  Patient seen and examined  No chest pain or shortness of breath  Feels okay abdominal pain somewhat better    Objective:  24HR INTAKE/OUTPUT:    Intake/Output Summary (Last 24 hours) at 3/1/2024 1142  Last data filed at 2/29/2024 2123  Gross per 24 hour   Intake 350 ml   Output --   Net 350 ml      Admission weight: 124 kg (273 lb 5.9 oz)  Wt Readings from Last 3 Encounters:   02/29/24 124 kg (273 lb 5.9 oz)   02/26/24 124.7 kg (275 lb)   02/20/24 124.5 kg (274 lb 6.4 oz)        Vitals :   Vitals:    02/29/24 2342 03/01/24 0257 03/01/24 0815 03/01/24 1045   BP: (!) 168/86 (!) 152/77 (!) 152/78 (!) 162/80   Pulse: 68 67 72 69   Resp: 18 18 18 22   Temp: 98.4 °F (36.9 °C) 98.6 °F (37 °C) 99.5 °F (37.5 °C) 98.4 °F (36.9 °C)   TempSrc: Oral Oral Oral Oral   SpO2: 96% 95% 94% 92%   Weight:       Height:           Physical examination  General Appearance:  Well developed. No distress  Mouth/Throat:  Oral mucosa moist  Neck:  Supple, no JVD  Lungs:  Breath sounds: clear  Heart::  S1,S2 heard  Abdomen:  Soft, non - tender  Musculoskeletal:  Edema -no significant edema    Medications:  Infusion:    sodium chloride      dextrose      [Held by provider] heparin (PORCINE) Infusion Stopped (02/29/24 1808)    sodium chloride       Meds:    dextrose  25 g IntraVENous Once    sodium zirconium cyclosilicate  10 g Oral Once    pantoprazole  40 mg IntraVENous Daily    amiodarone  200 mg Oral Daily    atorvastatin  40 mg Oral Nightly    [Held by provider] bumetanide  2 mg Oral Daily    hydrALAZINE  50 mg Oral TID    isosorbide mononitrate  120 mg Oral Daily    metoprolol succinate  100 mg Oral Daily    sodium chloride flush  5-40 mL IntraVENous 2 times per day    nicotine  1 
[facility]   Nephrology progress note  3/3/2024, 12:11 PM      Pt Name:    Garrett Duncan  MRN:     [id number]     YOB: 1965  Admit Date:    2/29/2024  9:54 AM    Chief Complaint: Nephrology following for BABAK/CKD.    Subjective:  Patient seen and examined  Hda nonsustained run of VT this AM.   He feels ok and wants to go home.  Leg edema noted.    Objective:  24HR INTAKE/OUTPUT:    Intake/Output Summary (Last 24 hours) at 3/3/2024 1211  Last data filed at 3/3/2024 0758  Gross per 24 hour   Intake 10 ml   Output 1600 ml   Net -1590 ml      Admission weight: 124 kg (273 lb 5.9 oz)  Wt Readings from Last 3 Encounters:   02/29/24 124 kg (273 lb 5.9 oz)   02/26/24 124.7 kg (275 lb)   02/20/24 124.5 kg (274 lb 6.4 oz)        Vitals :   Vitals:    03/02/24 2344 03/03/24 0428 03/03/24 0756 03/03/24 1127   BP: (!) 168/86 (!) 186/91 (!) 157/76 (!) 156/83   Pulse: 54 60 65 58   Resp: 16 16 16 16   Temp: 98.4 °F (36.9 °C) 99.1 °F (37.3 °C) 98.1 °F (36.7 °C) 98.2 °F (36.8 °C)   TempSrc: Oral Oral Oral Oral   SpO2: 98% 96% 97% 100%   Weight:       Height:           Physical examination  General Appearance:  Well developed. No distress  Mouth/Throat:  Oral mucosa moist  Neck:  Supple, no JVD  Lungs:  Breath sounds: clear  Heart::  S1,S2 heard  Abdomen:  Soft, non - tender  Musculoskeletal:  ++ edema    Medications:  Infusion:    sodium bicarbonate 75 mEq in sodium chloride 0.45 % 1,000 mL infusion Stopped (03/03/24 1022)    sodium chloride      dextrose      [Held by provider] heparin (PORCINE) Infusion Stopped (02/29/24 1808)    sodium chloride       Meds:    naloxegol  12.5 mg Oral QAM AC    hydrALAZINE  100 mg Oral TID    pantoprazole  40 mg IntraVENous Daily    amiodarone  200 mg Oral Daily    atorvastatin  40 mg Oral Nightly    [Held by provider] bumetanide  2 mg Oral Daily    isosorbide mononitrate  120 mg Oral Daily    metoprolol succinate  100 mg Oral Daily    sodium chloride flush  5-40 
Patient refusing bed alarm. Patient educated on the importance of the bed alarm to prevent falls.   
20 mg Oral 4x Daily AC & HS    insulin lispro  0-4 Units SubCUTAneous TID WC    insulin lispro  0-4 Units SubCUTAneous Nightly    insulin glargine  5 Units SubCUTAneous Nightly    sodium chloride flush  5-40 mL IntraVENous 2 times per day    thiamine  100 mg Oral Daily    mexiletine  300 mg Oral 3 times per day       Lab Data :  CBC:   Recent Labs     02/29/24  1015 02/29/24  1651 03/02/24  0440   WBC 7.9 9.6 7.7   HGB 8.9* 9.0* 8.5*   HCT 30.1* 29.9* 29.2*    181 165     CMP:  Recent Labs     02/29/24  1015 03/01/24  0437 03/01/24  0913 03/02/24  0440    136  --  135   K 4.7 5.8* 5.8* 5.2    107  --  106   CO2 17* 17*  --  14*   BUN 52* 54*  --  54*   CREATININE 3.8* 3.8*  --  3.8*   GLUCOSE 207* 175*  --  110*   CALCIUM 8.4* 8.7  --  8.6     Hepatic:   Recent Labs     02/29/24  1015 03/02/24  0440   LABALBU 3.8 3.7   AST 15 20   ALT 16 16   BILITOT 0.3 0.5   ALKPHOS 101 100       Assessment and Plan:  Renal -acute kidney injury most likely secondary to some prerenal etiology   No improvement since admission.     Starting to get swollen and more acidotic will change to bicarb drip reduce rate to 50 ml/hr. Recommended elevating his legs but might need to restart diuretics soon  Hyperkalemia: improved  Metabolic acidosis: see above  Abdominal pain, gallstones  Alcohol abuse  Essential hypertension, increase hydralazine  Diabetes mellitus  HFrEF ( 30%)  Meds reviewed and discussed with patient    Reta Gloria DO  [facility]    This report has been created using voice recognition software. It may contain minor errors which are inherent in voice recognition technology  
presence of pharyngeal phase deficits without formal instrumentation. Patient with good bolus control/manipulation and rotary mastication of ice chips. No oral residue noted. No overt signs/symptoms of aspiration across PO trials. Of course, pharyngeal dysfunction and totality of airway invasions cannot be discerned at bedside alone, however, instrumental evaluation not indicated at this time.     Recommend patient continue clear liquid diet per GI. Plans to collaborate with GI and advance patient diet as cleared by GI.    RECOMMENDATIONS/ASSESSMENT:  Instrumental Evaluation: Instrumental evaluation not indicated at this time.  Diet Recommendations:  Clear liquid diet - per GI  Strategies:  Full Upright Position, Limit Distractions, and Monitor for Fatigue   Rehabilitation Potential: good  Discharge Recommendations: Continue to Assess Pending Progress    EDUCATION:  Learner: Patient  Education:  Reviewed results and recommendations of this evaluation, Reviewed diet and strategies, Reviewed signs, symptoms and risks of aspiration, Reviewed ST goals and Plan of Care, and Reviewed recommendations for follow-up  Evaluation of Education: Verbalizes understanding    PLAN:  No further speech therapy services indicated. and Skilled SLP intervention on acute care 3-5 x per week or until goals met and/or pt plateaus in function.  Specific interventions for next session may include: dietary monitor.    SHORT TERM GOALS:   Short Term Goals  Time Frame for Short Term Goals: 2 weeks  Goal 1: Patient will safely consume a clear liquid diet and thin liquids with stable pulmonary status to meet hydration/nutrition needs.  Goal 2: ST to collaborate with GI to determine patient readiness for dietary advancement.    LONG TERM GOALS:  No LTGs established d/t expected short LOS.       Jillian Bowden MS, CF-SLP [id number]

## 2024-03-04 ENCOUNTER — TELEPHONE (OUTPATIENT)
Dept: FAMILY MEDICINE CLINIC | Age: 59
End: 2024-03-04

## 2024-03-04 ENCOUNTER — CARE COORDINATION (OUTPATIENT)
Dept: CASE MANAGEMENT | Age: 59
End: 2024-03-04

## 2024-03-04 LAB
EKG ATRIAL RATE: 63 BPM
EKG P AXIS: 71 DEGREES
EKG P-R INTERVAL: 220 MS
EKG QRS DURATION: 102 MS
EKG QTC CALCULATION (BAZETT): 470 MS
EKG VENTRICULAR RATE: 63 BPM

## 2024-03-04 PROCEDURE — 93010 ELECTROCARDIOGRAM REPORT: CPT | Performed by: NUCLEAR MEDICINE

## 2024-03-04 NOTE — TELEPHONE ENCOUNTER
Patient called and is scheduled for an appointment tomorrow. He was in the hospital 2/29/2024 until 3/03/2024.     He stated that cale is faxing over paperwork. He stated that he called in on Monday night because of the abdominal pain. He wanted to know if you would cover him from 2/26/24 until he returns to work.   He stated that he tried to take care of the abdominal pain at home until it got so bad he went into the hospital. He stated that he still isn't feeling up to go back to work. He plans to be off today and tomorrow for sure.   He would like to be covered off work 2/26/24 through 3/5/2024 at least.    Podiatry Interval: Pt is seen bedside in no acute distress with his girlfriend. Pt is in a covid exposed room however; has since tested negative. Denies pain to left foot and states he hasn't been walking. Pt inquires about wound vac and how it works as well as "what blood type" he is. Denies any overnight acute events. Denies further constitutional symptoms. Denies further pedal complaints.     Podiatry HPI: Pt is a 42M who presents to ED with his girlfriend with a chief complaint of worsening left foot wound. Pt states about 1.5-2 months prior, he was walking in work boots when he stepped on a metal screw. 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 Following this, pt arrived at [facility] where he was admitted for 7 days where they did a bedside I&D and given Unasyn and later discharged on IV picc line with more unasyn. Pt states he previously had a home health nurse changing his foot dressings every other day. Admits he noticed a blistering that worsened to bottom of left foot and that he had a lot of drainage to left 1st interspace with pinpoint wound. Pt states today, he experienced chills and burning pain that worsened when walking. Denies further constitutional symptoms. Denies recent acute trauma. Denies further pedal complaints.     Patient is a 42y old  Male who presents with a chief complaint of Left foot wound r/o Osteomyelitis (21 Sep 2022 18:46)      HPI:  Patient is a 41 yo male with hx of DM (last A1c >10), HTN, HLD presents with a left foot wound. The left foot wound initially started 6 weeks ago when the patient had a screw puncture his sole of his slipper and then suffered a wound on the bottom of the left foot. Patient stated he then when to [facility] and was admitted for 7 days in which he received Unasyn and then was later discharged with PICC line for additional 2-3 weeks of IV unasyn. Patient had wound care nurse come to his home, however started to develop blistering of the left foot and enlargement of the wound on the sole of his foot. Wounds also developed between the left great toe and 2nd toe. Patient reports purulent discharge and pain. Pain is in the left foot, 10/10 burning, constant and worsened with ambulation. Patient reports fevers and chills. Patient also states he has numbness in the 2nd toe on the left side and tingling. Patient denies chest pain, sob or palpitations. Denies syncope, dizziness.     Patient admits to  (-) Fevers, (-) Chills, (-) Nausea, (-) Vomiting, (-) Shortness of Breath (-) calf pain (-) chest pain     Medications acetaminophen     Tablet .. 650 milliGRAM(s) Oral every 6 hours PRN  atorvastatin 40 milliGRAM(s) Oral at bedtime  dextrose 5%. 1000 milliLiter(s) IV Continuous <Continuous>  dextrose 5%. 1000 milliLiter(s) IV Continuous <Continuous>  dextrose 50% Injectable 25 Gram(s) IV Push once  dextrose 50% Injectable 12.5 Gram(s) IV Push once  dextrose 50% Injectable 25 Gram(s) IV Push once  dextrose Oral Gel 15 Gram(s) Oral once PRN  glucagon  Injectable 1 milliGRAM(s) IntraMuscular once  influenza   Vaccine 0.5 milliLiter(s) IntraMuscular once  insulin glargine Injectable (LANTUS) 42 Unit(s) SubCutaneous every morning  insulin lispro (ADMELOG) corrective regimen sliding scale   SubCutaneous three times a day before meals  insulin lispro (ADMELOG) corrective regimen sliding scale   SubCutaneous at bedtime  insulin lispro Injectable (ADMELOG) 12 Unit(s) SubCutaneous three times a day before meals  oxyCODONE    IR 5 milliGRAM(s) Oral every 8 hours PRN  piperacillin/tazobactam IVPB.. 3.375 Gram(s) IV Intermittent every 8 hours  senna 2 Tablet(s) Oral at bedtime    FHDiabetes mellitus, type 2 (Mother)    ,   PMHSeasonal allergies    Obesity (BMI 30.0-34.9)    Hydrocele, bilateral    HLD (hyperlipidemia)    Hypertension    Diabetes mellitus, type 2    PVD (peripheral vascular disease)    GERD (gastroesophageal reflux disease)    Vitamin D deficiency       PSHNo significant past surgical history        Labs                          12.0   7.56  )-----------( 336      ( 27 Sep 2022 07:59 )             37.9      09-27    136  |  102  |  15  ----------------------------<  262<H>  4.0   |  26  |  0.87    Ca    9.4      27 Sep 2022 07:59       Vital Signs Last 24 Hrs  T(C): 36.7 (27 Sep 2022 13:38), Max: 37.1 (27 Sep 2022 05:06)  T(F): 98.1 (27 Sep 2022 13:38), Max: 98.7 (27 Sep 2022 05:06)  HR: 97 (27 Sep 2022 13:38) (80 - 98)  BP: 140/98 (27 Sep 2022 13:38) (140/98 - 160/102)  BP(mean): 109 (26 Sep 2022 21:09) (109 - 116)  RR: 16 (27 Sep 2022 13:38) (12 - 19)  SpO2: 98% (27 Sep 2022 13:38) (96% - 100%)    Parameters below as of 27 Sep 2022 13:38  Patient On (Oxygen Delivery Method): room air      Sedimentation Rate, Erythrocyte: 86 mm/Hr (09-27-22 @ 07:59)  Sedimentation Rate, Erythrocyte: 96 mm/Hr (09-21-22 @ 14:20)         C-Reactive Protein, Serum: 13 mg/L (09-27-22 @ 07:59)  C-Reactive Protein, Serum: 88 mg/L (09-21-22 @ 14:20)   WBC Count: 7.56 K/uL (09-27-22 @ 07:59)      PHYSICAL EXAM  LE Focused:  Pt is A&Ox3 in no acute distress  Left foot focused  Vasc: DP/PT pulses faintly palpable ; CFT < 3 seconds to hallux on left; improved erythema and improved swelling to the left foot. TG: warm to cool. Pedal hair absent. Varicosities absent.   Derm: Left 1st interspace maceration noted with small wound that tunnels to plantar wound measuring .2 x .2cm. Left plantar foot wound sub-2nd and 3rd metatarsal heads extending plantar with hyperkeratotic edges and fibrotic wound bed. No purulent drainage noted today; scant amount of serous drainage from plantar wound with healthy bleeding noted.  Neuro: Protective sensation absent b/l; light touch sensation diminished b/l   MSK: No POP to left foot wounds       < from: Xray Foot AP + Lateral + Oblique, Left (09.21.22 @ 15:35) >  ACC: [id number] EXAM:  XR FOOT COMP MIN 3 VIEWS LT                          PROCEDURE DATE:  09/21/2022          INTERPRETATION:  LEFT foot    CLINICAL INFORMATION: Infection..    TECHNIQUE: AP,lateral and oblique views.    FINDINGS:  A plantar ulceration with soft tissue swelling of the ball of foot.    The bones and joint spaces are intact.  No fracture or dislocation.  No radiographic evidence of osteomyelitis.    IMPRESSION:    Plantar soft tissue ulceration with soft tissue swelling.  No radiographic evidence of osteomyelitis.  If osteomyelitis is considered  despite conservative therapy, and soft   tissue / bone infection requires further assessment, follow-up MRI   recommended.    --- End of Report ---    < end of copied text >        CULTURES:    Podiatry Interval: Pt is seen bedside in no acute distress with his girlfriend. Pt is in a covid exposed room however; has since tested negative. Denies pain to left foot and states he hasn't been walking. Pt inquires about wound vac and how it works as well as "what blood type" he is. Denies any overnight acute events. Denies further constitutional symptoms. Denies further pedal complaints.     Podiatry HPI: Pt is a 42M who presents to ED with his girlfriend with a chief complaint of worsening left foot wound. Pt states about 1.5-2 months prior, he was walking in work boots when he stepped on a metal screw. 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 Following this, pt arrived at [facility] where he was admitted for 7 days where they did a bedside I&D and given Unasyn and later discharged on IV picc line with more unasyn. Pt states he previously had a home health nurse changing his foot dressings every other day. Admits he noticed a blistering that worsened to bottom of left foot and that he had a lot of drainage to left 1st interspace with pinpoint wound. Pt states today, he experienced chills and burning pain that worsened when walking. Denies further constitutional symptoms. Denies recent acute trauma. Denies further pedal complaints.     Patient is a 42y old  Male who presents with a chief complaint of Left foot wound r/o Osteomyelitis (21 Sep 2022 18:46)      HPI:  Patient is a 41 yo male with hx of DM (last A1c >10), HTN, HLD presents with a left foot wound. The left foot wound initially started 6 weeks ago when the patient had a screw puncture his sole of his slipper and then suffered a wound on the bottom of the left foot. Patient stated he then when to [facility] and was admitted for 7 days in which he received Unasyn and then was later discharged with PICC line for additional 2-3 weeks of IV unasyn. Patient had wound care nurse come to his home, however started to develop blistering of the left foot and enlargement of the wound on the sole of his foot. Wounds also developed between the left great toe and 2nd toe. Patient reports purulent discharge and pain. Pain is in the left foot, 10/10 burning, constant and worsened with ambulation. Patient reports fevers and chills. Patient also states he has numbness in the 2nd toe on the left side and tingling. Patient denies chest pain, sob or palpitations. Denies syncope, dizziness.     Patient admits to  (-) Fevers, (-) Chills, (-) Nausea, (-) Vomiting, (-) Shortness of Breath (-) calf pain (-) chest pain     Medications acetaminophen     Tablet .. 650 milliGRAM(s) Oral every 6 hours PRN  atorvastatin 40 milliGRAM(s) Oral at bedtime  dextrose 5%. 1000 milliLiter(s) IV Continuous <Continuous>  dextrose 5%. 1000 milliLiter(s) IV Continuous <Continuous>  dextrose 50% Injectable 25 Gram(s) IV Push once  dextrose 50% Injectable 12.5 Gram(s) IV Push once  dextrose 50% Injectable 25 Gram(s) IV Push once  dextrose Oral Gel 15 Gram(s) Oral once PRN  glucagon  Injectable 1 milliGRAM(s) IntraMuscular once  influenza   Vaccine 0.5 milliLiter(s) IntraMuscular once  insulin glargine Injectable (LANTUS) 42 Unit(s) SubCutaneous every morning  insulin lispro (ADMELOG) corrective regimen sliding scale   SubCutaneous three times a day before meals  insulin lispro (ADMELOG) corrective regimen sliding scale   SubCutaneous at bedtime  insulin lispro Injectable (ADMELOG) 12 Unit(s) SubCutaneous three times a day before meals  oxyCODONE    IR 5 milliGRAM(s) Oral every 8 hours PRN  piperacillin/tazobactam IVPB.. 3.375 Gram(s) IV Intermittent every 8 hours  senna 2 Tablet(s) Oral at bedtime    FHDiabetes mellitus, type 2 (Mother)    ,   PMHSeasonal allergies    Obesity (BMI 30.0-34.9)    Hydrocele, bilateral    HLD (hyperlipidemia)    Hypertension    Diabetes mellitus, type 2    PVD (peripheral vascular disease)    GERD (gastroesophageal reflux disease)    Vitamin D deficiency       PSHNo significant past surgical history        Labs                          12.0   7.56  )-----------( 336      ( 27 Sep 2022 07:59 )             37.9      09-27    136  |  102  |  15  ----------------------------<  262<H>  4.0   |  26  |  0.87    Ca    9.4      27 Sep 2022 07:59       Vital Signs Last 24 Hrs  T(C): 36.7 (27 Sep 2022 13:38), Max: 37.1 (27 Sep 2022 05:06)  T(F): 98.1 (27 Sep 2022 13:38), Max: 98.7 (27 Sep 2022 05:06)  HR: 97 (27 Sep 2022 13:38) (80 - 98)  BP: 140/98 (27 Sep 2022 13:38) (140/98 - 160/102)  BP(mean): 109 (26 Sep 2022 21:09) (109 - 116)  RR: 16 (27 Sep 2022 13:38) (12 - 19)  SpO2: 98% (27 Sep 2022 13:38) (96% - 100%)    Parameters below as of 27 Sep 2022 13:38  Patient On (Oxygen Delivery Method): room air      Sedimentation Rate, Erythrocyte: 86 mm/Hr (09-27-22 @ 07:59)  Sedimentation Rate, Erythrocyte: 96 mm/Hr (09-21-22 @ 14:20)         C-Reactive Protein, Serum: 13 mg/L (09-27-22 @ 07:59)  C-Reactive Protein, Serum: 88 mg/L (09-21-22 @ 14:20)   WBC Count: 7.56 K/uL (09-27-22 @ 07:59)      PHYSICAL EXAM  LE Focused:  Pt is A&Ox3 in no acute distress  Left foot focused  Vasc: DP/PT pulses palpable ; CFT < 3 seconds to hallux on left; improved erythema and improved swelling to the left foot. TG: warm to cool. Pedal hair absent. Varicosities absent.   Derm: Left 1st interspace maceration noted with small wound that tunnels to plantar wound measuring .2 x .2cm. Left plantar foot wound sub-2nd and 3rd metatarsal heads extending plantar with hyperkeratotic edges and granular/ adipose wound bed. No purulent drainage noted today; scant amount of serous drainage from plantar wound with healthy bleeding noted.  Neuro: Protective sensation absent b/l; light touch sensation diminished b/l   MSK: No POP to left foot wounds       < from: Xray Foot AP + Lateral + Oblique, Left (09.21.22 @ 15:35) >  ACC: [id number] EXAM:  XR FOOT COMP MIN 3 VIEWS LT                          PROCEDURE DATE:  09/21/2022          INTERPRETATION:  LEFT foot    CLINICAL INFORMATION: Infection..    TECHNIQUE: AP,lateral and oblique views.    FINDINGS:  A plantar ulceration with soft tissue swelling of the ball of foot.    The bones and joint spaces are intact.  No fracture or dislocation.  No radiographic evidence of osteomyelitis.    IMPRESSION:    Plantar soft tissue ulceration with soft tissue swelling.  No radiographic evidence of osteomyelitis.  If osteomyelitis is considered  despite conservative therapy, and soft   tissue / bone infection requires further assessment, follow-up MRI   recommended.    --- End of Report ---    < end of copied text >        CULTURES:

## 2024-03-04 NOTE — TELEPHONE ENCOUNTER
Care Transitions Initial Follow Up Call    Outreach made within 2 business days of discharge: Yes    Patient: Garrett Duncan Patient : 1965   MRN: [id number]  Reason for Admission: There are no discharge diagnoses documented for the most recent discharge.  Discharge Date: 3/3/24       Spoke with: Patient    Discharge department/facility: [facility]    TCM Interactive Patient Contact:  Was patient able to fill all prescriptions: Yes  Was patient instructed to bring all medications to the follow-up visit: Yes  Is patient taking all medications as directed in the discharge summary? Yes  Does patient understand their discharge instructions: Yes  Does patient have questions or concerns that need addressed prior to 7-14 day follow up office visit: no    Scheduled appointment with PCP within 7-14 days    Follow Up  Future Appointments   Date Time Provider Department Center   3/4/2024 11:30 AM Leonel Adorno APRN - CNP [facility] [place]   2024  3:30 PM [facility]   2024  3:30 PM Leonel Adorno APRN - CNP [facility] [place]   2024  3:30 PM Shelly Johnson MD [facility] [place]   2025  1:45 PM Fany Guzmán MD [facility] [place]       Queta Steven CMA ([facility])

## 2024-03-04 NOTE — CARE COORDINATION
Care Transitions Initial Follow Up Call    Call within 2 business days of discharge: Yes    Patient Current Location:  Home:  [facility] [address]    Care Transition Nurse contacted the patient by telephone to perform post hospital discharge assessment. Verified name and  with patient as identifiers. Provided introduction to self, and explanation of the Care Transition Nurse role.     Patient: Garrett Duncan Patient : 1965   MRN: [id number]  Reason for Admission: Abdominal pain, Alcohol abuse  Discharge Date: 3/3/24 RARS: Readmission Risk Score: 29.5      Last Discharge Facility       Date Complaint Diagnosis Description Type Department Provider    24 Abdominal Pain; Alcohol Problem Calculus of GB and bile duct w acute cholecyst w/o obst ... ED to Hosp-Admission (Discharged) (ADMITTED) URSULA NovaK Vandana Arteaga MD; Stefan, ...            Was this an external facility discharge? No Discharge Facility: [facility]    Challenges to be reviewed by the provider   Additional needs identified to be addressed with provider: No  none               Method of communication with provider: none.    Contacted pt for initial care transition follow up.  Garrett states he is feeling a lot better.  Abdominal pain comes and goes.  Bloating still present but states it is starting to get better.  Denies having swelling in other extremities.  Eating and drinking his fluids w/o issues.  He informed CTN that he has stopped drinking alcohol.  Last drink was prior to going to the hospital.  Discussed alcoholic treatment centers and he states he is aware of which centers he could go to and does not need any information.  Pt denies having any alcohol withdrawal symptoms such as shakiness, tremors, seizure like activity.  Denies having any urinary or bowel issues currently.  Last BM was yesterday.  Pt has a follow up with PCP tomorrow, 3/5/24.  Discussed importance of following up with nephrology in 2 weeks.  He states

## 2024-03-05 ENCOUNTER — PROCEDURE VISIT (OUTPATIENT)
Dept: CARDIOLOGY CLINIC | Age: 59
End: 2024-03-05
Payer: COMMERCIAL

## 2024-03-05 DIAGNOSIS — I50.20 SYSTOLIC CONGESTIVE HEART FAILURE, NYHA CLASS 2, UNSPECIFIED CONGESTIVE HEART FAILURE CHRONICITY (HCC): Primary | ICD-10-CM

## 2024-03-05 PROCEDURE — 93297 REM INTERROG DEV EVAL ICPMS: CPT | Performed by: NUCLEAR MEDICINE

## 2024-03-05 NOTE — PROGRESS NOTES
[employer] Dual ICD Optivol  Pt of [place]    Battery 10 months     Optivol elevated--    Spoke to patient. Was just discharged from the hospital yesterday. Admitted with abdominal pain.

## 2024-03-06 NOTE — ADT AUTH CERT
Patient Demographics    Name Patient ID SSN Gender Identity Birth Date   Alessio Sarabia [id number]  Male 65 (58 yrs)     Address Phone Email Employer     [place] [id number] [phone] (M)   [phone] (H) -- [employer]   [address]   [place] [address] [phone]      County Race Occupation Emp Status    [employer] (non-) -- Full Time      Reg Status PCP Date Last Verified Next Review Date    Verified Leonel Adorno, APRN - CNP  [phone] 24      Admission Date Discharge Date Admitting Provider     24 Behl, Ashita, MD       Marital Status Evangelical       Protestant        Emergency Contact 1 Emergency Contact 2   Jesica (Poa) Perla ()   [facility]   [phone] (H)   [phone] (M) Lou Sarabia ()   [facility]   [phone] (H)     Subscriber Details  Hospital Account #[id number]  CVG Subscriber Name/Sex/Relation Subscriber  Subscriber Address/Phone Subscriber Emp/Emp Phone   1. [facility]   [id number] ALESSIO SARABIA - Male   (Self) 1965 [place] [id number] [phone](H) [employer]   [phone]     Utilization Reviews       3/1   Last Updated by Haylee Leigh RN on 3/1/2024 1629     Review Status Created By   In Primary Haylee Leigh RN       Review Type Associated Date   -- 3/1/2024      Criteria Review   DATE: 3/1         RELEVANT BASELINES: (lab values, vitals, o2 amount/delivery, etc.)  RA. IPTA  reports drinking fifth of liquor on daily basis   Baseline creatinine of 2.8-3.1         PERTINENT UPDATES:  IM, GS and Nephrology following  IVF. IV rocephin, IV flagyl, IV Protonix   CIWA with ativan  Pain control.   HIDA scan pending.         VITALS: 99.5 (37.5)    18        72        152/78  94% RA         ABNL/PERTINENT LABS/RADIOLOGY/DIAGNOSTIC STUDIES:  24 04:37  Potassium: 5.8 (H)  CO2: 17 (L)  BUN,BUNPL: 54 (H)  Creatinine: 3.8 (HH)  Est, Glom Filt Rate: 17

## 2024-03-07 ENCOUNTER — APPOINTMENT (OUTPATIENT)
Dept: GENERAL RADIOLOGY | Age: 59
DRG: 291 | End: 2024-03-07
Payer: COMMERCIAL

## 2024-03-07 ENCOUNTER — CARE COORDINATION (OUTPATIENT)
Dept: CASE MANAGEMENT | Age: 59
End: 2024-03-07

## 2024-03-07 ENCOUNTER — HOSPITAL ENCOUNTER (INPATIENT)
Age: 59
LOS: 3 days | Discharge: HOME | DRG: 291 | End: 2024-03-11
Attending: EMERGENCY MEDICINE | Admitting: STUDENT IN AN ORGANIZED HEALTH CARE EDUCATION/TRAINING PROGRAM
Payer: COMMERCIAL

## 2024-03-07 ENCOUNTER — OFFICE VISIT (OUTPATIENT)
Dept: FAMILY MEDICINE CLINIC | Age: 59
End: 2024-03-07

## 2024-03-07 ENCOUNTER — TELEPHONE (OUTPATIENT)
Dept: FAMILY MEDICINE CLINIC | Age: 59
End: 2024-03-07

## 2024-03-07 VITALS
SYSTOLIC BLOOD PRESSURE: 160 MMHG | HEART RATE: 60 BPM | OXYGEN SATURATION: 96 % | RESPIRATION RATE: 16 BRPM | DIASTOLIC BLOOD PRESSURE: 80 MMHG | WEIGHT: 276.5 LBS | BODY MASS INDEX: 35.5 KG/M2

## 2024-03-07 DIAGNOSIS — R60.9 BODY FLUID RETENTION: ICD-10-CM

## 2024-03-07 DIAGNOSIS — E78.2 MIXED HYPERLIPIDEMIA: ICD-10-CM

## 2024-03-07 DIAGNOSIS — I50.9 CONGESTIVE HEART FAILURE, UNSPECIFIED HF CHRONICITY, UNSPECIFIED HEART FAILURE TYPE (HCC): Primary | ICD-10-CM

## 2024-03-07 DIAGNOSIS — R06.00 DYSPNEA, UNSPECIFIED TYPE: ICD-10-CM

## 2024-03-07 DIAGNOSIS — R07.9 CHEST PAIN IN ADULT: ICD-10-CM

## 2024-03-07 DIAGNOSIS — F10.231 ALCOHOL DEPENDENCE WITH WITHDRAWAL DELIRIUM (HCC): ICD-10-CM

## 2024-03-07 DIAGNOSIS — N18.4 CKD (CHRONIC KIDNEY DISEASE) STAGE 4, GFR 15-29 ML/MIN (HCC): ICD-10-CM

## 2024-03-07 DIAGNOSIS — Z95.810 ICD (IMPLANTABLE CARDIOVERTER-DEFIBRILLATOR) IN PLACE: ICD-10-CM

## 2024-03-07 DIAGNOSIS — I42.0 DILATED CARDIOMYOPATHY (HCC): ICD-10-CM

## 2024-03-07 DIAGNOSIS — Z09 HOSPITAL DISCHARGE FOLLOW-UP: Primary | ICD-10-CM

## 2024-03-07 DIAGNOSIS — I47.20 V-TACH (HCC): ICD-10-CM

## 2024-03-07 DIAGNOSIS — N18.32 STAGE 3B CHRONIC KIDNEY DISEASE (CKD) (HCC): ICD-10-CM

## 2024-03-07 DIAGNOSIS — I10 ESSENTIAL HYPERTENSION: ICD-10-CM

## 2024-03-07 DIAGNOSIS — I50.22 CHRONIC SYSTOLIC CONGESTIVE HEART FAILURE (HCC): ICD-10-CM

## 2024-03-07 DIAGNOSIS — J81.1 CHRONIC PULMONARY EDEMA: ICD-10-CM

## 2024-03-07 DIAGNOSIS — F41.3 OTHER MIXED ANXIETY DISORDERS: ICD-10-CM

## 2024-03-07 DIAGNOSIS — I25.709 CORONARY ARTERY DISEASE INVOLVING CORONARY BYPASS GRAFT OF NATIVE HEART WITH ANGINA PECTORIS (HCC): ICD-10-CM

## 2024-03-07 LAB
ALBUMIN SERPL BCG-MCNC: 3.7 G/DL (ref 3.5–5.1)
ALP SERPL-CCNC: 121 U/L (ref 38–126)
ALT SERPL W/O P-5'-P-CCNC: 15 U/L (ref 11–66)
AMMONIA PLAS-MCNC: 14 UMOL/L (ref 11–60)
ANION GAP SERPL CALC-SCNC: 14 MEQ/L (ref 8–16)
AST SERPL-CCNC: 18 U/L (ref 5–40)
BASE EXCESS BLDA CALC-SCNC: -5.8 MMOL/L (ref -2.5–2.5)
BASE EXCESS BLDA CALC-SCNC: -6.1 MMOL/L (ref -2–3)
BASOPHILS ABSOLUTE: 0.1 THOU/MM3 (ref 0–0.1)
BASOPHILS NFR BLD AUTO: 0.6 %
BDY SITE: ABNORMAL
BILIRUB SERPL-MCNC: 0.4 MG/DL (ref 0.3–1.2)
BUN SERPL-MCNC: 31 MG/DL (ref 7–22)
CALCIUM SERPL-MCNC: 8.9 MG/DL (ref 8.5–10.5)
CHLORIDE SERPL-SCNC: 107 MEQ/L (ref 98–111)
CO2 SERPL-SCNC: 17 MEQ/L (ref 23–33)
COLLECTED BY:: ABNORMAL
COLLECTED BY:: ABNORMAL
CREAT SERPL-MCNC: 3 MG/DL (ref 0.4–1.2)
D DIMER PPP IA.FEU-MCNC: 1600 NG/ML FEU (ref 0–500)
DEPRECATED RDW RBC AUTO: 61.8 FL (ref 35–45)
DEVICE: ABNORMAL
EKG ATRIAL RATE: 64 BPM
EKG P AXIS: 70 DEGREES
EKG P-R INTERVAL: 184 MS
EKG Q-T INTERVAL: 440 MS
EKG QRS DURATION: 88 MS
EKG QTC CALCULATION (BAZETT): 453 MS
EKG R AXIS: -9 DEGREES
EKG T AXIS: 89 DEGREES
EKG VENTRICULAR RATE: 64 BPM
EOSINOPHIL NFR BLD AUTO: 1.7 %
EOSINOPHILS ABSOLUTE: 0.1 THOU/MM3 (ref 0–0.4)
ERYTHROCYTE [DISTWIDTH] IN BLOOD BY AUTOMATED COUNT: 17.9 % (ref 11.5–14.5)
ETHANOL SERPL-MCNC: < 0.01 %
FIO2 ON VENT O2 ANALYZER: 36 %
FLUAV RNA RESP QL NAA+PROBE: NOT DETECTED
FLUBV RNA RESP QL NAA+PROBE: NOT DETECTED
GFR SERPL CREATININE-BSD FRML MDRD: 23 ML/MIN/1.73M2
GLUCOSE BLD STRIP.AUTO-MCNC: 176 MG/DL (ref 70–108)
GLUCOSE SERPL-MCNC: 166 MG/DL (ref 70–108)
HCO3 BLDA-SCNC: 18 MMOL/L (ref 23–28)
HCO3 BLDA-SCNC: 21 MMOL/L (ref 23–28)
HCT VFR BLD AUTO: 33.9 % (ref 42–52)
HGB BLD-MCNC: 10.2 GM/DL (ref 14–18)
IMM GRANULOCYTES # BLD AUTO: 0.05 THOU/MM3 (ref 0–0.07)
IMM GRANULOCYTES NFR BLD AUTO: 0.6 %
LACTATE SERPL-SCNC: 0.7 MMOL/L (ref 0.5–2)
LIPASE SERPL-CCNC: 59.5 U/L (ref 5.6–51.3)
LYMPHOCYTES ABSOLUTE: 0.5 THOU/MM3 (ref 1–4.8)
LYMPHOCYTES NFR BLD AUTO: 5.3 %
MCH RBC QN AUTO: 29.6 PG (ref 26–33)
MCHC RBC AUTO-ENTMCNC: 30.1 GM/DL (ref 32.2–35.5)
MCV RBC AUTO: 98.3 FL (ref 80–94)
MONOCYTES ABSOLUTE: 0.7 THOU/MM3 (ref 0.4–1.3)
MONOCYTES NFR BLD AUTO: 8.1 %
NEUTROPHILS NFR BLD AUTO: 83.7 %
NRBC BLD AUTO-RTO: 0 /100 WBC
NT-PROBNP SERPL IA-MCNC: ABNORMAL PG/ML (ref 0–124)
OSMOLALITY SERPL CALC.SUM OF ELEC: 286 MOSMOL/KG (ref 275–300)
PCO2 BLDA: 48 MMHG (ref 35–45)
PCO2 TEMP ADJ BLDMV: 29 MMHG (ref 41–51)
PH BLDA: 7.26 [PH] (ref 7.35–7.45)
PH BLDMV: 7.4 [PH] (ref 7.31–7.41)
PLATELET # BLD AUTO: 174 THOU/MM3 (ref 130–400)
PMV BLD AUTO: 10.5 FL (ref 9.4–12.4)
PO2 BLDA: 85 MMHG (ref 71–104)
PO2 BLDMV: 38 MMHG (ref 25–40)
POTASSIUM SERPL-SCNC: 3.9 MEQ/L (ref 3.5–5.2)
PROCALCITONIN SERPL IA-MCNC: 0.26 NG/ML (ref 0.01–0.09)
PROT SERPL-MCNC: 7 G/DL (ref 6.1–8)
RBC # BLD AUTO: 3.45 MILL/MM3 (ref 4.7–6.1)
SAO2 % BLDA: 95 %
SAO2 % BLDMV: 74 %
SARS-COV-2 RNA RESP QL NAA+PROBE: NOT DETECTED
SEGMENTED NEUTROPHILS ABSOLUTE COUNT: 7.2 THOU/MM3 (ref 1.8–7.7)
SODIUM SERPL-SCNC: 138 MEQ/L (ref 135–145)
T4 FREE SERPL-MCNC: 1.33 NG/DL (ref 0.93–1.68)
TROPONIN, HIGH SENSITIVITY: 115 NG/L (ref 0–12)
TROPONIN, HIGH SENSITIVITY: 118 NG/L (ref 0–12)
TSH SERPL DL<=0.005 MIU/L-ACNC: 4.27 UIU/ML (ref 0.4–4.2)
WBC # BLD AUTO: 8.6 THOU/MM3 (ref 4.8–10.8)

## 2024-03-07 PROCEDURE — 83605 ASSAY OF LACTIC ACID: CPT

## 2024-03-07 PROCEDURE — 6370000000 HC RX 637 (ALT 250 FOR IP)

## 2024-03-07 PROCEDURE — G0378 HOSPITAL OBSERVATION PER HR: HCPCS

## 2024-03-07 PROCEDURE — 82948 REAGENT STRIP/BLOOD GLUCOSE: CPT

## 2024-03-07 PROCEDURE — 99285 EMERGENCY DEPT VISIT HI MDM: CPT

## 2024-03-07 PROCEDURE — 36415 COLL VENOUS BLD VENIPUNCTURE: CPT

## 2024-03-07 PROCEDURE — 93005 ELECTROCARDIOGRAM TRACING: CPT

## 2024-03-07 PROCEDURE — 96374 THER/PROPH/DIAG INJ IV PUSH: CPT

## 2024-03-07 PROCEDURE — 36600 WITHDRAWAL OF ARTERIAL BLOOD: CPT

## 2024-03-07 PROCEDURE — 96365 THER/PROPH/DIAG IV INF INIT: CPT

## 2024-03-07 PROCEDURE — 96375 TX/PRO/DX INJ NEW DRUG ADDON: CPT

## 2024-03-07 PROCEDURE — 84443 ASSAY THYROID STIM HORMONE: CPT

## 2024-03-07 PROCEDURE — 82140 ASSAY OF AMMONIA: CPT

## 2024-03-07 PROCEDURE — 84439 ASSAY OF FREE THYROXINE: CPT

## 2024-03-07 PROCEDURE — 84145 PROCALCITONIN (PCT): CPT

## 2024-03-07 PROCEDURE — 87636 SARSCOV2 & INF A&B AMP PRB: CPT

## 2024-03-07 PROCEDURE — 6360000002 HC RX W HCPCS: Performed by: EMERGENCY MEDICINE

## 2024-03-07 PROCEDURE — 83880 ASSAY OF NATRIURETIC PEPTIDE: CPT

## 2024-03-07 PROCEDURE — 6360000002 HC RX W HCPCS

## 2024-03-07 PROCEDURE — 71045 X-RAY EXAM CHEST 1 VIEW: CPT

## 2024-03-07 PROCEDURE — 83690 ASSAY OF LIPASE: CPT

## 2024-03-07 PROCEDURE — 93010 ELECTROCARDIOGRAM REPORT: CPT | Performed by: NUCLEAR MEDICINE

## 2024-03-07 PROCEDURE — 85025 COMPLETE CBC W/AUTO DIFF WBC: CPT

## 2024-03-07 PROCEDURE — 84484 ASSAY OF TROPONIN QUANT: CPT

## 2024-03-07 PROCEDURE — 82077 ASSAY SPEC XCP UR&BREATH IA: CPT

## 2024-03-07 PROCEDURE — 93005 ELECTROCARDIOGRAM TRACING: CPT | Performed by: EMERGENCY MEDICINE

## 2024-03-07 PROCEDURE — 99223 1ST HOSP IP/OBS HIGH 75: CPT

## 2024-03-07 PROCEDURE — 82803 BLOOD GASES ANY COMBINATION: CPT

## 2024-03-07 PROCEDURE — 2700000000 HC OXYGEN THERAPY PER DAY

## 2024-03-07 PROCEDURE — 6370000000 HC RX 637 (ALT 250 FOR IP): Performed by: EMERGENCY MEDICINE

## 2024-03-07 PROCEDURE — 85379 FIBRIN DEGRADATION QUANT: CPT

## 2024-03-07 PROCEDURE — 96366 THER/PROPH/DIAG IV INF ADDON: CPT

## 2024-03-07 PROCEDURE — 94640 AIRWAY INHALATION TREATMENT: CPT

## 2024-03-07 PROCEDURE — 80053 COMPREHEN METABOLIC PANEL: CPT

## 2024-03-07 PROCEDURE — 94761 N-INVAS EAR/PLS OXIMETRY MLT: CPT

## 2024-03-07 RX ORDER — SODIUM CHLORIDE 0.9 % (FLUSH) 0.9 %
10 SYRINGE (ML) INJECTION PRN
Status: DISCONTINUED | OUTPATIENT
Start: 2024-03-07 | End: 2024-03-11 | Stop reason: HOSPADM

## 2024-03-07 RX ORDER — SODIUM CHLORIDE 9 MG/ML
INJECTION, SOLUTION INTRAVENOUS PRN
Status: DISCONTINUED | OUTPATIENT
Start: 2024-03-07 | End: 2024-03-11 | Stop reason: HOSPADM

## 2024-03-07 RX ORDER — BUMETANIDE 1 MG/1
2 TABLET ORAL DAILY
Status: DISCONTINUED | OUTPATIENT
Start: 2024-03-08 | End: 2024-03-11 | Stop reason: HOSPADM

## 2024-03-07 RX ORDER — GLUCAGON 1 MG/ML
1 KIT INJECTION PRN
Status: DISCONTINUED | OUTPATIENT
Start: 2024-03-07 | End: 2024-03-11 | Stop reason: HOSPADM

## 2024-03-07 RX ORDER — SODIUM CHLORIDE 0.9 % (FLUSH) 0.9 %
10 SYRINGE (ML) INJECTION EVERY 12 HOURS SCHEDULED
Status: DISCONTINUED | OUTPATIENT
Start: 2024-03-07 | End: 2024-03-11 | Stop reason: HOSPADM

## 2024-03-07 RX ORDER — IPRATROPIUM BROMIDE AND ALBUTEROL SULFATE 2.5; .5 MG/3ML; MG/3ML
1 SOLUTION RESPIRATORY (INHALATION) ONCE
Status: COMPLETED | OUTPATIENT
Start: 2024-03-07 | End: 2024-03-07

## 2024-03-07 RX ORDER — ONDANSETRON 4 MG/1
4 TABLET, ORALLY DISINTEGRATING ORAL EVERY 8 HOURS PRN
Status: DISCONTINUED | OUTPATIENT
Start: 2024-03-07 | End: 2024-03-11 | Stop reason: HOSPADM

## 2024-03-07 RX ORDER — IPRATROPIUM BROMIDE AND ALBUTEROL SULFATE 2.5; .5 MG/3ML; MG/3ML
1 SOLUTION RESPIRATORY (INHALATION) 3 TIMES DAILY
Status: DISCONTINUED | OUTPATIENT
Start: 2024-03-08 | End: 2024-03-08

## 2024-03-07 RX ORDER — IPRATROPIUM BROMIDE AND ALBUTEROL SULFATE 2.5; .5 MG/3ML; MG/3ML
1 SOLUTION RESPIRATORY (INHALATION)
Status: DISCONTINUED | OUTPATIENT
Start: 2024-03-07 | End: 2024-03-08

## 2024-03-07 RX ORDER — POTASSIUM CHLORIDE 7.45 MG/ML
10 INJECTION INTRAVENOUS PRN
Status: DISCONTINUED | OUTPATIENT
Start: 2024-03-07 | End: 2024-03-11 | Stop reason: HOSPADM

## 2024-03-07 RX ORDER — ACETAMINOPHEN 650 MG/1
650 SUPPOSITORY RECTAL EVERY 6 HOURS PRN
Status: DISCONTINUED | OUTPATIENT
Start: 2024-03-07 | End: 2024-03-11 | Stop reason: HOSPADM

## 2024-03-07 RX ORDER — ISOSORBIDE MONONITRATE 60 MG/1
120 TABLET, EXTENDED RELEASE ORAL DAILY
Status: DISCONTINUED | OUTPATIENT
Start: 2024-03-08 | End: 2024-03-07

## 2024-03-07 RX ORDER — ONDANSETRON 2 MG/ML
4 INJECTION INTRAMUSCULAR; INTRAVENOUS EVERY 6 HOURS PRN
Status: DISCONTINUED | OUTPATIENT
Start: 2024-03-07 | End: 2024-03-11 | Stop reason: HOSPADM

## 2024-03-07 RX ORDER — LANOLIN ALCOHOL/MO/W.PET/CERES
100 CREAM (GRAM) TOPICAL DAILY
Status: DISCONTINUED | OUTPATIENT
Start: 2024-03-08 | End: 2024-03-11 | Stop reason: HOSPADM

## 2024-03-07 RX ORDER — BUPROPION HYDROCHLORIDE 150 MG/1
150 TABLET ORAL EVERY MORNING
Status: DISCONTINUED | OUTPATIENT
Start: 2024-03-08 | End: 2024-03-11 | Stop reason: HOSPADM

## 2024-03-07 RX ORDER — MEXILETINE HYDROCHLORIDE 150 MG/1
300 CAPSULE ORAL EVERY 8 HOURS SCHEDULED
Status: DISCONTINUED | OUTPATIENT
Start: 2024-03-07 | End: 2024-03-11 | Stop reason: HOSPADM

## 2024-03-07 RX ORDER — MAGNESIUM SULFATE IN WATER 40 MG/ML
2000 INJECTION, SOLUTION INTRAVENOUS PRN
Status: DISCONTINUED | OUTPATIENT
Start: 2024-03-07 | End: 2024-03-11 | Stop reason: HOSPADM

## 2024-03-07 RX ORDER — INSULIN GLARGINE 100 [IU]/ML
10 INJECTION, SOLUTION SUBCUTANEOUS NIGHTLY
Status: DISCONTINUED | OUTPATIENT
Start: 2024-03-07 | End: 2024-03-11 | Stop reason: HOSPADM

## 2024-03-07 RX ORDER — ATORVASTATIN CALCIUM 40 MG/1
40 TABLET, FILM COATED ORAL NIGHTLY
Status: DISCONTINUED | OUTPATIENT
Start: 2024-03-07 | End: 2024-03-08

## 2024-03-07 RX ORDER — MORPHINE SULFATE 2 MG/ML
2 INJECTION, SOLUTION INTRAMUSCULAR; INTRAVENOUS ONCE
Status: COMPLETED | OUTPATIENT
Start: 2024-03-08 | End: 2024-03-08

## 2024-03-07 RX ORDER — DEXTROSE MONOHYDRATE 100 MG/ML
INJECTION, SOLUTION INTRAVENOUS CONTINUOUS PRN
Status: DISCONTINUED | OUTPATIENT
Start: 2024-03-07 | End: 2024-03-11 | Stop reason: HOSPADM

## 2024-03-07 RX ORDER — MULTIVITAMIN WITH IRON
1 TABLET ORAL DAILY
Status: DISCONTINUED | OUTPATIENT
Start: 2024-03-08 | End: 2024-03-11 | Stop reason: HOSPADM

## 2024-03-07 RX ORDER — HYDRALAZINE HYDROCHLORIDE 50 MG/1
100 TABLET, FILM COATED ORAL 3 TIMES DAILY
Status: DISCONTINUED | OUTPATIENT
Start: 2024-03-07 | End: 2024-03-11 | Stop reason: HOSPADM

## 2024-03-07 RX ORDER — ALBUTEROL SULFATE 2.5 MG/3ML
2.5 SOLUTION RESPIRATORY (INHALATION) EVERY 4 HOURS PRN
Status: DISCONTINUED | OUTPATIENT
Start: 2024-03-07 | End: 2024-03-11 | Stop reason: HOSPADM

## 2024-03-07 RX ORDER — MORPHINE SULFATE 4 MG/ML
4 INJECTION, SOLUTION INTRAMUSCULAR; INTRAVENOUS ONCE
Status: COMPLETED | OUTPATIENT
Start: 2024-03-07 | End: 2024-03-07

## 2024-03-07 RX ORDER — ONDANSETRON 2 MG/ML
4 INJECTION INTRAMUSCULAR; INTRAVENOUS ONCE
Status: COMPLETED | OUTPATIENT
Start: 2024-03-07 | End: 2024-03-07

## 2024-03-07 RX ORDER — AMIODARONE HYDROCHLORIDE 200 MG/1
200 TABLET ORAL DAILY
Status: DISCONTINUED | OUTPATIENT
Start: 2024-03-08 | End: 2024-03-08

## 2024-03-07 RX ORDER — POTASSIUM CHLORIDE 20 MEQ/1
40 TABLET, EXTENDED RELEASE ORAL PRN
Status: DISCONTINUED | OUTPATIENT
Start: 2024-03-07 | End: 2024-03-11 | Stop reason: HOSPADM

## 2024-03-07 RX ORDER — ACETAMINOPHEN 325 MG/1
650 TABLET ORAL EVERY 6 HOURS PRN
Status: DISCONTINUED | OUTPATIENT
Start: 2024-03-07 | End: 2024-03-11 | Stop reason: HOSPADM

## 2024-03-07 RX ORDER — METOPROLOL SUCCINATE 100 MG/1
100 TABLET, EXTENDED RELEASE ORAL DAILY
Status: DISCONTINUED | OUTPATIENT
Start: 2024-03-08 | End: 2024-03-11

## 2024-03-07 RX ORDER — INSULIN LISPRO 100 [IU]/ML
0-8 INJECTION, SOLUTION INTRAVENOUS; SUBCUTANEOUS
Status: DISCONTINUED | OUTPATIENT
Start: 2024-03-08 | End: 2024-03-08

## 2024-03-07 RX ORDER — NITROGLYCERIN 20 MG/100ML
5-200 INJECTION INTRAVENOUS CONTINUOUS
Status: DISCONTINUED | OUTPATIENT
Start: 2024-03-07 | End: 2024-03-08

## 2024-03-07 RX ORDER — BUMETANIDE 0.25 MG/ML
1 INJECTION INTRAMUSCULAR; INTRAVENOUS ONCE
Status: COMPLETED | OUTPATIENT
Start: 2024-03-07 | End: 2024-03-07

## 2024-03-07 RX ORDER — ALPRAZOLAM 0.5 MG/1
0.5 TABLET ORAL NIGHTLY PRN
Status: DISCONTINUED | OUTPATIENT
Start: 2024-03-07 | End: 2024-03-11 | Stop reason: HOSPADM

## 2024-03-07 RX ORDER — POLYETHYLENE GLYCOL 3350 17 G/17G
17 POWDER, FOR SOLUTION ORAL DAILY PRN
Status: DISCONTINUED | OUTPATIENT
Start: 2024-03-07 | End: 2024-03-11 | Stop reason: HOSPADM

## 2024-03-07 RX ORDER — INSULIN LISPRO 100 [IU]/ML
0-4 INJECTION, SOLUTION INTRAVENOUS; SUBCUTANEOUS NIGHTLY
Status: DISCONTINUED | OUTPATIENT
Start: 2024-03-07 | End: 2024-03-08

## 2024-03-07 RX ADMIN — TICAGRELOR 90 MG: 90 TABLET ORAL at 22:17

## 2024-03-07 RX ADMIN — ONDANSETRON 4 MG: 2 INJECTION INTRAMUSCULAR; INTRAVENOUS at 16:53

## 2024-03-07 RX ADMIN — MEXILETINE HYDROCHLORIDE 300 MG: 150 CAPSULE ORAL at 22:16

## 2024-03-07 RX ADMIN — ACETAMINOPHEN 650 MG: 325 TABLET ORAL at 22:15

## 2024-03-07 RX ADMIN — BUMETANIDE 1 MG: 0.25 INJECTION INTRAMUSCULAR; INTRAVENOUS at 22:17

## 2024-03-07 RX ADMIN — NITROGLYCERIN 5 MCG/MIN: 20 INJECTION INTRAVENOUS at 22:15

## 2024-03-07 RX ADMIN — IPRATROPIUM BROMIDE AND ALBUTEROL SULFATE 1 DOSE: .5; 3 SOLUTION RESPIRATORY (INHALATION) at 22:58

## 2024-03-07 RX ADMIN — ATORVASTATIN CALCIUM 40 MG: 40 TABLET, FILM COATED ORAL at 22:16

## 2024-03-07 RX ADMIN — HYDRALAZINE HYDROCHLORIDE 100 MG: 50 TABLET ORAL at 22:16

## 2024-03-07 RX ADMIN — IPRATROPIUM BROMIDE AND ALBUTEROL SULFATE 1 DOSE: .5; 3 SOLUTION RESPIRATORY (INHALATION) at 17:04

## 2024-03-07 RX ADMIN — INSULIN GLARGINE 10 UNITS: 100 INJECTION, SOLUTION SUBCUTANEOUS at 22:15

## 2024-03-07 RX ADMIN — MORPHINE SULFATE 4 MG: 4 INJECTION, SOLUTION INTRAMUSCULAR; INTRAVENOUS at 16:54

## 2024-03-07 ASSESSMENT — PAIN SCALES - GENERAL
PAINLEVEL_OUTOF10: 8
PAINLEVEL_OUTOF10: 7
PAINLEVEL_OUTOF10: 7
PAINLEVEL_OUTOF10: 6
PAINLEVEL_OUTOF10: 0
PAINLEVEL_OUTOF10: 6
PAINLEVEL_OUTOF10: 6

## 2024-03-07 ASSESSMENT — PAIN DESCRIPTION - LOCATION
LOCATION: ABDOMEN;CHEST
LOCATION: CHEST

## 2024-03-07 ASSESSMENT — PAIN DESCRIPTION - DESCRIPTORS
DESCRIPTORS: ACHING;SORE
DESCRIPTORS: ACHING;SORE

## 2024-03-07 ASSESSMENT — PAIN DESCRIPTION - PAIN TYPE: TYPE: CHRONIC PAIN

## 2024-03-07 ASSESSMENT — PAIN - FUNCTIONAL ASSESSMENT
PAIN_FUNCTIONAL_ASSESSMENT: 0-10
PAIN_FUNCTIONAL_ASSESSMENT: 0-10
PAIN_FUNCTIONAL_ASSESSMENT: ACTIVITIES ARE NOT PREVENTED

## 2024-03-07 ASSESSMENT — PAIN DESCRIPTION - ORIENTATION: ORIENTATION: LEFT

## 2024-03-07 ASSESSMENT — PAIN DESCRIPTION - FREQUENCY: FREQUENCY: CONTINUOUS

## 2024-03-07 ASSESSMENT — PAIN DESCRIPTION - ONSET: ONSET: ON-GOING

## 2024-03-07 NOTE — CARE COORDINATION
Care Transitions Outreach Attempt    Attempted to reach patient for transitions of care follow up. Unable to reach patient.    Patient: Garrett Duncan Patient : 1965 MRN: [id number]    Last Discharge Facility       Date Complaint Diagnosis Description Type Department Provider    24 Abdominal Pain; Alcohol Problem Calculus of GB and bile duct w acute cholecyst w/o obst ... ED to Hosp-Admission (Discharged) (ADMITTED) Vandana Henry MD; Stefan, ...            Noted following upcoming appointments from discharge chart review:   [facility] follow up appointment(s):   Future Appointments   Date Time Provider Department Center   3/7/2024  3:15 PM Leonel Adorno, APRN - CNP [facility] [place]   3/25/2024  3:00 PM Samuel Kathleen MD [facility] [place]   2024  3:30 PM [facility]   2024  3:30 PM Leonel Adorno APRN - CNP [facility] [place]   2024  3:30 PM Shelly Johnson MD [facility] [place]   2025  1:45 PM Fany Guzmán MD [facility] [place]     Non-[facility]  follow up appointment(s):     Attempted to contact pt for care transition follow up.  Unable to reach pt.  Left message with contact information and request for call back.      Pt has an appt with PCP today at 3:15.      Jennifer Cevallos RN

## 2024-03-07 NOTE — TELEPHONE ENCOUNTER
Will approach subject and concerns at visit today - but patient has to come to that conclusion and decision to enter rehab

## 2024-03-07 NOTE — TELEPHONE ENCOUNTER
Daughter Jesica on HIPAA and POA called office. Says she is worried about pt alcohol use. He is a recovering addict and feels he is withdrawing. She is worried about pt losing his job and how is going to pay for all of his medical bills. Jesica says pt's work will pay for alcohol rehab, but pt won't take that next step. He was in the hospital recently and has a hospital follow up scheduled with you today at 3:15pm.     Jesica tries to talk to pt and he is not forthcoming with information and just says she is \"being nosey and to stay out of his business\".

## 2024-03-07 NOTE — H&P
Hospitalist - History & Physical      Patient: Garrett Duncan    Unit/Bed:8B-24/024-A  YOB: 1965  MRN: [id number]   Acct: [id number]   PCP: Leonel Adorno, ISABELLA - CNP    Date of Service: Pt seen/examined on 03/07/24  and Admitted to Inpatient with expected LOS greater than two midnights due to medical therapy.     Chief Complaint:  shortness of breath    Assessment and Plan:  Acute  respiratory failure, secondary to suspected acute on chronic HFrEF exacerbation with history of ICD:   Patient requiring 4L O2 NC up from baseline of room air.  No documented hypoxia, however RN at bedside reports patient drops O2 levels intermittently.  Patient very somnolent on exam, otherwise afebrile, hemodynamically stable, intermittently tachypneic.  Patient noted to have increased work of breathing and using accessory muscles on 4L O2 NC.  Stat ABG ordered, pH 7.26, pCO2 48, HCO3 21. No acute leukocytosis, patient afebrile.  CXR reveals bilateral infiltrates versus edema.  Patient reports noncompliant with Bumex at home.  Give Bumex 1 mg IV now.  Monitor response.  Patient refusing BiPAP at this time, will trial heated high flow, repeat ABG in 4 hours.  Echo, telemetry.  Pro-Rde ordered, respiratory viral panel and culture ordered.    Atypical chest pain:   Patient endorsing left-sided chest pain, without radiation, associated with #1.  Troponin 118, however chronically elevated per chart review.  Will continue to trend, repeat EKG, monitor telemetry and monitor symptoms closely. Start nitro gtt.     Accelerated Essential HTN:   BP stable, however consistently > 160mmHg. Continue home meds. Starting nitro gtt for #1, #2.     Abdominal pain, with history of chronic pancreatitis:   Generalized abdominal pain ongoing since last admission 2/29/2024 to 3/3/2024.  Unclear etiology at this time, lipase ordered.    CKD:   Cr 3.0 today, at baseline. UA ordered. Continue to monitor.  Avoid nephrotoxic agents.    History of

## 2024-03-07 NOTE — PROGRESS NOTES
gallstones and positive sonographic Juárez's sign reflecting acute cholecystitis. However General surgery not really convinced as labs and clinical exam are not consistent. Normal WBC count, LFT's WNL. HIDA scan done on 3/1 showing patent cystic ducts without evidence of acute cholecystitis. Patient is poor surgical candidate, recommend conservative treatment.     --3/2: Calculus cholecystitis on imaging. Labs and clinical exam are not consistent with this, HIDA Scan showing patent cystic ducts without evidence of acute cholecystitis. General surgery signed off. Appears to be more anxiety related, abdomen is soft. HD stable.   --3/3: abdominal pain improved, tolerating regular diet. HD stable. No fevers, black or red colored stools. Discharge on Bentyl and PPI. Educated on alcohol cessation.            # BABAK on CKD: Baseline creatinine of 2.8-3.1.  Creatinine of 3.8 on admission. Nephrology consulted. Cont NS infusion. Will monitor BMP daily and avoid nephrotoxic agents.  Will renally dose medications.     --3/2: Cr still 3.8 today, Nephrology started on bicarb drip. Cont to trend daily. Avoid nephrotoxic agents. Strict I/Os.   --3/3: Cr down to 3.6. Could be new baseline, Nephrology ok with discharge with close follow-up with his Nephrologist in 2 weeks. Resume Bumex. Educated on alcohol cessation. Avoid all NSAIDs.            # Alcohol abuse, at risk for withdrawal: EtOH level of 0.15% on admission. Patient reports drinking fifth of liquor on daily basis, he especially started drinking significantly more over the past week.  Last drink was this a.m (2/29). He wants to quit alcohol use.  On CIWA protocol with as needed Ativan.  He refused phenobarb in the past, thus will avoid.  No history of alcohol withdrawal seizures or DT.      # Permanent atrial fibrillation: resume warfarin at discharge.      # HFrEF secondary to dilated cardiomyopathy secondary to alcohol abuse: EF of 30 to 35% on TTE on 1/2/2024.  Patient

## 2024-03-07 NOTE — ED PROVIDER NOTES
[facility] EMERGENCY DEPT  [address] [facility] [address]  Phone: [phone]      CHIEF COMPLAINT       Chief Complaint   Patient presents with    Shortness of Breath       Nurses Notes reviewed and I agree except as noted in the HPI.      HISTORY OF PRESENT ILLNESS    Garrett Duncan is a 58 y.o. male.      Patient presents the emergency department with acute dyspnea as well as chest pain.  Has underlying history of CHF but is also a smoker.  Has history of coronary artery stenting and has a pacemaker defibrillator.  It sounds like he has had some edema in the past.  He is he is symptomatic at rest.  Does not appear to be hypoxic.  No fever reported    REVIEW OF SYSTEMS         No abdominal pain    Remainder of review of systems is otherwise reviewed as negative.      PAST MEDICAL HISTORY    has a past medical history of Acute systolic CHF (congestive heart failure) ([facility]), Alcohol abuse, Anomalous origin of right coronary artery, CAD (coronary artery disease), Chronic kidney disease, Diabetes mellitus (HCC), Drug abuse (HCC), GERD (gastroesophageal reflux disease), History of implantable cardiac defibrillator (ICD), Hypertension, Intradural extramedullary spinal tumor, Long term (current) use of anticoagulants, Medtronic dual icd , Neuromuscular disorder ([facility]), Paroxysmal atrial fibrillation (HCC), Severe obesity (BMI 35.0-39.9) with comorbidity (HCC), and V-tach ([facility]).    SURGICAL HISTORY      has a past surgical history that includes Cardiac catheterization (03/20/2014); Coronary artery bypass graft (05/09/2014); other surgical history (Left, 10/21/2014); EKG 12 Lead (07/17/2015); Cardiac defibrillator placement (10/13/2015); vascular surgery; pacemaker placement; pr laminectomy w/o ffd > 2 vert seg lumbar (N/A, 01/16/2018); and Cardiac electrophysiology study and ablation.    CURRENT MEDICATIONS       Previous Medications    ALPRAZOLAM (XANAX) 0.5 MG TABLET    TAKE 1 TABLET BY MOUTH AT BEDTIME FOR ANXIETY

## 2024-03-08 ENCOUNTER — APPOINTMENT (OUTPATIENT)
Age: 59
DRG: 291 | End: 2024-03-08
Payer: COMMERCIAL

## 2024-03-08 ENCOUNTER — APPOINTMENT (OUTPATIENT)
Dept: GENERAL RADIOLOGY | Age: 59
DRG: 291 | End: 2024-03-08
Payer: COMMERCIAL

## 2024-03-08 PROBLEM — E87.4 MIXED ACID BASE BALANCE DISORDER: Status: ACTIVE | Noted: 2024-03-08

## 2024-03-08 PROBLEM — I48.0 PAF (PAROXYSMAL ATRIAL FIBRILLATION) (HCC): Status: ACTIVE | Noted: 2024-03-08

## 2024-03-08 PROBLEM — E87.79 OTHER FLUID OVERLOAD: Status: ACTIVE | Noted: 2024-03-08

## 2024-03-08 PROBLEM — I47.20 V-TACH (HCC): Status: ACTIVE | Noted: 2024-03-08

## 2024-03-08 PROBLEM — J96.01 ACUTE HYPOXEMIC RESPIRATORY FAILURE (HCC): Status: ACTIVE | Noted: 2024-03-08

## 2024-03-08 LAB
ALBUMIN SERPL BCG-MCNC: 3.5 G/DL (ref 3.5–5.1)
ALP SERPL-CCNC: 113 U/L (ref 38–126)
ALT SERPL W/O P-5'-P-CCNC: 14 U/L (ref 11–66)
ANION GAP SERPL CALC-SCNC: 11 MEQ/L (ref 8–16)
ANION GAP SERPL CALC-SCNC: 14 MEQ/L (ref 8–16)
ANION GAP SERPL CALC-SCNC: 14 MEQ/L (ref 8–16)
ANISOCYTOSIS BLD QL SMEAR: PRESENT
APTT PPP: 44.2 SECONDS (ref 22–38)
ARTERIAL PATENCY WRIST A: ABNORMAL
ARTERIAL PATENCY WRIST A: ABNORMAL
ARTERIAL PATENCY WRIST A: POSITIVE
AST SERPL-CCNC: 16 U/L (ref 5–40)
BASE EXCESS BLDA CALC-SCNC: -5.2 MMOL/L (ref -2.5–2.5)
BASE EXCESS BLDA CALC-SCNC: -5.6 MMOL/L (ref -2.5–2.5)
BASE EXCESS BLDA CALC-SCNC: -6 MMOL/L (ref -2.5–2.5)
BASOPHILS ABSOLUTE: 0.1 THOU/MM3 (ref 0–0.1)
BASOPHILS NFR BLD AUTO: 1 %
BDY SITE: ABNORMAL
BILIRUB CONJ SERPL-MCNC: < 0.2 MG/DL (ref 0–0.3)
BILIRUB SERPL-MCNC: 0.3 MG/DL (ref 0.3–1.2)
BREATHS SETTING VENT: 10 BPM
BUN SERPL-MCNC: 29 MG/DL (ref 7–22)
BUN SERPL-MCNC: 31 MG/DL (ref 7–22)
BUN SERPL-MCNC: 31 MG/DL (ref 7–22)
CA-I BLD ISE-SCNC: 1.07 MMOL/L (ref 1.12–1.32)
CA-I BLD ISE-SCNC: 1.19 MMOL/L (ref 1.12–1.32)
CALCIUM SERPL-MCNC: 8.3 MG/DL (ref 8.5–10.5)
CALCIUM SERPL-MCNC: 8.3 MG/DL (ref 8.5–10.5)
CALCIUM SERPL-MCNC: 8.5 MG/DL (ref 8.5–10.5)
CHLORIDE SERPL-SCNC: 107 MEQ/L (ref 98–111)
CHLORIDE SERPL-SCNC: 109 MEQ/L (ref 98–111)
CHLORIDE SERPL-SCNC: 112 MEQ/L (ref 98–111)
CO2 SERPL-SCNC: 18 MEQ/L (ref 23–33)
CO2 SERPL-SCNC: 20 MEQ/L (ref 23–33)
CO2 SERPL-SCNC: 20 MEQ/L (ref 23–33)
COLLECTED BY:: ABNORMAL
CREAT SERPL-MCNC: 3.1 MG/DL (ref 0.4–1.2)
CREAT SERPL-MCNC: 3.3 MG/DL (ref 0.4–1.2)
CREAT SERPL-MCNC: 3.3 MG/DL (ref 0.4–1.2)
DEPRECATED RDW RBC AUTO: 65.8 FL (ref 35–45)
DEPRECATED RDW RBC AUTO: 67.6 FL (ref 35–45)
DEVICE: ABNORMAL
ECHO AO ASC DIAM: 3.3 CM
ECHO AO ASCENDING AORTA INDEX: 1.33 CM/M2
ECHO AV CUSP MM: 2 CM
ECHO BSA: 2.56 M2
ECHO LA DIAMETER INDEX: 2.05 CM/M2
ECHO LA DIAMETER: 5.1 CM
ECHO LV EDV A4C: 226 ML
ECHO LV EDV INDEX A4C: 91 ML/M2
ECHO LV EJECTION FRACTION A4C: 30 %
ECHO LV ESV A4C: 160 ML
ECHO LV ESV INDEX A4C: 64 ML/M2
ECHO LV FRACTIONAL SHORTENING: 11 % (ref 28–44)
ECHO LV INTERNAL DIMENSION DIASTOLE INDEX: 2.57 CM/M2
ECHO LV INTERNAL DIMENSION DIASTOLIC: 6.4 CM (ref 4.2–5.9)
ECHO LV INTERNAL DIMENSION SYSTOLIC INDEX: 2.29 CM/M2
ECHO LV INTERNAL DIMENSION SYSTOLIC: 5.7 CM
ECHO LV IVSD: 1.3 CM (ref 0.6–1)
ECHO LV MASS 2D: 389 G (ref 88–224)
ECHO LV MASS INDEX 2D: 156.2 G/M2 (ref 49–115)
ECHO LV POSTERIOR WALL DIASTOLIC: 1.3 CM (ref 0.6–1)
ECHO LV RELATIVE WALL THICKNESS RATIO: 0.41
ECHO RV INTERNAL DIMENSION: 4 CM
EKG ATRIAL RATE: 62 BPM
EKG ATRIAL RATE: 74 BPM
EKG P AXIS: 46 DEGREES
EKG P AXIS: 88 DEGREES
EKG P-R INTERVAL: 200 MS
EKG P-R INTERVAL: 214 MS
EKG Q-T INTERVAL: 420 MS
EKG Q-T INTERVAL: 426 MS
EKG Q-T INTERVAL: 440 MS
EKG Q-T INTERVAL: 452 MS
EKG QRS DURATION: 104 MS
EKG QRS DURATION: 172 MS
EKG QRS DURATION: 176 MS
EKG QRS DURATION: 98 MS
EKG QTC CALCULATION (BAZETT): 458 MS
EKG QTC CALCULATION (BAZETT): 488 MS
EKG QTC CALCULATION (BAZETT): 608 MS
EKG QTC CALCULATION (BAZETT): 619 MS
EKG R AXIS: -5 DEGREES
EKG R AXIS: 3 DEGREES
EKG R AXIS: 52 DEGREES
EKG R AXIS: 53 DEGREES
EKG T AXIS: -118 DEGREES
EKG T AXIS: -120 DEGREES
EKG T AXIS: 115 DEGREES
EKG T AXIS: 124 DEGREES
EKG VENTRICULAR RATE: 126 BPM
EKG VENTRICULAR RATE: 127 BPM
EKG VENTRICULAR RATE: 62 BPM
EKG VENTRICULAR RATE: 74 BPM
EOSINOPHIL NFR BLD AUTO: 2.4 %
EOSINOPHILS ABSOLUTE: 0.2 THOU/MM3 (ref 0–0.4)
ERYTHROCYTE [DISTWIDTH] IN BLOOD BY AUTOMATED COUNT: 17.9 % (ref 11.5–14.5)
ERYTHROCYTE [DISTWIDTH] IN BLOOD BY AUTOMATED COUNT: 17.9 % (ref 11.5–14.5)
FIO2 ON VENT O2 ANALYZER: 40 %
FIO2 ON VENT O2 ANALYZER: 50 %
FIO2 ON VENT O2 ANALYZER: 60 %
GFR SERPL CREATININE-BSD FRML MDRD: 21 ML/MIN/1.73M2
GFR SERPL CREATININE-BSD FRML MDRD: 21 ML/MIN/1.73M2
GFR SERPL CREATININE-BSD FRML MDRD: 22 ML/MIN/1.73M2
GLUCOSE BLD STRIP.AUTO-MCNC: 170 MG/DL (ref 70–108)
GLUCOSE BLD STRIP.AUTO-MCNC: 176 MG/DL (ref 70–108)
GLUCOSE BLD STRIP.AUTO-MCNC: 182 MG/DL (ref 70–108)
GLUCOSE BLD STRIP.AUTO-MCNC: 196 MG/DL (ref 70–108)
GLUCOSE SERPL-MCNC: 141 MG/DL (ref 70–108)
GLUCOSE SERPL-MCNC: 164 MG/DL (ref 70–108)
GLUCOSE SERPL-MCNC: 199 MG/DL (ref 70–108)
HCO3 BLDA-SCNC: 21 MMOL/L (ref 23–28)
HCO3 BLDA-SCNC: 22 MMOL/L (ref 23–28)
HCO3 BLDA-SCNC: 22 MMOL/L (ref 23–28)
HCT VFR BLD AUTO: 32.4 % (ref 42–52)
HCT VFR BLD AUTO: 32.6 % (ref 42–52)
HEPARIN UNFRACTIONATED: < 0.04 U/ML (ref 0.3–0.7)
HGB BLD-MCNC: 9.2 GM/DL (ref 14–18)
HGB BLD-MCNC: 9.3 GM/DL (ref 14–18)
HYPOCHROMIA BLD QL SMEAR: PRESENT
IMM GRANULOCYTES # BLD AUTO: 0.07 THOU/MM3 (ref 0–0.07)
IMM GRANULOCYTES NFR BLD AUTO: 0.9 %
INR PPP: 2.05 (ref 0.85–1.13)
INR PPP: 2.14 (ref 0.85–1.13)
LYMPHOCYTES ABSOLUTE: 0.7 THOU/MM3 (ref 1–4.8)
LYMPHOCYTES NFR BLD AUTO: 8.1 %
MAGNESIUM SERPL-MCNC: 2.6 MG/DL (ref 1.6–2.4)
MAGNESIUM SERPL-MCNC: 2.8 MG/DL (ref 1.6–2.4)
MCH RBC QN AUTO: 29.2 PG (ref 26–33)
MCH RBC QN AUTO: 29.7 PG (ref 26–33)
MCHC RBC AUTO-ENTMCNC: 28.4 GM/DL (ref 32.2–35.5)
MCHC RBC AUTO-ENTMCNC: 28.5 GM/DL (ref 32.2–35.5)
MCV RBC AUTO: 102.5 FL (ref 80–94)
MCV RBC AUTO: 104.5 FL (ref 80–94)
MONOCYTES ABSOLUTE: 0.9 THOU/MM3 (ref 0.4–1.3)
MONOCYTES NFR BLD AUTO: 11.4 %
NEUTROPHILS NFR BLD AUTO: 76.2 %
NRBC BLD AUTO-RTO: 0 /100 WBC
PCO2 BLDA: 47 MMHG (ref 35–45)
PCO2 BLDA: 51 MMHG (ref 35–45)
PCO2 BLDA: 55 MMHG (ref 35–45)
PEEP SETTING VENT: 6 MMHG
PH BLDA: 7.22 [PH] (ref 7.35–7.45)
PH BLDA: 7.24 [PH] (ref 7.35–7.45)
PH BLDA: 7.26 [PH] (ref 7.35–7.45)
PHOSPHATE SERPL-MCNC: 4.2 MG/DL (ref 2.4–4.7)
PHOSPHATE SERPL-MCNC: 4.4 MG/DL (ref 2.4–4.7)
PIP: 12 CMH2O
PLATELET # BLD AUTO: 173 THOU/MM3 (ref 130–400)
PLATELET # BLD AUTO: 180 THOU/MM3 (ref 130–400)
PMV BLD AUTO: 10.1 FL (ref 9.4–12.4)
PMV BLD AUTO: 10.3 FL (ref 9.4–12.4)
PO2 BLDA: 135 MMHG (ref 71–104)
PO2 BLDA: 164 MMHG (ref 71–104)
PO2 BLDA: 92 MMHG (ref 71–104)
POTASSIUM SERPL-SCNC: 4.2 MEQ/L (ref 3.5–5.2)
POTASSIUM SERPL-SCNC: 4.4 MEQ/L (ref 3.5–5.2)
POTASSIUM SERPL-SCNC: 4.6 MEQ/L (ref 3.5–5.2)
PROT SERPL-MCNC: 6.2 G/DL (ref 6.1–8)
RBC # BLD AUTO: 3.1 MILL/MM3 (ref 4.7–6.1)
RBC # BLD AUTO: 3.18 MILL/MM3 (ref 4.7–6.1)
SAO2 % BLDA: 96 %
SAO2 % BLDA: 98 %
SAO2 % BLDA: 99 %
SEGMENTED NEUTROPHILS ABSOLUTE COUNT: 6.2 THOU/MM3 (ref 1.8–7.7)
SODIUM SERPL-SCNC: 138 MEQ/L (ref 135–145)
SODIUM SERPL-SCNC: 143 MEQ/L (ref 135–145)
SODIUM SERPL-SCNC: 144 MEQ/L (ref 135–145)
TROPONIN, HIGH SENSITIVITY: 116 NG/L (ref 0–12)
TROPONIN, HIGH SENSITIVITY: 118 NG/L (ref 0–12)
WBC # BLD AUTO: 10.9 THOU/MM3 (ref 4.8–10.8)
WBC # BLD AUTO: 8.1 THOU/MM3 (ref 4.8–10.8)

## 2024-03-08 PROCEDURE — 6370000000 HC RX 637 (ALT 250 FOR IP): Performed by: PHARMACIST

## 2024-03-08 PROCEDURE — 82248 BILIRUBIN DIRECT: CPT

## 2024-03-08 PROCEDURE — 6370000000 HC RX 637 (ALT 250 FOR IP): Performed by: STUDENT IN AN ORGANIZED HEALTH CARE EDUCATION/TRAINING PROGRAM

## 2024-03-08 PROCEDURE — 6370000000 HC RX 637 (ALT 250 FOR IP)

## 2024-03-08 PROCEDURE — 99223 1ST HOSP IP/OBS HIGH 75: CPT | Performed by: NUCLEAR MEDICINE

## 2024-03-08 PROCEDURE — 2500000003 HC RX 250 WO HCPCS: Performed by: STUDENT IN AN ORGANIZED HEALTH CARE EDUCATION/TRAINING PROGRAM

## 2024-03-08 PROCEDURE — 36415 COLL VENOUS BLD VENIPUNCTURE: CPT

## 2024-03-08 PROCEDURE — 82803 BLOOD GASES ANY COMBINATION: CPT

## 2024-03-08 PROCEDURE — 80053 COMPREHEN METABOLIC PANEL: CPT

## 2024-03-08 PROCEDURE — 93010 ELECTROCARDIOGRAM REPORT: CPT | Performed by: NUCLEAR MEDICINE

## 2024-03-08 PROCEDURE — 71045 X-RAY EXAM CHEST 1 VIEW: CPT

## 2024-03-08 PROCEDURE — 84484 ASSAY OF TROPONIN QUANT: CPT

## 2024-03-08 PROCEDURE — 84100 ASSAY OF PHOSPHORUS: CPT

## 2024-03-08 PROCEDURE — 93307 TTE W/O DOPPLER COMPLETE: CPT | Performed by: NUCLEAR MEDICINE

## 2024-03-08 PROCEDURE — 2580000003 HC RX 258: Performed by: STUDENT IN AN ORGANIZED HEALTH CARE EDUCATION/TRAINING PROGRAM

## 2024-03-08 PROCEDURE — 82330 ASSAY OF CALCIUM: CPT

## 2024-03-08 PROCEDURE — 6360000002 HC RX W HCPCS

## 2024-03-08 PROCEDURE — 6360000002 HC RX W HCPCS: Performed by: STUDENT IN AN ORGANIZED HEALTH CARE EDUCATION/TRAINING PROGRAM

## 2024-03-08 PROCEDURE — 2700000000 HC OXYGEN THERAPY PER DAY

## 2024-03-08 PROCEDURE — 96367 TX/PROPH/DG ADDL SEQ IV INF: CPT

## 2024-03-08 PROCEDURE — 93005 ELECTROCARDIOGRAM TRACING: CPT | Performed by: STUDENT IN AN ORGANIZED HEALTH CARE EDUCATION/TRAINING PROGRAM

## 2024-03-08 PROCEDURE — 96376 TX/PRO/DX INJ SAME DRUG ADON: CPT

## 2024-03-08 PROCEDURE — 85520 HEPARIN ASSAY: CPT

## 2024-03-08 PROCEDURE — 99222 1ST HOSP IP/OBS MODERATE 55: CPT | Performed by: INTERNAL MEDICINE

## 2024-03-08 PROCEDURE — 94660 CPAP INITIATION&MGMT: CPT

## 2024-03-08 PROCEDURE — A4216 STERILE WATER/SALINE, 10 ML: HCPCS | Performed by: STUDENT IN AN ORGANIZED HEALTH CARE EDUCATION/TRAINING PROGRAM

## 2024-03-08 PROCEDURE — 2140000000 HC CCU INTERMEDIATE R&B

## 2024-03-08 PROCEDURE — 2580000003 HC RX 258

## 2024-03-08 PROCEDURE — 5A09357 ASSISTANCE WITH RESPIRATORY VENTILATION, LESS THAN 24 CONSECUTIVE HOURS, CONTINUOUS POSITIVE AIRWAY PRESSURE: ICD-10-PCS | Performed by: STUDENT IN AN ORGANIZED HEALTH CARE EDUCATION/TRAINING PROGRAM

## 2024-03-08 PROCEDURE — 85027 COMPLETE CBC AUTOMATED: CPT

## 2024-03-08 PROCEDURE — 82948 REAGENT STRIP/BLOOD GLUCOSE: CPT

## 2024-03-08 PROCEDURE — C8923 2D TTE W OR W/O FOL W/CON,CO: HCPCS

## 2024-03-08 PROCEDURE — 6360000004 HC RX CONTRAST MEDICATION: Performed by: STUDENT IN AN ORGANIZED HEALTH CARE EDUCATION/TRAINING PROGRAM

## 2024-03-08 PROCEDURE — 85610 PROTHROMBIN TIME: CPT

## 2024-03-08 PROCEDURE — 96375 TX/PRO/DX INJ NEW DRUG ADDON: CPT

## 2024-03-08 PROCEDURE — 2500000003 HC RX 250 WO HCPCS

## 2024-03-08 PROCEDURE — 83735 ASSAY OF MAGNESIUM: CPT

## 2024-03-08 PROCEDURE — 94640 AIRWAY INHALATION TREATMENT: CPT

## 2024-03-08 PROCEDURE — 94761 N-INVAS EAR/PLS OXIMETRY MLT: CPT

## 2024-03-08 PROCEDURE — 99233 SBSQ HOSP IP/OBS HIGH 50: CPT | Performed by: STUDENT IN AN ORGANIZED HEALTH CARE EDUCATION/TRAINING PROGRAM

## 2024-03-08 PROCEDURE — 36600 WITHDRAWAL OF ARTERIAL BLOOD: CPT

## 2024-03-08 PROCEDURE — 85730 THROMBOPLASTIN TIME PARTIAL: CPT

## 2024-03-08 PROCEDURE — 85025 COMPLETE CBC W/AUTO DIFF WBC: CPT

## 2024-03-08 PROCEDURE — 96366 THER/PROPH/DIAG IV INF ADDON: CPT

## 2024-03-08 PROCEDURE — 93005 ELECTROCARDIOGRAM TRACING: CPT

## 2024-03-08 RX ORDER — BUMETANIDE 0.25 MG/ML
2 INJECTION INTRAMUSCULAR; INTRAVENOUS 2 TIMES DAILY
Status: DISCONTINUED | OUTPATIENT
Start: 2024-03-08 | End: 2024-03-09

## 2024-03-08 RX ORDER — METOPROLOL TARTRATE 1 MG/ML
5 INJECTION, SOLUTION INTRAVENOUS ONCE
Status: COMPLETED | OUTPATIENT
Start: 2024-03-08 | End: 2024-03-08

## 2024-03-08 RX ORDER — BUMETANIDE 0.25 MG/ML
2 INJECTION INTRAMUSCULAR; INTRAVENOUS ONCE
Status: COMPLETED | OUTPATIENT
Start: 2024-03-08 | End: 2024-03-08

## 2024-03-08 RX ORDER — ATORVASTATIN CALCIUM 80 MG/1
80 TABLET, FILM COATED ORAL NIGHTLY
Status: DISCONTINUED | OUTPATIENT
Start: 2024-03-08 | End: 2024-03-11 | Stop reason: HOSPADM

## 2024-03-08 RX ORDER — WARFARIN SODIUM 5 MG/1
5 TABLET ORAL
Status: COMPLETED | OUTPATIENT
Start: 2024-03-08 | End: 2024-03-08

## 2024-03-08 RX ORDER — IPRATROPIUM BROMIDE AND ALBUTEROL SULFATE 2.5; .5 MG/3ML; MG/3ML
1 SOLUTION RESPIRATORY (INHALATION)
Status: DISCONTINUED | OUTPATIENT
Start: 2024-03-08 | End: 2024-03-10

## 2024-03-08 RX ORDER — INSULIN LISPRO 100 [IU]/ML
0-4 INJECTION, SOLUTION INTRAVENOUS; SUBCUTANEOUS
Status: DISCONTINUED | OUTPATIENT
Start: 2024-03-08 | End: 2024-03-10

## 2024-03-08 RX ORDER — INSULIN LISPRO 100 [IU]/ML
0-4 INJECTION, SOLUTION INTRAVENOUS; SUBCUTANEOUS NIGHTLY
Status: DISCONTINUED | OUTPATIENT
Start: 2024-03-08 | End: 2024-03-10

## 2024-03-08 RX ORDER — HEPARIN SODIUM 1000 [USP'U]/ML
60 INJECTION, SOLUTION INTRAVENOUS; SUBCUTANEOUS PRN
Status: DISCONTINUED | OUTPATIENT
Start: 2024-03-08 | End: 2024-03-08

## 2024-03-08 RX ORDER — HEPARIN SODIUM 1000 [USP'U]/ML
30 INJECTION, SOLUTION INTRAVENOUS; SUBCUTANEOUS PRN
Status: DISCONTINUED | OUTPATIENT
Start: 2024-03-08 | End: 2024-03-08

## 2024-03-08 RX ORDER — HEPARIN SODIUM 10000 [USP'U]/100ML
5-30 INJECTION, SOLUTION INTRAVENOUS CONTINUOUS
Status: DISCONTINUED | OUTPATIENT
Start: 2024-03-08 | End: 2024-03-08

## 2024-03-08 RX ORDER — HEPARIN SODIUM 1000 [USP'U]/ML
60 INJECTION, SOLUTION INTRAVENOUS; SUBCUTANEOUS ONCE
Status: DISCONTINUED | OUTPATIENT
Start: 2024-03-08 | End: 2024-03-08

## 2024-03-08 RX ORDER — AMIODARONE HYDROCHLORIDE 200 MG/1
200 TABLET ORAL DAILY
Status: DISCONTINUED | OUTPATIENT
Start: 2024-03-09 | End: 2024-03-11 | Stop reason: HOSPADM

## 2024-03-08 RX ADMIN — MEXILETINE HYDROCHLORIDE 300 MG: 150 CAPSULE ORAL at 12:50

## 2024-03-08 RX ADMIN — Medication 100 MG: at 12:50

## 2024-03-08 RX ADMIN — SODIUM BICARBONATE: 84 INJECTION, SOLUTION INTRAVENOUS at 14:39

## 2024-03-08 RX ADMIN — MEXILETINE HYDROCHLORIDE 300 MG: 150 CAPSULE ORAL at 23:59

## 2024-03-08 RX ADMIN — SODIUM CHLORIDE, PRESERVATIVE FREE 10 ML: 5 INJECTION INTRAVENOUS at 19:57

## 2024-03-08 RX ADMIN — AMIODARONE HYDROCHLORIDE 150 MG: 1.5 INJECTION, SOLUTION INTRAVENOUS at 05:26

## 2024-03-08 RX ADMIN — AMIODARONE HYDROCHLORIDE 150 MG: 1.5 INJECTION, SOLUTION INTRAVENOUS at 00:56

## 2024-03-08 RX ADMIN — BUMETANIDE 2 MG: 0.25 INJECTION, SOLUTION INTRAMUSCULAR; INTRAVENOUS at 12:43

## 2024-03-08 RX ADMIN — HYDRALAZINE HYDROCHLORIDE 100 MG: 50 TABLET ORAL at 20:04

## 2024-03-08 RX ADMIN — PERFLUTREN 1 ML: 6.52 INJECTION, SUSPENSION INTRAVENOUS at 09:58

## 2024-03-08 RX ADMIN — WARFARIN SODIUM 5 MG: 5 TABLET ORAL at 21:30

## 2024-03-08 RX ADMIN — ALPRAZOLAM 0.5 MG: 0.5 TABLET ORAL at 19:56

## 2024-03-08 RX ADMIN — BUPROPION HYDROCHLORIDE 150 MG: 150 TABLET, EXTENDED RELEASE ORAL at 12:50

## 2024-03-08 RX ADMIN — BUMETANIDE 2 MG: 0.25 INJECTION INTRAMUSCULAR; INTRAVENOUS at 19:56

## 2024-03-08 RX ADMIN — TICAGRELOR 90 MG: 90 TABLET ORAL at 19:56

## 2024-03-08 RX ADMIN — AMIODARONE HYDROCHLORIDE 1 MG/MIN: 1.8 INJECTION, SOLUTION INTRAVENOUS at 01:09

## 2024-03-08 RX ADMIN — Medication 1 TABLET: at 12:50

## 2024-03-08 RX ADMIN — TICAGRELOR 90 MG: 90 TABLET ORAL at 12:51

## 2024-03-08 RX ADMIN — IPRATROPIUM BROMIDE AND ALBUTEROL SULFATE 1 DOSE: .5; 3 SOLUTION RESPIRATORY (INHALATION) at 06:11

## 2024-03-08 RX ADMIN — METOPROLOL TARTRATE 5 MG: 5 INJECTION INTRAVENOUS at 06:40

## 2024-03-08 RX ADMIN — ATORVASTATIN CALCIUM 80 MG: 80 TABLET, FILM COATED ORAL at 20:04

## 2024-03-08 RX ADMIN — IPRATROPIUM BROMIDE AND ALBUTEROL SULFATE 1 DOSE: .5; 3 SOLUTION RESPIRATORY (INHALATION) at 17:06

## 2024-03-08 RX ADMIN — AMIODARONE HYDROCHLORIDE 1 MG/MIN: 1.8 INJECTION, SOLUTION INTRAVENOUS at 05:47

## 2024-03-08 RX ADMIN — METOPROLOL SUCCINATE 100 MG: 100 TABLET, EXTENDED RELEASE ORAL at 12:51

## 2024-03-08 RX ADMIN — HYDRALAZINE HYDROCHLORIDE 100 MG: 50 TABLET ORAL at 12:50

## 2024-03-08 RX ADMIN — MORPHINE SULFATE 2 MG: 2 INJECTION, SOLUTION INTRAMUSCULAR; INTRAVENOUS at 00:12

## 2024-03-08 ASSESSMENT — ENCOUNTER SYMPTOMS
ABDOMINAL DISTENTION: 1
SHORTNESS OF BREATH: 1
COUGH: 0
ABDOMINAL PAIN: 0
NAUSEA: 0

## 2024-03-08 ASSESSMENT — PAIN DESCRIPTION - PAIN TYPE: TYPE: CHRONIC PAIN

## 2024-03-08 ASSESSMENT — PAIN DESCRIPTION - FREQUENCY: FREQUENCY: CONTINUOUS

## 2024-03-08 ASSESSMENT — PAIN DESCRIPTION - DESCRIPTORS: DESCRIPTORS: ACHING

## 2024-03-08 ASSESSMENT — PAIN SCALES - GENERAL
PAINLEVEL_OUTOF10: 10
PAINLEVEL_OUTOF10: 10

## 2024-03-08 NOTE — CARE COORDINATION
Case Management Assessment  Initial Evaluation    Date/Time of Evaluation: 3/8/2024 10:49 AM  Assessment Completed by: Debra García RN    If patient is discharged prior to next notation, then this note serves as note for discharge by case management.    Patient Name: Garrett Duncan                   YOB: 1965  Diagnosis: Chest pain in adult [R07.9]  Congestive heart failure, unspecified HF chronicity, unspecified heart failure type (HCC) [I50.9]                   Date / Time: 3/7/2024  4:07 PM  Location: [address] [place] [address]     Patient Admission Status: Observation   Readmission Risk Low 0-14, Mod 15-19), High > 20: Readmission Risk Score: 29.5    Current PCP: Leonel Adorno APRN - CNP  PCP verified by CM? Yes    Chart Reviewed: Yes      History Provided by: Patient  Patient Orientation: Alert and Oriented    Patient Cognition: Alert    Hospitalization in the last 30 days (Readmission):  Yes    If yes, Readmission Assessment in CM Navigator will be completed.    Advance Directives:      Code Status: Full Code   Patient's Primary Decision Maker is: Named in Scanned ACP Document    Primary Decision Maker: Jesica Duncan (Jose) - Child - [phone]    Secondary Decision Maker: PerlaLou - Child - [phone]    Discharge Planning:    Patient lives with: Alone Type of Home: Apartment  Primary Care Giver: Self  Patient Support Systems include: Children, Family Members   Current Financial resources: Other (Comment) ([employer] commercial)  Current community resources: None  Current services prior to admission: Durable Medical Equipment            Current DME: Walker            Type of Home Care services:  None    ADLS  Prior functional level: Independent in ADLs/IADLs  Current functional level: Independent in ADLs/IADLs    Family can provide assistance at DC: Yes  Would you like Case Management to discuss the discharge plan with any other family members/significant others, and if so, who? No  Plans to Return to

## 2024-03-08 NOTE — ED NOTES
Patient spO2 decreasing to 78% on room air. Patient appears to be holding breath. This RN instructed patient to breath and patient states \"sometimes I hold my breath Im 58 years old and this is how I do things.\" Patient placed on 4 L NC at this time. Patient spO2 now 93%-95%.  
Pt ambulated to the bathroom with RN. Bedside report given.   
Pt given urinal.  
Pt given warm blankets.  
Pt having 2-4 runs of PVC then converting back to sinus rhythm while laying on side. RN encouraged pt to lay on back due to only having 1-2 PVC in row while laying on back.   
Pt states pain is better at 6.5/10. Pt respirations are even and unlabored.   
Pt states pain is better. Pt respirations are even and unlabored.   
Pt to the ED via self with family. Patient presents with complaints of SOB. Patient states that he has been sick for weeks and recently admitted to the hospital for his gallbladder. Patient expresses that he has been binge drinking and has not drank in a week. EKG done. Patient is alert and oriented x 4. Respirations are regular and labored intermittently. Patient provided blanket. Family at the bedside and call light within reach.  
Upon first contact with patient this RN receives bedside shift report from Santosh RIVER. Patient ambulated to bathroom and back to bed. VS stable. Telemetry in place. Call light in reach.   
LUMBAR N/A 01/16/2018    LUMBAR AND SACRAL LAMINECTOMY, REMOVAL OF INTRASPINAL TUMORS performed by Burke Garcia MD at [facility] OR    VASCULAR SURGERY      cabg harvest from legs       PAST MEDICAL HISTORY       Past Medical History:   Diagnosis Date    Acute systolic CHF (congestive heart failure) ([facility]) 07/16/2015    Alcohol abuse     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    CAD (coronary artery disease) 03/25/2014    s/p CABG in may 2014    Chronic kidney disease     Diabetes mellitus ([facility])     Drug abuse ([facility])     hx of cacaine abuse, stopped abusing drugs in 2012    GERD (gastroesophageal reflux disease)     History of implantable cardiac defibrillator (ICD)     Hypertension     Intradural extramedullary spinal tumor     Long term (current) use of anticoagulants 08/29/2019    Medtronic dual icd      Neuromuscular disorder ([facility])     Paroxysmal atrial fibrillation ([facility]) 07/22/2014    Severe obesity (BMI 35.0-39.9) with comorbidity ([facility]) 05/22/2023    V-tach ([facility])     s/p ablation in dec 2014           Electronically signed by Santosh Chapman RN on 3/7/2024 at 6:31 PM

## 2024-03-08 NOTE — CARE COORDINATION
03/08/24 1051   Readmission Assessment   Number of Days since last admission? 1-7 days   Previous Disposition Home Alone   Who is being Interviewed Patient   What was the patient's/caregiver's perception as to why they think they needed to return back to the hospital? Other (Comment)  (CP and SOB)   Did you visit your Primary Care Physician after you left the hospital, before you returned this time? Yes   Did you see a specialist, such as Cardiac, Pulmonary, Orthopedic Physician, etc. after you left the hospital? No   Who advised the patient to return to the hospital? Self-referral   Does the patient report anything that got in the way of taking their medications? No   In our efforts to provide the best possible care to you and others like you, can you think of anything that we could have done to help you after you left the hospital the first time, so that you might not have needed to return so soon? Other (Comment)  (\"sent me home with pain meds\")

## 2024-03-08 NOTE — SIGNIFICANT EVENT
Provider notified by both the nurse and respiratory therapy that patient is refusing to wear HFNC and refusing ABG's. Attempted to explain to patient what his diagnosis is and why it is important to wear it. Explained that if he does not wear the high-flow O2 that his potential of deterioration and possible intubation increases. Explained code status and patient wishes to remain a Full code. Patient complains of headache and chest pain and requesting Morphine or Dilaudid stating that is what he has been getting and that's what works for him. After much time and persuasion he did apply the HFNC.     Please do not hesitate to notify this provider with any further concerns.     Electronically signed by ISABELLA Calderon CNP on 3/8/2024 at 12:18 AM

## 2024-03-08 NOTE — PLAN OF CARE
Problem: Respiratory - Adult  Goal: Clear lung sounds  Description: Clear lung sounds  Outcome: Progressing    Continue tx's to improve breath sounds, increase aeration and decrease WOB.

## 2024-03-08 NOTE — FLOWSHEET NOTE
As per report from 8 ab nurse, patient was advised to wear a bipap however, patient has been refusing to wear one. On arrival, respiratory  therapist came in and hooked patient to a heated high flow. After few minutes, patient removed his heated high flow cannula and refused to put it back on to him. Explained to patient importance of higher form of oxygen but would like to talk to a physician or NP. Anika Thomson came down and talked to patient and explained to him. Patient finally agreed if he will get morphine for his pain.     12:30 am Patient sustained Vtach rhythm. Referred to ALDO Thomson NP. Orders for amiodarone drip and bolus were placed.     2:06 am Referred patient to cardio Vikki Jackson. Considering transfer to ICU but still up to attending.

## 2024-03-08 NOTE — SIGNIFICANT EVENT
Floor nurse notified this provider that patient in sustained ventricular tachycardia, patient asymptomatic and sleeping when entering room. STAT EKG ordered. EKG showing Afib with RVR, LBBB. Amiodarone bolus and gtt ordered. Interrogate pacer. Cardiology consult in AM. Continue to monitor on telemetry. Hemodynamically stable.     Electronically signed by ISABELLA Calderon CNP on 3/8/2024 at 1:42 AM

## 2024-03-08 NOTE — PROCEDURES
Pacer Interrogate completed at 0124.    A report will be sent to the floor as soon as it is available.    BRAND: [employer]  TYPE: Evera MRI XT  [id number] (ICD)  S/N: [id number]

## 2024-03-09 LAB
ANION GAP SERPL CALC-SCNC: 12 MEQ/L (ref 8–16)
BUN SERPL-MCNC: 29 MG/DL (ref 7–22)
CALCIUM SERPL-MCNC: 8.3 MG/DL (ref 8.5–10.5)
CHLORIDE SERPL-SCNC: 106 MEQ/L (ref 98–111)
CO2 SERPL-SCNC: 22 MEQ/L (ref 23–33)
CREAT SERPL-MCNC: 3.4 MG/DL (ref 0.4–1.2)
GFR SERPL CREATININE-BSD FRML MDRD: 20 ML/MIN/1.73M2
GLUCOSE BLD STRIP.AUTO-MCNC: 142 MG/DL (ref 70–108)
GLUCOSE BLD STRIP.AUTO-MCNC: 156 MG/DL (ref 70–108)
GLUCOSE BLD STRIP.AUTO-MCNC: 208 MG/DL (ref 70–108)
GLUCOSE BLD STRIP.AUTO-MCNC: 236 MG/DL (ref 70–108)
GLUCOSE SERPL-MCNC: 123 MG/DL (ref 70–108)
HEPARIN UNFRACTIONATED: < 0.04 U/ML (ref 0.3–0.7)
HEPARIN UNFRACTIONATED: < 0.04 U/ML (ref 0.3–0.7)
INR PPP: 2.09 (ref 0.85–1.13)
MAGNESIUM SERPL-MCNC: 2.5 MG/DL (ref 1.6–2.4)
PHOSPHATE SERPL-MCNC: 4.2 MG/DL (ref 2.4–4.7)
POTASSIUM SERPL-SCNC: 4.7 MEQ/L (ref 3.5–5.2)
SODIUM SERPL-SCNC: 140 MEQ/L (ref 135–145)
WBC # BLD AUTO: 8.7 THOU/MM3 (ref 4.8–10.8)

## 2024-03-09 PROCEDURE — 6370000000 HC RX 637 (ALT 250 FOR IP)

## 2024-03-09 PROCEDURE — 84100 ASSAY OF PHOSPHORUS: CPT

## 2024-03-09 PROCEDURE — 2580000003 HC RX 258

## 2024-03-09 PROCEDURE — 85048 AUTOMATED LEUKOCYTE COUNT: CPT

## 2024-03-09 PROCEDURE — 6370000000 HC RX 637 (ALT 250 FOR IP): Performed by: PHARMACIST

## 2024-03-09 PROCEDURE — 80048 BASIC METABOLIC PNL TOTAL CA: CPT

## 2024-03-09 PROCEDURE — 6360000002 HC RX W HCPCS: Performed by: STUDENT IN AN ORGANIZED HEALTH CARE EDUCATION/TRAINING PROGRAM

## 2024-03-09 PROCEDURE — 6370000000 HC RX 637 (ALT 250 FOR IP): Performed by: STUDENT IN AN ORGANIZED HEALTH CARE EDUCATION/TRAINING PROGRAM

## 2024-03-09 PROCEDURE — 99233 SBSQ HOSP IP/OBS HIGH 50: CPT | Performed by: STUDENT IN AN ORGANIZED HEALTH CARE EDUCATION/TRAINING PROGRAM

## 2024-03-09 PROCEDURE — 2140000000 HC CCU INTERMEDIATE R&B

## 2024-03-09 PROCEDURE — 6360000002 HC RX W HCPCS

## 2024-03-09 PROCEDURE — 85520 HEPARIN ASSAY: CPT

## 2024-03-09 PROCEDURE — 83735 ASSAY OF MAGNESIUM: CPT

## 2024-03-09 PROCEDURE — 85610 PROTHROMBIN TIME: CPT

## 2024-03-09 PROCEDURE — 2500000003 HC RX 250 WO HCPCS: Performed by: PHYSICIAN ASSISTANT

## 2024-03-09 PROCEDURE — 82948 REAGENT STRIP/BLOOD GLUCOSE: CPT

## 2024-03-09 PROCEDURE — 94640 AIRWAY INHALATION TREATMENT: CPT

## 2024-03-09 PROCEDURE — 99232 SBSQ HOSP IP/OBS MODERATE 35: CPT | Performed by: INTERNAL MEDICINE

## 2024-03-09 PROCEDURE — 36415 COLL VENOUS BLD VENIPUNCTURE: CPT

## 2024-03-09 PROCEDURE — 94761 N-INVAS EAR/PLS OXIMETRY MLT: CPT

## 2024-03-09 RX ORDER — BUMETANIDE 0.25 MG/ML
2 INJECTION INTRAMUSCULAR; INTRAVENOUS DAILY
Status: DISCONTINUED | OUTPATIENT
Start: 2024-03-10 | End: 2024-03-10

## 2024-03-09 RX ORDER — WARFARIN SODIUM 5 MG/1
5 TABLET ORAL ONCE
Status: COMPLETED | OUTPATIENT
Start: 2024-03-09 | End: 2024-03-09

## 2024-03-09 RX ADMIN — Medication 100 MG: at 09:20

## 2024-03-09 RX ADMIN — WARFARIN SODIUM 5 MG: 5 TABLET ORAL at 18:09

## 2024-03-09 RX ADMIN — METOPROLOL SUCCINATE 100 MG: 100 TABLET, EXTENDED RELEASE ORAL at 09:20

## 2024-03-09 RX ADMIN — BUPROPION HYDROCHLORIDE 150 MG: 150 TABLET, EXTENDED RELEASE ORAL at 09:21

## 2024-03-09 RX ADMIN — MICONAZOLE NITRATE: 2 POWDER TOPICAL at 20:25

## 2024-03-09 RX ADMIN — Medication 1 TABLET: at 09:20

## 2024-03-09 RX ADMIN — HYDRALAZINE HYDROCHLORIDE 100 MG: 50 TABLET ORAL at 15:29

## 2024-03-09 RX ADMIN — MEXILETINE HYDROCHLORIDE 300 MG: 150 CAPSULE ORAL at 15:28

## 2024-03-09 RX ADMIN — AMIODARONE HYDROCHLORIDE 200 MG: 200 TABLET ORAL at 09:21

## 2024-03-09 RX ADMIN — TICAGRELOR 90 MG: 90 TABLET ORAL at 09:20

## 2024-03-09 RX ADMIN — MICONAZOLE NITRATE: 2 POWDER TOPICAL at 09:21

## 2024-03-09 RX ADMIN — ONDANSETRON 4 MG: 2 INJECTION INTRAMUSCULAR; INTRAVENOUS at 13:06

## 2024-03-09 RX ADMIN — TICAGRELOR 90 MG: 90 TABLET ORAL at 20:25

## 2024-03-09 RX ADMIN — INSULIN GLARGINE 10 UNITS: 100 INJECTION, SOLUTION SUBCUTANEOUS at 20:25

## 2024-03-09 RX ADMIN — HYDRALAZINE HYDROCHLORIDE 100 MG: 50 TABLET ORAL at 20:24

## 2024-03-09 RX ADMIN — IPRATROPIUM BROMIDE AND ALBUTEROL SULFATE 1 DOSE: .5; 3 SOLUTION RESPIRATORY (INHALATION) at 08:20

## 2024-03-09 RX ADMIN — HYDRALAZINE HYDROCHLORIDE 100 MG: 50 TABLET ORAL at 09:20

## 2024-03-09 RX ADMIN — ATORVASTATIN CALCIUM 80 MG: 80 TABLET, FILM COATED ORAL at 20:25

## 2024-03-09 RX ADMIN — INSULIN LISPRO 1 UNITS: 100 INJECTION, SOLUTION INTRAVENOUS; SUBCUTANEOUS at 09:26

## 2024-03-09 RX ADMIN — BUMETANIDE 2 MG: 0.25 INJECTION INTRAMUSCULAR; INTRAVENOUS at 09:21

## 2024-03-09 RX ADMIN — SODIUM CHLORIDE, PRESERVATIVE FREE 10 ML: 5 INJECTION INTRAVENOUS at 09:21

## 2024-03-09 RX ADMIN — MEXILETINE HYDROCHLORIDE 300 MG: 150 CAPSULE ORAL at 09:21

## 2024-03-09 NOTE — CONSULTS
[facility]          [address]        [address]        [place], [place] [id number] [phone]           Inpatient Initial consult note         3/8/2024 2:22 PM      Patient Name:   Garrett Duncan  YOB: 1965  Primary Care Physician:  Leonel Adorno APRN - CNP   Admit Date:    3/7/2024  4:07 PM    Consultation requested by : Justina Manning MD    Reason for Consult : Nephrology following for CKD / SOB .    History of presenting illness :Garrett Duncan is a 58 y.o.   male with Past Medical History as stated below presented with a chief complaint of Shortness of Breath   on 3/7/2024 .    Patient presented with chief complaints of shortness of breath associated with some chest pain and abdominal pain.  Started a couple of days ago gradually getting worse however got significantly worse last night so he presented to the ED.  Patient was recently in the hospital for alcohol withdrawal and abdominal pain as well.  Patient states he has been intermittently taking his diuretic.  He was also seen by cardiology and he may need a cardiac catheterization however at this time diuresis.    Past History:  Past Medical History:   Diagnosis Date    Acute systolic CHF (congestive heart failure) ([facility]) 07/16/2015    Alcohol abuse     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    CAD (coronary artery disease) 03/25/2014    s/p CABG in may 2014    Chronic kidney disease     Diabetes mellitus ([facility])     Drug abuse ([facility])     hx of cacaine abuse, stopped abusing drugs in 2012    GERD (gastroesophageal reflux disease)     History of implantable cardiac defibrillator (ICD)     Hypertension     Intradural extramedullary spinal tumor     Long term (current) use of anticoagulants 08/29/2019    Medtronic dual icd      Neuromuscular disorder ([facility])     Paroxysmal atrial fibrillation ([facility]) 07/22/2014    Severe obesity (BMI 35.0-39.9) with comorbidity ([facility]) 05/22/2023    V-tach ([facility])     s/p 
team at length.  Thank you for allowing me to participate in care of this patient.          NOA BLUM MD      D:  03/08/2024 12:27:46     T:  03/08/2024 17:21:42     RENU/SUDHIR  Job #:  [id number]     Doc#:  [id number]

## 2024-03-09 NOTE — PLAN OF CARE
Problem: Respiratory - Adult  Goal: Clear lung sounds  Description: Clear lung sounds  Outcome: Progressing   Continue aerosols to help improve breath sounds.    Patient mutually agreed on goals.

## 2024-03-10 LAB
ANION GAP SERPL CALC-SCNC: 11 MEQ/L (ref 8–16)
BUN SERPL-MCNC: 31 MG/DL (ref 7–22)
CALCIUM SERPL-MCNC: 8.6 MG/DL (ref 8.5–10.5)
CHLORIDE SERPL-SCNC: 104 MEQ/L (ref 98–111)
CO2 SERPL-SCNC: 23 MEQ/L (ref 23–33)
CREAT SERPL-MCNC: 3.4 MG/DL (ref 0.4–1.2)
GFR SERPL CREATININE-BSD FRML MDRD: 20 ML/MIN/1.73M2
GLUCOSE BLD STRIP.AUTO-MCNC: 129 MG/DL (ref 70–108)
GLUCOSE BLD STRIP.AUTO-MCNC: 188 MG/DL (ref 70–108)
GLUCOSE BLD STRIP.AUTO-MCNC: 218 MG/DL (ref 70–108)
GLUCOSE BLD STRIP.AUTO-MCNC: 232 MG/DL (ref 70–108)
GLUCOSE SERPL-MCNC: 131 MG/DL (ref 70–108)
INR PPP: 2 (ref 0.85–1.13)
POTASSIUM SERPL-SCNC: 4.5 MEQ/L (ref 3.5–5.2)
SODIUM SERPL-SCNC: 138 MEQ/L (ref 135–145)

## 2024-03-10 PROCEDURE — 6370000000 HC RX 637 (ALT 250 FOR IP)

## 2024-03-10 PROCEDURE — 6360000002 HC RX W HCPCS: Performed by: INTERNAL MEDICINE

## 2024-03-10 PROCEDURE — 6370000000 HC RX 637 (ALT 250 FOR IP): Performed by: STUDENT IN AN ORGANIZED HEALTH CARE EDUCATION/TRAINING PROGRAM

## 2024-03-10 PROCEDURE — 85610 PROTHROMBIN TIME: CPT

## 2024-03-10 PROCEDURE — 80048 BASIC METABOLIC PNL TOTAL CA: CPT

## 2024-03-10 PROCEDURE — 2140000000 HC CCU INTERMEDIATE R&B

## 2024-03-10 PROCEDURE — 36415 COLL VENOUS BLD VENIPUNCTURE: CPT

## 2024-03-10 PROCEDURE — 2580000003 HC RX 258

## 2024-03-10 PROCEDURE — 6370000000 HC RX 637 (ALT 250 FOR IP): Performed by: PHARMACIST

## 2024-03-10 PROCEDURE — 82948 REAGENT STRIP/BLOOD GLUCOSE: CPT

## 2024-03-10 PROCEDURE — 99232 SBSQ HOSP IP/OBS MODERATE 35: CPT | Performed by: INTERNAL MEDICINE

## 2024-03-10 PROCEDURE — 99232 SBSQ HOSP IP/OBS MODERATE 35: CPT | Performed by: STUDENT IN AN ORGANIZED HEALTH CARE EDUCATION/TRAINING PROGRAM

## 2024-03-10 RX ORDER — INSULIN LISPRO 100 [IU]/ML
0-4 INJECTION, SOLUTION INTRAVENOUS; SUBCUTANEOUS NIGHTLY
Status: DISCONTINUED | OUTPATIENT
Start: 2024-03-10 | End: 2024-03-11 | Stop reason: HOSPADM

## 2024-03-10 RX ORDER — INSULIN LISPRO 100 [IU]/ML
0-16 INJECTION, SOLUTION INTRAVENOUS; SUBCUTANEOUS
Status: DISCONTINUED | OUTPATIENT
Start: 2024-03-10 | End: 2024-03-11 | Stop reason: HOSPADM

## 2024-03-10 RX ORDER — WARFARIN SODIUM 3 MG/1
6 TABLET ORAL ONCE
Status: COMPLETED | OUTPATIENT
Start: 2024-03-10 | End: 2024-03-10

## 2024-03-10 RX ADMIN — MEXILETINE HYDROCHLORIDE 300 MG: 150 CAPSULE ORAL at 00:09

## 2024-03-10 RX ADMIN — AMIODARONE HYDROCHLORIDE 200 MG: 200 TABLET ORAL at 08:20

## 2024-03-10 RX ADMIN — HYDRALAZINE HYDROCHLORIDE 100 MG: 50 TABLET ORAL at 13:54

## 2024-03-10 RX ADMIN — ATORVASTATIN CALCIUM 80 MG: 80 TABLET, FILM COATED ORAL at 20:25

## 2024-03-10 RX ADMIN — Medication 1 TABLET: at 08:27

## 2024-03-10 RX ADMIN — INSULIN GLARGINE 10 UNITS: 100 INJECTION, SOLUTION SUBCUTANEOUS at 20:26

## 2024-03-10 RX ADMIN — ALPRAZOLAM 0.5 MG: 0.5 TABLET ORAL at 22:41

## 2024-03-10 RX ADMIN — IPRATROPIUM BROMIDE AND ALBUTEROL SULFATE 1 DOSE: .5; 3 SOLUTION RESPIRATORY (INHALATION) at 09:45

## 2024-03-10 RX ADMIN — MEXILETINE HYDROCHLORIDE 300 MG: 150 CAPSULE ORAL at 16:32

## 2024-03-10 RX ADMIN — MEXILETINE HYDROCHLORIDE 300 MG: 150 CAPSULE ORAL at 23:27

## 2024-03-10 RX ADMIN — WARFARIN SODIUM 6 MG: 3 TABLET ORAL at 18:09

## 2024-03-10 RX ADMIN — INSULIN LISPRO 4 UNITS: 100 INJECTION, SOLUTION INTRAVENOUS; SUBCUTANEOUS at 18:09

## 2024-03-10 RX ADMIN — Medication 100 MG: at 08:19

## 2024-03-10 RX ADMIN — EMPAGLIFLOZIN 10 MG: 10 TABLET, FILM COATED ORAL at 12:35

## 2024-03-10 RX ADMIN — MEXILETINE HYDROCHLORIDE 300 MG: 150 CAPSULE ORAL at 08:18

## 2024-03-10 RX ADMIN — BUMETANIDE 2 MG: 0.25 INJECTION INTRAMUSCULAR; INTRAVENOUS at 08:20

## 2024-03-10 RX ADMIN — INSULIN LISPRO 1 UNITS: 100 INJECTION, SOLUTION INTRAVENOUS; SUBCUTANEOUS at 12:35

## 2024-03-10 RX ADMIN — TICAGRELOR 90 MG: 90 TABLET ORAL at 08:19

## 2024-03-10 RX ADMIN — HYDRALAZINE HYDROCHLORIDE 100 MG: 50 TABLET ORAL at 08:18

## 2024-03-10 RX ADMIN — TICAGRELOR 90 MG: 90 TABLET ORAL at 20:26

## 2024-03-10 RX ADMIN — BUPROPION HYDROCHLORIDE 150 MG: 150 TABLET, EXTENDED RELEASE ORAL at 08:19

## 2024-03-10 RX ADMIN — SODIUM CHLORIDE, PRESERVATIVE FREE 10 ML: 5 INJECTION INTRAVENOUS at 08:26

## 2024-03-10 RX ADMIN — HYDRALAZINE HYDROCHLORIDE 100 MG: 50 TABLET ORAL at 20:25

## 2024-03-10 NOTE — RT PROTOCOL NOTE
RT Inhaler-Nebulizer Bronchodilator Protocol Note    There is a bronchodilator order in the chart from a provider indicating to follow the RT Bronchodilator Protocol and there is an “Initiate RT Inhaler-Nebulizer Bronchodilator Protocol” order as well (see protocol at bottom of note).    CXR Findings:  No results found.    The findings from the last RT Protocol Assessment were as follows:   History Pulmonary Disease: Smoker 15 pack years or more  Respiratory Pattern: Regular pattern and RR 12-20 bpm  Breath Sounds: Slightly diminished and/or crackles  Cough: Strong, spontaneous, non-productive  Indication for Bronchodilator Therapy: Decreased or absent breath sounds  Bronchodilator Assessment Score: 3    Aerosolized bronchodilator medication orders have been revised according to the RT Inhaler-Nebulizer Bronchodilator Protocol below.    Respiratory Therapist to perform RT Therapy Protocol Assessment initially then follow the protocol.  Repeat RT Therapy Protocol Assessment PRN for score 0-3 or on second treatment, BID, and PRN for scores above 3.    No Indications - adjust the frequency to every 6 hours PRN wheezing or bronchospasm, if no treatments needed after 48 hours then discontinue using Per Protocol order mode.     If indication present, adjust the RT bronchodilator orders based on the Bronchodilator Assessment Score as indicated below.  Use Inhaler orders unless patient has one or more of the following: on home nebulizer, not able to hold breath for 10 seconds, is not alert and oriented, cannot activate and use MDI correctly, or respiratory rate 25 breaths per minute or more, then use the equivalent nebulizer order(s) with same Frequency and PRN reasons based on the score.  If a patient is on this medication at home then do not decrease Frequency below that used at home.    0-3 - enter or revise RT bronchodilator order(s) to equivalent RT Bronchodilator order with Frequency of every 4 hours PRN for wheezing 
RT Inhaler-Nebulizer Bronchodilator Protocol Note    There is a bronchodilator order in the chart from a provider indicating to follow the RT Bronchodilator Protocol and there is an “Initiate RT Inhaler-Nebulizer Bronchodilator Protocol” order as well (see protocol at bottom of note).    CXR Findings:  XR CHEST PORTABLE    Result Date: 3/7/2024  There may be a mild degree of bilateral suprahilar infiltrate or edema. This document has been electronically signed by: Lucretia Lynch MD on 03/07/2024 05:20 PM      The findings from the last RT Protocol Assessment were as follows:   History Pulmonary Disease: Smoker 15 pack years or more  Respiratory Pattern: Dyspnea on exertion or RR 21-25 bpm  Breath Sounds: Slightly diminished and/or crackles  Cough: Strong, spontaneous, non-productive  Indication for Bronchodilator Therapy: Decreased or absent breath sounds  Bronchodilator Assessment Score: 5    Aerosolized bronchodilator medication orders have been revised according to the RT Inhaler-Nebulizer Bronchodilator Protocol below.    Respiratory Therapist to perform RT Therapy Protocol Assessment initially then follow the protocol.  Repeat RT Therapy Protocol Assessment PRN for score 0-3 or on second treatment, BID, and PRN for scores above 3.    No Indications - adjust the frequency to every 6 hours PRN wheezing or bronchospasm, if no treatments needed after 48 hours then discontinue using Per Protocol order mode.     If indication present, adjust the RT bronchodilator orders based on the Bronchodilator Assessment Score as indicated below.  Use Inhaler orders unless patient has one or more of the following: on home nebulizer, not able to hold breath for 10 seconds, is not alert and oriented, cannot activate and use MDI correctly, or respiratory rate 25 breaths per minute or more, then use the equivalent nebulizer order(s) with same Frequency and PRN reasons based on the score.  If a patient is on this medication at home 
medication at home then do not decrease Frequency below that used at home.    0-3 - enter or revise RT bronchodilator order(s) to equivalent RT Bronchodilator order with Frequency of every 4 hours PRN for wheezing or increased work of breathing using Per Protocol order mode.        4-6 - enter or revise RT Bronchodilator order(s) to two equivalent RT bronchodilator orders with one order with BID Frequency and one order with Frequency of every 4 hours PRN wheezing or increased work of breathing using Per Protocol order mode.        7-10 - enter or revise RT Bronchodilator order(s) to two equivalent RT bronchodilator orders with one order with TID Frequency and one order with Frequency of every 4 hours PRN wheezing or increased work of breathing using Per Protocol order mode.       11-13 - enter or revise RT Bronchodilator order(s) to one equivalent RT bronchodilator order with QID Frequency and an Albuterol order with Frequency of every 4 hours PRN wheezing or increased work of breathing using Per Protocol order mode.      Greater than 13 - enter or revise RT Bronchodilator order(s) to one equivalent RT bronchodilator order with every 4 hours Frequency and an Albuterol order with Frequency of every 2 hours PRN wheezing or increased work of breathing using Per Protocol order mode.     RT to enter RT Home Evaluation for COPD & MDI Assessment order using Per Protocol order mode.    Electronically signed by Nisa Diaz RCP on 3/7/2024 at 11:01 PM

## 2024-03-11 ENCOUNTER — TELEPHONE (OUTPATIENT)
Dept: CARDIOLOGY CLINIC | Age: 59
End: 2024-03-11

## 2024-03-11 ENCOUNTER — TELEPHONE (OUTPATIENT)
Dept: FAMILY MEDICINE CLINIC | Age: 59
End: 2024-03-11

## 2024-03-11 VITALS
DIASTOLIC BLOOD PRESSURE: 78 MMHG | TEMPERATURE: 98.9 F | HEART RATE: 64 BPM | OXYGEN SATURATION: 93 % | HEIGHT: 74 IN | BODY MASS INDEX: 35.71 KG/M2 | SYSTOLIC BLOOD PRESSURE: 152 MMHG | WEIGHT: 278.22 LBS | RESPIRATION RATE: 18 BRPM

## 2024-03-11 LAB
ANION GAP SERPL CALC-SCNC: 13 MEQ/L (ref 8–16)
BUN SERPL-MCNC: 29 MG/DL (ref 7–22)
CA-I BLD ISE-SCNC: 1.18 MMOL/L (ref 1.12–1.32)
CALCIUM SERPL-MCNC: 8.7 MG/DL (ref 8.5–10.5)
CHLORIDE SERPL-SCNC: 104 MEQ/L (ref 98–111)
CHOLEST SERPL-MCNC: 115 MG/DL (ref 100–199)
CO2 SERPL-SCNC: 22 MEQ/L (ref 23–33)
CREAT SERPL-MCNC: 3.4 MG/DL (ref 0.4–1.2)
DEPRECATED RDW RBC AUTO: 60.2 FL (ref 35–45)
ERYTHROCYTE [DISTWIDTH] IN BLOOD BY AUTOMATED COUNT: 16.9 % (ref 11.5–14.5)
GFR SERPL CREATININE-BSD FRML MDRD: 20 ML/MIN/1.73M2
GLUCOSE BLD STRIP.AUTO-MCNC: 148 MG/DL (ref 70–108)
GLUCOSE BLD STRIP.AUTO-MCNC: 163 MG/DL (ref 70–108)
GLUCOSE BLD STRIP.AUTO-MCNC: 176 MG/DL (ref 70–108)
GLUCOSE SERPL-MCNC: 109 MG/DL (ref 70–108)
HCT VFR BLD AUTO: 30.2 % (ref 42–52)
HDLC SERPL-MCNC: 39 MG/DL
HGB BLD-MCNC: 9 GM/DL (ref 14–18)
INR PPP: 2.87 (ref 0.85–1.13)
LDLC SERPL CALC-MCNC: 55 MG/DL
MAGNESIUM SERPL-MCNC: 2.4 MG/DL (ref 1.6–2.4)
MCH RBC QN AUTO: 28.8 PG (ref 26–33)
MCHC RBC AUTO-ENTMCNC: 29.8 GM/DL (ref 32.2–35.5)
MCV RBC AUTO: 96.8 FL (ref 80–94)
PHOSPHATE SERPL-MCNC: 3.5 MG/DL (ref 2.4–4.7)
PLATELET # BLD AUTO: 172 THOU/MM3 (ref 130–400)
PMV BLD AUTO: 10.7 FL (ref 9.4–12.4)
POTASSIUM SERPL-SCNC: 4.1 MEQ/L (ref 3.5–5.2)
RBC # BLD AUTO: 3.12 MILL/MM3 (ref 4.7–6.1)
SODIUM SERPL-SCNC: 139 MEQ/L (ref 135–145)
TRIGL SERPL-MCNC: 103 MG/DL (ref 0–199)
WBC # BLD AUTO: 5.9 THOU/MM3 (ref 4.8–10.8)

## 2024-03-11 PROCEDURE — 80048 BASIC METABOLIC PNL TOTAL CA: CPT

## 2024-03-11 PROCEDURE — 99232 SBSQ HOSP IP/OBS MODERATE 35: CPT | Performed by: NURSE PRACTITIONER

## 2024-03-11 PROCEDURE — 2580000003 HC RX 258

## 2024-03-11 PROCEDURE — 84100 ASSAY OF PHOSPHORUS: CPT

## 2024-03-11 PROCEDURE — 6370000000 HC RX 637 (ALT 250 FOR IP)

## 2024-03-11 PROCEDURE — 36415 COLL VENOUS BLD VENIPUNCTURE: CPT

## 2024-03-11 PROCEDURE — 85027 COMPLETE CBC AUTOMATED: CPT

## 2024-03-11 PROCEDURE — 85610 PROTHROMBIN TIME: CPT

## 2024-03-11 PROCEDURE — 99232 SBSQ HOSP IP/OBS MODERATE 35: CPT | Performed by: INTERNAL MEDICINE

## 2024-03-11 PROCEDURE — 83735 ASSAY OF MAGNESIUM: CPT

## 2024-03-11 PROCEDURE — 82948 REAGENT STRIP/BLOOD GLUCOSE: CPT

## 2024-03-11 PROCEDURE — 6370000000 HC RX 637 (ALT 250 FOR IP): Performed by: STUDENT IN AN ORGANIZED HEALTH CARE EDUCATION/TRAINING PROGRAM

## 2024-03-11 PROCEDURE — 82330 ASSAY OF CALCIUM: CPT

## 2024-03-11 PROCEDURE — 80061 LIPID PANEL: CPT

## 2024-03-11 RX ORDER — ALPRAZOLAM 0.5 MG/1
0.5 TABLET ORAL ONCE
Status: COMPLETED | OUTPATIENT
Start: 2024-03-11 | End: 2024-03-11

## 2024-03-11 RX ORDER — WARFARIN SODIUM 4 MG/1
4 TABLET ORAL ONCE
Status: COMPLETED | OUTPATIENT
Start: 2024-03-11 | End: 2024-03-11

## 2024-03-11 RX ORDER — FOLIC ACID 1 MG/1
1 TABLET ORAL DAILY
Qty: 30 TABLET | Refills: 0 | Status: SHIPPED | OUTPATIENT
Start: 2024-03-11 | End: 2024-04-10

## 2024-03-11 RX ORDER — METOPROLOL SUCCINATE 100 MG/1
100 TABLET, EXTENDED RELEASE ORAL 2 TIMES DAILY
Status: DISCONTINUED | OUTPATIENT
Start: 2024-03-11 | End: 2024-03-11 | Stop reason: HOSPADM

## 2024-03-11 RX ORDER — HYDROXYZINE PAMOATE 25 MG/1
25 CAPSULE ORAL 3 TIMES DAILY PRN
Status: DISCONTINUED | OUTPATIENT
Start: 2024-03-11 | End: 2024-03-11

## 2024-03-11 RX ADMIN — BUMETANIDE 2 MG: 1 TABLET ORAL at 09:22

## 2024-03-11 RX ADMIN — BUPROPION HYDROCHLORIDE 150 MG: 150 TABLET, EXTENDED RELEASE ORAL at 09:22

## 2024-03-11 RX ADMIN — Medication 100 MG: at 09:21

## 2024-03-11 RX ADMIN — SODIUM CHLORIDE, PRESERVATIVE FREE 10 ML: 5 INJECTION INTRAVENOUS at 09:21

## 2024-03-11 RX ADMIN — AMIODARONE HYDROCHLORIDE 200 MG: 200 TABLET ORAL at 09:22

## 2024-03-11 RX ADMIN — TICAGRELOR 90 MG: 90 TABLET ORAL at 09:22

## 2024-03-11 RX ADMIN — MEXILETINE HYDROCHLORIDE 300 MG: 150 CAPSULE ORAL at 09:21

## 2024-03-11 RX ADMIN — EMPAGLIFLOZIN 10 MG: 10 TABLET, FILM COATED ORAL at 09:22

## 2024-03-11 RX ADMIN — MICONAZOLE NITRATE: 2 POWDER TOPICAL at 09:21

## 2024-03-11 RX ADMIN — ALPRAZOLAM 0.5 MG: 0.5 TABLET ORAL at 05:03

## 2024-03-11 RX ADMIN — METOPROLOL SUCCINATE 100 MG: 100 TABLET, EXTENDED RELEASE ORAL at 09:22

## 2024-03-11 RX ADMIN — Medication 1 TABLET: at 09:22

## 2024-03-11 RX ADMIN — HYDRALAZINE HYDROCHLORIDE 100 MG: 50 TABLET ORAL at 09:21

## 2024-03-11 RX ADMIN — HYDRALAZINE HYDROCHLORIDE 100 MG: 50 TABLET ORAL at 15:09

## 2024-03-11 RX ADMIN — WARFARIN SODIUM 4 MG: 4 TABLET ORAL at 15:41

## 2024-03-11 RX ADMIN — MEXILETINE HYDROCHLORIDE 300 MG: 150 CAPSULE ORAL at 15:42

## 2024-03-11 NOTE — TELEPHONE ENCOUNTER
[facility] calling in to schedule this patient for a follow up in approx 4 weeks.  Please advise when he can be seen.

## 2024-03-11 NOTE — DISCHARGE INSTRUCTIONS
Follow with Ke et al... 4-6 weeks   CHF / Cardiorenal disease  / NSVT / ICD     on alcohol and smoking cessation. Will need to follow up with PCP for anxiety management.

## 2024-03-11 NOTE — DISCHARGE SUMMARY
Brilinta  Take 1 tablet by mouth 2 times daily            ASK your doctor about these medications      bumetanide 2 MG tablet  Commonly known as: BUMEX  TAKE 1 TABLET BY MOUTH IN THE MORNING               Where to Get Your Medications        These medications were sent to [facility] - [place], [place] - [address] - P [phone] - F [phone]  [address], [facility] [address]      Phone: [phone]   folic acid 1 MG tablet          Time Spent on discharge is more than 1 hour in the examination, evaluation, counseling and review of medications and discharge plan.      Signed:    Electronically signed by Justina Manning MD on 3/11/2024 at 2:23 PM

## 2024-03-11 NOTE — TELEPHONE ENCOUNTER
The patient called in stating that he was still in the hospital but had spoke with [facility] about his disability and was told that they had faxed the new forms to the office and that they need returned back to them by Thursday.      He is asking if you received the forms and he also stated that he received forms at home and needs to know his DX for this admission.  Please advise.      The patient is stating that both you and Dr. Stroud must sign the disability forms.

## 2024-03-11 NOTE — PROGRESS NOTES
Clinical Pharmacy Note                                               Warfarin Discharge Recommendations      Patient discharged from [facility] today on warfarin.    Warfarin indication: Atrial fibrillation or Atrial flutter  INR goal during admission: 2.0-3.0  Previous home warfarin regimen: 2.5mg F, 5mg all other days  Interacting medications at discharge: ticagrelor, amiodarone  Coumadin 5 mg tabs    Hospital Warfarin Doses & INR Results  Concurrent anticoagulants/antiplatelets: Brilinta  Significant warfarin drug-drug interactions: amiodarone PTA     Date INR Warfarin Dose   3/7/24 none 5 mg (PTA)    3/8/24 2.05  5 mg     3/9/24 2.09  5 mg    3/10/24   2.00 6 mg    3/11/24  2.87  4 mg                      Recent INRs:  Recent Labs     03/11/24  0427   INR 2.87*     Warfarin Discharge Instructions:   Date Warfarin Dose   3/12/24 (tomorrow) 5 mg (1 tablet)   3/13/24 5 mg (1 tablet)   3/14/24 INR check at clinic     Provider dosing warfarin: [facility]  Next INR date: Thursday 3/14/24 @ 3PM      
                                                   Hospitalist Progress Note      Patient:  Garrett Duncan    Unit/Bed:3B-29/029-A  YOB: 1965  MRN: [id number]   Acct: [id number]   PCP: Leonel Adorno APRN - CNP  Date of Admission: 3/7/2024    Assessment/Plan:  Middle-aged male with complex cardiac history, noncompliant, presented for shortness of breath.  Admitted for CHF exacerbation which is improving.  Undergoing IV diuresis.  Hemodynamically stable. Cardiology and nephrology following.       Acute on chronic ischemic systolic heart failure: NYHA class III.  S/p AICD. Last TTE 11/29/2023 with EF 30-35% and severely dilated LV, severe global hypokinesis, abnormal diastolic function and mildly dilated LA. repeat limited echo on 1/2/2024 with unchanged findings.  Repeat TTE 3/8/24 with EF 30-35%, mildly dilated LV, severe global hypokinesis, mildly dilated LA.  Of note, patient is noncompliant with his medications. Discussed the importance of compliance with him. No indication for LHC at this time. Cardiology following.   GDMT:   [x]BB Toprol- mg daily  [x]ACE / ARB/ ARNI Per chart review, patient was previously on lisinopril 10 mg daily but was discontinued in 09/2019 in the setting of BABAK on CKD.   [x]Diuretics Bumex 2 mg daily  []MRA  [x]SGLT2i Farxiga 5 mg daily  Also on Imdur 120 mg daily.   Salt/ Fluid restriction/ Daily weights/ Strict I&O/ HF Clinic/ Dietitian/ IV diuresis with Bumex 2 mg IV BID- changed to daily. Transition to PO tomorrow. Cole drummond daily ordered.    Arrhythmia/ Hx PAF: noted to be in V tach overnight.Hx ablation 2014. S/p amiodarone. Resumed PO amiodarone now in NSR. Discussed with Dr. Dempsey. Monitor on Tele. On BB/Mexitil/Amiodarone/ Coumadin at home. INR therapeutic at 2 today. Pharmacy following.     Primary hypertension: Uncontrolled, acutely related to shortness of breath and irritable mood.  On hydralazine 100 mg 3 times daily in addition to medications mentioned 
   03/10/24 0951   RT Protocol   History Pulmonary Disease 1   Respiratory pattern 0   Breath sounds 2   Cough 0   Bronchodilator Assessment Score 3       
2004: Pt arrived on floor. Pt alert and oriented x4. Oriented to room and call light system and verbalizes understanding.    2015: during assessment of pt, pt starts falling asleep and needs to be woken up frequently.    2021:Hospitalist contacted and updated on pt status.    2023: Abi REESE at bedside to assess pt    2145: Pt requests snack. Pt able to remain awake to safely consume and swallowing without difficulty    2118: Hospitalist contacted d/t pt requesting pain medication for c/o epigastric and chest pain 6/10    2210: Report called to Jeferson on     2215: Pt medicated    2225: Pt transported off floor to  via hospital bed  Electronically signed by Anika Hopkins RN on 3/7/2024 at 11:34 PM    
Cardiology Progress Note      Patient:  Garrett Duncan  YOB: 1965  MRN: [id number]   Acct: [id number]  Admit Date:  3/7/2024  Primary Cardiologist: Fany Guzmán MD  Seen by dr. Dempsey    Per prior cardiology consult note-  REASON FOR CONSULTATION:  Congestive heart failure.     REQUESTING PHYSICIAN:  Hospitalist Service.     HISTORY OF PRESENT ILLNESS:  A 58-year-old gentleman with extensive medical problem history with history of coronary artery disease, bypass surgery, cardiomyopathy, biventricular ICD, atrial fibrillation, ventricular tachycardia, hypertension, hyperlipidemia, diabetes, obesity, and multiple other chronic issues including significant depression and alcohol dependence, had a recent cholecystitis episode, treated medically, comes in with worsening shortness of breath and what seems to be fluid overload, has had worsening shortness of breath and lower extremity edema with some chest discomfort.  He is known to have renal disease.  We were consulted to assist in management of the patient.    Subjective (Events in last 24 hours):   CHF - compensated - managed by Nephrology     Pt siting at bedside   No chest pain or SOB     Feet swelling noted   Teds in place     BP stable   Tele paced / SR  NSVT --> asymptomatic     Pt anxious / depressed - trouble sleeping - defer to attending ? Medication need     Objective:   BP (!) 141/99   Pulse 76   Temp 98.6 °F (37 °C) (Oral)   Resp 18   Ht 1.88 m (6' 2\")   Wt 126.2 kg (278 lb 3.5 oz)   SpO2 97%   BMI 35.72 kg/m²        TELEMETRY:  paced / SR  NSVT --> asymptomatic     Physical Exam:  General Appearance: alert and oriented to person, place and time, in no acute distress  Cardiovascular: normal rate, regular rhythm, normal S1 and S2, no murmurs, rubs, clicks, or gallops, distal pulses intact,   Pulmonary/Chest: clear to auscultation bilaterally- no wheezes, rales or rhonchi, normal air movement, no respiratory distress  Abdomen: soft, 
IV removed x2, telemetry removed and left in room for cleaning, discharge instructions reviewed with patient, patient with medication from outpatient pharmacy, patient to be transported by wheelchair to discharge, patient to drive himself home.   
IWONA hose applied.   
[facility]   Nephrology progress note  3/11/2024, 11:20 AM      Pt Name:    Garrett Duncan  MRN:     [id number]     YOB: 1965  Admit Date:    3/7/2024  4:07 PM    Chief Complaint: Nephrology following for BABAK/CKD/fluid overload.    Subjective:  Patient seen and examined  No chest pain shortness of breath much better  He is feeling much better     Objective:  24HR INTAKE/OUTPUT:    Intake/Output Summary (Last 24 hours) at 3/11/2024 1120  Last data filed at 3/11/2024 0957  Gross per 24 hour   Intake 760 ml   Output 3825 ml   Net -3065 ml        Admission weight: 125.2 kg (276 lb 0.3 oz)  Wt Readings from Last 3 Encounters:   03/09/24 126.2 kg (278 lb 3.5 oz)   03/07/24 125.4 kg (276 lb 8 oz)   02/29/24 124 kg (273 lb 5.9 oz)        Vitals :   Vitals:    03/10/24 2008 03/10/24 2327 03/11/24 0305 03/11/24 0914   BP: (!) 157/89 (!) 150/76 (!) 148/76 (!) 141/99   Pulse: 65 62 63 76   Resp: 18 18 18 18   Temp: 98 °F (36.7 °C) 97.8 °F (36.6 °C) 98 °F (36.7 °C) 98.6 °F (37 °C)   TempSrc: Oral Oral Oral Oral   SpO2: 99% 98% 99% 97%   Weight:       Height:           Physical examination  General Appearance:  Well developed. No distress  Mouth/Throat:  Oral mucosa moist  Neck:  Supple, no JVD  Lungs:  Breath sounds: clear  Heart::  S1,S2 heard  Abdomen:  Soft, non - tender  Musculoskeletal:  Edema -much improved    Medications:  Infusion:    dextrose      sodium chloride       Meds:    metoprolol succinate  100 mg Oral BID    insulin lispro  0-16 Units SubCUTAneous TID WC    insulin lispro  0-4 Units SubCUTAneous Nightly    miconazole   Topical BID    atorvastatin  80 mg Oral Nightly    amiodarone  200 mg Oral Daily    empagliflozin  10 mg Oral Daily    bumetanide  2 mg Oral Daily    buPROPion  150 mg Oral QAM    hydrALAZINE  100 mg Oral TID    insulin glargine  10 Units SubCUTAneous Nightly    mexiletine  300 mg Oral 3 times per day    multivitamin  1 tablet Oral Daily    thiamine  100 mg Oral 
[facility]   Nephrology progress note  3/9/2024, 10:59 AM      Pt Name:    Garrett Duncan  MRN:     [id number]     YOB: 1965  Admit Date:    3/7/2024  4:07 PM    Chief Complaint: Nephrology following for BABAK/CKD/fluid overload.    Subjective:  Patient seen and examined  No chest pain shortness of breath much better  Says he is feeling tired    Objective:  24HR INTAKE/OUTPUT:    Intake/Output Summary (Last 24 hours) at 3/9/2024 1059  Last data filed at 3/9/2024 0540  Gross per 24 hour   Intake 1380 ml   Output 2650 ml   Net -1270 ml      Admission weight: 125.2 kg (276 lb 0.3 oz)  Wt Readings from Last 3 Encounters:   03/09/24 126.2 kg (278 lb 3.5 oz)   03/07/24 125.4 kg (276 lb 8 oz)   02/29/24 124 kg (273 lb 5.9 oz)        Vitals :   Vitals:    03/09/24 0445 03/09/24 0658 03/09/24 0820 03/09/24 0913   BP:    (!) 143/122   Pulse:    74   Resp:    18   Temp:    98.6 °F (37 °C)   TempSrc:    Oral   SpO2: 98%  97% 97%   Weight:  126.2 kg (278 lb 3.5 oz)     Height:           Physical examination  General Appearance:  Well developed. No distress  Mouth/Throat:  Oral mucosa moist  Neck:  Supple, no JVD  Lungs:  Breath sounds: clear  Heart::  S1,S2 heard  Abdomen:  Soft, non - tender  Musculoskeletal:  Edema -much improved    Medications:  Infusion:    dextrose      sodium chloride       Meds:    miconazole   Topical BID    warfarin  5 mg Oral Once    ipratropium 0.5 mg-albuterol 2.5 mg  1 Dose Inhalation BID RT    atorvastatin  80 mg Oral Nightly    insulin lispro  0-4 Units SubCUTAneous TID WC    insulin lispro  0-4 Units SubCUTAneous Nightly    amiodarone  200 mg Oral Daily    bumetanide  2 mg IntraVENous BID    [Held by provider] empagliflozin  10 mg Oral Daily    [Held by provider] bumetanide  2 mg Oral Daily    buPROPion  150 mg Oral QAM    hydrALAZINE  100 mg Oral TID    [Held by provider] insulin glargine  10 Units SubCUTAneous Nightly    metoprolol succinate  100 mg Oral Daily    
Nutrition Education    Education:  Pt seen, filling out paperwork upon time of visit. Pt states that he does not like [facility] food and therefore his PO intake this admit has been less than usual. Pt states that he tries to follow a low sodium diet. When asked if pt would like to reviewed diet education for CHF and CKD - pt declines and stated that he does not want education in the hospital. Pt was accepting of handouts - writer encouraged pt to reach out if he has questions or concerns and recommending OP RD visits to assist with diet educational needs outside of the inpatient setting.    Assessment:  Pt endorses varied PO intake (0, 25-50, and 50-75% PO intakes documented) depending on tolerability of [facility] meal items - states that he really does not enjoy the food. Pt with recent [facility] admission (2/29/24-3/3/24). Hx/o polysubstance abuse and pancreatitis.   Cannot accurately assess weight changes r/t volume overload and fluid shifts.     Educated on CHF and CKD IV dietary recommendations.  Learners: Patient  Readiness: Refuses  Method: Handout  Response: Refused Teaching  Contact name and number provided.    Austin Haider RD, LD  Contact Number: [phone]     
Patient known to CHF clinic. Last seen in clinic 8/18/20. Patient missed appointment in 2021 and did not call to reschedule ReEducated patient regarding heart failure management by explaining the importance of obtaining daily weights at the same time every day with approximately the same amount of clothing, how to recognize symptoms, low sodium diet and taking prescribed medications as ordered. Patient was given our heart failure booklet. Patient was receptive to the education given and verbalized understanding.     
Patient taken by wheelchair to discharge lobby.   
Pharmacy Medication History Note      List of current medications patient is taking is complete.    Source of information: dispense report, recent admission notes    Changes made to medication list:  Medications removed (include reason, ex. therapy complete or physician discontinued):  none    Medications added/doses adjusted:  Warfarin 2.5 mg on Fridays, 5 mg every other day    Other notes (ex. Recent course of antibiotics, Coumadin dosing):  Patient gets warfarin monitored by Leonel Adorno CNP at [facility]. Current weekly regimen is 2.5 mg on Fridays and 5 mg every other day. Goal INR is 2-3.   Patient is noncompliant with Bumex at home.  Denies use of other OTC or herbal medications.    Allergies reviewed    Electronically signed by Ade Schroeder on 3/8/2024 at 11:08 AM                                                     
Pt noted to be having non-sustained runs of VT, (15-5 beats) non symptomatic with ectopy. Dr Manning notified of runs of VT. No new orders received at this time other than to monitor.  
Purpose of HFNC explained to patient and patient still refusing to wear. Currently on 5L NC tolerating well SPO2 >90%. HFNC remains at bedside if patient changes his mind.   
Warfarin Pharmacy Consult Note    Garrett Duncan is a 58 y.o. male for whom pharmacy has been asked to manage warfarin therapy.     Consulting Provider: Abi Cartagena PA-C   Warfarin Indication: Atrial fibrillation or Atrial flutter  Target INR range: 2.0-3.0   Warfarin dose prior to admission: 2.5 mg F, 5 mg all other days  Outpatient warfarin provider: Gretchen Carter    No results for input(s): \"INR\" in the last 72 hours.  Recent Labs     03/07/24  1651   HGB 10.2*        Concurrent anticoagulants/antiplatelets: Brilinta  Significant warfarin drug-drug interactions: amiodarone    Date INR Warfarin Dose   3/7/24 none 5 mg (PTA)                                   INR will be monitored routinely until therapeutic INR is achieved.    Pharmacy will continue to follow. Thank you for the consult,   Analilia Skinner [facility]  3/7/2024 9:19 PM       
Warfarin Pharmacy Consult Note    Warfarin Indication: Atrial fibrillation or Atrial flutter  Target INR: 2.0-3.0  Dose prior to admission: 2.5 mg F, 5 mg all other days    Recent Labs     03/08/24  0356   INR 2.05*     Recent Labs     03/07/24  1651 03/08/24  0356   HGB 10.2* 9.3*    180     Concurrent anticoagulants/antiplatelets: Brilinta  Significant warfarin drug-drug interactions: amiodarone PTA     Date INR Warfarin Dose   3/7/24 none 5 mg (PTA)    3/8/24 2.05  5 mg                                                 Monitoring:                   INR will be monitored daily until therapeutic INR is achieved.    **Please contact pharmacy for discharge instructions when indicated**    Arianna Woo PharmD, BCPS 3/8/2024 10:59 AM      
Warfarin Pharmacy Consult Note    Warfarin Indication: Atrial fibrillation or Atrial flutter  Target INR: 2.0-3.0  Dose prior to admission: 2.5mg F, 5mg all other days    Recent Labs     03/08/24  0356 03/08/24  1103 03/09/24  0558   INR 2.05* 2.14* 2.09*     Recent Labs     03/07/24  1651 03/08/24  0356 03/08/24  1103   HGB 10.2* 9.3* 9.2*    180 173     Concurrent anticoagulants/antiplatelets: Brilinta  Significant warfarin drug-drug interactions: amiodarone PTA     Date INR Warfarin Dose   3/7/24 none 5 mg (PTA)    3/8/24 2.05  5 mg     3/9/24 2.09  5 mg                                        Monitoring:                   INR will be monitored daily until therapeutic INR is achieved.    **Please contact pharmacy for discharge instructions when indicated**    Stephenie ROSAS.Ph., BCPS., 3/9/2024,8:01 AM      
Warfarin Pharmacy Consult Note    Warfarin Indication: Atrial fibrillation or Atrial flutter  Target INR: 2.0-3.0  Dose prior to admission: 2.5mg F, 5mg all other days    Recent Labs     03/08/24  1103 03/09/24  0558 03/10/24  0439   INR 2.14* 2.09* 2.00*     Recent Labs     03/07/24  1651 03/08/24  0356 03/08/24  1103   HGB 10.2* 9.3* 9.2*    180 173     Concurrent anticoagulants/antiplatelets: Brilinta  Significant warfarin drug-drug interactions: amiodarone PTA     Date INR Warfarin Dose   3/7/24 none 5 mg (PTA)    3/8/24 2.05  5 mg     3/9/24 2.09  5 mg    3/10/24   2.00 6 mg                                Monitoring:                   INR will be monitored daily until therapeutic INR is achieved.    **Please contact pharmacy for discharge instructions when indicated**    Stephenie ROSAS.Juan A., BCPS., 3/10/2024,7:31 AM      
thiamine  100 mg Oral Daily    ticagrelor  90 mg Oral BID    sodium chloride flush  10 mL IntraVENous 2 times per day    warfarin placeholder: dosing by pharmacy   Other RX Placeholder       Lab Data :  CBC:   Recent Labs     03/07/24  1651 03/08/24  0356 03/08/24  1103 03/09/24  0558   WBC 8.6 8.1 10.9* 8.7   HGB 10.2* 9.3* 9.2*  --    HCT 33.9* 32.6* 32.4*  --     180 173  --        CMP:  Recent Labs     03/08/24  0116 03/08/24  0356 03/08/24  1722 03/09/24  0558 03/10/24  0439      < > 143 140 138   K 4.2   < > 4.6 4.7 4.5      < > 109 106 104   CO2 20*   < > 20* 22* 23   BUN 31*   < > 29* 29* 31*   CREATININE 3.1*   < > 3.3* 3.4* 3.4*   GLUCOSE 199*   < > 164* 123* 131*   CALCIUM 8.3*   < > 8.5 8.3* 8.6   MG 2.8*  --  2.6* 2.5*  --    PHOS 4.4  --  4.2 4.2  --     < > = values in this interval not displayed.       Hepatic:   Recent Labs     03/07/24  1651 03/08/24  1103   LABALBU 3.7 3.5   AST 18 16   ALT 15 14   BILITOT 0.4 0.3   ALKPHOS 121 113         Assessment and Plan:  Renal -CKD stage IV overall creatinine appears to be stable close to baseline  Mild elevation may be due to diuretics  Volume status is much better, continue Bumex  Monitor BMP  Electrolytes -within normal limits  Essential hypertension  Recent history of pancreatitis  Chest pain being seen by cardiology  History of alcohol abuse  Diabetes mellitus  Meds reviewed and discussed with patient    Samuel Kathleen MD  [facility]    This report has been created using voice recognition software. It may contain minor errors which are inherent in voice recognition technology  
hours.    Microbiology:    Blood culture #1:   Lab Results   Component Value Date/Time    BC No growth-preliminary  No growth   02/03/2019 07:01 AM       Blood culture #2:No results found for: \"BLOODCULT2\"    Organism:  Lab Results   Component Value Date/Time    ORG Growth of Contaminants 01/03/2024 10:44 AM         Lab Results   Component Value Date/Time    LABGRAM  06/30/2017 01:10 PM     No segmented neutrophils observed.  No epithelial cells observed.  No bacteria seen.         MRSA culture only:No results found for: \"MRSAC\"    Urine culture:   Lab Results   Component Value Date/Time    LABURIN  01/03/2024 10:44 AM     No significant pathogens isolated. Growth likely represents skin titus or distal urethral titus.       Respiratory culture: No results found for: \"CULTRESP\"    Aerobic and Anaerobic :  Lab Results   Component Value Date/Time    LABAERO No growth-preliminary  No growth   06/30/2017 01:10 PM     Lab Results   Component Value Date/Time    LABANAE No growth-preliminary  No growth   06/30/2017 01:10 PM       Urinalysis:      Lab Results   Component Value Date/Time    NITRU NEGATIVE 02/29/2024 10:00 PM    WBCUA 5-9 02/29/2024 10:00 PM    BACTERIA NONE SEEN 02/29/2024 10:00 PM    RBCUA 0-2 02/29/2024 10:00 PM    BLOODU NEGATIVE 02/29/2024 10:00 PM    SPECGRAV 1.018 02/29/2024 10:00 PM    GLUCOSEU 500 11/29/2023 10:30 PM       Radiology:  XR CHEST PORTABLE   Final Result   1. Moderate cardiomegaly. Permanent pacemaker/defibrillator. Metallic sternotomy sutures. Poor inflation the lungs.   2. Question mild interstitial pneumonitis/atelectasis both parahilar region and both lung bases. Tiny pleural effusion left side.            **This report has been created using voice recognition software.  It may contain minor errors which are inherent in voice recognition technology.**      Final report electronically signed by Dr. Sandeep Hinson on 3/8/2024 11:41 AM      XR CHEST PORTABLE   Final Result   There may be 
electronically signed by: Lucretia Lynch MD on    03/07/2024 05:20 PM        XR CHEST PORTABLE    Result Date: 3/8/2024  PROCEDURE: XR CHEST PORTABLE CLINICAL INFORMATION: hypoxia COMPARISON: 3/7/2024 TECHNIQUE: A single mobile view of the chest was obtained.     1. Moderate cardiomegaly. Permanent pacemaker/defibrillator. Metallic sternotomy sutures. Poor inflation the lungs. 2. Question mild interstitial pneumonitis/atelectasis both parahilar region and both lung bases. Tiny pleural effusion left side. **This report has been created using voice recognition software.  It may contain minor errors which are inherent in voice recognition technology.** Final report electronically signed by Dr. Sandeep Hinson on 3/8/2024 11:41 AM    XR CHEST PORTABLE    Result Date: 3/7/2024  1 view chest x-ray Comparison: CR/SR - XR RIBS LEFT INCLUDE CHEST (MIN 3 VIEWS) - 01/02/2024 02:32 AM EST Findings: There may be a mild degree of airspace filling within the bilateral perihilar regions. No consolidative process. Very limited evaluation for pleural fluid. Moderately enlarged heart. An AICD is present. There has been a prior sternotomy. No acute fracture.     There may be a mild degree of bilateral suprahilar infiltrate or edema. This document has been electronically signed by: Lucretia Lynch MD on 03/07/2024 05:20 PM          Electronically signed by Justina Manning MD 3/8/2024

## 2024-03-11 NOTE — CARE COORDINATION
3/11/24, 2:29 PM EDT    Patient goals/plan/ treatment preferences discussed by  and .  Patient goals/plan/ treatment preferences reviewed with patient/ family.  Patient/ family verbalize understanding of discharge plan and are in agreement with goal/plan/treatment preferences.  Understanding was demonstrated using the teach back method.  AVS provided by RN at time of discharge, which includes all necessary medical information pertaining to the patients current course of illness, treatment, post-discharge goals of care, and treatment preferences.     Services At/After Discharge: None             Planning discharge home today with follow up with PCP, Nephrology and Cardiology. Denies needs for discharge.     Electronically signed by Debra García RN on 3/11/2024 at 2:32 PM

## 2024-03-11 NOTE — DISCHARGE INSTR - MEDS
Warfarin Discharge Instructions:   Date Warfarin Dose   3/12/24 (tomorrow) 5 mg (1 tablet)   3/13/24 5 mg (1 tablet)   3/14/24 INR check at clinic     Provider dosing warfarin: [facility]  Next INR date: Thursday 3/14/24 @ 3PM

## 2024-03-11 NOTE — ADT AUTH CERT
non-productive  Indication for Bronchodilator Therapy: Decreased or absent breath sounds  Bronchodilator Assessment Score: 7        MEDICATIONS:  nitroGLYCERIN 200 mcg/mL in dextrose 5%     BUMEX) injection 1 mg x1     BRILINTA) tablet 90 mg bid   MEXITIL) capsule 300 mg tid  (DUONEB) nebulizer solution 1 Dose q4  APRESOLINE) tablet 100 mg tid  (LIPITOR) tablet 40 mg nightly      PRN Meds  TYLENOL) tablet 650 mg x1        ORDERS:  Place Obs Intermediate Care   Heated/ Humidified High Flow Nasal Cannula  Monitor Pulse Ox  Initiate RT Inhaler-Nebulizer Bronchodilator Protocol  Labs  Pending Cxs  EKG  Echo  Up with Assistance  ADULT DIET; Regular; 3 carb choices (45 gm/meal); Low Sodium (2 gm); Low Potassium (Less than 3000 mg/day); 2000 ml        Chest Pain Clinical Indications for Admission to Inpatient Care by Haylee Leigh RN  Last Updated by Haylee Leigh RN on 3/8/2024 4781     Review Status Created By   Primary Completed Haylee Leigh RN       Review Type   Admission      Criteria Review   Chest Pain Clinical Indications for Admission to Inpatient Care     Overall Determination: Indications Not Met     Notes:  -- 3/8/2024  9:29 AM by Haylee Leigh RN --      + shortness of breath         -- 3/8/2024  9:28 AM by Haylee Leigh RN --      Chest Pain                  R/O ACS

## 2024-03-11 NOTE — TELEPHONE ENCOUNTER
Patient calling to see if we have received forms.  Per Lenoel, forms are here.  He has not filled them out yet.      Patient notified and understanding voiced.  Patient being discharged today.  Hospital follow-up appt scheduled for this week per his request. Planning on RTW on Monday if Leonel approves.  Will discuss at appt on Thursday.

## 2024-03-12 ENCOUNTER — CARE COORDINATION (OUTPATIENT)
Dept: CASE MANAGEMENT | Age: 59
End: 2024-03-12

## 2024-03-12 ENCOUNTER — TELEPHONE (OUTPATIENT)
Dept: FAMILY MEDICINE CLINIC | Age: 59
End: 2024-03-12

## 2024-03-12 ENCOUNTER — HOSPITAL ENCOUNTER (EMERGENCY)
Age: 59
Discharge: HOME | End: 2024-03-13
Attending: EMERGENCY MEDICINE
Payer: COMMERCIAL

## 2024-03-12 VITALS
BODY MASS INDEX: 34.65 KG/M2 | TEMPERATURE: 98.6 F | SYSTOLIC BLOOD PRESSURE: 156 MMHG | HEIGHT: 74 IN | HEART RATE: 65 BPM | RESPIRATION RATE: 18 BRPM | WEIGHT: 270 LBS | DIASTOLIC BLOOD PRESSURE: 82 MMHG | OXYGEN SATURATION: 98 %

## 2024-03-12 DIAGNOSIS — S09.93XA INJURY OF MOUTH, INITIAL ENCOUNTER: Primary | ICD-10-CM

## 2024-03-12 PROCEDURE — 99282 EMERGENCY DEPT VISIT SF MDM: CPT

## 2024-03-12 ASSESSMENT — PAIN SCALES - GENERAL: PAINLEVEL_OUTOF10: 3

## 2024-03-12 ASSESSMENT — PAIN - FUNCTIONAL ASSESSMENT: PAIN_FUNCTIONAL_ASSESSMENT: 0-10

## 2024-03-12 NOTE — TELEPHONE ENCOUNTER
Jesica is also going to try to come to this appt, but not sure if she can since they are short staffed at work.

## 2024-03-12 NOTE — TELEPHONE ENCOUNTER
Daughter Jesica [facility] called office regarding recent hospital stay, she is concerned with pt's C02 labs that were elevated. Pt said it's because of him smoking. She feels it is coming from something more serious that he is not being honest about. I let her know pt has a follow up appt scheduled this week on Thursday at 2:30pm. Jesica would like test results reviewed with pt at this appt. [email or web address].

## 2024-03-12 NOTE — TELEPHONE ENCOUNTER
Care Transitions Initial Follow Up Call    Outreach made within 2 business days of discharge: Yes    Patient: Garrett Duncan Patient : 1965   MRN: [id number]  Reason for Admission: There are no discharge diagnoses documented for the most recent discharge.  Discharge Date: 3/11/24       Spoke with: Attempted to speak with patient, LVM    Discharge department/facility: [facility]    Scheduled appointment with PCP within 7-14 days    Follow Up  Future Appointments   Date Time Provider Department Center   3/14/2024  2:30 PM Leonel Adorno, APRN - CNP [facility] [place]   3/25/2024  1:00 PM Jen Nielsen APRN - CNP [facility] [place]   3/25/2024  3:00 PM Samuel Kathleen MD [facility] [place]   2024  1:15 PM Anna Rubio APRN - CNP [facility] [place]   2024  3:30 PM [facility] PULMONARY FUNCTION ROOM 1 [facility]   2024  3:30 PM Leonel Adorno, APRN - CNP [facility] [place]   2024  3:30 PM Shelly Johnson MD [facility] [place]   2025  1:45 PM Fany Guzmán MD [facility] [place]       Camila Yao LPN     sensation intact/responds to pain/responds to verbal commands

## 2024-03-12 NOTE — CARE COORDINATION
Care Transitions Outreach Attempt    Call within 2 business days of discharge: Yes   Attempted to reach patient for transitions of care follow up. Unable to reach patient.    Patient: Garrett Duncan Patient : 1965 MRN: [id number]    Last Discharge Facility       Date Complaint Diagnosis Description Type Department Provider    3/7/24 Shortness of Breath Congestive heart failure, unspecified HF chronicity, unspecified heart failure type (HCC) ... ED to Hosp-Admission (Discharged) (ADMITTED) Justina Moyer MD; Mo Neal...              Was this an external facility discharge? No Discharge Facility Name: [facility]    Noted following upcoming appointments from discharge chart review:   [facility] follow up appointment(s):   Future Appointments   Date Time Provider Department Center   3/14/2024  2:30 PM Leonel Adorno APRN - CNP [facility] [place]   3/25/2024  1:00 PM Jne Nielsen APRN - CNP [facility] [place]   3/25/2024  3:00 PM Samuel Kathleen MD [facility] [place]   2024  1:15 PM Anna Rubio APRN - CNP [facility] [place]   2024  3:30 PM [facility] PULMONARY FUNCTION ROOM 1 [facility]   2024  3:30 PM Leonel Adorno APRN - CNP [facility] [place]   2024  3:30 PM Shelly Johnson MD [facility] [place]   2025  1:45 PM Fany Guzmán MD [facility] [place]     Non-[facility]  follow up appointment(s):      1st attempt to contact pt for initial care transition follow up.  Pt is a 7 day readmit.  Spoke very briefly with pt who was very abrupt. Garrett states he is doing okay, just resting.  Denies having any further c/o chest pain.  Pt annoyed with questions on how he is feeling.  States he is frustrated because he was asked the same questions while he was in the hospital.  CTN attempted to explain reason for call and questions.  CTN unable to complete initial call d/t pt becoming agitated.  CTN will attempt to call back on another

## 2024-03-13 ENCOUNTER — CARE COORDINATION (OUTPATIENT)
Dept: CASE MANAGEMENT | Age: 59
End: 2024-03-13

## 2024-03-13 ENCOUNTER — PATIENT MESSAGE (OUTPATIENT)
Dept: FAMILY MEDICINE CLINIC | Age: 59
End: 2024-03-13

## 2024-03-13 DIAGNOSIS — F41.3 OTHER MIXED ANXIETY DISORDERS: ICD-10-CM

## 2024-03-13 RX ORDER — ALPRAZOLAM 0.5 MG/1
TABLET ORAL
Qty: 30 TABLET | Refills: 2 | Status: SHIPPED | OUTPATIENT
Start: 2024-03-13 | End: 2024-06-11

## 2024-03-13 NOTE — TELEPHONE ENCOUNTER
Patient calling to check on status of [employer] forms.  [facility] and [facility] are [facility] tomorrow.  Please advise

## 2024-03-13 NOTE — TELEPHONE ENCOUNTER
Was going to discuss at appointment tomorrow but can send in today with return date of next monday

## 2024-03-13 NOTE — DISCHARGE INSTRUCTIONS
If tongue starts to bleed again, attempt to use dark tea bag in the mouth on area of bleeding for 10 to 15 minutes at a time.  If does not improve the bleeding, may need to come in for medication to swish and prevent further bleeding.

## 2024-03-13 NOTE — CARE COORDINATION
Care Transitions Outreach Attempt    Call within 2 business days of discharge: Yes   Attempted to reach patient for transitions of care follow up. Unable to reach patient.    Patient: Garrett Duncan Patient : 1965 MRN: [id number]    Last Discharge Facility       Date Complaint Diagnosis Description Type Department Provider    3/12/24 Mouth Injury Injury of mouth, initial encounter ED (DISCHARGE) [facility], MD              Was this an external facility discharge? No Discharge Facility Name: [facility]    Noted following upcoming appointments from discharge chart review:   [facility] follow up appointment(s):   Future Appointments   Date Time Provider Department Center   3/14/2024  2:30 PM Leonel Adorno, APRN - CNP [facility] [place]   3/25/2024  1:00 PM Jen Nielsen APRN - CNP [facility] [place]   3/25/2024  3:00 PM Samuel Kathleen MD [facility] [place]   2024  1:15 PM Anna Rubio APRN - CNP [facility] [place]   2024  3:30 PM [facility] PULMONARY FUNCTION ROOM 1 [facility]   2024  3:30 PM Leonel Adorno, APRN - CNP [facility] [place]   2024  3:30 PM Shelly Johnson MD [facility] [place]   2025  1:45 PM Fany Guzmán MD [facility] [place]     Non-[facility]  follow up appointment(s):     2nd attempt to contact pt for initial care transition follow up.  Unable to reach pt.  Phone line rang multiple times.  Call finally picked up.  CTN attempted to introduce self and call was disconnected.  Will attempt to outreach one more time.      Jennifer Cevallos RN

## 2024-03-13 NOTE — TELEPHONE ENCOUNTER
Spoke with patient and ok with RTW on Monday, March 18th.  Requesting that you fill out and fax forms today.

## 2024-03-13 NOTE — ED PROVIDER NOTES
does not control bleeding, patient vies to return to the ED.    ED COURSE   ED Medications administered this visit (None if left blank):   Medications - No data to display             PROCEDURES: (None if blank)  Procedures:     CRITICAL CARE:  None    MEDICATION CHANGES     Discharge Medication List as of 3/13/2024  1:30 AM            FINAL DISPOSITION   Shared Decision-Making was performed, disposition discussed with the patient/family and questions answered.      Outpatient follow up (If applicable):  Leonel Adorno, APRN - CNP  [address] [address]  [phone]          The results of pertinent diagnostic studies and exam findings were discussed with the patient/surrogate. The patient’s provisional diagnosis and plan of care were discussed with the patient and present family who expressed understanding. Medications were reviewed and indications and risks of medications were discussed with the patient/family present. Strict return precautions and follow up instructions were discussed with the patient prior to discharge, with which the patient agrees.              FINAL DIAGNOSES:  Final diagnoses:   Injury of mouth, initial encounter       Condition: condition: good  Dispo: Discharge to home  DISPOSITION Decision To Discharge 03/13/2024 01:29:20 AM      This transcription was electronically signed. It was dictated by use of voice recognition software and electronically transcribed. The transcription may contain errors not detected in proofreading.

## 2024-03-13 NOTE — TELEPHONE ENCOUNTER
Care Transitions Initial Follow Up Call    Outreach made within 2 business days of discharge: Yes    Patient: Garrett Duncan Patient : 1965   MRN: [id number]  Reason for Admission: There are no discharge diagnoses documented for the most recent discharge.  Discharge Date: 3/13/24       Spoke with: LVM    Discharge department/facility: [facility]    Scheduled appointment with PCP within 7-14 days    Follow Up  Future Appointments   Date Time Provider Department Center   3/14/2024  2:30 PM Leonel Adorno APRN - CNP [facility] [place]   3/25/2024  1:00 PM Jen Nielsen APRN - CNP [facility] [place]   3/25/2024  3:00 PM Samuel Kathleen MD [facility] [place]   2024  1:15 PM Anna Rubio APRN - CNP [facility] [place]   2024  3:30 PM [facility] PULMONARY FUNCTION ROOM 1 [facility]   2024  3:30 PM Leonel Adorno APRN - CNP [facility] [place]   2024  3:30 PM Shelly Johnson MD [facility] [place]   2025  1:45 PM Fany Guzmán MD [facility] [place]       Camila Yao LPN

## 2024-03-13 NOTE — ED TRIAGE NOTES
Pt presents to the ED with c/o tongue laceration. Pt states he bit his tongue around 2100 and is unable to get bleeding to stop. On arrival, pt appears to have small laceration to the right side of the tongue with bleeding.

## 2024-03-14 ENCOUNTER — CARE COORDINATION (OUTPATIENT)
Dept: CASE MANAGEMENT | Age: 59
End: 2024-03-14

## 2024-03-14 DIAGNOSIS — I50.43 ACUTE ON CHRONIC COMBINED SYSTOLIC AND DIASTOLIC CONGESTIVE HEART FAILURE (HCC): Primary | ICD-10-CM

## 2024-03-14 NOTE — CARE COORDINATION
follow up appointment scheduled within 7 days of discharge? Yes.    Follow Up  Future Appointments   Date Time Provider Department Center   3/18/2024  7:45 AM Leonel Adorno APRN - CNP [facility] [place]   3/25/2024  1:00 PM Jen Nielsen APRN - CNP [facility] [place]   3/25/2024  3:00 PM Samuel Kathleen MD [facility] [place]   4/4/2024  1:15 PM Anna Rubio APRN - CNP [facility] [place]   8/12/2024  3:30 PM [facility]   8/20/2024  3:30 PM Leonel Adorno APRN - CNP [facility] [place]   8/21/2024  3:30 PM [facility] [place]   2/28/2025  1:45 PM [facility] [place]       Care Transition Nurse provided contact information.  Plan for follow-up call in 5-7 days based on severity of symptoms and risk factors.  Plan for next call: symptom management-CP, SOB, oral intake, alcohol intake, urinary/bowels, any new or worsening symptoms    Jennifer Cevallos RN

## 2024-03-18 ENCOUNTER — OFFICE VISIT (OUTPATIENT)
Dept: FAMILY MEDICINE CLINIC | Age: 59
End: 2024-03-18

## 2024-03-18 VITALS
RESPIRATION RATE: 16 BRPM | HEART RATE: 68 BPM | WEIGHT: 263 LBS | DIASTOLIC BLOOD PRESSURE: 80 MMHG | BODY MASS INDEX: 33.77 KG/M2 | SYSTOLIC BLOOD PRESSURE: 156 MMHG

## 2024-03-18 DIAGNOSIS — F10.231 ALCOHOL DEPENDENCE WITH WITHDRAWAL DELIRIUM (HCC): ICD-10-CM

## 2024-03-18 DIAGNOSIS — Z09 HOSPITAL DISCHARGE FOLLOW-UP: Primary | ICD-10-CM

## 2024-03-18 DIAGNOSIS — R06.00 DYSPNEA, UNSPECIFIED TYPE: ICD-10-CM

## 2024-03-18 DIAGNOSIS — I25.709 CORONARY ARTERY DISEASE INVOLVING CORONARY BYPASS GRAFT OF NATIVE HEART WITH ANGINA PECTORIS (HCC): ICD-10-CM

## 2024-03-18 DIAGNOSIS — J81.1 CHRONIC PULMONARY EDEMA: ICD-10-CM

## 2024-03-18 DIAGNOSIS — I42.0 DILATED CARDIOMYOPATHY (HCC): ICD-10-CM

## 2024-03-18 DIAGNOSIS — I10 ESSENTIAL HYPERTENSION: ICD-10-CM

## 2024-03-18 DIAGNOSIS — F32.A ACUTE DEPRESSION: ICD-10-CM

## 2024-03-18 DIAGNOSIS — Z95.810 ICD (IMPLANTABLE CARDIOVERTER-DEFIBRILLATOR) IN PLACE: ICD-10-CM

## 2024-03-18 DIAGNOSIS — I50.22 CHRONIC SYSTOLIC CONGESTIVE HEART FAILURE (HCC): ICD-10-CM

## 2024-03-18 DIAGNOSIS — N18.4 CKD (CHRONIC KIDNEY DISEASE) STAGE 4, GFR 15-29 ML/MIN (HCC): ICD-10-CM

## 2024-03-18 DIAGNOSIS — F41.3 OTHER MIXED ANXIETY DISORDERS: ICD-10-CM

## 2024-03-18 DIAGNOSIS — E11.22 CONTROLLED TYPE 2 DIABETES MELLITUS WITH CHRONIC KIDNEY DISEASE, WITHOUT LONG-TERM CURRENT USE OF INSULIN, UNSPECIFIED CKD STAGE (HCC): ICD-10-CM

## 2024-03-18 ASSESSMENT — ENCOUNTER SYMPTOMS
COUGH: 0
NAUSEA: 0
ABDOMINAL PAIN: 0
SHORTNESS OF BREATH: 0

## 2024-03-18 NOTE — PROGRESS NOTES
every morning     dicyclomine 10 MG capsule  Commonly known as: BENTYL  Take 1 capsule by mouth 4 times daily (before meals and nightly)     folic acid 1 MG tablet  Commonly known as: FOLVITE  Take 1 tablet by mouth daily     glucose 4 g chewable tablet  Take 4 tablets by mouth as needed for Low blood sugar     hydrALAZINE 100 MG tablet  Commonly known as: APRESOLINE  Take 1 tablet by mouth 3 times daily     isosorbide mononitrate 120 MG extended release tablet  Commonly known as: IMDUR  Take 1 tablet by mouth daily     Lantus SoloStar 100 UNIT/ML injection pen  Generic drug: insulin glargine     linagliptin 5 MG tablet  Commonly known as: Tradjenta  TAKE 1 TABLET BY MOUTH DAILY     metoprolol succinate 100 MG extended release tablet  Commonly known as: Toprol XL  Take 1 tablet by mouth daily     mexiletine 150 MG capsule  Commonly known as: MEXITIL  Take 2 capsules by mouth every 8 hours     multivitamin Tabs tablet  Take 1 tablet by mouth daily     thiamine 100 MG tablet  Take 1 tablet by mouth daily     ticagrelor 90 MG Tabs tablet  Commonly known as: Brilinta  Take 1 tablet by mouth 2 times daily               Medications marked \"taking\" at this time  Outpatient Medications Marked as Taking for the 3/18/24 encounter (Office Visit) with Leonel Adorno APRN - CNP   Medication Sig Dispense Refill    ALPRAZolam (XANAX) 0.5 MG tablet TAKE 1 TABLET BY MOUTH AT BEDTIME FOR ANXIETY 30 tablet 2    folic acid (FOLVITE) 1 MG tablet Take 1 tablet by mouth daily 30 tablet 0    mexiletine (MEXITIL) 150 MG capsule Take 2 capsules by mouth every 8 hours 120 capsule 1    hydrALAZINE (APRESOLINE) 100 MG tablet Take 1 tablet by mouth 3 times daily 180 tablet 0    dicyclomine (BENTYL) 10 MG capsule Take 1 capsule by mouth 4 times daily (before meals and nightly) 120 capsule 0    thiamine 100 MG tablet Take 1 tablet by mouth daily 30 tablet 0    buPROPion (WELLBUTRIN XL) 150 MG extended release tablet Take 1 tablet by mouth every

## 2024-03-18 NOTE — PATIENT INSTRUCTIONS
You may receive a survey about your visit with us today. The feedback from our patients helps us identify what is working well and where the service to all patients can be enhanced. Thank you!

## 2024-03-19 ENCOUNTER — CARE COORDINATION (OUTPATIENT)
Dept: CASE MANAGEMENT | Age: 59
End: 2024-03-19

## 2024-03-20 ENCOUNTER — APPOINTMENT (OUTPATIENT)
Dept: GENERAL RADIOLOGY | Age: 59
End: 2024-03-20
Payer: COMMERCIAL

## 2024-03-20 ENCOUNTER — HOSPITAL ENCOUNTER (EMERGENCY)
Age: 59
Discharge: HOME | End: 2024-03-20
Attending: EMERGENCY MEDICINE
Payer: COMMERCIAL

## 2024-03-20 VITALS
OXYGEN SATURATION: 97 % | RESPIRATION RATE: 12 BRPM | HEART RATE: 61 BPM | BODY MASS INDEX: 33.75 KG/M2 | SYSTOLIC BLOOD PRESSURE: 150 MMHG | WEIGHT: 263 LBS | TEMPERATURE: 97.9 F | DIASTOLIC BLOOD PRESSURE: 74 MMHG | HEIGHT: 74 IN

## 2024-03-20 DIAGNOSIS — R42 LIGHTHEADEDNESS: Primary | ICD-10-CM

## 2024-03-20 DIAGNOSIS — R00.2 PALPITATIONS: ICD-10-CM

## 2024-03-20 LAB
ALBUMIN SERPL BCG-MCNC: 3.5 G/DL (ref 3.5–5.1)
ALP SERPL-CCNC: 90 U/L (ref 38–126)
ALT SERPL W/O P-5'-P-CCNC: 11 U/L (ref 11–66)
AMPHETAMINES UR QL SCN: NEGATIVE
ANION GAP SERPL CALC-SCNC: 14 MEQ/L (ref 8–16)
AST SERPL-CCNC: 19 U/L (ref 5–40)
BACTERIA URNS QL MICRO: ABNORMAL /HPF
BARBITURATES UR QL SCN: NEGATIVE
BASOPHILS ABSOLUTE: 0.1 THOU/MM3 (ref 0–0.1)
BASOPHILS NFR BLD AUTO: 1 %
BENZODIAZ UR QL SCN: NEGATIVE
BILIRUB SERPL-MCNC: 0.3 MG/DL (ref 0.3–1.2)
BILIRUB UR QL STRIP.AUTO: NEGATIVE
BUN SERPL-MCNC: 34 MG/DL (ref 7–22)
BZE UR QL SCN: NEGATIVE
CALCIUM SERPL-MCNC: 8.1 MG/DL (ref 8.5–10.5)
CANNABINOIDS UR QL SCN: NEGATIVE
CASTS #/AREA URNS LPF: ABNORMAL /LPF
CASTS 2: ABNORMAL /LPF
CHARACTER UR: CLEAR
CHLORIDE SERPL-SCNC: 100 MEQ/L (ref 98–111)
CO2 SERPL-SCNC: 22 MEQ/L (ref 23–33)
COLOR: YELLOW
CREAT SERPL-MCNC: 3.5 MG/DL (ref 0.4–1.2)
CRYSTALS URNS MICRO: ABNORMAL
DEPRECATED RDW RBC AUTO: 57.8 FL (ref 35–45)
EKG ATRIAL RATE: 66 BPM
EKG P AXIS: 65 DEGREES
EKG P-R INTERVAL: 186 MS
EKG Q-T INTERVAL: 460 MS
EKG QRS DURATION: 96 MS
EKG QTC CALCULATION (BAZETT): 482 MS
EKG R AXIS: 8 DEGREES
EKG T AXIS: 93 DEGREES
EKG VENTRICULAR RATE: 66 BPM
EOSINOPHIL NFR BLD AUTO: 3.3 %
EOSINOPHILS ABSOLUTE: 0.2 THOU/MM3 (ref 0–0.4)
EPITHELIAL CELLS, UA: ABNORMAL /HPF
ERYTHROCYTE [DISTWIDTH] IN BLOOD BY AUTOMATED COUNT: 16.7 % (ref 11.5–14.5)
ETHANOL SERPL-MCNC: < 0.01 %
FENTANYL: NEGATIVE
GFR SERPL CREATININE-BSD FRML MDRD: 19 ML/MIN/1.73M2
GLUCOSE SERPL-MCNC: 268 MG/DL (ref 70–108)
GLUCOSE UR QL STRIP.AUTO: >= 1000 MG/DL
HCT VFR BLD AUTO: 28.3 % (ref 42–52)
HGB BLD-MCNC: 8.7 GM/DL (ref 14–18)
HGB UR QL STRIP.AUTO: NEGATIVE
IMM GRANULOCYTES # BLD AUTO: 0.03 THOU/MM3 (ref 0–0.07)
IMM GRANULOCYTES NFR BLD AUTO: 0.4 %
INR PPP: 2.6 (ref 0.85–1.13)
KETONES UR QL STRIP.AUTO: NEGATIVE
LYMPHOCYTES ABSOLUTE: 0.6 THOU/MM3 (ref 1–4.8)
LYMPHOCYTES NFR BLD AUTO: 7.6 %
MAGNESIUM SERPL-MCNC: 2.3 MG/DL (ref 1.6–2.4)
MCH RBC QN AUTO: 29.2 PG (ref 26–33)
MCHC RBC AUTO-ENTMCNC: 30.7 GM/DL (ref 32.2–35.5)
MCV RBC AUTO: 95 FL (ref 80–94)
MISCELLANEOUS 2: ABNORMAL
MONOCYTES ABSOLUTE: 0.7 THOU/MM3 (ref 0.4–1.3)
MONOCYTES NFR BLD AUTO: 9 %
NEUTROPHILS NFR BLD AUTO: 78.7 %
NITRITE UR QL STRIP: NEGATIVE
NRBC BLD AUTO-RTO: 0 /100 WBC
NT-PROBNP SERPL IA-MCNC: ABNORMAL PG/ML (ref 0–124)
OPIATES UR QL SCN: NEGATIVE
OSMOLALITY SERPL CALC.SUM OF ELEC: 289 MOSMOL/KG (ref 275–300)
OXYCODONE: NEGATIVE
PCP UR QL SCN: NEGATIVE
PH UR STRIP.AUTO: 7 [PH] (ref 5–9)
PLATELET # BLD AUTO: 181 THOU/MM3 (ref 130–400)
PMV BLD AUTO: 10.3 FL (ref 9.4–12.4)
POTASSIUM SERPL-SCNC: 4.2 MEQ/L (ref 3.5–5.2)
PROT SERPL-MCNC: 6.4 G/DL (ref 6.1–8)
PROT UR STRIP.AUTO-MCNC: 300 MG/DL
RBC # BLD AUTO: 2.98 MILL/MM3 (ref 4.7–6.1)
RBC URINE: ABNORMAL /HPF
RENAL EPI CELLS #/AREA URNS HPF: ABNORMAL /[HPF]
SEGMENTED NEUTROPHILS ABSOLUTE COUNT: 5.8 THOU/MM3 (ref 1.8–7.7)
SODIUM SERPL-SCNC: 136 MEQ/L (ref 135–145)
SP GR UR REFRACT.AUTO: 1.02 (ref 1–1.03)
TROPONIN, HIGH SENSITIVITY: 144 NG/L (ref 0–12)
TROPONIN, HIGH SENSITIVITY: 147 NG/L (ref 0–12)
UROBILINOGEN, URINE: 0.2 EU/DL (ref 0–1)
WBC # BLD AUTO: 7.4 THOU/MM3 (ref 4.8–10.8)
WBC #/AREA URNS HPF: ABNORMAL /HPF
WBC #/AREA URNS HPF: NEGATIVE /[HPF]
YEAST LIKE FUNGI URNS QL MICRO: ABNORMAL

## 2024-03-20 PROCEDURE — 84484 ASSAY OF TROPONIN QUANT: CPT

## 2024-03-20 PROCEDURE — 93010 ELECTROCARDIOGRAM REPORT: CPT | Performed by: INTERNAL MEDICINE

## 2024-03-20 PROCEDURE — 80053 COMPREHEN METABOLIC PANEL: CPT

## 2024-03-20 PROCEDURE — 85025 COMPLETE CBC W/AUTO DIFF WBC: CPT

## 2024-03-20 PROCEDURE — 82077 ASSAY SPEC XCP UR&BREATH IA: CPT

## 2024-03-20 PROCEDURE — 93005 ELECTROCARDIOGRAM TRACING: CPT

## 2024-03-20 PROCEDURE — 81001 URINALYSIS AUTO W/SCOPE: CPT

## 2024-03-20 PROCEDURE — 85610 PROTHROMBIN TIME: CPT

## 2024-03-20 PROCEDURE — 36415 COLL VENOUS BLD VENIPUNCTURE: CPT

## 2024-03-20 PROCEDURE — 80307 DRUG TEST PRSMV CHEM ANLYZR: CPT

## 2024-03-20 PROCEDURE — 71045 X-RAY EXAM CHEST 1 VIEW: CPT

## 2024-03-20 PROCEDURE — 83880 ASSAY OF NATRIURETIC PEPTIDE: CPT

## 2024-03-20 PROCEDURE — 83735 ASSAY OF MAGNESIUM: CPT

## 2024-03-20 PROCEDURE — 99285 EMERGENCY DEPT VISIT HI MDM: CPT

## 2024-03-20 ASSESSMENT — PAIN - FUNCTIONAL ASSESSMENT: PAIN_FUNCTIONAL_ASSESSMENT: NONE - DENIES PAIN

## 2024-03-20 NOTE — ED PROVIDER NOTES
I performed a history and physical examination of the patient and discussed management with the resident. I reviewed the resident’s note and agree with the documented findings and plan of care. Any areas of disagreement are noted on the chart. I was personally present for the key portions of any procedures. I have documented in the chart those procedures where I was not present during the key portions. I have reviewed the emergency nurses triage note. I agree with the chief complaint, past medical history, past surgical history, allergies, medications, social and family history as documented unless otherwise noted below. Documentation of the HPI, Physical Exam and Medical Decision Making performed by medical students or scribes is based on my personal performance of the HPI, PE and MDM. For Phys Assistant/ Nurse Practitioner cases/documentation I have personally evaluated this patient and have completed at least one if not all key elements of the E/M (history, physical exam, and MDM). My findings are as noted below.    In other words, I personally saw and examined the patient I have reviewed and agreed with the resident findings including all diagnostic interpretations and treatment plans as written.  I was present for the key portion of any procedures performed and the inclusive time noted in any critical care statement.    Patient is in today for complaints of dizziness palpitations.  58-year-old male coming in today with these complaints.  History of cardiac disease.  Patient has had intermittent problems with this.  Patient does have a history of atrial fibrillation.  He thought he felt palpitations earlier.  Patient states he has been taking all his medications as prescribed.  Patient has had no unilateral loss of function.  No trouble speaking or trouble seeing.  No trouble with understanding people.  Patient is currently without symptoms.  Patient is otherwise resting comfortably on the cot no apparent 
other than Emergency Medicine: None      ED COURSE   ED Medications administered this visit (None if left blank):   Medications - No data to display           (A negative COVID-19 test should be interpreted as COVID no longer suspected unless otherwise noted in this encounter documentation note)    PROCEDURES: (None if blank)  Procedures:       MEDICATION CHANGES     New Prescriptions    No medications on file         FINAL DISPOSITION   MDM  Patient 58-year-old male present emergency department with intermittent lightheadedness and palpitations.  Patient has extensive past medical history recent admission with discharge within 1 week from [facility] for intermittent ventricular tachycardia.  Patient's EKG stable, no major abnormal changes from patient's baseline.    Chest x-ray and repeat troponin currently in process.  Patient care signed out to resident physician Jesse at 8 PM.  Please see his note for final diagnosis and disposition of this patient.    Shared Decision-Making was performed, disposition discussed with the patient/family and questions answered.    Outpatient follow up (If applicable):  No follow-up provider specified.      FINAL DIAGNOSES:  Final diagnoses:   Lightheadedness   Palpitations       Condition: condition: stable  Dispo: Pending repeat troponin, chest x-ray  DISPOSITION        This transcription was electronically signed. It was dictated by use of voice recognition software and electronically transcribed. The transcription may contain errors not detected in proofreading.

## 2024-03-20 NOTE — ED NOTES
Report received from ALETA Parada. Pt ambulated to bathroom and returned to cot. Pt tolerated well. Pt denies dizziness or lightheadedness. Respirations easy and unlabored. Telemetry in place. Call light within reach.

## 2024-03-20 NOTE — ED TRIAGE NOTES
Patient to ED from home with c/o dizziness. Patient states earlier in the day he also had palpitations. Denies any chest pain or SOB at this time. Reports being recently admitted for SVT. Patient states he's not really dizzy at this time. VSS. Tele monitor in place.

## 2024-03-21 ENCOUNTER — TELEPHONE (OUTPATIENT)
Dept: FAMILY MEDICINE CLINIC | Age: 59
End: 2024-03-21

## 2024-03-21 NOTE — ED NOTES
Pt remains sitting on side of cot. Pt reports he has not had any episodes of dizziness or lightheadedness and would like to go home. Respirations easy and unlabored. No acute distress noted. Telemetry in place. Call light within reach.

## 2024-03-21 NOTE — DISCHARGE INSTRUCTIONS
Come back to the emergency department if severe chest pain, severe shortness of breath, persistence of fever, intractable vomiting, severe dizziness or lightheadedness, fainting.  Schedule an appointment with your primary care physician and Cardiology as recommended previously.  Read the documents attached.

## 2024-03-21 NOTE — ED NOTES
Pt sitting on side of cot at this time. Pt denies dizziness and lightheadedness, pt instructed to notify care team of new onset. Respirations easy and unlabored. Telemetry in place. Call light within reach.

## 2024-03-25 ENCOUNTER — OFFICE VISIT (OUTPATIENT)
Dept: NEPHROLOGY | Age: 59
End: 2024-03-25
Payer: COMMERCIAL

## 2024-03-25 VITALS
BODY MASS INDEX: 33.9 KG/M2 | SYSTOLIC BLOOD PRESSURE: 141 MMHG | HEART RATE: 65 BPM | WEIGHT: 264 LBS | OXYGEN SATURATION: 97 % | DIASTOLIC BLOOD PRESSURE: 68 MMHG

## 2024-03-25 DIAGNOSIS — N18.4 CKD (CHRONIC KIDNEY DISEASE) STAGE 4, GFR 15-29 ML/MIN (HCC): Primary | ICD-10-CM

## 2024-03-25 DIAGNOSIS — E11.21 DIABETIC NEPHROPATHY ASSOCIATED WITH TYPE 2 DIABETES MELLITUS (HCC): ICD-10-CM

## 2024-03-25 DIAGNOSIS — I10 HTN (HYPERTENSION), BENIGN: ICD-10-CM

## 2024-03-25 PROCEDURE — 3078F DIAST BP <80 MM HG: CPT | Performed by: INTERNAL MEDICINE

## 2024-03-25 PROCEDURE — 3077F SYST BP >= 140 MM HG: CPT | Performed by: INTERNAL MEDICINE

## 2024-03-25 PROCEDURE — 99213 OFFICE O/P EST LOW 20 MIN: CPT | Performed by: INTERNAL MEDICINE

## 2024-03-25 NOTE — PROGRESS NOTES
urology, probably cyst on the recent scan  meds reviewed and D/W patient  FU in 4 months    Tests and orders placed this Encounter     No orders of the defined types were placed in this encounter.        Samuel Kathleen M.D  [facility].

## 2024-03-26 ENCOUNTER — CARE COORDINATION (OUTPATIENT)
Dept: CASE MANAGEMENT | Age: 59
End: 2024-03-26

## 2024-03-26 NOTE — CARE COORDINATION
Care Transitions Outreach Attempt    Call within 2 business days of discharge: Yes   Attempted to reach patient for transitions of care follow up. Unable to reach patient.  Left HIPAA compliant message w/ CTN # to return call w/ no response day #1.    Patient: Garrett Duncan Patient : 1965 MRN: [id number]    Last Discharge Facility       Date Complaint Diagnosis Description Type Department Provider    3/20/24 Dizziness Lightheadedness ... ED (DISCHARGE) [facility] Romeo Iglesias, DO              Was this an external facility discharge? No Discharge Facility Name:     Noted following upcoming appointments from discharge chart review:   [facility] follow up appointment(s):   Future Appointments   Date Time Provider Department Center   2024  1:15 PM Anna Rubio APRN - CNP [facility] [place]   4/10/2024  1:30 PM Radha Miranda APRN - CNP [facility] [place]   2024  3:00 PM Samuel Kathleen MD [facility] [place]   2024  3:30 PM [facility] PULMONARY FUNCTION ROOM 1 [facility]   2024  3:30 PM Leonel Adorno, APRN - CNP [facility] [place]   2024  3:30 PM Shelly Johnson MD [facility] [place]   2025  1:45 PM Fany Guzmán MD [facility] [place]       follow up appointment(s):

## 2024-03-27 ENCOUNTER — CARE COORDINATION (OUTPATIENT)
Dept: CASE MANAGEMENT | Age: 59
End: 2024-03-27

## 2024-03-27 NOTE — CARE COORDINATION
Care Transitions Outreach Attempt    Call within 2 business days of discharge: Yes   Attempted to reach patient for transitions of care follow up. Unable to reach patient.  Left HIPAA compliant message w/ CTN # asking to return call day #2 w/ no response.  Brigitte neena sent as well.    Patient: Garrett Duncan Patient : 1965 MRN: [id number]    Last Discharge Facility       Date Complaint Diagnosis Description Type Department Provider    3/20/24 Dizziness Lightheadedness ... ED (DISCHARGE) [facility] Romeo Iglesias, DO              Was this an external facility discharge? No Discharge Facility Name:     Noted following upcoming appointments from discharge chart review:   [facility] follow up appointment(s):   Future Appointments   Date Time Provider Department Center   2024  1:15 PM Anna Rubio APRN - CNP [facility] [place]   4/10/2024  1:30 PM Radha Miranda APRN - CNP [facility] [place]   2024  3:00 PM Samuel Kathleen MD [facility] [place]   2024  3:30 PM [facility] PULMONARY FUNCTION ROOM 1 [facility]   2024  3:30 PM Leonel Adorno, APRN - CNP [facility] [place]   2024  3:30 PM Shelly Johnson MD [facility] [place]   2025  1:45 PM Fany Guzmán MD [facility] [place]       follow up appointment(s):

## 2024-04-01 RX ORDER — DAPAGLIFLOZIN 5 MG/1
5 TABLET, FILM COATED ORAL EVERY MORNING
Qty: 90 TABLET | Refills: 3 | Status: SHIPPED | OUTPATIENT
Start: 2024-04-01

## 2024-04-04 ENCOUNTER — APPOINTMENT (OUTPATIENT)
Dept: GENERAL RADIOLOGY | Age: 59
DRG: 204 | End: 2024-04-04
Payer: COMMERCIAL

## 2024-04-04 ENCOUNTER — HOSPITAL ENCOUNTER (INPATIENT)
Age: 59
LOS: 2 days | Discharge: HOME | DRG: 204 | End: 2024-04-07
Attending: STUDENT IN AN ORGANIZED HEALTH CARE EDUCATION/TRAINING PROGRAM | Admitting: STUDENT IN AN ORGANIZED HEALTH CARE EDUCATION/TRAINING PROGRAM
Payer: COMMERCIAL

## 2024-04-04 ENCOUNTER — APPOINTMENT (OUTPATIENT)
Dept: CT IMAGING | Age: 59
DRG: 204 | End: 2024-04-04
Payer: COMMERCIAL

## 2024-04-04 DIAGNOSIS — I50.9 ACUTE ON CHRONIC CONGESTIVE HEART FAILURE, UNSPECIFIED HEART FAILURE TYPE (HCC): ICD-10-CM

## 2024-04-04 DIAGNOSIS — D64.9 SYMPTOMATIC ANEMIA: ICD-10-CM

## 2024-04-04 DIAGNOSIS — F41.9 ANXIETY: ICD-10-CM

## 2024-04-04 DIAGNOSIS — R53.83 FATIGUE, UNSPECIFIED TYPE: Primary | ICD-10-CM

## 2024-04-04 PROBLEM — R06.02 SHORTNESS OF BREATH: Status: ACTIVE | Noted: 2024-04-04

## 2024-04-04 LAB
ALBUMIN SERPL BCG-MCNC: 3.8 G/DL (ref 3.5–5.1)
ALP SERPL-CCNC: 101 U/L (ref 38–126)
ALT SERPL W/O P-5'-P-CCNC: 10 U/L (ref 11–66)
ANION GAP SERPL CALC-SCNC: 14 MEQ/L (ref 8–16)
AST SERPL-CCNC: 13 U/L (ref 5–40)
BASOPHILS ABSOLUTE: 0 THOU/MM3 (ref 0–0.1)
BASOPHILS NFR BLD AUTO: 0.5 %
BILIRUB CONJ SERPL-MCNC: < 0.2 MG/DL (ref 0–0.3)
BILIRUB SERPL-MCNC: 0.4 MG/DL (ref 0.3–1.2)
BUN SERPL-MCNC: 38 MG/DL (ref 7–22)
CALCIUM SERPL-MCNC: 8.4 MG/DL (ref 8.5–10.5)
CHLORIDE SERPL-SCNC: 103 MEQ/L (ref 98–111)
CO2 SERPL-SCNC: 20 MEQ/L (ref 23–33)
CREAT SERPL-MCNC: 3.7 MG/DL (ref 0.4–1.2)
DEPRECATED RDW RBC AUTO: 58.1 FL (ref 35–45)
EOSINOPHIL NFR BLD AUTO: 3.9 %
EOSINOPHILS ABSOLUTE: 0.3 THOU/MM3 (ref 0–0.4)
ERYTHROCYTE [DISTWIDTH] IN BLOOD BY AUTOMATED COUNT: 16.7 % (ref 11.5–14.5)
FLUAV RNA RESP QL NAA+PROBE: NOT DETECTED
FLUBV RNA RESP QL NAA+PROBE: NOT DETECTED
GFR SERPL CREATININE-BSD FRML MDRD: 18 ML/MIN/1.73M2
GLUCOSE BLD STRIP.AUTO-MCNC: 131 MG/DL (ref 70–108)
GLUCOSE SERPL-MCNC: 215 MG/DL (ref 70–108)
HCT VFR BLD AUTO: 26.3 % (ref 42–52)
HGB BLD-MCNC: 7.9 GM/DL (ref 14–18)
IMM GRANULOCYTES # BLD AUTO: 0.03 THOU/MM3 (ref 0–0.07)
IMM GRANULOCYTES NFR BLD AUTO: 0.4 %
INR PPP: 3.37 (ref 0.85–1.13)
LIPASE SERPL-CCNC: 58.3 U/L (ref 5.6–51.3)
LYMPHOCYTES ABSOLUTE: 0.5 THOU/MM3 (ref 1–4.8)
LYMPHOCYTES NFR BLD AUTO: 6.1 %
MAGNESIUM SERPL-MCNC: 2.5 MG/DL (ref 1.6–2.4)
MCH RBC QN AUTO: 28.6 PG (ref 26–33)
MCHC RBC AUTO-ENTMCNC: 30 GM/DL (ref 32.2–35.5)
MCV RBC AUTO: 95.3 FL (ref 80–94)
MONOCYTES ABSOLUTE: 0.7 THOU/MM3 (ref 0.4–1.3)
MONOCYTES NFR BLD AUTO: 8.3 %
NEUTROPHILS NFR BLD AUTO: 80.8 %
NRBC BLD AUTO-RTO: 0 /100 WBC
NT-PROBNP SERPL IA-MCNC: ABNORMAL PG/ML (ref 0–124)
OSMOLALITY SERPL CALC.SUM OF ELEC: 289.3 MOSMOL/KG (ref 275–300)
PLATELET # BLD AUTO: 194 THOU/MM3 (ref 130–400)
PMV BLD AUTO: 10.5 FL (ref 9.4–12.4)
POTASSIUM SERPL-SCNC: 4.2 MEQ/L (ref 3.5–5.2)
PROT SERPL-MCNC: 6.4 G/DL (ref 6.1–8)
RBC # BLD AUTO: 2.76 MILL/MM3 (ref 4.7–6.1)
SARS-COV-2 RNA RESP QL NAA+PROBE: NOT DETECTED
SEGMENTED NEUTROPHILS ABSOLUTE COUNT: 6.4 THOU/MM3 (ref 1.8–7.7)
SODIUM SERPL-SCNC: 137 MEQ/L (ref 135–145)
TROPONIN, HIGH SENSITIVITY: 142 NG/L (ref 0–12)
WBC # BLD AUTO: 7.9 THOU/MM3 (ref 4.8–10.8)

## 2024-04-04 PROCEDURE — 93005 ELECTROCARDIOGRAM TRACING: CPT

## 2024-04-04 PROCEDURE — 82948 REAGENT STRIP/BLOOD GLUCOSE: CPT

## 2024-04-04 PROCEDURE — 83690 ASSAY OF LIPASE: CPT

## 2024-04-04 PROCEDURE — 87636 SARSCOV2 & INF A&B AMP PRB: CPT

## 2024-04-04 PROCEDURE — 83735 ASSAY OF MAGNESIUM: CPT

## 2024-04-04 PROCEDURE — G0378 HOSPITAL OBSERVATION PER HR: HCPCS

## 2024-04-04 PROCEDURE — 80053 COMPREHEN METABOLIC PANEL: CPT

## 2024-04-04 PROCEDURE — 93005 ELECTROCARDIOGRAM TRACING: CPT | Performed by: STUDENT IN AN ORGANIZED HEALTH CARE EDUCATION/TRAINING PROGRAM

## 2024-04-04 PROCEDURE — 83880 ASSAY OF NATRIURETIC PEPTIDE: CPT

## 2024-04-04 PROCEDURE — 6360000002 HC RX W HCPCS: Performed by: STUDENT IN AN ORGANIZED HEALTH CARE EDUCATION/TRAINING PROGRAM

## 2024-04-04 PROCEDURE — 36415 COLL VENOUS BLD VENIPUNCTURE: CPT

## 2024-04-04 PROCEDURE — 84484 ASSAY OF TROPONIN QUANT: CPT

## 2024-04-04 PROCEDURE — 71046 X-RAY EXAM CHEST 2 VIEWS: CPT

## 2024-04-04 PROCEDURE — 85046 RETICYTE/HGB CONCENTRATE: CPT

## 2024-04-04 PROCEDURE — 85610 PROTHROMBIN TIME: CPT

## 2024-04-04 PROCEDURE — 74176 CT ABD & PELVIS W/O CONTRAST: CPT

## 2024-04-04 PROCEDURE — 96374 THER/PROPH/DIAG INJ IV PUSH: CPT

## 2024-04-04 PROCEDURE — 82248 BILIRUBIN DIRECT: CPT

## 2024-04-04 PROCEDURE — 93010 ELECTROCARDIOGRAM REPORT: CPT | Performed by: INTERNAL MEDICINE

## 2024-04-04 PROCEDURE — 85025 COMPLETE CBC W/AUTO DIFF WBC: CPT

## 2024-04-04 PROCEDURE — 99285 EMERGENCY DEPT VISIT HI MDM: CPT

## 2024-04-04 RX ORDER — INSULIN GLARGINE 100 [IU]/ML
5 INJECTION, SOLUTION SUBCUTANEOUS NIGHTLY
Status: DISCONTINUED | OUTPATIENT
Start: 2024-04-04 | End: 2024-04-07 | Stop reason: HOSPADM

## 2024-04-04 RX ORDER — POTASSIUM CHLORIDE 7.45 MG/ML
10 INJECTION INTRAVENOUS PRN
Status: DISCONTINUED | OUTPATIENT
Start: 2024-04-04 | End: 2024-04-07 | Stop reason: HOSPADM

## 2024-04-04 RX ORDER — HYDROCODONE BITARTRATE AND ACETAMINOPHEN 5; 325 MG/1; MG/1
2 TABLET ORAL EVERY 4 HOURS PRN
Status: DISCONTINUED | OUTPATIENT
Start: 2024-04-04 | End: 2024-04-05

## 2024-04-04 RX ORDER — INSULIN LISPRO 100 [IU]/ML
0-4 INJECTION, SOLUTION INTRAVENOUS; SUBCUTANEOUS NIGHTLY
Status: DISCONTINUED | OUTPATIENT
Start: 2024-04-05 | End: 2024-04-07 | Stop reason: HOSPADM

## 2024-04-04 RX ORDER — ACETAMINOPHEN 325 MG/1
650 TABLET ORAL EVERY 6 HOURS PRN
Status: DISCONTINUED | OUTPATIENT
Start: 2024-04-04 | End: 2024-04-07 | Stop reason: HOSPADM

## 2024-04-04 RX ORDER — BUMETANIDE 1 MG/1
2 TABLET ORAL DAILY
Status: DISCONTINUED | OUTPATIENT
Start: 2024-04-05 | End: 2024-04-07 | Stop reason: HOSPADM

## 2024-04-04 RX ORDER — ISOSORBIDE MONONITRATE 60 MG/1
120 TABLET, EXTENDED RELEASE ORAL DAILY
Status: DISCONTINUED | OUTPATIENT
Start: 2024-04-05 | End: 2024-04-07

## 2024-04-04 RX ORDER — MAGNESIUM SULFATE IN WATER 40 MG/ML
2000 INJECTION, SOLUTION INTRAVENOUS PRN
Status: DISCONTINUED | OUTPATIENT
Start: 2024-04-04 | End: 2024-04-07 | Stop reason: HOSPADM

## 2024-04-04 RX ORDER — BUPROPION HYDROCHLORIDE 150 MG/1
150 TABLET ORAL EVERY MORNING
Status: DISCONTINUED | OUTPATIENT
Start: 2024-04-05 | End: 2024-04-07 | Stop reason: HOSPADM

## 2024-04-04 RX ORDER — DEXTROSE MONOHYDRATE 100 MG/ML
INJECTION, SOLUTION INTRAVENOUS CONTINUOUS PRN
Status: DISCONTINUED | OUTPATIENT
Start: 2024-04-04 | End: 2024-04-07 | Stop reason: HOSPADM

## 2024-04-04 RX ORDER — FOLIC ACID 1 MG/1
1 TABLET ORAL DAILY
Status: DISCONTINUED | OUTPATIENT
Start: 2024-04-05 | End: 2024-04-07 | Stop reason: HOSPADM

## 2024-04-04 RX ORDER — ALPRAZOLAM 0.5 MG/1
0.5 TABLET ORAL NIGHTLY PRN
Status: DISCONTINUED | OUTPATIENT
Start: 2024-04-04 | End: 2024-04-07 | Stop reason: HOSPADM

## 2024-04-04 RX ORDER — METOPROLOL SUCCINATE 100 MG/1
100 TABLET, EXTENDED RELEASE ORAL DAILY
Status: DISCONTINUED | OUTPATIENT
Start: 2024-04-05 | End: 2024-04-07 | Stop reason: HOSPADM

## 2024-04-04 RX ORDER — IBUPROFEN 600 MG/1
1 TABLET ORAL PRN
Status: DISCONTINUED | OUTPATIENT
Start: 2024-04-04 | End: 2024-04-07 | Stop reason: HOSPADM

## 2024-04-04 RX ORDER — SODIUM CHLORIDE 0.9 % (FLUSH) 0.9 %
10 SYRINGE (ML) INJECTION PRN
Status: DISCONTINUED | OUTPATIENT
Start: 2024-04-04 | End: 2024-04-07 | Stop reason: HOSPADM

## 2024-04-04 RX ORDER — ONDANSETRON 2 MG/ML
4 INJECTION INTRAMUSCULAR; INTRAVENOUS EVERY 6 HOURS PRN
Status: DISCONTINUED | OUTPATIENT
Start: 2024-04-04 | End: 2024-04-07 | Stop reason: HOSPADM

## 2024-04-04 RX ORDER — MEXILETINE HYDROCHLORIDE 150 MG/1
300 CAPSULE ORAL EVERY 8 HOURS SCHEDULED
Status: DISCONTINUED | OUTPATIENT
Start: 2024-04-04 | End: 2024-04-07 | Stop reason: HOSPADM

## 2024-04-04 RX ORDER — SODIUM CHLORIDE 0.9 % (FLUSH) 0.9 %
10 SYRINGE (ML) INJECTION EVERY 12 HOURS SCHEDULED
Status: DISCONTINUED | OUTPATIENT
Start: 2024-04-04 | End: 2024-04-07 | Stop reason: HOSPADM

## 2024-04-04 RX ORDER — HYDRALAZINE HYDROCHLORIDE 50 MG/1
100 TABLET, FILM COATED ORAL 3 TIMES DAILY
Status: DISCONTINUED | OUTPATIENT
Start: 2024-04-04 | End: 2024-04-07 | Stop reason: HOSPADM

## 2024-04-04 RX ORDER — ATORVASTATIN CALCIUM 40 MG/1
40 TABLET, FILM COATED ORAL NIGHTLY
Status: DISCONTINUED | OUTPATIENT
Start: 2024-04-05 | End: 2024-04-07 | Stop reason: HOSPADM

## 2024-04-04 RX ORDER — LANOLIN ALCOHOL/MO/W.PET/CERES
100 CREAM (GRAM) TOPICAL DAILY
Status: DISCONTINUED | OUTPATIENT
Start: 2024-04-05 | End: 2024-04-07 | Stop reason: HOSPADM

## 2024-04-04 RX ORDER — AMIODARONE HYDROCHLORIDE 200 MG/1
200 TABLET ORAL DAILY
Status: DISCONTINUED | OUTPATIENT
Start: 2024-04-05 | End: 2024-04-07 | Stop reason: HOSPADM

## 2024-04-04 RX ORDER — POTASSIUM CHLORIDE 20 MEQ/1
40 TABLET, EXTENDED RELEASE ORAL PRN
Status: DISCONTINUED | OUTPATIENT
Start: 2024-04-04 | End: 2024-04-07 | Stop reason: HOSPADM

## 2024-04-04 RX ORDER — MORPHINE SULFATE 4 MG/ML
4 INJECTION, SOLUTION INTRAMUSCULAR; INTRAVENOUS ONCE
Status: COMPLETED | OUTPATIENT
Start: 2024-04-04 | End: 2024-04-04

## 2024-04-04 RX ORDER — ACETAMINOPHEN 650 MG/1
650 SUPPOSITORY RECTAL EVERY 6 HOURS PRN
Status: DISCONTINUED | OUTPATIENT
Start: 2024-04-04 | End: 2024-04-07 | Stop reason: HOSPADM

## 2024-04-04 RX ORDER — HYDROCODONE BITARTRATE AND ACETAMINOPHEN 5; 325 MG/1; MG/1
1 TABLET ORAL EVERY 4 HOURS PRN
Status: DISCONTINUED | OUTPATIENT
Start: 2024-04-04 | End: 2024-04-05

## 2024-04-04 RX ORDER — SODIUM CHLORIDE 9 MG/ML
INJECTION, SOLUTION INTRAVENOUS PRN
Status: DISCONTINUED | OUTPATIENT
Start: 2024-04-04 | End: 2024-04-07 | Stop reason: HOSPADM

## 2024-04-04 RX ORDER — POLYETHYLENE GLYCOL 3350 17 G/17G
17 POWDER, FOR SOLUTION ORAL DAILY PRN
Status: DISCONTINUED | OUTPATIENT
Start: 2024-04-04 | End: 2024-04-07 | Stop reason: HOSPADM

## 2024-04-04 RX ORDER — INSULIN LISPRO 100 [IU]/ML
0-4 INJECTION, SOLUTION INTRAVENOUS; SUBCUTANEOUS
Status: DISCONTINUED | OUTPATIENT
Start: 2024-04-05 | End: 2024-04-07 | Stop reason: HOSPADM

## 2024-04-04 RX ORDER — ONDANSETRON 4 MG/1
4 TABLET, ORALLY DISINTEGRATING ORAL EVERY 8 HOURS PRN
Status: DISCONTINUED | OUTPATIENT
Start: 2024-04-04 | End: 2024-04-07 | Stop reason: HOSPADM

## 2024-04-04 RX ADMIN — MORPHINE SULFATE 4 MG: 4 INJECTION, SOLUTION INTRAMUSCULAR; INTRAVENOUS at 20:52

## 2024-04-04 ASSESSMENT — PAIN DESCRIPTION - ORIENTATION
ORIENTATION: UPPER;LEFT
ORIENTATION: MID

## 2024-04-04 ASSESSMENT — PAIN DESCRIPTION - LOCATION
LOCATION: ABDOMEN
LOCATION: ABDOMEN;LEG

## 2024-04-04 ASSESSMENT — PAIN DESCRIPTION - DESCRIPTORS
DESCRIPTORS: DISCOMFORT
DESCRIPTORS: CRAMPING;SHARP

## 2024-04-04 ASSESSMENT — PAIN SCALES - GENERAL
PAINLEVEL_OUTOF10: 7
PAINLEVEL_OUTOF10: 7
PAINLEVEL_OUTOF10: 8

## 2024-04-04 ASSESSMENT — PAIN - FUNCTIONAL ASSESSMENT: PAIN_FUNCTIONAL_ASSESSMENT: 0-10

## 2024-04-05 ENCOUNTER — APPOINTMENT (OUTPATIENT)
Dept: CT IMAGING | Age: 59
DRG: 204 | End: 2024-04-05
Payer: COMMERCIAL

## 2024-04-05 PROBLEM — D64.9 ANEMIA, UNSPECIFIED TYPE: Status: ACTIVE | Noted: 2024-04-05

## 2024-04-05 LAB
ANION GAP SERPL CALC-SCNC: 13 MEQ/L (ref 8–16)
BUN SERPL-MCNC: 37 MG/DL (ref 7–22)
CA-I BLD ISE-SCNC: 1.19 MMOL/L (ref 1.12–1.32)
CALCIUM SERPL-MCNC: 8.3 MG/DL (ref 8.5–10.5)
CHLORIDE 24H UR-SRATE: 25 MEQ/L
CHLORIDE SERPL-SCNC: 105 MEQ/L (ref 98–111)
CO2 SERPL-SCNC: 21 MEQ/L (ref 23–33)
CREAT SERPL-MCNC: 3.6 MG/DL (ref 0.4–1.2)
CREAT UR-MCNC: 62.6 MG/DL
DEPRECATED RDW RBC AUTO: 58 FL (ref 35–45)
ERYTHROCYTE [DISTWIDTH] IN BLOOD BY AUTOMATED COUNT: 16.7 % (ref 11.5–14.5)
ETHANOL SERPL-MCNC: < 0.01 %
FERRITIN SERPL IA-MCNC: 67 NG/ML (ref 22–322)
FOLATE SERPL-MCNC: > 20 NG/ML (ref 4.8–24.2)
GFR SERPL CREATININE-BSD FRML MDRD: 19 ML/MIN/1.73M2
GLUCOSE BLD STRIP.AUTO-MCNC: 146 MG/DL (ref 70–108)
GLUCOSE BLD STRIP.AUTO-MCNC: 162 MG/DL (ref 70–108)
GLUCOSE BLD STRIP.AUTO-MCNC: 176 MG/DL (ref 70–108)
GLUCOSE BLD STRIP.AUTO-MCNC: 226 MG/DL (ref 70–108)
GLUCOSE SERPL-MCNC: 106 MG/DL (ref 70–108)
HCT VFR BLD AUTO: 26.3 % (ref 42–52)
HGB BLD-MCNC: 7.9 GM/DL (ref 14–18)
HGB RETIC QN AUTO: 25.6 PG (ref 28.2–35.7)
IMM RETICS NFR: 26 % (ref 2.3–13.4)
IRON SATN MFR SERPL: 12 % (ref 20–50)
IRON SERPL-MCNC: 32 UG/DL (ref 65–195)
LACTATE SERPL-SCNC: 0.4 MMOL/L (ref 0.5–2)
LDH SERPL L TO P-CCNC: 213 U/L (ref 100–190)
MCH RBC QN AUTO: 28.6 PG (ref 26–33)
MCHC RBC AUTO-ENTMCNC: 30 GM/DL (ref 32.2–35.5)
MCV RBC AUTO: 95.3 FL (ref 80–94)
PLATELET # BLD AUTO: 192 THOU/MM3 (ref 130–400)
PMV BLD AUTO: 10.2 FL (ref 9.4–12.4)
POTASSIUM SERPL-SCNC: 4.3 MEQ/L (ref 3.5–5.2)
POTASSIUM UR-SCNC: 23.6 MEQ/L
RBC # BLD AUTO: 2.76 MILL/MM3 (ref 4.7–6.1)
RETICS # AUTO: 75 THOU/MM3 (ref 20–115)
RETICS/RBC NFR AUTO: 2.7 % (ref 0.5–2)
REVIEWED BY: NORMAL
SMEAR REVIEW: NORMAL
SODIUM SERPL-SCNC: 139 MEQ/L (ref 135–145)
SODIUM UR-SCNC: 43 MEQ/L
TIBC SERPL-MCNC: 273 UG/DL (ref 171–450)
TROPONIN, HIGH SENSITIVITY: 160 NG/L (ref 0–12)
UUN 24H UR-MCNC: 399 MG/DL
VIT B12 SERPL-MCNC: 347 PG/ML (ref 211–911)
WBC # BLD AUTO: 7.1 THOU/MM3 (ref 4.8–10.8)

## 2024-04-05 PROCEDURE — 84300 ASSAY OF URINE SODIUM: CPT

## 2024-04-05 PROCEDURE — 80048 BASIC METABOLIC PNL TOTAL CA: CPT

## 2024-04-05 PROCEDURE — 83550 IRON BINDING TEST: CPT

## 2024-04-05 PROCEDURE — 6360000002 HC RX W HCPCS

## 2024-04-05 PROCEDURE — 85027 COMPLETE CBC AUTOMATED: CPT

## 2024-04-05 PROCEDURE — 82728 ASSAY OF FERRITIN: CPT

## 2024-04-05 PROCEDURE — 2580000003 HC RX 258

## 2024-04-05 PROCEDURE — 6370000000 HC RX 637 (ALT 250 FOR IP)

## 2024-04-05 PROCEDURE — 71250 CT THORAX DX C-: CPT

## 2024-04-05 PROCEDURE — 83605 ASSAY OF LACTIC ACID: CPT

## 2024-04-05 PROCEDURE — 84540 ASSAY OF URINE/UREA-N: CPT

## 2024-04-05 PROCEDURE — 82077 ASSAY SPEC XCP UR&BREATH IA: CPT

## 2024-04-05 PROCEDURE — 82607 VITAMIN B-12: CPT

## 2024-04-05 PROCEDURE — 99223 1ST HOSP IP/OBS HIGH 75: CPT | Performed by: STUDENT IN AN ORGANIZED HEALTH CARE EDUCATION/TRAINING PROGRAM

## 2024-04-05 PROCEDURE — 82570 ASSAY OF URINE CREATININE: CPT

## 2024-04-05 PROCEDURE — 82436 ASSAY OF URINE CHLORIDE: CPT

## 2024-04-05 PROCEDURE — 83540 ASSAY OF IRON: CPT

## 2024-04-05 PROCEDURE — 82948 REAGENT STRIP/BLOOD GLUCOSE: CPT

## 2024-04-05 PROCEDURE — 93010 ELECTROCARDIOGRAM REPORT: CPT | Performed by: INTERNAL MEDICINE

## 2024-04-05 PROCEDURE — 84484 ASSAY OF TROPONIN QUANT: CPT

## 2024-04-05 PROCEDURE — 84133 ASSAY OF URINE POTASSIUM: CPT

## 2024-04-05 PROCEDURE — 82330 ASSAY OF CALCIUM: CPT

## 2024-04-05 PROCEDURE — 6370000000 HC RX 637 (ALT 250 FOR IP): Performed by: STUDENT IN AN ORGANIZED HEALTH CARE EDUCATION/TRAINING PROGRAM

## 2024-04-05 PROCEDURE — 83615 LACTATE (LD) (LDH) ENZYME: CPT

## 2024-04-05 PROCEDURE — 1200000003 HC TELEMETRY R&B

## 2024-04-05 PROCEDURE — 82746 ASSAY OF FOLIC ACID SERUM: CPT

## 2024-04-05 PROCEDURE — 96375 TX/PRO/DX INJ NEW DRUG ADDON: CPT

## 2024-04-05 PROCEDURE — 36415 COLL VENOUS BLD VENIPUNCTURE: CPT

## 2024-04-05 RX ORDER — SENNOSIDES A AND B 8.6 MG/1
2 TABLET, FILM COATED ORAL 2 TIMES DAILY
Status: DISCONTINUED | OUTPATIENT
Start: 2024-04-05 | End: 2024-04-07 | Stop reason: HOSPADM

## 2024-04-05 RX ORDER — HYDROCODONE BITARTRATE AND ACETAMINOPHEN 5; 325 MG/1; MG/1
1 TABLET ORAL EVERY 4 HOURS PRN
Status: DISCONTINUED | OUTPATIENT
Start: 2024-04-05 | End: 2024-04-06

## 2024-04-05 RX ORDER — MULTIVITAMIN WITH IRON
1 TABLET ORAL DAILY
Status: DISCONTINUED | OUTPATIENT
Start: 2024-04-05 | End: 2024-04-07 | Stop reason: HOSPADM

## 2024-04-05 RX ORDER — MORPHINE SULFATE 2 MG/ML
2 INJECTION, SOLUTION INTRAMUSCULAR; INTRAVENOUS EVERY 4 HOURS PRN
Status: DISCONTINUED | OUTPATIENT
Start: 2024-04-05 | End: 2024-04-05

## 2024-04-05 RX ORDER — FERROUS SULFATE 325(65) MG
325 TABLET ORAL EVERY OTHER DAY
Status: DISCONTINUED | OUTPATIENT
Start: 2024-04-06 | End: 2024-04-07 | Stop reason: HOSPADM

## 2024-04-05 RX ORDER — PANTOPRAZOLE SODIUM 40 MG/1
40 TABLET, DELAYED RELEASE ORAL
Status: DISCONTINUED | OUTPATIENT
Start: 2024-04-06 | End: 2024-04-07 | Stop reason: HOSPADM

## 2024-04-05 RX ADMIN — SODIUM CHLORIDE, PRESERVATIVE FREE 10 ML: 5 INJECTION INTRAVENOUS at 00:24

## 2024-04-05 RX ADMIN — MEXILETINE HYDROCHLORIDE 300 MG: 150 CAPSULE ORAL at 13:53

## 2024-04-05 RX ADMIN — HYDROCODONE BITARTRATE AND ACETAMINOPHEN 2 TABLET: 5; 325 TABLET ORAL at 05:58

## 2024-04-05 RX ADMIN — HYDROCODONE BITARTRATE AND ACETAMINOPHEN 1 TABLET: 5; 325 TABLET ORAL at 20:44

## 2024-04-05 RX ADMIN — MEXILETINE HYDROCHLORIDE 300 MG: 150 CAPSULE ORAL at 20:44

## 2024-04-05 RX ADMIN — SODIUM CHLORIDE 100 MG: 9 INJECTION, SOLUTION INTRAVENOUS at 15:15

## 2024-04-05 RX ADMIN — Medication 1 TABLET: at 20:44

## 2024-04-05 RX ADMIN — FOLIC ACID 1 MG: 1 TABLET ORAL at 08:27

## 2024-04-05 RX ADMIN — HYDROCODONE BITARTRATE AND ACETAMINOPHEN 2 TABLET: 5; 325 TABLET ORAL at 10:16

## 2024-04-05 RX ADMIN — HYDRALAZINE HYDROCHLORIDE 100 MG: 50 TABLET ORAL at 00:24

## 2024-04-05 RX ADMIN — SENNOSIDES 17.2 MG: 8.6 TABLET, FILM COATED ORAL at 20:44

## 2024-04-05 RX ADMIN — ALPRAZOLAM 0.5 MG: 0.5 TABLET ORAL at 00:23

## 2024-04-05 RX ADMIN — ATORVASTATIN CALCIUM 40 MG: 40 TABLET, FILM COATED ORAL at 20:44

## 2024-04-05 RX ADMIN — HYDROCODONE BITARTRATE AND ACETAMINOPHEN 2 TABLET: 5; 325 TABLET ORAL at 00:23

## 2024-04-05 RX ADMIN — INSULIN GLARGINE 5 UNITS: 100 INJECTION, SOLUTION SUBCUTANEOUS at 00:23

## 2024-04-05 RX ADMIN — SODIUM CHLORIDE, PRESERVATIVE FREE 10 ML: 5 INJECTION INTRAVENOUS at 20:44

## 2024-04-05 RX ADMIN — MORPHINE SULFATE 2 MG: 2 INJECTION, SOLUTION INTRAMUSCULAR; INTRAVENOUS at 04:10

## 2024-04-05 RX ADMIN — BUMETANIDE 2 MG: 1 TABLET ORAL at 08:27

## 2024-04-05 RX ADMIN — MEXILETINE HYDROCHLORIDE 300 MG: 150 CAPSULE ORAL at 05:58

## 2024-04-05 RX ADMIN — INSULIN GLARGINE 5 UNITS: 100 INJECTION, SOLUTION SUBCUTANEOUS at 20:44

## 2024-04-05 RX ADMIN — Medication 100 MG: at 08:27

## 2024-04-05 RX ADMIN — HYDROCODONE BITARTRATE AND ACETAMINOPHEN 1 TABLET: 5; 325 TABLET ORAL at 15:15

## 2024-04-05 RX ADMIN — SODIUM CHLORIDE, PRESERVATIVE FREE 10 ML: 5 INJECTION INTRAVENOUS at 08:27

## 2024-04-05 RX ADMIN — BUPROPION HYDROCHLORIDE 150 MG: 150 TABLET, EXTENDED RELEASE ORAL at 08:27

## 2024-04-05 RX ADMIN — MEXILETINE HYDROCHLORIDE 300 MG: 150 CAPSULE ORAL at 00:24

## 2024-04-05 ASSESSMENT — PAIN DESCRIPTION - LOCATION
LOCATION: ABDOMEN;LEG

## 2024-04-05 ASSESSMENT — PAIN SCALES - GENERAL
PAINLEVEL_OUTOF10: 8
PAINLEVEL_OUTOF10: 7
PAINLEVEL_OUTOF10: 10
PAINLEVEL_OUTOF10: 3
PAINLEVEL_OUTOF10: 0
PAINLEVEL_OUTOF10: 8

## 2024-04-05 ASSESSMENT — PAIN DESCRIPTION - FREQUENCY
FREQUENCY: CONTINUOUS

## 2024-04-05 ASSESSMENT — PAIN DESCRIPTION - ORIENTATION
ORIENTATION: LEFT;RIGHT
ORIENTATION: LEFT;LOWER
ORIENTATION: LEFT;LOWER
ORIENTATION: LEFT

## 2024-04-05 ASSESSMENT — PAIN - FUNCTIONAL ASSESSMENT
PAIN_FUNCTIONAL_ASSESSMENT: ACTIVITIES ARE NOT PREVENTED

## 2024-04-05 ASSESSMENT — PAIN DESCRIPTION - DESCRIPTORS
DESCRIPTORS: SHARP
DESCRIPTORS: SHARP
DESCRIPTORS: THROBBING;SHARP
DESCRIPTORS: SHARP

## 2024-04-05 ASSESSMENT — PAIN SCALES - WONG BAKER
WONGBAKER_NUMERICALRESPONSE: NO HURT
WONGBAKER_NUMERICALRESPONSE: NO HURT

## 2024-04-05 ASSESSMENT — PAIN DESCRIPTION - ONSET
ONSET: ON-GOING

## 2024-04-05 ASSESSMENT — PAIN DESCRIPTION - PAIN TYPE
TYPE: ACUTE PAIN

## 2024-04-05 NOTE — CARE COORDINATION
04/05/24 1135   Readmission Assessment   Number of Days since last admission? 8-30 days   Previous Disposition Home Alone   Who is being Interviewed Patient   What was the patient's/caregiver's perception as to why they think they needed to return back to the hospital? Other (Comment)  (short of breath with activity, decreased endurance)   Did you visit your Primary Care Physician after you left the hospital, before you returned this time? Yes   Did you see a specialist, such as Cardiac, Pulmonary, Orthopedic Physician, etc. after you left the hospital? Yes   Who advised the patient to return to the hospital? Self-referral   Does the patient report anything that got in the way of taking their medications? No   In our efforts to provide the best possible care to you and others like you, can you think of anything that we could have done to help you after you left the hospital the first time, so that you might not have needed to return so soon? Other (Comment)  (\"no, just feels so exhausted and worn out\". Declines HH and OP therapy.)

## 2024-04-05 NOTE — ED PROVIDER NOTES
not feel a evaluation for pulmonary embolus is indicated in this case as the patient has elevated INR due to Coumadin anticoagulation and has not experienced any hypoxia.  Symptoms can be explained by the patient's hemoglobin and pulmonary edema. [EM]      ED Course User Index  [EM] Jose G Aviles DO     Vitals Reviewed:    Vitals:    04/04/24 2016 04/04/24 2049 04/04/24 2136   BP: (!) 149/76 (!) 151/76 (!) 148/76   Pulse: 58 62 59   Resp: 20 18 19   Temp: 98.1 °F (36.7 °C)     TempSrc: Oral     SpO2: 95% 97% 93%   Weight: 122.5 kg (270 lb)     Height: 1.88 m (6' 2\")         The patient was seen and examined. Appropriate diagnostic testing was performed and results reviewed with the patient and/or caregivers.    The results of pertinent diagnostic studies and exam findings were discussed. The patient’s provisional diagnosis and plan of care were discussed with the patient and present caregivers who expressed understanding. Any medications were reviewed and indications and risks of medications were discussed. Strict verbal and written return precautions, instructions and appropriate follow-up were provided to the patient.     ED Medications administered this visit:  (None if blank)  Medications   morphine (PF) injection 4 mg (4 mg IntraVENous Given 4/4/24 2052)       PROCEDURES: (None if blank)  Procedures:     CRITICAL CARE: (None if blank)    DISCHARGE PRESCRIPTIONS: (None if blank)  New Prescriptions    No medications on file         FINAL IMPRESSION     Final diagnoses:   Fatigue, unspecified type   Symptomatic anemia   Acute on chronic congestive heart failure, unspecified heart failure type (HCC)     1. Fatigue, unspecified type    2. Symptomatic anemia    3. Acute on chronic congestive heart failure, unspecified heart failure type (HCC)        DISPOSITION / PLAN   DISPOSITION Decision To Admit 04/04/2024 09:41:24 PM      OUTPATIENT FOLLOW UP THE PATIENT:  No follow-up provider specified.      This

## 2024-04-05 NOTE — ED NOTES
ED to inpatient nurses report      Chief Complaint:  Chief Complaint   Patient presents with    Fatigue    Hemoptysis     Present to ED from: home    MOA:     LOC: alert and orientated to name, place, date  Mobility: Independent  Oxygen Baseline: room air    Current needs required: room air     Code Status:   Prior    What abnormal results were found and what did you give/do to treat them?   Any procedures or intervention occur?     Mental Status:  Level of Consciousness: Alert (0)    Psych Assessment:        Vitals:  Patient Vitals for the past 24 hrs:   BP Temp Temp src Pulse Resp SpO2 Height Weight   04/04/24 2228 (!) 148/72 -- -- 62 16 94 % -- --   04/04/24 2136 (!) 148/76 -- -- 59 19 93 % -- --   04/04/24 2049 (!) 151/76 -- -- 62 18 97 % -- --   04/04/24 2016 (!) 149/76 98.1 °F (36.7 °C) Oral 58 20 95 % 1.88 m (6' 2\") 122.5 kg (270 lb)        LDAs:   Peripheral IV 04/04/24 Left Antecubital (Active)   Site Assessment Clean, dry & intact 04/04/24 2228   Line Status Normal saline locked 04/04/24 2228   Line Care Connections checked and tightened 04/04/24 2228   Phlebitis Assessment No symptoms 04/04/24 2228   Infiltration Assessment 0 04/04/24 2228   Dressing Status Clean, dry & intact 04/04/24 2228   Dressing Type Transparent 04/04/24 2228       Ambulatory Status:  No data recorded    Diagnosis:  DISPOSITION Admitted 04/04/2024 10:41:46 PM   Final diagnoses:   Fatigue, unspecified type   Symptomatic anemia   Acute on chronic congestive heart failure, unspecified heart failure type (HCC)        Consults:  None     Pain Score:  Pain Assessment  Pain Assessment: 0-10  Pain Level: 7  Patient's Stated Pain Goal: 4  Pain Location: Abdomen  Pain Orientation: Mid  Pain Descriptors: Cramping, Sharp    C-SSRS:   Risk of Suicide: No Risk    Sepsis Screening:  Sepsis Risk Score: 2.39    [place] Fall Risk:       Swallow Screening        Preferred Language:   English      ALLERGIES     Latex, Ativan [lorazepam], Pcn

## 2024-04-05 NOTE — ED TRIAGE NOTES
Pt presents to ED with chief complaint of fatigue and hemoptysis. Pt states he has been fatigued and unable to walk father than a couple feet before requiring rest. States in the past couple of days has noticed blood in his phlegm; on coumadin.

## 2024-04-05 NOTE — PLAN OF CARE
Problem: Discharge Planning  Goal: Discharge to home or other facility with appropriate resources  4/5/2024 1009 by Adelina Kerr RN  Outcome: Progressing  Flowsheets (Taken 4/5/2024 1009)  Discharge to home or other facility with appropriate resources: Identify barriers to discharge with patient and caregiver     Problem: Pain  Goal: Verbalizes/displays adequate comfort level or baseline comfort level  4/5/2024 1009 by Adelina Kerr RN  Outcome: Progressing  Flowsheets (Taken 4/5/2024 1009)  Verbalizes/displays adequate comfort level or baseline comfort level: Encourage patient to monitor pain and request assistance     Problem: Safety - Adult  Goal: Free from fall injury  Outcome: Progressing  Flowsheets (Taken 4/5/2024 1009)  Free From Fall Injury: Instruct family/caregiver on patient safety     Problem: Chronic Conditions and Co-morbidities  Goal: Patient's chronic conditions and co-morbidity symptoms are monitored and maintained or improved  Outcome: Progressing  Flowsheets (Taken 4/5/2024 1009)  Care Plan - Patient's Chronic Conditions and Co-Morbidity Symptoms are Monitored and Maintained or Improved:   Monitor and assess patient's chronic conditions and comorbid symptoms for stability, deterioration, or improvement   Collaborate with multidisciplinary team to address chronic and comorbid conditions and prevent exacerbation or deterioration   Update acute care plan with appropriate goals if chronic or comorbid symptoms are exacerbated and prevent overall improvement and discharge   Care plan reviewed with patient.  Patient verbalizes understanding of the plan of care and contribute to goal setting.

## 2024-04-05 NOTE — CARE COORDINATION
Case Management Assessment  Initial Evaluation    Date/Time of Evaluation: 4/5/2024 11:31 AM  Assessment Completed by: Armand Armendariz RN    If patient is discharged prior to next notation, then this note serves as note for discharge by case management.    Patient Name: Garrett Duncan                   YOB: 1965  Diagnosis: Shortness of breath [R06.02]  Symptomatic anemia [D64.9]  Fatigue, unspecified type [R53.83]  Acute on chronic congestive heart failure, unspecified heart failure type (HCC) [I50.9]                   Date / Time: 4/4/2024  8:09 PM  Location: [facility]19/019-     Patient Admission Status: Observation   Readmission Risk Low 0-14, Mod 15-19), High > 20: Readmission Risk Score: 31.7    Current PCP: Leonel Adorno APRN - CNP  PCP verified by CM? Yes    Chart Reviewed: Yes      History Provided by: Patient  Patient Orientation: Alert and Oriented    Patient Cognition: Alert    Hospitalization in the last 30 days (Readmission):  Yes    If yes, Readmission Assessment in CM Navigator will be completed.    Advance Directives:      Code Status: Full Code   Patient's Primary Decision Maker is: Named in Scanned ACP Document    Primary Decision Maker: Jesica Duncan (Jose) - Child - [phone]    Secondary Decision Maker: Lou Duncan - Child - [phone]    Discharge Planning:    Patient lives with: Alone Type of Home: Apartment  Primary Care Giver: Self  Patient Support Systems include: Children, Family Members   Current Financial resources: Other (Comment) (commercial)  Current community resources: None  Current services prior to admission: Durable Medical Equipment            Current DME: Walker            Type of Home Care services:  None    ADLS  Prior functional level: Independent in ADLs/IADLs  Current functional level: Independent in ADLs/IADLs    Family can provide assistance at DC: Yes  Would you like Case Management to discuss the discharge plan with any other family members/significant

## 2024-04-05 NOTE — H&P
Osmolality Calc 289.3 275.0 - 300.0 mOsmol/kg   Glomerular Filtration Rate, Estimated   Result Value Ref Range    Est, Glom Filt Rate 18 (A) >60 ml/min/1.73m2   Reticulocytes   Result Value Ref Range    Retic Ct Abs 2.7 (H) 0.5 - 2.0 %    Absolute Retic # 75.0 20.0 - 115.0 thou/mm3    Immature Retic Fract 26.0 (H) 2.3 - 13.4 %    Retic Hemoglobin 25.6 (L) 28.2 - 35.7 pg   POCT glucose   Result Value Ref Range    POC Glucose 131 (H) 70 - 108 mg/dl   EKG 12 Lead   Result Value Ref Range    Ventricular Rate 63 BPM    Atrial Rate 63 BPM    P-R Interval 216 ms    QRS Duration 100 ms    Q-T Interval 486 ms    QTc Calculation (Bazett) 497 ms    P Axis 71 degrees    R Axis 7 degrees    T Axis 108 degrees   EKG 12 Lead   Result Value Ref Range    Ventricular Rate 56 BPM    Atrial Rate 56 BPM    P-R Interval 232 ms    QRS Duration 98 ms    Q-T Interval 472 ms    QTc Calculation (Bazett) 455 ms    R Axis 5 degrees    T Axis 129 degrees       Diagnostics:  CT ABDOMEN PELVIS WO CONTRAST Additional Contrast? None    Result Date: 4/4/2024  CT abdomen and pelvis without contrast Comparison: CT/KO/SR - CT ABDOMEN PELVIS WO CONTRAST - 02/29/2024 11:34 AM EST Findings: There are minimal bilateral pleural effusions. There is bilateral smooth interlobular septal line thickening and ground-glass opacities in the visualized lower lungs consistent with mild pulmonary edema. There is mild cardiomegaly. There is cholelithiasis. Gallbladder is contracted. The liver, pancreas, and spleen are unremarkable. There is unchanged mild thickening of the adrenal glands without a discrete nodule. There are several small bilateral renal cysts. The appendix is normal. There is a moderate amount of stool throughout the colon. There is no bowel obstruction. There are small bilateral fat containing inguinal hernias and small fat containing umbilical hernia. Aorta is normal diameter. There are no enlarged lymph nodes. There is no fracture or suspicious

## 2024-04-06 PROBLEM — R53.83 FATIGUE: Status: ACTIVE | Noted: 2024-04-06

## 2024-04-06 PROBLEM — D64.9 SYMPTOMATIC ANEMIA: Status: ACTIVE | Noted: 2024-04-06

## 2024-04-06 LAB
ALBUMIN SERPL BCG-MCNC: 3.5 G/DL (ref 3.5–5.1)
ALP SERPL-CCNC: 102 U/L (ref 38–126)
ALT SERPL W/O P-5'-P-CCNC: 8 U/L (ref 11–66)
ANION GAP SERPL CALC-SCNC: 13 MEQ/L (ref 8–16)
AST SERPL-CCNC: 13 U/L (ref 5–40)
BASOPHILS ABSOLUTE: 0.1 THOU/MM3 (ref 0–0.1)
BASOPHILS NFR BLD AUTO: 1 %
BILIRUB SERPL-MCNC: 0.4 MG/DL (ref 0.3–1.2)
BUN SERPL-MCNC: 38 MG/DL (ref 7–22)
CA-I BLD ISE-SCNC: 1.16 MMOL/L (ref 1.12–1.32)
CALCIUM SERPL-MCNC: 8.3 MG/DL (ref 8.5–10.5)
CHLORIDE SERPL-SCNC: 106 MEQ/L (ref 98–111)
CO2 SERPL-SCNC: 19 MEQ/L (ref 23–33)
CREAT SERPL-MCNC: 3.8 MG/DL (ref 0.4–1.2)
DEPRECATED MEAN GLUCOSE BLD GHB EST-ACNC: 156 MG/DL (ref 70–126)
DEPRECATED RDW RBC AUTO: 58.8 FL (ref 35–45)
EOSINOPHIL NFR BLD AUTO: 5.8 %
EOSINOPHILS ABSOLUTE: 0.3 THOU/MM3 (ref 0–0.4)
ERYTHROCYTE [DISTWIDTH] IN BLOOD BY AUTOMATED COUNT: 16.7 % (ref 11.5–14.5)
GFR SERPL CREATININE-BSD FRML MDRD: 17 ML/MIN/1.73M2
GLUCOSE BLD STRIP.AUTO-MCNC: 128 MG/DL (ref 70–108)
GLUCOSE BLD STRIP.AUTO-MCNC: 143 MG/DL (ref 70–108)
GLUCOSE BLD STRIP.AUTO-MCNC: 173 MG/DL (ref 70–108)
GLUCOSE BLD STRIP.AUTO-MCNC: 191 MG/DL (ref 70–108)
GLUCOSE SERPL-MCNC: 105 MG/DL (ref 70–108)
HBA1C MFR BLD HPLC: 7.2 % (ref 4.4–6.4)
HCT VFR BLD AUTO: 27.9 % (ref 42–52)
HGB BLD-MCNC: 8.3 GM/DL (ref 14–18)
IMM GRANULOCYTES # BLD AUTO: 0.03 THOU/MM3 (ref 0–0.07)
IMM GRANULOCYTES NFR BLD AUTO: 0.5 %
INR PPP: 4.21 (ref 0.85–1.13)
LYMPHOCYTES ABSOLUTE: 0.7 THOU/MM3 (ref 1–4.8)
LYMPHOCYTES NFR BLD AUTO: 11.7 %
MAGNESIUM SERPL-MCNC: 2.7 MG/DL (ref 1.6–2.4)
MCH RBC QN AUTO: 28.8 PG (ref 26–33)
MCHC RBC AUTO-ENTMCNC: 29.7 GM/DL (ref 32.2–35.5)
MCV RBC AUTO: 96.9 FL (ref 80–94)
MONOCYTES ABSOLUTE: 0.6 THOU/MM3 (ref 0.4–1.3)
MONOCYTES NFR BLD AUTO: 10 %
NEUTROPHILS NFR BLD AUTO: 71 %
NRBC BLD AUTO-RTO: 0 /100 WBC
PLATELET # BLD AUTO: 184 THOU/MM3 (ref 130–400)
PMV BLD AUTO: 10.2 FL (ref 9.4–12.4)
POTASSIUM SERPL-SCNC: 4.7 MEQ/L (ref 3.5–5.2)
PROT SERPL-MCNC: 6.4 G/DL (ref 6.1–8)
RBC # BLD AUTO: 2.88 MILL/MM3 (ref 4.7–6.1)
SEGMENTED NEUTROPHILS ABSOLUTE COUNT: 4.1 THOU/MM3 (ref 1.8–7.7)
SODIUM SERPL-SCNC: 138 MEQ/L (ref 135–145)
T4 FREE SERPL-MCNC: 1.29 NG/DL (ref 0.93–1.68)
TSH SERPL DL<=0.005 MIU/L-ACNC: 6.13 UIU/ML (ref 0.4–4.2)
WBC # BLD AUTO: 5.8 THOU/MM3 (ref 4.8–10.8)

## 2024-04-06 PROCEDURE — 36415 COLL VENOUS BLD VENIPUNCTURE: CPT

## 2024-04-06 PROCEDURE — 85025 COMPLETE CBC W/AUTO DIFF WBC: CPT

## 2024-04-06 PROCEDURE — 83735 ASSAY OF MAGNESIUM: CPT

## 2024-04-06 PROCEDURE — 2580000003 HC RX 258

## 2024-04-06 PROCEDURE — 6370000000 HC RX 637 (ALT 250 FOR IP): Performed by: STUDENT IN AN ORGANIZED HEALTH CARE EDUCATION/TRAINING PROGRAM

## 2024-04-06 PROCEDURE — 6370000000 HC RX 637 (ALT 250 FOR IP)

## 2024-04-06 PROCEDURE — 99232 SBSQ HOSP IP/OBS MODERATE 35: CPT | Performed by: STUDENT IN AN ORGANIZED HEALTH CARE EDUCATION/TRAINING PROGRAM

## 2024-04-06 PROCEDURE — 84443 ASSAY THYROID STIM HORMONE: CPT

## 2024-04-06 PROCEDURE — 84439 ASSAY OF FREE THYROXINE: CPT

## 2024-04-06 PROCEDURE — 82330 ASSAY OF CALCIUM: CPT

## 2024-04-06 PROCEDURE — 83036 HEMOGLOBIN GLYCOSYLATED A1C: CPT

## 2024-04-06 PROCEDURE — 1200000003 HC TELEMETRY R&B

## 2024-04-06 PROCEDURE — 80053 COMPREHEN METABOLIC PANEL: CPT

## 2024-04-06 PROCEDURE — 6360000002 HC RX W HCPCS

## 2024-04-06 PROCEDURE — 85610 PROTHROMBIN TIME: CPT

## 2024-04-06 PROCEDURE — 82948 REAGENT STRIP/BLOOD GLUCOSE: CPT

## 2024-04-06 RX ORDER — HYDROXYZINE HYDROCHLORIDE 25 MG/1
25 TABLET, FILM COATED ORAL 2 TIMES DAILY PRN
Status: DISCONTINUED | OUTPATIENT
Start: 2024-04-06 | End: 2024-04-07 | Stop reason: HOSPADM

## 2024-04-06 RX ORDER — HYDROXYZINE HYDROCHLORIDE 25 MG/1
25 TABLET, FILM COATED ORAL NIGHTLY
Status: DISCONTINUED | OUTPATIENT
Start: 2024-04-06 | End: 2024-04-07 | Stop reason: HOSPADM

## 2024-04-06 RX ORDER — HYDROCODONE BITARTRATE AND ACETAMINOPHEN 5; 325 MG/1; MG/1
1 TABLET ORAL EVERY 6 HOURS PRN
Status: DISCONTINUED | OUTPATIENT
Start: 2024-04-06 | End: 2024-04-07 | Stop reason: HOSPADM

## 2024-04-06 RX ORDER — PHYTONADIONE 5 MG/1
5 TABLET ORAL ONCE
Status: COMPLETED | OUTPATIENT
Start: 2024-04-06 | End: 2024-04-06

## 2024-04-06 RX ADMIN — SENNOSIDES 17.2 MG: 8.6 TABLET, FILM COATED ORAL at 21:41

## 2024-04-06 RX ADMIN — MEXILETINE HYDROCHLORIDE 300 MG: 150 CAPSULE ORAL at 21:36

## 2024-04-06 RX ADMIN — HYDROCODONE BITARTRATE AND ACETAMINOPHEN 1 TABLET: 5; 325 TABLET ORAL at 05:48

## 2024-04-06 RX ADMIN — SENNOSIDES 17.2 MG: 8.6 TABLET, FILM COATED ORAL at 09:13

## 2024-04-06 RX ADMIN — MEXILETINE HYDROCHLORIDE 300 MG: 150 CAPSULE ORAL at 05:44

## 2024-04-06 RX ADMIN — HYDROCODONE BITARTRATE AND ACETAMINOPHEN 1 TABLET: 5; 325 TABLET ORAL at 17:05

## 2024-04-06 RX ADMIN — SODIUM CHLORIDE 100 MG: 9 INJECTION, SOLUTION INTRAVENOUS at 12:15

## 2024-04-06 RX ADMIN — HYDROCODONE BITARTRATE AND ACETAMINOPHEN 1 TABLET: 5; 325 TABLET ORAL at 01:35

## 2024-04-06 RX ADMIN — Medication 100 MG: at 09:10

## 2024-04-06 RX ADMIN — FOLIC ACID 1 MG: 1 TABLET ORAL at 09:13

## 2024-04-06 RX ADMIN — PHYTONADIONE 5 MG: 5 TABLET ORAL at 09:14

## 2024-04-06 RX ADMIN — HYDROCODONE BITARTRATE AND ACETAMINOPHEN 1 TABLET: 5; 325 TABLET ORAL at 23:30

## 2024-04-06 RX ADMIN — HYDROCODONE BITARTRATE AND ACETAMINOPHEN 1 TABLET: 5; 325 TABLET ORAL at 10:55

## 2024-04-06 RX ADMIN — Medication 1 TABLET: at 09:12

## 2024-04-06 RX ADMIN — AMIODARONE HYDROCHLORIDE 200 MG: 200 TABLET ORAL at 09:12

## 2024-04-06 RX ADMIN — MEXILETINE HYDROCHLORIDE 300 MG: 150 CAPSULE ORAL at 12:51

## 2024-04-06 RX ADMIN — HYDROXYZINE HYDROCHLORIDE 25 MG: 25 TABLET, FILM COATED ORAL at 21:41

## 2024-04-06 RX ADMIN — BUMETANIDE 2 MG: 1 TABLET ORAL at 09:14

## 2024-04-06 RX ADMIN — SODIUM CHLORIDE, PRESERVATIVE FREE 10 ML: 5 INJECTION INTRAVENOUS at 21:36

## 2024-04-06 RX ADMIN — PANTOPRAZOLE SODIUM 40 MG: 40 TABLET, DELAYED RELEASE ORAL at 05:44

## 2024-04-06 RX ADMIN — ATORVASTATIN CALCIUM 40 MG: 40 TABLET, FILM COATED ORAL at 21:36

## 2024-04-06 RX ADMIN — SODIUM CHLORIDE, PRESERVATIVE FREE 10 ML: 5 INJECTION INTRAVENOUS at 12:15

## 2024-04-06 RX ADMIN — BUPROPION HYDROCHLORIDE 150 MG: 150 TABLET, EXTENDED RELEASE ORAL at 09:14

## 2024-04-06 RX ADMIN — FERROUS SULFATE TAB 325 MG (65 MG ELEMENTAL FE) 325 MG: 325 (65 FE) TAB at 09:13

## 2024-04-06 RX ADMIN — METOPROLOL SUCCINATE 100 MG: 100 TABLET, EXTENDED RELEASE ORAL at 09:11

## 2024-04-06 RX ADMIN — ONDANSETRON 4 MG: 4 TABLET, ORALLY DISINTEGRATING ORAL at 00:35

## 2024-04-06 RX ADMIN — INSULIN GLARGINE 5 UNITS: 100 INJECTION, SOLUTION SUBCUTANEOUS at 21:36

## 2024-04-06 ASSESSMENT — PAIN DESCRIPTION - LOCATION
LOCATION: ABDOMEN;LEG
LOCATION: LEG
LOCATION: ABDOMEN;LEG
LOCATION: LEG

## 2024-04-06 ASSESSMENT — PAIN DESCRIPTION - ORIENTATION
ORIENTATION: RIGHT;LEFT
ORIENTATION: LEFT;RIGHT
ORIENTATION: LEFT;RIGHT
ORIENTATION: RIGHT;LEFT
ORIENTATION: RIGHT;LEFT
ORIENTATION: LEFT;RIGHT

## 2024-04-06 ASSESSMENT — PAIN SCALES - GENERAL
PAINLEVEL_OUTOF10: 0
PAINLEVEL_OUTOF10: 8
PAINLEVEL_OUTOF10: 7
PAINLEVEL_OUTOF10: 8
PAINLEVEL_OUTOF10: 0
PAINLEVEL_OUTOF10: 8
PAINLEVEL_OUTOF10: 8
PAINLEVEL_OUTOF10: 9
PAINLEVEL_OUTOF10: 0

## 2024-04-06 ASSESSMENT — PAIN DESCRIPTION - DESCRIPTORS
DESCRIPTORS: ACHING
DESCRIPTORS: SHARP
DESCRIPTORS: ACHING;SORE
DESCRIPTORS: SHARP
DESCRIPTORS: SHARP
DESCRIPTORS: ACHING;SHOOTING;SORE

## 2024-04-06 ASSESSMENT — PAIN - FUNCTIONAL ASSESSMENT
PAIN_FUNCTIONAL_ASSESSMENT: ACTIVITIES ARE NOT PREVENTED
PAIN_FUNCTIONAL_ASSESSMENT: PREVENTS OR INTERFERES SOME ACTIVE ACTIVITIES AND ADLS
PAIN_FUNCTIONAL_ASSESSMENT: ACTIVITIES ARE NOT PREVENTED
PAIN_FUNCTIONAL_ASSESSMENT: ACTIVITIES ARE NOT PREVENTED

## 2024-04-06 ASSESSMENT — PAIN DESCRIPTION - PAIN TYPE: TYPE: ACUTE PAIN

## 2024-04-06 ASSESSMENT — PAIN DESCRIPTION - FREQUENCY: FREQUENCY: CONTINUOUS

## 2024-04-06 ASSESSMENT — PAIN DESCRIPTION - ONSET: ONSET: ON-GOING

## 2024-04-07 VITALS
HEIGHT: 74 IN | WEIGHT: 252.65 LBS | SYSTOLIC BLOOD PRESSURE: 147 MMHG | OXYGEN SATURATION: 99 % | DIASTOLIC BLOOD PRESSURE: 69 MMHG | TEMPERATURE: 97.9 F | RESPIRATION RATE: 16 BRPM | HEART RATE: 60 BPM | BODY MASS INDEX: 32.42 KG/M2

## 2024-04-07 LAB
25(OH)D3 SERPL-MCNC: 13 NG/ML (ref 30–100)
ALBUMIN SERPL BCG-MCNC: 3.5 G/DL (ref 3.5–5.1)
ALP SERPL-CCNC: 102 U/L (ref 38–126)
ALT SERPL W/O P-5'-P-CCNC: 9 U/L (ref 11–66)
ANION GAP SERPL CALC-SCNC: 15 MEQ/L (ref 8–16)
AST SERPL-CCNC: 15 U/L (ref 5–40)
BASOPHILS ABSOLUTE: 0.1 THOU/MM3 (ref 0–0.1)
BASOPHILS NFR BLD AUTO: 1 %
BILIRUB CONJ SERPL-MCNC: < 0.2 MG/DL (ref 0–0.3)
BILIRUB SERPL-MCNC: 0.4 MG/DL (ref 0.3–1.2)
BUN SERPL-MCNC: 42 MG/DL (ref 7–22)
CALCIUM SERPL-MCNC: 8.4 MG/DL (ref 8.5–10.5)
CHLORIDE SERPL-SCNC: 106 MEQ/L (ref 98–111)
CO2 SERPL-SCNC: 20 MEQ/L (ref 23–33)
CREAT SERPL-MCNC: 3.6 MG/DL (ref 0.4–1.2)
DEPRECATED RDW RBC AUTO: 58.9 FL (ref 35–45)
EOSINOPHIL NFR BLD AUTO: 5.3 %
EOSINOPHILS ABSOLUTE: 0.4 THOU/MM3 (ref 0–0.4)
ERYTHROCYTE [DISTWIDTH] IN BLOOD BY AUTOMATED COUNT: 16.5 % (ref 11.5–14.5)
GFR SERPL CREATININE-BSD FRML MDRD: 19 ML/MIN/1.73M2
GLUCOSE BLD STRIP.AUTO-MCNC: 193 MG/DL (ref 70–108)
GLUCOSE BLD STRIP.AUTO-MCNC: 305 MG/DL (ref 70–108)
GLUCOSE BLD STRIP.AUTO-MCNC: 84 MG/DL (ref 70–108)
GLUCOSE SERPL-MCNC: 77 MG/DL (ref 70–108)
HCT VFR BLD AUTO: 27.9 % (ref 42–52)
HGB BLD-MCNC: 8.2 GM/DL (ref 14–18)
IMM GRANULOCYTES # BLD AUTO: 0.05 THOU/MM3 (ref 0–0.07)
IMM GRANULOCYTES NFR BLD AUTO: 0.7 %
INR PPP: 1.52 (ref 0.85–1.13)
LYMPHOCYTES ABSOLUTE: 0.9 THOU/MM3 (ref 1–4.8)
LYMPHOCYTES NFR BLD AUTO: 12.2 %
MCH RBC QN AUTO: 28.6 PG (ref 26–33)
MCHC RBC AUTO-ENTMCNC: 29.4 GM/DL (ref 32.2–35.5)
MCV RBC AUTO: 97.2 FL (ref 80–94)
MONOCYTES ABSOLUTE: 0.6 THOU/MM3 (ref 0.4–1.3)
MONOCYTES NFR BLD AUTO: 8.7 %
NEUTROPHILS NFR BLD AUTO: 72.1 %
NRBC BLD AUTO-RTO: 0 /100 WBC
PHOSPHATE SERPL-MCNC: 4.5 MG/DL (ref 2.4–4.7)
PLATELET # BLD AUTO: 185 THOU/MM3 (ref 130–400)
PMV BLD AUTO: 10.4 FL (ref 9.4–12.4)
POTASSIUM SERPL-SCNC: 4.3 MEQ/L (ref 3.5–5.2)
PROT SERPL-MCNC: 6.5 G/DL (ref 6.1–8)
PTH-INTACT SERPL-MCNC: 161.4 PG/ML (ref 15–65)
RBC # BLD AUTO: 2.87 MILL/MM3 (ref 4.7–6.1)
SEGMENTED NEUTROPHILS ABSOLUTE COUNT: 5.2 THOU/MM3 (ref 1.8–7.7)
SODIUM SERPL-SCNC: 141 MEQ/L (ref 135–145)
WBC # BLD AUTO: 7.2 THOU/MM3 (ref 4.8–10.8)

## 2024-04-07 PROCEDURE — 85025 COMPLETE CBC W/AUTO DIFF WBC: CPT

## 2024-04-07 PROCEDURE — 6370000000 HC RX 637 (ALT 250 FOR IP)

## 2024-04-07 PROCEDURE — 84100 ASSAY OF PHOSPHORUS: CPT

## 2024-04-07 PROCEDURE — 80048 BASIC METABOLIC PNL TOTAL CA: CPT

## 2024-04-07 PROCEDURE — 36415 COLL VENOUS BLD VENIPUNCTURE: CPT

## 2024-04-07 PROCEDURE — 99238 HOSP IP/OBS DSCHRG MGMT 30/<: CPT | Performed by: STUDENT IN AN ORGANIZED HEALTH CARE EDUCATION/TRAINING PROGRAM

## 2024-04-07 PROCEDURE — 6370000000 HC RX 637 (ALT 250 FOR IP): Performed by: STUDENT IN AN ORGANIZED HEALTH CARE EDUCATION/TRAINING PROGRAM

## 2024-04-07 PROCEDURE — 83970 ASSAY OF PARATHORMONE: CPT

## 2024-04-07 PROCEDURE — 82948 REAGENT STRIP/BLOOD GLUCOSE: CPT

## 2024-04-07 PROCEDURE — 2580000003 HC RX 258

## 2024-04-07 PROCEDURE — 82306 VITAMIN D 25 HYDROXY: CPT

## 2024-04-07 PROCEDURE — 80076 HEPATIC FUNCTION PANEL: CPT

## 2024-04-07 PROCEDURE — 85610 PROTHROMBIN TIME: CPT

## 2024-04-07 RX ORDER — ISOSORBIDE MONONITRATE 60 MG/1
120 TABLET, EXTENDED RELEASE ORAL DAILY
Status: DISCONTINUED | OUTPATIENT
Start: 2024-04-07 | End: 2024-04-07 | Stop reason: HOSPADM

## 2024-04-07 RX ORDER — INSULIN LISPRO 100 [IU]/ML
4 INJECTION, SOLUTION INTRAVENOUS; SUBCUTANEOUS ONCE
Status: DISCONTINUED | OUTPATIENT
Start: 2024-04-07 | End: 2024-04-07 | Stop reason: HOSPADM

## 2024-04-07 RX ORDER — FERROUS SULFATE 325(65) MG
325 TABLET ORAL EVERY OTHER DAY
Qty: 30 TABLET | Refills: 3 | Status: SHIPPED | OUTPATIENT
Start: 2024-04-08

## 2024-04-07 RX ORDER — WARFARIN SODIUM 5 MG/1
5 TABLET ORAL ONCE
Status: DISCONTINUED | OUTPATIENT
Start: 2024-04-07 | End: 2024-04-07 | Stop reason: HOSPADM

## 2024-04-07 RX ORDER — ERGOCALCIFEROL 1.25 MG/1
50000 CAPSULE ORAL WEEKLY
Status: DISCONTINUED | OUTPATIENT
Start: 2024-04-07 | End: 2024-04-07

## 2024-04-07 RX ADMIN — HYDROCODONE BITARTRATE AND ACETAMINOPHEN 1 TABLET: 5; 325 TABLET ORAL at 12:04

## 2024-04-07 RX ADMIN — HYDROCODONE BITARTRATE AND ACETAMINOPHEN 1 TABLET: 5; 325 TABLET ORAL at 05:54

## 2024-04-07 RX ADMIN — MEXILETINE HYDROCHLORIDE 300 MG: 150 CAPSULE ORAL at 04:56

## 2024-04-07 RX ADMIN — AMIODARONE HYDROCHLORIDE 200 MG: 200 TABLET ORAL at 09:43

## 2024-04-07 RX ADMIN — PANTOPRAZOLE SODIUM 40 MG: 40 TABLET, DELAYED RELEASE ORAL at 04:56

## 2024-04-07 RX ADMIN — HYDRALAZINE HYDROCHLORIDE 100 MG: 50 TABLET ORAL at 09:43

## 2024-04-07 RX ADMIN — FOLIC ACID 1 MG: 1 TABLET ORAL at 09:43

## 2024-04-07 RX ADMIN — SODIUM CHLORIDE, PRESERVATIVE FREE 10 ML: 5 INJECTION INTRAVENOUS at 09:44

## 2024-04-07 RX ADMIN — Medication 1 TABLET: at 09:43

## 2024-04-07 RX ADMIN — ERGOCALCIFEROL 50000 UNITS: 1.25 CAPSULE ORAL at 09:44

## 2024-04-07 RX ADMIN — BUMETANIDE 2 MG: 1 TABLET ORAL at 09:43

## 2024-04-07 RX ADMIN — SENNOSIDES 17.2 MG: 8.6 TABLET, FILM COATED ORAL at 09:40

## 2024-04-07 RX ADMIN — Medication 100 MG: at 09:43

## 2024-04-07 RX ADMIN — BUPROPION HYDROCHLORIDE 150 MG: 150 TABLET, EXTENDED RELEASE ORAL at 09:43

## 2024-04-07 RX ADMIN — ISOSORBIDE MONONITRATE 120 MG: 60 TABLET, EXTENDED RELEASE ORAL at 13:01

## 2024-04-07 ASSESSMENT — PAIN DESCRIPTION - PAIN TYPE
TYPE: CHRONIC PAIN
TYPE: ACUTE PAIN

## 2024-04-07 ASSESSMENT — PAIN SCALES - GENERAL
PAINLEVEL_OUTOF10: 7
PAINLEVEL_OUTOF10: 4
PAINLEVEL_OUTOF10: 7
PAINLEVEL_OUTOF10: 0
PAINLEVEL_OUTOF10: 7
PAINLEVEL_OUTOF10: 8

## 2024-04-07 ASSESSMENT — PAIN DESCRIPTION - FREQUENCY
FREQUENCY: CONTINUOUS
FREQUENCY: CONTINUOUS

## 2024-04-07 ASSESSMENT — PAIN DESCRIPTION - LOCATION
LOCATION: GENERALIZED;LEG
LOCATION: LEG

## 2024-04-07 ASSESSMENT — PAIN DESCRIPTION - ORIENTATION
ORIENTATION: LEFT;RIGHT
ORIENTATION: RIGHT;LEFT

## 2024-04-07 ASSESSMENT — PAIN DESCRIPTION - DESCRIPTORS
DESCRIPTORS: ACHING
DESCRIPTORS: ACHING

## 2024-04-07 ASSESSMENT — PAIN DESCRIPTION - ONSET
ONSET: ON-GOING
ONSET: ON-GOING

## 2024-04-07 NOTE — CARE COORDINATION
4/7/24, 12:25 PM EDT    Patient goals/plan/ treatment preferences discussed by  and .  Patient goals/plan/ treatment preferences reviewed with patient/ family.  Patient/ family verbalize understanding of discharge plan and are in agreement with goal/plan/treatment preferences.  Understanding was demonstrated using the teach back method.  AVS provided by RN at time of discharge, which includes all necessary medical information pertaining to the patients current course of illness, treatment, post-discharge goals of care, and treatment preferences.     Services At/After Discharge: None  Met with Garrett. He is agreeable with discharge today. Plans to return home alone. Declines HH/OP therapies. He will have transportation home.

## 2024-04-07 NOTE — DISCHARGE SUMMARY
DISCHARGE SUMMARY      Patient Identification:   Garrett Duncan   : 1965  MRN: [id number]   Account: [id number]      Patient's PCP: Leonel Adorno, APRN - CNP    Admit Date: 2009     Discharge Date: 24     Admitting Physician: Romeo Interiano MD     Discharge Physician: Valerie No MD     Discharge Diagnoses:    SOB with possible hemoptysis, resolved  Alcohol Abuse Disorder  Supra-therapeutic INR  Acute on Chronic Macrocytic Anemia  Acute on Chronic Systolic HFrEF POA, improved  Chronic Pancreatitis, imrpoved  Mild BABAK on CKD 4 POA  Secondary hyperparathyroidism 2/2 CKD  CAD s/p CABG and ICD  Paxoxysmal A. Fib s/p ablation  HLD  DM-2  GERD  MDD/Anxiety  Tobacco Abuse  Malnutrition  Medical Non-compliance  Obesity    The patient was seen and examined on day of discharge and this discharge summary is in conjunction with any daily progress note from day of discharge.    Hospital Course:   Garrett Duncan is a 58 y.o. male with  has a past medical history of Acute systolic CHF (congestive heart failure) (HCC), Alcohol abuse, Anomalous origin of right coronary artery, CAD (coronary artery disease), Chronic kidney disease, Diabetes mellitus (HCC), Drug abuse (HCC), GERD (gastroesophageal reflux disease), History of implantable cardiac defibrillator (ICD), Hypertension, Intradural extramedullary spinal tumor, Long term (current) use of anticoagulants, Medtronic dual icd , Neuromuscular disorder (HCC), Paroxysmal atrial fibrillation (HCC), Severe obesity (BMI 35.0-39.9) with comorbidity (HCC), and V-tach (HCC) who was admitted to [facility] on 2024 for SOB.      Hospital Course By Problem:       SOB with possible hemoptysis, resolved: PTA pt reports a 4-5-day history of intermittent mild non-life-threatening hemoptysis with blood-tinged sputum. Known hx of tobacco and alcohol abuse disorder, and long term use of warfarin. CT Chest  negative for nodule, granuloma, or obvious

## 2024-04-07 NOTE — DISCHARGE INSTR - MEDS
Warfarin Discharge Instructions:   Date Warfarin Dose   4/8/24 (tomorrow) 5 mg (1 tablet)   4/9/24 5 mg (1 tablet)   4/10/24 5 mg (1 tablet)   4/11/24 INR check     Provider dosing warfarin: [facility]  Next INR date: 4/11/24 - please call clinic for appointment

## 2024-04-07 NOTE — PLAN OF CARE
Problem: Discharge Planning  Goal: Discharge to home or other facility with appropriate resources  Outcome: Completed     Problem: Pain  Goal: Verbalizes/displays adequate comfort level or baseline comfort level  Outcome: Completed     Problem: Safety - Adult  Goal: Free from fall injury  Outcome: Completed     Problem: Chronic Conditions and Co-morbidities  Goal: Patient's chronic conditions and co-morbidity symptoms are monitored and maintained or improved  Outcome: Completed     Problem: Hematologic - Adult  Goal: Maintains hematologic stability  Outcome: Completed

## 2024-04-08 ENCOUNTER — CARE COORDINATION (OUTPATIENT)
Dept: CASE MANAGEMENT | Age: 59
End: 2024-04-08

## 2024-04-08 ENCOUNTER — TELEPHONE (OUTPATIENT)
Dept: FAMILY MEDICINE CLINIC | Age: 59
End: 2024-04-08

## 2024-04-08 LAB
EKG ATRIAL RATE: 56 BPM
EKG ATRIAL RATE: 63 BPM
EKG P AXIS: 71 DEGREES
EKG P-R INTERVAL: 216 MS
EKG P-R INTERVAL: 232 MS
EKG Q-T INTERVAL: 472 MS
EKG Q-T INTERVAL: 486 MS
EKG QRS DURATION: 100 MS
EKG QRS DURATION: 98 MS
EKG QTC CALCULATION (BAZETT): 455 MS
EKG QTC CALCULATION (BAZETT): 497 MS
EKG R AXIS: 5 DEGREES
EKG R AXIS: 7 DEGREES
EKG T AXIS: 108 DEGREES
EKG T AXIS: 129 DEGREES
EKG VENTRICULAR RATE: 56 BPM
EKG VENTRICULAR RATE: 63 BPM

## 2024-04-08 NOTE — TELEPHONE ENCOUNTER
Care Transitions Initial Follow Up Call    Outreach made within 2 business days of discharge: Yes    Patient: Garrett Duncan Patient : 1965   MRN: [id number]  Reason for Admission: There are no discharge diagnoses documented for the most recent discharge.  Discharge Date: 24       Spoke with: LVM    Discharge department/facility: [facility]      Schedule appointment with PCP within 7-14 days    Follow Up  Future Appointments   Date Time Provider Department Center   4/10/2024  1:30 PM Radha Miranda APRN - CNP [facility] [place]   2024  3:15 PM Mortimer, Connor, PA-C [facility] [place]   2024  3:00 PM Samuel Kathleen MD [facility] [place]   2024  3:30 PM [facility] PULMONARY FUNCTION ROOM 1 [facility]   2024  3:30 PM Leonel Adorno APRN - CNP [facility] [place]   2024  3:30 PM Shelly Johnson MD [facility] [place]   2025  1:45 PM Fany Guzmán MD [facility]       Queta Steven CMA (SORINMA)

## 2024-04-08 NOTE — ADT AUTH CERT
well-controlled with compression stockings.   Skin:     General: Skin is warm and dry.      Capillary Refill: Capillary refill takes less than 2 seconds.   Neurological:      General: No focal deficit present.      Mental Status: He is alert and oriented to person, place, and time.         MD CONSULTS/ASSESSMENTS & PLANS:  H&P  Assessment / Plan   Briefly, pt Garrett Duncan is a 58 y.o. male with a PMH of CHF, CKD, anemia, chronic pancreatitis, PAF, NSVT, HTN, DM who presented with fatigue, shortness of breath, abdominal pain.     #SOB, fatigue, ?hemoptysis: Patient presents with significant WEATHERS, fatigue at home.  He reports a 4-5-day history of intermittent mild non-life-threatening hemoptysis with blood-tinged sputum.  He does have significant smoking history.  Denies COPD history, no wheezing noted on exam.  Afebrile, no leukocytosis.  CHF management per below.  No O2 requirement noted. No recent CT chest available.  Does have significant malignancy risk factors.  Will monitor for hemoptysis overnight. ?anemia contribution given acute worsening per below. Likely significant contribution from chronic conditions. Would likely benefit from CT chest with contrast given ?hemoptysis. Given renal function, will order CT chest without contrast to evaluate for intrathoracic pathology  #?BABAK on CKDIV: Likely 2/2 diabetic nephropathy and HTN. Cr 3.7 on admission, mildly increased from levels at previous discharge.  Creatinine appears labile over the past few months, unclear if new baseline.  Will continue with diuresis in setting of CHF history, repeat metabolic panel in AM.  Urine studies pending  #Chronic systolic heart failure, ICM: Echo 3/7/2024: EF 30-35%, LV mildly dilated, severe global hypokinesis, indeterminate diastolic function.  Patient mildly hypervolemic on exam, suspect at his baseline with 1+ pitting edema and mild bibasilar pulmonary edema with crackles. BNP 16,239 on admission. Will continue with Metoprolol

## 2024-04-08 NOTE — CARE COORDINATION
with PCP within 7 days of discharge.  He will contact the office who called him this morning.  He does not have any questions, concerns or needs at this time.  He is agreeable to follow up calls.  States may not always be able to talk d/t being at work.      Care Transition Nurse reviewed discharge instructions, medical action plan, and red flags with patient who verbalized understanding. The patient was given an opportunity to ask questions and does not have any further questions or concerns at this time. Were discharge instructions available to patient? Yes. Reviewed appropriate site of care based on symptoms and resources available to patient including: PCP  Specialist  Urgent care clinics  When to call 911. The patient agrees to contact the PCP office for questions related to their healthcare.     Advance Care Planning:   Does patient have an Advance Directive:  on file .    Was patient discharged with a pulse oximeter? no    Scheduled appointment with Specialist-CHF clinic on 4/10/24, cardiology on 4/17/24  Obtained and reviewed discharge summary and/or continuity of care documents    Offered patient enrollment in the Remote Patient Monitoring (RPM) program for in-home monitoring: Patient is not eligible for RPM program.    Care Transitions 24 Hour Call    Do you have a copy of your discharge instructions?: Yes  Do you have all of your prescriptions and are they filled?: Yes  Have you been contacted by a [facility] Pharmacist?: No  Have you scheduled your follow up appointment?: Yes  How are you going to get to your appointment?: Car - drive self  Do you feel like you have everything you need to keep you well at home?: Yes  Care Transitions Interventions    Pharmacist: Declined       Transportation Support: Declined     Registered Dietician: Declined    Diabetes Education: Declined      Disease Specific Clinic: Declined      Disease Association: Completed               Discussed follow-up appointments. If no

## 2024-04-08 NOTE — PROGRESS NOTES
Clinical Pharmacy Note                                               Warfarin Discharge Recommendations    Patient discharged from [facility] today on warfarin.    Warfarin indication: Atrial fibrillation or Atrial flutter  INR goal during admission: 2.0-3.0  Previous home warfarin regimen: 2.5mg F, 5mg all other days  Interacting medications at discharge: amiodarone  Coumadin 5 mg tabs    Hospital Warfarin Doses & INR Results  Concurrent anticoagulants/antiplatelets: none  Significant warfarin drug-drug interactions: amiodarone (HM)     Date INR Warfarin Dose   4/6/24 4.21 No warfarin  Vit K 5mg PO    4/7/24 1.52  5 mg                                                Recent INRs:  Recent Labs     04/07/24  0535   INR 1.52*     Warfarin Discharge Instructions:   Date Warfarin Dose   4/8/24 (tomorrow) 5 mg (1 tablet)   4/9/24 5 mg (1 tablet)   4/10/24 5 mg (1 tablet)   4/11/24 INR check     Provider dosing warfarin: [facility]  Next INR date: 4/11/24 - please call clinic for appointment      
  PROGRESS NOTE      Patient:  Garrett Duncan  Unit/Bed:6K-19/019-A  YOB: 1965  MRN: [id number]   Acct: [id number]    PCP: Leonel Adorno APRN - CNP    Date of Admission: 4/4/2024 LOS: 1    Date of Evaluation:  4/6/2024    Anticipated Discharge: Tomorrow     Assessment/Plan:    SOB with possible hemoptysis, resolved: PTA pt reports a 4-5-day history of intermittent mild non-life-threatening hemoptysis with blood-tinged sputum. Known hx of tobacco and alcohol abuse disorder, and long term use of warfarin. CT Chest 4/5 negative for nodule, granuloma, or obvious Tena-Wies tear. Patient cannot determine if GI or Pulm source of bleeding. Pulm source unlikely given negative CT, GI source suspected. S/p 5 mg Vitamin K given supra-therapeutic INR. Pharmacy consult for warfarin dosing in anticipation for DC. Consider outpatient EGD given negative workup.  Alcohol Abuse Disorder: Last drink per patient several (2-3) weeks ago. Likely underlying etiology of presentation given malnutrition and anemia.  Supra-therapeutic INR: INR 4/4 at admission elevated to 3.37 -> 4.21 on 4/6. Given GI bleed concern at admission and rising INR despite hold at admission, s/p 5mg PO vitamin K on 4/6. Pharmacy consult for warfarin, holding as of 4/6/2024. Monitor INR with goal 2-3 and after DC follow up with [facility] coumadin clinic.  Acute on Chronic Macrocytic Anemia: Baseline Hgb ~9 prior to admission, however etiology likely multifactorial with CKD and alcohol abuse disorder. Iron studies demonstrate deficiency. Will order IV venofer x 2 dose during admission and start EOD iron PO. B9/12 were WNL. Ordered vitamin D.  Acute on Chronic Systolic HFrEF POA, improved:  Echo 3/7/2024: EF 30-35%, LV mildly dilated, severe global hypokinesis, indeterminate diastolic function.  Patient mildly hypervolemic on exam, suspect at his baseline with 1+ pitting edema and mild bibasilar pulmonary edema with crackles. BNP 16,239 on admission. CT 
  Physician Progress Note      PATIENT:               ALESSIO SARABIA  CSN #:                  [id number]  :                       1965  ADMIT DATE:       2024 8:09 PM  DISCH DATE:        2024 1:27 PM  RESPONDING  PROVIDER #:        Laisha Walters MD          QUERY TEXT:    Patient admitted with Hemoptysis and is on chronic anticoagulation.   If   possible, please document in the progress notes and discharge summary if you   are evaluating and/or treating any of the following:    The medical record reflects the following:  Risk Factors: DM, CAD, PAF,  Clinical Indicators: supra-therapeutic INR, hemoptysis  Treatment: Vitamin K, lab monitoring, Coumadin on Hold    Thank You!  Anika Lopez RN, CRCR  RN Clinical Documentation Integrity  Options provided:  -- Hemoptysis associated with Coumadin  -- Bleeding unrelated to anticoagulation  -- Other - I will add my own diagnosis  -- Disagree - Not applicable / Not valid  -- Disagree - Clinically unable to determine / Unknown  -- Refer to Clinical Documentation Reviewer    PROVIDER RESPONSE TEXT:    Patient bleeding is unrelated to anticoagulation.    Query created by: Anika Lopez on 2024 8:49 AM      Electronically signed by:  Laisha Walters MD 2024 5:27 PM          
Discussed discharge instructions with patient. Patient voiced understanding. Patient encouraged to call [facility] coumadin clinical and have INR checked Thursday 4/11. Encouraged patient to call PCP Tuesday 4/9 and schedule a follow up within 1 week. Encouraged patient to take evening medications, Hydralazine, Coumadin and mexitil. Patient states he is okay to drive himself although he has received oral pain medications.   Kelsea Lopez RSSTELLA/SN  
Patient educated on how to use incentive spirometer. Patient verbalized understanding and demonstrated proper use. Emphasized importance and usage of device, with coughing and deep breathing every 2 hours while awake.         
Patient refusing to lay in bed with bed alarm on. Patient educated by this RN about why we use bed alarms and safety education. Patient politely declined and does not want the bed alarm on.   
Warfarin Pharmacy Consult Note    Garrett Duncan is a 58 y.o. male for whom pharmacy has been asked to manage warfarin therapy.     Consulting Provider: Dr. Noble  Warfarin Indication: Atrial fibrillation or Atrial flutter  Target INR range: 2.0-3.0   Warfarin dose prior to admission: 2.5mg F, 5mg all other days  Outpatient warfarin provider: [facility]    Recent Labs     04/04/24 2020 04/06/24 0524   INR 3.37* 4.21*     Recent Labs     04/04/24 2020 04/05/24  0545 04/06/24 0524   HGB 7.9* 7.9* 8.3*    192 184     Concurrent anticoagulants/antiplatelets: none  Significant warfarin drug-drug interactions: amiodarone (HM)    Date INR Warfarin Dose   4/6/24 4.21 No warfarin  Vit K 5mg PO                                   INR will be monitored routinely until therapeutic INR is achieved.    Pharmacy will continue to follow. Thank you for the consult,   Stephenie ROSAS.Ph., BCPS., 4/6/2024,10:47 AM      
Warfarin Pharmacy Consult Note    Warfarin Indication: Atrial fibrillation or Atrial flutter  Target INR: 2.0-3.0  Dose prior to admission: 2.5mg F, 5mg all other days    Recent Labs     04/04/24 2020 04/06/24 0524 04/07/24  0535   INR 3.37* 4.21* 1.52*     Recent Labs     04/05/24  0545 04/06/24 0524 04/07/24  0535   HGB 7.9* 8.3* 8.2*    184 185     Concurrent anticoagulants/antiplatelets: none  Significant warfarin drug-drug interactions: amiodarone (HM)     Date INR Warfarin Dose   4/6/24 4.21 No warfarin  Vit K 5mg PO    4/7/24 1.52  5 mg                                                Monitoring:                   INR will be monitored daily until therapeutic INR is achieved.    **Please contact pharmacy for discharge instructions when indicated**    Stephenie ROSAS.Ph., BCPS., 4/7/2024,7:28 AM      
Baseline Cr ~3.4 with Max Cr at 3.7. Cr as of 4/5/2024 at 3.6. Monitor. Avoid aggressive IVF given HFrEF.  CAD s/p CABG and ICD: 4/5 EKG: sinus bradycardia with 1st degree A-V block with occasional Premature ventricular complexes and Premature atrial complexes consistent with prior EKGs from this year. Monitor.  Paxoxysmal A. Fib s/p ablation: C/w Toprol 100 mg/day. Holding warfarin as of 4/5/2024 due to the above.  HLD: C/w lipitor 40 mg/nightly.  DM-2: C/w Lantus 5 units nightly and low dose sliding scale insulin with hypoglycemia protocols. Last A1c in NOV 2023 was 6.9. Repeat ordered for 4/6. Holding home farxiga.   GERD: C/w Protonix 40 mg/day.  MDD: C/w Wellbutrin.   Tobacco Abuse: CT chest 4/5 negative for nodule and granuloma. Cessation encouraged. C/w Wellbutrin.  Malnutrition: Alcohol use disorder and admitted variably poor diet. Will complete vitamin deficiency workup. C/w MVI, folate, and Thiamine.   Medical Non-compliance: Noted per EMR. Reinforce with patient education.  Obesity: Body mass index is 32.49 kg/m². Noted; Safety lifting.      Chief Complaint: SOB    Hospital Course: Per H&P  'Garrett Duncan is a 58 y.o. male with a PMH of CHF, CKD, anemia, chronic pancreatitis, PAF, NSVT, HTN, DM who presented with fatigue, shortness of breath, abdominal pain.     Patient presents to [facility] due to abdominal pain, fatigue, shortness of breath.  He reports that over the last 5 days he has had significant shortness of breath with exertion, having to rest after short distance ambulating.  He states this is significantly different from his baseline.  He denies any significant chest pain/pressure, palpitations, arm/jaw pain.  He does report that he has had some blood-tinged sputum at home for the last several days, and this has not happened before.  Patient also endorsing significant abdominal pain in the setting of chronic pancreatitis, largely described as epigastric/left upper quadrant.  He denies any other

## 2024-04-09 NOTE — TELEPHONE ENCOUNTER
Care Transitions Initial Follow Up Call    Outreach made within 2 business days of discharge: Yes    Patient: Garrett Duncan Patient : 1965   MRN: [id number]  Reason for Admission: There are no discharge diagnoses documented for the most recent discharge.  Discharge Date: 24       Spoke with: Patient    Discharge department/facility: [facility]    Patient asked for me to hold and then hung up.     Scheduled appointment with PCP within 7-14 days    Follow Up  Future Appointments   Date Time Provider Department Center   4/10/2024  1:30 PM Radha Miranda, ISABELLA - CNP [facility] [place]   2024  3:15 PM Mortimer, Connor, PA-C [facility] Heart [facility] [place]   2024  3:00 PM Samuel Kathleen MD [facility] - [place]   2024  3:30 PM [facility] PULMONARY FUNCTION ROOM 1 [facility] [place] [facility]   2024  3:30 PM Leonel Adorno APRN - CNP [facility] [place] [facility] - [place]   2024  3:30 PM Shelly Johnson MD N [facility] - [place]   2025  1:45 PM Fany Guzmán MD N [facility] [place]       Queta Steven CMA ([facility])

## 2024-04-09 NOTE — TELEPHONE ENCOUNTER
Care Transitions Initial Follow Up Call    Outreach made within 2 business days of discharge: Yes    Patient: Garrett Duncan Patient : 1965   MRN: [id number]  Reason for Admission: There are no discharge diagnoses documented for the most recent discharge.  Discharge Date: 24       Spoke with: LVM    Discharge department/facility: [facility]      Scheduled appointment with PCP within 7-14 days    Follow Up  Future Appointments   Date Time Provider Department Center   4/10/2024  1:30 PM aRdha Miranda APRN - CNP [facility]F [facility] [place]   2024  3:15 PM Mortimer, Connor, PA-C [facility] Heart [facility] [place]   2024  3:00 PM Samuel Kathleen MD [facility] - [place]   2024  3:30 PM [facility] PULMONARY FUNCTION ROOM 1 [facility] [place] [facility]   2024  3:30 PM Leonel Adorno APRN - CNP [facility] [place] [facility] - [place]   2024  3:30 PM Shelly Johnson MD [facility] Heart [facility] [place]   2025  1:45 PM Fany Guzmán MD N [facility] Heart P [facility] [place]       Queta Steven CMA ([facility])

## 2024-04-10 ENCOUNTER — TELEPHONE (OUTPATIENT)
Dept: CARDIOLOGY CLINIC | Age: 59
End: 2024-04-10

## 2024-04-10 ENCOUNTER — CARE COORDINATION (OUTPATIENT)
Dept: CASE MANAGEMENT | Age: 59
End: 2024-04-10

## 2024-04-10 NOTE — CARE COORDINATION
Care Transitions Note    Follow Up Call      Patient Current Location:  [place]    Care Transition Nurse contacted the patient by telephone. Verified name and  as identifiers.    Additional needs identified to be addressed with provider   No needs identified                 Method of communication with provider: none.    Care Summary Note: Contacted pt for care transition follow up.  Spoke briefly with pt.  Garrett states he is okay.  No longer having hemoptysis.  Minimal cough.  No c/o chest pain/discomfort, pressure, tightness.  Per pt, shortness of breath has gotten a lot better.  Reports he may get short of breath when lying down.  He typically sleeps on his side.  No distress.  Pt has returned to work.  States he has been feeling okay throughout the day while at work.  Pt canceled his appt with the [facility] Clinic today.  Has not followed up with PCP either.  States it is very hard to take off with his job.  Confirmed he will go to his appt with cardiology on 24.  No questions or needs at this time.  He is agreeable to follow up calls.  He will let CTN know if he doesn't want further calls.      Advance Care Planning:   Does patient have an Advance Directive:  on file .    Medication Review:  No changes since last call.     Remote Patient Monitoring:  Offered patient enrollment in the Remote Patient Monitoring (RPM) program for in-home monitoring: Patient is not eligible for RPM program.    Follow Up Appointment:   Reviewed upcoming appointment(s).  Future Appointments         Provider Specialty Dept Phone    2024 3:15 PM Mortimer, Connor, PA-C Cardiology [phone]    2024 3:00 PM Samuel Kathleen MD Nephrology [phone]    2024 3:30 PM [facility] PULMONARY FUNCTION ROOM 1 Pulmonary Function Testing [phone]    2024 3:30 PM Leonel Adorno, APRN - CNP Family Medicine [phone]    2024 3:30 PM Shelly Johnson MD Cardiology [phone]    2025 1:45 PM Fany Guzmán MD

## 2024-04-10 NOTE — TELEPHONE ENCOUNTER
Patient wanted to cancel appointment per silvio  Called patient   He does not have vacation time and does not get off work until 3  He has appointment next week in cardiology and will discuss with them

## 2024-04-15 ENCOUNTER — TELEPHONE (OUTPATIENT)
Dept: FAMILY MEDICINE CLINIC | Age: 59
End: 2024-04-15

## 2024-04-15 ENCOUNTER — CARE COORDINATION (OUTPATIENT)
Dept: CASE MANAGEMENT | Age: 59
End: 2024-04-15

## 2024-04-15 RX ORDER — BUMETANIDE 2 MG/1
2 TABLET ORAL DAILY
Qty: 90 TABLET | Refills: 3 | Status: SHIPPED | OUTPATIENT
Start: 2024-04-15

## 2024-04-15 RX ORDER — FOLIC ACID 1 MG/1
1 TABLET ORAL DAILY
Qty: 90 TABLET | Refills: 3 | Status: SHIPPED | OUTPATIENT
Start: 2024-04-15 | End: 2025-04-10

## 2024-04-15 RX ORDER — LANOLIN ALCOHOL/MO/W.PET/CERES
100 CREAM (GRAM) TOPICAL DAILY
Qty: 90 TABLET | Refills: 3 | Status: SHIPPED | OUTPATIENT
Start: 2024-04-15

## 2024-04-15 NOTE — TELEPHONE ENCOUNTER
Patient called and stated that he was given medication when he was in the hospital and he needs refills.     He will need folic acid 1 mg, bumetanide 2 mg and thiamine 100 mg.   He would like the refills for 90 day supply and sent to [facility]. He will check the pharmacy around 1:00 pm today.

## 2024-04-15 NOTE — TELEPHONE ENCOUNTER
Pt called office stating he was admitted to [facility] for 3days and discharged on Sunday 4/7/2024. Pt went to work on Monday 4/8/2024 and after working 6hrs \"I left at noon\", pt was scheduled until 7pm that day. Pt says he was tired and fatigued, so he left work. Pt has worked since that day. Work is now saying he needs FMLA paperwork completed for the 7hrs he missed on Monday 4/7/24. Pt is asking if you will complete FMLA. Please advise.

## 2024-04-15 NOTE — CARE COORDINATION
Care Transitions Follow Up Call    Patient Current Location:  Home: [address] [place] [id number]    Care Transition Nurse contacted the patient by telephone to follow up after admission on 24.  Verified name and  with patient as identifiers.    Patient: Garrett Duncan  Patient : 1965   MRN: [id number]  Reason for Admission: anemia, fatigue   Discharge Date: 24 RARS: Readmission Risk Score: 37      Needs to be reviewed by the provider   Additional needs identified to be addressed with provider: No  none             Method of communication with provider: none.    CTN call to Garrett today and he says he is feeling fine, is at work and cannot talk.  Denies hemoptysis. Doesn't know when his next INR is scheduled at [facility].  Agrees to future calls but not until after 3:30pm.    Addressed changes since last contact:  none  Discussed follow-up appointments. If no appointment was previously scheduled, appointment scheduling offered: Yes.   Is follow up appointment scheduled within 7 days of discharge? No.    Follow Up  Future Appointments   Date Time Provider Department Center   2024  3:15 PM Mortimer, Connor, PA-C N [facility] Heart P - [place]   2024  3:00 PM Samuel Kathleen MD [facility] [place] [facility] - [place]   2024  3:30 PM [facility]   2024  3:30 PM Leonel Adorno, APRN - CNP [facility] [place] [facility] - [place]   2024  3:30 PM Shelly Johnson MD N [facility] Heart P - [place]   2025  1:45 PM Fany Guzmán MD N [facility] Heart P - [place]     External follow up appointment(s):     Care Transition Nurse reviewed discharge instructions, medical action plan, and red flags with patient and discussed any barriers to care and/or understanding of plan of care after discharge. Discussed appropriate site of care based on symptoms and resources available to patient including: PCP  Specialist  Urgent care clinics  When to call 911  [employer] Messaging. The patient agrees to

## 2024-04-16 NOTE — TELEPHONE ENCOUNTER
Pt was notified and voiced understanding regarding FMLA.     Pt is asking if you received the [employer] paper he dropped off \"for Medical\". Are you able to fill that out for Monday 4/8/24?

## 2024-04-16 NOTE — TELEPHONE ENCOUNTER
Spoke with pt who states the [facility] paperwork you received is just for 24 from 12pm-7pm. Pt says he also dropped off a Medical Form from [employer] that needs to be completed for 2024 as well. Please advise.    Pt is asking if he gets another set of [facility] paperwork to the office if you would fill it out for intermittent leave for his CHF. Pt says you have filled this out in the past \"but I let it \". Please advise.

## 2024-04-16 NOTE — TELEPHONE ENCOUNTER
I should be able to put the intermittent on current [facility] paperwork along with the missed monday

## 2024-04-19 RX ORDER — MEXILETINE HYDROCHLORIDE 150 MG/1
300 CAPSULE ORAL EVERY 8 HOURS SCHEDULED
Qty: 540 CAPSULE | Refills: 0 | Status: SHIPPED | OUTPATIENT
Start: 2024-04-19 | End: 2024-07-18

## 2024-04-19 NOTE — TELEPHONE ENCOUNTER
Garrett Duncan called requesting a refill on the following medications:  Requested Prescriptions     Pending Prescriptions Disp Refills    mexiletine (MEXITIL) 150 MG capsule 120 capsule 1     Sig: Take 2 capsules by mouth every 8 hours     Pharmacy verified: [facility] [address]  .pv    PATIENT SAID THIS WAS RESTARTED WHEN INPATIENT AT [facility] FOR HIS AFIB  HE NEEDS 90 DAY SUPPLIES    Date of last visit: 2/26/2024  Date of next visit (if applicable): 5/16/2024

## 2024-04-22 ENCOUNTER — CARE COORDINATION (OUTPATIENT)
Dept: CASE MANAGEMENT | Age: 59
End: 2024-04-22

## 2024-04-22 RX ORDER — TICAGRELOR 90 MG/1
90 TABLET ORAL 2 TIMES DAILY
Qty: 180 TABLET | Refills: 3 | Status: SHIPPED | OUTPATIENT
Start: 2024-04-22

## 2024-04-22 NOTE — CARE COORDINATION
Care Transitions Follow Up Call    Patient Current Location:  Home:  [facility] [address]    Care Transition Nurse contacted the patient by telephone to follow up after admission on 24.  Verified name and  with patient as identifiers.    Patient: Garrett Duncan  Patient : 1965   MRN: [id number]  Reason for Admission: anemia, fatigue CHF  Discharge Date: 24 RARS: Readmission Risk Score: 37      Needs to be reviewed by the provider   Additional needs identified to be addressed with provider: No  none             Method of communication with provider: none.    CTN call to Garrett today and he says he is feeling alright. Denies hemoptysis, bleeding, sob, chest pain, fatigue, n/v/d, dizziness, fever, chills.  C/o swelling in feet- says always has this. Wearing compression socks during the day.  BLOOD SUGAR=140  Says missed last [facility] appt., will call to RS asap. Discussed importance of keeping appts. and risks of bleeding, etc. When taking a blood thinner. Says he is aware  and will seeking ER medical attention for new or worsening sxs.  RS Cardio HFU to 24 d/t work schedule.  Eating, drinking & sleeping ok. Denies problems w/ urination/bowels. No other concerns voiced at this time.  Agrees to CTN call next week.    Addressed changes since last contact:  labs-due for INR  Discussed follow-up appointments. If no appointment was previously scheduled, appointment scheduling offered: Yes.   Is follow up appointment scheduled within 7 days of discharge? Yes.    Follow Up  Future Appointments   Date Time Provider Department Center   2024  3:30 PM Mortimer, Connor, PA-C [facility] [place]   2024  3:00 PM Samuel Kathleen MD [facility] - [place]   2024  3:30 PM [facility] PULMONARY FUNCTION ROOM 1 [facility] [place] [facility]   2024  3:30 PM Leonel Adorno, APRN - CNP [facility] [place] [facility] [place]   2024  3:30 PM Shelly Johnson MD N [facility] Heart P - [place]   2025

## 2024-04-23 DIAGNOSIS — I10 ESSENTIAL HYPERTENSION: ICD-10-CM

## 2024-04-23 RX ORDER — DICYCLOMINE HYDROCHLORIDE 10 MG/1
10 CAPSULE ORAL
Qty: 360 CAPSULE | Refills: 0 | Status: SHIPPED | OUTPATIENT
Start: 2024-04-23

## 2024-04-23 RX ORDER — HYDRALAZINE HYDROCHLORIDE 100 MG/1
100 TABLET, FILM COATED ORAL 3 TIMES DAILY
Qty: 270 TABLET | Refills: 3 | Status: SHIPPED | OUTPATIENT
Start: 2024-04-23 | End: 2024-06-22

## 2024-04-23 NOTE — TELEPHONE ENCOUNTER
Patient called and stated when he was in the hospital he was placed on dicyclomine 10 mg and he takes it 3 times a day.   He would like a 90 day supply sent into [facility].     He also mentioned he needs a refill on hydralazine but he has been going around and around with Dr. Dempsey office for the refill, he stated that he was on it before the hospital he was on 50 mg 3 times a day. But when he was in the hospital they up the dose to 100 mg- 3 times a day.   He didn't know if you would be able to refill it for him or not.       He will check with [facility] around 4:00 Pm today

## 2024-04-25 ENCOUNTER — APPOINTMENT (OUTPATIENT)
Dept: GENERAL RADIOLOGY | Age: 59
End: 2024-04-25
Payer: COMMERCIAL

## 2024-04-25 ENCOUNTER — APPOINTMENT (OUTPATIENT)
Dept: CT IMAGING | Age: 59
End: 2024-04-25
Payer: COMMERCIAL

## 2024-04-25 ENCOUNTER — HOSPITAL ENCOUNTER (OUTPATIENT)
Age: 59
Setting detail: OBSERVATION
Discharge: HOME | End: 2024-04-27
Attending: PHYSICIAN ASSISTANT
Payer: COMMERCIAL

## 2024-04-25 DIAGNOSIS — N18.4 CKD (CHRONIC KIDNEY DISEASE), STAGE IV (HCC): ICD-10-CM

## 2024-04-25 DIAGNOSIS — I10 ESSENTIAL HYPERTENSION: ICD-10-CM

## 2024-04-25 DIAGNOSIS — M54.50 CHRONIC BILATERAL LOW BACK PAIN, UNSPECIFIED WHETHER SCIATICA PRESENT: ICD-10-CM

## 2024-04-25 DIAGNOSIS — G89.29 CHRONIC BILATERAL LOW BACK PAIN, UNSPECIFIED WHETHER SCIATICA PRESENT: ICD-10-CM

## 2024-04-25 DIAGNOSIS — I50.9 ACUTE ON CHRONIC CONGESTIVE HEART FAILURE, UNSPECIFIED HEART FAILURE TYPE (HCC): Primary | ICD-10-CM

## 2024-04-25 PROBLEM — I50.23 ACUTE ON CHRONIC SYSTOLIC (CONGESTIVE) HEART FAILURE (HCC): Status: ACTIVE | Noted: 2024-04-25

## 2024-04-25 LAB
ALBUMIN SERPL BCG-MCNC: 3.7 G/DL (ref 3.5–5.1)
ALP SERPL-CCNC: 96 U/L (ref 38–126)
ALT SERPL W/O P-5'-P-CCNC: 10 U/L (ref 11–66)
AMMONIA PLAS-MCNC: 15 UMOL/L (ref 11–60)
ANION GAP SERPL CALC-SCNC: 13 MEQ/L (ref 8–16)
AST SERPL-CCNC: 12 U/L (ref 5–40)
BACTERIA URNS QL MICRO: ABNORMAL /HPF
BASOPHILS ABSOLUTE: 0 THOU/MM3 (ref 0–0.1)
BASOPHILS NFR BLD AUTO: 0.6 %
BILIRUB SERPL-MCNC: 0.4 MG/DL (ref 0.3–1.2)
BILIRUB UR QL STRIP.AUTO: NEGATIVE
BUN SERPL-MCNC: 40 MG/DL (ref 7–22)
CALCIUM SERPL-MCNC: 9.2 MG/DL (ref 8.5–10.5)
CASTS #/AREA URNS LPF: ABNORMAL /LPF
CASTS 2: ABNORMAL /LPF
CHARACTER UR: CLEAR
CHLORIDE SERPL-SCNC: 105 MEQ/L (ref 98–111)
CO2 SERPL-SCNC: 20 MEQ/L (ref 23–33)
COLOR: YELLOW
CREAT SERPL-MCNC: 3.7 MG/DL (ref 0.4–1.2)
CRYSTALS URNS MICRO: ABNORMAL
DEPRECATED RDW RBC AUTO: 62.1 FL (ref 35–45)
EKG ATRIAL RATE: 68 BPM
EKG P AXIS: 58 DEGREES
EKG P-R INTERVAL: 208 MS
EKG Q-T INTERVAL: 438 MS
EKG QRS DURATION: 100 MS
EKG QTC CALCULATION (BAZETT): 465 MS
EKG R AXIS: 2 DEGREES
EKG T AXIS: 120 DEGREES
EKG VENTRICULAR RATE: 68 BPM
EOSINOPHIL NFR BLD AUTO: 2.4 %
EOSINOPHILS ABSOLUTE: 0.2 THOU/MM3 (ref 0–0.4)
EPITHELIAL CELLS, UA: ABNORMAL /HPF
ERYTHROCYTE [DISTWIDTH] IN BLOOD BY AUTOMATED COUNT: 17.5 % (ref 11.5–14.5)
ETHANOL SERPL-MCNC: < 0.01 %
GFR SERPL CREATININE-BSD FRML MDRD: 18 ML/MIN/1.73M2
GLUCOSE BLD STRIP.AUTO-MCNC: 123 MG/DL (ref 70–108)
GLUCOSE BLD STRIP.AUTO-MCNC: 128 MG/DL (ref 70–108)
GLUCOSE SERPL-MCNC: 199 MG/DL (ref 70–108)
GLUCOSE UR QL STRIP.AUTO: 500 MG/DL
HCT VFR BLD AUTO: 26.2 % (ref 42–52)
HGB BLD-MCNC: 7.8 GM/DL (ref 14–18)
HGB UR QL STRIP.AUTO: NEGATIVE
IMM GRANULOCYTES # BLD AUTO: 0.04 THOU/MM3 (ref 0–0.07)
IMM GRANULOCYTES NFR BLD AUTO: 0.5 %
INR PPP: 3.1 (ref 0.85–1.13)
KETONES UR QL STRIP.AUTO: NEGATIVE
LIPASE SERPL-CCNC: 56.4 U/L (ref 5.6–51.3)
LYMPHOCYTES ABSOLUTE: 0.4 THOU/MM3 (ref 1–4.8)
LYMPHOCYTES NFR BLD AUTO: 5.1 %
MAGNESIUM SERPL-MCNC: 2.7 MG/DL (ref 1.6–2.4)
MCH RBC QN AUTO: 28.7 PG (ref 26–33)
MCHC RBC AUTO-ENTMCNC: 29.8 GM/DL (ref 32.2–35.5)
MCV RBC AUTO: 96.3 FL (ref 80–94)
MISCELLANEOUS 2: ABNORMAL
MONOCYTES ABSOLUTE: 0.7 THOU/MM3 (ref 0.4–1.3)
MONOCYTES NFR BLD AUTO: 8.8 %
NEUTROPHILS NFR BLD AUTO: 82.6 %
NITRITE UR QL STRIP: NEGATIVE
NRBC BLD AUTO-RTO: 0 /100 WBC
NT-PROBNP SERPL IA-MCNC: ABNORMAL PG/ML (ref 0–124)
OSMOLALITY SERPL CALC.SUM OF ELEC: 291 MOSMOL/KG (ref 275–300)
PH UR STRIP.AUTO: 6 [PH] (ref 5–9)
PLATELET # BLD AUTO: 191 THOU/MM3 (ref 130–400)
PMV BLD AUTO: 10.7 FL (ref 9.4–12.4)
POTASSIUM SERPL-SCNC: 4 MEQ/L (ref 3.5–5.2)
PROT SERPL-MCNC: 6.4 G/DL (ref 6.1–8)
PROT UR STRIP.AUTO-MCNC: 100 MG/DL
RBC # BLD AUTO: 2.72 MILL/MM3 (ref 4.7–6.1)
RBC URINE: ABNORMAL /HPF
RENAL EPI CELLS #/AREA URNS HPF: ABNORMAL /[HPF]
SEGMENTED NEUTROPHILS ABSOLUTE COUNT: 6.6 THOU/MM3 (ref 1.8–7.7)
SODIUM SERPL-SCNC: 138 MEQ/L (ref 135–145)
SP GR UR REFRACT.AUTO: 1.01 (ref 1–1.03)
TROPONIN, HIGH SENSITIVITY: 152 NG/L (ref 0–12)
TROPONIN, HIGH SENSITIVITY: 154 NG/L (ref 0–12)
UROBILINOGEN, URINE: 0.2 EU/DL (ref 0–1)
WBC # BLD AUTO: 8 THOU/MM3 (ref 4.8–10.8)
WBC #/AREA URNS HPF: ABNORMAL /HPF
WBC #/AREA URNS HPF: NEGATIVE /[HPF]
YEAST LIKE FUNGI URNS QL MICRO: ABNORMAL

## 2024-04-25 PROCEDURE — 83735 ASSAY OF MAGNESIUM: CPT

## 2024-04-25 PROCEDURE — 85610 PROTHROMBIN TIME: CPT

## 2024-04-25 PROCEDURE — 93010 ELECTROCARDIOGRAM REPORT: CPT | Performed by: NUCLEAR MEDICINE

## 2024-04-25 PROCEDURE — G0378 HOSPITAL OBSERVATION PER HR: HCPCS

## 2024-04-25 PROCEDURE — 80053 COMPREHEN METABOLIC PANEL: CPT

## 2024-04-25 PROCEDURE — 85025 COMPLETE CBC W/AUTO DIFF WBC: CPT

## 2024-04-25 PROCEDURE — 96374 THER/PROPH/DIAG INJ IV PUSH: CPT

## 2024-04-25 PROCEDURE — 99285 EMERGENCY DEPT VISIT HI MDM: CPT

## 2024-04-25 PROCEDURE — 6360000002 HC RX W HCPCS: Performed by: PHYSICIAN ASSISTANT

## 2024-04-25 PROCEDURE — 6360000002 HC RX W HCPCS

## 2024-04-25 PROCEDURE — 93005 ELECTROCARDIOGRAM TRACING: CPT | Performed by: PHYSICIAN ASSISTANT

## 2024-04-25 PROCEDURE — 2580000003 HC RX 258: Performed by: PHYSICIAN ASSISTANT

## 2024-04-25 PROCEDURE — 81001 URINALYSIS AUTO W/SCOPE: CPT

## 2024-04-25 PROCEDURE — 82140 ASSAY OF AMMONIA: CPT

## 2024-04-25 PROCEDURE — 82948 REAGENT STRIP/BLOOD GLUCOSE: CPT

## 2024-04-25 PROCEDURE — 84484 ASSAY OF TROPONIN QUANT: CPT

## 2024-04-25 PROCEDURE — 99223 1ST HOSP IP/OBS HIGH 75: CPT | Performed by: PHYSICIAN ASSISTANT

## 2024-04-25 PROCEDURE — 73502 X-RAY EXAM HIP UNI 2-3 VIEWS: CPT

## 2024-04-25 PROCEDURE — 83880 ASSAY OF NATRIURETIC PEPTIDE: CPT

## 2024-04-25 PROCEDURE — 36415 COLL VENOUS BLD VENIPUNCTURE: CPT

## 2024-04-25 PROCEDURE — 96375 TX/PRO/DX INJ NEW DRUG ADDON: CPT

## 2024-04-25 PROCEDURE — 74176 CT ABD & PELVIS W/O CONTRAST: CPT

## 2024-04-25 PROCEDURE — 83690 ASSAY OF LIPASE: CPT

## 2024-04-25 PROCEDURE — 82077 ASSAY SPEC XCP UR&BREATH IA: CPT

## 2024-04-25 PROCEDURE — 6370000000 HC RX 637 (ALT 250 FOR IP)

## 2024-04-25 PROCEDURE — 6370000000 HC RX 637 (ALT 250 FOR IP): Performed by: PHYSICIAN ASSISTANT

## 2024-04-25 PROCEDURE — 71045 X-RAY EXAM CHEST 1 VIEW: CPT

## 2024-04-25 RX ORDER — ACETAMINOPHEN 325 MG/1
650 TABLET ORAL EVERY 6 HOURS PRN
Status: DISCONTINUED | OUTPATIENT
Start: 2024-04-25 | End: 2024-04-27 | Stop reason: HOSPADM

## 2024-04-25 RX ORDER — ACETAMINOPHEN 650 MG/1
650 SUPPOSITORY RECTAL EVERY 6 HOURS PRN
Status: DISCONTINUED | OUTPATIENT
Start: 2024-04-25 | End: 2024-04-27 | Stop reason: HOSPADM

## 2024-04-25 RX ORDER — DEXTROSE MONOHYDRATE 100 MG/ML
INJECTION, SOLUTION INTRAVENOUS CONTINUOUS PRN
Status: DISCONTINUED | OUTPATIENT
Start: 2024-04-25 | End: 2024-04-27 | Stop reason: HOSPADM

## 2024-04-25 RX ORDER — MEXILETINE HYDROCHLORIDE 150 MG/1
300 CAPSULE ORAL EVERY 8 HOURS SCHEDULED
Status: DISCONTINUED | OUTPATIENT
Start: 2024-04-25 | End: 2024-04-27 | Stop reason: HOSPADM

## 2024-04-25 RX ORDER — MORPHINE SULFATE 4 MG/ML
4 INJECTION, SOLUTION INTRAMUSCULAR; INTRAVENOUS
Status: COMPLETED | OUTPATIENT
Start: 2024-04-25 | End: 2024-04-25

## 2024-04-25 RX ORDER — LIDOCAINE 4 G/G
2 PATCH TOPICAL DAILY
Status: DISCONTINUED | OUTPATIENT
Start: 2024-04-26 | End: 2024-04-25

## 2024-04-25 RX ORDER — BUMETANIDE 1 MG/1
2 TABLET ORAL DAILY
Status: DISCONTINUED | OUTPATIENT
Start: 2024-04-26 | End: 2024-04-27 | Stop reason: HOSPADM

## 2024-04-25 RX ORDER — INSULIN LISPRO 100 [IU]/ML
0-4 INJECTION, SOLUTION INTRAVENOUS; SUBCUTANEOUS
Status: DISCONTINUED | OUTPATIENT
Start: 2024-04-25 | End: 2024-04-27 | Stop reason: HOSPADM

## 2024-04-25 RX ORDER — ISOSORBIDE MONONITRATE 60 MG/1
120 TABLET, EXTENDED RELEASE ORAL DAILY
Status: DISCONTINUED | OUTPATIENT
Start: 2024-04-26 | End: 2024-04-27 | Stop reason: HOSPADM

## 2024-04-25 RX ORDER — METOPROLOL SUCCINATE 100 MG/1
100 TABLET, EXTENDED RELEASE ORAL DAILY
Status: DISCONTINUED | OUTPATIENT
Start: 2024-04-25 | End: 2024-04-27 | Stop reason: HOSPADM

## 2024-04-25 RX ORDER — LIDOCAINE 4 G/G
2 PATCH TOPICAL DAILY
Status: DISCONTINUED | OUTPATIENT
Start: 2024-04-25 | End: 2024-04-27 | Stop reason: HOSPADM

## 2024-04-25 RX ORDER — BUMETANIDE 0.25 MG/ML
2 INJECTION INTRAMUSCULAR; INTRAVENOUS ONCE
Status: COMPLETED | OUTPATIENT
Start: 2024-04-25 | End: 2024-04-25

## 2024-04-25 RX ORDER — GLUCAGON 1 MG/ML
1 KIT INJECTION PRN
Status: DISCONTINUED | OUTPATIENT
Start: 2024-04-25 | End: 2024-04-27 | Stop reason: HOSPADM

## 2024-04-25 RX ORDER — SODIUM CHLORIDE 0.9 % (FLUSH) 0.9 %
5-40 SYRINGE (ML) INJECTION EVERY 12 HOURS SCHEDULED
Status: DISCONTINUED | OUTPATIENT
Start: 2024-04-25 | End: 2024-04-27 | Stop reason: HOSPADM

## 2024-04-25 RX ORDER — POLYETHYLENE GLYCOL 3350 17 G/17G
17 POWDER, FOR SOLUTION ORAL DAILY PRN
Status: DISCONTINUED | OUTPATIENT
Start: 2024-04-25 | End: 2024-04-27 | Stop reason: HOSPADM

## 2024-04-25 RX ORDER — ONDANSETRON 2 MG/ML
4 INJECTION INTRAMUSCULAR; INTRAVENOUS ONCE
Status: COMPLETED | OUTPATIENT
Start: 2024-04-25 | End: 2024-04-25

## 2024-04-25 RX ORDER — AMIODARONE HYDROCHLORIDE 200 MG/1
200 TABLET ORAL DAILY
Status: DISCONTINUED | OUTPATIENT
Start: 2024-04-26 | End: 2024-04-27 | Stop reason: HOSPADM

## 2024-04-25 RX ORDER — SODIUM CHLORIDE 0.9 % (FLUSH) 0.9 %
5-40 SYRINGE (ML) INJECTION PRN
Status: DISCONTINUED | OUTPATIENT
Start: 2024-04-25 | End: 2024-04-27 | Stop reason: HOSPADM

## 2024-04-25 RX ORDER — DICYCLOMINE HYDROCHLORIDE 10 MG/1
10 CAPSULE ORAL
Status: DISCONTINUED | OUTPATIENT
Start: 2024-04-25 | End: 2024-04-27 | Stop reason: HOSPADM

## 2024-04-25 RX ORDER — ATORVASTATIN CALCIUM 40 MG/1
40 TABLET, FILM COATED ORAL NIGHTLY
Status: DISCONTINUED | OUTPATIENT
Start: 2024-04-25 | End: 2024-04-27 | Stop reason: HOSPADM

## 2024-04-25 RX ORDER — WARFARIN SODIUM 2.5 MG/1
2.5 TABLET ORAL
Status: COMPLETED | OUTPATIENT
Start: 2024-04-25 | End: 2024-04-25

## 2024-04-25 RX ORDER — FERROUS SULFATE 325(65) MG
325 TABLET ORAL EVERY OTHER DAY
Status: DISCONTINUED | OUTPATIENT
Start: 2024-04-26 | End: 2024-04-25

## 2024-04-25 RX ORDER — SODIUM CHLORIDE 9 MG/ML
INJECTION, SOLUTION INTRAVENOUS PRN
Status: DISCONTINUED | OUTPATIENT
Start: 2024-04-25 | End: 2024-04-27 | Stop reason: HOSPADM

## 2024-04-25 RX ORDER — ONDANSETRON 4 MG/1
4 TABLET, ORALLY DISINTEGRATING ORAL EVERY 8 HOURS PRN
Status: DISCONTINUED | OUTPATIENT
Start: 2024-04-25 | End: 2024-04-27 | Stop reason: HOSPADM

## 2024-04-25 RX ORDER — HYDRALAZINE HYDROCHLORIDE 50 MG/1
100 TABLET, FILM COATED ORAL 3 TIMES DAILY
Status: DISCONTINUED | OUTPATIENT
Start: 2024-04-25 | End: 2024-04-27 | Stop reason: HOSPADM

## 2024-04-25 RX ORDER — INSULIN GLARGINE 100 [IU]/ML
10 INJECTION, SOLUTION SUBCUTANEOUS DAILY
Status: DISCONTINUED | OUTPATIENT
Start: 2024-04-26 | End: 2024-04-27 | Stop reason: HOSPADM

## 2024-04-25 RX ORDER — ALPRAZOLAM 0.5 MG/1
0.5 TABLET ORAL NIGHTLY PRN
Status: DISCONTINUED | OUTPATIENT
Start: 2024-04-25 | End: 2024-04-27 | Stop reason: HOSPADM

## 2024-04-25 RX ORDER — LANOLIN ALCOHOL/MO/W.PET/CERES
100 CREAM (GRAM) TOPICAL DAILY
Status: DISCONTINUED | OUTPATIENT
Start: 2024-04-26 | End: 2024-04-27 | Stop reason: HOSPADM

## 2024-04-25 RX ORDER — ONDANSETRON 2 MG/ML
4 INJECTION INTRAMUSCULAR; INTRAVENOUS EVERY 6 HOURS PRN
Status: DISCONTINUED | OUTPATIENT
Start: 2024-04-25 | End: 2024-04-27 | Stop reason: HOSPADM

## 2024-04-25 RX ORDER — INSULIN LISPRO 100 [IU]/ML
0-4 INJECTION, SOLUTION INTRAVENOUS; SUBCUTANEOUS NIGHTLY
Status: DISCONTINUED | OUTPATIENT
Start: 2024-04-25 | End: 2024-04-27 | Stop reason: HOSPADM

## 2024-04-25 RX ADMIN — DICYCLOMINE HYDROCHLORIDE 10 MG: 10 CAPSULE ORAL at 22:22

## 2024-04-25 RX ADMIN — ALPRAZOLAM 0.5 MG: 0.5 TABLET ORAL at 22:07

## 2024-04-25 RX ADMIN — METOPROLOL SUCCINATE 100 MG: 100 TABLET, EXTENDED RELEASE ORAL at 22:06

## 2024-04-25 RX ADMIN — MEXILETINE HYDROCHLORIDE 300 MG: 150 CAPSULE ORAL at 22:07

## 2024-04-25 RX ADMIN — ATORVASTATIN CALCIUM 40 MG: 40 TABLET, FILM COATED ORAL at 22:07

## 2024-04-25 RX ADMIN — TICAGRELOR 90 MG: 90 TABLET ORAL at 22:23

## 2024-04-25 RX ADMIN — HYDRALAZINE HYDROCHLORIDE 100 MG: 50 TABLET ORAL at 22:06

## 2024-04-25 RX ADMIN — SODIUM CHLORIDE, PRESERVATIVE FREE 10 ML: 5 INJECTION INTRAVENOUS at 22:07

## 2024-04-25 RX ADMIN — ONDANSETRON 4 MG: 2 INJECTION INTRAMUSCULAR; INTRAVENOUS at 17:59

## 2024-04-25 RX ADMIN — WARFARIN SODIUM 2.5 MG: 2.5 TABLET ORAL at 22:06

## 2024-04-25 RX ADMIN — MORPHINE SULFATE 4 MG: 4 INJECTION, SOLUTION INTRAMUSCULAR; INTRAVENOUS at 18:00

## 2024-04-25 RX ADMIN — BUMETANIDE 2 MG: 0.25 INJECTION INTRAMUSCULAR; INTRAVENOUS at 22:23

## 2024-04-25 ASSESSMENT — PAIN DESCRIPTION - LOCATION
LOCATION: LEG;HIP
LOCATION: ABDOMEN
LOCATION: LEG;HIP
LOCATION: ABDOMEN
LOCATION: ABDOMEN

## 2024-04-25 ASSESSMENT — PAIN SCALES - GENERAL
PAINLEVEL_OUTOF10: 8
PAINLEVEL_OUTOF10: 8
PAINLEVEL_OUTOF10: 6
PAINLEVEL_OUTOF10: 5
PAINLEVEL_OUTOF10: 8

## 2024-04-25 ASSESSMENT — PAIN DESCRIPTION - ORIENTATION
ORIENTATION: RIGHT;LEFT
ORIENTATION: LEFT;RIGHT

## 2024-04-25 ASSESSMENT — PAIN DESCRIPTION - PAIN TYPE
TYPE: CHRONIC PAIN
TYPE: ACUTE PAIN

## 2024-04-25 ASSESSMENT — PAIN DESCRIPTION - ONSET
ONSET: ON-GOING
ONSET: ON-GOING

## 2024-04-25 ASSESSMENT — PAIN DESCRIPTION - DESCRIPTORS
DESCRIPTORS: ACHING
DESCRIPTORS: ACHING;PRESSURE

## 2024-04-25 ASSESSMENT — PAIN DESCRIPTION - FREQUENCY
FREQUENCY: CONTINUOUS
FREQUENCY: CONTINUOUS

## 2024-04-25 NOTE — ED NOTES
ED to inpatient nurses report      Chief Complaint:  Chief Complaint   Patient presents with    Abdominal Pain             Shortness of Breath     Present to ED from: home    MOA:     LOC: alert and orientated to name, place, date  Mobility: Independent  Oxygen Baseline: 95    Current needs required: room air     Code Status:   Prior      Mental Status:  Level of Consciousness: Alert (0)    Psych Assessment:        Vitals:  Patient Vitals for the past 24 hrs:   BP Temp Pulse Resp SpO2 Height Weight   04/25/24 1835 (!) 164/80 -- 60 10 95 % -- --   04/25/24 1804 (!) 139/106 -- 62 20 95 % -- --   04/25/24 1658 (!) 158/83 -- 61 13 95 % -- --   04/25/24 1532 (!) 147/69 98.1 °F (36.7 °C) 66 19 94 % 1.88 m (6' 2\") 114.3 kg (252 lb)        LDAs:   Peripheral IV 04/25/24 Distal;Left;Anterior Antecubital (Active)   Site Assessment Clean, dry & intact 04/25/24 1759   Line Status Blood return noted;Flushed 04/25/24 1759   Phlebitis Assessment No symptoms 04/25/24 1759   Infiltration Assessment 0 04/25/24 1759   Dressing Status Clean, dry & intact 04/25/24 1759   Dressing Type Transparent 04/25/24 1759       Ambulatory Status:  No data recorded    Diagnosis:  DISPOSITION Admitted 04/25/2024 06:58:15 PM   Final diagnoses:   None        Consults:  None     Pain Score:  Pain Assessment  Pain Assessment: 0-10  Pain Level: 5  Patient's Stated Pain Goal: 2  Pain Location: Abdomen  Pain Descriptors: Aching, Pressure  Pain Type: Acute pain  Pain Frequency: Continuous  Pain Onset: On-going    C-SSRS:        Sepsis Screening:  Sepsis Risk Score: 2.37    [place] Fall Risk:       Swallow Screening        Preferred Language:   English      ALLERGIES     Latex, Ativan [lorazepam], Pcn [penicillins], and Zoloft [sertraline hcl]    SURGICAL HISTORY       Past Surgical History:   Procedure Laterality Date    CARDIAC CATHETERIZATION  03/20/2014    [facility]    CARDIAC DEFIBRILLATOR PLACEMENT  10/13/2015    [facility], MRI CONDITIONAL ICD

## 2024-04-25 NOTE — ED PROVIDER NOTES
[facility]      EMERGENCY MEDICINE     Pt Name: Garrett Duncan  MRN: [id number]  Birthdate 1965  Date of evaluation: 4/25/2024  Provider: Asaf Hussein PA-C    CHIEF COMPLAINT       Chief Complaint   Patient presents with    Abdominal Pain             Shortness of Breath     HISTORY OF PRESENT ILLNESS   Garrett Duncan is a 58 y.o. male who presents to the ED for evaluation of shortness of breath and generalized abdominal pain onset several days.  Patient states that he has a history of congestive heart failure and associated shortness of breath.  Patient states that recently he has been having worsening generalized weakness, shortness of breath, and abdominal pain.  Patient denies fever, chills, headache, cough, chest pain, vomiting, diarrhea, dysuria, neck pain, and lightheadedness.        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    CAD (coronary artery disease) 03/25/2014    s/p CABG in may 2014    Chronic kidney disease     Diabetes mellitus (Formerly [facility])     Drug abuse (Formerly [facility])     hx of cacaine abuse, stopped abusing drugs in 2012    GERD (gastroesophageal reflux disease)     History of implantable cardiac defibrillator (ICD)     Hypertension     Intradural extramedullary spinal tumor     Long term (current) use of anticoagulants 08/29/2019    Medtronic dual icd      Neuromuscular disorder (Formerly [facility])     Paroxysmal atrial fibrillation (Formerly [facility]) 07/22/2014    Severe obesity (BMI 35.0-39.9) with comorbidity (Formerly [facility]) 05/22/2023    V-tach (Formerly [facility])     s/p ablation in dec 2014       Patient Active Problem List   Diagnosis Code    Coronary artery disease involving native coronary artery of native heart with unstable angina pectoris (Formerly [facility]) I25.110    Depression F32.A    Hyperlipidemia E78.5    Tobacco abuse Z72.0    Paroxysmal atrial fibrillation (Formerly [facility]) I48.0    Urinary

## 2024-04-25 NOTE — ED TRIAGE NOTES
Pt presents to the ED through lobby with c/o abdominal pain and SOB. Pt states he has had these symptoms before and had to have fluid drained. States SOB is worse when he is lying down flat. EKG complete

## 2024-04-26 LAB
ANION GAP SERPL CALC-SCNC: 15 MEQ/L (ref 8–16)
BASOPHILS ABSOLUTE: 0.1 THOU/MM3 (ref 0–0.1)
BASOPHILS NFR BLD AUTO: 0.9 %
BUN SERPL-MCNC: 41 MG/DL (ref 7–22)
CALCIUM SERPL-MCNC: 8.7 MG/DL (ref 8.5–10.5)
CHLORIDE SERPL-SCNC: 108 MEQ/L (ref 98–111)
CO2 SERPL-SCNC: 21 MEQ/L (ref 23–33)
CREAT SERPL-MCNC: 3.8 MG/DL (ref 0.4–1.2)
DEPRECATED RDW RBC AUTO: 61.7 FL (ref 35–45)
EOSINOPHIL NFR BLD AUTO: 4.6 %
EOSINOPHILS ABSOLUTE: 0.3 THOU/MM3 (ref 0–0.4)
ERYTHROCYTE [DISTWIDTH] IN BLOOD BY AUTOMATED COUNT: 17.6 % (ref 11.5–14.5)
GFR SERPL CREATININE-BSD FRML MDRD: 17 ML/MIN/1.73M2
GLUCOSE BLD STRIP.AUTO-MCNC: 112 MG/DL (ref 70–108)
GLUCOSE BLD STRIP.AUTO-MCNC: 209 MG/DL (ref 70–108)
GLUCOSE BLD STRIP.AUTO-MCNC: 249 MG/DL (ref 70–108)
GLUCOSE BLD STRIP.AUTO-MCNC: 258 MG/DL (ref 70–108)
GLUCOSE SERPL-MCNC: 99 MG/DL (ref 70–108)
HCT VFR BLD AUTO: 26.4 % (ref 42–52)
HGB BLD-MCNC: 7.8 GM/DL (ref 14–18)
IMM GRANULOCYTES # BLD AUTO: 0.04 THOU/MM3 (ref 0–0.07)
IMM GRANULOCYTES NFR BLD AUTO: 0.6 %
INR PPP: 3.07 (ref 0.85–1.13)
LYMPHOCYTES ABSOLUTE: 0.5 THOU/MM3 (ref 1–4.8)
LYMPHOCYTES NFR BLD AUTO: 8.3 %
MCH RBC QN AUTO: 28.8 PG (ref 26–33)
MCHC RBC AUTO-ENTMCNC: 29.5 GM/DL (ref 32.2–35.5)
MCV RBC AUTO: 97.4 FL (ref 80–94)
MONOCYTES ABSOLUTE: 0.7 THOU/MM3 (ref 0.4–1.3)
MONOCYTES NFR BLD AUTO: 10.4 %
NEUTROPHILS NFR BLD AUTO: 75.2 %
NRBC BLD AUTO-RTO: 0 /100 WBC
OSMOLALITY SERPL CALC.SUM OF ELEC: 297 MOSMOL/KG (ref 275–300)
PLATELET # BLD AUTO: 184 THOU/MM3 (ref 130–400)
PMV BLD AUTO: 10.8 FL (ref 9.4–12.4)
POTASSIUM SERPL-SCNC: 4.5 MEQ/L (ref 3.5–5.2)
RBC # BLD AUTO: 2.71 MILL/MM3 (ref 4.7–6.1)
SEGMENTED NEUTROPHILS ABSOLUTE COUNT: 4.8 THOU/MM3 (ref 1.8–7.7)
SODIUM SERPL-SCNC: 144 MEQ/L (ref 135–145)
WBC # BLD AUTO: 6.4 THOU/MM3 (ref 4.8–10.8)

## 2024-04-26 PROCEDURE — 99233 SBSQ HOSP IP/OBS HIGH 50: CPT | Performed by: HOSPITALIST

## 2024-04-26 PROCEDURE — 6370000000 HC RX 637 (ALT 250 FOR IP)

## 2024-04-26 PROCEDURE — 85025 COMPLETE CBC W/AUTO DIFF WBC: CPT

## 2024-04-26 PROCEDURE — 36415 COLL VENOUS BLD VENIPUNCTURE: CPT

## 2024-04-26 PROCEDURE — 2580000003 HC RX 258: Performed by: PHYSICIAN ASSISTANT

## 2024-04-26 PROCEDURE — 6370000000 HC RX 637 (ALT 250 FOR IP): Performed by: PHYSICIAN ASSISTANT

## 2024-04-26 PROCEDURE — 80048 BASIC METABOLIC PNL TOTAL CA: CPT

## 2024-04-26 PROCEDURE — 85610 PROTHROMBIN TIME: CPT

## 2024-04-26 PROCEDURE — G0378 HOSPITAL OBSERVATION PER HR: HCPCS

## 2024-04-26 PROCEDURE — 6360000002 HC RX W HCPCS

## 2024-04-26 PROCEDURE — 82948 REAGENT STRIP/BLOOD GLUCOSE: CPT

## 2024-04-26 PROCEDURE — 6370000000 HC RX 637 (ALT 250 FOR IP): Performed by: STUDENT IN AN ORGANIZED HEALTH CARE EDUCATION/TRAINING PROGRAM

## 2024-04-26 RX ORDER — MORPHINE SULFATE 2 MG/ML
2 INJECTION, SOLUTION INTRAMUSCULAR; INTRAVENOUS ONCE
Status: COMPLETED | OUTPATIENT
Start: 2024-04-26 | End: 2024-04-26

## 2024-04-26 RX ORDER — WARFARIN SODIUM 2.5 MG/1
2.5 TABLET ORAL ONCE
Status: COMPLETED | OUTPATIENT
Start: 2024-04-26 | End: 2024-04-26

## 2024-04-26 RX ORDER — GABAPENTIN 600 MG/1
300 TABLET ORAL 2 TIMES DAILY
Status: DISCONTINUED | OUTPATIENT
Start: 2024-04-26 | End: 2024-04-27 | Stop reason: HOSPADM

## 2024-04-26 RX ORDER — ACETAMINOPHEN 500 MG
1000 TABLET ORAL EVERY 6 HOURS PRN
Status: DISCONTINUED | OUTPATIENT
Start: 2024-04-26 | End: 2024-04-27 | Stop reason: HOSPADM

## 2024-04-26 RX ORDER — POLYETHYLENE GLYCOL 3350 17 G
2 POWDER IN PACKET (EA) ORAL
Status: DISCONTINUED | OUTPATIENT
Start: 2024-04-26 | End: 2024-04-27 | Stop reason: HOSPADM

## 2024-04-26 RX ORDER — HYDROCODONE BITARTRATE AND ACETAMINOPHEN 5; 325 MG/1; MG/1
1 TABLET ORAL ONCE
Status: COMPLETED | OUTPATIENT
Start: 2024-04-26 | End: 2024-04-26

## 2024-04-26 RX ORDER — GABAPENTIN 600 MG/1
300 TABLET ORAL 3 TIMES DAILY
Status: DISCONTINUED | OUTPATIENT
Start: 2024-04-26 | End: 2024-04-26

## 2024-04-26 RX ADMIN — DICYCLOMINE HYDROCHLORIDE 10 MG: 10 CAPSULE ORAL at 06:04

## 2024-04-26 RX ADMIN — SODIUM CHLORIDE, PRESERVATIVE FREE 10 ML: 5 INJECTION INTRAVENOUS at 10:41

## 2024-04-26 RX ADMIN — Medication 100 MG: at 10:26

## 2024-04-26 RX ADMIN — WARFARIN SODIUM 2.5 MG: 2.5 TABLET ORAL at 20:19

## 2024-04-26 RX ADMIN — HYDRALAZINE HYDROCHLORIDE 100 MG: 50 TABLET ORAL at 20:01

## 2024-04-26 RX ADMIN — INSULIN LISPRO 1 UNITS: 100 INJECTION, SOLUTION INTRAVENOUS; SUBCUTANEOUS at 13:32

## 2024-04-26 RX ADMIN — MORPHINE SULFATE 2 MG: 2 INJECTION, SOLUTION INTRAMUSCULAR; INTRAVENOUS at 10:30

## 2024-04-26 RX ADMIN — MEXILETINE HYDROCHLORIDE 300 MG: 150 CAPSULE ORAL at 13:21

## 2024-04-26 RX ADMIN — EMPAGLIFLOZIN 10 MG: 10 TABLET, FILM COATED ORAL at 20:16

## 2024-04-26 RX ADMIN — MEXILETINE HYDROCHLORIDE 300 MG: 150 CAPSULE ORAL at 21:46

## 2024-04-26 RX ADMIN — BUMETANIDE 2 MG: 1 TABLET ORAL at 10:26

## 2024-04-26 RX ADMIN — DICYCLOMINE HYDROCHLORIDE 10 MG: 10 CAPSULE ORAL at 20:16

## 2024-04-26 RX ADMIN — ISOSORBIDE MONONITRATE 120 MG: 60 TABLET, EXTENDED RELEASE ORAL at 10:26

## 2024-04-26 RX ADMIN — HYDROCODONE BITARTRATE AND ACETAMINOPHEN 1 TABLET: 5; 325 TABLET ORAL at 22:59

## 2024-04-26 RX ADMIN — GABAPENTIN 300 MG: 600 TABLET, FILM COATED ORAL at 20:16

## 2024-04-26 RX ADMIN — HYDRALAZINE HYDROCHLORIDE 100 MG: 50 TABLET ORAL at 10:26

## 2024-04-26 RX ADMIN — AMIODARONE HYDROCHLORIDE 200 MG: 200 TABLET ORAL at 10:26

## 2024-04-26 RX ADMIN — DICYCLOMINE HYDROCHLORIDE 10 MG: 10 CAPSULE ORAL at 13:19

## 2024-04-26 RX ADMIN — METOPROLOL SUCCINATE 100 MG: 100 TABLET, EXTENDED RELEASE ORAL at 10:29

## 2024-04-26 RX ADMIN — MEXILETINE HYDROCHLORIDE 300 MG: 150 CAPSULE ORAL at 06:04

## 2024-04-26 RX ADMIN — GABAPENTIN 300 MG: 600 TABLET, FILM COATED ORAL at 10:28

## 2024-04-26 RX ADMIN — HYDRALAZINE HYDROCHLORIDE 100 MG: 50 TABLET ORAL at 13:20

## 2024-04-26 RX ADMIN — SODIUM CHLORIDE, PRESERVATIVE FREE 10 ML: 5 INJECTION INTRAVENOUS at 20:01

## 2024-04-26 RX ADMIN — ATORVASTATIN CALCIUM 40 MG: 40 TABLET, FILM COATED ORAL at 20:00

## 2024-04-26 RX ADMIN — TICAGRELOR 90 MG: 90 TABLET ORAL at 10:41

## 2024-04-26 RX ADMIN — TICAGRELOR 90 MG: 90 TABLET ORAL at 20:16

## 2024-04-26 ASSESSMENT — PAIN DESCRIPTION - ORIENTATION
ORIENTATION_2: RIGHT;LEFT
ORIENTATION: LEFT;RIGHT;LOWER
ORIENTATION: LOWER
ORIENTATION: RIGHT;LEFT;LOWER
ORIENTATION_2: RIGHT;LEFT

## 2024-04-26 ASSESSMENT — PAIN DESCRIPTION - DESCRIPTORS
DESCRIPTORS: ACHING;DISCOMFORT
DESCRIPTORS_2: ACHING
DESCRIPTORS: ACHING;DISCOMFORT
DESCRIPTORS: CRAMPING
DESCRIPTORS_2: ACHING

## 2024-04-26 ASSESSMENT — PAIN DESCRIPTION - PAIN TYPE
TYPE: CHRONIC PAIN
TYPE: CHRONIC PAIN

## 2024-04-26 ASSESSMENT — PAIN - FUNCTIONAL ASSESSMENT
PAIN_FUNCTIONAL_ASSESSMENT: PREVENTS OR INTERFERES SOME ACTIVE ACTIVITIES AND ADLS
PAIN_FUNCTIONAL_ASSESSMENT: ACTIVITIES ARE NOT PREVENTED
PAIN_FUNCTIONAL_ASSESSMENT_SITE2: ACTIVITIES ARE NOT PREVENTED
PAIN_FUNCTIONAL_ASSESSMENT_SITE2: ACTIVITIES ARE NOT PREVENTED
PAIN_FUNCTIONAL_ASSESSMENT: PREVENTS OR INTERFERES SOME ACTIVE ACTIVITIES AND ADLS

## 2024-04-26 ASSESSMENT — PAIN DESCRIPTION - INTENSITY
RATING_2: 9
RATING_2: 9

## 2024-04-26 ASSESSMENT — PAIN SCALES - GENERAL
PAINLEVEL_OUTOF10: 8
PAINLEVEL_OUTOF10: 7
PAINLEVEL_OUTOF10: 8
PAINLEVEL_OUTOF10: 2

## 2024-04-26 ASSESSMENT — PAIN DESCRIPTION - LOCATION
LOCATION_2: LEG
LOCATION: HIP;LEG
LOCATION: ABDOMEN
LOCATION_2: LEG
LOCATION: LEG;HIP
LOCATION: HIP;LEG

## 2024-04-26 ASSESSMENT — PAIN DESCRIPTION - FREQUENCY
FREQUENCY: CONTINUOUS
FREQUENCY: INTERMITTENT

## 2024-04-26 ASSESSMENT — PAIN DESCRIPTION - ONSET
ONSET: ON-GOING
ONSET: ON-GOING

## 2024-04-26 ASSESSMENT — PAIN SCALES - WONG BAKER: WONGBAKER_NUMERICALRESPONSE: NO HURT

## 2024-04-26 NOTE — H&P
Hospitalist History & Physical    Patient:  Garrett Duncan    Unit/Bed:8B-30/030-A  YOB: 1965  MRN: [id number]   Acct: [id number]   PCP: Leonel Adorno APRN - CNP  Code Status: Full Code    Date of Service: The patient was seen and examined on 04/25/24 and admitted to Observation with an expected length of stay of less than two midnights due to medical therapy.     Chief Complaint: Shortness of breath, leg pain, abdominal pain    Assessment/Plan:    HFrEF in acute exacerbation  Echo on 3/8/2024 with EF of 30 to 35% with severe global hypokinesis.  Chest x-ray with evidence of volume overload.  Patient reports worsening orthopnea as well as shortness of breath.  Outpatient diuretic regimen includes 2 mg of Bumex daily.  Additional 2 mg dose of IV Bumex tonight.  Continue oral Bumex starting tomorrow.  Strict ins and outs. Daily weights. Low sodium diet. Fluid restriction.  Bilateral leg pain  X 2 years.  Patient reports decreased exercise tolerance secondary to bilateral leg pain which improves with leaning forward.  Symptoms suggestive of spinal stenosis.  Patient underwent MRI of the thoracic and lumbar spines in 2023 which revealed degenerative changes.  Bilateral hip x-rays with mild bilateral superior joint narrowing.  PT evaluation.  Referral to spine surgery upon discharge.  CKD stage IV  Creatinine 3.7, around baseline.  Daily BMP.  Close monitoring of renal function while diuresing.  Iron deficiency anemia  Hemoglobin 7.8.  Iron studies on 4/5/2024 consistent with iron deficiency.  Increase iron supplementation to daily.  Coronary artery disease  Status post CABG.  Continue Brilinta.  Insulin-dependent type 2 diabetes mellitus  Continue home regimen.  Paroxysmal atrial fibrillation with secondary hypercoagulable state  On Coumadin.  Pharmacy to dose.  Continue mexiletine, amiodarone.  Hypertension  Continue home regimen.  Hyperlipidemia  Continue Lipitor.  History of alcohol

## 2024-04-26 NOTE — ED NOTES
Pt is resting in bed, pt states he wants something for his bilateral leg and hip pain. Pt is breathing regular and unlabored on room air. Pt call light within reach.

## 2024-04-26 NOTE — PLAN OF CARE
Problem: Discharge Planning  Goal: Discharge to home or other facility with appropriate resources  Outcome: Progressing  Flowsheets (Taken 4/25/2024 2030)  Discharge to home or other facility with appropriate resources:   Identify barriers to discharge with patient and caregiver   Arrange for needed discharge resources and transportation as appropriate   Identify discharge learning needs (meds, wound care, etc)     Problem: Pain  Goal: Verbalizes/displays adequate comfort level or baseline comfort level  Outcome: Progressing  Flowsheets (Taken 4/25/2024 2030)  Verbalizes/displays adequate comfort level or baseline comfort level:   Encourage patient to monitor pain and request assistance   Assess pain using appropriate pain scale   Administer analgesics based on type and severity of pain and evaluate response     Problem: ABCDS Injury Assessment  Goal: Absence of physical injury  Outcome: Progressing     Problem: Safety - Adult  Goal: Free from fall injury  Outcome: Progressing

## 2024-04-26 NOTE — CARE COORDINATION
Case Management Assessment Initial Evaluation    Date/Time of Evaluation: 2024 4:06 PM  Assessment Completed by: Lorna Smith RN    If patient is discharged prior to next notation, then this note serves as note for discharge by case management.    Patient Name: Garrett Duncan                   YOB: 1965  Diagnosis: Acute on chronic systolic (congestive) heart failure (HCC) [I50.23]                   Date / Time: 2024  3:24 PM  Location: [address] [place] [address]     Patient Admission Status: Observation   Readmission Risk Low 0-14, Mod 15-19), High > 20: Readmission Risk Score: 37    Current PCP: Leonel Adorno, APRN - CNP    Additional Case Management Notes: Admitted with Abd pain and SOB. Hx CHF. Creat noted to be 3.7. Pro BNP 61492. PO Bumex.    Procedures: No    Imagin24 CXR    Patient Goals/Plan/Treatment Preferences: Met with Garrett this afternoon. Garrett is very drowsy and this CM had to cont to awaken him during interview. He resides alone and has children in area that are supportive. Follow for needs.         IMPRESSION:  1. Moderate cardiomegaly with pulmonary vascular congestion suspicious for congestive heart failure.  2. Prominent pulmonary interstitium suspicious for pulmonary edema.

## 2024-04-27 VITALS
SYSTOLIC BLOOD PRESSURE: 131 MMHG | OXYGEN SATURATION: 95 % | RESPIRATION RATE: 16 BRPM | HEART RATE: 60 BPM | BODY MASS INDEX: 33.16 KG/M2 | TEMPERATURE: 97.9 F | WEIGHT: 258.4 LBS | DIASTOLIC BLOOD PRESSURE: 65 MMHG | HEIGHT: 74 IN

## 2024-04-27 LAB
ANION GAP SERPL CALC-SCNC: 13 MEQ/L (ref 8–16)
BUN SERPL-MCNC: 49 MG/DL (ref 7–22)
CALCIUM SERPL-MCNC: 8.7 MG/DL (ref 8.5–10.5)
CHLORIDE SERPL-SCNC: 104 MEQ/L (ref 98–111)
CO2 SERPL-SCNC: 22 MEQ/L (ref 23–33)
CREAT SERPL-MCNC: 4.2 MG/DL (ref 0.4–1.2)
GFR SERPL CREATININE-BSD FRML MDRD: 15 ML/MIN/1.73M2
GLUCOSE BLD STRIP.AUTO-MCNC: 215 MG/DL (ref 70–108)
GLUCOSE BLD STRIP.AUTO-MCNC: 262 MG/DL (ref 70–108)
GLUCOSE SERPL-MCNC: 168 MG/DL (ref 70–108)
INR PPP: 2.16 (ref 0.85–1.13)
POTASSIUM SERPL-SCNC: 4.7 MEQ/L (ref 3.5–5.2)
SODIUM SERPL-SCNC: 139 MEQ/L (ref 135–145)

## 2024-04-27 PROCEDURE — 6370000000 HC RX 637 (ALT 250 FOR IP): Performed by: PHYSICIAN ASSISTANT

## 2024-04-27 PROCEDURE — 99239 HOSP IP/OBS DSCHRG MGMT >30: CPT | Performed by: HOSPITALIST

## 2024-04-27 PROCEDURE — 6370000000 HC RX 637 (ALT 250 FOR IP): Performed by: PHARMACIST

## 2024-04-27 PROCEDURE — 36415 COLL VENOUS BLD VENIPUNCTURE: CPT

## 2024-04-27 PROCEDURE — G0378 HOSPITAL OBSERVATION PER HR: HCPCS

## 2024-04-27 PROCEDURE — 82948 REAGENT STRIP/BLOOD GLUCOSE: CPT

## 2024-04-27 PROCEDURE — 6370000000 HC RX 637 (ALT 250 FOR IP)

## 2024-04-27 PROCEDURE — 80048 BASIC METABOLIC PNL TOTAL CA: CPT

## 2024-04-27 PROCEDURE — 85610 PROTHROMBIN TIME: CPT

## 2024-04-27 RX ORDER — HYDROCODONE BITARTRATE AND ACETAMINOPHEN 5; 325 MG/1; MG/1
1 TABLET ORAL EVERY 8 HOURS PRN
Qty: 6 TABLET | Refills: 0 | Status: SHIPPED | OUTPATIENT
Start: 2024-04-27 | End: 2024-04-29

## 2024-04-27 RX ORDER — BUMETANIDE 2 MG/1
2 TABLET ORAL DAILY
Qty: 90 TABLET | Refills: 3 | Status: SHIPPED | OUTPATIENT
Start: 2024-04-29

## 2024-04-27 RX ORDER — WARFARIN SODIUM 5 MG/1
5 TABLET ORAL ONCE
Status: COMPLETED | OUTPATIENT
Start: 2024-04-27 | End: 2024-04-27

## 2024-04-27 RX ORDER — HYDROCODONE BITARTRATE AND ACETAMINOPHEN 5; 325 MG/1; MG/1
1 TABLET ORAL EVERY 8 HOURS PRN
Qty: 6 TABLET | Refills: 0 | Status: SHIPPED | OUTPATIENT
Start: 2024-04-27 | End: 2024-04-27

## 2024-04-27 RX ADMIN — INSULIN GLARGINE 10 UNITS: 100 INJECTION, SOLUTION SUBCUTANEOUS at 08:52

## 2024-04-27 RX ADMIN — AMIODARONE HYDROCHLORIDE 200 MG: 200 TABLET ORAL at 08:51

## 2024-04-27 RX ADMIN — TICAGRELOR 90 MG: 90 TABLET ORAL at 08:51

## 2024-04-27 RX ADMIN — MEXILETINE HYDROCHLORIDE 300 MG: 150 CAPSULE ORAL at 13:09

## 2024-04-27 RX ADMIN — DICYCLOMINE HYDROCHLORIDE 10 MG: 10 CAPSULE ORAL at 12:20

## 2024-04-27 RX ADMIN — EMPAGLIFLOZIN 10 MG: 10 TABLET, FILM COATED ORAL at 08:51

## 2024-04-27 RX ADMIN — INSULIN LISPRO 1 UNITS: 100 INJECTION, SOLUTION INTRAVENOUS; SUBCUTANEOUS at 08:52

## 2024-04-27 RX ADMIN — GABAPENTIN 300 MG: 600 TABLET, FILM COATED ORAL at 08:52

## 2024-04-27 RX ADMIN — HYDRALAZINE HYDROCHLORIDE 100 MG: 50 TABLET ORAL at 13:09

## 2024-04-27 RX ADMIN — HYDRALAZINE HYDROCHLORIDE 100 MG: 50 TABLET ORAL at 08:51

## 2024-04-27 RX ADMIN — ISOSORBIDE MONONITRATE 120 MG: 60 TABLET, EXTENDED RELEASE ORAL at 08:51

## 2024-04-27 RX ADMIN — MEXILETINE HYDROCHLORIDE 300 MG: 150 CAPSULE ORAL at 07:02

## 2024-04-27 RX ADMIN — WARFARIN SODIUM 5 MG: 5 TABLET ORAL at 14:48

## 2024-04-27 RX ADMIN — Medication 100 MG: at 08:51

## 2024-04-27 RX ADMIN — METOPROLOL SUCCINATE 100 MG: 100 TABLET, EXTENDED RELEASE ORAL at 08:51

## 2024-04-27 RX ADMIN — INSULIN LISPRO 2 UNITS: 100 INJECTION, SOLUTION INTRAVENOUS; SUBCUTANEOUS at 13:10

## 2024-04-27 RX ADMIN — DICYCLOMINE HYDROCHLORIDE 10 MG: 10 CAPSULE ORAL at 07:02

## 2024-04-27 ASSESSMENT — PAIN DESCRIPTION - ORIENTATION: ORIENTATION: RIGHT;LEFT

## 2024-04-27 ASSESSMENT — PAIN DESCRIPTION - DESCRIPTORS: DESCRIPTORS: DISCOMFORT

## 2024-04-27 ASSESSMENT — PAIN DESCRIPTION - LOCATION: LOCATION: LEG

## 2024-04-27 ASSESSMENT — PAIN - FUNCTIONAL ASSESSMENT: PAIN_FUNCTIONAL_ASSESSMENT: ACTIVITIES ARE NOT PREVENTED

## 2024-04-27 ASSESSMENT — PAIN SCALES - GENERAL: PAINLEVEL_OUTOF10: 5

## 2024-04-27 NOTE — DISCHARGE SUMMARY
for this even after being instructed to do so.  Says he is ready at this time and was tearful when discussing how bad his pain was.  Was given some medication to control pain while inpatient.  Repeat BMP on day of discharge showed elevated creatinine from baseline, was instructed to hold Bumex on discharge for 2 days and continue on Monday, 4/29/2024 where he also get a BMP at that time as well.  Instructed to follow-up with PCP.  Referral sent to [facility] for spinal surgeon, Dr. Waller.  No other changes in medication.  All questions answered.      The patient was seen and examined on day of discharge and this discharge summary is in conjunction with any daily progress note from day of discharge.    The patient is discharged in stable condition.      Exam:   Vitals:  Vitals:    04/26/24 1958 04/27/24 0318 04/27/24 0845 04/27/24 1243   BP: 133/72 133/77 (!) 155/93 131/65   Pulse: 52 52 60 60   Resp: 14 16 15 16   Temp: 98.6 °F (37 °C) 97.7 °F (36.5 °C) 98 °F (36.7 °C) 97.9 °F (36.6 °C)   TempSrc: Oral Oral Oral Oral   SpO2: 100% 92% 93% 95%   Weight:  117.2 kg (258 lb 6.4 oz)     Height:         Weight: Weight - Scale: 117.2 kg (258 lb 6.4 oz)     General appearance: No apparent distress, well developed, appears stated age.  Appears mildly uncomfortable, leaning forward in bed.  Eyes:  Pupils equal, round, and reactive to light. Conjunctivae/corneas clear.  HENT: Head normal in appearance. External nares normal.  Oral mucosa moist without lesions.  Hearing grossly intact.   Neck: Supple, with full range of motion. Trachea midline.  No gross JVD appreciated.  Respiratory:  Normal respiratory effort. Clear to auscultation, bilaterally without rales or wheezes or rhonchi.  Cardiovascular: Normal rate, regular rhythm with normal S1/S2 without murmurs.  No lower extremity edema.   Abdomen: Soft, non-tender, non-distended with normal bowel sounds.  Musculoskeletal: There is no joint swelling or tenderness. Normal tone. No

## 2024-04-27 NOTE — DISCHARGE INSTRUCTIONS
-Hold bumex for the next two days.   -Repeat labwork (BMP) in two days (on Monday)  -Can restart bumex at that time  -Have results sent to your primary care doctor Leonel Adorno.   -Be sure to drink plenty of water to stay hydrated.   -Sent referral to spine surgery at [facility].

## 2024-04-27 NOTE — PROGRESS NOTES
Pharmacy Medication History Note      List of current medications patient is taking is complete.    Source of information: Patient, recent fill history    Changes made to medication list:  Medications removed (include reason, ex. therapy complete or physician discontinued):  Ferrous sulfate  multivitamin    Medications added/doses adjusted:  Changed Bentyl 10 mg from ACHS to TIDAC  Changed hydralazine from 100 mg to 50 mg TID    Other notes (ex. Recent course of antibiotics, Coumadin dosing):  Patient suppose to take hydralazine 100 mg at home but pharmacy filled the wrong strength with the last fill per patient.  Denies use of other OTC or herbal medications.      Allergies reviewed      Electronically signed by Queta Almanzar RPH on 4/25/2024 at 7:53 PM                                                    
Pt. Discharged home. Discharge education provided to the patient. Discharge education reviewed and all questions answered.   
Warfarin Pharmacy Consult Note    Garrett Duncan is a 58 y.o. male for whom pharmacy has been asked to manage warfarin therapy.     Consulting Provider: HUGH Hunt  Warfarin Indication: Atrial fibrillation or Atrial flutter  Target INR range: 2.0-3.0   Warfarin dose prior to admission: 5 mg daily except 2.5 mg on Friday  Outpatient warfarin provider: [facility]    Recent Labs     04/25/24  1613   INR 3.10*     Recent Labs     04/25/24  1548   HGB 7.8*        Concurrent anticoagulants/antiplatelets: ticagrelor  Significant warfarin drug-drug interactions: amiodarone    Date INR Warfarin Dose   4/25/24 3.1 2.5 mg                                   INR will be monitored routinely until therapeutic INR is achieved.    Pharmacy will continue to follow. Thank you for the consult,   Queta Almanzar, PharmD  4/25/2024 8:14 PM     
Warfarin Pharmacy Consult Note    Warfarin Indication: Atrial fibrillation or Atrial flutter  Target INR: 2.0-3.0  Dose prior to admission: 2.5mg F, 5mg all other days    Recent Labs     04/25/24  1613 04/26/24  0603   INR 3.10* 3.07*     Recent Labs     04/25/24  1548 04/26/24  0603   HGB 7.8* 7.8*    184     Concurrent anticoagulants/antiplatelets: ticagrelor  Significant warfarin drug-drug interactions: amiodarone     Date INR Warfarin Dose   4/25/24 3.1 2.5 mg   4/26/24   3.07 2.5 mg                                                Monitoring:                   INR will be monitored daily until therapeutic INR is achieved.    **Please contact pharmacy for discharge instructions when indicated**    Stephenie ROSAS.Ph., BCPS., 4/26/2024,7:54 AM      
Warfarin Pharmacy Consult Note    Warfarin Indication: Atrial fibrillation or Atrial flutter  Target INR: 2.0-3.0  Dose prior to admission: 5mg daily except 2.5mg Fridays    Recent Labs     04/25/24  1613 04/26/24  0603 04/27/24  0620   INR 3.10* 3.07* 2.16*     Recent Labs     04/25/24  1548 04/26/24  0603   HGB 7.8* 7.8*    184     Concurrent anticoagulants/antiplatelets: ticagrelor  Significant warfarin drug-drug interactions: amiodarone     Date INR Warfarin Dose   4/25/24 3.1 2.5 mg   4/26/24   3.07 2.5 mg    4/27/2024  2.16  5 mg                                        Monitoring:                   INR will be monitored daily until therapeutic INR is achieved.    **Please contact pharmacy for discharge instructions when indicated**    ANTIONE MACARIO [facility]  4/27/2024  1:43 PM      
reflect edema or infiltrate. Trace right and tiny left pleural effusions. This document has been electronically signed by: Santos Tobin MD on 04/25/2024 06:13 PM All CTs at this facility use dose modulation techniques and iterative reconstructions, and/or weight-based dosing when appropriate to reduce radiation to a low as reasonably achievable.    XR CHEST PORTABLE    Result Date: 4/25/2024  PROCEDURE: XR CHEST PORTABLE CLINICAL INFORMATION: Shortness of breath TECHNIQUE: Mobile AP chest radiograph. COMPARISON: PA and lateral chest radiographs 4/4/2024 FINDINGS: A left-sided cardiac device is in stable position. Median sternotomy wires are again noted. There is moderate stable enlargement of the cardiac silhouette. Pulmonary vessels are mildly congested. Pulmonary interstitium is prominent. Degenerative changes in the thoracic spine are poorly visualized.     1. Moderate cardiomegaly with pulmonary vascular congestion suspicious for congestive heart failure. 2. Prominent pulmonary interstitium suspicious for pulmonary edema. **This report has been created using voice recognition software. It may contain minor errors which are inherent in voice recognition technology.** Final report electronically signed by Dr. Garo Rueda on 4/25/2024 3:48 PM      Electronically Signed by  Martin Joy DO, MBA  PGY-1 Internal Medicine Resident  [facility]  On 4/26/2024 at 11:23 AM    This report has been created using voice recognition software. It may contain minor errors which are inherent in voice recognition technology

## 2024-04-27 NOTE — FLOWSHEET NOTE
04/26/24 2223   Treatment Team Notification   Reason for Communication Patient/Family request   Name of Team Member Notified Rita Keitah   Treatment Team Role Resident   Method of Communication Secure Message   Response Waiting for response   Notification Time 2223     8B30 Patient admitted on the 25th for CHF. Patient is complaining of chronic bilateral lower leg pain. He states he needs to have back surgery. Patient states pain is 9/10 and is requesting morphine for it. He received gabapentin 300mg at 2016 and states it did not help. Patient does seem lethargic and is falling asleep while having a conversation. he only takes tylenol for the pain at home.

## 2024-04-29 ENCOUNTER — CARE COORDINATION (OUTPATIENT)
Dept: CASE MANAGEMENT | Age: 59
End: 2024-04-29

## 2024-04-29 ENCOUNTER — NURSE ONLY (OUTPATIENT)
Dept: LAB | Age: 59
End: 2024-04-29

## 2024-04-29 ENCOUNTER — TELEPHONE (OUTPATIENT)
Dept: FAMILY MEDICINE CLINIC | Age: 59
End: 2024-04-29

## 2024-04-29 ENCOUNTER — OFFICE VISIT (OUTPATIENT)
Dept: FAMILY MEDICINE CLINIC | Age: 59
End: 2024-04-29

## 2024-04-29 VITALS
WEIGHT: 258.4 LBS | DIASTOLIC BLOOD PRESSURE: 70 MMHG | BODY MASS INDEX: 33.16 KG/M2 | SYSTOLIC BLOOD PRESSURE: 124 MMHG | RESPIRATION RATE: 16 BRPM | HEIGHT: 74 IN | HEART RATE: 60 BPM

## 2024-04-29 DIAGNOSIS — I25.709 CORONARY ARTERY DISEASE INVOLVING CORONARY BYPASS GRAFT OF NATIVE HEART WITH ANGINA PECTORIS (HCC): ICD-10-CM

## 2024-04-29 DIAGNOSIS — I50.43 ACUTE ON CHRONIC COMBINED SYSTOLIC AND DIASTOLIC CONGESTIVE HEART FAILURE (HCC): ICD-10-CM

## 2024-04-29 DIAGNOSIS — Z95.810 ICD (IMPLANTABLE CARDIOVERTER-DEFIBRILLATOR) IN PLACE: ICD-10-CM

## 2024-04-29 DIAGNOSIS — N18.4 CKD (CHRONIC KIDNEY DISEASE), STAGE IV (HCC): ICD-10-CM

## 2024-04-29 DIAGNOSIS — I42.0 DILATED CARDIOMYOPATHY (HCC): ICD-10-CM

## 2024-04-29 DIAGNOSIS — M48.061 FORAMINAL STENOSIS OF LUMBAR REGION: ICD-10-CM

## 2024-04-29 DIAGNOSIS — Z09 HOSPITAL DISCHARGE FOLLOW-UP: Primary | ICD-10-CM

## 2024-04-29 DIAGNOSIS — E11.22 CONTROLLED TYPE 2 DIABETES MELLITUS WITH CHRONIC KIDNEY DISEASE, WITHOUT LONG-TERM CURRENT USE OF INSULIN, UNSPECIFIED CKD STAGE (HCC): ICD-10-CM

## 2024-04-29 DIAGNOSIS — N18.4 CKD (CHRONIC KIDNEY DISEASE) STAGE 4, GFR 15-29 ML/MIN (HCC): ICD-10-CM

## 2024-04-29 DIAGNOSIS — I50.22 CHRONIC SYSTOLIC CONGESTIVE HEART FAILURE (HCC): ICD-10-CM

## 2024-04-29 DIAGNOSIS — I10 ESSENTIAL HYPERTENSION: ICD-10-CM

## 2024-04-29 DIAGNOSIS — G89.29 CHRONIC BILATERAL LOW BACK PAIN, UNSPECIFIED WHETHER SCIATICA PRESENT: ICD-10-CM

## 2024-04-29 DIAGNOSIS — M54.50 CHRONIC BILATERAL LOW BACK PAIN, UNSPECIFIED WHETHER SCIATICA PRESENT: ICD-10-CM

## 2024-04-29 LAB
ANION GAP SERPL CALC-SCNC: 13 MEQ/L (ref 8–16)
BUN SERPL-MCNC: 48 MG/DL (ref 7–22)
CALCIUM SERPL-MCNC: 8.7 MG/DL (ref 8.5–10.5)
CHLORIDE SERPL-SCNC: 105 MEQ/L (ref 98–111)
CO2 SERPL-SCNC: 22 MEQ/L (ref 23–33)
CREAT SERPL-MCNC: 3.8 MG/DL (ref 0.4–1.2)
GFR SERPL CREATININE-BSD FRML MDRD: 17 ML/MIN/1.73M2
GLUCOSE SERPL-MCNC: 220 MG/DL (ref 70–108)
POTASSIUM SERPL-SCNC: 4.4 MEQ/L (ref 3.5–5.2)
SODIUM SERPL-SCNC: 140 MEQ/L (ref 135–145)

## 2024-04-29 RX ORDER — HYDROCODONE BITARTRATE AND ACETAMINOPHEN 5; 325 MG/1; MG/1
1 TABLET ORAL EVERY 8 HOURS PRN
Qty: 9 TABLET | Refills: 0 | Status: SHIPPED | OUTPATIENT
Start: 2024-04-29 | End: 2024-05-02

## 2024-04-29 NOTE — PROGRESS NOTES
Post-Discharge Transitional Care Follow Up      Garrett Duncan   YOB: 1965    Date of Office Visit:  4/29/2024  Date of Hospital Admission: 4/25/24  Date of Hospital Discharge: 4/27/24  Readmission Risk Score (high >=14%. Medium >=10%):Readmission Risk Score: 37      Care management risk score Rising risk (score 2-5) and Complex Care (Scores >=6): No Risk Score On File     Non face to face  following discharge, date last encounter closed (first attempt may have been earlier): 04/29/2024     Call initiated 2 business days of discharge: Yes     Hospital discharge follow-up  -     CT DISCHARGE MEDS RECONCILED W/ CURRENT OUTPATIENT MED LIST  Acute on chronic combined systolic and diastolic congestive heart failure (HCC)  Chronic systolic congestive heart failure (HCC)  Dilated cardiomyopathy (HCC)  Coronary artery disease involving coronary bypass graft of native heart with angina pectoris (HCC)  ICD (implantable cardioverter-defibrillator) in place  CKD (chronic kidney disease) stage 4, GFR 15-29 ml/min (HCC)  Essential hypertension  Controlled type 2 diabetes mellitus with chronic kidney disease, without long-term current use of insulin, unspecified CKD stage (HCC)  Foraminal stenosis of lumbar region  Chronic bilateral low back pain, unspecified whether sciatica present  -     HYDROcodone-acetaminophen (NORCO) 5-325 MG per tablet; Take 1 tablet by mouth every 8 hours as needed for Pain for up to 3 days. Intended supply: 3 days. Take lowest dose possible to manage pain Max Daily Amount: 3 tablets, Disp-9 tablet, R-0Normal      MDM:  Labs reviewed from today.  Kidney function back at baseline.  Wearing compression stockings.  Restart Bumex per Hospital instructions.  Sees CARDIO in 2 weeks.     Walking 200 yards and will have to stop due to pain in legs to weakness.  Sees ORTHO specialists in [facility] on 5/2/24.  MRI Lumbar in Fall 2023 showed foraminal stenosis.   [place] #9    RTW 5/6/24  Follow up with

## 2024-04-29 NOTE — PATIENT INSTRUCTIONS
You may receive a survey about your visit with us today. The feedback from our patients helps us identify what is working well and where the service to all patients can be enhanced. Thank you!     Tobacco Cessation Programs     Telephonic behavior modification  1-800-QUIT-NOW (427-2866)  Counseling service for those who are ready to quit using tobacco.    Available for uninsured [place] residents, Medicaid recipients, pregnant women, or patients whose health plans or employers are members of the [place] [employer]    Online behavior modification  http://[place].Quitlogix.org  Online support program to help patients through each step of the quitting process.  Available 24 hours a day 7 days a week.  Provides up to date researched based tool, step-by-step guides, and motivational messages.    Online behavior modification  www.lungusa.org/stop-smoking/how-to-quit  HelpLine: 1-800-LUNG-USA (601-9342)  Email questions to:  questions@[facility].org   Website offers resources to help tobacco users figure out their reasons for quitting and then take the big step of quitting for good.    Hypnosis  Location: [address] [place] [address]  Contact: Robert Marie, PhD at [phone]  Hypnosis for tobacco cessation  Cost $225 for the initial session and $175 for each session afterwards.  Most patients require 6-8 sessions.  There is the option to submit through the patient’s insurance.    Hypnosis and behavior modification  Location: [address] [place] [address]  Contact: Romie Ayala, PhD at [phone]  Counseling and hypnosis for nicotine addition  Cost: For uninsured patients:  Please call above phone number  Cost for insured patients depends on patient’s insurance plan.    Behavior modification  Location: [facility], [address] [place] [address]  Contact: [phone]  Services include four one-on-one appointments between the patient and a respiratory therapist.  The four appointments

## 2024-04-29 NOTE — TELEPHONE ENCOUNTER
Care Transitions Initial Follow Up Call    Outreach made within 2 business days of discharge: Yes    Patient: Garrett Duncan Patient : 1965   MRN: [id number]  Reason for Admission: There are no discharge diagnoses documented for the most recent discharge.  Discharge Date: 24       Spoke with: Pt    Discharge department/facility: [facility]    TCM Interactive Patient Contact:  Was patient able to fill all prescriptions: Yes  Was patient instructed to bring all medications to the follow-up visit: Yes  Is patient taking all medications as directed in the discharge summary? Yes  Does patient understand their discharge instructions: Yes  Does patient have questions or concerns that need addressed prior to 7-14 day follow up office visit: no    Scheduled appointment with PCP within 7-14 days    Follow Up  Future Appointments   Date Time Provider Department Center   2024  3:30 PM Mortimer, Connor, PA-C [facility] [place]   2024  3:00 PM Samuel Kathleen MD [facility] [place]   2024  3:30 PM [facility] PULMONARY FUNCTION ROOM 1 [facility] [place] [facility]   2024  3:30 PM Leonel Adorno, APRN - CNP [facility] [place]   2024  3:30 PM Shelly Johnson MD [facility] [place]   2025  1:45 PM Fany Guzmán MD [facility] [place]       Marybeth Lema CMA ([facility])

## 2024-04-30 ENCOUNTER — CARE COORDINATION (OUTPATIENT)
Dept: CASE MANAGEMENT | Age: 59
End: 2024-04-30

## 2024-04-30 ASSESSMENT — ENCOUNTER SYMPTOMS
SHORTNESS OF BREATH: 0
BACK PAIN: 1
NAUSEA: 0
ABDOMINAL PAIN: 0
COUGH: 0

## 2024-04-30 NOTE — CARE COORDINATION
Care Transitions Note    Initial Call - Call within 2 business days of discharge: Yes     Attempted to reach patient for transitions of care follow up. Unable to reach patient.    Outreach Attempts:   Unable to leave message.     Patient: Garrett Duncan    Patient : 1965   MRN: [id number]    Reason for Admission: Abdominal pain, SOB, Acute on chronic CHF  Discharge Date: 24  RURS: Readmission Risk              Risk of Unplanned Readmission:  0          Last Discharge Facility       Date Complaint Diagnosis Description Type Department Provider    24 Abdominal Pain; Shortness of Breath Acute on chronic congestive heart failure, unspecified heart failure type (HCC) ... ED to Hosp-Admission (Discharged) (ADMITTED) Jozef Rosales MD; Radha,...            Was this an external facility discharge? No    Follow Up Appointment:   Patient has hospital follow up appointment scheduled within 7 days of discharge.    Future Appointments         Provider Specialty Dept Phone    2024 3:30 PM Mortimer, Connor, PA-C Cardiology [phone]    2024 3:00 PM Samuel Kathleen MD Nephrology [phone]    2024 3:30 PM [facility] PULMONARY FUNCTION ROOM 1 Pulmonary Function Testing [phone]    2024 3:30 PM Leonel Adorno, APRN - CNP Family Medicine [phone]    2024 3:30 PM Shelly Johnson MD Cardiology [phone]    2025 1:45 PM Fany Guzmán MD Cardiology [phone]          1st attempt to contact pt for initial care transition follow up.  Unable to reach pt.  Unable to leave a message d/t voice mailbox being full.  Will try again at a later time.      Plan for follow-up on next business day.      Jennifer Cevallos RN

## 2024-05-01 ENCOUNTER — CARE COORDINATION (OUTPATIENT)
Dept: CASE MANAGEMENT | Age: 59
End: 2024-05-01

## 2024-05-01 NOTE — CARE COORDINATION
the Remote Patient Monitoring (RPM) program for in-home monitoring: Patient is not eligible for RPM program because: insurance coverage.    Assessments:  Care Transitions 24 Hour Call    Do you have a copy of your discharge instructions?: Yes  Do you have all of your prescriptions and are they filled?: Yes  Have you been contacted by a [facility] Pharmacist?: No  Have you scheduled your follow up appointment?: Yes  Do you feel like you have everything you need to keep you well at home?: Yes  Care Transitions Interventions    Pharmacist: Declined       Transportation Support: Declined     Registered Dietician: Declined    Diabetes Education: Declined      Disease Specific Clinic: Declined      Disease Association: Completed                Follow Up Appointment:   Discussed follow up appointments. Patient has hospital follow up appointment scheduled within 7 days of discharge.   Future Appointments         Provider Specialty Dept Phone    5/16/2024 3:30 PM Mortimer, Connor, PA-C Cardiology [phone]    7/29/2024 3:00 PM Samuel Kathleen MD Nephrology [phone]    8/12/2024 3:30 PM [facility] PULMONARY FUNCTION ROOM 1 Pulmonary Function Testing [phone]    8/20/2024 3:30 PM Leonel Adorno, APRN - CNP Family Medicine [phone]    8/21/2024 3:30 PM Shelly Johnson MD Cardiology [phone]    2/28/2025 1:45 PM Fany Guzmán MD Cardiology [phone]            Care Transition Nurse provided contact information.  Plan for follow-up call in 6-10 days based on severity of symptoms and risk factors.  Plan for next call: symptom management-back pain/leg pain, CP, SOB, swelling, weight gain, any new or worsening symptoms, any new or changed meds, review [facility] appt      Jennifer Cevallos RN

## 2024-05-03 ENCOUNTER — TELEPHONE (OUTPATIENT)
Dept: FAMILY MEDICINE CLINIC | Age: 59
End: 2024-05-03

## 2024-05-03 NOTE — TELEPHONE ENCOUNTER
Pt called office asking if you received the [facility] paperwork that was sent to the office earlier this week. Please advise.

## 2024-05-07 ENCOUNTER — TELEPHONE (OUTPATIENT)
Dept: CARDIOLOGY CLINIC | Age: 59
End: 2024-05-07

## 2024-05-07 ENCOUNTER — CARE COORDINATION (OUTPATIENT)
Dept: CASE MANAGEMENT | Age: 59
End: 2024-05-07

## 2024-05-07 NOTE — TELEPHONE ENCOUNTER
Missed download from device.   Monitor disconnected.   2nd attempt -- Called patient, LMOM and asked to send download.

## 2024-05-08 ENCOUNTER — CARE COORDINATION (OUTPATIENT)
Dept: CASE MANAGEMENT | Age: 59
End: 2024-05-08

## 2024-05-08 NOTE — CARE COORDINATION
Care Transitions Note    Follow Up Call      Attempted to reach patient for transitions of care follow up.  Unable to reach patient.      Outreach Attempts:   HIPAA compliant voicemail left for patient.     Care Summary Note: 2nd attempt to contact pt for care transition follow up.  Unable to reach pt.  Left message with contact information and request for call back.      Program will be closed and CTN to sign off if return call is not received from the patient.      Sent staff message to Kylah Ramesh for ACM referral.    Follow Up Appointment:   Future Appointments         Provider Specialty Dept Phone    5/14/2024 3:45 PM Anika Soriano, PT Physical Therapy [phone]    5/16/2024 3:30 PM Mortimer, Connor, PA-C Cardiology [phone]    7/29/2024 3:00 PM Samuel Kathleen MD Nephrology [phone]    8/12/2024 3:30 PM [facility] PULMONARY FUNCTION ROOM 1 Pulmonary Function Testing [phone]    8/20/2024 3:30 PM Leonel Adorno, APRN - CNP Family Medicine [phone]    8/21/2024 3:30 PM Shelly Johnson MD Cardiology [phone]    2/28/2025 1:45 PM Fany Guzmán MD Cardiology [phone]            No further follow-up call indicated     Jennifer Cevallos RN

## 2024-05-24 RX ORDER — ATORVASTATIN CALCIUM 40 MG/1
40 TABLET, FILM COATED ORAL NIGHTLY
Qty: 90 TABLET | Refills: 3 | Status: SHIPPED | OUTPATIENT
Start: 2024-05-24

## 2024-05-24 NOTE — TELEPHONE ENCOUNTER
Patient called and stated he needs a refill on atorvastatin 40 mg. He would like a 90 day supply sent to the pharmacy.     He will check with aramis after 12:00 pm today

## 2024-05-29 ENCOUNTER — TELEPHONE (OUTPATIENT)
Dept: FAMILY MEDICINE CLINIC | Age: 59
End: 2024-05-29

## 2024-05-29 NOTE — TELEPHONE ENCOUNTER
Agreed.  Since they started him on pain medications additional or more stronger need to come from them as treating providers.

## 2024-05-29 NOTE — TELEPHONE ENCOUNTER
Pt called office stating last time he was at [facility] 4/25/24 he was sent to [facility]. He was diagnosed with spinal stenosis. He is scheduled for a back fusion 7/2/2024. He starts PT tomorrow 5/30/24. Pt says they gave him Tramadol but it is not helping. The pain is getting worse in the lower left back and his legs are getting \"weaker\". Advised pt to call [facility] to let them know the pain is getting worse and legs are weaker and see what they recommend from now unitl surgery,other than PT. Pt voiced understanding and will contact [facility] regarding.

## 2024-05-30 ENCOUNTER — TELEPHONE (OUTPATIENT)
Dept: FAMILY MEDICINE CLINIC | Age: 59
End: 2024-05-30

## 2024-05-30 NOTE — TELEPHONE ENCOUNTER
Hannah with [facility] called office stating Leonel CROCKETT ok'd for pt to stop his Coumadin 5d prior through 5d after an upcoming procedure. They are going to bridge pt. For bridging the pt they need his wt, ht and creatinine level. Information was verbally given and then faxed to her at [phone]. She says with the most recent Creatinine being elevated, they may have a problem bridging. She would give this information to the pharmacist at [facility] and will call back if they have any further questions.

## 2024-05-30 NOTE — TELEPHONE ENCOUNTER
Marj with [facility] calling office after looking over the information I sent earlier. Pt's kidney function is not good and has a creatinine clearance of 34. For the Lovenox bridge with a creatinine above 30 they can do regular dosing of 1mg/kg every 12hrs. If creatinine is under 30 the dosing would be 1mg/kg once daily. With pt's level at 34, that number is close to the cut off and they want to confirm you want regular dosing of 1mg/kg BID still. The other option of treatment would be Heparin. Call her back at [phone]. Please advise.

## 2024-05-31 ENCOUNTER — TELEPHONE (OUTPATIENT)
Dept: CARDIOLOGY CLINIC | Age: 59
End: 2024-05-31

## 2024-06-05 ENCOUNTER — TELEPHONE (OUTPATIENT)
Dept: NEPHROLOGY | Age: 59
End: 2024-06-05

## 2024-06-05 ENCOUNTER — TELEPHONE (OUTPATIENT)
Dept: CARDIOLOGY CLINIC | Age: 59
End: 2024-06-05

## 2024-06-05 NOTE — TELEPHONE ENCOUNTER
Pt last seen by Dr. Dempsey 2-26-24.  Pt is needing clearance for L1-5 Decompression and Fusion with Dr. Rae on 7-2-24.  Pt is on Brilinta and Coumadin.    Fax [phone]  Pt would like notified.

## 2024-06-06 ENCOUNTER — TELEPHONE (OUTPATIENT)
Dept: CARDIOLOGY CLINIC | Age: 59
End: 2024-06-06

## 2024-06-06 ENCOUNTER — CARE COORDINATION (OUTPATIENT)
Dept: CARE COORDINATION | Age: 59
End: 2024-06-06

## 2024-06-06 RX ORDER — METOPROLOL SUCCINATE 100 MG/1
100 TABLET, EXTENDED RELEASE ORAL DAILY
Qty: 30 TABLET | Refills: 0 | Status: SHIPPED | OUTPATIENT
Start: 2024-06-06

## 2024-06-06 RX ORDER — METOPROLOL SUCCINATE 100 MG/1
100 TABLET, EXTENDED RELEASE ORAL DAILY
Qty: 90 TABLET | Refills: 0 | Status: CANCELLED | OUTPATIENT
Start: 2024-06-06

## 2024-06-06 NOTE — TELEPHONE ENCOUNTER
Spoke with pt today.  Pt reports that he ran out of metoprolol.  Pt thought he had refills but pharmacy informed him he did not.  They provided him with 5 tablets to get him through until new script sent. Pt requesting 90 day supply.  Medication pended.  Please review and advise.

## 2024-06-06 NOTE — CARE COORDINATION
Ambulatory Care Coordination Note  2024    Patient Current Location:  [place]    ACM contacted the patient by telephone. Verified name and  with patient as identifiers. Provided introduction to self, and explanation of the ACM role.     ACM: Kylah Ramesh RN    Challenges to be reviewed by the provider   Additional needs identified to be addressed with provider: No  none               Method of communication with provider: none.    Garrett was referred to care coordination for education and assistance in managing his chronic conditions.  Pt has h/o: HLD, HTN, a-fib, alcohol abuse, CHF, back pain.   Call short today as pt is at work.    Pt has been following with ortho-Dr. Rae for back pain.  Plan is for L1-5 decompression and fusion .  Pt reports that he  is waiting to hear back from cardiology re: clearance.  Taking Ultram for pain although he reports that it does not provide him with much relief.  Denies constipation issues. Has stool softeners at home that he uses PRN.   CHF: was hospitalized end of April for CHF exacerbation.  Placed on Bumex.  Pt reports that he is taking it.  Pt is not monitoring daily wts at home although he has a scale.  Explained to pt importance regarding daily wt monitoring and encouraged to start.  Follows with cardiology.   Pt on metoprolol.  Ran out and thought he had another refill.  Pt did not have any refills but pharmacy provided him with 5 day supply until he could get another script.  Script request sent to PCP at pt's request.     Plan of care:  Script request sent to PCP for metoprolol.  Denies need for any other meds at this time  Encouraged pt to start daily wt monitoring.     to mail out CHF zone tool sheet and low sodium diet ed.    Pt reports that he is more mindful of what and how much he eats.   Take pain medication sparingly.   Monitor for s/s of constipation.  Take stool softeners and laxatives PRN constipation  Close f/u with ortho re:

## 2024-06-10 DIAGNOSIS — I25.810 CORONARY ARTERY DISEASE INVOLVING CORONARY BYPASS GRAFT OF NATIVE HEART WITHOUT ANGINA PECTORIS: ICD-10-CM

## 2024-06-10 RX ORDER — ISOSORBIDE MONONITRATE 120 MG/1
120 TABLET, EXTENDED RELEASE ORAL DAILY
Qty: 90 TABLET | Refills: 3 | Status: SHIPPED | OUTPATIENT
Start: 2024-06-10

## 2024-06-11 RX ORDER — METOPROLOL SUCCINATE 100 MG/1
100 TABLET, EXTENDED RELEASE ORAL DAILY
Qty: 90 TABLET | Refills: 3 | Status: SHIPPED | OUTPATIENT
Start: 2024-06-11

## 2024-06-12 ENCOUNTER — PROCEDURE VISIT (OUTPATIENT)
Dept: CARDIOLOGY CLINIC | Age: 59
End: 2024-06-12

## 2024-06-12 DIAGNOSIS — I50.20 SYSTOLIC CONGESTIVE HEART FAILURE, NYHA CLASS 2, UNSPECIFIED CONGESTIVE HEART FAILURE CHRONICITY (HCC): ICD-10-CM

## 2024-06-12 DIAGNOSIS — Z95.810 S/P ICD (INTERNAL CARDIAC DEFIBRILLATOR) PROCEDURE: Primary | ICD-10-CM

## 2024-06-12 NOTE — PROGRESS NOTES
[employer] Medtronic Dual ICD Optivol  Pt of Baki    Battery 2 months - on battery watch    Optivol WNL    Episodes  NS VT 6/4-6/5

## 2024-06-13 ENCOUNTER — HOSPITAL ENCOUNTER (OUTPATIENT)
Dept: PREADMISSION TESTING | Age: 59
Discharge: HOME | End: 2024-06-13
Payer: COMMERCIAL

## 2024-06-13 ENCOUNTER — HOSPITAL ENCOUNTER (OUTPATIENT)
Dept: GENERAL RADIOLOGY | Age: 59
Discharge: HOME | End: 2024-06-13
Payer: COMMERCIAL

## 2024-06-13 VITALS
SYSTOLIC BLOOD PRESSURE: 159 MMHG | BODY MASS INDEX: 34.7 KG/M2 | WEIGHT: 256.17 LBS | RESPIRATION RATE: 18 BRPM | HEART RATE: 75 BPM | TEMPERATURE: 98 F | DIASTOLIC BLOOD PRESSURE: 75 MMHG | OXYGEN SATURATION: 99 % | HEIGHT: 72 IN

## 2024-06-13 DIAGNOSIS — Z01.818 PREOP EXAMINATION: ICD-10-CM

## 2024-06-13 LAB
ANION GAP SERPL CALC-SCNC: 13 MEQ/L (ref 8–16)
APTT PPP: 43.5 SECONDS (ref 22–38)
BUN SERPL-MCNC: 40 MG/DL (ref 7–22)
CALCIUM SERPL-MCNC: 9.3 MG/DL (ref 8.5–10.5)
CHLORIDE SERPL-SCNC: 106 MEQ/L (ref 98–111)
CO2 SERPL-SCNC: 18 MEQ/L (ref 23–33)
CREAT SERPL-MCNC: 3.9 MG/DL (ref 0.4–1.2)
DEPRECATED MEAN GLUCOSE BLD GHB EST-ACNC: 141 MG/DL (ref 70–126)
DEPRECATED RDW RBC AUTO: 55.6 FL (ref 35–45)
EKG ATRIAL RATE: 70 BPM
EKG P AXIS: 78 DEGREES
EKG P-R INTERVAL: 210 MS
EKG Q-T INTERVAL: 414 MS
EKG QRS DURATION: 94 MS
EKG QTC CALCULATION (BAZETT): 447 MS
EKG R AXIS: -9 DEGREES
EKG T AXIS: 98 DEGREES
EKG VENTRICULAR RATE: 70 BPM
ERYTHROCYTE [DISTWIDTH] IN BLOOD BY AUTOMATED COUNT: 17 % (ref 11.5–14.5)
GFR SERPL CREATININE-BSD FRML MDRD: 17 ML/MIN/1.73M2
GLUCOSE SERPL-MCNC: 154 MG/DL (ref 70–108)
HBA1C MFR BLD HPLC: 6.7 % (ref 4.4–6.4)
HCT VFR BLD AUTO: 25 % (ref 42–52)
HGB BLD-MCNC: 7.3 GM/DL (ref 14–18)
INR PPP: 2.49 (ref 0.85–1.13)
MCH RBC QN AUTO: 26.3 PG (ref 26–33)
MCHC RBC AUTO-ENTMCNC: 29.2 GM/DL (ref 32.2–35.5)
MCV RBC AUTO: 89.9 FL (ref 80–94)
MRSA DNA SPEC QL NAA+PROBE: NEGATIVE
PLATELET # BLD AUTO: 199 THOU/MM3 (ref 130–400)
PMV BLD AUTO: 10.4 FL (ref 9.4–12.4)
POTASSIUM SERPL-SCNC: 4 MEQ/L (ref 3.5–5.2)
RBC # BLD AUTO: 2.78 MILL/MM3 (ref 4.7–6.1)
SODIUM SERPL-SCNC: 137 MEQ/L (ref 135–145)
WBC # BLD AUTO: 8.9 THOU/MM3 (ref 4.8–10.8)

## 2024-06-13 PROCEDURE — 85610 PROTHROMBIN TIME: CPT

## 2024-06-13 PROCEDURE — 85027 COMPLETE CBC AUTOMATED: CPT

## 2024-06-13 PROCEDURE — 87641 MR-STAPH DNA AMP PROBE: CPT

## 2024-06-13 PROCEDURE — 93010 ELECTROCARDIOGRAM REPORT: CPT | Performed by: INTERNAL MEDICINE

## 2024-06-13 PROCEDURE — 93005 ELECTROCARDIOGRAM TRACING: CPT | Performed by: ORTHOPAEDIC SURGERY

## 2024-06-13 PROCEDURE — 36415 COLL VENOUS BLD VENIPUNCTURE: CPT

## 2024-06-13 PROCEDURE — 80048 BASIC METABOLIC PNL TOTAL CA: CPT

## 2024-06-13 PROCEDURE — 83036 HEMOGLOBIN GLYCOSYLATED A1C: CPT

## 2024-06-13 PROCEDURE — 85730 THROMBOPLASTIN TIME PARTIAL: CPT

## 2024-06-13 PROCEDURE — 71046 X-RAY EXAM CHEST 2 VIEWS: CPT

## 2024-06-13 RX ORDER — ENOXAPARIN SODIUM 150 MG/ML
INJECTION SUBCUTANEOUS
COMMUNITY
Start: 2024-06-03

## 2024-06-13 RX ORDER — HYDROCODONE BITARTRATE AND ACETAMINOPHEN 5; 325 MG/1; MG/1
1 TABLET ORAL EVERY 6 HOURS PRN
COMMUNITY

## 2024-06-13 ASSESSMENT — PAIN DESCRIPTION - LOCATION: LOCATION: BACK;LEG

## 2024-06-13 ASSESSMENT — PAIN DESCRIPTION - FREQUENCY: FREQUENCY: INTERMITTENT

## 2024-06-13 ASSESSMENT — PAIN SCALES - GENERAL: PAINLEVEL_OUTOF10: 7

## 2024-06-13 ASSESSMENT — PAIN DESCRIPTION - PAIN TYPE: TYPE: CHRONIC PAIN

## 2024-06-13 ASSESSMENT — PAIN - FUNCTIONAL ASSESSMENT: PAIN_FUNCTIONAL_ASSESSMENT: PREVENTS OR INTERFERES SOME ACTIVE ACTIVITIES AND ADLS

## 2024-06-13 ASSESSMENT — PAIN DESCRIPTION - ORIENTATION: ORIENTATION: RIGHT;LEFT

## 2024-06-13 ASSESSMENT — PAIN DESCRIPTION - DESCRIPTORS: DESCRIPTORS: OTHER (COMMENT)

## 2024-06-13 NOTE — PROGRESS NOTES
7Hold 5 days before surgery  Brilinta Coumadin     Hold 2 days before surgery  Linagliptin (Tradjenta)     Hold 3 days before surgery   Farxiga     Hold day of surgery  Bumex     Decrease Lantus to 5 units night before surgery     Take am of surgery  Isosorbide Mononitrate, Amiodarone, Mexiletine, Hydralazine Metoprolo    Lumbar Spine Surgery Pre-op Instructions  Nothing to eat or drink after midnight except sips of water to take medications, this includes no mints, chewing gum or ice chips  No Smoking or chewing tobacco 24 hours before surgery  Bring your medications in the original bottles   Bring photo ID and any health insurance cards  Wear comfortable clean loose fitting clothing  Do not wear any jewelry, make up, body piercings or contact lenses  Bring your walker  Bring your back brace if you were given one  Bring your CPAP machine if you have one  Wear clean clothes to bed and place fresh clean sheets and pillowcases on your bed the night before surgery  Patient viewed physical therapy video  Routine preop instructions given for pain, hand hygiene, fall prevention, infection prevention, anesthesia, cough and deep breath, and progressive diet and ambulation  Showering:  Shower 2 days before, day before and morning of surgery using the StartClean® kit provided (follow the instructions provided with the kit).   Do not shave the surgical area! If needed, your nurse will use clippers to remove any excess hair at the surgical site when you come in for surgery. Do not use a razor on the site of your surgery for at least 2 days prior to surgery because shaving can leave tiny nicks in the skin which may allow germs to enter and cause infection.  Perform the exercises given in your booklet 3 times daily starting today  Please have 2 Home Health Agencies in mind for post op care; 1st choice and 2nd choice.   Remember- Post Op NO BLTS ; No Bending, No Lifting, No Twisting until Dr say its ok.             START CLEAN®  wash from your neck down.  This is very important. Do not use the soap on your face or hair and avoid private areas.  Rinse your body thoroughly. This is very important.  Using a fresh, clean towel, dry your body.  Dress warmly with freshly washed clean clothes. Keeping warm before surgery decreases your risk of developing and infection.  Do not use lotions, powders, or creams after this shower.             Pt is having  pain.  It is 7 /10 today. Pain scale and pain management review with patient. In lower back worse with walking        Preliminary Discharge Planning Questionnaire  Date of Surgery   7-2-24   Surgeon [facility]        Having the proper help and care after surgery is very important to your recovery. Who will be able to help you at home when you are discharged from the hospital?                       lives alone  How many steps to enter your home?  3     Bathroom on first floor?  no     Bedroom on the first floor?  no     Do you have an elevated toilet seat to use at home?  no     Do you have a walker to use at home?    Total Joints - with wheels no  Spine - with wheels  no     Have you been doing home exercises?no     *You will go home with some outpatient physical therapy, where do you prefer to go? unsure will let us know    This typically will not start until after your post visit to your Dr. (3-4 weeks after surgery)     * What home health agency would you like to use?[facility]        List of all Home Health Agencies Available given to patient, with website ( per Social Work Team)      Please have 2 preferences when you come for surgery- 1st and 2nd choice

## 2024-06-13 NOTE — DISCHARGE INSTRUCTIONS
Hold 5 days before surgery  Brilinta Coumadin    Hold 2 days before surgery  Linagliptin (Tradjenta)    Hold 3 days before surgery   Farxiga    Hold day of surgery  Bumex    Decrease Lantus to 5 units night before surgery    Take am of surgery  Isosorbide Mononitrate, Amiodarone, Mexiletine, Hydralazine Metoprolol         Lumbar Spine Surgery Pre-op Instructions  Nothing to eat or drink after midnight except sips of water to take medications, this includes no mints, chewing gum or ice chips  No Smoking or chewing tobacco 24 hours before surgery  Bring your medications in the original bottles   Bring photo ID and any health insurance cards  Wear comfortable clean loose fitting clothing  Do not wear any jewelry, make up, body piercings or contact lenses  Bring your walker  Bring your back brace if you were given one  Bring your CPAP machine if you have one  Wear clean clothes to bed and place fresh clean sheets and pillowcases on your bed the night before surgery  Patient viewed physical therapy video  Routine preop instructions given for pain, hand hygiene, fall prevention, infection prevention, anesthesia, cough and deep breath, and progressive diet and ambulation  Showering:  Shower 2 days before, day before and morning of surgery using the StartClean® kit provided (follow the instructions provided with the kit).   Do not shave the surgical area! If needed, your nurse will use clippers to remove any excess hair at the surgical site when you come in for surgery. Do not use a razor on the site of your surgery for at least 2 days prior to surgery because shaving can leave tiny nicks in the skin which may allow germs to enter and cause infection.  Perform the exercises given in your booklet 3 times daily starting today  Please have 2 Home Health Agencies in mind for post op care; 1st choice and 2nd choice.   Remember- Post Op NO BLTS ; No Bending, No Lifting, No Twisting until Dr say its ok.      STARTCLEAN® KIT SHOWER  INSTRUCITONS    __0-36-62______ (date)   FIRST SHOWER: Two days before surgery: Take a shower and wash your entire body, including your hair and scalp in the following manner:  Wash your hair using normal shampoo. Make sure you rinse the shampoo from your hair and body. Wash your face with your regular soap or cleanser.  Use one of the sponges in the StartClean® kit and apply 1/3 of the Chlorhexidine soap to the sponge, wash from your neck down.  This is very important. Do not use the soap on your face or hair and avoid private areas.  Rinse your body thoroughly. This is very important.  Using a fresh, clean towel, dry your body.  Dress in freshly washed clothes.  Do not use lotions, powders, or creams after this shower.      __3-1-80______ (date)   SECOND SHOWER: The day before surgery: Take a shower and wash your entire body, including your hair and scalp in the following manner:  Wash your hair using normal shampoo. Make sure you rinse the shampoo from your hair and body. Wash your face with your regular soap or cleanser.  Use one of the sponges in the StartClean® kit and apply 1/3 of the Chlorhexidine soap to the sponge, wash from your neck down.  This is very important. Do not use the soap on your face or hair and avoid private areas.  Rinse your body thoroughly. This is very important.  Using a fresh, clean towel, dry your body.  Dress in freshly washed clothes.  Fresh clean sheets and pillow cases should be used after this shower.  Do not use lotions, powders, or creams after this shower.    __9-7-23______ (date)   THE FINAL SHOWER: The morning of surgery: Take a shower and wash your entire body, including your hair and scalp in the following manner:  Wash your hair using normal shampoo. Make sure you rinse the shampoo from your hair and body. Wash your face with your regular soap or cleanser.  Use one of the sponges in the StartClean® kit and apply 1/3 of the Chlorhexidine soap to the sponge, wash from your

## 2024-06-13 NOTE — PROGRESS NOTES
Hold 5 days before surgery  Brilinta Coumadin    Hold 2 days before surgery  Linagliptin (Tradjenta)    Hold 3 days before surgery   Farxiga    Hold day of surgery  Bumex    Decrease Lantus to 5 units night before surgery    Take am of surgery  Isosorbide Mononitrate, Amiodarone, Mexiletine, Hydralazine Metoprolol

## 2024-06-13 NOTE — PROGRESS NOTES
Called and left message for jaqueline at [facility] medical records for Clearance from Dr Adorno.

## 2024-06-13 NOTE — PROGRESS NOTES
Called and left message with Mario Alberto at [employer] in regards to patient is having lumbar fusion and has pacemaker

## 2024-06-13 NOTE — PROGRESS NOTES
Called ivelisse with Dr Waller to inform her of labs that we have back that are abnormal  CBC- HBg 7.3 Hct 25.0    BMP Creatinine 3.9 BUN 40 GFR 17    Had to leave message

## 2024-06-14 ENCOUNTER — CARE COORDINATION (OUTPATIENT)
Dept: CARE COORDINATION | Age: 59
End: 2024-06-14

## 2024-06-14 NOTE — CARE COORDINATION
Attempted to reach patient for continued Care Coordination follow up and education.  Patient was unavailable at the time of my call, and a generic voicemail message was left asking patient to return my call at [phone].

## 2024-06-19 ENCOUNTER — OFFICE VISIT (OUTPATIENT)
Dept: FAMILY MEDICINE CLINIC | Age: 59
End: 2024-06-19

## 2024-06-19 VITALS
RESPIRATION RATE: 16 BRPM | BODY MASS INDEX: 35.25 KG/M2 | WEIGHT: 263.1 LBS | DIASTOLIC BLOOD PRESSURE: 64 MMHG | HEART RATE: 68 BPM | SYSTOLIC BLOOD PRESSURE: 128 MMHG

## 2024-06-19 DIAGNOSIS — N18.4 CKD (CHRONIC KIDNEY DISEASE) STAGE 4, GFR 15-29 ML/MIN (HCC): ICD-10-CM

## 2024-06-19 DIAGNOSIS — F41.3 OTHER MIXED ANXIETY DISORDERS: ICD-10-CM

## 2024-06-19 DIAGNOSIS — I25.810 CORONARY ARTERY DISEASE INVOLVING CORONARY BYPASS GRAFT OF NATIVE HEART WITHOUT ANGINA PECTORIS: ICD-10-CM

## 2024-06-19 DIAGNOSIS — I42.0 DILATED CARDIOMYOPATHY (HCC): ICD-10-CM

## 2024-06-19 DIAGNOSIS — Z01.818 PREOP EXAMINATION: Primary | ICD-10-CM

## 2024-06-19 DIAGNOSIS — M54.50 CHRONIC BILATERAL LOW BACK PAIN, UNSPECIFIED WHETHER SCIATICA PRESENT: ICD-10-CM

## 2024-06-19 DIAGNOSIS — G89.29 CHRONIC BILATERAL LOW BACK PAIN, UNSPECIFIED WHETHER SCIATICA PRESENT: ICD-10-CM

## 2024-06-19 DIAGNOSIS — D63.8 ANEMIA, CHRONIC DISEASE: ICD-10-CM

## 2024-06-19 DIAGNOSIS — I50.22 CHRONIC SYSTOLIC CONGESTIVE HEART FAILURE (HCC): ICD-10-CM

## 2024-06-19 DIAGNOSIS — M48.061 FORAMINAL STENOSIS OF LUMBAR REGION: ICD-10-CM

## 2024-06-19 DIAGNOSIS — E78.2 MIXED HYPERLIPIDEMIA: ICD-10-CM

## 2024-06-19 DIAGNOSIS — I10 ESSENTIAL HYPERTENSION: ICD-10-CM

## 2024-06-19 DIAGNOSIS — E11.22 CONTROLLED TYPE 2 DIABETES MELLITUS WITH CHRONIC KIDNEY DISEASE, WITHOUT LONG-TERM CURRENT USE OF INSULIN, UNSPECIFIED CKD STAGE (HCC): ICD-10-CM

## 2024-06-19 DIAGNOSIS — Z95.810 ICD (IMPLANTABLE CARDIOVERTER-DEFIBRILLATOR) IN PLACE: ICD-10-CM

## 2024-06-19 SDOH — ECONOMIC STABILITY: INCOME INSECURITY: HOW HARD IS IT FOR YOU TO PAY FOR THE VERY BASICS LIKE FOOD, HOUSING, MEDICAL CARE, AND HEATING?: NOT HARD AT ALL

## 2024-06-19 SDOH — ECONOMIC STABILITY: FOOD INSECURITY: WITHIN THE PAST 12 MONTHS, YOU WORRIED THAT YOUR FOOD WOULD RUN OUT BEFORE YOU GOT MONEY TO BUY MORE.: NEVER TRUE

## 2024-06-19 SDOH — ECONOMIC STABILITY: FOOD INSECURITY: WITHIN THE PAST 12 MONTHS, THE FOOD YOU BOUGHT JUST DIDN'T LAST AND YOU DIDN'T HAVE MONEY TO GET MORE.: NEVER TRUE

## 2024-06-19 ASSESSMENT — ENCOUNTER SYMPTOMS
COUGH: 0
NAUSEA: 0
ABDOMINAL PAIN: 0
BACK PAIN: 1
SHORTNESS OF BREATH: 0
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
## 2024-06-19 NOTE — PATIENT INSTRUCTIONS
You may receive a survey about your visit with us today. The feedback from our patients helps us identify what is working well and where the service to all patients can be enhanced. Thank you!     Tobacco Cessation Programs     Telephonic behavior modification  1-800-QUIT-NOW (365-1072)  Counseling service for those who are ready to quit using tobacco.    Available for uninsured [place] residents, Medicaid recipients, pregnant women, or patients whose health plans or employers are members of the [place] [employer]    Online behavior modification  http://[place].Quitlogix.org  Online support program to help patients through each step of the quitting process.  Available 24 hours a day 7 days a week.  Provides up to date researched based tool, step-by-step guides, and motivational messages.    Online behavior modification  www.lungusa.org/stop-smoking/how-to-quit  HelpLine: 1-800-LUNG-USA (542-9487)  Email questions to:  questions@[facility].org   Website offers resources to help tobacco users figure out their reasons for quitting and then take the big step of quitting for good.    Hypnosis  Location: [address] [place] [address]  Contact: Robert Marie, PhD at [phone]  Hypnosis for tobacco cessation  Cost $225 for the initial session and $175 for each session afterwards.  Most patients require 6-8 sessions.  There is the option to submit through the patient’s insurance.    Hypnosis and behavior modification  Location: [address] [place] [address]  Contact: Romie Ayala, PhD at [phone]  Counseling and hypnosis for nicotine addition  Cost: For uninsured patients:  Please call above phone number  Cost for insured patients depends on patient’s insurance plan.    Behavior modification  Location: [facility], [address] [place] [address]  Contact: [phone]  Services include four one-on-one appointments between the patient and a respiratory therapist.  The four appointments

## 2024-06-20 ENCOUNTER — APPOINTMENT (OUTPATIENT)
Dept: CT IMAGING | Age: 59
End: 2024-06-20
Payer: COMMERCIAL

## 2024-06-20 ENCOUNTER — HOSPITAL ENCOUNTER (EMERGENCY)
Age: 59
Discharge: LEFT BEFORE DISCHARGE | End: 2024-06-20
Attending: EMERGENCY MEDICINE
Payer: COMMERCIAL

## 2024-06-20 VITALS
RESPIRATION RATE: 18 BRPM | SYSTOLIC BLOOD PRESSURE: 136 MMHG | TEMPERATURE: 98.3 F | BODY MASS INDEX: 35.21 KG/M2 | OXYGEN SATURATION: 99 % | DIASTOLIC BLOOD PRESSURE: 70 MMHG | WEIGHT: 260 LBS | HEART RATE: 57 BPM | HEIGHT: 72 IN

## 2024-06-20 DIAGNOSIS — K80.00 ACUTE CALCULOUS CHOLECYSTITIS: ICD-10-CM

## 2024-06-20 DIAGNOSIS — S00.03XA HEMATOMA OF SCALP, INITIAL ENCOUNTER: ICD-10-CM
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
## 2024-06-20 LAB
ANION GAP SERPL CALC-SCNC: 13 MEQ/L (ref 8–16)
APTT PPP: 39.4 SECONDS (ref 22–38)
BUN SERPL-MCNC: 47 MG/DL (ref 7–22)
CALCIUM SERPL-MCNC: 8.1 MG/DL (ref 8.5–10.5)
CHLORIDE SERPL-SCNC: 101 MEQ/L (ref 98–111)
CO2 SERPL-SCNC: 20 MEQ/L (ref 23–33)
CREAT SERPL-MCNC: 3.9 MG/DL (ref 0.4–1.2)
GFR SERPL CREATININE-BSD FRML MDRD: 17 ML/MIN/1.73M2
GLUCOSE SERPL-MCNC: 279 MG/DL (ref 70–108)
INR PPP: 2.11 (ref 0.85–1.13)
OSMOLALITY SERPL CALC.SUM OF ELEC: 290.5 MOSMOL/KG (ref 275–300)
POTASSIUM SERPL-SCNC: 3.9 MEQ/L (ref 3.5–5.2)
SCAN OF BLOOD SMEAR: NORMAL
SODIUM SERPL-SCNC: 134 MEQ/L (ref 135–145)

## 2024-06-20 PROCEDURE — 6370000000 HC RX 637 (ALT 250 FOR IP): Performed by: EMERGENCY MEDICINE

## 2024-06-20 PROCEDURE — 93005 ELECTROCARDIOGRAM TRACING: CPT | Performed by: EMERGENCY MEDICINE

## 2024-06-20 PROCEDURE — 76376 3D RENDER W/INTRP POSTPROCES: CPT

## 2024-06-20 PROCEDURE — 72125 CT NECK SPINE W/O DYE: CPT

## 2024-06-20 PROCEDURE — 85025 COMPLETE CBC W/AUTO DIFF WBC: CPT

## 2024-06-20 PROCEDURE — 70450 CT HEAD/BRAIN W/O DYE: CPT

## 2024-06-20 PROCEDURE — 74176 CT ABD & PELVIS W/O CONTRAST: CPT

## 2024-06-20 PROCEDURE — 99284 EMERGENCY DEPT VISIT MOD MDM: CPT

## 2024-06-20 PROCEDURE — 85610 PROTHROMBIN TIME: CPT

## 2024-06-20 PROCEDURE — 36415 COLL VENOUS BLD VENIPUNCTURE: CPT

## 2024-06-20 PROCEDURE — 80048 BASIC METABOLIC PNL TOTAL CA: CPT

## 2024-06-20 PROCEDURE — 85730 THROMBOPLASTIN TIME PARTIAL: CPT

## 2024-06-20 PROCEDURE — 71250 CT THORAX DX C-: CPT

## 2024-06-20 RX ORDER — ACETAMINOPHEN 500 MG
1000 TABLET ORAL ONCE
Status: COMPLETED | OUTPATIENT
Start: 2024-06-20 | End: 2024-06-20

## 2024-06-20 RX ADMIN — ACETAMINOPHEN 1000 MG: 500 TABLET ORAL at 18:39

## 2024-06-20 ASSESSMENT — PAIN DESCRIPTION - ORIENTATION: ORIENTATION: LEFT

## 2024-06-20 ASSESSMENT — PAIN SCALES - GENERAL: PAINLEVEL_OUTOF10: 6

## 2024-06-20 ASSESSMENT — PAIN DESCRIPTION - LOCATION: LOCATION: KNEE

## 2024-06-20 ASSESSMENT — PAIN - FUNCTIONAL ASSESSMENT: PAIN_FUNCTIONAL_ASSESSMENT: 0-10
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
## 2024-06-20 NOTE — ED NOTES
Pt sitting on cot with easy and unlabored respirations. Pt provided with blanket per request. No questions or concerns voiced at this time. Call light in reach

## 2024-06-21 ENCOUNTER — TELEPHONE (OUTPATIENT)
Dept: FAMILY MEDICINE CLINIC | Age: 59
End: 2024-06-21

## 2024-06-21 ENCOUNTER — CARE COORDINATION (OUTPATIENT)
Dept: CARE COORDINATION | Age: 59
End: 2024-06-21

## 2024-06-21 LAB
AUTO DIFF PNL BLD: ABNORMAL
BASOPHILS ABSOLUTE: 0.1 THOU/MM3 (ref 0–0.1)
BASOPHILS NFR BLD AUTO: 0.8 %
DEPRECATED RDW RBC AUTO: 58 FL (ref 35–45)
EKG ATRIAL RATE: 61 BPM
EKG P AXIS: 48 DEGREES
EKG P-R INTERVAL: 192 MS
EKG Q-T INTERVAL: 460 MS
EKG QRS DURATION: 96 MS
EKG QTC CALCULATION (BAZETT): 463 MS
EKG R AXIS: 17 DEGREES
EKG T AXIS: 83 DEGREES
EKG VENTRICULAR RATE: 61 BPM
EOSINOPHIL NFR BLD AUTO: 2.6 %
EOSINOPHILS ABSOLUTE: 0.2 THOU/MM3 (ref 0–0.4)
ERYTHROCYTE [DISTWIDTH] IN BLOOD BY AUTOMATED COUNT: 17.1 % (ref 11.5–14.5)
HCT VFR BLD AUTO: 22.8 % (ref 42–52)
HGB BLD-MCNC: 6.5 GM/DL (ref 14–18)
HYPOCHROMIA BLD QL SMEAR: PRESENT
IMM GRANULOCYTES # BLD AUTO: 0.03 THOU/MM3 (ref 0–0.07)
IMM GRANULOCYTES NFR BLD AUTO: 0.5 %
LYMPHOCYTES ABSOLUTE: 0.4 THOU/MM3 (ref 1–4.8)
LYMPHOCYTES NFR BLD AUTO: 5.6 %
MCH RBC QN AUTO: 26.4 PG (ref 26–33)
MCHC RBC AUTO-ENTMCNC: 28.5 GM/DL (ref 32.2–35.5)
MCV RBC AUTO: 92.7 FL (ref 80–94)
MONOCYTES ABSOLUTE: 0.5 THOU/MM3 (ref 0.4–1.3)
MONOCYTES NFR BLD AUTO: 7.7 %
NEUTROPHILS ABSOLUTE: 5.5 THOU/MM3 (ref 1.8–7.7)
NEUTROPHILS NFR BLD AUTO: 82.8 %
NRBC BLD AUTO-RTO: 0 /100 WBC
PATHOLOGIST REVIEW: ABNORMAL
PLATELET # BLD AUTO: 177 THOU/MM3 (ref 130–400)
PLATELET BLD QL SMEAR: ADEQUATE
PMV BLD AUTO: 9.8 FL (ref 9.4–12.4)
POLYCHROMASIA BLD QL SMEAR: ABNORMAL
RBC # BLD AUTO: 2.46 MILL/MM3 (ref 4.7–6.1)
WBC # BLD AUTO: 6.6 THOU/MM3 (ref 4.8–10.8)

## 2024-06-21 PROCEDURE — 93010 ELECTROCARDIOGRAM REPORT: CPT | Performed by: INTERNAL MEDICINE

## 2024-06-21 NOTE — CARE COORDINATION
Ambulatory Care Coordination  ED Follow up Call    Reason for ED visit:  fall   Status:     not changed    Did you call your PCP prior to going to the ED?  No      Did you receive a discharge instructions from the Emergency Room? Yes  Review of Instructions:     Understands what to report/when to return?:  Yes   Understands discharge instructions?:  Yes   Following discharge instructions?:  Yes   If not why? N/a    Are there any new complaints of pain? No  New Pain Meds? No    Constipation prophylaxis needed?  N/A    If you have a wound is the dressing clean, dry, and intact? N/A  Understands wound care regimen? N/A    Are there any other complaints/concerns that you wish to tell your provider?   no    FU appts/Provider:    Future Appointments   Date Time Provider Department Center   7/29/2024  3:00 PM Samuel Kathleen MD [facility] [place]   8/12/2024  3:30 PM [facility] PULMONARY FUNCTION ROOM 1 [facility] PFT [facility]   8/20/2024  3:30 PM Leonel Adorno, APRN - CNP [facility] [place]   8/21/2024  3:30 PM Shelly Johnson MD N [facility] [place]   2/28/2025  1:45 PM Fany Guzmán MD [facility] [place]           New Medications?:   No      Medication Reconciliation by phone - No  Understands Medications?  Yes  Taking Medications? Yes  Can you swallow your pills?  Yes    Any further needs in the home i.e. Equipment?  No    Link to services in community?:  No   Which services:  n/a  Spoke with pt today.  Pt left ED AMA. Did not want to be admitted.  Denies any worsening s/s since d/c home.  Pt overwhelmed with findings in ED-cholecystitis, hgb drop.  Pt shared that his primary concern has been getting back surgery on 7/2. Does not have another appt with [facility] until 7/1.   Pt has Dr. Monge's phone number and will call to arrange appt re: gallbladder.    Pt denies abd pain, nausea.    Pt denies dizziness but admits that he has felt weaker and more fatigued recently.   Asking how he should proceed at

## 2024-06-21 NOTE — SIGNIFICANT EVENT
In spite of multiple attempts by myself and staff to convince the patient to stay for further evaluation and treatment, we have unfortunately been unsuccessful. However, the patient has the capacity to give, receive, and withhold information. The patient does not appear intoxicated or responding to external stimuli. The patient verbalizes understanding of their condition and symptoms and that this represents a significant threat. Risks and benefits of staying for further evaluation and treatment or leaving AMA have been discussed and acknowledged by the patient. The patient has verbalized to me that they understand the plan of diagnosis and treatment, and unfortunately will not agree and understand that it may cause worsening of their condition or death. The patient understands that they are welcome to return to the ED at any time for further care without prejudice and we will be happy to provide treatment again.

## 2024-06-21 NOTE — TELEPHONE ENCOUNTER
Pt called office stating he is scheduled for a lumbar fusion in 1.5wks with Dr. Waller. Pt fell yesterday and went to [facility] ED for eval. On the scans done at the ED it showed gall stones. Pt wants to know how this will affect his lumbar surgery. Please advise.    Advised pt per ED notes he is to see Gen Surgery Dr. Monge, pt says he has not called them yet about the gall stones. Pt says he also reached out to [facility] to see what Dr. Waller thinks of the galls stones and surgery, but has not heard back from them yet. Pt wants to see what his PCP thinks.

## 2024-06-21 NOTE — DISCHARGE INSTRUCTIONS
You were seen here today for a fall. Make sure to follow up with your primary care doctor and with Dr Monge tomorrow for further evaluation of your drop in hemoglobin as well as for your infected gallbladder with stones. Return here if you develop abdominal pain, fever, chest pain, difficulty breathing, change in your vision, difficulty moving your arms or legs, speech changes.

## 2024-06-21 NOTE — TELEPHONE ENCOUNTER
The difficult is that the scans showed it but if he is not having any symptoms suggestive of gallbladder attack then it is question if the scan is being overread.    Either way I would suspect Dr. Waller would want this evaluated by GEN SURG and cleared before his back surgery.   Contact GEN SURG.     Dr. Camacho Hunt # [phone]

## 2024-06-21 NOTE — TELEPHONE ENCOUNTER
Spoke with Garrett.  Pt has appt next wk with Dr. Monge.  Advised pt to keep appt on 6/26 with Dr. Stroud.  Pt has been in contact with [facility] and he informed them of appts next wk and he will update them after he has been seen at PCP office and general surgeon.
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
## 2024-06-24 NOTE — TELEPHONE ENCOUNTER
Message left for Janel-Dr. Guido's assistant at [facility] informing her of pt's drop in hgb.  Provided her with this [facility] contact number to reach out with any further questions.

## 2024-06-25 ENCOUNTER — NURSE ONLY (OUTPATIENT)
Dept: LAB | Age: 59
End: 2024-06-25

## 2024-06-25 ENCOUNTER — OFFICE VISIT (OUTPATIENT)
Dept: FAMILY MEDICINE CLINIC | Age: 59
End: 2024-06-25
Payer: COMMERCIAL

## 2024-06-25 VITALS
HEART RATE: 76 BPM | BODY MASS INDEX: 36.52 KG/M2 | RESPIRATION RATE: 18 BRPM | HEIGHT: 72 IN | SYSTOLIC BLOOD PRESSURE: 136 MMHG | DIASTOLIC BLOOD PRESSURE: 70 MMHG | WEIGHT: 269.6 LBS

## 2024-06-25 DIAGNOSIS — I42.0 DILATED CARDIOMYOPATHY (HCC): ICD-10-CM

## 2024-06-25 DIAGNOSIS — K80.00 CALCULUS OF GALLBLADDER WITH ACUTE CHOLECYSTITIS WITHOUT OBSTRUCTION: Primary | ICD-10-CM

## 2024-06-25 DIAGNOSIS — M48.061 FORAMINAL STENOSIS OF LUMBAR REGION: ICD-10-CM

## 2024-06-25 DIAGNOSIS — I25.810 CORONARY ARTERY DISEASE INVOLVING CORONARY BYPASS GRAFT OF NATIVE HEART WITHOUT ANGINA PECTORIS: ICD-10-CM

## 2024-06-25 DIAGNOSIS — D63.8 ANEMIA, CHRONIC DISEASE: ICD-10-CM

## 2024-06-25 DIAGNOSIS — I50.22 CHRONIC SYSTOLIC CONGESTIVE HEART FAILURE (HCC): ICD-10-CM

## 2024-06-25 DIAGNOSIS — Z91.81 HISTORY OF RECENT FALL: ICD-10-CM

## 2024-06-25 DIAGNOSIS — M54.50 CHRONIC BILATERAL LOW BACK PAIN, UNSPECIFIED WHETHER SCIATICA PRESENT: ICD-10-CM

## 2024-06-25 DIAGNOSIS — G89.29 CHRONIC BILATERAL LOW BACK PAIN, UNSPECIFIED WHETHER SCIATICA PRESENT: ICD-10-CM

## 2024-06-25 DIAGNOSIS — E11.22 CONTROLLED TYPE 2 DIABETES MELLITUS WITH CHRONIC KIDNEY DISEASE, WITHOUT LONG-TERM CURRENT USE OF INSULIN, UNSPECIFIED CKD STAGE (HCC): ICD-10-CM

## 2024-06-25 DIAGNOSIS — N18.4 CKD (CHRONIC KIDNEY DISEASE) STAGE 4, GFR 15-29 ML/MIN (HCC): ICD-10-CM

## 2024-06-25 LAB
BASOPHILS ABSOLUTE: 0 THOU/MM3 (ref 0–0.1)
BASOPHILS NFR BLD AUTO: 0.7 %
DEPRECATED RDW RBC AUTO: 57.1 FL (ref 35–45)
EOSINOPHIL NFR BLD AUTO: 2.5 %
EOSINOPHILS ABSOLUTE: 0.2 THOU/MM3 (ref 0–0.4)
ERYTHROCYTE [DISTWIDTH] IN BLOOD BY AUTOMATED COUNT: 17.2 % (ref 11.5–14.5)
HCT VFR BLD AUTO: 23.1 % (ref 42–52)
HGB BLD-MCNC: 6.5 GM/DL (ref 14–18)
HYPOCHROMIA BLD QL SMEAR: PRESENT
IMM GRANULOCYTES # BLD AUTO: 0.04 THOU/MM3 (ref 0–0.07)
IMM GRANULOCYTES NFR BLD AUTO: 0.6 %
LYMPHOCYTES ABSOLUTE: 0.4 THOU/MM3 (ref 1–4.8)
LYMPHOCYTES NFR BLD AUTO: 5.3 %
MCH RBC QN AUTO: 25.8 PG (ref 26–33)
MCHC RBC AUTO-ENTMCNC: 28.1 GM/DL (ref 32.2–35.5)
MCV RBC AUTO: 91.7 FL (ref 80–94)
MONOCYTES ABSOLUTE: 0.6 THOU/MM3 (ref 0.4–1.3)
MONOCYTES NFR BLD AUTO: 9.2 %
NEUTROPHILS ABSOLUTE: 5.6 THOU/MM3 (ref 1.8–7.7)
NEUTROPHILS NFR BLD AUTO: 81.7 %
NRBC BLD AUTO-RTO: 0 /100 WBC
PLATELET # BLD AUTO: 168 THOU/MM3 (ref 130–400)
PMV BLD AUTO: 10.6 FL (ref 9.4–12.4)
RBC # BLD AUTO: 2.52 MILL/MM3 (ref 4.7–6.1)
WBC # BLD AUTO: 6.9 THOU/MM3 (ref 4.8–10.8)

## 2024-06-25 PROCEDURE — 99214 OFFICE O/P EST MOD 30 MIN: CPT | Performed by: FAMILY MEDICINE

## 2024-06-25 PROCEDURE — 3044F HG A1C LEVEL LT 7.0%: CPT | Performed by: FAMILY MEDICINE

## 2024-06-25 PROCEDURE — 3078F DIAST BP <80 MM HG: CPT | Performed by: FAMILY MEDICINE

## 2024-06-25 PROCEDURE — 3075F SYST BP GE 130 - 139MM HG: CPT | Performed by: FAMILY MEDICINE

## 2024-06-25 PROCEDURE — G2211 COMPLEX E/M VISIT ADD ON: HCPCS | Performed by: FAMILY MEDICINE

## 2024-06-25 NOTE — PROGRESS NOTES
specialists as scheduled, has appt with [facility] later this week    Return for Keep known appt.             An electronic signature was used to authenticate this note.    --Dangelo Stroud, DO

## 2024-06-25 NOTE — PATIENT INSTRUCTIONS
You may receive a survey about your visit with us today. The feedback from our patients helps us identify what is working well and where the service to all patients can be enhanced. Thank you!     Tobacco Cessation Programs     Telephonic behavior modification  1-800-QUIT-NOW (654-4694)  Counseling service for those who are ready to quit using tobacco.    Available for uninsured [place] residents, Medicaid recipients, pregnant women, or patients whose health plans or employers are members of the [place] [employer]    Online behavior modification  http://[place].Quitlogix.org  Online support program to help patients through each step of the quitting process.  Available 24 hours a day 7 days a week.  Provides up to date researched based tool, step-by-step guides, and motivational messages.    Online behavior modification  www.lungusa.org/stop-smoking/how-to-quit  HelpLine: 1-800-LUNG-USA (643-6379)  Email questions to:  questions@[employer].org   Website offers resources to help tobacco users figure out their reasons for quitting and then take the big step of quitting for good.    Hypnosis  Location: [address] [place] [address]  Contact: Robert Marie, PhD at [phone]  Hypnosis for tobacco cessation  Cost $225 for the initial session and $175 for each session afterwards.  Most patients require 6-8 sessions.  There is the option to submit through the patient’s insurance.    Hypnosis and behavior modification  Location: [address] [place] [address]  Contact: Romie Ayala, PhD at [phone]  Counseling and hypnosis for nicotine addition  Cost: For uninsured patients:  Please call above phone number  Cost for insured patients depends on patient’s insurance plan.    Behavior modification  Location: [facility], [address] [place] [address]  Contact: [phone]  Services include four one-on-one appointments between the patient and a respiratory therapist.  The four appointments

## 2024-06-26 ENCOUNTER — NURSE ONLY (OUTPATIENT)
Dept: LAB | Age: 59
End: 2024-06-26

## 2024-06-26 DIAGNOSIS — D63.8 ANEMIA, CHRONIC DISEASE: ICD-10-CM

## 2024-06-26 LAB
BASOPHILS ABSOLUTE: 0 THOU/MM3 (ref 0–0.1)
BASOPHILS NFR BLD AUTO: 0.5 %
DEPRECATED RDW RBC AUTO: 56.4 FL (ref 35–45)
EOSINOPHIL NFR BLD AUTO: 2.2 %
EOSINOPHILS ABSOLUTE: 0.2 THOU/MM3 (ref 0–0.4)
ERYTHROCYTE [DISTWIDTH] IN BLOOD BY AUTOMATED COUNT: 17.2 % (ref 11.5–14.5)
HCT VFR BLD AUTO: 25.1 % (ref 42–52)
HGB BLD-MCNC: 7.1 GM/DL (ref 14–18)
IMM GRANULOCYTES # BLD AUTO: 0.03 THOU/MM3 (ref 0–0.07)
IMM GRANULOCYTES NFR BLD AUTO: 0.4 %
LYMPHOCYTES ABSOLUTE: 0.4 THOU/MM3 (ref 1–4.8)
LYMPHOCYTES NFR BLD AUTO: 5.2 %
MCH RBC QN AUTO: 25.5 PG (ref 26–33)
MCHC RBC AUTO-ENTMCNC: 28.3 GM/DL (ref 32.2–35.5)
MCV RBC AUTO: 90.3 FL (ref 80–94)
MONOCYTES ABSOLUTE: 0.7 THOU/MM3 (ref 0.4–1.3)
MONOCYTES NFR BLD AUTO: 8.7 %
NEUTROPHILS ABSOLUTE: 6.3 THOU/MM3 (ref 1.8–7.7)
NEUTROPHILS NFR BLD AUTO: 83 %
NRBC BLD AUTO-RTO: 0 /100 WBC
PLATELET # BLD AUTO: 170 THOU/MM3 (ref 130–400)
PMV BLD AUTO: 10.9 FL (ref 9.4–12.4)
RBC # BLD AUTO: 2.78 MILL/MM3 (ref 4.7–6.1)
WBC # BLD AUTO: 7.6 THOU/MM3 (ref 4.8–10.8)

## 2024-06-26 ASSESSMENT — ENCOUNTER SYMPTOMS
BACK PAIN: 1
DIARRHEA: 0
NAUSEA: 0
GASTROINTESTINAL NEGATIVE: 1
RESPIRATORY NEGATIVE: 1
ABDOMINAL PAIN: 0
VOMITING: 0

## 2024-06-27 ENCOUNTER — OFFICE VISIT (OUTPATIENT)
Dept: SURGERY | Age: 59
End: 2024-06-27
Payer: COMMERCIAL

## 2024-06-27 VITALS
WEIGHT: 269.9 LBS | SYSTOLIC BLOOD PRESSURE: 128 MMHG | DIASTOLIC BLOOD PRESSURE: 70 MMHG | TEMPERATURE: 97.6 F | BODY MASS INDEX: 36.56 KG/M2 | HEART RATE: 93 BPM | RESPIRATION RATE: 18 BRPM | HEIGHT: 72 IN | OXYGEN SATURATION: 97 %

## 2024-06-27 DIAGNOSIS — K80.20 GALLSTONES: Primary | ICD-10-CM

## 2024-06-27 PROCEDURE — 99204 OFFICE O/P NEW MOD 45 MIN: CPT | Performed by: SURGERY

## 2024-06-27 PROCEDURE — 3078F DIAST BP <80 MM HG: CPT | Performed by: SURGERY

## 2024-06-27 PROCEDURE — 3074F SYST BP LT 130 MM HG: CPT | Performed by: SURGERY

## 2024-06-27 ASSESSMENT — ENCOUNTER SYMPTOMS
RECTAL PAIN: 0
ABDOMINAL DISTENTION: 0
DIARRHEA: 0
VOMITING: 0
CONSTIPATION: 0
NAUSEA: 0
ABDOMINAL PAIN: 0
SHORTNESS OF BREATH: 1
BLOOD IN STOOL: 0
ANAL BLEEDING: 0

## 2024-06-27 NOTE — PROGRESS NOTES
Faith Monge MD  [facility] H&P    Pt Name: Garrett Duncan  MRN: [id number]  YOB: 1965  Date of evaluation: 06/27/2024    Primary Care Physician: Leonel Adorno, APRN - CNP  Patient evaluated at the request of  ED  Reason for evaluation: gallstones   IMPRESSIONS:       ICD-10-CM    1. Gallstones  K80.20         has Coronary artery disease involving coronary bypass graft of native heart without angina pectoris; Acute neutrophilia; Status post lumbar surgery  few days ago; Pancreatic tumor  tail pancrease; Hx of CABG; ICD (implantable cardioverter-defibrillator) in place; Essential hypertension; Mixed hyperlipidemia; Chronic pulmonary edema; Acute on chronic combined systolic and diastolic congestive heart failure (HCC); Dilated cardiomyopathy (HCC); and Permanent atrial fibrillation (HCC) on their pertinent problem list.    PLANS:   Patient is medically complex.  Patient was found incidentally to have gallstones as well as pericholecystic fluid on the CT scan that he had done after he presented for trauma after falling.  Patient has no clinical symptoms to suggest acute cholecystitis.  He does not have a white count in the ER.  Patient is having swelling of bilateral lower extremities, this is likely the same etiology causing the swelling around his gallbladder.  I do have concerns that he may be having decreased cardiac function and may benefit a workup.  I discussed with the patient that I would   recommend he be evaluated by his PCP and his cardiologist.  His he says he is fatigued and his swelling in his legs are worse.  In regards to his gallbladder, I feel these are likely asymptomatic stones which can be seen in about 80% of the population that has gallstones and that the swelling around his gallbladder is secondary to his underlying cardiac disease.  No surgical intervention warranted at this time for his gallstones.  Patient can see me back as needed    SUBJECTIVE:   CC: I

## 2024-06-28 ENCOUNTER — CARE COORDINATION (OUTPATIENT)
Dept: CARE COORDINATION | Age: 59
End: 2024-06-28

## 2024-06-28 ENCOUNTER — TELEPHONE (OUTPATIENT)
Dept: FAMILY MEDICINE CLINIC | Age: 59
End: 2024-06-28

## 2024-06-28 NOTE — TELEPHONE ENCOUNTER
Called [facility] and left a message informing surgery scheduling.     Called patient and informed. He was very irritated. Informed patient that I was just wanting to let him know that we are reaching out to cardiology for his surgery clearance. Informed patient I didn't want [facility] to call him and him not be aware.     Patient verbalized understanding however he stated that he is off work he can't wait another month to have surgery. Informed patient that we weren't canceling surgery that we just need to talk with Dr. Dempsey.  Patient was still very irritated but he verbalized understanding.     Informed patient that we will give him a call on Monday once we heard back from Dr. Dempsey office.       Called Dr. Dempsey office and left a message on the nurse line asking for Dr. Dempsey to review patient chart.

## 2024-06-28 NOTE — CARE COORDINATION
Ambulatory Care Coordination Note     2024 1:45 PM     Patient Current Location:  Home:  [facility] [id number]     ACM contacted the patient by telephone. Verified name and  with patient as identifiers.         ACM: Kylah Ramesh RN     Challenges to be reviewed by the provider   Additional needs identified to be addressed with provider No  none               Method of communication with provider: none.    Care Summary Note: Garrett was referred to care coordination for education and assistance in managing his chronic conditions. Pt has h/o: HLD, HTN, a-fib, alcohol abuse, CHF, back pain.   Spoke with Garrett today.   Pt very frustrated.  States that his ortho surgery was canceled.  Pt is aware that he is going to need to be re-cleared by cardiology for surgery d/t recent ble edema.    Pt shared that swelling in ble is better but still has some edema in rle.  Encouraged elevation.  Pt had noticed SOB if trying to lie down flat but states that has improved as well.    Pt is taking his Bumex.  Discussed low sodium diet.  Pt admits that this is challenging at times but states that he is not adding salt to any of his foods.    Encouraged pt to avoid pop and alcohol.    Pt verbalizes understanding but voices his frustrating and states that it makes him angry b/c of all these things are lifestyle changes that he has to make and he can't do it overnight.  Active listening provided.   Pt has scales. Encouraged to weigh as he has not been doing so.    Pt waiting to hear from cardiology office with recommendations.   Last hgb was 7.1  Pt did have evaluation with general surgery for cholecystitis.  Pt is not having any abd/GI s/s.   DM: pt checking BS's after meals.  I encouraged pt to check before a meal and explained to him why.  Pt will try to start checking before meals. Pt reports post meal BS's have been 175-200    Offered patient enrollment in the Remote Patient Monitoring (RPM) program for in-home

## 2024-06-28 NOTE — TELEPHONE ENCOUNTER
Dr. Saucedo office called and stated that they were wanting to check on patient clearance for surgery.     Vianca stated that patient was cleared for surgery and patient ended up in the hospital. She stated that patient just saw general surgery and they didn't clear him to have surgery.   She just wanted to double check with you if you would still clear patient for surgery.     We can call surgery scheduling back at [phone] ext [phone]. Vianca will be on vacation next week she stated that Margi will be working with Dr. Saucedo next week.

## 2024-06-28 NOTE — TELEPHONE ENCOUNTER
Received a phone message from Queta at Dr Adorno office and Janel at Dr Waller's ofc stating patient will need re-cleared for surgery.     Dr Dempsey, please review notes below from Queta and Dr Adorno.     Do you want to see patient?  If so where?  I don't see anything soon.

## 2024-07-01 NOTE — TELEPHONE ENCOUNTER
Called and Left a message with [facility] scheduling informed patient is cleared for surgery spoke with Dr. Dempsey office and patient is cleared.     Called patient and informed. He verbalized understanding.

## 2024-07-03 ENCOUNTER — CARE COORDINATION (OUTPATIENT)
Dept: CARE COORDINATION | Age: 59
End: 2024-07-03

## 2024-07-03 NOTE — CARE COORDINATION
Ambulatory Care Coordination Note     7/3/2024 1:52 PM     Patient Current Location:  [place]     ACM contacted the patient by telephone. Verified name and  with patient as identifiers.         ACM: Kylah Ramesh RN     Challenges to be reviewed by the provider   Additional needs identified to be addressed with provider No  none               Method of communication with provider: none.    Care Summary Note: Garrett was referred to care coordination for education and assistance in managing his chronic conditions. Pt has h/o: HLD, HTN, a-fib, alcohol abuse, CHF, back pain.   Spoke with Garrett today.   Ortho surgery has been rescheduled for .  Pt continues to have significant back pain and weakness.        Offered patient enrollment in the Remote Patient Monitoring (RPM) program for in-home monitoring: Yes, but did not enroll at this time: declines.  Pt having surgery  .  Pt reports that he still has pre procedure instructions.  Advised to follow them and to call with any questions.   spoke with [facility] coumadin clinic and informed them that pt's ortho surgery has been rescheduled and is now going to be on .  They will complete new lovenox bridging sheet and will call pt to  once completed.  I informed pt of this info and he verbalized understanding.    CHF: pt still not monitoring daily wts.  Encouraged to do so. Reports edema is improved.  Has compression stockings that he wears.  Encouraged elevation and low sodium diet.  Following with cardiology.  Denies SOB.   DM: continues to monitor BS's after meal instead of before. Has not checked BS yet today.  Encouraged to so and again reinforced importance of checking before meals.    Assessments Completed:   Diabetes Assessment    Medic Alert ID: No  How often do you test your blood sugar?: Meals   Do you have barriers with adherence to non-pharmacologic self-management interventions? (Nutrition/Exercise/Self-Monitoring): No   Have you ever had to go to

## 2024-07-08 RX ORDER — DAPAGLIFLOZIN 5 MG/1
5 TABLET, FILM COATED ORAL EVERY MORNING
Qty: 90 TABLET | Refills: 3 | OUTPATIENT
Start: 2024-07-08

## 2024-07-11 ENCOUNTER — CARE COORDINATION (OUTPATIENT)
Dept: CARE COORDINATION | Age: 59
End: 2024-07-11

## 2024-07-11 SDOH — HEALTH STABILITY: PHYSICAL HEALTH: ON AVERAGE, HOW MANY DAYS PER WEEK DO YOU ENGAGE IN MODERATE TO STRENUOUS EXERCISE (LIKE A BRISK WALK)?: 0 DAYS

## 2024-07-11 SDOH — ECONOMIC STABILITY: FOOD INSECURITY: WITHIN THE PAST 12 MONTHS, THE FOOD YOU BOUGHT JUST DIDN'T LAST AND YOU DIDN'T HAVE MONEY TO GET MORE.: NEVER TRUE

## 2024-07-11 SDOH — SOCIAL STABILITY: SOCIAL NETWORK: ARE YOU MARRIED, WIDOWED, DIVORCED, SEPARATED, NEVER MARRIED, OR LIVING WITH A PARTNER?: DIVORCED

## 2024-07-11 SDOH — SOCIAL STABILITY: SOCIAL NETWORK
DO YOU BELONG TO ANY CLUBS OR ORGANIZATIONS SUCH AS CHURCH GROUPS UNIONS, FRATERNAL OR ATHLETIC GROUPS, OR SCHOOL GROUPS?: NO

## 2024-07-11 SDOH — ECONOMIC STABILITY: FOOD INSECURITY: WITHIN THE PAST 12 MONTHS, YOU WORRIED THAT YOUR FOOD WOULD RUN OUT BEFORE YOU GOT MONEY TO BUY MORE.: NEVER TRUE

## 2024-07-11 SDOH — HEALTH STABILITY: MENTAL HEALTH
STRESS IS WHEN SOMEONE FEELS TENSE, NERVOUS, ANXIOUS, OR CAN'T SLEEP AT NIGHT BECAUSE THEIR MIND IS TROUBLED. HOW STRESSED ARE YOU?: RATHER MUCH

## 2024-07-11 SDOH — ECONOMIC STABILITY: INCOME INSECURITY: HOW HARD IS IT FOR YOU TO PAY FOR THE VERY BASICS LIKE FOOD, HOUSING, MEDICAL CARE, AND HEATING?: NOT HARD AT ALL

## 2024-07-11 SDOH — SOCIAL STABILITY: SOCIAL NETWORK: HOW OFTEN DO YOU GET TOGETHER WITH FRIENDS OR RELATIVES?: MORE THAN THREE TIMES A WEEK

## 2024-07-11 SDOH — ECONOMIC STABILITY: INCOME INSECURITY: IN THE LAST 12 MONTHS, WAS THERE A TIME WHEN YOU WERE NOT ABLE TO PAY THE MORTGAGE OR RENT ON TIME?: NO

## 2024-07-11 SDOH — HEALTH STABILITY: PHYSICAL HEALTH: ON AVERAGE, HOW MANY MINUTES DO YOU ENGAGE IN EXERCISE AT THIS LEVEL?: 0 MIN

## 2024-07-11 SDOH — SOCIAL STABILITY: SOCIAL NETWORK: HOW OFTEN DO YOU ATTENT MEETINGS OF THE CLUB OR ORGANIZATION YOU BELONG TO?: NEVER

## 2024-07-11 SDOH — ECONOMIC STABILITY: HOUSING INSECURITY: IN THE LAST 12 MONTHS, HOW MANY PLACES HAVE YOU LIVED?: 1

## 2024-07-11 SDOH — SOCIAL STABILITY: SOCIAL NETWORK
IN A TYPICAL WEEK, HOW MANY TIMES DO YOU TALK ON THE PHONE WITH FAMILY, FRIENDS, OR NEIGHBORS?: MORE THAN THREE TIMES A WEEK

## 2024-07-11 NOTE — CARE COORDINATION
1:45 PM Fany Guzmán MD Cardiology [phone]            Follow Up:   Plan for next ACM outreach in approximately 1 week to complete:  - CC Protocol assessments  - disease specific assessments  - goal progression  - education .   Patient  is agreeable to this plan.

## 2024-07-15 ENCOUNTER — TELEPHONE (OUTPATIENT)
Dept: FAMILY MEDICINE CLINIC | Age: 59
End: 2024-07-15

## 2024-07-15 NOTE — TELEPHONE ENCOUNTER
Daughter Jesica on HIPAA called office from ext [phone] stating pt is scheduled for surgery tomorrow with [facility] Dr. Rae. She has many concerns with pt history and post surgical treatment. She met up with him recently on her birthday for lunch and \"he was nodding off\" during lunch. He asked her if \"the Tramadol\" could make him do that. She thinks [facility] prescribed that for him. She is worried what will be prescribed for him after surgery. He has also not been keeping track of his blood sugars or blood pressures as he should. Jesica will reach out to [facility] regarding these concerns to get this addressed prior to pt scheduled surgery.

## 2024-07-15 NOTE — PROGRESS NOTES
Informed Dr Rankin and Dr Mims from Anesthesia about patient High risk and  hct 7.1  Hct 25.1 level of platelets 170   Dr Rankin and Dr mims with Dr Waller was discussing this case.  Plan is for pt to come in and redo labs in am.  Make sure pt is aware of high risk for surgery.

## 2024-07-16 ENCOUNTER — HOSPITAL ENCOUNTER (OUTPATIENT)
Age: 59
Setting detail: OBSERVATION
Discharge: HOME | End: 2024-07-16
Attending: ORTHOPAEDIC SURGERY | Admitting: ORTHOPAEDIC SURGERY
Payer: COMMERCIAL

## 2024-07-16 ENCOUNTER — TELEPHONE (OUTPATIENT)
Dept: FAMILY MEDICINE CLINIC | Age: 59
End: 2024-07-16

## 2024-07-16 ENCOUNTER — PROCEDURE VISIT (OUTPATIENT)
Dept: CARDIOLOGY CLINIC | Age: 59
End: 2024-07-16
Payer: COMMERCIAL

## 2024-07-16 VITALS
HEIGHT: 72 IN | WEIGHT: 277 LBS | BODY MASS INDEX: 37.52 KG/M2 | HEART RATE: 59 BPM | SYSTOLIC BLOOD PRESSURE: 142 MMHG | RESPIRATION RATE: 18 BRPM | DIASTOLIC BLOOD PRESSURE: 68 MMHG | OXYGEN SATURATION: 99 %

## 2024-07-16 DIAGNOSIS — Z95.810 PRESENCE OF COMBINATION INTERNAL CARDIAC DEFIBRILLATOR (ICD) AND PACEMAKER: Primary | ICD-10-CM

## 2024-07-16 DIAGNOSIS — D63.8 ANEMIA, CHRONIC DISEASE: Primary | ICD-10-CM

## 2024-07-16 DIAGNOSIS — Z79.01 ANTICOAGULATED ON COUMADIN: ICD-10-CM

## 2024-07-16 DIAGNOSIS — N18.4 CKD (CHRONIC KIDNEY DISEASE) STAGE 4, GFR 15-29 ML/MIN (HCC): ICD-10-CM

## 2024-07-16 PROBLEM — M48.062 LUMBAR STENOSIS WITH NEUROGENIC CLAUDICATION: Status: ACTIVE | Noted: 2024-07-16

## 2024-07-16 LAB
ABO: NORMAL
ANTIBODY SCREEN: NORMAL
HCT VFR BLD AUTO: 23 % (ref 42–52)
HGB BLD-MCNC: 6.3 GM/DL (ref 14–18)
INR PPP: 1.28 (ref 0.85–1.13)
POTASSIUM SERPL-SCNC: 4.1 MEQ/L (ref 3.5–5.2)
RH FACTOR: NORMAL

## 2024-07-16 PROCEDURE — 85014 HEMATOCRIT: CPT

## 2024-07-16 PROCEDURE — 86901 BLOOD TYPING SEROLOGIC RH(D): CPT

## 2024-07-16 PROCEDURE — 93296 REM INTERROG EVL PM/IDS: CPT | Performed by: NUCLEAR MEDICINE

## 2024-07-16 PROCEDURE — 85018 HEMOGLOBIN: CPT

## 2024-07-16 PROCEDURE — 84132 ASSAY OF SERUM POTASSIUM: CPT

## 2024-07-16 PROCEDURE — 36415 COLL VENOUS BLD VENIPUNCTURE: CPT

## 2024-07-16 PROCEDURE — 93295 DEV INTERROG REMOTE 1/2/MLT: CPT | Performed by: NUCLEAR MEDICINE

## 2024-07-16 PROCEDURE — 86923 COMPATIBILITY TEST ELECTRIC: CPT

## 2024-07-16 PROCEDURE — 85610 PROTHROMBIN TIME: CPT

## 2024-07-16 PROCEDURE — 86850 RBC ANTIBODY SCREEN: CPT

## 2024-07-16 PROCEDURE — 86900 BLOOD TYPING SEROLOGIC ABO: CPT

## 2024-07-16 RX ORDER — SODIUM CHLORIDE 9 MG/ML
INJECTION, SOLUTION INTRAVENOUS PRN
Status: DISCONTINUED | OUTPATIENT
Start: 2024-07-16 | End: 2024-07-16 | Stop reason: HOSPADM

## 2024-07-16 RX ORDER — SODIUM CHLORIDE 0.9 % (FLUSH) 0.9 %
5-40 SYRINGE (ML) INJECTION EVERY 12 HOURS SCHEDULED
Status: DISCONTINUED | OUTPATIENT
Start: 2024-07-16 | End: 2024-07-16 | Stop reason: HOSPADM

## 2024-07-16 RX ORDER — AMLODIPINE BESYLATE 5 MG/1
5 TABLET ORAL 2 TIMES DAILY
COMMUNITY
End: 2024-07-16

## 2024-07-16 RX ORDER — SODIUM CHLORIDE 0.9 % (FLUSH) 0.9 %
5-40 SYRINGE (ML) INJECTION PRN
Status: DISCONTINUED | OUTPATIENT
Start: 2024-07-16 | End: 2024-07-16 | Stop reason: HOSPADM

## 2024-07-16 ASSESSMENT — PAIN - FUNCTIONAL ASSESSMENT
PAIN_FUNCTIONAL_ASSESSMENT: PREVENTS OR INTERFERES WITH MANY ACTIVE NOT PASSIVE ACTIVITIES
PAIN_FUNCTIONAL_ASSESSMENT: 0-10

## 2024-07-16 ASSESSMENT — PAIN DESCRIPTION - DESCRIPTORS: DESCRIPTORS: ACHING;SHARP

## 2024-07-16 NOTE — TELEPHONE ENCOUNTER
No he should not be on it.  Hydralyzine 50 mg TID,  Imdur 120 mg, Metoprolol 100 XL mg BID and Bumex 2 mg Daily are his BP medications.

## 2024-07-16 NOTE — TELEPHONE ENCOUNTER
Pt was notified and voiced understanding. He is aware Hematology will contact him to schedule appt. Pt will stop the Amlodipine, this med has been flagged for removal on his medication list.

## 2024-07-16 NOTE — TELEPHONE ENCOUNTER
Nurse Sandoval with [facility] ext [phone] called office to let us know pt's surgery had been canceled this am due to low HGB at 6.3. She says pt was asking about pain meds and Dr. Waller said he would not give pt any because he does not manage his pain. The nurse advised pt to take his Norco, pt said he did not have any left. LISA Sandoval also mentions pt brought a bottle of Amlodipine with him this morning and says he takes it daily. Nurse says the bottle was dated 2022, but pt says he takes it. Amlodipine was not on pt med list until this am when the nurse added it on. She just wants you aware.

## 2024-07-16 NOTE — TELEPHONE ENCOUNTER
Jesica [facility] called office stating the surgery pt was scheduled for today has been canceled due to Hgb of 6.3. They advised pt to call his PCP for a Hematology referral. Call Jesica back. Please advise.

## 2024-07-16 NOTE — H&P
HCT 25.1 06/26/2024 12:35 PM     CMP:    Lab Results   Component Value Date/Time     06/20/2024 06:37 PM    K 3.9 06/20/2024 06:37 PM    K 4.5 04/26/2024 06:03 AM     06/20/2024 06:37 PM    CO2 20 06/20/2024 06:37 PM    BUN 47 06/20/2024 06:37 PM    CREATININE 3.9 06/20/2024 06:37 PM    LABGLOM 17 06/20/2024 06:37 PM    LABGLOM 17 04/29/2024 10:20 AM    GLUCOSE 279 06/20/2024 06:37 PM    CALCIUM 8.1 06/20/2024 06:37 PM    BILITOT 0.4 04/25/2024 03:48 PM    ALKPHOS 96 04/25/2024 03:48 PM    AST 12 04/25/2024 03:48 PM    ALT 10 04/25/2024 03:48 PM       Radiology:  MRI Lumbar Spine dated 8/7/2023  IMPRESSION:     1. Degenerative changes resulting in mild-to-moderate canal and moderate severe bilateral foraminal stenosis at L1-2.  2. There is mild canal and moderate severe bilateral foraminal stenosis at L3-4.  3. There is mild canal and moderate bilateral foraminal stenosis at L4-5.  4. There is mild canal stenosis and mild-to-moderate bilateral foraminal stenosis at T12-L1.  5. There is mild canal, moderate severe right and moderate left-sided foraminal stenosis at L2-3.  6. There is spondylolysis on the right side at L5.  7. There is a small intradural defect behind L5-S1 larger than on remote study dated 4/10/2018. This may represent an intradural schwannoma.    IMPRESSION/RECOMMENDATIONS:    Assessment:   1) L1-5 degenerative disc disease with stenosis and neurogenic claudication  2) Prior lumbar decompression  3) Right L5 spondylolysis    Plan:  1) L1-5 decompression and fusion    HUGH Medrano

## 2024-07-16 NOTE — PROGRESS NOTES
[facility] medtronic dual icd     ..Battery longevity:  7 months   Presenting rhythm  ASVS   Optivol WNl    Atrial impedance 342  RV impedance 304  Shock 51    P wave sensing 0.9  R wave sensing 6.9    1.9 % atrial paced  0.1 % RV paced     Atrial threshold 0.625 V  at 0.4ms  RV threshold 0.625 V at 0.4ms  Mode switches   0

## 2024-07-16 NOTE — TELEPHONE ENCOUNTER
OP surgery and the patients daughter are questioning if the patient is supposed to be taking amlodipine 5mg.  The bottle he brought with him to surgery was dated 2022.  Please advise.

## 2024-07-17 ENCOUNTER — CARE COORDINATION (OUTPATIENT)
Dept: CARE COORDINATION | Age: 59
End: 2024-07-17

## 2024-07-17 NOTE — CARE COORDINATION
Back surgery canceled yesterday.  Pt has f/u with hematology tomorrow.    Contacted [facility] coumadin clinic as pt was being bridged with Lovenox prior.  They will f/u with pt.

## 2024-07-17 NOTE — TELEPHONE ENCOUNTER
Daughter Jesica called office. She was notified of referral and that office will contact pt to schedule. She is also aware pt is not to be on the Amlodipine.

## 2024-07-18 ENCOUNTER — HOSPITAL ENCOUNTER (INPATIENT)
Age: 59
LOS: 3 days | Discharge: HOME | DRG: 812 | End: 2024-07-21
Attending: PHYSICIAN ASSISTANT
Payer: COMMERCIAL

## 2024-07-18 ENCOUNTER — OFFICE VISIT (OUTPATIENT)
Dept: ONCOLOGY | Age: 59
End: 2024-07-18
Payer: COMMERCIAL

## 2024-07-18 ENCOUNTER — HOSPITAL ENCOUNTER (OUTPATIENT)
Dept: INFUSION THERAPY | Age: 59
Discharge: HOME | End: 2024-07-18

## 2024-07-18 ENCOUNTER — APPOINTMENT (OUTPATIENT)
Dept: GENERAL RADIOLOGY | Age: 59
DRG: 812 | End: 2024-07-18
Payer: COMMERCIAL

## 2024-07-18 VITALS
WEIGHT: 275 LBS | DIASTOLIC BLOOD PRESSURE: 61 MMHG | OXYGEN SATURATION: 97 % | BODY MASS INDEX: 37.25 KG/M2 | SYSTOLIC BLOOD PRESSURE: 132 MMHG | HEART RATE: 64 BPM | HEIGHT: 72 IN | RESPIRATION RATE: 18 BRPM | TEMPERATURE: 98 F

## 2024-07-18 DIAGNOSIS — D64.9 SYMPTOMATIC ANEMIA: ICD-10-CM

## 2024-07-18 DIAGNOSIS — M48.062 LUMBAR STENOSIS WITH NEUROGENIC CLAUDICATION: ICD-10-CM

## 2024-07-18 DIAGNOSIS — N18.32 STAGE 3B CHRONIC KIDNEY DISEASE (CKD) (HCC): ICD-10-CM

## 2024-07-18 DIAGNOSIS — R53.81 MALAISE AND FATIGUE: ICD-10-CM

## 2024-07-18 DIAGNOSIS — D64.9 NORMOCYTIC ANEMIA: Primary | ICD-10-CM

## 2024-07-18 DIAGNOSIS — R53.83 MALAISE AND FATIGUE: ICD-10-CM

## 2024-07-18 DIAGNOSIS — D64.9 ANEMIA, UNSPECIFIED TYPE: Primary | ICD-10-CM

## 2024-07-18 LAB
ABO: NORMAL
ALBUMIN SERPL BCG-MCNC: 3.6 G/DL (ref 3.5–5.1)
ALP SERPL-CCNC: 86 U/L (ref 38–126)
ALT SERPL W/O P-5'-P-CCNC: 13 U/L (ref 11–66)
ANION GAP SERPL CALC-SCNC: 10 MEQ/L (ref 8–16)
ANTIBODY SCREEN: NORMAL
AST SERPL-CCNC: 17 U/L (ref 5–40)
BASOPHILS ABSOLUTE: 0 THOU/MM3 (ref 0–0.1)
BASOPHILS NFR BLD AUTO: 0.8 %
BILIRUB CONJ SERPL-MCNC: 0.2 MG/DL (ref 0.1–13.8)
BILIRUB SERPL-MCNC: 0.3 MG/DL (ref 0.3–1.2)
BUN SERPL-MCNC: 41 MG/DL (ref 7–22)
CALCIUM SERPL-MCNC: 8.4 MG/DL (ref 8.5–10.5)
CHLORIDE SERPL-SCNC: 107 MEQ/L (ref 98–111)
CO2 SERPL-SCNC: 21 MEQ/L (ref 23–33)
CREAT SERPL-MCNC: 3.9 MG/DL (ref 0.4–1.2)
DEPRECATED RDW RBC AUTO: 55.7 FL (ref 35–45)
EKG ATRIAL RATE: 56 BPM
EKG P AXIS: 50 DEGREES
EKG P-R INTERVAL: 206 MS
EKG Q-T INTERVAL: 440 MS
EKG QRS DURATION: 90 MS
EKG QTC CALCULATION (BAZETT): 424 MS
EKG R AXIS: -2 DEGREES
EKG T AXIS: 103 DEGREES
EKG VENTRICULAR RATE: 56 BPM
ELLIPTOCYTES: ABNORMAL
EOSINOPHIL NFR BLD AUTO: 2.3 %
EOSINOPHILS ABSOLUTE: 0.1 THOU/MM3 (ref 0–0.4)
ERYTHROCYTE [DISTWIDTH] IN BLOOD BY AUTOMATED COUNT: 17.5 % (ref 11.5–14.5)
FERRITIN SERPL IA-MCNC: 27 NG/ML (ref 22–322)
FOLATE SERPL-MCNC: > 20 NG/ML (ref 4.8–24.2)
GFR SERPL CREATININE-BSD FRML MDRD: 17 ML/MIN/1.73M2
GLUCOSE BLD STRIP.AUTO-MCNC: 206 MG/DL (ref 70–108)
GLUCOSE SERPL-MCNC: 54 MG/DL (ref 70–108)
HCT VFR BLD AUTO: 22.7 % (ref 42–52)
HCT VFR BLD AUTO: 25.9 % (ref 42–52)
HCT VFR BLD AUTO: 26.4 % (ref 42–52)
HEMOCCULT STL QL: NEGATIVE
HGB BLD-MCNC: 6.5 GM/DL (ref 14–18)
HGB BLD-MCNC: 7.3 GM/DL (ref 14–18)
HGB BLD-MCNC: 7.4 GM/DL (ref 14–18)
HGB RETIC QN AUTO: 20.5 PG (ref 28.2–35.7)
HYPOCHROMIA BLD QL SMEAR: PRESENT
IMM GRANULOCYTES # BLD AUTO: 0.03 THOU/MM3 (ref 0–0.07)
IMM GRANULOCYTES NFR BLD AUTO: 0.5 %
IMM RETICS NFR: 31.3 % (ref 2.3–13.4)
INR PPP: 1.09 (ref 0.85–1.13)
IRON SATN MFR SERPL: 7 % (ref 20–50)
IRON SERPL-MCNC: 20 UG/DL (ref 65–195)
LDH SERPL L TO P-CCNC: 209 U/L (ref 100–190)
LIPASE SERPL-CCNC: 52.1 U/L (ref 5.6–51.3)
LYMPHOCYTES ABSOLUTE: 0.4 THOU/MM3 (ref 1–4.8)
LYMPHOCYTES NFR BLD AUTO: 6 %
MCH RBC QN AUTO: 25.1 PG (ref 26–33)
MCHC RBC AUTO-ENTMCNC: 28.6 GM/DL (ref 32.2–35.5)
MCV RBC AUTO: 87.6 FL (ref 80–94)
MONOCYTES ABSOLUTE: 0.7 THOU/MM3 (ref 0.4–1.3)
MONOCYTES NFR BLD AUTO: 11.8 %
NEUTROPHILS ABSOLUTE: 4.9 THOU/MM3 (ref 1.8–7.7)
NEUTROPHILS NFR BLD AUTO: 78.6 %
NRBC BLD AUTO-RTO: 0 /100 WBC
NT-PROBNP SERPL IA-MCNC: ABNORMAL PG/ML (ref 0–124)
OSMOLALITY SERPL CALC.SUM OF ELEC: 283.3 MOSMOL/KG (ref 275–300)
PLATELET # BLD AUTO: 135 THOU/MM3 (ref 130–400)
PLATELET BLD QL SMEAR: ADEQUATE
PMV BLD AUTO: 10.3 FL (ref 9.4–12.4)
POLYCHROMASIA BLD QL SMEAR: ABNORMAL
POTASSIUM SERPL-SCNC: 3.9 MEQ/L (ref 3.5–5.2)
PROT SERPL-MCNC: 5.9 G/DL (ref 6.1–8)
RBC # BLD AUTO: 2.59 MILL/MM3 (ref 4.7–6.1)
RETICS # AUTO: 86 THOU/MM3 (ref 20–115)
RETICS/RBC NFR AUTO: 3.2 % (ref 0.5–2)
RH FACTOR: NORMAL
SCAN OF BLOOD SMEAR: NORMAL
SODIUM SERPL-SCNC: 138 MEQ/L (ref 135–145)
TARGETS BLD QL SMEAR: ABNORMAL
TIBC SERPL-MCNC: 302 UG/DL (ref 171–450)
TROPONIN, HIGH SENSITIVITY: 174 NG/L (ref 0–12)
VIT B12 SERPL-MCNC: 361 PG/ML (ref 211–911)
WBC # BLD AUTO: 6.2 THOU/MM3 (ref 4.8–10.8)

## 2024-07-18 PROCEDURE — 85014 HEMATOCRIT: CPT

## 2024-07-18 PROCEDURE — 85025 COMPLETE CBC W/AUTO DIFF WBC: CPT

## 2024-07-18 PROCEDURE — 80048 BASIC METABOLIC PNL TOTAL CA: CPT

## 2024-07-18 PROCEDURE — 85046 RETICYTE/HGB CONCENTRATE: CPT

## 2024-07-18 PROCEDURE — 2580000003 HC RX 258: Performed by: PHYSICIAN ASSISTANT

## 2024-07-18 PROCEDURE — 3078F DIAST BP <80 MM HG: CPT | Performed by: NURSE PRACTITIONER

## 2024-07-18 PROCEDURE — 86900 BLOOD TYPING SEROLOGIC ABO: CPT

## 2024-07-18 PROCEDURE — 82948 REAGENT STRIP/BLOOD GLUCOSE: CPT

## 2024-07-18 PROCEDURE — 84484 ASSAY OF TROPONIN QUANT: CPT

## 2024-07-18 PROCEDURE — 6370000000 HC RX 637 (ALT 250 FOR IP): Performed by: PHYSICIAN ASSISTANT

## 2024-07-18 PROCEDURE — 93005 ELECTROCARDIOGRAM TRACING: CPT | Performed by: PHYSICIAN ASSISTANT

## 2024-07-18 PROCEDURE — 83010 ASSAY OF HAPTOGLOBIN QUANT: CPT

## 2024-07-18 PROCEDURE — 86901 BLOOD TYPING SEROLOGIC RH(D): CPT

## 2024-07-18 PROCEDURE — 99223 1ST HOSP IP/OBS HIGH 75: CPT | Performed by: PHYSICIAN ASSISTANT

## 2024-07-18 PROCEDURE — 99205 OFFICE O/P NEW HI 60 MIN: CPT | Performed by: NURSE PRACTITIONER

## 2024-07-18 PROCEDURE — 83540 ASSAY OF IRON: CPT

## 2024-07-18 PROCEDURE — 86850 RBC ANTIBODY SCREEN: CPT

## 2024-07-18 PROCEDURE — 83550 IRON BINDING TEST: CPT

## 2024-07-18 PROCEDURE — 6360000002 HC RX W HCPCS: Performed by: PHYSICIAN ASSISTANT

## 2024-07-18 PROCEDURE — 83690 ASSAY OF LIPASE: CPT

## 2024-07-18 PROCEDURE — P9016 RBC LEUKOCYTES REDUCED: HCPCS

## 2024-07-18 PROCEDURE — 93010 ELECTROCARDIOGRAM REPORT: CPT | Performed by: INTERNAL MEDICINE

## 2024-07-18 PROCEDURE — 85018 HEMOGLOBIN: CPT

## 2024-07-18 PROCEDURE — 1200000003 HC TELEMETRY R&B

## 2024-07-18 PROCEDURE — 3075F SYST BP GE 130 - 139MM HG: CPT | Performed by: NURSE PRACTITIONER

## 2024-07-18 PROCEDURE — 86923 COMPATIBILITY TEST ELECTRIC: CPT

## 2024-07-18 PROCEDURE — 83615 LACTATE (LD) (LDH) ENZYME: CPT

## 2024-07-18 PROCEDURE — 80076 HEPATIC FUNCTION PANEL: CPT

## 2024-07-18 PROCEDURE — 83880 ASSAY OF NATRIURETIC PEPTIDE: CPT

## 2024-07-18 PROCEDURE — 6370000000 HC RX 637 (ALT 250 FOR IP)

## 2024-07-18 PROCEDURE — 82272 OCCULT BLD FECES 1-3 TESTS: CPT

## 2024-07-18 PROCEDURE — 82746 ASSAY OF FOLIC ACID SERUM: CPT

## 2024-07-18 PROCEDURE — 82607 VITAMIN B-12: CPT

## 2024-07-18 PROCEDURE — 99285 EMERGENCY DEPT VISIT HI MDM: CPT

## 2024-07-18 PROCEDURE — 85610 PROTHROMBIN TIME: CPT

## 2024-07-18 PROCEDURE — 71045 X-RAY EXAM CHEST 1 VIEW: CPT

## 2024-07-18 PROCEDURE — 36415 COLL VENOUS BLD VENIPUNCTURE: CPT

## 2024-07-18 PROCEDURE — 82728 ASSAY OF FERRITIN: CPT

## 2024-07-18 RX ORDER — NICOTINE 21 MG/24HR
1 PATCH, TRANSDERMAL 24 HOURS TRANSDERMAL DAILY
Status: DISCONTINUED | OUTPATIENT
Start: 2024-07-18 | End: 2024-07-21 | Stop reason: HOSPADM

## 2024-07-18 RX ORDER — AMIODARONE HYDROCHLORIDE 200 MG/1
200 TABLET ORAL DAILY
Status: DISCONTINUED | OUTPATIENT
Start: 2024-07-19 | End: 2024-07-21 | Stop reason: HOSPADM

## 2024-07-18 RX ORDER — SODIUM CHLORIDE 9 MG/ML
INJECTION, SOLUTION INTRAVENOUS PRN
Status: DISCONTINUED | OUTPATIENT
Start: 2024-07-18 | End: 2024-07-21 | Stop reason: HOSPADM

## 2024-07-18 RX ORDER — SODIUM CHLORIDE 0.9 % (FLUSH) 0.9 %
5-40 SYRINGE (ML) INJECTION EVERY 12 HOURS SCHEDULED
Status: DISCONTINUED | OUTPATIENT
Start: 2024-07-18 | End: 2024-07-21 | Stop reason: HOSPADM

## 2024-07-18 RX ORDER — DEXTROSE MONOHYDRATE 100 MG/ML
INJECTION, SOLUTION INTRAVENOUS CONTINUOUS PRN
Status: DISCONTINUED | OUTPATIENT
Start: 2024-07-18 | End: 2024-07-21 | Stop reason: HOSPADM

## 2024-07-18 RX ORDER — ALPRAZOLAM 0.5 MG/1
0.5 TABLET ORAL NIGHTLY
COMMUNITY

## 2024-07-18 RX ORDER — ISOSORBIDE MONONITRATE 60 MG/1
120 TABLET, EXTENDED RELEASE ORAL DAILY
Status: DISCONTINUED | OUTPATIENT
Start: 2024-07-18 | End: 2024-07-21 | Stop reason: HOSPADM

## 2024-07-18 RX ORDER — FOLIC ACID 1 MG/1
1 TABLET ORAL DAILY
Status: DISCONTINUED | OUTPATIENT
Start: 2024-07-18 | End: 2024-07-21 | Stop reason: HOSPADM

## 2024-07-18 RX ORDER — WARFARIN SODIUM 5 MG/1
5 TABLET ORAL
Status: COMPLETED | OUTPATIENT
Start: 2024-07-18 | End: 2024-07-18

## 2024-07-18 RX ORDER — OXYCODONE HYDROCHLORIDE AND ACETAMINOPHEN 5; 325 MG/1; MG/1
1 TABLET ORAL ONCE
Status: COMPLETED | OUTPATIENT
Start: 2024-07-18 | End: 2024-07-18

## 2024-07-18 RX ORDER — MEXILETINE HYDROCHLORIDE 150 MG/1
300 CAPSULE ORAL EVERY 8 HOURS SCHEDULED
Status: DISCONTINUED | OUTPATIENT
Start: 2024-07-18 | End: 2024-07-21 | Stop reason: HOSPADM

## 2024-07-18 RX ORDER — ACETAMINOPHEN 650 MG/1
650 SUPPOSITORY RECTAL EVERY 6 HOURS PRN
Status: DISCONTINUED | OUTPATIENT
Start: 2024-07-18 | End: 2024-07-21 | Stop reason: HOSPADM

## 2024-07-18 RX ORDER — POLYETHYLENE GLYCOL 3350 17 G/17G
17 POWDER, FOR SOLUTION ORAL DAILY PRN
Status: DISCONTINUED | OUTPATIENT
Start: 2024-07-18 | End: 2024-07-21 | Stop reason: HOSPADM

## 2024-07-18 RX ORDER — BUMETANIDE 0.25 MG/ML
2 INJECTION INTRAMUSCULAR; INTRAVENOUS ONCE
Status: COMPLETED | OUTPATIENT
Start: 2024-07-18 | End: 2024-07-18

## 2024-07-18 RX ORDER — ATORVASTATIN CALCIUM 40 MG/1
40 TABLET, FILM COATED ORAL NIGHTLY
Status: DISCONTINUED | OUTPATIENT
Start: 2024-07-18 | End: 2024-07-21 | Stop reason: HOSPADM

## 2024-07-18 RX ORDER — ONDANSETRON 2 MG/ML
4 INJECTION INTRAMUSCULAR; INTRAVENOUS EVERY 6 HOURS PRN
Status: DISCONTINUED | OUTPATIENT
Start: 2024-07-18 | End: 2024-07-21 | Stop reason: HOSPADM

## 2024-07-18 RX ORDER — ENOXAPARIN SODIUM 150 MG/ML
1 INJECTION SUBCUTANEOUS EVERY 24 HOURS
Status: DISCONTINUED | OUTPATIENT
Start: 2024-07-18 | End: 2024-07-21 | Stop reason: HOSPADM

## 2024-07-18 RX ORDER — LIDOCAINE 4 G/G
1 PATCH TOPICAL DAILY
Status: DISCONTINUED | OUTPATIENT
Start: 2024-07-18 | End: 2024-07-21 | Stop reason: HOSPADM

## 2024-07-18 RX ORDER — DICYCLOMINE HYDROCHLORIDE 10 MG/1
10 CAPSULE ORAL
Status: DISCONTINUED | OUTPATIENT
Start: 2024-07-19 | End: 2024-07-21 | Stop reason: HOSPADM

## 2024-07-18 RX ORDER — FERROUS SULFATE 325(65) MG
325 TABLET ORAL 2 TIMES DAILY WITH MEALS
Status: DISCONTINUED | OUTPATIENT
Start: 2024-07-18 | End: 2024-07-21 | Stop reason: HOSPADM

## 2024-07-18 RX ORDER — INSULIN LISPRO 100 [IU]/ML
0-4 INJECTION, SOLUTION INTRAVENOUS; SUBCUTANEOUS NIGHTLY
Status: DISCONTINUED | OUTPATIENT
Start: 2024-07-18 | End: 2024-07-21 | Stop reason: HOSPADM

## 2024-07-18 RX ORDER — SODIUM CHLORIDE 0.9 % (FLUSH) 0.9 %
5-40 SYRINGE (ML) INJECTION PRN
Status: DISCONTINUED | OUTPATIENT
Start: 2024-07-18 | End: 2024-07-21 | Stop reason: HOSPADM

## 2024-07-18 RX ORDER — HYDROCODONE BITARTRATE AND ACETAMINOPHEN 5; 325 MG/1; MG/1
1 TABLET ORAL EVERY 6 HOURS PRN
Status: DISCONTINUED | OUTPATIENT
Start: 2024-07-18 | End: 2024-07-21 | Stop reason: HOSPADM

## 2024-07-18 RX ORDER — INSULIN LISPRO 100 [IU]/ML
0-4 INJECTION, SOLUTION INTRAVENOUS; SUBCUTANEOUS
Status: DISCONTINUED | OUTPATIENT
Start: 2024-07-18 | End: 2024-07-21 | Stop reason: HOSPADM

## 2024-07-18 RX ORDER — ALPRAZOLAM 0.5 MG/1
0.5 TABLET ORAL NIGHTLY
Status: DISCONTINUED | OUTPATIENT
Start: 2024-07-18 | End: 2024-07-21 | Stop reason: HOSPADM

## 2024-07-18 RX ORDER — BUMETANIDE 1 MG/1
2 TABLET ORAL DAILY
Status: DISCONTINUED | OUTPATIENT
Start: 2024-07-19 | End: 2024-07-21 | Stop reason: HOSPADM

## 2024-07-18 RX ORDER — HYDRALAZINE HYDROCHLORIDE 50 MG/1
50 TABLET, FILM COATED ORAL 3 TIMES DAILY
Status: DISCONTINUED | OUTPATIENT
Start: 2024-07-18 | End: 2024-07-21 | Stop reason: HOSPADM

## 2024-07-18 RX ORDER — HYDROCODONE BITARTRATE AND ACETAMINOPHEN 5; 325 MG/1; MG/1
1 TABLET ORAL ONCE
Status: COMPLETED | OUTPATIENT
Start: 2024-07-18 | End: 2024-07-18

## 2024-07-18 RX ORDER — ACETAMINOPHEN 325 MG/1
650 TABLET ORAL EVERY 6 HOURS PRN
Status: DISCONTINUED | OUTPATIENT
Start: 2024-07-18 | End: 2024-07-21 | Stop reason: HOSPADM

## 2024-07-18 RX ORDER — GLUCAGON 1 MG/ML
1 KIT INJECTION PRN
Status: DISCONTINUED | OUTPATIENT
Start: 2024-07-18 | End: 2024-07-21 | Stop reason: HOSPADM

## 2024-07-18 RX ORDER — METOPROLOL SUCCINATE 100 MG/1
100 TABLET, EXTENDED RELEASE ORAL DAILY
Status: DISCONTINUED | OUTPATIENT
Start: 2024-07-18 | End: 2024-07-21 | Stop reason: HOSPADM

## 2024-07-18 RX ORDER — ONDANSETRON 4 MG/1
4 TABLET, ORALLY DISINTEGRATING ORAL EVERY 8 HOURS PRN
Status: DISCONTINUED | OUTPATIENT
Start: 2024-07-18 | End: 2024-07-21 | Stop reason: HOSPADM

## 2024-07-18 RX ADMIN — FOLIC ACID 1 MG: 1 TABLET ORAL at 21:27

## 2024-07-18 RX ADMIN — ENOXAPARIN SODIUM 120 MG: 150 INJECTION SUBCUTANEOUS at 22:10

## 2024-07-18 RX ADMIN — TICAGRELOR 90 MG: 90 TABLET ORAL at 21:27

## 2024-07-18 RX ADMIN — ISOSORBIDE MONONITRATE 120 MG: 60 TABLET, EXTENDED RELEASE ORAL at 21:27

## 2024-07-18 RX ADMIN — ATORVASTATIN CALCIUM 40 MG: 40 TABLET, FILM COATED ORAL at 21:27

## 2024-07-18 RX ADMIN — ALPRAZOLAM 0.5 MG: 0.5 TABLET ORAL at 21:27

## 2024-07-18 RX ADMIN — HYDROCODONE BITARTRATE AND ACETAMINOPHEN 1 TABLET: 5; 325 TABLET ORAL at 17:53

## 2024-07-18 RX ADMIN — METOPROLOL SUCCINATE 100 MG: 100 TABLET, EXTENDED RELEASE ORAL at 21:27

## 2024-07-18 RX ADMIN — WARFARIN SODIUM 5 MG: 5 TABLET ORAL at 19:04

## 2024-07-18 RX ADMIN — SODIUM CHLORIDE, PRESERVATIVE FREE 10 ML: 5 INJECTION INTRAVENOUS at 21:40

## 2024-07-18 RX ADMIN — BUMETANIDE 2 MG: 0.25 INJECTION INTRAMUSCULAR; INTRAVENOUS at 16:13

## 2024-07-18 RX ADMIN — HYDRALAZINE HYDROCHLORIDE 50 MG: 50 TABLET ORAL at 21:27

## 2024-07-18 RX ADMIN — FERROUS SULFATE TAB 325 MG (65 MG ELEMENTAL FE) 325 MG: 325 (65 FE) TAB at 21:27

## 2024-07-18 RX ADMIN — MEXILETINE HYDROCHLORIDE 300 MG: 150 CAPSULE ORAL at 22:59

## 2024-07-18 RX ADMIN — SODIUM CHLORIDE 125 MG: 9 INJECTION, SOLUTION INTRAVENOUS at 22:10

## 2024-07-18 RX ADMIN — OXYCODONE HYDROCHLORIDE AND ACETAMINOPHEN 1 TABLET: 5; 325 TABLET ORAL at 12:08

## 2024-07-18 RX ADMIN — HYDROCODONE BITARTRATE AND ACETAMINOPHEN 1 TABLET: 5; 325 TABLET ORAL at 21:27

## 2024-07-18 RX ADMIN — ONDANSETRON 4 MG: 4 TABLET, ORALLY DISINTEGRATING ORAL at 19:52

## 2024-07-18 ASSESSMENT — PAIN - FUNCTIONAL ASSESSMENT
PAIN_FUNCTIONAL_ASSESSMENT: NONE - DENIES PAIN
PAIN_FUNCTIONAL_ASSESSMENT: 0-10

## 2024-07-18 ASSESSMENT — PAIN SCALES - GENERAL
PAINLEVEL_OUTOF10: 5
PAINLEVEL_OUTOF10: 9
PAINLEVEL_OUTOF10: 10
PAINLEVEL_OUTOF10: 6
PAINLEVEL_OUTOF10: 9

## 2024-07-18 ASSESSMENT — PAIN DESCRIPTION - LOCATION: LOCATION: BACK;LEG

## 2024-07-18 NOTE — PROGRESS NOTES
Pharmacy Medication History Note      List of current medications patient is taking is complete.    Source of information: Patient, recent fill history    Changes made to medication list:  Medications removed (include reason, ex. therapy complete or physician discontinued):  Norco - not taking    Medications added/doses adjusted:  Added alprazolam 0.5 mg nightly  Changed lantus from 10 units to 5 units Nightly PRN  Changed warfarin from 2.5 mg on Fri and 5 mg all other days to 2.5 mg Mon and Fri and 5 mg all other days.    Other notes (ex. Recent course of antibiotics, Coumadin dosing):  Patient's warfarin managed at [facility].  Patient was bridging with lovenox after surgery but last dose was last night.  Denies use of other OTC or herbal medications.      Allergies reviewed      Electronically signed by Queta Almanzar RPH on 7/18/2024 at 3:20 PM

## 2024-07-18 NOTE — ED NOTES
ED to inpatient nurses report      Chief Complaint:  Chief Complaint   Patient presents with    Abnormal Lab     Present to ED from: home    MOA:     LOC: alert and orientated to name, place, date  Mobility: Independent  Oxygen Baseline: room air     Current needs required: room air      Code Status:   Prior    What abnormal results were found and what did you give/do to treat them?  Low hemoglobin>Blood transfusion  Any procedures or intervention occur?     Mental Status:  Level of Consciousness: Alert (0)    Psych Assessment:        Vitals:  Patient Vitals for the past 24 hrs:   BP Temp Temp src Pulse Resp SpO2 Height Weight   07/18/24 1419 (!) 150/69 97.4 °F (36.3 °C) Oral 52 20 95 % -- --   07/18/24 1348 (!) 141/79 -- -- 57 17 98 % -- --   07/18/24 1249 137/73 -- -- 53 17 96 % -- --   07/18/24 1207 (!) 150/76 -- -- 59 22 100 % -- --   07/18/24 1114 (!) 148/79 -- -- 56 18 100 % -- --   07/18/24 1018 (!) 140/71 98 °F (36.7 °C) Oral 60 18 97 % 1.829 m (6') 124.7 kg (275 lb)        LDAs:   Peripheral IV 07/18/24 Distal;Left;Anterior Cephalic (Active)   Site Assessment Clean, dry & intact 07/18/24 1024   Line Status Blood return noted;Flushed;Specimen collected 07/18/24 1024   Dressing Status Clean, dry & intact 07/18/24 1024       Ambulatory Status:  No data recorded    Diagnosis:  DISPOSITION Admitted 07/18/2024 02:26:20 PM   Final diagnoses:   Anemia, unspecified type   Malaise and fatigue        Consults:  None     Pain Score:  Pain Assessment  Pain Assessment: 0-10  Pain Level: 9  Patient's Stated Pain Goal: 5  Pain Location: Back, Leg    C-SSRS:   Risk of Suicide: No Risk    Sepsis Screening:       Melissa Fall Risk:       Swallow Screening        Preferred Language:   English      ALLERGIES     Latex, Ativan [lorazepam], Gabapentin, Lidocaine, Pcn [penicillins], and Zoloft [sertraline hcl]    SURGICAL HISTORY       Past Surgical History:   Procedure Laterality Date    CARDIAC CATHETERIZATION  03/20/2014    [facility]     CARDIAC DEFIBRILLATOR PLACEMENT  10/13/2015    MEDTRONIC [place], MRI CONDITIONAL ICD    CARDIAC ELECTROPHYSIOLOGY STUDY AND ABLATION      CORONARY ARTERY BYPASS GRAFT  05/09/2014    3 bypass    EKG 12-LEAD  07/17/2015         OTHER SURGICAL HISTORY Left 10/21/2014    Left Bursectomy, I & D Left Elbow - Dr. 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     Intradural extramedullary spinal tumor     Long term (current) use of anticoagulants 08/29/2019    Medtronic dual icd      Neuromuscular disorder (HCC)     Paroxysmal atrial fibrillation (HCC) 07/22/2014    Prolonged emergence from general anesthesia     from CABG surgery    Severe obesity (BMI 35.0-39.9) with comorbidity (HCC) 05/22/2023    V-tach ([facility])     s/p ablation in dec 2014           Electronically signed by Santosh Perez RN on 7/18/2024 at 2:30 PM

## 2024-07-18 NOTE — ED TRIAGE NOTES
Pt presents to the ED from the hematology office with a need for a blood transfusion. Pt states he was supposed to have back surgery for his spinal stenosis on Tuesday however his hemoglobin was low. Pt went to the hematologists office and they sent himi here to receive blood. Pt states this is the first time he has had low hemoglobin. Pt mentions that he has been short of breath for a few days as well. EKG completed.

## 2024-07-18 NOTE — ED NOTES
This RN was with patient for the first 15 minutes of the blood transfusion. VSS, pt is aferbile. Pt denies feeling like he is having any sort of adverse reaction. Pt denies any chest pain, shortness of breath, lower back pain. Pt denies any itchiness. Lung sounds are clear A/P.

## 2024-07-18 NOTE — PROGRESS NOTES
Warfarin Pharmacy Consult Note    [place] is a 59 y.o. male for whom pharmacy has been asked to manage warfarin therapy.     Consulting Provider: Ryan Garcia PA-C   Warfarin Indication: Atrial fibrillation  Target INR range: 2.0-3.0   Warfarin dose prior to admission: 2.5 mg on Monday and Friday, 5 mg all other days of the week  Outpatient warfarin provider: Followed by [facility]    Recent Labs     07/16/24  0555 07/18/24  1529   INR 1.28* 1.09     Recent Labs     07/16/24  0555 07/18/24  1027   HGB 6.3* 6.5*   PLT  --  135     Concurrent anticoagulants/antiplatelets: Patient being bridged with Lovenox PTA 2/2 procedure on 7/16/24. AC resumed 7/17/24 per patient. Brilinta (home med)  Significant warfarin drug-drug interactions: amiodarone (home med)    Date INR Warfarin Dose   7/18 1.09 5 mg                                   INR will be monitored routinely until therapeutic INR is achieved.    Bridging with full-dose enoxaparin as INR is subtherapeutic.     Pharmacy will continue to follow. Thank you for the consult,     Marely Hnut, PharmD  7/18/2024 4:24 PM

## 2024-07-18 NOTE — ED NOTES
Called [facility] and spoke with Gagandeep who approved patient transport to [facility]. Patient is in stable condition.

## 2024-07-18 NOTE — ED PROVIDER NOTES
[facility] EMERGENCY DEPT      EMERGENCY MEDICINE     Pt Name: [place]  MRN: [id number]  Birthdate 1965  Date of evaluation: 7/18/2024  Provider: Anatoliy Thomas PA-C, WAYNE    CHIEF COMPLAINT       Chief Complaint   Patient presents with    Abnormal Lab     HISTORY OF PRESENT ILLNESS   [place] is a pleasant 59 y.o. male who presents from hematology to the emergency department for evaluation of abnormal lab. Reports he was seen by hematology and found to have low hemoglobin and was sent here for blood transfusion. He was scheduled to have surgery for his spinal stenosis next Tuesday, was told his hemoglobin was too low, and was sent to hematology. He denies previous blood transfusions. Reports he has been feeling more tired and short of breath recently when he does a lot of activity. Denies known source of bleeding. Denies hematuria, hematemesis, or blood in the stool.  Patient denies any chest pressure.  He denies any baseline shortness of breath.  He denies any recent illness.  No abdominal pain nausea vomiting or diarrhea.      Endorses lower back and bilateral leg pain this has been going on for years that is chronic.  He states this is no worse than normal.  He states is due to his spinal stenosis. States the pain is worsened with sitting, lying down, or movement.    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     Intradural extramedullary spinal tumor     Long term (current) use of anticoagulants 08/29/2019     time range)   insulin lispro (HUMALOG,ADMELOG) injection vial 0-4 Units (has no administration in time range)   oxyCODONE-acetaminophen (PERCOCET) 5-325 MG per tablet 1 tablet (1 tablet Oral Given 7/18/24 1208)         PROCEDURES: (None if blank)  Procedures:     CRITICAL CARE: (None if blank)      DISCHARGE PRESCRIPTIONS: (None if blank)  New Prescriptions    No medications on file       FINAL IMPRESSION      1. Anemia, unspecified type    2. Malaise and fatigue          DISPOSITION/PLAN   DISPOSITION Admitted 07/18/2024 02:26:20 PM      OUTPATIENT FOLLOW UP THE PATIENT:  Miya Bey MD  [address] [place] [address]  [phone]    Follow up in 1 week(s)  Iron deficiency anemia, has never had colonoscopy, evaluate for GI source      JESÚS Burgess Jamie M, PA-C  07/18/24 9290

## 2024-07-18 NOTE — H&P
Hospitalist History & Physical    Patient:  [place]    Unit/Bed:39/039A  YOB: 1965  MRN: [id number]   PCP: Leonel Adorno APRN - CNP  Code Status: Full Code    Date of Service: The patient was seen and examined on 07/18/24 and admitted to Inpatient with an expected length of stay of greater than two midnights due to medical therapy.     Chief Complaint: Abnormal lab    Assessment/Plan:    Symptomatic anemia  Hemoglobin 6.5.  Patient denies any gastrointestinal bleeding, hematuria, or hemoptysis.  Denies ever having had a colonoscopy.  Iron studies today and previously consistent with iron deficiency anemia.  May also be component of anemia in CKD.  Patient not currently on iron supplementation.  LDH mildly elevated.  Haptoglobin pending.  Status post 1 unit of PRBCs in the emergency department.  Recheck H&H later this evening.  Start Venofer 200 mg daily x 3 doses.  Start oral iron supplementation twice daily.  Patient was sent in from hematology office today.  Can continue following up with hematology upon discharge.  Referral placed to gastroenterology upon discharge for consideration of colonoscopy/upper endoscopy.  Coronary artery disease  History of CABG.  Continue Brilinta.  HFrEF  Echo on 3/8/2024 with EF of 30 to 35%, severe global hypokinesis  Outpatient diuretic regimen includes Bumex 2 mg once daily.  Patient reports compliance, but has noted increased lower extremity edema.  Will give one-time dose of Bumex 2 mg IV and then continue home dosing tomorrow.  Strict ins and outs. Daily weights. Low sodium diet. Fluid restriction.  Paroxysmal atrial fibrillation  Chronically on Coumadin.  Was recently off of Coumadin due to planned surgical intervention.  INR today 1.09.  Pharmacy to dose Coumadin.  Start Lovenox until INR is therapeutic.  Continue antiarrhythmics, rate controlling medications  L1-5 degenerative disc disease with stenosis and claudication  Currently following

## 2024-07-18 NOTE — PROGRESS NOTES
MD   2 tablet at 07/26/24 0443    warfarin placeholder: dosing by pharmacy   Other RX Placeholder Gricelda Corral MD          Allergies   Allergen Reactions    Latex Rash    Ativan [Lorazepam] Hallucinations     Has taken it several times with no hallucinations.    Gabapentin Other (See Comments)     \"Jerks, tremors\"    Lidocaine Nausea And Vomiting     Make him sick to stomache    Pcn [Penicillins] Hives    Zoloft [Sertraline Hcl] Anxiety     Worsened anxiety          Review of Systems:   Review of Systems   Constitutional:  Positive for fatigue. Negative for diaphoresis and fever.   Respiratory:  Positive for shortness of breath (Worsening).    Cardiovascular:  Negative for chest pain and palpitations.   Gastrointestinal:  Negative for constipation, diarrhea, nausea and vomiting.   All other systems reviewed and are negative.     Pertinent review of systems noted in HPI, all other ROS negative.   Objective:   Physical Exam  Vitals and nursing note reviewed.   Eyes:      Extraocular Movements: Extraocular movements intact.      Conjunctiva/sclera: Conjunctivae normal.      Pupils: Pupils are equal, round, and reactive to light.   Cardiovascular:      Rate and Rhythm: Normal rate and regular rhythm.      Pulses: Normal pulses.      Heart sounds: Normal heart sounds.   Pulmonary:      Effort: Pulmonary effort is normal.      Breath sounds: Normal breath sounds.   Abdominal:      General: Bowel sounds are normal.   Skin:     Coloration: Skin is pale.   Neurological:      General: No focal deficit present.      Mental Status: He is alert and oriented to person, place, and time. Mental status is at baseline.   Psychiatric:         Mood and Affect: Mood normal.         Behavior: Behavior normal.         Thought Content: Thought content normal.         Judgment: Judgment normal.        /61   Pulse 64   Temp 98 °F (36.7 °C) (Oral)   Resp 18   Ht 1.829 m (6')   Wt 124.7 kg (275 lb)   SpO2 97%   BMI 37.30

## 2024-07-18 NOTE — ED NOTES
Pt refused his norco and is upset that it is not a stronger pain medication. RN will inform ordering provider.

## 2024-07-18 NOTE — CONSENT
Informed Consent for Blood Component Transfusion Note    I have discussed with the patient the rationale for blood component transfusion; its benefits in treating or preventing fatigue, organ damage, or death; and its risk which includes mild transfusion reactions, rare risk of blood borne infection, or more serious but rare reactions. I have discussed the alternatives to transfusion, including the risk and consequences of not receiving transfusion. The patient had an opportunity to ask questions and had agreed to proceed with transfusion of blood components.    Electronically signed by Anatoliy Thomas PA-C on 7/18/24 at 1:31 PM EDT

## 2024-07-19 LAB
GLUCOSE BLD STRIP.AUTO-MCNC: 138 MG/DL (ref 70–108)
GLUCOSE BLD STRIP.AUTO-MCNC: 185 MG/DL (ref 70–108)
GLUCOSE BLD STRIP.AUTO-MCNC: 197 MG/DL (ref 70–108)
GLUCOSE BLD STRIP.AUTO-MCNC: 252 MG/DL (ref 70–108)
HCT VFR BLD AUTO: 25.6 % (ref 42–52)
HGB BLD-MCNC: 7.1 GM/DL (ref 14–18)
INR PPP: 1.15 (ref 0.85–1.13)

## 2024-07-19 PROCEDURE — 85018 HEMOGLOBIN: CPT

## 2024-07-19 PROCEDURE — 1200000003 HC TELEMETRY R&B

## 2024-07-19 PROCEDURE — 6370000000 HC RX 637 (ALT 250 FOR IP): Performed by: PHYSICIAN ASSISTANT

## 2024-07-19 PROCEDURE — 6370000000 HC RX 637 (ALT 250 FOR IP)

## 2024-07-19 PROCEDURE — 99232 SBSQ HOSP IP/OBS MODERATE 35: CPT | Performed by: PHYSICIAN ASSISTANT

## 2024-07-19 PROCEDURE — 36415 COLL VENOUS BLD VENIPUNCTURE: CPT

## 2024-07-19 PROCEDURE — 85610 PROTHROMBIN TIME: CPT

## 2024-07-19 PROCEDURE — 6370000000 HC RX 637 (ALT 250 FOR IP): Performed by: PHARMACIST

## 2024-07-19 PROCEDURE — 85014 HEMATOCRIT: CPT

## 2024-07-19 PROCEDURE — 2580000003 HC RX 258: Performed by: PHYSICIAN ASSISTANT

## 2024-07-19 PROCEDURE — 6360000002 HC RX W HCPCS: Performed by: PHYSICIAN ASSISTANT

## 2024-07-19 PROCEDURE — 82948 REAGENT STRIP/BLOOD GLUCOSE: CPT

## 2024-07-19 RX ORDER — WARFARIN SODIUM 5 MG/1
5 TABLET ORAL ONCE
Status: COMPLETED | OUTPATIENT
Start: 2024-07-19 | End: 2024-07-19

## 2024-07-19 RX ADMIN — METOPROLOL SUCCINATE 100 MG: 100 TABLET, EXTENDED RELEASE ORAL at 08:09

## 2024-07-19 RX ADMIN — HYDROCODONE BITARTRATE AND ACETAMINOPHEN 1 TABLET: 5; 325 TABLET ORAL at 06:46

## 2024-07-19 RX ADMIN — DICYCLOMINE HYDROCHLORIDE 10 MG: 10 CAPSULE ORAL at 16:42

## 2024-07-19 RX ADMIN — TICAGRELOR 90 MG: 90 TABLET ORAL at 21:19

## 2024-07-19 RX ADMIN — SODIUM CHLORIDE, PRESERVATIVE FREE 10 ML: 5 INJECTION INTRAVENOUS at 21:20

## 2024-07-19 RX ADMIN — SODIUM CHLORIDE 125 MG: 9 INJECTION, SOLUTION INTRAVENOUS at 09:43

## 2024-07-19 RX ADMIN — HYDROCODONE BITARTRATE AND ACETAMINOPHEN 1 TABLET: 5; 325 TABLET ORAL at 14:04

## 2024-07-19 RX ADMIN — AMIODARONE HYDROCHLORIDE 200 MG: 200 TABLET ORAL at 08:09

## 2024-07-19 RX ADMIN — ATORVASTATIN CALCIUM 40 MG: 40 TABLET, FILM COATED ORAL at 21:20

## 2024-07-19 RX ADMIN — ISOSORBIDE MONONITRATE 120 MG: 60 TABLET, EXTENDED RELEASE ORAL at 08:09

## 2024-07-19 RX ADMIN — HYDRALAZINE HYDROCHLORIDE 50 MG: 50 TABLET ORAL at 21:21

## 2024-07-19 RX ADMIN — HYDRALAZINE HYDROCHLORIDE 50 MG: 50 TABLET ORAL at 14:01

## 2024-07-19 RX ADMIN — DICYCLOMINE HYDROCHLORIDE 10 MG: 10 CAPSULE ORAL at 08:15

## 2024-07-19 RX ADMIN — INSULIN LISPRO 2 UNITS: 100 INJECTION, SOLUTION INTRAVENOUS; SUBCUTANEOUS at 14:05

## 2024-07-19 RX ADMIN — DICYCLOMINE HYDROCHLORIDE 10 MG: 10 CAPSULE ORAL at 14:04

## 2024-07-19 RX ADMIN — SODIUM CHLORIDE, PRESERVATIVE FREE 10 ML: 5 INJECTION INTRAVENOUS at 08:12

## 2024-07-19 RX ADMIN — BUMETANIDE 2 MG: 1 TABLET ORAL at 08:09

## 2024-07-19 RX ADMIN — FOLIC ACID 1 MG: 1 TABLET ORAL at 08:11

## 2024-07-19 RX ADMIN — ALPRAZOLAM 0.5 MG: 0.5 TABLET ORAL at 21:19

## 2024-07-19 RX ADMIN — MEXILETINE HYDROCHLORIDE 300 MG: 150 CAPSULE ORAL at 21:21

## 2024-07-19 RX ADMIN — MEXILETINE HYDROCHLORIDE 300 MG: 150 CAPSULE ORAL at 06:44

## 2024-07-19 RX ADMIN — MEXILETINE HYDROCHLORIDE 300 MG: 150 CAPSULE ORAL at 14:01

## 2024-07-19 RX ADMIN — WARFARIN SODIUM 5 MG: 5 TABLET ORAL at 18:43

## 2024-07-19 RX ADMIN — FERROUS SULFATE TAB 325 MG (65 MG ELEMENTAL FE) 325 MG: 325 (65 FE) TAB at 16:38

## 2024-07-19 RX ADMIN — FERROUS SULFATE TAB 325 MG (65 MG ELEMENTAL FE) 325 MG: 325 (65 FE) TAB at 08:11

## 2024-07-19 RX ADMIN — HYDRALAZINE HYDROCHLORIDE 50 MG: 50 TABLET ORAL at 08:08

## 2024-07-19 RX ADMIN — TICAGRELOR 90 MG: 90 TABLET ORAL at 08:15

## 2024-07-19 RX ADMIN — HYDROCODONE BITARTRATE AND ACETAMINOPHEN 1 TABLET: 5; 325 TABLET ORAL at 21:19

## 2024-07-19 ASSESSMENT — PAIN - FUNCTIONAL ASSESSMENT
PAIN_FUNCTIONAL_ASSESSMENT: PREVENTS OR INTERFERES SOME ACTIVE ACTIVITIES AND ADLS
PAIN_FUNCTIONAL_ASSESSMENT: ACTIVITIES ARE NOT PREVENTED

## 2024-07-19 ASSESSMENT — PAIN SCALES - GENERAL
PAINLEVEL_OUTOF10: 6
PAINLEVEL_OUTOF10: 4
PAINLEVEL_OUTOF10: 6
PAINLEVEL_OUTOF10: 6
PAINLEVEL_OUTOF10: 8

## 2024-07-19 ASSESSMENT — PAIN DESCRIPTION - ORIENTATION
ORIENTATION: RIGHT;LEFT;LOWER
ORIENTATION: LOWER

## 2024-07-19 ASSESSMENT — PAIN DESCRIPTION - DESCRIPTORS
DESCRIPTORS: ACHING;DISCOMFORT;SHARP
DESCRIPTORS: ACHING;DISCOMFORT

## 2024-07-19 ASSESSMENT — PAIN DESCRIPTION - LOCATION
LOCATION: BACK;LEG
LOCATION: BACK

## 2024-07-19 ASSESSMENT — PAIN DESCRIPTION - PAIN TYPE: TYPE: CHRONIC PAIN

## 2024-07-19 NOTE — CARE COORDINATION
Case Management Assessment Initial Evaluation    Date/Time of Evaluation: 7/19/2024 11:55 AM  Assessment Completed by: Lorna Smith RN    If patient is discharged prior to next notation, then this note serves as note for discharge by case management.    Patient Name: [place]                   YOB: 1965  Diagnosis: Malaise and fatigue [R53.81, R53.83]  Symptomatic anemia [D64.9]  Anemia, unspecified type [D64.9]                   Date / Time: 7/18/2024 10:14 AM  Location: [address] [place] [address]     Patient Admission Status: Inpatient   Readmission Risk Low 0-14, Mod 15-19), High > 20: Readmission Risk Score: 32.2    Current PCP: Leonel Adorno, ISABELLA - CNP    Additional Case Management Notes: Admitted from ER via Hematology due to abnormal lab values. Has upcoming back surgery planned but was told labs needed addressed. Hgb 6.5. One unit PRC's transfused. IV iron.     Procedures: No    Imaging: No acute findings.    Patient Goals/Plan/Treatment Preferences: Met with [place]. He is somewhat irritable with interview questions so visit kept short. He resides alone independently when well. He has a PCP and is insured.            07/19/24 1156   Service Assessment   Patient Orientation Alert and Oriented   Cognition Alert   History Provided By Patient   Primary Caregiver Self   Support Systems Family Members   Patient's Healthcare Decision Maker is: Named in Scanned ACP Document   PCP Verified by CM Yes   Last Visit to PCP Within last 3 months   Prior Functional Level Independent in ADLs/IADLs   Current Functional Level Independent in ADLs/IADLs   Can patient return to prior living arrangement Yes   Ability to make needs known: Good   Family able to assist with home care needs: Yes   Would you like for me to discuss the discharge plan with any other family members/significant others, and if so, who? No   Financial Resources Other (Comment)  ([employer])   Community Resources None   Social/Functional History   Lives

## 2024-07-19 NOTE — PROGRESS NOTES
Hospitalist Progress Note      Patient:  [place] 59 y.o. male     Unit/Bed:8B-34/034-A    Date of Admission: 7/18/2024      ASSESSMENT AND PLAN    Active Problems  Symptomatic anemia, improving / hx chronic anemia / HARSHA  Hx anemia of chronic disease (CKD), HARSHA  Hx noncompliance, reports he was \"never told\" he needed to be on iron supplementation. Chart review would suggest otherwise.  S/p Ferrlecit  125 mg daily x3 doses  Will transition to PO ferrous sulfate at discharge  Sent from Hematology office to ED 7/18  Hemoglobin 6.5  S/p 1 unit PRBC  Cont with Q8 H&H for now   Hgb stable today at 7.3  Should he remain stable will discharge tomorrow  Cont to monitor and transfuse for Hgb < 7  Follow up with Heme in the next 2-3 weeks  Patient denies any gastrointestinal bleeding, hematuria, or hemoptysis. Denies ever having had a colonoscopy.   Referral placed to gastroenterology upon discharge for consideration of colonoscopy/upper endoscopy for at minimum screening.     L1-5 degenerative disc disease with stenosis and claudication  Currently following with spine surgery.  Planning for surgical intervention, but this was delayed due to anemia.  Norco ordered for pain control. Pt reports this si \"not doing anything\" for him. We discussed that he is on Tramadol at home and this is stronger than that. Recommended discussing Pain Control with his surgeon Dr. Rae.     HFrEF s/p ICD placement  Echo on 3/8/2024 with EF of 30 to 35%, severe global hypokinesis   Outpatient diuretic regimen includes Bumex 2 mg once daily. Patient reports compliance, but has noted increased lower extremity edema. Will give one-time dose of Bumex 2 mg IV and then continue home dosing tomorrow.   Strict I/Os, daily weights. Low Na / fluid restriction.     Resolved Problems    Chronic Conditions (reviewed and stable unless otherwise stated)  PAF on OAC  Chronically on Coumadin. Was recently off of Coumadin due to planned surgical

## 2024-07-19 NOTE — PLAN OF CARE
Problem: Discharge Planning  Goal: Discharge to home or other facility with appropriate resources  Outcome: Progressing     Problem: Pain  Goal: Verbalizes/displays adequate comfort level or baseline comfort level  Outcome: Progressing     Problem: ABCDS Injury Assessment  Goal: Absence of physical injury  Outcome: Progressing     Problem: Safety - Adult  Goal: Free from fall injury  Outcome: Progressing     Problem: Skin/Tissue Integrity  Goal: Absence of new skin breakdown  Description: 1.  Monitor for areas of redness and/or skin breakdown  2.  Assess vascular access sites hourly  3.  Every 4-6 hours minimum:  Change oxygen saturation probe site  4.  Every 4-6 hours:  If on nasal continuous positive airway pressure, respiratory therapy assess nares and determine need for appliance change or resting period.  Outcome: Progressing     Problem: Metabolic/Fluid and Electrolytes - Adult  Goal: Electrolytes maintained within normal limits  Outcome: Progressing     Problem: Hematologic - Adult  Goal: Maintains hematologic stability  Outcome: Progressing

## 2024-07-19 NOTE — PROGRESS NOTES
Warfarin Pharmacy Consult Note    Warfarin Indication: Atrial fibrillation or Atrial flutter  Target INR: 2.0-3.0  Dose prior to admission: 2.5mg MF, 5mg all other days    Recent Labs     07/18/24  1529 07/19/24  0613   INR 1.09 1.15*     Recent Labs     07/18/24  1027 07/18/24  1648 07/18/24  2117   HGB 6.5* 7.4* 7.3*     --   --         Concurrent anticoagulants/antiplatelets: Patient being bridged with Lovenox PTA 2/2 procedure on 7/16/24. AC resumed 7/17/24 per patient. Brilinta (home med)  Significant warfarin drug-drug interactions: amiodarone (home med)     Date INR Warfarin Dose   7/18/24 1.09 5 mg   7/19/24  1.15  5 mg                                                Monitoring:                   INR will be monitored daily until therapeutic INR is achieved.    **Please contact pharmacy for discharge instructions when indicated**    Stephenie ROSAS.Ph., BCPS., 7/19/2024,11:54 AM

## 2024-07-20 LAB
ANION GAP SERPL CALC-SCNC: 12 MEQ/L (ref 8–16)
BUN SERPL-MCNC: 45 MG/DL (ref 7–22)
CALCIUM SERPL-MCNC: 7.6 MG/DL (ref 8.5–10.5)
CHLORIDE SERPL-SCNC: 106 MEQ/L (ref 98–111)
CO2 SERPL-SCNC: 19 MEQ/L (ref 23–33)
CREAT SERPL-MCNC: 3.9 MG/DL (ref 0.4–1.2)
GFR SERPL CREATININE-BSD FRML MDRD: 17 ML/MIN/1.73M2
GLUCOSE BLD STRIP.AUTO-MCNC: 143 MG/DL (ref 70–108)
GLUCOSE BLD STRIP.AUTO-MCNC: 185 MG/DL (ref 70–108)
GLUCOSE BLD STRIP.AUTO-MCNC: 197 MG/DL (ref 70–108)
GLUCOSE BLD STRIP.AUTO-MCNC: 224 MG/DL (ref 70–108)
GLUCOSE SERPL-MCNC: 152 MG/DL (ref 70–108)
HCT VFR BLD AUTO: 26.8 % (ref 42–52)
HGB BLD-MCNC: 7.6 GM/DL (ref 14–18)
INR PPP: 1.24 (ref 0.85–1.13)
POTASSIUM SERPL-SCNC: 4.6 MEQ/L (ref 3.5–5.2)
SODIUM SERPL-SCNC: 137 MEQ/L (ref 135–145)

## 2024-07-20 PROCEDURE — 2580000003 HC RX 258: Performed by: PHYSICIAN ASSISTANT

## 2024-07-20 PROCEDURE — 85014 HEMATOCRIT: CPT

## 2024-07-20 PROCEDURE — 6370000000 HC RX 637 (ALT 250 FOR IP): Performed by: PHYSICIAN ASSISTANT

## 2024-07-20 PROCEDURE — 36415 COLL VENOUS BLD VENIPUNCTURE: CPT

## 2024-07-20 PROCEDURE — 85027 COMPLETE CBC AUTOMATED: CPT

## 2024-07-20 PROCEDURE — 80048 BASIC METABOLIC PNL TOTAL CA: CPT

## 2024-07-20 PROCEDURE — 99232 SBSQ HOSP IP/OBS MODERATE 35: CPT | Performed by: PHYSICIAN ASSISTANT

## 2024-07-20 PROCEDURE — 82948 REAGENT STRIP/BLOOD GLUCOSE: CPT

## 2024-07-20 PROCEDURE — 6370000000 HC RX 637 (ALT 250 FOR IP)

## 2024-07-20 PROCEDURE — 36430 TRANSFUSION BLD/BLD COMPNT: CPT

## 2024-07-20 PROCEDURE — 1200000003 HC TELEMETRY R&B

## 2024-07-20 PROCEDURE — 85610 PROTHROMBIN TIME: CPT

## 2024-07-20 PROCEDURE — 6360000002 HC RX W HCPCS: Performed by: PHYSICIAN ASSISTANT

## 2024-07-20 PROCEDURE — 85018 HEMOGLOBIN: CPT

## 2024-07-20 RX ORDER — SODIUM CHLORIDE 9 MG/ML
5 INJECTION, SOLUTION INTRAVENOUS CONTINUOUS
Status: DISCONTINUED | OUTPATIENT
Start: 2024-07-20 | End: 2024-07-21

## 2024-07-20 RX ORDER — LIDOCAINE 40 MG/G
CREAM TOPICAL EVERY 30 MIN PRN
Status: DISCONTINUED | OUTPATIENT
Start: 2024-07-20 | End: 2024-07-21 | Stop reason: HOSPADM

## 2024-07-20 RX ORDER — WARFARIN SODIUM 5 MG/1
5 TABLET ORAL
Status: COMPLETED | OUTPATIENT
Start: 2024-07-20 | End: 2024-07-20

## 2024-07-20 RX ADMIN — AMIODARONE HYDROCHLORIDE 200 MG: 200 TABLET ORAL at 10:08

## 2024-07-20 RX ADMIN — METOPROLOL SUCCINATE 100 MG: 100 TABLET, EXTENDED RELEASE ORAL at 10:07

## 2024-07-20 RX ADMIN — SODIUM CHLORIDE, PRESERVATIVE FREE 10 ML: 5 INJECTION INTRAVENOUS at 21:53

## 2024-07-20 RX ADMIN — FOLIC ACID 1 MG: 1 TABLET ORAL at 10:08

## 2024-07-20 RX ADMIN — MEXILETINE HYDROCHLORIDE 300 MG: 150 CAPSULE ORAL at 05:00

## 2024-07-20 RX ADMIN — HYDROCODONE BITARTRATE AND ACETAMINOPHEN 1 TABLET: 5; 325 TABLET ORAL at 05:02

## 2024-07-20 RX ADMIN — TICAGRELOR 90 MG: 90 TABLET ORAL at 21:56

## 2024-07-20 RX ADMIN — ISOSORBIDE MONONITRATE 120 MG: 60 TABLET, EXTENDED RELEASE ORAL at 10:08

## 2024-07-20 RX ADMIN — FERROUS SULFATE TAB 325 MG (65 MG ELEMENTAL FE) 325 MG: 325 (65 FE) TAB at 10:07

## 2024-07-20 RX ADMIN — WARFARIN SODIUM 5 MG: 5 TABLET ORAL at 18:28

## 2024-07-20 RX ADMIN — HYDRALAZINE HYDROCHLORIDE 50 MG: 50 TABLET ORAL at 10:06

## 2024-07-20 RX ADMIN — HYDROCODONE BITARTRATE AND ACETAMINOPHEN 1 TABLET: 5; 325 TABLET ORAL at 12:39

## 2024-07-20 RX ADMIN — ONDANSETRON 4 MG: 2 INJECTION INTRAMUSCULAR; INTRAVENOUS at 18:31

## 2024-07-20 RX ADMIN — ATORVASTATIN CALCIUM 40 MG: 40 TABLET, FILM COATED ORAL at 21:52

## 2024-07-20 RX ADMIN — DICYCLOMINE HYDROCHLORIDE 10 MG: 10 CAPSULE ORAL at 18:28

## 2024-07-20 RX ADMIN — BUMETANIDE 2 MG: 1 TABLET ORAL at 10:07

## 2024-07-20 RX ADMIN — SODIUM CHLORIDE 125 MG: 9 INJECTION, SOLUTION INTRAVENOUS at 16:11

## 2024-07-20 RX ADMIN — TICAGRELOR 90 MG: 90 TABLET ORAL at 12:33

## 2024-07-20 RX ADMIN — HYDRALAZINE HYDROCHLORIDE 50 MG: 50 TABLET ORAL at 12:42

## 2024-07-20 RX ADMIN — MEXILETINE HYDROCHLORIDE 300 MG: 150 CAPSULE ORAL at 21:52

## 2024-07-20 RX ADMIN — HYDROCODONE BITARTRATE AND ACETAMINOPHEN 1 TABLET: 5; 325 TABLET ORAL at 21:56

## 2024-07-20 RX ADMIN — DICYCLOMINE HYDROCHLORIDE 10 MG: 10 CAPSULE ORAL at 12:45

## 2024-07-20 RX ADMIN — DICYCLOMINE HYDROCHLORIDE 10 MG: 10 CAPSULE ORAL at 10:07

## 2024-07-20 RX ADMIN — MEXILETINE HYDROCHLORIDE 300 MG: 150 CAPSULE ORAL at 12:42

## 2024-07-20 RX ADMIN — SODIUM CHLORIDE 5 ML/HR: 9 INJECTION, SOLUTION INTRAVENOUS at 12:15

## 2024-07-20 RX ADMIN — HYDRALAZINE HYDROCHLORIDE 50 MG: 50 TABLET ORAL at 21:52

## 2024-07-20 ASSESSMENT — PAIN DESCRIPTION - ORIENTATION: ORIENTATION: LOWER

## 2024-07-20 ASSESSMENT — PAIN DESCRIPTION - LOCATION: LOCATION: BACK

## 2024-07-20 ASSESSMENT — PAIN SCALES - GENERAL: PAINLEVEL_OUTOF10: 8

## 2024-07-20 ASSESSMENT — PAIN - FUNCTIONAL ASSESSMENT: PAIN_FUNCTIONAL_ASSESSMENT: ACTIVITIES ARE NOT PREVENTED

## 2024-07-20 ASSESSMENT — PAIN DESCRIPTION - DESCRIPTORS: DESCRIPTORS: ACHING;DISCOMFORT;SORE

## 2024-07-20 NOTE — PROGRESS NOTES
Hospitalist Progress Note      Patient:  [place] 59 y.o. male     Unit/Bed:8B-34/034-A    Date of Admission: 7/18/2024      ASSESSMENT AND PLAN    Active Problems  Symptomatic anemia, improving / hx chronic anemia / HARSHA  Hx anemia of chronic disease (CKD), HARSHA  Hx noncompliance, reports he was \"never told\" he needed to be on iron supplementation. Chart review would suggest otherwise.  S/p Ferrlecit  125 mg daily x3 doses  Will transition to PO ferrous sulfate at discharge  Sent from Hematology office to ED 7/18  Hemoglobin 6.5  S/p 1 unit PRBC with improvement but now again 6.9 today requiring additional 1 unit PRBC  Cont with Q12 H&H for now   Transfuse for Hgb < 7  Follow up with Heme in the next 2-3 weeks  Patient denies any gastrointestinal bleeding, hematuria, or hemoptysis. Denies ever having had a colonoscopy.   Referral placed to gastroenterology upon discharge for consideration of colonoscopy/upper endoscopy for at minimum screening.   FOBT negative    L1-5 degenerative disc disease with stenosis and claudication  Currently following with spine surgery.  Planning for surgical intervention, but this was delayed due to anemia.  Norco ordered for pain control. Pt reports this si \"not doing anything\" for him. We discussed that he is on Tramadol at home and this is stronger than that. Recommended discussing Pain Control with his surgeon Dr. Rae.     HFrEF s/p ICD placement  Echo on 3/8/2024 with EF of 30 to 35%, severe global hypokinesis   Outpatient diuretic regimen includes Bumex 2 mg once daily. Patient reports compliance, but has noted increased lower extremity edema. Will give one-time dose of Bumex 2 mg IV and then continue home dosing tomorrow.   Strict I/Os, daily weights. Low Na / fluid restriction.     Resolved Problems    Chronic Conditions (reviewed and stable unless otherwise stated)  PAF on OAC  Chronically on Coumadin. Was recently off of Coumadin due to planned surgical  His iron studies in April 2024 were reflective of iron deficiency anemia.  The patient reports he is not taking any iron supplementation, though.  He denies any gastrointestinal bleeding, hematuria, or hemoptysis.  He denies ever having had a colonoscopy or an upper endoscopy.  He does take Coumadin for history of atrial fibrillation and Brilinta for history of coronary artery disease.  He does report feeling fatigued and short of breath.  He also reports chronic back pain due to history of spinal stenosis.  He was actually supposed to have spine surgery a couple of days ago, but this was canceled due to the anemia.  He also has chronic lower extremity swelling and states this is a little bit worse than baseline.  Otherwise, he has no complaints at this time. \"    7/19: pt is very ornery today. Sitting up in the chair. Reports feeling tired but overall approved compared to yesterday. He feels his pain is not being controlled and states that \"Norco does nothing for me\". We discussed that at home his pain control consists of Tramadol and that what he is currently on is a stronger narcotic. Pt started to become upset and accuses this provider of acting like he is a \"drug addict\". Pt was informed that he can discuss further with Dr. Waller.      Medications:    Infusion Medications    sodium chloride 5 mL/hr (07/20/24 1215)    sodium chloride      sodium chloride      dextrose      Scheduled Medications    warfarin  5 mg Oral Once    sodium chloride flush  5-40 mL IntraVENous 2 times per day    ALPRAZolam  0.5 mg Oral Nightly    amiodarone  200 mg Oral Daily    atorvastatin  40 mg Oral Nightly    ticagrelor  90 mg Oral BID    bumetanide  2 mg Oral Daily    enoxaparin  1 mg/kg SubCUTAneous Q24H    folic acid  1 mg Oral Daily    hydrALAZINE  50 mg Oral TID    mexiletine  300 mg Oral 3 times per day    metoprolol succinate  100 mg Oral Daily    isosorbide mononitrate  120 mg Oral Daily    dicyclomine  10 mg Oral TID WC

## 2024-07-20 NOTE — PROGRESS NOTES
Warfarin Pharmacy Consult Note    Warfarin Indication: Atrial fibrillation or Atrial flutter  Target INR: 2.0-3.0  Dose prior to admission: 2.5 mg MF, 5 mg all other days    Recent Labs     07/18/24  1529 07/19/24  0613 07/20/24  0606   INR 1.09 1.15* 1.24*     Recent Labs     07/18/24  1027 07/18/24  1648 07/18/24  2117 07/19/24  1947 07/20/24  0606   HGB 6.5*   < > 7.3* 7.1* 6.9*     --   --   --  131    < > = values in this interval not displayed.     Concurrent anticoagulants/antiplatelets: Patient being bridged with Lovenox PTA 2/2 procedure on 7/16/24. AC resumed 7/17/24 per patient. Brilinta (home med)  Significant warfarin drug-drug interactions: amiodarone (home med)     Date INR Warfarin Dose   7/18/24 1.09 5 mg   7/19/24  1.15  5 mg     7/20/2024 1.24  5 mg                                          Monitoring:                   INR will be monitored daily until therapeutic INR is achieved.    **Please contact pharmacy for discharge instructions when indicated**    Ivon Hannah, MaurisioD, BCPS  7/20/2024  11:35 AM

## 2024-07-20 NOTE — FLOWSHEET NOTE
07/20/24 0336   Treatment Team Notification   Reason for Communication Patient/Family request   Name of Team Member Notified Ju Umana   Treatment Team Role Attending Provider   Method of Communication Secure Message   Response No new orders   Notification Time 0302     Patient states he is anxious and is requesting something for it.He already had his nightly 0.5mg xanax.     Ju stated patient already had meds for anxiety so she wasn't sure what else she could do.     RN messaged back and stated patient was requesting an additional dose of xanax.    Ju states she would not order another dose tonight but if patient continues to have problems throughout the day, Day shift team could adjust dosage/frequency.

## 2024-07-21 VITALS
RESPIRATION RATE: 16 BRPM | DIASTOLIC BLOOD PRESSURE: 62 MMHG | BODY MASS INDEX: 37.25 KG/M2 | SYSTOLIC BLOOD PRESSURE: 142 MMHG | OXYGEN SATURATION: 96 % | HEART RATE: 57 BPM | WEIGHT: 275 LBS | HEIGHT: 72 IN | TEMPERATURE: 98.4 F

## 2024-07-21 LAB
ANION GAP SERPL CALC-SCNC: 11 MEQ/L (ref 8–16)
BUN SERPL-MCNC: 43 MG/DL (ref 7–22)
CALCIUM SERPL-MCNC: 7.7 MG/DL (ref 8.5–10.5)
CHLORIDE SERPL-SCNC: 105 MEQ/L (ref 98–111)
CO2 SERPL-SCNC: 21 MEQ/L (ref 23–33)
CREAT SERPL-MCNC: 4 MG/DL (ref 0.4–1.2)
DEPRECATED RDW RBC AUTO: 56.1 FL (ref 35–45)
ERYTHROCYTE [DISTWIDTH] IN BLOOD BY AUTOMATED COUNT: 17.5 % (ref 11.5–14.5)
GFR SERPL CREATININE-BSD FRML MDRD: 16 ML/MIN/1.73M2
GLUCOSE BLD STRIP.AUTO-MCNC: 185 MG/DL (ref 70–108)
GLUCOSE BLD STRIP.AUTO-MCNC: 268 MG/DL (ref 70–108)
GLUCOSE SERPL-MCNC: 151 MG/DL (ref 70–108)
HAPTOGLOB SERPL-MCNC: 155 MG/DL (ref 30–200)
HCT VFR BLD AUTO: 24.5 % (ref 42–52)
HCT VFR BLD AUTO: 26.9 % (ref 42–52)
HGB BLD-MCNC: 6.9 GM/DL (ref 14–18)
HGB BLD-MCNC: 7.5 GM/DL (ref 14–18)
INR PPP: 1.38 (ref 0.85–1.13)
MCH RBC QN AUTO: 25.3 PG (ref 26–33)
MCHC RBC AUTO-ENTMCNC: 28.2 GM/DL (ref 32.2–35.5)
MCV RBC AUTO: 89.7 FL (ref 80–94)
PATHOLOGIST REVIEW: ABNORMAL
PLATELET # BLD AUTO: 131 THOU/MM3 (ref 130–400)
PMV BLD AUTO: 11.5 FL (ref 9.4–12.4)
POTASSIUM SERPL-SCNC: 4.7 MEQ/L (ref 3.5–5.2)
RBC # BLD AUTO: 2.73 MILL/MM3 (ref 4.7–6.1)
SODIUM SERPL-SCNC: 137 MEQ/L (ref 135–145)
WBC # BLD AUTO: 6.2 THOU/MM3 (ref 4.8–10.8)

## 2024-07-21 PROCEDURE — 85610 PROTHROMBIN TIME: CPT

## 2024-07-21 PROCEDURE — 82948 REAGENT STRIP/BLOOD GLUCOSE: CPT

## 2024-07-21 PROCEDURE — 6370000000 HC RX 637 (ALT 250 FOR IP): Performed by: PHYSICIAN ASSISTANT

## 2024-07-21 PROCEDURE — 36415 COLL VENOUS BLD VENIPUNCTURE: CPT

## 2024-07-21 PROCEDURE — 6370000000 HC RX 637 (ALT 250 FOR IP)

## 2024-07-21 PROCEDURE — 99239 HOSP IP/OBS DSCHRG MGMT >30: CPT | Performed by: PHYSICIAN ASSISTANT

## 2024-07-21 PROCEDURE — 85014 HEMATOCRIT: CPT

## 2024-07-21 PROCEDURE — 80048 BASIC METABOLIC PNL TOTAL CA: CPT

## 2024-07-21 PROCEDURE — 85018 HEMOGLOBIN: CPT

## 2024-07-21 RX ORDER — HYDROCODONE BITARTRATE AND ACETAMINOPHEN 5; 325 MG/1; MG/1
1 TABLET ORAL EVERY 6 HOURS PRN
Qty: 12 TABLET | Refills: 0 | Status: ON HOLD | OUTPATIENT
Start: 2024-07-21 | End: 2024-07-27 | Stop reason: HOSPADM

## 2024-07-21 RX ORDER — FERROUS SULFATE 325(65) MG
325 TABLET ORAL 2 TIMES DAILY WITH MEALS
Qty: 30 TABLET | Refills: 3 | Status: SHIPPED | OUTPATIENT
Start: 2024-07-21

## 2024-07-21 RX ORDER — LIDOCAINE 40 MG/G
CREAM TOPICAL
Qty: 120 G | Refills: 0 | Status: ON HOLD | OUTPATIENT
Start: 2024-07-21 | End: 2024-07-27 | Stop reason: HOSPADM

## 2024-07-21 RX ORDER — WARFARIN SODIUM 5 MG/1
5 TABLET ORAL
Status: COMPLETED | OUTPATIENT
Start: 2024-07-21 | End: 2024-07-21

## 2024-07-21 RX ADMIN — FERROUS SULFATE TAB 325 MG (65 MG ELEMENTAL FE) 325 MG: 325 (65 FE) TAB at 09:01

## 2024-07-21 RX ADMIN — ISOSORBIDE MONONITRATE 120 MG: 60 TABLET, EXTENDED RELEASE ORAL at 09:01

## 2024-07-21 RX ADMIN — MEXILETINE HYDROCHLORIDE 300 MG: 150 CAPSULE ORAL at 16:55

## 2024-07-21 RX ADMIN — HYDROCODONE BITARTRATE AND ACETAMINOPHEN 1 TABLET: 5; 325 TABLET ORAL at 14:20

## 2024-07-21 RX ADMIN — TICAGRELOR 90 MG: 90 TABLET ORAL at 09:02

## 2024-07-21 RX ADMIN — AMIODARONE HYDROCHLORIDE 200 MG: 200 TABLET ORAL at 09:00

## 2024-07-21 RX ADMIN — ALPRAZOLAM 0.5 MG: 0.5 TABLET ORAL at 01:58

## 2024-07-21 RX ADMIN — HYDRALAZINE HYDROCHLORIDE 50 MG: 50 TABLET ORAL at 16:55

## 2024-07-21 RX ADMIN — INSULIN LISPRO 2 UNITS: 100 INJECTION, SOLUTION INTRAVENOUS; SUBCUTANEOUS at 09:03

## 2024-07-21 RX ADMIN — MEXILETINE HYDROCHLORIDE 300 MG: 150 CAPSULE ORAL at 09:02

## 2024-07-21 RX ADMIN — WARFARIN SODIUM 5 MG: 5 TABLET ORAL at 16:55

## 2024-07-21 RX ADMIN — HYDRALAZINE HYDROCHLORIDE 50 MG: 50 TABLET ORAL at 09:01

## 2024-07-21 RX ADMIN — FOLIC ACID 1 MG: 1 TABLET ORAL at 09:01

## 2024-07-21 RX ADMIN — HYDROCODONE BITARTRATE AND ACETAMINOPHEN 1 TABLET: 5; 325 TABLET ORAL at 09:02

## 2024-07-21 RX ADMIN — BUMETANIDE 2 MG: 1 TABLET ORAL at 09:01

## 2024-07-21 RX ADMIN — DICYCLOMINE HYDROCHLORIDE 10 MG: 10 CAPSULE ORAL at 09:01

## 2024-07-21 RX ADMIN — DICYCLOMINE HYDROCHLORIDE 10 MG: 10 CAPSULE ORAL at 14:21

## 2024-07-21 ASSESSMENT — PAIN DESCRIPTION - ORIENTATION: ORIENTATION: RIGHT;LEFT;LOWER

## 2024-07-21 ASSESSMENT — PAIN DESCRIPTION - DESCRIPTORS: DESCRIPTORS: SHARP

## 2024-07-21 ASSESSMENT — PAIN DESCRIPTION - LOCATION: LOCATION: BACK;LEG

## 2024-07-21 ASSESSMENT — PAIN SCALES - GENERAL: PAINLEVEL_OUTOF10: 8

## 2024-07-21 NOTE — DISCHARGE SUMMARY
Hospitalist Discharge Note      Patient:  Garrett Duncan    Unit/Bed:8B-34/034-A  YOB: 1965  MRN: [id number]   Acct: [id number]     PCP: Leonel Adorno APRN - CNP  Date of Admission: 7/18/2024      Discharge date: No discharge date for patient encounter.    Chief Complaint on presentation :-  Abnormal lab     Discharge Assessment and Plan:-   Symptomatic anemia, improving / hx chronic anemia / HARSHA  Hx anemia of chronic disease (CKD), HARSHA  Hx noncompliance, reports he was \"never told\" he needed to be on iron supplementation. Chart review would suggest otherwise.  May benefit from AUBRIE - can discuss with Nephrology at f/u appt  S/p Ferrlecit  125 mg daily x3 doses  Will transition to PO ferrous sulfate at discharge  Sent from Hematology office to ED 7/18  Hemoglobin 6.5  S/p 2 unit PRBC total   Hgb stable 7 range, okay for discharge home  Outpatient CBC in 3 days  Follow up with Heme in the next 2-3 weeks  Patient denies any gastrointestinal bleeding, hematuria, or hemoptysis. Denies ever having had a colonoscopy.   Referral placed to gastroenterology upon discharge for consideration of colonoscopy/upper endoscopy for at minimum screening.   FOBT negative     L1-5 degenerative disc disease with stenosis and claudication  Currently following with spine surgery.  Planning for surgical intervention, but this was delayed due to anemia.  F/u with Ortho Spine at discharge for reschedule  Denison ordered for pain control.   3 days given at discharge as pot reports he ran out. He was instructed to call Dr. Rae's office for further management.      HFrEF s/p ICD placement  Echo on 3/8/2024 with EF of 30 to 35%, severe global hypokinesis   Outpatient diuretic regimen includes Bumex 2 mg once daily. Patient reports compliance, but has noted increased lower extremity edema. Will give one-time dose of Bumex 2 mg IV and then continue home dosing tomorrow.   Strict I/Os, daily weights. Low Na  does take Coumadin for history of atrial fibrillation and Brilinta for history of coronary artery disease.  He does report feeling fatigued and short of breath.  He also reports chronic back pain due to history of spinal stenosis.  He was actually supposed to have spine surgery a couple of days ago, but this was canceled due to the anemia.  He also has chronic lower extremity swelling and states this is a little bit worse than baseline.  Otherwise, he has no complaints at this time. \"     7/19: pt is very ornery today. Sitting up in the chair. Reports feeling tired but overall approved compared to yesterday. He feels his pain is not being controlled and states that \"Norco does nothing for me\". We discussed that at home his pain control consists of Tramadol and that what he is currently on is a stronger narcotic. Pt started to become upset and accuses this provider of acting like he is a \"drug addict\". Pt was informed that he can discuss further with Dr. Waller.      7/20: Patient is sitting up in the chair. He reports feeling better today. He apologizes that he was upset yesterday and states that he was in a lot of pain. Noted to be requiring a unit of blood once again today, he has to remain. Denies any new complaints     7/21: Day of discharge.  Patient is seen sitting up in the chair.  He reports feeling good today, reports resolution of his fatigue.  Denies any chest pain or palpitations. His hemoglobin is remaining stable in the 7 range today.  We discussed the importance of being compliant with his iron supplementation after discharge.  Additionally, we discussed the importance of his fluid restriction and low-sodium diet given his HFrEF and current BLE. Offered to myke pt further inpatient, however patient is adamant that he will be discharged home and he feels it is related to the fact that he does not comply  wit his fluid restriction / Low Na diet. He will have an outpatient CBC/BMP done in 3 days.

## 2024-07-21 NOTE — PROGRESS NOTES
Clinical Pharmacy Note                                               Warfarin Discharge Recommendations    Patient discharged from [facility] today on warfarin.    Warfarin indication: Atrial fibrillation or Atrial flutter  INR goal during admission: 2.0-3.0  Previous home warfarin regimen: 2.5 mg MF, 5 mg TWThSS  Interacting medications at discharge: Lovenox, ticagrelor, amiodarone  Coumadin 5 mg tabs    Hospital Warfarin Doses & INR Results  Date INR Warfarin Dose   7/18/24 1.09 5 mg   7/19/24  1.15  5 mg     7/20/2024 1.24  5 mg     7/21/2024 1.38  5 mg                                Recent INRs:  Recent Labs     07/21/24  0321   INR 1.38*     Warfarin Discharge Instructions:   Date Warfarin Dose Lovenox    07/22/24 (tomorrow) 5 mg (1 tablet) 120 mg daily    07/23/24 5 mg (1 tablet) 120 mg daily     Next INR date: Call [facility] Coumadin clinic for an appt on 7/24/24    Ivon Hannah PharmD, BCPS  7/21/2024  2:42 PM

## 2024-07-21 NOTE — PROGRESS NOTES
Warfarin Pharmacy Consult Note    Warfarin Indication: Atrial fibrillation or Atrial flutter  Target INR: 2.0-3.0  Dose prior to admission: 2.5 mg MF, 5 mg TWTHSS    Recent Labs     07/19/24  0613 07/20/24  0606 07/21/24  0321   INR 1.15* 1.24* 1.38*     Recent Labs     07/20/24  0606 07/20/24  1917 07/21/24  0321   HGB 6.9* 7.6* 7.5*     --   --      Concurrent anticoagulants/antiplatelets: Patient being bridged with Lovenox PTA 2/2 procedure on 7/16/24. AC resumed 7/17/24 per patient. Brilinta (home med)  Significant warfarin drug-drug interactions: amiodarone (home med)     Date INR Warfarin Dose   7/18/24 1.09 5 mg   7/19/24  1.15  5 mg     7/20/2024 1.24  5 mg     7/21/2024 1.38  5 mg                                Monitoring:                   INR will be monitored daily until therapeutic INR is achieved.  Hemoglobin 7.5   Received 1 unit of blood 7/20    **Please contact pharmacy for discharge instructions when indicated**    Ivon Hannah, PharmD, [facility]  7/21/2024  2:05 PM

## 2024-07-21 NOTE — DISCHARGE INSTR - MEDS
Clinical Pharmacy Note                                               Warfarin Discharge Recommendations    Patient discharged from [facility] today on warfarin.    Warfarin indication: Atrial fibrillation or Atrial flutter  INR goal during admission: 2.0-3.0  Previous home warfarin regimen: 2.5 mg MF, 5 mg TWThSS  Interacting medications at discharge: Lovenox, ticagrelor, amiodarone  Coumadin 5 mg tabs    Hospital Warfarin Doses & INR Results  Date INR Warfarin Dose   7/18/24 1.09 5 mg   7/19/24  1.15  5 mg     7/20/2024 1.24  5 mg     7/21/2024 1.38  5 mg                                Recent INRs:  Recent Labs     07/21/24  0321   INR 1.38*     Warfarin Discharge Instructions:   Date Warfarin Dose Lovenox    07/22/24 (tomorrow) 5 mg (1 tablet) 120 mg daily    07/23/24 5 mg (1 tablet) 120 mg daily     Next INR date: Call [facility] Coumadin clinic for an appt on 7/24/24

## 2024-07-22 ENCOUNTER — CARE COORDINATION (OUTPATIENT)
Dept: CASE MANAGEMENT | Age: 59
End: 2024-07-22

## 2024-07-22 ENCOUNTER — TELEPHONE (OUTPATIENT)
Dept: FAMILY MEDICINE CLINIC | Age: 59
End: 2024-07-22

## 2024-07-22 ENCOUNTER — APPOINTMENT (OUTPATIENT)
Dept: CT IMAGING | Age: 59
End: 2024-07-22
Payer: COMMERCIAL

## 2024-07-22 ENCOUNTER — APPOINTMENT (OUTPATIENT)
Dept: GENERAL RADIOLOGY | Age: 59
End: 2024-07-22
Payer: COMMERCIAL

## 2024-07-22 ENCOUNTER — HOSPITAL ENCOUNTER (INPATIENT)
Age: 59
LOS: 4 days | Discharge: HOME | End: 2024-07-27
Attending: EMERGENCY MEDICINE | Admitting: STUDENT IN AN ORGANIZED HEALTH CARE EDUCATION/TRAINING PROGRAM
Payer: COMMERCIAL

## 2024-07-22 DIAGNOSIS — R06.02 SHORTNESS OF BREATH: ICD-10-CM

## 2024-07-22 DIAGNOSIS — I50.23 ACUTE ON CHRONIC SYSTOLIC CHF (CONGESTIVE HEART FAILURE) (HCC): Primary | ICD-10-CM

## 2024-07-22 DIAGNOSIS — R60.9 PITTING EDEMA: ICD-10-CM

## 2024-07-22 DIAGNOSIS — I50.9 CONGESTIVE HEART FAILURE, UNSPECIFIED HF CHRONICITY, UNSPECIFIED HEART FAILURE TYPE (HCC): ICD-10-CM

## 2024-07-22 DIAGNOSIS — D64.9 SYMPTOMATIC ANEMIA: Primary | ICD-10-CM

## 2024-07-22 LAB
ALBUMIN SERPL BCG-MCNC: 3.9 G/DL (ref 3.5–5.1)
ALP SERPL-CCNC: 92 U/L (ref 38–126)
ALT SERPL W/O P-5'-P-CCNC: 11 U/L (ref 11–66)
AMMONIA PLAS-MCNC: 17 UMOL/L (ref 11–60)
ANION GAP SERPL CALC-SCNC: 12 MEQ/L (ref 8–16)
AST SERPL-CCNC: 17 U/L (ref 5–40)
BASOPHILS ABSOLUTE: 0 THOU/MM3 (ref 0–0.1)
BASOPHILS NFR BLD AUTO: 0.5 %
BILIRUB CONJ SERPL-MCNC: 0.2 MG/DL (ref 0.1–13.8)
BILIRUB SERPL-MCNC: 0.5 MG/DL (ref 0.3–1.2)
BUN SERPL-MCNC: 40 MG/DL (ref 7–22)
CALCIUM SERPL-MCNC: 8.4 MG/DL (ref 8.5–10.5)
CHLORIDE SERPL-SCNC: 105 MEQ/L (ref 98–111)
CO2 SERPL-SCNC: 22 MEQ/L (ref 23–33)
CREAT SERPL-MCNC: 3.5 MG/DL (ref 0.4–1.2)
DEPRECATED RDW RBC AUTO: 55.5 FL (ref 35–45)
ELLIPTOCYTES: ABNORMAL
EOSINOPHIL NFR BLD AUTO: 1.7 %
EOSINOPHILS ABSOLUTE: 0.1 THOU/MM3 (ref 0–0.4)
ERYTHROCYTE [DISTWIDTH] IN BLOOD BY AUTOMATED COUNT: 18.7 % (ref 11.5–14.5)
GFR SERPL CREATININE-BSD FRML MDRD: 19 ML/MIN/1.73M2
GLUCOSE SERPL-MCNC: 173 MG/DL (ref 70–108)
HCT VFR BLD AUTO: 27.7 % (ref 42–52)
HGB BLD-MCNC: 8 GM/DL (ref 14–18)
HYPOCHROMIA BLD QL SMEAR: PRESENT
IMM GRANULOCYTES # BLD AUTO: 0.04 THOU/MM3 (ref 0–0.07)
IMM GRANULOCYTES NFR BLD AUTO: 0.5 %
INR PPP: 1.77 (ref 0.85–1.13)
LIPASE SERPL-CCNC: 67.3 U/L (ref 5.6–51.3)
LYMPHOCYTES ABSOLUTE: 0.4 THOU/MM3 (ref 1–4.8)
LYMPHOCYTES NFR BLD AUTO: 4.6 %
MCH RBC QN AUTO: 25.8 PG (ref 26–33)
MCHC RBC AUTO-ENTMCNC: 28.9 GM/DL (ref 32.2–35.5)
MCV RBC AUTO: 89.4 FL (ref 80–94)
MONOCYTES ABSOLUTE: 0.7 THOU/MM3 (ref 0.4–1.3)
MONOCYTES NFR BLD AUTO: 8.8 %
NEUTROPHILS ABSOLUTE: 6.8 THOU/MM3 (ref 1.8–7.7)
NEUTROPHILS NFR BLD AUTO: 83.9 %
NRBC BLD AUTO-RTO: 0 /100 WBC
NT-PROBNP SERPL IA-MCNC: ABNORMAL PG/ML (ref 0–124)
OSMOLALITY SERPL CALC.SUM OF ELEC: 291.4 MOSMOL/KG (ref 275–300)
PLATELET # BLD AUTO: 134 THOU/MM3 (ref 130–400)
PMV BLD AUTO: 10.6 FL (ref 9.4–12.4)
POIKILOCYTES: ABNORMAL
POTASSIUM SERPL-SCNC: 4.3 MEQ/L (ref 3.5–5.2)
PROT SERPL-MCNC: 6.4 G/DL (ref 6.1–8)
RBC # BLD AUTO: 3.1 MILL/MM3 (ref 4.7–6.1)
SCAN OF BLOOD SMEAR: NORMAL
SODIUM SERPL-SCNC: 139 MEQ/L (ref 135–145)
TARGETS BLD QL SMEAR: ABNORMAL
TROPONIN, HIGH SENSITIVITY: 147 NG/L (ref 0–12)
WBC # BLD AUTO: 8.1 THOU/MM3 (ref 4.8–10.8)

## 2024-07-22 PROCEDURE — 72125 CT NECK SPINE W/O DYE: CPT

## 2024-07-22 PROCEDURE — 84484 ASSAY OF TROPONIN QUANT: CPT

## 2024-07-22 PROCEDURE — 93005 ELECTROCARDIOGRAM TRACING: CPT | Performed by: EMERGENCY MEDICINE

## 2024-07-22 PROCEDURE — 71045 X-RAY EXAM CHEST 1 VIEW: CPT

## 2024-07-22 PROCEDURE — 70450 CT HEAD/BRAIN W/O DYE: CPT

## 2024-07-22 PROCEDURE — 85610 PROTHROMBIN TIME: CPT

## 2024-07-22 PROCEDURE — 99285 EMERGENCY DEPT VISIT HI MDM: CPT

## 2024-07-22 PROCEDURE — 83690 ASSAY OF LIPASE: CPT

## 2024-07-22 PROCEDURE — 36415 COLL VENOUS BLD VENIPUNCTURE: CPT

## 2024-07-22 PROCEDURE — 80048 BASIC METABOLIC PNL TOTAL CA: CPT

## 2024-07-22 PROCEDURE — 83880 ASSAY OF NATRIURETIC PEPTIDE: CPT

## 2024-07-22 PROCEDURE — 96374 THER/PROPH/DIAG INJ IV PUSH: CPT

## 2024-07-22 PROCEDURE — 85025 COMPLETE CBC W/AUTO DIFF WBC: CPT

## 2024-07-22 PROCEDURE — 82140 ASSAY OF AMMONIA: CPT

## 2024-07-22 PROCEDURE — 6360000002 HC RX W HCPCS

## 2024-07-22 PROCEDURE — 80076 HEPATIC FUNCTION PANEL: CPT

## 2024-07-22 RX ORDER — DAPAGLIFLOZIN 5 MG/1
5 TABLET, FILM COATED ORAL EVERY MORNING
Qty: 90 TABLET | Refills: 3 | Status: SHIPPED | OUTPATIENT
Start: 2024-07-22

## 2024-07-22 RX ORDER — BUMETANIDE 0.25 MG/ML
2 INJECTION INTRAMUSCULAR; INTRAVENOUS ONCE
Status: DISCONTINUED | OUTPATIENT
Start: 2024-07-22 | End: 2024-07-22

## 2024-07-22 RX ORDER — BUMETANIDE 0.25 MG/ML
1 INJECTION INTRAMUSCULAR; INTRAVENOUS ONCE
Status: DISCONTINUED | OUTPATIENT
Start: 2024-07-22 | End: 2024-07-22

## 2024-07-22 RX ORDER — BUMETANIDE 0.25 MG/ML
2 INJECTION INTRAMUSCULAR; INTRAVENOUS ONCE
Status: COMPLETED | OUTPATIENT
Start: 2024-07-22 | End: 2024-07-22

## 2024-07-22 RX ADMIN — BUMETANIDE 2 MG: 0.25 INJECTION INTRAMUSCULAR; INTRAVENOUS at 22:24

## 2024-07-22 NOTE — CARE COORDINATION
Care Transitions Note    Initial Call - Call within 2 business days of discharge: Yes    Patient Current Location:  Home: [address] [place] [id number]    Care Transition Nurse contacted the patient by telephone to perform post hospital discharge assessment, verified name and  as identifiers. Provided introduction to self, and explanation of the Care Transition Nurse role.     Patient: Garrett Duncan    Patient : 1965   MRN: [id number]    Reason for Admission: Abnormal lab, Anemia  Discharge Date: 24  RURS: Readmission Risk Score: 32.8      Last Discharge Facility       Date Complaint Diagnosis Description Type Department Provider    24 Abnormal Lab Anemia, unspecified type ... ED to Hosp-Admission (Discharged) (ADMITTED) [facility] Juju Ramirez PA-C; Radha...            Was this an external facility discharge? No    Additional needs identified to be addressed with provider   No needs identified             Method of communication with provider: none.    Patients top risk factors for readmission: lack of knowledge about disease, level of motivation, medical condition-Symptomatic anemia, CHF, Chronic PE, CKD stg 3, HTN, multiple health system providers, and polypharmacy    Interventions to address risk factors:   Education: bleeding precautions, s/s of anemia, bowels  Review of patient management of conditions/medications:      Care Summary Note: Contacted pt for initial care transition follow up.  Pt was abrupt with his responses.  Garrett states that he is doing \"okay\".  No longer feeling tired or fatigue since receiving the blood transfusion.  Pt received 2 units PRBC.  Swelling in legs still present.  States no worse than it was in the hospital.  Encouraged to keep elevated, take Bumex 2 mg daily as prescribed, wear compression stockings.  Also encouraged to monitor diet-low sodium and fluid restriction.  Importance of monitoring weight daily and when to contact provider with a weight

## 2024-07-22 NOTE — TELEPHONE ENCOUNTER
Care Transitions Initial Follow Up Call    Outreach made within 2 business days of discharge: Yes    Patient: Garrett Duncan Patient : 1965   MRN: [id number]  Reason for Admission: There are no discharge diagnoses documented for the most recent discharge.  Discharge Date: 24       Spoke with: patient    Discharge department/facility: [facility]    TCM Interactive Patient Contact:  Was patient able to fill all prescriptions: Yes  Was patient instructed to bring all medications to the follow-up visit: Yes  Is patient taking all medications as directed in the discharge summary? Yes  Does patient understand their discharge instructions: Yes  Does patient have questions or concerns that need addressed prior to 7-14 day follow up office visit: no    Scheduled appointment with PCP within 7-14 days    Follow Up  Future Appointments   Date Time Provider Department Center   2024 10:30 AM Neelima Rueda APRN - CNP [facility]   2024 10:30 AM Leonel Adorno APRN - CNP [facility] [place]   2024  3:00 PM Samuel Kathleen MD N [facility] [place]   2024  9:30 AM Anika Askew APRN - CNP [facility] [place]   2024  3:30 PM STR PULMONARY FUNCTION ROOM 1 [facility] [place]   2024  3:30 PM Leonel Adorno APRN - CNP [facility] [place]   2024  3:30 PM Shelly Johnson MD N [facility] [place]   2025  1:45 PM Fany Guzmán MD N [facility] [place]       KHALIF SALINAS LPN

## 2024-07-23 ENCOUNTER — APPOINTMENT (OUTPATIENT)
Age: 59
End: 2024-07-23
Payer: COMMERCIAL

## 2024-07-23 LAB
ANION GAP SERPL CALC-SCNC: 14 MEQ/L (ref 8–16)
BUN SERPL-MCNC: 39 MG/DL (ref 7–22)
CALCIUM SERPL-MCNC: 8.7 MG/DL (ref 8.5–10.5)
CHLORIDE SERPL-SCNC: 106 MEQ/L (ref 98–111)
CO2 SERPL-SCNC: 20 MEQ/L (ref 23–33)
CREAT SERPL-MCNC: 3.3 MG/DL (ref 0.4–1.2)
DEPRECATED RDW RBC AUTO: 54.7 FL (ref 35–45)
ECHO AV CUSP MM: 2.2 CM
ECHO BSA: 2.56 M2
ECHO LA AREA 2C: 30.8 CM2
ECHO LA AREA 4C: 30.2 CM2
ECHO LA DIAMETER INDEX: 1.9 CM/M2
ECHO LA DIAMETER: 4.7 CM
ECHO LA MAJOR AXIS: 6.4 CM
ECHO LA MINOR AXIS: 7.1 CM
ECHO LA VOL BP: 119 ML (ref 18–58)
ECHO LA VOL MOD A2C: 110 ML (ref 18–58)
ECHO LA VOL MOD A4C: 116 ML (ref 18–58)
ECHO LA VOL/BSA BIPLANE: 48 ML/M2 (ref 16–34)
ECHO LA VOLUME INDEX MOD A2C: 44 ML/M2 (ref 16–34)
ECHO LA VOLUME INDEX MOD A4C: 47 ML/M2 (ref 16–34)
ECHO LV EDV A2C: 285 ML
ECHO LV EDV A4C: 245 ML
ECHO LV EDV INDEX A4C: 99 ML/M2
ECHO LV EDV NDEX A2C: 115 ML/M2
ECHO LV EJECTION FRACTION A2C: 29 %
ECHO LV EJECTION FRACTION A4C: 21 %
ECHO LV EJECTION FRACTION BIPLANE: 26 % (ref 55–100)
ECHO LV ESV A2C: 204 ML
ECHO LV ESV A4C: 193 ML
ECHO LV ESV INDEX A2C: 82 ML/M2
ECHO LV ESV INDEX A4C: 78 ML/M2
ECHO LV FRACTIONAL SHORTENING: 9 % (ref 28–44)
ECHO LV INTERNAL DIMENSION DIASTOLE INDEX: 2.7 CM/M2
ECHO LV INTERNAL DIMENSION DIASTOLIC: 6.7 CM (ref 4.2–5.9)
ECHO LV INTERNAL DIMENSION SYSTOLIC INDEX: 2.46 CM/M2
ECHO LV INTERNAL DIMENSION SYSTOLIC: 6.1 CM
ECHO LV IVSD: 1 CM (ref 0.6–1)
ECHO LV MASS 2D: 298.2 G (ref 88–224)
ECHO LV MASS INDEX 2D: 120.3 G/M2 (ref 49–115)
ECHO LV POSTERIOR WALL DIASTOLIC: 1 CM (ref 0.6–1)
ECHO LV RELATIVE WALL THICKNESS RATIO: 0.3
ECHO RV INTERNAL DIMENSION: 5 CM
ECHO RV TAPSE: 2 CM (ref 1.7–?)
EKG ATRIAL RATE: 69 BPM
EKG P AXIS: 84 DEGREES
EKG P-R INTERVAL: 206 MS
EKG Q-T INTERVAL: 432 MS
EKG QRS DURATION: 96 MS
EKG QTC CALCULATION (BAZETT): 462 MS
EKG R AXIS: 8 DEGREES
EKG T AXIS: 109 DEGREES
EKG VENTRICULAR RATE: 69 BPM
ERYTHROCYTE [DISTWIDTH] IN BLOOD BY AUTOMATED COUNT: 19.2 % (ref 11.5–14.5)
GFR SERPL CREATININE-BSD FRML MDRD: 21 ML/MIN/1.73M2
GLUCOSE BLD STRIP.AUTO-MCNC: 125 MG/DL (ref 70–108)
GLUCOSE BLD STRIP.AUTO-MCNC: 200 MG/DL (ref 70–108)
GLUCOSE BLD STRIP.AUTO-MCNC: 224 MG/DL (ref 70–108)
GLUCOSE BLD STRIP.AUTO-MCNC: 303 MG/DL (ref 70–108)
GLUCOSE SERPL-MCNC: 129 MG/DL (ref 70–108)
HCT VFR BLD AUTO: 28.3 % (ref 42–52)
HGB BLD-MCNC: 8.1 GM/DL (ref 14–18)
INR PPP: 1.82 (ref 0.85–1.13)
MAGNESIUM SERPL-MCNC: 2.3 MG/DL (ref 1.6–2.4)
MCH RBC QN AUTO: 25.6 PG (ref 26–33)
MCHC RBC AUTO-ENTMCNC: 28.6 GM/DL (ref 32.2–35.5)
MCV RBC AUTO: 89.3 FL (ref 80–94)
OSMOLALITY SERPL CALC.SUM OF ELEC: 290.5 MOSMOL/KG (ref 275–300)
PHOSPHATE SERPL-MCNC: 3 MG/DL (ref 2.4–4.7)
PLATELET # BLD AUTO: 141 THOU/MM3 (ref 130–400)
PMV BLD AUTO: 10.4 FL (ref 9.4–12.4)
POTASSIUM SERPL-SCNC: 4.3 MEQ/L (ref 3.5–5.2)
RBC # BLD AUTO: 3.17 MILL/MM3 (ref 4.7–6.1)
SODIUM SERPL-SCNC: 140 MEQ/L (ref 135–145)
TROPONIN, HIGH SENSITIVITY: 165 NG/L (ref 0–12)
WBC # BLD AUTO: 7.7 THOU/MM3 (ref 4.8–10.8)

## 2024-07-23 PROCEDURE — 6370000000 HC RX 637 (ALT 250 FOR IP)

## 2024-07-23 PROCEDURE — 93307 TTE W/O DOPPLER COMPLETE: CPT

## 2024-07-23 PROCEDURE — 99222 1ST HOSP IP/OBS MODERATE 55: CPT | Performed by: INTERNAL MEDICINE

## 2024-07-23 PROCEDURE — 1200000003 HC TELEMETRY R&B

## 2024-07-23 PROCEDURE — 36415 COLL VENOUS BLD VENIPUNCTURE: CPT

## 2024-07-23 PROCEDURE — 97530 THERAPEUTIC ACTIVITIES: CPT

## 2024-07-23 PROCEDURE — 84484 ASSAY OF TROPONIN QUANT: CPT

## 2024-07-23 PROCEDURE — 97165 OT EVAL LOW COMPLEX 30 MIN: CPT

## 2024-07-23 PROCEDURE — 82948 REAGENT STRIP/BLOOD GLUCOSE: CPT

## 2024-07-23 PROCEDURE — 6360000002 HC RX W HCPCS

## 2024-07-23 PROCEDURE — 80048 BASIC METABOLIC PNL TOTAL CA: CPT

## 2024-07-23 PROCEDURE — 97116 GAIT TRAINING THERAPY: CPT

## 2024-07-23 PROCEDURE — 97161 PT EVAL LOW COMPLEX 20 MIN: CPT

## 2024-07-23 PROCEDURE — 83735 ASSAY OF MAGNESIUM: CPT

## 2024-07-23 PROCEDURE — 85027 COMPLETE CBC AUTOMATED: CPT

## 2024-07-23 PROCEDURE — 93307 TTE W/O DOPPLER COMPLETE: CPT | Performed by: INTERNAL MEDICINE

## 2024-07-23 PROCEDURE — 84100 ASSAY OF PHOSPHORUS: CPT

## 2024-07-23 PROCEDURE — 1200000000 HC SEMI PRIVATE

## 2024-07-23 PROCEDURE — 93010 ELECTROCARDIOGRAM REPORT: CPT | Performed by: INTERNAL MEDICINE

## 2024-07-23 PROCEDURE — 2580000003 HC RX 258

## 2024-07-23 PROCEDURE — 85610 PROTHROMBIN TIME: CPT

## 2024-07-23 RX ORDER — ATORVASTATIN CALCIUM 40 MG/1
40 TABLET, FILM COATED ORAL NIGHTLY
Status: DISCONTINUED | OUTPATIENT
Start: 2024-07-23 | End: 2024-07-27 | Stop reason: HOSPADM

## 2024-07-23 RX ORDER — POLYETHYLENE GLYCOL 3350 17 G/17G
17 POWDER, FOR SOLUTION ORAL DAILY PRN
Status: DISCONTINUED | OUTPATIENT
Start: 2024-07-23 | End: 2024-07-27 | Stop reason: HOSPADM

## 2024-07-23 RX ORDER — POTASSIUM CHLORIDE 7.45 MG/ML
10 INJECTION INTRAVENOUS PRN
Status: DISCONTINUED | OUTPATIENT
Start: 2024-07-23 | End: 2024-07-27 | Stop reason: HOSPADM

## 2024-07-23 RX ORDER — HYDRALAZINE HYDROCHLORIDE 50 MG/1
100 TABLET, FILM COATED ORAL 3 TIMES DAILY
Status: DISCONTINUED | OUTPATIENT
Start: 2024-07-23 | End: 2024-07-27 | Stop reason: HOSPADM

## 2024-07-23 RX ORDER — ENOXAPARIN SODIUM 150 MG/ML
1 INJECTION SUBCUTANEOUS DAILY
Status: DISCONTINUED | OUTPATIENT
Start: 2024-07-23 | End: 2024-07-27 | Stop reason: HOSPADM

## 2024-07-23 RX ORDER — MEXILETINE HYDROCHLORIDE 150 MG/1
300 CAPSULE ORAL EVERY 8 HOURS SCHEDULED
Status: DISCONTINUED | OUTPATIENT
Start: 2024-07-23 | End: 2024-07-23

## 2024-07-23 RX ORDER — ISOSORBIDE MONONITRATE 60 MG/1
120 TABLET, EXTENDED RELEASE ORAL DAILY
Status: DISCONTINUED | OUTPATIENT
Start: 2024-07-23 | End: 2024-07-27 | Stop reason: HOSPADM

## 2024-07-23 RX ORDER — HYDROCODONE BITARTRATE AND ACETAMINOPHEN 5; 325 MG/1; MG/1
1 TABLET ORAL EVERY 4 HOURS PRN
Status: DISCONTINUED | OUTPATIENT
Start: 2024-07-23 | End: 2024-07-27 | Stop reason: HOSPADM

## 2024-07-23 RX ORDER — ALPRAZOLAM 0.5 MG/1
0.5 TABLET ORAL NIGHTLY
Status: DISCONTINUED | OUTPATIENT
Start: 2024-07-23 | End: 2024-07-27 | Stop reason: HOSPADM

## 2024-07-23 RX ORDER — INSULIN LISPRO 100 [IU]/ML
0-4 INJECTION, SOLUTION INTRAVENOUS; SUBCUTANEOUS NIGHTLY
Status: DISCONTINUED | OUTPATIENT
Start: 2024-07-23 | End: 2024-07-27 | Stop reason: HOSPADM

## 2024-07-23 RX ORDER — HYDROCODONE BITARTRATE AND ACETAMINOPHEN 5; 325 MG/1; MG/1
2 TABLET ORAL EVERY 4 HOURS PRN
Status: DISCONTINUED | OUTPATIENT
Start: 2024-07-23 | End: 2024-07-27 | Stop reason: HOSPADM

## 2024-07-23 RX ORDER — INSULIN GLARGINE 100 [IU]/ML
5 INJECTION, SOLUTION SUBCUTANEOUS NIGHTLY
Status: DISCONTINUED | OUTPATIENT
Start: 2024-07-23 | End: 2024-07-27 | Stop reason: HOSPADM

## 2024-07-23 RX ORDER — WARFARIN SODIUM 5 MG/1
5 TABLET ORAL DAILY
Status: DISCONTINUED | OUTPATIENT
Start: 2024-07-23 | End: 2024-07-24 | Stop reason: ALTCHOICE

## 2024-07-23 RX ORDER — POTASSIUM CHLORIDE 20 MEQ/1
40 TABLET, EXTENDED RELEASE ORAL PRN
Status: DISCONTINUED | OUTPATIENT
Start: 2024-07-23 | End: 2024-07-27 | Stop reason: HOSPADM

## 2024-07-23 RX ORDER — MAGNESIUM SULFATE IN WATER 40 MG/ML
2000 INJECTION, SOLUTION INTRAVENOUS PRN
Status: DISCONTINUED | OUTPATIENT
Start: 2024-07-23 | End: 2024-07-27 | Stop reason: HOSPADM

## 2024-07-23 RX ORDER — DEXTROSE MONOHYDRATE 100 MG/ML
INJECTION, SOLUTION INTRAVENOUS CONTINUOUS PRN
Status: DISCONTINUED | OUTPATIENT
Start: 2024-07-23 | End: 2024-07-27 | Stop reason: HOSPADM

## 2024-07-23 RX ORDER — SODIUM CHLORIDE 0.9 % (FLUSH) 0.9 %
5-40 SYRINGE (ML) INJECTION PRN
Status: DISCONTINUED | OUTPATIENT
Start: 2024-07-23 | End: 2024-07-27 | Stop reason: HOSPADM

## 2024-07-23 RX ORDER — ACETAMINOPHEN 650 MG/1
650 SUPPOSITORY RECTAL EVERY 6 HOURS PRN
Status: DISCONTINUED | OUTPATIENT
Start: 2024-07-23 | End: 2024-07-27 | Stop reason: HOSPADM

## 2024-07-23 RX ORDER — ONDANSETRON 4 MG/1
4 TABLET, ORALLY DISINTEGRATING ORAL EVERY 8 HOURS PRN
Status: DISCONTINUED | OUTPATIENT
Start: 2024-07-23 | End: 2024-07-27 | Stop reason: HOSPADM

## 2024-07-23 RX ORDER — METOPROLOL SUCCINATE 100 MG/1
100 TABLET, EXTENDED RELEASE ORAL DAILY
Status: DISCONTINUED | OUTPATIENT
Start: 2024-07-23 | End: 2024-07-27 | Stop reason: HOSPADM

## 2024-07-23 RX ORDER — SODIUM CHLORIDE 0.9 % (FLUSH) 0.9 %
5-40 SYRINGE (ML) INJECTION EVERY 12 HOURS SCHEDULED
Status: DISCONTINUED | OUTPATIENT
Start: 2024-07-23 | End: 2024-07-27 | Stop reason: HOSPADM

## 2024-07-23 RX ORDER — ACETAMINOPHEN 325 MG/1
650 TABLET ORAL EVERY 6 HOURS PRN
Status: DISCONTINUED | OUTPATIENT
Start: 2024-07-23 | End: 2024-07-27 | Stop reason: HOSPADM

## 2024-07-23 RX ORDER — LIDOCAINE 40 MG/G
CREAM TOPICAL DAILY
Status: DISCONTINUED | OUTPATIENT
Start: 2024-07-23 | End: 2024-07-23

## 2024-07-23 RX ORDER — BUMETANIDE 0.25 MG/ML
2 INJECTION INTRAMUSCULAR; INTRAVENOUS 2 TIMES DAILY
Status: DISCONTINUED | OUTPATIENT
Start: 2024-07-23 | End: 2024-07-24

## 2024-07-23 RX ORDER — ONDANSETRON 2 MG/ML
4 INJECTION INTRAMUSCULAR; INTRAVENOUS EVERY 6 HOURS PRN
Status: DISCONTINUED | OUTPATIENT
Start: 2024-07-23 | End: 2024-07-27 | Stop reason: HOSPADM

## 2024-07-23 RX ORDER — AMIODARONE HYDROCHLORIDE 200 MG/1
200 TABLET ORAL DAILY
Status: DISCONTINUED | OUTPATIENT
Start: 2024-07-23 | End: 2024-07-27 | Stop reason: HOSPADM

## 2024-07-23 RX ORDER — GLUCAGON 1 MG/ML
1 KIT INJECTION PRN
Status: DISCONTINUED | OUTPATIENT
Start: 2024-07-23 | End: 2024-07-27 | Stop reason: HOSPADM

## 2024-07-23 RX ORDER — SODIUM CHLORIDE 9 MG/ML
INJECTION, SOLUTION INTRAVENOUS PRN
Status: DISCONTINUED | OUTPATIENT
Start: 2024-07-23 | End: 2024-07-27 | Stop reason: HOSPADM

## 2024-07-23 RX ORDER — INSULIN LISPRO 100 [IU]/ML
0-4 INJECTION, SOLUTION INTRAVENOUS; SUBCUTANEOUS
Status: DISCONTINUED | OUTPATIENT
Start: 2024-07-23 | End: 2024-07-27 | Stop reason: HOSPADM

## 2024-07-23 RX ORDER — LANOLIN ALCOHOL/MO/W.PET/CERES
3 CREAM (GRAM) TOPICAL NIGHTLY PRN
Status: DISCONTINUED | OUTPATIENT
Start: 2024-07-23 | End: 2024-07-27 | Stop reason: HOSPADM

## 2024-07-23 RX ORDER — HYDROCODONE BITARTRATE AND ACETAMINOPHEN 5; 325 MG/1; MG/1
1 TABLET ORAL EVERY 6 HOURS PRN
Status: DISCONTINUED | OUTPATIENT
Start: 2024-07-23 | End: 2024-07-23

## 2024-07-23 RX ADMIN — ALPRAZOLAM 0.5 MG: 0.5 TABLET ORAL at 21:18

## 2024-07-23 RX ADMIN — ATORVASTATIN CALCIUM 40 MG: 40 TABLET, FILM COATED ORAL at 23:18

## 2024-07-23 RX ADMIN — METOPROLOL SUCCINATE 100 MG: 100 TABLET, EXTENDED RELEASE ORAL at 09:00

## 2024-07-23 RX ADMIN — BUMETANIDE 2 MG: 0.25 INJECTION INTRAMUSCULAR; INTRAVENOUS at 17:09

## 2024-07-23 RX ADMIN — INSULIN GLARGINE 5 UNITS: 100 INJECTION, SOLUTION SUBCUTANEOUS at 23:17

## 2024-07-23 RX ADMIN — HYDROCODONE BITARTRATE AND ACETAMINOPHEN 2 TABLET: 5; 325 TABLET ORAL at 08:19

## 2024-07-23 RX ADMIN — HYDRALAZINE HYDROCHLORIDE 100 MG: 50 TABLET ORAL at 21:19

## 2024-07-23 RX ADMIN — TICAGRELOR 90 MG: 90 TABLET ORAL at 23:18

## 2024-07-23 RX ADMIN — ISOSORBIDE MONONITRATE 120 MG: 60 TABLET, EXTENDED RELEASE ORAL at 09:00

## 2024-07-23 RX ADMIN — SODIUM CHLORIDE, PRESERVATIVE FREE 10 ML: 5 INJECTION INTRAVENOUS at 21:19

## 2024-07-23 RX ADMIN — INSULIN LISPRO 1 UNITS: 100 INJECTION, SOLUTION INTRAVENOUS; SUBCUTANEOUS at 17:09

## 2024-07-23 RX ADMIN — SODIUM CHLORIDE, PRESERVATIVE FREE 10 ML: 5 INJECTION INTRAVENOUS at 08:20

## 2024-07-23 RX ADMIN — HYDRALAZINE HYDROCHLORIDE 100 MG: 50 TABLET ORAL at 13:10

## 2024-07-23 RX ADMIN — HYDROCODONE BITARTRATE AND ACETAMINOPHEN 2 TABLET: 5; 325 TABLET ORAL at 03:48

## 2024-07-23 RX ADMIN — INSULIN LISPRO 1 UNITS: 100 INJECTION, SOLUTION INTRAVENOUS; SUBCUTANEOUS at 12:02

## 2024-07-23 RX ADMIN — HYDROCODONE BITARTRATE AND ACETAMINOPHEN 2 TABLET: 5; 325 TABLET ORAL at 17:09

## 2024-07-23 RX ADMIN — HYDRALAZINE HYDROCHLORIDE 100 MG: 50 TABLET ORAL at 09:00

## 2024-07-23 RX ADMIN — INSULIN LISPRO 4 UNITS: 100 INJECTION, SOLUTION INTRAVENOUS; SUBCUTANEOUS at 23:17

## 2024-07-23 RX ADMIN — HYDROCODONE BITARTRATE AND ACETAMINOPHEN 2 TABLET: 5; 325 TABLET ORAL at 21:18

## 2024-07-23 RX ADMIN — TICAGRELOR 90 MG: 90 TABLET ORAL at 08:19

## 2024-07-23 RX ADMIN — WARFARIN SODIUM 5 MG: 5 TABLET ORAL at 17:09

## 2024-07-23 RX ADMIN — ENOXAPARIN SODIUM 120 MG: 150 INJECTION SUBCUTANEOUS at 10:06

## 2024-07-23 RX ADMIN — AMIODARONE HYDROCHLORIDE 200 MG: 200 TABLET ORAL at 09:00

## 2024-07-23 RX ADMIN — BUMETANIDE 2 MG: 0.25 INJECTION INTRAMUSCULAR; INTRAVENOUS at 08:19

## 2024-07-23 ASSESSMENT — PAIN DESCRIPTION - DESCRIPTORS
DESCRIPTORS: ACHING;SHARP;THROBBING
DESCRIPTORS: ACHING;SHARP;THROBBING
DESCRIPTORS: ACHING;SHARP
DESCRIPTORS: ACHING
DESCRIPTORS: ACHING

## 2024-07-23 ASSESSMENT — PAIN DESCRIPTION - ORIENTATION
ORIENTATION: MID;LOWER
ORIENTATION: LOWER;MID
ORIENTATION: MID
ORIENTATION: LOWER;MID

## 2024-07-23 ASSESSMENT — PAIN DESCRIPTION - ONSET
ONSET: ON-GOING

## 2024-07-23 ASSESSMENT — PAIN SCALES - GENERAL
PAINLEVEL_OUTOF10: 8
PAINLEVEL_OUTOF10: 7
PAINLEVEL_OUTOF10: 8
PAINLEVEL_OUTOF10: 8
PAINLEVEL_OUTOF10: 7
PAINLEVEL_OUTOF10: 8

## 2024-07-23 ASSESSMENT — PAIN - FUNCTIONAL ASSESSMENT
PAIN_FUNCTIONAL_ASSESSMENT: NONE - DENIES PAIN
PAIN_FUNCTIONAL_ASSESSMENT: PREVENTS OR INTERFERES SOME ACTIVE ACTIVITIES AND ADLS

## 2024-07-23 ASSESSMENT — PAIN DESCRIPTION - FREQUENCY
FREQUENCY: CONTINUOUS

## 2024-07-23 ASSESSMENT — PAIN DESCRIPTION - LOCATION
LOCATION: BACK

## 2024-07-23 ASSESSMENT — PAIN DESCRIPTION - PAIN TYPE
TYPE: CHRONIC PAIN

## 2024-07-23 ASSESSMENT — PAIN DESCRIPTION - DIRECTION: RADIATING_TOWARDS: BILAT LEGS

## 2024-07-23 NOTE — ED NOTES
Pt standing in room upon arrival. Pt reports he will not stay in the bed. Pt made aware that he has a fall within the last 24 hours. Pt states he is fine. Pt reports he is more comfortable up. Pt aware he is a fall risk. Pt states \"I dont care\". Will monitor

## 2024-07-23 NOTE — ED NOTES
Report received from Benny RIVER. Pt and VS reassessed. Pt sitting on side of cot with call light in reach. Respirations even and unlabored. Pt denying any current needs at this time.

## 2024-07-23 NOTE — CARE COORDINATION
Case Management Assessment Initial Evaluation    Date/Time of Evaluation: 2024 8:50 AM  Assessment Completed by: Juju Cortez RN    If patient is discharged prior to next notation, then this note serves as note for discharge by case management.    Patient Name: Garrett Duncan                   YOB: 1965  Diagnosis: Shortness of breath [R06.02]  Pitting edema [R60.9]  Acute on chronic systolic CHF (congestive heart failure) ([facility]) [I50.23]  HFrEF (heart failure with reduced ejection fraction) ([facility]) [I50.20]  Congestive heart failure, unspecified HF chronicity, unspecified heart failure type ([facility]) [I50.9]                   Date / Time: 2024  7:40 PM  [facility]     Patient Admission Status: Inpatient   Readmission Risk Low 0-14, Mod 15-19), High > 20: Readmission Risk Score: 39.4    Current PCP: Leonel Adorno, APRN - CNP    Additional Case Management Notes: admit from ER with lower extremity edema. Therapy ordered.   BNP 48455.   Creat 3.3.  IV Bumex 2 mg BID.     Procedures:   Limited ECHO: ordered    Imagin/22 CXR: Enlarged cardiac silhouette with prominence to the central pulmonary vasculature and bilateral perihilar opacities suggesting volume overload/pulmonary edema    CT C Spine WO: 1. Evaluation of C5 through T1 is limited by motion however no acute cervical spine fracture identified. Subtle fracture could potentially be missed. If there is continued clinical concern for cervical spine fracture in the lower cervical spine then repeat CT scan could be performed when patient is able to stay still   2. Multilevel degenerative disease of the cervical spine    CT Head WO: 1. No acute intracranial hemorrhage.   2. Cerebral volume loss.     Patient Goals/Plan/Treatment Preferences: From his apartment alone. Denies needs at this time. Just wants to be left alone. Denies any ideas or needs for services at discharge.   States he does have a scale at home, but doesn't

## 2024-07-23 NOTE — ED PROVIDER NOTES
[facility] EMERGENCY DEPT  EMERGENCY DEPARTMENT ENCOUNTER          Pt Name: Garrett Duncan  MRN: [id number]  Birthdate 1965  Date of evaluation: 7/22/2024  Physician: Titi Wild MD  Supervising Attending Physician: Serge Gunn DO       CHIEF COMPLAINT       Chief Complaint   Patient presents with    Edema    Shortness of Breath         HISTORY OF PRESENT ILLNESS    HPI  Garrett Duncan is a 59 y.o. male with PMH significant for  HFrEF s/p Medtronic dual ICD placement, CAD s/p CABG, CKD, DMT2, h/o cocaine abuse, HTN, PAF who presents to the emergency department from home, by private vehicle for evaluation of worsening SOB and pitting edema to waist.  Patient states that during recent hospitalization (7/18-7/21), he was not following the prior recommended limit on fluid intake and noticed that his legs started to swell just prior to discharge.  Overnight BLE pitting edema significantly increased up to waist with worsening SOB.  States that he attempted to take his Bumex 2mg and this did not help much with the swelling or abdominal pressure.  States that the orthopnea was so bad that he attempted to sleep while standing and fell, hitting his left occipital region on the floor.  Denies any LOC.  Currently denies headache, neck pain, or back pain.    Of note, patient was discharged yesterday after 3-day hospitalization due to symptomatic anemia with 2U PRBC in total given during hospitalization with unknown cause of bleed.    The patient has no other acute complaints at this time.      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  Case discussed with specialties other than Emergency Medicine: Hospitalist      MEDICAL DECISION MAKING   Initial plan:  CBC, BMP, lipase, LFTs, PT/INR  Ammonia  Troponin, BNP  CXR  EKG  CT head and C-spine  Bumex IV 2mg    Comorbid conditions pertinent to this ED encounter:  HFrEF s/p Medtronic dual ICD placement, CAD s/p CABG, CKD, DMT2, h/o EtOH and cocaine abuse, HTN, PAF      Differential Diagnosis includes but is not limited to:  Acute on chronic CHF, PNA, COPD exacerbation, asthma, pneumothorax, anaphylaxis, anxiety, PE , pericardial effusion, ACS/MI, atelectasis, lower airway obstruction, aspiration    Decision Rules/Clinical Scores utilized:   Not applicable    Code Status:  Full code: Discussed with patient/family, at this moment they elect Full Code    Social determinants of health impacting treatment or disposition:  Substance abuse    MIPS documentation:  N/A    Medical Decision Making  59M with PMH significant for HFrEF s/p Medtronic dual ICD placement, CAD s/p CABG, CKD, DMT2, h/o EtOH and cocaine abuse, HTN, PAF who presents to the ED for evaluation of worsening SOB and pitting edema to the waist.  Patient states that during recent hospitalization (7/18-7/21), he was not following a prior recommended limit on fluid intake and noticed that his legs started to swell just prior to discharge.  Overnight BLE pitting edema significantly increased up to the waist with worsening SOB.  States that he attempted to take his Bumex 2mg and this did not help much with the swelling or abdominal pressure.  States that the orthopnea was \"so bad\" that he attempted to sleep while standing and ultimately fell, hitting his left occipital region on the floor without LOC.  Vitals stable.  Physical exam reveals pitting edema up to the waist B/L.  BNP significantly elevated from prior.  Given Bumex 2mg while in the ED.  Results discussed with patient. Patient verbalized understanding and agreement

## 2024-07-23 NOTE — ED NOTES
Called [facility] and spoke with La who approved patient transport to [facility]. Patient is in stable condition.

## 2024-07-23 NOTE — PLAN OF CARE
Problem: Discharge Planning  Goal: Discharge to home or other facility with appropriate resources  Outcome: Progressing  Flowsheets (Taken 7/23/2024 0959)  Discharge to home or other facility with appropriate resources:   Identify barriers to discharge with patient and caregiver   Identify discharge learning needs (meds, wound care, etc)   Arrange for needed discharge resources and transportation as appropriate     Problem: Pain  Goal: Verbalizes/displays adequate comfort level or baseline comfort level  Outcome: Progressing  Flowsheets (Taken 7/23/2024 0959)  Verbalizes/displays adequate comfort level or baseline comfort level:   Encourage patient to monitor pain and request assistance   Administer analgesics based on type and severity of pain and evaluate response   Assess pain using appropriate pain scale   Implement non-pharmacological measures as appropriate and evaluate response     Problem: ABCDS Injury Assessment  Goal: Absence of physical injury  Outcome: Progressing     Problem: Safety - Adult  Goal: Free from fall injury  Outcome: Progressing  Flowsheets (Taken 7/23/2024 0959)  Free From Fall Injury: Instruct family/caregiver on patient safety     Problem: Chronic Conditions and Co-morbidities  Goal: Patient's chronic conditions and co-morbidity symptoms are monitored and maintained or improved  Outcome: Progressing  Flowsheets (Taken 7/23/2024 0959)  Care Plan - Patient's Chronic Conditions and Co-Morbidity Symptoms are Monitored and Maintained or Improved:   Collaborate with multidisciplinary team to address chronic and comorbid conditions and prevent exacerbation or deterioration   Monitor and assess patient's chronic conditions and comorbid symptoms for stability, deterioration, or improvement

## 2024-07-23 NOTE — ED NOTES
Pt transported to [facility] in stable condition. Floor called prior to transport.       Padmini Parry  11/29/23 2549 The patient is a 34y Female complaining of code: sepsis (adult).

## 2024-07-23 NOTE — PLAN OF CARE
I have this patient early in a.m. and I agree with care from Dr. Corral.    Brief chart review patient appears to been discharged in the hospital July 21 he appears to have had a recent history of symptomatic anemia and given 2 units of PRBC he has been admitted on this hospital stay with definitive signs of acute systolic heart failure pulm edema and fluid overload he has responded well to initial doses of IV diuretic given in ER and on the floor with symptomatic improvement in his lower extremity swelling.    He appears to have chronic kidney disease his creatinine is 3.3 require further chart review to see what his baseline is he has had an echocardiogram performed to reassess his EF, review of his ECG shows sinus rhythm with PVCs    Physical survey does indicate bilateral crackles during lung auscultation at the base of his lungs although he appears to be on room air and has no accessory muscle use    Patient has had a CT of the head that is negative for any intracranial hemorrhage he accidentally hit his head on a counter prior to admission I have palpated his cervical area he has no cervical neck tenderness to palpation     In summary he is admitted with acute systolic heart failure and volume overload I will continue IV diuretic  Pulmonary edema with ischemic cardiomyopathy  8 beat run of V. tach he has had ablation in 2014  Elevated cardiac enzyme with a high sensitive troponin 147 likely secondary in recent past to symptomatic anemia and demand ischemia I do not suspect active ACS he has no chest pain  Tobacco use  Subtherapeutic INR  Paroxysmal atrial fibrillation with a current sinus rhythm palpated and auscultated at bedside    Ultimate goal of care is diuresis and treatment of pulmonary edema secondary to acute systolic heart failure and follow-up of echocardiogram, this note will not be billed for since history and physical dated for today has been billed for ready.

## 2024-07-23 NOTE — ED NOTES
Pt and VS reassessed at this time. Pt sitting on edge of cot with call light in reach. Respirations even and unlabored. Pt denying any current needs at this time.

## 2024-07-23 NOTE — PROGRESS NOTES
[facility]  INPATIENT OCCUPATIONAL THERAPY  STRZ MED SURG 8AB  EVALUATION    Time:   Time In: 1010  Time Out: 1028  Timed Code Treatment Minutes: 8 Minutes  Minutes: 18          Date: 2024  Patient Name: Garrett Duncan,   Gender: male      MRN: [id number]  : 1965  (59 y.o.)  Referring Practitioner: Gricelda Corral MD  Diagnosis: HFrEF  Additional Pertinent Hx: per chart review; 59 y.o. male with PMHx of HTN, HLD, chronic back pain, DM2, CAD s/p CABG, CKD stage IV, PAF on Coumadin, chronic anemia, CHF s/p AICD  who presents to [facility] for evaluation of lower extremity edema and fall.  On , patient had planned lower back surgical procedure with Dr. Waller, he was noted to have low hemoglobin and was sent to hematology office.  At hematology office he was recommended to come into ED for further evaluation.  Patient was admitted for anemia and given 2 units PRBC as well as IV iron.  He was discharged on 2024.  Patient states that during hospitalization his lower extremity edema kept increasing, at home symptoms did not improve, increased his use of diuretics but that did not help.  On day of arrival patient noted significant LE edema, had PND and WEATHERS.  Patient was standing at countertop when he closes eyes due to fatigue, he panicked as he felt like he was drowning and could not breathe right, fell down and hit his left occipital lobe region on head.  Upon arrival to ED, CXR showed significant pulmonary vascular congestion and cardiomegaly, proBNP was elevated from previous admission.  Patient was given Bumex 2 mg IV and admitted to hospital services for further management.    Restrictions/Precautions:  Restrictions/Precautions: General Precautions    Subjective  Chart Reviewed: Yes, Orders, Progress Notes, History and Physical, Labs  Patient assessed for rehabilitation services?: Yes  Family / Caregiver Present: No    Subjective: RN approved session, patient seated

## 2024-07-23 NOTE — PROGRESS NOTES
Warfarin Pharmacy Consult Note    Garrett Duncan is a 59 y.o. male for whom pharmacy has been asked to manage warfarin therapy.     Consulting Provider: Dr. Corral  Warfarin Indication: Atrial fibrillation or Atrial flutter  Target INR range: 2.0-3.0   Warfarin dose prior to admission: 2.5mg MF, 5mg all other days  Outpatient warfarin provider: [facility]    Recent Labs     07/21/24  0321 07/22/24  2226 07/23/24  0621   INR 1.38* 1.77* 1.82*     Recent Labs     07/21/24  0321 07/22/24  2110 07/23/24  0621   HGB 7.5* 8.0* 8.1*   PLT  --  134 141     Concurrent anticoagulants/antiplatelets: Lovenox, Brilinta  Significant warfarin drug-drug interactions: Amiodarone    Date INR Warfarin Dose   7/23/24 1.82 5 mg                                   INR will be monitored routinely until therapeutic INR is achieved.    Pharmacy will continue to follow. Thank you for the consult,   Amaya Villavicencio [facility]  7/23/2024 9:06 AM

## 2024-07-23 NOTE — ED NOTES
ED nurse-to-nurse bedside report    Chief Complaint   Patient presents with    Edema    Shortness of Breath      LOC: alert and orientated to name, place, date  Vital signs   Vitals:    07/22/24 2108 07/22/24 2224 07/22/24 2307 07/23/24 0009   BP: (!) 184/88 (!) 158/96 (!) 154/89 (!) 169/80   Pulse: 70 68 67 71   Resp: 18 14 18 18   Temp:       SpO2: 95% 93% 96% 96%      Pain:  0  Pain Interventions: N/A  Pain Goal: 0  Oxygen: No    Current needs required RA   Telemetry: Yes  LDAs:   Peripheral IV 07/18/24 Distal;Left;Anterior Cephalic (Active)   Site Assessment Clean, dry & intact 07/23/24 0009   Line Status Normal saline locked 07/23/24 0009   Line Care Connections checked and tightened 07/23/24 0009   Phlebitis Assessment No symptoms 07/23/24 0009   Infiltration Assessment 0 07/23/24 0009     Continuous Infusions:   Mobility: Requires assistance * 1  Ch Fall Risk Score:        No data to display              Fall Interventions: bed in lowest position, wheels locked, call light in reach, side rail x1  Report given to: Margi RIVER

## 2024-07-23 NOTE — ED NOTES
ED to inpatient nurses report      Chief Complaint:  Chief Complaint   Patient presents with    Edema    Shortness of Breath     Present to ED from: home    MOA:     LOC: alert and orientated to name, place, date  Mobility: Requires assistance * 1  Oxygen Baseline: RA    Current needs required: RA     Code Status:   Prior    What abnormal results were found and what did you give/do to treat them? See results- [id number] bumex  Any procedures or intervention occur? See chart    Mental Status:  Level of Consciousness: Alert (0)    Psych Assessment:        Vitals:  Patient Vitals for the past 24 hrs:   BP Temp Pulse Resp SpO2   07/22/24 2307 (!) 154/89 -- 67 18 96 %   07/22/24 2224 (!) 158/96 -- 68 14 93 %   07/22/24 2108 (!) 184/88 -- 70 18 95 %   07/22/24 1951 (!) 184/64 98 °F (36.7 °C) -- -- --   07/22/24 1949 -- -- 72 18 95 %        LDAs:   Peripheral IV 07/18/24 Distal;Left;Anterior Cephalic (Active)       Ambulatory Status:  Presents to emergency department  because of falls (Syncope, seizure, or loss of consciousness): No, Age > 70: No, Altered Mental Status, Intoxication with alcohol or substance confusion (Disorientation, impaired judgment, poor safety awaremess, or inability to follow instructions): No, Impaired Mobility: Ambulates or transfers with assistive devices or assistance; Unable to ambulate or transer.: No, Nursing Judgement: Yes (fall last  night per pt due to fatigue)    Diagnosis:  DISPOSITION Decision To Admit 07/22/2024 11:06:14 PM   Final diagnoses:   Acute on chronic systolic CHF (congestive heart failure) (HCC)   Shortness of breath   Pitting edema        Consults:  None     Pain Score:       C-SSRS:   Risk of Suicide: No Risk    Sepsis Screening:       Melissa Fall Risk:  [place] 1 Fall Risk  Presents to emergency department  because of falls (Syncope, seizure, or loss of consciousness): No  Age > 70: No  Altered Mental Status, Intoxication with alcohol or substance confusion (Disorientation,

## 2024-07-23 NOTE — PALLIATIVE CARE
Initial Evaluation        Patient:   Garrett Duncan  YOB: 1965  Age:  59 y.o.  Room:  [facility]/019-  MRN:  [id number]   Acct: [id number]    Date of Admission:  7/22/2024  7:40 PM  Date of Service:  7/23/2024  Completed By:  Nallely Ingram RN        Reason for Palliative Care Evaluation:-   CHF, readmission     History    Alcohol abuse, anemia, CHF, CAD, CKD IV, DM, drug abuse, GERD, ICD, HTN, PAF s/p ablation, DDD, v-tach, smoker. Patient recently admitted 7/18-7/21 for anemia, CHF exacerbation. Patient had ortho surgery scheduled 7/16 but had to be cancelled. Pt to ED with complaints of leg swelling, states he has been taking his bumex. Also states he had a panic attack d/t feeling like he could not breathe and fell and hit his head.      Goals of Care Discussions and Plan         Family/Patient Discussion:- Patient resting in chair. Stated patient's name x1, patient opens eyes. This RN introduced self and role. Patient declines conversation at this time. He states, \"it's a bad time, people have been in and out of my room all day.\" This RN expresses understanding.     Plan/Follow-Up:-   Palliative care will follow up at a later date. Please contact if needs arise.       Code Status:Full Code No additional code details    ACP documents on file:  -Power of  for Healthcare  -Living Will    Healthcare Power of /Healthcare Surrogate Decision Makers:    Jesica Duncan (daughter/POA): [phone]    Lou Duncan (daughter/alternate): [phone]            Electronically signed by Nallely Ingram RN on 7/23/2024 at 2:32 PM           Palliative Care Office: [phone]

## 2024-07-23 NOTE — DISCHARGE INSTRUCTIONS
Recent INRs:      Recent Labs     07/27/24  0605   INR 2.19*      Warfarin Discharge Instructions:   Date Warfarin Dose   7/28/24 (tomorrow) 5 mg (1 tablet)   7/29/24 INR     Provider dosing warfarin: [facility]  Next INR date: 7/29/24    Follow up chf clinic 1-2 weeks  F/up dr rucker 4 weeks    You were admitted and treated for a heart failure exacerbation. This means your heart is not pumping blood to your body as well as it should, which can lead to a build of fluid in your lungs, legs, abdomen. This can also cause symptoms such as fatigue, swelling, weight change, cough, shortness of breath, inability to sleep when lying flat.     You have what is called systolic dysfunction or heart failure with reduced ejection fraction (HFrEF). This means the heart muscle is weak and can’t squeeze normally when the heart beats.     Ejection fraction (EF) is the percentage of blood in your heart that is pumped out with each heartbeat. Normal is 50-55%. Echocardiogram on showed that your EF is 25%. Approx 5L of fluid was removed with diuretics.  Today your weight is 276lbs.  It is important that you take your medications as prescribed. These medications have been shown to decrease morbidity and mortality (aka help you live longer) with heart failure and decrease symptoms).     Medications:  Metoprolol Beta-blocker - lower blood pressure and heart rate  Lisinopril ACE-inhibitor - improve blood flow, lower blood pressure   Bumex Water pill - prevent fluid build up     Limit your fluid and salt intake. Do not drink more than 2L daily (this includes ALL fluids). Do not exceed more than 2g of salt daily. Avoid fatty foods, fast foods. Avoid alcohol, stop smoking and lose weight if applicable.   Check your weight at the same time daily. If your weight increases by more than 3lb in a day or 5lb in a week, notify the heart failure clinic.  Notify your PCP or heart failure clinic for: weight change, swelling in your ankles, legs,  tight; persistent new cough, especially at night; you can no longer walk as far without being short of breath; you have to sit up to sleep; you feel lightheaded; persistent feeling of your heart racing.     Please take all of your hospital paperwork and medications to your follow up appointment. Follow up with your PCP within 1 week. Follow up with Cardiologist in 2-4 weeks.   Thank you for allowing us to participate in your care.     Electronically signed by Radha Villa PA-C on 7/27/2024 at 1:04 PM

## 2024-07-23 NOTE — PROGRESS NOTES
functional history, completion of standardized testing, formal and informal observation of tasks, assessment of data and development of plan of care and goals.  Treatment time included skilled education and facilitation of tasks to increase safety and independence with functional mobility for improved independence and quality of life.    Assessment:  Assessment: pt is MOD I with mobility and up ad mikal in room. pt declined any further PT needs.  Therapy Prognosis: Excellent    Requires PT Follow-Up: No  No Skilled PT: Independent with functional mobility     Discharge Recommendations:  Discharge Recommendations: Home independently    Patient Education:      .    Patient Education  Education Given To: Patient  Education Provided: Role of Therapy, Plan of Care  Education Method: Verbal  Barriers to Learning: None  Education Outcome: Verbalized understanding       Equipment Recommendations:  Equipment Needed: No    Plan:  General Plan:  (discharge PT)    Goals:  Patient Goals : feel better, get fluid off of legs  Short Term Goals  Time Frame for Short Term Goals: NA  Long Term Goals  Time Frame for Long Term Goals : NA    Following session, patient left in safe position with all fall risk precautions in place.

## 2024-07-23 NOTE — H&P
History & Physical  Internal Medicine Resident         Patient: Garrett Duncan 59 y.o. male      : 1965  Date of Admission: 2024  Date of Service: Pt seen/examined on 24 and Admitted to Inpatient with expected LOS greater than two midnights due to medical therapy.       ASSESSMENT AND PLAN    Acute exacerbation HFrEF 30% s/p AICD 10/2014: ICM NYHA class III   TTE 2024: Moderately reduced LV systolic function, EF 30 to 35%.  LA + LV mildly dilated.  Severe global hypokinesis present.  GDMT: Toprol XL + Farxiga  Diuretic: Bumex 2 mg daily.   proBNP ~74986, slighlty above previous of 28k on .   CXR: Cardiomegaly with increased pulmonary vascular congestion.   On physical exam +2 BL LE edema, diffuse crackles noted. Pt states he cannot breath at home, WEATHERS, PND, and feels like he is drowning. Symptoms did not improve with dose of Bumex this am at home, was not compliant with fluid restriction on last admission and was given oral Bumex during stay.    Was given IV bumex 2mg in ED, good UO, and significant improvement in symptoms.   - Will start on IV Bumex 2mg BID.  - Continue GDMT   - 2L /2g Na diet.   - Strict I/O, daily weights, Monitor electrolytes, Keep K>4, Mg>2.   - Limited ECHO ordered.     Fall  Pt states he was leaning on countertop due to WEATHERS, and closed his eyes when he started to struggle to breath he fell on ground due to anxiety attack, and hit his L occipital lobe. No LOC  CT H: No acute bleed noted. CT Cs: no acute Cervical spine fx identified.   - PT/OT ordered.     Type II MI 2/2 likely CHF exacebation:  On arrival Troponin x1: 147. Repeat pending. BL ~150-170. Pt denies CP, does have SOB that improved after diuretic in ED.   - Repeat troponin and EKG if CP occurs.     Subacute on Chronic normocytic anemia/ HARSHA:   Hgb of 6.3 on , was instructed to go to hematologist office from his planned surgery on , and was then sent to ED on .During admission he was given 2  10.0 standard drinks of alcohol     Types: 10 Shots of liquor per week     Comment: a pint or more of liquor a day pt states stopped drinking more than 1 year ago    Drug use: Not Currently     Comment: past john of cocaine    Sexual activity: Not Currently     Partners: Female     Social Determinants of Health     Financial Resource Strain: Low Risk  (7/11/2024)    Overall Financial Resource Strain (CARDIA)     Difficulty of Paying Living Expenses: Not hard at all   Food Insecurity: No Food Insecurity (7/23/2024)    Hunger Vital Sign     Worried About Running Out of Food in the Last Year: Never true     Ran Out of Food in the Last Year: Never true   Transportation Needs: No Transportation Needs (7/23/2024)    PRAPARE - Transportation     Lack of Transportation (Medical): No     Lack of Transportation (Non-Medical): No   Physical Activity: Inactive (7/11/2024)    Exercise Vital Sign     Days of Exercise per Week: 0 days     Minutes of Exercise per Session: 0 min   Stress: Stress Concern Present (7/11/2024)    [place] [employer] - Occupational Stress Questionnaire     Feeling of Stress : Rather much   Social Connections: Unknown (7/11/2024)    Social Connection and Isolation Panel [NHANES]     Frequency of Communication with Friends and Family: More than three times a week     Frequency of Social Gatherings with Friends and Family: More than three times a week     Active Member of Clubs or Organizations: No     Attends Club or Organization Meetings: Never     Marital Status:    Housing Stability: Low Risk  (7/23/2024)    Housing Stability Vital Sign     Unable to Pay for Housing in the Last Year: No     Number of Places Lived in the Last Year: 1     Unstable Housing in the Last Year: No        Code status: Full Code     Electronically signed by Gricelda Corral MD on 7/23/2024 at 5:29 AM.   Case was discussed with the Attending, Dr. Akers.

## 2024-07-23 NOTE — CARE COORDINATION
07/23/24 1300   Readmission Assessment   Number of Days since last admission? 1-7 days   Previous Disposition Home Alone   Who is being Interviewed Patient   What was the patient's/caregiver's perception as to why they think they needed to return back to the hospital? Other (Comment)  (increased swelling)   Did you visit your Primary Care Physician after you left the hospital, before you returned this time? No   Why weren't you able to visit your PCP? Other (Comment)  (was scheduled for 7/26)   Did you see a specialist, such as Cardiac, Pulmonary, Orthopedic Physician, etc. after you left the hospital? No   Who advised the patient to return to the hospital? Self-referral   Does the patient report anything that got in the way of taking their medications? No   In our efforts to provide the best possible care to you and others like you, can you think of anything that we could have done to help you after you left the hospital the first time, so that you might not have needed to return so soon? Other (Comment)  (no)

## 2024-07-23 NOTE — ED NOTES
Attempted to notify staff of patient transfer to IP bed 8A-19. 8A staff stated room was not clean. Also spoke to Bobbi in EVS who updated cleaning status. ED staff will monitor bed status and transport when available.

## 2024-07-24 ENCOUNTER — APPOINTMENT (OUTPATIENT)
Dept: GENERAL RADIOLOGY | Age: 59
End: 2024-07-24
Payer: COMMERCIAL

## 2024-07-24 LAB
ANION GAP SERPL CALC-SCNC: 9 MEQ/L (ref 8–16)
BUN SERPL-MCNC: 42 MG/DL (ref 7–22)
CALCIUM SERPL-MCNC: 9.2 MG/DL (ref 8.5–10.5)
CHLORIDE SERPL-SCNC: 105 MEQ/L (ref 98–111)
CO2 SERPL-SCNC: 25 MEQ/L (ref 23–33)
CREAT SERPL-MCNC: 3.5 MG/DL (ref 0.4–1.2)
DEPRECATED RDW RBC AUTO: 56.6 FL (ref 35–45)
ERYTHROCYTE [DISTWIDTH] IN BLOOD BY AUTOMATED COUNT: 19.8 % (ref 11.5–14.5)
GFR SERPL CREATININE-BSD FRML MDRD: 19 ML/MIN/1.73M2
GLUCOSE BLD STRIP.AUTO-MCNC: 160 MG/DL (ref 70–108)
GLUCOSE BLD STRIP.AUTO-MCNC: 193 MG/DL (ref 70–108)
GLUCOSE BLD STRIP.AUTO-MCNC: 225 MG/DL (ref 70–108)
GLUCOSE BLD STRIP.AUTO-MCNC: 227 MG/DL (ref 70–108)
GLUCOSE BLD STRIP.AUTO-MCNC: 238 MG/DL (ref 70–108)
GLUCOSE SERPL-MCNC: 130 MG/DL (ref 70–108)
HCT VFR BLD AUTO: 29.7 % (ref 42–52)
HGB BLD-MCNC: 8.4 GM/DL (ref 14–18)
INR PPP: 1.87 (ref 0.85–1.13)
MAGNESIUM SERPL-MCNC: 2.3 MG/DL (ref 1.6–2.4)
MCH RBC QN AUTO: 25.8 PG (ref 26–33)
MCHC RBC AUTO-ENTMCNC: 28.3 GM/DL (ref 32.2–35.5)
MCV RBC AUTO: 91.1 FL (ref 80–94)
PHOSPHATE SERPL-MCNC: 3.8 MG/DL (ref 2.4–4.7)
PLATELET # BLD AUTO: 138 THOU/MM3 (ref 130–400)
PMV BLD AUTO: 10.5 FL (ref 9.4–12.4)
POTASSIUM SERPL-SCNC: 4.5 MEQ/L (ref 3.5–5.2)
RBC # BLD AUTO: 3.26 MILL/MM3 (ref 4.7–6.1)
SODIUM SERPL-SCNC: 139 MEQ/L (ref 135–145)
WBC # BLD AUTO: 6.3 THOU/MM3 (ref 4.8–10.8)

## 2024-07-24 PROCEDURE — 6370000000 HC RX 637 (ALT 250 FOR IP)

## 2024-07-24 PROCEDURE — 1200000000 HC SEMI PRIVATE

## 2024-07-24 PROCEDURE — 82948 REAGENT STRIP/BLOOD GLUCOSE: CPT

## 2024-07-24 PROCEDURE — 6360000002 HC RX W HCPCS

## 2024-07-24 PROCEDURE — 6370000000 HC RX 637 (ALT 250 FOR IP): Performed by: PHARMACIST

## 2024-07-24 PROCEDURE — 36415 COLL VENOUS BLD VENIPUNCTURE: CPT

## 2024-07-24 PROCEDURE — 85027 COMPLETE CBC AUTOMATED: CPT

## 2024-07-24 PROCEDURE — 1200000003 HC TELEMETRY R&B

## 2024-07-24 PROCEDURE — 6360000002 HC RX W HCPCS: Performed by: INTERNAL MEDICINE

## 2024-07-24 PROCEDURE — 84100 ASSAY OF PHOSPHORUS: CPT

## 2024-07-24 PROCEDURE — 83735 ASSAY OF MAGNESIUM: CPT

## 2024-07-24 PROCEDURE — 2580000003 HC RX 258

## 2024-07-24 PROCEDURE — 6370000000 HC RX 637 (ALT 250 FOR IP): Performed by: INTERNAL MEDICINE

## 2024-07-24 PROCEDURE — 2580000003 HC RX 258: Performed by: INTERNAL MEDICINE

## 2024-07-24 PROCEDURE — 85610 PROTHROMBIN TIME: CPT

## 2024-07-24 PROCEDURE — 80048 BASIC METABOLIC PNL TOTAL CA: CPT

## 2024-07-24 PROCEDURE — 71045 X-RAY EXAM CHEST 1 VIEW: CPT

## 2024-07-24 RX ORDER — WARFARIN SODIUM 3 MG/1
6 TABLET ORAL ONCE
Status: COMPLETED | OUTPATIENT
Start: 2024-07-24 | End: 2024-07-24

## 2024-07-24 RX ORDER — LISINOPRIL 5 MG/1
5 TABLET ORAL DAILY
Status: DISCONTINUED | OUTPATIENT
Start: 2024-07-24 | End: 2024-07-27 | Stop reason: HOSPADM

## 2024-07-24 RX ADMIN — HYDROCODONE BITARTRATE AND ACETAMINOPHEN 2 TABLET: 5; 325 TABLET ORAL at 20:05

## 2024-07-24 RX ADMIN — BUMETANIDE 0.5 MG/HR: 0.25 INJECTION INTRAMUSCULAR; INTRAVENOUS at 13:04

## 2024-07-24 RX ADMIN — HYDRALAZINE HYDROCHLORIDE 100 MG: 50 TABLET ORAL at 12:56

## 2024-07-24 RX ADMIN — AMIODARONE HYDROCHLORIDE 200 MG: 200 TABLET ORAL at 08:25

## 2024-07-24 RX ADMIN — ATORVASTATIN CALCIUM 40 MG: 40 TABLET, FILM COATED ORAL at 22:11

## 2024-07-24 RX ADMIN — HYDRALAZINE HYDROCHLORIDE 100 MG: 50 TABLET ORAL at 08:25

## 2024-07-24 RX ADMIN — WARFARIN SODIUM 6 MG: 3 TABLET ORAL at 18:13

## 2024-07-24 RX ADMIN — METOPROLOL SUCCINATE 100 MG: 100 TABLET, EXTENDED RELEASE ORAL at 08:25

## 2024-07-24 RX ADMIN — INSULIN LISPRO 1 UNITS: 100 INJECTION, SOLUTION INTRAVENOUS; SUBCUTANEOUS at 12:56

## 2024-07-24 RX ADMIN — HYDROCODONE BITARTRATE AND ACETAMINOPHEN 2 TABLET: 5; 325 TABLET ORAL at 15:55

## 2024-07-24 RX ADMIN — ENOXAPARIN SODIUM 120 MG: 150 INJECTION SUBCUTANEOUS at 08:26

## 2024-07-24 RX ADMIN — LISINOPRIL 5 MG: 5 TABLET ORAL at 12:56

## 2024-07-24 RX ADMIN — HYDRALAZINE HYDROCHLORIDE 100 MG: 50 TABLET ORAL at 20:05

## 2024-07-24 RX ADMIN — SODIUM CHLORIDE, PRESERVATIVE FREE 10 ML: 5 INJECTION INTRAVENOUS at 08:26

## 2024-07-24 RX ADMIN — ISOSORBIDE MONONITRATE 120 MG: 60 TABLET, EXTENDED RELEASE ORAL at 08:25

## 2024-07-24 RX ADMIN — TICAGRELOR 90 MG: 90 TABLET ORAL at 22:11

## 2024-07-24 RX ADMIN — BUMETANIDE 2 MG: 0.25 INJECTION INTRAMUSCULAR; INTRAVENOUS at 08:27

## 2024-07-24 RX ADMIN — TICAGRELOR 90 MG: 90 TABLET ORAL at 08:25

## 2024-07-24 RX ADMIN — ALPRAZOLAM 0.5 MG: 0.5 TABLET ORAL at 20:05

## 2024-07-24 RX ADMIN — INSULIN GLARGINE 5 UNITS: 100 INJECTION, SOLUTION SUBCUTANEOUS at 22:11

## 2024-07-24 RX ADMIN — SODIUM CHLORIDE, PRESERVATIVE FREE 10 ML: 5 INJECTION INTRAVENOUS at 20:05

## 2024-07-24 RX ADMIN — HYDROCODONE BITARTRATE AND ACETAMINOPHEN 2 TABLET: 5; 325 TABLET ORAL at 08:30

## 2024-07-24 RX ADMIN — POLYETHYLENE GLYCOL 3350 17 G: 17 POWDER, FOR SOLUTION ORAL at 15:55

## 2024-07-24 ASSESSMENT — PAIN SCALES - GENERAL
PAINLEVEL_OUTOF10: 0
PAINLEVEL_OUTOF10: 8
PAINLEVEL_OUTOF10: 9
PAINLEVEL_OUTOF10: 8

## 2024-07-24 ASSESSMENT — PAIN DESCRIPTION - PAIN TYPE: TYPE: CHRONIC PAIN

## 2024-07-24 ASSESSMENT — PAIN DESCRIPTION - DESCRIPTORS: DESCRIPTORS: ACHING;SHARP;THROBBING

## 2024-07-24 ASSESSMENT — PAIN DESCRIPTION - LOCATION
LOCATION: BACK

## 2024-07-24 ASSESSMENT — PAIN DESCRIPTION - ONSET: ONSET: ON-GOING

## 2024-07-24 ASSESSMENT — PAIN DESCRIPTION - FREQUENCY: FREQUENCY: CONTINUOUS

## 2024-07-24 ASSESSMENT — PAIN DESCRIPTION - ORIENTATION: ORIENTATION: MID;LOWER

## 2024-07-24 NOTE — PROGRESS NOTES
Progress Note    Patient:  Garrett Duncan    Unit/Bed:8A-19/019-A  YOB: 1965  MRN: [id number]   Acct: [id number]   Admit date: 7/22/2024      Principal Problem:    HFrEF (heart failure with reduced ejection fraction) ([facility])  Active Problems:    Acute on chronic systolic CHF (congestive heart failure) ([facility])  Resolved Problems:    * No resolved hospital problems. *      Disposition continued inpatient care for diuresis secondary to acute systolic heart failure and pulmonary edema anticipated discharge in approximately 48 to 72 hours he will be discharged to home, he works as a  in a [employer] plant and has medical leave until September, please note patient recently discharged from hospital 7/21/2024    Assessment and Plan:  Acute systolic heart failure with underlying EF of approximately 30 to 35% most recent echo indicates 25% but review by cardiology confirms that is essentially unchanged from echocardiogram performed March 2024 he reports subjectively improved symptoms with controlled IV diuresis however I have noted less fluid retention in his lower extremities compared to 7/23/2024 but I would like to see resolution of lower extremity edema and the patient has noted some retained weight still and with his chronic kidney disease this has prompted Bumex drip initiated 7/24/2024 the order is in for approximately 24 hours serum creatinine to be assessed in a.m. to ensure it is not increasing.  Chronic kidney disease stage III-IV secondary to suspected underlying hypertensive nephrosclerosis serum creatinine currently stable approximate 3.5 mg/dL no nephrology consult this time since the serum potassium and serum bicarbonate are essentially normal and he has adequate urine output  8 beat run of V. tach 7/23/2024, has not had any further episodes overnight he had in 2014 and ablation for ventricular tachycardia  Elevated cardiac enzyme elevated troponin secondary to  appropriate to reduce radiation to a low as reasonably achievable.    CT HEAD WO CONTRAST    Result Date: 7/22/2024  Exam: CT Brain without contrast Comparison: 06/20/2024 Clinical history: Fall with HT Findings: Evaluation limited by motion No acute intracranial hemorrhage. Cerebral volume loss. No abnormal extra-axial fluid collections. No intracranial mass No midline shift. No skull fracture.     Impression: 1. No acute intracranial hemorrhage. 2. Cerebral volume loss. This document has been electronically signed by: Cate Martin MD on 07/22/2024 10:43 PM All CTs at this facility use dose modulation techniques and iterative reconstructions, and/or weight-based dosing when appropriate to reduce radiation to a low as reasonably achievable.    XR CHEST PORTABLE    Result Date: 7/22/2024  Exam: 1 View of the chest. Comparison: 07/18/2024 Findings: Prior median sternotomy. Left-sided pacemaker Cardiac silhouette is enlarged. Prominence to the central pulmonary vasculature suggesting volume overload. No pleural fluid collection. Perihilar opacities may represent pulmonary edema     Impression: 1. Enlarged cardiac silhouette with prominence to the central pulmonary vasculature and bilateral perihilar opacities suggesting volume overload/pulmonary edema This document has been electronically signed by: Cate Martin MD on 07/22/2024 09:12 PM    XR CHEST PORTABLE    Result Date: 7/18/2024  PROCEDURE: XR CHEST PORTABLE CLINICAL INFORMATION: shortness of breath. COMPARISON: Chest x-ray dated 6/13/2024.. TECHNIQUE: AP upright view of the chest. FINDINGS: There is cardiomegaly in this patient status post pacemaker and previous sternotomy. .The mediastinum is not widened.  There are no pulmonary infiltrates or effusions. The pulmonary vascularity is increased No suspicious osseous lesions are present.     No interval change since previous study dated 6/13/2024.. **This report has been created using voice recognition

## 2024-07-24 NOTE — CONSULTS
[facility]'s  Consult    Patient's Name/Date of Birth: Garrett Duncan / 1965 (59 y.o.)    Date: July 24, 2024     Referring Provider: Dangelo Akers MD    CHIEF COMPLAINT: sob worse and leg edema    Reason of consultation- chf exacen and elevated troponin and cmp      HPI: This is a pleasant 59 y.o. male with PMHx of HTN, HLD, chronic back pain, DM2, CAD s/p CABG x3 3/14, PCI-LAD (10/2020), CKD stage IV, pA.Fib on Coumadin, hx. of V-tac s/p ablation 2014, chronic anemia, CHF s/p ACID who presented with lower extremity edema, fall, and SOB.  Pt had spine fusion on 7/16but found to have low HbH of 6.6. Patient was admitted for low hemoglobin and given 2 units of PRBC as well as iron.  During his stay patient noted increasing lower extremity edema and after discharge patient edema got worse even after self increasing his diuretics. ED VS: /88, heart rate 70. Trops 165. Pro-BNP 10115.  EKG shows sinus rhythm with PAC, septal infarct (consistent with 05/20/24). CXR showed cardiomegaly with increased vascular congestion.  CT head and Spine showed no acute changes. Pt is started was given IM bumex with very little improvement and started on IV bumex at 0.5mg/hr.     Today, pt seen at bedside. Pt report improvement in SOB and LE swelling. Pt denies chest pain, chest palpitation, dizziness, headaches, abdominal pain.     Echo: (7/23/24) EF of 25%. LV moderately dilated. Global hypokinesis. Echo (3/08/24): EF 30-35%. Left ventricle is mildly dilated. Normal wall thickness. Severe global hypokinesis present.        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    CAD (coronary artery disease) 03/25/2014    s/p CABG in may 2014     no rales, wheezes, or rhonchi  Abdomen: positive bowel sounds, soft, non-tender, non-distended, no bruits, no masses  Extremities:no clubbing, cyanosis or edema  Neurologic: alert and oriented x 3, cranial nerves 2-12 grossly intact, motor and sensory intact, moving all extremities  Skin: No rashes  Psych: AO x 3, no depression/sindy, no pressured speech, normal affect  Lymph: No obvious LAD      Med, labs, EKG , echo and imaging studies reviewed      Assessment:  Acute exacerbation of HFrEF  - Heart Score: 7  - Chronic trops elevated.  Chronic normocytic anemia   pAfib  - CHADVASC score: 5  IDDM2  HTN  HLD      Plan:  Continue IV Bumex. Monitor I/O. EF not significantly reduced for any cardiac intervention. Medication optimization as outpatient with cardiology follow up.   Recommend nephrology consultation.  Manage other problems as per primary team.     Thank you for allowing us to participate in the care of this patient.  Please do not hesitate to call us with questions.     Electronically signed by Tomas Jiménez DO on 7/24/2024 at 2:03 PM      I have seen and examined the patient independently. . Face to face evaluation and examination performed.   The above evaluation and note has been reviewed and Changes made as necessary. Labs, EKG, echo, and radiographs reviewed.   I Have discussed with Tomas Jiménez DO about this patient in detail   D/w Patient's R.N. and specific instructions given and cardiovascular disease issues addressed.   I above assessment and plan has been reviewed and modified per my assessment as necessary and I agree with it.    Acute on chronic HFrEF  CMP ischemic  CAD s/p CABG  Elevated troponin - chronic and demand related  PAF  CKD IV  Cardiorenal syndrome       Plan:   Continue with gentle diuresis  EF  is low  and there is NO significant drop  to require further cardiac intervention or work up  No need for cath now  Cont GDMT for CHF and CMP and CAD    Recommend nephrology

## 2024-07-24 NOTE — CARE COORDINATION
7/24/24, 2:14 PM EDT    DISCHARGE ON GOING EVALUATION    [facility] day: 1  Location: 8A-19/019-A Reason for admit: Shortness of breath [R06.02]  Pitting edema [R60.9]  Acute on chronic systolic CHF (congestive heart failure) ([facility]) [I50.23]  HFrEF (heart failure with reduced ejection fraction) ([facility]) [I50.20]  Congestive heart failure, unspecified HF chronicity, unspecified heart failure type ([facility]) [I50.9]     Procedures:   7/23 Echo: ef 25%    Imaging since last note:   7/24 CXR:   1. Moderate cardiomegaly. Poor inflation of the lungs. Kyphotic positioning of  the patient. Permanent pacemaker/defibrillator. Metallic sternotomy sutures.  2. Mild residual pneumonia/edema involving both lungs relatively diffusely,  improved from prior.  3. Questionable mild atelectasis/pneumonia left parahilar region.     Barriers to Discharge: Requiring bumex gtt. Metoprolol. Amio. Lisinopril. Imdur. Pain control. New cardio consult. PT/OT.     PCP: Leonel Adorno, APRN - CNP  Readmission Risk Score: 40.1    Patient Goals/Plan/Treatment Preferences: Return home alone, denied needs. Follow for possible LV.

## 2024-07-24 NOTE — PROGRESS NOTES
Warfarin Pharmacy Consult Note    Warfarin Indication: Atrial fibrillation or Atrial flutter  Target INR: 2.0-3.0  Dose prior to admission: [facility] coumadin clinic    Recent Labs     07/22/24  2110 07/23/24  0621 07/24/24  0841   HGB 8.0* 8.1* 8.4*    141 138     Recent Labs     07/22/24 2226 07/23/24  0621 07/24/24  0841   INR 1.77* 1.82* 1.87*     Concurrent anticoagulants/antiplatelets: Lovenox, Brilinta  Significant warfarin drug-drug interactions: Amiodarone     Date INR Warfarin Dose   7/23/24 1.82 5 mg    7/24/24 1.87  6 mg                                                Monitoring:                   INR will be monitored daily until therapeutic INR is achieved.    **Please contact pharmacy for discharge instructions when indicated**    Stephenie ROSAS.Ph., BCPS., 7/24/2024,11:08 AM

## 2024-07-25 LAB
ANION GAP SERPL CALC-SCNC: 10 MEQ/L (ref 8–16)
ANION GAP SERPL CALC-SCNC: 12 MEQ/L (ref 8–16)
BUN SERPL-MCNC: 45 MG/DL (ref 7–22)
BUN SERPL-MCNC: 46 MG/DL (ref 7–22)
CALCIUM SERPL-MCNC: 8.2 MG/DL (ref 8.5–10.5)
CALCIUM SERPL-MCNC: 8.5 MG/DL (ref 8.5–10.5)
CHLORIDE SERPL-SCNC: 100 MEQ/L (ref 98–111)
CHLORIDE SERPL-SCNC: 102 MEQ/L (ref 98–111)
CO2 SERPL-SCNC: 24 MEQ/L (ref 23–33)
CO2 SERPL-SCNC: 25 MEQ/L (ref 23–33)
CREAT SERPL-MCNC: 3.4 MG/DL (ref 0.4–1.2)
CREAT SERPL-MCNC: 3.4 MG/DL (ref 0.4–1.2)
DEPRECATED RDW RBC AUTO: 60.1 FL (ref 35–45)
ERYTHROCYTE [DISTWIDTH] IN BLOOD BY AUTOMATED COUNT: 20.2 % (ref 11.5–14.5)
GFR SERPL CREATININE-BSD FRML MDRD: 20 ML/MIN/1.73M2
GFR SERPL CREATININE-BSD FRML MDRD: 20 ML/MIN/1.73M2
GLUCOSE BLD STRIP.AUTO-MCNC: 159 MG/DL (ref 70–108)
GLUCOSE BLD STRIP.AUTO-MCNC: 247 MG/DL (ref 70–108)
GLUCOSE BLD STRIP.AUTO-MCNC: 326 MG/DL (ref 70–108)
GLUCOSE BLD STRIP.AUTO-MCNC: 371 MG/DL (ref 70–108)
GLUCOSE SERPL-MCNC: 160 MG/DL (ref 70–108)
GLUCOSE SERPL-MCNC: 347 MG/DL (ref 70–108)
HCT VFR BLD AUTO: 28.1 % (ref 42–52)
HGB BLD-MCNC: 8.1 GM/DL (ref 14–18)
INR PPP: 1.74 (ref 0.85–1.13)
MAGNESIUM SERPL-MCNC: 2.1 MG/DL (ref 1.6–2.4)
MCH RBC QN AUTO: 26.2 PG (ref 26–33)
MCHC RBC AUTO-ENTMCNC: 28.8 GM/DL (ref 32.2–35.5)
MCV RBC AUTO: 90.9 FL (ref 80–94)
PHOSPHATE SERPL-MCNC: 3.6 MG/DL (ref 2.4–4.7)
PLATELET # BLD AUTO: 135 THOU/MM3 (ref 130–400)
PMV BLD AUTO: 10 FL (ref 9.4–12.4)
POTASSIUM SERPL-SCNC: 4.4 MEQ/L (ref 3.5–5.2)
POTASSIUM SERPL-SCNC: 4.5 MEQ/L (ref 3.5–5.2)
RBC # BLD AUTO: 3.09 MILL/MM3 (ref 4.7–6.1)
SODIUM SERPL-SCNC: 136 MEQ/L (ref 135–145)
SODIUM SERPL-SCNC: 137 MEQ/L (ref 135–145)
WBC # BLD AUTO: 6.1 THOU/MM3 (ref 4.8–10.8)

## 2024-07-25 PROCEDURE — 6370000000 HC RX 637 (ALT 250 FOR IP)

## 2024-07-25 PROCEDURE — 80048 BASIC METABOLIC PNL TOTAL CA: CPT

## 2024-07-25 PROCEDURE — 36415 COLL VENOUS BLD VENIPUNCTURE: CPT

## 2024-07-25 PROCEDURE — 6370000000 HC RX 637 (ALT 250 FOR IP): Performed by: INTERNAL MEDICINE

## 2024-07-25 PROCEDURE — 84100 ASSAY OF PHOSPHORUS: CPT

## 2024-07-25 PROCEDURE — 1200000003 HC TELEMETRY R&B

## 2024-07-25 PROCEDURE — 1200000000 HC SEMI PRIVATE

## 2024-07-25 PROCEDURE — 83735 ASSAY OF MAGNESIUM: CPT

## 2024-07-25 PROCEDURE — 2580000003 HC RX 258: Performed by: PHYSICIAN ASSISTANT

## 2024-07-25 PROCEDURE — 82948 REAGENT STRIP/BLOOD GLUCOSE: CPT

## 2024-07-25 PROCEDURE — 85610 PROTHROMBIN TIME: CPT

## 2024-07-25 PROCEDURE — 6360000002 HC RX W HCPCS

## 2024-07-25 PROCEDURE — 6370000000 HC RX 637 (ALT 250 FOR IP): Performed by: PHARMACIST

## 2024-07-25 PROCEDURE — 85027 COMPLETE CBC AUTOMATED: CPT

## 2024-07-25 PROCEDURE — 6360000002 HC RX W HCPCS: Performed by: PHYSICIAN ASSISTANT

## 2024-07-25 PROCEDURE — 2580000003 HC RX 258

## 2024-07-25 RX ORDER — WARFARIN SODIUM 7.5 MG/1
7.5 TABLET ORAL ONCE
Status: COMPLETED | OUTPATIENT
Start: 2024-07-25 | End: 2024-07-25

## 2024-07-25 RX ADMIN — HYDROCODONE BITARTRATE AND ACETAMINOPHEN 2 TABLET: 5; 325 TABLET ORAL at 21:51

## 2024-07-25 RX ADMIN — SODIUM CHLORIDE, PRESERVATIVE FREE 10 ML: 5 INJECTION INTRAVENOUS at 08:16

## 2024-07-25 RX ADMIN — HYDROCODONE BITARTRATE AND ACETAMINOPHEN 2 TABLET: 5; 325 TABLET ORAL at 16:57

## 2024-07-25 RX ADMIN — LISINOPRIL 5 MG: 5 TABLET ORAL at 08:45

## 2024-07-25 RX ADMIN — WARFARIN SODIUM 7.5 MG: 7.5 TABLET ORAL at 16:57

## 2024-07-25 RX ADMIN — HYDRALAZINE HYDROCHLORIDE 100 MG: 50 TABLET ORAL at 08:45

## 2024-07-25 RX ADMIN — BUMETANIDE 1 MG/HR: 0.25 INJECTION INTRAMUSCULAR; INTRAVENOUS at 11:55

## 2024-07-25 RX ADMIN — HYDRALAZINE HYDROCHLORIDE 100 MG: 50 TABLET ORAL at 12:50

## 2024-07-25 RX ADMIN — ALPRAZOLAM 0.5 MG: 0.5 TABLET ORAL at 21:52

## 2024-07-25 RX ADMIN — INSULIN LISPRO 1 UNITS: 100 INJECTION, SOLUTION INTRAVENOUS; SUBCUTANEOUS at 12:51

## 2024-07-25 RX ADMIN — INSULIN LISPRO 4 UNITS: 100 INJECTION, SOLUTION INTRAVENOUS; SUBCUTANEOUS at 21:53

## 2024-07-25 RX ADMIN — TICAGRELOR 90 MG: 90 TABLET ORAL at 08:45

## 2024-07-25 RX ADMIN — HYDROCODONE BITARTRATE AND ACETAMINOPHEN 2 TABLET: 5; 325 TABLET ORAL at 08:46

## 2024-07-25 RX ADMIN — TICAGRELOR 90 MG: 90 TABLET ORAL at 21:52

## 2024-07-25 RX ADMIN — ATORVASTATIN CALCIUM 40 MG: 40 TABLET, FILM COATED ORAL at 21:52

## 2024-07-25 RX ADMIN — INSULIN LISPRO 4 UNITS: 100 INJECTION, SOLUTION INTRAVENOUS; SUBCUTANEOUS at 17:03

## 2024-07-25 RX ADMIN — AMIODARONE HYDROCHLORIDE 200 MG: 200 TABLET ORAL at 08:45

## 2024-07-25 RX ADMIN — ISOSORBIDE MONONITRATE 120 MG: 60 TABLET, EXTENDED RELEASE ORAL at 08:45

## 2024-07-25 RX ADMIN — METOPROLOL SUCCINATE 100 MG: 100 TABLET, EXTENDED RELEASE ORAL at 08:45

## 2024-07-25 RX ADMIN — HYDRALAZINE HYDROCHLORIDE 100 MG: 50 TABLET ORAL at 21:52

## 2024-07-25 RX ADMIN — INSULIN GLARGINE 5 UNITS: 100 INJECTION, SOLUTION SUBCUTANEOUS at 21:52

## 2024-07-25 RX ADMIN — ENOXAPARIN SODIUM 120 MG: 150 INJECTION SUBCUTANEOUS at 08:45

## 2024-07-25 ASSESSMENT — PAIN SCALES - GENERAL
PAINLEVEL_OUTOF10: 4
PAINLEVEL_OUTOF10: 4
PAINLEVEL_OUTOF10: 7
PAINLEVEL_OUTOF10: 4
PAINLEVEL_OUTOF10: 7
PAINLEVEL_OUTOF10: 7
PAINLEVEL_OUTOF10: 8

## 2024-07-25 ASSESSMENT — PAIN DESCRIPTION - LOCATION
LOCATION: BACK
LOCATION: BACK

## 2024-07-25 ASSESSMENT — PAIN DESCRIPTION - PAIN TYPE: TYPE: CHRONIC PAIN

## 2024-07-25 ASSESSMENT — PAIN DESCRIPTION - ORIENTATION
ORIENTATION: LOWER;MID
ORIENTATION: LOWER

## 2024-07-25 ASSESSMENT — PAIN DESCRIPTION - FREQUENCY: FREQUENCY: CONTINUOUS

## 2024-07-25 ASSESSMENT — PAIN DESCRIPTION - DESCRIPTORS
DESCRIPTORS: ACHING;SHARP
DESCRIPTORS: ACHING;SHARP

## 2024-07-25 ASSESSMENT — PAIN DESCRIPTION - DIRECTION: RADIATING_TOWARDS: DOWN BOTH LEGS

## 2024-07-25 ASSESSMENT — PAIN - FUNCTIONAL ASSESSMENT: PAIN_FUNCTIONAL_ASSESSMENT: ACTIVITIES ARE NOT PREVENTED

## 2024-07-25 ASSESSMENT — PAIN DESCRIPTION - ONSET: ONSET: ON-GOING

## 2024-07-25 NOTE — PROGRESS NOTES
ASSESSMENT  Garrett is a 59-yr-old patient who has heart failure and other health issues. He does not have a good support system due to his broken relationship with his family, he said. His children understands his problems. Garrett wants to change the situations in his life. He believes in [place]. He said he prays several times. He confided in me.    INTERVENTION  I listened to him closely and proposed a new beginning with God. I mentioned a few steps in the process of restoration and normalization with his children. But he kept saying that he messed up things. I led him in a prayer, as he wished.     OUTCOME  Before I left, I asked him to call us for any extra help in any area of his life.   He joined me in prayer and fully agreeing with the supplications made.     CARE PLAN    A followup visit with Garrett is in order.

## 2024-07-25 NOTE — PROGRESS NOTES
been admitted for acute systolic heart failure and subtherapeutic INR presenting with dyspnea, he has received Bumex IV twice daily on day of admission but has been transitioned to Bumex drip as of 7/24/2024 since he has underlying chronic kidney disease stage approximately 4, and to facilitate without further renal strain and net negative fluid balance.  Patient states he feels subjectively improved compared to 7/23/2024 but still has mild bibasilar crackles repeat chest x-ray performed 7/24/2024 shows modest improvement in pulmonary edema     Cardiology consult obtained 7/24/2024 secondary to noted mild decrease in documented ejection fraction from echocardiogram performed 7/23/2024 however I appreciate cardiology consult since it does not appear to be clinically significant.     I will defer nephrology consulted for now as recommended by cardiology since he does have chronic kidney disease I have noted his creatinine appears to be essentially at baseline since 2023 his creatinine is over 3.4 mg/dL, this is also reinforced to my decision to start a Lasix drip to minimize renal strain.     Patient is tolerating p.o. intake he is aware of plan of care I have discussed Bumex drip with him and cardiology consult with him he is in agreement\"    Medications:    Infusion Medications    bumetanide (BUMEX) 12.5 mg in sodium chloride 0.9 % 125 mL infusion 1 mg/hr (07/25/24 1155)    sodium chloride      dextrose      Scheduled Medications    warfarin  7.5 mg Oral Once    lisinopril  5 mg Oral Daily    ALPRAZolam  0.5 mg Oral Nightly    amiodarone  200 mg Oral Daily    atorvastatin  40 mg Oral Nightly    ticagrelor  90 mg Oral BID    enoxaparin  1 mg/kg SubCUTAneous Daily    hydrALAZINE  100 mg Oral TID    insulin glargine  5 Units SubCUTAneous Nightly    isosorbide mononitrate  120 mg Oral Daily    metoprolol succinate  100 mg Oral Daily    sodium chloride flush  5-40 mL IntraVENous 2 times per day    insulin lispro  0-4  Units SubCUTAneous TID     insulin lispro  0-4 Units SubCUTAneous Nightly    warfarin placeholder: dosing by pharmacy   Other RX Placeholder    PRN Meds: sodium chloride flush, sodium chloride, potassium chloride **OR** potassium alternative oral replacement **OR** potassium chloride, magnesium sulfate, ondansetron **OR** ondansetron, polyethylene glycol, acetaminophen **OR** acetaminophen, melatonin, glucose, dextrose bolus **OR** dextrose bolus, glucagon (rDNA), dextrose, HYDROcodone 5 mg - acetaminophen **OR** HYDROcodone 5 mg - acetaminophen    Exam:  /87   Pulse 63   Temp 98.6 °F (37 °C) (Oral)   Resp 18   Ht 1.854 m (6' 1\")   Wt 125.2 kg (276 lb 0.3 oz)   SpO2 97%   BMI 36.42 kg/m²   General:  No distress, appears stated age.   Eyes:  PERRL. Conjunctivae/corneas clear.  HENT: Head normal appearing. Nares normal. Oral mucosa moist.  Hearing intact.   Neck: Supple, with full range of motion. Trachea midline.  No gross JVD appreciated.  Respiratory:  Normal effort. Clear to auscultation, without rales or wheezes or rhonchi.  Cardiovascular: Normal rate, regular rhythm with normal S1/S2 without murmurs.    3-4+ b/l lower extremity edema.   Abdomen: Soft, non-tender, non-distended with normal bowel sounds.  Musculoskeletal: No joint swelling or tenderness. Normal tone. No abnormal movements.   Skin: Warm and dry. No rashes or lesions.  Neurologic:  No focal sensory/motor deficits in the upper or lower extremities. Cranial nerves:  grossly non-focal 2-12.     Psychiatric: Alert and oriented, normal insight and thought content.   Capillary Refill: Brisk,< 3 seconds.      Labs/Radiology: See chart or assessment above.     Electronically signed by Radha Villa PA-C on 7/25/2024 at 4:12 PM

## 2024-07-25 NOTE — PROGRESS NOTES
Warfarin Pharmacy Consult Note    Warfarin Indication: Atrial fibrillation or Atrial flutter  Target INR: 2.0-3.0  Dose prior to admission: 2.5mg MF, 5mg all other days    Recent Labs     07/23/24  0621 07/24/24  0841 07/25/24  0550   HGB 8.1* 8.4* 8.1*    138 135     Recent Labs     07/23/24  0621 07/24/24  0841 07/25/24  0550   INR 1.82* 1.87* 1.74*     Concurrent anticoagulants/antiplatelets: Lovenox, Brilinta  Significant warfarin drug-drug interactions: Amiodarone     Date INR Warfarin Dose   7/23/24 1.82 5 mg    7/24/24 1.87  6 mg    7/25/24   1.74 7.5 mg                                        Monitoring:                   INR will be monitored daily until therapeutic INR is achieved.    **Please contact pharmacy for discharge instructions when indicated**    Stephenie Saxena R.Ph., BCPS., 7/25/2024,8:02 AM

## 2024-07-25 NOTE — PROGRESS NOTES
Cardiology Progress Note      Patient:  Garrett Duncan  YOB: 1965  MRN: [id number]   Acct: [id number]  Admit Date:  7/22/2024  Primary Cardiologist: Fany Guzmán MD    Note per dr wyatt \"CHIEF COMPLAINT: sob worse and leg edema     Reason of consultation- chf exacen and elevated troponin and cmp        HPI: This is a pleasant 59 y.o. male with PMHx of HTN, HLD, chronic back pain, DM2, CAD s/p CABG x3 3/14, PCI-LAD (10/2020), CKD stage IV, pA.Fib on Coumadin, hx. of V-tac s/p ablation 2014, chronic anemia, CHF s/p ACID who presented with lower extremity edema, fall, and SOB.  Pt had spine fusion on 7/16but found to have low HbH of 6.6. Patient was admitted for low hemoglobin and given 2 units of PRBC as well as iron.  During his stay patient noted increasing lower extremity edema and after discharge patient edema got worse even after self increasing his diuretics. ED VS: /88, heart rate 70. Trops 165. Pro-BNP 49470.  EKG shows sinus rhythm with PAC, septal infarct (consistent with 05/20/24). CXR showed cardiomegaly with increased vascular congestion.  CT head and Spine showed no acute changes. Pt is started was given IM bumex with very little improvement and started on IV bumex at 0.5mg/hr.     Today, pt seen at bedside. Pt report improvement in SOB and LE swelling. Pt denies chest pain, chest palpitation, dizziness, headaches, abdominal pain. \"    Subjective (Events in last 24 hours): pt awake and alert.  NAD.  No cp.  Sob improved since admission.  Still with leg edema  Bumex gtt stopped this am by attending    On RA  Net I/o  - not done    Objective:   /65   Pulse 65   Temp 98.6 °F (37 °C) (Oral)   Resp 18   Ht 1.854 m (6' 1\")   Wt 125.2 kg (276 lb 0.3 oz)   SpO2 100%   BMI 36.42 kg/m²        TELEMETRY: nsr with pvcs    Physical Exam:  General Appearance: alert and oriented to person, place and time, in no acute distress  Cardiovascular: normal rate, regular rhythm, normal S1

## 2024-07-26 PROBLEM — R60.9 PITTING EDEMA: Status: ACTIVE | Noted: 2024-07-26

## 2024-07-26 LAB
ANION GAP SERPL CALC-SCNC: 13 MEQ/L (ref 8–16)
ANION GAP SERPL CALC-SCNC: 15 MEQ/L (ref 8–16)
BUN SERPL-MCNC: 48 MG/DL (ref 7–22)
BUN SERPL-MCNC: 49 MG/DL (ref 7–22)
CALCIUM SERPL-MCNC: 8 MG/DL (ref 8.5–10.5)
CALCIUM SERPL-MCNC: 8.2 MG/DL (ref 8.5–10.5)
CHLORIDE SERPL-SCNC: 103 MEQ/L (ref 98–111)
CHLORIDE SERPL-SCNC: 97 MEQ/L (ref 98–111)
CO2 SERPL-SCNC: 23 MEQ/L (ref 23–33)
CO2 SERPL-SCNC: 23 MEQ/L (ref 23–33)
CREAT SERPL-MCNC: 3.4 MG/DL (ref 0.4–1.2)
CREAT SERPL-MCNC: 3.5 MG/DL (ref 0.4–1.2)
DEPRECATED RDW RBC AUTO: 61.7 FL (ref 35–45)
ERYTHROCYTE [DISTWIDTH] IN BLOOD BY AUTOMATED COUNT: 20.3 % (ref 11.5–14.5)
GFR SERPL CREATININE-BSD FRML MDRD: 19 ML/MIN/1.73M2
GFR SERPL CREATININE-BSD FRML MDRD: 20 ML/MIN/1.73M2
GLUCOSE BLD STRIP.AUTO-MCNC: 166 MG/DL (ref 70–108)
GLUCOSE BLD STRIP.AUTO-MCNC: 186 MG/DL (ref 70–108)
GLUCOSE BLD STRIP.AUTO-MCNC: 205 MG/DL (ref 70–108)
GLUCOSE BLD STRIP.AUTO-MCNC: 268 MG/DL (ref 70–108)
GLUCOSE SERPL-MCNC: 161 MG/DL (ref 70–108)
GLUCOSE SERPL-MCNC: 196 MG/DL (ref 70–108)
HCT VFR BLD AUTO: 30.5 % (ref 42–52)
HGB BLD-MCNC: 8.8 GM/DL (ref 14–18)
INR PPP: 1.8 (ref 0.85–1.13)
MAGNESIUM SERPL-MCNC: 2.1 MG/DL (ref 1.6–2.4)
MCH RBC QN AUTO: 26.5 PG (ref 26–33)
MCHC RBC AUTO-ENTMCNC: 28.9 GM/DL (ref 32.2–35.5)
MCV RBC AUTO: 91.9 FL (ref 80–94)
PHOSPHATE SERPL-MCNC: 3.6 MG/DL (ref 2.4–4.7)
PLATELET # BLD AUTO: 144 THOU/MM3 (ref 130–400)
PMV BLD AUTO: 10.1 FL (ref 9.4–12.4)
POTASSIUM SERPL-SCNC: 4.3 MEQ/L (ref 3.5–5.2)
POTASSIUM SERPL-SCNC: 4.4 MEQ/L (ref 3.5–5.2)
RBC # BLD AUTO: 3.32 MILL/MM3 (ref 4.7–6.1)
SCAN OF BLOOD SMEAR: NORMAL
SODIUM SERPL-SCNC: 133 MEQ/L (ref 135–145)
SODIUM SERPL-SCNC: 141 MEQ/L (ref 135–145)
WBC # BLD AUTO: 5.5 THOU/MM3 (ref 4.8–10.8)

## 2024-07-26 PROCEDURE — 6370000000 HC RX 637 (ALT 250 FOR IP): Performed by: PHARMACIST

## 2024-07-26 PROCEDURE — 6370000000 HC RX 637 (ALT 250 FOR IP): Performed by: PHYSICIAN ASSISTANT

## 2024-07-26 PROCEDURE — 82948 REAGENT STRIP/BLOOD GLUCOSE: CPT

## 2024-07-26 PROCEDURE — 36415 COLL VENOUS BLD VENIPUNCTURE: CPT

## 2024-07-26 PROCEDURE — 6370000000 HC RX 637 (ALT 250 FOR IP)

## 2024-07-26 PROCEDURE — 2580000003 HC RX 258: Performed by: PHYSICIAN ASSISTANT

## 2024-07-26 PROCEDURE — 6370000000 HC RX 637 (ALT 250 FOR IP): Performed by: INTERNAL MEDICINE

## 2024-07-26 PROCEDURE — 83735 ASSAY OF MAGNESIUM: CPT

## 2024-07-26 PROCEDURE — 85610 PROTHROMBIN TIME: CPT

## 2024-07-26 PROCEDURE — 85027 COMPLETE CBC AUTOMATED: CPT

## 2024-07-26 PROCEDURE — 80048 BASIC METABOLIC PNL TOTAL CA: CPT

## 2024-07-26 PROCEDURE — 2580000003 HC RX 258

## 2024-07-26 PROCEDURE — 6360000002 HC RX W HCPCS: Performed by: PHYSICIAN ASSISTANT

## 2024-07-26 PROCEDURE — 84100 ASSAY OF PHOSPHORUS: CPT

## 2024-07-26 PROCEDURE — 1200000003 HC TELEMETRY R&B

## 2024-07-26 PROCEDURE — 6360000002 HC RX W HCPCS

## 2024-07-26 RX ORDER — WARFARIN SODIUM 3 MG/1
6 TABLET ORAL ONCE
Status: COMPLETED | OUTPATIENT
Start: 2024-07-26 | End: 2024-07-26

## 2024-07-26 RX ORDER — METOLAZONE 5 MG/1
5 TABLET ORAL ONCE
Status: COMPLETED | OUTPATIENT
Start: 2024-07-26 | End: 2024-07-26

## 2024-07-26 RX ADMIN — HYDRALAZINE HYDROCHLORIDE 100 MG: 50 TABLET ORAL at 20:04

## 2024-07-26 RX ADMIN — INSULIN GLARGINE 5 UNITS: 100 INJECTION, SOLUTION SUBCUTANEOUS at 20:05

## 2024-07-26 RX ADMIN — TICAGRELOR 90 MG: 90 TABLET ORAL at 20:04

## 2024-07-26 RX ADMIN — HYDRALAZINE HYDROCHLORIDE 100 MG: 50 TABLET ORAL at 07:59

## 2024-07-26 RX ADMIN — INSULIN LISPRO 1 UNITS: 100 INJECTION, SOLUTION INTRAVENOUS; SUBCUTANEOUS at 17:33

## 2024-07-26 RX ADMIN — ENOXAPARIN SODIUM 120 MG: 150 INJECTION SUBCUTANEOUS at 08:00

## 2024-07-26 RX ADMIN — HYDROCODONE BITARTRATE AND ACETAMINOPHEN 2 TABLET: 5; 325 TABLET ORAL at 04:43

## 2024-07-26 RX ADMIN — LISINOPRIL 5 MG: 5 TABLET ORAL at 08:00

## 2024-07-26 RX ADMIN — TICAGRELOR 90 MG: 90 TABLET ORAL at 07:59

## 2024-07-26 RX ADMIN — METOPROLOL SUCCINATE 100 MG: 100 TABLET, EXTENDED RELEASE ORAL at 07:59

## 2024-07-26 RX ADMIN — ISOSORBIDE MONONITRATE 120 MG: 60 TABLET, EXTENDED RELEASE ORAL at 07:59

## 2024-07-26 RX ADMIN — SODIUM CHLORIDE, PRESERVATIVE FREE 10 ML: 5 INJECTION INTRAVENOUS at 20:05

## 2024-07-26 RX ADMIN — HYDROCODONE BITARTRATE AND ACETAMINOPHEN 2 TABLET: 5; 325 TABLET ORAL at 20:04

## 2024-07-26 RX ADMIN — AMIODARONE HYDROCHLORIDE 200 MG: 200 TABLET ORAL at 08:00

## 2024-07-26 RX ADMIN — BUMETANIDE 0.5 MG/HR: 0.25 INJECTION INTRAMUSCULAR; INTRAVENOUS at 04:44

## 2024-07-26 RX ADMIN — BUMETANIDE 1 MG/HR: 0.25 INJECTION INTRAMUSCULAR; INTRAVENOUS at 13:05

## 2024-07-26 RX ADMIN — INSULIN LISPRO 2 UNITS: 100 INJECTION, SOLUTION INTRAVENOUS; SUBCUTANEOUS at 13:06

## 2024-07-26 RX ADMIN — WARFARIN SODIUM 6 MG: 3 TABLET ORAL at 17:34

## 2024-07-26 RX ADMIN — SODIUM CHLORIDE, PRESERVATIVE FREE 10 ML: 5 INJECTION INTRAVENOUS at 07:59

## 2024-07-26 RX ADMIN — METOLAZONE 5 MG: 5 TABLET ORAL at 11:40

## 2024-07-26 RX ADMIN — HYDROCODONE BITARTRATE AND ACETAMINOPHEN 2 TABLET: 5; 325 TABLET ORAL at 15:43

## 2024-07-26 RX ADMIN — HYDRALAZINE HYDROCHLORIDE 100 MG: 50 TABLET ORAL at 13:06

## 2024-07-26 RX ADMIN — ALPRAZOLAM 0.5 MG: 0.5 TABLET ORAL at 20:04

## 2024-07-26 RX ADMIN — ATORVASTATIN CALCIUM 40 MG: 40 TABLET, FILM COATED ORAL at 20:04

## 2024-07-26 ASSESSMENT — PAIN DESCRIPTION - ORIENTATION
ORIENTATION: LOWER
ORIENTATION: MID;LOWER

## 2024-07-26 ASSESSMENT — PAIN DESCRIPTION - DESCRIPTORS
DESCRIPTORS: ACHING
DESCRIPTORS: ACHING;SHARP

## 2024-07-26 ASSESSMENT — PAIN SCALES - GENERAL
PAINLEVEL_OUTOF10: 6
PAINLEVEL_OUTOF10: 8
PAINLEVEL_OUTOF10: 9
PAINLEVEL_OUTOF10: 9
PAINLEVEL_OUTOF10: 6

## 2024-07-26 ASSESSMENT — ENCOUNTER SYMPTOMS
CONSTIPATION: 0
VOMITING: 0
DIARRHEA: 0
NAUSEA: 0
SHORTNESS OF BREATH: 1

## 2024-07-26 ASSESSMENT — PAIN DESCRIPTION - LOCATION
LOCATION: BACK
LOCATION: BACK

## 2024-07-26 NOTE — PROGRESS NOTES
Warfarin Pharmacy Consult Note    Warfarin Indication: Atrial fibrillation or Atrial flutter  Target INR: 2.0-3.0  Dose prior to admission: 2.5mg MF, 5mg all other days    Recent Labs     07/24/24  0841 07/25/24  0550 07/26/24  0613   HGB 8.4* 8.1* 8.8*    135 144     Recent Labs     07/24/24  0841 07/25/24  0550 07/26/24  0613   INR 1.87* 1.74* 1.80*     Concurrent anticoagulants/antiplatelets: Lovenox, Brilinta  Significant warfarin drug-drug interactions: Amiodarone     Date INR Warfarin Dose   7/23/24 1.82 5 mg    7/24/24 1.87  6 mg    7/25/24   1.74 7.5 mg    7/26/24   1.8 6 mg                                Monitoring:                   INR will be monitored daily until therapeutic INR is achieved.    **Please contact pharmacy for discharge instructions when indicated**    Stephenie Saxena R.Ph., BCPS., 7/26/2024,8:15 AM

## 2024-07-26 NOTE — ADT AUTH CERT
Comments  [facility]  [facility] 8AB  [address] [address]  [id number]  [id number]      **NOTES:  PLEASE START IP AUTH; PT [facility]      RETURN CONTACT INFORMATION    NAME: REGLA GRIFFIN  PHONE: [phone]  FAX: [phone]    Auth number: N/A  MEMBER ID[id number] [facility]           Last edited by Mag Leon on 24 at 1926     Alessio Duncan #[id number] (CSN:[id number]) (:1965 59 y.o. M) (Adm: 24)  STRZ [id number]  PCP    CELINE CLEVELAND  Demographics  CommentAddress    [address] [facility] [address]    Home Phone   [phone]    Work Phone       Mobile Phone   [phone]             Social Security Number       Insurance Information   BCBS    Marital Status       Holiness   Hindu (STRZ NONE)               Admission Information    Arrival Date/Time: 2024 Admit Date/Time: 2024 1940 IP Adm. Date/Time: 2024 0032   Admission Type: Emergency Point of Origin: Non-health Care Facility/self Admit Category:    Means of Arrival: Walk In Primary Service: Internal Medicine Secondary Service: N/A   Transfer Source:  Service Area: [facility] Unit: STR[facility] MED SURG 8AB   Admit Provider:  Attending Provider: Serge Gunn DO Referring Provider:      Patient Diagnoses    Reasons for Admission Coded Admission Diagnoses    *Shortness of breath [R06.02]    Pitting edema [R60.9]    Acute on chronic systolic CHF (congestive heart failure) (HCC) [I50.23]    HFrEF (heart failure with reduced ejection fraction) (HCC) [I50.20]    Congestive heart failure, unspecified HF chronicity, unspecified heart failure type (HCC) [I50.9]     [place] Status   Yes [001]     Insurance Payors as of 2024    BCBS    Plan: BCBS OUT OF STATE Group: [id number] Member: [id number]   Effective from: 2015 Subscriber: ALESSIO DUNCAN Subscriber ID: [id number]   Guarantor: ALESSIO DUNCAN  po every 4 hours prn x 4        ORDERS:  Admit to IP  Tele 48 hours  PT/OT eval and tx  ECHO  Vitals  Hypoglycemia tx  Up as tolerated  ADULT DIET; Regular; 5 carb choices (75 gm/meal); Low Sodium (2 gm); 2000 ml         PT/OT/SLP/CM/RN ASSESSMENTS or NOTES:  PT   AM-PAC Inpatient Mobility without Stair Climbing Raw Score : 20  AM-PAC Inpatient without Stair Climbing T-Scale Score : 60.57     OT  AM-PAC Inpatient Daily Activity Raw Score: 22  AM-PAC Inpatient ADL T-Scale Score : 47.1

## 2024-07-26 NOTE — CARE COORDINATION
7/26/24, 7:38 AM EDT    DISCHARGE ON GOING EVALUATION    [facility] day: 3  Location: 8A-19/019-A Reason for admit: Shortness of breath [R06.02]  Pitting edema [R60.9]  Acute on chronic systolic CHF (congestive heart failure) ([facility]) [I50.23]  HFrEF (heart failure with reduced ejection fraction) ([facility]) [I50.20]  Congestive heart failure, unspecified HF chronicity, unspecified heart failure type ([facility]) [I50.9]     Procedures: 7/23 Echo: ef 25%     Imaging since last note: 7/24 CXR: 1. Moderate cardiomegaly. Poor inflation of the lungs. Kyphotic positioning of   the patient. Permanent pacemaker/defibrillator. Metallic sternotomy sutures.   2. Mild residual pneumonia/edema involving both lungs relatively diffusely,   improved from prior.   3. Questionable mild atelectasis/pneumonia left parahilar region.     Barriers to Discharge: Hospitalist and Cardiology following.   Creat 3.5 today, 3.4 yesterday.   Consult to EP cardiology to determined need for amio/mexilitine.   Bumex gtt.  Pain control.      PCP: Leonel Adorno APRN - CNP  Readmission Risk Score: 40.4    Patient Goals/Plan/Treatment Preferences: Return home alone, denied needs.        Potential discharge over the weekend.  7/26/24, 7:43 AM EDT    Patient goals/plan/ treatment preferences discussed by  and .  Patient goals/plan/ treatment preferences reviewed with patient/ family.  Patient/ family verbalize understanding of discharge plan and are in agreement with goal/plan/treatment preferences.  Understanding was demonstrated using the teach back method.  AVS provided by RN at time of discharge, which includes all necessary medical information pertaining to the patients current course of illness, treatment, post-discharge goals of care, and treatment preferences.     Services At/After Discharge: None

## 2024-07-26 NOTE — PROGRESS NOTES
Hospitalist Progress Note      Patient:  Garrett Duncan 59 y.o. male       : 1965  Unit/Bed:8A-/019-A    Date of Admission: 2024      ASSESSMENT AND PLAN    Active Problems  Acute on chronic HFrEF  Dilated ICMP  Echo 24- EF 25% (30-35% per TTE 2024). Cardiology following. Not a significant change to require further workup. Has AICD.   Resume Bumex gtt, increase to 1mg/hr for the next 6hrs, followed by 0.5mg/hr for 12 hrs. Repeat BMP this evening and in AM.   Metolazone 5mg x 1 added per Cardiology.   Strict I/Os. Daily standing weights.   Fluid/salt restriction.   Wrap BLE.   Needs to follow up with CHF clinic at [place].   CKD stage IV  Likely underlying hypertensive nephrosclerosis   Cr has remained stable with baseline with IV diuresis.   Electrolytes stable. Does not require nephrology consult at this time.   Cont to monitor BMP and lytes daily.   Non-sustained Vtach   H/o NSVT, s/p ablation in .   8 beat run of VT .   Cardiology following - consulted EPS to determine need for current amio/mexilitine   Keep Mg >2, K >4.   Paroxysmal AFib  H/o CV 2022, h/o ablation.   Rate controlled on amiodarone, metoprolol.  Anticoagulated on Coumadin.  INR subtherapeutic, currently on Lovenox.    Resolved Problems  Elevated troponin    Cardio following. Chronic and demand related.       Chronic Conditions (reviewed, stable, and home medications resumed, unless otherwise stated)  Chronic anemia: stable with baseline. No signs of bleeding.  Did require PRBC transfusion during previous admission, was given referral for outpatient gastroenterology.  Chronic lumbar pain: L4 1 to L5 degenerative disc disease with stenosis and claudication patient states he has anticipated orthopedic intervention 2024   Medical noncompliance       LDA: []CVC / []PICC / []Midline / []Mason / []Drains / []Mediport / [x]None  Antibiotics: no  Steroids: no  Labs (still needed?): [x]Yes / []No  IVF (still

## 2024-07-26 NOTE — PROGRESS NOTES
mg, Q6H PRN   Or  acetaminophen, 650 mg, Q6H PRN  melatonin, 3 mg, Nightly PRN  glucose, 4 tablet, PRN  dextrose bolus, 125 mL, PRN   Or  dextrose bolus, 250 mL, PRN  glucagon (rDNA), 1 mg, PRN  dextrose, , Continuous PRN  HYDROcodone 5 mg - acetaminophen, 1 tablet, Q4H PRN   Or  HYDROcodone 5 mg - acetaminophen, 2 tablet, Q4H PRN        Diagnostics:  TTE 7/23/24    Left Ventricle: Severely reduced left ventricular systolic function with a visually estimated EF of 25%. Left ventricle is moderately dilated. Normal wall thickness. Severe global hypokinesis present.    Left Atrium: Left atrium is severely dilated.    Image quality is adequate.      Lab Data:    Cardiac Enzymes:  No results for input(s): \"CKTOTAL\", \"CKMB\", \"CKMBINDEX\", \"TROPONINI\" in the last 72 hours.    CBC:   Lab Results   Component Value Date/Time    WBC 5.5 07/26/2024 06:13 AM    RBC 3.32 07/26/2024 06:13 AM    RBC 4.75 11/03/2011 04:59 PM    HGB 8.8 07/26/2024 06:13 AM    HCT 30.5 07/26/2024 06:13 AM     07/26/2024 06:13 AM       CMP:    Lab Results   Component Value Date/Time     07/26/2024 06:13 AM    K 4.3 07/26/2024 06:13 AM    K 4.3 07/22/2024 08:10 PM     07/26/2024 06:13 AM    CO2 23 07/26/2024 06:13 AM    BUN 48 07/26/2024 06:13 AM    CREATININE 3.5 07/26/2024 06:13 AM    LABGLOM 19 07/26/2024 06:13 AM    LABGLOM 17 04/29/2024 10:20 AM    GLUCOSE 196 07/26/2024 06:13 AM    CALCIUM 8.2 07/26/2024 06:13 AM       Hepatic Function Panel:    Lab Results   Component Value Date/Time    ALKPHOS 92 07/22/2024 08:10 PM    ALT 11 07/22/2024 08:10 PM    AST 17 07/22/2024 08:10 PM    BILITOT 0.5 07/22/2024 08:10 PM    BILIDIR 0.2 07/22/2024 08:10 PM       Magnesium:    Lab Results   Component Value Date/Time    MG 2.1 07/26/2024 06:13 AM       PT/INR:    Lab Results   Component Value Date/Time    PROTIME 35.5 04/01/2024 01:25 PM    INR 1.80 07/26/2024 06:13 AM       HgBA1c:    Lab Results   Component Value Date/Time    LABA1C 6.7  06/13/2024 01:40 PM       FLP:    Lab Results   Component Value Date/Time    TRIG 103 03/11/2024 04:27 AM    HDL 39 03/11/2024 04:27 AM       TSH:    Lab Results   Component Value Date/Time    TSH 6.130 04/06/2024 05:24 AM         Assessment:    Acute on chronic HFrEF, volume overload  Dilated ICMP - ef 25 per TTE 7/23/24, ef 30-35 per TTE 3/2024  Hx AICD  CKD  Cardiorenal  Elevated troponins - chronic and demand related  CAD - hx CABG, hx PCI LAD and Dg 2020  PAF   Hx CV 4/2022   Hx afib ablation   On coumadin PTA  Hx VT ablation, hx NSVT  HTN  HLD  Anemia - attending following  Hx ETOH abuse  Hx noncompliance  with meds      Plan:    Daily I/o and weights  2 liter fluid restriction and 2gm sodium diet  Keep mag >2 and k >4  Cont diuresis - recommend nephrology consult to assist with further diuresis and ckd management   Give zaroxolyn 5mg po x1  Daily BMP  Elevate legs/nidia hose  Referral to chf clinic - ordered  Recommend nephrology consult to assist with diuresis in setting of CHF/CKD/cardiorenal/fluid overload  Cont brilinta/statin/BB/imdur/hydralazine/amio/ace  Mexilitine stopped 2/7/24 per dr deleon note - reached out to dr daniels, he states to stop mexilitine and he will follow as OP  Restart telemetry - discussed with nurse  Will follow prn  Call with any questions/concerns  F/up dr rucker 4 weeks  F/up chf clinic 2 weeks       Electronically signed by Vikki Jackson PA-C on 7/26/2024 at 9:40 AM

## 2024-07-27 VITALS
HEIGHT: 73 IN | BODY MASS INDEX: 36.6 KG/M2 | TEMPERATURE: 98.2 F | OXYGEN SATURATION: 95 % | HEART RATE: 64 BPM | WEIGHT: 276.2 LBS | RESPIRATION RATE: 16 BRPM | DIASTOLIC BLOOD PRESSURE: 89 MMHG | SYSTOLIC BLOOD PRESSURE: 129 MMHG

## 2024-07-27 LAB
ANION GAP SERPL CALC-SCNC: 12 MEQ/L (ref 8–16)
BUN SERPL-MCNC: 53 MG/DL (ref 7–22)
CALCIUM SERPL-MCNC: 8.1 MG/DL (ref 8.5–10.5)
CHLORIDE SERPL-SCNC: 98 MEQ/L (ref 98–111)
CO2 SERPL-SCNC: 26 MEQ/L (ref 23–33)
CREAT SERPL-MCNC: 3.5 MG/DL (ref 0.4–1.2)
DEPRECATED RDW RBC AUTO: 62.4 FL (ref 35–45)
ERYTHROCYTE [DISTWIDTH] IN BLOOD BY AUTOMATED COUNT: 20.5 % (ref 11.5–14.5)
GFR SERPL CREATININE-BSD FRML MDRD: 19 ML/MIN/1.73M2
GLUCOSE BLD STRIP.AUTO-MCNC: 193 MG/DL (ref 70–108)
GLUCOSE BLD STRIP.AUTO-MCNC: 236 MG/DL (ref 70–108)
GLUCOSE BLD STRIP.AUTO-MCNC: 237 MG/DL (ref 70–108)
GLUCOSE BLD STRIP.AUTO-MCNC: 367 MG/DL (ref 70–108)
GLUCOSE SERPL-MCNC: 225 MG/DL (ref 70–108)
HCT VFR BLD AUTO: 29.5 % (ref 42–52)
HGB BLD-MCNC: 8.6 GM/DL (ref 14–18)
INR PPP: 2.19 (ref 0.85–1.13)
MAGNESIUM SERPL-MCNC: 2 MG/DL (ref 1.6–2.4)
MCH RBC QN AUTO: 26.5 PG (ref 26–33)
MCHC RBC AUTO-ENTMCNC: 29.2 GM/DL (ref 32.2–35.5)
MCV RBC AUTO: 91 FL (ref 80–94)
PHOSPHATE SERPL-MCNC: 4.3 MG/DL (ref 2.4–4.7)
PLATELET # BLD AUTO: 149 THOU/MM3 (ref 130–400)
PMV BLD AUTO: 10.5 FL (ref 9.4–12.4)
POTASSIUM SERPL-SCNC: 4.1 MEQ/L (ref 3.5–5.2)
RBC # BLD AUTO: 3.24 MILL/MM3 (ref 4.7–6.1)
SODIUM SERPL-SCNC: 136 MEQ/L (ref 135–145)
WBC # BLD AUTO: 5.5 THOU/MM3 (ref 4.8–10.8)

## 2024-07-27 PROCEDURE — 83735 ASSAY OF MAGNESIUM: CPT

## 2024-07-27 PROCEDURE — 6360000002 HC RX W HCPCS: Performed by: PHYSICIAN ASSISTANT

## 2024-07-27 PROCEDURE — 36415 COLL VENOUS BLD VENIPUNCTURE: CPT

## 2024-07-27 PROCEDURE — 82948 REAGENT STRIP/BLOOD GLUCOSE: CPT

## 2024-07-27 PROCEDURE — 6370000000 HC RX 637 (ALT 250 FOR IP): Performed by: INTERNAL MEDICINE

## 2024-07-27 PROCEDURE — 80048 BASIC METABOLIC PNL TOTAL CA: CPT

## 2024-07-27 PROCEDURE — 85027 COMPLETE CBC AUTOMATED: CPT

## 2024-07-27 PROCEDURE — 85610 PROTHROMBIN TIME: CPT

## 2024-07-27 PROCEDURE — 6370000000 HC RX 637 (ALT 250 FOR IP)

## 2024-07-27 PROCEDURE — 6360000002 HC RX W HCPCS

## 2024-07-27 PROCEDURE — 84100 ASSAY OF PHOSPHORUS: CPT

## 2024-07-27 PROCEDURE — 2580000003 HC RX 258: Performed by: PHYSICIAN ASSISTANT

## 2024-07-27 PROCEDURE — 6370000000 HC RX 637 (ALT 250 FOR IP): Performed by: PHYSICIAN ASSISTANT

## 2024-07-27 RX ORDER — METOLAZONE 2.5 MG/1
2.5 TABLET ORAL DAILY PRN
Qty: 30 TABLET | Refills: 0 | Status: SHIPPED | OUTPATIENT
Start: 2024-07-27

## 2024-07-27 RX ORDER — BUMETANIDE 1 MG/1
2 TABLET ORAL 2 TIMES DAILY
Status: DISCONTINUED | OUTPATIENT
Start: 2024-07-27 | End: 2024-07-27 | Stop reason: HOSPADM

## 2024-07-27 RX ORDER — LISINOPRIL 5 MG/1
5 TABLET ORAL DAILY
Qty: 30 TABLET | Refills: 0 | Status: SHIPPED | OUTPATIENT
Start: 2024-07-28

## 2024-07-27 RX ORDER — WARFARIN SODIUM 5 MG/1
5 TABLET ORAL ONCE
Status: COMPLETED | OUTPATIENT
Start: 2024-07-27 | End: 2024-07-27

## 2024-07-27 RX ORDER — BUMETANIDE 2 MG/1
2 TABLET ORAL 2 TIMES DAILY
Qty: 60 TABLET | Refills: 0 | Status: SHIPPED | OUTPATIENT
Start: 2024-07-27

## 2024-07-27 RX ORDER — DICYCLOMINE HYDROCHLORIDE 10 MG/1
10 CAPSULE ORAL
COMMUNITY
Start: 2024-07-27

## 2024-07-27 RX ADMIN — ENOXAPARIN SODIUM 120 MG: 150 INJECTION SUBCUTANEOUS at 10:11

## 2024-07-27 RX ADMIN — BUMETANIDE 0.5 MG/HR: 0.25 INJECTION INTRAMUSCULAR; INTRAVENOUS at 05:10

## 2024-07-27 RX ADMIN — HYDRALAZINE HYDROCHLORIDE 100 MG: 50 TABLET ORAL at 15:16

## 2024-07-27 RX ADMIN — HYDROCODONE BITARTRATE AND ACETAMINOPHEN 2 TABLET: 5; 325 TABLET ORAL at 10:17

## 2024-07-27 RX ADMIN — HYDRALAZINE HYDROCHLORIDE 100 MG: 50 TABLET ORAL at 10:12

## 2024-07-27 RX ADMIN — WARFARIN SODIUM 5 MG: 5 TABLET ORAL at 16:02

## 2024-07-27 RX ADMIN — HYDROCODONE BITARTRATE AND ACETAMINOPHEN 2 TABLET: 5; 325 TABLET ORAL at 15:15

## 2024-07-27 RX ADMIN — ISOSORBIDE MONONITRATE 120 MG: 60 TABLET, EXTENDED RELEASE ORAL at 10:12

## 2024-07-27 RX ADMIN — INSULIN LISPRO 4 UNITS: 100 INJECTION, SOLUTION INTRAVENOUS; SUBCUTANEOUS at 10:12

## 2024-07-27 RX ADMIN — AMIODARONE HYDROCHLORIDE 200 MG: 200 TABLET ORAL at 10:12

## 2024-07-27 RX ADMIN — BUMETANIDE 2 MG: 1 TABLET ORAL at 11:26

## 2024-07-27 RX ADMIN — LISINOPRIL 5 MG: 5 TABLET ORAL at 10:12

## 2024-07-27 RX ADMIN — METOPROLOL SUCCINATE 100 MG: 100 TABLET, EXTENDED RELEASE ORAL at 10:12

## 2024-07-27 RX ADMIN — INSULIN LISPRO 1 UNITS: 100 INJECTION, SOLUTION INTRAVENOUS; SUBCUTANEOUS at 11:34

## 2024-07-27 RX ADMIN — INSULIN LISPRO 1 UNITS: 100 INJECTION, SOLUTION INTRAVENOUS; SUBCUTANEOUS at 16:07

## 2024-07-27 RX ADMIN — TICAGRELOR 90 MG: 90 TABLET ORAL at 10:11

## 2024-07-27 RX ADMIN — BUMETANIDE 2 MG: 1 TABLET ORAL at 16:02

## 2024-07-27 ASSESSMENT — PAIN SCALES - GENERAL
PAINLEVEL_OUTOF10: 7
PAINLEVEL_OUTOF10: 7

## 2024-07-27 ASSESSMENT — PAIN DESCRIPTION - LOCATION
LOCATION: BACK;HIP
LOCATION: HIP;BACK

## 2024-07-27 ASSESSMENT — PAIN DESCRIPTION - ORIENTATION: ORIENTATION: RIGHT;LEFT;LOWER

## 2024-07-27 NOTE — PLAN OF CARE
Problem: Discharge Planning  Goal: Discharge to home or other facility with appropriate resources  Outcome: Progressing  Flowsheets (Taken 7/26/2024 2000)  Discharge to home or other facility with appropriate resources: Identify barriers to discharge with patient and caregiver     Problem: Pain  Goal: Verbalizes/displays adequate comfort level or baseline comfort level  Outcome: Progressing     Problem: ABCDS Injury Assessment  Goal: Absence of physical injury  Outcome: Progressing     Problem: Safety - Adult  Goal: Free from fall injury  Outcome: Progressing     Problem: Chronic Conditions and Co-morbidities  Goal: Patient's chronic conditions and co-morbidity symptoms are monitored and maintained or improved  Outcome: Progressing  Flowsheets (Taken 7/26/2024 2000)  Care Plan - Patient's Chronic Conditions and Co-Morbidity Symptoms are Monitored and Maintained or Improved: Monitor and assess patient's chronic conditions and comorbid symptoms for stability, deterioration, or improvement     Problem: Respiratory - Adult  Goal: Achieves optimal ventilation and oxygenation  Outcome: Progressing  Flowsheets (Taken 7/26/2024 2000)  Achieves optimal ventilation and oxygenation: Assess for changes in respiratory status     Problem: Skin/Tissue Integrity - Adult  Goal: Incisions, wounds, or drain sites healing without S/S of infection  Outcome: Progressing     Problem: Metabolic/Fluid and Electrolytes - Adult  Goal: Electrolytes maintained within normal limits  Outcome: Progressing  Flowsheets (Taken 7/26/2024 2000)  Electrolytes maintained within normal limits: Monitor labs and assess patient for signs and symptoms of electrolyte imbalances     Problem: Hematologic - Adult  Goal: Maintains hematologic stability  Outcome: Progressing  Flowsheets (Taken 7/26/2024 2000)  Maintains hematologic stability: Assess for signs and symptoms of bleeding or hemorrhage

## 2024-07-27 NOTE — PROGRESS NOTES
Clinical Pharmacy Note                                               Warfarin Discharge Recommendations    Patient discharged from [facility] today on warfarin.    Warfarin indication: Atrial fibrillation or Atrial flutter  INR goal during admission: 2.0-3.0  Previous home warfarin regimen: 2.5 mg MF and 5 mg [id number]  Interacting medications at discharge: amiodarone, brilinta   Coumadin 5 mg tabs    Hospital Warfarin Doses & INR Results  Date INR Warfarin Dose   7/23/24 1.82 5 mg    7/24/24 1.87  6 mg    7/25/24   1.74 7.5 mg    7/26/24   1.8 6 mg     7/27/24 2.19  5 mg                      Recent INRs:  Recent Labs     07/27/24  0605   INR 2.19*     Warfarin Discharge Instructions:   Date Warfarin Dose   7/28/24 (tomorrow) 5 mg (1 tablet)   7/29/24 INR     Provider dosing warfarin: [facility]  Next INR date: 7/29/24    Chris Richardson, MaurisioD, BCPS  7/27/2024  3:25 PM

## 2024-07-29 ENCOUNTER — TELEPHONE (OUTPATIENT)
Dept: FAMILY MEDICINE CLINIC | Age: 59
End: 2024-07-29

## 2024-07-29 ENCOUNTER — TELEPHONE (OUTPATIENT)
Dept: CARDIOLOGY CLINIC | Age: 59
End: 2024-07-29

## 2024-07-29 ENCOUNTER — CARE COORDINATION (OUTPATIENT)
Dept: CASE MANAGEMENT | Age: 59
End: 2024-07-29

## 2024-07-29 NOTE — CARE COORDINATION
Care Transitions Note    Initial Call - Call within 2 business days of discharge: Yes    Attempted to reach patient for transitions of care follow up. Unable to reach patient.    Outreach Attempts:   HIPAA compliant voicemail left for patient.     1st attempt to contact pt for initial care transition follow up.  Pt is a 7 day readmission.  Unable to reach pt.  Left message with contact information and request for call back.      Contacted [facility].  Spoke with female pharmacy tech.  She confirmed Bumex, Bentyl, Lisinopril and Metolazone were picked up.      Contacted [facility].  Spoke with Meredith.  Informed her that pt was discharged from [facility] on 24.  Per AVS, pt's next INR on 24.  She states he has an appt on 24.  Will check to see if he can get in sooner.      Patient: Garrett Duncan    Patient : 1965   MRN: [id number]    Reason for Admission: Edema, SOB, Acute on chronic systolic CHF  Discharge Date: 24  RURS: Readmission Risk Score: 39    Last Discharge Facility       Date Complaint Diagnosis Description Type Department Provider    24 Edema; Shortness of Breath Acute on chronic systolic CHF (congestive heart failure) (HCC) ... ED to Hosp-Admission (Discharged) (ADMITTED) STR 8AB Radha Villa PA-C; Serge Gunn,...            Was this an external facility discharge? No    Follow Up Appointment:   Patient has hospital follow up appointment scheduled within 7 days of discharge.    Future Appointments         Provider Specialty Dept Phone    2024 11:00 AM Leonel Adorno APRN - CNP Family Medicine [phone]    2024 1:00 PM Samuel Kathleen MD Nephrology [phone]    2024 9:30 AM Anika Askew APRN - CNP Oncology [phone]    2024 3:30 PM [facility] PULMONARY FUNCTION ROOM 1 Pulmonary Function Testing [phone]    2024 3:30 PM Leonel Adorno APRN - CNP Family Medicine [phone]    2024 3:30 PM

## 2024-07-29 NOTE — TELEPHONE ENCOUNTER
Care Transitions Initial Follow Up Call    Outreach made within 2 business days of discharge: Yes    Patient: Garrett Duncan Patient : 1965   MRN: [id number]  Reason for Admission: heart failure with reduced ejection fraction  Discharge Date: 24       Spoke with: LVM    Discharge department/facility: [facility]        Scheduled appointment with PCP within 7-14 days    Follow Up  Future Appointments   Date Time Provider Department Center   2024 11:00 AM Leonel Adorno APRN - CNP [place] [facility] [place] [facility]   2024  1:00 PM Samuel Kathleen MD N [place] [facility]   2024  9:30 AM Anika Askew APRN - CNP N Oncology [facility]   2024  3:30 PM [facility] PULMONARY FUNCTION ROOM 1 [facility] [place]   2024  3:30 PM Leonel Adorno APRN - CNP [place] [facility] [place] [facility]   2024  3:30 PM Shelly Johnson MD N [facility]   2025  1:45 PM [facility]       Queta Steven CMA ([facility])

## 2024-07-29 NOTE — TELEPHONE ENCOUNTER
Received IP CHF consult. Need to schedule new patient appt.  Called to patient, no answer. [facility].

## 2024-07-30 ENCOUNTER — CARE COORDINATION (OUTPATIENT)
Dept: CASE MANAGEMENT | Age: 59
End: 2024-07-30

## 2024-07-30 NOTE — CARE COORDINATION
Care Transitions Note    Initial Call - Call within 2 business days of discharge: Yes    Attempted to reach patient for transitions of care follow up. Unable to reach patient.    Outreach Attempts:   HIPAA compliant voicemail left for patient.     2nd attempt to contact for initial care transition follow up.  Unable to reach pt.  Left message with contact information and request for call back.      Program will be closed and CTN to sign off if return call is not received from the patient.     Sent staff message to LIZZY Stern with hand off update.       Patient: Garrett Duncan    Patient : 1965   MRN: [id number]    Reason for Admission: Edema, SOB, Acute on chronic CHF  Discharge Date: 24  RURS: Readmission Risk Score: 39    Last Discharge Facility       Date Complaint Diagnosis Description Type Department Provider    24 Edema; Shortness of Breath Acute on chronic systolic CHF (congestive heart failure) (HCC) ... ED to Hosp-Admission (Discharged) (ADMITTED) [facility] 8AB Radha Villa PA-C; Serge Gunn,...            Was this an external facility discharge? No    Follow Up Appointment:   Patient has hospital follow up appointment scheduled within 7 days of discharge.    Future Appointments         Provider Specialty Dept Phone    2024 11:00 AM Leonel Adorno APRN - CNP Family Medicine [phone]    2024 1:00 PM Samuel Kathleen MD Nephrology [phone]    2024 9:15 AM STR OUT PT ONC CMA Infusion Therapy [phone]    2024 9:30 AM Anika Askew APRN - CNP Oncology [phone]    2024 3:30 PM STR PULMONARY FUNCTION ROOM 1 Pulmonary Function Testing [phone]    2024 3:30 PM Leonel Adorno APRN - CNP Family Medicine [phone]    2024 3:30 PM Shelly Johnson MD Cardiology [phone]    2025 1:45 PM Fany Guzmán MD Cardiology [phone]            Patient referred back to AFUA Ramesh for continued management.     Jennifer

## 2024-07-31 NOTE — PROGRESS NOTES
Physician Progress Note      PATIENT:               ALESSIO SARABIA  CSN #:                  [id number]  :                       1965  ADMIT DATE:       2024 10:14 AM  DISCH DATE:        2024 6:38 PM  RESPONDING  PROVIDER #:        Juju Ramirez PA-C          QUERY TEXT:    Patient admitted with Anemia, noted to have Paroxysmal atrial fibrillation and   is maintained on Coumadin. If possible, please document in progress notes and   discharge summary if you are evaluating and/or treating any of the   following:?  ?  The medical record reflects the following:  Risk Factors: HTN, DM, CHF  Clinical Indicators: DS  \"PAF on OAC (subtherapeutic INR), Chronically on   Coumadin\".  Treatment: Coumadin.    Thanks,  SAMSON Lind, CDI.  Options provided:  -- Secondary hypercoagulable state in a patient with atrial fibrillation  -- Other - I will add my own diagnosis  -- Disagree - Not applicable / Not valid  -- Disagree - Clinically unable to determine / Unknown  -- Refer to Clinical Documentation Reviewer    PROVIDER RESPONSE TEXT:    This patient has secondary hypercoagulable state in a patient with atrial   fibrillation.    Query created by: Jennifer Campos on 2024 1:14 AM      Electronically signed by:  Juju Ramirez PA-C 2024 5:55 PM

## 2024-08-06 ENCOUNTER — HOSPITAL ENCOUNTER (INPATIENT)
Age: 59
LOS: 3 days | Discharge: HOME | End: 2024-08-09
Attending: INTERNAL MEDICINE
Payer: COMMERCIAL

## 2024-08-06 ENCOUNTER — APPOINTMENT (OUTPATIENT)
Dept: GENERAL RADIOLOGY | Age: 59
End: 2024-08-06
Payer: COMMERCIAL

## 2024-08-06 DIAGNOSIS — E83.42 HYPOMAGNESEMIA: ICD-10-CM

## 2024-08-06 DIAGNOSIS — I10 UNCONTROLLED HYPERTENSION: ICD-10-CM

## 2024-08-06 DIAGNOSIS — F17.210 CIGARETTE SMOKER: ICD-10-CM

## 2024-08-06 DIAGNOSIS — R26.2 DIFFICULTY IN WALKING: ICD-10-CM

## 2024-08-06 DIAGNOSIS — M48.062 LUMBAR STENOSIS WITH NEUROGENIC CLAUDICATION: ICD-10-CM

## 2024-08-06 DIAGNOSIS — R73.9 HYPERGLYCEMIA: ICD-10-CM

## 2024-08-06 DIAGNOSIS — M54.9 INTRACTABLE BACK PAIN: Primary | ICD-10-CM

## 2024-08-06 PROBLEM — M54.50 LOWER BACK PAIN: Status: ACTIVE | Noted: 2024-08-06

## 2024-08-06 LAB
AMPHETAMINES UR QL SCN: NEGATIVE
ANION GAP SERPL CALC-SCNC: 18 MEQ/L (ref 8–16)
ANISOCYTOSIS BLD QL SMEAR: PRESENT
BACTERIA URNS QL MICRO: ABNORMAL /HPF
BARBITURATES UR QL SCN: NEGATIVE
BASE EXCESS BLDA CALC-SCNC: -1.5 MMOL/L (ref -2–3)
BASOPHILS ABSOLUTE: 0.1 THOU/MM3 (ref 0–0.1)
BASOPHILS NFR BLD AUTO: 1.5 %
BENZODIAZ UR QL SCN: NEGATIVE
BILIRUB UR QL STRIP.AUTO: NEGATIVE
BUN SERPL-MCNC: 69 MG/DL (ref 7–22)
BZE UR QL SCN: NEGATIVE
CALCIUM SERPL-MCNC: 8.2 MG/DL (ref 8.5–10.5)
CANNABINOIDS UR QL SCN: NEGATIVE
CASTS #/AREA URNS LPF: ABNORMAL /LPF
CASTS 2: ABNORMAL /LPF
CHARACTER UR: CLEAR
CHLORIDE SERPL-SCNC: 102 MEQ/L (ref 98–111)
CO2 SERPL-SCNC: 19 MEQ/L (ref 23–33)
COLLECTED BY:: ABNORMAL
COLOR, UA: YELLOW
CREAT SERPL-MCNC: 3.8 MG/DL (ref 0.4–1.2)
CREAT UR-MCNC: 60.7 MG/DL
CRYSTALS URNS MICRO: ABNORMAL
DEPRECATED RDW RBC AUTO: 67.8 FL (ref 35–45)
DEVICE: ABNORMAL
EKG ATRIAL RATE: 70 BPM
EKG P AXIS: 90 DEGREES
EKG P-R INTERVAL: 222 MS
EKG Q-T INTERVAL: 440 MS
EKG QRS DURATION: 92 MS
EKG QTC CALCULATION (BAZETT): 475 MS
EKG R AXIS: 24 DEGREES
EKG T AXIS: 128 DEGREES
EKG VENTRICULAR RATE: 70 BPM
EOSINOPHIL NFR BLD AUTO: 3.3 %
EOSINOPHILS ABSOLUTE: 0.2 THOU/MM3 (ref 0–0.4)
EPITHELIAL CELLS, UA: ABNORMAL /HPF
ERYTHROCYTE [DISTWIDTH] IN BLOOD BY AUTOMATED COUNT: 21.7 % (ref 11.5–14.5)
ETHANOL SERPL-MCNC: 0.04 % (ref 0–0.08)
FENTANYL: NEGATIVE
GFR SERPL CREATININE-BSD FRML MDRD: 17 ML/MIN/1.73M2
GLUCOSE BLD STRIP.AUTO-MCNC: 162 MG/DL (ref 70–108)
GLUCOSE BLD STRIP.AUTO-MCNC: 194 MG/DL (ref 70–108)
GLUCOSE SERPL-MCNC: 249 MG/DL (ref 70–108)
GLUCOSE UR QL STRIP.AUTO: 500 MG/DL
HCO3 BLDA-SCNC: 24 MMOL/L (ref 23–28)
HCT VFR BLD AUTO: 29.6 % (ref 42–52)
HGB BLD-MCNC: 9 GM/DL (ref 14–18)
HGB UR QL STRIP.AUTO: ABNORMAL
IMM GRANULOCYTES # BLD AUTO: 0.03 THOU/MM3 (ref 0–0.07)
IMM GRANULOCYTES NFR BLD AUTO: 0.4 %
KETONES UR QL STRIP.AUTO: NEGATIVE
LYMPHOCYTES ABSOLUTE: 0.8 THOU/MM3 (ref 1–4.8)
LYMPHOCYTES NFR BLD AUTO: 11.1 %
MAGNESIUM SERPL-MCNC: 2.9 MG/DL (ref 1.6–2.4)
MCH RBC QN AUTO: 27.5 PG (ref 26–33)
MCHC RBC AUTO-ENTMCNC: 30.4 GM/DL (ref 32.2–35.5)
MCV RBC AUTO: 90.5 FL (ref 80–94)
MISCELLANEOUS 2: ABNORMAL
MONOCYTES ABSOLUTE: 0.4 THOU/MM3 (ref 0.4–1.3)
MONOCYTES NFR BLD AUTO: 5.8 %
NEUTROPHILS ABSOLUTE: 5.8 THOU/MM3 (ref 1.8–7.7)
NEUTROPHILS NFR BLD AUTO: 77.9 %
NITRITE UR QL STRIP: NEGATIVE
NRBC BLD AUTO-RTO: 0 /100 WBC
NT-PROBNP SERPL IA-MCNC: ABNORMAL PG/ML (ref 0–124)
OPIATES UR QL SCN: NEGATIVE
OSMOLALITY UR: 380 MOSMOL/KG (ref 250–750)
OXYCODONE: NEGATIVE
PCO2 TEMP ADJ BLDMV: 45 MMHG (ref 41–51)
PCP UR QL SCN: NEGATIVE
PH BLDMV: 7.34 [PH] (ref 7.31–7.41)
PH UR STRIP.AUTO: 5.5 [PH] (ref 5–9)
PLATELET # BLD AUTO: 184 THOU/MM3 (ref 130–400)
PLATELET BLD QL SMEAR: ADEQUATE
PMV BLD AUTO: 9.1 FL (ref 9.4–12.4)
PO2 BLDMV: 70 MMHG (ref 25–40)
POIKILOCYTES: ABNORMAL
POLYCHROMASIA BLD QL SMEAR: ABNORMAL
POTASSIUM SERPL-SCNC: 4.1 MEQ/L (ref 3.5–5.2)
PROT UR STRIP.AUTO-MCNC: 300 MG/DL
RBC # BLD AUTO: 3.27 MILL/MM3 (ref 4.7–6.1)
RBC URINE: ABNORMAL /HPF
RENAL EPI CELLS #/AREA URNS HPF: ABNORMAL /[HPF]
SAO2 % BLDMV: 93 %
SCAN OF BLOOD SMEAR: NORMAL
SITE: ABNORMAL
SODIUM SERPL-SCNC: 139 MEQ/L (ref 135–145)
SODIUM UR-SCNC: 37 MEQ/L
SP GR UR REFRACT.AUTO: 1.01 (ref 1–1.03)
TROPONIN, HIGH SENSITIVITY: 250 NG/L (ref 0–12)
TROPONIN, HIGH SENSITIVITY: 255 NG/L (ref 0–12)
UROBILINOGEN, URINE: 1 EU/DL (ref 0–1)
UUN 24H UR-MCNC: 527 MG/DL
VENTILATION MODE VENT: ABNORMAL
WBC # BLD AUTO: 7.4 THOU/MM3 (ref 4.8–10.8)
WBC #/AREA URNS HPF: ABNORMAL /HPF
WBC #/AREA URNS HPF: NEGATIVE /[HPF]
YEAST LIKE FUNGI URNS QL MICRO: ABNORMAL

## 2024-08-06 PROCEDURE — 93005 ELECTROCARDIOGRAM TRACING: CPT | Performed by: INTERNAL MEDICINE

## 2024-08-06 PROCEDURE — 96372 THER/PROPH/DIAG INJ SC/IM: CPT

## 2024-08-06 PROCEDURE — 93005 ELECTROCARDIOGRAM TRACING: CPT

## 2024-08-06 PROCEDURE — 81001 URINALYSIS AUTO W/SCOPE: CPT

## 2024-08-06 PROCEDURE — 6360000002 HC RX W HCPCS

## 2024-08-06 PROCEDURE — 93010 ELECTROCARDIOGRAM REPORT: CPT | Performed by: INTERNAL MEDICINE

## 2024-08-06 PROCEDURE — 82948 REAGENT STRIP/BLOOD GLUCOSE: CPT

## 2024-08-06 PROCEDURE — 83935 ASSAY OF URINE OSMOLALITY: CPT

## 2024-08-06 PROCEDURE — 82803 BLOOD GASES ANY COMBINATION: CPT

## 2024-08-06 PROCEDURE — 82570 ASSAY OF URINE CREATININE: CPT

## 2024-08-06 PROCEDURE — 84540 ASSAY OF URINE/UREA-N: CPT

## 2024-08-06 PROCEDURE — 83880 ASSAY OF NATRIURETIC PEPTIDE: CPT

## 2024-08-06 PROCEDURE — 80307 DRUG TEST PRSMV CHEM ANLYZR: CPT

## 2024-08-06 PROCEDURE — 2500000003 HC RX 250 WO HCPCS

## 2024-08-06 PROCEDURE — 6370000000 HC RX 637 (ALT 250 FOR IP)

## 2024-08-06 PROCEDURE — 2060000000 HC ICU INTERMEDIATE R&B

## 2024-08-06 PROCEDURE — 73630 X-RAY EXAM OF FOOT: CPT

## 2024-08-06 PROCEDURE — 6360000002 HC RX W HCPCS: Performed by: PHYSICIAN ASSISTANT

## 2024-08-06 PROCEDURE — 6370000000 HC RX 637 (ALT 250 FOR IP): Performed by: PHYSICIAN ASSISTANT

## 2024-08-06 PROCEDURE — 84300 ASSAY OF URINE SODIUM: CPT

## 2024-08-06 PROCEDURE — 80048 BASIC METABOLIC PNL TOTAL CA: CPT

## 2024-08-06 PROCEDURE — 85025 COMPLETE CBC W/AUTO DIFF WBC: CPT

## 2024-08-06 PROCEDURE — 2700000000 HC OXYGEN THERAPY PER DAY

## 2024-08-06 PROCEDURE — 83735 ASSAY OF MAGNESIUM: CPT

## 2024-08-06 PROCEDURE — 99285 EMERGENCY DEPT VISIT HI MDM: CPT

## 2024-08-06 PROCEDURE — 99223 1ST HOSP IP/OBS HIGH 75: CPT | Performed by: INTERNAL MEDICINE

## 2024-08-06 PROCEDURE — 84484 ASSAY OF TROPONIN QUANT: CPT

## 2024-08-06 PROCEDURE — 82077 ASSAY SPEC XCP UR&BREATH IA: CPT

## 2024-08-06 PROCEDURE — 36415 COLL VENOUS BLD VENIPUNCTURE: CPT

## 2024-08-06 PROCEDURE — 6360000002 HC RX W HCPCS: Performed by: INTERNAL MEDICINE

## 2024-08-06 RX ORDER — ACETAMINOPHEN 650 MG/1
650 SUPPOSITORY RECTAL EVERY 6 HOURS PRN
Status: DISCONTINUED | OUTPATIENT
Start: 2024-08-06 | End: 2024-08-09 | Stop reason: HOSPADM

## 2024-08-06 RX ORDER — ACETAMINOPHEN 325 MG/1
650 TABLET ORAL EVERY 6 HOURS PRN
Status: DISCONTINUED | OUTPATIENT
Start: 2024-08-06 | End: 2024-08-09 | Stop reason: HOSPADM

## 2024-08-06 RX ORDER — HYDROXYZINE HYDROCHLORIDE 50 MG/ML
25 INJECTION, SOLUTION INTRAMUSCULAR EVERY 6 HOURS PRN
Status: DISCONTINUED | OUTPATIENT
Start: 2024-08-06 | End: 2024-08-07

## 2024-08-06 RX ORDER — BUMETANIDE 1 MG/1
2 TABLET ORAL 2 TIMES DAILY
Status: DISCONTINUED | OUTPATIENT
Start: 2024-08-06 | End: 2024-08-09 | Stop reason: HOSPADM

## 2024-08-06 RX ORDER — SODIUM CHLORIDE 9 MG/ML
INJECTION, SOLUTION INTRAVENOUS PRN
Status: DISCONTINUED | OUTPATIENT
Start: 2024-08-06 | End: 2024-08-09 | Stop reason: HOSPADM

## 2024-08-06 RX ORDER — HYDRALAZINE HYDROCHLORIDE 20 MG/ML
5 INJECTION INTRAMUSCULAR; INTRAVENOUS EVERY 6 HOURS PRN
Status: DISCONTINUED | OUTPATIENT
Start: 2024-08-06 | End: 2024-08-09 | Stop reason: HOSPADM

## 2024-08-06 RX ORDER — ONDANSETRON 2 MG/ML
4 INJECTION INTRAMUSCULAR; INTRAVENOUS EVERY 6 HOURS PRN
Status: DISCONTINUED | OUTPATIENT
Start: 2024-08-06 | End: 2024-08-09 | Stop reason: HOSPADM

## 2024-08-06 RX ORDER — LIDOCAINE 4 G/G
1 PATCH TOPICAL DAILY
Status: DISCONTINUED | OUTPATIENT
Start: 2024-08-06 | End: 2024-08-09 | Stop reason: HOSPADM

## 2024-08-06 RX ORDER — METOPROLOL SUCCINATE 100 MG/1
100 TABLET, EXTENDED RELEASE ORAL DAILY
Status: DISCONTINUED | OUTPATIENT
Start: 2024-08-06 | End: 2024-08-09 | Stop reason: HOSPADM

## 2024-08-06 RX ORDER — DICYCLOMINE HYDROCHLORIDE 10 MG/1
10 CAPSULE ORAL
Status: DISCONTINUED | OUTPATIENT
Start: 2024-08-06 | End: 2024-08-09 | Stop reason: HOSPADM

## 2024-08-06 RX ORDER — POLYETHYLENE GLYCOL 3350 17 G/17G
17 POWDER, FOR SOLUTION ORAL DAILY PRN
Status: DISCONTINUED | OUTPATIENT
Start: 2024-08-06 | End: 2024-08-09 | Stop reason: HOSPADM

## 2024-08-06 RX ORDER — DEXTROSE MONOHYDRATE 100 MG/ML
INJECTION, SOLUTION INTRAVENOUS CONTINUOUS PRN
Status: DISCONTINUED | OUTPATIENT
Start: 2024-08-06 | End: 2024-08-09 | Stop reason: HOSPADM

## 2024-08-06 RX ORDER — SODIUM CHLORIDE 0.9 % (FLUSH) 0.9 %
5-40 SYRINGE (ML) INJECTION PRN
Status: DISCONTINUED | OUTPATIENT
Start: 2024-08-06 | End: 2024-08-09 | Stop reason: HOSPADM

## 2024-08-06 RX ORDER — BUMETANIDE 0.25 MG/ML
1 INJECTION INTRAMUSCULAR; INTRAVENOUS 2 TIMES DAILY
Status: DISCONTINUED | OUTPATIENT
Start: 2024-08-06 | End: 2024-08-08

## 2024-08-06 RX ORDER — HYDRALAZINE HYDROCHLORIDE 50 MG/1
100 TABLET, FILM COATED ORAL 3 TIMES DAILY
Status: DISCONTINUED | OUTPATIENT
Start: 2024-08-06 | End: 2024-08-09 | Stop reason: HOSPADM

## 2024-08-06 RX ORDER — LANOLIN ALCOHOL/MO/W.PET/CERES
100 CREAM (GRAM) TOPICAL DAILY
Status: DISCONTINUED | OUTPATIENT
Start: 2024-08-06 | End: 2024-08-09 | Stop reason: HOSPADM

## 2024-08-06 RX ORDER — LISINOPRIL 5 MG/1
5 TABLET ORAL DAILY
Status: DISCONTINUED | OUTPATIENT
Start: 2024-08-06 | End: 2024-08-09 | Stop reason: HOSPADM

## 2024-08-06 RX ORDER — HYDROXYZINE HYDROCHLORIDE 50 MG/ML
50 INJECTION, SOLUTION INTRAMUSCULAR ONCE
Status: COMPLETED | OUTPATIENT
Start: 2024-08-06 | End: 2024-08-06

## 2024-08-06 RX ORDER — ATORVASTATIN CALCIUM 40 MG/1
40 TABLET, FILM COATED ORAL NIGHTLY
Status: DISCONTINUED | OUTPATIENT
Start: 2024-08-06 | End: 2024-08-09 | Stop reason: HOSPADM

## 2024-08-06 RX ORDER — ISOSORBIDE MONONITRATE 60 MG/1
120 TABLET, EXTENDED RELEASE ORAL DAILY
Status: DISCONTINUED | OUTPATIENT
Start: 2024-08-06 | End: 2024-08-09 | Stop reason: HOSPADM

## 2024-08-06 RX ORDER — INSULIN LISPRO 100 [IU]/ML
0-4 INJECTION, SOLUTION INTRAVENOUS; SUBCUTANEOUS NIGHTLY
Status: DISCONTINUED | OUTPATIENT
Start: 2024-08-06 | End: 2024-08-09 | Stop reason: HOSPADM

## 2024-08-06 RX ORDER — FOLIC ACID 1 MG/1
1 TABLET ORAL DAILY
Status: DISCONTINUED | OUTPATIENT
Start: 2024-08-06 | End: 2024-08-09 | Stop reason: HOSPADM

## 2024-08-06 RX ORDER — AMIODARONE HYDROCHLORIDE 200 MG/1
200 TABLET ORAL DAILY
Status: DISCONTINUED | OUTPATIENT
Start: 2024-08-06 | End: 2024-08-09 | Stop reason: HOSPADM

## 2024-08-06 RX ORDER — ONDANSETRON 4 MG/1
4 TABLET, ORALLY DISINTEGRATING ORAL EVERY 8 HOURS PRN
Status: DISCONTINUED | OUTPATIENT
Start: 2024-08-06 | End: 2024-08-09 | Stop reason: HOSPADM

## 2024-08-06 RX ORDER — INSULIN LISPRO 100 [IU]/ML
0-8 INJECTION, SOLUTION INTRAVENOUS; SUBCUTANEOUS
Status: DISCONTINUED | OUTPATIENT
Start: 2024-08-06 | End: 2024-08-09 | Stop reason: HOSPADM

## 2024-08-06 RX ORDER — ALPRAZOLAM 0.5 MG/1
0.5 TABLET ORAL NIGHTLY
Status: DISCONTINUED | OUTPATIENT
Start: 2024-08-06 | End: 2024-08-09 | Stop reason: HOSPADM

## 2024-08-06 RX ORDER — SODIUM CHLORIDE 0.9 % (FLUSH) 0.9 %
5-40 SYRINGE (ML) INJECTION EVERY 12 HOURS SCHEDULED
Status: DISCONTINUED | OUTPATIENT
Start: 2024-08-06 | End: 2024-08-09 | Stop reason: HOSPADM

## 2024-08-06 RX ORDER — ALPRAZOLAM 0.5 MG/1
1 TABLET ORAL NIGHTLY PRN
Status: DISCONTINUED | OUTPATIENT
Start: 2024-08-06 | End: 2024-08-07

## 2024-08-06 RX ORDER — GLUCAGON 1 MG/ML
1 KIT INJECTION PRN
Status: DISCONTINUED | OUTPATIENT
Start: 2024-08-06 | End: 2024-08-09 | Stop reason: HOSPADM

## 2024-08-06 RX ADMIN — AMIODARONE HYDROCHLORIDE 1 MG/MIN: 1.8 INJECTION, SOLUTION INTRAVENOUS at 15:29

## 2024-08-06 RX ADMIN — HYDROXYZINE HYDROCHLORIDE 25 MG: 50 INJECTION, SOLUTION INTRAMUSCULAR at 18:18

## 2024-08-06 RX ADMIN — AMIODARONE HYDROCHLORIDE 150 MG: 1.5 INJECTION, SOLUTION INTRAVENOUS at 15:11

## 2024-08-06 RX ADMIN — ONDANSETRON 4 MG: 2 INJECTION INTRAMUSCULAR; INTRAVENOUS at 14:58

## 2024-08-06 RX ADMIN — AMIODARONE HYDROCHLORIDE 0.5 MG/MIN: 1.8 INJECTION, SOLUTION INTRAVENOUS at 23:47

## 2024-08-06 RX ADMIN — HYDROXYZINE HYDROCHLORIDE 50 MG: 50 INJECTION, SOLUTION INTRAMUSCULAR at 10:16

## 2024-08-06 RX ADMIN — BUMETANIDE 1 MG: 0.25 INJECTION INTRAMUSCULAR; INTRAVENOUS at 18:14

## 2024-08-06 RX ADMIN — ALPRAZOLAM 1 MG: 0.5 TABLET ORAL at 11:41

## 2024-08-06 RX ADMIN — HYDROMORPHONE HYDROCHLORIDE 1 MG: 1 INJECTION, SOLUTION INTRAMUSCULAR; INTRAVENOUS; SUBCUTANEOUS at 08:53

## 2024-08-06 ASSESSMENT — PAIN DESCRIPTION - PAIN TYPE
TYPE: CHRONIC PAIN

## 2024-08-06 ASSESSMENT — PAIN DESCRIPTION - ONSET
ONSET: ON-GOING

## 2024-08-06 ASSESSMENT — PAIN SCALES - GENERAL
PAINLEVEL_OUTOF10: 10
PAINLEVEL_OUTOF10: 7
PAINLEVEL_OUTOF10: 10
PAINLEVEL_OUTOF10: 10
PAINLEVEL_OUTOF10: 9
PAINLEVEL_OUTOF10: 7
PAINLEVEL_OUTOF10: 10

## 2024-08-06 ASSESSMENT — PAIN DESCRIPTION - DESCRIPTORS
DESCRIPTORS: ACHING
DESCRIPTORS: ACHING
DESCRIPTORS: ACHING;DULL

## 2024-08-06 ASSESSMENT — PAIN DESCRIPTION - LOCATION
LOCATION: BACK

## 2024-08-06 ASSESSMENT — PAIN - FUNCTIONAL ASSESSMENT
PAIN_FUNCTIONAL_ASSESSMENT: 0-10

## 2024-08-06 ASSESSMENT — PAIN DESCRIPTION - ORIENTATION
ORIENTATION: LOWER
ORIENTATION: LOWER;LEFT;RIGHT
ORIENTATION: LOWER;RIGHT;LEFT
ORIENTATION: RIGHT;LEFT;LOWER

## 2024-08-06 ASSESSMENT — PAIN DESCRIPTION - FREQUENCY
FREQUENCY: CONTINUOUS

## 2024-08-06 NOTE — ED NOTES
Report received from Jesica RIVER. Pt sitting in wheelchair with call light in reach. VS reassessed. Respirations even and unlabored. Pt denies any current needs at this time.

## 2024-08-06 NOTE — PROCEDURES
PROCEDURE NOTE  Date: 8/6/2024   Name: Garrett Duncan  YOB: 1965    Procedures EKG completed, given to RN

## 2024-08-06 NOTE — H&P
History & Physical  Internal Medicine Resident         Patient: Garrett Duncan 59 y.o. male      : 1965  Date of Admission: 2024  Date of Service: Pt seen/examined on 24 and Admitted to Inpatient with expected LOS greater than two midnights due to medical therapy.       ASSESSMENT AND PLAN  Acute on chronic lower back pain  L1-5 degenerative disc disease with stenosis and claudication  - Suspect secondary to patient's known lumbar pathology with previous intervention; redemonstrated on 2024 CT lumbar spine without IV contrast  -Orthopedic surgery consulted from the emergency department for advance care recommendations  - Patient was educated extensively at the bedside on the risks associated with escalated demand of analgesia with regards to his compromised respiratory function.  He verbalized understanding and is agreeable to judicious escalation of his analgesia pending clinical course  - Patient notably received 1 mg hydromorphone as well as 50 mg of hydroxyzine and 1 mg of alprazolam in the emergency department.  - PDMP reviewed  - PT evaluation prior to discharge    Acute metabolic encephalopathy  - Likely multifactorial; compromised respiratory function, analgesia, nephrologic dysfunction, polysubstance use disorder  - Patient with evidence of frequent desaturations while falling asleep during interview on 2024; patient started on high flow nasal cannula to allow for positive air pressure to be administered as well as titration to maintain SpO2 90 to 96%  - 2024 UDS negative; patient admits to alcohol as well as tobacco usage; patient expresses interest in discontinuation of usage of alcohol; consideration for addiction services consultation prior to discharge  - The patient's mentation could be additionally exacerbated by poor sleep quality, pain profile, anxiety profile which the patient admits to struggling with at the bedside  - Patient made n.p.o. at this time  -  0 days     Minutes of Exercise per Session: 0 min   Stress: Stress Concern Present (7/11/2024)    [employer] - Occupational Stress Questionnaire     Feeling of Stress : Rather much   Social Connections: Unknown (7/11/2024)    Social Connection and Isolation Panel [NHANES]     Frequency of Communication with Friends and Family: More than three times a week     Frequency of Social Gatherings with Friends and Family: More than three times a week     Active Member of Clubs or Organizations: No     Attends Club or Organization Meetings: Never     Marital Status:    Housing Stability: Low Risk  (8/6/2024)    Housing Stability Vital Sign     Unable to Pay for Housing in the Last Year: No     Number of Places Lived in the Last Year: 1     Unstable Housing in the Last Year: No        Code status: Full Code     Electronically signed by Garrett Naik MD on 8/7/2024 at 12:18 AM.   Case was discussed with the Attending, Dr. Parker.      This document was prepared at least partially through the use of voice recognition software. Although effort is taken to assure the accuracy of this document, it is possible that grammatical, syntax,  or spelling errors may occur.

## 2024-08-06 NOTE — ED NOTES
Pt presents to ED with c/o lower back pain. Pt states he was due for spinal fusion but surgery was cancelled due to low hgb in July. Pt states no reschedule date, states awaiting call back from [facility].

## 2024-08-06 NOTE — ED NOTES
Pt transferred to [facility]. Pt in stable condition. Floor staff notified.

## 2024-08-06 NOTE — PROGRESS NOTES
Patient noted to be asleep, spo2 reading 65% with good pleath patient woken up by nurse, reading now 95%  MD Naik Notified, H[facility]NC ordered, RT notified

## 2024-08-06 NOTE — ED PROVIDER NOTES
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    Lumbar spine tumor D49.2    Acute neutrophilia D72.828    Status post lumbar surgery  few days ago Z98.890    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    NSTEMI (non-ST elevated myocardial infarction) ([facility]) I21.4    Hx of CABG Z95.1    ICD (implantable cardioverter-defibrillator) in place Z95.810    Essential hypertension I10    Mixed hyperlipidemia E78.2    Urinary tract infection without hematuria N39.0    Diabetic nephropathy with proteinuria (HCC) E11.21    Acute kidney injury superimposed on CKD ([facility]) N17.9, N18.9    Hemoptysis R04.2    Chronic pulmonary edema J81.1    Acute on chronic combined systolic and diastolic congestive heart failure (HCC) I50.43    Acute on chronic combined systolic and diastolic HF (heart failure) ([facility]) I50.43    Long term (current) use of anticoagulants Z79.01    History of ventricular tachycardia Z86.79    ST elevation myocardial infarction involving left anterior descending (LAD) coronary artery (HCC) I21.02    Status post angioplasty with stent Z95.820    Atrial fibrillation with rapid ventricular response (HCC) I48.91    Dilated cardiomyopathy (HCC) I42.0    Permanent atrial fibrillation (HCC) I48.21    Congestive heart failure (HCC) I50.9    Stage 3b chronic kidney disease (CKD) (HCC) N18.32    Gastroesophageal reflux disease K21.9    Right-sided chest wall pain R07.89    Atrial fibrillation, persistent (HCC) I48.19    Alcohol dependence with withdrawal delirium ([facility]) F10.231    Severe obesity (BMI 35.0-39.9) with comorbidity ([facility])  reviewed with the patient.      The results of pertinent diagnostic studies and exam findings were discussed. The patient’s provisional diagnosis and plan of care were discussed with the patient and present family who expressed understanding. Any medications were reviewed and indications and risks of medications were discussed with the patient /family present. Strict verbal and written return precautions, instructions and appropriate follow-up provided to  the patient  .     ED Medications administered this visit:  (None if blank)  Medications   ALPRAZolam (XANAX) tablet 1 mg (1 mg Oral Given 8/6/24 1141)   HYDROmorphone (DILAUDID) injection 1 mg (1 mg IntraMUSCular Given 8/6/24 0853)   hydrOXYzine (VISTARIL) injection 50 mg (50 mg IntraMUSCular Given 8/6/24 1016)         PROCEDURES: (None if blank)  Procedures:     CRITICAL CARE: (None if blank)      DISCHARGE PRESCRIPTIONS: (None if blank)  New Prescriptions    No medications on file       FINAL IMPRESSION      1. Intractable back pain    2. Uncontrolled hypertension    3. Difficulty in walking    4. Hypomagnesemia    5. Hyperglycemia    6. Cigarette smoker          DISPOSITION/PLAN   DISPOSITION Admitted 08/06/2024 11:46:56 AM      OUTPATIENT FOLLOW UP THE PATIENT:  No follow-up provider specified.    HUGH Sanchez Robert A, PA  08/06/24 2478

## 2024-08-06 NOTE — ED NOTES
ED to inpatient nurses report      Chief Complaint:  Chief Complaint   Patient presents with    Back Pain     Present to ED from: HOME    MOA:     LOC: alert and orientated to name, place, date  Mobility: Requires assistance * 1  Oxygen Baseline: ROOM AIR     Current needs required: NONE     Code Status:   Prior    Mental Status:  Level of Consciousness: Alert (0)    Psych Assessment:        Vitals:  Patient Vitals for the past 24 hrs:   BP Temp Temp src Pulse Resp SpO2 Height Weight   08/06/24 1141 (!) 184/85 -- -- 71 16 98 % -- --   08/06/24 1017 (!) 188/100 -- -- 84 16 96 % -- --   08/06/24 0740 (!) 151/78 98.6 °F (37 °C) Oral 80 18 97 % 1.854 m (6' 1\") 127 kg (280 lb)        LDAs:   Peripheral IV 08/06/24 Right Antecubital (Active)   Site Assessment Clean, dry & intact 08/06/24 1142   Phlebitis Assessment No symptoms 08/06/24 1142   Infiltration Assessment 0 08/06/24 1142       Ambulatory Status:  No data recorded    Diagnosis:  DISPOSITION Admitted 08/06/2024 11:46:56 AM   Final diagnoses:   Intractable back pain   Uncontrolled hypertension   Difficulty in walking   Hypomagnesemia        Consults:  IP CONSULT TO ORTHOPEDIC SURGERY     Pain Score:  Pain Assessment  Pain Assessment: 0-10  Pain Level: 7  Pain Location: Back  Pain Orientation: Lower  Pain Type: Chronic pain    C-SSRS:        Sepsis Screening:       [place] Risk:       Swallow Screening        Preferred Language:   English      ALLERGIES     Latex, Ativan [lorazepam], Gabapentin, Lidocaine, Pcn [penicillins], and Zoloft [sertraline hcl]    SURGICAL HISTORY       Past Surgical History:   Procedure Laterality Date    CARDIAC CATHETERIZATION  03/20/2014    [facility]    CARDIAC DEFIBRILLATOR PLACEMENT  10/13/2015    MEDTRONIC [place], MRI CONDITIONAL ICD    CARDIAC ELECTROPHYSIOLOGY STUDY AND ABLATION      CORONARY ARTERY BYPASS GRAFT  05/09/2014    3 bypass    EKG 12-LEAD  07/17/2015         OTHER SURGICAL HISTORY Left 10/21/2014    Left Bursectomy, I & D  Left Elbow - Dr. 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     Intradural extramedullary spinal tumor     Long term (current) use of anticoagulants 08/29/2019    Medtronic dual icd      Neuromuscular disorder ([facility])     Paroxysmal atrial fibrillation ([facility]) 07/22/2014    Prolonged emergence from general anesthesia     from CABG surgery    Severe obesity (BMI 35.0-39.9) with comorbidity (HCC) 05/22/2023    V-tach ([facility])     s/p ablation in dec 2014           Electronically signed by Jesica Stephens RN on 8/6/2024 at 11:47 AM

## 2024-08-06 NOTE — CONSULTS
[facility]          [address]        [address]        [place], [place] [id number] [phone]           Inpatient Initial consult note         8/6/2024 3:45 PM      Patient Name:   Garrett Duncan  YOB: 1965  Primary Care Physician:  Leonel Adorno APRN - CNP   Admit Date:    8/6/2024  7:35 AM    Consultation requested by : Luzma Parker MD    Reason for Consult : Nephrology following for BABAK/CKD and fluid overload.    History of presenting illness :Garrett Duncan is a 59 y.o.   male with Past Medical History as stated below presented with a chief complaint of Back Pain   on 8/6/2024 .    Patient is drowsy poor historian not able to participate in any communication son at bedside    As per the EMR patient presented with multiple medical issues he has been having difficulty ambulating was having low back pain no fever or chills he was apparently supposed to have surgery done as well for his back he was anemic so it was not done the patient has received some blood transfusion at that time.    However as per the patient he says he came in with alcohol detox.  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  1500 mL fluid restriction and also strict intake and output documentation  Electrolytes -within normal limits  Acid-base status patient's bicarbonate is low, pH is reasonable 7.34 mostly metabolic acidosis due to CKD  Possible A-fib amnio drip  Essential hypertension  Diabetes mellitus  History of alcohol abuse  Meds reviewed and discussed with patient nursing staff and also the son    Samuel MAHONEY MD [place]  [facility]    This report has been created using voice recognition software. It may contain minor errors which are inherent in voice recognition technology

## 2024-08-06 NOTE — ED NOTES
Pt to room 9 from triage. Reports back pain. Reports he was supposed to have spinal fusion but was denies d/t low hemoglobin. Pt rates pain 7/10 at this time. Vomiting during transfer but now states he does not need anything for nausea. Urine specimen obtained and sent to the lab.

## 2024-08-07 ENCOUNTER — APPOINTMENT (OUTPATIENT)
Dept: GENERAL RADIOLOGY | Age: 59
End: 2024-08-07
Payer: COMMERCIAL

## 2024-08-07 LAB
ALBUMIN SERPL BCG-MCNC: 3.5 G/DL (ref 3.5–5.1)
ALP SERPL-CCNC: 81 U/L (ref 38–126)
ALT SERPL W/O P-5'-P-CCNC: 16 U/L (ref 11–66)
ANION GAP SERPL CALC-SCNC: 14 MEQ/L (ref 8–16)
AST SERPL-CCNC: 29 U/L (ref 5–40)
BILIRUB CONJ SERPL-MCNC: 0.2 MG/DL (ref 0.1–13.8)
BILIRUB SERPL-MCNC: 0.4 MG/DL (ref 0.3–1.2)
BUN SERPL-MCNC: 63 MG/DL (ref 7–22)
CALCIUM SERPL-MCNC: 8.2 MG/DL (ref 8.5–10.5)
CHLORIDE SERPL-SCNC: 102 MEQ/L (ref 98–111)
CO2 SERPL-SCNC: 23 MEQ/L (ref 23–33)
CREAT SERPL-MCNC: 3.5 MG/DL (ref 0.4–1.2)
DEPRECATED RDW RBC AUTO: 69.6 FL (ref 35–45)
EKG ATRIAL RATE: 84 BPM
EKG P AXIS: 51 DEGREES
EKG P-R INTERVAL: 232 MS
EKG Q-T INTERVAL: 406 MS
EKG QRS DURATION: 88 MS
EKG QTC CALCULATION (BAZETT): 479 MS
EKG R AXIS: -16 DEGREES
EKG T AXIS: 68 DEGREES
EKG VENTRICULAR RATE: 84 BPM
ERYTHROCYTE [DISTWIDTH] IN BLOOD BY AUTOMATED COUNT: 21.6 % (ref 11.5–14.5)
GFR SERPL CREATININE-BSD FRML MDRD: 19 ML/MIN/1.73M2
GLUCOSE BLD STRIP.AUTO-MCNC: 136 MG/DL (ref 70–108)
GLUCOSE BLD STRIP.AUTO-MCNC: 138 MG/DL (ref 70–108)
GLUCOSE BLD STRIP.AUTO-MCNC: 189 MG/DL (ref 70–108)
GLUCOSE BLD STRIP.AUTO-MCNC: 217 MG/DL (ref 70–108)
GLUCOSE BLD STRIP.AUTO-MCNC: 239 MG/DL (ref 70–108)
GLUCOSE SERPL-MCNC: 123 MG/DL (ref 70–108)
HCT VFR BLD AUTO: 26.9 % (ref 42–52)
HGB BLD-MCNC: 8.3 GM/DL (ref 14–18)
INR PPP: 1 (ref 0.85–1.13)
MAGNESIUM SERPL-MCNC: 2.8 MG/DL (ref 1.6–2.4)
MCH RBC QN AUTO: 28.4 PG (ref 26–33)
MCHC RBC AUTO-ENTMCNC: 30.9 GM/DL (ref 32.2–35.5)
MCV RBC AUTO: 92.1 FL (ref 80–94)
PLATELET # BLD AUTO: 128 THOU/MM3 (ref 130–400)
PMV BLD AUTO: 9.1 FL (ref 9.4–12.4)
POTASSIUM SERPL-SCNC: 4.3 MEQ/L (ref 3.5–5.2)
PROT SERPL-MCNC: 6.2 G/DL (ref 6.1–8)
RBC # BLD AUTO: 2.92 MILL/MM3 (ref 4.7–6.1)
SODIUM SERPL-SCNC: 139 MEQ/L (ref 135–145)
T4 FREE SERPL-MCNC: 1.23 NG/DL (ref 0.93–1.68)
TSH SERPL DL<=0.005 MIU/L-ACNC: 6.37 UIU/ML (ref 0.4–4.2)
WBC # BLD AUTO: 7.1 THOU/MM3 (ref 4.8–10.8)

## 2024-08-07 PROCEDURE — 85027 COMPLETE CBC AUTOMATED: CPT

## 2024-08-07 PROCEDURE — 80053 COMPREHEN METABOLIC PANEL: CPT

## 2024-08-07 PROCEDURE — 36415 COLL VENOUS BLD VENIPUNCTURE: CPT

## 2024-08-07 PROCEDURE — 6360000002 HC RX W HCPCS: Performed by: INTERNAL MEDICINE

## 2024-08-07 PROCEDURE — 2700000000 HC OXYGEN THERAPY PER DAY

## 2024-08-07 PROCEDURE — 2580000003 HC RX 258

## 2024-08-07 PROCEDURE — 84439 ASSAY OF FREE THYROXINE: CPT

## 2024-08-07 PROCEDURE — 92610 EVALUATE SWALLOWING FUNCTION: CPT

## 2024-08-07 PROCEDURE — 85610 PROTHROMBIN TIME: CPT

## 2024-08-07 PROCEDURE — 99223 1ST HOSP IP/OBS HIGH 75: CPT | Performed by: INTERNAL MEDICINE

## 2024-08-07 PROCEDURE — 6360000002 HC RX W HCPCS

## 2024-08-07 PROCEDURE — 94761 N-INVAS EAR/PLS OXIMETRY MLT: CPT

## 2024-08-07 PROCEDURE — 99233 SBSQ HOSP IP/OBS HIGH 50: CPT | Performed by: INTERNAL MEDICINE

## 2024-08-07 PROCEDURE — 6370000000 HC RX 637 (ALT 250 FOR IP)

## 2024-08-07 PROCEDURE — 93010 ELECTROCARDIOGRAM REPORT: CPT | Performed by: INTERNAL MEDICINE

## 2024-08-07 PROCEDURE — 99232 SBSQ HOSP IP/OBS MODERATE 35: CPT | Performed by: INTERNAL MEDICINE

## 2024-08-07 PROCEDURE — 82948 REAGENT STRIP/BLOOD GLUCOSE: CPT

## 2024-08-07 PROCEDURE — 2060000000 HC ICU INTERMEDIATE R&B

## 2024-08-07 PROCEDURE — 84443 ASSAY THYROID STIM HORMONE: CPT

## 2024-08-07 PROCEDURE — 82248 BILIRUBIN DIRECT: CPT

## 2024-08-07 PROCEDURE — 83735 ASSAY OF MAGNESIUM: CPT

## 2024-08-07 PROCEDURE — 71045 X-RAY EXAM CHEST 1 VIEW: CPT

## 2024-08-07 RX ORDER — LORAZEPAM 2 MG/ML
4 INJECTION INTRAMUSCULAR
Status: DISCONTINUED | OUTPATIENT
Start: 2024-08-07 | End: 2024-08-07

## 2024-08-07 RX ORDER — ISOSORBIDE MONONITRATE 30 MG/1
30 TABLET, EXTENDED RELEASE ORAL DAILY
Status: DISCONTINUED | OUTPATIENT
Start: 2024-08-07 | End: 2024-08-09 | Stop reason: HOSPADM

## 2024-08-07 RX ORDER — LORAZEPAM 2 MG/ML
3 INJECTION INTRAMUSCULAR
Status: DISCONTINUED | OUTPATIENT
Start: 2024-08-07 | End: 2024-08-07

## 2024-08-07 RX ORDER — ALPRAZOLAM 0.5 MG/1
0.5 TABLET ORAL NIGHTLY PRN
Status: DISCONTINUED | OUTPATIENT
Start: 2024-08-07 | End: 2024-08-09 | Stop reason: HOSPADM

## 2024-08-07 RX ORDER — OXYCODONE HYDROCHLORIDE AND ACETAMINOPHEN 5; 325 MG/1; MG/1
2 TABLET ORAL EVERY 4 HOURS PRN
Status: DISCONTINUED | OUTPATIENT
Start: 2024-08-07 | End: 2024-08-09 | Stop reason: HOSPADM

## 2024-08-07 RX ORDER — LORAZEPAM 1 MG/1
2 TABLET ORAL
Status: DISCONTINUED | OUTPATIENT
Start: 2024-08-07 | End: 2024-08-07

## 2024-08-07 RX ORDER — HYDROXYZINE PAMOATE 25 MG/1
25 CAPSULE ORAL 3 TIMES DAILY PRN
Status: DISCONTINUED | OUTPATIENT
Start: 2024-08-07 | End: 2024-08-09 | Stop reason: HOSPADM

## 2024-08-07 RX ORDER — LORAZEPAM 2 MG/ML
2 INJECTION INTRAMUSCULAR
Status: DISCONTINUED | OUTPATIENT
Start: 2024-08-07 | End: 2024-08-07

## 2024-08-07 RX ORDER — LORAZEPAM 1 MG/1
4 TABLET ORAL
Status: DISCONTINUED | OUTPATIENT
Start: 2024-08-07 | End: 2024-08-07

## 2024-08-07 RX ORDER — LORAZEPAM 1 MG/1
3 TABLET ORAL
Status: DISCONTINUED | OUTPATIENT
Start: 2024-08-07 | End: 2024-08-07

## 2024-08-07 RX ORDER — MULTIVITAMIN WITH IRON
1 TABLET ORAL DAILY
Status: DISCONTINUED | OUTPATIENT
Start: 2024-08-07 | End: 2024-08-09 | Stop reason: HOSPADM

## 2024-08-07 RX ORDER — HYDROXYZINE HYDROCHLORIDE 25 MG/1
25 TABLET, FILM COATED ORAL EVERY 6 HOURS PRN
Status: DISCONTINUED | OUTPATIENT
Start: 2024-08-07 | End: 2024-08-07

## 2024-08-07 RX ORDER — LORAZEPAM 2 MG/ML
1 INJECTION INTRAMUSCULAR
Status: DISCONTINUED | OUTPATIENT
Start: 2024-08-07 | End: 2024-08-07

## 2024-08-07 RX ORDER — OXYCODONE HYDROCHLORIDE AND ACETAMINOPHEN 5; 325 MG/1; MG/1
1 TABLET ORAL EVERY 4 HOURS PRN
Status: DISCONTINUED | OUTPATIENT
Start: 2024-08-07 | End: 2024-08-09 | Stop reason: HOSPADM

## 2024-08-07 RX ORDER — METOPROLOL SUCCINATE 25 MG/1
25 TABLET, EXTENDED RELEASE ORAL DAILY
Status: DISCONTINUED | OUTPATIENT
Start: 2024-08-07 | End: 2024-08-09 | Stop reason: HOSPADM

## 2024-08-07 RX ORDER — LANOLIN ALCOHOL/MO/W.PET/CERES
100 CREAM (GRAM) TOPICAL DAILY
Status: DISCONTINUED | OUTPATIENT
Start: 2024-08-07 | End: 2024-08-09 | Stop reason: HOSPADM

## 2024-08-07 RX ORDER — INSULIN GLARGINE 100 [IU]/ML
5 INJECTION, SOLUTION SUBCUTANEOUS NIGHTLY PRN
Status: DISCONTINUED | OUTPATIENT
Start: 2024-08-07 | End: 2024-08-09 | Stop reason: HOSPADM

## 2024-08-07 RX ORDER — LORAZEPAM 1 MG/1
1 TABLET ORAL
Status: DISCONTINUED | OUTPATIENT
Start: 2024-08-07 | End: 2024-08-07

## 2024-08-07 RX ADMIN — ATORVASTATIN CALCIUM 40 MG: 40 TABLET, FILM COATED ORAL at 20:46

## 2024-08-07 RX ADMIN — METOPROLOL SUCCINATE 25 MG: 25 TABLET, FILM COATED, EXTENDED RELEASE ORAL at 11:24

## 2024-08-07 RX ADMIN — SODIUM CHLORIDE, PRESERVATIVE FREE 10 ML: 5 INJECTION INTRAVENOUS at 20:46

## 2024-08-07 RX ADMIN — ISOSORBIDE MONONITRATE 30 MG: 30 TABLET, EXTENDED RELEASE ORAL at 11:25

## 2024-08-07 RX ADMIN — OXYCODONE HYDROCHLORIDE AND ACETAMINOPHEN 2 TABLET: 5; 325 TABLET ORAL at 16:45

## 2024-08-07 RX ADMIN — BUMETANIDE 1 MG: 0.25 INJECTION INTRAMUSCULAR; INTRAVENOUS at 17:50

## 2024-08-07 RX ADMIN — Medication 1 TABLET: at 09:22

## 2024-08-07 RX ADMIN — DICYCLOMINE HYDROCHLORIDE 10 MG: 10 CAPSULE ORAL at 17:50

## 2024-08-07 RX ADMIN — SODIUM CHLORIDE, PRESERVATIVE FREE 10 ML: 5 INJECTION INTRAVENOUS at 09:26

## 2024-08-07 RX ADMIN — OXYCODONE HYDROCHLORIDE AND ACETAMINOPHEN 2 TABLET: 5; 325 TABLET ORAL at 11:18

## 2024-08-07 RX ADMIN — HYDROXYZINE PAMOATE 25 MG: 25 CAPSULE ORAL at 20:46

## 2024-08-07 RX ADMIN — FOLIC ACID 1 MG: 1 TABLET ORAL at 09:22

## 2024-08-07 RX ADMIN — INSULIN LISPRO 2 UNITS: 100 INJECTION, SOLUTION INTRAVENOUS; SUBCUTANEOUS at 14:10

## 2024-08-07 RX ADMIN — Medication 100 MG: at 09:22

## 2024-08-07 RX ADMIN — AMIODARONE HYDROCHLORIDE 200 MG: 200 TABLET ORAL at 12:27

## 2024-08-07 RX ADMIN — OXYCODONE HYDROCHLORIDE AND ACETAMINOPHEN 2 TABLET: 5; 325 TABLET ORAL at 20:45

## 2024-08-07 RX ADMIN — DICYCLOMINE HYDROCHLORIDE 10 MG: 10 CAPSULE ORAL at 11:18

## 2024-08-07 RX ADMIN — HYDROXYZINE HYDROCHLORIDE 25 MG: 50 INJECTION, SOLUTION INTRAMUSCULAR at 05:50

## 2024-08-07 RX ADMIN — BUMETANIDE 1 MG: 0.25 INJECTION INTRAMUSCULAR; INTRAVENOUS at 09:19

## 2024-08-07 ASSESSMENT — PAIN DESCRIPTION - ORIENTATION
ORIENTATION: MID
ORIENTATION: MID
ORIENTATION: RIGHT;LEFT;LOWER
ORIENTATION: MID
ORIENTATION: MID

## 2024-08-07 ASSESSMENT — PATIENT HEALTH QUESTIONNAIRE - PHQ9
SUM OF ALL RESPONSES TO PHQ QUESTIONS 1-9: 2
2. FEELING DOWN, DEPRESSED OR HOPELESS: SEVERAL DAYS
SUM OF ALL RESPONSES TO PHQ9 QUESTIONS 1 & 2: 2
SUM OF ALL RESPONSES TO PHQ QUESTIONS 1-9: 2
1. LITTLE INTEREST OR PLEASURE IN DOING THINGS: SEVERAL DAYS

## 2024-08-07 ASSESSMENT — PAIN DESCRIPTION - DESCRIPTORS
DESCRIPTORS: ACHING;SHARP
DESCRIPTORS: ACHING
DESCRIPTORS: ACHING;SHARP

## 2024-08-07 ASSESSMENT — PAIN DESCRIPTION - ONSET
ONSET: ON-GOING

## 2024-08-07 ASSESSMENT — PAIN - FUNCTIONAL ASSESSMENT
PAIN_FUNCTIONAL_ASSESSMENT: ACTIVITIES ARE NOT PREVENTED
PAIN_FUNCTIONAL_ASSESSMENT: ACTIVITIES ARE NOT PREVENTED
PAIN_FUNCTIONAL_ASSESSMENT: PREVENTS OR INTERFERES WITH MANY ACTIVE NOT PASSIVE ACTIVITIES
PAIN_FUNCTIONAL_ASSESSMENT: PREVENTS OR INTERFERES WITH MANY ACTIVE NOT PASSIVE ACTIVITIES
PAIN_FUNCTIONAL_ASSESSMENT: ACTIVITIES ARE NOT PREVENTED

## 2024-08-07 ASSESSMENT — PAIN DESCRIPTION - LOCATION
LOCATION: BACK

## 2024-08-07 ASSESSMENT — PAIN DESCRIPTION - DIRECTION
RADIATING_TOWARDS: DOWN LEGS
RADIATING_TOWARDS: DOWN LEGS

## 2024-08-07 ASSESSMENT — PAIN DESCRIPTION - FREQUENCY
FREQUENCY: CONTINUOUS

## 2024-08-07 ASSESSMENT — PAIN SCALES - GENERAL
PAINLEVEL_OUTOF10: 8
PAINLEVEL_OUTOF10: 9
PAINLEVEL_OUTOF10: 10
PAINLEVEL_OUTOF10: 10
PAINLEVEL_OUTOF10: 8

## 2024-08-07 ASSESSMENT — PAIN DESCRIPTION - PAIN TYPE
TYPE: CHRONIC PAIN

## 2024-08-07 ASSESSMENT — LIFESTYLE VARIABLES
HOW OFTEN DO YOU HAVE A DRINK CONTAINING ALCOHOL: MONTHLY OR LESS
HOW MANY STANDARD DRINKS CONTAINING ALCOHOL DO YOU HAVE ON A TYPICAL DAY: 5 OR 6

## 2024-08-07 NOTE — CONSULTS
[facility]'s  Consult    Patient's Name/Date of Birth: Garrett Duncan / 1965 (59 y.o.)    Date: August 7, 2024     Referring Provider: Luzma Parker MD    CHIEF COMPLAINT: Lower back pain      HPI: This is a pleasant 59 y.o. male w/ PMHx HFrEF s/p AICD (2015), alcohol abuse, CAD s/p CABG x3 (2014), CKD4, T2DM, GERD, HTN, pAfib who presents with persistent lower back pain. Has known lower back issues of L1-5 for which he was planning to have an unspecified spinal surgery which was postponed 2/2 hospitalization for low Hgb. Per chart review, pt has had multiple admissions this year for various issues, including CHF exacerbation. Cardiology is consulted for CHF exacerbation. Patient at presents only reports some back pain and denies worsening shortness of breath from baseline.  Denies orthopnea.      Echo 7/23/24: severely reduced LV systolic function, EF 25%. LV moderately dilated. Severe global hypokinesis present. LA severely dilated.     MYRIAM-CV 4/20/22: for persistent Afib following COVID infection; successful CV w/ 200J     LHC 10/20/20: mid LAD 50% stenosis, 90% diagonal stenosis seen in prox aspect of diagonal.  RCA; diffusely diseased w/ 70-80% stenosis proximally and 90% stenosis in mid segment. Successful PCI LAD x1, successful PCI of diagonal branch.       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      Intradural extramedullary spinal tumor     Long term (current) use of anticoagulants 08/29/2019    Medtronic dual icd      Neuromuscular disorder (HCC)     Paroxysmal atrial fibrillation (HCC) 07/22/2014    Prolonged emergence from general anesthesia     from CABG surgery    Severe obesity (BMI 35.0-39.9) with comorbidity (HCC) 05/22/2023    V-tach (HCC)     s/p ablation in dec 2014     Past Surgical History:   Procedure Laterality Date    CARDIAC CATHETERIZATION  03/20/2014    [facility]    CARDIAC DEFIBRILLATOR PLACEMENT  10/13/2015    [employer] [place], MRI CONDITIONAL ICD    CARDIAC ELECTROPHYSIOLOGY STUDY AND ABLATION      CORONARY ARTERY BYPASS GRAFT  05/09/2014    3 bypass    EKG 12-LEAD  07/17/2015         OTHER SURGICAL HISTORY Left 10/21/2014    Left Bursectomy, I & D Left Elbow - Dr. Garduno    PACEMAKER PLACEMENT      PACER INTERROGATE  3/8/2024    [place] LAMINECTOMY W/O FFD > 2 VERT SEG LUMBAR N/A 01/16/2018    LUMBAR AND SACRAL LAMINECTOMY, REMOVAL OF INTRASPINAL TUMORS performed by Burke Garcia MD at [facility] OR    VASCULAR SURGERY      cabg harvest from legs     Current Facility-Administered Medications   Medication Dose Route Frequency Provider Last Rate Last Admin    [Held by provider] ALPRAZolam (XANAX) tablet 0.5 mg  0.5 mg Oral Nightly PRN Garrett Naik MD        thiamine tablet 100 mg  100 mg Oral Daily Newman-Waterhouse, Aundrea N, DO        LORazepam (ATIVAN) tablet 1 mg  1 mg Oral Q1H PRN Newman-Waterhouse, Aundrea N, DO        Or    LORazepam (ATIVAN) injection 1 mg  1 mg IntraVENous Q1H PRN Newman-Waterhouse, Aundrea N, DO        Or    LORazepam (ATIVAN) tablet 2 mg  2 mg Oral Q1H PRN Newman-Waterhouse, Aundrea N, DO        Or    LORazepam (ATIVAN) injection 2 mg  2 mg IntraVENous Q1H PRN Newman-Waterhouse, Aundrea N, DO        Or    LORazepam (ATIVAN) tablet 3 mg  3 mg Oral Q1H PRN Newman-Waterhouse, Aundrea N, DO        Or    LORazepam (ATIVAN) injection 3 mg  3 mg IntraVENous Q1H PRN

## 2024-08-07 NOTE — PROGRESS NOTES
[facility]  SPEECH THERAPY  STRZ ICU STEPDOWN TELEMETRY 4K  Clinical Swallow Evaluation    Discharge Recommendations: Continue to Assess Pending Progress  DIET ORDER RECOMMENDATIONS AFTER EVALUATION: Regular diet with thin liquids     SLP Individual Minutes  Time In: 905  Time Out: 15  Minutes: 10  Timed Code Treatment Minutes: 0 Minutes       Date: 2024  Patient Name: Garrett Duncan      CSN: [id number]   : 1965  (59 y.o.)  Gender: male   Referring Physician:      Newman-Waterhouse, Aundrea N, DO       Diagnosis: Lower back pain    History of Present Illness/Injury: Patient admitted to [facility] with above dx. See H&P for full report. ST consulted to complete clinical swallow evaluation d/t concerns for dysphagia. ST to complete CSE and determine safety of PO diet.   Past Medical History:   Diagnosis Date    Acute systolic CHF (congestive heart failure) ([facility]) 2015    Alcohol abuse     Anomalous origin of right coronary artery 2014    CAD (coronary artery disease) 2014    s/p CABG in may 2014    Chronic kidney disease     Diabetes mellitus ([facility])     Drug abuse ([facility])     hx of cacaine abuse, stopped abusing drugs in     GERD (gastroesophageal reflux disease)     History of implantable cardiac defibrillator (ICD)     Hypertension     Intradural extramedullary spinal tumor     Long term (current) use of anticoagulants 2019    Medtronic dual icd      Neuromuscular disorder ([facility])     Paroxysmal atrial fibrillation ([facility]) 2014    Prolonged emergence from general anesthesia     from CABG surgery    Severe obesity (BMI 35.0-39.9) with comorbidity ([facility]) 2023    V-tach ([facility])     s/p ablation in dec 2014       SUBJECTIVE:  ALETA Bush approved ST attempt. Patient cooperative with ST. Son present.     OBJECTIVE:    Pain:  Patient reporting \"back pain.\" Patient seen sitting edge of bed. Patient did not rate pain with ST present; ALETA Buhs P aware of back pain.      Current Diet: NPO    Respiratory Status:  Nasal Canula: 6L    Behavioral Observation:  Alert and Oriented    CRANIAL NERVE ASSESSMENT   CN V (Trigeminal) Closes and Opens Mandible WFL    Rotary Jaw Movement WFL      CN VII (Facial) Cheeks Hold Food out of Sulci WFL    Opens, Closes/Seals, Protrudes, Retracts Lips WFL    General Appearance WFL    Sensation WFL      CN X (Vagus - Pharyngeal) Raises Back of Tongue WFL      CN XI (Accessory) Lifts Soft Palate WFL      CN XII (Hypoglossal) Elevates Tongue Up and Back WFL    Protrusion   WFL    Lateralizes Tongue WFL    Sensation WFL      Other Observations Dentition WNL    Vocal Quality WNL    Cough DNT     Patient Evaluated Using: Thin Liquids, Puree, and Coarse Solids    Oral Phase:  WFL    Pharyngeal Phase: WFL:  Pharyngeal phase appears WFL but cannot rule out pharyngeal phase deficits from a bedside swallowing evaluation alone.    Signs and Symptoms of Laryngeal Penetration/Aspiration: No signs/symptoms of aspiration evident in this evaluation, but cannot rule out silent aspiration.    Aspiration Pneumonia Predictors:  Limited Mobility    Functional Oral Intake Scale: Total Oral Intake: 7.  Total oral intake with no restrictions    Impressions: Patient presents with oral phase of swallow function that is essentially WFL with low suspicion for presence of a pharyngeal dysphagia. No overt s/s aspiration exhibited across all consistencies/trials consumed, certainly not able to exclude pharyngeal phase dysfunction and/or airway invasion events without supportive imaging. Patient's swallow physiology does appear appropriate to support PO intake without distress with instrumental evaluation not warranted.     Recommend continuation of regular diet with thin liquids. Will continue to follow patient POC due to ongoing need for supplemental oxygen as this is not patient's baseline per patient report. ST recommending skilled therapy services x1-3 per week.

## 2024-08-07 NOTE — CARE COORDINATION
08/07/24 0848   Readmission Assessment   Number of Days since last admission? 8-30 days   Previous Disposition Home Alone   Who is being Interviewed Patient   What was the patient's/caregiver's perception as to why they think they needed to return back to the hospital? Other (Comment)  (Back Pain)   Did you visit your Primary Care Physician after you left the hospital, before you returned this time? No   Why weren't you able to visit your PCP? Other (Comment)  (readmitted too soon)   Did you see a specialist, such as Cardiac, Pulmonary, Orthopedic Physician, etc. after you left the hospital? Yes   Who advised the patient to return to the hospital? Self-referral   Does the patient report anything that got in the way of taking their medications? No   In our efforts to provide the best possible care to you and others like you, can you think of anything that we could have done to help you after you left the hospital the first time, so that you might not have needed to return so soon? Other (Comment)  (back pain yet at discharge, -JARED w poor communication, more dario COLEMAN, left message dario COLEMAN, they did not return his call, eager for Ortho Consult today)

## 2024-08-07 NOTE — PROGRESS NOTES
[facility]   PROGRESS NOTE      Patient: Garrett Duncan  Room #: 4K-14/014-A            YOB: 1965  Age: 59 y.o.  Gender: male            Admit Date & Time: 8/6/2024  7:35 AM    Assessment:    The patient declined a visit and would like to be followed up on later.     Interventions:  The patient was provided information about Spiritual Care being available.     Outcomes:  The  wished the patient a positive day.     Plan:  1.Spiritual care will continue to follow the patient according to [facility] spiritual care SOP.       Electronically signed by Chaplain Zayda, on 8/7/2024 at 10:15 AM.  Spiritual Care Department  [facility]  [phone]     08/07/24 1014   Encounter Summary   Encounter Overview/Reason Initial Encounter   Service Provided For Patient;Family;Patient and family together   Referral/Consult From Nurse   Support System Family members   Last Encounter  08/07/24   Complexity of Encounter Low   Begin Time 0740   End Time  0745   Total Time Calculated 5 min   Spiritual/Emotional needs   Type Spiritual Support   Assessment/Intervention/Outcome   Assessment Coping   Intervention Empowerment   Outcome Refused/Declined;Receptive   Plan and Referrals   Plan/Referrals Continue to visit, (comment)

## 2024-08-07 NOTE — PROGRESS NOTES
[facility]   Nephrology progress note  8/7/2024, 12:59 PM      Pt Name:    Garrett Duncan  MRN:     [id number]     YOB: 1965  Admit Date:    8/6/2024  7:35 AM    Chief Complaint: Nephrology following for BABAK/CKD and possibly fluid overload.    Subjective:  Patient seen and examined  No chest pain no significant shortness of breath  Does complain of some back pain  Continues to be on high flow oxygen  Decent urine output    Objective:  24HR INTAKE/OUTPUT:    Intake/Output Summary (Last 24 hours) at 8/7/2024 1259  Last data filed at 8/7/2024 1213  Gross per 24 hour   Intake 254.12 ml   Output 1625 ml   Net -1370.88 ml      Admission weight: 127 kg (280 lb)  Wt Readings from Last 3 Encounters:   08/07/24 118.2 kg (260 lb 9.3 oz)   07/26/24 125.3 kg (276 lb 3.2 oz)   07/18/24 124.7 kg (275 lb)        Vitals :   Vitals:    08/07/24 0552 08/07/24 0841 08/07/24 0845 08/07/24 1129   BP:   (!) 174/78 (!) 161/92   Pulse: 74  70 77   Resp:    18   Temp:   98.6 °F (37 °C) 98.8 °F (37.1 °C)   TempSrc:   Oral Oral   SpO2: 100% 100% 100% 96%   Weight:       Height:           Physical examination  General Appearance:  Well developed. No distress  Mouth/Throat:  Oral mucosa moist  Neck:  Supple, no JVD  Lungs:  Breath sounds: clear  Heart::  S1,S2 heard  Abdomen:  Soft, non - tender  Musculoskeletal:  Edema -noted bilaterally    Medications:  Infusion:    sodium chloride      dextrose       Meds:    thiamine  100 mg Oral Daily    multivitamin  1 tablet Oral Daily    metoprolol succinate  25 mg Oral Daily    isosorbide mononitrate  30 mg Oral Daily    sodium chloride flush  5-40 mL IntraVENous 2 times per day    [Held by provider] ALPRAZolam  0.5 mg Oral Nightly    amiodarone  200 mg Oral Daily    [Held by provider] thiamine  100 mg Oral Daily    folic acid  1 mg Oral Daily    [Held by provider] ticagrelor  90 mg Oral BID    [Held by provider] hydrALAZINE  100 mg Oral TID    [Held by provider]  isosorbide mononitrate  120 mg Oral Daily    [Held by provider] metoprolol succinate  100 mg Oral Daily    [Held by provider] bumetanide  2 mg Oral BID    dicyclomine  10 mg Oral TID WC    [Held by provider] lisinopril  5 mg Oral Daily    atorvastatin  40 mg Oral Nightly    insulin lispro  0-8 Units SubCUTAneous TID WC    insulin lispro  0-4 Units SubCUTAneous Nightly    lidocaine  1 patch TransDERmal Daily    bumetanide  1 mg IntraVENous BID       Lab Data :  CBC:   Recent Labs     08/06/24  0854 08/07/24  0201   WBC 7.4 7.1   HGB 9.0* 8.3*   HCT 29.6* 26.9*    128*     CMP:  Recent Labs     08/06/24  0854 08/07/24  0201    139   K 4.1 4.3    102   CO2 19* 23   BUN 69* 63*   CREATININE 3.8* 3.5*   GLUCOSE 249* 123*   CALCIUM 8.2* 8.2*   MG 2.9* 2.8*     Hepatic:   Recent Labs     08/07/24  0201   AST 29   ALT 16   BILITOT 0.4   ALKPHOS 81       Assessment and Plan:  Renal -CKD stage IV overall renal function appears to be stable close to baseline  Chest x-ray shows some CHF, bilateral lower extremity edema and elevated proBNP.  Decent urine output continue fluid restriction and Bumex 1 mg IV twice daily  Creatinine improving may be having some cardiorenal  Electrolytes -within normal limits  Acid-base status patient's bicarbonate is low, pH is reasonable 7.34 mostly metabolic acidosis due to CKD  Possible A-fib amnio drip  Essential hypertension  Diabetes mellitus  History of alcohol abuse  Meds reviewed and discussed with patient     Samuel Kathleen MD  [facility]    This report has been created using voice recognition software. It may contain minor errors which are inherent in voice recognition technology

## 2024-08-07 NOTE — PROGRESS NOTES
Ortho Spine    Patient on list. Dr. Rae is out until next week.   Surgery recommended in the past is a L1-5 decompression and fusion.   If patient will be medically cleared during admission for surgery, can do next week or have on-call spine surgeon do sooner.   If no surgery planned during admission, recommend PT/OT and pain management.     HUGH Medrano

## 2024-08-07 NOTE — CONSULTS
Brief Intervention and Referral to Treatment Summary     Patient was provided PHQ-9, AUDIT-C and DAST Screening:      PHQ-9 Score:  2  AUDIT-C Score: 4   DAST Score:   0    Patient’s substance use is considered:    Moderate Risk    Patient’s depression is considered:    Minimal    Brief Education Was Provided     Patient was receptive    Brief Intervention Is Provided (Only for AUDIT-C or DAST)     N/A pt states he drinks alcohol monthly or less.     Patient reports readiness to decrease and/or stop use and a plan was discussed   Patient denies readiness to decrease and/or stop use and a plan was not discussed    Recommendations/Referrals for Brief and/or Specialized Treatment Provided to Patient       Pt did accept resources. Is aware of what the community has to offer. Completed a rehab in [place] in the past. Has a friend who is reaches out to as well.

## 2024-08-07 NOTE — PROGRESS NOTES
Hospitalist Progress Note  Internal Medicine Resident      Patient: Garrett Duncan 59 y.o. male      Unit/Bed: -14/014-A    Admit Date: 8/6/2024      ASSESSMENT AND PLAN  Active Problems  Acute on chronic lower back pain 2/2 L1-5 degenerative disc disease with stenosis and claudication: H/o DDD with previous interventions, redemonstrated on 6/20/24 CT lumbar. Patient was scheduled for surgery outpatient, but was postponed secondary to hospitalization on 7/16/24 for anemia. Was given 1x dilaudid, hydroxyzine, and alprazolam in ED. On bentyl and lidocaine patch at home for pain.   Continue Lidocaine patch, bentyl   Tylenol and percocet as needed for pain  Avoid high dose narcotics due to risk of sedation  PT  Orthopedic surgery consulted  Acute hypoxic respiratory failure: Suspect 2/2 DUSTIN. Pt was noted to have witnessed desaturations on RA when falling asleep on 8/6/24. He was placed on high flow nasal cannula. During the day time he was able to be weaned to 2 LNC. Baseline RA.   Supplemental O2 as needed  Outpatient follow up for DUSTIN  Acute exacerbation of systolic HFrEF s/p ICD: Suspect 2/2 noncompliance. Last echo 7/23/24 EF 25%, LV dilation, severe global hypokinesis, severely dilated LA. GDMT: lisinopril, farxiga, toprol, IMDUR, hydralazine. Pt reports not taking GDMT for past week while binge drinking.   Bumex 1 mg IV twice daily   Restarted toprol 25 mg daily and IMDUR 30 mg daily, will gradually increase to home dose as tolerated.   Daily weights and strict I&Os  2g Na and 2 L fluid restriction  Cardiology consulted  Follow up in CHF clinic   Acute on chronic elevation of troponin in setting of Dilated and Ischemic Cardiomyopathy and demand: Pt presented with chest pain on admission. Trop , repeat 250. EKG showed first degree AV block with PACs. H/o ICD, HFrEF, CKD4, CAD. Last [facility] 2022 not released. Last echo 7/23/24 EF 25%, LV dilation, severe global hypokinesis, severely dilated LA.   Will obtain

## 2024-08-07 NOTE — CARE COORDINATION
Case Management Assessment Initial Evaluation    Date/Time of Evaluation: 8/7/2024 9:00 AM  Assessment Completed by: Sandeep Bateman RN    If patient is discharged prior to next notation, then this note serves as note for discharge by case management.    Patient Name: Garrett Duncan                   YOB: 1965  Diagnosis: Hypomagnesemia [E83.42]  Lower back pain [M54.50]  Hyperglycemia [R73.9]  Cigarette smoker [F17.210]  Uncontrolled hypertension [I10]  Intractable back pain [M54.9]  Difficulty in walking [R26.2]                   Date / Time: 8/6/2024  7:35 AM  Location: [address] [place] [address]     Patient Admission Status: Inpatient   Readmission Risk Low 0-14, Mod 15-19), High > 20: Readmission Risk Score: 42.1    Current PCP: Leonel Adorno APRN - [facility]  Health Care Decision Makers:   Primary Decision Maker: Jesica Duncan (Jose) - Child - [phone]    Secondary Decision Maker: Lou Duncan - Child - [phone]    Additional Case Management Notes:   Recurrent Back Pain/BABAK/VT  History: CHF, Cocaine/Active Smoker, EF 25%, AICD, CKD, DM, OHS, Alcohol Use (plans future lumbar spine fusion)  (+) ETOH 0.04  HF Oxygen 50/45L  Amiodarone Gtt  Creatinine 3.5 (baseline), H 8.3; monitor  Ortho following  Patient Goals/Plan/Treatment Preferences: plans home alone; Addiction Services following; daughter/JOSE Mooney           08/07/24 0856   Service Assessment   Patient Orientation Alert and Oriented   Cognition Alert   History Provided By Patient;Medical Record   Primary Caregiver Self   Accompanied By/Relationship none   Support Systems Family Members   Patient's Healthcare Decision Maker is: Named in Scanned ACP Document   PCP Verified by CM Yes   Last Visit to PCP Within last 3 months   Prior Functional Level Independent in ADLs/IADLs   Current Functional Level Independent in ADLs/IADLs   Can patient return to prior living arrangement Yes   Ability to make needs known: Good   Family able to assist with home care

## 2024-08-07 NOTE — PROGRESS NOTES
[facility]  OCCUPATIONAL THERAPY MISSED TREATMENT NOTE  STRZ ICU STEPDOWN TELEMETRY 4K  4K-14/014-A      Date: 2024  Patient Name: Garrett Duncan        CSN: [id number]   : 1965  (59 y.o.)  Gender: male                REASON FOR MISSED TREATMENT: Patient Refused.  Pt reporting 10/10 pain in back and refusing any movement. Pt refuses offer to check back later today stating \"I'm not leaving today so you don't need to see me if I'm not going anywhere\". This writer provided education on the benefits and importance of movement and activity for maintenance of strength and endurance and pt continued to refuse. OT to check back tomorrow.

## 2024-08-08 LAB
ANION GAP SERPL CALC-SCNC: 16 MEQ/L (ref 8–16)
BUN SERPL-MCNC: 69 MG/DL (ref 7–22)
CALCIUM SERPL-MCNC: 8.2 MG/DL (ref 8.5–10.5)
CHLORIDE SERPL-SCNC: 101 MEQ/L (ref 98–111)
CO2 SERPL-SCNC: 19 MEQ/L (ref 23–33)
CREAT SERPL-MCNC: 3.8 MG/DL (ref 0.4–1.2)
DEPRECATED RDW RBC AUTO: 71.9 FL (ref 35–45)
ERYTHROCYTE [DISTWIDTH] IN BLOOD BY AUTOMATED COUNT: 22.2 % (ref 11.5–14.5)
GFR SERPL CREATININE-BSD FRML MDRD: 17 ML/MIN/1.73M2
GLUCOSE BLD STRIP.AUTO-MCNC: 105 MG/DL (ref 70–108)
GLUCOSE BLD STRIP.AUTO-MCNC: 160 MG/DL (ref 70–108)
GLUCOSE BLD STRIP.AUTO-MCNC: 305 MG/DL (ref 70–108)
GLUCOSE BLD STRIP.AUTO-MCNC: 354 MG/DL (ref 70–108)
GLUCOSE SERPL-MCNC: 124 MG/DL (ref 70–108)
HCT VFR BLD AUTO: 30.2 % (ref 42–52)
HGB BLD-MCNC: 8.9 GM/DL (ref 14–18)
MAGNESIUM SERPL-MCNC: 2.8 MG/DL (ref 1.6–2.4)
MCH RBC QN AUTO: 27.8 PG (ref 26–33)
MCHC RBC AUTO-ENTMCNC: 29.5 GM/DL (ref 32.2–35.5)
MCV RBC AUTO: 94.4 FL (ref 80–94)
PLATELET # BLD AUTO: 133 THOU/MM3 (ref 130–400)
PMV BLD AUTO: 9.9 FL (ref 9.4–12.4)
POTASSIUM SERPL-SCNC: 4.3 MEQ/L (ref 3.5–5.2)
RBC # BLD AUTO: 3.2 MILL/MM3 (ref 4.7–6.1)
SODIUM SERPL-SCNC: 136 MEQ/L (ref 135–145)
WBC # BLD AUTO: 5.7 THOU/MM3 (ref 4.8–10.8)

## 2024-08-08 PROCEDURE — 1200000003 HC TELEMETRY R&B

## 2024-08-08 PROCEDURE — 97162 PT EVAL MOD COMPLEX 30 MIN: CPT

## 2024-08-08 PROCEDURE — 6370000000 HC RX 637 (ALT 250 FOR IP)

## 2024-08-08 PROCEDURE — 6360000002 HC RX W HCPCS: Performed by: INTERNAL MEDICINE

## 2024-08-08 PROCEDURE — 80048 BASIC METABOLIC PNL TOTAL CA: CPT

## 2024-08-08 PROCEDURE — 82948 REAGENT STRIP/BLOOD GLUCOSE: CPT

## 2024-08-08 PROCEDURE — 2580000003 HC RX 258

## 2024-08-08 PROCEDURE — 92526 ORAL FUNCTION THERAPY: CPT

## 2024-08-08 PROCEDURE — 99233 SBSQ HOSP IP/OBS HIGH 50: CPT | Performed by: INTERNAL MEDICINE

## 2024-08-08 PROCEDURE — 99232 SBSQ HOSP IP/OBS MODERATE 35: CPT | Performed by: INTERNAL MEDICINE

## 2024-08-08 PROCEDURE — 36415 COLL VENOUS BLD VENIPUNCTURE: CPT

## 2024-08-08 PROCEDURE — 97116 GAIT TRAINING THERAPY: CPT

## 2024-08-08 PROCEDURE — 85027 COMPLETE CBC AUTOMATED: CPT

## 2024-08-08 PROCEDURE — 83735 ASSAY OF MAGNESIUM: CPT

## 2024-08-08 PROCEDURE — 99232 SBSQ HOSP IP/OBS MODERATE 35: CPT | Performed by: PHYSICIAN ASSISTANT

## 2024-08-08 RX ORDER — BUMETANIDE 1 MG/1
1 TABLET ORAL DAILY
Status: DISCONTINUED | OUTPATIENT
Start: 2024-08-08 | End: 2024-08-09 | Stop reason: HOSPADM

## 2024-08-08 RX ADMIN — Medication 1 TABLET: at 10:09

## 2024-08-08 RX ADMIN — Medication 100 MG: at 10:09

## 2024-08-08 RX ADMIN — DICYCLOMINE HYDROCHLORIDE 10 MG: 10 CAPSULE ORAL at 17:35

## 2024-08-08 RX ADMIN — OXYCODONE HYDROCHLORIDE AND ACETAMINOPHEN 2 TABLET: 5; 325 TABLET ORAL at 19:33

## 2024-08-08 RX ADMIN — INSULIN GLARGINE 5 UNITS: 100 INJECTION, SOLUTION SUBCUTANEOUS at 01:06

## 2024-08-08 RX ADMIN — BUMETANIDE 1 MG: 0.25 INJECTION INTRAMUSCULAR; INTRAVENOUS at 10:14

## 2024-08-08 RX ADMIN — FOLIC ACID 1 MG: 1 TABLET ORAL at 10:10

## 2024-08-08 RX ADMIN — AMIODARONE HYDROCHLORIDE 200 MG: 200 TABLET ORAL at 10:09

## 2024-08-08 RX ADMIN — OXYCODONE HYDROCHLORIDE AND ACETAMINOPHEN 2 TABLET: 5; 325 TABLET ORAL at 10:02

## 2024-08-08 RX ADMIN — INSULIN LISPRO 6 UNITS: 100 INJECTION, SOLUTION INTRAVENOUS; SUBCUTANEOUS at 13:28

## 2024-08-08 RX ADMIN — DICYCLOMINE HYDROCHLORIDE 10 MG: 10 CAPSULE ORAL at 13:28

## 2024-08-08 RX ADMIN — SODIUM CHLORIDE, PRESERVATIVE FREE 10 ML: 5 INJECTION INTRAVENOUS at 21:19

## 2024-08-08 RX ADMIN — ISOSORBIDE MONONITRATE 30 MG: 30 TABLET, EXTENDED RELEASE ORAL at 10:09

## 2024-08-08 RX ADMIN — INSULIN GLARGINE 5 UNITS: 100 INJECTION, SOLUTION SUBCUTANEOUS at 21:27

## 2024-08-08 RX ADMIN — OXYCODONE HYDROCHLORIDE AND ACETAMINOPHEN 2 TABLET: 5; 325 TABLET ORAL at 00:59

## 2024-08-08 RX ADMIN — METOPROLOL SUCCINATE 25 MG: 25 TABLET, FILM COATED, EXTENDED RELEASE ORAL at 10:09

## 2024-08-08 RX ADMIN — ATORVASTATIN CALCIUM 40 MG: 40 TABLET, FILM COATED ORAL at 21:19

## 2024-08-08 RX ADMIN — SODIUM CHLORIDE, PRESERVATIVE FREE 10 ML: 5 INJECTION INTRAVENOUS at 10:30

## 2024-08-08 RX ADMIN — OXYCODONE HYDROCHLORIDE AND ACETAMINOPHEN 2 TABLET: 5; 325 TABLET ORAL at 15:25

## 2024-08-08 RX ADMIN — DICYCLOMINE HYDROCHLORIDE 10 MG: 10 CAPSULE ORAL at 10:14

## 2024-08-08 RX ADMIN — SACUBITRIL AND VALSARTAN 1 TABLET: 24; 26 TABLET, FILM COATED ORAL at 13:33

## 2024-08-08 RX ADMIN — SACUBITRIL AND VALSARTAN 1 TABLET: 24; 26 TABLET, FILM COATED ORAL at 21:19

## 2024-08-08 RX ADMIN — INSULIN LISPRO 4 UNITS: 100 INJECTION, SOLUTION INTRAVENOUS; SUBCUTANEOUS at 21:27

## 2024-08-08 RX ADMIN — HYDROXYZINE PAMOATE 25 MG: 25 CAPSULE ORAL at 13:33

## 2024-08-08 ASSESSMENT — PAIN DESCRIPTION - FREQUENCY
FREQUENCY: CONTINUOUS

## 2024-08-08 ASSESSMENT — PAIN SCALES - GENERAL
PAINLEVEL_OUTOF10: 5
PAINLEVEL_OUTOF10: 4
PAINLEVEL_OUTOF10: 7
PAINLEVEL_OUTOF10: 5
PAINLEVEL_OUTOF10: 3
PAINLEVEL_OUTOF10: 8
PAINLEVEL_OUTOF10: 3
PAINLEVEL_OUTOF10: 7
PAINLEVEL_OUTOF10: 9
PAINLEVEL_OUTOF10: 8

## 2024-08-08 ASSESSMENT — PAIN DESCRIPTION - DIRECTION
RADIATING_TOWARDS: DOWN LEGS
RADIATING_TOWARDS: DOWN LEGS

## 2024-08-08 ASSESSMENT — PAIN DESCRIPTION - ORIENTATION
ORIENTATION: MID;LOWER
ORIENTATION: MID
ORIENTATION: MID
ORIENTATION: MID;LOWER
ORIENTATION: LOWER
ORIENTATION: LOWER

## 2024-08-08 ASSESSMENT — PAIN DESCRIPTION - DESCRIPTORS
DESCRIPTORS: ACHING;CRAMPING
DESCRIPTORS: ACHING;CRAMPING
DESCRIPTORS: ACHING;DISCOMFORT
DESCRIPTORS: ACHING;SHARP
DESCRIPTORS: ACHING;SHARP
DESCRIPTORS: ACHING;DISCOMFORT

## 2024-08-08 ASSESSMENT — PAIN DESCRIPTION - LOCATION
LOCATION: BACK

## 2024-08-08 ASSESSMENT — PAIN DESCRIPTION - PAIN TYPE
TYPE: CHRONIC PAIN

## 2024-08-08 ASSESSMENT — PAIN SCALES - WONG BAKER
WONGBAKER_NUMERICALRESPONSE: HURTS A LITTLE BIT
WONGBAKER_NUMERICALRESPONSE: HURTS A LITTLE BIT

## 2024-08-08 ASSESSMENT — PAIN DESCRIPTION - ONSET
ONSET: ON-GOING

## 2024-08-08 NOTE — PROGRESS NOTES
[facility]  INPATIENT SPEECH THERAPY  STRZ ICU STEPDOWN TELEMETRY 4K  DISCHARGE NOTE    TIME   SLP Individual Minutes  Time In: 1442  Time Out: 1450  Minutes: 8  Timed Code Treatment Minutes: 0 Minutes       Date: 2024  Patient Name: Garrett Duncan      CSN: [id number]   : 1965  (59 y.o.)  Gender: male   Referring Physician:  Newman-Waterhouse, Aundrea N, DO   Diagnosis: lower back pain   Precautions: Fall risk, seizure precautions   Current Diet: Regular diet and thin liquids   Respiratory Status: Room Air  Swallowing Strategies: Standard Universal Swallow Precautions  Date of Last MBS/FEES: Not Applicable    Pain:  Wishes for pain medications with nursing staff notified by ST after session.     Subjective:  Session approved by RNSara. Patient seen sitting at EOB. Alert and cooperative.     Short-Term Goals:  SHORT TERM GOAL #1:  Goal 1: Patient will consume regular diet with thin liquids without s/s of aspiration in order to safely maintain adequate hydration and nutrition.  INTERVENTIONS: Consumption of thin water by cup x3 without overt s/s of airway invasion. Patient reports presence of a constant \"tickle\" in his throat s/t h/o smoking that he reports is present with and without PO intake. Reviewed s/s of airway invasion. Patient reports consumption of current diet without overt s/s of airway invasion.     Completed review and education of the following safe swallowing strategies/precautions--  *Sit upright (with feet down)  *Small bites/drinks  *Alternate solids/liquids    WBC   Date Value Ref Range Status   2024 5.7 4.8 - 10.8 thou/mm3 Final       Right Upper Lobe: Clear  Right Middle Lobe: Diminished  Right Lower Lobe: Diminished  Left Upper Lobe: Clear  Left Lower Lobe: Diminished    Impression: CXR: 24   IMPRESSION:  1. Cardiomegaly and prominence of the central pulmonary vascularity.  2. Left basilar atelectasis.  3. Tiny bilateral pleural effusions are  questioned.    Recommendations to resume diet. Patient at baseline level function. Weaned off of O2 support. ST recommendations to sign off. Please reconsult should further needs arise.    Long-Term Goals:   No established LTG's given short ELOS       Functional Oral Intake Scale: Total Oral Intake: 7.  Total oral intake with no restrictions    EDUCATION:  Learner: Patient  Education:  Reviewed diet and strategies, Reviewed signs, symptoms and risks of aspiration, and Reviewed ST goals and Plan of Care  Evaluation of Education: Verbalizes understanding    ASSESSMENT/PLAN:  Activity Tolerance:  Patient tolerance of  treatment: good.      Assessment/Plan: Patient discharged from Speech Therapy at this time due to return to baseline function.  Discharge Disposition: home.  Continued Speech Therapy Services recommended: No.     Discharge Recommendations: Home with Home Exercise Program     Connie Staley M.S. CCC-SLP [id number] 8/8/2024

## 2024-08-08 NOTE — PROGRESS NOTES
[facility]   Nephrology progress note  8/8/2024, 10:14 AM      Pt Name:    Garrett Duncan  MRN:     [id number]     YOB: 1965  Admit Date:    8/6/2024  7:35 AM    Chief Complaint: Nephrology following for BABAK/CKD and possibly fluid overload.    Subjective:  Patient seen and examined  No chest pain no significant shortness of breath  Currently off the high flow oxygen on nasal cannula  Urine output decent    Objective:  24HR INTAKE/OUTPUT:    Intake/Output Summary (Last 24 hours) at 8/8/2024 1014  Last data filed at 8/8/2024 0953  Gross per 24 hour   Intake 1370 ml   Output 1275 ml   Net 95 ml        Admission weight: 127 kg (280 lb)  Wt Readings from Last 3 Encounters:   08/07/24 118.2 kg (260 lb 9.3 oz)   07/26/24 125.3 kg (276 lb 3.2 oz)   07/18/24 124.7 kg (275 lb)        Vitals :   Vitals:    08/08/24 0059 08/08/24 0129 08/08/24 0334 08/08/24 0955   BP:   (!) 154/80 (!) 169/100   Pulse:   66    Resp: 16 16 18 20   Temp:   98.3 °F (36.8 °C) 98 °F (36.7 °C)   TempSrc:   Oral Oral   SpO2:   98% 97%   Weight:       Height:           Physical examination  General Appearance:  Well developed. No distress  Mouth/Throat:  Oral mucosa moist  Neck:  Supple, no JVD  Lungs:  Breath sounds: clear  Heart::  S1,S2 heard  Abdomen:  Soft, non - tender  Musculoskeletal:  Edema -noted bilaterally getting better    Medications:  Infusion:    sodium chloride      dextrose       Meds:    sacubitril-valsartan  1 tablet Oral BID    thiamine  100 mg Oral Daily    multivitamin  1 tablet Oral Daily    metoprolol succinate  25 mg Oral Daily    isosorbide mononitrate  30 mg Oral Daily    sodium chloride flush  5-40 mL IntraVENous 2 times per day    [Held by provider] ALPRAZolam  0.5 mg Oral Nightly    amiodarone  200 mg Oral Daily    [Held by provider] thiamine  100 mg Oral Daily    folic acid  1 mg Oral Daily    [Held by provider] ticagrelor  90 mg Oral BID    [Held by provider] hydrALAZINE  100 mg Oral TID     [Held by provider] isosorbide mononitrate  120 mg Oral Daily    [Held by provider] metoprolol succinate  100 mg Oral Daily    [Held by provider] bumetanide  2 mg Oral BID    dicyclomine  10 mg Oral TID WC    [Held by provider] lisinopril  5 mg Oral Daily    atorvastatin  40 mg Oral Nightly    insulin lispro  0-8 Units SubCUTAneous TID     insulin lispro  0-4 Units SubCUTAneous Nightly    lidocaine  1 patch TransDERmal Daily    bumetanide  1 mg IntraVENous BID       Lab Data :  CBC:   Recent Labs     08/06/24  0854 08/07/24  0201 08/08/24  0847   WBC 7.4 7.1 5.7   HGB 9.0* 8.3* 8.9*   HCT 29.6* 26.9* 30.2*    128* 133       CMP:  Recent Labs     08/06/24  0854 08/07/24  0201 08/08/24  0758    139 136   K 4.1 4.3 4.3    102 101   CO2 19* 23 19*   BUN 69* 63* 69*   CREATININE 3.8* 3.5* 3.8*   GLUCOSE 249* 123* 124*   CALCIUM 8.2* 8.2* 8.2*   MG 2.9* 2.8* 2.8*       Hepatic:   Recent Labs     08/07/24  0201   AST 29   ALT 16   BILITOT 0.4   ALKPHOS 81         Assessment and Plan:  Renal -CKD stage IV overall renal function appears to be stable close to baseline  Chest x-ray shows some CHF, bilateral lower extremity edema and elevated proBNP.  Decent urine output creatinine slightly better however a little bit worse today.  Was also started on Entresto (due to his advanced renal function if renal function worsens further we will need to discontinue Entresto).  Volume status getting better  I will decrease the Bumex to 1 mg p.o. twice daily monitor renal function closely  Electrolytes -within normal limits  Acid-base status patient's bicarbonate is low, pH is reasonable 7.34 mostly metabolic acidosis due to CKD  Possible A-fib amnio drip  Essential hypertension  Diabetes mellitus  History of alcohol abuse  Meds reviewed and discussed with patient     Samuel Kathleen MD  [facility]    This report has been created using voice recognition software. It may contain minor errors

## 2024-08-08 NOTE — CARE COORDINATION
8/8/24, 2:35 PM EDT    DISCHARGE ON GOING EVALUATION    [facility] day: 2  Location: [facility]14/014-A Reason for admit: Hypomagnesemia [E83.42]  Lower back pain [M54.50]  Hyperglycemia [R73.9]  Cigarette smoker [F17.210]  Uncontrolled hypertension [I10]  Intractable back pain [M54.9]  Difficulty in walking [R26.2]     Barriers to Discharge:   Recurrent Back Pain w L1-L5 DDD w Stenosis/BABAK/VT  History: CHF, Cocaine/Active Smoker, EF 25%, AICD, CKD, DM, CABG, PCI, Alcohol Use, IVDA (plans future lumbar spine fusion L1-L5)  (+) ETOH 0.04  H 8.9, Creatinine 3.8 (baseline 3.5-4.0); monitor  IV Diuresing  Await Ortho Recommendations for Surgery  Client assigned to 8A; Hand-Off given to LIZBETH Wayne CM  Patient Goals/Plan/Treatment Preferences: plans home alone; Addiction Services following, completed [place] rehab in past; daughter/SHADI Mooney, CHF Clinic, has RW, current w [facility]; ambulates 12 feet w therapy  PCP: Leonel Adorno, APRN - CNP  Readmission Risk Score: 42.3

## 2024-08-08 NOTE — PROGRESS NOTES
Hospitalist Progress Note  Internal Medicine Resident      Patient: Garrett Duncan 59 y.o. male      Unit/Bed: 4K-14/014-A    Admit Date: 8/6/2024      ASSESSMENT AND PLAN  Active Problems  Acute on chronic lower back pain 2/2 L1-5 degenerative disc disease with stenosis and claudication: H/o DDD with previous interventions, redemonstrated on 6/20/24 CT lumbar. Patient was scheduled for surgery outpatient, but was postponed secondary to hospitalization on 7/16/24 for anemia. Was given 1x dilaudid, hydroxyzine, and alprazolam in ED. On bentyl and lidocaine patch at home for pain.   Continue Lidocaine patch, bentyl   Tylenol and percocet as needed for pain  Avoid high dose narcotics due to risk of sedation  PT  Orthopedic surgery consulted, surgery likely week of 8/12-8/16.  OP pain clinic follow up for pain management   Acute hypoxic respiratory failure, paroxysmal nocturnal dyspnea: Suspect 2/2 DUSTIN. Pt was noted to have witnessed desaturations on RA when falling asleep on 8/6/24 and 8/7/24. He was placed on high flow nasal cannula the first night, second night required 2 LNC. During the day time he was able to be weaned off. Baseline RA.   Supplemental O2 as needed  Outpatient follow up for DUSTIN  Acute exacerbation of systolic HFrEF s/p ICD: Suspect 2/2 noncompliance. Last echo 7/23/24 EF 25%, LV dilation, severe global hypokinesis, severely dilated LA. GDMT: lisinopril, farxiga, toprol, IMDUR, hydralazine. Pt reports not taking GDMT for past week while binge drinking.   Bumex 1 mg IV twice daily   Restarted toprol 25 mg daily and IMDUR 30 mg daily, will gradually increase to home dose as tolerated.   Started entresto  Daily weights and strict I&Os  2g Na and 2 L fluid restriction  Cardiology consulted  Follow up in CHF clinic   Acute on chronic elevation of troponin in setting of Dilated and Ischemic Cardiomyopathy and demand: Pt presented with chest pain on admission. Trop , repeat 250. EKG showed first  degree AV block with PACs. H/o ICD, HFrEF, CKD4, CAD. Last [facility] 2022 not released. Last echo 7/23/24 EF 25%, LV dilation, severe global hypokinesis, severely dilated LA.   Will obtain STAT EKG and troponin if CP develops.    Resolved Problems  Acute metabolic encephalopathy: likely multifactorial with polysubstance use and respiratory failure. Patient reports binge drinking for 7 days prior to admission.   Chronic Conditions (reviewed and stable unless otherwise stated)  Paroxysmal Atrial Fibrillation: Pt has had a successful MYRIAM-guided cardioversion in 2022. Currently RC on Toprol. Rhythm controlled on amiodarone. Not currently on AC due to concern for hemoptysis/hematochezia and potential high risk spinal surgery, however takes warfarin at home. INR 7/27/24 2.19, recheck ordered. CHADS-VASC 4.     IDDMII: Last A1c 6/13/24 6.7%. On farxiga, linagliptin, and lantus 5 U nightly at home.   Continue Lantus 5 units nightly  LDSSI  POCT glucose checks  Hypoglycemia protocol  Polysubstance use disorder: history of alcohol use requiring rehab, patient relapsed 1 week ago. Pt is a smoker. Denies history of seizures or DT. Last drink 8/5/24. ETOH level 0.04.   Pt denied CIWA ativan due to history of hallucinations. Did not initiate phenobarbital as it has already been past 48 hour donald and patient is doing well on physical exam.   Vistaril PO as needed   Addiction services consulted  CKD4: Cr 3.5 (baseline). eGFR 19. Avoid nephrotoxic medications.   Anxiety: xanax held due to concern for repeat encephalopathy.   Chronic normocytic anemia (secondary to chronic disease, iron deficiency anemia): previously required transfusion.   Coronary artery disease, s/p CABG x3 (2014): on beta-blocker/statin/ticagrelor at home. Continued statin, slowly restarting beta-blocker and holding ticagrelor due to risk of bleed. Last stress test not released. Last [facility] not released.       URGENCY OF SURGERY:-  [] EMERGENT    []URGENT

## 2024-08-08 NOTE — PROGRESS NOTES
Cardiology Progress Note      Patient:  Garrett Duncan  YOB: 1965  MRN: [id number]   Acct: [id number]  Admit Date:  8/6/2024  Primary Cardiologist: Fany Guzmán MD    Note per dr sanches \"CHIEF COMPLAINT: Lower back pain        HPI: This is a pleasant 59 y.o. male w/ PMHx HFrEF s/p AICD (2015), alcohol abuse, CAD s/p CABG x3 (2014), CKD4, T2DM, GERD, HTN, pAfib who presents with persistent lower back pain. Has known lower back issues of L1-5 for which he was planning to have an unspecified spinal surgery which was postponed 2/2 hospitalization for low Hgb. Per chart review, pt has had multiple admissions this year for various issues, including CHF exacerbation. Cardiology is consulted for CHF exacerbation. Patient at presents only reports some back pain and denies worsening shortness of breath from baseline.  Denies orthopnea.     \"    Subjective (Events in last 24 hours): pt awake and alert.  NAD. No cp or sob.  Still with leg edema, chronic for patient.  Back pain controlled today  On RA      Objective:   BP (!) 169/100   Pulse 66   Temp 98 °F (36.7 °C) (Oral)   Resp 20   Ht 1.854 m (6' 1\")   Wt 118.2 kg (260 lb 9.3 oz)   SpO2 98%   BMI 34.38 kg/m²        TELEMETRY: nsr with few PVCs    Physical Exam:  General Appearance: alert and oriented to person, place and time, in no acute distress  Cardiovascular: normal rate, regular rhythm, normal S1 and S2, no murmurs, rubs, clicks, or gallops, distal pulses intact, no carotid bruits, no JVD  Pulmonary/Chest: clear to auscultation bilaterally- no wheezes, rales or rhonchi, normal air movement, no respiratory distress  Abdomen: soft, non-tender, non-distended, normal bowel sounds, no masses Extremities: +3 ble edema, pulse   Skin: warm and dry, no rash or erythema  Head: normocephalic and atraumatic  Eyes: pupils equal, round, and reactive to light  Neck: supple and non-tender without mass, no thyromegaly       Medications:    sacubitril-valsartan   1 tablet Oral BID    bumetanide  1 mg Oral Daily    thiamine  100 mg Oral Daily    multivitamin  1 tablet Oral Daily    metoprolol succinate  25 mg Oral Daily    isosorbide mononitrate  30 mg Oral Daily    sodium chloride flush  5-40 mL IntraVENous 2 times per day    [Held by provider] ALPRAZolam  0.5 mg Oral Nightly    amiodarone  200 mg Oral Daily    [Held by provider] thiamine  100 mg Oral Daily    folic acid  1 mg Oral Daily    [Held by provider] ticagrelor  90 mg Oral BID    [Held by provider] hydrALAZINE  100 mg Oral TID    [Held by provider] isosorbide mononitrate  120 mg Oral Daily    [Held by provider] metoprolol succinate  100 mg Oral Daily    [Held by provider] bumetanide  2 mg Oral BID    dicyclomine  10 mg Oral TID WC    [Held by provider] lisinopril  5 mg Oral Daily    atorvastatin  40 mg Oral Nightly    insulin lispro  0-8 Units SubCUTAneous TID WC    insulin lispro  0-4 Units SubCUTAneous Nightly    lidocaine  1 patch TransDERmal Daily      sodium chloride      dextrose       [Held by provider] ALPRAZolam, 0.5 mg, Nightly PRN  oxyCODONE-acetaminophen, 1 tablet, Q4H PRN   Or  oxyCODONE-acetaminophen, 2 tablet, Q4H PRN  hydrOXYzine pamoate, 25 mg, TID PRN  insulin glargine, 5 Units, Nightly PRN  sodium chloride flush, 5-40 mL, PRN  sodium chloride, , PRN  ondansetron, 4 mg, Q8H PRN   Or  ondansetron, 4 mg, Q6H PRN  polyethylene glycol, 17 g, Daily PRN  acetaminophen, 650 mg, Q6H PRN   Or  acetaminophen, 650 mg, Q6H PRN  [Held by provider] glucose, 4 tablet, PRN  dextrose bolus, 125 mL, PRN   Or  dextrose bolus, 250 mL, PRN  glucagon (rDNA), 1 mg, PRN  dextrose, , Continuous PRN  hydrALAZINE, 5 mg, Q6H PRN          Lab Data:    Cardiac Enzymes:  No results for input(s): \"CKTOTAL\", \"CKMB\", \"CKMBINDEX\", \"TROPONINI\" in the last 72 hours.    CBC:   Lab Results   Component Value Date/Time    WBC 5.7 08/08/2024 08:47 AM    RBC 3.20 08/08/2024 08:47 AM    RBC 4.75 11/03/2011 04:59 PM    HGB 8.9 08/08/2024

## 2024-08-08 NOTE — PROGRESS NOTES
Perfect serve message to Dr AFUA Rubio CNP:  Patient has AICD ,  notes state that batteries are d/t die 8/12ish.  patient may be having ortho/spine surgery early next week.  Please address so there is no delaying patient care.  Thank you..    Reply via perfect serve:  He will be watched by pacemaker clinic

## 2024-08-08 NOTE — PROGRESS NOTES
[facility]  INPATIENT PHYSICAL THERAPY  EVALUATION  [facility] ICU STEPDOWN TELEMETRY 4K - 4K-14/014-A    Discharge Recommendations: Discharge Recommendations: Continue to assess pending progress  Equipment Recommendations: Equipment Needed: No    Time In: 1053  Time Out: 1109  Timed Code Treatment Minutes: 8 Minutes  Minutes: 16          Date: 2024  Patient Name: Garrett Duncan,  Gender:  male        MRN: [id number]  : 1965  (59 y.o.)      Referring Practitioner: Garrett Naik MD  Diagnosis: Lower back pain  Additional Pertinent Hx: 59-year-old male who presented to [facility] ED on 2024 secondary to persistent lower back pain.  He rates it as a 10 out of 10 with radiation down into his legs.  He states that he takes no medications at home for his pain.  He states that he has a known underlying pathology at the level of L1-5 for which he endorsed planning on having an unspecified spinal surgery which was postponed secondary to hospitalization with low hemoglobin. He endorses a strong history of alcohol use disorder for which he has presented himself to rehab.  He states that he has not had blackouts and denies use of any prescription medications while consuming alcohol.  He states that he is typically drinking 4 out of 7 days of the week and drinking liquor.  The patient states that he relapsed on alcohol 1 week prior to admission.  The patient additionally endorses smoking 1 pack/day for the last 30 years, utilization of chewing tobacco.  The patient denies the use of electronic cigarettes, marijuana, recreational drugs.     Restrictions/Precautions:  Restrictions/Precautions: General Precautions, Fall Risk    Subjective:  Chart Reviewed: Yes  Patient assessed for rehabilitation services?: Yes  Subjective: RN approved session. Pt pleasant and agreeable to therapy    General:  Overall Orientation Status: Within Functional Limits  Vision: Within Functional Limits  Hearing: Within functional  Term Goals : NA d/t short ELOS    Following session, patient left in safe position with all fall risk precautions in place.

## 2024-08-08 NOTE — PROGRESS NOTES
Pharmacy Medication History Note      List of current medications patient is taking is complete.    Source of information: Patient    Changes made to medication list:  Medications removed (include reason, ex. therapy complete or physician discontinued):  No Changes     Medications added/doses adjusted:  No Changes    Other notes (ex. Recent course of antibiotics, Coumadin dosing):  Patient takes Warfarin 2.5 mg daily on Monday and Friday and then 5 mg daily on the other days.  Patient get's Warfarin managed at [facility]  Denies use of other OTC or herbal medications.    Allergies reviewed    Electronically signed by Jonathon Archibald on 8/8/2024 at 10:39 AM

## 2024-08-09 VITALS
DIASTOLIC BLOOD PRESSURE: 81 MMHG | BODY MASS INDEX: 34.54 KG/M2 | HEIGHT: 73 IN | TEMPERATURE: 97.9 F | OXYGEN SATURATION: 97 % | SYSTOLIC BLOOD PRESSURE: 146 MMHG | WEIGHT: 260.58 LBS | HEART RATE: 61 BPM | RESPIRATION RATE: 18 BRPM

## 2024-08-09 LAB
ANION GAP SERPL CALC-SCNC: 11 MEQ/L (ref 8–16)
BUN SERPL-MCNC: 65 MG/DL (ref 7–22)
CALCIUM SERPL-MCNC: 8.5 MG/DL (ref 8.5–10.5)
CHLORIDE SERPL-SCNC: 101 MEQ/L (ref 98–111)
CO2 SERPL-SCNC: 25 MEQ/L (ref 23–33)
CREAT SERPL-MCNC: 3.8 MG/DL (ref 0.4–1.2)
DEPRECATED RDW RBC AUTO: 74.4 FL (ref 35–45)
ERYTHROCYTE [DISTWIDTH] IN BLOOD BY AUTOMATED COUNT: 22.6 % (ref 11.5–14.5)
GFR SERPL CREATININE-BSD FRML MDRD: 17 ML/MIN/1.73M2
GLUCOSE BLD STRIP.AUTO-MCNC: 148 MG/DL (ref 70–108)
GLUCOSE BLD STRIP.AUTO-MCNC: 283 MG/DL (ref 70–108)
GLUCOSE SERPL-MCNC: 133 MG/DL (ref 70–108)
HCT VFR BLD AUTO: 30.9 % (ref 42–52)
HGB BLD-MCNC: 9 GM/DL (ref 14–18)
MAGNESIUM SERPL-MCNC: 2.5 MG/DL (ref 1.6–2.4)
MCH RBC QN AUTO: 27 PG (ref 26–33)
MCHC RBC AUTO-ENTMCNC: 29.1 GM/DL (ref 32.2–35.5)
MCV RBC AUTO: 92.8 FL (ref 80–94)
PLATELET # BLD AUTO: 135 THOU/MM3 (ref 130–400)
PMV BLD AUTO: 9.7 FL (ref 9.4–12.4)
POTASSIUM SERPL-SCNC: 4 MEQ/L (ref 3.5–5.2)
RBC # BLD AUTO: 3.33 MILL/MM3 (ref 4.7–6.1)
SODIUM SERPL-SCNC: 137 MEQ/L (ref 135–145)
WBC # BLD AUTO: 5.8 THOU/MM3 (ref 4.8–10.8)

## 2024-08-09 PROCEDURE — 97166 OT EVAL MOD COMPLEX 45 MIN: CPT

## 2024-08-09 PROCEDURE — 2580000003 HC RX 258

## 2024-08-09 PROCEDURE — 36415 COLL VENOUS BLD VENIPUNCTURE: CPT

## 2024-08-09 PROCEDURE — 6370000000 HC RX 637 (ALT 250 FOR IP): Performed by: INTERNAL MEDICINE

## 2024-08-09 PROCEDURE — 6370000000 HC RX 637 (ALT 250 FOR IP)

## 2024-08-09 PROCEDURE — 99232 SBSQ HOSP IP/OBS MODERATE 35: CPT | Performed by: INTERNAL MEDICINE

## 2024-08-09 PROCEDURE — 80048 BASIC METABOLIC PNL TOTAL CA: CPT

## 2024-08-09 PROCEDURE — 83735 ASSAY OF MAGNESIUM: CPT

## 2024-08-09 PROCEDURE — 97535 SELF CARE MNGMENT TRAINING: CPT

## 2024-08-09 PROCEDURE — 6370000000 HC RX 637 (ALT 250 FOR IP): Performed by: PHYSICIAN ASSISTANT

## 2024-08-09 PROCEDURE — 85027 COMPLETE CBC AUTOMATED: CPT

## 2024-08-09 PROCEDURE — 82948 REAGENT STRIP/BLOOD GLUCOSE: CPT

## 2024-08-09 RX ORDER — ISOSORBIDE MONONITRATE 30 MG/1
30 TABLET, EXTENDED RELEASE ORAL DAILY
Qty: 30 TABLET | Refills: 0 | Status: SHIPPED | OUTPATIENT
Start: 2024-08-10

## 2024-08-09 RX ORDER — MULTIVITAMIN WITH IRON
1 TABLET ORAL DAILY
Qty: 90 TABLET | Refills: 0 | COMMUNITY
Start: 2024-08-10

## 2024-08-09 RX ORDER — METOPROLOL SUCCINATE 25 MG/1
25 TABLET, EXTENDED RELEASE ORAL DAILY
Qty: 30 TABLET | Refills: 0 | Status: SHIPPED | OUTPATIENT
Start: 2024-08-10

## 2024-08-09 RX ORDER — OXYCODONE HYDROCHLORIDE AND ACETAMINOPHEN 5; 325 MG/1; MG/1
1-2 TABLET ORAL EVERY 4 HOURS PRN
Qty: 30 TABLET | Refills: 0 | Status: SHIPPED | OUTPATIENT
Start: 2024-08-09 | End: 2024-08-14 | Stop reason: SDUPTHER

## 2024-08-09 RX ORDER — BUMETANIDE 2 MG/1
1 TABLET ORAL DAILY PRN
Qty: 30 TABLET | Refills: 0 | Status: SHIPPED
Start: 2024-08-09 | End: 2024-08-14

## 2024-08-09 RX ORDER — BUMETANIDE 1 MG/1
1 TABLET ORAL DAILY
Qty: 30 TABLET | Refills: 0 | Status: SHIPPED
Start: 2024-08-09 | End: 2024-08-14

## 2024-08-09 RX ORDER — WARFARIN SODIUM 5 MG/1
5 TABLET ORAL
Status: COMPLETED | OUTPATIENT
Start: 2024-08-09 | End: 2024-08-09

## 2024-08-09 RX ORDER — SENNA AND DOCUSATE SODIUM 50; 8.6 MG/1; MG/1
1 TABLET, FILM COATED ORAL DAILY
Qty: 30 TABLET | Refills: 0 | Status: SHIPPED | OUTPATIENT
Start: 2024-08-09

## 2024-08-09 RX ADMIN — SACUBITRIL AND VALSARTAN 1 TABLET: 24; 26 TABLET, FILM COATED ORAL at 09:05

## 2024-08-09 RX ADMIN — WARFARIN SODIUM 5 MG: 5 TABLET ORAL at 15:24

## 2024-08-09 RX ADMIN — HYDROXYZINE PAMOATE 25 MG: 25 CAPSULE ORAL at 00:44

## 2024-08-09 RX ADMIN — INSULIN LISPRO 4 UNITS: 100 INJECTION, SOLUTION INTRAVENOUS; SUBCUTANEOUS at 13:32

## 2024-08-09 RX ADMIN — ISOSORBIDE MONONITRATE 30 MG: 30 TABLET, EXTENDED RELEASE ORAL at 09:05

## 2024-08-09 RX ADMIN — AMIODARONE HYDROCHLORIDE 200 MG: 200 TABLET ORAL at 09:05

## 2024-08-09 RX ADMIN — OXYCODONE HYDROCHLORIDE AND ACETAMINOPHEN 2 TABLET: 5; 325 TABLET ORAL at 05:02

## 2024-08-09 RX ADMIN — DICYCLOMINE HYDROCHLORIDE 10 MG: 10 CAPSULE ORAL at 09:05

## 2024-08-09 RX ADMIN — DICYCLOMINE HYDROCHLORIDE 10 MG: 10 CAPSULE ORAL at 13:17

## 2024-08-09 RX ADMIN — OXYCODONE HYDROCHLORIDE AND ACETAMINOPHEN 2 TABLET: 5; 325 TABLET ORAL at 00:44

## 2024-08-09 RX ADMIN — OXYCODONE HYDROCHLORIDE AND ACETAMINOPHEN 2 TABLET: 5; 325 TABLET ORAL at 09:10

## 2024-08-09 RX ADMIN — HYDROXYZINE PAMOATE 25 MG: 25 CAPSULE ORAL at 09:05

## 2024-08-09 RX ADMIN — METOPROLOL SUCCINATE 25 MG: 25 TABLET, FILM COATED, EXTENDED RELEASE ORAL at 09:05

## 2024-08-09 RX ADMIN — Medication 100 MG: at 09:05

## 2024-08-09 RX ADMIN — SODIUM CHLORIDE, PRESERVATIVE FREE 10 ML: 5 INJECTION INTRAVENOUS at 09:07

## 2024-08-09 RX ADMIN — BUMETANIDE 1 MG: 1 TABLET ORAL at 09:05

## 2024-08-09 RX ADMIN — FOLIC ACID 1 MG: 1 TABLET ORAL at 09:05

## 2024-08-09 RX ADMIN — Medication 1 TABLET: at 09:05

## 2024-08-09 RX ADMIN — OXYCODONE HYDROCHLORIDE AND ACETAMINOPHEN 2 TABLET: 5; 325 TABLET ORAL at 13:17

## 2024-08-09 ASSESSMENT — PAIN DESCRIPTION - LOCATION
LOCATION: BACK
LOCATION: BACK;LEG
LOCATION: BACK

## 2024-08-09 ASSESSMENT — PAIN DESCRIPTION - PAIN TYPE
TYPE: CHRONIC PAIN
TYPE: CHRONIC PAIN

## 2024-08-09 ASSESSMENT — PAIN DESCRIPTION - DESCRIPTORS
DESCRIPTORS: SHOOTING
DESCRIPTORS: SHARP;SHOOTING

## 2024-08-09 ASSESSMENT — PAIN SCALES - GENERAL
PAINLEVEL_OUTOF10: 8
PAINLEVEL_OUTOF10: 6
PAINLEVEL_OUTOF10: 8
PAINLEVEL_OUTOF10: 8
PAINLEVEL_OUTOF10: 6
PAINLEVEL_OUTOF10: 7
PAINLEVEL_OUTOF10: 8

## 2024-08-09 ASSESSMENT — PAIN DESCRIPTION - ONSET: ONSET: ON-GOING

## 2024-08-09 ASSESSMENT — PAIN DESCRIPTION - ORIENTATION
ORIENTATION: LOWER
ORIENTATION: LOWER

## 2024-08-09 ASSESSMENT — PAIN SCALES - WONG BAKER: WONGBAKER_NUMERICALRESPONSE: HURTS A LITTLE BIT

## 2024-08-09 ASSESSMENT — PAIN DESCRIPTION - FREQUENCY
FREQUENCY: CONTINUOUS
FREQUENCY: CONTINUOUS

## 2024-08-09 NOTE — PROGRESS NOTES
Evaluated cost of Entresto for Stephenie Odessa, [facility]    Entresto: $12.00/month, patient eligible for free 30-day trial from [facility].    Thank you,  Katie Caldwell, [facility] - Prescription Assistance ([phone]) 8/9/2024,10:12 AM

## 2024-08-09 NOTE — PROGRESS NOTES
Vitals: Nurse checked vitals prior to session    Social/Functional History:  Lives With: Alone  Type of Home: Apartment  Home Layout: One level  Home Access: Stairs to enter without rails  Entrance Stairs - Number of Steps: 2  Home Equipment: Walker - Rolling   Bathroom Shower/Tub: Tub/Shower unit  Bathroom Toilet: Standard  Bathroom Equipment: Grab bars in shower  Bathroom Accessibility: Accessible       ADL Assistance: Independent  Homemaking Assistance: Needs assistance (shares responsibility with sister and daughters)  Ambulation Assistance: Independent  Transfer Assistance: Independent    Active : Yes  Occupation: On disability  Type of Occupation: [employer]  Additional Comments: no device to ambulate, indpendent with ADLs and had help from family for IADLs prn    VISION:WNL    HEARING:  WNL    COGNITION: Slow Processing, Decreased Insight, and Decreased Safety Awareness    RANGE OF MOTION:  Bilateral Upper Extremity:  WFL    STRENGTH:  Bilateral Upper Extremity:  Impaired - 4-/5 for shoulder flexion limited by pain in low back, all distal joints WFL    SENSATION:   WFL    ADL:   Footwear Management: Stand By Assistance, X 1, and with increased time for completion.  Sat EOB to jcarlos/doff B slip on shoes by bending down to reach foot, pt stated that he is able to complete task but it irritates his back. Pt educated on potentially trailing LHAE on future trials  Toilet Transfer: Stand By Assistance and X 1. Transferred to STS with BUE grab bars and SBA, no LOB .    IADL:   Not Tested    BALANCE:  Sitting Balance:  Stand By Assistance, X 1. Sat EOB for ~7 min total during session to complete static and dynamic tasks with 1-2 UE release with SBA and no LOB  Standing Balance: Stand By Assistance, X 1. Stood for ~2 min total during session with BUE support on RW with SBA and no LOB demonstrated     BED MOBILITY:  Supine to Sit: Stand By Assistance, X 1, with head of bed raised, with rail, with increased time for  increase safety and independence with ADL's for improved functional independence and quality of life.    Discharge Recommendations:  Continue to assess pending progress, Home with assist PRN    Patient Education:          Patient Education  Education Given To: Patient  Education Provided: Role of Therapy;Plan of Care;ADL Adaptive Strategies;Transfer Training  Education Method: Verbal;Demonstration  Barriers to Learning: None  Education Outcome: Verbalized understanding    Equipment Recommendations:  Equipment Needed: No  Other: continue to monitor    Plan:  Times Per Week: 5x  Current Treatment Recommendations: Functional mobility training, Endurance training, Safety education & training, Patient/Caregiver education & training, Equipment evaluation, education, & procurement, Pain management, Self-Care / ADL, Home management training.  See long-term goal time frame for expected duration of plan of care.  If no long-term goals established, a short length of stay is anticipated.    Goals:  Patient goals : to be pain free and return to work  Short Term Goals  Time Frame for Short Term Goals: by discharge  Short Term Goal 1: Pt will complete LB dressing/bathing with supervision and LHAE prn for increased ease in dressing and bathing  Short Term Goal 2: Pt will ambulate Community level distances with supervision and LRAD prn with 0 vebral cues for safety in prep for ambulation to and from work  Short Term Goal 3: Pt will complete IADL tasks with CGA in prep for return to laundry and dishwashing tasks.  Short Term Goal 4: Pt will complete various functional transfers with supervision in prep for safe shower transfers.  Short Term Goal 5: Pt will tolerate 10 min standing to complete sink side grooming tasks with supervision in prep for increased activity tolerance with ADL routine  Long Term Goals  Time Frame for Long Term Goals : none due to ELOS    AM-PAC Inpatient Daily Activity Raw Score: 20  AM-PAC Inpatient ADL

## 2024-08-09 NOTE — PLAN OF CARE
Problem: Discharge Planning  Goal: Discharge to home or other facility with appropriate resources  Outcome: Progressing     Problem: Pain  Goal: Verbalizes/displays adequate comfort level or baseline comfort level  Outcome: Progressing     Problem: Safety - Adult  Goal: Free from fall injury  Outcome: Progressing     Problem: ABCDS Injury Assessment  Goal: Absence of physical injury  Outcome: Progressing     Problem: Skin/Tissue Integrity  Goal: Absence of new skin breakdown  Description: 1.  Monitor for areas of redness and/or skin breakdown  2.  Assess vascular access sites hourly  3.  Every 4-6 hours minimum:  Change oxygen saturation probe site  4.  Every 4-6 hours:  If on nasal continuous positive airway pressure, respiratory therapy assess nares and determine need for appliance change or resting period.  Outcome: Progressing     Problem: Risk for Elopement  Goal: Patient will not exit the unit/facility without proper excort  Outcome: Progressing     Problem: Chronic Conditions and Co-morbidities  Goal: Patient's chronic conditions and co-morbidity symptoms are monitored and maintained or improved  Outcome: Progressing     
  Problem: Discharge Planning  Goal: Discharge to home or other facility with appropriate resources  Outcome: Progressing  Flowsheets (Taken 8/7/2024 0432)  Discharge to home or other facility with appropriate resources:   Identify barriers to discharge with patient and caregiver   Arrange for needed discharge resources and transportation as appropriate     Problem: Pain  Goal: Verbalizes/displays adequate comfort level or baseline comfort level  Outcome: Progressing  Flowsheets (Taken 8/7/2024 0432)  Verbalizes/displays adequate comfort level or baseline comfort level:   Encourage patient to monitor pain and request assistance   Administer analgesics based on type and severity of pain and evaluate response   Assess pain using appropriate pain scale   Implement non-pharmacological measures as appropriate and evaluate response     Problem: Safety - Adult  Goal: Free from fall injury  Outcome: Progressing  Flowsheets (Taken 8/7/2024 0432)  Free From Fall Injury: Instruct family/caregiver on patient safety     Problem: ABCDS Injury Assessment  Goal: Absence of physical injury  Outcome: Progressing  Flowsheets (Taken 8/7/2024 0432)  Absence of Physical Injury: Implement safety measures based on patient assessment     Problem: Skin/Tissue Integrity  Goal: Absence of new skin breakdown  Description: 1.  Monitor for areas of redness and/or skin breakdown  2.  Assess vascular access sites hourly  3.  Every 4-6 hours minimum:  Change oxygen saturation probe site  4.  Every 4-6 hours:  If on nasal continuous positive airway pressure, respiratory therapy assess nares and determine need for appliance change or resting period.  Outcome: Progressing     Problem: Risk for Elopement  Goal: Patient will not exit the unit/facility without proper excort  Outcome: Progressing  Flowsheets (Taken 8/7/2024 0432)  Nursing Interventions for Elopement Risk:   Assist with personal care needs such as toileting, eating, dressing, as needed to 
address chronic and comorbid conditions and prevent exacerbation or deterioration   Update acute care plan with appropriate goals if chronic or comorbid symptoms are exacerbated and prevent overall improvement and discharge  Taken 8/7/2024 0845 by Danielle Noriega RN  Care Plan - Patient's Chronic Conditions and Co-Morbidity Symptoms are Monitored and Maintained or Improved: Monitor and assess patient's chronic conditions and comorbid symptoms for stability, deterioration, or improvement       Care plan reviewed with patient.  Patient verbalized understanding of the plan of care and contributed to goal setting.

## 2024-08-09 NOTE — DISCHARGE INSTR - MEDS
Warfarin Discharge Instructions:   Date Warfarin Dose   8/10/24 (tomorrow) 7.5 mg (1 and 1/2 tablet)   8/11/24 7.5 mg (1 and 1/2 tablet)   8/12/24 Contact coumadin clinic for further instructions      Provider dosing warfarin: [facility]  Next INR date: Contact [facility] ([phone]) on Monday 8/12/24 for further instructions and INR date

## 2024-08-09 NOTE — PROGRESS NOTES
Clinical Pharmacy Note                                               Warfarin Discharge Recommendations    Patient discharged from [facility] today on warfarin.    Warfarin indication: Atrial fibrillation or Atrial flutter  INR goal during admission: 2.0-3.0  Previous home warfarin regimen: 2.5mg MF, 5mg all other days (unable to verify this for sure with [facility] coumadin clinic, but was dose two weeks ago on previous admisson)  Interacting medications at discharge: Amiodarone  Coumadin 5 mg tabs    Hospital Warfarin Doses & INR Results  (Has been on hold while inpatient)  **Give 5mg Warfarin prior to discharge**    Recent INRs:  Recent Labs     08/07/24 2040   INR 1.00     Warfarin Discharge Instructions:   Date Warfarin Dose   8/10/24 (tomorrow) 7.5 mg (1 and 1/2 tablet)   8/11/24 7.5 mg (1 and 1/2 tablet)   8/12/24 Contact coumadin clinic for further instructions     Provider dosing warfarin: [facility]  Next INR date: Contact [facility] ([phone]) on Monday 8/12/24 for further instructions and INR date

## 2024-08-09 NOTE — PROGRESS NOTES
[facility]   Nephrology progress note  8/9/2024, 11:00 AM      Pt Name:    Garrett Duncan  MRN:     [id number]     YOB: 1965  Admit Date:    8/6/2024  7:35 AM    Chief Complaint: Nephrology following for BABAK/CKD and possibly fluid overload.    Subjective:  Patient seen and examined  No chest pain no significant shortness of breath  Clinically looks very well    Objective:  24HR INTAKE/OUTPUT:    Intake/Output Summary (Last 24 hours) at 8/9/2024 1100  Last data filed at 8/8/2024 1611  Gross per 24 hour   Intake --   Output 450 ml   Net -450 ml        Admission weight: 127 kg (280 lb)  Wt Readings from Last 3 Encounters:   08/07/24 118.2 kg (260 lb 9.3 oz)   07/26/24 125.3 kg (276 lb 3.2 oz)   07/18/24 124.7 kg (275 lb)        Vitals :   Vitals:    08/09/24 0114 08/09/24 0445 08/09/24 0532 08/09/24 0829   BP:  (!) 155/81  (!) 155/70   Pulse:  57  57   Resp: 17 16 16 18   Temp:  98.2 °F (36.8 °C)  97.9 °F (36.6 °C)   TempSrc:  Oral  Oral   SpO2:  95%  97%   Weight:       Height:           Physical examination  General Appearance:  Well developed. No distress  Mouth/Throat:  Oral mucosa moist  Neck:  Supple, no JVD  Lungs:  Breath sounds: clear  Heart::  S1,S2 heard  Abdomen:  Soft, non - tender  Musculoskeletal:  Edema -noted bilaterally much improved    Medications:  Infusion:    sodium chloride      dextrose       Meds:    sacubitril-valsartan  1 tablet Oral BID    bumetanide  1 mg Oral Daily    thiamine  100 mg Oral Daily    multivitamin  1 tablet Oral Daily    metoprolol succinate  25 mg Oral Daily    isosorbide mononitrate  30 mg Oral Daily    sodium chloride flush  5-40 mL IntraVENous 2 times per day    [Held by provider] ALPRAZolam  0.5 mg Oral Nightly    amiodarone  200 mg Oral Daily    [Held by provider] thiamine  100 mg Oral Daily    folic acid  1 mg Oral Daily    [Held by provider] ticagrelor  90 mg Oral BID    [Held by provider] hydrALAZINE  100 mg Oral TID    [Held by

## 2024-08-09 NOTE — PROGRESS NOTES
[facility]  PHYSICAL THERAPY MISSED TREATMENT NOTE  [facility] MED SURG 8AB    Date: 2024  Patient Name: Garrett Duncan        MRN: [id number]   : 1965  (59 y.o.)  Gender: male   Referring Practitioner: Garrett Naik MD  Diagnosis: Lower back pain         REASON FOR MISSED TREATMENT:  Missed Treat.      ALETA Harper approved therapy session. Patient seated EOB upon PTA arrival. Patient refusal for therapy session this date. He states he already had OT and did not feel like participating in PT treatment at this time. Education provided on benefits of PT. Patient continue to decline at this time. Continue POC next available date.

## 2024-08-10 DIAGNOSIS — F41.3 OTHER MIXED ANXIETY DISORDERS: Primary | ICD-10-CM

## 2024-08-12 ENCOUNTER — TELEPHONE (OUTPATIENT)
Dept: FAMILY MEDICINE CLINIC | Age: 59
End: 2024-08-12

## 2024-08-12 ENCOUNTER — HOSPITAL ENCOUNTER (OUTPATIENT)
Dept: PULMONOLOGY | Age: 59
Discharge: HOME | End: 2024-08-12
Attending: INTERNAL MEDICINE
Payer: COMMERCIAL

## 2024-08-12 ENCOUNTER — CARE COORDINATION (OUTPATIENT)
Dept: CARE COORDINATION | Age: 59
End: 2024-08-12

## 2024-08-12 DIAGNOSIS — Z79.899 ON AMIODARONE THERAPY: ICD-10-CM

## 2024-08-12 DIAGNOSIS — I48.0 PAROXYSMAL ATRIAL FIBRILLATION (HCC): ICD-10-CM

## 2024-08-12 PROCEDURE — 94726 PLETHYSMOGRAPHY LUNG VOLUMES: CPT

## 2024-08-12 PROCEDURE — 94010 BREATHING CAPACITY TEST: CPT

## 2024-08-12 PROCEDURE — 94729 DIFFUSING CAPACITY: CPT

## 2024-08-12 RX ORDER — ALPRAZOLAM 0.5 MG/1
TABLET ORAL
Qty: 30 TABLET | Refills: 5 | Status: SHIPPED | OUTPATIENT
Start: 2024-08-12 | End: 2025-02-08

## 2024-08-12 NOTE — TELEPHONE ENCOUNTER
Spoke with patient earlier about time off work. He was unsure who had him off work.  Patient stated he will call susan and see who filled out the paperwork for him.     Patient called and stated that he spoke to susan and he is covered by [employer] until Sept 2nd.       He is scheduled for Wednesday appointment. He wanted to know if you wanted to go off his labs from in the hospital or would you like him to have another lab completed for his hemoglobin before his appointment?

## 2024-08-12 NOTE — TELEPHONE ENCOUNTER
Care Transitions Initial Follow Up Call    Outreach made within 2 business days of discharge: Yes    Patient: Garrett Duncan Patient : 1965   MRN: [id number]  Reason for Admission: Lower back pain  Discharge Date: 24       Spoke with: Patient    Discharge department/facility: [facility]    TCM Interactive Patient Contact:  Was patient able to fill all prescriptions: Yes  Was patient instructed to bring all medications to the follow-up visit: Yes  Is patient taking all medications as directed in the discharge summary? Yes  Does patient understand their discharge instructions: Yes  Does patient have questions or concerns that need addressed prior to 7-14 day follow up office visit: no    Additional needs identified to be addressed with provider  No needs identified             Scheduled appointment with PCP within 7-14 days    Follow Up  Future Appointments   Date Time Provider Department Center   2024  3:30 PM [facility] PULMONARY FUNCTION ROOM 1 [facility]   2024  2:00 PM [facility]onel Ad[facility], APRN - CNP [facility]   2024  3:30 PM Sony Galvez PA-C [facility] [place]   2025  1:45 PM Fany Guzmán MD [facility]       Queta Steven CMA ([facility])      Breath sounds clear and equal bilaterally.

## 2024-08-12 NOTE — TELEPHONE ENCOUNTER
Called patient and left a message informing that he doesn't need any additional labs we will see him at his scheduled appointment.

## 2024-08-13 ENCOUNTER — CARE COORDINATION (OUTPATIENT)
Dept: CARE COORDINATION | Age: 59
End: 2024-08-13

## 2024-08-13 NOTE — CARE COORDINATION
Up:   Plan for next ACM outreach in approximately 1 week to complete:  - CC Protocol assessments  - disease specific assessments  - goal progression  - education .   Patient  is agreeable to this plan.

## 2024-08-14 ENCOUNTER — OFFICE VISIT (OUTPATIENT)
Dept: FAMILY MEDICINE CLINIC | Age: 59
End: 2024-08-14

## 2024-08-14 VITALS
DIASTOLIC BLOOD PRESSURE: 76 MMHG | RESPIRATION RATE: 16 BRPM | BODY MASS INDEX: 33.21 KG/M2 | WEIGHT: 251.7 LBS | SYSTOLIC BLOOD PRESSURE: 132 MMHG | HEART RATE: 72 BPM

## 2024-08-14 DIAGNOSIS — J81.1 CHRONIC PULMONARY EDEMA: ICD-10-CM

## 2024-08-14 DIAGNOSIS — Z09 HOSPITAL DISCHARGE FOLLOW-UP: Primary | ICD-10-CM

## 2024-08-14 DIAGNOSIS — M48.061 FORAMINAL STENOSIS OF LUMBAR REGION: ICD-10-CM

## 2024-08-14 DIAGNOSIS — N18.4 CKD (CHRONIC KIDNEY DISEASE) STAGE 4, GFR 15-29 ML/MIN (HCC): ICD-10-CM

## 2024-08-14 DIAGNOSIS — J96.01 ACUTE HYPOXEMIC RESPIRATORY FAILURE (HCC): ICD-10-CM

## 2024-08-14 DIAGNOSIS — I50.23 ACUTE ON CHRONIC SYSTOLIC CHF (CONGESTIVE HEART FAILURE) (HCC): ICD-10-CM

## 2024-08-14 DIAGNOSIS — D63.8 ANEMIA, CHRONIC DISEASE: ICD-10-CM

## 2024-08-14 DIAGNOSIS — M54.42 CHRONIC BILATERAL LOW BACK PAIN WITH BILATERAL SCIATICA: ICD-10-CM

## 2024-08-14 DIAGNOSIS — I25.810 CORONARY ARTERY DISEASE INVOLVING CORONARY BYPASS GRAFT OF NATIVE HEART WITHOUT ANGINA PECTORIS: ICD-10-CM

## 2024-08-14 DIAGNOSIS — I42.0 DILATED CARDIOMYOPATHY (HCC): ICD-10-CM

## 2024-08-14 DIAGNOSIS — M48.062 LUMBAR STENOSIS WITH NEUROGENIC CLAUDICATION: ICD-10-CM

## 2024-08-14 DIAGNOSIS — G89.29 CHRONIC BILATERAL LOW BACK PAIN WITH BILATERAL SCIATICA: ICD-10-CM

## 2024-08-14 DIAGNOSIS — M54.41 CHRONIC BILATERAL LOW BACK PAIN WITH BILATERAL SCIATICA: ICD-10-CM

## 2024-08-14 DIAGNOSIS — Z79.01 ANTICOAGULATED ON COUMADIN: ICD-10-CM

## 2024-08-14 RX ORDER — BUMETANIDE 1 MG/1
1 TABLET ORAL DAILY PRN
Qty: 30 TABLET | Refills: 0 | Status: SHIPPED | OUTPATIENT
Start: 2024-08-14

## 2024-08-14 RX ORDER — OXYCODONE HYDROCHLORIDE AND ACETAMINOPHEN 5; 325 MG/1; MG/1
1 TABLET ORAL EVERY 6 HOURS PRN
Qty: 28 TABLET | Refills: 0 | Status: SHIPPED | OUTPATIENT
Start: 2024-08-14 | End: 2024-08-21

## 2024-08-14 RX ORDER — BUMETANIDE 2 MG/1
2 TABLET ORAL DAILY
Qty: 30 TABLET | Refills: 0 | Status: SHIPPED | OUTPATIENT
Start: 2024-08-14

## 2024-08-14 RX ORDER — BUMETANIDE 1 MG/1
TABLET ORAL
Qty: 90 TABLET | OUTPATIENT
Start: 2024-08-14

## 2024-08-14 ASSESSMENT — ENCOUNTER SYMPTOMS
SHORTNESS OF BREATH: 0
ABDOMINAL PAIN: 0
NAUSEA: 0
BACK PAIN: 1
COUGH: 0

## 2024-08-14 NOTE — PROGRESS NOTES
succinate (TOPROL XL) 25 MG extended release tablet Take 1 tablet by mouth daily 30 tablet 0    Multiple Vitamin (MULTIVITAMIN) TABS tablet Take 1 tablet by mouth daily 90 tablet 0    isosorbide mononitrate (IMDUR) 30 MG extended release tablet Take 1 tablet by mouth daily 30 tablet 0    sennosides-docusate sodium (SENOKOT-S) 8.6-50 MG tablet Take 1 tablet by mouth daily Take an additional dose as needed for constipation 30 tablet 0    dicyclomine (BENTYL) 10 MG capsule Take 1 capsule by mouth 3 times daily (with meals)      metOLazone (ZAROXOLYN) 2.5 MG tablet Take 1 tablet by mouth daily as needed (increased swelling, shortness of breath, or weight gain > 3lbs in one day.) 30 tablet 0    dapagliflozin (FARXIGA) 5 MG tablet Take 1 tablet by mouth every morning 90 tablet 3    ferrous sulfate (IRON 325) 325 (65 Fe) MG tablet Take 1 tablet by mouth 2 times daily (with meals) 30 tablet 3    atorvastatin (LIPITOR) 40 MG tablet Take 1 tablet by mouth nightly 90 tablet 3    BRILINTA 90 MG TABS tablet TAKE 1 TABLET BY MOUTH TWICE DAILY 180 tablet 3    thiamine 100 MG tablet Take 1 tablet by mouth daily 90 tablet 3    folic acid (FOLVITE) 1 MG tablet Take 1 tablet by mouth daily 90 tablet 3    amiodarone (CORDARONE) 200 MG tablet Take 1 tablet by mouth daily 90 tablet 3    linagliptin (TRADJENTA) 5 MG tablet TAKE 1 TABLET BY MOUTH DAILY 90 tablet 3    insulin glargine (LANTUS SOLOSTAR) 100 UNIT/ML injection pen Inject 5 Units into the skin nightly as needed (hyperglycemia-BG >140)          Medications patient taking as of now reconciled against medications ordered at time of hospital discharge: Yes    Review of Systems   Constitutional:  Negative for chills and fever.   HENT: Negative.     Respiratory:  Negative for cough and shortness of breath.    Cardiovascular:  Positive for leg swelling. Negative for chest pain.   Gastrointestinal:  Negative for abdominal pain and nausea.   Musculoskeletal:  Positive for back pain and

## 2024-08-15 ENCOUNTER — TELEPHONE (OUTPATIENT)
Dept: FAMILY MEDICINE CLINIC | Age: 59
End: 2024-08-15

## 2024-08-15 NOTE — TELEPHONE ENCOUNTER
Received a call from coumadin clinic at [facility] and patient was in their office today and mentioned he is to have back surgery. He stated that he is scheduled for 8/20/2024 but patient mentioned that his surgery last time was cancelled because of his hemoglobin.   He also mention that he was to hold his coumadin and she was just check if that was what was needed for his hemoglobin or because upcoming surgery.     She was starting with our office and if we didn't tell patient to hold coumadin then she will call out to [facility].   Also she didn't know if patient was cleared from PCP stand point because of his hemoglobin.     She stated that patient INR was 1.8 today.   We can call her back at [phone]

## 2024-08-15 NOTE — TELEPHONE ENCOUNTER
Called coumadin clinic and informed. Spoke with Hannah and she verbalized understanding. She is going to reach out to [facility] regarding surgery

## 2024-08-15 NOTE — TELEPHONE ENCOUNTER
Would contact [facility] - I am confident that surgery is not schedules yet as there is nothing in EPIC of anything scheduled at [facility] which is where it will be due to his high risk.   I did not tell him to hold the coumadin.

## 2024-08-15 NOTE — TELEPHONE ENCOUNTER
Called and spoke with Hannah at coumadin clinic with [facility] [phone] and informed. She verbalized understanding. She will reach out to [facility].     She stated that if patient is scheduled for surgery here in the near future would you approve for him to hold coumadin 5 days prior and 5 days after bridging with Lovenox?

## 2024-08-16 NOTE — DISCHARGE SUMMARY
Hospitalist Discharge Summary    Patient: Garrett Duncan  YOB: 1965  MRN: [id number]   Acct: [id number]    Primary Care Physician: Leonel Adorno, ISABELLA - CNP    Admit date  8/6/2024    Discharge date: 8/9/2024     Chief Complaint on presentation: back pain    Initial H&P and Hospital course:  Mr. Duncan is a 60 yo male with pmhx of CKD4, HFrEF, pAfib, and T2DM presented for persistent low back pain. He has known pathology of L1-5 with surgery outpatient postponed secondary to anemia. Patient was given dilaudid, hydroxyzine, and alprazolam in ED. As patient was going to sleep, he began to desaturate and was placed on NC, progressed to high flow. Over the course of the day he was able to be weaned back to NC. He was given bentyl, lidocaine patch, and percocet for pain. Orthopedic surgery was consulted with plan for surgery in 1 week pending medical clearance.     Patient was admitted to hospitalist service for Acute on chronic lower back pain 2/2 L1-5 degenerative disc disease with stenosis and claudication. H/o DDD with previous interventions, redemonstrated on 6/20/24 CT lumbar. OrthoSpine was consulted, pt follows with Dr. Waller who is currently out of the hospital. I discussed with patient and OrthoSpine PA. Gave pt the option to stay in the hospital and consult the OrthoSpine physician currently on call, or go home and follow up in the office. There is a possibility Dr. Waller can get him in for surgery on August 20th. Patient advised to follow up in the office prior to this for continued pain control, discuss surgery, and discuss when to hold blood thinners. Patient agreed with plan.     During hospital stay, pt also noted to have acute on chronic HFrEF, acute hypoxic resp failure, paroxysmal nocturnal dyspnea, and elevated troponin d/t demand ischemia. Suspect CHF exacerbation 2/2 noncompliance. Last echo 7/23/24 EF 25%, LV dilation, severe global hypokinesis, severely dilated LA. GDMT:

## 2024-08-16 NOTE — ADT AUTH CERT
Comments  CommentPatient Demographics    Name Patient ID SSN Gender Identity Birth Date   Alessio Sarbaia [id number]  Male [birth year] (59 yrs)     Address Phone Email Employer     [place] [id number] [phone] (M)  [phone] (H) -- [place]  [address] [id number] [phone]      [facility] Race Occupation Emp Status    [employer] (non-) -- Full Time      Reg Status PCP Date Last Verified Next Review Date    Verified Leonel Adorno, APRN - CNP  [phone] 24      Admission Date Discharge Date Admitting Provider     24 Radha Villa PA-C       Marital Status Christianity       Anglican        Emergency Contact 1 Emergency Contact 2 Emergency Contact 3   Lou Sarabia (C)  [place]  [phone] (H) Jesica (Poa) Perla (C)  [facility]  [phone] (H)  [phone] (M) Harmeet Soares (C)  [phone] (M)     Subscriber Details  Hospital Account #[id number]  CVG Subscriber Name/Sex/Relation Subscriber  Subscriber Address/Phone Subscriber Emp/Emp Phone   1. [facility]  [id number] ALESSIO SARABIA - Male  (Self) 1965 [place] [address]  [phone](H) [employer]  [phone]     Utilization Reviews       Back Pain Clinical Indications for Admission to Inpatient Care by Nafisa Terrazas RN   Last updated by Nafisa Terrazas RN on 2024 1422     Review Status Created By   Primary Completed Nafisa Terrazas RN       Review Type   Admission      Criteria Review   Back Pain Clinical Indications for Admission to Inpatient Care     Overall Determination: Indications Met     Criteria:  [×] Admission is indicated for  1 or more  of the following  (1) (2) (3) (4) (5) (6) (7):      [×] Severe pain requiring acute inpatient management          2024  2:22 PM              -- 2024  2:22 PM by Nafisa Terrazas RN --                                    (X) Severe pain requiring acute inpatient management, as indicated by  1 or

## 2024-08-19 ENCOUNTER — PROCEDURE VISIT (OUTPATIENT)
Dept: CARDIOLOGY CLINIC | Age: 59
End: 2024-08-19
Payer: COMMERCIAL

## 2024-08-19 DIAGNOSIS — I50.20 HFREF (HEART FAILURE WITH REDUCED EJECTION FRACTION) (HCC): Primary | ICD-10-CM

## 2024-08-19 PROCEDURE — 93297 REM INTERROG DEV EVAL ICPMS: CPT | Performed by: NUCLEAR MEDICINE

## 2024-08-20 ENCOUNTER — CARE COORDINATION (OUTPATIENT)
Dept: CARE COORDINATION | Age: 59
End: 2024-08-20

## 2024-08-20 NOTE — CARE COORDINATION
Ambulatory Care Coordination Note     2024 12:19 PM     Patient Current Location:  Home:  [facility] [address]     ACM contacted the patient by telephone. Verified name and  with patient as identifiers.         ACM: Kylah Ramesh RN     Challenges to be reviewed by the provider   Additional needs identified to be addressed with provider No  none               Method of communication with provider: none.    Care Summary Note: Garrett was referred to care coordination for education and assistance in managing his chronic conditions. Pt has h/o: HLD, HTN, a-fib, alcohol abuse, CHF, back pain.   Spoke with Garrett today for f/u.   Pt reports that other than the back pain he is feeling better.    Back pain-following with [facility].  Waiting for call from office as pt does not have f/u and isn't sure when surgery is going to get rescheduled. Taking percocet for pain.  Pt aware that he needs to go through [facility] if needing addtl pain meds once he completes current script.   Taking laxative/stool softener PRN  CHF: pt is monitoring daily wts.  Has appt with CHF clinic next mth.  Has cardio f/u tomorrow.  Following low sodium diet. Reports wts have been stable at 250-252#.  Denies edema to ext's today.  Taking Bumex 2mg daily and is taking 1mg additionally if having wt gain or increased edema.   Follows with [facility] coumadin clinic.  Had recent visit and has dosing schedule that he is following.    Pt obtained pulse ox. Reports SpO2 reading has been 97-98%RA and HR has been in the 60's.    Has had a little more energy.   Pt is wanting to return to work if surgery is not going to take place soon.  Advised to discuss with [facility].   Pt has not drank any alcohol since hospital d/c.        Offered patient enrollment in the Remote Patient Monitoring (RPM) program for in-home monitoring: Yes, but did not enroll at this time: already monitoring with home equipment.     Assessments Completed:   Diabetes Assessment    Medic Alert ID:

## 2024-08-21 ENCOUNTER — CARE COORDINATION (OUTPATIENT)
Dept: CARE COORDINATION | Age: 59
End: 2024-08-21

## 2024-08-21 ENCOUNTER — OFFICE VISIT (OUTPATIENT)
Dept: CARDIOLOGY CLINIC | Age: 59
End: 2024-08-21
Payer: COMMERCIAL

## 2024-08-21 VITALS
DIASTOLIC BLOOD PRESSURE: 72 MMHG | HEART RATE: 72 BPM | SYSTOLIC BLOOD PRESSURE: 124 MMHG | HEIGHT: 73 IN | BODY MASS INDEX: 32.97 KG/M2 | WEIGHT: 248.8 LBS

## 2024-08-21 DIAGNOSIS — I10 ESSENTIAL HYPERTENSION: ICD-10-CM

## 2024-08-21 DIAGNOSIS — Z95.1 S/P CABG (CORONARY ARTERY BYPASS GRAFT): ICD-10-CM

## 2024-08-21 DIAGNOSIS — Z95.820 S/P ANGIOPLASTY WITH STENT: ICD-10-CM

## 2024-08-21 DIAGNOSIS — Z95.810 PRESENCE OF COMBINATION INTERNAL CARDIAC DEFIBRILLATOR (ICD) AND PACEMAKER: ICD-10-CM

## 2024-08-21 DIAGNOSIS — I50.20 HFREF (HEART FAILURE WITH REDUCED EJECTION FRACTION) (HCC): Primary | ICD-10-CM

## 2024-08-21 PROCEDURE — 3074F SYST BP LT 130 MM HG: CPT | Performed by: PHYSICIAN ASSISTANT

## 2024-08-21 PROCEDURE — 99214 OFFICE O/P EST MOD 30 MIN: CPT | Performed by: PHYSICIAN ASSISTANT

## 2024-08-21 PROCEDURE — 3078F DIAST BP <80 MM HG: CPT | Performed by: PHYSICIAN ASSISTANT

## 2024-08-21 NOTE — CARE COORDINATION
Pt called today reporting that he has still not heard from [facility] regarding his back.  Pt has left a message with the office.    I spoke with TOMAS Islas at [facility].  She states that she has received pt's messages and plans on discussing with Dr. Rae/assistant today regarding plan of action and she will reach back  out to pt.    Contacted pt. Provided update.  Encouraged to call back with any further concerns.

## 2024-08-21 NOTE — PROGRESS NOTES
times daily (with meals) 30 tablet 3    atorvastatin (LIPITOR) 40 MG tablet Take 1 tablet by mouth nightly 90 tablet 3    BRILINTA 90 MG TABS tablet TAKE 1 TABLET BY MOUTH TWICE DAILY 180 tablet 3    thiamine 100 MG tablet Take 1 tablet by mouth daily 90 tablet 3    folic acid (FOLVITE) 1 MG tablet Take 1 tablet by mouth daily 90 tablet 3    amiodarone (CORDARONE) 200 MG tablet Take 1 tablet by mouth daily 90 tablet 3    linagliptin (TRADJENTA) 5 MG tablet TAKE 1 TABLET BY MOUTH DAILY 90 tablet 3    warfarin (COUMADIN) 5 MG tablet Take 1 tablet by mouth daily (Patient taking differently: 2.5 mg on Monday and , 5 mg all other days  Managed at [facility]) 30 tablet 1    insulin glargine (LANTUS SOLOSTAR) 100 UNIT/ML injection pen Inject 5 Units into the skin nightly as needed (hyperglycemia-BG >140)       No current facility-administered medications for this visit.       Social History     Socioeconomic History    Marital status:     Number of children: 3    Years of education: 12    Highest education level: High school graduate   Occupational History     Employer: [employer]   Tobacco Use    Smoking status: Former     Current packs/day: 0.00     Average packs/day: 0.5 packs/day for 44.0 years (22.0 ttl pk-yrs)     Types: Cigarettes     Start date: 1980     Quit date: 2024     Years since quittin.0     Passive exposure: Never    Smokeless tobacco: Current     Types: Snuff     Last attempt to quit: 3/31/2023   Vaping Use    Vaping status: Never Used   Substance and Sexual Activity    Alcohol use: Yes     Alcohol/week: 10.0 standard drinks of alcohol     Types: 10 Shots of liquor per week     Comment: a pint or more of liquor a day pt states stopped drinking more than 1 year ago    Drug use: Not Currently     Comment: past john of cocaine    Sexual activity: Not Currently     Partners: Female     Social Determinants of Health     Financial Resource Strain: Low Risk  (2024)    Overall

## 2024-08-21 NOTE — PATIENT INSTRUCTIONS
Your Provider for Today: Sony Galvez PA-C  Your nurses for today: [place]     You may receive a survey regarding the care you received during your visit.  Your input is valuable to us.  We encourage you to complete and return your survey.  We hope you will choose us in the future for your healthcare needs.

## 2024-08-22 NOTE — CARE COORDINATION
Problem: At Risk for Falls  Goal: Patient does not fall  Outcome: Monitoring/Evaluating progress     Problem: Fluid Volume Excess  Goal: Fluid Volume Excess Symptoms Resolved  Description: Treatment often consists of oxygen and respiratory support with diuretic therapy at doses that exceed usual dose (typically doubled).  Monitor patient response to treatment.    Acute dyspnea should resolve quickly if dose is adequate and kidney function is adequate. Dyspnea/SOB should only be observed with Activity after effective treatment. Patient should be able to lie down comfortably, without SOB.  Outcome: Monitoring/Evaluating progress      8/9/24, 3:00 PM EDT    Patient goals/plan/ treatment preferences discussed by  and .  Patient goals/plan/ treatment preferences reviewed with patient/ family.  Patient/ family verbalize understanding of discharge plan and are in agreement with goal/plan/treatment preferences.  Understanding was demonstrated using the teach back method.  AVS provided by RN at time of discharge, which includes all necessary medical information pertaining to the patients current course of illness, treatment, post-discharge goals of care, and treatment preferences.     Services At/After Discharge: None (no new)    Returning home alone with CHF clinic, DME as PTA, and [facility] coumadin clinic. Will return for scheduled surgery anticipated 8/20.

## 2024-08-23 ENCOUNTER — TELEPHONE (OUTPATIENT)
Dept: FAMILY MEDICINE CLINIC | Age: 59
End: 2024-08-23

## 2024-08-23 NOTE — TELEPHONE ENCOUNTER
Patient requesting refill of Percocet to [facility] to last the weekend.  Unable to get a hold of [facility] for refill and do not want to go the weekend or ER for pain meds.  Please advise.  Will check with pharmacy after noon.

## 2024-08-23 NOTE — TELEPHONE ENCOUNTER
Patient notified of above and understanding voiced.  He did state he finally spoke with someone at [facility] and they are going to get him some Tramadol called in.

## 2024-08-26 ENCOUNTER — CARE COORDINATION (OUTPATIENT)
Dept: CARE COORDINATION | Age: 59
End: 2024-08-26

## 2024-08-26 NOTE — CARE COORDINATION
Ambulatory Care Coordination Note     8/26/2024 3:36 PM     Patient outreach attempt by this ACM today to perform care management follow up . ACM was unable to reach the patient by telephone today;  pt answered but unable to talk as he was just leaving his house.  Will attempt to f/u later this wk.      ACM: Kylah Ramesh RN     Care Summary Note:  Garrett was referred to care coordination for education and assistance in managing his chronic conditions. Pt has h/o: HLD, HTN, a-fib, alcohol abuse, CHF, back pain.     PCP/Specialist follow up:   Future Appointments         Provider Specialty Dept Phone    9/16/2024 1:00 PM Jen Nielsen APRN - CNP Cardiology [phone]    2/28/2025 1:45 PM Fany Guzmán MD Cardiology [phone]            Follow Up:   Plan for next AC outreach in approximately 1-2 days  to complete:  - CC Protocol assessments  - disease specific assessments  - goal progression  - education   - follow-up appointment with [facility] .

## 2024-08-27 ENCOUNTER — CARE COORDINATION (OUTPATIENT)
Dept: CARE COORDINATION | Age: 59
End: 2024-08-27

## 2024-08-27 NOTE — CARE COORDINATION
Ambulatory Care Coordination Note     8/27/2024 11:41 AM     ACM outreach attempt by this ACM today to perform care management follow up . ACM was unable to reach the patient by telephone today; left voice message requesting a return phone call to this ACM.     ACM: Kylah Ramesh RN     Care Summary Note:     PCP/Specialist follow up:   Future Appointments         Provider Specialty Dept Phone    9/16/2024 1:00 PM Jen Nielsen APRN - CNP Cardiology [phone]    2/28/2025 1:45 PM Fany Guzmán MD Cardiology [phone]            Follow Up:   Plan for next ACM outreach in approximately 1 week to complete:  - CC Protocol assessments  - disease specific assessments  - goal progression  - education .

## 2024-09-03 ENCOUNTER — CARE COORDINATION (OUTPATIENT)
Dept: CARE COORDINATION | Age: 59
End: 2024-09-03

## 2024-09-03 NOTE — CARE COORDINATION
Ambulatory Care Coordination Note     9/3/2024 1:51 PM     ACM outreach attempt by this ACM today to perform care management follow up . ACM was unable to reach the patient by telephone today; left voice message requesting a return phone call to this ACM.     ACM: Kylah Ramesh RN     Care Summary Note: Garrett was referred to care coordination for education and assistance in managing his chronic conditions. Pt has h/o: HLD, HTN, a-fib, alcohol abuse, CHF, back pain.        PCP/Specialist follow up:   Future Appointments         Provider Specialty Dept Phone    9/16/2024 1:00 PM Jen Nielsen APRN - CNP Cardiology [phone]    2/28/2025 1:45 PM Fany Guzmán MD Cardiology [phone]            Follow Up:   Plan for next ACM outreach in approximately 1 week to complete:  - CC Protocol assessments  - disease specific assessments  - goal progression  - education .

## 2024-09-04 RX ORDER — METOPROLOL SUCCINATE 25 MG/1
25 TABLET, EXTENDED RELEASE ORAL DAILY
Qty: 90 TABLET | Refills: 1 | Status: SHIPPED | OUTPATIENT
Start: 2024-09-04

## 2024-09-04 RX ORDER — ISOSORBIDE MONONITRATE 30 MG/1
30 TABLET, EXTENDED RELEASE ORAL DAILY
Qty: 90 TABLET | Refills: 1 | Status: SHIPPED | OUTPATIENT
Start: 2024-09-04

## 2024-09-04 RX ORDER — FERROUS SULFATE 325(65) MG
325 TABLET ORAL 2 TIMES DAILY WITH MEALS
Qty: 180 TABLET | Refills: 3 | Status: SHIPPED | OUTPATIENT
Start: 2024-09-04

## 2024-09-04 NOTE — TELEPHONE ENCOUNTER
Patient was last seen 8/21/2024 and his next appt is 2/28/2025.  He is calling in requesting refills of his metroprolol succinate 25 mg once daily and imdur 30 mg once daily sent to [facility] on [address], both for 90 day supplies with refills if possible?  Please advise.

## 2024-09-04 NOTE — TELEPHONE ENCOUNTER
Patient requesting 90 day Rx for ferrous sulfate to [address]  Will check with pharmacy after 4pm.  Please refill if appropriate

## 2024-09-11 ENCOUNTER — CARE COORDINATION (OUTPATIENT)
Dept: CARE COORDINATION | Age: 59
End: 2024-09-11

## 2024-09-11 ENCOUNTER — LAB (OUTPATIENT)
Dept: LAB | Age: 59
End: 2024-09-11

## 2024-09-11 DIAGNOSIS — D64.9 NORMOCYTIC ANEMIA: ICD-10-CM

## 2024-09-11 DIAGNOSIS — D64.9 NORMOCYTIC ANEMIA: Primary | ICD-10-CM

## 2024-09-11 LAB
HCT VFR BLD AUTO: 33.6 % (ref 42–52)
HGB BLD-MCNC: 10.6 GM/DL (ref 14–18)

## 2024-09-12 RX ORDER — DICYCLOMINE HYDROCHLORIDE 10 MG/1
10 CAPSULE ORAL
Qty: 120 CAPSULE | Refills: 2 | Status: SHIPPED | OUTPATIENT
Start: 2024-09-12

## 2024-09-13 RX ORDER — DICYCLOMINE HYDROCHLORIDE 10 MG/1
CAPSULE ORAL
Qty: 270 CAPSULE | OUTPATIENT
Start: 2024-09-13

## 2024-09-14 DIAGNOSIS — E11.22 CONTROLLED TYPE 2 DIABETES MELLITUS WITH CHRONIC KIDNEY DISEASE, WITHOUT LONG-TERM CURRENT USE OF INSULIN, UNSPECIFIED CKD STAGE (HCC): ICD-10-CM

## 2024-09-23 PROCEDURE — 93297 REM INTERROG DEV EVAL ICPMS: CPT | Performed by: NUCLEAR MEDICINE

## 2024-09-26 ENCOUNTER — CARE COORDINATION (OUTPATIENT)
Dept: CARE COORDINATION | Age: 59
End: 2024-09-26

## 2024-10-05 NOTE — CARE COORDINATION
Care Transitions Note    Follow Up Call      Attempted to reach patient for transitions of care follow up.  Unable to reach patient.      Outreach Attempts:   HIPAA compliant voicemail left for patient.     Care Summary Note: 1st attempt to contact pt for care transition follow up.  Unable to reach pt.  Left message with contact information and request for call back.    Follow Up Appointment:   Future Appointments         Provider Specialty Dept Phone    5/14/2024 3:45 PM Anika Soriano, PT Physical Therapy [phone]    5/16/2024 3:30 PM Mortimer, Connor, PA-C Cardiology [phone]    7/29/2024 3:00 PM Samuel Kathleen MD Nephrology [phone]    8/12/2024 3:30 PM [facility] PULMONARY FUNCTION ROOM 1 Pulmonary Function Testing [phone]    8/20/2024 3:30 PM Leonel Adorno, APRN - CNP Family Medicine [phone]    8/21/2024 3:30 PM Shelly Johnson MD Cardiology [phone]    2/28/2025 1:45 PM Fany Guzmán MD Cardiology [phone]            Plan for follow-up call in 2-5 days based on severity of symptoms and risk factors. Plan for next call:  symptom management-back pain/leg pain, CP, SOB, swelling, weight gain, any new or worsening symptoms, any new or changed meds, review [facility] appt      Jennifer Cevallos RN         complains of pain/discomfort

## 2024-10-11 ENCOUNTER — CARE COORDINATION (OUTPATIENT)
Dept: CARE COORDINATION | Age: 59
End: 2024-10-11

## 2024-10-11 NOTE — CARE COORDINATION
Ambulatory Care Coordination Note     10/11/2024 2:03 PM     Patient Current Location:  [place]     Patient contacted the ACM by telephone. Verified name and  with patient as identifiers.         ACM: Kylah Ramesh RN     Challenges to be reviewed by the provider   Additional needs identified to be addressed with provider No  none               Method of communication with provider: none.    Has the patient been seen in the ED since your last call? no    Care Summary Note: Garrett was referred to care coordination for education and assistance in managing his chronic conditions. Pt has h/o: HLD, HTN, a-fib, alcohol abuse, CHF, DM, back pain.   Spoke with Garrett today for f/u.   Pt continues to work.    Had epidural on 10/1 for back.  Pt reports that it has helped some but still having pain in back and down legs.  Now following with pain mgmt at [facility].  Has f/u 10/22 pt is going to discuss further pain mgmt tx until they can reschedule his back surgery.   CHF: has upcoming appt on 10/23.  Reminded pt of date and time and encouraged to go.  Pt is monitoring wts at home.  Taking Bumex routinely.  Has not had to take any Zaroxolyn recently.  Denies edema, SOB, or bloating.   DM: monitoring BS's.  Taking Lantus and Tradjenta as prescribed.   Following with [facility] coumadin clinic  Reviewed s/s of low hgb. Encouraged to call should he start having increased SOB, fatigue, pale skin color  Denies new barriers to care.   Pt continues to work full time.     Offered patient enrollment in the Remote Patient Monitoring (RPM) program for in-home monitoring: Yes, but did not enroll at this time: pt prefers to self monitor .     Assessments Completed:   Diabetes Assessment    Medic Alert ID: No  How often do you test your blood sugar?: Daily   Do you have barriers with adherence to non-pharmacologic self-management interventions? (Nutrition/Exercise/Self-Monitoring): No   Have you ever had to go to the ED for symptoms of low blood sugar?:

## 2024-10-11 NOTE — CARE COORDINATION
Ambulatory Care Coordination Note     10/11/2024 1:21 PM     Patient outreach attempt by this ACM today to perform care management follow up . ACM was unable to reach the patient by telephone today; no answer.  Unable to leave message.      ACM: Kylah Ramesh RN     Care Summary Note: Garrett was referred to care coordination for education and assistance in managing his chronic conditions. Pt has h/o: HLD, HTN, a-fib, alcohol abuse, CHF, DM, back pain.     PCP/Specialist follow up:   Future Appointments         Provider Specialty Dept Phone    10/23/2024 1:00 PM Jen Nielsen APRN - CNP Cardiology [phone]    2/28/2025 1:45 PM Fany Guzmán MD Cardiology [phone]            Follow Up:   Plan for next ACM outreach in approximately 1 week to complete:  - CC Protocol assessments  - disease specific assessments  - goal progression  - education .

## 2024-10-21 ENCOUNTER — HOSPITAL ENCOUNTER (EMERGENCY)
Age: 59
Discharge: HOME | End: 2024-10-21
Payer: COMMERCIAL

## 2024-10-21 VITALS
HEIGHT: 74 IN | OXYGEN SATURATION: 99 % | HEART RATE: 63 BPM | BODY MASS INDEX: 32.08 KG/M2 | SYSTOLIC BLOOD PRESSURE: 166 MMHG | DIASTOLIC BLOOD PRESSURE: 81 MMHG | TEMPERATURE: 98.3 F | WEIGHT: 250 LBS | RESPIRATION RATE: 16 BRPM

## 2024-10-21 DIAGNOSIS — R09.89 SUSPECTED DVT (DEEP VEIN THROMBOSIS): Primary | ICD-10-CM

## 2024-10-21 LAB
ANION GAP SERPL CALC-SCNC: 13 MEQ/L (ref 8–16)
BASOPHILS ABSOLUTE: 0.1 THOU/MM3 (ref 0–0.1)
BASOPHILS NFR BLD AUTO: 0.8 %
BUN SERPL-MCNC: 75 MG/DL (ref 7–22)
CALCIUM SERPL-MCNC: 8.8 MG/DL (ref 8.5–10.5)
CHLORIDE SERPL-SCNC: 103 MEQ/L (ref 98–111)
CO2 SERPL-SCNC: 19 MEQ/L (ref 23–33)
CREAT SERPL-MCNC: 3.6 MG/DL (ref 0.4–1.2)
D DIMER PPP IA.FEU-MCNC: 591 NG/ML FEU (ref 0–500)
DEPRECATED RDW RBC AUTO: 61.5 FL (ref 35–45)
EOSINOPHIL NFR BLD AUTO: 4.7 %
EOSINOPHILS ABSOLUTE: 0.3 THOU/MM3 (ref 0–0.4)
ERYTHROCYTE [DISTWIDTH] IN BLOOD BY AUTOMATED COUNT: 17.1 % (ref 11.5–14.5)
GFR SERPL CREATININE-BSD FRML MDRD: 19 ML/MIN/1.73M2
GLUCOSE SERPL-MCNC: 90 MG/DL (ref 70–108)
HCT VFR BLD AUTO: 36.3 % (ref 42–52)
HGB BLD-MCNC: 11.7 GM/DL (ref 14–18)
IMM GRANULOCYTES # BLD AUTO: 0.02 THOU/MM3 (ref 0–0.07)
IMM GRANULOCYTES NFR BLD AUTO: 0.3 %
LYMPHOCYTES ABSOLUTE: 0.7 THOU/MM3 (ref 1–4.8)
LYMPHOCYTES NFR BLD AUTO: 11.3 %
MCH RBC QN AUTO: 31.8 PG (ref 26–33)
MCHC RBC AUTO-ENTMCNC: 32.2 GM/DL (ref 32.2–35.5)
MCV RBC AUTO: 98.6 FL (ref 80–94)
MONOCYTES ABSOLUTE: 0.6 THOU/MM3 (ref 0.4–1.3)
MONOCYTES NFR BLD AUTO: 8.9 %
NEUTROPHILS ABSOLUTE: 4.7 THOU/MM3 (ref 1.8–7.7)
NEUTROPHILS NFR BLD AUTO: 74 %
NRBC BLD AUTO-RTO: 0 /100 WBC
OSMOLALITY SERPL CALC.SUM OF ELEC: 291.9 MOSMOL/KG (ref 275–300)
PLATELET # BLD AUTO: 132 THOU/MM3 (ref 130–400)
PMV BLD AUTO: 10.6 FL (ref 9.4–12.4)
POTASSIUM SERPL-SCNC: 4.8 MEQ/L (ref 3.5–5.2)
RBC # BLD AUTO: 3.68 MILL/MM3 (ref 4.7–6.1)
SODIUM SERPL-SCNC: 135 MEQ/L (ref 135–145)
WBC # BLD AUTO: 6.4 THOU/MM3 (ref 4.8–10.8)

## 2024-10-21 PROCEDURE — 80048 BASIC METABOLIC PNL TOTAL CA: CPT

## 2024-10-21 PROCEDURE — 85025 COMPLETE CBC W/AUTO DIFF WBC: CPT

## 2024-10-21 PROCEDURE — 85379 FIBRIN DEGRADATION QUANT: CPT

## 2024-10-21 PROCEDURE — 36415 COLL VENOUS BLD VENIPUNCTURE: CPT

## 2024-10-21 PROCEDURE — 99283 EMERGENCY DEPT VISIT LOW MDM: CPT

## 2024-10-21 ASSESSMENT — PAIN SCALES - GENERAL: PAINLEVEL_OUTOF10: 8

## 2024-10-21 ASSESSMENT — PAIN DESCRIPTION - LOCATION: LOCATION: LEG

## 2024-10-21 ASSESSMENT — PAIN DESCRIPTION - PAIN TYPE: TYPE: ACUTE PAIN

## 2024-10-21 ASSESSMENT — PAIN DESCRIPTION - DESCRIPTORS: DESCRIPTORS: BURNING

## 2024-10-21 ASSESSMENT — PAIN DESCRIPTION - ONSET: ONSET: ON-GOING

## 2024-10-21 ASSESSMENT — PAIN - FUNCTIONAL ASSESSMENT: PAIN_FUNCTIONAL_ASSESSMENT: 0-10

## 2024-10-21 ASSESSMENT — PAIN DESCRIPTION - ORIENTATION: ORIENTATION: LOWER

## 2024-10-21 ASSESSMENT — PAIN DESCRIPTION - FREQUENCY: FREQUENCY: CONTINUOUS

## 2024-10-21 NOTE — ED PROVIDER NOTES
patient's baseline. [LK]   2021 D-Dimer, Quantitative(!):    D-Dimer, Quant 591.00(!)  Elevated D-dimer of 591 noted.  Set the patient up for a left lower extremity vascular duplex ultrasound for 10/22/24 1300 in the ER.  Sent vascular ultrasound tech Banner a PerfectServe message. [LK]   2023 Glomerular Filtration Rate, Estimated(!):    Est, Glom Filt Rate 19(!)  GFR noted at 19.  When compared to previous GFR's they hovered between 17-19.  This is suspected to be the patient's baseline. [LK]   2026 Notify the patient that he will follow-up tomorrow for the ultrasound to rule out a DVT.  Patient verbalized understanding.  CBC did not reveal elevated white blood cells.  No infection suspected from the lab results. [LK]      ED Course User Index  [LK] Gurwinder Young PA-C     Vitals Reviewed:    Vitals:    10/21/24 1701   BP: (!) 166/81   Pulse: 63   Resp: 16   Temp: 98.3 °F (36.8 °C)   TempSrc: Oral   SpO2: 99%   Weight: 113.4 kg (250 lb)   Height: 1.88 m (6' 2\")     Patient presents to the ED for left leg swelling and pain.  He reports that this has been going on for around a couple of weeks.  Patient did not contact his PCP regarding this issue.  Patient localizes pain on the left posterior distal leg just under the calves and above the ankles.  Pain is described as constant, burning, 8/10.  Patient states he had chills for the last few days but denies fever and vomiting.  Patient denies chest pain, difficulty breathing.  Patient has a history of diabetes and an unspecified heart problem in which she is currently on blood thinners.  Patient did not take anything over-the-counter to alleviate the pain.  He states pain worsens to touch.  He states the left lower extremity feels numb at times but is able to move his toes and feel sensation.  Physical exam revealed left lower extremity tender to palpation specifically in the posterior leg above the ankle and below the gastrocnemius/soleus.  No edema to bilateral lower

## 2024-10-22 ENCOUNTER — HOSPITAL ENCOUNTER (OUTPATIENT)
Dept: INTERVENTIONAL RADIOLOGY/VASCULAR | Age: 59
Discharge: HOME | End: 2024-10-24
Payer: COMMERCIAL

## 2024-10-22 DIAGNOSIS — R79.89 ELEVATED D-DIMER: ICD-10-CM

## 2024-10-22 PROCEDURE — 93971 EXTREMITY STUDY: CPT

## 2024-10-22 NOTE — DISCHARGE INSTRUCTIONS
Please return to the ED tomorrow for your vascular ultrasound appointment at 1 PM.  If you develop a fever, difficulty breathing, chest pain, redness/warmth, pain on the left lower extremity, or any other concerns please return to the ED earlier.

## 2024-10-23 ENCOUNTER — TELEPHONE (OUTPATIENT)
Dept: NEPHROLOGY | Age: 59
End: 2024-10-23

## 2024-10-23 ENCOUNTER — OFFICE VISIT (OUTPATIENT)
Dept: FAMILY MEDICINE CLINIC | Age: 59
End: 2024-10-23

## 2024-10-23 ENCOUNTER — OFFICE VISIT (OUTPATIENT)
Dept: CARDIOLOGY CLINIC | Age: 59
End: 2024-10-23
Payer: COMMERCIAL

## 2024-10-23 VITALS
WEIGHT: 261.8 LBS | OXYGEN SATURATION: 99 % | SYSTOLIC BLOOD PRESSURE: 148 MMHG | HEART RATE: 60 BPM | DIASTOLIC BLOOD PRESSURE: 82 MMHG | BODY MASS INDEX: 33.6 KG/M2 | HEIGHT: 74 IN

## 2024-10-23 VITALS
BODY MASS INDEX: 33.83 KG/M2 | WEIGHT: 263.5 LBS | HEART RATE: 68 BPM | RESPIRATION RATE: 20 BRPM | SYSTOLIC BLOOD PRESSURE: 128 MMHG | DIASTOLIC BLOOD PRESSURE: 72 MMHG

## 2024-10-23 DIAGNOSIS — I25.5 ISCHEMIC CARDIOMYOPATHY: Primary | ICD-10-CM

## 2024-10-23 DIAGNOSIS — M79.662 PAIN IN LEFT LOWER LEG: ICD-10-CM

## 2024-10-23 DIAGNOSIS — Z91.89 AT RISK FOR FLUID VOLUME OVERLOAD: ICD-10-CM

## 2024-10-23 DIAGNOSIS — Z95.1 HX OF CABG: ICD-10-CM

## 2024-10-23 DIAGNOSIS — I42.0 DILATED CARDIOMYOPATHY (HCC): ICD-10-CM

## 2024-10-23 DIAGNOSIS — I48.0 PAROXYSMAL ATRIAL FIBRILLATION (HCC): ICD-10-CM

## 2024-10-23 DIAGNOSIS — M48.062 LUMBAR STENOSIS WITH NEUROGENIC CLAUDICATION: ICD-10-CM

## 2024-10-23 DIAGNOSIS — M25.473 REDNESS AND SWELLING OF ANKLE: Primary | ICD-10-CM

## 2024-10-23 DIAGNOSIS — Z95.810 S/P ICD (INTERNAL CARDIAC DEFIBRILLATOR) PROCEDURE: ICD-10-CM

## 2024-10-23 DIAGNOSIS — N18.4 STAGE 4 CHRONIC KIDNEY DISEASE (HCC): ICD-10-CM

## 2024-10-23 DIAGNOSIS — I25.810 CORONARY ARTERY DISEASE INVOLVING CORONARY BYPASS GRAFT OF NATIVE HEART WITHOUT ANGINA PECTORIS: ICD-10-CM

## 2024-10-23 DIAGNOSIS — R23.8 REDNESS AND SWELLING OF ANKLE: Primary | ICD-10-CM

## 2024-10-23 DIAGNOSIS — Z79.01 ANTICOAGULATED ON COUMADIN: ICD-10-CM

## 2024-10-23 DIAGNOSIS — D64.9 NORMOCYTIC ANEMIA: ICD-10-CM

## 2024-10-23 PROCEDURE — 3079F DIAST BP 80-89 MM HG: CPT | Performed by: NURSE PRACTITIONER

## 2024-10-23 PROCEDURE — 99214 OFFICE O/P EST MOD 30 MIN: CPT | Performed by: NURSE PRACTITIONER

## 2024-10-23 PROCEDURE — 3077F SYST BP >= 140 MM HG: CPT | Performed by: NURSE PRACTITIONER

## 2024-10-23 RX ORDER — CEPHALEXIN 500 MG/1
500 CAPSULE ORAL 3 TIMES DAILY
Qty: 21 CAPSULE | Refills: 0 | Status: SHIPPED | OUTPATIENT
Start: 2024-10-23 | End: 2024-10-30

## 2024-10-23 ASSESSMENT — ENCOUNTER SYMPTOMS
COUGH: 0
SHORTNESS OF BREATH: 0
VOMITING: 0
ABDOMINAL PAIN: 0
NAUSEA: 0
NAUSEA: 0
BACK PAIN: 1
COUGH: 0
SHORTNESS OF BREATH: 0
ABDOMINAL DISTENTION: 0

## 2024-10-23 NOTE — PATIENT INSTRUCTIONS
You may receive a survey regarding the care you received during your visit.  Your input is valuable to us.  We encourage you to complete and return your survey.  We hope you will choose us in the future for your healthcare needs.    Your nurses today were [place].  Office hours:   Mon-Thurs 8-4:30  Friday 8-12  Phone: [phone]    Continue:  Continue current medications  Daily weights and record  Fluid restriction of 2 Liters per day  Limit sodium in diet to around 3262-4271 mg/day  Monitor BP  Activity as tolerated     Call the Heart Failure Clinic for any of the following symptoms:   Weight gain of -3 pounds in 1 day or 5 pounds in 1 week  Increased shortness of breath  Shortness of breath while laying down  Cough  Chest pain  Swelling in feet, ankles or legs  Bloating in abdomen  Fatigue
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
## 2024-10-23 NOTE — PROGRESS NOTES
Heart Failure Clinic       Visit Date: 10/23/2024  Cardiologist:  Dr. Dempsey  Primary Care Physician: Dr. Adorno, Leonel W, APRN - CNP    Garrett Duncan is a 59 y.o. male who presents today for:  Chief Complaint   Patient presents with    New Patient     CHF - Baki pt        HPI:     TYPE HF: HFrEF 25% 2024   Cause: ischemic and ETOH abuse  Device: dual ICD  HX: CAD d/p CABG (2015) and PCI,   Dry Wt:  261 on 10/23/24      Garrett Duncan is a 59 y.o. male who presents to the office for a new patient visit in the heart failure clinic.    Concerns today: here today as a new pt referred from recent hospitalization for back pain - needs to have surgery but his hgb had been too low to complete. He was also evaluated for his ischemic cardiomyopathy d/t acute respiratory failure. He had been off his medications the week prior d/t binge drinking. He has been on bumex for several years w/ good urination.       Patient has:  Chest Pain: no  Lightheaded/dizzy: no  SOB: no - resolved since hospitalization  Orthopnea/PND: no  DUSTIN: no - never tested  Edema: resolved since discharge  Fatigue: improved  Abdominal bloating: no  Cough: no  Appetite: good    Activity: ADLs performed w/o WEATHERS, does have back pain  Diet: Educated - does not add salt, eats high Na foods sometimes but typically follows, under 2L/day    Patient follows:  Nephrology  Hematology     Hospitalization:        Admit date  8/6/2024               Discharge date: 8/9/2024       Past Medical History:   Diagnosis Date    Acute systolic CHF (congestive heart failure) (HCC) 07/16/2015    Alcohol abuse     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

## 2024-11-05 ENCOUNTER — LAB (OUTPATIENT)
Dept: LAB | Age: 59
End: 2024-11-05

## 2024-11-05 DIAGNOSIS — D64.9 NORMOCYTIC ANEMIA: ICD-10-CM

## 2024-11-05 LAB
IRON SATN MFR SERPL: 36 % (ref 20–50)
IRON SERPL-MCNC: 84 UG/DL (ref 65–195)
TIBC SERPL-MCNC: 233 UG/DL (ref 171–450)

## 2024-11-06 ENCOUNTER — TELEPHONE (OUTPATIENT)
Dept: CARDIOLOGY CLINIC | Age: 59
End: 2024-11-06

## 2024-11-06 NOTE — TELEPHONE ENCOUNTER
----- Message from ISABELLA Biggs CNP sent at 11/6/2024  6:49 AM EST -----  Iron levels have improved on PO iron - continue as is.

## 2024-11-08 ENCOUNTER — HOSPITAL ENCOUNTER (OUTPATIENT)
Dept: INTERVENTIONAL RADIOLOGY/VASCULAR | Age: 59
Discharge: HOME | End: 2024-11-10
Payer: COMMERCIAL

## 2024-11-08 DIAGNOSIS — M79.662 PAIN IN LEFT LOWER LEG: ICD-10-CM

## 2024-11-08 PROCEDURE — 93922 UPR/L XTREMITY ART 2 LEVELS: CPT

## 2024-11-11 DIAGNOSIS — R68.89 ABNORMAL ANKLE BRACHIAL INDEX (ABI): Primary | ICD-10-CM

## 2024-11-11 DIAGNOSIS — M25.473 REDNESS AND SWELLING OF ANKLE: ICD-10-CM

## 2024-11-11 DIAGNOSIS — M79.662 PAIN IN LEFT LOWER LEG: ICD-10-CM

## 2024-11-11 DIAGNOSIS — R23.8 REDNESS AND SWELLING OF ANKLE: ICD-10-CM

## 2024-11-14 ENCOUNTER — LAB (OUTPATIENT)
Dept: LAB | Age: 59
End: 2024-11-14

## 2024-11-14 DIAGNOSIS — I10 HTN (HYPERTENSION), BENIGN: ICD-10-CM

## 2024-11-14 DIAGNOSIS — D64.9 NORMOCYTIC ANEMIA: ICD-10-CM

## 2024-11-14 DIAGNOSIS — E11.21 DIABETIC NEPHROPATHY ASSOCIATED WITH TYPE 2 DIABETES MELLITUS (HCC): ICD-10-CM

## 2024-11-14 DIAGNOSIS — N18.4 CKD (CHRONIC KIDNEY DISEASE) STAGE 4, GFR 15-29 ML/MIN (HCC): ICD-10-CM

## 2024-11-14 LAB
25(OH)D3 SERPL-MCNC: 30 NG/ML (ref 30–100)
ALBUMIN SERPL BCG-MCNC: 3.7 G/DL (ref 3.5–5.1)
ALP SERPL-CCNC: 84 U/L (ref 38–126)
ALT SERPL W/O P-5'-P-CCNC: 19 U/L (ref 11–66)
ANION GAP SERPL CALC-SCNC: 15 MEQ/L (ref 8–16)
AST SERPL-CCNC: 20 U/L (ref 5–40)
BILIRUB CONJ SERPL-MCNC: 0.2 MG/DL (ref 0.1–13.8)
BILIRUB SERPL-MCNC: 0.4 MG/DL (ref 0.3–1.2)
BUN SERPL-MCNC: 71 MG/DL (ref 7–22)
CALCIUM SERPL-MCNC: 8.7 MG/DL (ref 8.5–10.5)
CHLORIDE SERPL-SCNC: 106 MEQ/L (ref 98–111)
CO2 SERPL-SCNC: 19 MEQ/L (ref 23–33)
CREAT SERPL-MCNC: 4 MG/DL (ref 0.4–1.2)
DEPRECATED RDW RBC AUTO: 54 FL (ref 35–45)
ERYTHROCYTE [DISTWIDTH] IN BLOOD BY AUTOMATED COUNT: 14.8 % (ref 11.5–14.5)
GFR SERPL CREATININE-BSD FRML MDRD: 16 ML/MIN/1.73M2
GLUCOSE SERPL-MCNC: 104 MG/DL (ref 70–108)
HCT VFR BLD AUTO: 35.1 % (ref 42–52)
HGB BLD-MCNC: 11.6 GM/DL (ref 14–18)
MCH RBC QN AUTO: 33 PG (ref 26–33)
MCHC RBC AUTO-ENTMCNC: 33 GM/DL (ref 32.2–35.5)
MCV RBC AUTO: 99.7 FL (ref 80–94)
PHOSPHATE SERPL-MCNC: 4.7 MG/DL (ref 2.4–4.7)
PLATELET # BLD AUTO: 133 THOU/MM3 (ref 130–400)
PMV BLD AUTO: 10.2 FL (ref 9.4–12.4)
POTASSIUM SERPL-SCNC: 4.8 MEQ/L (ref 3.5–5.2)
PROT SERPL-MCNC: 6.9 G/DL (ref 6.1–8)
PTH-INTACT SERPL-MCNC: 250.2 PG/ML (ref 15–65)
RBC # BLD AUTO: 3.52 MILL/MM3 (ref 4.7–6.1)
SODIUM SERPL-SCNC: 140 MEQ/L (ref 135–145)
WBC # BLD AUTO: 6 THOU/MM3 (ref 4.8–10.8)

## 2024-11-20 ENCOUNTER — OFFICE VISIT (OUTPATIENT)
Dept: NEPHROLOGY | Age: 59
End: 2024-11-20
Payer: COMMERCIAL

## 2024-11-20 VITALS
SYSTOLIC BLOOD PRESSURE: 182 MMHG | BODY MASS INDEX: 34.54 KG/M2 | WEIGHT: 269 LBS | OXYGEN SATURATION: 98 % | HEART RATE: 67 BPM | DIASTOLIC BLOOD PRESSURE: 90 MMHG

## 2024-11-20 DIAGNOSIS — N25.81 SECONDARY HYPERPARATHYROIDISM (HCC): ICD-10-CM

## 2024-11-20 DIAGNOSIS — N18.4 CKD (CHRONIC KIDNEY DISEASE) STAGE 4, GFR 15-29 ML/MIN (HCC): Primary | ICD-10-CM

## 2024-11-20 DIAGNOSIS — E11.21 DIABETIC NEPHROPATHY ASSOCIATED WITH TYPE 2 DIABETES MELLITUS (HCC): ICD-10-CM

## 2024-11-20 DIAGNOSIS — I10 HTN (HYPERTENSION), BENIGN: ICD-10-CM

## 2024-11-20 PROCEDURE — 99214 OFFICE O/P EST MOD 30 MIN: CPT | Performed by: INTERNAL MEDICINE

## 2024-11-20 PROCEDURE — 3077F SYST BP >= 140 MM HG: CPT | Performed by: INTERNAL MEDICINE

## 2024-11-20 PROCEDURE — 3080F DIAST BP >= 90 MM HG: CPT | Performed by: INTERNAL MEDICINE

## 2024-11-20 PROCEDURE — 3044F HG A1C LEVEL LT 7.0%: CPT | Performed by: INTERNAL MEDICINE

## 2024-11-20 PROCEDURE — G2211 COMPLEX E/M VISIT ADD ON: HCPCS | Performed by: INTERNAL MEDICINE

## 2024-11-20 RX ORDER — CALCITRIOL 0.25 UG/1
0.25 CAPSULE, LIQUID FILLED ORAL DAILY
Qty: 30 CAPSULE | Refills: 3 | Status: SHIPPED | OUTPATIENT
Start: 2024-11-20

## 2024-11-20 RX ORDER — CALCITRIOL 0.25 UG/1
0.25 CAPSULE, LIQUID FILLED ORAL DAILY
Qty: 90 CAPSULE | OUTPATIENT
Start: 2024-11-20

## 2024-11-20 RX ORDER — HYDRALAZINE HYDROCHLORIDE 50 MG/1
50 TABLET, FILM COATED ORAL 3 TIMES DAILY
Qty: 90 TABLET | Refills: 3 | Status: SHIPPED | OUTPATIENT
Start: 2024-11-20

## 2024-11-20 NOTE — ED NOTES
"Subjective:       Patient ID: Amara Diamond is a 22 y.o. female.    Vitals:  height is 5' 5.35" (1.66 m) and weight is 62.6 kg (137 lb 14.4 oz). Her temperature is 98.2 °F (36.8 °C). Her blood pressure is 114/78 and her pulse is 82. Her respiration is 18 and oxygen saturation is 100%.     Chief Complaint: Vaginal Pain    22-year-old  female with no significant prior medical history complaining of right labia swelling and redness x 4 days after 1st time waxing procedure.  States she has tried cortisone cream with no significant improvement.  Denies any vaginal discharge or vaginal itching.        Genitourinary:  Positive for vaginal pain. Negative for vaginal discharge, vaginal bleeding and vaginal odor.   Skin:  Positive for erythema.   Allergic/Immunologic: Negative for itching.       Objective:      Physical Exam   Constitutional: She is oriented to person, place, and time. She appears well-developed. She is cooperative. She is easily aroused.  Non-toxic appearance. She does not appear ill. normal and well-groomedawake  HENT:   Head: Normocephalic and atraumatic.   Abdominal: Normal appearance.   Genitourinary:       Rex stage (genital) is 5.    Pelvic exam was performed with patient supine.   There is tenderness on the right labia.         Comments: Area circled is erythematous, + swelling, no appreciated rash or lesions,+ ttp.      Lymphadenopathy: No inguinal adenopathy noted on the right or left side.   Neurological: She is alert, oriented to person, place, and time and easily aroused. Gait normal. GCS eye subscore is 4. GCS verbal subscore is 5. GCS motor subscore is 6.   Skin: Skin is warm, dry, intact and not diaphoretic. Capillary refill takes less than 2 seconds. erythema   Psychiatric: Her speech is normal and behavior is normal.   Nursing note and vitals reviewed.        Assessment:       1. Allergic reaction to chemical substance, accidental or unintentional, initial encounter    2. " Pt medicated per MAR. Vital signs reassessed, respirations even and unlabored. No needs voiced   Swelling of labia          Plan:     Discussed with patient's symptoms may be a reaction to waxing substance, encouraged to wear loose-fitting, may apply cool compresses 15 minute intervals, may benefit from Sitz bath, we will prescribe short course of oral steroids, patient has tried over-the-counter cortisone with no improvement.  Encouraged to follow up PCP if symptoms does not improve with prescriptive treatment.    Allergic reaction to chemical substance, accidental or unintentional, initial encounter    Swelling of labia  -     meloxicam (MOBIC) 7.5 MG tablet; Take 1 tablet (7.5 mg total) by mouth daily as needed for Pain (vaginal pain).  Dispense: 20 tablet; Refill: 0  -     triamcinolone acetonide 0.1% (KENALOG) 0.1 % ointment; Apply topically 2 (two) times daily. for 7 days  Dispense: 80 g; Refill: 0  -     predniSONE (DELTASONE) 20 MG tablet; Take 1 tablet (20 mg total) by mouth once daily. for 5 days  Dispense: 5 tablet; Refill: 0

## 2024-11-20 NOTE — PROGRESS NOTES
[facility]        Outpatient Follow-Up note         11/20/2024 11:32 AM    Patient Name:   Garrett Duncan  YOB: 1965  Primary Care Physician:  Leonel Adorno APRN - [facility]     Chief Complaint / Reason for follow-up : Follow Up of CKD      Interval History :  Patient seen and examined by me. No cp or SOB.  Overall doing ok. 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     Intradural extramedullary spinal tumor     Long term (current) use of anticoagulants 08/29/2019    Medtr[employer] dual icd      Neuromuscular disorder ([facility])     Paroxysmal atrial fibrillation ([facility]) 07/22/2014    Prolonged emergence from general anesthesia     from CABG surgery    Severe obesity (BMI 35.0-39.9) with comorbidity 05/22/2023    V-tach ([facility])     s/p ablation in dec 2014     Past Surgical History:   Procedure Laterality Date    CARDIAC CATHETERIZATION  03/20/2014    [facility]    CARDIAC DEFIBRILLATOR PLACEMENT  10/13/2015    [employer] [place], MRI CONDITIONAL ICD    CARDIAC ELECTROPHYSIOLOGY STUDY AND ABLATION      CORONARY ARTERY BYPASS GRAFT  05/09/2014    3 bypass    EKG 12-LEAD  07/17/2015         OTHER SURGICAL HISTORY Left 10/21/2014    Left Bursectomy, I & D Left Elbow - Dr. Garduno    PACEMAKER PLACEMENT      PACER INTERROGATE  3/8/2024    CA LAMINECTOMY W/O FFD > 2 VERT SEG LUMBAR N/A 01/16/2018    LUMBAR AND SACRAL LAMINECTOMY, REMOVAL OF INTRASPINAL TUMORS performed by Burke Garcia MD at [facility] [[email or web address]] OR    VASCULAR

## 2024-12-04 ENCOUNTER — TELEPHONE (OUTPATIENT)
Age: 59
End: 2024-12-04

## 2024-12-04 ENCOUNTER — OFFICE VISIT (OUTPATIENT)
Age: 59
End: 2024-12-04
Payer: COMMERCIAL

## 2024-12-04 VITALS
BODY MASS INDEX: 34.08 KG/M2 | HEIGHT: 74 IN | HEART RATE: 64 BPM | SYSTOLIC BLOOD PRESSURE: 159 MMHG | DIASTOLIC BLOOD PRESSURE: 84 MMHG | WEIGHT: 265.6 LBS

## 2024-12-04 DIAGNOSIS — I70.212 ATHEROSCLER OF NATIVE ARTERY OF LEFT LEG WITH INTERMIT CLAUDICATION (HCC): ICD-10-CM

## 2024-12-04 DIAGNOSIS — I70.242 ATHEROSCLEROSIS OF NATIVE ARTERY OF LEFT LOWER EXTREMITY WITH ULCERATION OF CALF (HCC): Primary | ICD-10-CM

## 2024-12-04 PROCEDURE — 99204 OFFICE O/P NEW MOD 45 MIN: CPT | Performed by: SURGERY

## 2024-12-04 PROCEDURE — 3078F DIAST BP <80 MM HG: CPT | Performed by: SURGERY

## 2024-12-04 PROCEDURE — 3077F SYST BP >= 140 MM HG: CPT | Performed by: SURGERY

## 2024-12-04 NOTE — TELEPHONE ENCOUNTER
Garrett is scheduled for IR left lower ext arteriogram with intervention with Dr. Bushc on 1/6/2025 at 9:00 am. Pt agreed to date/time.     Surgery instructions are as follows:  - Arrive to Same Day Surgery at [facility] at 7:00 am  - NPO after midnight the night before surgery  - NO medication holds per Dr. Vallecillo  - You must have a  day of surgery        All instructions discussed with pt, he verbalized understanding. he  denied questions or concerns at this time.     Primary insurance is [employer] Will obtain prior authorization as necessary.

## 2024-12-04 NOTE — PROGRESS NOTES
already 4.  I would go ahead with SFA intervention.  As I am not here for approximately 6 to 8 weeks I will have my partner see him in several weeks to perform this procedure.  The patient agrees to proceed.    Electronically signed by:  Garrett Vallecillo MD

## 2024-12-04 NOTE — PATIENT INSTRUCTIONS
If you receive a survey asking about your care experience, please respond. Your answers will help ensure you receive high-quality care at this office. Thank you!    Your Medical Assistant today: Susie GONZALEZ  Thank you for coming to our office! It was a pleasure to serve you.

## 2024-12-09 ENCOUNTER — TELEPHONE (OUTPATIENT)
Dept: FAMILY MEDICINE CLINIC | Age: 59
End: 2024-12-09

## 2024-12-09 NOTE — TELEPHONE ENCOUNTER
The patient called in stating that he has had a wound on the back of his left calf for several weeks now.  He was referred to a vascular surgeon and will have a stent placed in that leg on January 6th.  He is concerned about the wound since the surgery is not until January and would like to have some labs done to make sure he does not have a infection.  He states that it is not draining but is red and sore to touch and has a burning sensation. States that he has been putting a Alocaine topical on it.  Please advise.      The patient uses [address]       Please call the patient back.

## 2024-12-10 ENCOUNTER — TELEPHONE (OUTPATIENT)
Dept: CARDIOLOGY CLINIC | Age: 59
End: 2024-12-10

## 2024-12-11 ENCOUNTER — OFFICE VISIT (OUTPATIENT)
Dept: FAMILY MEDICINE CLINIC | Age: 59
End: 2024-12-11

## 2024-12-11 VITALS
BODY MASS INDEX: 34.7 KG/M2 | DIASTOLIC BLOOD PRESSURE: 76 MMHG | SYSTOLIC BLOOD PRESSURE: 132 MMHG | HEART RATE: 64 BPM | WEIGHT: 270.3 LBS | RESPIRATION RATE: 16 BRPM

## 2024-12-11 DIAGNOSIS — L08.9 SOFT TISSUE INFECTION: ICD-10-CM

## 2024-12-11 DIAGNOSIS — N18.4 CKD (CHRONIC KIDNEY DISEASE) STAGE 4, GFR 15-29 ML/MIN (HCC): ICD-10-CM

## 2024-12-11 DIAGNOSIS — R23.8 REDNESS AND SWELLING OF ANKLE: ICD-10-CM

## 2024-12-11 DIAGNOSIS — M25.473 REDNESS AND SWELLING OF ANKLE: ICD-10-CM

## 2024-12-11 DIAGNOSIS — I73.9 PAD (PERIPHERAL ARTERY DISEASE) (HCC): ICD-10-CM

## 2024-12-11 DIAGNOSIS — S81.802A LEG WOUND, LEFT, INITIAL ENCOUNTER: Primary | ICD-10-CM

## 2024-12-11 RX ORDER — CEPHALEXIN 500 MG/1
500 CAPSULE ORAL 3 TIMES DAILY
Qty: 30 CAPSULE | Refills: 0 | Status: SHIPPED | OUTPATIENT
Start: 2024-12-11 | End: 2024-12-21

## 2024-12-11 ASSESSMENT — ENCOUNTER SYMPTOMS
SHORTNESS OF BREATH: 0
BACK PAIN: 1
COUGH: 0
NAUSEA: 0
ABDOMINAL PAIN: 0
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
## 2024-12-20 ENCOUNTER — PREP FOR PROCEDURE (OUTPATIENT)
Dept: CARDIOTHORACIC SURGERY | Age: 59
End: 2024-12-20

## 2024-12-20 RX ORDER — SODIUM CHLORIDE 9 MG/ML
INJECTION, SOLUTION INTRAVENOUS CONTINUOUS
Status: CANCELLED | OUTPATIENT
Start: 2024-12-20

## 2024-12-20 RX ORDER — SODIUM CHLORIDE 9 MG/ML
INJECTION, SOLUTION INTRAVENOUS PRN
Status: CANCELLED | OUTPATIENT
Start: 2024-12-20

## 2024-12-20 RX ORDER — SODIUM CHLORIDE 0.9 % (FLUSH) 0.9 %
5-40 SYRINGE (ML) INJECTION EVERY 12 HOURS SCHEDULED
Status: CANCELLED | OUTPATIENT
Start: 2024-12-20

## 2024-12-20 RX ORDER — SODIUM CHLORIDE 0.9 % (FLUSH) 0.9 %
5-40 SYRINGE (ML) INJECTION PRN
Status: CANCELLED | OUTPATIENT
Start: 2024-12-20

## 2025-01-03 NOTE — DISCHARGE SUMMARY
Hospitalist Discharge Summary    Patient: Garrett Duncan  YOB: 1965  MRN: [id number]   Acct: [id number]    Primary Care Physician: Leonel Adorno, ISABELLA - [facility]    Admit date  7/22/2024    Discharge date: 7/27/2024     Chief Complaint on presentation: BLE edema     Discharge Diagnoses / Assessment and Plan:    Acute on chronic HFrEF  Dilated ICMP  Echo 7/23/24- EF 25% (30-35% per TTE 03/2024). Cardiology following. Not a significant change to require further workup. Has AICD.  Pt treated with Bumex gtt, PO Metolazone. Significant improvement in edema.   Daily weights. Fluid/salt restriction. Wrap BLE.   Needs to follow up with CHF clinic at [place].   Continue Bumex 2mg BID.   Metolazone 2.5mg added daily PRN for increased swelling, SOB, or weight gain.   CHF clinic 2 wks  Cardiology 4 wks.   BMP and PCP follow up 1 wk.   CKD stage IV  Likely underlying hypertensive nephrosclerosis   Cr and electrolytes remained stable with baseline with IV diuresis.   Repeat BMP in 5 days, follow up with PCP and Nephrology.   Non-sustained Vtach   H/o NSVT, s/p ablation in 2014.   8 beat run of VT 7/23.   Cardiology following - contacted [facility] who advised to stop mexlitine and he will follow up as OP.   Keep Mg >2, K >4.   Paroxysmal AFib  H/o CV 4/2022, h/o ablation.   Rate controlled on amiodarone, metoprolol.  Anticoagulated on Coumadin.  INR subtherapeutic, currently on Lovenox.  Elevated troponin    Cardio following. Chronic and demand related.      Chronic Conditions (reviewed, stable, and home medications resumed, unless otherwise stated)  Chronic anemia: stable with baseline. No signs of bleeding.  Did require PRBC transfusion during previous admission, was given referral for outpatient gastroenterology.  Chronic lumbar pain: L4 1 to L5 degenerative disc disease with stenosis and claudication patient states he has anticipated orthopedic intervention September 2024   Medical noncompliance       Initial H&P and  \"Have you been to the ER, urgent care clinic since your last visit?  Hospitalized since your last visit?\"    YES - When: approximately 3 days ago.  Where and Why: ear pain .    “Have you seen or consulted any other health care providers outside our system since your last visit?”    NO    Have you had a mammogram?”   NO    Date of last Mammogram: 1/21/2021       “Have you had a colorectal cancer screening such as a colonoscopy/FIT/Cologuard?    NO    No colonoscopy on file  No cologuard on file  Date of last FIT: 6/14/2023   No flexible sigmoidoscopy on file             plan and depression screen were discussed.  Recent labs/imaging were discussed and reviewed with patient.

## 2025-01-06 ENCOUNTER — TELEPHONE (OUTPATIENT)
Dept: NEPHROLOGY | Age: 60
End: 2025-01-06

## 2025-01-06 PROBLEM — I70.242 ATHEROSCLEROSIS OF NATIVE ARTERY OF LEFT LOWER EXTREMITY WITH ULCERATION OF CALF (HCC): Status: ACTIVE | Noted: 2025-01-06

## 2025-01-06 NOTE — TELEPHONE ENCOUNTER
Janki from [address] [place] [address] called and said patient was supposed to have a angiogram done today but it got cancelled because the PT INR was too high.  She saw that his CRE is up to 5.6.  His previous CRE in November was 4.0.  She is questioning if it could be caused by the bumex.  Please advise.

## 2025-01-07 ENCOUNTER — OFFICE VISIT (OUTPATIENT)
Dept: NEPHROLOGY | Age: 60
End: 2025-01-07
Payer: COMMERCIAL

## 2025-01-07 VITALS — OXYGEN SATURATION: 100 % | DIASTOLIC BLOOD PRESSURE: 73 MMHG | HEART RATE: 69 BPM | SYSTOLIC BLOOD PRESSURE: 168 MMHG

## 2025-01-07 DIAGNOSIS — N17.9 ACUTE RENAL FAILURE, UNSPECIFIED ACUTE RENAL FAILURE TYPE (HCC): ICD-10-CM

## 2025-01-07 DIAGNOSIS — N25.81 SECONDARY HYPERPARATHYROIDISM (HCC): ICD-10-CM

## 2025-01-07 DIAGNOSIS — E66.01 MORBID (SEVERE) OBESITY DUE TO EXCESS CALORIES: ICD-10-CM

## 2025-01-07 DIAGNOSIS — E11.21 DIABETIC NEPHROPATHY ASSOCIATED WITH TYPE 2 DIABETES MELLITUS (HCC): ICD-10-CM

## 2025-01-07 DIAGNOSIS — N18.5 STAGE 5 CHRONIC KIDNEY DISEASE NOT ON CHRONIC DIALYSIS (HCC): ICD-10-CM

## 2025-01-07 DIAGNOSIS — N18.4 CKD (CHRONIC KIDNEY DISEASE) STAGE 4, GFR 15-29 ML/MIN (HCC): Primary | ICD-10-CM

## 2025-01-07 PROCEDURE — G2211 COMPLEX E/M VISIT ADD ON: HCPCS | Performed by: INTERNAL MEDICINE

## 2025-01-07 PROCEDURE — 3078F DIAST BP <80 MM HG: CPT | Performed by: INTERNAL MEDICINE

## 2025-01-07 PROCEDURE — 99214 OFFICE O/P EST MOD 30 MIN: CPT | Performed by: INTERNAL MEDICINE

## 2025-01-07 PROCEDURE — 3077F SYST BP >= 140 MM HG: CPT | Performed by: INTERNAL MEDICINE

## 2025-01-07 NOTE — PROGRESS NOTES
5.6  -Not exactly sure whether this is progressive CKD or some acute kidney injury.  He is on Bumex Entresto and Farxiga  -At this time I will discontinue the Farxiga advised to take the Bumex on an as-needed basis for any weight gain of 3 to 5 pounds  -Drink no more than 60 ounces of liquids per day check a BMP in 1 week  - refer to dialysis clinic for education    essential hypertension running high continue current medications  Proteinuria secondary to diabetes and serologic workup is negative.   Diabetes mellitus -on ARB and also on Farxiga with diabetic nephropathy.  Hold Farxiga at this time  BPH on Flomax is urology   Complex renal mass seen by urology, probably cyst on the recent scan  meds reviewed and D/W patient  FU in 1 months    Tests and orders placed this Encounter     No orders of the defined types were placed in this encounter.        Samuel Kathleen M.D  [facility].

## 2025-01-10 RX ORDER — SODIUM CHLORIDE 0.9 % (FLUSH) 0.9 %
5-40 SYRINGE (ML) INJECTION EVERY 12 HOURS SCHEDULED
Status: CANCELLED | OUTPATIENT
Start: 2025-01-10

## 2025-01-10 RX ORDER — SODIUM CHLORIDE 0.9 % (FLUSH) 0.9 %
5-40 SYRINGE (ML) INJECTION PRN
Status: CANCELLED | OUTPATIENT
Start: 2025-01-10

## 2025-01-10 RX ORDER — SODIUM CHLORIDE 9 MG/ML
INJECTION, SOLUTION INTRAVENOUS CONTINUOUS
Status: CANCELLED | OUTPATIENT
Start: 2025-01-10

## 2025-01-10 RX ORDER — SODIUM CHLORIDE 9 MG/ML
INJECTION, SOLUTION INTRAVENOUS PRN
Status: CANCELLED | OUTPATIENT
Start: 2025-01-10

## 2025-01-13 ENCOUNTER — APPOINTMENT (OUTPATIENT)
Dept: GENERAL RADIOLOGY | Age: 60
End: 2025-01-13
Payer: COMMERCIAL

## 2025-01-13 ENCOUNTER — HOSPITAL ENCOUNTER (EMERGENCY)
Age: 60
Discharge: HOME | End: 2025-01-13
Attending: EMERGENCY MEDICINE
Payer: COMMERCIAL

## 2025-01-13 VITALS
SYSTOLIC BLOOD PRESSURE: 174 MMHG | DIASTOLIC BLOOD PRESSURE: 97 MMHG | TEMPERATURE: 98.2 F | OXYGEN SATURATION: 97 % | RESPIRATION RATE: 16 BRPM | HEART RATE: 73 BPM

## 2025-01-13 DIAGNOSIS — T14.8XXA CHRONIC WOUND: Primary | ICD-10-CM

## 2025-01-13 PROCEDURE — 6370000000 HC RX 637 (ALT 250 FOR IP): Performed by: EMERGENCY MEDICINE

## 2025-01-13 PROCEDURE — 99283 EMERGENCY DEPT VISIT LOW MDM: CPT

## 2025-01-13 PROCEDURE — 73590 X-RAY EXAM OF LOWER LEG: CPT

## 2025-01-13 RX ORDER — GINSENG 100 MG
CAPSULE ORAL 3 TIMES DAILY
Status: DISCONTINUED | OUTPATIENT
Start: 2025-01-13 | End: 2025-01-13 | Stop reason: HOSPADM

## 2025-01-13 RX ADMIN — BACITRACIN: 500 OINTMENT TOPICAL at 19:57

## 2025-01-13 ASSESSMENT — PAIN DESCRIPTION - DESCRIPTORS: DESCRIPTORS: BURNING

## 2025-01-13 ASSESSMENT — PAIN DESCRIPTION - ORIENTATION: ORIENTATION: LEFT

## 2025-01-13 ASSESSMENT — PAIN SCALES - GENERAL: PAINLEVEL_OUTOF10: 6

## 2025-01-13 ASSESSMENT — PAIN - FUNCTIONAL ASSESSMENT: PAIN_FUNCTIONAL_ASSESSMENT: 0-10

## 2025-01-13 ASSESSMENT — PAIN DESCRIPTION - LOCATION: LOCATION: LEG

## 2025-01-13 NOTE — ED PROVIDER NOTES
Pt Name: Garrett Duncan  MRN: [id number]  Birthdate 1965  Date of evaluation: 1/13/2025  ED Resident: Analilia Alcazar MD  ED Attending: Dr. Fleming    CHIEF COMPLAINT     No chief complaint on file.    History obtained from the patient.    HISTORY OF PRESENT ILLNESS    HPI  Garrett Duncan is a 59 y.o. male who presents to the emergency department for evaluation of wound check.    Patient states that he has had a chronic wound on the back of his left lower leg for multiple months.  He has peripheral artery disease, was post to get a stent placed recently, but he did not stop taking his Coumadin so he never went for the procedure.  Denies worsening pain, however, concern for infection.  States that he noticed it has become a little bit more swollen.  He does take pain medication for his back, and he said that that does help with his leg as well.  Denies fevers, trauma to his lower leg, but he did injure his left knee a few weeks ago.  Able to ambulate.  He quit smoking but does still dip.      The patient has no other acute complaints at this time.    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     Intradural extramedullary spinal tumor     Long term (current) use of anticoagulants 08/29/2019    Medtronic dual icd      Neuromuscular disorder (HCC)     Paroxysmal atrial fibrillation (HCC) 07/22/2014    Prolonged emergence from general anesthesia     from CABG surgery    Severe obesity (BMI 35.0-39.9) with comorbidity 05/22/2023

## 2025-01-13 NOTE — ED TRIAGE NOTES
Pt to ER with concern for wound infection on L calf. Pt states wound has been there x a few months. Pt denies fever or chills. No obvious redness or drainage noted.

## 2025-01-13 NOTE — DISCHARGE INSTRUCTIONS
Please be sure to follow-up with Dr. Busch as soon as possible for your stent.  You should apply antibiotic ointment and change dressings 2 times daily.  Please also read the attached information on quitting smokeless tobacco.

## 2025-01-14 ENCOUNTER — LAB (OUTPATIENT)
Dept: LAB | Age: 60
End: 2025-01-14

## 2025-01-14 ENCOUNTER — TELEPHONE (OUTPATIENT)
Dept: FAMILY MEDICINE CLINIC | Age: 60
End: 2025-01-14

## 2025-01-14 DIAGNOSIS — N17.9 ACUTE RENAL FAILURE, UNSPECIFIED ACUTE RENAL FAILURE TYPE (HCC): ICD-10-CM

## 2025-01-14 DIAGNOSIS — N18.4 CKD (CHRONIC KIDNEY DISEASE) STAGE 4, GFR 15-29 ML/MIN (HCC): ICD-10-CM

## 2025-01-14 DIAGNOSIS — E11.21 DIABETIC NEPHROPATHY ASSOCIATED WITH TYPE 2 DIABETES MELLITUS (HCC): ICD-10-CM

## 2025-01-14 DIAGNOSIS — N25.81 SECONDARY HYPERPARATHYROIDISM (HCC): ICD-10-CM

## 2025-01-14 LAB
ANION GAP SERPL CALC-SCNC: 18 MEQ/L (ref 8–16)
BUN SERPL-MCNC: 63 MG/DL (ref 7–22)
CALCIUM SERPL-MCNC: 8.9 MG/DL (ref 8.5–10.5)
CHLORIDE SERPL-SCNC: 103 MEQ/L (ref 98–111)
CO2 SERPL-SCNC: 15 MEQ/L (ref 23–33)
CREAT SERPL-MCNC: 4.4 MG/DL (ref 0.4–1.2)
GFR SERPL CREATININE-BSD FRML MDRD: 15 ML/MIN/1.73M2
GLUCOSE SERPL-MCNC: 100 MG/DL (ref 70–108)
POTASSIUM SERPL-SCNC: 4.8 MEQ/L (ref 3.5–5.2)
SODIUM SERPL-SCNC: 136 MEQ/L (ref 135–145)

## 2025-01-14 NOTE — TELEPHONE ENCOUNTER
The patient was seen in the ED yesterday for a leg wound.  Attempted to call the patient to check on him and to schedule his ED follow up appt.  Left a VM asking him to call the office back.

## 2025-01-16 ENCOUNTER — TELEPHONE (OUTPATIENT)
Age: 60
End: 2025-01-16

## 2025-01-16 NOTE — TELEPHONE ENCOUNTER
Garrett is concerned about his kidney condition and risk of contrast being used for his procedure 1/23/2025.   He would like to know:    1. If contrast will be used during the procedure.    2. How will the contrast effect his kidney function? ( He is worried that it will cause him to have to start dialysis sooner)    3. How long will he be off work after the procedure?      Please advise.

## 2025-01-16 NOTE — TELEPHONE ENCOUNTER
We will be using a very minimal amount of contrast.  Usually I can get away with approximately 10 cc of contrast for these types of procedures.  We can also use carbon dioxide for these procedures but the detail that 1 sees with a CO2 arteriogram are not nearly as good as the detail that can be seen with contrast.  In contradistinction a radiographic exam such as a CT uses approximately 100 cc of contrast.  Therefore our dose will be much less.  It can affect his kidneys but at this dose it would be unusual.  For this procedure he needs to be off work for 2 days.

## 2025-01-17 ENCOUNTER — TELEPHONE (OUTPATIENT)
Age: 60
End: 2025-01-17

## 2025-01-17 NOTE — TELEPHONE ENCOUNTER
I spoke with Garrett to relay Dr. Vallecillo's response. Garrett voiced understanding and confirmed he will arrive at 11:00 am 1/23/2025.

## 2025-01-22 ENCOUNTER — TELEPHONE (OUTPATIENT)
Dept: CARDIOLOGY CLINIC | Age: 60
End: 2025-01-22

## 2025-01-22 NOTE — TELEPHONE ENCOUNTER
Patient is worried about his coumadin.   Reports Dr. Vallecillo is doing surgery on him tomorrow  He was told that he could continue the coumadin initially,    He received a call from Vascular today and was told that he was to stop his coumadin today and tomorrow and recheck his INR tomorrow again.     Call to vascular to confirm INR is going to be checked again tomorrow before surgery.     Patient wants Dr. Dempsey's recommendations because he has already held his coumadin before procedures.

## 2025-01-23 ENCOUNTER — HOSPITAL ENCOUNTER (OUTPATIENT)
Dept: INTERVENTIONAL RADIOLOGY/VASCULAR | Age: 60
Discharge: HOME | End: 2025-01-23
Attending: SURGERY | Admitting: SURGERY
Payer: COMMERCIAL

## 2025-01-23 VITALS
RESPIRATION RATE: 14 BRPM | BODY MASS INDEX: 34.94 KG/M2 | WEIGHT: 272.27 LBS | HEART RATE: 66 BPM | DIASTOLIC BLOOD PRESSURE: 62 MMHG | OXYGEN SATURATION: 96 % | TEMPERATURE: 98.4 F | SYSTOLIC BLOOD PRESSURE: 149 MMHG | HEIGHT: 74 IN

## 2025-01-23 LAB
ABO GROUP BLD: NORMAL
ANION GAP SERPL CALC-SCNC: 13 MEQ/L (ref 8–16)
BUN SERPL-MCNC: 51 MG/DL (ref 7–22)
CALCIUM SERPL-MCNC: 8.7 MG/DL (ref 8.5–10.5)
CHLORIDE SERPL-SCNC: 108 MEQ/L (ref 98–111)
CO2 SERPL-SCNC: 16 MEQ/L (ref 23–33)
CREAT SERPL-MCNC: 4.1 MG/DL (ref 0.4–1.2)
DEPRECATED RDW RBC AUTO: 52.4 FL (ref 35–45)
ERYTHROCYTE [DISTWIDTH] IN BLOOD BY AUTOMATED COUNT: 13.9 % (ref 11.5–14.5)
GFR SERPL CREATININE-BSD FRML MDRD: 16 ML/MIN/1.73M2
GLUCOSE SERPL-MCNC: 106 MG/DL (ref 70–108)
HCT VFR BLD AUTO: 29.4 % (ref 42–52)
HGB BLD-MCNC: 9.4 GM/DL (ref 14–18)
IAT IGG-SP REAG SERPL QL: NORMAL
INR PPP: 2.16 (ref 0.85–1.13)
MCH RBC QN AUTO: 32.8 PG (ref 26–33)
MCHC RBC AUTO-ENTMCNC: 32 GM/DL (ref 32.2–35.5)
MCV RBC AUTO: 102.4 FL (ref 80–94)
PLATELET # BLD AUTO: 147 THOU/MM3 (ref 130–400)
PMV BLD AUTO: 9.8 FL (ref 9.4–12.4)
POTASSIUM SERPL-SCNC: 5.7 MEQ/L (ref 3.5–5.2)
RBC # BLD AUTO: 2.87 MILL/MM3 (ref 4.7–6.1)
RH BLD: NORMAL
SODIUM SERPL-SCNC: 137 MEQ/L (ref 135–145)
WBC # BLD AUTO: 7.3 THOU/MM3 (ref 4.8–10.8)

## 2025-01-23 PROCEDURE — 80048 BASIC METABOLIC PNL TOTAL CA: CPT

## 2025-01-23 PROCEDURE — 36415 COLL VENOUS BLD VENIPUNCTURE: CPT

## 2025-01-23 PROCEDURE — 85610 PROTHROMBIN TIME: CPT

## 2025-01-23 PROCEDURE — 86885 COOMBS TEST INDIRECT QUAL: CPT

## 2025-01-23 PROCEDURE — 86901 BLOOD TYPING SEROLOGIC RH(D): CPT

## 2025-01-23 PROCEDURE — 85027 COMPLETE CBC AUTOMATED: CPT

## 2025-01-23 PROCEDURE — 86900 BLOOD TYPING SEROLOGIC ABO: CPT

## 2025-01-23 RX ORDER — SODIUM CHLORIDE 9 MG/ML
INJECTION, SOLUTION INTRAVENOUS PRN
Status: DISCONTINUED | OUTPATIENT
Start: 2025-01-23 | End: 2025-01-23 | Stop reason: HOSPADM

## 2025-01-23 RX ORDER — SODIUM CHLORIDE 9 MG/ML
INJECTION, SOLUTION INTRAVENOUS CONTINUOUS
Status: DISCONTINUED | OUTPATIENT
Start: 2025-01-23 | End: 2025-01-23 | Stop reason: HOSPADM

## 2025-01-23 RX ORDER — SODIUM CHLORIDE 0.9 % (FLUSH) 0.9 %
5-40 SYRINGE (ML) INJECTION PRN
Status: DISCONTINUED | OUTPATIENT
Start: 2025-01-23 | End: 2025-01-23 | Stop reason: HOSPADM

## 2025-01-23 RX ORDER — SODIUM CHLORIDE 0.9 % (FLUSH) 0.9 %
5-40 SYRINGE (ML) INJECTION EVERY 12 HOURS SCHEDULED
Status: DISCONTINUED | OUTPATIENT
Start: 2025-01-23 | End: 2025-01-23 | Stop reason: HOSPADM

## 2025-01-23 ASSESSMENT — PAIN - FUNCTIONAL ASSESSMENT: PAIN_FUNCTIONAL_ASSESSMENT: PREVENTS OR INTERFERES WITH MANY ACTIVE NOT PASSIVE ACTIVITIES

## 2025-01-23 NOTE — PLAN OF CARE
Problem: Pain  Goal: Verbalizes/displays adequate comfort level or baseline comfort level  Outcome: Completed     Problem: Safety - Adult  Goal: Free from fall injury  Outcome: Completed     Problem: ABCDS Injury Assessment  Goal: Absence of physical injury  Outcome: Completed      Secondary Defect Length In Cm (Required For Flaps): 0

## 2025-01-23 NOTE — PROGRESS NOTES
1126 Patient admitted to 2E02  Ambulatory for IR.  Patient NPO. Patient accompanied by self, states he will have a ride later and someone to stay with him.  Vital signs obtained.   Assessment and data collection intiated.   Oriented to room.  Policies and procedures for 2E explained.   All questions answered with no further questions at this time.   Fall prevention and safety precautions discussed with patient.   Large irregular scab noted, back of left calf, aprox 3 and 1/2 inches by 1 inch.   States calf pain usually a 7 with burning.     1127 Care plan reviewed with patient .  Patient  verbalize understanding of the plan of care and contribute to goal setting.       1205 Dr Vallecillo called and message left on his phone to return call. INR, hgb. Hct. Bun, creat and GFR given on message.   1230 patient asking for tabacco, instructed him not allowed, reassurance given.   1256 Potassium 5.7, Dr Vallecillo again called and message left on phone.    1305 Spoke with Dr Vallecillo thru surgery, wants patient to reschedule thru office and stop coumadin 5 days before, patient voices understanding.     1330 Patient discharged per wheelchair, states he will call office to reschedule after he speaks with Dr Guzmán, patient aware to hold coumadin 5 days before. S on with patient.

## 2025-01-24 ENCOUNTER — TELEPHONE (OUTPATIENT)
Dept: FAMILY MEDICINE CLINIC | Age: 60
End: 2025-01-24

## 2025-01-24 ENCOUNTER — OFFICE VISIT (OUTPATIENT)
Dept: FAMILY MEDICINE CLINIC | Age: 60
End: 2025-01-24

## 2025-01-24 VITALS
HEIGHT: 74 IN | RESPIRATION RATE: 16 BRPM | HEART RATE: 72 BPM | WEIGHT: 278.1 LBS | SYSTOLIC BLOOD PRESSURE: 138 MMHG | BODY MASS INDEX: 35.69 KG/M2 | DIASTOLIC BLOOD PRESSURE: 70 MMHG

## 2025-01-24 DIAGNOSIS — J96.01 ACUTE HYPOXEMIC RESPIRATORY FAILURE: ICD-10-CM

## 2025-01-24 DIAGNOSIS — I73.9 PAD (PERIPHERAL ARTERY DISEASE) (HCC): ICD-10-CM

## 2025-01-24 DIAGNOSIS — K86.0 ALCOHOL-INDUCED CHRONIC PANCREATITIS (HCC): ICD-10-CM

## 2025-01-24 DIAGNOSIS — I48.0 PAROXYSMAL ATRIAL FIBRILLATION (HCC): ICD-10-CM

## 2025-01-24 DIAGNOSIS — D33.4 SCHWANNOMA OF SPINAL CORD (HCC): ICD-10-CM

## 2025-01-24 DIAGNOSIS — R23.8 REDNESS AND SWELLING OF ANKLE: ICD-10-CM

## 2025-01-24 DIAGNOSIS — F10.231 ALCOHOL DEPENDENCE WITH WITHDRAWAL DELIRIUM (HCC): ICD-10-CM

## 2025-01-24 DIAGNOSIS — I70.242 ATHEROSCLEROSIS OF NATIVE ARTERIES OF LEFT LEG WITH ULCERATION OF CALF (HCC): ICD-10-CM

## 2025-01-24 DIAGNOSIS — L08.9 SOFT TISSUE INFECTION: Primary | ICD-10-CM

## 2025-01-24 DIAGNOSIS — M25.473 REDNESS AND SWELLING OF ANKLE: ICD-10-CM

## 2025-01-24 DIAGNOSIS — I50.23 ACUTE ON CHRONIC SYSTOLIC CHF (CONGESTIVE HEART FAILURE) (HCC): ICD-10-CM

## 2025-01-24 RX ORDER — CEPHALEXIN 500 MG/1
500 CAPSULE ORAL 3 TIMES DAILY
Qty: 30 CAPSULE | Refills: 0 | Status: SHIPPED | OUTPATIENT
Start: 2025-01-24 | End: 2025-02-03

## 2025-01-24 RX ORDER — MUPIROCIN 20 MG/G
OINTMENT TOPICAL
Qty: 30 G | Refills: 0 | Status: SHIPPED | OUTPATIENT
Start: 2025-01-24 | End: 2025-01-31

## 2025-01-24 SDOH — ECONOMIC STABILITY: FOOD INSECURITY: WITHIN THE PAST 12 MONTHS, YOU WORRIED THAT YOUR FOOD WOULD RUN OUT BEFORE YOU GOT MONEY TO BUY MORE.: NEVER TRUE

## 2025-01-24 SDOH — ECONOMIC STABILITY: FOOD INSECURITY: WITHIN THE PAST 12 MONTHS, THE FOOD YOU BOUGHT JUST DIDN'T LAST AND YOU DIDN'T HAVE MONEY TO GET MORE.: NEVER TRUE

## 2025-01-24 ASSESSMENT — PATIENT HEALTH QUESTIONNAIRE - PHQ9
SUM OF ALL RESPONSES TO PHQ QUESTIONS 1-9: 1
7. TROUBLE CONCENTRATING ON THINGS, SUCH AS READING THE NEWSPAPER OR WATCHING TELEVISION: NOT AT ALL
2. FEELING DOWN, DEPRESSED OR HOPELESS: NOT AT ALL
SUM OF ALL RESPONSES TO PHQ9 QUESTIONS 1 & 2: 0
SUM OF ALL RESPONSES TO PHQ QUESTIONS 1-9: 1
3. TROUBLE FALLING OR STAYING ASLEEP: NOT AT ALL
SUM OF ALL RESPONSES TO PHQ QUESTIONS 1-9: 1
8. MOVING OR SPEAKING SO SLOWLY THAT OTHER PEOPLE COULD HAVE NOTICED. OR THE OPPOSITE, BEING SO FIGETY OR RESTLESS THAT YOU HAVE BEEN MOVING AROUND A LOT MORE THAN USUAL: NOT AT ALL
9. THOUGHTS THAT YOU WOULD BE BETTER OFF DEAD, OR OF HURTING YOURSELF: NOT AT ALL
1. LITTLE INTEREST OR PLEASURE IN DOING THINGS: NOT AT ALL
4. FEELING TIRED OR HAVING LITTLE ENERGY: SEVERAL DAYS
10. IF YOU CHECKED OFF ANY PROBLEMS, HOW DIFFICULT HAVE THESE PROBLEMS MADE IT FOR YOU TO DO YOUR WORK, TAKE CARE OF THINGS AT HOME, OR GET ALONG WITH OTHER PEOPLE: NOT DIFFICULT AT ALL
SUM OF ALL RESPONSES TO PHQ QUESTIONS 1-9: 1
5. POOR APPETITE OR OVEREATING: NOT AT ALL

## 2025-01-24 ASSESSMENT — ENCOUNTER SYMPTOMS
NAUSEA: 0
COUGH: 0
BACK PAIN: 1
SHORTNESS OF BREATH: 0
ABDOMINAL PAIN: 0

## 2025-01-24 NOTE — TELEPHONE ENCOUNTER
Any late afternoon is fine - I thought they only seen patients on Tuesday.  Any day, late afternoon.

## 2025-01-24 NOTE — TELEPHONE ENCOUNTER
I phoned Nila with [facility] [phone] says the first appt they have is Monday 1/27 at 8:15am. If pt is only able to do an afternoon, this appt would not be until Friday 1/31 or Monday 2/3. Per Leonel CROCKETT, he highly encourages pt to do the Monday morning appt.    I called pt and notified him of availability and Leonel's recommendation. Pt says he cannot take off work anymore days but would try to work something out for Monday. Pt says he has [facility] number and will call them to set up the appt. Pt declined me calling them back to set the appt up.

## 2025-01-24 NOTE — TELEPHONE ENCOUNTER
Just wanted to let you know patient was scheduled for Monday morning. Also, we are here in wound clinic every Monday- Friday 8-430. We just have different providers each day and sometimes their schedule is changed at times and that will affect our availability. Tuesdays at this time are only half days with a provider and that rotates each week whether it is morning or afternoon clinic based on his availability.

## 2025-01-24 NOTE — PROGRESS NOTES
are equal, round, and reactive to light.   Cardiovascular:      Rate and Rhythm: Normal rate and regular rhythm.      Heart sounds: No murmur heard.  Pulmonary:      Effort: Pulmonary effort is normal.      Breath sounds: Normal breath sounds. No wheezing.   Abdominal:      General: Bowel sounds are normal. There is no distension.      Palpations: Abdomen is soft.      Tenderness: There is no abdominal tenderness.   Musculoskeletal:      Cervical back: Normal range of motion and neck supple.      Lumbar back: Tenderness and bony tenderness present. Decreased range of motion.        Legs:    Skin:     General: Skin is warm and dry.      Findings: No rash.          Assessment:       Diagnosis Orders   1. Soft tissue infection  mupirocin (BACTROBAN) 2 % ointment    cephALEXin (KEFLEX) 500 MG capsule    [facility]      2. Redness and swelling of ankle  mupirocin (BACTROBAN) 2 % ointment    cephALEXin (KEFLEX) 500 MG capsule    [facility]      3. 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      4. PAD (peripheral artery disease) ([facility])  mupirocin (BACTROBAN) 2 % ointment    cephALEXin (KEFLEX) 500 MG capsule    [facility]      5. Alcohol dependence with withdrawal delirium ([facility])        6. Acute hypoxemic respiratory failure        7. Acute on chronic systolic CHF (congestive heart failure) ([facility])        8. Alcohol-induced chronic pancreatitis ([facility])        9. Paroxysmal atrial fibrillation ([facility])        10. Schwannoma of spinal cord ([facility])                      Plan:      Appears cellulitis changes around wound vs inflammatory from PAD  ATB Oral and Topical as directed  Refer to WOUND CLINIC   - will be seen 1/27/25 in AM  Follow up with [facility]  RTO PRN      Current Outpatient Medications   Medication

## 2025-01-24 NOTE — TELEPHONE ENCOUNTER
Called wound clinic to schedule patient asap. Patient preferred Tuesday late afternoon.   Wound clinic doesn't see patients on Tuesday's.     Nurse work the referral. She will have the nurses call the office to schedule patient asap.

## 2025-01-24 NOTE — TELEPHONE ENCOUNTER
Per [facility] pt is now scheduled for a NP appt with Wound Care Monday 1/27/25 at 8:15am.

## 2025-01-24 NOTE — PATIENT INSTRUCTIONS
You may receive a survey about your visit with us today. The feedback from our patients helps us identify what is working well and where the service to all patients can be enhanced. Thank you!     Tobacco Cessation Programs     Telephonic behavior modification  1-800-QUIT-NOW (353-0304)  Counseling service for those who are ready to quit using tobacco.    Available for uninsured [place] residents, Medicaid recipients, pregnant women, or patients whose health plans or employers are members of the [place] [employer]    Online behavior modification  http://[place].Quitlogix.org  Online support program to help patients through each step of the quitting process.  Available 24 hours a day 7 days a week.  Provides up to date researched based tool, step-by-step guides, and motivational messages.    Online behavior modification  www.lungusa.org/stop-smoking/how-to-quit  HelpLine: 1-800-LUNG-USA (087-3866)  Email questions to:  questions@[facility].org   Website offers resources to help tobacco users figure out their reasons for quitting and then take the big step of quitting for good.    Hypnosis  Location: [address] [place] [address]  Contact: Robert Marie, PhD at [phone]  Hypnosis for tobacco cessation  Cost $225 for the initial session and $175 for each session afterwards.  Most patients require 6-8 sessions.  There is the option to submit through the patient’s insurance.    Hypnosis and behavior modification  Location: [address] [place] [address]  Contact: Romie Ayala, PhD at [phone]  Counseling and hypnosis for nicotine addition  Cost: For uninsured patients:  Please call above phone number  Cost for insured patients depends on patient’s insurance plan.    Behavior modification  Location: [facility], [address] [place] [address]  Contact: [phone]  Services include four one-on-one appointments between the patient and a respiratory therapist.  The four appointments

## 2025-01-24 NOTE — PROGRESS NOTES
1200: Report called to ISMAEL Guaman at [facility] at this time. Pt. To MONI at 1300     Pt arrived to unit in [facility] via transport. Pt orient to staff, unit, room and call light. Admission and assessment completed as charted. Pt alert and orient agitated but cooperative. Vital signs complete. Nitro gtt increased. Writer called pharmacy to request medication. Pt resting in bed with call light in reach. Safety maintained. Pt is able to make needs known. No signs of distress observed at this time will continue to monitor.

## 2025-01-27 ENCOUNTER — HOSPITAL ENCOUNTER (OUTPATIENT)
Dept: WOUND CARE | Age: 60
Discharge: HOME | End: 2025-01-27
Attending: NURSE PRACTITIONER
Payer: COMMERCIAL

## 2025-01-27 VITALS
TEMPERATURE: 97.5 F | DIASTOLIC BLOOD PRESSURE: 78 MMHG | SYSTOLIC BLOOD PRESSURE: 171 MMHG | RESPIRATION RATE: 16 BRPM | OXYGEN SATURATION: 97 % | HEART RATE: 70 BPM

## 2025-01-27 PROCEDURE — 99213 OFFICE O/P EST LOW 20 MIN: CPT

## 2025-01-27 ASSESSMENT — PAIN DESCRIPTION - PAIN TYPE: TYPE: ACUTE PAIN

## 2025-01-27 ASSESSMENT — PAIN - FUNCTIONAL ASSESSMENT: PAIN_FUNCTIONAL_ASSESSMENT: PREVENTS OR INTERFERES SOME ACTIVE ACTIVITIES AND ADLS

## 2025-01-27 ASSESSMENT — PAIN DESCRIPTION - LOCATION: LOCATION: LEG

## 2025-01-27 ASSESSMENT — PAIN DESCRIPTION - DESCRIPTORS: DESCRIPTORS: BURNING

## 2025-01-27 ASSESSMENT — PAIN DESCRIPTION - ORIENTATION: ORIENTATION: LEFT

## 2025-01-27 NOTE — PATIENT INSTRUCTIONS
Visit Discharge/Physician Orders:  - Elevate left lower leg periodically throughout the day to decrease swelling.   - Try to stop tobacco use to help improve blood flow and help with wound healing.   - Shower with dressing in place. Remove dressing after and apply new dressing.  - Dressing supplies ordered through [employer] and delivered to your home.    Wound Location: Left leg    Dressing orders:     1) Gather wound care supplies and arrange on clean table.     2) Wash your hands with soap and water or use alcohol based hand  for 20 seconds (sing \"Happy Birthday\" twice).    3) Left leg- Apply betadine to wound. Cover with Excel silicone bordered dressing. Change daily.     Keep all dressings clean & dry.    Follow up visit: 2 Weeks (Eliezer's assistant will call you with an appt at [facility] so you can be scheduled in the afternoon)     Supplies:  - Mechanical Debridement was completed today by using a saline and gauze.     Duration of dressings: 30 days    We have sent your supply order to the following company: [employer] Phone # [phone]   If you don't receive the items you were expecting or don't know what the items are that you received, call the company where the order was sent.   If you are unable to obtain wound supplies, continue to use the supplies you have available until you are able to reach us. It is most important to keep the wound covered at all times.  It is YOUR responsibility to make sure that supplies are re-ordered before you run out. Re-order telephone numbers are included in each package.     Keep next scheduled appointment. Please give 24 hour notice if unable to keep appointment. [phone]    If you experience any of the following, please call the Wound Care Service during business hours: Monday through Friday 8:00 am - 4:30 pm  ([phone]).   *Increase in pain   *Temperature over 101   *Increase in drainage from your wound or a foul odor   *Uncontrolled swelling   *Need for

## 2025-01-27 NOTE — PLAN OF CARE
Problem: Wound:  Goal: Will show signs of wound healing; wound closure and no evidence of infection  Description: Will show signs of wound healing; wound closure and no evidence of infection  Outcome: Progressing   Patient presents to wound clinic for evaluation and treatment of left leg wound. Discharge instructions/orders per AVS. Follow up appointment to be scheduled at [facility] in 2 weeks.    Care plan reviewed with patient.  Patient verbalize understanding of the plan of care and contribute to goal setting.

## 2025-01-27 NOTE — CONSULTS
[facility]         Consult and Procedure Note      Garrett Duncan  MEDICAL RECORD NUMBER:  [id number]  AGE: 59 y.o.   GENDER: male  : 1965  EPISODE DATE:  2025    Subjective:     Chief Complaint   Patient presents with    Wound Check     Left leg         HISTORY of PRESENT ILLNESS HPI     Garrett Duncan is a 59 y.o. male who presents for initial evaluation today for an ongoing wound to the posterior aspect of the left calf.  Patient states he is unsure on how this developed but has had the multiple falls over the last couple of weeks and does believe that it was probably trauma induced.  Patient still has some bruising to the site.  Stable scabbing noted to the posterior left calf.  Patient does have significant known peripheral arterial disease with venous insufficiency.  Patient is awaiting revascularization of the left lower extremity.  Patient was previously scheduled to have this done, but was canceled due to his INR.  Patient is to stop his Coumadin 5 days prior to his next revascularization scheduled date.  Ulcer appears to be arterial related.  Ulcer today was painted with Betadine and dressed with a silicone bordered foam dressing.  Patient does have some pitting edema noted to the left lower extremity.  Due to significant vascular disease we did not compress the leg.  Patient was encouraged to elevate his lower extremities while at rest.  Patient will follow back up with me at [facility] due to the fact that he needs later afternoon appointments.  Patient will follow up with the wound center as needed.    History of Wound Context: Trauma induced wound to the posterior aspect of the left calf.  Wound/Ulcer Pain Timing/Severity: mild  Quality of pain: tender, pressure  Severity:  6 / 10   Modifying Factors: Pain worsens with walking  Associated Signs/Symptoms: edema, erythema, and pain        PAST MEDICAL HISTORY        Diagnosis Date    Acute systolic CHF (congestive heart

## 2025-01-27 NOTE — PROGRESS NOTES
Description not for Pressure Injury Full thickness 01/27/25 0831   Number of days: 0          Supplies Requested :        *DISPENSE AS WRITTEN*    WOUND #: 1   PRIMARY DRESSING:  Other:   Excel Daily SAP 4\" x 4\" - Dispense as written           FREQUENCY OF DRESSING CHANGES:  Daily       ADDITIONAL ITEMS:  [] Gloves Small  [x] Gloves Medium [] Gloves Large [] Gloves XLarge  [] Tape 1\" [] Tape 2\" [] Tape 3\"  [] Medipore Tape  [] Saline  [x] Skin Prep   [] Adhesive Remover   [x] Cotton Tip Applicators   [] Other:    Patient Wound(s) Debrided: [x] Yes 1/27/25   [] No    Debridement Type: Mechanical     Patient currently being seen by Home Health: [] Yes   [x] No    Duration for needed supplies:  []15  [x]30  []60  []90 Days        Electronically signed by Li Alcazar RN on 1/27/2025 at 10:49 AM     Provider Information:      PROVIDER'S NAME: Eliezer Jones CNP  NPI: [id number]

## 2025-01-28 DIAGNOSIS — E11.22 CONTROLLED TYPE 2 DIABETES MELLITUS WITH CHRONIC KIDNEY DISEASE, WITHOUT LONG-TERM CURRENT USE OF INSULIN, UNSPECIFIED CKD STAGE (HCC): ICD-10-CM

## 2025-01-28 RX ORDER — BLOOD SUGAR DIAGNOSTIC
1 STRIP MISCELLANEOUS 2 TIMES DAILY
Qty: 100 EACH | Refills: 3 | Status: SHIPPED | OUTPATIENT
Start: 2025-01-28

## 2025-01-28 NOTE — TELEPHONE ENCOUNTER
Patient requesting refill of One Touch Ultra test strips to [facility] on [address].  Please refill if appropriate.  Will check with pharmacy after 2pm

## 2025-01-30 ENCOUNTER — TELEPHONE (OUTPATIENT)
Dept: FAMILY MEDICINE CLINIC | Age: 60
End: 2025-01-30

## 2025-01-30 NOTE — TELEPHONE ENCOUNTER
Patient calling due to his vascular surgery has been R/S x 2 due to him \"taking his Coumadin as he was directed and them deciding that day that I shouldn't.  Also my INR was high the last time.  Now I'm not scheduled until 3/5/25 and I dont' know if I'm going to make it until then do to this pain.  I've used a week of vacation already and I can't go on FMLA because I won't get paid.  I might need to go on medical leave.\"  Advised to talk to vascular surgeon regarding to see if they can write for medical leave.  If not, to call the office back and we can check with provider.  He voices understanding.

## 2025-02-05 ENCOUNTER — TELEPHONE (OUTPATIENT)
Dept: FAMILY MEDICINE CLINIC | Age: 60
End: 2025-02-05

## 2025-02-05 NOTE — TELEPHONE ENCOUNTER
Patient called in. Stated you have been helping with the back of his leg. He wanted to let you know that the stent has been delayed until March 6th. He is worried about it getting infected again. Wasn't sure if he should have more antibiotics called in or what your thoughts are on this? Please advise.

## 2025-02-07 ENCOUNTER — TELEPHONE (OUTPATIENT)
Dept: WOUND CARE | Age: 60
End: 2025-02-07

## 2025-02-07 NOTE — TELEPHONE ENCOUNTER
Garrett contacted the office, he states that his vascular surgery has been pushed out to March 6th due to INR levels, he is concerned about infection to his leg wound, states he is not experiencing any new redness, drainage or fevers. Eliezer Jones notified at this time, he states he will send in Doxycycline to [facility] on [address] and will re-evaluate patient on 2/12 at his scheduled appointment at [facility]. Patient notified at this time and voices good understanding, has no further questions at this time.

## 2025-02-10 ENCOUNTER — OFFICE VISIT (OUTPATIENT)
Dept: NEPHROLOGY | Age: 60
End: 2025-02-10
Payer: COMMERCIAL

## 2025-02-10 ENCOUNTER — TELEPHONE (OUTPATIENT)
Dept: CARDIOLOGY CLINIC | Age: 60
End: 2025-02-10

## 2025-02-10 VITALS
WEIGHT: 279 LBS | DIASTOLIC BLOOD PRESSURE: 80 MMHG | HEART RATE: 67 BPM | BODY MASS INDEX: 35.82 KG/M2 | SYSTOLIC BLOOD PRESSURE: 138 MMHG | OXYGEN SATURATION: 97 %

## 2025-02-10 DIAGNOSIS — N25.81 SECONDARY HYPERPARATHYROIDISM (HCC): ICD-10-CM

## 2025-02-10 DIAGNOSIS — E11.21 DIABETIC NEPHROPATHY ASSOCIATED WITH TYPE 2 DIABETES MELLITUS (HCC): ICD-10-CM

## 2025-02-10 DIAGNOSIS — N18.4 CKD (CHRONIC KIDNEY DISEASE) STAGE 4, GFR 15-29 ML/MIN (HCC): Primary | ICD-10-CM

## 2025-02-10 LAB
ALBUMIN SERPL BCG-MCNC: 4.1 G/DL (ref 3.5–5.1)
ALP SERPL-CCNC: 73 U/L (ref 38–126)
ALT SERPL W/O P-5'-P-CCNC: 10 U/L (ref 11–66)
ANION GAP SERPL CALC-SCNC: 21 MEQ/L (ref 8–16)
AST SERPL-CCNC: 16 U/L (ref 5–40)
BILIRUB SERPL-MCNC: 0.6 MG/DL (ref 0.3–1.2)
BUN SERPL-MCNC: 53 MG/DL (ref 7–22)
CALCIUM SERPL-MCNC: 9.3 MG/DL (ref 8.5–10.5)
CHLORIDE SERPL-SCNC: 101 MEQ/L (ref 98–111)
CO2 SERPL-SCNC: 17 MEQ/L (ref 23–33)
CREAT SERPL-MCNC: 3.9 MG/DL (ref 0.4–1.2)
GFR SERPL CREATININE-BSD FRML MDRD: 17 ML/MIN/1.73M2
GLUCOSE SERPL-MCNC: 90 MG/DL (ref 70–108)
POTASSIUM SERPL-SCNC: 4.9 MEQ/L (ref 3.5–5.2)
PROT SERPL-MCNC: 6.9 G/DL (ref 6.1–8)
SODIUM SERPL-SCNC: 139 MEQ/L (ref 135–145)

## 2025-02-10 PROCEDURE — 3075F SYST BP GE 130 - 139MM HG: CPT | Performed by: INTERNAL MEDICINE

## 2025-02-10 PROCEDURE — G2211 COMPLEX E/M VISIT ADD ON: HCPCS | Performed by: INTERNAL MEDICINE

## 2025-02-10 PROCEDURE — 3079F DIAST BP 80-89 MM HG: CPT | Performed by: INTERNAL MEDICINE

## 2025-02-10 PROCEDURE — 99213 OFFICE O/P EST LOW 20 MIN: CPT | Performed by: INTERNAL MEDICINE

## 2025-02-10 NOTE — PROGRESS NOTES
[facility]        Outpatient Follow-Up note         2/10/2025 3:06 PM    Patient Name:   Garrett Duncan  YOB: 1965  Primary Care Physician:  Leonel Adorno APRN - [facility]     Chief Complaint / Reason for follow-up : Follow Up of CKD      Interval History :  Patient seen and examined by me. No cp or SOB.  Overall doing ok.  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     Intradural extramedullary spinal tumor     Long term (current) use of anticoagulants 08/29/2019    Medtronic dual icd      Neuromuscular disorder ([facility])     Paroxysmal atrial fibrillation ([facility]) 07/22/2014    Prolonged emergence from general anesthesia     from CABG surgery    Severe obesity (BMI 35.0-39.9) with comorbidity 05/22/2023    V-tach ([facility])     s/p ablation in dec 2014     Past Surgical History:   Procedure Laterality Date    CARDIAC CATHETERIZATION  03/20/2014    [facility]    CARDIAC DEFIBRILLATOR PLACEMENT  10/13/2015    MEDTRONIC [place], MRI CONDITIONAL ICD    CARDIAC ELECTROPHYSIOLOGY STUDY AND ABLATION      CORONARY ARTERY BYPASS GRAFT  05/09/2014    3 bypass    EKG 12-LEAD  07/17/2015         OTHER SURGICAL HISTORY Left 10/21/2014    Left Bursectomy, I & D Left Elbow - Dr. Garduno    PACEMAKER PLACEMENT      PACER INTERROGATE  3/8/2024    MT LAMINECTOMY W/O FFD > 2 VERT SEG LUMBAR N/A 01/16/2018    LUMBAR AND SACRAL LAMINECTOMY, REMOVAL OF INTRASPINAL TUMORS performed by Burke

## 2025-02-11 ENCOUNTER — LAB (OUTPATIENT)
Dept: LAB | Age: 60
End: 2025-02-11

## 2025-02-11 ENCOUNTER — TELEPHONE (OUTPATIENT)
Dept: FAMILY MEDICINE CLINIC | Age: 60
End: 2025-02-11

## 2025-02-11 ENCOUNTER — PREP FOR PROCEDURE (OUTPATIENT)
Dept: CARDIOTHORACIC SURGERY | Age: 60
End: 2025-02-11

## 2025-02-11 DIAGNOSIS — E11.22 CONTROLLED TYPE 2 DIABETES MELLITUS WITH CHRONIC KIDNEY DISEASE, WITHOUT LONG-TERM CURRENT USE OF INSULIN, UNSPECIFIED CKD STAGE (HCC): ICD-10-CM

## 2025-02-11 DIAGNOSIS — E11.22 CONTROLLED TYPE 2 DIABETES MELLITUS WITH CHRONIC KIDNEY DISEASE, WITHOUT LONG-TERM CURRENT USE OF INSULIN, UNSPECIFIED CKD STAGE (HCC): Primary | ICD-10-CM

## 2025-02-11 LAB
DEPRECATED MEAN GLUCOSE BLD GHB EST-ACNC: 102 MG/DL (ref 70–126)
HBA1C MFR BLD HPLC: 5.4 % (ref 4.4–6.4)

## 2025-02-11 RX ORDER — SODIUM CHLORIDE 0.9 % (FLUSH) 0.9 %
5-40 SYRINGE (ML) INJECTION PRN
Status: CANCELLED | OUTPATIENT
Start: 2025-02-11

## 2025-02-11 RX ORDER — SODIUM CHLORIDE 9 MG/ML
INJECTION, SOLUTION INTRAVENOUS CONTINUOUS
Status: CANCELLED | OUTPATIENT
Start: 2025-02-11

## 2025-02-11 RX ORDER — SODIUM CHLORIDE 9 MG/ML
INJECTION, SOLUTION INTRAVENOUS PRN
Status: CANCELLED | OUTPATIENT
Start: 2025-02-11

## 2025-02-11 RX ORDER — SODIUM CHLORIDE 0.9 % (FLUSH) 0.9 %
5-40 SYRINGE (ML) INJECTION EVERY 12 HOURS SCHEDULED
Status: CANCELLED | OUTPATIENT
Start: 2025-02-11

## 2025-02-11 NOTE — TELEPHONE ENCOUNTER
Was taken off water pill  Started \"retaining fluid\"  Took it for 5 days  Saw harry yesterday and was told to stop  He is waiting on stent in leg  Feels like retaining some fluid

## 2025-02-11 NOTE — TELEPHONE ENCOUNTER
Pt called office requesting a copy of his last A1c result to submit to work. Advised pt last A1c I have is from 6/13/24. Pt voiced understanding and will stop by the office to  later today. Copy filed at the front window.

## 2025-02-11 NOTE — TELEPHONE ENCOUNTER
Pt called office back stating he is still going to  the result from June for the A1c. But he is asking for a new order to be placed in Epic and he will go to [facility] after 1pm today to have a more recent A1C drawn.

## 2025-02-17 RX ORDER — CALCITRIOL 0.25 UG/1
0.25 CAPSULE, LIQUID FILLED ORAL DAILY
Qty: 30 CAPSULE | Refills: 5 | Status: SHIPPED | OUTPATIENT
Start: 2025-02-17

## 2025-02-19 ENCOUNTER — TELEPHONE (OUTPATIENT)
Age: 60
End: 2025-02-19

## 2025-02-19 ENCOUNTER — TELEPHONE (OUTPATIENT)
Dept: FAMILY MEDICINE CLINIC | Age: 60
End: 2025-02-19

## 2025-02-19 DIAGNOSIS — F41.3 OTHER MIXED ANXIETY DISORDERS: ICD-10-CM

## 2025-02-19 RX ORDER — ALPRAZOLAM 0.5 MG
TABLET ORAL
Qty: 30 TABLET | Refills: 5 | Status: SHIPPED | OUTPATIENT
Start: 2025-02-19 | End: 2025-08-18

## 2025-02-19 RX ORDER — FERROUS SULFATE 325(65) MG
325 TABLET ORAL
Qty: 270 TABLET | Refills: 3 | Status: SHIPPED | OUTPATIENT
Start: 2025-02-19

## 2025-02-19 NOTE — TELEPHONE ENCOUNTER
3/5/25 Stent being placed on back of leg. Open sore on back of leg. Questioning if you could place him on an 8 hour work restriction. Will just need a work slip if able. Will  note from the office if you're able to write it.  Please advise.     Also requesting refills on alprazolam and ferrous sulfate three times daily.

## 2025-02-19 NOTE — TELEPHONE ENCOUNTER
Pt called, is scheduled on 3/5/2025 for an IR Angiogram. Pt says he has an open sore on the back of his left leg. Pt said he is having trouble walking from it, has been to the wound clinic and they told him the sore is from lack of circulation. Pt is wondering if he can be taken off of work from 03/03/2025-03/17/2025 so he can rest his leg and it can heal. Please advise.

## 2025-02-23 RX ORDER — AMIODARONE HYDROCHLORIDE 200 MG/1
200 TABLET ORAL DAILY
Qty: 90 TABLET | Refills: 3 | Status: SHIPPED | OUTPATIENT
Start: 2025-02-23

## 2025-02-26 RX ORDER — METOPROLOL SUCCINATE 25 MG/1
25 TABLET, EXTENDED RELEASE ORAL DAILY
Qty: 90 TABLET | Refills: 1 | Status: SHIPPED | OUTPATIENT
Start: 2025-02-26

## 2025-03-03 RX ORDER — ISOSORBIDE MONONITRATE 30 MG/1
30 TABLET, EXTENDED RELEASE ORAL DAILY
Qty: 90 TABLET | Refills: 0 | Status: SHIPPED | OUTPATIENT
Start: 2025-03-03

## 2025-03-03 NOTE — TELEPHONE ENCOUNTER
Garrett Duncan called requesting a refill on the following medications:  Requested Prescriptions     Pending Prescriptions Disp Refills    isosorbide mononitrate (IMDUR) 30 MG extended release tablet 90 tablet 1     Sig: Take 1 tablet by mouth daily     Pharmacy verified:[facility]   .      Date of last visit: 6/13/23   Date of next visit (if applicable): 5/8/2025                            Yes

## 2025-03-05 ENCOUNTER — HOSPITAL ENCOUNTER (OUTPATIENT)
Dept: INTERVENTIONAL RADIOLOGY/VASCULAR | Age: 60
Discharge: HOME | End: 2025-03-05
Attending: SURGERY | Admitting: SURGERY
Payer: COMMERCIAL

## 2025-03-05 VITALS
HEIGHT: 74 IN | HEART RATE: 63 BPM | SYSTOLIC BLOOD PRESSURE: 176 MMHG | RESPIRATION RATE: 16 BRPM | OXYGEN SATURATION: 96 % | TEMPERATURE: 98.6 F | BODY MASS INDEX: 35.65 KG/M2 | DIASTOLIC BLOOD PRESSURE: 84 MMHG | WEIGHT: 277.78 LBS

## 2025-03-05 LAB
ABO GROUP BLD: NORMAL
ANION GAP SERPL CALC-SCNC: 14 MEQ/L (ref 8–16)
BUN SERPL-MCNC: 42 MG/DL (ref 8–23)
CALCIUM SERPL-MCNC: 8.9 MG/DL (ref 8.8–10.2)
CHLORIDE SERPL-SCNC: 108 MEQ/L (ref 98–111)
CO2 SERPL-SCNC: 16 MEQ/L (ref 22–29)
CREAT SERPL-MCNC: 4.3 MG/DL (ref 0.7–1.2)
DEPRECATED RDW RBC AUTO: 58.4 FL (ref 35–45)
ERYTHROCYTE [DISTWIDTH] IN BLOOD BY AUTOMATED COUNT: 15 % (ref 11.5–14.5)
GFR SERPL CREATININE-BSD FRML MDRD: 15 ML/MIN/1.73M2
GLUCOSE SERPL-MCNC: 105 MG/DL (ref 74–109)
HCT VFR BLD AUTO: 28.8 % (ref 42–52)
HGB BLD-MCNC: 9 GM/DL (ref 14–18)
IAT IGG-SP REAG SERPL QL: NORMAL
INR PPP: 1.42 (ref 0.85–1.13)
MCH RBC QN AUTO: 33.2 PG (ref 26–33)
MCHC RBC AUTO-ENTMCNC: 31.3 GM/DL (ref 32.2–35.5)
MCV RBC AUTO: 106.3 FL (ref 80–94)
PLATELET # BLD AUTO: 144 THOU/MM3 (ref 130–400)
PMV BLD AUTO: 10 FL (ref 9.4–12.4)
POTASSIUM SERPL-SCNC: 4.9 MEQ/L (ref 3.5–5.2)
RBC # BLD AUTO: 2.71 MILL/MM3 (ref 4.7–6.1)
RH BLD: NORMAL
SODIUM SERPL-SCNC: 138 MEQ/L (ref 135–145)
WBC # BLD AUTO: 6.4 THOU/MM3 (ref 4.8–10.8)

## 2025-03-05 PROCEDURE — 85027 COMPLETE CBC AUTOMATED: CPT

## 2025-03-05 PROCEDURE — 86900 BLOOD TYPING SEROLOGIC ABO: CPT

## 2025-03-05 PROCEDURE — 85610 PROTHROMBIN TIME: CPT

## 2025-03-05 PROCEDURE — 2580000003 HC RX 258: Performed by: PHYSICIAN ASSISTANT

## 2025-03-05 PROCEDURE — APPSS60 APP SPLIT SHARED TIME 46-60 MINUTES: Performed by: PHYSICIAN ASSISTANT

## 2025-03-05 PROCEDURE — 86885 COOMBS TEST INDIRECT QUAL: CPT

## 2025-03-05 PROCEDURE — 80048 BASIC METABOLIC PNL TOTAL CA: CPT

## 2025-03-05 PROCEDURE — 86901 BLOOD TYPING SEROLOGIC RH(D): CPT

## 2025-03-05 PROCEDURE — 36415 COLL VENOUS BLD VENIPUNCTURE: CPT

## 2025-03-05 RX ORDER — SODIUM CHLORIDE 0.9 % (FLUSH) 0.9 %
5-40 SYRINGE (ML) INJECTION PRN
Status: DISCONTINUED | OUTPATIENT
Start: 2025-03-05 | End: 2025-03-05 | Stop reason: HOSPADM

## 2025-03-05 RX ORDER — ENOXAPARIN SODIUM 300 MG/3ML
120 INJECTION INTRAVENOUS; SUBCUTANEOUS DAILY
COMMUNITY

## 2025-03-05 RX ORDER — SODIUM CHLORIDE 0.9 % (FLUSH) 0.9 %
5-40 SYRINGE (ML) INJECTION EVERY 12 HOURS SCHEDULED
Status: DISCONTINUED | OUTPATIENT
Start: 2025-03-05 | End: 2025-03-05 | Stop reason: HOSPADM

## 2025-03-05 RX ORDER — SODIUM CHLORIDE 9 MG/ML
INJECTION, SOLUTION INTRAVENOUS PRN
Status: DISCONTINUED | OUTPATIENT
Start: 2025-03-05 | End: 2025-03-05 | Stop reason: HOSPADM

## 2025-03-05 RX ORDER — SODIUM CHLORIDE 9 MG/ML
INJECTION, SOLUTION INTRAVENOUS CONTINUOUS
Status: DISCONTINUED | OUTPATIENT
Start: 2025-03-05 | End: 2025-03-05 | Stop reason: HOSPADM

## 2025-03-05 RX ADMIN — SODIUM CHLORIDE: 9 INJECTION, SOLUTION INTRAVENOUS at 13:57

## 2025-03-05 NOTE — DISCHARGE INSTRUCTIONS
Patient to go to coumadin clinic tomorrow for management of coumadin. Continue taking Lovenox as directed and have coumadin clinic manage.   Our office will call you tomorrow to reschedule surgery.

## 2025-03-05 NOTE — FLOWSHEET NOTE
1200 Patient admitted to 2E15  Ambulatory for peripheral angiogram  Patient NPO. Patient accompanied by sister  Vital signs obtained.   Assessment and data collection intiated.   Oriented to room.  Policies and procedures for 2E explained.   All questions answered with no further questions at this time.   Fall precautions reviewed with patient.     1200 Care plan reviewed with patient and sister.  Patient and sister verbalize understanding of the plan of care and contribute to goal setting.         1338 Notified Dr Vallecillo through perfect serve regarding kidney function and drop in hemoglobin, along with wound on left calf.  Dr Vallecillo will call back.      1600 Attempted to call [facility] to get an update for patient, no response.    1700 Attemped to call [facility] to get an update for patient, spoke with Reta , no update at this time    1800 perfect served Dr Vallecillo and to send patient home and office will reschedule.     1830 Anna PA with Dr Vallecillo at bedside.  Patient is on coumadin and taking lovenox injections.  Anna is going over all instructions for patient regarding restarting his coumadin and lovenox going forward.  Anna is also discussing with patient when procedure will be rescheduled.     1920 Patient goals/plan/treatment preferences discussed by this RN.  Discharge planning provided by this RN.  Patient goals/plan/treatment preferences reviewed with patient/family.  Patient/family verbalize understanding of discharge plan and are in agreement with goal/plan/treatment preferences.  Understanding was demonstrated using the teach back method.  AVS provided by this RN at time of discharge, which includes all necessary medical information pertaining to the patients current course of illness, treatment, post-discharge goals of care, and treatment preferences.  Patient to report to coumadin clinic 3/6/25.

## 2025-03-05 NOTE — FLOWSHEET NOTE
03/05/25 1338   Treatment Team Notification   Reason for Communication Evaluate   Name of Team Member Notified Dr Vallecillo   Treatment Team Role Attending Provider   Method of Communication Call   Response Waiting for response   Notification Time 1338     Notified Dr Vallecillo through perfect serve.  Tomasa answered his phone, stated Dr Vallecillo is in surgery.  Will call back.  Called regarding abnormal lab values of kidney function and hemoglobin

## 2025-03-06 RX ORDER — SACUBITRIL AND VALSARTAN 24; 26 MG/1; MG/1
TABLET, FILM COATED ORAL
Qty: 180 TABLET | Refills: 1 | Status: SHIPPED | OUTPATIENT
Start: 2025-03-06
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
## 2025-03-10 ENCOUNTER — PREP FOR PROCEDURE (OUTPATIENT)
Dept: CARDIOTHORACIC SURGERY | Age: 60
End: 2025-03-10

## 2025-03-10 RX ORDER — SODIUM CHLORIDE 9 MG/ML
INJECTION, SOLUTION INTRAVENOUS CONTINUOUS
Status: CANCELLED | OUTPATIENT
Start: 2025-03-10

## 2025-03-10 RX ORDER — SODIUM CHLORIDE 0.9 % (FLUSH) 0.9 %
5-40 SYRINGE (ML) INJECTION PRN
Status: CANCELLED | OUTPATIENT
Start: 2025-03-10

## 2025-03-10 RX ORDER — SODIUM CHLORIDE 0.9 % (FLUSH) 0.9 %
5-40 SYRINGE (ML) INJECTION EVERY 12 HOURS SCHEDULED
Status: CANCELLED | OUTPATIENT
Start: 2025-03-10

## 2025-03-10 RX ORDER — SODIUM CHLORIDE 9 MG/ML
INJECTION, SOLUTION INTRAVENOUS PRN
Status: CANCELLED | OUTPATIENT
Start: 2025-03-10

## 2025-03-19 ENCOUNTER — LAB (OUTPATIENT)
Dept: LAB | Age: 60
End: 2025-03-19

## 2025-03-19 DIAGNOSIS — N25.81 SECONDARY HYPERPARATHYROIDISM: ICD-10-CM

## 2025-03-19 DIAGNOSIS — N18.4 CKD (CHRONIC KIDNEY DISEASE) STAGE 4, GFR 15-29 ML/MIN (HCC): ICD-10-CM

## 2025-03-19 DIAGNOSIS — E11.21 DIABETIC NEPHROPATHY ASSOCIATED WITH TYPE 2 DIABETES MELLITUS: ICD-10-CM

## 2025-03-19 LAB
25(OH)D3 SERPL-MCNC: 28 NG/ML (ref 30–100)
ALBUMIN SERPL BCG-MCNC: 3.7 G/DL (ref 3.4–4.9)
ALP SERPL-CCNC: 70 U/L (ref 40–129)
ALT SERPL W/O P-5'-P-CCNC: 11 U/L (ref 10–50)
ANION GAP SERPL CALC-SCNC: 13 MEQ/L (ref 8–16)
AST SERPL-CCNC: 17 U/L (ref 10–50)
BILIRUB SERPL-MCNC: 0.3 MG/DL (ref 0.3–1.2)
BUN SERPL-MCNC: 50 MG/DL (ref 8–23)
CALCIUM SERPL-MCNC: 8.5 MG/DL (ref 8.8–10.2)
CHLORIDE SERPL-SCNC: 106 MEQ/L (ref 98–111)
CO2 SERPL-SCNC: 15 MEQ/L (ref 22–29)
CREAT SERPL-MCNC: 5.2 MG/DL (ref 0.7–1.2)
DEPRECATED RDW RBC AUTO: 54.1 FL (ref 35–45)
ERYTHROCYTE [DISTWIDTH] IN BLOOD BY AUTOMATED COUNT: 14.2 % (ref 11.5–14.5)
GFR SERPL CREATININE-BSD FRML MDRD: 12 ML/MIN/1.73M2
GLUCOSE SERPL-MCNC: 103 MG/DL (ref 74–109)
HCT VFR BLD AUTO: 27.3 % (ref 42–52)
HGB BLD-MCNC: 8.6 GM/DL (ref 14–18)
MCH RBC QN AUTO: 33 PG (ref 26–33)
MCHC RBC AUTO-ENTMCNC: 31.5 GM/DL (ref 32.2–35.5)
MCV RBC AUTO: 104.6 FL (ref 80–94)
PHOSPHATE SERPL-MCNC: 4.6 MG/DL (ref 2.5–4.5)
PLATELET # BLD AUTO: 153 THOU/MM3 (ref 130–400)
PMV BLD AUTO: 10.3 FL (ref 9.4–12.4)
POTASSIUM SERPL-SCNC: 4.5 MEQ/L (ref 3.5–5.2)
PROT SERPL-MCNC: 6.6 G/DL (ref 6.4–8.3)
PTH-INTACT SERPL-MCNC: 94 PG/ML (ref 15–65)
RBC # BLD AUTO: 2.61 MILL/MM3 (ref 4.7–6.1)
SODIUM SERPL-SCNC: 134 MEQ/L (ref 135–145)
WBC # BLD AUTO: 6.4 THOU/MM3 (ref 4.8–10.8)

## 2025-03-20 ENCOUNTER — TELEPHONE (OUTPATIENT)
Dept: FAMILY MEDICINE CLINIC | Age: 60
End: 2025-03-20

## 2025-03-20 DIAGNOSIS — R19.7 DIARRHEA, UNSPECIFIED TYPE: Primary | ICD-10-CM
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
## 2025-03-24 ENCOUNTER — LAB (OUTPATIENT)
Dept: LAB | Age: 60
End: 2025-03-24

## 2025-03-24 ENCOUNTER — OFFICE VISIT (OUTPATIENT)
Dept: NEPHROLOGY | Age: 60
End: 2025-03-24
Payer: COMMERCIAL

## 2025-03-24 VITALS
DIASTOLIC BLOOD PRESSURE: 72 MMHG | WEIGHT: 288 LBS | SYSTOLIC BLOOD PRESSURE: 158 MMHG | HEART RATE: 66 BPM | BODY MASS INDEX: 36.98 KG/M2 | OXYGEN SATURATION: 96 %

## 2025-03-24 DIAGNOSIS — N18.4 CKD (CHRONIC KIDNEY DISEASE) STAGE 4, GFR 15-29 ML/MIN (HCC): Primary | ICD-10-CM

## 2025-03-24 DIAGNOSIS — N18.5 STAGE 5 CHRONIC KIDNEY DISEASE NOT ON CHRONIC DIALYSIS (HCC): Primary | ICD-10-CM

## 2025-03-24 DIAGNOSIS — N17.9 ACUTE RENAL FAILURE, UNSPECIFIED ACUTE RENAL FAILURE TYPE: ICD-10-CM

## 2025-03-24 DIAGNOSIS — E11.21 DIABETIC NEPHROPATHY ASSOCIATED WITH TYPE 2 DIABETES MELLITUS: ICD-10-CM

## 2025-03-24 DIAGNOSIS — I10 HTN (HYPERTENSION), BENIGN: ICD-10-CM

## 2025-03-24 DIAGNOSIS — R19.7 DIARRHEA, UNSPECIFIED TYPE: ICD-10-CM

## 2025-03-24 LAB
C CAYETANENSIS DNA SPEC QL NAA+PROBE: NOT DETECTED
CAMPY SP DNA.DIARRHEA STL QL NAA+PROBE: NOT DETECTED
CRYPTOSP DNA SPEC QL NAA+PROBE: NOT DETECTED
E COLI O157H7 DNA SPEC QL NAA+PROBE: ABNORMAL
E HISTOLYT DNA SPEC QL NAA+PROBE: NOT DETECTED
EAEC PAA PLAS AGGR+AATA ST NAA+NON-PRB: NOT DETECTED
EC STX1+STX2 + H7 FLIC SPEC NAA+PROBE: NOT DETECTED
EPEC EAE GENE STL QL NAA+NON-PROBE: NOT DETECTED
ETEC LTA+ST1A+ST1B TOX ST NAA+NON-PROBE: NOT DETECTED
G LAMBLIA DNA SPEC QL NAA+PROBE: NOT DETECTED
HADV DNA SPEC QL NAA+PROBE: DETECTED
HASTV RNA SPEC QL NAA+PROBE: NOT DETECTED
HBV SURFACE AB TITR SER: < 3.5 MIU/ML
HBV SURFACE AG SERPL QL IA: NONREACTIVE
NOROVIRUS GI + GII RNA STL NAA+PROBE: NOT DETECTED
P SHIGELLOIDES DNA STL QL NAA+PROBE: NOT DETECTED
RV RNA SPEC QL NAA+PROBE: NOT DETECTED
SALMONELLA DNA SPEC QL NAA+PROBE: NOT DETECTED
SAPOVIRUS RNA SPEC QL NAA+PROBE: NOT DETECTED
SHIGELLA SP+EIEC IPAH ST NAA+NON-PROBE: NOT DETECTED
V CHOLERAE DNA SPEC QL NAA+PROBE: NOT DETECTED
VIBRIO DNA SPEC NAA+PROBE: NOT DETECTED
Y ENTERO RECN STL QL NAA+PROBE: NOT DETECTED

## 2025-03-24 PROCEDURE — G2211 COMPLEX E/M VISIT ADD ON: HCPCS | Performed by: INTERNAL MEDICINE

## 2025-03-24 PROCEDURE — 3044F HG A1C LEVEL LT 7.0%: CPT | Performed by: INTERNAL MEDICINE

## 2025-03-24 PROCEDURE — 99215 OFFICE O/P EST HI 40 MIN: CPT | Performed by: INTERNAL MEDICINE

## 2025-03-24 PROCEDURE — 3078F DIAST BP <80 MM HG: CPT | Performed by: INTERNAL MEDICINE

## 2025-03-24 PROCEDURE — 3077F SYST BP >= 140 MM HG: CPT | Performed by: INTERNAL MEDICINE

## 2025-03-24 NOTE — PROGRESS NOTES
LAMINECTOMY, REMOVAL OF INTRASPINAL TUMORS performed by Burke Garcia MD at [facility] OR    VASCULAR SURGERY      cabg harvest from legs        Medications :     Outpatient Medications Marked as Taking for the 3/24/25 encounter (Office Visit) with Samuel Kathleen MD   Medication Sig Dispense Refill    ENTRESTO 24-26 MG per tablet TAKE 1 TABLET BY MOUTH TWICE DAILY. HOLD MORNING OF SURGERY 180 tablet 1    enoxaparin (LOVENOX) 300 MG/3ML injection Inject 120 mg/kg into the skin daily      isosorbide mononitrate (IMDUR) 30 MG extended release tablet Take 1 tablet by mouth daily 90 tablet 0    metoprolol succinate (TOPROL XL) 25 MG extended release tablet TAKE 1 TABLET BY MOUTH DAILY 90 tablet 1    amiodarone (CORDARONE) 200 MG tablet TAKE 1 TABLET BY MOUTH DAILY 90 tablet 3    ferrous sulfate (IRON 325) 325 (65 Fe) MG tablet Take 1 tablet by mouth 3 times daily (with meals) 270 tablet 3    ALPRAZolam (XANAX) 0.5 MG tablet TAKE 1 TABLET BY MOUTH AT BEDTIME FOR ANXIETY 30 tablet 5    calcitRIOL (ROCALTROL) 0.25 MCG capsule Take 1 capsule by mouth daily 30 capsule 5    hydrALAZINE (APRESOLINE) 50 MG tablet Take 1 tablet by mouth 3 times daily 90 tablet 3    linagliptin (TRADJENTA) 5 MG tablet TAKE 1 TABLET BY MOUTH DAILY 90 tablet 3    Multiple Vitamin (MULTIVITAMIN) TABS tablet Take 1 tablet by mouth daily 90 tablet 0    sennosides-docusate sodium (SENOKOT-S) 8.6-50 MG tablet Take 1 tablet by mouth daily Take an additional dose as needed for constipation 30 tablet 0    atorvastatin (LIPITOR) 40 MG tablet Take 1 tablet by mouth nightly 90 tablet 3    BRILINTA 90 MG TABS tablet TAKE 1 TABLET BY MOUTH TWICE DAILY 180 tablet 3    folic acid (FOLVITE) 1 MG tablet Take 1 tablet by mouth daily 90 tablet 3    insulin glargine (LANTUS SOLOSTAR) 100 UNIT/ML injection pen Inject 5 Units into the skin nightly as needed (hyperglycemia-BG >140)         Vitals     BP (!) 158/72 (BP Site: Left Upper Arm, Patient Position: Sitting, BP Cuff

## 2025-03-25 ENCOUNTER — HOSPITAL ENCOUNTER (OUTPATIENT)
Dept: INTERVENTIONAL RADIOLOGY/VASCULAR | Age: 60
Discharge: HOME | End: 2025-03-25
Attending: SURGERY | Admitting: SURGERY
Payer: COMMERCIAL

## 2025-03-25 ENCOUNTER — TELEPHONE (OUTPATIENT)
Dept: CARDIOLOGY CLINIC | Age: 60
End: 2025-03-25

## 2025-03-25 ENCOUNTER — RESULTS FOLLOW-UP (OUTPATIENT)
Dept: FAMILY MEDICINE CLINIC | Age: 60
End: 2025-03-25

## 2025-03-25 VITALS
HEIGHT: 74 IN | BODY MASS INDEX: 36.28 KG/M2 | OXYGEN SATURATION: 99 % | SYSTOLIC BLOOD PRESSURE: 169 MMHG | DIASTOLIC BLOOD PRESSURE: 69 MMHG | WEIGHT: 282.7 LBS | TEMPERATURE: 97.9 F | HEART RATE: 68 BPM | RESPIRATION RATE: 14 BRPM

## 2025-03-25 DIAGNOSIS — N18.6 ESRD (END STAGE RENAL DISEASE) (HCC): Primary | ICD-10-CM

## 2025-03-25 DIAGNOSIS — N17.9 ACUTE KIDNEY INJURY SUPERIMPOSED ON CKD: Primary | ICD-10-CM

## 2025-03-25 DIAGNOSIS — N18.6 ESRD (END STAGE RENAL DISEASE) (HCC): ICD-10-CM

## 2025-03-25 DIAGNOSIS — N18.9 ACUTE KIDNEY INJURY SUPERIMPOSED ON CKD: Primary | ICD-10-CM

## 2025-03-25 LAB
ABO GROUP BLD: NORMAL
ANION GAP SERPL CALC-SCNC: 12 MEQ/L (ref 8–16)
BACTERIA URNS QL MICRO: ABNORMAL /HPF
BILIRUB UR QL STRIP.AUTO: NEGATIVE
BUN SERPL-MCNC: 50 MG/DL (ref 8–23)
CALCIUM SERPL-MCNC: 8.6 MG/DL (ref 8.8–10.2)
CASTS #/AREA URNS LPF: ABNORMAL /LPF
CASTS 2: ABNORMAL /LPF
CHARACTER UR: CLEAR
CHLORIDE SERPL-SCNC: 108 MEQ/L (ref 98–111)
CO2 SERPL-SCNC: 16 MEQ/L (ref 22–29)
COLOR, UA: YELLOW
CREAT SERPL-MCNC: 4.3 MG/DL (ref 0.7–1.2)
CRYSTALS URNS MICRO: ABNORMAL
DEPRECATED RDW RBC AUTO: 53.2 FL (ref 35–45)
EPITHELIAL CELLS, UA: ABNORMAL /HPF
ERYTHROCYTE [DISTWIDTH] IN BLOOD BY AUTOMATED COUNT: 13.8 % (ref 11.5–14.5)
GFR SERPL CREATININE-BSD FRML MDRD: 15 ML/MIN/1.73M2
GLUCOSE SERPL-MCNC: 94 MG/DL (ref 74–109)
GLUCOSE UR QL STRIP.AUTO: NEGATIVE MG/DL
HBV CORE IGG+IGM SERPL QL IA: NONREACTIVE
HCT VFR BLD AUTO: 27.5 % (ref 42–52)
HGB BLD-MCNC: 8.5 GM/DL (ref 14–18)
HGB UR QL STRIP.AUTO: NEGATIVE
IAT IGG-SP REAG SERPL QL: NORMAL
INR PPP: 1.12 (ref 0.85–1.13)
KCT BLD-ACNC: 152 SECONDS (ref 1–150)
KETONES UR QL STRIP.AUTO: NEGATIVE
MCH RBC QN AUTO: 32.6 PG (ref 26–33)
MCHC RBC AUTO-ENTMCNC: 30.9 GM/DL (ref 32.2–35.5)
MCV RBC AUTO: 105.4 FL (ref 80–94)
MISCELLANEOUS 2: ABNORMAL
NITRITE UR QL STRIP: NEGATIVE
PH UR STRIP.AUTO: 5.5 [PH] (ref 5–9)
PLATELET # BLD AUTO: 138 THOU/MM3 (ref 130–400)
PMV BLD AUTO: 10 FL (ref 9.4–12.4)
POTASSIUM SERPL-SCNC: 4.6 MEQ/L (ref 3.5–5.2)
PROT UR STRIP.AUTO-MCNC: 100 MG/DL
RBC # BLD AUTO: 2.61 MILL/MM3 (ref 4.7–6.1)
RBC URINE: ABNORMAL /HPF
RENAL EPI CELLS #/AREA URNS HPF: ABNORMAL /[HPF]
RH BLD: NORMAL
SODIUM SERPL-SCNC: 136 MEQ/L (ref 135–145)
SP GR UR REFRACT.AUTO: 1.01 (ref 1–1.03)
UROBILINOGEN, URINE: 0.2 EU/DL (ref 0–1)
WBC # BLD AUTO: 5.2 THOU/MM3 (ref 4.8–10.8)
WBC #/AREA URNS HPF: ABNORMAL /HPF
WBC #/AREA URNS HPF: NEGATIVE /[HPF]
YEAST LIKE FUNGI URNS QL MICRO: ABNORMAL

## 2025-03-25 PROCEDURE — 36415 COLL VENOUS BLD VENIPUNCTURE: CPT

## 2025-03-25 PROCEDURE — 36558 INSERT TUNNELED CV CATH: CPT

## 2025-03-25 PROCEDURE — 6370000000 HC RX 637 (ALT 250 FOR IP): Performed by: PHYSICIAN ASSISTANT

## 2025-03-25 PROCEDURE — 6370000000 HC RX 637 (ALT 250 FOR IP): Performed by: SURGERY

## 2025-03-25 PROCEDURE — 75716 ARTERY X-RAYS ARMS/LEGS: CPT

## 2025-03-25 PROCEDURE — 6360000004 HC RX CONTRAST MEDICATION: Performed by: SURGERY

## 2025-03-25 PROCEDURE — 80048 BASIC METABOLIC PNL TOTAL CA: CPT

## 2025-03-25 PROCEDURE — 2709999900 IR INTERVENTIONAL RADIOLOGY PROCEDURE REQUEST

## 2025-03-25 PROCEDURE — 2580000003 HC RX 258: Performed by: PHYSICIAN ASSISTANT

## 2025-03-25 PROCEDURE — 86885 COOMBS TEST INDIRECT QUAL: CPT

## 2025-03-25 PROCEDURE — 85027 COMPLETE CBC AUTOMATED: CPT

## 2025-03-25 PROCEDURE — 81001 URINALYSIS AUTO W/SCOPE: CPT

## 2025-03-25 PROCEDURE — 85347 COAGULATION TIME ACTIVATED: CPT

## 2025-03-25 PROCEDURE — 37224 HC FEM POP TERRITORY PLASTY: CPT

## 2025-03-25 PROCEDURE — 77001 FLUOROGUIDE FOR VEIN DEVICE: CPT

## 2025-03-25 PROCEDURE — 76937 US GUIDE VASCULAR ACCESS: CPT

## 2025-03-25 PROCEDURE — 85610 PROTHROMBIN TIME: CPT

## 2025-03-25 PROCEDURE — 6360000002 HC RX W HCPCS: Performed by: SURGERY

## 2025-03-25 PROCEDURE — 7100000011 HC PHASE II RECOVERY - ADDTL 15 MIN: Performed by: SURGERY

## 2025-03-25 PROCEDURE — 7100000010 HC PHASE II RECOVERY - FIRST 15 MIN: Performed by: SURGERY

## 2025-03-25 PROCEDURE — 86901 BLOOD TYPING SEROLOGIC RH(D): CPT

## 2025-03-25 PROCEDURE — 86900 BLOOD TYPING SEROLOGIC ABO: CPT

## 2025-03-25 RX ORDER — BUPIVACAINE HYDROCHLORIDE 2.5 MG/ML
INJECTION, SOLUTION INFILTRATION; PERINEURAL PRN
Status: COMPLETED | OUTPATIENT
Start: 2025-03-25 | End: 2025-03-25

## 2025-03-25 RX ORDER — IOPAMIDOL 612 MG/ML
INJECTION, SOLUTION INTRAVASCULAR PRN
Status: COMPLETED | OUTPATIENT
Start: 2025-03-25 | End: 2025-03-25

## 2025-03-25 RX ORDER — HYDRALAZINE HYDROCHLORIDE 50 MG/1
50 TABLET, FILM COATED ORAL ONCE
Status: COMPLETED | OUTPATIENT
Start: 2025-03-25 | End: 2025-03-25

## 2025-03-25 RX ORDER — MIDAZOLAM HYDROCHLORIDE 1 MG/ML
INJECTION, SOLUTION INTRAMUSCULAR; INTRAVENOUS PRN
Status: COMPLETED | OUTPATIENT
Start: 2025-03-25 | End: 2025-03-25

## 2025-03-25 RX ORDER — PROTAMINE SULFATE 10 MG/ML
INJECTION, SOLUTION INTRAVENOUS PRN
Status: COMPLETED | OUTPATIENT
Start: 2025-03-25 | End: 2025-03-25

## 2025-03-25 RX ORDER — OXYCODONE AND ACETAMINOPHEN 10; 325 MG/1; MG/1
1 TABLET ORAL ONCE
Refills: 0 | Status: COMPLETED | OUTPATIENT
Start: 2025-03-25 | End: 2025-03-25

## 2025-03-25 RX ORDER — SODIUM CHLORIDE 0.9 % (FLUSH) 0.9 %
5-40 SYRINGE (ML) INJECTION EVERY 12 HOURS SCHEDULED
Status: DISCONTINUED | OUTPATIENT
Start: 2025-03-25 | End: 2025-03-25 | Stop reason: HOSPADM

## 2025-03-25 RX ORDER — SODIUM CHLORIDE 0.9 % (FLUSH) 0.9 %
5-40 SYRINGE (ML) INJECTION PRN
Status: DISCONTINUED | OUTPATIENT
Start: 2025-03-25 | End: 2025-03-25 | Stop reason: HOSPADM

## 2025-03-25 RX ORDER — SODIUM CHLORIDE 9 MG/ML
INJECTION, SOLUTION INTRAVENOUS CONTINUOUS
Status: DISCONTINUED | OUTPATIENT
Start: 2025-03-25 | End: 2025-03-25 | Stop reason: HOSPADM

## 2025-03-25 RX ORDER — FENTANYL CITRATE 50 UG/ML
INJECTION, SOLUTION INTRAMUSCULAR; INTRAVENOUS PRN
Status: COMPLETED | OUTPATIENT
Start: 2025-03-25 | End: 2025-03-25

## 2025-03-25 RX ORDER — HEPARIN SODIUM 5000 [USP'U]/ML
INJECTION, SOLUTION INTRAVENOUS; SUBCUTANEOUS PRN
Status: COMPLETED | OUTPATIENT
Start: 2025-03-25 | End: 2025-03-25

## 2025-03-25 RX ORDER — SODIUM CHLORIDE 9 MG/ML
INJECTION, SOLUTION INTRAVENOUS PRN
Status: DISCONTINUED | OUTPATIENT
Start: 2025-03-25 | End: 2025-03-25 | Stop reason: HOSPADM

## 2025-03-25 RX ORDER — WARFARIN SODIUM 5 MG/1
5 TABLET ORAL DAILY
COMMUNITY

## 2025-03-25 RX ADMIN — FENTANYL CITRATE 25 MCG: 50 INJECTION, SOLUTION INTRAMUSCULAR; INTRAVENOUS at 12:19

## 2025-03-25 RX ADMIN — SODIUM CHLORIDE: 0.9 INJECTION, SOLUTION INTRAVENOUS at 08:47

## 2025-03-25 RX ADMIN — FENTANYL CITRATE 25 MCG: 50 INJECTION, SOLUTION INTRAMUSCULAR; INTRAVENOUS at 12:07

## 2025-03-25 RX ADMIN — MIDAZOLAM 1 MG: 1 INJECTION INTRAMUSCULAR; INTRAVENOUS at 11:41

## 2025-03-25 RX ADMIN — MIDAZOLAM 0.5 MG: 1 INJECTION INTRAMUSCULAR; INTRAVENOUS at 11:54

## 2025-03-25 RX ADMIN — MIDAZOLAM 1 MG: 1 INJECTION INTRAMUSCULAR; INTRAVENOUS at 12:19

## 2025-03-25 RX ADMIN — PROTAMINE SULFATE 40 MG: 10 INJECTION, SOLUTION INTRAVENOUS at 12:37

## 2025-03-25 RX ADMIN — FENTANYL CITRATE 25 MCG: 50 INJECTION, SOLUTION INTRAMUSCULAR; INTRAVENOUS at 11:54

## 2025-03-25 RX ADMIN — IOPAMIDOL 12 ML: 612 INJECTION, SOLUTION INTRAVENOUS at 12:33

## 2025-03-25 RX ADMIN — HYDRALAZINE HYDROCHLORIDE 50 MG: 50 TABLET ORAL at 17:58

## 2025-03-25 RX ADMIN — MIDAZOLAM 0.5 MG: 1 INJECTION INTRAMUSCULAR; INTRAVENOUS at 12:07

## 2025-03-25 RX ADMIN — HEPARIN SODIUM 8000 UNITS: 5000 INJECTION, SOLUTION INTRAVENOUS; SUBCUTANEOUS at 12:19

## 2025-03-25 RX ADMIN — FENTANYL CITRATE 50 MCG: 50 INJECTION, SOLUTION INTRAMUSCULAR; INTRAVENOUS at 11:41

## 2025-03-25 RX ADMIN — OXYCODONE AND ACETAMINOPHEN 1 TABLET: 10; 325 TABLET ORAL at 14:46

## 2025-03-25 RX ADMIN — BUPIVACAINE HYDROCHLORIDE 8 ML: 2.5 INJECTION, SOLUTION INFILTRATION; PERINEURAL at 11:49

## 2025-03-25 ASSESSMENT — PAIN DESCRIPTION - ORIENTATION
ORIENTATION: LEFT
ORIENTATION: LEFT

## 2025-03-25 ASSESSMENT — PAIN DESCRIPTION - PAIN TYPE: TYPE: CHRONIC PAIN

## 2025-03-25 ASSESSMENT — PAIN DESCRIPTION - DESCRIPTORS
DESCRIPTORS: ACHING
DESCRIPTORS: BURNING;SHARP

## 2025-03-25 ASSESSMENT — PAIN DESCRIPTION - ONSET: ONSET: ON-GOING

## 2025-03-25 ASSESSMENT — PAIN DESCRIPTION - FREQUENCY: FREQUENCY: CONTINUOUS

## 2025-03-25 ASSESSMENT — PAIN DESCRIPTION - LOCATION
LOCATION: LEG
LOCATION: BACK

## 2025-03-25 ASSESSMENT — PAIN SCALES - GENERAL: PAINLEVEL_OUTOF10: 8

## 2025-03-25 NOTE — PROGRESS NOTES
TRANSFER - OUT REPORT:    Verbal report given to Faith RN(name) on Garrett Duncan being transferred to (unit) for routine progression of patient care       Report consisted of patient's Situation, Background, Assessment and   Recommendations(SBAR).     Information from the following report(s) Nurse Handoff Report, Surgery Report, and MAR was reviewed with the receiving nurse.    Opportunity for questions and clarification was provided.      Patient transported with:   Monitor

## 2025-03-25 NOTE — DISCHARGE INSTRUCTIONS
DIALYSIS CATHETER DISCHARGE CARE  [facility] will call in 1-2 days with chair time  Dr Kathleen will see you there  Diet:  As before    Care of catheter:  Keep dressing clean and dry  Ice to catheter site for next 24 hours (20 minutes every hour)  Limit activity to shoulder (no pushing, pulling, or lifting) for next 3 days  No shower or tub baths until dressing is changed by dialysis nurses    Return to nearest Emergency Room if any of the following:  Symptoms of bleeding, drainage, swelling  Increase in pain  Elevated temperature over 101 degrees    Keep scheduled appointment in dialysis.  Sutures to be removed in 10 days by dialysis nurses.  If you have any problems, call your doctor or return to nearest Emergency Room.        HOLD METFORMIN OR GLUCOPHAGE  For 48 hours post procedure. May resume metformin (glucophage) on***.  Monitor blood sugars and call family doctor if elevated, adhere to strict diabetic diet while off metformin.         For Groin approach:    Remove  dressing in 24 hours, gently clean site with soap and water, pat dry, and apply bandaid daily for 5 days.  Keep site clean and dry.  Do not apply any creams, lotions, or powders to puncture site.  Do not submerse site in water (no tub baths, swimming, or whirlpool) for 5 days.  Take it easy for 3 days.  No driving for 3 days.  Avoid heavy  lifting, pushing, pulling or straining.  Monitor site for bleeding, drainage, or swelling.  Monitor for signs of infection which include:  redness, drainage, soreness, or temp greater than 101 F.  Notify doctor of any abnormal findings as listed.  If bleeding occurs, hold firm pressure at site and call 911.      Home Care  Bathe and shower as usual. But keep the wound dry and covered with a bandage for the first 24 hours.    After the first 24 hours, remove the bandage before you shower. Wash the area with soap and water daily. Replace it with a new bandage after cleaning.    Do not soak in a pool or tub and do

## 2025-03-25 NOTE — H&P
HISTORY AND PHYSICAL             Date: 3/25/2025        Patient Name: Garrett Duncan     YOB: 1965      Age:  59 y.o.    Chief Complaint   No chief complaint on file.     ”Ulceration left posterior calf”    History Obtained From   patient    History of Present Illness   The patient is a 59-year-old male who has likely SFA occlusion and an ulceration on the posterior aspect of the left calf.  This has been there for quite some time.  Unfortunately we had to cancel his case previously as I did not have time during that schedule day.  I was involved with emergency cases.  He is here today for arteriography of the left lower extremity and possible intervention.  I was called by the nephrologist who is taking care of Mr. Duncan who asked me to place a tunneled catheter.  The patient is on Brilinta and I will discuss this with the patient as to whether he wants the tunneled catheter now or after Brilinta has been held.  I am happy to do it now with the knowledge that he may have significant bruising and/or hematoma of the chest and neck.    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    CAD (coronary artery disease) 03/25/2014    s/p CABG in may 2014    Chronic kidney disease     Diabetes mellitus ([facility])     Drug abuse ([facility])     hx of cacaine abuse, stopped abusing drugs in 2012    GERD (gastroesophageal reflux disease)     History of implantable cardiac defibrillator (ICD)     Hypertension     Intradural extramedullary spinal tumor     Long term (current) use of anticoagulants 08/29/2019    [employer] dual icd      Neuromuscular disorder ([facility])     Paroxysmal atrial fibrillation ([facility]) 07/22/2014    Prolonged emergence from general anesthesia     from CABG surgery    Severe obesity (BMI 35.0-39.9) with comorbidity 05/22/2023    V-tach ([facility])     s/p

## 2025-03-25 NOTE — OP NOTE
Operative Note      Patient: Garrett Duncan  YOB: 1965  MRN: [id number]    Date of Procedure: 3/25/2025    Preop diagnosis:  End-stage renal disease in need of dialysis access.  Left lower extremity atherosclerotic disease with calf ulceration    Post-Op Diagnosis: Same       Procedure:  1.  Placement of right IJ tunneled catheter.  2.  Right common femoral arterial access under ultrasound guidance.  3.  Placement of catheter into right common femoral artery with right common femoral profundofemoral proximal SFA arteriography.  4.  Placement of catheter into mid SFA with SFA and popliteal arteriography.  5.  Placement of catheter into left distal popliteal artery with popliteal and tibioperoneal arteriography.  6.  Balloon angioplasty left mid and distal SFA with a 5 x 150 Admiral Impact drug-coated balloon.    Surgeon(s):  Garrett Vallecillo MD    Assistant:   * No surgical staff found *    Anesthesia: 1 of Versed and 50 of fentanyl    Estimated Blood Loss (mL): Minimal    Complications: None    Specimens:   * No specimens in log *    Implants:  * No implants in log *      Drains: * No LDAs found *    Findings:  Infection Present At Time Of Surgery (PATOS) (choose all levels that have infection present):  No infection present  Other Findings: The right IJ was in acceptable conduit for dialysis access catheter.  The left SFA had a near occlusion at its mid to distal aspect which was treated with angioplasty using a drug-coated balloon.  This produced a good arteriographic result.  He has an adequate anterior tibial and posterior tibial artery as runoff.  His peroneal is diminutive but patent.    Detailed Description of Procedure:   The patient was brought to the operating room and placed in a supine position with his arms tucked at his sides.  He was identified as Garrett Duncan for a tunneled dialysis access catheter as requested by nephrology this morning and left lower extremity arteriography and

## 2025-03-25 NOTE — FLOWSHEET NOTE
1600 assumed care of patient from Elicia Cline RN.   1630 Notified [facility] coumadin [facility] regarding instructions for patient .  Hannah from coumadin clinic faxed over instructions and added to AVS.   1745 patient up to chair, tolerated activity well.  1800 Ambulated in [place]onez, tolerated activity well.   1845 Patient goals/plan/treatment preferences discussed by this RN.  Discharge planning provided by this RN.  Patient goals/plan/treatment preferences reviewed with patient/family.  Patient/family verbalize understanding of discharge plan and are in agreement with goal/plan/treatment preferences.  Understanding was demonstrated using the teach back method.  AVS provided by this RN at time of discharge, which includes all necessary medical information pertaining to the patients current course of illness, treatment, post-discharge goals of care, and treatment preferences.

## 2025-03-25 NOTE — PROGRESS NOTES
0903 Patient received in IR for arteriogram procedure with family taken to main radiology.  0903 Dr. Vallecillo in a surgery case at this time, will send patient back to  until physician is ready.  0908 Updated Faith RIVER.  0918 Patient taken to  via bed/transport.  1100 Dr. Vallecillo assessing patient in .  1124 Patient received in IR for arteriogram and tunneled dialysis catheter placement procedure with family taken to main radiology waiting room.  1128 This procedure has been fully reviewed with the patient and written informed consent has been obtained.  1135 Patient prepped for procedure right IJ, right groin.  1147 Procedure started with Dr. Vallecillo.  1150 Access to right jugular vein with use of sonosite.  1200 Line sutured in place.  1201 Gauze and opsite covering site. No bleeding noted.  1208 Access to right femoral artery with use of sonosite.   1223 Angioplasty of left SFA with IN.PACT 5 x 150 balloon.   1230 Procedure completed; patient tolerated well.   1245 Dr. Vallecillo spoke to patient's family.  1250 protamine injection complete.  1300 ACT drawn.  1304 ACT result: 152  1313 Sheath removed from right femoral site, manual pressure held  1333 Manual pressure released  1336 Bacitracin oint, gauze, and op site to Right femoral site; area soft to touch with no bleeding noted. Fem stop applied.  1338 Patient on bed; comfort ensured. Right femoral dressing remains dry and intact with area soft.   1338 Patient taken to 2E via bed with family at bedside. Right femoral dressing remains dry and intact with area soft. Pt alert and oriented x3; follows commands. Skin pink, warm, and dry. Respirations easy, regular, and nonlabored.

## 2025-03-25 NOTE — PROGRESS NOTES
0749 Patient admitted to 2E03  Ambulatory for angiogram left extremity.  Patient NPO since 0200, states had coffee at 0200. Patient accompanied by daughter and son.  Vital signs obtained.   Assessment and data collection intiated.   Oriented to room.  Policies and procedures for 2E explained.   All questions answered with no further questions at this time.   Fall prevention and safety precautions discussed with patient.     States last time he was here he was here all day than cancelled for emergencies.     0750 Care plan reviewed with patient .  Patient   verbalize understanding of the plan of care and contribute to goal setting.   Patient states he has a sore on his left calf, bandaid intact, states he has been going to [facility]  for this. States Pain in left calf a 6, states back pain a 8, states he took percocet this am which helps both pain, states best leg pain gets in 3-4.     States he has been having green diarrhea for last couple weeks, states he brought a stool specimen in yesterday.   States he is starting dialysis next week.     0900   Attempted to reach Dr Vallecillo thru radiology, states to call him directly.   Patient left for procedure per bed.   0902 Dr Vallecillo in OR to call me back.   0906 Rosa from IR called and states  procedure is delayed, patient to return to floor.   0920 Returned to floor.     1118  To radiology per bed, stable condition.     1138 Dr Vallecillo called Instructed him on last coumadin , lovenox and brilinta dose, also informed of h and h and , bun, creat, green diarrhea of last couple weeks and adenovirus in stool speciman that was brought in yesterday.     1345 Care taken over from radiology, rt femerol site with femstop on at 47 mmhg. groin stable .Patient has a tunneled catheter to rt chest, scant amount of red drainage present.  Patient reinstructed on bedrest, instructed to keep legs straight, not to cross legs, not to lift head off of pillow, not to laugh hard, if coughs to guard

## 2025-03-25 NOTE — TELEPHONE ENCOUNTER
PAOLO Jefferson from [facility] office calling.  Pt saw Dr Kathleen in office yesterday, needs dialysis catheter placed sooner rather than later. Dr Kathleen is aware of pt's peripheral procedure today with Dr Vallecillo. Pt has held Coumadin x 5 days for peripheral case today, he has stayed on the Brilinta.  Dr Kathleen would need patient to hold Brilinta too. Dr Kathleen is thinking patient may just end up being admitted to get all of this addressed, but he wanted to make Dr Dempsey aware. Advised Li that if patient gets a stent today with Dr Vallecillo that he may not be able to come off antiplatelets. She has not spoken to Dr Vallceillo's office. Advised she call 2E and speak with patient's nurse and make aware of need for dialysis catheter so this can be discussed with Dr Vallecillo.     Spoke to Li again, Dr Dempsey hasn't seen this patient in over a year. He isn't going to have much to add without seeing the patient. Li states Dr Kathleen is trying to call Dr Vallecillo right now so they can sort this out and hopefully have dialysis catheter inserted while patient is in hospital.

## 2025-03-25 NOTE — PROCEDURES
PROCEDURE NOTE  Date: 3/25/2025   Name: Garrett Duncan  YOB: 1965    Procedures  Bladder scan completed at 1510 and results told to Faith RIVER. Scan showed 560 mL in the patients bladder.

## 2025-03-26 ENCOUNTER — TELEPHONE (OUTPATIENT)
Dept: UROLOGY | Age: 60
End: 2025-03-26

## 2025-03-26 NOTE — TELEPHONE ENCOUNTER
Consult for urinary retention post op  Instructed to insert pringle and see in office 1-2 wks  Needs OV with Reji for possible VT and sx check 1-2 wks

## 2025-03-27 ENCOUNTER — TELEPHONE (OUTPATIENT)
Dept: NEPHROLOGY | Age: 60
End: 2025-03-27

## 2025-03-27 DIAGNOSIS — R33.9 URINARY RETENTION: Primary | ICD-10-CM

## 2025-03-27 NOTE — PROCEDURES
[facility] ()  [address] [address]  Dept: [phone]  CARDIAC ELECTROPHYSIOLOGY: PROCEDURE NOTE  Patients Demographics:  Date:   11/21/2022  Patient name:              Nohemy Matute  YOB: 1965  Sex:    male   MRN:   [id number]    Cardiac Electrophysiologist:   Charlynne Severin, MD, [facility], [place], [place]    Primary Care Provider:     ISABELLA Rahman - CNP     Cardiologist:  Nadeen Yan MD    Procedure Planned:  Atrial fibrillation catheter ablation. Indication/s for the Procedure:  Drug refractory symptomatic long standing persistent atrial fibrillation. Brief Clinical Summary:  57/M with symptomatic long standing persistent AF on amiodarone electively presents for AF ablation. Uninterrupted anticoagulation with warfarin (INR 2.2). Medical Hx: PeAF dx 2014=> recurrence sp DCCV 4/2022, CAD/CABG x3 (5/2014) and PCI-LAD (10/2020), ICM (LVEF 25-30%), VT ablation (2014) on amiodarone and mexilitine, TDI Bassline secondary prevention DC-AICD by Dr. Ursula Meléndez (10/2014),  anomalous origin of RCA, HTN, HPL and obesity,         Procedure/s Performed:  Left and right atrial pacing and recording. 3D electro anatomical mapping (CARTO). Intracardiac echocardiography (ICE). Right and left cardiac catheterization with ICE guided transseptal puncture. Left and right pulmonary vein isolation in pairs. Cavo-tricuspid isthmus ablation. Left atrial posterior wall isolation. Electric cardioversion for atrial fibrillation. Description of the Procedure: The patient was brought to electrophysiology laboratory in a fasting and non-sedated state. General anesthesia was administered by anesthesia Service. He was prepped and draped in the usual sterile fashion. Lidocaine, 2% was injected into femoral regions and right side of the neck for local anesthesia.  Vascular access was obtained under ultrasound guidance and hemostatic short sheaths placed over the guidewires (9-French and 7-French in the right femoral vein, 11-French in left femoral vein, and 8-French in the right internal jugular vein). A 3D geometry of right atrium and coronary sinus was created using 3.5 mm irrigated tip contact-sense catheter (D/F STSF). A dual site 7-French catheter (Medical TZ) was inserted through the right internal jugular vein and positioned in the coronary sinus with proximal electrodes resting along high right atrium. A 10 French phased array ultrasound catheter was advanced into right atrium via left femoral vein for intracardiac echocardiography. Initial scanning with ICE showed no pericardial effusion or left atrial clots. A single transseptal puncture was performed under ICE guidance, using 8.5-French steerable sheath ([employer])  and 98-cm Brockenbrough needle. A 5,000  units of heparin bolus was administered before and a 10,000 units soon after transseptal puncture. Subsequently, ACT was monitored every 20 minutes. Heparin boluses were given as needed to maintain an ACT between 300-350 seconds throughout the left heart catheterization. The 6-French multipolar irrigated mapping catheter ([employer]) was advanced into left atrium through the Visigo sheath. At baseline the patient was in atrial fibrillation. Successful electrocardioversion was performed by a 360 J synchronized electric shock. Left atrial 3D geometry and voltage map was created during coronary sinus pacing (Scattered low voltage throughout LA). During the process, the patient's rhythm changed to atrial flutter with a flutter cycle length of 280 to 290 ms, proximal to distal activation on coronary sinus catheter and 2:1 1 AV conduction. Entrainment and and righ atrial activation mapping was suggestive of typical counterclockwise flutter. We decided to proceed with cavotricuspid isthmus (CTI) ablation. The sheath and the catheters were withdrawn into the right atrium.   The ablation catheter was towards tricuspid annulus and a linear ablation line was delivered across the CTI in a point by point fashion under ICE and electroanatomic map guidance. During first pass, the flutter terminated with a CTI block. The ablation catheter and the sheath were advanced into the left atrium. Ablation index guided wide antral circumferential radiofrequency ablation was performed around the pulmonary veins in pairs during coronary sinus pacing. After the completion of the circumferential lesion sets all the 4 veins remained electrically connected with left atrium. Left atrial voltage map and pulmonary vein mapping was performed. Radiofrequency energy was delivered at earliest breakthrough sites till electric isolation of veins was achieved. This included ablation along the osbaldo on both sides. The circumferential lesion sets on the 2 sides were connected posteriorly by a superior and inferior linear ablation lines to create box isolation of the left atrial posterior wall. Additional focal ablation was performed within the box to achieve complete isolation of the LA posterior wall. The entrance and exit blocks were confirmed in all veins and posterior wall by pacing maneuvers. The esophageal temperature was monitored throughout the procedure. The ablation was stopped with 0.5 °C rise in esophageal temperature. The maximum recorded esophageal temperature during ablation was 37.4 °C.  Adenosine was administered intravenously and we did not find acute electrical reconnection of pulmonary veins. The catheter and the sheath were withdrawn into the right atrium. Bidirectional block across the cavotricuspid isthmus was confirmed by differential pacing. Post ablation CTI conduction time was 200 ms. Programmed stimulation was repeated and no arrhyhtmia induced. Post ablation, sinus cycle length was 844 ms,  ms, QRSd 100 ms, QT interval 484 ms, AH 70 ms, HV 42 ms, AV Wenckebach 430 ms and AV node /6 100 ms. Corrected sinus node recovery time was 375 ms. Probable retrograde conduction during ventricular pacing was concentric and decremental.      At the end of the procedure imaging with ICE showed no left atrial clots or pericardial effusion. The catheters and sheaths were removed and hemostasis achieved by figure of 8 stitch (groins) and manual compression. The access sites were covered by light dressing. Ablation Summary:  Total number of RF lesions 215 and total RF time 35.20 minutes. Maximum energy used 40 fan along the posterior wall and the roof (Ablation Index 300-350) and CTI and 50 fan along the anterior wall (Ablation Index 400-450). The mean catheter tip temperature during the RF application was 44°R..      Medications:   General anesthesia  Heparin 17,000 units IV. Protamine 40 mg IV. Lasix 20 mg IV. Lidocaine 10 cc SC. Adenosine 72 mg IV. Amiodarone 150 mg IV. Fluoroscopic Time:  Zero. Blood Loss (estimated):  Less than 50 cc. Fluid Balance (IN and OUT):  2000 cc / 0 cc. Complications:  None. Summary:  Successful electric isolation of pulmonary veins using radiofrequency catheter ablation. Successful cavo-tricuspid isthmus ablation with bidirectional block. Successful electrical isolation of left atrial posterior wall (box lesion). Normal sinus node, AV node and His Purkinje functions. Synchronized electric cardioversion for atrial fibrillation. Recommendations:   Observation for 2 hours. Resume antiarrhythmic drugs, metoprolol and anticoagulation. Proton pump inhibitors for a month. Post operative care per protocol.        Electronically signed by Jones Driver, MD, [facility] on 11/21/2022 at 12:25 PM 1% lidocaine

## 2025-03-27 NOTE — TELEPHONE ENCOUNTER
Okay to schedule him to remove the catheter and maybe check Postvoid residual 2 days after the catheter is removed

## 2025-03-27 NOTE — TELEPHONE ENCOUNTER
Patient will stop in clinic today (before 12:30 pm) and Li will remove catheter and give the order for the postvoid residual.

## 2025-03-27 NOTE — TELEPHONE ENCOUNTER
Garrett starts Dialysis on Monday. He had his  dialysis catheter placed and a stent in his leg as well. He was not able to urinate before leaving. Hospital ordered catheter placement. Garrett states he \"needed privacy to go and there were too many people around.\" Also stated  \"never had issues urinating prior to this procedure.\" He is requesting an order to have it removed ASAP. He does not follow urology. He is also asking if he should resume Bumex QD. Please advise.

## 2025-03-27 NOTE — TELEPHONE ENCOUNTER
Talked with urology and patient has appt with them on 3-31-25 at 9\"15 and they recommend leaving cath in place until then. Patient informed and will go to appt on monday

## 2025-03-28 RX ORDER — BUMETANIDE 2 MG/1
2 TABLET ORAL DAILY
COMMUNITY
Start: 2025-03-11

## 2025-03-29 ENCOUNTER — HOSPITAL ENCOUNTER (EMERGENCY)
Age: 60
Discharge: HOME | End: 2025-03-29
Attending: EMERGENCY MEDICINE
Payer: COMMERCIAL

## 2025-03-29 VITALS
RESPIRATION RATE: 18 BRPM | OXYGEN SATURATION: 99 % | HEART RATE: 77 BPM | SYSTOLIC BLOOD PRESSURE: 154 MMHG | TEMPERATURE: 98 F | DIASTOLIC BLOOD PRESSURE: 83 MMHG

## 2025-03-29 DIAGNOSIS — T82.838A BLEEDING DUE TO DIALYSIS CATHETER PLACEMENT, INITIAL ENCOUNTER: Primary | ICD-10-CM

## 2025-03-29 LAB
ALBUMIN SERPL BCG-MCNC: 3.6 G/DL (ref 3.4–4.9)
ALP SERPL-CCNC: 65 U/L (ref 40–129)
ALT SERPL W/O P-5'-P-CCNC: 7 U/L (ref 10–50)
ANION GAP SERPL CALC-SCNC: 11 MEQ/L (ref 8–16)
AST SERPL-CCNC: 15 U/L (ref 10–50)
BASOPHILS ABSOLUTE: 0 THOU/MM3 (ref 0–0.1)
BASOPHILS NFR BLD AUTO: 0.6 %
BILIRUB SERPL-MCNC: 0.3 MG/DL (ref 0.3–1.2)
BUN SERPL-MCNC: 49 MG/DL (ref 8–23)
CALCIUM SERPL-MCNC: 9.2 MG/DL (ref 8.8–10.2)
CHLORIDE SERPL-SCNC: 110 MEQ/L (ref 98–111)
CO2 SERPL-SCNC: 17 MEQ/L (ref 22–29)
CREAT SERPL-MCNC: 4.2 MG/DL (ref 0.7–1.2)
DEPRECATED RDW RBC AUTO: 52.8 FL (ref 35–45)
EOSINOPHIL NFR BLD AUTO: 6.7 %
EOSINOPHILS ABSOLUTE: 0.5 THOU/MM3 (ref 0–0.4)
ERYTHROCYTE [DISTWIDTH] IN BLOOD BY AUTOMATED COUNT: 13.5 % (ref 11.5–14.5)
GFR SERPL CREATININE-BSD FRML MDRD: 15 ML/MIN/1.73M2
GLUCOSE SERPL-MCNC: 108 MG/DL (ref 74–109)
HCT VFR BLD AUTO: 26.4 % (ref 42–52)
HGB BLD-MCNC: 8.3 GM/DL (ref 14–18)
IMM GRANULOCYTES # BLD AUTO: 0.01 THOU/MM3 (ref 0–0.07)
IMM GRANULOCYTES NFR BLD AUTO: 0.1 %
INR PPP: 1.48 (ref 0.85–1.13)
LYMPHOCYTES ABSOLUTE: 0.4 THOU/MM3 (ref 1–4.8)
LYMPHOCYTES NFR BLD AUTO: 6.2 %
MCH RBC QN AUTO: 33.3 PG (ref 26–33)
MCHC RBC AUTO-ENTMCNC: 31.4 GM/DL (ref 32.2–35.5)
MCV RBC AUTO: 106 FL (ref 80–94)
MONOCYTES ABSOLUTE: 0.6 THOU/MM3 (ref 0.4–1.3)
MONOCYTES NFR BLD AUTO: 8 %
NEUTROPHILS ABSOLUTE: 5.6 THOU/MM3 (ref 1.8–7.7)
NEUTROPHILS NFR BLD AUTO: 78.4 %
NRBC BLD AUTO-RTO: 0 /100 WBC
OSMOLALITY SERPL CALC.SUM OF ELEC: 289.2 MOSMOL/KG (ref 275–300)
PLATELET # BLD AUTO: 134 THOU/MM3 (ref 130–400)
PMV BLD AUTO: 10.3 FL (ref 9.4–12.4)
POTASSIUM SERPL-SCNC: 5.1 MEQ/L (ref 3.5–5.2)
PROT SERPL-MCNC: 6.3 G/DL (ref 6.4–8.3)
RBC # BLD AUTO: 2.49 MILL/MM3 (ref 4.7–6.1)
SODIUM SERPL-SCNC: 138 MEQ/L (ref 135–145)
WBC # BLD AUTO: 7.1 THOU/MM3 (ref 4.8–10.8)

## 2025-03-29 PROCEDURE — 85025 COMPLETE CBC W/AUTO DIFF WBC: CPT

## 2025-03-29 PROCEDURE — 99283 EMERGENCY DEPT VISIT LOW MDM: CPT

## 2025-03-29 PROCEDURE — 2500000003 HC RX 250 WO HCPCS

## 2025-03-29 PROCEDURE — 36415 COLL VENOUS BLD VENIPUNCTURE: CPT

## 2025-03-29 PROCEDURE — 6360000002 HC RX W HCPCS

## 2025-03-29 PROCEDURE — 80053 COMPREHEN METABOLIC PANEL: CPT

## 2025-03-29 PROCEDURE — 85610 PROTHROMBIN TIME: CPT

## 2025-03-29 RX ORDER — LIDOCAINE HYDROCHLORIDE AND EPINEPHRINE 10; 10 MG/ML; UG/ML
20 INJECTION, SOLUTION INFILTRATION; PERINEURAL ONCE
Status: COMPLETED | OUTPATIENT
Start: 2025-03-29 | End: 2025-03-29

## 2025-03-29 RX ORDER — TRANEXAMIC ACID 100 MG/ML
1000 INJECTION, SOLUTION INTRAVENOUS ONCE
Status: COMPLETED | OUTPATIENT
Start: 2025-03-29 | End: 2025-03-29

## 2025-03-29 RX ADMIN — LIDOCAINE HYDROCHLORIDE AND EPINEPHRINE 20 ML: 10; 10 INJECTION, SOLUTION INFILTRATION; PERINEURAL at 19:23

## 2025-03-29 RX ADMIN — TRANEXAMIC ACID 1000 MG: 100 INJECTION, SOLUTION INTRAVENOUS at 19:22

## 2025-03-29 ASSESSMENT — PAIN - FUNCTIONAL ASSESSMENT: PAIN_FUNCTIONAL_ASSESSMENT: NONE - DENIES PAIN

## 2025-03-29 NOTE — ED NOTES
Pt had a dialysis catheter placed in the right chest on Tuesday. States he just restarted his coumadin yesterday. Today approximately 1 hour ago he noticed his catheter began to bleed. Bandage saturated of blood at this time. Pt has no pain and denies any injury to site. Denies any dizziness, sob or chest pain.  
Pt sitting up on the edge of the bed. No bleeding noted through dressing at this time.   
7

## 2025-03-29 NOTE — ED PROVIDER NOTES
this patient ED visit)      Radiologic studies results available at the moment of this note (None if blank):  No orders to display     See ED course below for my interpretation if applicable.  All radiology images independently reviewed by me in the clinical context of this patient, in addition to interpretation provided by the radiologist.      EKG interpretation (none if blank):  Not applicable  All EKG results are individually reviewed and interpreted by me in the clinical context of this patient.  All EKGs are also interpreted by our Cardiology department, final interpretation may not be available as of the writing of this note.      PREVIOUS RECORDS  AND EXTERNAL INFORMATION REVIEWED   History obtained from: chart review and the patient.    Pertinent previous and/or external records reviewed:  Past medical history, previous hospital visit .    Case discussed with specialties other than Emergency Medicine: Not Applicable      MEDICAL DECISION MAKING   Initial Assessment Summary:   59-year-old male past medical history diabetes, hypertension, CAD, CHF, A-fib, PAD on Brilinta and Coumadin presents the emergency department for bleeding around his dialysis catheter.  Please see ED course and MDM sections below for continuation and resolution of this initial assessment if applicable.    Initial plan:   CBC, CMP, INR       Comorbid conditions pertinent to this ED encounter:  Not applicable      Differential Diagnosis includes but is not limited to:  Coagulopathy, supratherapeutic INR, bleeding from surgical site,       Decision Rules/Clinical Scores utilized:  Not Applicable       Code Status:  Not addressed during this ED visit    Social determinants of health impacting treatment or disposition:  Considered and not applicable     MIPS documentation:  N/A    Medical Decision Making    59-year-old male past medical history diabetes, hypertension, CAD, CHF, A-fib, PAD on Brilinta and Coumadin presents the emergency  may contain errors not detected in proofreading.    Electronically signed by Kimani Spain MD on 3/29/25 at 3:28 PM EDT

## 2025-03-31 ENCOUNTER — TELEPHONE (OUTPATIENT)
Dept: FAMILY MEDICINE CLINIC | Age: 60
End: 2025-03-31

## 2025-03-31 ENCOUNTER — OFFICE VISIT (OUTPATIENT)
Dept: UROLOGY | Age: 60
End: 2025-03-31
Payer: COMMERCIAL

## 2025-03-31 ENCOUNTER — TELEPHONE (OUTPATIENT)
Dept: WOUND CARE | Age: 60
End: 2025-03-31

## 2025-03-31 VITALS — BODY MASS INDEX: 35.94 KG/M2 | HEIGHT: 74 IN | RESPIRATION RATE: 20 BRPM | WEIGHT: 280 LBS

## 2025-03-31 DIAGNOSIS — N40.1 BPH WITH OBSTRUCTION/LOWER URINARY TRACT SYMPTOMS: ICD-10-CM

## 2025-03-31 DIAGNOSIS — N13.8 BPH WITH OBSTRUCTION/LOWER URINARY TRACT SYMPTOMS: ICD-10-CM

## 2025-03-31 DIAGNOSIS — R33.8 ACUTE URINARY RETENTION: Primary | ICD-10-CM

## 2025-03-31 PROCEDURE — 99213 OFFICE O/P EST LOW 20 MIN: CPT

## 2025-03-31 NOTE — PATIENT INSTRUCTIONS
If unable to urinate within the next 5-6 hours, present back to the office for pringle catheter placement.

## 2025-03-31 NOTE — TELEPHONE ENCOUNTER
Please call patient for his ED follow up call after 3:00pm due to his work schedule.    MyChart msg along with a scheduling ticket sent to the patient.

## 2025-03-31 NOTE — TELEPHONE ENCOUNTER
Care Transitions Initial Follow Up Call    Outreach made within 2 business days of discharge: Yes    Patient: Garrett Duncan Patient : 1965   MRN: [id number]  Reason for Admission: Bleeding due to dialysis catheter placement, initial encounter   Discharge Date: 3/29/25       Spoke with: N/A    Discharge department/facility: [facility]      No needs identified  Attempted to contact patient. Received voicemail. Left VM message.            Scheduled appointment with PCP within 7-14 days    Follow Up  Future Appointments   Date Time Provider Department Center   2025  3:15 PM Garrett Vallecillo MD [facility] [place]   2025  2:45 PM Calin Malone PA-C [place] [facility] [place]   2025  2:30 PM Jen Nielsen APRN - CNP N [facility] CHF [facility] - [place]   2025 10:15 AM Fany Guzmán MD N [facility] [place]       Matilde Gutierrez LPN

## 2025-03-31 NOTE — PROGRESS NOTES
[facility] [place] [facility]  [facility]  [address]  [address]  [facility] [address]  Dept: [phone]  Loc: [phone]  Visit Date: 3/31/2025      HPI  Garrett Duncan is a 59 y.o. male that presents to the urology clinic for urinary retention follow-up.    BPH & Urinary Retention  Post-op urinary retention on 03/29/25 following placement of tunneled dialysis catheter and left lower extremity arteriography by Dr. Vallecillo. End-stage renal disease.    560 mL noted on bladder scan while IP. Pringle placed without difficulty.     This is the patient's second time requiring Urology 2/2 to pringle catheterization, first in January of 2024. Patient with history of alcohol abuse and non-compliance with medication.    Bilateral Renal Cysts  Bosniak 1. No indication for routine surveillance per Dr. Chavarria (see OV 2022).      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     Intradural extramedullary spinal tumor     Long term (current) use of anticoagulants 08/29/2019    Medtronic dual icd      Neuromuscular disorder (HCC)     Paroxysmal atrial fibrillation (HCC) 07/22/2014    Prolonged emergence from general anesthesia     from CABG surgery    Severe obesity (BMI 35.0-39.9) with comorbidity 05/22/2023

## 2025-04-01 NOTE — TELEPHONE ENCOUNTER
Care Transitions Initial Follow Up Call    Outreach made within 2 business days of discharge: Yes    Patient: Garrett Duncan Patient : 1965   MRN: [id number]  Reason for Admission: Bleeding due to dialysis catheter placement, initial encounter     Discharge Date: 3/29/25       Spoke with: Attempted to speak with patient, left voice mail message    Discharge department/facility: [facility]    Scheduled appointment with PCP within 7-14 days    Follow Up  Future Appointments   Date Time Provider Department Center   2025  3:15 PM Garrett Vallecillo MD [facility] VASC [facility] [place]   2025  2:45 PM Calin Malone PA-C [facility] [place] [facility] [place]   2025  2:30 PM Jen Nielsen APRN - CNP N [facility] CHF [facility] [place]   2025 10:15 AM Fany Guzmán MD N [facility] Heart [facility] [place]       Camila Yao LPN

## 2025-04-08 ENCOUNTER — OFFICE VISIT (OUTPATIENT)
Age: 60
End: 2025-04-08
Payer: COMMERCIAL

## 2025-04-08 VITALS
SYSTOLIC BLOOD PRESSURE: 139 MMHG | HEART RATE: 66 BPM | BODY MASS INDEX: 35.39 KG/M2 | WEIGHT: 275.8 LBS | DIASTOLIC BLOOD PRESSURE: 65 MMHG | HEIGHT: 74 IN

## 2025-04-08 DIAGNOSIS — I89.0 LYMPHEDEMA: Primary | ICD-10-CM

## 2025-04-08 DIAGNOSIS — I70.245 ATHEROSCLEROSIS OF NATIVE ARTERIES OF LEFT LEG WITH ULCERATION OF OTHER PART OF FOOT (HCC): ICD-10-CM

## 2025-04-08 PROCEDURE — 99213 OFFICE O/P EST LOW 20 MIN: CPT | Performed by: SURGERY

## 2025-04-08 PROCEDURE — 3075F SYST BP GE 130 - 139MM HG: CPT | Performed by: SURGERY

## 2025-04-08 PROCEDURE — 3078F DIAST BP <80 MM HG: CPT | Performed by: SURGERY

## 2025-04-08 NOTE — PROGRESS NOTES
today to see if we can get some edema out of the left lower extremity to improve his ulcer healing on the calf.    Electronically signed by:  Garrett Vallecillo MD

## 2025-04-10 RX ORDER — FOLIC ACID 1 MG/1
1 TABLET ORAL DAILY
Qty: 90 TABLET | Refills: 3 | Status: SHIPPED | OUTPATIENT
Start: 2025-04-10 | End: 2026-04-05

## 2025-04-14 ENCOUNTER — TELEPHONE (OUTPATIENT)
Dept: CARDIOLOGY CLINIC | Age: 60
End: 2025-04-14

## 2025-04-28 ENCOUNTER — TELEPHONE (OUTPATIENT)
Dept: FAMILY MEDICINE CLINIC | Age: 60
End: 2025-04-28

## 2025-04-28 NOTE — TELEPHONE ENCOUNTER
Patient was seen at [facility] today with HUGH Pearce and they wanted patient scheduled for an appointment.   She stated that Matilde and patient was talking and he mentioned depression, very emotional about his health and suicidal thoughts.   Asked if patient was stable or do they think patient needs to be evaluated at the ER. She stated that he is stable to was just brought up in conversation that he was struggling with his health not being well.     Patient is scheduled for Wednesday. Informed that we will discuss with Leonel Adorno CNP and call patient if there is something sooner.   She verbalized understanding.

## 2025-04-28 NOTE — TELEPHONE ENCOUNTER
Spoke with  patient regarding above.  Denies need for ED at this time.  Reviewed appt with patient and need to go to ED if needed for depression/suicidal thoughts.  Patient denies suicidal thoughts at this time.  \"Just a lot going on.\"  Reviewed ED with patient.  Patient voices understanding

## 2025-04-29 NOTE — PATIENT INSTRUCTIONS
If your physician has ordered procedures/ testing and you have not been contacted with in 2 days after your office visit,  please call our office.     If you have had your testing performed -  please allow 48 hours for our office to notify you of the results.  If you have not heard from us with in the 48 hrs,  please call the office.     Results for the 14 day and 30 day heart monitor - please allow 7-10 business days after the monitor is mailed back.    You may receive a survey regarding the care you received during your visit.  Your input is valuable to us.  We encourage you to complete and return your survey.  We hope you will choose us in the future for your healthcare needs.  Thank you for choosing [facility]!    Your Medical Assistant Today:  Anika DIXON   Your RN Today:  Peyton DIXON   Your Provider for Today: Dr. Venegas  Ph. [phone] opt 1    Have A Great Day!

## 2025-04-30 ENCOUNTER — OFFICE VISIT (OUTPATIENT)
Dept: FAMILY MEDICINE CLINIC | Age: 60
End: 2025-04-30
Payer: COMMERCIAL

## 2025-04-30 ENCOUNTER — OFFICE VISIT (OUTPATIENT)
Dept: CARDIOLOGY CLINIC | Age: 60
End: 2025-04-30
Payer: COMMERCIAL

## 2025-04-30 VITALS
WEIGHT: 275.6 LBS | DIASTOLIC BLOOD PRESSURE: 80 MMHG | HEART RATE: 66 BPM | OXYGEN SATURATION: 98 % | SYSTOLIC BLOOD PRESSURE: 165 MMHG | BODY MASS INDEX: 35.37 KG/M2 | HEIGHT: 74 IN

## 2025-04-30 VITALS
DIASTOLIC BLOOD PRESSURE: 80 MMHG | WEIGHT: 276.2 LBS | SYSTOLIC BLOOD PRESSURE: 136 MMHG | RESPIRATION RATE: 16 BRPM | BODY MASS INDEX: 35.46 KG/M2 | HEART RATE: 64 BPM

## 2025-04-30 DIAGNOSIS — R45.89 ANXIETY ABOUT HEALTH: ICD-10-CM

## 2025-04-30 DIAGNOSIS — I25.810 CORONARY ARTERY DISEASE INVOLVING CORONARY BYPASS GRAFT OF NATIVE HEART WITHOUT ANGINA PECTORIS: ICD-10-CM

## 2025-04-30 DIAGNOSIS — G89.29 CHRONIC BILATERAL LOW BACK PAIN WITH BILATERAL SCIATICA: ICD-10-CM

## 2025-04-30 DIAGNOSIS — I25.5 ISCHEMIC CARDIOMYOPATHY: ICD-10-CM

## 2025-04-30 DIAGNOSIS — E78.2 MIXED HYPERLIPIDEMIA: ICD-10-CM

## 2025-04-30 DIAGNOSIS — M54.42 CHRONIC BILATERAL LOW BACK PAIN WITH BILATERAL SCIATICA: ICD-10-CM

## 2025-04-30 DIAGNOSIS — I73.9 PAD (PERIPHERAL ARTERY DISEASE): ICD-10-CM

## 2025-04-30 DIAGNOSIS — I49.3 PVC (PREMATURE VENTRICULAR CONTRACTION): ICD-10-CM

## 2025-04-30 DIAGNOSIS — Z95.810 ICD (IMPLANTABLE CARDIOVERTER-DEFIBRILLATOR) IN PLACE: Primary | ICD-10-CM

## 2025-04-30 DIAGNOSIS — F41.3 OTHER MIXED ANXIETY DISORDERS: Primary | ICD-10-CM

## 2025-04-30 DIAGNOSIS — M54.41 CHRONIC BILATERAL LOW BACK PAIN WITH BILATERAL SCIATICA: ICD-10-CM

## 2025-04-30 DIAGNOSIS — I10 ESSENTIAL HYPERTENSION: ICD-10-CM

## 2025-04-30 DIAGNOSIS — I48.0 PAROXYSMAL ATRIAL FIBRILLATION (HCC): ICD-10-CM

## 2025-04-30 DIAGNOSIS — I70.242 ATHEROSCLEROSIS OF NATIVE ARTERIES OF LEFT LEG WITH ULCERATION OF CALF (HCC): ICD-10-CM

## 2025-04-30 DIAGNOSIS — Z95.810 ICD (IMPLANTABLE CARDIOVERTER-DEFIBRILLATOR) IN PLACE: ICD-10-CM

## 2025-04-30 DIAGNOSIS — I50.20 HFREF (HEART FAILURE WITH REDUCED EJECTION FRACTION) (HCC): ICD-10-CM

## 2025-04-30 PROCEDURE — 3079F DIAST BP 80-89 MM HG: CPT | Performed by: NURSE PRACTITIONER

## 2025-04-30 PROCEDURE — 3075F SYST BP GE 130 - 139MM HG: CPT | Performed by: NURSE PRACTITIONER

## 2025-04-30 PROCEDURE — 3079F DIAST BP 80-89 MM HG: CPT | Performed by: INTERNAL MEDICINE

## 2025-04-30 PROCEDURE — 99214 OFFICE O/P EST MOD 30 MIN: CPT | Performed by: NURSE PRACTITIONER

## 2025-04-30 PROCEDURE — 99205 OFFICE O/P NEW HI 60 MIN: CPT | Performed by: INTERNAL MEDICINE

## 2025-04-30 PROCEDURE — 3077F SYST BP >= 140 MM HG: CPT | Performed by: INTERNAL MEDICINE

## 2025-04-30 PROCEDURE — 93000 ELECTROCARDIOGRAM COMPLETE: CPT | Performed by: INTERNAL MEDICINE

## 2025-04-30 RX ORDER — OXYCODONE AND ACETAMINOPHEN 10; 325 MG/1; MG/1
1 TABLET ORAL EVERY 8 HOURS
COMMUNITY
Start: 2025-04-11

## 2025-04-30 ASSESSMENT — ENCOUNTER SYMPTOMS
SHORTNESS OF BREATH: 0
BACK PAIN: 1
COUGH: 0
NAUSEA: 0
ABDOMINAL PAIN: 0

## 2025-04-30 NOTE — PROGRESS NOTES
Coumadin, Mexitil and Amiodarone for CHF and AFIB.  Follows with [facility] EP    ECHO 2024:  Interpretation Summary    Left Ventricle: Moderately reduced left ventricular systolic function with a visually estimated EF of 30 - 35%. Left ventricle is mildly dilated. Normal wall thickness. Severe global hypokinesis present.    Left Atrium: Left atrium is mildly dilated.     No results for input(s): \"WBC\", \"HGB\", \"HCT\", \"MCV\", \"PLT\" in the last 720 hours.        ON Hydralyzine 50 mg TID,  Imdur 120 mg, Metoprolol 100 XL mg BID and Bumex 2 mg Daily.           Vitals:    04/30/25 1151   BP: 136/80   Pulse: 64   Resp: 16       On Tradjenta 5 mg, Lantus 10 units HS and Farxiga 5 mg.  Labs reviewed     On Lipitor 40 mg.     Lab Results   Component Value Date    LABA1C 5.4 02/11/2025    LABA1C 6.7 (H) 06/13/2024    LABA1C 7.2 (H) 04/06/2024     Lab Results   Component Value Date     02/11/2025         Chemistry        Component Value Date/Time     03/29/2025 1536    K 5.1 03/29/2025 1536    K 4.3 07/22/2024 2010     03/29/2025 1536    CO2 17 (L) 03/29/2025 1536    BUN 49 (H) 03/29/2025 1536    CREATININE 4.2 (HH) 03/29/2025 1536        Component Value Date/Time    CALCIUM 9.2 03/29/2025 1536    ALKPHOS 65 03/29/2025 1536    AST 15 03/29/2025 1536    ALT 7 (L) 03/29/2025 1536    BILITOT 0.3 03/29/2025 1536            Lab Results   Component Value Date    WBC 7.1 03/29/2025    HGB 8.3 (L) 03/29/2025    HCT 26.4 (L) 03/29/2025    .0 (H) 03/29/2025     03/29/2025       Immunization History   Administered Date(s) Administered    TDaP, ADACEL (age 10y-64y), BOOSTRIX (age 10y+), IM, 0.5mL 06/25/2017       Review of Systems   Constitutional:  Positive for fatigue. Negative for chills and fever.   HENT: Negative.     Respiratory:  Negative for cough and shortness of breath.    Cardiovascular:  Negative for chest pain and leg swelling.   Gastrointestinal:  Negative for abdominal pain and nausea.

## 2025-04-30 NOTE — PROGRESS NOTES
[facility] [place] [facility]  [facility] CARDIOLOGY  [address]  [address]  [facility] [address]  Dept: [phone]  Loc: [phone]   Cardiac Electrophysiology  Patient's demographics:  Date:                               4/30/2025  Patient name:               Garrett Duncan   YOB: 1965   Sex:                                 male   MRN:                               [id number]     Primary Care Physician:  Leonel Adorno, APRN - CNP      Cardiologist:  Dr Dempsey     Reason for Consultation:  Chief Complaint   Patient presents with    New Patient    Irregular Heart Beat     PVC    Cardiomyopathy        Assessment And Recommendations:  Ischemic cardiomyopathy, LVEF 25%.    2 PVC, noted on ICD, check holter to quantify. He has baseline SOB.  ECG shows PVC    3. PAF, on amiodarone, followed by Dr Dempsey    4. CKD , now on HD, has shiley catheter in place.     Clinical Summary:  60yo man w CKD on HD< ischemic cardiomyopathy who is referred for evaluation of PVC> Was noted to have PVC on ICD. He has baseline SOB. Denies palpitaitons, dizziness, syncope. LVEF 25%.     Review of systems:  Constitutional: Negative for chills and fever  HENT: Negative for congestion, sinus pressure, sneezing and sore throat.    Eyes: Negative for pain, discharge, redness and itching.   Respiratory: Negative for apnea, cough  Gastrointestinal: Negative for blood in stool, constipation, diarrhea   Endocrine: Negative for cold intolerance, heat intolerance, polydipsia.  Genitourinary: Negative for dysuria, enuresis, flank pain and hematuria.   Musculoskeletal: Negative for arthralgias, joint swelling and neck pain.   Neurological: Negative for numbness and headaches.   Psychiatric/Behavioral: Negative for agitation, confusion, decreased concentration and dysphoric mood.       Past Medical History::  Past Medical History:   Diagnosis Date    Acute systolic CHF (congestive heart failure)

## 2025-05-01 ENCOUNTER — TELEPHONE (OUTPATIENT)
Dept: CARDIOLOGY CLINIC | Age: 60
End: 2025-05-01

## 2025-05-01 ENCOUNTER — OFFICE VISIT (OUTPATIENT)
Dept: CARDIOLOGY CLINIC | Age: 60
End: 2025-05-01
Payer: COMMERCIAL

## 2025-05-01 VITALS
DIASTOLIC BLOOD PRESSURE: 80 MMHG | HEIGHT: 74 IN | OXYGEN SATURATION: 98 % | HEART RATE: 74 BPM | BODY MASS INDEX: 34.5 KG/M2 | SYSTOLIC BLOOD PRESSURE: 110 MMHG | WEIGHT: 268.8 LBS

## 2025-05-01 DIAGNOSIS — I25.5 ISCHEMIC CARDIOMYOPATHY: Primary | ICD-10-CM

## 2025-05-01 DIAGNOSIS — I48.0 PAROXYSMAL ATRIAL FIBRILLATION (HCC): ICD-10-CM

## 2025-05-01 DIAGNOSIS — R60.0 EDEMA OF BOTH LOWER LEGS: ICD-10-CM

## 2025-05-01 DIAGNOSIS — I50.20 SYSTOLIC CONGESTIVE HEART FAILURE, NYHA CLASS 2, UNSPECIFIED CONGESTIVE HEART FAILURE CHRONICITY (HCC): ICD-10-CM

## 2025-05-01 PROCEDURE — 99214 OFFICE O/P EST MOD 30 MIN: CPT | Performed by: NURSE PRACTITIONER

## 2025-05-01 PROCEDURE — 3079F DIAST BP 80-89 MM HG: CPT | Performed by: NURSE PRACTITIONER

## 2025-05-01 PROCEDURE — 3074F SYST BP LT 130 MM HG: CPT | Performed by: NURSE PRACTITIONER

## 2025-05-01 ASSESSMENT — ENCOUNTER SYMPTOMS
NAUSEA: 0
COUGH: 0
SHORTNESS OF BREATH: 0
VOMITING: 0
ABDOMINAL DISTENTION: 0

## 2025-05-01 NOTE — PATIENT INSTRUCTIONS
You may receive a survey regarding the care you received during your visit.  Your input is valuable to us.  We encourage you to complete and return your survey.  We hope you will choose us in the future for your healthcare needs.    Your nurses today were [place].  Office hours:   Mon-Thurs 8-4:30  Friday 8-12  Phone: [phone]    Continue:  Continue current medications  Daily weights and record  Fluid restriction of 2 Liters per day  Limit sodium in diet to around 0931-1544 mg/day  Monitor BP  Activity as tolerated     Call the [facility] Failure [facility] for any of the following symptoms:   Weight gain of -3 pounds in 1 day or 5 pounds in 1 week  Increased shortness of breath  Shortness of breath while laying down  Cough  Chest pain  Swelling in feet, ankles or legs  Bloating in abdomen  Fatigue

## 2025-05-01 NOTE — PROGRESS NOTES
6 month follow up.    Denies cp, sob, palpitations, dizziness, lightheaded.    C/O PEG.  
(TRADJENTA) 5 MG tablet TAKE 1 TABLET BY MOUTH DAILY 90 tablet 3    Multiple Vitamin (MULTIVITAMIN) TABS tablet Take 1 tablet by mouth daily 90 tablet 0    sennosides-docusate sodium (SENOKOT-S) 8.6-50 MG tablet Take 1 tablet by mouth daily Take an additional dose as needed for constipation 30 tablet 0    atorvastatin (LIPITOR) 40 MG tablet Take 1 tablet by mouth nightly 90 tablet 3    BRILINTA 90 MG TABS tablet TAKE 1 TABLET BY MOUTH TWICE DAILY 180 tablet 3    insulin glargine (LANTUS SOLOSTAR) 100 UNIT/ML injection pen Inject 5 Units into the skin nightly as needed (hyperglycemia-BG >140)       No current facility-administered medications for this visit.     Allergies   Allergen Reactions    Latex Rash    Ativan [Lorazepam] Hallucinations     Has taken it several times with no hallucinations.    Gabapentin Other (See Comments)     \"Jerks, tremors\"    Lidocaine Nausea And Vomiting     Make him sick to stomache    Pcn [Penicillins] Hives    Zoloft [Sertraline Hcl] Anxiety     Worsened anxiety       SUBJECTIVE:   Review of Systems   Constitutional:  Negative for activity change, appetite change, diaphoresis and fatigue.   Respiratory:  Negative for cough and shortness of breath.    Cardiovascular:  Negative for chest pain, palpitations and leg swelling.   Gastrointestinal:  Negative for abdominal distention, nausea and vomiting.   Neurological:  Negative for weakness, light-headedness and headaches.   Hematological:  Negative for adenopathy.   Psychiatric/Behavioral:  Negative for sleep disturbance.        OBJECTIVE:   Today's Vitals:  /80   Pulse 74   Ht 1.88 m (6' 2\")   Wt 121.9 kg (268 lb 12.8 oz)   SpO2 98%   BMI 34.51 kg/m²     Physical Exam  Vitals reviewed.   Constitutional:       General: He is not in acute distress.     Appearance: Normal appearance. He is well-developed. He is not diaphoretic.   HENT:      Head: Normocephalic and atraumatic.   Eyes:      Conjunctiva/sclera: Conjunctivae normal.

## 2025-05-01 NOTE — TELEPHONE ENCOUNTER
Pt states that he is still taking his bumex. Can we call nephrology to see if he is. States Dr. Kathleen added it back. Recently started on dialysis as well.

## 2025-05-02 RX ORDER — BUMETANIDE 2 MG/1
2 TABLET ORAL DAILY
COMMUNITY

## 2025-05-05 ENCOUNTER — HOSPITAL ENCOUNTER (OUTPATIENT)
Age: 60
Discharge: HOME | End: 2025-05-07
Attending: INTERNAL MEDICINE
Payer: COMMERCIAL

## 2025-05-05 DIAGNOSIS — I49.3 PVC (PREMATURE VENTRICULAR CONTRACTION): ICD-10-CM

## 2025-05-05 PROCEDURE — 93225 XTRNL ECG REC<48 HRS REC: CPT

## 2025-05-05 RX ORDER — TICAGRELOR 90 MG/1
90 TABLET ORAL 2 TIMES DAILY
Qty: 60 TABLET | Refills: 0 | Status: SHIPPED | OUTPATIENT
Start: 2025-05-05

## 2025-05-08 ENCOUNTER — OFFICE VISIT (OUTPATIENT)
Dept: CARDIOLOGY CLINIC | Age: 60
End: 2025-05-08
Payer: COMMERCIAL

## 2025-05-08 VITALS
SYSTOLIC BLOOD PRESSURE: 136 MMHG | HEIGHT: 74 IN | BODY MASS INDEX: 33.88 KG/M2 | DIASTOLIC BLOOD PRESSURE: 72 MMHG | HEART RATE: 72 BPM | WEIGHT: 264 LBS

## 2025-05-08 DIAGNOSIS — I25.708 CORONARY ARTERY DISEASE OF BYPASS GRAFT OF NATIVE HEART WITH STABLE ANGINA PECTORIS: ICD-10-CM

## 2025-05-08 DIAGNOSIS — I49.3 PVC (PREMATURE VENTRICULAR CONTRACTION): Primary | ICD-10-CM

## 2025-05-08 DIAGNOSIS — I25.5 ISCHEMIC CARDIOMYOPATHY: ICD-10-CM

## 2025-05-08 DIAGNOSIS — Z95.810 ICD (IMPLANTABLE CARDIOVERTER-DEFIBRILLATOR) IN PLACE: ICD-10-CM

## 2025-05-08 DIAGNOSIS — I48.21 PERMANENT ATRIAL FIBRILLATION (HCC): ICD-10-CM

## 2025-05-08 PROCEDURE — 99215 OFFICE O/P EST HI 40 MIN: CPT | Performed by: NUCLEAR MEDICINE

## 2025-05-08 PROCEDURE — 3078F DIAST BP <80 MM HG: CPT | Performed by: NUCLEAR MEDICINE

## 2025-05-08 PROCEDURE — 3075F SYST BP GE 130 - 139MM HG: CPT | Performed by: NUCLEAR MEDICINE

## 2025-05-08 RX ORDER — CIPROFLOXACIN 500 MG/1
500 TABLET, FILM COATED ORAL DAILY
COMMUNITY
Start: 2025-04-30

## 2025-05-08 NOTE — PROGRESS NOTES
[facility] [place] [facility]  [facility] CARDIOLOGY  [address]  [address]  [facility] [address]  Dept: [phone]  Dept Fax: [phone]  Loc: [phone]    Visit Date: 5/8/2025    Garrett Duncan is a 59 y.o. male who presents todayfor:  Chief Complaint   Patient presents with    Follow-up    Cardiomyopathy    Coronary Artery Disease    Hypertension    Hyperlipidemia    Atrial Fibrillation     Ended up on dialysis   He is seeing EP for frequent PVC  Event monitor is pending   Doesn't feel the PVCs  No ICD shocks  Known low EF   Chronic CHF  BP is stable  No dizziness  No syncope  A fib is stable  No bleeding issues   Already on amiodarone  No bleeding issues   Had recent PVd work up     HPI:  HPI  Past Medical History:   Diagnosis Date    Acute systolic CHF (congestive heart failure) ([facility]) 07/16/2015    Alcohol abuse     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    CAD (coronary artery disease) 03/25/2014    s/p CABG in may 2014    Chronic kidney disease     Diabetes mellitus (HCC)     Drug abuse ([facility])     hx of cacaine abuse, stopped abusing drugs in 2012    GERD (gastroesophageal reflux disease)     History of implantable cardiac defibrillator (ICD)     Hypertension     Intradural extramedullary spinal tumor     Long term (current) use of anticoagulants 08/29/2019    [employer] dual icd      Neuromuscular disorder ([facility])     Paroxysmal atrial fibrillation ([facility]) 07/22/2014    Prolonged emergence from general anesthesia     from CABG surgery    Severe obesity (BMI 35.0-39.9) with comorbidity ([facility]) 05/22/2023    Spinal stenosis     V-tach ([facility])     s/p ablation in dec 2014      Past Surgical History:   Procedure Laterality Date    [facility]    CARDIAC DEFIBRILLATOR PLACEMENT  10/13/2015    [employer] [place], MRI CONDITIONAL ICD    CARDIAC ELECTROPHYSIOLOGY STUDY AND ABLATION      CORONARY ARTERY BYPASS GRAFT  05/09/2014    3 bypass    EKG 12-LEAD

## 2025-05-11 LAB
ACQUISITION DURATION: NORMAL S
AVERAGE HEART RATE: 65 BPM
HOOKUP DATE: NORMAL
HOOKUP TIME: NORMAL
MAX HEART RATE TIME/DATE: NORMAL
MAX HEART RATE: 82 BPM
MIN HEART RATE TIME/DATE: NORMAL
MIN HEART RATE: 52 BPM
NUMBER OF QRS COMPLEXES: NORMAL
NUMBER OF SUPRAVENTRICULAR COUPLETS: 0
NUMBER OF SUPRAVENTRICULAR ECTOPICS: NORMAL
NUMBER OF SUPRAVENTRICULAR ISOLATED BEATS: NORMAL
NUMBER OF VENTRICULAR BIGEMINAL CYCLES: 15
NUMBER OF VENTRICULAR COUPLETS: 36
NUMBER OF VENTRICULAR ECTOPICS: 5884

## 2025-05-13 NOTE — TELEPHONE ENCOUNTER
Garrett Duncan called requesting a refill on the following medications:  Requested Prescriptions     Pending Prescriptions Disp Refills    ticagrelor (BRILINTA) 90 MG TABS tablet 60 tablet 0     Sig: Take 1 tablet by mouth 2 times daily     Pharmacy verified: [address]  .pv      Date of last visit: 5/08/2025  Date of next visit (if applicable): 5/07/2026      Pt is requesting a 90 day supply

## 2025-05-19 ENCOUNTER — TELEPHONE (OUTPATIENT)
Dept: CARDIOLOGY CLINIC | Age: 60
End: 2025-05-19

## 2025-05-19 NOTE — TELEPHONE ENCOUNTER
Dual ICD reached RRT as of 5-17-25; has appt to see Dr. Venegas on 5-21-25; pt care coord note states to call pt around 4pm - will call then to notify pt

## 2025-05-20 NOTE — PATIENT INSTRUCTIONS
@[email or web address]@    [facility]   [phone] Opt 2    Patient Instructions- Pacemaker/ ICD/ EP Procedures    Patient: Garrett Duncan  Procedure: ICD Generator Change  Date of Procedure: Friday 6/6/25  Arrival Time: 0700    Follow up Appointments:  Mon 6/16/25 Pacemaker Clinic at 3:30 PM    Diet:   Nothing to eat or drink after midnight the night before your procedure is scheduled.  You may take your AM medications with a small sip of water the morning of the procedure (unless directed otherwise)  Medications:    Medications to Hold: Brilinta 5 days prior, Coumadin 1 day prior (have INR checked with [facility] 2-3 days before. We will notify [facility] of this), HOLD Insulin, Tradjenda the day of the procedure  Continue on your regular medications unless otherwise instructed by the office.  Please notify us of ANY change in Allergies or Medications.  If you are a diabetic, do NOT take your diabetes medications the day of the procedure.   If you are using a CPAP, please bring your machine with you to the hospital.   Admission:   Please report to the second floor - [facility].   Please BRING YOUR ACTUAL BOTTLE OF MEDICATIONS with you to the hospital.   Bring your Photo ID & Insurance Cards.   Bring an overnight bag with pajamas, robe, toiletry items in case you spend the night, as most procedures do require a 23 hour stay.   Our staff will take care of any authorizations/ Pre- auth needs for your procedure if required.    Your Medical Assistant Today:  Anika DIXON   Your RN Today:  Peyton DIXON   Your Provider for Today: Dr. Venegas  Ph. [phone] opt 1    Have A Great Day!         Patient will like a call to discuss a medication dosage that was suppose to be increased but he does not remember the name of the medication. Please call to discuss.

## 2025-05-21 ENCOUNTER — OFFICE VISIT (OUTPATIENT)
Dept: CARDIOLOGY CLINIC | Age: 60
End: 2025-05-21
Payer: COMMERCIAL

## 2025-05-21 ENCOUNTER — TELEPHONE (OUTPATIENT)
Dept: CARDIOLOGY CLINIC | Age: 60
End: 2025-05-21

## 2025-05-21 VITALS
DIASTOLIC BLOOD PRESSURE: 71 MMHG | WEIGHT: 270.8 LBS | SYSTOLIC BLOOD PRESSURE: 147 MMHG | HEIGHT: 74 IN | BODY MASS INDEX: 34.75 KG/M2 | OXYGEN SATURATION: 98 % | HEART RATE: 63 BPM

## 2025-05-21 DIAGNOSIS — I25.5 ISCHEMIC CARDIOMYOPATHY: ICD-10-CM

## 2025-05-21 DIAGNOSIS — Z45.02 ELECTIVE REPLACEMENT INDICATED FOR IMPLANTABLE CARDIOVERTER-DEFIBRILLATOR (ICD): ICD-10-CM

## 2025-05-21 DIAGNOSIS — Z95.810 ICD (IMPLANTABLE CARDIOVERTER-DEFIBRILLATOR) IN PLACE: ICD-10-CM

## 2025-05-21 DIAGNOSIS — I49.3 PVC (PREMATURE VENTRICULAR CONTRACTION): Primary | ICD-10-CM

## 2025-05-21 PROCEDURE — 99214 OFFICE O/P EST MOD 30 MIN: CPT | Performed by: INTERNAL MEDICINE

## 2025-05-21 PROCEDURE — 3078F DIAST BP <80 MM HG: CPT | Performed by: INTERNAL MEDICINE

## 2025-05-21 PROCEDURE — 93000 ELECTROCARDIOGRAM COMPLETE: CPT | Performed by: INTERNAL MEDICINE

## 2025-05-21 PROCEDURE — 3077F SYST BP >= 140 MM HG: CPT | Performed by: INTERNAL MEDICINE

## 2025-05-21 NOTE — TELEPHONE ENCOUNTER
ICD Gen Change scheduled Friday 6/6 Arrival time of 0700  V. O. From Dr Venegas-Please agree:  Hold Brilinta 5 days prior, Hold Coumadin 1 day prior. INR check 2-3 days prior to procedure.   Pt given written and verbal instructions at time of visit.   Scheduled with Anatoliy in Cath Lab.  Geovanna with [employer] notified.  F/u appt scheduled with Pacer Clinic.     Called and spoke with Hannah at [facility].  She will arrange for an INR check 2-3 days prior to procedure scheduled 6/6. Aware patient to hold Coumadin x 1 day prior.  She states his last INR was 4/24 and it was 2.7.  She was given fax # [phone] and call back #[phone] opt #1.       After patient left his appointment it was noted there was a typo on wrap up instructions on AVS, date for procedure read Friday 6/9. It has been corrected to Friday 6/6. Call to patient to make sure he corrects the date on his paperwork. Patient expressed understanding.

## 2025-05-21 NOTE — PROGRESS NOTES
03/29/2025    CO2 17 (L) 03/29/2025    BUN 49 (H) 03/29/2025    CREATININE 4.2 (HH) 03/29/2025    GLUCOSE 108 03/29/2025    CALCIUM 9.2 03/29/2025    BILITOT 0.3 03/29/2025    ALKPHOS 65 03/29/2025    AST 15 03/29/2025    ALT 7 (L) 03/29/2025    LABGLOM 15 (A) 03/29/2025     Lab Results   Component Value Date    WBC 7.1 03/29/2025    HGB 8.3 (L) 03/29/2025    HCT 26.4 (L) 03/29/2025    .0 (H) 03/29/2025     03/29/2025      Lab Results   Component Value Date    TSH 6.370 (H) 08/07/2024       ECHO:   Results for orders placed or performed during the hospital encounter of 07/22/24   Echo (TTE) complete (PRN contrast/bubble/strain/3D)   Result Value Ref Range    Body Surface Area 2.56 m2    LV EDV A2C 285 mL    LV EDV A4C 245 mL    LV ESV A2C 204 mL    LV ESV A4C 193 mL    IVSd 1.0 0.6 - 1.0 cm    LVIDd 6.7 (A) 4.2 - 5.9 cm    LVIDs 6.1 cm    LVPWd 1.0 0.6 - 1.0 cm    LV Ejection Fraction A2C 29 %    LV Ejection Fraction A4C 21 %    EF BP 26 (A) 55 - 100 %    LA Minor Axis 7.1 cm    LA Major [place] 6.4 cm    LA Area 2C 30.8 cm2    LA Area 4C 30.2 cm2    LA Volume MOD A2C 110 (A) 18 - 58 mL    LA Volume MOD A4C 116 (A) 18 - 58 mL    LA Volume  (A) 18 - 58 mL    LA Diameter 4.7 cm    AV Cusp Mmode 2.2 cm    RVIDd 5.0 cm    TAPSE 2.0 >=1.7 cm    Fractional Shortening 2D 9 28 - 44 %    LV ESV Index A4C 78 mL/m2    LV EDV Index A4C 99 mL/m2    LV ESV Index A2C 82 mL/m2    LV EDV Index A2C 115 mL/m2    LVIDd Index 2.70 cm/m2    LVIDs Index 2.46 cm/m2    LV RWT Ratio 0.30     LV Mass 2D 298.2 (A) 88 - 224 g    LV Mass 2D Index 120.3 (A) 49 - 115 g/m2    LA Volume Index BP 48 (A) 16 - 34 ml/m2    LA Volume Index MOD A2C 44 (A) 16 - 34 ml/m2    LA Volume Index MOD A4C 47 (A) 16 - 34 ml/m2    LA Size Index 1.90 cm/m2    Narrative      Left Ventricle: Severely reduced left ventricular systolic function   with a visually estimated EF of 25%. Left ventricle is moderately dilated.   Normal wall thickness.

## 2025-05-23 PROBLEM — Z45.02 ELECTIVE REPLACEMENT INDICATED FOR IMPLANTABLE CARDIOVERTER-DEFIBRILLATOR (ICD): Status: ACTIVE | Noted: 2025-05-21

## 2025-06-02 RX ORDER — ISOSORBIDE MONONITRATE 30 MG/1
30 TABLET, EXTENDED RELEASE ORAL DAILY
Qty: 90 TABLET | Refills: 3 | Status: SHIPPED | OUTPATIENT
Start: 2025-06-02

## 2025-06-04 RX ORDER — ATORVASTATIN CALCIUM 40 MG/1
40 TABLET, FILM COATED ORAL NIGHTLY
Qty: 90 TABLET | Refills: 3 | Status: SHIPPED | OUTPATIENT
Start: 2025-06-04

## 2025-06-05 ENCOUNTER — PREP FOR PROCEDURE (OUTPATIENT)
Dept: CARDIOLOGY | Age: 60
End: 2025-06-05

## 2025-06-05 RX ORDER — CHLORHEXIDINE GLUCONATE 40 MG/ML
SOLUTION TOPICAL ONCE
Status: CANCELLED | OUTPATIENT
Start: 2025-06-05 | End: 2025-06-05

## 2025-06-05 RX ORDER — SODIUM CHLORIDE 9 MG/ML
INJECTION, SOLUTION INTRAVENOUS CONTINUOUS
Status: CANCELLED | OUTPATIENT
Start: 2025-06-05

## 2025-06-05 RX ORDER — SODIUM CHLORIDE 0.9 % (FLUSH) 0.9 %
5-40 SYRINGE (ML) INJECTION PRN
Status: CANCELLED | OUTPATIENT
Start: 2025-06-05

## 2025-06-05 RX ORDER — SODIUM CHLORIDE 9 MG/ML
INJECTION, SOLUTION INTRAVENOUS PRN
Status: CANCELLED | OUTPATIENT
Start: 2025-06-05

## 2025-06-05 RX ORDER — SODIUM CHLORIDE 0.9 % (FLUSH) 0.9 %
5-40 SYRINGE (ML) INJECTION EVERY 12 HOURS SCHEDULED
Status: CANCELLED | OUTPATIENT
Start: 2025-06-05

## 2025-06-09 RX ORDER — METOPROLOL SUCCINATE 25 MG/1
25 TABLET, EXTENDED RELEASE ORAL DAILY
Qty: 90 TABLET | Refills: 3 | Status: SHIPPED | OUTPATIENT
Start: 2025-06-09

## 2025-06-10 ENCOUNTER — LAB (OUTPATIENT)
Dept: LAB | Age: 60
End: 2025-06-10

## 2025-06-10 LAB
AMPHETAMINES UR QL SCN: NEGATIVE
BARBITURATES UR QL SCN: NEGATIVE
BENZODIAZ UR QL SCN: POSITIVE
BUPRENORPHINE URINE: NEGATIVE
BZE UR QL SCN: NEGATIVE
CANNABINOIDS UR QL SCN: NEGATIVE
FENTANYL: NEGATIVE
OPIATES UR QL SCN: NEGATIVE
OXYCODONE: POSITIVE
PCP UR QL SCN: NEGATIVE

## 2025-06-10 RX ORDER — SACUBITRIL AND VALSARTAN 24; 26 MG/1; MG/1
TABLET, FILM COATED ORAL
Qty: 180 TABLET | Refills: 1 | Status: SHIPPED | OUTPATIENT
Start: 2025-06-10

## 2025-06-19 ENCOUNTER — TELEPHONE (OUTPATIENT)
Dept: CARDIOLOGY CLINIC | Age: 60
End: 2025-06-19

## 2025-06-19 NOTE — TELEPHONE ENCOUNTER
Reviewed instructions for Tuesdays change out, it's a reschedule from 6/6/25  Took a brilinta today, aware to stop it, will let Peyton know  Reviewed brilinta and coumadin instructions with him

## 2025-06-20 NOTE — PRE-PROCEDURE INSTRUCTIONS
Spoke with Patient  Procedure is scheduled for Tuesday, admission time is 1200  Please arrive 15 minutes early  You will [facility] on [facility]nd [facility]  NPO after midnight  Bring drivers license and insurance information  Wear comfortable clean clothes  Shower morning of and night before with liquid antibacterial soap  Remove jewelry   May have to stay overnight if have PTCA/stent - bring an overnight bag.  Leave valuables at home such as cash or credit cards  Bring medications in original bottles - do not take diabetic meds days of procedure.  It is OK to take BP and heart meds.  If your are on anticoagulants such as coumadin/warfarin, eliquis, xarelto or pradaxa - hold as directed by your Dr  Made aware of visitors limit to 2 at a time  Follow all instructions given by your physician  Please notify doctor office if you need to cancel or reschedule your procedure   needed at discharge

## 2025-06-23 ENCOUNTER — PREP FOR PROCEDURE (OUTPATIENT)
Dept: CARDIOLOGY | Age: 60
End: 2025-06-23

## 2025-06-23 ENCOUNTER — TELEPHONE (OUTPATIENT)
Age: 60
End: 2025-06-23

## 2025-06-23 RX ORDER — SODIUM CHLORIDE 0.9 % (FLUSH) 0.9 %
5-40 SYRINGE (ML) INJECTION EVERY 12 HOURS SCHEDULED
Status: CANCELLED | OUTPATIENT
Start: 2025-06-23

## 2025-06-23 RX ORDER — SODIUM CHLORIDE 0.9 % (FLUSH) 0.9 %
5-40 SYRINGE (ML) INJECTION PRN
Status: CANCELLED | OUTPATIENT
Start: 2025-06-23

## 2025-06-23 RX ORDER — SODIUM CHLORIDE 9 MG/ML
INJECTION, SOLUTION INTRAVENOUS PRN
Status: CANCELLED | OUTPATIENT
Start: 2025-06-23

## 2025-06-23 RX ORDER — SODIUM CHLORIDE 9 MG/ML
INJECTION, SOLUTION INTRAVENOUS CONTINUOUS
Status: CANCELLED | OUTPATIENT
Start: 2025-06-23

## 2025-06-23 NOTE — TELEPHONE ENCOUNTER
Patient no showed his testing for his vasc duplex lower extremity arteries left and vasc lower arterial complete phys dopp at rest. Attempted to call patient but no answer and unable to leave message due to voicemail being full

## 2025-06-24 ENCOUNTER — HOSPITAL ENCOUNTER (OUTPATIENT)
Age: 60
Setting detail: OUTPATIENT SURGERY
Discharge: HOME | End: 2025-06-24
Attending: INTERNAL MEDICINE | Admitting: INTERNAL MEDICINE
Payer: COMMERCIAL

## 2025-06-24 VITALS
SYSTOLIC BLOOD PRESSURE: 156 MMHG | WEIGHT: 263.01 LBS | HEIGHT: 74 IN | OXYGEN SATURATION: 97 % | BODY MASS INDEX: 33.75 KG/M2 | RESPIRATION RATE: 13 BRPM | TEMPERATURE: 98.3 F | HEART RATE: 63 BPM | DIASTOLIC BLOOD PRESSURE: 70 MMHG

## 2025-06-24 DIAGNOSIS — Z45.02 ELECTIVE REPLACEMENT INDICATED FOR IMPLANTABLE CARDIOVERTER-DEFIBRILLATOR (ICD): ICD-10-CM

## 2025-06-24 LAB
ABO GROUP BLD: NORMAL
ANION GAP SERPL CALC-SCNC: 12 MEQ/L (ref 8–16)
APTT PPP: 43.5 SECONDS (ref 22–38)
BASOPHILS ABSOLUTE: 0 THOU/MM3 (ref 0–0.1)
BASOPHILS NFR BLD AUTO: 0.7 %
BUN SERPL-MCNC: 26 MG/DL (ref 8–23)
CALCIUM SERPL-MCNC: 9.4 MG/DL (ref 8.8–10.2)
CHLORIDE SERPL-SCNC: 100 MEQ/L (ref 98–111)
CO2 SERPL-SCNC: 26 MEQ/L (ref 22–29)
CREAT SERPL-MCNC: 4.1 MG/DL (ref 0.7–1.2)
DEPRECATED RDW RBC AUTO: 56.4 FL (ref 35–45)
ECHO BSA: 2.5 M2
EKG ATRIAL RATE: 64 BPM
EKG P AXIS: 67 DEGREES
EKG P-R INTERVAL: 226 MS
EKG Q-T INTERVAL: 462 MS
EKG QRS DURATION: 100 MS
EKG QTC CALCULATION (BAZETT): 476 MS
EKG R AXIS: -9 DEGREES
EKG T AXIS: 100 DEGREES
EKG VENTRICULAR RATE: 64 BPM
EOSINOPHIL NFR BLD AUTO: 6.1 %
EOSINOPHILS ABSOLUTE: 0.3 THOU/MM3 (ref 0–0.4)
ERYTHROCYTE [DISTWIDTH] IN BLOOD BY AUTOMATED COUNT: 15.3 % (ref 11.5–14.5)
GFR SERPL CREATININE-BSD FRML MDRD: 16 ML/MIN/1.73M2
GLUCOSE SERPL-MCNC: 135 MG/DL (ref 74–109)
HCT VFR BLD AUTO: 30.1 % (ref 42–52)
HGB BLD-MCNC: 10.1 GM/DL (ref 14–18)
IAT IGG-SP REAG SERPL QL: NORMAL
IMM GRANULOCYTES # BLD AUTO: 0.01 THOU/MM3 (ref 0–0.07)
IMM GRANULOCYTES NFR BLD AUTO: 0.2 %
INR PPP: 2.03 (ref 0.85–1.13)
LYMPHOCYTES ABSOLUTE: 0.8 THOU/MM3 (ref 1–4.8)
LYMPHOCYTES NFR BLD AUTO: 13.4 %
MCH RBC QN AUTO: 33.9 PG (ref 26–33)
MCHC RBC AUTO-ENTMCNC: 33.6 GM/DL (ref 32.2–35.5)
MCV RBC AUTO: 101 FL (ref 80–94)
MONOCYTES ABSOLUTE: 0.6 THOU/MM3 (ref 0.4–1.3)
MONOCYTES NFR BLD AUTO: 9.9 %
NEUTROPHILS ABSOLUTE: 4 THOU/MM3 (ref 1.8–7.7)
NEUTROPHILS NFR BLD AUTO: 69.7 %
NRBC BLD AUTO-RTO: 0 /100 WBC
PLATELET # BLD AUTO: 78 THOU/MM3 (ref 130–400)
PLATELET BLD QL SMEAR: ABNORMAL
PMV BLD AUTO: 10.7 FL (ref 9.4–12.4)
POTASSIUM SERPL-SCNC: 4 MEQ/L (ref 3.5–5.2)
PROTHROMBIN TIME: 23.3 SECONDS (ref 10–13.5)
RBC # BLD AUTO: 2.98 MILL/MM3 (ref 4.7–6.1)
RH BLD: NORMAL
SCAN OF BLOOD SMEAR: NORMAL
SODIUM SERPL-SCNC: 138 MEQ/L (ref 135–145)
WBC # BLD AUTO: 5.7 THOU/MM3 (ref 4.8–10.8)

## 2025-06-24 PROCEDURE — 99152 MOD SED SAME PHYS/QHP 5/>YRS: CPT | Performed by: INTERNAL MEDICINE

## 2025-06-24 PROCEDURE — 2500000003 HC RX 250 WO HCPCS: Performed by: STUDENT IN AN ORGANIZED HEALTH CARE EDUCATION/TRAINING PROGRAM

## 2025-06-24 PROCEDURE — 33264 RMVL & RPLCMT DFB GEN MLT LD: CPT | Performed by: INTERNAL MEDICINE

## 2025-06-24 PROCEDURE — C1889 IMPLANT/INSERT DEVICE, NOC: HCPCS | Performed by: INTERNAL MEDICINE

## 2025-06-24 PROCEDURE — 80048 BASIC METABOLIC PNL TOTAL CA: CPT

## 2025-06-24 PROCEDURE — 86900 BLOOD TYPING SEROLOGIC ABO: CPT

## 2025-06-24 PROCEDURE — 99153 MOD SED SAME PHYS/QHP EA: CPT | Performed by: INTERNAL MEDICINE

## 2025-06-24 PROCEDURE — 85610 PROTHROMBIN TIME: CPT

## 2025-06-24 PROCEDURE — 2580000003 HC RX 258: Performed by: PHYSICIAN ASSISTANT

## 2025-06-24 PROCEDURE — 85730 THROMBOPLASTIN TIME PARTIAL: CPT

## 2025-06-24 PROCEDURE — 6360000002 HC RX W HCPCS: Performed by: PHYSICIAN ASSISTANT

## 2025-06-24 PROCEDURE — 86901 BLOOD TYPING SEROLOGIC RH(D): CPT

## 2025-06-24 PROCEDURE — 36415 COLL VENOUS BLD VENIPUNCTURE: CPT

## 2025-06-24 PROCEDURE — 6360000002 HC RX W HCPCS: Performed by: STUDENT IN AN ORGANIZED HEALTH CARE EDUCATION/TRAINING PROGRAM

## 2025-06-24 PROCEDURE — 7100000010 HC PHASE II RECOVERY - FIRST 15 MIN: Performed by: INTERNAL MEDICINE

## 2025-06-24 PROCEDURE — 7100000011 HC PHASE II RECOVERY - ADDTL 15 MIN: Performed by: INTERNAL MEDICINE

## 2025-06-24 PROCEDURE — 6360000002 HC RX W HCPCS: Performed by: INTERNAL MEDICINE

## 2025-06-24 PROCEDURE — 93010 ELECTROCARDIOGRAM REPORT: CPT | Performed by: INTERNAL MEDICINE

## 2025-06-24 PROCEDURE — 85025 COMPLETE CBC W/AUTO DIFF WBC: CPT

## 2025-06-24 PROCEDURE — C1721 AICD, DUAL CHAMBER: HCPCS | Performed by: INTERNAL MEDICINE

## 2025-06-24 PROCEDURE — 93005 ELECTROCARDIOGRAM TRACING: CPT | Performed by: PHYSICIAN ASSISTANT

## 2025-06-24 PROCEDURE — 86885 COOMBS TEST INDIRECT QUAL: CPT

## 2025-06-24 RX ORDER — SODIUM CHLORIDE 0.9 % (FLUSH) 0.9 %
5-40 SYRINGE (ML) INJECTION PRN
Status: DISCONTINUED | OUTPATIENT
Start: 2025-06-24 | End: 2025-06-24 | Stop reason: HOSPADM

## 2025-06-24 RX ORDER — SODIUM CHLORIDE 9 MG/ML
INJECTION, SOLUTION INTRAVENOUS CONTINUOUS
Status: DISCONTINUED | OUTPATIENT
Start: 2025-06-24 | End: 2025-06-24 | Stop reason: HOSPADM

## 2025-06-24 RX ORDER — SODIUM CHLORIDE 9 MG/ML
INJECTION, SOLUTION INTRAVENOUS PRN
Status: DISCONTINUED | OUTPATIENT
Start: 2025-06-24 | End: 2025-06-24 | Stop reason: HOSPADM

## 2025-06-24 RX ORDER — SODIUM CHLORIDE 9 MG/ML
INJECTION, SOLUTION INTRAVENOUS PRN
Status: DISCONTINUED | OUTPATIENT
Start: 2025-06-24 | End: 2025-06-24

## 2025-06-24 RX ORDER — SODIUM CHLORIDE 0.9 % (FLUSH) 0.9 %
5-40 SYRINGE (ML) INJECTION EVERY 12 HOURS SCHEDULED
Status: DISCONTINUED | OUTPATIENT
Start: 2025-06-24 | End: 2025-06-24 | Stop reason: HOSPADM

## 2025-06-24 RX ORDER — MIDAZOLAM HYDROCHLORIDE 1 MG/ML
INJECTION, SOLUTION INTRAMUSCULAR; INTRAVENOUS PRN
Status: DISCONTINUED | OUTPATIENT
Start: 2025-06-24 | End: 2025-06-24 | Stop reason: HOSPADM

## 2025-06-24 RX ORDER — SODIUM CHLORIDE 9 MG/ML
INJECTION, SOLUTION INTRAVENOUS CONTINUOUS
Status: DISCONTINUED | OUTPATIENT
Start: 2025-06-24 | End: 2025-06-24

## 2025-06-24 RX ORDER — CHLORHEXIDINE GLUCONATE 40 MG/ML
SOLUTION TOPICAL ONCE
Status: DISCONTINUED | OUTPATIENT
Start: 2025-06-24 | End: 2025-06-24 | Stop reason: HOSPADM

## 2025-06-24 RX ORDER — FENTANYL CITRATE 50 UG/ML
INJECTION, SOLUTION INTRAMUSCULAR; INTRAVENOUS PRN
Status: DISCONTINUED | OUTPATIENT
Start: 2025-06-24 | End: 2025-06-24 | Stop reason: HOSPADM

## 2025-06-24 RX ORDER — SODIUM CHLORIDE 0.9 % (FLUSH) 0.9 %
5-40 SYRINGE (ML) INJECTION PRN
Status: DISCONTINUED | OUTPATIENT
Start: 2025-06-24 | End: 2025-06-24

## 2025-06-24 RX ADMIN — SODIUM CHLORIDE: 0.9 INJECTION, SOLUTION INTRAVENOUS at 12:48

## 2025-06-24 RX ADMIN — Medication 3000 MG: at 13:45

## 2025-06-24 RX ADMIN — CEFAZOLIN 2 G: 2 INJECTION, POWDER, FOR SOLUTION INTRAVENOUS at 15:51

## 2025-06-24 NOTE — PROGRESS NOTES
Patient admitted to 2E16  Ambulatory for ICD generator change  Patient NPO. Patient accompanied by self, sister to pick him up later and states he will have someone to stay with him tonight.  Vital signs obtained.   Assessment and data collection intiated.   Oriented to room.  Policies and procedures for 2E explained.   All questions answered with no further questions at this time.   Fall precautions reviewed with patient.     1213 Care plan reviewed with patient .  Patient   verbalize understanding of the plan of care and contribute to goal setting.     Patient has a tesseo in rt chest for dialysis, states he had dialysis yesterday.     1248 IV started at 20 ml per hour on pump since dialysis patient.     1500 to cath lab per bed, stable condition.   Dr Venegas called and informed him of labs today, especially platlets, INR, hgb, creat. Also informed of runs of svt and AVR, patient asymptomatic.     1615 care taken over from cath lab, site with dressing to left chest dry and intact. (Aquacel)    1800 up in room, tolerated activity well.     1755 discharge instructions given, voices understanding.     1812 discharged per wheelchair, stable condition.

## 2025-06-24 NOTE — DISCHARGE INSTRUCTIONS
Aquacel dressing,remove dressing on day 8, may shower as long as dressing is intact (try not to get dressing wet) Do not remove steri strips, they will fall off on their own. Use ice pack.  Do not sleep on side of device  NO powders , lotion or creams to the chest area  Monitor for infection, redness, warmth, swelling , temp greater than 101, drainage  Monitor for bleeding, call doctor if bleeding or signs of infection        [employer] booklet and temporary ID given to patient  Connect with remote monitorinrg sheet given to patient            SEDATION/ANALGESIA INFORMATION/HOME GOING ADVICE    SEDATION / ANALGESIA INFORMATION / HOME GOING ADVICE  You have received the sedation/analgesia medication during your visit     Sedation/analgesia is used during short medical procedures under controlled supervision. The medication will produce a strong relaxation. You will be able to hear, speak and follow instructions, but your memory and alertness will be decreased.     You will be able to swallow and breathe on your own. During sedation/analgesia your blood pressure, heart and breathing will be watched closely. After the procedure, you may not remember what was said or done.     You may have the following effects from the medication.  \"           Drowsiness, dizziness, sleepiness or confusion.  \"           Difficulty remembering or delayed reaction times.  \"           Loss of fine muscle control or difficulty with your balance especially while walking.  \"           Difficulty focusing or blurred vision.  You may not be aware of slight changes in your behavior and/or your reaction time because of the medication used during the procedure. Therefore you should follow these instructions.  \"           Have someone responsible help you with your care.  \"           Do not drive for 24 hours.  \"           Do not operate equipment for 24 hours (lawnmowers, power tools, kitchen accessories, stove).  \"           Do not drink any

## 2025-06-25 NOTE — H&P
JESÚS Garcia   sennosides-docusate sodium (SENOKOT-S) 8.6-50 MG tablet Take 1 tablet by mouth daily Take an additional dose as needed for constipation 8/9/24  Yes Radha Villa PA-C   insulin glargine (LANTUS SOLOSTAR) 100 UNIT/ML injection pen Inject 5 Units into the skin nightly as needed (hyperglycemia-BG >140) 8/16/23  Yes Jose Diaz MD   ciprofloxacin (CIPRO) 500 MG tablet Take 1 tablet by mouth daily for 10 days  Patient not taking: Reported on 6/24/2025 4/30/25   Provider, MD Bryn     Aspirin  [] 81 mg  [] 325 mg  [] None  Antiplatelet drug therapy use last 5 days  []No []Yes  Coumadin Use Last 5 Days []No []Yes  Other anticoagulant use last 5 days  []No []Yes  Additional information: Per medical records       Vitals:   Vitals:    06/24/25 1630   BP: (!) 154/76   Pulse: 60   Resp: 20   Temp:    SpO2: 93%       Physical Examination:   GENERAL: Alert and oriented. No distress.  EYES: No pallor or icterus.  ENT: No central cyanosis.  VESSELS: No jugular venous distension or carotid bruits.  HEART: Normal S1/S2. No murmur, rub or gallop.  LUNGS: Clear to auscultation.  ABDOMEN: Soft and non-tender.   EXTREMITIES: No lower extremity edema. Feet are warm.   NEUROLOGICAL: Grossly intact.      Procedure Plannd:   [] AF ablation [] Atrial Flutter ablation [] SVT ablation [] PVC/VT ablation [] EP study  [] Pacemaker implantation [] AICD implantation [] BiV PM/ICD implantation   [x] Generator change [] Loop recorder implantation [] Loop recorder explantation.   [] Micra leadless pacemaker implantation. [] His bundle/Left bundle pacemaker implantation.  [] Lead revision. [] Pocket Revision. [] MYRIAM. [] Cardioversion    []Other:       Planned Sedation/Analgesia:  [] Per anesthesia.  [x] Midazolam [x] Fentanyl [] Propofol [] Propofol with anesthesia team.    []Midazolam []Meperidine []Sublimaze []Morphine [] Diazepam    []Other:       ASA Classification  []1 []2 [x]3 []4 []5  Class 1: A normal healthy

## 2025-06-26 ENCOUNTER — HOSPITAL ENCOUNTER (OUTPATIENT)
Dept: INTERVENTIONAL RADIOLOGY/VASCULAR | Age: 60
Discharge: HOME | End: 2025-06-28
Attending: INTERNAL MEDICINE
Payer: COMMERCIAL

## 2025-06-26 DIAGNOSIS — N18.6 END STAGE RENAL DISEASE (HCC): ICD-10-CM

## 2025-06-26 DIAGNOSIS — Z01.818 PREOP EXAMINATION: ICD-10-CM

## 2025-06-26 DIAGNOSIS — I77.0 ARTERIOVENOUS FISTULA, ACQUIRED: ICD-10-CM

## 2025-06-26 PROCEDURE — 93986 DUP-SCAN HEMO COMPL UNI STD: CPT

## 2025-07-01 ENCOUNTER — OFFICE VISIT (OUTPATIENT)
Age: 60
End: 2025-07-01
Payer: COMMERCIAL

## 2025-07-01 ENCOUNTER — TELEPHONE (OUTPATIENT)
Age: 60
End: 2025-07-01

## 2025-07-01 VITALS
WEIGHT: 264.2 LBS | HEIGHT: 74 IN | SYSTOLIC BLOOD PRESSURE: 120 MMHG | DIASTOLIC BLOOD PRESSURE: 67 MMHG | HEART RATE: 63 BPM | BODY MASS INDEX: 33.91 KG/M2

## 2025-07-01 DIAGNOSIS — I25.5 ISCHEMIC CARDIOMYOPATHY: ICD-10-CM

## 2025-07-01 DIAGNOSIS — I70.242 ATHEROSCLEROSIS OF NATIVE ARTERY OF LEFT LOWER EXTREMITY WITH ULCERATION OF CALF (HCC): ICD-10-CM

## 2025-07-01 DIAGNOSIS — N18.6 END STAGE RENAL DISEASE (HCC): Primary | ICD-10-CM

## 2025-07-01 PROCEDURE — 3078F DIAST BP <80 MM HG: CPT | Performed by: SURGERY

## 2025-07-01 PROCEDURE — 99214 OFFICE O/P EST MOD 30 MIN: CPT | Performed by: SURGERY

## 2025-07-01 PROCEDURE — 3074F SYST BP LT 130 MM HG: CPT | Performed by: SURGERY

## 2025-07-01 ASSESSMENT — ENCOUNTER SYMPTOMS
ABDOMINAL PAIN: 0
SHORTNESS OF BREATH: 0
RHINORRHEA: 0
CHEST TIGHTNESS: 0
BACK PAIN: 1

## 2025-07-01 NOTE — TELEPHONE ENCOUNTER
Dr Back is requesting cardiac clearance to schedule patient for an AV fistula in approximately 4-6 weeks. Exact date is to be determined. Patient would hold coumadin 5 days and Brilinta 3 days prior to surgery.

## 2025-07-01 NOTE — PROGRESS NOTES
Chief Complaint   Patient presents with    New Patient         HPI: Garrett Duncan is an 60 y.o. male with ICM, EF 25% s/p ICD placement a week ago. He presents with ESRD, MWF HD. He uses a tunneled right IJV catheter. The ICD was placed in the left chest. He is right handed. He is on brilinta and warfarin. He complains of chronic right hand pain and weakness due to OA. He has a Hx of a left leg intervention for a calf ulcer 3/25. The calf wound is nearly healed; he has not gotten his duplex yet.      Review of Systems   Constitutional:  Positive for fatigue.   HENT:  Negative for rhinorrhea.    Eyes:  Negative for visual disturbance.   Respiratory:  Negative for chest tightness and shortness of breath.    Cardiovascular:  Positive for leg swelling. Negative for chest pain.   Gastrointestinal:  Negative for abdominal pain.   Genitourinary:  Negative for dysuria.   Musculoskeletal:  Positive for back pain.   Neurological:  Positive for dizziness. Negative for seizures and weakness.   Psychiatric/Behavioral:  Negative for behavioral problems and confusion.           Allergies:   Allergies   Allergen Reactions    Latex Rash    Ativan [Lorazepam] Hallucinations     Has taken it several times with no hallucinations.    Gabapentin Other (See Comments)     \"Jerks, tremors\"    Lidocaine Nausea And Vomiting     Make him sick to stomache    Pcn [Penicillins] Hives    Zoloft [Sertraline Hcl] Anxiety     Worsened anxiety         Current Outpatient Medications   Medication Sig Dispense Refill    ENTRESTO 24-26 MG per tablet TAKE 1 TABLET BY MOUTH TWICE DAILY. HOLD MORNING OF SURGERY 180 tablet 1    metoprolol succinate (TOPROL XL) 25 MG extended release tablet TAKE 1 TABLET BY MOUTH DAILY 90 tablet 3    atorvastatin (LIPITOR) 40 MG tablet TAKE 1 TABLET BY MOUTH EVERY NIGHT 90 tablet 3    isosorbide mononitrate (IMDUR) 30 MG extended release tablet TAKE 1 TABLET BY MOUTH DAILY 90 tablet 3    ticagrelor (BRILINTA) 90 MG TABS tablet

## 2025-07-08 ENCOUNTER — CLINICAL SUPPORT (OUTPATIENT)
Dept: CARDIOLOGY CLINIC | Age: 60
End: 2025-07-08

## 2025-07-08 DIAGNOSIS — Z95.810 ICD (IMPLANTABLE CARDIOVERTER-DEFIBRILLATOR) IN PLACE: Primary | ICD-10-CM

## 2025-07-08 NOTE — PROGRESS NOTES
.Aquacel dressing removed .Steri strips replaced, minor puffiness, shadow of bruising, no drainage, tender to touch.    If, at any point, there is any increased redness, swelling, increased discomfort, fever, or the incision opens up, the patient is aware to go to the emergency room

## 2025-07-10 ENCOUNTER — CLINICAL DOCUMENTATION (OUTPATIENT)
Age: 60
End: 2025-07-10

## 2025-07-10 ENCOUNTER — HOSPITAL ENCOUNTER (EMERGENCY)
Age: 60
Discharge: HOME | End: 2025-07-10
Attending: EMERGENCY MEDICINE
Payer: COMMERCIAL

## 2025-07-10 ENCOUNTER — HOSPITAL ENCOUNTER (OUTPATIENT)
Dept: INTERVENTIONAL RADIOLOGY/VASCULAR | Age: 60
Discharge: HOME | End: 2025-07-12
Payer: COMMERCIAL

## 2025-07-10 ENCOUNTER — HOSPITAL ENCOUNTER (OUTPATIENT)
Dept: INTERVENTIONAL RADIOLOGY/VASCULAR | Age: 60
End: 2025-07-10
Payer: COMMERCIAL

## 2025-07-10 ENCOUNTER — APPOINTMENT (OUTPATIENT)
Dept: CT IMAGING | Age: 60
End: 2025-07-10
Payer: COMMERCIAL

## 2025-07-10 VITALS
RESPIRATION RATE: 15 BRPM | WEIGHT: 264.2 LBS | DIASTOLIC BLOOD PRESSURE: 81 MMHG | TEMPERATURE: 98.2 F | BODY MASS INDEX: 33.92 KG/M2 | OXYGEN SATURATION: 97 % | SYSTOLIC BLOOD PRESSURE: 168 MMHG | HEART RATE: 65 BPM

## 2025-07-10 DIAGNOSIS — R79.1 SUBTHERAPEUTIC INTERNATIONAL NORMALIZED RATIO (INR): ICD-10-CM

## 2025-07-10 DIAGNOSIS — R93.89 ABNORMAL ULTRASOUND: Primary | ICD-10-CM

## 2025-07-10 DIAGNOSIS — I70.245 ATHEROSCLEROSIS OF NATIVE ARTERIES OF LEFT LEG WITH ULCERATION OF OTHER PART OF FOOT (HCC): ICD-10-CM

## 2025-07-10 DIAGNOSIS — I89.0 LYMPHEDEMA: ICD-10-CM

## 2025-07-10 LAB
ANION GAP SERPL CALC-SCNC: 13 MEQ/L (ref 8–16)
APTT PPP: 37.8 SECONDS (ref 22–38)
BASOPHILS ABSOLUTE: 0.1 THOU/MM3 (ref 0–0.1)
BASOPHILS NFR BLD AUTO: 0.9 %
BUN SERPL-MCNC: 32 MG/DL (ref 8–23)
CALCIUM SERPL-MCNC: 9.1 MG/DL (ref 8.8–10.2)
CHLORIDE SERPL-SCNC: 97 MEQ/L (ref 98–111)
CO2 SERPL-SCNC: 24 MEQ/L (ref 22–29)
CREAT SERPL-MCNC: 4.1 MG/DL (ref 0.7–1.2)
DEPRECATED RDW RBC AUTO: 57.9 FL (ref 35–45)
EOSINOPHIL NFR BLD AUTO: 5.7 %
EOSINOPHILS ABSOLUTE: 0.3 THOU/MM3 (ref 0–0.4)
ERYTHROCYTE [DISTWIDTH] IN BLOOD BY AUTOMATED COUNT: 15.3 % (ref 11.5–14.5)
GFR SERPL CREATININE-BSD FRML MDRD: 16 ML/MIN/1.73M2
GLUCOSE SERPL-MCNC: 120 MG/DL (ref 74–109)
HCT VFR BLD AUTO: 29 % (ref 42–52)
HEPARIN UNFRACTIONATED: < 0.04 U/ML (ref 0.3–0.7)
HGB BLD-MCNC: 9.5 GM/DL (ref 14–18)
IMM GRANULOCYTES # BLD AUTO: 0.01 THOU/MM3 (ref 0–0.07)
IMM GRANULOCYTES NFR BLD AUTO: 0.2 %
INR PPP: 1.42 (ref 0.85–1.13)
LYMPHOCYTES ABSOLUTE: 0.9 THOU/MM3 (ref 1–4.8)
LYMPHOCYTES NFR BLD AUTO: 15.1 %
MCH RBC QN AUTO: 33.7 PG (ref 26–33)
MCHC RBC AUTO-ENTMCNC: 32.8 GM/DL (ref 32.2–35.5)
MCV RBC AUTO: 102.8 FL (ref 80–94)
MONOCYTES ABSOLUTE: 0.7 THOU/MM3 (ref 0.4–1.3)
MONOCYTES NFR BLD AUTO: 12.4 %
NEUTROPHILS ABSOLUTE: 3.8 THOU/MM3 (ref 1.8–7.7)
NEUTROPHILS NFR BLD AUTO: 65.7 %
NRBC BLD AUTO-RTO: 0 /100 WBC
OSMOLALITY SERPL CALC.SUM OF ELEC: 276.3 MOSMOL/KG (ref 275–300)
PLATELET # BLD AUTO: 73 THOU/MM3 (ref 130–400)
PLATELET BLD QL SMEAR: ABNORMAL
PMV BLD AUTO: 11.1 FL (ref 9.4–12.4)
POTASSIUM SERPL-SCNC: 4 MEQ/L (ref 3.5–5.2)
PROTHROMBIN TIME: 16.3 SECONDS (ref 10–13.5)
RBC # BLD AUTO: 2.82 MILL/MM3 (ref 4.7–6.1)
SCAN OF BLOOD SMEAR: NORMAL
SODIUM SERPL-SCNC: 134 MEQ/L (ref 135–145)
WBC # BLD AUTO: 5.8 THOU/MM3 (ref 4.8–10.8)

## 2025-07-10 PROCEDURE — 93925 LOWER EXTREMITY STUDY: CPT

## 2025-07-10 PROCEDURE — 80048 BASIC METABOLIC PNL TOTAL CA: CPT

## 2025-07-10 PROCEDURE — 85610 PROTHROMBIN TIME: CPT

## 2025-07-10 PROCEDURE — 36415 COLL VENOUS BLD VENIPUNCTURE: CPT

## 2025-07-10 PROCEDURE — 85730 THROMBOPLASTIN TIME PARTIAL: CPT

## 2025-07-10 PROCEDURE — 85520 HEPARIN ASSAY: CPT

## 2025-07-10 PROCEDURE — 99283 EMERGENCY DEPT VISIT LOW MDM: CPT

## 2025-07-10 PROCEDURE — 85025 COMPLETE CBC W/AUTO DIFF WBC: CPT

## 2025-07-10 RX ORDER — HEPARIN SODIUM 10000 [USP'U]/100ML
5-30 INJECTION, SOLUTION INTRAVENOUS CONTINUOUS
Status: DISCONTINUED | OUTPATIENT
Start: 2025-07-10 | End: 2025-07-10

## 2025-07-10 RX ORDER — HEPARIN SODIUM 1000 [USP'U]/ML
80 INJECTION, SOLUTION INTRAVENOUS; SUBCUTANEOUS ONCE
Status: DISCONTINUED | OUTPATIENT
Start: 2025-07-10 | End: 2025-07-10

## 2025-07-10 RX ORDER — HEPARIN SODIUM 1000 [USP'U]/ML
40 INJECTION, SOLUTION INTRAVENOUS; SUBCUTANEOUS PRN
Status: DISCONTINUED | OUTPATIENT
Start: 2025-07-10 | End: 2025-07-10

## 2025-07-10 RX ORDER — HEPARIN SODIUM 1000 [USP'U]/ML
80 INJECTION, SOLUTION INTRAVENOUS; SUBCUTANEOUS PRN
Status: DISCONTINUED | OUTPATIENT
Start: 2025-07-10 | End: 2025-07-10

## 2025-07-10 ASSESSMENT — PAIN - FUNCTIONAL ASSESSMENT: PAIN_FUNCTIONAL_ASSESSMENT: NONE - DENIES PAIN

## 2025-07-11 ENCOUNTER — TELEPHONE (OUTPATIENT)
Dept: FAMILY MEDICINE CLINIC | Age: 60
End: 2025-07-11

## 2025-07-11 NOTE — ED PROVIDER NOTES
[facility]  EMERGENCY MEDICINE ATTENDING ATTESTATION      Evaluation of Garrett Duncan.   Case discussed and care plan developed with resident physician.   I agree with the resident physician documentation and plan as documented by her, except if my documentation differs.   I performed and/or participated in key or critical portion of the evaluation and management including physical examination and medical decision making.  I reviewed the medical, surgical, family and social history, medications and allergies.   I have reviewed and interpreted all available lab, radiology and ekg results available at the moment.  I have reviewed the nursing documentation.     Please see the resident physician completed note for final disposition except as documented on this attestation.   I have reviewed and interpreted all available lab, radiology and ekg results available at the moment.  Diagnosis, treatment and disposition plans were discussed and agreed upon by patient.   This transcription was electronically signed. It was dictated by use of voice recognition software and electronically transcribed. The transcription may contain errors not detected in proofreading.     I performed direct supervision and was present for the critical portion following procedures: None  Critical care time on this case: None    Electronically signed by Garrett Izquierdo MD on 7/10/25 at 10:29 PM EDT        Garrett Izquierdo MD  07/10/25 [id number]    
visit      Medical Decision Making  Garrett Duncan is a 60 y.o. male with PMHx PAD s/p left SFA balloon angioplasty (03/2025), PAF (on Coumadin) ESRD on HD, HFrEF (EF 25%) s/p AICD placement, insulin-dependent type 2 diabetes, chronic bilateral lower extremity lymphedema who presents to the emergency department after outpatient Doppler ultrasound tech noted what appeared to be an acute occlusion of the left peroneal artery and recommended he go to the ED although he is asymptomatic.  Ultrasound was scheduled to follow-up on PAD after left SFA balloon angioplasty 3 months prior into preparation for plan for patient to have an AV fistula placed for dialysis.  He denies symptoms and states that his left lower extremity feels normal without pain, coolness, or skin color changes.  He states that he takes his Coumadin regularly and never misses doses.  Vital stable at presentation.  Physical exam of left lower extremity unremarkable with no tenderness to palpation with limb appearing the same temperature, color, and size as the RLE.  Strong palpable left dorsalis pedis with Posterior tibial pulse nonpalpable but pupils was auscultated with Doppler ultrasound.  Posterior tibial pulse nonpalpable but non-thready pulse was auscultated with Doppler ultrasound.  Discussed the case with vascular surgeon on-call, Dr. Back, who wrote a note stating, \"I do not believe the patient has an \"acute\" left peroneal artery occlusion as suggested, but rather, it was not detected on US due to its diminutive size and relatively slow/poor flow seen on the angiogram from March of 2025.\"  Dr. Back determined that the patient did not have a clot present, and the patient was asymptomatic, he was discharged with recommendation from Dr. Back to attend already scheduled upcoming appointment with outpatient vascular surgery, for subtherapeutic INR of 1.4 (goal 2-3) patient was advised to call the Coumadin clinic tomorrow.  No medications were

## 2025-07-11 NOTE — ED TRIAGE NOTES
Patient to ED via intake due to being called by radiology and told positive DVT study. Pt denies symptoms at this time.

## 2025-07-11 NOTE — TELEPHONE ENCOUNTER
The patient was seen in the ED yesterday 7/10/2025 for a possible DVT but also had some abnormal labs.  The patient needs called for a follow up appt.  The patients chart states to call after 3:30pm so the patient will need called Monday afternoon.

## 2025-07-11 NOTE — PROGRESS NOTES
Called by ED regarding patient's outpatient arterial duplex. The US tech was concerned for a \"left peroneal artery acute occlusion\" and instructed the patient to head to the ED today. I was contacted just now as I type this note. On review of his angiogram from March 2025, the patient's left peroneal artery was < 1 mm diameter and ended in the distal calf. The resident from the ED who called me states there is a palpable left DP pulse. She will check with her attending to confirm doppler signals. The LLE arterial duplex shows good waveforms in the left DP and PT; unfortunately, the vascular lab did not perform ABIs or TBIs for some unknown reason. Indeed, the report was rather poorly written and did not correlate to the recent left distal SFA intervention done March of 2025.     I recommend confirming doppler signals on examination of the patient and f/u in our office. I do not believe the patient has an \"acute\" left peroneal artery occlusion as suggested, but rather, it was not detected on US due to its diminutive size and relatively slow/poor flow seen on the angiogram from March of 2025. I recommend ABIs, TBIs for more meaningful information and correlate that to the rest of the US findings that includes a patent left SFA and popliteal artery seen on today's duplex images. Discussed with the ED resident by phone.

## 2025-07-11 NOTE — DISCHARGE INSTRUCTIONS
Please follow-up for your scheduled appointment with the vascular surgeon.  Call the vascular surgery clinic tomorrow to inquire if you need a closer follow-up appointment from the one you have on August 5 at 3:15 PM with .   Dr. Back was a vascular surgeon we spoke with this evening and he is aware of your results and that he will be coming for follow-up.

## 2025-07-16 ENCOUNTER — TELEPHONE (OUTPATIENT)
Dept: FAMILY MEDICINE CLINIC | Age: 60
End: 2025-07-16

## 2025-07-16 DIAGNOSIS — I48.0 PAROXYSMAL ATRIAL FIBRILLATION (HCC): Primary | ICD-10-CM

## 2025-07-16 RX ORDER — WARFARIN SODIUM 5 MG/1
TABLET ORAL
Qty: 30 TABLET | Refills: 11 | Status: SHIPPED | OUTPATIENT
Start: 2025-07-16

## 2025-07-17 ENCOUNTER — TELEPHONE (OUTPATIENT)
Dept: CARDIOLOGY CLINIC | Age: 60
End: 2025-07-17

## 2025-07-17 NOTE — TELEPHONE ENCOUNTER
Dr Kathleen is wanting to have a PD dialysis catheter placed ASA as patient is not tolerated hemodialysis.   Asking if patient can be cleared and how long to hold Brilinta and Coumadin.

## 2025-07-18 NOTE — TELEPHONE ENCOUNTER
Spoke to patient.  He has not had stents here or any other facility in the past 6 months.  Form out for signature

## 2025-07-24 ENCOUNTER — HOSPITAL ENCOUNTER (OUTPATIENT)
Dept: INTERVENTIONAL RADIOLOGY/VASCULAR | Age: 60
Discharge: HOME | End: 2025-07-24
Attending: RADIOLOGY | Admitting: RADIOLOGY
Payer: COMMERCIAL

## 2025-07-24 VITALS
TEMPERATURE: 98.4 F | HEIGHT: 74 IN | HEART RATE: 65 BPM | BODY MASS INDEX: 34.65 KG/M2 | WEIGHT: 270 LBS | SYSTOLIC BLOOD PRESSURE: 139 MMHG | DIASTOLIC BLOOD PRESSURE: 73 MMHG | OXYGEN SATURATION: 98 % | RESPIRATION RATE: 15 BRPM

## 2025-07-24 LAB
APTT PPP: 32.6 SECONDS (ref 22–38)
DEPRECATED RDW RBC AUTO: 57.1 FL (ref 35–45)
ERYTHROCYTE [DISTWIDTH] IN BLOOD BY AUTOMATED COUNT: 14.8 % (ref 11.5–14.5)
HCT VFR BLD AUTO: 31.5 % (ref 42–52)
HGB BLD-MCNC: 10.2 GM/DL (ref 14–18)
INR PPP: 1.01 (ref 0.85–1.13)
MCH RBC QN AUTO: 34.2 PG (ref 26–33)
MCHC RBC AUTO-ENTMCNC: 32.4 GM/DL (ref 32.2–35.5)
MCV RBC AUTO: 105.7 FL (ref 80–94)
PLATELET # BLD AUTO: 116 THOU/MM3 (ref 130–400)
PMV BLD AUTO: 10.4 FL (ref 9.4–12.4)
PROTHROMBIN TIME: 11.8 SECONDS (ref 10–13.5)
RBC # BLD AUTO: 2.98 MILL/MM3 (ref 4.7–6.1)
WBC # BLD AUTO: 6.2 THOU/MM3 (ref 4.8–10.8)

## 2025-07-24 PROCEDURE — 2709999900 IR INSERT CAPD COMPLETE

## 2025-07-24 PROCEDURE — 7100000011 HC PHASE II RECOVERY - ADDTL 15 MIN: Performed by: RADIOLOGY

## 2025-07-24 PROCEDURE — 2580000003 HC RX 258: Performed by: RADIOLOGY

## 2025-07-24 PROCEDURE — 85027 COMPLETE CBC AUTOMATED: CPT

## 2025-07-24 PROCEDURE — 6360000002 HC RX W HCPCS: Performed by: RADIOLOGY

## 2025-07-24 PROCEDURE — 36415 COLL VENOUS BLD VENIPUNCTURE: CPT

## 2025-07-24 PROCEDURE — 7100000010 HC PHASE II RECOVERY - FIRST 15 MIN: Performed by: RADIOLOGY

## 2025-07-24 PROCEDURE — 2500000003 HC RX 250 WO HCPCS: Performed by: RADIOLOGY

## 2025-07-24 PROCEDURE — 85610 PROTHROMBIN TIME: CPT

## 2025-07-24 PROCEDURE — 85730 THROMBOPLASTIN TIME PARTIAL: CPT

## 2025-07-24 PROCEDURE — 49418 INSERT TUN IP CATH PERC: CPT

## 2025-07-24 RX ORDER — MIDAZOLAM HYDROCHLORIDE 2 MG/2ML
1 INJECTION, SOLUTION INTRAMUSCULAR; INTRAVENOUS ONCE
Status: DISCONTINUED | OUTPATIENT
Start: 2025-07-24 | End: 2025-07-24 | Stop reason: HOSPADM

## 2025-07-24 RX ORDER — MIDAZOLAM HYDROCHLORIDE 1 MG/ML
INJECTION, SOLUTION INTRAMUSCULAR; INTRAVENOUS PRN
Status: COMPLETED | OUTPATIENT
Start: 2025-07-24 | End: 2025-07-24

## 2025-07-24 RX ORDER — SODIUM CHLORIDE 450 MG/100ML
INJECTION, SOLUTION INTRAVENOUS CONTINUOUS
Status: DISCONTINUED | OUTPATIENT
Start: 2025-07-24 | End: 2025-07-24 | Stop reason: HOSPADM

## 2025-07-24 RX ORDER — SODIUM CHLORIDE 9 MG/ML
INJECTION, SOLUTION INTRAVENOUS CONTINUOUS PRN
Status: COMPLETED | OUTPATIENT
Start: 2025-07-24 | End: 2025-07-24

## 2025-07-24 RX ADMIN — MIDAZOLAM 1 MG: 1 INJECTION INTRAMUSCULAR; INTRAVENOUS at 08:28

## 2025-07-24 RX ADMIN — HYDROMORPHONE HYDROCHLORIDE 0.5 MG: 1 INJECTION, SOLUTION INTRAMUSCULAR; INTRAVENOUS; SUBCUTANEOUS at 08:59

## 2025-07-24 RX ADMIN — MIDAZOLAM 1 MG: 1 INJECTION INTRAMUSCULAR; INTRAVENOUS at 08:23

## 2025-07-24 RX ADMIN — SODIUM CHLORIDE: 0.45 INJECTION, SOLUTION INTRAVENOUS at 06:41

## 2025-07-24 RX ADMIN — HYDROMORPHONE HYDROCHLORIDE 0.5 MG: 1 INJECTION, SOLUTION INTRAMUSCULAR; INTRAVENOUS; SUBCUTANEOUS at 08:28

## 2025-07-24 RX ADMIN — MIDAZOLAM 0.5 MG: 1 INJECTION INTRAMUSCULAR; INTRAVENOUS at 08:59

## 2025-07-24 RX ADMIN — MIDAZOLAM 1 MG: 1 INJECTION INTRAMUSCULAR; INTRAVENOUS at 08:42

## 2025-07-24 RX ADMIN — HYDROMORPHONE HYDROCHLORIDE 0.5 MG: 1 INJECTION, SOLUTION INTRAMUSCULAR; INTRAVENOUS; SUBCUTANEOUS at 08:23

## 2025-07-24 RX ADMIN — WATER 2000 MG: 1 INJECTION INTRAMUSCULAR; INTRAVENOUS; SUBCUTANEOUS at 07:31

## 2025-07-24 RX ADMIN — MIDAZOLAM 1 MG: 1 INJECTION INTRAMUSCULAR; INTRAVENOUS at 08:46

## 2025-07-24 RX ADMIN — SODIUM CHLORIDE 250 ML: 9 INJECTION, SOLUTION INTRAVENOUS at 08:55

## 2025-07-24 NOTE — DISCHARGE INSTRUCTIONS
INSERTION OF CAPD DISCHARGE INSTRUCTION SHEET    Diet: As before  Care of catheter site:  Keep dressing clean and dry  Ice to catheter site for next 4 hours (20 minutes every hour)  Limit activity to surgical site (no pushing, pulling, or lifting) for next 2 days  Sponge bath only until dialysis approves showers.  Steri strips will fall off on their own - do not remove  No driving today    Return to nearest Emergency Room if any of the following:  Symptoms of bleeding, drainage, swelling  Increase in pain  Elevated temperature over 101 degrees    If you have any problems call your doctor or return to the nearest Emergency Room.    May start taking your Coumadin and Brilinta tomorrow, otherwise take your medications as normal.

## 2025-07-24 NOTE — PROGRESS NOTES
0624 Pt arrived ambulatory to [facility] with family.   0757 Pt to IR via bed with transport.   0915 Pt returned from IR, monitor attached.   1032 Pt discharged in stable condition via wheelchair with all  belongings. Skin pink, warm, dry. Sister to drive pt home. No complaints voiced at time of discharge.

## 2025-07-24 NOTE — PROGRESS NOTES
Returned to 2E17.  Monitor attached showing SR with freg PVCs.  Dressing to left abdomen dry and intact.  0.45 infusing with approx 700 ml remaining

## 2025-07-24 NOTE — H&P
[facility] [place]  Sedation/Analgesia History & Physical    Pt Name: Garrett Duncan  MRN: [id number]  YOB: 1965  Provider Performing Procedure: Narendra Sutton MD, MD  Primary Care Physician: Leonel Adorno APRN - [facility]    Formulation and discussion of sedation / procedure plans, risks, benefits, side effects and alternatives with patient and/or responsible adult completed.    PRE-PROCEDURE   DNR-CCA/DNR-CC []Yes [x]No  Brief History/Pre-Procedure Diagnosis: Renal failure          MEDICAL HISTORY  []CAD/Valve  []Liver Disease  []Lung Disease []Diabetes  []Hypertension []Renal Disease  [x]Additional information:       has a past medical history of Acute systolic CHF (congestive heart failure) (HCC), Alcohol abuse, Anomalous origin of right coronary artery, CAD (coronary artery disease), Chronic kidney disease, Diabetes mellitus (HCC), Drug abuse (HCC), GERD (gastroesophageal reflux disease), Hemodialysis patient, History of implantable cardiac defibrillator (ICD), Hypertension, Intradural extramedullary spinal tumor, Long term (current) use of anticoagulants, Medtronic dual icd , Neuromuscular disorder (HCC), Paroxysmal atrial fibrillation (HCC), Prolonged emergence from general anesthesia, Severe obesity (BMI 35.0-39.9) with comorbidity (HCC), Spinal stenosis, and V-tach (HCC).    SURGICAL HISTORY   has a past surgical history that includes Cardiac catheterization (03/20/2014); Coronary artery bypass graft (05/09/2014); other surgical history (Left, 10/21/2014); EKG 12 Lead (07/17/2015); Cardiac defibrillator placement (10/13/2015); vascular surgery; pacemaker placement; pr laminectomy w/o ffd > 2 vert seg lumbar (N/A, 01/16/2018); Cardiac electrophysiology study and ablation; Pacer Interrogate (3/8/2024); and ep device procedure (N/A, 6/24/2025).  Additional information:       ALLERGIES   Allergies as of 07/24/2025 - Fully Reviewed 07/24/2025   Allergen Reaction Noted    Latex Rash

## 2025-07-24 NOTE — OP NOTE
Department of Radiology  Post Procedure Progress Note      Pre-Procedure Diagnosis:  Renal failure    Procedure Performed:  CAPD placement    Anesthesia: local / versed and dilaudid    Findings: successful    Immediate Complications:  None    Estimated Blood Loss: minimal    SEE DICTATED PROCEDURE NOTE FOR COMPLETE DETAILS.    Electronically signed by Narendra Sutton MD on 7/24/2025 at 9:03 AM

## 2025-07-24 NOTE — PROGRESS NOTES
0800 Patient received in IR for peritoneal dialysis catheter placement  0807 This procedure has been fully reviewed with the patient and written informed consent has been obtained.  0823 1mg versed, 0.5mg dilaudid  0828 1mg versed, 0.5mg dilaudid  0939 Procedure started with   0842 1mg versed.  0846 1mg versed.  0850 Access to peritoneal cavity with use of sonosite.  0852 1mg versed, 0.5mg dilaudid  0859 0.5mg versed, 0.5mg dilaudid  0902 Procedure completed; patient tolerated well. Dressing to LLQ, gauze and opsite; no bleeding noted.  0911 Patient on bed; comfort ensured.  0911 Patient taken to 2E via bed/transport. Pt alert and oriented x3; follows commands. Skin pink, warm, and dry. Respirations easy, regular, and nonlabored.

## 2025-07-24 NOTE — PROGRESS NOTES
Patient c/o \"severe abd pain\", patient took home dose of percocet.  Sitting quietly in bed eating breakfast.

## 2025-07-24 NOTE — PROGRESS NOTES
TRANSFER - OUT REPORT:    Verbal report given to Sultana RN(name) on Garrett Duncan being transferred to (unit) for routine progression of patient care       Report consisted of patient's Situation, Background, Assessment and   Recommendations(SBAR).     Information from the following report(s) Nurse Handoff Report, Surgery Report, and MAR was reviewed with the receiving nurse.    Opportunity for questions and clarification was provided.      Patient transported with:   Monitor

## 2025-08-05 ENCOUNTER — OFFICE VISIT (OUTPATIENT)
Age: 60
End: 2025-08-05
Payer: COMMERCIAL

## 2025-08-05 VITALS
HEART RATE: 68 BPM | BODY MASS INDEX: 34.32 KG/M2 | WEIGHT: 267.4 LBS | HEIGHT: 74 IN | DIASTOLIC BLOOD PRESSURE: 68 MMHG | SYSTOLIC BLOOD PRESSURE: 112 MMHG

## 2025-08-05 DIAGNOSIS — I73.9 PAD (PERIPHERAL ARTERY DISEASE): ICD-10-CM

## 2025-08-05 DIAGNOSIS — N18.6 END STAGE RENAL DISEASE (HCC): ICD-10-CM

## 2025-08-05 DIAGNOSIS — I73.9 CLAUDICATION: Primary | ICD-10-CM

## 2025-08-05 PROCEDURE — 3074F SYST BP LT 130 MM HG: CPT | Performed by: SURGERY

## 2025-08-05 PROCEDURE — 3078F DIAST BP <80 MM HG: CPT | Performed by: SURGERY

## 2025-08-05 PROCEDURE — 99214 OFFICE O/P EST MOD 30 MIN: CPT | Performed by: SURGERY

## 2025-08-05 ASSESSMENT — ENCOUNTER SYMPTOMS
SHORTNESS OF BREATH: 0
RHINORRHEA: 0
ABDOMINAL PAIN: 0
CHEST TIGHTNESS: 0

## 2025-08-10 PROCEDURE — 99283 EMERGENCY DEPT VISIT LOW MDM: CPT

## 2025-08-11 ENCOUNTER — HOSPITAL ENCOUNTER (EMERGENCY)
Age: 60
Discharge: HOME | End: 2025-08-11
Payer: COMMERCIAL

## 2025-08-11 VITALS
OXYGEN SATURATION: 97 % | WEIGHT: 267 LBS | HEART RATE: 80 BPM | HEIGHT: 75 IN | DIASTOLIC BLOOD PRESSURE: 66 MMHG | TEMPERATURE: 98 F | RESPIRATION RATE: 14 BRPM | SYSTOLIC BLOOD PRESSURE: 118 MMHG | BODY MASS INDEX: 33.2 KG/M2

## 2025-08-11 DIAGNOSIS — F41.9 ANXIETY DISORDER, UNSPECIFIED TYPE: ICD-10-CM

## 2025-08-11 DIAGNOSIS — F41.0 PANIC ATTACK: Primary | ICD-10-CM

## 2025-08-11 LAB
EKG Q-T INTERVAL: 404 MS
EKG QRS DURATION: 82 MS
EKG QTC CALCULATION (BAZETT): 460 MS
EKG R AXIS: -12 DEGREES
EKG T AXIS: 107 DEGREES
EKG VENTRICULAR RATE: 78 BPM

## 2025-08-11 PROCEDURE — 93010 ELECTROCARDIOGRAM REPORT: CPT | Performed by: NUCLEAR MEDICINE

## 2025-08-11 PROCEDURE — 93005 ELECTROCARDIOGRAM TRACING: CPT | Performed by: PHYSICIAN ASSISTANT

## 2025-08-11 ASSESSMENT — PAIN SCALES - GENERAL: PAINLEVEL_OUTOF10: 0

## 2025-08-11 ASSESSMENT — ENCOUNTER SYMPTOMS
SHORTNESS OF BREATH: 0
VOMITING: 0
NAUSEA: 0
DIARRHEA: 0
ABDOMINAL PAIN: 0
COUGH: 0

## 2025-08-11 ASSESSMENT — PAIN - FUNCTIONAL ASSESSMENT: PAIN_FUNCTIONAL_ASSESSMENT: 0-10

## 2025-08-15 DIAGNOSIS — E11.22 CONTROLLED TYPE 2 DIABETES MELLITUS WITH CHRONIC KIDNEY DISEASE, WITHOUT LONG-TERM CURRENT USE OF INSULIN, UNSPECIFIED CKD STAGE (HCC): ICD-10-CM

## 2025-08-15 RX ORDER — INSULIN GLARGINE 100 [IU]/ML
5 INJECTION, SOLUTION SUBCUTANEOUS NIGHTLY PRN
Qty: 5 ADJUSTABLE DOSE PRE-FILLED PEN SYRINGE | Refills: 3 | Status: SHIPPED | OUTPATIENT
Start: 2025-08-15

## 2025-08-20 ENCOUNTER — TELEPHONE (OUTPATIENT)
Dept: FAMILY MEDICINE CLINIC | Age: 60
End: 2025-08-20

## 2025-08-21 ENCOUNTER — OFFICE VISIT (OUTPATIENT)
Dept: FAMILY MEDICINE CLINIC | Age: 60
End: 2025-08-21
Payer: COMMERCIAL

## 2025-08-21 VITALS
DIASTOLIC BLOOD PRESSURE: 74 MMHG | HEART RATE: 68 BPM | WEIGHT: 266 LBS | SYSTOLIC BLOOD PRESSURE: 132 MMHG | BODY MASS INDEX: 33.25 KG/M2 | RESPIRATION RATE: 16 BRPM

## 2025-08-21 DIAGNOSIS — I73.9 PAD (PERIPHERAL ARTERY DISEASE): ICD-10-CM

## 2025-08-21 DIAGNOSIS — N18.5 STAGE 5 CHRONIC KIDNEY DISEASE NOT ON CHRONIC DIALYSIS (HCC): ICD-10-CM

## 2025-08-21 DIAGNOSIS — T14.8XXD WOUND HEALING, DELAYED: Primary | ICD-10-CM

## 2025-08-21 PROCEDURE — 3078F DIAST BP <80 MM HG: CPT | Performed by: NURSE PRACTITIONER

## 2025-08-21 PROCEDURE — 3075F SYST BP GE 130 - 139MM HG: CPT | Performed by: NURSE PRACTITIONER

## 2025-08-21 PROCEDURE — 99213 OFFICE O/P EST LOW 20 MIN: CPT | Performed by: NURSE PRACTITIONER

## 2025-08-21 SDOH — ECONOMIC STABILITY: FOOD INSECURITY: WITHIN THE PAST 12 MONTHS, YOU WORRIED THAT YOUR FOOD WOULD RUN OUT BEFORE YOU GOT MONEY TO BUY MORE.: NEVER TRUE

## 2025-08-21 SDOH — ECONOMIC STABILITY: FOOD INSECURITY: WITHIN THE PAST 12 MONTHS, THE FOOD YOU BOUGHT JUST DIDN'T LAST AND YOU DIDN'T HAVE MONEY TO GET MORE.: NEVER TRUE

## 2025-08-21 ASSESSMENT — PATIENT HEALTH QUESTIONNAIRE - PHQ9
1. LITTLE INTEREST OR PLEASURE IN DOING THINGS: NOT AT ALL
3. TROUBLE FALLING OR STAYING ASLEEP: NOT AT ALL
5. POOR APPETITE OR OVEREATING: NOT AT ALL
4. FEELING TIRED OR HAVING LITTLE ENERGY: NOT AT ALL
9. THOUGHTS THAT YOU WOULD BE BETTER OFF DEAD, OR OF HURTING YOURSELF: NOT AT ALL
6. FEELING BAD ABOUT YOURSELF - OR THAT YOU ARE A FAILURE OR HAVE LET YOURSELF OR YOUR FAMILY DOWN: NOT AT ALL
2. FEELING DOWN, DEPRESSED OR HOPELESS: NOT AT ALL
10. IF YOU CHECKED OFF ANY PROBLEMS, HOW DIFFICULT HAVE THESE PROBLEMS MADE IT FOR YOU TO DO YOUR WORK, TAKE CARE OF THINGS AT HOME, OR GET ALONG WITH OTHER PEOPLE: NOT DIFFICULT AT ALL
SUM OF ALL RESPONSES TO PHQ QUESTIONS 1-9: 0
8. MOVING OR SPEAKING SO SLOWLY THAT OTHER PEOPLE COULD HAVE NOTICED. OR THE OPPOSITE, BEING SO FIGETY OR RESTLESS THAT YOU HAVE BEEN MOVING AROUND A LOT MORE THAN USUAL: NOT AT ALL
SUM OF ALL RESPONSES TO PHQ QUESTIONS 1-9: 0
7. TROUBLE CONCENTRATING ON THINGS, SUCH AS READING THE NEWSPAPER OR WATCHING TELEVISION: NOT AT ALL

## 2025-08-21 ASSESSMENT — ENCOUNTER SYMPTOMS
NAUSEA: 0
SHORTNESS OF BREATH: 0
COUGH: 0
ABDOMINAL PAIN: 0
BACK PAIN: 1

## 2025-08-27 ENCOUNTER — TELEPHONE (OUTPATIENT)
Dept: CARDIOLOGY CLINIC | Age: 60
End: 2025-08-27

## 2025-08-29 DIAGNOSIS — F41.3 OTHER MIXED ANXIETY DISORDERS: ICD-10-CM

## 2025-08-29 RX ORDER — ALPRAZOLAM 0.5 MG
TABLET ORAL
Qty: 30 TABLET | Refills: 5 | Status: SHIPPED | OUTPATIENT
Start: 2025-08-29 | End: 2026-02-25

## 2025-09-02 ENCOUNTER — HOSPITAL ENCOUNTER (OUTPATIENT)
Dept: INTERVENTIONAL RADIOLOGY/VASCULAR | Age: 60
Discharge: HOME | End: 2025-09-04
Attending: SURGERY
Payer: COMMERCIAL

## 2025-09-02 DIAGNOSIS — I73.9 CLAUDICATION: ICD-10-CM

## 2025-09-02 PROCEDURE — 93922 UPR/L XTREMITY ART 2 LEVELS: CPT

## 2025-09-02 RX ORDER — METOPROLOL SUCCINATE 25 MG/1
25 TABLET, EXTENDED RELEASE ORAL DAILY
Qty: 90 TABLET | Refills: 3 | Status: SHIPPED | OUTPATIENT
Start: 2025-09-02

## 2025-09-03 ENCOUNTER — TELEPHONE (OUTPATIENT)
Dept: FAMILY MEDICINE CLINIC | Age: 60
End: 2025-09-03

## (undated) DEVICE — 4-PORT MANIFOLD: Brand: NEPTUNE 2

## (undated) DEVICE — OIL CARTRIDGE: Brand: CORE, MAESTRO

## (undated) DEVICE — PACK PROCEDURE SURG SET UP SRMC

## (undated) DEVICE — SYRINGE,TOOMEY,IRRIGATION,70CC,STERILE: Brand: MEDLINE

## (undated) DEVICE — TRAY PREP DRY W/ PREM GLV 2 APPL 6 SPNG 2 UNDPD 1 OVERWRAP

## (undated) DEVICE — CODMAN® SURGICAL PATTIES 1/2" X1 1/2" (1.27CM X 3.81CM): Brand: CODMAN®

## (undated) DEVICE — ARM CRADLE: Brand: DEVON

## (undated) DEVICE — SUREFIT, DUAL DISPERSIVE ELECTRODE, CONTACT QUALITY MONITOR: Brand: SUREFIT

## (undated) DEVICE — CONMED DISPOSABLE BIPOLAR CABLE, 10' (3.05M): Brand: CONMED

## (undated) DEVICE — DRESSING GRMCDL 6 12FR D1N CNTR HOLE 4MM ANTMCRBL PRTCTVE DI

## (undated) DEVICE — 3M™ TEGADERM™ TRANSPARENT FILM DRESSING FRAME STYLE, 1626W, 4 IN X 4-3/4 IN (10 CM X 12 CM), 50/CT 4CT/CASE: Brand: 3M™ TEGADERM™

## (undated) DEVICE — 3M™ BAIR HUGGER® MULTI ACCESS BLANKET, PEDIATRIC, FULL BODY, 10 PER CASE 31000: Brand: BAIR HUGGER™

## (undated) DEVICE — DRAPE C ARM W36XL30IN RECTANG BND BG AND TAPE

## (undated) DEVICE — 3M™ WARMING BLANKET, UPPER BODY, 10 PER CASE, 42268: Brand: BAIR HUGGER™

## (undated) DEVICE — STOCKINETTE,IMPERVIOUS,12X48,STERILE: Brand: MEDLINE

## (undated) DEVICE — PROBE STIM 3 MM FOR PEDCL SCREW DISP

## (undated) DEVICE — AGENT HEMSTAT W2XL14IN OXIDIZED REGENERATED CELOS ABSRB FOR

## (undated) DEVICE — BIPOLAR SEALER 23-112-1 AQM 6.0: Brand: AQUAMANTYS ®

## (undated) DEVICE — MICRO TIP WIPE: Brand: DEVON

## (undated) DEVICE — SOLUTION IV IRRIG POUR BRL 0.9% SODIUM CHL 2F7124

## (undated) DEVICE — DIFFUSER: Brand: CORE, MAESTRO

## (undated) DEVICE — TRAY CATHETER 16FR F INCLUDE BARDX IC COMPLT CARE DRNGE BG

## (undated) DEVICE — GLOVE ORANGE PI 8   MSG9080

## (undated) DEVICE — COVER,TABLE,44X90,STERILE: Brand: MEDLINE

## (undated) DEVICE — TUBING PRSS 36 M F

## (undated) DEVICE — SET CATH 20GA L1.5IN RAD ART POLYUR RADPQ W/ INTEGR 0.018IN

## (undated) DEVICE — GLOVE ORANGE PI 7   MSG9070

## (undated) DEVICE — SOLUTION IV 500ML 0.9% SOD CHL PH 5 INJ USP VIAFLX PLAS

## (undated) DEVICE — 1010 S-DRAPE TOWEL DRAPE 10/BX: Brand: STERI-DRAPE™

## (undated) DEVICE — INTENDED FOR TISSUE SEPARATION, AND OTHER PROCEDURES THAT REQUIRE A SHARP SURGICAL BLADE TO PUNCTURE OR CUT.: Brand: BARD-PARKER ® CARBON RIB-BACK BLADES

## (undated) DEVICE — GLOVE ORANGE PI 7 1/2   MSG9075

## (undated) DEVICE — 500ML,PRESSURE INFUSER W/STOPCOCK: Brand: MEDLINE

## (undated) DEVICE — CODMAN® SURGICAL PATTIES 1/4" X 1/4" (0.64CM X 0.64CM): Brand: CODMAN®

## (undated) DEVICE — CODMAN® SURGICAL PATTIES 1/2" X 1/2" (1.27CM X 1.27CM): Brand: CODMAN®

## (undated) DEVICE — BANDAGE,GAUZE,4.5"X4.1YD,STERILE,LF: Brand: MEDLINE

## (undated) DEVICE — PAD,NON-ADHERENT,3X8,STERILE,LF,1/PK: Brand: MEDLINE

## (undated) DEVICE — SPONGE GZ W4XL4IN COT 12 PLY TYP VII WVN C FLD DSGN

## (undated) DEVICE — PRESSURE MONITORING SET: Brand: TRUWAVE

## (undated) DEVICE — SPLINT ARMBRD W3XL10.5IN POLYFOAM DLX A LN

## (undated) DEVICE — 3M™ STERI-DRAPE™ INSTRUMENT POUCH 1018: Brand: STERI-DRAPE™

## (undated) DEVICE — GLOVE ORANGE PI 8 1/2   MSG9085

## (undated) DEVICE — SOLUTION IV 1000ML LAC RINGERS PH 6.5 INJ USP VIAFLX PLAS

## (undated) DEVICE — Z DISCONTINUED APPLICATOR SURG PREP 0.5OZ 2% CHG 70% ISO ALC WET FOR UNIV

## (undated) DEVICE — 3M™ MEDIPORE™ SOFT CLOTH TAPE, 4 INCH X 10 YARDS, 12 ROLLS/CASE, 2964: Brand: 3M™ MEDIPORE™

## (undated) DEVICE — SYRINGE BULB 50/CS 48/PLT: Brand: MEDEGEN MEDICAL PRODUCTS, LLC

## (undated) DEVICE — TUBING, SUCTION, 1/4" X 20', STRAIGHT: Brand: MEDLINE INDUSTRIES, INC.

## (undated) DEVICE — CARBIDE MATCH HEAD

## (undated) DEVICE — ZEISS MD MICROSCOPE DRAPE 54 X 120 - GLASS LENS: Brand: OPTICS ONE

## (undated) DEVICE — SUTURE PERMA-HAND SZ 3-0 L30IN NONABSORBABLE BLK L60MM KS 622H

## (undated) DEVICE — GLOVE SURG SZ 65 THK91MIL LTX FREE SYN POLYISOPRENE

## (undated) DEVICE — GAUZE,SPONGE,8"X4",12PLY,XRAY,STRL,LF: Brand: MEDLINE

## (undated) DEVICE — C-ARMOR C-ARM EQUIPMENT COVERS CLEAR STERILE UNIVERSAL FIT 12 PER CASE: Brand: C-ARMOR

## (undated) DEVICE — GOWN,SIRUS,NON REINFRCD,LARGE,SET IN SL: Brand: MEDLINE

## (undated) DEVICE — DISPOSABLE STANDARD BIPOLAR FORCEPS, NON-STICK,: Brand: SPETZLER-MALIS

## (undated) DEVICE — CANISTER, RIGID, 2000CC: Brand: MEDLINE INDUSTRIES, INC.

## (undated) DEVICE — HEAD POSITIONER, SURGICAL: Brand: DEROYAL

## (undated) DEVICE — NEEDLE SPNL L3.5IN PNK HUB S STL REG WALL FIT STYL W/ QNCKE

## (undated) DEVICE — SYRINGE MED 30ML STD CLR PLAS LUERLOCK TIP N CTRL DISP

## (undated) DEVICE — SET CATH 20GA L1.75IN RAD ART POLYUR RADPQ W/ INTEGR

## (undated) DEVICE — BLADE CLIPPER GEN PURP NS

## (undated) DEVICE — GAUZE,SPONGE,4"X4",12PLY,STERILE,LF,2'S: Brand: MEDLINE

## (undated) DEVICE — GOWN,SIRUS,NONRNF,SETINSLV,XL,20/CS: Brand: MEDLINE

## (undated) DEVICE — SUTURE VCRL SZ 0 L45CM ABSRB VLT OS-6 L36.4MM 1/2 CIR REV J711T

## (undated) DEVICE — CONTAINER,SPECIMEN,PNEU TUBE,4OZ,OR STRL: Brand: MEDLINE

## (undated) DEVICE — 3M™ TRANSPORE™ WHITE SURGICAL TAPE 1534-1, 1 INCH X 10 YARD (2,5CM X 9,1M), 12 ROLLS/CARTON 10 CARTONS/CASE: Brand: 3M™ TRANSPORE™

## (undated) DEVICE — ROYAL SILK SURGICAL GOWN, XXL: Brand: CONVERTORS

## (undated) DEVICE — BAND RUBBER ST